# Patient Record
Sex: FEMALE | Race: WHITE | NOT HISPANIC OR LATINO | URBAN - METROPOLITAN AREA
[De-identification: names, ages, dates, MRNs, and addresses within clinical notes are randomized per-mention and may not be internally consistent; named-entity substitution may affect disease eponyms.]

---

## 2017-10-31 ENCOUNTER — EMERGENCY (EMERGENCY)
Facility: HOSPITAL | Age: 72
LOS: 1 days | Discharge: HOME | End: 2017-10-31
Attending: EMERGENCY MEDICINE | Admitting: EMERGENCY MEDICINE
Payer: MEDICARE

## 2017-10-31 VITALS
TEMPERATURE: 98 F | SYSTOLIC BLOOD PRESSURE: 122 MMHG | DIASTOLIC BLOOD PRESSURE: 94 MMHG | OXYGEN SATURATION: 99 % | HEART RATE: 61 BPM | RESPIRATION RATE: 18 BRPM

## 2017-10-31 VITALS
OXYGEN SATURATION: 100 % | SYSTOLIC BLOOD PRESSURE: 130 MMHG | DIASTOLIC BLOOD PRESSURE: 46 MMHG | RESPIRATION RATE: 16 BRPM | TEMPERATURE: 98 F | HEART RATE: 57 BPM

## 2017-10-31 DIAGNOSIS — Z98.89 OTHER SPECIFIED POSTPROCEDURAL STATES: Chronic | ICD-10-CM

## 2017-10-31 LAB
ALBUMIN SERPL ELPH-MCNC: 3.9 G/DL — SIGNIFICANT CHANGE UP (ref 3.3–5)
ALP SERPL-CCNC: 97 U/L — SIGNIFICANT CHANGE UP (ref 40–120)
ALT FLD-CCNC: 84 U/L — HIGH (ref 4–33)
AST SERPL-CCNC: 54 U/L — HIGH (ref 4–32)
BASOPHILS # BLD AUTO: 0.03 K/UL — SIGNIFICANT CHANGE UP (ref 0–0.2)
BASOPHILS NFR BLD AUTO: 0.3 % — SIGNIFICANT CHANGE UP (ref 0–2)
BILIRUB SERPL-MCNC: 0.4 MG/DL — SIGNIFICANT CHANGE UP (ref 0.2–1.2)
BUN SERPL-MCNC: 24 MG/DL — HIGH (ref 7–23)
CALCIUM SERPL-MCNC: 8.4 MG/DL — SIGNIFICANT CHANGE UP (ref 8.4–10.5)
CHLORIDE SERPL-SCNC: 98 MMOL/L — SIGNIFICANT CHANGE UP (ref 98–107)
CO2 SERPL-SCNC: 19 MMOL/L — LOW (ref 22–31)
CREAT SERPL-MCNC: 0.99 MG/DL — SIGNIFICANT CHANGE UP (ref 0.5–1.3)
CRP SERPL-MCNC: 10.8 MG/L — HIGH
EOSINOPHIL # BLD AUTO: 0.08 K/UL — SIGNIFICANT CHANGE UP (ref 0–0.5)
EOSINOPHIL NFR BLD AUTO: 0.9 % — SIGNIFICANT CHANGE UP (ref 0–6)
GLUCOSE SERPL-MCNC: 77 MG/DL — SIGNIFICANT CHANGE UP (ref 70–99)
HCT VFR BLD CALC: 38.4 % — SIGNIFICANT CHANGE UP (ref 34.5–45)
HGB BLD-MCNC: 13 G/DL — SIGNIFICANT CHANGE UP (ref 11.5–15.5)
IMM GRANULOCYTES # BLD AUTO: 0.04 # — SIGNIFICANT CHANGE UP
IMM GRANULOCYTES NFR BLD AUTO: 0.5 % — SIGNIFICANT CHANGE UP (ref 0–1.5)
LYMPHOCYTES # BLD AUTO: 1.14 K/UL — SIGNIFICANT CHANGE UP (ref 1–3.3)
LYMPHOCYTES # BLD AUTO: 13 % — SIGNIFICANT CHANGE UP (ref 13–44)
MCHC RBC-ENTMCNC: 32.7 PG — SIGNIFICANT CHANGE UP (ref 27–34)
MCHC RBC-ENTMCNC: 33.9 % — SIGNIFICANT CHANGE UP (ref 32–36)
MCV RBC AUTO: 96.7 FL — SIGNIFICANT CHANGE UP (ref 80–100)
MONOCYTES # BLD AUTO: 0.43 K/UL — SIGNIFICANT CHANGE UP (ref 0–0.9)
MONOCYTES NFR BLD AUTO: 4.9 % — SIGNIFICANT CHANGE UP (ref 2–14)
NEUTROPHILS # BLD AUTO: 7.04 K/UL — SIGNIFICANT CHANGE UP (ref 1.8–7.4)
NEUTROPHILS NFR BLD AUTO: 80.4 % — HIGH (ref 43–77)
NRBC # FLD: 0 — SIGNIFICANT CHANGE UP
PLATELET # BLD AUTO: 268 K/UL — SIGNIFICANT CHANGE UP (ref 150–400)
PMV BLD: 9.6 FL — SIGNIFICANT CHANGE UP (ref 7–13)
POTASSIUM SERPL-MCNC: 4 MMOL/L — SIGNIFICANT CHANGE UP (ref 3.5–5.3)
POTASSIUM SERPL-SCNC: 4 MMOL/L — SIGNIFICANT CHANGE UP (ref 3.5–5.3)
PROT SERPL-MCNC: 6.9 G/DL — SIGNIFICANT CHANGE UP (ref 6–8.3)
RBC # BLD: 3.97 M/UL — SIGNIFICANT CHANGE UP (ref 3.8–5.2)
RBC # FLD: 14.6 % — HIGH (ref 10.3–14.5)
SODIUM SERPL-SCNC: 134 MMOL/L — LOW (ref 135–145)
WBC # BLD: 8.76 K/UL — SIGNIFICANT CHANGE UP (ref 3.8–10.5)
WBC # FLD AUTO: 8.76 K/UL — SIGNIFICANT CHANGE UP (ref 3.8–10.5)

## 2017-10-31 PROCEDURE — 99285 EMERGENCY DEPT VISIT HI MDM: CPT | Mod: GC

## 2017-10-31 PROCEDURE — 73502 X-RAY EXAM HIP UNI 2-3 VIEWS: CPT | Mod: 26,RT

## 2017-10-31 RX ORDER — MORPHINE SULFATE 50 MG/1
4 CAPSULE, EXTENDED RELEASE ORAL ONCE
Qty: 0 | Refills: 0 | Status: DISCONTINUED | OUTPATIENT
Start: 2017-10-31 | End: 2017-10-31

## 2017-10-31 RX ADMIN — MORPHINE SULFATE 4 MILLIGRAM(S): 50 CAPSULE, EXTENDED RELEASE ORAL at 12:52

## 2017-10-31 RX ADMIN — MORPHINE SULFATE 4 MILLIGRAM(S): 50 CAPSULE, EXTENDED RELEASE ORAL at 12:08

## 2017-10-31 RX ADMIN — MORPHINE SULFATE 4 MILLIGRAM(S): 50 CAPSULE, EXTENDED RELEASE ORAL at 10:27

## 2017-10-31 NOTE — PHYSICAL THERAPY INITIAL EVALUATION ADULT - PERTINENT HX OF CURRENT PROBLEM, REHAB EVAL
Pt is a 72 year old female with PMHx of CAD s/p stents, HTN, seizure, ETOH abuse, with recent Hip pinning 2/2 fall 1 week ago

## 2017-10-31 NOTE — ED PROVIDER NOTE - PMH
Alcohol abuse    Anxiety    Anxiety    CAD (coronary artery disease)    Coronary artery disease    Emphysema, unspecified    HTN (hypertension)    Hyperlipidemia    Hypertension    Migraine    Migraine    Pain of right hip joint    Seizure    Stented coronary artery

## 2017-10-31 NOTE — ED PROVIDER NOTE - PROGRESS NOTE DETAILS
pt symptomatically improved. pt showing drug seeking behavior. pt with recent post op from hip surgery/. PT eval pt and state clear for rehab. pt states she wants to go home, has PT eval at home. pt daughter states she will f/u with pmd, ortho. has oxy at home. instructed to rest, elevate legs. use stair master

## 2017-10-31 NOTE — PHYSICAL THERAPY INITIAL EVALUATION ADULT - ADDITIONAL COMMENTS
Pt reports that she lives in a private house with  ( has hx of CVA who she takes care of "during the evenings") son and his girlfriend with ~4STE and a flight of stairs to negotiate to bedroom. Pt was recently discharged from hospital s/p right hip pinning from fall ~1 week ago. Pt has been ambulating using rolling walker.    Pt left comfortable on stretcher, NAD, all lines intact, all precautions maintained, and RN aware of PT session. Pt reports that she lives in a private house with  ( has hx of CVA who she takes care of "during the evenings") son and his girlfriend with ~4STE and a flight of stairs to negotiate to bedroom. Pt was recently discharged from hospital s/p right hip pinning from fall ~1 week ago. Pt has been ambulating using rolling walker. Pt was suppose to start Home PT today.     Pt left comfortable on stretcher, NAD, all lines intact, all precautions maintained, and RN aware of PT session.

## 2017-10-31 NOTE — PHYSICAL THERAPY INITIAL EVALUATION ADULT - PATIENT PROFILE REVIEW, REHAB EVAL
No activity order in the computer; spoke with Reed Bobby (DO) prior to PT evaluation--> Pt OK for ambulation/yes

## 2017-10-31 NOTE — PHYSICAL THERAPY INITIAL EVALUATION ADULT - DIAGNOSIS, PT EVAL
Pt admitted for Right Hip pain s/p fall ~1 week ago s/p pinning; pt presents with decreased strength and antalgic gait

## 2017-10-31 NOTE — ED PROVIDER NOTE - OBJECTIVE STATEMENT
73 y/o f with pmhx of copd, htn, recent r hip fracture at [facility]. p/w worsening pain. states able to ambulate at home but states pain has significantly worsened. able to ambulate, no fevers. states nurse visited for wound dressing change, denies fever. denies new falls. has rom.

## 2017-10-31 NOTE — PROVIDER CONTACT NOTE (OTHER) - ASSESSMENT
Pt now refusing inpt rehab placement and would like to return home with out patient rehab or home care.  Daughter Brittni made aware of patient wanting to go home.  Family is to call the P/T agency and have them come back to the home for evaluation.
pt stable to go home.  pt to leave with senior ride ambulette.  Bill back to [facility] as per Noa Blackburn.  trip#[id number] for 4pm.
swk spoke with patient regarding rehab placement.  pt is in agreement for rehab placement.  Pt interested in [facility] for rehab.  APOLINAR and P/T eval received and ecind to [facility].
PT recommended STR placement, Pt is in agreement to go to rehab, APOLINAR completed and covering SW [facility] was informed.

## 2018-05-14 ENCOUNTER — INPATIENT (INPATIENT)
Facility: HOSPITAL | Age: 73
LOS: 10 days | Discharge: NURSING, REHAB OR LONG-TERM CARE | End: 2018-05-25
Attending: ORTHOPAEDIC SURGERY | Admitting: ORTHOPAEDIC SURGERY
Payer: MEDICARE

## 2018-05-14 VITALS
OXYGEN SATURATION: 99 % | DIASTOLIC BLOOD PRESSURE: 73 MMHG | RESPIRATION RATE: 18 BRPM | SYSTOLIC BLOOD PRESSURE: 172 MMHG | TEMPERATURE: 98 F | HEART RATE: 76 BPM

## 2018-05-14 DIAGNOSIS — Z98.89 OTHER SPECIFIED POSTPROCEDURAL STATES: Chronic | ICD-10-CM

## 2018-05-14 DIAGNOSIS — M87.051 IDIOPATHIC ASEPTIC NECROSIS OF RIGHT FEMUR: ICD-10-CM

## 2018-05-14 LAB
ALBUMIN SERPL ELPH-MCNC: 4 G/DL — SIGNIFICANT CHANGE UP (ref 3.3–5)
ALP SERPL-CCNC: 76 U/L — SIGNIFICANT CHANGE UP (ref 40–120)
ALT FLD-CCNC: 12 U/L — SIGNIFICANT CHANGE UP (ref 4–33)
AST SERPL-CCNC: 18 U/L — SIGNIFICANT CHANGE UP (ref 4–32)
BASOPHILS # BLD AUTO: 0.04 K/UL — SIGNIFICANT CHANGE UP (ref 0–0.2)
BASOPHILS NFR BLD AUTO: 0.5 % — SIGNIFICANT CHANGE UP (ref 0–2)
BILIRUB SERPL-MCNC: < 0.2 MG/DL — LOW (ref 0.2–1.2)
BUN SERPL-MCNC: 29 MG/DL — HIGH (ref 7–23)
CALCIUM SERPL-MCNC: 8.9 MG/DL — SIGNIFICANT CHANGE UP (ref 8.4–10.5)
CHLORIDE SERPL-SCNC: 100 MMOL/L — SIGNIFICANT CHANGE UP (ref 98–107)
CO2 SERPL-SCNC: 27 MMOL/L — SIGNIFICANT CHANGE UP (ref 22–31)
CREAT SERPL-MCNC: 1.45 MG/DL — HIGH (ref 0.5–1.3)
CRP SERPL-MCNC: 12.3 MG/L — HIGH
EOSINOPHIL # BLD AUTO: 0.16 K/UL — SIGNIFICANT CHANGE UP (ref 0–0.5)
EOSINOPHIL NFR BLD AUTO: 2.1 % — SIGNIFICANT CHANGE UP (ref 0–6)
ERYTHROCYTE [SEDIMENTATION RATE] IN BLOOD: 26 MM/HR — HIGH (ref 4–25)
GLUCOSE SERPL-MCNC: 88 MG/DL — SIGNIFICANT CHANGE UP (ref 70–99)
HCT VFR BLD CALC: 40.1 % — SIGNIFICANT CHANGE UP (ref 34.5–45)
HGB BLD-MCNC: 12.9 G/DL — SIGNIFICANT CHANGE UP (ref 11.5–15.5)
IMM GRANULOCYTES # BLD AUTO: 0.03 # — SIGNIFICANT CHANGE UP
IMM GRANULOCYTES NFR BLD AUTO: 0.4 % — SIGNIFICANT CHANGE UP (ref 0–1.5)
LYMPHOCYTES # BLD AUTO: 1.36 K/UL — SIGNIFICANT CHANGE UP (ref 1–3.3)
LYMPHOCYTES # BLD AUTO: 17.7 % — SIGNIFICANT CHANGE UP (ref 13–44)
MCHC RBC-ENTMCNC: 31.8 PG — SIGNIFICANT CHANGE UP (ref 27–34)
MCHC RBC-ENTMCNC: 32.2 % — SIGNIFICANT CHANGE UP (ref 32–36)
MCV RBC AUTO: 98.8 FL — SIGNIFICANT CHANGE UP (ref 80–100)
MONOCYTES # BLD AUTO: 0.73 K/UL — SIGNIFICANT CHANGE UP (ref 0–0.9)
MONOCYTES NFR BLD AUTO: 9.5 % — SIGNIFICANT CHANGE UP (ref 2–14)
NEUTROPHILS # BLD AUTO: 5.38 K/UL — SIGNIFICANT CHANGE UP (ref 1.8–7.4)
NEUTROPHILS NFR BLD AUTO: 69.8 % — SIGNIFICANT CHANGE UP (ref 43–77)
NRBC # FLD: 0 — SIGNIFICANT CHANGE UP
PLATELET # BLD AUTO: 322 K/UL — SIGNIFICANT CHANGE UP (ref 150–400)
PMV BLD: 8.6 FL — SIGNIFICANT CHANGE UP (ref 7–13)
POTASSIUM SERPL-MCNC: 3.8 MMOL/L — SIGNIFICANT CHANGE UP (ref 3.5–5.3)
POTASSIUM SERPL-SCNC: 3.8 MMOL/L — SIGNIFICANT CHANGE UP (ref 3.5–5.3)
PROT SERPL-MCNC: 7.3 G/DL — SIGNIFICANT CHANGE UP (ref 6–8.3)
RBC # BLD: 4.06 M/UL — SIGNIFICANT CHANGE UP (ref 3.8–5.2)
RBC # FLD: 15 % — HIGH (ref 10.3–14.5)
SODIUM SERPL-SCNC: 140 MMOL/L — SIGNIFICANT CHANGE UP (ref 135–145)
WBC # BLD: 7.7 K/UL — SIGNIFICANT CHANGE UP (ref 3.8–10.5)
WBC # FLD AUTO: 7.7 K/UL — SIGNIFICANT CHANGE UP (ref 3.8–10.5)

## 2018-05-14 PROCEDURE — 71045 X-RAY EXAM CHEST 1 VIEW: CPT | Mod: 26

## 2018-05-14 PROCEDURE — 73502 X-RAY EXAM HIP UNI 2-3 VIEWS: CPT | Mod: 26,RT

## 2018-05-14 PROCEDURE — 73700 CT LOWER EXTREMITY W/O DYE: CPT | Mod: 26,RT

## 2018-05-14 PROCEDURE — 76377 3D RENDER W/INTRP POSTPROCES: CPT | Mod: 26

## 2018-05-14 RX ORDER — TRAMADOL HYDROCHLORIDE 50 MG/1
25 TABLET ORAL EVERY 6 HOURS
Qty: 0 | Refills: 0 | Status: DISCONTINUED | OUTPATIENT
Start: 2018-05-14 | End: 2018-05-14

## 2018-05-14 RX ORDER — HYDROMORPHONE HYDROCHLORIDE 2 MG/ML
2 INJECTION INTRAMUSCULAR; INTRAVENOUS; SUBCUTANEOUS EVERY 4 HOURS
Qty: 0 | Refills: 0 | Status: DISCONTINUED | OUTPATIENT
Start: 2018-05-14 | End: 2018-05-15

## 2018-05-14 RX ORDER — OXYCODONE HYDROCHLORIDE 5 MG/1
5 TABLET ORAL EVERY 4 HOURS
Qty: 0 | Refills: 0 | Status: DISCONTINUED | OUTPATIENT
Start: 2018-05-14 | End: 2018-05-14

## 2018-05-14 RX ORDER — TRAZODONE HCL 50 MG
100 TABLET ORAL AT BEDTIME
Qty: 0 | Refills: 0 | Status: DISCONTINUED | OUTPATIENT
Start: 2018-05-14 | End: 2018-05-15

## 2018-05-14 RX ORDER — OXYCODONE HYDROCHLORIDE 5 MG/1
10 TABLET ORAL EVERY 4 HOURS
Qty: 0 | Refills: 0 | Status: DISCONTINUED | OUTPATIENT
Start: 2018-05-14 | End: 2018-05-14

## 2018-05-14 RX ORDER — HYDROMORPHONE HYDROCHLORIDE 2 MG/ML
4 INJECTION INTRAMUSCULAR; INTRAVENOUS; SUBCUTANEOUS EVERY 4 HOURS
Qty: 0 | Refills: 0 | Status: DISCONTINUED | OUTPATIENT
Start: 2018-05-14 | End: 2018-05-15

## 2018-05-14 RX ORDER — METOPROLOL TARTRATE 50 MG
25 TABLET ORAL
Qty: 0 | Refills: 0 | Status: DISCONTINUED | OUTPATIENT
Start: 2018-05-14 | End: 2018-05-15

## 2018-05-14 RX ORDER — MORPHINE SULFATE 50 MG/1
4 CAPSULE, EXTENDED RELEASE ORAL ONCE
Qty: 0 | Refills: 0 | Status: DISCONTINUED | OUTPATIENT
Start: 2018-05-14 | End: 2018-05-14

## 2018-05-14 RX ORDER — SIMVASTATIN 20 MG/1
20 TABLET, FILM COATED ORAL AT BEDTIME
Qty: 0 | Refills: 0 | Status: DISCONTINUED | OUTPATIENT
Start: 2018-05-14 | End: 2018-05-15

## 2018-05-14 RX ORDER — LISINOPRIL 2.5 MG/1
20 TABLET ORAL DAILY
Qty: 0 | Refills: 0 | Status: DISCONTINUED | OUTPATIENT
Start: 2018-05-14 | End: 2018-05-15

## 2018-05-14 RX ORDER — HEPARIN SODIUM 5000 [USP'U]/ML
5000 INJECTION INTRAVENOUS; SUBCUTANEOUS EVERY 8 HOURS
Qty: 0 | Refills: 0 | Status: DISCONTINUED | OUTPATIENT
Start: 2018-05-14 | End: 2018-05-15

## 2018-05-14 RX ORDER — MORPHINE SULFATE 50 MG/1
2 CAPSULE, EXTENDED RELEASE ORAL EVERY 4 HOURS
Qty: 0 | Refills: 0 | Status: DISCONTINUED | OUTPATIENT
Start: 2018-05-14 | End: 2018-05-16

## 2018-05-14 RX ORDER — DOCUSATE SODIUM 100 MG
100 CAPSULE ORAL THREE TIMES A DAY
Qty: 0 | Refills: 0 | Status: DISCONTINUED | OUTPATIENT
Start: 2018-05-14 | End: 2018-05-22

## 2018-05-14 RX ORDER — ACETAMINOPHEN 500 MG
650 TABLET ORAL EVERY 6 HOURS
Qty: 0 | Refills: 0 | Status: DISCONTINUED | OUTPATIENT
Start: 2018-05-14 | End: 2018-05-25

## 2018-05-14 RX ORDER — SENNA PLUS 8.6 MG/1
2 TABLET ORAL AT BEDTIME
Qty: 0 | Refills: 0 | Status: DISCONTINUED | OUTPATIENT
Start: 2018-05-14 | End: 2018-05-21

## 2018-05-14 RX ADMIN — HEPARIN SODIUM 5000 UNIT(S): 5000 INJECTION INTRAVENOUS; SUBCUTANEOUS at 21:56

## 2018-05-14 RX ADMIN — MORPHINE SULFATE 2 MILLIGRAM(S): 50 CAPSULE, EXTENDED RELEASE ORAL at 21:20

## 2018-05-14 RX ADMIN — MORPHINE SULFATE 4 MILLIGRAM(S): 50 CAPSULE, EXTENDED RELEASE ORAL at 12:06

## 2018-05-14 RX ADMIN — MORPHINE SULFATE 4 MILLIGRAM(S): 50 CAPSULE, EXTENDED RELEASE ORAL at 15:48

## 2018-05-14 RX ADMIN — MORPHINE SULFATE 4 MILLIGRAM(S): 50 CAPSULE, EXTENDED RELEASE ORAL at 15:49

## 2018-05-14 RX ADMIN — MORPHINE SULFATE 4 MILLIGRAM(S): 50 CAPSULE, EXTENDED RELEASE ORAL at 16:05

## 2018-05-14 RX ADMIN — MORPHINE SULFATE 2 MILLIGRAM(S): 50 CAPSULE, EXTENDED RELEASE ORAL at 21:01

## 2018-05-14 RX ADMIN — Medication 25 MILLIGRAM(S): at 21:01

## 2018-05-14 RX ADMIN — LISINOPRIL 20 MILLIGRAM(S): 2.5 TABLET ORAL at 21:01

## 2018-05-14 RX ADMIN — SIMVASTATIN 20 MILLIGRAM(S): 20 TABLET, FILM COATED ORAL at 21:01

## 2018-05-14 RX ADMIN — HYDROMORPHONE HYDROCHLORIDE 4 MILLIGRAM(S): 2 INJECTION INTRAMUSCULAR; INTRAVENOUS; SUBCUTANEOUS at 23:28

## 2018-05-14 NOTE — ED ADULT TRIAGE NOTE - CHIEF COMPLAINT QUOTE
Pt complaining of R hip pain, states she had a hip replacement "few months ago" was seen at [facility] last week for the same pain and was discharged. Pt states her daughter told her to come to [facility] for a second opinion.

## 2018-05-14 NOTE — ED ADULT NURSE NOTE - OBJECTIVE STATEMENT
Pt received to intake rm #4. A+Ox3.  skin warm and dry.  resp even, non-labored.  c/o pain to rt hip w hard mass since after surgery a few months ago.  pt is very talkative.  in nad.  seen and eval'd by md.  SL placed. labs sent.  pt medicated for pain as ordered. awaits imaging.  in nad.

## 2018-05-14 NOTE — ED PROVIDER NOTE - PROGRESS NOTE DETAILS
Rec'd signout on this pt from Dr. Leo: 73yo F s/p R hip fx w/ surgical repair 6 months ago, c/o severe R hip pain, has declined PT and acute rehab in the past due to social issues (takes care of ), CT hip showing Malunion of the right transcervical femoral neck fracture with   lucency surrounding the distal screw tip, and suggestive of avascular necrosis.  Dispo pending ortho eval. At very least, will likely need admission for pain mngmt.   -Dr. Vallejo Ahmet: At pt's request I called her daughter, Brittni, who is a PA at [employer], with update.  Reviewed CT findings and possible need for total hip replacement. Also that we are waiting for final recc from ortho.  She expressed understanding and requested updated with final plan.  Dr. Monae to call pt's daughter this evening. Brittni Rula [phone]

## 2018-05-14 NOTE — ED PROVIDER NOTE - OBJECTIVE STATEMENT
72 yr old F c h/o right hip fx and surgery < 6 mo ago, extensive PMHx, who p/w severe right hip pain and trouble ambulating.  States this has been an ongoing issue.  Was seen at this ER previously for same. Also, recently, seen at [facility] for this, and discharged . States there is a bulge from her right hip which is new.  eAlso appears very uncomfortable. Taking medications for pain without relief. States last time she was in this ED PT recc inpt rehab but she declined since she had to care for her  at home (confirmed c PT note).  States now she is amenable since she has an aid for her . No new falls or injuries. 72 yr old F c h/o right hip fx and surgery < 6 mo ago, extensive PMHx, who p/w severe right hip pain and trouble ambulating.  States this has been an ongoing issue.  Was seen at this ER previously for same. Also, recently, seen at [facility] for this, and discharged . States there is a bulge from her right hip which is new.  eAlso appears very uncomfortable. Taking medications for pain without relief. States last time she was in this ED PT recc inpt rehab but she declined since she had to care for her  at home (confirmed c PT note).  States now she is amenable since she has an aid for her . No new falls or injuries.    Daughter: Brittni Horta [phone]

## 2018-05-14 NOTE — H&P ADULT - HISTORY OF PRESENT ILLNESS
HPI: 72y Female w/ hx of R femoral neck fx s/p CRPP at [facility] 10/17, CAD s/p multiple stents (remote), HTN, HLD, EtOH abuse, and anxiety, who is a community ambulator w/o assistive devices presents c/o months of progressively worsening R hip pain that has now become so bad that she is unable to ambulate. Says that the pain is mostly in her right groin and occasionally radiates down her anterior leg. Denies numbness/tingling. Denies fever/chills. Denies pain/injury elsewhere. Patient says that she is an active smoker. She also says that following her CRPP procedure, she had some sort of infection where her legs get red and swollen and she required antibiotics, possible cellulitis.     Of note, patient says that her cardiologist is Dr. Tera Romero, a doctor affiliated with [place].    PAST MEDICAL & SURGICAL HISTORY:  Pain of right hip joint  Migraine  Alcohol abuse  Seizure  Stented coronary artery  Coronary artery disease  Anxiety  HTN (hypertension)  Emphysema, unspecified  Anxiety  Migraine  CAD (coronary artery disease)  Hyperlipidemia  Hypertension  No significant past surgical history  S/P bladder repair    Social hx:  Active smoker  Former drinker: hx of EtOH abuse    MEDICATIONS  (STANDING):  docusate sodium 100 milliGRAM(s) Oral three times a day  heparin  Injectable 5000 Unit(s) SubCutaneous every 8 hours  lisinopril 20 milliGRAM(s) Oral daily  metoprolol tartrate 25 milliGRAM(s) Oral two times a day  senna 2 Tablet(s) Oral at bedtime  simvastatin 20 milliGRAM(s) Oral at bedtime    Allergies  No Known Allergies                        12.9   7.70  )-----------( 322      ( 14 May 2018 12:09 )             40.1     14 May 2018 12:09    140    |  100    |  29     ----------------------------<  88     3.8     |  27     |  1.45     Ca    8.9        14 May 2018 12:09    TPro  7.3    /  Alb  4.0    /  TBili  < 0.2  /  DBili  x      /  AST  18     /  ALT  12     /  AlkPhos  76     14 May 2018 12:09    Vital Signs Last 24 Hrs  T(C): 37.2 (05-14-18 @ 18:30), Max: 37.3 (05-14-18 @ 15:55)  T(F): 99 (05-14-18 @ 18:30), Max: 99.2 (05-14-18 @ 15:55)  HR: 65 (05-14-18 @ 18:30) (65 - 76)  BP: 108/54 (05-14-18 @ 18:30) (108/54 - 172/73)  RR: 18 (05-14-18 @ 18:30) (18 - 18)  SpO2: 98% (05-14-18 @ 18:30) (98% - 99%)    Imaging: XR were personally reviewed and demonstrates collapse of the femoral head/neck s/p CRPP, with backing out of the screws    CT hip:  IMPRESSION:  1.  Status post ORIF of the right hip with transcervical screws.  2.  Malunion of the right transcervical femoral neck fracture with   lucency surrounding the distal screw tips. Screws closely approximate and   contact the articular surface of the femoral head.  3.  Geographic area of hypointense signal within the femoral head   suggesting avascular necrosis  4.  Fluid and calcifications overlying the right lateral screws adjacent   to the greater trochanter suggestive of a bursitis.    Physical Exam  Gen: NAD  Gait: antalgic gait, patient only able to take a few unstable steps before stoping due to pain  RLE: skin intact, but tented, presumably by CRPP screws, no areas of skin breakdown  5/5 Quads/TA/EHL/GS  SILT in all distributions  WWP distally    A/P: 72y Female 7 months s/p R femoral neck fracture CRPP with avascular necrosis of the femoral head and collapse causing severe arthrosis. Patient being admitted to orthopaedic service for further workup and consideration of total hip arthroplasty.    Pain control  Needs workup to r/o infectious causes of hip pain: FU ESR, CRP  FU pre-op labs and studies  Medical clearance/optimization for OR  Cardiology consult  DVT ppx  Admit to Ortho    Patient discussed with Dr. Addison Lakhani MD

## 2018-05-14 NOTE — ED ADULT NURSE NOTE - CHIEF COMPLAINT QUOTE
Pt complaining of R hip pain, states she had a hip replacement "few months ago" was seen at [facility] last week for the same pain and was discharged. Pt states her daughter told her to come to [facility] for a second opinion.

## 2018-05-14 NOTE — ED PROVIDER NOTE - MEDICAL DECISION MAKING DETAILS
Right hip pain, had declined PT in past, appears uncomfortable.  no new injuries but will re-image, pain control, possible admit for PT.

## 2018-05-15 DIAGNOSIS — I10 ESSENTIAL (PRIMARY) HYPERTENSION: ICD-10-CM

## 2018-05-15 DIAGNOSIS — E78.5 HYPERLIPIDEMIA, UNSPECIFIED: ICD-10-CM

## 2018-05-15 DIAGNOSIS — F10.10 ALCOHOL ABUSE, UNCOMPLICATED: ICD-10-CM

## 2018-05-15 DIAGNOSIS — F41.9 ANXIETY DISORDER, UNSPECIFIED: ICD-10-CM

## 2018-05-15 DIAGNOSIS — F17.200 NICOTINE DEPENDENCE, UNSPECIFIED, UNCOMPLICATED: ICD-10-CM

## 2018-05-15 DIAGNOSIS — M87.051 IDIOPATHIC ASEPTIC NECROSIS OF RIGHT FEMUR: ICD-10-CM

## 2018-05-15 DIAGNOSIS — Z29.9 ENCOUNTER FOR PROPHYLACTIC MEASURES, UNSPECIFIED: ICD-10-CM

## 2018-05-15 DIAGNOSIS — J44.9 CHRONIC OBSTRUCTIVE PULMONARY DISEASE, UNSPECIFIED: ICD-10-CM

## 2018-05-15 DIAGNOSIS — I25.10 ATHEROSCLEROTIC HEART DISEASE OF NATIVE CORONARY ARTERY WITHOUT ANGINA PECTORIS: ICD-10-CM

## 2018-05-15 LAB
APTT BLD: 30.3 SEC — SIGNIFICANT CHANGE UP (ref 27.5–37.4)
BLD GP AB SCN SERPL QL: NEGATIVE — SIGNIFICANT CHANGE UP
BUN SERPL-MCNC: 23 MG/DL — SIGNIFICANT CHANGE UP (ref 7–23)
CALCIUM SERPL-MCNC: 8.8 MG/DL — SIGNIFICANT CHANGE UP (ref 8.4–10.5)
CHLORIDE SERPL-SCNC: 101 MMOL/L — SIGNIFICANT CHANGE UP (ref 98–107)
CO2 SERPL-SCNC: 28 MMOL/L — SIGNIFICANT CHANGE UP (ref 22–31)
CREAT SERPL-MCNC: 0.76 MG/DL — SIGNIFICANT CHANGE UP (ref 0.5–1.3)
GLUCOSE SERPL-MCNC: 91 MG/DL — SIGNIFICANT CHANGE UP (ref 70–99)
HCT VFR BLD CALC: 38 % — SIGNIFICANT CHANGE UP (ref 34.5–45)
HGB BLD-MCNC: 12.3 G/DL — SIGNIFICANT CHANGE UP (ref 11.5–15.5)
INR BLD: 0.99 — SIGNIFICANT CHANGE UP (ref 0.88–1.17)
MCHC RBC-ENTMCNC: 32.4 % — SIGNIFICANT CHANGE UP (ref 32–36)
MCHC RBC-ENTMCNC: 32.6 PG — SIGNIFICANT CHANGE UP (ref 27–34)
MCV RBC AUTO: 100.8 FL — HIGH (ref 80–100)
NRBC # FLD: 0 — SIGNIFICANT CHANGE UP
PLATELET # BLD AUTO: 259 K/UL — SIGNIFICANT CHANGE UP (ref 150–400)
PMV BLD: 8.8 FL — SIGNIFICANT CHANGE UP (ref 7–13)
POTASSIUM SERPL-MCNC: 4.1 MMOL/L — SIGNIFICANT CHANGE UP (ref 3.5–5.3)
POTASSIUM SERPL-SCNC: 4.1 MMOL/L — SIGNIFICANT CHANGE UP (ref 3.5–5.3)
PROTHROM AB SERPL-ACNC: 11 SEC — SIGNIFICANT CHANGE UP (ref 9.8–13.1)
RBC # BLD: 3.77 M/UL — LOW (ref 3.8–5.2)
RBC # FLD: 15 % — HIGH (ref 10.3–14.5)
RH IG SCN BLD-IMP: POSITIVE — SIGNIFICANT CHANGE UP
SODIUM SERPL-SCNC: 140 MMOL/L — SIGNIFICANT CHANGE UP (ref 135–145)
WBC # BLD: 5.91 K/UL — SIGNIFICANT CHANGE UP (ref 3.8–10.5)
WBC # FLD AUTO: 5.91 K/UL — SIGNIFICANT CHANGE UP (ref 3.8–10.5)

## 2018-05-15 PROCEDURE — 99223 1ST HOSP IP/OBS HIGH 75: CPT

## 2018-05-15 RX ORDER — NICOTINE POLACRILEX 2 MG
1 GUM BUCCAL DAILY
Qty: 0 | Refills: 0 | Status: DISCONTINUED | OUTPATIENT
Start: 2018-05-15 | End: 2018-05-25

## 2018-05-15 RX ORDER — TIOTROPIUM BROMIDE 18 UG/1
1 CAPSULE ORAL; RESPIRATORY (INHALATION) DAILY
Qty: 0 | Refills: 0 | Status: DISCONTINUED | OUTPATIENT
Start: 2018-05-15 | End: 2018-05-25

## 2018-05-15 RX ORDER — HYDROMORPHONE HYDROCHLORIDE 2 MG/ML
2 INJECTION INTRAMUSCULAR; INTRAVENOUS; SUBCUTANEOUS
Qty: 0 | Refills: 0 | Status: DISCONTINUED | OUTPATIENT
Start: 2018-05-15 | End: 2018-05-16

## 2018-05-15 RX ORDER — THIAMINE MONONITRATE (VIT B1) 100 MG
100 TABLET ORAL DAILY
Qty: 0 | Refills: 0 | Status: DISCONTINUED | OUTPATIENT
Start: 2018-05-15 | End: 2018-05-16

## 2018-05-15 RX ORDER — HEPARIN SODIUM 5000 [USP'U]/ML
5000 INJECTION INTRAVENOUS; SUBCUTANEOUS EVERY 8 HOURS
Qty: 0 | Refills: 0 | Status: COMPLETED | OUTPATIENT
Start: 2018-05-15 | End: 2018-05-17

## 2018-05-15 RX ORDER — HYDROMORPHONE HYDROCHLORIDE 2 MG/ML
4 INJECTION INTRAMUSCULAR; INTRAVENOUS; SUBCUTANEOUS
Qty: 0 | Refills: 0 | Status: DISCONTINUED | OUTPATIENT
Start: 2018-05-15 | End: 2018-05-16

## 2018-05-15 RX ORDER — MORPHINE SULFATE 50 MG/1
2 CAPSULE, EXTENDED RELEASE ORAL ONCE
Qty: 0 | Refills: 0 | Status: DISCONTINUED | OUTPATIENT
Start: 2018-05-15 | End: 2018-05-15

## 2018-05-15 RX ORDER — NICOTINE POLACRILEX 2 MG
1 GUM BUCCAL DAILY
Qty: 0 | Refills: 0 | Status: DISCONTINUED | OUTPATIENT
Start: 2018-05-15 | End: 2018-05-15

## 2018-05-15 RX ORDER — FOLIC ACID 0.8 MG
1 TABLET ORAL DAILY
Qty: 0 | Refills: 0 | Status: DISCONTINUED | OUTPATIENT
Start: 2018-05-15 | End: 2018-05-16

## 2018-05-15 RX ORDER — NIFEDIPINE 30 MG
90 TABLET, EXTENDED RELEASE 24 HR ORAL DAILY
Qty: 0 | Refills: 0 | Status: DISCONTINUED | OUTPATIENT
Start: 2018-05-15 | End: 2018-05-25

## 2018-05-15 RX ORDER — HEPARIN SODIUM 5000 [USP'U]/ML
5000 INJECTION INTRAVENOUS; SUBCUTANEOUS EVERY 8 HOURS
Qty: 0 | Refills: 0 | Status: DISCONTINUED | OUTPATIENT
Start: 2018-05-15 | End: 2018-05-15

## 2018-05-15 RX ORDER — ASPIRIN/CALCIUM CARB/MAGNESIUM 324 MG
81 TABLET ORAL DAILY
Qty: 0 | Refills: 0 | Status: COMPLETED | OUTPATIENT
Start: 2018-05-15 | End: 2018-05-20

## 2018-05-15 RX ORDER — ALBUTEROL 90 UG/1
2 AEROSOL, METERED ORAL EVERY 6 HOURS
Qty: 0 | Refills: 0 | Status: DISCONTINUED | OUTPATIENT
Start: 2018-05-15 | End: 2018-05-25

## 2018-05-15 RX ORDER — HYDRALAZINE HCL 50 MG
25 TABLET ORAL EVERY 8 HOURS
Qty: 0 | Refills: 0 | Status: DISCONTINUED | OUTPATIENT
Start: 2018-05-15 | End: 2018-05-17

## 2018-05-15 RX ORDER — THIAMINE MONONITRATE (VIT B1) 100 MG
100 TABLET ORAL DAILY
Qty: 0 | Refills: 0 | Status: DISCONTINUED | OUTPATIENT
Start: 2018-05-15 | End: 2018-05-15

## 2018-05-15 RX ORDER — CARVEDILOL PHOSPHATE 80 MG/1
12.5 CAPSULE, EXTENDED RELEASE ORAL EVERY 12 HOURS
Qty: 0 | Refills: 0 | Status: DISCONTINUED | OUTPATIENT
Start: 2018-05-15 | End: 2018-05-19

## 2018-05-15 RX ORDER — SODIUM CHLORIDE 9 MG/ML
1000 INJECTION, SOLUTION INTRAVENOUS
Qty: 0 | Refills: 0 | Status: DISCONTINUED | OUTPATIENT
Start: 2018-05-15 | End: 2018-05-15

## 2018-05-15 RX ORDER — FOLIC ACID 0.8 MG
1 TABLET ORAL DAILY
Qty: 0 | Refills: 0 | Status: DISCONTINUED | OUTPATIENT
Start: 2018-05-15 | End: 2018-05-15

## 2018-05-15 RX ADMIN — HYDROMORPHONE HYDROCHLORIDE 4 MILLIGRAM(S): 2 INJECTION INTRAMUSCULAR; INTRAVENOUS; SUBCUTANEOUS at 19:40

## 2018-05-15 RX ADMIN — MORPHINE SULFATE 2 MILLIGRAM(S): 50 CAPSULE, EXTENDED RELEASE ORAL at 22:23

## 2018-05-15 RX ADMIN — Medication 0.5 MILLIGRAM(S): at 17:54

## 2018-05-15 RX ADMIN — HYDROMORPHONE HYDROCHLORIDE 4 MILLIGRAM(S): 2 INJECTION INTRAMUSCULAR; INTRAVENOUS; SUBCUTANEOUS at 05:03

## 2018-05-15 RX ADMIN — Medication 81 MILLIGRAM(S): at 18:15

## 2018-05-15 RX ADMIN — MORPHINE SULFATE 2 MILLIGRAM(S): 50 CAPSULE, EXTENDED RELEASE ORAL at 14:15

## 2018-05-15 RX ADMIN — HYDROMORPHONE HYDROCHLORIDE 4 MILLIGRAM(S): 2 INJECTION INTRAMUSCULAR; INTRAVENOUS; SUBCUTANEOUS at 05:30

## 2018-05-15 RX ADMIN — Medication 25 MILLIGRAM(S): at 23:09

## 2018-05-15 RX ADMIN — HYDROMORPHONE HYDROCHLORIDE 4 MILLIGRAM(S): 2 INJECTION INTRAMUSCULAR; INTRAVENOUS; SUBCUTANEOUS at 12:14

## 2018-05-15 RX ADMIN — Medication 100 MILLIGRAM(S): at 18:15

## 2018-05-15 RX ADMIN — MORPHINE SULFATE 2 MILLIGRAM(S): 50 CAPSULE, EXTENDED RELEASE ORAL at 22:40

## 2018-05-15 RX ADMIN — Medication 100 MILLIGRAM(S): at 01:09

## 2018-05-15 RX ADMIN — Medication 1 PATCH: at 13:19

## 2018-05-15 RX ADMIN — Medication 1 TABLET(S): at 18:58

## 2018-05-15 RX ADMIN — MORPHINE SULFATE 2 MILLIGRAM(S): 50 CAPSULE, EXTENDED RELEASE ORAL at 05:59

## 2018-05-15 RX ADMIN — MORPHINE SULFATE 2 MILLIGRAM(S): 50 CAPSULE, EXTENDED RELEASE ORAL at 14:02

## 2018-05-15 RX ADMIN — HYDROMORPHONE HYDROCHLORIDE 4 MILLIGRAM(S): 2 INJECTION INTRAMUSCULAR; INTRAVENOUS; SUBCUTANEOUS at 09:50

## 2018-05-15 RX ADMIN — MORPHINE SULFATE 2 MILLIGRAM(S): 50 CAPSULE, EXTENDED RELEASE ORAL at 01:03

## 2018-05-15 RX ADMIN — HYDROMORPHONE HYDROCHLORIDE 4 MILLIGRAM(S): 2 INJECTION INTRAMUSCULAR; INTRAVENOUS; SUBCUTANEOUS at 09:06

## 2018-05-15 RX ADMIN — MORPHINE SULFATE 2 MILLIGRAM(S): 50 CAPSULE, EXTENDED RELEASE ORAL at 10:15

## 2018-05-15 RX ADMIN — HYDROMORPHONE HYDROCHLORIDE 4 MILLIGRAM(S): 2 INJECTION INTRAMUSCULAR; INTRAVENOUS; SUBCUTANEOUS at 13:00

## 2018-05-15 RX ADMIN — CARVEDILOL PHOSPHATE 12.5 MILLIGRAM(S): 80 CAPSULE, EXTENDED RELEASE ORAL at 18:15

## 2018-05-15 RX ADMIN — MORPHINE SULFATE 2 MILLIGRAM(S): 50 CAPSULE, EXTENDED RELEASE ORAL at 18:05

## 2018-05-15 RX ADMIN — MORPHINE SULFATE 2 MILLIGRAM(S): 50 CAPSULE, EXTENDED RELEASE ORAL at 06:15

## 2018-05-15 RX ADMIN — MORPHINE SULFATE 2 MILLIGRAM(S): 50 CAPSULE, EXTENDED RELEASE ORAL at 20:06

## 2018-05-15 RX ADMIN — MORPHINE SULFATE 2 MILLIGRAM(S): 50 CAPSULE, EXTENDED RELEASE ORAL at 01:20

## 2018-05-15 RX ADMIN — HYDROMORPHONE HYDROCHLORIDE 4 MILLIGRAM(S): 2 INJECTION INTRAMUSCULAR; INTRAVENOUS; SUBCUTANEOUS at 23:10

## 2018-05-15 RX ADMIN — MORPHINE SULFATE 2 MILLIGRAM(S): 50 CAPSULE, EXTENDED RELEASE ORAL at 10:03

## 2018-05-15 RX ADMIN — HYDROMORPHONE HYDROCHLORIDE 4 MILLIGRAM(S): 2 INJECTION INTRAMUSCULAR; INTRAVENOUS; SUBCUTANEOUS at 18:58

## 2018-05-15 RX ADMIN — HYDROMORPHONE HYDROCHLORIDE 4 MILLIGRAM(S): 2 INJECTION INTRAMUSCULAR; INTRAVENOUS; SUBCUTANEOUS at 16:00

## 2018-05-15 RX ADMIN — MORPHINE SULFATE 2 MILLIGRAM(S): 50 CAPSULE, EXTENDED RELEASE ORAL at 18:20

## 2018-05-15 RX ADMIN — MORPHINE SULFATE 2 MILLIGRAM(S): 50 CAPSULE, EXTENDED RELEASE ORAL at 20:50

## 2018-05-15 RX ADMIN — Medication 1 TABLET(S): at 18:15

## 2018-05-15 RX ADMIN — HYDROMORPHONE HYDROCHLORIDE 4 MILLIGRAM(S): 2 INJECTION INTRAMUSCULAR; INTRAVENOUS; SUBCUTANEOUS at 15:43

## 2018-05-15 RX ADMIN — LISINOPRIL 20 MILLIGRAM(S): 2.5 TABLET ORAL at 05:03

## 2018-05-15 RX ADMIN — Medication 1 MILLIGRAM(S): at 18:15

## 2018-05-15 NOTE — CONSULT NOTE ADULT - SUBJECTIVE AND OBJECTIVE BOX
CHIEF COMPLAINT: Patient is a 72y old  Female who presents with a chief complaint of R hip pain impairing her ability to ambulate (14 May 2018 18:57)    HPI: 72F h/o R femoral neck fx s/p CRPP at [facility] 10/17, CAD s/p multiple stents (remote), HTN, HLD, EtOH abuse, and anxiety, who is a community ambulator w/o assistive devices presents c/o months of progressively worsening R hip pain that has now become so bad that she is unable to ambulate. Says that the pain is mostly in her right groin and occasionally radiates down her anterior leg. Denies numbness/tingling. Denies fever/chills. Denies pain/injury elsewhere. Patient says that she is an active smoker. She also says that following her CRPP procedure, she had some sort of infection where her legs get red and swollen and she required antibiotics, possible cellulitis.     Of note, patient says that her cardiologist is Dr. Tera Romero, a doctor affiliated with [place]. She reports having had an echo couple years ago but not recently. She's c/o SOB "every now and then on exertion". She can only walk 1 block before feeling SOB. She denies any chest pain. She cares for herself and performs all her ADLs.    Pain Symptoms if applicable:             	                         none	   mild    moderate    severe  	                            0	    1-3	     4-6	         7-10  Pain: 8  Location: right hip/groin	  Modifying factors: pain with walking	  Associated symptoms: right leg pain	    Allergies: No Known Allergies    HOME MEDICATIONS: [x] Reviewed  lisinopril 20 milliGRAM(s) Oral daily  metoprolol tartrate 25 milliGRAM(s) Oral two times a day  norvasc  simvastatin 20 milliGRAM(s) Oral at bedtime  acetaminophen-cod #4 tablet  [incomplete}    MEDICATIONS  (STANDING):  aspirin enteric coated 81 milliGRAM(s) Oral daily  docusate sodium 100 milliGRAM(s) Oral three times a day  heparin  Injectable 5000 Unit(s) SubCutaneous every 8 hours  lisinopril 20 milliGRAM(s) Oral daily  metoprolol tartrate 25 milliGRAM(s) Oral two times a day  nicotine - 21 mG/24Hr(s) Patch 1 patch Transdermal daily  senna 2 Tablet(s) Oral at bedtime  simvastatin 20 milliGRAM(s) Oral at bedtime    iSTOP Reference #: [id number]  Written	Rx       Dispensed	            Drug	                     Quantity	Days Supply	Prescriber Name  04/30/2018	05/07/2018	acetaminophen-cod #4 tablet	60	10	            Navdeep Montez MD  03/06/2018	03/06/2018	acetaminophen-cod #4 tablet	60	10	            Navdeep Montez MD  01/31/2018	02/02/2018	acetaminophen-cod #4 tablet	60	15	            Navdeep Montez MD  01/16/2018	01/17/2018	acetaminophen-cod #3 tablet	20	5	            [facility]  01/08/2018	01/08/2018	acetaminophen-cod #4 tablet	60	15	            Navdeep Montez MD  12/28/2017	12/28/2017	virtussin ac liquid	            236	8	            Brittni Srivastava RPA-C  11/29/2017	11/29/2017	percocet 5-325 mg tab	            28	7	            Navdeep Montez MD  11/06/2017	11/07/2017	percocet 5-325 mg tab	            12	3	            Pelon Vargas  10/24/2017	10/24/2017	oxycodone hcl 5 mg tablet	30	5	            [facility]    MEDICATIONS  (PRN):  acetaminophen   Tablet. 650 milliGRAM(s) Oral every 6 hours PRN Mild Pain (1 - 3)  HYDROmorphone   Tablet 2 milliGRAM(s) Oral every 3 hours PRN Moderate Pain (4 - 6)  HYDROmorphone   Tablet 4 milliGRAM(s) Oral every 3 hours PRN Severe Pain (7 - 10)  morphine  - Injectable 2 milliGRAM(s) IV Push every 4 hours PRN severe breakthrough pain unrelieved by oxycodone  traZODone 100 milliGRAM(s) Oral at bedtime PRN insomnia    PAST MEDICAL & SURGICAL HISTORY:  Pain of right hip joint  Migraine  Alcohol abuse  Seizure  Stented coronary artery  Coronary artery disease  Anxiety  HTN (hypertension)  Emphysema, unspecified  Anxiety  Migraine  CAD (coronary artery disease)  Hyperlipidemia  Hypertension  No significant past surgical history  S/P bladder repair  [x ] Reviewed     SOCIAL HISTORY:  [x] Substance abuse: reports none  [x] Alcohol use: reports none  [x] Tobacco: smoker 1ppd    FAMILY HISTORY:  Family history of coronary artery disease in daughter (Child)    REVIEW OF SYSTEMS:  [x] All other ROS negative      Vital Signs Last 24 Hrs  T(C): 36.4 (15 May 2018 09:43), Max: 37.2 (14 May 2018 18:30)  T(F): 97.6 (15 May 2018 09:43), Max: 99 (14 May 2018 18:30)  HR: 63 (15 May 2018 09:43) (61 - 83)  BP: 149/59 (15 May 2018 09:43) (108/54 - 165/83)  BP(mean): --  RR: 18 (15 May 2018 09:43) (18 - 18)  SpO2: 100% (15 May 2018 09:43) (98% - 100%)    PHYSICAL EXAM:  GENERAL: NAD, well-groomed, well-developed  HEAD:  Atraumatic, Normocephalic  EYES: EOMI, PERRLA, conjunctiva and sclera clear  ENMT: Moist mucous membranes  NECK: Supple, No JVD  RESPIRATORY: few rhonchi  CARDIOVASCULAR: Regular rate and rhythm; 2/6 YOLY   GASTROINTESTINAL: Soft, Nontender, Nondistended; Bowel sounds present  EXTREMITIES:  2+ Peripheral Pulses, No clubbing, cyanosis, or edema  NERVOUS SYSTEM:  Alert & Oriented X3; Moving all 4 extremities; No gross sensory deficits      LABS:                        12.3   5.91  )-----------( 259      ( 15 May 2018 05:53 )             38.0     Hemoglobin: 12.3 g/dL (05-15 @ 05:53)  Hemoglobin: 12.9 g/dL (05-14 @ 12:09)    05-15    140  |  101  |  23  ----------------------------<  91  4.1   |  28  |  0.76    Ca    8.8      15 May 2018 05:53    TPro  7.3  /  Alb  4.0  /  TBili  < 0.2<L>  /  DBili  x   /  AST  18  /  ALT  12  /  AlkPhos  76  05-14    PT/INR - ( 15 May 2018 05:53 )   PT: 11.0 SEC;   INR: 0.99     PTT - ( 15 May 2018 05:53 )  PTT:30.3 SEC    RADIOLOGY & ADDITIONAL STUDIES:    Imaging:   Personally Reviewed:  [x] YES               [ ] Consultant(s) Notes Reviewed  [x] Care Discussed with Consultants/Other Providers: Ortho PA - discussed pre-op clearance

## 2018-05-15 NOTE — CONSULT NOTE ADULT - SUBJECTIVE AND OBJECTIVE BOX
CHIEF COMPLAINT:Patient is a 72y old  Female who presents with a chief complaint of R hip pain impairing her ability to ambulate (14 May 2018 18:57)      HISTORY OF PRESENT ILLNESS:  This is a 72 f with history as below namely cad sp multiple stents in the past   now with AVN of hip plan for USAMA  has GRAY  fatigue  no chest pain   no dizziness     PAST MEDICAL & SURGICAL HISTORY:  Pain of right hip joint  Migraine  Alcohol abuse  Seizure  Stented coronary artery  Coronary artery disease  Anxiety  HTN (hypertension)  Emphysema, unspecified  Anxiety  Migraine  CAD (coronary artery disease)  Hyperlipidemia  Hypertension  No significant past surgical history  S/P bladder repair          MEDICATIONS:  heparin  Injectable 5000 Unit(s) SubCutaneous every 8 hours  lisinopril 20 milliGRAM(s) Oral daily  metoprolol tartrate 25 milliGRAM(s) Oral two times a day        acetaminophen   Tablet. 650 milliGRAM(s) Oral every 6 hours PRN  HYDROmorphone   Tablet 2 milliGRAM(s) Oral every 3 hours PRN  HYDROmorphone   Tablet 4 milliGRAM(s) Oral every 3 hours PRN  morphine  - Injectable 2 milliGRAM(s) IV Push every 4 hours PRN  traZODone 100 milliGRAM(s) Oral at bedtime PRN    docusate sodium 100 milliGRAM(s) Oral three times a day  senna 2 Tablet(s) Oral at bedtime    simvastatin 20 milliGRAM(s) Oral at bedtime        FAMILY HISTORY:  Family history of coronary artery disease in daughter (Child)      Non-contributory    SOCIAL HISTORY:    pos tobacco 1 ppd    Allergies    No Known Allergies    Intolerances    	    REVIEW OF SYSTEMS:  as above  The rest of the 14 points ROS reviewed and except above they are unremarkable.        PHYSICAL EXAM:  T(C): 36.4 (05-15-18 @ 09:43), Max: 37.2 (05-14-18 @ 18:30)  HR: 63 (05-15-18 @ 09:43) (61 - 83)  BP: 149/59 (05-15-18 @ 09:43) (108/54 - 165/83)  RR: 18 (05-15-18 @ 09:43) (18 - 18)  SpO2: 100% (05-15-18 @ 09:43) (98% - 100%)  Wt(kg): --  I&O's Summary    14 May 2018 07:01  -  15 May 2018 07:00  --------------------------------------------------------  IN: 0 mL / OUT: 425 mL / NET: -425 mL      JVP: Normal  Neck: supple  Lung: few rhnchi   CV: S1 S2 , Murmur: 2.6 gardenia   Abd: soft  Ext: No edema  neuro: Awake / alert  Psych: flat affect  Skin: normal     LABS/DATA:    TELEMETRY: 	    ECG:  	   	  CARDIAC MARKERS:                                  12.3   5.91  )-----------( 259      ( 15 May 2018 05:53 )             38.0     05-15    140  |  101  |  23  ----------------------------<  91  4.1   |  28  |  0.76    Ca    8.8      15 May 2018 05:53    TPro  7.3  /  Alb  4.0  /  TBili  < 0.2<L>  /  DBili  x   /  AST  18  /  ALT  12  /  AlkPhos  76  05-14    proBNP:   Lipid Profile:   HgA1c:   TSH:       < from: 12 Lead ECG (05.14.18 @ 19:32) >  Diagnosis Line Normal sinus rhythm  Possible Left atrial enlargement  Borderline ECG    < end of copied text >

## 2018-05-15 NOTE — CHART NOTE - NSCHARTNOTEFT_GEN_A_CORE
medications updated and verified with pharmacy ([facility]  [phone]) and PMD Dr. Montez [phone]

## 2018-05-15 NOTE — CONSULT NOTE ADULT - ASSESSMENT
cad   history stent  cont statin  resume asa    htn  stable    tobacco  counselling given     PreOp  Based on current ACC/AHA guidelines, patient history and physical exam, the patient is considered to have high risk  given GRAY will get echo and stress test

## 2018-05-15 NOTE — CONSULT NOTE ADULT - PROBLEM SELECTOR RECOMMENDATION 5
-will need to clarify home anxiolytic meds and resume to avoid benzo withdrawal  -patient with h/o benzodiazepine withdrawal with perceptual disturbance  -CIWA symptom triggered

## 2018-05-15 NOTE — CONSULT NOTE ADULT - ASSESSMENT
72F h/o R femoral head AVN s/p CRPP at [facility] 10/17, CAD s/p multiple stents (remote), HTN, HLD, EtOH abuse, anxiety, COPD, current smoker, who is a community ambulator w/o assistive devices presents c/o months of progressively worsening R hip pain found to have on XRAY femoral head necrosis with collapse and backing out of the screws awaiting USAMA this week.

## 2018-05-15 NOTE — PHYSICAL THERAPY INITIAL EVALUATION ADULT - ACTIVE RANGE OF MOTION EXAMINATION, REHAB EVAL
bilateral upper extremity Active ROM was WFL (within functional limits)/bilateral  lower extremity Active ROM was WFL (within functional limits)/except right hip flexion 0-60 degrees; right knee ext -10 degrees from neutral

## 2018-05-15 NOTE — CONSULT NOTE ADULT - PROBLEM SELECTOR RECOMMENDATION 9
-per orthopedic plan to do infection workup with IR guided aspirate  -if negative, then plan for USAMA sometime this week  -WBAT RLE  -will need cardiology for pre-op clearance  -will order 2D echo and NST -c/w heparin subq tid

## 2018-05-16 DIAGNOSIS — F13.10 SEDATIVE, HYPNOTIC OR ANXIOLYTIC ABUSE, UNCOMPLICATED: ICD-10-CM

## 2018-05-16 LAB
ALBUMIN SERPL ELPH-MCNC: 3.3 G/DL — SIGNIFICANT CHANGE UP (ref 3.3–5)
ALP SERPL-CCNC: 63 U/L — SIGNIFICANT CHANGE UP (ref 40–120)
ALT FLD-CCNC: 9 U/L — SIGNIFICANT CHANGE UP (ref 4–33)
AST SERPL-CCNC: 15 U/L — SIGNIFICANT CHANGE UP (ref 4–32)
BILIRUB DIRECT SERPL-MCNC: 0.1 MG/DL — SIGNIFICANT CHANGE UP (ref 0.1–0.2)
BILIRUB SERPL-MCNC: 0.3 MG/DL — SIGNIFICANT CHANGE UP (ref 0.2–1.2)
BUN SERPL-MCNC: 21 MG/DL — SIGNIFICANT CHANGE UP (ref 7–23)
CALCIUM SERPL-MCNC: 8.9 MG/DL — SIGNIFICANT CHANGE UP (ref 8.4–10.5)
CHLORIDE SERPL-SCNC: 100 MMOL/L — SIGNIFICANT CHANGE UP (ref 98–107)
CO2 SERPL-SCNC: 28 MMOL/L — SIGNIFICANT CHANGE UP (ref 22–31)
CREAT SERPL-MCNC: 0.62 MG/DL — SIGNIFICANT CHANGE UP (ref 0.5–1.3)
GLUCOSE SERPL-MCNC: 119 MG/DL — HIGH (ref 70–99)
HCT VFR BLD CALC: 38.4 % — SIGNIFICANT CHANGE UP (ref 34.5–45)
HGB BLD-MCNC: 12.5 G/DL — SIGNIFICANT CHANGE UP (ref 11.5–15.5)
MAGNESIUM SERPL-MCNC: 1.9 MG/DL — SIGNIFICANT CHANGE UP (ref 1.6–2.6)
MCHC RBC-ENTMCNC: 32.6 % — SIGNIFICANT CHANGE UP (ref 32–36)
MCHC RBC-ENTMCNC: 32.6 PG — SIGNIFICANT CHANGE UP (ref 27–34)
MCV RBC AUTO: 100.3 FL — HIGH (ref 80–100)
NRBC # FLD: 0 — SIGNIFICANT CHANGE UP
PHOSPHATE SERPL-MCNC: 3.3 MG/DL — SIGNIFICANT CHANGE UP (ref 2.5–4.5)
PLATELET # BLD AUTO: 230 K/UL — SIGNIFICANT CHANGE UP (ref 150–400)
PMV BLD: 8.6 FL — SIGNIFICANT CHANGE UP (ref 7–13)
POTASSIUM SERPL-MCNC: 4.3 MMOL/L — SIGNIFICANT CHANGE UP (ref 3.5–5.3)
POTASSIUM SERPL-SCNC: 4.3 MMOL/L — SIGNIFICANT CHANGE UP (ref 3.5–5.3)
PROT SERPL-MCNC: 6.6 G/DL — SIGNIFICANT CHANGE UP (ref 6–8.3)
RBC # BLD: 3.83 M/UL — SIGNIFICANT CHANGE UP (ref 3.8–5.2)
RBC # FLD: 14.2 % — SIGNIFICANT CHANGE UP (ref 10.3–14.5)
SODIUM SERPL-SCNC: 139 MMOL/L — SIGNIFICANT CHANGE UP (ref 135–145)
WBC # BLD: 6.48 K/UL — SIGNIFICANT CHANGE UP (ref 3.8–10.5)
WBC # FLD AUTO: 6.48 K/UL — SIGNIFICANT CHANGE UP (ref 3.8–10.5)

## 2018-05-16 PROCEDURE — 78452 HT MUSCLE IMAGE SPECT MULT: CPT | Mod: 26

## 2018-05-16 PROCEDURE — 93306 TTE W/DOPPLER COMPLETE: CPT | Mod: 26

## 2018-05-16 PROCEDURE — 93016 CV STRESS TEST SUPVJ ONLY: CPT | Mod: GC

## 2018-05-16 PROCEDURE — 93018 CV STRESS TEST I&R ONLY: CPT | Mod: GC

## 2018-05-16 RX ORDER — LIDOCAINE 4 G/100G
1 CREAM TOPICAL DAILY
Qty: 0 | Refills: 0 | Status: DISCONTINUED | OUTPATIENT
Start: 2018-05-16 | End: 2018-05-22

## 2018-05-16 RX ORDER — HYDROMORPHONE HYDROCHLORIDE 2 MG/ML
1 INJECTION INTRAMUSCULAR; INTRAVENOUS; SUBCUTANEOUS
Qty: 0 | Refills: 0 | Status: DISCONTINUED | OUTPATIENT
Start: 2018-05-16 | End: 2018-05-17

## 2018-05-16 RX ORDER — GABAPENTIN 400 MG/1
200 CAPSULE ORAL THREE TIMES A DAY
Qty: 0 | Refills: 0 | Status: DISCONTINUED | OUTPATIENT
Start: 2018-05-16 | End: 2018-05-23

## 2018-05-16 RX ORDER — TRAMADOL HYDROCHLORIDE 50 MG/1
50 TABLET ORAL EVERY 8 HOURS
Qty: 0 | Refills: 0 | Status: DISCONTINUED | OUTPATIENT
Start: 2018-05-16 | End: 2018-05-21

## 2018-05-16 RX ORDER — LANOLIN ALCOHOL/MO/W.PET/CERES
6 CREAM (GRAM) TOPICAL AT BEDTIME
Qty: 0 | Refills: 0 | Status: DISCONTINUED | OUTPATIENT
Start: 2018-05-16 | End: 2018-05-25

## 2018-05-16 RX ORDER — HYDROMORPHONE HYDROCHLORIDE 2 MG/ML
6 INJECTION INTRAMUSCULAR; INTRAVENOUS; SUBCUTANEOUS EVERY 6 HOURS
Qty: 0 | Refills: 0 | Status: DISCONTINUED | OUTPATIENT
Start: 2018-05-16 | End: 2018-05-22

## 2018-05-16 RX ORDER — ACETAMINOPHEN 500 MG
1000 TABLET ORAL ONCE
Qty: 0 | Refills: 0 | Status: DISCONTINUED | OUTPATIENT
Start: 2018-05-16 | End: 2018-05-16

## 2018-05-16 RX ORDER — MORPHINE SULFATE 50 MG/1
2 CAPSULE, EXTENDED RELEASE ORAL ONCE
Qty: 0 | Refills: 0 | Status: DISCONTINUED | OUTPATIENT
Start: 2018-05-16 | End: 2018-05-16

## 2018-05-16 RX ORDER — ACETAMINOPHEN 500 MG
750 TABLET ORAL ONCE
Qty: 0 | Refills: 0 | Status: COMPLETED | OUTPATIENT
Start: 2018-05-16 | End: 2018-05-16

## 2018-05-16 RX ORDER — TIZANIDINE 4 MG/1
2 TABLET ORAL EVERY 6 HOURS
Qty: 0 | Refills: 0 | Status: COMPLETED | OUTPATIENT
Start: 2018-05-16 | End: 2018-05-18

## 2018-05-16 RX ORDER — HYDROMORPHONE HYDROCHLORIDE 2 MG/ML
4 INJECTION INTRAMUSCULAR; INTRAVENOUS; SUBCUTANEOUS
Qty: 0 | Refills: 0 | Status: DISCONTINUED | OUTPATIENT
Start: 2018-05-16 | End: 2018-05-22

## 2018-05-16 RX ORDER — HYDROXYZINE HCL 10 MG
50 TABLET ORAL EVERY 6 HOURS
Qty: 0 | Refills: 0 | Status: DISCONTINUED | OUTPATIENT
Start: 2018-05-16 | End: 2018-05-17

## 2018-05-16 RX ORDER — HYDRALAZINE HCL 50 MG
5 TABLET ORAL ONCE
Qty: 0 | Refills: 0 | Status: DISCONTINUED | OUTPATIENT
Start: 2018-05-16 | End: 2018-05-25

## 2018-05-16 RX ORDER — TIZANIDINE 4 MG/1
2 TABLET ORAL EVERY 6 HOURS
Qty: 0 | Refills: 0 | Status: DISCONTINUED | OUTPATIENT
Start: 2018-05-19 | End: 2018-05-25

## 2018-05-16 RX ADMIN — HYDROMORPHONE HYDROCHLORIDE 4 MILLIGRAM(S): 2 INJECTION INTRAMUSCULAR; INTRAVENOUS; SUBCUTANEOUS at 18:24

## 2018-05-16 RX ADMIN — Medication 1 MILLIGRAM(S): at 13:40

## 2018-05-16 RX ADMIN — Medication 300 MILLIGRAM(S): at 09:56

## 2018-05-16 RX ADMIN — HYDROMORPHONE HYDROCHLORIDE 4 MILLIGRAM(S): 2 INJECTION INTRAMUSCULAR; INTRAVENOUS; SUBCUTANEOUS at 04:20

## 2018-05-16 RX ADMIN — Medication 25 MILLIGRAM(S): at 13:41

## 2018-05-16 RX ADMIN — Medication 1 PATCH: at 13:40

## 2018-05-16 RX ADMIN — TIZANIDINE 2 MILLIGRAM(S): 4 TABLET ORAL at 23:59

## 2018-05-16 RX ADMIN — MORPHINE SULFATE 2 MILLIGRAM(S): 50 CAPSULE, EXTENDED RELEASE ORAL at 08:31

## 2018-05-16 RX ADMIN — HYDROMORPHONE HYDROCHLORIDE 1 MILLIGRAM(S): 2 INJECTION INTRAMUSCULAR; INTRAVENOUS; SUBCUTANEOUS at 20:54

## 2018-05-16 RX ADMIN — MORPHINE SULFATE 2 MILLIGRAM(S): 50 CAPSULE, EXTENDED RELEASE ORAL at 11:50

## 2018-05-16 RX ADMIN — Medication 1 TABLET(S): at 07:12

## 2018-05-16 RX ADMIN — Medication 100 MILLIGRAM(S): at 13:41

## 2018-05-16 RX ADMIN — Medication 90 MILLIGRAM(S): at 07:12

## 2018-05-16 RX ADMIN — CARVEDILOL PHOSPHATE 12.5 MILLIGRAM(S): 80 CAPSULE, EXTENDED RELEASE ORAL at 17:51

## 2018-05-16 RX ADMIN — Medication 100 MILLIGRAM(S): at 07:12

## 2018-05-16 RX ADMIN — Medication 81 MILLIGRAM(S): at 13:40

## 2018-05-16 RX ADMIN — HYDROMORPHONE HYDROCHLORIDE 4 MILLIGRAM(S): 2 INJECTION INTRAMUSCULAR; INTRAVENOUS; SUBCUTANEOUS at 03:35

## 2018-05-16 RX ADMIN — MORPHINE SULFATE 2 MILLIGRAM(S): 50 CAPSULE, EXTENDED RELEASE ORAL at 12:05

## 2018-05-16 RX ADMIN — HYDROMORPHONE HYDROCHLORIDE 4 MILLIGRAM(S): 2 INJECTION INTRAMUSCULAR; INTRAVENOUS; SUBCUTANEOUS at 14:50

## 2018-05-16 RX ADMIN — HYDROMORPHONE HYDROCHLORIDE 4 MILLIGRAM(S): 2 INJECTION INTRAMUSCULAR; INTRAVENOUS; SUBCUTANEOUS at 07:12

## 2018-05-16 RX ADMIN — Medication 100 MILLIGRAM(S): at 13:40

## 2018-05-16 RX ADMIN — Medication 1 PATCH: at 15:48

## 2018-05-16 RX ADMIN — GABAPENTIN 200 MILLIGRAM(S): 400 CAPSULE ORAL at 21:15

## 2018-05-16 RX ADMIN — HYDROMORPHONE HYDROCHLORIDE 4 MILLIGRAM(S): 2 INJECTION INTRAMUSCULAR; INTRAVENOUS; SUBCUTANEOUS at 00:08

## 2018-05-16 RX ADMIN — HYDROMORPHONE HYDROCHLORIDE 4 MILLIGRAM(S): 2 INJECTION INTRAMUSCULAR; INTRAVENOUS; SUBCUTANEOUS at 08:00

## 2018-05-16 RX ADMIN — MORPHINE SULFATE 2 MILLIGRAM(S): 50 CAPSULE, EXTENDED RELEASE ORAL at 04:53

## 2018-05-16 RX ADMIN — HEPARIN SODIUM 5000 UNIT(S): 5000 INJECTION INTRAVENOUS; SUBCUTANEOUS at 13:41

## 2018-05-16 RX ADMIN — MORPHINE SULFATE 2 MILLIGRAM(S): 50 CAPSULE, EXTENDED RELEASE ORAL at 16:10

## 2018-05-16 RX ADMIN — Medication 25 MILLIGRAM(S): at 07:12

## 2018-05-16 RX ADMIN — MORPHINE SULFATE 2 MILLIGRAM(S): 50 CAPSULE, EXTENDED RELEASE ORAL at 04:37

## 2018-05-16 RX ADMIN — HYDROMORPHONE HYDROCHLORIDE 6 MILLIGRAM(S): 2 INJECTION INTRAMUSCULAR; INTRAVENOUS; SUBCUTANEOUS at 23:59

## 2018-05-16 RX ADMIN — Medication 1 TABLET(S): at 17:51

## 2018-05-16 RX ADMIN — Medication 6 MILLIGRAM(S): at 21:15

## 2018-05-16 RX ADMIN — MORPHINE SULFATE 2 MILLIGRAM(S): 50 CAPSULE, EXTENDED RELEASE ORAL at 08:16

## 2018-05-16 RX ADMIN — Medication 50 MILLIGRAM(S): at 17:51

## 2018-05-16 RX ADMIN — HYDROMORPHONE HYDROCHLORIDE 1 MILLIGRAM(S): 2 INJECTION INTRAMUSCULAR; INTRAVENOUS; SUBCUTANEOUS at 20:38

## 2018-05-16 RX ADMIN — CARVEDILOL PHOSPHATE 12.5 MILLIGRAM(S): 80 CAPSULE, EXTENDED RELEASE ORAL at 07:12

## 2018-05-16 RX ADMIN — MORPHINE SULFATE 2 MILLIGRAM(S): 50 CAPSULE, EXTENDED RELEASE ORAL at 15:54

## 2018-05-16 RX ADMIN — Medication 1 TABLET(S): at 13:40

## 2018-05-16 RX ADMIN — HYDROMORPHONE HYDROCHLORIDE 4 MILLIGRAM(S): 2 INJECTION INTRAMUSCULAR; INTRAVENOUS; SUBCUTANEOUS at 14:08

## 2018-05-16 RX ADMIN — Medication 25 MILLIGRAM(S): at 21:15

## 2018-05-16 RX ADMIN — TRAMADOL HYDROCHLORIDE 50 MILLIGRAM(S): 50 TABLET ORAL at 21:15

## 2018-05-16 NOTE — PROVIDER CONTACT NOTE (OTHER) - RECOMMENDATIONS
To increase the dosage of the pain medication or prescribe a new pain medication. Have pain management see the patient.

## 2018-05-16 NOTE — PROVIDER CONTACT NOTE (OTHER) - ACTION/TREATMENT ORDERED:
Tramadol 50mg PO every 8 hours has been prescribed. Pt refused Tramadol 50mg PO because "...it doesn't help me, and it's not a narcotic."

## 2018-05-16 NOTE — BEHAVIORAL HEALTH ASSESSMENT NOTE - NSBHCHARTREVIEWINVESTIGATE_PSY_A_CORE FT
Ventricular Rate 82 BPM    Atrial Rate 82 BPM    P-R Interval 126 ms    QRS Duration 76 ms    Q-T Interval 374 ms    QTC Calculation(Bezet) 436 ms    P Axis 78 degrees    R Axis 79 degrees    T Axis 32 degrees    Diagnosis Line Normal sinus rhythm  Possible Left atrial enlargement  Borderline ECG

## 2018-05-16 NOTE — BEHAVIORAL HEALTH ASSESSMENT NOTE - DESCRIPTION
R femoral neck fx s/p CRPP at [facility] (10/17) CAD s/p multiple stents (remote), HTN, HLD

## 2018-05-16 NOTE — BEHAVIORAL HEALTH ASSESSMENT NOTE - CASE SUMMARY
73 yo female, with past psychiatric history of anxiety NOS and benzodiazepine abuse (w/ reported hist of seizures from benzo withdrawal), alcohol abuse (no hist withdrawal), PMH osteonecrosis R femoral neck s/p CRPP, CAD (s/p stents), HTN, HLD, psych consulted for anxiety and insomnia.   Pt. seen and evaluated, AAOX4, cooperative but somewhat anxious. Patient denies SI and HI, denies psychotic sxs. Patient reported feeling anxious in the context of ongoing medical issues and pain. Patient stated that Atarax did not help her and reported that she did well on Seroquel in the past. Given pt.'s age, currently on opioids and has h/o benzo. abuse, options are limited at this time, recommend to start Seroquel 50mg po Q6h PRN for anxiety and insomnia, discontinue Atarax PRN. Consider increasing Gabapentin as it can help with anxiety. Monitor EKG for qtc.

## 2018-05-16 NOTE — BEHAVIORAL HEALTH ASSESSMENT NOTE - SUMMARY
Pt is a 73yo retired F, domiciled at home with . Pt has history of anxiety and benzodiazapine abuse, with a reported history of seizures from benzo withdrawal, Pt has history of ETOH abuse, denies withdrawals or DT's.  Pt has no history of psychiatric hospitalizations. Pt is current smoker (1pack per day). Pmhx of R femoral neck fx s/p CRPP at [facility] (10/17) CAD s/p multiple stents (remote), HTN, HLD. Pt presents to ed with progressively worsening R hip pain found to have on XRAY femoral head necrosis with collapse and backing out of the screws awaiting USAMA this week.  Psychiatry consulted for anxiety.  Seen and examined. Pt a&ox4. Pt perseverating on pain, constantly seeking pain medication and requesting ativan IVP. Per daughter pt has history of benzo abuse last used 6-8 months ago. Per daughter pt rarely drinks. Pt has not scored on CIWA since admission.   Plan  1. D/C CIWA and symptom triggered ativan   2. Anxiety; would want to limit benzos given recent abuse . Would recommend  Atarax 50mg PRN q6hr  3. Melatonin 6mg q hs   4. Would optimize pain management   Psychiatry will follow

## 2018-05-16 NOTE — PROVIDER CONTACT NOTE (OTHER) - ASSESSMENT
Pt's 2AM BP read as 193/87. Dr. Chan was notified and was notified earlier for 5/15/18 11PM VS BP was 182/62. Was recommended that Hydralazine 25mg PO to be given. At 1:47am pt denied SOB, chest pain, or headaches. Pt was asymptomatic.

## 2018-05-16 NOTE — PROVIDER CONTACT NOTE (OTHER) - ACTION/TREATMENT ORDERED:
Hydralazine 5mg IVP was ordered. Retook BP before administering Hydralazine 5mg IVP and the BP came down to 153/53. Dr. Chan notified and recommended to hold the 1 time dose of Hydralazine IVP.

## 2018-05-16 NOTE — BEHAVIORAL HEALTH ASSESSMENT NOTE - HPI (INCLUDE ILLNESS QUALITY, SEVERITY, DURATION, TIMING, CONTEXT, MODIFYING FACTORS, ASSOCIATED SIGNS AND SYMPTOMS)
Pt is a 71yo retired F, domiciled at home with . Pt has history of anxiety and benzodiazapine abuse, with a reported history of seizures from benzo withdrawal, Pt has history of ETOH abuse, denies withdrawals or DT's.  Pt has no history of psychiatric hospitalizations. Pt is current smoker (1pack per day). Pmhx of R femoral neck fx s/p CRPP at [facility] (10/17) CAD s/p multiple stents (remote), HTN, HLD. Pt presents to ed with progressively worsening R hip pain found to have on XRAY femoral head necrosis with collapse and backing out of the screws awaiting USAMA this week.  Psychiatry consulted for anxiety.  Chart reviewed. Pt score 0 CIWA since initiated, no symptom triggered prn' s needed.   Seen and examined. Pt a&ox4. Pt presents with anxiety , complaining about pain and seeking pain medication. States that she is s/p CRPP at [facility] (10/17). States that the procedure was not done correctly and that it is causing her severe pain. Pt states that she does not feel that she is being managed effectively for her pain. Pt States that she has history of anxiety and poor sleep. She describes her anxiety as having heart palpitations and feeling like she is panicking. Reports having a panic attack at the age of 52 when she began menopause. Pt reports seeing a psychiatrist at that time and being prescribed xanax, which she took as needed for 1.5 years. States that she currently suffers from anxiety and has trouble sleeping. States that she has a history of abusing ETOH and benzo. States that she drinks 1-2 drinks per month on special occasions . States that her last drink was on mothers day (5/13) where she drank 1 drink.  States that she no longer abuses benzodiazepines. Pt denies depressive symptoms, states that she has no psychiatric history other then the panic attack and anxiety. Pt denies SI/HI. Denies audio/ visual/ tactile/ hallucinations . No psychotic symptoms elicited during interview. Pt perseverating on pain throughout the interview.   Per staff pt has been complaining of pain through out the day and constantly requesting pain medication and ativan IVP.   Collateral received from pt's daughter Brittni( daughter is a PA at [employer]). States that pt has history of benzodiazapine abuse, states that pt had a seizure due to benzo withdrawal several years ago. States that pt was most recently abusing benzodiazapine 6-8 months ago. States that pt stopped on her own. States that pt rarely drinks. States that she is not concerned about her mothers drinking. States that pt has always difficulty sleeping. Denies other psychiatric history.  No  report available for this patient

## 2018-05-16 NOTE — BEHAVIORAL HEALTH ASSESSMENT NOTE - NSBHCHARTREVIEWVS_PSY_A_CORE FT
Vital Signs Last 24 Hrs  T(C): 36.7 (16 May 2018 17:11), Max: 36.9 (15 May 2018 23:04)  T(F): 98 (16 May 2018 17:11), Max: 98.4 (15 May 2018 23:04)  HR: 67 (16 May 2018 17:11) (64 - 100)  BP: 166/64 (16 May 2018 17:11) (153/53 - 193/81)  BP(mean): --  RR: 18 (16 May 2018 17:11) (16 - 18)  SpO2: 100% (16 May 2018 17:11) (75% - 100%)

## 2018-05-16 NOTE — PROVIDER CONTACT NOTE (OTHER) - BACKGROUND
Pt admitted 5/14/18 for Rt hip pain. 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 PMH of HTN, seizures, Anxiety, and alcohol abuse.

## 2018-05-16 NOTE — PROVIDER CONTACT NOTE (OTHER) - BACKGROUND
Pt admitted 5/14/18 for rt hip pain. Admitting DX of idiopathic aseptic necrosis of rt femur. PMH of HTN, Seizure, Anxiety, and Alcohol abuse.

## 2018-05-16 NOTE — BEHAVIORAL HEALTH ASSESSMENT NOTE - RISK ASSESSMENT
Risk factors include acute and chronic medical issues. Hx of ETOH and benzodiazapine abuse., anxiety  Protective factors include stable domicile, social support, no past psych hosp or SA,   Pt denies SI/HI/AH/VH, manic or psychotic symptoms.  Pt does not present an acute risk of harm to self or others.  Pt does not require inpatient psychiatric hospitalization.

## 2018-05-16 NOTE — BEHAVIORAL HEALTH ASSESSMENT NOTE - NSBHCHARTREVIEWLAB_PSY_A_CORE FT
05-16    139  |  100  |  21  ----------------------------<  119<H>  4.3   |  28  |  0.62    Ca    8.9      16 May 2018 11:50  Phos  3.3     05-16  Mg     1.9     05-16    TPro  6.6  /  Alb  3.3  /  TBili  0.3  /  DBili  0.1  /  AST  15  /  ALT  9   /  AlkPhos  63  05-16                        12.5   6.48  )-----------( 230      ( 16 May 2018 11:50 )             38.4

## 2018-05-16 NOTE — PROVIDER CONTACT NOTE (OTHER) - ASSESSMENT
Pt c/o pain of 9/10 even after receiving Dilaudid 4mg PO and Morphine 2mg IVP. Pain is radiating down to rt foot. Pt has +NVS.

## 2018-05-17 DIAGNOSIS — F43.22 ADJUSTMENT DISORDER WITH ANXIETY: ICD-10-CM

## 2018-05-17 LAB
APTT BLD: 30 SEC — SIGNIFICANT CHANGE UP (ref 27.5–37.4)
BLD GP AB SCN SERPL QL: NEGATIVE — SIGNIFICANT CHANGE UP
BUN SERPL-MCNC: 26 MG/DL — HIGH (ref 7–23)
CALCIUM SERPL-MCNC: 9.2 MG/DL — SIGNIFICANT CHANGE UP (ref 8.4–10.5)
CHLORIDE SERPL-SCNC: 99 MMOL/L — SIGNIFICANT CHANGE UP (ref 98–107)
CO2 SERPL-SCNC: 32 MMOL/L — HIGH (ref 22–31)
CREAT SERPL-MCNC: 0.83 MG/DL — SIGNIFICANT CHANGE UP (ref 0.5–1.3)
GLUCOSE SERPL-MCNC: 92 MG/DL — SIGNIFICANT CHANGE UP (ref 70–99)
HCT VFR BLD CALC: 37.5 % — SIGNIFICANT CHANGE UP (ref 34.5–45)
HGB BLD-MCNC: 12.2 G/DL — SIGNIFICANT CHANGE UP (ref 11.5–15.5)
INR BLD: 1.02 — SIGNIFICANT CHANGE UP (ref 0.88–1.17)
MCHC RBC-ENTMCNC: 32.5 % — SIGNIFICANT CHANGE UP (ref 32–36)
MCHC RBC-ENTMCNC: 33 PG — SIGNIFICANT CHANGE UP (ref 27–34)
MCV RBC AUTO: 101.4 FL — HIGH (ref 80–100)
NRBC # FLD: 0 — SIGNIFICANT CHANGE UP
PLATELET # BLD AUTO: 242 K/UL — SIGNIFICANT CHANGE UP (ref 150–400)
PMV BLD: 8.9 FL — SIGNIFICANT CHANGE UP (ref 7–13)
POTASSIUM SERPL-MCNC: 4.4 MMOL/L — SIGNIFICANT CHANGE UP (ref 3.5–5.3)
POTASSIUM SERPL-SCNC: 4.4 MMOL/L — SIGNIFICANT CHANGE UP (ref 3.5–5.3)
PROTHROM AB SERPL-ACNC: 11.3 SEC — SIGNIFICANT CHANGE UP (ref 9.8–13.1)
RBC # BLD: 3.7 M/UL — LOW (ref 3.8–5.2)
RBC # FLD: 14.2 % — SIGNIFICANT CHANGE UP (ref 10.3–14.5)
RH IG SCN BLD-IMP: POSITIVE — SIGNIFICANT CHANGE UP
SODIUM SERPL-SCNC: 136 MMOL/L — SIGNIFICANT CHANGE UP (ref 135–145)
WBC # BLD: 5.79 K/UL — SIGNIFICANT CHANGE UP (ref 3.8–10.5)
WBC # FLD AUTO: 5.79 K/UL — SIGNIFICANT CHANGE UP (ref 3.8–10.5)

## 2018-05-17 PROCEDURE — 99233 SBSQ HOSP IP/OBS HIGH 50: CPT

## 2018-05-17 PROCEDURE — 90792 PSYCH DIAG EVAL W/MED SRVCS: CPT

## 2018-05-17 RX ORDER — HYDRALAZINE HCL 50 MG
50 TABLET ORAL EVERY 8 HOURS
Qty: 0 | Refills: 0 | Status: DISCONTINUED | OUTPATIENT
Start: 2018-05-17 | End: 2018-05-25

## 2018-05-17 RX ORDER — QUETIAPINE FUMARATE 200 MG/1
50 TABLET, FILM COATED ORAL EVERY 6 HOURS
Qty: 0 | Refills: 0 | Status: DISCONTINUED | OUTPATIENT
Start: 2018-05-17 | End: 2018-05-17

## 2018-05-17 RX ORDER — QUETIAPINE FUMARATE 200 MG/1
50 TABLET, FILM COATED ORAL EVERY 6 HOURS
Qty: 0 | Refills: 0 | Status: DISCONTINUED | OUTPATIENT
Start: 2018-05-17 | End: 2018-05-25

## 2018-05-17 RX ORDER — SODIUM CHLORIDE 9 MG/ML
1000 INJECTION INTRAMUSCULAR; INTRAVENOUS; SUBCUTANEOUS
Qty: 0 | Refills: 0 | Status: DISCONTINUED | OUTPATIENT
Start: 2018-05-17 | End: 2018-05-20

## 2018-05-17 RX ORDER — MORPHINE SULFATE 50 MG/1
2 CAPSULE, EXTENDED RELEASE ORAL
Qty: 0 | Refills: 0 | Status: DISCONTINUED | OUTPATIENT
Start: 2018-05-17 | End: 2018-05-22

## 2018-05-17 RX ADMIN — TIZANIDINE 2 MILLIGRAM(S): 4 TABLET ORAL at 11:39

## 2018-05-17 RX ADMIN — MORPHINE SULFATE 2 MILLIGRAM(S): 50 CAPSULE, EXTENDED RELEASE ORAL at 15:50

## 2018-05-17 RX ADMIN — LIDOCAINE 1 PATCH: 4 CREAM TOPICAL at 21:10

## 2018-05-17 RX ADMIN — MORPHINE SULFATE 2 MILLIGRAM(S): 50 CAPSULE, EXTENDED RELEASE ORAL at 21:09

## 2018-05-17 RX ADMIN — HYDROMORPHONE HYDROCHLORIDE 1 MILLIGRAM(S): 2 INJECTION INTRAMUSCULAR; INTRAVENOUS; SUBCUTANEOUS at 01:58

## 2018-05-17 RX ADMIN — TRAMADOL HYDROCHLORIDE 50 MILLIGRAM(S): 50 TABLET ORAL at 06:21

## 2018-05-17 RX ADMIN — QUETIAPINE FUMARATE 50 MILLIGRAM(S): 200 TABLET, FILM COATED ORAL at 21:09

## 2018-05-17 RX ADMIN — Medication 81 MILLIGRAM(S): at 11:39

## 2018-05-17 RX ADMIN — TRAMADOL HYDROCHLORIDE 50 MILLIGRAM(S): 50 TABLET ORAL at 21:09

## 2018-05-17 RX ADMIN — Medication 1 PATCH: at 11:39

## 2018-05-17 RX ADMIN — HYDROMORPHONE HYDROCHLORIDE 1 MILLIGRAM(S): 2 INJECTION INTRAMUSCULAR; INTRAVENOUS; SUBCUTANEOUS at 02:13

## 2018-05-17 RX ADMIN — CARVEDILOL PHOSPHATE 12.5 MILLIGRAM(S): 80 CAPSULE, EXTENDED RELEASE ORAL at 18:36

## 2018-05-17 RX ADMIN — LIDOCAINE 1 PATCH: 4 CREAM TOPICAL at 00:03

## 2018-05-17 RX ADMIN — Medication 6 MILLIGRAM(S): at 21:09

## 2018-05-17 RX ADMIN — HYDROMORPHONE HYDROCHLORIDE 6 MILLIGRAM(S): 2 INJECTION INTRAMUSCULAR; INTRAVENOUS; SUBCUTANEOUS at 13:40

## 2018-05-17 RX ADMIN — LIDOCAINE 1 PATCH: 4 CREAM TOPICAL at 12:00

## 2018-05-17 RX ADMIN — Medication 1 TABLET(S): at 18:36

## 2018-05-17 RX ADMIN — MORPHINE SULFATE 2 MILLIGRAM(S): 50 CAPSULE, EXTENDED RELEASE ORAL at 09:44

## 2018-05-17 RX ADMIN — Medication 1 PATCH: at 11:48

## 2018-05-17 RX ADMIN — Medication 25 MILLIGRAM(S): at 06:19

## 2018-05-17 RX ADMIN — Medication 1 TABLET(S): at 06:19

## 2018-05-17 RX ADMIN — MORPHINE SULFATE 2 MILLIGRAM(S): 50 CAPSULE, EXTENDED RELEASE ORAL at 09:23

## 2018-05-17 RX ADMIN — TIZANIDINE 2 MILLIGRAM(S): 4 TABLET ORAL at 06:22

## 2018-05-17 RX ADMIN — Medication 90 MILLIGRAM(S): at 06:22

## 2018-05-17 RX ADMIN — TRAMADOL HYDROCHLORIDE 50 MILLIGRAM(S): 50 TABLET ORAL at 13:00

## 2018-05-17 RX ADMIN — HYDROMORPHONE HYDROCHLORIDE 6 MILLIGRAM(S): 2 INJECTION INTRAMUSCULAR; INTRAVENOUS; SUBCUTANEOUS at 18:36

## 2018-05-17 RX ADMIN — MORPHINE SULFATE 2 MILLIGRAM(S): 50 CAPSULE, EXTENDED RELEASE ORAL at 16:20

## 2018-05-17 RX ADMIN — Medication 50 MILLIGRAM(S): at 21:09

## 2018-05-17 RX ADMIN — HYDROMORPHONE HYDROCHLORIDE 6 MILLIGRAM(S): 2 INJECTION INTRAMUSCULAR; INTRAVENOUS; SUBCUTANEOUS at 06:25

## 2018-05-17 RX ADMIN — MORPHINE SULFATE 2 MILLIGRAM(S): 50 CAPSULE, EXTENDED RELEASE ORAL at 22:00

## 2018-05-17 RX ADMIN — HYDROMORPHONE HYDROCHLORIDE 6 MILLIGRAM(S): 2 INJECTION INTRAMUSCULAR; INTRAVENOUS; SUBCUTANEOUS at 12:57

## 2018-05-17 RX ADMIN — HYDROMORPHONE HYDROCHLORIDE 6 MILLIGRAM(S): 2 INJECTION INTRAMUSCULAR; INTRAVENOUS; SUBCUTANEOUS at 00:44

## 2018-05-17 RX ADMIN — GABAPENTIN 200 MILLIGRAM(S): 400 CAPSULE ORAL at 13:00

## 2018-05-17 RX ADMIN — TIZANIDINE 2 MILLIGRAM(S): 4 TABLET ORAL at 18:36

## 2018-05-17 RX ADMIN — QUETIAPINE FUMARATE 50 MILLIGRAM(S): 200 TABLET, FILM COATED ORAL at 11:39

## 2018-05-17 RX ADMIN — LIDOCAINE 1 PATCH: 4 CREAM TOPICAL at 11:39

## 2018-05-17 RX ADMIN — GABAPENTIN 200 MILLIGRAM(S): 400 CAPSULE ORAL at 21:09

## 2018-05-17 RX ADMIN — GABAPENTIN 200 MILLIGRAM(S): 400 CAPSULE ORAL at 06:19

## 2018-05-17 RX ADMIN — CARVEDILOL PHOSPHATE 12.5 MILLIGRAM(S): 80 CAPSULE, EXTENDED RELEASE ORAL at 06:20

## 2018-05-17 RX ADMIN — HYDROMORPHONE HYDROCHLORIDE 6 MILLIGRAM(S): 2 INJECTION INTRAMUSCULAR; INTRAVENOUS; SUBCUTANEOUS at 07:10

## 2018-05-17 NOTE — PROGRESS NOTE BEHAVIORAL HEALTH - SUMMARY
73 yo female, with past psychiatric history of anxiety NOS and benzodiazepine abuse (w/ reported hist of seizures from benzo withdrawal), alcohol abuse (no hist withdrawal), PMH osteonecrosis R femoral neck s/p CRPP, CAD (s/p stents), HTN, HLD, psych consulted for anxiety and insomnia. Patient denies depressed mood, SI/HI. She requests medication for her anxiety and insomnia, suggesting Ativan or Xanax.     Patient was counseled about the use of Seroquel for her anxiety and insomnia and acknowledged risks and benefits. She endorses taking Seroquel in the past and believes she took 200 mg.     Plan:  1. PRN Seroquel 50 mg PO q6hrs for anxiety or insomnia  2. Consider increasing gabapentin dosage as it can decrease anxiety. 73 yo female, with past psychiatric history of anxiety NOS and benzodiazepine abuse (w/ reported hist of seizures from benzo withdrawal), alcohol abuse (no hist withdrawal), PMH osteonecrosis R femoral neck s/p CRPP, CAD (s/p stents), HTN, HLD, psych consulted for anxiety and insomnia. Patient denies depressed mood, SI/HI. She requests medication for her anxiety and insomnia, suggesting Ativan or Xanax. Pt. reported that Atarax did not help last night. Pt. reported good response to Seroquel in th past.    Given h/o benzo. abuse, will avoid benzo. in elderly. Patient was counseled about the use of Seroquel for her anxiety and insomnia and acknowledged risks and benefits. She endorses taking Seroquel in the past and believes she took 200 mg.     Plan:  1. PRN Seroquel 50 mg PO q6hrs for anxiety or insomnia  2. Consider increasing gabapentin dosage as it can decrease anxiety.

## 2018-05-17 NOTE — PROGRESS NOTE BEHAVIORAL HEALTH - RISK ASSESSMENT
Risk factors-older age, substance abuse history, history of anxiety, medical co-morbidities, insomnia, caring for  who suffered a stroke  Protective factors- domiciled, , supportive family, help seeking, denies suicidal and homicidal ideations, no past suicide attempts    Based on risk assessment, this patient DOES NOT present a risk to self or others at this time.

## 2018-05-17 NOTE — PROGRESS NOTE BEHAVIORAL HEALTH - NSBHFUPINTERVALHXFT_PSY_A_CORE
73 yo female, psych hx of anxiety NOS, benzodiazepine abuse (w/ reported hist of seizures from benzo withdrawal), alcohol abuse (no hist withdrawal), PMH osteonecrosis R femoral neck s/p CRPP, CAD (s/p stents), HTN, HLD, psych consulted for anxiety and insomnia. On exam, patient is A&Ox4, very anxious, and standing up with assistance from a walker. She requests medication to help with her anxiety, offering suggestions such as Ativan and Xanax. Patient also requests medication to help her sleep at night as she has had trouble falling asleep due to severe pain. Denies depressed mood, suicidal ideation, or homicidal ideation. 73 yo female, psych hx of anxiety NOS, benzodiazepine abuse (w/ reported hist of seizures from benzo withdrawal), alcohol abuse (no hist withdrawal), PMH osteonecrosis R femoral neck s/p CRPP, CAD (s/p stents), HTN, HLD, psych consulted for anxiety and insomnia. On exam, patient is A&Ox4,  anxious, and standing up with assistance from a walker. She requests medication to help with her anxiety, offering suggestions such as Ativan and Xanax. Patient also requests medication to help her sleep at night as she has had trouble falling asleep due to severe pain. Denies depressed mood, suicidal ideation, or homicidal ideation. Denies psychotic sxs. Reported feeling anxious in the context of acute medical issues and ongoing pain.  Pt. was educated and counseled about benzo. use and educated that it is not safe for her to use benzo. given h/o benzo. abuse and also she is currently on opioid medications. She verbalized understanding and agreed with the plan.

## 2018-05-17 NOTE — PROGRESS NOTE BEHAVIORAL HEALTH - NSBHCONSULTFOLLOWAFTERCARE_PSY_A_CORE FT
N/a [facility] Geriatric outpt. clinic: [phone]  [facility] outpt. walk in clinic : [phone] (9AM-7PM)  [facility] Outpatient clinic: [phone]

## 2018-05-18 LAB
APTT BLD: 29.6 SEC — SIGNIFICANT CHANGE UP (ref 27.5–37.4)
BUN SERPL-MCNC: 28 MG/DL — HIGH (ref 7–23)
CALCIUM SERPL-MCNC: 8.9 MG/DL — SIGNIFICANT CHANGE UP (ref 8.4–10.5)
CHLORIDE SERPL-SCNC: 99 MMOL/L — SIGNIFICANT CHANGE UP (ref 98–107)
CO2 SERPL-SCNC: 30 MMOL/L — SIGNIFICANT CHANGE UP (ref 22–31)
CREAT SERPL-MCNC: 0.85 MG/DL — SIGNIFICANT CHANGE UP (ref 0.5–1.3)
GLUCOSE SERPL-MCNC: 93 MG/DL — SIGNIFICANT CHANGE UP (ref 70–99)
HCT VFR BLD CALC: 38 % — SIGNIFICANT CHANGE UP (ref 34.5–45)
HGB BLD-MCNC: 12.2 G/DL — SIGNIFICANT CHANGE UP (ref 11.5–15.5)
INR BLD: 0.94 — SIGNIFICANT CHANGE UP (ref 0.88–1.17)
MCHC RBC-ENTMCNC: 32.1 % — SIGNIFICANT CHANGE UP (ref 32–36)
MCHC RBC-ENTMCNC: 32.4 PG — SIGNIFICANT CHANGE UP (ref 27–34)
MCV RBC AUTO: 100.8 FL — HIGH (ref 80–100)
NRBC # FLD: 0 — SIGNIFICANT CHANGE UP
PLATELET # BLD AUTO: 269 K/UL — SIGNIFICANT CHANGE UP (ref 150–400)
PMV BLD: 9.3 FL — SIGNIFICANT CHANGE UP (ref 7–13)
POTASSIUM SERPL-MCNC: 4.4 MMOL/L — SIGNIFICANT CHANGE UP (ref 3.5–5.3)
POTASSIUM SERPL-SCNC: 4.4 MMOL/L — SIGNIFICANT CHANGE UP (ref 3.5–5.3)
PROTHROM AB SERPL-ACNC: 10.8 SEC — SIGNIFICANT CHANGE UP (ref 9.8–13.1)
RBC # BLD: 3.77 M/UL — LOW (ref 3.8–5.2)
RBC # FLD: 14.4 % — SIGNIFICANT CHANGE UP (ref 10.3–14.5)
RH IG SCN BLD-IMP: POSITIVE — SIGNIFICANT CHANGE UP
SODIUM SERPL-SCNC: 139 MMOL/L — SIGNIFICANT CHANGE UP (ref 135–145)
WBC # BLD: 5.56 K/UL — SIGNIFICANT CHANGE UP (ref 3.8–10.5)
WBC # FLD AUTO: 5.56 K/UL — SIGNIFICANT CHANGE UP (ref 3.8–10.5)

## 2018-05-18 PROCEDURE — 99233 SBSQ HOSP IP/OBS HIGH 50: CPT

## 2018-05-18 PROCEDURE — 73552 X-RAY EXAM OF FEMUR 2/>: CPT | Mod: 26,RT

## 2018-05-18 RX ORDER — HEPARIN SODIUM 5000 [USP'U]/ML
5000 INJECTION INTRAVENOUS; SUBCUTANEOUS EVERY 8 HOURS
Qty: 0 | Refills: 0 | Status: COMPLETED | OUTPATIENT
Start: 2018-05-18 | End: 2018-05-20

## 2018-05-18 RX ORDER — QUETIAPINE FUMARATE 200 MG/1
100 TABLET, FILM COATED ORAL AT BEDTIME
Qty: 0 | Refills: 0 | Status: DISCONTINUED | OUTPATIENT
Start: 2018-05-18 | End: 2018-05-24

## 2018-05-18 RX ADMIN — MORPHINE SULFATE 2 MILLIGRAM(S): 50 CAPSULE, EXTENDED RELEASE ORAL at 05:00

## 2018-05-18 RX ADMIN — Medication 1 PATCH: at 11:59

## 2018-05-18 RX ADMIN — HYDROMORPHONE HYDROCHLORIDE 6 MILLIGRAM(S): 2 INJECTION INTRAMUSCULAR; INTRAVENOUS; SUBCUTANEOUS at 08:44

## 2018-05-18 RX ADMIN — Medication 81 MILLIGRAM(S): at 12:00

## 2018-05-18 RX ADMIN — Medication 6 MILLIGRAM(S): at 22:13

## 2018-05-18 RX ADMIN — QUETIAPINE FUMARATE 50 MILLIGRAM(S): 200 TABLET, FILM COATED ORAL at 13:21

## 2018-05-18 RX ADMIN — TIZANIDINE 2 MILLIGRAM(S): 4 TABLET ORAL at 00:48

## 2018-05-18 RX ADMIN — HYDROMORPHONE HYDROCHLORIDE 6 MILLIGRAM(S): 2 INJECTION INTRAMUSCULAR; INTRAVENOUS; SUBCUTANEOUS at 18:10

## 2018-05-18 RX ADMIN — MORPHINE SULFATE 2 MILLIGRAM(S): 50 CAPSULE, EXTENDED RELEASE ORAL at 22:27

## 2018-05-18 RX ADMIN — TIZANIDINE 2 MILLIGRAM(S): 4 TABLET ORAL at 05:19

## 2018-05-18 RX ADMIN — TRAMADOL HYDROCHLORIDE 50 MILLIGRAM(S): 50 TABLET ORAL at 13:21

## 2018-05-18 RX ADMIN — MORPHINE SULFATE 2 MILLIGRAM(S): 50 CAPSULE, EXTENDED RELEASE ORAL at 22:12

## 2018-05-18 RX ADMIN — GABAPENTIN 200 MILLIGRAM(S): 400 CAPSULE ORAL at 22:13

## 2018-05-18 RX ADMIN — HYDROMORPHONE HYDROCHLORIDE 6 MILLIGRAM(S): 2 INJECTION INTRAMUSCULAR; INTRAVENOUS; SUBCUTANEOUS at 09:40

## 2018-05-18 RX ADMIN — HYDROMORPHONE HYDROCHLORIDE 6 MILLIGRAM(S): 2 INJECTION INTRAMUSCULAR; INTRAVENOUS; SUBCUTANEOUS at 00:48

## 2018-05-18 RX ADMIN — HYDROMORPHONE HYDROCHLORIDE 6 MILLIGRAM(S): 2 INJECTION INTRAMUSCULAR; INTRAVENOUS; SUBCUTANEOUS at 17:21

## 2018-05-18 RX ADMIN — MORPHINE SULFATE 2 MILLIGRAM(S): 50 CAPSULE, EXTENDED RELEASE ORAL at 13:59

## 2018-05-18 RX ADMIN — HEPARIN SODIUM 5000 UNIT(S): 5000 INJECTION INTRAVENOUS; SUBCUTANEOUS at 12:00

## 2018-05-18 RX ADMIN — SODIUM CHLORIDE 100 MILLILITER(S): 9 INJECTION INTRAMUSCULAR; INTRAVENOUS; SUBCUTANEOUS at 00:51

## 2018-05-18 RX ADMIN — GABAPENTIN 200 MILLIGRAM(S): 400 CAPSULE ORAL at 05:18

## 2018-05-18 RX ADMIN — Medication 1 PATCH: at 11:57

## 2018-05-18 RX ADMIN — Medication 50 MILLIGRAM(S): at 22:13

## 2018-05-18 RX ADMIN — TIZANIDINE 2 MILLIGRAM(S): 4 TABLET ORAL at 11:59

## 2018-05-18 RX ADMIN — MORPHINE SULFATE 2 MILLIGRAM(S): 50 CAPSULE, EXTENDED RELEASE ORAL at 10:17

## 2018-05-18 RX ADMIN — MORPHINE SULFATE 2 MILLIGRAM(S): 50 CAPSULE, EXTENDED RELEASE ORAL at 14:15

## 2018-05-18 RX ADMIN — MORPHINE SULFATE 2 MILLIGRAM(S): 50 CAPSULE, EXTENDED RELEASE ORAL at 18:45

## 2018-05-18 RX ADMIN — QUETIAPINE FUMARATE 100 MILLIGRAM(S): 200 TABLET, FILM COATED ORAL at 23:14

## 2018-05-18 RX ADMIN — GABAPENTIN 200 MILLIGRAM(S): 400 CAPSULE ORAL at 13:22

## 2018-05-18 RX ADMIN — TRAMADOL HYDROCHLORIDE 50 MILLIGRAM(S): 50 TABLET ORAL at 05:18

## 2018-05-18 RX ADMIN — TIZANIDINE 2 MILLIGRAM(S): 4 TABLET ORAL at 17:21

## 2018-05-18 RX ADMIN — CARVEDILOL PHOSPHATE 12.5 MILLIGRAM(S): 80 CAPSULE, EXTENDED RELEASE ORAL at 17:20

## 2018-05-18 RX ADMIN — MORPHINE SULFATE 2 MILLIGRAM(S): 50 CAPSULE, EXTENDED RELEASE ORAL at 04:06

## 2018-05-18 RX ADMIN — MORPHINE SULFATE 2 MILLIGRAM(S): 50 CAPSULE, EXTENDED RELEASE ORAL at 18:30

## 2018-05-18 RX ADMIN — Medication 1 TABLET(S): at 05:18

## 2018-05-18 RX ADMIN — MORPHINE SULFATE 2 MILLIGRAM(S): 50 CAPSULE, EXTENDED RELEASE ORAL at 10:37

## 2018-05-18 RX ADMIN — HYDROMORPHONE HYDROCHLORIDE 6 MILLIGRAM(S): 2 INJECTION INTRAMUSCULAR; INTRAVENOUS; SUBCUTANEOUS at 01:30

## 2018-05-18 NOTE — PROGRESS NOTE BEHAVIORAL HEALTH - NSBHFUPINTERVALHXFT_PSY_A_CORE
Chart reviewed.  Per staff pt was up all might, requesting pain medication. Pt received Seroquel 50mg at 13:00 . Pt finally sleeping after PRN was given.   Seen and examined at bedside. Pt sleeping, able to arouse by voice. When pt awoke , pt stated that she was "not doing well" and in "pain". Pt then closed her eyes and did not complete rest of interview.

## 2018-05-18 NOTE — PROGRESS NOTE BEHAVIORAL HEALTH - NSBHCONSULTFOLLOWAFTERCARE_PSY_A_CORE FT
[facility] Geriatric outpt. clinic: [phone]  [facility] outpt. walk in clinic : [phone] (9AM-7PM)  [facility] Outpatient clinic: [phone]

## 2018-05-18 NOTE — PROGRESS NOTE BEHAVIORAL HEALTH - SUMMARY
71 yo female, with past psychiatric history of anxiety NOS and benzodiazepine abuse (w/ reported hist of seizures from benzo withdrawal), alcohol abuse (no hist withdrawal), PMH osteonecrosis R femoral neck s/p CRPP, CAD (s/p stents), HTN, HLD, psych consulted for anxiety and insomnia. Patient denies depressed mood, SI/HI. She requests medication for her anxiety and insomnia, suggesting Ativan or Xanax. Pt. reported that Atarax did not help last night. Pt. reported good response to Seroquel in th past.    Seen and examined. Pt sedated during interview, after staying up all night and receiving PRN Seroquel 50mg at 13:00  Per staff pt up all night excessively requesting percocet and Tylenol with codeine 4mg   Plan:  1. Seroquel 100mg qhs to aid sleep  2. PRN Seroquel 50 mg PO q6hrs for anxiety or insomnia  3.. Consider increasing gabapentin dosage as it can decrease anxiety.

## 2018-05-19 PROCEDURE — 99233 SBSQ HOSP IP/OBS HIGH 50: CPT

## 2018-05-19 RX ORDER — LABETALOL HCL 100 MG
200 TABLET ORAL THREE TIMES A DAY
Qty: 0 | Refills: 0 | Status: DISCONTINUED | OUTPATIENT
Start: 2018-05-19 | End: 2018-05-25

## 2018-05-19 RX ADMIN — Medication 200 MILLIGRAM(S): at 22:20

## 2018-05-19 RX ADMIN — MORPHINE SULFATE 2 MILLIGRAM(S): 50 CAPSULE, EXTENDED RELEASE ORAL at 10:32

## 2018-05-19 RX ADMIN — QUETIAPINE FUMARATE 50 MILLIGRAM(S): 200 TABLET, FILM COATED ORAL at 23:29

## 2018-05-19 RX ADMIN — Medication 1 PATCH: at 12:34

## 2018-05-19 RX ADMIN — Medication 1 PATCH: at 13:29

## 2018-05-19 RX ADMIN — QUETIAPINE FUMARATE 100 MILLIGRAM(S): 200 TABLET, FILM COATED ORAL at 20:44

## 2018-05-19 RX ADMIN — MORPHINE SULFATE 2 MILLIGRAM(S): 50 CAPSULE, EXTENDED RELEASE ORAL at 22:50

## 2018-05-19 RX ADMIN — Medication 1 TABLET(S): at 06:30

## 2018-05-19 RX ADMIN — Medication 200 MILLIGRAM(S): at 16:03

## 2018-05-19 RX ADMIN — Medication 50 MILLIGRAM(S): at 06:30

## 2018-05-19 RX ADMIN — HYDROMORPHONE HYDROCHLORIDE 6 MILLIGRAM(S): 2 INJECTION INTRAMUSCULAR; INTRAVENOUS; SUBCUTANEOUS at 01:50

## 2018-05-19 RX ADMIN — MORPHINE SULFATE 2 MILLIGRAM(S): 50 CAPSULE, EXTENDED RELEASE ORAL at 04:58

## 2018-05-19 RX ADMIN — GABAPENTIN 200 MILLIGRAM(S): 400 CAPSULE ORAL at 13:42

## 2018-05-19 RX ADMIN — HYDROMORPHONE HYDROCHLORIDE 6 MILLIGRAM(S): 2 INJECTION INTRAMUSCULAR; INTRAVENOUS; SUBCUTANEOUS at 20:21

## 2018-05-19 RX ADMIN — Medication 90 MILLIGRAM(S): at 06:30

## 2018-05-19 RX ADMIN — Medication 81 MILLIGRAM(S): at 12:33

## 2018-05-19 RX ADMIN — Medication 1 PATCH: at 23:30

## 2018-05-19 RX ADMIN — CARVEDILOL PHOSPHATE 12.5 MILLIGRAM(S): 80 CAPSULE, EXTENDED RELEASE ORAL at 06:30

## 2018-05-19 RX ADMIN — TRAMADOL HYDROCHLORIDE 50 MILLIGRAM(S): 50 TABLET ORAL at 22:20

## 2018-05-19 RX ADMIN — HYDROMORPHONE HYDROCHLORIDE 6 MILLIGRAM(S): 2 INJECTION INTRAMUSCULAR; INTRAVENOUS; SUBCUTANEOUS at 13:56

## 2018-05-19 RX ADMIN — Medication 1 TABLET(S): at 17:26

## 2018-05-19 RX ADMIN — MORPHINE SULFATE 2 MILLIGRAM(S): 50 CAPSULE, EXTENDED RELEASE ORAL at 16:01

## 2018-05-19 RX ADMIN — HYDROMORPHONE HYDROCHLORIDE 6 MILLIGRAM(S): 2 INJECTION INTRAMUSCULAR; INTRAVENOUS; SUBCUTANEOUS at 07:53

## 2018-05-19 RX ADMIN — MORPHINE SULFATE 2 MILLIGRAM(S): 50 CAPSULE, EXTENDED RELEASE ORAL at 10:17

## 2018-05-19 RX ADMIN — MORPHINE SULFATE 2 MILLIGRAM(S): 50 CAPSULE, EXTENDED RELEASE ORAL at 19:13

## 2018-05-19 RX ADMIN — MORPHINE SULFATE 2 MILLIGRAM(S): 50 CAPSULE, EXTENDED RELEASE ORAL at 16:16

## 2018-05-19 RX ADMIN — HYDROMORPHONE HYDROCHLORIDE 6 MILLIGRAM(S): 2 INJECTION INTRAMUSCULAR; INTRAVENOUS; SUBCUTANEOUS at 00:51

## 2018-05-19 RX ADMIN — MORPHINE SULFATE 2 MILLIGRAM(S): 50 CAPSULE, EXTENDED RELEASE ORAL at 19:28

## 2018-05-19 RX ADMIN — HYDROMORPHONE HYDROCHLORIDE 6 MILLIGRAM(S): 2 INJECTION INTRAMUSCULAR; INTRAVENOUS; SUBCUTANEOUS at 08:53

## 2018-05-19 RX ADMIN — TRAMADOL HYDROCHLORIDE 50 MILLIGRAM(S): 50 TABLET ORAL at 13:42

## 2018-05-19 RX ADMIN — MORPHINE SULFATE 2 MILLIGRAM(S): 50 CAPSULE, EXTENDED RELEASE ORAL at 04:43

## 2018-05-19 RX ADMIN — TRAMADOL HYDROCHLORIDE 50 MILLIGRAM(S): 50 TABLET ORAL at 06:30

## 2018-05-19 RX ADMIN — HYDROMORPHONE HYDROCHLORIDE 6 MILLIGRAM(S): 2 INJECTION INTRAMUSCULAR; INTRAVENOUS; SUBCUTANEOUS at 14:56

## 2018-05-19 RX ADMIN — Medication 50 MILLIGRAM(S): at 22:20

## 2018-05-19 RX ADMIN — GABAPENTIN 200 MILLIGRAM(S): 400 CAPSULE ORAL at 06:30

## 2018-05-19 RX ADMIN — GABAPENTIN 200 MILLIGRAM(S): 400 CAPSULE ORAL at 22:20

## 2018-05-19 RX ADMIN — QUETIAPINE FUMARATE 50 MILLIGRAM(S): 200 TABLET, FILM COATED ORAL at 08:57

## 2018-05-19 RX ADMIN — MORPHINE SULFATE 2 MILLIGRAM(S): 50 CAPSULE, EXTENDED RELEASE ORAL at 22:20

## 2018-05-20 ENCOUNTER — TRANSCRIPTION ENCOUNTER (OUTPATIENT)
Age: 73
End: 2018-05-20

## 2018-05-20 LAB
APTT BLD: 26.1 SEC — LOW (ref 27.5–37.4)
BLD GP AB SCN SERPL QL: NEGATIVE — SIGNIFICANT CHANGE UP
BUN SERPL-MCNC: 38 MG/DL — HIGH (ref 7–23)
CALCIUM SERPL-MCNC: 8.9 MG/DL — SIGNIFICANT CHANGE UP (ref 8.4–10.5)
CHLORIDE SERPL-SCNC: 98 MMOL/L — SIGNIFICANT CHANGE UP (ref 98–107)
CO2 SERPL-SCNC: 26 MMOL/L — SIGNIFICANT CHANGE UP (ref 22–31)
CREAT SERPL-MCNC: 0.91 MG/DL — SIGNIFICANT CHANGE UP (ref 0.5–1.3)
GLUCOSE SERPL-MCNC: 95 MG/DL — SIGNIFICANT CHANGE UP (ref 70–99)
HCT VFR BLD CALC: 39.9 % — SIGNIFICANT CHANGE UP (ref 34.5–45)
HGB BLD-MCNC: 12.8 G/DL — SIGNIFICANT CHANGE UP (ref 11.5–15.5)
INR BLD: 0.91 — SIGNIFICANT CHANGE UP (ref 0.88–1.17)
MCHC RBC-ENTMCNC: 32.1 % — SIGNIFICANT CHANGE UP (ref 32–36)
MCHC RBC-ENTMCNC: 32.8 PG — SIGNIFICANT CHANGE UP (ref 27–34)
MCV RBC AUTO: 102.3 FL — HIGH (ref 80–100)
NRBC # FLD: 0 — SIGNIFICANT CHANGE UP
PLATELET # BLD AUTO: 227 K/UL — SIGNIFICANT CHANGE UP (ref 150–400)
PMV BLD: 9.2 FL — SIGNIFICANT CHANGE UP (ref 7–13)
POTASSIUM SERPL-MCNC: 4.4 MMOL/L — SIGNIFICANT CHANGE UP (ref 3.5–5.3)
POTASSIUM SERPL-SCNC: 4.4 MMOL/L — SIGNIFICANT CHANGE UP (ref 3.5–5.3)
PROTHROM AB SERPL-ACNC: 10.5 SEC — SIGNIFICANT CHANGE UP (ref 9.8–13.1)
RBC # BLD: 3.9 M/UL — SIGNIFICANT CHANGE UP (ref 3.8–5.2)
RBC # FLD: 14.3 % — SIGNIFICANT CHANGE UP (ref 10.3–14.5)
RH IG SCN BLD-IMP: POSITIVE — SIGNIFICANT CHANGE UP
SODIUM SERPL-SCNC: 137 MMOL/L — SIGNIFICANT CHANGE UP (ref 135–145)
WBC # BLD: 6.54 K/UL — SIGNIFICANT CHANGE UP (ref 3.8–10.5)
WBC # FLD AUTO: 6.54 K/UL — SIGNIFICANT CHANGE UP (ref 3.8–10.5)

## 2018-05-20 PROCEDURE — 99233 SBSQ HOSP IP/OBS HIGH 50: CPT

## 2018-05-20 RX ORDER — SODIUM CHLORIDE 9 MG/ML
1000 INJECTION INTRAMUSCULAR; INTRAVENOUS; SUBCUTANEOUS
Qty: 0 | Refills: 0 | Status: DISCONTINUED | OUTPATIENT
Start: 2018-05-20 | End: 2018-05-22

## 2018-05-20 RX ADMIN — GABAPENTIN 200 MILLIGRAM(S): 400 CAPSULE ORAL at 05:41

## 2018-05-20 RX ADMIN — MORPHINE SULFATE 2 MILLIGRAM(S): 50 CAPSULE, EXTENDED RELEASE ORAL at 16:55

## 2018-05-20 RX ADMIN — GABAPENTIN 200 MILLIGRAM(S): 400 CAPSULE ORAL at 22:24

## 2018-05-20 RX ADMIN — Medication 50 MILLIGRAM(S): at 13:36

## 2018-05-20 RX ADMIN — MORPHINE SULFATE 2 MILLIGRAM(S): 50 CAPSULE, EXTENDED RELEASE ORAL at 23:34

## 2018-05-20 RX ADMIN — MORPHINE SULFATE 2 MILLIGRAM(S): 50 CAPSULE, EXTENDED RELEASE ORAL at 13:36

## 2018-05-20 RX ADMIN — TRAMADOL HYDROCHLORIDE 50 MILLIGRAM(S): 50 TABLET ORAL at 05:41

## 2018-05-20 RX ADMIN — TRAMADOL HYDROCHLORIDE 50 MILLIGRAM(S): 50 TABLET ORAL at 22:26

## 2018-05-20 RX ADMIN — Medication 90 MILLIGRAM(S): at 11:16

## 2018-05-20 RX ADMIN — MORPHINE SULFATE 2 MILLIGRAM(S): 50 CAPSULE, EXTENDED RELEASE ORAL at 01:55

## 2018-05-20 RX ADMIN — MORPHINE SULFATE 2 MILLIGRAM(S): 50 CAPSULE, EXTENDED RELEASE ORAL at 09:17

## 2018-05-20 RX ADMIN — Medication 50 MILLIGRAM(S): at 22:24

## 2018-05-20 RX ADMIN — MORPHINE SULFATE 2 MILLIGRAM(S): 50 CAPSULE, EXTENDED RELEASE ORAL at 01:28

## 2018-05-20 RX ADMIN — Medication 1 TABLET(S): at 05:41

## 2018-05-20 RX ADMIN — Medication 81 MILLIGRAM(S): at 11:16

## 2018-05-20 RX ADMIN — Medication 200 MILLIGRAM(S): at 05:41

## 2018-05-20 RX ADMIN — HYDROMORPHONE HYDROCHLORIDE 6 MILLIGRAM(S): 2 INJECTION INTRAMUSCULAR; INTRAVENOUS; SUBCUTANEOUS at 03:52

## 2018-05-20 RX ADMIN — MORPHINE SULFATE 2 MILLIGRAM(S): 50 CAPSULE, EXTENDED RELEASE ORAL at 05:05

## 2018-05-20 RX ADMIN — MORPHINE SULFATE 2 MILLIGRAM(S): 50 CAPSULE, EXTENDED RELEASE ORAL at 20:52

## 2018-05-20 RX ADMIN — MORPHINE SULFATE 2 MILLIGRAM(S): 50 CAPSULE, EXTENDED RELEASE ORAL at 13:51

## 2018-05-20 RX ADMIN — MORPHINE SULFATE 2 MILLIGRAM(S): 50 CAPSULE, EXTENDED RELEASE ORAL at 09:32

## 2018-05-20 RX ADMIN — MORPHINE SULFATE 2 MILLIGRAM(S): 50 CAPSULE, EXTENDED RELEASE ORAL at 04:35

## 2018-05-20 RX ADMIN — MORPHINE SULFATE 2 MILLIGRAM(S): 50 CAPSULE, EXTENDED RELEASE ORAL at 20:22

## 2018-05-20 RX ADMIN — Medication 1 TABLET(S): at 18:53

## 2018-05-20 RX ADMIN — HEPARIN SODIUM 5000 UNIT(S): 5000 INJECTION INTRAVENOUS; SUBCUTANEOUS at 13:36

## 2018-05-20 RX ADMIN — GABAPENTIN 200 MILLIGRAM(S): 400 CAPSULE ORAL at 13:36

## 2018-05-20 RX ADMIN — Medication 50 MILLIGRAM(S): at 05:41

## 2018-05-20 RX ADMIN — QUETIAPINE FUMARATE 100 MILLIGRAM(S): 200 TABLET, FILM COATED ORAL at 22:24

## 2018-05-20 RX ADMIN — HYDROMORPHONE HYDROCHLORIDE 6 MILLIGRAM(S): 2 INJECTION INTRAMUSCULAR; INTRAVENOUS; SUBCUTANEOUS at 03:22

## 2018-05-20 RX ADMIN — HEPARIN SODIUM 5000 UNIT(S): 5000 INJECTION INTRAVENOUS; SUBCUTANEOUS at 22:25

## 2018-05-20 RX ADMIN — HYDROMORPHONE HYDROCHLORIDE 6 MILLIGRAM(S): 2 INJECTION INTRAMUSCULAR; INTRAVENOUS; SUBCUTANEOUS at 18:53

## 2018-05-20 RX ADMIN — Medication 200 MILLIGRAM(S): at 22:25

## 2018-05-20 RX ADMIN — HYDROMORPHONE HYDROCHLORIDE 6 MILLIGRAM(S): 2 INJECTION INTRAMUSCULAR; INTRAVENOUS; SUBCUTANEOUS at 12:00

## 2018-05-20 RX ADMIN — Medication 200 MILLIGRAM(S): at 13:36

## 2018-05-20 RX ADMIN — QUETIAPINE FUMARATE 50 MILLIGRAM(S): 200 TABLET, FILM COATED ORAL at 11:16

## 2018-05-20 RX ADMIN — HYDROMORPHONE HYDROCHLORIDE 6 MILLIGRAM(S): 2 INJECTION INTRAMUSCULAR; INTRAVENOUS; SUBCUTANEOUS at 19:51

## 2018-05-20 RX ADMIN — TRAMADOL HYDROCHLORIDE 50 MILLIGRAM(S): 50 TABLET ORAL at 22:25

## 2018-05-20 RX ADMIN — MORPHINE SULFATE 2 MILLIGRAM(S): 50 CAPSULE, EXTENDED RELEASE ORAL at 23:44

## 2018-05-20 RX ADMIN — MORPHINE SULFATE 2 MILLIGRAM(S): 50 CAPSULE, EXTENDED RELEASE ORAL at 16:40

## 2018-05-20 RX ADMIN — HYDROMORPHONE HYDROCHLORIDE 6 MILLIGRAM(S): 2 INJECTION INTRAMUSCULAR; INTRAVENOUS; SUBCUTANEOUS at 11:16

## 2018-05-20 NOTE — PROVIDER CONTACT NOTE (OTHER) - ASSESSMENT
A&Ox4. VSS afebrile. Patient's spouse was admitted to [facility] overnight and patient is requesting to leave floor to see spouse

## 2018-05-20 NOTE — PROVIDER CONTACT NOTE (OTHER) - BACKGROUND
admitted pre-operatively, patient scheduled for the OR for YARI, possible cement spacer placement and possible USAMA

## 2018-05-21 ENCOUNTER — APPOINTMENT (OUTPATIENT)
Dept: ORTHOPEDIC SURGERY | Facility: HOSPITAL | Age: 73
End: 2018-05-21

## 2018-05-21 ENCOUNTER — RESULT REVIEW (OUTPATIENT)
Age: 73
End: 2018-05-21

## 2018-05-21 DIAGNOSIS — G47.00 INSOMNIA, UNSPECIFIED: ICD-10-CM

## 2018-05-21 LAB
ALBUMIN SERPL ELPH-MCNC: 3.9 G/DL — SIGNIFICANT CHANGE UP (ref 3.3–5)
ALP SERPL-CCNC: 64 U/L — SIGNIFICANT CHANGE UP (ref 40–120)
ALT FLD-CCNC: 11 U/L — SIGNIFICANT CHANGE UP (ref 4–33)
APTT BLD: 30.9 SEC — SIGNIFICANT CHANGE UP (ref 27.5–37.4)
AST SERPL-CCNC: 12 U/L — SIGNIFICANT CHANGE UP (ref 4–32)
BILIRUB SERPL-MCNC: 0.3 MG/DL — SIGNIFICANT CHANGE UP (ref 0.2–1.2)
BUN SERPL-MCNC: 26 MG/DL — HIGH (ref 7–23)
CALCIUM SERPL-MCNC: 9 MG/DL — SIGNIFICANT CHANGE UP (ref 8.4–10.5)
CHLORIDE SERPL-SCNC: 101 MMOL/L — SIGNIFICANT CHANGE UP (ref 98–107)
CO2 SERPL-SCNC: 29 MMOL/L — SIGNIFICANT CHANGE UP (ref 22–31)
CREAT SERPL-MCNC: 0.71 MG/DL — SIGNIFICANT CHANGE UP (ref 0.5–1.3)
GLUCOSE SERPL-MCNC: 87 MG/DL — SIGNIFICANT CHANGE UP (ref 70–99)
GRAM STN WND: SIGNIFICANT CHANGE UP
GRAM STN WND: SIGNIFICANT CHANGE UP
HCT VFR BLD CALC: 37.3 % — SIGNIFICANT CHANGE UP (ref 34.5–45)
HGB BLD-MCNC: 11.9 G/DL — SIGNIFICANT CHANGE UP (ref 11.5–15.5)
INR BLD: 0.96 — SIGNIFICANT CHANGE UP (ref 0.88–1.17)
MCHC RBC-ENTMCNC: 31.9 % — LOW (ref 32–36)
MCHC RBC-ENTMCNC: 32.6 PG — SIGNIFICANT CHANGE UP (ref 27–34)
MCV RBC AUTO: 102.2 FL — HIGH (ref 80–100)
NRBC # FLD: 0 — SIGNIFICANT CHANGE UP
PLATELET # BLD AUTO: 230 K/UL — SIGNIFICANT CHANGE UP (ref 150–400)
PMV BLD: 9.2 FL — SIGNIFICANT CHANGE UP (ref 7–13)
POTASSIUM SERPL-MCNC: 5.2 MMOL/L — SIGNIFICANT CHANGE UP (ref 3.5–5.3)
POTASSIUM SERPL-SCNC: 5.2 MMOL/L — SIGNIFICANT CHANGE UP (ref 3.5–5.3)
PROT SERPL-MCNC: 7.1 G/DL — SIGNIFICANT CHANGE UP (ref 6–8.3)
PROTHROM AB SERPL-ACNC: 11 SEC — SIGNIFICANT CHANGE UP (ref 9.8–13.1)
RBC # BLD: 3.65 M/UL — LOW (ref 3.8–5.2)
RBC # FLD: 14.3 % — SIGNIFICANT CHANGE UP (ref 10.3–14.5)
SODIUM SERPL-SCNC: 141 MMOL/L — SIGNIFICANT CHANGE UP (ref 135–145)
SPECIMEN SOURCE: SIGNIFICANT CHANGE UP
SPECIMEN SOURCE: SIGNIFICANT CHANGE UP
WBC # BLD: 6.19 K/UL — SIGNIFICANT CHANGE UP (ref 3.8–10.5)
WBC # FLD AUTO: 6.19 K/UL — SIGNIFICANT CHANGE UP (ref 3.8–10.5)

## 2018-05-21 PROCEDURE — 72170 X-RAY EXAM OF PELVIS: CPT | Mod: 26

## 2018-05-21 PROCEDURE — 88311 DECALCIFY TISSUE: CPT | Mod: 26

## 2018-05-21 PROCEDURE — 88304 TISSUE EXAM BY PATHOLOGIST: CPT | Mod: 26

## 2018-05-21 PROCEDURE — 99233 SBSQ HOSP IP/OBS HIGH 50: CPT

## 2018-05-21 PROCEDURE — 88300 SURGICAL PATH GROSS: CPT | Mod: 26

## 2018-05-21 PROCEDURE — 27132 TOTAL HIP ARTHROPLASTY: CPT | Mod: RT

## 2018-05-21 RX ORDER — MAGNESIUM HYDROXIDE 400 MG/1
30 TABLET, CHEWABLE ORAL
Qty: 0 | Refills: 0 | Status: DISCONTINUED | OUTPATIENT
Start: 2018-05-21 | End: 2018-05-25

## 2018-05-21 RX ORDER — NALOXONE HYDROCHLORIDE 4 MG/.1ML
0.1 SPRAY NASAL
Qty: 0 | Refills: 0 | Status: DISCONTINUED | OUTPATIENT
Start: 2018-05-21 | End: 2018-05-23

## 2018-05-21 RX ORDER — SODIUM CHLORIDE 9 MG/ML
1000 INJECTION INTRAMUSCULAR; INTRAVENOUS; SUBCUTANEOUS ONCE
Qty: 0 | Refills: 0 | Status: COMPLETED | OUTPATIENT
Start: 2018-05-22 | End: 2018-05-22

## 2018-05-21 RX ORDER — ACETAMINOPHEN 500 MG
650 TABLET ORAL EVERY 8 HOURS
Qty: 0 | Refills: 0 | Status: COMPLETED | OUTPATIENT
Start: 2018-05-21 | End: 2018-05-23

## 2018-05-21 RX ORDER — KETOROLAC TROMETHAMINE 30 MG/ML
15 SYRINGE (ML) INJECTION ONCE
Qty: 0 | Refills: 0 | Status: DISCONTINUED | OUTPATIENT
Start: 2018-05-21 | End: 2018-05-21

## 2018-05-21 RX ORDER — HYDROMORPHONE HYDROCHLORIDE 2 MG/ML
1 INJECTION INTRAMUSCULAR; INTRAVENOUS; SUBCUTANEOUS ONCE
Qty: 0 | Refills: 0 | Status: DISCONTINUED | OUTPATIENT
Start: 2018-05-21 | End: 2018-05-21

## 2018-05-21 RX ORDER — DEXAMETHASONE 0.5 MG/5ML
10 ELIXIR ORAL ONCE
Qty: 0 | Refills: 0 | Status: COMPLETED | OUTPATIENT
Start: 2018-05-22 | End: 2018-05-22

## 2018-05-21 RX ORDER — SENNA PLUS 8.6 MG/1
2 TABLET ORAL AT BEDTIME
Qty: 0 | Refills: 0 | Status: DISCONTINUED | OUTPATIENT
Start: 2018-05-21 | End: 2018-05-25

## 2018-05-21 RX ORDER — KETOROLAC TROMETHAMINE 30 MG/ML
15 SYRINGE (ML) INJECTION EVERY 6 HOURS
Qty: 0 | Refills: 0 | Status: DISCONTINUED | OUTPATIENT
Start: 2018-05-22 | End: 2018-05-22

## 2018-05-21 RX ORDER — TRAMADOL HYDROCHLORIDE 50 MG/1
50 TABLET ORAL EVERY 8 HOURS
Qty: 0 | Refills: 0 | Status: DISCONTINUED | OUTPATIENT
Start: 2018-05-21 | End: 2018-05-22

## 2018-05-21 RX ORDER — ASPIRIN/CALCIUM CARB/MAGNESIUM 324 MG
325 TABLET ORAL
Qty: 0 | Refills: 0 | Status: DISCONTINUED | OUTPATIENT
Start: 2018-05-21 | End: 2018-05-25

## 2018-05-21 RX ORDER — ONDANSETRON 8 MG/1
4 TABLET, FILM COATED ORAL EVERY 6 HOURS
Qty: 0 | Refills: 0 | Status: DISCONTINUED | OUTPATIENT
Start: 2018-05-21 | End: 2018-05-23

## 2018-05-21 RX ORDER — ONDANSETRON 8 MG/1
4 TABLET, FILM COATED ORAL EVERY 6 HOURS
Qty: 0 | Refills: 0 | Status: DISCONTINUED | OUTPATIENT
Start: 2018-05-21 | End: 2018-05-25

## 2018-05-21 RX ORDER — DOCUSATE SODIUM 100 MG
100 CAPSULE ORAL THREE TIMES A DAY
Qty: 0 | Refills: 0 | Status: DISCONTINUED | OUTPATIENT
Start: 2018-05-21 | End: 2018-05-25

## 2018-05-21 RX ORDER — CELECOXIB 200 MG/1
200 CAPSULE ORAL ONCE
Qty: 0 | Refills: 0 | Status: COMPLETED | OUTPATIENT
Start: 2018-05-21 | End: 2018-05-22

## 2018-05-21 RX ORDER — DIPHENHYDRAMINE HCL 50 MG
12.5 CAPSULE ORAL EVERY 4 HOURS
Qty: 0 | Refills: 0 | Status: DISCONTINUED | OUTPATIENT
Start: 2018-05-21 | End: 2018-05-23

## 2018-05-21 RX ORDER — POLYETHYLENE GLYCOL 3350 17 G/17G
17 POWDER, FOR SOLUTION ORAL DAILY
Qty: 0 | Refills: 0 | Status: DISCONTINUED | OUTPATIENT
Start: 2018-05-21 | End: 2018-05-25

## 2018-05-21 RX ORDER — MEPERIDINE HYDROCHLORIDE 50 MG/ML
12.5 INJECTION INTRAMUSCULAR; INTRAVENOUS; SUBCUTANEOUS
Qty: 0 | Refills: 0 | Status: DISCONTINUED | OUTPATIENT
Start: 2018-05-21 | End: 2018-05-22

## 2018-05-21 RX ORDER — HYDROMORPHONE HYDROCHLORIDE 2 MG/ML
1 INJECTION INTRAMUSCULAR; INTRAVENOUS; SUBCUTANEOUS
Qty: 0 | Refills: 0 | Status: DISCONTINUED | OUTPATIENT
Start: 2018-05-21 | End: 2018-05-22

## 2018-05-21 RX ORDER — HYDROMORPHONE HYDROCHLORIDE 2 MG/ML
0.5 INJECTION INTRAMUSCULAR; INTRAVENOUS; SUBCUTANEOUS
Qty: 0 | Refills: 0 | Status: DISCONTINUED | OUTPATIENT
Start: 2018-05-21 | End: 2018-05-23

## 2018-05-21 RX ORDER — PANTOPRAZOLE SODIUM 20 MG/1
40 TABLET, DELAYED RELEASE ORAL DAILY
Qty: 0 | Refills: 0 | Status: DISCONTINUED | OUTPATIENT
Start: 2018-05-21 | End: 2018-05-25

## 2018-05-21 RX ORDER — SODIUM CHLORIDE 9 MG/ML
1000 INJECTION, SOLUTION INTRAVENOUS
Qty: 0 | Refills: 0 | Status: DISCONTINUED | OUTPATIENT
Start: 2018-05-21 | End: 2018-05-22

## 2018-05-21 RX ORDER — HYDROMORPHONE HYDROCHLORIDE 2 MG/ML
2 INJECTION INTRAMUSCULAR; INTRAVENOUS; SUBCUTANEOUS
Qty: 0 | Refills: 0 | Status: DISCONTINUED | OUTPATIENT
Start: 2018-05-21 | End: 2018-05-22

## 2018-05-21 RX ORDER — ACETAMINOPHEN 500 MG
650 TABLET ORAL EVERY 6 HOURS
Qty: 0 | Refills: 0 | Status: DISCONTINUED | OUTPATIENT
Start: 2018-05-21 | End: 2018-05-25

## 2018-05-21 RX ORDER — CEFAZOLIN SODIUM 1 G
2000 VIAL (EA) INJECTION EVERY 8 HOURS
Qty: 0 | Refills: 0 | Status: COMPLETED | OUTPATIENT
Start: 2018-05-21 | End: 2018-05-22

## 2018-05-21 RX ORDER — SODIUM CHLORIDE 9 MG/ML
1000 INJECTION INTRAMUSCULAR; INTRAVENOUS; SUBCUTANEOUS ONCE
Qty: 0 | Refills: 0 | Status: COMPLETED | OUTPATIENT
Start: 2018-05-21 | End: 2018-05-21

## 2018-05-21 RX ORDER — HYDROMORPHONE HYDROCHLORIDE 2 MG/ML
30 INJECTION INTRAMUSCULAR; INTRAVENOUS; SUBCUTANEOUS
Qty: 0 | Refills: 0 | Status: DISCONTINUED | OUTPATIENT
Start: 2018-05-21 | End: 2018-05-22

## 2018-05-21 RX ORDER — ACETAMINOPHEN 500 MG
1000 TABLET ORAL ONCE
Qty: 0 | Refills: 0 | Status: COMPLETED | OUTPATIENT
Start: 2018-05-21 | End: 2018-05-22

## 2018-05-21 RX ADMIN — Medication 100 MILLIGRAM(S): at 23:42

## 2018-05-21 RX ADMIN — HYDROMORPHONE HYDROCHLORIDE 1 MILLIGRAM(S): 2 INJECTION INTRAMUSCULAR; INTRAVENOUS; SUBCUTANEOUS at 20:05

## 2018-05-21 RX ADMIN — HYDROMORPHONE HYDROCHLORIDE 1 MILLIGRAM(S): 2 INJECTION INTRAMUSCULAR; INTRAVENOUS; SUBCUTANEOUS at 19:32

## 2018-05-21 RX ADMIN — Medication 200 MILLIGRAM(S): at 23:42

## 2018-05-21 RX ADMIN — QUETIAPINE FUMARATE 100 MILLIGRAM(S): 200 TABLET, FILM COATED ORAL at 23:41

## 2018-05-21 RX ADMIN — GABAPENTIN 200 MILLIGRAM(S): 400 CAPSULE ORAL at 23:41

## 2018-05-21 RX ADMIN — HYDROMORPHONE HYDROCHLORIDE 1 MILLIGRAM(S): 2 INJECTION INTRAMUSCULAR; INTRAVENOUS; SUBCUTANEOUS at 20:45

## 2018-05-21 RX ADMIN — HYDROMORPHONE HYDROCHLORIDE 6 MILLIGRAM(S): 2 INJECTION INTRAMUSCULAR; INTRAVENOUS; SUBCUTANEOUS at 03:41

## 2018-05-21 RX ADMIN — Medication 6 MILLIGRAM(S): at 23:42

## 2018-05-21 RX ADMIN — HYDROMORPHONE HYDROCHLORIDE 1 MILLIGRAM(S): 2 INJECTION INTRAMUSCULAR; INTRAVENOUS; SUBCUTANEOUS at 14:51

## 2018-05-21 RX ADMIN — MORPHINE SULFATE 2 MILLIGRAM(S): 50 CAPSULE, EXTENDED RELEASE ORAL at 08:00

## 2018-05-21 RX ADMIN — MORPHINE SULFATE 2 MILLIGRAM(S): 50 CAPSULE, EXTENDED RELEASE ORAL at 04:24

## 2018-05-21 RX ADMIN — HYDROMORPHONE HYDROCHLORIDE 6 MILLIGRAM(S): 2 INJECTION INTRAMUSCULAR; INTRAVENOUS; SUBCUTANEOUS at 09:30

## 2018-05-21 RX ADMIN — HYDROMORPHONE HYDROCHLORIDE 1 MILLIGRAM(S): 2 INJECTION INTRAMUSCULAR; INTRAVENOUS; SUBCUTANEOUS at 19:55

## 2018-05-21 RX ADMIN — MORPHINE SULFATE 2 MILLIGRAM(S): 50 CAPSULE, EXTENDED RELEASE ORAL at 07:31

## 2018-05-21 RX ADMIN — TRAMADOL HYDROCHLORIDE 50 MILLIGRAM(S): 50 TABLET ORAL at 23:41

## 2018-05-21 RX ADMIN — MORPHINE SULFATE 2 MILLIGRAM(S): 50 CAPSULE, EXTENDED RELEASE ORAL at 11:21

## 2018-05-21 RX ADMIN — Medication 75 MILLIGRAM(S): at 23:41

## 2018-05-21 RX ADMIN — HYDROMORPHONE HYDROCHLORIDE 6 MILLIGRAM(S): 2 INJECTION INTRAMUSCULAR; INTRAVENOUS; SUBCUTANEOUS at 03:11

## 2018-05-21 RX ADMIN — HYDROMORPHONE HYDROCHLORIDE 30 MILLILITER(S): 2 INJECTION INTRAMUSCULAR; INTRAVENOUS; SUBCUTANEOUS at 20:50

## 2018-05-21 RX ADMIN — SODIUM CHLORIDE 1000 MILLILITER(S): 9 INJECTION INTRAMUSCULAR; INTRAVENOUS; SUBCUTANEOUS at 22:45

## 2018-05-21 RX ADMIN — Medication 100 MILLIGRAM(S): at 23:41

## 2018-05-21 RX ADMIN — MORPHINE SULFATE 2 MILLIGRAM(S): 50 CAPSULE, EXTENDED RELEASE ORAL at 04:16

## 2018-05-21 RX ADMIN — HYDROMORPHONE HYDROCHLORIDE 6 MILLIGRAM(S): 2 INJECTION INTRAMUSCULAR; INTRAVENOUS; SUBCUTANEOUS at 09:12

## 2018-05-21 NOTE — BRIEF OPERATIVE NOTE - PRE-OP DX
Avascular necrosis of bone of hip, right  05/21/2018    Active  Deni Lakhani  Fixation hardware in leg  05/21/2018    Active  Deni Lakhani

## 2018-05-21 NOTE — BRIEF OPERATIVE NOTE - PROCEDURE
<<-----Click on this checkbox to enter Procedure Total hip arthroplasty  05/21/2018    Active  OLIVER  Removal of hardware  05/21/2018    Active  OLIVER

## 2018-05-22 LAB
APTT BLD: 30.3 SEC — SIGNIFICANT CHANGE UP (ref 27.5–37.4)
BLD GP AB SCN SERPL QL: NEGATIVE — SIGNIFICANT CHANGE UP
BUN SERPL-MCNC: 25 MG/DL — HIGH (ref 7–23)
BUN SERPL-MCNC: 27 MG/DL — HIGH (ref 7–23)
CALCIUM SERPL-MCNC: 8.1 MG/DL — LOW (ref 8.4–10.5)
CALCIUM SERPL-MCNC: 8.1 MG/DL — LOW (ref 8.4–10.5)
CHLORIDE SERPL-SCNC: 97 MMOL/L — LOW (ref 98–107)
CHLORIDE SERPL-SCNC: 99 MMOL/L — SIGNIFICANT CHANGE UP (ref 98–107)
CO2 SERPL-SCNC: 22 MMOL/L — SIGNIFICANT CHANGE UP (ref 22–31)
CO2 SERPL-SCNC: 23 MMOL/L — SIGNIFICANT CHANGE UP (ref 22–31)
CREAT SERPL-MCNC: 0.73 MG/DL — SIGNIFICANT CHANGE UP (ref 0.5–1.3)
CREAT SERPL-MCNC: 0.76 MG/DL — SIGNIFICANT CHANGE UP (ref 0.5–1.3)
GLUCOSE SERPL-MCNC: 162 MG/DL — HIGH (ref 70–99)
GLUCOSE SERPL-MCNC: 166 MG/DL — HIGH (ref 70–99)
HCT VFR BLD CALC: 33.7 % — LOW (ref 34.5–45)
HCT VFR BLD CALC: 34.8 % — SIGNIFICANT CHANGE UP (ref 34.5–45)
HGB BLD-MCNC: 10.7 G/DL — LOW (ref 11.5–15.5)
HGB BLD-MCNC: 11 G/DL — LOW (ref 11.5–15.5)
INR BLD: 1.04 — SIGNIFICANT CHANGE UP (ref 0.88–1.17)
MCHC RBC-ENTMCNC: 31.6 % — LOW (ref 32–36)
MCHC RBC-ENTMCNC: 31.8 % — LOW (ref 32–36)
MCHC RBC-ENTMCNC: 32.1 PG — SIGNIFICANT CHANGE UP (ref 27–34)
MCHC RBC-ENTMCNC: 32.6 PG — SIGNIFICANT CHANGE UP (ref 27–34)
MCV RBC AUTO: 101.5 FL — HIGH (ref 80–100)
MCV RBC AUTO: 102.7 FL — HIGH (ref 80–100)
NRBC # FLD: 0 — SIGNIFICANT CHANGE UP
NRBC # FLD: 0 — SIGNIFICANT CHANGE UP
PLATELET # BLD AUTO: 189 K/UL — SIGNIFICANT CHANGE UP (ref 150–400)
PLATELET # BLD AUTO: 212 K/UL — SIGNIFICANT CHANGE UP (ref 150–400)
PMV BLD: 10 FL — SIGNIFICANT CHANGE UP (ref 7–13)
PMV BLD: 9.7 FL — SIGNIFICANT CHANGE UP (ref 7–13)
POTASSIUM SERPL-MCNC: 5 MMOL/L — SIGNIFICANT CHANGE UP (ref 3.5–5.3)
POTASSIUM SERPL-MCNC: 5 MMOL/L — SIGNIFICANT CHANGE UP (ref 3.5–5.3)
POTASSIUM SERPL-SCNC: 5 MMOL/L — SIGNIFICANT CHANGE UP (ref 3.5–5.3)
POTASSIUM SERPL-SCNC: 5 MMOL/L — SIGNIFICANT CHANGE UP (ref 3.5–5.3)
PROTHROM AB SERPL-ACNC: 11.6 SEC — SIGNIFICANT CHANGE UP (ref 9.8–13.1)
RBC # BLD: 3.28 M/UL — LOW (ref 3.8–5.2)
RBC # BLD: 3.43 M/UL — LOW (ref 3.8–5.2)
RBC # FLD: 13.9 % — SIGNIFICANT CHANGE UP (ref 10.3–14.5)
RBC # FLD: 14 % — SIGNIFICANT CHANGE UP (ref 10.3–14.5)
RH IG SCN BLD-IMP: POSITIVE — SIGNIFICANT CHANGE UP
SODIUM SERPL-SCNC: 136 MMOL/L — SIGNIFICANT CHANGE UP (ref 135–145)
SODIUM SERPL-SCNC: 138 MMOL/L — SIGNIFICANT CHANGE UP (ref 135–145)
WBC # BLD: 10.71 K/UL — HIGH (ref 3.8–10.5)
WBC # BLD: 10.91 K/UL — HIGH (ref 3.8–10.5)
WBC # FLD AUTO: 10.71 K/UL — HIGH (ref 3.8–10.5)
WBC # FLD AUTO: 10.91 K/UL — HIGH (ref 3.8–10.5)

## 2018-05-22 PROCEDURE — 99233 SBSQ HOSP IP/OBS HIGH 50: CPT

## 2018-05-22 RX ORDER — KETOROLAC TROMETHAMINE 30 MG/ML
15 SYRINGE (ML) INJECTION EVERY 6 HOURS
Qty: 0 | Refills: 0 | Status: DISCONTINUED | OUTPATIENT
Start: 2018-05-22 | End: 2018-05-23

## 2018-05-22 RX ORDER — HYDROMORPHONE HYDROCHLORIDE 2 MG/ML
30 INJECTION INTRAMUSCULAR; INTRAVENOUS; SUBCUTANEOUS
Qty: 0 | Refills: 0 | Status: DISCONTINUED | OUTPATIENT
Start: 2018-05-22 | End: 2018-05-23

## 2018-05-22 RX ORDER — SODIUM CHLORIDE 9 MG/ML
1000 INJECTION, SOLUTION INTRAVENOUS
Qty: 0 | Refills: 0 | Status: DISCONTINUED | OUTPATIENT
Start: 2018-05-22 | End: 2018-05-25

## 2018-05-22 RX ADMIN — Medication 15 MILLIGRAM(S): at 13:53

## 2018-05-22 RX ADMIN — Medication 90 MILLIGRAM(S): at 06:24

## 2018-05-22 RX ADMIN — Medication 200 MILLIGRAM(S): at 21:53

## 2018-05-22 RX ADMIN — POLYETHYLENE GLYCOL 3350 17 GRAM(S): 17 POWDER, FOR SOLUTION ORAL at 13:53

## 2018-05-22 RX ADMIN — Medication 650 MILLIGRAM(S): at 21:59

## 2018-05-22 RX ADMIN — Medication 15 MILLIGRAM(S): at 18:16

## 2018-05-22 RX ADMIN — Medication 325 MILLIGRAM(S): at 06:25

## 2018-05-22 RX ADMIN — Medication 200 MILLIGRAM(S): at 13:52

## 2018-05-22 RX ADMIN — GABAPENTIN 200 MILLIGRAM(S): 400 CAPSULE ORAL at 21:53

## 2018-05-22 RX ADMIN — Medication 100 MILLIGRAM(S): at 06:26

## 2018-05-22 RX ADMIN — HYDROMORPHONE HYDROCHLORIDE 30 MILLILITER(S): 2 INJECTION INTRAMUSCULAR; INTRAVENOUS; SUBCUTANEOUS at 08:27

## 2018-05-22 RX ADMIN — GABAPENTIN 200 MILLIGRAM(S): 400 CAPSULE ORAL at 06:25

## 2018-05-22 RX ADMIN — HYDROMORPHONE HYDROCHLORIDE 30 MILLILITER(S): 2 INJECTION INTRAMUSCULAR; INTRAVENOUS; SUBCUTANEOUS at 20:29

## 2018-05-22 RX ADMIN — Medication 75 MILLIGRAM(S): at 06:26

## 2018-05-22 RX ADMIN — GABAPENTIN 200 MILLIGRAM(S): 400 CAPSULE ORAL at 13:52

## 2018-05-22 RX ADMIN — PANTOPRAZOLE SODIUM 40 MILLIGRAM(S): 20 TABLET, DELAYED RELEASE ORAL at 13:53

## 2018-05-22 RX ADMIN — TIZANIDINE 2 MILLIGRAM(S): 4 TABLET ORAL at 13:52

## 2018-05-22 RX ADMIN — Medication 50 MILLIGRAM(S): at 06:24

## 2018-05-22 RX ADMIN — Medication 50 MILLIGRAM(S): at 21:53

## 2018-05-22 RX ADMIN — Medication 400 MILLIGRAM(S): at 00:20

## 2018-05-22 RX ADMIN — QUETIAPINE FUMARATE 50 MILLIGRAM(S): 200 TABLET, FILM COATED ORAL at 13:52

## 2018-05-22 RX ADMIN — Medication 200 MILLIGRAM(S): at 06:25

## 2018-05-22 RX ADMIN — QUETIAPINE FUMARATE 100 MILLIGRAM(S): 200 TABLET, FILM COATED ORAL at 22:04

## 2018-05-22 RX ADMIN — TRAMADOL HYDROCHLORIDE 50 MILLIGRAM(S): 50 TABLET ORAL at 06:26

## 2018-05-22 RX ADMIN — Medication 50 MILLIGRAM(S): at 13:52

## 2018-05-22 RX ADMIN — SODIUM CHLORIDE 1000 MILLILITER(S): 9 INJECTION INTRAMUSCULAR; INTRAVENOUS; SUBCUTANEOUS at 07:53

## 2018-05-22 RX ADMIN — Medication 325 MILLIGRAM(S): at 18:16

## 2018-05-22 RX ADMIN — Medication 15 MILLIGRAM(S): at 06:25

## 2018-05-22 RX ADMIN — Medication 650 MILLIGRAM(S): at 13:53

## 2018-05-22 RX ADMIN — HYDROMORPHONE HYDROCHLORIDE 30 MILLILITER(S): 2 INJECTION INTRAMUSCULAR; INTRAVENOUS; SUBCUTANEOUS at 15:30

## 2018-05-22 RX ADMIN — Medication 102 MILLIGRAM(S): at 07:10

## 2018-05-22 RX ADMIN — Medication 1 TABLET(S): at 18:16

## 2018-05-22 RX ADMIN — HYDROMORPHONE HYDROCHLORIDE 0.5 MILLIGRAM(S): 2 INJECTION INTRAMUSCULAR; INTRAVENOUS; SUBCUTANEOUS at 15:29

## 2018-05-22 NOTE — PHYSICAL THERAPY INITIAL EVALUATION ADULT - IMPAIRED TRANSFERS: SIT/STAND, REHAB EVAL
impaired balance/WILLIE CALDERON aware of pain/decreased strength/impaired postural control/pain/decreased ROM

## 2018-05-22 NOTE — PROGRESS NOTE BEHAVIORAL HEALTH - SUMMARY
73 yo female, with past psychiatric history of anxiety NOS and benzodiazepine abuse (w/ reported hist of seizures from benzo withdrawal), alcohol abuse (no hist withdrawal), PMH osteonecrosis R femoral neck s/p CRPP, CAD (s/p stents), HTN, HLD, psych consulted for anxiety and insomnia. Patient denies depressed mood, SI/HI. She requests medication for her anxiety and insomnia, suggesting Ativan or Xanax. Pt. reported that Atarax did not help last night. Pt. reported good response to Seroquel in th past.    Seen and examined.  Pt received PRN Seroquel 50mg at 13:00  Per staff pt up all night and continues to request pain medication  Pt educated on availability of PCA pump and how to use it.   1. Continue Seroquel 100mg qhs to aid sleep  2. PRN Seroquel 50 mg PO q6hrs for anxiety or insomnia  3.. Consider increasing gabapentin dosage as it can decrease anxiety.

## 2018-05-22 NOTE — PHYSICAL THERAPY INITIAL EVALUATION ADULT - PHYSICAL ASSIST/NONPHYSICAL ASSIST: SUPINE/SIT, REHAB EVAL
nonverbal cues (demo/gestures)/verbal cues/supervision/1 person + 1 person to manage equipment
1 person assist/verbal cues

## 2018-05-22 NOTE — OCCUPATIONAL THERAPY INITIAL EVALUATION ADULT - PERTINENT HX OF CURRENT PROBLEM, REHAB EVAL
72y Female w/ hx of R femoral neck fx s/p CRPP at [facility] 10/17, CAD s/p multiple stents (remote), HTN, HLD, EtOH abuse, and anxiety, presents c/o months of progressively worsening R hip pain. Pt now s/p right USAMA.

## 2018-05-22 NOTE — PHYSICAL THERAPY INITIAL EVALUATION ADULT - ADDITIONAL COMMENTS
Patient reports she lives with her , +steps to manage. Patient reports she was previously independent in all ADLs and did not use an assistive device for ambulation.     Patient was left semi-supine in bed as found, +knee immobilizer and abduction brace, all lines/tubes intact and call finney within reach, WILLIE carreon
+stairs at home

## 2018-05-22 NOTE — PROGRESS NOTE BEHAVIORAL HEALTH - NSBHCHARTREVIEWLAB_PSY_A_CORE FT
05-18    139  |  99  |  28<H>  ----------------------------<  93  4.4   |  30  |  0.85    Ca    8.9      18 May 2018 05:18                          12.2   5.56  )-----------( 269      ( 18 May 2018 05:18 )             38.0
10.7   10.91 )-----------( 189      ( 22 May 2018 05:41 )             33.7   05-22    138  |  99  |  27<H>  ----------------------------<  162<H>  5.0   |  22  |  0.73    Ca    8.1<L>      22 May 2018 05:41    TPro  7.1  /  Alb  3.9  /  TBili  0.3  /  DBili  x   /  AST  12  /  ALT  11  /  AlkPhos  64  05-21
LABS:                        12.2   5.79  )-----------( 242      ( 17 May 2018 06:30 )             37.5     17 May 2018 06:30    136    |  99     |  26     ----------------------------<  92     4.4     |  32     |  0.83     Ca    9.2        17 May 2018 06:30      PT/INR - ( 17 May 2018 06:30 )   PT: 11.3 SEC;   INR: 1.02          PTT - ( 17 May 2018 06:30 )  PTT:30.0 SEC

## 2018-05-22 NOTE — PHYSICAL THERAPY INITIAL EVALUATION ADULT - PLANNED THERAPY INTERVENTIONS, PT EVAL
ROM/gait training/strengthening/Patient left sitting in chair in NAD; call finney in reach; WILLIE Enamorado made aware./transfer training/bed mobility training
gait training/strengthening/transfer training/bed mobility training

## 2018-05-22 NOTE — PHYSICAL THERAPY INITIAL EVALUATION ADULT - RANGE OF MOTION EXAMINATION, REHAB EVAL
Right hip ROM 0-50, Right knee not tested secondary to immobilizer, all precautions maintained/bilateral upper extremity ROM was WFL (within functional limits)/Left LE ROM was WFL (within functional limits)

## 2018-05-22 NOTE — PHYSICAL THERAPY INITIAL EVALUATION ADULT - GAIT DEVIATIONS NOTED, PT EVAL
decreased step length/decreased jaylene/decreased weight-shifting ability
decreased jaylene/decreased step length/decreased stride length/increased time in double stance/decreased velocity of limb motion/decreased weight-shifting ability

## 2018-05-22 NOTE — PHYSICAL THERAPY INITIAL EVALUATION ADULT - IMPAIRMENTS FOUND, PT EVAL
gait, locomotion, and balance/ROM
gait, locomotion, and balance/posture/ROM/joint integrity and mobility/muscle strength

## 2018-05-22 NOTE — PHYSICAL THERAPY INITIAL EVALUATION ADULT - GENERAL OBSERVATIONS, REHAB EVAL
Patient found supine in bed in NAD.
Patient was received semi-supine in bed, c/o pain in right hip but willing to participate in PT evaluation, RN Arlin carreon. +PCA pump, +IV.

## 2018-05-22 NOTE — PHYSICAL THERAPY INITIAL EVALUATION ADULT - PERTINENT HX OF CURRENT PROBLEM, REHAB EVAL
Patient is a 72 year old Female w/ hx of R femoral neck fx s/p CRPP at [facility] 10/17, CAD s/p multiple stents (remote), HTN, HLD, EtOH abuse, and anxiety, presents c/o months of progressively worsening R hip pain. Pt now s/p right USAMA on 5/21/18.
72y Female w/ hx of R femoral neck fx s/p CRPP at [facility] 10/17, CAD s/p multiple stents (remote), HTN, HLD, EtOH abuse, and anxiety, who is a community ambulator w/o assistive devices presents c/o months of progressively worsening R hip pain that has now become so bad that she is unable to ambulate. Xray right hip showed femoral head necrosis with collapse and backing out of the screws. As per chart, Dr Bergeron reports infection workup and if not planned USAMA for later this week.

## 2018-05-22 NOTE — PHYSICAL THERAPY INITIAL EVALUATION ADULT - CRITERIA FOR SKILLED THERAPEUTIC INTERVENTIONS
functional limitations in following categories/bed mobility, transfers, ambulation
impairments found

## 2018-05-22 NOTE — PHYSICAL THERAPY INITIAL EVALUATION ADULT - PHYSICAL ASSIST/NONPHYSICAL ASSIST: STAND/SIT, REHAB EVAL
1 person assist/verbal cues
nonverbal cues (demo/gestures)/verbal cues/supervision/1 person + 1 person to manage equipment

## 2018-05-22 NOTE — DIETITIAN INITIAL EVALUATION ADULT. - OTHER INFO
Pt states that her appetite has been good and she has been eating well. She stated "I eat well in a lot of pain". Pt states that she has difficulty swallowing steak and often times chicken. Suggest a Speech and Swallow study to determine Pt's ability. Pt had no complaints of GI distress. Pt stated her usual weight was 130 lbs and her current weight is 114 lbs. When questioned about weight difference, Pt states that she has been eating less that normal but really did not see the weight change.

## 2018-05-22 NOTE — PHYSICAL THERAPY INITIAL EVALUATION ADULT - MANUAL MUSCLE TESTING RESULTS, REHAB EVAL
bilateral UEs 5/5, Left LE 5/5, Right LE grossly at least 3+/5 throughout/grossly assessed due to
lorena UE and LE 3+/5 t/o except right hip and knee 2+/5 grossly

## 2018-05-22 NOTE — CHART NOTE - NSCHARTNOTEFT_GEN_A_CORE
NUTRITION SERVICES                                                                                  MALNUTRITION ALERT     Attention Health Care Provider: Upon nutritional assessment by the Registered Dietitian your patient was determined to meet criteria / has evidence of the following diagnosis/diagnoses:    [ ] Mild Protein Calorie Malnutrition   [ ] Moderate Protein Calorie Malnutrition   [X ] Severe Protein Calorie Malnutrition   [ ] Unspecified Protein Calorie Malnutrition   [ ] Underweight / BMI <19  [ ] Morbid Obesity / BMI >40        By signing this assessment you are acknowledging the diagnosis/diagnoses.       PLAN OF CARE: Refer to Initial Dietitian Evaluation or Nutrition Follow-Up Documentation for Nutritional Recommendations.

## 2018-05-22 NOTE — PHYSICAL THERAPY INITIAL EVALUATION ADULT - PHYSICAL ASSIST/NONPHYSICAL ASSIST: GAIT, REHAB EVAL
verbal cues/1 person assist
nonverbal cues (demo/gestures)/verbal cues/supervision/1 person + 1 person to manage equipment

## 2018-05-22 NOTE — PROGRESS NOTE BEHAVIORAL HEALTH - NSBHFUPINTERVALHXFT_PSY_A_CORE
Chart reviewed. Pt required 1 prn Seroquel 50mg @ 13:00.  Per staff pt was up all night, pt continues to request pain medication. Pt currently on PCA pump Dilaudid 0.5mg q15 for pain.  Seen and examined at bedside. Pt reports feeling pain and still not being able to sleep at night. Pt requesting ativan or xanex.

## 2018-05-22 NOTE — PHYSICAL THERAPY INITIAL EVALUATION ADULT - PATIENT PROFILE REVIEW, REHAB EVAL
PT orders received-->ambulate as tolerated. Consult with WILLIE CALDERON-->pt OK to participate in PT evaluation./yes

## 2018-05-22 NOTE — PROGRESS NOTE BEHAVIORAL HEALTH - NSBHCONSULTFOLLOWAFTERCARE_PSY_A_CORE FT
[facility] Geriatric outpt. clinic: [phone]  [facility] outpt. walk in clinic : [phone] (9AM-7PM)  [facility] Outpatient clinic: [phone]

## 2018-05-22 NOTE — PHYSICAL THERAPY INITIAL EVALUATION ADULT - WEIGHT-BEARING RESTRICTIONS: GAIT, REHAB EVAL
right LE/weight-bearing as tolerated
Right knee WBAT in knee immobilizer/weight-bearing as tolerated

## 2018-05-22 NOTE — PHYSICAL THERAPY INITIAL EVALUATION ADULT - IMPAIRMENTS CONTRIBUTING TO GAIT DEVIATIONS, PT EVAL
impaired balance/pain
impaired balance/impaired postural control/WILLIE CALDERON aware of pain/pain/decreased strength/decreased ROM

## 2018-05-23 PROCEDURE — 99233 SBSQ HOSP IP/OBS HIGH 50: CPT

## 2018-05-23 RX ORDER — HYDROMORPHONE HYDROCHLORIDE 2 MG/ML
6 INJECTION INTRAMUSCULAR; INTRAVENOUS; SUBCUTANEOUS
Qty: 0 | Refills: 0 | Status: DISCONTINUED | OUTPATIENT
Start: 2018-05-23 | End: 2018-05-25

## 2018-05-23 RX ORDER — GABAPENTIN 400 MG/1
300 CAPSULE ORAL THREE TIMES A DAY
Qty: 0 | Refills: 0 | Status: DISCONTINUED | OUTPATIENT
Start: 2018-05-23 | End: 2018-05-25

## 2018-05-23 RX ORDER — HYDROMORPHONE HYDROCHLORIDE 2 MG/ML
4 INJECTION INTRAMUSCULAR; INTRAVENOUS; SUBCUTANEOUS
Qty: 0 | Refills: 0 | Status: DISCONTINUED | OUTPATIENT
Start: 2018-05-23 | End: 2018-05-25

## 2018-05-23 RX ADMIN — HYDROMORPHONE HYDROCHLORIDE 6 MILLIGRAM(S): 2 INJECTION INTRAMUSCULAR; INTRAVENOUS; SUBCUTANEOUS at 19:53

## 2018-05-23 RX ADMIN — Medication 200 MILLIGRAM(S): at 21:43

## 2018-05-23 RX ADMIN — Medication 650 MILLIGRAM(S): at 16:45

## 2018-05-23 RX ADMIN — Medication 50 MILLIGRAM(S): at 05:48

## 2018-05-23 RX ADMIN — Medication 15 MILLIGRAM(S): at 13:08

## 2018-05-23 RX ADMIN — HYDROMORPHONE HYDROCHLORIDE 6 MILLIGRAM(S): 2 INJECTION INTRAMUSCULAR; INTRAVENOUS; SUBCUTANEOUS at 13:06

## 2018-05-23 RX ADMIN — Medication 1 TABLET(S): at 18:23

## 2018-05-23 RX ADMIN — GABAPENTIN 200 MILLIGRAM(S): 400 CAPSULE ORAL at 13:09

## 2018-05-23 RX ADMIN — Medication 325 MILLIGRAM(S): at 18:22

## 2018-05-23 RX ADMIN — Medication 15 MILLIGRAM(S): at 18:22

## 2018-05-23 RX ADMIN — Medication 1 TABLET(S): at 05:48

## 2018-05-23 RX ADMIN — PANTOPRAZOLE SODIUM 40 MILLIGRAM(S): 20 TABLET, DELAYED RELEASE ORAL at 13:10

## 2018-05-23 RX ADMIN — HYDROMORPHONE HYDROCHLORIDE 6 MILLIGRAM(S): 2 INJECTION INTRAMUSCULAR; INTRAVENOUS; SUBCUTANEOUS at 09:08

## 2018-05-23 RX ADMIN — POLYETHYLENE GLYCOL 3350 17 GRAM(S): 17 POWDER, FOR SOLUTION ORAL at 16:44

## 2018-05-23 RX ADMIN — QUETIAPINE FUMARATE 50 MILLIGRAM(S): 200 TABLET, FILM COATED ORAL at 10:37

## 2018-05-23 RX ADMIN — Medication 15 MILLIGRAM(S): at 05:48

## 2018-05-23 RX ADMIN — Medication 100 MILLIGRAM(S): at 21:42

## 2018-05-23 RX ADMIN — Medication 1 PATCH: at 13:10

## 2018-05-23 RX ADMIN — Medication 325 MILLIGRAM(S): at 05:50

## 2018-05-23 RX ADMIN — HYDROMORPHONE HYDROCHLORIDE 6 MILLIGRAM(S): 2 INJECTION INTRAMUSCULAR; INTRAVENOUS; SUBCUTANEOUS at 23:02

## 2018-05-23 RX ADMIN — Medication 100 MILLIGRAM(S): at 18:22

## 2018-05-23 RX ADMIN — HYDROMORPHONE HYDROCHLORIDE 6 MILLIGRAM(S): 2 INJECTION INTRAMUSCULAR; INTRAVENOUS; SUBCUTANEOUS at 16:49

## 2018-05-23 RX ADMIN — HYDROMORPHONE HYDROCHLORIDE 6 MILLIGRAM(S): 2 INJECTION INTRAMUSCULAR; INTRAVENOUS; SUBCUTANEOUS at 14:07

## 2018-05-23 RX ADMIN — Medication 50 MILLIGRAM(S): at 21:43

## 2018-05-23 RX ADMIN — GABAPENTIN 200 MILLIGRAM(S): 400 CAPSULE ORAL at 05:49

## 2018-05-23 RX ADMIN — GABAPENTIN 300 MILLIGRAM(S): 400 CAPSULE ORAL at 23:04

## 2018-05-23 RX ADMIN — Medication 650 MILLIGRAM(S): at 05:50

## 2018-05-23 RX ADMIN — Medication 6 MILLIGRAM(S): at 21:43

## 2018-05-23 RX ADMIN — Medication 90 MILLIGRAM(S): at 05:51

## 2018-05-23 RX ADMIN — Medication 15 MILLIGRAM(S): at 00:29

## 2018-05-23 RX ADMIN — Medication 200 MILLIGRAM(S): at 05:48

## 2018-05-23 RX ADMIN — HYDROMORPHONE HYDROCHLORIDE 6 MILLIGRAM(S): 2 INJECTION INTRAMUSCULAR; INTRAVENOUS; SUBCUTANEOUS at 20:50

## 2018-05-23 RX ADMIN — QUETIAPINE FUMARATE 100 MILLIGRAM(S): 200 TABLET, FILM COATED ORAL at 21:43

## 2018-05-23 RX ADMIN — HYDROMORPHONE HYDROCHLORIDE 6 MILLIGRAM(S): 2 INJECTION INTRAMUSCULAR; INTRAVENOUS; SUBCUTANEOUS at 10:08

## 2018-05-24 PROCEDURE — 99232 SBSQ HOSP IP/OBS MODERATE 35: CPT

## 2018-05-24 RX ORDER — QUETIAPINE FUMARATE 200 MG/1
150 TABLET, FILM COATED ORAL AT BEDTIME
Qty: 0 | Refills: 0 | Status: DISCONTINUED | OUTPATIENT
Start: 2018-05-24 | End: 2018-05-25

## 2018-05-24 RX ORDER — QUETIAPINE FUMARATE 200 MG/1
50 TABLET, FILM COATED ORAL DAILY
Qty: 0 | Refills: 0 | Status: DISCONTINUED | OUTPATIENT
Start: 2018-05-24 | End: 2018-05-25

## 2018-05-24 RX ADMIN — HYDROMORPHONE HYDROCHLORIDE 6 MILLIGRAM(S): 2 INJECTION INTRAMUSCULAR; INTRAVENOUS; SUBCUTANEOUS at 04:00

## 2018-05-24 RX ADMIN — GABAPENTIN 300 MILLIGRAM(S): 400 CAPSULE ORAL at 22:53

## 2018-05-24 RX ADMIN — HYDROMORPHONE HYDROCHLORIDE 6 MILLIGRAM(S): 2 INJECTION INTRAMUSCULAR; INTRAVENOUS; SUBCUTANEOUS at 02:59

## 2018-05-24 RX ADMIN — PANTOPRAZOLE SODIUM 40 MILLIGRAM(S): 20 TABLET, DELAYED RELEASE ORAL at 12:36

## 2018-05-24 RX ADMIN — Medication 90 MILLIGRAM(S): at 06:03

## 2018-05-24 RX ADMIN — Medication 650 MILLIGRAM(S): at 19:42

## 2018-05-24 RX ADMIN — Medication 200 MILLIGRAM(S): at 06:03

## 2018-05-24 RX ADMIN — Medication 6 MILLIGRAM(S): at 22:53

## 2018-05-24 RX ADMIN — Medication 325 MILLIGRAM(S): at 17:19

## 2018-05-24 RX ADMIN — Medication 100 MILLIGRAM(S): at 06:03

## 2018-05-24 RX ADMIN — Medication 325 MILLIGRAM(S): at 06:03

## 2018-05-24 RX ADMIN — HYDROMORPHONE HYDROCHLORIDE 6 MILLIGRAM(S): 2 INJECTION INTRAMUSCULAR; INTRAVENOUS; SUBCUTANEOUS at 06:04

## 2018-05-24 RX ADMIN — Medication 1 TABLET(S): at 17:16

## 2018-05-24 RX ADMIN — HYDROMORPHONE HYDROCHLORIDE 6 MILLIGRAM(S): 2 INJECTION INTRAMUSCULAR; INTRAVENOUS; SUBCUTANEOUS at 20:46

## 2018-05-24 RX ADMIN — HYDROMORPHONE HYDROCHLORIDE 6 MILLIGRAM(S): 2 INJECTION INTRAMUSCULAR; INTRAVENOUS; SUBCUTANEOUS at 11:02

## 2018-05-24 RX ADMIN — HYDROMORPHONE HYDROCHLORIDE 6 MILLIGRAM(S): 2 INJECTION INTRAMUSCULAR; INTRAVENOUS; SUBCUTANEOUS at 00:00

## 2018-05-24 RX ADMIN — QUETIAPINE FUMARATE 150 MILLIGRAM(S): 200 TABLET, FILM COATED ORAL at 22:54

## 2018-05-24 RX ADMIN — QUETIAPINE FUMARATE 50 MILLIGRAM(S): 200 TABLET, FILM COATED ORAL at 12:35

## 2018-05-24 RX ADMIN — Medication 50 MILLIGRAM(S): at 22:53

## 2018-05-24 RX ADMIN — HYDROMORPHONE HYDROCHLORIDE 6 MILLIGRAM(S): 2 INJECTION INTRAMUSCULAR; INTRAVENOUS; SUBCUTANEOUS at 13:07

## 2018-05-24 RX ADMIN — GABAPENTIN 300 MILLIGRAM(S): 400 CAPSULE ORAL at 15:09

## 2018-05-24 RX ADMIN — HYDROMORPHONE HYDROCHLORIDE 6 MILLIGRAM(S): 2 INJECTION INTRAMUSCULAR; INTRAVENOUS; SUBCUTANEOUS at 10:02

## 2018-05-24 RX ADMIN — QUETIAPINE FUMARATE 50 MILLIGRAM(S): 200 TABLET, FILM COATED ORAL at 00:20

## 2018-05-24 RX ADMIN — GABAPENTIN 300 MILLIGRAM(S): 400 CAPSULE ORAL at 06:04

## 2018-05-24 RX ADMIN — HYDROMORPHONE HYDROCHLORIDE 6 MILLIGRAM(S): 2 INJECTION INTRAMUSCULAR; INTRAVENOUS; SUBCUTANEOUS at 21:45

## 2018-05-24 RX ADMIN — Medication 50 MILLIGRAM(S): at 15:10

## 2018-05-24 RX ADMIN — Medication 50 MILLIGRAM(S): at 06:03

## 2018-05-24 RX ADMIN — HYDROMORPHONE HYDROCHLORIDE 6 MILLIGRAM(S): 2 INJECTION INTRAMUSCULAR; INTRAVENOUS; SUBCUTANEOUS at 17:16

## 2018-05-24 RX ADMIN — Medication 200 MILLIGRAM(S): at 15:09

## 2018-05-24 RX ADMIN — Medication 200 MILLIGRAM(S): at 22:54

## 2018-05-24 RX ADMIN — Medication 650 MILLIGRAM(S): at 18:42

## 2018-05-24 RX ADMIN — Medication 1 TABLET(S): at 06:03

## 2018-05-24 RX ADMIN — HYDROMORPHONE HYDROCHLORIDE 6 MILLIGRAM(S): 2 INJECTION INTRAMUSCULAR; INTRAVENOUS; SUBCUTANEOUS at 18:16

## 2018-05-24 RX ADMIN — HYDROMORPHONE HYDROCHLORIDE 6 MILLIGRAM(S): 2 INJECTION INTRAMUSCULAR; INTRAVENOUS; SUBCUTANEOUS at 14:07

## 2018-05-24 NOTE — PROGRESS NOTE BEHAVIORAL HEALTH - NSBHATTESTSEENBY_PSY_A_CORE
NP without Attending Psychiatrist
NP without Attending Psychiatrist
Attending Psychiatrist supervising NP/Trainee, meeting pt...
Attending Psychiatrist supervising NP/Trainee, meeting pt...

## 2018-05-24 NOTE — PROGRESS NOTE BEHAVIORAL HEALTH - SUMMARY
71 yo female, with past psychiatric history of anxiety NOS and benzodiazepine abuse (w/ reported hist of seizures from benzo withdrawal), alcohol abuse (no hist withdrawal), PMH osteonecrosis R femoral neck s/p CRPP, CAD (s/p stents), HTN, HLD, psych consulted for anxiety and insomnia. Patient denies depressed mood, SI/HI. Seroquel started for anxiety. Patient went to OR on 5/21. She continues to request Ativan or Xanax for anxiety but reports Seroquel is helping her anxiety and insomnia.    1. Increase to Seroquel 50 mg qAM + 150mg qhs for anxiety and insomnia  2. PRN Seroquel 50 mg PO q6hrs for anxiety or insomnia  3. Consider increasing gabapentin dosage as it can decrease anxiety. 71 yo female, with past psychiatric history of anxiety NOS and benzodiazepine abuse (w/ reported hist of seizures from benzo withdrawal), alcohol abuse (no hist withdrawal), PMH osteonecrosis R femoral neck s/p CRPP, CAD (s/p stents), HTN, HLD, psych consulted for anxiety and insomnia. Patient denies depressed mood, SI/HI. Denies psychotic sxs. Seroquel started for anxiety due to limited choices, given h/o benzo. abuse, now on opioids and  as pt. did well on Seroquel in the past. Patient went to OR on 5/21. She continues to request Ativan or Xanax for anxiety but reports Seroquel is helping her anxiety and insomnia and agreeing with current plan.     1. Increase to Seroquel 50 mg qAM + 150mg qhs for anxiety and insomnia  2. PRN Seroquel 50 mg PO q6hrs for anxiety or insomnia  3. Consider increasing gabapentin dosage as it can decrease anxiety.

## 2018-05-24 NOTE — PROGRESS NOTE BEHAVIORAL HEALTH - PRIMARY DX
Adjustment disorder with anxious mood

## 2018-05-24 NOTE — PROGRESS NOTE BEHAVIORAL HEALTH - NSBHCHARTREVIEWVS_PSY_A_CORE FT
Vital Signs Last 24 Hrs  T(C): 36.8 (18 May 2018 17:06), Max: 36.8 (18 May 2018 00:49)  T(F): 98.2 (18 May 2018 17:06), Max: 98.3 (18 May 2018 13:18)  HR: 79 (18 May 2018 17:06) (52 - 79)  BP: 168/61 (18 May 2018 17:06) (127/50 - 168/61)  BP(mean): --  RR: 18 (18 May 2018 17:06) (17 - 18)  SpO2: 95% (18 May 2018 17:06) (95% - 98%)
Vital Signs Last 24 Hrs  T(C): 36.9 (22 May 2018 13:48), Max: 37 (21 May 2018 19:15)  T(F): 98.4 (22 May 2018 13:48), Max: 98.6 (21 May 2018 19:15)  HR: 79 (22 May 2018 15:26) (60 - 79)  BP: 113/95 (22 May 2018 15:26) (113/95 - 164/62)  BP(mean): --  RR: 18 (22 May 2018 13:48) (18 - 22)  SpO2: 100% (22 May 2018 13:48) (94% - 100%)
Vital Signs Last 24 Hrs  T(C): 36.9 (24 May 2018 09:50), Max: 37.1 (23 May 2018 18:12)  T(F): 98.5 (24 May 2018 09:50), Max: 98.7 (23 May 2018 18:12)  HR: 87 (24 May 2018 09:50) (72 - 87)  BP: 104/44 (24 May 2018 09:50) (104/44 - 147/55)  BP(mean): --  RR: 18 (24 May 2018 09:50) (18 - 18)  SpO2: 95% (24 May 2018 09:50) (95% - 100%)
ICU Vital Signs Last 24 Hrs  T(C): 36.8 (17 May 2018 09:23), Max: 36.8 (16 May 2018 21:07)  T(F): 98.2 (17 May 2018 09:23), Max: 98.2 (16 May 2018 21:07)  HR: 65 (17 May 2018 09:23) (56 - 67)  BP: 149/71 (17 May 2018 09:23) (111/43 - 175/58)  BP(mean): --  ABP: --  ABP(mean): --  RR: 18 (17 May 2018 09:23) (18 - 18)  SpO2: 96% (17 May 2018 09:23) (95% - 100%)

## 2018-05-24 NOTE — PROGRESS NOTE BEHAVIORAL HEALTH - RISK ASSESSMENT
Risk factors-older age, substance abuse history, history of anxiety, medical co-morbidities, insomnia, caring for  who suffered a stroke  Protective factors- domiciled, , supportive family, help seeking, denies suicidal and homicidal ideations, no past suicide attempts    Based on risk assessment, this patient DOES NOT present a risk to self or others at this time. Risk factors-older age, substance abuse history, history of anxiety, medical co-morbidities, insomnia, caring for  who suffered a stroke  Protective factors- domiciled, , supportive family, help seeking, denies suicidal and homicidal ideations, no past suicide attempts    Based on risk assessment, this patient DOES NOT present a risk to self or others at this time. Does not need psych. admission.

## 2018-05-24 NOTE — PROGRESS NOTE BEHAVIORAL HEALTH - NSBHFUPINTERVALCCFT_PSY_A_CORE
"Im in pain can you order me percocet"
"Im in pain and still not sleeping"
"Im in pain"
"I think it would really help if I had Ativan"

## 2018-05-24 NOTE — PROGRESS NOTE BEHAVIORAL HEALTH - CASE SUMMARY
73 yo female, with past psychiatric history of anxiety NOS and benzodiazepine abuse (w/ reported hist of seizures from benzo withdrawal), alcohol abuse (no hist withdrawal), PMH osteonecrosis R femoral neck s/p CRPP, CAD (s/p stents), HTN, HLD, psych consulted for anxiety and insomnia. Pt. seen and evaluated, AAOX4, cooperative but anxious. Patient denies SI and HI, denies psychotic sxs. Patient reported feeling anxious in the context of ongoing medical issues and pain. Patient stated that Atarax did not help her and reported that she did well on Seroquel in the past. Given pt.'s age, currently on opioids and has h/o benzo. abuse, options are limited at this time, recommend to start Seroquel 50mg po Q6h PRN for anxiety and insomnia, discontinue Atarax PRN. Monitor EKG for qtc.
73 yo female, with past psychiatric history of anxiety NOS and benzodiazepine abuse (w/ reported hist of seizures from benzo withdrawal), alcohol abuse (no hist withdrawal), PMH osteonecrosis R femoral neck s/p CRPP, CAD (s/p stents), HTN, HLD, psych consulted for anxiety and insomnia. Pt. seen and evaluated, AAOX3, appeared more calmer today. Pt. denies feeling depressed, continues to feel anxious with poor sleep. Stated that due to pain she is not able to sleep very well. Patient stated that Seroquel helped her in the past and has been helping currently as well. She requested to increase the dose as she was on higher dose in the past. Pt. denies SI and HI, denies psychotic sxs. Recommend meds. as written above, will follow

## 2018-05-24 NOTE — PROGRESS NOTE BEHAVIORAL HEALTH - NSBHCONSULTFOLLOWAFTERCARE_PSY_A_CORE FT
[facility]: [phone]  [facility] out[facility] walk in clinic : [phone] (9AM-7PM)  [facility] clinic: [phone]

## 2018-05-24 NOTE — PROGRESS NOTE BEHAVIORAL HEALTH - BODY HABITUS
Well nourished/Underweight
Underweight/Well nourished

## 2018-05-24 NOTE — PROGRESS NOTE BEHAVIORAL HEALTH - NSBHFUPREASONCONS_PSY_A_CORE
anxiety/sleep disturbance

## 2018-05-24 NOTE — PROGRESS NOTE BEHAVIORAL HEALTH - THOUGHT PROCESS
Linear/Normal reasoning/Perseverative
Linear/Perseverative/Normal reasoning
Perseverative/Linear/Normal reasoning
Normal reasoning/Linear/Perseverative

## 2018-05-24 NOTE — PROGRESS NOTE BEHAVIORAL HEALTH - NSBHFUPINTERVALHXFT_PSY_A_CORE
Pt required 2 prn Seroquel 50mg @ 10:37 yesterday and 0:20. Pt reports ongoing pain and anxiety as well as poor sleep. She reports Seroquel is helpful but she does not feel it is enough for her. She reports beeing on Seroquel 200 mg in the past. Patient requesting ativan for anxiety. Risk of respiratory depression with benzos and opioids discussed and patient expresses understanding. Also to be avoided due to hx of benzo dependence/withdrawal. Pt required 2 prn Seroquel 50mg @ 10:37 yesterday and 0:20. Pt reports ongoing pain and anxiety as well as poor sleep. She reports Seroquel is helpful but she does not feel it is enough for her. She reports being on Seroquel 200 mg in the past which was very helpful. Patient requesting ativan for anxiety. Risk of oversedation and respiratory depression with benzos and opioids discussed and patient expresses understanding. Also to be avoided due to hx of benzo dependence/withdrawal.

## 2018-05-25 ENCOUNTER — TRANSCRIPTION ENCOUNTER (OUTPATIENT)
Age: 73
End: 2018-05-25

## 2018-05-25 VITALS
SYSTOLIC BLOOD PRESSURE: 129 MMHG | RESPIRATION RATE: 17 BRPM | DIASTOLIC BLOOD PRESSURE: 73 MMHG | HEART RATE: 72 BPM | OXYGEN SATURATION: 100 % | TEMPERATURE: 97 F

## 2018-05-25 PROCEDURE — 99233 SBSQ HOSP IP/OBS HIGH 50: CPT

## 2018-05-25 RX ORDER — DOCUSATE SODIUM 100 MG
1 CAPSULE ORAL
Qty: 0 | Refills: 0 | COMMUNITY
Start: 2018-05-25

## 2018-05-25 RX ORDER — ACETAMINOPHEN 500 MG
2 TABLET ORAL
Qty: 0 | Refills: 0 | COMMUNITY
Start: 2018-05-25

## 2018-05-25 RX ORDER — NIFEDIPINE 30 MG
90 TABLET, EXTENDED RELEASE 24 HR ORAL
Qty: 0 | Refills: 0 | COMMUNITY

## 2018-05-25 RX ORDER — SENNA PLUS 8.6 MG/1
2 TABLET ORAL
Qty: 0 | Refills: 0 | COMMUNITY
Start: 2018-05-25

## 2018-05-25 RX ORDER — CARVEDILOL PHOSPHATE 80 MG/1
1 CAPSULE, EXTENDED RELEASE ORAL
Qty: 0 | Refills: 0 | COMMUNITY

## 2018-05-25 RX ORDER — LABETALOL HCL 100 MG
1 TABLET ORAL
Qty: 0 | Refills: 0 | COMMUNITY
Start: 2018-05-25

## 2018-05-25 RX ORDER — IBUPROFEN 200 MG
1 TABLET ORAL
Qty: 0 | Refills: 0 | COMMUNITY

## 2018-05-25 RX ORDER — NICOTINE POLACRILEX 2 MG
1 GUM BUCCAL
Qty: 0 | Refills: 0 | COMMUNITY
Start: 2018-05-25

## 2018-05-25 RX ORDER — PANTOPRAZOLE SODIUM 20 MG/1
1 TABLET, DELAYED RELEASE ORAL
Qty: 0 | Refills: 0 | COMMUNITY
Start: 2018-05-25

## 2018-05-25 RX ORDER — HYDROMORPHONE HYDROCHLORIDE 2 MG/ML
3 INJECTION INTRAMUSCULAR; INTRAVENOUS; SUBCUTANEOUS
Qty: 0 | Refills: 0 | COMMUNITY
Start: 2018-05-25

## 2018-05-25 RX ORDER — QUETIAPINE FUMARATE 200 MG/1
1 TABLET, FILM COATED ORAL
Qty: 0 | Refills: 0 | COMMUNITY
Start: 2018-05-25

## 2018-05-25 RX ORDER — NIFEDIPINE 30 MG
1 TABLET, EXTENDED RELEASE 24 HR ORAL
Qty: 0 | Refills: 0 | COMMUNITY
Start: 2018-05-25

## 2018-05-25 RX ORDER — HYDRALAZINE HCL 50 MG
1 TABLET ORAL
Qty: 0 | Refills: 0 | COMMUNITY

## 2018-05-25 RX ORDER — GABAPENTIN 400 MG/1
1 CAPSULE ORAL
Qty: 0 | Refills: 0 | COMMUNITY
Start: 2018-05-25

## 2018-05-25 RX ORDER — ACETAMINOPHEN WITH CODEINE 300MG-30MG
1 TABLET ORAL
Qty: 0 | Refills: 0 | COMMUNITY

## 2018-05-25 RX ORDER — ACETAMINOPHEN 500 MG
975 TABLET ORAL ONCE
Qty: 0 | Refills: 0 | Status: COMPLETED | OUTPATIENT
Start: 2018-05-25 | End: 2018-05-25

## 2018-05-25 RX ORDER — TRAMADOL HYDROCHLORIDE 50 MG/1
1 TABLET ORAL
Qty: 0 | Refills: 0 | COMMUNITY

## 2018-05-25 RX ORDER — ASPIRIN/CALCIUM CARB/MAGNESIUM 324 MG
1 TABLET ORAL
Qty: 0 | Refills: 0 | COMMUNITY
Start: 2018-05-25

## 2018-05-25 RX ORDER — POLYETHYLENE GLYCOL 3350 17 G/17G
17 POWDER, FOR SOLUTION ORAL
Qty: 0 | Refills: 0 | COMMUNITY
Start: 2018-05-25

## 2018-05-25 RX ORDER — HYDROMORPHONE HYDROCHLORIDE 2 MG/ML
1 INJECTION INTRAMUSCULAR; INTRAVENOUS; SUBCUTANEOUS
Qty: 0 | Refills: 0 | COMMUNITY
Start: 2018-05-25

## 2018-05-25 RX ORDER — QUETIAPINE FUMARATE 200 MG/1
3 TABLET, FILM COATED ORAL
Qty: 0 | Refills: 0 | COMMUNITY
Start: 2018-05-25

## 2018-05-25 RX ORDER — HYDRALAZINE HCL 50 MG
1 TABLET ORAL
Qty: 0 | Refills: 0 | COMMUNITY
Start: 2018-05-25

## 2018-05-25 RX ORDER — TIZANIDINE 4 MG/1
1 TABLET ORAL
Qty: 0 | Refills: 0 | COMMUNITY
Start: 2018-05-25

## 2018-05-25 RX ADMIN — QUETIAPINE FUMARATE 50 MILLIGRAM(S): 200 TABLET, FILM COATED ORAL at 11:30

## 2018-05-25 RX ADMIN — HYDROMORPHONE HYDROCHLORIDE 6 MILLIGRAM(S): 2 INJECTION INTRAMUSCULAR; INTRAVENOUS; SUBCUTANEOUS at 06:55

## 2018-05-25 RX ADMIN — Medication 200 MILLIGRAM(S): at 05:48

## 2018-05-25 RX ADMIN — Medication 975 MILLIGRAM(S): at 11:30

## 2018-05-25 RX ADMIN — HYDROMORPHONE HYDROCHLORIDE 6 MILLIGRAM(S): 2 INJECTION INTRAMUSCULAR; INTRAVENOUS; SUBCUTANEOUS at 05:58

## 2018-05-25 RX ADMIN — Medication 50 MILLIGRAM(S): at 05:48

## 2018-05-25 RX ADMIN — Medication 90 MILLIGRAM(S): at 05:48

## 2018-05-25 RX ADMIN — HYDROMORPHONE HYDROCHLORIDE 6 MILLIGRAM(S): 2 INJECTION INTRAMUSCULAR; INTRAVENOUS; SUBCUTANEOUS at 01:00

## 2018-05-25 RX ADMIN — GABAPENTIN 300 MILLIGRAM(S): 400 CAPSULE ORAL at 05:48

## 2018-05-25 RX ADMIN — Medication 325 MILLIGRAM(S): at 05:48

## 2018-05-25 RX ADMIN — Medication 1 TABLET(S): at 05:48

## 2018-05-25 RX ADMIN — HYDROMORPHONE HYDROCHLORIDE 6 MILLIGRAM(S): 2 INJECTION INTRAMUSCULAR; INTRAVENOUS; SUBCUTANEOUS at 00:01

## 2018-05-25 RX ADMIN — HYDROMORPHONE HYDROCHLORIDE 6 MILLIGRAM(S): 2 INJECTION INTRAMUSCULAR; INTRAVENOUS; SUBCUTANEOUS at 10:07

## 2018-05-25 NOTE — PROGRESS NOTE ADULT - PROBLEM SELECTOR PLAN 7
-c/w nicotine patch

## 2018-05-25 NOTE — PROGRESS NOTE ADULT - PROBLEM SELECTOR PROBLEM 9
Need for prophylactic measure
Need for prophylactic measure
Alcohol abuse
Need for prophylactic measure

## 2018-05-25 NOTE — DISCHARGE NOTE ADULT - CONDITIONS AT DISCHARGE
Pt. is afebrile and offers no complaints. In no acute distress. Right hip dressing: clean, dry and intact. Pt is ambulating with a walker, tolerating diet well, and voiding in adequate amounts.

## 2018-05-25 NOTE — PROGRESS NOTE ADULT - PROBLEM SELECTOR PLAN 4
-c/w spiriva
-c/w spiriva  - no current respiratory distres
- no current respiratory distress - cont spiriva
-c/w spiriva
-c/w spiriva
-c/w spiriva  - no current respiratory distres
cont spiriva

## 2018-05-25 NOTE — DISCHARGE NOTE ADULT - MEDICATION SUMMARY - MEDICATIONS TO STOP TAKING
I will STOP taking the medications listed below when I get home from the hospital:    lisinopril 20 mg oral tablet  -- 1 tab(s) by mouth once a day    Percocet 5/325 325 mg-5 mg oral tablet  -- 1 tab(s) by mouth every 6 hours    metoprolol tartrate 25 mg oral tablet  -- 1 tab(s) by mouth 2 times a day    Duricef  -- 500 milligram(s) by mouth 2 times a day  -- stop after 5 days    Valium 2 mg oral tablet  --  by mouth once a day (at bedtime)    Fioricet  --  by mouth    metoprolol  --  by mouth    lisinopril  --  by mouth    lisinopril 5 mg oral tablet  -- 1 tab(s) by mouth once a day    metoprolol tartrate 25 mg oral tablet  -- 1 tab(s) by mouth 2 times a day    traZODone 100 mg oral tablet  -- 1 tab(s) by mouth once a day (at bedtime), As needed, insomnia    clonazePAM 1 mg oral tablet  -- 1 tab(s) by mouth 2 times a day MDD:2  -- Caution federal law prohibits the transfer of this drug to any person other  than the person for whom it was prescribed.  Do not drink alcoholic beverages when taking this medication.  Do not take this drug if you are pregnant.  It is very important that you take or use this exactly as directed.  Do not skip doses or discontinue unless directed by your doctor.  May cause drowsiness.  Alcohol may intensify this effect.  Use care when operating dangerous machinery.  Obtain medical advice before taking any non-prescription drugs as some may affect the action of this medication.  This drug may impair the ability to drive or operate machinery.  Use care until you become familiar with its effects.    acetaminophen-codeine 300 mg-60 mg oral tablet  -- 1 tab(s) by mouth every 4 hours, As Needed for pain    carvedilol 12.5 mg oral tablet  -- 1 tab(s) by mouth 2 times a day    ibuprofen 600 mg oral tablet  -- 1 tab(s) by mouth every 6 hours, As Needed    traMADol 50 mg oral tablet  -- 1 tab(s) by mouth every 8 hours, As Needed

## 2018-05-25 NOTE — DISCHARGE NOTE ADULT - CARE PLAN
Principal Discharge DX:	Avascular necrosis of femur head, right  Goal:	pain control, surgical site healing, ambulation, return to ADL's  Assessment and plan of treatment:	Pt is s/p removal of hardware and conversion to Right USAMA on 5/21/18  without any intraoperative complications.  Pt is doing well and stable for discharge.  Pt is tolerating physical therapy: WBAT with cane/walker, POSTERIOR TOTAL HIP PRECAUTIONS, gait training.  Leave dressing on until post op office visit(POD#14).  Have sutures/staples removed POD#14 if present.  Pt is on enteric coated ASA 325mg PO BID for DVT prophylaxis take for 6 weeks unless otherwise directed by surgeon.  follow up with Dr. Jaime in two weeks.  Follow up with primary care doctor in 1-2 weeks for continuity of care. Principal Discharge DX:	Avascular necrosis of femur head, right  Goal:	pain control, surgical site healing, ambulation, return to ADL's  Assessment and plan of treatment:	Pt is s/p removal of hardware and conversion to Right USAMA on 5/21/18  without any intraoperative complications.  Pt is doing well and stable for discharge.  Pt is tolerating physical therapy: WBAT with cane/walker, POSTERIOR TOTAL HIP PRECAUTIONS, gait training.  Leave dressing on until post op office visit(POD#14).  Have sutures/staples removed POD#14 if present.  Pt is on enteric coated ASA 325mg PO BID for DVT prophylaxis take for 6 weeks unless otherwise directed by surgeon.  follow up with Dr. Jaime in two weeks.  Follow up with primary care doctor in 1-2 weeks for continuity of care, cardiac medications have been adjusted as per Dr. aCbrera of cardiology.

## 2018-05-25 NOTE — PROGRESS NOTE ADULT - ASSESSMENT
72F h/o R femoral head AVN s/p CRPP at [facility] 10/17, CAD s/p multiple stents (remote), HTN, HLD, EtOH abuse, anxiety, COPD, current smoker, who is a community ambulator w/o assistive devices presents c/o months of progressively worsening R hip pain found to have on XRAY femoral head necrosis with collapse and backing out of the screws awaiting USAMA today.
72F h/o Right femoral head AVN s/p Closed reduction, percutaneous pinning at [facility] in October, 2017, CAD s/p multiple stents (remote), HTN, HLD, h/o EtOH abuse, anxiety, COPD, current smoker, who is a community ambulator w/o assistive devices presents c/o months of progressively worsening R hip pain found to have femoral head necrosis with collapse and backing out of the screws awaiting USAMA
72F h/o R femoral head AVN s/p CRPP at [facility] 10/17, CAD s/p multiple stents (remote), HTN, HLD, EtOH abuse, anxiety, COPD, current smoker, who is a community ambulator w/o assistive devices presents c/o months of progressively worsening R hip pain found to have on XRAY femoral head necrosis with collapse and backing out of the screws awaiting USAMA this week.
72F h/o R femoral head AVN s/p CRPP at [facility] 10/17, CAD s/p multiple stents (remote), HTN, HLD, EtOH abuse, anxiety, COPD, current smoker, who is a community ambulator w/o assistive devices presents c/o months of progressively worsening R hip pain found to have on XRAY femoral head necrosis with collapse and backing out of the screws awaiting USAMA
72F h/o Right femoral head AVN s/p Closed reduction, percutaneous pinning at [facility] in October, 2017, CAD s/p multiple stents (remote), HTN, HLD, h/o EtOH abuse, anxiety, COPD, current smoker, who is a community ambulator w/o assistive devices presents c/o months of progressively worsening R hip pain found to have femoral head necrosis with collapse and backing out of the screws now s/p R USAMA on 5/21/18
72F h/o Right femoral head AVN s/p Closed reduction, percutaneous pinning at [facility] in October, 2017, CAD s/p multiple stents (remote), HTN, HLD, h/o EtOH abuse, anxiety, COPD, current smoker, who is a community ambulator w/o assistive devices presents c/o months of progressively worsening R hip pain found to have femoral head necrosis with collapse and backing out of the screws now s/p R USAMA on 5/21/18
72F h/o Right femoral head AVN s/p Closed reduction, percutaneous pinning at [facility] in October, 2017, CAD s/p multiple stents (remote), HTN, HLD, h/o EtOH abuse, anxiety, COPD, current smoker, who is a community ambulator w/o assistive devices presents c/o months of progressively worsening R hip pain found to have femoral head necrosis with collapse and backing out of the screws now s/p R USAMA on 5/21/18
cad   history stent  cont statin  asa    htn  better controlled  cont current meds
cad   history stent  cont statin  asa    htn  better controlled  cont current meds    PreOp  Based on current ACC/AHA guidelines, patient history and physical exam, the patient is considered to have high risk  no objection to proceed to OR as planned
cad   history stent  cont statin  asa    htn  better controlled  cont current meds    PreOp  Based on current ACC/AHA guidelines, patient history and physical exam, the patient is considered to have high risk  no objection to proceed to OR as planned
cad   history stent  cont statin  asa    htn  change coreg to labetalol     PreOp  Based on current ACC/AHA guidelines, patient history and physical exam, the patient is considered to have high risk  no objection to proceed to OR as planned
cad   history stent  cont statin  asa    htn  stable
cad   history stent  cont statin  asa    htn  stable    HTN  cont current meds  better controlled     PreOp  Based on current ACC/AHA guidelines, patient history and physical exam, the patient is considered to have high risk  no objection to proceed to OR as planned
cad   history stent  cont statin  asa    htn  stable    HTN  cont current meds  would change coreg to labetalol 200 TID    PreOp  Based on current ACC/AHA guidelines, patient history and physical exam, the patient is considered to have high risk  given GRAY will get echo and stress test
cad   history stent  cont statin  asa    htn  stable  cont current meds
cad   history stent  cont statin  asa    htn  stable  cont current meds     HTN  cont current meds  better controlled     PreOp  Based on current ACC/AHA guidelines, patient history and physical exam, the patient is considered to have high risk  no objection to proceed to OR as planned
cad   history stent  cont statin  asa    htn  stable  cont current meds   fu labs
72F h/o R femoral head AVN s/p CRPP at [facility] 10/17, CAD s/p multiple stents (remote), HTN, HLD, EtOH abuse, anxiety, COPD, current smoker, who is a community ambulator w/o assistive devices presents c/o months of progressively worsening R hip pain found to have on XRAY femoral head necrosis with collapse and backing out of the screws awaiting USAMA this week.
72F h/o R femoral head AVN s/p CRPP at [facility] 10/17, CAD s/p multiple stents (remote), HTN, HLD, EtOH abuse, anxiety, COPD, current smoker, who is a community ambulator w/o assistive devices presents c/o months of progressively worsening R hip pain found to have on XRAY femoral head necrosis with collapse and backing out of the screws awaiting USAMA

## 2018-05-25 NOTE — PROGRESS NOTE ADULT - PROBLEM SELECTOR PROBLEM 8
Alcohol abuse
Insomnia, unspecified type
Alcohol abuse
Alcohol abuse
Insomnia, unspecified type

## 2018-05-25 NOTE — PROGRESS NOTE ADULT - SUBJECTIVE AND OBJECTIVE BOX
CHIEF COMPLAINT: f/u     SUBJECTIVE / OVERNIGHT EVENTS:   Patient seen and examined. No acute events overnight. Still complains of pain. Hoping the surgery will improve her pain.    MEDICATIONS  (STANDING):  calcium carbonate 1250 mG + Vitamin D (OsCal 500 + D) 1 Tablet(s) Oral two times a day  docusate sodium 100 milliGRAM(s) Oral three times a day  gabapentin 200 milliGRAM(s) Oral three times a day  hydrALAZINE 50 milliGRAM(s) Oral every 8 hours  hydrALAZINE Injectable 5 milliGRAM(s) IV Push once  labetalol 200 milliGRAM(s) Oral three times a day  lidocaine   Patch 1 Patch Transdermal daily  melatonin 6 milliGRAM(s) Oral at bedtime  nicotine - 21 mG/24Hr(s) Patch 1 patch Transdermal daily  NIFEdipine XL 90 milliGRAM(s) Oral daily  QUEtiapine 100 milliGRAM(s) Oral at bedtime  senna 2 Tablet(s) Oral at bedtime  sodium chloride 0.9%. 1000 milliLiter(s) (125 mL/Hr) IV Continuous <Continuous>  traMADol 50 milliGRAM(s) Oral every 8 hours    MEDICATIONS  (PRN):  acetaminophen   Tablet. 650 milliGRAM(s) Oral every 6 hours PRN Mild Pain (1 - 3)  ALBUTerol    90 MICROgram(s) HFA Inhaler 2 Puff(s) Inhalation every 6 hours PRN Shortness of Breath and/or Wheezing  HYDROmorphone   Tablet 4 milliGRAM(s) Oral every 3 hours PRN Moderate Pain (4 - 6)  HYDROmorphone   Tablet 6 milliGRAM(s) Oral every 6 hours PRN Severe Pain (7 - 10)  morphine  - Injectable 2 milliGRAM(s) IV Push every 3 hours PRN breakthrough  QUEtiapine 50 milliGRAM(s) Oral every 6 hours PRN anxiety/insomnia  tiotropium 18 MICROgram(s) Capsule 1 Capsule(s) Inhalation daily PRN shortness of breath or wheezing  tiZANidine 2 milliGRAM(s) Oral every 6 hours PRN muscle spasm      VITALS:  T(F): 98.6 (05-21-18 @ 13:31), Max: 98.6 (05-21-18 @ 04:36)  HR: 54 (05-21-18 @ 13:31) (54 - 75)  BP: 150/58 (05-21-18 @ 13:31) (104/45 - 150/58)  RR: 16 (05-21-18 @ 13:31) (16 - 17)  SpO2: 100% (05-21-18 @ 13:31)  Wt(kg): --  Height (cm): 157.4 (13:27)  Weight (kg): 51.7 (04:36)  BMI (kg/m2): 20.9 (13:27)      PHYSICAL EXAM:  GENERAL: NAD, well-developed  HENT: EOMI, PERRLA, conjunctiva and sclera clear  NECK: Supple, No JVD  CHEST/LUNG: Clear to auscultation bilaterally; No wheeze  HEART: Regular rate and rhythm; Grade II/VI holosystolic apical murmur.  ABDOMEN: Soft, Nontender, Nondistended; Bowel sounds present  EXTREMITIES:  2+ Peripheral Pulses, No clubbing, cyanosis or edema  PSYCH: AAOx3, anxious  NEUROLOGY: non-focal  SKIN: No rashes or lesions        LABS:              11.9                 141  | 29   | 26           6.19  >-----------< 230     ------------------------< 87                    37.3                 5.2  | 101  | 0.71                                         Ca 9.0   Mg x     Ph x           TPro  7.1  /  Alb  3.9      TBili  0.3  /  DBili  x         AST  12  /  ALT  11            AlkPhos  64      INR: 0.96 ;    PT: 11.0 SEC;    PTT: 30.9 SEC        [ ] Consultant(s) Notes Reviewed:  [x] Care Discussed with Consultants/Other Providers: Orthopedic PA - discussed plan for OR
CHIEF COMPLAINT: f/u right hip pain    SUBJECTIVE / OVERNIGHT EVENTS: Patient seen and examined. No acute events overnight. Pain well controlled and patient without any complaints.    MEDICATIONS  (STANDING):  aspirin enteric coated 81 milliGRAM(s) Oral daily  calcium carbonate 1250 mG + Vitamin D (OsCal 500 + D) 1 Tablet(s) Oral two times a day  carvedilol 12.5 milliGRAM(s) Oral every 12 hours  docusate sodium 100 milliGRAM(s) Oral three times a day  gabapentin 200 milliGRAM(s) Oral three times a day  heparin  Injectable 5000 Unit(s) SubCutaneous every 8 hours  hydrALAZINE 50 milliGRAM(s) Oral every 8 hours  hydrALAZINE Injectable 5 milliGRAM(s) IV Push once  lidocaine   Patch 1 Patch Transdermal daily  melatonin 6 milliGRAM(s) Oral at bedtime  nicotine - 21 mG/24Hr(s) Patch 1 patch Transdermal daily  NIFEdipine XL 90 milliGRAM(s) Oral daily  senna 2 Tablet(s) Oral at bedtime  sodium chloride 0.9%. 1000 milliLiter(s) (100 mL/Hr) IV Continuous <Continuous>  tiZANidine 2 milliGRAM(s) Oral every 6 hours  traMADol 50 milliGRAM(s) Oral every 8 hours    MEDICATIONS  (PRN):  acetaminophen   Tablet. 650 milliGRAM(s) Oral every 6 hours PRN Mild Pain (1 - 3)  ALBUTerol    90 MICROgram(s) HFA Inhaler 2 Puff(s) Inhalation every 6 hours PRN Shortness of Breath and/or Wheezing  HYDROmorphone   Tablet 4 milliGRAM(s) Oral every 3 hours PRN Moderate Pain (4 - 6)  HYDROmorphone   Tablet 6 milliGRAM(s) Oral every 6 hours PRN Severe Pain (7 - 10)  morphine  - Injectable 2 milliGRAM(s) IV Push every 3 hours PRN breakthrough  QUEtiapine 50 milliGRAM(s) Oral every 6 hours PRN anxiety/insomnia  tiotropium 18 MICROgram(s) Capsule 1 Capsule(s) Inhalation daily PRN shortness of breath or wheezing    VITALS:  T(F): 97.9 (05-18-18 @ 10:15), Max: 98.2 (05-17-18 @ 18:09)  HR: 56 (05-18-18 @ 10:15) (52 - 67)  BP: 150/68 (05-18-18 @ 10:15) (125/47 - 152/59)  RR: 17 (05-18-18 @ 10:15) (17 - 18)  SpO2: 96% (05-18-18 @ 10:15)    PHYSICAL EXAM:  GENERAL: NAD, well-groomed, well-developed  RESPIRATORY: few rhonchi  CARDIOVASCULAR: Regular rate and rhythm; 2/6 YOLY   GASTROINTESTINAL: Soft, Nontender, Nondistended; Bowel sounds present  EXTREMITIES:  2+ Peripheral Pulses, No clubbing, cyanosis, or edema  NERVOUS SYSTEM:  Alert & Oriented X3; Moving all 4 extremities; No gross sensory deficits    LABS:              12.2                 139  | 30   | 28           5.56  >-----------< 269     ------------------------< 93                    38.0                 4.4  | 99   | 0.85                                         Ca 8.9   Mg x     Ph x        INR: 0.94 ;    PT: 10.8 SEC;    PTT: 29.6 SEC    RADIOLOGY & ADDITIONAL TESTS:  Imaging Personally Reviewed: [x] Yes  < from: Xray Femur 2 Views, Right (05.18.18 @ 09:36) >  IMPRESSION:  3 back tracked parallel cannulated compression screws again seen   traversing the right femoral head neck and trochanteric regions. A washer   is present in association with the middle screw.    The femoral head is again noted to be impacted into the neck. No   dislocations or additional fractures.    Preserved right hip and knee joint spaces.    No discrete lytic or blastic lesions.    < end of copied text >    [x] Consultant(s) Notes Reviewed: Cardiology  [x] Care Discussed with Consultants/Other Providers: Orthopedic PA - discussed plan for surgery
Anesthesia Pain Management Service    SUBJECTIVE: I didn't know I wasn't pushing the pump     Pain Scale Score	At rest: __10_ 	With Activity: ___ 	[X ] Refer to charted pain scores    THERAPY:    [ ] IV PCA Morphine		[ ] 5 mg/mL	[ ] 1 mg/mL  [X ] IV PCA Hydromorphone	[ ] 5 mg/mL	[X ] 1 mg/mL  [ ] IV PCA Fentanyl		[ ] 50 micrograms/mL    Demand dose __0.2_ lockout __6_ (minutes) Continuous Rate _0__ Total: _13.1__   mg used (in past 24 hrs)      MEDICATIONS  (STANDING):  acetaminophen   Tablet 650 milliGRAM(s) Oral every 8 hours  aspirin enteric coated 325 milliGRAM(s) Oral two times a day  calcium carbonate 1250 mG + Vitamin D (OsCal 500 + D) 1 Tablet(s) Oral two times a day  docusate sodium 100 milliGRAM(s) Oral three times a day  gabapentin 200 milliGRAM(s) Oral three times a day  hydrALAZINE 50 milliGRAM(s) Oral every 8 hours  hydrALAZINE Injectable 5 milliGRAM(s) IV Push once  ketorolac   Injectable 15 milliGRAM(s) IV Push every 6 hours  labetalol 200 milliGRAM(s) Oral three times a day  lactated ringers. 1000 milliLiter(s) (75 mL/Hr) IV Continuous <Continuous>  melatonin 6 milliGRAM(s) Oral at bedtime  nicotine - 21 mG/24Hr(s) Patch 1 patch Transdermal daily  NIFEdipine XL 90 milliGRAM(s) Oral daily  pantoprazole    Tablet 40 milliGRAM(s) Oral daily  polyethylene glycol 3350 17 Gram(s) Oral daily  QUEtiapine 100 milliGRAM(s) Oral at bedtime    MEDICATIONS  (PRN):  acetaminophen   Tablet 650 milliGRAM(s) Oral every 6 hours PRN For Temp over 38.3 C (100.94 F)  acetaminophen   Tablet. 650 milliGRAM(s) Oral every 6 hours PRN Mild Pain (1 - 3)  ALBUTerol    90 MICROgram(s) HFA Inhaler 2 Puff(s) Inhalation every 6 hours PRN Shortness of Breath and/or Wheezing  aluminum hydroxide/magnesium hydroxide/simethicone Suspension 30 milliLiter(s) Oral four times a day PRN Indigestion  bisacodyl Suppository 10 milliGRAM(s) Rectal daily PRN If no bowel movement by POD#2  HYDROmorphone   Tablet 4 milliGRAM(s) Oral every 3 hours PRN Moderate Pain (4 - 6)  HYDROmorphone   Tablet 6 milliGRAM(s) Oral every 3 hours PRN Severe Pain (7 - 10)  magnesium hydroxide Suspension 30 milliLiter(s) Oral two times a day PRN Constipation  ondansetron Injectable 4 milliGRAM(s) IV Push every 6 hours PRN Nausea and/or Vomiting  QUEtiapine 50 milliGRAM(s) Oral every 6 hours PRN anxiety/insomnia  senna 2 Tablet(s) Oral at bedtime PRN Constipation  tiotropium 18 MICROgram(s) Capsule 1 Capsule(s) Inhalation daily PRN shortness of breath or wheezing  tiZANidine 2 milliGRAM(s) Oral every 6 hours PRN muscle spasm      OBJECTIVE: Pt. laying in bed     Sedation Score:	[ X] Alert	[ ] Drowsy 	[ ] Arousable	[ ] Asleep	[ ] Unresponsive    Side Effects:	[X ] None	[ ] Nausea	[ ] Vomiting	[ ] Pruritus  		[ ] Other:    Vital Signs Last 24 Hrs  T(C): 36.4 (23 May 2018 05:43), Max: 36.9 (22 May 2018 13:48)  T(F): 97.6 (23 May 2018 05:43), Max: 98.4 (22 May 2018 13:48)  HR: 63 (23 May 2018 05:43) (63 - 82)  BP: 140/52 (23 May 2018 05:43) (108/42 - 140/52)  BP(mean): --  RR: 17 (23 May 2018 05:43) (17 - 18)  SpO2: 99% (23 May 2018 05:43) (96% - 100%)    ASSESSMENT/ PLAN    Therapy to  be:	[ ] Continue   [ X] Discontinued   [X ] Change to prn Analgesics    Documentation and Verification of current medications:   [X] Done	[ ] Not done, not elligible    Comments: PRN Oral/IV opioids and/or Adjuvant medication to be ordered at this point.
Anesthesia Pain Management Service- Attending Addendum    SUBJECTIVE: Patient's pain control adequate    Therapy:	  [ X] IV PCA	   [ ] Epidural           [ ] s/p Spinal Opoid              [ ] Postpartum infusion	  [ ] Patient controlled regional anesthesia (PCRA)    [ ] prn Analgesics    Allergies    No Known Allergies    Intolerances      MEDICATIONS  (STANDING):  acetaminophen   Tablet 650 milliGRAM(s) Oral every 8 hours  aspirin enteric coated 325 milliGRAM(s) Oral two times a day  calcium carbonate 1250 mG + Vitamin D (OsCal 500 + D) 1 Tablet(s) Oral two times a day  docusate sodium 100 milliGRAM(s) Oral three times a day  gabapentin 200 milliGRAM(s) Oral three times a day  hydrALAZINE 50 milliGRAM(s) Oral every 8 hours  hydrALAZINE Injectable 5 milliGRAM(s) IV Push once  ketorolac   Injectable 15 milliGRAM(s) IV Push every 6 hours  labetalol 200 milliGRAM(s) Oral three times a day  lactated ringers. 1000 milliLiter(s) (75 mL/Hr) IV Continuous <Continuous>  melatonin 6 milliGRAM(s) Oral at bedtime  nicotine - 21 mG/24Hr(s) Patch 1 patch Transdermal daily  NIFEdipine XL 90 milliGRAM(s) Oral daily  pantoprazole    Tablet 40 milliGRAM(s) Oral daily  polyethylene glycol 3350 17 Gram(s) Oral daily  QUEtiapine 100 milliGRAM(s) Oral at bedtime    MEDICATIONS  (PRN):  acetaminophen   Tablet 650 milliGRAM(s) Oral every 6 hours PRN For Temp over 38.3 C (100.94 F)  acetaminophen   Tablet. 650 milliGRAM(s) Oral every 6 hours PRN Mild Pain (1 - 3)  ALBUTerol    90 MICROgram(s) HFA Inhaler 2 Puff(s) Inhalation every 6 hours PRN Shortness of Breath and/or Wheezing  aluminum hydroxide/magnesium hydroxide/simethicone Suspension 30 milliLiter(s) Oral four times a day PRN Indigestion  bisacodyl Suppository 10 milliGRAM(s) Rectal daily PRN If no bowel movement by POD#2  HYDROmorphone   Tablet 4 milliGRAM(s) Oral every 3 hours PRN Moderate Pain (4 - 6)  HYDROmorphone   Tablet 6 milliGRAM(s) Oral every 3 hours PRN Severe Pain (7 - 10)  magnesium hydroxide Suspension 30 milliLiter(s) Oral two times a day PRN Constipation  ondansetron Injectable 4 milliGRAM(s) IV Push every 6 hours PRN Nausea and/or Vomiting  QUEtiapine 50 milliGRAM(s) Oral every 6 hours PRN anxiety/insomnia  senna 2 Tablet(s) Oral at bedtime PRN Constipation  tiotropium 18 MICROgram(s) Capsule 1 Capsule(s) Inhalation daily PRN shortness of breath or wheezing  tiZANidine 2 milliGRAM(s) Oral every 6 hours PRN muscle spasm      OBJECTIVE:   [X] No new signs     [ ] Other:    Side Effects:  [X ] None			[ ] Other:      ASSESSMENT/PLAN  -Discontinue current therapy    [ ] Therapy changed to:    [ ] IV PCA       [ ] Epidural     [ X] prn Analgesics     Comments: Pain management per primary team, APS to sign off
Anesthesia Pain Management Service- Attending Addendum    SUBJECTIVE: Pt says PCA working- requesting multiple opioids and benzodiazepies.  Patient reports having taken preop however Istop does not indicate this.  Will work with team and pyschiatry- plan to transition off PCA tomorrow.     Therapy:	  [ X] IV PCA	   [ ] Epidural           [ ] s/p Spinal Opoid              [ ] Postpartum infusion	  [ ] Patient controlled regional anesthesia (PCRA)    [ ] prn Analgesics    Allergies    No Known Allergies    Intolerances      MEDICATIONS  (STANDING):  acetaminophen   Tablet 650 milliGRAM(s) Oral every 8 hours  aspirin enteric coated 325 milliGRAM(s) Oral two times a day  calcium carbonate 1250 mG + Vitamin D (OsCal 500 + D) 1 Tablet(s) Oral two times a day  docusate sodium 100 milliGRAM(s) Oral three times a day  gabapentin 200 milliGRAM(s) Oral three times a day  hydrALAZINE 50 milliGRAM(s) Oral every 8 hours  hydrALAZINE Injectable 5 milliGRAM(s) IV Push once  HYDROmorphone PCA (1 mG/mL) 30 milliLiter(s) PCA Continuous PCA Continuous  ketorolac   Injectable 15 milliGRAM(s) IV Push every 6 hours  labetalol 200 milliGRAM(s) Oral three times a day  lactated ringers. 1000 milliLiter(s) (75 mL/Hr) IV Continuous <Continuous>  melatonin 6 milliGRAM(s) Oral at bedtime  nicotine - 21 mG/24Hr(s) Patch 1 patch Transdermal daily  NIFEdipine XL 90 milliGRAM(s) Oral daily  pantoprazole    Tablet 40 milliGRAM(s) Oral daily  polyethylene glycol 3350 17 Gram(s) Oral daily  QUEtiapine 100 milliGRAM(s) Oral at bedtime    MEDICATIONS  (PRN):  acetaminophen   Tablet 650 milliGRAM(s) Oral every 6 hours PRN For Temp over 38.3 C (100.94 F)  acetaminophen   Tablet. 650 milliGRAM(s) Oral every 6 hours PRN Mild Pain (1 - 3)  ALBUTerol    90 MICROgram(s) HFA Inhaler 2 Puff(s) Inhalation every 6 hours PRN Shortness of Breath and/or Wheezing  aluminum hydroxide/magnesium hydroxide/simethicone Suspension 30 milliLiter(s) Oral four times a day PRN Indigestion  diphenhydrAMINE   Injectable 12.5 milliGRAM(s) IV Push every 4 hours PRN Pruritus  HYDROmorphone PCA (1 mG/mL) Rescue Clinician Bolus 0.5 milliGRAM(s) IV Push every 15 minutes PRN for Pain Scale GREATER THAN 6  magnesium hydroxide Suspension 30 milliLiter(s) Oral two times a day PRN Constipation  naloxone Injectable 0.1 milliGRAM(s) IV Push every 3 minutes PRN For ANY of the following changes in patient status:  A. RR LESS THAN 10 breaths per minute, B. Oxygen saturation LESS THAN 90%, C. Sedation score of 6  ondansetron Injectable 4 milliGRAM(s) IV Push every 6 hours PRN Nausea  ondansetron Injectable 4 milliGRAM(s) IV Push every 6 hours PRN Nausea and/or Vomiting  QUEtiapine 50 milliGRAM(s) Oral every 6 hours PRN anxiety/insomnia  senna 2 Tablet(s) Oral at bedtime PRN Constipation  tiotropium 18 MICROgram(s) Capsule 1 Capsule(s) Inhalation daily PRN shortness of breath or wheezing  tiZANidine 2 milliGRAM(s) Oral every 6 hours PRN muscle spasm      OBJECTIVE:   [X] No new signs     [ ] Other:    Side Effects:  [X ] None			[ ] Other:    Assessment of Catheter Site:		[ ] Intact		[ ] Other:    ASSESSMENT/PLAN  [ X] Continue current therapy    [ ] Therapy changed to:    [ ] IV PCA       [ ] Epidural     [ ] prn Analgesics     Comments:
CHIEF COMPLAINT: Patient is a 72y old  Female who presents with a chief complaint of R hip pain impairing her ability to ambulate (14 May 2018 18:57)      SUBJECTIVE / OVERNIGHT EVENTS:   Patient seen and examined. Patient s/p R USAMA, POD 1.  Patient c/o pain and feels that PCA is not working.    MEDICATIONS  (STANDING):  acetaminophen   Tablet 650 milliGRAM(s) Oral every 8 hours  aspirin enteric coated 325 milliGRAM(s) Oral two times a day  calcium carbonate 1250 mG + Vitamin D (OsCal 500 + D) 1 Tablet(s) Oral two times a day  docusate sodium 100 milliGRAM(s) Oral three times a day  gabapentin 200 milliGRAM(s) Oral three times a day  hydrALAZINE 50 milliGRAM(s) Oral every 8 hours  hydrALAZINE Injectable 5 milliGRAM(s) IV Push once  HYDROmorphone PCA (1 mG/mL) 30 milliLiter(s) PCA Continuous PCA Continuous  ketorolac   Injectable 15 milliGRAM(s) IV Push every 6 hours  labetalol 200 milliGRAM(s) Oral three times a day  lactated ringers. 1000 milliLiter(s) (75 mL/Hr) IV Continuous <Continuous>  melatonin 6 milliGRAM(s) Oral at bedtime  nicotine - 21 mG/24Hr(s) Patch 1 patch Transdermal daily  NIFEdipine XL 90 milliGRAM(s) Oral daily  pantoprazole    Tablet 40 milliGRAM(s) Oral daily  polyethylene glycol 3350 17 Gram(s) Oral daily  QUEtiapine 100 milliGRAM(s) Oral at bedtime    MEDICATIONS  (PRN):  acetaminophen   Tablet 650 milliGRAM(s) Oral every 6 hours PRN For Temp over 38.3 C (100.94 F)  acetaminophen   Tablet. 650 milliGRAM(s) Oral every 6 hours PRN Mild Pain (1 - 3)  ALBUTerol    90 MICROgram(s) HFA Inhaler 2 Puff(s) Inhalation every 6 hours PRN Shortness of Breath and/or Wheezing  aluminum hydroxide/magnesium hydroxide/simethicone Suspension 30 milliLiter(s) Oral four times a day PRN Indigestion  diphenhydrAMINE   Injectable 12.5 milliGRAM(s) IV Push every 4 hours PRN Pruritus  HYDROmorphone PCA (1 mG/mL) Rescue Clinician Bolus 0.5 milliGRAM(s) IV Push every 15 minutes PRN for Pain Scale GREATER THAN 6  magnesium hydroxide Suspension 30 milliLiter(s) Oral two times a day PRN Constipation  naloxone Injectable 0.1 milliGRAM(s) IV Push every 3 minutes PRN For ANY of the following changes in patient status:  A. RR LESS THAN 10 breaths per minute, B. Oxygen saturation LESS THAN 90%, C. Sedation score of 6  ondansetron Injectable 4 milliGRAM(s) IV Push every 6 hours PRN Nausea  ondansetron Injectable 4 milliGRAM(s) IV Push every 6 hours PRN Nausea and/or Vomiting  QUEtiapine 50 milliGRAM(s) Oral every 6 hours PRN anxiety/insomnia  senna 2 Tablet(s) Oral at bedtime PRN Constipation  tiotropium 18 MICROgram(s) Capsule 1 Capsule(s) Inhalation daily PRN shortness of breath or wheezing  tiZANidine 2 milliGRAM(s) Oral every 6 hours PRN muscle spasm      VITALS:  T(F): 98.4 (05-22-18 @ 13:48), Max: 98.6 (05-21-18 @ 19:15)  HR: 79 (05-22-18 @ 15:26) (60 - 79)  BP: 113/95 (05-22-18 @ 15:26) (113/95 - 164/62)  RR: 18 (05-22-18 @ 13:48) (18 - 22)  SpO2: 100% (05-22-18 @ 13:48)      PHYSICAL EXAM:  GENERAL: NAD, well-developed  HENT: EOMI, PERRLA, conjunctiva and sclera clear  NECK: Supple, No JVD  CHEST/LUNG: Clear to auscultation bilaterally; No wheeze  HEART: Regular rate and rhythm; Grade II/VI holosystolic apical murmur.  ABDOMEN: Soft, Nontender, Nondistended; Bowel sounds present  EXTREMITIES:  2+ Peripheral Pulses, No clubbing, cyanosis or edema. Dressing c/d/i  PSYCH: AAOx3, anxious  NEUROLOGY: non-focal  SKIN: No rashes or lesions    LABS:              10.7                 138  | 22   | 27           10.91 >-----------< 189     ------------------------< 162                   33.7                 5.0  | 99   | 0.73                                         Ca 8.1   Mg x     Ph x           TPro  7.1  /  Alb  3.9      TBili  0.3  /  DBili  x         AST  12  /  ALT  11            AlkPhos  64      INR: 1.04 ;    PT: 11.6 SEC;    PTT: 30.3 SEC            RADIOLOGY & ADDITIONAL TESTS:  Imaging Personally Reviewed: [x] Yes    [ ] Consultant(s) Notes Reviewed:  [x] Care Discussed with Consultants/Other Providers: Orthopedic PA - discussed
CHIEF COMPLAINT: Patient is a 72y old  Female who presents with a chief complaint of R hip pain impairing her ability to ambulate (14 May 2018 18:57)    SUBJECTIVE / OVERNIGHT EVENTS:   Patient seen and examined. No acute events overnight.   Patient still with c/o pain. Patient is upset that the PCA was discontinued, though she acknowledges that yesterday she had asked to have it d/c'd.  Anxious about her 's health (also hospitalized).    MEDICATIONS  (STANDING):  aspirin enteric coated 325 milliGRAM(s) Oral two times a day  calcium carbonate 1250 mG + Vitamin D (OsCal 500 + D) 1 Tablet(s) Oral two times a day  docusate sodium 100 milliGRAM(s) Oral three times a day  gabapentin 300 milliGRAM(s) Oral three times a day  hydrALAZINE 50 milliGRAM(s) Oral every 8 hours  hydrALAZINE Injectable 5 milliGRAM(s) IV Push once  ketorolac   Injectable 15 milliGRAM(s) IV Push every 6 hours  labetalol 200 milliGRAM(s) Oral three times a day  lactated ringers. 1000 milliLiter(s) (75 mL/Hr) IV Continuous <Continuous>  melatonin 6 milliGRAM(s) Oral at bedtime  nicotine - 21 mG/24Hr(s) Patch 1 patch Transdermal daily  NIFEdipine XL 90 milliGRAM(s) Oral daily  pantoprazole    Tablet 40 milliGRAM(s) Oral daily  polyethylene glycol 3350 17 Gram(s) Oral daily  QUEtiapine 100 milliGRAM(s) Oral at bedtime    MEDICATIONS  (PRN):  acetaminophen   Tablet 650 milliGRAM(s) Oral every 6 hours PRN For Temp over 38.3 C (100.94 F)  acetaminophen   Tablet. 650 milliGRAM(s) Oral every 6 hours PRN Mild Pain (1 - 3)  ALBUTerol    90 MICROgram(s) HFA Inhaler 2 Puff(s) Inhalation every 6 hours PRN Shortness of Breath and/or Wheezing  aluminum hydroxide/magnesium hydroxide/simethicone Suspension 30 milliLiter(s) Oral four times a day PRN Indigestion  bisacodyl Suppository 10 milliGRAM(s) Rectal daily PRN If no bowel movement by POD#2  HYDROmorphone   Tablet 4 milliGRAM(s) Oral every 3 hours PRN Moderate Pain (4 - 6)  HYDROmorphone   Tablet 6 milliGRAM(s) Oral every 3 hours PRN Severe Pain (7 - 10)  magnesium hydroxide Suspension 30 milliLiter(s) Oral two times a day PRN Constipation  ondansetron Injectable 4 milliGRAM(s) IV Push every 6 hours PRN Nausea and/or Vomiting  QUEtiapine 50 milliGRAM(s) Oral every 6 hours PRN anxiety/insomnia  senna 2 Tablet(s) Oral at bedtime PRN Constipation  tiotropium 18 MICROgram(s) Capsule 1 Capsule(s) Inhalation daily PRN shortness of breath or wheezing  tiZANidine 2 milliGRAM(s) Oral every 6 hours PRN muscle spasm      VITALS:  T(F): 98.4 (05-23-18 @ 13:04), Max: 98.4 (05-23-18 @ 13:04)  HR: 74 (05-23-18 @ 13:04) (63 - 83)  BP: 120/48 (05-23-18 @ 13:04) (111/41 - 140/52)  RR: 18 (05-23-18 @ 13:04) (17 - 18)  SpO2: 98% (05-23-18 @ 13:04)      PHYSICAL EXAM:  GENERAL: NAD, well-developed  HENT: EOMI, PERRLA, conjunctiva and sclera clear  NECK: Supple, No JVD  CHEST/LUNG: Clear to auscultation bilaterally; No wheeze  HEART: Regular rate and rhythm; Grade II/VI holosystolic apical murmur.  ABDOMEN: Soft, Nontender, Nondistended; Bowel sounds present  EXTREMITIES:  2+ Peripheral Pulses, No clubbing, cyanosis or edema. Dressing c/d/i  PSYCH: AAOx3, anxious  NEUROLOGY: non-focal  SKIN: No rashes or lesions  LABS:              10.7                 138  | 22   | 27           10.91 >-----------< 189     ------------------------< 162                   33.7                 5.0  | 99   | 0.73                                         Ca 8.1   Mg x     Ph x            INR: 1.04 ;    PT: 11.6 SEC;    PTT: 30.3 SEC            RADIOLOGY & ADDITIONAL TESTS:  Imaging Personally Reviewed: [x] Yes    [ ] Consultant(s) Notes Reviewed:  [x] Care Discussed with Consultants/Other Providers: Orthopedic PA - discussed
CHIEF COMPLAINT: Patient is a 72y old  Female who presents with a chief complaint of s/p removal of hardware and conversion to Right USAMA (25 May 2018 10:43)      SUBJECTIVE / OVERNIGHT EVENTS:   Patient seen and examined. No acute events overnight. Patient anxious about d/c but feels better overall.    MEDICATIONS  (STANDING):  aspirin enteric coated 325 milliGRAM(s) Oral two times a day  calcium carbonate 1250 mG + Vitamin D (OsCal 500 + D) 1 Tablet(s) Oral two times a day  docusate sodium 100 milliGRAM(s) Oral three times a day  gabapentin 300 milliGRAM(s) Oral three times a day  hydrALAZINE 50 milliGRAM(s) Oral every 8 hours  hydrALAZINE Injectable 5 milliGRAM(s) IV Push once  labetalol 200 milliGRAM(s) Oral three times a day  lactated ringers. 1000 milliLiter(s) (75 mL/Hr) IV Continuous <Continuous>  melatonin 6 milliGRAM(s) Oral at bedtime  nicotine - 21 mG/24Hr(s) Patch 1 patch Transdermal daily  NIFEdipine XL 90 milliGRAM(s) Oral daily  pantoprazole    Tablet 40 milliGRAM(s) Oral daily  polyethylene glycol 3350 17 Gram(s) Oral daily  QUEtiapine 150 milliGRAM(s) Oral at bedtime  QUEtiapine 50 milliGRAM(s) Oral daily    MEDICATIONS  (PRN):  acetaminophen   Tablet 650 milliGRAM(s) Oral every 6 hours PRN For Temp over 38.3 C (100.94 F)  acetaminophen   Tablet. 650 milliGRAM(s) Oral every 6 hours PRN Mild Pain (1 - 3)  ALBUTerol    90 MICROgram(s) HFA Inhaler 2 Puff(s) Inhalation every 6 hours PRN Shortness of Breath and/or Wheezing  aluminum hydroxide/magnesium hydroxide/simethicone Suspension 30 milliLiter(s) Oral four times a day PRN Indigestion  bisacodyl Suppository 10 milliGRAM(s) Rectal daily PRN If no bowel movement by POD#2  HYDROmorphone   Tablet 4 milliGRAM(s) Oral every 3 hours PRN Moderate Pain (4 - 6)  HYDROmorphone   Tablet 6 milliGRAM(s) Oral every 3 hours PRN Severe Pain (7 - 10)  magnesium hydroxide Suspension 30 milliLiter(s) Oral two times a day PRN Constipation  ondansetron Injectable 4 milliGRAM(s) IV Push every 6 hours PRN Nausea and/or Vomiting  QUEtiapine 50 milliGRAM(s) Oral every 6 hours PRN anxiety/insomnia  senna 2 Tablet(s) Oral at bedtime PRN Constipation  tiotropium 18 MICROgram(s) Capsule 1 Capsule(s) Inhalation daily PRN shortness of breath or wheezing  tiZANidine 2 milliGRAM(s) Oral every 6 hours PRN muscle spasm      VITALS:  T(F): 97.2 (05-25-18 @ 09:40), Max: 98.1 (05-24-18 @ 22:45)  HR: 72 (05-25-18 @ 09:40) (65 - 76)  BP: 129/73 (05-25-18 @ 09:40) (102/78 - 129/73)  RR: 17 (05-25-18 @ 09:40) (17 - 18)  SpO2: 100% (05-25-18 @ 09:40)    PHYSICAL EXAM:  GENERAL: NAD, well-developed  HENT: EOMI, PERRLA, conjunctiva and sclera clear  NECK: Supple, No JVD  CHEST/LUNG: Clear to auscultation bilaterally; No wheeze  HEART: Regular rate and rhythm; Grade II/VI holosystolic apical murmur.  ABDOMEN: Soft, Nontender, Nondistended; Bowel sounds present  EXTREMITIES:  2+ Peripheral Pulses, No clubbing, cyanosis or edema. Dressing c/d/i  PSYCH: AAOx3, anxious  NEUROLOGY: non-focal  SKIN: No rashes or lesions            [ ] Consultant(s) Notes Reviewed:  [x] Care Discussed with Consultants/Other Providers: Orthopedic PA - discussed dispo
CHIEF COMPLAINT: f/u  right hip pain    SUBJECTIVE / OVERNIGHT EVENTS: Patient seen and examined. No acute events overnight. Pain well controlled and patient without any complaints.    MEDICATIONS  (STANDING):  aspirin enteric coated 81 milliGRAM(s) Oral daily  calcium carbonate 1250 mG + Vitamin D (OsCal 500 + D) 1 Tablet(s) Oral two times a day  carvedilol 12.5 milliGRAM(s) Oral every 12 hours  docusate sodium 100 milliGRAM(s) Oral three times a day  gabapentin 200 milliGRAM(s) Oral three times a day  heparin  Injectable 5000 Unit(s) SubCutaneous every 8 hours  hydrALAZINE 50 milliGRAM(s) Oral every 8 hours  hydrALAZINE Injectable 5 milliGRAM(s) IV Push once  lidocaine   Patch 1 Patch Transdermal daily  melatonin 6 milliGRAM(s) Oral at bedtime  nicotine - 21 mG/24Hr(s) Patch 1 patch Transdermal daily  NIFEdipine XL 90 milliGRAM(s) Oral daily  senna 2 Tablet(s) Oral at bedtime  sodium chloride 0.9%. 1000 milliLiter(s) (100 mL/Hr) IV Continuous <Continuous>  tiZANidine 2 milliGRAM(s) Oral every 6 hours  traMADol 50 milliGRAM(s) Oral every 8 hours    MEDICATIONS  (PRN):  acetaminophen   Tablet. 650 milliGRAM(s) Oral every 6 hours PRN Mild Pain (1 - 3)  ALBUTerol    90 MICROgram(s) HFA Inhaler 2 Puff(s) Inhalation every 6 hours PRN Shortness of Breath and/or Wheezing  HYDROmorphone   Tablet 4 milliGRAM(s) Oral every 3 hours PRN Moderate Pain (4 - 6)  HYDROmorphone   Tablet 6 milliGRAM(s) Oral every 6 hours PRN Severe Pain (7 - 10)  morphine  - Injectable 2 milliGRAM(s) IV Push every 3 hours PRN breakthrough  QUEtiapine 50 milliGRAM(s) Oral every 6 hours PRN anxiety/insomnia  tiotropium 18 MICROgram(s) Capsule 1 Capsule(s) Inhalation daily PRN shortness of breath or wheezing      VITALS:  T(F): 98.2 (05-17-18 @ 09:23), Max: 98.2 (05-16-18 @ 21:07)  HR: 65 (05-17-18 @ 09:23) (56 - 67)  BP: 149/71 (05-17-18 @ 09:23) (111/43 - 175/58)  RR: 18 (05-17-18 @ 09:23) (18 - 18)  SpO2: 96% (05-17-18 @ 09:23)    PHYSICAL EXAM:  GENERAL: NAD, well-groomed, well-developed  RESPIRATORY: few rhonchi  CARDIOVASCULAR: Regular rate and rhythm; 2/6 YOLY   GASTROINTESTINAL: Soft, Nontender, Nondistended; Bowel sounds present  EXTREMITIES:  2+ Peripheral Pulses, No clubbing, cyanosis, or edema  NERVOUS SYSTEM:  Alert & Oriented X3; Moving all 4 extremities; No gross sensory deficits    LABS:              12.2                 136  | 32   | 26           5.79  >-----------< 242     ------------------------< 92                    37.5                 4.4  | 99   | 0.83                                         Ca 9.2   Mg x     Ph x           TPro  6.6  /  Alb  3.3      TBili  0.3  /  DBili  0.1        AST  15  /  ALT  9             AlkPhos  63      INR: 1.02 ;    PT: 11.3 SEC;    PTT: 30.0 SEC    RADIOLOGY & ADDITIONAL TESTS:  Imaging Personally Reviewed: [x] Yes    NST:   IMPRESSIONS:Normal Study  * The left ventricle was normal in size.  * Tracer uptake was homogeneous throughout the left  ventricle.  * Normal study; no evidence for myocardial infarction or  ischemia.  * Gated wall motion analysis was performed, and shows  normal wall motion.    [ ] Consultant(s) Notes Reviewed:  [x] Care Discussed with Consultants/Other Providers: Orthopedic PA - discussed plan for OR
Day _2__ of Anesthesia Pain Management Service    Allergies    No Known Allergies    Intolerances        SUBJECTIVE: "I'm still in so much pain"     Pain Scale Score	At rest: __10_ 	With Activity: ___ 	[ ] Refer to charted pain scores    THERAPY:    [ ] IV PCA Morphine		[ ] 5 mg/mL	[ ] 1 mg/mL  [X] IV PCA Hydromorphone	[ ] 5 mg/mL	[X] 1 mg/mL  [ ] IV PCA Fentanyl		[ ] 50 micrograms/mL    Demand dose _0.3mg_ lockout _6_ (minutes) Continuous Rate _0_ Total: _6.30mg__  Daily      MEDICATIONS  (STANDING):  acetaminophen   Tablet 650 milliGRAM(s) Oral every 8 hours  aspirin enteric coated 325 milliGRAM(s) Oral two times a day  calcium carbonate 1250 mG + Vitamin D (OsCal 500 + D) 1 Tablet(s) Oral two times a day  docusate sodium 100 milliGRAM(s) Oral three times a day  gabapentin 200 milliGRAM(s) Oral three times a day  hydrALAZINE 50 milliGRAM(s) Oral every 8 hours  hydrALAZINE Injectable 5 milliGRAM(s) IV Push once  HYDROmorphone PCA (1 mG/mL) 30 milliLiter(s) PCA Continuous PCA Continuous  ketorolac   Injectable 15 milliGRAM(s) IV Push every 6 hours  labetalol 200 milliGRAM(s) Oral three times a day  lactated ringers. 1000 milliLiter(s) (75 mL/Hr) IV Continuous <Continuous>  melatonin 6 milliGRAM(s) Oral at bedtime  nicotine - 21 mG/24Hr(s) Patch 1 patch Transdermal daily  NIFEdipine XL 90 milliGRAM(s) Oral daily  pantoprazole    Tablet 40 milliGRAM(s) Oral daily  polyethylene glycol 3350 17 Gram(s) Oral daily  QUEtiapine 100 milliGRAM(s) Oral at bedtime    MEDICATIONS  (PRN):  acetaminophen   Tablet 650 milliGRAM(s) Oral every 6 hours PRN For Temp over 38.3 C (100.94 F)  acetaminophen   Tablet. 650 milliGRAM(s) Oral every 6 hours PRN Mild Pain (1 - 3)  ALBUTerol    90 MICROgram(s) HFA Inhaler 2 Puff(s) Inhalation every 6 hours PRN Shortness of Breath and/or Wheezing  aluminum hydroxide/magnesium hydroxide/simethicone Suspension 30 milliLiter(s) Oral four times a day PRN Indigestion  diphenhydrAMINE   Injectable 12.5 milliGRAM(s) IV Push every 4 hours PRN Pruritus  HYDROmorphone PCA (1 mG/mL) Rescue Clinician Bolus 0.5 milliGRAM(s) IV Push every 15 minutes PRN for Pain Scale GREATER THAN 6  magnesium hydroxide Suspension 30 milliLiter(s) Oral two times a day PRN Constipation  naloxone Injectable 0.1 milliGRAM(s) IV Push every 3 minutes PRN For ANY of the following changes in patient status:  A. RR LESS THAN 10 breaths per minute, B. Oxygen saturation LESS THAN 90%, C. Sedation score of 6  ondansetron Injectable 4 milliGRAM(s) IV Push every 6 hours PRN Nausea  ondansetron Injectable 4 milliGRAM(s) IV Push every 6 hours PRN Nausea and/or Vomiting  QUEtiapine 50 milliGRAM(s) Oral every 6 hours PRN anxiety/insomnia  senna 2 Tablet(s) Oral at bedtime PRN Constipation  tiotropium 18 MICROgram(s) Capsule 1 Capsule(s) Inhalation daily PRN shortness of breath or wheezing  tiZANidine 2 milliGRAM(s) Oral every 6 hours PRN muscle spasm      OBJECTIVE: A&Ox3, NAD, supine in bed    Sedation Score:	[X] Alert	[ ] Drowsy	[ ] Arousable	[ ] Asleep	[ ] Unresponsive    Side Effects:	[X] None	[ ] Nausea	[ ] Vomiting	[ ] Pruritus  		  [ ] Weakness		[ ] Numbness	[ ] Other:    PT/INR - ( 21 May 2018 23:44 )   PT: 11.6 SEC;   INR: 1.04          PTT - ( 21 May 2018 23:44 )  PTT:30.3 SEC                          10.7   10.91 )-----------( 189      ( 22 May 2018 05:41 )             33.7       05-22    138  |  99  |  27<H>  ----------------------------<  162<H>  5.0   |  22  |  0.73    Ca    8.1<L>      22 May 2018 05:41    TPro  7.1  /  Alb  3.9  /  TBili  0.3  /  DBili  x   /  AST  12  /  ALT  11  /  AlkPhos  64  05-21      ASSESSMENT/ PLAN    Therapy to  be:	[X] Continue   [ ] Discontinued   [ ] Change to prn Analgesics    Documentation and Verification of current medications:  [X] Done	[ ] Not done, not eligible  [ ] Not done, reason not given    [ ]  [place]  Reviewed and Copied to Chart    Comments: Pt. very anxious and is showing drug seeking behavior, the pain she is describing is unproportional to the surgery, Ortho team made aware plan is to D/C PCA tomorrow
ORTHO PROGRESS NOTE     No significant overnight events.    Vital Signs Last 24 Hrs  T(C): 36.4 (25 May 2018 05:43), Max: 37.3 (24 May 2018 17:30)  T(F): 97.6 (25 May 2018 05:43), Max: 99.2 (24 May 2018 17:30)  HR: 65 (25 May 2018 05:43) (65 - 87)  BP: 125/48 (25 May 2018 05:43) (102/78 - 132/53)  BP(mean): --  RR: 17 (25 May 2018 05:43) (17 - 18)  SpO2: 96% (25 May 2018 05:43) (95% - 98%)    Gen: No apparent distress, alert  LE:  Dressing C/D/I          +DF/PF. moving toes          SILT, wwp at foot          compartments soft    A/P  Pt is a 72yyo Female s/p total hip Arthroplasty, POD #4    - Pain control/ Analgesia  - DVT ppx  -PT/OT - wbat/oob    -WBAT  -home vs rehab today
ORTHO PROGRESS NOTE     No significant overnight events.    Vital Signs Last 24 Hrs  T(C): 36.8 (24 May 2018 05:50), Max: 37.1 (23 May 2018 18:12)  T(F): 98.3 (24 May 2018 05:50), Max: 98.7 (23 May 2018 18:12)  HR: 78 (24 May 2018 05:50) (72 - 86)  BP: 147/50 (24 May 2018 05:50) (117/40 - 147/55)  BP(mean): --  RR: 18 (24 May 2018 05:50) (17 - 18)  SpO2: 98% (24 May 2018 05:50) (98% - 100%)    Gen: No apparent distress, alert  LE:  Dressing C/D/I          +DF/PF. moving toes          SILT, wwp at foot          compartments soft    A/P  Pt is a 72yyo Female s/p total hip Arthroplasty, POD #3    - Pain control/ Analgesia  - DVT ppx  -PT/OT - wbat/oob    -WBAT  -home vs rehab today
ORTHO PROGRESS NOTE     Pt seen and examined at bedside, denies SOB, CP, Dizziness. N/V/D /HA.  No significant overnight events. Pain well controlled.    Vital Signs Last 24 Hrs  T(C): 36.4 (23 May 2018 05:43), Max: 36.9 (22 May 2018 06:23)  T(F): 97.6 (23 May 2018 05:43), Max: 98.4 (22 May 2018 06:23)  HR: 63 (23 May 2018 05:43) (60 - 82)  BP: 140/52 (23 May 2018 05:43) (108/42 - 140/52)  BP(mean): --  RR: 17 (23 May 2018 05:43) (17 - 18)  SpO2: 99% (23 May 2018 05:43) (96% - 100%)    Gen: No apparent distress, alert  LE:  Dressing C/D/I          +DF/PF. moving toes          SILT, wwp at foot          compartments soft    Labs:  CBC Full  -  ( 22 May 2018 05:41 )  WBC Count : 10.91 K/uL  Hemoglobin : 10.7 g/dL  Hematocrit : 33.7 %  Platelet Count - Automated : 189 K/uL  Mean Cell Volume : 102.7 fL  Mean Cell Hemoglobin : 32.6 pg  Mean Cell Hemoglobin Concentration : 31.8 %  Auto Neutrophil # : x  Auto Lymphocyte # : x  Auto Monocyte # : x  Auto Eosinophil # : x  Auto Basophil # : x  Auto Neutrophil % : x  Auto Lymphocyte % : x  Auto Monocyte % : x  Auto Eosinophil % : x  Auto Basophil % : x      05-22    138  |  99  |  27<H>  ----------------------------<  162<H>  5.0   |  22  |  0.73    Ca    8.1<L>      22 May 2018 05:41    TPro  7.1  /  Alb  3.9  /  TBili  0.3  /  DBili  x   /  AST  12  /  ALT  11  /  AlkPhos  64  05-21      A/P  Pt is a 72yyo Female s/p total hip Arthroplasty, POD #2    - Pain control/ Analgesia  - DVT ppx  -PT/OT - wbat/oob    -WBAT
ORTHO PROGRESS NOTE     Pt seen and examined at bedside. No significant overnight events. Pain well controlled.     Vital Signs Last 24 Hrs  T(C): 36.4 (18 May 2018 05:13), Max: 36.8 (17 May 2018 09:23)  T(F): 97.5 (18 May 2018 05:13), Max: 98.2 (17 May 2018 09:23)  HR: 52 (18 May 2018 05:13) (52 - 67)  BP: 144/47 (18 May 2018 05:13) (125/47 - 152/59)  BP(mean): --  RR: 17 (18 May 2018 05:13) (17 - 18)  SpO2: 96% (18 May 2018 05:13) (95% - 100%)    Gen: No apparent distress, alert  LE:           +DF/PF. moving toes          SILT, wwp at foot          compartments soft    Labs:  CBC Full  -  ( 18 May 2018 05:18 )  WBC Count : 5.56 K/uL  Hemoglobin : 12.2 g/dL  Hematocrit : 38.0 %  Platelet Count - Automated : 269 K/uL  Mean Cell Volume : 100.8 fL  Mean Cell Hemoglobin : 32.4 pg  Mean Cell Hemoglobin Concentration : 32.1 %  Auto Neutrophil # : x  Auto Lymphocyte # : x  Auto Monocyte # : x  Auto Eosinophil # : x  Auto Basophil # : x  Auto Neutrophil % : x  Auto Lymphocyte % : x  Auto Monocyte % : x  Auto Eosinophil % : x  Auto Basophil % : x      05-18    139  |  99  |  28<H>  ----------------------------<  93  4.4   |  30  |  0.85    Ca    8.9      18 May 2018 05:18  Phos  3.3     05-16  Mg     1.9     05-16    TPro  6.6  /  Alb  3.3  /  TBili  0.3  /  DBili  0.1  /  AST  15  /  ALT  9   /  AlkPhos  63  05-16      A/P  Pt is a 72y old Female with R hip AVN s/p CRPP  -OR today  -pain control  -WBAT RLE  -NPO   -OR at 530
ORTHO PROGRESS NOTE     Pt seen and examined at bedside. No significant overnight events. Pain well controlled.   ICU Vital Signs Last 24 Hrs  T(C): 36.4 (19 May 2018 01:44), Max: 36.8 (18 May 2018 13:18)  T(F): 97.6 (19 May 2018 01:44), Max: 98.3 (18 May 2018 13:18)  HR: 65 (19 May 2018 01:44) (52 - 79)  BP: 133/58 (19 May 2018 01:44) (127/50 - 168/61)  BP(mean): --  ABP: --  ABP(mean): --  RR: 17 (19 May 2018 01:44) (17 - 18)  SpO2: 96% (19 May 2018 01:44) (95% - 97%)      Gen: No apparent distress, alert  LE:           +DF/PF. moving toes          SILT, wwp at foot          compartments soft                          12.2   5.56  )-----------( 269      ( 18 May 2018 05:18 )             38.0     05-18    139  |  99  |  28<H>  ----------------------------<  93  4.4   |  30  |  0.85    Ca    8.9      18 May 2018 05:18        A/P  Pt is a 72y old Female with R hip AVN s/p CRPP  -OR planned for 5/21  -pain control  -WBAT RLE  -preop over the weekend
Ortho Progress Note  DAQUAN DUKE   MRN-[id number]    72yFemale is for OR today w Dr. Jaime, pt w/out any c/o.  Resting comfortably, NAD, ANO x 3    Vital Signs Last 24 Hrs  T(C): 36.9 (21 May 2018 05:47), Max: 36.9 (20 May 2018 20:20)  T(F): 98.4 (21 May 2018 05:47), Max: 98.4 (20 May 2018 20:20)  HR: 56 (21 May 2018 05:47) (56 - 75)  BP: 118/33 (21 May 2018 05:47) (104/45 - 158/58)  BP(mean): --  RR: 16 (21 May 2018 05:47) (16 - 17)  SpO2: 98% (21 May 2018 05:47) (97% - 99%)  No Known Allergies                              12.8   6.54  )-----------( 227      ( 20 May 2018 05:57 )             39.9     05-20    137  |  98  |  38<H>  ----------------------------<  95  4.4   |  26  |  0.91    Ca    8.9      20 May 2018 05:57      PT/INR - ( 20 May 2018 05:57 )   PT: 10.5 SEC;   INR: 0.91          PTT - ( 20 May 2018 05:57 )  PTT:26.1 SEC    A/P Ortho Stable await OR today  ck Labs this am  continue to hold anticoagulation
Orthopaedic Surgery POC    Subjective:   Patient seen and examined  No acute events postop  Pain controlled  Denies CP/SOB/F/C'  Urinary retention postop    Objective:  T(C): 36.8 (05-21-18 @ 21:00), Max: 37 (05-21-18 @ 04:36)  HR: 60 (05-21-18 @ 21:00) (54 - 71)  BP: 131/74 (05-21-18 @ 21:00) (118/33 - 164/62)  RR: 20 (05-21-18 @ 21:00) (16 - 22)  SpO2: 94% (05-21-18 @ 21:00) (94% - 100%)  Wt(kg): --    05-20 @ 07:01  -  05-21 @ 07:00  --------------------------------------------------------  IN: 1000 mL / OUT: 0 mL / NET: 1000 mL        PE    NAD  RLE:   dressing C/D/I  motor intact GS/TA/EHL  SILT S/S/SP/DP  WWP                          11.9   6.19  )-----------( 230      ( 21 May 2018 14:26 )             37.3     05-21    141  |  101  |  26<H>  ----------------------------<  87  5.2   |  29  |  0.71    Ca    9.0      21 May 2018 14:26    TPro  7.1  /  Alb  3.9  /  TBili  0.3  /  DBili  x   /  AST  12  /  ALT  11  /  AlkPhos  64  05-21    PT/INR - ( 21 May 2018 14:26 )   PT: 11.0 SEC;   INR: 0.96          PTT - ( 21 May 2018 14:26 )  PTT:30.9 SEC      72y Female s/p R hip YARI, USAMA  - Pain control  - WBAT  - straight cath for retention  - PT/OT/OOB  - DVT ppx  - Dispo planning
Orthopaedic Surgery Progress Note    Subjective:   Patient seen and examined  Complains about pain control overnight, does not like her PCA, thinks the morphine worked better  Straight cathed for residual urine overnight    Objective:  T(C): 36.9 (05-22-18 @ 06:23), Max: 37 (05-21-18 @ 13:31)  HR: 60 (05-22-18 @ 06:23) (54 - 71)  BP: 125/48 (05-22-18 @ 06:23) (118/58 - 164/62)  RR: 18 (05-22-18 @ 06:23) (16 - 22)  SpO2: 98% (05-22-18 @ 06:23) (94% - 100%)    05-20 @ 07:01  -  05-21 @ 07:00  --------------------------------------------------------  IN: 1000 mL / OUT: 0 mL / NET: 1000 mL    05-21 @ 07:01  -  05-22 @ 06:25  --------------------------------------------------------  IN: 0 mL / OUT: 2100 mL / NET: -2100 mL    PE  NAD  RLE:   dressing C/D/I: aquacel  motor intact GS/TA/EHL  SILT S/S/SP/DP  WWP distally                        11.0   10.71 )-----------( 212      ( 21 May 2018 23:44 )             34.8     05-21    136  |  97<L>  |  25<H>  ----------------------------<  166<H>  5.0   |  23  |  0.76    Ca    8.1<L>      21 May 2018 23:44    TPro  7.1  /  Alb  3.9  /  TBili  0.3  /  DBili  x   /  AST  12  /  ALT  11  /  AlkPhos  64  05-21    PT/INR - ( 21 May 2018 23:44 )   PT: 11.6 SEC;   INR: 1.04       PTT - ( 21 May 2018 23:44 )  PTT:30.3 SEC    72y Female POD1 s/p YARI converted to R USAMA    - Pain control  - WBAT in KI  - Posterior dislocation precautions  - PT/OT/OOB  - DVT ppx  - FU OR path, Cx  - Dispo planning    Deni Lakhani MD
Orthopedic Surgery Progress Note  No acute events overnight.  Pain still not controlled, but receiving a lot of pain medications.  No chest pain, shortness of breath, light-headedness.     O:  Vital Signs Last 24 Hrs  T(C): 36.8 (20 May 2018 04:33), Max: 36.9 (19 May 2018 06:20)  T(F): 98.3 (20 May 2018 04:33), Max: 98.4 (19 May 2018 06:20)  HR: 66 (20 May 2018 04:33) (56 - 76)  BP: 122/60 (20 May 2018 04:33) (110/50 - 159/76)  RR: 16 (20 May 2018 04:33) (16 - 18)  SpO2: 99% (20 May 2018 04:33) (94% - 99%)    Gen: NAD  RLE  shortened  EHL/FHL/TA/GS intact  SILT DP/SP/MCDANIEL/Sa  WWP distally    Labs:                        12.2   5.56  )-----------( 269      ( 18 May 2018 05:18 )             38.0     05-18    139  |  99  |  28<H>  ----------------------------<  93  4.4   |  30  |  0.85    PT/INR - ( 18 May 2018 05:18 )   PT: 10.8 SEC;   INR: 0.94       PTT - ( 18 May 2018 05:18 )  PTT:29.6 SEC    A/P 72y year old female with AVN of R femoral head 7 months s/p CRPP for femoral neck fx    Pain Control  DVT PPX  PT/OOB  WBAT   FU AM preop labs  Plan for OR Monday with Dr. Jaime for YARI, possible cement spacer placement, possible USAMA  Dispo Planning: PT following surgery    Deni Lakhani MD
Orthopedics Preop Operative Note     Diagnosis: Painful hardware right hip  Surgeon: Dr Jaime  Procedure: YARI converted to a right total hip replacement     Labs:   CBC Full  -  ( 17 May 2018 06:30 )  WBC Count : 5.79 K/uL  Hemoglobin : 12.2 g/dL  Hematocrit : 37.5 %  Platelet Count - Automated : 242 K/uL  Mean Cell Volume : 101.4 fL  Mean Cell Hemoglobin : 33.0 pg  Mean Cell Hemoglobin Concentration : 32.5 %  Auto Neutrophil # : x  Auto Lymphocyte # : x  Auto Monocyte # : x  Auto Eosinophil # : x  Auto Basophil # : x  Auto Neutrophil % : x  Auto Lymphocyte % : x  Auto Monocyte % : x  Auto Eosinophil % : x  Auto Basophil % : x    05-17    136  |  99  |  26<H>  ----------------------------<  92  4.4   |  32<H>  |  0.83    Ca    9.2      17 May 2018 06:30  Phos  3.3     05-16  Mg     1.9     05-16    TPro  6.6  /  Alb  3.3  /  TBili  0.3  /  DBili  0.1  /  AST  15  /  ALT  9   /  AlkPhos  63  05-16    PT/INR - ( 17 May 2018 06:30 )   PT: 11.3 SEC;   INR: 1.02          PTT - ( 17 May 2018 06:30 )  PTT:30.0 SEC    EKG: In chart  Chest Xray: < from: Xray Chest 1 View AP/PA (05.14.18 @ 19:36) >  Bibasilar linear opacity likely represents atelectasis.  Consent: Completed     Plan: 72y s/p painful hardware right hip   1. NPO and IVF @midnight  2. WBAT right hip  3. Hold anticoagulation at midnight   4. Ortho surgery on (5/18  5. Venodynes/Foot pumps  6. Incentive spiromety  7. F/u med/cards clearance   8. Pain control
Patient is a 72y old  Female who presents with a chief complaint of R hip pain impairing her ability to ambulate (14 May 2018 18:57)        SUBJECTIVE / OVERNIGHT EVENTS:  no acute events o/n  pt sitting in chair resting comfortably  wants to know what time her surgery will be tomorrow       MEDICATIONS  (STANDING):  calcium carbonate 1250 mG + Vitamin D (OsCal 500 + D) 1 Tablet(s) Oral two times a day  docusate sodium 100 milliGRAM(s) Oral three times a day  gabapentin 200 milliGRAM(s) Oral three times a day  heparin  Injectable 5000 Unit(s) SubCutaneous every 8 hours  hydrALAZINE 50 milliGRAM(s) Oral every 8 hours  hydrALAZINE Injectable 5 milliGRAM(s) IV Push once  labetalol 200 milliGRAM(s) Oral three times a day  lidocaine   Patch 1 Patch Transdermal daily  melatonin 6 milliGRAM(s) Oral at bedtime  nicotine - 21 mG/24Hr(s) Patch 1 patch Transdermal daily  NIFEdipine XL 90 milliGRAM(s) Oral daily  QUEtiapine 100 milliGRAM(s) Oral at bedtime  senna 2 Tablet(s) Oral at bedtime  sodium chloride 0.9%. 1000 milliLiter(s) (125 mL/Hr) IV Continuous <Continuous>  traMADol 50 milliGRAM(s) Oral every 8 hours    MEDICATIONS  (PRN):  acetaminophen   Tablet. 650 milliGRAM(s) Oral every 6 hours PRN Mild Pain (1 - 3)  ALBUTerol    90 MICROgram(s) HFA Inhaler 2 Puff(s) Inhalation every 6 hours PRN Shortness of Breath and/or Wheezing  HYDROmorphone   Tablet 4 milliGRAM(s) Oral every 3 hours PRN Moderate Pain (4 - 6)  HYDROmorphone   Tablet 6 milliGRAM(s) Oral every 6 hours PRN Severe Pain (7 - 10)  morphine  - Injectable 2 milliGRAM(s) IV Push every 3 hours PRN breakthrough  QUEtiapine 50 milliGRAM(s) Oral every 6 hours PRN anxiety/insomnia  tiotropium 18 MICROgram(s) Capsule 1 Capsule(s) Inhalation daily PRN shortness of breath or wheezing  tiZANidine 2 milliGRAM(s) Oral every 6 hours PRN muscle spasm      Vital Signs Last 24 Hrs  T(C): 36.8 (20 May 2018 04:33), Max: 36.8 (19 May 2018 16:54)  T(F): 98.3 (20 May 2018 04:33), Max: 98.3 (20 May 2018 01:28)  HR: 65 (20 May 2018 09:26) (64 - 76)  BP: 158/58 (20 May 2018 09:26) (110/50 - 158/58)  BP(mean): --  RR: 16 (20 May 2018 09:26) (16 - 18)  SpO2: 99% (20 May 2018 09:26) (94% - 99%)  CAPILLARY BLOOD GLUCOSE        I&O's Summary        PHYSICAL EXAM:  GENERAL: NAD,   RESPIRATORY: ctab no w/r/r  CARDIOVASCULAR: Regular rate and rhythm; 2/6 YOLY   GASTROINTESTINAL: Soft, Nontender, Nondistended; Bowel sounds present  EXTREMITIES:  2+ Peripheral Pulses, No clubbing, cyanosis, or edema  NERVOUS SYSTEM:  Alert & Oriented X3; Moving all 4 extremities; No gross sensory deficits      LABS:                        12.8   6.54  )-----------( 227      ( 20 May 2018 05:57 )             39.9     05-20    137  |  98  |  38<H>  ----------------------------<  95  4.4   |  26  |  0.91    Ca    8.9      20 May 2018 05:57      PT/INR - ( 20 May 2018 05:57 )   PT: 10.5 SEC;   INR: 0.91          PTT - ( 20 May 2018 05:57 )  PTT:26.1 SEC          RADIOLOGY & ADDITIONAL TESTS:    Imaging Personally Reviewed:  Consultant(s) Notes Reviewed:    Care Discussed with Consultants/Other Providers: discussed disposition w/ Ortho resident
Patient is a 72y old  Female who presents with a chief complaint of R hip pain impairing her ability to ambulate (14 May 2018 18:57)      HPI:  HPI: 72y Female w/ hx of R femoral neck fx s/p CRPP at [facility] [place] 10/17, CAD s/p multiple stents (remote), HTN, HLD, EtOH abuse, and anxiety, who is a community ambulator w/o assistive devices presents c/o months of progressively worsening R hip pain that has now become so bad that she is unable to ambulate. Says that the pain is mostly in her right groin and occasionally radiates down her anterior leg. Denies numbness/tingling. Denies fever/chills. Denies pain/injury elsewhere. Patient says that she is an active smoker. She also says that following her CRPP procedure, she had some sort of infection where her legs get red and swollen and she required antibiotics, possible cellulitis.     Of note, patient says that her cardiologist is Dr. Tera Romero, a doctor affiliated with [place].    PAST MEDICAL & SURGICAL HISTORY:  Pain of right hip joint  Migraine  Alcohol abuse  Seizure  Stented coronary artery  Coronary artery disease  Anxiety  HTN (hypertension)  Emphysema, unspecified  Anxiety  Migraine  CAD (coronary artery disease)  Hyperlipidemia  Hypertension  No significant past surgical history  S/P bladder repair    Social hx:  Active smoker  Former drinker: hx of EtOH abuse    MEDICATIONS  (STANDING):  docusate sodium 100 milliGRAM(s) Oral three times a day  heparin  Injectable 5000 Unit(s) SubCutaneous every 8 hours  lisinopril 20 milliGRAM(s) Oral daily  metoprolol tartrate 25 milliGRAM(s) Oral two times a day  senna 2 Tablet(s) Oral at bedtime  simvastatin 20 milliGRAM(s) Oral at bedtime    Allergies  No Known Allergies                        12.9   7.70  )-----------( 322      ( 14 May 2018 12:09 )             40.1     14 May 2018 12:09    140    |  100    |  29     ----------------------------<  88     3.8     |  27     |  1.45     Ca    8.9        14 May 2018 12:09    TPro  7.3    /  Alb  4.0    /  TBili  < 0.2  /  DBili  x      /  AST  18     /  ALT  12     /  AlkPhos  76     14 May 2018 12:09    Vital Signs Last 24 Hrs  T(C): 37.2 (05-14-18 @ 18:30), Max: 37.3 (05-14-18 @ 15:55)  T(F): 99 (05-14-18 @ 18:30), Max: 99.2 (05-14-18 @ 15:55)  HR: 65 (05-14-18 @ 18:30) (65 - 76)  BP: 108/54 (05-14-18 @ 18:30) (108/54 - 172/73)  RR: 18 (05-14-18 @ 18:30) (18 - 18)  SpO2: 98% (05-14-18 @ 18:30) (98% - 99%)    Imaging: XR were personally reviewed and demonstrates collapse of the femoral head/neck s/p CRPP, with backing out of the screws    CT hip:  IMPRESSION:  1.  Status post ORIF of the right hip with transcervical screws.  2.  Malunion of the right transcervical femoral neck fracture with   lucency surrounding the distal screw tips. Screws closely approximate and   contact the articular surface of the femoral head.  3.  Geographic area of hypointense signal within the femoral head   suggesting avascular necrosis  4.  Fluid and calcifications overlying the right lateral screws adjacent   to the greater trochanter suggestive of a bursitis.    Physical Exam  Gen: NAD  Gait: antalgic gait, patient only able to take a few unstable steps before stoping due to pain  RLE: skin intact, but tented, presumably by CRPP screws, no areas of skin breakdown  5/5 Quads/TA/EHL/GS  SILT in all distributions  WWP distally    A/P: 72y Female 7 months s/p R femoral neck fracture CRPP with avascular necrosis of the femoral head and collapse causing severe arthrosis. Patient being admitted to orthopaedic service for further workup and consideration of total hip arthroplasty.    Pain control  Needs workup to r/o infectious causes of hip pain: FU ESR, CRP  FU pre-op labs and studies  Medical clearance/optimization for OR  Cardiology consult  DVT ppx  Admit to Ortho    Patient discussed with Dr. Addison Lakhani MD (14 May 2018 18:57)      PAST MEDICAL & SURGICAL HISTORY:  Pain of right hip joint  Migraine  Alcohol abuse  Seizure  Stented coronary artery  Coronary artery disease  Anxiety  HTN (hypertension)  Emphysema, unspecified  Anxiety  Migraine  CAD (coronary artery disease)  Hyperlipidemia  Hypertension  No significant past surgical history  S/P bladder repair      MEDICATIONS  (STANDING):  aspirin enteric coated 81 milliGRAM(s) Oral daily  calcium carbonate 1250 mG + Vitamin D (OsCal 500 + D) 1 Tablet(s) Oral two times a day  carvedilol 12.5 milliGRAM(s) Oral every 12 hours  docusate sodium 100 milliGRAM(s) Oral three times a day  folic acid 1 milliGRAM(s) Oral daily  heparin  Injectable 5000 Unit(s) SubCutaneous every 8 hours  hydrALAZINE 25 milliGRAM(s) Oral every 8 hours  hydrALAZINE Injectable 5 milliGRAM(s) IV Push once  multivitamin 1 Tablet(s) Oral daily  nicotine - 21 mG/24Hr(s) Patch 1 patch Transdermal daily  NIFEdipine XL 90 milliGRAM(s) Oral daily  senna 2 Tablet(s) Oral at bedtime  thiamine 100 milliGRAM(s) Oral daily  traMADol 50 milliGRAM(s) Oral every 8 hours    MEDICATIONS  (PRN):  acetaminophen   Tablet. 650 milliGRAM(s) Oral every 6 hours PRN Mild Pain (1 - 3)  ALBUTerol    90 MICROgram(s) HFA Inhaler 2 Puff(s) Inhalation every 6 hours PRN Shortness of Breath and/or Wheezing  HYDROmorphone   Tablet 2 milliGRAM(s) Oral every 3 hours PRN Moderate Pain (4 - 6)  HYDROmorphone   Tablet 4 milliGRAM(s) Oral every 3 hours PRN Severe Pain (7 - 10)  LORazepam     Tablet 1 milliGRAM(s) Oral every 2 hours PRN CIWA-Ar score increase by 2 points and a total score of 7 or less  LORazepam     Tablet 1 milliGRAM(s) Oral every 1 hour PRN CIWA-Ar score 8 or greater  LORazepam     Tablet 2 milliGRAM(s) Oral every 2 hours PRN Symptom-triggered: 2 point increase in CIWA -Ar score and a total score of 7 or LESS  LORazepam     Tablet 2 milliGRAM(s) Oral every 1 hour PRN Symptom-triggered: each CIWA -Ar score 8 or GREATER  LORazepam   Injectable 2 milliGRAM(s) IV Push every 1 hour PRN Symptom-triggered: each CIWA -Ar score 8 or GREATER  morphine  - Injectable 2 milliGRAM(s) IV Push every 4 hours PRN severe breakthrough pain unrelieved by oxycodone  tiotropium 18 MICROgram(s) Capsule 1 Capsule(s) Inhalation daily PRN shortness of breath or wheezing      ICU Vital Signs Last 24 Hrs  T(C): 36.7 (16 May 2018 07:05), Max: 37 (15 May 2018 18:26)  T(F): 98.1 (16 May 2018 07:05), Max: 98.6 (15 May 2018 18:26)  HR: 66 (16 May 2018 08:08) (64 - 100)  BP: 166/69 (16 May 2018 08:08) (153/53 - 193/81)  BP(mean): --  ABP: --  ABP(mean): --  RR: 16 (16 May 2018 07:05) (16 - 18)  SpO2: 97% (16 May 2018 07:05) (75% - 98%)      Vital Signs Last 24 Hrs  T(C): 36.7 (16 May 2018 07:05), Max: 37 (15 May 2018 18:26)  T(F): 98.1 (16 May 2018 07:05), Max: 98.6 (15 May 2018 18:26)  HR: 66 (16 May 2018 08:08) (64 - 100)  BP: 166/69 (16 May 2018 08:08) (153/53 - 193/81)  BP(mean): --  RR: 16 (16 May 2018 07:05) (16 - 18)  SpO2: 97% (16 May 2018 07:05) (75% - 98%)                          12.5   6.48  )-----------( 230      ( 16 May 2018 11:50 )             38.4       LIVER FUNCTIONS - ( 16 May 2018 11:50 )  Alb: 3.3 g/dL / Pro: 6.6 g/dL / ALK PHOS: 63 u/L / ALT: 9 u/L / AST: 15 u/L / GGT: x             PT/INR - ( 15 May 2018 05:53 )   PT: 11.0 SEC;   INR: 0.99          PTT - ( 15 May 2018 05:53 )  PTT:30.3 SEC      Subjective: "I'm in a lot of pain right now, the pain meds they are giving me don't really work that well. The pain is in my right hip and it feels like a stabbing feeling and also I get shooting pain down the right said of my leg. Only Morphine IV helps with my pain. I don't want to take anything else unless it's a narcotic. I'm very anxious as well. I take ativan and xanax at home, but they haven't given me anything in the hospital.     Objective: Pt. A&Ox3, NAD, sitting up in chair eating. Pt. maintains eye contact and answers all questions appropriately. Pt. history of past opioid use is inaccurate and can not be trusted. Pt. seems very anxious and jittery.             COMMENTS/PLAN:  1. Add Tizanidine 2mg Q6hr Standing for 2 days (muscle spasm), PRN afterwards.  2. Add lido derm patch to affected area  3. Consider toradol 15mg Q6hr PRN  4. Psych consult for anxiety and drug seeking behavior  5. May increase dilaudid to 6mg Q3hr PRN if psych clears pt. of anxiety and drug seeking.   6. Add gabpentin 200mg TID
Patient with a lot of pain overnight.     Vital Signs Last 24 Hrs  T(C): 36.8 (15 May 2018 05:01), Max: 37.3 (14 May 2018 15:55)  T(F): 98.3 (15 May 2018 05:01), Max: 99.2 (14 May 2018 15:55)  HR: 61 (15 May 2018 05:01) (61 - 83)  BP: 153/61 (15 May 2018 05:01) (108/54 - 172/73)  BP(mean): --  RR: 18 (15 May 2018 05:01) (18 - 18)  SpO2: 99% (15 May 2018 05:01) (98% - 99%)    NAD, alert  RLE: skin intact. mild prominence of the screws, but no skin tenting or erythema  +DF/PF. moving toes. SILT  wwp at foot                          12.3   5.91  )-----------( 259      ( 15 May 2018 05:53 )             38.0   05-15    140  |  101  |  23  ----------------------------<  91  4.1   |  28  |  0.76    Ca    8.8      15 May 2018 05:53    TPro  7.3  /  Alb  4.0  /  TBili  < 0.2<L>  /  DBili  x   /  AST  18  /  ALT  12  /  AlkPhos  76  05-14    ESR 26  CRP 12.3    XR: femoral head necrosis. with collapse and backing out of the screws    A/p: 72F with R femoral head AVN 6 months s/p CRPP  -pain control  -will do infection workup. if negative, then plan for [facility] sometime this week  -needs med/cards clearance  -WBAT RLE
Subjective: Patient seen and examined. No new events except as noted.     SUBJECTIVE/ROS:    feels ok but has pain     MEDICATIONS:  MEDICATIONS  (STANDING):  aspirin enteric coated 81 milliGRAM(s) Oral daily  calcium carbonate 1250 mG + Vitamin D (OsCal 500 + D) 1 Tablet(s) Oral two times a day  carvedilol 12.5 milliGRAM(s) Oral every 12 hours  docusate sodium 100 milliGRAM(s) Oral three times a day  gabapentin 200 milliGRAM(s) Oral three times a day  heparin  Injectable 5000 Unit(s) SubCutaneous every 8 hours  hydrALAZINE 50 milliGRAM(s) Oral every 8 hours  hydrALAZINE Injectable 5 milliGRAM(s) IV Push once  lidocaine   Patch 1 Patch Transdermal daily  melatonin 6 milliGRAM(s) Oral at bedtime  nicotine - 21 mG/24Hr(s) Patch 1 patch Transdermal daily  NIFEdipine XL 90 milliGRAM(s) Oral daily  senna 2 Tablet(s) Oral at bedtime  sodium chloride 0.9%. 1000 milliLiter(s) (100 mL/Hr) IV Continuous <Continuous>  tiZANidine 2 milliGRAM(s) Oral every 6 hours  traMADol 50 milliGRAM(s) Oral every 8 hours      PHYSICAL EXAM:  T(C): 36.6 (05-18-18 @ 10:15), Max: 36.8 (05-17-18 @ 18:09)  HR: 56 (05-18-18 @ 10:15) (52 - 67)  BP: 150/68 (05-18-18 @ 10:15) (125/47 - 152/59)  RR: 17 (05-18-18 @ 10:15) (17 - 18)  SpO2: 96% (05-18-18 @ 10:15) (95% - 100%)  Wt(kg): --  I&O's Summary    17 May 2018 07:01  -  18 May 2018 07:00  --------------------------------------------------------  IN: 0 mL / OUT: 401 mL / NET: -401 mL    18 May 2018 07:01  -  18 May 2018 12:15  --------------------------------------------------------  IN: 0 mL / OUT: 500 mL / NET: -500 mL        Weight (kg): 52 (05-18 @ 07:43)    JVP: Normal  Neck: supple  Lung: clear   CV: S1 S2 , Murmur:  Abd: soft  Ext: No edema  neuro: Awake / alert  Psych: flat affect  Skin: normal       LABS/DATA:    CARDIAC MARKERS:                                12.2   5.56  )-----------( 269      ( 18 May 2018 05:18 )             38.0     05-18    139  |  99  |  28<H>  ----------------------------<  93  4.4   |  30  |  0.85    Ca    8.9      18 May 2018 05:18      proBNP:   Lipid Profile:   HgA1c:   TSH:     TELE:  EKG:
Subjective: Patient seen and examined. No new events except as noted.     SUBJECTIVE/ROS:    has pain in hip     MEDICATIONS:  MEDICATIONS  (STANDING):  aspirin enteric coated 81 milliGRAM(s) Oral daily  calcium carbonate 1250 mG + Vitamin D (OsCal 500 + D) 1 Tablet(s) Oral two times a day  carvedilol 12.5 milliGRAM(s) Oral every 12 hours  docusate sodium 100 milliGRAM(s) Oral three times a day  folic acid 1 milliGRAM(s) Oral daily  heparin  Injectable 5000 Unit(s) SubCutaneous every 8 hours  hydrALAZINE 25 milliGRAM(s) Oral every 8 hours  hydrALAZINE Injectable 5 milliGRAM(s) IV Push once  multivitamin 1 Tablet(s) Oral daily  nicotine - 21 mG/24Hr(s) Patch 1 patch Transdermal daily  NIFEdipine XL 90 milliGRAM(s) Oral daily  senna 2 Tablet(s) Oral at bedtime  thiamine 100 milliGRAM(s) Oral daily  traMADol 50 milliGRAM(s) Oral every 8 hours      PHYSICAL EXAM:  T(C): 36.7 (05-16-18 @ 07:05), Max: 37 (05-15-18 @ 18:26)  HR: 70 (05-16-18 @ 07:05) (63 - 100)  BP: 168/61 (05-16-18 @ 07:05) (149/59 - 193/81)  RR: 16 (05-16-18 @ 07:05) (16 - 18)  SpO2: 97% (05-16-18 @ 07:05) (75% - 100%)  Wt(kg): --  I&O's Summary    15 May 2018 07:01  -  16 May 2018 07:00  --------------------------------------------------------  IN: 0 mL / OUT: 1620 mL / NET: -1620 mL      Height (cm): 157.48 (05-15 @ 09:43)  Weight (kg): 49 (05-15 @ 09:43)  BMI (kg/m2): 19.8 (05-15 @ 09:43)  BSA (m2): 1.47 (05-15 @ 09:43)  JVP: Normal  Neck: supple  Lung: clear   CV: S1 S2 , Murmur:  Abd: soft  Ext: No edema  neuro: Awake / alert  Psych: flat affect  Skin: normal       LABS/DATA:    CARDIAC MARKERS:                                12.3   5.91  )-----------( 259      ( 15 May 2018 05:53 )             38.0     05-15    140  |  101  |  23  ----------------------------<  91  4.1   |  28  |  0.76    Ca    8.8      15 May 2018 05:53    TPro  7.3  /  Alb  4.0  /  TBili  < 0.2<L>  /  DBili  x   /  AST  18  /  ALT  12  /  AlkPhos  76  05-14    proBNP:   Lipid Profile:   HgA1c:   TSH:     TELE:  EKG:
Subjective: Patient seen and examined. No new events except as noted.     SUBJECTIVE/ROS:  No chest pain, dyspnea, palpitation, or dizziness.       MEDICATIONS:  MEDICATIONS  (STANDING):  aspirin enteric coated 81 milliGRAM(s) Oral daily  calcium carbonate 1250 mG + Vitamin D (OsCal 500 + D) 1 Tablet(s) Oral two times a day  carvedilol 12.5 milliGRAM(s) Oral every 12 hours  docusate sodium 100 milliGRAM(s) Oral three times a day  gabapentin 200 milliGRAM(s) Oral three times a day  heparin  Injectable 5000 Unit(s) SubCutaneous every 8 hours  hydrALAZINE 50 milliGRAM(s) Oral every 8 hours  hydrALAZINE Injectable 5 milliGRAM(s) IV Push once  lidocaine   Patch 1 Patch Transdermal daily  melatonin 6 milliGRAM(s) Oral at bedtime  nicotine - 21 mG/24Hr(s) Patch 1 patch Transdermal daily  NIFEdipine XL 90 milliGRAM(s) Oral daily  senna 2 Tablet(s) Oral at bedtime  sodium chloride 0.9%. 1000 milliLiter(s) (100 mL/Hr) IV Continuous <Continuous>  tiZANidine 2 milliGRAM(s) Oral every 6 hours  traMADol 50 milliGRAM(s) Oral every 8 hours      PHYSICAL EXAM:  T(C): 36.6 (05-17-18 @ 12:54), Max: 36.8 (05-16-18 @ 21:07)  HR: 58 (05-17-18 @ 12:54) (56 - 67)  BP: 125/47 (05-17-18 @ 12:54) (111/43 - 175/58)  RR: 18 (05-17-18 @ 12:54) (18 - 18)  SpO2: 96% (05-17-18 @ 12:54) (95% - 100%)  Wt(kg): --  I&O's Summary    16 May 2018 07:01  -  17 May 2018 07:00  --------------------------------------------------------  IN: 0 mL / OUT: 1100 mL / NET: -1100 mL    17 May 2018 07:01  -  17 May 2018 15:33  --------------------------------------------------------  IN: 0 mL / OUT: 1 mL / NET: -1 mL      JVP: Normal  Neck: supple  Lung: clear   CV: S1 S2 , Murmur:  Abd: soft  Ext: No edema  neuro: Awake / alert  Psych: flat affect  Skin: normal       LABS/DATA:    CARDIAC MARKERS:                                12.2   5.79  )-----------( 242      ( 17 May 2018 06:30 )             37.5     05-17    136  |  99  |  26<H>  ----------------------------<  92  4.4   |  32<H>  |  0.83    Ca    9.2      17 May 2018 06:30  Phos  3.3     05-16  Mg     1.9     05-16    TPro  6.6  /  Alb  3.3  /  TBili  0.3  /  DBili  0.1  /  AST  15  /  ALT  9   /  AlkPhos  63  05-16    proBNP:   Lipid Profile:   HgA1c:   TSH:     TELE:  EKG:    < from: Transthoracic Echocardiogram (05.16.18 @ 07:30) >  CONCLUSIONS:  1. Mitral annular calcification, otherwise normal mitral  valve. Mild mitral regurgitation.  2. Aortic valve leaflet morphology not well visualized.  The valve is calcified. Peak transaortic valve gradient  equals 26 mm Hg, mean transaortic valve gradient equals 12  mm Hg, consistentwith probable mild to moderate aortic  stenosis. Mild aortic regurgitation.  3. Mildly dilated left atrium.  LA volume index = 41 cc/m2.  4. Normal left ventricular internal dimensions and wall  thicknesses.  5. Normal left ventricular systolic function. No segmental  wall motion abnormalities.  6. Normal right ventricular size and function.  7. Estimated right ventricular systolic pressure equals 43  mm Hg, assuming right atrial pressure equals 10 mm Hg,  consistent with mild pulmonary hypertension    < end of copied text >  < from: Nuclear Stress Test-Pharmacologic (05.16.18 @ 10:58) >  ------------------------------------------------------------------------  IMPRESSIONS:Normal Study  * The left ventricle was normal in size.  * Tracer uptake was homogeneous throughout the left  ventricle.  * Normal study; no evidence for myocardial infarction or  ischemia.  * Gated wall motion analysis was performed, and shows  normal wall motion.  ------------------------------------------------------------------------  Confirmed on  5/16/2018 - 14:02:30 by Ayse Hammond MD    < end of copied text >
Subjective: Patient seen and examined. No new events except as noted.     SUBJECTIVE/ROS:  has pain  No chest pain, dyspnea, palpitation, or dizziness.       MEDICATIONS:  MEDICATIONS  (STANDING):  aspirin enteric coated 325 milliGRAM(s) Oral two times a day  calcium carbonate 1250 mG + Vitamin D (OsCal 500 + D) 1 Tablet(s) Oral two times a day  docusate sodium 100 milliGRAM(s) Oral three times a day  gabapentin 300 milliGRAM(s) Oral three times a day  hydrALAZINE 50 milliGRAM(s) Oral every 8 hours  hydrALAZINE Injectable 5 milliGRAM(s) IV Push once  labetalol 200 milliGRAM(s) Oral three times a day  lactated ringers. 1000 milliLiter(s) (75 mL/Hr) IV Continuous <Continuous>  melatonin 6 milliGRAM(s) Oral at bedtime  nicotine - 21 mG/24Hr(s) Patch 1 patch Transdermal daily  NIFEdipine XL 90 milliGRAM(s) Oral daily  pantoprazole    Tablet 40 milliGRAM(s) Oral daily  polyethylene glycol 3350 17 Gram(s) Oral daily  QUEtiapine 100 milliGRAM(s) Oral at bedtime      PHYSICAL EXAM:  T(C): 36.8 (05-24-18 @ 05:50), Max: 37.1 (05-23-18 @ 18:12)  HR: 78 (05-24-18 @ 05:50) (72 - 86)  BP: 147/50 (05-24-18 @ 05:50) (117/40 - 147/55)  RR: 18 (05-24-18 @ 05:50) (17 - 18)  SpO2: 98% (05-24-18 @ 05:50) (98% - 100%)  Wt(kg): --  I&O's Summary        Appearance: Normal	  HEENT:   Normal oral mucosa, PERRL, EOMI	  Cardiovascular: Normal S1 S2,    Murmur:   Neck: JVP normal  Respiratory: Lungs clear to auscultation  Gastrointestinal:  Soft, Non-tender, + BS	  Skin: normal   Neuro: No gross deficits.   Psychiatry:  Mood & affect appropriate  Ext: No edema      LABS/DATA:    CARDIAC MARKERS:                  proBNP:   Lipid Profile:   HgA1c:   TSH:     TELE:  EKG:
Subjective: Patient seen and examined. No new events except as noted.     SUBJECTIVE/ROS:  has pain in hip  No chest pain, dyspnea, palpitation, or dizziness.       MEDICATIONS:  MEDICATIONS  (STANDING):  acetaminophen   Tablet 650 milliGRAM(s) Oral every 8 hours  aspirin enteric coated 325 milliGRAM(s) Oral two times a day  calcium carbonate 1250 mG + Vitamin D (OsCal 500 + D) 1 Tablet(s) Oral two times a day  docusate sodium 100 milliGRAM(s) Oral three times a day  gabapentin 200 milliGRAM(s) Oral three times a day  hydrALAZINE 50 milliGRAM(s) Oral every 8 hours  hydrALAZINE Injectable 5 milliGRAM(s) IV Push once  HYDROmorphone PCA (1 mG/mL) 30 milliLiter(s) PCA Continuous PCA Continuous  ketorolac   Injectable 15 milliGRAM(s) IV Push every 6 hours  labetalol 200 milliGRAM(s) Oral three times a day  lactated ringers. 1000 milliLiter(s) (75 mL/Hr) IV Continuous <Continuous>  melatonin 6 milliGRAM(s) Oral at bedtime  nicotine - 21 mG/24Hr(s) Patch 1 patch Transdermal daily  NIFEdipine XL 90 milliGRAM(s) Oral daily  pantoprazole    Tablet 40 milliGRAM(s) Oral daily  polyethylene glycol 3350 17 Gram(s) Oral daily  QUEtiapine 100 milliGRAM(s) Oral at bedtime      PHYSICAL EXAM:  T(C): 36.9 (05-22-18 @ 13:48), Max: 37 (05-21-18 @ 19:15)  HR: 72 (05-22-18 @ 13:48) (60 - 72)  BP: 139/52 (05-22-18 @ 13:48) (118/58 - 164/62)  RR: 18 (05-22-18 @ 13:48) (18 - 22)  SpO2: 100% (05-22-18 @ 13:48) (94% - 100%)  Wt(kg): --  I&O's Summary    21 May 2018 07:01  -  22 May 2018 07:00  --------------------------------------------------------  IN: 0 mL / OUT: 2100 mL / NET: -2100 mL    22 May 2018 07:01  -  22 May 2018 14:56  --------------------------------------------------------  IN: 0 mL / OUT: 200 mL / NET: -200 mL    JVP: Normal  Neck: supple  Lung: clear   CV: S1 S2 , Murmur:  Abd: soft  Ext: No edema  neuro: Awake / alert  Psych: flat affect  Skin: normal       LABS/DATA:    CARDIAC MARKERS:                                10.7   10.91 )-----------( 189      ( 22 May 2018 05:41 )             33.7     05-22    138  |  99  |  27<H>  ----------------------------<  162<H>  5.0   |  22  |  0.73    Ca    8.1<L>      22 May 2018 05:41    TPro  7.1  /  Alb  3.9  /  TBili  0.3  /  DBili  x   /  AST  12  /  ALT  11  /  AlkPhos  64  05-21    proBNP:   Lipid Profile:   HgA1c:   TSH:     TELE:  EKG:
Subjective: Patient seen and examined. No new events except as noted.     SUBJECTIVE/ROS:  no cp or sob       MEDICATIONS:  MEDICATIONS  (STANDING):  aspirin enteric coated 325 milliGRAM(s) Oral two times a day  calcium carbonate 1250 mG + Vitamin D (OsCal 500 + D) 1 Tablet(s) Oral two times a day  docusate sodium 100 milliGRAM(s) Oral three times a day  gabapentin 300 milliGRAM(s) Oral three times a day  hydrALAZINE 50 milliGRAM(s) Oral every 8 hours  hydrALAZINE Injectable 5 milliGRAM(s) IV Push once  labetalol 200 milliGRAM(s) Oral three times a day  lactated ringers. 1000 milliLiter(s) (75 mL/Hr) IV Continuous <Continuous>  melatonin 6 milliGRAM(s) Oral at bedtime  nicotine - 21 mG/24Hr(s) Patch 1 patch Transdermal daily  NIFEdipine XL 90 milliGRAM(s) Oral daily  pantoprazole    Tablet 40 milliGRAM(s) Oral daily  polyethylene glycol 3350 17 Gram(s) Oral daily  QUEtiapine 150 milliGRAM(s) Oral at bedtime  QUEtiapine 50 milliGRAM(s) Oral daily      PHYSICAL EXAM:  T(C): 36.4 (05-25-18 @ 05:43), Max: 37.3 (05-24-18 @ 17:30)  HR: 65 (05-25-18 @ 05:43) (65 - 87)  BP: 125/48 (05-25-18 @ 05:43) (102/78 - 132/53)  RR: 17 (05-25-18 @ 05:43) (17 - 18)  SpO2: 96% (05-25-18 @ 05:43) (95% - 98%)  Wt(kg): --  I&O's Summary    24 May 2018 07:01  -  25 May 2018 07:00  --------------------------------------------------------  IN: 0 mL / OUT: 550 mL / NET: -550 mL        JVP: Normal  Neck: supple  Lung: clear   CV: S1 S2 , Murmur:  Abd: soft  Ext: No edema  neuro: Awake / alert  Psych: flat affect  Skin: normal     LABS/DATA:    CARDIAC MARKERS:                  proBNP:   Lipid Profile:   HgA1c:   TSH:     TELE:  EKG:
Subjective: Patient seen and examined. No new events except as noted.     SUBJECTIVE/ROS:  no new events  OR scheduled for 5/21      MEDICATIONS:  MEDICATIONS  (STANDING):  aspirin enteric coated 81 milliGRAM(s) Oral daily  calcium carbonate 1250 mG + Vitamin D (OsCal 500 + D) 1 Tablet(s) Oral two times a day  carvedilol 12.5 milliGRAM(s) Oral every 12 hours  docusate sodium 100 milliGRAM(s) Oral three times a day  gabapentin 200 milliGRAM(s) Oral three times a day  heparin  Injectable 5000 Unit(s) SubCutaneous every 8 hours  hydrALAZINE 50 milliGRAM(s) Oral every 8 hours  hydrALAZINE Injectable 5 milliGRAM(s) IV Push once  lidocaine   Patch 1 Patch Transdermal daily  melatonin 6 milliGRAM(s) Oral at bedtime  nicotine - 21 mG/24Hr(s) Patch 1 patch Transdermal daily  NIFEdipine XL 90 milliGRAM(s) Oral daily  QUEtiapine 100 milliGRAM(s) Oral at bedtime  senna 2 Tablet(s) Oral at bedtime  sodium chloride 0.9%. 1000 milliLiter(s) (100 mL/Hr) IV Continuous <Continuous>  traMADol 50 milliGRAM(s) Oral every 8 hours      PHYSICAL EXAM:  T(C): 36.5 (05-19-18 @ 09:03), Max: 36.9 (05-19-18 @ 06:20)  HR: 56 (05-19-18 @ 09:03) (56 - 79)  BP: 159/76 (05-19-18 @ 09:03) (127/50 - 168/61)  RR: 18 (05-19-18 @ 09:03) (17 - 18)  SpO2: 96% (05-19-18 @ 09:03) (95% - 97%)  Wt(kg): --  I&O's Summary    18 May 2018 07:01  -  19 May 2018 07:00  --------------------------------------------------------  IN: 0 mL / OUT: 850 mL / NET: -850 mL          Appearance: Normal	  HEENT:   Normal oral mucosa, PERRL, EOMI	  Cardiovascular: Normal S1 S2,    Murmur:   Neck: JVP normal  Respiratory: Lungs clear to auscultation  Gastrointestinal:  Soft, Non-tender, + BS	  Skin: normal   Neuro: No gross deficits.   Psychiatry:  Mood & affect appropriate  Ext: No edema      LABS/DATA:    CARDIAC MARKERS:                                12.2   5.56  )-----------( 269      ( 18 May 2018 05:18 )             38.0     05-18    139  |  99  |  28<H>  ----------------------------<  93  4.4   |  30  |  0.85    Ca    8.9      18 May 2018 05:18      proBNP:   Lipid Profile:   HgA1c:   TSH:     TELE:  EKG:
Subjective: Patient seen and examined. No new events except as noted.     SUBJECTIVE/ROS:  pain in right hip      MEDICATIONS:  MEDICATIONS  (STANDING):  aspirin enteric coated 81 milliGRAM(s) Oral daily  calcium carbonate 1250 mG + Vitamin D (OsCal 500 + D) 1 Tablet(s) Oral two times a day  docusate sodium 100 milliGRAM(s) Oral three times a day  gabapentin 200 milliGRAM(s) Oral three times a day  heparin  Injectable 5000 Unit(s) SubCutaneous every 8 hours  hydrALAZINE 50 milliGRAM(s) Oral every 8 hours  hydrALAZINE Injectable 5 milliGRAM(s) IV Push once  labetalol 200 milliGRAM(s) Oral three times a day  lidocaine   Patch 1 Patch Transdermal daily  melatonin 6 milliGRAM(s) Oral at bedtime  nicotine - 21 mG/24Hr(s) Patch 1 patch Transdermal daily  NIFEdipine XL 90 milliGRAM(s) Oral daily  QUEtiapine 100 milliGRAM(s) Oral at bedtime  senna 2 Tablet(s) Oral at bedtime  sodium chloride 0.9%. 1000 milliLiter(s) (100 mL/Hr) IV Continuous <Continuous>  traMADol 50 milliGRAM(s) Oral every 8 hours      PHYSICAL EXAM:  T(C): 36.8 (05-20-18 @ 04:33), Max: 36.8 (05-19-18 @ 16:54)  HR: 64 (05-20-18 @ 05:36) (56 - 76)  BP: 129/77 (05-20-18 @ 05:36) (110/50 - 159/76)  RR: 16 (05-20-18 @ 04:33) (16 - 18)  SpO2: 99% (05-20-18 @ 04:33) (94% - 99%)  Wt(kg): --  I&O's Summary        JVP: Normal  Neck: supple  Lung: clear   CV: S1 S2 , Murmur:  Abd: soft  Ext: No edema  neuro: Awake / alert  Psych: flat affect  Skin: normal       LABS/DATA:    CARDIAC MARKERS:                                12.8   6.54  )-----------( 227      ( 20 May 2018 05:57 )             39.9     05-20    137  |  98  |  38<H>  ----------------------------<  95  4.4   |  26  |  0.91    Ca    8.9      20 May 2018 05:57      proBNP:   Lipid Profile:   HgA1c:   TSH:     TELE:  EKG:
Subjective: Patient seen and examined. No new events except as noted.     SUBJECTIVE/ROS:  plan for OR      MEDICATIONS:  MEDICATIONS  (STANDING):  calcium carbonate 1250 mG + Vitamin D (OsCal 500 + D) 1 Tablet(s) Oral two times a day  docusate sodium 100 milliGRAM(s) Oral three times a day  gabapentin 200 milliGRAM(s) Oral three times a day  hydrALAZINE 50 milliGRAM(s) Oral every 8 hours  hydrALAZINE Injectable 5 milliGRAM(s) IV Push once  labetalol 200 milliGRAM(s) Oral three times a day  lidocaine   Patch 1 Patch Transdermal daily  melatonin 6 milliGRAM(s) Oral at bedtime  nicotine - 21 mG/24Hr(s) Patch 1 patch Transdermal daily  NIFEdipine XL 90 milliGRAM(s) Oral daily  QUEtiapine 100 milliGRAM(s) Oral at bedtime  senna 2 Tablet(s) Oral at bedtime  sodium chloride 0.9%. 1000 milliLiter(s) (125 mL/Hr) IV Continuous <Continuous>  traMADol 50 milliGRAM(s) Oral every 8 hours      PHYSICAL EXAM:  T(C): 36.9 (05-21-18 @ 05:47), Max: 36.9 (05-20-18 @ 20:20)  HR: 60 (05-21-18 @ 07:30) (56 - 75)  BP: 149/55 (05-21-18 @ 07:30) (104/45 - 158/58)  RR: 16 (05-21-18 @ 07:30) (16 - 17)  SpO2: 97% (05-21-18 @ 07:30) (97% - 99%)  Wt(kg): --  I&O's Summary    20 May 2018 07:01  -  21 May 2018 07:00  --------------------------------------------------------  IN: 1000 mL / OUT: 0 mL / NET: 1000 mL        Weight (kg): 51.7 (05-21 @ 04:36)    JVP: Normal  Neck: supple  Lung: clear   CV: S1 S2 , Murmur:  Abd: soft  Ext: No edema  neuro: Awake / alert  Psych: flat affect  Skin: normal       LABS/DATA:    CARDIAC MARKERS:                                12.8   6.54  )-----------( 227      ( 20 May 2018 05:57 )             39.9     05-20    137  |  98  |  38<H>  ----------------------------<  95  4.4   |  26  |  0.91    Ca    8.9      20 May 2018 05:57      proBNP:   Lipid Profile:   HgA1c:   TSH:     TELE:  EKG:
Subjective: Patient seen and examined. No new events except as noted.     SUBJECTIVE/ROS:  resting       MEDICATIONS:  MEDICATIONS  (STANDING):  acetaminophen   Tablet 650 milliGRAM(s) Oral every 8 hours  aspirin enteric coated 325 milliGRAM(s) Oral two times a day  calcium carbonate 1250 mG + Vitamin D (OsCal 500 + D) 1 Tablet(s) Oral two times a day  docusate sodium 100 milliGRAM(s) Oral three times a day  gabapentin 200 milliGRAM(s) Oral three times a day  hydrALAZINE 50 milliGRAM(s) Oral every 8 hours  hydrALAZINE Injectable 5 milliGRAM(s) IV Push once  HYDROmorphone PCA (1 mG/mL) 30 milliLiter(s) PCA Continuous PCA Continuous  ketorolac   Injectable 15 milliGRAM(s) IV Push every 6 hours  labetalol 200 milliGRAM(s) Oral three times a day  lactated ringers. 1000 milliLiter(s) (75 mL/Hr) IV Continuous <Continuous>  melatonin 6 milliGRAM(s) Oral at bedtime  nicotine - 21 mG/24Hr(s) Patch 1 patch Transdermal daily  NIFEdipine XL 90 milliGRAM(s) Oral daily  pantoprazole    Tablet 40 milliGRAM(s) Oral daily  polyethylene glycol 3350 17 Gram(s) Oral daily  QUEtiapine 100 milliGRAM(s) Oral at bedtime      PHYSICAL EXAM:  T(C): 36.4 (05-23-18 @ 05:43), Max: 36.9 (05-22-18 @ 13:48)  HR: 63 (05-23-18 @ 05:43) (63 - 82)  BP: 140/52 (05-23-18 @ 05:43) (108/42 - 140/52)  RR: 17 (05-23-18 @ 05:43) (17 - 18)  SpO2: 99% (05-23-18 @ 05:43) (96% - 100%)  Wt(kg): --  I&O's Summary    22 May 2018 07:01  -  23 May 2018 07:00  --------------------------------------------------------  IN: 0 mL / OUT: 1700 mL / NET: -1700 mL        JVP: Normal  Neck: supple  Lung: clear   CV: S1 S2 , Murmur:  Abd: soft  Ext: No edema  neuro: Awake / alert  Psych: flat affect  Skin: normal       LABS/DATA:    CARDIAC MARKERS:                                10.7   10.91 )-----------( 189      ( 22 May 2018 05:41 )             33.7     05-22    138  |  99  |  27<H>  ----------------------------<  162<H>  5.0   |  22  |  0.73    Ca    8.1<L>      22 May 2018 05:41    TPro  7.1  /  Alb  3.9  /  TBili  0.3  /  DBili  x   /  AST  12  /  ALT  11  /  AlkPhos  64  05-21    proBNP:   Lipid Profile:   HgA1c:   TSH:     TELE:  EKG:
patient having a lot of pain. otherwise no issues overnight. per nursing she utilizes all pain medication available to her    Vital Signs Last 24 Hrs  T(C): 36.8 (16 May 2018 01:48), Max: 37 (15 May 2018 18:26)  T(F): 98.2 (16 May 2018 01:48), Max: 98.6 (15 May 2018 18:26)  HR: 64 (16 May 2018 02:37) (63 - 100)  BP: 153/53 (16 May 2018 02:37) (149/59 - 193/81)  BP(mean): --  RR: 16 (16 May 2018 02:37) (16 - 18)  SpO2: 98% (16 May 2018 02:37) (75% - 100%)    NAD, alert  RLE: skin intact. unchanged.   +DF/PF. moving toes. SILT. 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 Jaime  - cards clearance: needs TTE/stress test  -FU med clearance  -WBAT  -pain control. will consider calling acute pain service
patient still with a lot of pain. otherwise no issues overnight.  pain meds adjusted yesterday per acute pain recs    T(C): 36.4 (05-17-18 @ 06:11), Max: 36.8 (05-16-18 @ 21:07)  HR: 60 (05-17-18 @ 06:11) (56 - 70)  BP: 175/58 (05-17-18 @ 06:11) (111/43 - 175/58)  RR: 18 (05-17-18 @ 06:11) (16 - 18)  SpO2: 100% (05-17-18 @ 06:11) (95% - 100%)  Wt(kg): --                          12.5   6.48  )-----------( 230      ( 16 May 2018 11:50 )             38.4     05-16    139  |  100  |  21  ----------------------------<  119<H>  4.3   |  28  |  0.62    Ca    8.9      16 May 2018 11:50  Phos  3.3     05-16  Mg     1.9     05-16    TPro  6.6  /  Alb  3.3  /  TBili  0.3  /  DBili  0.1  /  AST  15  /  ALT  9   /  AlkPhos  63  05-16        NAD, alert  RLE: skin intact. unchanged.   +DF/PF. moving toes. SILT. 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 Jaime  -Cards cleared  -FU med clearance  -WBAT  -pain control  -FU labs
CHIEF COMPLAINT: f/u right hip pain    SUBJECTIVE / OVERNIGHT EVENTS: Patient seen and examined. No acute events overnight. C/o of right hip pain constantly and asking for pain meds per nursing staff.    MEDICATIONS  (STANDING):  aspirin enteric coated 81 milliGRAM(s) Oral daily  calcium carbonate 1250 mG + Vitamin D (OsCal 500 + D) 1 Tablet(s) Oral two times a day  carvedilol 12.5 milliGRAM(s) Oral every 12 hours  docusate sodium 100 milliGRAM(s) Oral three times a day  heparin  Injectable 5000 Unit(s) SubCutaneous every 8 hours  hydrALAZINE 25 milliGRAM(s) Oral every 8 hours  hydrALAZINE Injectable 5 milliGRAM(s) IV Push once  melatonin 6 milliGRAM(s) Oral at bedtime  nicotine - 21 mG/24Hr(s) Patch 1 patch Transdermal daily  NIFEdipine XL 90 milliGRAM(s) Oral daily  senna 2 Tablet(s) Oral at bedtime  traMADol 50 milliGRAM(s) Oral every 8 hours    MEDICATIONS  (PRN):  acetaminophen   Tablet. 650 milliGRAM(s) Oral every 6 hours PRN Mild Pain (1 - 3)  ALBUTerol    90 MICROgram(s) HFA Inhaler 2 Puff(s) Inhalation every 6 hours PRN Shortness of Breath and/or Wheezing  HYDROmorphone   Tablet 2 milliGRAM(s) Oral every 3 hours PRN Moderate Pain (4 - 6)  HYDROmorphone   Tablet 4 milliGRAM(s) Oral every 3 hours PRN Severe Pain (7 - 10)  hydrOXYzine hydrochloride 50 milliGRAM(s) Oral every 6 hours PRN Anxiety  morphine  - Injectable 2 milliGRAM(s) IV Push every 4 hours PRN severe breakthrough pain unrelieved by oxycodone  tiotropium 18 MICROgram(s) Capsule 1 Capsule(s) Inhalation daily PRN shortness of breath or wheezing    VITALS:  T(F): 98 (05-16-18 @ 17:11), Max: 98.6 (05-15-18 @ 18:26)  HR: 67 (05-16-18 @ 17:11) (64 - 100)  BP: 166/64 (05-16-18 @ 17:11) (153/53 - 193/81)  RR: 18 (05-16-18 @ 17:11) (16 - 18)  SpO2: 100% (05-16-18 @ 17:11)    PHYSICAL EXAM:  GENERAL: NAD, well-groomed, well-developed  RESPIRATORY: few rhonchi  CARDIOVASCULAR: Regular rate and rhythm; 2/6 YOLY   GASTROINTESTINAL: Soft, Nontender, Nondistended; Bowel sounds present  EXTREMITIES:  2+ Peripheral Pulses, No clubbing, cyanosis, or edema  NERVOUS SYSTEM:  Alert & Oriented X3; Moving all 4 extremities; No gross sensory deficits    LABS:              12.5                 139  | 28   | 21           6.48  >-----------< 230     ------------------------< 119                   38.4                 4.3  | 100  | 0.62                                         Ca 8.9   Mg 1.9   Ph 3.3         TPro  6.6  /  Alb  3.3      TBili  0.3  /  DBili  0.1        AST  15  /  ALT  9             AlkPhos  63      INR: 0.99 ;    PT: 11.0 SEC;    PTT: 30.3 SEC    IMAGES REVIEWED:  < from: Transthoracic Echocardiogram (05.16.18 @ 07:30) >  CONCLUSIONS:  1. Mitral annular calcification, otherwise normal mitral  valve. Mild mitral regurgitation.  2. Aortic valve leaflet morphology not well visualized.  The valve is calcified. Peak transaortic valve gradient  equals 26 mm Hg, mean transaortic valve gradient equals 12  mm Hg, consistentwith probable mild to moderate aortic  stenosis. Mild aortic regurgitation.  3. Mildly dilated left atrium.  LA volume index = 41 cc/m2.  4. Normal left ventricular internal dimensions and wall  thicknesses.  5. Normal left ventricular systolic function. No segmental  wall motion abnormalities.  6. Normal right ventricular size and function.  7. Estimated right ventricular systolic pressure equals 43  mm Hg, assuming right atrial pressure equals 10 mm Hg,  consistent with mild pulmonary hypertension.    < end of copied text >    [ ] Consultant(s) Notes Reviewed:  [x] Care Discussed with Consultants/Other Providers: Orthopedic PA - discussed management GRAY and history of benzo abuse
CHIEF COMPLAINT: f/u     SUBJECTIVE / OVERNIGHT EVENTS:   no acute events o/n  pt sleeping but when awakened asked for Vicodin for pain  no apparent distress noted    MEDICATIONS  (STANDING):  aspirin enteric coated 81 milliGRAM(s) Oral daily  calcium carbonate 1250 mG + Vitamin D (OsCal 500 + D) 1 Tablet(s) Oral two times a day  docusate sodium 100 milliGRAM(s) Oral three times a day  gabapentin 200 milliGRAM(s) Oral three times a day  heparin  Injectable 5000 Unit(s) SubCutaneous every 8 hours  hydrALAZINE 50 milliGRAM(s) Oral every 8 hours  hydrALAZINE Injectable 5 milliGRAM(s) IV Push once  labetalol 200 milliGRAM(s) Oral three times a day  lidocaine   Patch 1 Patch Transdermal daily  melatonin 6 milliGRAM(s) Oral at bedtime  nicotine - 21 mG/24Hr(s) Patch 1 patch Transdermal daily  NIFEdipine XL 90 milliGRAM(s) Oral daily  QUEtiapine 100 milliGRAM(s) Oral at bedtime  senna 2 Tablet(s) Oral at bedtime  sodium chloride 0.9%. 1000 milliLiter(s) (100 mL/Hr) IV Continuous <Continuous>  traMADol 50 milliGRAM(s) Oral every 8 hours    MEDICATIONS  (PRN):  acetaminophen   Tablet. 650 milliGRAM(s) Oral every 6 hours PRN Mild Pain (1 - 3)  ALBUTerol    90 MICROgram(s) HFA Inhaler 2 Puff(s) Inhalation every 6 hours PRN Shortness of Breath and/or Wheezing  HYDROmorphone   Tablet 4 milliGRAM(s) Oral every 3 hours PRN Moderate Pain (4 - 6)  HYDROmorphone   Tablet 6 milliGRAM(s) Oral every 6 hours PRN Severe Pain (7 - 10)  morphine  - Injectable 2 milliGRAM(s) IV Push every 3 hours PRN breakthrough  QUEtiapine 50 milliGRAM(s) Oral every 6 hours PRN anxiety/insomnia  tiotropium 18 MICROgram(s) Capsule 1 Capsule(s) Inhalation daily PRN shortness of breath or wheezing  tiZANidine 2 milliGRAM(s) Oral every 6 hours PRN muscle spasm      VITALS:  T(F): 98.1 (05-19-18 @ 13:39), Max: 98.4 (05-19-18 @ 06:20)  HR: 69 (05-19-18 @ 13:39) (56 - 79)  BP: 113/60 (05-19-18 @ 13:39) (113/60 - 168/61)  RR: 18 (05-19-18 @ 13:39) (17 - 18)  SpO2: 96% (05-19-18 @ 13:39)  Wt(kg): --      CAPILLARY BLOOD GLUCOSE    PHYSICAL EXAM:  GENERAL: NAD,   RESPIRATORY: ctab no w/r/r  CARDIOVASCULAR: Regular rate and rhythm; 2/6 YOLY   GASTROINTESTINAL: Soft, Nontender, Nondistended; Bowel sounds present  EXTREMITIES:  2+ Peripheral Pulses, No clubbing, cyanosis, or edema  NERVOUS SYSTEM:  Alert & Oriented X3; Moving all 4 extremities; No gross sensory deficits    LABS:              12.2                 139  | 30   | 28           5.56  >-----------< 269     ------------------------< 93                    38.0                 4.4  | 99   | 0.85                                         Ca 8.9   Mg x     Ph x            INR: 0.94 ;    PT: 10.8 SEC;    PTT: 29.6 SEC            RADIOLOGY & ADDITIONAL TESTS:  Imaging Personally Reviewed: [x] Yes    [ ] Consultant(s) Notes Reviewed:  [x] Care Discussed with Consultants/Other Providers: Orthopedic resident - discussed disposition

## 2018-05-25 NOTE — PROGRESS NOTE ADULT - PROBLEM SELECTOR PROBLEM 1
Avascular necrosis of femur head, right

## 2018-05-25 NOTE — PROGRESS NOTE ADULT - PROBLEM SELECTOR PLAN 5
-per psych recommendations are to avoid any benzos given history of benzo abuse  -d/c CIWA symptom triggered  -per sister, patient has not taken any benzos since 2016
-per psych recommendations are to avoid any benzos given history of benzo abuse  -off CIWA symptom triggered  -per sister, patient has not taken any benzos since 2016
-per psych recommendations are to avoid any benzos given history of benzo abuse  -off CIWA symptom triggered
-per psych recommendations are to avoid any benzos given history of benzo abuse  -off CIWA symptom triggered  -per sister, patient has not taken any benzos since 2016
-per psych recommendations are to avoid any benzos given history of benzo abuse
-per psych recommendations are to avoid any benzos given history of benzo abuse  -off CIWA symptom triggered  -per sister, patient has not taken any benzos since 2016
-per psych recommendations are to avoid any benzos given history of benzo abuse  -off CIWA symptom triggered  -per sister, patient has not taken any benzos since 2016

## 2018-05-25 NOTE — PROGRESS NOTE ADULT - PROBLEM SELECTOR PLAN 1
-per orthopedic team, CT without evidence of fluid to aspirate  -WBAT RLE  -2D echo and NST performed   -OR scheduled for 5/21
-per orthopedic team  -WBAT RLE  -OR scheduled for today  -Cardiology following, optimized for surgery
-per orthopedic team, CT without evidence of fluid to aspirate  -WBAT RLE  -2D echo and NST performed   -per cardiology recs "the patient is considered to have high risk ; no objection to proceed to OR as planned".    --> Surgery is cancelled today and has been rescheduled for Monday 5/21/2019. Surgeon reason related.
-per orthopedic team, CT without evidence of fluid to aspirate  -WBAT RLE  -2D echo and NST performed   -f/u cardiology for pre-op clearance for possible OR in am
-per orthopedic team, CT without evidence of fluid to aspirate  -WBAT RLE  -OR scheduled for 5/21  -Cards following, optimized for surgery
POD 1, R USAMA  -Care per orthopedic team  -WBAT RLE  -c/w dilaudid PCA per pain management - educated patient regarding use.
POD 2, R USAMA  -Care per orthopedic team  -WBAT RLE.  - consider increasing gabapentin as this can help as a mood stabilizer as well.
POD 4, R USAMA  -Care per orthopedic team  -WBAT RLE.  - c/w increased gabapentin
-per orthopedic plan to do infection workup with IR guided aspirate  -if negative, then plan for USAMA sometime this week  -WBAT RLE  -f/u cardiology for pre-op clearance  -2D echo performed given GRAY   -f/u NST results and cardiology recs

## 2018-05-25 NOTE — DISCHARGE NOTE ADULT - PATIENT PORTAL LINK FT
You can access the [facility] Patient Portal, offered by [facility], by registering with the following website: [email or web address]

## 2018-05-25 NOTE — DISCHARGE NOTE ADULT - CARE PROVIDER_API CALL
Ishan Jaime), Orthopaedic Surgery  [address] [address]  Phone: [phone]  Fax: [phone] Ishan Jaime), Orthopaedic Surgery  [address] [facility] [address]  [place] [address]  Phone: [phone]  Fax: [phone]    Domenico Cabrera), Cardiovascular Disease; Internal Medicine  [address] [facility] [address]  [place] [address]  Phone: [phone]  Fax: [phone]

## 2018-05-25 NOTE — PROGRESS NOTE ADULT - PROBLEM SELECTOR PLAN 3
-coreg switched to labetalol as per Cards today  -c/w hydralazine, nifedipine
-BP not controlled  -will increased hydralazine to 50mg q8hrs  -c/w procardia, coreg and hydralazine
-c/w hydralazine, nifedipine and labetalol
-BP not controlled  -will increased hydralazine to 50mg q8hrs  -c/w procardia, coreg and hydralazine
-c/w hydralazine, nifedipine and labetalol
-coreg switched to labetalol as per Cards on 5/19   -c/w hydralazine, nifedipine
-c/w procardia, coreg and hydralazine

## 2018-05-25 NOTE — PROGRESS NOTE ADULT - PROBLEM SELECTOR PLAN 8
-no evidence of withdrawal at this time, will monitor
c/w seroquel PRN
-no evidence of withdrawal at this time, will monitor
-no evidence of withdrawal at this time, will monitor
c/w seroquel PRN

## 2018-05-25 NOTE — DISCHARGE NOTE ADULT - INSTRUCTIONS
no change You have a post op appointment with Dr. Addison sofia     . If you are unable to keep this appointment, please call the office to reschedule. Call MD if you develop a fever, or if there is redness, swelling, drainage or pain not relieved by pain medication. No heavy lifting, bending, or straining to move your bowels. Take over the counter stool softeners as needed to prevent constipation which may be caused by pain medication. Try to quit smoking. You have a post op appointment with Dr. Jaime on  June 7, 2018 @ 11am   . If you are unable to keep this appointment, please call the office to reschedule. Call MD if you develop a fever, or if there is redness, swelling, drainage or pain not relieved by pain medication. No heavy lifting, bending, or straining to move your bowels. Take over the counter stool softeners as needed to prevent constipation which may be caused by pain medication. Try to quit smoking.

## 2018-05-25 NOTE — PROGRESS NOTE ADULT - PROBLEM SELECTOR PLAN 2
-c/w ASA 81mg daily
-c/w ASA 81mg daily
-c/w ASA 81mg daily.
-c/w ASA 81mg daily
-c/w ASA 81mg daily
-c/w ASA 81mg daily  -Cardiology following
-c/w ASA 81mg daily  -Cardiology following
-c/w ASA 81mg daily.
-c/w ASA 81mg daily.

## 2018-05-25 NOTE — DISCHARGE NOTE ADULT - PLAN OF CARE
pain control, surgical site healing, ambulation, return to ADL's Pt is s/p removal of hardware and conversion to Right USAMA on 5/21/18  without any intraoperative complications.  Pt is doing well and stable for discharge.  Pt is tolerating physical therapy: WBAT with cane/walker, POSTERIOR TOTAL HIP PRECAUTIONS, gait training.  Leave dressing on until post op office visit(POD#14).  Have sutures/staples removed POD#14 if present.  Pt is on enteric coated ASA 325mg PO BID for DVT prophylaxis take for 6 weeks unless otherwise directed by surgeon.  follow up with Dr. Jaime in two weeks.  Follow up with primary care doctor in 1-2 weeks for continuity of care. Pt is s/p removal of hardware and conversion to Right USAMA on 5/21/18  without any intraoperative complications.  Pt is doing well and stable for discharge.  Pt is tolerating physical therapy: WBAT with cane/walker, POSTERIOR TOTAL HIP PRECAUTIONS, gait training.  Leave dressing on until post op office visit(POD#14).  Have sutures/staples removed POD#14 if present.  Pt is on enteric coated ASA 325mg PO BID for DVT prophylaxis take for 6 weeks unless otherwise directed by surgeon.  follow up with Dr. Jaime in two weeks.  Follow up with primary care doctor in 1-2 weeks for continuity of care, cardiac medications have been adjusted as per Dr. Cabrera of cardiology.

## 2018-05-25 NOTE — DISCHARGE NOTE ADULT - MEDICATION SUMMARY - MEDICATIONS TO TAKE
I will START or STAY ON the medications listed below when I get home from the hospital:    aspirin 325 mg oral delayed release tablet  -- 1 tab(s) by mouth 2 times a day  -- Indication: For Blood clot prevention    HYDROmorphone 4 mg oral tablet  -- 1 tab(s) by mouth every 3 hours, As needed, Moderate Pain (4 - 6)  -- Indication: For pain    HYDROmorphone 2 mg oral tablet  -- 3 tab(s) by mouth every 3 hours, As needed, Severe Pain (7 - 10)  -- Indication: For pain    acetaminophen 325 mg oral tablet  -- 2 tab(s) by mouth every 6 hours, As needed, Mild Pain (1 - 3)  -- Indication: For pain    gabapentin 300 mg oral capsule  -- 1 cap(s) by mouth 3 times a day  -- Indication: For pain    QUEtiapine 50 mg oral tablet  -- 1 tab(s) by mouth every 6 hours, As needed, anxiety/insomnia  -- Indication: For Anxiety/insomnia    QUEtiapine 50 mg oral tablet  -- 3 tab(s) by mouth once a day (at bedtime)  -- Indication: For Anxiety/insomnia    QUEtiapine 50 mg oral tablet  -- 1 tab(s) by mouth once a day  -- Indication: For Anxiety/insomnia    labetalol 200 mg oral tablet  -- 1 tab(s) by mouth 3 times a day  -- Indication: For Hypertension    ipratropium-albuterol 0.5 mg-2.5 mg/3 mLinhalation solution  -- 3 milliliter(s) inhaled 4 times a day, As Needed - for shortness of breath and/or wheezing  -- Indication: For Home med    NIFEdipine 90 mg oral tablet, extended release  -- 1 tab(s) by mouth once a day  -- Indication: For Home med    senna oral tablet  -- 2 tab(s) by mouth once a day (at bedtime), As needed, Constipation  -- Indication: For laxative     docusate sodium 100 mg oral capsule  -- 1 cap(s) by mouth 3 times a day  -- Indication: For Stool softener    polyethylene glycol 3350 oral powder for reconstitution  -- 17 gram(s) by mouth once a day  -- Indication: For laxative     tiZANidine 2 mg oral tablet  -- 1 tab(s) by mouth every 6 hours, As needed, muscle spasm  -- Indication: For muscle relaxer    pantoprazole 40 mg oral delayed release tablet  -- 1 tab(s) by mouth once a day  -- Indication: For GI protection    nicotine 21 mg/24 hr transdermal film, extended release  -- 1 patch by transdermal patch once a day  -- Indication: For Smoking cessastion    hydrALAZINE 50 mg oral tablet  -- 1 tab(s) by mouth every 8 hours  -- Indication: For Hypertension    Os-Jai 250 with D oral tablet  -- 1 tab(s) by mouth 3 times a day  -- Indication: For Supplement    folic acid 1 mg oral tablet  -- 1 tab(s) by mouth once a day  -- Indication: For Supplement    thiamine 100 mg oral tablet  -- 1 tab(s) by mouth once a day  -- Indication: For Supplement

## 2018-05-25 NOTE — DISCHARGE NOTE ADULT - NS AS DC FOLLOWUP STROKE INST
Influenza vaccination (VIS Pub Date: August 7, 2015)/Smoking Cessation/total hip, exercise worksheet, posterior hip precautions, quit smoking, Smoking Cessation/total hip, exercise worksheet, posterior hip precautions, quit smoking,, ecotrin, dilaudid, gabapentin

## 2018-05-25 NOTE — PROGRESS NOTE ADULT - PROVIDER SPECIALTY LIST ADULT
Cardiology
Hospitalist
Orthopedics
Pain Medicine
Hospitalist

## 2018-05-25 NOTE — DISCHARGE NOTE ADULT - HOSPITAL COURSE
Pt is s/p removal of hardware and conversion to Right USAMA on 5/21/18  without any intraoperative complications.  Pt is doing well and stable for discharge.  Pt is tolerating physical therapy: WBAT with cane/walker, POSTERIOR TOTAL HIP PRECAUTIONS, gait training.  Leave dressing on until post op office visit(POD#14).  Have sutures/staples removed POD#14 if present.  Pt is on enteric coated ASA 325mg PO BID for DVT prophylaxis take for 6 weeks unless otherwise directed by surgeon.  follow up with Dr. Jaime in two weeks.  Follow up with primary care doctor in 1-2 weeks for continuity of care. Pt is s/p removal of hardware and conversion to Right USAMA on 5/21/18  without any intraoperative complications.  Pt is doing well and stable for discharge.  Pt is tolerating physical therapy: WBAT with cane/walker, POSTERIOR TOTAL HIP PRECAUTIONS, gait training.  Leave dressing on until post op office visit(POD#14).  Have sutures/staples removed POD#14 if present.  Pt is on enteric coated ASA 325mg PO BID for DVT prophylaxis take for 6 weeks unless otherwise directed by surgeon.  follow up with Dr. Jaime in two weeks.  Follow up with primary care doctor in 1-2 weeks for continuity of care, cardiac medications have been adjusted as per Dr. Cabrera of cardiology.

## 2018-05-25 NOTE — DISCHARGE NOTE ADULT - MEDICATION SUMMARY - MEDICATIONS TO CHANGE
I will SWITCH the dose or number of times a day I take the medications listed below when I get home from the hospital:    aspirin 81 mg oral tablet  -- 1 tab(s) by mouth once a day    aspirin 81 mg oral delayed release tablet  -- 1 tab(s) by mouth once a day    hydrALAZINE 25 mg oral tablet  -- 1 tab(s) by mouth every 8 hours

## 2018-05-25 NOTE — DISCHARGE NOTE ADULT - CARE PROVIDERS DIRECT ADDRESSES
,[email or web address]_Unknown

## 2018-05-27 LAB
CULTURE - SURGICAL SITE: SIGNIFICANT CHANGE UP
CULTURE - SURGICAL SITE: SIGNIFICANT CHANGE UP

## 2018-05-29 LAB — SURGICAL PATHOLOGY STUDY: SIGNIFICANT CHANGE UP

## 2018-05-30 ENCOUNTER — OUTPATIENT (OUTPATIENT)
Dept: OUTPATIENT SERVICES | Facility: HOSPITAL | Age: 73
LOS: 1 days | End: 2018-05-30
Payer: MEDICARE

## 2018-05-30 DIAGNOSIS — Z98.89 OTHER SPECIFIED POSTPROCEDURAL STATES: Chronic | ICD-10-CM

## 2018-05-30 DIAGNOSIS — I82.409 ACUTE EMBOLISM AND THROMBOSIS OF UNSPECIFIED DEEP VEINS OF UNSPECIFIED LOWER EXTREMITY: ICD-10-CM

## 2018-05-30 PROCEDURE — 93970 EXTREMITY STUDY: CPT

## 2018-05-30 PROCEDURE — 93970 EXTREMITY STUDY: CPT | Mod: 26

## 2018-06-22 ENCOUNTER — APPOINTMENT (OUTPATIENT)
Dept: ORTHOPEDIC SURGERY | Facility: CLINIC | Age: 73
End: 2018-06-22
Payer: MEDICARE

## 2018-06-22 VITALS
SYSTOLIC BLOOD PRESSURE: 116 MMHG | DIASTOLIC BLOOD PRESSURE: 61 MMHG | HEIGHT: 62 IN | BODY MASS INDEX: 22.56 KG/M2 | HEART RATE: 66 BPM | WEIGHT: 122.6 LBS

## 2018-06-22 DIAGNOSIS — R60.0 LOCALIZED EDEMA: ICD-10-CM

## 2018-06-22 PROCEDURE — 99024 POSTOP FOLLOW-UP VISIT: CPT

## 2018-06-22 PROCEDURE — 73502 X-RAY EXAM HIP UNI 2-3 VIEWS: CPT | Mod: RT

## 2018-06-22 RX ORDER — CEPHALEXIN 500 MG/1
500 TABLET ORAL 3 TIMES DAILY
Qty: 21 | Refills: 0 | Status: ACTIVE | COMMUNITY
Start: 2018-06-22 | End: 1900-01-01

## 2018-06-22 RX ORDER — OXYCODONE AND ACETAMINOPHEN 5; 325 MG/1; MG/1
5-325 TABLET ORAL EVERY 6 HOURS
Qty: 40 | Refills: 0 | Status: ACTIVE | COMMUNITY
Start: 2018-06-22 | End: 1900-01-01

## 2018-06-22 RX ORDER — ASPIRIN/ACETAMINOPHEN/CAFFEINE 500-325-65
325 POWDER IN PACKET (EA) ORAL
Qty: 60 | Refills: 0 | Status: ACTIVE | COMMUNITY
Start: 2018-06-22 | End: 1900-01-01

## 2018-06-22 RX ORDER — DICLOFENAC SODIUM 75 MG/1
75 TABLET, DELAYED RELEASE ORAL
Qty: 30 | Refills: 0 | Status: ACTIVE | COMMUNITY
Start: 2018-06-22 | End: 1900-01-01

## 2018-06-27 ENCOUNTER — INPATIENT (INPATIENT)
Facility: HOSPITAL | Age: 73
LOS: 0 days | Discharge: ANOTHER HOSPITAL | End: 2018-06-28
Attending: ORTHOPAEDIC SURGERY | Admitting: ORTHOPAEDIC SURGERY
Payer: MEDICARE

## 2018-06-27 ENCOUNTER — MOBILE ON CALL (OUTPATIENT)
Age: 73
End: 2018-06-27

## 2018-06-27 VITALS
DIASTOLIC BLOOD PRESSURE: 64 MMHG | TEMPERATURE: 99 F | OXYGEN SATURATION: 95 % | RESPIRATION RATE: 16 BRPM | HEART RATE: 80 BPM | SYSTOLIC BLOOD PRESSURE: 179 MMHG

## 2018-06-27 DIAGNOSIS — Z98.89 OTHER SPECIFIED POSTPROCEDURAL STATES: Chronic | ICD-10-CM

## 2018-06-27 DIAGNOSIS — S72.143A DISPLACED INTERTROCHANTERIC FRACTURE OF UNSPECIFIED FEMUR, INITIAL ENCOUNTER FOR CLOSED FRACTURE: ICD-10-CM

## 2018-06-27 LAB
APPEARANCE UR: CLEAR — SIGNIFICANT CHANGE UP
APTT BLD: 27.6 SEC — SIGNIFICANT CHANGE UP (ref 27.5–37.4)
BILIRUB UR-MCNC: NEGATIVE — SIGNIFICANT CHANGE UP
BLD GP AB SCN SERPL QL: NEGATIVE — SIGNIFICANT CHANGE UP
BLOOD UR QL VISUAL: HIGH
BUN SERPL-MCNC: 24 MG/DL — HIGH (ref 7–23)
CALCIUM SERPL-MCNC: 8.5 MG/DL — SIGNIFICANT CHANGE UP (ref 8.4–10.5)
CHLORIDE SERPL-SCNC: 97 MMOL/L — LOW (ref 98–107)
CO2 SERPL-SCNC: 23 MMOL/L — SIGNIFICANT CHANGE UP (ref 22–31)
COLOR SPEC: SIGNIFICANT CHANGE UP
CREAT SERPL-MCNC: 0.99 MG/DL — SIGNIFICANT CHANGE UP (ref 0.5–1.3)
GLUCOSE SERPL-MCNC: 123 MG/DL — HIGH (ref 70–99)
GLUCOSE UR-MCNC: NEGATIVE — SIGNIFICANT CHANGE UP
HCT VFR BLD CALC: 26.7 % — LOW (ref 34.5–45)
HGB BLD-MCNC: 8.6 G/DL — LOW (ref 11.5–15.5)
INR BLD: 1.16 — SIGNIFICANT CHANGE UP (ref 0.88–1.17)
KETONES UR-MCNC: NEGATIVE — SIGNIFICANT CHANGE UP
LEUKOCYTE ESTERASE UR-ACNC: SIGNIFICANT CHANGE UP
MCHC RBC-ENTMCNC: 30.2 PG — SIGNIFICANT CHANGE UP (ref 27–34)
MCHC RBC-ENTMCNC: 32.2 % — SIGNIFICANT CHANGE UP (ref 32–36)
MCV RBC AUTO: 93.7 FL — SIGNIFICANT CHANGE UP (ref 80–100)
MUCOUS THREADS # UR AUTO: SIGNIFICANT CHANGE UP
NITRITE UR-MCNC: NEGATIVE — SIGNIFICANT CHANGE UP
NRBC # FLD: 0 — SIGNIFICANT CHANGE UP
PH UR: 5.5 — SIGNIFICANT CHANGE UP (ref 4.6–8)
PLATELET # BLD AUTO: 310 K/UL — SIGNIFICANT CHANGE UP (ref 150–400)
PMV BLD: 9.5 FL — SIGNIFICANT CHANGE UP (ref 7–13)
POTASSIUM SERPL-MCNC: 4.1 MMOL/L — SIGNIFICANT CHANGE UP (ref 3.5–5.3)
POTASSIUM SERPL-SCNC: 4.1 MMOL/L — SIGNIFICANT CHANGE UP (ref 3.5–5.3)
PROT UR-MCNC: 30 MG/DL — HIGH
PROTHROM AB SERPL-ACNC: 13.4 SEC — HIGH (ref 9.8–13.1)
RBC # BLD: 2.85 M/UL — LOW (ref 3.8–5.2)
RBC # FLD: 14.3 % — SIGNIFICANT CHANGE UP (ref 10.3–14.5)
RBC CASTS # UR COMP ASSIST: SIGNIFICANT CHANGE UP (ref 0–?)
RH IG SCN BLD-IMP: POSITIVE — SIGNIFICANT CHANGE UP
SODIUM SERPL-SCNC: 134 MMOL/L — LOW (ref 135–145)
SP GR SPEC: 1.01 — SIGNIFICANT CHANGE UP (ref 1–1.04)
UROBILINOGEN FLD QL: NORMAL MG/DL — SIGNIFICANT CHANGE UP
WBC # BLD: 10.18 K/UL — SIGNIFICANT CHANGE UP (ref 3.8–10.5)
WBC # FLD AUTO: 10.18 K/UL — SIGNIFICANT CHANGE UP (ref 3.8–10.5)
WBC UR QL: SIGNIFICANT CHANGE UP (ref 0–?)

## 2018-06-27 PROCEDURE — 99223 1ST HOSP IP/OBS HIGH 75: CPT

## 2018-06-27 PROCEDURE — 71045 X-RAY EXAM CHEST 1 VIEW: CPT | Mod: 26

## 2018-06-27 PROCEDURE — 73590 X-RAY EXAM OF LOWER LEG: CPT | Mod: 26,RT

## 2018-06-27 PROCEDURE — 73552 X-RAY EXAM OF FEMUR 2/>: CPT | Mod: 26,RT

## 2018-06-27 PROCEDURE — 73502 X-RAY EXAM HIP UNI 2-3 VIEWS: CPT | Mod: 26,RT

## 2018-06-27 PROCEDURE — 93010 ELECTROCARDIOGRAM REPORT: CPT

## 2018-06-27 PROCEDURE — 73620 X-RAY EXAM OF FOOT: CPT | Mod: 26,RT

## 2018-06-27 RX ORDER — NICOTINE POLACRILEX 2 MG
1 GUM BUCCAL DAILY
Qty: 0 | Refills: 0 | Status: DISCONTINUED | OUTPATIENT
Start: 2018-06-27 | End: 2018-06-28

## 2018-06-27 RX ORDER — DEXTROSE MONOHYDRATE, SODIUM CHLORIDE, AND POTASSIUM CHLORIDE 50; .745; 4.5 G/1000ML; G/1000ML; G/1000ML
1000 INJECTION, SOLUTION INTRAVENOUS
Qty: 0 | Refills: 0 | Status: DISCONTINUED | OUTPATIENT
Start: 2018-06-27 | End: 2018-06-27

## 2018-06-27 RX ORDER — MORPHINE SULFATE 50 MG/1
2 CAPSULE, EXTENDED RELEASE ORAL ONCE
Qty: 0 | Refills: 0 | Status: DISCONTINUED | OUTPATIENT
Start: 2018-06-27 | End: 2018-06-27

## 2018-06-27 RX ORDER — MORPHINE SULFATE 50 MG/1
2 CAPSULE, EXTENDED RELEASE ORAL EVERY 4 HOURS
Qty: 0 | Refills: 0 | Status: DISCONTINUED | OUTPATIENT
Start: 2018-06-27 | End: 2018-06-28

## 2018-06-27 RX ORDER — OXYCODONE HYDROCHLORIDE 5 MG/1
10 TABLET ORAL EVERY 4 HOURS
Qty: 0 | Refills: 0 | Status: DISCONTINUED | OUTPATIENT
Start: 2018-06-27 | End: 2018-06-28

## 2018-06-27 RX ORDER — SODIUM CHLORIDE 9 MG/ML
1000 INJECTION INTRAMUSCULAR; INTRAVENOUS; SUBCUTANEOUS
Qty: 0 | Refills: 0 | Status: DISCONTINUED | OUTPATIENT
Start: 2018-06-27 | End: 2018-06-28

## 2018-06-27 RX ORDER — OXYCODONE HYDROCHLORIDE 5 MG/1
5 TABLET ORAL EVERY 4 HOURS
Qty: 0 | Refills: 0 | Status: DISCONTINUED | OUTPATIENT
Start: 2018-06-27 | End: 2018-06-28

## 2018-06-27 RX ORDER — HALOPERIDOL DECANOATE 100 MG/ML
1 INJECTION INTRAMUSCULAR EVERY 6 HOURS
Qty: 0 | Refills: 0 | Status: DISCONTINUED | OUTPATIENT
Start: 2018-06-27 | End: 2018-06-28

## 2018-06-27 RX ADMIN — OXYCODONE HYDROCHLORIDE 10 MILLIGRAM(S): 5 TABLET ORAL at 21:10

## 2018-06-27 RX ADMIN — MORPHINE SULFATE 2 MILLIGRAM(S): 50 CAPSULE, EXTENDED RELEASE ORAL at 22:05

## 2018-06-27 RX ADMIN — OXYCODONE HYDROCHLORIDE 10 MILLIGRAM(S): 5 TABLET ORAL at 20:12

## 2018-06-27 RX ADMIN — SODIUM CHLORIDE 75 MILLILITER(S): 9 INJECTION INTRAMUSCULAR; INTRAVENOUS; SUBCUTANEOUS at 23:18

## 2018-06-27 RX ADMIN — HALOPERIDOL DECANOATE 1 MILLIGRAM(S): 100 INJECTION INTRAMUSCULAR at 22:57

## 2018-06-27 RX ADMIN — MORPHINE SULFATE 2 MILLIGRAM(S): 50 CAPSULE, EXTENDED RELEASE ORAL at 22:20

## 2018-06-27 NOTE — CONSULT NOTE ADULT - PROBLEM SELECTOR RECOMMENDATION 5
- elevated since admission  - suggest continuing PTA medications from transfer discharge summary - losartan, nifedipine, carvedilol (along with holding parameters) - approximately 58 years; started at age 16 years - 1ppd  - previously diagnosed with emphysema  - lung sounds coarse and distant; now wheezing  - O2 Sat - 95% on RA  - offered nicotine patch, and accepts same, commenting on wanting to start after surgery  - in the setting of pneumonia and smoking, suggesting BiPAP post surgery, as well as bronchodilators; steroids if acute exacerbation  - follow pulse oximetry  - HOB elevation

## 2018-06-27 NOTE — PATIENT PROFILE ADULT. - AS SC BRADEN NUTRITION
Left message for patient to call the office regarding test results. My office hours were provided. [facility] mammogram-benign  
Letter sent with copy of test results.   
(3) adequate

## 2018-06-27 NOTE — CONSULT NOTE ADULT - PROBLEM SELECTOR RECOMMENDATION 9
- repeated fracture during mechanical fall on 06/25/2018, after repair on 05/21/2018  - previously ambulatory without restrictions  - ECG (current) and stress test study of 05/16/2018 without any significant findings  - Functional status > 4METS  - Revised cardiac risk index for pre-operative risk - Class I; 0.4 % risk of major cardiac event  - Chronic smoker; 56 years at 1ppd  - Patient assessed to be moderate to high risk for surgical intervention of the right hip  - suggest BiPAP on standby post surgery  - suggest incentive spirometry post surgery  - DVT prophylaxis, physical therapy as per orthopedic team's assessment - suggest lovenox or heparin SQ  - PT post surgery when optimal

## 2018-06-27 NOTE — CONSULT NOTE ADULT - PROBLEM SELECTOR RECOMMENDATION 7
- suggest continuing medications as prior to transfer (aside from analgesics)  - suggest starting PPI because of CT findings above - appears multifactorial  - macrocytosis improving, possibly due to the vit-B12 injections referenced in transfer note (stated appear to be helping MDD)  - RDW within acceptable range  - microscopic hematuria present  - follow trend with repeat lab-work  - type and cross and pRBC on standby in preparation for surgery  - f/u anemia profile in the AM

## 2018-06-27 NOTE — CONSULT NOTE ADULT - PROBLEM SELECTOR RECOMMENDATION 6
- appears multifactorial  - macrocytosis improving, possibly due to the vit-B12 injections referenced in transfer note (stated appear to be helping MDD)  - RDW within acceptable range  - microscopic hematuria present  - follow trend with repeat lab-work  - type and cross and pRBC on standby in preparation for surgery  - f/u anemia profile in the AM - elevated since admission  - suggest continuing PTA medications from transfer discharge summary - losartan, nifedipine, carvedilol (along with holding parameters)

## 2018-06-27 NOTE — CONSULT NOTE ADULT - ASSESSMENT
72 year old female, with past history inclusive of HTN, CAD - s/p multiple stents (remote), Emphysema, Alcohol abuse, Anxiety, R-femoral neck fracture (s/p [facility], 10/2017), Repeated R-Hip USAMA (05/21/2018), Bladder repair, and other history as indicated below, presented to the floor, as direct admit, secondary to repeated fracture of the right USAMA.  Vital signs upon floor presentation as follows: BP = 179/64, HR = 80, RR = 16, T = 37.2 C (98.9 F), O2 Sat = 95% on RA.  Admitted for surgical intervention of Re-fractured Right Total Hip Arthroplasty.

## 2018-06-27 NOTE — PATIENT PROFILE ADULT. - URINARY CATHETER
Admitted from/yes Admitted from [facility] on 6/27/yes

## 2018-06-27 NOTE — CONSULT NOTE ADULT - ATTENDING COMMENTS
Thank you for allowing us to participate in the care of this patient.  Please do not hesitate to call if there are questions.

## 2018-06-27 NOTE — CONSULT NOTE ADULT - PROBLEM SELECTOR RECOMMENDATION 3
- noted on CT scan from transferring hospital  - consideration for GERD, which is likely contributing toward RML pneumonia  - not on PPI, but would suggest same, for a timed duration  - suggest GI evaluation post surgery, as outpatient - diagnosed with RML infection while at [facility] - per CT findings referenced above (does not appear to be evidenced on CXR, even from transferring hospital)  - was on levofloxacin 250 mg daily for total 5 days (unsure when started); suggest at least 3 more days of abx  - would restart bronchodilator therapy as PTA  - follow pulse oximetry  - HOB elevation, as tolerated  - ensure optimal hydration

## 2018-06-27 NOTE — CONSULT NOTE ADULT - PROBLEM SELECTOR RECOMMENDATION 2
- diagnosed with RML infection while at [facility] - per CT findings referenced above (does not appear to be evidenced on CXR, even from transferring hospital)  - was on levofloxacin 250 mg daily for total 5 days (unsure when started); suggest at least 3 more days of abx  - would restart bronchodilator therapy as PTA  - follow pulse oximetry  - HOB elevation, as tolerated  - ensure optimal hydration - appears to be experiencing significant pain when seen  - on significant amounts of opioid in the past so be tolerant of usual doses of opiate meds  - suggest dilaudid 1 mg Q4H PRN severe pain and 0.5 mg Q4H PRN moderate   - BP, anxiety, restlessness should improve when pain adequately controlled  - bowel regimen  - last bowel movement reported to be on day PTA (by patient)  - f/u TSH level in the AM  - ensure optimal hydration

## 2018-06-27 NOTE — CONSULT NOTE ADULT - SUBJECTIVE AND OBJECTIVE BOX
72 year old female, with past history inclusive of HTN, CAD - s/p multiple stents (remote), Emphysema, Alcohol abuse, Anxiety, R-femoral neck fracture (s/p [facility], 10/2017), Repeated R-Hip USAMA (2018), Bladder repair, and other history as indicated below, presented to the floor, as direct admit, secondary to repeated fracture of the right USAMA (per reports).  Seen and evaluated at bedside;          Vital signs upon floor presentation as follows: BP = 179/64, HR = 80, RR = 16, T = 37.2 C (98.9 F), O2 Sat = 95% on RA.  Admitted for surgical intervention of Right Total Hip Arthroplasty.      PAST MEDICAL & SURGICAL HISTORY:  ·	Pain of right hip joint  ·	Migraine  ·	Alcohol abuse  ·	Seizure (due to Benzodiazepine withdrawal)  ·	Stented coronary artery  ·	Coronary artery disease  ·	Anxiety  ·	HTN (hypertension)  ·	Emphysema, unspecified  ·	Anxiety  ·	Migraine  ·	CAD (coronary artery disease)  ·	Hyperlipidemia  ·	Hypertension  ·	Benzodiazepine abuse  ·	No significant past surgical history  ·	S/P bladder repair    FAMILY HISTORY:  ·	Family history of coronary artery disease in daughter (Child)    SOCIAL HISTORY  ·	Marital Status:  (   )    (   ) Single    (   )    (  )   ·	Occupation:   ·	Lives with: (  ) alone  (  ) children   (  ) spouse   (  ) parents  (  ) other  ·	  ·	Substance Use (street drugs): (  ) never used  (  ) other:  ·	Tobacco Usage:  (   ) never smoked   (   ) former smoker   (   ) current smoker  (     ) pack year  (        ) last cigarette date  ·	Alcohol Usage:  ·	Sexual History:   ·	  ·	    ALLERGIES/INTOLERANCES:  ·	No Known Allergies    MEDICATIONS  (STANDING):  ·	dextrose 5% + sodium chloride 0.45% with potassium chloride 20 mEq/L 1000 milliLiter(s) (75 mL/Hr) IV Continuous <Continuous>  ·	morphine  - Injectable 2 milliGRAM(s) IV Push once    MEDICATIONS  (PRN):  ·	morphine  - Injectable 2 milliGRAM(s) IV Push every 4 hours PRN Severe Pain (7 - 10)  ·	oxyCODONE    IR 10 milliGRAM(s) Oral every 4 hours PRN Moderate Pain  ·	oxyCODONE    IR 5 milliGRAM(s) Oral every 4 hours PRN Mild Pain      REVIEW OF SYSTEMS:    ·	CONSTITUTIONAL: No weakness, fevers or chills  ·	EYES/ENT: No visual changes;  No dysphagia  ·	NECK: No pain or stiffness,  ·	RESPIRATORY: No cough, hemoptysis; No shortness of breath  ·	CARDIOVASCULAR: No chest pain or palpitations; No lower extremity edema  ·	GASTROINTESTINAL: No abdominal or epigastric pain. No nausea, vomiting, or hematemesis; No diarrhea or constipation. No melena or hematochezia.  ·	GENITOURINARY: No dysuria, frequency or hematuria  ·	MUSCULOSKELETAL: No joint pain, swelling, decreased ROM, erythema, warmth  ·	NEUROLOGICAL: No numbness or weakness  ·	SKIN: No itching, burning, rashes, or lesions   ·	All other review of systems is negative unless indicated above.      LAB-WORK/STUDIES:                      8.6    10.18 )-----------( 310      ( 2018 20:00 )             26.7     2018 20:00    134    |  97     |  24     ----------------------------<  123    4.1     |  23     |  0.99     Ca    8.5        2018 20:00        PT/INR - ( 2018 20:00 )   PT: 13.4 SEC;   INR: 1.16          PTT - ( 2018 20:00 )  PTT:27.6 SEC  CAPILLARY BLOOD GLUCOSE      Urinalysis Basic - ( 2018 20:11 )    Color: PLYEL / Appearance: CLEAR / S.011 / pH: 5.5  Gluc: NEGATIVE / Ketone: NEGATIVE  / Bili: NEGATIVE / Urobili: NORMAL mg/dL   Blood: MODERATE / Protein: 30 mg/dL / Nitrite: NEGATIVE   Leuk Esterase: TRACE / RBC: 10-25 / WBC 2-5   Sq Epi: x / Non Sq Epi: x / Bacteria: x      PHYSICAL EXAM:  ·	GENERAL: NAD, well-developed  ·	HEAD:  Atraumatic, Normocephalic  ·	EYES: EOMI, PERRL, conjunctiva and sclera clear  ·	NECK: Supple, No JVD  ·	CHEST/LUNG: Clear to auscultation bilaterally; No wheeze  ·	HEART: Regular rate and rhythm; No murmurs, rubs, or gallops  ·	ABDOMEN: Soft, Nontender, Nondistended; Bowel sounds present  ·	MUSCULOSKELETAL: No muscle wasting, Good ROM  ·	EXTREMITIES:  2+ Peripheral Pulses, No clubbing, cyanosis, or edema  ·	PSYCH: AAOx3  ·	NEUROLOGY: non-focal  ·	SKIN: No rashes or lesions 72 year old female, with past history inclusive of HTN, CAD - s/p multiple stents (remote), Emphysema, Alcohol abuse, Anxiety, R-femoral neck fracture (s/p CRPP - [facility], 10/2017), Repeated R-Hip USAMA (2018), Bladder repair, and other history as indicated below, presented to the floor, as direct admit, secondary to repeated fracture of the right USAMA (per reports).  Seen and evaluated at bedside; restless and appears to be experiencing significant pain of the RLE.  Patient is only able to provide limited information because of stress due to severe pain.  States that she tripped over a "Bullet" garbage lid in the kitchen and fell, striking the occiput; surmises that the floor might have been wet also.  No reports of LOC, dizziness, chest pain.  Patient was transferred to [facility] by EMS, and documentation shows daughter's authorization for subsequent transfer to [facility].      Vital signs upon floor presentation as follows: BP = 179/64, HR = 80, RR = 16, T = 37.2 C (98.9 F), O2 Sat = 95% on RA.  Admitted for surgical intervention of Right Total Hip Arthroplasty.    PAST MEDICAL & SURGICAL HISTORY:  ·	Pain of right hip joint  ·	Migraine  ·	Alcohol abuse  ·	Seizure (due to Benzodiazepine withdrawal)  ·	Stented coronary artery  ·	Coronary artery disease  ·	Anxiety  ·	HTN (hypertension)  ·	Emphysema, unspecified  ·	Anxiety  ·	Migraine  ·	CAD (coronary artery disease)  ·	Hyperlipidemia  ·	Hypertension  ·	Benzodiazepine abuse  ·	No significant past surgical history  ·	S/P bladder repair    FAMILY HISTORY:  ·	Family history of coronary artery disease in daughter (Child)    SOCIAL HISTORY  ·	Maried  ·	Lives with  (but reports  currently admitted in [facility] because of fracture)  ·	Retired  ·	History of smoking cigarettes; 1ppd since age 16 years (~ 58 years).  Accepts offer for nicotine patch, but wants it to start after surgery  ·	History of alcohol abuse (per chart documentation)  ·	History of benzodiazepine abuse w/ associated seizure (per chart documentation)    ALLERGIES/INTOLERANCES:  ·	Cat hair extract - itching  ·	Pollen extract - itching    MEDICATIONS  (STANDING):  ·	dextrose 5% + sodium chloride 0.45% with potassium chloride 20 mEq/L 1000 milliLiter(s) (75 mL/Hr) IV Continuous <Continuous>  ·	morphine  - Injectable 2 milliGRAM(s) IV Push once    MEDICATIONS  (PRN):  ·	morphine  - Injectable 2 milliGRAM(s) IV Push every 4 hours PRN Severe Pain (7 - 10)  ·	oxyCODONE    IR 10 milliGRAM(s) Oral every 4 hours PRN Moderate Pain  ·	oxyCODONE    IR 5 milliGRAM(s) Oral every 4 hours PRN Mild Pain      REVIEW OF SYSTEMS:    ·	CONSTITUTIONAL: No weakness, fevers or chills  ·	EYES/ENT: No visual changes;  No dysphagia  ·	NECK: No pain or stiffness,  ·	RESPIRATORY: No cough, hemoptysis; No shortness of breath  ·	CARDIOVASCULAR: No chest pain or palpitations; No lower extremity edema  ·	GASTROINTESTINAL: No abdominal or epigastric pain. No nausea, vomiting, or hematemesis; No diarrhea or constipation. No melena or hematochezia.  ·	GENITOURINARY: No dysuria, frequency or hematuria  ·	MUSCULOSKELETAL: No joint pain, swelling, decreased ROM, erythema, warmth  ·	NEUROLOGICAL: No numbness or weakness  ·	SKIN: No itching, burning, rashes, or lesions   ·	All other review of systems is negative unless indicated above.      LAB-WORK/STUDIES:                      8.6    10.18 )-----------( 310      ( 2018 20:00 )             26.7     2018 20:00    134    |  97     |  24     ----------------------------<  123    4.1     |  23     |  0.99     Ca    8.5        2018 20:00        PT/INR - ( 2018 20:00 )   PT: 13.4 SEC;   INR: 1.16          PTT - ( 2018 20:00 )  PTT:27.6 SEC  CAPILLARY BLOOD GLUCOSE      Urinalysis Basic - ( 2018 20:11 )    Color: PLYEL / Appearance: CLEAR / S.011 / pH: 5.5  Gluc: NEGATIVE / Ketone: NEGATIVE  / Bili: NEGATIVE / Urobili: NORMAL mg/dL   Blood: MODERATE / Protein: 30 mg/dL / Nitrite: NEGATIVE   Leuk Esterase: TRACE / RBC: 10-25 / WBC 2-5   Sq Epi: x / Non Sq Epi: x / Bacteria: x      PHYSICAL EXAM:  ·	GENERAL: NAD, well-developed  ·	HEAD:  Atraumatic, Normocephalic  ·	EYES: EOMI, PERRL, conjunctiva and sclera clear  ·	NECK: Supple, No JVD  ·	CHEST/LUNG: Clear to auscultation bilaterally; No wheeze  ·	HEART: Regular rate and rhythm; No murmurs, rubs, or gallops  ·	ABDOMEN: Soft, Nontender, Nondistended; Bowel sounds present  ·	MUSCULOSKELETAL: No muscle wasting, Good ROM  ·	EXTREMITIES:  2+ Peripheral Pulses, No clubbing, cyanosis, or edema  ·	PSYCH: AAOx3  ·	NEUROLOGY: non-focal  ·	SKIN: No rashes or lesions 72 year old female, with past history inclusive of HTN, CAD - s/p multiple stents (remote), Emphysema, Alcohol abuse, Anxiety, R-femoral neck fracture (s/p CRPP - [facility], 10/2017), Repeated R-Hip USAMA (2018), Bladder repair, and other history as indicated below, presented to the floor, as direct admit, secondary to repeated fracture of the right USAMA (per reports).  Seen and evaluated at bedside; restless and appears to be experiencing significant pain of the RLE.  Patient is only able to provide limited information because of stress due to severe pain.  States that she tripped over a "Bullet" garbage lid in the kitchen and fell, striking the occiput; surmises that the floor might have been wet also.  No reports of LOC, dizziness, chest pain.  Patient was transferred to [facility] by EMS, and documentation shows daughter's authorization for subsequent transfer to [facility].      Vital signs upon floor presentation as follows: BP = 179/64, HR = 80, RR = 16, T = 37.2 C (98.9 F), O2 Sat = 95% on RA.  Admitted for surgical intervention of Right Total Hip Arthroplasty.    PAST MEDICAL & SURGICAL HISTORY:  ·	Pain of right hip joint  ·	Migraine  ·	Alcohol abuse  ·	Seizure (due to Benzodiazepine withdrawal)  ·	Coronary artery disease (s/p Stent)  ·	Anxiety  ·	Emphysema, unspecified  ·	Migraine  ·	Hyperlipidemia  ·	Hypertension  ·	HCAP  ·	Sepsis  ·	LAI  ·	Smoking  ·	Benzodiazepine abuse  ·	No significant past surgical history  ·	S/P bladder repair    FAMILY HISTORY:  ·	Family history of coronary artery disease in daughter (Child)    SOCIAL HISTORY  ·	  ·	Lives with  (but reports  currently admitted in [facility] because of fracture)  ·	Retired  ·	History of smoking cigarettes; 1ppd since age 16 years (~ 58 years).  Accepts offer for nicotine patch, but wants it to start after surgery  ·	History of alcohol abuse (per chart documentation)  ·	History of benzodiazepine abuse w/ associated seizure (per chart documentation)    ALLERGIES/INTOLERANCES:  ·	Cat hair extract - itching  ·	Pollen extract - itching    MEDICATIONS  (STANDING):  ·	dextrose 5% + sodium chloride 0.45% with potassium chloride 20 mEq/L 1000 milliLiter(s) (75 mL/Hr) IV Continuous <Continuous>  ·	morphine  - Injectable 2 milliGRAM(s) IV Push once    MEDICATIONS  (PRN):  ·	morphine  - Injectable 2 milliGRAM(s) IV Push every 4 hours PRN Severe Pain (7 - 10)  ·	oxyCODONE    IR 10 milliGRAM(s) Oral every 4 hours PRN Moderate Pain  ·	oxyCODONE    IR 5 milliGRAM(s) Oral every 4 hours PRN Mild Pain      REVIEW OF SYSTEMS:  ·	CONSTITUTIONAL: No weakness, fevers or chills  ·	EYES/ENT: No visual changes;  No dysphagia  ·	NECK: No pain or stiffness,  ·	RESPIRATORY: No cough, hemoptysis; No shortness of breath  ·	CARDIOVASCULAR: No chest pain or palpitations; No lower extremity edema  ·	GASTROINTESTINAL: No abdominal or epigastric pain. No nausea, vomiting, or hematemesis; No diarrhea or constipation. No melena or hematochezia.  ·	GENITOURINARY: No dysuria, frequency or hematuria  ·	MUSCULOSKELETAL: No joint pain, swelling, decreased ROM, erythema, warmth  ·	NEUROLOGICAL: No numbness or weakness  ·	SKIN: No itching, burning, rashes, or lesions   ·	All other review of systems is negative unless indicated above.      LAB-WORK/STUDIES:                      8.6    10.18 )-----------( 310      ( 2018 20:00 )             26.7     2018 20:00    134    |  97     |  24     ----------------------------<  123    4.1     |  23     |  0.99     Ca    8.5        2018 20:00        PT/INR - ( 2018 20:00 )   PT: 13.4 SEC;   INR: 1.16          PTT - ( 2018 20:00 )  PTT:27.6 SEC  CAPILLARY BLOOD GLUCOSE      Urinalysis Basic - ( 2018 20:11 )    Color: PLYEL / Appearance: CLEAR / S.011 / pH: 5.5  Gluc: NEGATIVE / Ketone: NEGATIVE  / Bili: NEGATIVE / Urobili: NORMAL mg/dL   Blood: MODERATE / Protein: 30 mg/dL / Nitrite: NEGATIVE   Leuk Esterase: TRACE / RBC: 10-25 / WBC 2-5   Sq Epi: x / Non Sq Epi: x / Bacteria: x      PHYSICAL EXAM:  ·	GENERAL: NAD, well-developed  ·	HEAD:  Atraumatic, Normocephalic  ·	EYES: EOMI, PERRL, conjunctiva and sclera clear  ·	NECK: Supple, No JVD  ·	CHEST/LUNG: Clear to auscultation bilaterally; No wheeze  ·	HEART: Regular rate and rhythm; No murmurs, rubs, or gallops  ·	ABDOMEN: Soft, Nontender, Nondistended; Bowel sounds present  ·	MUSCULOSKELETAL: No muscle wasting, Good ROM  ·	EXTREMITIES:  2+ Peripheral Pulses, No clubbing, cyanosis, or edema  ·	PSYCH: AAOx3  ·	NEUROLOGY: non-focal  ·	SKIN: No rashes or lesions 72 year old female, with past history inclusive of HTN, CAD - s/p multiple stents (remote), Emphysema, Alcohol abuse, Anxiety, R-femoral neck fracture (s/p CRPP - [facility], 10/2017), Repeated R-Hip USAMA (2018), Bladder repair, and other history as indicated below, presented to the floor, as direct admit, secondary to repeated fracture of the right USAMA (per reports).  Seen and evaluated at bedside; restless and appears to be experiencing significant pain of the RLE.  Patient is only able to provide limited information because of stress due to severe pain.  States that she tripped over a "Bullet" garbage lid in the kitchen and fell, striking the occiput; surmises that the floor might have been wet also.  No reports of LOC, dizziness, chest pain.  Patient was transferred to [facility] by EMS, and documentation shows daughter's authorization for subsequent transfer to [facility].      Vital signs upon floor presentation as follows: BP = 179/64, HR = 80, RR = 16, T = 37.2 C (98.9 F), O2 Sat = 95% on RA.  Admitted for surgical intervention of Right Total Hip Arthroplasty.    PAST MEDICAL & SURGICAL HISTORY:  ·	Pain of right hip joint  ·	Migraine  ·	Alcohol abuse  ·	Seizure (due to Benzodiazepine withdrawal)  ·	Coronary artery disease (s/p Stent)  ·	Anxiety  ·	Emphysema, unspecified  ·	Migraine  ·	Hyperlipidemia  ·	Hypertension  ·	HCAP  ·	Sepsis  ·	LAI  ·	Smoking  ·	Benzodiazepine abuse  ·	No significant past surgical history  ·	S/P bladder repair    FAMILY HISTORY:  ·	Family history of coronary artery disease in daughter (Child)    SOCIAL HISTORY  ·	  ·	Lives with  (but reports  currently admitted in [facility] because of fracture)  ·	Retired  ·	History of smoking cigarettes; 1ppd since age 16 years (~ 58 years).  Accepts offer for nicotine patch, but wants it to start after surgery  ·	History of alcohol abuse (per chart documentation)  ·	History of benzodiazepine abuse w/ associated seizure (per chart documentation)    ALLERGIES/INTOLERANCES:  ·	Cat hair extract - itching  ·	Pollen extract - itching    MEDICATIONS  (STANDING):  ·	dextrose 5% + sodium chloride 0.45% with potassium chloride 20 mEq/L 1000 milliLiter(s) (75 mL/Hr) IV Continuous <Continuous>  ·	morphine  - Injectable 2 milliGRAM(s) IV Push once    MEDICATIONS  (PRN):  ·	morphine  - Injectable 2 milliGRAM(s) IV Push every 4 hours PRN Severe Pain (7 - 10)  ·	oxyCODONE    IR 10 milliGRAM(s) Oral every 4 hours PRN Moderate Pain  ·	oxyCODONE    IR 5 milliGRAM(s) Oral every 4 hours PRN Mild Pain    REVIEW OF SYSTEMS:  ·	CONSTITUTIONAL: No weakness, fevers or chills  ·	EYES/ENT: No visual changes;  No dysphagia  ·	NECK: No pain or stiffness,  ·	RESPIRATORY: No cough, hemoptysis; No shortness of breath  ·	CARDIOVASCULAR: No chest pain or palpitations  ·	GASTROINTESTINAL: No abdominal or epigastric pain. No nausea, vomiting, or hematemesis; No diarrhea or constipation (last BM on day PTA). No melena or hematochezia.  ·	GENITOURINARY: No dysuria, frequency or hematuria  ·	MUSCULOSKELETAL: Severe pain of the R-hip and knee.  Swelling of RLE.  Decreased ROM of the RLE.  ·	NEUROLOGICAL: No numbness or weakness  ·	SKIN: No itching, burning, rashes, or lesions   ·	All other review of systems is negative unless indicated above.      LAB-WORK/STUDIES:                      8.6    10.18 )-----------( 310      ( 2018 20:00 )             26.7     2018 20:00    134    |  97     |  24     ----------------------------<  123    4.1     |  23     |  0.99     Ca    8.5        2018 20:00        PT/INR - ( 2018 20:00 )   PT: 13.4 SEC;   INR: 1.16          PTT - ( 2018 20:00 )  PTT:27.6 SEC  CAPILLARY BLOOD GLUCOSE      Urinalysis Basic - ( 2018 20:11 )    Color: PLYEL / Appearance: CLEAR / S.011 / pH: 5.5  Gluc: NEGATIVE / Ketone: NEGATIVE  / Bili: NEGATIVE / Urobili: NORMAL mg/dL   Blood: MODERATE / Protein: 30 mg/dL / Nitrite: NEGATIVE   Leuk Esterase: TRACE / RBC: 10-25 / WBC 2-5   Sq Epi: x / Non Sq Epi: x / Bacteria: x      PHYSICAL EXAM:  ·	GENERAL: NAD, well-developed  ·	HEAD:  Atraumatic, Normocephalic  ·	EYES: EOMI, PERRL, conjunctiva and sclera clear  ·	NECK: Supple, No JVD  ·	CHEST/LUNG: Clear to auscultation bilaterally; No wheeze  ·	HEART: Regular rate and rhythm; No murmurs, rubs, or gallops  ·	ABDOMEN: Soft, Nontender, Nondistended; Bowel sounds present  ·	MUSCULOSKELETAL: No muscle wasting, Good ROM  ·	EXTREMITIES:  2+ Peripheral Pulses, No clubbing, cyanosis, or edema  ·	PSYCH: AAOx3  ·	NEUROLOGY: non-focal  ·	SKIN: No rashes or lesions 72 year old female, with past history inclusive of HTN, CAD - s/p multiple stents (remote), Emphysema, Alcohol abuse, Anxiety, R-femoral neck fracture (s/p CRPP - [facility], 10/2017), Repeated R-Hip USAMA (2018), Bladder repair, and other history as indicated below, presented to the floor, as direct admit, secondary to repeated fracture of the right USAMA.  Seen and evaluated at bedside; restless and appears to be experiencing significant pain of the RLE.  Patient is only able to provide limited information because of stress due to severe pain.  States that she tripped over a "Bullet" garbage lid in the kitchen and fell, sustaining trauma to the right hip and occiput; surmises that the floor might have been wet also.  No reports of LOC, dizziness, chest pain.  Patient was transferred to [facility] by EMS, and documentation shows daughter's authorization for subsequent transfer to [facility].      Vital signs upon floor presentation as follows: BP = 179/64, HR = 80, RR = 16, T = 37.2 C (98.9 F), O2 Sat = 95% on RA.  Admitted for surgical intervention of Right Total Hip Arthroplasty.    PAST MEDICAL & SURGICAL HISTORY:  ·	Pain of right hip joint  ·	Migraine  ·	Alcohol abuse  ·	Seizure (due to Benzodiazepine withdrawal)  ·	Coronary artery disease (s/p Stent)  ·	Anxiety  ·	Major depressive disorder  ·	Emphysema, unspecified  ·	Migraine  ·	Hyperlipidemia  ·	Hypertension  ·	HCAP  ·	Sepsis  ·	LAI  ·	Smoking  ·	Radiculitis  ·	Insomnia  ·	Benzodiazepine abuse  ·	No significant past surgical history  ·	S/P bladder repair    FAMILY HISTORY:  ·	Family history of coronary artery disease in daughter (Child)    SOCIAL HISTORY  ·	  ·	Lives with  (but reports  currently admitted in [facility] because of fracture)  ·	Retired  ·	History of smoking cigarettes; 1ppd since age 16 years (~ 58 years).  Accepts offer for nicotine patch, but wants it to start after surgery  ·	History of alcohol abuse (per chart documentation)  ·	History of benzodiazepine abuse w/ associated seizure (per chart documentation)    ALLERGIES/INTOLERANCES:  ·	Cat hair extract - itching  ·	Pollen extract - itching    MEDICATIONS  (STANDING):  ·	dextrose 5% + sodium chloride 0.45% with potassium chloride 20 mEq/L 1000 milliLiter(s) (75 mL/Hr) IV Continuous <Continuous>  ·	morphine  - Injectable 2 milliGRAM(s) IV Push once    MEDICATIONS  (PRN):  ·	morphine  - Injectable 2 milliGRAM(s) IV Push every 4 hours PRN Severe Pain (7 - 10)  ·	oxyCODONE    IR 10 milliGRAM(s) Oral every 4 hours PRN Moderate Pain  ·	oxyCODONE    IR 5 milliGRAM(s) Oral every 4 hours PRN Mild Pain    REVIEW OF SYSTEMS:  ·	CONSTITUTIONAL: No weakness, fevers or chills  ·	EYES/ENT: No visual changes;  No dysphagia  ·	NECK: No pain or stiffness,  ·	RESPIRATORY: No cough, hemoptysis; No shortness of breath  ·	CARDIOVASCULAR: No chest pain or palpitations  ·	GASTROINTESTINAL: No abdominal or epigastric pain. No nausea, vomiting, or hematemesis; No diarrhea or constipation (last BM on day PTA). No melena or hematochezia.  ·	GENITOURINARY: No dysuria, frequency or hematuria  ·	MUSCULOSKELETAL: Severe pain of the R-hip and knee.  Swelling of RLE.  Decreased ROM of the RLE.  ·	NEUROLOGICAL: No numbness or weakness  ·	SKIN: No itching, burning, rashes, or lesions   ·	All other review of systems is negative unless indicated above.      LAB-WORK/STUDIES:                      8.6    10.18 )-----------( 310      ( 2018 20:00 )             26.7     2018 20:00    134    |  97     |  24     ----------------------------<  123    4.1     |  23     |  0.99     Ca    8.5        2018 20:00      PT/INR - ( 2018 20:00 )   PT: 13.4 SEC;   INR: 1.16          PTT - ( 2018 20:00 )  PTT:27.6 SEC  CAPILLARY BLOOD GLUCOSE      Urinalysis Basic - ( 2018 20:11 )    Color: PLYEL / Appearance: CLEAR / S.011 / pH: 5.5  Gluc: NEGATIVE / Ketone: NEGATIVE  / Bili: NEGATIVE / Urobili: NORMAL mg/dL   Blood: MODERATE / Protein: 30 mg/dL / Nitrite: NEGATIVE   Leuk Esterase: TRACE / RBC: 10-25 / WBC 2-5   Sq Epi: x / Non Sq Epi: x / Bacteria: x      PHYSICAL EXAM:  ·	GENERAL: NAD, well-developed, appears to be experiencing significantly painful discomfort of the RLE  ·	HEAD: Normocephalic.  No tenderness to mild/moderate palpation at site of head trauma (sustained during fall)  ·	EYES: EOMI, conjunctiva and sclera clear  ·	NECK: Supple, No JVD  ·	CHEST/LUNG: Coarse breath sounds bilaterally; No wheeze  ·	HEART: Regular rate and rhythm; No murmurs, rubs, or gallops  ·	ABDOMEN: Soft, Nontender, Nondistended; Bowel sounds present  ·	MUSCULOSKELETAL: No muscle wasting, decreased ROM or the right LE  ·	EXTREMITIES: RLE with diffuse swelling - including the toes.  Pins/Rods in place to B/L knee joint area.  Tenderness of upper LE.  Unable to appreciate LE peripheral pulses via palpation.  ·	PSYCH: Alert, appropriately verbally communicative, in some distress due to pain of the RLE  ·	NEUROLOGY: grossly intact to touch, pain  ·	SKIN: No rashes or lesions    ===========================================  ECG = Sinus with short DE at 85 BPM (DE = 110 ms), QTc = 478    NUCLEAR STRESS TEST (2018)  IMPRESSIONS: Normal Study  * The left ventricle was normal in size.  * Tracer uptake was homogeneous throughout the left  ventricle.  * Normal study; no evidence for myocardial infarction or  ischemia.  * Gated wall motion analysis was performed, and shows  normal wall motion.    PRINTED REPORTS FROM [facility]  CT HEAD WO CONTRAST (2018): No evidence of acute intracranial abnormality.    CT CERVICAL SPINE WO CONTRAST (2018): No CT evidence of acute fracture or subluxation within the cervical spine.  Multilevel degenerative disc disease and facet arthropathy seen on the prior study.  Minimal anterior subluxation of T2 on T3 on unknown chronicity...    CT CHEST WO CONTRAST (2018): Acute right middle lobe pneumonia.  Dilated thick walled esophagus, consider elective follow-up investigation.  Progressive collapse of T2 vertebral body...    X-RAY PELVIS ANTEROPOSTERIOR (2018): Fracture/displacement at the right proximal femur 72 year old female, with past history inclusive of HTN, CAD - s/p multiple stents (remote), Emphysema, Alcohol abuse, Anxiety, R-femoral neck fracture (s/p CRPP - [facility], 10/2017), Repeated R-Hip USAMA (2018), Bladder repair, and other history as indicated below, presented to the floor, as direct admit, secondary to repeated fracture of the right USAMA.  Seen and evaluated at bedside; restless and appears to be experiencing significant pain of the RLE.  Patient is only able to provide limited information because of stress due to severe pain.  States that she tripped over a "Bullet" garbage lid in the kitchen and fell, sustaining trauma to the right hip and occiput; surmises that the floor might have been wet also.  No reports of LOC, dizziness, chest pain.  Patient was transferred to [facility] by EMS, and documentation shows [facility]'s [facility]    Vital signs upon floor presentation as follows: BP = 179/64, HR = 80, RR = 16, T = 37.2 C (98.9 F), O2 Sat = 95% on RA.  Admitted for surgical intervention of Right Total Hip Arthroplasty.    Daughter = Brittni - [phone]    PAST MEDICAL & SURGICAL HISTORY:  ·	Pain of right hip joint  ·	Migraine  ·	Alcohol abuse  ·	Seizure (due to Benzodiazepine withdrawal)  ·	Coronary artery disease (s/p Stent)  ·	Anxiety  ·	Major depressive disorder  ·	Emphysema, unspecified  ·	Migraine  ·	Hyperlipidemia  ·	Hypertension  ·	HCAP  ·	Sepsis  ·	LAI  ·	Smoking  ·	Radiculitis  ·	Insomnia  ·	Benzodiazepine abuse  ·	No significant past surgical history  ·	S/P bladder repair    FAMILY HISTORY:  ·	Family history of coronary artery disease in daughter (Child)    SOCIAL HISTORY  ·	  ·	Lives with  (but reports  currently admitted in [facility] because of fracture)  ·	Retired  ·	History of smoking cigarettes; 1ppd since age 16 years (~ 58 years).  Accepts offer for nicotine patch, but wants it to start after surgery  ·	History of alcohol abuse (per chart documentation)  ·	History of benzodiazepine abuse w/ associated seizure (per chart documentation)    ALLERGIES/INTOLERANCES:  ·	Cat hair extract - itching  ·	Pollen extract - itching    MEDICATIONS  (STANDING):  ·	dextrose 5% + sodium chloride 0.45% with potassium chloride 20 mEq/L 1000 milliLiter(s) (75 mL/Hr) IV Continuous <Continuous>  ·	morphine  - Injectable 2 milliGRAM(s) IV Push once    MEDICATIONS  (PRN):  ·	morphine  - Injectable 2 milliGRAM(s) IV Push every 4 hours PRN Severe Pain (7 - 10)  ·	oxyCODONE    IR 10 milliGRAM(s) Oral every 4 hours PRN Moderate Pain  ·	oxyCODONE    IR 5 milliGRAM(s) Oral every 4 hours PRN Mild Pain    REVIEW OF SYSTEMS:  ·	CONSTITUTIONAL: No weakness, fevers or chills  ·	EYES/ENT: No visual changes;  No dysphagia  ·	NECK: No pain or stiffness,  ·	RESPIRATORY: No cough, hemoptysis; No shortness of breath  ·	CARDIOVASCULAR: No chest pain or palpitations  ·	GASTROINTESTINAL: No abdominal or epigastric pain. No nausea, vomiting, or hematemesis; No diarrhea or constipation (last BM on day PTA). No melena or hematochezia.  ·	GENITOURINARY: No dysuria, frequency or hematuria  ·	MUSCULOSKELETAL: Severe pain of the R-hip and knee.  Swelling of RLE.  Decreased ROM of the RLE.  ·	NEUROLOGICAL: No numbness or weakness  ·	SKIN: No itching, burning, rashes, or lesions   ·	All other review of systems is negative unless indicated above.      LAB-WORK/STUDIES:                      8.6    10.18 )-----------( 310      ( 2018 20:00 )             26.7     2018 20:00    134    |  97     |  24     ----------------------------<  123    4.1     |  23     |  0.99     Ca    8.5        2018 20:00      PT/INR - ( 2018 20:00 )   PT: 13.4 SEC;   INR: 1.16          PTT - ( 2018 20:00 )  PTT:27.6 SEC  CAPILLARY BLOOD GLUCOSE      Urinalysis Basic - ( 2018 20:11 )    Color: PLYEL / Appearance: CLEAR / S.011 / pH: 5.5  Gluc: NEGATIVE / Ketone: NEGATIVE  / Bili: NEGATIVE / Urobili: NORMAL mg/dL   Blood: MODERATE / Protein: 30 mg/dL / Nitrite: NEGATIVE   Leuk Esterase: TRACE / RBC: 10-25 / WBC 2-5   Sq Epi: x / Non Sq Epi: x / Bacteria: x      PHYSICAL EXAM:  ·	GENERAL: NAD, well-developed, appears to be experiencing significantly painful discomfort of the RLE  ·	HEAD: Normocephalic.  No tenderness to mild/moderate palpation at site of head trauma (sustained during fall)  ·	EYES: EOMI, conjunctiva and sclera clear  ·	NECK: Supple, No JVD  ·	CHEST/LUNG: Coarse breath sounds bilaterally; No wheeze  ·	HEART: Regular rate and rhythm; No murmurs, rubs, or gallops  ·	ABDOMEN: Soft, Nontender, Nondistended; Bowel sounds present  ·	MUSCULOSKELETAL: No muscle wasting, decreased ROM or the right LE  ·	EXTREMITIES: RLE with diffuse swelling - including the toes.  Pins/Rods in place to B/L knee joint area.  Tenderness of upper LE.  Unable to appreciate LE peripheral pulses via palpation.  ·	PSYCH: Alert, appropriately verbally communicative, in some distress due to pain of the RLE  ·	NEUROLOGY: grossly intact to touch, pain  ·	SKIN: No rashes or lesions    ===========================================  ECG = Sinus with short MA at 85 BPM (MA = 110 ms), QTc = 478    NUCLEAR STRESS TEST (2018)  IMPRESSIONS: Normal Study  * The left ventricle was normal in size.  * Tracer uptake was homogeneous throughout the left  ventricle.  * Normal study; no evidence for myocardial infarction or  ischemia.  * Gated wall motion analysis was performed, and shows  normal wall motion.    PRINTED REPORTS FROM [facility]  CT HEAD WO CONTRAST (2018): No evidence of acute intracranial abnormality.    CT CERVICAL SPINE WO CONTRAST (2018): No CT evidence of acute fracture or subluxation within the cervical spine.  Multilevel degenerative disc disease and facet arthropathy seen on the prior study.  Minimal anterior subluxation of T2 on T3 on unknown chronicity...    CT CHEST WO CONTRAST (2018): Acute right middle lobe pneumonia.  Dilated thick walled esophagus, consider elective follow-up investigation.  Progressive collapse of T2 vertebral body...    X-RAY PELVIS ANTEROPOSTERIOR (2018): Fracture/displacement at the right proximal femur 72 year old female, with past history inclusive of HTN, CAD - s/p multiple stents (remote), Emphysema, Alcohol abuse, Anxiety, R-femoral neck fracture (s/p CRPP - [facility], 10/2017), Repeated R-Hip USAMA (2018), Bladder repair, and other history as indicated below, presented to the floor, as direct admit, secondary to repeated fracture of the right USAMA.  Seen and evaluated at bedside; restless and appears to be experiencing significant pain of the RLE.  Patient is only able to provide limited information because of stress due to severe pain.  States that she tripped over a "Bullet" garbage lid in the kitchen and fell, sustaining trauma to the right hip and occiput; surmises that the floor might have been wet also.  No reports of LOC, dizziness, chest pain.  Patient was transferred to [facility] by EMS, and documentation shows daughter's authorization for subsequent transfer to [facility].    Vital signs upon floor presentation as follows: BP = 179/64, HR = 80, RR = 16, T = 37.2 C (98.9 F), O2 Sat = 95% on RA.  Admitted for surgical intervention of Right Total Hip Arthroplasty.    Daughter = Brittni - [phone]    PAST MEDICAL & SURGICAL HISTORY:  ·	Pain of right hip joint  ·	Migraine  ·	Alcohol abuse  ·	Seizure (due to Benzodiazepine withdrawal)  ·	Coronary artery disease (s/p Stent)  ·	Anxiety  ·	Major depressive disorder  ·	Emphysema, unspecified  ·	Migraine  ·	Hyperlipidemia  ·	Hypertension  ·	HCAP  ·	Sepsis  ·	LAI  ·	Smoking  ·	Radiculitis  ·	Insomnia  ·	Benzodiazepine abuse  ·	No significant past surgical history  ·	S/P bladder repair    FAMILY HISTORY:  ·	Family history of coronary artery disease in daughter (Child)    SOCIAL HISTORY  ·	  ·	Lives with  (but reports  currently admitted in [facility] because of fracture)  ·	Retired  ·	History of smoking cigarettes; 1ppd since age 16 years (~ 58 years).  Accepts offer for nicotine patch, but wants it to start after surgery  ·	History of alcohol abuse (per chart documentation)  ·	History of benzodiazepine abuse w/ associated seizure (per chart documentation)    ALLERGIES/INTOLERANCES:  ·	Cat hair extract - itching  ·	Pollen extract - itching    MEDICATIONS  (STANDING):  ·	dextrose 5% + sodium chloride 0.45% with potassium chloride 20 mEq/L 1000 milliLiter(s) (75 mL/Hr) IV Continuous <Continuous>  ·	morphine  - Injectable 2 milliGRAM(s) IV Push once    MEDICATIONS  (PRN):  ·	morphine  - Injectable 2 milliGRAM(s) IV Push every 4 hours PRN Severe Pain (7 - 10)  ·	oxyCODONE    IR 10 milliGRAM(s) Oral every 4 hours PRN Moderate Pain  ·	oxyCODONE    IR 5 milliGRAM(s) Oral every 4 hours PRN Mild Pain    REVIEW OF SYSTEMS:  ·	CONSTITUTIONAL: No weakness, fevers or chills  ·	EYES/ENT: No visual changes;  No dysphagia  ·	NECK: No pain or stiffness,  ·	RESPIRATORY: No cough  or hemoptysis; No shortness of breath  ·	CARDIOVASCULAR: No chest pain or palpitations  ·	GASTROINTESTINAL: No abdominal or epigastric pain. No nausea, vomiting, or hematemesis; No diarrhea or constipation (last BM on day PTA). No melena or hematochezia.  ·	GENITOURINARY: No dysuria, frequency or hematuria  ·	MUSCULOSKELETAL: Severe pain of the R-hip and knee.  Swelling of RLE.  Decreased ROM of the RLE.  ·	NEUROLOGICAL: No numbness or weakness  ·	SKIN: No itching, burning, rashes, or lesions   ·	All other review of systems is negative unless indicated above.      LAB-WORK/STUDIES:                      8.6    10.18 )-----------( 310      ( 2018 20:00 )             26.7     2018 20:00    134    |  97     |  24     ----------------------------<  123    4.1     |  23     |  0.99     Ca    8.5        2018 20:00      PT/INR - ( 2018 20:00 )   PT: 13.4 SEC;   INR: 1.16          PTT - ( 2018 20:00 )  PTT:27.6 SEC  CAPILLARY BLOOD GLUCOSE      Urinalysis Basic - ( 2018 20:11 )    Color: PLYEL / Appearance: CLEAR / S.011 / pH: 5.5  Gluc: NEGATIVE / Ketone: NEGATIVE  / Bili: NEGATIVE / Urobili: NORMAL mg/dL   Blood: MODERATE / Protein: 30 mg/dL / Nitrite: NEGATIVE   Leuk Esterase: TRACE / RBC: 10-25 / WBC 2-5   Sq Epi: x / Non Sq Epi: x / Bacteria: x      PHYSICAL EXAM:  ·	GENERAL: NAD, well-developed, appears to be experiencing significantly painful discomfort of the RLE  ·	HEAD: Normocephalic.  No tenderness to mild/moderate palpation at site of head trauma (sustained during fall)  ·	EYES: EOMI, conjunctiva and sclera clear  ·	NECK: Supple, No JVD  ·	CHEST/LUNG: Coarse breath sounds bilaterally; No wheeze  ·	HEART: Regular rate and rhythm; No murmurs, rubs, or gallops  ·	ABDOMEN: Soft, Nontender, Nondistended; Bowel sounds present  ·	MUSCULOSKELETAL: No muscle wasting, decreased ROM or the right LE  ·	EXTREMITIES: RLE with diffuse swelling - including the toes.  Pins/Rods in place to B/L knee joint area.  Tenderness of upper LE.  Unable to appreciate LE peripheral pulses via palpation.  ·	PSYCH: Alert, appropriately verbally communicative, in some distress due to pain of the RLE  ·	NEUROLOGY: grossly intact to touch, pain  ·	SKIN: No rashes or lesions    ===========================================  ECG = Sinus with short NM at 85 BPM (NM = 110 ms), QTc = 478    NUCLEAR STRESS TEST (2018)  IMPRESSIONS: Normal Study  * The left ventricle was normal in size.  * Tracer uptake was homogeneous throughout the left  ventricle.  * Normal study; no evidence for myocardial infarction or  ischemia.  * Gated wall motion analysis was performed, and shows  normal wall motion.    PRINTED REPORTS FROM [facility]  CT HEAD WO CONTRAST (2018): No evidence of acute intracranial abnormality.    CT CERVICAL SPINE WO CONTRAST (2018): No CT evidence of acute fracture or subluxation within the cervical spine.  Multilevel degenerative disc disease and facet arthropathy seen on the prior study.  Minimal anterior subluxation of T2 on T3 on unknown chronicity...    CT CHEST WO CONTRAST (2018): Acute right middle lobe pneumonia.  Dilated thick walled esophagus, consider elective follow-up investigation.  Progressive collapse of T2 vertebral body...    X-RAY PELVIS ANTEROPOSTERIOR (2018): Fracture/displacement at the right proximal femur

## 2018-06-28 ENCOUNTER — INPATIENT (INPATIENT)
Facility: HOSPITAL | Age: 73
LOS: 4 days | Discharge: NURSING, REHAB OR LONG-TERM CARE | DRG: 467 | End: 2018-07-03
Attending: ORTHOPAEDIC SURGERY | Admitting: ORTHOPAEDIC SURGERY
Payer: MEDICARE

## 2018-06-28 VITALS
SYSTOLIC BLOOD PRESSURE: 181 MMHG | OXYGEN SATURATION: 95 % | RESPIRATION RATE: 18 BRPM | HEART RATE: 66 BPM | TEMPERATURE: 98 F | DIASTOLIC BLOOD PRESSURE: 89 MMHG

## 2018-06-28 VITALS
HEART RATE: 74 BPM | SYSTOLIC BLOOD PRESSURE: 125 MMHG | TEMPERATURE: 98 F | OXYGEN SATURATION: 98 % | DIASTOLIC BLOOD PRESSURE: 92 MMHG | RESPIRATION RATE: 20 BRPM

## 2018-06-28 DIAGNOSIS — I25.10 ATHEROSCLEROTIC HEART DISEASE OF NATIVE CORONARY ARTERY WITHOUT ANGINA PECTORIS: ICD-10-CM

## 2018-06-28 DIAGNOSIS — Z96.641 PRESENCE OF RIGHT ARTIFICIAL HIP JOINT: Chronic | ICD-10-CM

## 2018-06-28 DIAGNOSIS — Z29.9 ENCOUNTER FOR PROPHYLACTIC MEASURES, UNSPECIFIED: ICD-10-CM

## 2018-06-28 DIAGNOSIS — I10 ESSENTIAL (PRIMARY) HYPERTENSION: ICD-10-CM

## 2018-06-28 DIAGNOSIS — F10.10 ALCOHOL ABUSE, UNCOMPLICATED: ICD-10-CM

## 2018-06-28 DIAGNOSIS — Z79.899 OTHER LONG TERM (CURRENT) DRUG THERAPY: ICD-10-CM

## 2018-06-28 DIAGNOSIS — D64.9 ANEMIA, UNSPECIFIED: ICD-10-CM

## 2018-06-28 DIAGNOSIS — S62.009: ICD-10-CM

## 2018-06-28 DIAGNOSIS — G43.909 MIGRAINE, UNSPECIFIED, NOT INTRACTABLE, WITHOUT STATUS MIGRAINOSUS: ICD-10-CM

## 2018-06-28 DIAGNOSIS — F17.210 NICOTINE DEPENDENCE, CIGARETTES, UNCOMPLICATED: ICD-10-CM

## 2018-06-28 DIAGNOSIS — R52 PAIN, UNSPECIFIED: ICD-10-CM

## 2018-06-28 DIAGNOSIS — S62.009A UNSPECIFIED FRACTURE OF NAVICULAR [SCAPHOID] BONE OF UNSPECIFIED WRIST, INITIAL ENCOUNTER FOR CLOSED FRACTURE: ICD-10-CM

## 2018-06-28 DIAGNOSIS — J44.9 CHRONIC OBSTRUCTIVE PULMONARY DISEASE, UNSPECIFIED: ICD-10-CM

## 2018-06-28 DIAGNOSIS — Z98.89 OTHER SPECIFIED POSTPROCEDURAL STATES: Chronic | ICD-10-CM

## 2018-06-28 DIAGNOSIS — K22.8 OTHER SPECIFIED DISEASES OF ESOPHAGUS: ICD-10-CM

## 2018-06-28 DIAGNOSIS — R63.8 OTHER SYMPTOMS AND SIGNS CONCERNING FOOD AND FLUID INTAKE: ICD-10-CM

## 2018-06-28 DIAGNOSIS — M25.551 PAIN IN RIGHT HIP: ICD-10-CM

## 2018-06-28 DIAGNOSIS — S72.001A FRACTURE OF UNSPECIFIED PART OF NECK OF RIGHT FEMUR, INITIAL ENCOUNTER FOR CLOSED FRACTURE: ICD-10-CM

## 2018-06-28 DIAGNOSIS — J18.9 PNEUMONIA, UNSPECIFIED ORGANISM: ICD-10-CM

## 2018-06-28 LAB
24R-OH-CALCIDIOL SERPL-MCNC: 14.4 NG/ML — LOW (ref 30–80)
APTT BLD: 27 SEC — LOW (ref 27.5–37.4)
BLD GP AB SCN SERPL QL: NEGATIVE — SIGNIFICANT CHANGE UP
BLD GP AB SCN SERPL QL: NEGATIVE — SIGNIFICANT CHANGE UP
BUN SERPL-MCNC: 19 MG/DL — SIGNIFICANT CHANGE UP (ref 7–23)
CALCIUM SERPL-MCNC: 7.9 MG/DL — LOW (ref 8.4–10.5)
CHLORIDE SERPL-SCNC: 99 MMOL/L — SIGNIFICANT CHANGE UP (ref 98–107)
CO2 SERPL-SCNC: 24 MMOL/L — SIGNIFICANT CHANGE UP (ref 22–31)
CREAT SERPL-MCNC: 0.84 MG/DL — SIGNIFICANT CHANGE UP (ref 0.5–1.3)
FERRITIN SERPL-MCNC: 130.9 NG/ML — SIGNIFICANT CHANGE UP (ref 15–150)
GLUCOSE SERPL-MCNC: 108 MG/DL — HIGH (ref 70–99)
HCT VFR BLD CALC: 21.9 % — LOW (ref 34.5–45)
HGB BLD-MCNC: 7.1 G/DL — LOW (ref 11.5–15.5)
INR BLD: 1.21 — HIGH (ref 0.88–1.17)
IRON SATN MFR SERPL: 189 UG/DL — SIGNIFICANT CHANGE UP (ref 140–530)
IRON SATN MFR SERPL: 28 UG/DL — LOW (ref 30–160)
MCHC RBC-ENTMCNC: 30 PG — SIGNIFICANT CHANGE UP (ref 27–34)
MCHC RBC-ENTMCNC: 32.4 % — SIGNIFICANT CHANGE UP (ref 32–36)
MCV RBC AUTO: 92.4 FL — SIGNIFICANT CHANGE UP (ref 80–100)
NRBC # FLD: 0 — SIGNIFICANT CHANGE UP
PLATELET # BLD AUTO: 290 K/UL — SIGNIFICANT CHANGE UP (ref 150–400)
PMV BLD: 9.3 FL — SIGNIFICANT CHANGE UP (ref 7–13)
POTASSIUM SERPL-MCNC: 3.6 MMOL/L — SIGNIFICANT CHANGE UP (ref 3.5–5.3)
POTASSIUM SERPL-SCNC: 3.6 MMOL/L — SIGNIFICANT CHANGE UP (ref 3.5–5.3)
PROTHROM AB SERPL-ACNC: 13.5 SEC — HIGH (ref 9.8–13.1)
RBC # BLD: 2.37 M/UL — LOW (ref 3.8–5.2)
RBC # FLD: 14.2 % — SIGNIFICANT CHANGE UP (ref 10.3–14.5)
RETICS #: 33 K/UL — SIGNIFICANT CHANGE UP (ref 25–125)
RETICS/RBC NFR: 1.4 % — SIGNIFICANT CHANGE UP (ref 0.5–2.5)
RH IG SCN BLD-IMP: POSITIVE — SIGNIFICANT CHANGE UP
RH IG SCN BLD-IMP: POSITIVE — SIGNIFICANT CHANGE UP
SODIUM SERPL-SCNC: 135 MMOL/L — SIGNIFICANT CHANGE UP (ref 135–145)
TSH SERPL-MCNC: 4.8 UIU/ML — HIGH (ref 0.27–4.2)
UIBC SERPL-MCNC: 160.8 UG/DL — SIGNIFICANT CHANGE UP (ref 110–370)
WBC # BLD: 8.95 K/UL — SIGNIFICANT CHANGE UP (ref 3.8–10.5)
WBC # FLD AUTO: 8.95 K/UL — SIGNIFICANT CHANGE UP (ref 3.8–10.5)

## 2018-06-28 PROCEDURE — 93010 ELECTROCARDIOGRAM REPORT: CPT

## 2018-06-28 PROCEDURE — 71045 X-RAY EXAM CHEST 1 VIEW: CPT | Mod: 26

## 2018-06-28 RX ORDER — HYDROMORPHONE HYDROCHLORIDE 2 MG/ML
1 INJECTION INTRAMUSCULAR; INTRAVENOUS; SUBCUTANEOUS ONCE
Qty: 0 | Refills: 0 | Status: DISCONTINUED | OUTPATIENT
Start: 2018-06-28 | End: 2018-06-28

## 2018-06-28 RX ORDER — HYDROMORPHONE HYDROCHLORIDE 2 MG/ML
1 INJECTION INTRAMUSCULAR; INTRAVENOUS; SUBCUTANEOUS
Qty: 0 | Refills: 0 | COMMUNITY
Start: 2018-06-28

## 2018-06-28 RX ORDER — IPRATROPIUM/ALBUTEROL SULFATE 18-103MCG
3 AEROSOL WITH ADAPTER (GRAM) INHALATION EVERY 6 HOURS
Qty: 0 | Refills: 0 | Status: DISCONTINUED | OUTPATIENT
Start: 2018-06-28 | End: 2018-06-29

## 2018-06-28 RX ORDER — TIZANIDINE 4 MG/1
2 TABLET ORAL EVERY 6 HOURS
Qty: 0 | Refills: 0 | Status: DISCONTINUED | OUTPATIENT
Start: 2018-06-28 | End: 2018-06-28

## 2018-06-28 RX ORDER — HYDROMORPHONE HYDROCHLORIDE 2 MG/ML
2 INJECTION INTRAMUSCULAR; INTRAVENOUS; SUBCUTANEOUS
Qty: 0 | Refills: 0 | Status: DISCONTINUED | OUTPATIENT
Start: 2018-06-28 | End: 2018-06-29

## 2018-06-28 RX ORDER — NICOTINE POLACRILEX 2 MG
4 GUM BUCCAL
Qty: 0 | Refills: 0 | Status: DISCONTINUED | OUTPATIENT
Start: 2018-06-28 | End: 2018-06-28

## 2018-06-28 RX ORDER — CARVEDILOL PHOSPHATE 80 MG/1
1 CAPSULE, EXTENDED RELEASE ORAL
Qty: 0 | Refills: 0 | COMMUNITY
Start: 2018-06-28

## 2018-06-28 RX ORDER — HYDROMORPHONE HYDROCHLORIDE 2 MG/ML
1 INJECTION INTRAMUSCULAR; INTRAVENOUS; SUBCUTANEOUS EVERY 4 HOURS
Qty: 0 | Refills: 0 | Status: DISCONTINUED | OUTPATIENT
Start: 2018-06-28 | End: 2018-06-28

## 2018-06-28 RX ORDER — HYDROMORPHONE HYDROCHLORIDE 2 MG/ML
4 INJECTION INTRAMUSCULAR; INTRAVENOUS; SUBCUTANEOUS EVERY 4 HOURS
Qty: 0 | Refills: 0 | Status: DISCONTINUED | OUTPATIENT
Start: 2018-06-28 | End: 2018-06-29

## 2018-06-28 RX ORDER — HYDROMORPHONE HYDROCHLORIDE 2 MG/ML
1 INJECTION INTRAMUSCULAR; INTRAVENOUS; SUBCUTANEOUS EVERY 4 HOURS
Qty: 0 | Refills: 0 | Status: DISCONTINUED | OUTPATIENT
Start: 2018-06-28 | End: 2018-06-30

## 2018-06-28 RX ORDER — TRAMADOL HYDROCHLORIDE 50 MG/1
50 TABLET ORAL EVERY 8 HOURS
Qty: 0 | Refills: 0 | Status: DISCONTINUED | OUTPATIENT
Start: 2018-06-28 | End: 2018-06-30

## 2018-06-28 RX ORDER — GABAPENTIN 400 MG/1
300 CAPSULE ORAL THREE TIMES A DAY
Qty: 0 | Refills: 0 | Status: DISCONTINUED | OUTPATIENT
Start: 2018-06-28 | End: 2018-06-28

## 2018-06-28 RX ORDER — HEPARIN SODIUM 5000 [USP'U]/ML
5 INJECTION INTRAVENOUS; SUBCUTANEOUS
Qty: 0 | Refills: 0 | COMMUNITY
Start: 2018-06-28

## 2018-06-28 RX ORDER — ONDANSETRON 8 MG/1
4 TABLET, FILM COATED ORAL EVERY 6 HOURS
Qty: 0 | Refills: 0 | Status: DISCONTINUED | OUTPATIENT
Start: 2018-06-28 | End: 2018-06-30

## 2018-06-28 RX ORDER — HYDROMORPHONE HYDROCHLORIDE 2 MG/ML
4 INJECTION INTRAMUSCULAR; INTRAVENOUS; SUBCUTANEOUS
Qty: 0 | Refills: 0 | Status: DISCONTINUED | OUTPATIENT
Start: 2018-06-28 | End: 2018-06-28

## 2018-06-28 RX ORDER — LABETALOL HCL 100 MG
200 TABLET ORAL THREE TIMES A DAY
Qty: 0 | Refills: 0 | Status: DISCONTINUED | OUTPATIENT
Start: 2018-06-28 | End: 2018-06-30

## 2018-06-28 RX ORDER — NIFEDIPINE 30 MG
90 TABLET, EXTENDED RELEASE 24 HR ORAL DAILY
Qty: 0 | Refills: 0 | Status: DISCONTINUED | OUTPATIENT
Start: 2018-06-28 | End: 2018-06-30

## 2018-06-28 RX ORDER — SENNA PLUS 8.6 MG/1
2 TABLET ORAL AT BEDTIME
Qty: 0 | Refills: 0 | Status: DISCONTINUED | OUTPATIENT
Start: 2018-06-28 | End: 2018-06-30

## 2018-06-28 RX ORDER — DOCUSATE SODIUM 100 MG
1 CAPSULE ORAL
Qty: 0 | Refills: 0 | COMMUNITY
Start: 2018-06-28

## 2018-06-28 RX ORDER — HYDROMORPHONE HYDROCHLORIDE 2 MG/ML
2 INJECTION INTRAMUSCULAR; INTRAVENOUS; SUBCUTANEOUS
Qty: 0 | Refills: 0 | Status: DISCONTINUED | OUTPATIENT
Start: 2018-06-28 | End: 2018-06-28

## 2018-06-28 RX ORDER — CARVEDILOL PHOSPHATE 80 MG/1
12.5 CAPSULE, EXTENDED RELEASE ORAL EVERY 12 HOURS
Qty: 0 | Refills: 0 | Status: DISCONTINUED | OUTPATIENT
Start: 2018-06-28 | End: 2018-06-28

## 2018-06-28 RX ORDER — QUETIAPINE FUMARATE 200 MG/1
50 TABLET, FILM COATED ORAL EVERY 6 HOURS
Qty: 0 | Refills: 0 | Status: DISCONTINUED | OUTPATIENT
Start: 2018-06-28 | End: 2018-06-30

## 2018-06-28 RX ORDER — HYDRALAZINE HCL 50 MG
50 TABLET ORAL EVERY 8 HOURS
Qty: 0 | Refills: 0 | Status: DISCONTINUED | OUTPATIENT
Start: 2018-06-28 | End: 2018-06-30

## 2018-06-28 RX ORDER — THIAMINE MONONITRATE (VIT B1) 100 MG
100 TABLET ORAL DAILY
Qty: 0 | Refills: 0 | Status: DISCONTINUED | OUTPATIENT
Start: 2018-06-28 | End: 2018-06-30

## 2018-06-28 RX ORDER — DOCUSATE SODIUM 100 MG
100 CAPSULE ORAL
Qty: 0 | Refills: 0 | Status: DISCONTINUED | OUTPATIENT
Start: 2018-06-28 | End: 2018-06-28

## 2018-06-28 RX ORDER — HYDROMORPHONE HYDROCHLORIDE 2 MG/ML
0.5 INJECTION INTRAMUSCULAR; INTRAVENOUS; SUBCUTANEOUS EVERY 4 HOURS
Qty: 0 | Refills: 0 | Status: DISCONTINUED | OUTPATIENT
Start: 2018-06-28 | End: 2018-06-28

## 2018-06-28 RX ORDER — HEPARIN SODIUM 5000 [USP'U]/ML
5000 INJECTION INTRAVENOUS; SUBCUTANEOUS EVERY 8 HOURS
Qty: 0 | Refills: 0 | Status: DISCONTINUED | OUTPATIENT
Start: 2018-06-28 | End: 2018-06-28

## 2018-06-28 RX ORDER — SENNA PLUS 8.6 MG/1
2 TABLET ORAL AT BEDTIME
Qty: 0 | Refills: 0 | Status: DISCONTINUED | OUTPATIENT
Start: 2018-06-28 | End: 2018-06-28

## 2018-06-28 RX ORDER — NICOTINE POLACRILEX 2 MG
1 GUM BUCCAL DAILY
Qty: 0 | Refills: 0 | Status: DISCONTINUED | OUTPATIENT
Start: 2018-06-28 | End: 2018-06-30

## 2018-06-28 RX ORDER — ACETAMINOPHEN 500 MG
650 TABLET ORAL EVERY 6 HOURS
Qty: 0 | Refills: 0 | Status: DISCONTINUED | OUTPATIENT
Start: 2018-06-28 | End: 2018-06-30

## 2018-06-28 RX ORDER — PANTOPRAZOLE SODIUM 20 MG/1
40 TABLET, DELAYED RELEASE ORAL
Qty: 0 | Refills: 0 | Status: DISCONTINUED | OUTPATIENT
Start: 2018-06-28 | End: 2018-06-30

## 2018-06-28 RX ORDER — TRAMADOL HYDROCHLORIDE 50 MG/1
1 TABLET ORAL
Qty: 0 | Refills: 0 | COMMUNITY
Start: 2018-06-28

## 2018-06-28 RX ORDER — SENNA PLUS 8.6 MG/1
2 TABLET ORAL
Qty: 0 | Refills: 0 | COMMUNITY
Start: 2018-06-28

## 2018-06-28 RX ORDER — QUETIAPINE FUMARATE 200 MG/1
150 TABLET, FILM COATED ORAL AT BEDTIME
Qty: 0 | Refills: 0 | Status: DISCONTINUED | OUTPATIENT
Start: 2018-06-28 | End: 2018-06-30

## 2018-06-28 RX ORDER — CYCLOBENZAPRINE HYDROCHLORIDE 10 MG/1
5 TABLET, FILM COATED ORAL THREE TIMES A DAY
Qty: 0 | Refills: 0 | Status: DISCONTINUED | OUTPATIENT
Start: 2018-06-28 | End: 2018-06-30

## 2018-06-28 RX ORDER — IPRATROPIUM/ALBUTEROL SULFATE 18-103MCG
3 AEROSOL WITH ADAPTER (GRAM) INHALATION
Qty: 0 | Refills: 0 | COMMUNITY

## 2018-06-28 RX ORDER — TRAMADOL HYDROCHLORIDE 50 MG/1
50 TABLET ORAL EVERY 8 HOURS
Qty: 0 | Refills: 0 | Status: DISCONTINUED | OUTPATIENT
Start: 2018-06-28 | End: 2018-06-28

## 2018-06-28 RX ORDER — TIZANIDINE 4 MG/1
1 TABLET ORAL
Qty: 0 | Refills: 0 | COMMUNITY
Start: 2018-06-28

## 2018-06-28 RX ORDER — TIZANIDINE 4 MG/1
2 TABLET ORAL EVERY 6 HOURS
Qty: 0 | Refills: 0 | Status: DISCONTINUED | OUTPATIENT
Start: 2018-06-29 | End: 2018-06-28

## 2018-06-28 RX ORDER — DOCUSATE SODIUM 100 MG
100 CAPSULE ORAL THREE TIMES A DAY
Qty: 0 | Refills: 0 | Status: DISCONTINUED | OUTPATIENT
Start: 2018-06-28 | End: 2018-06-30

## 2018-06-28 RX ORDER — HEPARIN SODIUM 5000 [USP'U]/ML
5000 INJECTION INTRAVENOUS; SUBCUTANEOUS EVERY 8 HOURS
Qty: 0 | Refills: 0 | Status: DISCONTINUED | OUTPATIENT
Start: 2018-06-28 | End: 2018-06-29

## 2018-06-28 RX ORDER — NICOTINE POLACRILEX 2 MG
2 GUM BUCCAL
Qty: 0 | Refills: 0 | Status: DISCONTINUED | OUTPATIENT
Start: 2018-06-28 | End: 2018-06-28

## 2018-06-28 RX ORDER — GABAPENTIN 400 MG/1
1 CAPSULE ORAL
Qty: 0 | Refills: 0 | COMMUNITY
Start: 2018-06-28

## 2018-06-28 RX ADMIN — HEPARIN SODIUM 5000 UNIT(S): 5000 INJECTION INTRAVENOUS; SUBCUTANEOUS at 14:32

## 2018-06-28 RX ADMIN — HYDROMORPHONE HYDROCHLORIDE 1 MILLIGRAM(S): 2 INJECTION INTRAMUSCULAR; INTRAVENOUS; SUBCUTANEOUS at 05:10

## 2018-06-28 RX ADMIN — HYDROMORPHONE HYDROCHLORIDE 1 MILLIGRAM(S): 2 INJECTION INTRAMUSCULAR; INTRAVENOUS; SUBCUTANEOUS at 09:05

## 2018-06-28 RX ADMIN — HYDROMORPHONE HYDROCHLORIDE 4 MILLIGRAM(S): 2 INJECTION INTRAMUSCULAR; INTRAVENOUS; SUBCUTANEOUS at 19:39

## 2018-06-28 RX ADMIN — HYDROMORPHONE HYDROCHLORIDE 4 MILLIGRAM(S): 2 INJECTION INTRAMUSCULAR; INTRAVENOUS; SUBCUTANEOUS at 20:15

## 2018-06-28 RX ADMIN — CARVEDILOL PHOSPHATE 12.5 MILLIGRAM(S): 80 CAPSULE, EXTENDED RELEASE ORAL at 17:07

## 2018-06-28 RX ADMIN — Medication 3 MILLILITER(S): at 23:05

## 2018-06-28 RX ADMIN — Medication 50 MILLIGRAM(S): at 23:04

## 2018-06-28 RX ADMIN — HYDROMORPHONE HYDROCHLORIDE 1 MILLIGRAM(S): 2 INJECTION INTRAMUSCULAR; INTRAVENOUS; SUBCUTANEOUS at 17:55

## 2018-06-28 RX ADMIN — HEPARIN SODIUM 5000 UNIT(S): 5000 INJECTION INTRAVENOUS; SUBCUTANEOUS at 23:04

## 2018-06-28 RX ADMIN — HYDROMORPHONE HYDROCHLORIDE 1 MILLIGRAM(S): 2 INJECTION INTRAMUSCULAR; INTRAVENOUS; SUBCUTANEOUS at 08:50

## 2018-06-28 RX ADMIN — OXYCODONE HYDROCHLORIDE 10 MILLIGRAM(S): 5 TABLET ORAL at 00:44

## 2018-06-28 RX ADMIN — HYDROMORPHONE HYDROCHLORIDE 2 MILLIGRAM(S): 2 INJECTION INTRAMUSCULAR; INTRAVENOUS; SUBCUTANEOUS at 14:00

## 2018-06-28 RX ADMIN — HYDROMORPHONE HYDROCHLORIDE 4 MILLIGRAM(S): 2 INJECTION INTRAMUSCULAR; INTRAVENOUS; SUBCUTANEOUS at 15:26

## 2018-06-28 RX ADMIN — HYDROMORPHONE HYDROCHLORIDE 1 MILLIGRAM(S): 2 INJECTION INTRAMUSCULAR; INTRAVENOUS; SUBCUTANEOUS at 21:20

## 2018-06-28 RX ADMIN — HYDROMORPHONE HYDROCHLORIDE 1 MILLIGRAM(S): 2 INJECTION INTRAMUSCULAR; INTRAVENOUS; SUBCUTANEOUS at 04:55

## 2018-06-28 RX ADMIN — GABAPENTIN 300 MILLIGRAM(S): 400 CAPSULE ORAL at 14:32

## 2018-06-28 RX ADMIN — HYDROMORPHONE HYDROCHLORIDE 1 MILLIGRAM(S): 2 INJECTION INTRAMUSCULAR; INTRAVENOUS; SUBCUTANEOUS at 10:27

## 2018-06-28 RX ADMIN — HYDROMORPHONE HYDROCHLORIDE 1 MILLIGRAM(S): 2 INJECTION INTRAMUSCULAR; INTRAVENOUS; SUBCUTANEOUS at 17:40

## 2018-06-28 RX ADMIN — TIZANIDINE 2 MILLIGRAM(S): 4 TABLET ORAL at 17:07

## 2018-06-28 RX ADMIN — HYDROMORPHONE HYDROCHLORIDE 1 MILLIGRAM(S): 2 INJECTION INTRAMUSCULAR; INTRAVENOUS; SUBCUTANEOUS at 12:05

## 2018-06-28 RX ADMIN — HYDROMORPHONE HYDROCHLORIDE 1 MILLIGRAM(S): 2 INJECTION INTRAMUSCULAR; INTRAVENOUS; SUBCUTANEOUS at 10:12

## 2018-06-28 RX ADMIN — OXYCODONE HYDROCHLORIDE 10 MILLIGRAM(S): 5 TABLET ORAL at 01:40

## 2018-06-28 RX ADMIN — Medication 200 MILLIGRAM(S): at 23:04

## 2018-06-28 RX ADMIN — HYDROMORPHONE HYDROCHLORIDE 2 MILLIGRAM(S): 2 INJECTION INTRAMUSCULAR; INTRAVENOUS; SUBCUTANEOUS at 13:06

## 2018-06-28 RX ADMIN — HALOPERIDOL DECANOATE 1 MILLIGRAM(S): 100 INJECTION INTRAMUSCULAR at 10:29

## 2018-06-28 RX ADMIN — GABAPENTIN 300 MILLIGRAM(S): 400 CAPSULE ORAL at 10:29

## 2018-06-28 RX ADMIN — QUETIAPINE FUMARATE 150 MILLIGRAM(S): 200 TABLET, FILM COATED ORAL at 23:04

## 2018-06-28 RX ADMIN — HYDROMORPHONE HYDROCHLORIDE 1 MILLIGRAM(S): 2 INJECTION INTRAMUSCULAR; INTRAVENOUS; SUBCUTANEOUS at 11:45

## 2018-06-28 RX ADMIN — HYDROMORPHONE HYDROCHLORIDE 4 MILLIGRAM(S): 2 INJECTION INTRAMUSCULAR; INTRAVENOUS; SUBCUTANEOUS at 14:32

## 2018-06-28 RX ADMIN — CARVEDILOL PHOSPHATE 12.5 MILLIGRAM(S): 80 CAPSULE, EXTENDED RELEASE ORAL at 06:06

## 2018-06-28 RX ADMIN — TRAMADOL HYDROCHLORIDE 50 MILLIGRAM(S): 50 TABLET ORAL at 14:32

## 2018-06-28 RX ADMIN — HYDROMORPHONE HYDROCHLORIDE 1 MILLIGRAM(S): 2 INJECTION INTRAMUSCULAR; INTRAVENOUS; SUBCUTANEOUS at 21:02

## 2018-06-28 NOTE — H&P ADULT - REASON FOR ADMISSION
Direct Admission - Transfer from [facility] for Periprosthetic R Femur Fracture

## 2018-06-28 NOTE — H&P ADULT - HISTORY OF PRESENT ILLNESS
Ortho H&P    HPI: 73 y/o F with PMH of HTN, CAD, EtOH abuse, Emphysema, anxiety and R fem neck fracture s/p CRPP on 10/17. Underwent R USAMA with Addison on 18. She presents now to [facility] as a direct admit from an OSH. Patient states that she tripped over garbage cans in the kitchen and falling. She denies any LOC, heads trike or any other trauma. She was transferred to [facility] by EMS for management. Once there she was placed in femoral traction with distal femoral pins. Daughter agreed to transfer to [facility]. Denies f/c/v/cp/sob/palpitations/cough/abd.pain/d/c/dysuria/hematuria.      PMH  PAST MEDICAL & SURGICAL HISTORY:  Pain of right hip joint  Migraine  Alcohol abuse  Seizure  Stented coronary artery  Coronary artery disease  Anxiety  HTN (hypertension)  Emphysema, unspecified  Anxiety  Migraine  CAD (coronary artery disease)  Hyperlipidemia  Hypertension  No significant past surgical history  S/P bladder repair    PSH    MEDS    Allergies    No Known Allergies    Intolerances        Social        Physical Exam  T(C): 36.8 (18 @ 08:55), Max: 37.2 (18 @ 19:56)  HR: 73 (18 @ 08:55) (72 - 85)  BP: 153/62 (18 @ 08:55) (153/62 - 183/93)  RR: 16 (18 @ 08:55) (16 - 20)  SpO2: 99% (18 @ 08:55) (95% - 100%)  Wt(kg): --  Tmax: T(C): , Max: 37.2 (18 @ 19:56)  Wt(kg): --    Gen: NAD  HEENT: normocephalic, atraumatic, no scleral icterus  CV: S1, S2, RRR  Pulm: CTA B/L  Abd: Soft, ND, NTP, no rebound, no guarding, no palpable organomegaly/masses  Ext: warm, no edema, palp dp/pt    18  -  18  --------------------------------------------------------  IN:  Total IN: 0 mL    OUT:    Indwelling Catheter - Urethral: 1900 mL  Total OUT: 1900 mL    Total NET: -1900 mL      18  -  18  --------------------------------------------------------  IN:  Total IN: 0 mL    OUT:    Indwelling Catheter - Urethral: 700 mL  Total OUT: 700 mL    Total NET: -700 mL          Labs:                        7.1    8.95  )-----------( 290      ( 2018 06:15 )             21.9         135  |  99  |  19  ----------------------------<  108<H>  3.6   |  24  |  0.84    Ca    7.9<L>      2018 06:15      PT/INR - ( 2018 06:15 )   PT: 13.5 SEC;   INR: 1.21          PTT - ( 2018 06:15 )  PTT:27.0 SEC  Urinalysis Basic - ( 2018 20:11 )    Color: PLYEL / Appearance: CLEAR / S.011 / pH: 5.5  Gluc: NEGATIVE / Ketone: NEGATIVE  / Bili: NEGATIVE / Urobili: NORMAL mg/dL   Blood: MODERATE / Protein: 30 mg/dL / Nitrite: NEGATIVE   Leuk Esterase: TRACE / RBC: 10-25 / WBC 2-5   Sq Epi: x / Non Sq Epi: x / Bacteria: x        Imaging  XR R hip:  -R hip periprosthetic femur fracture with a stem that is loose. [place] B2.

## 2018-06-28 NOTE — H&P ADULT - ASSESSMENT
71 y/o F who underwent recent R USAMA with Dr. Jaime who now presents with R USAMA periprosthetic fracture.     -Admit to Ortho  -pain control  -WBAT in Knee immobilizer  -OOB  -DVT ppx: Venodynes  -Pre op labs and tests  -Morning labs   -Obtain med clearance  -OR planning with Dr. Jaime

## 2018-06-28 NOTE — H&P ADULT - NSHPPHYSICALEXAM_GEN_ALL_CORE
RLE - Traction pin intact to distal femur.  Shortened ext rotated.  Sensation grossly intact.  Calves soft/NT.  +Distal pulses.  +DF/PF.

## 2018-06-28 NOTE — DISCHARGE NOTE ADULT - CARE PROVIDER_API CALL
Ishan Jaime), Orthopaedic Surgery  [address] [address]  Phone: [phone]  Fax: [phone]

## 2018-06-28 NOTE — DISCHARGE NOTE ADULT - PATIENT PORTAL LINK FT
You can access the [facility] Patient Portal, offered by [facility], by registering with the following website: [email or web address]

## 2018-06-28 NOTE — CONSULT NOTE ADULT - SUBJECTIVE AND OBJECTIVE BOX
Patient is a 72y old  Female who presents with a chief complaint of R hip periprosthetic fracture (2018 09:08)      HPI:  Ortho H&P    HPI: 73 y/o F with PMH of HTN, CAD, EtOH abuse, Emphysema, anxiety and R fem neck fracture s/p CRPP on 10/17. Underwent R USAMA with Rasquinha on 18. She presents now to [facility] as a direct admit from an OSH. Patient states that she tripped over garbage cans in the kitchen and falling. She denies any LOC, heads trike or any other trauma. She was transferred to [facility] by EMS for management. Once there she was placed in femoral traction with distal femoral pins. Daughter agreed to transfer to [facility]. Denies f/c/v/cp/sob/palpitations/cough/abd.pain/d/c/dysuria/hematuria.      PMH  PAST MEDICAL & SURGICAL HISTORY:  Pain of right hip joint  Migraine  Alcohol abuse  Seizure  Stented coronary artery  Coronary artery disease  Anxiety  HTN (hypertension)  Emphysema, unspecified  Anxiety  Migraine  CAD (coronary artery disease)  Hyperlipidemia  Hypertension  No significant past surgical history  S/P bladder repair    PSH    MEDS    Allergies    No Known Allergies    Intolerances        Social        Physical Exam  T(C): 36.8 (18 @ 08:55), Max: 37.2 (18 @ 19:56)  HR: 73 (18 @ 08:55) (72 - 85)  BP: 153/62 (18 @ 08:55) (153/62 - 183/93)  RR: 16 (18 @ 08:55) (16 - 20)  SpO2: 99% (18 @ 08:55) (95% - 100%)  Wt(kg): --  Tmax: T(C): , Max: 37.2 (18 @ 19:56)  Wt(kg): --    Gen: NAD  HEENT: normocephalic, atraumatic, no scleral icterus  CV: S1, S2, RRR  Pulm: CTA B/L  Abd: Soft, ND, NTP, no rebound, no guarding, no palpable organomegaly/masses  Ext: warm, no edema, palp dp/pt    18  -  18  --------------------------------------------------------  IN:  Total IN: 0 mL    OUT:    Indwelling Catheter - Urethral: 1900 mL  Total OUT: 1900 mL    Total NET: -1900 mL      -  -  18  --------------------------------------------------------  IN:  Total IN: 0 mL    OUT:    Indwelling Catheter - Urethral: 700 mL  Total OUT: 700 mL    Total NET: -700 mL          Labs:                        7.1    8.95  )-----------( 290      ( 2018 06:15 )             21.9     -    135  |  99  |  19  ----------------------------<  108<H>  3.6   |  24  |  0.84    Ca    7.9<L>      2018 06:15      PT/INR - ( 2018 06:15 )   PT: 13.5 SEC;   INR: 1.21          PTT - ( 2018 06:15 )  PTT:27.0 SEC  Urinalysis Basic - ( 2018 20:11 )    Color: PLYEL / Appearance: CLEAR / S.011 / pH: 5.5  Gluc: NEGATIVE / Ketone: NEGATIVE  / Bili: NEGATIVE / Urobili: NORMAL mg/dL   Blood: MODERATE / Protein: 30 mg/dL / Nitrite: NEGATIVE   Leuk Esterase: TRACE / RBC: 10-25 / WBC 2-5   Sq Epi: x / Non Sq Epi: x / Bacteria: x        Imaging  XR R hip:  -R hip periprosthetic femur fracture with a stem that is loose. [place] B2. (2018 09:08)      PAST MEDICAL & SURGICAL HISTORY:  Pain of right hip joint  Migraine  Alcohol abuse  Seizure  Stented coronary artery  Coronary artery disease  Anxiety  HTN (hypertension)  Emphysema, unspecified  Anxiety  Migraine  CAD (coronary artery disease)  Hyperlipidemia  Hypertension  No significant past surgical history  S/P bladder repair      MEDICATIONS  (STANDING):  carvedilol 12.5 milliGRAM(s) Oral every 12 hours  docusate sodium 100 milliGRAM(s) Oral two times a day  gabapentin 300 milliGRAM(s) Oral three times a day  heparin  Injectable 5000 Unit(s) SubCutaneous every 8 hours  levoFLOXacin IVPB 500 milliGRAM(s) IV Intermittent every 24 hours  nicotine  Polacrilex Gum 4 milliGRAM(s) Oral every 3 hours  senna 2 Tablet(s) Oral at bedtime  sodium chloride 0.9%. 1000 milliLiter(s) (75 mL/Hr) IV Continuous <Continuous>  traMADol 50 milliGRAM(s) Oral every 8 hours    MEDICATIONS  (PRN):  haloperidol    Injectable 1 milliGRAM(s) IntraMuscular every 6 hours PRN Agitation  HYDROmorphone   Tablet 2 milliGRAM(s) Oral every 3 hours PRN mil;d pain  HYDROmorphone  Injectable 1 milliGRAM(s) IV Push every 4 hours PRN breakthrough pain  HYDROmorphone  Injectable 1 milliGRAM(s) IV Push every 4 hours PRN Severe Pain (7 - 10)  HYDROmorphone  Injectable 0.5 milliGRAM(s) IV Push every 4 hours PRN Moderate Pain (4 - 6)      ICU Vital Signs Last 24 Hrs  T(C): 36.9 (2018 09:32), Max: 37.2 (2018 19:56)  T(F): 98.4 (2018 09:32), Max: 98.9 (2018 19:56)  HR: 75 (2018 09:32) (72 - 85)  BP: 160/65 (2018 09:32) (153/62 - 183/93)  BP(mean): --  ABP: --  ABP(mean): --  RR: 18 (2018 09:32) (16 - 20)  SpO2: 97% (2018 09:32) (95% - 100%)      Vital Signs Last 24 Hrs  T(C): 36.9 (2018 09:32), Max: 37.2 (2018 19:56)  T(F): 98.4 (2018 09:32), Max: 98.9 (2018 19:56)  HR: 75 (2018 09:32) (72 - 85)  BP: 160/65 (2018 09:32) (153/62 - 183/93)  BP(mean): --  RR: 18 (2018 09:32) (16 - 20)  SpO2: 97% (2018 09:32) (95% - 100%)                          7.1    8.95  )-----------( 290      ( 2018 06:15 )             21.9           PT/INR - ( 2018 06:15 )   PT: 13.5 SEC;   INR: 1.21          PTT - ( 2018 06:15 )  PTT:27.0 SEC    Subjective: "I'm in so much pain. It's 10/10 and the little 2mg of dilaudid isn't doing anything for me. I need more, I was taking more when I was here before. The IV pain meds work why can't I get them."    Objective: Pt. A&Ox3, sitting up in bed with 1 to 1 at bedside. Very anxious and moments of crying to normal conversation. Answers questions appropriately, maintains eye contact.     Recommendations:   1. D/C IV dilaudid 1mg for severe pain, keep break through dose.  2. Add dilaudid 4mg for moderate pain and 6mg for severe pain Q3hr PRN.  3. Add Tizanidine 2mg Q6hr standing for 1 day and PRN afterwards, Hold for oversedation.   4. Consider adding NSAID of choice.   5. Contact Psych for better control of anxiety

## 2018-06-28 NOTE — PROGRESS NOTE ADULT - SUBJECTIVE AND OBJECTIVE BOX
Patient is seen and examined at bedside. Denies any CP / SOB / DIzziness / N /V /D/ HA.      Vital Signs Last 24 Hrs  T(C): 37 (28 Jun 2018 06:04), Max: 37.2 (27 Jun 2018 19:56)  T(F): 98.6 (28 Jun 2018 06:04), Max: 98.9 (27 Jun 2018 19:56)  HR: 77 (28 Jun 2018 06:04) (72 - 85)  BP: 176/60 (28 Jun 2018 06:04) (175/61 - 183/93)  BP(mean): --  RR: 18 (28 Jun 2018 06:04) (16 - 20)  SpO2: 98% (28 Jun 2018 06:04) (95% - 100%)    Gen: NAD    RLE: Traction Pin  RLE: Motor / Sensory intact distally. Compartments are soft BL. Extremities are warm.  DP 2+ BL LE.    Labs:                        7.1    8.95  )-----------( 290      ( 28 Jun 2018 06:15 )             21.9     06-27    134<L>  |  97<L>  |  24<H>  ----------------------------<  123<H>  4.1   |  23  |  0.99    Ca    8.5      27 Jun 2018 20:00        A/P: Patient is a 73 y/o Female Pending OR    - Pain control / Analgesia  - Inc Spirometry   - Venodynes  - SQH  -Levaquin  - FU Blood cultures   -FU Dr Mccann recs  - Notify ortho with questions

## 2018-06-28 NOTE — DISCHARGE NOTE ADULT - HOSPITAL COURSE
71 y/o F with PMH of HTN, CAD, EtOH abuse, Emphysema, anxiety and R fem neck fracture s/p CRPP on 10/17. Underwent R USAMA with Addison on 5/21/18 after femoral head collapse occurred. She presented to [facility]  on 5/25 as a direct admit from an OSH. Patient states that she tripped over garbage cans in the kitchen and falling. She denies any LOC, heads trike or any other trauma. She was transferred to [facility] by EMS for management. Once there she was placed in femoral traction with distal femoral pins. Daughter agreed to transfer to [facility] for treatment with Dr. Jaime. Patient's care was transferred to Dr. Be, one of Dr. Jaime's partners, due to an availability issues, and Dr. Be requested transfer to [facility] for surgical management because of necessary equipment and OR block time on 6/30/18. Patient in stable condition and transferred to [facility].

## 2018-06-28 NOTE — DISCHARGE NOTE ADULT - MEDICATION SUMMARY - MEDICATIONS TO TAKE
I will START or STAY ON the medications listed below when I get home from the hospital:    traMADol 50 mg oral tablet  -- 1 tab(s) by mouth every 8 hours  -- Indication: For Pain    HYDROmorphone 2 mg oral tablet  -- 1 tab(s) by mouth every 3 hours, As needed, Moderate Pain (4 - 6)  -- Indication: For Pain    HYDROmorphone 4 mg oral tablet  -- 1 tab(s) by mouth every 3 hours, As needed, Severe Pain (7 - 10)  -- Indication: For Pain    heparin  -- 5 milligram(s) subcutaneous every 8 hours  -- Indication: For DVT ppx    gabapentin 300 mg oral capsule  -- 1 cap(s) by mouth 3 times a day  -- Indication: For Pain    QUEtiapine 50 mg oral tablet  -- 1 tab(s) by mouth every 6 hours, As needed, anxiety/insomnia  -- Indication: For Agitation    QUEtiapine 50 mg oral tablet  -- 3 tab(s) by mouth once a day (at bedtime)  -- Indication: For Agitation    carvedilol 12.5 mg oral tablet  -- 1 tab(s) by mouth every 12 hours  -- Indication: For HTN    docusate sodium 100 mg oral capsule  -- 1 cap(s) by mouth 2 times a day  -- Indication: For Constipation    senna oral tablet  -- 2 tab(s) by mouth once a day (at bedtime)  -- Indication: For Constipation    polyethylene glycol 3350 oral powder for reconstitution  -- 17 gram(s) by mouth once a day  -- Indication: For Constipation    tiZANidine 2 mg oral tablet  -- 1 tab(s) by mouth every 6 hours  -- Indication: For Muscle relaxant    tiZANidine 2 mg oral tablet  -- 1 tab(s) by mouth every 6 hours, As needed, spasms  -- Indication: For Muscle relaxant    pantoprazole 40 mg oral delayed release tablet  -- 1 tab(s) by mouth once a day  -- Indication: For GERD    levoFLOXacin 25 mg/mL intravenous solution  -- 20 milliliter(s) intravenous every 24 hours  -- Indication: For PNA    nicotine 21 mg/24 hr transdermal film, extended release  -- 1 patch by transdermal patch once a day  -- Indication: For Nicotine dependance     Os-Jai 250 with D oral tablet  -- 1 tab(s) by mouth 3 times a day  -- Indication: For Supplement    folic acid 1 mg oral tablet  -- 1 tab(s) by mouth once a day  -- Indication: For Vitamin    thiamine 100 mg oral tablet  -- 1 tab(s) by mouth once a day  -- Indication: For Vitamin

## 2018-06-28 NOTE — H&P ADULT - PROBLEM SELECTOR PLAN 1
-Medical Clearance  -ORIF R Periprosthetic Femur Fx  -For OR Saturday pending medical clearance  -Surgical Consent

## 2018-06-28 NOTE — H&P ADULT - HISTORY OF PRESENT ILLNESS
Pt is a 73 y/o female transferred from [facility] this evening s/p mechanical fall in her kitchen a few days ago.  Pt admits to tripping over a garbage can in her kitchen and falling.  She was taken by ambulance to [facility] and subsequently transferred to [facility].  Pt is a very poor historian and exact dates of the trip and fall and transfers are not readily available.  Pt now transferred to [facility] for definitive treatment of a R periprosthetic femur fracture.

## 2018-06-28 NOTE — DISCHARGE NOTE ADULT - CARE PLAN
Principal Discharge DX:	Closed fracture of right hip, initial encounter  Goal:	Surgical management of R periprosthetic femur fracture  Assessment and plan of treatment:	Transfer to [facility], Dr. Be for definitive management

## 2018-06-28 NOTE — CONSULT NOTE ADULT - SUBJECTIVE AND OBJECTIVE BOX
Pt is a 73 y/o female transferred from [facility] this evening s/p mechanical fall in her kitchen a few days ago.  Pt admits to tripping over a garbage can in her kitchen and falling.  She was taken by ambulance to [facility] and subsequently transferred to [facility].  Pt is a very poor historian and exact dates of the trip and fall and transfers are not readily available.  Pt now transferred to [facility] for definitive treatment of a R periprosthetic femur fracture.  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  S/P bladder repair        MEDICATIONS  (STANDING):  docusate sodium 100 milliGRAM(s) Oral three times a day  heparin  Injectable 5000 Unit(s) SubCutaneous every 8 hours  hydrALAZINE 50 milliGRAM(s) Oral every 8 hours  labetalol 200 milliGRAM(s) Oral three times a day  nicotine - 21 mG/24Hr(s) Patch 1 patch Transdermal daily  NIFEdipine XL 90 milliGRAM(s) Oral daily  pantoprazole    Tablet 40 milliGRAM(s) Oral before breakfast  QUEtiapine 150 milliGRAM(s) Oral at bedtime  senna 2 Tablet(s) Oral at bedtime  thiamine 100 milliGRAM(s) Oral daily  traMADol 50 milliGRAM(s) Oral every 8 hours    MEDICATIONS  (PRN):  acetaminophen   Tablet 650 milliGRAM(s) Oral every 6 hours PRN For Temp greater than 38 C (100.4 F)  acetaminophen   Tablet. 650 milliGRAM(s) Oral every 6 hours PRN Mild Pain (1 - 3) or HA  cyclobenzaprine 5 milliGRAM(s) Oral three times a day PRN Muscle Spasm  HYDROmorphone   Tablet 2 milliGRAM(s) Oral every 3 hours PRN Moderate Pain (4 - 6)  HYDROmorphone   Tablet 4 milliGRAM(s) Oral every 4 hours PRN Severe Pain (7 - 10)  HYDROmorphone  Injectable 1 milliGRAM(s) IV Push every 4 hours PRN Breakthrough Pain  ondansetron Injectable 4 milliGRAM(s) IV Push every 6 hours PRN Nausea and/or Vomiting  QUEtiapine 50 milliGRAM(s) Oral every 6 hours PRN Anxiety    Soc Hx:  Tobacco: current smoker 1PPD  ETOH: Neg  Drugs Neg    Family Hx  as per my conversation with the pt, non contributory      CONSTITUTIONAL: No weakness, fevers or chills  EYES/ENT: No visual changes;  No vertigo or throat pain   NECK: No pain or stiffness  RESPIRATORY: No cough, wheezing, hemoptysis; No shortness of breath  CARDIOVASCULAR: No chest pain or palpitations  GASTROINTESTINAL: No abdominal or epigastric pain. No nausea, vomiting, or hematemesis; No diarrhea or constipation. No melena or hematochezia.  GENITOURINARY: No dysuria, frequency or hematuria  NEUROLOGICAL: No numbness or weakness  SKIN: No itching, burning, rashes, or lesions   MUSCULOSKELETAL: right leg pain    INTERVAL HPI/OVERNIGHT EVENTS:  T(C): 36.6 (18 @ 18:49), Max: 37 (18 @ 06:04)  HR: 66 (18 @ 18:49) (66 - 97)  BP: 181/89 (18 @ 18:49) (125/92 - 195/74)  RR: 18 (18 @ 18:49) (16 - 20)  SpO2: 95% (18 @ 18:49) (95% - 100%)  Wt(kg): --  I&O's Summary      PHYSICAL EXAM:  GENERAL: NAD, well-groomed, well-developed  HEAD:  Atraumatic, Normocephalic  EYES: EOMI, PERRLA, conjunctiva and sclera clear  ENMT: No tonsillar erythema, exudates, or enlargement; Moist mucous membranes, Good dentition, No lesions  NECK: Supple, No JVD, Normal thyroid  NERVOUS SYSTEM:  Alert & Oriented X3, Good concentration; Motor Strength 5/5 B/L upper and lower extremities; DTRs 2+ intact and symmetric  CHEST/LUNG: Clear to percussion bilaterally; No rales, rhonchi, wheezing, or rubs  HEART: Regular rate and rhythm; No murmurs, rubs, or gallops  ABDOMEN: Soft, Nontender, Nondistended; Bowel sounds present  EXTREMITIES:  2+ Peripheral Pulses, No clubbing, cyanosis, or edema  LYMPH: No lymphadenopathy noted  SKIN: No rashes or lesions        LABS:                        7.1    8.95  )-----------( 290      ( 2018 06:15 )             21.9     06-28    135  |  99  |  19  ----------------------------<  108<H>  3.6   |  24  |  0.84    Ca    7.9<L>      2018 06:15      PT/INR - ( 2018 06:15 )   PT: 13.5 SEC;   INR: 1.21          PTT - ( 2018 06:15 )  PTT:27.0 SEC  Urinalysis Basic - ( 2018 20:11 )    Color: PLYEL / Appearance: CLEAR / S.011 / pH: 5.5  Gluc: NEGATIVE / Ketone: NEGATIVE  / Bili: NEGATIVE / Urobili: NORMAL mg/dL   Blood: MODERATE / Protein: 30 mg/dL / Nitrite: NEGATIVE   Leuk Esterase: TRACE / RBC: 10-25 / WBC 2-5   Sq Epi: x / Non Sq Epi: x / Bacteria: x      EKG Pending        Urinalysis Basic - ( 2018 20:11 )    Color: PLYEL / Appearance: CLEAR / S.011 / pH: 5.5  Gluc: NEGATIVE / Ketone: NEGATIVE  / Bili: NEGATIVE / Urobili: NORMAL mg/dL   Blood: MODERATE / Protein: 30 mg/dL / Nitrite: NEGATIVE   Leuk Esterase: TRACE / RBC: 10-25 / WBC 2-5   Sq Epi: x / Non Sq Epi: x / Bacteria: x    EKG: NSR @ 74 P Pulmonale    Radiology reports:

## 2018-06-28 NOTE — DISCHARGE NOTE ADULT - PLAN OF CARE
Surgical management of R periprosthetic femur fracture Transfer to [facility], Dr. Be for definitive management

## 2018-06-29 ENCOUNTER — TRANSCRIPTION ENCOUNTER (OUTPATIENT)
Age: 73
End: 2018-06-29

## 2018-06-29 LAB
ANION GAP SERPL CALC-SCNC: 14 MMOL/L — SIGNIFICANT CHANGE UP (ref 5–17)
BUN SERPL-MCNC: 20 MG/DL — SIGNIFICANT CHANGE UP (ref 7–23)
CALCIUM SERPL-MCNC: 8.5 MG/DL — SIGNIFICANT CHANGE UP (ref 8.4–10.5)
CHLORIDE SERPL-SCNC: 100 MMOL/L — SIGNIFICANT CHANGE UP (ref 96–108)
CO2 SERPL-SCNC: 26 MMOL/L — SIGNIFICANT CHANGE UP (ref 22–31)
CREAT SERPL-MCNC: 0.96 MG/DL — SIGNIFICANT CHANGE UP (ref 0.5–1.3)
GLUCOSE SERPL-MCNC: 94 MG/DL — SIGNIFICANT CHANGE UP (ref 70–99)
HCT VFR BLD CALC: 31.6 % — LOW (ref 34.5–45)
HGB BLD-MCNC: 10.4 G/DL — LOW (ref 11.5–15.5)
INR BLD: 1.16 RATIO — SIGNIFICANT CHANGE UP (ref 0.88–1.16)
MCHC RBC-ENTMCNC: 30.3 PG — SIGNIFICANT CHANGE UP (ref 27–34)
MCHC RBC-ENTMCNC: 32.9 GM/DL — SIGNIFICANT CHANGE UP (ref 32–36)
MCV RBC AUTO: 92.1 FL — SIGNIFICANT CHANGE UP (ref 80–100)
PLATELET # BLD AUTO: 316 K/UL — SIGNIFICANT CHANGE UP (ref 150–400)
POTASSIUM SERPL-MCNC: 3.6 MMOL/L — SIGNIFICANT CHANGE UP (ref 3.5–5.3)
POTASSIUM SERPL-SCNC: 3.6 MMOL/L — SIGNIFICANT CHANGE UP (ref 3.5–5.3)
PROTHROM AB SERPL-ACNC: 13.2 SEC — HIGH (ref 10–13.1)
RBC # BLD: 3.43 M/UL — LOW (ref 3.8–5.2)
RBC # FLD: 15.2 % — HIGH (ref 10.3–14.5)
SODIUM SERPL-SCNC: 140 MMOL/L — SIGNIFICANT CHANGE UP (ref 135–145)
SPECIMEN SOURCE: SIGNIFICANT CHANGE UP
WBC # BLD: 9.11 K/UL — SIGNIFICANT CHANGE UP (ref 3.8–10.5)
WBC # FLD AUTO: 9.11 K/UL — SIGNIFICANT CHANGE UP (ref 3.8–10.5)

## 2018-06-29 RX ORDER — SODIUM CHLORIDE 9 MG/ML
1000 INJECTION, SOLUTION INTRAVENOUS
Qty: 0 | Refills: 0 | Status: DISCONTINUED | OUTPATIENT
Start: 2018-06-29 | End: 2018-06-30

## 2018-06-29 RX ORDER — IPRATROPIUM/ALBUTEROL SULFATE 18-103MCG
3 AEROSOL WITH ADAPTER (GRAM) INHALATION EVERY 6 HOURS
Qty: 0 | Refills: 0 | Status: DISCONTINUED | OUTPATIENT
Start: 2018-06-29 | End: 2018-06-30

## 2018-06-29 RX ORDER — HEPARIN SODIUM 5000 [USP'U]/ML
5000 INJECTION INTRAVENOUS; SUBCUTANEOUS EVERY 8 HOURS
Qty: 0 | Refills: 0 | Status: COMPLETED | OUTPATIENT
Start: 2018-06-29 | End: 2018-06-29

## 2018-06-29 RX ORDER — HYDROMORPHONE HYDROCHLORIDE 2 MG/ML
2 INJECTION INTRAMUSCULAR; INTRAVENOUS; SUBCUTANEOUS ONCE
Qty: 0 | Refills: 0 | Status: DISCONTINUED | OUTPATIENT
Start: 2018-06-29 | End: 2018-06-29

## 2018-06-29 RX ORDER — HYDROMORPHONE HYDROCHLORIDE 2 MG/ML
6 INJECTION INTRAMUSCULAR; INTRAVENOUS; SUBCUTANEOUS EVERY 4 HOURS
Qty: 0 | Refills: 0 | Status: DISCONTINUED | OUTPATIENT
Start: 2018-06-29 | End: 2018-06-30

## 2018-06-29 RX ORDER — HYDROMORPHONE HYDROCHLORIDE 2 MG/ML
4 INJECTION INTRAMUSCULAR; INTRAVENOUS; SUBCUTANEOUS
Qty: 0 | Refills: 0 | Status: DISCONTINUED | OUTPATIENT
Start: 2018-06-29 | End: 2018-06-30

## 2018-06-29 RX ORDER — BUDESONIDE, MICRONIZED 100 %
0.5 POWDER (GRAM) MISCELLANEOUS
Qty: 0 | Refills: 0 | Status: DISCONTINUED | OUTPATIENT
Start: 2018-06-29 | End: 2018-06-30

## 2018-06-29 RX ADMIN — HYDROMORPHONE HYDROCHLORIDE 2 MILLIGRAM(S): 2 INJECTION INTRAMUSCULAR; INTRAVENOUS; SUBCUTANEOUS at 18:06

## 2018-06-29 RX ADMIN — Medication 50 MILLIGRAM(S): at 23:09

## 2018-06-29 RX ADMIN — HYDROMORPHONE HYDROCHLORIDE 1 MILLIGRAM(S): 2 INJECTION INTRAMUSCULAR; INTRAVENOUS; SUBCUTANEOUS at 10:54

## 2018-06-29 RX ADMIN — HYDROMORPHONE HYDROCHLORIDE 1 MILLIGRAM(S): 2 INJECTION INTRAMUSCULAR; INTRAVENOUS; SUBCUTANEOUS at 14:56

## 2018-06-29 RX ADMIN — Medication 200 MILLIGRAM(S): at 14:14

## 2018-06-29 RX ADMIN — HYDROMORPHONE HYDROCHLORIDE 4 MILLIGRAM(S): 2 INJECTION INTRAMUSCULAR; INTRAVENOUS; SUBCUTANEOUS at 17:47

## 2018-06-29 RX ADMIN — HYDROMORPHONE HYDROCHLORIDE 6 MILLIGRAM(S): 2 INJECTION INTRAMUSCULAR; INTRAVENOUS; SUBCUTANEOUS at 20:50

## 2018-06-29 RX ADMIN — HYDROMORPHONE HYDROCHLORIDE 1 MILLIGRAM(S): 2 INJECTION INTRAMUSCULAR; INTRAVENOUS; SUBCUTANEOUS at 14:41

## 2018-06-29 RX ADMIN — Medication 50 MILLIGRAM(S): at 14:14

## 2018-06-29 RX ADMIN — HYDROMORPHONE HYDROCHLORIDE 1 MILLIGRAM(S): 2 INJECTION INTRAMUSCULAR; INTRAVENOUS; SUBCUTANEOUS at 19:07

## 2018-06-29 RX ADMIN — HYDROMORPHONE HYDROCHLORIDE 4 MILLIGRAM(S): 2 INJECTION INTRAMUSCULAR; INTRAVENOUS; SUBCUTANEOUS at 00:40

## 2018-06-29 RX ADMIN — HYDROMORPHONE HYDROCHLORIDE 4 MILLIGRAM(S): 2 INJECTION INTRAMUSCULAR; INTRAVENOUS; SUBCUTANEOUS at 12:49

## 2018-06-29 RX ADMIN — HEPARIN SODIUM 5000 UNIT(S): 5000 INJECTION INTRAVENOUS; SUBCUTANEOUS at 14:13

## 2018-06-29 RX ADMIN — HYDROMORPHONE HYDROCHLORIDE 4 MILLIGRAM(S): 2 INJECTION INTRAMUSCULAR; INTRAVENOUS; SUBCUTANEOUS at 13:19

## 2018-06-29 RX ADMIN — HYDROMORPHONE HYDROCHLORIDE 1 MILLIGRAM(S): 2 INJECTION INTRAMUSCULAR; INTRAVENOUS; SUBCUTANEOUS at 23:10

## 2018-06-29 RX ADMIN — HYDROMORPHONE HYDROCHLORIDE 4 MILLIGRAM(S): 2 INJECTION INTRAMUSCULAR; INTRAVENOUS; SUBCUTANEOUS at 17:17

## 2018-06-29 RX ADMIN — Medication 90 MILLIGRAM(S): at 06:27

## 2018-06-29 RX ADMIN — Medication 200 MILLIGRAM(S): at 23:09

## 2018-06-29 RX ADMIN — HEPARIN SODIUM 5000 UNIT(S): 5000 INJECTION INTRAVENOUS; SUBCUTANEOUS at 06:07

## 2018-06-29 RX ADMIN — HYDROMORPHONE HYDROCHLORIDE 4 MILLIGRAM(S): 2 INJECTION INTRAMUSCULAR; INTRAVENOUS; SUBCUTANEOUS at 08:16

## 2018-06-29 RX ADMIN — HYDROMORPHONE HYDROCHLORIDE 1 MILLIGRAM(S): 2 INJECTION INTRAMUSCULAR; INTRAVENOUS; SUBCUTANEOUS at 23:30

## 2018-06-29 RX ADMIN — CYCLOBENZAPRINE HYDROCHLORIDE 5 MILLIGRAM(S): 10 TABLET, FILM COATED ORAL at 17:23

## 2018-06-29 RX ADMIN — PANTOPRAZOLE SODIUM 40 MILLIGRAM(S): 20 TABLET, DELAYED RELEASE ORAL at 06:27

## 2018-06-29 RX ADMIN — Medication 50 MILLIGRAM(S): at 06:27

## 2018-06-29 RX ADMIN — Medication 200 MILLIGRAM(S): at 06:27

## 2018-06-29 RX ADMIN — Medication 200 MILLIGRAM(S): at 18:52

## 2018-06-29 RX ADMIN — HYDROMORPHONE HYDROCHLORIDE 1 MILLIGRAM(S): 2 INJECTION INTRAMUSCULAR; INTRAVENOUS; SUBCUTANEOUS at 18:52

## 2018-06-29 RX ADMIN — Medication 3 MILLILITER(S): at 06:27

## 2018-06-29 RX ADMIN — HYDROMORPHONE HYDROCHLORIDE 4 MILLIGRAM(S): 2 INJECTION INTRAMUSCULAR; INTRAVENOUS; SUBCUTANEOUS at 07:46

## 2018-06-29 RX ADMIN — HYDROMORPHONE HYDROCHLORIDE 4 MILLIGRAM(S): 2 INJECTION INTRAMUSCULAR; INTRAVENOUS; SUBCUTANEOUS at 00:11

## 2018-06-29 RX ADMIN — HYDROMORPHONE HYDROCHLORIDE 2 MILLIGRAM(S): 2 INJECTION INTRAMUSCULAR; INTRAVENOUS; SUBCUTANEOUS at 17:35

## 2018-06-29 RX ADMIN — Medication 200 MILLIGRAM(S): at 10:04

## 2018-06-29 RX ADMIN — HYDROMORPHONE HYDROCHLORIDE 1 MILLIGRAM(S): 2 INJECTION INTRAMUSCULAR; INTRAVENOUS; SUBCUTANEOUS at 10:39

## 2018-06-29 RX ADMIN — HEPARIN SODIUM 5000 UNIT(S): 5000 INJECTION INTRAVENOUS; SUBCUTANEOUS at 23:35

## 2018-06-29 RX ADMIN — HYDROMORPHONE HYDROCHLORIDE 6 MILLIGRAM(S): 2 INJECTION INTRAMUSCULAR; INTRAVENOUS; SUBCUTANEOUS at 21:30

## 2018-06-29 NOTE — PROGRESS NOTE ADULT - SUBJECTIVE AND OBJECTIVE BOX
Patient is a 72y old  Female who presents with a chief complaint of Direct Admission - Transfer from [facility] for Periprosthetic R Femur Fracture (28 Jun 2018 19:22)      HPI:    MEDICATIONS  (STANDING):  docusate sodium 100 milliGRAM(s) Oral three times a day  heparin  Injectable 5000 Unit(s) SubCutaneous every 8 hours  hydrALAZINE 50 milliGRAM(s) Oral every 8 hours  labetalol 200 milliGRAM(s) Oral three times a day  lactated ringers. 1000 milliLiter(s) (80 mL/Hr) IV Continuous <Continuous>  levoFLOXacin IVPB 250 milliGRAM(s) IV Intermittent every 24 hours  nicotine - 21 mG/24Hr(s) Patch 1 patch Transdermal daily  NIFEdipine XL 90 milliGRAM(s) Oral daily  pantoprazole    Tablet 40 milliGRAM(s) Oral before breakfast  QUEtiapine 150 milliGRAM(s) Oral at bedtime  senna 2 Tablet(s) Oral at bedtime  thiamine 100 milliGRAM(s) Oral daily  traMADol 50 milliGRAM(s) Oral every 8 hours    MEDICATIONS  (PRN):  acetaminophen   Tablet 650 milliGRAM(s) Oral every 6 hours PRN For Temp greater than 38 C (100.4 F)  acetaminophen   Tablet. 650 milliGRAM(s) Oral every 6 hours PRN Mild Pain (1 - 3) or HA  ALBUTerol/ipratropium for Nebulization 3 milliLiter(s) Nebulizer every 6 hours PRN Shortness of Breath and/or Wheezing  cyclobenzaprine 5 milliGRAM(s) Oral three times a day PRN Muscle Spasm  guaiFENesin   Syrup  (Sugar-Free) 200 milliGRAM(s) Oral every 6 hours PRN Cough  HYDROmorphone   Tablet 2 milliGRAM(s) Oral every 3 hours PRN Moderate Pain (4 - 6)  HYDROmorphone   Tablet 4 milliGRAM(s) Oral every 4 hours PRN Severe Pain (7 - 10)  HYDROmorphone  Injectable 1 milliGRAM(s) IV Push every 4 hours PRN Breakthrough Pain  ondansetron Injectable 4 milliGRAM(s) IV Push every 6 hours PRN Nausea and/or Vomiting  QUEtiapine 50 milliGRAM(s) Oral every 6 hours PRN Anxiety      Allergies    No Known Allergies    Intolerances        REVIEW OF SYSTEM:  CONSTITUTIONAL: No fever, No change in weight, No fatigue  HEAD: No headache, No dizziness, No recent trauma  EYES: No eye pain, No visual disturbances, No discharge  ENT:  No difficulty hearing, No tinnitus, No vertigo, No sinus pain, No throat pain  NECK: No pain, No stiffness  BREASTS: No pain, No masses, No nipple discharge  RESPIRATORY: No cough, No wheezing, No chills, No hemoptysis, No shortness of breath at rest or exertional shortness of breath  CARDIOVASCULAR: No chest pain, No palpitations, No dizziness, No CHF, No arrhythmia, No cardiomegaly, No leg swelling  GASTROINTESTINAL: No abdominal, No epigastric pain. No nausea, No vomiting, No hematemesis, No diarrhea, No constipation. No melena, No hematochezia. No GERD  GENITOURINARY: No dysuria, No frequency, No hematuria, No incontinence, No nocturia, No hesitancy,  SKIN: No itching, No burning, No rashes, No lesions   LYMPH NODES: No history of enlarged glands  ENDOCRINE: No heat or cold intolerance, No hair loss. No osteoporosis, No thyroid disease  MUSCULOSKELETAL: No joint pain or swelling, No muscle, back, or extremity pain  PSYCHIATRIC: No depression, No anxiety, No mood swings, No difficulty sleeping  HEME/LYMPH: No easy bruising, No anticoagulants, No bleeding disorder, No bleeding gums  ALLERGY AND IMMUNOLOGIC: No hives, No eczema  NEUROLOGICAL: No memory loss, No loss of strength, No numbness, No tremors        VITALS:   T(C): 37.1 (06-29-18 @ 12:07), Max: 37.3 (06-29-18 @ 05:08)  HR: 75 (06-29-18 @ 12:07) (66 - 97)  BP: 166/61 (06-29-18 @ 12:07) (125/92 - 195/74)  RR: 18 (06-29-18 @ 12:07) (17 - 20)  SpO2: 94% (06-29-18 @ 12:07) (94% - 100%)  Wt(kg): --    06-28 @ 07:01  -  06-29 @ 07:00  --------------------------------------------------------  IN: 240 mL / OUT: 1650 mL / NET: -1410 mL    06-29 @ 07:01  -  06-29 @ 12:24  --------------------------------------------------------  IN: 240 mL / OUT: 0 mL / NET: 240 mL        PHYSICAL EXAM:  GENERAL: NAD, well nourished and conversant  HEAD:  Atraumatic  EYES: EOM, PERRLA, conjunctiva pink and sclera white  ENT: No tonsillar erythema, exudates, or enlargement, moist mucous membranes, good dentition, no lesions  NECK: Supple, No JVD, normal thyroid, carotids with normal upstrokes and no bruits  CHEST/LUNG: Clear to auscultation bilaterally, No rales, rhonchi, wheezing, or rubs  HEART: Regular rate and rhythm, No murmurs, rubs, or gallops  ABDOMEN: Soft, nondistended, no masses, guarding, tenderness or rebound, bowel sounds present  EXTREMITIES:  2+ Peripheral Pulses, No clubbing, cyanosis, or edema. No arthritis of shoulders, elbows, hands, hips, knees, ankles, or feet. No DJD C spine, T spine, or L/S spine  LYMPH: No lymphadenopathy noted  SKIN: No rashes or lesions  NERVOUS SYSTEM:  Alert & Oriented X3, normal cognitive function. Motor Strength 5/5 right upper and right lower.  5/5 left upper and left lower extremities, DTRs 2+ intact and symmetric    LABS:                          10.4   9.11  )-----------( 316      ( 29 Jun 2018 09:41 )             31.6     06-29    140  |  100  |  20  ----------------------------<  94  3.6   |  26  |  0.96  06-28    135  |  99  |  19  ----------------------------<  108<H>  3.6   |  24  |  0.84  06-27    134<L>  |  97<L>  |  24<H>  ----------------------------<  123<H>  4.1   |  23  |  0.99    Ca    8.5      29 Jun 2018 07:27  Ca    7.9<L>      28 Jun 2018 06:15  Ca    8.5      27 Jun 2018 20:00      CAPILLARY BLOOD GLUCOSE          RADIOLOGY & ADDITIONAL TESTS:      Consultant(s):    Care Discussed with Consultants/Other Providers [ ] YES  [ ] NO Patient is a 72y old  Female who presents with a chief complaint of Direct Admission - Transfer from [facility] for Periprosthetic R Femur Fracture (28 Jun 2018 19:22)      HPI:  resting in bed c/o knee pian and leg pain .  Denies sob or chest pain   Denies cough but has H/O copd . No nervous ness or Dyspnea at rest  Patient needs Incentive spirometry and needs to have pulmonary consult pre-opObtian abg      MEDICATIONS  (STANDING):  docusate sodium 100 milliGRAM(s) Oral three times a day  heparin  Injectable 5000 Unit(s) SubCutaneous every 8 hours  hydrALAZINE 50 milliGRAM(s) Oral every 8 hours  labetalol 200 milliGRAM(s) Oral three times a day  lactated ringers. 1000 milliLiter(s) (80 mL/Hr) IV Continuous <Continuous>  levoFLOXacin IVPB 250 milliGRAM(s) IV Intermittent every 24 hours  nicotine - 21 mG/24Hr(s) Patch 1 patch Transdermal daily  NIFEdipine XL 90 milliGRAM(s) Oral daily  pantoprazole    Tablet 40 milliGRAM(s) Oral before breakfast  QUEtiapine 150 milliGRAM(s) Oral at bedtime  senna 2 Tablet(s) Oral at bedtime  thiamine 100 milliGRAM(s) Oral daily  traMADol 50 milliGRAM(s) Oral every 8 hours    MEDICATIONS  (PRN):  acetaminophen   Tablet 650 milliGRAM(s) Oral every 6 hours PRN For Temp greater than 38 C (100.4 F)  acetaminophen   Tablet. 650 milliGRAM(s) Oral every 6 hours PRN Mild Pain (1 - 3) or HA  ALBUTerol/ipratropium for Nebulization 3 milliLiter(s) Nebulizer every 6 hours PRN Shortness of Breath and/or Wheezing  cyclobenzaprine 5 milliGRAM(s) Oral three times a day PRN Muscle Spasm  guaiFENesin   Syrup  (Sugar-Free) 200 milliGRAM(s) Oral every 6 hours PRN Cough  HYDROmorphone   Tablet 2 milliGRAM(s) Oral every 3 hours PRN Moderate Pain (4 - 6)  HYDROmorphone   Tablet 4 milliGRAM(s) Oral every 4 hours PRN Severe Pain (7 - 10)  HYDROmorphone  Injectable 1 milliGRAM(s) IV Push every 4 hours PRN Breakthrough Pain  ondansetron Injectable 4 milliGRAM(s) IV Push every 6 hours PRN Nausea and/or Vomiting  QUEtiapine 50 milliGRAM(s) Oral every 6 hours PRN Anxiety      Allergies    No Known Allergies    Intolerances        VITALS:   T(C): 37.1 (06-29-18 @ 12:07), Max: 37.3 (06-29-18 @ 05:08)  HR: 75 (06-29-18 @ 12:07) (66 - 97)  BP: 166/61 (06-29-18 @ 12:07) (125/92 - 195/74)  RR: 18 (06-29-18 @ 12:07) (17 - 20)  SpO2: 94% (06-29-18 @ 12:07) (94% - 100%)  Wt(kg): --    06-28 @ 07:01  -  06-29 @ 07:00  --------------------------------------------------------  IN: 240 mL / OUT: 1650 mL / NET: -1410 mL    06-29 @ 07:01  -  06-29 @ 12:24  --------------------------------------------------------  IN: 240 mL / OUT: 0 mL / NET: 240 mL        PHYSICAL EXAM:  GENERAL: NAD, well nourished and conversant  HEAD:  Atraumatic  EYES: EOM, PERRLA, conjunctiva pink and sclera white  ENT: No tonsillar erythema, exudates, or enlargement, moist mucous membranes, good dentition, no lesions  NECK: Supple, No JVD, normal thyroid, carotids with normal upstrokes and no bruits  CHEST/LUNG: Clear to auscultation bilaterally, severely diminished breath sounds    HEART: Regular rate and rhythm, No murmurs, rubs, or gallops  ABDOMEN: Soft, nondistended, no masses, guarding, tenderness or rebound, bowel sounds present  EXTREMITIES:  2+ Peripheral Pulses, No clubbing, cyanosis, or edema.   LYMPH: No lymphadenopathy noted  SKIN: No rashes or lesions  NERVOUS SYSTEM:  Alert & Oriented X3, normal cognitive function. Motor Strength 5/5 right upper and right lower.  5/5 left upper and left lower extremities, DTRs 2+ intact and symmetric    LABS:                          10.4   9.11  )-----------( 316      ( 29 Jun 2018 09:41 )             31.6     06-29    140  |  100  |  20  ----------------------------<  94  3.6   |  26  |  0.96  06-28    135  |  99  |  19  ----------------------------<  108<H>  3.6   |  24  |  0.84  06-27    134<L>  |  97<L>  |  24<H>  ----------------------------<  123<H>  4.1   |  23  |  0.99    Ca    8.5      29 Jun 2018 07:27  Ca    7.9<L>      28 Jun 2018 06:15  Ca    8.5      27 Jun 2018 20:00      CAPILLARY BLOOD GLUCOSE          RADIOLOGY & ADDITIONAL TESTS:      Consultant(s):    Care Discussed with Consultants/Other Providers [ ] YES  [ ] NO

## 2018-06-29 NOTE — PROGRESS NOTE ADULT - ASSESSMENT
Impression: Stable       Plan:   Continue present treatment                Preop for surgery 6/30, NPO after midnight                Meds note appreciated, pulm consult today                Continue to monitor    Ferny Mahoney PA-C  Orthopaedic Surgery  Team pager [phone]  [phone]

## 2018-06-29 NOTE — CHART NOTE - NSCHARTNOTEFT_GEN_A_CORE
Preop Dx:  Procedure:  Surgeon:     CBC Full  -  ( 2018 06:15 )  WBC Count : 8.95 K/uL  Hemoglobin : 7.1 g/dL  Hematocrit : 21.9 %  Platelet Count - Automated : 290 K/uL  Mean Cell Volume : 92.4 fL  Mean Cell Hemoglobin : 30.0 pg  Mean Cell Hemoglobin Concentration : 32.4 %  Auto Neutrophil # : x  Auto Lymphocyte # : x  Auto Monocyte # : x  Auto Eosinophil # : x  Auto Basophil # : x  Auto Neutrophil % : x  Auto Lymphocyte % : x  Auto Monocyte % : x  Auto Eosinophil % : x  Auto Basophil % : x          135  |  99  |  19  ----------------------------<  108<H>  3.6   |  24  |  0.84    Ca    7.9<L>      2018 06:15        PT/INR - ( 2018 06:15 )   PT: 13.5 SEC;   INR: 1.21          PTT - ( 2018 06:15 )  PTT:27.0 SEC    Urinalysis Basic - ( 2018 20:11 )    Color: PLYEL / Appearance: CLEAR / S.011 / pH: 5.5  Gluc: NEGATIVE / Ketone: NEGATIVE  / Bili: NEGATIVE / Urobili: NORMAL mg/dL   Blood: MODERATE / Protein: 30 mg/dL / Nitrite: NEGATIVE   Leuk Esterase: TRACE / RBC: 10-25 / WBC 2-5   Sq Epi: x / Non Sq Epi: x / Bacteria: x        ABO Interpretation: O (18 @ 08:44)      Urine BHCG:    CXR:    EKG:    Clearance:      -NPO after midnight/IVF while NPO  -Hold anticoag  -Analgesia  -Bedrest Preop Dx: R femur periprosthetic fracture  Procedure: ORIF  Surgeon: Haile    CBC Full  -  ( 2018 06:15 )  WBC Count : 8.95 K/uL  Hemoglobin : 7.1 g/dL  Hematocrit : 21.9 %  Platelet Count - Automated : 290 K/uL  Mean Cell Volume : 92.4 fL  Mean Cell Hemoglobin : 30.0 pg  Mean Cell Hemoglobin Concentration : 32.4 %  Auto Neutrophil # : x  Auto Lymphocyte # : x  Auto Monocyte # : x  Auto Eosinophil # : x  Auto Basophil # : x  Auto Neutrophil % : x  Auto Lymphocyte % : x  Auto Monocyte % : x  Auto Eosinophil % : x  Auto Basophil % : x          135  |  99  |  19  ----------------------------<  108<H>  3.6   |  24  |  0.84    Ca    7.9<L>      2018 06:15    PT/INR - ( 2018 06:15 )   PT: 13.5 SEC;   INR: 1.21       PTT - ( 2018 06:15 )  PTT:27.0 SEC    Urinalysis Basic - ( 2018 20:11 )    Color: PLYEL / Appearance: CLEAR / S.011 / pH: 5.5    < end of copied text >      Gluc: NEGATIVE / Ketone: NEGATIVE  / Bili: NEGATIVE / Urobili: NORMAL mg/dL   Blood: MODERATE / Protein: 30 mg/dL / Nitrite: NEGATIVE   Leuk Esterase: TRACE / RBC: 10-25 / WBC 2-5   Sq Epi: x / Non Sq Epi: x / Bacteria: x    ABO Interpretation: O (18 @ 08:44)    CXR:   < from: Xray Chest 1 View AP/PA (18 @ 21:43) >    IMPRESSION:  Patchy right basilar opacity again noted, of indeterminate   nature.    New small left retrocardiac opacity could be due to subsegmental   atelectasis although other etiologies are not excluded.    Correlation with CT scan of the chest could be performed for further   evaluation if felt to be clinically indicated.    EKG:< from: 12 Lead ECG (18 @ 19:32) >      Ventricular Rate 82 BPM    Atrial Rate 82 BPM    P-R Interval 126 ms    QRS Duration 76 ms    Q-T Interval 374 ms    QTC Calculation(Bezet) 436 ms    P Axis 78 degrees    R Axis 79 degrees    T Axis 32 degrees    Diagnosis Line Normal sinus rhythm  Possible Left atrial enlargement  Borderline ECG    < end of copied text >      Clearance:      -NPO after midnight/IVF while NPO  -Hold anticoag  -Analgesia  -Bedrest Preop Dx: R femur periprosthetic fracture  Procedure: ORIF  Surgeon: Haile    CBC Full  -  ( 2018 06:15 )  WBC Count : 8.95 K/uL  Hemoglobin : 7.1 g/dL  Hematocrit : 21.9 %  Platelet Count - Automated : 290 K/uL  Mean Cell Volume : 92.4 fL  Mean Cell Hemoglobin : 30.0 pg  Mean Cell Hemoglobin Concentration : 32.4 %  Auto Neutrophil # : x  Auto Lymphocyte # : x  Auto Monocyte # : x  Auto Eosinophil # : x  Auto Basophil # : x  Auto Neutrophil % : x  Auto Lymphocyte % : x  Auto Monocyte % : x  Auto Eosinophil % : x  Auto Basophil % : x          135  |  99  |  19  ----------------------------<  108<H>  3.6   |  24  |  0.84    Ca    7.9<L>      2018 06:15    PT/INR - ( 2018 06:15 )   PT: 13.5 SEC;   INR: 1.21       PTT - ( 2018 06:15 )  PTT:27.0 SEC    Urinalysis Basic - ( 2018 20:11 )    Color: PLYEL / Appearance: CLEAR / S.011 / pH: 5.5    < end of copied text >      Gluc: NEGATIVE / Ketone: NEGATIVE  / Bili: NEGATIVE / Urobili: NORMAL mg/dL   Blood: MODERATE / Protein: 30 mg/dL / Nitrite: NEGATIVE   Leuk Esterase: TRACE / RBC: 10-25 / WBC 2-5   Sq Epi: x / Non Sq Epi: x / Bacteria: x    ABO Interpretation: O (18 @ 08:44)    CXR:   < from: Xray Chest 1 View AP/PA (18 @ 21:43) >    IMPRESSION:  Patchy right basilar opacity again noted, of indeterminate   nature.    New small left retrocardiac opacity could be due to subsegmental   atelectasis although other etiologies are not excluded.    Correlation with CT scan of the chest could be performed for further   evaluation if felt to be clinically indicated.    EKG:< from: 12 Lead ECG (18 @ 19:32) >      Ventricular Rate 82 BPM    Atrial Rate 82 BPM    P-R Interval 126 ms    QRS Duration 76 ms    Q-T Interval 374 ms    QTC Calculation(Bezet) 436 ms    P Axis 78 degrees    R Axis 79 degrees    T Axis 32 degrees    Diagnosis Line Normal sinus rhythm  Possible Left atrial enlargement  Borderline ECG    < end of copied text >      Clearance: Pulmonary consulted for clearance, medicine clearance pending pulm.      -NPO after midnight/IVF while NPO  -Hold anticoag  -Analgesia  -Bedrest  -Chk am labs, transfuse if needed  -Follow up urine culture  -FU clearances  -Continue bonny Lara PA-C  #[id number]

## 2018-06-29 NOTE — PROGRESS NOTE ADULT - SUBJECTIVE AND OBJECTIVE BOX
ORTHO  Patient is a 72y old  Female who presents with a chief complaint of Direct Admission - Transfer from [facility] for Periprosthetic R Femur Fracture (28 Jun 2018 19:22)    Pt. resting without complaint    VS-  T(C): 37.3 (06-29-18 @ 05:08), Max: 37.3 (06-29-18 @ 05:08)  HR: 72 (06-29-18 @ 05:08) (66 - 97)  BP: 183/76 (06-29-18 @ 05:08) (125/92 - 195/74)  RR: 18 (06-29-18 @ 05:08) (16 - 20)  SpO2: 95% (06-29-18 @ 05:08) (94% - 100%)  Wt(kg): --    M.S. Alert  Extremity- Right LE- traction applied  Neuro-              Motor-(+)ankle DF/PF              Sensation- grossly intact to light touch              Calves- soft, nontender                             7.1    8.95  )-----------( 290      ( 28 Jun 2018 06:15 )             21.9     06-28    135  |  99  |  19  ----------------------------<  108<H>  3.6   |  24  |  0.84    Ca    7.9<L>      28 Jun 2018 06:15

## 2018-06-29 NOTE — PROGRESS NOTE ADULT - ASSESSMENT
Patient with severe copd with inability to function due to right periprosthetic femur fracture.  Medically stable but at increased risk for surgery in am.  Pulmonary to evaluate patient for or today.

## 2018-06-29 NOTE — CONSULT NOTE ADULT - SUBJECTIVE AND OBJECTIVE BOX
[facility] - [facility]  CONSULT NOTE    REASON FOR CONSULT: COPD    Asked to see this 72 year old female with a recent history of pneumonia and COPD, who is being evaluated by the orthopedic service follow an fall, sustaining a right periprosthetic femoral fracture.  She had initial be taken to [facility] after tripping over a garbage can and sustaining the injury.  She reportedly was told that she had a pneumonia on CxR (previous records are not available) and she has received 6 days of Levaquin.  She reportedly has improved significantly from the pulmonary perspective.        Pt is a 71 y/o female transferred from [facility] this evening s/p mechanical fall in her kitchen a few days ago.  Pt admits to tripping over a garbage can in her kitchen and falling.  She was taken by ambulance to [facility] and subsequently transferred to [facility].  Pt is a very poor historian and exact dates of the trip and fall and transfers are not readily available.  Pt now transferred to [facility] for definitive treatment of a R periprosthetic femur fracture.  Patient seen now resting comfortabl      PMHX:  Pain of right hip joint  Migraine  Alcohol abuse  Seizure  Stented coronary artery  Coronary artery disease  Anxiety  HTN (hypertension)  Emphysema, unspecified  Anxiety  Migraine  CAD (coronary artery disease)  Hyperlipidemia  Hypertension      PSHX:  Status post total hip replacement, right  No significant past surgical history  S/P bladder repair      FAMILY HISTORY:  Family history of coronary artery disease in daughter (Child)      SOCIAL HISTORY:    Pulmonary Medications:   ALBUTerol/ipratropium for Nebulization 3 milliLiter(s) Nebulizer every 6 hours PRN  guaiFENesin   Syrup  (Sugar-Free) 200 milliGRAM(s) Oral every 6 hours PRN      Antimicrobials:  levoFLOXacin IVPB 250 milliGRAM(s) IV Intermittent every 24 hours      Cardiology:  hydrALAZINE 50 milliGRAM(s) Oral every 8 hours  labetalol 200 milliGRAM(s) Oral three times a day  NIFEdipine XL 90 milliGRAM(s) Oral daily      Other:  acetaminophen   Tablet 650 milliGRAM(s) Oral every 6 hours PRN  acetaminophen   Tablet. 650 milliGRAM(s) Oral every 6 hours PRN  cyclobenzaprine 5 milliGRAM(s) Oral three times a day PRN  docusate sodium 100 milliGRAM(s) Oral three times a day  heparin  Injectable 5000 Unit(s) SubCutaneous every 8 hours  HYDROmorphone   Tablet 4 milliGRAM(s) Oral every 3 hours PRN  HYDROmorphone   Tablet 6 milliGRAM(s) Oral every 4 hours PRN  HYDROmorphone  Injectable 1 milliGRAM(s) IV Push every 4 hours PRN  lactated ringers. 1000 milliLiter(s) IV Continuous <Continuous>  nicotine - 21 mG/24Hr(s) Patch 1 patch Transdermal daily  ondansetron Injectable 4 milliGRAM(s) IV Push every 6 hours PRN  pantoprazole    Tablet 40 milliGRAM(s) Oral before breakfast  QUEtiapine 50 milliGRAM(s) Oral every 6 hours PRN  QUEtiapine 150 milliGRAM(s) Oral at bedtime  senna 2 Tablet(s) Oral at bedtime  thiamine 100 milliGRAM(s) Oral daily  traMADol 50 milliGRAM(s) Oral every 8 hours      Allergies    No Known Allergies    Intolerances        HOME MEDICATIONS:    REVIEW OF SYSTEMS:      Constitutional: Within normal limits  HEENT: Within normal limits  Neck: Within normal limits  Resp: No dyspnea at rest, dyspnea on exertion, chest congestion/wheeze, cough. stridor, sputum production, chest pain with respiration, hemoptysis  CV: No chest pain, chest pressure, chest discomfort, palpitations, lightheadedness/dizziness, syncope, lower extremity edema, inability to lay flat to sleep, awakening from sleep unable to sleep, claudication.  Extremities: Within normal limits  GI: Within normal limits  : Within normal limits   Psych/Neurological: Within normal limits  Musculoskeletal: Within normal limits  Hematologic: Within normal limits  Endocrinologic: Within normal limits   Skin: Within normal limits                                                                                                                                                                                [ ]Unable to obtain    OBJECTIVE:      ICU Vital Signs Last 24 Hrs  T(C): 36.7 (29 Jun 2018 16:12), Max: 37.3 (29 Jun 2018 05:08)  T(F): 98 (29 Jun 2018 16:12), Max: 99.1 (29 Jun 2018 05:08)  HR: 84 (29 Jun 2018 16:12) (71 - 88)  BP: 152/75 (29 Jun 2018 16:12) (150/73 - 187/79)  BP(mean): --  ABP: --  ABP(mean): --  RR: 18 (29 Jun 2018 16:12) (18 - 18)  SpO2: 95% (29 Jun 2018 16:12) (94% - 95%)      I&O's Detail    28 Jun 2018 07:01  -  29 Jun 2018 07:00  --------------------------------------------------------  IN:    Oral Fluid: 240 mL  Total IN: 240 mL    OUT:    Indwelling Catheter - Urethral: 1650 mL  Total OUT: 1650 mL    Total NET: -1410 mL      29 Jun 2018 07:01  -  29 Jun 2018 21:41  --------------------------------------------------------  IN:    Oral Fluid: 560 mL  Total IN: 560 mL    OUT:    Indwelling Catheter - Urethral: 1700 mL  Total OUT: 1700 mL    Total NET: -1140 mL        Daily Height in cm: 157.48 (28 Jun 2018 22:57)    Daily   CAPILLARY BLOOD GLUCOSE          PHYSICAL EXAM:  General: Awake, alert, cooperative, no distress, appears stated age   Head: Atraumatic, normocephalic  Eyes: Anicteric, conjunctiva/corneas clear, EOM's intact, PERRL, both eyes               Ears: External examination WNL, both ears                Nose: Nares normal, septum midline, mucosa normal, no drainage or sinus tenderness  Throat: Mallampati Grade:     No tonsillar or pharyngeal exudates. Lips, mucosa and tongue WNL; teeth and gums WNL  Neck: Supple, symmetric, trachea midline; thyroid without enlargement/tenderness/nodules; no carotid bruit; no JVD  Back: Symmetric, no curvature, range of motion normal, no CVA tenderness  Chest wall: No tenderness or deformity  Respiratory: Respirations unlabored; Clear to auscultation and percussion bilaterally  Cardiovascular: Regular rate and rhythm, S1 S2 normal. No murmurs, rubs or gallops.   Abdomen: Soft, non-tender, non-distended. No organomegaly. No masses. Normal bowel sounds  Extremities: Warm to touch. No clubbing or cyanosis. No pedal edema.  Pulses: 2+ peripheral pulses all extremities  Skin: Normal skin color, texture and turgor. No rashes or lesions  Lymph Nodes: Cervical, supraclavicular and axillary nodes normal  Neurological: Motor and sensory examination equal and normal. A and O x 3  Psychiatry: Appropriate mood and affect.    LABS:                            10.4   9.11  )-----------( 316      ( 29 Jun 2018 09:41 )             31.6     06-29    140  |  100  |  20  ----------------------------<  94  3.6   |  26  |  0.96    Ca    8.5      29 Jun 2018 07:27      PT/INR - ( 29 Jun 2018 09:46 )   PT: 13.2 sec;   INR: 1.16 ratio         PTT - ( 28 Jun 2018 06:15 )  PTT:27.0 SEC          MICROBIOLOGY:     RADIOLOGY:  [ ] Reviewed and interpreted by me    CXR:      CT Scan: [facility] - [facility]  CONSULT NOTE    REASON FOR CONSULT: COPD    Asked to see this 72 year old female with a recent history of pneumonia and COPD, who is being evaluated by the orthopedic service follow an fall, sustaining a right periprosthetic femoral fracture.  She had initial be taken to [facility] after tripping over a garbage can and sustaining the injury.  She reportedly was told that she had a pneumonia on CxR (previous records are not available) and she has received 6 days of Levaquin.  She reportedly has improved significantly from the pulmonary perspective.        Pt is a 73 y/o female transferred from [facility] this evening s/p mechanical fall in her kitchen a few days ago.  Pt admits to tripping over a garbage can in her kitchen and falling.  She was taken by ambulance to [facility] and subsequently transferred to [facility].  Pt is a very poor historian and exact dates of the trip and fall and transfers are not readily available.  Pt now transferred to [facility] for definitive treatment of a R periprosthetic femur fracture.  Patient seen now resting comfortabl      PMHX:  Pain of right hip joint  Migraine  Alcohol abuse  Seizure  Stented coronary artery  Coronary artery disease  Anxiety  HTN (hypertension)  Emphysema, unspecified  Anxiety  Migraine  CAD (coronary artery disease)  Hyperlipidemia  Hypertension      PSHX:  Status post total hip replacement, right  No significant past surgical history  S/P bladder repair      FAMILY HISTORY:  Family history of coronary artery disease in daughter (Child)      SOCIAL HISTORY:    Pulmonary Medications:   ALBUTerol/ipratropium for Nebulization 3 milliLiter(s) Nebulizer every 6 hours PRN  guaiFENesin   Syrup  (Sugar-Free) 200 milliGRAM(s) Oral every 6 hours PRN      Antimicrobials:  levoFLOXacin IVPB 250 milliGRAM(s) IV Intermittent every 24 hours      Cardiology:  hydrALAZINE 50 milliGRAM(s) Oral every 8 hours  labetalol 200 milliGRAM(s) Oral three times a day  NIFEdipine XL 90 milliGRAM(s) Oral daily      Other:  acetaminophen   Tablet 650 milliGRAM(s) Oral every 6 hours PRN  acetaminophen   Tablet. 650 milliGRAM(s) Oral every 6 hours PRN  cyclobenzaprine 5 milliGRAM(s) Oral three times a day PRN  docusate sodium 100 milliGRAM(s) Oral three times a day  heparin  Injectable 5000 Unit(s) SubCutaneous every 8 hours  HYDROmorphone   Tablet 4 milliGRAM(s) Oral every 3 hours PRN  HYDROmorphone   Tablet 6 milliGRAM(s) Oral every 4 hours PRN  HYDROmorphone  Injectable 1 milliGRAM(s) IV Push every 4 hours PRN  lactated ringers. 1000 milliLiter(s) IV Continuous <Continuous>  nicotine - 21 mG/24Hr(s) Patch 1 patch Transdermal daily  ondansetron Injectable 4 milliGRAM(s) IV Push every 6 hours PRN  pantoprazole    Tablet 40 milliGRAM(s) Oral before breakfast  QUEtiapine 50 milliGRAM(s) Oral every 6 hours PRN  QUEtiapine 150 milliGRAM(s) Oral at bedtime  senna 2 Tablet(s) Oral at bedtime  thiamine 100 milliGRAM(s) Oral daily  traMADol 50 milliGRAM(s) Oral every 8 hours      Allergies    No Known Allergies    Intolerances        HOME MEDICATIONS:    REVIEW OF SYSTEMS:      Constitutional: Within normal limits  HEENT: Within normal limits  Neck: Within normal limits  Resp: No dyspnea at rest, dyspnea on exertion, chest congestion/wheeze, cough. stridor, sputum production, chest pain with respiration, hemoptysis  CV: No chest pain, chest pressure, chest discomfort, palpitations, lightheadedness/dizziness, syncope, lower extremity edema, inability to lay flat to sleep, awakening from sleep unable to sleep, claudication.  Extremities: Within normal limits  GI: Within normal limits  : Within normal limits   Psych/Neurological: Within normal limits  Musculoskeletal: Within normal limits  Hematologic: Within normal limits  Endocrinologic: Within normal limits   Skin: Within normal limits                                                                                                                                                                                [ ]Unable to obtain    OBJECTIVE:      ICU Vital Signs Last 24 Hrs  T(C): 36.7 (29 Jun 2018 16:12), Max: 37.3 (29 Jun 2018 05:08)  T(F): 98 (29 Jun 2018 16:12), Max: 99.1 (29 Jun 2018 05:08)  HR: 84 (29 Jun 2018 16:12) (71 - 88)  BP: 152/75 (29 Jun 2018 16:12) (150/73 - 187/79)  BP(mean): --  ABP: --  ABP(mean): --  RR: 18 (29 Jun 2018 16:12) (18 - 18)  SpO2: 95% (29 Jun 2018 16:12) (94% - 95%)      I&O's Detail    28 Jun 2018 07:01  -  29 Jun 2018 07:00  --------------------------------------------------------  IN:    Oral Fluid: 240 mL  Total IN: 240 mL    OUT:    Indwelling Catheter - Urethral: 1650 mL  Total OUT: 1650 mL    Total NET: -1410 mL      29 Jun 2018 07:01  -  29 Jun 2018 21:41  --------------------------------------------------------  IN:    Oral Fluid: 560 mL  Total IN: 560 mL    OUT:    Indwelling Catheter - Urethral: 1700 mL  Total OUT: 1700 mL    Total NET: -1140 mL        Daily Height in cm: 157.48 (28 Jun 2018 22:57)    Daily   CAPILLARY BLOOD GLUCOSE          PHYSICAL EXAM:  General: Awake, alert, cooperative, no distress, appears stated age   Head: Atraumatic, normocephalic  Eyes: Anicteric, conjunctiva/corneas clear, EOM's intact, PERRL, both eyes               Ears: External examination WNL, both ears                Nose: Nares normal, septum midline, mucosa normal, no drainage or sinus tenderness  Throat: Mallampati Grade:     No tonsillar or pharyngeal exudates. Lips, mucosa and tongue WNL; teeth and gums WNL  Neck: Supple, symmetric, trachea midline; thyroid without enlargement/tenderness/nodules; no carotid bruit; no JVD  Back: Symmetric, no curvature, range of motion normal, no CVA tenderness  Chest wall: No tenderness or deformity  Respiratory: Respirations unlabored; Clear to auscultation and percussion bilaterally  Cardiovascular: Regular rate and rhythm, S1 S2 normal. No murmurs, rubs or gallops.   Abdomen: Soft, non-tender, non-distended. No organomegaly. No masses. Normal bowel sounds  Extremities: Warm to touch. No clubbing or cyanosis. No pedal edema.  Pulses: 2+ peripheral pulses all extremities  Skin: Normal skin color, texture and turgor. No rashes or lesions  Lymph Nodes: Cervical, supraclavicular and axillary nodes normal  Neurological: Motor and sensory examination equal and normal. A and O x 3  Psychiatry: Appropriate mood and affect.    LABS:                            10.4   9.11  )-----------( 316      ( 29 Jun 2018 09:41 )             31.6     06-29    140  |  100  |  20  ----------------------------<  94  3.6   |  26  |  0.96    Ca    8.5      29 Jun 2018 07:27      PT/INR - ( 29 Jun 2018 09:46 )   PT: 13.2 sec;   INR: 1.16 ratio         PTT - ( 28 Jun 2018 06:15 )  PTT:27.0 SEC          MICROBIOLOGY:     RADIOLOGY:  [ ] Reviewed and interpreted by me    CXR:  EXAM:  XR CHEST AP OR PA 1V                            PROCEDURE DATE:  06/28/2018            INTERPRETATION:  A single chest x-ray was obtained on June 28, 2018.    Indication: Preop.    Impression:    The heart is normal in size. Right lower lobepneumonia. Platelike   atelectasis left lower lobe. Degenerative changes of the thoracic spine.   Calcified aortic knob.                    CHRISTIAN ARREDONDO M.D., ATTENDING RADIOLOGIST  This document has been electronically signed. Jun 29 2018 10:29AM                  CT Scan: [facility] - [facility]  CONSULT NOTE    REASON FOR CONSULT: COPD    Asked to see this 72 year old female with a recent history of pneumonia and COPD, who is being evaluated by the orthopedic service follow an fall, sustaining a right periprosthetic femoral fracture.  She had initial be taken to [facility] after tripping over a garbage can and sustaining the injury.  She reportedly was told that she had a pneumonia on CxR (previous records are not available) and she has received 6 days of Levaquin.  She reportedly has improved significantly from the pulmonary perspective.  She has a chronic cough productive of clear sputum.  She has significant pain in the right leg which has been controlled pain medication.   She has a history of heavy Etoh use, but apparently has not had a alcoholic beverage in more than a week.  She is an active cigarette smoker.      PMHX:  Pain of right hip joint  Migraine  Alcohol abuse  Seizure  Stented coronary artery  Coronary artery disease  Anxiety  HTN (hypertension)  Emphysema, unspecified  Anxiety  Migraine  CAD (coronary artery disease)  Hyperlipidemia  Hypertension      PSHX:  Status post total hip replacement, right  No significant past surgical history  S/P bladder repair      FAMILY HISTORY:  Family history of coronary artery disease in daughter (Child)      SOCIAL HISTORY:  Active 1 ppd smoker  Etoh use (pt reported currently "social use-parties and weekends")    Pulmonary Medications:   ALBUTerol/ipratropium for Nebulization 3 milliLiter(s) Nebulizer every 6 hours PRN  guaiFENesin   Syrup  (Sugar-Free) 200 milliGRAM(s) Oral every 6 hours PRN      Antimicrobials:  levoFLOXacin IVPB 250 milliGRAM(s) IV Intermittent every 24 hours      Cardiology:  hydrALAZINE 50 milliGRAM(s) Oral every 8 hours  labetalol 200 milliGRAM(s) Oral three times a day  NIFEdipine XL 90 milliGRAM(s) Oral daily      Other:  acetaminophen   Tablet 650 milliGRAM(s) Oral every 6 hours PRN  acetaminophen   Tablet. 650 milliGRAM(s) Oral every 6 hours PRN  cyclobenzaprine 5 milliGRAM(s) Oral three times a day PRN  docusate sodium 100 milliGRAM(s) Oral three times a day  heparin  Injectable 5000 Unit(s) SubCutaneous every 8 hours  HYDROmorphone   Tablet 4 milliGRAM(s) Oral every 3 hours PRN  HYDROmorphone   Tablet 6 milliGRAM(s) Oral every 4 hours PRN  HYDROmorphone  Injectable 1 milliGRAM(s) IV Push every 4 hours PRN  lactated ringers. 1000 milliLiter(s) IV Continuous <Continuous>  nicotine - 21 mG/24Hr(s) Patch 1 patch Transdermal daily  ondansetron Injectable 4 milliGRAM(s) IV Push every 6 hours PRN  pantoprazole    Tablet 40 milliGRAM(s) Oral before breakfast  QUEtiapine 50 milliGRAM(s) Oral every 6 hours PRN  QUEtiapine 150 milliGRAM(s) Oral at bedtime  senna 2 Tablet(s) Oral at bedtime  thiamine 100 milliGRAM(s) Oral daily  traMADol 50 milliGRAM(s) Oral every 8 hours      Allergies    No Known Allergies    Intolerances        HOME MEDICATIONS:    REVIEW OF SYSTEMS:      Constitutional: Within normal limits  HEENT: Within normal limits  Neck: Within normal limits  Resp: Positive chronic cough with clear sputum production. No dyspnea at rest, dyspnea on exertion, chest pain with respiration, hemoptysis  CV: No chest pain, chest pressure, chest discomfort, palpitations, lightheadedness/dizziness, syncope, lower extremity edema, inability to lay flat to sleep, awakening from sleep unable to sleep, claudication.  Extremities: Within normal limits  GI: Within normal limits  : Within normal limits   Psych/Neurological: Within normal limits  Musculoskeletal: Right lower extremity pain post-hip fracture  Hematologic: Within normal limits  Endocrinologic: Within normal limits   Skin: Within normal limits                                                                                                                                                                                [ ]Unable to obtain    OBJECTIVE:      ICU Vital Signs Last 24 Hrs  T(C): 36.7 (29 Jun 2018 16:12), Max: 37.3 (29 Jun 2018 05:08)  T(F): 98 (29 Jun 2018 16:12), Max: 99.1 (29 Jun 2018 05:08)  HR: 84 (29 Jun 2018 16:12) (71 - 88)  BP: 152/75 (29 Jun 2018 16:12) (150/73 - 187/79)  BP(mean): --  ABP: --  ABP(mean): --  RR: 18 (29 Jun 2018 16:12) (18 - 18)  SpO2: 95% (29 Jun 2018 16:12) (94% - 95%)      I&O's Detail    28 Jun 2018 07:01  -  29 Jun 2018 07:00  --------------------------------------------------------  IN:    Oral Fluid: 240 mL  Total IN: 240 mL    OUT:    Indwelling Catheter - Urethral: 1650 mL  Total OUT: 1650 mL    Total NET: -1410 mL      29 Jun 2018 07:01  -  29 Jun 2018 21:41  --------------------------------------------------------  IN:    Oral Fluid: 560 mL  Total IN: 560 mL    OUT:    Indwelling Catheter - Urethral: 1700 mL  Total OUT: 1700 mL    Total NET: -1140 mL        Daily Height in cm: 157.48 (28 Jun 2018 22:57)    Daily   CAPILLARY BLOOD GLUCOSE          PHYSICAL EXAM:  General: Awake, alert, cooperative, no distress, appears stated age   Head: Atraumatic, normocephalic  Eyes: Anicteric, conjunctiva/corneas clear, EOM's intact, PERRL, both eyes               Ears: External examination WNL, both ears                Nose: Nares normal, septum midline, mucosa normal, no drainage or sinus tenderness  Throat: Mallampati Grade:     No tonsillar or pharyngeal exudates. Lips, mucosa and tongue WNL; teeth and gums WNL  Neck: Supple, symmetric, trachea midline; thyroid without enlargement/tenderness/nodules; no carotid bruit; no JVD  Back: Kyphosis  Chest wall: No tenderness or deformity  Respiratory: Prolonged expiratory phase course breath sounds with forced expiration  Cardiovascular: Regular rate and rhythm, S1 S2 normal. No murmurs, rubs or gallops.   Abdomen: Soft, non-tender, non-distended. No organomegaly. No masses. Normal bowel sounds  Extremities: post-right hip fracture, arthritic changes  Pulses: 2+ peripheral pulses all extremities  Skin: Normal skin color, texture and turgor. No rashes or lesions  Lymph Nodes: Cervical, supraclavicular and axillary nodes normal  Neurological: Motor and sensory examination equal and normal. A and O x 3  Psychiatry: Appropriate mood and affect.    LABS:                            10.4   9.11  )-----------( 316      ( 29 Jun 2018 09:41 )             31.6     06-29    140  |  100  |  20  ----------------------------<  94  3.6   |  26  |  0.96    Ca    8.5      29 Jun 2018 07:27      PT/INR - ( 29 Jun 2018 09:46 )   PT: 13.2 sec;   INR: 1.16 ratio         PTT - ( 28 Jun 2018 06:15 )  PTT:27.0 SEC          MICROBIOLOGY:     RADIOLOGY:  [ ] Reviewed and interpreted by me    CXR:  EXAM:  XR CHEST AP OR PA 1V                            PROCEDURE DATE:  06/28/2018            INTERPRETATION:  A single chest x-ray was obtained on June 28, 2018.    Indication: Preop.    Impression:    The heart is normal in size. Right lower lobepneumonia. Platelike   atelectasis left lower lobe. Degenerative changes of the thoracic spine.   Calcified aortic knob.                    CHRISTIAN ARREDONDO M.D., ATTENDING RADIOLOGIST  This document has been electronically signed. Jun 29 2018 10:29AM                  CT Scan:

## 2018-06-30 LAB
ANION GAP SERPL CALC-SCNC: 13 MMOL/L — SIGNIFICANT CHANGE UP (ref 5–17)
ANION GAP SERPL CALC-SCNC: 15 MMOL/L — SIGNIFICANT CHANGE UP (ref 5–17)
APTT BLD: 28.3 SEC — SIGNIFICANT CHANGE UP (ref 27.5–37.4)
BLD GP AB SCN SERPL QL: NEGATIVE — SIGNIFICANT CHANGE UP
BUN SERPL-MCNC: 18 MG/DL — SIGNIFICANT CHANGE UP (ref 7–23)
BUN SERPL-MCNC: 18 MG/DL — SIGNIFICANT CHANGE UP (ref 7–23)
CALCIUM SERPL-MCNC: 7.7 MG/DL — LOW (ref 8.4–10.5)
CALCIUM SERPL-MCNC: 8 MG/DL — LOW (ref 8.4–10.5)
CHLORIDE SERPL-SCNC: 98 MMOL/L — SIGNIFICANT CHANGE UP (ref 96–108)
CHLORIDE SERPL-SCNC: 98 MMOL/L — SIGNIFICANT CHANGE UP (ref 96–108)
CO2 SERPL-SCNC: 25 MMOL/L — SIGNIFICANT CHANGE UP (ref 22–31)
CO2 SERPL-SCNC: 28 MMOL/L — SIGNIFICANT CHANGE UP (ref 22–31)
CREAT SERPL-MCNC: 0.78 MG/DL — SIGNIFICANT CHANGE UP (ref 0.5–1.3)
CREAT SERPL-MCNC: 0.8 MG/DL — SIGNIFICANT CHANGE UP (ref 0.5–1.3)
CULTURE RESULTS: NO GROWTH — SIGNIFICANT CHANGE UP
GAS PNL BLDA: SIGNIFICANT CHANGE UP
GLUCOSE SERPL-MCNC: 101 MG/DL — HIGH (ref 70–99)
GLUCOSE SERPL-MCNC: 147 MG/DL — HIGH (ref 70–99)
HCT VFR BLD CALC: 31.1 % — LOW (ref 34.5–45)
HCT VFR BLD CALC: 34.7 % — SIGNIFICANT CHANGE UP (ref 34.5–45)
HGB BLD-MCNC: 10.5 G/DL — LOW (ref 11.5–15.5)
HGB BLD-MCNC: 11.2 G/DL — LOW (ref 11.5–15.5)
INR BLD: 1.25 RATIO — HIGH (ref 0.88–1.16)
MCHC RBC-ENTMCNC: 31 PG — SIGNIFICANT CHANGE UP (ref 27–34)
MCHC RBC-ENTMCNC: 32.3 GM/DL — SIGNIFICANT CHANGE UP (ref 32–36)
MCHC RBC-ENTMCNC: 33 PG — SIGNIFICANT CHANGE UP (ref 27–34)
MCHC RBC-ENTMCNC: 33.9 GM/DL — SIGNIFICANT CHANGE UP (ref 32–36)
MCV RBC AUTO: 96.1 FL — SIGNIFICANT CHANGE UP (ref 80–100)
MCV RBC AUTO: 97.4 FL — SIGNIFICANT CHANGE UP (ref 80–100)
PLATELET # BLD AUTO: 338 K/UL — SIGNIFICANT CHANGE UP (ref 150–400)
PLATELET # BLD AUTO: 350 K/UL — SIGNIFICANT CHANGE UP (ref 150–400)
POTASSIUM SERPL-MCNC: 3.3 MMOL/L — LOW (ref 3.5–5.3)
POTASSIUM SERPL-MCNC: 4.3 MMOL/L — SIGNIFICANT CHANGE UP (ref 3.5–5.3)
POTASSIUM SERPL-SCNC: 3.3 MMOL/L — LOW (ref 3.5–5.3)
POTASSIUM SERPL-SCNC: 4.3 MMOL/L — SIGNIFICANT CHANGE UP (ref 3.5–5.3)
PROTHROM AB SERPL-ACNC: 13.6 SEC — HIGH (ref 9.8–12.7)
RBC # BLD: 3.19 M/UL — LOW (ref 3.8–5.2)
RBC # BLD: 3.61 M/UL — LOW (ref 3.8–5.2)
RBC # FLD: 13.5 % — SIGNIFICANT CHANGE UP (ref 10.3–14.5)
RBC # FLD: 13.5 % — SIGNIFICANT CHANGE UP (ref 10.3–14.5)
RH IG SCN BLD-IMP: POSITIVE — SIGNIFICANT CHANGE UP
SODIUM SERPL-SCNC: 138 MMOL/L — SIGNIFICANT CHANGE UP (ref 135–145)
SODIUM SERPL-SCNC: 139 MMOL/L — SIGNIFICANT CHANGE UP (ref 135–145)
SPECIMEN SOURCE: SIGNIFICANT CHANGE UP
WBC # BLD: 10.1 K/UL — SIGNIFICANT CHANGE UP (ref 3.8–10.5)
WBC # BLD: 11.2 K/UL — HIGH (ref 3.8–10.5)
WBC # FLD AUTO: 10.1 K/UL — SIGNIFICANT CHANGE UP (ref 3.8–10.5)
WBC # FLD AUTO: 11.2 K/UL — HIGH (ref 3.8–10.5)

## 2018-06-30 PROCEDURE — 99223 1ST HOSP IP/OBS HIGH 75: CPT | Mod: 57,AI

## 2018-06-30 PROCEDURE — 27138 REVISE HIP JOINT REPLACEMENT: CPT | Mod: RT

## 2018-06-30 PROCEDURE — 27506 TREATMENT OF THIGH FRACTURE: CPT | Mod: RT

## 2018-06-30 PROCEDURE — 72170 X-RAY EXAM OF PELVIS: CPT | Mod: 26

## 2018-06-30 RX ORDER — KETOROLAC TROMETHAMINE 30 MG/ML
30 SYRINGE (ML) INJECTION EVERY 8 HOURS
Qty: 0 | Refills: 0 | Status: COMPLETED | OUTPATIENT
Start: 2018-06-30 | End: 2018-06-30

## 2018-06-30 RX ORDER — OXYCODONE HYDROCHLORIDE 5 MG/1
5 TABLET ORAL EVERY 4 HOURS
Qty: 0 | Refills: 0 | Status: DISCONTINUED | OUTPATIENT
Start: 2018-07-01 | End: 2018-07-01

## 2018-06-30 RX ORDER — SODIUM CHLORIDE 9 MG/ML
1000 INJECTION, SOLUTION INTRAVENOUS
Qty: 0 | Refills: 0 | Status: DISCONTINUED | OUTPATIENT
Start: 2018-06-30 | End: 2018-07-03

## 2018-06-30 RX ORDER — LABETALOL HCL 100 MG
200 TABLET ORAL THREE TIMES A DAY
Qty: 0 | Refills: 0 | Status: DISCONTINUED | OUTPATIENT
Start: 2018-06-30 | End: 2018-07-03

## 2018-06-30 RX ORDER — POLYETHYLENE GLYCOL 3350 17 G/17G
17 POWDER, FOR SOLUTION ORAL DAILY
Qty: 0 | Refills: 0 | Status: DISCONTINUED | OUTPATIENT
Start: 2018-06-30 | End: 2018-07-03

## 2018-06-30 RX ORDER — ACETAMINOPHEN 500 MG
1000 TABLET ORAL ONCE
Qty: 0 | Refills: 0 | Status: COMPLETED | OUTPATIENT
Start: 2018-07-01 | End: 2018-07-01

## 2018-06-30 RX ORDER — PANTOPRAZOLE SODIUM 20 MG/1
40 TABLET, DELAYED RELEASE ORAL DAILY
Qty: 0 | Refills: 0 | Status: DISCONTINUED | OUTPATIENT
Start: 2018-07-01 | End: 2018-07-03

## 2018-06-30 RX ORDER — CEFAZOLIN SODIUM 1 G
2000 VIAL (EA) INJECTION EVERY 8 HOURS
Qty: 0 | Refills: 0 | Status: COMPLETED | OUTPATIENT
Start: 2018-06-30 | End: 2018-07-01

## 2018-06-30 RX ORDER — SODIUM CHLORIDE 9 MG/ML
500 INJECTION INTRAMUSCULAR; INTRAVENOUS; SUBCUTANEOUS ONCE
Qty: 0 | Refills: 0 | Status: COMPLETED | OUTPATIENT
Start: 2018-06-30 | End: 2018-06-30

## 2018-06-30 RX ORDER — HYDRALAZINE HCL 50 MG
50 TABLET ORAL EVERY 8 HOURS
Qty: 0 | Refills: 0 | Status: DISCONTINUED | OUTPATIENT
Start: 2018-06-30 | End: 2018-07-03

## 2018-06-30 RX ORDER — SODIUM CHLORIDE 9 MG/ML
500 INJECTION INTRAMUSCULAR; INTRAVENOUS; SUBCUTANEOUS ONCE
Qty: 0 | Refills: 0 | Status: COMPLETED | OUTPATIENT
Start: 2018-06-30 | End: 2018-07-01

## 2018-06-30 RX ORDER — OXYCODONE HYDROCHLORIDE 5 MG/1
10 TABLET ORAL EVERY 4 HOURS
Qty: 0 | Refills: 0 | Status: DISCONTINUED | OUTPATIENT
Start: 2018-07-01 | End: 2018-07-01

## 2018-06-30 RX ORDER — THIAMINE MONONITRATE (VIT B1) 100 MG
100 TABLET ORAL DAILY
Qty: 0 | Refills: 0 | Status: DISCONTINUED | OUTPATIENT
Start: 2018-06-30 | End: 2018-07-03

## 2018-06-30 RX ORDER — HYDROMORPHONE HYDROCHLORIDE 2 MG/ML
0.5 INJECTION INTRAMUSCULAR; INTRAVENOUS; SUBCUTANEOUS EVERY 4 HOURS
Qty: 0 | Refills: 0 | Status: DISCONTINUED | OUTPATIENT
Start: 2018-07-01 | End: 2018-07-02

## 2018-06-30 RX ORDER — NIFEDIPINE 30 MG
90 TABLET, EXTENDED RELEASE 24 HR ORAL DAILY
Qty: 0 | Refills: 0 | Status: DISCONTINUED | OUTPATIENT
Start: 2018-06-30 | End: 2018-07-03

## 2018-06-30 RX ORDER — ONDANSETRON 8 MG/1
4 TABLET, FILM COATED ORAL EVERY 6 HOURS
Qty: 0 | Refills: 0 | Status: DISCONTINUED | OUTPATIENT
Start: 2018-06-30 | End: 2018-07-03

## 2018-06-30 RX ORDER — ASPIRIN/CALCIUM CARB/MAGNESIUM 324 MG
325 TABLET ORAL
Qty: 0 | Refills: 0 | Status: DISCONTINUED | OUTPATIENT
Start: 2018-06-30 | End: 2018-07-03

## 2018-06-30 RX ORDER — BUDESONIDE, MICRONIZED 100 %
0.5 POWDER (GRAM) MISCELLANEOUS
Qty: 0 | Refills: 0 | Status: DISCONTINUED | OUTPATIENT
Start: 2018-06-30 | End: 2018-07-03

## 2018-06-30 RX ORDER — CELECOXIB 200 MG/1
200 CAPSULE ORAL DAILY
Qty: 0 | Refills: 0 | Status: DISCONTINUED | OUTPATIENT
Start: 2018-07-01 | End: 2018-07-03

## 2018-06-30 RX ORDER — ACETAMINOPHEN 500 MG
1000 TABLET ORAL ONCE
Qty: 0 | Refills: 0 | Status: COMPLETED | OUTPATIENT
Start: 2018-06-30 | End: 2018-06-30

## 2018-06-30 RX ORDER — POTASSIUM CHLORIDE 20 MEQ
40 PACKET (EA) ORAL EVERY 4 HOURS
Qty: 0 | Refills: 0 | Status: DISCONTINUED | OUTPATIENT
Start: 2018-06-30 | End: 2018-06-30

## 2018-06-30 RX ORDER — HYDROMORPHONE HYDROCHLORIDE 2 MG/ML
0.5 INJECTION INTRAMUSCULAR; INTRAVENOUS; SUBCUTANEOUS
Qty: 0 | Refills: 0 | Status: DISCONTINUED | OUTPATIENT
Start: 2018-06-30 | End: 2018-06-30

## 2018-06-30 RX ORDER — QUETIAPINE FUMARATE 200 MG/1
150 TABLET, FILM COATED ORAL AT BEDTIME
Qty: 0 | Refills: 0 | Status: DISCONTINUED | OUTPATIENT
Start: 2018-06-30 | End: 2018-07-03

## 2018-06-30 RX ORDER — SENNA PLUS 8.6 MG/1
2 TABLET ORAL AT BEDTIME
Qty: 0 | Refills: 0 | Status: DISCONTINUED | OUTPATIENT
Start: 2018-06-30 | End: 2018-07-03

## 2018-06-30 RX ORDER — NICOTINE POLACRILEX 2 MG
1 GUM BUCCAL DAILY
Qty: 0 | Refills: 0 | Status: DISCONTINUED | OUTPATIENT
Start: 2018-06-30 | End: 2018-07-03

## 2018-06-30 RX ORDER — ONDANSETRON 8 MG/1
4 TABLET, FILM COATED ORAL ONCE
Qty: 0 | Refills: 0 | Status: DISCONTINUED | OUTPATIENT
Start: 2018-06-30 | End: 2018-06-30

## 2018-06-30 RX ORDER — MAGNESIUM HYDROXIDE 400 MG/1
30 TABLET, CHEWABLE ORAL DAILY
Qty: 0 | Refills: 0 | Status: DISCONTINUED | OUTPATIENT
Start: 2018-06-30 | End: 2018-07-03

## 2018-06-30 RX ORDER — DEXAMETHASONE 0.5 MG/5ML
8 ELIXIR ORAL ONCE
Qty: 0 | Refills: 0 | Status: COMPLETED | OUTPATIENT
Start: 2018-06-30 | End: 2018-07-01

## 2018-06-30 RX ORDER — TRAMADOL HYDROCHLORIDE 50 MG/1
50 TABLET ORAL EVERY 8 HOURS
Qty: 0 | Refills: 0 | Status: DISCONTINUED | OUTPATIENT
Start: 2018-07-01 | End: 2018-07-03

## 2018-06-30 RX ORDER — POTASSIUM CHLORIDE 20 MEQ
10 PACKET (EA) ORAL
Qty: 0 | Refills: 0 | Status: COMPLETED | OUTPATIENT
Start: 2018-06-30 | End: 2018-06-30

## 2018-06-30 RX ORDER — DOCUSATE SODIUM 100 MG
100 CAPSULE ORAL THREE TIMES A DAY
Qty: 0 | Refills: 0 | Status: DISCONTINUED | OUTPATIENT
Start: 2018-06-30 | End: 2018-07-03

## 2018-06-30 RX ORDER — QUETIAPINE FUMARATE 200 MG/1
50 TABLET, FILM COATED ORAL EVERY 6 HOURS
Qty: 0 | Refills: 0 | Status: DISCONTINUED | OUTPATIENT
Start: 2018-06-30 | End: 2018-07-03

## 2018-06-30 RX ADMIN — Medication 50 MILLIEQUIVALENT(S): at 09:25

## 2018-06-30 RX ADMIN — HYDROMORPHONE HYDROCHLORIDE 0.5 MILLIGRAM(S): 2 INJECTION INTRAMUSCULAR; INTRAVENOUS; SUBCUTANEOUS at 20:35

## 2018-06-30 RX ADMIN — HYDROMORPHONE HYDROCHLORIDE 0.5 MILLIGRAM(S): 2 INJECTION INTRAMUSCULAR; INTRAVENOUS; SUBCUTANEOUS at 20:50

## 2018-06-30 RX ADMIN — OXYCODONE HYDROCHLORIDE 10 MILLIGRAM(S): 5 TABLET ORAL at 22:25

## 2018-06-30 RX ADMIN — OXYCODONE HYDROCHLORIDE 10 MILLIGRAM(S): 5 TABLET ORAL at 21:55

## 2018-06-30 RX ADMIN — Medication 50 MILLIEQUIVALENT(S): at 08:53

## 2018-06-30 RX ADMIN — Medication 50 MILLIGRAM(S): at 06:10

## 2018-06-30 RX ADMIN — HYDROMORPHONE HYDROCHLORIDE 0.5 MILLIGRAM(S): 2 INJECTION INTRAMUSCULAR; INTRAVENOUS; SUBCUTANEOUS at 23:45

## 2018-06-30 RX ADMIN — HYDROMORPHONE HYDROCHLORIDE 1 MILLIGRAM(S): 2 INJECTION INTRAMUSCULAR; INTRAVENOUS; SUBCUTANEOUS at 07:54

## 2018-06-30 RX ADMIN — QUETIAPINE FUMARATE 150 MILLIGRAM(S): 200 TABLET, FILM COATED ORAL at 00:17

## 2018-06-30 RX ADMIN — Medication 400 MILLIGRAM(S): at 23:45

## 2018-06-30 RX ADMIN — Medication 90 MILLIGRAM(S): at 06:10

## 2018-06-30 RX ADMIN — HYDROMORPHONE HYDROCHLORIDE 6 MILLIGRAM(S): 2 INJECTION INTRAMUSCULAR; INTRAVENOUS; SUBCUTANEOUS at 10:04

## 2018-06-30 RX ADMIN — HYDROMORPHONE HYDROCHLORIDE 6 MILLIGRAM(S): 2 INJECTION INTRAMUSCULAR; INTRAVENOUS; SUBCUTANEOUS at 09:54

## 2018-06-30 RX ADMIN — SODIUM CHLORIDE 500 MILLILITER(S): 9 INJECTION INTRAMUSCULAR; INTRAVENOUS; SUBCUTANEOUS at 22:31

## 2018-06-30 RX ADMIN — PANTOPRAZOLE SODIUM 40 MILLIGRAM(S): 20 TABLET, DELAYED RELEASE ORAL at 06:10

## 2018-06-30 RX ADMIN — SODIUM CHLORIDE 100 MILLILITER(S): 9 INJECTION, SOLUTION INTRAVENOUS at 19:00

## 2018-06-30 RX ADMIN — QUETIAPINE FUMARATE 150 MILLIGRAM(S): 200 TABLET, FILM COATED ORAL at 21:54

## 2018-06-30 RX ADMIN — Medication 3 MILLILITER(S): at 07:56

## 2018-06-30 RX ADMIN — Medication 200 MILLIGRAM(S): at 06:10

## 2018-06-30 RX ADMIN — Medication 3 MILLILITER(S): at 00:16

## 2018-06-30 RX ADMIN — SODIUM CHLORIDE 500 MILLILITER(S): 9 INJECTION INTRAMUSCULAR; INTRAVENOUS; SUBCUTANEOUS at 19:00

## 2018-06-30 RX ADMIN — HYDROMORPHONE HYDROCHLORIDE 1 MILLIGRAM(S): 2 INJECTION INTRAMUSCULAR; INTRAVENOUS; SUBCUTANEOUS at 08:14

## 2018-06-30 NOTE — PROGRESS NOTE ADULT - SUBJECTIVE AND OBJECTIVE BOX
Follow-up Pulm Progress Note    The patient was seen and examined. Notes reviewed and discussed with staff/team as applicable      No new respiratory events overnight.      Denies: SOB, Chest pain, increased cough, colored phlegm, hemoptysis, N/V/D, neck stiffness, dysuria  ROS otherwise within normal limits    Vital Signs Last 24 Hrs  T(C): 37 (30 Jun 2018 11:16), Max: 37.6 (29 Jun 2018 21:46)  T(F): 98.6 (30 Jun 2018 11:16), Max: 99.7 (29 Jun 2018 21:46)  HR: 72 (30 Jun 2018 11:16) (69 - 88)  BP: 146/86 (30 Jun 2018 11:16) (122/59 - 176/79)  BP(mean): --  RR: 18 (30 Jun 2018 11:16) (18 - 18)  SpO2: 94% (30 Jun 2018 11:16) (92% - 95%)          06-29 @ 07:01  -  06-30 @ 07:00  --------------------------------------------------------  IN: 980 mL / OUT: 4700 mL / NET: -3720 mL          Medications:  MEDICATIONS  (STANDING):  ALBUTerol/ipratropium for Nebulization 3 milliLiter(s) Nebulizer every 6 hours  buDESOnide   0.5 milliGRAM(s) Respule 0.5 milliGRAM(s) Inhalation two times a day  docusate sodium 100 milliGRAM(s) Oral three times a day  hydrALAZINE 50 milliGRAM(s) Oral every 8 hours  labetalol 200 milliGRAM(s) Oral three times a day  lactated ringers. 1000 milliLiter(s) (80 mL/Hr) IV Continuous <Continuous>  levoFLOXacin IVPB 250 milliGRAM(s) IV Intermittent every 24 hours  nicotine - 21 mG/24Hr(s) Patch 1 patch Transdermal daily  NIFEdipine XL 90 milliGRAM(s) Oral daily  pantoprazole    Tablet 40 milliGRAM(s) Oral before breakfast  QUEtiapine 150 milliGRAM(s) Oral at bedtime  senna 2 Tablet(s) Oral at bedtime  thiamine 100 milliGRAM(s) Oral daily  traMADol 50 milliGRAM(s) Oral every 8 hours    MEDICATIONS  (PRN):  acetaminophen   Tablet 650 milliGRAM(s) Oral every 6 hours PRN For Temp greater than 38 C (100.4 F)  acetaminophen   Tablet. 650 milliGRAM(s) Oral every 6 hours PRN Mild Pain (1 - 3) or HA  cyclobenzaprine 5 milliGRAM(s) Oral three times a day PRN Muscle Spasm  guaiFENesin   Syrup  (Sugar-Free) 200 milliGRAM(s) Oral every 6 hours PRN Cough  HYDROmorphone   Tablet 4 milliGRAM(s) Oral every 3 hours PRN Moderate Pain (4 - 6)  HYDROmorphone   Tablet 6 milliGRAM(s) Oral every 4 hours PRN Severe Pain (7 - 10)  HYDROmorphone  Injectable 1 milliGRAM(s) IV Push every 4 hours PRN Breakthrough Pain  ondansetron Injectable 4 milliGRAM(s) IV Push every 6 hours PRN Nausea and/or Vomiting  QUEtiapine 50 milliGRAM(s) Oral every 6 hours PRN Anxiety      Allergies    No Known Allergies    Intolerances        Vent settings (if applicable)        Physical Examination:    Pleasant  Neck: no JVD, LAD, accessory muscle use  PULM: Clear to auscultation bilaterally, no wheezes, rales, rhonchi  CVS: Regular rate and rhythm, S1S2, no murmurs, rubs, or gallops  Abdomen:  Extremities:  Neuro:      LABS:                        11.2   10.1  )-----------( 338      ( 30 Jun 2018 05:02 )             34.7     06-30    139  |  98  |  18  ----------------------------<  101<H>  3.3<L>   |  28  |  0.78    Ca    8.0<L>      30 Jun 2018 05:02            CAPILLARY BLOOD GLUCOSE        PT/INR - ( 30 Jun 2018 05:02 )   PT: 13.6 sec;   INR: 1.25 ratio         PTT - ( 30 Jun 2018 05:02 )  PTT:28.3 sec          CULTURES:    Most recent blood culture   NO ORGANISMS ISOLATED  NO ORGANISMS ISOLATED AT 48 HRS. 06-28 @ 11:02   -- -- BLOOD 06-28 @ 11:02      RADIOLOGY REVIEWED    CXR:      CT chest:      Other:

## 2018-06-30 NOTE — BRIEF OPERATIVE NOTE - PROCEDURE
<<-----Click on this checkbox to enter Procedure ORIF, fracture, hip  06/30/2018  ORIF Rt periprosthetic femur fracture  Active  MVALLONE

## 2018-06-30 NOTE — PROGRESS NOTE ADULT - ASSESSMENT
71 y/o FM with periprosthetic R Femur Fracture for OR today, NPO, pain management  Kathya John PA-C  Orthopaedic Surgery  Team pager [phone]  [facility] [phone]  [phone]

## 2018-06-30 NOTE — PROGRESS NOTE ADULT - ASSESSMENT
Patient with severe copd with inability to function due to right periprosthetic femur fracture.  Medically stable but at increased risk for surgery in am.    Diagnosis:    Pre-Op Diagnosis:  Periprosth fracture around unsp internal prosth joint, init  06/30/2018    Active  Vee Cruz.     Post-Op Dx:  Periprosth fracture around unsp internal prosth joint, init  06/30/2018    Active  Vee Cruz.    Procedure:    Procedure:  ORIF, fracture, hip  06/30/2018  ORIF Rt periprosthetic femur fracture  Active  SUNDAY.       Operative Findings:  · Operative Findings	fracture around hip implant	    Specimens/Blood Loss/IV/Output/Protocol/VTE:    Specimens/Blood Loss/IV/Output/Protocol/VTE:  · Specimens	none	  · Estimated Blood Loss	400 milliLiter(s)	  · IV Infusions - Colloids	1500cc RL	  · Urine Output	400 milliLiter(s)	  · Antibiotic Protocol	Followed protocol	  · Venous Thromboembolism Prophylaxis Therapy	venodynes/post op ecotrin

## 2018-06-30 NOTE — PROGRESS NOTE ADULT - SUBJECTIVE AND OBJECTIVE BOX
Patient is a 72y old  Female who presents with a chief complaint of Direct Admission - Transfer from [facility] for Periprosthetic R Femur Fracture for OR today  Patient resting without complaints.  No chest pain, SOB, N/V.    T(C): 36.6 (06-30-18 @ 04:20), Max: 37.6 (06-29-18 @ 21:46)  HR: 72 (06-30-18 @ 06:07) (72 - 88)  BP: 137/70 (06-30-18 @ 06:07) (122/59 - 176/79)  RR: 18 (06-30-18 @ 06:07) (18 - 18)  SpO2: 94% (06-30-18 @ 06:07) (92% - 95%)    Exam:  Alert and Oriented, No Acute Distress  Cards: +S1/S2, RRR  Pulm: CTAB  Lower Extremities:  Calves Soft, Non-tender bilaterally  +PF/DF/EHL/FHL  SILT  +DP Pulse                       11.2   10.1  )-----------( 338      ( 30 Jun 2018 05:02 )             34.7    06-30  139  |  98  |  18  ----------------------------<  101<H>  3.3<L>   |  28  |  0.78  Ca    8.0<L>      30 Jun 2018 05:02

## 2018-06-30 NOTE — PROGRESS NOTE ADULT - SUBJECTIVE AND OBJECTIVE BOX
Patient is a 72y old  Female who presents with a chief complaint of Direct Admission - Transfer from [facility] for Periprosthetic R Femur Fracture (28 Jun 2018 19:22)      HPI:  Patient tolerated surgery with  prior Medical history of COPD.  Needs vigorous pulmonary toilet and Incentive spirometry  Diagnosis:    Pre-Op Diagnosis:  Periprosth fracture around unsp internal prosth joint, init  06/30/2018    Active  Vee Cruz.     Post-Op Dx:  Periprosth fracture around unsp internal prosth joint, init  06/30/2018    Active  Vee Cruz.    Procedure:    Procedure:  ORIF, fracture, hip  06/30/2018  ORIF Rt periprosthetic femur fracture  Active  SUNDAY.       Operative Findings:  · Operative Findings	fracture around hip implant	    Specimens/Blood Loss/IV/Output/Protocol/VTE:    Specimens/Blood Loss/IV/Output/Protocol/VTE:  · Specimens	none	  · Estimated Blood Loss	400 milliLiter(s)	  · IV Infusions - Colloids	1500cc RL	  · Urine Output	400 milliLiter(s)	  · Antibiotic Protocol	Followed protocol	  · Venous Thromboembolism Prophylaxis Therapy	venodynes/post op ecotrin	      MEDICATIONS  (STANDING):  acetaminophen  IVPB. 1000 milliGRAM(s) IV Intermittent once  aspirin enteric coated 325 milliGRAM(s) Oral two times a day  buDESOnide   0.5 milliGRAM(s) Respule 0.5 milliGRAM(s) Inhalation two times a day  ceFAZolin   IVPB 2000 milliGRAM(s) IV Intermittent every 8 hours  dexamethasone  IVPB 8 milliGRAM(s) IV Intermittent once  docusate sodium 100 milliGRAM(s) Oral three times a day  hydrALAZINE 50 milliGRAM(s) Oral every 8 hours  labetalol 200 milliGRAM(s) Oral three times a day  lactated ringers. 1000 milliLiter(s) (100 mL/Hr) IV Continuous <Continuous>  levoFLOXacin IVPB 250 milliGRAM(s) IV Intermittent every 24 hours  nicotine - 21 mG/24Hr(s) Patch 1 patch Transdermal daily  NIFEdipine XL 90 milliGRAM(s) Oral daily  polyethylene glycol 3350 17 Gram(s) Oral daily  QUEtiapine 150 milliGRAM(s) Oral at bedtime  sodium chloride 0.9% Bolus 500 milliLiter(s) IV Bolus once  sodium chloride 0.9% Bolus 500 milliLiter(s) IV Bolus once  thiamine 100 milliGRAM(s) Oral daily    MEDICATIONS  (PRN):  aluminum hydroxide/magnesium hydroxide/simethicone Suspension 30 milliLiter(s) Oral four times a day PRN Indigestion  guaiFENesin   Syrup  (Sugar-Free) 200 milliGRAM(s) Oral every 6 hours PRN Cough  HYDROmorphone  Injectable 0.5 milliGRAM(s) IV Push every 10 minutes PRN Moderate Pain  magnesium hydroxide Suspension 30 milliLiter(s) Oral daily PRN Constipation  ondansetron Injectable 4 milliGRAM(s) IV Push every 6 hours PRN Nausea and/or Vomiting  ondansetron Injectable 4 milliGRAM(s) IV Push once PRN Nausea and/or Vomiting  QUEtiapine 50 milliGRAM(s) Oral every 6 hours PRN Anxiety  senna 2 Tablet(s) Oral at bedtime PRN Constipation      Allergies    No Known Allergies    Intolerances        VITALS:   T(C): 36.7 (06-30-18 @ 21:42), Max: 37.6 (06-29-18 @ 21:46)  HR: 67 (06-30-18 @ 21:42) (55 - 81)  BP: 108/55 (06-30-18 @ 21:42) (100/55 - 176/79)  RR: 18 (06-30-18 @ 21:42) (14 - 18)  SpO2: 97% (06-30-18 @ 21:42) (92% - 99%)  Wt(kg): --    06-29 @ 07:01  -  06-30 @ 07:00  --------------------------------------------------------  IN: 980 mL / OUT: 4700 mL / NET: -3720 mL    06-30 @ 07:01  -  06-30 @ 21:43  --------------------------------------------------------  IN: 1000 mL / OUT: 225 mL / NET: 775 mL        PHYSICAL EXAM:  GENERAL: NAD, well nourished and conversant  HEAD:  Atraumatic  EYES: EOM, PERRLA, conjunctiva pink and sclera white  ENT: No tonsillar erythema, exudates, or enlargement, moist mucous membranes, good dentition, no lesions  NECK: Supple, No JVD, normal thyroid, carotids with normal upstrokes and no bruits  CHEST/LUNG: Clear to auscultation bilaterally, No rales, rhonchi, wheezing, or rubs  HEART: Regular rate and rhythm, No murmurs, rubs, or gallops  ABDOMEN: Soft, nondistended, no masses, guarding, tenderness or rebound, bowel sounds present  EXTREMITIES:  2+ Peripheral Pulses, No clubbing, cyanosis, or edema.  ORIF OF RIGHT PERIPROSTHETIC FEMUR FRACTURE  LYMPH: No lymphadenopathy noted  SKIN: No rashes or lesions  NERVOUS SYSTEM:  Alert & Oriented X3, normal cognitive function. Motor Strength 5/5 right upper and right lower.  5/5 left upper and left lower extremities, DTRs 2+ intact and symmetric    LABS:                          10.5   11.2  )-----------( 350      ( 30 Jun 2018 18:45 )             31.1     06-30    138  |  98  |  18  ----------------------------<  147<H>  4.3   |  25  |  0.80  06-30    139  |  98  |  18  ----------------------------<  101<H>  3.3<L>   |  28  |  0.78  06-29    140  |  100  |  20  ----------------------------<  94  3.6   |  26  |  0.96    Ca    7.7<L>      30 Jun 2018 18:45  Ca    8.0<L>      30 Jun 2018 05:02  Ca    8.5      29 Jun 2018 07:27      CAPILLARY BLOOD GLUCOSE          RADIOLOGY & ADDITIONAL TESTS:      Consultant(s):    Care Discussed with Consultants/Other Providers [ ] YES  [ ] NO

## 2018-07-01 LAB
ANION GAP SERPL CALC-SCNC: 11 MMOL/L — SIGNIFICANT CHANGE UP (ref 5–17)
BUN SERPL-MCNC: 21 MG/DL — SIGNIFICANT CHANGE UP (ref 7–23)
CALCIUM SERPL-MCNC: 7.7 MG/DL — LOW (ref 8.4–10.5)
CHLORIDE SERPL-SCNC: 98 MMOL/L — SIGNIFICANT CHANGE UP (ref 96–108)
CO2 SERPL-SCNC: 28 MMOL/L — SIGNIFICANT CHANGE UP (ref 22–31)
CREAT SERPL-MCNC: 0.9 MG/DL — SIGNIFICANT CHANGE UP (ref 0.5–1.3)
GLUCOSE SERPL-MCNC: 168 MG/DL — HIGH (ref 70–99)
HCT VFR BLD CALC: 27.8 % — LOW (ref 34.5–45)
HGB BLD-MCNC: 9 G/DL — LOW (ref 11.5–15.5)
MCHC RBC-ENTMCNC: 31.1 PG — SIGNIFICANT CHANGE UP (ref 27–34)
MCHC RBC-ENTMCNC: 32.4 GM/DL — SIGNIFICANT CHANGE UP (ref 32–36)
MCV RBC AUTO: 96.2 FL — SIGNIFICANT CHANGE UP (ref 80–100)
PLATELET # BLD AUTO: 345 K/UL — SIGNIFICANT CHANGE UP (ref 150–400)
POTASSIUM SERPL-MCNC: 4.3 MMOL/L — SIGNIFICANT CHANGE UP (ref 3.5–5.3)
POTASSIUM SERPL-SCNC: 4.3 MMOL/L — SIGNIFICANT CHANGE UP (ref 3.5–5.3)
RBC # BLD: 2.89 M/UL — LOW (ref 3.8–5.2)
RBC # FLD: 15.1 % — HIGH (ref 10.3–14.5)
SODIUM SERPL-SCNC: 137 MMOL/L — SIGNIFICANT CHANGE UP (ref 135–145)
WBC # BLD: 10.19 K/UL — SIGNIFICANT CHANGE UP (ref 3.8–10.5)
WBC # FLD AUTO: 10.19 K/UL — SIGNIFICANT CHANGE UP (ref 3.8–10.5)

## 2018-07-01 RX ORDER — HYDROMORPHONE HYDROCHLORIDE 2 MG/ML
4 INJECTION INTRAMUSCULAR; INTRAVENOUS; SUBCUTANEOUS
Qty: 0 | Refills: 0 | Status: DISCONTINUED | OUTPATIENT
Start: 2018-07-01 | End: 2018-07-02

## 2018-07-01 RX ADMIN — HYDROMORPHONE HYDROCHLORIDE 0.5 MILLIGRAM(S): 2 INJECTION INTRAMUSCULAR; INTRAVENOUS; SUBCUTANEOUS at 12:43

## 2018-07-01 RX ADMIN — Medication 100 MILLIGRAM(S): at 00:16

## 2018-07-01 RX ADMIN — HYDROMORPHONE HYDROCHLORIDE 0.5 MILLIGRAM(S): 2 INJECTION INTRAMUSCULAR; INTRAVENOUS; SUBCUTANEOUS at 09:57

## 2018-07-01 RX ADMIN — QUETIAPINE FUMARATE 150 MILLIGRAM(S): 200 TABLET, FILM COATED ORAL at 21:41

## 2018-07-01 RX ADMIN — HYDROMORPHONE HYDROCHLORIDE 4 MILLIGRAM(S): 2 INJECTION INTRAMUSCULAR; INTRAVENOUS; SUBCUTANEOUS at 19:18

## 2018-07-01 RX ADMIN — HYDROMORPHONE HYDROCHLORIDE 0.5 MILLIGRAM(S): 2 INJECTION INTRAMUSCULAR; INTRAVENOUS; SUBCUTANEOUS at 20:48

## 2018-07-01 RX ADMIN — Medication 400 MILLIGRAM(S): at 07:51

## 2018-07-01 RX ADMIN — Medication 1000 MILLIGRAM(S): at 09:10

## 2018-07-01 RX ADMIN — TRAMADOL HYDROCHLORIDE 50 MILLIGRAM(S): 50 TABLET ORAL at 22:10

## 2018-07-01 RX ADMIN — HYDROMORPHONE HYDROCHLORIDE 4 MILLIGRAM(S): 2 INJECTION INTRAMUSCULAR; INTRAVENOUS; SUBCUTANEOUS at 12:13

## 2018-07-01 RX ADMIN — HYDROMORPHONE HYDROCHLORIDE 4 MILLIGRAM(S): 2 INJECTION INTRAMUSCULAR; INTRAVENOUS; SUBCUTANEOUS at 08:20

## 2018-07-01 RX ADMIN — HYDROMORPHONE HYDROCHLORIDE 0.5 MILLIGRAM(S): 2 INJECTION INTRAMUSCULAR; INTRAVENOUS; SUBCUTANEOUS at 20:33

## 2018-07-01 RX ADMIN — HYDROMORPHONE HYDROCHLORIDE 0.5 MILLIGRAM(S): 2 INJECTION INTRAMUSCULAR; INTRAVENOUS; SUBCUTANEOUS at 09:41

## 2018-07-01 RX ADMIN — Medication 325 MILLIGRAM(S): at 05:45

## 2018-07-01 RX ADMIN — Medication 1000 MILLIGRAM(S): at 08:20

## 2018-07-01 RX ADMIN — TRAMADOL HYDROCHLORIDE 50 MILLIGRAM(S): 50 TABLET ORAL at 05:45

## 2018-07-01 RX ADMIN — HYDROMORPHONE HYDROCHLORIDE 4 MILLIGRAM(S): 2 INJECTION INTRAMUSCULAR; INTRAVENOUS; SUBCUTANEOUS at 08:00

## 2018-07-01 RX ADMIN — HYDROMORPHONE HYDROCHLORIDE 0.5 MILLIGRAM(S): 2 INJECTION INTRAMUSCULAR; INTRAVENOUS; SUBCUTANEOUS at 05:45

## 2018-07-01 RX ADMIN — Medication 325 MILLIGRAM(S): at 17:30

## 2018-07-01 RX ADMIN — Medication 1000 MILLIGRAM(S): at 00:15

## 2018-07-01 RX ADMIN — TRAMADOL HYDROCHLORIDE 50 MILLIGRAM(S): 50 TABLET ORAL at 21:41

## 2018-07-01 RX ADMIN — HYDROMORPHONE HYDROCHLORIDE 0.5 MILLIGRAM(S): 2 INJECTION INTRAMUSCULAR; INTRAVENOUS; SUBCUTANEOUS at 15:58

## 2018-07-01 RX ADMIN — HYDROMORPHONE HYDROCHLORIDE 0.5 MILLIGRAM(S): 2 INJECTION INTRAMUSCULAR; INTRAVENOUS; SUBCUTANEOUS at 13:15

## 2018-07-01 RX ADMIN — TRAMADOL HYDROCHLORIDE 50 MILLIGRAM(S): 50 TABLET ORAL at 06:15

## 2018-07-01 RX ADMIN — HYDROMORPHONE HYDROCHLORIDE 0.5 MILLIGRAM(S): 2 INJECTION INTRAMUSCULAR; INTRAVENOUS; SUBCUTANEOUS at 00:15

## 2018-07-01 RX ADMIN — Medication 50 MILLIGRAM(S): at 21:41

## 2018-07-01 RX ADMIN — TRAMADOL HYDROCHLORIDE 50 MILLIGRAM(S): 50 TABLET ORAL at 14:12

## 2018-07-01 RX ADMIN — CELECOXIB 200 MILLIGRAM(S): 200 CAPSULE ORAL at 15:02

## 2018-07-01 RX ADMIN — Medication 200 MILLIGRAM(S): at 14:12

## 2018-07-01 RX ADMIN — HYDROMORPHONE HYDROCHLORIDE 0.5 MILLIGRAM(S): 2 INJECTION INTRAMUSCULAR; INTRAVENOUS; SUBCUTANEOUS at 16:32

## 2018-07-01 RX ADMIN — CELECOXIB 200 MILLIGRAM(S): 200 CAPSULE ORAL at 14:23

## 2018-07-01 RX ADMIN — TRAMADOL HYDROCHLORIDE 50 MILLIGRAM(S): 50 TABLET ORAL at 15:02

## 2018-07-01 RX ADMIN — HYDROMORPHONE HYDROCHLORIDE 0.5 MILLIGRAM(S): 2 INJECTION INTRAMUSCULAR; INTRAVENOUS; SUBCUTANEOUS at 06:00

## 2018-07-01 RX ADMIN — Medication 0.5 MILLIGRAM(S): at 18:27

## 2018-07-01 RX ADMIN — Medication 100 MILLIGRAM(S): at 07:51

## 2018-07-01 RX ADMIN — HYDROMORPHONE HYDROCHLORIDE 4 MILLIGRAM(S): 2 INJECTION INTRAMUSCULAR; INTRAVENOUS; SUBCUTANEOUS at 19:48

## 2018-07-01 RX ADMIN — Medication 1 PATCH: at 14:22

## 2018-07-01 RX ADMIN — PANTOPRAZOLE SODIUM 40 MILLIGRAM(S): 20 TABLET, DELAYED RELEASE ORAL at 14:23

## 2018-07-01 RX ADMIN — Medication 200 MILLIGRAM(S): at 21:41

## 2018-07-01 RX ADMIN — Medication 101.6 MILLIGRAM(S): at 07:10

## 2018-07-01 RX ADMIN — Medication 400 MILLIGRAM(S): at 16:17

## 2018-07-01 RX ADMIN — OXYCODONE HYDROCHLORIDE 10 MILLIGRAM(S): 5 TABLET ORAL at 04:28

## 2018-07-01 RX ADMIN — OXYCODONE HYDROCHLORIDE 10 MILLIGRAM(S): 5 TABLET ORAL at 04:58

## 2018-07-01 RX ADMIN — SODIUM CHLORIDE 500 MILLILITER(S): 9 INJECTION INTRAMUSCULAR; INTRAVENOUS; SUBCUTANEOUS at 06:31

## 2018-07-01 RX ADMIN — Medication 100 MILLIGRAM(S): at 14:23

## 2018-07-01 RX ADMIN — Medication 0.5 MILLIGRAM(S): at 05:52

## 2018-07-01 RX ADMIN — HYDROMORPHONE HYDROCHLORIDE 4 MILLIGRAM(S): 2 INJECTION INTRAMUSCULAR; INTRAVENOUS; SUBCUTANEOUS at 12:33

## 2018-07-01 RX ADMIN — Medication 50 MILLIGRAM(S): at 14:12

## 2018-07-01 NOTE — PROGRESS NOTE ADULT - ASSESSMENT
Imp: The patient has a history of COPD, recent pneumonia, she is an active smoker, and she has a history of apparent heavy Etoh use (last drink reportedly more than a week ago).   She is increased risk for pulmonary complications in the perioperative period, but there is no absolute contraindication to surgery from our perspective, however she appears to be stable from our standpoint currently.    Plan:  Continue antibiotics  Supplemental O2 to maintain saturation 92% or above watching for evidence of CO2 retention.  Incentive spirometry.  Duoneb q6 h with budesonide via neb every 12 hours  Guaifenesin dm  Encouraging secretion clearance, pulmonary toilet prn.  Elevate HOB.  Aspiration precaution  Judicious CV/fluid management  Standard prophylactic measures  Follow up CxR  Judicious pain control  Routine post-operative care.    Discussed with the [address] [place] staff.

## 2018-07-01 NOTE — PHYSICAL THERAPY INITIAL EVALUATION ADULT - ADDITIONAL COMMENTS
As per pt, after d/c from hospital was in [facility] for 1 week. PMH  CAD s/p multiple stents, HTN, HLD, EtOH abuse, and anxiety. Home situation/prior level of function- Pt lives with  in private house. As per pt,  had an aide at home, but now he is in the hospital. Pt has a rolling walker. States was not using a walker prior to admit. 1 flight to bedroom, there is a chair lift, pt states does not use it.

## 2018-07-01 NOTE — PROGRESS NOTE ADULT - ASSESSMENT
Assessment: s/p Open Reduction Internal Fixation / Surgical Repair R kika-prosthetic Fx    Plan:  Pt apparently requires and has a high tolerance for larger doses of narcotics -> Rx adjusted  I-Stop now in chart  PT:  WBAT  DVt proph: Ectron BId  TOV:  delvis andino'd  Monitor V/S and sensorium Assessment: s/p Open Reduction Internal Fixation / Surgical Repair R kika-prosthetic Fx    Plan:  Pt apparently requires and has a high tolerance for larger doses of narcotics -> Rx adjusted  I-Stop now in chart  PT:  WBAT: posterior precautions  DVt proph: Ectron BId  TOV:  delvis andino'd  Monitor V/S and sensorium Assessment: s/p Open Reduction Internal Fixation / Surgical Repair R kika-prosthetic Fx    Plan:  Pt apparently requires and has a high tolerance for larger doses of narcotics -> Rx adjusted  I-Stop now in chart  PT:  WBAT: posterior precautions  DVt proph: Ectron BId  TOV:  delvis andino'bárbara  Pulm and med notes appreciated: cont IV Abx till 7/2  Monitor V/S and sensorium

## 2018-07-01 NOTE — PROGRESS NOTE ADULT - SUBJECTIVE AND OBJECTIVE BOX
Patient is a 72y old  Female who presents with a chief complaint of Direct Admission - Transfer from [facility] for Periprosthetic R Femur Fracture (28 Jun 2018 19:22)      HPI:    MEDICATIONS  (STANDING):  acetaminophen  IVPB. 1000 milliGRAM(s) IV Intermittent once  aspirin enteric coated 325 milliGRAM(s) Oral two times a day  buDESOnide   0.5 milliGRAM(s) Respule 0.5 milliGRAM(s) Inhalation two times a day  celecoxib 200 milliGRAM(s) Oral daily  docusate sodium 100 milliGRAM(s) Oral three times a day  hydrALAZINE 50 milliGRAM(s) Oral every 8 hours  labetalol 200 milliGRAM(s) Oral three times a day  lactated ringers. 1000 milliLiter(s) (100 mL/Hr) IV Continuous <Continuous>  levoFLOXacin IVPB 250 milliGRAM(s) IV Intermittent every 24 hours  nicotine - 21 mG/24Hr(s) Patch 1 patch Transdermal daily  NIFEdipine XL 90 milliGRAM(s) Oral daily  pantoprazole    Tablet 40 milliGRAM(s) Oral daily  polyethylene glycol 3350 17 Gram(s) Oral daily  QUEtiapine 150 milliGRAM(s) Oral at bedtime  thiamine 100 milliGRAM(s) Oral daily  traMADol 50 milliGRAM(s) Oral every 8 hours    MEDICATIONS  (PRN):  aluminum hydroxide/magnesium hydroxide/simethicone Suspension 30 milliLiter(s) Oral four times a day PRN Indigestion  guaiFENesin   Syrup  (Sugar-Free) 200 milliGRAM(s) Oral every 6 hours PRN Cough  HYDROmorphone   Tablet 4 milliGRAM(s) Oral every 3 hours PRN Severe Pain (7 - 10)  HYDROmorphone  Injectable 0.5 milliGRAM(s) IV Push every 4 hours PRN break through pain  magnesium hydroxide Suspension 30 milliLiter(s) Oral daily PRN Constipation  ondansetron Injectable 4 milliGRAM(s) IV Push every 6 hours PRN Nausea and/or Vomiting  QUEtiapine 50 milliGRAM(s) Oral every 6 hours PRN Anxiety  senna 2 Tablet(s) Oral at bedtime PRN Constipation      Allergies    No Known Allergies    Intolerances        REVIEW OF SYSTEM:  CONSTITUTIONAL: No fever, No change in weight, No fatigue  HEAD: No headache, No dizziness, No recent trauma  EYES: No eye pain, No visual disturbances, No discharge  ENT:  No difficulty hearing, No tinnitus, No vertigo, No sinus pain, No throat pain  NECK: No pain, No stiffness  BREASTS: No pain, No masses, No nipple discharge  RESPIRATORY: No cough, No wheezing, No chills, No hemoptysis, No shortness of breath at rest or exertional shortness of breath  CARDIOVASCULAR: No chest pain, No palpitations, No dizziness, No CHF, No arrhythmia, No cardiomegaly, No leg swelling  GASTROINTESTINAL: No abdominal, No epigastric pain. No nausea, No vomiting, No hematemesis, No diarrhea, No constipation. No melena, No hematochezia. No GERD  GENITOURINARY: No dysuria, No frequency, No hematuria, No incontinence, No nocturia, No hesitancy,  SKIN: No itching, No burning, No rashes, No lesions   LYMPH NODES: No history of enlarged glands  ENDOCRINE: No heat or cold intolerance, No hair loss. No osteoporosis, No thyroid disease  MUSCULOSKELETAL: No joint pain or swelling, No muscle, back, or extremity pain  PSYCHIATRIC: No depression, No anxiety, No mood swings, No difficulty sleeping  HEME/LYMPH: No easy bruising, No anticoagulants, No bleeding disorder, No bleeding gums  ALLERGY AND IMMUNOLOGIC: No hives, No eczema  NEUROLOGICAL: No memory loss, No loss of strength, No numbness, No tremors        VITALS:   T(C): 36.7 (07-01-18 @ 13:16), Max: 36.9 (07-01-18 @ 09:02)  HR: 81 (07-01-18 @ 13:16) (55 - 81)  BP: 138/70 (07-01-18 @ 13:16) (95/55 - 138/70)  RR: 18 (07-01-18 @ 13:16) (14 - 18)  SpO2: 94% (07-01-18 @ 13:16) (92% - 99%)  Wt(kg): --    06-30 @ 07:01  -  07-01 @ 07:00  --------------------------------------------------------  IN: 1000 mL / OUT: 700 mL / NET: 300 mL    07-01 @ 07:01  - 07-01 @ 13:27  --------------------------------------------------------  IN: 420 mL / OUT: 0 mL / NET: 420 mL        PHYSICAL EXAM:  GENERAL: NAD, well nourished and conversant  HEAD:  Atraumatic  EYES: EOM, PERRLA, conjunctiva pink and sclera white  ENT: No tonsillar erythema, exudates, or enlargement, moist mucous membranes, good dentition, no lesions  NECK: Supple, No JVD, normal thyroid, carotids with normal upstrokes and no bruits  CHEST/LUNG: Clear to auscultation bilaterally, No rales, rhonchi, wheezing, or rubs  HEART: Regular rate and rhythm, No murmurs, rubs, or gallops  ABDOMEN: Soft, nondistended, no masses, guarding, tenderness or rebound, bowel sounds present  EXTREMITIES:  2+ Peripheral Pulses, No clubbing, cyanosis, or edema. No arthritis of shoulders, elbows, hands, hips, knees, ankles, or feet. No DJD C spine, T spine, or L/S spine  LYMPH: No lymphadenopathy noted  SKIN: No rashes or lesions  NERVOUS SYSTEM:  Alert & Oriented X3, normal cognitive function. Motor Strength 5/5 right upper and right lower.  5/5 left upper and left lower extremities, DTRs 2+ intact and symmetric    LABS:                          9.0    10.19 )-----------( 345      ( 01 Jul 2018 08:34 )             27.8     07-01    137  |  98  |  21  ----------------------------<  168<H>  4.3   |  28  |  0.90  06-30    138  |  98  |  18  ----------------------------<  147<H>  4.3   |  25  |  0.80  06-30    139  |  98  |  18  ----------------------------<  101<H>  3.3<L>   |  28  |  0.78    Ca    7.7<L>      01 Jul 2018 06:39  Ca    7.7<L>      30 Jun 2018 18:45  Ca    8.0<L>      30 Jun 2018 05:02      CAPILLARY BLOOD GLUCOSE          RADIOLOGY & ADDITIONAL TESTS:      Consultant(s):    Care Discussed with Consultants/Other Providers [ ] YES  [ ] NO Patient is a 72y old  Female who presents with a chief complaint of Direct Admission - Transfer from [facility] for Periprosthetic R Femur Fracture (28 Jun 2018 19:22)      HPI:  Patine with severe COPD seen post op sitting in chair no sob no agitation/tachycardia  or alcohol withdrawal.  Needs to do vigorous deep breathing exercises.  Follow post op cbc and BMP    MEDICATIONS  (STANDING):  acetaminophen  IVPB. 1000 milliGRAM(s) IV Intermittent once  aspirin enteric coated 325 milliGRAM(s) Oral two times a day  buDESOnide   0.5 milliGRAM(s) Respule 0.5 milliGRAM(s) Inhalation two times a day  celecoxib 200 milliGRAM(s) Oral daily  docusate sodium 100 milliGRAM(s) Oral three times a day  hydrALAZINE 50 milliGRAM(s) Oral every 8 hours  labetalol 200 milliGRAM(s) Oral three times a day  lactated ringers. 1000 milliLiter(s) (100 mL/Hr) IV Continuous <Continuous>  levoFLOXacin IVPB 250 milliGRAM(s) IV Intermittent every 24 hours  nicotine - 21 mG/24Hr(s) Patch 1 patch Transdermal daily  NIFEdipine XL 90 milliGRAM(s) Oral daily  pantoprazole    Tablet 40 milliGRAM(s) Oral daily  polyethylene glycol 3350 17 Gram(s) Oral daily  QUEtiapine 150 milliGRAM(s) Oral at bedtime  thiamine 100 milliGRAM(s) Oral daily  traMADol 50 milliGRAM(s) Oral every 8 hours    MEDICATIONS  (PRN):  aluminum hydroxide/magnesium hydroxide/simethicone Suspension 30 milliLiter(s) Oral four times a day PRN Indigestion  guaiFENesin   Syrup  (Sugar-Free) 200 milliGRAM(s) Oral every 6 hours PRN Cough  HYDROmorphone   Tablet 4 milliGRAM(s) Oral every 3 hours PRN Severe Pain (7 - 10)  HYDROmorphone  Injectable 0.5 milliGRAM(s) IV Push every 4 hours PRN break through pain  magnesium hydroxide Suspension 30 milliLiter(s) Oral daily PRN Constipation  ondansetron Injectable 4 milliGRAM(s) IV Push every 6 hours PRN Nausea and/or Vomiting  QUEtiapine 50 milliGRAM(s) Oral every 6 hours PRN Anxiety  senna 2 Tablet(s) Oral at bedtime PRN Constipation      Allergies    No Known Allergies    Intolerances      VITALS:   T(C): 36.7 (07-01-18 @ 13:16), Max: 36.9 (07-01-18 @ 09:02)  HR: 81 (07-01-18 @ 13:16) (55 - 81)  BP: 138/70 (07-01-18 @ 13:16) (95/55 - 138/70)  RR: 18 (07-01-18 @ 13:16) (14 - 18)  SpO2: 94% (07-01-18 @ 13:16) (92% - 99%)  Wt(kg): --    06-30 @ 07:01  -  07-01 @ 07:00  --------------------------------------------------------  IN: 1000 mL / OUT: 700 mL / NET: 300 mL    07-01 @ 07:01  -  07-01 @ 13:27  --------------------------------------------------------  IN: 420 mL / OUT: 0 mL / NET: 420 mL        PHYSICAL EXAM:  GENERAL: NAD, well nourished and conversant  HEAD:  Atraumatic  EYES: EOM, PERRLA, conjunctiva pink and sclera white  ENT: No tonsillar erythema, exudates, or enlargement, moist mucous membranes, good dentition, no lesions  NECK: Supple, No JVD, normal thyroid, carotids with normal upstrokes and no bruits  CHEST/LUNG: Clear to auscultation bilaterally, No rales, rhonchi, wheezing, or rubs  HEART: Regular rate and rhythm, No murmurs, rubs, or gallops  ABDOMEN: Soft, nondistended, no masses, guarding, tenderness or rebound, bowel sounds present  EXTREMITIES:  2+ Peripheral Pulses, No clubbing, cyanosis, or edema.   s/p repair of periprosthetic femur fracture  LYMPH: No lymphadenopathy noted  SKIN: No rashes or lesions  NERVOUS SYSTEM:  Alert & Oriented X3, normal cognitive function. Motor Strength 5/5 right upper and right lower.  5/5 left upper and left lower extremities, DTRs 2+ intact and symmetric    LABS:                          9.0    10.19 )-----------( 345      ( 01 Jul 2018 08:34 )             27.8     07-01    137  |  98  |  21  ----------------------------<  168<H>  4.3   |  28  |  0.90  06-30    138  |  98  |  18  ----------------------------<  147<H>  4.3   |  25  |  0.80  06-30    139  |  98  |  18  ----------------------------<  101<H>  3.3<L>   |  28  |  0.78    Ca    7.7<L>      01 Jul 2018 06:39  Ca    7.7<L>      30 Jun 2018 18:45  Ca    8.0<L>      30 Jun 2018 05:02      CAPILLARY BLOOD GLUCOSE          RADIOLOGY & ADDITIONAL TESTS:      Consultant(s):    Care Discussed with Consultants/Other Providers [ ] YES  [ ] NO

## 2018-07-01 NOTE — PROGRESS NOTE ADULT - SUBJECTIVE AND OBJECTIVE BOX
Patient is a 72y old  Female who presents with a chief complaint of Direct Admission - Transfer from [facility] for Periprosthetic R Femur Fracture (28 Jun 2018 19:22)      POST OPERATIVE DAY #:  [1 ]   Pt sitting in chair c/o pain asking for MORE Narcotics    VS:  T(C): 36.3 (07-01-18 @ 04:52), Max: 37.1 (06-30-18 @ 10:44)  T(F): 97.4 (07-01-18 @ 04:52), Max: 98.7 (06-30-18 @ 10:44)  HR: 66 (07-01-18 @ 04:52) (55 - 75)  BP: 100/56 (07-01-18 @ 04:52) (95/55 - 147/68)  RR: 18 (07-01-18 @ 04:52) (14 - 18)  SpO2: 96% (07-01-18 @ 04:52) (92% - 99%)    PHYSICAL EXAM:  NAD, mildly lethargic yet oriented  Chest: clear / diminished BS  EXT:   RLE  [x ] Aquacel Dressing C/D/I  Trzction site dsg chged  No Calf Tenderness  (+) DF/PF; (+) Distal Pulses;  Sensation: No gross deficits noted  Pulses [1+  ]   B/L, PAS     LABS:                        10.5<L>  11.2<H> )-----------( 350      ( 30 Jun 2018 18:45 )             31.1<L>    07-01    137  |  98  |  21  ----------------------------<  168<H>  4.3   |  28  |  0.90      : Patient is a 72y old  Female who presents with a chief complaint of Direct Admission - Transfer from [facility] for Periprosthetic R Femur Fracture (28 Jun 2018 19:22)      POST OPERATIVE DAY #:  [1 ]   Pt sitting in chair c/o pain asking for MORE Narcotics    VS:  T(C): 36.3 (07-01-18 @ 04:52), Max: 37.1 (06-30-18 @ 10:44)  T(F): 97.4 (07-01-18 @ 04:52), Max: 98.7 (06-30-18 @ 10:44)  HR: 66 (07-01-18 @ 04:52) (55 - 75)  BP: 100/56 (07-01-18 @ 04:52) (95/55 - 147/68)  RR: 18 (07-01-18 @ 04:52) (14 - 18)  SpO2: 96% (07-01-18 @ 04:52) (92% - 99%)    PHYSICAL EXAM:  NAD, mildly lethargic yet oriented  Chest: clear / diminished BS  EXT:   RLE  [x ] Aquacel Dressing C/D/I  Trzction site dsg chged  No Calf Tenderness  (+) DF/PF; (+) Distal Pulses;  Sensation: No gross deficits noted  Pulses [1+  ]   B/L, PAS     LABS:                        10.5<L>  11.2<H> )-----------( 350      ( 30 Jun 2018 18:45 )             31.1<L>    07-01    137  |  98  |  21  ----------------------------<  168<H>  4.3   |  28  |  0.90      U/Cx: NGTD (7/1)

## 2018-07-01 NOTE — PHYSICAL THERAPY INITIAL EVALUATION ADULT - PLANNED THERAPY INTERVENTIONS, PT EVAL
transfer training/bed mobility training/stair training- GOAL: 1 flight with rail independent, 4 weeks/gait training

## 2018-07-01 NOTE — PHYSICAL THERAPY INITIAL EVALUATION ADULT - PERTINENT HX OF CURRENT PROBLEM, REHAB EVAL
72 y/oF transferred from [facility] on 6/28 for periprosthetic R femur fx. S/p mechanical fall in kitchen several days pta. Was taken by ambulance to [facility], subsequently transferred to [facility]. ORIF R hip performed 6/30. 72 y/oF transferred from [facility] on 6/28 for periprosthetic R femur fx. S/p mechanical fall in kitchen several days pta. Was taken by ambulance to [facility], subsequently transferred to [facility]. ORIF R hip performed 6/30. As per history from PT eval from earlier admission 5/15, pt with hx of femoral neck fx s/p CRPP at [facility] 10/17. 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 S/p YARI and USAMA on 5/21/18(cont below)

## 2018-07-01 NOTE — PHYSICAL THERAPY INITIAL EVALUATION ADULT - GAIT DEVIATIONS NOTED, PT EVAL
increased stride width/decreased weight-shifting ability/decreased jaylene/increased time in double stance/decreased stride length

## 2018-07-01 NOTE — PROGRESS NOTE ADULT - SUBJECTIVE AND OBJECTIVE BOX
Follow-up Pulm Progress Note    The patient was seen and examined. Notes reviewed and discussed with staff/team as applicable      No new respiratory events overnight.      Denies: SOB, Chest pain, increased cough, colored phlegm, hemoptysis, N/V/D, neck stiffness, dysuria  ROS otherwise within normal limits    Vital Signs Last 24 Hrs  T(C): 36.9 (01 Jul 2018 09:02), Max: 36.9 (01 Jul 2018 09:02)  T(F): 98.4 (01 Jul 2018 09:02), Max: 98.4 (01 Jul 2018 09:02)  HR: 74 (01 Jul 2018 09:25) (55 - 78)  BP: 133/56 (01 Jul 2018 09:25) (95/55 - 138/60)  BP(mean): 72 (30 Jun 2018 20:35) (72 - 87)  RR: 18 (01 Jul 2018 09:02) (14 - 18)  SpO2: 97% (01 Jul 2018 09:02) (92% - 99%)    ABG - ( 30 Jun 2018 15:06 )  pH, Arterial: 7.37  pH, Blood: x     /  pCO2: 49    /  pO2: 87    / HCO3: 28    / Base Excess: 2.7   /  SaO2: 94                    06-30 @ 07:01  -  07-01 @ 07:00  --------------------------------------------------------  IN: 1000 mL / OUT: 700 mL / NET: 300 mL          Medications:  MEDICATIONS  (STANDING):  acetaminophen  IVPB. 1000 milliGRAM(s) IV Intermittent once  aspirin enteric coated 325 milliGRAM(s) Oral two times a day  buDESOnide   0.5 milliGRAM(s) Respule 0.5 milliGRAM(s) Inhalation two times a day  celecoxib 200 milliGRAM(s) Oral daily  docusate sodium 100 milliGRAM(s) Oral three times a day  hydrALAZINE 50 milliGRAM(s) Oral every 8 hours  labetalol 200 milliGRAM(s) Oral three times a day  lactated ringers. 1000 milliLiter(s) (100 mL/Hr) IV Continuous <Continuous>  levoFLOXacin IVPB 250 milliGRAM(s) IV Intermittent every 24 hours  nicotine - 21 mG/24Hr(s) Patch 1 patch Transdermal daily  NIFEdipine XL 90 milliGRAM(s) Oral daily  pantoprazole    Tablet 40 milliGRAM(s) Oral daily  polyethylene glycol 3350 17 Gram(s) Oral daily  QUEtiapine 150 milliGRAM(s) Oral at bedtime  thiamine 100 milliGRAM(s) Oral daily  traMADol 50 milliGRAM(s) Oral every 8 hours    MEDICATIONS  (PRN):  aluminum hydroxide/magnesium hydroxide/simethicone Suspension 30 milliLiter(s) Oral four times a day PRN Indigestion  guaiFENesin   Syrup  (Sugar-Free) 200 milliGRAM(s) Oral every 6 hours PRN Cough  HYDROmorphone   Tablet 4 milliGRAM(s) Oral every 3 hours PRN Severe Pain (7 - 10)  HYDROmorphone  Injectable 0.5 milliGRAM(s) IV Push every 4 hours PRN break through pain  magnesium hydroxide Suspension 30 milliLiter(s) Oral daily PRN Constipation  ondansetron Injectable 4 milliGRAM(s) IV Push every 6 hours PRN Nausea and/or Vomiting  QUEtiapine 50 milliGRAM(s) Oral every 6 hours PRN Anxiety  senna 2 Tablet(s) Oral at bedtime PRN Constipation      Allergies    No Known Allergies    Intolerances        Vent settings (if applicable)        Physical Examination:    Pleasant  Neck: no JVD, LAD, accessory muscle use  PULM: Clear to auscultation bilaterally, no wheezes, rales, rhonchi  CVS: Regular rate and rhythm, S1S2, no murmurs, rubs, or gallops  Abdomen:  Extremities:  Neuro:      LABS:                        9.0    10.19 )-----------( 345      ( 01 Jul 2018 08:34 )             27.8     07-01    137  |  98  |  21  ----------------------------<  168<H>  4.3   |  28  |  0.90    Ca    7.7<L>      01 Jul 2018 06:39            CAPILLARY BLOOD GLUCOSE        PT/INR - ( 30 Jun 2018 05:02 )   PT: 13.6 sec;   INR: 1.25 ratio         PTT - ( 30 Jun 2018 05:02 )  PTT:28.3 sec          CULTURES:  Culture Results:   No growth (06-29 @ 14:03)    Most recent blood culture -- 06-29 @ 14:03   -- -- .Urine Catheterized 06-29 @ 14:03  Most recent blood culture   NO ORGANISMS ISOLATED  NO ORGANISMS ISOLATED AT 72 HRS. 06-28 @ 11:02   -- -- BLOOD 06-28 @ 11:02      RADIOLOGY REVIEWED    CXR:      CT chest:      Other: Follow-up Pulm Progress Note    The patient was seen and examined. Notes reviewed and discussed with staff/team as applicable.    POD 1 following repair of right hip fracture-extensive repair).    Occasional cough with some chest congestion, scant sputum.  Without increased dyspnea.    Denies: Chest pain, increased cough, colored phlegm, hemoptysis, N/V/D, neck stiffness, dysuria  Intermittent pain at the surgical site    Vital Signs Last 24 Hrs  T(C): 36.9 (01 Jul 2018 09:02), Max: 36.9 (01 Jul 2018 09:02)  T(F): 98.4 (01 Jul 2018 09:02), Max: 98.4 (01 Jul 2018 09:02)  HR: 74 (01 Jul 2018 09:25) (55 - 78)  BP: 133/56 (01 Jul 2018 09:25) (95/55 - 138/60)  BP(mean): 72 (30 Jun 2018 20:35) (72 - 87)  RR: 18 (01 Jul 2018 09:02) (14 - 18)  SpO2: 97% (01 Jul 2018 09:02) (92% - 99%)    ABG - ( 30 Jun 2018 15:06 )  pH, Arterial: 7.37  pH, Blood: x     /  pCO2: 49    /  pO2: 87    / HCO3: 28    / Base Excess: 2.7   /  SaO2: 94                    06-30 @ 07:01  -  07-01 @ 07:00  --------------------------------------------------------  IN: 1000 mL / OUT: 700 mL / NET: 300 mL          Medications:  MEDICATIONS  (STANDING):  acetaminophen  IVPB. 1000 milliGRAM(s) IV Intermittent once  aspirin enteric coated 325 milliGRAM(s) Oral two times a day  buDESOnide   0.5 milliGRAM(s) Respule 0.5 milliGRAM(s) Inhalation two times a day  celecoxib 200 milliGRAM(s) Oral daily  docusate sodium 100 milliGRAM(s) Oral three times a day  hydrALAZINE 50 milliGRAM(s) Oral every 8 hours  labetalol 200 milliGRAM(s) Oral three times a day  lactated ringers. 1000 milliLiter(s) (100 mL/Hr) IV Continuous <Continuous>  levoFLOXacin IVPB 250 milliGRAM(s) IV Intermittent every 24 hours  nicotine - 21 mG/24Hr(s) Patch 1 patch Transdermal daily  NIFEdipine XL 90 milliGRAM(s) Oral daily  pantoprazole    Tablet 40 milliGRAM(s) Oral daily  polyethylene glycol 3350 17 Gram(s) Oral daily  QUEtiapine 150 milliGRAM(s) Oral at bedtime  thiamine 100 milliGRAM(s) Oral daily  traMADol 50 milliGRAM(s) Oral every 8 hours    MEDICATIONS  (PRN):  aluminum hydroxide/magnesium hydroxide/simethicone Suspension 30 milliLiter(s) Oral four times a day PRN Indigestion  guaiFENesin   Syrup  (Sugar-Free) 200 milliGRAM(s) Oral every 6 hours PRN Cough  HYDROmorphone   Tablet 4 milliGRAM(s) Oral every 3 hours PRN Severe Pain (7 - 10)  HYDROmorphone  Injectable 0.5 milliGRAM(s) IV Push every 4 hours PRN break through pain  magnesium hydroxide Suspension 30 milliLiter(s) Oral daily PRN Constipation  ondansetron Injectable 4 milliGRAM(s) IV Push every 6 hours PRN Nausea and/or Vomiting  QUEtiapine 50 milliGRAM(s) Oral every 6 hours PRN Anxiety  senna 2 Tablet(s) Oral at bedtime PRN Constipation      Allergies    No Known Allergies    Intolerances        Vent settings (if applicable)        Physical Examination:    Pleasant  Neck: no JVD, LAD, accessory muscle use  PULM: coarse breath sounds bilaterally with a few rhonchi on forced expiration, no wheezes, rales  CVS: Regular rate and rhythm, S1S2, no murmurs, rubs, or gallops  Abdomen: soft, normoactive bowel sounds  Extremities: post-repair of right hip fracture, site covered, mild pedal edema bilaterally  Neuro: alert, appropriately interactive, grossly intact      LABS:                        9.0    10.19 )-----------( 345      ( 01 Jul 2018 08:34 )             27.8     07-01    137  |  98  |  21  ----------------------------<  168<H>  4.3   |  28  |  0.90    Ca    7.7<L>      01 Jul 2018 06:39            CAPILLARY BLOOD GLUCOSE        PT/INR - ( 30 Jun 2018 05:02 )   PT: 13.6 sec;   INR: 1.25 ratio         PTT - ( 30 Jun 2018 05:02 )  PTT:28.3 sec          CULTURES:  Culture Results:   No growth (06-29 @ 14:03)    Most recent blood culture -- 06-29 @ 14:03   -- -- .Urine Catheterized 06-29 @ 14:03  Most recent blood culture   NO ORGANISMS ISOLATED  NO ORGANISMS ISOLATED AT 72 HRS. 06-28 @ 11:02   -- -- BLOOD 06-28 @ 11:02      RADIOLOGY REVIEWED    CXR:      CT chest:      Other:

## 2018-07-02 LAB
ANION GAP SERPL CALC-SCNC: 11 MMOL/L — SIGNIFICANT CHANGE UP (ref 5–17)
BUN SERPL-MCNC: 22 MG/DL — SIGNIFICANT CHANGE UP (ref 7–23)
CALCIUM SERPL-MCNC: 7.9 MG/DL — LOW (ref 8.4–10.5)
CHLORIDE SERPL-SCNC: 99 MMOL/L — SIGNIFICANT CHANGE UP (ref 96–108)
CO2 SERPL-SCNC: 28 MMOL/L — SIGNIFICANT CHANGE UP (ref 22–31)
CREAT SERPL-MCNC: 0.88 MG/DL — SIGNIFICANT CHANGE UP (ref 0.5–1.3)
GLUCOSE SERPL-MCNC: 93 MG/DL — SIGNIFICANT CHANGE UP (ref 70–99)
HCT VFR BLD CALC: 26.4 % — LOW (ref 34.5–45)
HGB BLD-MCNC: 8.4 G/DL — LOW (ref 11.5–15.5)
MCHC RBC-ENTMCNC: 30.7 PG — SIGNIFICANT CHANGE UP (ref 27–34)
MCHC RBC-ENTMCNC: 31.8 GM/DL — LOW (ref 32–36)
MCV RBC AUTO: 96.4 FL — SIGNIFICANT CHANGE UP (ref 80–100)
PLATELET # BLD AUTO: 322 K/UL — SIGNIFICANT CHANGE UP (ref 150–400)
POTASSIUM SERPL-MCNC: 4.1 MMOL/L — SIGNIFICANT CHANGE UP (ref 3.5–5.3)
POTASSIUM SERPL-SCNC: 4.1 MMOL/L — SIGNIFICANT CHANGE UP (ref 3.5–5.3)
RBC # BLD: 2.74 M/UL — LOW (ref 3.8–5.2)
RBC # FLD: 15.1 % — HIGH (ref 10.3–14.5)
SODIUM SERPL-SCNC: 138 MMOL/L — SIGNIFICANT CHANGE UP (ref 135–145)
WBC # BLD: 9.31 K/UL — SIGNIFICANT CHANGE UP (ref 3.8–10.5)
WBC # FLD AUTO: 9.31 K/UL — SIGNIFICANT CHANGE UP (ref 3.8–10.5)

## 2018-07-02 PROCEDURE — 99222 1ST HOSP IP/OBS MODERATE 55: CPT

## 2018-07-02 PROCEDURE — 71045 X-RAY EXAM CHEST 1 VIEW: CPT | Mod: 26

## 2018-07-02 RX ORDER — ACETAMINOPHEN 500 MG
1000 TABLET ORAL ONCE
Qty: 0 | Refills: 0 | Status: COMPLETED | OUTPATIENT
Start: 2018-07-02 | End: 2018-07-02

## 2018-07-02 RX ORDER — KETOROLAC TROMETHAMINE 30 MG/ML
15 SYRINGE (ML) INJECTION ONCE
Qty: 0 | Refills: 0 | Status: DISCONTINUED | OUTPATIENT
Start: 2018-07-02 | End: 2018-07-02

## 2018-07-02 RX ORDER — HYDROMORPHONE HYDROCHLORIDE 2 MG/ML
4 INJECTION INTRAMUSCULAR; INTRAVENOUS; SUBCUTANEOUS
Qty: 0 | Refills: 0 | Status: DISCONTINUED | OUTPATIENT
Start: 2018-07-02 | End: 2018-07-03

## 2018-07-02 RX ORDER — OXYCODONE HYDROCHLORIDE 5 MG/1
10 TABLET ORAL EVERY 12 HOURS
Qty: 0 | Refills: 0 | Status: DISCONTINUED | OUTPATIENT
Start: 2018-07-02 | End: 2018-07-03

## 2018-07-02 RX ORDER — HYDROMORPHONE HYDROCHLORIDE 2 MG/ML
6 INJECTION INTRAMUSCULAR; INTRAVENOUS; SUBCUTANEOUS
Qty: 0 | Refills: 0 | Status: DISCONTINUED | OUTPATIENT
Start: 2018-07-02 | End: 2018-07-03

## 2018-07-02 RX ORDER — IPRATROPIUM/ALBUTEROL SULFATE 18-103MCG
3 AEROSOL WITH ADAPTER (GRAM) INHALATION EVERY 6 HOURS
Qty: 0 | Refills: 0 | Status: DISCONTINUED | OUTPATIENT
Start: 2018-07-02 | End: 2018-07-03

## 2018-07-02 RX ADMIN — HYDROMORPHONE HYDROCHLORIDE 0.5 MILLIGRAM(S): 2 INJECTION INTRAMUSCULAR; INTRAVENOUS; SUBCUTANEOUS at 06:57

## 2018-07-02 RX ADMIN — Medication 200 MILLIGRAM(S): at 05:31

## 2018-07-02 RX ADMIN — TRAMADOL HYDROCHLORIDE 50 MILLIGRAM(S): 50 TABLET ORAL at 21:16

## 2018-07-02 RX ADMIN — HYDROMORPHONE HYDROCHLORIDE 6 MILLIGRAM(S): 2 INJECTION INTRAMUSCULAR; INTRAVENOUS; SUBCUTANEOUS at 20:14

## 2018-07-02 RX ADMIN — Medication 0.5 MILLIGRAM(S): at 17:41

## 2018-07-02 RX ADMIN — TRAMADOL HYDROCHLORIDE 50 MILLIGRAM(S): 50 TABLET ORAL at 13:16

## 2018-07-02 RX ADMIN — Medication 3 MILLILITER(S): at 17:42

## 2018-07-02 RX ADMIN — Medication 1 PATCH: at 11:55

## 2018-07-02 RX ADMIN — Medication 200 MILLIGRAM(S): at 15:03

## 2018-07-02 RX ADMIN — Medication 15 MILLIGRAM(S): at 22:21

## 2018-07-02 RX ADMIN — PANTOPRAZOLE SODIUM 40 MILLIGRAM(S): 20 TABLET, DELAYED RELEASE ORAL at 11:25

## 2018-07-02 RX ADMIN — Medication 400 MILLIGRAM(S): at 17:40

## 2018-07-02 RX ADMIN — TRAMADOL HYDROCHLORIDE 50 MILLIGRAM(S): 50 TABLET ORAL at 21:18

## 2018-07-02 RX ADMIN — OXYCODONE HYDROCHLORIDE 10 MILLIGRAM(S): 5 TABLET ORAL at 10:13

## 2018-07-02 RX ADMIN — Medication 3 MILLILITER(S): at 11:55

## 2018-07-02 RX ADMIN — HYDROMORPHONE HYDROCHLORIDE 6 MILLIGRAM(S): 2 INJECTION INTRAMUSCULAR; INTRAVENOUS; SUBCUTANEOUS at 13:56

## 2018-07-02 RX ADMIN — OXYCODONE HYDROCHLORIDE 10 MILLIGRAM(S): 5 TABLET ORAL at 09:44

## 2018-07-02 RX ADMIN — Medication 325 MILLIGRAM(S): at 05:31

## 2018-07-02 RX ADMIN — HYDROMORPHONE HYDROCHLORIDE 0.5 MILLIGRAM(S): 2 INJECTION INTRAMUSCULAR; INTRAVENOUS; SUBCUTANEOUS at 02:06

## 2018-07-02 RX ADMIN — HYDROMORPHONE HYDROCHLORIDE 6 MILLIGRAM(S): 2 INJECTION INTRAMUSCULAR; INTRAVENOUS; SUBCUTANEOUS at 13:06

## 2018-07-02 RX ADMIN — HYDROMORPHONE HYDROCHLORIDE 4 MILLIGRAM(S): 2 INJECTION INTRAMUSCULAR; INTRAVENOUS; SUBCUTANEOUS at 00:36

## 2018-07-02 RX ADMIN — Medication 50 MILLIGRAM(S): at 21:16

## 2018-07-02 RX ADMIN — HYDROMORPHONE HYDROCHLORIDE 6 MILLIGRAM(S): 2 INJECTION INTRAMUSCULAR; INTRAVENOUS; SUBCUTANEOUS at 15:41

## 2018-07-02 RX ADMIN — OXYCODONE HYDROCHLORIDE 10 MILLIGRAM(S): 5 TABLET ORAL at 21:16

## 2018-07-02 RX ADMIN — HYDROMORPHONE HYDROCHLORIDE 0.5 MILLIGRAM(S): 2 INJECTION INTRAMUSCULAR; INTRAVENOUS; SUBCUTANEOUS at 01:51

## 2018-07-02 RX ADMIN — CELECOXIB 200 MILLIGRAM(S): 200 CAPSULE ORAL at 11:25

## 2018-07-02 RX ADMIN — HYDROMORPHONE HYDROCHLORIDE 4 MILLIGRAM(S): 2 INJECTION INTRAMUSCULAR; INTRAVENOUS; SUBCUTANEOUS at 06:02

## 2018-07-02 RX ADMIN — TRAMADOL HYDROCHLORIDE 50 MILLIGRAM(S): 50 TABLET ORAL at 06:00

## 2018-07-02 RX ADMIN — Medication 0.5 MILLIGRAM(S): at 05:34

## 2018-07-02 RX ADMIN — HYDROMORPHONE HYDROCHLORIDE 4 MILLIGRAM(S): 2 INJECTION INTRAMUSCULAR; INTRAVENOUS; SUBCUTANEOUS at 05:32

## 2018-07-02 RX ADMIN — Medication 100 MILLIGRAM(S): at 11:25

## 2018-07-02 RX ADMIN — HYDROMORPHONE HYDROCHLORIDE 0.5 MILLIGRAM(S): 2 INJECTION INTRAMUSCULAR; INTRAVENOUS; SUBCUTANEOUS at 06:42

## 2018-07-02 RX ADMIN — Medication 90 MILLIGRAM(S): at 05:31

## 2018-07-02 RX ADMIN — CELECOXIB 200 MILLIGRAM(S): 200 CAPSULE ORAL at 11:55

## 2018-07-02 RX ADMIN — Medication 15 MILLIGRAM(S): at 22:36

## 2018-07-02 RX ADMIN — Medication 400 MILLIGRAM(S): at 09:44

## 2018-07-02 RX ADMIN — QUETIAPINE FUMARATE 150 MILLIGRAM(S): 200 TABLET, FILM COATED ORAL at 21:16

## 2018-07-02 RX ADMIN — HYDROMORPHONE HYDROCHLORIDE 4 MILLIGRAM(S): 2 INJECTION INTRAMUSCULAR; INTRAVENOUS; SUBCUTANEOUS at 00:06

## 2018-07-02 RX ADMIN — Medication 200 MILLIGRAM(S): at 21:16

## 2018-07-02 RX ADMIN — TRAMADOL HYDROCHLORIDE 50 MILLIGRAM(S): 50 TABLET ORAL at 05:31

## 2018-07-02 RX ADMIN — HYDROMORPHONE HYDROCHLORIDE 4 MILLIGRAM(S): 2 INJECTION INTRAMUSCULAR; INTRAVENOUS; SUBCUTANEOUS at 09:13

## 2018-07-02 RX ADMIN — HYDROMORPHONE HYDROCHLORIDE 6 MILLIGRAM(S): 2 INJECTION INTRAMUSCULAR; INTRAVENOUS; SUBCUTANEOUS at 20:44

## 2018-07-02 RX ADMIN — Medication 1000 MILLIGRAM(S): at 10:13

## 2018-07-02 RX ADMIN — HYDROMORPHONE HYDROCHLORIDE 4 MILLIGRAM(S): 2 INJECTION INTRAMUSCULAR; INTRAVENOUS; SUBCUTANEOUS at 10:13

## 2018-07-02 RX ADMIN — Medication 50 MILLIGRAM(S): at 15:03

## 2018-07-02 RX ADMIN — Medication 1 PATCH: at 13:56

## 2018-07-02 RX ADMIN — HYDROMORPHONE HYDROCHLORIDE 6 MILLIGRAM(S): 2 INJECTION INTRAMUSCULAR; INTRAVENOUS; SUBCUTANEOUS at 16:15

## 2018-07-02 RX ADMIN — Medication 50 MILLIGRAM(S): at 05:32

## 2018-07-02 RX ADMIN — TRAMADOL HYDROCHLORIDE 50 MILLIGRAM(S): 50 TABLET ORAL at 13:56

## 2018-07-02 NOTE — PROGRESS NOTE ADULT - ASSESSMENT
Assessment: s/p Open Reduction Internal Fixation / Surgical Repair R kika-prosthetic Fx POD2    Plan:  Pt apparently requires and has a high tolerance for larger doses of narcotics   I-Stop in chart  PT:  WBAT: posterior precautions  DVt proph: Ectron BId  Pulm and med notes appreciated: cont IV Abx till 7/2  Monitor V/S and sensorium  dispo planning

## 2018-07-02 NOTE — OCCUPATIONAL THERAPY INITIAL EVALUATION ADULT - PERTINENT HX OF CURRENT PROBLEM, REHAB EVAL
73 y/o F s/p mechanical fall in her kitchen a few days ago.  Pt admits to tripping over a garbage can in her kitchen and falling. She was taken by ambulance to [facility] and subsequently transferred to [facility].  Pt is a very poor historian and exact dates of the trip and fall and transfers are not readily available.  Pt now transferred to [facility] for definitive treatment of a R periprosthetic femur fracture. Pt s/p ORIF Rt periprosthetic femur fracture  See below

## 2018-07-02 NOTE — OCCUPATIONAL THERAPY INITIAL EVALUATION ADULT - BALANCE TRAINING, PT EVAL
Pt will demonstrate improved static/dynamic balance to good in order to increase safety and independence in ADLs within 4 weeks

## 2018-07-02 NOTE — PROGRESS NOTE ADULT - SUBJECTIVE AND OBJECTIVE BOX
Follow-up Pulm Progress Note    The patient was seen and examined. Notes reviewed and discussed with staff/team as applicable      No new respiratory events overnight. Pain better controlled. Using nebs and IC.      Denies:  increased SOB, Chest pain, increased cough, colored phlegm, hemoptysis, N/V/D, neck stiffness, dysuria      Vital Signs Last 24 Hrs  T(C): 36.8 (02 Jul 2018 05:19), Max: 36.9 (01 Jul 2018 09:02)  T(F): 98.2 (02 Jul 2018 05:19), Max: 98.4 (01 Jul 2018 09:02)  HR: 76 (02 Jul 2018 05:19) (74 - 81)  BP: 152/69 (02 Jul 2018 05:19) (129/72 - 152/69)  BP(mean): --  RR: 18 (02 Jul 2018 05:19) (18 - 18)  SpO2: 96% (02 Jul 2018 05:19) (94% - 97%)    ABG - ( 30 Jun 2018 15:06 )  pH, Arterial: 7.37  pH, Blood: x     /  pCO2: 49    /  pO2: 87    / HCO3: 28    / Base Excess: 2.7   /  SaO2: 94                    07-01 @ 07:01  -  07-02 @ 07:00  --------------------------------------------------------  IN: 1160 mL / OUT: 2400 mL / NET: -1240 mL          Medications:  MEDICATIONS  (STANDING):  aspirin enteric coated 325 milliGRAM(s) Oral two times a day  buDESOnide   0.5 milliGRAM(s) Respule 0.5 milliGRAM(s) Inhalation two times a day  celecoxib 200 milliGRAM(s) Oral daily  docusate sodium 100 milliGRAM(s) Oral three times a day  hydrALAZINE 50 milliGRAM(s) Oral every 8 hours  labetalol 200 milliGRAM(s) Oral three times a day  lactated ringers. 1000 milliLiter(s) (100 mL/Hr) IV Continuous <Continuous>  nicotine - 21 mG/24Hr(s) Patch 1 patch Transdermal daily  NIFEdipine XL 90 milliGRAM(s) Oral daily  pantoprazole    Tablet 40 milliGRAM(s) Oral daily  polyethylene glycol 3350 17 Gram(s) Oral daily  QUEtiapine 150 milliGRAM(s) Oral at bedtime  thiamine 100 milliGRAM(s) Oral daily  traMADol 50 milliGRAM(s) Oral every 8 hours    MEDICATIONS  (PRN):  aluminum hydroxide/magnesium hydroxide/simethicone Suspension 30 milliLiter(s) Oral four times a day PRN Indigestion  bisacodyl Suppository 10 milliGRAM(s) Rectal daily PRN If no bowel movement by POD#2  guaiFENesin   Syrup  (Sugar-Free) 200 milliGRAM(s) Oral every 6 hours PRN Cough  HYDROmorphone   Tablet 4 milliGRAM(s) Oral every 3 hours PRN Severe Pain (7 - 10)  HYDROmorphone  Injectable 0.5 milliGRAM(s) IV Push every 4 hours PRN break through pain  magnesium hydroxide Suspension 30 milliLiter(s) Oral daily PRN Constipation  ondansetron Injectable 4 milliGRAM(s) IV Push every 6 hours PRN Nausea and/or Vomiting  QUEtiapine 50 milliGRAM(s) Oral every 6 hours PRN Anxiety  senna 2 Tablet(s) Oral at bedtime PRN Constipation      Allergies    No Known Allergies    Intolerances        Vent settings (if applicable)        Physical Examination:    Pleasant elderly female, NAD  Neck: no JVD, LAD, accessory muscle use  PULM: Clear to auscultation bilaterally, no wheezes, rales, rhonchi  CVS: Regular rate and rhythm, S1S2, no murmurs, rubs, or gallops  Abdomen: soft, NT  Extremities: s/p surgery R, no sig edema  Neuro: non focal      LABS:                        9.0    10.19 )-----------( 345      ( 01 Jul 2018 08:34 )             27.8     07-02    138  |  99  |  22  ----------------------------<  93  4.1   |  28  |  0.88    Ca    7.9<L>      02 Jul 2018 06:45      CULTURES:  Culture Results:   No growth (06-29 @ 14:03)    Most recent blood culture -- 06-29 @ 14:03   -- -- .Urine Catheterized 06-29 @ 14:03  Most recent blood culture   NO ORGANISMS ISOLATED  NO ORGANISMS ISOLATED AT 72 HRS. 06-28 @ 11:02   -- -- BLOOD 06-28 @ 11:02

## 2018-07-02 NOTE — OCCUPATIONAL THERAPY INITIAL EVALUATION ADULT - LIVES WITH, PROFILE
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

## 2018-07-02 NOTE — PROGRESS NOTE ADULT - ASSESSMENT
Patient is a 72y old  Female who presents with Right Periprosthetic  Femur Fracture with ORIF on 06/30/18.  Patient with severe COPD. Now  post op out of bed in a chair with no SOB,  agitation, tachycardia  or alcohol withdrawal.  She continues  to do vigorous deep breathing exercises.  Daily post op lab continues for anemia, renal function and electrolytes. Doing well post op with no medical complications to date and proceeding with non weight bearing physical therapy.

## 2018-07-02 NOTE — PROGRESS NOTE ADULT - SUBJECTIVE AND OBJECTIVE BOX
Patient is a 72y old  Female who presents with a chief complaint of Direct Admission - Transfer from [facility] for Periprosthetic R Femur Fracture (28 Jun 2018 19:22)      POST OPERATIVE DAY #:  [2 ]   c/o right thigh pain controlled with pain meds. no acute events overnight. denies cp/sob currently.     Vital Signs Last 24 Hrs  T(C): 36.8 (02 Jul 2018 05:19), Max: 36.9 (01 Jul 2018 09:02)  T(F): 98.2 (02 Jul 2018 05:19), Max: 98.4 (01 Jul 2018 09:02)  HR: 76 (02 Jul 2018 05:19) (74 - 81)  BP: 152/69 (02 Jul 2018 05:19) (129/72 - 152/69)  BP(mean): --  RR: 18 (02 Jul 2018 05:19) (18 - 18)  SpO2: 96% (02 Jul 2018 05:19) (94% - 97%)    PHYSICAL EXAM:  NAD, oriented  RLE  [x ] Aquacel Dressing C/D/I  Traction site dsg c/d/i  No Calf Tenderness  (+) DF/PF; (+) Distal Pulses;  Sensation: No gross deficits noted  Pulses [1+  ]   B/L, PAS

## 2018-07-02 NOTE — CONSULT NOTE ADULT - ASSESSMENT
73 yo woman with a long hx of tobacco use present s/p fall at home with a right periprosthetic hip fx scheduled for a ORIF
1. Right periprosthetic femur fracture: s/p ORIF, management per ortho, pain improved with current medication, DVT prophylaxis with ASA  2. Depression/anxiety: resumed home dose seroquel, patient is asking vallium, she said she was on it before. PMR checked: wasn't prescribed vallium before. will NOT suggest vallium use.   3. Fall prevention discussed  4. COPD: controlled with duoneb with steroid   5. Nicotine dependent: nicotine patch  6. HTN: controlled
Imp: The patient has a history of COPD, recent pneumonia, she is an active smoker, and she has a history of apparent heavy Etoh use (last drink reportedly more than a week ago).  She has a history of a right femoral fracture as above and is scheduled for semi-emergent surgery.  She is increased risk for pulmonary complications in the perioperative period, but there is no absolute contraindication to surgery from our perspective.    Plan:  Continue antibiotics  Supplemental O2 to maintain saturation 92% or above watching for evidence of CO2 retention.  Incentive spirometry.  Duoneb q6 h with budesonide via neb every 12 hours  Guaifenesin dm  Encouraging secretion clearance, pulmonary toilet prn.  Elevate HOB.  Aspiration precaution  Judicious CV/fluid management  Standard prophylactic measures  Following CxR  Judicious pain control    Discussed with the [facility] [place] staff and the orthopedic house staff.    Thank you for this consult.  Will follow.

## 2018-07-02 NOTE — PROGRESS NOTE ADULT - ASSESSMENT
Imp:   COPD,   recent pneumonia,   active smoker,   s/p ORIF R hip fx POD #2    Plan:  Continue antibiotics thru today  Supplemental O2 to maintain saturation 92% or above watching for evidence of CO2 retention.  Incentive spirometry.  Duoneb q6 h with budesonide via neb every 12 hours  Guaifenesin dm  Encouraging secretion clearance, pulmonary toilet prn.  Elevate HOB.  Aspiration precaution  Judicious CV/fluid management  Standard prophylactic measures  Follow up CxR this week  Judicious pain control  Routine post-operative care.    Giovany Dunne MD  Pulmonary Medicine  [phone]

## 2018-07-02 NOTE — PROGRESS NOTE ADULT - SUBJECTIVE AND OBJECTIVE BOX
Patient is a 72y old  Female who presents with a chief complaint of Direct Admission - Transfer from [facility] for Periprosthetic R Femur Fracture (28 Jun 2018 19:22)      HPI:  Patine with severe COPD seen post op sitting in chair no sob no agitation/tachycardia  or alcohol withdrawal.  Needs to do vigorous deep breathing exercises.  Follow post op cbc and BMP    MEDICATIONS  (STANDING):  ALBUTerol/ipratropium for Nebulization 3 milliLiter(s) Nebulizer every 6 hours  aspirin enteric coated 325 milliGRAM(s) Oral two times a day  buDESOnide   0.5 milliGRAM(s) Respule 0.5 milliGRAM(s) Inhalation two times a day  celecoxib 200 milliGRAM(s) Oral daily  docusate sodium 100 milliGRAM(s) Oral three times a day  hydrALAZINE 50 milliGRAM(s) Oral every 8 hours  labetalol 200 milliGRAM(s) Oral three times a day  lactated ringers. 1000 milliLiter(s) (100 mL/Hr) IV Continuous <Continuous>  nicotine - 21 mG/24Hr(s) Patch 1 patch Transdermal daily  NIFEdipine XL 90 milliGRAM(s) Oral daily  oxyCODONE  ER Tablet 10 milliGRAM(s) Oral every 12 hours  pantoprazole    Tablet 40 milliGRAM(s) Oral daily  polyethylene glycol 3350 17 Gram(s) Oral daily  QUEtiapine 150 milliGRAM(s) Oral at bedtime  thiamine 100 milliGRAM(s) Oral daily  traMADol 50 milliGRAM(s) Oral every 8 hours    MEDICATIONS  (PRN):  aluminum hydroxide/magnesium hydroxide/simethicone Suspension 30 milliLiter(s) Oral four times a day PRN Indigestion  bisacodyl Suppository 10 milliGRAM(s) Rectal daily PRN If no bowel movement by POD#2  guaiFENesin   Syrup  (Sugar-Free) 200 milliGRAM(s) Oral every 6 hours PRN Cough  HYDROmorphone   Tablet 4 milliGRAM(s) Oral every 3 hours PRN Moderate Pain (4 - 6)  HYDROmorphone   Tablet 6 milliGRAM(s) Oral every 3 hours PRN Severe Pain (7 - 10)  magnesium hydroxide Suspension 30 milliLiter(s) Oral daily PRN Constipation  ondansetron Injectable 4 milliGRAM(s) IV Push every 6 hours PRN Nausea and/or Vomiting  QUEtiapine 50 milliGRAM(s) Oral every 6 hours PRN Anxiety  senna 2 Tablet(s) Oral at bedtime PRN Constipation      Allergies    No Known Allergies    Vital Signs Last 24 Hrs  T(C): 37.1 (02 Jul 2018 17:02), Max: 37.1 (02 Jul 2018 17:02)  T(F): 98.7 (02 Jul 2018 17:02), Max: 98.7 (02 Jul 2018 17:02)  HR: 74 (02 Jul 2018 17:02) (71 - 78)  BP: 121/54 (02 Jul 2018 17:02) (112/51 - 152/69)  BP(mean): --  RR: 18 (02 Jul 2018 17:02) (18 - 18)  SpO2: 95% (02 Jul 2018 17:02) (95% - 99%)    PHYSICAL EXAM:  GENERAL: NAD, well nourished and conversant  HEAD:  Atraumatic  EYES: EOM, PERRLA, conjunctiva pink and sclera white  ENT: No tonsillar erythema, exudates, or enlargement, moist mucous membranes, good dentition, no lesions  NECK: Supple, No JVD, normal thyroid, carotids with normal upstrokes and no bruits  CHEST/LUNG: Clear to auscultation bilaterally, No rales, rhonchi, wheezing, or rubs  HEART: Regular rate and rhythm, No murmurs, rubs, or gallops  ABDOMEN: Soft, nondistended, no masses, guarding, tenderness or rebound, bowel sounds present  EXTREMITIES:  2+ Peripheral Pulses, No clubbing, cyanosis, or edema.   s/p repair of periprosthetic femur fracture  LYMPH: No lymphadenopathy noted  SKIN: No rashes or lesions  NERVOUS SYSTEM:  Alert & Oriented X3, normal cognitive function. Motor Strength 5/5 right upper and right lower.  5/5 left upper and left lower extremities, DTRs 2+ intact and symmetric    LABS:          CBC Full  -  ( 02 Jul 2018 08:05 )  WBC Count : 9.31 K/uL  Hemoglobin : 8.4 g/dL  Hematocrit : 26.4 %  Platelet Count - Automated : 322 K/uL  Mean Cell Volume : 96.4 fl  Mean Cell Hemoglobin : 30.7 pg  Mean Cell Hemoglobin Concentration : 31.8 gm/dL  Auto Neutrophil # : x  Auto Lymphocyte # : x  Auto Monocyte # : x  Auto Eosinophil # : x  Auto Basophil # : x  Auto Neutrophil % : x  Auto Lymphocyte % : x  Auto Monocyte % : x  Auto Eosinophil % : x  Auto Basophil % : x    07-02    138  |  99  |  22  ----------------------------<  93  4.1   |  28  |  0.88    Ca    7.9<L>      02 Jul 2018 06:45 Patient is a 72y old  Female who presents with Right Periprosthetic  Femur Fracture with ORIF on 06/30/18.  Patient with severe COPD. Now  post op out of bed in a chair with no SOB,  agitation, tachycardia  or alcohol withdrawal.  She continues  to do vigorous deep breathing exercises.  Daily post op lab continues for anemia, renal function and electrolytes. Doing well post op with no medical complications to date and proceeding with non weight bearing physical therapy.    MEDICATIONS  (STANDING):  ALBUTerol/ipratropium for Nebulization 3 milliLiter(s) Nebulizer every 6 hours  aspirin enteric coated 325 milliGRAM(s) Oral two times a day  buDESOnide   0.5 milliGRAM(s) Respule 0.5 milliGRAM(s) Inhalation two times a day  celecoxib 200 milliGRAM(s) Oral daily  docusate sodium 100 milliGRAM(s) Oral three times a day  hydrALAZINE 50 milliGRAM(s) Oral every 8 hours  labetalol 200 milliGRAM(s) Oral three times a day  lactated ringers. 1000 milliLiter(s) (100 mL/Hr) IV Continuous <Continuous>  nicotine - 21 mG/24Hr(s) Patch 1 patch Transdermal daily  NIFEdipine XL 90 milliGRAM(s) Oral daily  oxyCODONE  ER Tablet 10 milliGRAM(s) Oral every 12 hours  pantoprazole    Tablet 40 milliGRAM(s) Oral daily  polyethylene glycol 3350 17 Gram(s) Oral daily  QUEtiapine 150 milliGRAM(s) Oral at bedtime  thiamine 100 milliGRAM(s) Oral daily  traMADol 50 milliGRAM(s) Oral every 8 hours    MEDICATIONS  (PRN):  aluminum hydroxide/magnesium hydroxide/simethicone Suspension 30 milliLiter(s) Oral four times a day PRN Indigestion  bisacodyl Suppository 10 milliGRAM(s) Rectal daily PRN If no bowel movement by POD#2  guaiFENesin   Syrup  (Sugar-Free) 200 milliGRAM(s) Oral every 6 hours PRN Cough  HYDROmorphone   Tablet 4 milliGRAM(s) Oral every 3 hours PRN Moderate Pain (4 - 6)  HYDROmorphone   Tablet 6 milliGRAM(s) Oral every 3 hours PRN Severe Pain (7 - 10)  magnesium hydroxide Suspension 30 milliLiter(s) Oral daily PRN Constipation  ondansetron Injectable 4 milliGRAM(s) IV Push every 6 hours PRN Nausea and/or Vomiting  QUEtiapine 50 milliGRAM(s) Oral every 6 hours PRN Anxiety  senna 2 Tablet(s) Oral at bedtime PRN Constipation      Allergies    No Known Allergies    Vital Signs Last 24 Hrs  T(C): 37.1 (02 Jul 2018 17:02), Max: 37.1 (02 Jul 2018 17:02)  T(F): 98.7 (02 Jul 2018 17:02), Max: 98.7 (02 Jul 2018 17:02)  HR: 74 (02 Jul 2018 17:02) (71 - 78)  BP: 121/54 (02 Jul 2018 17:02) (112/51 - 152/69)  BP(mean): --  RR: 18 (02 Jul 2018 17:02) (18 - 18)  SpO2: 95% (02 Jul 2018 17:02) (95% - 99%)    PHYSICAL EXAM:  GENERAL: NAD, well nourished and conversant  HEAD:  Atraumatic  EYES: EOM, PERRLA, conjunctiva pink and sclera white  ENT: No tonsillar erythema, exudates, or enlargement, moist mucous membranes, good dentition, no lesions  NECK: Supple, No JVD, normal thyroid, carotids with normal upstrokes and no bruits  CHEST/LUNG: Clear to auscultation bilaterally, No rales, rhonchi, wheezing, or rubs  HEART: Regular rate and rhythm, No murmurs, rubs, or gallops  ABDOMEN: Soft, nondistended, no masses, guarding, tenderness or rebound, bowel sounds present  EXTREMITIES:  2+ Peripheral Pulses, No clubbing, cyanosis, or edema.   s/p repair of periprosthetic femur fracture  LYMPH: No lymphadenopathy noted  SKIN: No rashes or lesions  NERVOUS SYSTEM:  Alert & Oriented X3, normal cognitive function. Motor Strength 5/5 right upper and right lower.  5/5 left upper and left lower extremities, DTRs 2+ intact and symmetric    LABS:          CBC Full  -  ( 02 Jul 2018 08:05 )  WBC Count : 9.31 K/uL  Hemoglobin : 8.4 g/dL  Hematocrit : 26.4 %  Platelet Count - Automated : 322 K/uL  Mean Cell Volume : 96.4 fl  Mean Cell Hemoglobin : 30.7 pg  Mean Cell Hemoglobin Concentration : 31.8 gm/dL  Auto Neutrophil # : x  Auto Lymphocyte # : x  Auto Monocyte # : x  Auto Eosinophil # : x  Auto Basophil # : x  Auto Neutrophil % : x  Auto Lymphocyte % : x  Auto Monocyte % : x  Auto Eosinophil % : x  Auto Basophil % : x    07-02    138  |  99  |  22  ----------------------------<  93  4.1   |  28  |  0.88    Ca    7.9<L>      02 Jul 2018 06:45

## 2018-07-03 ENCOUNTER — TRANSCRIPTION ENCOUNTER (OUTPATIENT)
Age: 73
End: 2018-07-03

## 2018-07-03 VITALS
SYSTOLIC BLOOD PRESSURE: 114 MMHG | HEART RATE: 83 BPM | OXYGEN SATURATION: 98 % | TEMPERATURE: 98 F | RESPIRATION RATE: 18 BRPM | DIASTOLIC BLOOD PRESSURE: 58 MMHG

## 2018-07-03 LAB
ANION GAP SERPL CALC-SCNC: 12 MMOL/L — SIGNIFICANT CHANGE UP (ref 5–17)
BACTERIA BLD CULT: SIGNIFICANT CHANGE UP
BUN SERPL-MCNC: 28 MG/DL — HIGH (ref 7–23)
CALCIUM SERPL-MCNC: 8.3 MG/DL — LOW (ref 8.4–10.5)
CHLORIDE SERPL-SCNC: 98 MMOL/L — SIGNIFICANT CHANGE UP (ref 96–108)
CO2 SERPL-SCNC: 27 MMOL/L — SIGNIFICANT CHANGE UP (ref 22–31)
CREAT SERPL-MCNC: 0.88 MG/DL — SIGNIFICANT CHANGE UP (ref 0.5–1.3)
GLUCOSE SERPL-MCNC: 112 MG/DL — HIGH (ref 70–99)
HCT VFR BLD CALC: 28.4 % — LOW (ref 34.5–45)
HGB BLD-MCNC: 9.3 G/DL — LOW (ref 11.5–15.5)
MCHC RBC-ENTMCNC: 32.6 PG — SIGNIFICANT CHANGE UP (ref 27–34)
MCHC RBC-ENTMCNC: 32.8 GM/DL — SIGNIFICANT CHANGE UP (ref 32–36)
MCV RBC AUTO: 99.4 FL — SIGNIFICANT CHANGE UP (ref 80–100)
PLATELET # BLD AUTO: 331 K/UL — SIGNIFICANT CHANGE UP (ref 150–400)
POTASSIUM SERPL-MCNC: 4.1 MMOL/L — SIGNIFICANT CHANGE UP (ref 3.5–5.3)
POTASSIUM SERPL-SCNC: 4.1 MMOL/L — SIGNIFICANT CHANGE UP (ref 3.5–5.3)
RBC # BLD: 2.85 M/UL — LOW (ref 3.8–5.2)
RBC # FLD: 13.5 % — SIGNIFICANT CHANGE UP (ref 10.3–14.5)
SODIUM SERPL-SCNC: 137 MMOL/L — SIGNIFICANT CHANGE UP (ref 135–145)
WBC # BLD: 10.4 K/UL — SIGNIFICANT CHANGE UP (ref 3.8–10.5)
WBC # FLD AUTO: 10.4 K/UL — SIGNIFICANT CHANGE UP (ref 3.8–10.5)

## 2018-07-03 PROCEDURE — C1889: CPT

## 2018-07-03 PROCEDURE — 87086 URINE CULTURE/COLONY COUNT: CPT

## 2018-07-03 PROCEDURE — 85730 THROMBOPLASTIN TIME PARTIAL: CPT

## 2018-07-03 PROCEDURE — 85027 COMPLETE CBC AUTOMATED: CPT

## 2018-07-03 PROCEDURE — P9016: CPT

## 2018-07-03 PROCEDURE — 84295 ASSAY OF SERUM SODIUM: CPT

## 2018-07-03 PROCEDURE — 86900 BLOOD TYPING SEROLOGIC ABO: CPT

## 2018-07-03 PROCEDURE — 86923 COMPATIBILITY TEST ELECTRIC: CPT

## 2018-07-03 PROCEDURE — 72170 X-RAY EXAM OF PELVIS: CPT

## 2018-07-03 PROCEDURE — 94640 AIRWAY INHALATION TREATMENT: CPT

## 2018-07-03 PROCEDURE — 83605 ASSAY OF LACTIC ACID: CPT

## 2018-07-03 PROCEDURE — 71045 X-RAY EXAM CHEST 1 VIEW: CPT

## 2018-07-03 PROCEDURE — 97161 PT EVAL LOW COMPLEX 20 MIN: CPT

## 2018-07-03 PROCEDURE — 82330 ASSAY OF CALCIUM: CPT

## 2018-07-03 PROCEDURE — 85014 HEMATOCRIT: CPT

## 2018-07-03 PROCEDURE — 82803 BLOOD GASES ANY COMBINATION: CPT

## 2018-07-03 PROCEDURE — 86901 BLOOD TYPING SEROLOGIC RH(D): CPT

## 2018-07-03 PROCEDURE — 82947 ASSAY GLUCOSE BLOOD QUANT: CPT

## 2018-07-03 PROCEDURE — 86850 RBC ANTIBODY SCREEN: CPT

## 2018-07-03 PROCEDURE — 76000 FLUOROSCOPY <1 HR PHYS/QHP: CPT

## 2018-07-03 PROCEDURE — 84132 ASSAY OF SERUM POTASSIUM: CPT

## 2018-07-03 PROCEDURE — 85610 PROTHROMBIN TIME: CPT

## 2018-07-03 PROCEDURE — 97110 THERAPEUTIC EXERCISES: CPT

## 2018-07-03 PROCEDURE — 97116 GAIT TRAINING THERAPY: CPT

## 2018-07-03 PROCEDURE — 80048 BASIC METABOLIC PNL TOTAL CA: CPT

## 2018-07-03 PROCEDURE — 93005 ELECTROCARDIOGRAM TRACING: CPT

## 2018-07-03 PROCEDURE — C1776: CPT

## 2018-07-03 PROCEDURE — 97165 OT EVAL LOW COMPLEX 30 MIN: CPT

## 2018-07-03 PROCEDURE — 82435 ASSAY OF BLOOD CHLORIDE: CPT

## 2018-07-03 RX ORDER — HYDRALAZINE HCL 50 MG
1 TABLET ORAL
Qty: 0 | Refills: 0 | COMMUNITY
Start: 2018-07-03

## 2018-07-03 RX ORDER — FOLIC ACID 0.8 MG
1 TABLET ORAL
Qty: 0 | Refills: 0 | COMMUNITY

## 2018-07-03 RX ORDER — HYDROMORPHONE HYDROCHLORIDE 2 MG/ML
3 INJECTION INTRAMUSCULAR; INTRAVENOUS; SUBCUTANEOUS
Qty: 0 | Refills: 0 | COMMUNITY
Start: 2018-07-03

## 2018-07-03 RX ORDER — ASPIRIN/CALCIUM CARB/MAGNESIUM 324 MG
1 TABLET ORAL
Qty: 0 | Refills: 0 | COMMUNITY
Start: 2018-07-03

## 2018-07-03 RX ORDER — TRAMADOL HYDROCHLORIDE 50 MG/1
1 TABLET ORAL
Qty: 0 | Refills: 0 | COMMUNITY
Start: 2018-07-03

## 2018-07-03 RX ORDER — IPRATROPIUM/ALBUTEROL SULFATE 18-103MCG
3 AEROSOL WITH ADAPTER (GRAM) INHALATION
Qty: 0 | Refills: 0 | COMMUNITY
Start: 2018-07-03

## 2018-07-03 RX ORDER — NIFEDIPINE 30 MG
1 TABLET, EXTENDED RELEASE 24 HR ORAL
Qty: 0 | Refills: 0 | COMMUNITY
Start: 2018-07-03

## 2018-07-03 RX ORDER — BUDESONIDE, MICRONIZED 100 %
0 POWDER (GRAM) MISCELLANEOUS
Qty: 0 | Refills: 0 | COMMUNITY
Start: 2018-07-03

## 2018-07-03 RX ORDER — OXYCODONE HYDROCHLORIDE 5 MG/1
1 TABLET ORAL
Qty: 0 | Refills: 0 | COMMUNITY
Start: 2018-07-03

## 2018-07-03 RX ORDER — HYDROMORPHONE HYDROCHLORIDE 2 MG/ML
1 INJECTION INTRAMUSCULAR; INTRAVENOUS; SUBCUTANEOUS
Qty: 0 | Refills: 0 | COMMUNITY
Start: 2018-07-03

## 2018-07-03 RX ORDER — NICOTINE POLACRILEX 2 MG
1 GUM BUCCAL
Qty: 0 | Refills: 0 | COMMUNITY
Start: 2018-07-03

## 2018-07-03 RX ORDER — CELECOXIB 200 MG/1
1 CAPSULE ORAL
Qty: 0 | Refills: 0 | COMMUNITY
Start: 2018-07-03

## 2018-07-03 RX ADMIN — HYDROMORPHONE HYDROCHLORIDE 6 MILLIGRAM(S): 2 INJECTION INTRAMUSCULAR; INTRAVENOUS; SUBCUTANEOUS at 03:39

## 2018-07-03 RX ADMIN — HYDROMORPHONE HYDROCHLORIDE 6 MILLIGRAM(S): 2 INJECTION INTRAMUSCULAR; INTRAVENOUS; SUBCUTANEOUS at 00:11

## 2018-07-03 RX ADMIN — CELECOXIB 200 MILLIGRAM(S): 200 CAPSULE ORAL at 12:11

## 2018-07-03 RX ADMIN — HYDROMORPHONE HYDROCHLORIDE 6 MILLIGRAM(S): 2 INJECTION INTRAMUSCULAR; INTRAVENOUS; SUBCUTANEOUS at 03:09

## 2018-07-03 RX ADMIN — HYDROMORPHONE HYDROCHLORIDE 6 MILLIGRAM(S): 2 INJECTION INTRAMUSCULAR; INTRAVENOUS; SUBCUTANEOUS at 12:08

## 2018-07-03 RX ADMIN — Medication 325 MILLIGRAM(S): at 06:02

## 2018-07-03 RX ADMIN — Medication 50 MILLIGRAM(S): at 14:23

## 2018-07-03 RX ADMIN — HYDROMORPHONE HYDROCHLORIDE 6 MILLIGRAM(S): 2 INJECTION INTRAMUSCULAR; INTRAVENOUS; SUBCUTANEOUS at 00:41

## 2018-07-03 RX ADMIN — Medication 100 MILLIGRAM(S): at 14:09

## 2018-07-03 RX ADMIN — POLYETHYLENE GLYCOL 3350 17 GRAM(S): 17 POWDER, FOR SOLUTION ORAL at 11:42

## 2018-07-03 RX ADMIN — HYDROMORPHONE HYDROCHLORIDE 6 MILLIGRAM(S): 2 INJECTION INTRAMUSCULAR; INTRAVENOUS; SUBCUTANEOUS at 06:02

## 2018-07-03 RX ADMIN — Medication 1 PATCH: at 11:41

## 2018-07-03 RX ADMIN — PANTOPRAZOLE SODIUM 40 MILLIGRAM(S): 20 TABLET, DELAYED RELEASE ORAL at 11:39

## 2018-07-03 RX ADMIN — Medication 200 MILLIGRAM(S): at 14:24

## 2018-07-03 RX ADMIN — Medication 0.5 MILLIGRAM(S): at 06:01

## 2018-07-03 RX ADMIN — TRAMADOL HYDROCHLORIDE 50 MILLIGRAM(S): 50 TABLET ORAL at 14:09

## 2018-07-03 RX ADMIN — Medication 50 MILLIGRAM(S): at 06:02

## 2018-07-03 RX ADMIN — Medication 3 MILLILITER(S): at 06:01

## 2018-07-03 RX ADMIN — HYDROMORPHONE HYDROCHLORIDE 6 MILLIGRAM(S): 2 INJECTION INTRAMUSCULAR; INTRAVENOUS; SUBCUTANEOUS at 06:32

## 2018-07-03 RX ADMIN — CELECOXIB 200 MILLIGRAM(S): 200 CAPSULE ORAL at 11:41

## 2018-07-03 RX ADMIN — Medication 200 MILLIGRAM(S): at 06:01

## 2018-07-03 RX ADMIN — Medication 3 MILLILITER(S): at 11:39

## 2018-07-03 RX ADMIN — Medication 90 MILLIGRAM(S): at 06:01

## 2018-07-03 RX ADMIN — HYDROMORPHONE HYDROCHLORIDE 6 MILLIGRAM(S): 2 INJECTION INTRAMUSCULAR; INTRAVENOUS; SUBCUTANEOUS at 11:38

## 2018-07-03 RX ADMIN — OXYCODONE HYDROCHLORIDE 10 MILLIGRAM(S): 5 TABLET ORAL at 08:42

## 2018-07-03 NOTE — DISCHARGE NOTE ADULT - CARE PLAN
Principal Discharge DX:	Periprosthetic fracture around internal prosthetic hip joint, initial encounter  Goal:	Pain relief, improvement with activities of daily living  Assessment and plan of treatment:	Call Dr. Be's office after rehab to schedule a follow up appointment. Dressings aqua cell postop day 14, other dressings change daily til dry for 48 hours. (If sutures or staples remove POD #14 apply steri strips. Physical therapy to assist with exercises and help increase endurance.

## 2018-07-03 NOTE — DISCHARGE NOTE ADULT - REASON FOR ADMISSION
Direct Admission - Transfer from [facility] for Periprosthetic R Femur Fracture

## 2018-07-03 NOTE — DISCHARGE NOTE ADULT - CARE PROVIDERS DIRECT ADDRESSES
[email or web address],DirectAddress_Unknown,DirectAddress_Unknown

## 2018-07-03 NOTE — PROGRESS NOTE ADULT - PROBLEM SELECTOR PLAN 1
TOLERATED orif RIGHT PERIPROSTHETIC  HIP SURGERY  Adequate pain meds  Incentive spirometry DVT and GI prophylaxis
TOLERATED orif RIGHT PERIPROSTHETIC  HIP SURGERY  Adequate pain meds  Incentive spirometry DVT and GI prophylaxis
***See Above  [facility]  Orthopedics  B: [id number]  S: [phone]
***See Above  [facility]  Orthopedics  B: 1409/1865  S: [phone]
TOLERATED orif RIGHT PERIPROSTHETIC  HIP SURGERY  Adequate pain meds  Incentive spirometry DVT and GI prophylaxis
TOLERATED orif RIGHT PERIPROSTHETIC  HIP SURGERY  Adequate pain meds  Incentive spirometry DVT and GI prophylaxis
Medically patient is at incensed risk for surgery due to her copd

## 2018-07-03 NOTE — PROGRESS NOTE ADULT - SUBJECTIVE AND OBJECTIVE BOX
ORTHO  Patient is a 72y old  Female who presents with a chief complaint of Direct Admission - Transfer from [facility] for Periprosthetic R Femur Fracture (28 Jun 2018 19:22)    Pt. resting without complaint    VS-  T(C): 36.8 (07-03-18 @ 04:49), Max: 37.1 (07-02-18 @ 17:02)  HR: 72 (07-03-18 @ 04:49) (71 - 78)  BP: 117/65 (07-03-18 @ 04:49) (112/51 - 121/54)  RR: 18 (07-03-18 @ 04:49) (18 - 18)  SpO2: 97% (07-03-18 @ 04:49) (95% - 99%)  Wt(kg): --    M.S.   A&O  Extremity- Right LE- dressings C/D/I  Neuro-              Motor- (+) Ankle DF/PF              Sensation- grossly intact to light touch              Calves- soft, nontender- PAS                               8.4    9.31  )-----------( 322      ( 02 Jul 2018 08:05 )             26.4     07-02    138  |  99  |  22  ----------------------------<  93  4.1   |  28  |  0.88    Ca    7.9<L>      02 Jul 2018 06:45

## 2018-07-03 NOTE — PROGRESS NOTE ADULT - PROVIDER SPECIALTY LIST ADULT
Internal Medicine
Orthopedics
Pulmonology
Internal Medicine

## 2018-07-03 NOTE — PROGRESS NOTE ADULT - ATTENDING COMMENTS
Cleared by orthopedics for discharge to rehabilitation.  Full instructions provided regarding diagnosis, treatment, discharge medications, diet and follow up care on transfer sheet. Medically stable and for discharge to rehabilitation today as planned.
Beginning to ambulate and doing well. No medical complications post-op to date and to proceed with physical therapy, as tolerated. Continues pulmonary toilet to lessen atelectasis, leg exercises as taught to lessen the risk of DVT and supervised pain medications for post-op pain control. I anticipate transfer to rehabilitation to follow when cleared by orthopedics.

## 2018-07-03 NOTE — PROGRESS NOTE ADULT - PROBLEM SELECTOR PROBLEM 1
Pain of right hip joint

## 2018-07-03 NOTE — DISCHARGE NOTE ADULT - PLAN OF CARE
Pain relief, improvement with activities of daily living Call Dr. Be's office after rehab to schedule a follow up appointment. Dressings aqua cell postop day 14, other dressings change daily til dry for 48 hours. (If sutures or staples remove POD #14 apply steri strips. Physical therapy to assist with exercises and help increase endurance.

## 2018-07-03 NOTE — DISCHARGE NOTE ADULT - MEDICATION SUMMARY - MEDICATIONS TO CHANGE
I will SWITCH the dose or number of times a day I take the medications listed below when I get home from the hospital:    HYDROmorphone 2 mg oral tablet  -- 1 tab(s) by mouth every 3 hours, As needed, Moderate Pain (4 - 6)    HYDROmorphone 4 mg oral tablet  -- 1 tab(s) by mouth every 3 hours, As needed, Severe Pain (7 - 10)

## 2018-07-03 NOTE — DISCHARGE NOTE ADULT - NS AS ACTIVITY OBS
Do not drive or operate machinery/Walking-Outdoors allowed/Stairs allowed/Do not make important decisions/No Heavy lifting/straining/Walking-Indoors allowed

## 2018-07-03 NOTE — DISCHARGE NOTE ADULT - CARE PROVIDER_API CALL
Megan Be), Orthopaedic Surgery  [address]  [address]  [place] [address]  Phone: [phone]  Fax: [phone]    [place] (), Medicine  [address] [place] [address]  Phone: [phone]  Fax: [phone]    Prashant Oreilly), Internal Medicine; Pulmonary Disease  [address] [address]  Phone: [phone]  Fax: [phone]

## 2018-07-03 NOTE — PROGRESS NOTE ADULT - PROBLEM SELECTOR PLAN 4
BP slightly elevated  No elevated  HR or talking

## 2018-07-03 NOTE — DISCHARGE NOTE ADULT - HOSPITAL COURSE
Reason for Admission: Direct Admission - Transfer from [facility] for Periprosthetic R Femur Fracture	  History of Present Illness: 	  Pt is a 71 y/o female transferred from [facility] this evening s/p mechanical fall in her kitchen a few days ago.  Pt admits to tripping over a garbage can in her kitchen and falling.  She was taken by ambulance to [facility] and subsequently transferred to [facility].  Pt is a very poor historian and exact dates of the trip and fall and transfers are not readily available.  Pt now transferred to [facility] for definitive treatment of a R periprosthetic femur fracture.        Allergies and Intolerances:        Allergies:  	No Known Allergies:     Home Medications:   * Patient Currently Takes Medications as of 28-Jun-2018 17:19 documented in Structured Notes  · 	levoFLOXacin 25 mg/mL intravenous solution: 20 milliliter(s) intravenous every 24 hours  · 	senna oral tablet: 2 tab(s) orally once a day (at bedtime)  · 	docusate sodium 100 mg oral capsule: 1 cap(s) orally 2 times a day  · 	carvedilol 12.5 mg oral tablet: 1 tab(s) orally every 12 hours  · 	gabapentin 300 mg oral capsule: 1 cap(s) orally 3 times a day  · 	heparin: 5 milligram(s) subcutaneous every 8 hours  · 	HYDROmorphone 4 mg oral tablet: 1 tab(s) orally every 3 hours, As needed, Severe Pain (7 - 10)  · 	HYDROmorphone 2 mg oral tablet: 1 tab(s) orally every 3 hours, As needed, Moderate Pain (4 - 6)  · 	traMADol 50 mg oral tablet: 1 tab(s) orally every 8 hours  · 	nicotine 21 mg/24 hr transdermal film, extended release: 1 patch transdermal once a day  · 	pantoprazole 40 mg oral delayed release tablet: 1 tab(s) orally once a day  · 	polyethylene glycol 3350 oral powder for reconstitution: 17 gram(s) orally once a day  · 	QUEtiapine 50 mg oral tablet: 3 tab(s) orally once a day (at bedtime)  · 	QUEtiapine 50 mg oral tablet: 1 tab(s) orally every 6 hours, As needed, anxiety/insomnia  · 	Os-Jai 250 with D oral tablet: 1 tab(s) orally 3 times a day  · 	folic acid 1 mg oral tablet: 1 tab(s) orally once a day  · 	thiamine 100 mg oral tablet: 1 tab(s) orally once a day    Patient History:    Past Medical History:  Alcohol abuse    Anxiety    Anxiety    CAD (coronary artery disease)    Coronary artery disease    Emphysema, unspecified    HTN (hypertension)    Hyperlipidemia    Hypertension    Migraine    Migraine    Pain of right hip joint    Seizure    Stented coronary artery.     Past Surgical History:  S/P bladder repair    Status post total hip replacement, right  5/21/18.  6/28/18- Patient was transferred to the hospital with a right periprosthetic hip fracture. Medical and Pulm cleared for surgery.  6/30/18- Patient was taken to the OR for ORIF tolerated procedure without complications.  Patient was evaluated by Physical therapy whom recommended rehab for discharge plan. Stable for discharge when cleared by Pulm MD, and bed becomes available.

## 2018-07-03 NOTE — PROGRESS NOTE ADULT - ASSESSMENT
Imp:   COPD,   recent pneumonia,   active smoker,   s/p ORIF R hip fx POD #2    Plan:    Observing off abx. CXR will represent radiographic lag.  Supplemental O2 to maintain saturation 92% PRN  Incentive spirometry.  Duoneb q6 h with budesonide via neb every 12 hours  Guaifenesin dm  Encouraging secretion clearance, pulmonary toilet prn.  Elevate HOB.  Aspiration precaution  Judicious CV/fluid management  Standard prophylactic measures  Judicious pain control  Routine post-operative care per Ortho    Giovany Dunne MD  Pulmonary Medicine  [phone]

## 2018-07-03 NOTE — PROGRESS NOTE ADULT - PROBLEM SELECTOR PROBLEM 3
Coronary artery disease

## 2018-07-03 NOTE — DISCHARGE NOTE ADULT - MEDICATION SUMMARY - MEDICATIONS TO STOP TAKING
I will STOP taking the medications listed below when I get home from the hospital:    heparin  -- 5 milligram(s) subcutaneous every 8 hours    levoFLOXacin 25 mg/mL intravenous solution  -- 20 milliliter(s) intravenous every 24 hours

## 2018-07-03 NOTE — DISCHARGE NOTE ADULT - MEDICATION SUMMARY - MEDICATIONS TO TAKE
I will START or STAY ON the medications listed below when I get home from the hospital:    budesonide 0.5 mg/2 mL inhalation suspension  --  inhaled   -- Indication: For inhaled cortical steroids    celecoxib 200 mg oral capsule  -- 1 cap(s) by mouth once a day  -- Indication: For Anti inflammatory agent    HYDROmorphone 4 mg oral tablet  -- 1 tab(s) by mouth every 3 hours, As needed, Moderate Pain (4 - 6)  -- Indication: For Pain medication    HYDROmorphone 2 mg oral tablet  -- 3 tab(s) by mouth every 3 hours, As needed, Severe Pain (7 - 10)  -- Indication: For Pain medication    oxyCODONE 10 mg oral tablet, extended release  -- 1 tab(s) by mouth every 12 hours  -- Indication: For Pain medication    traMADol 50 mg oral tablet  -- 1 tab(s) by mouth every 8 hours  -- Indication: For Pain medication    aspirin 325 mg oral delayed release tablet  -- 1 tab(s) by mouth 2 times a day  -- Indication: For Antiplatelet therapy, DVT Prophalaxis    gabapentin 300 mg oral capsule  -- 1 cap(s) by mouth 3 times a day  -- Indication: For Nerve pain    QUEtiapine 50 mg oral tablet  -- 1 tab(s) by mouth every 6 hours, As needed, anxiety/insomnia  -- Indication: For Antipsychotic agent    QUEtiapine 50 mg oral tablet  -- 3 tab(s) by mouth once a day (at bedtime)  -- Indication: For Antipsychotic agent    carvedilol 12.5 mg oral tablet  -- 1 tab(s) by mouth every 12 hours  -- Indication: For Antihypertensive agent    ipratropium-albuterol 0.5 mg-2.5 mg/3 mLinhalation solution  -- 3 milliliter(s) inhaled every 6 hours  -- Indication: For Bronchodilator agent    NIFEdipine 90 mg oral tablet, extended release  -- 1 tab(s) by mouth once a day  -- Indication: For Antihypertensive agent    guaiFENesin 100 mg/5 mL oral liquid  -- 10 milliliter(s) by mouth every 6 hours, As needed, Cough  -- Indication: For Expectorant    docusate sodium 100 mg oral capsule  -- 1 cap(s) by mouth 2 times a day  -- Indication: For Stool softener    senna oral tablet  -- 2 tab(s) by mouth once a day (at bedtime)  -- Indication: For Laxative    polyethylene glycol 3350 oral powder for reconstitution  -- 17 gram(s) by mouth once a day  -- Indication: For Laxative    pantoprazole 40 mg oral delayed release tablet  -- 1 tab(s) by mouth once a day  -- Indication: For Antidyspepsia    nicotine 21 mg/24 hr transdermal film, extended release  -- 1 patch by transdermal patch once a day  -- Indication: For Smoke cessation    hydrALAZINE 50 mg oral tablet  -- 1 tab(s) by mouth every 8 hours  -- Indication: For Antihypertensive agent    thiamine 100 mg oral tablet  -- 1 tab(s) by mouth once a day  -- Indication: For Supplement

## 2018-07-03 NOTE — PROGRESS NOTE ADULT - ASSESSMENT
Impression: Stable       Plan:   Continue present treatment                 Out of bed, ambulate, weight bearing as tolerated                  Physical therapy follow up                  Continue to monitor    Ferny Mahoney PA-C  Orthopaedic Surgery  Team pager [phone]  [phone]

## 2018-07-03 NOTE — PROGRESS NOTE ADULT - PROBLEM SELECTOR PLAN 2
incentive spirometry and bronchodilator therapy as planned

## 2018-07-03 NOTE — PROGRESS NOTE ADULT - SUBJECTIVE AND OBJECTIVE BOX
Patient is a 72y old  Female who presents with Right Periprosthetic  Femur Fracture with ORIF on 06/30/18.  Patient with severe COPD. Now  post op out of bed in a chair with no SOB,  agitation, tachycardia  or alcohol withdrawal.  She continues  to do vigorous deep breathing exercises.  Daily post op lab continues for anemia, renal function and electrolytes. Doing well post op with no medical complications to date and proceeding with non weight bearing physical therapy. Anticipate transfer to rehabilitation later today    MEDICATIONS  (STANDING):  ALBUTerol/ipratropium for Nebulization 3 milliLiter(s) Nebulizer every 6 hours  aspirin enteric coated 325 milliGRAM(s) Oral two times a day  buDESOnide   0.5 milliGRAM(s) Respule 0.5 milliGRAM(s) Inhalation two times a day  celecoxib 200 milliGRAM(s) Oral daily  docusate sodium 100 milliGRAM(s) Oral three times a day  hydrALAZINE 50 milliGRAM(s) Oral every 8 hours  labetalol 200 milliGRAM(s) Oral three times a day  lactated ringers. 1000 milliLiter(s) (100 mL/Hr) IV Continuous <Continuous>  nicotine - 21 mG/24Hr(s) Patch 1 patch Transdermal daily  NIFEdipine XL 90 milliGRAM(s) Oral daily  oxyCODONE  ER Tablet 10 milliGRAM(s) Oral every 12 hours  pantoprazole    Tablet 40 milliGRAM(s) Oral daily  polyethylene glycol 3350 17 Gram(s) Oral daily  QUEtiapine 150 milliGRAM(s) Oral at bedtime  thiamine 100 milliGRAM(s) Oral daily  traMADol 50 milliGRAM(s) Oral every 8 hours    MEDICATIONS  (PRN):  aluminum hydroxide/magnesium hydroxide/simethicone Suspension 30 milliLiter(s) Oral four times a day PRN Indigestion  bisacodyl Suppository 10 milliGRAM(s) Rectal daily PRN If no bowel movement by POD#2  guaiFENesin   Syrup  (Sugar-Free) 200 milliGRAM(s) Oral every 6 hours PRN Cough  HYDROmorphone   Tablet 4 milliGRAM(s) Oral every 3 hours PRN Moderate Pain (4 - 6)  HYDROmorphone   Tablet 6 milliGRAM(s) Oral every 3 hours PRN Severe Pain (7 - 10)  magnesium hydroxide Suspension 30 milliLiter(s) Oral daily PRN Constipation  ondansetron Injectable 4 milliGRAM(s) IV Push every 6 hours PRN Nausea and/or Vomiting  QUEtiapine 50 milliGRAM(s) Oral every 6 hours PRN Anxiety  senna 2 Tablet(s) Oral at bedtime PRN Constipation      Allergies    No Known Allergies    Vital Signs Last 24 Hrs  T(C): 36.8 (03 Jul 2018 14:54), Max: 36.9 (02 Jul 2018 21:11)  T(F): 98.3 (03 Jul 2018 14:54), Max: 98.5 (03 Jul 2018 00:24)  HR: 83 (03 Jul 2018 14:54) (72 - 83)  BP: 114/58 (03 Jul 2018 14:54) (114/58 - 120/59)  BP(mean): --  RR: 18 (03 Jul 2018 14:54) (18 - 18)  SpO2: 98% (03 Jul 2018 14:54) (95% - 98%)    PHYSICAL EXAM:  GENERAL: NAD, well nourished and conversant  HEAD:  Atraumatic  EYES: EOM, PERRLA, conjunctiva pink and sclera white  ENT: No tonsillar erythema, exudates, or enlargement, moist mucous membranes, good dentition, no lesions  NECK: Supple, No JVD, normal thyroid, carotids with normal upstrokes and no bruits  CHEST/LUNG: Clear to auscultation bilaterally, No rales, rhonchi, wheezing, or rubs  HEART: Regular rate and rhythm, No murmurs, rubs, or gallops  ABDOMEN: Soft, nondistended, no masses, guarding, tenderness or rebound, bowel sounds present  EXTREMITIES:  2+ Peripheral Pulses, No clubbing, cyanosis, or edema.   s/p repair of periprosthetic femur fracture  LYMPH: No lymphadenopathy noted  SKIN: No rashes or lesions  NERVOUS SYSTEM:  Alert & Oriented X3, normal cognitive function. Motor Strength 5/5 right upper and right lower.  5/5 left upper and left lower extremities, DTRs 2+ intact and symmetric    LABS:    CBC Full  -  ( 03 Jul 2018 09:43 )  WBC Count : 10.4 K/uL  Hemoglobin : 9.3 g/dL  Hematocrit : 28.4 %  Platelet Count - Automated : 331 K/uL  Mean Cell Volume : 99.4 fl  Mean Cell Hemoglobin : 32.6 pg  Mean Cell Hemoglobin Concentration : 32.8 gm/dL  Auto Neutrophil # : x  Auto Lymphocyte # : x  Auto Monocyte # : x  Auto Eosinophil # : x  Auto Basophil # : x  Auto Neutrophil % : x  Auto Lymphocyte % : x  Auto Monocyte % : x  Auto Eosinophil % : x  Auto Basophil % : x    07-03    137  |  98  |  28<H>  ----------------------------<  112<H>  4.1   |  27  |  0.88    Ca    8.3<L>      03 Jul 2018 09:41

## 2018-07-03 NOTE — PROGRESS NOTE ADULT - SUBJECTIVE AND OBJECTIVE BOX
Follow-up Pulm Progress Note    The patient was seen and examined. Notes reviewed and discussed with staff/team as applicable      No new respiratory events overnight. Still with post op pain.     Denies: increased SOB, Chest pain, increased cough, colored phlegm, hemoptysis, N/V/D, neck stiffness, dysuria      Vital Signs Last 24 Hrs  T(C): 36.8 (03 Jul 2018 04:49), Max: 37.1 (02 Jul 2018 17:02)  T(F): 98.3 (03 Jul 2018 04:49), Max: 98.7 (02 Jul 2018 17:02)  HR: 72 (03 Jul 2018 04:49) (71 - 78)  BP: 117/65 (03 Jul 2018 04:49) (112/51 - 121/54)  BP(mean): --  RR: 18 (03 Jul 2018 04:49) (18 - 18)  SpO2: 97% (03 Jul 2018 04:49) (95% - 99%)          07-02 @ 07:01  -  07-03 @ 07:00  --------------------------------------------------------  IN: 1860 mL / OUT: 1450 mL / NET: 410 mL          Medications:  MEDICATIONS  (STANDING):  ALBUTerol/ipratropium for Nebulization 3 milliLiter(s) Nebulizer every 6 hours  aspirin enteric coated 325 milliGRAM(s) Oral two times a day  buDESOnide   0.5 milliGRAM(s) Respule 0.5 milliGRAM(s) Inhalation two times a day  celecoxib 200 milliGRAM(s) Oral daily  docusate sodium 100 milliGRAM(s) Oral three times a day  hydrALAZINE 50 milliGRAM(s) Oral every 8 hours  labetalol 200 milliGRAM(s) Oral three times a day  lactated ringers. 1000 milliLiter(s) (100 mL/Hr) IV Continuous <Continuous>  nicotine - 21 mG/24Hr(s) Patch 1 patch Transdermal daily  NIFEdipine XL 90 milliGRAM(s) Oral daily  oxyCODONE  ER Tablet 10 milliGRAM(s) Oral every 12 hours  pantoprazole    Tablet 40 milliGRAM(s) Oral daily  polyethylene glycol 3350 17 Gram(s) Oral daily  QUEtiapine 150 milliGRAM(s) Oral at bedtime  thiamine 100 milliGRAM(s) Oral daily  traMADol 50 milliGRAM(s) Oral every 8 hours    MEDICATIONS  (PRN):  aluminum hydroxide/magnesium hydroxide/simethicone Suspension 30 milliLiter(s) Oral four times a day PRN Indigestion  bisacodyl Suppository 10 milliGRAM(s) Rectal daily PRN If no bowel movement by POD#2  guaiFENesin   Syrup  (Sugar-Free) 200 milliGRAM(s) Oral every 6 hours PRN Cough  HYDROmorphone   Tablet 4 milliGRAM(s) Oral every 3 hours PRN Moderate Pain (4 - 6)  HYDROmorphone   Tablet 6 milliGRAM(s) Oral every 3 hours PRN Severe Pain (7 - 10)  magnesium hydroxide Suspension 30 milliLiter(s) Oral daily PRN Constipation  ondansetron Injectable 4 milliGRAM(s) IV Push every 6 hours PRN Nausea and/or Vomiting  QUEtiapine 50 milliGRAM(s) Oral every 6 hours PRN Anxiety  senna 2 Tablet(s) Oral at bedtime PRN Constipation      Allergies    No Known Allergies        Physical Examination:    Pleasant female, NAD  Neck: no JVD, LAD, accessory muscle use  PULM: Few rales at bases, no wheezes  CVS: Regular rate and rhythm, S1S2, no murmurs, rubs, or gallops  Abdomen: soft, NT  Extremities: s/p surgery R. no edema  Neuro: non focal      LABS:                        8.4    9.31  )-----------( 322      ( 02 Jul 2018 08:05 )             26.4     07-02    138  |  99  |  22  ----------------------------<  93  4.1   |  28  |  0.88    Ca    7.9<L>      02 Jul 2018 06:45            CULTURES:  Culture Results:   No growth (06-29 @ 14:03)    Most recent blood culture -- 06-29 @ 14:03   -- -- .Urine Catheterized 06-29 @ 14:03  Most recent blood culture   NO ORGANISMS ISOLATED  NO ORGANISMS ISOLATED AT 96 HOURS 06-28 @ 11:02   -- -- BLOOD 06-28 @ 11:02      RADIOLOGY REVIEWED    CXR:    < from: Xray Chest 1 View- PORTABLE-Routine (07.02.18 @ 08:47) >  EXAM:  XR CHEST PORTABLE ROUTINE 1V                            PROCEDURE DATE:  07/02/2018            INTERPRETATION:  CLINICAL INFORMATION: Assess pneumonia after treatment.    TECHNIQUE: AP view of the chest.    COMPARISON: Chest x-ray 6/28/2018.    FINDINGS:     Unchanged patchy bilateral lower lobe opacities.  No pleural effusion or pneumothorax.  The heart is normal in size.  Degenerative changes of the thoracic spine.    IMPRESSION:   Unchanged patchy lower lobe opacities, unchanged sinceprevious   examination. Differential includes infection or aspiration.                DEANNA GUERRA M.D., RADIOLOGY RESIDENT  This document has been electronically signed.  FABIANA MANJARREZ M.D., ATTENDING RADIOLOGIST  This document has been electronically signed. Jul 2 2018 10:49AM                < end of copied text >

## 2018-07-08 ENCOUNTER — EMERGENCY (EMERGENCY)
Facility: HOSPITAL | Age: 73
LOS: 1 days | Discharge: HOME | End: 2018-07-08
Attending: EMERGENCY MEDICINE
Payer: MEDICARE

## 2018-07-08 VITALS
OXYGEN SATURATION: 96 % | SYSTOLIC BLOOD PRESSURE: 127 MMHG | RESPIRATION RATE: 16 BRPM | DIASTOLIC BLOOD PRESSURE: 78 MMHG | HEART RATE: 80 BPM

## 2018-07-08 DIAGNOSIS — Z96.641 PRESENCE OF RIGHT ARTIFICIAL HIP JOINT: Chronic | ICD-10-CM

## 2018-07-08 DIAGNOSIS — Z98.89 OTHER SPECIFIED POSTPROCEDURAL STATES: Chronic | ICD-10-CM

## 2018-07-08 LAB
ALBUMIN SERPL ELPH-MCNC: 3.6 G/DL — SIGNIFICANT CHANGE UP (ref 3.3–5)
ALP SERPL-CCNC: 102 U/L — SIGNIFICANT CHANGE UP (ref 40–120)
ALT FLD-CCNC: 20 U/L — SIGNIFICANT CHANGE UP (ref 10–45)
ANION GAP SERPL CALC-SCNC: 14 MMOL/L — SIGNIFICANT CHANGE UP (ref 5–17)
APTT BLD: 23.5 SEC — LOW (ref 27.5–37.4)
AST SERPL-CCNC: 29 U/L — SIGNIFICANT CHANGE UP (ref 10–40)
BASOPHILS # BLD AUTO: 0 K/UL — SIGNIFICANT CHANGE UP (ref 0–0.2)
BASOPHILS NFR BLD AUTO: 0.1 % — SIGNIFICANT CHANGE UP (ref 0–2)
BILIRUB SERPL-MCNC: 0.4 MG/DL — SIGNIFICANT CHANGE UP (ref 0.2–1.2)
BUN SERPL-MCNC: 31 MG/DL — HIGH (ref 7–23)
CALCIUM SERPL-MCNC: 8.6 MG/DL — SIGNIFICANT CHANGE UP (ref 8.4–10.5)
CHLORIDE SERPL-SCNC: 95 MMOL/L — LOW (ref 96–108)
CO2 SERPL-SCNC: 26 MMOL/L — SIGNIFICANT CHANGE UP (ref 22–31)
CREAT SERPL-MCNC: 1.01 MG/DL — SIGNIFICANT CHANGE UP (ref 0.5–1.3)
CRP SERPL-MCNC: 6.6 MG/DL — HIGH (ref 0–0.4)
EOSINOPHIL # BLD AUTO: 0.2 K/UL — SIGNIFICANT CHANGE UP (ref 0–0.5)
EOSINOPHIL NFR BLD AUTO: 2.1 % — SIGNIFICANT CHANGE UP (ref 0–6)
ERYTHROCYTE [SEDIMENTATION RATE] IN BLOOD: 114 MM/HR — HIGH (ref 0–20)
GLUCOSE SERPL-MCNC: 107 MG/DL — HIGH (ref 70–99)
HCT VFR BLD CALC: 27.9 % — LOW (ref 34.5–45)
HGB BLD-MCNC: 9.5 G/DL — LOW (ref 11.5–15.5)
INR BLD: 1.11 RATIO — SIGNIFICANT CHANGE UP (ref 0.88–1.16)
LYMPHOCYTES # BLD AUTO: 1 K/UL — SIGNIFICANT CHANGE UP (ref 1–3.3)
LYMPHOCYTES # BLD AUTO: 11.4 % — LOW (ref 13–44)
MCHC RBC-ENTMCNC: 33.5 PG — SIGNIFICANT CHANGE UP (ref 27–34)
MCHC RBC-ENTMCNC: 34.2 GM/DL — SIGNIFICANT CHANGE UP (ref 32–36)
MCV RBC AUTO: 97.8 FL — SIGNIFICANT CHANGE UP (ref 80–100)
MONOCYTES # BLD AUTO: 1 K/UL — HIGH (ref 0–0.9)
MONOCYTES NFR BLD AUTO: 11.6 % — SIGNIFICANT CHANGE UP (ref 2–14)
NEUTROPHILS # BLD AUTO: 6.7 K/UL — SIGNIFICANT CHANGE UP (ref 1.8–7.4)
NEUTROPHILS NFR BLD AUTO: 74.9 % — SIGNIFICANT CHANGE UP (ref 43–77)
PLAT MORPH BLD: NORMAL — SIGNIFICANT CHANGE UP
PLATELET # BLD AUTO: 428 K/UL — HIGH (ref 150–400)
POTASSIUM SERPL-MCNC: 5.4 MMOL/L — HIGH (ref 3.5–5.3)
POTASSIUM SERPL-SCNC: 5.4 MMOL/L — HIGH (ref 3.5–5.3)
PROT SERPL-MCNC: 8 G/DL — SIGNIFICANT CHANGE UP (ref 6–8.3)
PROTHROM AB SERPL-ACNC: 12.1 SEC — SIGNIFICANT CHANGE UP (ref 9.8–12.7)
RBC # BLD: 2.85 M/UL — LOW (ref 3.8–5.2)
RBC # FLD: 13.2 % — SIGNIFICANT CHANGE UP (ref 10.3–14.5)
RBC BLD AUTO: SIGNIFICANT CHANGE UP
SODIUM SERPL-SCNC: 135 MMOL/L — SIGNIFICANT CHANGE UP (ref 135–145)
WBC # BLD: 9.5 K/UL — SIGNIFICANT CHANGE UP (ref 3.8–10.5)
WBC # FLD AUTO: 9.5 K/UL — SIGNIFICANT CHANGE UP (ref 3.8–10.5)

## 2018-07-08 PROCEDURE — 93971 EXTREMITY STUDY: CPT | Mod: 26

## 2018-07-08 PROCEDURE — 99284 EMERGENCY DEPT VISIT MOD MDM: CPT | Mod: 25,GC

## 2018-07-08 PROCEDURE — 73502 X-RAY EXAM HIP UNI 2-3 VIEWS: CPT | Mod: 26,RT

## 2018-07-08 RX ORDER — MORPHINE SULFATE 50 MG/1
4 CAPSULE, EXTENDED RELEASE ORAL ONCE
Qty: 0 | Refills: 0 | Status: DISCONTINUED | OUTPATIENT
Start: 2018-07-08 | End: 2018-07-08

## 2018-07-08 RX ORDER — HYDROMORPHONE HYDROCHLORIDE 2 MG/ML
4 INJECTION INTRAMUSCULAR; INTRAVENOUS; SUBCUTANEOUS ONCE
Qty: 0 | Refills: 0 | Status: DISCONTINUED | OUTPATIENT
Start: 2018-07-08 | End: 2018-07-08

## 2018-07-08 RX ADMIN — HYDROMORPHONE HYDROCHLORIDE 4 MILLIGRAM(S): 2 INJECTION INTRAMUSCULAR; INTRAVENOUS; SUBCUTANEOUS at 23:16

## 2018-07-08 RX ADMIN — MORPHINE SULFATE 4 MILLIGRAM(S): 50 CAPSULE, EXTENDED RELEASE ORAL at 22:36

## 2018-07-08 RX ADMIN — MORPHINE SULFATE 4 MILLIGRAM(S): 50 CAPSULE, EXTENDED RELEASE ORAL at 22:02

## 2018-07-08 NOTE — ED PROVIDER NOTE - CARE PLAN
Principal Discharge DX:	Hip pain, acute, right Principal Discharge DX:	Hip pain, acute, right  Assessment and plan of treatment:	You were evaluated for right leg pain. You had labs done, as well as x-rays, DVT study, and you were evaluated by ortho. No acute findings were found. Please continue with your pain regimen and try to comply with physical therapy.  If the swelling increases, you experience numbness/ tingling in the leg, it starts changing color, becomes cold, you develop shortness of breath/chest pain, or for any other emergent concern, please come back to the emergency room.

## 2018-07-08 NOTE — CONSULT NOTE ADULT - SUBJECTIVE AND OBJECTIVE BOX
72y Female s/p revision USAMA with ORIF for periprosthetic fracture on 6/30/18, recently discharged on 7/3/18 to rehab. patient was sent from rehab to ED for evaluation of right hip pain. no history of trauma or fall. no fevers or chills, no drainage from the wound some mild lower extremity swelling. patient is ambulating with a walker but this morning had more pain than normal. Denies numbness/tingling. Denies fever/chills. Denies pain/injury elsewhere.     PAST MEDICAL & SURGICAL HISTORY:  Pain of right hip joint  Migraine  Alcohol abuse  Seizure  Stented coronary artery  Coronary artery disease  Anxiety  HTN (hypertension)  Emphysema, unspecified  Anxiety  Migraine  CAD (coronary artery disease)  Hyperlipidemia  Hypertension  Status post total hip replacement, right: 5/21/18  S/P bladder repair    MEDICATIONS  (STANDING):    Allergies    No Known Allergies    Intolerances                              9.5    9.5   )-----------( 428      ( 08 Jul 2018 22:11 )             27.9     08 Jul 2018 22:11    135    |  95     |  31     ----------------------------<  107    5.4     |  26     |  1.01     Ca    8.6        08 Jul 2018 22:11    TPro  8.0    /  Alb  3.6    /  TBili  0.4    /  DBili  x      /  AST  29     /  ALT  20     /  AlkPhos  102    08 Jul 2018 22:11    PT/INR - ( 08 Jul 2018 22:11 )   PT: 12.1 sec;   INR: 1.11 ratio         PTT - ( 08 Jul 2018 22:11 )  PTT:23.5 sec  Vital Signs Last 24 Hrs  T(C): 36.9 (07-08-18 @ 21:39), Max: 36.9 (07-08-18 @ 21:39)  T(F): 98.4 (07-08-18 @ 21:39), Max: 98.4 (07-08-18 @ 21:39)  HR: 77 (07-08-18 @ 23:17) (77 - 80)  BP: 112/60 (07-08-18 @ 23:17) (112/60 - 136/68)  BP(mean): --  RR: 18 (07-08-18 @ 23:17) (16 - 18)  SpO2: 98% (07-08-18 @ 23:17) (96% - 98%)    Imaging: XR were personally reviewed and demonstrates intact hardware revision USAMA, no acute periprosthetic fractures    Physical Exam  Gen: NAD  RLE: incision with stables clean dry and intact, no drainage from the wound, nothing could be expressed from the wound, mild erythema around the incision, thigh soft and compresable, no leg LD, able to SLR, neg log roll, +ehl/fhl/ta/gs function, no calf ttp, dp/pt pulse intact, compartments soft    Secondary survey: benign, nv intact, able to SLR contralateral leg, negative log roll contralateral leg, no bony ttp elsewhere, bilateral upper extremity skin intact with no gross deformity, non tender to palpation over bony prominences, neurovascularly intact    A/P: 72y Female sp revision USAMA on 6/30/18, returned to ED from rehab today for eval of right hip pain, no drainage no sign of infection, doppler study negative, xray hardware intact no acute fractures, pain medicine in ED improved pain  Pain control  WBAT   no acute ortho intervention at this time  follow up at POD 14 with Dr. Be as planned  return to ED if any sign of infection or worsening pain  ortho stable for DC

## 2018-07-08 NOTE — ED PROVIDER NOTE - MEDICAL DECISION MAKING DETAILS
dereck - pt with recetn rorif hip sent in for eval for worsenig pain - area minimal erythem ano feefr- no dischagew - ortho consult - low suspicon for infecetion paramjit post op pain -- analgesia pocus r/o dvt and reeval

## 2018-07-08 NOTE — ED PROVIDER NOTE - PROGRESS NOTE DETAILS
SHANNON Castillo MD : pt reassessed, pain improved. ortho ok w current sed rate. additional verbal instructions regarding diagnosis, return precautions and follow up plan given to pt and/or family. Maggie: Patient expresses desire to go home. Need to obtain femur xray and wait for CRp and then if CRp <100, will send back to [facility]. Maggie: Spoke to ortho who went through patient's xray. No acute findings seen. Will send pt back to [facility]. Maggie: Patient continues to yell out that she needs help. Got out of bed. Did not fall. Put her back to bed.

## 2018-07-08 NOTE — ED PROVIDER NOTE - FAMILY HISTORY
Child  Still living? No  Family history of coronary artery disease in daughter, Age at diagnosis: Age Unknown

## 2018-07-08 NOTE — ED PROVIDER NOTE - PHYSICAL EXAMINATION
dereck - aaox3 nad ncatperrl eomi s1s2 rrrctabl abd soft nt rt hip surg site sci no dc ttp minmal erythem a no calf ttp good distal pulses sentaion intact compartments soft no streaking redness

## 2018-07-08 NOTE — ED PROVIDER NOTE - PLAN OF CARE
You were evaluated for right leg pain. You had labs done, as well as x-rays, DVT study, and you were evaluated by ortho. No acute findings were found. Please continue with your pain regimen and try to comply with physical therapy.  If the swelling increases, you experience numbness/ tingling in the leg, it starts changing color, becomes cold, you develop shortness of breath/chest pain, or for any other emergent concern, please come back to the emergency room.

## 2018-07-08 NOTE — ED ADULT NURSE NOTE - OBJECTIVE STATEMENT
pt BIBA from [facility] and as per EMS, pt "had a fall at home and had a fx of her right femur. She had surgery to repair it and was d/c to [facility] on 7/3/18. Today pt was unable to walk and has some right leg swelling." pt Aox3 speaking in full complete sentences at present. pt denies any fevers/chills, numbness/tingling, chest pain, SOB, n/v/d, abd. pain or any urinary problems at present. surgical site dressed at present. pt right leg warm to touch but pt afebrile at present. pt received 6mg PO dilaudid at 1830 PTA. +2 edema noted to right leg.

## 2018-07-08 NOTE — ED PROVIDER NOTE - OBJECTIVE STATEMENT
72 year old F PMH HTN, CAD, EtOH abuse, Emphysema, anxiety and R fem neck fracture s/p CRPP on 10/17, and s/p 06/30/2018  ORIF Rt periprosthetic femur fracture sent from [facility] for increasing swelling, redness and pain of the RLE. Patient denies numbness and tingling. Reports dilaudid "does not work" for the pain, "but morphine does." denies chest pain, shortness of breath, nausea and vomiting. Has not been walking on the leg 2/2 pain.

## 2018-07-09 VITALS
OXYGEN SATURATION: 99 % | TEMPERATURE: 98 F | SYSTOLIC BLOOD PRESSURE: 118 MMHG | DIASTOLIC BLOOD PRESSURE: 69 MMHG | HEART RATE: 77 BPM | RESPIRATION RATE: 18 BRPM

## 2018-07-09 PROCEDURE — 85027 COMPLETE CBC AUTOMATED: CPT

## 2018-07-09 PROCEDURE — 99284 EMERGENCY DEPT VISIT MOD MDM: CPT | Mod: 25

## 2018-07-09 PROCEDURE — 85610 PROTHROMBIN TIME: CPT

## 2018-07-09 PROCEDURE — 73552 X-RAY EXAM OF FEMUR 2/>: CPT

## 2018-07-09 PROCEDURE — 85652 RBC SED RATE AUTOMATED: CPT

## 2018-07-09 PROCEDURE — 80053 COMPREHEN METABOLIC PANEL: CPT

## 2018-07-09 PROCEDURE — 73552 X-RAY EXAM OF FEMUR 2/>: CPT | Mod: 26,RT

## 2018-07-09 PROCEDURE — 86140 C-REACTIVE PROTEIN: CPT

## 2018-07-09 PROCEDURE — 96374 THER/PROPH/DIAG INJ IV PUSH: CPT

## 2018-07-09 PROCEDURE — 85730 THROMBOPLASTIN TIME PARTIAL: CPT

## 2018-07-09 PROCEDURE — 73502 X-RAY EXAM HIP UNI 2-3 VIEWS: CPT

## 2018-07-09 PROCEDURE — 93971 EXTREMITY STUDY: CPT

## 2018-07-09 RX ORDER — QUETIAPINE FUMARATE 200 MG/1
50 TABLET, FILM COATED ORAL ONCE
Qty: 0 | Refills: 0 | Status: COMPLETED | OUTPATIENT
Start: 2018-07-09 | End: 2018-07-09

## 2018-07-09 RX ADMIN — QUETIAPINE FUMARATE 50 MILLIGRAM(S): 200 TABLET, FILM COATED ORAL at 01:22

## 2018-07-09 RX ADMIN — HYDROMORPHONE HYDROCHLORIDE 4 MILLIGRAM(S): 2 INJECTION INTRAMUSCULAR; INTRAVENOUS; SUBCUTANEOUS at 00:12

## 2018-07-09 NOTE — ED ADULT NURSE REASSESSMENT NOTE - NS ED NURSE REASSESS COMMENT FT1
pt noted to be resting comfortably in bed. upon primary RN entering pt room, pt begins yelling "Im in terrible pain. I want to get out of here." pt awaiting nonemergent transport to [facility].
pt resting comfortably in bed at present. absence on nonverbal indicators of pain at present. pt placed on bedpan.

## 2018-07-09 NOTE — ED ADULT NURSE REASSESSMENT NOTE - GENERAL PATIENT STATE
cooperative/no change observed
resting/sleeping/comfortable appearance

## 2018-07-16 ENCOUNTER — INPATIENT (INPATIENT)
Facility: HOSPITAL | Age: 73
LOS: 7 days | Discharge: NURSING, REHAB OR LONG-TERM CARE | DRG: 481 | End: 2018-07-24
Attending: ORTHOPAEDIC SURGERY | Admitting: ORTHOPAEDIC SURGERY
Payer: MEDICARE

## 2018-07-16 VITALS
RESPIRATION RATE: 14 BRPM | DIASTOLIC BLOOD PRESSURE: 61 MMHG | OXYGEN SATURATION: 95 % | HEART RATE: 67 BPM | SYSTOLIC BLOOD PRESSURE: 121 MMHG

## 2018-07-16 DIAGNOSIS — S72.009A FRACTURE OF UNSPECIFIED PART OF NECK OF UNSPECIFIED FEMUR, INITIAL ENCOUNTER FOR CLOSED FRACTURE: ICD-10-CM

## 2018-07-16 DIAGNOSIS — I10 ESSENTIAL (PRIMARY) HYPERTENSION: ICD-10-CM

## 2018-07-16 DIAGNOSIS — Z98.89 OTHER SPECIFIED POSTPROCEDURAL STATES: Chronic | ICD-10-CM

## 2018-07-16 DIAGNOSIS — Z96.641 PRESENCE OF RIGHT ARTIFICIAL HIP JOINT: Chronic | ICD-10-CM

## 2018-07-16 DIAGNOSIS — J43.9 EMPHYSEMA, UNSPECIFIED: ICD-10-CM

## 2018-07-16 DIAGNOSIS — I25.10 ATHEROSCLEROTIC HEART DISEASE OF NATIVE CORONARY ARTERY WITHOUT ANGINA PECTORIS: ICD-10-CM

## 2018-07-16 PROBLEM — M25.551 PAIN IN RIGHT HIP: Chronic | Status: ACTIVE | Noted: 2017-10-31

## 2018-07-16 LAB
ALBUMIN SERPL ELPH-MCNC: 3.1 G/DL — LOW (ref 3.3–5)
ALP SERPL-CCNC: 106 U/L — SIGNIFICANT CHANGE UP (ref 40–120)
ALT FLD-CCNC: 10 U/L — SIGNIFICANT CHANGE UP (ref 10–45)
ANION GAP SERPL CALC-SCNC: 14 MMOL/L — SIGNIFICANT CHANGE UP (ref 5–17)
APTT BLD: 30.5 SEC — SIGNIFICANT CHANGE UP (ref 27.5–37.4)
AST SERPL-CCNC: 10 U/L — SIGNIFICANT CHANGE UP (ref 10–40)
BASE EXCESS BLDV CALC-SCNC: 2 MMOL/L — SIGNIFICANT CHANGE UP (ref -2–2)
BASOPHILS # BLD AUTO: 0 K/UL — SIGNIFICANT CHANGE UP (ref 0–0.2)
BASOPHILS NFR BLD AUTO: 0.2 % — SIGNIFICANT CHANGE UP (ref 0–2)
BILIRUB SERPL-MCNC: 0.3 MG/DL — SIGNIFICANT CHANGE UP (ref 0.2–1.2)
BLD GP AB SCN SERPL QL: NEGATIVE — SIGNIFICANT CHANGE UP
BUN SERPL-MCNC: 28 MG/DL — HIGH (ref 7–23)
CA-I SERPL-SCNC: 1.15 MMOL/L — SIGNIFICANT CHANGE UP (ref 1.12–1.3)
CALCIUM SERPL-MCNC: 8.2 MG/DL — LOW (ref 8.4–10.5)
CHLORIDE BLDV-SCNC: 104 MMOL/L — SIGNIFICANT CHANGE UP (ref 96–108)
CHLORIDE SERPL-SCNC: 100 MMOL/L — SIGNIFICANT CHANGE UP (ref 96–108)
CO2 BLDV-SCNC: 29 MMOL/L — SIGNIFICANT CHANGE UP (ref 22–30)
CO2 SERPL-SCNC: 25 MMOL/L — SIGNIFICANT CHANGE UP (ref 22–31)
CREAT SERPL-MCNC: 1.01 MG/DL — SIGNIFICANT CHANGE UP (ref 0.5–1.3)
CRP SERPL-MCNC: 4.5 MG/DL — HIGH (ref 0–0.4)
EOSINOPHIL # BLD AUTO: 0.1 K/UL — SIGNIFICANT CHANGE UP (ref 0–0.5)
EOSINOPHIL NFR BLD AUTO: 2 % — SIGNIFICANT CHANGE UP (ref 0–6)
ERYTHROCYTE [SEDIMENTATION RATE] IN BLOOD: 116 MM/HR — HIGH (ref 0–20)
GAS PNL BLDV: 135 MMOL/L — LOW (ref 136–145)
GAS PNL BLDV: SIGNIFICANT CHANGE UP
GAS PNL BLDV: SIGNIFICANT CHANGE UP
GLUCOSE BLDV-MCNC: 103 MG/DL — HIGH (ref 70–99)
GLUCOSE SERPL-MCNC: 118 MG/DL — HIGH (ref 70–99)
HCO3 BLDV-SCNC: 28 MMOL/L — SIGNIFICANT CHANGE UP (ref 21–29)
HCT VFR BLD CALC: 25.6 % — LOW (ref 34.5–45)
HCT VFR BLDA CALC: 26 % — LOW (ref 39–50)
HGB BLD CALC-MCNC: 8.2 G/DL — LOW (ref 11.5–15.5)
HGB BLD-MCNC: 8.5 G/DL — LOW (ref 11.5–15.5)
INR BLD: 1.13 RATIO — SIGNIFICANT CHANGE UP (ref 0.88–1.16)
LACTATE BLDV-MCNC: 1.2 MMOL/L — SIGNIFICANT CHANGE UP (ref 0.7–2)
LYMPHOCYTES # BLD AUTO: 0.7 K/UL — LOW (ref 1–3.3)
LYMPHOCYTES # BLD AUTO: 11.7 % — LOW (ref 13–44)
MCHC RBC-ENTMCNC: 32.4 PG — SIGNIFICANT CHANGE UP (ref 27–34)
MCHC RBC-ENTMCNC: 33.3 GM/DL — SIGNIFICANT CHANGE UP (ref 32–36)
MCV RBC AUTO: 97.4 FL — SIGNIFICANT CHANGE UP (ref 80–100)
MONOCYTES # BLD AUTO: 0.7 K/UL — SIGNIFICANT CHANGE UP (ref 0–0.9)
MONOCYTES NFR BLD AUTO: 11.2 % — SIGNIFICANT CHANGE UP (ref 2–14)
NEUTROPHILS # BLD AUTO: 4.6 K/UL — SIGNIFICANT CHANGE UP (ref 1.8–7.4)
NEUTROPHILS NFR BLD AUTO: 74.9 % — SIGNIFICANT CHANGE UP (ref 43–77)
PCO2 BLDV: 53 MMHG — HIGH (ref 35–50)
PH BLDV: 7.34 — LOW (ref 7.35–7.45)
PLATELET # BLD AUTO: 376 K/UL — SIGNIFICANT CHANGE UP (ref 150–400)
PO2 BLDV: 31 MMHG — SIGNIFICANT CHANGE UP (ref 25–45)
POTASSIUM BLDV-SCNC: 4.4 MMOL/L — SIGNIFICANT CHANGE UP (ref 3.5–5.3)
POTASSIUM SERPL-MCNC: 4.6 MMOL/L — SIGNIFICANT CHANGE UP (ref 3.5–5.3)
POTASSIUM SERPL-SCNC: 4.6 MMOL/L — SIGNIFICANT CHANGE UP (ref 3.5–5.3)
PROT SERPL-MCNC: 7.4 G/DL — SIGNIFICANT CHANGE UP (ref 6–8.3)
PROTHROM AB SERPL-ACNC: 12.4 SEC — SIGNIFICANT CHANGE UP (ref 9.8–12.7)
RBC # BLD: 2.63 M/UL — LOW (ref 3.8–5.2)
RBC # FLD: 13.3 % — SIGNIFICANT CHANGE UP (ref 10.3–14.5)
RH IG SCN BLD-IMP: POSITIVE — SIGNIFICANT CHANGE UP
SAO2 % BLDV: 45 % — LOW (ref 67–88)
SODIUM SERPL-SCNC: 139 MMOL/L — SIGNIFICANT CHANGE UP (ref 135–145)
WBC # BLD: 6.1 K/UL — SIGNIFICANT CHANGE UP (ref 3.8–10.5)
WBC # FLD AUTO: 6.1 K/UL — SIGNIFICANT CHANGE UP (ref 3.8–10.5)

## 2018-07-16 PROCEDURE — 72193 CT PELVIS W/DYE: CPT | Mod: 26

## 2018-07-16 PROCEDURE — 73502 X-RAY EXAM HIP UNI 2-3 VIEWS: CPT | Mod: 26,RT

## 2018-07-16 PROCEDURE — 99285 EMERGENCY DEPT VISIT HI MDM: CPT

## 2018-07-16 PROCEDURE — 73552 X-RAY EXAM OF FEMUR 2/>: CPT | Mod: 26,RT

## 2018-07-16 PROCEDURE — 76377 3D RENDER W/INTRP POSTPROCES: CPT | Mod: 26

## 2018-07-16 PROCEDURE — 71045 X-RAY EXAM CHEST 1 VIEW: CPT | Mod: 26

## 2018-07-16 RX ORDER — NICOTINE POLACRILEX 2 MG
1 GUM BUCCAL DAILY
Qty: 0 | Refills: 0 | Status: DISCONTINUED | OUTPATIENT
Start: 2018-07-16 | End: 2018-07-19

## 2018-07-16 RX ORDER — OXYCODONE HYDROCHLORIDE 5 MG/1
5 TABLET ORAL EVERY 4 HOURS
Qty: 0 | Refills: 0 | Status: DISCONTINUED | OUTPATIENT
Start: 2018-07-16 | End: 2018-07-17

## 2018-07-16 RX ORDER — DIPHENHYDRAMINE HCL 50 MG
25 CAPSULE ORAL AT BEDTIME
Qty: 0 | Refills: 0 | Status: DISCONTINUED | OUTPATIENT
Start: 2018-07-16 | End: 2018-07-19

## 2018-07-16 RX ORDER — ACETAMINOPHEN 500 MG
1000 TABLET ORAL ONCE
Qty: 0 | Refills: 0 | Status: COMPLETED | OUTPATIENT
Start: 2018-07-16 | End: 2018-07-17

## 2018-07-16 RX ORDER — DOCUSATE SODIUM 100 MG
100 CAPSULE ORAL THREE TIMES A DAY
Qty: 0 | Refills: 0 | Status: DISCONTINUED | OUTPATIENT
Start: 2018-07-16 | End: 2018-07-19

## 2018-07-16 RX ORDER — HYDROMORPHONE HYDROCHLORIDE 2 MG/ML
0.5 INJECTION INTRAMUSCULAR; INTRAVENOUS; SUBCUTANEOUS EVERY 4 HOURS
Qty: 0 | Refills: 0 | Status: DISCONTINUED | OUTPATIENT
Start: 2018-07-16 | End: 2018-07-16

## 2018-07-16 RX ORDER — GABAPENTIN 400 MG/1
300 CAPSULE ORAL THREE TIMES A DAY
Qty: 0 | Refills: 0 | Status: DISCONTINUED | OUTPATIENT
Start: 2018-07-16 | End: 2018-07-19

## 2018-07-16 RX ORDER — THIAMINE MONONITRATE (VIT B1) 100 MG
100 TABLET ORAL DAILY
Qty: 0 | Refills: 0 | Status: DISCONTINUED | OUTPATIENT
Start: 2018-07-16 | End: 2018-07-19

## 2018-07-16 RX ORDER — HYDROMORPHONE HYDROCHLORIDE 2 MG/ML
1 INJECTION INTRAMUSCULAR; INTRAVENOUS; SUBCUTANEOUS
Qty: 0 | Refills: 0 | Status: DISCONTINUED | OUTPATIENT
Start: 2018-07-16 | End: 2018-07-17

## 2018-07-16 RX ORDER — BUDESONIDE, MICRONIZED 100 %
0.5 POWDER (GRAM) MISCELLANEOUS
Qty: 0 | Refills: 0 | Status: DISCONTINUED | OUTPATIENT
Start: 2018-07-16 | End: 2018-07-19

## 2018-07-16 RX ORDER — OXYCODONE HYDROCHLORIDE 5 MG/1
10 TABLET ORAL EVERY 4 HOURS
Qty: 0 | Refills: 0 | Status: DISCONTINUED | OUTPATIENT
Start: 2018-07-16 | End: 2018-07-17

## 2018-07-16 RX ORDER — TIOTROPIUM BROMIDE 18 UG/1
1 CAPSULE ORAL; RESPIRATORY (INHALATION) DAILY
Qty: 0 | Refills: 0 | Status: DISCONTINUED | OUTPATIENT
Start: 2018-07-16 | End: 2018-07-19

## 2018-07-16 RX ORDER — PANTOPRAZOLE SODIUM 20 MG/1
40 TABLET, DELAYED RELEASE ORAL
Qty: 0 | Refills: 0 | Status: DISCONTINUED | OUTPATIENT
Start: 2018-07-16 | End: 2018-07-19

## 2018-07-16 RX ORDER — MORPHINE SULFATE 50 MG/1
4 CAPSULE, EXTENDED RELEASE ORAL ONCE
Qty: 0 | Refills: 0 | Status: DISCONTINUED | OUTPATIENT
Start: 2018-07-16 | End: 2018-07-16

## 2018-07-16 RX ORDER — QUETIAPINE FUMARATE 200 MG/1
50 TABLET, FILM COATED ORAL EVERY 6 HOURS
Qty: 0 | Refills: 0 | Status: DISCONTINUED | OUTPATIENT
Start: 2018-07-16 | End: 2018-07-18

## 2018-07-16 RX ORDER — HYDRALAZINE HCL 50 MG
50 TABLET ORAL EVERY 8 HOURS
Qty: 0 | Refills: 0 | Status: DISCONTINUED | OUTPATIENT
Start: 2018-07-16 | End: 2018-07-19

## 2018-07-16 RX ORDER — ACETAMINOPHEN 500 MG
650 TABLET ORAL EVERY 6 HOURS
Qty: 0 | Refills: 0 | Status: DISCONTINUED | OUTPATIENT
Start: 2018-07-16 | End: 2018-07-19

## 2018-07-16 RX ORDER — ALBUTEROL 90 UG/1
2 AEROSOL, METERED ORAL EVERY 6 HOURS
Qty: 0 | Refills: 0 | Status: DISCONTINUED | OUTPATIENT
Start: 2018-07-16 | End: 2018-07-19

## 2018-07-16 RX ORDER — ONDANSETRON 8 MG/1
4 TABLET, FILM COATED ORAL EVERY 4 HOURS
Qty: 0 | Refills: 0 | Status: DISCONTINUED | OUTPATIENT
Start: 2018-07-16 | End: 2018-07-19

## 2018-07-16 RX ORDER — NIFEDIPINE 30 MG
90 TABLET, EXTENDED RELEASE 24 HR ORAL DAILY
Qty: 0 | Refills: 0 | Status: DISCONTINUED | OUTPATIENT
Start: 2018-07-16 | End: 2018-07-19

## 2018-07-16 RX ORDER — SENNA PLUS 8.6 MG/1
2 TABLET ORAL AT BEDTIME
Qty: 0 | Refills: 0 | Status: DISCONTINUED | OUTPATIENT
Start: 2018-07-16 | End: 2018-07-19

## 2018-07-16 RX ORDER — CARVEDILOL PHOSPHATE 80 MG/1
12.5 CAPSULE, EXTENDED RELEASE ORAL EVERY 12 HOURS
Qty: 0 | Refills: 0 | Status: DISCONTINUED | OUTPATIENT
Start: 2018-07-16 | End: 2018-07-19

## 2018-07-16 RX ORDER — MORPHINE SULFATE 50 MG/1
2 CAPSULE, EXTENDED RELEASE ORAL ONCE
Qty: 0 | Refills: 0 | Status: DISCONTINUED | OUTPATIENT
Start: 2018-07-16 | End: 2018-07-16

## 2018-07-16 RX ORDER — BUDESONIDE, MICRONIZED 100 %
0.5 POWDER (GRAM) MISCELLANEOUS DAILY
Qty: 0 | Refills: 0 | Status: DISCONTINUED | OUTPATIENT
Start: 2018-07-16 | End: 2018-07-16

## 2018-07-16 RX ORDER — QUETIAPINE FUMARATE 200 MG/1
50 TABLET, FILM COATED ORAL AT BEDTIME
Qty: 0 | Refills: 0 | Status: DISCONTINUED | OUTPATIENT
Start: 2018-07-16 | End: 2018-07-18

## 2018-07-16 RX ORDER — HEPARIN SODIUM 5000 [USP'U]/ML
5000 INJECTION INTRAVENOUS; SUBCUTANEOUS EVERY 8 HOURS
Qty: 0 | Refills: 0 | Status: DISCONTINUED | OUTPATIENT
Start: 2018-07-16 | End: 2018-07-18

## 2018-07-16 RX ADMIN — MORPHINE SULFATE 2 MILLIGRAM(S): 50 CAPSULE, EXTENDED RELEASE ORAL at 19:20

## 2018-07-16 RX ADMIN — HYDROMORPHONE HYDROCHLORIDE 0.5 MILLIGRAM(S): 2 INJECTION INTRAMUSCULAR; INTRAVENOUS; SUBCUTANEOUS at 23:36

## 2018-07-16 RX ADMIN — MORPHINE SULFATE 2 MILLIGRAM(S): 50 CAPSULE, EXTENDED RELEASE ORAL at 18:04

## 2018-07-16 RX ADMIN — HEPARIN SODIUM 5000 UNIT(S): 5000 INJECTION INTRAVENOUS; SUBCUTANEOUS at 23:08

## 2018-07-16 RX ADMIN — MORPHINE SULFATE 4 MILLIGRAM(S): 50 CAPSULE, EXTENDED RELEASE ORAL at 13:49

## 2018-07-16 RX ADMIN — MORPHINE SULFATE 4 MILLIGRAM(S): 50 CAPSULE, EXTENDED RELEASE ORAL at 12:31

## 2018-07-16 NOTE — H&P ADULT - NSHPPHYSICALEXAM_GEN_ALL_CORE
Gen: NAD, AAOx3  RLE: Healing incision over R hip, no active drainage, unable to express anything from wound, +erythema around incision, thigh soft/compressible, +EHL/FHL/TA/GS, SILT, unable to SLR, negative log roll, +dp pulse intact, compartments soft/compressible    Secondary Survey: No TTP over bony prominences, SILT, palpable pulses, full/painless range of motion, compartments soft, able to SLR contralateral extremity/neg log roll

## 2018-07-16 NOTE — ED CLERICAL - NS ED CLERK NOTE PRE-ARRIVAL INFORMATION; ADDITIONAL PRE-ARRIVAL INFORMATION
This patient is enrolled in the comprehensive joint replacement (CJR) program and has active care navigation.  This patient can be followed up by the care navigation team within 24 hours. To arrange close follow-up or to obtain additional clinical information about this patient, please call the contact number above. Please call the orthopedic resident ([phone]) for ALL patients who are admitted or placed in observation.

## 2018-07-16 NOTE — H&P ADULT - HISTORY OF PRESENT ILLNESS
72F presents from [facility] rehab c/o R hip pain. Pt states she has been unable to ambulate for the past few days. Denies any new falls/trauma. Pt had revision R USAMA on 6/30/18 by Dr. Be for a periprosthetic R femur fracture. Pt was having continued pain which significantly worsened a few days ago. Denies fever/chills. Denies numbness/tingling. No other complaints at this time.

## 2018-07-16 NOTE — ED PROVIDER NOTE - ATTENDING CONTRIBUTION TO CARE
Bariatric surgery I performed a history and physical exam of the patient and discussed their management with the Advanced Care Practitioner. I reviewed the ACP's note and agree with the documented findings and plan of care. My medical decision making and observations are found above.

## 2018-07-16 NOTE — CONSULT NOTE ADULT - ASSESSMENT
73yo woman s/p recent revision of a right THR returs with a loose bone fragment and inabilityt o ambulate. scheduled for revision of THR. Patient has a long hx of COPD after smoking for many years. tolerated her preious surgery well. Denies any chest pain. states she has some shortness of breath with ambulation from the COPD

## 2018-07-16 NOTE — CONSULT NOTE ADULT - PROBLEM SELECTOR RECOMMENDATION 9
scheduled for revision of right hip replacement   No contraindication to scheduled procedure.   pain meds as needed  DVT and GI prophylaxis  physical therapy post op

## 2018-07-16 NOTE — CONSULT NOTE ADULT - SUBJECTIVE AND OBJECTIVE BOX
72F presents from [facility] re[facility] c/o R hip pain. Pt states she has been unable to ambulate for the past few days. Denies any new falls/trauma. Pt had revision R USAMA on 6/30/18 by Dr. Be for a periprosthetic R femur fracture. Pt was having continued pain which significantly worsened a few days ago. Patient found to have a dislodge bone fragment from her previous surgery. Patient is scheduled for revision of her periprosthetic hip fx.      PAST MEDICAL & SURGICAL HISTORY:  Pain of right hip joint  Migraine  Alcohol abuse  Seizure  Stented coronary artery  Coronary artery disease  Anxiety  HTN (hypertension)  Emphysema, unspecified  Anxiety  Migraine  CAD (coronary artery disease)  Hyperlipidemia  Hypertension  Status post total hip replacement, right: 5/21/18  S/P bladder repair        MEDICATIONS  (STANDING):  ALBUTerol    90 MICROgram(s) HFA Inhaler 2 Puff(s) Inhalation every 6 hours  buDESOnide   0.5 milliGRAM(s) Respule 0.5 milliGRAM(s) Inhalation two times a day  carvedilol 12.5 milliGRAM(s) Oral every 12 hours  docusate sodium 100 milliGRAM(s) Oral three times a day  gabapentin 300 milliGRAM(s) Oral three times a day  heparin  Injectable 5000 Unit(s) SubCutaneous every 8 hours  hydrALAZINE 50 milliGRAM(s) Oral every 8 hours  multivitamin 1 Tablet(s) Oral daily  nicotine - 21 mG/24Hr(s) Patch 1 patch Transdermal daily  NIFEdipine XL 90 milliGRAM(s) Oral daily  pantoprazole    Tablet 40 milliGRAM(s) Oral before breakfast  QUEtiapine 50 milliGRAM(s) Oral at bedtime  senna 2 Tablet(s) Oral at bedtime  thiamine 100 milliGRAM(s) Oral daily  tiotropium 18 MICROgram(s) Capsule 1 Capsule(s) Inhalation daily    MEDICATIONS  (PRN):  acetaminophen   Tablet 650 milliGRAM(s) Oral every 6 hours PRN For Temp greater than 38 C (100.4 F)  acetaminophen   Tablet. 650 milliGRAM(s) Oral every 6 hours PRN Headache  diphenhydrAMINE   Capsule 25 milliGRAM(s) Oral at bedtime PRN Insomnia  HYDROmorphone  Injectable 0.5 milliGRAM(s) IV Push every 4 hours PRN Severe Pain (7 - 10)  ondansetron Injectable 4 milliGRAM(s) IV Push every 4 hours PRN Nausea and/or Vomiting  oxyCODONE    IR 10 milliGRAM(s) Oral every 4 hours PRN Moderate Pain  oxyCODONE    IR 5 milliGRAM(s) Oral every 4 hours PRN Mild Pain  QUEtiapine 50 milliGRAM(s) Oral every 6 hours PRN anxiety/insomnia    SOC HX:  Tobacco: long hx of tobacco use  ETOH: NEG  Drugs: Neg    Family Hx:  as per my discussion with the Patient non contributory      CONSTITUTIONAL: No weakness, fevers or chills  EYES/ENT: No visual changes;  No vertigo or throat pain   NECK: No pain or stiffness  RESPIRATORY: No cough, wheezing, hemoptysis; No shortness of breath  CARDIOVASCULAR: No chest pain or palpitations  GASTROINTESTINAL: No abdominal or epigastric pain. No nausea, vomiting, or hematemesis; No diarrhea or constipation. No melena or hematochezia.  GENITOURINARY: No dysuria, frequency or hematuria  NEUROLOGICAL: No numbness or weakness  SKIN: No itching, burning, rashes, or lesions   MUSCULOSKELETAL: Right hip pain    INTERVAL HPI/OVERNIGHT EVENTS:  T(C): 36.8 (07-16-18 @ 22:47), Max: 36.9 (07-16-18 @ 18:26)  HR: 82 (07-16-18 @ 22:47) (64 - 95)  BP: 145/80 (07-16-18 @ 22:47) (117/53 - 145/80)  RR: 16 (07-16-18 @ 22:47) (14 - 18)  SpO2: 100% (07-16-18 @ 22:47) (94% - 100%)  Wt(kg): --  I&O's Summary      PHYSICAL EXAM:  GENERAL: NAD, well-groomed, well-developed  HEAD:  Atraumatic, Normocephalic  EYES: EOMI, PERRLA, conjunctiva and sclera clear  ENMT: No tonsillar erythema, exudates, or enlargement; Moist mucous membranes, Good dentition, No lesions  NECK: Supple, No JVD, Normal thyroid  NERVOUS SYSTEM:  Alert & Oriented X3, Good concentration; Motor Strength 5/5 B/L upper and lower extremities; DTRs 2+ intact and symmetric  CHEST/LUNG: decreased breath sounds B/L  HEART: Regular rate and rhythm; No murmurs, rubs, or gallops  ABDOMEN: Soft, Nontender, Nondistended; Bowel sounds present  EXTREMITIES:  2+ Peripheral Pulses, No clubbing, cyanosis, or edema  LYMPH: No lymphadenopathy noted  SKIN: No rashes or lesions        LABS:                        8.5    6.1   )-----------( 376      ( 16 Jul 2018 12:46 )             25.6     07-16    139  |  100  |  28<H>  ----------------------------<  118<H>  4.6   |  25  |  1.01    Ca    8.2<L>      16 Jul 2018 12:46    TPro  7.4  /  Alb  3.1<L>  /  TBili  0.3  /  DBili  x   /  AST  10  /  ALT  10  /  AlkPhos  106  07-16    PT/INR - ( 16 Jul 2018 12:46 )   PT: 12.4 sec;   INR: 1.13 ratio         PTT - ( 16 Jul 2018 12:46 )  PTT:30.5 sec    CAPILLARY BLOOD GLUCOSE    EKG: NSR@ 63 LAE            Radiology reports:

## 2018-07-16 NOTE — H&P ADULT - ASSESSMENT
72F s/p R revision USAMA on 6/30, now with displacement of previous fracture fragments  Admit to ortho  Pain control  DVT ppx  NWB RLE, bedrest  Medical optimization for OR  Plan for OR on Thursday with Dr. Cornell for ORIF R hip  Discussed with attending

## 2018-07-16 NOTE — ED PROVIDER NOTE - OBJECTIVE STATEMENT
72 year old F PMH HTN, CAD, EtOH abuse, Emphysema, anxiety and R fem neck fracture s/p CRPP on 10/17, and s/p 06/30/2018  ORIF Rt periprosthetic femur fracture sent from [facility] for increasing swelling, redness and pain of the RLE.  She was in the ED on July 8 for the same reason and was discharged to [facility] rehab on Keflex for cellulitis at the post op wound site. Patient sent to ED today for suspected prosthetic hip infection vs malunion for CT scan by Dr Wing. Currently still complaining of severe right hip pain and nonhealing wound despite Keflex. She denies fever or chills

## 2018-07-16 NOTE — ED PROVIDER NOTE - CARE PLAN
Principal Discharge DX:	Hip fracture Principal Discharge DX:	Other closed fracture of right femur, unspecified portion of femur, initial encounter  Goal:	proximal periprosthetic fracture

## 2018-07-16 NOTE — ED ADULT NURSE NOTE - OBJECTIVE STATEMENT
Pt bib EMS from [facility] for eval of increased pain, erythema and swelling at site of recent hip replacement surgery.  No known fevers.  No active drainage from site

## 2018-07-16 NOTE — ED PROVIDER NOTE - MEDICAL DECISION MAKING DETAILS
Sam: 72 year old female with right periprosthetic femur fracture from [facility] for hip infection/malunion versus cellulitis. will get labs, xray, CT, ortho consult, reassess, likely admission.

## 2018-07-17 PROCEDURE — 93970 EXTREMITY STUDY: CPT | Mod: 26

## 2018-07-17 PROCEDURE — 73502 X-RAY EXAM HIP UNI 2-3 VIEWS: CPT | Mod: 26,RT

## 2018-07-17 PROCEDURE — 73552 X-RAY EXAM OF FEMUR 2/>: CPT | Mod: 26,RT

## 2018-07-17 RX ORDER — HYDROMORPHONE HYDROCHLORIDE 2 MG/ML
2 INJECTION INTRAMUSCULAR; INTRAVENOUS; SUBCUTANEOUS
Qty: 0 | Refills: 0 | Status: DISCONTINUED | OUTPATIENT
Start: 2018-07-17 | End: 2018-07-19

## 2018-07-17 RX ORDER — HYDROMORPHONE HYDROCHLORIDE 2 MG/ML
1 INJECTION INTRAMUSCULAR; INTRAVENOUS; SUBCUTANEOUS EVERY 4 HOURS
Qty: 0 | Refills: 0 | Status: DISCONTINUED | OUTPATIENT
Start: 2018-07-17 | End: 2018-07-19

## 2018-07-17 RX ORDER — ACETAMINOPHEN 500 MG
1000 TABLET ORAL ONCE
Qty: 0 | Refills: 0 | Status: COMPLETED | OUTPATIENT
Start: 2018-07-17 | End: 2018-07-17

## 2018-07-17 RX ORDER — HYDROMORPHONE HYDROCHLORIDE 2 MG/ML
4 INJECTION INTRAMUSCULAR; INTRAVENOUS; SUBCUTANEOUS
Qty: 0 | Refills: 0 | Status: DISCONTINUED | OUTPATIENT
Start: 2018-07-17 | End: 2018-07-19

## 2018-07-17 RX ORDER — ACETAMINOPHEN 500 MG
1000 TABLET ORAL ONCE
Qty: 0 | Refills: 0 | Status: DISCONTINUED | OUTPATIENT
Start: 2018-07-17 | End: 2018-07-19

## 2018-07-17 RX ADMIN — TIOTROPIUM BROMIDE 1 CAPSULE(S): 18 CAPSULE ORAL; RESPIRATORY (INHALATION) at 12:31

## 2018-07-17 RX ADMIN — HEPARIN SODIUM 5000 UNIT(S): 5000 INJECTION INTRAVENOUS; SUBCUTANEOUS at 22:22

## 2018-07-17 RX ADMIN — HEPARIN SODIUM 5000 UNIT(S): 5000 INJECTION INTRAVENOUS; SUBCUTANEOUS at 05:07

## 2018-07-17 RX ADMIN — OXYCODONE HYDROCHLORIDE 10 MILLIGRAM(S): 5 TABLET ORAL at 10:06

## 2018-07-17 RX ADMIN — HYDROMORPHONE HYDROCHLORIDE 1 MILLIGRAM(S): 2 INJECTION INTRAMUSCULAR; INTRAVENOUS; SUBCUTANEOUS at 12:45

## 2018-07-17 RX ADMIN — Medication 1 TABLET(S): at 12:26

## 2018-07-17 RX ADMIN — OXYCODONE HYDROCHLORIDE 10 MILLIGRAM(S): 5 TABLET ORAL at 15:11

## 2018-07-17 RX ADMIN — QUETIAPINE FUMARATE 50 MILLIGRAM(S): 200 TABLET, FILM COATED ORAL at 14:43

## 2018-07-17 RX ADMIN — PANTOPRAZOLE SODIUM 40 MILLIGRAM(S): 20 TABLET, DELAYED RELEASE ORAL at 05:08

## 2018-07-17 RX ADMIN — ALBUTEROL 2 PUFF(S): 90 AEROSOL, METERED ORAL at 12:28

## 2018-07-17 RX ADMIN — HYDROMORPHONE HYDROCHLORIDE 0.5 MILLIGRAM(S): 2 INJECTION INTRAMUSCULAR; INTRAVENOUS; SUBCUTANEOUS at 00:06

## 2018-07-17 RX ADMIN — QUETIAPINE FUMARATE 50 MILLIGRAM(S): 200 TABLET, FILM COATED ORAL at 22:22

## 2018-07-17 RX ADMIN — Medication 400 MILLIGRAM(S): at 07:30

## 2018-07-17 RX ADMIN — HYDROMORPHONE HYDROCHLORIDE 1 MILLIGRAM(S): 2 INJECTION INTRAMUSCULAR; INTRAVENOUS; SUBCUTANEOUS at 23:23

## 2018-07-17 RX ADMIN — HYDROMORPHONE HYDROCHLORIDE 1 MILLIGRAM(S): 2 INJECTION INTRAMUSCULAR; INTRAVENOUS; SUBCUTANEOUS at 05:08

## 2018-07-17 RX ADMIN — SENNA PLUS 2 TABLET(S): 8.6 TABLET ORAL at 22:22

## 2018-07-17 RX ADMIN — Medication 50 MILLIGRAM(S): at 14:43

## 2018-07-17 RX ADMIN — CARVEDILOL PHOSPHATE 12.5 MILLIGRAM(S): 80 CAPSULE, EXTENDED RELEASE ORAL at 05:07

## 2018-07-17 RX ADMIN — OXYCODONE HYDROCHLORIDE 10 MILLIGRAM(S): 5 TABLET ORAL at 14:41

## 2018-07-17 RX ADMIN — HYDROMORPHONE HYDROCHLORIDE 1 MILLIGRAM(S): 2 INJECTION INTRAMUSCULAR; INTRAVENOUS; SUBCUTANEOUS at 08:37

## 2018-07-17 RX ADMIN — Medication 100 MILLIGRAM(S): at 12:26

## 2018-07-17 RX ADMIN — QUETIAPINE FUMARATE 50 MILLIGRAM(S): 200 TABLET, FILM COATED ORAL at 00:20

## 2018-07-17 RX ADMIN — Medication 1000 MILLIGRAM(S): at 07:45

## 2018-07-17 RX ADMIN — HYDROMORPHONE HYDROCHLORIDE 1 MILLIGRAM(S): 2 INJECTION INTRAMUSCULAR; INTRAVENOUS; SUBCUTANEOUS at 20:23

## 2018-07-17 RX ADMIN — HYDROMORPHONE HYDROCHLORIDE 1 MILLIGRAM(S): 2 INJECTION INTRAMUSCULAR; INTRAVENOUS; SUBCUTANEOUS at 20:38

## 2018-07-17 RX ADMIN — HYDROMORPHONE HYDROCHLORIDE 1 MILLIGRAM(S): 2 INJECTION INTRAMUSCULAR; INTRAVENOUS; SUBCUTANEOUS at 08:52

## 2018-07-17 RX ADMIN — Medication 0.5 MILLIGRAM(S): at 17:11

## 2018-07-17 RX ADMIN — HYDROMORPHONE HYDROCHLORIDE 1 MILLIGRAM(S): 2 INJECTION INTRAMUSCULAR; INTRAVENOUS; SUBCUTANEOUS at 17:36

## 2018-07-17 RX ADMIN — Medication 400 MILLIGRAM(S): at 00:30

## 2018-07-17 RX ADMIN — GABAPENTIN 300 MILLIGRAM(S): 400 CAPSULE ORAL at 22:22

## 2018-07-17 RX ADMIN — GABAPENTIN 300 MILLIGRAM(S): 400 CAPSULE ORAL at 00:19

## 2018-07-17 RX ADMIN — HYDROMORPHONE HYDROCHLORIDE 1 MILLIGRAM(S): 2 INJECTION INTRAMUSCULAR; INTRAVENOUS; SUBCUTANEOUS at 17:11

## 2018-07-17 RX ADMIN — Medication 90 MILLIGRAM(S): at 05:08

## 2018-07-17 RX ADMIN — OXYCODONE HYDROCHLORIDE 10 MILLIGRAM(S): 5 TABLET ORAL at 09:36

## 2018-07-17 RX ADMIN — HYDROMORPHONE HYDROCHLORIDE 1 MILLIGRAM(S): 2 INJECTION INTRAMUSCULAR; INTRAVENOUS; SUBCUTANEOUS at 23:38

## 2018-07-17 RX ADMIN — Medication 1 PATCH: at 12:27

## 2018-07-17 RX ADMIN — Medication 100 MILLIGRAM(S): at 22:22

## 2018-07-17 RX ADMIN — QUETIAPINE FUMARATE 50 MILLIGRAM(S): 200 TABLET, FILM COATED ORAL at 07:33

## 2018-07-17 RX ADMIN — GABAPENTIN 300 MILLIGRAM(S): 400 CAPSULE ORAL at 14:42

## 2018-07-17 RX ADMIN — Medication 50 MILLIGRAM(S): at 22:22

## 2018-07-17 RX ADMIN — CARVEDILOL PHOSPHATE 12.5 MILLIGRAM(S): 80 CAPSULE, EXTENDED RELEASE ORAL at 17:11

## 2018-07-17 RX ADMIN — HYDROMORPHONE HYDROCHLORIDE 1 MILLIGRAM(S): 2 INJECTION INTRAMUSCULAR; INTRAVENOUS; SUBCUTANEOUS at 12:28

## 2018-07-17 RX ADMIN — GABAPENTIN 300 MILLIGRAM(S): 400 CAPSULE ORAL at 05:08

## 2018-07-17 RX ADMIN — HEPARIN SODIUM 5000 UNIT(S): 5000 INJECTION INTRAVENOUS; SUBCUTANEOUS at 14:42

## 2018-07-17 NOTE — PROGRESS NOTE ADULT - SUBJECTIVE AND OBJECTIVE BOX
Pt seen/examined. Doing well. Pain controlled. No acute overnight complaints or events.    T(C): 36.8 (07-17-18 @ 05:06), Max: 37.1 (07-17-18 @ 00:42)  HR: 72 (07-17-18 @ 05:06) (64 - 95)  BP: 152/67 (07-17-18 @ 05:06) (117/53 - 152/67)  RR: 16 (07-17-18 @ 05:06) (14 - 18)  SpO2: 96% (07-17-18 @ 05:06) (94% - 100%)  Wt(kg): --    - Gen: NAD  - RLE: Incision C/D/I; SILT TN/SPN/DPN/SN; TA?EHL/gs intact    72yFemale w/R PP USAMA fx    - Pain control  - DVT ppx  - NWB RLE  - OR 7/19

## 2018-07-17 NOTE — PROGRESS NOTE ADULT - SUBJECTIVE AND OBJECTIVE BOX
Patient is a 72y old  Female who presents with a chief complaint of Right hip pain (16 Jul 2018 19:35)        MEDICATIONS  (STANDING):  ALBUTerol    90 MICROgram(s) HFA Inhaler 2 Puff(s) Inhalation every 6 hours  buDESOnide   0.5 milliGRAM(s) Respule 0.5 milliGRAM(s) Inhalation two times a day  carvedilol 12.5 milliGRAM(s) Oral every 12 hours  docusate sodium 100 milliGRAM(s) Oral three times a day  gabapentin 300 milliGRAM(s) Oral three times a day  heparin  Injectable 5000 Unit(s) SubCutaneous every 8 hours  hydrALAZINE 50 milliGRAM(s) Oral every 8 hours  multivitamin 1 Tablet(s) Oral daily  nicotine - 21 mG/24Hr(s) Patch 1 patch Transdermal daily  NIFEdipine XL 90 milliGRAM(s) Oral daily  pantoprazole    Tablet 40 milliGRAM(s) Oral before breakfast  QUEtiapine 50 milliGRAM(s) Oral at bedtime  senna 2 Tablet(s) Oral at bedtime  thiamine 100 milliGRAM(s) Oral daily  tiotropium 18 MICROgram(s) Capsule 1 Capsule(s) Inhalation daily    MEDICATIONS  (PRN):  acetaminophen   Tablet 650 milliGRAM(s) Oral every 6 hours PRN For Temp greater than 38 C (100.4 F)  acetaminophen   Tablet. 650 milliGRAM(s) Oral every 6 hours PRN Headache  diphenhydrAMINE   Capsule 25 milliGRAM(s) Oral at bedtime PRN Insomnia  HYDROmorphone  Injectable 1 milliGRAM(s) IV Push every 3 hours PRN Severe Pain (7 - 10)  LORazepam     Tablet 0.5 milliGRAM(s) Oral every 4 hours PRN Anxiety  ondansetron Injectable 4 milliGRAM(s) IV Push every 4 hours PRN Nausea and/or Vomiting  oxyCODONE    IR 10 milliGRAM(s) Oral every 4 hours PRN Moderate Pain  oxyCODONE    IR 5 milliGRAM(s) Oral every 4 hours PRN Mild Pain  QUEtiapine 50 milliGRAM(s) Oral every 6 hours PRN anxiety/insomnia      Allergies    No Known Allergies    Intolerances      REVIEW OF SYSTEMS:  CONSTITUTIONAL: No fever, No change in weight, No fatigue  HEAD: No headache, No dizziness, No recent trauma  EYES: No eye pain, No visual disturbances, No discharge  ENT:  No difficulty hearing, No tinnitus, No vertigo, No sinus pain, No throat pain  NECK: No pain, No stiffness  BREASTS: No pain, No masses, No nipple discharge  RESPIRATORY: No cough, No wheezing, No chills, No hemoptysis, No shortness of breath at rest or exertional shortness of breath  CARDIOVASCULAR: No chest pain, No palpitations, No dizziness, No CHF, No arrhythmia, No cardiomegaly, No leg swelling  GASTROINTESTINAL: No abdominal, No epigastric pain. No nausea, No vomiting, No hematemesis, No diarrhea, No constipation. No melena, No hematochezia, No GERD  GENITOURINARY: No dysuria, No frequency, No hematuria, No incontinence, No nocturia, No hesitancy  SKIN: No itching, No burning, No rashes, No lesions   LYMPH NODES: No history of enlarged glands  ENDOCRINE: No heat or cold intolerance, No hair loss. No osteoporosis, No thyroid disease  MUSCULOSKELETAL: No joint pain or swelling, No muscle, back, or extremity pain  PSYCHIATRIC: No depression, No anxiety, No mood swings, No difficulty sleeping  HEME/LYMPH: No easy bruising, No anticoagulants, No bleeding disorder, No bleeding gums  ALLERGY AND IMMUNOLOGIC: No hives, No eczema  NEUROLOGICAL: No memory loss, No loss of strength, No numbness, No tremors  VITALS:   T(C): 36.7 (07-17-18 @ 17:09), Max: 37.1 (07-17-18 @ 00:42)  HR: 74 (07-17-18 @ 17:09) (68 - 82)  BP: 147/64 (07-17-18 @ 17:09) (118/78 - 152/67)  RR: 18 (07-17-18 @ 17:09) (16 - 18)  SpO2: 93% (07-17-18 @ 17:09) (93% - 100%)  Wt(kg): --    PHYSICAL EXAM:  GENERAL: NAD, well nourished and conversant  HEAD:  Atraumatic  EYES: EOM, PERRLA, conjunctiva pink and sclera white  ENT: No tonsillar erythema, exudates, or enlargement, moist mucous membranes, good dentition, no lesions  NECK: Supple, No JVD, normal thyroid, carotids with normal upstrokes and no bruits  CHEST/LUNG: Clear to auscultation bilaterally, No rales, rhonchi, wheezing, or rubs  HEART: Regular rate and rhythm, No murmurs, rubs, or gallops  ABDOMEN: Soft, nondistended, no masses, guarding, tenderness or rebound, bowel sounds present  EXTREMITIES:  2+ Peripheral Pulses, No clubbing, cyanosis, or edema. No arthritis of shoulders, elbows, hands, hips, knees, ankles, or feet. No DJD C spine, T spine, or L/S spine  LYMPH: No lymphadenopathy noted  SKIN: No rashes or lesions  NERVOUS SYSTEM:  Alert & Oriented X3, normal cognitive function. Motor Strength 5/5 right upper and right lower.  5/5 left upper and left lower extremities, DTRs 2+ intact and symmetric    LABS:        CBC Full  -  ( 16 Jul 2018 12:46 )  WBC Count : 6.1 K/uL  Hemoglobin : 8.5 g/dL  Hematocrit : 25.6 %  Platelet Count - Automated : 376 K/uL  Mean Cell Volume : 97.4 fl  Mean Cell Hemoglobin : 32.4 pg  Mean Cell Hemoglobin Concentration : 33.3 gm/dL  Auto Neutrophil # : 4.6 K/uL  Auto Lymphocyte # : 0.7 K/uL  Auto Monocyte # : 0.7 K/uL  Auto Eosinophil # : 0.1 K/uL  Auto Basophil # : 0.0 K/uL  Auto Neutrophil % : 74.9 %  Auto Lymphocyte % : 11.7 %  Auto Monocyte % : 11.2 %  Auto Eosinophil % : 2.0 %  Auto Basophil % : 0.2 %    07-16    139  |  100  |  28<H>  ----------------------------<  118<H>  4.6   |  25  |  1.01    Ca    8.2<L>      16 Jul 2018 12:46    TPro  7.4  /  Alb  3.1<L>  /  TBili  0.3  /  DBili  x   /  AST  10  /  ALT  10  /  AlkPhos  106  07-16    LIVER FUNCTIONS - ( 16 Jul 2018 12:46 )  Alb: 3.1 g/dL / Pro: 7.4 g/dL / ALK PHOS: 106 U/L / ALT: 10 U/L / AST: 10 U/L / GGT: x           PT/INR - ( 16 Jul 2018 12:46 )   PT: 12.4 sec;   INR: 1.13 ratio         PTT - ( 16 Jul 2018 12:46 )  PTT:30.5 sec    CAPILLARY BLOOD GLUCOSE          RADIOLOGY & ADDITIONAL TESTS:      Consultant(s):    Care Discussed with Consultants/Other Providers [ ] YES  [ ] NO Patient is a 72y old  Female who presents with a chief complaint of Right hip pain (16 Jul 2018 19:35)        MEDICATIONS  (STANDING):  ALBUTerol    90 MICROgram(s) HFA Inhaler 2 Puff(s) Inhalation every 6 hours  buDESOnide   0.5 milliGRAM(s) Respule 0.5 milliGRAM(s) Inhalation two times a day  carvedilol 12.5 milliGRAM(s) Oral every 12 hours  docusate sodium 100 milliGRAM(s) Oral three times a day  gabapentin 300 milliGRAM(s) Oral three times a day  heparin  Injectable 5000 Unit(s) SubCutaneous every 8 hours  hydrALAZINE 50 milliGRAM(s) Oral every 8 hours  multivitamin 1 Tablet(s) Oral daily  nicotine - 21 mG/24Hr(s) Patch 1 patch Transdermal daily  NIFEdipine XL 90 milliGRAM(s) Oral daily  pantoprazole    Tablet 40 milliGRAM(s) Oral before breakfast  QUEtiapine 50 milliGRAM(s) Oral at bedtime  senna 2 Tablet(s) Oral at bedtime  thiamine 100 milliGRAM(s) Oral daily  tiotropium 18 MICROgram(s) Capsule 1 Capsule(s) Inhalation daily    MEDICATIONS  (PRN):  acetaminophen   Tablet 650 milliGRAM(s) Oral every 6 hours PRN For Temp greater than 38 C (100.4 F)  acetaminophen   Tablet. 650 milliGRAM(s) Oral every 6 hours PRN Headache  diphenhydrAMINE   Capsule 25 milliGRAM(s) Oral at bedtime PRN Insomnia  HYDROmorphone  Injectable 1 milliGRAM(s) IV Push every 3 hours PRN Severe Pain (7 - 10)  LORazepam     Tablet 0.5 milliGRAM(s) Oral every 4 hours PRN Anxiety  ondansetron Injectable 4 milliGRAM(s) IV Push every 4 hours PRN Nausea and/or Vomiting  oxyCODONE    IR 10 milliGRAM(s) Oral every 4 hours PRN Moderate Pain  oxyCODONE    IR 5 milliGRAM(s) Oral every 4 hours PRN Mild Pain  QUEtiapine 50 milliGRAM(s) Oral every 6 hours PRN anxiety/insomnia      Allergies    No Known Allergies    VITALS:   T(C): 36.7 (07-17-18 @ 17:09), Max: 37.1 (07-17-18 @ 00:42)  HR: 74 (07-17-18 @ 17:09) (68 - 82)  BP: 147/64 (07-17-18 @ 17:09) (118/78 - 152/67)  RR: 18 (07-17-18 @ 17:09) (16 - 18)  SpO2: 93% (07-17-18 @ 17:09) (93% - 100%)  Wt(kg): --    PHYSICAL EXAM:  GENERAL: NAD, well nourished and conversant  HEAD:  Atraumatic  EYES: EOM, PERRLA, conjunctiva pink and sclera white  ENT: No tonsillar erythema, exudates, or enlargement, moist mucous membranes, good dentition, no lesions  NECK: Supple, No JVD, normal thyroid, carotids with normal upstrokes and no bruits  CHEST/LUNG: Clear to auscultation bilaterally, No rales, rhonchi, wheezing, or rubs  HEART: Regular rate and rhythm, No murmurs, rubs, or gallops  ABDOMEN: Soft, nondistended, no masses, guarding, tenderness or rebound, bowel sounds present  EXTREMITIES:  2+ Peripheral Pulses, No clubbing, cyanosis, or edema. No arthritis of shoulders, elbows, hands, hips, knees, ankles, or feet. No DJD C spine, T spine, or L/S spine  LYMPH: No lymphadenopathy noted  SKIN: No rashes or lesions  NERVOUS SYSTEM:  Alert & Oriented X3, normal cognitive function. Motor Strength 5/5 right upper and right lower.  5/5 left upper and left lower extremities, DTRs 2+ intact and symmetric    LABS:        CBC Full  -  ( 16 Jul 2018 12:46 )  WBC Count : 6.1 K/uL  Hemoglobin : 8.5 g/dL  Hematocrit : 25.6 %  Platelet Count - Automated : 376 K/uL  Mean Cell Volume : 97.4 fl  Mean Cell Hemoglobin : 32.4 pg  Mean Cell Hemoglobin Concentration : 33.3 gm/dL  Auto Neutrophil # : 4.6 K/uL  Auto Lymphocyte # : 0.7 K/uL  Auto Monocyte # : 0.7 K/uL  Auto Eosinophil # : 0.1 K/uL  Auto Basophil # : 0.0 K/uL  Auto Neutrophil % : 74.9 %  Auto Lymphocyte % : 11.7 %  Auto Monocyte % : 11.2 %  Auto Eosinophil % : 2.0 %  Auto Basophil % : 0.2 %    07-16    139  |  100  |  28<H>  ----------------------------<  118<H>  4.6   |  25  |  1.01    Ca    8.2<L>      16 Jul 2018 12:46    TPro  7.4  /  Alb  3.1<L>  /  TBili  0.3  /  DBili  x   /  AST  10  /  ALT  10  /  AlkPhos  106  07-16    LIVER FUNCTIONS - ( 16 Jul 2018 12:46 )  Alb: 3.1 g/dL / Pro: 7.4 g/dL / ALK PHOS: 106 U/L / ALT: 10 U/L / AST: 10 U/L / GGT: x           PT/INR - ( 16 Jul 2018 12:46 )   PT: 12.4 sec;   INR: 1.13 ratio         PTT - ( 16 Jul 2018 12:46 )  PTT:30.5 sec    CAPILLARY BLOOD GLUCOSE          RADIOLOGY & ADDITIONAL TESTS:      Consultant(s):    Care Discussed with Consultants/Other Providers [ ] YES  [ ] NO Patient is a 72y old  Female who presents with a chief complaint of Right hip pain. [facility]. Pt states she has been unable to ambulate for the past few days. Denies any new falls/trauma. Pt had revision R USAMA on 6/30/18 by Dr. Be for a periprosthetic R femur fracture. Pt was having continued pain which significantly worsened a few days ago. Denies fever/chills. Denies numbness/tingling. No other complaints at this time. X-rays show a nonunion  requiring a revision of the periprosthetic fracture repair.  The procedure is scheduled for July 20, 2018.  In addition to moderate pain, the patient has severe anxiety. Her daughter is at the bedside providing her with support.            MEDICATIONS  (STANDING):  ALBUTerol    90 MICROgram(s) HFA Inhaler 2 Puff(s) Inhalation every 6 hours  buDESOnide   0.5 milliGRAM(s) Respule 0.5 milliGRAM(s) Inhalation two times a day  carvedilol 12.5 milliGRAM(s) Oral every 12 hours  docusate sodium 100 milliGRAM(s) Oral three times a day  gabapentin 300 milliGRAM(s) Oral three times a day  heparin  Injectable 5000 Unit(s) SubCutaneous every 8 hours  hydrALAZINE 50 milliGRAM(s) Oral every 8 hours  multivitamin 1 Tablet(s) Oral daily  nicotine - 21 mG/24Hr(s) Patch 1 patch Transdermal daily  NIFEdipine XL 90 milliGRAM(s) Oral daily  pantoprazole    Tablet 40 milliGRAM(s) Oral before breakfast  QUEtiapine 50 milliGRAM(s) Oral at bedtime  senna 2 Tablet(s) Oral at bedtime  thiamine 100 milliGRAM(s) Oral daily  tiotropium 18 MICROgram(s) Capsule 1 Capsule(s) Inhalation daily    MEDICATIONS  (PRN):  acetaminophen   Tablet 650 milliGRAM(s) Oral every 6 hours PRN For Temp greater than 38 C (100.4 F)  acetaminophen   Tablet. 650 milliGRAM(s) Oral every 6 hours PRN Headache  diphenhydrAMINE   Capsule 25 milliGRAM(s) Oral at bedtime PRN Insomnia  HYDROmorphone  Injectable 1 milliGRAM(s) IV Push every 3 hours PRN Severe Pain (7 - 10)  LORazepam     Tablet 0.5 milliGRAM(s) Oral every 4 hours PRN Anxiety  ondansetron Injectable 4 milliGRAM(s) IV Push every 4 hours PRN Nausea and/or Vomiting  oxyCODONE    IR 10 milliGRAM(s) Oral every 4 hours PRN Moderate Pain  oxyCODONE    IR 5 milliGRAM(s) Oral every 4 hours PRN Mild Pain  QUEtiapine 50 milliGRAM(s) Oral every 6 hours PRN anxiety/insomnia      Allergies    No Known Allergies    VITALS:   T(C): 36.7 (07-17-18 @ 17:09), Max: 37.1 (07-17-18 @ 00:42)  HR: 74 (07-17-18 @ 17:09) (68 - 82)  BP: 147/64 (07-17-18 @ 17:09) (118/78 - 152/67)  RR: 18 (07-17-18 @ 17:09) (16 - 18)  SpO2: 93% (07-17-18 @ 17:09) (93% - 100%)  Wt(kg): --    PHYSICAL EXAM:  GENERAL: NAD, well nourished and conversant  HEAD:  Atraumatic  EYES: EOM, PERRLA, conjunctiva pink and sclera white  ENT: No tonsillar erythema, exudates, or enlargement, moist mucous membranes, good dentition, no lesions  NECK: Supple, No JVD, normal thyroid, carotids with normal upstrokes and no bruits  CHEST/LUNG: Clear to auscultation bilaterally, No rales, rhonchi, wheezing, or rubs  HEART: Regular rate and rhythm, No murmurs, rubs, or gallops  ABDOMEN: Soft, nondistended, no masses, guarding, tenderness or rebound, bowel sounds present  EXTREMITIES:  2+ Peripheral Pulses, No clubbing, cyanosis, or edema. No arthritis of shoulders, elbows, hands, hips, knees, ankles, or feet. No DJD C spine, T spine, or L/S spine  LYMPH: No lymphadenopathy noted  SKIN: No rashes or lesions  NERVOUS SYSTEM:  Alert & Oriented X3, normal cognitive function. Motor Strength 5/5 right upper and right lower.  5/5 left upper and left lower extremities, DTRs 2+ intact and symmetric    LABS:        CBC Full  -  ( 16 Jul 2018 12:46 )  WBC Count : 6.1 K/uL  Hemoglobin : 8.5 g/dL  Hematocrit : 25.6 %  Platelet Count - Automated : 376 K/uL  Mean Cell Volume : 97.4 fl  Mean Cell Hemoglobin : 32.4 pg  Mean Cell Hemoglobin Concentration : 33.3 gm/dL  Auto Neutrophil # : 4.6 K/uL  Auto Lymphocyte # : 0.7 K/uL  Auto Monocyte # : 0.7 K/uL  Auto Eosinophil # : 0.1 K/uL  Auto Basophil # : 0.0 K/uL  Auto Neutrophil % : 74.9 %  Auto Lymphocyte % : 11.7 %  Auto Monocyte % : 11.2 %  Auto Eosinophil % : 2.0 %  Auto Basophil % : 0.2 %    07-16    139  |  100  |  28<H>  ----------------------------<  118<H>  4.6   |  25  |  1.01    Ca    8.2<L>      16 Jul 2018 12:46    TPro  7.4  /  Alb  3.1<L>  /  TBili  0.3  /  DBili  x   /  AST  10  /  ALT  10  /  AlkPhos  106  07-16    LIVER FUNCTIONS - ( 16 Jul 2018 12:46 )  Alb: 3.1 g/dL / Pro: 7.4 g/dL / ALK PHOS: 106 U/L / ALT: 10 U/L / AST: 10 U/L / GGT: x           PT/INR - ( 16 Jul 2018 12:46 )   PT: 12.4 sec;   INR: 1.13 ratio         PTT - ( 16 Jul 2018 12:46 )  PTT:30.5 sec    CAPILLARY BLOOD GLUCOSE          RADIOLOGY & ADDITIONAL TESTS:      Consultant(s):    Care Discussed with Consultants/Other Providers [ ] YES  [ ] NO

## 2018-07-18 ENCOUNTER — TRANSCRIPTION ENCOUNTER (OUTPATIENT)
Age: 73
End: 2018-07-18

## 2018-07-18 LAB
ANION GAP SERPL CALC-SCNC: 11 MMOL/L — SIGNIFICANT CHANGE UP (ref 5–17)
APPEARANCE UR: CLEAR — SIGNIFICANT CHANGE UP
BACTERIA # UR AUTO: ABNORMAL /HPF
BILIRUB UR-MCNC: NEGATIVE — SIGNIFICANT CHANGE UP
BLD GP AB SCN SERPL QL: NEGATIVE — SIGNIFICANT CHANGE UP
BUN SERPL-MCNC: 19 MG/DL — SIGNIFICANT CHANGE UP (ref 7–23)
CALCIUM SERPL-MCNC: 8.1 MG/DL — LOW (ref 8.4–10.5)
CHLORIDE SERPL-SCNC: 103 MMOL/L — SIGNIFICANT CHANGE UP (ref 96–108)
CO2 SERPL-SCNC: 27 MMOL/L — SIGNIFICANT CHANGE UP (ref 22–31)
COLOR SPEC: YELLOW — SIGNIFICANT CHANGE UP
CREAT SERPL-MCNC: 0.87 MG/DL — SIGNIFICANT CHANGE UP (ref 0.5–1.3)
DIFF PNL FLD: NEGATIVE — SIGNIFICANT CHANGE UP
EPI CELLS # UR: SIGNIFICANT CHANGE UP /HPF
GLUCOSE SERPL-MCNC: 86 MG/DL — SIGNIFICANT CHANGE UP (ref 70–99)
GLUCOSE UR QL: NEGATIVE — SIGNIFICANT CHANGE UP
HCT VFR BLD CALC: 26 % — LOW (ref 34.5–45)
HGB BLD-MCNC: 8 G/DL — LOW (ref 11.5–15.5)
KETONES UR-MCNC: NEGATIVE — SIGNIFICANT CHANGE UP
LEUKOCYTE ESTERASE UR-ACNC: ABNORMAL
MCHC RBC-ENTMCNC: 29.9 PG — SIGNIFICANT CHANGE UP (ref 27–34)
MCHC RBC-ENTMCNC: 30.8 GM/DL — LOW (ref 32–36)
MCV RBC AUTO: 97 FL — SIGNIFICANT CHANGE UP (ref 80–100)
NITRITE UR-MCNC: NEGATIVE — SIGNIFICANT CHANGE UP
PH UR: 6.5 — SIGNIFICANT CHANGE UP (ref 5–8)
PLATELET # BLD AUTO: 315 K/UL — SIGNIFICANT CHANGE UP (ref 150–400)
POTASSIUM SERPL-MCNC: 4.3 MMOL/L — SIGNIFICANT CHANGE UP (ref 3.5–5.3)
POTASSIUM SERPL-SCNC: 4.3 MMOL/L — SIGNIFICANT CHANGE UP (ref 3.5–5.3)
PROT UR-MCNC: NEGATIVE — SIGNIFICANT CHANGE UP
RBC # BLD: 2.68 M/UL — LOW (ref 3.8–5.2)
RBC # FLD: 15.3 % — HIGH (ref 10.3–14.5)
RH IG SCN BLD-IMP: POSITIVE — SIGNIFICANT CHANGE UP
SODIUM SERPL-SCNC: 141 MMOL/L — SIGNIFICANT CHANGE UP (ref 135–145)
SP GR SPEC: 1.01 — SIGNIFICANT CHANGE UP (ref 1.01–1.02)
UROBILINOGEN FLD QL: NEGATIVE — SIGNIFICANT CHANGE UP
WBC # BLD: 4.59 K/UL — SIGNIFICANT CHANGE UP (ref 3.8–10.5)
WBC # FLD AUTO: 4.59 K/UL — SIGNIFICANT CHANGE UP (ref 3.8–10.5)
WBC UR QL: SIGNIFICANT CHANGE UP /HPF (ref 0–5)

## 2018-07-18 PROCEDURE — 73552 X-RAY EXAM OF FEMUR 2/>: CPT | Mod: 26,RT

## 2018-07-18 RX ORDER — HEPARIN SODIUM 5000 [USP'U]/ML
5000 INJECTION INTRAVENOUS; SUBCUTANEOUS EVERY 8 HOURS
Qty: 0 | Refills: 0 | Status: COMPLETED | OUTPATIENT
Start: 2018-07-18 | End: 2018-07-18

## 2018-07-18 RX ORDER — SODIUM CHLORIDE 9 MG/ML
1000 INJECTION, SOLUTION INTRAVENOUS
Qty: 0 | Refills: 0 | Status: DISCONTINUED | OUTPATIENT
Start: 2018-07-18 | End: 2018-07-19

## 2018-07-18 RX ORDER — CIPROFLOXACIN LACTATE 400MG/40ML
250 VIAL (ML) INTRAVENOUS EVERY 12 HOURS
Qty: 0 | Refills: 0 | Status: DISCONTINUED | OUTPATIENT
Start: 2018-07-18 | End: 2018-07-19

## 2018-07-18 RX ORDER — ACETAMINOPHEN 500 MG
1000 TABLET ORAL ONCE
Qty: 0 | Refills: 0 | Status: COMPLETED | OUTPATIENT
Start: 2018-07-18 | End: 2018-07-18

## 2018-07-18 RX ORDER — QUETIAPINE FUMARATE 200 MG/1
150 TABLET, FILM COATED ORAL AT BEDTIME
Qty: 0 | Refills: 0 | Status: DISCONTINUED | OUTPATIENT
Start: 2018-07-18 | End: 2018-07-19

## 2018-07-18 RX ADMIN — ALBUTEROL 2 PUFF(S): 90 AEROSOL, METERED ORAL at 05:49

## 2018-07-18 RX ADMIN — HYDROMORPHONE HYDROCHLORIDE 1 MILLIGRAM(S): 2 INJECTION INTRAMUSCULAR; INTRAVENOUS; SUBCUTANEOUS at 04:20

## 2018-07-18 RX ADMIN — CARVEDILOL PHOSPHATE 12.5 MILLIGRAM(S): 80 CAPSULE, EXTENDED RELEASE ORAL at 05:45

## 2018-07-18 RX ADMIN — Medication 100 MILLIGRAM(S): at 21:39

## 2018-07-18 RX ADMIN — HEPARIN SODIUM 5000 UNIT(S): 5000 INJECTION INTRAVENOUS; SUBCUTANEOUS at 13:28

## 2018-07-18 RX ADMIN — Medication 650 MILLIGRAM(S): at 15:08

## 2018-07-18 RX ADMIN — HYDROMORPHONE HYDROCHLORIDE 1 MILLIGRAM(S): 2 INJECTION INTRAMUSCULAR; INTRAVENOUS; SUBCUTANEOUS at 12:26

## 2018-07-18 RX ADMIN — HEPARIN SODIUM 5000 UNIT(S): 5000 INJECTION INTRAVENOUS; SUBCUTANEOUS at 05:46

## 2018-07-18 RX ADMIN — QUETIAPINE FUMARATE 50 MILLIGRAM(S): 200 TABLET, FILM COATED ORAL at 21:39

## 2018-07-18 RX ADMIN — HYDROMORPHONE HYDROCHLORIDE 4 MILLIGRAM(S): 2 INJECTION INTRAMUSCULAR; INTRAVENOUS; SUBCUTANEOUS at 14:28

## 2018-07-18 RX ADMIN — HYDROMORPHONE HYDROCHLORIDE 1 MILLIGRAM(S): 2 INJECTION INTRAMUSCULAR; INTRAVENOUS; SUBCUTANEOUS at 13:25

## 2018-07-18 RX ADMIN — HYDROMORPHONE HYDROCHLORIDE 4 MILLIGRAM(S): 2 INJECTION INTRAMUSCULAR; INTRAVENOUS; SUBCUTANEOUS at 02:10

## 2018-07-18 RX ADMIN — HEPARIN SODIUM 5000 UNIT(S): 5000 INJECTION INTRAVENOUS; SUBCUTANEOUS at 21:39

## 2018-07-18 RX ADMIN — Medication 400 MILLIGRAM(S): at 23:16

## 2018-07-18 RX ADMIN — ALBUTEROL 2 PUFF(S): 90 AEROSOL, METERED ORAL at 12:32

## 2018-07-18 RX ADMIN — HYDROMORPHONE HYDROCHLORIDE 4 MILLIGRAM(S): 2 INJECTION INTRAMUSCULAR; INTRAVENOUS; SUBCUTANEOUS at 09:23

## 2018-07-18 RX ADMIN — Medication 650 MILLIGRAM(S): at 16:09

## 2018-07-18 RX ADMIN — GABAPENTIN 300 MILLIGRAM(S): 400 CAPSULE ORAL at 05:45

## 2018-07-18 RX ADMIN — Medication 0.5 MILLIGRAM(S): at 05:49

## 2018-07-18 RX ADMIN — TIOTROPIUM BROMIDE 1 CAPSULE(S): 18 CAPSULE ORAL; RESPIRATORY (INHALATION) at 16:08

## 2018-07-18 RX ADMIN — Medication 100 MILLIGRAM(S): at 05:45

## 2018-07-18 RX ADMIN — Medication 400 MILLIGRAM(S): at 02:14

## 2018-07-18 RX ADMIN — HYDROMORPHONE HYDROCHLORIDE 1 MILLIGRAM(S): 2 INJECTION INTRAMUSCULAR; INTRAVENOUS; SUBCUTANEOUS at 14:05

## 2018-07-18 RX ADMIN — HYDROMORPHONE HYDROCHLORIDE 1 MILLIGRAM(S): 2 INJECTION INTRAMUSCULAR; INTRAVENOUS; SUBCUTANEOUS at 18:02

## 2018-07-18 RX ADMIN — Medication 0.5 MILLIGRAM(S): at 18:07

## 2018-07-18 RX ADMIN — HYDROMORPHONE HYDROCHLORIDE 4 MILLIGRAM(S): 2 INJECTION INTRAMUSCULAR; INTRAVENOUS; SUBCUTANEOUS at 06:35

## 2018-07-18 RX ADMIN — SODIUM CHLORIDE 100 MILLILITER(S): 9 INJECTION, SOLUTION INTRAVENOUS at 23:23

## 2018-07-18 RX ADMIN — Medication 90 MILLIGRAM(S): at 05:46

## 2018-07-18 RX ADMIN — Medication 1000 MILLIGRAM(S): at 02:29

## 2018-07-18 RX ADMIN — HYDROMORPHONE HYDROCHLORIDE 4 MILLIGRAM(S): 2 INJECTION INTRAMUSCULAR; INTRAVENOUS; SUBCUTANEOUS at 20:20

## 2018-07-18 RX ADMIN — Medication 50 MILLIGRAM(S): at 14:25

## 2018-07-18 RX ADMIN — HYDROMORPHONE HYDROCHLORIDE 1 MILLIGRAM(S): 2 INJECTION INTRAMUSCULAR; INTRAVENOUS; SUBCUTANEOUS at 09:34

## 2018-07-18 RX ADMIN — SENNA PLUS 2 TABLET(S): 8.6 TABLET ORAL at 21:39

## 2018-07-18 RX ADMIN — Medication 250 MILLIGRAM(S): at 18:07

## 2018-07-18 RX ADMIN — Medication 50 MILLIGRAM(S): at 21:39

## 2018-07-18 RX ADMIN — GABAPENTIN 300 MILLIGRAM(S): 400 CAPSULE ORAL at 21:39

## 2018-07-18 RX ADMIN — HYDROMORPHONE HYDROCHLORIDE 1 MILLIGRAM(S): 2 INJECTION INTRAMUSCULAR; INTRAVENOUS; SUBCUTANEOUS at 22:03

## 2018-07-18 RX ADMIN — HYDROMORPHONE HYDROCHLORIDE 1 MILLIGRAM(S): 2 INJECTION INTRAMUSCULAR; INTRAVENOUS; SUBCUTANEOUS at 22:18

## 2018-07-18 RX ADMIN — GABAPENTIN 300 MILLIGRAM(S): 400 CAPSULE ORAL at 13:29

## 2018-07-18 RX ADMIN — CARVEDILOL PHOSPHATE 12.5 MILLIGRAM(S): 80 CAPSULE, EXTENDED RELEASE ORAL at 18:07

## 2018-07-18 RX ADMIN — ALBUTEROL 2 PUFF(S): 90 AEROSOL, METERED ORAL at 18:35

## 2018-07-18 RX ADMIN — HYDROMORPHONE HYDROCHLORIDE 4 MILLIGRAM(S): 2 INJECTION INTRAMUSCULAR; INTRAVENOUS; SUBCUTANEOUS at 02:40

## 2018-07-18 RX ADMIN — PANTOPRAZOLE SODIUM 40 MILLIGRAM(S): 20 TABLET, DELAYED RELEASE ORAL at 05:49

## 2018-07-18 RX ADMIN — HYDROMORPHONE HYDROCHLORIDE 1 MILLIGRAM(S): 2 INJECTION INTRAMUSCULAR; INTRAVENOUS; SUBCUTANEOUS at 04:35

## 2018-07-18 RX ADMIN — Medication 1000 MILLIGRAM(S): at 23:31

## 2018-07-18 RX ADMIN — HYDROMORPHONE HYDROCHLORIDE 4 MILLIGRAM(S): 2 INJECTION INTRAMUSCULAR; INTRAVENOUS; SUBCUTANEOUS at 16:09

## 2018-07-18 RX ADMIN — Medication 50 MILLIGRAM(S): at 05:45

## 2018-07-18 RX ADMIN — Medication 1 PATCH: at 18:31

## 2018-07-18 NOTE — CHART NOTE - NSCHARTNOTEFT_GEN_A_CORE
Preop Dx: R Goff B Periprosthetic fracture  Procedure: ORIF  Surgeon: Kraig    CBC Full  -  ( 2018 08:15 )  WBC Count : 4.59 K/uL  Hemoglobin : 8.0 g/dL  Hematocrit : 26.0 %  Platelet Count - Automated : 315 K/uL  Mean Cell Volume : 97.0 fl  Mean Cell Hemoglobin : 29.9 pg  Mean Cell Hemoglobin Concentration : 30.8 gm/dL  Auto Neutrophil # : x  Auto Lymphocyte # : x  Auto Monocyte # : x  Auto Eosinophil # : x  Auto Basophil # : x  Auto Neutrophil % : x  Auto Lymphocyte % : x  Auto Monocyte % : x  Auto Eosinophil % : x  Auto Basophil % : x          141  |  103  |  19  ----------------------------<  86  4.3   |  27  |  0.87    Ca    8.1<L>      2018 06:50    TPro  7.4  /  Alb  3.1<L>  /  TBili  0.3  /  DBili  x   /  AST  10  /  ALT  10  /  AlkPhos  106  07-16      PT/INR - ( 2018 12:46 )   PT: 12.4 sec;   INR: 1.13 ratio         PTT - ( 2018 12:46 )  PTT:30.5 sec    Urinalysis Basic - ( 2018 06:52 )    Color: Yellow / Appearance: Clear / S.011 / pH: x  Gluc: x / Ketone: Negative  / Bili: Negative / Urobili: Negative   Blood: x / Protein: Negative / Nitrite: Negative   Leuk Esterase: Moderate / RBC: x / WBC 11-25 /HPF   Sq Epi: x / Non Sq Epi: OCC /HPF / Bacteria: Few /HPF    ABO Interpretation: O (18 @ 07:00)    Urine BHCG: N/A    CXR:  < from: Xray Chest 1 View AP/PA (18 @ 13:29) >    Impression:    The heart is slightly enlarged. The lungs areclear. The visualized bones   appear to be normal for the patient's age.    < end of copied text >    EKG: < from: 12 Lead ECG (18 @ 13:36) >    Ventricular Rate 63 BPM    Atrial Rate 63 BPM    P-R Interval 122 ms    QRS Duration 82 ms     ms    QTc 444 ms    P Axis 72 degrees    R Axis 60 degrees    T Axis 37 degrees    Diagnosis Line NORMAL SINUS RHYTHM  POSSIBLE LEFT ATRIAL ENLARGEMENT    < end of copied text >    Clearance: Charted 18    Consent: Charted      -NPO after midnight/IVF while NPO  -Hold anticoag  -Analgesia  -Bedrest    OMAR Lara PA-C  #[id number]

## 2018-07-18 NOTE — PROGRESS NOTE ADULT - SUBJECTIVE AND OBJECTIVE BOX
Patient is a 72y old  Female who presents with a chief complaint of Right hip pain[facility]. Pt states she has been unable to ambulate for the past few days. Denies any new falls/trauma. Pt had revision R USAMA on 18 by Dr. Be for a periprosthetic R femur fracture. Pt was having continued pain which significantly worsened a few days ago. Denies fever/chills. Denies numbness/tingling. No other complaints at this time. X-rays show a nonunion  requiring a revision of the periprosthetic fracture repair.  The procedure is scheduled for 2018.  In addition to moderate pain, the patient has severe anxiety. Patient seen today complaining of pain at the fracture site      MEDICATIONS  (STANDING):  ALBUTerol    90 MICROgram(s) HFA Inhaler 2 Puff(s) Inhalation every 6 hours  buDESOnide   0.5 milliGRAM(s) Respule 0.5 milliGRAM(s) Inhalation two times a day  carvedilol 12.5 milliGRAM(s) Oral every 12 hours  ciprofloxacin     Tablet 250 milliGRAM(s) Oral every 12 hours  docusate sodium 100 milliGRAM(s) Oral three times a day  gabapentin 300 milliGRAM(s) Oral three times a day  hydrALAZINE 50 milliGRAM(s) Oral every 8 hours  lactated ringers. 1000 milliLiter(s) (100 mL/Hr) IV Continuous <Continuous>  lactated ringers. 1000 milliLiter(s) (80 mL/Hr) IV Continuous <Continuous>  multivitamin 1 Tablet(s) Oral daily  nicotine - 21 mG/24Hr(s) Patch 1 patch Transdermal daily  NIFEdipine XL 90 milliGRAM(s) Oral daily  pantoprazole    Tablet 40 milliGRAM(s) Oral before breakfast  QUEtiapine 150 milliGRAM(s) Oral at bedtime  senna 2 Tablet(s) Oral at bedtime  thiamine 100 milliGRAM(s) Oral daily  tiotropium 18 MICROgram(s) Capsule 1 Capsule(s) Inhalation daily    MEDICATIONS  (PRN):  acetaminophen   Tablet 650 milliGRAM(s) Oral every 6 hours PRN For Temp greater than 38 C (100.4 F)  acetaminophen   Tablet. 650 milliGRAM(s) Oral every 6 hours PRN Headache  acetaminophen  IVPB. 1000 milliGRAM(s) IV Intermittent once PRN pain resistant to narcotics  diphenhydrAMINE   Capsule 25 milliGRAM(s) Oral at bedtime PRN Insomnia  HYDROmorphone   Tablet 2 milliGRAM(s) Oral every 3 hours PRN Moderate Pain (4 - 6)  HYDROmorphone   Tablet 4 milliGRAM(s) Oral every 3 hours PRN Severe Pain (7 - 10)  HYDROmorphone  Injectable 1 milliGRAM(s) IV Push every 4 hours PRN breakthrough pain  LORazepam     Tablet 0.5 milliGRAM(s) Oral every 4 hours PRN Anxiety  ondansetron Injectable 4 milliGRAM(s) IV Push every 4 hours PRN Nausea and/or Vomiting          VITALS:   T(C): 36.7 (18 @ 21:12), Max: 36.9 (18 @ 04:40)  HR: 71 (18 @ 21:12) (65 - 72)  BP: 155/63 (18 @ 21:12) (113/60 - 155/63)  RR: 18 (18 @ 21:12) (18 - 18)  SpO2: 98% (18 @ 21:12) (94% - 98%)  Wt(kg): --    PHYSICAL EXAM:  GENERAL: NAD, well nourished and conversant  HEAD:  Atraumatic  EYES: EOM, PERRLA, conjunctiva pink and sclera white  ENT: No tonsillar erythema, exudates, or enlargement, moist mucous membranes, good dentition, no lesions  NECK: Supple, No JVD, normal thyroid, carotids with normal upstrokes and no bruits  CHEST/LUNG: Clear to auscultation bilaterally, No rales, rhonchi, wheezing, or rubs  HEART: Regular rate and rhythm, No murmurs, rubs, or gallops  ABDOMEN: Soft, nondistended, no masses, guarding, tenderness or rebound, bowel sounds present  EXTREMITIES:  2+ Peripheral Pulses, No clubbing, cyanosis, or edema. No arthritis of shoulders, elbows, hands, hips, knees, ankles, or feet. No DJD C spine, T spine, or L/S spine  LYMPH: No lymphadenopathy noted  SKIN: No rashes or lesions  NERVOUS SYSTEM:  Alert & Oriented X3, normal cognitive function. Motor Strength 5/5 right upper and right lower.  5/5 left upper and left lower extremities, DTRs 2+ intact and symmetric    LABS:        CBC Full  -  ( 2018 08:15 )  WBC Count : 4.59 K/uL  Hemoglobin : 8.0 g/dL  Hematocrit : 26.0 %  Platelet Count - Automated : 315 K/uL  Mean Cell Volume : 97.0 fl  Mean Cell Hemoglobin : 29.9 pg  Mean Cell Hemoglobin Concentration : 30.8 gm/dL  Auto Neutrophil # : x  Auto Lymphocyte # : x  Auto Monocyte # : x  Auto Eosinophil # : x  Auto Basophil # : x  Auto Neutrophil % : x  Auto Lymphocyte % : x  Auto Monocyte % : x  Auto Eosinophil % : x  Auto Basophil % : x    07-18    141  |  103  |  19  ----------------------------<  86  4.3   |  27  |  0.87    Ca    8.1<L>      2018 06:50          Urinalysis Basic - ( 2018 06:52 )    Color: Yellow / Appearance: Clear / S.011 / pH: x  Gluc: x / Ketone: Negative  / Bili: Negative / Urobili: Negative   Blood: x / Protein: Negative / Nitrite: Negative   Leuk Esterase: Moderate / RBC: x / WBC 11-25 /HPF   Sq Epi: x / Non Sq Epi: OCC /HPF / Bacteria: Few /HPF      CAPILLARY BLOOD GLUCOSE          RADIOLOGY & ADDITIONAL TESTS:

## 2018-07-19 ENCOUNTER — APPOINTMENT (OUTPATIENT)
Dept: ORTHOPEDIC SURGERY | Facility: HOSPITAL | Age: 73
End: 2018-07-19

## 2018-07-19 LAB
ANION GAP SERPL CALC-SCNC: 10 MMOL/L — SIGNIFICANT CHANGE UP (ref 5–17)
ANION GAP SERPL CALC-SCNC: 8 MMOL/L — SIGNIFICANT CHANGE UP (ref 5–17)
APTT BLD: 32.8 SEC — SIGNIFICANT CHANGE UP (ref 27.5–37.4)
BLD GP AB SCN SERPL QL: NEGATIVE — SIGNIFICANT CHANGE UP
BUN SERPL-MCNC: 20 MG/DL — SIGNIFICANT CHANGE UP (ref 7–23)
BUN SERPL-MCNC: 22 MG/DL — SIGNIFICANT CHANGE UP (ref 7–23)
CALCIUM SERPL-MCNC: 7.8 MG/DL — LOW (ref 8.4–10.5)
CALCIUM SERPL-MCNC: 8.7 MG/DL — SIGNIFICANT CHANGE UP (ref 8.4–10.5)
CHLORIDE SERPL-SCNC: 104 MMOL/L — SIGNIFICANT CHANGE UP (ref 96–108)
CHLORIDE SERPL-SCNC: 105 MMOL/L — SIGNIFICANT CHANGE UP (ref 96–108)
CO2 SERPL-SCNC: 25 MMOL/L — SIGNIFICANT CHANGE UP (ref 22–31)
CO2 SERPL-SCNC: 27 MMOL/L — SIGNIFICANT CHANGE UP (ref 22–31)
CREAT SERPL-MCNC: 0.72 MG/DL — SIGNIFICANT CHANGE UP (ref 0.5–1.3)
CREAT SERPL-MCNC: 0.83 MG/DL — SIGNIFICANT CHANGE UP (ref 0.5–1.3)
CULTURE RESULTS: SIGNIFICANT CHANGE UP
GLUCOSE SERPL-MCNC: 141 MG/DL — HIGH (ref 70–99)
GLUCOSE SERPL-MCNC: 98 MG/DL — SIGNIFICANT CHANGE UP (ref 70–99)
HCT VFR BLD CALC: 28.4 % — LOW (ref 34.5–45)
HCT VFR BLD CALC: 29.7 % — LOW (ref 34.5–45)
HGB BLD-MCNC: 10 G/DL — LOW (ref 11.5–15.5)
HGB BLD-MCNC: 9.4 G/DL — LOW (ref 11.5–15.5)
INR BLD: 1.1 RATIO — SIGNIFICANT CHANGE UP (ref 0.88–1.16)
MCHC RBC-ENTMCNC: 31.9 PG — SIGNIFICANT CHANGE UP (ref 27–34)
MCHC RBC-ENTMCNC: 32.6 PG — SIGNIFICANT CHANGE UP (ref 27–34)
MCHC RBC-ENTMCNC: 33 GM/DL — SIGNIFICANT CHANGE UP (ref 32–36)
MCHC RBC-ENTMCNC: 33.8 GM/DL — SIGNIFICANT CHANGE UP (ref 32–36)
MCV RBC AUTO: 96.4 FL — SIGNIFICANT CHANGE UP (ref 80–100)
MCV RBC AUTO: 96.6 FL — SIGNIFICANT CHANGE UP (ref 80–100)
PLATELET # BLD AUTO: 292 K/UL — SIGNIFICANT CHANGE UP (ref 150–400)
PLATELET # BLD AUTO: 293 K/UL — SIGNIFICANT CHANGE UP (ref 150–400)
POTASSIUM SERPL-MCNC: 4.6 MMOL/L — SIGNIFICANT CHANGE UP (ref 3.5–5.3)
POTASSIUM SERPL-MCNC: 5.1 MMOL/L — SIGNIFICANT CHANGE UP (ref 3.5–5.3)
POTASSIUM SERPL-SCNC: 4.6 MMOL/L — SIGNIFICANT CHANGE UP (ref 3.5–5.3)
POTASSIUM SERPL-SCNC: 5.1 MMOL/L — SIGNIFICANT CHANGE UP (ref 3.5–5.3)
PROTHROM AB SERPL-ACNC: 12 SEC — SIGNIFICANT CHANGE UP (ref 9.8–12.7)
RBC # BLD: 2.95 M/UL — LOW (ref 3.8–5.2)
RBC # BLD: 3.08 M/UL — LOW (ref 3.8–5.2)
RBC # FLD: 13.6 % — SIGNIFICANT CHANGE UP (ref 10.3–14.5)
RBC # FLD: 13.8 % — SIGNIFICANT CHANGE UP (ref 10.3–14.5)
RH IG SCN BLD-IMP: POSITIVE — SIGNIFICANT CHANGE UP
SODIUM SERPL-SCNC: 139 MMOL/L — SIGNIFICANT CHANGE UP (ref 135–145)
SODIUM SERPL-SCNC: 140 MMOL/L — SIGNIFICANT CHANGE UP (ref 135–145)
SPECIMEN SOURCE: SIGNIFICANT CHANGE UP
WBC # BLD: 6 K/UL — SIGNIFICANT CHANGE UP (ref 3.8–10.5)
WBC # BLD: 7.6 K/UL — SIGNIFICANT CHANGE UP (ref 3.8–10.5)
WBC # FLD AUTO: 6 K/UL — SIGNIFICANT CHANGE UP (ref 3.8–10.5)
WBC # FLD AUTO: 7.6 K/UL — SIGNIFICANT CHANGE UP (ref 3.8–10.5)

## 2018-07-19 PROCEDURE — 27244 TREAT THIGH FRACTURE: CPT | Mod: 78,RT

## 2018-07-19 PROCEDURE — 20680 REMOVAL OF IMPLANT DEEP: CPT | Mod: 59,78,RT

## 2018-07-19 RX ORDER — PANTOPRAZOLE SODIUM 20 MG/1
40 TABLET, DELAYED RELEASE ORAL
Qty: 0 | Refills: 0 | Status: DISCONTINUED | OUTPATIENT
Start: 2018-07-19 | End: 2018-07-24

## 2018-07-19 RX ORDER — ASCORBIC ACID 60 MG
500 TABLET,CHEWABLE ORAL
Qty: 0 | Refills: 0 | Status: DISCONTINUED | OUTPATIENT
Start: 2018-07-19 | End: 2018-07-24

## 2018-07-19 RX ORDER — SODIUM CHLORIDE 9 MG/ML
500 INJECTION INTRAMUSCULAR; INTRAVENOUS; SUBCUTANEOUS ONCE
Qty: 0 | Refills: 0 | Status: COMPLETED | OUTPATIENT
Start: 2018-07-20 | End: 2018-07-20

## 2018-07-19 RX ORDER — BENZOCAINE AND MENTHOL 5; 1 G/100ML; G/100ML
1 LIQUID ORAL
Qty: 0 | Refills: 0 | Status: DISCONTINUED | OUTPATIENT
Start: 2018-07-19 | End: 2018-07-24

## 2018-07-19 RX ORDER — ZALEPLON 10 MG
5 CAPSULE ORAL AT BEDTIME
Qty: 0 | Refills: 0 | Status: DISCONTINUED | OUTPATIENT
Start: 2018-07-19 | End: 2018-07-24

## 2018-07-19 RX ORDER — ACETAMINOPHEN 500 MG
650 TABLET ORAL EVERY 6 HOURS
Qty: 0 | Refills: 0 | Status: DISCONTINUED | OUTPATIENT
Start: 2018-07-19 | End: 2018-07-24

## 2018-07-19 RX ORDER — TIOTROPIUM BROMIDE 18 UG/1
1 CAPSULE ORAL; RESPIRATORY (INHALATION) DAILY
Qty: 0 | Refills: 0 | Status: COMPLETED | OUTPATIENT
Start: 2018-07-19 | End: 2019-06-17

## 2018-07-19 RX ORDER — QUETIAPINE FUMARATE 200 MG/1
150 TABLET, FILM COATED ORAL AT BEDTIME
Qty: 0 | Refills: 0 | Status: DISCONTINUED | OUTPATIENT
Start: 2018-07-19 | End: 2018-07-24

## 2018-07-19 RX ORDER — HYDROMORPHONE HYDROCHLORIDE 2 MG/ML
1 INJECTION INTRAMUSCULAR; INTRAVENOUS; SUBCUTANEOUS ONCE
Qty: 0 | Refills: 0 | Status: DISCONTINUED | OUTPATIENT
Start: 2018-07-19 | End: 2018-07-19

## 2018-07-19 RX ORDER — MAGNESIUM HYDROXIDE 400 MG/1
30 TABLET, CHEWABLE ORAL DAILY
Qty: 0 | Refills: 0 | Status: DISCONTINUED | OUTPATIENT
Start: 2018-07-19 | End: 2018-07-24

## 2018-07-19 RX ORDER — SODIUM CHLORIDE 9 MG/ML
500 INJECTION INTRAMUSCULAR; INTRAVENOUS; SUBCUTANEOUS ONCE
Qty: 0 | Refills: 0 | Status: COMPLETED | OUTPATIENT
Start: 2018-07-19 | End: 2018-07-19

## 2018-07-19 RX ORDER — HYDROMORPHONE HYDROCHLORIDE 2 MG/ML
1 INJECTION INTRAMUSCULAR; INTRAVENOUS; SUBCUTANEOUS EVERY 4 HOURS
Qty: 0 | Refills: 0 | Status: DISCONTINUED | OUTPATIENT
Start: 2018-07-19 | End: 2018-07-24

## 2018-07-19 RX ORDER — NIFEDIPINE 30 MG
90 TABLET, EXTENDED RELEASE 24 HR ORAL DAILY
Qty: 0 | Refills: 0 | Status: DISCONTINUED | OUTPATIENT
Start: 2018-07-19 | End: 2018-07-24

## 2018-07-19 RX ORDER — ACETAMINOPHEN 500 MG
1000 TABLET ORAL ONCE
Qty: 0 | Refills: 0 | Status: COMPLETED | OUTPATIENT
Start: 2018-07-19 | End: 2018-07-19

## 2018-07-19 RX ORDER — CIPROFLOXACIN LACTATE 400MG/40ML
250 VIAL (ML) INTRAVENOUS EVERY 12 HOURS
Qty: 0 | Refills: 0 | Status: DISCONTINUED | OUTPATIENT
Start: 2018-07-19 | End: 2018-07-20

## 2018-07-19 RX ORDER — ALBUTEROL 90 UG/1
1 AEROSOL, METERED ORAL EVERY 4 HOURS
Qty: 0 | Refills: 0 | Status: COMPLETED | OUTPATIENT
Start: 2018-07-19 | End: 2019-06-17

## 2018-07-19 RX ORDER — SODIUM CHLORIDE 9 MG/ML
1000 INJECTION, SOLUTION INTRAVENOUS
Qty: 0 | Refills: 0 | Status: DISCONTINUED | OUTPATIENT
Start: 2018-07-19 | End: 2018-07-24

## 2018-07-19 RX ORDER — HYDROMORPHONE HYDROCHLORIDE 2 MG/ML
2 INJECTION INTRAMUSCULAR; INTRAVENOUS; SUBCUTANEOUS EVERY 4 HOURS
Qty: 0 | Refills: 0 | Status: DISCONTINUED | OUTPATIENT
Start: 2018-07-19 | End: 2018-07-21

## 2018-07-19 RX ORDER — DIPHENHYDRAMINE HCL 50 MG
50 CAPSULE ORAL EVERY 6 HOURS
Qty: 0 | Refills: 0 | Status: DISCONTINUED | OUTPATIENT
Start: 2018-07-19 | End: 2018-07-24

## 2018-07-19 RX ORDER — THIAMINE MONONITRATE (VIT B1) 100 MG
100 TABLET ORAL DAILY
Qty: 0 | Refills: 0 | Status: DISCONTINUED | OUTPATIENT
Start: 2018-07-19 | End: 2018-07-19

## 2018-07-19 RX ORDER — HYDROMORPHONE HYDROCHLORIDE 2 MG/ML
4 INJECTION INTRAMUSCULAR; INTRAVENOUS; SUBCUTANEOUS
Qty: 0 | Refills: 0 | Status: DISCONTINUED | OUTPATIENT
Start: 2018-07-19 | End: 2018-07-24

## 2018-07-19 RX ORDER — HYDROMORPHONE HYDROCHLORIDE 2 MG/ML
6 INJECTION INTRAMUSCULAR; INTRAVENOUS; SUBCUTANEOUS
Qty: 0 | Refills: 0 | Status: DISCONTINUED | OUTPATIENT
Start: 2018-07-19 | End: 2018-07-20

## 2018-07-19 RX ORDER — HYDRALAZINE HCL 50 MG
50 TABLET ORAL EVERY 8 HOURS
Qty: 0 | Refills: 0 | Status: DISCONTINUED | OUTPATIENT
Start: 2018-07-19 | End: 2018-07-24

## 2018-07-19 RX ORDER — CARVEDILOL PHOSPHATE 80 MG/1
12.5 CAPSULE, EXTENDED RELEASE ORAL EVERY 12 HOURS
Qty: 0 | Refills: 0 | Status: DISCONTINUED | OUTPATIENT
Start: 2018-07-19 | End: 2018-07-24

## 2018-07-19 RX ORDER — GABAPENTIN 400 MG/1
300 CAPSULE ORAL THREE TIMES A DAY
Qty: 0 | Refills: 0 | Status: DISCONTINUED | OUTPATIENT
Start: 2018-07-19 | End: 2018-07-24

## 2018-07-19 RX ORDER — TRAMADOL HYDROCHLORIDE 50 MG/1
50 TABLET ORAL EVERY 6 HOURS
Qty: 0 | Refills: 0 | Status: DISCONTINUED | OUTPATIENT
Start: 2018-07-19 | End: 2018-07-23

## 2018-07-19 RX ORDER — IPRATROPIUM/ALBUTEROL SULFATE 18-103MCG
3 AEROSOL WITH ADAPTER (GRAM) INHALATION EVERY 6 HOURS
Qty: 0 | Refills: 0 | Status: DISCONTINUED | OUTPATIENT
Start: 2018-07-19 | End: 2018-07-24

## 2018-07-19 RX ORDER — ENOXAPARIN SODIUM 100 MG/ML
40 INJECTION SUBCUTANEOUS EVERY 24 HOURS
Qty: 0 | Refills: 0 | Status: DISCONTINUED | OUTPATIENT
Start: 2018-07-20 | End: 2018-07-24

## 2018-07-19 RX ORDER — ONDANSETRON 8 MG/1
4 TABLET, FILM COATED ORAL EVERY 6 HOURS
Qty: 0 | Refills: 0 | Status: DISCONTINUED | OUTPATIENT
Start: 2018-07-19 | End: 2018-07-24

## 2018-07-19 RX ORDER — HYDROMORPHONE HYDROCHLORIDE 2 MG/ML
2 INJECTION INTRAMUSCULAR; INTRAVENOUS; SUBCUTANEOUS
Qty: 0 | Refills: 0 | Status: DISCONTINUED | OUTPATIENT
Start: 2018-07-19 | End: 2018-07-19

## 2018-07-19 RX ORDER — FERROUS SULFATE 325(65) MG
325 TABLET ORAL
Qty: 0 | Refills: 0 | Status: DISCONTINUED | OUTPATIENT
Start: 2018-07-19 | End: 2018-07-24

## 2018-07-19 RX ORDER — FOLIC ACID 0.8 MG
1 TABLET ORAL DAILY
Qty: 0 | Refills: 0 | Status: DISCONTINUED | OUTPATIENT
Start: 2018-07-19 | End: 2018-07-24

## 2018-07-19 RX ORDER — IPRATROPIUM/ALBUTEROL SULFATE 18-103MCG
3 AEROSOL WITH ADAPTER (GRAM) INHALATION EVERY 6 HOURS
Qty: 0 | Refills: 0 | Status: DISCONTINUED | OUTPATIENT
Start: 2018-07-19 | End: 2018-07-19

## 2018-07-19 RX ORDER — CEFAZOLIN SODIUM 1 G
2000 VIAL (EA) INJECTION EVERY 8 HOURS
Qty: 0 | Refills: 0 | Status: COMPLETED | OUTPATIENT
Start: 2018-07-19 | End: 2018-07-20

## 2018-07-19 RX ORDER — HYDROMORPHONE HYDROCHLORIDE 2 MG/ML
4 INJECTION INTRAMUSCULAR; INTRAVENOUS; SUBCUTANEOUS
Qty: 0 | Refills: 0 | Status: DISCONTINUED | OUTPATIENT
Start: 2018-07-19 | End: 2018-07-19

## 2018-07-19 RX ORDER — NICOTINE POLACRILEX 2 MG
1 GUM BUCCAL DAILY
Qty: 0 | Refills: 0 | Status: DISCONTINUED | OUTPATIENT
Start: 2018-07-19 | End: 2018-07-24

## 2018-07-19 RX ORDER — THIAMINE MONONITRATE (VIT B1) 100 MG
100 TABLET ORAL DAILY
Qty: 0 | Refills: 0 | Status: DISCONTINUED | OUTPATIENT
Start: 2018-07-19 | End: 2018-07-24

## 2018-07-19 RX ORDER — CHLORHEXIDINE GLUCONATE 213 G/1000ML
1 SOLUTION TOPICAL ONCE
Qty: 0 | Refills: 0 | Status: COMPLETED | OUTPATIENT
Start: 2018-07-19 | End: 2018-07-19

## 2018-07-19 RX ADMIN — ALBUTEROL 2 PUFF(S): 90 AEROSOL, METERED ORAL at 05:06

## 2018-07-19 RX ADMIN — CHLORHEXIDINE GLUCONATE 1 APPLICATION(S): 213 SOLUTION TOPICAL at 08:32

## 2018-07-19 RX ADMIN — HYDROMORPHONE HYDROCHLORIDE 1 MILLIGRAM(S): 2 INJECTION INTRAMUSCULAR; INTRAVENOUS; SUBCUTANEOUS at 17:45

## 2018-07-19 RX ADMIN — Medication 1000 MILLIGRAM(S): at 18:00

## 2018-07-19 RX ADMIN — HYDROMORPHONE HYDROCHLORIDE 2 MILLIGRAM(S): 2 INJECTION INTRAMUSCULAR; INTRAVENOUS; SUBCUTANEOUS at 20:26

## 2018-07-19 RX ADMIN — Medication 400 MILLIGRAM(S): at 17:51

## 2018-07-19 RX ADMIN — GABAPENTIN 300 MILLIGRAM(S): 400 CAPSULE ORAL at 22:18

## 2018-07-19 RX ADMIN — Medication 500 MILLIGRAM(S): at 18:18

## 2018-07-19 RX ADMIN — Medication 50 MILLIGRAM(S): at 05:05

## 2018-07-19 RX ADMIN — SODIUM CHLORIDE 75 MILLILITER(S): 9 INJECTION, SOLUTION INTRAVENOUS at 15:31

## 2018-07-19 RX ADMIN — Medication 90 MILLIGRAM(S): at 05:05

## 2018-07-19 RX ADMIN — QUETIAPINE FUMARATE 150 MILLIGRAM(S): 200 TABLET, FILM COATED ORAL at 22:18

## 2018-07-19 RX ADMIN — Medication 250 MILLIGRAM(S): at 18:19

## 2018-07-19 RX ADMIN — SODIUM CHLORIDE 1000 MILLILITER(S): 9 INJECTION INTRAMUSCULAR; INTRAVENOUS; SUBCUTANEOUS at 20:09

## 2018-07-19 RX ADMIN — HYDROMORPHONE HYDROCHLORIDE 1 MILLIGRAM(S): 2 INJECTION INTRAMUSCULAR; INTRAVENOUS; SUBCUTANEOUS at 16:47

## 2018-07-19 RX ADMIN — Medication 100 MILLIGRAM(S): at 19:24

## 2018-07-19 RX ADMIN — TRAMADOL HYDROCHLORIDE 50 MILLIGRAM(S): 50 TABLET ORAL at 19:00

## 2018-07-19 RX ADMIN — TRAMADOL HYDROCHLORIDE 50 MILLIGRAM(S): 50 TABLET ORAL at 18:18

## 2018-07-19 RX ADMIN — HYDROMORPHONE HYDROCHLORIDE 1 MILLIGRAM(S): 2 INJECTION INTRAMUSCULAR; INTRAVENOUS; SUBCUTANEOUS at 03:47

## 2018-07-19 RX ADMIN — HYDROMORPHONE HYDROCHLORIDE 1 MILLIGRAM(S): 2 INJECTION INTRAMUSCULAR; INTRAVENOUS; SUBCUTANEOUS at 17:36

## 2018-07-19 RX ADMIN — HYDROMORPHONE HYDROCHLORIDE 1 MILLIGRAM(S): 2 INJECTION INTRAMUSCULAR; INTRAVENOUS; SUBCUTANEOUS at 07:34

## 2018-07-19 RX ADMIN — CARVEDILOL PHOSPHATE 12.5 MILLIGRAM(S): 80 CAPSULE, EXTENDED RELEASE ORAL at 05:08

## 2018-07-19 RX ADMIN — GABAPENTIN 300 MILLIGRAM(S): 400 CAPSULE ORAL at 05:05

## 2018-07-19 RX ADMIN — HYDROMORPHONE HYDROCHLORIDE 2 MILLIGRAM(S): 2 INJECTION INTRAMUSCULAR; INTRAVENOUS; SUBCUTANEOUS at 21:00

## 2018-07-19 RX ADMIN — HYDROMORPHONE HYDROCHLORIDE 1 MILLIGRAM(S): 2 INJECTION INTRAMUSCULAR; INTRAVENOUS; SUBCUTANEOUS at 03:32

## 2018-07-19 RX ADMIN — Medication 0.5 MILLIGRAM(S): at 17:36

## 2018-07-19 RX ADMIN — Medication 0.5 MILLIGRAM(S): at 05:06

## 2018-07-19 RX ADMIN — Medication 400 MILLIGRAM(S): at 23:25

## 2018-07-19 RX ADMIN — Medication 250 MILLIGRAM(S): at 05:05

## 2018-07-19 RX ADMIN — Medication 50 MILLIGRAM(S): at 22:18

## 2018-07-19 NOTE — BRIEF OPERATIVE NOTE - PROCEDURE
<<-----Click on this checkbox to enter Procedure Femur fracture surgery  07/19/2018    Active  AMEGAS

## 2018-07-19 NOTE — PROGRESS NOTE ADULT - SUBJECTIVE AND OBJECTIVE BOX
Patient is a 72y old  Female who presents with a chief complaint of Right hip pain. She presents from [facility] rehab c/o R hip pain. Pt states she has been unable to ambulate for the past few days. Denies any new falls/trauma. Pt had revision R USAMA on 18 by Dr. Be for a periprosthetic R femur fracture. Pt was having continued pain which significantly worsened a few days ago. Denies fever/chills. Denies numbness/tingling. No other complaints at this time. X-rays show a nonunion  requiring a revision of the periprosthetic fracture repair.  The procedure is scheduled for 2018.  In addition to moderate pain, the patient has severe anxiety. Patient seen today post op in PACU resting continue complaint of leg pain      MEDICATIONS  (STANDING):  ALBUTerol/ipratropium for Nebulization 3 milliLiter(s) Nebulizer every 6 hours  ascorbic acid 500 milliGRAM(s) Oral two times a day  carvedilol 12.5 milliGRAM(s) Oral every 12 hours  ceFAZolin   IVPB 2000 milliGRAM(s) IV Intermittent every 8 hours  ciprofloxacin     Tablet 250 milliGRAM(s) Oral every 12 hours  ferrous    sulfate 325 milliGRAM(s) Oral three times a day with meals  folic acid 1 milliGRAM(s) Oral daily  gabapentin 300 milliGRAM(s) Oral three times a day  hydrALAZINE 50 milliGRAM(s) Oral every 8 hours  lactated ringers. 1000 milliLiter(s) (75 mL/Hr) IV Continuous <Continuous>  multivitamin 1 Tablet(s) Oral daily  nicotine - 21 mG/24Hr(s) Patch 1 patch Transdermal daily  NIFEdipine XL 90 milliGRAM(s) Oral daily  pantoprazole    Tablet 40 milliGRAM(s) Oral before breakfast  QUEtiapine 150 milliGRAM(s) Oral at bedtime  thiamine 100 milliGRAM(s) Oral daily  traMADol 50 milliGRAM(s) Oral every 6 hours    MEDICATIONS  (PRN):  acetaminophen   Tablet 650 milliGRAM(s) Oral every 6 hours PRN For Temp greater than 38 C (100.4 F)  acetaminophen   Tablet. 650 milliGRAM(s) Oral every 6 hours PRN headache  ALBUTerol    90 MICROgram(s) HFA Inhaler 1 Puff(s) Inhalation every 4 hours PRN Shortness of Breath and/or Wheezing  benzocaine 15 mG/menthol 3.6 mG Lozenge 1 Lozenge Oral every 2 hours PRN Sore Throat  diphenhydrAMINE   Capsule 50 milliGRAM(s) Oral every 6 hours PRN Rash and/or Itching  HYDROmorphone   Tablet 4 milliGRAM(s) Oral every 3 hours PRN Moderate Pain (4 - 6)  HYDROmorphone   Tablet 6 milliGRAM(s) Oral every 3 hours PRN Severe Pain (7 - 10)  HYDROmorphone  Injectable 2 milliGRAM(s) IV Push every 4 hours PRN breakthrough  HYDROmorphone  Injectable 1 milliGRAM(s) IV Push every 4 hours PRN breakthrough pain  LORazepam     Tablet 0.5 milliGRAM(s) Oral every 4 hours PRN Anxiety  LORazepam   Injectable 1 milliGRAM(s) IV Push once PRN extreme anxiety  magnesium hydroxide Suspension 30 milliLiter(s) Oral daily PRN Constipation  ondansetron Injectable 4 milliGRAM(s) IV Push every 6 hours PRN Nausea and/or Vomiting  tiotropium 18 MICROgram(s) Capsule 1 Capsule(s) Inhalation daily PRN shortness of breath and/or wheezing  zaleplon 5 milliGRAM(s) Oral at bedtime PRN Insomnia          VITALS:   T(C): 36.7 (18 @ 22:34), Max: 36.8 (18 @ 08:58)  HR: 58 (18 @ 22:34) (51 - 68)  BP: 124/58 (18 @ 22:34) (115/54 - 178/76)  RR: 18 (18 @ 22:34) (14 - 26)  SpO2: 98% (18 @ 22:34) (92% - 100%)  Wt(kg): --    PHYSICAL EXAM:  GENERAL: NAD, well nourished and conversant  HEAD:  Atraumatic  EYES: EOM, PERRLA, conjunctiva pink and sclera white  ENT: No tonsillar erythema, exudates, or enlargement, moist mucous membranes, good dentition, no lesions  NECK: Supple, No JVD, normal thyroid, carotids with normal upstrokes and no bruits  CHEST/LUNG: Clear to auscultation bilaterally, No rales, rhonchi, wheezing, or rubs  HEART: Regular rate and rhythm, No murmurs, rubs, or gallops  ABDOMEN: Soft, nondistended, no masses, guarding, tenderness or rebound, bowel sounds present  EXTREMITIES:  2+ Peripheral Pulses, No clubbing, cyanosis, or edema. No arthritis of shoulders, elbows, hands, hips, knees, ankles, or feet. No DJD C spine, T spine, or L/S spine  LYMPH: No lymphadenopathy noted  SKIN: No rashes or lesions  NERVOUS SYSTEM:  Alert & Oriented X3, normal cognitive function. Motor Strength 5/5 right upper and right lower.  5/5 left upper and left lower extremities, DTRs 2+ intact and symmetric      LABS:        CBC Full  -  ( 2018 15:09 )  WBC Count : 7.6 K/uL  Hemoglobin : 9.4 g/dL  Hematocrit : 28.4 %  Platelet Count - Automated : 292 K/uL  Mean Cell Volume : 96.6 fl  Mean Cell Hemoglobin : 31.9 pg  Mean Cell Hemoglobin Concentration : 33.0 gm/dL  Auto Neutrophil # : x  Auto Lymphocyte # : x  Auto Monocyte # : x  Auto Eosinophil # : x  Auto Basophil # : x  Auto Neutrophil % : x  Auto Lymphocyte % : x  Auto Monocyte % : x  Auto Eosinophil % : x  Auto Basophil % : x    07-19    139  |  104  |  20  ----------------------------<  141<H>  5.1   |  25  |  0.72    Ca    7.8<L>      2018 15:09        PT/INR - ( 2018 04:57 )   PT: 12.0 sec;   INR: 1.10 ratio         PTT - ( 2018 04:57 )  PTT:32.8 sec  Urinalysis Basic - ( 2018 06:52 )    Color: Yellow / Appearance: Clear / S.011 / pH: x  Gluc: x / Ketone: Negative  / Bili: Negative / Urobili: Negative   Blood: x / Protein: Negative / Nitrite: Negative   Leuk Esterase: Moderate / RBC: x / WBC 11-25 /HPF   Sq Epi: x / Non Sq Epi: OCC /HPF / Bacteria: Few /HPF      CAPILLARY BLOOD GLUCOSE          RADIOLOGY & ADDITIONAL TESTS:

## 2018-07-19 NOTE — PROGRESS NOTE ADULT - SUBJECTIVE AND OBJECTIVE BOX
Patient seen and examined. pain controlled, resting in bed. No events overnight.    ICU Vital Signs Last 24 Hrs  T(C): 36.5 (19 Jul 2018 04:56), Max: 36.8 (18 Jul 2018 14:11)  T(F): 97.7 (19 Jul 2018 04:56), Max: 98.3 (18 Jul 2018 14:11)  HR: 60 (19 Jul 2018 04:56) (60 - 71)  BP: 178/76 (19 Jul 2018 04:56) (113/60 - 178/76)  BP(mean): --  ABP: --  ABP(mean): --  RR: 18 (19 Jul 2018 04:56) (18 - 18)  SpO2: 95% (19 Jul 2018 04:56) (95% - 98%)      Gen: Awake, alert, NAD    RLE  -incision healed  -EHL/FHL/TA/GSC +  -SILT L4-S1  -DP/PT pulses palpable  -comparments soft  -no calf TTP B/L

## 2018-07-19 NOTE — PROGRESS NOTE ADULT - SUBJECTIVE AND OBJECTIVE BOX
ORTHO ATTENDING POST OP    s/p ORIF  R femur fx  TTWB R  JAMI  Castro Dressing  Lovenox 40 QD  Venodynes  CBC in RR and AM  OOB to chair in AM  Ancef 1 g x 24h  rehab consult  f/u medicine

## 2018-07-19 NOTE — CHART NOTE - NSCHARTNOTEFT_GEN_A_CORE
Orthopedic POC    Seen in RR  c/o severe incisional pain  .  No Chest Pain, SOB, N/V.    T(C): 36.3 (07-19-18 @ 16:15), Max: 36.8 (07-19-18 @ 08:58)  HR: 56 (07-19-18 @ 16:15) (53 - 71)  BP: 152/65 (07-19-18 @ 16:15) (115/54 - 178/76)  RR: 14 (07-19-18 @ 16:15) (14 - 18)  SpO2: 100% (07-19-18 @ 16:15) (92% - 100%)      Exam:  Alert and [place], No Acute Distress  Card: +S1/S2, RRR  Pulm: CTAB  Abdomen soft / benign  Silva  [y ]   EXT   LLE        Aquacel dressing C/D [x ]        Calves soft       (+) DF  PT;  EHL/ FHL 5/5        No Sensory Deficits noted        1+ pulses    Xray:----  prosthesis in place w/ Cables / plates                          9.4<L>  7.6   )-----------( 292      ( 19 Jul 2018 15:09 )             28.4<L>     07-19    139  |  104  |  20  ----------------------------<  141<H>  5.1   |  25  |  0.72      A/P: S/p Open Reduction Internal Fixation / Surgical Repair R Yulisa-prosthetic Fx    - Pain Meds Adjusted  -PT/OT-TTWB  -Chk AM Labs  -DVT PPx: Lovenox QD  -Pain Control PO/IV Pain Rx  - Monitor closely      ***See Above  Rivas HA  Orthopedics  B: [id number]  S: [id number] Orthopedic POC    Seen in RR  c/o severe incisional pain  .  No Chest Pain, SOB, N/V.    T(C): 36.3 (07-19-18 @ 16:15), Max: 36.8 (07-19-18 @ 08:58)  HR: 56 (07-19-18 @ 16:15) (53 - 71)  BP: 152/65 (07-19-18 @ 16:15) (115/54 - 178/76)  RR: 14 (07-19-18 @ 16:15) (14 - 18)  SpO2: 100% (07-19-18 @ 16:15) (92% - 100%)      Exam:  Alert and [place], No Acute Distress  Card: +S1/S2, RRR  Pulm: CTAB  Abdomen soft / benign  Silva  [y ]   EXT   LLE        Aquacel dressing C/D [x ]        Calves soft       (+) DF  PT;  EHL/ FHL 5/5        No Sensory Deficits noted        1+ pulses    Xray:----  prosthesis in place w/ Cables / plates    Urine Cx:  E-coli 50K                          9.4<L>  7.6   )-----------( 292      ( 19 Jul 2018 15:09 )             28.4<L>     07-19    139  |  104  |  20  ----------------------------<  141<H>  5.1   |  25  |  0.72      A/P: S/p Open Reduction Internal Fixation / Surgical Repair R Yulisa-prosthetic Fx    - Pain Meds Adjusted  -PT/OT-TTWB  -Chk AM Labs  -DVT PPx: Lovenox QD  -Pain Control PO/IV Pain Rx  -cont cipro: f/u sensitivities  - Monitor closely      ***See Above  Rivas HA  Orthopedics  B: [id number]  S: [phone]

## 2018-07-20 ENCOUNTER — TRANSCRIPTION ENCOUNTER (OUTPATIENT)
Age: 73
End: 2018-07-20

## 2018-07-20 LAB
-  AMIKACIN: SIGNIFICANT CHANGE UP
-  AMOXICILLIN/CLAVULANIC ACID: SIGNIFICANT CHANGE UP
-  AMPICILLIN/SULBACTAM: SIGNIFICANT CHANGE UP
-  AMPICILLIN: SIGNIFICANT CHANGE UP
-  AZTREONAM: SIGNIFICANT CHANGE UP
-  CEFAZOLIN: SIGNIFICANT CHANGE UP
-  CEFEPIME: SIGNIFICANT CHANGE UP
-  CEFOXITIN: SIGNIFICANT CHANGE UP
-  CEFTRIAXONE: SIGNIFICANT CHANGE UP
-  CIPROFLOXACIN: SIGNIFICANT CHANGE UP
-  ERTAPENEM: SIGNIFICANT CHANGE UP
-  GENTAMICIN: SIGNIFICANT CHANGE UP
-  IMIPENEM: SIGNIFICANT CHANGE UP
-  LEVOFLOXACIN: SIGNIFICANT CHANGE UP
-  MEROPENEM: SIGNIFICANT CHANGE UP
-  NITROFURANTOIN: SIGNIFICANT CHANGE UP
-  PIPERACILLIN/TAZOBACTAM: SIGNIFICANT CHANGE UP
-  TIGECYCLINE: SIGNIFICANT CHANGE UP
-  TOBRAMYCIN: SIGNIFICANT CHANGE UP
-  TRIMETHOPRIM/SULFAMETHOXAZOLE: SIGNIFICANT CHANGE UP
ANION GAP SERPL CALC-SCNC: 9 MMOL/L — SIGNIFICANT CHANGE UP (ref 5–17)
BUN SERPL-MCNC: 20 MG/DL — SIGNIFICANT CHANGE UP (ref 7–23)
CALCIUM SERPL-MCNC: 7.9 MG/DL — LOW (ref 8.4–10.5)
CHLORIDE SERPL-SCNC: 103 MMOL/L — SIGNIFICANT CHANGE UP (ref 96–108)
CO2 SERPL-SCNC: 26 MMOL/L — SIGNIFICANT CHANGE UP (ref 22–31)
CREAT SERPL-MCNC: 0.93 MG/DL — SIGNIFICANT CHANGE UP (ref 0.5–1.3)
CULTURE RESULTS: SIGNIFICANT CHANGE UP
GLUCOSE SERPL-MCNC: 102 MG/DL — HIGH (ref 70–99)
HCT VFR BLD CALC: 26.6 % — LOW (ref 34.5–45)
HGB BLD-MCNC: 8.4 G/DL — LOW (ref 11.5–15.5)
MCHC RBC-ENTMCNC: 30.5 PG — SIGNIFICANT CHANGE UP (ref 27–34)
MCHC RBC-ENTMCNC: 31.6 GM/DL — LOW (ref 32–36)
MCV RBC AUTO: 96.7 FL — SIGNIFICANT CHANGE UP (ref 80–100)
METHOD TYPE: SIGNIFICANT CHANGE UP
ORGANISM # SPEC MICROSCOPIC CNT: SIGNIFICANT CHANGE UP
ORGANISM # SPEC MICROSCOPIC CNT: SIGNIFICANT CHANGE UP
PLATELET # BLD AUTO: 344 K/UL — SIGNIFICANT CHANGE UP (ref 150–400)
POTASSIUM SERPL-MCNC: 4.4 MMOL/L — SIGNIFICANT CHANGE UP (ref 3.5–5.3)
POTASSIUM SERPL-SCNC: 4.4 MMOL/L — SIGNIFICANT CHANGE UP (ref 3.5–5.3)
RBC # BLD: 2.75 M/UL — LOW (ref 3.8–5.2)
RBC # FLD: 15.6 % — HIGH (ref 10.3–14.5)
SODIUM SERPL-SCNC: 138 MMOL/L — SIGNIFICANT CHANGE UP (ref 135–145)
SPECIMEN SOURCE: SIGNIFICANT CHANGE UP
WBC # BLD: 8.19 K/UL — SIGNIFICANT CHANGE UP (ref 3.8–10.5)
WBC # FLD AUTO: 8.19 K/UL — SIGNIFICANT CHANGE UP (ref 3.8–10.5)

## 2018-07-20 RX ORDER — SODIUM CHLORIDE 9 MG/ML
1000 INJECTION INTRAMUSCULAR; INTRAVENOUS; SUBCUTANEOUS ONCE
Qty: 0 | Refills: 0 | Status: COMPLETED | OUTPATIENT
Start: 2018-07-20 | End: 2018-07-20

## 2018-07-20 RX ORDER — ASCORBIC ACID 60 MG
1 TABLET,CHEWABLE ORAL
Qty: 0 | Refills: 0 | COMMUNITY
Start: 2018-07-20

## 2018-07-20 RX ORDER — TIOTROPIUM BROMIDE 18 UG/1
1 CAPSULE ORAL; RESPIRATORY (INHALATION) DAILY
Qty: 0 | Refills: 0 | Status: DISCONTINUED | OUTPATIENT
Start: 2018-07-20 | End: 2018-07-24

## 2018-07-20 RX ORDER — HYDROMORPHONE HYDROCHLORIDE 2 MG/ML
1 INJECTION INTRAMUSCULAR; INTRAVENOUS; SUBCUTANEOUS
Qty: 0 | Refills: 0 | COMMUNITY
Start: 2018-07-20

## 2018-07-20 RX ORDER — ACETAMINOPHEN 500 MG
2 TABLET ORAL
Qty: 0 | Refills: 0 | COMMUNITY
Start: 2018-07-20

## 2018-07-20 RX ORDER — HYDROMORPHONE HYDROCHLORIDE 2 MG/ML
8 INJECTION INTRAMUSCULAR; INTRAVENOUS; SUBCUTANEOUS
Qty: 0 | Refills: 0 | Status: DISCONTINUED | OUTPATIENT
Start: 2018-07-20 | End: 2018-07-24

## 2018-07-20 RX ORDER — ALBUTEROL 90 UG/1
1 AEROSOL, METERED ORAL EVERY 4 HOURS
Qty: 0 | Refills: 0 | Status: DISCONTINUED | OUTPATIENT
Start: 2018-07-20 | End: 2018-07-24

## 2018-07-20 RX ORDER — OXYCODONE HYDROCHLORIDE 5 MG/1
15 TABLET ORAL EVERY 12 HOURS
Qty: 0 | Refills: 0 | Status: DISCONTINUED | OUTPATIENT
Start: 2018-07-20 | End: 2018-07-21

## 2018-07-20 RX ORDER — FOLIC ACID 0.8 MG
1 TABLET ORAL
Qty: 0 | Refills: 0 | COMMUNITY
Start: 2018-07-20

## 2018-07-20 RX ORDER — TRAMADOL HYDROCHLORIDE 50 MG/1
1 TABLET ORAL
Qty: 0 | Refills: 0 | COMMUNITY
Start: 2018-07-20

## 2018-07-20 RX ORDER — ENOXAPARIN SODIUM 100 MG/ML
40 INJECTION SUBCUTANEOUS
Qty: 0 | Refills: 0 | COMMUNITY
Start: 2018-07-20

## 2018-07-20 RX ORDER — ACETAMINOPHEN 500 MG
1000 TABLET ORAL ONCE
Qty: 0 | Refills: 0 | Status: COMPLETED | OUTPATIENT
Start: 2018-07-20 | End: 2018-07-20

## 2018-07-20 RX ORDER — OXYCODONE HYDROCHLORIDE 5 MG/1
1 TABLET ORAL
Qty: 0 | Refills: 0 | COMMUNITY
Start: 2018-07-20

## 2018-07-20 RX ADMIN — HYDROMORPHONE HYDROCHLORIDE 2 MILLIGRAM(S): 2 INJECTION INTRAMUSCULAR; INTRAVENOUS; SUBCUTANEOUS at 10:30

## 2018-07-20 RX ADMIN — HYDROMORPHONE HYDROCHLORIDE 8 MILLIGRAM(S): 2 INJECTION INTRAMUSCULAR; INTRAVENOUS; SUBCUTANEOUS at 20:50

## 2018-07-20 RX ADMIN — Medication 50 MILLIGRAM(S): at 14:48

## 2018-07-20 RX ADMIN — Medication 325 MILLIGRAM(S): at 11:56

## 2018-07-20 RX ADMIN — GABAPENTIN 300 MILLIGRAM(S): 400 CAPSULE ORAL at 06:32

## 2018-07-20 RX ADMIN — HYDROMORPHONE HYDROCHLORIDE 8 MILLIGRAM(S): 2 INJECTION INTRAMUSCULAR; INTRAVENOUS; SUBCUTANEOUS at 22:00

## 2018-07-20 RX ADMIN — OXYCODONE HYDROCHLORIDE 15 MILLIGRAM(S): 5 TABLET ORAL at 12:30

## 2018-07-20 RX ADMIN — HYDROMORPHONE HYDROCHLORIDE 2 MILLIGRAM(S): 2 INJECTION INTRAMUSCULAR; INTRAVENOUS; SUBCUTANEOUS at 23:43

## 2018-07-20 RX ADMIN — PANTOPRAZOLE SODIUM 40 MILLIGRAM(S): 20 TABLET, DELAYED RELEASE ORAL at 06:33

## 2018-07-20 RX ADMIN — CARVEDILOL PHOSPHATE 12.5 MILLIGRAM(S): 80 CAPSULE, EXTENDED RELEASE ORAL at 17:07

## 2018-07-20 RX ADMIN — HYDROMORPHONE HYDROCHLORIDE 2 MILLIGRAM(S): 2 INJECTION INTRAMUSCULAR; INTRAVENOUS; SUBCUTANEOUS at 14:46

## 2018-07-20 RX ADMIN — OXYCODONE HYDROCHLORIDE 15 MILLIGRAM(S): 5 TABLET ORAL at 11:54

## 2018-07-20 RX ADMIN — CARVEDILOL PHOSPHATE 12.5 MILLIGRAM(S): 80 CAPSULE, EXTENDED RELEASE ORAL at 06:32

## 2018-07-20 RX ADMIN — QUETIAPINE FUMARATE 150 MILLIGRAM(S): 200 TABLET, FILM COATED ORAL at 22:00

## 2018-07-20 RX ADMIN — Medication 100 MILLIGRAM(S): at 11:56

## 2018-07-20 RX ADMIN — GABAPENTIN 300 MILLIGRAM(S): 400 CAPSULE ORAL at 14:46

## 2018-07-20 RX ADMIN — TRAMADOL HYDROCHLORIDE 50 MILLIGRAM(S): 50 TABLET ORAL at 17:06

## 2018-07-20 RX ADMIN — HYDROMORPHONE HYDROCHLORIDE 8 MILLIGRAM(S): 2 INJECTION INTRAMUSCULAR; INTRAVENOUS; SUBCUTANEOUS at 12:29

## 2018-07-20 RX ADMIN — HYDROMORPHONE HYDROCHLORIDE 8 MILLIGRAM(S): 2 INJECTION INTRAMUSCULAR; INTRAVENOUS; SUBCUTANEOUS at 17:35

## 2018-07-20 RX ADMIN — Medication 1 PATCH: at 11:55

## 2018-07-20 RX ADMIN — Medication 50 MILLIGRAM(S): at 22:00

## 2018-07-20 RX ADMIN — HYDROMORPHONE HYDROCHLORIDE 2 MILLIGRAM(S): 2 INJECTION INTRAMUSCULAR; INTRAVENOUS; SUBCUTANEOUS at 19:03

## 2018-07-20 RX ADMIN — GABAPENTIN 300 MILLIGRAM(S): 400 CAPSULE ORAL at 22:00

## 2018-07-20 RX ADMIN — Medication 3 MILLILITER(S): at 23:18

## 2018-07-20 RX ADMIN — Medication 1 MILLIGRAM(S): at 23:13

## 2018-07-20 RX ADMIN — Medication 90 MILLIGRAM(S): at 07:37

## 2018-07-20 RX ADMIN — HYDROMORPHONE HYDROCHLORIDE 8 MILLIGRAM(S): 2 INJECTION INTRAMUSCULAR; INTRAVENOUS; SUBCUTANEOUS at 11:59

## 2018-07-20 RX ADMIN — Medication 400 MILLIGRAM(S): at 17:06

## 2018-07-20 RX ADMIN — TRAMADOL HYDROCHLORIDE 50 MILLIGRAM(S): 50 TABLET ORAL at 11:54

## 2018-07-20 RX ADMIN — TRAMADOL HYDROCHLORIDE 50 MILLIGRAM(S): 50 TABLET ORAL at 07:33

## 2018-07-20 RX ADMIN — OXYCODONE HYDROCHLORIDE 15 MILLIGRAM(S): 5 TABLET ORAL at 23:00

## 2018-07-20 RX ADMIN — ENOXAPARIN SODIUM 40 MILLIGRAM(S): 100 INJECTION SUBCUTANEOUS at 06:32

## 2018-07-20 RX ADMIN — HYDROMORPHONE HYDROCHLORIDE 2 MILLIGRAM(S): 2 INJECTION INTRAMUSCULAR; INTRAVENOUS; SUBCUTANEOUS at 15:15

## 2018-07-20 RX ADMIN — Medication 325 MILLIGRAM(S): at 07:39

## 2018-07-20 RX ADMIN — HYDROMORPHONE HYDROCHLORIDE 2 MILLIGRAM(S): 2 INJECTION INTRAMUSCULAR; INTRAVENOUS; SUBCUTANEOUS at 06:31

## 2018-07-20 RX ADMIN — Medication 1000 MILLIGRAM(S): at 17:36

## 2018-07-20 RX ADMIN — SODIUM CHLORIDE 2000 MILLILITER(S): 9 INJECTION INTRAMUSCULAR; INTRAVENOUS; SUBCUTANEOUS at 00:29

## 2018-07-20 RX ADMIN — Medication 1000 MILLIGRAM(S): at 00:32

## 2018-07-20 RX ADMIN — Medication 100 MILLIGRAM(S): at 04:17

## 2018-07-20 RX ADMIN — TRAMADOL HYDROCHLORIDE 50 MILLIGRAM(S): 50 TABLET ORAL at 23:18

## 2018-07-20 RX ADMIN — Medication 250 MILLIGRAM(S): at 06:31

## 2018-07-20 RX ADMIN — SODIUM CHLORIDE 1000 MILLILITER(S): 9 INJECTION INTRAMUSCULAR; INTRAVENOUS; SUBCUTANEOUS at 07:36

## 2018-07-20 RX ADMIN — HYDROMORPHONE HYDROCHLORIDE 2 MILLIGRAM(S): 2 INJECTION INTRAMUSCULAR; INTRAVENOUS; SUBCUTANEOUS at 11:00

## 2018-07-20 RX ADMIN — HYDROMORPHONE HYDROCHLORIDE 8 MILLIGRAM(S): 2 INJECTION INTRAMUSCULAR; INTRAVENOUS; SUBCUTANEOUS at 17:05

## 2018-07-20 RX ADMIN — Medication 50 MILLIGRAM(S): at 06:32

## 2018-07-20 RX ADMIN — HYDROMORPHONE HYDROCHLORIDE 6 MILLIGRAM(S): 2 INJECTION INTRAMUSCULAR; INTRAVENOUS; SUBCUTANEOUS at 07:36

## 2018-07-20 RX ADMIN — Medication 500 MILLIGRAM(S): at 06:31

## 2018-07-20 RX ADMIN — TRAMADOL HYDROCHLORIDE 50 MILLIGRAM(S): 50 TABLET ORAL at 06:33

## 2018-07-20 RX ADMIN — OXYCODONE HYDROCHLORIDE 15 MILLIGRAM(S): 5 TABLET ORAL at 22:00

## 2018-07-20 RX ADMIN — HYDROMORPHONE HYDROCHLORIDE 2 MILLIGRAM(S): 2 INJECTION INTRAMUSCULAR; INTRAVENOUS; SUBCUTANEOUS at 07:33

## 2018-07-20 RX ADMIN — Medication 3 MILLILITER(S): at 06:31

## 2018-07-20 RX ADMIN — TRAMADOL HYDROCHLORIDE 50 MILLIGRAM(S): 50 TABLET ORAL at 17:36

## 2018-07-20 RX ADMIN — TRAMADOL HYDROCHLORIDE 50 MILLIGRAM(S): 50 TABLET ORAL at 12:30

## 2018-07-20 RX ADMIN — HYDROMORPHONE HYDROCHLORIDE 6 MILLIGRAM(S): 2 INJECTION INTRAMUSCULAR; INTRAVENOUS; SUBCUTANEOUS at 08:00

## 2018-07-20 NOTE — DISCHARGE NOTE ADULT - MEDICATION SUMMARY - MEDICATIONS TO STOP TAKING
I will STOP taking the medications listed below when I get home from the hospital:    celecoxib 200 mg oral capsule  -- 1 cap(s) by mouth once a day    aspirin 325 mg oral delayed release tablet  -- 1 tab(s) by mouth 2 times a day

## 2018-07-20 NOTE — PROGRESS NOTE ADULT - SUBJECTIVE AND OBJECTIVE BOX
Patient is a 72y old  Female who presents with a chief complaint of Right hip pain- Right Hip Periprosthetic fracture ORIF (20 Jul 2018 10:54)      HPI:    MEDICATIONS  (STANDING):  ALBUTerol/ipratropium for Nebulization 3 milliLiter(s) Nebulizer every 6 hours  ascorbic acid 500 milliGRAM(s) Oral two times a day  carvedilol 12.5 milliGRAM(s) Oral every 12 hours  enoxaparin Injectable 40 milliGRAM(s) SubCutaneous every 24 hours  ferrous    sulfate 325 milliGRAM(s) Oral three times a day with meals  folic acid 1 milliGRAM(s) Oral daily  gabapentin 300 milliGRAM(s) Oral three times a day  hydrALAZINE 50 milliGRAM(s) Oral every 8 hours  lactated ringers. 1000 milliLiter(s) (75 mL/Hr) IV Continuous <Continuous>  multivitamin 1 Tablet(s) Oral daily  nicotine - 21 mG/24Hr(s) Patch 1 patch Transdermal daily  NIFEdipine XL 90 milliGRAM(s) Oral daily  oxyCODONE  ER Tablet 15 milliGRAM(s) Oral every 12 hours  pantoprazole    Tablet 40 milliGRAM(s) Oral before breakfast  QUEtiapine 150 milliGRAM(s) Oral at bedtime  thiamine 100 milliGRAM(s) Oral daily  traMADol 50 milliGRAM(s) Oral every 6 hours    MEDICATIONS  (PRN):  acetaminophen   Tablet 650 milliGRAM(s) Oral every 6 hours PRN For Temp greater than 38 C (100.4 F)  acetaminophen   Tablet. 650 milliGRAM(s) Oral every 6 hours PRN headache  ALBUTerol    90 MICROgram(s) HFA Inhaler 1 Puff(s) Inhalation every 4 hours PRN Shortness of Breath and/or Wheezing  benzocaine 15 mG/menthol 3.6 mG Lozenge 1 Lozenge Oral every 2 hours PRN Sore Throat  diphenhydrAMINE   Capsule 50 milliGRAM(s) Oral every 6 hours PRN Rash and/or Itching  HYDROmorphone   Tablet 4 milliGRAM(s) Oral every 3 hours PRN Moderate Pain (4 - 6)  HYDROmorphone   Tablet 8 milliGRAM(s) Oral every 3 hours PRN Severe Pain (7 - 10)  HYDROmorphone  Injectable 2 milliGRAM(s) IV Push every 4 hours PRN breakthrough  HYDROmorphone  Injectable 1 milliGRAM(s) IV Push every 4 hours PRN breakthrough pain  LORazepam     Tablet 0.5 milliGRAM(s) Oral every 4 hours PRN Anxiety  LORazepam   Injectable 1 milliGRAM(s) IV Push once PRN extreme anxiety  magnesium hydroxide Suspension 30 milliLiter(s) Oral daily PRN Constipation  ondansetron Injectable 4 milliGRAM(s) IV Push every 6 hours PRN Nausea and/or Vomiting  tiotropium 18 MICROgram(s) Capsule 1 Capsule(s) Inhalation daily PRN shortness of breath and/or wheezing  zaleplon 5 milliGRAM(s) Oral at bedtime PRN Insomnia      Allergies    No Known Allergies    Intolerances        REVIEW OF SYSTEM:  CONSTITUTIONAL: No fever, No change in weight, No fatigue  HEAD: No headache, No dizziness, No recent trauma  EYES: No eye pain, No visual disturbances, No discharge  ENT:  No difficulty hearing, No tinnitus, No vertigo, No sinus pain, No throat pain  NECK: No pain, No stiffness  BREASTS: No pain, No masses, No nipple discharge  RESPIRATORY: No cough, No wheezing, No chills, No hemoptysis, No shortness of breath at rest or exertional shortness of breath  CARDIOVASCULAR: No chest pain, No palpitations, No dizziness, No CHF, No arrhythmia, No cardiomegaly, No leg swelling  GASTROINTESTINAL: No abdominal, No epigastric pain. No nausea, No vomiting, No hematemesis, No diarrhea, No constipation. No melena, No hematochezia. No GERD  GENITOURINARY: No dysuria, No frequency, No hematuria, No incontinence, No nocturia, No hesitancy,  SKIN: No itching, No burning, No rashes, No lesions   LYMPH NODES: No history of enlarged glands  ENDOCRINE: No heat or cold intolerance, No hair loss. No osteoporosis, No thyroid disease  MUSCULOSKELETAL: No joint pain or swelling, No muscle, back, or extremity pain  PSYCHIATRIC: No depression, No anxiety, No mood swings, No difficulty sleeping  HEME/LYMPH: No easy bruising, No anticoagulants, No bleeding disorder, No bleeding gums  ALLERGY AND IMMUNOLOGIC: No hives, No eczema  NEUROLOGICAL: No memory loss, No loss of strength, No numbness, No tremors        VITALS:   T(C): 36.7 (07-20-18 @ 15:35), Max: 36.8 (07-19-18 @ 19:52)  HR: 78 (07-20-18 @ 15:51) (57 - 98)  BP: 123/68 (07-20-18 @ 15:51) (84/44 - 156/71)  RR: 18 (07-20-18 @ 15:35) (18 - 26)  SpO2: 98% (07-20-18 @ 15:35) (95% - 98%)  Wt(kg): --    07-19 @ 07:01  -  07-20 @ 07:00  --------------------------------------------------------  IN: 1815 mL / OUT: 1200 mL / NET: 615 mL    07-20 @ 07:01  -  07-20 @ 18:07  --------------------------------------------------------  IN: 480 mL / OUT: 400 mL / NET: 80 mL        PHYSICAL EXAM:  GENERAL: NAD, well nourished and conversant  HEAD:  Atraumatic  EYES: EOM, PERRLA, conjunctiva pink and sclera white  ENT: No tonsillar erythema, exudates, or enlargement, moist mucous membranes, good dentition, no lesions  NECK: Supple, No JVD, normal thyroid, carotids with normal upstrokes and no bruits  CHEST/LUNG: Clear to auscultation bilaterally, No rales, rhonchi, wheezing, or rubs  HEART: Regular rate and rhythm, No murmurs, rubs, or gallops  ABDOMEN: Soft, nondistended, no masses, guarding, tenderness or rebound, bowel sounds present  EXTREMITIES:  2+ Peripheral Pulses, No clubbing, cyanosis, or edema. No arthritis of shoulders, elbows, hands, hips, knees, ankles, or feet. No DJD C spine, T spine, or L/S spine  LYMPH: No lymphadenopathy noted  SKIN: No rashes or lesions  NERVOUS SYSTEM:  Alert & Oriented X3, normal cognitive function. Motor Strength 5/5 right upper and right lower.  5/5 left upper and left lower extremities, DTRs 2+ intact and symmetric    LABS:                          8.4    8.19  )-----------( 344      ( 20 Jul 2018 08:11 )             26.6     07-20    138  |  103  |  20  ----------------------------<  102<H>  4.4   |  26  |  0.93  07-19    139  |  104  |  20  ----------------------------<  141<H>  5.1   |  25  |  0.72  07-19    140  |  105  |  22  ----------------------------<  98  4.6   |  27  |  0.83    Ca    7.9<L>      20 Jul 2018 07:08  Ca    7.8<L>      19 Jul 2018 15:09  Ca    8.7      19 Jul 2018 04:57      CAPILLARY BLOOD GLUCOSE          RADIOLOGY & ADDITIONAL TESTS:      Consultant(s):    Care Discussed with Consultants/Other Providers [ ] YES  [ ] NO Patient is a 72y old  Female who presents with a chief complaint of Right hip pain- Right Hip Periprosthetic fracture ORIF (20 Jul 2018 10:54)      HPI:  Patient with severe pain from periprosthetic fracture repair  Needs to do incentive spirometry for prevention of atelectasis and fever.   Ice applied to operative site.    MEDICATIONS  (STANDING):  ALBUTerol/ipratropium for Nebulization 3 milliLiter(s) Nebulizer every 6 hours  ascorbic acid 500 milliGRAM(s) Oral two times a day  carvedilol 12.5 milliGRAM(s) Oral every 12 hours  enoxaparin Injectable 40 milliGRAM(s) SubCutaneous every 24 hours  ferrous    sulfate 325 milliGRAM(s) Oral three times a day with meals  folic acid 1 milliGRAM(s) Oral daily  gabapentin 300 milliGRAM(s) Oral three times a day  hydrALAZINE 50 milliGRAM(s) Oral every 8 hours  lactated ringers. 1000 milliLiter(s) (75 mL/Hr) IV Continuous <Continuous>  multivitamin 1 Tablet(s) Oral daily  nicotine - 21 mG/24Hr(s) Patch 1 patch Transdermal daily  NIFEdipine XL 90 milliGRAM(s) Oral daily  oxyCODONE  ER Tablet 15 milliGRAM(s) Oral every 12 hours  pantoprazole    Tablet 40 milliGRAM(s) Oral before breakfast  QUEtiapine 150 milliGRAM(s) Oral at bedtime  thiamine 100 milliGRAM(s) Oral daily  traMADol 50 milliGRAM(s) Oral every 6 hours    MEDICATIONS  (PRN):  acetaminophen   Tablet 650 milliGRAM(s) Oral every 6 hours PRN For Temp greater than 38 C (100.4 F)  acetaminophen   Tablet. 650 milliGRAM(s) Oral every 6 hours PRN headache  ALBUTerol    90 MICROgram(s) HFA Inhaler 1 Puff(s) Inhalation every 4 hours PRN Shortness of Breath and/or Wheezing  benzocaine 15 mG/menthol 3.6 mG Lozenge 1 Lozenge Oral every 2 hours PRN Sore Throat  diphenhydrAMINE   Capsule 50 milliGRAM(s) Oral every 6 hours PRN Rash and/or Itching  HYDROmorphone   Tablet 4 milliGRAM(s) Oral every 3 hours PRN Moderate Pain (4 - 6)  HYDROmorphone   Tablet 8 milliGRAM(s) Oral every 3 hours PRN Severe Pain (7 - 10)  HYDROmorphone  Injectable 2 milliGRAM(s) IV Push every 4 hours PRN breakthrough  HYDROmorphone  Injectable 1 milliGRAM(s) IV Push every 4 hours PRN breakthrough pain  LORazepam     Tablet 0.5 milliGRAM(s) Oral every 4 hours PRN Anxiety  LORazepam   Injectable 1 milliGRAM(s) IV Push once PRN extreme anxiety  magnesium hydroxide Suspension 30 milliLiter(s) Oral daily PRN Constipation  ondansetron Injectable 4 milliGRAM(s) IV Push every 6 hours PRN Nausea and/or Vomiting  tiotropium 18 MICROgram(s) Capsule 1 Capsule(s) Inhalation daily PRN shortness of breath and/or wheezing  zaleplon 5 milliGRAM(s) Oral at bedtime PRN Insomnia      Allergies    No Known Allergies    Intolerances      VITALS:   T(C): 36.7 (07-20-18 @ 15:35), Max: 36.8 (07-19-18 @ 19:52)  HR: 78 (07-20-18 @ 15:51) (57 - 98)  BP: 123/68 (07-20-18 @ 15:51) (84/44 - 156/71)  RR: 18 (07-20-18 @ 15:35) (18 - 26)  SpO2: 98% (07-20-18 @ 15:35) (95% - 98%)  Wt(kg): --    07-19 @ 07:01  -  07-20 @ 07:00  --------------------------------------------------------  IN: 1815 mL / OUT: 1200 mL / NET: 615 mL    07-20 @ 07:01  -  07-20 @ 18:07  --------------------------------------------------------  IN: 480 mL / OUT: 400 mL / NET: 80 mL        PHYSICAL EXAM:  GENERAL: NAD, well nourished and conversant  HEAD:  Atraumatic  EYES: EOM, PERRLA, conjunctiva pink and sclera white  ENT: No tonsillar erythema, exudates, or enlargement, moist mucous membranes, good dentition, no lesions  NECK: Supple, No JVD, normal thyroid, carotids with normal upstrokes and no bruits  CHEST/LUNG: Clear to auscultation bilaterally, No rales, rhonchi, wheezing, or rubs  HEART: Regular rate and rhythm, No murmurs, rubs, or gallops  ABDOMEN: Soft, nondistended, no masses, guarding, tenderness or rebound, bowel sounds present  EXTREMITIES:  2+ Peripheral Pulses, No clubbing, cyanosis, or edema. (+) right periprosthetic frature site   LYMPH: No lymphadenopathy noted  SKIN: No rashes or lesions  NERVOUS SYSTEM:  Alert & Oriented X3, normal cognitive function. Motor Strength 5/5 right upper and right lower.  5/5 left upper and left lower extremities, DTRs 2+ intact and symmetric    LABS:                          8.4    8.19  )-----------( 344      ( 20 Jul 2018 08:11 )             26.6     07-20    138  |  103  |  20  ----------------------------<  102<H>  4.4   |  26  |  0.93  07-19    139  |  104  |  20  ----------------------------<  141<H>  5.1   |  25  |  0.72  07-19    140  |  105  |  22  ----------------------------<  98  4.6   |  27  |  0.83    Ca    7.9<L>      20 Jul 2018 07:08  Ca    7.8<L>      19 Jul 2018 15:09  Ca    8.7      19 Jul 2018 04:57      CAPILLARY BLOOD GLUCOSE          RADIOLOGY & ADDITIONAL TESTS:      Consultant(s):    Care Discussed with Consultants/Other Providers [ ] YES  [ ] NO

## 2018-07-20 NOTE — OCCUPATIONAL THERAPY INITIAL EVALUATION ADULT - PERTINENT HX OF CURRENT PROBLEM, REHAB EVAL
71 yo F presents from [facility] rehab c/o R hip pain. Pt states she has been unable to ambulate for the past few days. Denies any new falls/trauma. Pt had revision R USAMA on 6/30/18 by Dr. Be for a periprosthetic R femur fracture. Pt was having continued pain which significantly worsened a few days ago. Denies fever/chills. Denies numbness/tingling. No other complaints at this time. See below 71 yo F presents from [facility] rehab c/o R hip pain. Pt states she has been unable to ambulate for the past few days. Denies any new falls/trauma. Pt had revision R USAMA on 6/30/18 by Dr. Be for a periprosthetic R femur fracture. Pt was having continued pain which significantly worsened a few days ago. Denies fever/chills. Denies numbness/tingling. No other complaints at this time. Pt s/p ORIF R periprosthetic fracture See below

## 2018-07-20 NOTE — DISCHARGE NOTE ADULT - CARE PROVIDER_API CALL
Mychal Cornell (MD), Orthopaedic Surgery  [address] [place] [address]  Phone: [phone]  Fax: [phone] Mychal Cornell), Orthopaedic Surgery  [address] [facility] [address]  [place] [address]  Phone: [phone]  Fax: [phone]    Artemio Quinones), Geriatric Medicine; Internal Medicine  [address] [place], [place] [id number]  Phone: [phone]  Fax: [phone]

## 2018-07-20 NOTE — DISCHARGE NOTE ADULT - PLAN OF CARE
improved ADL's, pain control Please follow up with Dr. Cornell after your discharge from Rehab.  PT-partial weight bearing with  posterior precautions.  Lovenox daily x 6 weeks total for dvt prevention.  Keep dressing clean and intact, have doctor remove staples post op day 14 and apply steristrips if applicable. Please follow up with Dr. Cornell after your discharge from Rehab.  PT-partial weight bearing with  posterior precautions.  Lovenox daily x 6 weeks total for dvt prevention.  Keep dressing clean and intact, remove postop day 14, if staples or sutures present have doctor remove and apply steristrips. physical therapy to assist with exercises and help increase endurance.

## 2018-07-20 NOTE — DISCHARGE NOTE ADULT - MEDICATION SUMMARY - MEDICATIONS TO CHANGE
I will SWITCH the dose or number of times a day I take the medications listed below when I get home from the hospital:    HYDROmorphone 4 mg oral tablet  -- 1 tab(s) by mouth every 3 hours, As needed, Moderate Pain (4 - 6)    HYDROmorphone 2 mg oral tablet  -- 3 tab(s) by mouth every 3 hours, As needed, Severe Pain (7 - 10)    oxyCODONE 10 mg oral tablet, extended release  -- 1 tab(s) by mouth every 12 hours    traMADol 50 mg oral tablet  -- 1 tab(s) by mouth every 8 hours

## 2018-07-20 NOTE — DISCHARGE NOTE ADULT - CARE PROVIDERS DIRECT ADDRESSES
,[email or web address],DirectAddress_Unknown

## 2018-07-20 NOTE — DISCHARGE NOTE ADULT - NS AS ACTIVITY OBS
Stairs allowed/Do not make important decisions/Do not drive or operate machinery/No Heavy lifting/straining/Showering allowed/stairs/shower with assistance/Walking-Indoors allowed/Walking-Outdoors allowed Showering allowed/Do not make important decisions/Do not drive or operate machinery/Stairs allowed/Patient may shower, limit direct water to dressing, if wet pat dry/Walking-Indoors allowed/No Heavy lifting/straining/Walking-Outdoors allowed

## 2018-07-20 NOTE — PHYSICAL THERAPY INITIAL EVALUATION ADULT - BED MOBILITY TRAINING, PT EVAL
GOAL: Pt will perform all bed mobility independently in 2 weeks GOAL: Pt will perform all bed mobility independently in 4 weeks

## 2018-07-20 NOTE — PHYSICAL THERAPY INITIAL EVALUATION ADULT - TRANSFER SAFETY CONCERNS NOTED: SIT/STAND, REHAB EVAL
decreased weight-shifting ability/inability to maintain weight-bearing restrictions w/o assist/decreased sequencing ability

## 2018-07-20 NOTE — DISCHARGE NOTE ADULT - ADDITIONAL INSTRUCTIONS
Please follow up with Dr. Cornell and your primary care physician on discharge. Recommend follow up with medical MD, within next 4 weeks.

## 2018-07-20 NOTE — PHYSICAL THERAPY INITIAL EVALUATION ADULT - TRANSFER TRAINING, PT EVAL
GOAL: Pt will transfer sit to/from stand with RW independently in 2 weeks GOAL: Pt will transfer sit to/from stand with RW independently in 4 weeks

## 2018-07-20 NOTE — PROGRESS NOTE ADULT - SUBJECTIVE AND OBJECTIVE BOX
Ortho Progress Note    S: Patient seen and examined. No acute events overnight. Pain well controlled with current regimen. Denies lightheadedness/dizziness, CP/SOB. Tolerating diet.   Patient was slightly hypotensive overnight, and was given a bolus and her blood pressure came up.  Patient is expressing wishes to die, since she does not think she is going to do well or walk again. Pt asked if she wanted to speak to someone but declined.       O:  Physical Exam:  Gen: Laying in bed, NAD, alert and oriented.   Resp: Unlabored breathing  Ext: EHL/FHL/TA/Sol intact          + SILT DP/SP/MCDANIEL/Sa          +DP, extremity WWP    Vital Signs Last 24 Hrs  T(C): 36.8 (20 Jul 2018 05:00), Max: 36.8 (19 Jul 2018 08:58)  T(F): 98.2 (20 Jul 2018 05:00), Max: 98.2 (19 Jul 2018 08:58)  HR: 75 (20 Jul 2018 05:00) (51 - 75)  BP: 131/68 (20 Jul 2018 05:00) (84/44 - 169/77)  BP(mean): 102 (19 Jul 2018 18:45) (84 - 102)  RR: 18 (20 Jul 2018 05:00) (14 - 26)  SpO2: 98% (20 Jul 2018 05:00) (92% - 100%)                          9.4    7.6   )-----------( 292      ( 19 Jul 2018 15:09 )             28.4                         10.0   6.0   )-----------( 293      ( 19 Jul 2018 04:57 )             29.7       07-19    139  |  104  |  20  ----------------------------<  141<H>  5.1   |  25  |  0.72        PT/INR - ( 19 Jul 2018 04:57 )   PT: 12.0 sec;   INR: 1.10 ratio         PTT - ( 19 Jul 2018 04:57 )  PTT:32.8 sec

## 2018-07-20 NOTE — PHYSICAL THERAPY INITIAL EVALUATION ADULT - PERTINENT HX OF CURRENT PROBLEM, REHAB EVAL
73 y/o female presents from [facility] with c/o R hip pain. Pt with h/o revision R USAMA (6/30/18) by Dr. Be for a periprosthetic R femur fracture that occured s/p fall. At rehab, pt had nontraumatic separation of distal stabilization. Pt now presents s/p ORIF of B1 Periprosthetic fracture.

## 2018-07-20 NOTE — OCCUPATIONAL THERAPY INITIAL EVALUATION ADULT - ADDITIONAL COMMENTS
Xray R Femur: Limited exam of the femur without lateral view. Status post revision right total hip arthroplasty with anatomic alignment. Redemonstration of periprosthetic comminuted proximal femur fracture with displacement of fragments. Vascular calcifications.

## 2018-07-20 NOTE — PHYSICAL THERAPY INITIAL EVALUATION ADULT - ADDITIONAL COMMENTS
Pt admitted from [facility]. Prior to that pt lives with spouse in a private home. Pt owns a RW. Pt has a flight to bedroom, has a chair lift. Pt states she was independent with ADLs and ambulation prior to rehab. Pt admitted from [facility]. Prior to that pt lives with spouse in a private home, steps to enter (+chair lift). Pt owns a RW. Pt has a flight to bedroom, has a chair lift. Pt states she was independent with ADLs and ambulation prior to rehab.

## 2018-07-20 NOTE — DISCHARGE NOTE ADULT - MEDICATION SUMMARY - MEDICATIONS TO TAKE
I will START or STAY ON the medications listed below when I get home from the hospital:    budesonide 0.5 mg/2 mL inhalation suspension  --  inhaled   -- Indication: For asthma    acetaminophen 325 mg oral tablet  -- 2 tab(s) by mouth every 6 hours, As needed, For Temp greater than 38 C (100.4 F)  -- Indication: For temp    acetaminophen 325 mg oral tablet  -- 2 tab(s) by mouth every 6 hours, As needed, headache  -- Indication: For pain    HYDROmorphone 4 mg oral tablet  -- 1 tab(s) by mouth every 3 hours, As needed, Moderate Pain (4 - 6)  -- Indication: For pain    HYDROmorphone 8 mg oral tablet  -- 1 tab(s) by mouth every 3 hours, As needed, Severe Pain (7 - 10)  -- Indication: For pain    oxyCODONE 15 mg oral tablet, extended release  -- 1 tab(s) by mouth every 12 hours  -- Indication: For pain    traMADol 50 mg oral tablet  -- 1 tab(s) by mouth every 6 hours  -- Indication: For pain    enoxaparin  -- 40 milligram(s) subcutaneous once a day x 6 weeks total for dvt prevention  -- Indication: For dvt prevention    gabapentin 300 mg oral capsule  -- 1 cap(s) by mouth 3 times a day  -- Indication: For pain    QUEtiapine 50 mg oral tablet  -- 1 tab(s) by mouth every 6 hours, As needed, anxiety/insomnia  -- Indication: For Anxiety    QUEtiapine 50 mg oral tablet  -- 3 tab(s) by mouth once a day (at bedtime)  -- Indication: For insomnia    carvedilol 12.5 mg oral tablet  -- 1 tab(s) by mouth every 12 hours  -- Indication: For blood pressure    ipratropium-albuterol 0.5 mg-2.5 mg/3 mLinhalation solution  -- 3 milliliter(s) inhaled every 6 hours  -- Indication: For asthma    NIFEdipine 90 mg oral tablet, extended release  -- 1 tab(s) by mouth once a day  -- Indication: For blood pressure    docusate sodium 100 mg oral capsule  -- 1 cap(s) by mouth 2 times a day  -- Indication: For stool softener    senna oral tablet  -- 2 tab(s) by mouth once a day (at bedtime)  -- Indication: For laxative    polyethylene glycol 3350 oral powder for reconstitution  -- 17 gram(s) by mouth once a day  -- Indication: For laxative    pantoprazole 40 mg oral delayed release tablet  -- 1 tab(s) by mouth once a day  -- Indication: For GI prevention    nicotine 21 mg/24 hr transdermal film, extended release  -- 1 patch by transdermal patch once a day  -- Indication: For anti-smoking    hydrALAZINE 50 mg oral tablet  -- 1 tab(s) by mouth every 8 hours  -- Indication: For blood pressure    thiamine 100 mg oral tablet  -- 1 tab(s) by mouth once a day  -- Indication: For supplement    ascorbic acid 500 mg oral tablet  -- 1 tab(s) by mouth 2 times a day  -- Indication: For supplement    folic acid 1 mg oral tablet  -- 1 tab(s) by mouth once a day  -- Indication: For supplement I will START or STAY ON the medications listed below when I get home from the hospital:    budesonide 0.5 mg/2 mL inhalation suspension  --  inhaled   -- Indication: For asthma    acetaminophen 325 mg oral tablet  -- 2 tab(s) by mouth every 6 hours, As needed, For Temp greater than 38 C (100.4 F)  -- Indication: For temp    acetaminophen 325 mg oral tablet  -- 2 tab(s) by mouth every 6 hours, As needed, headache  -- Indication: For pain    HYDROmorphone 4 mg oral tablet  -- 1 tab(s) by mouth every 3 hours, As needed, Moderate Pain (4 - 6)  -- Indication: For pain    HYDROmorphone 8 mg oral tablet  -- 1 tab(s) by mouth every 3 hours, As needed, Severe Pain (7 - 10)  -- Indication: For pain    traMADol 50 mg oral tablet  -- 1 tab(s) by mouth every 6 hours  -- Indication: For pain    oxyCODONE 20 mg oral tablet, extended release  -- 1 tab(s) by mouth every 12 hours  -- Indication: For Pain    enoxaparin  -- 40 milligram(s) subcutaneous once a day x 6 weeks total for dvt prevention  -- Indication: For dvt prevention    gabapentin 300 mg oral capsule  -- 1 cap(s) by mouth 3 times a day  -- Indication: For pain    QUEtiapine 50 mg oral tablet  -- 1 tab(s) by mouth every 6 hours, As needed, anxiety/insomnia  -- Indication: For Anxiety    QUEtiapine 50 mg oral tablet  -- 3 tab(s) by mouth once a day (at bedtime)  -- Indication: For insomnia    carvedilol 12.5 mg oral tablet  -- 1 tab(s) by mouth every 12 hours  -- Indication: For blood pressure    ipratropium-albuterol 0.5 mg-2.5 mg/3 mLinhalation solution  -- 3 milliliter(s) inhaled every 6 hours  -- Indication: For asthma    NIFEdipine 90 mg oral tablet, extended release  -- 1 tab(s) by mouth once a day  -- Indication: For blood pressure    docusate sodium 100 mg oral capsule  -- 1 cap(s) by mouth 2 times a day  -- Indication: For stool softener    senna oral tablet  -- 2 tab(s) by mouth once a day (at bedtime)  -- Indication: For laxative    polyethylene glycol 3350 oral powder for reconstitution  -- 17 gram(s) by mouth once a day  -- Indication: For laxative    pantoprazole 40 mg oral delayed release tablet  -- 1 tab(s) by mouth once a day  -- Indication: For GI prevention    nicotine 21 mg/24 hr transdermal film, extended release  -- 1 patch by transdermal patch once a day  -- Indication: For anti-smoking    hydrALAZINE 50 mg oral tablet  -- 1 tab(s) by mouth every 8 hours  -- Indication: For blood pressure    thiamine 100 mg oral tablet  -- 1 tab(s) by mouth once a day  -- Indication: For supplement    ascorbic acid 500 mg oral tablet  -- 1 tab(s) by mouth 2 times a day  -- Indication: For supplement    folic acid 1 mg oral tablet  -- 1 tab(s) by mouth once a day  -- Indication: For supplement

## 2018-07-20 NOTE — DISCHARGE NOTE ADULT - PATIENT PORTAL LINK FT
You can access the [facility], offered by [facility], by registering with the following website: [email or web address]

## 2018-07-20 NOTE — DISCHARGE NOTE ADULT - HOSPITAL COURSE
This is a 71 y/o female admitted to [facility] for a left hip periprosthetic fracture.  Patient was medically cleared for surgery and underwent an uncomplicated ORIF of her R hip periprosthetic fracture.  Prior to surgery, patient received 2 units of packed red blood cells for optimization in setting of anemia.  Patient was evaluated and treated by Physical Therapy, and recommended patient for subacute rehab.  Patient will be discharged when rehab bed available and medically cleared. History of Present Illness: 	  72F presents from [facility] rehab c/o R hip pain. Pt states she has been unable to ambulate for the past few days. Denies any new falls/trauma. Pt had revision R USAMA on 6/30/18 by Dr. Be for a periprosthetic R femur fracture. Pt was having continued pain which significantly worsened a few days ago. Denies fever/chills. Denies numbness/tingling. No other complaints at this time.     Review of Systems:  Review of Systems: CONSTITUTIONAL: No fever or chills  HEENT:  No headache, no sore throat  RESPIRATORY: No cough, wheezing, or shortness of breath  CARDIOVASCULAR: No chest pain, palpitations, or leg swelling  GASTROINTESTINAL: No nausea, vomiting, or diarrhea  GENITOURINARY: No dysuria, frequency, or hematuria  NEUROLOGICAL: no focal weakness or dizziness  SKIN:  No rashes or lesions   MUSCULOSKELETAL: no myalgias  PSYCHIATRIC: No depression or anxiety	      Allergies and Intolerances:        Allergies:  	No Known Allergies:     Home Medications:   * Patient Currently Takes Medications as of 03-Jul-2018 09:12 documented in Structured Notes  · 	budesonide 0.5 mg/2 mL inhalation suspension:  inhaled   · 	hydrALAZINE 50 mg oral tablet: 1 tab(s) orally every 8 hours  · 	nicotine 21 mg/24 hr transdermal film, extended release: 1 patch transdermal once a day  · 	guaiFENesin 100 mg/5 mL oral liquid: 10 milliliter(s) orally every 6 hours, As needed, Cough  · 	NIFEdipine 90 mg oral tablet, extended release: 1 tab(s) orally once a day  · 	ipratropium-albuterol 0.5 mg-2.5 mg/3 mLinhalation solution: 3 milliliter(s) inhaled every 6 hours  · 	aspirin 325 mg oral delayed release tablet: 1 tab(s) orally 2 times a day  · 	traMADol 50 mg oral tablet: 1 tab(s) orally every 8 hours  · 	oxyCODONE 10 mg oral tablet, extended release: 1 tab(s) orally every 12 hours  · 	HYDROmorphone 2 mg oral tablet: 3 tab(s) orally every 3 hours, As needed, Severe Pain (7 - 10)  · 	HYDROmorphone 4 mg oral tablet: 1 tab(s) orally every 3 hours, As needed, Moderate Pain (4 - 6)  · 	celecoxib 200 mg oral capsule: 1 cap(s) orally once a day  · 	senna oral tablet: 2 tab(s) orally once a day (at bedtime)  · 	docusate sodium 100 mg oral capsule: 1 cap(s) orally 2 times a day  · 	carvedilol 12.5 mg oral tablet: 1 tab(s) orally every 12 hours  · 	gabapentin 300 mg oral capsule: 1 cap(s) orally 3 times a day  · 	pantoprazole 40 mg oral delayed release tablet: 1 tab(s) orally once a day  · 	polyethylene glycol 3350 oral powder for reconstitution: 17 gram(s) orally once a day  · 	QUEtiapine 50 mg oral tablet: 3 tab(s) orally once a day (at bedtime)  · 	QUEtiapine 50 mg oral tablet: 1 tab(s) orally every 6 hours, As needed, anxiety/insomnia  · 	thiamine 100 mg oral tablet: 1 tab(s) orally once a day    .    Patient History:    Past Medical History:  Alcohol abuse    Anxiety    Anxiety    CAD (coronary artery disease)    Coronary artery disease    Emphysema, unspecified    HTN (hypertension)    Hyperlipidemia    Hypertension    Migraine    Migraine    Pain of right hip joint    Seizure    Stented coronary artery.     Past Surgical History:  S/P bladder repair    Status post total hip replacement, right  5/21/18.    This is a 73 y/o female admitted to [facility] for a left hip periprosthetic fracture Failed hardware fro previous ORIF.  Patient was medically cleared for surgery and underwent an uncomplicated ORIF of her R hip periprosthetic fracture.  Prior to surgery, patient received 2 units of packed red blood cells for optimization in setting of anemia.  Patient was evaluated and treated by Physical Therapy, and recommended patient for subacute rehab. Post surgical anemia transfused PRBC's.  Patient stable for discharged when rehab bed available.

## 2018-07-20 NOTE — OCCUPATIONAL THERAPY INITIAL EVALUATION ADULT - DIAGNOSIS, OT EVAL
Pt demonstrates post Pt demonstrates post op pain, decreased strength, balance, ADLs and functional mobility

## 2018-07-20 NOTE — OCCUPATIONAL THERAPY INITIAL EVALUATION ADULT - LIVES WITH, PROFILE
Pt received from [facility], prior to last hospital admission, pt I in ADLs and ambulation and lives in private home with

## 2018-07-20 NOTE — DISCHARGE NOTE ADULT - CARE PLAN
Principal Discharge DX:	Periprosthetic fracture around internal prosthetic hip joint, initial encounter  Goal:	improved ADL's, pain control  Assessment and plan of treatment:	Please follow up with Dr. Cornell after your discharge from Rehab.  PT-partial weight bearing with  posterior precautions.  Lovenox daily x 6 weeks total for dvt prevention.  Keep dressing clean and intact, have doctor remove staples post op day 14 and apply steristrips if applicable. Principal Discharge DX:	Periprosthetic fracture around internal prosthetic hip joint, initial encounter  Goal:	improved ADL's, pain control  Assessment and plan of treatment:	Please follow up with Dr. Cornell after your discharge from Rehab.  PT-partial weight bearing with  posterior precautions.  Lovenox daily x 6 weeks total for dvt prevention.  Keep dressing clean and intact, remove postop day 14, if staples or sutures present have doctor remove and apply steristrips. physical therapy to assist with exercises and help increase endurance.

## 2018-07-20 NOTE — PHYSICAL THERAPY INITIAL EVALUATION ADULT - GAIT TRAINING, PT EVAL
GOAL: Pt will ambulate 150 feet with RW independently in 2 weeks GOAL: Pt will ambulate 100 feet with RW independently in 4 weeks

## 2018-07-21 DIAGNOSIS — D50.0 IRON DEFICIENCY ANEMIA SECONDARY TO BLOOD LOSS (CHRONIC): ICD-10-CM

## 2018-07-21 DIAGNOSIS — F41.9 ANXIETY DISORDER, UNSPECIFIED: ICD-10-CM

## 2018-07-21 DIAGNOSIS — S72.8X1A OTHER FRACTURE OF RIGHT FEMUR, INITIAL ENCOUNTER FOR CLOSED FRACTURE: ICD-10-CM

## 2018-07-21 LAB
ANION GAP SERPL CALC-SCNC: 9 MMOL/L — SIGNIFICANT CHANGE UP (ref 5–17)
BUN SERPL-MCNC: 19 MG/DL — SIGNIFICANT CHANGE UP (ref 7–23)
CALCIUM SERPL-MCNC: 8 MG/DL — LOW (ref 8.4–10.5)
CHLORIDE SERPL-SCNC: 102 MMOL/L — SIGNIFICANT CHANGE UP (ref 96–108)
CO2 SERPL-SCNC: 26 MMOL/L — SIGNIFICANT CHANGE UP (ref 22–31)
CREAT SERPL-MCNC: 0.82 MG/DL — SIGNIFICANT CHANGE UP (ref 0.5–1.3)
CULTURE RESULTS: SIGNIFICANT CHANGE UP
CULTURE RESULTS: SIGNIFICANT CHANGE UP
GLUCOSE SERPL-MCNC: 100 MG/DL — HIGH (ref 70–99)
HCT VFR BLD CALC: 22.3 % — LOW (ref 34.5–45)
HGB BLD-MCNC: 6.9 G/DL — CRITICAL LOW (ref 11.5–15.5)
MCHC RBC-ENTMCNC: 29.2 PG — SIGNIFICANT CHANGE UP (ref 27–34)
MCHC RBC-ENTMCNC: 30.9 GM/DL — LOW (ref 32–36)
MCV RBC AUTO: 94.5 FL — SIGNIFICANT CHANGE UP (ref 80–100)
PLATELET # BLD AUTO: 281 K/UL — SIGNIFICANT CHANGE UP (ref 150–400)
POTASSIUM SERPL-MCNC: 4.4 MMOL/L — SIGNIFICANT CHANGE UP (ref 3.5–5.3)
POTASSIUM SERPL-SCNC: 4.4 MMOL/L — SIGNIFICANT CHANGE UP (ref 3.5–5.3)
RBC # BLD: 2.36 M/UL — LOW (ref 3.8–5.2)
RBC # FLD: 15.7 % — HIGH (ref 10.3–14.5)
SODIUM SERPL-SCNC: 137 MMOL/L — SIGNIFICANT CHANGE UP (ref 135–145)
SPECIMEN SOURCE: SIGNIFICANT CHANGE UP
SPECIMEN SOURCE: SIGNIFICANT CHANGE UP
WBC # BLD: 7.57 K/UL — SIGNIFICANT CHANGE UP (ref 3.8–10.5)
WBC # FLD AUTO: 7.57 K/UL — SIGNIFICANT CHANGE UP (ref 3.8–10.5)

## 2018-07-21 RX ORDER — OXYCODONE HYDROCHLORIDE 5 MG/1
20 TABLET ORAL EVERY 12 HOURS
Qty: 0 | Refills: 0 | Status: DISCONTINUED | OUTPATIENT
Start: 2018-07-21 | End: 2018-07-24

## 2018-07-21 RX ORDER — ACETAMINOPHEN 500 MG
1000 TABLET ORAL ONCE
Qty: 0 | Refills: 0 | Status: COMPLETED | OUTPATIENT
Start: 2018-07-21 | End: 2018-07-22

## 2018-07-21 RX ORDER — FUROSEMIDE 40 MG
20 TABLET ORAL ONCE
Qty: 0 | Refills: 0 | Status: COMPLETED | OUTPATIENT
Start: 2018-07-21 | End: 2018-07-21

## 2018-07-21 RX ORDER — ACETAMINOPHEN 500 MG
1000 TABLET ORAL ONCE
Qty: 0 | Refills: 0 | Status: COMPLETED | OUTPATIENT
Start: 2018-07-21 | End: 2018-07-21

## 2018-07-21 RX ORDER — FUROSEMIDE 40 MG
20 TABLET ORAL ONCE
Qty: 0 | Refills: 0 | Status: COMPLETED | OUTPATIENT
Start: 2018-07-21 | End: 2019-06-19

## 2018-07-21 RX ADMIN — TRAMADOL HYDROCHLORIDE 50 MILLIGRAM(S): 50 TABLET ORAL at 00:25

## 2018-07-21 RX ADMIN — HYDROMORPHONE HYDROCHLORIDE 1 MILLIGRAM(S): 2 INJECTION INTRAMUSCULAR; INTRAVENOUS; SUBCUTANEOUS at 20:58

## 2018-07-21 RX ADMIN — Medication 50 MILLIGRAM(S): at 06:24

## 2018-07-21 RX ADMIN — HYDROMORPHONE HYDROCHLORIDE 1 MILLIGRAM(S): 2 INJECTION INTRAMUSCULAR; INTRAVENOUS; SUBCUTANEOUS at 12:00

## 2018-07-21 RX ADMIN — QUETIAPINE FUMARATE 150 MILLIGRAM(S): 200 TABLET, FILM COATED ORAL at 23:05

## 2018-07-21 RX ADMIN — HYDROMORPHONE HYDROCHLORIDE 1 MILLIGRAM(S): 2 INJECTION INTRAMUSCULAR; INTRAVENOUS; SUBCUTANEOUS at 16:25

## 2018-07-21 RX ADMIN — TRAMADOL HYDROCHLORIDE 50 MILLIGRAM(S): 50 TABLET ORAL at 06:56

## 2018-07-21 RX ADMIN — HYDROMORPHONE HYDROCHLORIDE 1 MILLIGRAM(S): 2 INJECTION INTRAMUSCULAR; INTRAVENOUS; SUBCUTANEOUS at 12:15

## 2018-07-21 RX ADMIN — Medication 500 MILLIGRAM(S): at 06:24

## 2018-07-21 RX ADMIN — HYDROMORPHONE HYDROCHLORIDE 8 MILLIGRAM(S): 2 INJECTION INTRAMUSCULAR; INTRAVENOUS; SUBCUTANEOUS at 14:07

## 2018-07-21 RX ADMIN — TRAMADOL HYDROCHLORIDE 50 MILLIGRAM(S): 50 TABLET ORAL at 17:55

## 2018-07-21 RX ADMIN — HYDROMORPHONE HYDROCHLORIDE 1 MILLIGRAM(S): 2 INJECTION INTRAMUSCULAR; INTRAVENOUS; SUBCUTANEOUS at 21:15

## 2018-07-21 RX ADMIN — OXYCODONE HYDROCHLORIDE 20 MILLIGRAM(S): 5 TABLET ORAL at 18:25

## 2018-07-21 RX ADMIN — HYDROMORPHONE HYDROCHLORIDE 8 MILLIGRAM(S): 2 INJECTION INTRAMUSCULAR; INTRAVENOUS; SUBCUTANEOUS at 20:25

## 2018-07-21 RX ADMIN — HYDROMORPHONE HYDROCHLORIDE 8 MILLIGRAM(S): 2 INJECTION INTRAMUSCULAR; INTRAVENOUS; SUBCUTANEOUS at 19:51

## 2018-07-21 RX ADMIN — GABAPENTIN 300 MILLIGRAM(S): 400 CAPSULE ORAL at 06:24

## 2018-07-21 RX ADMIN — HYDROMORPHONE HYDROCHLORIDE 2 MILLIGRAM(S): 2 INJECTION INTRAMUSCULAR; INTRAVENOUS; SUBCUTANEOUS at 00:25

## 2018-07-21 RX ADMIN — TRAMADOL HYDROCHLORIDE 50 MILLIGRAM(S): 50 TABLET ORAL at 18:25

## 2018-07-21 RX ADMIN — Medication 100 MILLIGRAM(S): at 13:52

## 2018-07-21 RX ADMIN — Medication 20 MILLIGRAM(S): at 18:05

## 2018-07-21 RX ADMIN — Medication 1 TABLET(S): at 13:52

## 2018-07-21 RX ADMIN — Medication 1 PATCH: at 17:18

## 2018-07-21 RX ADMIN — Medication 50 MILLIGRAM(S): at 13:52

## 2018-07-21 RX ADMIN — HYDROMORPHONE HYDROCHLORIDE 8 MILLIGRAM(S): 2 INJECTION INTRAMUSCULAR; INTRAVENOUS; SUBCUTANEOUS at 11:35

## 2018-07-21 RX ADMIN — HYDROMORPHONE HYDROCHLORIDE 8 MILLIGRAM(S): 2 INJECTION INTRAMUSCULAR; INTRAVENOUS; SUBCUTANEOUS at 06:24

## 2018-07-21 RX ADMIN — CARVEDILOL PHOSPHATE 12.5 MILLIGRAM(S): 80 CAPSULE, EXTENDED RELEASE ORAL at 06:24

## 2018-07-21 RX ADMIN — TRAMADOL HYDROCHLORIDE 50 MILLIGRAM(S): 50 TABLET ORAL at 06:24

## 2018-07-21 RX ADMIN — TRAMADOL HYDROCHLORIDE 50 MILLIGRAM(S): 50 TABLET ORAL at 13:52

## 2018-07-21 RX ADMIN — OXYCODONE HYDROCHLORIDE 20 MILLIGRAM(S): 5 TABLET ORAL at 17:55

## 2018-07-21 RX ADMIN — TRAMADOL HYDROCHLORIDE 50 MILLIGRAM(S): 50 TABLET ORAL at 23:05

## 2018-07-21 RX ADMIN — TRAMADOL HYDROCHLORIDE 50 MILLIGRAM(S): 50 TABLET ORAL at 14:22

## 2018-07-21 RX ADMIN — GABAPENTIN 300 MILLIGRAM(S): 400 CAPSULE ORAL at 13:52

## 2018-07-21 RX ADMIN — OXYCODONE HYDROCHLORIDE 15 MILLIGRAM(S): 5 TABLET ORAL at 11:05

## 2018-07-21 RX ADMIN — Medication 0.5 MILLIGRAM(S): at 16:10

## 2018-07-21 RX ADMIN — ENOXAPARIN SODIUM 40 MILLIGRAM(S): 100 INJECTION SUBCUTANEOUS at 06:24

## 2018-07-21 RX ADMIN — PANTOPRAZOLE SODIUM 40 MILLIGRAM(S): 20 TABLET, DELAYED RELEASE ORAL at 06:24

## 2018-07-21 RX ADMIN — Medication 1000 MILLIGRAM(S): at 18:15

## 2018-07-21 RX ADMIN — Medication 500 MILLIGRAM(S): at 17:55

## 2018-07-21 RX ADMIN — GABAPENTIN 300 MILLIGRAM(S): 400 CAPSULE ORAL at 23:05

## 2018-07-21 RX ADMIN — Medication 400 MILLIGRAM(S): at 18:01

## 2018-07-21 RX ADMIN — Medication 3 MILLILITER(S): at 17:57

## 2018-07-21 RX ADMIN — Medication 3 MILLILITER(S): at 13:55

## 2018-07-21 RX ADMIN — HYDROMORPHONE HYDROCHLORIDE 8 MILLIGRAM(S): 2 INJECTION INTRAMUSCULAR; INTRAVENOUS; SUBCUTANEOUS at 14:37

## 2018-07-21 RX ADMIN — Medication 3 MILLILITER(S): at 06:24

## 2018-07-21 RX ADMIN — HYDROMORPHONE HYDROCHLORIDE 1 MILLIGRAM(S): 2 INJECTION INTRAMUSCULAR; INTRAVENOUS; SUBCUTANEOUS at 16:10

## 2018-07-21 RX ADMIN — Medication 90 MILLIGRAM(S): at 06:24

## 2018-07-21 RX ADMIN — OXYCODONE HYDROCHLORIDE 15 MILLIGRAM(S): 5 TABLET ORAL at 11:25

## 2018-07-21 RX ADMIN — TRAMADOL HYDROCHLORIDE 50 MILLIGRAM(S): 50 TABLET ORAL at 23:04

## 2018-07-21 RX ADMIN — HYDROMORPHONE HYDROCHLORIDE 2 MILLIGRAM(S): 2 INJECTION INTRAMUSCULAR; INTRAVENOUS; SUBCUTANEOUS at 07:01

## 2018-07-21 RX ADMIN — HYDROMORPHONE HYDROCHLORIDE 8 MILLIGRAM(S): 2 INJECTION INTRAMUSCULAR; INTRAVENOUS; SUBCUTANEOUS at 06:56

## 2018-07-21 RX ADMIN — HYDROMORPHONE HYDROCHLORIDE 8 MILLIGRAM(S): 2 INJECTION INTRAMUSCULAR; INTRAVENOUS; SUBCUTANEOUS at 11:05

## 2018-07-21 NOTE — PROGRESS NOTE ADULT - SUBJECTIVE AND OBJECTIVE BOX
Post op Day [2 ]    Patient resting, has severe operative leg pain.  No chest pain, SOB, N/V.    T(C): 36.9 (07-21-18 @ 06:21), Max: 36.9 (07-21-18 @ 00:15)  HR: 83 (07-21-18 @ 06:21) (78 - 98)  BP: 125/59 (07-21-18 @ 06:21) (99/53 - 129/74)  RR: 18 (07-21-18 @ 06:21) (18 - 18)  SpO2: 95% (07-21-18 @ 06:21) (95% - 99%)  Wt(kg): --    Exam:  Alert and Oriented, No Acute Distress  R hip aquacels c/d/i, compartments are indurated but compressible, leg warm to touch, leg erythematous near incision site, but consistent with preop appearance  patient c/o RLE pain, and LLE pain in groin down into leg, calf exam limited 2/2 patient pain  +PF/DF/EHL/FHL  SILT  +DP Pulse                       8.4    8.19  )-----------( 344      ( 20 Jul 2018 08:11 )             26.6    07-21    137  |  102  |  19  ----------------------------<  100<H>  4.4   |  26  |  0.82    Ca    8.0<L>      21 Jul 2018 06:55

## 2018-07-21 NOTE — PROGRESS NOTE ADULT - SUBJECTIVE AND OBJECTIVE BOX
Patient is a 72y old  Female who presents with a chief complaint of Right hip pain. [facility] Pt states she has been unable to ambulate for the past few days. Denies any new falls/trauma. Pt had revision R USAMA on 6/30/18 by Dr. Be for a periprosthetic R femur fracture. Pt was having continued pain which significantly worsened a few days ago. Denies fever/chills. Denies numbness/tingling. No other complaints at this time. X-rays show a nonunion  requiring a revision of the periprosthetic fracture repair.  The procedure is scheduled for July 20, 2018.  In addition to moderate pain, the patient has severe anxiety. Patient seen today continue complaint of leg pain. Patient with increased swelling of the right leg. Also found to have a drop in her H&H. Ice applied to leg and Patient is receiving a transfusion.	    MEDICATIONS  (STANDING):  acetaminophen  IVPB. 1000 milliGRAM(s) IV Intermittent once  ALBUTerol/ipratropium for Nebulization 3 milliLiter(s) Nebulizer every 6 hours  ascorbic acid 500 milliGRAM(s) Oral two times a day  carvedilol 12.5 milliGRAM(s) Oral every 12 hours  enoxaparin Injectable 40 milliGRAM(s) SubCutaneous every 24 hours  ferrous    sulfate 325 milliGRAM(s) Oral three times a day with meals  folic acid 1 milliGRAM(s) Oral daily  furosemide    Tablet 20 milliGRAM(s) Oral once  gabapentin 300 milliGRAM(s) Oral three times a day  hydrALAZINE 50 milliGRAM(s) Oral every 8 hours  lactated ringers. 1000 milliLiter(s) (75 mL/Hr) IV Continuous <Continuous>  multivitamin 1 Tablet(s) Oral daily  nicotine - 21 mG/24Hr(s) Patch 1 patch Transdermal daily  NIFEdipine XL 90 milliGRAM(s) Oral daily  oxyCODONE  ER Tablet 20 milliGRAM(s) Oral every 12 hours  pantoprazole    Tablet 40 milliGRAM(s) Oral before breakfast  QUEtiapine 150 milliGRAM(s) Oral at bedtime  thiamine 100 milliGRAM(s) Oral daily  traMADol 50 milliGRAM(s) Oral every 6 hours    MEDICATIONS  (PRN):  acetaminophen   Tablet 650 milliGRAM(s) Oral every 6 hours PRN For Temp greater than 38 C (100.4 F)  acetaminophen   Tablet. 650 milliGRAM(s) Oral every 6 hours PRN headache  ALBUTerol    90 MICROgram(s) HFA Inhaler 1 Puff(s) Inhalation every 4 hours PRN Shortness of Breath and/or Wheezing  benzocaine 15 mG/menthol 3.6 mG Lozenge 1 Lozenge Oral every 2 hours PRN Sore Throat  bisacodyl Suppository 10 milliGRAM(s) Rectal daily PRN If no bowel movement  diphenhydrAMINE   Capsule 50 milliGRAM(s) Oral every 6 hours PRN Rash and/or Itching  HYDROmorphone   Tablet 8 milliGRAM(s) Oral every 3 hours PRN Severe Pain (7 - 10)  HYDROmorphone   Tablet 4 milliGRAM(s) Oral every 3 hours PRN Moderate Pain (4 - 6)  HYDROmorphone  Injectable 1 milliGRAM(s) IV Push every 4 hours PRN breakthrough pain  LORazepam     Tablet 0.5 milliGRAM(s) Oral every 4 hours PRN Anxiety  magnesium hydroxide Suspension 30 milliLiter(s) Oral daily PRN Constipation  ondansetron Injectable 4 milliGRAM(s) IV Push every 6 hours PRN Nausea and/or Vomiting  tiotropium 18 MICROgram(s) Capsule 1 Capsule(s) Inhalation daily PRN shortness of breath and/or wheezing  zaleplon 5 milliGRAM(s) Oral at bedtime PRN Insomnia          VITALS:   T(C): 37.1 (07-21-18 @ 12:31), Max: 37.2 (07-21-18 @ 11:59)  HR: 79 (07-21-18 @ 12:31) (78 - 98)  BP: 118/64 (07-21-18 @ 12:31) (101/64 - 129/74)  RR: 18 (07-21-18 @ 12:31) (18 - 18)  SpO2: 94% (07-21-18 @ 12:31) (94% - 99%)  Wt(kg): --    PHYSICAL EXAM:  GENERAL: NAD, well nourished and conversant  HEAD:  Atraumatic  EYES: EOM, PERRLA, conjunctiva pink and sclera white  ENT: No tonsillar erythema, exudates, or enlargement, moist mucous membranes, good dentition, no lesions  NECK: Supple, No JVD, normal thyroid, carotids with normal upstrokes and no bruits  CHEST/LUNG: soft rales at the left base  HEART: Regular rate and rhythm, No murmurs, rubs, or gallops  ABDOMEN: Soft, nondistended, no masses, guarding, tenderness or rebound, bowel sounds present  EXTREMITIES:  2+ Peripheral Pulses, No clubbing, cyanosis, or edema. (+) right periprosthetic frature site   LYMPH: No lymphadenopathy noted  SKIN: No rashes or lesions  NERVOUS SYSTEM:  Alert & Oriented X3, normal cognitive function. Motor Strength 5/5 right upper and right lower.  5/5 left upper and left lower extremities, DTRs 2+ intact and symmetric    LABS:        CBC Full  -  ( 21 Jul 2018 08:54 )  WBC Count : 7.57 K/uL  Hemoglobin : 6.9 g/dL  Hematocrit : 22.3 %  Platelet Count - Automated : 281 K/uL  Mean Cell Volume : 94.5 fl  Mean Cell Hemoglobin : 29.2 pg  Mean Cell Hemoglobin Concentration : 30.9 gm/dL  Auto Neutrophil # : x  Auto Lymphocyte # : x  Auto Monocyte # : x  Auto Eosinophil # : x  Auto Basophil # : x  Auto Neutrophil % : x  Auto Lymphocyte % : x  Auto Monocyte % : x  Auto Eosinophil % : x  Auto Basophil % : x    07-21    137  |  102  |  19  ----------------------------<  100<H>  4.4   |  26  |  0.82    Ca    8.0<L>      21 Jul 2018 06:55            CAPILLARY BLOOD GLUCOSE          RADIOLOGY & ADDITIONAL TESTS:

## 2018-07-22 LAB
ANION GAP SERPL CALC-SCNC: 9 MMOL/L — SIGNIFICANT CHANGE UP (ref 5–17)
BUN SERPL-MCNC: 23 MG/DL — SIGNIFICANT CHANGE UP (ref 7–23)
CALCIUM SERPL-MCNC: 8.2 MG/DL — LOW (ref 8.4–10.5)
CHLORIDE SERPL-SCNC: 98 MMOL/L — SIGNIFICANT CHANGE UP (ref 96–108)
CO2 SERPL-SCNC: 27 MMOL/L — SIGNIFICANT CHANGE UP (ref 22–31)
CREAT SERPL-MCNC: 0.93 MG/DL — SIGNIFICANT CHANGE UP (ref 0.5–1.3)
GLUCOSE SERPL-MCNC: 127 MG/DL — HIGH (ref 70–99)
HCT VFR BLD CALC: 26.6 % — LOW (ref 34.5–45)
HGB BLD-MCNC: 8.3 G/DL — LOW (ref 11.5–15.5)
MCHC RBC-ENTMCNC: 30.5 PG — SIGNIFICANT CHANGE UP (ref 27–34)
MCHC RBC-ENTMCNC: 31.2 GM/DL — LOW (ref 32–36)
MCV RBC AUTO: 97.8 FL — SIGNIFICANT CHANGE UP (ref 80–100)
PLATELET # BLD AUTO: 302 K/UL — SIGNIFICANT CHANGE UP (ref 150–400)
POTASSIUM SERPL-MCNC: 4.7 MMOL/L — SIGNIFICANT CHANGE UP (ref 3.5–5.3)
POTASSIUM SERPL-SCNC: 4.7 MMOL/L — SIGNIFICANT CHANGE UP (ref 3.5–5.3)
RBC # BLD: 2.72 M/UL — LOW (ref 3.8–5.2)
RBC # FLD: 15.4 % — HIGH (ref 10.3–14.5)
SODIUM SERPL-SCNC: 134 MMOL/L — LOW (ref 135–145)
WBC # BLD: 9.41 K/UL — SIGNIFICANT CHANGE UP (ref 3.8–10.5)
WBC # FLD AUTO: 9.41 K/UL — SIGNIFICANT CHANGE UP (ref 3.8–10.5)

## 2018-07-22 PROCEDURE — 93970 EXTREMITY STUDY: CPT | Mod: 26

## 2018-07-22 RX ORDER — ACETAMINOPHEN 500 MG
1000 TABLET ORAL ONCE
Qty: 0 | Refills: 0 | Status: COMPLETED | OUTPATIENT
Start: 2018-07-22 | End: 2018-07-24

## 2018-07-22 RX ORDER — POLYETHYLENE GLYCOL 3350 17 G/17G
17 POWDER, FOR SOLUTION ORAL DAILY
Qty: 0 | Refills: 0 | Status: DISCONTINUED | OUTPATIENT
Start: 2018-07-22 | End: 2018-07-24

## 2018-07-22 RX ADMIN — TRAMADOL HYDROCHLORIDE 50 MILLIGRAM(S): 50 TABLET ORAL at 06:50

## 2018-07-22 RX ADMIN — MAGNESIUM HYDROXIDE 30 MILLILITER(S): 400 TABLET, CHEWABLE ORAL at 20:46

## 2018-07-22 RX ADMIN — TRAMADOL HYDROCHLORIDE 50 MILLIGRAM(S): 50 TABLET ORAL at 18:20

## 2018-07-22 RX ADMIN — OXYCODONE HYDROCHLORIDE 20 MILLIGRAM(S): 5 TABLET ORAL at 18:20

## 2018-07-22 RX ADMIN — HYDROMORPHONE HYDROCHLORIDE 8 MILLIGRAM(S): 2 INJECTION INTRAMUSCULAR; INTRAVENOUS; SUBCUTANEOUS at 14:21

## 2018-07-22 RX ADMIN — GABAPENTIN 300 MILLIGRAM(S): 400 CAPSULE ORAL at 06:50

## 2018-07-22 RX ADMIN — HYDROMORPHONE HYDROCHLORIDE 8 MILLIGRAM(S): 2 INJECTION INTRAMUSCULAR; INTRAVENOUS; SUBCUTANEOUS at 00:13

## 2018-07-22 RX ADMIN — Medication 500 MILLIGRAM(S): at 06:50

## 2018-07-22 RX ADMIN — Medication 90 MILLIGRAM(S): at 06:50

## 2018-07-22 RX ADMIN — OXYCODONE HYDROCHLORIDE 20 MILLIGRAM(S): 5 TABLET ORAL at 17:47

## 2018-07-22 RX ADMIN — Medication 0.5 MILLIGRAM(S): at 20:44

## 2018-07-22 RX ADMIN — HYDROMORPHONE HYDROCHLORIDE 8 MILLIGRAM(S): 2 INJECTION INTRAMUSCULAR; INTRAVENOUS; SUBCUTANEOUS at 21:44

## 2018-07-22 RX ADMIN — Medication 1 PATCH: at 13:08

## 2018-07-22 RX ADMIN — HYDROMORPHONE HYDROCHLORIDE 1 MILLIGRAM(S): 2 INJECTION INTRAMUSCULAR; INTRAVENOUS; SUBCUTANEOUS at 18:15

## 2018-07-22 RX ADMIN — OXYCODONE HYDROCHLORIDE 20 MILLIGRAM(S): 5 TABLET ORAL at 06:51

## 2018-07-22 RX ADMIN — Medication 3 MILLILITER(S): at 23:47

## 2018-07-22 RX ADMIN — HYDROMORPHONE HYDROCHLORIDE 1 MILLIGRAM(S): 2 INJECTION INTRAMUSCULAR; INTRAVENOUS; SUBCUTANEOUS at 08:52

## 2018-07-22 RX ADMIN — Medication 1 PATCH: at 15:07

## 2018-07-22 RX ADMIN — TRAMADOL HYDROCHLORIDE 50 MILLIGRAM(S): 50 TABLET ORAL at 06:51

## 2018-07-22 RX ADMIN — QUETIAPINE FUMARATE 150 MILLIGRAM(S): 200 TABLET, FILM COATED ORAL at 21:03

## 2018-07-22 RX ADMIN — Medication 3 MILLILITER(S): at 13:07

## 2018-07-22 RX ADMIN — TRAMADOL HYDROCHLORIDE 50 MILLIGRAM(S): 50 TABLET ORAL at 17:47

## 2018-07-22 RX ADMIN — Medication 50 MILLIGRAM(S): at 21:04

## 2018-07-22 RX ADMIN — HYDROMORPHONE HYDROCHLORIDE 1 MILLIGRAM(S): 2 INJECTION INTRAMUSCULAR; INTRAVENOUS; SUBCUTANEOUS at 09:06

## 2018-07-22 RX ADMIN — TRAMADOL HYDROCHLORIDE 50 MILLIGRAM(S): 50 TABLET ORAL at 23:47

## 2018-07-22 RX ADMIN — OXYCODONE HYDROCHLORIDE 20 MILLIGRAM(S): 5 TABLET ORAL at 06:50

## 2018-07-22 RX ADMIN — TRAMADOL HYDROCHLORIDE 50 MILLIGRAM(S): 50 TABLET ORAL at 13:37

## 2018-07-22 RX ADMIN — Medication 100 MILLIGRAM(S): at 13:07

## 2018-07-22 RX ADMIN — CARVEDILOL PHOSPHATE 12.5 MILLIGRAM(S): 80 CAPSULE, EXTENDED RELEASE ORAL at 17:47

## 2018-07-22 RX ADMIN — ENOXAPARIN SODIUM 40 MILLIGRAM(S): 100 INJECTION SUBCUTANEOUS at 06:50

## 2018-07-22 RX ADMIN — Medication 1000 MILLIGRAM(S): at 14:45

## 2018-07-22 RX ADMIN — HYDROMORPHONE HYDROCHLORIDE 8 MILLIGRAM(S): 2 INJECTION INTRAMUSCULAR; INTRAVENOUS; SUBCUTANEOUS at 00:45

## 2018-07-22 RX ADMIN — PANTOPRAZOLE SODIUM 40 MILLIGRAM(S): 20 TABLET, DELAYED RELEASE ORAL at 06:50

## 2018-07-22 RX ADMIN — HYDROMORPHONE HYDROCHLORIDE 1 MILLIGRAM(S): 2 INJECTION INTRAMUSCULAR; INTRAVENOUS; SUBCUTANEOUS at 13:07

## 2018-07-22 RX ADMIN — GABAPENTIN 300 MILLIGRAM(S): 400 CAPSULE ORAL at 13:07

## 2018-07-22 RX ADMIN — Medication 500 MILLIGRAM(S): at 17:47

## 2018-07-22 RX ADMIN — Medication 0.5 MILLIGRAM(S): at 00:13

## 2018-07-22 RX ADMIN — Medication 3 MILLILITER(S): at 17:47

## 2018-07-22 RX ADMIN — HYDROMORPHONE HYDROCHLORIDE 8 MILLIGRAM(S): 2 INJECTION INTRAMUSCULAR; INTRAVENOUS; SUBCUTANEOUS at 14:51

## 2018-07-22 RX ADMIN — HYDROMORPHONE HYDROCHLORIDE 1 MILLIGRAM(S): 2 INJECTION INTRAMUSCULAR; INTRAVENOUS; SUBCUTANEOUS at 13:22

## 2018-07-22 RX ADMIN — HYDROMORPHONE HYDROCHLORIDE 1 MILLIGRAM(S): 2 INJECTION INTRAMUSCULAR; INTRAVENOUS; SUBCUTANEOUS at 22:42

## 2018-07-22 RX ADMIN — HYDROMORPHONE HYDROCHLORIDE 1 MILLIGRAM(S): 2 INJECTION INTRAMUSCULAR; INTRAVENOUS; SUBCUTANEOUS at 17:43

## 2018-07-22 RX ADMIN — GABAPENTIN 300 MILLIGRAM(S): 400 CAPSULE ORAL at 21:03

## 2018-07-22 RX ADMIN — HYDROMORPHONE HYDROCHLORIDE 1 MILLIGRAM(S): 2 INJECTION INTRAMUSCULAR; INTRAVENOUS; SUBCUTANEOUS at 23:42

## 2018-07-22 RX ADMIN — HYDROMORPHONE HYDROCHLORIDE 8 MILLIGRAM(S): 2 INJECTION INTRAMUSCULAR; INTRAVENOUS; SUBCUTANEOUS at 20:44

## 2018-07-22 RX ADMIN — HYDROMORPHONE HYDROCHLORIDE 8 MILLIGRAM(S): 2 INJECTION INTRAMUSCULAR; INTRAVENOUS; SUBCUTANEOUS at 11:22

## 2018-07-22 RX ADMIN — HYDROMORPHONE HYDROCHLORIDE 8 MILLIGRAM(S): 2 INJECTION INTRAMUSCULAR; INTRAVENOUS; SUBCUTANEOUS at 10:48

## 2018-07-22 RX ADMIN — TRAMADOL HYDROCHLORIDE 50 MILLIGRAM(S): 50 TABLET ORAL at 13:07

## 2018-07-22 RX ADMIN — Medication 400 MILLIGRAM(S): at 14:29

## 2018-07-22 NOTE — PROGRESS NOTE ADULT - SUBJECTIVE AND OBJECTIVE BOX
Patient is a 72y old  Female who presents with a chief complaint of Right hip pain. [facility] R [facility]. Pt states she has been unable to ambulate for the past few days. Denies any new falls/trauma. Pt had revision R USAMA on 6/30/18 by Dr. Be for a periprosthetic R femur fracture. Pt was having continued pain which significantly worsened a few days ago. Denies fever/chills. Denies numbness/tingling. No other complaints at this time. X-rays show a nonunion  requiring a revision of the periprosthetic fracture repair.  The procedure is scheduled for July 20, 2018.  In addition to moderate pain, the patient has severe anxiety. Patient seen today continue complaint of leg pain. Patient with increased swelling of the right leg. Also found to have a drop in her H&H. Ice applied to leg and Patient is receiving a transfusion. seen today with continue complaint of pain. Patient is med seeking.      MEDICATIONS  (STANDING):  ALBUTerol/ipratropium for Nebulization 3 milliLiter(s) Nebulizer every 6 hours  ascorbic acid 500 milliGRAM(s) Oral two times a day  carvedilol 12.5 milliGRAM(s) Oral every 12 hours  enoxaparin Injectable 40 milliGRAM(s) SubCutaneous every 24 hours  ferrous    sulfate 325 milliGRAM(s) Oral three times a day with meals  folic acid 1 milliGRAM(s) Oral daily  gabapentin 300 milliGRAM(s) Oral three times a day  hydrALAZINE 50 milliGRAM(s) Oral every 8 hours  lactated ringers. 1000 milliLiter(s) (75 mL/Hr) IV Continuous <Continuous>  multivitamin 1 Tablet(s) Oral daily  nicotine - 21 mG/24Hr(s) Patch 1 patch Transdermal daily  NIFEdipine XL 90 milliGRAM(s) Oral daily  oxyCODONE  ER Tablet 20 milliGRAM(s) Oral every 12 hours  pantoprazole    Tablet 40 milliGRAM(s) Oral before breakfast  polyethylene glycol 3350 17 Gram(s) Oral daily  QUEtiapine 150 milliGRAM(s) Oral at bedtime  thiamine 100 milliGRAM(s) Oral daily  traMADol 50 milliGRAM(s) Oral every 6 hours    MEDICATIONS  (PRN):  acetaminophen   Tablet 650 milliGRAM(s) Oral every 6 hours PRN For Temp greater than 38 C (100.4 F)  acetaminophen   Tablet. 650 milliGRAM(s) Oral every 6 hours PRN headache  acetaminophen  IVPB. 1000 milliGRAM(s) IV Intermittent once PRN breakthrough pain  ALBUTerol    90 MICROgram(s) HFA Inhaler 1 Puff(s) Inhalation every 4 hours PRN Shortness of Breath and/or Wheezing  benzocaine 15 mG/menthol 3.6 mG Lozenge 1 Lozenge Oral every 2 hours PRN Sore Throat  bisacodyl Suppository 10 milliGRAM(s) Rectal daily PRN If no bowel movement  diphenhydrAMINE   Capsule 50 milliGRAM(s) Oral every 6 hours PRN Rash and/or Itching  HYDROmorphone   Tablet 8 milliGRAM(s) Oral every 3 hours PRN Severe Pain (7 - 10)  HYDROmorphone   Tablet 4 milliGRAM(s) Oral every 3 hours PRN Moderate Pain (4 - 6)  HYDROmorphone  Injectable 1 milliGRAM(s) IV Push every 4 hours PRN breakthrough pain  LORazepam     Tablet 0.5 milliGRAM(s) Oral every 4 hours PRN Anxiety  magnesium hydroxide Suspension 30 milliLiter(s) Oral daily PRN Constipation  ondansetron Injectable 4 milliGRAM(s) IV Push every 6 hours PRN Nausea and/or Vomiting  tiotropium 18 MICROgram(s) Capsule 1 Capsule(s) Inhalation daily PRN shortness of breath and/or wheezing  zaleplon 5 milliGRAM(s) Oral at bedtime PRN Insomnia          VITALS:   T(C): 36.4 (07-22-18 @ 16:46), Max: 37.2 (07-22-18 @ 08:53)  HR: 76 (07-22-18 @ 16:46) (72 - 80)  BP: 124/61 (07-22-18 @ 16:46) (94/56 - 143/67)  RR: 18 (07-22-18 @ 16:46) (18 - 18)  SpO2: 95% (07-22-18 @ 16:46) (93% - 96%)  Wt(kg): --    PHYSICAL EXAM:  GENERAL: NAD, well nourished and conversant  HEAD:  Atraumatic  EYES: EOM, PERRLA, conjunctiva pink and sclera white  ENT: No tonsillar erythema, exudates, or enlargement, moist mucous membranes, good dentition, no lesions  NECK: Supple, No JVD, normal thyroid, carotids with normal upstrokes and no bruits  CHEST/LUNG: soft rales at the left base  HEART: Regular rate and rhythm, No murmurs, rubs, or gallops  ABDOMEN: Soft, nondistended, no masses, guarding, tenderness or rebound, bowel sounds present  EXTREMITIES:  2+ Peripheral Pulses, No clubbing, cyanosis, or edema. (+) right periprosthetic frature site   LYMPH: No lymphadenopathy noted  SKIN: No rashes or lesions  NERVOUS SYSTEM:  Alert & Oriented X3, normal cognitive function. Motor Strength 5/5 right upper and right lower.  5/5 left upper and left lower extremities, DTRs 2+ intact and symmetric    LABS:        CBC Full  -  ( 22 Jul 2018 16:28 )  WBC Count : 9.41 K/uL  Hemoglobin : 8.3 g/dL  Hematocrit : 26.6 %  Platelet Count - Automated : 302 K/uL  Mean Cell Volume : 97.8 fl  Mean Cell Hemoglobin : 30.5 pg  Mean Cell Hemoglobin Concentration : 31.2 gm/dL  Auto Neutrophil # : x  Auto Lymphocyte # : x  Auto Monocyte # : x  Auto Eosinophil # : x  Auto Basophil # : x  Auto Neutrophil % : x  Auto Lymphocyte % : x  Auto Monocyte % : x  Auto Eosinophil % : x  Auto Basophil % : x    07-22    134<L>  |  98  |  23  ----------------------------<  127<H>  4.7   |  27  |  0.93    Ca    8.2<L>      22 Jul 2018 13:38            CAPILLARY BLOOD GLUCOSE          RADIOLOGY & ADDITIONAL TESTS:

## 2018-07-22 NOTE — PROGRESS NOTE ADULT - SUBJECTIVE AND OBJECTIVE BOX
Post op Day [3 ]    Patient resting has consistent leg pain with yesterday.  No chest pain, SOB, N/V.    T(C): 36.7 (07-22-18 @ 06:21), Max: 37.2 (07-21-18 @ 11:59)  HR: 78 (07-22-18 @ 06:46) (72 - 79)  BP: 114/56 (07-22-18 @ 06:46) (94/56 - 143/67)  RR: 18 (07-22-18 @ 06:21) (18 - 18)  SpO2: 96% (07-22-18 @ 06:21) (93% - 96%)  Wt(kg): --    Exam:  Alert and Oriented, No Acute Distress  RLE induration, swelling consistent/slightly improved from yesterday.  Ice placed on legs.  Compartments compressible.  As yesterday, patient calf exam limited 2/2 patient reports diffuse pain bilaterally  PF/DF/EHL/FHL  SILT  +DP Pulse                        6.9    7.57  )-----------( 281      ( 21 Jul 2018 08:54 )             22.3    07-21    137  |  102  |  19  ----------------------------<  100<H>  4.4   |  26  |  0.82    Ca    8.0<L>      21 Jul 2018 06:55

## 2018-07-23 LAB
ANION GAP SERPL CALC-SCNC: 10 MMOL/L — SIGNIFICANT CHANGE UP (ref 5–17)
BLD GP AB SCN SERPL QL: NEGATIVE — SIGNIFICANT CHANGE UP
BUN SERPL-MCNC: 23 MG/DL — SIGNIFICANT CHANGE UP (ref 7–23)
CALCIUM SERPL-MCNC: 8.2 MG/DL — LOW (ref 8.4–10.5)
CHLORIDE SERPL-SCNC: 99 MMOL/L — SIGNIFICANT CHANGE UP (ref 96–108)
CO2 SERPL-SCNC: 28 MMOL/L — SIGNIFICANT CHANGE UP (ref 22–31)
CREAT SERPL-MCNC: 0.82 MG/DL — SIGNIFICANT CHANGE UP (ref 0.5–1.3)
GLUCOSE SERPL-MCNC: 98 MG/DL — SIGNIFICANT CHANGE UP (ref 70–99)
HCT VFR BLD CALC: 25.7 % — LOW (ref 34.5–45)
HCT VFR BLD CALC: 29 % — LOW (ref 34.5–45)
HGB BLD-MCNC: 8 G/DL — LOW (ref 11.5–15.5)
HGB BLD-MCNC: 9.2 G/DL — LOW (ref 11.5–15.5)
MCHC RBC-ENTMCNC: 30.3 PG — SIGNIFICANT CHANGE UP (ref 27–34)
MCHC RBC-ENTMCNC: 30.8 PG — SIGNIFICANT CHANGE UP (ref 27–34)
MCHC RBC-ENTMCNC: 31.1 GM/DL — LOW (ref 32–36)
MCHC RBC-ENTMCNC: 31.7 GM/DL — LOW (ref 32–36)
MCV RBC AUTO: 97 FL — SIGNIFICANT CHANGE UP (ref 80–100)
MCV RBC AUTO: 97.3 FL — SIGNIFICANT CHANGE UP (ref 80–100)
PLATELET # BLD AUTO: 259 K/UL — SIGNIFICANT CHANGE UP (ref 150–400)
PLATELET # BLD AUTO: 281 K/UL — SIGNIFICANT CHANGE UP (ref 150–400)
POTASSIUM SERPL-MCNC: 4.7 MMOL/L — SIGNIFICANT CHANGE UP (ref 3.5–5.3)
POTASSIUM SERPL-SCNC: 4.7 MMOL/L — SIGNIFICANT CHANGE UP (ref 3.5–5.3)
RBC # BLD: 2.64 M/UL — LOW (ref 3.8–5.2)
RBC # BLD: 2.99 M/UL — LOW (ref 3.8–5.2)
RBC # FLD: 15.5 % — HIGH (ref 10.3–14.5)
RBC # FLD: 15.5 % — HIGH (ref 10.3–14.5)
RH IG SCN BLD-IMP: POSITIVE — SIGNIFICANT CHANGE UP
SODIUM SERPL-SCNC: 137 MMOL/L — SIGNIFICANT CHANGE UP (ref 135–145)
WBC # BLD: 7.46 K/UL — SIGNIFICANT CHANGE UP (ref 3.8–10.5)
WBC # BLD: 7.72 K/UL — SIGNIFICANT CHANGE UP (ref 3.8–10.5)
WBC # FLD AUTO: 7.46 K/UL — SIGNIFICANT CHANGE UP (ref 3.8–10.5)
WBC # FLD AUTO: 7.72 K/UL — SIGNIFICANT CHANGE UP (ref 3.8–10.5)

## 2018-07-23 RX ORDER — SODIUM CHLORIDE 9 MG/ML
500 INJECTION INTRAMUSCULAR; INTRAVENOUS; SUBCUTANEOUS ONCE
Qty: 0 | Refills: 0 | Status: COMPLETED | OUTPATIENT
Start: 2018-07-23 | End: 2018-07-23

## 2018-07-23 RX ORDER — ACETAMINOPHEN 500 MG
1000 TABLET ORAL ONCE
Qty: 0 | Refills: 0 | Status: COMPLETED | OUTPATIENT
Start: 2018-07-23 | End: 2018-07-23

## 2018-07-23 RX ADMIN — HYDROMORPHONE HYDROCHLORIDE 1 MILLIGRAM(S): 2 INJECTION INTRAMUSCULAR; INTRAVENOUS; SUBCUTANEOUS at 16:15

## 2018-07-23 RX ADMIN — Medication 3 MILLILITER(S): at 11:10

## 2018-07-23 RX ADMIN — CARVEDILOL PHOSPHATE 12.5 MILLIGRAM(S): 80 CAPSULE, EXTENDED RELEASE ORAL at 17:31

## 2018-07-23 RX ADMIN — Medication 1 MILLIGRAM(S): at 11:11

## 2018-07-23 RX ADMIN — ENOXAPARIN SODIUM 40 MILLIGRAM(S): 100 INJECTION SUBCUTANEOUS at 06:03

## 2018-07-23 RX ADMIN — Medication 400 MILLIGRAM(S): at 11:02

## 2018-07-23 RX ADMIN — Medication 90 MILLIGRAM(S): at 06:01

## 2018-07-23 RX ADMIN — Medication 50 MILLIGRAM(S): at 21:27

## 2018-07-23 RX ADMIN — GABAPENTIN 300 MILLIGRAM(S): 400 CAPSULE ORAL at 21:27

## 2018-07-23 RX ADMIN — HYDROMORPHONE HYDROCHLORIDE 1 MILLIGRAM(S): 2 INJECTION INTRAMUSCULAR; INTRAVENOUS; SUBCUTANEOUS at 16:04

## 2018-07-23 RX ADMIN — Medication 50 MILLIGRAM(S): at 06:01

## 2018-07-23 RX ADMIN — POLYETHYLENE GLYCOL 3350 17 GRAM(S): 17 POWDER, FOR SOLUTION ORAL at 11:09

## 2018-07-23 RX ADMIN — Medication 3 MILLILITER(S): at 17:31

## 2018-07-23 RX ADMIN — GABAPENTIN 300 MILLIGRAM(S): 400 CAPSULE ORAL at 14:08

## 2018-07-23 RX ADMIN — Medication 500 MILLIGRAM(S): at 17:32

## 2018-07-23 RX ADMIN — PANTOPRAZOLE SODIUM 40 MILLIGRAM(S): 20 TABLET, DELAYED RELEASE ORAL at 06:02

## 2018-07-23 RX ADMIN — HYDROMORPHONE HYDROCHLORIDE 1 MILLIGRAM(S): 2 INJECTION INTRAMUSCULAR; INTRAVENOUS; SUBCUTANEOUS at 20:28

## 2018-07-23 RX ADMIN — HYDROMORPHONE HYDROCHLORIDE 1 MILLIGRAM(S): 2 INJECTION INTRAMUSCULAR; INTRAVENOUS; SUBCUTANEOUS at 12:35

## 2018-07-23 RX ADMIN — TRAMADOL HYDROCHLORIDE 50 MILLIGRAM(S): 50 TABLET ORAL at 06:06

## 2018-07-23 RX ADMIN — Medication 1 TABLET(S): at 11:11

## 2018-07-23 RX ADMIN — Medication 1 PATCH: at 11:20

## 2018-07-23 RX ADMIN — CARVEDILOL PHOSPHATE 12.5 MILLIGRAM(S): 80 CAPSULE, EXTENDED RELEASE ORAL at 06:01

## 2018-07-23 RX ADMIN — Medication 0.5 MILLIGRAM(S): at 17:39

## 2018-07-23 RX ADMIN — GABAPENTIN 300 MILLIGRAM(S): 400 CAPSULE ORAL at 06:01

## 2018-07-23 RX ADMIN — Medication 3 MILLILITER(S): at 06:03

## 2018-07-23 RX ADMIN — HYDROMORPHONE HYDROCHLORIDE 1 MILLIGRAM(S): 2 INJECTION INTRAMUSCULAR; INTRAVENOUS; SUBCUTANEOUS at 23:33

## 2018-07-23 RX ADMIN — Medication 1 PATCH: at 13:00

## 2018-07-23 RX ADMIN — HYDROMORPHONE HYDROCHLORIDE 8 MILLIGRAM(S): 2 INJECTION INTRAMUSCULAR; INTRAVENOUS; SUBCUTANEOUS at 17:31

## 2018-07-23 RX ADMIN — HYDROMORPHONE HYDROCHLORIDE 1 MILLIGRAM(S): 2 INJECTION INTRAMUSCULAR; INTRAVENOUS; SUBCUTANEOUS at 19:28

## 2018-07-23 RX ADMIN — OXYCODONE HYDROCHLORIDE 20 MILLIGRAM(S): 5 TABLET ORAL at 06:07

## 2018-07-23 RX ADMIN — Medication 500 MILLIGRAM(S): at 06:01

## 2018-07-23 RX ADMIN — HYDROMORPHONE HYDROCHLORIDE 1 MILLIGRAM(S): 2 INJECTION INTRAMUSCULAR; INTRAVENOUS; SUBCUTANEOUS at 12:18

## 2018-07-23 RX ADMIN — HYDROMORPHONE HYDROCHLORIDE 1 MILLIGRAM(S): 2 INJECTION INTRAMUSCULAR; INTRAVENOUS; SUBCUTANEOUS at 07:45

## 2018-07-23 RX ADMIN — HYDROMORPHONE HYDROCHLORIDE 8 MILLIGRAM(S): 2 INJECTION INTRAMUSCULAR; INTRAVENOUS; SUBCUTANEOUS at 18:01

## 2018-07-23 RX ADMIN — OXYCODONE HYDROCHLORIDE 20 MILLIGRAM(S): 5 TABLET ORAL at 17:32

## 2018-07-23 RX ADMIN — Medication 325 MILLIGRAM(S): at 07:39

## 2018-07-23 RX ADMIN — Medication 1000 MILLIGRAM(S): at 11:17

## 2018-07-23 RX ADMIN — QUETIAPINE FUMARATE 150 MILLIGRAM(S): 200 TABLET, FILM COATED ORAL at 21:27

## 2018-07-23 RX ADMIN — HYDROMORPHONE HYDROCHLORIDE 8 MILLIGRAM(S): 2 INJECTION INTRAMUSCULAR; INTRAVENOUS; SUBCUTANEOUS at 21:28

## 2018-07-23 RX ADMIN — HYDROMORPHONE HYDROCHLORIDE 1 MILLIGRAM(S): 2 INJECTION INTRAMUSCULAR; INTRAVENOUS; SUBCUTANEOUS at 07:33

## 2018-07-23 RX ADMIN — TRAMADOL HYDROCHLORIDE 50 MILLIGRAM(S): 50 TABLET ORAL at 11:10

## 2018-07-23 RX ADMIN — HYDROMORPHONE HYDROCHLORIDE 8 MILLIGRAM(S): 2 INJECTION INTRAMUSCULAR; INTRAVENOUS; SUBCUTANEOUS at 22:28

## 2018-07-23 RX ADMIN — Medication 0.5 MILLIGRAM(S): at 21:30

## 2018-07-23 RX ADMIN — SODIUM CHLORIDE 500 MILLILITER(S): 9 INJECTION INTRAMUSCULAR; INTRAVENOUS; SUBCUTANEOUS at 08:30

## 2018-07-23 NOTE — PROGRESS NOTE ADULT - SUBJECTIVE AND OBJECTIVE BOX
Pt seen/examined. Doing well. Pain controlled. No acute overnight complaints or events.    T(C): 36.6 (07-23-18 @ 05:59), Max: 37.2 (07-22-18 @ 08:53)  HR: 77 (07-23-18 @ 05:59) (71 - 80)  BP: 114/53 (07-23-18 @ 05:59) (104/64 - 124/61)  RR: 16 (07-23-18 @ 05:59) (16 - 18)  SpO2: 96% (07-23-18 @ 05:59) (95% - 100%)  Wt(kg): --    - Gen: NAD  - RLE: Aquacel C/D/I; skin firm and indurated, but without erythema or warmth; SILT TN/SPN/DPN/SN; TA/EHL/gs intact    72yFemale s/p R femur PP fx ORIF    - Pain control  - DVT ppx  - OOB/PT  - FU Dopplers

## 2018-07-23 NOTE — PROGRESS NOTE ADULT - SUBJECTIVE AND OBJECTIVE BOX
Patient is a 72y old  Female who presents with a chief complaint of Right hip pain. [facility]. Pt states she has been unable to ambulate for the past few days. Denies any new falls/trauma. Pt had revision R USAMA on 6/30/18 by Dr. Be for a periprosthetic R femur fracture. Pt was having continued pain which significantly worsened a few days ago. Denies fever/chills. Denies numbness/tingling. No other complaints at this time. X-rays show a nonunion  requiring a revision of the periprosthetic fracture repair.  The procedure is scheduled for July 20, 2018.  In addition to moderate pain, the patient has severe anxiety. Patient seen today continue complaint of leg pain. Patient with increased swelling of the right leg. Also found to have a drop in her H&H. Ice applied to leg and Patient is receiving a transfusion. seen today with continue complaint of continue pain at surgical site      MEDICATIONS  (STANDING):  ALBUTerol/ipratropium for Nebulization 3 milliLiter(s) Nebulizer every 6 hours  ascorbic acid 500 milliGRAM(s) Oral two times a day  carvedilol 12.5 milliGRAM(s) Oral every 12 hours  enoxaparin Injectable 40 milliGRAM(s) SubCutaneous every 24 hours  ferrous    sulfate 325 milliGRAM(s) Oral three times a day with meals  folic acid 1 milliGRAM(s) Oral daily  gabapentin 300 milliGRAM(s) Oral three times a day  hydrALAZINE 50 milliGRAM(s) Oral every 8 hours  lactated ringers. 1000 milliLiter(s) (75 mL/Hr) IV Continuous <Continuous>  multivitamin 1 Tablet(s) Oral daily  nicotine - 21 mG/24Hr(s) Patch 1 patch Transdermal daily  NIFEdipine XL 90 milliGRAM(s) Oral daily  oxyCODONE  ER Tablet 20 milliGRAM(s) Oral every 12 hours  pantoprazole    Tablet 40 milliGRAM(s) Oral before breakfast  polyethylene glycol 3350 17 Gram(s) Oral daily  QUEtiapine 150 milliGRAM(s) Oral at bedtime  thiamine 100 milliGRAM(s) Oral daily    MEDICATIONS  (PRN):  acetaminophen   Tablet 650 milliGRAM(s) Oral every 6 hours PRN For Temp greater than 38 C (100.4 F)  acetaminophen   Tablet. 650 milliGRAM(s) Oral every 6 hours PRN headache  acetaminophen  IVPB. 1000 milliGRAM(s) IV Intermittent once PRN breakthrough pain  ALBUTerol    90 MICROgram(s) HFA Inhaler 1 Puff(s) Inhalation every 4 hours PRN Shortness of Breath and/or Wheezing  benzocaine 15 mG/menthol 3.6 mG Lozenge 1 Lozenge Oral every 2 hours PRN Sore Throat  bisacodyl Suppository 10 milliGRAM(s) Rectal daily PRN If no bowel movement  diphenhydrAMINE   Capsule 50 milliGRAM(s) Oral every 6 hours PRN Rash and/or Itching  HYDROmorphone   Tablet 8 milliGRAM(s) Oral every 3 hours PRN Severe Pain (7 - 10)  HYDROmorphone   Tablet 4 milliGRAM(s) Oral every 3 hours PRN Moderate Pain (4 - 6)  HYDROmorphone  Injectable 1 milliGRAM(s) IV Push every 4 hours PRN breakthrough pain  LORazepam     Tablet 0.5 milliGRAM(s) Oral every 4 hours PRN Anxiety  magnesium hydroxide Suspension 30 milliLiter(s) Oral daily PRN Constipation  ondansetron Injectable 4 milliGRAM(s) IV Push every 6 hours PRN Nausea and/or Vomiting  tiotropium 18 MICROgram(s) Capsule 1 Capsule(s) Inhalation daily PRN shortness of breath and/or wheezing  zaleplon 5 milliGRAM(s) Oral at bedtime PRN Insomnia          VITALS:   T(C): 36.7 (07-23-18 @ 17:02), Max: 36.9 (07-23-18 @ 08:37)  HR: 72 (07-23-18 @ 17:02) (69 - 78)  BP: 117/65 (07-23-18 @ 17:02) (83/43 - 117/65)  RR: 16 (07-23-18 @ 17:02) (16 - 18)  SpO2: 96% (07-23-18 @ 17:02) (96% - 100%)  Wt(kg): --    PHYSICAL EXAM:  GENERAL: NAD, well nourished and conversant  HEAD:  Atraumatic  EYES: EOM, PERRLA, conjunctiva pink and sclera white  ENT: No tonsillar erythema, exudates, or enlargement, moist mucous membranes, good dentition, no lesions  NECK: Supple, No JVD, normal thyroid, carotids with normal upstrokes and no bruits  CHEST/LUNG: soft rales at the left base  HEART: Regular rate and rhythm, No murmurs, rubs, or gallops  ABDOMEN: Soft, nondistended, no masses, guarding, tenderness or rebound, bowel sounds present  EXTREMITIES:  2+ Peripheral Pulses, No clubbing, cyanosis, or edema. (+) right periprosthetic frature site   LYMPH: No lymphadenopathy noted  SKIN: No rashes or lesions  NERVOUS SYSTEM:  Alert & Oriented X3, normal cognitive function. Motor Strength 5/5 right upper and right lower.  5/5 left upper and left lower extremities, DTRs 2+ intact and symmetric      LABS:        CBC Full  -  ( 23 Jul 2018 19:02 )  WBC Count : 7.72 K/uL  Hemoglobin : 9.2 g/dL  Hematocrit : 29.0 %  Platelet Count - Automated : 281 K/uL  Mean Cell Volume : 97.0 fl  Mean Cell Hemoglobin : 30.8 pg  Mean Cell Hemoglobin Concentration : 31.7 gm/dL  Auto Neutrophil # : x  Auto Lymphocyte # : x  Auto Monocyte # : x  Auto Eosinophil # : x  Auto Basophil # : x  Auto Neutrophil % : x  Auto Lymphocyte % : x  Auto Monocyte % : x  Auto Eosinophil % : x  Auto Basophil % : x    07-23    137  |  99  |  23  ----------------------------<  98  4.7   |  28  |  0.82    Ca    8.2<L>      23 Jul 2018 06:52            CAPILLARY BLOOD GLUCOSE          RADIOLOGY & ADDITIONAL TESTS:

## 2018-07-24 VITALS
HEART RATE: 68 BPM | DIASTOLIC BLOOD PRESSURE: 54 MMHG | TEMPERATURE: 98 F | RESPIRATION RATE: 16 BRPM | SYSTOLIC BLOOD PRESSURE: 111 MMHG | OXYGEN SATURATION: 95 %

## 2018-07-24 LAB
CULTURE RESULTS: NO GROWTH — SIGNIFICANT CHANGE UP
CULTURE RESULTS: NO GROWTH — SIGNIFICANT CHANGE UP
SPECIMEN SOURCE: SIGNIFICANT CHANGE UP
SPECIMEN SOURCE: SIGNIFICANT CHANGE UP

## 2018-07-24 PROCEDURE — 87070 CULTURE OTHR SPECIMN AEROBIC: CPT

## 2018-07-24 PROCEDURE — 82435 ASSAY OF BLOOD CHLORIDE: CPT

## 2018-07-24 PROCEDURE — 93970 EXTREMITY STUDY: CPT

## 2018-07-24 PROCEDURE — 84295 ASSAY OF SERUM SODIUM: CPT

## 2018-07-24 PROCEDURE — C1769: CPT

## 2018-07-24 PROCEDURE — 87186 SC STD MICRODIL/AGAR DIL: CPT

## 2018-07-24 PROCEDURE — 72193 CT PELVIS W/DYE: CPT

## 2018-07-24 PROCEDURE — 80048 BASIC METABOLIC PNL TOTAL CA: CPT

## 2018-07-24 PROCEDURE — 86140 C-REACTIVE PROTEIN: CPT

## 2018-07-24 PROCEDURE — 86850 RBC ANTIBODY SCREEN: CPT

## 2018-07-24 PROCEDURE — 97110 THERAPEUTIC EXERCISES: CPT

## 2018-07-24 PROCEDURE — 97161 PT EVAL LOW COMPLEX 20 MIN: CPT

## 2018-07-24 PROCEDURE — 97116 GAIT TRAINING THERAPY: CPT

## 2018-07-24 PROCEDURE — 85652 RBC SED RATE AUTOMATED: CPT

## 2018-07-24 PROCEDURE — 82947 ASSAY GLUCOSE BLOOD QUANT: CPT

## 2018-07-24 PROCEDURE — C1713: CPT

## 2018-07-24 PROCEDURE — 97165 OT EVAL LOW COMPLEX 30 MIN: CPT

## 2018-07-24 PROCEDURE — 76377 3D RENDER W/INTRP POSTPROCES: CPT

## 2018-07-24 PROCEDURE — 87086 URINE CULTURE/COLONY COUNT: CPT

## 2018-07-24 PROCEDURE — 73552 X-RAY EXAM OF FEMUR 2/>: CPT

## 2018-07-24 PROCEDURE — 76000 FLUOROSCOPY <1 HR PHYS/QHP: CPT

## 2018-07-24 PROCEDURE — 99285 EMERGENCY DEPT VISIT HI MDM: CPT | Mod: 25

## 2018-07-24 PROCEDURE — 85730 THROMBOPLASTIN TIME PARTIAL: CPT

## 2018-07-24 PROCEDURE — 94640 AIRWAY INHALATION TREATMENT: CPT

## 2018-07-24 PROCEDURE — P9016: CPT

## 2018-07-24 PROCEDURE — 96374 THER/PROPH/DIAG INJ IV PUSH: CPT | Mod: XU

## 2018-07-24 PROCEDURE — 80053 COMPREHEN METABOLIC PANEL: CPT

## 2018-07-24 PROCEDURE — 87040 BLOOD CULTURE FOR BACTERIA: CPT

## 2018-07-24 PROCEDURE — 86900 BLOOD TYPING SEROLOGIC ABO: CPT

## 2018-07-24 PROCEDURE — 96376 TX/PRO/DX INJ SAME DRUG ADON: CPT

## 2018-07-24 PROCEDURE — 36430 TRANSFUSION BLD/BLD COMPNT: CPT

## 2018-07-24 PROCEDURE — 86923 COMPATIBILITY TEST ELECTRIC: CPT

## 2018-07-24 PROCEDURE — 86901 BLOOD TYPING SEROLOGIC RH(D): CPT

## 2018-07-24 PROCEDURE — 71045 X-RAY EXAM CHEST 1 VIEW: CPT

## 2018-07-24 PROCEDURE — 82330 ASSAY OF CALCIUM: CPT

## 2018-07-24 PROCEDURE — 93005 ELECTROCARDIOGRAM TRACING: CPT

## 2018-07-24 PROCEDURE — 82803 BLOOD GASES ANY COMBINATION: CPT

## 2018-07-24 PROCEDURE — 85014 HEMATOCRIT: CPT

## 2018-07-24 PROCEDURE — 73502 X-RAY EXAM HIP UNI 2-3 VIEWS: CPT

## 2018-07-24 PROCEDURE — 83605 ASSAY OF LACTIC ACID: CPT

## 2018-07-24 PROCEDURE — 85027 COMPLETE CBC AUTOMATED: CPT

## 2018-07-24 PROCEDURE — 81001 URINALYSIS AUTO W/SCOPE: CPT

## 2018-07-24 PROCEDURE — C1889: CPT

## 2018-07-24 PROCEDURE — 85610 PROTHROMBIN TIME: CPT

## 2018-07-24 PROCEDURE — 97530 THERAPEUTIC ACTIVITIES: CPT

## 2018-07-24 PROCEDURE — 84132 ASSAY OF SERUM POTASSIUM: CPT

## 2018-07-24 RX ORDER — OXYCODONE HYDROCHLORIDE 5 MG/1
1 TABLET ORAL
Qty: 0 | Refills: 0 | COMMUNITY
Start: 2018-07-24

## 2018-07-24 RX ADMIN — Medication 1 MILLIGRAM(S): at 11:05

## 2018-07-24 RX ADMIN — Medication 90 MILLIGRAM(S): at 06:23

## 2018-07-24 RX ADMIN — HYDROMORPHONE HYDROCHLORIDE 8 MILLIGRAM(S): 2 INJECTION INTRAMUSCULAR; INTRAVENOUS; SUBCUTANEOUS at 13:55

## 2018-07-24 RX ADMIN — Medication 500 MILLIGRAM(S): at 06:23

## 2018-07-24 RX ADMIN — Medication 1000 MILLIGRAM(S): at 11:45

## 2018-07-24 RX ADMIN — Medication 1 PATCH: at 11:05

## 2018-07-24 RX ADMIN — Medication 3 MILLILITER(S): at 11:06

## 2018-07-24 RX ADMIN — HYDROMORPHONE HYDROCHLORIDE 8 MILLIGRAM(S): 2 INJECTION INTRAMUSCULAR; INTRAVENOUS; SUBCUTANEOUS at 10:08

## 2018-07-24 RX ADMIN — ENOXAPARIN SODIUM 40 MILLIGRAM(S): 100 INJECTION SUBCUTANEOUS at 06:24

## 2018-07-24 RX ADMIN — GABAPENTIN 300 MILLIGRAM(S): 400 CAPSULE ORAL at 13:54

## 2018-07-24 RX ADMIN — CARVEDILOL PHOSPHATE 12.5 MILLIGRAM(S): 80 CAPSULE, EXTENDED RELEASE ORAL at 06:23

## 2018-07-24 RX ADMIN — HYDROMORPHONE HYDROCHLORIDE 1 MILLIGRAM(S): 2 INJECTION INTRAMUSCULAR; INTRAVENOUS; SUBCUTANEOUS at 00:30

## 2018-07-24 RX ADMIN — Medication 1 TABLET(S): at 11:05

## 2018-07-24 RX ADMIN — Medication 1 PATCH: at 13:19

## 2018-07-24 RX ADMIN — Medication 50 MILLIGRAM(S): at 06:23

## 2018-07-24 RX ADMIN — Medication 100 MILLIGRAM(S): at 11:05

## 2018-07-24 RX ADMIN — Medication 400 MILLIGRAM(S): at 11:30

## 2018-07-24 RX ADMIN — PANTOPRAZOLE SODIUM 40 MILLIGRAM(S): 20 TABLET, DELAYED RELEASE ORAL at 06:23

## 2018-07-24 RX ADMIN — Medication 0.5 MILLIGRAM(S): at 10:42

## 2018-07-24 RX ADMIN — GABAPENTIN 300 MILLIGRAM(S): 400 CAPSULE ORAL at 06:23

## 2018-07-24 RX ADMIN — OXYCODONE HYDROCHLORIDE 20 MILLIGRAM(S): 5 TABLET ORAL at 06:23

## 2018-07-24 RX ADMIN — HYDROMORPHONE HYDROCHLORIDE 8 MILLIGRAM(S): 2 INJECTION INTRAMUSCULAR; INTRAVENOUS; SUBCUTANEOUS at 09:38

## 2018-07-24 NOTE — PROGRESS NOTE ADULT - PROBLEM SELECTOR PLAN 2
continue nebs as needed  supplemental O2 as needed
continue neds as needed  supplemental O2 as needed
Stable with no episodes fo angina
continue nebs as needed  supplemental O2 as needed

## 2018-07-24 NOTE — PROGRESS NOTE ADULT - NSHPATTENDINGPLANDISCUSS_GEN_ALL_CORE
the patient, her daughter and the orthopedic residents
the patient and the orthopedic residents
patient and staff
the patient and the orthopedic residents

## 2018-07-24 NOTE — PROGRESS NOTE ADULT - PROBLEM SELECTOR PROBLEM 4
Hypertension
Emphysema, unspecified

## 2018-07-24 NOTE — PROGRESS NOTE ADULT - ASSESSMENT
The patient underwent initial right hip fracture surgery at [facility] with a hip pin placement in 2015.  She developed avascular necrosis, necessitating a right total hip replacement at [facility] in 2017.  She was well for one year and had an accidental fall taking out her garbage June, 2018. She was now diagnosed with a periprosthetic fracture. Repair at this time consisted of lengthening the right total hip replacement and then stabilization with the distal femur.  At rehabilitation, she had a nontraumatic separation of this distal stabilization and will require a periprosthetic revision. Pain control and anxiety medications are to be provided at this time. Patients/p revision of right hip surgery. Now with suggested belleding into the right leg.
The patient underwent initial right hip fracture surgery at [facility] with a hip pin placement in 2015.  She developed avascular necrosis, necessitating a right total hip replacement at [facility] in 2017.  She was well for one year and had an accidental fall taking out her garbage June, 2018. She was now diagnosed with a periprosthetic fracture. Repair at this time consisted of lengthening the right total hip replacement and then stabilization with the distal femur.  At rehabilitation, she had a nontraumatic separation of this distal stabilization and will require a periprosthetic revision. Pain control and anxiety medications are to be provided at this time. Patients/p revision of right hip surgery. Now with suggested belleding into the right leg.
72F sp ORIF of B1 Periprosthetic fracture    ·	Neuro: Pain control  ·	Resp: IS  ·	GI: Regular diet, bowel reg  ·	MSK: TTWB RLE; PT/OT  ·	Heme: DVT PPX  ·	ID: FU OR and blood cultures  ·	DC cipro: UA contaminant   ·	DVT PPx with lovenox    Ortho 1338
72F with Right periprosthetic hip fracture    1. Pain control  2. NWB RLE  3. NPO  4. IVF  5. FU labs  6. Hold anticoagulation  7. OR today
A/p: 72yFemale s/p ORIF R femur periprosthetic fracture. VSS. NAD.
A/p: 72yFemale s/p ORIF R hip periprosthetic fracture.  Anemia postop from acute blood loss from surgery and fracture.  s/p blood yesterday.  VSS. NAD.
The patient underwent initial right hip fracture surgery at [facility] with a hip pin placement in 2015.  She developed avascular necrosis, necessitating a right total hip replacement at [facility] in 2017.  She was well for one year and had an accidental fall taking out her garbage June, 2018. She was now diagnosed with a periprosthetic fracture. Repair at this time consisted of lengthening the right total hip replacement and then stabilization with the distal femur.  At rehabilitation, she had a nontraumatic separation of this distal stabilization and will require a periprosthetic revision. Pain control and anxiety medications are to be provided at this time. Patients/p revision of right hip surgery. Now with suggested belleding into the right leg.
The patient underwent initial right hip fracture surgery at [facility] with a hip pin placement in 2015.  She developed avascular necrosis, necessitating a right total hip replacement at [facility] in 2017.  She was well for one year and had an accidental fall taking out her garbage June, 2018. She was now diagnosed with a periprosthetic fracture. Repair at this time consisted of lengthening the right total hip replacement and then stabilization with the distal femur.  At rehabilitation, she had a nontraumatic separation of this distal stabilization and will require a periprosthetic revision. Pain control and anxiety medications are to be provided at this time. Patient scheduled for revision tomorrow
The patient underwent initial right hip fracture surgery at [facility] with a hip pin placement in 2015.  She developed avascular necrosis, necessitating a right total hip replacement at [facility] in 2017.  She was well for one year and had an accidental fall taking out her garbage June, 2018. She was now diagnosed with a periprosthetic fracture. Repair at this time consisted of lengthening the right total hip replacement and then stabilization with the distal femur.  At rehabilitation, she had a nontraumatic separation of this distal stabilization and will require a periprosthetic revision. Pain control and anxiety medications are to be provided at this time. Patient scheduled for revision tomorrow
Patient is a 72y old  Female who presents with a chief complaint of Right hip pain. [facility]. Pt states she has been unable to ambulate for the past few days. Denies any new falls/trauma. Pt had revision R USAMA on 6/30/18 by Dr. Be for a periprosthetic R femur fracture. Pt was having continued pain which significantly worsened a few days ago. Denies fever/chills. Denies numbness/tingling. No other complaints at this time. X-rays show a nonunion  requiring a revision of the periprosthetic fracture repair.  Patient went to OR 07/19.  In addition to moderate pain, the patient has severe anxiety. Patient seen today post op  resting with continue complaint of leg pain
The patient underwent initial right hip fracture surgery at [facility] with a hip pin placement in 2015.  She developed avascular necrosis, necessitating a right total hip replacement at [facility] in 2017.  She was well for one year and had an accidental fall taking out her garbage June, 2018. She was now diagnosed with a periprosthetic fracture. Repair at this time consisted of lengthening the right total hip replacement and then stabilization with the distal femur.  At rehabilitation, she had a nontraumatic separation of this distal stabilization and will require a periprosthetic revision. Pain control and anxiety medications are to be provided at this time.
The patient underwent initial right hip fracture surgery at [facility] with a hip pin placement in 2015.  She developed avascular necrosis, necessitating a right total hip replacement at [facility] in 2017.  She was well for one year and had an accidental fall taking out her garbage June, 2018. She was now diagnosed with a periprosthetic fracture. Repair at this time consisted of lengthening the right total hip replacement and then stabilization with the distal femur.  At rehabilitation, she had a nontraumatic separation of this distal stabilization and will require a periprosthetic revision. Pain control and anxiety medications are to be provided at this time. Patients/p revision of right hip surgery. Now with suggested bleeding into the right leg..

## 2018-07-24 NOTE — PROGRESS NOTE ADULT - PROBLEM SELECTOR PROBLEM 5
Anemia due to blood loss
Anxiety

## 2018-07-24 NOTE — PROGRESS NOTE ADULT - PROBLEM SELECTOR PROBLEM 3
CAD (coronary artery disease)
Hypertension

## 2018-07-24 NOTE — PROGRESS NOTE ADULT - PROBLEM SELECTOR PROBLEM 1
Hip fracture
Other closed fracture of right femur, unspecified portion of femur, initial encounter
Hip fracture

## 2018-07-24 NOTE — PROGRESS NOTE ADULT - PROBLEM SELECTOR PROBLEM 2
Emphysema, unspecified
CAD (coronary artery disease)
Emphysema, unspecified

## 2018-07-24 NOTE — PROGRESS NOTE ADULT - SUBJECTIVE AND OBJECTIVE BOX
Pt seen/examined. Doing well. Pain controlled. No acute overnight complaints or events.    ICU Vital Signs Last 24 Hrs  T(C): 36.9 (24 Jul 2018 04:56), Max: 36.9 (23 Jul 2018 08:37)  T(F): 98.4 (24 Jul 2018 04:56), Max: 98.4 (23 Jul 2018 08:37)  HR: 70 (24 Jul 2018 04:56) (69 - 74)  BP: 126/76 (24 Jul 2018 04:56) (83/43 - 126/76)  BP(mean): --  ABP: --  ABP(mean): --  RR: 16 (24 Jul 2018 04:56) (16 - 16)  SpO2: 93% (24 Jul 2018 04:56) (93% - 96%)                          9.2    7.72  )-----------( 281      ( 23 Jul 2018 19:02 )             29.0     07-23    137  |  99  |  23  ----------------------------<  98  4.7   |  28  |  0.82    Ca    8.2<L>      23 Jul 2018 06:52      - Gen: NAD  - RLE: Aquacel C/D/I; skin firm and indurated, but without erythema or warmth; SILT TN/SPN/DPN/SN; TA/EHL/gs intact    72yFemale s/p R femur PP fx ORIF    - Pain control  - DVT ppx  - OOB/PT  - DC planning

## 2018-07-24 NOTE — PROGRESS NOTE ADULT - PROBLEM SELECTOR PLAN 3
currently stable follow for arrhythmias
currently stable
currently stable follow for arrhythmias
currently stable follow for arrhythmias
episode of low bp treated with fluid bolus now BP stable

## 2018-07-24 NOTE — PROGRESS NOTE ADULT - ATTENDING COMMENTS
Pt seen and examined.  Exam and plan as above
The patient underwent initial right hip fracture surgery at [facility] with a hip pin placement in 2015.  She developed avascular necrosis, necessitating a right total hip replacement at [facility] in 2017.  She was well for one year and had an accidental fall taking out her garbage June, 2018. She was now diagnosed with a periprosthetic fracture. Repair at this time consisted of lengthening the right total hip replacement and then stabilization with the distal femur.  At rehabilitation, she had a nontraumatic separation of this distal stabilization and will require a periprosthetic revision. Pain control and anxiety medications are to be provided at this time.
Pt seen and examined.  Exam and plan as above
Pt seen and examined.  Exam and plan as above
The patient underwent initial right hip fracture surgery at [facility] with a hip pin placement in 2015.  She developed avascular necrosis, necessitating a right total hip replacement at [facility] in 2017.  She was well for one year and had an accidental fall taking out her garbage June, 2018. She was now diagnosed with a periprosthetic fracture. Repair at this time consisted of lengthening the right total hip replacement and then stabilization with the distal femur.  At rehabilitation, she had a nontraumatic separation of this distal stabilization and will require a periprosthetic revision. Pain control and anxiety medications are to be provided at this time.  72yFemale s/p R femur PP fx ORIF    - Pain control  - DVT ppx  - OOB/PT  - DC planning  Stable for d/c to rehab
The patient underwent initial right hip fracture surgery at [facility] with a hip pin placement in 2015.  She developed avascular necrosis, necessitating a right total hip replacement at [facility] in 2017.  She was well for one year and had an accidental fall taking out her garbage June, 2018. She was now diagnosed with a periprosthetic fracture. Repair at this time consisted of lengthening the right total hip replacement and then stabilization with the distal femur.  At rehabilitation, she had a nontraumatic separation of this distal stabilization and will require a periprosthetic revision. Pain control and anxiety medications are to be provided at this time.
The patient underwent initial right hip fracture surgery at [facility] with a hip pin placement in 2015.  She developed avascular necrosis, necessitating a right total hip replacement at [facility] in 2017.  She was well for one year and had an accidental fall taking out her garbage June, 2018. She was now diagnosed with a periprosthetic fracture. Repair at this time consisted of lengthening the right total hip replacement and then stabilization with the distal femur.  At rehabilitation, she had a nontraumatic separation of this distal stabilization and will require a periprosthetic revision. Pain control and anxiety medications are to be provided at this time.
The patient underwent initial right hip fracture surgery at [facility] with a hip pin placement in 2015.  She developed avascular necrosis, necessitating a right total hip replacement at [facility] in 2017.  She was well for one year and had an accidental fall taking out her garbage June, 2018. She was now diagnosed with a periprosthetic fracture. Repair at this time consisted of lengthening the right total hip replacement and then stabilization with the distal femur.  At rehabilitation, she had a nontraumatic separation of this distal stabilization and will require a periprosthetic revision. Pain control and anxiety medications are to be provided at this time.
The patient underwent initial right hip fracture surgery at [facility] with a hip pin placement in 2015.  She developed avascular necrosis, necessitating a right total hip replacement at [facility] in 2017.  She was well for one year and had an accidental fall taking out her garbage June, 2018. She was now diagnosed with a periprosthetic fracture. Repair at this time consisted of lengthening the right total hip replacement and then stabilization with the distal femur.  At rehabilitation, she had a nontraumatic separation of this distal stabilization and will require a periprosthetic revision. Pain control and anxiety medications are to be provided at this time.
The patient underwent initial right hip fracture surgery at [facility] with a hip pin placement in 2015.  She developed avascular necrosis, necessitating a right total hip replacement at [facility] in 2017.  She was well for one year and had an accidental fall taking out her garbage June, 2018. She was now diagnosed with a periprosthetic fracture. Repair at this time consisted of lengthening the right total hip replacement and then stabilization with the distal femur.  At rehabilitation, she had a nontraumatic separation of this distal stabilization and will require a periprosthetic revision. Pain control and anxiety medications are to be provided at this time.

## 2018-07-24 NOTE — PROGRESS NOTE ADULT - PROBLEM SELECTOR PLAN 1
tolerated procedure  continue ICE to right hip  bedrest for now  pain meds as needed follow for oversedation
tolerated procedure  continue ICE to right hip  bedrest for now  wound avoid narcotics if possible and use NSAIDS for pain
PT/OT-TTWB  Ck crit today  IS  DVT PPx  Pain Control  Continue Current Tx.  Aggressive icing    Justino Lara PA-C  Team Pager: #[phone]
PT/OT-TTWB  Dopplers  Aggressive icing  compartment checks  lasix 20 PO x1, ofirmev for breakthrough pain  IS  DVT PPx  Pain Control  Continue Current Tx.    Justino Lara PA-C  Team Pager: #[phone]
awaiting OR  No contraindication to scheduled procedure  Alston meds as needed  DVT and GI prophylaxis
tolerated procedure  continue ICE to right hip  pain meds as needed  weight bearing as per ortho
tolerated procedure  pain meds as needed follow for oversedation  DVT and GI prophylaxis
Increase pain control  Incentive spirometry  DVT prophylaxis
tolerated procedure  continue ICE to right hip  pain meds as needed  weight bearing as per ortho

## 2018-07-24 NOTE — PROGRESS NOTE ADULT - SUBJECTIVE AND OBJECTIVE BOX
Patient is a 72y old  Female who presents with a chief complaint of Right hip pain. [facility]. Pt states she has been unable to ambulate for the past few days. Denies any new falls/trauma. Pt had revision R USAMA on 6/30/18 by Dr. Be for a periprosthetic R femur fracture. Pt was having continued pain which significantly worsened a few days ago. Denies fever/chills. Denies numbness/tingling. No other complaints at this time. X-rays show a nonunion  requiring a revision of the periprosthetic fracture repair.  The procedure is scheduled for July 20, 2018.  In addition to moderate pain, the patient has severe anxiety. Patient seen today continue complaint of leg pain. Patient with increased swelling of the right leg. Also found to have a drop in her H&H. Ice applied to leg and Patient is receiving a transfusion. seen today with continue complaint of continue pain at surgical site      MEDICATIONS  (STANDING):  ALBUTerol/ipratropium for Nebulization 3 milliLiter(s) Nebulizer every 6 hours  ascorbic acid 500 milliGRAM(s) Oral two times a day  carvedilol 12.5 milliGRAM(s) Oral every 12 hours  enoxaparin Injectable 40 milliGRAM(s) SubCutaneous every 24 hours  ferrous    sulfate 325 milliGRAM(s) Oral three times a day with meals  folic acid 1 milliGRAM(s) Oral daily  gabapentin 300 milliGRAM(s) Oral three times a day  hydrALAZINE 50 milliGRAM(s) Oral every 8 hours  lactated ringers. 1000 milliLiter(s) (75 mL/Hr) IV Continuous <Continuous>  multivitamin 1 Tablet(s) Oral daily  nicotine - 21 mG/24Hr(s) Patch 1 patch Transdermal daily  NIFEdipine XL 90 milliGRAM(s) Oral daily  oxyCODONE  ER Tablet 20 milliGRAM(s) Oral every 12 hours  pantoprazole    Tablet 40 milliGRAM(s) Oral before breakfast  polyethylene glycol 3350 17 Gram(s) Oral daily  QUEtiapine 150 milliGRAM(s) Oral at bedtime  thiamine 100 milliGRAM(s) Oral daily    MEDICATIONS  (PRN):  acetaminophen   Tablet 650 milliGRAM(s) Oral every 6 hours PRN For Temp greater than 38 C (100.4 F)  acetaminophen   Tablet. 650 milliGRAM(s) Oral every 6 hours PRN headache  ALBUTerol    90 MICROgram(s) HFA Inhaler 1 Puff(s) Inhalation every 4 hours PRN Shortness of Breath and/or Wheezing  benzocaine 15 mG/menthol 3.6 mG Lozenge 1 Lozenge Oral every 2 hours PRN Sore Throat  bisacodyl Suppository 10 milliGRAM(s) Rectal daily PRN If no bowel movement  diphenhydrAMINE   Capsule 50 milliGRAM(s) Oral every 6 hours PRN Rash and/or Itching  HYDROmorphone   Tablet 8 milliGRAM(s) Oral every 3 hours PRN Severe Pain (7 - 10)  HYDROmorphone   Tablet 4 milliGRAM(s) Oral every 3 hours PRN Moderate Pain (4 - 6)  LORazepam     Tablet 0.5 milliGRAM(s) Oral every 4 hours PRN Anxiety  magnesium hydroxide Suspension 30 milliLiter(s) Oral daily PRN Constipation  ondansetron Injectable 4 milliGRAM(s) IV Push every 6 hours PRN Nausea and/or Vomiting  tiotropium 18 MICROgram(s) Capsule 1 Capsule(s) Inhalation daily PRN shortness of breath and/or wheezing  zaleplon 5 milliGRAM(s) Oral at bedtime PRN Insomnia            Vital Signs Last 24 Hrs  T(C): 36.8 (24 Jul 2018 13:20), Max: 36.9 (24 Jul 2018 04:56)  T(F): 98.3 (24 Jul 2018 13:20), Max: 98.4 (24 Jul 2018 04:56)  HR: 68 (24 Jul 2018 13:20) (68 - 73)  BP: 111/54 (24 Jul 2018 13:20) (99/62 - 126/76)  BP(mean): --  RR: 16 (24 Jul 2018 13:20) (16 - 16)  SpO2: 95% (24 Jul 2018 13:20) (93% - 95%)    PHYSICAL EXAM:  GENERAL: NAD, well nourished and conversant  HEAD:  Atraumatic  EYES: EOM, PERRLA, conjunctiva pink and sclera white  ENT: No tonsillar erythema, exudates, or enlargement, moist mucous membranes, good dentition, no lesions  NECK: Supple, No JVD, normal thyroid, carotids with normal upstrokes and no bruits  CHEST/LUNG: soft rales at the left base  HEART: Regular rate and rhythm, No murmurs, rubs, or gallops  ABDOMEN: Soft, nondistended, no masses, guarding, tenderness or rebound, bowel sounds present  EXTREMITIES:  2+ Peripheral Pulses, No clubbing, cyanosis, or edema. (+) right periprosthetic frature site   LYMPH: No lymphadenopathy noted  SKIN: No rashes or lesions  NERVOUS SYSTEM:  Alert & Oriented X3, normal cognitive function. Motor Strength 5/5 right upper and right lower.  5/5 left upper and left lower extremities, DTRs 2+ intact and symmetric          CBC Full  -  ( 23 Jul 2018 19:02 )  WBC Count : 7.72 K/uL  Hemoglobin : 9.2 g/dL  Hematocrit : 29.0 %  Platelet Count - Automated : 281 K/uL  Mean Cell Volume : 97.0 fl  Mean Cell Hemoglobin : 30.8 pg  Mean Cell Hemoglobin Concentration : 31.7 gm/dL  Auto Neutrophil # : x  Auto Lymphocyte # : x  Auto Monocyte # : x  Auto Eosinophil # : x  Auto Basophil # : x  Auto Neutrophil % : x  Auto Lymphocyte % : x  Auto Monocyte % : x  Auto Eosinophil % : x  Auto Basophil % : x    07-23    137  |  99  |  23  ----------------------------<  98  4.7   |  28  |  0.82    Ca    8.2<L>      23 Jul 2018 06:52      No lab 7/24

## 2018-07-24 NOTE — PROGRESS NOTE ADULT - PROBLEM SELECTOR PLAN 4
currently stable
continue to follow BP and adjust as needed
continue to follow BP and adjust as needede
currently stable
Vigorous pulmonary toilet and incentive spirometry

## 2018-07-24 NOTE — PROGRESS NOTE ADULT - PROBLEM SELECTOR PLAN 5
transfuse as needed  lasix post transfusion
consider prn Xanax

## 2018-07-24 NOTE — PROGRESS NOTE ADULT - PROVIDER SPECIALTY LIST ADULT
Internal Medicine
Orthopedics
Internal Medicine

## 2018-07-28 ENCOUNTER — FORM ENCOUNTER (OUTPATIENT)
Age: 73
End: 2018-07-28

## 2018-08-02 RX ORDER — NIFEDIPINE 90 MG/1
90 TABLET, EXTENDED RELEASE ORAL DAILY
Refills: 0 | Status: ACTIVE | COMMUNITY
Start: 2018-08-02

## 2018-08-02 RX ORDER — HYDRALAZINE HYDROCHLORIDE 50 MG/1
50 TABLET ORAL 3 TIMES DAILY
Refills: 0 | Status: ACTIVE | COMMUNITY
Start: 2018-08-02

## 2018-08-02 RX ORDER — NICOTINE 21 MG/24HR
21 PATCH, TRANSDERMAL 24 HOURS TRANSDERMAL DAILY
Refills: 0 | Status: ACTIVE | COMMUNITY
Start: 2018-08-02

## 2018-08-02 RX ORDER — QUETIAPINE 50 MG/1
50 TABLET, FILM COATED ORAL
Refills: 0 | Status: ACTIVE | COMMUNITY
Start: 2018-08-02

## 2018-08-02 RX ORDER — GABAPENTIN 300 MG/1
300 CAPSULE ORAL 3 TIMES DAILY
Refills: 0 | Status: ACTIVE | COMMUNITY
Start: 2018-08-02

## 2018-08-02 RX ORDER — PANTOPRAZOLE 40 MG/1
40 TABLET, DELAYED RELEASE ORAL DAILY
Refills: 0 | Status: ACTIVE | COMMUNITY
Start: 2018-08-02

## 2018-08-02 RX ORDER — CARVEDILOL 12.5 MG/1
12.5 TABLET, FILM COATED ORAL TWICE DAILY
Refills: 0 | Status: ACTIVE | COMMUNITY
Start: 2018-08-02

## 2018-08-07 ENCOUNTER — INBOUND DOCUMENT (OUTPATIENT)
Age: 73
End: 2018-08-07

## 2018-08-20 ENCOUNTER — INPATIENT (INPATIENT)
Facility: HOSPITAL | Age: 73
LOS: 17 days | Discharge: HOME | DRG: 498 | End: 2018-09-07
Attending: INTERNAL MEDICINE | Admitting: INTERNAL MEDICINE
Payer: MEDICARE

## 2018-08-20 VITALS
SYSTOLIC BLOOD PRESSURE: 133 MMHG | RESPIRATION RATE: 20 BRPM | DIASTOLIC BLOOD PRESSURE: 71 MMHG | OXYGEN SATURATION: 98 % | HEART RATE: 75 BPM

## 2018-08-20 DIAGNOSIS — E78.5 HYPERLIPIDEMIA, UNSPECIFIED: ICD-10-CM

## 2018-08-20 DIAGNOSIS — J44.9 CHRONIC OBSTRUCTIVE PULMONARY DISEASE, UNSPECIFIED: ICD-10-CM
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
## 2018-08-20 DIAGNOSIS — M25.551 PAIN IN RIGHT HIP: ICD-10-CM

## 2018-08-20 DIAGNOSIS — I25.10 ATHEROSCLEROTIC HEART DISEASE OF NATIVE CORONARY ARTERY WITHOUT ANGINA PECTORIS: ICD-10-CM

## 2018-08-20 DIAGNOSIS — Z98.89 OTHER SPECIFIED POSTPROCEDURAL STATES: Chronic | ICD-10-CM

## 2018-08-20 DIAGNOSIS — I10 ESSENTIAL (PRIMARY) HYPERTENSION: ICD-10-CM

## 2018-08-20 DIAGNOSIS — F41.9 ANXIETY DISORDER, UNSPECIFIED: ICD-10-CM

## 2018-08-20 DIAGNOSIS — F17.200 NICOTINE DEPENDENCE, UNSPECIFIED, UNCOMPLICATED: ICD-10-CM

## 2018-08-20 DIAGNOSIS — Z96.641 PRESENCE OF RIGHT ARTIFICIAL HIP JOINT: Chronic | ICD-10-CM

## 2018-08-20 LAB
ALBUMIN SERPL ELPH-MCNC: 4.1 G/DL — SIGNIFICANT CHANGE UP (ref 3.3–5)
ALP SERPL-CCNC: 197 U/L — HIGH (ref 40–120)
ALT FLD-CCNC: 10 U/L — SIGNIFICANT CHANGE UP (ref 10–45)
ANION GAP SERPL CALC-SCNC: 14 MMOL/L — SIGNIFICANT CHANGE UP (ref 5–17)
APPEARANCE UR: CLEAR — SIGNIFICANT CHANGE UP
APTT BLD: 31.8 SEC — SIGNIFICANT CHANGE UP (ref 27.5–37.4)
AST SERPL-CCNC: 35 U/L — SIGNIFICANT CHANGE UP (ref 10–40)
BACTERIA # UR AUTO: ABNORMAL /HPF
BASOPHILS # BLD AUTO: 0 K/UL — SIGNIFICANT CHANGE UP (ref 0–0.2)
BASOPHILS NFR BLD AUTO: 0 % — SIGNIFICANT CHANGE UP (ref 0–2)
BILIRUB SERPL-MCNC: 0.4 MG/DL — SIGNIFICANT CHANGE UP (ref 0.2–1.2)
BILIRUB UR-MCNC: NEGATIVE — SIGNIFICANT CHANGE UP
BLD GP AB SCN SERPL QL: NEGATIVE — SIGNIFICANT CHANGE UP
BUN SERPL-MCNC: 46 MG/DL — HIGH (ref 7–23)
CALCIUM SERPL-MCNC: 9.2 MG/DL — SIGNIFICANT CHANGE UP (ref 8.4–10.5)
CHLORIDE SERPL-SCNC: 102 MMOL/L — SIGNIFICANT CHANGE UP (ref 96–108)
CO2 SERPL-SCNC: 20 MMOL/L — LOW (ref 22–31)
COLOR SPEC: YELLOW — SIGNIFICANT CHANGE UP
CREAT SERPL-MCNC: 1.11 MG/DL — SIGNIFICANT CHANGE UP (ref 0.5–1.3)
DIFF PNL FLD: NEGATIVE — SIGNIFICANT CHANGE UP
EOSINOPHIL # BLD AUTO: 0.1 K/UL — SIGNIFICANT CHANGE UP (ref 0–0.5)
EOSINOPHIL NFR BLD AUTO: 0.8 % — SIGNIFICANT CHANGE UP (ref 0–6)
GLUCOSE SERPL-MCNC: 88 MG/DL — SIGNIFICANT CHANGE UP (ref 70–99)
GLUCOSE UR QL: NEGATIVE — SIGNIFICANT CHANGE UP
HCT VFR BLD CALC: 38 % — SIGNIFICANT CHANGE UP (ref 34.5–45)
HGB BLD-MCNC: 11.9 G/DL — SIGNIFICANT CHANGE UP (ref 11.5–15.5)
INR BLD: 1.13 RATIO — SIGNIFICANT CHANGE UP (ref 0.88–1.16)
KETONES UR-MCNC: NEGATIVE — SIGNIFICANT CHANGE UP
LEUKOCYTE ESTERASE UR-ACNC: NEGATIVE — SIGNIFICANT CHANGE UP
LYMPHOCYTES # BLD AUTO: 0.5 K/UL — LOW (ref 1–3.3)
LYMPHOCYTES # BLD AUTO: 7.7 % — LOW (ref 13–44)
MCHC RBC-ENTMCNC: 29.1 PG — SIGNIFICANT CHANGE UP (ref 27–34)
MCHC RBC-ENTMCNC: 31.3 GM/DL — LOW (ref 32–36)
MCV RBC AUTO: 92.8 FL — SIGNIFICANT CHANGE UP (ref 80–100)
MONOCYTES # BLD AUTO: 0.5 K/UL — SIGNIFICANT CHANGE UP (ref 0–0.9)
MONOCYTES NFR BLD AUTO: 8 % — SIGNIFICANT CHANGE UP (ref 2–14)
NEUTROPHILS # BLD AUTO: 5.2 K/UL — SIGNIFICANT CHANGE UP (ref 1.8–7.4)
NEUTROPHILS NFR BLD AUTO: 83.5 % — HIGH (ref 43–77)
NITRITE UR-MCNC: NEGATIVE — SIGNIFICANT CHANGE UP
PH UR: 6 — SIGNIFICANT CHANGE UP (ref 5–8)
PLATELET # BLD AUTO: 240 K/UL — SIGNIFICANT CHANGE UP (ref 150–400)
POTASSIUM SERPL-MCNC: 4.2 MMOL/L — SIGNIFICANT CHANGE UP (ref 3.5–5.3)
POTASSIUM SERPL-SCNC: 4.2 MMOL/L — SIGNIFICANT CHANGE UP (ref 3.5–5.3)
PROT SERPL-MCNC: 8.6 G/DL — HIGH (ref 6–8.3)
PROT UR-MCNC: SIGNIFICANT CHANGE UP
PROTHROM AB SERPL-ACNC: 12.4 SEC — SIGNIFICANT CHANGE UP (ref 9.8–12.7)
RBC # BLD: 4.09 M/UL — SIGNIFICANT CHANGE UP (ref 3.8–5.2)
RBC # FLD: 14.3 % — SIGNIFICANT CHANGE UP (ref 10.3–14.5)
RBC CASTS # UR COMP ASSIST: SIGNIFICANT CHANGE UP /HPF (ref 0–2)
RH IG SCN BLD-IMP: POSITIVE — SIGNIFICANT CHANGE UP
SODIUM SERPL-SCNC: 136 MMOL/L — SIGNIFICANT CHANGE UP (ref 135–145)
SP GR SPEC: 1.02 — SIGNIFICANT CHANGE UP (ref 1.01–1.02)
UROBILINOGEN FLD QL: NEGATIVE — SIGNIFICANT CHANGE UP
WBC # BLD: 6.2 K/UL — SIGNIFICANT CHANGE UP (ref 3.8–10.5)
WBC # FLD AUTO: 6.2 K/UL — SIGNIFICANT CHANGE UP (ref 3.8–10.5)
WBC UR QL: SIGNIFICANT CHANGE UP /HPF (ref 0–5)

## 2018-08-20 PROCEDURE — 73502 X-RAY EXAM HIP UNI 2-3 VIEWS: CPT | Mod: 26,RT

## 2018-08-20 PROCEDURE — 70450 CT HEAD/BRAIN W/O DYE: CPT | Mod: 26

## 2018-08-20 PROCEDURE — 73552 X-RAY EXAM OF FEMUR 2/>: CPT | Mod: 26,RT

## 2018-08-20 PROCEDURE — 99285 EMERGENCY DEPT VISIT HI MDM: CPT

## 2018-08-20 PROCEDURE — 72190 X-RAY EXAM OF PELVIS: CPT | Mod: 26

## 2018-08-20 RX ORDER — PANTOPRAZOLE SODIUM 20 MG/1
40 TABLET, DELAYED RELEASE ORAL
Qty: 0 | Refills: 0 | Status: DISCONTINUED | OUTPATIENT
Start: 2018-08-20 | End: 2018-09-07

## 2018-08-20 RX ORDER — GABAPENTIN 400 MG/1
300 CAPSULE ORAL THREE TIMES A DAY
Qty: 0 | Refills: 0 | Status: DISCONTINUED | OUTPATIENT
Start: 2018-08-20 | End: 2018-09-07

## 2018-08-20 RX ORDER — NICOTINE POLACRILEX 2 MG
1 GUM BUCCAL DAILY
Qty: 0 | Refills: 0 | Status: DISCONTINUED | OUTPATIENT
Start: 2018-08-20 | End: 2018-09-07

## 2018-08-20 RX ORDER — ASPIRIN/CALCIUM CARB/MAGNESIUM 324 MG
81 TABLET ORAL DAILY
Qty: 0 | Refills: 0 | Status: DISCONTINUED | OUTPATIENT
Start: 2018-08-20 | End: 2018-09-07

## 2018-08-20 RX ORDER — THIAMINE MONONITRATE (VIT B1) 100 MG
100 TABLET ORAL DAILY
Qty: 0 | Refills: 0 | Status: DISCONTINUED | OUTPATIENT
Start: 2018-08-20 | End: 2018-09-07

## 2018-08-20 RX ORDER — HYDROMORPHONE HYDROCHLORIDE 2 MG/ML
8 INJECTION INTRAMUSCULAR; INTRAVENOUS; SUBCUTANEOUS
Qty: 0 | Refills: 0 | Status: DISCONTINUED | OUTPATIENT
Start: 2018-08-20 | End: 2018-08-27

## 2018-08-20 RX ORDER — NIFEDIPINE 30 MG
90 TABLET, EXTENDED RELEASE 24 HR ORAL DAILY
Qty: 0 | Refills: 0 | Status: DISCONTINUED | OUTPATIENT
Start: 2018-08-20 | End: 2018-08-26

## 2018-08-20 RX ORDER — HYDROMORPHONE HYDROCHLORIDE 2 MG/ML
4 INJECTION INTRAMUSCULAR; INTRAVENOUS; SUBCUTANEOUS
Qty: 0 | Refills: 0 | Status: DISCONTINUED | OUTPATIENT
Start: 2018-08-20 | End: 2018-08-20

## 2018-08-20 RX ORDER — BUDESONIDE, MICRONIZED 100 %
0.5 POWDER (GRAM) MISCELLANEOUS
Qty: 0 | Refills: 0 | Status: DISCONTINUED | OUTPATIENT
Start: 2018-08-20 | End: 2018-09-07

## 2018-08-20 RX ORDER — OXYCODONE HYDROCHLORIDE 5 MG/1
20 TABLET ORAL EVERY 12 HOURS
Qty: 0 | Refills: 0 | Status: DISCONTINUED | OUTPATIENT
Start: 2018-08-20 | End: 2018-08-27

## 2018-08-20 RX ORDER — POLYETHYLENE GLYCOL 3350 17 G/17G
17 POWDER, FOR SOLUTION ORAL DAILY
Qty: 0 | Refills: 0 | Status: DISCONTINUED | OUTPATIENT
Start: 2018-08-20 | End: 2018-09-07

## 2018-08-20 RX ORDER — CARVEDILOL PHOSPHATE 80 MG/1
12.5 CAPSULE, EXTENDED RELEASE ORAL EVERY 12 HOURS
Qty: 0 | Refills: 0 | Status: DISCONTINUED | OUTPATIENT
Start: 2018-08-20 | End: 2018-08-26

## 2018-08-20 RX ORDER — DOCUSATE SODIUM 100 MG
100 CAPSULE ORAL
Qty: 0 | Refills: 0 | Status: DISCONTINUED | OUTPATIENT
Start: 2018-08-20 | End: 2018-08-29

## 2018-08-20 RX ORDER — HYDRALAZINE HCL 50 MG
50 TABLET ORAL EVERY 8 HOURS
Qty: 0 | Refills: 0 | Status: DISCONTINUED | OUTPATIENT
Start: 2018-08-20 | End: 2018-08-26

## 2018-08-20 RX ORDER — MORPHINE SULFATE 50 MG/1
4 CAPSULE, EXTENDED RELEASE ORAL ONCE
Qty: 0 | Refills: 0 | Status: DISCONTINUED | OUTPATIENT
Start: 2018-08-20 | End: 2018-08-20

## 2018-08-20 RX ORDER — SENNA PLUS 8.6 MG/1
2 TABLET ORAL AT BEDTIME
Qty: 0 | Refills: 0 | Status: DISCONTINUED | OUTPATIENT
Start: 2018-08-20 | End: 2018-09-07

## 2018-08-20 RX ORDER — FOLIC ACID 0.8 MG
1 TABLET ORAL DAILY
Qty: 0 | Refills: 0 | Status: DISCONTINUED | OUTPATIENT
Start: 2018-08-20 | End: 2018-09-07

## 2018-08-20 RX ORDER — ENOXAPARIN SODIUM 100 MG/ML
40 INJECTION SUBCUTANEOUS DAILY
Qty: 0 | Refills: 0 | Status: COMPLETED | OUTPATIENT
Start: 2018-08-20 | End: 2018-08-28

## 2018-08-20 RX ORDER — ACETAMINOPHEN 500 MG
650 TABLET ORAL EVERY 6 HOURS
Qty: 0 | Refills: 0 | Status: DISCONTINUED | OUTPATIENT
Start: 2018-08-20 | End: 2018-09-07

## 2018-08-20 RX ORDER — ACETAMINOPHEN 500 MG
975 TABLET ORAL ONCE
Qty: 0 | Refills: 0 | Status: COMPLETED | OUTPATIENT
Start: 2018-08-20 | End: 2018-08-20

## 2018-08-20 RX ORDER — TRAMADOL HYDROCHLORIDE 50 MG/1
50 TABLET ORAL EVERY 6 HOURS
Qty: 0 | Refills: 0 | Status: DISCONTINUED | OUTPATIENT
Start: 2018-08-20 | End: 2018-08-27

## 2018-08-20 RX ORDER — QUETIAPINE FUMARATE 200 MG/1
50 TABLET, FILM COATED ORAL EVERY 6 HOURS
Qty: 0 | Refills: 0 | Status: DISCONTINUED | OUTPATIENT
Start: 2018-08-20 | End: 2018-09-07

## 2018-08-20 RX ORDER — QUETIAPINE FUMARATE 200 MG/1
150 TABLET, FILM COATED ORAL AT BEDTIME
Qty: 0 | Refills: 0 | Status: DISCONTINUED | OUTPATIENT
Start: 2018-08-20 | End: 2018-09-07

## 2018-08-20 RX ADMIN — QUETIAPINE FUMARATE 150 MILLIGRAM(S): 200 TABLET, FILM COATED ORAL at 22:58

## 2018-08-20 RX ADMIN — HYDROMORPHONE HYDROCHLORIDE 8 MILLIGRAM(S): 2 INJECTION INTRAMUSCULAR; INTRAVENOUS; SUBCUTANEOUS at 22:57

## 2018-08-20 RX ADMIN — MORPHINE SULFATE 4 MILLIGRAM(S): 50 CAPSULE, EXTENDED RELEASE ORAL at 18:08

## 2018-08-20 RX ADMIN — HYDROMORPHONE HYDROCHLORIDE 8 MILLIGRAM(S): 2 INJECTION INTRAMUSCULAR; INTRAVENOUS; SUBCUTANEOUS at 23:30

## 2018-08-20 RX ADMIN — Medication 975 MILLIGRAM(S): at 13:20

## 2018-08-20 NOTE — ED ADULT NURSE REASSESSMENT NOTE - NS ED NURSE REASSESS COMMENT FT1
Pt returned from CT in NAD. Awaiting results and aware of care plan. Pt informed to stay in bed and call for help. She reports pain MD Peres aware. Will continue to monitor and medicate appropriately.

## 2018-08-20 NOTE — PHYSICAL THERAPY INITIAL EVALUATION ADULT - PERTINENT HX OF CURRENT PROBLEM, REHAB EVAL
Pt is a 71 y/o female admitted to [facility] on 8/20/18  PMH of emphysema, CAD, HTN, revision USAMA with ORIF for periprosthetic R femur fracture on 6/30/18 by Dr. Be, discharged on 7/3/18 to [facility] rehab, readmitted from 07/16 - 07/24/18 w/ worsening R hip pain and decreased ability to ambulate, now s/p right femur [place] B1 periprosthetic fracture ORIF by Dr. Cornell on 07/19/18, BIBEMS s/p mechanical fall on stairs during the night c/o R hip and R leg pain, redness, swelling, stiffness.

## 2018-08-20 NOTE — ED ADULT NURSE NOTE - OBJECTIVE STATEMENT
71 yo female comes in by EMS for fall today. Pt is s/p right hip replacement last week. Pt states she was just d/c several days ago from [facility] rehab. Pt reports that she had a trip and fall on the stairs of her home today and her daughter called an ambulance. She denies loc and use of blood thinner .Pt reports she did strike the back of her head on the stairs. She is completely neurologically intact and has rom of all extremities and pain in he right leg and right groin. EMS states pt has known h/o ETOH an heroin/ cocaine use. Pt denies use of illicit drugs today or recently. Her right leg is swollen on the upper aspect of the thigh. Surrounding surgical site leg is warm to touch, red and dry. Skin is taut on right thigh and right foot is swollen +3 pitting edema. Sadiq calves swollen, +2 non pitting edema, color and temperature appropriate. There is a healing wound to the right foot that is flaky and dry. No signs of bleeding present. Sadqi +3 pedal pulses present in sadiq feet. Pts clothing is urine-soaked on arrival and pt states she has been incontinent or urine for several weeks. Pt cleaned up and put in clean gown, diaper and red socks to prevent falls .Pt educated to NOT get out of bed and to call for help. Yellow fall risk band in place. Stretcher in lowest position with wheels locked and side rails up. Pt in room near nurses station. VSS/ NAD. Safety and comfort maintained. Will continue to monitor.

## 2018-08-20 NOTE — ED PROVIDER NOTE - SHIFT CHANGE DETAILS
Jyoti Ramirez MD - Attending Physician: Pt here after fall, recent hip fracture. PT eval and unable to ambulate. To readmit

## 2018-08-20 NOTE — ED PROVIDER NOTE - ATTENDING CONTRIBUTION TO CARE
R hip pain and head strike overnight.  skin thickening to R hip around wound site not warm or tender, is erythematous.   ct head. xr hip / femur. d/w orthopedics. R hip pain and head strike overnight.  skin thickening to R hip around wound site not warm or tender, is erythematous. also w suprapubic fullness, concern for urinary retention  ct head. xr hip / femur. d/w orthopedics. pt jo

## 2018-08-20 NOTE — PHYSICAL THERAPY INITIAL EVALUATION ADULT - PRECAUTIONS/LIMITATIONS, REHAB EVAL
From prior, note, patient had initial right hip fracture surgery at [facility] with a hip pin placement in 2015.  She developed avascular necrosis, necessitating a right total hip replacement at [facility] in 2017.  She was well for one year and had an accidental fall taking out her garbage June, 2018. She was then diagnosed with a periprosthetic fracture. Repair consisted of lengthening the right total hip replacement and then stabilization with the distal femur.  At rehabilitation, she had a nontraumatic separation of this distal stabilization which required a periprosthetic revision. Patients/p revision of right hip surgery as of 07/19/18. XR R hip: Right total hip revision arthroplasty with 2 acetabular screws and long stem femoral component with cerclage wires. Additional long lateral side plate with multiple fixation screws are visualized. The prosthesis appears intact and in place. There is new heterotopic ossification surrounding the proximal/mid shaft of the right femur. There is redemonstration of old right proximal femur fracture and midshaft femur fracture.

## 2018-08-20 NOTE — ED PROVIDER NOTE - MUSCULOSKELETAL, MLM
Spine appears normal. Pain on palpation of R hip and lateral aspect of R lower extremity above knee.

## 2018-08-20 NOTE — CONSULT NOTE ADULT - ASSESSMENT
A/P: 72y Female with R hip pain and difficulty ambulating  Pain control  WBAT RLE  FU CT RLE to further evaluate for acute fracture  Admit to medical team  No acute orthopedic surgery intervention indicated at this time  FU with Dr. Cornell as outpatient  Discussed with attending  Will FU CT RLE and advise if plan changes

## 2018-08-20 NOTE — CONSULT NOTE ADULT - SUBJECTIVE AND OBJECTIVE BOX
Fell in rehabilitation and with right leg pain. Xrays are non diagnostic and patient to have a CTT of the right leg to evaluate for occult fracture.

## 2018-08-20 NOTE — ED PROVIDER NOTE - MEDICAL DECISION MAKING DETAILS
71 yo F w/ PMH of multiple hip surgeries, most recent revision/ORIF on 07/19/18, presenting s/p mechanical fall on stairs during night in which she hit her head and R hip, c/o R hip and generalized R leg pain; also w/ R lower extremity pain, redness, stiffness, swelling. Will obtain xrays of R hip, R femur, pelvis, and CT head. Also obtain cbc, cmp, coags, type and screen. Consult ortho, treat pain, reassess.

## 2018-08-20 NOTE — ED PROVIDER NOTE - CONSTITUTIONAL, MLM
normal... Awake, alert, oriented to person, place, time/situation and appears uncomfortable, moaning in bed.

## 2018-08-20 NOTE — ED ADULT NURSE NOTE - NSIMPLEMENTINTERV_GEN_ALL_ED
Implemented All Fall with Harm Risk Interventions:  Horatio to call system. Call bell, personal items and telephone within reach. Instruct patient to call for assistance. Room bathroom lighting operational. Non-slip footwear when patient is off stretcher. Physically safe environment: no spills, clutter or unnecessary equipment. Stretcher in lowest position, wheels locked, appropriate side rails in place. Provide visual cue, wrist band, yellow gown, etc. Monitor gait and stability. Monitor for mental status changes and reorient to person, place, and time. Review medications for side effects contributing to fall risk. Reinforce activity limits and safety measures with patient and family. Provide visual clues: red socks.

## 2018-08-20 NOTE — ED ADULT NURSE REASSESSMENT NOTE - NS ED NURSE REASSESS COMMENT FT1
19:05. Report received from WILLIE Stevenson. Pt AAOx4, NAD, resp nonlabored, skin warm/dry, resting comfortably in bed. Pt awaiting bed upstairs. Safety maintained.

## 2018-08-20 NOTE — H&P ADULT - NEGATIVE NEUROLOGICAL SYMPTOMS
no weakness/no generalized seizures/no syncope/no focal seizures/no transient paralysis/no paresthesias

## 2018-08-20 NOTE — ED PROVIDER NOTE - SKIN, MLM
Skin normal color for race. Redness, swelling along lateral aspect of R LE above knee. Well healed scab also along lateral aspect.

## 2018-08-20 NOTE — CONSULT NOTE ADULT - ATTENDING COMMENTS
Medically stable for Discharge to Rehab. Multiple right leg surgeries previously performed. New fall and right leg edematous and with decreased range of motion. Will obtain CT of leg after non-diagnostic xrays. MRI also may be necessary.

## 2018-08-20 NOTE — ED CLERICAL - CLERICAL COMMENTS
This patient is enrolled in the comprehensive joint replacement (CJR) program and has active care navigation.  This patient can be followed up by the care navigation team within 24 hours. To arrange close follow-up or to obtain additional clinical information about this patient, please call the contact number above. Please call the orthopedic resident ([phone]) for ALL patients who are admitted or placed in observation.

## 2018-08-20 NOTE — CONSULT NOTE ADULT - ATTENDING COMMENTS
pt seen and examined.  exam and plan as above.  Dr. Moe (arthroplasty surgeon) reviewed films as well and agrees that there is no role for any surgical intervention at this time

## 2018-08-20 NOTE — ED PROVIDER NOTE - OBJECTIVE STATEMENT
71 yo F, w/ PMH revision USAMA with ORIF for periprosthetic R femur fracture on 6/30/18 by Dr. Be, discharged on 7/3/18 to [facility], readmitted from 07/16 - 07/24/18 w/ worsening R hip pain and decreased ability to ambulate, now s/p right femur [place] B1 periprosthetic fracture ORIF by Dr. Cornell on 07/19/18, BIBEMS s/p mechanical fall on stairs during the night c/o R hip and R leg pain, redness, swelling, stiffness. 73 yo F, w/ PMH of emphysema, CAD, HTN, revision USAMA with ORIF for periprosthetic R femur fracture on 6/30/18 by Dr. Be, discharged on 7/3/18 to [facility] rehab, readmitted from 07/16 - 07/24/18 w/ worsening R hip pain and decreased ability to ambulate, now s/p right femur [place] B1 periprosthetic fracture ORIF by Dr. Cornell on 07/19/18, BIBEMS s/p mechanical fall on stairs during the night c/o R hip and R leg pain, redness, swelling, stiffness. Patient denies other complaints. Denies headache, neck stiffness, fever/chills, vision change, chest pain, shortness of breath, difficulty breathing, palpitations, weakness, dizziness, nausea, vomiting, diarrhea, syncope.     From prior, note, patient had initial right hip fracture surgery at [facility] with a hip pin placement in 2015.  She developed avascular necrosis, necessitating a right total hip replacement at [facility] in 2017.  She was well for one year and had an accidental fall taking out her garbage June, 2018. She was then diagnosed with a periprosthetic fracture. Repair consisted of lengthening the right total hip replacement and then stabilization with the distal femur.  At rehabilitation, she had a nontraumatic separation of this distal stabilization which required a periprosthetic revision. Patients/p revision of right hip surgery as of 07/19/18.

## 2018-08-20 NOTE — ED ADULT NURSE REASSESSMENT NOTE - NS ED NURSE REASSESS COMMENT FT1
Silva catheter placed using sterile technique with two nurses at bedside. 1100 cc initial output noted. Yellow clear foul smelling urine noted. A&OX3. Safety and comfort measures provided.

## 2018-08-20 NOTE — PHYSICAL THERAPY INITIAL EVALUATION ADULT - ADDITIONAL COMMENTS
Prior to recent hospitalizations/rehab, pt lives with spouse in a private home, steps to enter (+chair lift). Pt owns a RW. Pt has a flight to bedroom, has a chair lift. Pt states she was independent with ADLs and ambulation prior to rehab. Pt's spouse is s/p CVA and is currently at rehab.

## 2018-08-20 NOTE — CONSULT NOTE ADULT - SUBJECTIVE AND OBJECTIVE BOX
72y Female presents c/o R hip/leg pain and difficulty ambulating sp mechanical fall. Pt is well known to orthopedic service. Most recently had ORIF of a R periprosthetic femur fracture on 7/19/18. Pt was subsequently discharged to rehab and presented today from home. Pt has been ambulating with a walker at home. Denies HS/LOC. Denies numbness/tingling. Denies fever/chills. Denies pain/injury elsewhere. No other orthopedic complaints at this time.     HEALTH ISSUES - PROBLEM Dx:  Smoker: Smoker  Anxiety: Anxiety  Hyperlipidemia: Hyperlipidemia  HTN (hypertension): HTN (hypertension)  CAD (coronary artery disease): CAD (coronary artery disease)  COPD (chronic obstructive pulmonary disease): COPD (chronic obstructive pulmonary disease)  Hip pain, acute, right: Hip pain, acute, right  Fall from standing, initial encounter: Fall from standing, initial encounter        MEDICATIONS  (STANDING):  aspirin enteric coated 81 milliGRAM(s) Oral daily  buDESOnide   0.5 milliGRAM(s) Respule 0.5 milliGRAM(s) Inhalation two times a day  carvedilol 12.5 milliGRAM(s) Oral every 12 hours  docusate sodium 100 milliGRAM(s) Oral two times a day  enoxaparin Injectable 40 milliGRAM(s) SubCutaneous daily  folic acid 1 milliGRAM(s) Oral daily  gabapentin 300 milliGRAM(s) Oral three times a day  hydrALAZINE 50 milliGRAM(s) Oral every 8 hours  nicotine - 21 mG/24Hr(s) Patch 1 patch Transdermal daily  NIFEdipine XL 90 milliGRAM(s) Oral daily  oxyCODONE  ER Tablet 20 milliGRAM(s) Oral every 12 hours  pantoprazole    Tablet 40 milliGRAM(s) Oral before breakfast  polyethylene glycol 3350 17 Gram(s) Oral daily  QUEtiapine 150 milliGRAM(s) Oral at bedtime  senna 2 Tablet(s) Oral at bedtime  thiamine 100 milliGRAM(s) Oral daily  traMADol 50 milliGRAM(s) Oral every 6 hours    Allergies    No Known Allergies    Intolerances                              11.9   6.2   )-----------( 240      ( 20 Aug 2018 13:22 )             38.0     20 Aug 2018 13:22    136    |  102    |  46     ----------------------------<  88     4.2     |  20     |  1.11     Ca    9.2        20 Aug 2018 13:22    TPro  8.6    /  Alb  4.1    /  TBili  0.4    /  DBili  x      /  AST  35     /  ALT  10     /  AlkPhos  197    20 Aug 2018 13:22    PT/INR - ( 20 Aug 2018 13:23 )   PT: 12.4 sec;   INR: 1.13 ratio         PTT - ( 20 Aug 2018 13:23 )  PTT:31.8 sec  Vital Signs Last 24 Hrs  T(C): 37.4 (08-20-18 @ 20:11), Max: 37.4 (08-20-18 @ 20:11)  T(F): 99.4 (08-20-18 @ 20:11), Max: 99.4 (08-20-18 @ 20:11)  HR: 73 (08-20-18 @ 20:11) (73 - 85)  BP: 110/67 (08-20-18 @ 20:11) (108/59 - 133/71)  BP(mean): --  RR: 18 (08-20-18 @ 20:11) (17 - 20)  SpO2: 93% (08-20-18 @ 20:11) (93% - 98%)    Imaging: XR demonstates R/L hip fracture    Physical Exam  Gen: NAD  RLE: skin intact, healed incision over R hip, no open wounds or areas of drainage, chronic erythematous skin changes with induration, unable to SLR 2/2 pain, negative log roll RLE, no bony ttp, +ehl/fhl/ta/gs function, SILT L3-S1, no calf ttp, +dp pulse intact, compartments soft    Secondary survey: benign, nv intact, able to SLR contralateral leg, negative log roll contralateral leg, no bony ttp elsewhere

## 2018-08-20 NOTE — CONSULT NOTE ADULT - ASSESSMENT
71 yo F, w/ PMH of emphysema, CAD, HTN, revision USAMA with ORIF for periprosthetic R femur fracture on 6/30/18 by Dr. Be, discharged on 7/3/18 to [facility] rehab, readmitted from 07/16 - 07/24/18 w/ worsening R hip pain and decreased ability to ambulate, now s/p right femur vancouver B1 periprosthetic fracture ORIF by Dr. Cornell on 07/19/18, BIBEMS s/p mechanical fall on stairs during the night c/o R hip and R leg pain, redness, swelling, stiffness. Patient denies other complaints. Denies headache, neck stiffness, fever/chills, vision change, chest pain, shortness of breath, difficulty breathing, palpitations, weakness, dizziness, nausea, vomiting, diarrhea, syncope.   From prior, note, patient had initial right hip fracture surgery at [facility] with a hip pin placement in 2015.  She developed avascular necrosis, necessitating a right total hip replacement at [facility] in 2017.  She was well for one year and had an accidental fall taking out her garbage June, 2018. She was then diagnosed with a periprosthetic fracture. Repair consisted of lengthening the right total hip replacement and then stabilization with the distal femur.  At rehabilitation, she had a nontraumatic separation of this distal stabilization which required a periprosthetic revision. Patients/p revision of right hip surgery as of 07/19/18.

## 2018-08-20 NOTE — H&P ADULT - RS GEN PE MLT RESP DETAILS PC
breath sounds equal/no chest wall tenderness/no rales/good air movement/respirations non-labored/normal/airway patent/clear to auscultation bilaterally/no rhonchi/no intercostal retractions

## 2018-08-20 NOTE — H&P ADULT - GASTROINTESTINAL DETAILS
soft/bowel sounds normal/no bruit/no rebound tenderness/no masses palpable/nontender/no distention/no guarding/normal

## 2018-08-20 NOTE — ED ADULT NURSE REASSESSMENT NOTE - NS ED NURSE REASSESS COMMENT FT1
pt is sleeping in bed at this time, she is being transported off unit right now for ct. will continue to monitor at return

## 2018-08-20 NOTE — ED PROVIDER NOTE - PROGRESS NOTE DETAILS
d/w orthopedics . this pt is well known to them. states that the leg skin changes are chronic and that they will review imaging and be in touch w us. Spoke with physical therapy. Will evaluate patient. Spoke with social work and case management. Believe patient unsafe to go home, unable to Spoke with social work and case management. Believe patient unsafe to go home, unable to be sent to nursing home or rehab from ED. Spoke with orthopedic resident taking consults. Indicated that patient would require readmission. Orthopedic resident states that patient should be admitted to medical service as no surgical intervention planned based on imaging performed today. Indicated would consult on patient but that patient should be admitted to medicine. Paged unattached hospitalist. Awaiting callback. Spoke with unattached hospitalist. Indicated would accept patient, pending official consult by orthopedic in ED. Will await formal orthopedic recommendations, and admit to medicine if no change in plan after consult. Spoke with Patient's Daughter. Aware of admission, negative Xray here, but still persistent diff walking. She notes patient with history of Benzo abuse in past, prior seizure related to overdose/withdrawal (unclear). Pt psychiatrist restarted her on Benzos while in Rehab. Since dc, plan was for weaning to off per daughter's request. Was dc to home with 13 tablets with taper plan, daughter unsure if patient has been appropriately taking or not.

## 2018-08-20 NOTE — H&P ADULT - ASSESSMENT
71 yo F, w/ PMH of emphysema, CAD, HTN, revision USAMA with ORIF for periprosthetic R femur fracture on 6/30/18 by Dr. Be, discharged on 7/3/18 to [facility] rehab, readmitted from 07/16 - 07/24/18 w/ worsening R hip pain and decreased ability to ambulate, now s/p right femur vancouver B1 periprosthetic fracture ORIF by Dr. Cornell on 07/19/18, BIBEMS s/p mechanical fall on stairs during the night c/o R hip and R leg pain, redness, swelling, stiffness. Patient denies other complaints. Denies headache, neck stiffness, fever/chills, vision change, chest pain, shortness of breath, difficulty breathing, palpitations, weakness, dizziness, nausea, vomiting, diarrhea, syncope.   From prior, note, patient had initial right hip fracture surgery at [facility] with a hip pin placement in 2015.  She developed avascular necrosis, necessitating a right total hip replacement at [facility] in 2017.  She was well for one year and had an accidental fall taking out her garbage June, 2018. She was then diagnosed with a periprosthetic fracture. Repair consisted of lengthening the right total hip replacement and then stabilization with the distal femur.  At rehabilitation, she had a nontraumatic separation of this distal stabilization which required a periprosthetic revision. Patients/p revision of right hip surgery as of 07/19/18.

## 2018-08-21 LAB
ANION GAP SERPL CALC-SCNC: 13 MMOL/L — SIGNIFICANT CHANGE UP (ref 5–17)
BUN SERPL-MCNC: 32 MG/DL — HIGH (ref 7–23)
CALCIUM SERPL-MCNC: 8.7 MG/DL — SIGNIFICANT CHANGE UP (ref 8.4–10.5)
CHLORIDE SERPL-SCNC: 103 MMOL/L — SIGNIFICANT CHANGE UP (ref 96–108)
CO2 SERPL-SCNC: 22 MMOL/L — SIGNIFICANT CHANGE UP (ref 22–31)
CREAT SERPL-MCNC: 0.89 MG/DL — SIGNIFICANT CHANGE UP (ref 0.5–1.3)
CULTURE RESULTS: NO GROWTH — SIGNIFICANT CHANGE UP
GLUCOSE SERPL-MCNC: 83 MG/DL — SIGNIFICANT CHANGE UP (ref 70–99)
HCT VFR BLD CALC: 32.9 % — LOW (ref 34.5–45)
HGB BLD-MCNC: 10.5 G/DL — LOW (ref 11.5–15.5)
MCHC RBC-ENTMCNC: 29.7 PG — SIGNIFICANT CHANGE UP (ref 27–34)
MCHC RBC-ENTMCNC: 31.9 GM/DL — LOW (ref 32–36)
MCV RBC AUTO: 93.2 FL — SIGNIFICANT CHANGE UP (ref 80–100)
PLATELET # BLD AUTO: 224 K/UL — SIGNIFICANT CHANGE UP (ref 150–400)
POTASSIUM SERPL-MCNC: 4 MMOL/L — SIGNIFICANT CHANGE UP (ref 3.5–5.3)
POTASSIUM SERPL-SCNC: 4 MMOL/L — SIGNIFICANT CHANGE UP (ref 3.5–5.3)
RBC # BLD: 3.53 M/UL — LOW (ref 3.8–5.2)
RBC # FLD: 15.5 % — HIGH (ref 10.3–14.5)
SODIUM SERPL-SCNC: 138 MMOL/L — SIGNIFICANT CHANGE UP (ref 135–145)
SPECIMEN SOURCE: SIGNIFICANT CHANGE UP
WBC # BLD: 6.94 K/UL — SIGNIFICANT CHANGE UP (ref 3.8–10.5)
WBC # FLD AUTO: 6.94 K/UL — SIGNIFICANT CHANGE UP (ref 3.8–10.5)

## 2018-08-21 PROCEDURE — 76377 3D RENDER W/INTRP POSTPROCES: CPT | Mod: 26

## 2018-08-21 PROCEDURE — 73700 CT LOWER EXTREMITY W/O DYE: CPT | Mod: 26,RT

## 2018-08-21 PROCEDURE — 93971 EXTREMITY STUDY: CPT | Mod: 26

## 2018-08-21 RX ADMIN — ENOXAPARIN SODIUM 40 MILLIGRAM(S): 100 INJECTION SUBCUTANEOUS at 13:36

## 2018-08-21 RX ADMIN — GABAPENTIN 300 MILLIGRAM(S): 400 CAPSULE ORAL at 13:36

## 2018-08-21 RX ADMIN — Medication 50 MILLIGRAM(S): at 22:17

## 2018-08-21 RX ADMIN — OXYCODONE HYDROCHLORIDE 20 MILLIGRAM(S): 5 TABLET ORAL at 06:30

## 2018-08-21 RX ADMIN — Medication 100 MILLIGRAM(S): at 13:36

## 2018-08-21 RX ADMIN — TRAMADOL HYDROCHLORIDE 50 MILLIGRAM(S): 50 TABLET ORAL at 14:03

## 2018-08-21 RX ADMIN — SENNA PLUS 2 TABLET(S): 8.6 TABLET ORAL at 22:17

## 2018-08-21 RX ADMIN — Medication 1 MILLIGRAM(S): at 13:36

## 2018-08-21 RX ADMIN — Medication 81 MILLIGRAM(S): at 13:36

## 2018-08-21 RX ADMIN — QUETIAPINE FUMARATE 50 MILLIGRAM(S): 200 TABLET, FILM COATED ORAL at 00:36

## 2018-08-21 RX ADMIN — TRAMADOL HYDROCHLORIDE 50 MILLIGRAM(S): 50 TABLET ORAL at 19:00

## 2018-08-21 RX ADMIN — OXYCODONE HYDROCHLORIDE 20 MILLIGRAM(S): 5 TABLET ORAL at 18:24

## 2018-08-21 RX ADMIN — QUETIAPINE FUMARATE 150 MILLIGRAM(S): 200 TABLET, FILM COATED ORAL at 22:16

## 2018-08-21 RX ADMIN — HYDROMORPHONE HYDROCHLORIDE 8 MILLIGRAM(S): 2 INJECTION INTRAMUSCULAR; INTRAVENOUS; SUBCUTANEOUS at 02:40

## 2018-08-21 RX ADMIN — HYDROMORPHONE HYDROCHLORIDE 8 MILLIGRAM(S): 2 INJECTION INTRAMUSCULAR; INTRAVENOUS; SUBCUTANEOUS at 10:18

## 2018-08-21 RX ADMIN — GABAPENTIN 300 MILLIGRAM(S): 400 CAPSULE ORAL at 00:36

## 2018-08-21 RX ADMIN — HYDROMORPHONE HYDROCHLORIDE 8 MILLIGRAM(S): 2 INJECTION INTRAMUSCULAR; INTRAVENOUS; SUBCUTANEOUS at 02:10

## 2018-08-21 RX ADMIN — OXYCODONE HYDROCHLORIDE 20 MILLIGRAM(S): 5 TABLET ORAL at 05:57

## 2018-08-21 RX ADMIN — HYDROMORPHONE HYDROCHLORIDE 8 MILLIGRAM(S): 2 INJECTION INTRAMUSCULAR; INTRAVENOUS; SUBCUTANEOUS at 22:18

## 2018-08-21 RX ADMIN — Medication 1 PATCH: at 13:35

## 2018-08-21 RX ADMIN — TRAMADOL HYDROCHLORIDE 50 MILLIGRAM(S): 50 TABLET ORAL at 18:24

## 2018-08-21 RX ADMIN — HYDROMORPHONE HYDROCHLORIDE 8 MILLIGRAM(S): 2 INJECTION INTRAMUSCULAR; INTRAVENOUS; SUBCUTANEOUS at 10:48

## 2018-08-21 RX ADMIN — QUETIAPINE FUMARATE 50 MILLIGRAM(S): 200 TABLET, FILM COATED ORAL at 13:43

## 2018-08-21 RX ADMIN — CARVEDILOL PHOSPHATE 12.5 MILLIGRAM(S): 80 CAPSULE, EXTENDED RELEASE ORAL at 18:30

## 2018-08-21 RX ADMIN — HYDROMORPHONE HYDROCHLORIDE 8 MILLIGRAM(S): 2 INJECTION INTRAMUSCULAR; INTRAVENOUS; SUBCUTANEOUS at 22:48

## 2018-08-21 RX ADMIN — Medication 100 MILLIGRAM(S): at 18:27

## 2018-08-21 RX ADMIN — OXYCODONE HYDROCHLORIDE 20 MILLIGRAM(S): 5 TABLET ORAL at 18:30

## 2018-08-21 RX ADMIN — CARVEDILOL PHOSPHATE 12.5 MILLIGRAM(S): 80 CAPSULE, EXTENDED RELEASE ORAL at 05:57

## 2018-08-21 RX ADMIN — GABAPENTIN 300 MILLIGRAM(S): 400 CAPSULE ORAL at 22:17

## 2018-08-21 RX ADMIN — TRAMADOL HYDROCHLORIDE 50 MILLIGRAM(S): 50 TABLET ORAL at 14:33

## 2018-08-21 NOTE — CONSULT NOTE ADULT - SUBJECTIVE AND OBJECTIVE BOX
Transferred from rehabilitation after falling with right hip pain. CTT with fluid accumulation, but no fractures. Seen by orthopedics and no intervention necessary at this time.

## 2018-08-21 NOTE — PROGRESS NOTE ADULT - ASSESSMENT
73 yo F, w/ PMH of emphysema, CAD, HTN, revision USAMA with ORIF for periprosthetic R femur fracture on 6/30/18 by Dr. Be, discharged on 7/3/18 to [facility] rehab, readmitted from 07/16 - 07/24/18 w/ worsening R hip pain and decreased ability to ambulate, now s/p right femur vancouver B1 periprosthetic fracture ORIF by Dr. Cornell on 07/19/18, BIBEMS s/p mechanical fall on stairs during the night c/o R hip and R leg pain, redness, swelling, stiffness. Patient denies other complaints. Denies headache, neck stiffness, fever/chills, vision change, chest pain, shortness of breath, difficulty breathing, palpitations, weakness, dizziness, nausea, vomiting, diarrhea, syncope.   From prior, note, patient had initial right hip fracture surgery at [facility] with a hip pin placement in 2015.  She developed avascular necrosis, necessitating a right total hip replacement at [facility] in 2017.  She was well for one year and had an accidental fall taking out her garbage June, 2018. She was then diagnosed with a periprosthetic fracture. Repair consisted of lengthening the right total hip replacement and then stabilization with the distal femur.  At rehabilitation, she had a nontraumatic separation of this distal stabilization which required a periprosthetic revision. Patients/p revision of right hip surgery as of 07/19/18.

## 2018-08-22 DIAGNOSIS — R50.9 FEVER, UNSPECIFIED: ICD-10-CM

## 2018-08-22 LAB
APPEARANCE UR: ABNORMAL
BILIRUB UR-MCNC: NEGATIVE — SIGNIFICANT CHANGE UP
COLOR SPEC: YELLOW — SIGNIFICANT CHANGE UP
DIFF PNL FLD: ABNORMAL
GLUCOSE UR QL: NEGATIVE MG/DL — SIGNIFICANT CHANGE UP
HCT VFR BLD CALC: 34 % — LOW (ref 34.5–45)
HGB BLD-MCNC: 11.3 G/DL — LOW (ref 11.5–15.5)
KETONES UR-MCNC: NEGATIVE — SIGNIFICANT CHANGE UP
LACTATE SERPL-SCNC: 1.2 MMOL/L — SIGNIFICANT CHANGE UP (ref 0.7–2)
LEUKOCYTE ESTERASE UR-ACNC: ABNORMAL
MCHC RBC-ENTMCNC: 30.3 PG — SIGNIFICANT CHANGE UP (ref 27–34)
MCHC RBC-ENTMCNC: 33.4 GM/DL — SIGNIFICANT CHANGE UP (ref 32–36)
MCV RBC AUTO: 90.8 FL — SIGNIFICANT CHANGE UP (ref 80–100)
NITRITE UR-MCNC: NEGATIVE — SIGNIFICANT CHANGE UP
PH UR: 6 — SIGNIFICANT CHANGE UP (ref 5–8)
PLATELET # BLD AUTO: 224 K/UL — SIGNIFICANT CHANGE UP (ref 150–400)
PROT UR-MCNC: 100 MG/DL
RBC # BLD: 3.74 M/UL — LOW (ref 3.8–5.2)
RBC # FLD: 13.9 % — SIGNIFICANT CHANGE UP (ref 10.3–14.5)
SP GR SPEC: 1.01 — SIGNIFICANT CHANGE UP (ref 1.01–1.02)
UROBILINOGEN FLD QL: 1 MG/DL — SIGNIFICANT CHANGE UP
WBC # BLD: 11.6 K/UL — HIGH (ref 3.8–10.5)
WBC # FLD AUTO: 11.6 K/UL — HIGH (ref 3.8–10.5)

## 2018-08-22 PROCEDURE — 71045 X-RAY EXAM CHEST 1 VIEW: CPT | Mod: 26

## 2018-08-22 PROCEDURE — 11012 DEB SKIN BONE AT FX SITE: CPT | Mod: 78,RT

## 2018-08-22 RX ORDER — ACETAMINOPHEN 500 MG
650 TABLET ORAL EVERY 6 HOURS
Qty: 0 | Refills: 0 | Status: DISCONTINUED | OUTPATIENT
Start: 2018-08-22 | End: 2018-09-07

## 2018-08-22 RX ADMIN — QUETIAPINE FUMARATE 50 MILLIGRAM(S): 200 TABLET, FILM COATED ORAL at 01:38

## 2018-08-22 RX ADMIN — Medication 100 MILLIGRAM(S): at 05:58

## 2018-08-22 RX ADMIN — Medication 81 MILLIGRAM(S): at 12:03

## 2018-08-22 RX ADMIN — Medication 1 PATCH: at 11:58

## 2018-08-22 RX ADMIN — ENOXAPARIN SODIUM 40 MILLIGRAM(S): 100 INJECTION SUBCUTANEOUS at 12:03

## 2018-08-22 RX ADMIN — POLYETHYLENE GLYCOL 3350 17 GRAM(S): 17 POWDER, FOR SOLUTION ORAL at 12:03

## 2018-08-22 RX ADMIN — PANTOPRAZOLE SODIUM 40 MILLIGRAM(S): 20 TABLET, DELAYED RELEASE ORAL at 06:09

## 2018-08-22 RX ADMIN — GABAPENTIN 300 MILLIGRAM(S): 400 CAPSULE ORAL at 21:34

## 2018-08-22 RX ADMIN — TRAMADOL HYDROCHLORIDE 50 MILLIGRAM(S): 50 TABLET ORAL at 01:38

## 2018-08-22 RX ADMIN — GABAPENTIN 300 MILLIGRAM(S): 400 CAPSULE ORAL at 05:58

## 2018-08-22 RX ADMIN — TRAMADOL HYDROCHLORIDE 50 MILLIGRAM(S): 50 TABLET ORAL at 13:00

## 2018-08-22 RX ADMIN — Medication 50 MILLIGRAM(S): at 05:57

## 2018-08-22 RX ADMIN — Medication 100 MILLIGRAM(S): at 12:03

## 2018-08-22 RX ADMIN — Medication 0.5 MILLIGRAM(S): at 17:51

## 2018-08-22 RX ADMIN — TRAMADOL HYDROCHLORIDE 50 MILLIGRAM(S): 50 TABLET ORAL at 19:06

## 2018-08-22 RX ADMIN — HYDROMORPHONE HYDROCHLORIDE 8 MILLIGRAM(S): 2 INJECTION INTRAMUSCULAR; INTRAVENOUS; SUBCUTANEOUS at 08:43

## 2018-08-22 RX ADMIN — OXYCODONE HYDROCHLORIDE 20 MILLIGRAM(S): 5 TABLET ORAL at 19:02

## 2018-08-22 RX ADMIN — GABAPENTIN 300 MILLIGRAM(S): 400 CAPSULE ORAL at 16:46

## 2018-08-22 RX ADMIN — OXYCODONE HYDROCHLORIDE 20 MILLIGRAM(S): 5 TABLET ORAL at 06:09

## 2018-08-22 RX ADMIN — Medication 650 MILLIGRAM(S): at 05:58

## 2018-08-22 RX ADMIN — HYDROMORPHONE HYDROCHLORIDE 8 MILLIGRAM(S): 2 INJECTION INTRAMUSCULAR; INTRAVENOUS; SUBCUTANEOUS at 16:44

## 2018-08-22 RX ADMIN — QUETIAPINE FUMARATE 50 MILLIGRAM(S): 200 TABLET, FILM COATED ORAL at 10:08

## 2018-08-22 RX ADMIN — HYDROMORPHONE HYDROCHLORIDE 8 MILLIGRAM(S): 2 INJECTION INTRAMUSCULAR; INTRAVENOUS; SUBCUTANEOUS at 17:45

## 2018-08-22 RX ADMIN — OXYCODONE HYDROCHLORIDE 20 MILLIGRAM(S): 5 TABLET ORAL at 17:50

## 2018-08-22 RX ADMIN — TRAMADOL HYDROCHLORIDE 50 MILLIGRAM(S): 50 TABLET ORAL at 12:04

## 2018-08-22 RX ADMIN — CARVEDILOL PHOSPHATE 12.5 MILLIGRAM(S): 80 CAPSULE, EXTENDED RELEASE ORAL at 05:58

## 2018-08-22 RX ADMIN — Medication 100 MILLIGRAM(S): at 17:51

## 2018-08-22 RX ADMIN — HYDROMORPHONE HYDROCHLORIDE 8 MILLIGRAM(S): 2 INJECTION INTRAMUSCULAR; INTRAVENOUS; SUBCUTANEOUS at 09:30

## 2018-08-22 RX ADMIN — Medication 90 MILLIGRAM(S): at 05:57

## 2018-08-22 RX ADMIN — HYDROMORPHONE HYDROCHLORIDE 8 MILLIGRAM(S): 2 INJECTION INTRAMUSCULAR; INTRAVENOUS; SUBCUTANEOUS at 21:55

## 2018-08-22 RX ADMIN — QUETIAPINE FUMARATE 150 MILLIGRAM(S): 200 TABLET, FILM COATED ORAL at 21:34

## 2018-08-22 RX ADMIN — Medication 650 MILLIGRAM(S): at 17:17

## 2018-08-22 RX ADMIN — Medication 1 MILLIGRAM(S): at 12:03

## 2018-08-22 RX ADMIN — QUETIAPINE FUMARATE 50 MILLIGRAM(S): 200 TABLET, FILM COATED ORAL at 16:47

## 2018-08-22 NOTE — PROGRESS NOTE ADULT - SUBJECTIVE AND OBJECTIVE BOX
MEDICATIONS  (STANDING):  aspirin enteric coated 81 milliGRAM(s) Oral daily  buDESOnide   0.5 milliGRAM(s) Respule 0.5 milliGRAM(s) Inhalation two times a day  carvedilol 12.5 milliGRAM(s) Oral every 12 hours  docusate sodium 100 milliGRAM(s) Oral two times a day  enoxaparin Injectable 40 milliGRAM(s) SubCutaneous daily  folic acid 1 milliGRAM(s) Oral daily  gabapentin 300 milliGRAM(s) Oral three times a day  hydrALAZINE 50 milliGRAM(s) Oral every 8 hours  nicotine - 21 mG/24Hr(s) Patch 1 patch Transdermal daily  NIFEdipine XL 90 milliGRAM(s) Oral daily  oxyCODONE  ER Tablet 20 milliGRAM(s) Oral every 12 hours  pantoprazole    Tablet 40 milliGRAM(s) Oral before breakfast  polyethylene glycol 3350 17 Gram(s) Oral daily  QUEtiapine 150 milliGRAM(s) Oral at bedtime  senna 2 Tablet(s) Oral at bedtime  thiamine 100 milliGRAM(s) Oral daily  traMADol 50 milliGRAM(s) Oral every 6 hours    MEDICATIONS  (PRN):  acetaminophen   Tablet 650 milliGRAM(s) Oral every 6 hours PRN For Temp greater than 38.5 C (101.3 F)  acetaminophen   Tablet. 650 milliGRAM(s) Oral every 6 hours PRN Mild Pain (1 - 3)  HYDROmorphone   Tablet 4 milliGRAM(s) Oral every 3 hours PRN Moderate Pain (4 - 6)  HYDROmorphone   Tablet 8 milliGRAM(s) Oral every 3 hours PRN Severe Pain (7 - 10)  QUEtiapine 50 milliGRAM(s) Oral every 6 hours PRN anxiety/insomnia          VITALS:   T(C): 38.4 (18 @ 17:14), Max: 39.4 (18 @ 06:29)  HR: 80 (18 @ 17:14) (68 - 96)  BP: 105/61 (18 @ 17:14) (105/53 - 161/68)  RR: 18 (18 @ 17:14) (18 - 18)  SpO2: 95% (18 @ 17:14) (95% - 97%)  Wt(kg): --    PHYSICAL EXAM:  GENERAL: NAD, well nourished and conversant  HEAD:  Atraumatic  EYES: EOM, PERRLA, conjunctiva pink and sclera white  ENT: No tonsillar erythema, exudates, or enlargement, moist mucous membranes, good dentition, no lesions  NECK: Supple, No JVD, normal thyroid, carotids with normal upstrokes and no bruits  CHEST/LUNG: soft rales at the left base  HEART: Regular rate and rhythm, No murmurs, rubs, or gallops  ABDOMEN: Soft, nondistended, no masses, guarding, tenderness or rebound, bowel sounds present  EXTREMITIES:  2+ Peripheral Pulses, No clubbing, cyanosis, or edema. (+) right periprosthetic frature site   LYMPH: No lymphadenopathy noted  SKIN: No rashes or lesions  NERVOUS SYSTEM:  Alert & Oriented X3, normal cognitive function. Motor Strength 5/5 right upper and right lower.  5/5 left upper and left lower extremities, DTRs 2+ intact and symmetric    LABS:        CBC Full  -  ( 22 Aug 2018 09:06 )  WBC Count : 11.6 K/uL  Hemoglobin : 11.3 g/dL  Hematocrit : 34.0 %  Platelet Count - Automated : 224 K/uL  Mean Cell Volume : 90.8 fl  Mean Cell Hemoglobin : 30.3 pg  Mean Cell Hemoglobin Concentration : 33.4 gm/dL  Auto Neutrophil # : x  Auto Lymphocyte # : x  Auto Monocyte # : x  Auto Eosinophil # : x  Auto Basophil # : x  Auto Neutrophil % : x  Auto Lymphocyte % : x  Auto Monocyte % : x  Auto Eosinophil % : x  Auto Basophil % : x    08-21    138  |  103  |  32<H>  ----------------------------<  83  4.0   |  22  |  0.89    Ca    8.7      21 Aug 2018 07:02          Urinalysis Basic - ( 22 Aug 2018 10:15 )    Color: Yellow / Appearance: Slightly Turbid / S.013 / pH: x  Gluc: x / Ketone: Negative  / Bili: Negative / Urobili: 1.0 mg/dL   Blood: x / Protein: 100 mg/dL / Nitrite: Negative   Leuk Esterase: Moderate / RBC: 136 /HPF / WBC 31 /HPF   Sq Epi: x / Non Sq Epi: 0 /HPF / Bacteria: Negative      CAPILLARY BLOOD GLUCOSE          RADIOLOGY & ADDITIONAL TESTS:

## 2018-08-22 NOTE — CHART NOTE - NSCHARTNOTEFT_GEN_A_CORE
Fever     Notified by RN at 0630 that patient had a temp pf 102.9. Patient pleasantly confused ambulating with staff this am. Denies cp, sob, urinary complaints, palpitations, HA    Vital Signs Last 24 Hrs  T(C): 39.4 (22 Aug 2018 06:29), Max: 39.4 (22 Aug 2018 06:29)  T(F): 102.9 (22 Aug 2018 06:29), Max: 102.9 (22 Aug 2018 06:29)  HR: 88 (22 Aug 2018 06:29) (75 - 96)  BP: 161/68 (22 Aug 2018 06:29) (124/60 - 161/68)  BP(mean): --  RR: 18 (22 Aug 2018 06:29) (18 - 20)  SpO2: 95% (22 Aug 2018 06:29) (95% - 99%)    MEDICATIONS  (STANDING):  aspirin enteric coated 81 milliGRAM(s) Oral daily  buDESOnide   0.5 milliGRAM(s) Respule 0.5 milliGRAM(s) Inhalation two times a day  carvedilol 12.5 milliGRAM(s) Oral every 12 hours  docusate sodium 100 milliGRAM(s) Oral two times a day  enoxaparin Injectable 40 milliGRAM(s) SubCutaneous daily  folic acid 1 milliGRAM(s) Oral daily  gabapentin 300 milliGRAM(s) Oral three times a day  hydrALAZINE 50 milliGRAM(s) Oral every 8 hours  nicotine - 21 mG/24Hr(s) Patch 1 patch Transdermal daily  NIFEdipine XL 90 milliGRAM(s) Oral daily  oxyCODONE  ER Tablet 20 milliGRAM(s) Oral every 12 hours  pantoprazole    Tablet 40 milliGRAM(s) Oral before breakfast  polyethylene glycol 3350 17 Gram(s) Oral daily  QUEtiapine 150 milliGRAM(s) Oral at bedtime  senna 2 Tablet(s) Oral at bedtime  thiamine 100 milliGRAM(s) Oral daily  traMADol 50 milliGRAM(s) Oral every 6 hours    MEDICATIONS  (PRN):  acetaminophen   Tablet. 650 milliGRAM(s) Oral every 6 hours PRN Mild Pain (1 - 3)  HYDROmorphone   Tablet 4 milliGRAM(s) Oral every 3 hours PRN Moderate Pain (4 - 6)  HYDROmorphone   Tablet 8 milliGRAM(s) Oral every 3 hours PRN Severe Pain (7 - 10)  QUEtiapine 50 milliGRAM(s) Oral every 6 hours PRN anxiety/insomnia    Plan  Chest xray  U/A  urine cx  blood cx  cooling measures  acetaminophen    Will endorse to primary day team, attending to follow    Amarilys Ansari Np  spectralink [phone]

## 2018-08-23 LAB
ANION GAP SERPL CALC-SCNC: 13 MMOL/L — SIGNIFICANT CHANGE UP (ref 5–17)
BUN SERPL-MCNC: 30 MG/DL — HIGH (ref 7–23)
CALCIUM SERPL-MCNC: 9.1 MG/DL — SIGNIFICANT CHANGE UP (ref 8.4–10.5)
CHLORIDE SERPL-SCNC: 92 MMOL/L — LOW (ref 96–108)
CO2 SERPL-SCNC: 24 MMOL/L — SIGNIFICANT CHANGE UP (ref 22–31)
CREAT SERPL-MCNC: 0.99 MG/DL — SIGNIFICANT CHANGE UP (ref 0.5–1.3)
GLUCOSE SERPL-MCNC: 127 MG/DL — HIGH (ref 70–99)
GRAM STN FLD: SIGNIFICANT CHANGE UP
HCT VFR BLD CALC: 32.8 % — LOW (ref 34.5–45)
HGB BLD-MCNC: 10.4 G/DL — LOW (ref 11.5–15.5)
MCHC RBC-ENTMCNC: 29.1 PG — SIGNIFICANT CHANGE UP (ref 27–34)
MCHC RBC-ENTMCNC: 31.7 GM/DL — LOW (ref 32–36)
MCV RBC AUTO: 91.8 FL — SIGNIFICANT CHANGE UP (ref 80–100)
METHOD TYPE: SIGNIFICANT CHANGE UP
MRSA SPEC QL CULT: SIGNIFICANT CHANGE UP
PLATELET # BLD AUTO: 226 K/UL — SIGNIFICANT CHANGE UP (ref 150–400)
POTASSIUM SERPL-MCNC: 4.2 MMOL/L — SIGNIFICANT CHANGE UP (ref 3.5–5.3)
POTASSIUM SERPL-SCNC: 4.2 MMOL/L — SIGNIFICANT CHANGE UP (ref 3.5–5.3)
RBC # BLD: 3.57 M/UL — LOW (ref 3.8–5.2)
RBC # FLD: 14.4 % — SIGNIFICANT CHANGE UP (ref 10.3–14.5)
SODIUM SERPL-SCNC: 129 MMOL/L — LOW (ref 135–145)
SPECIMEN SOURCE: SIGNIFICANT CHANGE UP
WBC # BLD: 9.5 K/UL — SIGNIFICANT CHANGE UP (ref 3.8–10.5)
WBC # FLD AUTO: 9.5 K/UL — SIGNIFICANT CHANGE UP (ref 3.8–10.5)

## 2018-08-23 PROCEDURE — 99223 1ST HOSP IP/OBS HIGH 75: CPT | Mod: GC

## 2018-08-23 RX ORDER — VANCOMYCIN HCL 1 G
1000 VIAL (EA) INTRAVENOUS ONCE
Qty: 0 | Refills: 0 | Status: COMPLETED | OUTPATIENT
Start: 2018-08-23 | End: 2018-08-23

## 2018-08-23 RX ORDER — VANCOMYCIN HCL 1 G
750 VIAL (EA) INTRAVENOUS EVERY 12 HOURS
Qty: 0 | Refills: 0 | Status: DISCONTINUED | OUTPATIENT
Start: 2018-08-23 | End: 2018-08-25

## 2018-08-23 RX ADMIN — Medication 250 MILLIGRAM(S): at 18:35

## 2018-08-23 RX ADMIN — GABAPENTIN 300 MILLIGRAM(S): 400 CAPSULE ORAL at 21:06

## 2018-08-23 RX ADMIN — OXYCODONE HYDROCHLORIDE 20 MILLIGRAM(S): 5 TABLET ORAL at 10:45

## 2018-08-23 RX ADMIN — Medication 1 MILLIGRAM(S): at 12:38

## 2018-08-23 RX ADMIN — QUETIAPINE FUMARATE 50 MILLIGRAM(S): 200 TABLET, FILM COATED ORAL at 14:48

## 2018-08-23 RX ADMIN — TRAMADOL HYDROCHLORIDE 50 MILLIGRAM(S): 50 TABLET ORAL at 18:51

## 2018-08-23 RX ADMIN — OXYCODONE HYDROCHLORIDE 20 MILLIGRAM(S): 5 TABLET ORAL at 09:45

## 2018-08-23 RX ADMIN — GABAPENTIN 300 MILLIGRAM(S): 400 CAPSULE ORAL at 06:15

## 2018-08-23 RX ADMIN — PANTOPRAZOLE SODIUM 40 MILLIGRAM(S): 20 TABLET, DELAYED RELEASE ORAL at 06:15

## 2018-08-23 RX ADMIN — Medication 0.5 MILLIGRAM(S): at 18:36

## 2018-08-23 RX ADMIN — Medication 1 PATCH: at 12:38

## 2018-08-23 RX ADMIN — HYDROMORPHONE HYDROCHLORIDE 8 MILLIGRAM(S): 2 INJECTION INTRAMUSCULAR; INTRAVENOUS; SUBCUTANEOUS at 14:40

## 2018-08-23 RX ADMIN — ENOXAPARIN SODIUM 40 MILLIGRAM(S): 100 INJECTION SUBCUTANEOUS at 12:38

## 2018-08-23 RX ADMIN — Medication 650 MILLIGRAM(S): at 17:10

## 2018-08-23 RX ADMIN — GABAPENTIN 300 MILLIGRAM(S): 400 CAPSULE ORAL at 16:10

## 2018-08-23 RX ADMIN — OXYCODONE HYDROCHLORIDE 20 MILLIGRAM(S): 5 TABLET ORAL at 22:35

## 2018-08-23 RX ADMIN — Medication 100 MILLIGRAM(S): at 12:39

## 2018-08-23 RX ADMIN — OXYCODONE HYDROCHLORIDE 20 MILLIGRAM(S): 5 TABLET ORAL at 22:02

## 2018-08-23 RX ADMIN — HYDROMORPHONE HYDROCHLORIDE 8 MILLIGRAM(S): 2 INJECTION INTRAMUSCULAR; INTRAVENOUS; SUBCUTANEOUS at 23:16

## 2018-08-23 RX ADMIN — Medication 250 MILLIGRAM(S): at 06:16

## 2018-08-23 RX ADMIN — Medication 81 MILLIGRAM(S): at 12:38

## 2018-08-23 RX ADMIN — Medication 650 MILLIGRAM(S): at 16:10

## 2018-08-23 RX ADMIN — TRAMADOL HYDROCHLORIDE 50 MILLIGRAM(S): 50 TABLET ORAL at 06:22

## 2018-08-23 RX ADMIN — Medication 0.5 MILLIGRAM(S): at 06:21

## 2018-08-23 RX ADMIN — QUETIAPINE FUMARATE 150 MILLIGRAM(S): 200 TABLET, FILM COATED ORAL at 21:06

## 2018-08-23 RX ADMIN — Medication 100 MILLIGRAM(S): at 17:51

## 2018-08-23 RX ADMIN — POLYETHYLENE GLYCOL 3350 17 GRAM(S): 17 POWDER, FOR SOLUTION ORAL at 12:39

## 2018-08-23 RX ADMIN — HYDROMORPHONE HYDROCHLORIDE 8 MILLIGRAM(S): 2 INJECTION INTRAMUSCULAR; INTRAVENOUS; SUBCUTANEOUS at 18:57

## 2018-08-23 RX ADMIN — HYDROMORPHONE HYDROCHLORIDE 8 MILLIGRAM(S): 2 INJECTION INTRAMUSCULAR; INTRAVENOUS; SUBCUTANEOUS at 13:40

## 2018-08-23 RX ADMIN — Medication 100 MILLIGRAM(S): at 06:15

## 2018-08-23 RX ADMIN — TRAMADOL HYDROCHLORIDE 50 MILLIGRAM(S): 50 TABLET ORAL at 17:51

## 2018-08-23 RX ADMIN — HYDROMORPHONE HYDROCHLORIDE 8 MILLIGRAM(S): 2 INJECTION INTRAMUSCULAR; INTRAVENOUS; SUBCUTANEOUS at 23:46

## 2018-08-23 NOTE — PROGRESS NOTE ADULT - SUBJECTIVE AND OBJECTIVE BOX
Ortho Progress Note    S: Patient seen and examined. No acute events overnight. Pain well controlled with current regimen.       O:  Physical Exam:  Gen: Laying in bed, NAD, alert and oriented.   Resp: Unlabored breathing  Ext: EHL/FHL/TA/Sol intact          + SILT DP/SP/MCDANIEL/Sa          +DP, extremity WWP  Incision: c/d/i,       Vital Signs Last 24 Hrs  T(C): 36.9 (23 Aug 2018 15:39), Max: 38.1 (23 Aug 2018 05:57)  T(F): 98.4 (23 Aug 2018 15:39), Max: 100.5 (23 Aug 2018 05:57)  HR: 80 (23 Aug 2018 15:39) (62 - 88)  BP: 94/55 (23 Aug 2018 15:39) (94/55 - 138/66)  BP(mean): --  RR: 18 (23 Aug 2018 15:39) (16 - 18)  SpO2: 97% (23 Aug 2018 14:33) (91% - 98%)                          10.4   9.5   )-----------( 226      ( 23 Aug 2018 15:04 )             32.8                         11.3   11.6  )-----------( 224      ( 22 Aug 2018 09:06 )             34.0       08-23    129<L>  |  92<L>  |  30<H>  ----------------------------<  127<H>  4.2   |  24  |  0.99

## 2018-08-23 NOTE — CONSULT NOTE ADULT - ASSESSMENT
72F PMH of emphysema, CAD, HTN, R hip fracture (2015) w/pin placement c/b avascular necrosis (2017) necessitating THR, kika-prosthetic R femur fx (s/p Total Hip revision with ORIF, 6/30/18), recent re-admission (07/16 - 07/24) for worsening R hip pain and decreased ability to ambulate now s/p R femur vancouver B1 periprosthetic fracture ORIF (07/19) who presented to the ED on 8/20 w/R hip and thigh pain w/associated swelling, stiffness, and redness in the setting of a mechanical fall on the stairs, now found to have fevers to 102.9 with MRSA bacteremia 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 THR, kika-prosthetic R femur fx (s/p Total Hip revision with ORIF, 6/30/18), recent re-admission (07/16 - 07/24) for worsening R hip pain and decreased ability to ambulate now s/p R femur vancouver B1 periprosthetic fracture ORIF (07/19) who presented to the ED on 8/20 with R hip and thigh pain, swelling, stiffness, and redness in the setting of a mechanical fall on the stairs  in the hospital spiked to 102.9, blood cx with MRSA  LE CT with lucency at the acetabular screw and prox fem, fluid collection in lateral soft tissue of thigh, an other fluid collection next to vastus intermedius    high fever, MRSA bacteremia with hip hardware collection in view of complicated hip surgical history and recent ORIF    * repeat the blood cx x 2  * start vanco 750 q 12  * check the trough before the 4th dose  * ortho f/u for collection drainage and pt will likely need hardware removal  * psych consult

## 2018-08-23 NOTE — CHART NOTE - NSCHARTNOTEFT_GEN_A_CORE
Culture - Blood (08.22.18 @ 12:53)    -  Methicillin resistant Staphylococcus aureus (MRSA): Detec    Gram Stain:   Growth in aerobic bottle: Gram Positive Cocci in Clusters    Specimen Source: .Blood Blood    Organism: Blood Culture PCR    Culture Results:   Growth in aerobic bottle: Gram Positive Cocci in Clusters      Plan  draw remaining blood culture set  vancomycin 1 gram x1   Will endorse to primary day team, attending to follow    Amarilys Ansari NP  spectralink [phone]

## 2018-08-23 NOTE — CHART NOTE - NSCHARTNOTEFT_GEN_A_CORE
Discussed case with IR. Will get US to rule out distal, deep abscess at end of the plate, as it was obscured by the metal artifact.     IR consult in the morning to drain superficial collection.    Per discussion with Dr. Be, will hold of aspiration the hip.

## 2018-08-23 NOTE — CONSULT NOTE ADULT - SUBJECTIVE AND OBJECTIVE BOX
Patient is a 72y old  Female who presents with a chief complaint of fall (20 Aug 2018 19:53)      HPI and Hospital Course:  72F PMH of emphysema, CAD, HTN, R hip fracture () w/pin placement c/b avascular necrosis () necessitating THR, kika-prosthetic R femur fx (s/p Total Hip revision with ORIF, 18), recent re-admission ( - ) for worsening R hip pain and decreased ability to ambulate now s/p R femur [place] B1 periprosthetic fracture ORIF () who presented to the ED on  w/R hip and thigh pain w/associated swelling, stiffness, and redness in the setting of a mechanical fall on the stairs. She had no other complaints on admission. Patient’s initial VS were appropriate and labs notable for WBC 6.5 (83.5% neutrophils, 0.5% lymphocytes), BUN/Cr (46/1.11, baseline Cr ~.85), protein gap 4.5, Alk P 197, UA negative with UCx NGTD. She had a CT head which was negative for acute pathology. X-ray R hip/pelvis/femur demonstrated intact and in place prosthesis w/new heterotopic ossification surrounding the proximal/mid shaft of the right femur and calcified vasculature. Follow-up CT of the R hip demonstrated no acute fracture but did note soft tissue swelling and fluid collection within the lateral soft tissues of the thigh, differential including hematoma, seroma, and Martinez Chari lesion, superimposed infection not excluded. Additional suspected edema/fluid anterior to the femur adjacent to the vastus intermedius and may represent seroma, hematoma, adverse reaction to metal, and infection. Duplex of RLE veins performed for RLE swelling demonstrated no evidence of DVT.  At 6:30 am yesterday, patient spiked a fever to 102.9, at which time she was documented as "pleasantly confused" and ambulating w/staff, denying cp, sob, urinary complaints, palpitations, HA. She continued to spike fevers (101.2 at 5 pm yesterday, 100.5 at 6 am today) with otherwise stable VS. Portable CXR this AM demonstrated a patchy opacity at the R lung base as on the prior study () representing atelectasis and/or pneumonia given her new fevers. Labs  were significant for WBC 11.6, improvement in BUN/Cr to near baseline, normal blood lactate 1.2, and a UA that was completely negative on admission now resulting as slightly turbid, 100 protein, WBC 31, moderate LE, negative nitrite, large blood w/136 RBC, no bacteria. BCx x1 was drawn (Pt refused 2nd) which have revealed MRSA by PCR. An additional blood culture and urine culture drawn this AM, and she received her first dose of Abx this am (Vanc IV 1g) s/p result of MRSA PCR in blood.   ID consulted for fever and bacteremia.        PAST MEDICAL & SURGICAL HISTORY:  Pain of right hip joint  Migraine  Alcohol abuse  Seizure  Stented coronary artery  Coronary artery disease  Anxiety  HTN (hypertension)  Emphysema, unspecified  Anxiety  Migraine  CAD (coronary artery disease)  Hyperlipidemia  Hypertension  Status post total hip replacement, right: 18  S/P bladder repair      Allergies  No Known Allergies        ANTIMICROBIALS:      OTHER MEDS: MEDICATIONS  (STANDING):  acetaminophen   Tablet 650 every 6 hours PRN  acetaminophen   Tablet. 650 every 6 hours PRN  aspirin enteric coated 81 daily  buDESOnide   0.5 milliGRAM(s) Respule 0.5 two times a day  carvedilol 12.5 every 12 hours  docusate sodium 100 two times a day  enoxaparin Injectable 40 daily  gabapentin 300 three times a day  hydrALAZINE 50 every 8 hours  HYDROmorphone   Tablet 4 every 3 hours PRN  HYDROmorphone   Tablet 8 every 3 hours PRN  NIFEdipine XL 90 daily  oxyCODONE  ER Tablet 20 every 12 hours  pantoprazole    Tablet 40 before breakfast  polyethylene glycol 3350 17 daily  QUEtiapine 50 every 6 hours PRN  QUEtiapine 150 at bedtime  senna 2 at bedtime  traMADol 50 every 6 hours      SOCIAL HISTORY:       FAMILY HISTORY:  Family history of coronary artery disease in daughter (Child)      REVIEW OF SYSTEMS  [  ] ROS unobtainable because:    [  ] All other systems negative except as noted below:	    Constitutional:  [ ] fever [ ] chills  [ ] weight loss  [ ] weakness  Skin:  [ ] rash [ ] phlebitis	  Eyes: [ ] icterus [ ] pain  [ ] discharge	  ENMT: [ ] sore throat  [ ] thrush [ ] ulcers [ ] exudates  Respiratory: [ ] dyspnea [ ] hemoptysis [ ] cough [ ] sputum	  Cardiovascular:  [ ] chest pain [ ] palpitations [ ] edema	  Gastrointestinal:  [ ] nausea [ ] vomiting [ ] diarrhea [ ] constipation [ ] pain	  Genitourinary:  [ ] dysuria [ ] frequency [ ] hematuria [ ] discharge [ ] flank pain  [ ] incontinence  Musculoskeletal:  [ ] myalgias [ ] arthralgias [ ] arthritis  [ ] back pain  Neurological:  [ ] headache [ ] seizures  [ ] confusion/altered mental status  Psychiatric:  [ ] anxiety [ ] depression	  Hematology/Lymphatics:  [ ] lymphadenopathy  Endocrine:  [ ] adrenal [ ] thyroid  Allergic/Immunologic:	 [ ] transplant [ ] seasonal    Vital Signs Last 24 Hrs  T(F): 97.5 (18 @ 09:49), Max: 102.9 (18 @ 06:29)    Vital Signs Last 24 Hrs  HR: 82 (18 @ 09:49) (62 - 82)  BP: 138/66 (18 @ 09:49) (105/46 - 138/66)  RR: 18 (18 @ 09:49)  SpO2: 96% (18 @ 09:49) (91% - 98%)  Wt(kg): --    PHYSICAL EXAM:  General: non-toxic  HEAD/EYES: anicteric, PERRL  ENT:  supple  Cardiovascular:   S1, S2  Respiratory:  clear bilaterally  GI:  soft, non-tender, normal bowel sounds  :  no CVA tenderness   Musculoskeletal:  no synovitis  Neurologic:  grossly non-focal  Skin:  no rash  Lymph: no lymphadenopathy  Psychiatric:  appropriate affect  Vascular:  no phlebitis                                11.3   11.6  )-----------( 224      ( 22 Aug 2018 09:06 )             34.0               Urinalysis Basic - ( 22 Aug 2018 10:15 )    Color: Yellow / Appearance: Slightly Turbid / S.013 / pH: x  Gluc: x / Ketone: Negative  / Bili: Negative / Urobili: 1.0 mg/dL   Blood: x / Protein: 100 mg/dL / Nitrite: Negative   Leuk Esterase: Moderate / RBC: 136 /HPF / WBC 31 /HPF   Sq Epi: x / Non Sq Epi: 0 /HPF / Bacteria: Negative        MICROBIOLOGY:    Culture - Blood (collected 18 @ 12:53)  Source: .Blood Blood  Gram Stain (18 @ 02:23):    Growth in aerobic bottle: Gram Positive Cocci in Clusters  Preliminary Report (18 @ 02:23):    Growth in aerobic bottle: Gram Positive Cocci in Clusters    "Due to technical problems, Proteus sp. will Not be reported as part of    the BCID panel until further notice"    ***Blood Panel PCR results on this specimen are available    approximately 3 hours after the Gram stain result.***    Gram stain, PCR, and/or culture results may not always    correspond due to difference in methodologies.    ************************************************************    This PCR assay was performed using [employer].    The following targets are tested for: Enterococcus,    vancomycin resistant enterococci, Listeria monocytogenes,    coagulase negative staphylococci, S. aureus,    methicillin resistant S. aureus, Streptococcus agalactiae    (Group B), S. pneumoniae, S.pyogenes (Group A),    Acinetobacter baumannii, Enterobacter cloacae, E. coli,    Klebsiella oxytoca, K. pneumoniae, Proteus sp.,    Serratia marcescens, Haemophilus influenzae,    Neisseria meningitidis, Pseudomonas aeruginosa, Candida    albicans, C. glabrata, C krusei, C parapsilosis,    C. tropicalis and the KPC resistance gene.  Organism: Blood Culture PCR (18 @ 03:54)  Organism: Blood Culture PCR (18 @ 03:54)      -  Methicillin resistant Staphylococcus aureus (MRSA): Detec      Method Type: PCR    Culture - Urine (collected 18 @ 19:46)  Source: .Urine Catheterized  Final Report (18 @ 21:55):    No growth            v            RADIOLOGY:  imaging below personally reviewed Patient is a 72y old  Female who presents with a chief complaint of fall (20 Aug 2018 19:53)      HPI and Hospital Course:  72F PMH of emphysema, CAD, HTN, R hip fracture () w/pin placement c/b avascular necrosis () necessitating THR, kika-prosthetic R femur fx (s/p Total Hip revision with ORIF, 18), recent re-admission ( - ) for worsening R hip pain and decreased ability to ambulate now s/p R femur [place] B1 periprosthetic fracture ORIF () who presented to the ED on  w/R hip and thigh pain w/associated swelling, stiffness, and redness in the setting of a mechanical fall on the stairs. She had no other complaints on admission. Patient’s initial VS were appropriate and labs notable for WBC 6.5 (83.5% neutrophils, 0.5% lymphocytes), BUN/Cr (46/1.11, baseline Cr ~.85), protein gap 4.5, Alk P 197, UA negative with UCx NGTD. She had a CT head which was negative for acute pathology. X-ray R hip/pelvis/femur demonstrated intact and in place prosthesis w/new heterotopic ossification surrounding the proximal/mid shaft of the right femur and calcified vasculature. Follow-up CT of the R hip demonstrated no acute fracture but did note soft tissue swelling and fluid collection within the lateral soft tissues of the thigh, differential including hematoma, seroma, and Martinez Chari lesion, superimposed infection not excluded. Additional suspected edema/fluid anterior to the femur adjacent to the vastus intermedius and may represent seroma, hematoma, adverse reaction to metal, and infection. Duplex of RLE veins performed for RLE swelling demonstrated no evidence of DVT.  At 6:30 am yesterday, patient spiked a fever to 102.9, at which time she was documented as "pleasantly confused" and ambulating w/staff, denying cp, sob, urinary complaints, palpitations, HA. She continued to spike fevers (101.2 at 5 pm yesterday, 100.5 at 6 am today) with otherwise stable VS. Portable CXR this AM demonstrated a patchy opacity at the R lung base as on the prior study () representing atelectasis and/or pneumonia given her new fevers. Labs  were significant for WBC 11.6, improvement in BUN/Cr to near baseline, normal blood lactate 1.2, and a UA that was completely negative on admission now resulting as slightly turbid, 100 protein, WBC 31, moderate LE, negative nitrite, large blood w/136 RBC, no bacteria. BCx x1 was drawn (Pt refused 2nd) which have revealed MRSA by PCR. An additional blood culture and urine culture drawn this AM, and she received her first dose of Abx this am (Vanc IV 1g) s/p result of MRSA PCR in blood.   ID consulted for fever and bacteremia.        PAST MEDICAL & SURGICAL HISTORY:  Pain of right hip joint  Migraine  Alcohol abuse  Seizure  Stented coronary artery  Coronary artery disease  Anxiety  HTN (hypertension)  Emphysema, unspecified  Anxiety  Migraine  CAD (coronary artery disease)  Hyperlipidemia  Hypertension  Status post total hip replacement, right: 18  S/P bladder repair      Allergies  No Known Allergies        ANTIMICROBIALS:      OTHER MEDS: MEDICATIONS  (STANDING):  acetaminophen   Tablet 650 every 6 hours PRN  acetaminophen   Tablet. 650 every 6 hours PRN  aspirin enteric coated 81 daily  buDESOnide   0.5 milliGRAM(s) Respule 0.5 two times a day  carvedilol 12.5 every 12 hours  docusate sodium 100 two times a day  enoxaparin Injectable 40 daily  gabapentin 300 three times a day  hydrALAZINE 50 every 8 hours  HYDROmorphone   Tablet 4 every 3 hours PRN  HYDROmorphone   Tablet 8 every 3 hours PRN  NIFEdipine XL 90 daily  oxyCODONE  ER Tablet 20 every 12 hours  pantoprazole    Tablet 40 before breakfast  polyethylene glycol 3350 17 daily  QUEtiapine 50 every 6 hours PRN  QUEtiapine 150 at bedtime  senna 2 at bedtime  traMADol 50 every 6 hours      SOCIAL HISTORY:     , the  is in a rehab now, active smoker, denied alcohol or drug absue  FAMILY HISTORY:  Family history of coronary artery disease in daughter (Child)          ROS:      All other systems negative    Constitutional: + fever, + chills  Head: no trauma  Eyes: no vision changes, no eye pain  ENT:  no sore throat, no rhinorrhea  Cardiovascular:  no chest pain, no palpitation  Respiratory:  no SOB, no cough  GI:  no abd pain, no vomiting, no diarrhea  urinary: no dysuria, no hematuria, no flank pain  musculoskeletal:  R thigh pain, edema, erythema  skin:  no rash  neurology:  no headache, no seizure, no change in mental status  psych: no anxiety, no depression       Vital Signs Last 24 Hrs  T(F): 97.5 (18 @ 09:49), Max: 102.9 (18 @ 06:29)    Vital Signs Last 24 Hrs  HR: 82 (18 @ 09:49) (62 - 82)  BP: 138/66 (18 @ 09:49) (105/46 - 138/66)  RR: 18 (18 @ 09:49)  SpO2: 96% (18 @ 09:49) (91% - 98%)  Wt(kg): --    PHYSICAL EXAM:  General: NAD, non-toxic, was walking with a walker and PT  HEAD: atraumatic  EYES: anicteric, PERRL  ENT:  no oropharyngeal lesions, neck supple  Cardiovascular:   regular S1, S2  Respiratory:  clear bilaterally, no wheezing  GI:  soft, non-tender, normal bowel sounds  :  no CVA tenderness , no suprapubic tenderness, no edmondson  Musculoskeletal: R thigh erythema, edema and warmth around the incision, some tenderness  Neurologic:  grossly non-focal  Skin:  no rash  Lymph: no lymphadenopathy  Psychiatric:  pt was paranoid  Vascular:  no phlebitis                                11.3   11.6  )-----------( 224      ( 22 Aug 2018 09:06 )             34.0               Urinalysis Basic - ( 22 Aug 2018 10:15 )    Color: Yellow / Appearance: Slightly Turbid / S.013 / pH: x  Gluc: x / Ketone: Negative  / Bili: Negative / Urobili: 1.0 mg/dL   Blood: x / Protein: 100 mg/dL / Nitrite: Negative   Leuk Esterase: Moderate / RBC: 136 /HPF / WBC 31 /HPF   Sq Epi: x / Non Sq Epi: 0 /HPF / Bacteria: Negative        MICROBIOLOGY:    Culture - Blood (collected 18 @ 12:53)  Source: .Blood Blood  Gram Stain (18 @ 02:23):    Growth in aerobic bottle: Gram Positive Cocci in Clusters  Preliminary Report (18 @ 02:23):    Growth in aerobic bottle: Gram Positive Cocci in Clusters       -  Methicillin resistant Staphylococcus aureus (MRSA): Detec      Method Type: PCR    Culture - Urine (collected 18 @ 19:46)  Source: .Urine Catheterized  Final Report (18 @ 21:55):    No growth            v            RADIOLOGY:  imaging below personally reviewed    < from: CT 3D Reconstruct w/ Workstation (18 @ 12:35) >  IMPRESSION: Limited study by beam hardening artifact from the hardware.    1.  Status post right total hip revision arthroplasty with cerclage wires   and lateral plate and screw fixation in attempt of fixation of mid and   proximal femoral periprosthetic fractures. No acute fracture. Malunion of   the chronic fracture fragments. Mild lucency about the acetabular screw   suggesting early loosening. Scalp lucency at the proximal anterior   femoral component appears chronic suggesting osteolysis versus post   surgical change.  2.  Soft tissue swelling and fluid collection within the lateral soft   tissues of the thigh. Differential includes hematoma, seroma, and Martinez   Chari lesion. Superimposed infection is not excluded.  3.  Suspected edema/fluid anterior to the femur adjacent to the vastus   intermedius and may represent seroma, hematoma, adverse reaction to   metal, and infection.  4.  Degenerative changes of the knee.

## 2018-08-23 NOTE — PROGRESS NOTE ADULT - SUBJECTIVE AND OBJECTIVE BOX
71 yo F, w/ PMH of emphysema, CAD, HTN, revision USAMA with ORIF for periprosthetic R femur fracture on 18 by Dr. Be, discharged on 7/3/18 to [facility] rehab, readmitted from  - 18 w/ worsening R hip pain and decreased ability to ambulate, now s/p right femur [place] B1 periprosthetic fracture ORIF by Dr. Cornell on 18, BIBEMS s/p mechanical fall on stairs during the night c/o R hip and R leg pain, redness, swelling, stiffness. Patient denies other complaints. Denies headache, neck stiffness, fever/chills, vision change, chest pain, shortness of breath, difficulty breathing, palpitations, weakness, dizziness, nausea, vomiting, diarrhea, syncope.   From prior, note, patient had initial right hip fracture surgery at [facility] with a hip pin placement in .  She developed avascular necrosis, necessitating a right total hip replacement at [facility] in .  She was well for one year and had an accidental fall taking out her garbage . She was then diagnosed with a periprosthetic fracture. Repair consisted of lengthening the right total hip replacement and then stabilization with the distal femur.  At rehabilitation, she had a nontraumatic separation of this distal stabilization which required a periprosthetic revision. Patients/p revision of right hip surgery as of 18. Patient present with fever and unclrear etiology    MEDICATIONS  (STANDING):  aspirin enteric coated 81 milliGRAM(s) Oral daily  buDESOnide   0.5 milliGRAM(s) Respule 0.5 milliGRAM(s) Inhalation two times a day  carvedilol 12.5 milliGRAM(s) Oral every 12 hours  docusate sodium 100 milliGRAM(s) Oral two times a day  enoxaparin Injectable 40 milliGRAM(s) SubCutaneous daily  folic acid 1 milliGRAM(s) Oral daily  gabapentin 300 milliGRAM(s) Oral three times a day  hydrALAZINE 50 milliGRAM(s) Oral every 8 hours  nicotine - 21 mG/24Hr(s) Patch 1 patch Transdermal daily  NIFEdipine XL 90 milliGRAM(s) Oral daily  oxyCODONE  ER Tablet 20 milliGRAM(s) Oral every 12 hours  pantoprazole    Tablet 40 milliGRAM(s) Oral before breakfast  polyethylene glycol 3350 17 Gram(s) Oral daily  QUEtiapine 150 milliGRAM(s) Oral at bedtime  senna 2 Tablet(s) Oral at bedtime  thiamine 100 milliGRAM(s) Oral daily  traMADol 50 milliGRAM(s) Oral every 6 hours  vancomycin  IVPB 750 milliGRAM(s) IV Intermittent every 12 hours    MEDICATIONS  (PRN):  acetaminophen   Tablet 650 milliGRAM(s) Oral every 6 hours PRN For Temp greater than 38.5 C (101.3 F)  acetaminophen   Tablet. 650 milliGRAM(s) Oral every 6 hours PRN Mild Pain (1 - 3)  HYDROmorphone   Tablet 4 milliGRAM(s) Oral every 3 hours PRN Moderate Pain (4 - 6)  HYDROmorphone   Tablet 8 milliGRAM(s) Oral every 3 hours PRN Severe Pain (7 - 10)  QUEtiapine 50 milliGRAM(s) Oral every 6 hours PRN anxiety/insomnia          VITALS:   T(C): 37.3 (18 @ 20:01), Max: 38.1 (18 @ 05:57)  HR: 80 (18 @ 20:01) (62 - 88)  BP: 101/62 (18 @ 20:01) (94/55 - 138/66)  RR: 18 (18 @ 20:01) (16 - 18)  SpO2: 95% (18 @ 20:01) (91% - 98%)  Wt(kg): --    PHYSICAL EXAM:  GENERAL: NAD, well nourished and conversant  HEAD:  Atraumatic  EYES: EOM, PERRLA, conjunctiva pink and sclera white  ENT: No tonsillar erythema, exudates, or enlargement, moist mucous membranes, good dentition, no lesions  NECK: Supple, No JVD, normal thyroid, carotids with normal upstrokes and no bruits  CHEST/LUNG: soft rales at the left base  HEART: Regular rate and rhythm, No murmurs, rubs, or gallops  ABDOMEN: Soft, nondistended, no masses, guarding, tenderness or rebound, bowel sounds present  EXTREMITIES:  2+ Peripheral Pulses, No clubbing, cyanosis, or edema. (+) right periprosthetic frature site   LYMPH: No lymphadenopathy noted  SKIN: No rashes or lesions  NERVOUS SYSTEM:  Alert & Oriented X3, normal cognitive function. Motor Strength 5/5 right upper and right lower.  5/5 left upper and left lower extremities, DTRs 2+ intact and symmetric        LABS:        CBC Full  -  ( 23 Aug 2018 15:04 )  WBC Count : 9.5 K/uL  Hemoglobin : 10.4 g/dL  Hematocrit : 32.8 %  Platelet Count - Automated : 226 K/uL  Mean Cell Volume : 91.8 fl  Mean Cell Hemoglobin : 29.1 pg  Mean Cell Hemoglobin Concentration : 31.7 gm/dL  Auto Neutrophil # : x  Auto Lymphocyte # : x  Auto Monocyte # : x  Auto Eosinophil # : x  Auto Basophil # : x  Auto Neutrophil % : x  Auto Lymphocyte % : x  Auto Monocyte % : x  Auto Eosinophil % : x  Auto Basophil % : x    08-23    129<L>  |  92<L>  |  30<H>  ----------------------------<  127<H>  4.2   |  24  |  0.99    Ca    9.1      23 Aug 2018 15:06          Urinalysis Basic - ( 22 Aug 2018 10:15 )    Color: Yellow / Appearance: Slightly Turbid / S.013 / pH: x  Gluc: x / Ketone: Negative  / Bili: Negative / Urobili: 1.0 mg/dL   Blood: x / Protein: 100 mg/dL / Nitrite: Negative   Leuk Esterase: Moderate / RBC: 136 /HPF / WBC 31 /HPF   Sq Epi: x / Non Sq Epi: 0 /HPF / Bacteria: Negative      CAPILLARY BLOOD GLUCOSE          RADIOLOGY & ADDITIONAL TESTS:

## 2018-08-24 LAB
-  AMPICILLIN/SULBACTAM: SIGNIFICANT CHANGE UP
-  CEFAZOLIN: SIGNIFICANT CHANGE UP
-  CLINDAMYCIN: SIGNIFICANT CHANGE UP
-  DAPTOMYCIN: SIGNIFICANT CHANGE UP
-  ERYTHROMYCIN: SIGNIFICANT CHANGE UP
-  GENTAMICIN: SIGNIFICANT CHANGE UP
-  LINEZOLID: SIGNIFICANT CHANGE UP
-  OXACILLIN: SIGNIFICANT CHANGE UP
-  PENICILLIN: SIGNIFICANT CHANGE UP
-  RIFAMPIN: SIGNIFICANT CHANGE UP
-  TETRACYCLINE: SIGNIFICANT CHANGE UP
-  TRIMETHOPRIM/SULFAMETHOXAZOLE: SIGNIFICANT CHANGE UP
-  VANCOMYCIN: SIGNIFICANT CHANGE UP
ANION GAP SERPL CALC-SCNC: 13 MMOL/L — SIGNIFICANT CHANGE UP (ref 5–17)
BUN SERPL-MCNC: 36 MG/DL — HIGH (ref 7–23)
CALCIUM SERPL-MCNC: 8.8 MG/DL — SIGNIFICANT CHANGE UP (ref 8.4–10.5)
CHLORIDE SERPL-SCNC: 96 MMOL/L — SIGNIFICANT CHANGE UP (ref 96–108)
CO2 SERPL-SCNC: 23 MMOL/L — SIGNIFICANT CHANGE UP (ref 22–31)
CREAT SERPL-MCNC: 0.99 MG/DL — SIGNIFICANT CHANGE UP (ref 0.5–1.3)
CULTURE RESULTS: SIGNIFICANT CHANGE UP
GLUCOSE SERPL-MCNC: 108 MG/DL — HIGH (ref 70–99)
GRAM STN FLD: SIGNIFICANT CHANGE UP
HCT VFR BLD CALC: 30.5 % — LOW (ref 34.5–45)
HGB BLD-MCNC: 9.3 G/DL — LOW (ref 11.5–15.5)
MCHC RBC-ENTMCNC: 28.1 PG — SIGNIFICANT CHANGE UP (ref 27–34)
MCHC RBC-ENTMCNC: 30.5 GM/DL — LOW (ref 32–36)
MCV RBC AUTO: 92.1 FL — SIGNIFICANT CHANGE UP (ref 80–100)
METHOD TYPE: SIGNIFICANT CHANGE UP
ORGANISM # SPEC MICROSCOPIC CNT: SIGNIFICANT CHANGE UP
PLATELET # BLD AUTO: 219 K/UL — SIGNIFICANT CHANGE UP (ref 150–400)
POTASSIUM SERPL-MCNC: 4.1 MMOL/L — SIGNIFICANT CHANGE UP (ref 3.5–5.3)
POTASSIUM SERPL-SCNC: 4.1 MMOL/L — SIGNIFICANT CHANGE UP (ref 3.5–5.3)
RBC # BLD: 3.31 M/UL — LOW (ref 3.8–5.2)
RBC # FLD: 15.7 % — HIGH (ref 10.3–14.5)
SODIUM SERPL-SCNC: 132 MMOL/L — LOW (ref 135–145)
SPECIMEN SOURCE: SIGNIFICANT CHANGE UP
WBC # BLD: 8.52 K/UL — SIGNIFICANT CHANGE UP (ref 3.8–10.5)
WBC # FLD AUTO: 8.52 K/UL — SIGNIFICANT CHANGE UP (ref 3.8–10.5)

## 2018-08-24 PROCEDURE — 76882 US LMTD JT/FCL EVL NVASC XTR: CPT | Mod: 26,RT

## 2018-08-24 PROCEDURE — 73718 MRI LOWER EXTREMITY W/O DYE: CPT | Mod: 26,RT

## 2018-08-24 PROCEDURE — 99233 SBSQ HOSP IP/OBS HIGH 50: CPT

## 2018-08-24 PROCEDURE — 73721 MRI JNT OF LWR EXTRE W/O DYE: CPT | Mod: 26,RT

## 2018-08-24 RX ADMIN — TRAMADOL HYDROCHLORIDE 50 MILLIGRAM(S): 50 TABLET ORAL at 06:21

## 2018-08-24 RX ADMIN — POLYETHYLENE GLYCOL 3350 17 GRAM(S): 17 POWDER, FOR SOLUTION ORAL at 12:16

## 2018-08-24 RX ADMIN — TRAMADOL HYDROCHLORIDE 50 MILLIGRAM(S): 50 TABLET ORAL at 23:34

## 2018-08-24 RX ADMIN — TRAMADOL HYDROCHLORIDE 50 MILLIGRAM(S): 50 TABLET ORAL at 01:30

## 2018-08-24 RX ADMIN — Medication 50 MILLIGRAM(S): at 14:58

## 2018-08-24 RX ADMIN — Medication 81 MILLIGRAM(S): at 12:17

## 2018-08-24 RX ADMIN — GABAPENTIN 300 MILLIGRAM(S): 400 CAPSULE ORAL at 06:21

## 2018-08-24 RX ADMIN — Medication 1 PATCH: at 12:19

## 2018-08-24 RX ADMIN — GABAPENTIN 300 MILLIGRAM(S): 400 CAPSULE ORAL at 14:57

## 2018-08-24 RX ADMIN — Medication 250 MILLIGRAM(S): at 06:22

## 2018-08-24 RX ADMIN — Medication 1 PATCH: at 12:18

## 2018-08-24 RX ADMIN — QUETIAPINE FUMARATE 50 MILLIGRAM(S): 200 TABLET, FILM COATED ORAL at 17:08

## 2018-08-24 RX ADMIN — OXYCODONE HYDROCHLORIDE 20 MILLIGRAM(S): 5 TABLET ORAL at 06:21

## 2018-08-24 RX ADMIN — Medication 1 MILLIGRAM(S): at 12:17

## 2018-08-24 RX ADMIN — Medication 100 MILLIGRAM(S): at 06:21

## 2018-08-24 RX ADMIN — CARVEDILOL PHOSPHATE 12.5 MILLIGRAM(S): 80 CAPSULE, EXTENDED RELEASE ORAL at 17:05

## 2018-08-24 RX ADMIN — TRAMADOL HYDROCHLORIDE 50 MILLIGRAM(S): 50 TABLET ORAL at 12:57

## 2018-08-24 RX ADMIN — OXYCODONE HYDROCHLORIDE 20 MILLIGRAM(S): 5 TABLET ORAL at 06:51

## 2018-08-24 RX ADMIN — Medication 100 MILLIGRAM(S): at 17:07

## 2018-08-24 RX ADMIN — TRAMADOL HYDROCHLORIDE 50 MILLIGRAM(S): 50 TABLET ORAL at 18:53

## 2018-08-24 RX ADMIN — HYDROMORPHONE HYDROCHLORIDE 8 MILLIGRAM(S): 2 INJECTION INTRAMUSCULAR; INTRAVENOUS; SUBCUTANEOUS at 09:35

## 2018-08-24 RX ADMIN — TRAMADOL HYDROCHLORIDE 50 MILLIGRAM(S): 50 TABLET ORAL at 06:51

## 2018-08-24 RX ADMIN — Medication 50 MILLIGRAM(S): at 06:21

## 2018-08-24 RX ADMIN — TRAMADOL HYDROCHLORIDE 50 MILLIGRAM(S): 50 TABLET ORAL at 00:52

## 2018-08-24 RX ADMIN — PANTOPRAZOLE SODIUM 40 MILLIGRAM(S): 20 TABLET, DELAYED RELEASE ORAL at 06:21

## 2018-08-24 RX ADMIN — OXYCODONE HYDROCHLORIDE 20 MILLIGRAM(S): 5 TABLET ORAL at 18:53

## 2018-08-24 RX ADMIN — ENOXAPARIN SODIUM 40 MILLIGRAM(S): 100 INJECTION SUBCUTANEOUS at 12:17

## 2018-08-24 RX ADMIN — TRAMADOL HYDROCHLORIDE 50 MILLIGRAM(S): 50 TABLET ORAL at 12:27

## 2018-08-24 RX ADMIN — TRAMADOL HYDROCHLORIDE 50 MILLIGRAM(S): 50 TABLET ORAL at 18:23

## 2018-08-24 RX ADMIN — SENNA PLUS 2 TABLET(S): 8.6 TABLET ORAL at 22:15

## 2018-08-24 RX ADMIN — HYDROMORPHONE HYDROCHLORIDE 8 MILLIGRAM(S): 2 INJECTION INTRAMUSCULAR; INTRAVENOUS; SUBCUTANEOUS at 12:27

## 2018-08-24 RX ADMIN — Medication 90 MILLIGRAM(S): at 06:21

## 2018-08-24 RX ADMIN — HYDROMORPHONE HYDROCHLORIDE 8 MILLIGRAM(S): 2 INJECTION INTRAMUSCULAR; INTRAVENOUS; SUBCUTANEOUS at 17:05

## 2018-08-24 RX ADMIN — QUETIAPINE FUMARATE 50 MILLIGRAM(S): 200 TABLET, FILM COATED ORAL at 08:05

## 2018-08-24 RX ADMIN — Medication 100 MILLIGRAM(S): at 12:17

## 2018-08-24 RX ADMIN — Medication 250 MILLIGRAM(S): at 18:23

## 2018-08-24 RX ADMIN — GABAPENTIN 300 MILLIGRAM(S): 400 CAPSULE ORAL at 22:15

## 2018-08-24 RX ADMIN — HYDROMORPHONE HYDROCHLORIDE 8 MILLIGRAM(S): 2 INJECTION INTRAMUSCULAR; INTRAVENOUS; SUBCUTANEOUS at 17:35

## 2018-08-24 RX ADMIN — HYDROMORPHONE HYDROCHLORIDE 8 MILLIGRAM(S): 2 INJECTION INTRAMUSCULAR; INTRAVENOUS; SUBCUTANEOUS at 12:57

## 2018-08-24 RX ADMIN — QUETIAPINE FUMARATE 150 MILLIGRAM(S): 200 TABLET, FILM COATED ORAL at 22:15

## 2018-08-24 RX ADMIN — OXYCODONE HYDROCHLORIDE 20 MILLIGRAM(S): 5 TABLET ORAL at 18:22

## 2018-08-24 RX ADMIN — CARVEDILOL PHOSPHATE 12.5 MILLIGRAM(S): 80 CAPSULE, EXTENDED RELEASE ORAL at 06:21

## 2018-08-24 RX ADMIN — HYDROMORPHONE HYDROCHLORIDE 8 MILLIGRAM(S): 2 INJECTION INTRAMUSCULAR; INTRAVENOUS; SUBCUTANEOUS at 08:05

## 2018-08-24 NOTE — PROGRESS NOTE ADULT - SUBJECTIVE AND OBJECTIVE BOX
71 yo F, w/ PMH of emphysema, CAD, HTN, revision USAMA with ORIF for periprosthetic R femur fracture on 6/30/18 by Dr. Be, discharged on 7/3/18 to [facility] rehab, readmitted from 07/16 - 07/24/18 w/ worsening R hip pain and decreased ability to ambulate, now s/p right femur vancouver B1 periprosthetic fracture ORIF by Dr. Cornell on 07/19/18, BIBEMS s/p mechanical fall on stairs during the night c/o R hip and R leg pain, redness, swelling, stiffness. Patient denies other complaints. Denies headache, neck stiffness, fever/chills, vision change, chest pain, shortness of breath, difficulty breathing, palpitations, weakness, dizziness, nausea, vomiting, diarrhea, syncope.   From prior, note, patient had initial right hip fracture surgery at [facility] with a hip pin placement in 2015.  She developed avascular necrosis, necessitating a right total hip replacement at [facility] in 2017.  She was well for one year and had an accidental fall taking out her garbage June, 2018. She was then diagnosed with a periprosthetic fracture. Repair consisted of lengthening the right total hip replacement and then stabilization with the distal femur.  At rehabilitation, she had a nontraumatic separation of this distal stabilization which required a periprosthetic revision. Patients/p revision of right hip surgery as of 07/19/18. Patient present with fever and unclrear etiology.  She presented to the ED on 8/20 with R hip and thigh pain, swelling, stiffness, and redness in the setting of a mechanical fall on the stairs.  Then in the hospital spiked to 102.9, blood cx with MRSA  LE CT with lucency at the acetabular screw and prox femur, fluid collection in lateral soft tissue of thigh, an other fluid collection next to vastus intermedius.  Will need biopsy or drainage of the fluid collection.   Cultures now show MRSA in Blood  and Urine. Needed procedure to deterine if there is  MRSA IN THE FLUID COLLECTION IN THE LEG.  IR and ortho planned to get guided aspiration of fluid  but patient refused.    MEDICATIONS  (STANDING):  aspirin enteric coated 81 milliGRAM(s) Oral daily  buDESOnide   0.5 milliGRAM(s) Respule 0.5 milliGRAM(s) Inhalation two times a day  carvedilol 12.5 milliGRAM(s) Oral every 12 hours  docusate sodium 100 milliGRAM(s) Oral two times a day  enoxaparin Injectable 40 milliGRAM(s) SubCutaneous daily  folic acid 1 milliGRAM(s) Oral daily  gabapentin 300 milliGRAM(s) Oral three times a day  hydrALAZINE 50 milliGRAM(s) Oral every 8 hours  nicotine - 21 mG/24Hr(s) Patch 1 patch Transdermal daily  NIFEdipine XL 90 milliGRAM(s) Oral daily  oxyCODONE  ER Tablet 20 milliGRAM(s) Oral every 12 hours  pantoprazole    Tablet 40 milliGRAM(s) Oral before breakfast  polyethylene glycol 3350 17 Gram(s) Oral daily  QUEtiapine 150 milliGRAM(s) Oral at bedtime  senna 2 Tablet(s) Oral at bedtime  thiamine 100 milliGRAM(s) Oral daily  traMADol 50 milliGRAM(s) Oral every 6 hours  vancomycin  IVPB 750 milliGRAM(s) IV Intermittent every 12 hours    MEDICATIONS  (PRN):  acetaminophen   Tablet 650 milliGRAM(s) Oral every 6 hours PRN For Temp greater than 38.5 C (101.3 F)  acetaminophen   Tablet. 650 milliGRAM(s) Oral every 6 hours PRN Mild Pain (1 - 3)  HYDROmorphone   Tablet 4 milliGRAM(s) Oral every 3 hours PRN Moderate Pain (4 - 6)  HYDROmorphone   Tablet 8 milliGRAM(s) Oral every 3 hours PRN Severe Pain (7 - 10)  QUEtiapine 50 milliGRAM(s) Oral every 6 hours PRN anxiety/insomnia        Vital Signs Last 24 Hrs  T(C): 36.5 (24 Aug 2018 14:30), Max: 37.3 (23 Aug 2018 20:01)  T(F): 97.7 (24 Aug 2018 14:30), Max: 99.2 (24 Aug 2018 00:58)  HR: 77 (24 Aug 2018 15:13) (69 - 87)  BP: 111/64 (24 Aug 2018 15:13) (101/62 - 127/63)  BP(mean): --  RR: 18 (24 Aug 2018 14:30) (18 - 18)  SpO2: 94% (24 Aug 2018 15:13) (93% - 96%)    PHYSICAL EXAM:  GENERAL: NAD, well nourished and conversant  HEAD:  Atraumatic  EYES: EOM, PERRLA, conjunctiva pink and sclera white  ENT: No tonsillar erythema, exudates, or enlargement, moist mucous membranes, good dentition, no lesions  NECK: Supple, No JVD, normal thyroid, carotids with normal upstrokes and no bruits  CHEST/LUNG: soft rales at the left base  HEART: Regular rate and rhythm, No murmurs, rubs, or gallops  ABDOMEN: Soft, nondistended, no masses, guarding, tenderness or rebound, bowel sounds present  EXTREMITIES:  2+ Peripheral Pulses, No clubbing, cyanosis, or edema. (+) right periprosthetic fracture site draiign  LYMPH: No lymphadenopathy noted  SKIN: No rashes or lesions  NERVOUS SYSTEM:  Alert & Oriented X3, normal cognitive function. Motor Strength 5/5 right upper and right lower.  5/5 left upper and left lower extremities, DTRs 2+ intact and symmetric        LABS:        CBC Full  -  ( 24 Aug 2018 07:22 )  WBC Count : 8.52 K/uL  Hemoglobin : 9.3 g/dL  Hematocrit : 30.5 %  Platelet Count - Automated : 219 K/uL  Mean Cell Volume : 92.1 fl  Mean Cell Hemoglobin : 28.1 pg  Mean Cell Hemoglobin Concentration : 30.5 gm/dL  Auto Neutrophil # : x  Auto Lymphocyte # : x  Auto Monocyte # : x  Auto Eosinophil # : x  Auto Basophil # : x  Auto Neutrophil % : x  Auto Lymphocyte % : x  Auto Monocyte % : x  Auto Eosinophil % : x  Auto Basophil % : x    08-24    132<L>  |  96  |  36<H>  ----------------------------<  108<H>  4.1   |  23  |  0.99    Ca    8.8      24 Aug 2018 06:49                CAPILLARY BLOOD GLUCOSE          RADIOLOGY & ADDITIONAL TESTS:

## 2018-08-24 NOTE — PROGRESS NOTE ADULT - ASSESSMENT
Patient returns with right leg pain now found to have fever .  (+) MRSA bacteremia and (+)  urine cultures a well .  Patient draining from her per-prosthetic fracture site and IR aspiration of fluid collection was scheduled however patient refused and leg is OOzing.  Ortho not planning to do I&D or removal of  hardware. They only want IR drainage.

## 2018-08-24 NOTE — PROGRESS NOTE ADULT - ASSESSMENT
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 THR, kika-prosthetic R femur fx (s/p Total Hip revision with ORIF, 6/30/18), recent re-admission (07/16 - 07/24) for worsening R hip pain and decreased ability to ambulate now s/p R femur vancouver B1 periprosthetic fracture ORIF (07/19) who presented to the ED on 8/20 with R hip and thigh pain, swelling, stiffness, and redness in the setting of a mechanical fall on the stairs  in the hospital spiked to 102.9, blood cx with MRSA  LE CT with lucency at the acetabular screw and prox fem, fluid collection in lateral soft tissue of thigh, an other fluid collection next to vastus intermedius    high fever, MRSA bacteremia with hip hardware collection in view of complicated hip surgical history and recent ORIF    *  f/u the repeat blood cx x 2  * start vanco 750 q 12  * check the trough tomorrow  * TTE  * agree with MRI  * IR eval for abscess drainage now but ultimately the hardware should be removed  * ortho f/u   * psych eval

## 2018-08-24 NOTE — PROGRESS NOTE ADULT - SUBJECTIVE AND OBJECTIVE BOX
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    Interval History: pt stable and afebrile but the blood and urine cx all have MRSA    ROS:      All other systems negative    Constitutional: no fever, no chills  Head: no trauma  Eyes: no vision changes, no eye pain  ENT:  no sore throat, no rhinorrhea  Cardiovascular:  no chest pain, no palpitation  Respiratory:  no SOB, no cough  GI:  no abd pain, no vomiting, no diarrhea  urinary: no dysuria, no hematuria, no flank pain  musculoskeletal:  right thigh and hip pain with edema, erythema  skin:  no rash  neurology:  no headache, no seizure, no change in mental status  psych: no anxiety, no depression         Allergies  No Known Allergies        ANTIMICROBIALS:  vancomycin  IVPB 750 every 12 hours      OTHER MEDS:  acetaminophen   Tablet 650 milliGRAM(s) Oral every 6 hours PRN  acetaminophen   Tablet. 650 milliGRAM(s) Oral every 6 hours PRN  aspirin enteric coated 81 milliGRAM(s) Oral daily  buDESOnide   0.5 milliGRAM(s) Respule 0.5 milliGRAM(s) Inhalation two times a day  carvedilol 12.5 milliGRAM(s) Oral every 12 hours  docusate sodium 100 milliGRAM(s) Oral two times a day  enoxaparin Injectable 40 milliGRAM(s) SubCutaneous daily  folic acid 1 milliGRAM(s) Oral daily  gabapentin 300 milliGRAM(s) Oral three times a day  hydrALAZINE 50 milliGRAM(s) Oral every 8 hours  HYDROmorphone   Tablet 4 milliGRAM(s) Oral every 3 hours PRN  HYDROmorphone   Tablet 8 milliGRAM(s) Oral every 3 hours PRN  nicotine - 21 mG/24Hr(s) Patch 1 patch Transdermal daily  NIFEdipine XL 90 milliGRAM(s) Oral daily  oxyCODONE  ER Tablet 20 milliGRAM(s) Oral every 12 hours  pantoprazole    Tablet 40 milliGRAM(s) Oral before breakfast  polyethylene glycol 3350 17 Gram(s) Oral daily  QUEtiapine 50 milliGRAM(s) Oral every 6 hours PRN  QUEtiapine 150 milliGRAM(s) Oral at bedtime  senna 2 Tablet(s) Oral at bedtime  thiamine 100 milliGRAM(s) Oral daily  traMADol 50 milliGRAM(s) Oral every 6 hours      Vital Signs Last 24 Hrs  T(C): 37.1 (24 Aug 2018 06:16), Max: 37.3 (23 Aug 2018 20:01)  T(F): 98.8 (24 Aug 2018 06:16), Max: 99.2 (24 Aug 2018 00:58)  HR: 87 (24 Aug 2018 06:16) (75 - 88)  BP: 127/63 (24 Aug 2018 06:16) (94/55 - 128/70)  BP(mean): --  RR: 18 (24 Aug 2018 06:16) (18 - 18)  SpO2: 93% (24 Aug 2018 06:16) (93% - 97%)    Physical Exam:  General:    NAD,  non toxic, A&O x 3  Head: atraumatic, normocephalic  Eye: normal sclera and conjunctiva  ENT:    no oropharyngeal lesions,   no LAD,   neck supple  Cardio:     regular S1, S2,  no murmur  Respiratory:    clear b/l,    no wheezing  abd:     soft,   BS +,   no tenderness,    no organomegaly  :   no CVAT,  no suprapubic tenderness,   no  edmondson  Musculoskeletal:   R thigh erythema, edema and warmth around the incision, some tenderness  vascular: no lines, normal pulses  Skin:    no rash  Neurologic:     no focal deficit  psych: pt paranoid                          9.3    8.52  )-----------( 219      ( 24 Aug 2018 07:22 )             30.5       08    132<L>  |  96  |  36<H>  ----------------------------<  108<H>  4.1   |  23  |  0.99    Ca    8.8      24 Aug 2018 06:49        Urinalysis Basic - ( 22 Aug 2018 10:15 )    Color: Yellow / Appearance: Slightly Turbid / S.013 / pH: x  Gluc: x / Ketone: Negative  / Bili: Negative / Urobili: 1.0 mg/dL   Blood: x / Protein: 100 mg/dL / Nitrite: Negative   Leuk Esterase: Moderate / RBC: 136 /HPF / WBC 31 /HPF   Sq Epi: x / Non Sq Epi: 0 /HPF / Bacteria: Negative        MICROBIOLOGY:  v  .Urine Clean Catch (Midstream)  18   50,000 - 99,000 CFU/mL Staphylococcus aureus  --  --      .Blood Blood  18   Growth in aerobic bottle: Gram Positive Cocci in Clusters  --    Growth in aerobic bottle: Gram Positive Cocci in Clusters      .Blood Blood  18   Growth in aerobic bottle: Staphylococcus aureus  "  .Urine Catheterized  18   No growth  --  --                RADIOLOGY:  Images below reviewed personally  < from: CT 3D Reconstruct w/ Workstation (18 @ 12:35) >  IMPRESSION: Limited study by beam hardening artifact from the hardware.    1.  Status post right total hip revision arthroplasty with cerclage wires   and lateral plate and screw fixation in attempt of fixation of mid and   proximal femoral periprosthetic fractures. No acute fracture. Malunion of   the chronic fracture fragments. Mild lucency about the acetabular screw   suggesting early loosening. Scalp lucency at the proximal anterior   femoral component appears chronic suggesting osteolysis versus post   surgical change.  2.  Soft tissue swelling and fluid collection within the lateral soft   tissues of the thigh. Differential includes hematoma, seroma, and Martinez   Chari lesion. Superimposed infection is not excluded.  3.  Suspected edema/fluid anterior to the femur adjacent to the vastus   intermedius and may represent seroma, hematoma, adverse reaction to   metal, and infection.  4.  Degenerative changes of the knee.

## 2018-08-25 LAB
-  AMPICILLIN/SULBACTAM: SIGNIFICANT CHANGE UP
-  CEFAZOLIN: SIGNIFICANT CHANGE UP
-  DAPTOMYCIN: SIGNIFICANT CHANGE UP
-  GENTAMICIN: SIGNIFICANT CHANGE UP
-  LINEZOLID: SIGNIFICANT CHANGE UP
-  OXACILLIN: SIGNIFICANT CHANGE UP
-  PENICILLIN: SIGNIFICANT CHANGE UP
-  RIFAMPIN: SIGNIFICANT CHANGE UP
-  TETRACYCLINE: SIGNIFICANT CHANGE UP
-  TRIMETHOPRIM/SULFAMETHOXAZOLE: SIGNIFICANT CHANGE UP
-  VANCOMYCIN: SIGNIFICANT CHANGE UP
ANION GAP SERPL CALC-SCNC: 11 MMOL/L — SIGNIFICANT CHANGE UP (ref 5–17)
BUN SERPL-MCNC: 47 MG/DL — HIGH (ref 7–23)
CALCIUM SERPL-MCNC: 8.9 MG/DL — SIGNIFICANT CHANGE UP (ref 8.4–10.5)
CHLORIDE SERPL-SCNC: 95 MMOL/L — LOW (ref 96–108)
CO2 SERPL-SCNC: 22 MMOL/L — SIGNIFICANT CHANGE UP (ref 22–31)
CREAT SERPL-MCNC: 1.05 MG/DL — SIGNIFICANT CHANGE UP (ref 0.5–1.3)
CULTURE RESULTS: SIGNIFICANT CHANGE UP
CULTURE RESULTS: SIGNIFICANT CHANGE UP
GLUCOSE SERPL-MCNC: 169 MG/DL — HIGH (ref 70–99)
HCT VFR BLD CALC: 30.1 % — LOW (ref 34.5–45)
HGB BLD-MCNC: 9.5 G/DL — LOW (ref 11.5–15.5)
MCHC RBC-ENTMCNC: 28.4 PG — SIGNIFICANT CHANGE UP (ref 27–34)
MCHC RBC-ENTMCNC: 31.6 GM/DL — LOW (ref 32–36)
MCV RBC AUTO: 90.1 FL — SIGNIFICANT CHANGE UP (ref 80–100)
METHOD TYPE: SIGNIFICANT CHANGE UP
ORGANISM # SPEC MICROSCOPIC CNT: SIGNIFICANT CHANGE UP
ORGANISM # SPEC MICROSCOPIC CNT: SIGNIFICANT CHANGE UP
PLATELET # BLD AUTO: 249 K/UL — SIGNIFICANT CHANGE UP (ref 150–400)
POTASSIUM SERPL-MCNC: 4.2 MMOL/L — SIGNIFICANT CHANGE UP (ref 3.5–5.3)
POTASSIUM SERPL-SCNC: 4.2 MMOL/L — SIGNIFICANT CHANGE UP (ref 3.5–5.3)
RBC # BLD: 3.34 M/UL — LOW (ref 3.8–5.2)
RBC # FLD: 15.8 % — HIGH (ref 10.3–14.5)
SODIUM SERPL-SCNC: 128 MMOL/L — LOW (ref 135–145)
SPECIMEN SOURCE: SIGNIFICANT CHANGE UP
SPECIMEN SOURCE: SIGNIFICANT CHANGE UP
VANCOMYCIN TROUGH SERPL-MCNC: 9.9 UG/ML — LOW (ref 10–20)
WBC # BLD: 9.48 K/UL — SIGNIFICANT CHANGE UP (ref 3.8–10.5)
WBC # FLD AUTO: 9.48 K/UL — SIGNIFICANT CHANGE UP (ref 3.8–10.5)

## 2018-08-25 PROCEDURE — 99233 SBSQ HOSP IP/OBS HIGH 50: CPT

## 2018-08-25 RX ORDER — VANCOMYCIN HCL 1 G
1000 VIAL (EA) INTRAVENOUS EVERY 12 HOURS
Qty: 0 | Refills: 0 | Status: DISCONTINUED | OUTPATIENT
Start: 2018-08-25 | End: 2018-09-03

## 2018-08-25 RX ADMIN — TRAMADOL HYDROCHLORIDE 50 MILLIGRAM(S): 50 TABLET ORAL at 18:36

## 2018-08-25 RX ADMIN — GABAPENTIN 300 MILLIGRAM(S): 400 CAPSULE ORAL at 22:18

## 2018-08-25 RX ADMIN — Medication 1 PATCH: at 12:01

## 2018-08-25 RX ADMIN — POLYETHYLENE GLYCOL 3350 17 GRAM(S): 17 POWDER, FOR SOLUTION ORAL at 11:52

## 2018-08-25 RX ADMIN — OXYCODONE HYDROCHLORIDE 20 MILLIGRAM(S): 5 TABLET ORAL at 06:17

## 2018-08-25 RX ADMIN — ENOXAPARIN SODIUM 40 MILLIGRAM(S): 100 INJECTION SUBCUTANEOUS at 11:51

## 2018-08-25 RX ADMIN — HYDROMORPHONE HYDROCHLORIDE 8 MILLIGRAM(S): 2 INJECTION INTRAMUSCULAR; INTRAVENOUS; SUBCUTANEOUS at 21:29

## 2018-08-25 RX ADMIN — HYDROMORPHONE HYDROCHLORIDE 8 MILLIGRAM(S): 2 INJECTION INTRAMUSCULAR; INTRAVENOUS; SUBCUTANEOUS at 20:59

## 2018-08-25 RX ADMIN — Medication 1 MILLIGRAM(S): at 11:52

## 2018-08-25 RX ADMIN — GABAPENTIN 300 MILLIGRAM(S): 400 CAPSULE ORAL at 14:47

## 2018-08-25 RX ADMIN — TRAMADOL HYDROCHLORIDE 50 MILLIGRAM(S): 50 TABLET ORAL at 13:00

## 2018-08-25 RX ADMIN — Medication 650 MILLIGRAM(S): at 15:54

## 2018-08-25 RX ADMIN — HYDROMORPHONE HYDROCHLORIDE 8 MILLIGRAM(S): 2 INJECTION INTRAMUSCULAR; INTRAVENOUS; SUBCUTANEOUS at 06:24

## 2018-08-25 RX ADMIN — GABAPENTIN 300 MILLIGRAM(S): 400 CAPSULE ORAL at 06:29

## 2018-08-25 RX ADMIN — HYDROMORPHONE HYDROCHLORIDE 8 MILLIGRAM(S): 2 INJECTION INTRAMUSCULAR; INTRAVENOUS; SUBCUTANEOUS at 10:09

## 2018-08-25 RX ADMIN — Medication 1 PATCH: at 11:53

## 2018-08-25 RX ADMIN — TRAMADOL HYDROCHLORIDE 50 MILLIGRAM(S): 50 TABLET ORAL at 12:28

## 2018-08-25 RX ADMIN — Medication 250 MILLIGRAM(S): at 12:28

## 2018-08-25 RX ADMIN — Medication 650 MILLIGRAM(S): at 16:24

## 2018-08-25 RX ADMIN — OXYCODONE HYDROCHLORIDE 20 MILLIGRAM(S): 5 TABLET ORAL at 18:06

## 2018-08-25 RX ADMIN — Medication 250 MILLIGRAM(S): at 22:18

## 2018-08-25 RX ADMIN — Medication 100 MILLIGRAM(S): at 11:53

## 2018-08-25 RX ADMIN — HYDROMORPHONE HYDROCHLORIDE 8 MILLIGRAM(S): 2 INJECTION INTRAMUSCULAR; INTRAVENOUS; SUBCUTANEOUS at 14:46

## 2018-08-25 RX ADMIN — OXYCODONE HYDROCHLORIDE 20 MILLIGRAM(S): 5 TABLET ORAL at 07:05

## 2018-08-25 RX ADMIN — TRAMADOL HYDROCHLORIDE 50 MILLIGRAM(S): 50 TABLET ORAL at 18:06

## 2018-08-25 RX ADMIN — HYDROMORPHONE HYDROCHLORIDE 8 MILLIGRAM(S): 2 INJECTION INTRAMUSCULAR; INTRAVENOUS; SUBCUTANEOUS at 09:39

## 2018-08-25 RX ADMIN — QUETIAPINE FUMARATE 150 MILLIGRAM(S): 200 TABLET, FILM COATED ORAL at 21:13

## 2018-08-25 RX ADMIN — TRAMADOL HYDROCHLORIDE 50 MILLIGRAM(S): 50 TABLET ORAL at 00:30

## 2018-08-25 RX ADMIN — HYDROMORPHONE HYDROCHLORIDE 8 MILLIGRAM(S): 2 INJECTION INTRAMUSCULAR; INTRAVENOUS; SUBCUTANEOUS at 15:16

## 2018-08-25 RX ADMIN — HYDROMORPHONE HYDROCHLORIDE 8 MILLIGRAM(S): 2 INJECTION INTRAMUSCULAR; INTRAVENOUS; SUBCUTANEOUS at 07:54

## 2018-08-25 RX ADMIN — QUETIAPINE FUMARATE 50 MILLIGRAM(S): 200 TABLET, FILM COATED ORAL at 11:51

## 2018-08-25 RX ADMIN — OXYCODONE HYDROCHLORIDE 20 MILLIGRAM(S): 5 TABLET ORAL at 18:36

## 2018-08-25 RX ADMIN — Medication 81 MILLIGRAM(S): at 11:51

## 2018-08-25 NOTE — PROGRESS NOTE ADULT - SUBJECTIVE AND OBJECTIVE BOX
71 yo F, w/ PMH of emphysema, CAD, HTN, revision USAMA with ORIF for periprosthetic R femur fracture on 6/30/18 by Dr. Be, discharged on 7/3/18 to [facility] rehab, readmitted from 07/16 - 07/24/18 w/ worsening R hip pain and decreased ability to ambulate, now s/p right femur vancouver B1 periprosthetic fracture ORIF by Dr. Cornell on 07/19/18, BIBEMS s/p mechanical fall on stairs during the night c/o R hip and R leg pain, redness, swelling, stiffness. Patient denies other complaints. Denies headache, neck stiffness, fever/chills, vision change, chest pain, shortness of breath, difficulty breathing, palpitations, weakness, dizziness, nausea, vomiting, diarrhea, syncope.   From prior, note, patient had initial right hip fracture surgery at [facility] with a hip pin placement in 2015.  She developed avascular necrosis, necessitating a right total hip replacement at [facility] in 2017.  She was well for one year and had an accidental fall taking out her garbage June, 2018. She was then diagnosed with a periprosthetic fracture. Repair consisted of lengthening the right total hip replacement and then stabilization with the distal femur.  At rehabilitation, she had a nontraumatic separation of this distal stabilization which required a periprosthetic revision. Patients/p revision of right hip surgery as of 07/19/18. Patient present with fever and unclrear etiology.  She presented to the ED on 8/20 with R hip and thigh pain, swelling, stiffness, and redness in the setting of a mechanical fall on the stairs.  Then in the hospital spiked to 102.9, blood cx with MRSA  LE CT with lucency at the acetabular screw and prox femur, fluid collection in lateral soft tissue of thigh, an other fluid collection next to vastus intermedius.  Will need biopsy or drainage of the fluid collection.   Cultures now show MRSA in Blood  and Urine. Needed procedure to deterine if there is  MRSA IN THE FLUID COLLECTION IN THE LEG.  IR and ortho planned to get guided aspiration of fluid  but patient refused.    MEDICATIONS  (STANDING):  aspirin enteric coated 81 milliGRAM(s) Oral daily  buDESOnide   0.5 milliGRAM(s) Respule 0.5 milliGRAM(s) Inhalation two times a day  carvedilol 12.5 milliGRAM(s) Oral every 12 hours  docusate sodium 100 milliGRAM(s) Oral two times a day  enoxaparin Injectable 40 milliGRAM(s) SubCutaneous daily  folic acid 1 milliGRAM(s) Oral daily  gabapentin 300 milliGRAM(s) Oral three times a day  hydrALAZINE 50 milliGRAM(s) Oral every 8 hours  nicotine - 21 mG/24Hr(s) Patch 1 patch Transdermal daily  NIFEdipine XL 90 milliGRAM(s) Oral daily  oxyCODONE  ER Tablet 20 milliGRAM(s) Oral every 12 hours  pantoprazole    Tablet 40 milliGRAM(s) Oral before breakfast  polyethylene glycol 3350 17 Gram(s) Oral daily  QUEtiapine 150 milliGRAM(s) Oral at bedtime  senna 2 Tablet(s) Oral at bedtime  thiamine 100 milliGRAM(s) Oral daily  traMADol 50 milliGRAM(s) Oral every 6 hours  vancomycin  IVPB 1000 milliGRAM(s) IV Intermittent every 12 hours    MEDICATIONS  (PRN):  acetaminophen   Tablet 650 milliGRAM(s) Oral every 6 hours PRN For Temp greater than 38.5 C (101.3 F)  acetaminophen   Tablet. 650 milliGRAM(s) Oral every 6 hours PRN Mild Pain (1 - 3)  HYDROmorphone   Tablet 4 milliGRAM(s) Oral every 3 hours PRN Moderate Pain (4 - 6)  HYDROmorphone   Tablet 8 milliGRAM(s) Oral every 3 hours PRN Severe Pain (7 - 10)  QUEtiapine 50 milliGRAM(s) Oral every 6 hours PRN anxiety/insomnia          VITALS:   T(C): 36.7 (08-25-18 @ 17:53), Max: 36.9 (08-25-18 @ 11:33)  HR: 78 (08-25-18 @ 17:53) (74 - 80)  BP: 94/60 (08-25-18 @ 17:53) (94/60 - 110/65)  RR: 20 (08-25-18 @ 17:53) (17 - 20)  SpO2: 96% (08-25-18 @ 17:53) (93% - 96%)  Wt(kg): --    PHYSICAL EXAM:  GENERAL: NAD, well nourished and conversant  HEAD:  Atraumatic  EYES: EOM, PERRLA, conjunctiva pink and sclera white  ENT: No tonsillar erythema, exudates, or enlargement, moist mucous membranes, good dentition, no lesions  NECK: Supple, No JVD, normal thyroid, carotids with normal upstrokes and no bruits  CHEST/LUNG: soft rales at the left base  HEART: Regular rate and rhythm, No murmurs, rubs, or gallops  ABDOMEN: Soft, nondistended, no masses, guarding, tenderness or rebound, bowel sounds present  EXTREMITIES:  2+ Peripheral Pulses, No clubbing, cyanosis, or edema. (+) right periprosthetic fracture site draiign  LYMPH: No lymphadenopathy noted  SKIN: No rashes or lesions  NERVOUS SYSTEM:  Alert & Oriented X3, normal cognitive function. Motor Strength 5/5 right upper and right lower.  5/5 left upper and left lower extremities, DTRs 2+ intact and symmetric      LABS:        CBC Full  -  ( 25 Aug 2018 10:30 )  WBC Count : 9.48 K/uL  Hemoglobin : 9.5 g/dL  Hematocrit : 30.1 %  Platelet Count - Automated : 249 K/uL  Mean Cell Volume : 90.1 fl  Mean Cell Hemoglobin : 28.4 pg  Mean Cell Hemoglobin Concentration : 31.6 gm/dL  Auto Neutrophil # : x  Auto Lymphocyte # : x  Auto Monocyte # : x  Auto Eosinophil # : x  Auto Basophil # : x  Auto Neutrophil % : x  Auto Lymphocyte % : x  Auto Monocyte % : x  Auto Eosinophil % : x  Auto Basophil % : x    08-25    128<L>  |  95<L>  |  47<H>  ----------------------------<  169<H>  4.2   |  22  |  1.05    Ca    8.9      25 Aug 2018 09:58    < from: MR Femur No Cont, Right (08.24.18 @ 22:08) >  EXAM:  MR FEMUR RT                          EXAM:  MR HIP RT                            PROCEDURE DATE:  08/24/2018            INTERPRETATION:  MRI OF THE RIGHT HIP AND FEMUR.    CLINICAL INFORMATION: Recent revision. Bacteremic. Fluid collections   about the prosthesis.  TECHNIQUE: Multiplanar MR imaging was obtained of the right hip and   femur. Exam was terminated early due to patient discomfort.    COMPARISON: Ultrasound dated 8/24/2018 and CT dated 8/21/2018.    FINDINGS:    Status post right hip arthroplasty revision with longstem femoral   component and cerclage wires with associated susceptibility, limiting   evaluation of the osseous structures. Along the anterolateral aspect of   the femur there is a complex fluid collection measuring 17.5 x 2.4 x 5.2   cm with layering debris. There is edema in within the adjacent vastus   musculature. Nonspecific edema is also visualized within the adductor   muscles. There is a subcutaneous collection at the level of the right   greater trochanter extending distally measuring 17.6 x 7.1 x 1.6 cm.   There is no evidence of this collection communicating with the joint.   There is partially visualized fluid adjacent to the right hip prosthesis   in the region of the gluteus adonay (series8, image 1). There is a   moderate right joint effusion. There is a small right Baker's cyst. There   are prominent right inguinal lymph nodes. There is likely a large left   knee joint effusion.     IMPRESSION:  Status post right hip revision with susceptibility artifact.  Complex fluid collection along the anterolateral femur adjacent to the   hardware, which may represent a hematoma and/or infection. Additional   subcutaneous collection along the proximal lateral right thigh. Bilateral   knee joint effusions.                    GEETA CHOE M.D., ATTENDING RADIOLOGIST  This document has been electronically signed. Aug 25 2018  1:19PM    < end of copied text >

## 2018-08-25 NOTE — CHART NOTE - NSCHARTNOTEFT_GEN_A_CORE
Vanco trough 9.9. Will increase to Vancomycin 1 gm BID as discussed with ID Dr. Ander Cazares NP-C   #[id number]

## 2018-08-25 NOTE — PROGRESS NOTE ADULT - SUBJECTIVE AND OBJECTIVE BOX
CC: Patient is a 72y old  Female who presents with a chief complaint of fall (20 Aug 2018 19:53)    ID following for MRSA bacteremia, prosthetic hip infection and abscess    Interval History/ROS: Patient remains with pain, swelling and erythema of the right leg. Denies fever, chills. Repeat blood cxs NGTD    Rest of ROS negative.    Allergies  No Known Allergies    ANTIMICROBIALS:  vancomycin  IVPB 750 every 12 hours    PE:    Vital Signs Last 24 Hrs  T(C): 36.6 (25 Aug 2018 06:14), Max: 36.6 (25 Aug 2018 06:14)  T(F): 97.8 (25 Aug 2018 06:14), Max: 97.8 (25 Aug 2018 06:14)  HR: 80 (25 Aug 2018 06:14) (69 - 80)  BP: 110/65 (25 Aug 2018 06:14) (102/60 - 120/66)  BP(mean): --  RR: 17 (25 Aug 2018 06:14) (17 - 18)  SpO2: 93% (25 Aug 2018 06:14) (93% - 96%)    Gen: AOx3, NAD  CV: S1+S2 normal, no murmurs  Resp: Clear bilat, no resp distress  Abd: Soft, nontender, +BS  Ext: right hip and leg with erythema, induration, swelling and warmth  : No Silva  IV/Skin: No thrombophlebitis  Neuro: no focal deficits    LABS:                          9.3    8.52  )-----------( 219      ( 24 Aug 2018 07:22 )             30.5       08-24    132<L>  |  96  |  36<H>  ----------------------------<  108<H>  4.1   |  23  |  0.99    Ca    8.8      24 Aug 2018 06:49            MICROBIOLOGY:  v  .Blood Blood-Peripheral  08-23-18   No growth to date.  --  --      .Urine Clean Catch (Midstream)  08-23-18   50,000 - 99,000 CFU/mL Staphylococcus aureus  --  --      .Blood Blood  08-23-18   Growth in aerobic bottle: Gram Positive Cocci in Clusters  --    Growth in aerobic bottle: Gram Positive Cocci in Clusters      .Blood Blood  08-22-18   Growth in aerobic bottle: Methicillin resistant Staphylococcus aureus  "Due to technical problems, Proteus sp. will Not be reported as part of  the BCID panel until further notice"  ***Blood Panel PCR results on this specimen are available  approximately 3 hours after the Gram stain result.***  Gram stain, PCR, and/or culture results may not always  correspond due to difference in methodologies.  ************************************************************  This PCR assay was performed using [employer].  The following targets are tested for: Enterococcus,  vancomycin resistant enterococci, Listeria monocytogenes,  coagulase negative staphylococci, S. aureus,  methicillin resistant S. aureus, Streptococcus agalactiae  (Group B), S. pneumoniae, S. pyogenes (Group A),  Acinetobacter baumannii, Enterobacter cloacae, E. coli,  Klebsiella oxytoca, K. pneumoniae, Proteus sp.,  Serratia marcescens, Haemophilus influenzae,  Neisseria meningitidis, Pseudomonas aeruginosa, Candida  albicans, C. glabrata, C krusei, C parapsilosis,  C. tropicalis and the KPC resistance gene.  --  Blood Culture PCR  Methicillin resistant Staphylococcus aureus      .Urine Catheterized  08-20-18   No growth  --  --    RADIOLOGY:    < from: US Joint Nonvasc Extremity Limited, Right (08.24.18 @ 14:16) >  IMPRESSION:  Superficial subcutaneous complex collection as well as deep collection   along the anterior lateral femur, which may represent hematoma and/or   infection.    < end of copied text >

## 2018-08-25 NOTE — PROGRESS NOTE ADULT - ASSESSMENT
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 THR, kika-prosthetic R femur fx (s/p Total Hip revision with ORIF, 6/30/18), recent re-admission (07/16 - 07/24) for worsening R hip pain and decreased ability to ambulate now s/p R femur vancouver B1 periprosthetic fracture ORIF (07/19) who presented to the ED on 8/20 with R hip and thigh pain, swelling, stiffness, and redness in the setting of a mechanical fall on the stairs  in the hospital spiked to 102.9, blood cx with MRSA  LE CT with lucency at the acetabular screw and prox fem, fluid collection in lateral soft tissue of thigh, an other fluid collection next to vastus intermedius    Fevers resolved  MRSA bacteremia with hip hardware collection in view of complicated hip surgical history and recent ORIF  Repeat blood cxs NGTD  US with subcutaneous complex collection as well as deep collection along the lateral femur    Recommend:  * F/U repeat blood cxs   * Continue vanco 750mg IV q 12  * Continue to monitor vanco trough prior to the 4th dose  * TTE  * F/U MRI  * IR eval for abscess drainage now but ultimately the hardware should be removed  * Ortho f/u   * Consider Psych eval

## 2018-08-26 LAB
ANION GAP SERPL CALC-SCNC: 13 MMOL/L — SIGNIFICANT CHANGE UP (ref 5–17)
BUN SERPL-MCNC: 42 MG/DL — HIGH (ref 7–23)
CALCIUM SERPL-MCNC: 8.7 MG/DL — SIGNIFICANT CHANGE UP (ref 8.4–10.5)
CHLORIDE SERPL-SCNC: 98 MMOL/L — SIGNIFICANT CHANGE UP (ref 96–108)
CO2 SERPL-SCNC: 20 MMOL/L — LOW (ref 22–31)
CREAT SERPL-MCNC: 0.98 MG/DL — SIGNIFICANT CHANGE UP (ref 0.5–1.3)
GLUCOSE SERPL-MCNC: 157 MG/DL — HIGH (ref 70–99)
HCT VFR BLD CALC: 30.2 % — LOW (ref 34.5–45)
HGB BLD-MCNC: 9.4 G/DL — LOW (ref 11.5–15.5)
MCHC RBC-ENTMCNC: 28.7 PG — SIGNIFICANT CHANGE UP (ref 27–34)
MCHC RBC-ENTMCNC: 31.3 GM/DL — LOW (ref 32–36)
MCV RBC AUTO: 91.7 FL — SIGNIFICANT CHANGE UP (ref 80–100)
PLATELET # BLD AUTO: 238 K/UL — SIGNIFICANT CHANGE UP (ref 150–400)
POTASSIUM SERPL-MCNC: 4.5 MMOL/L — SIGNIFICANT CHANGE UP (ref 3.5–5.3)
POTASSIUM SERPL-SCNC: 4.5 MMOL/L — SIGNIFICANT CHANGE UP (ref 3.5–5.3)
RBC # BLD: 3.29 M/UL — LOW (ref 3.8–5.2)
RBC # FLD: 14.6 % — HIGH (ref 10.3–14.5)
SODIUM SERPL-SCNC: 131 MMOL/L — LOW (ref 135–145)
WBC # BLD: 8.3 K/UL — SIGNIFICANT CHANGE UP (ref 3.8–10.5)
WBC # FLD AUTO: 8.3 K/UL — SIGNIFICANT CHANGE UP (ref 3.8–10.5)

## 2018-08-26 RX ADMIN — QUETIAPINE FUMARATE 150 MILLIGRAM(S): 200 TABLET, FILM COATED ORAL at 21:24

## 2018-08-26 RX ADMIN — TRAMADOL HYDROCHLORIDE 50 MILLIGRAM(S): 50 TABLET ORAL at 13:19

## 2018-08-26 RX ADMIN — OXYCODONE HYDROCHLORIDE 20 MILLIGRAM(S): 5 TABLET ORAL at 07:05

## 2018-08-26 RX ADMIN — Medication 100 MILLIGRAM(S): at 17:32

## 2018-08-26 RX ADMIN — POLYETHYLENE GLYCOL 3350 17 GRAM(S): 17 POWDER, FOR SOLUTION ORAL at 13:21

## 2018-08-26 RX ADMIN — Medication 250 MILLIGRAM(S): at 10:22

## 2018-08-26 RX ADMIN — QUETIAPINE FUMARATE 50 MILLIGRAM(S): 200 TABLET, FILM COATED ORAL at 07:03

## 2018-08-26 RX ADMIN — TRAMADOL HYDROCHLORIDE 50 MILLIGRAM(S): 50 TABLET ORAL at 07:04

## 2018-08-26 RX ADMIN — HYDROMORPHONE HYDROCHLORIDE 8 MILLIGRAM(S): 2 INJECTION INTRAMUSCULAR; INTRAVENOUS; SUBCUTANEOUS at 10:31

## 2018-08-26 RX ADMIN — HYDROMORPHONE HYDROCHLORIDE 8 MILLIGRAM(S): 2 INJECTION INTRAMUSCULAR; INTRAVENOUS; SUBCUTANEOUS at 22:26

## 2018-08-26 RX ADMIN — GABAPENTIN 300 MILLIGRAM(S): 400 CAPSULE ORAL at 06:30

## 2018-08-26 RX ADMIN — HYDROMORPHONE HYDROCHLORIDE 8 MILLIGRAM(S): 2 INJECTION INTRAMUSCULAR; INTRAVENOUS; SUBCUTANEOUS at 16:35

## 2018-08-26 RX ADMIN — Medication 1 MILLIGRAM(S): at 13:20

## 2018-08-26 RX ADMIN — OXYCODONE HYDROCHLORIDE 20 MILLIGRAM(S): 5 TABLET ORAL at 17:32

## 2018-08-26 RX ADMIN — Medication 250 MILLIGRAM(S): at 22:28

## 2018-08-26 RX ADMIN — Medication 81 MILLIGRAM(S): at 13:20

## 2018-08-26 RX ADMIN — HYDROMORPHONE HYDROCHLORIDE 8 MILLIGRAM(S): 2 INJECTION INTRAMUSCULAR; INTRAVENOUS; SUBCUTANEOUS at 12:02

## 2018-08-26 RX ADMIN — PANTOPRAZOLE SODIUM 40 MILLIGRAM(S): 20 TABLET, DELAYED RELEASE ORAL at 06:36

## 2018-08-26 RX ADMIN — ENOXAPARIN SODIUM 40 MILLIGRAM(S): 100 INJECTION SUBCUTANEOUS at 13:21

## 2018-08-26 RX ADMIN — TRAMADOL HYDROCHLORIDE 50 MILLIGRAM(S): 50 TABLET ORAL at 06:34

## 2018-08-26 RX ADMIN — OXYCODONE HYDROCHLORIDE 20 MILLIGRAM(S): 5 TABLET ORAL at 19:05

## 2018-08-26 RX ADMIN — HYDROMORPHONE HYDROCHLORIDE 8 MILLIGRAM(S): 2 INJECTION INTRAMUSCULAR; INTRAVENOUS; SUBCUTANEOUS at 22:56

## 2018-08-26 RX ADMIN — OXYCODONE HYDROCHLORIDE 20 MILLIGRAM(S): 5 TABLET ORAL at 06:35

## 2018-08-26 RX ADMIN — Medication 100 MILLIGRAM(S): at 13:20

## 2018-08-26 RX ADMIN — GABAPENTIN 300 MILLIGRAM(S): 400 CAPSULE ORAL at 13:20

## 2018-08-26 RX ADMIN — GABAPENTIN 300 MILLIGRAM(S): 400 CAPSULE ORAL at 21:24

## 2018-08-26 RX ADMIN — Medication 100 MILLIGRAM(S): at 06:35

## 2018-08-26 RX ADMIN — HYDROMORPHONE HYDROCHLORIDE 8 MILLIGRAM(S): 2 INJECTION INTRAMUSCULAR; INTRAVENOUS; SUBCUTANEOUS at 03:20

## 2018-08-26 RX ADMIN — HYDROMORPHONE HYDROCHLORIDE 8 MILLIGRAM(S): 2 INJECTION INTRAMUSCULAR; INTRAVENOUS; SUBCUTANEOUS at 03:50

## 2018-08-26 RX ADMIN — Medication 1 PATCH: at 13:21

## 2018-08-26 RX ADMIN — TRAMADOL HYDROCHLORIDE 50 MILLIGRAM(S): 50 TABLET ORAL at 14:11

## 2018-08-26 RX ADMIN — Medication 1 PATCH: at 13:25

## 2018-08-26 RX ADMIN — HYDROMORPHONE HYDROCHLORIDE 8 MILLIGRAM(S): 2 INJECTION INTRAMUSCULAR; INTRAVENOUS; SUBCUTANEOUS at 14:25

## 2018-08-26 RX ADMIN — TRAMADOL HYDROCHLORIDE 50 MILLIGRAM(S): 50 TABLET ORAL at 17:32

## 2018-08-26 RX ADMIN — TRAMADOL HYDROCHLORIDE 50 MILLIGRAM(S): 50 TABLET ORAL at 19:05

## 2018-08-26 NOTE — PROGRESS NOTE ADULT - SUBJECTIVE AND OBJECTIVE BOX
71 yo F, w/ PMH of emphysema, CAD, HTN, revision USAMA with ORIF for periprosthetic R femur fracture on 6/30/18 by Dr. Be, discharged on 7/3/18 to [facility] rehab, readmitted from 07/16 - 07/24/18 w/ worsening R hip pain and decreased ability to ambulate, now s/p right femur vancouver B1 periprosthetic fracture ORIF by Dr. Cornell on 07/19/18, BIBEMS s/p mechanical fall on stairs during the night c/o R hip and R leg pain, redness, swelling, stiffness. Patient denies other complaints. Denies headache, neck stiffness, fever/chills, vision change, chest pain, shortness of breath, difficulty breathing, palpitations, weakness, dizziness, nausea, vomiting, diarrhea, syncope.   From prior, note, patient had initial right hip fracture surgery at [facility] with a hip pin placement in 2015.  She developed avascular necrosis, necessitating a right total hip replacement at [facility] in 2017.  She was well for one year and had an accidental fall taking out her garbage June, 2018. She was then diagnosed with a periprosthetic fracture. Repair consisted of lengthening the right total hip replacement and then stabilization with the distal femur.  At rehabilitation, she had a nontraumatic separation of this distal stabilization which required a periprosthetic revision. Patients/p revision of right hip surgery as of 07/19/18. Patient present with fever and unclrear etiology.  She presented to the ED on 8/20 with R hip and thigh pain, swelling, stiffness, and redness in the setting of a mechanical fall on the stairs.  Then in the hospital spiked to 102.9, blood cx with MRSA  LE CT with lucency at the acetabular screw and prox femur, fluid collection in lateral soft tissue of thigh, an other fluid collection next to vastus intermedius.  Will need biopsy or drainage of the fluid collection. Cultures now show MRSA in Blood  and Urine. Needed procedure to deterine if there is  MRSA IN THE FLUID COLLECTION IN THE LEG. as per ortho would like to take Patient to the OR for washout and possible removal of hardware       MEDICATIONS  (STANDING):  aspirin enteric coated 81 milliGRAM(s) Oral daily  buDESOnide   0.5 milliGRAM(s) Respule 0.5 milliGRAM(s) Inhalation two times a day  docusate sodium 100 milliGRAM(s) Oral two times a day  enoxaparin Injectable 40 milliGRAM(s) SubCutaneous daily  folic acid 1 milliGRAM(s) Oral daily  gabapentin 300 milliGRAM(s) Oral three times a day  nicotine - 21 mG/24Hr(s) Patch 1 patch Transdermal daily  oxyCODONE  ER Tablet 20 milliGRAM(s) Oral every 12 hours  pantoprazole    Tablet 40 milliGRAM(s) Oral before breakfast  polyethylene glycol 3350 17 Gram(s) Oral daily  QUEtiapine 150 milliGRAM(s) Oral at bedtime  senna 2 Tablet(s) Oral at bedtime  thiamine 100 milliGRAM(s) Oral daily  traMADol 50 milliGRAM(s) Oral every 6 hours  vancomycin  IVPB 1000 milliGRAM(s) IV Intermittent every 12 hours    MEDICATIONS  (PRN):  acetaminophen   Tablet 650 milliGRAM(s) Oral every 6 hours PRN For Temp greater than 38.5 C (101.3 F)  acetaminophen   Tablet. 650 milliGRAM(s) Oral every 6 hours PRN Mild Pain (1 - 3)  HYDROmorphone   Tablet 4 milliGRAM(s) Oral every 3 hours PRN Moderate Pain (4 - 6)  HYDROmorphone   Tablet 8 milliGRAM(s) Oral every 3 hours PRN Severe Pain (7 - 10)  QUEtiapine 50 milliGRAM(s) Oral every 6 hours PRN anxiety/insomnia          VITALS:   T(C): 37.8 (08-26-18 @ 20:17), Max: 37.8 (08-26-18 @ 20:17)  HR: 71 (08-26-18 @ 20:17) (71 - 83)  BP: 127/58 (08-26-18 @ 20:17) (108/63 - 127/58)  RR: 18 (08-26-18 @ 20:17) (18 - 18)  SpO2: 99% (08-26-18 @ 20:17) (96% - 99%)  Wt(kg): --      PHYSICAL EXAM:  GENERAL: NAD, well nourished and conversant  HEAD:  Atraumatic  EYES: EOM, PERRLA, conjunctiva pink and sclera white  ENT: No tonsillar erythema, exudates, or enlargement, moist mucous membranes, good dentition, no lesions  NECK: Supple, No JVD, normal thyroid, carotids with normal upstrokes and no bruits  CHEST/LUNG: soft rales at the left base  HEART: Regular rate and rhythm, No murmurs, rubs, or gallops  ABDOMEN: Soft, nondistended, no masses, guarding, tenderness or rebound, bowel sounds present  EXTREMITIES:  2+ Peripheral Pulses, No clubbing, cyanosis, or edema. (+) right periprosthetic fracture site draiign  LYMPH: No lymphadenopathy noted  SKIN: No rashes or lesions  NERVOUS SYSTEM:  Alert & Oriented X3, normal cognitive function. Motor Strength 5/5 right upper and right lower.  5/5 left upper and left lower extremities, DTRs 2+ intact and symmetric  LABS:        CBC Full  -  ( 26 Aug 2018 10:42 )  WBC Count : 8.3 K/uL  Hemoglobin : 9.4 g/dL  Hematocrit : 30.2 %  Platelet Count - Automated : 238 K/uL  Mean Cell Volume : 91.7 fl  Mean Cell Hemoglobin : 28.7 pg  Mean Cell Hemoglobin Concentration : 31.3 gm/dL  Auto Neutrophil # : x  Auto Lymphocyte # : x  Auto Monocyte # : x  Auto Eosinophil # : x  Auto Basophil # : x  Auto Neutrophil % : x  Auto Lymphocyte % : x  Auto Monocyte % : x  Auto Eosinophil % : x  Auto Basophil % : x    08-26    131<L>  |  98  |  42<H>  ----------------------------<  157<H>  4.5   |  20<L>  |  0.98    Ca    8.7      26 Aug 2018 10:42            CAPILLARY BLOOD GLUCOSE          RADIOLOGY & ADDITIONAL TESTS:

## 2018-08-26 NOTE — PROGRESS NOTE ADULT - SUBJECTIVE AND OBJECTIVE BOX
Patient seen and examined. Pain controlled.    Physical exam  VS: see EMR  Gen: NAD  Right LE: Incision with small defect with drainage. Diffuse erythema at leg. +EHL/FHL/TA/GSC. SILT L3-S1. +Capillary refill brisk. Compartments soft and compressible.      72F with s/p right vancouver B2 ORIF with PJI    Pending medical optimization, plan for OR next week  Pain control  Discussed with Dr eB, who agrees with above

## 2018-08-26 NOTE — PROGRESS NOTE ADULT - ASSESSMENT
Patient returns with right leg pain now found to have fever .  (+) MRSA bacteremia and (+)  urine cultures a well .  Patient draining from her per-prosthetic fracture site and IR aspiration of fluid collection was scheduled however patient refused and leg is oozing. Patient scheduled for washout and possible removal of hardware

## 2018-08-27 LAB
ANION GAP SERPL CALC-SCNC: 13 MMOL/L — SIGNIFICANT CHANGE UP (ref 5–17)
BUN SERPL-MCNC: 36 MG/DL — HIGH (ref 7–23)
CALCIUM SERPL-MCNC: 9 MG/DL — SIGNIFICANT CHANGE UP (ref 8.4–10.5)
CHLORIDE SERPL-SCNC: 95 MMOL/L — LOW (ref 96–108)
CO2 SERPL-SCNC: 23 MMOL/L — SIGNIFICANT CHANGE UP (ref 22–31)
CREAT SERPL-MCNC: 1.02 MG/DL — SIGNIFICANT CHANGE UP (ref 0.5–1.3)
GLUCOSE SERPL-MCNC: 138 MG/DL — HIGH (ref 70–99)
HCT VFR BLD CALC: 31.7 % — LOW (ref 34.5–45)
HGB BLD-MCNC: 9.9 G/DL — LOW (ref 11.5–15.5)
MAGNESIUM SERPL-MCNC: 2.1 MG/DL — SIGNIFICANT CHANGE UP (ref 1.6–2.6)
MCHC RBC-ENTMCNC: 28.3 PG — SIGNIFICANT CHANGE UP (ref 27–34)
MCHC RBC-ENTMCNC: 31.1 GM/DL — LOW (ref 32–36)
MCV RBC AUTO: 91 FL — SIGNIFICANT CHANGE UP (ref 80–100)
PLATELET # BLD AUTO: 291 K/UL — SIGNIFICANT CHANGE UP (ref 150–400)
POTASSIUM SERPL-MCNC: 4.5 MMOL/L — SIGNIFICANT CHANGE UP (ref 3.5–5.3)
POTASSIUM SERPL-SCNC: 4.5 MMOL/L — SIGNIFICANT CHANGE UP (ref 3.5–5.3)
RBC # BLD: 3.48 M/UL — LOW (ref 3.8–5.2)
RBC # FLD: 14.7 % — HIGH (ref 10.3–14.5)
SODIUM SERPL-SCNC: 131 MMOL/L — LOW (ref 135–145)
VANCOMYCIN TROUGH SERPL-MCNC: 14.5 UG/ML — SIGNIFICANT CHANGE UP (ref 10–20)
WBC # BLD: 8.5 K/UL — SIGNIFICANT CHANGE UP (ref 3.8–10.5)
WBC # FLD AUTO: 8.5 K/UL — SIGNIFICANT CHANGE UP (ref 3.8–10.5)

## 2018-08-27 PROCEDURE — 99233 SBSQ HOSP IP/OBS HIGH 50: CPT

## 2018-08-27 RX ORDER — IPRATROPIUM/ALBUTEROL SULFATE 18-103MCG
3 AEROSOL WITH ADAPTER (GRAM) INHALATION EVERY 6 HOURS
Qty: 0 | Refills: 0 | Status: DISCONTINUED | OUTPATIENT
Start: 2018-08-27 | End: 2018-09-03

## 2018-08-27 RX ORDER — TRAMADOL HYDROCHLORIDE 50 MG/1
50 TABLET ORAL EVERY 6 HOURS
Qty: 0 | Refills: 0 | Status: DISCONTINUED | OUTPATIENT
Start: 2018-08-27 | End: 2018-09-03

## 2018-08-27 RX ORDER — OXYCODONE HYDROCHLORIDE 5 MG/1
20 TABLET ORAL EVERY 12 HOURS
Qty: 0 | Refills: 0 | Status: DISCONTINUED | OUTPATIENT
Start: 2018-08-27 | End: 2018-09-03

## 2018-08-27 RX ORDER — HYDROMORPHONE HYDROCHLORIDE 2 MG/ML
8 INJECTION INTRAMUSCULAR; INTRAVENOUS; SUBCUTANEOUS
Qty: 0 | Refills: 0 | Status: DISCONTINUED | OUTPATIENT
Start: 2018-08-28 | End: 2018-09-04

## 2018-08-27 RX ORDER — HYDROMORPHONE HYDROCHLORIDE 2 MG/ML
4 INJECTION INTRAMUSCULAR; INTRAVENOUS; SUBCUTANEOUS
Qty: 0 | Refills: 0 | Status: DISCONTINUED | OUTPATIENT
Start: 2018-08-28 | End: 2018-09-04

## 2018-08-27 RX ORDER — CHLORHEXIDINE GLUCONATE 213 G/1000ML
1 SOLUTION TOPICAL
Qty: 0 | Refills: 0 | Status: DISCONTINUED | OUTPATIENT
Start: 2018-08-27 | End: 2018-09-07

## 2018-08-27 RX ADMIN — GABAPENTIN 300 MILLIGRAM(S): 400 CAPSULE ORAL at 13:15

## 2018-08-27 RX ADMIN — HYDROMORPHONE HYDROCHLORIDE 8 MILLIGRAM(S): 2 INJECTION INTRAMUSCULAR; INTRAVENOUS; SUBCUTANEOUS at 10:19

## 2018-08-27 RX ADMIN — HYDROMORPHONE HYDROCHLORIDE 8 MILLIGRAM(S): 2 INJECTION INTRAMUSCULAR; INTRAVENOUS; SUBCUTANEOUS at 03:52

## 2018-08-27 RX ADMIN — TRAMADOL HYDROCHLORIDE 50 MILLIGRAM(S): 50 TABLET ORAL at 22:19

## 2018-08-27 RX ADMIN — TRAMADOL HYDROCHLORIDE 50 MILLIGRAM(S): 50 TABLET ORAL at 17:45

## 2018-08-27 RX ADMIN — OXYCODONE HYDROCHLORIDE 20 MILLIGRAM(S): 5 TABLET ORAL at 17:46

## 2018-08-27 RX ADMIN — Medication 100 MILLIGRAM(S): at 05:23

## 2018-08-27 RX ADMIN — Medication 81 MILLIGRAM(S): at 13:15

## 2018-08-27 RX ADMIN — QUETIAPINE FUMARATE 150 MILLIGRAM(S): 200 TABLET, FILM COATED ORAL at 21:20

## 2018-08-27 RX ADMIN — GABAPENTIN 300 MILLIGRAM(S): 400 CAPSULE ORAL at 05:16

## 2018-08-27 RX ADMIN — HYDROMORPHONE HYDROCHLORIDE 8 MILLIGRAM(S): 2 INJECTION INTRAMUSCULAR; INTRAVENOUS; SUBCUTANEOUS at 00:25

## 2018-08-27 RX ADMIN — Medication 250 MILLIGRAM(S): at 10:19

## 2018-08-27 RX ADMIN — Medication 1 PATCH: at 14:00

## 2018-08-27 RX ADMIN — GABAPENTIN 300 MILLIGRAM(S): 400 CAPSULE ORAL at 21:21

## 2018-08-27 RX ADMIN — OXYCODONE HYDROCHLORIDE 20 MILLIGRAM(S): 5 TABLET ORAL at 05:15

## 2018-08-27 RX ADMIN — HYDROMORPHONE HYDROCHLORIDE 8 MILLIGRAM(S): 2 INJECTION INTRAMUSCULAR; INTRAVENOUS; SUBCUTANEOUS at 15:44

## 2018-08-27 RX ADMIN — TRAMADOL HYDROCHLORIDE 50 MILLIGRAM(S): 50 TABLET ORAL at 13:15

## 2018-08-27 RX ADMIN — HYDROMORPHONE HYDROCHLORIDE 8 MILLIGRAM(S): 2 INJECTION INTRAMUSCULAR; INTRAVENOUS; SUBCUTANEOUS at 20:16

## 2018-08-27 RX ADMIN — QUETIAPINE FUMARATE 50 MILLIGRAM(S): 200 TABLET, FILM COATED ORAL at 14:06

## 2018-08-27 RX ADMIN — TRAMADOL HYDROCHLORIDE 50 MILLIGRAM(S): 50 TABLET ORAL at 13:59

## 2018-08-27 RX ADMIN — TRAMADOL HYDROCHLORIDE 50 MILLIGRAM(S): 50 TABLET ORAL at 01:30

## 2018-08-27 RX ADMIN — HYDROMORPHONE HYDROCHLORIDE 8 MILLIGRAM(S): 2 INJECTION INTRAMUSCULAR; INTRAVENOUS; SUBCUTANEOUS at 13:07

## 2018-08-27 RX ADMIN — OXYCODONE HYDROCHLORIDE 20 MILLIGRAM(S): 5 TABLET ORAL at 05:45

## 2018-08-27 RX ADMIN — TRAMADOL HYDROCHLORIDE 50 MILLIGRAM(S): 50 TABLET ORAL at 19:28

## 2018-08-27 RX ADMIN — HYDROMORPHONE HYDROCHLORIDE 8 MILLIGRAM(S): 2 INJECTION INTRAMUSCULAR; INTRAVENOUS; SUBCUTANEOUS at 21:59

## 2018-08-27 RX ADMIN — TRAMADOL HYDROCHLORIDE 50 MILLIGRAM(S): 50 TABLET ORAL at 01:00

## 2018-08-27 RX ADMIN — Medication 100 MILLIGRAM(S): at 13:15

## 2018-08-27 RX ADMIN — Medication 1 MILLIGRAM(S): at 13:15

## 2018-08-27 RX ADMIN — ENOXAPARIN SODIUM 40 MILLIGRAM(S): 100 INJECTION SUBCUTANEOUS at 13:15

## 2018-08-27 RX ADMIN — TRAMADOL HYDROCHLORIDE 50 MILLIGRAM(S): 50 TABLET ORAL at 23:46

## 2018-08-27 RX ADMIN — Medication 1 PATCH: at 13:15

## 2018-08-27 RX ADMIN — TRAMADOL HYDROCHLORIDE 50 MILLIGRAM(S): 50 TABLET ORAL at 05:23

## 2018-08-27 RX ADMIN — PANTOPRAZOLE SODIUM 40 MILLIGRAM(S): 20 TABLET, DELAYED RELEASE ORAL at 05:23

## 2018-08-27 RX ADMIN — TRAMADOL HYDROCHLORIDE 50 MILLIGRAM(S): 50 TABLET ORAL at 05:43

## 2018-08-27 RX ADMIN — Medication 250 MILLIGRAM(S): at 22:19

## 2018-08-27 RX ADMIN — Medication 100 MILLIGRAM(S): at 17:46

## 2018-08-27 NOTE — PROGRESS NOTE ADULT - SUBJECTIVE AND OBJECTIVE BOX
Patient seen and examined. Pain controlled.    Physical exam  VS: see EMR  Gen: NAD  Right LE: Incision with small defect with drainage. Diffuse erythema at leg. +EHL/FHL/TA/GSC. SILT L3-S1. +Capillary refill brisk. Compartments soft and compressible.      72F with s/p right vancouver B2 ORIF with PJI    Pending medical optimization, plan for OR this week  Pain control  Discussed with Dr Be, who agrees with above

## 2018-08-27 NOTE — PROGRESS NOTE ADULT - SUBJECTIVE AND OBJECTIVE BOX
73 yo F, w/ PMH of emphysema, CAD, HTN, revision USAMA with ORIF for periprosthetic R femur fracture on 6/30/18 by Dr. Be, discharged on 7/3/18 to [facility] rehab, readmitted from 07/16 - 07/24/18 w/ worsening R hip pain and decreased ability to ambulate, now s/p right femur vancouver B1 periprosthetic fracture ORIF by Dr. Cornell on 07/19/18, BIBEMS s/p mechanical fall on stairs during the night c/o R hip and R leg pain, redness, swelling, stiffness. Patient denies other complaints. Denies headache, neck stiffness, fever/chills, vision change, chest pain, shortness of breath, difficulty breathing, palpitations, weakness, dizziness, nausea, vomiting, diarrhea, syncope.   From prior, note, patient had initial right hip fracture surgery at [facility] with a hip pin placement in 2015.  She developed avascular necrosis, necessitating a right total hip replacement at [facility] in 2017.  She was well for one year and had an accidental fall taking out her garbage June, 2018. She was then diagnosed with a periprosthetic fracture. Repair consisted of lengthening the right total hip replacement and then stabilization with the distal femur.  At rehabilitation, she had a nontraumatic separation of this distal stabilization which required a periprosthetic revision. Patients/p revision of right hip surgery as of 07/19/18. Patient present with fever and unclrear etiology.  She presented to the ED on 8/20 with R hip and thigh pain, swelling, stiffness, and redness in the setting of a mechanical fall on the stairs.  Then in the hospital spiked to 102.9, blood cx with MRSA  LE CT with lucency at the acetabular screw and prox femur, fluid collection in lateral soft tissue of thigh, an other fluid collection next to vastus intermedius.  Will need biopsy or drainage of the fluid collection. Cultures now show MRSA in Blood  and Urine. Needed procedure to deterine if there is  MRSA IN THE FLUID COLLECTION IN THE LEG. as per ortho would like to take Patient to the OR for washout and possible removal of hardware       MEDICATIONS  (STANDING):  ALBUTerol/ipratropium for Nebulization 3 milliLiter(s) Nebulizer every 6 hours  aspirin enteric coated 81 milliGRAM(s) Oral daily  buDESOnide   0.5 milliGRAM(s) Respule 0.5 milliGRAM(s) Inhalation two times a day  chlorhexidine 4% Liquid 1 Application(s) Topical <User Schedule>  docusate sodium 100 milliGRAM(s) Oral two times a day  enoxaparin Injectable 40 milliGRAM(s) SubCutaneous daily  folic acid 1 milliGRAM(s) Oral daily  gabapentin 300 milliGRAM(s) Oral three times a day  nicotine - 21 mG/24Hr(s) Patch 1 patch Transdermal daily  oxyCODONE  ER Tablet 20 milliGRAM(s) Oral every 12 hours  pantoprazole    Tablet 40 milliGRAM(s) Oral before breakfast  polyethylene glycol 3350 17 Gram(s) Oral daily  QUEtiapine 150 milliGRAM(s) Oral at bedtime  senna 2 Tablet(s) Oral at bedtime  thiamine 100 milliGRAM(s) Oral daily  traMADol 50 milliGRAM(s) Oral every 6 hours  vancomycin  IVPB 1000 milliGRAM(s) IV Intermittent every 12 hours    MEDICATIONS  (PRN):  acetaminophen   Tablet 650 milliGRAM(s) Oral every 6 hours PRN For Temp greater than 38.5 C (101.3 F)  acetaminophen   Tablet. 650 milliGRAM(s) Oral every 6 hours PRN Mild Pain (1 - 3)  HYDROmorphone   Tablet 4 milliGRAM(s) Oral every 3 hours PRN Moderate Pain (4 - 6)  HYDROmorphone   Tablet 8 milliGRAM(s) Oral every 3 hours PRN Severe Pain (7 - 10)  QUEtiapine 50 milliGRAM(s) Oral every 6 hours PRN anxiety/insomnia          VITALS:   T(C): 36.2 (08-27-18 @ 21:16), Max: 37.4 (08-27-18 @ 05:14)  HR: 78 (08-27-18 @ 21:16) (78 - 81)  BP: 147/69 (08-27-18 @ 21:16) (128/85 - 147/69)  RR: 17 (08-27-18 @ 21:16) (17 - 18)  SpO2: 95% (08-27-18 @ 21:16) (95% - 96%)  Wt(kg): --      PHYSICAL EXAM:  GENERAL: NAD, well nourished and conversant  HEAD:  Atraumatic  EYES: EOM, PERRLA, conjunctiva pink and sclera white  ENT: No tonsillar erythema, exudates, or enlargement, moist mucous membranes, good dentition, no lesions  NECK: Supple, No JVD, normal thyroid, carotids with normal upstrokes and no bruits  CHEST/LUNG: soft rales at the left base  HEART: Regular rate and rhythm, No murmurs, rubs, or gallops  ABDOMEN: Soft, nondistended, no masses, guarding, tenderness or rebound, bowel sounds present  EXTREMITIES:  2+ Peripheral Pulses, No clubbing, cyanosis, or edema. (+) right periprosthetic fracture site draiign  LYMPH: No lymphadenopathy noted  SKIN: No rashes or lesions  NERVOUS SYSTEM:  Alert & Oriented X3, normal cognitive function. Motor Strength 5/5 right upper and right lower.  5/5 left upper and left lower extremities, DTRs 2+ intact and symmetric  LABS:        CBC Full  -  ( 27 Aug 2018 10:00 )  WBC Count : 8.5 K/uL  Hemoglobin : 9.9 g/dL  Hematocrit : 31.7 %  Platelet Count - Automated : 291 K/uL  Mean Cell Volume : 91.0 fl  Mean Cell Hemoglobin : 28.3 pg  Mean Cell Hemoglobin Concentration : 31.1 gm/dL  Auto Neutrophil # : x  Auto Lymphocyte # : x  Auto Monocyte # : x  Auto Eosinophil # : x  Auto Basophil # : x  Auto Neutrophil % : x  Auto Lymphocyte % : x  Auto Monocyte % : x  Auto Eosinophil % : x  Auto Basophil % : x    08-27    131<L>  |  95<L>  |  36<H>  ----------------------------<  138<H>  4.5   |  23  |  1.02    Ca    9.0      27 Aug 2018 10:00  Mg     2.1     08-27            CAPILLARY BLOOD GLUCOSE          RADIOLOGY & ADDITIONAL TESTS:

## 2018-08-27 NOTE — PROGRESS NOTE ADULT - ASSESSMENT
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 THR, kika-prosthetic R femur fx (s/p Total Hip revision with ORIF, 6/30/18), recent re-admission (07/16 - 07/24) for worsening R hip pain and decreased ability to ambulate now s/p R femur vancouver B1 periprosthetic fracture ORIF (07/19) who presented to the ED on 8/20 with R hip and thigh pain, swelling, stiffness, and redness in the setting of a mechanical fall on the stairs  in the hospital spiked to 102.9, blood cx with MRSA  LE CT with lucency at the acetabular screw and prox fem, fluid collection in lateral soft tissue of thigh, an other fluid collection next to vastus intermedius  MRI also showed Complex fluid collection along the anterolateral femur adjacent to the hardware and a subcutaneous collection along the proximal lateral right thigh.    high fever, MRSA bacteremia with hip hardware collection in view of complicated hip surgical history and recent ORIF  pt is getting optimized for OR    * c/w vanco 1 g q 12  * check the trough tomorrow  * TTE  * ortho f/u   * psych eval

## 2018-08-27 NOTE — PROGRESS NOTE ADULT - SUBJECTIVE AND OBJECTIVE BOX
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    Interval History: pt stable and afebrile, repeat blood cx negative, pt is getting optimized for OR    ROS:      All other systems negative    Constitutional: no fever, no chills  Head: no trauma  Eyes: no vision changes, no eye pain  ENT:  no sore throat, no rhinorrhea  Cardiovascular:  no chest pain, no palpitation  Respiratory:  no SOB, no cough  GI:  no abd pain, no vomiting, no diarrhea  urinary: no dysuria, no hematuria, no flank pain  musculoskeletal:  right thigh and hip pain with edema, erythema  skin:  no rash  neurology:  no headache, no seizure, no change in mental status  psych: no anxiety, no depression       Allergies  No Known Allergies        ANTIMICROBIALS:  vancomycin  IVPB 1000 every 12 hours      OTHER MEDS:  acetaminophen   Tablet 650 milliGRAM(s) Oral every 6 hours PRN  acetaminophen   Tablet. 650 milliGRAM(s) Oral every 6 hours PRN  ALBUTerol/ipratropium for Nebulization 3 milliLiter(s) Nebulizer every 6 hours  aspirin enteric coated 81 milliGRAM(s) Oral daily  buDESOnide   0.5 milliGRAM(s) Respule 0.5 milliGRAM(s) Inhalation two times a day  chlorhexidine 4% Liquid 1 Application(s) Topical <User Schedule>  docusate sodium 100 milliGRAM(s) Oral two times a day  enoxaparin Injectable 40 milliGRAM(s) SubCutaneous daily  folic acid 1 milliGRAM(s) Oral daily  gabapentin 300 milliGRAM(s) Oral three times a day  HYDROmorphone   Tablet 4 milliGRAM(s) Oral every 3 hours PRN  HYDROmorphone   Tablet 8 milliGRAM(s) Oral every 3 hours PRN  nicotine - 21 mG/24Hr(s) Patch 1 patch Transdermal daily  pantoprazole    Tablet 40 milliGRAM(s) Oral before breakfast  polyethylene glycol 3350 17 Gram(s) Oral daily  QUEtiapine 50 milliGRAM(s) Oral every 6 hours PRN  QUEtiapine 150 milliGRAM(s) Oral at bedtime  senna 2 Tablet(s) Oral at bedtime  thiamine 100 milliGRAM(s) Oral daily      Vital Signs Last 24 Hrs  T(C): 36.4 (27 Aug 2018 08:32), Max: 37.8 (26 Aug 2018 20:17)  T(F): 97.5 (27 Aug 2018 08:32), Max: 100.1 (26 Aug 2018 20:17)  HR: 80 (27 Aug 2018 08:32) (71 - 81)  BP: 128/85 (27 Aug 2018 08:32) (127/58 - 131/71)  BP(mean): --  RR: 18 (27 Aug 2018 08:32) (18 - 18)  SpO2: 96% (27 Aug 2018 08:32) (96% - 99%)    Physical Exam:  General:    NAD,  non toxic, A&O x 3  Head: atraumatic, normocephalic  Eye: normal sclera and conjunctiva  ENT:    no oropharyngeal lesions,   no LAD,   neck supple  Cardio:     regular S1, S2,  no murmur  Respiratory:    clear b/l,    no wheezing  abd:     soft,   BS +,   no tenderness,    no organomegaly  :   no CVAT,  no suprapubic tenderness,   no  edmondson  Musculoskeletal:   R thigh erythema, edema and warmth around the incision, some tenderness  vascular: no lines, normal pulses  Skin:    no rash  Neurologic:     no focal deficit  psych: pt paranoid                          9.9    8.5   )-----------( 291      ( 27 Aug 2018 10:00 )             31.7       08-27    131<L>  |  95<L>  |  36<H>  ----------------------------<  138<H>  4.5   |  23  |  1.02    Ca    9.0      27 Aug 2018 10:00  Mg     2.1     08-27            MICROBIOLOGY:  Vancomycin Level, Trough: 14.5 ug/mL (08-27-18 @ 10:00)  v  .Blood Blood-Peripheral  08-23-18   No growth to date.  --  --      .Urine Clean Catch (Midstream)  08-23-18   50,000 - 99,000 CFU/mL Methicillin resistant Staphylococcus aureus  --  Methicillin resistant Staphylococcus aureus      .Blood Blood  08-23-18   Growth in aerobic bottle: Coag Negative Staphylococcus  Single set isolate, possible contaminant. Contact  Microbiology if susceptibility testing clinically  indicated.  --    Growth in aerobic bottle: Gram Positive Cocci in Clusters      .Blood Blood  08-22-18   Growth in aerobic bottle: Methicillin resistant Staphylococcus aureus  "Due to technical problems, Proteus sp. will Not be reported as part of  the BCID panel until further notice"  ***Blood Panel PCR results on this specimen are available  approximately 3 hours after the Gram stain result.***  Gram stain, PCR, and/or culture results may not always  correspond due to difference in methodologies.  ************************************************************  This PCR assay was performed using [employer].  The following targets are tested for: Enterococcus,  vancomycin resistant enterococci, Listeria monocytogenes,  coagulase negative staphylococci, S. aureus,  methicillin resistant S. aureus, Streptococcus agalactiae  (Group B), S. pneumoniae, S. pyogenes (Group A),  Acinetobacter baumannii, Enterobacter cloacae, E. coli,  Klebsiella oxytoca, K. pneumoniae, Proteus sp.,  Serratia marcescens, Haemophilus influenzae,  Neisseria meningitidis, Pseudomonas aeruginosa, Candida  albicans, C. glabrata, C krusei, C parapsilosis,  C. tropicalis and the KPC resistance gene.  --  Blood Culture PCR  Methicillin resistant Staphylococcus aureus      .Urine Catheterized  08-20-18   No growth  --  --                RADIOLOGY:  Images below reviewed personally  < from: MR Femur No Cont, Right (08.24.18 @ 22:08) >  IMPRESSION:  Status post right hip revision with susceptibility artifact.  Complex fluid collection along the anterolateral femur adjacent to the   hardware, which may represent a hematoma and/or infection. Additional   subcutaneous collection along the proximal lateral right thigh. Bilateral   knee joint effusions. MED/SURG

## 2018-08-28 ENCOUNTER — TRANSCRIPTION ENCOUNTER (OUTPATIENT)
Age: 73
End: 2018-08-28

## 2018-08-28 LAB
CULTURE RESULTS: SIGNIFICANT CHANGE UP
CULTURE RESULTS: SIGNIFICANT CHANGE UP
SPECIMEN SOURCE: SIGNIFICANT CHANGE UP
SPECIMEN SOURCE: SIGNIFICANT CHANGE UP
VANCOMYCIN TROUGH SERPL-MCNC: 17.9 UG/ML — SIGNIFICANT CHANGE UP (ref 10–20)

## 2018-08-28 PROCEDURE — 99233 SBSQ HOSP IP/OBS HIGH 50: CPT | Mod: GC

## 2018-08-28 RX ADMIN — TRAMADOL HYDROCHLORIDE 50 MILLIGRAM(S): 50 TABLET ORAL at 23:11

## 2018-08-28 RX ADMIN — TRAMADOL HYDROCHLORIDE 50 MILLIGRAM(S): 50 TABLET ORAL at 17:39

## 2018-08-28 RX ADMIN — HYDROMORPHONE HYDROCHLORIDE 8 MILLIGRAM(S): 2 INJECTION INTRAMUSCULAR; INTRAVENOUS; SUBCUTANEOUS at 00:33

## 2018-08-28 RX ADMIN — Medication 250 MILLIGRAM(S): at 21:53

## 2018-08-28 RX ADMIN — HYDROMORPHONE HYDROCHLORIDE 8 MILLIGRAM(S): 2 INJECTION INTRAMUSCULAR; INTRAVENOUS; SUBCUTANEOUS at 20:42

## 2018-08-28 RX ADMIN — HYDROMORPHONE HYDROCHLORIDE 8 MILLIGRAM(S): 2 INJECTION INTRAMUSCULAR; INTRAVENOUS; SUBCUTANEOUS at 12:11

## 2018-08-28 RX ADMIN — OXYCODONE HYDROCHLORIDE 20 MILLIGRAM(S): 5 TABLET ORAL at 06:45

## 2018-08-28 RX ADMIN — HYDROMORPHONE HYDROCHLORIDE 8 MILLIGRAM(S): 2 INJECTION INTRAMUSCULAR; INTRAVENOUS; SUBCUTANEOUS at 08:08

## 2018-08-28 RX ADMIN — Medication 250 MILLIGRAM(S): at 09:46

## 2018-08-28 RX ADMIN — OXYCODONE HYDROCHLORIDE 20 MILLIGRAM(S): 5 TABLET ORAL at 17:40

## 2018-08-28 RX ADMIN — HYDROMORPHONE HYDROCHLORIDE 8 MILLIGRAM(S): 2 INJECTION INTRAMUSCULAR; INTRAVENOUS; SUBCUTANEOUS at 17:19

## 2018-08-28 RX ADMIN — TRAMADOL HYDROCHLORIDE 50 MILLIGRAM(S): 50 TABLET ORAL at 12:00

## 2018-08-28 RX ADMIN — GABAPENTIN 300 MILLIGRAM(S): 400 CAPSULE ORAL at 12:03

## 2018-08-28 RX ADMIN — HYDROMORPHONE HYDROCHLORIDE 8 MILLIGRAM(S): 2 INJECTION INTRAMUSCULAR; INTRAVENOUS; SUBCUTANEOUS at 12:10

## 2018-08-28 RX ADMIN — TRAMADOL HYDROCHLORIDE 50 MILLIGRAM(S): 50 TABLET ORAL at 11:57

## 2018-08-28 RX ADMIN — TRAMADOL HYDROCHLORIDE 50 MILLIGRAM(S): 50 TABLET ORAL at 17:40

## 2018-08-28 RX ADMIN — OXYCODONE HYDROCHLORIDE 20 MILLIGRAM(S): 5 TABLET ORAL at 05:04

## 2018-08-28 RX ADMIN — QUETIAPINE FUMARATE 150 MILLIGRAM(S): 200 TABLET, FILM COATED ORAL at 20:41

## 2018-08-28 RX ADMIN — Medication 1 MILLIGRAM(S): at 11:56

## 2018-08-28 RX ADMIN — ENOXAPARIN SODIUM 40 MILLIGRAM(S): 100 INJECTION SUBCUTANEOUS at 11:56

## 2018-08-28 RX ADMIN — TRAMADOL HYDROCHLORIDE 50 MILLIGRAM(S): 50 TABLET ORAL at 05:05

## 2018-08-28 RX ADMIN — CHLORHEXIDINE GLUCONATE 1 APPLICATION(S): 213 SOLUTION TOPICAL at 14:15

## 2018-08-28 RX ADMIN — Medication 3 MILLILITER(S): at 23:15

## 2018-08-28 RX ADMIN — Medication 1 PATCH: at 11:57

## 2018-08-28 RX ADMIN — PANTOPRAZOLE SODIUM 40 MILLIGRAM(S): 20 TABLET, DELAYED RELEASE ORAL at 05:06

## 2018-08-28 RX ADMIN — GABAPENTIN 300 MILLIGRAM(S): 400 CAPSULE ORAL at 05:06

## 2018-08-28 RX ADMIN — GABAPENTIN 300 MILLIGRAM(S): 400 CAPSULE ORAL at 20:41

## 2018-08-28 RX ADMIN — HYDROMORPHONE HYDROCHLORIDE 8 MILLIGRAM(S): 2 INJECTION INTRAMUSCULAR; INTRAVENOUS; SUBCUTANEOUS at 21:25

## 2018-08-28 RX ADMIN — TRAMADOL HYDROCHLORIDE 50 MILLIGRAM(S): 50 TABLET ORAL at 06:45

## 2018-08-28 RX ADMIN — HYDROMORPHONE HYDROCHLORIDE 8 MILLIGRAM(S): 2 INJECTION INTRAMUSCULAR; INTRAVENOUS; SUBCUTANEOUS at 15:35

## 2018-08-28 RX ADMIN — Medication 1 PATCH: at 12:11

## 2018-08-28 RX ADMIN — Medication 81 MILLIGRAM(S): at 11:56

## 2018-08-28 RX ADMIN — HYDROMORPHONE HYDROCHLORIDE 8 MILLIGRAM(S): 2 INJECTION INTRAMUSCULAR; INTRAVENOUS; SUBCUTANEOUS at 12:00

## 2018-08-28 RX ADMIN — QUETIAPINE FUMARATE 50 MILLIGRAM(S): 200 TABLET, FILM COATED ORAL at 12:10

## 2018-08-28 RX ADMIN — Medication 100 MILLIGRAM(S): at 11:59

## 2018-08-28 RX ADMIN — OXYCODONE HYDROCHLORIDE 20 MILLIGRAM(S): 5 TABLET ORAL at 17:39

## 2018-08-28 RX ADMIN — HYDROMORPHONE HYDROCHLORIDE 8 MILLIGRAM(S): 2 INJECTION INTRAMUSCULAR; INTRAVENOUS; SUBCUTANEOUS at 01:44

## 2018-08-28 NOTE — PROGRESS NOTE ADULT - ASSESSMENT
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 THR, kika-prosthetic R femur fx (s/p Total Hip revision with ORIF, 6/30/18), recent re-admission (07/16 - 07/24) for worsening R hip pain and decreased ability to ambulate now s/p R femur vancouver B1 periprosthetic fracture ORIF (07/19) who presented to the ED on 8/20 with R hip and thigh pain, swelling, stiffness, and redness in the setting of a mechanical fall on the stairs  in the hospital spiked to 102.9, blood cx with MRSA  LE CT with lucency at the acetabular screw and prox fem, fluid collection in lateral soft tissue of thigh, an other fluid collection next to vastus intermedius  MRI also showed Complex fluid collection along the anterolateral femur adjacent to the hardware and a subcutaneous collection along the proximal lateral right thigh.    high fever, MRSA bacteremia with hip hardware collection in view of complicated hip surgical history and recent ORIF  pt is getting optimized for OR tomorrow  monitoring drug leves    * will need hardware removal as there are multiple complex collections next to the hardware and it is infected  * c/w vanco 1 g q 12  * check the trough daily for now as it was fluctuating  * TTE  * ortho f/u

## 2018-08-28 NOTE — PROGRESS NOTE ADULT - SUBJECTIVE AND OBJECTIVE BOX
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    Interval History: pt stable and afebrile, repeat blood cx negative, going to OR tomorrow    ROS:      All other systems negative    Constitutional: no fever, no chills  Head: no trauma  Eyes: no vision changes, no eye pain  ENT:  no sore throat, no rhinorrhea  Cardiovascular:  no chest pain, no palpitation  Respiratory:  no SOB, no cough  GI:  no abd pain, no vomiting, no diarrhea  urinary: no dysuria, no hematuria, no flank pain  musculoskeletal:  right thigh and hip pain with edema, erythema  skin:  no rash  neurology:  no headache, no seizure, no change in mental status  psych: no anxiety, no depression         Allergies  No Known Allergies        ANTIMICROBIALS:  vancomycin  IVPB 1000 every 12 hours      OTHER MEDS:  acetaminophen   Tablet 650 milliGRAM(s) Oral every 6 hours PRN  acetaminophen   Tablet. 650 milliGRAM(s) Oral every 6 hours PRN  ALBUTerol/ipratropium for Nebulization 3 milliLiter(s) Nebulizer every 6 hours  aspirin enteric coated 81 milliGRAM(s) Oral daily  buDESOnide   0.5 milliGRAM(s) Respule 0.5 milliGRAM(s) Inhalation two times a day  chlorhexidine 4% Liquid 1 Application(s) Topical <User Schedule>  docusate sodium 100 milliGRAM(s) Oral two times a day  folic acid 1 milliGRAM(s) Oral daily  gabapentin 300 milliGRAM(s) Oral three times a day  HYDROmorphone   Tablet 4 milliGRAM(s) Oral every 3 hours PRN  HYDROmorphone   Tablet 8 milliGRAM(s) Oral every 3 hours PRN  nicotine - 21 mG/24Hr(s) Patch 1 patch Transdermal daily  oxyCODONE  ER Tablet 20 milliGRAM(s) Oral every 12 hours  pantoprazole    Tablet 40 milliGRAM(s) Oral before breakfast  polyethylene glycol 3350 17 Gram(s) Oral daily  QUEtiapine 50 milliGRAM(s) Oral every 6 hours PRN  QUEtiapine 150 milliGRAM(s) Oral at bedtime  senna 2 Tablet(s) Oral at bedtime  thiamine 100 milliGRAM(s) Oral daily  traMADol 50 milliGRAM(s) Oral every 6 hours      Vital Signs Last 24 Hrs  T(C): 36.7 (28 Aug 2018 07:41), Max: 37.3 (28 Aug 2018 05:03)  T(F): 98 (28 Aug 2018 07:41), Max: 99.2 (28 Aug 2018 05:03)  HR: 75 (28 Aug 2018 07:41) (75 - 78)  BP: 117/63 (28 Aug 2018 07:41) (117/63 - 147/69)  BP(mean): --  RR: 16 (28 Aug 2018 07:41) (16 - 17)  SpO2: 95% (28 Aug 2018 07:41) (95% - 95%)    Physical Exam:  General:    NAD,  non toxic, A&O x 3  Head: atraumatic, normocephalic  Eye: normal sclera and conjunctiva  ENT:    no oropharyngeal lesions,   no LAD,   neck supple  Cardio:     regular S1, S2,  no murmur  Respiratory:    clear b/l,    no wheezing  abd:     soft,   BS +,   no tenderness,    no organomegaly  :   no CVAT,  no suprapubic tenderness,   no  edmondson  Musculoskeletal:   R thigh erythema, tense edema and warmth around the incision, some tenderness  vascular: no lines, normal pulses  Skin:    no rash  Neurologic:     no focal deficit  psych: pt paranoid but today was more cooperative                          9.9    8.5   )-----------( 291      ( 27 Aug 2018 10:00 )             31.7       08-27    131<L>  |  95<L>  |  36<H>  ----------------------------<  138<H>  4.5   |  23  |  1.02    Ca    9.0      27 Aug 2018 10:00  Mg     2.1     08-27            MICROBIOLOGY:  v  .Blood Blood-Peripheral  08-23-18   No growth to date.  --  --      .Urine Clean Catch (Midstream)  08-23-18   50,000 - 99,000 CFU/mL Methicillin resistant Staphylococcus aureus  --  Methicillin resistant Staphylococcus aureus      .Blood Blood  08-23-18   Growth in aerobic bottle: Coag Negative Staphylococcus  Single set isolate, possible contaminant. Contact  Microbiology if susceptibility testing clinically  indicated.  --    Growth in aerobic bottle: Gram Positive Cocci in Clusters      .Blood Blood  08-22-18   Growth in aerobic bottle: Methicillin resistant Staphylococcus aureus      .Urine Catheterized  08-20-18   No growth  --  --                RADIOLOGY:  Images below reviewed personally  < from: MR Femur No Cont, Right (08.24.18 @ 22:08) >  IMPRESSION:  Status post right hip revision with susceptibility artifact.  Complex fluid collection along the anterolateral femur adjacent to the   hardware, which may represent a hematoma and/or infection. Additional   subcutaneous collection along the proximal lateral right thigh. Bilateral   knee joint effusions.

## 2018-08-28 NOTE — PROGRESS NOTE ADULT - SUBJECTIVE AND OBJECTIVE BOX
Ortho Progress Note    S: Patient seen and examined. No acute events overnight. Pain well controlled with current regimen. Denies lightheadedness/dizziness, CP/SOB. Tolerating diet.       O:  Physical Exam:  Gen: Laying in bed, NAD, alert and oriented.   Resp: Unlabored breathing  Ext: EHL/FHL/TA/Sol intact          + SILT DP/SP/MCDANIEL/Sa          +DP, extremity WWP          incision intact, with diffuse thigh erythema and edema    Vital Signs Last 24 Hrs  T(C): 37.3 (28 Aug 2018 05:03), Max: 37.3 (28 Aug 2018 05:03)  T(F): 99.2 (28 Aug 2018 05:03), Max: 99.2 (28 Aug 2018 05:03)  HR: 78 (28 Aug 2018 05:03) (78 - 80)  BP: 127/55 (28 Aug 2018 05:03) (127/55 - 147/69)  BP(mean): --  RR: 17 (28 Aug 2018 05:03) (17 - 18)  SpO2: 95% (28 Aug 2018 05:03) (95% - 96%)                          9.9    8.5   )-----------( 291      ( 27 Aug 2018 10:00 )             31.7                         9.4    8.3   )-----------( 238      ( 26 Aug 2018 10:42 )             30.2       08-27    131<L>  |  95<L>  |  36<H>  ----------------------------<  138<H>  4.5   |  23  |  1.02

## 2018-08-28 NOTE — PROGRESS NOTE ADULT - SUBJECTIVE AND OBJECTIVE BOX
73 yo F, w/ PMH of emphysema, CAD, HTN, revision USAMA with ORIF for periprosthetic R femur fracture on 6/30/18 by Dr. Be, discharged on 7/3/18 to [facility] rehab, readmitted from 07/16 - 07/24/18 w/ worsening R hip pain and decreased ability to ambulate, now s/p right femur vancouver B1 periprosthetic fracture ORIF by Dr. Cornell on 07/19/18, BIBEMS s/p mechanical fall on stairs during the night c/o R hip and R leg pain, redness, swelling, stiffness. Patient denies other complaints. Denies headache, neck stiffness, fever/chills, vision change, chest pain, shortness of breath, difficulty breathing, palpitations, weakness, dizziness, nausea, vomiting, diarrhea, syncope.   From prior, note, patient had initial right hip fracture surgery at [facility] with a hip pin placement in 2015.  She developed avascular necrosis, necessitating a right total hip replacement at [facility] in 2017.  She was well for one year and had an accidental fall taking out her garbage June, 2018. She was then diagnosed with a periprosthetic fracture. Repair consisted of lengthening the right total hip replacement and then stabilization with the distal femur.  At rehabilitation, she had a nontraumatic separation of this distal stabilization which required a periprosthetic revision. Patients/p revision of right hip surgery as of 07/19/18. Patient present with fever and unclrear etiology.  She presented to the ED on 8/20 with R hip and thigh pain, swelling, stiffness, and redness in the setting of a mechanical fall on the stairs.  Then in the hospital spiked to 102.9, blood cx with MRSA  LE CT with lucency at the acetabular screw and prox femur, fluid collection in lateral soft tissue of thigh, an other fluid collection next to vastus intermedius.  Will need biopsy or drainage of the fluid collection. Cultures now show MRSA in Blood  and Urine. Needed procedure to deterine if there is  MRSA IN THE FLUID COLLECTION IN THE LEG. as per ortho would like to take Patient to the OR for washout and possible removal of hardware       MEDICATIONS  (STANDING):  ALBUTerol/ipratropium for Nebulization 3 milliLiter(s) Nebulizer every 6 hours  aspirin enteric coated 81 milliGRAM(s) Oral daily  buDESOnide   0.5 milliGRAM(s) Respule 0.5 milliGRAM(s) Inhalation two times a day  chlorhexidine 4% Liquid 1 Application(s) Topical <User Schedule>  docusate sodium 100 milliGRAM(s) Oral two times a day  enoxaparin Injectable 40 milliGRAM(s) SubCutaneous daily  folic acid 1 milliGRAM(s) Oral daily  gabapentin 300 milliGRAM(s) Oral three times a day  nicotine - 21 mG/24Hr(s) Patch 1 patch Transdermal daily  oxyCODONE  ER Tablet 20 milliGRAM(s) Oral every 12 hours  pantoprazole    Tablet 40 milliGRAM(s) Oral before breakfast  polyethylene glycol 3350 17 Gram(s) Oral daily  QUEtiapine 150 milliGRAM(s) Oral at bedtime  senna 2 Tablet(s) Oral at bedtime  thiamine 100 milliGRAM(s) Oral daily  traMADol 50 milliGRAM(s) Oral every 6 hours  vancomycin  IVPB 1000 milliGRAM(s) IV Intermittent every 12 hours    MEDICATIONS  (PRN):  acetaminophen   Tablet 650 milliGRAM(s) Oral every 6 hours PRN For Temp greater than 38.5 C (101.3 F)  acetaminophen   Tablet. 650 milliGRAM(s) Oral every 6 hours PRN Mild Pain (1 - 3)  HYDROmorphone   Tablet 4 milliGRAM(s) Oral every 3 hours PRN Moderate Pain (4 - 6)  HYDROmorphone   Tablet 8 milliGRAM(s) Oral every 3 hours PRN Severe Pain (7 - 10)  QUEtiapine 50 milliGRAM(s) Oral every 6 hours PRN anxiety/insomnia          VITALS:   T(C): 36.2 (08-27-18 @ 21:16), Max: 37.4 (08-27-18 @ 05:14)  HR: 78 (08-27-18 @ 21:16) (78 - 81)  BP: 147/69 (08-27-18 @ 21:16) (128/85 - 147/69)  RR: 17 (08-27-18 @ 21:16) (17 - 18)  SpO2: 95% (08-27-18 @ 21:16) (95% - 96%)  Wt(kg): --      PHYSICAL EXAM:  GENERAL: NAD, well nourished and conversant  HEAD:  Atraumatic  EYES: EOM, PERRLA, conjunctiva pink and sclera white  ENT: No tonsillar erythema, exudates, or enlargement, moist mucous membranes, good dentition, no lesions  NECK: Supple, No JVD, normal thyroid, carotids with normal upstrokes and no bruits  CHEST/LUNG: soft rales at the left base  HEART: Regular rate and rhythm, No murmurs, rubs, or gallops  ABDOMEN: Soft, nondistended, no masses, guarding, tenderness or rebound, bowel sounds present  EXTREMITIES:  2+ Peripheral Pulses, No clubbing, cyanosis, or edema. (+) right periprosthetic fracture site draiign  LYMPH: No lymphadenopathy noted  SKIN: No rashes or lesions  NERVOUS SYSTEM:  Alert & Oriented X3, normal cognitive function. Motor Strength 5/5 right upper and right lower.  5/5 left upper and left lower extremities, DTRs 2+ intact and symmetric  LABS:        CBC Full  -  ( 27 Aug 2018 10:00 )  WBC Count : 8.5 K/uL  Hemoglobin : 9.9 g/dL  Hematocrit : 31.7 %  Platelet Count - Automated : 291 K/uL  Mean Cell Volume : 91.0 fl  Mean Cell Hemoglobin : 28.3 pg  Mean Cell Hemoglobin Concentration : 31.1 gm/dL  Auto Neutrophil # : x  Auto Lymphocyte # : x  Auto Monocyte # : x  Auto Eosinophil # : x  Auto Basophil # : x  Auto Neutrophil % : x  Auto Lymphocyte % : x  Auto Monocyte % : x  Auto Eosinophil % : x  Auto Basophil % : x    08-27    131<L>  |  95<L>  |  36<H>  ----------------------------<  138<H>  4.5   |  23  |  1.02    Ca    9.0      27 Aug 2018 10:00  Mg     2.1     08-27            CAPILLARY BLOOD GLUCOSE          RADIOLOGY & ADDITIONAL TESTS: 73 yo F, w/ PMH of emphysema, CAD, HTN, revision USAMA with ORIF for periprosthetic R femur fracture on 6/30/18 by Dr. Be, discharged on 7/3/18 to [facility] rehab, readmitted from 07/16 - 07/24/18 w/ worsening R hip pain and decreased ability to ambulate, now s/p right femur vancouver B1 periprosthetic fracture ORIF by Dr. Cornell on 07/19/18, BIBEMS s/p mechanical fall on stairs during the night c/o R hip and R leg pain, redness, swelling, stiffness. Patient denies other complaints. Denies headache, neck stiffness, fever/chills, vision change, chest pain, shortness of breath, difficulty breathing, palpitations, weakness, dizziness, nausea, vomiting, diarrhea, syncope.   From prior, note, patient had initial right hip fracture surgery at [facility] with a hip pin placement in 2015.  She developed avascular necrosis, necessitating a right total hip replacement at [facility] in 2017.  She was well for one year and had an accidental fall taking out her garbage June, 2018. She was then diagnosed with a periprosthetic fracture. Repair consisted of lengthening the right total hip replacement and then stabilization with the distal femur.  At rehabilitation, she had a nontraumatic separation of this distal stabilization which required a periprosthetic revision. Patients/p revision of right hip surgery as of 07/19/18. Patient present with fever and unclrear etiology.  She presented to the ED on 8/20 with R hip and thigh pain, swelling, stiffness, and redness in the setting of a mechanical fall on the stairs.  Then in the hospital spiked to 102.9, blood cx with MRSA  LE CT with lucency at the acetabular screw and prox femur, fluid collection in lateral soft tissue of thigh, an other fluid collection next to vastus intermedius.  Will need biopsy or drainage of the fluid collection. Cultures now show MRSA in Blood  and Urine. Needed procedure to deterine if there is  MRSA IN THE FLUID COLLECTION IN THE LEG. as per ortho would like to take Patient to the OR for washout and possible removal of hardware     MEDICATIONS  (STANDING):  ALBUTerol/ipratropium for Nebulization 3 milliLiter(s) Nebulizer every 6 hours  aspirin enteric coated 81 milliGRAM(s) Oral daily  buDESOnide   0.5 milliGRAM(s) Respule 0.5 milliGRAM(s) Inhalation two times a day  chlorhexidine 4% Liquid 1 Application(s) Topical <User Schedule>  docusate sodium 100 milliGRAM(s) Oral two times a day  folic acid 1 milliGRAM(s) Oral daily  gabapentin 300 milliGRAM(s) Oral three times a day  nicotine - 21 mG/24Hr(s) Patch 1 patch Transdermal daily  oxyCODONE  ER Tablet 20 milliGRAM(s) Oral every 12 hours  pantoprazole    Tablet 40 milliGRAM(s) Oral before breakfast  polyethylene glycol 3350 17 Gram(s) Oral daily  QUEtiapine 150 milliGRAM(s) Oral at bedtime  senna 2 Tablet(s) Oral at bedtime  thiamine 100 milliGRAM(s) Oral daily  traMADol 50 milliGRAM(s) Oral every 6 hours  vancomycin  IVPB 1000 milliGRAM(s) IV Intermittent every 12 hours    MEDICATIONS  (PRN):  acetaminophen   Tablet 650 milliGRAM(s) Oral every 6 hours PRN For Temp greater than 38.5 C (101.3 F)  acetaminophen   Tablet. 650 milliGRAM(s) Oral every 6 hours PRN Mild Pain (1 - 3)  HYDROmorphone   Tablet 4 milliGRAM(s) Oral every 3 hours PRN Moderate Pain (4 - 6)  HYDROmorphone   Tablet 8 milliGRAM(s) Oral every 3 hours PRN Severe Pain (7 - 10)  QUEtiapine 50 milliGRAM(s) Oral every 6 hours PRN anxiety/insomnia      Vital Signs Last 24 Hrs  T(C): 36.7 (28 Aug 2018 07:41), Max: 37.3 (28 Aug 2018 05:03)  T(F): 98 (28 Aug 2018 07:41), Max: 99.2 (28 Aug 2018 05:03)  HR: 75 (28 Aug 2018 07:41) (75 - 78)  BP: 117/63 (28 Aug 2018 07:41) (117/63 - 147/69)  BP(mean): --  RR: 16 (28 Aug 2018 07:41) (16 - 17)  SpO2: 95% (28 Aug 2018 07:41) (95% - 95%)    PHYSICAL EXAM:  GENERAL: NAD, well nourished and conversant  HEAD:  Atraumatic  EYES: EOM, PERRLA, conjunctiva pink and sclera white  ENT: No tonsillar erythema, exudates, or enlargement, moist mucous membranes, good dentition, no lesions  NECK: Supple, No JVD, normal thyroid, carotids with normal upstrokes and no bruits  CHEST/LUNG: soft rales at the left base  HEART: Regular rate and rhythm, No murmurs, rubs, or gallops  ABDOMEN: Soft, nondistended, no masses, guarding, tenderness or rebound, bowel sounds present  EXTREMITIES:  2+ Peripheral Pulses, No clubbing, cyanosis, or edema. (+) right periprosthetic fracture site draiign  LYMPH: No lymphadenopathy noted  SKIN: No rashes or lesions  NERVOUS SYSTEM:  Alert & Oriented X3, normal cognitive function. Motor Strength 5/5 right upper and right lower.  5/5 left upper and left lower extremities, DTRs 2+ intact and symmetric  LABS:             CBC Full  -  ( 27 Aug 2018 10:00 )  WBC Count : 8.5 K/uL  Hemoglobin : 9.9 g/dL  Hematocrit : 31.7 %  Platelet Count - Automated : 291 K/uL  Mean Cell Volume : 91.0 fl  Mean Cell Hemoglobin : 28.3 pg  Mean Cell Hemoglobin Concentration : 31.1 gm/dL  Auto Neutrophil # : x  Auto Lymphocyte # : x  Auto Monocyte # : x  Auto Eosinophil # : x  Auto Basophil # : x  Auto Neutrophil % : x  Auto Lymphocyte % : x  Auto Monocyte % : x  Auto Eosinophil % : x  Auto Basophil % : x    08-27    131<L>  |  95<L>  |  36<H>  ----------------------------<  138<H>  4.5   |  23  |  1.02    Ca    9.0      27 Aug 2018 10:00  Mg     2.1     08-27                RADIOLOGY & ADDITIONAL TESTS: 73 yo F, w/ PMH of emphysema, CAD, HTN, revision USAMA with ORIF for periprosthetic R femur fracture on 6/30/18 by Dr. Be, discharged on 7/3/18 to [facility] rehab, readmitted from 07/16/07/24/18 w/ worsening R hip pain and decreased ability to ambulate, now s/p right femur vancouver B1 periprosthetic fracture ORIF by Dr. Cornell on 07/19/18, BIBEMS s/p mechanical fall on stairs during the night c/o R hip and R leg pain, redness, swelling, stiffness. Patient denies other complaints. Denies headache, neck stiffness, fever/chills, vision change, chest pain, shortness of breath, difficulty breathing, palpitations, weakness, dizziness, nausea, vomiting, diarrhea, syncope.   From prior, note, patient had initial right hip fracture surgery at [facility] with a hip pin placement in 2015.  She developed avascular necrosis, necessitating a right total hip replacement at [facility] in 2017.  She was well for one year and had an accidental fall taking out her garbage June, 2018. She was then diagnosed with a periprosthetic fracture. Repair consisted of lengthening the right total hip replacement and then stabilization with the distal femur.  At rehabilitation, she had a nontraumatic separation of this distal stabilization which required a periprosthetic revision. Patients/p revision of right hip surgery as of 07/19/18. Patient present with fever and unclrear etiology.  She presented to the ED on 8/20 with R hip and thigh pain, swelling, stiffness, and redness in the setting of a mechanical fall on the stairs.  Then in the hospital spiked to 102.9, blood cx with MRSA  LE CT with lucency at the acetabular screw and prox femur, fluid collection in lateral soft tissue of thigh, an other fluid collection next to vastus intermedius.  Will need biopsy or drainage of the fluid collection. Cultures now show MRSA in Blood  and Urine. Needed procedure to deterine if there is  MRSA IN THE FLUID COLLECTION IN THE LEG. as per ortho would like to take Patient to the OR for washout and possible removal of hardware on 08/29/2018    MEDICATIONS  (STANDING):  ALBUTerol/ipratropium for Nebulization 3 milliLiter(s) Nebulizer every 6 hours  aspirin enteric coated 81 milliGRAM(s) Oral daily  buDESOnide   0.5 milliGRAM(s) Respule 0.5 milliGRAM(s) Inhalation two times a day  chlorhexidine 4% Liquid 1 Application(s) Topical <User Schedule>  docusate sodium 100 milliGRAM(s) Oral two times a day  folic acid 1 milliGRAM(s) Oral daily  gabapentin 300 milliGRAM(s) Oral three times a day  nicotine - 21 mG/24Hr(s) Patch 1 patch Transdermal daily  oxyCODONE  ER Tablet 20 milliGRAM(s) Oral every 12 hours  pantoprazole    Tablet 40 milliGRAM(s) Oral before breakfast  polyethylene glycol 3350 17 Gram(s) Oral daily  QUEtiapine 150 milliGRAM(s) Oral at bedtime  senna 2 Tablet(s) Oral at bedtime  thiamine 100 milliGRAM(s) Oral daily  traMADol 50 milliGRAM(s) Oral every 6 hours  vancomycin  IVPB 1000 milliGRAM(s) IV Intermittent every 12 hours    MEDICATIONS  (PRN):  acetaminophen   Tablet 650 milliGRAM(s) Oral every 6 hours PRN For Temp greater than 38.5 C (101.3 F)  acetaminophen   Tablet. 650 milliGRAM(s) Oral every 6 hours PRN Mild Pain (1 - 3)  HYDROmorphone   Tablet 4 milliGRAM(s) Oral every 3 hours PRN Moderate Pain (4 - 6)  HYDROmorphone   Tablet 8 milliGRAM(s) Oral every 3 hours PRN Severe Pain (7 - 10)  QUEtiapine 50 milliGRAM(s) Oral every 6 hours PRN anxiety/insomnia      Vital Signs Last 24 Hrs  T(C): 36.7 (28 Aug 2018 07:41), Max: 37.3 (28 Aug 2018 05:03)  T(F): 98 (28 Aug 2018 07:41), Max: 99.2 (28 Aug 2018 05:03)  HR: 75 (28 Aug 2018 07:41) (75 - 78)  BP: 117/63 (28 Aug 2018 07:41) (117/63 - 147/69)  BP(mean): --  RR: 16 (28 Aug 2018 07:41) (16 - 17)  SpO2: 95% (28 Aug 2018 07:41) (95% - 95%)    PHYSICAL EXAM:  GENERAL: NAD, well nourished and conversant  HEAD:  Atraumatic  EYES: EOM, PERRLA, conjunctiva pink and sclera white  ENT: No tonsillar erythema, exudates, or enlargement, moist mucous membranes, good dentition, no lesions  NECK: Supple, No JVD, normal thyroid, carotids with normal upstrokes and no bruits  CHEST/LUNG: soft rales at the left base  HEART: Regular rate and rhythm, No murmurs, rubs, or gallops  ABDOMEN: Soft, nondistended, no masses, guarding, tenderness or rebound, bowel sounds present  EXTREMITIES:  2+ Peripheral Pulses, No clubbing, cyanosis, or edema. (+) right periprosthetic fracture site edema  LYMPH: No lymphadenopathy noted  SKIN: No rashes or lesions  NERVOUS SYSTEM:  Alert & Oriented X3, normal cognitive function. Motor Strength 5/5 right upper and right lower.  5/5 left upper and left lower extremities, DTRs 2+ intact and symmetric  LABS:             CBC Full  -  ( 27 Aug 2018 10:00 )  WBC Count : 8.5 K/uL  Hemoglobin : 9.9 g/dL  Hematocrit : 31.7 %  Platelet Count - Automated : 291 K/uL  Mean Cell Volume : 91.0 fl  Mean Cell Hemoglobin : 28.3 pg  Mean Cell Hemoglobin Concentration : 31.1 gm/dL  Auto Neutrophil # : x  Auto Lymphocyte # : x  Auto Monocyte # : x  Auto Eosinophil # : x  Auto Basophil # : x  Auto Neutrophil % : x  Auto Lymphocyte % : x  Auto Monocyte % : x  Auto Eosinophil % : x  Auto Basophil % : x    08-27    131<L>  |  95<L>  |  36<H>  ----------------------------<  138<H>  4.5   |  23  |  1.02    Ca    9.0      27 Aug 2018 10:00  Mg     2.1     08-27                RADIOLOGY & ADDITIONAL TESTS: 71 yo F, w/ PMH of emphysema, CAD, HTN, revision USAMA with ORIF for periprosthetic R femur fracture on 6/30/18 by Dr. Be, discharged on 7/3/18 to [facility] rehab, readmitted from 07/16/07/24/18 w/ worsening R hip pain and decreased ability to ambulate, now s/p right femur vancouver B1 periprosthetic fracture ORIF by Dr. Cornell on 07/19/18, BIBEMS s/p mechanical fall on stairs during the night c/o R hip and R leg pain, redness, swelling, stiffness. Patient denies other complaints. Denies headache, neck stiffness, fever/chills, vision change, chest pain, shortness of breath, difficulty breathing, palpitations, weakness, dizziness, nausea, vomiting, diarrhea, syncope.   From prior, note, patient had initial right hip fracture surgery at [facility] with a hip pin placement in 2015.  She developed avascular necrosis, necessitating a right total hip replacement at [facility] in 2017.  She was well for one year and had an accidental fall taking out her garbage June, 2018. She was then diagnosed with a periprosthetic fracture. Repair consisted of lengthening the right total hip replacement and then stabilization with the distal femur.  At rehabilitation, she had a nontraumatic separation of this distal stabilization which required a periprosthetic revision. Patients/p revision of right hip surgery as of 07/19/18. Patient present with fever and unclrear etiology.  She presented to the ED on 8/20 with R hip and thigh pain, swelling, stiffness, and redness in the setting of a mechanical fall on the stairs.  Then in the hospital spiked to 102.9, blood cx with MRSA  LE CT with lucency at the acetabular screw and prox femur, fluid collection in lateral soft tissue of thigh, an other fluid collection next to vastus intermedius.  Will need biopsy or drainage of the fluid collection. Cultures now show MRSA in Blood  and Urine. Needed procedure to deterine if there is  MRSA IN THE FLUID COLLECTION IN THE LEG. as per ortho would like to take Patient to the OR for washout and possible removal of hardware on 08/29/2018    MEDICATIONS  (STANDING):  ALBUTerol/ipratropium for Nebulization 3 milliLiter(s) Nebulizer every 6 hours  aspirin enteric coated 81 milliGRAM(s) Oral daily  buDESOnide   0.5 milliGRAM(s) Respule 0.5 milliGRAM(s) Inhalation two times a day  chlorhexidine 4% Liquid 1 Application(s) Topical <User Schedule>  docusate sodium 100 milliGRAM(s) Oral two times a day  folic acid 1 milliGRAM(s) Oral daily  gabapentin 300 milliGRAM(s) Oral three times a day  nicotine - 21 mG/24Hr(s) Patch 1 patch Transdermal daily  oxyCODONE  ER Tablet 20 milliGRAM(s) Oral every 12 hours  pantoprazole    Tablet 40 milliGRAM(s) Oral before breakfast  polyethylene glycol 3350 17 Gram(s) Oral daily  QUEtiapine 150 milliGRAM(s) Oral at bedtime  senna 2 Tablet(s) Oral at bedtime  thiamine 100 milliGRAM(s) Oral daily  traMADol 50 milliGRAM(s) Oral every 6 hours  vancomycin  IVPB 1000 milliGRAM(s) IV Intermittent every 12 hours    MEDICATIONS  (PRN):  acetaminophen   Tablet 650 milliGRAM(s) Oral every 6 hours PRN For Temp greater than 38.5 C (101.3 F)  acetaminophen   Tablet. 650 milliGRAM(s) Oral every 6 hours PRN Mild Pain (1 - 3)  HYDROmorphone   Tablet 4 milliGRAM(s) Oral every 3 hours PRN Moderate Pain (4 - 6)  HYDROmorphone   Tablet 8 milliGRAM(s) Oral every 3 hours PRN Severe Pain (7 - 10)  QUEtiapine 50 milliGRAM(s) Oral every 6 hours PRN anxiety/insomnia      Vital Signs Last 24 Hrs  T(C): 36.7 (28 Aug 2018 07:41), Max: 37.3 (28 Aug 2018 05:03)  T(F): 98 (28 Aug 2018 07:41), Max: 99.2 (28 Aug 2018 05:03)  HR: 75 (28 Aug 2018 07:41) (75 - 78)  BP: 117/63 (28 Aug 2018 07:41) (117/63 - 147/69)  BP(mean): --  RR: 16 (28 Aug 2018 07:41) (16 - 17)  SpO2: 95% (28 Aug 2018 07:41) (95% - 95%)    PHYSICAL EXAM:  GENERAL: NAD, well nourished and conversant  HEAD:  Atraumatic  EYES: EOM, PERRLA, conjunctiva pink and sclera white  ENT: No tonsillar erythema, exudates, or enlargement, moist mucous membranes, good dentition, no lesions  NECK: Supple, No JVD, normal thyroid, carotids with normal upstrokes and no bruits  CHEST/LUNG: soft rales at the left base  HEART: Regular rate and rhythm, No murmurs, rubs, or gallops  ABDOMEN: Soft, nondistended, no masses, guarding, tenderness or rebound, bowel sounds present  EXTREMITIES:  2+ Peripheral Pulses, No clubbing, cyanosis, or edema. (+) right periprosthetic fracture site edema  LYMPH: No lymphadenopathy noted  SKIN: No rashes or lesions  NERVOUS SYSTEM:  Alert & Oriented X3, normal cognitive function. Motor Strength 5/5 right upper and right lower.  5/5 left upper and left lower extremities, DTRs 2+ intact and symmetric  LABS:             CBC Full  -  ( 27 Aug 2018 10:00 )  WBC Count : 8.5 K/uL  Hemoglobin : 9.9 g/dL  Hematocrit : 31.7 %  Platelet Count - Automated : 291 K/uL  Mean Cell Volume : 91.0 fl  Mean Cell Hemoglobin : 28.3 pg  Mean Cell Hemoglobin Concentration : 31.1 gm/dL  Auto Neutrophil # : x  Auto Lymphocyte # : x  Auto Monocyte # : x  Auto Eosinophil # : x  Auto Basophil # : x  Auto Neutrophil % : x  Auto Lymphocyte % : x  Auto Monocyte % : x  Auto Eosinophil % : x  Auto Basophil % : x    08-27    131<L>  |  95<L>  |  36<H>  ----------------------------<  138<H>  4.5   |  23  |  1.02    Ca    9.0      27 Aug 2018 10:00  Mg     2.1     08-27                RADIOLOGY & ADDITIONAL TESTS:

## 2018-08-28 NOTE — PROGRESS NOTE ADULT - ATTENDING COMMENTS
The patient is medically stable, medically optimized and has no medical contraindication to surgery tomorrow as required. Exam time 40 minutes including > than 50 % for bedside discussion and counseling. For loculated fluid drainage and possible hardware revision in the AM. The patient is medically stable, medically optimized and has no medical contraindication to surgery tomorrow as required. Exam time 40 minutes including > than 50 % for bedside discussion and counseling.

## 2018-08-28 NOTE — PROGRESS NOTE ADULT - ASSESSMENT
72F with s/p right vancouver B2 ORIF with PJI    Pending medical optimization, plan for OR tomorrow for I&D, please document preoperative clearance and get preop labs (cbc, bmp, coags, active type and screen), [id number], hold AC for OR tomorrow  Pain control  Discussed with Dr Be, who agrees with above  wbat     Ortho 133

## 2018-08-28 NOTE — PROGRESS NOTE ADULT - ASSESSMENT
Patient returns with right leg pain now found to have fever .  (+) MRSA bacteremia and (+)  urine cultures a well .  Patient draining from her per-prosthetic fracture site and IR aspiration of fluid collection was scheduled however patient refused and leg is oozing. Patient scheduled for washout and possible removal of hardware Patient returns with right leg pain now found to have fever .  (+) MRSA bacteremia and (+)  urine cultures a well .  Patient draining from her per-prosthetic fracture site and IR aspiration of fluid collection was scheduled however patient refused and leg is oozing. Patient scheduled for washout and possible removal of hardware on 08/29/18

## 2018-08-29 LAB
ALBUMIN SERPL ELPH-MCNC: 2.7 G/DL — LOW (ref 3.3–5)
ALP SERPL-CCNC: 130 U/L — HIGH (ref 40–120)
ALT FLD-CCNC: 27 U/L — SIGNIFICANT CHANGE UP (ref 10–45)
AMYLASE P1 CFR SERPL: 23 U/L — LOW (ref 25–125)
ANION GAP SERPL CALC-SCNC: 11 MMOL/L — SIGNIFICANT CHANGE UP (ref 5–17)
APTT BLD: 28.7 SEC — SIGNIFICANT CHANGE UP (ref 27.5–37.4)
AST SERPL-CCNC: 29 U/L — SIGNIFICANT CHANGE UP (ref 10–40)
BILIRUB SERPL-MCNC: 0.2 MG/DL — SIGNIFICANT CHANGE UP (ref 0.2–1.2)
BLD GP AB SCN SERPL QL: NEGATIVE — SIGNIFICANT CHANGE UP
BUN SERPL-MCNC: 35 MG/DL — HIGH (ref 7–23)
CALCIUM SERPL-MCNC: 8.3 MG/DL — LOW (ref 8.4–10.5)
CHLORIDE SERPL-SCNC: 99 MMOL/L — SIGNIFICANT CHANGE UP (ref 96–108)
CO2 SERPL-SCNC: 23 MMOL/L — SIGNIFICANT CHANGE UP (ref 22–31)
CREAT SERPL-MCNC: 0.86 MG/DL — SIGNIFICANT CHANGE UP (ref 0.5–1.3)
GLUCOSE SERPL-MCNC: 139 MG/DL — HIGH (ref 70–99)
HCT VFR BLD CALC: 28 % — LOW (ref 34.5–45)
HGB BLD-MCNC: 8.9 G/DL — LOW (ref 11.5–15.5)
INR BLD: 1.16 RATIO — SIGNIFICANT CHANGE UP (ref 0.88–1.16)
LIDOCAIN IGE QN: 6 U/L — LOW (ref 7–60)
MCHC RBC-ENTMCNC: 29.2 PG — SIGNIFICANT CHANGE UP (ref 27–34)
MCHC RBC-ENTMCNC: 31.8 GM/DL — LOW (ref 32–36)
MCV RBC AUTO: 91.8 FL — SIGNIFICANT CHANGE UP (ref 80–100)
PLATELET # BLD AUTO: 267 K/UL — SIGNIFICANT CHANGE UP (ref 150–400)
POTASSIUM SERPL-MCNC: 4.2 MMOL/L — SIGNIFICANT CHANGE UP (ref 3.5–5.3)
POTASSIUM SERPL-SCNC: 4.2 MMOL/L — SIGNIFICANT CHANGE UP (ref 3.5–5.3)
PROT SERPL-MCNC: 7 G/DL — SIGNIFICANT CHANGE UP (ref 6–8.3)
PROTHROM AB SERPL-ACNC: 13.2 SEC — HIGH (ref 10–13.1)
RBC # BLD: 3.05 M/UL — LOW (ref 3.8–5.2)
RBC # FLD: 15.7 % — HIGH (ref 10.3–14.5)
RH IG SCN BLD-IMP: POSITIVE — SIGNIFICANT CHANGE UP
SODIUM SERPL-SCNC: 133 MMOL/L — LOW (ref 135–145)
WBC # BLD: 6.31 K/UL — SIGNIFICANT CHANGE UP (ref 3.8–10.5)
WBC # FLD AUTO: 6.31 K/UL — SIGNIFICANT CHANGE UP (ref 3.8–10.5)

## 2018-08-29 PROCEDURE — 99233 SBSQ HOSP IP/OBS HIGH 50: CPT

## 2018-08-29 RX ORDER — ENOXAPARIN SODIUM 100 MG/ML
40 INJECTION SUBCUTANEOUS DAILY
Qty: 0 | Refills: 0 | Status: DISCONTINUED | OUTPATIENT
Start: 2018-08-30 | End: 2018-09-07

## 2018-08-29 RX ORDER — ONDANSETRON 8 MG/1
4 TABLET, FILM COATED ORAL ONCE
Qty: 0 | Refills: 0 | Status: DISCONTINUED | OUTPATIENT
Start: 2018-08-29 | End: 2018-08-29

## 2018-08-29 RX ORDER — SODIUM CHLORIDE 9 MG/ML
1000 INJECTION, SOLUTION INTRAVENOUS
Qty: 0 | Refills: 0 | Status: DISCONTINUED | OUTPATIENT
Start: 2018-08-29 | End: 2018-09-07

## 2018-08-29 RX ORDER — HYDROMORPHONE HYDROCHLORIDE 2 MG/ML
0.5 INJECTION INTRAMUSCULAR; INTRAVENOUS; SUBCUTANEOUS ONCE
Qty: 0 | Refills: 0 | Status: DISCONTINUED | OUTPATIENT
Start: 2018-08-29 | End: 2018-08-29

## 2018-08-29 RX ORDER — HYDROMORPHONE HYDROCHLORIDE 2 MG/ML
0.5 INJECTION INTRAMUSCULAR; INTRAVENOUS; SUBCUTANEOUS
Qty: 0 | Refills: 0 | Status: DISCONTINUED | OUTPATIENT
Start: 2018-08-29 | End: 2018-08-29

## 2018-08-29 RX ORDER — MORPHINE SULFATE 50 MG/1
4 CAPSULE, EXTENDED RELEASE ORAL
Qty: 0 | Refills: 0 | Status: DISCONTINUED | OUTPATIENT
Start: 2018-08-29 | End: 2018-08-29

## 2018-08-29 RX ORDER — SODIUM CHLORIDE 9 MG/ML
1000 INJECTION, SOLUTION INTRAVENOUS
Qty: 0 | Refills: 0 | Status: DISCONTINUED | OUTPATIENT
Start: 2018-08-29 | End: 2018-08-29

## 2018-08-29 RX ADMIN — TRAMADOL HYDROCHLORIDE 50 MILLIGRAM(S): 50 TABLET ORAL at 23:33

## 2018-08-29 RX ADMIN — HYDROMORPHONE HYDROCHLORIDE 8 MILLIGRAM(S): 2 INJECTION INTRAMUSCULAR; INTRAVENOUS; SUBCUTANEOUS at 09:08

## 2018-08-29 RX ADMIN — MORPHINE SULFATE 4 MILLIGRAM(S): 50 CAPSULE, EXTENDED RELEASE ORAL at 17:10

## 2018-08-29 RX ADMIN — TRAMADOL HYDROCHLORIDE 50 MILLIGRAM(S): 50 TABLET ORAL at 05:37

## 2018-08-29 RX ADMIN — HYDROMORPHONE HYDROCHLORIDE 0.5 MILLIGRAM(S): 2 INJECTION INTRAMUSCULAR; INTRAVENOUS; SUBCUTANEOUS at 16:18

## 2018-08-29 RX ADMIN — OXYCODONE HYDROCHLORIDE 20 MILLIGRAM(S): 5 TABLET ORAL at 17:53

## 2018-08-29 RX ADMIN — HYDROMORPHONE HYDROCHLORIDE 0.5 MILLIGRAM(S): 2 INJECTION INTRAMUSCULAR; INTRAVENOUS; SUBCUTANEOUS at 23:33

## 2018-08-29 RX ADMIN — Medication 3 MILLILITER(S): at 06:17

## 2018-08-29 RX ADMIN — HYDROMORPHONE HYDROCHLORIDE 8 MILLIGRAM(S): 2 INJECTION INTRAMUSCULAR; INTRAVENOUS; SUBCUTANEOUS at 12:15

## 2018-08-29 RX ADMIN — TRAMADOL HYDROCHLORIDE 50 MILLIGRAM(S): 50 TABLET ORAL at 06:12

## 2018-08-29 RX ADMIN — OXYCODONE HYDROCHLORIDE 20 MILLIGRAM(S): 5 TABLET ORAL at 05:37

## 2018-08-29 RX ADMIN — TRAMADOL HYDROCHLORIDE 50 MILLIGRAM(S): 50 TABLET ORAL at 17:52

## 2018-08-29 RX ADMIN — SODIUM CHLORIDE 75 MILLILITER(S): 9 INJECTION, SOLUTION INTRAVENOUS at 16:09

## 2018-08-29 RX ADMIN — HYDROMORPHONE HYDROCHLORIDE 8 MILLIGRAM(S): 2 INJECTION INTRAMUSCULAR; INTRAVENOUS; SUBCUTANEOUS at 17:51

## 2018-08-29 RX ADMIN — HYDROMORPHONE HYDROCHLORIDE 8 MILLIGRAM(S): 2 INJECTION INTRAMUSCULAR; INTRAVENOUS; SUBCUTANEOUS at 22:41

## 2018-08-29 RX ADMIN — TRAMADOL HYDROCHLORIDE 50 MILLIGRAM(S): 50 TABLET ORAL at 11:40

## 2018-08-29 RX ADMIN — QUETIAPINE FUMARATE 150 MILLIGRAM(S): 200 TABLET, FILM COATED ORAL at 22:41

## 2018-08-29 RX ADMIN — MORPHINE SULFATE 4 MILLIGRAM(S): 50 CAPSULE, EXTENDED RELEASE ORAL at 17:29

## 2018-08-29 RX ADMIN — Medication 1 MILLIGRAM(S): at 12:16

## 2018-08-29 RX ADMIN — TRAMADOL HYDROCHLORIDE 50 MILLIGRAM(S): 50 TABLET ORAL at 17:21

## 2018-08-29 RX ADMIN — Medication 0.5 MILLIGRAM(S): at 06:18

## 2018-08-29 RX ADMIN — HYDROMORPHONE HYDROCHLORIDE 8 MILLIGRAM(S): 2 INJECTION INTRAMUSCULAR; INTRAVENOUS; SUBCUTANEOUS at 06:12

## 2018-08-29 RX ADMIN — HYDROMORPHONE HYDROCHLORIDE 8 MILLIGRAM(S): 2 INJECTION INTRAMUSCULAR; INTRAVENOUS; SUBCUTANEOUS at 18:10

## 2018-08-29 RX ADMIN — Medication 81 MILLIGRAM(S): at 12:16

## 2018-08-29 RX ADMIN — HYDROMORPHONE HYDROCHLORIDE 4 MILLIGRAM(S): 2 INJECTION INTRAMUSCULAR; INTRAVENOUS; SUBCUTANEOUS at 11:40

## 2018-08-29 RX ADMIN — OXYCODONE HYDROCHLORIDE 20 MILLIGRAM(S): 5 TABLET ORAL at 17:21

## 2018-08-29 RX ADMIN — GABAPENTIN 300 MILLIGRAM(S): 400 CAPSULE ORAL at 21:06

## 2018-08-29 RX ADMIN — HYDROMORPHONE HYDROCHLORIDE 0.5 MILLIGRAM(S): 2 INJECTION INTRAMUSCULAR; INTRAVENOUS; SUBCUTANEOUS at 16:30

## 2018-08-29 RX ADMIN — GABAPENTIN 300 MILLIGRAM(S): 400 CAPSULE ORAL at 05:37

## 2018-08-29 RX ADMIN — HYDROMORPHONE HYDROCHLORIDE 8 MILLIGRAM(S): 2 INJECTION INTRAMUSCULAR; INTRAVENOUS; SUBCUTANEOUS at 04:41

## 2018-08-29 RX ADMIN — Medication 3 MILLILITER(S): at 23:35

## 2018-08-29 RX ADMIN — HYDROMORPHONE HYDROCHLORIDE 4 MILLIGRAM(S): 2 INJECTION INTRAMUSCULAR; INTRAVENOUS; SUBCUTANEOUS at 11:10

## 2018-08-29 RX ADMIN — MORPHINE SULFATE 4 MILLIGRAM(S): 50 CAPSULE, EXTENDED RELEASE ORAL at 16:58

## 2018-08-29 RX ADMIN — GABAPENTIN 300 MILLIGRAM(S): 400 CAPSULE ORAL at 12:15

## 2018-08-29 RX ADMIN — QUETIAPINE FUMARATE 50 MILLIGRAM(S): 200 TABLET, FILM COATED ORAL at 12:16

## 2018-08-29 RX ADMIN — CHLORHEXIDINE GLUCONATE 1 APPLICATION(S): 213 SOLUTION TOPICAL at 11:15

## 2018-08-29 RX ADMIN — MORPHINE SULFATE 4 MILLIGRAM(S): 50 CAPSULE, EXTENDED RELEASE ORAL at 17:14

## 2018-08-29 RX ADMIN — Medication 1 PATCH: at 12:12

## 2018-08-29 RX ADMIN — HYDROMORPHONE HYDROCHLORIDE 0.5 MILLIGRAM(S): 2 INJECTION INTRAMUSCULAR; INTRAVENOUS; SUBCUTANEOUS at 17:52

## 2018-08-29 RX ADMIN — HYDROMORPHONE HYDROCHLORIDE 0.5 MILLIGRAM(S): 2 INJECTION INTRAMUSCULAR; INTRAVENOUS; SUBCUTANEOUS at 16:38

## 2018-08-29 RX ADMIN — Medication 250 MILLIGRAM(S): at 21:06

## 2018-08-29 RX ADMIN — Medication 250 MILLIGRAM(S): at 09:09

## 2018-08-29 RX ADMIN — OXYCODONE HYDROCHLORIDE 20 MILLIGRAM(S): 5 TABLET ORAL at 06:12

## 2018-08-29 RX ADMIN — HYDROMORPHONE HYDROCHLORIDE 8 MILLIGRAM(S): 2 INJECTION INTRAMUSCULAR; INTRAVENOUS; SUBCUTANEOUS at 23:41

## 2018-08-29 RX ADMIN — TRAMADOL HYDROCHLORIDE 50 MILLIGRAM(S): 50 TABLET ORAL at 11:09

## 2018-08-29 RX ADMIN — HYDROMORPHONE HYDROCHLORIDE 0.5 MILLIGRAM(S): 2 INJECTION INTRAMUSCULAR; INTRAVENOUS; SUBCUTANEOUS at 18:10

## 2018-08-29 RX ADMIN — HYDROMORPHONE HYDROCHLORIDE 8 MILLIGRAM(S): 2 INJECTION INTRAMUSCULAR; INTRAVENOUS; SUBCUTANEOUS at 12:45

## 2018-08-29 RX ADMIN — SENNA PLUS 2 TABLET(S): 8.6 TABLET ORAL at 21:06

## 2018-08-29 RX ADMIN — HYDROMORPHONE HYDROCHLORIDE 8 MILLIGRAM(S): 2 INJECTION INTRAMUSCULAR; INTRAVENOUS; SUBCUTANEOUS at 09:38

## 2018-08-29 RX ADMIN — HYDROMORPHONE HYDROCHLORIDE 0.5 MILLIGRAM(S): 2 INJECTION INTRAMUSCULAR; INTRAVENOUS; SUBCUTANEOUS at 16:53

## 2018-08-29 NOTE — CHART NOTE - NSCHARTNOTEFT_GEN_A_CORE
Denies chest pain, SOB, N/V.    T(C): 36.4 (08-29-18 @ 18:00)  T(F): 97.5 (08-29-18 @ 18:00)  HR: 77 (08-29-18 @ 18:00)  BP: 128/56 (08-29-18 @ 18:00)  RR: 20 (08-29-18 @ 18:00)  SpO2: 95% (08-29-18 @ 18:00)      Exam:  Alert and oriented; No acute distress    R lower extremity:  Dsg CDI  Calf soft, non-tender  Motor/sensation grossly intact  Foot w/moderate non-pitting edema  +DP pulse    A/P: 72y Female s/p R thigh I&D; Stable  -Pain control  -DVT ppx; IS  -Am labs  -Continue current tx.    Haylee Strong PA-C  Orthopedic Surgery  Pagers [phone]

## 2018-08-29 NOTE — PROGRESS NOTE ADULT - ASSESSMENT
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 THR, kika-prosthetic R femur fx (s/p Total Hip revision with ORIF, 6/30/18), recent re-admission (07/16 - 07/24) for worsening R hip pain and decreased ability to ambulate now s/p R femur vancouver B1 periprosthetic fracture ORIF (07/19) who presented to the ED on 8/20 with R hip and thigh pain, swelling, stiffness, and redness in the setting of a mechanical fall on the stairs  in the hospital spiked to 102.9, blood cx with MRSA  LE CT with lucency at the acetabular screw and prox fem, fluid collection in lateral soft tissue of thigh, an other fluid collection next to vastus intermedius  MRI also showed Complex fluid collection along the anterolateral femur adjacent to the hardware and a subcutaneous collection along the proximal lateral right thigh.    high fever, MRSA bacteremia with hip hardware collection in view of complicated hip surgical history and recent ORIF  pt is going to OR today  I discussed with ortho regarding hardware removal but pt has a fresh fx and the hardware in the femur cannot be removed, washout and abscess drainage will done  monitoring drug levels     * f/u the OR cultures  * c/w vanco 1 g q 12, vanco trough 19  * check the level tomorrow  * can place a PICC line  * will need a long course and then chronic suppression if the hardware remains in, and she will have a high chance of recurrance  * TTE

## 2018-08-29 NOTE — PROGRESS NOTE ADULT - SUBJECTIVE AND OBJECTIVE BOX
Ortho Progress Note    S: Patient seen and examined. No acute events overnight. Pain well controlled with current regimen. Denies lightheadedness/dizziness, CP/SOB. Tolerating diet.       O:  Physical Exam:  Gen: Laying in bed, NAD, alert and oriented.   Resp: Unlabored breathing  Ext: EHL/FHL/TA/Sol intact          + SILT DP/SP/MCDANIEL/Sa          +DP, extremity WWP          incision intact, with diffuse thigh erythema and edema    Vital Signs Last 24 Hrs  T(C): 36.9 (29 Aug 2018 04:40), Max: 36.9 (29 Aug 2018 04:40)  T(F): 98.4 (29 Aug 2018 04:40), Max: 98.4 (29 Aug 2018 04:40)  HR: 80 (29 Aug 2018 04:40) (75 - 80)  BP: 126/64 (29 Aug 2018 04:40) (117/63 - 126/64)  BP(mean): --  RR: 18 (29 Aug 2018 04:40) (16 - 18)  SpO2: 96% (29 Aug 2018 04:40) (95% - 96%)

## 2018-08-29 NOTE — PROGRESS NOTE ADULT - SUBJECTIVE AND OBJECTIVE BOX
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    Interval History: pt stable and afebrile, repeat blood cx negative, going to OR today    ROS:      All other systems negative    Constitutional: no fever, no chills  Head: no trauma  Eyes: no vision changes, no eye pain  ENT:  no sore throat, no rhinorrhea  Cardiovascular:  no chest pain, no palpitation  Respiratory:  no SOB, no cough  GI:  no abd pain, no vomiting, no diarrhea  urinary: no dysuria, no hematuria, no flank pain  musculoskeletal:  right thigh and hip pain with edema, erythema  skin:  no rash  neurology:  no headache, no seizure, no change in mental status  psych: no anxiety, no depression       Allergies  No Known Allergies        ANTIMICROBIALS:  vancomycin  IVPB 1000 every 12 hours      OTHER MEDS:  acetaminophen   Tablet 650 milliGRAM(s) Oral every 6 hours PRN  acetaminophen   Tablet. 650 milliGRAM(s) Oral every 6 hours PRN  ALBUTerol/ipratropium for Nebulization 3 milliLiter(s) Nebulizer every 6 hours  aspirin enteric coated 81 milliGRAM(s) Oral daily  buDESOnide   0.5 milliGRAM(s) Respule 0.5 milliGRAM(s) Inhalation two times a day  chlorhexidine 4% Liquid 1 Application(s) Topical <User Schedule>  folic acid 1 milliGRAM(s) Oral daily  gabapentin 300 milliGRAM(s) Oral three times a day  HYDROmorphone   Tablet 4 milliGRAM(s) Oral every 3 hours PRN  HYDROmorphone   Tablet 8 milliGRAM(s) Oral every 3 hours PRN  nicotine - 21 mG/24Hr(s) Patch 1 patch Transdermal daily  oxyCODONE  ER Tablet 20 milliGRAM(s) Oral every 12 hours  pantoprazole    Tablet 40 milliGRAM(s) Oral before breakfast  polyethylene glycol 3350 17 Gram(s) Oral daily  QUEtiapine 50 milliGRAM(s) Oral every 6 hours PRN  QUEtiapine 150 milliGRAM(s) Oral at bedtime  senna 2 Tablet(s) Oral at bedtime  thiamine 100 milliGRAM(s) Oral daily  traMADol 50 milliGRAM(s) Oral every 6 hours      Vital Signs Last 24 Hrs  T(C): 36.9 (29 Aug 2018 04:40), Max: 36.9 (29 Aug 2018 04:40)  T(F): 98.4 (29 Aug 2018 04:40), Max: 98.4 (29 Aug 2018 04:40)  HR: 80 (29 Aug 2018 04:40) (76 - 80)  BP: 126/64 (29 Aug 2018 04:40) (120/62 - 126/64)  BP(mean): --  RR: 18 (29 Aug 2018 04:40) (18 - 18)  SpO2: 96% (29 Aug 2018 04:40) (95% - 96%)    Physical Exam:  General:    NAD,  non toxic, A&O x 3  Head: atraumatic, normocephalic  Eye: normal sclera and conjunctiva  ENT:    no oropharyngeal lesions,   no LAD,   neck supple  Cardio:     regular S1, S2,  no murmur  Respiratory:    clear b/l,    no wheezing  abd:     soft,   BS +,   no tenderness,    no organomegaly  :   no CVAT,  no suprapubic tenderness,   no  edmondson  Musculoskeletal:   R thigh erythema, tense edema and warmth around the incision, some tenderness  vascular: no lines, normal pulses  Skin:    no rash  Neurologic:     no focal deficit  psych: pt paranoid but today was more cooperative                          8.9    6.31  )-----------( 267      ( 29 Aug 2018 09:09 )             28.0       08-29    133<L>  |  99  |  35<H>  ----------------------------<  139<H>  4.2   |  23  |  0.86    Ca    8.3<L>      29 Aug 2018 07:20    TPro  7.0  /  Alb  2.7<L>  /  TBili  0.2  /  DBili  x   /  AST  29  /  ALT  27  /  AlkPhos  130<H>  08-29          MICROBIOLOGY:  Vancomycin Level, Trough: 17.9 ug/mL (08-28-18 @ 21:38)  v  .Blood Blood-Peripheral  08-23-18   No growth at 5 days.  --  --      .Urine Clean Catch (Midstream)  08-23-18   50,000 - 99,000 CFU/mL Methicillin resistant Staphylococcus aureus  --  Methicillin resistant Staphylococcus aureus      .Blood Blood  08-23-18   Growth in aerobic bottle: Coag Negative Staphylococcus  Single set isolate, possible contaminant. Contact  Microbiology if susceptibility testing clinically  indicated.  --    Growth in aerobic bottle: Gram Positive Cocci in Clusters      .Blood Blood  08-22-18   Growth in aerobic bottle: Methicillin resistant Staphylococcus aureus        .Urine Catheterized  08-20-18   No growth  --  --                RADIOLOGY:  Images below reviewed personally  < from: MR Femur No Cont, Right (08.24.18 @ 22:08) >  Status post right hip revision with susceptibility artifact.  Complex fluid collection along the anterolateral femur adjacent to the   hardware, which may represent a hematoma and/or infection. Additional   subcutaneous collection along the proximal lateral right thigh. Bilateral   knee joint effusions.

## 2018-08-29 NOTE — PROGRESS NOTE ADULT - ASSESSMENT
72F with s/p right vancouver B2 ORIF with PJI    Pending medical optimization, plan for OR today for I&D,  Pain control  Discussed with Dr Be, who agrees with above  wbat     Ortho [id number]

## 2018-08-29 NOTE — BRIEF OPERATIVE NOTE - PROCEDURE
<<-----Click on this checkbox to enter Procedure Complex irrigation and debridement  08/29/2018  right thigh hardware  Active  JOSEPHTROM

## 2018-08-30 LAB
ANION GAP SERPL CALC-SCNC: 11 MMOL/L — SIGNIFICANT CHANGE UP (ref 5–17)
BUN SERPL-MCNC: 25 MG/DL — HIGH (ref 7–23)
CALCIUM SERPL-MCNC: 9.1 MG/DL — SIGNIFICANT CHANGE UP (ref 8.4–10.5)
CHLORIDE SERPL-SCNC: 100 MMOL/L — SIGNIFICANT CHANGE UP (ref 96–108)
CO2 SERPL-SCNC: 24 MMOL/L — SIGNIFICANT CHANGE UP (ref 22–31)
CREAT SERPL-MCNC: 0.87 MG/DL — SIGNIFICANT CHANGE UP (ref 0.5–1.3)
GLUCOSE SERPL-MCNC: 97 MG/DL — SIGNIFICANT CHANGE UP (ref 70–99)
HCT VFR BLD CALC: 18.7 % — CRITICAL LOW (ref 34.5–45)
HCT VFR BLD CALC: 19.9 % — CRITICAL LOW (ref 34.5–45)
HCT VFR BLD CALC: 25.5 % — LOW (ref 34.5–45)
HGB BLD-MCNC: 5.9 G/DL — CRITICAL LOW (ref 11.5–15.5)
HGB BLD-MCNC: 6.2 G/DL — CRITICAL LOW (ref 11.5–15.5)
HGB BLD-MCNC: 8.1 G/DL — LOW (ref 11.5–15.5)
MCHC RBC-ENTMCNC: 26.5 PG — LOW (ref 27–34)
MCHC RBC-ENTMCNC: 27.9 PG — SIGNIFICANT CHANGE UP (ref 27–34)
MCHC RBC-ENTMCNC: 28.2 PG — SIGNIFICANT CHANGE UP (ref 27–34)
MCHC RBC-ENTMCNC: 31.1 GM/DL — LOW (ref 32–36)
MCHC RBC-ENTMCNC: 31.6 GM/DL — LOW (ref 32–36)
MCHC RBC-ENTMCNC: 31.8 GM/DL — LOW (ref 32–36)
MCV RBC AUTO: 83.3 FL — SIGNIFICANT CHANGE UP (ref 80–100)
MCV RBC AUTO: 89.5 FL — SIGNIFICANT CHANGE UP (ref 80–100)
MCV RBC AUTO: 89.9 FL — SIGNIFICANT CHANGE UP (ref 80–100)
PLATELET # BLD AUTO: 249 K/UL — SIGNIFICANT CHANGE UP (ref 150–400)
PLATELET # BLD AUTO: 330 K/UL — SIGNIFICANT CHANGE UP (ref 150–400)
PLATELET # BLD AUTO: 336 K/UL — SIGNIFICANT CHANGE UP (ref 150–400)
POTASSIUM SERPL-MCNC: 4.3 MMOL/L — SIGNIFICANT CHANGE UP (ref 3.5–5.3)
POTASSIUM SERPL-SCNC: 4.3 MMOL/L — SIGNIFICANT CHANGE UP (ref 3.5–5.3)
RBC # BLD: 2.09 M/UL — LOW (ref 3.8–5.2)
RBC # BLD: 2.22 M/UL — LOW (ref 3.8–5.2)
RBC # BLD: 3.06 M/UL — LOW (ref 3.8–5.2)
RBC # FLD: 14.5 % — SIGNIFICANT CHANGE UP (ref 10.3–14.5)
RBC # FLD: 16 % — HIGH (ref 10.3–14.5)
RBC # FLD: 20 % — HIGH (ref 10.3–14.5)
SODIUM SERPL-SCNC: 135 MMOL/L — SIGNIFICANT CHANGE UP (ref 135–145)
VANCOMYCIN TROUGH SERPL-MCNC: 19.9 UG/ML — SIGNIFICANT CHANGE UP (ref 10–20)
WBC # BLD: 4.51 K/UL — SIGNIFICANT CHANGE UP (ref 3.8–10.5)
WBC # BLD: 7.5 K/UL — SIGNIFICANT CHANGE UP (ref 3.8–10.5)
WBC # BLD: 8.3 K/UL — SIGNIFICANT CHANGE UP (ref 3.8–10.5)
WBC # FLD AUTO: 4.51 K/UL — SIGNIFICANT CHANGE UP (ref 3.8–10.5)
WBC # FLD AUTO: 7.5 K/UL — SIGNIFICANT CHANGE UP (ref 3.8–10.5)
WBC # FLD AUTO: 8.3 K/UL — SIGNIFICANT CHANGE UP (ref 3.8–10.5)

## 2018-08-30 PROCEDURE — 99233 SBSQ HOSP IP/OBS HIGH 50: CPT

## 2018-08-30 RX ADMIN — GABAPENTIN 300 MILLIGRAM(S): 400 CAPSULE ORAL at 05:16

## 2018-08-30 RX ADMIN — QUETIAPINE FUMARATE 150 MILLIGRAM(S): 200 TABLET, FILM COATED ORAL at 21:27

## 2018-08-30 RX ADMIN — OXYCODONE HYDROCHLORIDE 20 MILLIGRAM(S): 5 TABLET ORAL at 05:17

## 2018-08-30 RX ADMIN — OXYCODONE HYDROCHLORIDE 20 MILLIGRAM(S): 5 TABLET ORAL at 17:42

## 2018-08-30 RX ADMIN — TRAMADOL HYDROCHLORIDE 50 MILLIGRAM(S): 50 TABLET ORAL at 00:01

## 2018-08-30 RX ADMIN — TRAMADOL HYDROCHLORIDE 50 MILLIGRAM(S): 50 TABLET ORAL at 11:05

## 2018-08-30 RX ADMIN — HYDROMORPHONE HYDROCHLORIDE 8 MILLIGRAM(S): 2 INJECTION INTRAMUSCULAR; INTRAVENOUS; SUBCUTANEOUS at 11:04

## 2018-08-30 RX ADMIN — Medication 100 MILLIGRAM(S): at 11:10

## 2018-08-30 RX ADMIN — TRAMADOL HYDROCHLORIDE 50 MILLIGRAM(S): 50 TABLET ORAL at 17:42

## 2018-08-30 RX ADMIN — OXYCODONE HYDROCHLORIDE 20 MILLIGRAM(S): 5 TABLET ORAL at 18:12

## 2018-08-30 RX ADMIN — QUETIAPINE FUMARATE 50 MILLIGRAM(S): 200 TABLET, FILM COATED ORAL at 17:50

## 2018-08-30 RX ADMIN — Medication 81 MILLIGRAM(S): at 11:10

## 2018-08-30 RX ADMIN — GABAPENTIN 300 MILLIGRAM(S): 400 CAPSULE ORAL at 11:11

## 2018-08-30 RX ADMIN — PANTOPRAZOLE SODIUM 40 MILLIGRAM(S): 20 TABLET, DELAYED RELEASE ORAL at 05:16

## 2018-08-30 RX ADMIN — Medication 250 MILLIGRAM(S): at 11:01

## 2018-08-30 RX ADMIN — HYDROMORPHONE HYDROCHLORIDE 4 MILLIGRAM(S): 2 INJECTION INTRAMUSCULAR; INTRAVENOUS; SUBCUTANEOUS at 19:08

## 2018-08-30 RX ADMIN — Medication 1 MILLIGRAM(S): at 11:03

## 2018-08-30 RX ADMIN — HYDROMORPHONE HYDROCHLORIDE 8 MILLIGRAM(S): 2 INJECTION INTRAMUSCULAR; INTRAVENOUS; SUBCUTANEOUS at 21:00

## 2018-08-30 RX ADMIN — CHLORHEXIDINE GLUCONATE 1 APPLICATION(S): 213 SOLUTION TOPICAL at 11:02

## 2018-08-30 RX ADMIN — Medication 0.5 MILLIGRAM(S): at 05:16

## 2018-08-30 RX ADMIN — QUETIAPINE FUMARATE 50 MILLIGRAM(S): 200 TABLET, FILM COATED ORAL at 11:03

## 2018-08-30 RX ADMIN — Medication 250 MILLIGRAM(S): at 22:11

## 2018-08-30 RX ADMIN — HYDROMORPHONE HYDROCHLORIDE 8 MILLIGRAM(S): 2 INJECTION INTRAMUSCULAR; INTRAVENOUS; SUBCUTANEOUS at 17:42

## 2018-08-30 RX ADMIN — HYDROMORPHONE HYDROCHLORIDE 8 MILLIGRAM(S): 2 INJECTION INTRAMUSCULAR; INTRAVENOUS; SUBCUTANEOUS at 18:12

## 2018-08-30 RX ADMIN — Medication 1 PATCH: at 11:02

## 2018-08-30 RX ADMIN — HYDROMORPHONE HYDROCHLORIDE 8 MILLIGRAM(S): 2 INJECTION INTRAMUSCULAR; INTRAVENOUS; SUBCUTANEOUS at 15:07

## 2018-08-30 RX ADMIN — Medication 3 MILLILITER(S): at 05:16

## 2018-08-30 RX ADMIN — TRAMADOL HYDROCHLORIDE 50 MILLIGRAM(S): 50 TABLET ORAL at 18:12

## 2018-08-30 RX ADMIN — HYDROMORPHONE HYDROCHLORIDE 8 MILLIGRAM(S): 2 INJECTION INTRAMUSCULAR; INTRAVENOUS; SUBCUTANEOUS at 08:17

## 2018-08-30 RX ADMIN — ENOXAPARIN SODIUM 40 MILLIGRAM(S): 100 INJECTION SUBCUTANEOUS at 11:02

## 2018-08-30 RX ADMIN — TRAMADOL HYDROCHLORIDE 50 MILLIGRAM(S): 50 TABLET ORAL at 11:34

## 2018-08-30 RX ADMIN — TRAMADOL HYDROCHLORIDE 50 MILLIGRAM(S): 50 TABLET ORAL at 23:40

## 2018-08-30 RX ADMIN — OXYCODONE HYDROCHLORIDE 20 MILLIGRAM(S): 5 TABLET ORAL at 06:30

## 2018-08-30 RX ADMIN — TRAMADOL HYDROCHLORIDE 50 MILLIGRAM(S): 50 TABLET ORAL at 05:17

## 2018-08-30 RX ADMIN — HYDROMORPHONE HYDROCHLORIDE 8 MILLIGRAM(S): 2 INJECTION INTRAMUSCULAR; INTRAVENOUS; SUBCUTANEOUS at 14:37

## 2018-08-30 RX ADMIN — Medication 3 MILLILITER(S): at 01:36

## 2018-08-30 RX ADMIN — HYDROMORPHONE HYDROCHLORIDE 4 MILLIGRAM(S): 2 INJECTION INTRAMUSCULAR; INTRAVENOUS; SUBCUTANEOUS at 19:38

## 2018-08-30 RX ADMIN — HYDROMORPHONE HYDROCHLORIDE 8 MILLIGRAM(S): 2 INJECTION INTRAMUSCULAR; INTRAVENOUS; SUBCUTANEOUS at 21:30

## 2018-08-30 RX ADMIN — HYDROMORPHONE HYDROCHLORIDE 8 MILLIGRAM(S): 2 INJECTION INTRAMUSCULAR; INTRAVENOUS; SUBCUTANEOUS at 08:47

## 2018-08-30 RX ADMIN — GABAPENTIN 300 MILLIGRAM(S): 400 CAPSULE ORAL at 21:00

## 2018-08-30 RX ADMIN — Medication 3 MILLILITER(S): at 23:40

## 2018-08-30 RX ADMIN — TRAMADOL HYDROCHLORIDE 50 MILLIGRAM(S): 50 TABLET ORAL at 06:30

## 2018-08-30 RX ADMIN — HYDROMORPHONE HYDROCHLORIDE 8 MILLIGRAM(S): 2 INJECTION INTRAMUSCULAR; INTRAVENOUS; SUBCUTANEOUS at 11:34

## 2018-08-30 NOTE — PROGRESS NOTE ADULT - SUBJECTIVE AND OBJECTIVE BOX
73 yo F, w/ PMH of emphysema, CAD, HTN, revision USAMA with ORIF for periprosthetic R femur fracture on 6/30/18 by Dr. Be, discharged on 7/3/18 to [facility] rehab, readmitted from 07/16/07/24/18 w/ worsening R hip pain and decreased ability to ambulate, now s/p right femur vancouver B1 periprosthetic fracture ORIF by Dr. Cornell on 07/19/18, BIBEMS s/p mechanical fall on stairs during the night c/o R hip and R leg pain, redness, swelling, stiffness. Patient denies other complaints. Denies headache, neck stiffness, fever/chills, vision change, chest pain, shortness of breath, difficulty breathing, palpitations, weakness, dizziness, nausea, vomiting, diarrhea, syncope.   From prior, note, patient had initial right hip fracture surgery at [facility] with a hip pin placement in 2015.  She developed avascular necrosis, necessitating a right total hip replacement at [facility] in 2017.  She was well for one year and had an accidental fall taking out her garbage June, 2018. She was then diagnosed with a periprosthetic fracture. Repair consisted of lengthening the right total hip replacement and then stabilization with the distal femur.  At rehabilitation, she had a nontraumatic separation of this distal stabilization which required a periprosthetic revision. Patients/p revision of right hip surgery as of 07/19/18. Patient present with fever and unclrear etiology.  She presented to the ED on 8/20 with R hip and thigh pain, swelling, stiffness, and redness in the setting of a mechanical fall on the stairs.  Then in the hospital spiked to 102.9, blood cx with MRSA  LE CT with lucency at the acetabular screw and prox femur, fluid collection in lateral soft tissue of thigh, an other fluid collection next to vastus intermedius.  Will need biopsy or drainage of the fluid collection. Cultures now show MRSA in Blood  and Urine. Needed procedure to deterine if there is  MRSA IN THE FLUID COLLECTION IN THE LEG. as per ortho would like to take Patient to the OR for washout and possible removal of hardware. seen today resting comfortably    MEDICATIONS  (STANDING):  ALBUTerol/ipratropium for Nebulization 3 milliLiter(s) Nebulizer every 6 hours  aspirin enteric coated 81 milliGRAM(s) Oral daily  buDESOnide   0.5 milliGRAM(s) Respule 0.5 milliGRAM(s) Inhalation two times a day  chlorhexidine 4% Liquid 1 Application(s) Topical <User Schedule>  folic acid 1 milliGRAM(s) Oral daily  gabapentin 300 milliGRAM(s) Oral three times a day  lactated ringers. 1000 milliLiter(s) (75 mL/Hr) IV Continuous <Continuous>  nicotine - 21 mG/24Hr(s) Patch 1 patch Transdermal daily  oxyCODONE  ER Tablet 20 milliGRAM(s) Oral every 12 hours  pantoprazole    Tablet 40 milliGRAM(s) Oral before breakfast  polyethylene glycol 3350 17 Gram(s) Oral daily  QUEtiapine 150 milliGRAM(s) Oral at bedtime  senna 2 Tablet(s) Oral at bedtime  thiamine 100 milliGRAM(s) Oral daily  traMADol 50 milliGRAM(s) Oral every 6 hours  vancomycin  IVPB 1000 milliGRAM(s) IV Intermittent every 12 hours    MEDICATIONS  (PRN):  acetaminophen   Tablet 650 milliGRAM(s) Oral every 6 hours PRN For Temp greater than 38.5 C (101.3 F)  acetaminophen   Tablet. 650 milliGRAM(s) Oral every 6 hours PRN Mild Pain (1 - 3)  HYDROmorphone   Tablet 4 milliGRAM(s) Oral every 3 hours PRN Moderate Pain (4 - 6)  HYDROmorphone   Tablet 8 milliGRAM(s) Oral every 3 hours PRN Severe Pain (7 - 10)  QUEtiapine 50 milliGRAM(s) Oral every 6 hours PRN anxiety/insomnia          VITALS:   T(C): 36.8 (08-29-18 @ 22:30), Max: 36.9 (08-29-18 @ 04:40)  HR: 76 (08-29-18 @ 22:30) (61 - 80)  BP: 104/57 (08-29-18 @ 22:30) (81/44 - 142/60)  RR: 18 (08-29-18 @ 22:30) (14 - 22)  SpO2: 95% (08-29-18 @ 22:30) (91% - 98%)  Wt(kg): --    PHYSICAL EXAM:  GENERAL: NAD, well nourished and conversant  HEAD:  Atraumatic  EYES: EOM, PERRLA, conjunctiva pink and sclera white  ENT: No tonsillar erythema, exudates, or enlargement, moist mucous membranes, good dentition, no lesions  NECK: Supple, No JVD, normal thyroid, carotids with normal upstrokes and no bruits  CHEST/LUNG: soft rales at the left base  HEART: Regular rate and rhythm, No murmurs, rubs, or gallops  ABDOMEN: Soft, nondistended, no masses, guarding, tenderness or rebound, bowel sounds present  EXTREMITIES:  2+ Peripheral Pulses, No clubbing, cyanosis, or edema. (+) right periprosthetic fracture site edema  LYMPH: No lymphadenopathy noted  SKIN: No rashes or lesions  NERVOUS SYSTEM:  Alert & Oriented X3, normal cognitive function. Motor Strength 5/5 right upper and right lower.  5/5 left upper and left lower extremities, DTRs 2+ intact and symmetric    LABS:        CBC Full  -  ( 29 Aug 2018 09:09 )  WBC Count : 6.31 K/uL  Hemoglobin : 8.9 g/dL  Hematocrit : 28.0 %  Platelet Count - Automated : 267 K/uL  Mean Cell Volume : 91.8 fl  Mean Cell Hemoglobin : 29.2 pg  Mean Cell Hemoglobin Concentration : 31.8 gm/dL  Auto Neutrophil # : x  Auto Lymphocyte # : x  Auto Monocyte # : x  Auto Eosinophil # : x  Auto Basophil # : x  Auto Neutrophil % : x  Auto Lymphocyte % : x  Auto Monocyte % : x  Auto Eosinophil % : x  Auto Basophil % : x    08-29    133<L>  |  99  |  35<H>  ----------------------------<  139<H>  4.2   |  23  |  0.86    Ca    8.3<L>      29 Aug 2018 07:20    TPro  7.0  /  Alb  2.7<L>  /  TBili  0.2  /  DBili  x   /  AST  29  /  ALT  27  /  AlkPhos  130<H>  08-29    LIVER FUNCTIONS - ( 29 Aug 2018 07:20 )  Alb: 2.7 g/dL / Pro: 7.0 g/dL / ALK PHOS: 130 U/L / ALT: 27 U/L / AST: 29 U/L / GGT: x           PT/INR - ( 29 Aug 2018 09:10 )   PT: 13.2 sec;   INR: 1.16 ratio         PTT - ( 29 Aug 2018 09:10 )  PTT:28.7 sec    CAPILLARY BLOOD GLUCOSE          RADIOLOGY & ADDITIONAL TESTS:

## 2018-08-30 NOTE — PROGRESS NOTE ADULT - ASSESSMENT
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 THR, kika-prosthetic R femur fx (s/p Total Hip revision with ORIF, 6/30/18), recent re-admission (07/16 - 07/24) for worsening R hip pain and decreased ability to ambulate now s/p R femur vancouver B1 periprosthetic fracture ORIF (07/19) who presented to the ED on 8/20 with R hip and thigh pain, swelling, stiffness, and redness in the setting of a mechanical fall on the stairs  in the hospital spiked to 102.9, blood cx with MRSA  LE CT with lucency at the acetabular screw and prox fem, fluid collection in lateral soft tissue of thigh, an other fluid collection next to vastus intermedius  MRI also showed Complex fluid collection along the anterolateral femur adjacent to the hardware and a subcutaneous collection along the proximal lateral right thigh.    high fever, MRSA bacteremia with hip hardware collection in view of complicated hip surgical history and recent ORIF  s/p I&D and washout yesterday, the hardware was not removed   worsening anemia after OR  I discussed with ortho regarding hardware removal but pt has a fresh fx and the hardware in the femur cannot be removed, washout and abscess drainage will done  monitoring drug levels     * f/u the OR cultures  * c/w vanco 1 g q 12, vanco trough 19.9  * check the level tomorrow  * can place a PICC line  * will need a long course of IV likely 8 weeks and then chronic suppression with bactrim and she will have a high chance of recurrence  * TTE

## 2018-08-30 NOTE — PROGRESS NOTE ADULT - SUBJECTIVE AND OBJECTIVE BOX
Patient seen and examined. Pain controlled.    Physical exam  VS: see EMR  Gen: NAD  Right LE: Dressing clean, dry, and intact. Patient does not cooperate with neurovascular exam. +Capillary refill brisk. Compartments soft and compressible.      72F s/p right thigh I&D of vancouver B2 ORIF PJI    Weight bear as tolerated with posterior hip precautions  Pain control  DVT prophylaxis  Abduction while supine/seated  Physical therapy  Discharge planning  Appreciate ID/medical recommendations

## 2018-08-30 NOTE — PROGRESS NOTE ADULT - SUBJECTIVE AND OBJECTIVE BOX
73 yo F, w/ PMH of emphysema, CAD, HTN, revision USAMA with ORIF for periprosthetic R femur fracture on 6/30/18 by Dr. Be, discharged on 7/3/18 to [facility] rehab, readmitted from 07/16/07/24/18 w/ worsening R hip pain and decreased ability to ambulate, now s/p right femur vancouver B1 periprosthetic fracture ORIF by Dr. Cornell on 07/19/18, BIBEMS s/p mechanical fall on stairs during the night c/o R hip and R leg pain, redness, swelling, stiffness. Patient denies other complaints. Denies headache, neck stiffness, fever/chills, vision change, chest pain, shortness of breath, difficulty breathing, palpitations, weakness, dizziness, nausea, vomiting, diarrhea, syncope.   From prior, note, patient had initial right hip fracture surgery at [facility] with a hip pin placement in 2015.  She developed avascular necrosis, necessitating a right total hip replacement at [facility] in 2017.  She was well for one year and had an accidental fall taking out her garbage June, 2018. She was then diagnosed with a periprosthetic fracture. Repair consisted of lengthening the right total hip replacement and then stabilization with the distal femur.  At rehabilitation, she had a nontraumatic separation of this distal stabilization which required a periprosthetic revision. Patients/p revision of right hip surgery as of 07/19/18. Patient present with fever and unclrear etiology.  She presented to the ED on 8/20 with R hip and thigh pain, swelling, stiffness, and redness in the setting of a mechanical fall on the stairs.  Then in the hospital spiked to 102.9, blood cx with MRSA  LE CT with lucency at the acetabular screw and prox femur, fluid collection in lateral soft tissue of thigh, an other fluid collection next to vastus intermedius.  Will need biopsy or drainage of the fluid collection. Cultures now show MRSA in Blood  and Urine. Needed procedure to deterine if there is  MRSA IN THE FLUID COLLECTION IN THE LEG. Patient seen today s/p washout of the right leg. Patient found to have anemia probably due to acute blood loss post op.       MEDICATIONS  (STANDING):  ALBUTerol/ipratropium for Nebulization 3 milliLiter(s) Nebulizer every 6 hours  aspirin enteric coated 81 milliGRAM(s) Oral daily  buDESOnide   0.5 milliGRAM(s) Respule 0.5 milliGRAM(s) Inhalation two times a day  chlorhexidine 4% Liquid 1 Application(s) Topical <User Schedule>  enoxaparin Injectable 40 milliGRAM(s) SubCutaneous daily  folic acid 1 milliGRAM(s) Oral daily  gabapentin 300 milliGRAM(s) Oral three times a day  lactated ringers. 1000 milliLiter(s) (75 mL/Hr) IV Continuous <Continuous>  nicotine - 21 mG/24Hr(s) Patch 1 patch Transdermal daily  oxyCODONE  ER Tablet 20 milliGRAM(s) Oral every 12 hours  pantoprazole    Tablet 40 milliGRAM(s) Oral before breakfast  polyethylene glycol 3350 17 Gram(s) Oral daily  QUEtiapine 150 milliGRAM(s) Oral at bedtime  senna 2 Tablet(s) Oral at bedtime  thiamine 100 milliGRAM(s) Oral daily  traMADol 50 milliGRAM(s) Oral every 6 hours  vancomycin  IVPB 1000 milliGRAM(s) IV Intermittent every 12 hours    MEDICATIONS  (PRN):  acetaminophen   Tablet 650 milliGRAM(s) Oral every 6 hours PRN For Temp greater than 38.5 C (101.3 F)  acetaminophen   Tablet. 650 milliGRAM(s) Oral every 6 hours PRN Mild Pain (1 - 3)  HYDROmorphone   Tablet 4 milliGRAM(s) Oral every 3 hours PRN Moderate Pain (4 - 6)  HYDROmorphone   Tablet 8 milliGRAM(s) Oral every 3 hours PRN Severe Pain (7 - 10)  QUEtiapine 50 milliGRAM(s) Oral every 6 hours PRN anxiety/insomnia          VITALS:   T(C): 37.1 (08-30-18 @ 18:17), Max: 37.9 (08-30-18 @ 14:16)  HR: 89 (08-30-18 @ 18:17) (67 - 89)  BP: 101/55 (08-30-18 @ 18:17) (101/55 - 118/65)  RR: 18 (08-30-18 @ 18:17) (18 - 18)  SpO2: 95% (08-30-18 @ 18:17) (94% - 97%)  Wt(kg): --      PHYSICAL EXAM:  GENERAL: NAD, well nourished and conversant  HEAD:  Atraumatic  EYES: EOM, PERRLA, conjunctiva pink and sclera white  ENT: No tonsillar erythema, exudates, or enlargement, moist mucous membranes, good dentition, no lesions  NECK: Supple, No JVD, normal thyroid, carotids with normal upstrokes and no bruits  CHEST/LUNG: soft rales at the left base  HEART: Regular rate and rhythm, No murmurs, rubs, or gallops  ABDOMEN: Soft, nondistended, no masses, guarding, tenderness or rebound, bowel sounds present  EXTREMITIES:  2+ Peripheral Pulses, No clubbing, cyanosis, or edema. (+) right periprosthetic fracture site edema  LYMPH: No lymphadenopathy noted  SKIN: No rashes or lesions  NERVOUS SYSTEM:  Alert & Oriented X3, normal cognitive function. Motor Strength 5/5 right upper and right lower.  5/5 left upper and left lower extremities, DTRs 2+ intact and symmetric    LABS:        CBC Full  -  ( 30 Aug 2018 12:57 )  WBC Count : 7.5 K/uL  Hemoglobin : 6.2 g/dL  Hematocrit : 19.9 %  Platelet Count - Automated : 330 K/uL  Mean Cell Volume : 89.9 fl  Mean Cell Hemoglobin : 27.9 pg  Mean Cell Hemoglobin Concentration : 31.1 gm/dL  Auto Neutrophil # : x  Auto Lymphocyte # : x  Auto Monocyte # : x  Auto Eosinophil # : x  Auto Basophil # : x  Auto Neutrophil % : x  Auto Lymphocyte % : x  Auto Monocyte % : x  Auto Eosinophil % : x  Auto Basophil % : x    08-30    135  |  100  |  25<H>  ----------------------------<  97  4.3   |  24  |  0.87    Ca    9.1      30 Aug 2018 07:23    TPro  7.0  /  Alb  2.7<L>  /  TBili  0.2  /  DBili  x   /  AST  29  /  ALT  27  /  AlkPhos  130<H>  08-29    LIVER FUNCTIONS - ( 29 Aug 2018 07:20 )  Alb: 2.7 g/dL / Pro: 7.0 g/dL / ALK PHOS: 130 U/L / ALT: 27 U/L / AST: 29 U/L / GGT: x           PT/INR - ( 29 Aug 2018 09:10 )   PT: 13.2 sec;   INR: 1.16 ratio         PTT - ( 29 Aug 2018 09:10 )  PTT:28.7 sec    CAPILLARY BLOOD GLUCOSE          RADIOLOGY & ADDITIONAL TESTS:          MEDICATIONS  (STANDING):  ALBUTerol/ipratropium for Nebulization 3 milliLiter(s) Nebulizer every 6 hours  aspirin enteric coated 81 milliGRAM(s) Oral daily  buDESOnide   0.5 milliGRAM(s) Respule 0.5 milliGRAM(s) Inhalation two times a day  chlorhexidine 4% Liquid 1 Application(s) Topical <User Schedule>  enoxaparin Injectable 40 milliGRAM(s) SubCutaneous daily  folic acid 1 milliGRAM(s) Oral daily  gabapentin 300 milliGRAM(s) Oral three times a day  lactated ringers. 1000 milliLiter(s) (75 mL/Hr) IV Continuous <Continuous>  nicotine - 21 mG/24Hr(s) Patch 1 patch Transdermal daily  oxyCODONE  ER Tablet 20 milliGRAM(s) Oral every 12 hours  pantoprazole    Tablet 40 milliGRAM(s) Oral before breakfast  polyethylene glycol 3350 17 Gram(s) Oral daily  QUEtiapine 150 milliGRAM(s) Oral at bedtime  senna 2 Tablet(s) Oral at bedtime  thiamine 100 milliGRAM(s) Oral daily  traMADol 50 milliGRAM(s) Oral every 6 hours  vancomycin  IVPB 1000 milliGRAM(s) IV Intermittent every 12 hours    MEDICATIONS  (PRN):  acetaminophen   Tablet 650 milliGRAM(s) Oral every 6 hours PRN For Temp greater than 38.5 C (101.3 F)  acetaminophen   Tablet. 650 milliGRAM(s) Oral every 6 hours PRN Mild Pain (1 - 3)  HYDROmorphone   Tablet 4 milliGRAM(s) Oral every 3 hours PRN Moderate Pain (4 - 6)  HYDROmorphone   Tablet 8 milliGRAM(s) Oral every 3 hours PRN Severe Pain (7 - 10)  QUEtiapine 50 milliGRAM(s) Oral every 6 hours PRN anxiety/insomnia          VITALS:   T(C): 37.1 (08-30-18 @ 18:17), Max: 37.9 (08-30-18 @ 14:16)  HR: 89 (08-30-18 @ 18:17) (67 - 89)  BP: 101/55 (08-30-18 @ 18:17) (101/55 - 118/65)  RR: 18 (08-30-18 @ 18:17) (18 - 18)  SpO2: 95% (08-30-18 @ 18:17) (94% - 97%)  Wt(kg): --        LABS:        CBC Full  -  ( 30 Aug 2018 12:57 )  WBC Count : 7.5 K/uL  Hemoglobin : 6.2 g/dL  Hematocrit : 19.9 %  Platelet Count - Automated : 330 K/uL  Mean Cell Volume : 89.9 fl  Mean Cell Hemoglobin : 27.9 pg  Mean Cell Hemoglobin Concentration : 31.1 gm/dL  Auto Neutrophil # : x  Auto Lymphocyte # : x  Auto Monocyte # : x  Auto Eosinophil # : x  Auto Basophil # : x  Auto Neutrophil % : x  Auto Lymphocyte % : x  Auto Monocyte % : x  Auto Eosinophil % : x  Auto Basophil % : x    08-30    135  |  100  |  25<H>  ----------------------------<  97  4.3   |  24  |  0.87    Ca    9.1      30 Aug 2018 07:23    TPro  7.0  /  Alb  2.7<L>  /  TBili  0.2  /  DBili  x   /  AST  29  /  ALT  27  /  AlkPhos  130<H>  08-29    LIVER FUNCTIONS - ( 29 Aug 2018 07:20 )  Alb: 2.7 g/dL / Pro: 7.0 g/dL / ALK PHOS: 130 U/L / ALT: 27 U/L / AST: 29 U/L / GGT: x           PT/INR - ( 29 Aug 2018 09:10 )   PT: 13.2 sec;   INR: 1.16 ratio         PTT - ( 29 Aug 2018 09:10 )  PTT:28.7 sec    CAPILLARY BLOOD GLUCOSE          RADIOLOGY & ADDITIONAL TESTS:

## 2018-08-30 NOTE — PROGRESS NOTE ADULT - SUBJECTIVE AND OBJECTIVE BOX
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    Interval History: pt went to OR s/o washout now with Hgb of 5.9    ROS:      All other systems negative    Constitutional: no fever, no chills  Head: no trauma  Eyes: no vision changes, no eye pain  ENT:  no sore throat, no rhinorrhea  Cardiovascular:  no chest pain, no palpitation  Respiratory:  no SOB, no cough  GI:  no abd pain, no vomiting, no diarrhea  urinary: no dysuria, no hematuria, no flank pain  musculoskeletal:  right thigh and hip pain after the surgery  skin:  no rash  neurology:  no headache, no seizure, no change in mental status  psych: no anxiety, no depression         Allergies  No Known Allergies        ANTIMICROBIALS:  vancomycin  IVPB 1000 every 12 hours      OTHER MEDS:  acetaminophen   Tablet 650 milliGRAM(s) Oral every 6 hours PRN  acetaminophen   Tablet. 650 milliGRAM(s) Oral every 6 hours PRN  ALBUTerol/ipratropium for Nebulization 3 milliLiter(s) Nebulizer every 6 hours  aspirin enteric coated 81 milliGRAM(s) Oral daily  buDESOnide   0.5 milliGRAM(s) Respule 0.5 milliGRAM(s) Inhalation two times a day  chlorhexidine 4% Liquid 1 Application(s) Topical <User Schedule>  enoxaparin Injectable 40 milliGRAM(s) SubCutaneous daily  folic acid 1 milliGRAM(s) Oral daily  gabapentin 300 milliGRAM(s) Oral three times a day  HYDROmorphone   Tablet 4 milliGRAM(s) Oral every 3 hours PRN  HYDROmorphone   Tablet 8 milliGRAM(s) Oral every 3 hours PRN  lactated ringers. 1000 milliLiter(s) IV Continuous <Continuous>  nicotine - 21 mG/24Hr(s) Patch 1 patch Transdermal daily  oxyCODONE  ER Tablet 20 milliGRAM(s) Oral every 12 hours  pantoprazole    Tablet 40 milliGRAM(s) Oral before breakfast  polyethylene glycol 3350 17 Gram(s) Oral daily  QUEtiapine 50 milliGRAM(s) Oral every 6 hours PRN  QUEtiapine 150 milliGRAM(s) Oral at bedtime  senna 2 Tablet(s) Oral at bedtime  thiamine 100 milliGRAM(s) Oral daily  traMADol 50 milliGRAM(s) Oral every 6 hours      Vital Signs Last 24 Hrs  T(C): 36.9 (30 Aug 2018 04:07), Max: 37.3 (30 Aug 2018 00:53)  T(F): 98.5 (30 Aug 2018 04:07), Max: 99.1 (30 Aug 2018 00:53)  HR: 67 (30 Aug 2018 04:07) (61 - 77)  BP: 114/63 (30 Aug 2018 04:07) (81/44 - 142/60)  BP(mean): 84 (29 Aug 2018 18:15) (59 - 84)  RR: 18 (30 Aug 2018 04:07) (14 - 22)  SpO2: 96% (30 Aug 2018 04:07) (91% - 98%)    Physical Exam:  General:    NAD,  non toxic, A&O x 3  Head: atraumatic, normocephalic  Eye: normal sclera and conjunctiva  ENT:    no oropharyngeal lesions,   no LAD,   neck supple  Cardio:     regular S1, S2,  no murmur  Respiratory:    clear b/l,    no wheezing  abd:     soft,   BS +,   no tenderness,    no organomegaly  :   no CVAT,  no suprapubic tenderness,   no  edmondson  Musculoskeletal:   R thigh s/p I&D and wash out with edema and tenderness  vascular: no lines, normal pulses  Skin:    no rash  Neurologic:     no focal deficit  psych: pt paranoid but now cooperative, asking for pain medications                          6.2    7.5   )-----------( 330      ( 30 Aug 2018 12:57 )             19.9       08-30    135  |  100  |  25<H>  ----------------------------<  97  4.3   |  24  |  0.87    Ca    9.1      30 Aug 2018 07:23    TPro  7.0  /  Alb  2.7<L>  /  TBili  0.2  /  DBili  x   /  AST  29  /  ALT  27  /  AlkPhos  130<H>  08-29          MICROBIOLOGY:  Vancomycin Level, Trough: 19.9 ug/mL (08-30-18 @ 09:20)  v  .Other Other, #3 Right Femur  08-29-18   Testing in progress  --  --      .Other Other, # 1 Right femur  08-29-18   Testing in progress  --  --      .Blood Blood-Peripheral  08-23-18   No growth at 5 days.  --  --      .Urine Clean Catch (Midstream)  08-23-18   50,000 - 99,000 CFU/mL Methicillin resistant Staphylococcus aureus  --  Methicillin resistant Staphylococcus aureus      .Blood Blood  08-23-18   Growth in aerobic bottle: Coag Negative Staphylococcus  Single set isolate, possible contaminant. Contact  Microbiology if susceptibility testing clinically  indicated.  --    Growth in aerobic bottle: Gram Positive Cocci in Clusters      .Blood Blood  08-22-18   Growth in aerobic bottle: Methicillin resistant Staphylococcus aureus    .Urine Catheterized  08-20-18   No growth  --  --                RADIOLOGY:  Images below reviewed personally  < from: MR Femur No Cont, Right (08.24.18 @ 22:08) >  IMPRESSION:  Status post right hip revision with susceptibility artifact.  Complex fluid collection along the anterolateral femur adjacent to the   hardware, which may represent a hematoma and/or infection. Additional   subcutaneous collection along the proximal lateral right thigh. Bilateral   knee joint effusions.

## 2018-08-30 NOTE — PROGRESS NOTE ADULT - ASSESSMENT
Patient returns with right leg pain now found to have fever .  (+) MRSA bacteremia and (+)  urine cultures a well .  Patient draining from her per-prosthetic fracture site and IR aspiration of fluid collection was scheduled however patient refused and leg is oozing. Patient s/p  washout of hip wound.

## 2018-08-30 NOTE — PROGRESS NOTE ADULT - ASSESSMENT
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 THR, kika-prosthetic R femur fx (s/p Total Hip revision with ORIF, 6/30/18), recent re-admission (07/16 - 07/24) for worsening R hip pain and decreased ability to ambulate now s/p R femur vancouver B1 periprosthetic fracture ORIF (07/19) who presented to the ED on 8/20 with R hip and thigh pain, swelling, stiffness, and redness in the setting of a mechanical fall on the stairs  in the hospital spiked to 102.9, blood cx with MRSA  LE CT with lucency at the acetabular screw and prox fem, fluid collection in lateral soft tissue of thigh, an other fluid collection next to vastus intermedius  MRI also showed Complex fluid collection along the anterolateral femur adjacent to the hardware and a subcutaneous collection along the proximal lateral right thigh. Patient s/p I&D of right thigh

## 2018-08-30 NOTE — PROGRESS NOTE ADULT - ATTENDING COMMENTS
For loculated fluid drainage and possible hardware revision in the AM. The patient is medically stable, medically optimized and has no medical contraindication to surgery tomorrow as required. Exam time 40 minutes including > than 50 % for bedside discussion and counseling.

## 2018-08-31 ENCOUNTER — TRANSCRIPTION ENCOUNTER (OUTPATIENT)
Age: 73
End: 2018-08-31

## 2018-08-31 LAB
-  AMPICILLIN/SULBACTAM: SIGNIFICANT CHANGE UP
-  CEFAZOLIN: SIGNIFICANT CHANGE UP
-  CLINDAMYCIN: SIGNIFICANT CHANGE UP
-  DAPTOMYCIN: SIGNIFICANT CHANGE UP
-  ERYTHROMYCIN: SIGNIFICANT CHANGE UP
-  GENTAMICIN: SIGNIFICANT CHANGE UP
-  LINEZOLID: SIGNIFICANT CHANGE UP
-  OXACILLIN: SIGNIFICANT CHANGE UP
-  PENICILLIN: SIGNIFICANT CHANGE UP
-  RIFAMPIN: SIGNIFICANT CHANGE UP
-  TETRACYCLINE: SIGNIFICANT CHANGE UP
-  TRIMETHOPRIM/SULFAMETHOXAZOLE: SIGNIFICANT CHANGE UP
-  VANCOMYCIN: SIGNIFICANT CHANGE UP
ANION GAP SERPL CALC-SCNC: 13 MMOL/L — SIGNIFICANT CHANGE UP (ref 5–17)
BUN SERPL-MCNC: 20 MG/DL — SIGNIFICANT CHANGE UP (ref 7–23)
CALCIUM SERPL-MCNC: 9.8 MG/DL — SIGNIFICANT CHANGE UP (ref 8.4–10.5)
CHLORIDE SERPL-SCNC: 97 MMOL/L — SIGNIFICANT CHANGE UP (ref 96–108)
CO2 SERPL-SCNC: 25 MMOL/L — SIGNIFICANT CHANGE UP (ref 22–31)
CREAT SERPL-MCNC: 0.85 MG/DL — SIGNIFICANT CHANGE UP (ref 0.5–1.3)
GLUCOSE SERPL-MCNC: 105 MG/DL — HIGH (ref 70–99)
HCT VFR BLD CALC: 25.3 % — LOW (ref 34.5–45)
HCT VFR BLD CALC: 25.8 % — LOW (ref 34.5–45)
HGB BLD-MCNC: 8.2 G/DL — LOW (ref 11.5–15.5)
HGB BLD-MCNC: 8.3 G/DL — LOW (ref 11.5–15.5)
MCHC RBC-ENTMCNC: 27.3 PG — SIGNIFICANT CHANGE UP (ref 27–34)
MCHC RBC-ENTMCNC: 27.5 PG — SIGNIFICANT CHANGE UP (ref 27–34)
MCHC RBC-ENTMCNC: 32.1 GM/DL — SIGNIFICANT CHANGE UP (ref 32–36)
MCHC RBC-ENTMCNC: 32.5 GM/DL — SIGNIFICANT CHANGE UP (ref 32–36)
MCV RBC AUTO: 84.7 FL — SIGNIFICANT CHANGE UP (ref 80–100)
MCV RBC AUTO: 84.8 FL — SIGNIFICANT CHANGE UP (ref 80–100)
METHOD TYPE: SIGNIFICANT CHANGE UP
OB PNL STL: NEGATIVE — SIGNIFICANT CHANGE UP
PLATELET # BLD AUTO: 317 K/UL — SIGNIFICANT CHANGE UP (ref 150–400)
PLATELET # BLD AUTO: 324 K/UL — SIGNIFICANT CHANGE UP (ref 150–400)
POTASSIUM SERPL-MCNC: 4.4 MMOL/L — SIGNIFICANT CHANGE UP (ref 3.5–5.3)
POTASSIUM SERPL-SCNC: 4.4 MMOL/L — SIGNIFICANT CHANGE UP (ref 3.5–5.3)
RBC # BLD: 2.98 M/UL — LOW (ref 3.8–5.2)
RBC # BLD: 3.04 M/UL — LOW (ref 3.8–5.2)
RBC # FLD: 19.2 % — HIGH (ref 10.3–14.5)
RBC # FLD: 19.6 % — HIGH (ref 10.3–14.5)
SODIUM SERPL-SCNC: 135 MMOL/L — SIGNIFICANT CHANGE UP (ref 135–145)
VANCOMYCIN TROUGH SERPL-MCNC: 22.9 UG/ML — HIGH (ref 10–20)
WBC # BLD: 7.3 K/UL — SIGNIFICANT CHANGE UP (ref 3.8–10.5)
WBC # BLD: 8.5 K/UL — SIGNIFICANT CHANGE UP (ref 3.8–10.5)
WBC # FLD AUTO: 7.3 K/UL — SIGNIFICANT CHANGE UP (ref 3.8–10.5)
WBC # FLD AUTO: 8.5 K/UL — SIGNIFICANT CHANGE UP (ref 3.8–10.5)

## 2018-08-31 PROCEDURE — 99233 SBSQ HOSP IP/OBS HIGH 50: CPT

## 2018-08-31 RX ORDER — MULTIVIT-MIN/FERROUS GLUCONATE 9 MG/15 ML
1 LIQUID (ML) ORAL DAILY
Qty: 0 | Refills: 0 | Status: DISCONTINUED | OUTPATIENT
Start: 2018-08-31 | End: 2018-09-07

## 2018-08-31 RX ADMIN — OXYCODONE HYDROCHLORIDE 20 MILLIGRAM(S): 5 TABLET ORAL at 06:44

## 2018-08-31 RX ADMIN — Medication 250 MILLIGRAM(S): at 10:18

## 2018-08-31 RX ADMIN — HYDROMORPHONE HYDROCHLORIDE 8 MILLIGRAM(S): 2 INJECTION INTRAMUSCULAR; INTRAVENOUS; SUBCUTANEOUS at 01:19

## 2018-08-31 RX ADMIN — QUETIAPINE FUMARATE 50 MILLIGRAM(S): 200 TABLET, FILM COATED ORAL at 00:54

## 2018-08-31 RX ADMIN — HYDROMORPHONE HYDROCHLORIDE 8 MILLIGRAM(S): 2 INJECTION INTRAMUSCULAR; INTRAVENOUS; SUBCUTANEOUS at 07:59

## 2018-08-31 RX ADMIN — QUETIAPINE FUMARATE 50 MILLIGRAM(S): 200 TABLET, FILM COATED ORAL at 07:03

## 2018-08-31 RX ADMIN — ENOXAPARIN SODIUM 40 MILLIGRAM(S): 100 INJECTION SUBCUTANEOUS at 13:32

## 2018-08-31 RX ADMIN — CHLORHEXIDINE GLUCONATE 1 APPLICATION(S): 213 SOLUTION TOPICAL at 10:18

## 2018-08-31 RX ADMIN — Medication 1 PATCH: at 13:33

## 2018-08-31 RX ADMIN — GABAPENTIN 300 MILLIGRAM(S): 400 CAPSULE ORAL at 06:44

## 2018-08-31 RX ADMIN — PANTOPRAZOLE SODIUM 40 MILLIGRAM(S): 20 TABLET, DELAYED RELEASE ORAL at 06:45

## 2018-08-31 RX ADMIN — Medication 81 MILLIGRAM(S): at 13:32

## 2018-08-31 RX ADMIN — HYDROMORPHONE HYDROCHLORIDE 8 MILLIGRAM(S): 2 INJECTION INTRAMUSCULAR; INTRAVENOUS; SUBCUTANEOUS at 13:33

## 2018-08-31 RX ADMIN — GABAPENTIN 300 MILLIGRAM(S): 400 CAPSULE ORAL at 21:12

## 2018-08-31 RX ADMIN — Medication 650 MILLIGRAM(S): at 21:13

## 2018-08-31 RX ADMIN — HYDROMORPHONE HYDROCHLORIDE 8 MILLIGRAM(S): 2 INJECTION INTRAMUSCULAR; INTRAVENOUS; SUBCUTANEOUS at 14:03

## 2018-08-31 RX ADMIN — HYDROMORPHONE HYDROCHLORIDE 8 MILLIGRAM(S): 2 INJECTION INTRAMUSCULAR; INTRAVENOUS; SUBCUTANEOUS at 01:49

## 2018-08-31 RX ADMIN — TRAMADOL HYDROCHLORIDE 50 MILLIGRAM(S): 50 TABLET ORAL at 14:03

## 2018-08-31 RX ADMIN — OXYCODONE HYDROCHLORIDE 20 MILLIGRAM(S): 5 TABLET ORAL at 07:14

## 2018-08-31 RX ADMIN — HYDROMORPHONE HYDROCHLORIDE 8 MILLIGRAM(S): 2 INJECTION INTRAMUSCULAR; INTRAVENOUS; SUBCUTANEOUS at 21:42

## 2018-08-31 RX ADMIN — HYDROMORPHONE HYDROCHLORIDE 8 MILLIGRAM(S): 2 INJECTION INTRAMUSCULAR; INTRAVENOUS; SUBCUTANEOUS at 21:12

## 2018-08-31 RX ADMIN — TRAMADOL HYDROCHLORIDE 50 MILLIGRAM(S): 50 TABLET ORAL at 07:14

## 2018-08-31 RX ADMIN — TRAMADOL HYDROCHLORIDE 50 MILLIGRAM(S): 50 TABLET ORAL at 13:33

## 2018-08-31 RX ADMIN — TRAMADOL HYDROCHLORIDE 50 MILLIGRAM(S): 50 TABLET ORAL at 18:16

## 2018-08-31 RX ADMIN — HYDROMORPHONE HYDROCHLORIDE 8 MILLIGRAM(S): 2 INJECTION INTRAMUSCULAR; INTRAVENOUS; SUBCUTANEOUS at 10:47

## 2018-08-31 RX ADMIN — QUETIAPINE FUMARATE 50 MILLIGRAM(S): 200 TABLET, FILM COATED ORAL at 13:32

## 2018-08-31 RX ADMIN — OXYCODONE HYDROCHLORIDE 20 MILLIGRAM(S): 5 TABLET ORAL at 18:16

## 2018-08-31 RX ADMIN — Medication 1 PATCH: at 13:31

## 2018-08-31 RX ADMIN — Medication 1 MILLIGRAM(S): at 13:33

## 2018-08-31 RX ADMIN — HYDROMORPHONE HYDROCHLORIDE 8 MILLIGRAM(S): 2 INJECTION INTRAMUSCULAR; INTRAVENOUS; SUBCUTANEOUS at 10:17

## 2018-08-31 RX ADMIN — QUETIAPINE FUMARATE 150 MILLIGRAM(S): 200 TABLET, FILM COATED ORAL at 21:12

## 2018-08-31 RX ADMIN — TRAMADOL HYDROCHLORIDE 50 MILLIGRAM(S): 50 TABLET ORAL at 00:10

## 2018-08-31 RX ADMIN — HYDROMORPHONE HYDROCHLORIDE 8 MILLIGRAM(S): 2 INJECTION INTRAMUSCULAR; INTRAVENOUS; SUBCUTANEOUS at 18:16

## 2018-08-31 RX ADMIN — TRAMADOL HYDROCHLORIDE 50 MILLIGRAM(S): 50 TABLET ORAL at 06:44

## 2018-08-31 RX ADMIN — OXYCODONE HYDROCHLORIDE 20 MILLIGRAM(S): 5 TABLET ORAL at 17:46

## 2018-08-31 RX ADMIN — HYDROMORPHONE HYDROCHLORIDE 8 MILLIGRAM(S): 2 INJECTION INTRAMUSCULAR; INTRAVENOUS; SUBCUTANEOUS at 17:46

## 2018-08-31 RX ADMIN — HYDROMORPHONE HYDROCHLORIDE 8 MILLIGRAM(S): 2 INJECTION INTRAMUSCULAR; INTRAVENOUS; SUBCUTANEOUS at 07:29

## 2018-08-31 RX ADMIN — Medication 650 MILLIGRAM(S): at 21:43

## 2018-08-31 RX ADMIN — Medication 100 MILLIGRAM(S): at 13:33

## 2018-08-31 RX ADMIN — TRAMADOL HYDROCHLORIDE 50 MILLIGRAM(S): 50 TABLET ORAL at 17:47

## 2018-08-31 RX ADMIN — GABAPENTIN 300 MILLIGRAM(S): 400 CAPSULE ORAL at 13:34

## 2018-08-31 NOTE — DISCHARGE NOTE ADULT - CARE PROVIDERS DIRECT ADDRESSES
[email or web address]

## 2018-08-31 NOTE — PROGRESS NOTE ADULT - ASSESSMENT
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 THR, kika-prosthetic R femur fx (s/p Total Hip revision with ORIF, 6/30/18), recent re-admission (07/16 - 07/24) for worsening R hip pain and decreased ability to ambulate now s/p R femur vancouver B1 periprosthetic fracture ORIF (07/19) who presented to the ED on 8/20 with R hip and thigh pain, swelling, stiffness, and redness in the setting of a mechanical fall on the stairs  in the hospital spiked to 102.9, blood cx with MRSA  LE CT with lucency at the acetabular screw and prox fem, fluid collection in lateral soft tissue of thigh, an other fluid collection next to vastus intermedius  MRI also showed Complex fluid collection along the anterolateral femur adjacent to the hardware and a subcutaneous collection along the proximal lateral right thigh.    high fever, MRSA bacteremia with hip hardware collection in view of complicated hip surgical history and recent ORIF  s/p I&D and washout 8/29, the hardware was not removed and OR cultures with MRSA  worsening anemia after OR  I discussed with ortho regarding hardware removal but pt has a fresh fx and the hardware in the femur cannot be removed, washout and abscess drainage will done  monitoring drug levels       * c/w vanco 1 g q 12  * check the level tomorrow  * add rifampin 600 IV as the hardware is infected and cannot be removed  * can place a PICC line  * will need a long course of IV likely 8 weeks and then chronic suppression with bactrim and she will have a high chance of recurrence  * TTE

## 2018-08-31 NOTE — PROGRESS NOTE ADULT - SUBJECTIVE AND OBJECTIVE BOX
Patient seen and examined. Pain controlled. Got 2U PRBC yesterday doing better this morning. No acute events overnight. Patient walked with PT.    Allergies    No Known Allergies    Intolerances                            8.1    8.3   )-----------( 336      ( 30 Aug 2018 21:52 )             25.5     30 Aug 2018 07:23    135    |  100    |  25     ----------------------------<  97     4.3     |  24     |  0.87     Ca    9.1        30 Aug 2018 07:23    TPro  7.0    /  Alb  2.7    /  TBili  0.2    /  DBili  x      /  AST  29     /  ALT  27     /  AlkPhos  130    29 Aug 2018 07:20    PT/INR - ( 29 Aug 2018 09:10 )   PT: 13.2 sec;   INR: 1.16 ratio         PTT - ( 29 Aug 2018 09:10 )  PTT:28.7 sec  Vital Signs Last 24 Hrs  T(C): 36.9 (08-30-18 @ 21:07), Max: 37.9 (08-30-18 @ 14:16)  T(F): 98.4 (08-30-18 @ 21:07), Max: 100.2 (08-30-18 @ 14:16)  HR: 73 (08-30-18 @ 21:07) (73 - 89)  BP: 108/74 (08-30-18 @ 21:07) (101/55 - 108/74)  BP(mean): --  RR: 18 (08-30-18 @ 21:07) (18 - 18)  SpO2: 95% (08-30-18 @ 21:07) (94% - 95%)    Physical Exam  Gen: NAD  RLE:   Dressing c/d/i  ace wrap in place  +ehl/fhl/ta/gs function  L2-S1 silt  Dp/pt pulse intact  No calf ttp  Compartments soft    A/P:  72y Female sp R hip periprosthetic I+D  Pain control  DVT ppx, L A81  will need PICC placed  ID recs appreciated   PT/WBAT/OOB  FU labs  Medical management appreciated  Incentive spirometry  Dispo planning

## 2018-08-31 NOTE — PROVIDER CONTACT NOTE (CRITICAL VALUE NOTIFICATION) - ASSESSMENT
patient not scheduled for any abx currently, no fevers during this shift.
Pt is prescribed IV Vancomycin and IV Rifampin.
pt AOX3, V/S WNL no SOB or distress

## 2018-08-31 NOTE — PROGRESS NOTE ADULT - ASSESSMENT
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 THR, kika-prosthetic R femur fx (s/p Total Hip revision with ORIF, 6/30/18), recent re-admission (07/16 - 07/24) for worsening R hip pain and decreased ability to ambulate now s/p R femur vancouver B1 periprosthetic fracture ORIF (07/19) who presented to the ED on 8/20 with R hip and thigh pain, swelling, stiffness, and redness in the setting of a mechanical fall on the stairs  in the hospital spiked to 102.9, blood cx with MRSA  LE CT with lucency at the acetabular screw and prox fem, fluid collection in lateral soft tissue of thigh, an other fluid collection next to vastus intermedius  MRI also showed Complex fluid collection along the anterolateral femur adjacent to the hardware and a subcutaneous collection along the proximal lateral right thigh. Patient s/p I&D of right thigh 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 THR, kika-prosthetic R femur fx (s/p Total Hip revision with ORIF, 6/30/18), recent re-admission (07/16 - 07/24) for worsening R hip pain and decreased ability to ambulate now s/p R femur vancouver B1 periprosthetic fracture ORIF (07/19) who presented to the ED on 8/20 with R hip and thigh pain, swelling, stiffness, and redness in the setting of a mechanical fall on the stairs  in the hospital spiked to 102.9, blood cx with MRSA  LE CT with lucency at the acetabular screw and prox fem, fluid collection in lateral soft tissue of thigh, an other fluid collection next to vastus intermedius  MRI also showed Complex fluid collection along the anterolateral femur adjacent to the hardware and a subcutaneous collection along the proximal lateral right thigh. Patient s/p I&D of right thigh cultures sent await resluts

## 2018-08-31 NOTE — DIETITIAN INITIAL EVALUATION ADULT. - NUTRITION INTERVENTIONS
Greek yogurt 3x/day, Fig Newtons/nutrient dense snacks/food preferences as requested by patient (specify)

## 2018-08-31 NOTE — DISCHARGE NOTE ADULT - PLAN OF CARE
8/29: Had irrigation and debridement of Right thigh hardware Complete course of antibiotic therapy   Routine Picc line care MRSA Continue with nebulizer Continue with Aspirin Complete course of antibiotic therapy util 10/29  Routine Picc line care Infection resolves Continue with antibiotics through 10/29/18 via PICC.  Weekly CBC, CMP and vancomycin trough while on IV antibiotic.  Follow up with ID clinic in 4-5 weeks; Call [phone]  Call your Health care provider after discharge from Rehab make a one week follow up appointment.  If you develop fever, chills, malaise, or change in mental status call your Health Care Provider or go to the Emergency Department.  Nutrition is important, eat small frequent meals to help ensure you get adequate calories.  Do not stay in bed all day!  Increase your activity daily as tolerated. You had a wound wash out of your right hip by Orthopedic surgeon due to infection.  Follow up with Orthopedic surgeon, Dr Be, in 1 week (while in Rehab).  Daily dressing change with dry sterile dressings 2x/day and as needed if saturated.  Continue with pain medication as prescribed. S/p wound washout by Ortho on 8/29.  Continue with antibiotic as prescribed. Use device to assist with walking such as cane and/or walker.  Use non skid rugs in your home.  Try to avoid clutter in your home You are on antibiotics until 10/29.  Follow up with ID clinic in 4-5 weeks.   If you develop a fever, burning on urination, difficulty voiding, call your healthcare provider. Call your Health Care provider upon arrival home to make a follow up appointment within one week.  Take all inhalers as prescribed by your Health Care Provider.  Take steroids as prescribed by your Health Care Provider.  If your cough increases infrequency and severity and/or you have shortness of breath or increased shortness of breath call your Health Care Provider.  If you develop fever, chills, night sweats, malaise, and/or change in mental status call your Health care Provider.  Nutrition is very important.  Eat small frequent meals.  Increase your activity as tolerated.  Do not stay in bed all day Continue with medications as prescribed.

## 2018-08-31 NOTE — CHART NOTE - NSCHARTNOTEFT_GEN_A_CORE
Culture - Surgical Swab (08.29.18 @ 22:25)    -  Gentamicin: S 2 Should not be used as monotherapy    -  Linezolid: S 4    -  Oxacillin: R >2    -  Penicillin: R >8    -  Tetra/Doxy: S <=1    -  RIF- Rifampin: S <=1 Should not be used as monotherapy    -  Trimethoprim/Sulfamethoxazole: S <=0.5/9.5    -  Vancomycin: S 2    -  Ampicillin/Sulbactam: R <=8/4    -  Cefazolin: R <=4    -  Clindamycin: S <=0.25    -  Daptomycin: S 0.5    -  Erythromycin: R >4    Specimen Source: .Surgical Swab #3 Right Femur    Culture Results:   Few Methicillin resistant Staphylococcus aureus    Organism Identification: Methicillin resistant Staphylococcus aureus    Organism: Methicillin resistant Staphylococcus aureus    Method Type: DEVAN    MEDICATIONS  (STANDING):  ALBUTerol/ipratropium for Nebulization 3 milliLiter(s) Nebulizer every 6 hours  aspirin enteric coated 81 milliGRAM(s) Oral daily  buDESOnide   0.5 milliGRAM(s) Respule 0.5 milliGRAM(s) Inhalation two times a day  chlorhexidine 4% Liquid 1 Application(s) Topical <User Schedule>  enoxaparin Injectable 40 milliGRAM(s) SubCutaneous daily  folic acid 1 milliGRAM(s) Oral daily  gabapentin 300 milliGRAM(s) Oral three times a day  lactated ringers. 1000 milliLiter(s) (75 mL/Hr) IV Continuous <Continuous>  multivitamin/minerals 1 Tablet(s) Oral daily  nicotine - 21 mG/24Hr(s) Patch 1 patch Transdermal daily  oxyCODONE  ER Tablet 20 milliGRAM(s) Oral every 12 hours  pantoprazole    Tablet 40 milliGRAM(s) Oral before breakfast  polyethylene glycol 3350 17 Gram(s) Oral daily  QUEtiapine 150 milliGRAM(s) Oral at bedtime  rifampin IVPB      senna 2 Tablet(s) Oral at bedtime  thiamine 100 milliGRAM(s) Oral daily  traMADol 50 milliGRAM(s) Oral every 6 hours  vancomycin  IVPB 1000 milliGRAM(s) IV Intermittent every 12 hours    Plan  C/w Vancomycin  ID following    Amarilys Ansari NP  sprectTrenton Psychiatric Hospital [id number]

## 2018-08-31 NOTE — DISCHARGE NOTE ADULT - HOSPITAL COURSE
73 yo F, w/ PMH of emphysema, CAD, HTN, revision USAMA with ORIF for periprosthetic R femur fracture on 6/30/18 by Dr. Be, discharged on 7/3/18 to [facility], readmitted from 07/16 - 07/24/18 w/ worsening R hip pain and decreased ability to ambulate, now s/p right femur vancouver B1 periprosthetic fracture ORIF by Dr. Cornell on 07/19/18, BIBEMS s/p mechanical fall on stairs during the night c/o R hip and R leg pain, redness, swelling, stiffness. 71 yo F, w/ PMH of emphysema, CAD, HTN, revision USAMA with ORIF for periprosthetic R femur fracture on 6/30/18 by Dr. Be, discharged on 7/3/18 to [facility] rehab, readmitted from 07/16 - 07/24/18 w/ worsening R hip pain and decreased ability to ambulate, now s/p right femur vancouver B1 periprosthetic fracture ORIF by Dr. Cornell on 07/19/18, BIBEMS s/p mechanical fall on stairs during the night c/o R hip and R leg pain, redness, swelling, stiffness.     Treated for MRSA bacteremia & UTI 2/2 Right hip infection; S/p Complex irrigation and debridement on 08/29/2018 of right thigh hardware by Orthopedic surgery; Followed by ID; PICC inserted for long term IV Abx through 10/29/18; Medically cleared for discharge by Dr Quinones.

## 2018-08-31 NOTE — DISCHARGE NOTE ADULT - MEDICATION SUMMARY - MEDICATIONS TO STOP TAKING
I will STOP taking the medications listed below when I get home from the hospital:    docusate sodium 100 mg oral capsule  -- 1 cap(s) by mouth 2 times a day    NIFEdipine 90 mg oral tablet, extended release  -- 1 tab(s) by mouth once a day    hydrALAZINE 50 mg oral tablet  -- 1 tab(s) by mouth every 8 hours    enoxaparin  -- 40 milligram(s) subcutaneous once a day x 6 weeks total for dvt prevention

## 2018-08-31 NOTE — DIETITIAN INITIAL EVALUATION ADULT. - OTHER INFO
patient seen for length of stay. patient is aler and oriented, walking aroun unit. Patient makes her preferences known, feeds self, denies trouble chewing or swallowing. Patient eats well as stated and confirmed by RN Davide. Nettie regained some lost weight from previous admission in May 2018. patient does not follow a low sodium diet and states she will not follow one  once she returns home . Patient refuses diet education.  Patient with history of hip replacement 5/2018., admitted with hip pain

## 2018-08-31 NOTE — DISCHARGE NOTE ADULT - PATIENT PORTAL LINK FT
You can access the [facility] Patient Portal, offered by [facility], by registering with the following website: [email or web address]

## 2018-08-31 NOTE — DISCHARGE NOTE ADULT - CARE PLAN
Principal Discharge DX:	Hip pain, acute, right  Assessment and plan of treatment:	8/29: Had irrigation and debridement of Right thigh hardware  Secondary Diagnosis:	MRSA bacteremia  Assessment and plan of treatment:	Complete course of antibiotic therapy   Routine Picc line care  Secondary Diagnosis:	UTI (urinary tract infection)  Assessment and plan of treatment:	MRSA  Secondary Diagnosis:	COPD (chronic obstructive pulmonary disease)  Assessment and plan of treatment:	Continue with nebulizer  Secondary Diagnosis:	CAD (coronary artery disease)  Assessment and plan of treatment:	Continue with Aspirin Principal Discharge DX:	Hip pain, acute, right  Assessment and plan of treatment:	8/29: Had irrigation and debridement of Right thigh hardware  Secondary Diagnosis:	MRSA bacteremia  Assessment and plan of treatment:	Complete course of antibiotic therapy util 10/29  Routine Picc line care  Secondary Diagnosis:	UTI (urinary tract infection)  Assessment and plan of treatment:	MRSA  Secondary Diagnosis:	COPD (chronic obstructive pulmonary disease)  Assessment and plan of treatment:	Continue with nebulizer  Secondary Diagnosis:	CAD (coronary artery disease)  Assessment and plan of treatment:	Continue with Aspirin Principal Discharge DX:	MRSA bacteremia  Goal:	Infection resolves  Assessment and plan of treatment:	Continue with antibiotics through 10/29/18 via PICC.  Weekly CBC, CMP and vancomycin trough while on IV antibiotic.  Follow up with ID clinic in 4-5 weeks; Call [phone]  Call your Health care provider after discharge from Rehab make a one week follow up appointment.  If you develop fever, chills, malaise, or change in mental status call your Health Care Provider or go to the Emergency Department.  Nutrition is important, eat small frequent meals to help ensure you get adequate calories.  Do not stay in bed all day!  Increase your activity daily as tolerated.  Secondary Diagnosis:	Hip pain, acute, right  Assessment and plan of treatment:	You had a wound wash out of your right hip by Orthopedic surgeon due to infection.  Follow up with Orthopedic surgeon, Dr Be, in 1 week (while in Rehab).  Daily dressing change with dry sterile dressings 2x/day and as needed if saturated.  Continue with pain medication as prescribed.  Secondary Diagnosis:	Postoperative wound infection, sequela  Assessment and plan of treatment:	S/p wound washout by Ortho on 8/29.  Continue with antibiotic as prescribed.  Secondary Diagnosis:	Fall (on) (from) unspecified stairs and steps, initial encounter  Assessment and plan of treatment:	Use device to assist with walking such as cane and/or walker.  Use non skid rugs in your home.  Try to avoid clutter in your home  Secondary Diagnosis:	Acute cystitis without hematuria  Assessment and plan of treatment:	You are on antibiotics until 10/29.  Follow up with ID clinic in 4-5 weeks.   If you develop a fever, burning on urination, difficulty voiding, call your healthcare provider.  Secondary Diagnosis:	COPD (chronic obstructive pulmonary disease)  Assessment and plan of treatment:	Call your Health Care provider upon arrival home to make a follow up appointment within one week.  Take all inhalers as prescribed by your Health Care Provider.  Take steroids as prescribed by your Health Care Provider.  If your cough increases infrequency and severity and/or you have shortness of breath or increased shortness of breath call your Health Care Provider.  If you develop fever, chills, night sweats, malaise, and/or change in mental status call your Health care Provider.  Nutrition is very important.  Eat small frequent meals.  Increase your activity as tolerated.  Do not stay in bed all day  Secondary Diagnosis:	Anxiety  Assessment and plan of treatment:	Continue with medications as prescribed.

## 2018-08-31 NOTE — DIETITIAN INITIAL EVALUATION ADULT. - ENERGY NEEDS
IBW= 110  lbs +/- 10%  Chart review:  71 yo F, w/ PMH of emphysema, CAD, HTN, revision USAMA with ORIF for periprosthetic R femur fracture on 6/30/18 by Dr. Be, discharged on 7/3/18 to [facility], readmitted from 07/16/07/24/18 w/ worsening R hip pain and decreased ability to ambulate, now s/p right femur vancouver B1 periprosthetic fracture ORIF by Dr. Cornell on 07/19/18, BIBEMS s/p mechanical fall on stairs during the night c/o R hip and R leg pain, redness, swelling, stiffness.

## 2018-08-31 NOTE — CHART NOTE - NSCHARTNOTEFT_GEN_A_CORE
Notified by RN - Moderate amount of serosanguinous drainage from Rt thigh dressing.  Pt awake and alert, no c/o pain, vital signs stable, cbc stable   s/p irrigation and debridement Rt thigh hardware on 8/29  Discussed with Orthopedic- will see pt  f/u cbc, continue to monitor

## 2018-08-31 NOTE — DISCHARGE NOTE ADULT - CARE PROVIDER_API CALL
Bethany Waters), Internal Medicine  [address] [place] [address]  Phone: [phone]  Fax: [phone] Bethany Waters), Internal Medicine  [address] [facility]  [place] [address]  Phone: [phone]  Fax: [phone]    Megan Be), Orthopaedic Surgery  [address] [place] [address]  [address] [place] [address]  Phone: [phone]  Fax: [phone]

## 2018-08-31 NOTE — PROGRESS NOTE ADULT - SUBJECTIVE AND OBJECTIVE BOX
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    Interval History: pt went to OR s/o washout, OR cultures also with MRSa    ROS:      All other systems negative    Constitutional: no fever, no chills  Head: no trauma  Eyes: no vision changes, no eye pain  ENT:  no sore throat, no rhinorrhea  Cardiovascular:  no chest pain, no palpitation  Respiratory:  no SOB, no cough  GI:  no abd pain, no vomiting, no diarrhea  urinary: no dysuria, no hematuria, no flank pain  musculoskeletal:  right thigh and hip pain after the surgery  skin:  no rash  neurology:  no headache, no seizure, no change in mental status  psych: no anxiety, no depression           Allergies  No Known Allergies        ANTIMICROBIALS:  rifampin IVPB    vancomycin  IVPB 1000 every 12 hours      OTHER MEDS:  acetaminophen   Tablet 650 milliGRAM(s) Oral every 6 hours PRN  acetaminophen   Tablet. 650 milliGRAM(s) Oral every 6 hours PRN  ALBUTerol/ipratropium for Nebulization 3 milliLiter(s) Nebulizer every 6 hours  aspirin enteric coated 81 milliGRAM(s) Oral daily  buDESOnide   0.5 milliGRAM(s) Respule 0.5 milliGRAM(s) Inhalation two times a day  chlorhexidine 4% Liquid 1 Application(s) Topical <User Schedule>  enoxaparin Injectable 40 milliGRAM(s) SubCutaneous daily  folic acid 1 milliGRAM(s) Oral daily  gabapentin 300 milliGRAM(s) Oral three times a day  HYDROmorphone   Tablet 4 milliGRAM(s) Oral every 3 hours PRN  HYDROmorphone   Tablet 8 milliGRAM(s) Oral every 3 hours PRN  lactated ringers. 1000 milliLiter(s) IV Continuous <Continuous>  nicotine - 21 mG/24Hr(s) Patch 1 patch Transdermal daily  oxyCODONE  ER Tablet 20 milliGRAM(s) Oral every 12 hours  pantoprazole    Tablet 40 milliGRAM(s) Oral before breakfast  polyethylene glycol 3350 17 Gram(s) Oral daily  QUEtiapine 50 milliGRAM(s) Oral every 6 hours PRN  QUEtiapine 150 milliGRAM(s) Oral at bedtime  senna 2 Tablet(s) Oral at bedtime  thiamine 100 milliGRAM(s) Oral daily  traMADol 50 milliGRAM(s) Oral every 6 hours      Vital Signs Last 24 Hrs  T(C): 36.5 (31 Aug 2018 06:40), Max: 37.9 (30 Aug 2018 14:16)  T(F): 97.7 (31 Aug 2018 06:40), Max: 100.2 (30 Aug 2018 14:16)  HR: 78 (31 Aug 2018 06:40) (73 - 89)  BP: 135/64 (31 Aug 2018 06:40) (101/55 - 135/64)  BP(mean): --  RR: 18 (31 Aug 2018 06:40) (18 - 18)  SpO2: 94% (31 Aug 2018 06:40) (94% - 95%)    Physical Exam:  General:    NAD,  non toxic, walking  Head: atraumatic, normocephalic  Eye: normal sclera and conjunctiva  ENT:    no oropharyngeal lesions,   no LAD,   neck supple  Cardio:     regular S1, S2,  no murmur  Respiratory:    clear b/l,    no wheezing  abd:     soft,   BS +,   no tenderness,    no organomegaly  :   no CVAT,  no suprapubic tenderness,   no  edmondson  Musculoskeletal:   R thigh s/p I&D and wash out with edema but pt was walking  vascular: no lines, normal pulses  Skin:    no rash  Neurologic:     no focal deficit  psych: pt paranoid but now cooperative, asking for pain medications                            8.1    8.3   )-----------( 336      ( 30 Aug 2018 21:52 )             25.5       08-30    135  |  100  |  25<H>  ----------------------------<  97  4.3   |  24  |  0.87    Ca    9.1      30 Aug 2018 07:23            MICROBIOLOGY:  v  .Other Other, #3 Right Femur  08-29-18   Testing in progress  --  --      .Other Other, # 1 Right femur  08-29-18   Testing in progress  --  --      .Blood Blood-Peripheral  08-23-18   No growth at 5 days.  --  --      .Urine Clean Catch (Midstream)  08-23-18   50,000 - 99,000 CFU/mL Methicillin resistant Staphylococcus aureus  --  Methicillin resistant Staphylococcus aureus      .Blood Blood  08-23-18   Growth in aerobic bottle: Coag Negative Staphylococcus  Single set isolate, possible contaminant. Contact  Microbiology if susceptibility testing clinically  indicated.  --    Growth in aerobic bottle: Gram Positive Cocci in Clusters      .Blood Blood  08-22-18   Growth in aerobic bottle: Methicillin resistant Staphylococcus aureus    .Urine Catheterized  08-20-18   No growth  --  --                RADIOLOGY:  Images below reviewed personally  < from: MR Femur No Cont, Right (08.24.18 @ 22:08) >  IMPRESSION:  Status post right hip revision with susceptibility artifact.  Complex fluid collection along the anterolateral femur adjacent to the   hardware, which may represent a hematoma and/or infection. Additional   subcutaneous collection along the proximal lateral right thigh. Bilateral   knee joint effusions.

## 2018-08-31 NOTE — DISCHARGE NOTE ADULT - MEDICATION SUMMARY - MEDICATIONS TO TAKE
I will START or STAY ON the medications listed below when I get home from the hospital:    budesonide 0.5 mg/2 mL inhalation suspension  -- 2 milliliter(s) inhaled 2 times a day  -- Indication: For COPD (chronic obstructive pulmonary disease)    aspirin 81 mg oral delayed release tablet  -- 1 tab(s) by mouth once a day  -- Indication: For CAD (coronary artery disease)    oxyCODONE 20 mg oral tablet, extended release  -- 1 tab(s) by mouth every 12 hours  -- Indication: For Chronic pain    HYDROmorphone 4 mg oral tablet  -- 1 tab(s) by mouth every 3 hours, As needed, Moderate Pain (4 - 6)  -- Indication: For Pain of right hip    HYDROmorphone 8 mg oral tablet  -- 1 tab(s) by mouth every 3 hours, As needed, Severe Pain (7 - 10)  -- Indication: For Pain of right hip    acetaminophen 325 mg oral tablet  -- 2 tab(s) by mouth every 6 hours, As needed, Mild Pain (1 - 3) or fever  -- Indication: For Pain or fever    traMADol 50 mg oral tablet  -- 1 tab(s) by mouth every 6 hours  -- Indication: For Chronic pain    gabapentin 300 mg oral capsule  -- 1 cap(s) by mouth 3 times a day  -- Indication: For Pain of right hip    QUEtiapine 50 mg oral tablet  -- 1 tab(s) by mouth every 6 hours, As needed, anxiety/insomnia  -- Indication: For Mood disorder    QUEtiapine 50 mg oral tablet  -- 3 tab(s) by mouth once a day (at bedtime)  -- Indication: For Mood disorder    rifAMPin 600 mg intravenous injection  -- 600 milligram(s) intravenous every 6 hours  -- Indication: For MRSA bacteremia    vancomycin 1 g intravenous injection  -- 1 gram(s) intravenous every 12 hours  -- Indication: For MRSA bacteremia    polyethylene glycol 3350 oral powder for reconstitution  -- 17 gram(s) by mouth once a day  -- Indication: For Constipation    senna oral tablet  -- 2 tab(s) by mouth once a day (at bedtime)  -- Indication: For Cosntipation    lactobacillus acidophilus oral capsule  -- 1 cap(s) by mouth once a day with a meal  -- Indication: For GI prophylaxis    pantoprazole 40 mg oral delayed release tablet  -- 1 tab(s) by mouth once a day (before a meal)  -- Indication: For Reflux/Indigestion    nicotine 21 mg/24 hr transdermal film, extended release  -- 1 patch by transdermal patch once a day  -- Indication: For Smoking cessation    Multiple Vitamins with Minerals oral tablet  -- 1 tab(s) by mouth once a day  -- Indication: For Supplement    thiamine 100 mg oral tablet  -- 1 tab(s) by mouth once a day  -- Indication: For Vitamin B1 supplement    ascorbic acid 500 mg oral tablet  -- 1 tab(s) by mouth 2 times a day  -- Indication: For Supplement    folic acid 1 mg oral tablet  -- 1 tab(s) by mouth once a day  -- Indication: For Supplement I will START or STAY ON the medications listed below when I get home from the hospital:    budesonide 0.5 mg/2 mL inhalation suspension  -- 2 milliliter(s) inhaled 2 times a day  -- Indication: For COPD (chronic obstructive pulmonary disease)    aspirin 81 mg oral delayed release tablet  -- 1 tab(s) by mouth once a day  -- Indication: For CAD (coronary artery disease)    oxyCODONE 20 mg oral tablet, extended release  -- 1 tab(s) by mouth every 12 hours  -- Indication: For Chronic pain    HYDROmorphone 4 mg oral tablet  -- 1 tab(s) by mouth every 3 hours, As needed, Moderate Pain (4 - 6)  -- Indication: For Pain of right hip    HYDROmorphone 8 mg oral tablet  -- 1 tab(s) by mouth every 3 hours, As needed, Severe Pain (7 - 10)  -- Indication: For Pain of right hip    acetaminophen 325 mg oral tablet  -- 2 tab(s) by mouth every 6 hours, As needed, Mild Pain (1 - 3) or fever  -- Indication: For Pain or fever    traMADol 50 mg oral tablet  -- 1 tab(s) by mouth every 6 hours  -- Indication: For Chronic pain    gabapentin 300 mg oral capsule  -- 1 cap(s) by mouth 3 times a day  -- Indication: For Pain of right hip    QUEtiapine 50 mg oral tablet  -- 1 tab(s) by mouth every 6 hours, As needed, anxiety/insomnia  -- Indication: For Mood disorder    QUEtiapine 50 mg oral tablet  -- 3 tab(s) by mouth once a day (at bedtime)  -- Indication: For Mood disorder    rifAMPin 600 mg intravenous injection  -- 1 dose(s) intravenous once a day through 10/29/18  -- Indication: For MRSA bacteremia    vancomycin 1 g intravenous injection  -- 1 gram(s) intravenous every 12 hours  -- Indication: For MRSA bacteremia    polyethylene glycol 3350 oral powder for reconstitution  -- 17 gram(s) by mouth once a day  -- Indication: For Constipation    senna oral tablet  -- 2 tab(s) by mouth once a day (at bedtime)  -- Indication: For Cosntipation    lactobacillus acidophilus oral capsule  -- 1 cap(s) by mouth once a day with a meal  -- Indication: For GI prophylaxis    pantoprazole 40 mg oral delayed release tablet  -- 1 tab(s) by mouth once a day (before a meal)  -- Indication: For Reflux/Indigestion    nicotine 21 mg/24 hr transdermal film, extended release  -- 1 patch by transdermal patch once a day  -- Indication: For Smoking cessation    Multiple Vitamins with Minerals oral tablet  -- 1 tab(s) by mouth once a day  -- Indication: For Supplement    thiamine 100 mg oral tablet  -- 1 tab(s) by mouth once a day  -- Indication: For Vitamin B1 supplement    ascorbic acid 500 mg oral tablet  -- 1 tab(s) by mouth 2 times a day  -- Indication: For Supplement    folic acid 1 mg oral tablet  -- 1 tab(s) by mouth once a day  -- Indication: For Supplement

## 2018-08-31 NOTE — PROVIDER CONTACT NOTE (CRITICAL VALUE NOTIFICATION) - BACKGROUND
pt admitted for right hip pain
71 yo female with right hip bone infection.  Surgical site right hip with dressing and ace wrap - orthopedics to change dressing.
pt admitted for r hip pain

## 2018-08-31 NOTE — DISCHARGE NOTE ADULT - SECONDARY DIAGNOSIS.
MRSA bacteremia UTI (urinary tract infection) COPD (chronic obstructive pulmonary disease) CAD (coronary artery disease) Hip pain, acute, right Postoperative wound infection, sequela Fall (on) (from) unspecified stairs and steps, initial encounter Acute cystitis without hematuria Anxiety

## 2018-08-31 NOTE — PROGRESS NOTE ADULT - SUBJECTIVE AND OBJECTIVE BOX
73 yo F, w/ PMH of emphysema, CAD, HTN, revision USAMA with ORIF for periprosthetic R femur fracture on 6/30/18 by Dr. Be, discharged on 7/3/18 to [facility] rehab, readmitted from 07/16/07/24/18 w/ worsening R hip pain and decreased ability to ambulate, now s/p right femur [place] B1 periprosthetic fracture ORIF by Dr. Cornell on 07/19/18, BIBEMS s/p mechanical fall on stairs during the night c/o R hip and R leg pain, redness, swelling, stiffness. Patient denies other complaints. Denies headache, neck stiffness, fever/chills, vision change, chest pain, shortness of breath, difficulty breathing, palpitations, weakness, dizziness, nausea, vomiting, diarrhea, syncope.   From prior, note, patient had initial right hip fracture surgery at [facility] with a hip pin placement in 2015.  She developed avascular necrosis, necessitating a right total hip replacement at [facility] in 2017.  She was well for one year and had an accidental fall taking out her garbage June, 2018. She was then diagnosed with a periprosthetic fracture. Repair consisted of lengthening the right total hip replacement and then stabilization with the distal femur.  At rehabilitation, she had a nontraumatic separation of this distal stabilization which required a periprosthetic revision. Patients/p revision of right hip surgery as of 07/19/18. Patient present with fever and unclrear etiology.  She presented to the ED on 8/20 with R hip and thigh pain, swelling, stiffness, and redness in the setting of a mechanical fall on the stairs.  Then in the hospital spiked to 102.9, blood cx with MRSA  LE CT with lucency at the acetabular screw and prox femur, fluid collection in lateral soft tissue of thigh, an other fluid collection next to vastus intermedius.  Will need biopsy or drainage of the fluid collection. Cultures now show MRSA in Blood  and Urine. Needed procedure to deterine if there is  MRSA IN THE FLUID COLLECTION IN THE LEG. Patient seen today s/p washout of the right leg. Patient found to have anemia probably due to acute blood loss post op.       MEDICATIONS  (STANDING):  ALBUTerol/ipratropium for Nebulization 3 milliLiter(s) Nebulizer every 6 hours  aspirin enteric coated 81 milliGRAM(s) Oral daily  buDESOnide   0.5 milliGRAM(s) Respule 0.5 milliGRAM(s) Inhalation two times a day  chlorhexidine 4% Liquid 1 Application(s) Topical <User Schedule>  enoxaparin Injectable 40 milliGRAM(s) SubCutaneous daily  folic acid 1 milliGRAM(s) Oral daily  gabapentin 300 milliGRAM(s) Oral three times a day  lactated ringers. 1000 milliLiter(s) (75 mL/Hr) IV Continuous <Continuous>  nicotine - 21 mG/24Hr(s) Patch 1 patch Transdermal daily  oxyCODONE  ER Tablet 20 milliGRAM(s) Oral every 12 hours  pantoprazole    Tablet 40 milliGRAM(s) Oral before breakfast  polyethylene glycol 3350 17 Gram(s) Oral daily  QUEtiapine 150 milliGRAM(s) Oral at bedtime  senna 2 Tablet(s) Oral at bedtime  thiamine 100 milliGRAM(s) Oral daily  traMADol 50 milliGRAM(s) Oral every 6 hours  vancomycin  IVPB 1000 milliGRAM(s) IV Intermittent every 12 hours    MEDICATIONS  (PRN):  acetaminophen   Tablet 650 milliGRAM(s) Oral every 6 hours PRN For Temp greater than 38.5 C (101.3 F)  acetaminophen   Tablet. 650 milliGRAM(s) Oral every 6 hours PRN Mild Pain (1 - 3)  HYDROmorphone   Tablet 4 milliGRAM(s) Oral every 3 hours PRN Moderate Pain (4 - 6)  HYDROmorphone   Tablet 8 milliGRAM(s) Oral every 3 hours PRN Severe Pain (7 - 10)  QUEtiapine 50 milliGRAM(s) Oral every 6 hours PRN anxiety/insomnia          VITALS:   T(C): 37.1 (08-30-18 @ 18:17), Max: 37.9 (08-30-18 @ 14:16)  HR: 89 (08-30-18 @ 18:17) (67 - 89)  BP: 101/55 (08-30-18 @ 18:17) (101/55 - 118/65)  RR: 18 (08-30-18 @ 18:17) (18 - 18)  SpO2: 95% (08-30-18 @ 18:17) (94% - 97%)  Wt(kg): --      PHYSICAL EXAM:  GENERAL: NAD, well nourished and conversant  HEAD:  Atraumatic  EYES: EOM, PERRLA, conjunctiva pink and sclera white  ENT: No tonsillar erythema, exudates, or enlargement, moist mucous membranes, good dentition, no lesions  NECK: Supple, No JVD, normal thyroid, carotids with normal upstrokes and no bruits  CHEST/LUNG: soft rales at the left base  HEART: Regular rate and rhythm, No murmurs, rubs, or gallops  ABDOMEN: Soft, nondistended, no masses, guarding, tenderness or rebound, bowel sounds present  EXTREMITIES:  2+ Peripheral Pulses, No clubbing, cyanosis, or edema. (+) right periprosthetic fracture site edema  LYMPH: No lymphadenopathy noted  SKIN: No rashes or lesions  NERVOUS SYSTEM:  Alert & Oriented X3, normal cognitive function. Motor Strength 5/5 right upper and right lower.  5/5 left upper and left lower extremities, DTRs 2+ intact and symmetric    LABS:        CBC Full  -  ( 30 Aug 2018 12:57 )  WBC Count : 7.5 K/uL  Hemoglobin : 6.2 g/dL  Hematocrit : 19.9 %  Platelet Count - Automated : 330 K/uL  Mean Cell Volume : 89.9 fl  Mean Cell Hemoglobin : 27.9 pg  Mean Cell Hemoglobin Concentration : 31.1 gm/dL  Auto Neutrophil # : x  Auto Lymphocyte # : x  Auto Monocyte # : x  Auto Eosinophil # : x  Auto Basophil # : x  Auto Neutrophil % : x  Auto Lymphocyte % : x  Auto Monocyte % : x  Auto Eosinophil % : x  Auto Basophil % : x    08-30    135  |  100  |  25<H>  ----------------------------<  97  4.3   |  24  |  0.87    Ca    9.1      30 Aug 2018 07:23    TPro  7.0  /  Alb  2.7<L>  /  TBili  0.2  /  DBili  x   /  AST  29  /  ALT  27  /  AlkPhos  130<H>  08-29    LIVER FUNCTIONS - ( 29 Aug 2018 07:20 )  Alb: 2.7 g/dL / Pro: 7.0 g/dL / ALK PHOS: 130 U/L / ALT: 27 U/L / AST: 29 U/L / GGT: x           PT/INR - ( 29 Aug 2018 09:10 )   PT: 13.2 sec;   INR: 1.16 ratio         PTT - ( 29 Aug 2018 09:10 )  PTT:28.7 sec    CAPILLARY BLOOD GLUCOSE          RADIOLOGY & ADDITIONAL TESTS:          MEDICATIONS  (STANDING):  ALBUTerol/ipratropium for Nebulization 3 milliLiter(s) Nebulizer every 6 hours  aspirin enteric coated 81 milliGRAM(s) Oral daily  buDESOnide   0.5 milliGRAM(s) Respule 0.5 milliGRAM(s) Inhalation two times a day  chlorhexidine 4% Liquid 1 Application(s) Topical <User Schedule>  enoxaparin Injectable 40 milliGRAM(s) SubCutaneous daily  folic acid 1 milliGRAM(s) Oral daily  gabapentin 300 milliGRAM(s) Oral three times a day  lactated ringers. 1000 milliLiter(s) (75 mL/Hr) IV Continuous <Continuous>  nicotine - 21 mG/24Hr(s) Patch 1 patch Transdermal daily  oxyCODONE  ER Tablet 20 milliGRAM(s) Oral every 12 hours  pantoprazole    Tablet 40 milliGRAM(s) Oral before breakfast  polyethylene glycol 3350 17 Gram(s) Oral daily  QUEtiapine 150 milliGRAM(s) Oral at bedtime  senna 2 Tablet(s) Oral at bedtime  thiamine 100 milliGRAM(s) Oral daily  traMADol 50 milliGRAM(s) Oral every 6 hours  vancomycin  IVPB 1000 milliGRAM(s) IV Intermittent every 12 hours    MEDICATIONS  (PRN):  acetaminophen   Tablet 650 milliGRAM(s) Oral every 6 hours PRN For Temp greater than 38.5 C (101.3 F)  acetaminophen   Tablet. 650 milliGRAM(s) Oral every 6 hours PRN Mild Pain (1 - 3)  HYDROmorphone   Tablet 4 milliGRAM(s) Oral every 3 hours PRN Moderate Pain (4 - 6)  HYDROmorphone   Tablet 8 milliGRAM(s) Oral every 3 hours PRN Severe Pain (7 - 10)  QUEtiapine 50 milliGRAM(s) Oral every 6 hours PRN anxiety/insomnia          VITALS:   T(C): 37.1 (08-30-18 @ 18:17), Max: 37.9 (08-30-18 @ 14:16)  HR: 89 (08-30-18 @ 18:17) (67 - 89)  BP: 101/55 (08-30-18 @ 18:17) (101/55 - 118/65)  RR: 18 (08-30-18 @ 18:17) (18 - 18)  SpO2: 95% (08-30-18 @ 18:17) (94% - 97%)  Wt(kg): --        LABS:        CBC Full  -  ( 30 Aug 2018 12:57 )  WBC Count : 7.5 K/uL  Hemoglobin : 6.2 g/dL  Hematocrit : 19.9 %  Platelet Count - Automated : 330 K/uL  Mean Cell Volume : 89.9 fl  Mean Cell Hemoglobin : 27.9 pg  Mean Cell Hemoglobin Concentration : 31.1 gm/dL  Auto Neutrophil # : x  Auto Lymphocyte # : x  Auto Monocyte # : x  Auto Eosinophil # : x  Auto Basophil # : x  Auto Neutrophil % : x  Auto Lymphocyte % : x  Auto Monocyte % : x  Auto Eosinophil % : x  Auto Basophil % : x    08-30    135  |  100  |  25<H>  ----------------------------<  97  4.3   |  24  |  0.87    Ca    9.1      30 Aug 2018 07:23    TPro  7.0  /  Alb  2.7<L>  /  TBili  0.2  /  DBili  x   /  AST  29  /  ALT  27  /  AlkPhos  130<H>  08-29    LIVER FUNCTIONS - ( 29 Aug 2018 07:20 )  Alb: 2.7 g/dL / Pro: 7.0 g/dL / ALK PHOS: 130 U/L / ALT: 27 U/L / AST: 29 U/L / GGT: x           PT/INR - ( 29 Aug 2018 09:10 )   PT: 13.2 sec;   INR: 1.16 ratio         PTT - ( 29 Aug 2018 09:10 )  PTT:28.7 sec    CAPILLARY BLOOD GLUCOSE          RADIOLOGY & ADDITIONAL TESTS: 71 yo F, w/ PMH of emphysema, CAD, HTN, revision USAMA with ORIF for periprosthetic R femur fracture on 6/30/18 by Dr. Be, discharged on 7/3/18 to [facility] rehab, readmitted from 07/16/07/24/18 w/ worsening R hip pain and decreased ability to ambulate, now s/p right femur [place] B1 periprosthetic fracture ORIF by Dr. Cornell on 07/19/18, BIBEMS s/p mechanical fall on stairs during the night c/o R hip and R leg pain, redness, swelling, stiffness. Patient denies other complaints. Denies headache, neck stiffness, fever/chills, vision change, chest pain, shortness of breath, difficulty breathing, palpitations, weakness, dizziness, nausea, vomiting, diarrhea, syncope.   From prior, note, patient had initial right hip fracture surgery at [facility] with a hip pin placement in 2015.  She developed avascular necrosis, necessitating a right total hip replacement at [facility] in 2017.  She was well for one year and had an accidental fall taking out her garbage June, 2018. She was then diagnosed with a periprosthetic fracture. Repair consisted of lengthening the right total hip replacement and then stabilization with the distal femur.  At rehabilitation, she had a nontraumatic separation of this distal stabilization which required a periprosthetic revision. Patients/p revision of right hip surgery as of 07/19/18. Patient present with fever and unclrear etiology.  She presented to the ED on 8/20 with R hip and thigh pain, swelling, stiffness, and redness in the setting of a mechanical fall on the stairs.  Then in the hospital spiked to 102.9, blood cx with MRSA  LE CT with lucency at the acetabular screw and prox femur, fluid collection in lateral soft tissue of thigh, an other fluid collection next to vastus intermedius.  Will need biopsy or drainage of the fluid collection. Cultures now show MRSA in Blood  and Urine. Needed procedure to deterine if there is  MRSA IN THE FLUID COLLECTION IN THE LEG. Patient seen today s/p washout of the right leg. Patient found to have anemia probably due to acute blood loss post op.       MEDICATIONS  (STANDING):  ALBUTerol/ipratropium for Nebulization 3 milliLiter(s) Nebulizer every 6 hours  aspirin enteric coated 81 milliGRAM(s) Oral daily  buDESOnide   0.5 milliGRAM(s) Respule 0.5 milliGRAM(s) Inhalation two times a day  chlorhexidine 4% Liquid 1 Application(s) Topical <User Schedule>  enoxaparin Injectable 40 milliGRAM(s) SubCutaneous daily  folic acid 1 milliGRAM(s) Oral daily  gabapentin 300 milliGRAM(s) Oral three times a day  lactated ringers. 1000 milliLiter(s) (75 mL/Hr) IV Continuous <Continuous>  nicotine - 21 mG/24Hr(s) Patch 1 patch Transdermal daily  oxyCODONE  ER Tablet 20 milliGRAM(s) Oral every 12 hours  pantoprazole    Tablet 40 milliGRAM(s) Oral before breakfast  polyethylene glycol 3350 17 Gram(s) Oral daily  QUEtiapine 150 milliGRAM(s) Oral at bedtime  senna 2 Tablet(s) Oral at bedtime  thiamine 100 milliGRAM(s) Oral daily  traMADol 50 milliGRAM(s) Oral every 6 hours  vancomycin  IVPB 1000 milliGRAM(s) IV Intermittent every 12 hours    MEDICATIONS  (PRN):  acetaminophen   Tablet 650 milliGRAM(s) Oral every 6 hours PRN For Temp greater than 38.5 C (101.3 F)  acetaminophen   Tablet. 650 milliGRAM(s) Oral every 6 hours PRN Mild Pain (1 - 3)  HYDROmorphone   Tablet 4 milliGRAM(s) Oral every 3 hours PRN Moderate Pain (4 - 6)  HYDROmorphone   Tablet 8 milliGRAM(s) Oral every 3 hours PRN Severe Pain (7 - 10)  QUEtiapine 50 milliGRAM(s) Oral every 6 hours PRN anxiety/insomnia             Vital Signs Last 24 Hrs  T(C): 36.5 (31 Aug 2018 06:40), Max: 37.9 (30 Aug 2018 14:16)  T(F): 97.7 (31 Aug 2018 06:40), Max: 100.2 (30 Aug 2018 14:16)  HR: 78 (31 Aug 2018 06:40) (73 - 89)  BP: 135/64 (31 Aug 2018 06:40) (101/55 - 135/64)  BP(mean): --  RR: 18 (31 Aug 2018 06:40) (18 - 18)  SpO2: 94% (31 Aug 2018 06:40) (94% - 95%)      PHYSICAL EXAM:  GENERAL: NAD, well nourished and conversant  HEAD:  Atraumatic  EYES: EOM, PERRLA, conjunctiva pink and sclera white  ENT: No tonsillar erythema, exudates, or enlargement, moist mucous membranes, good dentition, no lesions  NECK: Supple, No JVD, normal thyroid, carotids with normal upstrokes and no bruits  CHEST/LUNG: soft rales at the left base  HEART: Regular rate and rhythm, No murmurs, rubs, or gallops  ABDOMEN: Soft, nondistended, no masses, guarding, tenderness or rebound, bowel sounds present  EXTREMITIES:  2+ Peripheral Pulses, No clubbing, cyanosis, or edema. (+) right periprosthetic fracture site edema  LYMPH: No lymphadenopathy noted  SKIN: No rashes or lesions  NERVOUS SYSTEM:  Alert & Oriented X3, normal cognitive function. Motor Strength 5/5 right upper and right lower.  5/5 left upper and left lower extremities, DTRs 2+ intact and symmetric    LABS:            CAPILLARY BLOOD GLUCOSE          RADIOLOGY & ADDITIONAL TESTS:          MEDICATIONS  (STANDING):  ALBUTerol/ipratropium for Nebulization 3 milliLiter(s) Nebulizer every 6 hours  aspirin enteric coated 81 milliGRAM(s) Oral daily  buDESOnide   0.5 milliGRAM(s) Respule 0.5 milliGRAM(s) Inhalation two times a day  chlorhexidine 4% Liquid 1 Application(s) Topical <User Schedule>  enoxaparin Injectable 40 milliGRAM(s) SubCutaneous daily  folic acid 1 milliGRAM(s) Oral daily  gabapentin 300 milliGRAM(s) Oral three times a day  lactated ringers. 1000 milliLiter(s) (75 mL/Hr) IV Continuous <Continuous>  nicotine - 21 mG/24Hr(s) Patch 1 patch Transdermal daily  oxyCODONE  ER Tablet 20 milliGRAM(s) Oral every 12 hours  pantoprazole    Tablet 40 milliGRAM(s) Oral before breakfast  polyethylene glycol 3350 17 Gram(s) Oral daily  QUEtiapine 150 milliGRAM(s) Oral at bedtime  senna 2 Tablet(s) Oral at bedtime  thiamine 100 milliGRAM(s) Oral daily  traMADol 50 milliGRAM(s) Oral every 6 hours  vancomycin  IVPB 1000 milliGRAM(s) IV Intermittent every 12 hours    MEDICATIONS  (PRN):  acetaminophen   Tablet 650 milliGRAM(s) Oral every 6 hours PRN For Temp greater than 38.5 C (101.3 F)  acetaminophen   Tablet. 650 milliGRAM(s) Oral every 6 hours PRN Mild Pain (1 - 3)  HYDROmorphone   Tablet 4 milliGRAM(s) Oral every 3 hours PRN Moderate Pain (4 - 6)  HYDROmorphone   Tablet 8 milliGRAM(s) Oral every 3 hours PRN Severe Pain (7 - 10)  QUEtiapine 50 milliGRAM(s) Oral every 6 hours PRN anxiety/insomnia          VITALS:   T(C): 37.1 (08-30-18 @ 18:17), Max: 37.9 (08-30-18 @ 14:16)  HR: 89 (08-30-18 @ 18:17) (67 - 89)  BP: 101/55 (08-30-18 @ 18:17) (101/55 - 118/65)  RR: 18 (08-30-18 @ 18:17) (18 - 18)  SpO2: 95% (08-30-18 @ 18:17) (94% - 97%)  Wt(kg): --        LABS:                          8.1    8.3   )-----------( 336      ( 30 Aug 2018 21:52 )             25.5       08-30    135  |  100  |  25<H>  ----------------------------<  97  4.3   |  24  |  0.87    Ca    9.1      30 Aug 2018 07:23      CAPILLARY BLOOD GLUCOSE          RADIOLOGY & ADDITIONAL TESTS:

## 2018-08-31 NOTE — DISCHARGE NOTE ADULT - ADDITIONAL INSTRUCTIONS
Follow up with your Primary care doctor  Follow up with infectious disease Follow up with Orthopedic in 1 week.  Follow up with Infectious disease clinic in 4-5 weeks.  Follow up with your primary healthcare provide 1 week after discharge from Rehab.

## 2018-09-01 DIAGNOSIS — K59.04 CHRONIC IDIOPATHIC CONSTIPATION: ICD-10-CM

## 2018-09-01 LAB
ANION GAP SERPL CALC-SCNC: 11 MMOL/L — SIGNIFICANT CHANGE UP (ref 5–17)
BUN SERPL-MCNC: 20 MG/DL — SIGNIFICANT CHANGE UP (ref 7–23)
CALCIUM SERPL-MCNC: 9.3 MG/DL — SIGNIFICANT CHANGE UP (ref 8.4–10.5)
CHLORIDE SERPL-SCNC: 101 MMOL/L — SIGNIFICANT CHANGE UP (ref 96–108)
CO2 SERPL-SCNC: 26 MMOL/L — SIGNIFICANT CHANGE UP (ref 22–31)
CREAT SERPL-MCNC: 0.85 MG/DL — SIGNIFICANT CHANGE UP (ref 0.5–1.3)
GLUCOSE SERPL-MCNC: 89 MG/DL — SIGNIFICANT CHANGE UP (ref 70–99)
HCT VFR BLD CALC: 26 % — LOW (ref 34.5–45)
HGB BLD-MCNC: 8.1 G/DL — LOW (ref 11.5–15.5)
MCHC RBC-ENTMCNC: 26.9 PG — LOW (ref 27–34)
MCHC RBC-ENTMCNC: 31.2 GM/DL — LOW (ref 32–36)
MCV RBC AUTO: 86.4 FL — SIGNIFICANT CHANGE UP (ref 80–100)
PLATELET # BLD AUTO: 317 K/UL — SIGNIFICANT CHANGE UP (ref 150–400)
POTASSIUM SERPL-MCNC: 4.4 MMOL/L — SIGNIFICANT CHANGE UP (ref 3.5–5.3)
POTASSIUM SERPL-SCNC: 4.4 MMOL/L — SIGNIFICANT CHANGE UP (ref 3.5–5.3)
RBC # BLD: 3.01 M/UL — LOW (ref 3.8–5.2)
RBC # FLD: 20.9 % — HIGH (ref 10.3–14.5)
SODIUM SERPL-SCNC: 138 MMOL/L — SIGNIFICANT CHANGE UP (ref 135–145)
WBC # BLD: 6.45 K/UL — SIGNIFICANT CHANGE UP (ref 3.8–10.5)
WBC # FLD AUTO: 6.45 K/UL — SIGNIFICANT CHANGE UP (ref 3.8–10.5)

## 2018-09-01 PROCEDURE — 99232 SBSQ HOSP IP/OBS MODERATE 35: CPT

## 2018-09-01 RX ORDER — ACETAMINOPHEN 500 MG
650 TABLET ORAL ONCE
Qty: 0 | Refills: 0 | Status: COMPLETED | OUTPATIENT
Start: 2018-09-01 | End: 2018-09-01

## 2018-09-01 RX ORDER — PETROLATUM,WHITE
1 JELLY (GRAM) TOPICAL
Qty: 0 | Refills: 0 | Status: DISCONTINUED | OUTPATIENT
Start: 2018-09-01 | End: 2018-09-01

## 2018-09-01 RX ORDER — POLYETHYLENE GLYCOL 3350 17 G/17G
17 POWDER, FOR SOLUTION ORAL DAILY
Qty: 0 | Refills: 0 | Status: DISCONTINUED | OUTPATIENT
Start: 2018-09-01 | End: 2018-09-07

## 2018-09-01 RX ORDER — LACTOBACILLUS ACIDOPHILUS 100MM CELL
1 CAPSULE ORAL
Qty: 0 | Refills: 0 | Status: DISCONTINUED | OUTPATIENT
Start: 2018-09-01 | End: 2018-09-07

## 2018-09-01 RX ADMIN — TRAMADOL HYDROCHLORIDE 50 MILLIGRAM(S): 50 TABLET ORAL at 23:30

## 2018-09-01 RX ADMIN — Medication 3 MILLILITER(S): at 11:34

## 2018-09-01 RX ADMIN — Medication 250 MILLIGRAM(S): at 21:12

## 2018-09-01 RX ADMIN — Medication 1 TABLET(S): at 14:46

## 2018-09-01 RX ADMIN — TRAMADOL HYDROCHLORIDE 50 MILLIGRAM(S): 50 TABLET ORAL at 00:39

## 2018-09-01 RX ADMIN — HYDROMORPHONE HYDROCHLORIDE 8 MILLIGRAM(S): 2 INJECTION INTRAMUSCULAR; INTRAVENOUS; SUBCUTANEOUS at 18:30

## 2018-09-01 RX ADMIN — HYDROMORPHONE HYDROCHLORIDE 8 MILLIGRAM(S): 2 INJECTION INTRAMUSCULAR; INTRAVENOUS; SUBCUTANEOUS at 17:01

## 2018-09-01 RX ADMIN — Medication 1 PATCH: at 11:33

## 2018-09-01 RX ADMIN — Medication 100 MILLIGRAM(S): at 14:47

## 2018-09-01 RX ADMIN — Medication 0.5 MILLIGRAM(S): at 18:00

## 2018-09-01 RX ADMIN — Medication 250 MILLIGRAM(S): at 09:22

## 2018-09-01 RX ADMIN — OXYCODONE HYDROCHLORIDE 20 MILLIGRAM(S): 5 TABLET ORAL at 06:29

## 2018-09-01 RX ADMIN — Medication 1 TABLET(S): at 11:36

## 2018-09-01 RX ADMIN — ENOXAPARIN SODIUM 40 MILLIGRAM(S): 100 INJECTION SUBCUTANEOUS at 11:33

## 2018-09-01 RX ADMIN — QUETIAPINE FUMARATE 150 MILLIGRAM(S): 200 TABLET, FILM COATED ORAL at 21:14

## 2018-09-01 RX ADMIN — POLYETHYLENE GLYCOL 3350 17 GRAM(S): 17 POWDER, FOR SOLUTION ORAL at 11:33

## 2018-09-01 RX ADMIN — Medication 650 MILLIGRAM(S): at 09:17

## 2018-09-01 RX ADMIN — TRAMADOL HYDROCHLORIDE 50 MILLIGRAM(S): 50 TABLET ORAL at 11:33

## 2018-09-01 RX ADMIN — OXYCODONE HYDROCHLORIDE 20 MILLIGRAM(S): 5 TABLET ORAL at 17:59

## 2018-09-01 RX ADMIN — GABAPENTIN 300 MILLIGRAM(S): 400 CAPSULE ORAL at 13:41

## 2018-09-01 RX ADMIN — Medication 81 MILLIGRAM(S): at 11:33

## 2018-09-01 RX ADMIN — TRAMADOL HYDROCHLORIDE 50 MILLIGRAM(S): 50 TABLET ORAL at 18:30

## 2018-09-01 RX ADMIN — TRAMADOL HYDROCHLORIDE 50 MILLIGRAM(S): 50 TABLET ORAL at 23:00

## 2018-09-01 RX ADMIN — HYDROMORPHONE HYDROCHLORIDE 8 MILLIGRAM(S): 2 INJECTION INTRAMUSCULAR; INTRAVENOUS; SUBCUTANEOUS at 21:19

## 2018-09-01 RX ADMIN — GABAPENTIN 300 MILLIGRAM(S): 400 CAPSULE ORAL at 21:14

## 2018-09-01 RX ADMIN — TRAMADOL HYDROCHLORIDE 50 MILLIGRAM(S): 50 TABLET ORAL at 17:59

## 2018-09-01 RX ADMIN — Medication 650 MILLIGRAM(S): at 17:01

## 2018-09-01 RX ADMIN — OXYCODONE HYDROCHLORIDE 20 MILLIGRAM(S): 5 TABLET ORAL at 07:09

## 2018-09-01 RX ADMIN — Medication 650 MILLIGRAM(S): at 10:00

## 2018-09-01 RX ADMIN — TRAMADOL HYDROCHLORIDE 50 MILLIGRAM(S): 50 TABLET ORAL at 00:09

## 2018-09-01 RX ADMIN — HYDROMORPHONE HYDROCHLORIDE 8 MILLIGRAM(S): 2 INJECTION INTRAMUSCULAR; INTRAVENOUS; SUBCUTANEOUS at 21:49

## 2018-09-01 RX ADMIN — OXYCODONE HYDROCHLORIDE 20 MILLIGRAM(S): 5 TABLET ORAL at 17:35

## 2018-09-01 RX ADMIN — TRAMADOL HYDROCHLORIDE 50 MILLIGRAM(S): 50 TABLET ORAL at 06:59

## 2018-09-01 RX ADMIN — PANTOPRAZOLE SODIUM 40 MILLIGRAM(S): 20 TABLET, DELAYED RELEASE ORAL at 06:29

## 2018-09-01 RX ADMIN — HYDROMORPHONE HYDROCHLORIDE 8 MILLIGRAM(S): 2 INJECTION INTRAMUSCULAR; INTRAVENOUS; SUBCUTANEOUS at 03:52

## 2018-09-01 RX ADMIN — CHLORHEXIDINE GLUCONATE 1 APPLICATION(S): 213 SOLUTION TOPICAL at 09:22

## 2018-09-01 RX ADMIN — Medication 1 PATCH: at 11:41

## 2018-09-01 RX ADMIN — Medication 650 MILLIGRAM(S): at 17:30

## 2018-09-01 RX ADMIN — HYDROMORPHONE HYDROCHLORIDE 8 MILLIGRAM(S): 2 INJECTION INTRAMUSCULAR; INTRAVENOUS; SUBCUTANEOUS at 13:42

## 2018-09-01 RX ADMIN — TRAMADOL HYDROCHLORIDE 50 MILLIGRAM(S): 50 TABLET ORAL at 12:00

## 2018-09-01 RX ADMIN — HYDROMORPHONE HYDROCHLORIDE 8 MILLIGRAM(S): 2 INJECTION INTRAMUSCULAR; INTRAVENOUS; SUBCUTANEOUS at 03:22

## 2018-09-01 RX ADMIN — HYDROMORPHONE HYDROCHLORIDE 8 MILLIGRAM(S): 2 INJECTION INTRAMUSCULAR; INTRAVENOUS; SUBCUTANEOUS at 14:00

## 2018-09-01 RX ADMIN — HYDROMORPHONE HYDROCHLORIDE 8 MILLIGRAM(S): 2 INJECTION INTRAMUSCULAR; INTRAVENOUS; SUBCUTANEOUS at 10:00

## 2018-09-01 RX ADMIN — QUETIAPINE FUMARATE 50 MILLIGRAM(S): 200 TABLET, FILM COATED ORAL at 00:18

## 2018-09-01 RX ADMIN — QUETIAPINE FUMARATE 50 MILLIGRAM(S): 200 TABLET, FILM COATED ORAL at 10:25

## 2018-09-01 RX ADMIN — TRAMADOL HYDROCHLORIDE 50 MILLIGRAM(S): 50 TABLET ORAL at 06:29

## 2018-09-01 RX ADMIN — Medication 650 MILLIGRAM(S): at 03:23

## 2018-09-01 RX ADMIN — GABAPENTIN 300 MILLIGRAM(S): 400 CAPSULE ORAL at 06:29

## 2018-09-01 RX ADMIN — POLYETHYLENE GLYCOL 3350 17 GRAM(S): 17 POWDER, FOR SOLUTION ORAL at 14:46

## 2018-09-01 RX ADMIN — HYDROMORPHONE HYDROCHLORIDE 8 MILLIGRAM(S): 2 INJECTION INTRAMUSCULAR; INTRAVENOUS; SUBCUTANEOUS at 09:15

## 2018-09-01 RX ADMIN — Medication 3 MILLILITER(S): at 18:00

## 2018-09-01 RX ADMIN — Medication 1 MILLIGRAM(S): at 11:33

## 2018-09-01 NOTE — PROGRESS NOTE ADULT - ASSESSMENT
A/P:  72y Female sp R hip periprosthetic I+D  Pain control  DVT ppx, L A81  will need PICC placed  ID recs appreciated   PT/WBAT/OOB  FU labs  Medical management appreciated  Incentive spirometry  Dispo planning    Ortho [id number]

## 2018-09-01 NOTE — PROGRESS NOTE ADULT - ASSESSMENT
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 THR, kika-prosthetic R femur fx (s/p Total Hip revision with ORIF, 6/30/18), recent re-admission (07/16 - 07/24) for worsening R hip pain and decreased ability to ambulate now s/p R femur vancouver B1 periprosthetic fracture ORIF (07/19) who presented to the ED on 8/20 with R hip and thigh pain, swelling, stiffness, and redness in the setting of a mechanical fall on the stairs  in the hospital spiked to 102.9, blood cx with MRSA  LE CT with lucency at the acetabular screw and prox fem, fluid collection in lateral soft tissue of thigh, an other fluid collection next to vastus intermedius  MRI also showed Complex fluid collection along the anterolateral femur adjacent to the hardware and a subcutaneous collection along the proximal lateral right thigh. Patient s/p I&D of right thigh cultures sent await results.   For now continue Vancomycin     c/o constipation and using fingers to remove stool from rectum,.

## 2018-09-01 NOTE — PROGRESS NOTE ADULT - SUBJECTIVE AND OBJECTIVE BOX
73 yo F, w/ PMH of emphysema, CAD, HTN, revision USAMA with ORIF for periprosthetic R femur fracture on 6/30/18 by Dr. Be, discharged on 7/3/18 to [facility] rehab, readmitted from 07/16/07/24/18 w/ worsening R hip pain and decreased ability to ambulate, now s/p right femur v[place] B1 periprosthetic fracture ORIF by Dr. Cornell on 07/19/18, BIBEMS s/p mechanical fall on stairs during the night c/o R hip and R leg pain, redness, swelling, stiffness. Patient denies other complaints. Denies headache, neck stiffness, fever/chills, vision change, chest pain, shortness of breath, difficulty breathing, palpitations, weakness, dizziness, nausea, vomiting, diarrhea, syncope.   From prior, note, patient had initial right hip fracture surgery at [facility] with a hip pin placement in 2015.  She developed avascular necrosis, necessitating a right total hip replacement at [facility] in 2017.  She was well for one year and had an accidental fall taking out her garbage June, 2018. She was then diagnosed with a periprosthetic fracture. Repair consisted of lengthening the right total hip replacement and then stabilization with the distal femur.  At rehabilitation, she had a nontraumatic separation of this distal stabilization which required a periprosthetic revision. Patients/p revision of right hip surgery as of 07/19/18. Patient present with fever and unclrear etiology.  She presented to the ED on 8/20 with R hip and thigh pain, swelling, stiffness, and redness in the setting of a mechanical fall on the stairs.  Then in the hospital spiked to 102.9, blood cx with MRSA  LE CT with lucency at the acetabular screw and prox femur, fluid collection in lateral soft tissue of thigh, an other fluid collection next to vastus intermedius.  Will need biopsy or drainage of the fluid collection. Cultures now show MRSA in Blood  and Urine. Needed procedure to deterine if there is  MRSA IN THE FLUID COLLECTION IN THE LEG. Patient seen today s/p washout of the right leg. Patient found to have anemia probably due to acute blood loss post op. Complais of inability to evacuate stool and needs to pull the feces Out by hand.  Will add Miralax and increase po fluids and  give probioitc      MEDICATIONS  (STANDING):  ALBUTerol/ipratropium for Nebulization 3 milliLiter(s) Nebulizer every 6 hours  aspirin enteric coated 81 milliGRAM(s) Oral daily  buDESOnide   0.5 milliGRAM(s) Respule 0.5 milliGRAM(s) Inhalation two times a day  chlorhexidine 4% Liquid 1 Application(s) Topical <User Schedule>  enoxaparin Injectable 40 milliGRAM(s) SubCutaneous daily  folic acid 1 milliGRAM(s) Oral daily  gabapentin 300 milliGRAM(s) Oral three times a day  lactated ringers. 1000 milliLiter(s) (75 mL/Hr) IV Continuous <Continuous>  nicotine - 21 mG/24Hr(s) Patch 1 patch Transdermal daily  oxyCODONE  ER Tablet 20 milliGRAM(s) Oral every 12 hours  pantoprazole    Tablet 40 milliGRAM(s) Oral before breakfast  polyethylene glycol 3350 17 Gram(s) Oral daily  QUEtiapine 150 milliGRAM(s) Oral at bedtime  senna 2 Tablet(s) Oral at bedtime  thiamine 100 milliGRAM(s) Oral daily  traMADol 50 milliGRAM(s) Oral every 6 hours  vancomycin  IVPB 1000 milliGRAM(s) IV Intermittent every 12 hours    MEDICATIONS  (PRN):  acetaminophen   Tablet 650 milliGRAM(s) Oral every 6 hours PRN For Temp greater than 38.5 C (101.3 F)  acetaminophen   Tablet. 650 milliGRAM(s) Oral every 6 hours PRN Mild Pain (1 - 3)  HYDROmorphone   Tablet 4 milliGRAM(s) Oral every 3 hours PRN Moderate Pain (4 - 6)  HYDROmorphone   Tablet 8 milliGRAM(s) Oral every 3 hours PRN Severe Pain (7 - 10)  QUEtiapine 50 milliGRAM(s) Oral every 6 hours PRN anxiety/insomnia             Vital Signs Last 24 Hrs  T(C): 36.4 (01 Sep 2018 06:24), Max: 37.1 (31 Aug 2018 21:26)  T(F): 97.5 (01 Sep 2018 06:24), Max: 98.7 (31 Aug 2018 21:26)  HR: 66 (01 Sep 2018 06:24) (66 - 78)  BP: 100/64 (01 Sep 2018 06:24) (100/64 - 132/87)  BP(mean): --  RR: 18 (01 Sep 2018 06:24) (18 - 18)  SpO2: 95% (01 Sep 2018 06:24) (95% - 95%)      PHYSICAL EXAM:  GENERAL: NAD, well nourished and conversant  HEAD:  Atraumatic  EYES: EOM, PERRLA, conjunctiva pink and sclera white  ENT: No tonsillar erythema, exudates, or enlargement, moist mucous membranes, good dentition, no lesions  NECK: Supple, No JVD, normal thyroid, carotids with normal upstrokes and no bruits  CHEST/LUNG: soft rales at the left base  HEART: Regular rate and rhythm, No murmurs, rubs, or gallops  ABDOMEN: Soft, nondistended, no masses, guarding, tenderness or rebound, bowel sounds present  EXTREMITIES:  2+ Peripheral Pulses, No clubbing, cyanosis, or edema. (+) right periprosthetic fracture site edema  LYMPH: No lymphadenopathy noted  SKIN: No rashes or lesions  NERVOUS SYSTEM:  Alert & Oriented X3, normal cognitive function. Motor Strength 5/5 right upper and right lower.  5/5 left upper and left lower extremities, DTRs 2+ intact and symmetric    LABS:        CBC Full  -  ( 01 Sep 2018 10:26 )  WBC Count : 6.45 K/uL  Hemoglobin : 8.1 g/dL  Hematocrit : 26.0 %  Platelet Count - Automated : 317 K/uL  Mean Cell Volume : 86.4 fl  Mean Cell Hemoglobin : 26.9 pg  Mean Cell Hemoglobin Concentration : 31.2 gm/dL  Auto Neutrophil # : x  Auto Lymphocyte # : x  Auto Monocyte # : x  Auto Eosinophil # : x  Auto Basophil # : x  Auto Neutrophil % : x  Auto Lymphocyte % : x  Auto Monocyte % : x  Auto Eosinophil % : x  Auto Basophil % : x    09-01    138  |  101  |  20  ----------------------------<  89  4.4   |  26  |  0.85    Ca    9.3      01 Sep 2018 08:21                    CAPILLARY BLOOD GLUCOSE          RADIOLOGY & ADDITIONAL TESTS:

## 2018-09-01 NOTE — CHART NOTE - NSCHARTNOTEFT_GEN_A_CORE
Chief Complaint: Headache x 2 days     NP Note (episodic)     HPI:  Called in by RN because pt c/o HA. Pt seen and examined at bedside. Pt states she was trying to pay for her cable bill, bent over and on her way up hit the back of her head on the table. This happened 2 days ago. Pt describes HA in the posterior area of her head, 9/10 ache not associated with numbness, tingling visual changes, nausea vomiting.  Pt denies dizziness and LOC during the event.       REVIEW OF SYSTEMS:  CONSTITUTIONAL: No weakness, fevers or chills  EYES/ENT: + HA, No visual changes;  No vertigo or throat pain   NECK: + neck soreness   RESPIRATORY: No cough, wheezing, hemoptysis; No shortness of breath  CARDIOVASCULAR: No chest pain or palpitations  GASTROINTESTINAL: No abdominal or epigastric pain. No nausea, vomiting, or hematemesis; No diarrhea or constipation. No melena or hematochezia.  MUSCULOSKELETAL: RLE incision dressing intact, pain from site 2/2 to her surgery   GENITOURINARY: No dysuria, frequency or hematuria  NEUROLOGICAL: No numbness or weakness  SKIN: No itching, burning, rashes, or lesions   All other review of systems is negative unless indicated above.    PMH/PSH:  PAST MEDICAL & SURGICAL HISTORY:  Pain of right hip joint  Migraine  Alcohol abuse  Seizure  Stented coronary artery  Coronary artery disease  Anxiety  HTN (hypertension)  Emphysema, unspecified  Anxiety  Migraine  CAD (coronary artery disease)  Hyperlipidemia  Hypertension  Status post total hip replacement, right: 5/21/18  S/P bladder repair        Allergies    No Known Allergies    Intolerances          Physical Exam:    Vital Signs Last 24 Hrs  T(C): 37.1 (31 Aug 2018 21:26), Max: 37.1 (31 Aug 2018 21:26)  T(F): 98.7 (31 Aug 2018 21:26), Max: 98.7 (31 Aug 2018 21:26)  HR: 78 (31 Aug 2018 21:26) (73 - 78)  BP: 132/87 (31 Aug 2018 21:26) (109/68 - 135/64)  BP(mean): --  RR: 18 (31 Aug 2018 21:26) (18 - 18)  SpO2: 95% (31 Aug 2018 21:26) (94% - 95%)    General:  NAD, AOx3, nontoxic appearing, slightly sleepy   Head:  NC/AT, no hematoma/ecchymosis, + TTP posterior, + paraspinal cervical TTP, no midline TTP,  no scleral injection, no JVD, PERRL, EOMI   CV: RRR, S1S2   Respiratory: CTA B/L, nonlabored  Abdominal: Soft, NT, ND no palpable mass, no guarding or rebound tenderness  MSK: RLE serosanguinous drainage on dressing noted , able to raise BLLE against gravity   Neuro: CN II-XII intact, no pronator drift, neg romberg, no facial droop       Labs:                          8.2    8.5   )-----------( 324      ( 31 Aug 2018 21:35 )             25.3     08-31    135  |  97  |  20  ----------------------------<  105<H>  4.4   |  25  |  0.85    Ca    9.8      31 Aug 2018 14:57          Assessment & Plan:    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 THR, kika-prosthetic R femur fx (s/p Total Hip revision with ORIF, 6/30/18), recent re-admission (07/16 - 07/24) for worsening R hip pain and decreased ability to ambulate now s/p R femur vancouver B1 periprosthetic fracture ORIF (07/19) who presented to the ED on 8/20 with R hip and thigh pain, swelling, stiffness, and redness in the setting of a mechanical fall on the stairs in the hospital spiked to 102.9, blood cx with MRSA, MRI also showed Complex fluid collection along the anterolateral femur adjacent to the hardware and a subcutaneous collection along the proximal lateral right thigh. Patient s/p I&D of right thigh now p/w posterior HA s/p injury as she was getting up 2 days ago with no LOC or dizziness/CP. Of note, pt observed to be sleeping soundly on initial exam and when woken, pt proceeded to c/o 9/10 posterior HA. On chart review, pt was not on lovenox during the incident.     - tylenol for HA   - Q6 hr neuro checks   - if pt becomes lethargic, altered, c/o WHOL, develop neurological deficits, would obtain stat CTH, would opt to closely monitor at this time   - neck pain likely MSK etiology    - continue to monitor closely     Follow up with Primary Team in AM.    [place] [id number]

## 2018-09-01 NOTE — PROGRESS NOTE ADULT - SUBJECTIVE AND OBJECTIVE BOX
Ortho Progress Note    S: Patient seen and examined. No acute events overnight. Pain well controlled with current regimen. Denies lightheadedness/dizziness, CP/SOB. Tolerating diet. dressing was changed twice overnight      O:  Physical Exam:  Gen: Laying in bed, NAD, alert and oriented.   Resp: Unlabored breathing  Ext: EHL/FHL/TA/Sol intact          + SILT DP/SP/MCDANIEL/Sa          +DP, extremity WWP  Incision: c/d/i, no erythema or edema when we saw her this am      Vital Signs Last 24 Hrs  T(C): 36.4 (01 Sep 2018 06:24), Max: 37.1 (31 Aug 2018 21:26)  T(F): 97.5 (01 Sep 2018 06:24), Max: 98.7 (31 Aug 2018 21:26)  HR: 66 (01 Sep 2018 06:24) (66 - 78)  BP: 100/64 (01 Sep 2018 06:24) (100/64 - 132/87)  BP(mean): --  RR: 18 (01 Sep 2018 06:24) (18 - 18)  SpO2: 95% (01 Sep 2018 06:24) (95% - 95%)                          8.2    8.5   )-----------( 324      ( 31 Aug 2018 21:35 )             25.3                         8.3    7.3   )-----------( 317      ( 31 Aug 2018 14:46 )             25.8       08-31    135  |  97  |  20  ----------------------------<  105<H>  4.4   |  25  |  0.85

## 2018-09-02 LAB
ANION GAP SERPL CALC-SCNC: 11 MMOL/L — SIGNIFICANT CHANGE UP (ref 5–17)
BUN SERPL-MCNC: 20 MG/DL — SIGNIFICANT CHANGE UP (ref 7–23)
CALCIUM SERPL-MCNC: 8.7 MG/DL — SIGNIFICANT CHANGE UP (ref 8.4–10.5)
CHLORIDE SERPL-SCNC: 101 MMOL/L — SIGNIFICANT CHANGE UP (ref 96–108)
CO2 SERPL-SCNC: 25 MMOL/L — SIGNIFICANT CHANGE UP (ref 22–31)
CREAT SERPL-MCNC: 0.92 MG/DL — SIGNIFICANT CHANGE UP (ref 0.5–1.3)
GLUCOSE SERPL-MCNC: 92 MG/DL — SIGNIFICANT CHANGE UP (ref 70–99)
HCT VFR BLD CALC: 25.2 % — LOW (ref 34.5–45)
HGB BLD-MCNC: 7.8 G/DL — LOW (ref 11.5–15.5)
MCHC RBC-ENTMCNC: 27.1 PG — SIGNIFICANT CHANGE UP (ref 27–34)
MCHC RBC-ENTMCNC: 31 GM/DL — LOW (ref 32–36)
MCV RBC AUTO: 87.5 FL — SIGNIFICANT CHANGE UP (ref 80–100)
PLATELET # BLD AUTO: 303 K/UL — SIGNIFICANT CHANGE UP (ref 150–400)
POTASSIUM SERPL-MCNC: 4.3 MMOL/L — SIGNIFICANT CHANGE UP (ref 3.5–5.3)
POTASSIUM SERPL-SCNC: 4.3 MMOL/L — SIGNIFICANT CHANGE UP (ref 3.5–5.3)
RBC # BLD: 2.88 M/UL — LOW (ref 3.8–5.2)
RBC # FLD: 20.5 % — HIGH (ref 10.3–14.5)
SODIUM SERPL-SCNC: 137 MMOL/L — SIGNIFICANT CHANGE UP (ref 135–145)
VANCOMYCIN FLD-MCNC: 22.9 UG/ML — SIGNIFICANT CHANGE UP
VANCOMYCIN TROUGH SERPL-MCNC: 21.9 UG/ML — HIGH (ref 10–20)
WBC # BLD: 7.22 K/UL — SIGNIFICANT CHANGE UP (ref 3.8–10.5)
WBC # FLD AUTO: 7.22 K/UL — SIGNIFICANT CHANGE UP (ref 3.8–10.5)

## 2018-09-02 RX ADMIN — TRAMADOL HYDROCHLORIDE 50 MILLIGRAM(S): 50 TABLET ORAL at 06:11

## 2018-09-02 RX ADMIN — HYDROMORPHONE HYDROCHLORIDE 8 MILLIGRAM(S): 2 INJECTION INTRAMUSCULAR; INTRAVENOUS; SUBCUTANEOUS at 00:28

## 2018-09-02 RX ADMIN — GABAPENTIN 300 MILLIGRAM(S): 400 CAPSULE ORAL at 21:01

## 2018-09-02 RX ADMIN — Medication 81 MILLIGRAM(S): at 12:19

## 2018-09-02 RX ADMIN — GABAPENTIN 300 MILLIGRAM(S): 400 CAPSULE ORAL at 13:02

## 2018-09-02 RX ADMIN — Medication 650 MILLIGRAM(S): at 17:00

## 2018-09-02 RX ADMIN — OXYCODONE HYDROCHLORIDE 20 MILLIGRAM(S): 5 TABLET ORAL at 18:37

## 2018-09-02 RX ADMIN — OXYCODONE HYDROCHLORIDE 20 MILLIGRAM(S): 5 TABLET ORAL at 18:07

## 2018-09-02 RX ADMIN — Medication 1 PATCH: at 12:27

## 2018-09-02 RX ADMIN — TRAMADOL HYDROCHLORIDE 50 MILLIGRAM(S): 50 TABLET ORAL at 23:26

## 2018-09-02 RX ADMIN — TRAMADOL HYDROCHLORIDE 50 MILLIGRAM(S): 50 TABLET ORAL at 12:22

## 2018-09-02 RX ADMIN — TRAMADOL HYDROCHLORIDE 50 MILLIGRAM(S): 50 TABLET ORAL at 23:56

## 2018-09-02 RX ADMIN — HYDROMORPHONE HYDROCHLORIDE 8 MILLIGRAM(S): 2 INJECTION INTRAMUSCULAR; INTRAVENOUS; SUBCUTANEOUS at 10:42

## 2018-09-02 RX ADMIN — QUETIAPINE FUMARATE 50 MILLIGRAM(S): 200 TABLET, FILM COATED ORAL at 13:02

## 2018-09-02 RX ADMIN — HYDROMORPHONE HYDROCHLORIDE 8 MILLIGRAM(S): 2 INJECTION INTRAMUSCULAR; INTRAVENOUS; SUBCUTANEOUS at 23:26

## 2018-09-02 RX ADMIN — Medication 250 MILLIGRAM(S): at 10:41

## 2018-09-02 RX ADMIN — TRAMADOL HYDROCHLORIDE 50 MILLIGRAM(S): 50 TABLET ORAL at 05:41

## 2018-09-02 RX ADMIN — ENOXAPARIN SODIUM 40 MILLIGRAM(S): 100 INJECTION SUBCUTANEOUS at 12:17

## 2018-09-02 RX ADMIN — Medication 3 MILLILITER(S): at 05:43

## 2018-09-02 RX ADMIN — Medication 100 MILLIGRAM(S): at 12:20

## 2018-09-02 RX ADMIN — Medication 1 TABLET(S): at 12:19

## 2018-09-02 RX ADMIN — HYDROMORPHONE HYDROCHLORIDE 8 MILLIGRAM(S): 2 INJECTION INTRAMUSCULAR; INTRAVENOUS; SUBCUTANEOUS at 23:56

## 2018-09-02 RX ADMIN — GABAPENTIN 300 MILLIGRAM(S): 400 CAPSULE ORAL at 05:43

## 2018-09-02 RX ADMIN — OXYCODONE HYDROCHLORIDE 20 MILLIGRAM(S): 5 TABLET ORAL at 06:12

## 2018-09-02 RX ADMIN — OXYCODONE HYDROCHLORIDE 20 MILLIGRAM(S): 5 TABLET ORAL at 05:42

## 2018-09-02 RX ADMIN — Medication 0.5 MILLIGRAM(S): at 05:43

## 2018-09-02 RX ADMIN — HYDROMORPHONE HYDROCHLORIDE 8 MILLIGRAM(S): 2 INJECTION INTRAMUSCULAR; INTRAVENOUS; SUBCUTANEOUS at 16:00

## 2018-09-02 RX ADMIN — HYDROMORPHONE HYDROCHLORIDE 8 MILLIGRAM(S): 2 INJECTION INTRAMUSCULAR; INTRAVENOUS; SUBCUTANEOUS at 15:15

## 2018-09-02 RX ADMIN — TRAMADOL HYDROCHLORIDE 50 MILLIGRAM(S): 50 TABLET ORAL at 18:38

## 2018-09-02 RX ADMIN — Medication 3 MILLILITER(S): at 18:11

## 2018-09-02 RX ADMIN — HYDROMORPHONE HYDROCHLORIDE 8 MILLIGRAM(S): 2 INJECTION INTRAMUSCULAR; INTRAVENOUS; SUBCUTANEOUS at 11:30

## 2018-09-02 RX ADMIN — HYDROMORPHONE HYDROCHLORIDE 8 MILLIGRAM(S): 2 INJECTION INTRAMUSCULAR; INTRAVENOUS; SUBCUTANEOUS at 00:58

## 2018-09-02 RX ADMIN — QUETIAPINE FUMARATE 50 MILLIGRAM(S): 200 TABLET, FILM COATED ORAL at 06:59

## 2018-09-02 RX ADMIN — TRAMADOL HYDROCHLORIDE 50 MILLIGRAM(S): 50 TABLET ORAL at 13:02

## 2018-09-02 RX ADMIN — HYDROMORPHONE HYDROCHLORIDE 8 MILLIGRAM(S): 2 INJECTION INTRAMUSCULAR; INTRAVENOUS; SUBCUTANEOUS at 19:59

## 2018-09-02 RX ADMIN — Medication 0.5 MILLIGRAM(S): at 18:08

## 2018-09-02 RX ADMIN — Medication 650 MILLIGRAM(S): at 16:38

## 2018-09-02 RX ADMIN — HYDROMORPHONE HYDROCHLORIDE 8 MILLIGRAM(S): 2 INJECTION INTRAMUSCULAR; INTRAVENOUS; SUBCUTANEOUS at 20:29

## 2018-09-02 RX ADMIN — Medication 3 MILLILITER(S): at 12:17

## 2018-09-02 RX ADMIN — PANTOPRAZOLE SODIUM 40 MILLIGRAM(S): 20 TABLET, DELAYED RELEASE ORAL at 05:43

## 2018-09-02 RX ADMIN — QUETIAPINE FUMARATE 150 MILLIGRAM(S): 200 TABLET, FILM COATED ORAL at 21:01

## 2018-09-02 RX ADMIN — Medication 1 MILLIGRAM(S): at 12:19

## 2018-09-02 RX ADMIN — Medication 1 TABLET(S): at 12:20

## 2018-09-02 RX ADMIN — TRAMADOL HYDROCHLORIDE 50 MILLIGRAM(S): 50 TABLET ORAL at 18:06

## 2018-09-02 RX ADMIN — Medication 1 PATCH: at 12:18

## 2018-09-02 NOTE — PROGRESS NOTE ADULT - ASSESSMENT
A/P:  72y Female sp R hip periprosthetic I+D  Pain control  DVT ppx, L A81  will need PICC placed  ID recs appreciated [id number] followed by chronic supression  PT/WBAT/OOB  FU labs  Medical management appreciated  Incentive spirometry  Dispo planning    Ortho [id number]

## 2018-09-02 NOTE — PROGRESS NOTE ADULT - SUBJECTIVE AND OBJECTIVE BOX
Ortho Progress Note    S: Patient seen and examined. No acute events overnight. Pain well controlled with current regimen. Denies lightheadedness/dizziness, CP/SOB. Tolerating diet.      O:  Physical Exam:  Gen: Laying in bed, NAD, alert and oriented.   Resp: Unlabored breathing  Ext: EHL/FHL/TA/Sol intact          + SILT DP/SP/MCDANIEL/Sa          +DP, extremity WWP  Incision: c/d/i, no erythema or edema    ICU Vital Signs Last 24 Hrs  T(C): 36.8 (01 Sep 2018 23:43), Max: 36.8 (01 Sep 2018 23:43)  T(F): 98.2 (01 Sep 2018 23:43), Max: 98.2 (01 Sep 2018 23:43)  HR: 77 (01 Sep 2018 23:43) (66 - 77)  BP: 138/79 (01 Sep 2018 23:43) (100/64 - 146/77)  BP(mean): --  ABP: --  ABP(mean): --  RR: 18 (01 Sep 2018 23:43) (18 - 18)  SpO2: 95% (01 Sep 2018 23:43) (95% - 96%)                          8.1    6.45  )-----------( 317      ( 01 Sep 2018 10:26 )             26.0     09-01    138  |  101  |  20  ----------------------------<  89  4.4   |  26  |  0.85    Ca    9.3      01 Sep 2018 08:21

## 2018-09-02 NOTE — PROGRESS NOTE ADULT - SUBJECTIVE AND OBJECTIVE BOX
73 yo F, w/ PMH of emphysema, CAD, HTN, revision USAMA with ORIF for periprosthetic R femur fracture on 6/30/18 by Dr. Be, discharged on 7/3/18 to [facility] rehab, readmitted from 07/16/07/24/18 w/ worsening R hip pain and decreased ability to ambulate, now s/p right femur vancouver B1 periprosthetic fracture ORIF by Dr. Cornell on 07/19/18, BIBEMS s/p mechanical fall on stairs during the night c/o R hip and R leg pain, redness, swelling, stiffness. Patient denies other complaints. Denies headache, neck stiffness, fever/chills, vision change, chest pain, shortness of breath, difficulty breathing, palpitations, weakness, dizziness, nausea, vomiting, diarrhea, syncope.   From prior, note, patient had initial right hip fracture surgery at [facility] with a hip pin placement in 2015.  She developed avascular necrosis, necessitating a right total hip replacement at [facility] in 2017.  She was well for one year and had an accidental fall taking out her garbage June, 2018. She was then diagnosed with a periprosthetic fracture. Repair consisted of lengthening the right total hip replacement and then stabilization with the distal femur.  At rehabilitation, she had a nontraumatic separation of this distal stabilization which required a periprosthetic revision. Patients/p revision of right hip surgery as of 07/19/18. Patient present with fever and unclrear etiology.  She presented to the ED on 8/20 with R hip and thigh pain, swelling, stiffness, and redness in the setting of a mechanical fall on the stairs.  Then in the hospital spiked to 102.9, blood cx with MRSA  LE CT with lucency at the acetabular screw and prox femur, fluid collection in lateral soft tissue of thigh, an other fluid collection next to vastus intermedius.  Will need biopsy or drainage of the fluid collection. Cultures now show MRSA in Blood  and Urine. Needed procedure to deterine if there is  MRSA IN THE FLUID COLLECTION IN THE LEG. Patient seen today s/p washout of the right leg. Patient found to have anemia probably due to acute blood loss post op. Complais of inability to evacuate stool and needs to pull the feces Out by hand.  Will add Miralax and increase po fluids and  give probioitc      MEDICATIONS  (STANDING):  ALBUTerol/ipratropium for Nebulization 3 milliLiter(s) Nebulizer every 6 hours  aspirin enteric coated 81 milliGRAM(s) Oral daily  buDESOnide   0.5 milliGRAM(s) Respule 0.5 milliGRAM(s) Inhalation two times a day  chlorhexidine 4% Liquid 1 Application(s) Topical <User Schedule>  enoxaparin Injectable 40 milliGRAM(s) SubCutaneous daily  folic acid 1 milliGRAM(s) Oral daily  gabapentin 300 milliGRAM(s) Oral three times a day  lactated ringers. 1000 milliLiter(s) (75 mL/Hr) IV Continuous <Continuous>  nicotine - 21 mG/24Hr(s) Patch 1 patch Transdermal daily  oxyCODONE  ER Tablet 20 milliGRAM(s) Oral every 12 hours  pantoprazole    Tablet 40 milliGRAM(s) Oral before breakfast  polyethylene glycol 3350 17 Gram(s) Oral daily  QUEtiapine 150 milliGRAM(s) Oral at bedtime  senna 2 Tablet(s) Oral at bedtime  thiamine 100 milliGRAM(s) Oral daily  traMADol 50 milliGRAM(s) Oral every 6 hours  vancomycin  IVPB 1000 milliGRAM(s) IV Intermittent every 12 hours    MEDICATIONS  (PRN):  acetaminophen   Tablet 650 milliGRAM(s) Oral every 6 hours PRN For Temp greater than 38.5 C (101.3 F)  acetaminophen   Tablet. 650 milliGRAM(s) Oral every 6 hours PRN Mild Pain (1 - 3)  HYDROmorphone   Tablet 4 milliGRAM(s) Oral every 3 hours PRN Moderate Pain (4 - 6)  HYDROmorphone   Tablet 8 milliGRAM(s) Oral every 3 hours PRN Severe Pain (7 - 10)  QUEtiapine 50 milliGRAM(s) Oral every 6 hours PRN anxiety/insomnia             Vital Signs Last 24 Hrs  T(C): 36.4 (01 Sep 2018 06:24), Max: 37.1 (31 Aug 2018 21:26)  T(F): 97.5 (01 Sep 2018 06:24), Max: 98.7 (31 Aug 2018 21:26)  HR: 66 (01 Sep 2018 06:24) (66 - 78)  BP: 100/64 (01 Sep 2018 06:24) (100/64 - 132/87)  BP(mean): --  RR: 18 (01 Sep 2018 06:24) (18 - 18)  SpO2: 95% (01 Sep 2018 06:24) (95% - 95%)      PHYSICAL EXAM:  GENERAL: NAD, well nourished and conversant  HEAD:  Atraumatic  EYES: EOM, PERRLA, conjunctiva pink and sclera white  ENT: No tonsillar erythema, exudates, or enlargement, moist mucous membranes, good dentition, no lesions  NECK: Supple, No JVD, normal thyroid, carotids with normal upstrokes and no bruits  CHEST/LUNG: soft rales at the left base  HEART: Regular rate and rhythm, No murmurs, rubs, or gallops  ABDOMEN: Soft, nondistended, no masses, guarding, tenderness or rebound, bowel sounds present  EXTREMITIES:  2+ Peripheral Pulses, No clubbing, cyanosis, or edema. (+) right periprosthetic fracture site edema  LYMPH: No lymphadenopathy noted  SKIN: No rashes or lesions  NERVOUS SYSTEM:  Alert & Oriented X3, normal cognitive function. Motor Strength 5/5 right upper and right lower.  5/5 left upper and left lower extremities, DTRs 2+ intact and symmetric    LABS:        CBC Full  -  ( 01 Sep 2018 10:26 )  WBC Count : 6.45 K/uL  Hemoglobin : 8.1 g/dL  Hematocrit : 26.0 %  Platelet Count - Automated : 317 K/uL  Mean Cell Volume : 86.4 fl  Mean Cell Hemoglobin : 26.9 pg  Mean Cell Hemoglobin Concentration : 31.2 gm/dL  Auto Neutrophil # : x  Auto Lymphocyte # : x  Auto Monocyte # : x  Auto Eosinophil # : x  Auto Basophil # : x  Auto Neutrophil % : x  Auto Lymphocyte % : x  Auto Monocyte % : x  Auto Eosinophil % : x  Auto Basophil % : x    09-01    138  |  101  |  20  ----------------------------<  89  4.4   |  26  |  0.85    Ca    9.3      01 Sep 2018 08:21                    CAPILLARY BLOOD GLUCOSE          RADIOLOGY & ADDITIONAL TESTS:

## 2018-09-02 NOTE — PROVIDER CONTACT NOTE (OTHER) - REASON
Pre 4th Vanco dose 21.9
Pt C/O Chest tightness
Pt states she hit her head about 2-3 days ago.
Vanco trough level
pt refusing 2nd set of bc to be drawn for fever this morning
Stat lab results

## 2018-09-02 NOTE — DISCHARGE NOTE ADULT - PATIENT PORTAL LINK FT
patient/family
You can access the [facility] Patient Portal, offered by [facility], by registering with the following website: [email or web address]

## 2018-09-02 NOTE — PROVIDER CONTACT NOTE (OTHER) - BACKGROUND
73 yo female with right hip pain, with right hip bone infection.  Pt prescribed IV antibiotics.
71 y/o female admitted for right hip pain   Righ hip hardware positive MRSA cx  on vancomycin 1000mg q12hrs for bactermia and rifampinin IVPB for MRSA hardware infection.
73 yo female with right hip bone infection, on IV Vancomycin.
73 yo female with right hip infection, on IV vancomycin.
temp of 102.9 oral at 0629, this morning

## 2018-09-02 NOTE — PROGRESS NOTE ADULT - ASSESSMENT
Called juan luis and ROBYN for them to call back to get labs   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 THR, kika-prosthetic R femur fx (s/p Total Hip revision with ORIF, 6/30/18), recent re-admission (07/16 - 07/24) for worsening R hip pain and decreased ability to ambulate now s/p R femur vancouver B1 periprosthetic fracture ORIF (07/19) who presented to the ED on 8/20 with R hip and thigh pain, swelling, stiffness, and redness in the setting of a mechanical fall on the stairs  in the hospital spiked to 102.9, blood cx with MRSA  LE CT with lucency at the acetabular screw and prox fem, fluid collection in lateral soft tissue of thigh, an other fluid collection next to vastus intermedius  MRI also showed Complex fluid collection along the anterolateral femur adjacent to the hardware and a subcutaneous collection along the proximal lateral right thigh. Patient s/p I&D of right thigh cultures sent await results.   For now continue Vancomycin     c/o constipation and using fingers to remove stool from rectum,.

## 2018-09-02 NOTE — PROVIDER CONTACT NOTE (OTHER) - ASSESSMENT
Pt alert and in no acute distress.
Pt's stat CBC shows Hgb = 8.1.  PA notified and stated satisfaction with the results.
As per NP hold evening dose of Vancomycin 8/31/18.
Upon observing pt's posterior head, there is no tenderness, no lumps, no protrusions, no opening, no scabs no lacerations.
VSS, pt a&ox4
stable, afebrile

## 2018-09-03 LAB
CULTURE RESULTS: SIGNIFICANT CHANGE UP
HCT VFR BLD CALC: 26.5 % — LOW (ref 34.5–45)
HGB BLD-MCNC: 8.3 G/DL — LOW (ref 11.5–15.5)
MCHC RBC-ENTMCNC: 27.2 PG — SIGNIFICANT CHANGE UP (ref 27–34)
MCHC RBC-ENTMCNC: 31.3 GM/DL — LOW (ref 32–36)
MCV RBC AUTO: 86.9 FL — SIGNIFICANT CHANGE UP (ref 80–100)
ORGANISM # SPEC MICROSCOPIC CNT: SIGNIFICANT CHANGE UP
ORGANISM # SPEC MICROSCOPIC CNT: SIGNIFICANT CHANGE UP
PLATELET # BLD AUTO: 317 K/UL — SIGNIFICANT CHANGE UP (ref 150–400)
RBC # BLD: 3.05 M/UL — LOW (ref 3.8–5.2)
RBC # FLD: 20.5 % — HIGH (ref 10.3–14.5)
SPECIMEN SOURCE: SIGNIFICANT CHANGE UP
VANCOMYCIN TROUGH SERPL-MCNC: 17.3 UG/ML — SIGNIFICANT CHANGE UP (ref 10–20)
WBC # BLD: 5.5 K/UL — SIGNIFICANT CHANGE UP (ref 3.8–10.5)
WBC # FLD AUTO: 5.5 K/UL — SIGNIFICANT CHANGE UP (ref 3.8–10.5)

## 2018-09-03 PROCEDURE — 99233 SBSQ HOSP IP/OBS HIGH 50: CPT | Mod: GC

## 2018-09-03 RX ORDER — TRAMADOL HYDROCHLORIDE 50 MG/1
50 TABLET ORAL EVERY 6 HOURS
Qty: 0 | Refills: 0 | Status: DISCONTINUED | OUTPATIENT
Start: 2018-09-03 | End: 2018-09-07

## 2018-09-03 RX ORDER — OXYCODONE HYDROCHLORIDE 5 MG/1
20 TABLET ORAL EVERY 12 HOURS
Qty: 0 | Refills: 0 | Status: DISCONTINUED | OUTPATIENT
Start: 2018-09-03 | End: 2018-09-07

## 2018-09-03 RX ORDER — VANCOMYCIN HCL 1 G
750 VIAL (EA) INTRAVENOUS EVERY 12 HOURS
Qty: 0 | Refills: 0 | Status: DISCONTINUED | OUTPATIENT
Start: 2018-09-03 | End: 2018-09-05

## 2018-09-03 RX ADMIN — TRAMADOL HYDROCHLORIDE 50 MILLIGRAM(S): 50 TABLET ORAL at 11:41

## 2018-09-03 RX ADMIN — HYDROMORPHONE HYDROCHLORIDE 8 MILLIGRAM(S): 2 INJECTION INTRAMUSCULAR; INTRAVENOUS; SUBCUTANEOUS at 13:53

## 2018-09-03 RX ADMIN — GABAPENTIN 300 MILLIGRAM(S): 400 CAPSULE ORAL at 21:02

## 2018-09-03 RX ADMIN — QUETIAPINE FUMARATE 150 MILLIGRAM(S): 200 TABLET, FILM COATED ORAL at 21:03

## 2018-09-03 RX ADMIN — HYDROMORPHONE HYDROCHLORIDE 8 MILLIGRAM(S): 2 INJECTION INTRAMUSCULAR; INTRAVENOUS; SUBCUTANEOUS at 03:59

## 2018-09-03 RX ADMIN — HYDROMORPHONE HYDROCHLORIDE 8 MILLIGRAM(S): 2 INJECTION INTRAMUSCULAR; INTRAVENOUS; SUBCUTANEOUS at 04:29

## 2018-09-03 RX ADMIN — Medication 1 PATCH: at 11:41

## 2018-09-03 RX ADMIN — Medication 0.5 MILLIGRAM(S): at 06:17

## 2018-09-03 RX ADMIN — Medication 100 MILLIGRAM(S): at 13:53

## 2018-09-03 RX ADMIN — TRAMADOL HYDROCHLORIDE 50 MILLIGRAM(S): 50 TABLET ORAL at 06:47

## 2018-09-03 RX ADMIN — Medication 3 MILLILITER(S): at 06:17

## 2018-09-03 RX ADMIN — TRAMADOL HYDROCHLORIDE 50 MILLIGRAM(S): 50 TABLET ORAL at 06:17

## 2018-09-03 RX ADMIN — TRAMADOL HYDROCHLORIDE 50 MILLIGRAM(S): 50 TABLET ORAL at 18:31

## 2018-09-03 RX ADMIN — Medication 250 MILLIGRAM(S): at 17:48

## 2018-09-03 RX ADMIN — Medication 1 TABLET(S): at 13:52

## 2018-09-03 RX ADMIN — HYDROMORPHONE HYDROCHLORIDE 8 MILLIGRAM(S): 2 INJECTION INTRAMUSCULAR; INTRAVENOUS; SUBCUTANEOUS at 19:10

## 2018-09-03 RX ADMIN — Medication 1 MILLIGRAM(S): at 11:41

## 2018-09-03 RX ADMIN — Medication 1 PATCH: at 11:39

## 2018-09-03 RX ADMIN — Medication 1 TABLET(S): at 11:41

## 2018-09-03 RX ADMIN — OXYCODONE HYDROCHLORIDE 20 MILLIGRAM(S): 5 TABLET ORAL at 18:30

## 2018-09-03 RX ADMIN — Medication 650 MILLIGRAM(S): at 11:40

## 2018-09-03 RX ADMIN — HYDROMORPHONE HYDROCHLORIDE 8 MILLIGRAM(S): 2 INJECTION INTRAMUSCULAR; INTRAVENOUS; SUBCUTANEOUS at 18:40

## 2018-09-03 RX ADMIN — HYDROMORPHONE HYDROCHLORIDE 8 MILLIGRAM(S): 2 INJECTION INTRAMUSCULAR; INTRAVENOUS; SUBCUTANEOUS at 15:01

## 2018-09-03 RX ADMIN — Medication 650 MILLIGRAM(S): at 23:50

## 2018-09-03 RX ADMIN — ENOXAPARIN SODIUM 40 MILLIGRAM(S): 100 INJECTION SUBCUTANEOUS at 11:41

## 2018-09-03 RX ADMIN — OXYCODONE HYDROCHLORIDE 20 MILLIGRAM(S): 5 TABLET ORAL at 06:47

## 2018-09-03 RX ADMIN — TRAMADOL HYDROCHLORIDE 50 MILLIGRAM(S): 50 TABLET ORAL at 23:50

## 2018-09-03 RX ADMIN — Medication 650 MILLIGRAM(S): at 23:26

## 2018-09-03 RX ADMIN — GABAPENTIN 300 MILLIGRAM(S): 400 CAPSULE ORAL at 06:17

## 2018-09-03 RX ADMIN — Medication 81 MILLIGRAM(S): at 11:41

## 2018-09-03 RX ADMIN — TRAMADOL HYDROCHLORIDE 50 MILLIGRAM(S): 50 TABLET ORAL at 17:47

## 2018-09-03 RX ADMIN — Medication 650 MILLIGRAM(S): at 12:30

## 2018-09-03 RX ADMIN — HYDROMORPHONE HYDROCHLORIDE 8 MILLIGRAM(S): 2 INJECTION INTRAMUSCULAR; INTRAVENOUS; SUBCUTANEOUS at 09:32

## 2018-09-03 RX ADMIN — GABAPENTIN 300 MILLIGRAM(S): 400 CAPSULE ORAL at 13:53

## 2018-09-03 RX ADMIN — Medication 650 MILLIGRAM(S): at 02:15

## 2018-09-03 RX ADMIN — PANTOPRAZOLE SODIUM 40 MILLIGRAM(S): 20 TABLET, DELAYED RELEASE ORAL at 06:17

## 2018-09-03 RX ADMIN — QUETIAPINE FUMARATE 50 MILLIGRAM(S): 200 TABLET, FILM COATED ORAL at 07:54

## 2018-09-03 RX ADMIN — OXYCODONE HYDROCHLORIDE 20 MILLIGRAM(S): 5 TABLET ORAL at 17:47

## 2018-09-03 RX ADMIN — TRAMADOL HYDROCHLORIDE 50 MILLIGRAM(S): 50 TABLET ORAL at 23:20

## 2018-09-03 RX ADMIN — HYDROMORPHONE HYDROCHLORIDE 8 MILLIGRAM(S): 2 INJECTION INTRAMUSCULAR; INTRAVENOUS; SUBCUTANEOUS at 22:35

## 2018-09-03 RX ADMIN — OXYCODONE HYDROCHLORIDE 20 MILLIGRAM(S): 5 TABLET ORAL at 06:17

## 2018-09-03 RX ADMIN — HYDROMORPHONE HYDROCHLORIDE 8 MILLIGRAM(S): 2 INJECTION INTRAMUSCULAR; INTRAVENOUS; SUBCUTANEOUS at 09:03

## 2018-09-03 RX ADMIN — CHLORHEXIDINE GLUCONATE 1 APPLICATION(S): 213 SOLUTION TOPICAL at 09:04

## 2018-09-03 RX ADMIN — Medication 650 MILLIGRAM(S): at 02:45

## 2018-09-03 RX ADMIN — HYDROMORPHONE HYDROCHLORIDE 8 MILLIGRAM(S): 2 INJECTION INTRAMUSCULAR; INTRAVENOUS; SUBCUTANEOUS at 22:05

## 2018-09-03 RX ADMIN — TRAMADOL HYDROCHLORIDE 50 MILLIGRAM(S): 50 TABLET ORAL at 12:30

## 2018-09-03 NOTE — CHART NOTE - NSCHARTNOTEFT_GEN_A_CORE
Pt seen and examined at bedside  Dressing removed and incision cleaned w/ sterile saline and 4x4s  New dressing of sterile 4x4s and ABDs applied; dressings secured w/ kerlex  Pt tolerated procedure well  NVI post-procedure  D/w attending Dr. Be  Daily dressing changes

## 2018-09-03 NOTE — PROGRESS NOTE ADULT - SUBJECTIVE AND OBJECTIVE BOX
71 yo F, w/ PMH of emphysema, CAD, HTN, revision USAMA with ORIF for periprosthetic R femur fracture on 6/30/18 by Dr. Be, discharged on 7/3/18 to [facility], readmitted from 07/16/07/24/18 w/ worsening R hip pain and decreased ability to ambulate, now s/p right femur vancouver B1 periprosthetic fracture ORIF by Dr. Cornell on 07/19/18, BIBEMS s/p mechanical fall on stairs during the night c/o R hip and R leg pain, redness, swelling, stiffness. Patient denies other complaints. Denies headache, neck stiffness, fever/chills, vision change, chest pain, shortness of breath, difficulty breathing, palpitations, weakness, dizziness, nausea, vomiting, diarrhea, syncope.   From prior, note, patient had initial right hip fracture surgery at [facility] with a hip pin placement in 2015.  She developed avascular necrosis, necessitating a right total hip replacement at [facility] in 2017.  She was well for one year and had an accidental fall taking out her garbage June, 2018. She was then diagnosed with a periprosthetic fracture. Repair consisted of lengthening the right total hip replacement and then stabilization with the distal femur.  At rehabilitation, she had a nontraumatic separation of this distal stabilization which required a periprosthetic revision. Patients/p revision of right hip surgery as of 07/19/18. Patient present with fever and unclrear etiology.  She presented to the ED on 8/20 with R hip and thigh pain, swelling, stiffness, and redness in the setting of a mechanical fall on the stairs.  Then in the hospital spiked to 102.9, blood cx with MRSA  LE CT with lucency at the acetabular screw and prox femur, fluid collection in lateral soft tissue of thigh, an other fluid collection next to vastus intermedius.  Will need biopsy or drainage of the fluid collection. Cultures now show MRSA in Blood  and Urine. Needed procedure to deterine if there is  MRSA IN THE FLUID COLLECTION IN THE LEG. Patient seen today s/p washout of the right leg. Patient found to have anemia probably due to acute blood loss post op. Complais of inability to evacuate stool and needs to pull the feces Out by hand.  Will add Miralax and increase po fluids and  give probiotic. Ccompoaiedn of bleeding from the surgical site       MEDICATIONS  (STANDING):  ALBUTerol/ipratropium for Nebulization 3 milliLiter(s) Nebulizer every 6 hours  aspirin enteric coated 81 milliGRAM(s) Oral daily  buDESOnide   0.5 milliGRAM(s) Respule 0.5 milliGRAM(s) Inhalation two times a day  chlorhexidine 4% Liquid 1 Application(s) Topical <User Schedule>  enoxaparin Injectable 40 milliGRAM(s) SubCutaneous daily  folic acid 1 milliGRAM(s) Oral daily  gabapentin 300 milliGRAM(s) Oral three times a day  lactated ringers. 1000 milliLiter(s) (75 mL/Hr) IV Continuous <Continuous>  nicotine - 21 mG/24Hr(s) Patch 1 patch Transdermal daily  oxyCODONE  ER Tablet 20 milliGRAM(s) Oral every 12 hours  pantoprazole    Tablet 40 milliGRAM(s) Oral before breakfast  polyethylene glycol 3350 17 Gram(s) Oral daily  QUEtiapine 150 milliGRAM(s) Oral at bedtime  senna 2 Tablet(s) Oral at bedtime  thiamine 100 milliGRAM(s) Oral daily  traMADol 50 milliGRAM(s) Oral every 6 hours  vancomycin  IVPB 1000 milliGRAM(s) IV Intermittent every 12 hours    MEDICATIONS  (PRN):  acetaminophen   Tablet 650 milliGRAM(s) Oral every 6 hours PRN For Temp greater than 38.5 C (101.3 F)  acetaminophen   Tablet. 650 milliGRAM(s) Oral every 6 hours PRN Mild Pain (1 - 3)  HYDROmorphone   Tablet 4 milliGRAM(s) Oral every 3 hours PRN Moderate Pain (4 - 6)  HYDROmorphone   Tablet 8 milliGRAM(s) Oral every 3 hours PRN Severe Pain (7 - 10)  QUEtiapine 50 milliGRAM(s) Oral every 6 hours PRN anxiety/insomnia    Vital Signs Last 24 Hrs  T(C): 36.5 (03 Sep 2018 06:21), Max: 37 (02 Sep 2018 13:12)  T(F): 97.7 (03 Sep 2018 06:21), Max: 98.6 (02 Sep 2018 13:12)  HR: 76 (03 Sep 2018 06:21) (75 - 87)  BP: 152/69 (03 Sep 2018 06:21) (146/73 - 162/70)  BP(mean): --  RR: 20 (03 Sep 2018 06:21) (18 - 20)  SpO2: 100% (03 Sep 2018 06:21) (98% - 100%)        PHYSICAL EXAM:  GENERAL: NAD, well nourished and conversant  HEAD:  Atraumatic  EYES: EOM, PERRLA, conjunctiva pink and sclera white  ENT: No tonsillar erythema, exudates, or enlargement, moist mucous membranes, good dentition, no lesions  NECK: Supple, No JVD, normal thyroid, carotids with normal upstrokes and no bruits  CHEST/LUNG: soft rales at the left base  HEART: Regular rate and rhythm, No murmurs, rubs, or gallops  ABDOMEN: Soft, nondistended, no masses, guarding, tenderness or rebound, bowel sounds present  EXTREMITIES:  2+ Peripheral Pulses, No clubbing, cyanosis, or edema. (+) right periprosthetic fracture site edema  LYMPH: No lymphadenopathy noted  SKIN: No rashes or lesions  NERVOUS SYSTEM:  Alert & Oriented X3, normal cognitive function. Motor Strength 5/5 right upper and right lower.  5/5 left upper and left lower extremities, DTRs 2+ intact and symmetric    LABS:                              7.8    7.22  )-----------( 303      ( 02 Sep 2018 06:54 )             25.2                         8.1    6.45  )-----------( 317      ( 01 Sep 2018 10:26 )             26.0       09-02    137  |  101  |  20  ----------------------------<  92  4.3   |  25  |  0.92            CAPILLARY BLOOD GLUCOSE          RADIOLOGY & ADDITIONAL TESTS:

## 2018-09-03 NOTE — PROGRESS NOTE ADULT - ASSESSMENT
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 THR, kika-prosthetic R femur fx (s/p Total Hip revision with ORIF, 6/30/18), recent re-admission (07/16 - 07/24) for worsening R hip pain and decreased ability to ambulate now s/p R femur vancouver B1 periprosthetic fracture ORIF (07/19) who presented to the ED on 8/20 with R hip and thigh pain, swelling, stiffness, and redness in the setting of a mechanical fall on the stairs  in the hospital spiked to 102.9, blood cx with MRSA  LE CT with lucency at the acetabular screw and prox fem, fluid collection in lateral soft tissue of thigh, an other fluid collection next to vastus intermedius  MRI also showed Complex fluid collection along the anterolateral femur adjacent to the hardware and a subcutaneous collection along the proximal lateral right thigh.    high fever, MRSA bacteremia with hip hardware collection in view of complicated hip surgical history and recent ORIF  s/p I&D and washout 8/29, the hardware was not removed and OR cultures with MRSA  worsening anemia after OR  I discussed with ortho regarding hardware removal but pt has a fresh fx and the hardware in the femur cannot be removed, washout and abscess drainage will done  monitoring drug levels       * vanco trough high  * hold one dose and start 750 q 12  * check the level tomorrow again  * c/w rifampin 600 IV as the hardware is infected and cannot be removed  * can place a PICC line  * will need a long course of IV likely 8 weeks and then chronic suppression with bactrim and she will have a high chance of recurrence  * TTE

## 2018-09-03 NOTE — PROGRESS NOTE ADULT - ASSESSMENT
A/P:  72y Female sp R hip periprosthetic I+D  Pain control  DVT ppx, L A81  will need PICC placed  ID recs appreciated   PT/WBAT/OOB  FU labs  Medical management appreciated  Incentive spirometry  Dispo planning    Ortho [id number]

## 2018-09-03 NOTE — PROGRESS NOTE ADULT - SUBJECTIVE AND OBJECTIVE BOX
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    Interval History: pt went to OR s/o washout, OR cultures also with MRSA    ROS:      All other systems negative    Constitutional: no fever, no chills  Head: no trauma  Eyes: no vision changes, no eye pain  ENT:  no sore throat, no rhinorrhea  Cardiovascular:  no chest pain, no palpitation  Respiratory:  no SOB, no cough  GI:  no abd pain, no vomiting, no diarrhea  urinary: no dysuria, no hematuria, no flank pain  musculoskeletal:  right thigh and hip pain after the surgery  skin:  no rash  neurology:  no headache, no seizure, no change in mental status  psych: no anxiety, no depression         Allergies  No Known Allergies        ANTIMICROBIALS:  rifampin IVPB    rifampin IVPB 600 every 24 hours  vancomycin  IVPB 750 every 12 hours      OTHER MEDS:  acetaminophen   Tablet 650 milliGRAM(s) Oral every 6 hours PRN  acetaminophen   Tablet. 650 milliGRAM(s) Oral every 6 hours PRN  ALBUTerol/ipratropium for Nebulization 3 milliLiter(s) Nebulizer every 6 hours  aspirin enteric coated 81 milliGRAM(s) Oral daily  buDESOnide   0.5 milliGRAM(s) Respule 0.5 milliGRAM(s) Inhalation two times a day  chlorhexidine 4% Liquid 1 Application(s) Topical <User Schedule>  enoxaparin Injectable 40 milliGRAM(s) SubCutaneous daily  folic acid 1 milliGRAM(s) Oral daily  gabapentin 300 milliGRAM(s) Oral three times a day  HYDROmorphone   Tablet 4 milliGRAM(s) Oral every 3 hours PRN  HYDROmorphone   Tablet 8 milliGRAM(s) Oral every 3 hours PRN  lactated ringers. 1000 milliLiter(s) IV Continuous <Continuous>  lactobacillus acidophilus 1 Tablet(s) Oral with lunch  multivitamin/minerals 1 Tablet(s) Oral daily  nicotine - 21 mG/24Hr(s) Patch 1 patch Transdermal daily  oxyCODONE  ER Tablet 20 milliGRAM(s) Oral every 12 hours  pantoprazole    Tablet 40 milliGRAM(s) Oral before breakfast  polyethylene glycol 3350 17 Gram(s) Oral daily  polyethylene glycol 3350 17 Gram(s) Oral daily  QUEtiapine 50 milliGRAM(s) Oral every 6 hours PRN  QUEtiapine 150 milliGRAM(s) Oral at bedtime  senna 2 Tablet(s) Oral at bedtime  thiamine 100 milliGRAM(s) Oral daily  traMADol 50 milliGRAM(s) Oral every 6 hours      Vital Signs Last 24 Hrs  T(C): 36.5 (03 Sep 2018 06:21), Max: 37 (02 Sep 2018 13:12)  T(F): 97.7 (03 Sep 2018 06:21), Max: 98.6 (02 Sep 2018 13:12)  HR: 76 (03 Sep 2018 06:21) (75 - 87)  BP: 152/69 (03 Sep 2018 06:21) (146/73 - 162/70)  BP(mean): --  RR: 20 (03 Sep 2018 06:21) (18 - 20)  SpO2: 100% (03 Sep 2018 06:21) (98% - 100%)    Physical Exam:  General:    NAD,  non toxic, walking  Head: atraumatic, normocephalic  Eye: normal sclera and conjunctiva  ENT:    no oropharyngeal lesions,   no LAD,   neck supple  Cardio:     regular S1, S2,  no murmur  Respiratory:    clear b/l,    no wheezing  abd:     soft,   BS +,   no tenderness,    no organomegaly  :   no CVAT,  no suprapubic tenderness,   no  edmondson  Musculoskeletal:   R thigh s/p I&D and wash out with edema but pt was walking  vascular: no lines, normal pulses  Skin:    no rash  Neurologic:     no focal deficit  psych: pt paranoid but now cooperative, asking for pain medications                            8.3    5.50  )-----------( 317      ( 03 Sep 2018 09:40 )             26.5       09-02    137  |  101  |  20  ----------------------------<  92  4.3   |  25  |  0.92    Ca    8.7      02 Sep 2018 05:51            MICROBIOLOGY:  Vancomycin Level, Trough: 17.3 ug/mL (09-03-18 @ 07:54)  Vancomycin Level, Trough: 21.9 ug/mL (09-02-18 @ 21:44)  v  .Other Other, #3 Right Femur  08-29-18   Testing in progress  --  Methicillin resistant Staphylococcus aureus      .Other Other, # 1 Right femur  08-29-18   Testing in progress  --  --      .Blood Blood-Peripheral  08-23-18   No growth at 5 days.  --  --      .Urine Clean Catch (Midstream)  08-23-18   50,000 - 99,000 CFU/mL Methicillin resistant Staphylococcus aureus  --  Methicillin resistant Staphylococcus aureus      .Blood Blood  08-23-18   Growth in aerobic bottle: Coag Negative Staphylococcus  Single set isolate, possible contaminant. Contact  Microbiology if susceptibility testing clinically  indicated.  --    Growth in aerobic bottle: Gram Positive Cocci in Clusters      .Blood Blood  08-22-18   Growth in aerobic bottle: Methicillin resistant Staphylococcus aureus  "Due to technical problems, Proteus sp. will Not be reported as part of  the BCID panel until further notice"  ***Blood Panel PCR results on this specimen are available  approximately 3 hours after the Gram stain result.***  Gram stain, PCR, and/or culture results may not always  correspond due to difference in methodologies.  ************************************************************  This PCR assay was performed using [employer].  The following targets are tested for: Enterococcus,  vancomycin resistant enterococci, Listeria monocytogenes,  coagulase negative staphylococci, S. aureus,  methicillin resistant S. aureus, Streptococcus agalactiae  (Group B), S. pneumoniae, S. pyogenes (Group A),  Acinetobacter baumannii, Enterobacter cloacae, E. coli,  Klebsiella oxytoca, K. pneumoniae, Proteus sp.,  Serratia marcescens, Haemophilus influenzae,  Neisseria meningitidis, Pseudomonas aeruginosa, Candida  albicans, C. glabrata, C krusei, C parapsilosis,  C. tropicalis and the KPC resistance gene.  --  Blood Culture PCR  Methicillin resistant Staphylococcus aureus      .Urine Catheterized  08-20-18   No growth  --  --                RADIOLOGY:  Images below reviewed personally  < from: MR Femur No Cont, Right (08.24.18 @ 22:08) >  IMPRESSION:  Status post right hip revision with susceptibility artifact.  Complex fluid collection along the anterolateral femur adjacent to the   hardware, which may represent a hematoma and/or infection. Additional   subcutaneous collection along the proximal lateral right thigh. Bilateral   knee joint effusions.

## 2018-09-03 NOTE — PROVIDER CONTACT NOTE (CRITICAL VALUE NOTIFICATION) - RECOMMENDATIONS
NP will order a repeat CBC Stat
Pt. already on contact and treatment for MRSA.
Continue same medication as ordered.
continue IV abx

## 2018-09-03 NOTE — PROVIDER CONTACT NOTE (CRITICAL VALUE NOTIFICATION) - PERSON GIVING RESULT:
[facility] Oladipupo
[facility]
Elvira Tilley - Core Lab
July Patel
Laboratory - Evelyn Gould
[facility], Myla Georges
Kody Brand [facility]

## 2018-09-03 NOTE — PROGRESS NOTE ADULT - SUBJECTIVE AND OBJECTIVE BOX
Ortho Progress Note    S: Patient seen and examined. No acute events overnight. Pain well controlled with current regimen. Denies lightheadedness/dizziness, CP/SOB. Tolerating diet.       O:  Physical Exam:  Gen: Laying in bed, NAD, alert and oriented.   Resp: Unlabored breathing  Ext: EHL/FHL/TA/Sol intact          + SILT DP/SP/MCDANIEL/Sa          +DP, extremity WWP  Incision: SS drainage, incision intact      Vital Signs Last 24 Hrs  T(C): 36.5 (03 Sep 2018 06:21), Max: 37 (02 Sep 2018 13:12)  T(F): 97.7 (03 Sep 2018 06:21), Max: 98.6 (02 Sep 2018 13:12)  HR: 76 (03 Sep 2018 06:21) (75 - 87)  BP: 152/69 (03 Sep 2018 06:21) (146/73 - 162/70)  BP(mean): --  RR: 20 (03 Sep 2018 06:21) (18 - 20)  SpO2: 100% (03 Sep 2018 06:21) (98% - 100%)                          7.8    7.22  )-----------( 303      ( 02 Sep 2018 06:54 )             25.2                         8.1    6.45  )-----------( 317      ( 01 Sep 2018 10:26 )             26.0       09-02    137  |  101  |  20  ----------------------------<  92  4.3   |  25  |  0.92

## 2018-09-03 NOTE — PROVIDER CONTACT NOTE (CRITICAL VALUE NOTIFICATION) - ACTION/TREATMENT ORDERED:
Per CATY Ansari, x1 dose of vanco to be ordered. Please draw second blood culture set and am labs before giving dose of abx
Patient already on Vancomycin 1000 mg IVSS twice daily/ isolation required
NP ordered 2 units of blood and a CBC stat to be drawn post trnsfussion
Pt. already on contact and treatment for MRSA.
Continue same medications IV.
PA notified and made aware, as per PA continue current IV abx tx. no further interventions. will continue to monitor.

## 2018-09-03 NOTE — PROVIDER CONTACT NOTE (CRITICAL VALUE NOTIFICATION) - NAME OF MD/NP/PA/DO NOTIFIED:
Amarilys Ansrai NP
CATY Ryan
CATY Ryan
Dr. John - Orthopedics
Elizabeth Cazares NP
Kim Smith
Amarilys Ansari, NP

## 2018-09-03 NOTE — PROVIDER CONTACT NOTE (CRITICAL VALUE NOTIFICATION) - SITUATION
blood cultures drawn 8/22 prelim result gram + cocci in clusters
PT s/p washout of infected right hip/ ORIF operation.
Preliminary lab report - surgical swab from right femur 8/29/18 shows few MRSA resistant.
Pt. surgical swabs obtained on 8/29 positive (+) for MRSA.  1. 3rd right femur surgical swab positive for MRSA.  2. 2nd right femur surgical swab positive for MRSA.  3. 4th right femur surgical swab positive for MRSA.
S/p repeat CBC due to low H/H
blood cultures from 8/2 final result in aerobic bottle MRSA

## 2018-09-03 NOTE — PROVIDER CONTACT NOTE (CRITICAL VALUE NOTIFICATION) - TEST AND RESULT REPORTED:
Hgb 5.9, Hct 18.7
Hgb 6.2, Hct 19.9
Lab report from 8/29
Surgical Swabs from 8/29
Urine culture of 8/23/2018 50,000- 99,000 CFU per ml of MRSA
blood cultures from 8/2 final result in aerobic bottle MRSA
blood cultures drawn 8/22 prelim result gram + cocci in clusters

## 2018-09-04 LAB
ANION GAP SERPL CALC-SCNC: 11 MMOL/L — SIGNIFICANT CHANGE UP (ref 5–17)
BUN SERPL-MCNC: 20 MG/DL — SIGNIFICANT CHANGE UP (ref 7–23)
CALCIUM SERPL-MCNC: 8.9 MG/DL — SIGNIFICANT CHANGE UP (ref 8.4–10.5)
CHLORIDE SERPL-SCNC: 102 MMOL/L — SIGNIFICANT CHANGE UP (ref 96–108)
CO2 SERPL-SCNC: 27 MMOL/L — SIGNIFICANT CHANGE UP (ref 22–31)
CREAT SERPL-MCNC: 0.81 MG/DL — SIGNIFICANT CHANGE UP (ref 0.5–1.3)
GLUCOSE SERPL-MCNC: 85 MG/DL — SIGNIFICANT CHANGE UP (ref 70–99)
POTASSIUM SERPL-MCNC: 4 MMOL/L — SIGNIFICANT CHANGE UP (ref 3.5–5.3)
POTASSIUM SERPL-SCNC: 4 MMOL/L — SIGNIFICANT CHANGE UP (ref 3.5–5.3)
SODIUM SERPL-SCNC: 140 MMOL/L — SIGNIFICANT CHANGE UP (ref 135–145)

## 2018-09-04 PROCEDURE — 99232 SBSQ HOSP IP/OBS MODERATE 35: CPT

## 2018-09-04 PROCEDURE — 71045 X-RAY EXAM CHEST 1 VIEW: CPT | Mod: 26

## 2018-09-04 RX ORDER — HYDROMORPHONE HYDROCHLORIDE 2 MG/ML
4 INJECTION INTRAMUSCULAR; INTRAVENOUS; SUBCUTANEOUS
Qty: 0 | Refills: 0 | Status: DISCONTINUED | OUTPATIENT
Start: 2018-09-04 | End: 2018-09-07

## 2018-09-04 RX ORDER — HYDROMORPHONE HYDROCHLORIDE 2 MG/ML
8 INJECTION INTRAMUSCULAR; INTRAVENOUS; SUBCUTANEOUS
Qty: 0 | Refills: 0 | Status: DISCONTINUED | OUTPATIENT
Start: 2018-09-04 | End: 2018-09-07

## 2018-09-04 RX ADMIN — TRAMADOL HYDROCHLORIDE 50 MILLIGRAM(S): 50 TABLET ORAL at 06:13

## 2018-09-04 RX ADMIN — HYDROMORPHONE HYDROCHLORIDE 8 MILLIGRAM(S): 2 INJECTION INTRAMUSCULAR; INTRAVENOUS; SUBCUTANEOUS at 16:18

## 2018-09-04 RX ADMIN — HYDROMORPHONE HYDROCHLORIDE 8 MILLIGRAM(S): 2 INJECTION INTRAMUSCULAR; INTRAVENOUS; SUBCUTANEOUS at 21:45

## 2018-09-04 RX ADMIN — HYDROMORPHONE HYDROCHLORIDE 8 MILLIGRAM(S): 2 INJECTION INTRAMUSCULAR; INTRAVENOUS; SUBCUTANEOUS at 09:50

## 2018-09-04 RX ADMIN — OXYCODONE HYDROCHLORIDE 20 MILLIGRAM(S): 5 TABLET ORAL at 17:05

## 2018-09-04 RX ADMIN — QUETIAPINE FUMARATE 150 MILLIGRAM(S): 200 TABLET, FILM COATED ORAL at 21:18

## 2018-09-04 RX ADMIN — Medication 1 PATCH: at 12:02

## 2018-09-04 RX ADMIN — OXYCODONE HYDROCHLORIDE 20 MILLIGRAM(S): 5 TABLET ORAL at 17:56

## 2018-09-04 RX ADMIN — TRAMADOL HYDROCHLORIDE 50 MILLIGRAM(S): 50 TABLET ORAL at 11:57

## 2018-09-04 RX ADMIN — TRAMADOL HYDROCHLORIDE 50 MILLIGRAM(S): 50 TABLET ORAL at 06:42

## 2018-09-04 RX ADMIN — Medication 81 MILLIGRAM(S): at 11:57

## 2018-09-04 RX ADMIN — Medication 250 MILLIGRAM(S): at 06:44

## 2018-09-04 RX ADMIN — Medication 1 TABLET(S): at 13:04

## 2018-09-04 RX ADMIN — HYDROMORPHONE HYDROCHLORIDE 8 MILLIGRAM(S): 2 INJECTION INTRAMUSCULAR; INTRAVENOUS; SUBCUTANEOUS at 07:14

## 2018-09-04 RX ADMIN — PANTOPRAZOLE SODIUM 40 MILLIGRAM(S): 20 TABLET, DELAYED RELEASE ORAL at 06:13

## 2018-09-04 RX ADMIN — QUETIAPINE FUMARATE 50 MILLIGRAM(S): 200 TABLET, FILM COATED ORAL at 13:04

## 2018-09-04 RX ADMIN — TRAMADOL HYDROCHLORIDE 50 MILLIGRAM(S): 50 TABLET ORAL at 13:00

## 2018-09-04 RX ADMIN — TRAMADOL HYDROCHLORIDE 50 MILLIGRAM(S): 50 TABLET ORAL at 23:25

## 2018-09-04 RX ADMIN — GABAPENTIN 300 MILLIGRAM(S): 400 CAPSULE ORAL at 13:04

## 2018-09-04 RX ADMIN — Medication 100 MILLIGRAM(S): at 13:04

## 2018-09-04 RX ADMIN — OXYCODONE HYDROCHLORIDE 20 MILLIGRAM(S): 5 TABLET ORAL at 06:12

## 2018-09-04 RX ADMIN — GABAPENTIN 300 MILLIGRAM(S): 400 CAPSULE ORAL at 21:17

## 2018-09-04 RX ADMIN — TRAMADOL HYDROCHLORIDE 50 MILLIGRAM(S): 50 TABLET ORAL at 17:05

## 2018-09-04 RX ADMIN — HYDROMORPHONE HYDROCHLORIDE 8 MILLIGRAM(S): 2 INJECTION INTRAMUSCULAR; INTRAVENOUS; SUBCUTANEOUS at 15:36

## 2018-09-04 RX ADMIN — Medication 1 PATCH: at 11:57

## 2018-09-04 RX ADMIN — Medication 1 MILLIGRAM(S): at 11:57

## 2018-09-04 RX ADMIN — ENOXAPARIN SODIUM 40 MILLIGRAM(S): 100 INJECTION SUBCUTANEOUS at 11:57

## 2018-09-04 RX ADMIN — OXYCODONE HYDROCHLORIDE 20 MILLIGRAM(S): 5 TABLET ORAL at 06:42

## 2018-09-04 RX ADMIN — HYDROMORPHONE HYDROCHLORIDE 8 MILLIGRAM(S): 2 INJECTION INTRAMUSCULAR; INTRAVENOUS; SUBCUTANEOUS at 06:44

## 2018-09-04 RX ADMIN — Medication 650 MILLIGRAM(S): at 19:20

## 2018-09-04 RX ADMIN — Medication 650 MILLIGRAM(S): at 18:19

## 2018-09-04 RX ADMIN — CHLORHEXIDINE GLUCONATE 1 APPLICATION(S): 213 SOLUTION TOPICAL at 09:52

## 2018-09-04 RX ADMIN — HYDROMORPHONE HYDROCHLORIDE 8 MILLIGRAM(S): 2 INJECTION INTRAMUSCULAR; INTRAVENOUS; SUBCUTANEOUS at 10:48

## 2018-09-04 RX ADMIN — HYDROMORPHONE HYDROCHLORIDE 8 MILLIGRAM(S): 2 INJECTION INTRAMUSCULAR; INTRAVENOUS; SUBCUTANEOUS at 20:45

## 2018-09-04 RX ADMIN — TRAMADOL HYDROCHLORIDE 50 MILLIGRAM(S): 50 TABLET ORAL at 17:56

## 2018-09-04 RX ADMIN — Medication 1 TABLET(S): at 11:57

## 2018-09-04 RX ADMIN — Medication 250 MILLIGRAM(S): at 17:05

## 2018-09-04 RX ADMIN — GABAPENTIN 300 MILLIGRAM(S): 400 CAPSULE ORAL at 06:13

## 2018-09-04 RX ADMIN — QUETIAPINE FUMARATE 50 MILLIGRAM(S): 200 TABLET, FILM COATED ORAL at 00:32

## 2018-09-04 NOTE — PROGRESS NOTE ADULT - ASSESSMENT
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 THR, kika-prosthetic R femur fx (s/p Total Hip revision with ORIF, 6/30/18), recent re-admission (07/16 - 07/24) for worsening R hip pain and decreased ability to ambulate now s/p R femur vancouver B1 periprosthetic fracture ORIF (07/19) who presented to the ED on 8/20 with R hip and thigh pain, swelling, stiffness, and redness in the setting of a mechanical fall on the stairs  in the hospital spiked to 102.9, blood cx with MRSA  LE CT with lucency at the acetabular screw and prox fem, fluid collection in lateral soft tissue of thigh, an other fluid collection next to vastus intermedius  MRI also showed Complex fluid collection along the anterolateral femur adjacent to the hardware and a subcutaneous collection along the proximal lateral right thigh.    high fever, MRSA bacteremia with hip hardware collection in view of complicated hip surgical history and recent ORIF  s/p I&D and washout 8/29, the hardware was not removed and OR cultures with MRSA  worsening anemia after OR  I discussed with ortho regarding hardware removal but pt has a fresh fx and the hardware in the femur cannot be removed, washout and abscess drainage will done  monitoring drug levels       * c/w vanco 750 q 12  * check the level tomorrow again  * c/w rifampin 600 IV as the hardware is infected and cannot be removed  * will need a long course of IV likely 8 weeks after the surgery until 10/29 and then chronic suppression with bactrim and she will have a high chance of recurrence  * TTE

## 2018-09-04 NOTE — PROGRESS NOTE ADULT - SUBJECTIVE AND OBJECTIVE BOX
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    Interval History: pt went to OR s/o washout, OR cultures also with MRSA, s/p PICC today    ROS:      All other systems negative    Constitutional: no fever, no chills  Head: no trauma  Eyes: no vision changes, no eye pain  ENT:  no sore throat, no rhinorrhea  Cardiovascular:  no chest pain, no palpitation  Respiratory:  no SOB, no cough  GI:  no abd pain, no vomiting, no diarrhea  urinary: no dysuria, no hematuria, no flank pain  musculoskeletal:  right thigh and hip pain after the surgery  skin:  no rash  neurology:  no headache, no seizure, no change in mental status  psych: no anxiety, no depression         Allergies  No Known Allergies        ANTIMICROBIALS:  rifampin IVPB    rifampin IVPB 600 every 24 hours  vancomycin  IVPB 750 every 12 hours      OTHER MEDS:  acetaminophen   Tablet 650 milliGRAM(s) Oral every 6 hours PRN  acetaminophen   Tablet. 650 milliGRAM(s) Oral every 6 hours PRN  aspirin enteric coated 81 milliGRAM(s) Oral daily  buDESOnide   0.5 milliGRAM(s) Respule 0.5 milliGRAM(s) Inhalation two times a day  chlorhexidine 4% Liquid 1 Application(s) Topical <User Schedule>  enoxaparin Injectable 40 milliGRAM(s) SubCutaneous daily  folic acid 1 milliGRAM(s) Oral daily  gabapentin 300 milliGRAM(s) Oral three times a day  HYDROmorphone   Tablet 4 milliGRAM(s) Oral every 3 hours PRN  HYDROmorphone   Tablet 8 milliGRAM(s) Oral every 3 hours PRN  lactated ringers. 1000 milliLiter(s) IV Continuous <Continuous>  lactobacillus acidophilus 1 Tablet(s) Oral with lunch  multivitamin/minerals 1 Tablet(s) Oral daily  nicotine - 21 mG/24Hr(s) Patch 1 patch Transdermal daily  oxyCODONE  ER Tablet 20 milliGRAM(s) Oral every 12 hours  pantoprazole    Tablet 40 milliGRAM(s) Oral before breakfast  polyethylene glycol 3350 17 Gram(s) Oral daily  polyethylene glycol 3350 17 Gram(s) Oral daily  QUEtiapine 50 milliGRAM(s) Oral every 6 hours PRN  QUEtiapine 150 milliGRAM(s) Oral at bedtime  senna 2 Tablet(s) Oral at bedtime  thiamine 100 milliGRAM(s) Oral daily  traMADol 50 milliGRAM(s) Oral every 6 hours      Vital Signs Last 24 Hrs  T(C): 36.7 (04 Sep 2018 14:20), Max: 37 (03 Sep 2018 17:36)  T(F): 98 (04 Sep 2018 14:20), Max: 98.6 (03 Sep 2018 17:36)  HR: 74 (04 Sep 2018 14:20) (73 - 76)  BP: 148/78 (04 Sep 2018 14:20) (136/76 - 150/73)  BP(mean): --  RR: 18 (04 Sep 2018 14:20) (17 - 18)  SpO2: 98% (04 Sep 2018 14:20) (96% - 99%)    Physical Exam:  General:    NAD,  non toxic, walking  Head: atraumatic, normocephalic  Eye: normal sclera and conjunctiva  ENT:    no oropharyngeal lesions,   no LAD,   neck supple  Cardio:     regular S1, S2,  no murmur  Respiratory:    clear b/l,    no wheezing  abd:     soft,   BS +,   no tenderness,    no organomegaly  :   no CVAT,  no suprapubic tenderness,   no  edmondson  Musculoskeletal:   R thigh s/p I&D and wash out with edema but pt was walking  vascular: PICC line, normal pulses  Skin:    no rash  Neurologic:     no focal deficit  psych: pt paranoid but now cooperative, asking for pain medications                            8.3    5.50  )-----------( 317      ( 03 Sep 2018 09:40 )             26.5       09-04    140  |  102  |  20  ----------------------------<  85  4.0   |  27  |  0.81    Ca    8.9      04 Sep 2018 07:37            MICROBIOLOGY:  v  .Other Other, #3 Right Femur  08-29-18   Testing in progress  --  Methicillin resistant Staphylococcus aureus      .Other Other, # 1 Right femur  08-29-18   Testing in progress  --  --      .Blood Blood-Peripheral  08-23-18   No growth at 5 days.  --  --      .Urine Clean Catch (Midstream)  08-23-18   50,000 - 99,000 CFU/mL Methicillin resistant Staphylococcus aureus  --  Methicillin resistant Staphylococcus aureus      .Blood Blood  08-23-18   Growth in aerobic bottle: Coag Negative Staphylococcus  Single set isolate, possible contaminant. Contact  Microbiology if susceptibility testing clinically  indicated.  --    Growth in aerobic bottle: Gram Positive Cocci in Clusters      .Blood Blood  08-22-18   Growth in aerobic bottle: Methicillin resistant Staphylococcus aureus    .Urine Catheterized  08-20-18   No growth  --  --                RADIOLOGY:  Images below reviewed personally  < from: MR Femur No Cont, Right (08.24.18 @ 22:08) >    IMPRESSION:  Status post right hip revision with susceptibility artifact.  Complex fluid collection along the anterolateral femur adjacent to the   hardware, which may represent a hematoma and/or infection. Additional   subcutaneous collection along the proximal lateral right thigh. Bilateral   knee joint effusions.    < from: Xray Chest 1 View- PORTABLE-Urgent (09.04.18 @ 13:27) >      IMPRESSION:    1.  The tip of the PICC is in the SVC.

## 2018-09-04 NOTE — PROVIDER CONTACT NOTE (OTHER) - SITUATION
Pt completed second unit of PRBCs at 21:30 this past evening.  Stat CBC drawn after unit was completed.
pt c/o pain. requesting " a pain shot"  pt educated on po pain meds. and how pt can not be d/c with IV pain meds. po pain meds encouraged
pt had BM today. refusing miralax today
pt refused all nebulizer treatments today. RN asking for nebulizer order to be prn.
Pre 4th Vanco dose 21.9
Pt states he is having chest tightness post IV ABX
Pt states she was in her hosp room and picked up her pocket book off the floor and hit the back of her head on the overbed table when standing up.  This happened 2 or 3 days ago.
Vanco trough level = 22.9,  NP notified.
pt refusing 2nd set of bc to be drawn for fever this morning

## 2018-09-04 NOTE — PROGRESS NOTE ADULT - ATTENDING COMMENTS
For loculated fluid drainage and possible hardware revision in the AM. The patient is medically stable, medically optimized and has no medical contraindication to surgery tomorrow as required. Exam time 40 minutes including > than 50 % for bedside discussion and counseling. Loculated fluid drainage and hardware revision of the right femur was performed on August 29, 2018. Or sugars are positive for MRSA and the patient has been started on IV vancomycin  and PICC line has been placed for chronic administration.  Pain control and wound care continues with localized drainage, still present. Rehabilitation transfer is anticipated when sepsis is controlled and the patient is hemodynamically stable.

## 2018-09-04 NOTE — PROGRESS NOTE ADULT - SUBJECTIVE AND OBJECTIVE BOX
Ortho Progress Note    S: Patient seen and examined. No acute events overnight. Pain well controlled with current regimen. Dressing changed 9/3.      O:  Physical Exam:  Gen: Laying in bed, NAD, alert and oriented.   RLE  Dressing clean, dry and intact  Ext: EHL/FHL/TA/Sol intact          + SILT DP/SP/MCDANIEL/Sa          +DP, extremity WWP      Vital Signs Last 24 Hrs  T(C): 36.7 (04 Sep 2018 06:09), Max: 37 (03 Sep 2018 13:30)  T(F): 98 (04 Sep 2018 06:09), Max: 98.6 (03 Sep 2018 13:30)  HR: 73 (04 Sep 2018 06:09) (71 - 76)  BP: 136/76 (04 Sep 2018 06:09) (136/76 - 152/69)  BP(mean): --  RR: 17 (04 Sep 2018 06:09) (17 - 20)  SpO2: 97% (04 Sep 2018 06:09) (97% - 100%)

## 2018-09-04 NOTE — PROVIDER CONTACT NOTE (OTHER) - DATE AND TIME:
03-Sep-2018 17:57
03-Sep-2018 18:45
04-Sep-2018 13:25
31-Aug-2018 00:00
01-Sep-2018 03:00
02-Sep-2018 22:11
22-Aug-2018 08:30
26-Aug-2018 18:00
31-Aug-2018 22:43

## 2018-09-04 NOTE — PROGRESS NOTE ADULT - SUBJECTIVE AND OBJECTIVE BOX
73 yo F, w/ PMH of emphysema, CAD, HTN, revision USAMA with ORIF for periprosthetic R femur fracture on 6/30/18 by Dr. Be, discharged on 7/3/18 to [facility] rehab, readmitted from 07/16/07/24/18 w/ worsening R hip pain and decreased ability to ambulate, now s/p right femur vancouver B1 periprosthetic fracture ORIF by Dr. Cornell on 07/19/18, BIBEMS s/p mechanical fall on stairs during the night c/o R hip and R leg pain, redness, swelling, stiffness. Patient denies other complaints. Denies headache, neck stiffness, fever/chills, vision change, chest pain, shortness of breath, difficulty breathing, palpitations, weakness, dizziness, nausea, vomiting, diarrhea, syncope.   From prior, note, patient had initial right hip fracture surgery at [facility] with a hip pin placement in 2015.  She developed avascular necrosis, necessitating a right total hip replacement at [facility] in 2017.  She was well for one year and had an accidental fall taking out her garbage June, 2018. She was then diagnosed with a periprosthetic fracture. Repair consisted of lengthening the right total hip replacement and then stabilization with the distal femur.  At rehabilitation, she had a nontraumatic separation of this distal stabilization which required a periprosthetic revision. Patients/p revision of right hip surgery as of 07/19/18. Patient present with fever and unclrear etiology.  She presented to the ED on 8/20 with R hip and thigh pain, swelling, stiffness, and redness in the setting of a mechanical fall on the stairs.  Then in the hospital spiked to 102.9, blood cx with MRSA  LE CT with lucency at the acetabular screw and prox femur, fluid collection in lateral soft tissue of thigh, an other fluid collection next to vastus intermedius.  Will need biopsy or drainage of the fluid collection. Cultures now show MRSA in Blood  and Urine. Needed procedure to deterine if there is  MRSA IN THE FLUID COLLECTION IN THE LEG. Patient seen today s/p washout of the right leg. Patient found to have anemia probably due to acute blood loss post op. Complais of inability to evacuate stool and needs to pull the feces Out by hand.  Will add Miralax and increase po fluids and  give probiotic. Ccompoaiedn of bleeding from the surgical site       MEDICATIONS  (STANDING):  ALBUTerol/ipratropium for Nebulization 3 milliLiter(s) Nebulizer every 6 hours  aspirin enteric coated 81 milliGRAM(s) Oral daily  buDESOnide   0.5 milliGRAM(s) Respule 0.5 milliGRAM(s) Inhalation two times a day  chlorhexidine 4% Liquid 1 Application(s) Topical <User Schedule>  enoxaparin Injectable 40 milliGRAM(s) SubCutaneous daily  folic acid 1 milliGRAM(s) Oral daily  gabapentin 300 milliGRAM(s) Oral three times a day  lactated ringers. 1000 milliLiter(s) (75 mL/Hr) IV Continuous <Continuous>  nicotine - 21 mG/24Hr(s) Patch 1 patch Transdermal daily  oxyCODONE  ER Tablet 20 milliGRAM(s) Oral every 12 hours  pantoprazole    Tablet 40 milliGRAM(s) Oral before breakfast  polyethylene glycol 3350 17 Gram(s) Oral daily  QUEtiapine 150 milliGRAM(s) Oral at bedtime  senna 2 Tablet(s) Oral at bedtime  thiamine 100 milliGRAM(s) Oral daily  traMADol 50 milliGRAM(s) Oral every 6 hours  vancomycin  IVPB 1000 milliGRAM(s) IV Intermittent every 12 hours    MEDICATIONS  (PRN):  acetaminophen   Tablet 650 milliGRAM(s) Oral every 6 hours PRN For Temp greater than 38.5 C (101.3 F)  acetaminophen   Tablet. 650 milliGRAM(s) Oral every 6 hours PRN Mild Pain (1 - 3)  HYDROmorphone   Tablet 4 milliGRAM(s) Oral every 3 hours PRN Moderate Pain (4 - 6)  HYDROmorphone   Tablet 8 milliGRAM(s) Oral every 3 hours PRN Severe Pain (7 - 10)  QUEtiapine 50 milliGRAM(s) Oral every 6 hours PRN anxiety/insomnia    Vital Signs Last 24 Hrs  T(C): 36.5 (03 Sep 2018 06:21), Max: 37 (02 Sep 2018 13:12)  T(F): 97.7 (03 Sep 2018 06:21), Max: 98.6 (02 Sep 2018 13:12)  HR: 76 (03 Sep 2018 06:21) (75 - 87)  BP: 152/69 (03 Sep 2018 06:21) (146/73 - 162/70)  BP(mean): --  RR: 20 (03 Sep 2018 06:21) (18 - 20)  SpO2: 100% (03 Sep 2018 06:21) (98% - 100%)        PHYSICAL EXAM:  GENERAL: NAD, well nourished and conversant  HEAD:  Atraumatic  EYES: EOM, PERRLA, conjunctiva pink and sclera white  ENT: No tonsillar erythema, exudates, or enlargement, moist mucous membranes, good dentition, no lesions  NECK: Supple, No JVD, normal thyroid, carotids with normal upstrokes and no bruits  CHEST/LUNG: soft rales at the left base  HEART: Regular rate and rhythm, No murmurs, rubs, or gallops  ABDOMEN: Soft, nondistended, no masses, guarding, tenderness or rebound, bowel sounds present  EXTREMITIES:  2+ Peripheral Pulses, No clubbing, cyanosis, or edema. (+) right periprosthetic fracture site edema  LYMPH: No lymphadenopathy noted  SKIN: No rashes or lesions  NERVOUS SYSTEM:  Alert & Oriented X3, normal cognitive function. Motor Strength 5/5 right upper and right lower.  5/5 left upper and left lower extremities, DTRs 2+ intact and symmetric    LABS:                              7.8    7.22  )-----------( 303      ( 02 Sep 2018 06:54 )             25.2                         8.1    6.45  )-----------( 317      ( 01 Sep 2018 10:26 )             26.0       09-02    137  |  101  |  20  ----------------------------<  92  4.3   |  25  |  0.92            CAPILLARY BLOOD GLUCOSE          RADIOLOGY & ADDITIONAL TESTS: 73 yo F, w/ PMH of emphysema, CAD, HTN, revision USAMA with ORIF for periprosthetic R femur fracture on 6/30/18 by Dr. Be, discharged on 7/3/18 to [facility] rehab, readmitted from 07/16/07/24/18 w/ worsening R hip pain and decreased ability to ambulate, now s/p right femur vancouver B1 periprosthetic fracture ORIF by Dr. Cornell on 07/19/18, BIBEMS s/p mechanical fall on stairs during the night c/o R hip and R leg pain, redness, swelling, stiffness. Patient denies other complaints. Denies headache, neck stiffness, fever/chills, vision change, chest pain, shortness of breath, difficulty breathing, palpitations, weakness, dizziness, nausea, vomiting, diarrhea, syncope.   From prior, note, patient had initial right hip fracture surgery at [facility] with a hip pin placement in 2015.  She developed avascular necrosis, necessitating a right total hip replacement at [facility] in 2017.  She was well for one year and had an accidental fall taking out her garbage June, 2018. She was then diagnosed with a periprosthetic fracture. Repair consisted of lengthening the right total hip replacement and then stabilization with the distal femur.  At rehabilitation, she had a nontraumatic separation of this distal stabilization which required a periprosthetic revision. Patients/p revision of right hip surgery as of 07/19/18. Patient present with fever and unclrear etiology.  She presented to the ED on 8/20 with R hip and thigh pain, swelling, stiffness, and redness in the setting of a mechanical fall on the stairs.  Then in the hospital spiked to 102.9, blood cx with MRSA  LE CT with lucency at the acetabular screw and prox femur, fluid collection in lateral soft tissue of thigh, an other fluid collection next to vastus intermedius.  Will need biopsy or drainage of the fluid collection. Cultures now show MRSA in Blood  and Urine. Needed procedure to deterine if there is  MRSA IN THE FLUID COLLECTION IN THE LEG. Patient seen today s/p washout of the right leg. Patient found to have anemia probably due to acute blood loss post op. Complais of inability to evacuate stool and needs to pull the feces Out by hand.  Will add Miralax and increase po fluids and  give probiotic. Ccompoaiedn of bleeding from the surgical site       MEDICATIONS  (STANDING):  aspirin enteric coated 81 milliGRAM(s) Oral daily  buDESOnide   0.5 milliGRAM(s) Respule 0.5 milliGRAM(s) Inhalation two times a day  chlorhexidine 4% Liquid 1 Application(s) Topical <User Schedule>  enoxaparin Injectable 40 milliGRAM(s) SubCutaneous daily  folic acid 1 milliGRAM(s) Oral daily  gabapentin 300 milliGRAM(s) Oral three times a day  lactated ringers. 1000 milliLiter(s) (75 mL/Hr) IV Continuous <Continuous>  lactobacillus acidophilus 1 Tablet(s) Oral with lunch  multivitamin/minerals 1 Tablet(s) Oral daily  nicotine - 21 mG/24Hr(s) Patch 1 patch Transdermal daily  oxyCODONE  ER Tablet 20 milliGRAM(s) Oral every 12 hours  pantoprazole    Tablet 40 milliGRAM(s) Oral before breakfast  polyethylene glycol 3350 17 Gram(s) Oral daily  polyethylene glycol 3350 17 Gram(s) Oral daily  QUEtiapine 150 milliGRAM(s) Oral at bedtime  rifampin IVPB      rifampin IVPB 600 milliGRAM(s) IV Intermittent every 24 hours  senna 2 Tablet(s) Oral at bedtime  thiamine 100 milliGRAM(s) Oral daily  traMADol 50 milliGRAM(s) Oral every 6 hours  vancomycin  IVPB 750 milliGRAM(s) IV Intermittent every 12 hours    MEDICATIONS  (PRN):  acetaminophen   Tablet 650 milliGRAM(s) Oral every 6 hours PRN For Temp greater than 38.5 C (101.3 F)  acetaminophen   Tablet. 650 milliGRAM(s) Oral every 6 hours PRN Mild Pain (1 - 3)  HYDROmorphone   Tablet 4 milliGRAM(s) Oral every 3 hours PRN Moderate Pain (4 - 6)  HYDROmorphone   Tablet 8 milliGRAM(s) Oral every 3 hours PRN Severe Pain (7 - 10)  QUEtiapine 50 milliGRAM(s) Oral every 6 hours PRN anxiety/insomnia    Vital Signs Last 24 Hrs  T(C): 36.9 (04 Sep 2018 16:13), Max: 36.9 (04 Sep 2018 16:13)  T(F): 98.4 (04 Sep 2018 16:13), Max: 98.4 (04 Sep 2018 16:13)  HR: 80 (04 Sep 2018 16:13) (73 - 80)  BP: 146/71 (04 Sep 2018 16:13) (136/76 - 150/73)  BP(mean): --  RR: 18 (04 Sep 2018 16:13) (17 - 18)  SpO2: 97% (04 Sep 2018 16:13) (96% - 98%)        PHYSICAL EXAM:  GENERAL: NAD, well nourished and conversant  HEAD:  Atraumatic  EYES: EOM, PERRLA, conjunctiva pink and sclera white  ENT: No tonsillar erythema, exudates, or enlargement, moist mucous membranes, good dentition, no lesions  NECK: Supple, No JVD, normal thyroid, carotids with normal upstrokes and no bruits  CHEST/LUNG: soft rales at the left base  HEART: Regular rate and rhythm, No murmurs, rubs, or gallops  ABDOMEN: Soft, nondistended, no masses, guarding, tenderness or rebound, bowel sounds present  EXTREMITIES:  2+ Peripheral Pulses, No clubbing, cyanosis, or edema. (+) right periprosthetic fracture site edema  LYMPH: No lymphadenopathy noted  SKIN: No rashes or lesions  NERVOUS SYSTEM:  Alert & Oriented X3, normal cognitive function. Motor Strength 5/5 right upper and right lower.  5/5 left upper and left lower extremities, DTRs 2+ intact and symmetric    LABS:      CBC Full  -  ( 03 Sep 2018 09:40 )  WBC Count : 5.50 K/uL  Hemoglobin : 8.3 g/dL  Hematocrit : 26.5 %  Platelet Count - Automated : 317 K/uL  Mean Cell Volume : 86.9 fl  Mean Cell Hemoglobin : 27.2 pg  Mean Cell Hemoglobin Concentration : 31.3 gm/dL  Auto Neutrophil # : x  Auto Lymphocyte # : x  Auto Monocyte # : x  Auto Eosinophil # : x  Auto Basophil # : x  Auto Neutrophil % : x  Auto Lymphocyte % : x  Auto Monocyte % : x  Auto Eosinophil % : x  Auto Basophil % : x    09-04    140  |  102  |  20  ----------------------------<  85  4.0   |  27  |  0.81    Ca    8.9      04 Sep 2018 07:37 73 yo F, w/ PMH of emphysema, CAD, HTN, revision USAMA with ORIF for periprosthetic R femur fracture on 6/30/18 by Dr. Be, discharged on 7/3/18 to [facility] rehab, readmitted from 07/16/07/24/18 w/ worsening R hip pain and decreased ability to ambulate, now s/p right femur vancouver B1 periprosthetic fracture ORIF by Dr. Cornell on 07/19/18, BIBEMS s/p mechanical fall on stairs during the night c/o R hip and R leg pain, redness, swelling, stiffness. Patient denies other complaints. Denies headache, neck stiffness, fever/chills, vision change, chest pain, shortness of breath, difficulty breathing, palpitations, weakness, dizziness, nausea, vomiting, diarrhea, syncope.   From prior, note, patient had initial right hip fracture surgery at [facility] with a hip pin placement in 2015.  She developed avascular necrosis, necessitating a right total hip replacement at [facility] in 2017.  She was well for one year and had an accidental fall taking out her garbage June, 2018. She was then diagnosed with a periprosthetic fracture. Repair consisted of lengthening the right total hip replacement and then stabilization with the distal femur.  At rehabilitation, she had a nontraumatic separation of this distal stabilization which required a periprosthetic revision. Patients/p revision of right hip surgery as of 07/19/18. Patient present with fever and unclrear etiology.  She presented to the ED on 8/20 with R hip and thigh pain, swelling, stiffness, and redness in the setting of a mechanical fall on the stairs.  Then in the hospital spiked to 102.9, blood cx with MRSA  LE CT with lucency at the acetabular screw and prox femur, fluid collection in lateral soft tissue of thigh, an other fluid collection next to vastus intermedius.  Will need biopsy or drainage of the fluid collection. Cultures now show MRSA in Blood  and Urine. Needed procedure to deterine if there is  MRSA IN THE FLUID COLLECTION IN THE LEG. Patient seen today s/p washout of the right leg with hardware revision. Patient found to have anemia probably due to acute blood loss post op. The patient complains of inability to evacuate stool and needs to pull the feces out by hand.  Will add Miralax and increase po fluids and  give probiotic. Presently, there is of bleeding from the surgical site. PICC line has been placed for  long-term antibiotic administration and is functional.      MEDICATIONS  (STANDING):  aspirin enteric coated 81 milliGRAM(s) Oral daily  buDESOnide   0.5 milliGRAM(s) Respule 0.5 milliGRAM(s) Inhalation two times a day  chlorhexidine 4% Liquid 1 Application(s) Topical <User Schedule>  enoxaparin Injectable 40 milliGRAM(s) SubCutaneous daily  folic acid 1 milliGRAM(s) Oral daily  gabapentin 300 milliGRAM(s) Oral three times a day  lactated ringers. 1000 milliLiter(s) (75 mL/Hr) IV Continuous <Continuous>  lactobacillus acidophilus 1 Tablet(s) Oral with lunch  multivitamin/minerals 1 Tablet(s) Oral daily  nicotine - 21 mG/24Hr(s) Patch 1 patch Transdermal daily  oxyCODONE  ER Tablet 20 milliGRAM(s) Oral every 12 hours  pantoprazole    Tablet 40 milliGRAM(s) Oral before breakfast  polyethylene glycol 3350 17 Gram(s) Oral daily  polyethylene glycol 3350 17 Gram(s) Oral daily  QUEtiapine 150 milliGRAM(s) Oral at bedtime  rifampin IVPB      rifampin IVPB 600 milliGRAM(s) IV Intermittent every 24 hours  senna 2 Tablet(s) Oral at bedtime  thiamine 100 milliGRAM(s) Oral daily  traMADol 50 milliGRAM(s) Oral every 6 hours  vancomycin  IVPB 750 milliGRAM(s) IV Intermittent every 12 hours    MEDICATIONS  (PRN):  acetaminophen   Tablet 650 milliGRAM(s) Oral every 6 hours PRN For Temp greater than 38.5 C (101.3 F)  acetaminophen   Tablet. 650 milliGRAM(s) Oral every 6 hours PRN Mild Pain (1 - 3)  HYDROmorphone   Tablet 4 milliGRAM(s) Oral every 3 hours PRN Moderate Pain (4 - 6)  HYDROmorphone   Tablet 8 milliGRAM(s) Oral every 3 hours PRN Severe Pain (7 - 10)  QUEtiapine 50 milliGRAM(s) Oral every 6 hours PRN anxiety/insomnia    Vital Signs Last 24 Hrs  T(C): 36.9 (04 Sep 2018 16:13), Max: 36.9 (04 Sep 2018 16:13)  T(F): 98.4 (04 Sep 2018 16:13), Max: 98.4 (04 Sep 2018 16:13)  HR: 80 (04 Sep 2018 16:13) (73 - 80)  BP: 146/71 (04 Sep 2018 16:13) (136/76 - 150/73)  BP(mean): --  RR: 18 (04 Sep 2018 16:13) (17 - 18)  SpO2: 97% (04 Sep 2018 16:13) (96% - 98%)        PHYSICAL EXAM:  GENERAL: NAD, well nourished and conversant  HEAD:  Atraumatic  EYES: EOM, PERRLA, conjunctiva pink and sclera white  ENT: No tonsillar erythema, exudates, or enlargement, moist mucous membranes, good dentition, no lesions  NECK: Supple, No JVD, normal thyroid, carotids with normal upstrokes and no bruits  CHEST/LUNG: soft rales at the left base  HEART: Regular rate and rhythm, No murmurs, rubs, or gallops  ABDOMEN: Soft, nondistended, no masses, guarding, tenderness or rebound, bowel sounds present  EXTREMITIES:  2+ Peripheral Pulses, No clubbing, cyanosis, or edema. (+) right periprosthetic fracture site edema  LYMPH: No lymphadenopathy noted  SKIN: No rashes or lesions  NERVOUS SYSTEM:  Alert & Oriented X3, normal cognitive function. Motor Strength 5/5 right upper and right lower.  5/5 left upper and left lower extremities, DTRs 2+ intact and symmetric    LABS:      CBC Full  -  ( 03 Sep 2018 09:40 )  WBC Count : 5.50 K/uL  Hemoglobin : 8.3 g/dL  Hematocrit : 26.5 %  Platelet Count - Automated : 317 K/uL  Mean Cell Volume : 86.9 fl  Mean Cell Hemoglobin : 27.2 pg  Mean Cell Hemoglobin Concentration : 31.3 gm/dL  Auto Neutrophil # : x  Auto Lymphocyte # : x  Auto Monocyte # : x  Auto Eosinophil # : x  Auto Basophil # : x  Auto Neutrophil % : x  Auto Lymphocyte % : x  Auto Monocyte % : x  Auto Eosinophil % : x  Auto Basophil % : x    09-04    140  |  102  |  20  ----------------------------<  85  4.0   |  27  |  0.81    Ca    8.9      04 Sep 2018 07:37

## 2018-09-04 NOTE — PROGRESS NOTE ADULT - ASSESSMENT
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 THR, kika-prosthetic R femur fx (s/p Total Hip revision with ORIF, 6/30/18), recent re-admission (07/16 - 07/24) for worsening R hip pain and decreased ability to ambulate now s/p R femur vancouver B1 periprosthetic fracture ORIF (07/19) who presented to the ED on 8/20 with R hip and thigh pain, swelling, stiffness, and redness in the setting of a mechanical fall on the stairs  in the hospital spiked to 102.9, blood cx with MRSA  LE CT with lucency at the acetabular screw and prox fem, fluid collection in lateral soft tissue of thigh, an other fluid collection next to vastus intermedius  MRI also showed Complex fluid collection along the anterolateral femur adjacent to the hardware and a subcutaneous collection along the proximal lateral right thigh. Patient s/p I&D of right thigh cultures sent await results.   For now continue Vancomycin     c/o constipation and using fingers to remove stool from rectum,. 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 THR, kika-prosthetic R femur fx (s/p Total Hip revision with ORIF, 6/30/18), recent re-admission (07/16 - 07/24) for worsening R hip pain and decreased ability to ambulate now s/p R femur vancouver B1 periprosthetic fracture ORIF (07/19) who presented to the ED on 8/20 with R hip and thigh pain, swelling, stiffness, and redness in the setting of a mechanical fall on the stairs  in the hospital spiked to 102.9, blood cx with MRSA  LE CT with lucency at the acetabular screw and prox fem, fluid collection in lateral soft tissue of thigh, an other fluid collection next to vastus intermedius  MRI also showed Complex fluid collection along the anterolateral femur adjacent to the hardware and a subcutaneous collection along the proximal lateral right thigh. Patient s/p I&D of right thigh cultures sent await results.   For now continue Vancomycin     c/o constipation and using fingers to remove stool from rectum. PICC line placed and is functional for chronic antibiotic administration.

## 2018-09-04 NOTE — PROVIDER CONTACT NOTE (OTHER) - ACTION/TREATMENT ORDERED:
CATY Ruiz aware of above, po meds to remain in place. no IV pain meds to be ordered at this time.
NP Sara aware of above, pt d/c order and place prn nebulizer orders.
NP Sara aware of above. no addl tx at this time
Family at bedside.  team called and came to bedside to evaluate pt and discuss plan of care.  continue to mnt pt status and ensure safety.
Pt scheduled for CBC in AM, along with BMP.
NP ordered Aquaphor ointment to site.
Vanco held for one dose. will resume medication in AM 9-1-18.
as per NP Jerrica Gaviria hold 9/2/18 dose at 22:00 vanco trough to be collected in AM.
see above  refusing nicotene patch

## 2018-09-04 NOTE — PROGRESS NOTE ADULT - ASSESSMENT
A/P:  72y Female sp R hip periprosthetic I+D  Pain control  DVT ppx, L A81  will need PICC placed 9/4  ID recs appreciated   FU TTE  Matti dressing changes  PT/WBAT/OOB  FU labs  Medical management appreciated  Incentive spirometry  Dispo planning

## 2018-09-04 NOTE — PROVIDER CONTACT NOTE (OTHER) - NAME OF MD/NP/PA/DO NOTIFIED:
CATY Ruiz
CATY Ruiz
CATY king
Di HA
Amarilys Ansari NP
CATY Gaviria
CATY Gongora
Dr mccarty _ T2
Krzysztof Best NP

## 2018-09-05 LAB
HCT VFR BLD CALC: 28.7 % — LOW (ref 34.5–45)
HGB BLD-MCNC: 8.7 G/DL — LOW (ref 11.5–15.5)
MCHC RBC-ENTMCNC: 27.2 PG — SIGNIFICANT CHANGE UP (ref 27–34)
MCHC RBC-ENTMCNC: 30.3 GM/DL — LOW (ref 32–36)
MCV RBC AUTO: 89.7 FL — SIGNIFICANT CHANGE UP (ref 80–100)
PLATELET # BLD AUTO: 377 K/UL — SIGNIFICANT CHANGE UP (ref 150–400)
RBC # BLD: 3.2 M/UL — LOW (ref 3.8–5.2)
RBC # FLD: 20.5 % — HIGH (ref 10.3–14.5)
VANCOMYCIN TROUGH SERPL-MCNC: 13.9 UG/ML — SIGNIFICANT CHANGE UP (ref 10–20)
WBC # BLD: 6.04 K/UL — SIGNIFICANT CHANGE UP (ref 3.8–10.5)
WBC # FLD AUTO: 6.04 K/UL — SIGNIFICANT CHANGE UP (ref 3.8–10.5)

## 2018-09-05 PROCEDURE — 99233 SBSQ HOSP IP/OBS HIGH 50: CPT

## 2018-09-05 RX ORDER — PANTOPRAZOLE SODIUM 20 MG/1
1 TABLET, DELAYED RELEASE ORAL
Qty: 0 | Refills: 0 | COMMUNITY
Start: 2018-09-05

## 2018-09-05 RX ORDER — THIAMINE MONONITRATE (VIT B1) 100 MG
1 TABLET ORAL
Qty: 0 | Refills: 0 | COMMUNITY
Start: 2018-09-05

## 2018-09-05 RX ORDER — VANCOMYCIN HCL 1 G
1000 VIAL (EA) INTRAVENOUS EVERY 12 HOURS
Qty: 0 | Refills: 0 | Status: DISCONTINUED | OUTPATIENT
Start: 2018-09-05 | End: 2018-09-07

## 2018-09-05 RX ORDER — ACETAMINOPHEN 500 MG
2 TABLET ORAL
Qty: 0 | Refills: 0 | DISCHARGE
Start: 2018-09-05

## 2018-09-05 RX ORDER — ACETAMINOPHEN 500 MG
2 TABLET ORAL
Qty: 0 | Refills: 0 | COMMUNITY
Start: 2018-09-05

## 2018-09-05 RX ORDER — OXYCODONE HYDROCHLORIDE 5 MG/1
1 TABLET ORAL
Qty: 0 | Refills: 0 | COMMUNITY
Start: 2018-09-05

## 2018-09-05 RX ORDER — GABAPENTIN 400 MG/1
1 CAPSULE ORAL
Qty: 0 | Refills: 0 | COMMUNITY
Start: 2018-09-05

## 2018-09-05 RX ORDER — ASPIRIN/CALCIUM CARB/MAGNESIUM 324 MG
1 TABLET ORAL
Qty: 0 | Refills: 0 | COMMUNITY
Start: 2018-09-05

## 2018-09-05 RX ORDER — THIAMINE MONONITRATE (VIT B1) 100 MG
1 TABLET ORAL
Qty: 0 | Refills: 0 | COMMUNITY

## 2018-09-05 RX ORDER — VANCOMYCIN HCL 1 G
1 VIAL (EA) INTRAVENOUS
Qty: 0 | Refills: 0 | COMMUNITY
Start: 2018-09-05

## 2018-09-05 RX ORDER — MULTIVIT-MIN/FERROUS GLUCONATE 9 MG/15 ML
1 LIQUID (ML) ORAL
Qty: 0 | Refills: 0 | COMMUNITY
Start: 2018-09-05

## 2018-09-05 RX ADMIN — PANTOPRAZOLE SODIUM 40 MILLIGRAM(S): 20 TABLET, DELAYED RELEASE ORAL at 06:27

## 2018-09-05 RX ADMIN — TRAMADOL HYDROCHLORIDE 50 MILLIGRAM(S): 50 TABLET ORAL at 18:20

## 2018-09-05 RX ADMIN — GABAPENTIN 300 MILLIGRAM(S): 400 CAPSULE ORAL at 13:33

## 2018-09-05 RX ADMIN — HYDROMORPHONE HYDROCHLORIDE 8 MILLIGRAM(S): 2 INJECTION INTRAMUSCULAR; INTRAVENOUS; SUBCUTANEOUS at 22:13

## 2018-09-05 RX ADMIN — Medication 100 MILLIGRAM(S): at 12:13

## 2018-09-05 RX ADMIN — OXYCODONE HYDROCHLORIDE 20 MILLIGRAM(S): 5 TABLET ORAL at 06:27

## 2018-09-05 RX ADMIN — ENOXAPARIN SODIUM 40 MILLIGRAM(S): 100 INJECTION SUBCUTANEOUS at 12:06

## 2018-09-05 RX ADMIN — Medication 0.5 MILLIGRAM(S): at 06:29

## 2018-09-05 RX ADMIN — TRAMADOL HYDROCHLORIDE 50 MILLIGRAM(S): 50 TABLET ORAL at 06:27

## 2018-09-05 RX ADMIN — OXYCODONE HYDROCHLORIDE 20 MILLIGRAM(S): 5 TABLET ORAL at 18:20

## 2018-09-05 RX ADMIN — Medication 1 TABLET(S): at 12:16

## 2018-09-05 RX ADMIN — OXYCODONE HYDROCHLORIDE 20 MILLIGRAM(S): 5 TABLET ORAL at 18:52

## 2018-09-05 RX ADMIN — CHLORHEXIDINE GLUCONATE 1 APPLICATION(S): 213 SOLUTION TOPICAL at 12:03

## 2018-09-05 RX ADMIN — HYDROMORPHONE HYDROCHLORIDE 8 MILLIGRAM(S): 2 INJECTION INTRAMUSCULAR; INTRAVENOUS; SUBCUTANEOUS at 21:13

## 2018-09-05 RX ADMIN — Medication 1 MILLIGRAM(S): at 12:06

## 2018-09-05 RX ADMIN — HYDROMORPHONE HYDROCHLORIDE 8 MILLIGRAM(S): 2 INJECTION INTRAMUSCULAR; INTRAVENOUS; SUBCUTANEOUS at 15:39

## 2018-09-05 RX ADMIN — Medication 1 PATCH: at 11:21

## 2018-09-05 RX ADMIN — Medication 1 TABLET(S): at 12:05

## 2018-09-05 RX ADMIN — GABAPENTIN 300 MILLIGRAM(S): 400 CAPSULE ORAL at 06:27

## 2018-09-05 RX ADMIN — QUETIAPINE FUMARATE 150 MILLIGRAM(S): 200 TABLET, FILM COATED ORAL at 21:14

## 2018-09-05 RX ADMIN — Medication 650 MILLIGRAM(S): at 12:44

## 2018-09-05 RX ADMIN — QUETIAPINE FUMARATE 50 MILLIGRAM(S): 200 TABLET, FILM COATED ORAL at 12:17

## 2018-09-05 RX ADMIN — TRAMADOL HYDROCHLORIDE 50 MILLIGRAM(S): 50 TABLET ORAL at 18:53

## 2018-09-05 RX ADMIN — TRAMADOL HYDROCHLORIDE 50 MILLIGRAM(S): 50 TABLET ORAL at 07:00

## 2018-09-05 RX ADMIN — TRAMADOL HYDROCHLORIDE 50 MILLIGRAM(S): 50 TABLET ORAL at 12:13

## 2018-09-05 RX ADMIN — Medication 1 PATCH: at 12:07

## 2018-09-05 RX ADMIN — TRAMADOL HYDROCHLORIDE 50 MILLIGRAM(S): 50 TABLET ORAL at 00:25

## 2018-09-05 RX ADMIN — OXYCODONE HYDROCHLORIDE 20 MILLIGRAM(S): 5 TABLET ORAL at 07:00

## 2018-09-05 RX ADMIN — TRAMADOL HYDROCHLORIDE 50 MILLIGRAM(S): 50 TABLET ORAL at 13:22

## 2018-09-05 RX ADMIN — HYDROMORPHONE HYDROCHLORIDE 8 MILLIGRAM(S): 2 INJECTION INTRAMUSCULAR; INTRAVENOUS; SUBCUTANEOUS at 03:24

## 2018-09-05 RX ADMIN — QUETIAPINE FUMARATE 50 MILLIGRAM(S): 200 TABLET, FILM COATED ORAL at 02:21

## 2018-09-05 RX ADMIN — HYDROMORPHONE HYDROCHLORIDE 8 MILLIGRAM(S): 2 INJECTION INTRAMUSCULAR; INTRAVENOUS; SUBCUTANEOUS at 02:24

## 2018-09-05 RX ADMIN — Medication 0.5 MILLIGRAM(S): at 18:24

## 2018-09-05 RX ADMIN — SENNA PLUS 2 TABLET(S): 8.6 TABLET ORAL at 21:13

## 2018-09-05 RX ADMIN — HYDROMORPHONE HYDROCHLORIDE 8 MILLIGRAM(S): 2 INJECTION INTRAMUSCULAR; INTRAVENOUS; SUBCUTANEOUS at 09:38

## 2018-09-05 RX ADMIN — Medication 650 MILLIGRAM(S): at 13:22

## 2018-09-05 RX ADMIN — Medication 250 MILLIGRAM(S): at 06:27

## 2018-09-05 RX ADMIN — Medication 250 MILLIGRAM(S): at 19:26

## 2018-09-05 RX ADMIN — HYDROMORPHONE HYDROCHLORIDE 8 MILLIGRAM(S): 2 INJECTION INTRAMUSCULAR; INTRAVENOUS; SUBCUTANEOUS at 10:09

## 2018-09-05 RX ADMIN — GABAPENTIN 300 MILLIGRAM(S): 400 CAPSULE ORAL at 21:13

## 2018-09-05 RX ADMIN — Medication 81 MILLIGRAM(S): at 12:06

## 2018-09-05 RX ADMIN — HYDROMORPHONE HYDROCHLORIDE 8 MILLIGRAM(S): 2 INJECTION INTRAMUSCULAR; INTRAVENOUS; SUBCUTANEOUS at 16:15

## 2018-09-05 NOTE — PROGRESS NOTE ADULT - SUBJECTIVE AND OBJECTIVE BOX
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    Interval History: pt went to OR s/o washout, OR cultures also with MRSA,  PICC today    ROS:      All other systems negative    Constitutional: no fever, no chills  Head: no trauma  Eyes: no vision changes, no eye pain  ENT:  no sore throat, no rhinorrhea  Cardiovascular:  no chest pain, no palpitation  Respiratory:  no SOB, no cough  GI:  no abd pain, no vomiting, no diarrhea  urinary: no dysuria, no hematuria, no flank pain  musculoskeletal:  right thigh and hip pain after the surgery  skin:  no rash  neurology:  no headache, no seizure, no change in mental status  psych: no anxiety, no depression           Allergies  No Known Allergies        ANTIMICROBIALS:  rifampin IVPB    rifampin IVPB 600 every 24 hours  vancomycin  IVPB 750 every 12 hours      OTHER MEDS:  acetaminophen   Tablet 650 milliGRAM(s) Oral every 6 hours PRN  acetaminophen   Tablet. 650 milliGRAM(s) Oral every 6 hours PRN  aspirin enteric coated 81 milliGRAM(s) Oral daily  buDESOnide   0.5 milliGRAM(s) Respule 0.5 milliGRAM(s) Inhalation two times a day  chlorhexidine 4% Liquid 1 Application(s) Topical <User Schedule>  enoxaparin Injectable 40 milliGRAM(s) SubCutaneous daily  folic acid 1 milliGRAM(s) Oral daily  gabapentin 300 milliGRAM(s) Oral three times a day  HYDROmorphone   Tablet 4 milliGRAM(s) Oral every 3 hours PRN  HYDROmorphone   Tablet 8 milliGRAM(s) Oral every 3 hours PRN  lactated ringers. 1000 milliLiter(s) IV Continuous <Continuous>  lactobacillus acidophilus 1 Tablet(s) Oral with lunch  multivitamin/minerals 1 Tablet(s) Oral daily  nicotine - 21 mG/24Hr(s) Patch 1 patch Transdermal daily  oxyCODONE  ER Tablet 20 milliGRAM(s) Oral every 12 hours  pantoprazole    Tablet 40 milliGRAM(s) Oral before breakfast  polyethylene glycol 3350 17 Gram(s) Oral daily  polyethylene glycol 3350 17 Gram(s) Oral daily  QUEtiapine 50 milliGRAM(s) Oral every 6 hours PRN  QUEtiapine 150 milliGRAM(s) Oral at bedtime  senna 2 Tablet(s) Oral at bedtime  thiamine 100 milliGRAM(s) Oral daily  traMADol 50 milliGRAM(s) Oral every 6 hours      Vital Signs Last 24 Hrs  T(C): 36.7 (05 Sep 2018 09:27), Max: 36.7 (05 Sep 2018 09:27)  T(F): 98.1 (05 Sep 2018 09:27), Max: 98.1 (05 Sep 2018 09:27)  HR: 72 (05 Sep 2018 09:27) (72 - 76)  BP: 139/74 (05 Sep 2018 09:27) (123/73 - 139/74)  BP(mean): --  RR: 18 (05 Sep 2018 09:27) (18 - 18)  SpO2: 96% (05 Sep 2018 09:27) (96% - 98%)    Physical Exam:  General:    NAD,  non toxic, walking  Head: atraumatic, normocephalic  Eye: normal sclera and conjunctiva  ENT:    no oropharyngeal lesions,   no LAD,   neck supple  Cardio:     regular S1, S2,  no murmur  Respiratory:    clear b/l,    no wheezing  abd:     soft,   BS +,   no tenderness,    no organomegaly  :   no CVAT,  no suprapubic tenderness,   no  edmondson  Musculoskeletal:   R thigh s/p I&D and wash out with edema but pt was walking  vascular: PICC line, normal pulses  Skin:    no rash  Neurologic:     no focal deficit  psych: pt paranoid but now cooperative, asking for pain medications                          8.7    6.04  )-----------( 377      ( 05 Sep 2018 08:00 )             28.7       09-04    140  |  102  |  20  ----------------------------<  85  4.0   |  27  |  0.81    Ca    8.9      04 Sep 2018 07:37            MICROBIOLOGY:  Vancomycin Level, Trough: 13.9 ug/mL (09-05-18 @ 07:33)  v  .Other Other, #3 Right Femur  08-29-18   Testing in progress  --  Methicillin resistant Staphylococcus aureus      .Other Other, # 1 Right femur  08-29-18   Testing in progress  --  --      .Blood Blood-Peripheral  08-23-18   No growth at 5 days.  --  --      .Urine Clean Catch (Midstream)  08-23-18   50,000 - 99,000 CFU/mL Methicillin resistant Staphylococcus aureus  --  Methicillin resistant Staphylococcus aureus      .Blood Blood  08-23-18   Growth in aerobic bottle: Coag Negative Staphylococcus  Single set isolate, possible contaminant. Contact  Microbiology if susceptibility testing clinically  indicated.  --    Growth in aerobic bottle: Gram Positive Cocci in Clusters      .Blood Blood  08-22-18   Growth in aerobic bottle: Methicillin resistant Staphylococcus aureus      .Urine Catheterized  08-20-18   No growth  --  --                RADIOLOGY:  Images below reviewed personally  < from: MR Femur No Cont, Right (08.24.18 @ 22:08) >  IMPRESSION:  Status post right hip revision with susceptibility artifact.  Complex fluid collection along the anterolateral femur adjacent to the   hardware, which may represent a hematoma and/or infection. Additional   subcutaneous collection along the proximal lateral right thigh. Bilateral   knee joint effusions.

## 2018-09-05 NOTE — PROGRESS NOTE ADULT - ASSESSMENT
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 THR, kika-prosthetic R femur fx (s/p Total Hip revision with ORIF, 6/30/18), recent re-admission (07/16 - 07/24) for worsening R hip pain and decreased ability to ambulate now s/p R femur vancouver B1 periprosthetic fracture ORIF (07/19) who presented to the ED on 8/20 with R hip and thigh pain, swelling, stiffness, and redness in the setting of a mechanical fall on the stairs  in the hospital spiked to 102.9, blood cx with MRSA  LE CT with lucency at the acetabular screw and prox fem, fluid collection in lateral soft tissue of thigh, an other fluid collection next to vastus intermedius  MRI also showed Complex fluid collection along the anterolateral femur adjacent to the hardware and a subcutaneous collection along the proximal lateral right thigh. Patient s/p I&D of right thigh cultures sent await results.   For now continue Vancomycin.

## 2018-09-05 NOTE — PROGRESS NOTE ADULT - SUBJECTIVE AND OBJECTIVE BOX
73 yo F, w/ PMH of emphysema, CAD, HTN, revision USAMA with ORIF for periprosthetic R femur fracture on 6/30/18 by Dr. Be, discharged on 7/3/18 to [facility] rehab, readmitted from 07/16/07/24/18 w/ worsening R hip pain and decreased ability to ambulate, now s/p right femur vancouver B1 periprosthetic fracture ORIF by Dr. Cornell on 07/19/18, BIBEMS s/p mechanical fall on stairs during the night c/o R hip and R leg pain, redness, swelling, stiffness. Patient denies other complaints. Denies headache, neck stiffness, fever/chills, vision change, chest pain, shortness of breath, difficulty breathing, palpitations, weakness, dizziness, nausea, vomiting, diarrhea, syncope.   From prior, note, patient had initial right hip fracture surgery at [facility] with a hip pin placement in 2015.  She developed avascular necrosis, necessitating a right total hip replacement at [facility] in 2017.  She was well for one year and had an accidental fall taking out her garbage June, 2018. She was then diagnosed with a periprosthetic fracture. Repair consisted of lengthening the right total hip replacement and then stabilization with the distal femur.  At rehabilitation, she had a nontraumatic separation of this distal stabilization which required a periprosthetic revision. Patients/p revision of right hip surgery as of 07/19/18. Patient present with fever and unclrear etiology.  She presented to the ED on 8/20 with R hip and thigh pain, swelling, stiffness, and redness in the setting of a mechanical fall on the stairs.  Then in the hospital spiked to 102.9, blood cx with MRSA  LE CT with lucency at the acetabular screw and prox femur, fluid collection in lateral soft tissue of thigh, an other fluid collection next to vastus intermedius.  Will need biopsy or drainage of the fluid collection. Cultures now show MRSA in Blood  and Urine. Needed procedure to deterine if there is  MRSA IN THE FLUID COLLECTION IN THE LEG. Patient seen today s/p washout of the right leg. Patient found to have anemia probably due to acute blood loss post op. Complais of inability to evacuate stool and needs to pull the feces Out by hand.  Will add Miralax and increase po fluids and  give probiotic. Patient doing well with physical therapy      MEDICATIONS  (STANDING):  aspirin enteric coated 81 milliGRAM(s) Oral daily  buDESOnide   0.5 milliGRAM(s) Respule 0.5 milliGRAM(s) Inhalation two times a day  chlorhexidine 4% Liquid 1 Application(s) Topical <User Schedule>  enoxaparin Injectable 40 milliGRAM(s) SubCutaneous daily  folic acid 1 milliGRAM(s) Oral daily  gabapentin 300 milliGRAM(s) Oral three times a day  lactated ringers. 1000 milliLiter(s) (75 mL/Hr) IV Continuous <Continuous>  lactobacillus acidophilus 1 Tablet(s) Oral with lunch  multivitamin/minerals 1 Tablet(s) Oral daily  nicotine - 21 mG/24Hr(s) Patch 1 patch Transdermal daily  oxyCODONE  ER Tablet 20 milliGRAM(s) Oral every 12 hours  pantoprazole    Tablet 40 milliGRAM(s) Oral before breakfast  polyethylene glycol 3350 17 Gram(s) Oral daily  polyethylene glycol 3350 17 Gram(s) Oral daily  QUEtiapine 150 milliGRAM(s) Oral at bedtime  rifampin IVPB      rifampin IVPB 600 milliGRAM(s) IV Intermittent every 24 hours  senna 2 Tablet(s) Oral at bedtime  thiamine 100 milliGRAM(s) Oral daily  traMADol 50 milliGRAM(s) Oral every 6 hours  vancomycin  IVPB 1000 milliGRAM(s) IV Intermittent every 12 hours    MEDICATIONS  (PRN):  acetaminophen   Tablet 650 milliGRAM(s) Oral every 6 hours PRN For Temp greater than 38.5 C (101.3 F)  acetaminophen   Tablet. 650 milliGRAM(s) Oral every 6 hours PRN Mild Pain (1 - 3)  HYDROmorphone   Tablet 4 milliGRAM(s) Oral every 3 hours PRN Moderate Pain (4 - 6)  HYDROmorphone   Tablet 8 milliGRAM(s) Oral every 3 hours PRN Severe Pain (7 - 10)  QUEtiapine 50 milliGRAM(s) Oral every 6 hours PRN anxiety/insomnia          VITALS:   T(C): 36.3 (09-05-18 @ 20:45), Max: 36.7 (09-05-18 @ 09:27)  HR: 78 (09-05-18 @ 20:45) (72 - 78)  BP: 145/76 (09-05-18 @ 20:45) (123/73 - 145/76)  RR: 18 (09-05-18 @ 20:45) (18 - 18)  SpO2: 95% (09-05-18 @ 20:45) (95% - 98%)  Wt(kg): --    PHYSICAL EXAM:  GENERAL: NAD, well nourished and conversant  HEAD:  Atraumatic  EYES: EOM, PERRLA, conjunctiva pink and sclera white  ENT: No tonsillar erythema, exudates, or enlargement, moist mucous membranes, good dentition, no lesions  NECK: Supple, No JVD, normal thyroid, carotids with normal upstrokes and no bruits  CHEST/LUNG: soft rales at the left base  HEART: Regular rate and rhythm, No murmurs, rubs, or gallops  ABDOMEN: Soft, nondistended, no masses, guarding, tenderness or rebound, bowel sounds present  EXTREMITIES:  2+ Peripheral Pulses, No clubbing, cyanosis, or edema. (+) right periprosthetic fracture site edema  LYMPH: No lymphadenopathy noted  SKIN: No rashes or lesions  NERVOUS SYSTEM:  Alert & Oriented X3, normal cognitive function. Motor Strength 5/5 right upper and right lower.  5/5 left upper and left lower extremities, DTRs 2+ intact and symmetric    LABS:        CBC Full  -  ( 05 Sep 2018 08:00 )  WBC Count : 6.04 K/uL  Hemoglobin : 8.7 g/dL  Hematocrit : 28.7 %  Platelet Count - Automated : 377 K/uL  Mean Cell Volume : 89.7 fl  Mean Cell Hemoglobin : 27.2 pg  Mean Cell Hemoglobin Concentration : 30.3 gm/dL  Auto Neutrophil # : x  Auto Lymphocyte # : x  Auto Monocyte # : x  Auto Eosinophil # : x  Auto Basophil # : x  Auto Neutrophil % : x  Auto Lymphocyte % : x  Auto Monocyte % : x  Auto Eosinophil % : x  Auto Basophil % : x    09-04    140  |  102  |  20  ----------------------------<  85  4.0   |  27  |  0.81    Ca    8.9      04 Sep 2018 07:37            CAPILLARY BLOOD GLUCOSE          RADIOLOGY & ADDITIONAL TESTS:

## 2018-09-05 NOTE — PROGRESS NOTE ADULT - ASSESSMENT
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 THR, kika-prosthetic R femur fx (s/p Total Hip revision with ORIF, 6/30/18), recent re-admission (07/16 - 07/24) for worsening R hip pain and decreased ability to ambulate now s/p R femur vancouver B1 periprosthetic fracture ORIF (07/19) who presented to the ED on 8/20 with R hip and thigh pain, swelling, stiffness, and redness in the setting of a mechanical fall on the stairs  in the hospital spiked to 102.9, blood cx with MRSA  LE CT with lucency at the acetabular screw and prox fem, fluid collection in lateral soft tissue of thigh, an other fluid collection next to vastus intermedius  MRI also showed Complex fluid collection along the anterolateral femur adjacent to the hardware and a subcutaneous collection along the proximal lateral right thigh.    high fever, MRSA bacteremia with hip hardware collection in view of complicated hip surgical history and recent ORIF  s/p I&D and washout 8/29, the hardware was not removed and OR cultures with MRSA  worsening anemia after OR  I discussed with ortho regarding hardware removal but pt has a fresh fx and the hardware in the femur cannot be removed, washout and abscess drainage will done  monitoring drug levels     * vanco level 13  * increase vanco to 1 q 12  * check the level tomorrow again  * c/w rifampin 600 IV as the hardware is infected and cannot be removed  * will need a long course of IV likely 8 weeks after the surgery until 10/29 and then chronic suppression with bactrim and she will have a high chance of recurrence

## 2018-09-06 LAB
ANION GAP SERPL CALC-SCNC: 12 MMOL/L — SIGNIFICANT CHANGE UP (ref 5–17)
BUN SERPL-MCNC: 27 MG/DL — HIGH (ref 7–23)
CALCIUM SERPL-MCNC: 8.3 MG/DL — LOW (ref 8.4–10.5)
CHLORIDE SERPL-SCNC: 102 MMOL/L — SIGNIFICANT CHANGE UP (ref 96–108)
CO2 SERPL-SCNC: 26 MMOL/L — SIGNIFICANT CHANGE UP (ref 22–31)
CREAT SERPL-MCNC: 0.98 MG/DL — SIGNIFICANT CHANGE UP (ref 0.5–1.3)
GLUCOSE SERPL-MCNC: 122 MG/DL — HIGH (ref 70–99)
POTASSIUM SERPL-MCNC: 3.9 MMOL/L — SIGNIFICANT CHANGE UP (ref 3.5–5.3)
POTASSIUM SERPL-SCNC: 3.9 MMOL/L — SIGNIFICANT CHANGE UP (ref 3.5–5.3)
SODIUM SERPL-SCNC: 140 MMOL/L — SIGNIFICANT CHANGE UP (ref 135–145)

## 2018-09-06 PROCEDURE — 99232 SBSQ HOSP IP/OBS MODERATE 35: CPT

## 2018-09-06 RX ADMIN — HYDROMORPHONE HYDROCHLORIDE 8 MILLIGRAM(S): 2 INJECTION INTRAMUSCULAR; INTRAVENOUS; SUBCUTANEOUS at 21:09

## 2018-09-06 RX ADMIN — GABAPENTIN 300 MILLIGRAM(S): 400 CAPSULE ORAL at 05:21

## 2018-09-06 RX ADMIN — TRAMADOL HYDROCHLORIDE 50 MILLIGRAM(S): 50 TABLET ORAL at 17:50

## 2018-09-06 RX ADMIN — Medication 650 MILLIGRAM(S): at 12:32

## 2018-09-06 RX ADMIN — TRAMADOL HYDROCHLORIDE 50 MILLIGRAM(S): 50 TABLET ORAL at 05:22

## 2018-09-06 RX ADMIN — Medication 650 MILLIGRAM(S): at 13:35

## 2018-09-06 RX ADMIN — HYDROMORPHONE HYDROCHLORIDE 8 MILLIGRAM(S): 2 INJECTION INTRAMUSCULAR; INTRAVENOUS; SUBCUTANEOUS at 15:15

## 2018-09-06 RX ADMIN — HYDROMORPHONE HYDROCHLORIDE 8 MILLIGRAM(S): 2 INJECTION INTRAMUSCULAR; INTRAVENOUS; SUBCUTANEOUS at 11:50

## 2018-09-06 RX ADMIN — GABAPENTIN 300 MILLIGRAM(S): 400 CAPSULE ORAL at 21:02

## 2018-09-06 RX ADMIN — OXYCODONE HYDROCHLORIDE 20 MILLIGRAM(S): 5 TABLET ORAL at 17:06

## 2018-09-06 RX ADMIN — TRAMADOL HYDROCHLORIDE 50 MILLIGRAM(S): 50 TABLET ORAL at 01:50

## 2018-09-06 RX ADMIN — Medication 250 MILLIGRAM(S): at 05:21

## 2018-09-06 RX ADMIN — Medication 1 TABLET(S): at 11:03

## 2018-09-06 RX ADMIN — Medication 1 PATCH: at 11:00

## 2018-09-06 RX ADMIN — HYDROMORPHONE HYDROCHLORIDE 8 MILLIGRAM(S): 2 INJECTION INTRAMUSCULAR; INTRAVENOUS; SUBCUTANEOUS at 03:38

## 2018-09-06 RX ADMIN — HYDROMORPHONE HYDROCHLORIDE 8 MILLIGRAM(S): 2 INJECTION INTRAMUSCULAR; INTRAVENOUS; SUBCUTANEOUS at 20:09

## 2018-09-06 RX ADMIN — HYDROMORPHONE HYDROCHLORIDE 8 MILLIGRAM(S): 2 INJECTION INTRAMUSCULAR; INTRAVENOUS; SUBCUTANEOUS at 02:38

## 2018-09-06 RX ADMIN — GABAPENTIN 300 MILLIGRAM(S): 400 CAPSULE ORAL at 13:35

## 2018-09-06 RX ADMIN — Medication 1 TABLET(S): at 12:32

## 2018-09-06 RX ADMIN — HYDROMORPHONE HYDROCHLORIDE 8 MILLIGRAM(S): 2 INJECTION INTRAMUSCULAR; INTRAVENOUS; SUBCUTANEOUS at 10:55

## 2018-09-06 RX ADMIN — Medication 250 MILLIGRAM(S): at 17:07

## 2018-09-06 RX ADMIN — Medication 0.5 MILLIGRAM(S): at 05:21

## 2018-09-06 RX ADMIN — TRAMADOL HYDROCHLORIDE 50 MILLIGRAM(S): 50 TABLET ORAL at 00:50

## 2018-09-06 RX ADMIN — CHLORHEXIDINE GLUCONATE 1 APPLICATION(S): 213 SOLUTION TOPICAL at 10:56

## 2018-09-06 RX ADMIN — Medication 1 MILLIGRAM(S): at 11:01

## 2018-09-06 RX ADMIN — OXYCODONE HYDROCHLORIDE 20 MILLIGRAM(S): 5 TABLET ORAL at 05:22

## 2018-09-06 RX ADMIN — TRAMADOL HYDROCHLORIDE 50 MILLIGRAM(S): 50 TABLET ORAL at 17:07

## 2018-09-06 RX ADMIN — Medication 81 MILLIGRAM(S): at 11:01

## 2018-09-06 RX ADMIN — Medication 1 PATCH: at 11:02

## 2018-09-06 RX ADMIN — OXYCODONE HYDROCHLORIDE 20 MILLIGRAM(S): 5 TABLET ORAL at 18:00

## 2018-09-06 RX ADMIN — ENOXAPARIN SODIUM 40 MILLIGRAM(S): 100 INJECTION SUBCUTANEOUS at 11:01

## 2018-09-06 RX ADMIN — HYDROMORPHONE HYDROCHLORIDE 8 MILLIGRAM(S): 2 INJECTION INTRAMUSCULAR; INTRAVENOUS; SUBCUTANEOUS at 14:38

## 2018-09-06 RX ADMIN — TRAMADOL HYDROCHLORIDE 50 MILLIGRAM(S): 50 TABLET ORAL at 13:35

## 2018-09-06 RX ADMIN — PANTOPRAZOLE SODIUM 40 MILLIGRAM(S): 20 TABLET, DELAYED RELEASE ORAL at 05:21

## 2018-09-06 RX ADMIN — TRAMADOL HYDROCHLORIDE 50 MILLIGRAM(S): 50 TABLET ORAL at 12:32

## 2018-09-06 RX ADMIN — QUETIAPINE FUMARATE 150 MILLIGRAM(S): 200 TABLET, FILM COATED ORAL at 21:03

## 2018-09-06 RX ADMIN — Medication 100 MILLIGRAM(S): at 12:32

## 2018-09-06 RX ADMIN — TRAMADOL HYDROCHLORIDE 50 MILLIGRAM(S): 50 TABLET ORAL at 23:03

## 2018-09-06 NOTE — PROGRESS NOTE ADULT - SUBJECTIVE AND OBJECTIVE BOX
Pt S/E at bedside, no acute events overnight, pain controlled    Vital Signs Last 24 Hrs  T(C): 37.1 (06 Sep 2018 21:30), Max: 37.1 (06 Sep 2018 21:30)  T(F): 98.7 (06 Sep 2018 21:30), Max: 98.7 (06 Sep 2018 21:30)  HR: 81 (06 Sep 2018 21:30) (74 - 81)  BP: 140/68 (06 Sep 2018 21:34) (130/86 - 190/72)  RR: 18 (06 Sep 2018 21:30) (18 - 18)  SpO2: 98% (06 Sep 2018 21:30) (94% - 98%)  Gen: NAD, AAOx3    Right Lower Extremity:  Dressing w/ minor serous saturation; no purulent drainage noted  +EHL/FHL/TA/GS  SILT L3-S1  +DP/PT Pulses  Compartments soft  No calf TTP B/L    Dressing changed 9/6  Incision without signs purulent drainage  Serous drainage observed on bandages  New dressing 4x4s, ABDs, kerlex applied  Pt NVI pre and post dressing change

## 2018-09-06 NOTE — PROGRESS NOTE ADULT - ASSESSMENT
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 THR, kika-prosthetic R femur fx (s/p Total Hip revision with ORIF, 6/30/18), recent re-admission (07/16 - 07/24) for worsening R hip pain and decreased ability to ambulate now s/p R femur vancouver B1 periprosthetic fracture ORIF (07/19) who presented to the ED on 8/20 with R hip and thigh pain, swelling, stiffness, and redness in the setting of a mechanical fall on the stairs  in the hospital spiked to 102.9, blood cx with MRSA  LE CT with lucency at the acetabular screw and prox fem, fluid collection in lateral soft tissue of thigh, an other fluid collection next to vastus intermedius  MRI also showed Complex fluid collection along the anterolateral femur adjacent to the hardware and a subcutaneous collection along the proximal lateral right thigh.    high fever, MRSA bacteremia with hip hardware collection in view of complicated hip surgical history and recent ORIF  s/p I&D and washout 8/29, the hardware was not removed and OR cultures with MRSA  worsening anemia after OR  I discussed with ortho regarding hardware removal but pt has a fresh fx and the hardware in the femur cannot be removed, washout and abscess drainage will done  monitoring drug levels     * c/w vanco to 1 q 12  * c/w rifampin 600 IV as the hardware is infected and cannot be removed  * will need a long course of IV likely 8 weeks after the surgery until 10/29 and then chronic suppression with bactrim and she will have a high chance of recurrence  * weekly CBC, CMP and vanco trough after DC  * f/u with ID clinic in 4-5 weeks

## 2018-09-06 NOTE — PROGRESS NOTE ADULT - SUBJECTIVE AND OBJECTIVE BOX
73 yo F, w/ PMH of emphysema, CAD, HTN, revision USAMA with ORIF for periprosthetic R femur fracture on 6/30/18 by Dr. Be, discharged on 7/3/18 to [facility] rehab, readmitted from 07/16/07/24/18 w/ worsening R hip pain and decreased ability to ambulate, now s/p right femur vancouver B1 periprosthetic fracture ORIF by Dr. Cornell on 07/19/18, BIBEMS s/p mechanical fall on stairs during the night c/o R hip and R leg pain, redness, swelling, stiffness. Patient denies other complaints. Denies headache, neck stiffness, fever/chills, vision change, chest pain, shortness of breath, difficulty breathing, palpitations, weakness, dizziness, nausea, vomiting, diarrhea, syncope.   From prior, note, patient had initial right hip fracture surgery at [facility] with a hip pin placement in 2015.  She developed avascular necrosis, necessitating a right total hip replacement at [facility] in 2017.  She was well for one year and had an accidental fall taking out her garbage June, 2018. She was then diagnosed with a periprosthetic fracture. Repair consisted of lengthening the right total hip replacement and then stabilization with the distal femur.  At rehabilitation, she had a nontraumatic separation of this distal stabilization which required a periprosthetic revision. Patients/p revision of right hip surgery as of 07/19/18. Patient present with fever and unclrear etiology.  She presented to the ED on 8/20 with R hip and thigh pain, swelling, stiffness, and redness in the setting of a mechanical fall on the stairs.  Then in the hospital spiked to 102.9, blood cx with MRSA  LE CT with lucency at the acetabular screw and prox femur, fluid collection in lateral soft tissue of thigh, an other fluid collection next to vastus intermedius.  Will need biopsy or drainage of the fluid collection. Cultures now show MRSA in Blood  and Urine. Needed procedure to deterine if there is  MRSA IN THE FLUID COLLECTION IN THE LEG. Patient seen today s/p washout of the right leg. Patient found to have anemia probably due to acute blood loss post op. Complais of inability to evacuate stool and needs to pull the feces Out by hand.  Will add Miralax and increase po fluids and  give probiotic. Patient doing well with physical therapy  MEDICATIONS  (STANDING):  aspirin enteric coated 81 milliGRAM(s) Oral daily  buDESOnide   0.5 milliGRAM(s) Respule 0.5 milliGRAM(s) Inhalation two times a day  chlorhexidine 4% Liquid 1 Application(s) Topical <User Schedule>  enoxaparin Injectable 40 milliGRAM(s) SubCutaneous daily  folic acid 1 milliGRAM(s) Oral daily  gabapentin 300 milliGRAM(s) Oral three times a day  lactated ringers. 1000 milliLiter(s) (75 mL/Hr) IV Continuous <Continuous>  lactobacillus acidophilus 1 Tablet(s) Oral with lunch  multivitamin/minerals 1 Tablet(s) Oral daily  nicotine - 21 mG/24Hr(s) Patch 1 patch Transdermal daily  oxyCODONE  ER Tablet 20 milliGRAM(s) Oral every 12 hours  pantoprazole    Tablet 40 milliGRAM(s) Oral before breakfast  polyethylene glycol 3350 17 Gram(s) Oral daily  polyethylene glycol 3350 17 Gram(s) Oral daily  QUEtiapine 150 milliGRAM(s) Oral at bedtime  rifampin IVPB      rifampin IVPB 600 milliGRAM(s) IV Intermittent every 24 hours  senna 2 Tablet(s) Oral at bedtime  thiamine 100 milliGRAM(s) Oral daily  traMADol 50 milliGRAM(s) Oral every 6 hours  vancomycin  IVPB 1000 milliGRAM(s) IV Intermittent every 12 hours    MEDICATIONS  (PRN):  acetaminophen   Tablet 650 milliGRAM(s) Oral every 6 hours PRN For Temp greater than 38.5 C (101.3 F)  acetaminophen   Tablet. 650 milliGRAM(s) Oral every 6 hours PRN Mild Pain (1 - 3)  HYDROmorphone   Tablet 4 milliGRAM(s) Oral every 3 hours PRN Moderate Pain (4 - 6)  HYDROmorphone   Tablet 8 milliGRAM(s) Oral every 3 hours PRN Severe Pain (7 - 10)  QUEtiapine 50 milliGRAM(s) Oral every 6 hours PRN anxiety/insomnia          VITALS:   T(C): 37.1 (09-06-18 @ 21:30), Max: 37.1 (09-06-18 @ 21:30)  HR: 81 (09-06-18 @ 21:30) (74 - 81)  BP: 140/68 (09-06-18 @ 21:34) (130/86 - 190/72)  RR: 18 (09-06-18 @ 21:30) (18 - 18)  SpO2: 98% (09-06-18 @ 21:30) (94% - 98%)  Wt(kg): --    HYSICAL EXAM:  GENERAL: NAD, well nourished and conversant  HEAD:  Atraumatic  EYES: EOM, PERRLA, conjunctiva pink and sclera white  ENT: No tonsillar erythema, exudates, or enlargement, moist mucous membranes, good dentition, no lesions  NECK: Supple, No JVD, normal thyroid, carotids with normal upstrokes and no bruits  CHEST/LUNG: soft rales at the left base  HEART: Regular rate and rhythm, No murmurs, rubs, or gallops  ABDOMEN: Soft, nondistended, no masses, guarding, tenderness or rebound, bowel sounds present  EXTREMITIES:  2+ Peripheral Pulses, No clubbing, cyanosis, or edema. (+) right periprosthetic fracture site edema  LYMPH: No lymphadenopathy noted  SKIN: No rashes or lesions  NERVOUS SYSTEM:  Alert & Oriented X3, normal cognitive function. Motor Strength 5/5 right upper and right lower.  5/5 left upper and left lower extremities, DTRs 2+ intact and symmetric    LABS:        CBC Full  -  ( 05 Sep 2018 08:00 )  WBC Count : 6.04 K/uL  Hemoglobin : 8.7 g/dL  Hematocrit : 28.7 %  Platelet Count - Automated : 377 K/uL  Mean Cell Volume : 89.7 fl  Mean Cell Hemoglobin : 27.2 pg  Mean Cell Hemoglobin Concentration : 30.3 gm/dL  Auto Neutrophil # : x  Auto Lymphocyte # : x  Auto Monocyte # : x  Auto Eosinophil # : x  Auto Basophil # : x  Auto Neutrophil % : x  Auto Lymphocyte % : x  Auto Monocyte % : x  Auto Eosinophil % : x  Auto Basophil % : x    09-06    140  |  102  |  27<H>  ----------------------------<  122<H>  3.9   |  26  |  0.98    Ca    8.3<L>      06 Sep 2018 04:30            CAPILLARY BLOOD GLUCOSE          RADIOLOGY & ADDITIONAL TESTS:

## 2018-09-06 NOTE — PROGRESS NOTE ADULT - SUBJECTIVE AND OBJECTIVE BOX
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    Interval History: pt went to OR s/o washout, OR cultures also with MRSA,  s/p PICC    ROS:      All other systems negative    Constitutional: no fever, no chills  Head: no trauma  Eyes: no vision changes, no eye pain  ENT:  no sore throat, no rhinorrhea  Cardiovascular:  no chest pain, no palpitation  Respiratory:  no SOB, no cough  GI:  no abd pain, no vomiting, no diarrhea  urinary: no dysuria, no hematuria, no flank pain  musculoskeletal:  right thigh and hip pain after the surgery  skin:  no rash  neurology:  no headache, no seizure, no change in mental status  psych: no anxiety, no depression         Allergies  No Known Allergies        ANTIMICROBIALS:  rifampin IVPB    rifampin IVPB 600 every 24 hours  vancomycin  IVPB 1000 every 12 hours      OTHER MEDS:  acetaminophen   Tablet 650 milliGRAM(s) Oral every 6 hours PRN  acetaminophen   Tablet. 650 milliGRAM(s) Oral every 6 hours PRN  aspirin enteric coated 81 milliGRAM(s) Oral daily  buDESOnide   0.5 milliGRAM(s) Respule 0.5 milliGRAM(s) Inhalation two times a day  chlorhexidine 4% Liquid 1 Application(s) Topical <User Schedule>  enoxaparin Injectable 40 milliGRAM(s) SubCutaneous daily  folic acid 1 milliGRAM(s) Oral daily  gabapentin 300 milliGRAM(s) Oral three times a day  HYDROmorphone   Tablet 4 milliGRAM(s) Oral every 3 hours PRN  HYDROmorphone   Tablet 8 milliGRAM(s) Oral every 3 hours PRN  lactated ringers. 1000 milliLiter(s) IV Continuous <Continuous>  lactobacillus acidophilus 1 Tablet(s) Oral with lunch  multivitamin/minerals 1 Tablet(s) Oral daily  nicotine - 21 mG/24Hr(s) Patch 1 patch Transdermal daily  oxyCODONE  ER Tablet 20 milliGRAM(s) Oral every 12 hours  pantoprazole    Tablet 40 milliGRAM(s) Oral before breakfast  polyethylene glycol 3350 17 Gram(s) Oral daily  polyethylene glycol 3350 17 Gram(s) Oral daily  QUEtiapine 50 milliGRAM(s) Oral every 6 hours PRN  QUEtiapine 150 milliGRAM(s) Oral at bedtime  senna 2 Tablet(s) Oral at bedtime  thiamine 100 milliGRAM(s) Oral daily  traMADol 50 milliGRAM(s) Oral every 6 hours      Vital Signs Last 24 Hrs  T(C): 36.8 (06 Sep 2018 10:57), Max: 36.8 (06 Sep 2018 10:57)  T(F): 98.3 (06 Sep 2018 10:57), Max: 98.3 (06 Sep 2018 10:57)  HR: 78 (06 Sep 2018 10:57) (74 - 78)  BP: 130/86 (06 Sep 2018 10:57) (130/86 - 145/76)  BP(mean): --  RR: 18 (06 Sep 2018 10:57) (18 - 18)  SpO2: 94% (06 Sep 2018 10:57) (94% - 96%)    Physical Exam:  General:    NAD,  non toxic, walking  Head: atraumatic, normocephalic  Eye: normal sclera and conjunctiva  ENT:    no oropharyngeal lesions,   no LAD,   neck supple  Cardio:     regular S1, S2,  no murmur  Respiratory:    clear b/l,    no wheezing  abd:     soft,   BS +,   no tenderness,    no organomegaly  :   no CVAT,  no suprapubic tenderness,   no  edmondson  Musculoskeletal:   R thigh s/p I&D and wash out with edema but pt was walking  vascular: PICC line, normal pulses  Skin:    no rash  Neurologic:     no focal deficit  psych: pt paranoid but now cooperative, asking for pain medications                            8.7    6.04  )-----------( 377      ( 05 Sep 2018 08:00 )             28.7       09-06    140  |  102  |  27<H>  ----------------------------<  122<H>  3.9   |  26  |  0.98    Ca    8.3<L>      06 Sep 2018 04:30            MICROBIOLOGY:  v  .Other Other, #3 Right Femur  08-29-18   Testing in progress  --  Methicillin resistant Staphylococcus aureus      .Other Other, # 1 Right femur  08-29-18   Testing in progress  --  --      .Blood Blood-Peripheral  08-23-18   No growth at 5 days.  --  --      .Urine Clean Catch (Midstream)  08-23-18   50,000 - 99,000 CFU/mL Methicillin resistant Staphylococcus aureus  --  Methicillin resistant Staphylococcus aureus      .Blood Blood  08-23-18   Growth in aerobic bottle: Coag Negative Staphylococcus  Single set isolate, possible contaminant. Contact  Microbiology if susceptibility testing clinically  indicated.  --    Growth in aerobic bottle: Gram Positive Cocci in Clusters      .Blood Blood  08-22-18   Growth in aerobic bottle: Methicillin resistant Staphylococcus aureus    .Urine Catheterized  08-20-18   No growth  --  --                RADIOLOGY:  Images below reviewed personally  < from: MR Femur No Cont, Right (08.24.18 @ 22:08) >  IMPRESSION:  Status post right hip revision with susceptibility artifact.  Complex fluid collection along the anterolateral femur adjacent to the   hardware, which may represent a hematoma and/or infection. Additional   subcutaneous collection along the proximal lateral right thigh. Bilateral   knee joint effusions.

## 2018-09-06 NOTE — PROGRESS NOTE ADULT - ASSESSMENT
A/P:  72y Female sp R hip periprosthetic I+D  Pain control  DVT ppx  ID recs appreciated   FU TTE  Matti dressing changes  PT/WBAT/OOB  FU labs  Medical management appreciated  Incentive spirometry  Dispo planning

## 2018-09-07 VITALS
RESPIRATION RATE: 18 BRPM | TEMPERATURE: 98 F | DIASTOLIC BLOOD PRESSURE: 72 MMHG | SYSTOLIC BLOOD PRESSURE: 144 MMHG | HEART RATE: 79 BPM | OXYGEN SATURATION: 97 %

## 2018-09-07 LAB — VANCOMYCIN TROUGH SERPL-MCNC: 18 UG/ML — SIGNIFICANT CHANGE UP (ref 10–20)

## 2018-09-07 RX ORDER — BUDESONIDE, MICRONIZED 100 %
2 POWDER (GRAM) MISCELLANEOUS
Qty: 0 | Refills: 0 | COMMUNITY
Start: 2018-09-07

## 2018-09-07 RX ORDER — LACTOBACILLUS ACIDOPHILUS 100MM CELL
1 CAPSULE ORAL
Qty: 0 | Refills: 0 | COMMUNITY
Start: 2018-09-07

## 2018-09-07 RX ORDER — HYDROMORPHONE HYDROCHLORIDE 2 MG/ML
1 INJECTION INTRAMUSCULAR; INTRAVENOUS; SUBCUTANEOUS
Qty: 0 | Refills: 0 | COMMUNITY
Start: 2018-09-07

## 2018-09-07 RX ORDER — SENNA PLUS 8.6 MG/1
2 TABLET ORAL
Qty: 0 | Refills: 0 | COMMUNITY
Start: 2018-09-07

## 2018-09-07 RX ORDER — QUETIAPINE FUMARATE 200 MG/1
3 TABLET, FILM COATED ORAL
Qty: 0 | Refills: 0 | COMMUNITY
Start: 2018-09-07

## 2018-09-07 RX ORDER — SENNA PLUS 8.6 MG/1
1 TABLET ORAL
Qty: 0 | Refills: 0 | COMMUNITY
Start: 2018-09-07

## 2018-09-07 RX ORDER — QUETIAPINE FUMARATE 200 MG/1
1 TABLET, FILM COATED ORAL
Qty: 0 | Refills: 0 | COMMUNITY
Start: 2018-09-07

## 2018-09-07 RX ORDER — NICOTINE POLACRILEX 2 MG
1 GUM BUCCAL
Qty: 0 | Refills: 0 | COMMUNITY
Start: 2018-09-07

## 2018-09-07 RX ORDER — TRAMADOL HYDROCHLORIDE 50 MG/1
1 TABLET ORAL
Qty: 0 | Refills: 0 | COMMUNITY
Start: 2018-09-07

## 2018-09-07 RX ADMIN — QUETIAPINE FUMARATE 50 MILLIGRAM(S): 200 TABLET, FILM COATED ORAL at 02:00

## 2018-09-07 RX ADMIN — ENOXAPARIN SODIUM 40 MILLIGRAM(S): 100 INJECTION SUBCUTANEOUS at 12:13

## 2018-09-07 RX ADMIN — Medication 1 TABLET(S): at 12:13

## 2018-09-07 RX ADMIN — TRAMADOL HYDROCHLORIDE 50 MILLIGRAM(S): 50 TABLET ORAL at 07:45

## 2018-09-07 RX ADMIN — HYDROMORPHONE HYDROCHLORIDE 8 MILLIGRAM(S): 2 INJECTION INTRAMUSCULAR; INTRAVENOUS; SUBCUTANEOUS at 10:15

## 2018-09-07 RX ADMIN — TRAMADOL HYDROCHLORIDE 50 MILLIGRAM(S): 50 TABLET ORAL at 00:03

## 2018-09-07 RX ADMIN — Medication 250 MILLIGRAM(S): at 06:17

## 2018-09-07 RX ADMIN — OXYCODONE HYDROCHLORIDE 20 MILLIGRAM(S): 5 TABLET ORAL at 06:17

## 2018-09-07 RX ADMIN — Medication 1 MILLIGRAM(S): at 12:12

## 2018-09-07 RX ADMIN — TRAMADOL HYDROCHLORIDE 50 MILLIGRAM(S): 50 TABLET ORAL at 12:12

## 2018-09-07 RX ADMIN — Medication 81 MILLIGRAM(S): at 12:12

## 2018-09-07 RX ADMIN — HYDROMORPHONE HYDROCHLORIDE 8 MILLIGRAM(S): 2 INJECTION INTRAMUSCULAR; INTRAVENOUS; SUBCUTANEOUS at 01:46

## 2018-09-07 RX ADMIN — Medication 100 MILLIGRAM(S): at 12:13

## 2018-09-07 RX ADMIN — GABAPENTIN 300 MILLIGRAM(S): 400 CAPSULE ORAL at 06:17

## 2018-09-07 RX ADMIN — TRAMADOL HYDROCHLORIDE 50 MILLIGRAM(S): 50 TABLET ORAL at 12:56

## 2018-09-07 RX ADMIN — Medication 1 PATCH: at 12:12

## 2018-09-07 RX ADMIN — HYDROMORPHONE HYDROCHLORIDE 8 MILLIGRAM(S): 2 INJECTION INTRAMUSCULAR; INTRAVENOUS; SUBCUTANEOUS at 00:46

## 2018-09-07 RX ADMIN — Medication 1 TABLET(S): at 12:12

## 2018-09-07 RX ADMIN — CHLORHEXIDINE GLUCONATE 1 APPLICATION(S): 213 SOLUTION TOPICAL at 13:04

## 2018-09-07 RX ADMIN — Medication 1 PATCH: at 12:14

## 2018-09-07 RX ADMIN — Medication 650 MILLIGRAM(S): at 10:46

## 2018-09-07 RX ADMIN — OXYCODONE HYDROCHLORIDE 20 MILLIGRAM(S): 5 TABLET ORAL at 07:01

## 2018-09-07 RX ADMIN — HYDROMORPHONE HYDROCHLORIDE 8 MILLIGRAM(S): 2 INJECTION INTRAMUSCULAR; INTRAVENOUS; SUBCUTANEOUS at 13:50

## 2018-09-07 RX ADMIN — HYDROMORPHONE HYDROCHLORIDE 8 MILLIGRAM(S): 2 INJECTION INTRAMUSCULAR; INTRAVENOUS; SUBCUTANEOUS at 12:54

## 2018-09-07 RX ADMIN — PANTOPRAZOLE SODIUM 40 MILLIGRAM(S): 20 TABLET, DELAYED RELEASE ORAL at 06:17

## 2018-09-07 RX ADMIN — TRAMADOL HYDROCHLORIDE 50 MILLIGRAM(S): 50 TABLET ORAL at 07:01

## 2018-09-07 RX ADMIN — GABAPENTIN 300 MILLIGRAM(S): 400 CAPSULE ORAL at 13:04

## 2018-09-07 RX ADMIN — HYDROMORPHONE HYDROCHLORIDE 8 MILLIGRAM(S): 2 INJECTION INTRAMUSCULAR; INTRAVENOUS; SUBCUTANEOUS at 16:12

## 2018-09-07 RX ADMIN — HYDROMORPHONE HYDROCHLORIDE 8 MILLIGRAM(S): 2 INJECTION INTRAMUSCULAR; INTRAVENOUS; SUBCUTANEOUS at 09:27

## 2018-09-07 RX ADMIN — Medication 650 MILLIGRAM(S): at 11:30

## 2018-09-07 RX ADMIN — QUETIAPINE FUMARATE 50 MILLIGRAM(S): 200 TABLET, FILM COATED ORAL at 10:45

## 2018-09-07 NOTE — PROGRESS NOTE ADULT - PROBLEM SELECTOR PROBLEM 4
Fever
CAD (coronary artery disease)
Fever

## 2018-09-07 NOTE — PROGRESS NOTE ADULT - ASSESSMENT
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 THR, kika-prosthetic R femur fx (s/p Total Hip revision with ORIF, 6/30/18), recent re-admission (07/16 - 07/24) for worsening R hip pain and decreased ability to ambulate now s/p R femur [place] B1 periprosthetic fracture ORIF (07/19) who presented to the ED on 8/20 with R hip and thigh pain, swelling, stiffness, and redness in the setting of a mechanical fall on the stairs  in the hospital spiked to 102.9, blood cx with MRSA  LE CT with lucency at the acetabular screw and prox fem, fluid collection in lateral soft tissue of thigh, an other fluid collection next to vastus intermedius  MRI also showed Complex fluid collection along the anterolateral femur adjacent to the hardware and a subcutaneous collection along the proximal lateral right thigh. Patient s/p I&D of right thigh cultures sent await results.   For now continue Vancomycin. 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 THR, kika-prosthetic R femur fx (s/p Total Hip revision with ORIF, 6/30/18), recent re-admission (07/16 - 07/24) for worsening R hip pain and decreased ability to ambulate now s/p R femur [place] B1 periprosthetic fracture ORIF (07/19) who presented to the ED on 8/20 with R hip and thigh pain, swelling, stiffness, and redness in the setting of a mechanical fall on the stairs  in the hospital spiked to 102.9, blood cx with MRSA  LE CT with lucency at the acetabular screw and prox fem, fluid collection in lateral soft tissue of thigh, an other fluid collection next to vastus intermedius  MRI also showed Complex fluid collection along the anterolateral femur adjacent to the hardware and a subcutaneous collection along the proximal lateral right thigh. Patient s/p I&D of right thigh cultures sent await results.   For now continue Vancomycin  Wound drainage continues with dressing changes  twice a day.  She continues IV vancomycin  for MRSA  cellulitis/osteomyelitis.  The patient is fully ambulatory and orthopedically has been cleared  for transfer to rehabilitation  .

## 2018-09-07 NOTE — PROGRESS NOTE ADULT - PROBLEM SELECTOR PLAN 4
Patient scheduled for MRI for Hip as well as IR guided drainage  continue current abx  continue to follow cultures
Patient scheduled for MRI for Hip as well as IR guided drainage  continue current abx  continue to follow cultures  PATIENT REFUSED IR DRAINAGE
Patient with no new fever continue vanco for MRSA bacteremia  further abx as per ID
Patient with no new fever continue vanco for MRSA bacteremia  awaiting PICC
Patient with no new fever continue vanco for MRSA bacteremia  awaiting PICC
No CP or SOB . Home meds . cardiology consulted.
No new fevers  continue IV abx via PICC  will eventually need to change to chronic suppressive therapy
No new fevers  continue IV abx via PICC  will eventually need to change to chronic suppressive therapy
Patient scheduled for MRI for Hip as well as IR guided drainage  continue current abx for MRSA bacteremia  continue to follow cultures  PATIENT REFUSED IR DRAINAGE
Patient will need 8 weeks after the surgery until 10/29 and then chronic suppression with bactrim and she will have a high chance of recurrence as per ID  PICC line functioning
Patient with no new fever continue vanco for MRSA bacteremia
Patient with no new fever continue vanco for MRSA bacteremia  awaiting PICC
Patient with no new fever continue vanco for MRSA bacteremia  further abx as per ID
Patient with no new fever continue vanco for MRSA bacteremia  further abx as per ID
cultures sent  follow for further fever  will consider an ID consult

## 2018-09-07 NOTE — PROGRESS NOTE ADULT - SUBJECTIVE AND OBJECTIVE BOX
No acute events overnight. Pain well controlled with pain medications.     Vital Signs Last 24 Hrs  T(C): 36.8 (07 Sep 2018 06:13), Max: 37.1 (06 Sep 2018 21:30)  T(F): 98.2 (07 Sep 2018 06:13), Max: 98.7 (06 Sep 2018 21:30)  HR: 71 (07 Sep 2018 06:13) (71 - 86)  BP: 122/51 (07 Sep 2018 06:13) (122/51 - 190/72)  BP(mean): --  RR: 18 (07 Sep 2018 06:13) (18 - 18)  SpO2: 95% (07 Sep 2018 06:13) (94% - 98%)    Exam:  Gen: NAD  Motor: EHL/FHL/TA/Gastrocnemius intact  Sensory: SILT DP/SP/S/S/T nerve distributions  Vascular: 2+ Dorsalis Pedis pulse  Dressing: Clean, Dry, Intact    A/P: 72 year old female s/p R femur I&D  - Pain Control  - PT/OT, OOB  - ABx per ID  - Dressing changes

## 2018-09-07 NOTE — PROGRESS NOTE ADULT - PROBLEM SELECTOR PLAN 1
tolerated procedure well  pain meds as needed  Physical therapy with weight bearing as tolerated  Waling to the bathroom and standing up
tolerated procedure well  pain meds as needed  Physical therapy with weight bearing as tolerated  Waling to the bathroom and standing up
Said I missed a step . Denies LOC etc .
Patient with a loculated collection in right leg and will need I&D. discussed with ortho. may not be able to remove all of the hardware due to instability of the leg. May need to continue a prolonged course of abx and then reevaluate at a later time  will discussed the need for possible chronic suppressive therapy
Patient with a loculated collection in right leg and will need I&D. discussed with ortho. may not be able to remove all of the hardware due to instability of the leg. May need to continue a prolonged course of abx and then reevaluate at a later time  will discussed the need for possible chronic suppressive therapy
Patient with a [id number] collection in right leg and will need I&D. discussed with ortho. may not be able to remove all of the hardware due to instability of the leg. May need to continue a prolongferd course of abx and then reevaluate at a later time  will discussed the need for possible chronic suppressive therapy
continue to follow hip   pain meds as needed   Patient is somewhat pain med seeking  ortho following
continue to follow hip   pain meds as needed   Patient is somewhat pain med seeking  ortho following  will readdress need for IR aspiration fo further diagnosis  with complex fluid collection on MRI may need I&D
tolerated procedure well  pain meds as needed  Physical therapy with weight bearing as tolerated
tolerated procedure well  pain meds as needed  Physical therapy with weight bearing as tolerated
tolerated procedure well  pain meds as needed  Physical therapy with weight bearing as tolerated  Waling to the bathroom and standing up
tolerated procedure well  pain meds as needed  Physical therapy with weight bearing as tolerated  Waling to the bathroom and standing up
tolerated procedure well  pain meds as needed  Physical therapy with weight bearing as tolerated  continue to ambulate as tolerated  DC planning to rehab
continue to follow hip   pain meds as needed   Patient is somewhat pain med seeking  ortho following
continue to follow hip   pain meds as needed   Patient is somewhat pain med seeking  ortho following
Patient with a loculated collection in right leg S/P  I&D. discussed with ortho. did not remove all of the hardware due to instability of the leg. will need continue bx with possible chronic suppressive therapy

## 2018-09-07 NOTE — PROGRESS NOTE ADULT - PROVIDER SPECIALTY LIST ADULT
Infectious Disease
Internal Medicine
Orthopedics
Infectious Disease
Internal Medicine

## 2018-09-07 NOTE — PROGRESS NOTE ADULT - PROBLEM SELECTOR PLAN 5
Start miralax daily
Start miralax daily
BP meds with parameters
Start miralax daily

## 2018-09-07 NOTE — PROGRESS NOTE ADULT - PROBLEM SELECTOR PROBLEM 3
HTN (hypertension)
COPD (chronic obstructive pulmonary disease)
HTN (hypertension)

## 2018-09-07 NOTE — PROGRESS NOTE ADULT - ATTENDING COMMENTS
For loculated fluid drainage and possible hardware revision in the AM. The patient is medically stable, medically optimized and has no medical contraindication to surgery tomorrow as required. Exam time 40 minutes including > than 50 % for bedside discussion and counseling. Cleared by orthopedics for discharge to rehabilitation.  Full instructions provided regarding diagnosis, treatment, discharge medications, diet and follow up care on transfer sheet. Medically stable and for discharge to rehabilitation today as planned.

## 2018-09-07 NOTE — PROGRESS NOTE ADULT - PROBLEM SELECTOR PROBLEM 5
Chronic idiopathic constipation
Chronic idiopathic constipation
HTN (hypertension)
Chronic idiopathic constipation

## 2018-09-07 NOTE — PROGRESS NOTE ADULT - PROBLEM SELECTOR PLAN 2
continue nebs and supplemental O2 as needed
continue nebs and supplemental O2 as needed  lungs have been clear
ortho saw pt in ED. X rays noted . PT recommending MADELEINE.
continue nebs and supplemental O2 as needed
continue nebs and supplemental O2 as needed  lungs have been clear
continue nebs and supplemental O2 as needed  lungs have been clear  currently off O2
continue nebs and supplemental O2 as needed  lungs have been clear  currently off O2
continue nebs and supplemental O2 as needed

## 2018-09-07 NOTE — PROGRESS NOTE ADULT - PROBLEM SELECTOR PROBLEM 1
Fall from standing, initial encounter
Hip pain, acute, right

## 2018-09-07 NOTE — PROGRESS NOTE ADULT - SUBJECTIVE AND OBJECTIVE BOX
71 yo F, w/ PMH of emphysema, CAD, HTN, revision USAMA with ORIF for periprosthetic R femur fracture on 6/30/18 by Dr. Be, discharged on 7/3/18 to [facility] rehab, readmitted from 07/16/07/24/18 w/ worsening R hip pain and decreased ability to ambulate, now s/p right femur vancouver B1 periprosthetic fracture ORIF by Dr. Cornell on 07/19/18, BIBEMS s/p mechanical fall on stairs during the night c/o R hip and R leg pain, redness, swelling, stiffness. Patient denies other complaints. Denies headache, neck stiffness, fever/chills, vision change, chest pain, shortness of breath, difficulty breathing, palpitations, weakness, dizziness, nausea, vomiting, diarrhea, syncope.   From prior, note, patient had initial right hip fracture surgery at [facility] with a hip pin placement in 2015.  She developed avascular necrosis, necessitating a right total hip replacement at [facility] in 2017.  She was well for one year and had an accidental fall taking out her garbage June, 2018. She was then diagnosed with a periprosthetic fracture. Repair consisted of lengthening the right total hip replacement and then stabilization with the distal femur.  At rehabilitation, she had a nontraumatic separation of this distal stabilization which required a periprosthetic revision. Patients/p revision of right hip surgery as of 07/19/18. Patient present with fever and unclrear etiology.  She presented to the ED on 8/20 with R hip and thigh pain, swelling, stiffness, and redness in the setting of a mechanical fall on the stairs.  Then in the hospital spiked to 102.9, blood cx with MRSA  LE CT with lucency at the acetabular screw and prox femur, fluid collection in lateral soft tissue of thigh, an other fluid collection next to vastus intermedius.  Will need biopsy or drainage of the fluid collection. Cultures now show MRSA in Blood  and Urine. Needed procedure to deterine if there is  MRSA IN THE FLUID COLLECTION IN THE LEG. Patient seen today s/p washout of the right leg. Patient found to have anemia probably due to acute blood loss post op. Complais of inability to evacuate stool and needs to pull the feces Out by hand.  Will add Miralax and increase po fluids and  give probiotic. Patient doing well with physical therapy  MEDICATIONS  (STANDING):  aspirin enteric coated 81 milliGRAM(s) Oral daily  buDESOnide   0.5 milliGRAM(s) Respule 0.5 milliGRAM(s) Inhalation two times a day  chlorhexidine 4% Liquid 1 Application(s) Topical <User Schedule>  enoxaparin Injectable 40 milliGRAM(s) SubCutaneous daily  folic acid 1 milliGRAM(s) Oral daily  gabapentin 300 milliGRAM(s) Oral three times a day  lactated ringers. 1000 milliLiter(s) (75 mL/Hr) IV Continuous <Continuous>  lactobacillus acidophilus 1 Tablet(s) Oral with lunch  multivitamin/minerals 1 Tablet(s) Oral daily  nicotine - 21 mG/24Hr(s) Patch 1 patch Transdermal daily  oxyCODONE  ER Tablet 20 milliGRAM(s) Oral every 12 hours  pantoprazole    Tablet 40 milliGRAM(s) Oral before breakfast  polyethylene glycol 3350 17 Gram(s) Oral daily  polyethylene glycol 3350 17 Gram(s) Oral daily  QUEtiapine 150 milliGRAM(s) Oral at bedtime  rifampin IVPB      rifampin IVPB 600 milliGRAM(s) IV Intermittent every 24 hours  senna 2 Tablet(s) Oral at bedtime  thiamine 100 milliGRAM(s) Oral daily  traMADol 50 milliGRAM(s) Oral every 6 hours  vancomycin  IVPB 1000 milliGRAM(s) IV Intermittent every 12 hours    MEDICATIONS  (PRN):  acetaminophen   Tablet 650 milliGRAM(s) Oral every 6 hours PRN For Temp greater than 38.5 C (101.3 F)  acetaminophen   Tablet. 650 milliGRAM(s) Oral every 6 hours PRN Mild Pain (1 - 3)  HYDROmorphone   Tablet 4 milliGRAM(s) Oral every 3 hours PRN Moderate Pain (4 - 6)  HYDROmorphone   Tablet 8 milliGRAM(s) Oral every 3 hours PRN Severe Pain (7 - 10)  QUEtiapine 50 milliGRAM(s) Oral every 6 hours PRN anxiety/insomnia          VITALS:   T(C): 37.1 (09-06-18 @ 21:30), Max: 37.1 (09-06-18 @ 21:30)  HR: 81 (09-06-18 @ 21:30) (74 - 81)  BP: 140/68 (09-06-18 @ 21:34) (130/86 - 190/72)  RR: 18 (09-06-18 @ 21:30) (18 - 18)  SpO2: 98% (09-06-18 @ 21:30) (94% - 98%)  Wt(kg): --    HYSICAL EXAM:  GENERAL: NAD, well nourished and conversant  HEAD:  Atraumatic  EYES: EOM, PERRLA, conjunctiva pink and sclera white  ENT: No tonsillar erythema, exudates, or enlargement, moist mucous membranes, good dentition, no lesions  NECK: Supple, No JVD, normal thyroid, carotids with normal upstrokes and no bruits  CHEST/LUNG: soft rales at the left base  HEART: Regular rate and rhythm, No murmurs, rubs, or gallops  ABDOMEN: Soft, nondistended, no masses, guarding, tenderness or rebound, bowel sounds present  EXTREMITIES:  2+ Peripheral Pulses, No clubbing, cyanosis, or edema. (+) right periprosthetic fracture site edema  LYMPH: No lymphadenopathy noted  SKIN: No rashes or lesions  NERVOUS SYSTEM:  Alert & Oriented X3, normal cognitive function. Motor Strength 5/5 right upper and right lower.  5/5 left upper and left lower extremities, DTRs 2+ intact and symmetric    LABS:        CBC Full  -  ( 05 Sep 2018 08:00 )  WBC Count : 6.04 K/uL  Hemoglobin : 8.7 g/dL  Hematocrit : 28.7 %  Platelet Count - Automated : 377 K/uL  Mean Cell Volume : 89.7 fl  Mean Cell Hemoglobin : 27.2 pg  Mean Cell Hemoglobin Concentration : 30.3 gm/dL  Auto Neutrophil # : x  Auto Lymphocyte # : x  Auto Monocyte # : x  Auto Eosinophil # : x  Auto Basophil # : x  Auto Neutrophil % : x  Auto Lymphocyte % : x  Auto Monocyte % : x  Auto Eosinophil % : x  Auto Basophil % : x    09-06    140  |  102  |  27<H>  ----------------------------<  122<H>  3.9   |  26  |  0.98    Ca    8.3<L>      06 Sep 2018 04:30            CAPILLARY BLOOD GLUCOSE          RADIOLOGY & ADDITIONAL TESTS: 71 yo F, w/ PMH of emphysema, CAD, HTN, revision USAMA with ORIF for periprosthetic R femur fracture on 6/30/18 by Dr. Be, discharged on 7/3/18 to [facility] rehab, readmitted from 07/16/07/24/18 w/ worsening R hip pain and decreased ability to ambulate, now s/p right femur vancouver B1 periprosthetic fracture ORIF by Dr. Cornell on 07/19/18, BIBEMS s/p mechanical fall on stairs during the night c/o R hip and R leg pain, redness, swelling, stiffness. Patient denies other complaints. Denies headache, neck stiffness, fever/chills, vision change, chest pain, shortness of breath, difficulty breathing, palpitations, weakness, dizziness, nausea, vomiting, diarrhea, syncope.   From prior, note, patient had initial right hip fracture surgery at [facility] with a hip pin placement in 2015.  She developed avascular necrosis, necessitating a right total hip replacement at [facility] in 2017.  She was well for one year and had an accidental fall taking out her garbage June, 2018. She was then diagnosed with a periprosthetic fracture. Repair consisted of lengthening the right total hip replacement and then stabilization with the distal femur.  At rehabilitation, she had a nontraumatic separation of this distal stabilization which required a periprosthetic revision. Patients/p revision of right hip surgery as of 07/19/18. Patient present with fever and unclrear etiology.  She presented to the ED on 8/20 with R hip and thigh pain, swelling, stiffness, and redness in the setting of a mechanical fall on the stairs.  Then in the hospital spiked to 102.9, blood cx with MRSA  LE CT with lucency at the acetabular screw and prox femur, fluid collection in lateral soft tissue of thigh, an other fluid collection next to vastus intermedius.  Will need biopsy or drainage of the fluid collection. Cultures now show MRSA in Blood  and Urine. Needed procedure to deterine if there is  MRSA IN THE FLUID COLLECTION IN THE LEG. Patient seen today s/p washout of the right leg. Patient found to have anemia probably due to acute blood loss post op. Complais of inability to evacuate stool and needs to pull the feces Out by hand.  Will add Miralax and increase po fluids and  give probiotic. Patient doing well with physical therapy    MEDICATIONS  (STANDING):  aspirin enteric coated 81 milliGRAM(s) Oral daily  buDESOnide   0.5 milliGRAM(s) Respule 0.5 milliGRAM(s) Inhalation two times a day  chlorhexidine 4% Liquid 1 Application(s) Topical <User Schedule>  enoxaparin Injectable 40 milliGRAM(s) SubCutaneous daily  folic acid 1 milliGRAM(s) Oral daily  gabapentin 300 milliGRAM(s) Oral three times a day  lactated ringers. 1000 milliLiter(s) (75 mL/Hr) IV Continuous <Continuous>  lactobacillus acidophilus 1 Tablet(s) Oral with lunch  multivitamin/minerals 1 Tablet(s) Oral daily  nicotine - 21 mG/24Hr(s) Patch 1 patch Transdermal daily  oxyCODONE  ER Tablet 20 milliGRAM(s) Oral every 12 hours  pantoprazole    Tablet 40 milliGRAM(s) Oral before breakfast  polyethylene glycol 3350 17 Gram(s) Oral daily  polyethylene glycol 3350 17 Gram(s) Oral daily  QUEtiapine 150 milliGRAM(s) Oral at bedtime  rifampin IVPB      rifampin IVPB 600 milliGRAM(s) IV Intermittent every 24 hours  senna 2 Tablet(s) Oral at bedtime  thiamine 100 milliGRAM(s) Oral daily  traMADol 50 milliGRAM(s) Oral every 6 hours  vancomycin  IVPB 1000 milliGRAM(s) IV Intermittent every 12 hours    MEDICATIONS  (PRN):  acetaminophen   Tablet 650 milliGRAM(s) Oral every 6 hours PRN For Temp greater than 38.5 C (101.3 F)  acetaminophen   Tablet. 650 milliGRAM(s) Oral every 6 hours PRN Mild Pain (1 - 3)  HYDROmorphone   Tablet 4 milliGRAM(s) Oral every 3 hours PRN Moderate Pain (4 - 6)  HYDROmorphone   Tablet 8 milliGRAM(s) Oral every 3 hours PRN Severe Pain (7 - 10)  QUEtiapine 50 milliGRAM(s) Oral every 6 hours PRN anxiety/insomnia      Vital Signs Last 24 Hrs  T(C): 36.9 (07 Sep 2018 16:26), Max: 37.1 (06 Sep 2018 21:30)  T(F): 98.5 (07 Sep 2018 16:26), Max: 98.7 (06 Sep 2018 21:30)  HR: 79 (07 Sep 2018 16:26) (71 - 86)  BP: 144/72 (07 Sep 2018 16:26) (122/51 - 190/72)  BP(mean): --  RR: 18 (07 Sep 2018 16:26) (18 - 18)  SpO2: 97% (07 Sep 2018 16:26) (94% - 98%)        HYSICAL EXAM:  GENERAL: NAD, well nourished and conversant  HEAD:  Atraumatic  EYES: EOM, PERRLA, conjunctiva pink and sclera white  ENT: No tonsillar erythema, exudates, or enlargement, moist mucous membranes, good dentition, no lesions  NECK: Supple, No JVD, normal thyroid, carotids with normal upstrokes and no bruits  CHEST/LUNG: soft rales at the left base  HEART: Regular rate and rhythm, No murmurs, rubs, or gallops  ABDOMEN: Soft, nondistended, no masses, guarding, tenderness or rebound, bowel sounds present  EXTREMITIES:  2+ Peripheral Pulses, No clubbing, cyanosis, or edema. (+) right periprosthetic fracture site edema  LYMPH: No lymphadenopathy noted  SKIN: No rashes or lesions  NERVOUS SYSTEM:  Alert & Oriented X3, normal cognitive function. Motor Strength 5/5 right upper and right lower.  5/5 left upper and left lower extremities, DTRs 2+ intact and symmetric    LABS:      09-06    140  |  102  |  27<H>  ----------------------------<  122<H>  3.9   |  26  |  0.98    Ca    8.3<L>      06 Sep 2018 04:30 71 yo F, w/ PMH of emphysema, CAD, HTN, revision USAMA with ORIF for periprosthetic R femur fracture on 6/30/18 by Dr. Be, discharged on 7/3/18 to [facility] rehab, readmitted from 07/16/07/24/18 w/ worsening R hip pain and decreased ability to ambulate, now s/p right femur vancouver B1 periprosthetic fracture ORIF by Dr. Cornell on 07/19/18, BIBEMS s/p mechanical fall on stairs during the night c/o R hip and R leg pain, redness, swelling, stiffness. Patient denies other complaints. Denies headache, neck stiffness, fever/chills, vision change, chest pain, shortness of breath, difficulty breathing, palpitations, weakness, dizziness, nausea, vomiting, diarrhea, syncope.   From prior, note, patient had initial right hip fracture surgery at [facility] with a hip pin placement in 2015.  She developed avascular necrosis, necessitating a right total hip replacement at [facility] in 2017.  She was well for one year and had an accidental fall taking out her garbage June, 2018. She was then diagnosed with a periprosthetic fracture. Repair consisted of lengthening the right total hip replacement and then stabilization with the distal femur.  At rehabilitation, she had a nontraumatic separation of this distal stabilization which required a periprosthetic revision. Patients/p revision of right hip surgery as of 07/19/18. Patient present with fever and unclrear etiology.  She presented to the ED on 8/20 with R hip and thigh pain, swelling, stiffness, and redness in the setting of a mechanical fall on the stairs.  Then in the hospital spiked to 102.9, blood cx with MRSA  LE CT with lucency at the acetabular screw and prox femur, fluid collection in lateral soft tissue of thigh, an other fluid collection next to vastus intermedius.  Will need biopsy or drainage of the fluid collection. Cultures now show MRSA in Blood  and Urine. Needed procedure to deterine if there is  MRSA IN THE FLUID COLLECTION IN THE LEG. Patient seen today s/p washout of the right leg. Patient found to have anemia probably due to acute blood loss post op. Complains of inability to evacuate stool and needs to pull the feces Out by hand.  Will add Miralax and increase po fluids and  give probiotic. Patient doing well with physical therapy. Wound drainage continues with dressing changes  twice a day.  She continues IV vancomycin  for MRSA  cellulitis/osteomyelitis.  The patient is fully ambulatory and orthopedically has been cleared  for transfer to rehabilitation    MEDICATIONS  (STANDING):  aspirin enteric coated 81 milliGRAM(s) Oral daily  buDESOnide   0.5 milliGRAM(s) Respule 0.5 milliGRAM(s) Inhalation two times a day  chlorhexidine 4% Liquid 1 Application(s) Topical <User Schedule>  enoxaparin Injectable 40 milliGRAM(s) SubCutaneous daily  folic acid 1 milliGRAM(s) Oral daily  gabapentin 300 milliGRAM(s) Oral three times a day  lactated ringers. 1000 milliLiter(s) (75 mL/Hr) IV Continuous <Continuous>  lactobacillus acidophilus 1 Tablet(s) Oral with lunch  multivitamin/minerals 1 Tablet(s) Oral daily  nicotine - 21 mG/24Hr(s) Patch 1 patch Transdermal daily  oxyCODONE  ER Tablet 20 milliGRAM(s) Oral every 12 hours  pantoprazole    Tablet 40 milliGRAM(s) Oral before breakfast  polyethylene glycol 3350 17 Gram(s) Oral daily  polyethylene glycol 3350 17 Gram(s) Oral daily  QUEtiapine 150 milliGRAM(s) Oral at bedtime  rifampin IVPB      rifampin IVPB 600 milliGRAM(s) IV Intermittent every 24 hours  senna 2 Tablet(s) Oral at bedtime  thiamine 100 milliGRAM(s) Oral daily  traMADol 50 milliGRAM(s) Oral every 6 hours  vancomycin  IVPB 1000 milliGRAM(s) IV Intermittent every 12 hours    MEDICATIONS  (PRN):  acetaminophen   Tablet 650 milliGRAM(s) Oral every 6 hours PRN For Temp greater than 38.5 C (101.3 F)  acetaminophen   Tablet. 650 milliGRAM(s) Oral every 6 hours PRN Mild Pain (1 - 3)  HYDROmorphone   Tablet 4 milliGRAM(s) Oral every 3 hours PRN Moderate Pain (4 - 6)  HYDROmorphone   Tablet 8 milliGRAM(s) Oral every 3 hours PRN Severe Pain (7 - 10)  QUEtiapine 50 milliGRAM(s) Oral every 6 hours PRN anxiety/insomnia      Vital Signs Last 24 Hrs  T(C): 36.9 (07 Sep 2018 16:26), Max: 37.1 (06 Sep 2018 21:30)  T(F): 98.5 (07 Sep 2018 16:26), Max: 98.7 (06 Sep 2018 21:30)  HR: 79 (07 Sep 2018 16:26) (71 - 86)  BP: 144/72 (07 Sep 2018 16:26) (122/51 - 190/72)  BP(mean): --  RR: 18 (07 Sep 2018 16:26) (18 - 18)  SpO2: 97% (07 Sep 2018 16:26) (94% - 98%)        HYSICAL EXAM:  GENERAL: NAD, well nourished and conversant  HEAD:  Atraumatic  EYES: EOM, PERRLA, conjunctiva pink and sclera white  ENT: No tonsillar erythema, exudates, or enlargement, moist mucous membranes, good dentition, no lesions  NECK: Supple, No JVD, normal thyroid, carotids with normal upstrokes and no bruits  CHEST/LUNG: soft rales at the left base  HEART: Regular rate and rhythm, No murmurs, rubs, or gallops  ABDOMEN: Soft, nondistended, no masses, guarding, tenderness or rebound, bowel sounds present  EXTREMITIES:  2+ Peripheral Pulses, No clubbing, cyanosis, or edema. (+) right periprosthetic fracture site edema  LYMPH: No lymphadenopathy noted  SKIN: No rashes or lesions  NERVOUS SYSTEM:  Alert & Oriented X3, normal cognitive function. Motor Strength 5/5 right upper and right lower.  5/5 left upper and left lower extremities, DTRs 2+ intact and symmetric    LABS:      09-06    140  |  102  |  27<H>  ----------------------------<  122<H>  3.9   |  26  |  0.98    Ca    8.3<L>      06 Sep 2018 04:30

## 2018-09-07 NOTE — PROGRESS NOTE ADULT - PROBLEM SELECTOR PROBLEM 2
COPD (chronic obstructive pulmonary disease)
COPD (chronic obstructive pulmonary disease)
Hip pain, acute, right
COPD (chronic obstructive pulmonary disease)

## 2018-09-07 NOTE — PROGRESS NOTE ADULT - PROBLEM SELECTOR PLAN 3
continue to follow BP and adjust meds as needed
continue to follow BP and adjust meds as needed
will continue to monitor and adjust meds as needed
Advised to quit smoking . nebRx and Inhalers
continue to follow BP and adjust meds as needed
continue to follow BP and adjust meds as needed
discussed HTN meds with PA meds adjusted
will continue to monitor and adjust meds as needed
will continue to monitor and adjust meds as needed  has been well controlled
will continue to monitor and adjust meds as needed  has been well controlled

## 2018-09-27 ENCOUNTER — APPOINTMENT (OUTPATIENT)
Dept: ORTHOPEDIC SURGERY | Facility: CLINIC | Age: 73
End: 2018-09-27
Payer: MEDICARE

## 2018-09-27 PROCEDURE — 99024 POSTOP FOLLOW-UP VISIT: CPT

## 2018-09-27 PROCEDURE — 73502 X-RAY EXAM HIP UNI 2-3 VIEWS: CPT | Mod: RT

## 2018-09-29 LAB
CULTURE RESULTS: SIGNIFICANT CHANGE UP
SPECIMEN SOURCE: SIGNIFICANT CHANGE UP

## 2018-10-10 ENCOUNTER — APPOINTMENT (OUTPATIENT)
Dept: ORTHOPEDIC SURGERY | Facility: CLINIC | Age: 73
End: 2018-10-10
Payer: MEDICARE

## 2018-10-10 VITALS — HEIGHT: 62 IN | BODY MASS INDEX: 22.45 KG/M2 | WEIGHT: 122 LBS

## 2018-10-10 PROCEDURE — 99213 OFFICE O/P EST LOW 20 MIN: CPT

## 2018-10-25 PROCEDURE — 94640 AIRWAY INHALATION TREATMENT: CPT

## 2018-10-25 PROCEDURE — C1713: CPT

## 2018-10-25 PROCEDURE — P9016: CPT

## 2018-10-25 PROCEDURE — 82272 OCCULT BLD FECES 1-3 TESTS: CPT

## 2018-10-25 PROCEDURE — 83735 ASSAY OF MAGNESIUM: CPT

## 2018-10-25 PROCEDURE — 87086 URINE CULTURE/COLONY COUNT: CPT

## 2018-10-25 PROCEDURE — 36430 TRANSFUSION BLD/BLD COMPNT: CPT

## 2018-10-25 PROCEDURE — 97162 PT EVAL MOD COMPLEX 30 MIN: CPT

## 2018-10-25 PROCEDURE — 73502 X-RAY EXAM HIP UNI 2-3 VIEWS: CPT

## 2018-10-25 PROCEDURE — 87102 FUNGUS ISOLATION CULTURE: CPT

## 2018-10-25 PROCEDURE — 71045 X-RAY EXAM CHEST 1 VIEW: CPT

## 2018-10-25 PROCEDURE — 73721 MRI JNT OF LWR EXTRE W/O DYE: CPT

## 2018-10-25 PROCEDURE — 72190 X-RAY EXAM OF PELVIS: CPT

## 2018-10-25 PROCEDURE — 73718 MRI LOWER EXTREMITY W/O DYE: CPT

## 2018-10-25 PROCEDURE — 76882 US LMTD JT/FCL EVL NVASC XTR: CPT

## 2018-10-25 PROCEDURE — 76377 3D RENDER W/INTRP POSTPROCES: CPT

## 2018-10-25 PROCEDURE — 99285 EMERGENCY DEPT VISIT HI MDM: CPT | Mod: 25

## 2018-10-25 PROCEDURE — 80202 ASSAY OF VANCOMYCIN: CPT

## 2018-10-25 PROCEDURE — 80048 BASIC METABOLIC PNL TOTAL CA: CPT

## 2018-10-25 PROCEDURE — 36569 INSJ PICC 5 YR+ W/O IMAGING: CPT

## 2018-10-25 PROCEDURE — C1751: CPT

## 2018-10-25 PROCEDURE — 97530 THERAPEUTIC ACTIVITIES: CPT

## 2018-10-25 PROCEDURE — 80053 COMPREHEN METABOLIC PANEL: CPT

## 2018-10-25 PROCEDURE — 83690 ASSAY OF LIPASE: CPT

## 2018-10-25 PROCEDURE — 87150 DNA/RNA AMPLIFIED PROBE: CPT

## 2018-10-25 PROCEDURE — 97116 GAIT TRAINING THERAPY: CPT

## 2018-10-25 PROCEDURE — 51702 INSERT TEMP BLADDER CATH: CPT

## 2018-10-25 PROCEDURE — 70450 CT HEAD/BRAIN W/O DYE: CPT

## 2018-10-25 PROCEDURE — 83605 ASSAY OF LACTIC ACID: CPT

## 2018-10-25 PROCEDURE — 73552 X-RAY EXAM OF FEMUR 2/>: CPT

## 2018-10-25 PROCEDURE — 86901 BLOOD TYPING SEROLOGIC RH(D): CPT

## 2018-10-25 PROCEDURE — 96374 THER/PROPH/DIAG INJ IV PUSH: CPT | Mod: XU

## 2018-10-25 PROCEDURE — 85730 THROMBOPLASTIN TIME PARTIAL: CPT

## 2018-10-25 PROCEDURE — 87186 SC STD MICRODIL/AGAR DIL: CPT

## 2018-10-25 PROCEDURE — 82150 ASSAY OF AMYLASE: CPT

## 2018-10-25 PROCEDURE — 93971 EXTREMITY STUDY: CPT

## 2018-10-25 PROCEDURE — 85610 PROTHROMBIN TIME: CPT

## 2018-10-25 PROCEDURE — 81001 URINALYSIS AUTO W/SCOPE: CPT

## 2018-10-25 PROCEDURE — 73700 CT LOWER EXTREMITY W/O DYE: CPT

## 2018-10-25 PROCEDURE — 87040 BLOOD CULTURE FOR BACTERIA: CPT

## 2018-10-25 PROCEDURE — 87070 CULTURE OTHR SPECIMN AEROBIC: CPT

## 2018-10-25 PROCEDURE — 86850 RBC ANTIBODY SCREEN: CPT

## 2018-10-25 PROCEDURE — 86923 COMPATIBILITY TEST ELECTRIC: CPT

## 2018-10-25 PROCEDURE — 85027 COMPLETE CBC AUTOMATED: CPT

## 2018-10-25 PROCEDURE — 86900 BLOOD TYPING SEROLOGIC ABO: CPT

## 2018-11-05 ENCOUNTER — APPOINTMENT (OUTPATIENT)
Dept: INFECTIOUS DISEASE | Facility: CLINIC | Age: 73
End: 2018-11-05

## 2018-11-13 ENCOUNTER — APPOINTMENT (OUTPATIENT)
Dept: WOUND CARE | Facility: CLINIC | Age: 73
End: 2018-11-13

## 2018-12-12 ENCOUNTER — APPOINTMENT (OUTPATIENT)
Dept: INFECTIOUS DISEASE | Facility: CLINIC | Age: 73
End: 2018-12-12

## 2018-12-12 ENCOUNTER — INPATIENT (INPATIENT)
Facility: HOSPITAL | Age: 73
LOS: 7 days | Discharge: HOME | DRG: 560 | End: 2018-12-20
Attending: INTERNAL MEDICINE | Admitting: INTERNAL MEDICINE
Payer: MEDICARE

## 2018-12-12 VITALS
WEIGHT: 134.92 LBS | RESPIRATION RATE: 19 BRPM | OXYGEN SATURATION: 96 % | HEART RATE: 74 BPM | DIASTOLIC BLOOD PRESSURE: 54 MMHG | SYSTOLIC BLOOD PRESSURE: 110 MMHG

## 2018-12-12 DIAGNOSIS — Z98.89 OTHER SPECIFIED POSTPROCEDURAL STATES: Chronic | ICD-10-CM

## 2018-12-12 DIAGNOSIS — T84.7XXS INFECTION AND INFLAMMATORY REACTION DUE TO OTHER INTERNAL ORTHOPEDIC PROSTHETIC DEVICES, IMPLANTS AND GRAFTS, SEQUELA: ICD-10-CM

## 2018-12-12 DIAGNOSIS — Z96.641 PRESENCE OF RIGHT ARTIFICIAL HIP JOINT: Chronic | ICD-10-CM

## 2018-12-12 LAB
ALBUMIN SERPL ELPH-MCNC: 3 G/DL — LOW (ref 3.3–5)
ALP SERPL-CCNC: 120 U/L — SIGNIFICANT CHANGE UP (ref 40–120)
ALT FLD-CCNC: 18 U/L — SIGNIFICANT CHANGE UP (ref 10–45)
ANION GAP SERPL CALC-SCNC: 16 MMOL/L — SIGNIFICANT CHANGE UP (ref 5–17)
AST SERPL-CCNC: 39 U/L — SIGNIFICANT CHANGE UP (ref 10–40)
BASOPHILS # BLD AUTO: 0 K/UL — SIGNIFICANT CHANGE UP (ref 0–0.2)
BASOPHILS NFR BLD AUTO: 0.3 % — SIGNIFICANT CHANGE UP (ref 0–2)
BILIRUB SERPL-MCNC: 0.1 MG/DL — LOW (ref 0.2–1.2)
BUN SERPL-MCNC: 35 MG/DL — HIGH (ref 7–23)
CALCIUM SERPL-MCNC: 8.8 MG/DL — SIGNIFICANT CHANGE UP (ref 8.4–10.5)
CHLORIDE SERPL-SCNC: 103 MMOL/L — SIGNIFICANT CHANGE UP (ref 96–108)
CO2 SERPL-SCNC: 16 MMOL/L — LOW (ref 22–31)
CREAT SERPL-MCNC: 1.23 MG/DL — SIGNIFICANT CHANGE UP (ref 0.5–1.3)
CRP SERPL-MCNC: 24.4 MG/DL — HIGH (ref 0–0.4)
EOSINOPHIL # BLD AUTO: 0.3 K/UL — SIGNIFICANT CHANGE UP (ref 0–0.5)
EOSINOPHIL NFR BLD AUTO: 3.7 % — SIGNIFICANT CHANGE UP (ref 0–6)
ERYTHROCYTE [SEDIMENTATION RATE] IN BLOOD: 105 MM/HR — HIGH (ref 0–20)
ETHANOL SERPL-MCNC: SIGNIFICANT CHANGE UP MG/DL (ref 0–10)
GAS PNL BLDV: SIGNIFICANT CHANGE UP
GLUCOSE SERPL-MCNC: 127 MG/DL — HIGH (ref 70–99)
HCT VFR BLD CALC: 31.7 % — LOW (ref 34.5–45)
HGB BLD-MCNC: 10.5 G/DL — LOW (ref 11.5–15.5)
LYMPHOCYTES # BLD AUTO: 0.8 K/UL — LOW (ref 1–3.3)
LYMPHOCYTES # BLD AUTO: 10.6 % — LOW (ref 13–44)
MCHC RBC-ENTMCNC: 28.8 PG — SIGNIFICANT CHANGE UP (ref 27–34)
MCHC RBC-ENTMCNC: 33.1 GM/DL — SIGNIFICANT CHANGE UP (ref 32–36)
MCV RBC AUTO: 87 FL — SIGNIFICANT CHANGE UP (ref 80–100)
MONOCYTES # BLD AUTO: 0.6 K/UL — SIGNIFICANT CHANGE UP (ref 0–0.9)
MONOCYTES NFR BLD AUTO: 7.9 % — SIGNIFICANT CHANGE UP (ref 2–14)
NEUTROPHILS # BLD AUTO: 5.6 K/UL — SIGNIFICANT CHANGE UP (ref 1.8–7.4)
NEUTROPHILS NFR BLD AUTO: 77.6 % — HIGH (ref 43–77)
PLATELET # BLD AUTO: 454 K/UL — HIGH (ref 150–400)
POTASSIUM SERPL-MCNC: 5.8 MMOL/L — HIGH (ref 3.5–5.3)
POTASSIUM SERPL-SCNC: 5.8 MMOL/L — HIGH (ref 3.5–5.3)
PROT SERPL-MCNC: 7.6 G/DL — SIGNIFICANT CHANGE UP (ref 6–8.3)
RBC # BLD: 3.64 M/UL — LOW (ref 3.8–5.2)
RBC # FLD: 16.4 % — HIGH (ref 10.3–14.5)
SODIUM SERPL-SCNC: 135 MMOL/L — SIGNIFICANT CHANGE UP (ref 135–145)
WBC # BLD: 7.2 K/UL — SIGNIFICANT CHANGE UP (ref 3.8–10.5)
WBC # FLD AUTO: 7.2 K/UL — SIGNIFICANT CHANGE UP (ref 3.8–10.5)

## 2018-12-12 PROCEDURE — 36000 PLACE NEEDLE IN VEIN: CPT

## 2018-12-12 PROCEDURE — 99285 EMERGENCY DEPT VISIT HI MDM: CPT | Mod: 25

## 2018-12-12 PROCEDURE — 71045 X-RAY EXAM CHEST 1 VIEW: CPT | Mod: 26

## 2018-12-12 PROCEDURE — 76937 US GUIDE VASCULAR ACCESS: CPT | Mod: 26

## 2018-12-12 PROCEDURE — 73552 X-RAY EXAM OF FEMUR 2/>: CPT | Mod: 26,RT

## 2018-12-12 PROCEDURE — 72193 CT PELVIS W/DYE: CPT | Mod: 26

## 2018-12-12 PROCEDURE — 73502 X-RAY EXAM HIP UNI 2-3 VIEWS: CPT | Mod: 26,RT

## 2018-12-12 PROCEDURE — 93010 ELECTROCARDIOGRAM REPORT: CPT

## 2018-12-12 RX ORDER — NICOTINE POLACRILEX 2 MG
1 GUM BUCCAL DAILY
Qty: 0 | Refills: 0 | Status: DISCONTINUED | OUTPATIENT
Start: 2018-12-12 | End: 2018-12-20

## 2018-12-12 RX ORDER — VANCOMYCIN HCL 1 G
1000 VIAL (EA) INTRAVENOUS EVERY 24 HOURS
Qty: 0 | Refills: 0 | Status: DISCONTINUED | OUTPATIENT
Start: 2018-12-12 | End: 2018-12-20

## 2018-12-12 RX ORDER — ACETAMINOPHEN 500 MG
1000 TABLET ORAL ONCE
Qty: 0 | Refills: 0 | Status: COMPLETED | OUTPATIENT
Start: 2018-12-12 | End: 2018-12-12

## 2018-12-12 RX ORDER — PANTOPRAZOLE SODIUM 20 MG/1
40 TABLET, DELAYED RELEASE ORAL
Qty: 0 | Refills: 0 | Status: DISCONTINUED | OUTPATIENT
Start: 2018-12-12 | End: 2018-12-20

## 2018-12-12 RX ORDER — HEPARIN SODIUM 5000 [USP'U]/ML
5000 INJECTION INTRAVENOUS; SUBCUTANEOUS EVERY 12 HOURS
Qty: 0 | Refills: 0 | Status: DISCONTINUED | OUTPATIENT
Start: 2018-12-12 | End: 2018-12-20

## 2018-12-12 RX ORDER — POLYETHYLENE GLYCOL 3350 17 G/17G
17 POWDER, FOR SOLUTION ORAL DAILY
Qty: 0 | Refills: 0 | Status: DISCONTINUED | OUTPATIENT
Start: 2018-12-12 | End: 2018-12-20

## 2018-12-12 RX ORDER — GABAPENTIN 400 MG/1
300 CAPSULE ORAL THREE TIMES A DAY
Qty: 0 | Refills: 0 | Status: DISCONTINUED | OUTPATIENT
Start: 2018-12-12 | End: 2018-12-20

## 2018-12-12 RX ORDER — SODIUM CHLORIDE 9 MG/ML
1000 INJECTION INTRAMUSCULAR; INTRAVENOUS; SUBCUTANEOUS ONCE
Qty: 0 | Refills: 0 | Status: COMPLETED | OUTPATIENT
Start: 2018-12-12 | End: 2018-12-12

## 2018-12-12 RX ORDER — SENNA PLUS 8.6 MG/1
2 TABLET ORAL AT BEDTIME
Qty: 0 | Refills: 0 | Status: DISCONTINUED | OUTPATIENT
Start: 2018-12-12 | End: 2018-12-20

## 2018-12-12 RX ORDER — MORPHINE SULFATE 50 MG/1
2 CAPSULE, EXTENDED RELEASE ORAL ONCE
Qty: 0 | Refills: 0 | Status: DISCONTINUED | OUTPATIENT
Start: 2018-12-12 | End: 2018-12-12

## 2018-12-12 RX ORDER — HYDROMORPHONE HYDROCHLORIDE 2 MG/ML
0.5 INJECTION INTRAMUSCULAR; INTRAVENOUS; SUBCUTANEOUS EVERY 4 HOURS
Qty: 0 | Refills: 0 | Status: DISCONTINUED | OUTPATIENT
Start: 2018-12-12 | End: 2018-12-13

## 2018-12-12 RX ORDER — QUETIAPINE FUMARATE 200 MG/1
150 TABLET, FILM COATED ORAL AT BEDTIME
Qty: 0 | Refills: 0 | Status: DISCONTINUED | OUTPATIENT
Start: 2018-12-12 | End: 2018-12-20

## 2018-12-12 RX ORDER — OXYCODONE HYDROCHLORIDE 5 MG/1
10 TABLET ORAL EVERY 12 HOURS
Qty: 0 | Refills: 0 | Status: DISCONTINUED | OUTPATIENT
Start: 2018-12-12 | End: 2018-12-19

## 2018-12-12 RX ORDER — ASPIRIN/CALCIUM CARB/MAGNESIUM 324 MG
81 TABLET ORAL DAILY
Qty: 0 | Refills: 0 | Status: DISCONTINUED | OUTPATIENT
Start: 2018-12-12 | End: 2018-12-20

## 2018-12-12 RX ORDER — SODIUM CHLORIDE 9 MG/ML
1000 INJECTION INTRAMUSCULAR; INTRAVENOUS; SUBCUTANEOUS
Qty: 0 | Refills: 0 | Status: DISCONTINUED | OUTPATIENT
Start: 2018-12-12 | End: 2018-12-20

## 2018-12-12 RX ORDER — LOSARTAN POTASSIUM 100 MG/1
50 TABLET, FILM COATED ORAL DAILY
Qty: 0 | Refills: 0 | Status: DISCONTINUED | OUTPATIENT
Start: 2018-12-12 | End: 2018-12-20

## 2018-12-12 RX ORDER — BUDESONIDE, MICRONIZED 100 %
0.5 POWDER (GRAM) MISCELLANEOUS
Qty: 0 | Refills: 0 | Status: DISCONTINUED | OUTPATIENT
Start: 2018-12-12 | End: 2018-12-20

## 2018-12-12 RX ORDER — LISINOPRIL 2.5 MG/1
10 TABLET ORAL DAILY
Qty: 0 | Refills: 0 | Status: DISCONTINUED | OUTPATIENT
Start: 2018-12-12 | End: 2018-12-20

## 2018-12-12 RX ORDER — HYDRALAZINE HCL 50 MG
50 TABLET ORAL EVERY 8 HOURS
Qty: 0 | Refills: 0 | Status: DISCONTINUED | OUTPATIENT
Start: 2018-12-12 | End: 2018-12-20

## 2018-12-12 RX ORDER — OXYCODONE HYDROCHLORIDE 5 MG/1
20 TABLET ORAL EVERY 12 HOURS
Qty: 0 | Refills: 0 | Status: DISCONTINUED | OUTPATIENT
Start: 2018-12-12 | End: 2018-12-12

## 2018-12-12 RX ORDER — AMLODIPINE BESYLATE 2.5 MG/1
10 TABLET ORAL DAILY
Qty: 0 | Refills: 0 | Status: DISCONTINUED | OUTPATIENT
Start: 2018-12-12 | End: 2018-12-20

## 2018-12-12 RX ADMIN — GABAPENTIN 300 MILLIGRAM(S): 400 CAPSULE ORAL at 21:24

## 2018-12-12 RX ADMIN — HEPARIN SODIUM 5000 UNIT(S): 5000 INJECTION INTRAVENOUS; SUBCUTANEOUS at 21:19

## 2018-12-12 RX ADMIN — MORPHINE SULFATE 2 MILLIGRAM(S): 50 CAPSULE, EXTENDED RELEASE ORAL at 16:36

## 2018-12-12 RX ADMIN — MORPHINE SULFATE 2 MILLIGRAM(S): 50 CAPSULE, EXTENDED RELEASE ORAL at 19:39

## 2018-12-12 RX ADMIN — SENNA PLUS 2 TABLET(S): 8.6 TABLET ORAL at 21:38

## 2018-12-12 RX ADMIN — Medication 1 PATCH: at 21:43

## 2018-12-12 RX ADMIN — HYDROMORPHONE HYDROCHLORIDE 0.5 MILLIGRAM(S): 2 INJECTION INTRAMUSCULAR; INTRAVENOUS; SUBCUTANEOUS at 19:33

## 2018-12-12 RX ADMIN — QUETIAPINE FUMARATE 150 MILLIGRAM(S): 200 TABLET, FILM COATED ORAL at 21:37

## 2018-12-12 RX ADMIN — HYDROMORPHONE HYDROCHLORIDE 0.5 MILLIGRAM(S): 2 INJECTION INTRAMUSCULAR; INTRAVENOUS; SUBCUTANEOUS at 23:18

## 2018-12-12 RX ADMIN — SODIUM CHLORIDE 2000 MILLILITER(S): 9 INJECTION INTRAMUSCULAR; INTRAVENOUS; SUBCUTANEOUS at 16:35

## 2018-12-12 RX ADMIN — Medication 250 MILLIGRAM(S): at 19:33

## 2018-12-12 RX ADMIN — Medication 1000 MILLIGRAM(S): at 19:39

## 2018-12-12 RX ADMIN — Medication 400 MILLIGRAM(S): at 16:36

## 2018-12-12 RX ADMIN — SODIUM CHLORIDE 70 MILLILITER(S): 9 INJECTION INTRAMUSCULAR; INTRAVENOUS; SUBCUTANEOUS at 19:33

## 2018-12-12 RX ADMIN — Medication 50 MILLIGRAM(S): at 21:25

## 2018-12-12 RX ADMIN — HYDROMORPHONE HYDROCHLORIDE 0.5 MILLIGRAM(S): 2 INJECTION INTRAMUSCULAR; INTRAVENOUS; SUBCUTANEOUS at 22:47

## 2018-12-12 NOTE — CONSULT NOTE ADULT - SUBJECTIVE AND OBJECTIVE BOX
73yFemale c/o R leg pain worsening over past few days now with difficulty ambulating.  Patient had right hip fracture eventually converted to a total hip arthroplasty, complicated by a periprosthetic fracture in July 2018.  She underwent an ORIF for a vancouver B1 periprosthetic fracture on 7/19/18, subsequently readmitted for infection in that R thigh, and underwent I&D of R thigh on 8/29/18 and placed on long term course of IV antibiotics/discharged with PICC.  She completed the IV antibiotics a few weeks ago at rehab.  Hardware was unable to be removed at the time of last I&D secondary to unhealed recent fracture.      Patient states that they cannot walk because of pain. Patient denies numbness or tingling in the LLE. Patient denies hx of trauma. C/o fever/chills.  Says area of incision has been open since surgery.    PMH:  Pain of right hip joint  Migraine  Alcohol abuse  Seizure  Stented coronary artery  Coronary artery disease  Anxiety  HTN (hypertension)  Emphysema, unspecified  Anxiety  Migraine  CAD (coronary artery disease)  Hyperlipidemia  Hypertension    PSH:  Status post total hip replacement, right  No significant past surgical history  S/P bladder repair    AH:    Meds: See med rec    T(C): 36.8 (12-12-18 @ 15:45)  HR: 86 (12-12-18 @ 15:45)  BP: 110/54 (12-12-18 @ 15:45)  RR: 20 (12-12-18 @ 15:45)  SpO2: 97% (12-12-18 @ 15:45)  Wt(kg): --    PE RLE:  Skin intact, surgical incisions well appearing except for small ~2cm area of crusted material, no expressible fluid or drainage/discharge, +erythema/warmth of right femur, SILT L2-S1, +EHL/FHL/TA/Gastroc, +hip/ankle/knee ROM, DP+, soft compartments, no calf ttp.    Secondary Survey: No TTP over bony prominences, SILT, palpable pulses, full/painless range of motion, compartments soft    Imaging:  Pending

## 2018-12-12 NOTE — H&P ADULT - ASSESSMENT
pt w/ r hip sx / now w/ reness/ likely cellulitis  r/o infected hardware  id eval  iv abs  ortho eval  dvt proph  analgesics  cont outp t meds

## 2018-12-12 NOTE — ED PROVIDER NOTE - OBJECTIVE STATEMENT
72 yo F h/o seizure disorder, EtOH abuse, CAD with stent, s/p R total hip replacement and revision in August 2018 with complicated course requireing PICC line and abx while in rehab now presents with worsening pain, redness, swelling of R hip a/w difficulty ambulating. Pt endorses chills but no measured fevers. She denies fall, trauma, HA, LOC, N/V/D, abdominal pain, CP, SOB, cough, palpitations, blood in stool or urine.

## 2018-12-12 NOTE — H&P ADULT - NSHPLABSRESULTS_GEN_ALL_CORE
10.5   7.2   )-----------( 454      ( 12 Dec 2018 16:19 )             31.7       12-12    135  |  103  |  35<H>  ----------------------------<  127<H>  5.8<H>   |  16<L>  |  1.23    Ca    8.8      12 Dec 2018 16:19    TPro  7.6  /  Alb  3.0<L>  /  TBili  0.1<L>  /  DBili  x   /  AST  39  /  ALT  18  /  AlkPhos  120  12-12                      Lactate Trend            CAPILLARY BLOOD GLUCOSE

## 2018-12-12 NOTE — ED ADULT NURSE NOTE - INTERVENTIONS DEFINITIONS
[place] to call system/Stretcher in lowest position, wheels locked, appropriate side rails in place

## 2018-12-12 NOTE — ED ADULT NURSE NOTE - OBJECTIVE STATEMENT
74 y/o F, A&Ox4, enters ED by EMS w/ c/o R. leg pain. Hx. of COPD, HTN. Hx. of R. hip revision about 2 months ago. Pt. reports she has been unable to walk and has been "collapsing." Pt. presents w/ R. upper leg swollen and red and hard to touch. Pt. also presents w/ BLE pitting +2-3 edema and erythema. Both calves are erythremic, edematous and warm to touch. LLE is oozing clear drainage and blood - pt. is a poor historian and does not recall what happened. Pt. reports "maybe from when I was shopping yesterday and a shopping cart hit it." Pt. presents w/ lac to L. shin. No abdominal pain. No n/v. Pt. endorses fever/chills, however, is afebrile orally, but refuses rectal temp. MD aware. Pt. also c/o spine pain. Pt. has dry cough. Lung sounds clear bilaterally. Safety and comfort provided. Call bell within reach.

## 2018-12-12 NOTE — ED PROVIDER NOTE - MEDICAL DECISION MAKING DETAILS
Carlos Eduardo: pt present w R leg and thigh pain w increasing redness, pt likely with recurrent hardware infection, labs, ID and ortho consult, admit

## 2018-12-12 NOTE — ED ADULT NURSE REASSESSMENT NOTE - NS ED NURSE REASSESS COMMENT FT1
Pt is refusing a rectal temperature. Pt's right thigh is red, warm to touch and painful. Deshawn BOYCE made aware of pt's refusal. Will continue to monitor pt.

## 2018-12-12 NOTE — CONSULT NOTE ADULT - ASSESSMENT
A/P: 73F s/p R femur periprosthetic ORIF with I&D and completion of IV antibiotics (MRSA), now with worsening/return of symptoms  FU Xrays RLE  FU CT with contrast R femur  FU ESR/CRP  FU Blood cultures  Medical admission/workup for possible infection  If infected, will possibly need removal of hardware and I&D with another course of IV antibiotics  Pain control  DVT prophylaxis  FU ID Recommendations   Further recs to follow pending images/workup  Will discuss with Dr. Be and advise if plan changes A/P: 73F s/p R femur periprosthetic ORIF with I&D and completion of IV antibiotics (MRSA), now with worsening/return of symptoms  FU Xrays RLE  FU CT with contrast R femur  FU ESR/CRP  FU Blood cultures  Medical admission/workup for possible infection  Pain control  DVT prophylaxis  FU ID Recommendations   Further recs to follow pending images/workup  Will discuss with Dr. Be and advise if plan changes A/P: 73F s/p R femur periprosthetic ORIF with I&D and completion of IV antibiotics (MRSA), now with worsening/return of symptoms  Patient will likely need chronic suppressive antibiotics for life  Plan for nonoperative management for now secondary to morbidity of such a large surgical procedure unless patient becomes clinically unstable  Wound improved from last admission  FU Xrays RLE  FU CT with contrast R femur  FU ESR/CRP  FU Blood cultures  Medical admission/workup for possible infection  Pain control  DVT prophylaxis  FU ID Recommendations   Further recs to follow pending images/workup  Discussed with Dr Be, agrees with plan above

## 2018-12-12 NOTE — ED ADULT NURSE REASSESSMENT NOTE - NS ED NURSE REASSESS COMMENT FT1
report received from mike [place]. Pt found resting in semi-hernández position, non-labored respirations. VS documented, medications given as per MD order. Pt waiting for bed assignment. Will reassess

## 2018-12-12 NOTE — H&P ADULT - HISTORY OF PRESENT ILLNESS
74 yo Female w/  h/o seizure disorder, EtOH abuse, CAD with stent, s/p R total hip replacement and revision in August 2018 with complicated course requiring PICC line and abx while in rehab now presents with worsening pain, redness, swelling of R hip a/w difficulty ambulating  . Pt endorses chills   . She denies fall, trauma, HA, LOC, N/V/D, abdominal pain, CP, SOB, cough, palpitations,

## 2018-12-12 NOTE — ED PROVIDER NOTE - PHYSICAL EXAMINATION
A&Ox3 moderate distress. Erythema, edema, +ttp R hip to knee with induration. No discharge from wound. CN 2-12 grossly intact without focal neurologic defict. PERRLA, EOMI. Heart RRR no murmur. No JVD. No peripheral edema. Lungs CTA b/l no wheezes, rhonchi, rales. Abdomen soft nontender nondistended. Peripheral pulses present and equal b/l. Head NCAT.

## 2018-12-13 LAB
ANION GAP SERPL CALC-SCNC: 11 MMOL/L — SIGNIFICANT CHANGE UP (ref 5–17)
BUN SERPL-MCNC: 20 MG/DL — SIGNIFICANT CHANGE UP (ref 7–23)
CALCIUM SERPL-MCNC: 8.3 MG/DL — LOW (ref 8.4–10.5)
CHLORIDE SERPL-SCNC: 112 MMOL/L — HIGH (ref 96–108)
CO2 SERPL-SCNC: 18 MMOL/L — LOW (ref 22–31)
CREAT SERPL-MCNC: 0.68 MG/DL — SIGNIFICANT CHANGE UP (ref 0.5–1.3)
GLUCOSE SERPL-MCNC: 104 MG/DL — HIGH (ref 70–99)
HCT VFR BLD CALC: 28.9 % — LOW (ref 34.5–45)
HGB BLD-MCNC: 9.3 G/DL — LOW (ref 11.5–15.5)
MCHC RBC-ENTMCNC: 28.2 PG — SIGNIFICANT CHANGE UP (ref 27–34)
MCHC RBC-ENTMCNC: 32.2 GM/DL — SIGNIFICANT CHANGE UP (ref 32–36)
MCV RBC AUTO: 87.6 FL — SIGNIFICANT CHANGE UP (ref 80–100)
PLATELET # BLD AUTO: 361 K/UL — SIGNIFICANT CHANGE UP (ref 150–400)
POTASSIUM SERPL-MCNC: 4 MMOL/L — SIGNIFICANT CHANGE UP (ref 3.5–5.3)
POTASSIUM SERPL-SCNC: 4 MMOL/L — SIGNIFICANT CHANGE UP (ref 3.5–5.3)
RBC # BLD: 3.3 M/UL — LOW (ref 3.8–5.2)
RBC # FLD: 17.2 % — HIGH (ref 10.3–14.5)
SODIUM SERPL-SCNC: 141 MMOL/L — SIGNIFICANT CHANGE UP (ref 135–145)
WBC # BLD: 5.65 K/UL — SIGNIFICANT CHANGE UP (ref 3.8–10.5)
WBC # FLD AUTO: 5.65 K/UL — SIGNIFICANT CHANGE UP (ref 3.8–10.5)

## 2018-12-13 PROCEDURE — 99223 1ST HOSP IP/OBS HIGH 75: CPT

## 2018-12-13 RX ORDER — KETOROLAC TROMETHAMINE 30 MG/ML
15 SYRINGE (ML) INJECTION ONCE
Qty: 0 | Refills: 0 | Status: DISCONTINUED | OUTPATIENT
Start: 2018-12-13 | End: 2018-12-13

## 2018-12-13 RX ORDER — HYDROMORPHONE HYDROCHLORIDE 2 MG/ML
1 INJECTION INTRAMUSCULAR; INTRAVENOUS; SUBCUTANEOUS ONCE
Qty: 0 | Refills: 0 | Status: DISCONTINUED | OUTPATIENT
Start: 2018-12-13 | End: 2018-12-13

## 2018-12-13 RX ORDER — POTASSIUM CHLORIDE 20 MEQ
40 PACKET (EA) ORAL ONCE
Qty: 0 | Refills: 0 | Status: DISCONTINUED | OUTPATIENT
Start: 2018-12-13 | End: 2018-12-13

## 2018-12-13 RX ORDER — HYDROMORPHONE HYDROCHLORIDE 2 MG/ML
1 INJECTION INTRAMUSCULAR; INTRAVENOUS; SUBCUTANEOUS EVERY 4 HOURS
Qty: 0 | Refills: 0 | Status: DISCONTINUED | OUTPATIENT
Start: 2018-12-13 | End: 2018-12-20

## 2018-12-13 RX ORDER — POTASSIUM CHLORIDE 20 MEQ
10 PACKET (EA) ORAL
Qty: 0 | Refills: 0 | Status: DISCONTINUED | OUTPATIENT
Start: 2018-12-13 | End: 2018-12-13

## 2018-12-13 RX ADMIN — HYDROMORPHONE HYDROCHLORIDE 1 MILLIGRAM(S): 2 INJECTION INTRAMUSCULAR; INTRAVENOUS; SUBCUTANEOUS at 21:30

## 2018-12-13 RX ADMIN — Medication 15 MILLIGRAM(S): at 13:34

## 2018-12-13 RX ADMIN — Medication 0.1 MILLIGRAM(S): at 18:08

## 2018-12-13 RX ADMIN — PANTOPRAZOLE SODIUM 40 MILLIGRAM(S): 20 TABLET, DELAYED RELEASE ORAL at 09:18

## 2018-12-13 RX ADMIN — Medication 0.5 MILLIGRAM(S): at 22:57

## 2018-12-13 RX ADMIN — Medication 50 MILLIGRAM(S): at 05:09

## 2018-12-13 RX ADMIN — HYDROMORPHONE HYDROCHLORIDE 1 MILLIGRAM(S): 2 INJECTION INTRAMUSCULAR; INTRAVENOUS; SUBCUTANEOUS at 14:57

## 2018-12-13 RX ADMIN — Medication 0.1 MILLIGRAM(S): at 05:09

## 2018-12-13 RX ADMIN — OXYCODONE HYDROCHLORIDE 10 MILLIGRAM(S): 5 TABLET ORAL at 05:09

## 2018-12-13 RX ADMIN — Medication 1 PATCH: at 20:00

## 2018-12-13 RX ADMIN — HYDROMORPHONE HYDROCHLORIDE 0.5 MILLIGRAM(S): 2 INJECTION INTRAMUSCULAR; INTRAVENOUS; SUBCUTANEOUS at 05:10

## 2018-12-13 RX ADMIN — Medication 50 MILLIGRAM(S): at 21:09

## 2018-12-13 RX ADMIN — Medication 81 MILLIGRAM(S): at 13:34

## 2018-12-13 RX ADMIN — HEPARIN SODIUM 5000 UNIT(S): 5000 INJECTION INTRAVENOUS; SUBCUTANEOUS at 09:18

## 2018-12-13 RX ADMIN — LOSARTAN POTASSIUM 50 MILLIGRAM(S): 100 TABLET, FILM COATED ORAL at 05:10

## 2018-12-13 RX ADMIN — HYDROMORPHONE HYDROCHLORIDE 1 MILLIGRAM(S): 2 INJECTION INTRAMUSCULAR; INTRAVENOUS; SUBCUTANEOUS at 18:34

## 2018-12-13 RX ADMIN — GABAPENTIN 300 MILLIGRAM(S): 400 CAPSULE ORAL at 21:07

## 2018-12-13 RX ADMIN — POLYETHYLENE GLYCOL 3350 17 GRAM(S): 17 POWDER, FOR SOLUTION ORAL at 13:34

## 2018-12-13 RX ADMIN — Medication 15 MILLIGRAM(S): at 13:55

## 2018-12-13 RX ADMIN — HYDROMORPHONE HYDROCHLORIDE 1 MILLIGRAM(S): 2 INJECTION INTRAMUSCULAR; INTRAVENOUS; SUBCUTANEOUS at 18:07

## 2018-12-13 RX ADMIN — Medication 50 MILLIGRAM(S): at 13:34

## 2018-12-13 RX ADMIN — AMLODIPINE BESYLATE 10 MILLIGRAM(S): 2.5 TABLET ORAL at 05:09

## 2018-12-13 RX ADMIN — SODIUM CHLORIDE 70 MILLILITER(S): 9 INJECTION INTRAMUSCULAR; INTRAVENOUS; SUBCUTANEOUS at 05:10

## 2018-12-13 RX ADMIN — SODIUM CHLORIDE 70 MILLILITER(S): 9 INJECTION INTRAMUSCULAR; INTRAVENOUS; SUBCUTANEOUS at 21:07

## 2018-12-13 RX ADMIN — HYDROMORPHONE HYDROCHLORIDE 1 MILLIGRAM(S): 2 INJECTION INTRAMUSCULAR; INTRAVENOUS; SUBCUTANEOUS at 21:06

## 2018-12-13 RX ADMIN — Medication 250 MILLIGRAM(S): at 21:07

## 2018-12-13 RX ADMIN — GABAPENTIN 300 MILLIGRAM(S): 400 CAPSULE ORAL at 13:34

## 2018-12-13 RX ADMIN — HEPARIN SODIUM 5000 UNIT(S): 5000 INJECTION INTRAVENOUS; SUBCUTANEOUS at 21:08

## 2018-12-13 RX ADMIN — LISINOPRIL 10 MILLIGRAM(S): 2.5 TABLET ORAL at 05:09

## 2018-12-13 RX ADMIN — QUETIAPINE FUMARATE 150 MILLIGRAM(S): 200 TABLET, FILM COATED ORAL at 21:08

## 2018-12-13 RX ADMIN — HYDROMORPHONE HYDROCHLORIDE 0.5 MILLIGRAM(S): 2 INJECTION INTRAMUSCULAR; INTRAVENOUS; SUBCUTANEOUS at 09:05

## 2018-12-13 RX ADMIN — OXYCODONE HYDROCHLORIDE 10 MILLIGRAM(S): 5 TABLET ORAL at 18:15

## 2018-12-13 RX ADMIN — OXYCODONE HYDROCHLORIDE 10 MILLIGRAM(S): 5 TABLET ORAL at 18:35

## 2018-12-13 RX ADMIN — GABAPENTIN 300 MILLIGRAM(S): 400 CAPSULE ORAL at 05:10

## 2018-12-13 RX ADMIN — HYDROMORPHONE HYDROCHLORIDE 0.5 MILLIGRAM(S): 2 INJECTION INTRAMUSCULAR; INTRAVENOUS; SUBCUTANEOUS at 09:38

## 2018-12-13 NOTE — CONSULT NOTE ADULT - ASSESSMENT
pt with multiple comorbidities was hospitalized forMRSA bacteremia and an infected revision of a THP ( 5/18).  received an intraop debridement and a prolonged course of IV antibiotics.  Not sure when antibiotics stopped but has redness induration and pain over the proximal femur and hip. 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 With MRSA, we are virtually never able to cure the infection without removal.  She will likely need a girdlestone and ability to reimplant is not at all certain.  continue vanco  ct of the hip v MRI  ortho follow up.

## 2018-12-13 NOTE — CONSULT NOTE ADULT - SUBJECTIVE AND OBJECTIVE BOX
Patient is a 73y old  Female who presents with a chief complaint of   HPI:  72 yo Female w/  h/o seizure disorder, EtOH abuse, CAD with stent, s/p R total hip replacement and revision in August 2018 with complicated course requiring PICC line and abx while in rehab now presents with worsening pain, redness, swelling of R hip a/w difficulty ambulating  . Pt endorses chills   . She denies fall, trauma, HA, LOC, N/V/D, abdominal pain, CP, SOB, cough, palpitations, (12 Dec 2018 19:02)      PAST MEDICAL & SURGICAL HISTORY:  Pain of right hip joint  Migraine  Alcohol abuse  Seizure  Stented coronary artery  Coronary artery disease  Anxiety  HTN (hypertension)  Emphysema, unspecified  Anxiety  Migraine  CAD (coronary artery disease)  Hyperlipidemia  Hypertension  Status post total hip replacement, right: 5/21/18  S/P bladder repair      Social history:    FAMILY HISTORY:  Family history of coronary artery disease in daughter (Child)    REVIEW OF SYSTEMS  General:	        	  Ophthalmologic:Denies any visual complaints,discharge redness or photophobia  	  ENMT:No nasal discharge,headache,sinus congestion or throat pain.No dental complaints    Respiratory and Thorax:No cough,sputum or chest pain.Denies shortness of breath       Gastrointestinal:	NO nausea,abdominal pain or diarrhea.    Genitourinary:	No dysuria,frequency. No flank pain    Musculoskeletal:	 pain in right leg and hip    Neurological  confusion,        Hematology/Lymphatics:	No LN swelling.No gum bleeding     Endocrine:	No recent weight gain or loss.No abnormal heat/cold intolerance    Allergic/Immunologic:	No hives or rash   Allergies    No Known Allergies    Intolerances        Antimicrobials:    vancomycin  IVPB 1000 milliGRAM(s) IV Intermittent every 24 hours        Vital Signs Last 24 Hrs  T(C): 36.8 (13 Dec 2018 07:46), Max: 36.9 (13 Dec 2018 05:06)  T(F): 98.2 (13 Dec 2018 07:46), Max: 98.4 (13 Dec 2018 05:06)  HR: 80 (13 Dec 2018 07:46) (74 - 91)  BP: 128/55 (13 Dec 2018 07:46) (107/64 - 149/71)  BP(mean): --  RR: 18 (13 Dec 2018 07:46) (18 - 20)  SpO2: 96% (13 Dec 2018 07:46) (95% - 97%)     .       .Awake    cachexia     Eyes:PERRL EOMI.NO discharge or conjunctival injection    ENMT:No sinus tenderness.No thrush.No pharyngeal exudate or erythema.Fair dental hygiene    Neck:Supple,No LN,no JVD      Respiratory:Good air entry bilaterally,CTA         Gastrointestinal:Soft BS(+) no tenderness no masses ,No rebound or guarding    Genitourinary:No CVA tendereness     Rectal:    Extremities: right ip to mid femur is indurated , red and tender    Vascular:peripheral pulses felt       Skin:No rash     Lymph Nodes:No palpable LNs                                          9.3    5.65  )-----------( 361      ( 13 Dec 2018 07:29 )             28.9         12-13    141  |  112<H>  |  20  ----------------------------<  104<H>  4.0   |  18<L>  |  0.68    Ca    8.3<L>      13 Dec 2018 06:21    TPro  7.6  /  Alb  3.0<L>  /  TBili  0.1<L>  /  DBili  x   /  AST  39  /  ALT  18  /  AlkPhos  120  12-12      RECENT CULTURES:      MICROBIOLOGY:          Radiology:      Assessment:        Recommendations and Plan:    Pager [phone]  After 5 pm/weekends or if no response :[phone]

## 2018-12-14 ENCOUNTER — TRANSCRIPTION ENCOUNTER (OUTPATIENT)
Age: 73
End: 2018-12-14

## 2018-12-14 LAB
HCT VFR BLD CALC: 28.6 % — LOW (ref 34.5–45)
HGB BLD-MCNC: 9.1 G/DL — LOW (ref 11.5–15.5)
MCHC RBC-ENTMCNC: 27.6 PG — SIGNIFICANT CHANGE UP (ref 27–34)
MCHC RBC-ENTMCNC: 31.8 GM/DL — LOW (ref 32–36)
MCV RBC AUTO: 86.7 FL — SIGNIFICANT CHANGE UP (ref 80–100)
PLATELET # BLD AUTO: 330 K/UL — SIGNIFICANT CHANGE UP (ref 150–400)
RBC # BLD: 3.3 M/UL — LOW (ref 3.8–5.2)
RBC # FLD: 17.7 % — HIGH (ref 10.3–14.5)
WBC # BLD: 6.24 K/UL — SIGNIFICANT CHANGE UP (ref 3.8–10.5)
WBC # FLD AUTO: 6.24 K/UL — SIGNIFICANT CHANGE UP (ref 3.8–10.5)

## 2018-12-14 PROCEDURE — 99232 SBSQ HOSP IP/OBS MODERATE 35: CPT

## 2018-12-14 RX ORDER — DIPHENHYDRAMINE HCL 50 MG
25 CAPSULE ORAL ONCE
Qty: 0 | Refills: 0 | Status: COMPLETED | OUTPATIENT
Start: 2018-12-14 | End: 2018-12-14

## 2018-12-14 RX ORDER — ACETAMINOPHEN 500 MG
1000 TABLET ORAL ONCE
Qty: 0 | Refills: 0 | Status: COMPLETED | OUTPATIENT
Start: 2018-12-14 | End: 2018-12-14

## 2018-12-14 RX ADMIN — Medication 50 MILLIGRAM(S): at 13:48

## 2018-12-14 RX ADMIN — Medication 1 PATCH: at 19:15

## 2018-12-14 RX ADMIN — Medication 0.1 MILLIGRAM(S): at 17:35

## 2018-12-14 RX ADMIN — Medication 1 PATCH: at 05:43

## 2018-12-14 RX ADMIN — HYDROMORPHONE HYDROCHLORIDE 1 MILLIGRAM(S): 2 INJECTION INTRAMUSCULAR; INTRAVENOUS; SUBCUTANEOUS at 20:27

## 2018-12-14 RX ADMIN — SODIUM CHLORIDE 70 MILLILITER(S): 9 INJECTION INTRAMUSCULAR; INTRAVENOUS; SUBCUTANEOUS at 08:46

## 2018-12-14 RX ADMIN — Medication 81 MILLIGRAM(S): at 11:40

## 2018-12-14 RX ADMIN — HYDROMORPHONE HYDROCHLORIDE 1 MILLIGRAM(S): 2 INJECTION INTRAMUSCULAR; INTRAVENOUS; SUBCUTANEOUS at 13:10

## 2018-12-14 RX ADMIN — HEPARIN SODIUM 5000 UNIT(S): 5000 INJECTION INTRAVENOUS; SUBCUTANEOUS at 09:39

## 2018-12-14 RX ADMIN — HYDROMORPHONE HYDROCHLORIDE 1 MILLIGRAM(S): 2 INJECTION INTRAMUSCULAR; INTRAVENOUS; SUBCUTANEOUS at 05:00

## 2018-12-14 RX ADMIN — HYDROMORPHONE HYDROCHLORIDE 1 MILLIGRAM(S): 2 INJECTION INTRAMUSCULAR; INTRAVENOUS; SUBCUTANEOUS at 21:00

## 2018-12-14 RX ADMIN — LOSARTAN POTASSIUM 50 MILLIGRAM(S): 100 TABLET, FILM COATED ORAL at 05:43

## 2018-12-14 RX ADMIN — HYDROMORPHONE HYDROCHLORIDE 1 MILLIGRAM(S): 2 INJECTION INTRAMUSCULAR; INTRAVENOUS; SUBCUTANEOUS at 09:39

## 2018-12-14 RX ADMIN — GABAPENTIN 300 MILLIGRAM(S): 400 CAPSULE ORAL at 13:48

## 2018-12-14 RX ADMIN — Medication 400 MILLIGRAM(S): at 17:32

## 2018-12-14 RX ADMIN — OXYCODONE HYDROCHLORIDE 10 MILLIGRAM(S): 5 TABLET ORAL at 18:29

## 2018-12-14 RX ADMIN — HYDROMORPHONE HYDROCHLORIDE 1 MILLIGRAM(S): 2 INJECTION INTRAMUSCULAR; INTRAVENOUS; SUBCUTANEOUS at 16:44

## 2018-12-14 RX ADMIN — Medication 50 MILLIGRAM(S): at 21:19

## 2018-12-14 RX ADMIN — GABAPENTIN 300 MILLIGRAM(S): 400 CAPSULE ORAL at 05:42

## 2018-12-14 RX ADMIN — Medication 50 MILLIGRAM(S): at 05:44

## 2018-12-14 RX ADMIN — OXYCODONE HYDROCHLORIDE 10 MILLIGRAM(S): 5 TABLET ORAL at 06:39

## 2018-12-14 RX ADMIN — PANTOPRAZOLE SODIUM 40 MILLIGRAM(S): 20 TABLET, DELAYED RELEASE ORAL at 05:45

## 2018-12-14 RX ADMIN — Medication 0.5 MILLIGRAM(S): at 17:33

## 2018-12-14 RX ADMIN — Medication 250 MILLIGRAM(S): at 20:14

## 2018-12-14 RX ADMIN — HYDROMORPHONE HYDROCHLORIDE 1 MILLIGRAM(S): 2 INJECTION INTRAMUSCULAR; INTRAVENOUS; SUBCUTANEOUS at 12:40

## 2018-12-14 RX ADMIN — POLYETHYLENE GLYCOL 3350 17 GRAM(S): 17 POWDER, FOR SOLUTION ORAL at 11:43

## 2018-12-14 RX ADMIN — OXYCODONE HYDROCHLORIDE 10 MILLIGRAM(S): 5 TABLET ORAL at 17:59

## 2018-12-14 RX ADMIN — HEPARIN SODIUM 5000 UNIT(S): 5000 INJECTION INTRAVENOUS; SUBCUTANEOUS at 21:21

## 2018-12-14 RX ADMIN — OXYCODONE HYDROCHLORIDE 10 MILLIGRAM(S): 5 TABLET ORAL at 07:15

## 2018-12-14 RX ADMIN — HYDROMORPHONE HYDROCHLORIDE 1 MILLIGRAM(S): 2 INJECTION INTRAMUSCULAR; INTRAVENOUS; SUBCUTANEOUS at 17:14

## 2018-12-14 RX ADMIN — Medication 0.5 MILLIGRAM(S): at 05:44

## 2018-12-14 RX ADMIN — SENNA PLUS 2 TABLET(S): 8.6 TABLET ORAL at 21:20

## 2018-12-14 RX ADMIN — Medication 1 PATCH: at 08:18

## 2018-12-14 RX ADMIN — GABAPENTIN 300 MILLIGRAM(S): 400 CAPSULE ORAL at 21:19

## 2018-12-14 RX ADMIN — HYDROMORPHONE HYDROCHLORIDE 1 MILLIGRAM(S): 2 INJECTION INTRAMUSCULAR; INTRAVENOUS; SUBCUTANEOUS at 08:45

## 2018-12-14 RX ADMIN — Medication 0.1 MILLIGRAM(S): at 05:42

## 2018-12-14 RX ADMIN — HYDROMORPHONE HYDROCHLORIDE 1 MILLIGRAM(S): 2 INJECTION INTRAMUSCULAR; INTRAVENOUS; SUBCUTANEOUS at 04:41

## 2018-12-14 RX ADMIN — QUETIAPINE FUMARATE 150 MILLIGRAM(S): 200 TABLET, FILM COATED ORAL at 20:32

## 2018-12-14 RX ADMIN — AMLODIPINE BESYLATE 10 MILLIGRAM(S): 2.5 TABLET ORAL at 05:43

## 2018-12-14 RX ADMIN — LISINOPRIL 10 MILLIGRAM(S): 2.5 TABLET ORAL at 05:42

## 2018-12-14 RX ADMIN — Medication 25 MILLIGRAM(S): at 05:44

## 2018-12-14 RX ADMIN — Medication 1000 MILLIGRAM(S): at 18:02

## 2018-12-14 RX ADMIN — SODIUM CHLORIDE 70 MILLILITER(S): 9 INJECTION INTRAMUSCULAR; INTRAVENOUS; SUBCUTANEOUS at 20:32

## 2018-12-14 NOTE — DISCHARGE NOTE ADULT - PLAN OF CARE
resolution of symptoms pt will be on IV vanco until she follows up with ortho for definitive treatment of her hardware  IV vanco is not curative  lifelong suppression is not an option Coronary artery disease is a condition where the arteries the supply the heart muscle get clogges with fatty deposits & puts you at risk for a heart attack  Call your doctor if you have any new pain, pressure, or discomfort in the center of your chest, pain, tingling or discomfort in arms, back, neck, jaw, or stomach, shortness of breath, nausea, vomiting, burping or heartburn, sweating, cold and clammy skin, racing or abnormal heartbeat for more than 10 minutes or if they keep coming & going.  Call 911 and do not tr to get to hospital by care  You can help yourself with lefestyle changes (quitting smoking if you smoke), eat lots of fruits & vegetables & low fat dairy products, not a lot of meat & fatty foods, walk or some form of physical activity most days of the week, lose weight if you are overweight  Take your cardiac medication as prescribed to lower cholesterol, to lower blood pressure, aspirin to prevent blood clots, and diabetes control  Make sure to keep appointments with doctor for cardiac follow up care Follow up with your medical doctor to establish long term blood pressure treatment goals. pt will need definitive treatment of the infected hardware pt will be on IV vanco until she follows up with ortho for definitive treatment of her hardware  IV vanco is not curative  lifelong suppression is not an option  Follow-up with your primary care physician within 1 week. Call for appointment.  Follow-up outpatient with Orthopedist Dr. Sprague within 1 week. Call for appointment.

## 2018-12-14 NOTE — DISCHARGE NOTE ADULT - PROVIDER TOKENS
TOKEN:'[id number]',FREE:[LAST:[[place]],FIRST:[L],PHONE:[(   )    -],FAX:[(   )    -]],TOKEN:'[id number]' TOKEN:'[id number]',FREE:[LAST:[[place]],FIRST:[L],PHONE:[(   )    -],FAX:[(   )    -]],TOKEN:'[id number] TOKEN:'[id number]',FREE:[LAST:[[place]],FIRST:[L],PHONE:[(   )    -],FAX:[(   )    -]],TOKEN:'[id number]'

## 2018-12-14 NOTE — DISCHARGE NOTE ADULT - MEDICATION SUMMARY - MEDICATIONS TO TAKE
I will START or STAY ON the medications listed below when I get home from the hospital:    budesonide 0.5 mg/2 mL inhalation suspension  -- 0.5 milligram(s) inhaled 2 times a day   -- Indication: For emphysema    oxyCODONE 10 mg oral tablet, extended release  -- 1 tab(s) by mouth every 12 hours MDD:2 tabs  -- Indication: For pain    Fioricet oral tablet  -- 1 tab(s) by mouth every 4 hours, As Needed  -- Indication: For Headaches    Tylenol with Codeine #4 oral tablet  -- 1 tab(s) by mouth every 6 hours, As Needed  -- Indication: For pain    aspirin 81 mg oral delayed release tablet  -- 1 tab(s) by mouth once a day  -- Indication: For protect from clot formation    lisinopril 10 mg oral tablet  -- 1 tab(s) by mouth once a day  -- Indication: For High blood pressure    losartan 50 mg oral tablet  -- 1 tab(s) by mouth once a day  -- Indication: For High blood pressure    cloNIDine 0.1 mg oral tablet  -- 1 tab(s) by mouth 2 times a day  -- Indication: For High blood pressure    gabapentin 300 mg oral capsule  -- 1 cap(s) by mouth 3 times a day  -- Indication: For pain    doxycycline hyclate 100 mg oral capsule  -- 1 cap(s) by mouth 2 times a day   -- Avoid prolonged or excessive exposure to direct and/or artificial sunlight while taking this medication.  Do not take this drug if you are pregnant.  Finish all this medication unless otherwise directed by prescriber.  Medication should be taken with plenty of water.    -- Indication: For MRSA bacteremia    QUEtiapine 50 mg oral tablet  -- 3 tab(s) by mouth once a day (at bedtime)  -- Indication: For anxiety    amLODIPine 10 mg oral tablet  -- 1 tab(s) by mouth once a day  -- Indication: For High blood pressure    polyethylene glycol 3350 oral powder for reconstitution  -- 17 gram(s) by mouth once a day, As Needed  -- Indication: For Constipation    senna oral tablet  -- 1 tab(s) by mouth once a day (at bedtime)  -- Indication: For Constipation    pantoprazole 40 mg oral delayed release tablet  -- 1 tab(s) by mouth once a day (before a meal)  -- Indication: For protect from indigestion    nicotine 21 mg/24 hr transdermal film, extended release  -- 1 patch by transdermal patch once a day  -- Indication: For Smoke cessation    hydrALAZINE 50 mg oral tablet  -- 1 tab(s) by mouth every 8 hours  -- Indication: For High blood pressure    ascorbic acid 500 mg oral tablet  -- 1 tab(s) by mouth 2 times a day  -- Indication: For Supplement    folic acid 1 mg oral tablet  -- 1 tab(s) by mouth once a day  -- Indication: For Supplement I will START or STAY ON the medications listed below when I get home from the hospital:    budesonide 0.5 mg/2 mL inhalation suspension  -- 0.5 milligram(s) inhaled 2 times a day   -- Indication: For emphysema    Fioricet oral tablet  -- 1 tab(s) by mouth every 4 hours, As Needed  -- Indication: For Headaches    Tylenol with Codeine #4 oral tablet  -- 1 tab(s) by mouth every 6 hours, As Needed  -- Indication: For pain    aspirin 81 mg oral delayed release tablet  -- 1 tab(s) by mouth once a day  -- Indication: For protect from clot formation    morphine 15 mg/8 to 12 hr oral tablet, extended release  -- 1 tab(s) by mouth every 12 hours MDD:2 tabs  -- Indication: For pain    lisinopril 10 mg oral tablet  -- 1 tab(s) by mouth once a day  -- Indication: For High blood pressure    losartan 50 mg oral tablet  -- 1 tab(s) by mouth once a day  -- Indication: For High blood pressure    cloNIDine 0.1 mg oral tablet  -- 1 tab(s) by mouth 2 times a day  -- Indication: For High blood pressure    gabapentin 300 mg oral capsule  -- 1 cap(s) by mouth 3 times a day  -- Indication: For pain    doxycycline hyclate 100 mg oral capsule  -- 1 cap(s) by mouth 2 times a day   -- Avoid prolonged or excessive exposure to direct and/or artificial sunlight while taking this medication.  Do not take this drug if you are pregnant.  Finish all this medication unless otherwise directed by prescriber.  Medication should be taken with plenty of water.    -- Indication: For MRSA bacteremia    QUEtiapine 50 mg oral tablet  -- 3 tab(s) by mouth once a day (at bedtime)  -- Indication: For anxiety    amLODIPine 10 mg oral tablet  -- 1 tab(s) by mouth once a day  -- Indication: For High blood pressure    polyethylene glycol 3350 oral powder for reconstitution  -- 17 gram(s) by mouth once a day, As Needed  -- Indication: For Constipation    senna oral tablet  -- 1 tab(s) by mouth once a day (at bedtime)  -- Indication: For Constipation    pantoprazole 40 mg oral delayed release tablet  -- 1 tab(s) by mouth once a day (before a meal)  -- Indication: For protect from indigestion    nicotine 21 mg/24 hr transdermal film, extended release  -- 1 patch by transdermal patch once a day  -- Indication: For Smoke cessation    hydrALAZINE 50 mg oral tablet  -- 1 tab(s) by mouth every 8 hours  -- Indication: For High blood pressure    ascorbic acid 500 mg oral tablet  -- 1 tab(s) by mouth 2 times a day  -- Indication: For Supplement    folic acid 1 mg oral tablet  -- 1 tab(s) by mouth once a day  -- Indication: For Supplement

## 2018-12-14 NOTE — DISCHARGE NOTE ADULT - ADDITIONAL INSTRUCTIONS
follow up with ID and ortho within 1 week after discharge follow up with ID within 1 week. Call for appointment.   Follow-up with your primary care physician within 1 week. Call for appointment.  Follow-up outpatient with Orthopedist Dr. Sprague within 1 week. Call for appointment. follow up with ID within 1 week. Call for appointment.   Follow-up with your primary care physician within 1 week. Call for appointment.  Follow-up outpatient with Orthopedist Dr. Be in 2 to 3 weeks.  Call for appointment.

## 2018-12-14 NOTE — DISCHARGE NOTE ADULT - HOSPITAL COURSE
pt with multiple comorbidities was hospitalized forMRSA bacteremia and an infected revision of a THP ( 5/18).  received an intraop debridement and a prolonged course of IV antibiotics.  Not sure when antibiotics stopped but has redness induration and pain over the proximal femur and hip. 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 With MRSA, we are virtually never able to cure the infection without removal.  She will likely need a girdlestone and ability to reimplant is not at all certain.  continue vanco  ct of the hip  reviewed    ortho follow up. Ortho not excited about hardware REMOVAL BUT SUPPRESSION I does not seem to be a good option     discussed with ortho  pt  suppression is not a long term solution with hx of mrsa bacteremia   discussed with ortho attending Dr. Sprague who is reviewing case with colleagues   we can probably send home for the holiday, but at some point this is going to have to be dealt with.  no change in vanco dose at present  ( not curative)

## 2018-12-14 NOTE — DISCHARGE NOTE ADULT - CARE PROVIDER_API CALL
Prashant Gallego (MD), Infectious Disease; Internal Medicine  [address] [facility]  [address]  [place] [address]  Phone: [phone]  Fax: [phone]    KENJI Hugo  Phone: (   )    -  Fax: (   )    -    Mychal Cornell), Orthopaedic Surgery  [address] [facility] [address]  [place] [address]  Phone: [phone]  Fax: [phone] Mychal Cornell (MD), Orthopaedic Surgery  [address] [address]  Phone: [phone]  Fax: [phone]    KENJI Hugo  Phone: (   )    -  Fax: (   )    -    Christopher So), Infectious Disease; Internal Medicine  [address] [place] [address]  Phone: [phone]  Fax: [phone] Christopher So (MD), Infectious Disease; Internal Medicine  [address] [place] [address]  Phone: [phone]  Fax: [phone]    KENJI Hugo  Phone: (   )    -  Fax: (   )    -    Megan Be), Orthopaedic Surgery  [address] [place] [address]  Phone: [phone]  Fax: [phone]

## 2018-12-14 NOTE — CHART NOTE - NSCHARTNOTEFT_GEN_A_CORE
Notified by RN for c/o allergic reaction. Patient seen and examined at bedside, in NAD. Patient states that she woke up feeling like she was having an allergic reaction, described as a "strange" sensation to her lips and teeth, and that her voice sounds more hoarse than usual. Pt relates she's had similar episodes in the past which she attributed to exposure to unknown food allergen. Patient denies any SOB, swelling of mouth or airway,     Vital Signs Last 24 Hrs  T(C): 36.8 (14 Dec 2018 04:33), Max: 36.9 (13 Dec 2018 19:45)  T(F): 98.2 (14 Dec 2018 04:33), Max: 98.5 (13 Dec 2018 19:45)  HR: 82 (14 Dec 2018 04:33) (80 - 90)  BP: 154/68 (14 Dec 2018 04:33) (123/68 - 178/62)  BP(mean): --  RR: 17 (14 Dec 2018 04:33) (17 - 18)  SpO2: 97% (14 Dec 2018 04:33) (96% - 97%) Notified by RN for c/o allergic reaction. Patient seen and examined at bedside, in [facility]. Patient states that she woke up around 3AM feeling like she was having an allergic reaction, described as a "strange" sensation to her lips and teeth, and that her voice sounds more hoarse than usual. Pt relates she's had similar episodes in the past which she attributed to exposure to unknown food allergen. No new food or medication exposure. Patient had received a dose of vancomycin the night prior, no reactions to vanco on previous admission. Patient denies any itchiness, rash/hives, SOB, swelling of mouth or airway, chest pain, N/V, F/C, dizziness.     Vital Signs Last 24 Hrs  T(C): 36.8 (14 Dec 2018 04:33), Max: 36.9 (13 Dec 2018 19:45)  T(F): 98.2 (14 Dec 2018 04:33), Max: 98.5 (13 Dec 2018 19:45)  HR: 82 (14 Dec 2018 04:33) (80 - 90)  BP: 154/68 (14 Dec 2018 04:33) (123/68 - 178/62)  BP(mean): --  RR: 17 (14 Dec 2018 04:33) (17 - 18)  SpO2: 97% (14 Dec 2018 04:33) (96% - 97%)    Physical Exam:  General: anxious, lying in bed in [facility]  Neuro: A&Ox3, no focal deficits, CN II-XII grossly intact, EOMI, motor strength 5/5   Skin: small, nonindurated erythematous rash noted above upper lip slightly left of midline  ENT: patent airway  Card: S1/S2, RRR  Pulm: CTA b/l, no accessory muscle use, respirations even and non-labored  Abd: soft, nontender, nondistended  Ext: no Le edema    A/P:  72 yo Female w/  h/o seizure disorder, EtOH abuse, CAD with stent, s/p R total hip replacement and revision in August 2018 with complicated course requiring PICC line and abx while in rehab admitted with worsening pain, redness, swelling of R hip concerning for recurrent hardware. Now acutely presenting with "allergic reaction".    # Rash, upper lip. ?reaction to vanco/jeronimo syndrome vs other allergen. No signs of anaphylaxis on exam.  - VS hemodynamically stable.  - PO benadryl x 1 dose.  - Continue with vancomycin, RN instructed to infuse at slower rate for future doses.  - Continue to monitor.  Endorsed to daytime coverage in AM.    Mona Goff PA-C  Department of Medicine

## 2018-12-14 NOTE — DISCHARGE NOTE ADULT - PATIENT PORTAL LINK FT
You can access the [facility] Patient Portal, offered by [facility], by registering with the following website: [email or web address]

## 2018-12-14 NOTE — DISCHARGE NOTE ADULT - MEDICATION SUMMARY - MEDICATIONS TO CHANGE
I will SWITCH the dose or number of times a day I take the medications listed below when I get home from the hospital:    QUEtiapine 50 mg oral tablet  -- 1 tab(s) by mouth every 6 hours, As needed, anxiety/insomnia

## 2018-12-14 NOTE — DISCHARGE NOTE ADULT - CARE PLAN
Principal Discharge DX:	Infected hardware in right lower extremity, sequela  Goal:	resolution of symptoms  Assessment and plan of treatment:	pt will be on IV vanco until she follows up with ortho for definitive treatment of her hardware  IV vanco is not curative  lifelong suppression is not an option  Secondary Diagnosis:	CAD (coronary artery disease)  Assessment and plan of treatment:	Coronary artery disease is a condition where the arteries the supply the heart muscle get clogges with fatty deposits & puts you at risk for a heart attack  Call your doctor if you have any new pain, pressure, or discomfort in the center of your chest, pain, tingling or discomfort in arms, back, neck, jaw, or stomach, shortness of breath, nausea, vomiting, burping or heartburn, sweating, cold and clammy skin, racing or abnormal heartbeat for more than 10 minutes or if they keep coming & going.  Call 911 and do not tr to get to hospital by care  You can help yourself with lefestyle changes (quitting smoking if you smoke), eat lots of fruits & vegetables & low fat dairy products, not a lot of meat & fatty foods, walk or some form of physical activity most days of the week, lose weight if you are overweight  Take your cardiac medication as prescribed to lower cholesterol, to lower blood pressure, aspirin to prevent blood clots, and diabetes control  Make sure to keep appointments with doctor for cardiac follow up care  Secondary Diagnosis:	HTN (hypertension)  Assessment and plan of treatment:	Follow up with your medical doctor to establish long term blood pressure treatment goals.  Secondary Diagnosis:	Status post total hip replacement, right  Assessment and plan of treatment:	pt will need definitive treatment of the infected hardware Principal Discharge DX:	Infected hardware in right lower extremity, sequela  Goal:	resolution of symptoms  Assessment and plan of treatment:	pt will be on IV vanco until she follows up with ortho for definitive treatment of her hardware  IV vanco is not curative  lifelong suppression is not an option  Follow-up with your primary care physician within 1 week. Call for appointment.  Follow-up outpatient with Orthopedist Dr. Sprague within 1 week. Call for appointment.  Secondary Diagnosis:	CAD (coronary artery disease)  Assessment and plan of treatment:	Coronary artery disease is a condition where the arteries the supply the heart muscle get clogges with fatty deposits & puts you at risk for a heart attack  Call your doctor if you have any new pain, pressure, or discomfort in the center of your chest, pain, tingling or discomfort in arms, back, neck, jaw, or stomach, shortness of breath, nausea, vomiting, burping or heartburn, sweating, cold and clammy skin, racing or abnormal heartbeat for more than 10 minutes or if they keep coming & going.  Call 911 and do not tr to get to hospital by care  You can help yourself with lefestyle changes (quitting smoking if you smoke), eat lots of fruits & vegetables & low fat dairy products, not a lot of meat & fatty foods, walk or some form of physical activity most days of the week, lose weight if you are overweight  Take your cardiac medication as prescribed to lower cholesterol, to lower blood pressure, aspirin to prevent blood clots, and diabetes control  Make sure to keep appointments with doctor for cardiac follow up care  Secondary Diagnosis:	HTN (hypertension)  Assessment and plan of treatment:	Follow up with your medical doctor to establish long term blood pressure treatment goals.  Secondary Diagnosis:	Status post total hip replacement, right  Assessment and plan of treatment:	pt will need definitive treatment of the infected hardware

## 2018-12-14 NOTE — DISCHARGE NOTE ADULT - MEDICATION SUMMARY - MEDICATIONS TO STOP TAKING
I will STOP taking the medications listed below when I get home from the hospital:    vancomycin 1 g intravenous injection  -- 1 gram(s) intravenous every 12 hours    HYDROmorphone 4 mg oral tablet  -- 1 tab(s) by mouth every 3 hours, As needed, Moderate Pain (4 - 6)    HYDROmorphone 8 mg oral tablet  -- 1 tab(s) by mouth every 3 hours, As needed, Severe Pain (7 - 10)    traMADol 50 mg oral tablet  -- 1 tab(s) by mouth every 6 hours    rifAMPin 600 mg intravenous injection  -- 1 dose(s) intravenous once a day through 10/29/18 I will STOP taking the medications listed below when I get home from the hospital:    oxyCODONE 20 mg oral tablet, extended release  -- 1 tab(s) by mouth every 12 hours    vancomycin 1 g intravenous injection  -- 1 gram(s) intravenous every 12 hours    HYDROmorphone 4 mg oral tablet  -- 1 tab(s) by mouth every 3 hours, As needed, Moderate Pain (4 - 6)    HYDROmorphone 8 mg oral tablet  -- 1 tab(s) by mouth every 3 hours, As needed, Severe Pain (7 - 10)    traMADol 50 mg oral tablet  -- 1 tab(s) by mouth every 6 hours    rifAMPin 600 mg intravenous injection  -- 1 dose(s) intravenous once a day through 10/29/18

## 2018-12-15 LAB
ANION GAP SERPL CALC-SCNC: 12 MMOL/L — SIGNIFICANT CHANGE UP (ref 5–17)
BUN SERPL-MCNC: 12 MG/DL — SIGNIFICANT CHANGE UP (ref 7–23)
CALCIUM SERPL-MCNC: 8.6 MG/DL — SIGNIFICANT CHANGE UP (ref 8.4–10.5)
CHLORIDE SERPL-SCNC: 105 MMOL/L — SIGNIFICANT CHANGE UP (ref 96–108)
CO2 SERPL-SCNC: 24 MMOL/L — SIGNIFICANT CHANGE UP (ref 22–31)
CREAT SERPL-MCNC: 0.52 MG/DL — SIGNIFICANT CHANGE UP (ref 0.5–1.3)
GLUCOSE SERPL-MCNC: 90 MG/DL — SIGNIFICANT CHANGE UP (ref 70–99)
HCT VFR BLD CALC: 30.9 % — LOW (ref 34.5–45)
HGB BLD-MCNC: 9.6 G/DL — LOW (ref 11.5–15.5)
MCHC RBC-ENTMCNC: 27.4 PG — SIGNIFICANT CHANGE UP (ref 27–34)
MCHC RBC-ENTMCNC: 31.1 GM/DL — LOW (ref 32–36)
MCV RBC AUTO: 88 FL — SIGNIFICANT CHANGE UP (ref 80–100)
PLATELET # BLD AUTO: 348 K/UL — SIGNIFICANT CHANGE UP (ref 150–400)
POTASSIUM SERPL-MCNC: 4 MMOL/L — SIGNIFICANT CHANGE UP (ref 3.5–5.3)
POTASSIUM SERPL-SCNC: 4 MMOL/L — SIGNIFICANT CHANGE UP (ref 3.5–5.3)
RBC # BLD: 3.51 M/UL — LOW (ref 3.8–5.2)
RBC # FLD: 18.1 % — HIGH (ref 10.3–14.5)
SODIUM SERPL-SCNC: 141 MMOL/L — SIGNIFICANT CHANGE UP (ref 135–145)
WBC # BLD: 5.75 K/UL — SIGNIFICANT CHANGE UP (ref 3.8–10.5)
WBC # FLD AUTO: 5.75 K/UL — SIGNIFICANT CHANGE UP (ref 3.8–10.5)

## 2018-12-15 PROCEDURE — 73564 X-RAY EXAM KNEE 4 OR MORE: CPT | Mod: 26,RT

## 2018-12-15 RX ORDER — ACETAMINOPHEN 500 MG
650 TABLET ORAL ONCE
Qty: 0 | Refills: 0 | Status: COMPLETED | OUTPATIENT
Start: 2018-12-15 | End: 2018-12-15

## 2018-12-15 RX ADMIN — HYDROMORPHONE HYDROCHLORIDE 1 MILLIGRAM(S): 2 INJECTION INTRAMUSCULAR; INTRAVENOUS; SUBCUTANEOUS at 15:10

## 2018-12-15 RX ADMIN — Medication 81 MILLIGRAM(S): at 12:03

## 2018-12-15 RX ADMIN — LISINOPRIL 10 MILLIGRAM(S): 2.5 TABLET ORAL at 06:51

## 2018-12-15 RX ADMIN — Medication 50 MILLIGRAM(S): at 14:15

## 2018-12-15 RX ADMIN — Medication 0.1 MILLIGRAM(S): at 17:41

## 2018-12-15 RX ADMIN — QUETIAPINE FUMARATE 150 MILLIGRAM(S): 200 TABLET, FILM COATED ORAL at 20:29

## 2018-12-15 RX ADMIN — AMLODIPINE BESYLATE 10 MILLIGRAM(S): 2.5 TABLET ORAL at 06:50

## 2018-12-15 RX ADMIN — HYDROMORPHONE HYDROCHLORIDE 1 MILLIGRAM(S): 2 INJECTION INTRAMUSCULAR; INTRAVENOUS; SUBCUTANEOUS at 15:40

## 2018-12-15 RX ADMIN — Medication 250 MILLIGRAM(S): at 20:28

## 2018-12-15 RX ADMIN — Medication 50 MILLIGRAM(S): at 20:30

## 2018-12-15 RX ADMIN — HYDROMORPHONE HYDROCHLORIDE 1 MILLIGRAM(S): 2 INJECTION INTRAMUSCULAR; INTRAVENOUS; SUBCUTANEOUS at 19:35

## 2018-12-15 RX ADMIN — OXYCODONE HYDROCHLORIDE 10 MILLIGRAM(S): 5 TABLET ORAL at 17:38

## 2018-12-15 RX ADMIN — GABAPENTIN 300 MILLIGRAM(S): 400 CAPSULE ORAL at 14:15

## 2018-12-15 RX ADMIN — Medication 50 MILLIGRAM(S): at 06:50

## 2018-12-15 RX ADMIN — HYDROMORPHONE HYDROCHLORIDE 1 MILLIGRAM(S): 2 INJECTION INTRAMUSCULAR; INTRAVENOUS; SUBCUTANEOUS at 20:05

## 2018-12-15 RX ADMIN — LOSARTAN POTASSIUM 50 MILLIGRAM(S): 100 TABLET, FILM COATED ORAL at 06:50

## 2018-12-15 RX ADMIN — SODIUM CHLORIDE 70 MILLILITER(S): 9 INJECTION INTRAMUSCULAR; INTRAVENOUS; SUBCUTANEOUS at 07:44

## 2018-12-15 RX ADMIN — Medication 650 MILLIGRAM(S): at 12:02

## 2018-12-15 RX ADMIN — HYDROMORPHONE HYDROCHLORIDE 1 MILLIGRAM(S): 2 INJECTION INTRAMUSCULAR; INTRAVENOUS; SUBCUTANEOUS at 07:20

## 2018-12-15 RX ADMIN — Medication 0.5 MILLIGRAM(S): at 07:45

## 2018-12-15 RX ADMIN — HYDROMORPHONE HYDROCHLORIDE 1 MILLIGRAM(S): 2 INJECTION INTRAMUSCULAR; INTRAVENOUS; SUBCUTANEOUS at 10:46

## 2018-12-15 RX ADMIN — HYDROMORPHONE HYDROCHLORIDE 1 MILLIGRAM(S): 2 INJECTION INTRAMUSCULAR; INTRAVENOUS; SUBCUTANEOUS at 06:50

## 2018-12-15 RX ADMIN — PANTOPRAZOLE SODIUM 40 MILLIGRAM(S): 20 TABLET, DELAYED RELEASE ORAL at 06:51

## 2018-12-15 RX ADMIN — HYDROMORPHONE HYDROCHLORIDE 1 MILLIGRAM(S): 2 INJECTION INTRAMUSCULAR; INTRAVENOUS; SUBCUTANEOUS at 11:16

## 2018-12-15 RX ADMIN — GABAPENTIN 300 MILLIGRAM(S): 400 CAPSULE ORAL at 06:50

## 2018-12-15 RX ADMIN — OXYCODONE HYDROCHLORIDE 10 MILLIGRAM(S): 5 TABLET ORAL at 07:43

## 2018-12-15 RX ADMIN — Medication 1 PATCH: at 09:49

## 2018-12-15 RX ADMIN — OXYCODONE HYDROCHLORIDE 10 MILLIGRAM(S): 5 TABLET ORAL at 08:13

## 2018-12-15 RX ADMIN — HEPARIN SODIUM 5000 UNIT(S): 5000 INJECTION INTRAVENOUS; SUBCUTANEOUS at 09:46

## 2018-12-15 RX ADMIN — POLYETHYLENE GLYCOL 3350 17 GRAM(S): 17 POWDER, FOR SOLUTION ORAL at 12:03

## 2018-12-15 RX ADMIN — Medication 0.1 MILLIGRAM(S): at 06:50

## 2018-12-15 RX ADMIN — Medication 1 PATCH: at 19:48

## 2018-12-15 NOTE — CHART NOTE - NSCHARTNOTEFT_GEN_A_CORE
At this time patient remains clinically stable.  Patient will likely need chronic suppressive antibiotics for life  Plan for nonoperative management for now secondary to morbidity of such a large surgical procedure unless patient becomes clinically unstable

## 2018-12-16 PROCEDURE — 93971 EXTREMITY STUDY: CPT | Mod: 26

## 2018-12-16 RX ORDER — ACETAMINOPHEN 500 MG
650 TABLET ORAL ONCE
Qty: 0 | Refills: 0 | Status: DISCONTINUED | OUTPATIENT
Start: 2018-12-16 | End: 2018-12-16

## 2018-12-16 RX ORDER — ACETAMINOPHEN 500 MG
1000 TABLET ORAL ONCE
Qty: 0 | Refills: 0 | Status: COMPLETED | OUTPATIENT
Start: 2018-12-16 | End: 2018-12-16

## 2018-12-16 RX ADMIN — HYDROMORPHONE HYDROCHLORIDE 1 MILLIGRAM(S): 2 INJECTION INTRAMUSCULAR; INTRAVENOUS; SUBCUTANEOUS at 13:20

## 2018-12-16 RX ADMIN — Medication 50 MILLIGRAM(S): at 21:22

## 2018-12-16 RX ADMIN — Medication 1 PATCH: at 13:08

## 2018-12-16 RX ADMIN — HYDROMORPHONE HYDROCHLORIDE 1 MILLIGRAM(S): 2 INJECTION INTRAMUSCULAR; INTRAVENOUS; SUBCUTANEOUS at 13:02

## 2018-12-16 RX ADMIN — OXYCODONE HYDROCHLORIDE 10 MILLIGRAM(S): 5 TABLET ORAL at 18:18

## 2018-12-16 RX ADMIN — HYDROMORPHONE HYDROCHLORIDE 1 MILLIGRAM(S): 2 INJECTION INTRAMUSCULAR; INTRAVENOUS; SUBCUTANEOUS at 00:25

## 2018-12-16 RX ADMIN — HYDROMORPHONE HYDROCHLORIDE 1 MILLIGRAM(S): 2 INJECTION INTRAMUSCULAR; INTRAVENOUS; SUBCUTANEOUS at 21:15

## 2018-12-16 RX ADMIN — HEPARIN SODIUM 5000 UNIT(S): 5000 INJECTION INTRAVENOUS; SUBCUTANEOUS at 21:22

## 2018-12-16 RX ADMIN — SODIUM CHLORIDE 70 MILLILITER(S): 9 INJECTION INTRAMUSCULAR; INTRAVENOUS; SUBCUTANEOUS at 18:18

## 2018-12-16 RX ADMIN — LISINOPRIL 10 MILLIGRAM(S): 2.5 TABLET ORAL at 05:24

## 2018-12-16 RX ADMIN — Medication 400 MILLIGRAM(S): at 17:10

## 2018-12-16 RX ADMIN — GABAPENTIN 300 MILLIGRAM(S): 400 CAPSULE ORAL at 06:41

## 2018-12-16 RX ADMIN — Medication 50 MILLIGRAM(S): at 05:24

## 2018-12-16 RX ADMIN — Medication 81 MILLIGRAM(S): at 13:05

## 2018-12-16 RX ADMIN — HYDROMORPHONE HYDROCHLORIDE 1 MILLIGRAM(S): 2 INJECTION INTRAMUSCULAR; INTRAVENOUS; SUBCUTANEOUS at 05:20

## 2018-12-16 RX ADMIN — HYDROMORPHONE HYDROCHLORIDE 1 MILLIGRAM(S): 2 INJECTION INTRAMUSCULAR; INTRAVENOUS; SUBCUTANEOUS at 17:00

## 2018-12-16 RX ADMIN — HYDROMORPHONE HYDROCHLORIDE 1 MILLIGRAM(S): 2 INJECTION INTRAMUSCULAR; INTRAVENOUS; SUBCUTANEOUS at 00:10

## 2018-12-16 RX ADMIN — HYDROMORPHONE HYDROCHLORIDE 1 MILLIGRAM(S): 2 INJECTION INTRAMUSCULAR; INTRAVENOUS; SUBCUTANEOUS at 20:50

## 2018-12-16 RX ADMIN — GABAPENTIN 300 MILLIGRAM(S): 400 CAPSULE ORAL at 00:09

## 2018-12-16 RX ADMIN — SODIUM CHLORIDE 70 MILLILITER(S): 9 INJECTION INTRAMUSCULAR; INTRAVENOUS; SUBCUTANEOUS at 00:09

## 2018-12-16 RX ADMIN — HYDROMORPHONE HYDROCHLORIDE 1 MILLIGRAM(S): 2 INJECTION INTRAMUSCULAR; INTRAVENOUS; SUBCUTANEOUS at 17:15

## 2018-12-16 RX ADMIN — QUETIAPINE FUMARATE 150 MILLIGRAM(S): 200 TABLET, FILM COATED ORAL at 20:08

## 2018-12-16 RX ADMIN — Medication 0.5 MILLIGRAM(S): at 17:27

## 2018-12-16 RX ADMIN — Medication 250 MILLIGRAM(S): at 20:19

## 2018-12-16 RX ADMIN — Medication 1 PATCH: at 10:00

## 2018-12-16 RX ADMIN — Medication 0.1 MILLIGRAM(S): at 05:24

## 2018-12-16 RX ADMIN — Medication 0.1 MILLIGRAM(S): at 17:27

## 2018-12-16 RX ADMIN — Medication 1 PATCH: at 20:08

## 2018-12-16 RX ADMIN — Medication 0.5 MILLIGRAM(S): at 05:23

## 2018-12-16 RX ADMIN — Medication 1 PATCH: at 13:03

## 2018-12-16 RX ADMIN — LOSARTAN POTASSIUM 50 MILLIGRAM(S): 100 TABLET, FILM COATED ORAL at 05:24

## 2018-12-16 RX ADMIN — Medication 50 MILLIGRAM(S): at 16:21

## 2018-12-16 RX ADMIN — HYDROMORPHONE HYDROCHLORIDE 1 MILLIGRAM(S): 2 INJECTION INTRAMUSCULAR; INTRAVENOUS; SUBCUTANEOUS at 04:56

## 2018-12-16 RX ADMIN — PANTOPRAZOLE SODIUM 40 MILLIGRAM(S): 20 TABLET, DELAYED RELEASE ORAL at 06:41

## 2018-12-16 RX ADMIN — HYDROMORPHONE HYDROCHLORIDE 1 MILLIGRAM(S): 2 INJECTION INTRAMUSCULAR; INTRAVENOUS; SUBCUTANEOUS at 09:15

## 2018-12-16 RX ADMIN — HYDROMORPHONE HYDROCHLORIDE 1 MILLIGRAM(S): 2 INJECTION INTRAMUSCULAR; INTRAVENOUS; SUBCUTANEOUS at 08:56

## 2018-12-16 RX ADMIN — OXYCODONE HYDROCHLORIDE 10 MILLIGRAM(S): 5 TABLET ORAL at 06:05

## 2018-12-16 RX ADMIN — Medication 1000 MILLIGRAM(S): at 18:10

## 2018-12-16 RX ADMIN — OXYCODONE HYDROCHLORIDE 10 MILLIGRAM(S): 5 TABLET ORAL at 19:18

## 2018-12-16 RX ADMIN — GABAPENTIN 300 MILLIGRAM(S): 400 CAPSULE ORAL at 21:22

## 2018-12-16 RX ADMIN — OXYCODONE HYDROCHLORIDE 10 MILLIGRAM(S): 5 TABLET ORAL at 05:29

## 2018-12-16 RX ADMIN — AMLODIPINE BESYLATE 10 MILLIGRAM(S): 2.5 TABLET ORAL at 05:25

## 2018-12-16 RX ADMIN — GABAPENTIN 300 MILLIGRAM(S): 400 CAPSULE ORAL at 16:22

## 2018-12-17 LAB
ANION GAP SERPL CALC-SCNC: 12 MMOL/L — SIGNIFICANT CHANGE UP (ref 5–17)
BUN SERPL-MCNC: 21 MG/DL — SIGNIFICANT CHANGE UP (ref 7–23)
CALCIUM SERPL-MCNC: 8.5 MG/DL — SIGNIFICANT CHANGE UP (ref 8.4–10.5)
CHLORIDE SERPL-SCNC: 101 MMOL/L — SIGNIFICANT CHANGE UP (ref 96–108)
CO2 SERPL-SCNC: 26 MMOL/L — SIGNIFICANT CHANGE UP (ref 22–31)
CREAT SERPL-MCNC: 0.57 MG/DL — SIGNIFICANT CHANGE UP (ref 0.5–1.3)
CULTURE RESULTS: SIGNIFICANT CHANGE UP
CULTURE RESULTS: SIGNIFICANT CHANGE UP
GLUCOSE SERPL-MCNC: 84 MG/DL — SIGNIFICANT CHANGE UP (ref 70–99)
HCT VFR BLD CALC: 32.4 % — LOW (ref 34.5–45)
HGB BLD-MCNC: 10.1 G/DL — LOW (ref 11.5–15.5)
MCHC RBC-ENTMCNC: 28 PG — SIGNIFICANT CHANGE UP (ref 27–34)
MCHC RBC-ENTMCNC: 31.2 GM/DL — LOW (ref 32–36)
MCV RBC AUTO: 89.8 FL — SIGNIFICANT CHANGE UP (ref 80–100)
PLATELET # BLD AUTO: 343 K/UL — SIGNIFICANT CHANGE UP (ref 150–400)
POTASSIUM SERPL-MCNC: 4.2 MMOL/L — SIGNIFICANT CHANGE UP (ref 3.5–5.3)
POTASSIUM SERPL-SCNC: 4.2 MMOL/L — SIGNIFICANT CHANGE UP (ref 3.5–5.3)
RBC # BLD: 3.61 M/UL — LOW (ref 3.8–5.2)
RBC # FLD: 17 % — HIGH (ref 10.3–14.5)
SODIUM SERPL-SCNC: 139 MMOL/L — SIGNIFICANT CHANGE UP (ref 135–145)
SPECIMEN SOURCE: SIGNIFICANT CHANGE UP
SPECIMEN SOURCE: SIGNIFICANT CHANGE UP
VANCOMYCIN TROUGH SERPL-MCNC: 9.1 UG/ML — LOW (ref 10–20)
WBC # BLD: 4.91 K/UL — SIGNIFICANT CHANGE UP (ref 3.8–10.5)
WBC # FLD AUTO: 4.91 K/UL — SIGNIFICANT CHANGE UP (ref 3.8–10.5)

## 2018-12-17 PROCEDURE — 99232 SBSQ HOSP IP/OBS MODERATE 35: CPT

## 2018-12-17 RX ADMIN — Medication 50 MILLIGRAM(S): at 21:48

## 2018-12-17 RX ADMIN — Medication 0.1 MILLIGRAM(S): at 17:46

## 2018-12-17 RX ADMIN — LISINOPRIL 10 MILLIGRAM(S): 2.5 TABLET ORAL at 05:23

## 2018-12-17 RX ADMIN — HYDROMORPHONE HYDROCHLORIDE 1 MILLIGRAM(S): 2 INJECTION INTRAMUSCULAR; INTRAVENOUS; SUBCUTANEOUS at 09:19

## 2018-12-17 RX ADMIN — HYDROMORPHONE HYDROCHLORIDE 1 MILLIGRAM(S): 2 INJECTION INTRAMUSCULAR; INTRAVENOUS; SUBCUTANEOUS at 22:10

## 2018-12-17 RX ADMIN — Medication 250 MILLIGRAM(S): at 21:47

## 2018-12-17 RX ADMIN — Medication 1 PATCH: at 11:11

## 2018-12-17 RX ADMIN — Medication 0.5 MILLIGRAM(S): at 05:34

## 2018-12-17 RX ADMIN — GABAPENTIN 300 MILLIGRAM(S): 400 CAPSULE ORAL at 21:56

## 2018-12-17 RX ADMIN — HYDROMORPHONE HYDROCHLORIDE 1 MILLIGRAM(S): 2 INJECTION INTRAMUSCULAR; INTRAVENOUS; SUBCUTANEOUS at 00:48

## 2018-12-17 RX ADMIN — HYDROMORPHONE HYDROCHLORIDE 1 MILLIGRAM(S): 2 INJECTION INTRAMUSCULAR; INTRAVENOUS; SUBCUTANEOUS at 05:20

## 2018-12-17 RX ADMIN — HYDROMORPHONE HYDROCHLORIDE 1 MILLIGRAM(S): 2 INJECTION INTRAMUSCULAR; INTRAVENOUS; SUBCUTANEOUS at 01:15

## 2018-12-17 RX ADMIN — POLYETHYLENE GLYCOL 3350 17 GRAM(S): 17 POWDER, FOR SOLUTION ORAL at 11:15

## 2018-12-17 RX ADMIN — Medication 1 PATCH: at 21:03

## 2018-12-17 RX ADMIN — PANTOPRAZOLE SODIUM 40 MILLIGRAM(S): 20 TABLET, DELAYED RELEASE ORAL at 07:33

## 2018-12-17 RX ADMIN — OXYCODONE HYDROCHLORIDE 10 MILLIGRAM(S): 5 TABLET ORAL at 18:51

## 2018-12-17 RX ADMIN — GABAPENTIN 300 MILLIGRAM(S): 400 CAPSULE ORAL at 13:42

## 2018-12-17 RX ADMIN — HYDROMORPHONE HYDROCHLORIDE 1 MILLIGRAM(S): 2 INJECTION INTRAMUSCULAR; INTRAVENOUS; SUBCUTANEOUS at 05:35

## 2018-12-17 RX ADMIN — OXYCODONE HYDROCHLORIDE 10 MILLIGRAM(S): 5 TABLET ORAL at 06:30

## 2018-12-17 RX ADMIN — LOSARTAN POTASSIUM 50 MILLIGRAM(S): 100 TABLET, FILM COATED ORAL at 05:23

## 2018-12-17 RX ADMIN — Medication 81 MILLIGRAM(S): at 11:11

## 2018-12-17 RX ADMIN — Medication 50 MILLIGRAM(S): at 13:42

## 2018-12-17 RX ADMIN — Medication 1 PATCH: at 07:00

## 2018-12-17 RX ADMIN — HYDROMORPHONE HYDROCHLORIDE 1 MILLIGRAM(S): 2 INJECTION INTRAMUSCULAR; INTRAVENOUS; SUBCUTANEOUS at 21:45

## 2018-12-17 RX ADMIN — HEPARIN SODIUM 5000 UNIT(S): 5000 INJECTION INTRAVENOUS; SUBCUTANEOUS at 09:23

## 2018-12-17 RX ADMIN — OXYCODONE HYDROCHLORIDE 10 MILLIGRAM(S): 5 TABLET ORAL at 18:26

## 2018-12-17 RX ADMIN — HYDROMORPHONE HYDROCHLORIDE 1 MILLIGRAM(S): 2 INJECTION INTRAMUSCULAR; INTRAVENOUS; SUBCUTANEOUS at 09:40

## 2018-12-17 RX ADMIN — HYDROMORPHONE HYDROCHLORIDE 1 MILLIGRAM(S): 2 INJECTION INTRAMUSCULAR; INTRAVENOUS; SUBCUTANEOUS at 18:10

## 2018-12-17 RX ADMIN — HEPARIN SODIUM 5000 UNIT(S): 5000 INJECTION INTRAVENOUS; SUBCUTANEOUS at 21:48

## 2018-12-17 RX ADMIN — Medication 50 MILLIGRAM(S): at 05:23

## 2018-12-17 RX ADMIN — Medication 0.5 MILLIGRAM(S): at 17:49

## 2018-12-17 RX ADMIN — HYDROMORPHONE HYDROCHLORIDE 1 MILLIGRAM(S): 2 INJECTION INTRAMUSCULAR; INTRAVENOUS; SUBCUTANEOUS at 17:46

## 2018-12-17 RX ADMIN — Medication 0.1 MILLIGRAM(S): at 05:23

## 2018-12-17 RX ADMIN — Medication 1 PATCH: at 13:00

## 2018-12-17 RX ADMIN — GABAPENTIN 300 MILLIGRAM(S): 400 CAPSULE ORAL at 05:23

## 2018-12-17 RX ADMIN — QUETIAPINE FUMARATE 150 MILLIGRAM(S): 200 TABLET, FILM COATED ORAL at 20:01

## 2018-12-17 RX ADMIN — OXYCODONE HYDROCHLORIDE 10 MILLIGRAM(S): 5 TABLET ORAL at 06:04

## 2018-12-17 RX ADMIN — AMLODIPINE BESYLATE 10 MILLIGRAM(S): 2.5 TABLET ORAL at 05:23

## 2018-12-17 NOTE — PHARMACOTHERAPY INTERVENTION NOTE - COMMENTS
Patient currently on vancomycin 1000mg IV Q24H for MRSA bacteremia/hip hardware started on 12/12 PM. Vancomycin trough ordered for 12/17/18 prior to 20:00 dose.     Ray Pelaez, PharmD  [phone]

## 2018-12-18 PROCEDURE — 99232 SBSQ HOSP IP/OBS MODERATE 35: CPT

## 2018-12-18 RX ADMIN — HYDROMORPHONE HYDROCHLORIDE 1 MILLIGRAM(S): 2 INJECTION INTRAMUSCULAR; INTRAVENOUS; SUBCUTANEOUS at 02:35

## 2018-12-18 RX ADMIN — HYDROMORPHONE HYDROCHLORIDE 1 MILLIGRAM(S): 2 INJECTION INTRAMUSCULAR; INTRAVENOUS; SUBCUTANEOUS at 14:08

## 2018-12-18 RX ADMIN — Medication 0.1 MILLIGRAM(S): at 06:13

## 2018-12-18 RX ADMIN — HYDROMORPHONE HYDROCHLORIDE 1 MILLIGRAM(S): 2 INJECTION INTRAMUSCULAR; INTRAVENOUS; SUBCUTANEOUS at 02:04

## 2018-12-18 RX ADMIN — Medication 250 MILLIGRAM(S): at 19:50

## 2018-12-18 RX ADMIN — Medication 1 PATCH: at 08:00

## 2018-12-18 RX ADMIN — AMLODIPINE BESYLATE 10 MILLIGRAM(S): 2.5 TABLET ORAL at 06:13

## 2018-12-18 RX ADMIN — GABAPENTIN 300 MILLIGRAM(S): 400 CAPSULE ORAL at 06:13

## 2018-12-18 RX ADMIN — GABAPENTIN 300 MILLIGRAM(S): 400 CAPSULE ORAL at 14:54

## 2018-12-18 RX ADMIN — HYDROMORPHONE HYDROCHLORIDE 1 MILLIGRAM(S): 2 INJECTION INTRAMUSCULAR; INTRAVENOUS; SUBCUTANEOUS at 23:25

## 2018-12-18 RX ADMIN — SODIUM CHLORIDE 70 MILLILITER(S): 9 INJECTION INTRAMUSCULAR; INTRAVENOUS; SUBCUTANEOUS at 10:09

## 2018-12-18 RX ADMIN — LOSARTAN POTASSIUM 50 MILLIGRAM(S): 100 TABLET, FILM COATED ORAL at 06:13

## 2018-12-18 RX ADMIN — Medication 0.5 MILLIGRAM(S): at 06:13

## 2018-12-18 RX ADMIN — Medication 0.1 MILLIGRAM(S): at 18:26

## 2018-12-18 RX ADMIN — SENNA PLUS 2 TABLET(S): 8.6 TABLET ORAL at 23:11

## 2018-12-18 RX ADMIN — HYDROMORPHONE HYDROCHLORIDE 1 MILLIGRAM(S): 2 INJECTION INTRAMUSCULAR; INTRAVENOUS; SUBCUTANEOUS at 14:25

## 2018-12-18 RX ADMIN — OXYCODONE HYDROCHLORIDE 10 MILLIGRAM(S): 5 TABLET ORAL at 20:30

## 2018-12-18 RX ADMIN — OXYCODONE HYDROCHLORIDE 10 MILLIGRAM(S): 5 TABLET ORAL at 06:14

## 2018-12-18 RX ADMIN — Medication 50 MILLIGRAM(S): at 06:13

## 2018-12-18 RX ADMIN — Medication 1 PATCH: at 12:02

## 2018-12-18 RX ADMIN — HYDROMORPHONE HYDROCHLORIDE 1 MILLIGRAM(S): 2 INJECTION INTRAMUSCULAR; INTRAVENOUS; SUBCUTANEOUS at 10:05

## 2018-12-18 RX ADMIN — HYDROMORPHONE HYDROCHLORIDE 1 MILLIGRAM(S): 2 INJECTION INTRAMUSCULAR; INTRAVENOUS; SUBCUTANEOUS at 18:45

## 2018-12-18 RX ADMIN — PANTOPRAZOLE SODIUM 40 MILLIGRAM(S): 20 TABLET, DELAYED RELEASE ORAL at 06:12

## 2018-12-18 RX ADMIN — LISINOPRIL 10 MILLIGRAM(S): 2.5 TABLET ORAL at 06:13

## 2018-12-18 RX ADMIN — Medication 50 MILLIGRAM(S): at 14:54

## 2018-12-18 RX ADMIN — OXYCODONE HYDROCHLORIDE 10 MILLIGRAM(S): 5 TABLET ORAL at 19:43

## 2018-12-18 RX ADMIN — Medication 50 MILLIGRAM(S): at 23:11

## 2018-12-18 RX ADMIN — Medication 1 PATCH: at 19:42

## 2018-12-18 RX ADMIN — Medication 81 MILLIGRAM(S): at 12:02

## 2018-12-18 RX ADMIN — QUETIAPINE FUMARATE 150 MILLIGRAM(S): 200 TABLET, FILM COATED ORAL at 19:53

## 2018-12-18 RX ADMIN — HYDROMORPHONE HYDROCHLORIDE 1 MILLIGRAM(S): 2 INJECTION INTRAMUSCULAR; INTRAVENOUS; SUBCUTANEOUS at 18:30

## 2018-12-18 RX ADMIN — Medication 0.5 MILLIGRAM(S): at 18:26

## 2018-12-18 RX ADMIN — HEPARIN SODIUM 5000 UNIT(S): 5000 INJECTION INTRAVENOUS; SUBCUTANEOUS at 11:05

## 2018-12-18 RX ADMIN — HYDROMORPHONE HYDROCHLORIDE 1 MILLIGRAM(S): 2 INJECTION INTRAMUSCULAR; INTRAVENOUS; SUBCUTANEOUS at 10:20

## 2018-12-18 RX ADMIN — HYDROMORPHONE HYDROCHLORIDE 1 MILLIGRAM(S): 2 INJECTION INTRAMUSCULAR; INTRAVENOUS; SUBCUTANEOUS at 06:10

## 2018-12-18 RX ADMIN — GABAPENTIN 300 MILLIGRAM(S): 400 CAPSULE ORAL at 23:11

## 2018-12-18 RX ADMIN — POLYETHYLENE GLYCOL 3350 17 GRAM(S): 17 POWDER, FOR SOLUTION ORAL at 14:56

## 2018-12-19 LAB
ANION GAP SERPL CALC-SCNC: 13 MMOL/L — SIGNIFICANT CHANGE UP (ref 5–17)
BUN SERPL-MCNC: 24 MG/DL — HIGH (ref 7–23)
CALCIUM SERPL-MCNC: 8.4 MG/DL — SIGNIFICANT CHANGE UP (ref 8.4–10.5)
CHLORIDE SERPL-SCNC: 100 MMOL/L — SIGNIFICANT CHANGE UP (ref 96–108)
CO2 SERPL-SCNC: 25 MMOL/L — SIGNIFICANT CHANGE UP (ref 22–31)
CREAT SERPL-MCNC: 0.71 MG/DL — SIGNIFICANT CHANGE UP (ref 0.5–1.3)
GLUCOSE SERPL-MCNC: 174 MG/DL — HIGH (ref 70–99)
HCT VFR BLD CALC: 31 % — LOW (ref 34.5–45)
HGB BLD-MCNC: 9.3 G/DL — LOW (ref 11.5–15.5)
MCHC RBC-ENTMCNC: 27 PG — SIGNIFICANT CHANGE UP (ref 27–34)
MCHC RBC-ENTMCNC: 30 GM/DL — LOW (ref 32–36)
MCV RBC AUTO: 89.9 FL — SIGNIFICANT CHANGE UP (ref 80–100)
PLATELET # BLD AUTO: 286 K/UL — SIGNIFICANT CHANGE UP (ref 150–400)
POTASSIUM SERPL-MCNC: 4 MMOL/L — SIGNIFICANT CHANGE UP (ref 3.5–5.3)
POTASSIUM SERPL-SCNC: 4 MMOL/L — SIGNIFICANT CHANGE UP (ref 3.5–5.3)
RBC # BLD: 3.45 M/UL — LOW (ref 3.8–5.2)
RBC # FLD: 17 % — HIGH (ref 10.3–14.5)
SODIUM SERPL-SCNC: 138 MMOL/L — SIGNIFICANT CHANGE UP (ref 135–145)
WBC # BLD: 5.12 K/UL — SIGNIFICANT CHANGE UP (ref 3.8–10.5)
WBC # FLD AUTO: 5.12 K/UL — SIGNIFICANT CHANGE UP (ref 3.8–10.5)

## 2018-12-19 PROCEDURE — 99232 SBSQ HOSP IP/OBS MODERATE 35: CPT

## 2018-12-19 RX ORDER — ACETAMINOPHEN 500 MG
650 TABLET ORAL ONCE
Qty: 0 | Refills: 0 | Status: COMPLETED | OUTPATIENT
Start: 2018-12-19 | End: 2018-12-19

## 2018-12-19 RX ADMIN — Medication 1 PATCH: at 12:11

## 2018-12-19 RX ADMIN — Medication 50 MILLIGRAM(S): at 21:17

## 2018-12-19 RX ADMIN — Medication 0.1 MILLIGRAM(S): at 06:37

## 2018-12-19 RX ADMIN — HEPARIN SODIUM 5000 UNIT(S): 5000 INJECTION INTRAVENOUS; SUBCUTANEOUS at 11:28

## 2018-12-19 RX ADMIN — OXYCODONE HYDROCHLORIDE 10 MILLIGRAM(S): 5 TABLET ORAL at 19:36

## 2018-12-19 RX ADMIN — Medication 50 MILLIGRAM(S): at 06:38

## 2018-12-19 RX ADMIN — Medication 1 PATCH: at 07:37

## 2018-12-19 RX ADMIN — Medication 250 MILLIGRAM(S): at 19:52

## 2018-12-19 RX ADMIN — AMLODIPINE BESYLATE 10 MILLIGRAM(S): 2.5 TABLET ORAL at 06:37

## 2018-12-19 RX ADMIN — HYDROMORPHONE HYDROCHLORIDE 1 MILLIGRAM(S): 2 INJECTION INTRAMUSCULAR; INTRAVENOUS; SUBCUTANEOUS at 19:52

## 2018-12-19 RX ADMIN — HYDROMORPHONE HYDROCHLORIDE 1 MILLIGRAM(S): 2 INJECTION INTRAMUSCULAR; INTRAVENOUS; SUBCUTANEOUS at 20:50

## 2018-12-19 RX ADMIN — GABAPENTIN 300 MILLIGRAM(S): 400 CAPSULE ORAL at 21:24

## 2018-12-19 RX ADMIN — POLYETHYLENE GLYCOL 3350 17 GRAM(S): 17 POWDER, FOR SOLUTION ORAL at 11:28

## 2018-12-19 RX ADMIN — LOSARTAN POTASSIUM 50 MILLIGRAM(S): 100 TABLET, FILM COATED ORAL at 06:39

## 2018-12-19 RX ADMIN — HYDROMORPHONE HYDROCHLORIDE 1 MILLIGRAM(S): 2 INJECTION INTRAMUSCULAR; INTRAVENOUS; SUBCUTANEOUS at 11:50

## 2018-12-19 RX ADMIN — QUETIAPINE FUMARATE 150 MILLIGRAM(S): 200 TABLET, FILM COATED ORAL at 21:17

## 2018-12-19 RX ADMIN — PANTOPRAZOLE SODIUM 40 MILLIGRAM(S): 20 TABLET, DELAYED RELEASE ORAL at 06:39

## 2018-12-19 RX ADMIN — Medication 0.5 MILLIGRAM(S): at 08:16

## 2018-12-19 RX ADMIN — HYDROMORPHONE HYDROCHLORIDE 1 MILLIGRAM(S): 2 INJECTION INTRAMUSCULAR; INTRAVENOUS; SUBCUTANEOUS at 00:00

## 2018-12-19 RX ADMIN — HYDROMORPHONE HYDROCHLORIDE 1 MILLIGRAM(S): 2 INJECTION INTRAMUSCULAR; INTRAVENOUS; SUBCUTANEOUS at 15:52

## 2018-12-19 RX ADMIN — LISINOPRIL 10 MILLIGRAM(S): 2.5 TABLET ORAL at 06:39

## 2018-12-19 RX ADMIN — HYDROMORPHONE HYDROCHLORIDE 1 MILLIGRAM(S): 2 INJECTION INTRAMUSCULAR; INTRAVENOUS; SUBCUTANEOUS at 04:00

## 2018-12-19 RX ADMIN — HYDROMORPHONE HYDROCHLORIDE 1 MILLIGRAM(S): 2 INJECTION INTRAMUSCULAR; INTRAVENOUS; SUBCUTANEOUS at 08:45

## 2018-12-19 RX ADMIN — Medication 50 MILLIGRAM(S): at 13:42

## 2018-12-19 RX ADMIN — HYDROMORPHONE HYDROCHLORIDE 1 MILLIGRAM(S): 2 INJECTION INTRAMUSCULAR; INTRAVENOUS; SUBCUTANEOUS at 03:33

## 2018-12-19 RX ADMIN — Medication 0.5 MILLIGRAM(S): at 18:00

## 2018-12-19 RX ADMIN — GABAPENTIN 300 MILLIGRAM(S): 400 CAPSULE ORAL at 06:37

## 2018-12-19 RX ADMIN — Medication 1 PATCH: at 11:27

## 2018-12-19 RX ADMIN — Medication 0.1 MILLIGRAM(S): at 18:00

## 2018-12-19 RX ADMIN — HYDROMORPHONE HYDROCHLORIDE 1 MILLIGRAM(S): 2 INJECTION INTRAMUSCULAR; INTRAVENOUS; SUBCUTANEOUS at 07:31

## 2018-12-19 RX ADMIN — Medication 1 PATCH: at 19:33

## 2018-12-19 RX ADMIN — OXYCODONE HYDROCHLORIDE 10 MILLIGRAM(S): 5 TABLET ORAL at 18:00

## 2018-12-19 RX ADMIN — GABAPENTIN 300 MILLIGRAM(S): 400 CAPSULE ORAL at 13:43

## 2018-12-19 RX ADMIN — Medication 81 MILLIGRAM(S): at 11:28

## 2018-12-19 RX ADMIN — HYDROMORPHONE HYDROCHLORIDE 1 MILLIGRAM(S): 2 INJECTION INTRAMUSCULAR; INTRAVENOUS; SUBCUTANEOUS at 12:20

## 2018-12-19 RX ADMIN — SODIUM CHLORIDE 70 MILLILITER(S): 9 INJECTION INTRAMUSCULAR; INTRAVENOUS; SUBCUTANEOUS at 21:20

## 2018-12-19 RX ADMIN — OXYCODONE HYDROCHLORIDE 10 MILLIGRAM(S): 5 TABLET ORAL at 06:34

## 2018-12-19 NOTE — DIETITIAN INITIAL EVALUATION ADULT. - ENERGY NEEDS
Ht: 62 inches Wt stated: 136 pounds BMI: 24.8 kg/m2 IBW: 110 (+/-10%) 123.6 %IBW  Pertinent information: Pt 44 y/o F with PMH: seizure disorder, ETOH abuse, CAD with stent, S/P total hip replacement with complicated course requiring PICC, anxiety, HTN, HLD, migraine, admitted with worsening pain, redness, swelling of right hip, found with cellulitis, infected leg.   Noted +2 right leg edema as per flow sheets. Skin: wound in right hip; pressure ulcers in sacrum and right heel stage 1 as per documentation.

## 2018-12-19 NOTE — DIETITIAN INITIAL EVALUATION ADULT. - NS AS NUTRI INTERV MEALS SNACK
Recommend continue regular diet. Encourage PO intake, obtain food preferences, provide feeding assistance as needed.

## 2018-12-19 NOTE — DIETITIAN INITIAL EVALUATION ADULT. - ADHERENCE
Pt reports not following any type of diet or restriction at home. Pt reports not taking any vitamins or nutritional supplements PTA.

## 2018-12-19 NOTE — DIETITIAN INITIAL EVALUATION ADULT. - NS AS NUTRI INTERV ED CONTENT
Stressed the importance of protein intake to help with healing, provided recommendations to increase PO and protein intake, recommended small frequent meals with protein to optimize intake in case of decreased appetite, reviewed menu order procedures in hospital, obtained food preferences. Stressed the importance of protein intake to help with healing, provided recommendations to increase PO and protein intake, recommended small frequent meals with protein to optimize intake in case of decreased appetite, reviewed menu order procedures in hospital, obtained food preferences. Provided brief education on heart healthy nutrition therapy. Recommended limited salt intake. Reviewed foods high in salt and amount of salt recommended per day. Discussed nutrition label reading. Stressed the importance of limited fried foods and saturated fat consumption. Pt amenable for education, noted needs reinforcement. RD remains available.

## 2018-12-19 NOTE — DIETITIAN INITIAL EVALUATION ADULT. - OTHER INFO
Pt seen for length of stay initial assessment. Pt reports good appetite and PO intake. Noted 100% PO intake as per flow sheets and lunch tray at bedside, pt requests double portions, states "I am very hungry". Pt with dentures as per chart, reports they fit well, denies difficulty chewing/swallowing. Pt denies nausea, vomiting, diarrhea, or constipation, reports last BM yesterday (12/18) - pt on bowel regimen as per chart.

## 2018-12-19 NOTE — DIETITIAN INITIAL EVALUATION ADULT. - NS FNS WEIGHT CHANGE REASON
Pt reports gradual weight gain x 6 months PTA from 124 to 136 pounds due to "walking less". Weight as per previous RD notes (05/22/2018) 114 pounds -> (08/31/2018) 123.4 pounds. Weight as per flow sheets (12/13) 147.2 pounds -?accuracy of weight fluctuations likely due to fluid shifts.

## 2018-12-20 VITALS
DIASTOLIC BLOOD PRESSURE: 84 MMHG | OXYGEN SATURATION: 100 % | TEMPERATURE: 98 F | HEART RATE: 72 BPM | SYSTOLIC BLOOD PRESSURE: 134 MMHG | RESPIRATION RATE: 18 BRPM

## 2018-12-20 LAB
HCT VFR BLD CALC: 31 % — LOW (ref 34.5–45)
HGB BLD-MCNC: 9.7 G/DL — LOW (ref 11.5–15.5)
MCHC RBC-ENTMCNC: 27.6 PG — SIGNIFICANT CHANGE UP (ref 27–34)
MCHC RBC-ENTMCNC: 31.3 GM/DL — LOW (ref 32–36)
MCV RBC AUTO: 88.1 FL — SIGNIFICANT CHANGE UP (ref 80–100)
PLATELET # BLD AUTO: 271 K/UL — SIGNIFICANT CHANGE UP (ref 150–400)
RBC # BLD: 3.52 M/UL — LOW (ref 3.8–5.2)
RBC # FLD: 16.9 % — HIGH (ref 10.3–14.5)
WBC # BLD: 5 K/UL — SIGNIFICANT CHANGE UP (ref 3.8–10.5)
WBC # FLD AUTO: 5 K/UL — SIGNIFICANT CHANGE UP (ref 3.8–10.5)

## 2018-12-20 PROCEDURE — 96374 THER/PROPH/DIAG INJ IV PUSH: CPT | Mod: XU

## 2018-12-20 PROCEDURE — 82330 ASSAY OF CALCIUM: CPT

## 2018-12-20 PROCEDURE — 84295 ASSAY OF SERUM SODIUM: CPT

## 2018-12-20 PROCEDURE — 85652 RBC SED RATE AUTOMATED: CPT

## 2018-12-20 PROCEDURE — 93005 ELECTROCARDIOGRAM TRACING: CPT

## 2018-12-20 PROCEDURE — 99285 EMERGENCY DEPT VISIT HI MDM: CPT | Mod: 25

## 2018-12-20 PROCEDURE — 73502 X-RAY EXAM HIP UNI 2-3 VIEWS: CPT

## 2018-12-20 PROCEDURE — 73564 X-RAY EXAM KNEE 4 OR MORE: CPT

## 2018-12-20 PROCEDURE — 85027 COMPLETE CBC AUTOMATED: CPT

## 2018-12-20 PROCEDURE — 80202 ASSAY OF VANCOMYCIN: CPT

## 2018-12-20 PROCEDURE — 96375 TX/PRO/DX INJ NEW DRUG ADDON: CPT | Mod: XU

## 2018-12-20 PROCEDURE — 82435 ASSAY OF BLOOD CHLORIDE: CPT

## 2018-12-20 PROCEDURE — 80307 DRUG TEST PRSMV CHEM ANLYZR: CPT

## 2018-12-20 PROCEDURE — 36000 PLACE NEEDLE IN VEIN: CPT | Mod: XU

## 2018-12-20 PROCEDURE — 93971 EXTREMITY STUDY: CPT

## 2018-12-20 PROCEDURE — 80053 COMPREHEN METABOLIC PANEL: CPT

## 2018-12-20 PROCEDURE — 73552 X-RAY EXAM OF FEMUR 2/>: CPT

## 2018-12-20 PROCEDURE — 82803 BLOOD GASES ANY COMBINATION: CPT

## 2018-12-20 PROCEDURE — 85014 HEMATOCRIT: CPT

## 2018-12-20 PROCEDURE — 94640 AIRWAY INHALATION TREATMENT: CPT

## 2018-12-20 PROCEDURE — 82947 ASSAY GLUCOSE BLOOD QUANT: CPT

## 2018-12-20 PROCEDURE — 84132 ASSAY OF SERUM POTASSIUM: CPT

## 2018-12-20 PROCEDURE — 72193 CT PELVIS W/DYE: CPT

## 2018-12-20 PROCEDURE — 99232 SBSQ HOSP IP/OBS MODERATE 35: CPT

## 2018-12-20 PROCEDURE — 97161 PT EVAL LOW COMPLEX 20 MIN: CPT

## 2018-12-20 PROCEDURE — 72170 X-RAY EXAM OF PELVIS: CPT

## 2018-12-20 PROCEDURE — 86140 C-REACTIVE PROTEIN: CPT

## 2018-12-20 PROCEDURE — 80048 BASIC METABOLIC PNL TOTAL CA: CPT

## 2018-12-20 PROCEDURE — 71045 X-RAY EXAM CHEST 1 VIEW: CPT

## 2018-12-20 PROCEDURE — 76937 US GUIDE VASCULAR ACCESS: CPT

## 2018-12-20 PROCEDURE — 87040 BLOOD CULTURE FOR BACTERIA: CPT

## 2018-12-20 PROCEDURE — 83605 ASSAY OF LACTIC ACID: CPT

## 2018-12-20 RX ORDER — VANCOMYCIN HCL 1 G
1000 VIAL (EA) INTRAVENOUS EVERY 12 HOURS
Qty: 0 | Refills: 0 | Status: DISCONTINUED | OUTPATIENT
Start: 2018-12-20 | End: 2018-12-20

## 2018-12-20 RX ORDER — OXYCODONE HYDROCHLORIDE 5 MG/1
10 TABLET ORAL ONCE
Qty: 0 | Refills: 0 | Status: DISCONTINUED | OUTPATIENT
Start: 2018-12-20 | End: 2018-12-20

## 2018-12-20 RX ORDER — POLYETHYLENE GLYCOL 3350 17 G/17G
17 POWDER, FOR SOLUTION ORAL
Qty: 0 | Refills: 0 | COMMUNITY
Start: 2018-12-20

## 2018-12-20 RX ORDER — MORPHINE SULFATE 50 MG/1
15 CAPSULE, EXTENDED RELEASE ORAL EVERY 12 HOURS
Qty: 0 | Refills: 0 | Status: DISCONTINUED | OUTPATIENT
Start: 2018-12-20 | End: 2018-12-20

## 2018-12-20 RX ORDER — MORPHINE SULFATE 50 MG/1
1 CAPSULE, EXTENDED RELEASE ORAL
Qty: 6 | Refills: 0 | OUTPATIENT
Start: 2018-12-20 | End: 2018-12-22

## 2018-12-20 RX ORDER — OXYCODONE HYDROCHLORIDE 5 MG/1
10 TABLET ORAL EVERY 12 HOURS
Qty: 0 | Refills: 0 | Status: DISCONTINUED | OUTPATIENT
Start: 2018-12-20 | End: 2018-12-20

## 2018-12-20 RX ORDER — OXYCODONE HYDROCHLORIDE 5 MG/1
1 TABLET ORAL
Qty: 10 | Refills: 0 | OUTPATIENT
Start: 2018-12-20 | End: 2018-12-24

## 2018-12-20 RX ORDER — BUDESONIDE, MICRONIZED 100 %
0.5 POWDER (GRAM) MISCELLANEOUS
Qty: 1 | Refills: 0 | OUTPATIENT
Start: 2018-12-20 | End: 2019-01-18

## 2018-12-20 RX ADMIN — HYDROMORPHONE HYDROCHLORIDE 1 MILLIGRAM(S): 2 INJECTION INTRAMUSCULAR; INTRAVENOUS; SUBCUTANEOUS at 06:01

## 2018-12-20 RX ADMIN — HYDROMORPHONE HYDROCHLORIDE 1 MILLIGRAM(S): 2 INJECTION INTRAMUSCULAR; INTRAVENOUS; SUBCUTANEOUS at 10:06

## 2018-12-20 RX ADMIN — PANTOPRAZOLE SODIUM 40 MILLIGRAM(S): 20 TABLET, DELAYED RELEASE ORAL at 06:06

## 2018-12-20 RX ADMIN — HYDROMORPHONE HYDROCHLORIDE 1 MILLIGRAM(S): 2 INJECTION INTRAMUSCULAR; INTRAVENOUS; SUBCUTANEOUS at 10:36

## 2018-12-20 RX ADMIN — Medication 1 PATCH: at 11:27

## 2018-12-20 RX ADMIN — Medication 0.1 MILLIGRAM(S): at 06:06

## 2018-12-20 RX ADMIN — HYDROMORPHONE HYDROCHLORIDE 1 MILLIGRAM(S): 2 INJECTION INTRAMUSCULAR; INTRAVENOUS; SUBCUTANEOUS at 07:05

## 2018-12-20 RX ADMIN — AMLODIPINE BESYLATE 10 MILLIGRAM(S): 2.5 TABLET ORAL at 06:06

## 2018-12-20 RX ADMIN — Medication 50 MILLIGRAM(S): at 06:06

## 2018-12-20 RX ADMIN — Medication 1 PATCH: at 08:13

## 2018-12-20 RX ADMIN — HYDROMORPHONE HYDROCHLORIDE 1 MILLIGRAM(S): 2 INJECTION INTRAMUSCULAR; INTRAVENOUS; SUBCUTANEOUS at 00:02

## 2018-12-20 RX ADMIN — HYDROMORPHONE HYDROCHLORIDE 1 MILLIGRAM(S): 2 INJECTION INTRAMUSCULAR; INTRAVENOUS; SUBCUTANEOUS at 14:02

## 2018-12-20 RX ADMIN — HYDROMORPHONE HYDROCHLORIDE 1 MILLIGRAM(S): 2 INJECTION INTRAMUSCULAR; INTRAVENOUS; SUBCUTANEOUS at 01:00

## 2018-12-20 RX ADMIN — GABAPENTIN 300 MILLIGRAM(S): 400 CAPSULE ORAL at 06:16

## 2018-12-20 RX ADMIN — Medication 81 MILLIGRAM(S): at 12:54

## 2018-12-20 RX ADMIN — Medication 1 PATCH: at 12:53

## 2018-12-20 RX ADMIN — Medication 50 MILLIGRAM(S): at 12:54

## 2018-12-20 RX ADMIN — LOSARTAN POTASSIUM 50 MILLIGRAM(S): 100 TABLET, FILM COATED ORAL at 06:06

## 2018-12-20 RX ADMIN — Medication 0.5 MILLIGRAM(S): at 06:06

## 2018-12-20 RX ADMIN — OXYCODONE HYDROCHLORIDE 10 MILLIGRAM(S): 5 TABLET ORAL at 11:15

## 2018-12-20 RX ADMIN — HEPARIN SODIUM 5000 UNIT(S): 5000 INJECTION INTRAVENOUS; SUBCUTANEOUS at 12:54

## 2018-12-20 RX ADMIN — LISINOPRIL 10 MILLIGRAM(S): 2.5 TABLET ORAL at 06:06

## 2018-12-20 RX ADMIN — GABAPENTIN 300 MILLIGRAM(S): 400 CAPSULE ORAL at 12:53

## 2018-12-20 RX ADMIN — OXYCODONE HYDROCHLORIDE 10 MILLIGRAM(S): 5 TABLET ORAL at 10:45

## 2018-12-20 NOTE — PROGRESS NOTE ADULT - PROVIDER SPECIALTY LIST ADULT
Infectious Disease
Internal Medicine

## 2018-12-20 NOTE — PROGRESS NOTE ADULT - ASSESSMENT
pt w/ r hip sx / now w/ redness/ likely cellulitis  likely  infected hardware  id eval noted  iv abs per ID  ortho eval noted  however life long suppression also not an option per ID  will transition to PO abx for now and have her f/u ortho as outpt  dvt proph  analgesics prn  cont outp t meds  RLE US - no DVT    d/c planning on PO abx per ID, outpt Ortho f/u
pt w/ r hip sx / now w/ redness/ likely cellulitis  likely  infected hardware  id eval noted  iv abs per ID  ortho eval initially  ortho reassessed for hardware removal and state no sx  dvt proph  analgesics prn  cont outp t meds  check lower ext r / doppler
pt w/ r hip sx / now w/ redness/ likely cellulitis  likely  infected hardware  id eval noted  iv abs per ID  ortho eval initially  will need to reassess as infection will not clear without hardware removal   dvt proph  analgesics prn  cont outp t meds
pt w/ r hip sx / now w/ redness/ likely cellulitis  likely  infected hardware  id eval noted  iv abs per ID  ortho eval noted  however life long suppression also not an option per ID  await Ortho to comment  dvt proph  analgesics prn  cont outp t meds  RLE US - no DVT
pt w/ r hip sx / now w/ redness/ likely cellulitis  likely  infected hardware  id eval noted  iv abs per ID  ortho eval noted  however life long suppression also not an option per ID  await Ortho to comment  dvt proph  analgesics prn  cont outp t meds  RLE US - no DVT
pt w/ r hip sx / now w/ redness/ likely cellulitis  likely  infected hardware  id eval noted  iv abs per ID  ortho eval noted  however life long suppression also not an option per ID  likely d/c on IV Vanco with eventual ortho intervention at later date  dvt proph  analgesics prn  cont outp t meds  RLE US - no DVT    d/c planning w/IV abx if no intervention from Ortho this admission
pt w/ r hip sx / now w/ reness/ likely cellulitis  r/o infected hardware  id eval noted  iv abs per ID  ortho eval noted  dvt proph  analgesics  cont outp t meds  DVT prophylaxis
pt w/ r hip sx / now w/ reness/ likely cellulitis  r/o infected hardware  id eval noted  iv abs per ID  ortho eval noted  dvt proph  analgesics  cont outp t meds  DVT prophylaxis
pt with multiple comorbidities was hospitalized forMRSA bacteremia and an infected revision of a THP ( 5/18).  received an intraop debridement and a prolonged course of IV antibiotics.  Not sure when antibiotics stopped but has redness induration and pain over the proximal femur and hip. 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 With MRSA, we are virtually never able to cure the infection without removal.  She will likely need a girdlestone and ability to reimplant is not at all certain.  continue vanco  ct of the hip  reviewed       discussed with ortho attending Dr. Sprague who is reviewing case with colleagues    They have agreed that hardware will need to come out but want to wait to see if she  can get union of t he femur. In the meantime, we can either discharge on IV vanco again or try po doxycycline  She is on seroquel and I am reluctant to use zyvoxx.     She is very upset and I am not sure what she wants to do.
pt with multiple comorbidities was hospitalized forMRSA bacteremia and an infected revision of a THP ( 5/18).  received an intraop debridement and a prolonged course of IV antibiotics.  Not sure when antibiotics stopped but has redness induration and pain over the proximal femur and hip. 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 With MRSA, we are virtually never able to cure the infection without removal.  She will likely need a girdlestone and ability to reimplant is not at all certain.  continue vanco  ct of the hip  reviewed    ortho follow up. Ortho not excited about hardware REMOVAL BUT SUPPRESSION I does not seem to be a good option
pt with multiple comorbidities was hospitalized forMRSA bacteremia and an infected revision of a THP ( 5/18).  received an intraop debridement and a prolonged course of IV antibiotics.  Not sure when antibiotics stopped but has redness induration and pain over the proximal femur and hip. 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 With MRSA, we are virtually never able to cure the infection without removal.  She will likely need a girdlestone and ability to reimplant is not at all certain.  continue vanco  ct of the hip  reviewed    ortho follow up. Ortho not excited about hardware REMOVAL BUT SUPPRESSION I does not seem to be a good option     discussed with ortho  pt  suppression is not a long term solution with hx of mrsa bacteremia
pt with multiple comorbidities was hospitalized forMRSA bacteremia and an infected revision of a THP ( 5/18).  received an intraop debridement and a prolonged course of IV antibiotics.  Not sure when antibiotics stopped but has redness induration and pain over the proximal femur and hip. 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 With MRSA, we are virtually never able to cure the infection without removal.  She will likely need a girdlestone and ability to reimplant is not at all certain.  continue vanco  ct of the hip  reviewed    ortho follow up. Ortho not excited about hardware REMOVAL BUT SUPPRESSION I does not seem to be a good option     discussed with ortho  pt  suppression is not a long term solution with hx of mrsa bacteremia   adjust vanco dose after levels back.  ortho follow up pending
pt with multiple comorbidities was hospitalized forMRSA bacteremia and an infected revision of a THP ( 5/18).  received an intraop debridement and a prolonged course of IV antibiotics.  Not sure when antibiotics stopped but has redness induration and pain over the proximal femur and hip. 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 With MRSA, we are virtually never able to cure the infection without removal.  She will likely need a girdlestone and ability to reimplant is not at all certain.  continue vanco  ct of the hip  reviewed    ortho follow up. Ortho not excited about hardware REMOVAL BUT SUPPRESSION I does not seem to be a good option     discussed with ortho  pt  suppression is not a long term solution with hx of mrsa bacteremia   discussed with ortho attending Dr. Sprague who is reviewing case with colleagues   we can probably send home for the holiday, but at some point this is going to have to be dealt with.  no change in vanco dose at present  ( not curative)

## 2018-12-20 NOTE — PHYSICAL THERAPY INITIAL EVALUATION ADULT - PERTINENT HX OF CURRENT PROBLEM, REHAB EVAL
Pt is a 74 y/o female admitted to [facility] on 12/12/18 h/o seizure disorder, EtOH abuse, CAD with stent, s/p R total hip replacement and revision in August 2018 with complicated course requiring PICC line and abx while in rehab now presents with worsening pain, redness, swelling of R hip a/w difficulty ambulating.  (-) [facility] Dup[facility]

## 2018-12-20 NOTE — PHYSICAL THERAPY INITIAL EVALUATION ADULT - PRECAUTIONS/LIMITATIONS, REHAB EVAL
XR R hip: Status post right total hip revision arthroplasty with 2 acetabular screws and long stem femoral component with cerclage wires. Additional long lateral side plate with multiple fixation screws are visualized. The prosthesis is intact and the appearance is similar to prior x-ray on 8/20/2018. Heterotopic bone formation/callus along the hardware in the proximal and  mid femur that is more pronounced than the previous study.

## 2018-12-20 NOTE — PHYSICAL THERAPY INITIAL EVALUATION ADULT - ADDITIONAL COMMENTS
[place] with a few steps to enter and ~ 6 steps inside. Pt was Ind with all ADLs and amb with RW.

## 2018-12-20 NOTE — PROGRESS NOTE ADULT - SUBJECTIVE AND OBJECTIVE BOX
CHIEF COMPLAINT:Patient is a 73y old  Female who presents with a chief complaint of R hip pain (14 Dec 2018 16:54)  no acute events      PAST MEDICAL & SURGICAL HISTORY:  Pain of right hip joint  Migraine  Alcohol abuse  Seizure  Stented coronary artery  Coronary artery disease  Anxiety  HTN (hypertension)  Emphysema, unspecified  Anxiety  Migraine  CAD (coronary artery disease)  Hyperlipidemia  Hypertension  Status post total hip replacement, right: 5/21/18  S/P bladder repair          REVIEW OF SYSTEMS:  CONSTITUTIONAL: No fever, weight loss, or fatigue  EYES: No eye pain, visual disturbances, or discharge  NECK: No pain or stiffness  RESPIRATORY: No cough, wheezing, chills or hemoptysis; No Shortness of Breath  CARDIOVASCULAR: No chest pain, palpitations, passing out, dizziness,   GASTROINTESTINAL: No abdominal or epigastric pain. No nausea, vomiting, or hematemesis; No diarrhea or constipation. No melena or hematochezia.  GENITOURINARY: No dysuria, frequency, hematuria, or incontinence  NEUROLOGICAL: No headaches, memory loss, loss of strength, numbness, or tremors  c/o r hip discomfort   swelling      Medications:  MEDICATIONS  (STANDING):  amLODIPine   Tablet 10 milliGRAM(s) Oral daily  aspirin enteric coated 81 milliGRAM(s) Oral daily  buDESOnide   0.5 milliGRAM(s) Respule 0.5 milliGRAM(s) Inhalation two times a day  cloNIDine 0.1 milliGRAM(s) Oral two times a day  gabapentin 300 milliGRAM(s) Oral three times a day  heparin  Injectable 5000 Unit(s) SubCutaneous every 12 hours  hydrALAZINE 50 milliGRAM(s) Oral every 8 hours  lisinopril 10 milliGRAM(s) Oral daily  losartan 50 milliGRAM(s) Oral daily  nicotine - 21 mG/24Hr(s) Patch 1 patch Transdermal daily  oxyCODONE  ER Tablet 10 milliGRAM(s) Oral every 12 hours  pantoprazole    Tablet 40 milliGRAM(s) Oral before breakfast  polyethylene glycol 3350 17 Gram(s) Oral daily  QUEtiapine 150 milliGRAM(s) Oral at bedtime  senna 2 Tablet(s) Oral at bedtime  sodium chloride 0.9%. 1000 milliLiter(s) (70 mL/Hr) IV Continuous <Continuous>  vancomycin  IVPB 1000 milliGRAM(s) IV Intermittent every 12 hours    MEDICATIONS  (PRN):  HYDROmorphone  Injectable 1 milliGRAM(s) IV Push every 4 hours PRN Severe Pain (7 - 10)    	    PHYSICAL EXAM:  T(C): 36.4 (12-20-18 @ 11:31), Max: 37 (12-19-18 @ 21:23)  HR: 72 (12-20-18 @ 11:31) (69 - 78)  BP: 134/84 (12-20-18 @ 11:31) (118/68 - 134/84)  RR: 18 (12-20-18 @ 11:31) (18 - 18)  SpO2: 100% (12-20-18 @ 11:31) (96% - 100%)  Wt(kg): --  I&O's Summary    19 Dec 2018 07:01  -  20 Dec 2018 07:00  --------------------------------------------------------  IN: 800 mL / OUT: 0 mL / NET: 800 mL      Appearance: Normal	  HEENT:   Normal oral mucosa, PERRL, EOMI	  Lymphatic: No lymphadenopathy  Cardiovascular: Normal S1 S2, No JVD, No murmurs,  Respiratory: Lungs clear to auscultation	  Psychiatry: A & O   Gastrointestinal:  Soft, Non-tender, + BS	  Skin: No rashes, No ecchymoses, No cyanosis	  Neurologic: Non-focal  Extremities: r hip / thigh swelling    Vascular: Peripheral pulses palpable 2+ bilaterally    LABS:	 	    CARDIAC MARKERS:                                9.7    5.00  )-----------( 271      ( 20 Dec 2018 08:10 )             31.0     12-19    138  |  100  |  24<H>  ----------------------------<  174<H>  4.0   |  25  |  0.71    Ca    8.4      19 Dec 2018 05:50      proBNP:   Lipid Profile:   HgA1c:   TSH:
CHIEF COMPLAINT: Patient is a 73y old  Female who presents with a chief complaint of hip pain (13 Dec 2018 09:06)      Allergies:  No Known Allergies      PAST MEDICAL & SURGICAL HISTORY:  Pain of right hip joint  Migraine  Alcohol abuse  Seizure  Stented coronary artery  Coronary artery disease  Anxiety  HTN (hypertension)  Emphysema, unspecified  Anxiety  Migraine  CAD (coronary artery disease)  Hyperlipidemia  Hypertension  Status post total hip replacement, right: 5/21/18  S/P bladder repair      FAMILY HISTORY:  Family history of coronary artery disease in daughter (Child)      REVIEW OF SYSTEMS:  CONSTITUTIONAL: No fever, weight loss, or fatigue  EYES: No eye pain, visual disturbances, or discharge  NECK: No pain or stiffness  RESPIRATORY: No cough or wheezing, no shortness of breath  CARDIOVASCULAR: No chest pain, palpitations, dizziness, or leg swelling  GASTROINTESTINAL: No abdominal or epigastric pain. No nausea, vomiting, diarrhea or constipation  GENITOURINARY: No dysuria, urinary frequency or urgency, no hematuria  NEUROLOGICAL: No headaches, memory loss, loss of strength, numbness, or tremors  SKIN: No itching, burning, rashes, or lesions   MUSCULOSKELETAL: R hip pain    Medications:  MEDICATIONS  (STANDING):  amLODIPine   Tablet 10 milliGRAM(s) Oral daily  aspirin enteric coated 81 milliGRAM(s) Oral daily  buDESOnide   0.5 milliGRAM(s) Respule 0.5 milliGRAM(s) Inhalation two times a day  cloNIDine 0.1 milliGRAM(s) Oral two times a day  gabapentin 300 milliGRAM(s) Oral three times a day  heparin  Injectable 5000 Unit(s) SubCutaneous every 12 hours  hydrALAZINE 50 milliGRAM(s) Oral every 8 hours  lisinopril 10 milliGRAM(s) Oral daily  losartan 50 milliGRAM(s) Oral daily  nicotine - 21 mG/24Hr(s) Patch 1 patch Transdermal daily  oxyCODONE  ER Tablet 10 milliGRAM(s) Oral every 12 hours  pantoprazole    Tablet 40 milliGRAM(s) Oral before breakfast  polyethylene glycol 3350 17 Gram(s) Oral daily  QUEtiapine 150 milliGRAM(s) Oral at bedtime  senna 2 Tablet(s) Oral at bedtime  sodium chloride 0.9%. 1000 milliLiter(s) (70 mL/Hr) IV Continuous <Continuous>  vancomycin  IVPB 1000 milliGRAM(s) IV Intermittent every 24 hours    MEDICATIONS  (PRN):  HYDROmorphone  Injectable 1 milliGRAM(s) IV Push every 4 hours PRN Severe Pain (7 - 10)    	    PHYSICAL EXAM:  T(C): 36.3 (12-13-18 @ 15:35), Max: 36.9 (12-13-18 @ 05:06)  HR: 86 (12-13-18 @ 15:35) (79 - 91)  BP: 123/68 (12-13-18 @ 15:35) (107/64 - 149/71)  RR: 18 (12-13-18 @ 15:35) (18 - 20)  SpO2: 97% (12-13-18 @ 15:35) (95% - 97%)  Wt(kg): --  I&O's Summary    13 Dec 2018 07:01  -  13 Dec 2018 15:46  --------------------------------------------------------  IN: 0 mL / OUT: 600 mL / NET: -600 mL        Appearance: Normal	  HEENT:   NCAT, PERRL, EOMI	  Lymphatic: No lymphadenopathy  Cardiovascular: Normal S1 S2, RRR  Respiratory: Lungs clear to auscultation BL  Psychiatry: A & O x 3, Mood & affect appropriate  Gastrointestinal:  Soft, Non-tender, + BS  Skin: No rashes, No ecchymoses, No cyanosis	  Neurologic: Non-focal  Extremities: dec ROM, tenderness, erythema R hip    	  LABS:	 	    CARDIAC MARKERS:                                9.3    5.65  )-----------( 361      ( 13 Dec 2018 07:29 )             28.9     12-13    141  |  112<H>  |  20  ----------------------------<  104<H>  4.0   |  18<L>  |  0.68    Ca    8.3<L>      13 Dec 2018 06:21    TPro  7.6  /  Alb  3.0<L>  /  TBili  0.1<L>  /  DBili  x   /  AST  39  /  ALT  18  /  AlkPhos  120  12-12    proBNP:   Lipid Profile:   HgA1c:   TSH:
CHIEF COMPLAINT: Patient is a 73y old  Female who presents with a chief complaint of hip pain (13 Dec 2018 09:06)  no acute events    Allergies:  No Known Allergies      PAST MEDICAL & SURGICAL HISTORY:  Pain of right hip joint  Migraine  Alcohol abuse  Seizure  Stented coronary artery  Coronary artery disease  Anxiety  HTN (hypertension)  Emphysema, unspecified  Anxiety  Migraine  CAD (coronary artery disease)  Hyperlipidemia  Hypertension  Status post total hip replacement, right: 5/21/18  S/P bladder repair      FAMILY HISTORY:  Family history of coronary artery disease in daughter (Child)      REVIEW OF SYSTEMS:  CONSTITUTIONAL: No fever, weight loss, or fatigue  EYES: No eye pain, visual disturbances, or discharge  NECK: No pain or stiffness  RESPIRATORY: No cough or wheezing, no shortness of breath  CARDIOVASCULAR: No chest pain, palpitations, dizziness, or leg swelling  GASTROINTESTINAL: No abdominal or epigastric pain. No nausea, vomiting, diarrhea or constipation  GENITOURINARY: No dysuria, urinary frequency or urgency, no hematuria  NEUROLOGICAL: No headaches, memory loss, loss of strength, numbness, or tremors  SKIN: No itching, burning, rashes, or lesions   MUSCULOSKELETAL: R hip pain      Medications:  MEDICATIONS  (STANDING):  acetaminophen  IVPB .. 1000 milliGRAM(s) IV Intermittent once  amLODIPine   Tablet 10 milliGRAM(s) Oral daily  aspirin enteric coated 81 milliGRAM(s) Oral daily  buDESOnide   0.5 milliGRAM(s) Respule 0.5 milliGRAM(s) Inhalation two times a day  cloNIDine 0.1 milliGRAM(s) Oral two times a day  gabapentin 300 milliGRAM(s) Oral three times a day  heparin  Injectable 5000 Unit(s) SubCutaneous every 12 hours  hydrALAZINE 50 milliGRAM(s) Oral every 8 hours  lisinopril 10 milliGRAM(s) Oral daily  losartan 50 milliGRAM(s) Oral daily  nicotine - 21 mG/24Hr(s) Patch 1 patch Transdermal daily  oxyCODONE  ER Tablet 10 milliGRAM(s) Oral every 12 hours  pantoprazole    Tablet 40 milliGRAM(s) Oral before breakfast  polyethylene glycol 3350 17 Gram(s) Oral daily  QUEtiapine 150 milliGRAM(s) Oral at bedtime  senna 2 Tablet(s) Oral at bedtime  sodium chloride 0.9%. 1000 milliLiter(s) (70 mL/Hr) IV Continuous <Continuous>  vancomycin  IVPB 1000 milliGRAM(s) IV Intermittent every 24 hours    MEDICATIONS  (PRN):  HYDROmorphone  Injectable 1 milliGRAM(s) IV Push every 4 hours PRN Severe Pain (7 - 10)    	    PHYSICAL EXAM:  T(C): 36.6 (12-14-18 @ 11:25), Max: 36.9 (12-13-18 @ 19:45)  HR: 83 (12-14-18 @ 13:50) (82 - 90)  BP: 161/83 (12-14-18 @ 13:50) (152/64 - 178/62)  RR: 18 (12-14-18 @ 11:25) (17 - 18)  SpO2: 96% (12-14-18 @ 11:25) (96% - 97%)  Wt(kg): --  I&O's Summary    13 Dec 2018 07:01  -  14 Dec 2018 07:00  --------------------------------------------------------  IN: 820 mL / OUT: 1500 mL / NET: -680 mL      Appearance: Normal	  HEENT:   NCAT, PERRL, EOMI	  Lymphatic: No lymphadenopathy  Cardiovascular: Normal S1 S2, RRR  Respiratory: Lungs clear to auscultation BL  Psychiatry: A & O x 3, Mood & affect appropriate  Gastrointestinal:  Soft, Non-tender, + BS  Skin: No rashes, No ecchymoses, No cyanosis	  Neurologic: Non-focal  Extremities: dec ROM, tenderness, erythema R hip    LABS:	 	    CARDIAC MARKERS:                                9.1    6.24  )-----------( 330      ( 14 Dec 2018 07:27 )             28.6     12-13    141  |  112<H>  |  20  ----------------------------<  104<H>  4.0   |  18<L>  |  0.68    Ca    8.3<L>      13 Dec 2018 06:21      proBNP:   Lipid Profile:   HgA1c:   TSH:
CHIEF COMPLAINT:Patient is a 73y old  Female who presents with a chief complaint of R hip pain (14 Dec 2018 16:54)    	        PAST MEDICAL & SURGICAL HISTORY:  Pain of right hip joint  Migraine  Alcohol abuse  Seizure  Stented coronary artery  Coronary artery disease  Anxiety  HTN (hypertension)  Emphysema, unspecified  Anxiety  Migraine  CAD (coronary artery disease)  Hyperlipidemia  Hypertension  Status post total hip replacement, right: 5/21/18  S/P bladder repair          REVIEW OF SYSTEMS:  CONSTITUTIONAL: No fever, weight loss, or fatigue  EYES: No eye pain, visual disturbances, or discharge  NECK: No pain or stiffness  RESPIRATORY: No cough, wheezing, chills or hemoptysis; No Shortness of Breath  CARDIOVASCULAR: No chest pain, palpitations, passing out, dizziness,   GASTROINTESTINAL: No abdominal or epigastric pain. No nausea, vomiting, or hematemesis; No diarrhea or constipation. No melena or hematochezia.  GENITOURINARY: No dysuria, frequency, hematuria, or incontinence  NEUROLOGICAL: No headaches, memory loss, loss of strength, numbness, or tremors  c/o r hip discomfort   swelling    Medications:  MEDICATIONS  (STANDING):  amLODIPine   Tablet 10 milliGRAM(s) Oral daily  aspirin enteric coated 81 milliGRAM(s) Oral daily  buDESOnide   0.5 milliGRAM(s) Respule 0.5 milliGRAM(s) Inhalation two times a day  cloNIDine 0.1 milliGRAM(s) Oral two times a day  gabapentin 300 milliGRAM(s) Oral three times a day  heparin  Injectable 5000 Unit(s) SubCutaneous every 12 hours  hydrALAZINE 50 milliGRAM(s) Oral every 8 hours  lisinopril 10 milliGRAM(s) Oral daily  losartan 50 milliGRAM(s) Oral daily  nicotine - 21 mG/24Hr(s) Patch 1 patch Transdermal daily  oxyCODONE  ER Tablet 10 milliGRAM(s) Oral every 12 hours  pantoprazole    Tablet 40 milliGRAM(s) Oral before breakfast  polyethylene glycol 3350 17 Gram(s) Oral daily  QUEtiapine 150 milliGRAM(s) Oral at bedtime  senna 2 Tablet(s) Oral at bedtime  sodium chloride 0.9%. 1000 milliLiter(s) (70 mL/Hr) IV Continuous <Continuous>  vancomycin  IVPB 1000 milliGRAM(s) IV Intermittent every 24 hours    MEDICATIONS  (PRN):  HYDROmorphone  Injectable 1 milliGRAM(s) IV Push every 4 hours PRN Severe Pain (7 - 10)    	    PHYSICAL EXAM:  T(C): 36.7 (12-16-18 @ 04:18), Max: 36.8 (12-15-18 @ 14:43)  HR: 96 (12-16-18 @ 04:18) (74 - 96)  BP: 188/83 (12-16-18 @ 04:18) (179/74 - 188/83)  RR: 18 (12-16-18 @ 04:18) (18 - 18)  SpO2: 94% (12-16-18 @ 04:18) (94% - 99%)  Wt(kg): --  I&O's Summary    15 Dec 2018 07:01  -  16 Dec 2018 07:00  --------------------------------------------------------  IN: 240 mL / OUT: 0 mL / NET: 240 mL        Appearance: Normal	  HEENT:   Normal oral mucosa, PERRL, EOMI	  Lymphatic: No lymphadenopathy  Cardiovascular: Normal S1 S2, No JVD, No murmurs,  Respiratory: Lungs clear to auscultation	  Psychiatry: A & O   Gastrointestinal:  Soft, Non-tender, + BS	  Skin: No rashes, No ecchymoses, No cyanosis	  Neurologic: Non-focal  Extremities: r hip / thigh swelling    Vascular: Peripheral pulses palpable 2+ bilaterally    TELEMETRY: 	    ECG:  	  RADIOLOGY:  OTHER: 	  	  LABS:	 	    CARDIAC MARKERS:                                9.6    5.75  )-----------( 348      ( 15 Dec 2018 09:18 )             30.9     12-15    141  |  105  |  12  ----------------------------<  90  4.0   |  24  |  0.52    Ca    8.6      15 Dec 2018 07:10      proBNP:   Lipid Profile:   HgA1c:   TSH:
CHIEF COMPLAINT:Patient is a 73y old  Female who presents with a chief complaint of R hip pain (14 Dec 2018 16:54)    	        PAST MEDICAL & SURGICAL HISTORY:  Pain of right hip joint  Migraine  Alcohol abuse  Seizure  Stented coronary artery  Coronary artery disease  Anxiety  HTN (hypertension)  Emphysema, unspecified  Anxiety  Migraine  CAD (coronary artery disease)  Hyperlipidemia  Hypertension  Status post total hip replacement, right: 5/21/18  S/P bladder repair          REVIEW OF SYSTEMS:  CONSTITUTIONAL: No fever, weight loss, or fatigue  EYES: No eye pain, visual disturbances, or discharge  NECK: No pain or stiffness  RESPIRATORY: No cough, wheezing, chills or hemoptysis; No Shortness of Breath  CARDIOVASCULAR: No chest pain, palpitations, passing out, dizziness,   GASTROINTESTINAL: No abdominal or epigastric pain. No nausea, vomiting, or hematemesis; No diarrhea or constipation. No melena or hematochezia.  GENITOURINARY: No dysuria, frequency, hematuria, or incontinence  NEUROLOGICAL: No headaches, memory loss, loss of strength, numbness, or tremors  r hip discomfort /swelling r thigh     Medications:  MEDICATIONS  (STANDING):  amLODIPine   Tablet 10 milliGRAM(s) Oral daily  aspirin enteric coated 81 milliGRAM(s) Oral daily  buDESOnide   0.5 milliGRAM(s) Respule 0.5 milliGRAM(s) Inhalation two times a day  cloNIDine 0.1 milliGRAM(s) Oral two times a day  gabapentin 300 milliGRAM(s) Oral three times a day  heparin  Injectable 5000 Unit(s) SubCutaneous every 12 hours  hydrALAZINE 50 milliGRAM(s) Oral every 8 hours  lisinopril 10 milliGRAM(s) Oral daily  losartan 50 milliGRAM(s) Oral daily  nicotine - 21 mG/24Hr(s) Patch 1 patch Transdermal daily  oxyCODONE  ER Tablet 10 milliGRAM(s) Oral every 12 hours  pantoprazole    Tablet 40 milliGRAM(s) Oral before breakfast  polyethylene glycol 3350 17 Gram(s) Oral daily  QUEtiapine 150 milliGRAM(s) Oral at bedtime  senna 2 Tablet(s) Oral at bedtime  sodium chloride 0.9%. 1000 milliLiter(s) (70 mL/Hr) IV Continuous <Continuous>  vancomycin  IVPB 1000 milliGRAM(s) IV Intermittent every 24 hours    MEDICATIONS  (PRN):  HYDROmorphone  Injectable 1 milliGRAM(s) IV Push every 4 hours PRN Severe Pain (7 - 10)    	    PHYSICAL EXAM:  T(C): 37.1 (12-15-18 @ 04:20), Max: 37.1 (12-15-18 @ 04:20)  HR: 81 (12-15-18 @ 04:20) (78 - 88)  BP: 156/77 (12-15-18 @ 04:20) (156/77 - 161/88)  RR: 18 (12-15-18 @ 04:20) (18 - 18)  SpO2: 95% (12-15-18 @ 04:20) (95% - 96%)  Wt(kg): --  I&O's Summary    14 Dec 2018 07:01  -  15 Dec 2018 07:00  --------------------------------------------------------  IN: 1430 mL / OUT: 0 mL / NET: 1430 mL        Appearance: Normal	  HEENT:   Normal oral mucosa, PERRL, EOMI	  Lymphatic: No lymphadenopathy  Cardiovascular: Normal S1 S2, No JVD, No murmurs, No edema  Respiratory: Lungs clear to auscultation	  Psychiatry: A & O x 3, Mood & affect appropriate  Gastrointestinal:  Soft, Non-tender, + BS	  Skin: No rashes, No ecchymoses, No cyanosis	  Neurologic: Non-focal  Extremities: r hip thigh redness / firm/ edematous   Vascular: Peripheral pulses palpable 2+ bilaterally    TELEMETRY: 	    ECG:  	  RADIOLOGY:  OTHER: 	  	  LABS:	 	    CARDIAC MARKERS:                                9.6    5.75  )-----------( 348      ( 15 Dec 2018 09:18 )             30.9     12-15    141  |  105  |  12  ----------------------------<  90  4.0   |  24  |  0.52    Ca    8.6      15 Dec 2018 07:10      proBNP:   Lipid Profile:   HgA1c:   TSH:
CHIEF COMPLAINT:Patient is a 73y old  Female who presents with a chief complaint of R hip pain (14 Dec 2018 16:54)  no acute events      PAST MEDICAL & SURGICAL HISTORY:  Pain of right hip joint  Migraine  Alcohol abuse  Seizure  Stented coronary artery  Coronary artery disease  Anxiety  HTN (hypertension)  Emphysema, unspecified  Anxiety  Migraine  CAD (coronary artery disease)  Hyperlipidemia  Hypertension  Status post total hip replacement, right: 5/21/18  S/P bladder repair          REVIEW OF SYSTEMS:  CONSTITUTIONAL: No fever, weight loss, or fatigue  EYES: No eye pain, visual disturbances, or discharge  NECK: No pain or stiffness  RESPIRATORY: No cough, wheezing, chills or hemoptysis; No Shortness of Breath  CARDIOVASCULAR: No chest pain, palpitations, passing out, dizziness,   GASTROINTESTINAL: No abdominal or epigastric pain. No nausea, vomiting, or hematemesis; No diarrhea or constipation. No melena or hematochezia.  GENITOURINARY: No dysuria, frequency, hematuria, or incontinence  NEUROLOGICAL: No headaches, memory loss, loss of strength, numbness, or tremors  c/o r hip discomfort   swelling      Medications:  MEDICATIONS  (STANDING):  amLODIPine   Tablet 10 milliGRAM(s) Oral daily  aspirin enteric coated 81 milliGRAM(s) Oral daily  buDESOnide   0.5 milliGRAM(s) Respule 0.5 milliGRAM(s) Inhalation two times a day  cloNIDine 0.1 milliGRAM(s) Oral two times a day  gabapentin 300 milliGRAM(s) Oral three times a day  heparin  Injectable 5000 Unit(s) SubCutaneous every 12 hours  hydrALAZINE 50 milliGRAM(s) Oral every 8 hours  lisinopril 10 milliGRAM(s) Oral daily  losartan 50 milliGRAM(s) Oral daily  nicotine - 21 mG/24Hr(s) Patch 1 patch Transdermal daily  oxyCODONE  ER Tablet 10 milliGRAM(s) Oral every 12 hours  pantoprazole    Tablet 40 milliGRAM(s) Oral before breakfast  polyethylene glycol 3350 17 Gram(s) Oral daily  QUEtiapine 150 milliGRAM(s) Oral at bedtime  senna 2 Tablet(s) Oral at bedtime  sodium chloride 0.9%. 1000 milliLiter(s) (70 mL/Hr) IV Continuous <Continuous>  vancomycin  IVPB 1000 milliGRAM(s) IV Intermittent every 24 hours    MEDICATIONS  (PRN):  HYDROmorphone  Injectable 1 milliGRAM(s) IV Push every 4 hours PRN Severe Pain (7 - 10)    	    PHYSICAL EXAM:  T(C): 36.8 (12-19-18 @ 12:06), Max: 36.9 (12-18-18 @ 21:26)  HR: 80 (12-19-18 @ 12:06) (61 - 80)  BP: 103/69 (12-19-18 @ 12:06) (103/69 - 149/73)  RR: 18 (12-19-18 @ 12:06) (18 - 18)  SpO2: 97% (12-19-18 @ 12:06) (97% - 98%)  Wt(kg): --  I&O's Summary    18 Dec 2018 07:01  -  19 Dec 2018 07:00  --------------------------------------------------------  IN: 1020 mL / OUT: 0 mL / NET: 1020 mL    19 Dec 2018 07:01  -  19 Dec 2018 13:06  --------------------------------------------------------  IN: 240 mL / OUT: 0 mL / NET: 240 mL      Appearance: Normal	  HEENT:   Normal oral mucosa, PERRL, EOMI	  Lymphatic: No lymphadenopathy  Cardiovascular: Normal S1 S2, No JVD, No murmurs,  Respiratory: Lungs clear to auscultation	  Psychiatry: A & O   Gastrointestinal:  Soft, Non-tender, + BS	  Skin: No rashes, No ecchymoses, No cyanosis	  Neurologic: Non-focal  Extremities: r hip / thigh swelling    Vascular: Peripheral pulses palpable 2+ bilaterally    LABS:	 	    CARDIAC MARKERS:                                9.3    5.12  )-----------( 286      ( 19 Dec 2018 08:11 )             31.0     12-19    138  |  100  |  24<H>  ----------------------------<  174<H>  4.0   |  25  |  0.71    Ca    8.4      19 Dec 2018 05:50      proBNP:   Lipid Profile:   HgA1c:   TSH:
CHIEF COMPLAINT:Patient is a 73y old  Female who presents with a chief complaint of R hip pain (14 Dec 2018 16:54)  no cute events      PAST MEDICAL & SURGICAL HISTORY:  Pain of right hip joint  Migraine  Alcohol abuse  Seizure  Stented coronary artery  Coronary artery disease  Anxiety  HTN (hypertension)  Emphysema, unspecified  Anxiety  Migraine  CAD (coronary artery disease)  Hyperlipidemia  Hypertension  Status post total hip replacement, right: 5/21/18  S/P bladder repair          REVIEW OF SYSTEMS:  CONSTITUTIONAL: No fever, weight loss, or fatigue  EYES: No eye pain, visual disturbances, or discharge  NECK: No pain or stiffness  RESPIRATORY: No cough, wheezing, chills or hemoptysis; No Shortness of Breath  CARDIOVASCULAR: No chest pain, palpitations, passing out, dizziness,   GASTROINTESTINAL: No abdominal or epigastric pain. No nausea, vomiting, or hematemesis; No diarrhea or constipation. No melena or hematochezia.  GENITOURINARY: No dysuria, frequency, hematuria, or incontinence  NEUROLOGICAL: No headaches, memory loss, loss of strength, numbness, or tremors  c/o r hip discomfort   swelling      Medications:  MEDICATIONS  (STANDING):  amLODIPine   Tablet 10 milliGRAM(s) Oral daily  aspirin enteric coated 81 milliGRAM(s) Oral daily  buDESOnide   0.5 milliGRAM(s) Respule 0.5 milliGRAM(s) Inhalation two times a day  cloNIDine 0.1 milliGRAM(s) Oral two times a day  gabapentin 300 milliGRAM(s) Oral three times a day  heparin  Injectable 5000 Unit(s) SubCutaneous every 12 hours  hydrALAZINE 50 milliGRAM(s) Oral every 8 hours  lisinopril 10 milliGRAM(s) Oral daily  losartan 50 milliGRAM(s) Oral daily  nicotine - 21 mG/24Hr(s) Patch 1 patch Transdermal daily  oxyCODONE  ER Tablet 10 milliGRAM(s) Oral every 12 hours  pantoprazole    Tablet 40 milliGRAM(s) Oral before breakfast  polyethylene glycol 3350 17 Gram(s) Oral daily  QUEtiapine 150 milliGRAM(s) Oral at bedtime  senna 2 Tablet(s) Oral at bedtime  sodium chloride 0.9%. 1000 milliLiter(s) (70 mL/Hr) IV Continuous <Continuous>  vancomycin  IVPB 1000 milliGRAM(s) IV Intermittent every 24 hours    MEDICATIONS  (PRN):  HYDROmorphone  Injectable 1 milliGRAM(s) IV Push every 4 hours PRN Severe Pain (7 - 10)    	    PHYSICAL EXAM:  T(C): 36.4 (12-17-18 @ 12:25), Max: 37 (12-17-18 @ 00:52)  HR: 72 (12-17-18 @ 12:25) (71 - 78)  BP: 122/63 (12-17-18 @ 12:25) (122/63 - 178/63)  RR: 18 (12-17-18 @ 12:25) (17 - 18)  SpO2: 97% (12-17-18 @ 12:25) (94% - 98%)  Wt(kg): --  I&O's Summary    16 Dec 2018 07:01  -  17 Dec 2018 07:00  --------------------------------------------------------  IN: 2480 mL / OUT: 0 mL / NET: 2480 mL    17 Dec 2018 07:01  -  17 Dec 2018 13:00  --------------------------------------------------------  IN: 240 mL / OUT: 0 mL / NET: 240 mL      LABS:	 	    CARDIAC MARKERS:                                10.1   4.91  )-----------( 343      ( 17 Dec 2018 07:44 )             32.4     12-17    139  |  101  |  21  ----------------------------<  84  4.2   |  26  |  0.57    Ca    8.5      17 Dec 2018 06:29      proBNP:   Lipid Profile:   HgA1c:   TSH:     	        Appearance: Normal	  HEENT:   Normal oral mucosa, PERRL, EOMI	  Lymphatic: No lymphadenopathy  Cardiovascular: Normal S1 S2, No JVD, No murmurs,  Respiratory: Lungs clear to auscultation	  Psychiatry: A & O   Gastrointestinal:  Soft, Non-tender, + BS	  Skin: No rashes, No ecchymoses, No cyanosis	  Neurologic: Non-focal  Extremities: r hip / thigh swelling    Vascular: Peripheral pulses palpable 2+ bilaterally
CHIEF COMPLAINT:Patient is a 73y old  Female who presents with a chief complaint of R hip pain (14 Dec 2018 16:54)  no cute events      PAST MEDICAL & SURGICAL HISTORY:  Pain of right hip joint  Migraine  Alcohol abuse  Seizure  Stented coronary artery  Coronary artery disease  Anxiety  HTN (hypertension)  Emphysema, unspecified  Anxiety  Migraine  CAD (coronary artery disease)  Hyperlipidemia  Hypertension  Status post total hip replacement, right: 5/21/18  S/P bladder repair          REVIEW OF SYSTEMS:  CONSTITUTIONAL: No fever, weight loss, or fatigue  EYES: No eye pain, visual disturbances, or discharge  NECK: No pain or stiffness  RESPIRATORY: No cough, wheezing, chills or hemoptysis; No Shortness of Breath  CARDIOVASCULAR: No chest pain, palpitations, passing out, dizziness,   GASTROINTESTINAL: No abdominal or epigastric pain. No nausea, vomiting, or hematemesis; No diarrhea or constipation. No melena or hematochezia.  GENITOURINARY: No dysuria, frequency, hematuria, or incontinence  NEUROLOGICAL: No headaches, memory loss, loss of strength, numbness, or tremors  c/o r hip discomfort   swelling      Medications:  MEDICATIONS  (STANDING):  amLODIPine   Tablet 10 milliGRAM(s) Oral daily  aspirin enteric coated 81 milliGRAM(s) Oral daily  buDESOnide   0.5 milliGRAM(s) Respule 0.5 milliGRAM(s) Inhalation two times a day  cloNIDine 0.1 milliGRAM(s) Oral two times a day  gabapentin 300 milliGRAM(s) Oral three times a day  heparin  Injectable 5000 Unit(s) SubCutaneous every 12 hours  hydrALAZINE 50 milliGRAM(s) Oral every 8 hours  lisinopril 10 milliGRAM(s) Oral daily  losartan 50 milliGRAM(s) Oral daily  nicotine - 21 mG/24Hr(s) Patch 1 patch Transdermal daily  oxyCODONE  ER Tablet 10 milliGRAM(s) Oral every 12 hours  pantoprazole    Tablet 40 milliGRAM(s) Oral before breakfast  polyethylene glycol 3350 17 Gram(s) Oral daily  QUEtiapine 150 milliGRAM(s) Oral at bedtime  senna 2 Tablet(s) Oral at bedtime  sodium chloride 0.9%. 1000 milliLiter(s) (70 mL/Hr) IV Continuous <Continuous>  vancomycin  IVPB 1000 milliGRAM(s) IV Intermittent every 24 hours    MEDICATIONS  (PRN):  HYDROmorphone  Injectable 1 milliGRAM(s) IV Push every 4 hours PRN Severe Pain (7 - 10)    	    PHYSICAL EXAM:  T(C): 36.8 (12-18-18 @ 12:29), Max: 36.9 (12-18-18 @ 05:41)  HR: 61 (12-18-18 @ 14:04) (61 - 90)  BP: 149/73 (12-18-18 @ 14:04) (111/68 - 149/73)  RR: 18 (12-18-18 @ 12:29) (18 - 18)  SpO2: 97% (12-18-18 @ 12:29) (96% - 98%)  Wt(kg): --  I&O's Summary    17 Dec 2018 07:01  -  18 Dec 2018 07:00  --------------------------------------------------------  IN: 490 mL / OUT: 0 mL / NET: 490 mL    18 Dec 2018 07:01  -  18 Dec 2018 16:18  --------------------------------------------------------  IN: 720 mL / OUT: 0 mL / NET: 720 mL      Appearance: Normal	  HEENT:   Normal oral mucosa, PERRL, EOMI	  Lymphatic: No lymphadenopathy  Cardiovascular: Normal S1 S2, No JVD, No murmurs,  Respiratory: Lungs clear to auscultation	  Psychiatry: A & O   Gastrointestinal:  Soft, Non-tender, + BS	  Skin: No rashes, No ecchymoses, No cyanosis	  Neurologic: Non-focal  Extremities: r hip / thigh swelling    Vascular: Peripheral pulses palpable 2+ bilaterally      LABS:	 	    CARDIAC MARKERS:                                10.1   4.91  )-----------( 343      ( 17 Dec 2018 07:44 )             32.4     12-17    139  |  101  |  21  ----------------------------<  84  4.2   |  26  |  0.57    Ca    8.5      17 Dec 2018 06:29      proBNP:   Lipid Profile:   HgA1c:   TSH:
infectious diseases progress note:    Patient is a 73y old  Female who presents with a chief complaint of R hip pain (13 Dec 2018 15:45)        Infection and inflammatory reaction due to other internal orthopedic prosthetic devices, implants and grafts, sequela          appetite  EYES:  Negative  blurry vision or double vision  CARDIOVASCULAR:  Negative for chest pain or palpitations  RESPIRATORY:  Negative for cough, wheezing, or SOB   GASTROINTESTINAL:  Negative for nausea, vomiting, diarrhea, constipation, or abdominal pain  GENITOURINARY:  Negative frequency, urgency or dysuria  NEUROLOGIC:  No headache, confusion, dizziness, lightheadedness    Allergies    No Known Allergies    Intolerances        ANTIBIOTICS/RELEVANT:  antimicrobials  vancomycin  IVPB 1000 milliGRAM(s) IV Intermittent every 24 hours    immunologic:    OTHER:  amLODIPine   Tablet 10 milliGRAM(s) Oral daily  aspirin enteric coated 81 milliGRAM(s) Oral daily  buDESOnide   0.5 milliGRAM(s) Respule 0.5 milliGRAM(s) Inhalation two times a day  cloNIDine 0.1 milliGRAM(s) Oral two times a day  gabapentin 300 milliGRAM(s) Oral three times a day  heparin  Injectable 5000 Unit(s) SubCutaneous every 12 hours  hydrALAZINE 50 milliGRAM(s) Oral every 8 hours  HYDROmorphone  Injectable 1 milliGRAM(s) IV Push every 4 hours PRN  lisinopril 10 milliGRAM(s) Oral daily  losartan 50 milliGRAM(s) Oral daily  nicotine - 21 mG/24Hr(s) Patch 1 patch Transdermal daily  oxyCODONE  ER Tablet 10 milliGRAM(s) Oral every 12 hours  pantoprazole    Tablet 40 milliGRAM(s) Oral before breakfast  polyethylene glycol 3350 17 Gram(s) Oral daily  QUEtiapine 150 milliGRAM(s) Oral at bedtime  senna 2 Tablet(s) Oral at bedtime  sodium chloride 0.9%. 1000 milliLiter(s) IV Continuous <Continuous>      Objective:  Vital Signs Last 24 Hrs  T(C): 36.8 (14 Dec 2018 04:33), Max: 36.9 (13 Dec 2018 19:45)  T(F): 98.2 (14 Dec 2018 04:33), Max: 98.5 (13 Dec 2018 19:45)  HR: 82 (14 Dec 2018 04:33) (82 - 90)  BP: 154/68 (14 Dec 2018 04:33) (123/68 - 178/62)  BP(mean): --  RR: 17 (14 Dec 2018 04:33) (17 - 18)  SpO2: 97% (14 Dec 2018 04:33) (96% - 97%)    PHYSICAL EXAM:   no acute distress  Eyes:PAYTON, EOMI  Ear/Nose/Throat: no oral lesion, no sinus tenderness on percussion	  Neck:no JVD, no lymphadenopathy, supple  Respiratory: CTA lorena     Gastrointestinal:soft, (+) BS, no HSM  Extremities: leg without drainage but indurated and red.         LABS:                        9.1    6.24  )-----------( 330      ( 14 Dec 2018 07:27 )             28.6     12-13    141  |  112<H>  |  20  ----------------------------<  104<H>  4.0   |  18<L>  |  0.68    Ca    8.3<L>      13 Dec 2018 06:21    TPro  7.6  /  Alb  3.0<L>  /  TBili  0.1<L>  /  DBili  x   /  AST  39  /  ALT  18  /  AlkPhos  120  12-12            MICROBIOLOGY:    RECENT CULTURES:  12-12 @ 18:08 .Blood Blood                No growth to date.          RESPIRATORY CULTURES:              RADIOLOGY & ADDITIONAL STUDIES:        Pager [phone]  After 5 pm/weekends or if no response :[phone]
infectious diseases progress note:    Patient is a 73y old  Female who presents with a chief complaint of R hip pain (14 Dec 2018 16:54)        Infection and inflammatory reaction due to other internal orthopedic prosthetic devices, implants and grafts, sequela        R   GENITOURINARY:  Negative frequency, urgency or dysuria  NEUROLOGIC:  No headache, confusion, dizziness, lightheadedness    Allergies    No Known Allergies    Intolerances        ANTIBIOTICS/RELEVANT:  antimicrobials  vancomycin  IVPB 1000 milliGRAM(s) IV Intermittent every 24 hours    immunologic:    OTHER:  amLODIPine   Tablet 10 milliGRAM(s) Oral daily  aspirin enteric coated 81 milliGRAM(s) Oral daily  buDESOnide   0.5 milliGRAM(s) Respule 0.5 milliGRAM(s) Inhalation two times a day  cloNIDine 0.1 milliGRAM(s) Oral two times a day  gabapentin 300 milliGRAM(s) Oral three times a day  heparin  Injectable 5000 Unit(s) SubCutaneous every 12 hours  hydrALAZINE 50 milliGRAM(s) Oral every 8 hours  HYDROmorphone  Injectable 1 milliGRAM(s) IV Push every 4 hours PRN  lisinopril 10 milliGRAM(s) Oral daily  losartan 50 milliGRAM(s) Oral daily  nicotine - 21 mG/24Hr(s) Patch 1 patch Transdermal daily  oxyCODONE  ER Tablet 10 milliGRAM(s) Oral every 12 hours  pantoprazole    Tablet 40 milliGRAM(s) Oral before breakfast  polyethylene glycol 3350 17 Gram(s) Oral daily  QUEtiapine 150 milliGRAM(s) Oral at bedtime  senna 2 Tablet(s) Oral at bedtime  sodium chloride 0.9%. 1000 milliLiter(s) IV Continuous <Continuous>      Objective:  Vital Signs Last 24 Hrs  T(C): 36.7 (17 Dec 2018 05:13), Max: 37 (17 Dec 2018 00:52)  T(F): 98.1 (17 Dec 2018 05:13), Max: 98.6 (17 Dec 2018 00:52)  HR: 76 (17 Dec 2018 05:13) (71 - 81)  BP: 133/70 (17 Dec 2018 05:13) (133/70 - 178/63)  BP(mean): --  RR: 18 (17 Dec 2018 05:13) (17 - 18)  SpO2: 94% (17 Dec 2018 05:13) (94% - 98%)    PHYSICAL EXAM:   no acute distress  Eyes:PAYTON, EOMI  Ear/Nose/Throat: no oral lesion, no sinus tenderness on percussion	  Neck:no JVD, no lymphadenopathy, supple  Respiratory: CTA lorena  Cardiovascular: S1S2 RRR, no murmurs  Gastrointestinal:soft, (+) BS, no HSM  Extremities:vxfshnc3ss and red.        LABS:                        9.6    5.75  )-----------( 348      ( 15 Dec 2018 09:18 )             30.9     12-17    139  |  101  |  21  ----------------------------<  84  4.2   |  26  |  0.57    Ca    8.5      17 Dec 2018 06:29              MICROBIOLOGY:    RECENT CULTURES:  12-12 @ 18:08 .Blood Blood                No growth to date.          RESPIRATORY CULTURES:              RADIOLOGY & ADDITIONAL STUDIES:        Pager [phone]  After 5 pm/weekends or if no response :[phone]
infectious diseases progress note:    Patient is a 73y old  Female who presents with a chief complaint of cellulitis (18 Dec 2018 08:01)        Infection and inflammatory reaction due to other internal orthopedic prosthetic devices, implants and grafts, sequela        ROS:  CONSTITUTIONAL:  Negative fever or chills, feels well, good appetite  E     No Known Allergies    Intolerances        ANTIBIOTICS/RELEVANT:  antimicrobials  vancomycin  IVPB 1000 milliGRAM(s) IV Intermittent every 24 hours    immunologic:    OTHER:  amLODIPine   Tablet 10 milliGRAM(s) Oral daily  aspirin enteric coated 81 milliGRAM(s) Oral daily  buDESOnide   0.5 milliGRAM(s) Respule 0.5 milliGRAM(s) Inhalation two times a day  cloNIDine 0.1 milliGRAM(s) Oral two times a day  gabapentin 300 milliGRAM(s) Oral three times a day  heparin  Injectable 5000 Unit(s) SubCutaneous every 12 hours  hydrALAZINE 50 milliGRAM(s) Oral every 8 hours  HYDROmorphone  Injectable 1 milliGRAM(s) IV Push every 4 hours PRN  lisinopril 10 milliGRAM(s) Oral daily  losartan 50 milliGRAM(s) Oral daily  nicotine - 21 mG/24Hr(s) Patch 1 patch Transdermal daily  oxyCODONE  ER Tablet 10 milliGRAM(s) Oral every 12 hours  pantoprazole    Tablet 40 milliGRAM(s) Oral before breakfast  polyethylene glycol 3350 17 Gram(s) Oral daily  QUEtiapine 150 milliGRAM(s) Oral at bedtime  senna 2 Tablet(s) Oral at bedtime  sodium chloride 0.9%. 1000 milliLiter(s) IV Continuous <Continuous>      Objective:  Vital Signs Last 24 Hrs  T(C): 36.8 (19 Dec 2018 04:01), Max: 36.9 (18 Dec 2018 21:26)  T(F): 98.2 (19 Dec 2018 04:01), Max: 98.5 (18 Dec 2018 21:26)  HR: 75 (19 Dec 2018 06:25) (61 - 90)  BP: 127/66 (19 Dec 2018 06:25) (111/68 - 149/73)  BP(mean): --  RR: 18 (19 Dec 2018 04:01) (18 - 18)  SpO2: 98% (19 Dec 2018 04:01) (97% - 98%)    PHYSICAL EXAM:   -no acute distress     Gastrointestinal:soft, (+) BS, no HSM  Extremities: tender and red       LABS:                        10.1   4.91  )-----------( 343      ( 17 Dec 2018 07:44 )             32.4     12-19    138  |  100  |  24<H>  ----------------------------<  174<H>  4.0   |  25  |  0.71    Ca    8.4      19 Dec 2018 05:50              MICROBIOLOGY:    RECENT CULTURES:  12-12 @ 18:08 .Blood Blood                No growth at 5 days.          RESPIRATORY CULTURES:              RADIOLOGY & ADDITIONAL STUDIES:        Pager [phone]  After 5 pm/weekends or if no response :[phone]
infectious diseases progress note:    Patient is a 73y old  Female who presents with a chief complaint of infected leg (19 Dec 2018 06:45)        Infection and inflammatory reaction due to other internal orthopedic prosthetic devices, implants and grafts, sequela        R   Allergies    No Known Allergies    Intolerances        ANTIBIOTICS/RELEVANT:  antimicrobials  vancomycin  IVPB 1000 milliGRAM(s) IV Intermittent every 24 hours    immunologic:    OTHER:  amLODIPine   Tablet 10 milliGRAM(s) Oral daily  aspirin enteric coated 81 milliGRAM(s) Oral daily  buDESOnide   0.5 milliGRAM(s) Respule 0.5 milliGRAM(s) Inhalation two times a day  cloNIDine 0.1 milliGRAM(s) Oral two times a day  gabapentin 300 milliGRAM(s) Oral three times a day  heparin  Injectable 5000 Unit(s) SubCutaneous every 12 hours  hydrALAZINE 50 milliGRAM(s) Oral every 8 hours  HYDROmorphone  Injectable 1 milliGRAM(s) IV Push every 4 hours PRN  lisinopril 10 milliGRAM(s) Oral daily  losartan 50 milliGRAM(s) Oral daily  nicotine - 21 mG/24Hr(s) Patch 1 patch Transdermal daily  pantoprazole    Tablet 40 milliGRAM(s) Oral before breakfast  polyethylene glycol 3350 17 Gram(s) Oral daily  QUEtiapine 150 milliGRAM(s) Oral at bedtime  senna 2 Tablet(s) Oral at bedtime  sodium chloride 0.9%. 1000 milliLiter(s) IV Continuous <Continuous>      Objective:  Vital Signs Last 24 Hrs  T(C): 36.9 (20 Dec 2018 05:32), Max: 37 (19 Dec 2018 21:23)  T(F): 98.5 (20 Dec 2018 05:32), Max: 98.6 (19 Dec 2018 21:23)  HR: 69 (20 Dec 2018 05:32) (69 - 80)  BP: 124/61 (20 Dec 2018 05:32) (103/69 - 124/61)  BP(mean): --  RR: 18 (20 Dec 2018 05:32) (18 - 18)  SpO2: 96% (20 Dec 2018 05:32) (96% - 98%)       Eyes:PAYTON, EOMI  Ear/Nose/Throat: no oral lesion, no sinus tenderness on percussion	  Neck:no JVD, no lymphadenopathy, supple  Respiratory: CTA lorena  Cardiovascular: S1S2 RRR, no murmurs  Gastrointestinal:soft, (+) BS, no HSM  Extremities:n no drainge         LABS:                        9.3    5.12  )-----------( 286      ( 19 Dec 2018 08:11 )             31.0     12-19    138  |  100  |  24<H>  ----------------------------<  174<H>  4.0   |  25  |  0.71    Ca    8.4      19 Dec 2018 05:50              MICROBIOLOGY:    RECENT CULTURES:        RESPIRATORY CULTURES:              RADIOLOGY & ADDITIONAL STUDIES:        Pager [phone]  After 5 pm/weekends or if no response :[phone]
infectious diseases progress note:    Patient is a 73y old  Female who presents with a chief complaint of mrsa (17 Dec 2018 07:50)        Infection and inflammatory reaction due to other internal orthopedic prosthetic devices, implants and grafts, sequela           Allergies    No Known Allergies    Intolerances        ANTIBIOTICS/RELEVANT:  antimicrobials  vancomycin  IVPB 1000 milliGRAM(s) IV Intermittent every 24 hours    immunologic:    OTHER:  amLODIPine   Tablet 10 milliGRAM(s) Oral daily  aspirin enteric coated 81 milliGRAM(s) Oral daily  buDESOnide   0.5 milliGRAM(s) Respule 0.5 milliGRAM(s) Inhalation two times a day  cloNIDine 0.1 milliGRAM(s) Oral two times a day  gabapentin 300 milliGRAM(s) Oral three times a day  heparin  Injectable 5000 Unit(s) SubCutaneous every 12 hours  hydrALAZINE 50 milliGRAM(s) Oral every 8 hours  HYDROmorphone  Injectable 1 milliGRAM(s) IV Push every 4 hours PRN  lisinopril 10 milliGRAM(s) Oral daily  losartan 50 milliGRAM(s) Oral daily  nicotine - 21 mG/24Hr(s) Patch 1 patch Transdermal daily  oxyCODONE  ER Tablet 10 milliGRAM(s) Oral every 12 hours  pantoprazole    Tablet 40 milliGRAM(s) Oral before breakfast  polyethylene glycol 3350 17 Gram(s) Oral daily  QUEtiapine 150 milliGRAM(s) Oral at bedtime  senna 2 Tablet(s) Oral at bedtime  sodium chloride 0.9%. 1000 milliLiter(s) IV Continuous <Continuous>      Objective:  Vital Signs Last 24 Hrs  T(C): 36.9 (18 Dec 2018 05:41), Max: 36.9 (18 Dec 2018 05:41)  T(F): 98.4 (18 Dec 2018 05:41), Max: 98.4 (18 Dec 2018 05:41)  HR: 69 (18 Dec 2018 05:41) (69 - 88)  BP: 138/72 (18 Dec 2018 05:41) (122/63 - 142/71)  BP(mean): --  RR: 18 (18 Dec 2018 05:41) (18 - 18)  SpO2: 96% (18 Dec 2018 05:41) (96% - 98%)    PHYSICAL EXAM:   -no acute distress  Eyes:PAYTON, EOMI  Ear/Nose/Throat: no oral lesion, no sinus tenderness on percussion	  Neck:no JVD, no lymphadenopathy, supple     Gastrointestinal:soft, (+) BS, no HSM  Extremities:no  cahnge         LABS:                        10.1   4.91  )-----------( 343      ( 17 Dec 2018 07:44 )             32.4     12-17    139  |  101  |  21  ----------------------------<  84  4.2   |  26  |  0.57    Ca    8.5      17 Dec 2018 06:29              MICROBIOLOGY:    RECENT CULTURES:  12-12 @ 18:08 .Blood Blood                No growth at 5 days.          RESPIRATORY CULTURES:              RADIOLOGY & ADDITIONAL STUDIES:        Pager [phone]  After 5 pm/weekends or if no response :[phone]

## 2018-12-20 NOTE — CHART NOTE - NSCHARTNOTEFT_GEN_A_CORE
Patient is ambulating with walker and will be discharged to home today as per Medicine. Patient will continue to take PO antibiotics and will follow-up with Dr. Be in the office in 2-3 weeks.

## 2019-01-09 NOTE — DIETITIAN INITIAL EVALUATION ADULT. - NS FNS WEIGHT USED FOR CALC
Patient reports feeling unsteady on his feet for past few days, maybe more per wife. Reports elevated blood sugars since yesterday, in 300s-400s. Denies any increased one sided weakness. Reports sleeping more. Denies any pain.  Wife reports confusion for pa  pounds

## 2019-01-18 ENCOUNTER — EMERGENCY (EMERGENCY)
Facility: HOSPITAL | Age: 74
LOS: 1 days | Discharge: HOME | End: 2019-01-18
Admitting: EMERGENCY MEDICINE
Payer: MEDICARE

## 2019-01-18 VITALS
WEIGHT: 119.93 LBS | HEART RATE: 88 BPM | TEMPERATURE: 98 F | DIASTOLIC BLOOD PRESSURE: 79 MMHG | SYSTOLIC BLOOD PRESSURE: 192 MMHG | HEIGHT: 64 IN | OXYGEN SATURATION: 98 % | RESPIRATION RATE: 16 BRPM

## 2019-01-18 VITALS
RESPIRATION RATE: 16 BRPM | OXYGEN SATURATION: 97 % | SYSTOLIC BLOOD PRESSURE: 161 MMHG | TEMPERATURE: 98 F | DIASTOLIC BLOOD PRESSURE: 100 MMHG | HEART RATE: 78 BPM

## 2019-01-18 DIAGNOSIS — Z96.641 PRESENCE OF RIGHT ARTIFICIAL HIP JOINT: Chronic | ICD-10-CM

## 2019-01-18 DIAGNOSIS — Z98.89 OTHER SPECIFIED POSTPROCEDURAL STATES: Chronic | ICD-10-CM

## 2019-01-18 LAB
ALBUMIN SERPL ELPH-MCNC: 3.9 G/DL — SIGNIFICANT CHANGE UP (ref 3.3–5)
ALP SERPL-CCNC: 138 U/L — HIGH (ref 40–120)
ALT FLD-CCNC: 13 U/L — SIGNIFICANT CHANGE UP (ref 10–45)
ANION GAP SERPL CALC-SCNC: 14 MMOL/L — SIGNIFICANT CHANGE UP (ref 5–17)
APTT BLD: 28.3 SEC — SIGNIFICANT CHANGE UP (ref 27.5–36.3)
AST SERPL-CCNC: 20 U/L — SIGNIFICANT CHANGE UP (ref 10–40)
BASOPHILS # BLD AUTO: 0 K/UL — SIGNIFICANT CHANGE UP (ref 0–0.2)
BASOPHILS NFR BLD AUTO: 0.3 % — SIGNIFICANT CHANGE UP (ref 0–2)
BILIRUB SERPL-MCNC: 0.3 MG/DL — SIGNIFICANT CHANGE UP (ref 0.2–1.2)
BUN SERPL-MCNC: 14 MG/DL — SIGNIFICANT CHANGE UP (ref 7–23)
CALCIUM SERPL-MCNC: 8.9 MG/DL — SIGNIFICANT CHANGE UP (ref 8.4–10.5)
CHLORIDE SERPL-SCNC: 96 MMOL/L — SIGNIFICANT CHANGE UP (ref 96–108)
CO2 SERPL-SCNC: 23 MMOL/L — SIGNIFICANT CHANGE UP (ref 22–31)
CREAT SERPL-MCNC: 0.53 MG/DL — SIGNIFICANT CHANGE UP (ref 0.5–1.3)
EOSINOPHIL # BLD AUTO: 0.1 K/UL — SIGNIFICANT CHANGE UP (ref 0–0.5)
EOSINOPHIL NFR BLD AUTO: 1.3 % — SIGNIFICANT CHANGE UP (ref 0–6)
GLUCOSE SERPL-MCNC: 104 MG/DL — HIGH (ref 70–99)
HCT VFR BLD CALC: 41.3 % — SIGNIFICANT CHANGE UP (ref 34.5–45)
HGB BLD-MCNC: 13.9 G/DL — SIGNIFICANT CHANGE UP (ref 11.5–15.5)
INR BLD: 1.09 RATIO — SIGNIFICANT CHANGE UP (ref 0.88–1.16)
LYMPHOCYTES # BLD AUTO: 1.7 K/UL — SIGNIFICANT CHANGE UP (ref 1–3.3)
LYMPHOCYTES # BLD AUTO: 18.2 % — SIGNIFICANT CHANGE UP (ref 13–44)
MCHC RBC-ENTMCNC: 29.7 PG — SIGNIFICANT CHANGE UP (ref 27–34)
MCHC RBC-ENTMCNC: 33.6 GM/DL — SIGNIFICANT CHANGE UP (ref 32–36)
MCV RBC AUTO: 88.3 FL — SIGNIFICANT CHANGE UP (ref 80–100)
MONOCYTES # BLD AUTO: 0.7 K/UL — SIGNIFICANT CHANGE UP (ref 0–0.9)
MONOCYTES NFR BLD AUTO: 7.4 % — SIGNIFICANT CHANGE UP (ref 2–14)
NEUTROPHILS # BLD AUTO: 6.6 K/UL — SIGNIFICANT CHANGE UP (ref 1.8–7.4)
NEUTROPHILS NFR BLD AUTO: 72.8 % — SIGNIFICANT CHANGE UP (ref 43–77)
PLATELET # BLD AUTO: 337 K/UL — SIGNIFICANT CHANGE UP (ref 150–400)
POTASSIUM SERPL-MCNC: 4 MMOL/L — SIGNIFICANT CHANGE UP (ref 3.5–5.3)
POTASSIUM SERPL-SCNC: 4 MMOL/L — SIGNIFICANT CHANGE UP (ref 3.5–5.3)
PROT SERPL-MCNC: 7.8 G/DL — SIGNIFICANT CHANGE UP (ref 6–8.3)
PROTHROM AB SERPL-ACNC: 12.5 SEC — SIGNIFICANT CHANGE UP (ref 10–12.9)
RBC # BLD: 4.67 M/UL — SIGNIFICANT CHANGE UP (ref 3.8–5.2)
RBC # FLD: 16.4 % — HIGH (ref 10.3–14.5)
SODIUM SERPL-SCNC: 133 MMOL/L — LOW (ref 135–145)
WBC # BLD: 9.1 K/UL — SIGNIFICANT CHANGE UP (ref 3.8–10.5)
WBC # FLD AUTO: 9.1 K/UL — SIGNIFICANT CHANGE UP (ref 3.8–10.5)

## 2019-01-18 PROCEDURE — 85730 THROMBOPLASTIN TIME PARTIAL: CPT

## 2019-01-18 PROCEDURE — 80053 COMPREHEN METABOLIC PANEL: CPT

## 2019-01-18 PROCEDURE — 99053 MED SERV 10PM-8AM 24 HR FAC: CPT

## 2019-01-18 PROCEDURE — 99284 EMERGENCY DEPT VISIT MOD MDM: CPT | Mod: 25

## 2019-01-18 PROCEDURE — 73502 X-RAY EXAM HIP UNI 2-3 VIEWS: CPT | Mod: 26,RT

## 2019-01-18 PROCEDURE — 85027 COMPLETE CBC AUTOMATED: CPT

## 2019-01-18 PROCEDURE — 73502 X-RAY EXAM HIP UNI 2-3 VIEWS: CPT

## 2019-01-18 PROCEDURE — 87040 BLOOD CULTURE FOR BACTERIA: CPT

## 2019-01-18 PROCEDURE — 85610 PROTHROMBIN TIME: CPT

## 2019-01-18 PROCEDURE — 72170 X-RAY EXAM OF PELVIS: CPT

## 2019-01-18 PROCEDURE — 96375 TX/PRO/DX INJ NEW DRUG ADDON: CPT

## 2019-01-18 PROCEDURE — 96374 THER/PROPH/DIAG INJ IV PUSH: CPT

## 2019-01-18 RX ORDER — ONDANSETRON 8 MG/1
4 TABLET, FILM COATED ORAL ONCE
Qty: 0 | Refills: 0 | Status: COMPLETED | OUTPATIENT
Start: 2019-01-18 | End: 2019-01-18

## 2019-01-18 RX ORDER — MORPHINE SULFATE 50 MG/1
4 CAPSULE, EXTENDED RELEASE ORAL ONCE
Qty: 0 | Refills: 0 | Status: DISCONTINUED | OUTPATIENT
Start: 2019-01-18 | End: 2019-01-18

## 2019-01-18 RX ORDER — OXYCODONE HYDROCHLORIDE 5 MG/1
5 TABLET ORAL ONCE
Qty: 0 | Refills: 0 | Status: DISCONTINUED | OUTPATIENT
Start: 2019-01-18 | End: 2019-01-18

## 2019-01-18 RX ORDER — FAMOTIDINE 10 MG/ML
20 INJECTION INTRAVENOUS ONCE
Qty: 0 | Refills: 0 | Status: COMPLETED | OUTPATIENT
Start: 2019-01-18 | End: 2019-01-18

## 2019-01-18 RX ORDER — OXYCODONE HYDROCHLORIDE 5 MG/1
1 TABLET ORAL
Qty: 8 | Refills: 0 | OUTPATIENT
Start: 2019-01-18

## 2019-01-18 RX ADMIN — OXYCODONE HYDROCHLORIDE 5 MILLIGRAM(S): 5 TABLET ORAL at 02:08

## 2019-01-18 RX ADMIN — OXYCODONE HYDROCHLORIDE 5 MILLIGRAM(S): 5 TABLET ORAL at 02:11

## 2019-01-18 RX ADMIN — MORPHINE SULFATE 4 MILLIGRAM(S): 50 CAPSULE, EXTENDED RELEASE ORAL at 03:05

## 2019-01-18 RX ADMIN — ONDANSETRON 4 MILLIGRAM(S): 8 TABLET, FILM COATED ORAL at 03:31

## 2019-01-18 RX ADMIN — OXYCODONE HYDROCHLORIDE 5 MILLIGRAM(S): 5 TABLET ORAL at 06:23

## 2019-01-18 RX ADMIN — OXYCODONE HYDROCHLORIDE 5 MILLIGRAM(S): 5 TABLET ORAL at 06:40

## 2019-01-18 RX ADMIN — MORPHINE SULFATE 4 MILLIGRAM(S): 50 CAPSULE, EXTENDED RELEASE ORAL at 05:02

## 2019-01-18 RX ADMIN — FAMOTIDINE 20 MILLIGRAM(S): 10 INJECTION INTRAVENOUS at 03:31

## 2019-01-18 NOTE — ED PROVIDER NOTE - CARE PLAN
Principal Discharge DX:	Hip pain Principal Discharge DX:	Hip arthritis  Secondary Diagnosis:	Hip joint inflamed

## 2019-01-18 NOTE — ED PROVIDER NOTE - PROGRESS NOTE DETAILS
discussed with ortho, well known to service. States chronic pain, no acute surg intervention. Rec XR to assess hardware and pain control patient ambulatory without assistance. Requesting IV morphine and emergent surgery for pain. Explained multiple times in length that is not an option. Patient declining further pain medication

## 2019-01-18 NOTE — ED ADULT NURSE NOTE - CHPI ED NUR SYMPTOMS NEG
no vomiting/no dizziness/no weakness/no nausea/no chills/no decreased eating/drinking/no fever/no tingling

## 2019-01-18 NOTE — ED PROVIDER NOTE - OBJECTIVE STATEMENT
73F with pmh CAD, total right hip replacement complicated by MRSA infection and chronic pain presenting with right hip pain worse past 2 days. States she was walking when pain began to worsen 2 days ago. Patient prior was on PICC for chronic infection with vanc, transitioned to doxycycline since discharge from hospital about 1 month ago. Patient states the tylenol and doxy has been causing upset stomach and nausea. No fever, cp, sob, dizziness, dysuria

## 2019-01-18 NOTE — ED ADULT NURSE REASSESSMENT NOTE - NS ED NURSE REASSESS COMMENT FT1
pt receiving 4 mg Morphine IVP, stating as nurse is pushing medication "I don't feel anything, are you sure I am getting the Morphine." pt received 4 Mg of Morphine per MD order. MD aware of pt statement. will cont to monitor pain level.

## 2019-01-18 NOTE — ED PROVIDER NOTE - ATTENDING CONTRIBUTION TO CARE
MD Lennon:  patient seen and evaluated with the resident.  I was present for key portions of the History & Physical, and I agree with the Impression & Plan.  MD Lennon:  72 yo F, c/o acute on chronic R hip pain.  Duration (chronic 8mos - since R USAMA 5/18) MD Lennon:  patient seen and evaluated with the resident.  I was present for key portions of the History & Physical, and I agree with the Impression & Plan.  MD Lennon:  72 yo F, c/o acute on chronic R hip pain. 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  On doxycycline.  Followed closely by ID and Ortho.   Duration acute (24 hrs) on chronic( 8mos - since R USAMA 5/18).  Associated Sx: no f/c, no body aches, no back pain, no n/v.  Location: R hip.  Better/worse - no clear modifiers, patient is ambulatory.  VS: wnl.  Physical Exam: elderly F, uncomfortable appearing.  NCAT, smells of cigarettes, RRR, CTA B, Abd: s/nd/nt.  Ext: R hip skin: post-op skin changes along USAMA scar, skin nontender, ROM is full, but +pain with flexion, no pain with axial load.    Impression:  acute on chronic hip pain without objective evidence of worsening infection. afebrile.  no leukocytosis.  no tachycardia or hypotension.  Patient has known hardware infection, still taking PO abx, and has appointment with Dr. Be next week.  D/w ortho.  Plan:  d/c home, f/u primary ortho, continue abx, return precautions.

## 2019-01-18 NOTE — ED ADULT NURSE NOTE - OBJECTIVE STATEMENT
74 y/o female presented to the ED via EMS from [place] at home. pt states she was walking around at home and "strained her R hip" pt has hx ofCAD, total right hip replacement complicated by MRSA infection and chronic pain presenting with right hip pain worse past 2 days. States she was walking when pain began to worsen 2 days ago. pt states she has been on oral Doxy 100mg BID. Patient states the Tylenol and doxy has been causing upset stomach and nausea. No fever, cp, sob, dizziness, dysuria. pt is A&Ox3, on room air with no signs of distress. able to move all extremities. denies numbness and tingling. pt sx site on R hip is c/d/i, no redness or swelling noted. awaiting MD dispo.

## 2019-01-18 NOTE — ED PROVIDER NOTE - MEDICAL DECISION MAKING DETAILS
73F presenting with chronic hip pain, acutely worsened. Hx of THR with complications including infection.

## 2019-01-23 LAB
CULTURE RESULTS: SIGNIFICANT CHANGE UP
CULTURE RESULTS: SIGNIFICANT CHANGE UP
SPECIMEN SOURCE: SIGNIFICANT CHANGE UP
SPECIMEN SOURCE: SIGNIFICANT CHANGE UP

## 2019-02-13 ENCOUNTER — APPOINTMENT (OUTPATIENT)
Dept: ORTHOPEDIC SURGERY | Facility: CLINIC | Age: 74
End: 2019-02-13
Payer: MEDICARE

## 2019-02-13 VITALS — WEIGHT: 122 LBS | BODY MASS INDEX: 22.45 KG/M2 | HEIGHT: 62 IN

## 2019-02-13 PROCEDURE — 73502 X-RAY EXAM HIP UNI 2-3 VIEWS: CPT | Mod: RT

## 2019-02-13 PROCEDURE — 99214 OFFICE O/P EST MOD 30 MIN: CPT

## 2019-02-19 RX ORDER — DOXYCYCLINE 150 MG/1
150 TABLET, FILM COATED ORAL
Qty: 14 | Refills: 0 | Status: ACTIVE | COMMUNITY
Start: 2019-02-19 | End: 1900-01-01

## 2019-02-20 RX ORDER — DOXYCYCLINE HYCLATE 100 MG/1
100 CAPSULE ORAL
Qty: 60 | Refills: 0 | Status: ACTIVE | COMMUNITY
Start: 2019-02-20 | End: 1900-01-01

## 2019-03-21 ENCOUNTER — APPOINTMENT (OUTPATIENT)
Dept: ORTHOPEDIC SURGERY | Facility: CLINIC | Age: 74
End: 2019-03-21

## 2019-03-22 NOTE — PATIENT PROFILE ADULT. - BLOOD AVOIDANCE/RESTRICTIONS, PROFILE
Quality 226: Preventive Care And Screening: Tobacco Use: Screening And Cessation Intervention: Patient screened for tobacco use and is an ex/non-smoker
Detail Level: Detailed
Quality 110: Preventive Care And Screening: Influenza Immunization: Influenza Immunization previously received during influenza season
Quality 431: Preventive Care And Screening: Unhealthy Alcohol Use - Screening: Patient screened for unhealthy alcohol use using a single question and scores less than 2 times per year
none

## 2019-04-21 ENCOUNTER — INPATIENT (INPATIENT)
Facility: HOSPITAL | Age: 74
LOS: 10 days | Discharge: NURSING, REHAB OR LONG-TERM CARE | DRG: 467 | End: 2019-05-02
Attending: ORTHOPAEDIC SURGERY | Admitting: ORTHOPAEDIC SURGERY
Payer: MEDICARE

## 2019-04-21 VITALS
HEIGHT: 62 IN | WEIGHT: 130.07 LBS | SYSTOLIC BLOOD PRESSURE: 121 MMHG | DIASTOLIC BLOOD PRESSURE: 68 MMHG | RESPIRATION RATE: 16 BRPM | OXYGEN SATURATION: 99 % | HEART RATE: 60 BPM

## 2019-04-21 DIAGNOSIS — Z96.641 PRESENCE OF RIGHT ARTIFICIAL HIP JOINT: Chronic | ICD-10-CM

## 2019-04-21 DIAGNOSIS — S72.009A FRACTURE OF UNSPECIFIED PART OF NECK OF UNSPECIFIED FEMUR, INITIAL ENCOUNTER FOR CLOSED FRACTURE: ICD-10-CM

## 2019-04-21 DIAGNOSIS — Z98.89 OTHER SPECIFIED POSTPROCEDURAL STATES: Chronic | ICD-10-CM

## 2019-04-21 LAB
ALBUMIN SERPL ELPH-MCNC: 3 G/DL — LOW (ref 3.3–5)
ALP SERPL-CCNC: 108 U/L — SIGNIFICANT CHANGE UP (ref 40–120)
ALT FLD-CCNC: 16 U/L — SIGNIFICANT CHANGE UP (ref 10–45)
ANION GAP SERPL CALC-SCNC: 15 MMOL/L — SIGNIFICANT CHANGE UP (ref 5–17)
APTT BLD: 26.2 SEC — LOW (ref 27.5–36.3)
AST SERPL-CCNC: 25 U/L — SIGNIFICANT CHANGE UP (ref 10–40)
BASOPHILS # BLD AUTO: 0 K/UL — SIGNIFICANT CHANGE UP (ref 0–0.2)
BASOPHILS NFR BLD AUTO: 0.1 % — SIGNIFICANT CHANGE UP (ref 0–2)
BILIRUB SERPL-MCNC: 0.3 MG/DL — SIGNIFICANT CHANGE UP (ref 0.2–1.2)
BLD GP AB SCN SERPL QL: NEGATIVE — SIGNIFICANT CHANGE UP
BUN SERPL-MCNC: 44 MG/DL — HIGH (ref 7–23)
CALCIUM SERPL-MCNC: 9.1 MG/DL — SIGNIFICANT CHANGE UP (ref 8.4–10.5)
CHLORIDE SERPL-SCNC: 103 MMOL/L — SIGNIFICANT CHANGE UP (ref 96–108)
CO2 SERPL-SCNC: 17 MMOL/L — LOW (ref 22–31)
CREAT SERPL-MCNC: 1.11 MG/DL — SIGNIFICANT CHANGE UP (ref 0.5–1.3)
CRP SERPL-MCNC: 29.78 MG/DL — HIGH (ref 0–0.4)
EOSINOPHIL # BLD AUTO: 0 K/UL — SIGNIFICANT CHANGE UP (ref 0–0.5)
EOSINOPHIL NFR BLD AUTO: 0.2 % — SIGNIFICANT CHANGE UP (ref 0–6)
GLUCOSE SERPL-MCNC: 94 MG/DL — SIGNIFICANT CHANGE UP (ref 70–99)
HCT VFR BLD CALC: 36.2 % — SIGNIFICANT CHANGE UP (ref 34.5–45)
HGB BLD-MCNC: 11.8 G/DL — SIGNIFICANT CHANGE UP (ref 11.5–15.5)
INR BLD: 1.17 RATIO — HIGH (ref 0.88–1.16)
LYMPHOCYTES # BLD AUTO: 0.7 K/UL — LOW (ref 1–3.3)
LYMPHOCYTES # BLD AUTO: 5.6 % — LOW (ref 13–44)
MCHC RBC-ENTMCNC: 29.6 PG — SIGNIFICANT CHANGE UP (ref 27–34)
MCHC RBC-ENTMCNC: 32.7 GM/DL — SIGNIFICANT CHANGE UP (ref 32–36)
MCV RBC AUTO: 90.5 FL — SIGNIFICANT CHANGE UP (ref 80–100)
MONOCYTES # BLD AUTO: 0.7 K/UL — SIGNIFICANT CHANGE UP (ref 0–0.9)
MONOCYTES NFR BLD AUTO: 5.1 % — SIGNIFICANT CHANGE UP (ref 2–14)
NEUTROPHILS # BLD AUTO: 11.8 K/UL — HIGH (ref 1.8–7.4)
NEUTROPHILS NFR BLD AUTO: 89 % — HIGH (ref 43–77)
PLATELET # BLD AUTO: 405 K/UL — HIGH (ref 150–400)
POTASSIUM SERPL-MCNC: 3.9 MMOL/L — SIGNIFICANT CHANGE UP (ref 3.5–5.3)
POTASSIUM SERPL-SCNC: 3.9 MMOL/L — SIGNIFICANT CHANGE UP (ref 3.5–5.3)
PROT SERPL-MCNC: 7.8 G/DL — SIGNIFICANT CHANGE UP (ref 6–8.3)
PROTHROM AB SERPL-ACNC: 13.4 SEC — HIGH (ref 10–12.9)
RBC # BLD: 4 M/UL — SIGNIFICANT CHANGE UP (ref 3.8–5.2)
RBC # FLD: 15.4 % — HIGH (ref 10.3–14.5)
RH IG SCN BLD-IMP: POSITIVE — SIGNIFICANT CHANGE UP
SODIUM SERPL-SCNC: 135 MMOL/L — SIGNIFICANT CHANGE UP (ref 135–145)
WBC # BLD: 13.2 K/UL — HIGH (ref 3.8–10.5)
WBC # FLD AUTO: 13.2 K/UL — HIGH (ref 3.8–10.5)

## 2019-04-21 PROCEDURE — 93010 ELECTROCARDIOGRAM REPORT: CPT

## 2019-04-21 PROCEDURE — 73552 X-RAY EXAM OF FEMUR 2/>: CPT | Mod: 26,RT

## 2019-04-21 PROCEDURE — 71045 X-RAY EXAM CHEST 1 VIEW: CPT | Mod: 26

## 2019-04-21 PROCEDURE — 99285 EMERGENCY DEPT VISIT HI MDM: CPT | Mod: 25

## 2019-04-21 PROCEDURE — 72170 X-RAY EXAM OF PELVIS: CPT | Mod: 26

## 2019-04-21 PROCEDURE — 73701 CT LOWER EXTREMITY W/DYE: CPT | Mod: 26,RT

## 2019-04-21 RX ORDER — HYDROMORPHONE HYDROCHLORIDE 2 MG/ML
1 INJECTION INTRAMUSCULAR; INTRAVENOUS; SUBCUTANEOUS ONCE
Qty: 0 | Refills: 0 | Status: DISCONTINUED | OUTPATIENT
Start: 2019-04-21 | End: 2019-04-21

## 2019-04-21 RX ORDER — HYDRALAZINE HCL 50 MG
1 TABLET ORAL
Qty: 0 | Refills: 0 | COMMUNITY

## 2019-04-21 RX ORDER — MORPHINE SULFATE 50 MG/1
4 CAPSULE, EXTENDED RELEASE ORAL ONCE
Qty: 0 | Refills: 0 | Status: DISCONTINUED | OUTPATIENT
Start: 2019-04-21 | End: 2019-04-21

## 2019-04-21 RX ORDER — ACETAMINOPHEN WITH CODEINE 300MG-30MG
1 TABLET ORAL
Qty: 0 | Refills: 0 | COMMUNITY

## 2019-04-21 RX ORDER — SODIUM CHLORIDE 9 MG/ML
1000 INJECTION INTRAMUSCULAR; INTRAVENOUS; SUBCUTANEOUS ONCE
Qty: 0 | Refills: 0 | Status: COMPLETED | OUTPATIENT
Start: 2019-04-21 | End: 2019-04-21

## 2019-04-21 RX ORDER — LOSARTAN POTASSIUM 100 MG/1
1 TABLET, FILM COATED ORAL
Qty: 0 | Refills: 0 | COMMUNITY

## 2019-04-21 RX ADMIN — MORPHINE SULFATE 4 MILLIGRAM(S): 50 CAPSULE, EXTENDED RELEASE ORAL at 16:02

## 2019-04-21 RX ADMIN — MORPHINE SULFATE 4 MILLIGRAM(S): 50 CAPSULE, EXTENDED RELEASE ORAL at 17:32

## 2019-04-21 RX ADMIN — MORPHINE SULFATE 4 MILLIGRAM(S): 50 CAPSULE, EXTENDED RELEASE ORAL at 20:44

## 2019-04-21 RX ADMIN — HYDROMORPHONE HYDROCHLORIDE 1 MILLIGRAM(S): 2 INJECTION INTRAMUSCULAR; INTRAVENOUS; SUBCUTANEOUS at 20:46

## 2019-04-21 RX ADMIN — SODIUM CHLORIDE 1000 MILLILITER(S): 9 INJECTION INTRAMUSCULAR; INTRAVENOUS; SUBCUTANEOUS at 16:02

## 2019-04-21 NOTE — CONSULT NOTE ADULT - ASSESSMENT
73F PMH seizure disorder, EtOH abuse, CAD w/ stents (last stent 10 years ago), R THR (2/2 to right hip fracture) course c/b periprostetic fracture in July 2018 with ORIF now here with persistant pain and infection of right hip now to go for operative repair/debridement/antiobiotic spacer.    RSCI score is 1:    Recs to follow. 73F PMH seizure disorder, EtOH abuse, CAD w/ stents (last stent 10 years ago), R THR (2/2 to right hip fracture) course c/b periprostetic fracture in July 2018 with ORIF now here with persistant pain and infection of right hip now to go for operative repair/debridement/antiobiotic spacer.  RSCI score is 1.  Pt is low cardiac risk for surgery.    - will need to confirm if patient is on metoprolol with pharmacy in the am.  If so, c/w beta-blockers perioperatively.  - hold lisinopril in the setting of mild LAI and dehydration; gentle hydration with fluids as appropriate.  - c/w clonidine for risk of reflex htn  - hold lomotil, consider bowel regimen given multiple narcotics and no BM for 3 days  - hold asa periop  - Medicine will follow. 73F PMH seizure disorder (not on medications), EtOH abuse, CAD w/ stents (last stent 10 years ago), R THR (2/2 to right hip fracture) course c/b periprostetic fracture in July 2018 with ORIF now here with persistant pain and infection of right hip now to go for operative repair/debridement/antiobiotic spacer.  Pt is mod to high risk for moderate risk surgery.  Pt is not ambulatory, with METS <4, unclear if this is limited from symptomatic SOB or hip pain.  Because of this, patient would benefit from cardiac clearance or at least a 2D echo prior to surgery for further characterization of cardiac risks if patient does not need to go for emergent procedure for septic joint..    - Pt will need 2D echo prior to surgery.  - Please consider cardiology consult  - will need to confirm if patient is on metoprolol with pharmacy in the am.  If so, c/w beta-blockers perioperatively.  - hold lisinopril in the setting of mild LAI and dehydration; gentle hydration with fluids as appropriate.  - c/w clonidine for risk of reflex htn  - hold lomotil, consider bowel regimen given multiple narcotics and no BM for 3 days  - hold asa periop  - Pt had MRSA bacteremia in the past, will need vanc should abx need to be started prior to procedure if patient shows signs/symptoms of infection.  - Pt needs further testing prior to surgery, unless emergent need for procedure.  - Medicine will follow. 73F PMH seizure disorder (not on medications), EtOH abuse, CAD w/ stents (last stent 10 years ago), R THR (2/2 to right hip fracture) course c/b periprostetic fracture in July 2018 with ORIF now here with persistant pain and infection of right hip now to go for operative repair/debridement/antiobiotic spacer.

## 2019-04-21 NOTE — CONSULT NOTE ADULT - PROBLEM SELECTOR RECOMMENDATION 2
-Pt has SOB on exertion and differential diagnosis includes worsening aortic stenosis versus atypical angina. -Pt has SOB on exertion and differential diagnosis includes worsening aortic stenosis versus atypical angina.  -Check TTE  -Check right leg doppler as this could still be PE  -Despite pitting edema on leg would hold diuresis for now until cards eval. -Pt has SOB on exertion and differential diagnosis includes worsening aortic stenosis versus atypical angina.  -Check TTE  -Would do at least 2 sets of serial CE.   -Check right leg doppler as this could still be PE  -Despite pitting edema on leg would hold diuresis for now until cards eval.

## 2019-04-21 NOTE — CONSULT NOTE ADULT - PROBLEM SELECTOR PROBLEM 5
Alcohol abuse Coronary artery disease involving native coronary artery of native heart without angina pectoris

## 2019-04-21 NOTE — ED ADULT NURSE NOTE - NSIMPLEMENTINTERV_GEN_ALL_ED
Implemented All Universal Safety Interventions:  [place] to call system. Call bell, personal items and telephone within reach. Instruct patient to call for assistance. Room bathroom lighting operational. Non-slip footwear when patient is off stretcher. Physically safe environment: no spills, clutter or unnecessary equipment. Stretcher in lowest position, wheels locked, appropriate side rails in place.

## 2019-04-21 NOTE — CONSULT NOTE ADULT - PROBLEM SELECTOR RECOMMENDATION 8
-Pt is HIGH risk for DVT ppx. Will need chemical ppx eventually but holding bc anticipating surgery. Place on SCD in mean time.

## 2019-04-21 NOTE — ED ADULT NURSE REASSESSMENT NOTE - NS ED NURSE REASSESS COMMENT FT1
asked resident physician to have conversation with patient about pain control. resident physician speaking with patient now.

## 2019-04-21 NOTE — ED ADULT NURSE REASSESSMENT NOTE - NS ED NURSE REASSESS COMMENT FT1
pt states morphine does not help her pain. pt c.o 9/10 pain, states it has not changed.   pt crying out for more medication, MD Bauman made aware and states he will speak with patient about pain control.   pt resting comfortably in bed with call bell in hand.

## 2019-04-21 NOTE — CONSULT NOTE ADULT - PROBLEM SELECTOR RECOMMENDATION 6
-Would place on duoneb q6hr prn.  -Advised to stop smoking. -Reportedly last drink is several years ago. But would check urine toxicology and ETOH level. Monitor for withdrawal. She does not need active [facility] order set.

## 2019-04-21 NOTE — CONSULT NOTE ADULT - PROBLEM SELECTOR PROBLEM 4
Coronary artery disease involving native coronary artery of native heart without angina pectoris Aortic valve stenosis, etiology of cardiac valve disease unspecified

## 2019-04-21 NOTE — CONSULT NOTE ADULT - PROBLEM SELECTOR RECOMMENDATION 5
-Reportedly last drink is several years ago. But would check urine toxicology and ETOH level. Monitor for withdrawal. She does not need active [facility] order set. -Hold aspirin in plans of possible surgery  -Clarify with pt pharmacy if she is on metoprolol because if so will need to be continued perioperatively but will defer to cards.

## 2019-04-21 NOTE — ED PROVIDER NOTE - CARE PROVIDER_API CALL
Megan Be)  Orthopaedic Surgery  [address], [address]  [place] [address]  Phone: [phone]  Fax: [phone]  Follow Up Time:

## 2019-04-21 NOTE — ED PROVIDER NOTE - CARE PLAN
Principal Discharge DX:	Hip fracture Principal Discharge DX:	Infection associated with internal right hip prosthesis, initial encounter

## 2019-04-21 NOTE — ED PROVIDER NOTE - PHYSICAL EXAMINATION
GEN: Well appearing, well nourished, in no apparent distress.  HEAD: NCAT  HEENT: PERRL, Airway patent, EOMI, non-erythematous pharynx, no exudates, uvula midline, MMM, neck supple, no LAD, no JVD  LUNG: CTAB, no adventitious sounds, no retractions, no nasal flaring  CV: RRR, no murmurs,   Abd: soft, NTND, no rebound or guarding, BS+ in all quadrants, no CVAT  MSK: WWP, Pulses 2+ in extremities, R hip with edema, redness, warm to touch, TTP at R hip, no knee or ankle pain with mvmt, strength 5/5 in all extremities except R hip limited by pain   Neuro:  AAOx3, non Ambulatory, sensations in tact, hips stable   Skin: Warm and dry, no evidence of rash  Psych: normal mood and affect

## 2019-04-21 NOTE — ED PROVIDER NOTE - OBJECTIVE STATEMENT
74 yo Female w/  h/o seizure disorder, EtOH abuse, CAD with stent, s/p R total hip replacement and revision in August 2018 by Dr Cornell with complicated course requiring PICC line and abx while in rehab now presents with R hip pain, redness, swelling of R hip a/w difficulty ambulating. Pt endorses chills without measure temperature. She denies fall or trauma, HA, LOC, paresthesias, numbness, N/V/D, abdominal pain, CP, SOB, cough, palpitations.

## 2019-04-21 NOTE — CONSULT NOTE ADULT - PROBLEM SELECTOR RECOMMENDATION 3
-Check TTE , see above -Patient's creatinine earlier was 0.53 and on exam she is dehydrated, dry oral mucosa, dec cap refill. Will need IVF challenge but would be cautious. Pt got 1L NS in ER. Would repeat BMP in AM. Renally dose meds  -For pain would avoid NSAIDs.

## 2019-04-21 NOTE — ED ADULT NURSE REASSESSMENT NOTE - NS ED NURSE REASSESS COMMENT FT1
pt states "I don't feel the morphine going in". Educated patient on timing of morphine onset and that pain medication may take time to work.   Pt states that all she feels is pain in her right hip and that her infection is spreading. encouraged patient to take some deep breaths and listen to music for distraction. pt asking for her bag and glasses, bag and glasses provided. pt continues to say "this is not enough pain medication for me". Pt unable to explain which medication provides pain relief.     Discussion with resident physician in progress about pain control, pending orders

## 2019-04-21 NOTE — ED ADULT NURSE REASSESSMENT NOTE - NS ED NURSE REASSESS COMMENT FT1
19:05. Report received from WILLIE Todd. Pt AAOx4, NAD, resp nonlabored, skin warm/dry, resting comfortably in bed. Pt denies headache, dizziness, chest pain, palpitations, SOB, abd pain, n/v/d, & urinary symptoms at this time. Blood work drawn and sent to lab at this time. Pt awaiting results. Pt provided with dinner tray at this time. Safety maintained. 19:05. Report received from WILLIE Todd. Pt AAOx4, NAD, resp nonlabored, skin warm/dry, resting comfortably in bed. Pt denies headache, dizziness, chest pain, palpitations, SOB, abd pain, n/v/d, & urinary symptoms at this time. Blood work drawn and sent to lab at this time. Pt awaiting results & to be evaluated by orthopedic team. Pt provided with dinner tray at this time. Safety maintained.

## 2019-04-21 NOTE — ED PROVIDER NOTE - CLINICAL SUMMARY MEDICAL DECISION MAKING FREE TEXT BOX
R hip pain, redness and swelling likely infected, xray hip, send blood cx, esr with crp, cbc, consult ortho. get rectal temp

## 2019-04-21 NOTE — CONSULT NOTE ADULT - ATTENDING COMMENTS
Patient assigned to me by night hospitalist in charge for management and care for patient for this evening only as a medical consultant. Patient's direct care is under orthopedic service ANGIE Palma.     Patient seen and examined at bedside. Agree with resident note above. Changes made to note above where appropriate. Pt is NOT medically optimized for surgery that is pending tomorrow. She will need cardiology evaluation, preferably by Dr. Cabrera as he has seen patient in past. She will need at the very least TTE. Case d/w Orthopedic resident overnight.

## 2019-04-21 NOTE — ED PROVIDER NOTE - ATTENDING CONTRIBUTION TO CARE
Patient with history of alcohol abuse, Coronary Artery Disease, right hip replacement [facility] 2015, revised 2017, again in 6/30/18 by Dr. Be with subsequent infections July and August 2018 after the right hip infection and requiring multiple courses of antibiotics. Patient is here secondary to pain and swelling of the right hip similar to prior infections. No measured fevers, no trauma. Patient had revision 8/1/18 by Dr. Cornell. 8/20/18 presents with sepsis + culture for MRSA. Moderate to severe pain, patient is unable to walk.   right hip red, warm to touch, swollen over the right greater trochanter Patient with history of alcohol abuse, Coronary Artery Disease, right hip replacement [facility] 2015, revised 2017, again in 6/30/18 by Dr. Be with subsequent infections July and August 2018 after the right hip infection and requiring multiple courses of antibiotics. Patient is here secondary to pain and swelling of the right hip similar to prior infections. No measured fevers, no trauma. Patient had revision 8/1/18 by Dr. Cornell. 8/20/18 presents with sepsis + culture for MRSA. Moderate to severe pain, patient is unable to walk.   right hip red, warm to touch, swollen over the right greater trochanter with moderate fluid collection underneath  concern for infection to right hip with pain  will get iv, cbc, cmp, esr, crp, xray hip, ortho consult, blood cultures ua, urine culture, and admit  Will follow up on labs, analgesia, imaging, reassess and disposition to the inpatient team as clinically indicated.

## 2019-04-21 NOTE — CONSULT NOTE ADULT - PROBLEM SELECTOR RECOMMENDATION 4
-Hold aspirin in plans of possible surgery  -Clarify with pt pharmacy if she is on metoprolol because if so will need to be continued perioperatively but will defer to cards. -Check TTE , see above

## 2019-04-21 NOTE — ED ADULT NURSE NOTE - OBJECTIVE STATEMENT
73 y.o female brought in by ems for right hip pain, no history of injury. hip replacement six months ago at [facility], infection of MRSA in hip in December. 73 y.o female brought in by ems for right hip pain, no history of injury. hip replacement six months ago at [facility], infection of MRSA. Pt states she has had increased pain for the last few days, has not taken anything for pain. pt is ambulatory on her own at home. Patient fully undressed, right hip red with swelling and warm to the touch. pt is afebrile and reports no fever at home. hx of abdominal stents and smoker for 50+ years 1.5 ppd. denies history of poor wound healing/diabetes. pt is moving all extremities.   Patient undressed and placed into gown, call bell in hand and side rails up for safety. warm blanket provided, vital signs stable, pt in no acute distress.

## 2019-04-21 NOTE — CONSULT NOTE ADULT - PROBLEM SELECTOR RECOMMENDATION 9
-Patient is presenting with signs & sx concerning for possible septic joint. She has leukocytosis, subjective fevers, and markedly elevated CRP. Her physical exam supports likely an underlying abscess. CT pending results.   -Medicine was consulted for preoperative management recommendations and optimization for planned OR tomorrow (unclear what time) for girdle procedure. Patient is substantially high cardiac risk for this procedure given her hx of active smoking, prior cardiac stents, at least moderate AS from TTE in May 2018, and now new T-wave inversion in inferior leads. On chart review she had cardiology evaluation prior to right hip surgery just 1-year ago and required TTE and nuclear stress test before surgery. Patient will need cardiology consultation for medical optimization, as she is NOT medically cleared.   -Orthopedics to consider arthrocentesis for synovial fluid analysis, culture data, and need to start antibiotics. Would recommend ID evaluation.   -Pain control  -Agree with right leg doppler  -Unable to reach daughter Brittni by phone who is a PA at [facility]. Please call in AM. -Patient is presenting with signs & sx concerning for possible septic joint. She has leukocytosis, subjective fevers, and markedly elevated CRP. Her physical exam supports likely an underlying abscess. CT pending results.   -Medicine was consulted for preoperative management recommendations and optimization for planned OR tomorrow (unclear what time) for girdle procedure. Patient is substantially high cardiac risk for this procedure given her hx of active smoking, prior cardiac stents, at least moderate AS from TTE in May 2018, and now new T-wave inversion in inferior leads. Her GRAY can be multifactorial but one important consideration is if the moderate AS is now severe in nature. On chart review she had cardiology evaluation prior to right hip surgery just 1-year ago and required TTE and nuclear stress test before surgery. Patient will need cardiology consultation for medical optimization, as she is NOT medically cleared.   -Orthopedics to consider arthrocentesis for synovial fluid analysis, culture data, and need to start antibiotics. Would recommend ID evaluation.   -Pain control  -Agree with right leg doppler  -Unable to reach daughter Brittni by phone who is a PA at [employer] [facility]. Please call in AM. -Patient is presenting with signs & sx concerning for possible septic joint. She has leukocytosis, subjective fevers, and markedly elevated CRP. Her physical exam supports likely an underlying abscess. CT pending results.   -Medicine was consulted for preoperative management recommendations and optimization for planned OR tomorrow (unclear what time) for girdle procedure. Patient is substantially high cardiac risk for this procedure given her hx of active smoking, prior cardiac stents, at least moderate AS from TTE in May 2018, and now new T-wave inversion in inferior leads. Her GRAY can be multifactorial but one important consideration is if the moderate AS is now severe in nature. On chart review she had cardiology evaluation prior to right hip surgery just 1-year ago and required TTE and nuclear stress test before surgery. Patient will need cardiology consultation for medical optimization, as she is NOT medically cleared.   -Orthopedics to consider arthrocentesis for synovial fluid analysis, culture data, and need to start antibiotics. Would recommend ID evaluation.   -Trend ESR and CRP.   -Pain control  -Agree with right leg doppler  -Unable to reach daughter Brittni by phone who is a PA at [facility]. Please call in AM. -Patient is presenting with signs & sx concerning for possible septic joint. She has leukocytosis, subjective fevers, and markedly elevated CRP. Her physical exam supports likely an underlying abscess. CT pending results.   -Medicine was consulted for preoperative management recommendations and optimization for planned OR tomorrow (unclear what time) for girdle procedure. Patient is substantially high cardiac risk for this procedure given her hx of active smoking, prior cardiac stents, at least moderate AS from TTE in May 2018, and now new T-wave inversion in inferior leads. Her GRAY can be multifactorial but one important consideration is if the moderate AS is now severe in nature. On chart review she had cardiology evaluation prior to right hip surgery just 1-year ago and required TTE and nuclear stress test before surgery. Patient will need cardiology consultation for medical optimization, as she is NOT medically cleared.   -Orthopedics to consider arthrocentesis for synovial fluid analysis, culture data, and need to start antibiotics. Would recommend ID evaluation. Agree with BCX x2.   -Ortho to follow up on the fracture read  -Trend ESR and CRP.   -Pain control  -Agree with right leg doppler  -Unable to reach daughter Brittni by phone who is a PA at [facility]. Please call in AM.

## 2019-04-21 NOTE — ED PROVIDER NOTE - NS ED ROS FT
Constitutional: no fevers, no chills.  Eyes: no visual changes.  Ears: no ear drainage, no ear pain.  Nose: no nasal congestion.  Mouth/Throat: no sore throat.  Cardiovascular: no chest pain.  Respiratory: no shortness of breath, no wheezing, no cough  Gastrointestinal: no nausea, no vomiting, no diarrhea, no abdominal pain.  MSK: no flank pain, no back pain. +R hip pain and redness   Genitourinary: no dysuria, no hematuria.  Skin: no rashes.  Neuro: no headache,   Psychiatric: no known mental health issues.

## 2019-04-21 NOTE — CONSULT NOTE ADULT - PROBLEM SELECTOR RECOMMENDATION 7
-Pt is HIGH risk for DVT ppx. Will need chemical ppx eventually but holding bc anticipating surgery. Place on SCD in mean time. -Would place on duoneb q6hr prn.  -Advised to stop smoking. Not interested in quitting at this time. Defers NRT.

## 2019-04-22 ENCOUNTER — TRANSCRIPTION ENCOUNTER (OUTPATIENT)
Age: 74
End: 2019-04-22

## 2019-04-22 DIAGNOSIS — Z29.9 ENCOUNTER FOR PROPHYLACTIC MEASURES, UNSPECIFIED: ICD-10-CM

## 2019-04-22 DIAGNOSIS — N17.9 ACUTE KIDNEY FAILURE, UNSPECIFIED: ICD-10-CM

## 2019-04-22 DIAGNOSIS — I35.0 NONRHEUMATIC AORTIC (VALVE) STENOSIS: ICD-10-CM

## 2019-04-22 DIAGNOSIS — F10.10 ALCOHOL ABUSE, UNCOMPLICATED: ICD-10-CM

## 2019-04-22 DIAGNOSIS — M00.9 PYOGENIC ARTHRITIS, UNSPECIFIED: ICD-10-CM

## 2019-04-22 DIAGNOSIS — I25.10 ATHEROSCLEROTIC HEART DISEASE OF NATIVE CORONARY ARTERY WITHOUT ANGINA PECTORIS: ICD-10-CM

## 2019-04-22 DIAGNOSIS — R06.02 SHORTNESS OF BREATH: ICD-10-CM

## 2019-04-22 DIAGNOSIS — J44.9 CHRONIC OBSTRUCTIVE PULMONARY DISEASE, UNSPECIFIED: ICD-10-CM

## 2019-04-22 LAB
ANION GAP SERPL CALC-SCNC: 14 MMOL/L — SIGNIFICANT CHANGE UP (ref 5–17)
APTT BLD: 30.5 SEC — SIGNIFICANT CHANGE UP (ref 27.5–36.3)
BASOPHILS # BLD AUTO: 0 K/UL — SIGNIFICANT CHANGE UP (ref 0–0.2)
BASOPHILS NFR BLD AUTO: 0.1 % — SIGNIFICANT CHANGE UP (ref 0–2)
BLD GP AB SCN SERPL QL: NEGATIVE — SIGNIFICANT CHANGE UP
BUN SERPL-MCNC: 23 MG/DL — SIGNIFICANT CHANGE UP (ref 7–23)
CALCIUM SERPL-MCNC: 8.9 MG/DL — SIGNIFICANT CHANGE UP (ref 8.4–10.5)
CHLORIDE SERPL-SCNC: 107 MMOL/L — SIGNIFICANT CHANGE UP (ref 96–108)
CO2 SERPL-SCNC: 19 MMOL/L — LOW (ref 22–31)
CREAT SERPL-MCNC: 0.67 MG/DL — SIGNIFICANT CHANGE UP (ref 0.5–1.3)
EOSINOPHIL # BLD AUTO: 0 K/UL — SIGNIFICANT CHANGE UP (ref 0–0.5)
EOSINOPHIL NFR BLD AUTO: 0.3 % — SIGNIFICANT CHANGE UP (ref 0–6)
GLUCOSE SERPL-MCNC: 85 MG/DL — SIGNIFICANT CHANGE UP (ref 70–99)
HCT VFR BLD CALC: 32.8 % — LOW (ref 34.5–45)
HGB BLD-MCNC: 10.9 G/DL — LOW (ref 11.5–15.5)
INR BLD: 1.14 RATIO — SIGNIFICANT CHANGE UP (ref 0.88–1.16)
LYMPHOCYTES # BLD AUTO: 0.4 K/UL — LOW (ref 1–3.3)
LYMPHOCYTES # BLD AUTO: 3.9 % — LOW (ref 13–44)
MCHC RBC-ENTMCNC: 30 PG — SIGNIFICANT CHANGE UP (ref 27–34)
MCHC RBC-ENTMCNC: 33.3 GM/DL — SIGNIFICANT CHANGE UP (ref 32–36)
MCV RBC AUTO: 90 FL — SIGNIFICANT CHANGE UP (ref 80–100)
MONOCYTES # BLD AUTO: 0.7 K/UL — SIGNIFICANT CHANGE UP (ref 0–0.9)
MONOCYTES NFR BLD AUTO: 7.2 % — SIGNIFICANT CHANGE UP (ref 2–14)
NEUTROPHILS # BLD AUTO: 8.6 K/UL — HIGH (ref 1.8–7.4)
NEUTROPHILS NFR BLD AUTO: 88.5 % — HIGH (ref 43–77)
PLATELET # BLD AUTO: 346 K/UL — SIGNIFICANT CHANGE UP (ref 150–400)
POTASSIUM SERPL-MCNC: 3.4 MMOL/L — LOW (ref 3.5–5.3)
POTASSIUM SERPL-SCNC: 3.4 MMOL/L — LOW (ref 3.5–5.3)
PROTHROM AB SERPL-ACNC: 13.1 SEC — HIGH (ref 10–12.9)
RBC # BLD: 3.64 M/UL — LOW (ref 3.8–5.2)
RBC # FLD: 15.4 % — HIGH (ref 10.3–14.5)
RH IG SCN BLD-IMP: POSITIVE — SIGNIFICANT CHANGE UP
SODIUM SERPL-SCNC: 140 MMOL/L — SIGNIFICANT CHANGE UP (ref 135–145)
TROPONIN T, HIGH SENSITIVITY RESULT: 44 NG/L — SIGNIFICANT CHANGE UP (ref 0–51)
TROPONIN T, HIGH SENSITIVITY RESULT: 47 NG/L — SIGNIFICANT CHANGE UP (ref 0–51)
WBC # BLD: 9.7 K/UL — SIGNIFICANT CHANGE UP (ref 3.8–10.5)
WBC # FLD AUTO: 9.7 K/UL — SIGNIFICANT CHANGE UP (ref 3.8–10.5)

## 2019-04-22 PROCEDURE — 93306 TTE W/DOPPLER COMPLETE: CPT | Mod: 26

## 2019-04-22 PROCEDURE — 93312 ECHO TRANSESOPHAGEAL: CPT | Mod: 26

## 2019-04-22 PROCEDURE — 99223 1ST HOSP IP/OBS HIGH 75: CPT | Mod: GC

## 2019-04-22 PROCEDURE — 99223 1ST HOSP IP/OBS HIGH 75: CPT

## 2019-04-22 RX ORDER — CHLORHEXIDINE GLUCONATE 213 G/1000ML
1 SOLUTION TOPICAL ONCE
Qty: 0 | Refills: 0 | Status: COMPLETED | OUTPATIENT
Start: 2019-04-23 | End: 2019-04-23

## 2019-04-22 RX ORDER — SODIUM CHLORIDE 9 MG/ML
1000 INJECTION, SOLUTION INTRAVENOUS
Qty: 0 | Refills: 0 | Status: DISCONTINUED | OUTPATIENT
Start: 2019-04-22 | End: 2019-04-23

## 2019-04-22 RX ORDER — VANCOMYCIN HCL 1 G
1000 VIAL (EA) INTRAVENOUS ONCE
Qty: 0 | Refills: 0 | Status: DISCONTINUED | OUTPATIENT
Start: 2019-04-22 | End: 2019-04-22

## 2019-04-22 RX ORDER — HYDROMORPHONE HYDROCHLORIDE 2 MG/ML
2 INJECTION INTRAMUSCULAR; INTRAVENOUS; SUBCUTANEOUS ONCE
Qty: 0 | Refills: 0 | Status: DISCONTINUED | OUTPATIENT
Start: 2019-04-22 | End: 2019-04-22

## 2019-04-22 RX ORDER — VANCOMYCIN HCL 1 G
VIAL (EA) INTRAVENOUS
Qty: 0 | Refills: 0 | Status: DISCONTINUED | OUTPATIENT
Start: 2019-04-22 | End: 2019-04-22

## 2019-04-22 RX ORDER — HEPARIN SODIUM 5000 [USP'U]/ML
5000 INJECTION INTRAVENOUS; SUBCUTANEOUS EVERY 8 HOURS
Qty: 0 | Refills: 0 | Status: COMPLETED | OUTPATIENT
Start: 2019-04-22 | End: 2019-04-22

## 2019-04-22 RX ORDER — ASPIRIN/CALCIUM CARB/MAGNESIUM 324 MG
81 TABLET ORAL DAILY
Qty: 0 | Refills: 0 | Status: DISCONTINUED | OUTPATIENT
Start: 2019-04-22 | End: 2019-04-23

## 2019-04-22 RX ORDER — OXYCODONE HYDROCHLORIDE 5 MG/1
5 TABLET ORAL EVERY 4 HOURS
Qty: 0 | Refills: 0 | Status: DISCONTINUED | OUTPATIENT
Start: 2019-04-22 | End: 2019-04-22

## 2019-04-22 RX ORDER — DOCUSATE SODIUM 100 MG
100 CAPSULE ORAL THREE TIMES A DAY
Qty: 0 | Refills: 0 | Status: DISCONTINUED | OUTPATIENT
Start: 2019-04-22 | End: 2019-04-23

## 2019-04-22 RX ORDER — ACETAMINOPHEN 500 MG
975 TABLET ORAL EVERY 8 HOURS
Qty: 0 | Refills: 0 | Status: DISCONTINUED | OUTPATIENT
Start: 2019-04-22 | End: 2019-04-23

## 2019-04-22 RX ORDER — MORPHINE SULFATE 50 MG/1
15 CAPSULE, EXTENDED RELEASE ORAL EVERY 8 HOURS
Qty: 0 | Refills: 0 | Status: DISCONTINUED | OUTPATIENT
Start: 2019-04-22 | End: 2019-04-23

## 2019-04-22 RX ORDER — OXYCODONE HYDROCHLORIDE 5 MG/1
10 TABLET ORAL EVERY 4 HOURS
Qty: 0 | Refills: 0 | Status: DISCONTINUED | OUTPATIENT
Start: 2019-04-22 | End: 2019-04-22

## 2019-04-22 RX ORDER — SODIUM CHLORIDE 9 MG/ML
1000 INJECTION INTRAMUSCULAR; INTRAVENOUS; SUBCUTANEOUS
Qty: 0 | Refills: 0 | Status: COMPLETED | OUTPATIENT
Start: 2019-04-22 | End: 2019-04-23

## 2019-04-22 RX ORDER — HYDROMORPHONE HYDROCHLORIDE 2 MG/ML
1 INJECTION INTRAMUSCULAR; INTRAVENOUS; SUBCUTANEOUS ONCE
Qty: 0 | Refills: 0 | Status: DISCONTINUED | OUTPATIENT
Start: 2019-04-22 | End: 2019-04-22

## 2019-04-22 RX ORDER — OXYCODONE HYDROCHLORIDE 5 MG/1
5 TABLET ORAL
Qty: 0 | Refills: 0 | Status: DISCONTINUED | OUTPATIENT
Start: 2019-04-22 | End: 2019-04-23

## 2019-04-22 RX ORDER — POTASSIUM CHLORIDE 20 MEQ
20 PACKET (EA) ORAL
Qty: 0 | Refills: 0 | Status: COMPLETED | OUTPATIENT
Start: 2019-04-22 | End: 2019-04-22

## 2019-04-22 RX ORDER — HYDROMORPHONE HYDROCHLORIDE 2 MG/ML
1 INJECTION INTRAMUSCULAR; INTRAVENOUS; SUBCUTANEOUS
Qty: 0 | Refills: 0 | Status: DISCONTINUED | OUTPATIENT
Start: 2019-04-22 | End: 2019-04-22

## 2019-04-22 RX ORDER — IPRATROPIUM/ALBUTEROL SULFATE 18-103MCG
3 AEROSOL WITH ADAPTER (GRAM) INHALATION EVERY 6 HOURS
Qty: 0 | Refills: 0 | Status: DISCONTINUED | OUTPATIENT
Start: 2019-04-22 | End: 2019-04-23

## 2019-04-22 RX ORDER — LABETALOL HCL 100 MG
100 TABLET ORAL THREE TIMES A DAY
Qty: 0 | Refills: 0 | Status: DISCONTINUED | OUTPATIENT
Start: 2019-04-22 | End: 2019-04-23

## 2019-04-22 RX ORDER — PANTOPRAZOLE SODIUM 20 MG/1
40 TABLET, DELAYED RELEASE ORAL
Qty: 0 | Refills: 0 | Status: DISCONTINUED | OUTPATIENT
Start: 2019-04-22 | End: 2019-04-23

## 2019-04-22 RX ORDER — SENNA PLUS 8.6 MG/1
2 TABLET ORAL AT BEDTIME
Qty: 0 | Refills: 0 | Status: DISCONTINUED | OUTPATIENT
Start: 2019-04-22 | End: 2019-04-23

## 2019-04-22 RX ORDER — LISINOPRIL 2.5 MG/1
10 TABLET ORAL DAILY
Qty: 0 | Refills: 0 | Status: DISCONTINUED | OUTPATIENT
Start: 2019-04-22 | End: 2019-04-23

## 2019-04-22 RX ORDER — ACETAMINOPHEN 500 MG
1000 TABLET ORAL ONCE
Qty: 0 | Refills: 0 | Status: COMPLETED | OUTPATIENT
Start: 2019-04-22 | End: 2019-04-22

## 2019-04-22 RX ORDER — KETOROLAC TROMETHAMINE 30 MG/ML
15 SYRINGE (ML) INJECTION ONCE
Qty: 0 | Refills: 0 | Status: DISCONTINUED | OUTPATIENT
Start: 2019-04-22 | End: 2019-04-22

## 2019-04-22 RX ORDER — OXYCODONE HYDROCHLORIDE 5 MG/1
10 TABLET ORAL
Qty: 0 | Refills: 0 | Status: DISCONTINUED | OUTPATIENT
Start: 2019-04-22 | End: 2019-04-23

## 2019-04-22 RX ORDER — AMLODIPINE BESYLATE 2.5 MG/1
10 TABLET ORAL DAILY
Qty: 0 | Refills: 0 | Status: DISCONTINUED | OUTPATIENT
Start: 2019-04-22 | End: 2019-04-23

## 2019-04-22 RX ADMIN — HYDROMORPHONE HYDROCHLORIDE 2 MILLIGRAM(S): 2 INJECTION INTRAMUSCULAR; INTRAVENOUS; SUBCUTANEOUS at 19:58

## 2019-04-22 RX ADMIN — Medication 100 MILLIGRAM(S): at 13:09

## 2019-04-22 RX ADMIN — PANTOPRAZOLE SODIUM 40 MILLIGRAM(S): 20 TABLET, DELAYED RELEASE ORAL at 05:35

## 2019-04-22 RX ADMIN — Medication 975 MILLIGRAM(S): at 21:46

## 2019-04-22 RX ADMIN — Medication 100 MILLIGRAM(S): at 05:35

## 2019-04-22 RX ADMIN — Medication 100 MILLIGRAM(S): at 13:08

## 2019-04-22 RX ADMIN — Medication 975 MILLIGRAM(S): at 06:00

## 2019-04-22 RX ADMIN — Medication 20 MILLIEQUIVALENT(S): at 08:39

## 2019-04-22 RX ADMIN — Medication 975 MILLIGRAM(S): at 22:10

## 2019-04-22 RX ADMIN — OXYCODONE HYDROCHLORIDE 10 MILLIGRAM(S): 5 TABLET ORAL at 12:04

## 2019-04-22 RX ADMIN — Medication 15 MILLIGRAM(S): at 11:33

## 2019-04-22 RX ADMIN — Medication 1000 MILLIGRAM(S): at 18:39

## 2019-04-22 RX ADMIN — Medication 975 MILLIGRAM(S): at 05:35

## 2019-04-22 RX ADMIN — MORPHINE SULFATE 15 MILLIGRAM(S): 50 CAPSULE, EXTENDED RELEASE ORAL at 22:10

## 2019-04-22 RX ADMIN — LISINOPRIL 10 MILLIGRAM(S): 2.5 TABLET ORAL at 07:45

## 2019-04-22 RX ADMIN — OXYCODONE HYDROCHLORIDE 10 MILLIGRAM(S): 5 TABLET ORAL at 11:34

## 2019-04-22 RX ADMIN — OXYCODONE HYDROCHLORIDE 10 MILLIGRAM(S): 5 TABLET ORAL at 18:42

## 2019-04-22 RX ADMIN — OXYCODONE HYDROCHLORIDE 10 MILLIGRAM(S): 5 TABLET ORAL at 08:33

## 2019-04-22 RX ADMIN — MORPHINE SULFATE 15 MILLIGRAM(S): 50 CAPSULE, EXTENDED RELEASE ORAL at 21:44

## 2019-04-22 RX ADMIN — OXYCODONE HYDROCHLORIDE 10 MILLIGRAM(S): 5 TABLET ORAL at 18:18

## 2019-04-22 RX ADMIN — HEPARIN SODIUM 5000 UNIT(S): 5000 INJECTION INTRAVENOUS; SUBCUTANEOUS at 21:45

## 2019-04-22 RX ADMIN — OXYCODONE HYDROCHLORIDE 10 MILLIGRAM(S): 5 TABLET ORAL at 09:03

## 2019-04-22 RX ADMIN — Medication 400 MILLIGRAM(S): at 18:23

## 2019-04-22 RX ADMIN — Medication 0.1 MILLIGRAM(S): at 05:35

## 2019-04-22 RX ADMIN — Medication 975 MILLIGRAM(S): at 13:38

## 2019-04-22 RX ADMIN — SODIUM CHLORIDE 75 MILLILITER(S): 9 INJECTION INTRAMUSCULAR; INTRAVENOUS; SUBCUTANEOUS at 01:17

## 2019-04-22 RX ADMIN — Medication 100 MILLIGRAM(S): at 21:44

## 2019-04-22 RX ADMIN — HYDROMORPHONE HYDROCHLORIDE 1 MILLIGRAM(S): 2 INJECTION INTRAMUSCULAR; INTRAVENOUS; SUBCUTANEOUS at 01:14

## 2019-04-22 RX ADMIN — HYDROMORPHONE HYDROCHLORIDE 1 MILLIGRAM(S): 2 INJECTION INTRAMUSCULAR; INTRAVENOUS; SUBCUTANEOUS at 12:55

## 2019-04-22 RX ADMIN — HYDROMORPHONE HYDROCHLORIDE 1 MILLIGRAM(S): 2 INJECTION INTRAMUSCULAR; INTRAVENOUS; SUBCUTANEOUS at 01:00

## 2019-04-22 RX ADMIN — MORPHINE SULFATE 15 MILLIGRAM(S): 50 CAPSULE, EXTENDED RELEASE ORAL at 13:38

## 2019-04-22 RX ADMIN — HYDROMORPHONE HYDROCHLORIDE 2 MILLIGRAM(S): 2 INJECTION INTRAMUSCULAR; INTRAVENOUS; SUBCUTANEOUS at 20:45

## 2019-04-22 RX ADMIN — MORPHINE SULFATE 15 MILLIGRAM(S): 50 CAPSULE, EXTENDED RELEASE ORAL at 13:08

## 2019-04-22 RX ADMIN — AMLODIPINE BESYLATE 10 MILLIGRAM(S): 2.5 TABLET ORAL at 05:35

## 2019-04-22 RX ADMIN — Medication 15 MILLIGRAM(S): at 11:52

## 2019-04-22 RX ADMIN — Medication 975 MILLIGRAM(S): at 13:08

## 2019-04-22 RX ADMIN — HYDROMORPHONE HYDROCHLORIDE 1 MILLIGRAM(S): 2 INJECTION INTRAMUSCULAR; INTRAVENOUS; SUBCUTANEOUS at 13:10

## 2019-04-22 NOTE — PROGRESS NOTE ADULT - PROBLEM SELECTOR PLAN 1
Bedrest  NPO/IVF  continue antihypertensives  replete potassium  IS  DVT PPx held for surgery  Cardiology c/s for surgical clearance  Pain Control  Continue Current Tx.  OR today    Justino Lara PA-C  Team Pager: #[phone]

## 2019-04-22 NOTE — CONSULT NOTE ADULT - ASSESSMENT
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 THR, kika-prosthetic R femur fx (s/p Total Hip revision with ORIF, 6/30/18), recent re-admission (07/16 - 07/24/18) for worsening R hip pain and decreased ability to ambulate now s/p R femur [place] B1 periprosthetic fracture ORIF (07/19/18) admitted 8/2018 with MRSA bacteremia and hardware collection s/p washout but the hardware was not removed, OR cxs also with MRSA she completed a course of vanco and then bactrim for suppressive therapy but again  presented with hip erythema and edema again the hardware was not removed in hope for femur union, and she received another course of vanco and then doxy and symptoms started after she finished the antibiotics and came back with fever, chills, erythema edema and fluctuation on the hip incision  here afebrile WBC normal  CT with lucency, fractures and cellulitis      hardware infection previously MRSA bacteremia and prosthetic joint infection with cellulitis and ?abscess  s/p 2 courses of IV vanco and again recurrence    * will need hardware removal  * f/u the blood cx  * start vanco q 12  * f/u the OR cx  * check the vanco trough before the 4th dose

## 2019-04-22 NOTE — ED ADULT NURSE REASSESSMENT NOTE - NS ED NURSE REASSESS COMMENT FT1
00:55. Pt woke up from sleeping at this time. Pt stating "I need a nurse at this time. I am in so much pain. I can't bear it".

## 2019-04-22 NOTE — CONSULT NOTE ADULT - SUBJECTIVE AND OBJECTIVE BOX
HPI:  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 THR, kika-prosthetic R femur fx (s/p Total Hip revision with ORIF, 6/30/18), recent re-admission (07/16 - 07/24/18) for worsening R hip pain and decreased ability to ambulate now s/p R femur [place] B1 periprosthetic fracture ORIF (07/19/18) admitted 8/2018 with MRSA bacteremia and hardware collection s/p washout but the hardware was not removed, OR cxs also with MRSA she completed a course of vanco and then bactrim for suppressive therapy but again  presented with hip erythema and edema again the hardware was not removed in hope for femur union, and she received another course of vanco and then doxy and symptoms started after she finished the antibiotics and came back with fever, chills, erythema edema and fluctuation on the hip incision  here afebrile WBC normal  CT with lucency, fractures and cellulitis        PAST MEDICAL & SURGICAL HISTORY:  Pain of right hip joint  Migraine  Alcohol abuse  Seizure  Stented coronary artery  Coronary artery disease  Anxiety  HTN (hypertension)  Emphysema, unspecified  Anxiety  Migraine  CAD (coronary artery disease)  Hyperlipidemia  Hypertension  Status post total hip replacement, right: 5/21/18  S/P bladder repair      Allergies    No Known Allergies    Intolerances        ANTIMICROBIALS:      OTHER MEDS:  acetaminophen   Tablet .. 975 milliGRAM(s) Oral every 8 hours  ALBUTerol/ipratropium for Nebulization 3 milliLiter(s) Nebulizer every 6 hours PRN  amLODIPine   Tablet 10 milliGRAM(s) Oral daily  aspirin enteric coated 81 milliGRAM(s) Oral daily  cloNIDine 0.1 milliGRAM(s) Oral daily  docusate sodium 100 milliGRAM(s) Oral three times a day  heparin  Injectable 5000 Unit(s) SubCutaneous every 8 hours  labetalol 100 milliGRAM(s) Oral three times a day  lactated ringers. 1000 milliLiter(s) IV Continuous <Continuous>  lisinopril 10 milliGRAM(s) Oral daily  morphine ER Tablet 15 milliGRAM(s) Oral every 8 hours  oxyCODONE    IR 5 milliGRAM(s) Oral every 3 hours PRN  oxyCODONE    IR 10 milliGRAM(s) Oral every 3 hours PRN  pantoprazole    Tablet 40 milliGRAM(s) Oral before breakfast  senna 2 Tablet(s) Oral at bedtime  sodium chloride 0.9%. 1000 milliLiter(s) IV Continuous <Continuous>      SOCIAL HISTORY:  , lives with her   current smoking, h/o alcohol abuse, no drug abuse  no recent travel    FAMILY HISTORY:  Family history of coronary artery disease in daughter (Child)      ROS:    All other systems negative     Constitutional: + fever, + chills  Eye: no eye pain, no redness, no vision changes  ENT:  no sore throat, no rhinorrhea  Cardiovascular:  no chest pain, no palpitation  Respiratory:  no SOB, no cough  GI:  no abd pain, no vomiting, no diarrhea  urinary: no dysuria, no hematuria, no flank pain  : no  discharge or bleeding  musculoskeletal:  R hip pain, swelling and difficulty walking   skin:  no rash  neurology:  no headache, no seizure, no change in mental status  psych: no anxiety, no depression     Physical Exam:    General:    NAD, non toxic  Head: atraumatic, normocephalic  Eyes: normal sclera and conjunctiva  ENT:   no oropharyngeal lesions, no LAD, neck supple  Cardio:    regular S1,S2, no murmur  Respiratory:   clear b/l, no wheezing  abd:   soft, BS +, not tender, no hepatosplenomegaly  :     no CVAT, no suprapubic tenderness, no edmondson  Musculoskeletal : R hip and femur edema, warmth, erythema with an area of fluctuation above the incision  Skin:    no rash  vascular: no phlebitis, normal pulses  Neurologic:     no focal deficits  psych: normal affect      Drug Dosing Weight  Height (cm): 157.48 (21 Apr 2019 14:56)  Weight (kg): 59 (21 Apr 2019 14:56)  BMI (kg/m2): 23.8 (21 Apr 2019 14:56)  BSA (m2): 1.59 (21 Apr 2019 14:56)    Vital Signs Last 24 Hrs  T(F): 98.2 (04-22-19 @ 14:48), Max: 98.8 (04-21-19 @ 18:31)    Vital Signs Last 24 Hrs  HR: 78 (04-22-19 @ 14:48) (66 - 95)  BP: 177/70 (04-22-19 @ 14:48) (157/65 - 196/78)  RR: 18 (04-22-19 @ 14:48)  SpO2: 95% (04-22-19 @ 14:48) (94% - 99%)  Wt(kg): --                          10.9   9.7   )-----------( 346      ( 22 Apr 2019 05:30 )             32.8       04-22    140  |  107  |  23  ----------------------------<  85  3.4<L>   |  19<L>  |  0.67    Ca    8.9      22 Apr 2019 05:30    TPro  7.8  /  Alb  3.0<L>  /  TBili  0.3  /  DBili  x   /  AST  25  /  ALT  16  /  AlkPhos  108  04-21          MICROBIOLOGY:  v              RADIOLOGY:    Images below reviewed personally  < from: CT Lower Extremity w/ IV Cont, Right (04.21.19 @ 21:56) >  Redemonstrated post surgical changes status post plate and screw fixation   of fractures in the mid to proximal femoral shaft, with some persistent   lucency at these chronic fracture sites. Linear lucency in the   heterotopic ossification and medial cortex of the proximal femoral shaft,   likely representing acute or subacute fracture. Correlate clinically.   Loosening of the proximal right foraminal component redemonstrated.   Diffuse subcutaneous edema. Correlate clinically for possible cellulitis.   No discrete drainable fluid collections are seen, evaluation limited by   extensive metallic artifact. Small knee joint effusion.

## 2019-04-22 NOTE — ED ADULT NURSE REASSESSMENT NOTE - NS ED NURSE REASSESS COMMENT FT1
Pt sleeping comfortably in stretcher at this time. Respirations spontaneous, non-labored. NAD. Pt pending surgery bed upstairs.

## 2019-04-22 NOTE — PATIENT PROFILE ADULT - HAS THE PATIENT BEEN ADMITTED FROM SHORT TERM REHAB?
HISTORY OF PRESENT ILLNESS  Brianna Crawford is a 82 [facility] [address] no

## 2019-04-22 NOTE — H&P ADULT - HISTORY OF PRESENT ILLNESS
HPI: 73y Female w/ hx of seizure d/o, EtOH abuse, active 1 ppd smoker, CAD s/p stents (remote), HTN, HLD, R FN fx s/p CRPP (2017, [facility]) c/b AVN s/p R USAMA (Dr. Jaime, 5/18) c/b vanc B2 PP Fx s/p ORIF/rev USAMA (Dr. Be 6/18), c/b recurrent PP fx s/p ORIF (Dr. Cornell 7/18), c/b MRSA infection s/p I&D (Dr. Be, 8/18) s/p extended course of IV abx via PICC and further suppressive PO abx. Patient finished course of PO abx late in 2018. She was seen in the office by Dr. Be in 2/19 and had hip pain at that time, so she says she was started on another course of PO doxy, which she finished mid-March, and she has noticed worsening of her pain since then. Today she presents c/o worsening R leg pain to the point that she can no longer ambulate. Denies trauma/HS/LOC. Denies numbness/tingling. Subjective fever and chills over past month per patient, but she is afebrile in the ED. Denies pain/injury elsewhere.     PAST MEDICAL & SURGICAL HISTORY:  Pain of right hip joint  Migraine  Alcohol abuse  Seizure  Stented coronary artery  Coronary artery disease  Anxiety  HTN (hypertension)  Emphysema, unspecified  Anxiety  Migraine  CAD (coronary artery disease)  Hyperlipidemia  Hypertension  Status post total hip replacement, right: 5/21/18  S/P bladder repair    MEDICATIONS  (STANDING):  acetaminophen   Tablet .. 975 milliGRAM(s) Oral every 8 hours  amLODIPine   Tablet 10 milliGRAM(s) Oral daily  aspirin enteric coated 81 milliGRAM(s) Oral daily  cloNIDine 0.1 milliGRAM(s) Oral daily  docusate sodium 100 milliGRAM(s) Oral three times a day  pantoprazole    Tablet 40 milliGRAM(s) Oral before breakfast  senna 2 Tablet(s) Oral at bedtime  sodium chloride 0.9%. 1000 milliLiter(s) IV Continuous <Continuous>    Allergies  No Known Allergies                      11.8   13.2  )-----------( 405      ( 21 Apr 2019 16:10 )             36.2     21 Apr 2019 16:10    135    |  103    |  44     ----------------------------<  94     3.9     |  17     |  1.11     Ca    9.1        21 Apr 2019 16:10    TPro  7.8    /  Alb  3.0    /  TBili  0.3    /  DBili  x      /  AST  25     /  ALT  16     /  AlkPhos  108    21 Apr 2019 16:10    PT/INR - ( 21 Apr 2019 21:29 )   PT: 13.4 sec;   INR: 1.17 ratio      PTT - ( 21 Apr 2019 21:29 )  PTT:26.2 sec    Vital Signs Last 24 Hrs  T(C): 37.1 (04-21-19 @ 23:37), Max: 37.1 (04-21-19 @ 16:30)  T(F): 98.7 (04-21-19 @ 23:37), Max: 98.8 (04-21-19 @ 18:31)  HR: 95 (04-21-19 @ 23:37) (60 - 95)  BP: 166/74 (04-21-19 @ 23:37) (121/68 - 183/82)  BP(mean): 94 (04-21-19 @ 18:31) (94 - 94)  RR: 16 (04-21-19 @ 23:37) (16 - 18)  SpO2: 95% (04-21-19 @ 23:37) (95% - 99%)    Imaging: XR and CT of the R hip/femur were personally reviewed and demonstrates intact hardware with fracture lines through medial heterotopic ossification, possibly indicative of non-union    Physical Exam  Gen: NAD  RLE: skin intact with healed incision, palpable fluctuance at proximal end of incision with induration of thigh along entire length of incision, unable to SLR, hip ROM okay with 0-50 flexion and only some pain, knee ROM full without severe pain; SILT L2-S1; Fires TA/EHL/GS distally; DP pulse palpable; patient with some calf pain with palpation but not with passive movement of troy  Secondary survey: benign, nv intact, able to SLR contralateral leg, bilateral upper extremity skin intact with no gross deformity, non tender to palpation over bony prominences, neurovascularly intact    A/P: 73y Female with chronic infection of R hip/femur hardware, 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 Be  Medical clearance/optimization for OR  Medical team recommending cardiac clearance and TTE prior to OR, both are pending  Given that patient is not systemically ill at this time, we will hold Abx prior to OR  Consent in chart for removal of hardware, I&D, possible [place] procedure, possible antibiotic spacer placement  NPO/IVF for possible OR tomorrow  RLE duplex ultrasound ordered to r/o DVT    Patient discussed with Dr. Be and Dr. Kraig Lakhani MD

## 2019-04-22 NOTE — CONSULT NOTE ADULT - ASSESSMENT
HTN  cont current meds  add labetalol     CAD history of stent  cont asa  add statin in future     AS  moderate on last echo   repeat echo     Septic joint   recurrent  check blood culture  agree to TRUDY rule out IE     PreOP  Based on current ACC/AHA guidelines, patient history and physical exam, the patient is considered to have elevated risk  recommend echo/TRUDY to eval valve and rule out IE

## 2019-04-22 NOTE — ED ADULT NURSE REASSESSMENT NOTE - NS ED NURSE REASSESS COMMENT FT1
01:35. Pt sleeping comfortably at this time. Respirations spontaneous, non-labored. NAD. Pt pending bed upstairs Safety maintained. Will continue to reassess, 01:35. Pt sleeping comfortably at this time. Respirations spontaneous, non-labored. NAD. Pt pending bed upstairs Safety maintained. Will continue to reassess.

## 2019-04-22 NOTE — PROGRESS NOTE ADULT - ASSESSMENT
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  Hypertension likely exacerbated 2/2 requiring home HTN meds this am, patient is also in pain, and upset about NPO status.  Patient is mildly hypokalemic, likely 2/2 nutritional causes.  NAD.

## 2019-04-22 NOTE — CHART NOTE - NSCHARTNOTEFT_GEN_A_CORE
Spoke with Cardiology attending Dr. Cabrera, patient cleared for OR  No medical contraindications for OR per Dr. Rick Cabrera to write official note Spoke with Cardiology attending Dr. Cabrera, TTE/TRUDY results reviewed  No cardiac contraindications for OR per Dr. Cabrera

## 2019-04-22 NOTE — PROGRESS NOTE ADULT - SUBJECTIVE AND OBJECTIVE BOX
Patient resting, upset, want to drink water, team explained she is NPO for surgery add on schedule.  Also complaining of general/non-specific pain.  No chest pain, SOB, N/V.    T(C): 36.8 (04-22-19 @ 05:15), Max: 37.1 (04-21-19 @ 16:30)  HR: 92 (04-22-19 @ 05:15) (60 - 95)  BP: 196/78 (04-22-19 @ 05:15) (121/68 - 196/78)  RR: 18 (04-22-19 @ 05:15) (16 - 18)  SpO2: 96% (04-22-19 @ 05:15) (94% - 99%)  Wt(kg): --    Exam:  Sleeping but arousable, No Acute Distress  R hip appreciated a 5cm in diameter fluctuant mass on proximal incision line, in line with scar, compartments are mildly edematous but compressible  Calves Soft, Non-tender bilaterally  +PF/DF/EHL/FHL  SILT  +DP Pulse                        10.9   9.7   )-----------( 346      ( 22 Apr 2019 05:30 )             32.8    04-22    140  |  107  |  23  ----------------------------<  85  3.4<L>   |  19<L>  |  0.67    Ca    8.9      22 Apr 2019 05:30    TPro  7.8  /  Alb  3.0<L>  /  TBili  0.3  /  DBili  x   /  AST  25  /  ALT  16  /  AlkPhos  108  04-21

## 2019-04-22 NOTE — CONSULT NOTE ADULT - SUBJECTIVE AND OBJECTIVE BOX
CHIEF COMPLAINT:Patient is a 73y old  Female who presents with a chief complaint of R hip/femur infected hardware (22 Apr 2019 06:24)      HISTORY OF PRESENT ILLNESS:    73 female with history as below seizure disorder (not on medications), EtOH abuse, CAD w/ stents (last stent 10 years ago), Aortic Stenosis, R THR (2/2 to right hip fracture) course c/b periprostetic fracture in July 2018 with ORIF now here with persistent pain and infection of right hip now to go for operative repair/debridement/antiobiotic spacer. pt i snow a bit somnolent from pain meds but denies any chest pain or sob   cardiology is called for pre op eval     PAST MEDICAL & SURGICAL HISTORY:  Pain of right hip joint  Migraine  Alcohol abuse  Seizure  Stented coronary artery  Coronary artery disease  Anxiety  HTN (hypertension)  Emphysema, unspecified  Anxiety  Migraine  CAD (coronary artery disease)  Hyperlipidemia  Hypertension  Status post total hip replacement, right: 5/21/18  S/P bladder repair          MEDICATIONS:  amLODIPine   Tablet 10 milliGRAM(s) Oral daily  aspirin enteric coated 81 milliGRAM(s) Oral daily  cloNIDine 0.1 milliGRAM(s) Oral daily  lisinopril 10 milliGRAM(s) Oral daily      ALBUTerol/ipratropium for Nebulization 3 milliLiter(s) Nebulizer every 6 hours PRN    acetaminophen   Tablet .. 975 milliGRAM(s) Oral every 8 hours  oxyCODONE    IR 5 milliGRAM(s) Oral every 4 hours PRN  oxyCODONE    IR 10 milliGRAM(s) Oral every 4 hours PRN    docusate sodium 100 milliGRAM(s) Oral three times a day  pantoprazole    Tablet 40 milliGRAM(s) Oral before breakfast  senna 2 Tablet(s) Oral at bedtime      potassium chloride    Tablet ER 20 milliEquivalent(s) Oral every 2 hours  sodium chloride 0.9%. 1000 milliLiter(s) IV Continuous <Continuous>      FAMILY HISTORY:  Family history of coronary artery disease in daughter (Child)      Non-contributory    SOCIAL HISTORY:    Etoh    Allergies    No Known Allergies    Intolerances    	    REVIEW OF SYSTEMS:  as above  The rest of the 14 points ROS reviewed and except above they are unremarkable.        PHYSICAL EXAM:  T(C): 36.8 (04-22-19 @ 05:15), Max: 37.1 (04-21-19 @ 16:30)  HR: 92 (04-22-19 @ 05:15) (60 - 95)  BP: 196/78 (04-22-19 @ 05:15) (121/68 - 196/78)  RR: 18 (04-22-19 @ 05:15) (16 - 18)  SpO2: 96% (04-22-19 @ 05:15) (94% - 99%)  Wt(kg): --  I&O's Summary    21 Apr 2019 07:01  -  22 Apr 2019 07:00  --------------------------------------------------------  IN: 0 mL / OUT: 200 mL / NET: -200 mL      JVP: Normal  Neck: supple  Lung: clear   CV: S1 S2 , Murmur: pos gardenia   Abd: soft  Ext: No edema  neuro: Awake   Psych: flat affect  Skin: normal      LABS/DATA:    TELEMETRY: 	    ECG:  	   	  CARDIAC MARKERS:                        47 <<== 04-22-19 @ 05:31                              10.9   9.7   )-----------( 346      ( 22 Apr 2019 05:30 )             32.8     04-22    140  |  107  |  23  ----------------------------<  85  3.4<L>   |  19<L>  |  0.67    Ca    8.9      22 Apr 2019 05:30    TPro  7.8  /  Alb  3.0<L>  /  TBili  0.3  /  DBili  x   /  AST  25  /  ALT  16  /  AlkPhos  108  04-21    proBNP:   Lipid Profile:   HgA1c:   TSH:

## 2019-04-23 LAB
ANION GAP SERPL CALC-SCNC: 11 MMOL/L — SIGNIFICANT CHANGE UP (ref 5–17)
ANION GAP SERPL CALC-SCNC: 9 MMOL/L — SIGNIFICANT CHANGE UP (ref 5–17)
APTT BLD: 29.6 SEC — SIGNIFICANT CHANGE UP (ref 27.5–36.3)
BLD GP AB SCN SERPL QL: NEGATIVE — SIGNIFICANT CHANGE UP
BUN SERPL-MCNC: 10 MG/DL — SIGNIFICANT CHANGE UP (ref 7–23)
BUN SERPL-MCNC: 15 MG/DL — SIGNIFICANT CHANGE UP (ref 7–23)
CALCIUM SERPL-MCNC: 8.3 MG/DL — LOW (ref 8.4–10.5)
CALCIUM SERPL-MCNC: 8.6 MG/DL — SIGNIFICANT CHANGE UP (ref 8.4–10.5)
CHLORIDE SERPL-SCNC: 106 MMOL/L — SIGNIFICANT CHANGE UP (ref 96–108)
CHLORIDE SERPL-SCNC: 108 MMOL/L — SIGNIFICANT CHANGE UP (ref 96–108)
CO2 SERPL-SCNC: 23 MMOL/L — SIGNIFICANT CHANGE UP (ref 22–31)
CO2 SERPL-SCNC: 23 MMOL/L — SIGNIFICANT CHANGE UP (ref 22–31)
CREAT SERPL-MCNC: 0.56 MG/DL — SIGNIFICANT CHANGE UP (ref 0.5–1.3)
CREAT SERPL-MCNC: 0.77 MG/DL — SIGNIFICANT CHANGE UP (ref 0.5–1.3)
GAS PNL BLDA: SIGNIFICANT CHANGE UP
GAS PNL BLDA: SIGNIFICANT CHANGE UP
GLUCOSE SERPL-MCNC: 119 MG/DL — HIGH (ref 70–99)
GLUCOSE SERPL-MCNC: 161 MG/DL — HIGH (ref 70–99)
HCT VFR BLD CALC: 31 % — LOW (ref 34.5–45)
HCT VFR BLD CALC: 31.2 % — LOW (ref 34.5–45)
HCV AB S/CO SERPL IA: 0.11 S/CO — SIGNIFICANT CHANGE UP (ref 0–0.99)
HCV AB SERPL-IMP: SIGNIFICANT CHANGE UP
HGB BLD-MCNC: 10.4 G/DL — LOW (ref 11.5–15.5)
HGB BLD-MCNC: 10.6 G/DL — LOW (ref 11.5–15.5)
INR BLD: 1.12 RATIO — SIGNIFICANT CHANGE UP (ref 0.88–1.16)
MCHC RBC-ENTMCNC: 30.2 PG — SIGNIFICANT CHANGE UP (ref 27–34)
MCHC RBC-ENTMCNC: 30.3 PG — SIGNIFICANT CHANGE UP (ref 27–34)
MCHC RBC-ENTMCNC: 33.4 GM/DL — SIGNIFICANT CHANGE UP (ref 32–36)
MCHC RBC-ENTMCNC: 34.4 GM/DL — SIGNIFICANT CHANGE UP (ref 32–36)
MCV RBC AUTO: 87.8 FL — SIGNIFICANT CHANGE UP (ref 80–100)
MCV RBC AUTO: 90.9 FL — SIGNIFICANT CHANGE UP (ref 80–100)
PLATELET # BLD AUTO: 297 K/UL — SIGNIFICANT CHANGE UP (ref 150–400)
PLATELET # BLD AUTO: 319 K/UL — SIGNIFICANT CHANGE UP (ref 150–400)
POTASSIUM SERPL-MCNC: 3.7 MMOL/L — SIGNIFICANT CHANGE UP (ref 3.5–5.3)
POTASSIUM SERPL-MCNC: 4.3 MMOL/L — SIGNIFICANT CHANGE UP (ref 3.5–5.3)
POTASSIUM SERPL-SCNC: 3.7 MMOL/L — SIGNIFICANT CHANGE UP (ref 3.5–5.3)
POTASSIUM SERPL-SCNC: 4.3 MMOL/L — SIGNIFICANT CHANGE UP (ref 3.5–5.3)
PROTHROM AB SERPL-ACNC: 12.9 SEC — SIGNIFICANT CHANGE UP (ref 10–12.9)
RBC # BLD: 3.43 M/UL — LOW (ref 3.8–5.2)
RBC # BLD: 3.53 M/UL — LOW (ref 3.8–5.2)
RBC # FLD: 15.2 % — HIGH (ref 10.3–14.5)
RBC # FLD: 15.6 % — HIGH (ref 10.3–14.5)
RH IG SCN BLD-IMP: POSITIVE — SIGNIFICANT CHANGE UP
SODIUM SERPL-SCNC: 138 MMOL/L — SIGNIFICANT CHANGE UP (ref 135–145)
SODIUM SERPL-SCNC: 142 MMOL/L — SIGNIFICANT CHANGE UP (ref 135–145)
WBC # BLD: 10.2 K/UL — SIGNIFICANT CHANGE UP (ref 3.8–10.5)
WBC # BLD: 8.4 K/UL — SIGNIFICANT CHANGE UP (ref 3.8–10.5)
WBC # FLD AUTO: 10.2 K/UL — SIGNIFICANT CHANGE UP (ref 3.8–10.5)
WBC # FLD AUTO: 8.4 K/UL — SIGNIFICANT CHANGE UP (ref 3.8–10.5)

## 2019-04-23 PROCEDURE — 99223 1ST HOSP IP/OBS HIGH 75: CPT | Mod: AI,57

## 2019-04-23 PROCEDURE — 93971 EXTREMITY STUDY: CPT | Mod: 26

## 2019-04-23 PROCEDURE — 20680 REMOVAL OF IMPLANT DEEP: CPT | Mod: RT

## 2019-04-23 PROCEDURE — 73501 X-RAY EXAM HIP UNI 1 VIEW: CPT | Mod: 26,RT

## 2019-04-23 PROCEDURE — 99233 SBSQ HOSP IP/OBS HIGH 50: CPT

## 2019-04-23 PROCEDURE — 27091 REMOVAL OF HIP PROSTHESIS: CPT | Mod: RT

## 2019-04-23 PROCEDURE — 72170 X-RAY EXAM OF PELVIS: CPT | Mod: 26

## 2019-04-23 RX ORDER — ONDANSETRON 8 MG/1
4 TABLET, FILM COATED ORAL EVERY 6 HOURS
Qty: 0 | Refills: 0 | Status: DISCONTINUED | OUTPATIENT
Start: 2019-04-23 | End: 2019-05-02

## 2019-04-23 RX ORDER — HYDROMORPHONE HYDROCHLORIDE 2 MG/ML
6 INJECTION INTRAMUSCULAR; INTRAVENOUS; SUBCUTANEOUS EVERY 4 HOURS
Qty: 0 | Refills: 0 | Status: DISCONTINUED | OUTPATIENT
Start: 2019-04-23 | End: 2019-04-30

## 2019-04-23 RX ORDER — QUETIAPINE FUMARATE 200 MG/1
25 TABLET, FILM COATED ORAL ONCE
Qty: 0 | Refills: 0 | Status: COMPLETED | OUTPATIENT
Start: 2019-04-23 | End: 2019-04-23

## 2019-04-23 RX ORDER — ONDANSETRON 8 MG/1
4 TABLET, FILM COATED ORAL ONCE
Qty: 0 | Refills: 0 | Status: DISCONTINUED | OUTPATIENT
Start: 2019-04-23 | End: 2019-04-23

## 2019-04-23 RX ORDER — SODIUM CHLORIDE 9 MG/ML
500 INJECTION INTRAMUSCULAR; INTRAVENOUS; SUBCUTANEOUS ONCE
Qty: 0 | Refills: 0 | Status: COMPLETED | OUTPATIENT
Start: 2019-04-24 | End: 2019-04-24

## 2019-04-23 RX ORDER — VANCOMYCIN HCL 1 G
1000 VIAL (EA) INTRAVENOUS ONCE
Qty: 0 | Refills: 0 | Status: COMPLETED | OUTPATIENT
Start: 2019-04-23 | End: 2019-04-23

## 2019-04-23 RX ORDER — FERROUS SULFATE 325(65) MG
325 TABLET ORAL DAILY
Qty: 0 | Refills: 0 | Status: DISCONTINUED | OUTPATIENT
Start: 2019-04-23 | End: 2019-05-02

## 2019-04-23 RX ORDER — FOLIC ACID 0.8 MG
1 TABLET ORAL DAILY
Qty: 0 | Refills: 0 | Status: DISCONTINUED | OUTPATIENT
Start: 2019-04-23 | End: 2019-05-02

## 2019-04-23 RX ORDER — POLYETHYLENE GLYCOL 3350 17 G/17G
17 POWDER, FOR SOLUTION ORAL DAILY
Qty: 0 | Refills: 0 | Status: DISCONTINUED | OUTPATIENT
Start: 2019-04-23 | End: 2019-05-02

## 2019-04-23 RX ORDER — ACETAMINOPHEN 500 MG
1000 TABLET ORAL ONCE
Qty: 0 | Refills: 0 | Status: DISCONTINUED | OUTPATIENT
Start: 2019-04-23 | End: 2019-04-23

## 2019-04-23 RX ORDER — MORPHINE SULFATE 50 MG/1
15 CAPSULE, EXTENDED RELEASE ORAL EVERY 8 HOURS
Qty: 0 | Refills: 0 | Status: DISCONTINUED | OUTPATIENT
Start: 2019-04-23 | End: 2019-04-30

## 2019-04-23 RX ORDER — VANCOMYCIN HCL 1 G
VIAL (EA) INTRAVENOUS
Qty: 0 | Refills: 0 | Status: DISCONTINUED | OUTPATIENT
Start: 2019-04-23 | End: 2019-04-23

## 2019-04-23 RX ORDER — ACETAMINOPHEN 500 MG
1000 TABLET ORAL ONCE
Qty: 0 | Refills: 0 | Status: COMPLETED | OUTPATIENT
Start: 2019-04-24 | End: 2019-04-24

## 2019-04-23 RX ORDER — ACETAMINOPHEN 500 MG
975 TABLET ORAL EVERY 8 HOURS
Qty: 0 | Refills: 0 | Status: DISCONTINUED | OUTPATIENT
Start: 2019-04-25 | End: 2019-05-02

## 2019-04-23 RX ORDER — LISINOPRIL 2.5 MG/1
10 TABLET ORAL DAILY
Qty: 0 | Refills: 0 | Status: DISCONTINUED | OUTPATIENT
Start: 2019-04-23 | End: 2019-05-02

## 2019-04-23 RX ORDER — VANCOMYCIN HCL 1 G
1000 VIAL (EA) INTRAVENOUS EVERY 12 HOURS
Qty: 0 | Refills: 0 | Status: DISCONTINUED | OUTPATIENT
Start: 2019-04-23 | End: 2019-04-23

## 2019-04-23 RX ORDER — KETOROLAC TROMETHAMINE 30 MG/ML
30 SYRINGE (ML) INJECTION EVERY 6 HOURS
Qty: 0 | Refills: 0 | Status: DISCONTINUED | OUTPATIENT
Start: 2019-04-23 | End: 2019-04-25

## 2019-04-23 RX ORDER — HYDROMORPHONE HYDROCHLORIDE 2 MG/ML
1 INJECTION INTRAMUSCULAR; INTRAVENOUS; SUBCUTANEOUS
Qty: 0 | Refills: 0 | Status: DISCONTINUED | OUTPATIENT
Start: 2019-04-23 | End: 2019-04-30

## 2019-04-23 RX ORDER — AMLODIPINE BESYLATE 2.5 MG/1
10 TABLET ORAL DAILY
Qty: 0 | Refills: 0 | Status: DISCONTINUED | OUTPATIENT
Start: 2019-04-23 | End: 2019-05-02

## 2019-04-23 RX ORDER — LANOLIN ALCOHOL/MO/W.PET/CERES
3 CREAM (GRAM) TOPICAL AT BEDTIME
Qty: 0 | Refills: 0 | Status: DISCONTINUED | OUTPATIENT
Start: 2019-04-23 | End: 2019-04-23

## 2019-04-23 RX ORDER — DOCUSATE SODIUM 100 MG
100 CAPSULE ORAL THREE TIMES A DAY
Qty: 0 | Refills: 0 | Status: DISCONTINUED | OUTPATIENT
Start: 2019-04-23 | End: 2019-05-02

## 2019-04-23 RX ORDER — SODIUM CHLORIDE 9 MG/ML
500 INJECTION INTRAMUSCULAR; INTRAVENOUS; SUBCUTANEOUS ONCE
Qty: 0 | Refills: 0 | Status: COMPLETED | OUTPATIENT
Start: 2019-04-23 | End: 2019-04-23

## 2019-04-23 RX ORDER — ACETAMINOPHEN 500 MG
1000 TABLET ORAL ONCE
Qty: 0 | Refills: 0 | Status: COMPLETED | OUTPATIENT
Start: 2019-04-23 | End: 2019-04-23

## 2019-04-23 RX ORDER — VANCOMYCIN HCL 1 G
1000 VIAL (EA) INTRAVENOUS EVERY 12 HOURS
Qty: 0 | Refills: 0 | Status: DISCONTINUED | OUTPATIENT
Start: 2019-04-23 | End: 2019-04-25

## 2019-04-23 RX ORDER — ASPIRIN/CALCIUM CARB/MAGNESIUM 324 MG
325 TABLET ORAL
Qty: 0 | Refills: 0 | Status: DISCONTINUED | OUTPATIENT
Start: 2019-04-23 | End: 2019-05-02

## 2019-04-23 RX ORDER — SODIUM CHLORIDE 9 MG/ML
1000 INJECTION, SOLUTION INTRAVENOUS
Qty: 0 | Refills: 0 | Status: DISCONTINUED | OUTPATIENT
Start: 2019-04-23 | End: 2019-05-02

## 2019-04-23 RX ORDER — IPRATROPIUM/ALBUTEROL SULFATE 18-103MCG
3 AEROSOL WITH ADAPTER (GRAM) INHALATION EVERY 6 HOURS
Qty: 0 | Refills: 0 | Status: DISCONTINUED | OUTPATIENT
Start: 2019-04-23 | End: 2019-05-02

## 2019-04-23 RX ORDER — TRAMADOL HYDROCHLORIDE 50 MG/1
50 TABLET ORAL EVERY 8 HOURS
Qty: 0 | Refills: 0 | Status: DISCONTINUED | OUTPATIENT
Start: 2019-04-23 | End: 2019-04-30

## 2019-04-23 RX ORDER — HALOPERIDOL DECANOATE 100 MG/ML
5 INJECTION INTRAMUSCULAR ONCE
Qty: 0 | Refills: 0 | Status: COMPLETED | OUTPATIENT
Start: 2019-04-23 | End: 2019-04-23

## 2019-04-23 RX ORDER — HYDROMORPHONE HYDROCHLORIDE 2 MG/ML
0.5 INJECTION INTRAMUSCULAR; INTRAVENOUS; SUBCUTANEOUS
Qty: 0 | Refills: 0 | Status: DISCONTINUED | OUTPATIENT
Start: 2019-04-23 | End: 2019-04-23

## 2019-04-23 RX ORDER — LABETALOL HCL 100 MG
100 TABLET ORAL THREE TIMES A DAY
Qty: 0 | Refills: 0 | Status: DISCONTINUED | OUTPATIENT
Start: 2019-04-23 | End: 2019-05-02

## 2019-04-23 RX ORDER — HALOPERIDOL DECANOATE 100 MG/ML
5 INJECTION INTRAMUSCULAR ONCE
Qty: 0 | Refills: 0 | Status: DISCONTINUED | OUTPATIENT
Start: 2019-04-23 | End: 2019-04-23

## 2019-04-23 RX ORDER — HYDROMORPHONE HYDROCHLORIDE 2 MG/ML
4 INJECTION INTRAMUSCULAR; INTRAVENOUS; SUBCUTANEOUS EVERY 4 HOURS
Qty: 0 | Refills: 0 | Status: DISCONTINUED | OUTPATIENT
Start: 2019-04-23 | End: 2019-04-30

## 2019-04-23 RX ORDER — DIAZEPAM 5 MG
5 TABLET ORAL EVERY 8 HOURS
Qty: 0 | Refills: 0 | Status: DISCONTINUED | OUTPATIENT
Start: 2019-04-23 | End: 2019-04-30

## 2019-04-23 RX ORDER — MAGNESIUM HYDROXIDE 400 MG/1
30 TABLET, CHEWABLE ORAL DAILY
Qty: 0 | Refills: 0 | Status: DISCONTINUED | OUTPATIENT
Start: 2019-04-23 | End: 2019-05-02

## 2019-04-23 RX ORDER — ACETAMINOPHEN 500 MG
650 TABLET ORAL EVERY 6 HOURS
Qty: 0 | Refills: 0 | Status: DISCONTINUED | OUTPATIENT
Start: 2019-04-23 | End: 2019-04-23

## 2019-04-23 RX ORDER — LANOLIN ALCOHOL/MO/W.PET/CERES
3 CREAM (GRAM) TOPICAL AT BEDTIME
Qty: 0 | Refills: 0 | Status: DISCONTINUED | OUTPATIENT
Start: 2019-04-23 | End: 2019-05-02

## 2019-04-23 RX ORDER — SENNA PLUS 8.6 MG/1
2 TABLET ORAL AT BEDTIME
Qty: 0 | Refills: 0 | Status: DISCONTINUED | OUTPATIENT
Start: 2019-04-23 | End: 2019-05-02

## 2019-04-23 RX ORDER — PANTOPRAZOLE SODIUM 20 MG/1
40 TABLET, DELAYED RELEASE ORAL
Qty: 0 | Refills: 0 | Status: DISCONTINUED | OUTPATIENT
Start: 2019-04-23 | End: 2019-05-02

## 2019-04-23 RX ADMIN — AMLODIPINE BESYLATE 10 MILLIGRAM(S): 2.5 TABLET ORAL at 06:19

## 2019-04-23 RX ADMIN — HYDROMORPHONE HYDROCHLORIDE 6 MILLIGRAM(S): 2 INJECTION INTRAMUSCULAR; INTRAVENOUS; SUBCUTANEOUS at 23:04

## 2019-04-23 RX ADMIN — Medication 100 MILLIGRAM(S): at 22:34

## 2019-04-23 RX ADMIN — SODIUM CHLORIDE 500 MILLILITER(S): 9 INJECTION INTRAMUSCULAR; INTRAVENOUS; SUBCUTANEOUS at 19:15

## 2019-04-23 RX ADMIN — Medication 250 MILLIGRAM(S): at 12:00

## 2019-04-23 RX ADMIN — SODIUM CHLORIDE 75 MILLILITER(S): 9 INJECTION INTRAMUSCULAR; INTRAVENOUS; SUBCUTANEOUS at 19:14

## 2019-04-23 RX ADMIN — SODIUM CHLORIDE 75 MILLILITER(S): 9 INJECTION, SOLUTION INTRAVENOUS at 20:14

## 2019-04-23 RX ADMIN — OXYCODONE HYDROCHLORIDE 10 MILLIGRAM(S): 5 TABLET ORAL at 01:20

## 2019-04-23 RX ADMIN — Medication 975 MILLIGRAM(S): at 09:34

## 2019-04-23 RX ADMIN — OXYCODONE HYDROCHLORIDE 10 MILLIGRAM(S): 5 TABLET ORAL at 10:13

## 2019-04-23 RX ADMIN — Medication 100 MILLIGRAM(S): at 06:19

## 2019-04-23 RX ADMIN — Medication 1000 MILLIGRAM(S): at 23:04

## 2019-04-23 RX ADMIN — OXYCODONE HYDROCHLORIDE 10 MILLIGRAM(S): 5 TABLET ORAL at 10:45

## 2019-04-23 RX ADMIN — Medication 0.1 MILLIGRAM(S): at 07:14

## 2019-04-23 RX ADMIN — Medication 3 MILLIGRAM(S): at 22:34

## 2019-04-23 RX ADMIN — OXYCODONE HYDROCHLORIDE 10 MILLIGRAM(S): 5 TABLET ORAL at 00:39

## 2019-04-23 RX ADMIN — CHLORHEXIDINE GLUCONATE 1 APPLICATION(S): 213 SOLUTION TOPICAL at 10:14

## 2019-04-23 RX ADMIN — Medication 975 MILLIGRAM(S): at 10:10

## 2019-04-23 RX ADMIN — Medication 1 TABLET(S): at 22:32

## 2019-04-23 RX ADMIN — MORPHINE SULFATE 15 MILLIGRAM(S): 50 CAPSULE, EXTENDED RELEASE ORAL at 22:32

## 2019-04-23 RX ADMIN — SODIUM CHLORIDE 500 MILLILITER(S): 9 INJECTION INTRAMUSCULAR; INTRAVENOUS; SUBCUTANEOUS at 22:35

## 2019-04-23 RX ADMIN — MORPHINE SULFATE 15 MILLIGRAM(S): 50 CAPSULE, EXTENDED RELEASE ORAL at 07:10

## 2019-04-23 RX ADMIN — LISINOPRIL 10 MILLIGRAM(S): 2.5 TABLET ORAL at 06:19

## 2019-04-23 RX ADMIN — SODIUM CHLORIDE 75 MILLILITER(S): 9 INJECTION, SOLUTION INTRAVENOUS at 00:15

## 2019-04-23 RX ADMIN — QUETIAPINE FUMARATE 25 MILLIGRAM(S): 200 TABLET, FILM COATED ORAL at 22:34

## 2019-04-23 RX ADMIN — MORPHINE SULFATE 15 MILLIGRAM(S): 50 CAPSULE, EXTENDED RELEASE ORAL at 06:20

## 2019-04-23 RX ADMIN — HYDROMORPHONE HYDROCHLORIDE 6 MILLIGRAM(S): 2 INJECTION INTRAMUSCULAR; INTRAVENOUS; SUBCUTANEOUS at 22:31

## 2019-04-23 RX ADMIN — HALOPERIDOL DECANOATE 5 MILLIGRAM(S): 100 INJECTION INTRAMUSCULAR at 02:05

## 2019-04-23 RX ADMIN — MORPHINE SULFATE 15 MILLIGRAM(S): 50 CAPSULE, EXTENDED RELEASE ORAL at 23:04

## 2019-04-23 RX ADMIN — Medication 400 MILLIGRAM(S): at 22:35

## 2019-04-23 NOTE — PROGRESS NOTE ADULT - SUBJECTIVE AND OBJECTIVE BOX
Follow Up:  hardware infection with prosthetic joint infection, cellulitis    Interval History: pt stable and afebrile, going to OR today    ROS:      All other systems negative    Constitutional: subjective fever, + chills  Eye: no eye pain, no redness, no vision changes  ENT:  no sore throat, no rhinorrhea  Cardiovascular:  no chest pain, no palpitation  Respiratory:  no SOB, no cough  GI:  no abd pain, no vomiting, no diarrhea  urinary: no dysuria, no hematuria, no flank pain  : no  discharge or bleeding  musculoskeletal:  R hip pain, swelling and difficulty walking   skin:  no rash  neurology:  no headache, no seizure, no change in mental status  psych: no anxiety, no depression         Allergies  No Known Allergies        ANTIMICROBIALS:  vancomycin  IVPB        OTHER MEDS:  acetaminophen   Tablet .. 975 milliGRAM(s) Oral every 8 hours  ALBUTerol/ipratropium for Nebulization 3 milliLiter(s) Nebulizer every 6 hours PRN  amLODIPine   Tablet 10 milliGRAM(s) Oral daily  aspirin enteric coated 81 milliGRAM(s) Oral daily  cloNIDine 0.1 milliGRAM(s) Oral two times a day  docusate sodium 100 milliGRAM(s) Oral three times a day  labetalol 100 milliGRAM(s) Oral three times a day  lactated ringers. 1000 milliLiter(s) IV Continuous <Continuous>  lisinopril 10 milliGRAM(s) Oral daily  melatonin 3 milliGRAM(s) Oral at bedtime PRN  morphine ER Tablet 15 milliGRAM(s) Oral every 8 hours  oxyCODONE    IR 5 milliGRAM(s) Oral every 3 hours PRN  oxyCODONE    IR 10 milliGRAM(s) Oral every 3 hours PRN  pantoprazole    Tablet 40 milliGRAM(s) Oral before breakfast  senna 2 Tablet(s) Oral at bedtime  sodium chloride 0.9%. 1000 milliLiter(s) IV Continuous <Continuous>      Vital Signs Last 24 Hrs  T(C): 37 (23 Apr 2019 04:22), Max: 37 (23 Apr 2019 00:35)  T(F): 98.6 (23 Apr 2019 04:22), Max: 98.6 (23 Apr 2019 00:35)  HR: 88 (23 Apr 2019 07:36) (66 - 88)  BP: 190/68 (23 Apr 2019 07:36) (148/71 - 193/76)  BP(mean): --  RR: 18 (23 Apr 2019 04:22) (1 - 18)  SpO2: 96% (23 Apr 2019 04:22) (95% - 98%)    Physical exam:   General:    NAD, non toxic  Head: atraumatic, normocephalic  Eyes: normal sclera and conjunctiva  ENT:   no oropharyngeal lesions, no LAD, neck supple  Cardio:    regular S1,S2, no murmur  Respiratory:   clear b/l, no wheezing  abd:   soft, BS +, not tender, no hepatosplenomegaly  :     no CVAT, no suprapubic tenderness, no edmondson  Musculoskeletal : R hip and femur edema, warmth, erythema with an area of fluctuation above the incision  Skin:    no rash  vascular: no phlebitis, normal pulses  Neurologic:     no focal deficits  psych: normal affect                        10.4   8.4   )-----------( 297      ( 23 Apr 2019 04:44 )             31.2       04-23    142  |  108  |  15  ----------------------------<  119<H>  3.7   |  23  |  0.56    Ca    8.6      23 Apr 2019 04:44    TPro  7.8  /  Alb  3.0<L>  /  TBili  0.3  /  DBili  x   /  AST  25  /  ALT  16  /  AlkPhos  108  04-21          MICROBIOLOGY:  v  .Blood  04-21-19   No growth to date.  --  --                RADIOLOGY:  Images below reviewed personally  < from: CT Lower Extremity w/ IV Cont, Right (04.21.19 @ 21:56) >    Redemonstrated post surgical changes status post plate and screw fixation   of fractures in the mid to proximal femoral shaft, with some persistent   lucency at these chronic fracture sites. Linear lucency in the   heterotopic ossification and medial cortex of the proximal femoral shaft,   likely representing acute or subacute fracture. Correlate clinically.   Loosening of the proximal right foraminal component redemonstrated.   Diffuse subcutaneous edema. Correlate clinically for possible cellulitis.   No discrete drainable fluid collections are seen, evaluation limited by   extensive metallic artifact. Small knee joint effusion.          < from: [facility] Duplex Lower Ext Vein Scan, Right (04.23.19 @ 09:15) >  IMPRESSION:     No evidence of right lower extremity deep venous thrombosis.

## 2019-04-23 NOTE — PROGRESS NOTE ADULT - ASSESSMENT
HTN  cont current meds  add labetalol     CAD history of stent  cont asa  add statin in future     AS  stable  Moderate     Septic joint   recurrent  anbx fu with ID plan for revision     PreOP  Based on current ACC/AHA guidelines, patient history and physical exam, the patient is considered to have elevated risk  no CV objection to proceed to OR

## 2019-04-23 NOTE — PROVIDER CONTACT NOTE (OTHER) - ACTION/TREATMENT ORDERED:
Dr. Dillon made aware, no intervention at this time. Pt made aware of NPO status, and informed door will have to remain cracked during bathroom privileges.

## 2019-04-23 NOTE — PROGRESS NOTE ADULT - ASSESSMENT
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  Continues to want to drink water, reinforced NPO status verbally with patient.  Cardiology cleared.     OR today  NPO/IVF  IS  WBAT  DVT PPx held for surgery  Pain Control  FU RLE doppler  FU AM labs

## 2019-04-23 NOTE — PROGRESS NOTE ADULT - ASSESSMENT
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 THR, kika-prosthetic R femur fx (s/p Total Hip revision with ORIF, 6/30/18), recent re-admission (07/16 - 07/24/18) for worsening R hip pain and decreased ability to ambulate now s/p R femur [place] B1 periprosthetic fracture ORIF (07/19/18) admitted 8/2018 with MRSA bacteremia and hardware collection s/p washout but the hardware was not removed, OR cxs also with MRSA she completed a course of vanco and then bactrim for suppressive therapy but again  presented with hip erythema and edema again the hardware was not removed in hope for femur union, and she received another course of vanco and then doxy and symptoms started after she finished the antibiotics and came back with fever, chills, erythema edema and fluctuation on the hip incision  here afebrile WBC normal  CT with lucency, fractures and cellulitis      hardware infection previously MRSA bacteremia and prosthetic joint infection with cellulitis and ?abscess  s/p 2 courses of IV vanco and again recurrence    * will need hardware removal  * f/u the blood cx, negative for now  * start vanco q 12 (was discontinued yesterday by primary team)  * f/u the OR cx  * check the vanco trough before the 4th dose

## 2019-04-23 NOTE — BRIEF OPERATIVE NOTE - OPERATION/FINDINGS
purulence along entire lateral aspect of the R leg  loose hardware, removed in full  antibiotic spacer placed

## 2019-04-23 NOTE — PROGRESS NOTE ADULT - SUBJECTIVE AND OBJECTIVE BOX
Orthopedic Progress Note     S:  Patient seen and examined at bedside this AM, medically cleared for OR today.  Drank water overnight but not going to OR until PM.  Patient denies any chest pain, SOB, N/V, fevers/chills.    T(C): 37 (04-23-19 @ 04:22), Max: 37 (04-23-19 @ 00:35)  HR: 87 (04-23-19 @ 04:22) (66 - 87)  BP: 193/76 (04-23-19 @ 04:22) (148/71 - 193/76)  RR: 18 (04-23-19 @ 04:22) (1 - 18)  SpO2: 96% (04-23-19 @ 04:22) (95% - 98%)  Wt(kg): --I&O's Summary    21 Apr 2019 07:01  -  22 Apr 2019 07:00  --------------------------------------------------------  IN: 0 mL / OUT: 200 mL / NET: -200 mL    22 Apr 2019 07:01  -  23 Apr 2019 06:35  --------------------------------------------------------  IN: 1230 mL / OUT: 203 mL / NET: 1027 mL        O:  Physical exam:  Gen: NAD  R hip: 5cm fluctuant mass on proximal incision line, in line with scar, compartments are mildly edematous but compressible  Calves Soft, Non-tender bilaterally  +PF/DF/EHL/FHL  SILT  +DP Pulse           Labs:                        10.4   8.4   )-----------( 297      ( 23 Apr 2019 04:44 )             31.2    04-23    142  |  108  |  15  ----------------------------<  119<H>  3.7   |  23  |  0.56    Ca    8.6      23 Apr 2019 04:44    TPro  7.8  /  Alb  3.0<L>  /  TBili  0.3  /  DBili  x   /  AST  25  /  ALT  16  /  AlkPhos  108  04-21

## 2019-04-23 NOTE — PRE-OP CHECKLIST - NOTHING BY MOUTH SINCE
Normal vision: sees adequately in most situations; can see medication labels, newsprint 22-Apr-2019 23:00

## 2019-04-23 NOTE — PROVIDER CONTACT NOTE (OTHER) - ASSESSMENT
Pt restless most of the shift. Trying to get OOB multiple times. C/O hip pain. No c/o chest pain or SOB. Pt  received norvasc, labatelol, and lisinopril first and then clonidine administered for b/p management. MS contin also administered for pain.

## 2019-04-23 NOTE — CHART NOTE - NSCHARTNOTEFT_GEN_A_CORE
Seen in RR  received 2 units prbc's intra-op  Ptc /o incisional pain    No Chest Pain, SOB, N/V.    T(C): 36 (04-23-19 @ 21:15), Max: 37 (04-23-19 @ 00:35)  HR: 70 (04-23-19 @ 21:15) (66 - 88)  BP: 121/59 (04-23-19 @ 21:15) (91/51 - 193/76)  RR: 16 (04-23-19 @ 21:15) (14 - 19)  SpO2: 95% (04-23-19 @ 21:15) (95% - 99%)  Wt(kg): --    Exam:  Alert and [place], No Acute Distress  Card: +S1/S2, RRR  Pulm: CTAB  Abdomen soft / benign  Silva  [ Y]   EXT   RLE         Aquacel dressings w/small areas of saturation noted (dime - sized)       Calves soft       (+) motor all digits         (+) swelling noted        No Sensory Deficits noted        2+ pulses via DOPPLER @ bedside                          10.6<L>  10.2  )-----------( 319      ( 23 Apr 2019 19:39 )             31.0<L>     04-23    138  |  106  |  10  ----------------------------<  161<H>  4.3   |  23  |  0.77        A/P: S/p Removal of hip prosthesis  Revision, total arthroplasty, knee, stage 1    - Follow up OR Cx  -PT:STRICT NWB RLE posterior precautions  -Chk AM Labs  -DVT PPx:Ecotrin  -  cont 1:1  -Pain Control PO/IV Pain Rx: Dilaudid/ valium / IV /PO tylenol  -monitor closely        ***See Above  [facility]  Orthopedics  B: [id number]  S: [phone]

## 2019-04-23 NOTE — PROVIDER CONTACT NOTE (OTHER) - ACTION/TREATMENT ORDERED:
No further interventions ordered at this time. PA stated to continue monitoring and to inform him if pt becomes symptomatic.

## 2019-04-23 NOTE — PROVIDER CONTACT NOTE (OTHER) - ASSESSMENT
Pt AOx4, but forgetful, continuing to activate the bed alarm trying to get OOB. Pt refusing to sit in chair at the nursing section, for closer observation.

## 2019-04-23 NOTE — PROVIDER CONTACT NOTE (OTHER) - ACTION/TREATMENT ORDERED:
Move pt closer to Nursing station, EKG in chart and haldol 5mg IVP given; supervisor Ajay made aware, bed alarm remains activated. Reinforced safety precautions to Pt.  Will continue to monitor.

## 2019-04-23 NOTE — PROVIDER CONTACT NOTE (OTHER) - ASSESSMENT
Pt stated needs privacy to use bathroom, pt started drinking water from faucet. Pt Pre Op for surgery today been NPO since MN. Pt states she is thirsty, and was able to drink before previous surgery. Green swabs at bedside and given.  IV fluids in progress.

## 2019-04-23 NOTE — PROGRESS NOTE ADULT - SUBJECTIVE AND OBJECTIVE BOX
Subjective: Patient seen and examined. No new events except as noted.     SUBJECTIVE/ROS:  No chest pain, dyspnea, palpitation, or dizziness.       MEDICATIONS:  MEDICATIONS  (STANDING):  acetaminophen   Tablet .. 975 milliGRAM(s) Oral every 8 hours  amLODIPine   Tablet 10 milliGRAM(s) Oral daily  aspirin enteric coated 81 milliGRAM(s) Oral daily  chlorhexidine 4% Liquid 1 Application(s) Topical once  cloNIDine 0.1 milliGRAM(s) Oral daily  docusate sodium 100 milliGRAM(s) Oral three times a day  labetalol 100 milliGRAM(s) Oral three times a day  lactated ringers. 1000 milliLiter(s) (75 mL/Hr) IV Continuous <Continuous>  lisinopril 10 milliGRAM(s) Oral daily  morphine ER Tablet 15 milliGRAM(s) Oral every 8 hours  pantoprazole    Tablet 40 milliGRAM(s) Oral before breakfast  senna 2 Tablet(s) Oral at bedtime  sodium chloride 0.9%. 1000 milliLiter(s) (75 mL/Hr) IV Continuous <Continuous>      PHYSICAL EXAM:  T(C): 37 (04-23-19 @ 04:22), Max: 37 (04-23-19 @ 00:35)  HR: 87 (04-23-19 @ 04:22) (66 - 87)  BP: 193/76 (04-23-19 @ 04:22) (148/71 - 193/76)  RR: 18 (04-23-19 @ 04:22) (1 - 18)  SpO2: 96% (04-23-19 @ 04:22) (95% - 98%)  Wt(kg): --  I&O's Summary    21 Apr 2019 07:01  -  22 Apr 2019 07:00  --------------------------------------------------------  IN: 0 mL / OUT: 200 mL / NET: -200 mL    22 Apr 2019 07:01  -  23 Apr 2019 06:59  --------------------------------------------------------  IN: 1230 mL / OUT: 203 mL / NET: 1027 mL            JVP: Normal  Neck: supple  Lung: clear   CV: S1 S2 , Murmur: gardenia   Abd: soft  Ext: No edema  neuro: Awake / alert  Psych: flat affect  Skin: normal``    LABS/DATA:    CARDIAC MARKERS:                                10.4   8.4   )-----------( 297      ( 23 Apr 2019 04:44 )             31.2     04-23    142  |  108  |  15  ----------------------------<  119<H>  3.7   |  23  |  0.56    Ca    8.6      23 Apr 2019 04:44    TPro  7.8  /  Alb  3.0<L>  /  TBili  0.3  /  DBili  x   /  AST  25  /  ALT  16  /  AlkPhos  108  04-21    proBNP:   Lipid Profile:   HgA1c:   TSH:     TELE:  EKG:  < from: Transesophageal Echocardiogram (04.22.19 @ 00:59) >  Conclusions:  1. Calcified trileaflet aortic valve with decreased  opening. mean transaortic valve gradient equals 16 mm Hg,  estimated aortic valve area equals 1.4 sqcm (by  planimetry), consistent with moderate aortic stenosis.  Mild-moderate aortic regurgitation.  2. Hyperdynamic left ventricular systolic function.  3. Normal right ventricular size and function.  *** No previous Echo exam.    < end of copied text >

## 2019-04-24 LAB
ANION GAP SERPL CALC-SCNC: 9 MMOL/L — SIGNIFICANT CHANGE UP (ref 5–17)
BUN SERPL-MCNC: 12 MG/DL — SIGNIFICANT CHANGE UP (ref 7–23)
CALCIUM SERPL-MCNC: 7.7 MG/DL — LOW (ref 8.4–10.5)
CHLORIDE SERPL-SCNC: 104 MMOL/L — SIGNIFICANT CHANGE UP (ref 96–108)
CO2 SERPL-SCNC: 26 MMOL/L — SIGNIFICANT CHANGE UP (ref 22–31)
CREAT SERPL-MCNC: 0.98 MG/DL — SIGNIFICANT CHANGE UP (ref 0.5–1.3)
GLUCOSE SERPL-MCNC: 123 MG/DL — HIGH (ref 70–99)
GRAM STN FLD: SIGNIFICANT CHANGE UP
HCT VFR BLD CALC: 25.6 % — LOW (ref 34.5–45)
HGB BLD-MCNC: 8.2 G/DL — LOW (ref 11.5–15.5)
MCHC RBC-ENTMCNC: 28.2 PG — SIGNIFICANT CHANGE UP (ref 27–34)
MCHC RBC-ENTMCNC: 32 GM/DL — SIGNIFICANT CHANGE UP (ref 32–36)
MCV RBC AUTO: 88 FL — SIGNIFICANT CHANGE UP (ref 80–100)
PLATELET # BLD AUTO: 221 K/UL — SIGNIFICANT CHANGE UP (ref 150–400)
POTASSIUM SERPL-MCNC: 4 MMOL/L — SIGNIFICANT CHANGE UP (ref 3.5–5.3)
POTASSIUM SERPL-SCNC: 4 MMOL/L — SIGNIFICANT CHANGE UP (ref 3.5–5.3)
RBC # BLD: 2.91 M/UL — LOW (ref 3.8–5.2)
RBC # FLD: 16.4 % — HIGH (ref 10.3–14.5)
SODIUM SERPL-SCNC: 139 MMOL/L — SIGNIFICANT CHANGE UP (ref 135–145)
SPECIMEN SOURCE: SIGNIFICANT CHANGE UP
WBC # BLD: 6.75 K/UL — SIGNIFICANT CHANGE UP (ref 3.8–10.5)
WBC # FLD AUTO: 6.75 K/UL — SIGNIFICANT CHANGE UP (ref 3.8–10.5)

## 2019-04-24 PROCEDURE — 99233 SBSQ HOSP IP/OBS HIGH 50: CPT

## 2019-04-24 RX ADMIN — Medication 1000 MILLIGRAM(S): at 11:50

## 2019-04-24 RX ADMIN — HYDROMORPHONE HYDROCHLORIDE 4 MILLIGRAM(S): 2 INJECTION INTRAMUSCULAR; INTRAVENOUS; SUBCUTANEOUS at 11:50

## 2019-04-24 RX ADMIN — Medication 30 MILLIGRAM(S): at 00:34

## 2019-04-24 RX ADMIN — Medication 30 MILLIGRAM(S): at 17:12

## 2019-04-24 RX ADMIN — Medication 100 MILLIGRAM(S): at 21:31

## 2019-04-24 RX ADMIN — Medication 400 MILLIGRAM(S): at 05:28

## 2019-04-24 RX ADMIN — Medication 100 MILLIGRAM(S): at 13:29

## 2019-04-24 RX ADMIN — Medication 1 TABLET(S): at 05:31

## 2019-04-24 RX ADMIN — HYDROMORPHONE HYDROCHLORIDE 6 MILLIGRAM(S): 2 INJECTION INTRAMUSCULAR; INTRAVENOUS; SUBCUTANEOUS at 23:31

## 2019-04-24 RX ADMIN — TRAMADOL HYDROCHLORIDE 50 MILLIGRAM(S): 50 TABLET ORAL at 21:30

## 2019-04-24 RX ADMIN — Medication 30 MILLIGRAM(S): at 23:31

## 2019-04-24 RX ADMIN — Medication 3 MILLIGRAM(S): at 21:30

## 2019-04-24 RX ADMIN — POLYETHYLENE GLYCOL 3350 17 GRAM(S): 17 POWDER, FOR SOLUTION ORAL at 12:27

## 2019-04-24 RX ADMIN — Medication 1000 MILLIGRAM(S): at 22:00

## 2019-04-24 RX ADMIN — Medication 30 MILLIGRAM(S): at 05:30

## 2019-04-24 RX ADMIN — HYDROMORPHONE HYDROCHLORIDE 6 MILLIGRAM(S): 2 INJECTION INTRAMUSCULAR; INTRAVENOUS; SUBCUTANEOUS at 23:01

## 2019-04-24 RX ADMIN — Medication 100 MILLIGRAM(S): at 05:31

## 2019-04-24 RX ADMIN — Medication 1 TABLET(S): at 13:27

## 2019-04-24 RX ADMIN — Medication 30 MILLIGRAM(S): at 06:16

## 2019-04-24 RX ADMIN — Medication 250 MILLIGRAM(S): at 17:12

## 2019-04-24 RX ADMIN — TRAMADOL HYDROCHLORIDE 50 MILLIGRAM(S): 50 TABLET ORAL at 13:27

## 2019-04-24 RX ADMIN — TRAMADOL HYDROCHLORIDE 50 MILLIGRAM(S): 50 TABLET ORAL at 14:00

## 2019-04-24 RX ADMIN — Medication 250 MILLIGRAM(S): at 01:43

## 2019-04-24 RX ADMIN — Medication 30 MILLIGRAM(S): at 11:23

## 2019-04-24 RX ADMIN — Medication 30 MILLIGRAM(S): at 23:03

## 2019-04-24 RX ADMIN — HYDROMORPHONE HYDROCHLORIDE 1 MILLIGRAM(S): 2 INJECTION INTRAMUSCULAR; INTRAVENOUS; SUBCUTANEOUS at 20:53

## 2019-04-24 RX ADMIN — Medication 100 MILLIGRAM(S): at 05:30

## 2019-04-24 RX ADMIN — MORPHINE SULFATE 15 MILLIGRAM(S): 50 CAPSULE, EXTENDED RELEASE ORAL at 22:00

## 2019-04-24 RX ADMIN — MORPHINE SULFATE 15 MILLIGRAM(S): 50 CAPSULE, EXTENDED RELEASE ORAL at 05:32

## 2019-04-24 RX ADMIN — Medication 400 MILLIGRAM(S): at 11:23

## 2019-04-24 RX ADMIN — Medication 325 MILLIGRAM(S): at 05:31

## 2019-04-24 RX ADMIN — MORPHINE SULFATE 15 MILLIGRAM(S): 50 CAPSULE, EXTENDED RELEASE ORAL at 06:16

## 2019-04-24 RX ADMIN — HYDROMORPHONE HYDROCHLORIDE 4 MILLIGRAM(S): 2 INJECTION INTRAMUSCULAR; INTRAVENOUS; SUBCUTANEOUS at 11:23

## 2019-04-24 RX ADMIN — Medication 30 MILLIGRAM(S): at 17:30

## 2019-04-24 RX ADMIN — Medication 1 TABLET(S): at 12:27

## 2019-04-24 RX ADMIN — Medication 325 MILLIGRAM(S): at 12:27

## 2019-04-24 RX ADMIN — Medication 400 MILLIGRAM(S): at 21:29

## 2019-04-24 RX ADMIN — PANTOPRAZOLE SODIUM 40 MILLIGRAM(S): 20 TABLET, DELAYED RELEASE ORAL at 05:33

## 2019-04-24 RX ADMIN — HYDROMORPHONE HYDROCHLORIDE 6 MILLIGRAM(S): 2 INJECTION INTRAMUSCULAR; INTRAVENOUS; SUBCUTANEOUS at 15:35

## 2019-04-24 RX ADMIN — Medication 30 MILLIGRAM(S): at 01:04

## 2019-04-24 RX ADMIN — Medication 1000 MILLIGRAM(S): at 06:16

## 2019-04-24 RX ADMIN — MORPHINE SULFATE 15 MILLIGRAM(S): 50 CAPSULE, EXTENDED RELEASE ORAL at 21:30

## 2019-04-24 RX ADMIN — HYDROMORPHONE HYDROCHLORIDE 6 MILLIGRAM(S): 2 INJECTION INTRAMUSCULAR; INTRAVENOUS; SUBCUTANEOUS at 16:05

## 2019-04-24 RX ADMIN — Medication 1 MILLIGRAM(S): at 12:27

## 2019-04-24 RX ADMIN — Medication 325 MILLIGRAM(S): at 17:12

## 2019-04-24 RX ADMIN — Medication 100 MILLIGRAM(S): at 13:27

## 2019-04-24 RX ADMIN — HYDROMORPHONE HYDROCHLORIDE 1 MILLIGRAM(S): 2 INJECTION INTRAMUSCULAR; INTRAVENOUS; SUBCUTANEOUS at 21:30

## 2019-04-24 RX ADMIN — MORPHINE SULFATE 15 MILLIGRAM(S): 50 CAPSULE, EXTENDED RELEASE ORAL at 13:27

## 2019-04-24 RX ADMIN — Medication 30 MILLIGRAM(S): at 11:50

## 2019-04-24 RX ADMIN — TRAMADOL HYDROCHLORIDE 50 MILLIGRAM(S): 50 TABLET ORAL at 22:00

## 2019-04-24 RX ADMIN — SODIUM CHLORIDE 500 MILLILITER(S): 9 INJECTION INTRAMUSCULAR; INTRAVENOUS; SUBCUTANEOUS at 05:34

## 2019-04-24 RX ADMIN — Medication 1 TABLET(S): at 21:30

## 2019-04-24 RX ADMIN — Medication 5 MILLIGRAM(S): at 17:12

## 2019-04-24 RX ADMIN — MORPHINE SULFATE 15 MILLIGRAM(S): 50 CAPSULE, EXTENDED RELEASE ORAL at 14:00

## 2019-04-24 NOTE — PROGRESS NOTE ADULT - SUBJECTIVE AND OBJECTIVE BOX
Follow Up:  hardware infection with prosthetic joint infection, cellulitis    Interval History: pt stable and afebrile, s/p hardware removal yesterday    ROS:      All other systems negative    Constitutional: no fever, chills  Eye: no eye pain, no redness, no vision changes  ENT:  no sore throat, no rhinorrhea  Cardiovascular:  no chest pain, no palpitation  Respiratory:  no SOB, no cough  GI:  no abd pain, no vomiting, no diarrhea  urinary: no dysuria, no hematuria, no flank pain  : no  discharge or bleeding  musculoskeletal:  R hip pain  skin:  no rash  neurology:  no headache, no seizure, no change in mental status  psych: +agitation        Allergies  No Known Allergies        ANTIMICROBIALS:  vancomycin  IVPB 1000 every 12 hours      OTHER MEDS:  acetaminophen  IVPB .. 1000 milliGRAM(s) IV Intermittent once  acetaminophen  IVPB .. 1000 milliGRAM(s) IV Intermittent once  ALBUTerol/ipratropium for Nebulization 3 milliLiter(s) Nebulizer every 6 hours PRN  aluminum hydroxide/magnesium hydroxide/simethicone Suspension 30 milliLiter(s) Oral four times a day PRN  amLODIPine   Tablet 10 milliGRAM(s) Oral daily  aspirin enteric coated 325 milliGRAM(s) Oral two times a day  calcium carbonate 1250 mG  + Vitamin D (OsCal 500 + D) 1 Tablet(s) Oral three times a day  cloNIDine 0.1 milliGRAM(s) Oral two times a day  diazepam    Tablet 5 milliGRAM(s) Oral every 8 hours PRN  docusate sodium 100 milliGRAM(s) Oral three times a day  ferrous    sulfate 325 milliGRAM(s) Oral daily  folic acid 1 milliGRAM(s) Oral daily  HYDROmorphone   Tablet 4 milliGRAM(s) Oral every 4 hours PRN  HYDROmorphone   Tablet 6 milliGRAM(s) Oral every 4 hours PRN  HYDROmorphone  Injectable 1 milliGRAM(s) IV Push every 3 hours PRN  ketorolac   Injectable 30 milliGRAM(s) IV Push every 6 hours  labetalol 100 milliGRAM(s) Oral three times a day  lactated ringers. 1000 milliLiter(s) IV Continuous <Continuous>  lisinopril 10 milliGRAM(s) Oral daily  magnesium hydroxide Suspension 30 milliLiter(s) Oral daily PRN  melatonin 3 milliGRAM(s) Oral at bedtime PRN  morphine ER Tablet 15 milliGRAM(s) Oral every 8 hours  multivitamin 1 Tablet(s) Oral daily  ondansetron Injectable 4 milliGRAM(s) IV Push every 6 hours PRN  pantoprazole    Tablet 40 milliGRAM(s) Oral before breakfast  polyethylene glycol 3350 17 Gram(s) Oral daily  senna 2 Tablet(s) Oral at bedtime PRN  traMADol 50 milliGRAM(s) Oral every 8 hours      Vital Signs Last 24 Hrs  T(C): 36.9 (24 Apr 2019 08:49), Max: 36.9 (23 Apr 2019 13:28)  T(F): 98.4 (24 Apr 2019 08:49), Max: 98.4 (23 Apr 2019 13:28)  HR: 71 (24 Apr 2019 08:49) (67 - 82)  BP: 113/60 (24 Apr 2019 08:49) (91/51 - 186/76)  BP(mean): 82 (23 Apr 2019 21:15) (61 - 85)  RR: 18 (24 Apr 2019 08:49) (14 - 19)  SpO2: 96% (24 Apr 2019 08:49) (95% - 99%)    Physical Exam:  General:    NAD,  non toxic  Head: atraumatic, normocephalic  Eye: normal sclera and conjunctiva  ENT:    no oropharyngeal lesions,   no LAD,   neck supple  Cardio:     regular S1, S2,  no murmur  Respiratory:    clear b/l,    no wheezing  abd:     soft,   BS +,   no tenderness,    no organomegaly  :   no CVAT,  no suprapubic tenderness,  + edmondson  Musculoskeletal: R hip and thigh with dressing  vascular: no phlebitis, normal pulses  Skin:    no rash  Neurologic:     no focal deficit  psych: now calm but on 1:1 for agitation                          8.2    6.75  )-----------( 221      ( 24 Apr 2019 09:43 )             25.6       04-24    139  |  104  |  12  ----------------------------<  123<H>  4.0   |  26  |  0.98    Ca    7.7<L>      24 Apr 2019 05:43            MICROBIOLOGY:  v  .Tissue Right Hip Skin  04-24-19 --  --    No polymorphonuclear cells seen per low power field  No organisms seen per oil power field      .Blood  04-22-19   No growth to date.  --  --      .Blood  04-21-19   No growth to date.  --  --                RADIOLOGY:  Images below reviewed personally  < from: Xray Pelvis AP only (04.23.19 @ 19:30) >    IMPRESSION: Postsurgical changes status post removal of hardware from the   proximal right femur, including removal of right hip arthroplasty and   lateral plate and screws, as well as cerclage wires. Redemonstrated   extensive heterotopic ossification about the proximal femur.   Redemonstrated lucency extending through the medial aspect of the   proximal femoral shaft consistent with fracture. Multiple lucent hardware   tracts in the femur. Lucent defect in the cortex of the lateral aspect of   the proximal femoral shaft, question related to prior hardware or old   trauma. Status post placement of spacer in the right hip. Opacities   projecting in the soft tissues at the lateralaspect of the proximal   femoral shaft and tubular opacity extending over the proximal thigh and   inguinal region, question external to patient or Edmondson catheter;   correlate clinically. Vascular calcifications. Postsurgical changes in   the soft tissues.

## 2019-04-24 NOTE — PHYSICAL THERAPY INITIAL EVALUATION ADULT - LEVEL OF INDEPENDENCE: STAND/SIT, REHAB EVAL
mod 1 PT in front for support, min of another maintaining hip precautions./moderate assist (50% patients effort)/minimum assist (75% patients effort)

## 2019-04-24 NOTE — OCCUPATIONAL THERAPY INITIAL EVALUATION ADULT - ADDITIONAL COMMENTS
Removal of hip prosthesis 23-Apr-2019 19:30:20. CT LE (4/21): Redemonstrated post surgical changes status post plate and screw fixation of fractures in the mid to proximal femoral shaft, with some persistent lucency at these chronic fracture sites. Linear lucency in the heterotopic ossification and medial cortex of the proximal femoral shaft, likely representing acute or subacute fracture. Correlate clinically.   Loosening of the proximal right foraminal component redemonstrated.

## 2019-04-24 NOTE — PHYSICAL THERAPY INITIAL EVALUATION ADULT - GENERAL OBSERVATIONS, REHAB EVAL
Pt tolerated 45min PT initial evaluation well. Pt s/p USAMA stage 1, YARI spacer placement, NWB RLE, strict pos precautions. Pt A&Ox3 follows commands 100% of time, 1:1 present throughout session.

## 2019-04-24 NOTE — OCCUPATIONAL THERAPY INITIAL EVALUATION ADULT - STRENGTHENING, PT EVAL
GOAL: Pt will increase RLE strength to 3+/5 to increase participation in functional tasks in 4 weeks

## 2019-04-24 NOTE — PHYSICAL THERAPY INITIAL EVALUATION ADULT - PLANNED THERAPY INTERVENTIONS, PT EVAL
Stair training... GOAL: In 4 weeks pt will negotiate 6 stairs independently with least restrictive device./gait training/bed mobility training/strengthening/balance training/transfer training

## 2019-04-24 NOTE — PHYSICAL THERAPY INITIAL EVALUATION ADULT - GAIT DEVIATIONS NOTED, PT EVAL
decreased velocity of limb motion/decreased stride length/decreased jaylene/decreased step length/decreased swing-to-stance ratio/shuffled steps/decreased weight-shifting ability

## 2019-04-24 NOTE — PHYSICAL THERAPY INITIAL EVALUATION ADULT - TRANSFER SAFETY CONCERNS NOTED: SIT/STAND, REHAB EVAL
decreased step length/inability to maintain weight-bearing restrictions w/o assist/decreased balance during turns/decreased weight-shifting ability/stand pivot

## 2019-04-24 NOTE — OCCUPATIONAL THERAPY INITIAL EVALUATION ADULT - RANGE OF MOTION EXAMINATION, LOWER EXTREMITY
Left LE Active ROM was WFL (within functional limits)/Right LE Active Assistive ROM was WNL  (within normal limits)

## 2019-04-24 NOTE — PROGRESS NOTE ADULT - SUBJECTIVE AND OBJECTIVE BOX
Pt seen/examined. Doing well. Pain controlled. On 1-1 for agitation    T(C): 36.8 (04-24-19 @ 04:44), Max: 36.9 (04-23-19 @ 13:28)  HR: 70 (04-24-19 @ 04:44) (67 - 88)  BP: 109/57 (04-24-19 @ 04:44) (91/51 - 190/68)  RR: 18 (04-24-19 @ 04:44) (14 - 19)  SpO2: 98% (04-24-19 @ 04:44) (95% - 99%)  Wt(kg): --    - Gen: NAD  - RLE: Dressing C/D/I; SILT TN/SPN/DPN/SN; TA/EHL/gs intact    73yFemale s/p R femur YARI, spacer    - Pain control  - DVT ppx  - OOB/PT  - FU labs  - FU medicine  - 1-1 till agitation decreasses

## 2019-04-24 NOTE — PHYSICAL THERAPY INITIAL EVALUATION ADULT - LEVEL OF INDEPENDENCE: SIT/STAND, REHAB EVAL
mod 1 PT in front for support, min of another maintaining hip precautions./minimum assist (75% patients effort)/moderate assist (50% patients effort)

## 2019-04-24 NOTE — PROGRESS NOTE ADULT - ASSESSMENT
HTN  cont current meds  much better controlled     CAD history of stent  cont asa  add statin in future     AS  stable  Moderate     Septic joint   recurrent  fu with ID   anbx

## 2019-04-24 NOTE — PROGRESS NOTE ADULT - SUBJECTIVE AND OBJECTIVE BOX
Subjective: Patient seen and examined. No new events except as noted.     SUBJECTIVE/ROS:  feels ok     MEDICATIONS:  MEDICATIONS  (STANDING):  acetaminophen  IVPB .. 1000 milliGRAM(s) IV Intermittent once  amLODIPine   Tablet 10 milliGRAM(s) Oral daily  aspirin enteric coated 325 milliGRAM(s) Oral two times a day  calcium carbonate 1250 mG  + Vitamin D (OsCal 500 + D) 1 Tablet(s) Oral three times a day  cloNIDine 0.1 milliGRAM(s) Oral two times a day  docusate sodium 100 milliGRAM(s) Oral three times a day  ferrous    sulfate 325 milliGRAM(s) Oral daily  folic acid 1 milliGRAM(s) Oral daily  ketorolac   Injectable 30 milliGRAM(s) IV Push every 6 hours  labetalol 100 milliGRAM(s) Oral three times a day  lactated ringers. 1000 milliLiter(s) (75 mL/Hr) IV Continuous <Continuous>  lisinopril 10 milliGRAM(s) Oral daily  morphine ER Tablet 15 milliGRAM(s) Oral every 8 hours  multivitamin 1 Tablet(s) Oral daily  pantoprazole    Tablet 40 milliGRAM(s) Oral before breakfast  polyethylene glycol 3350 17 Gram(s) Oral daily  traMADol 50 milliGRAM(s) Oral every 8 hours  vancomycin  IVPB 1000 milliGRAM(s) IV Intermittent every 12 hours      PHYSICAL EXAM:  T(C): 36.7 (04-24-19 @ 13:29), Max: 36.9 (04-24-19 @ 08:49)  HR: 72 (04-24-19 @ 13:29) (67 - 82)  BP: 121/54 (04-24-19 @ 13:29) (91/51 - 138/59)  RR: 18 (04-24-19 @ 13:29) (14 - 19)  SpO2: 96% (04-24-19 @ 13:29) (95% - 99%)  Wt(kg): --  I&O's Summary    23 Apr 2019 07:01  -  24 Apr 2019 07:00  --------------------------------------------------------  IN: 2145 mL / OUT: 1130 mL / NET: 1015 mL    24 Apr 2019 07:01  -  24 Apr 2019 15:51  --------------------------------------------------------  IN: 480 mL / OUT: 275 mL / NET: 205 mL            JVP: Normal  Neck: supple  Lung: clear   CV: S1 S2 , Murmur:  Abd: soft  Ext: No edema  neuro: Awake   Psych: flat affect  Skin: normal``    LABS/DATA:    CARDIAC MARKERS:                                8.2    6.75  )-----------( 221      ( 24 Apr 2019 09:43 )             25.6     04-24    139  |  104  |  12  ----------------------------<  123<H>  4.0   |  26  |  0.98    Ca    7.7<L>      24 Apr 2019 05:43      proBNP:   Lipid Profile:   HgA1c:   TSH:     TELE:  EKG:

## 2019-04-24 NOTE — PHYSICAL THERAPY INITIAL EVALUATION ADULT - PERTINENT HX OF CURRENT PROBLEM, REHAB EVAL
73F w/ hx of seizure d/o, EtOH abuse, active 1 ppd smoker, CAD s/p stents (remote),R FN fx s/p CRPP (2017) c/b AVN s/p R USAMA (Dr. Jaime, 5/18) c/b vanc B2 PP Fx s/p ORIF/rev USAMA (Dr. Be 6/18), c/b recurrent PP fx s/p ORIF (Dr. Cornell 7/18), c/b MRSA infection s/p I&D (Dr. Be, 8/18). Today she presents c/o worsening R leg pain to the point that she can no longer ambulate. Pt s/p YARI R hip with antibiotic spacer placement 4/23/19.

## 2019-04-24 NOTE — OCCUPATIONAL THERAPY INITIAL EVALUATION ADULT - PERTINENT HX OF CURRENT PROBLEM, REHAB EVAL
73F w/ hx of seizure d/o, EtOH abuse, active 1 ppd smoker, CAD s/p stents (remote), HTN, HLD, R FN fx s/p CRPP (2017, [facility]) c/b AVN s/p R USAMA (Dr. Jaime, 5/18) c/b vanc B2 PP Fx s/p ORIF/rev USAMA (Dr. Be 6/18), c/b recurrent PP fx s/p ORIF (Dr. Cornell 7/18), c/b MRSA infection s/p I&D (Dr. Be, 8/18). Today she presents c/o worsening R leg pain to the point that she can no longer ambulate.

## 2019-04-25 LAB
-  AMPICILLIN/SULBACTAM: SIGNIFICANT CHANGE UP
-  CEFAZOLIN: SIGNIFICANT CHANGE UP
-  CLINDAMYCIN: SIGNIFICANT CHANGE UP
-  DAPTOMYCIN: SIGNIFICANT CHANGE UP
-  ERYTHROMYCIN: SIGNIFICANT CHANGE UP
-  GENTAMICIN: SIGNIFICANT CHANGE UP
-  LINEZOLID: SIGNIFICANT CHANGE UP
-  OXACILLIN: SIGNIFICANT CHANGE UP
-  PENICILLIN: SIGNIFICANT CHANGE UP
-  RIFAMPIN: SIGNIFICANT CHANGE UP
-  TETRACYCLINE: SIGNIFICANT CHANGE UP
-  TRIMETHOPRIM/SULFAMETHOXAZOLE: SIGNIFICANT CHANGE UP
-  VANCOMYCIN: SIGNIFICANT CHANGE UP
ANION GAP SERPL CALC-SCNC: 8 MMOL/L — SIGNIFICANT CHANGE UP (ref 5–17)
BUN SERPL-MCNC: 14 MG/DL — SIGNIFICANT CHANGE UP (ref 7–23)
CALCIUM SERPL-MCNC: 7.7 MG/DL — LOW (ref 8.4–10.5)
CHLORIDE SERPL-SCNC: 104 MMOL/L — SIGNIFICANT CHANGE UP (ref 96–108)
CO2 SERPL-SCNC: 25 MMOL/L — SIGNIFICANT CHANGE UP (ref 22–31)
CREAT SERPL-MCNC: 0.74 MG/DL — SIGNIFICANT CHANGE UP (ref 0.5–1.3)
GLUCOSE SERPL-MCNC: 103 MG/DL — HIGH (ref 70–99)
HCT VFR BLD CALC: 21.8 % — LOW (ref 34.5–45)
HCT VFR BLD CALC: 24.7 % — LOW (ref 34.5–45)
HGB BLD-MCNC: 7.2 G/DL — LOW (ref 11.5–15.5)
HGB BLD-MCNC: 8.4 G/DL — LOW (ref 11.5–15.5)
MCHC RBC-ENTMCNC: 29.3 PG — SIGNIFICANT CHANGE UP (ref 27–34)
MCHC RBC-ENTMCNC: 30.6 PG — SIGNIFICANT CHANGE UP (ref 27–34)
MCHC RBC-ENTMCNC: 32.8 GM/DL — SIGNIFICANT CHANGE UP (ref 32–36)
MCHC RBC-ENTMCNC: 34.1 GM/DL — SIGNIFICANT CHANGE UP (ref 32–36)
MCV RBC AUTO: 89.1 FL — SIGNIFICANT CHANGE UP (ref 80–100)
MCV RBC AUTO: 89.7 FL — SIGNIFICANT CHANGE UP (ref 80–100)
METHOD TYPE: SIGNIFICANT CHANGE UP
PLATELET # BLD AUTO: 190 K/UL — SIGNIFICANT CHANGE UP (ref 150–400)
PLATELET # BLD AUTO: 198 K/UL — SIGNIFICANT CHANGE UP (ref 150–400)
POTASSIUM SERPL-MCNC: 4 MMOL/L — SIGNIFICANT CHANGE UP (ref 3.5–5.3)
POTASSIUM SERPL-SCNC: 4 MMOL/L — SIGNIFICANT CHANGE UP (ref 3.5–5.3)
RBC # BLD: 2.45 M/UL — LOW (ref 3.8–5.2)
RBC # BLD: 2.76 M/UL — LOW (ref 3.8–5.2)
RBC # FLD: 15.2 % — HIGH (ref 10.3–14.5)
RBC # FLD: 15.5 % — HIGH (ref 10.3–14.5)
SODIUM SERPL-SCNC: 137 MMOL/L — SIGNIFICANT CHANGE UP (ref 135–145)
VANCOMYCIN TROUGH SERPL-MCNC: 22.9 UG/ML — HIGH (ref 10–20)
WBC # BLD: 6.4 K/UL — SIGNIFICANT CHANGE UP (ref 3.8–10.5)
WBC # BLD: 7.9 K/UL — SIGNIFICANT CHANGE UP (ref 3.8–10.5)
WBC # FLD AUTO: 6.4 K/UL — SIGNIFICANT CHANGE UP (ref 3.8–10.5)
WBC # FLD AUTO: 7.9 K/UL — SIGNIFICANT CHANGE UP (ref 3.8–10.5)

## 2019-04-25 PROCEDURE — 99233 SBSQ HOSP IP/OBS HIGH 50: CPT

## 2019-04-25 RX ORDER — VANCOMYCIN HCL 1 G
750 VIAL (EA) INTRAVENOUS EVERY 12 HOURS
Qty: 0 | Refills: 0 | Status: DISCONTINUED | OUTPATIENT
Start: 2019-04-25 | End: 2019-05-02

## 2019-04-25 RX ORDER — ACETAMINOPHEN 500 MG
1000 TABLET ORAL ONCE
Qty: 0 | Refills: 0 | Status: COMPLETED | OUTPATIENT
Start: 2019-04-25 | End: 2019-04-25

## 2019-04-25 RX ADMIN — PANTOPRAZOLE SODIUM 40 MILLIGRAM(S): 20 TABLET, DELAYED RELEASE ORAL at 05:39

## 2019-04-25 RX ADMIN — MORPHINE SULFATE 15 MILLIGRAM(S): 50 CAPSULE, EXTENDED RELEASE ORAL at 06:06

## 2019-04-25 RX ADMIN — Medication 30 MILLIGRAM(S): at 17:20

## 2019-04-25 RX ADMIN — HYDROMORPHONE HYDROCHLORIDE 6 MILLIGRAM(S): 2 INJECTION INTRAMUSCULAR; INTRAVENOUS; SUBCUTANEOUS at 20:33

## 2019-04-25 RX ADMIN — Medication 30 MILLILITER(S): at 01:36

## 2019-04-25 RX ADMIN — Medication 1 TABLET(S): at 13:20

## 2019-04-25 RX ADMIN — TRAMADOL HYDROCHLORIDE 50 MILLIGRAM(S): 50 TABLET ORAL at 13:21

## 2019-04-25 RX ADMIN — Medication 250 MILLIGRAM(S): at 17:20

## 2019-04-25 RX ADMIN — Medication 325 MILLIGRAM(S): at 11:07

## 2019-04-25 RX ADMIN — Medication 325 MILLIGRAM(S): at 17:21

## 2019-04-25 RX ADMIN — Medication 100 MILLIGRAM(S): at 13:20

## 2019-04-25 RX ADMIN — Medication 30 MILLIGRAM(S): at 11:11

## 2019-04-25 RX ADMIN — Medication 0.1 MILLIGRAM(S): at 05:38

## 2019-04-25 RX ADMIN — Medication 5 MILLIGRAM(S): at 01:40

## 2019-04-25 RX ADMIN — Medication 30 MILLIGRAM(S): at 06:06

## 2019-04-25 RX ADMIN — HYDROMORPHONE HYDROCHLORIDE 6 MILLIGRAM(S): 2 INJECTION INTRAMUSCULAR; INTRAVENOUS; SUBCUTANEOUS at 12:08

## 2019-04-25 RX ADMIN — HYDROMORPHONE HYDROCHLORIDE 6 MILLIGRAM(S): 2 INJECTION INTRAMUSCULAR; INTRAVENOUS; SUBCUTANEOUS at 20:03

## 2019-04-25 RX ADMIN — TRAMADOL HYDROCHLORIDE 50 MILLIGRAM(S): 50 TABLET ORAL at 21:36

## 2019-04-25 RX ADMIN — Medication 0.1 MILLIGRAM(S): at 17:21

## 2019-04-25 RX ADMIN — HYDROMORPHONE HYDROCHLORIDE 6 MILLIGRAM(S): 2 INJECTION INTRAMUSCULAR; INTRAVENOUS; SUBCUTANEOUS at 16:33

## 2019-04-25 RX ADMIN — HYDROMORPHONE HYDROCHLORIDE 6 MILLIGRAM(S): 2 INJECTION INTRAMUSCULAR; INTRAVENOUS; SUBCUTANEOUS at 11:08

## 2019-04-25 RX ADMIN — Medication 1000 MILLIGRAM(S): at 23:10

## 2019-04-25 RX ADMIN — Medication 100 MILLIGRAM(S): at 21:06

## 2019-04-25 RX ADMIN — Medication 1 TABLET(S): at 11:07

## 2019-04-25 RX ADMIN — Medication 975 MILLIGRAM(S): at 06:06

## 2019-04-25 RX ADMIN — MORPHINE SULFATE 15 MILLIGRAM(S): 50 CAPSULE, EXTENDED RELEASE ORAL at 05:38

## 2019-04-25 RX ADMIN — Medication 975 MILLIGRAM(S): at 05:36

## 2019-04-25 RX ADMIN — MORPHINE SULFATE 15 MILLIGRAM(S): 50 CAPSULE, EXTENDED RELEASE ORAL at 21:06

## 2019-04-25 RX ADMIN — Medication 1 MILLIGRAM(S): at 11:07

## 2019-04-25 RX ADMIN — Medication 975 MILLIGRAM(S): at 13:21

## 2019-04-25 RX ADMIN — HYDROMORPHONE HYDROCHLORIDE 6 MILLIGRAM(S): 2 INJECTION INTRAMUSCULAR; INTRAVENOUS; SUBCUTANEOUS at 15:51

## 2019-04-25 RX ADMIN — Medication 100 MILLIGRAM(S): at 05:37

## 2019-04-25 RX ADMIN — MORPHINE SULFATE 15 MILLIGRAM(S): 50 CAPSULE, EXTENDED RELEASE ORAL at 14:21

## 2019-04-25 RX ADMIN — TRAMADOL HYDROCHLORIDE 50 MILLIGRAM(S): 50 TABLET ORAL at 14:21

## 2019-04-25 RX ADMIN — Medication 30 MILLIGRAM(S): at 11:15

## 2019-04-25 RX ADMIN — Medication 400 MILLIGRAM(S): at 22:41

## 2019-04-25 RX ADMIN — MORPHINE SULFATE 15 MILLIGRAM(S): 50 CAPSULE, EXTENDED RELEASE ORAL at 21:36

## 2019-04-25 RX ADMIN — TRAMADOL HYDROCHLORIDE 50 MILLIGRAM(S): 50 TABLET ORAL at 21:06

## 2019-04-25 RX ADMIN — Medication 100 MILLIGRAM(S): at 05:38

## 2019-04-25 RX ADMIN — LISINOPRIL 10 MILLIGRAM(S): 2.5 TABLET ORAL at 05:38

## 2019-04-25 RX ADMIN — Medication 30 MILLIGRAM(S): at 17:42

## 2019-04-25 RX ADMIN — Medication 30 MILLIGRAM(S): at 05:39

## 2019-04-25 RX ADMIN — POLYETHYLENE GLYCOL 3350 17 GRAM(S): 17 POWDER, FOR SOLUTION ORAL at 11:07

## 2019-04-25 RX ADMIN — Medication 975 MILLIGRAM(S): at 14:21

## 2019-04-25 RX ADMIN — MORPHINE SULFATE 15 MILLIGRAM(S): 50 CAPSULE, EXTENDED RELEASE ORAL at 13:21

## 2019-04-25 RX ADMIN — Medication 1 TABLET(S): at 05:37

## 2019-04-25 RX ADMIN — AMLODIPINE BESYLATE 10 MILLIGRAM(S): 2.5 TABLET ORAL at 05:37

## 2019-04-25 RX ADMIN — Medication 1 TABLET(S): at 21:06

## 2019-04-25 RX ADMIN — Medication 325 MILLIGRAM(S): at 05:42

## 2019-04-25 NOTE — PROGRESS NOTE ADULT - SUBJECTIVE AND OBJECTIVE BOX
Follow Up:  hardware infection with prosthetic joint infection, cellulitis    Interval History: pt stable and afebrile, the OR cultures with staph aureus    ROS:      All other systems negative    Constitutional: no fever, chills  Eye: no eye pain, no redness, no vision changes  ENT:  no sore throat, no rhinorrhea  Cardiovascular:  no chest pain, no palpitation  Respiratory:  no SOB, no cough  GI:  no abd pain, no vomiting, no diarrhea  urinary: no dysuria, no hematuria, no flank pain  : no  discharge or bleeding  musculoskeletal:  R hip pain  skin:  no rash  neurology:  no headache, no seizure, no change in mental status  psych: +agitation      Allergies  No Known Allergies        ANTIMICROBIALS:  vancomycin  IVPB 750 every 12 hours      OTHER MEDS:  acetaminophen   Tablet .. 975 milliGRAM(s) Oral every 8 hours  ALBUTerol/ipratropium for Nebulization 3 milliLiter(s) Nebulizer every 6 hours PRN  aluminum hydroxide/magnesium hydroxide/simethicone Suspension 30 milliLiter(s) Oral four times a day PRN  amLODIPine   Tablet 10 milliGRAM(s) Oral daily  aspirin enteric coated 325 milliGRAM(s) Oral two times a day  bisacodyl Suppository 10 milliGRAM(s) Rectal daily PRN  calcium carbonate 1250 mG  + Vitamin D (OsCal 500 + D) 1 Tablet(s) Oral three times a day  cloNIDine 0.1 milliGRAM(s) Oral two times a day  diazepam    Tablet 5 milliGRAM(s) Oral every 8 hours PRN  docusate sodium 100 milliGRAM(s) Oral three times a day  ferrous    sulfate 325 milliGRAM(s) Oral daily  folic acid 1 milliGRAM(s) Oral daily  HYDROmorphone   Tablet 4 milliGRAM(s) Oral every 4 hours PRN  HYDROmorphone   Tablet 6 milliGRAM(s) Oral every 4 hours PRN  HYDROmorphone  Injectable 1 milliGRAM(s) IV Push every 3 hours PRN  ketorolac   Injectable 30 milliGRAM(s) IV Push every 6 hours  labetalol 100 milliGRAM(s) Oral three times a day  lactated ringers. 1000 milliLiter(s) IV Continuous <Continuous>  lisinopril 10 milliGRAM(s) Oral daily  magnesium hydroxide Suspension 30 milliLiter(s) Oral daily PRN  melatonin 3 milliGRAM(s) Oral at bedtime PRN  morphine ER Tablet 15 milliGRAM(s) Oral every 8 hours  multivitamin 1 Tablet(s) Oral daily  ondansetron Injectable 4 milliGRAM(s) IV Push every 6 hours PRN  pantoprazole    Tablet 40 milliGRAM(s) Oral before breakfast  polyethylene glycol 3350 17 Gram(s) Oral daily  senna 2 Tablet(s) Oral at bedtime PRN  traMADol 50 milliGRAM(s) Oral every 8 hours      Vital Signs Last 24 Hrs  T(C): 36.8 (25 Apr 2019 11:00), Max: 37.1 (24 Apr 2019 17:00)  T(F): 98.3 (25 Apr 2019 11:00), Max: 98.7 (24 Apr 2019 17:00)  HR: 71 (25 Apr 2019 11:00) (71 - 95)  BP: 121/57 (25 Apr 2019 11:00) (91/48 - 128/72)  BP(mean): --  RR: 18 (25 Apr 2019 11:00) (18 - 18)  SpO2: 95% (25 Apr 2019 11:00) (93% - 96%)    Physical Exam:  General:    NAD,  non toxic  Head: atraumatic, normocephalic  Eye: normal sclera and conjunctiva  ENT:    no oropharyngeal lesions,   no LAD,   neck supple  Cardio:     regular S1, S2,  no murmur  Respiratory:    clear b/l,    no wheezing  abd:     soft,   BS +,   no tenderness,    no organomegaly  :   no CVAT,  no suprapubic tenderness,  + edmondson  Musculoskeletal: R hip and thigh with dressing  vascular: no phlebitis, normal pulses  Skin:    no rash  Neurologic:     no focal deficit  psych: now calm but on 1:1 for agitation                          7.2    6.4   )-----------( 198      ( 25 Apr 2019 05:08 )             21.8       04-25    137  |  104  |  14  ----------------------------<  103<H>  4.0   |  25  |  0.74    Ca    7.7<L>      25 Apr 2019 05:08            MICROBIOLOGY:  Vancomycin Level, Trough: 22.9 ug/mL (04-25-19 @ 05:08)  v  .Tissue Right Hip Skin  04-24-19   Rare Staphylococcus aureus  --    No polymorphonuclear cells seen per low power field  No organisms seen per oil power field      .Surgical Swab  04-24-19   Few Staphylococcus aureus  --  --      .Blood  04-22-19   No growth to date.  --  --      .Blood  04-21-19   No growth to date.  --  --                RADIOLOGY:  Images below reviewed personally  < from: Xray Pelvis AP only (04.23.19 @ 19:30) >  IMPRESSION: Postsurgical changes status post removal of hardware from the   proximal right femur, including removal of right hip arthroplasty and   lateral plate and screws, as well as cerclage wires. Redemonstrated   extensive heterotopic ossification about the proximal femur.   Redemonstrated lucency extending through the medial aspect of the   proximal femoral shaft consistent with fracture. Multiple lucent hardware   tracts in the femur. Lucent defect in the cortex of the lateral aspect of   the proximal femoral shaft, question related to prior hardware or old   trauma. Status post placement of spacer in the right hip. Opacities   projecting in the soft tissues at the lateralaspect of the proximal   femoral shaft and tubular opacity extending over the proximal thigh and   inguinal region, question external to patient or Edmondson catheter;   correlate clinically. Vascular calcifications. Postsurgical changes in   the soft tissues.

## 2019-04-25 NOTE — PROGRESS NOTE ADULT - ASSESSMENT
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 THR, kika-prosthetic R femur fx (s/p Total Hip revision with ORIF, 6/30/18), recent re-admission (07/16 - 07/24/18) for worsening R hip pain and decreased ability to ambulate now s/p R femur [place] B1 periprosthetic fracture ORIF (07/19/18) admitted 8/2018 with MRSA bacteremia and hardware collection s/p washout but the hardware was not removed, OR cxs also with MRSA she completed a course of vanco and then bactrim for suppressive therapy but again  presented with hip erythema and edema again the hardware was not removed in hope for femur union, and she received another course of vanco and then doxy and symptoms started after she finished the antibiotics and came back with fever, chills, erythema edema and fluctuation on the hip incision  here afebrile WBC normal  CT with lucency, fractures and cellulitis      hardware infection previously MRSA bacteremia and prosthetic joint infection with cellulitis and ?abscess  s/p hardware removal, purulence found along the entire lateral aspect of the R leg with loose hardware, spacer placement      * f/u the staph aureus sensitivities   * vanco level 22  * hold one dose and decrease to 750 q 12  * will need picc line and at least 6 weeks of antibiotics

## 2019-04-25 NOTE — PROGRESS NOTE ADULT - ASSESSMENT
HTN  cont current meds  much better controlled     CAD history of stent  cont asa    AS  stable  Moderate   stable     Anemia  Monitor hemoglobin, transfuse as needed.

## 2019-04-25 NOTE — PROGRESS NOTE ADULT - SUBJECTIVE AND OBJECTIVE BOX
Subjective: Patient seen and examined. No new events except as noted.     SUBJECTIVE/ROS:      MEDICATIONS:  MEDICATIONS  (STANDING):  acetaminophen   Tablet .. 975 milliGRAM(s) Oral every 8 hours  amLODIPine   Tablet 10 milliGRAM(s) Oral daily  aspirin enteric coated 325 milliGRAM(s) Oral two times a day  calcium carbonate 1250 mG  + Vitamin D (OsCal 500 + D) 1 Tablet(s) Oral three times a day  cloNIDine 0.1 milliGRAM(s) Oral two times a day  docusate sodium 100 milliGRAM(s) Oral three times a day  ferrous    sulfate 325 milliGRAM(s) Oral daily  folic acid 1 milliGRAM(s) Oral daily  ketorolac   Injectable 30 milliGRAM(s) IV Push every 6 hours  labetalol 100 milliGRAM(s) Oral three times a day  lactated ringers. 1000 milliLiter(s) (75 mL/Hr) IV Continuous <Continuous>  lisinopril 10 milliGRAM(s) Oral daily  morphine ER Tablet 15 milliGRAM(s) Oral every 8 hours  multivitamin 1 Tablet(s) Oral daily  pantoprazole    Tablet 40 milliGRAM(s) Oral before breakfast  polyethylene glycol 3350 17 Gram(s) Oral daily  traMADol 50 milliGRAM(s) Oral every 8 hours  vancomycin  IVPB 750 milliGRAM(s) IV Intermittent every 12 hours      PHYSICAL EXAM:  T(C): 36.2 (04-25-19 @ 15:21), Max: 37.1 (04-24-19 @ 17:00)  HR: 74 (04-25-19 @ 15:21) (69 - 95)  BP: 116/65 (04-25-19 @ 15:21) (91/48 - 128/72)  RR: 18 (04-25-19 @ 15:21) (18 - 18)  SpO2: 96% (04-25-19 @ 15:21) (93% - 96%)  Wt(kg): --  I&O's Summary    24 Apr 2019 07:01  -  25 Apr 2019 07:00  --------------------------------------------------------  IN: 800 mL / OUT: 1385 mL / NET: -585 mL    25 Apr 2019 07:01  -  25 Apr 2019 16:39  --------------------------------------------------------  IN: 1650 mL / OUT: 350 mL / NET: 1300 mL            JVP: Normal  Neck: supple  Lung: clear   CV: S1 S2 , Murmur:  Abd: soft  Ext: No edema  neuro: Awake / alert  Psych: flat affect  Skin: normal``    LABS/DATA:    CARDIAC MARKERS:                                7.2    6.4   )-----------( 198      ( 25 Apr 2019 05:08 )             21.8     04-25    137  |  104  |  14  ----------------------------<  103<H>  4.0   |  25  |  0.74    Ca    7.7<L>      25 Apr 2019 05:08      proBNP:   Lipid Profile:   HgA1c:   TSH:     TELE:  EKG:

## 2019-04-25 NOTE — PROGRESS NOTE ADULT - SUBJECTIVE AND OBJECTIVE BOX
Pt seen/examined. Doing well. Pain controlled. On 1-1 for agitation. No acute events overnight.    Vital Signs Last 24 Hrs  T(C): 36.4 (25 Apr 2019 05:19), Max: 37.1 (24 Apr 2019 17:00)  T(F): 97.6 (25 Apr 2019 05:19), Max: 98.7 (24 Apr 2019 17:00)  HR: 74 (25 Apr 2019 05:19) (71 - 83)  BP: 128/72 (25 Apr 2019 05:19) (110/62 - 128/72)  BP(mean): --  RR: 18 (25 Apr 2019 05:19) (18 - 18)  SpO2: 94% (25 Apr 2019 05:19) (93% - 96%)    - Gen: NAD, tired appearing but arousable  - RLE: Dressing C/D/I; SILT TN/SPN/DPN/SN; TA/EHL/gs intact, 2+ DP    73yFemale s/p R femur YARI, spacer POD 2:  - dressing changed  - Pain control  - DVT ppx  - OOB/PT  - FU labs  - FU medicine  - 1-1 till agitation decreasses

## 2019-04-26 LAB
ANION GAP SERPL CALC-SCNC: 6 MMOL/L — SIGNIFICANT CHANGE UP (ref 5–17)
BUN SERPL-MCNC: 16 MG/DL — SIGNIFICANT CHANGE UP (ref 7–23)
CALCIUM SERPL-MCNC: 8.4 MG/DL — SIGNIFICANT CHANGE UP (ref 8.4–10.5)
CHLORIDE SERPL-SCNC: 100 MMOL/L — SIGNIFICANT CHANGE UP (ref 96–108)
CO2 SERPL-SCNC: 27 MMOL/L — SIGNIFICANT CHANGE UP (ref 22–31)
CREAT SERPL-MCNC: 0.79 MG/DL — SIGNIFICANT CHANGE UP (ref 0.5–1.3)
CULTURE RESULTS: SIGNIFICANT CHANGE UP
CULTURE RESULTS: SIGNIFICANT CHANGE UP
GLUCOSE SERPL-MCNC: 101 MG/DL — HIGH (ref 70–99)
HCT VFR BLD CALC: 26.4 % — LOW (ref 34.5–45)
HGB BLD-MCNC: 8.5 G/DL — LOW (ref 11.5–15.5)
MCHC RBC-ENTMCNC: 28.8 PG — SIGNIFICANT CHANGE UP (ref 27–34)
MCHC RBC-ENTMCNC: 32.1 GM/DL — SIGNIFICANT CHANGE UP (ref 32–36)
MCV RBC AUTO: 89.6 FL — SIGNIFICANT CHANGE UP (ref 80–100)
PLATELET # BLD AUTO: 204 K/UL — SIGNIFICANT CHANGE UP (ref 150–400)
POTASSIUM SERPL-MCNC: 4.5 MMOL/L — SIGNIFICANT CHANGE UP (ref 3.5–5.3)
POTASSIUM SERPL-SCNC: 4.5 MMOL/L — SIGNIFICANT CHANGE UP (ref 3.5–5.3)
RBC # BLD: 2.95 M/UL — LOW (ref 3.8–5.2)
RBC # FLD: 15.3 % — HIGH (ref 10.3–14.5)
SODIUM SERPL-SCNC: 133 MMOL/L — LOW (ref 135–145)
SPECIMEN SOURCE: SIGNIFICANT CHANGE UP
SPECIMEN SOURCE: SIGNIFICANT CHANGE UP
WBC # BLD: 6.6 K/UL — SIGNIFICANT CHANGE UP (ref 3.8–10.5)
WBC # FLD AUTO: 6.6 K/UL — SIGNIFICANT CHANGE UP (ref 3.8–10.5)

## 2019-04-26 PROCEDURE — 71045 X-RAY EXAM CHEST 1 VIEW: CPT | Mod: 26

## 2019-04-26 PROCEDURE — 99233 SBSQ HOSP IP/OBS HIGH 50: CPT

## 2019-04-26 RX ORDER — SODIUM CHLORIDE 9 MG/ML
10 INJECTION INTRAMUSCULAR; INTRAVENOUS; SUBCUTANEOUS
Qty: 0 | Refills: 0 | Status: DISCONTINUED | OUTPATIENT
Start: 2019-04-26 | End: 2019-05-02

## 2019-04-26 RX ORDER — NICOTINE POLACRILEX 2 MG
1 GUM BUCCAL DAILY
Qty: 0 | Refills: 0 | Status: DISCONTINUED | OUTPATIENT
Start: 2019-04-26 | End: 2019-05-02

## 2019-04-26 RX ORDER — CHLORHEXIDINE GLUCONATE 213 G/1000ML
1 SOLUTION TOPICAL
Qty: 0 | Refills: 0 | Status: DISCONTINUED | OUTPATIENT
Start: 2019-04-26 | End: 2019-05-02

## 2019-04-26 RX ADMIN — PANTOPRAZOLE SODIUM 40 MILLIGRAM(S): 20 TABLET, DELAYED RELEASE ORAL at 05:46

## 2019-04-26 RX ADMIN — HYDROMORPHONE HYDROCHLORIDE 6 MILLIGRAM(S): 2 INJECTION INTRAMUSCULAR; INTRAVENOUS; SUBCUTANEOUS at 12:33

## 2019-04-26 RX ADMIN — Medication 1 TABLET(S): at 05:46

## 2019-04-26 RX ADMIN — Medication 1 TABLET(S): at 22:56

## 2019-04-26 RX ADMIN — HYDROMORPHONE HYDROCHLORIDE 6 MILLIGRAM(S): 2 INJECTION INTRAMUSCULAR; INTRAVENOUS; SUBCUTANEOUS at 04:15

## 2019-04-26 RX ADMIN — HYDROMORPHONE HYDROCHLORIDE 6 MILLIGRAM(S): 2 INJECTION INTRAMUSCULAR; INTRAVENOUS; SUBCUTANEOUS at 23:26

## 2019-04-26 RX ADMIN — HYDROMORPHONE HYDROCHLORIDE 6 MILLIGRAM(S): 2 INJECTION INTRAMUSCULAR; INTRAVENOUS; SUBCUTANEOUS at 13:00

## 2019-04-26 RX ADMIN — LISINOPRIL 10 MILLIGRAM(S): 2.5 TABLET ORAL at 05:46

## 2019-04-26 RX ADMIN — Medication 325 MILLIGRAM(S): at 05:46

## 2019-04-26 RX ADMIN — Medication 975 MILLIGRAM(S): at 14:32

## 2019-04-26 RX ADMIN — HYDROMORPHONE HYDROCHLORIDE 6 MILLIGRAM(S): 2 INJECTION INTRAMUSCULAR; INTRAVENOUS; SUBCUTANEOUS at 00:35

## 2019-04-26 RX ADMIN — Medication 100 MILLIGRAM(S): at 05:46

## 2019-04-26 RX ADMIN — Medication 1 MILLIGRAM(S): at 12:34

## 2019-04-26 RX ADMIN — Medication 975 MILLIGRAM(S): at 06:16

## 2019-04-26 RX ADMIN — Medication 1 PATCH: at 17:37

## 2019-04-26 RX ADMIN — Medication 325 MILLIGRAM(S): at 18:31

## 2019-04-26 RX ADMIN — MORPHINE SULFATE 15 MILLIGRAM(S): 50 CAPSULE, EXTENDED RELEASE ORAL at 21:57

## 2019-04-26 RX ADMIN — Medication 250 MILLIGRAM(S): at 05:46

## 2019-04-26 RX ADMIN — HYDROMORPHONE HYDROCHLORIDE 6 MILLIGRAM(S): 2 INJECTION INTRAMUSCULAR; INTRAVENOUS; SUBCUTANEOUS at 08:32

## 2019-04-26 RX ADMIN — TRAMADOL HYDROCHLORIDE 50 MILLIGRAM(S): 50 TABLET ORAL at 21:57

## 2019-04-26 RX ADMIN — MORPHINE SULFATE 15 MILLIGRAM(S): 50 CAPSULE, EXTENDED RELEASE ORAL at 15:00

## 2019-04-26 RX ADMIN — TRAMADOL HYDROCHLORIDE 50 MILLIGRAM(S): 50 TABLET ORAL at 14:35

## 2019-04-26 RX ADMIN — Medication 0.1 MILLIGRAM(S): at 17:38

## 2019-04-26 RX ADMIN — Medication 100 MILLIGRAM(S): at 21:57

## 2019-04-26 RX ADMIN — TRAMADOL HYDROCHLORIDE 50 MILLIGRAM(S): 50 TABLET ORAL at 05:46

## 2019-04-26 RX ADMIN — Medication 5 MILLIGRAM(S): at 09:07

## 2019-04-26 RX ADMIN — Medication 5 MILLIGRAM(S): at 17:37

## 2019-04-26 RX ADMIN — HYDROMORPHONE HYDROCHLORIDE 6 MILLIGRAM(S): 2 INJECTION INTRAMUSCULAR; INTRAVENOUS; SUBCUTANEOUS at 03:44

## 2019-04-26 RX ADMIN — MORPHINE SULFATE 15 MILLIGRAM(S): 50 CAPSULE, EXTENDED RELEASE ORAL at 22:27

## 2019-04-26 RX ADMIN — HYDROMORPHONE HYDROCHLORIDE 6 MILLIGRAM(S): 2 INJECTION INTRAMUSCULAR; INTRAVENOUS; SUBCUTANEOUS at 00:05

## 2019-04-26 RX ADMIN — Medication 1 TABLET(S): at 12:34

## 2019-04-26 RX ADMIN — HYDROMORPHONE HYDROCHLORIDE 6 MILLIGRAM(S): 2 INJECTION INTRAMUSCULAR; INTRAVENOUS; SUBCUTANEOUS at 09:00

## 2019-04-26 RX ADMIN — TRAMADOL HYDROCHLORIDE 50 MILLIGRAM(S): 50 TABLET ORAL at 15:30

## 2019-04-26 RX ADMIN — HYDROMORPHONE HYDROCHLORIDE 6 MILLIGRAM(S): 2 INJECTION INTRAMUSCULAR; INTRAVENOUS; SUBCUTANEOUS at 22:56

## 2019-04-26 RX ADMIN — Medication 0.1 MILLIGRAM(S): at 05:47

## 2019-04-26 RX ADMIN — Medication 975 MILLIGRAM(S): at 15:00

## 2019-04-26 RX ADMIN — POLYETHYLENE GLYCOL 3350 17 GRAM(S): 17 POWDER, FOR SOLUTION ORAL at 12:33

## 2019-04-26 RX ADMIN — TRAMADOL HYDROCHLORIDE 50 MILLIGRAM(S): 50 TABLET ORAL at 06:16

## 2019-04-26 RX ADMIN — Medication 5 MILLIGRAM(S): at 00:04

## 2019-04-26 RX ADMIN — Medication 975 MILLIGRAM(S): at 22:27

## 2019-04-26 RX ADMIN — Medication 1 TABLET(S): at 14:32

## 2019-04-26 RX ADMIN — HYDROMORPHONE HYDROCHLORIDE 6 MILLIGRAM(S): 2 INJECTION INTRAMUSCULAR; INTRAVENOUS; SUBCUTANEOUS at 18:31

## 2019-04-26 RX ADMIN — AMLODIPINE BESYLATE 10 MILLIGRAM(S): 2.5 TABLET ORAL at 05:46

## 2019-04-26 RX ADMIN — Medication 325 MILLIGRAM(S): at 12:34

## 2019-04-26 RX ADMIN — MORPHINE SULFATE 15 MILLIGRAM(S): 50 CAPSULE, EXTENDED RELEASE ORAL at 06:16

## 2019-04-26 RX ADMIN — MORPHINE SULFATE 15 MILLIGRAM(S): 50 CAPSULE, EXTENDED RELEASE ORAL at 05:46

## 2019-04-26 RX ADMIN — Medication 250 MILLIGRAM(S): at 17:38

## 2019-04-26 RX ADMIN — Medication 975 MILLIGRAM(S): at 05:46

## 2019-04-26 RX ADMIN — Medication 100 MILLIGRAM(S): at 14:33

## 2019-04-26 RX ADMIN — Medication 100 MILLIGRAM(S): at 14:31

## 2019-04-26 RX ADMIN — Medication 975 MILLIGRAM(S): at 21:57

## 2019-04-26 RX ADMIN — MORPHINE SULFATE 15 MILLIGRAM(S): 50 CAPSULE, EXTENDED RELEASE ORAL at 14:31

## 2019-04-26 NOTE — PROGRESS NOTE ADULT - ASSESSMENT
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 THR, kika-prosthetic R femur fx (s/p Total Hip revision with ORIF, 6/30/18), recent re-admission (07/16 - 07/24/18) for worsening R hip pain and decreased ability to ambulate now s/p R femur [place] B1 periprosthetic fracture ORIF (07/19/18) admitted 8/2018 with MRSA bacteremia and hardware collection s/p washout but the hardware was not removed, OR cxs also with MRSA she completed a course of vanco and then bactrim for suppressive therapy but again  presented with hip erythema and edema again the hardware was not removed in hope for femur union, and she received another course of vanco and then doxy and symptoms started after she finished the antibiotics and came back with fever, chills, erythema edema and fluctuation on the hip incision  here afebrile WBC normal  CT with lucency, fractures and cellulitis  s/p hardware removal 4/23, purulence was found along the entire lateral aspect of R leg with loose hardware, pacer palced      hardware infection previously MRSA bacteremia and prosthetic joint infection with cellulitis and ?abscess  s/p hardware removal, purulence found along the entire lateral aspect of the R leg with loose hardware, spacer placement 2/23  OR cxs MRSA as expected    * c/w vanco 750 q 12  * check the trough tomorrow  * can place a PICC line  * will need  at least 6 weeks of antibiotics

## 2019-04-26 NOTE — PROGRESS NOTE ADULT - SUBJECTIVE AND OBJECTIVE BOX
Patient seen and examined. Pain controlled. No acute events overnight    HEALTH ISSUES - PROBLEM Dx:  LAI (acute kidney injury): LAI (acute kidney injury)  Prophylactic measure: Prophylactic measure  Chronic obstructive pulmonary disease, unspecified COPD type: Chronic obstructive pulmonary disease, unspecified COPD type  Alcohol abuse: Alcohol abuse  Coronary artery disease involving native coronary artery of native heart without angina pectoris: Coronary artery disease involving native coronary artery of native heart without angina pectoris  Aortic valve stenosis, etiology of cardiac valve disease unspecified: Aortic valve stenosis, etiology of cardiac valve disease unspecified  Shortness of breath: Shortness of breath  Pyogenic arthritis of right hip, due to unspecified organism: Pyogenic arthritis of right hip, due to unspecified organism        MEDICATIONS  (STANDING):  acetaminophen   Tablet .. 975 milliGRAM(s) Oral every 8 hours  amLODIPine   Tablet 10 milliGRAM(s) Oral daily  aspirin enteric coated 325 milliGRAM(s) Oral two times a day  calcium carbonate 1250 mG  + Vitamin D (OsCal 500 + D) 1 Tablet(s) Oral three times a day  cloNIDine 0.1 milliGRAM(s) Oral two times a day  docusate sodium 100 milliGRAM(s) Oral three times a day  ferrous    sulfate 325 milliGRAM(s) Oral daily  folic acid 1 milliGRAM(s) Oral daily  labetalol 100 milliGRAM(s) Oral three times a day  lactated ringers. 1000 milliLiter(s) IV Continuous <Continuous>  lisinopril 10 milliGRAM(s) Oral daily  morphine ER Tablet 15 milliGRAM(s) Oral every 8 hours  multivitamin 1 Tablet(s) Oral daily  pantoprazole    Tablet 40 milliGRAM(s) Oral before breakfast  polyethylene glycol 3350 17 Gram(s) Oral daily  traMADol 50 milliGRAM(s) Oral every 8 hours  vancomycin  IVPB 750 milliGRAM(s) IV Intermittent every 12 hours    Allergies    No Known Allergies    Intolerances                            8.4    7.9   )-----------( 190      ( 25 Apr 2019 17:03 )             24.7     25 Apr 2019 05:08    137    |  104    |  14     ----------------------------<  103    4.0     |  25     |  0.74     Ca    7.7        25 Apr 2019 05:08        Vital Signs Last 24 Hrs  T(C): 36.8 (04-26-19 @ 05:07), Max: 37 (04-25-19 @ 12:05)  T(F): 98.2 (04-26-19 @ 05:07), Max: 98.6 (04-25-19 @ 12:05)  HR: 80 (04-26-19 @ 05:07) (67 - 95)  BP: 135/68 (04-26-19 @ 05:07) (91/48 - 183/77)  BP(mean): --  RR: 48 (04-26-19 @ 05:07) (18 - 48)  SpO2: 96% (04-26-19 @ 05:07) (94% - 96%)    Physical Exam  Gen: NAD  RLE:  Dressing c/d/i  +ehl/fhl/ta/gs function  L2-S1 silt  Dp/pt pulse intact  No calf ttp  Compartments soft    A/P: 73y Female sp R hip YARI, I&D and abx spacer POD#3  Pain control  DVT ppx  NWB RLE  cont abx  FU labs  Ice prn  Medical management appreciated  Incentive spirometry  Dispo planning

## 2019-04-26 NOTE — PROGRESS NOTE ADULT - SUBJECTIVE AND OBJECTIVE BOX
Subjective: Patient seen and examined. No new events except as noted.     SUBJECTIVE/ROS:  feels ok       MEDICATIONS:  MEDICATIONS  (STANDING):  acetaminophen   Tablet .. 975 milliGRAM(s) Oral every 8 hours  amLODIPine   Tablet 10 milliGRAM(s) Oral daily  aspirin enteric coated 325 milliGRAM(s) Oral two times a day  calcium carbonate 1250 mG  + Vitamin D (OsCal 500 + D) 1 Tablet(s) Oral three times a day  cloNIDine 0.1 milliGRAM(s) Oral two times a day  docusate sodium 100 milliGRAM(s) Oral three times a day  ferrous    sulfate 325 milliGRAM(s) Oral daily  folic acid 1 milliGRAM(s) Oral daily  labetalol 100 milliGRAM(s) Oral three times a day  lactated ringers. 1000 milliLiter(s) (75 mL/Hr) IV Continuous <Continuous>  lisinopril 10 milliGRAM(s) Oral daily  morphine ER Tablet 15 milliGRAM(s) Oral every 8 hours  multivitamin 1 Tablet(s) Oral daily  pantoprazole    Tablet 40 milliGRAM(s) Oral before breakfast  polyethylene glycol 3350 17 Gram(s) Oral daily  traMADol 50 milliGRAM(s) Oral every 8 hours  vancomycin  IVPB 750 milliGRAM(s) IV Intermittent every 12 hours      PHYSICAL EXAM:  T(C): 36.8 (04-26-19 @ 05:07), Max: 37 (04-25-19 @ 12:05)  HR: 80 (04-26-19 @ 05:07) (67 - 95)  BP: 135/68 (04-26-19 @ 05:07) (91/48 - 183/77)  RR: 48 (04-26-19 @ 05:07) (18 - 48)  SpO2: 96% (04-26-19 @ 05:07) (94% - 96%)  Wt(kg): --  I&O's Summary    25 Apr 2019 07:01  -  26 Apr 2019 07:00  --------------------------------------------------------  IN: 2460 mL / OUT: 2250 mL / NET: 210 mL            JVP: Normal  Neck: supple  Lung: clear   CV: S1 S2 , Murmur:  Abd: soft  Ext: No edema  neuro: Awake / alert  Psych: flat affect  Skin: normal``    LABS/DATA:    CARDIAC MARKERS:                                8.5    6.6   )-----------( 204      ( 26 Apr 2019 06:40 )             26.4     04-26    133<L>  |  100  |  16  ----------------------------<  101<H>  4.5   |  27  |  0.79    Ca    8.4      26 Apr 2019 06:40      proBNP:   Lipid Profile:   HgA1c:   TSH:     TELE:  EKG:

## 2019-04-26 NOTE — PROGRESS NOTE ADULT - ASSESSMENT
HTN  cont current meds  much better controlled     CAD history of stent  cont asa    AS  stable  Moderate   stable     Anemia  Monitor hemoglobin, transfuse as needed.    DVT proph  on asa bid

## 2019-04-26 NOTE — CHART NOTE - NSCHARTNOTEFT_GEN_A_CORE
Ortho PA Note      As per Dr. Zapata in radiology () RUE PICC in SVC, in good position.      LELAND Lundberg  Orthopedic Surgery  [phone]

## 2019-04-26 NOTE — PROGRESS NOTE ADULT - SUBJECTIVE AND OBJECTIVE BOX
Follow Up:  hardware infection with prosthetic joint infection, cellulitis    Interval History: pt stable and afebrile, the OR cultures with staph aureus    ROS:      All other systems negative    Constitutional: no fever, chills  Eye: no eye pain, no redness, no vision changes  ENT:  no sore throat, no rhinorrhea  Cardiovascular:  no chest pain, no palpitation  Respiratory:  no SOB, no cough  GI:  no abd pain, no vomiting, no diarrhea  urinary: no dysuria, no hematuria, no flank pain  : no  discharge or bleeding  musculoskeletal:  R hip pain  skin:  no rash  neurology:  no headache, no seizure, no change in mental status  psych: episodes of agitation          Allergies  No Known Allergies        ANTIMICROBIALS:  vancomycin  IVPB 750 every 12 hours      OTHER MEDS:  acetaminophen   Tablet .. 975 milliGRAM(s) Oral every 8 hours  ALBUTerol/ipratropium for Nebulization 3 milliLiter(s) Nebulizer every 6 hours PRN  aluminum hydroxide/magnesium hydroxide/simethicone Suspension 30 milliLiter(s) Oral four times a day PRN  amLODIPine   Tablet 10 milliGRAM(s) Oral daily  aspirin enteric coated 325 milliGRAM(s) Oral two times a day  bisacodyl Suppository 10 milliGRAM(s) Rectal daily PRN  calcium carbonate 1250 mG  + Vitamin D (OsCal 500 + D) 1 Tablet(s) Oral three times a day  cloNIDine 0.1 milliGRAM(s) Oral two times a day  diazepam    Tablet 5 milliGRAM(s) Oral every 8 hours PRN  docusate sodium 100 milliGRAM(s) Oral three times a day  ferrous    sulfate 325 milliGRAM(s) Oral daily  folic acid 1 milliGRAM(s) Oral daily  HYDROmorphone   Tablet 4 milliGRAM(s) Oral every 4 hours PRN  HYDROmorphone   Tablet 6 milliGRAM(s) Oral every 4 hours PRN  HYDROmorphone  Injectable 1 milliGRAM(s) IV Push every 3 hours PRN  labetalol 100 milliGRAM(s) Oral three times a day  lactated ringers. 1000 milliLiter(s) IV Continuous <Continuous>  lisinopril 10 milliGRAM(s) Oral daily  magnesium hydroxide Suspension 30 milliLiter(s) Oral daily PRN  melatonin 3 milliGRAM(s) Oral at bedtime PRN  morphine ER Tablet 15 milliGRAM(s) Oral every 8 hours  multivitamin 1 Tablet(s) Oral daily  ondansetron Injectable 4 milliGRAM(s) IV Push every 6 hours PRN  pantoprazole    Tablet 40 milliGRAM(s) Oral before breakfast  polyethylene glycol 3350 17 Gram(s) Oral daily  senna 2 Tablet(s) Oral at bedtime PRN  traMADol 50 milliGRAM(s) Oral every 8 hours      Vital Signs Last 24 Hrs  T(C): 36.6 (26 Apr 2019 09:25), Max: 36.9 (25 Apr 2019 14:07)  T(F): 97.8 (26 Apr 2019 09:25), Max: 98.4 (25 Apr 2019 14:07)  HR: 67 (26 Apr 2019 09:25) (67 - 80)  BP: 113/67 (26 Apr 2019 09:25) (113/67 - 183/77)  BP(mean): --  RR: 18 (26 Apr 2019 09:25) (18 - 48)  SpO2: 96% (26 Apr 2019 09:25) (94% - 96%)    Physical Exam:  General:    NAD,  non toxic  Head: atraumatic, normocephalic  Eye: normal sclera and conjunctiva  ENT:    no oropharyngeal lesions,   no LAD,   neck supple  Cardio:     regular S1, S2,  no murmur  Respiratory:    clear b/l,    no wheezing  abd:     soft,   BS +,   no tenderness,    no organomegaly  :   no CVAT,  no suprapubic tenderness,  + edmondson  Musculoskeletal: R hip and thigh with clean dressing  vascular: no phlebitis, normal pulses  Skin:    no rash  Neurologic:     no focal deficit  psych: now calm but on 1:1 for agitation                        8.5    6.6   )-----------( 204      ( 26 Apr 2019 06:40 )             26.4       04-26    133<L>  |  100  |  16  ----------------------------<  101<H>  4.5   |  27  |  0.79    Ca    8.4      26 Apr 2019 06:40            MICROBIOLOGY:  v  .Tissue Right Hip Skin  04-24-19   Rare Methicillin resistant Staphylococcus aureus  --  Methicillin resistant Staphylococcus aureus      .Surgical Swab  04-24-19   Few Methicillin resistant Staphylococcus aureus  --  Methicillin resistant Staphylococcus aureus      .Blood  04-22-19   No growth to date.  --  --      .Blood  04-21-19   No growth to date.  --  --                RADIOLOGY:  Images below reviewed personally  < from: Xray Pelvis AP only (04.23.19 @ 19:30) >    IMPRESSION: Postsurgical changes status post removal of hardware from the   proximal right femur, including removal of right hip arthroplasty and   lateral plate and screws, as well as cerclage wires. Redemonstrated   extensive heterotopic ossification about the proximal femur.   Redemonstrated lucency extending through the medial aspect of the   proximal femoral shaft consistent with fracture. Multiple lucent hardware   tracts in the femur. Lucent defect in the cortex of the lateral aspect of   the proximal femoral shaft, question related to prior hardware or old   trauma. Status post placement of spacer in the right hip. Opacities   projecting in the soft tissues at the lateralaspect of the proximal   femoral shaft and tubular opacity extending over the proximal thigh and   inguinal region, question external to patient or Edmondson catheter;   correlate clinically. Vascular calcifications. Postsurgical changes in   the soft tissues.

## 2019-04-27 ENCOUNTER — TRANSCRIPTION ENCOUNTER (OUTPATIENT)
Age: 74
End: 2019-04-27

## 2019-04-27 DIAGNOSIS — T84.51XA INFECTION AND INFLAMMATORY REACTION DUE TO INTERNAL RIGHT HIP PROSTHESIS, INITIAL ENCOUNTER: ICD-10-CM

## 2019-04-27 LAB
CULTURE RESULTS: SIGNIFICANT CHANGE UP
SPECIMEN SOURCE: SIGNIFICANT CHANGE UP
VANCOMYCIN TROUGH SERPL-MCNC: 13.3 UG/ML — SIGNIFICANT CHANGE UP (ref 10–20)

## 2019-04-27 RX ADMIN — PANTOPRAZOLE SODIUM 40 MILLIGRAM(S): 20 TABLET, DELAYED RELEASE ORAL at 04:34

## 2019-04-27 RX ADMIN — Medication 325 MILLIGRAM(S): at 12:57

## 2019-04-27 RX ADMIN — TRAMADOL HYDROCHLORIDE 50 MILLIGRAM(S): 50 TABLET ORAL at 04:42

## 2019-04-27 RX ADMIN — Medication 1 PATCH: at 08:44

## 2019-04-27 RX ADMIN — MORPHINE SULFATE 15 MILLIGRAM(S): 50 CAPSULE, EXTENDED RELEASE ORAL at 14:25

## 2019-04-27 RX ADMIN — Medication 3 MILLIGRAM(S): at 22:06

## 2019-04-27 RX ADMIN — Medication 325 MILLIGRAM(S): at 18:11

## 2019-04-27 RX ADMIN — Medication 1 PATCH: at 20:00

## 2019-04-27 RX ADMIN — Medication 325 MILLIGRAM(S): at 04:36

## 2019-04-27 RX ADMIN — LISINOPRIL 10 MILLIGRAM(S): 2.5 TABLET ORAL at 04:33

## 2019-04-27 RX ADMIN — HYDROMORPHONE HYDROCHLORIDE 6 MILLIGRAM(S): 2 INJECTION INTRAMUSCULAR; INTRAVENOUS; SUBCUTANEOUS at 18:39

## 2019-04-27 RX ADMIN — Medication 100 MILLIGRAM(S): at 22:05

## 2019-04-27 RX ADMIN — Medication 1 PATCH: at 12:57

## 2019-04-27 RX ADMIN — Medication 250 MILLIGRAM(S): at 08:45

## 2019-04-27 RX ADMIN — Medication 975 MILLIGRAM(S): at 15:05

## 2019-04-27 RX ADMIN — HYDROMORPHONE HYDROCHLORIDE 6 MILLIGRAM(S): 2 INJECTION INTRAMUSCULAR; INTRAVENOUS; SUBCUTANEOUS at 13:53

## 2019-04-27 RX ADMIN — Medication 1 TABLET(S): at 12:56

## 2019-04-27 RX ADMIN — AMLODIPINE BESYLATE 10 MILLIGRAM(S): 2.5 TABLET ORAL at 04:34

## 2019-04-27 RX ADMIN — Medication 975 MILLIGRAM(S): at 22:05

## 2019-04-27 RX ADMIN — HYDROMORPHONE HYDROCHLORIDE 6 MILLIGRAM(S): 2 INJECTION INTRAMUSCULAR; INTRAVENOUS; SUBCUTANEOUS at 09:37

## 2019-04-27 RX ADMIN — HYDROMORPHONE HYDROCHLORIDE 6 MILLIGRAM(S): 2 INJECTION INTRAMUSCULAR; INTRAVENOUS; SUBCUTANEOUS at 14:10

## 2019-04-27 RX ADMIN — HYDROMORPHONE HYDROCHLORIDE 6 MILLIGRAM(S): 2 INJECTION INTRAMUSCULAR; INTRAVENOUS; SUBCUTANEOUS at 04:59

## 2019-04-27 RX ADMIN — Medication 975 MILLIGRAM(S): at 04:33

## 2019-04-27 RX ADMIN — CHLORHEXIDINE GLUCONATE 1 APPLICATION(S): 213 SOLUTION TOPICAL at 04:41

## 2019-04-27 RX ADMIN — TRAMADOL HYDROCHLORIDE 50 MILLIGRAM(S): 50 TABLET ORAL at 22:35

## 2019-04-27 RX ADMIN — Medication 1 TABLET(S): at 04:34

## 2019-04-27 RX ADMIN — Medication 1 TABLET(S): at 14:28

## 2019-04-27 RX ADMIN — Medication 975 MILLIGRAM(S): at 05:03

## 2019-04-27 RX ADMIN — Medication 0.1 MILLIGRAM(S): at 04:34

## 2019-04-27 RX ADMIN — Medication 0.1 MILLIGRAM(S): at 18:11

## 2019-04-27 RX ADMIN — TRAMADOL HYDROCHLORIDE 50 MILLIGRAM(S): 50 TABLET ORAL at 22:05

## 2019-04-27 RX ADMIN — HYDROMORPHONE HYDROCHLORIDE 6 MILLIGRAM(S): 2 INJECTION INTRAMUSCULAR; INTRAVENOUS; SUBCUTANEOUS at 04:29

## 2019-04-27 RX ADMIN — HYDROMORPHONE HYDROCHLORIDE 6 MILLIGRAM(S): 2 INJECTION INTRAMUSCULAR; INTRAVENOUS; SUBCUTANEOUS at 09:12

## 2019-04-27 RX ADMIN — MORPHINE SULFATE 15 MILLIGRAM(S): 50 CAPSULE, EXTENDED RELEASE ORAL at 04:36

## 2019-04-27 RX ADMIN — MORPHINE SULFATE 15 MILLIGRAM(S): 50 CAPSULE, EXTENDED RELEASE ORAL at 05:06

## 2019-04-27 RX ADMIN — MORPHINE SULFATE 15 MILLIGRAM(S): 50 CAPSULE, EXTENDED RELEASE ORAL at 22:05

## 2019-04-27 RX ADMIN — Medication 250 MILLIGRAM(S): at 22:05

## 2019-04-27 RX ADMIN — Medication 1 MILLIGRAM(S): at 12:56

## 2019-04-27 RX ADMIN — MORPHINE SULFATE 15 MILLIGRAM(S): 50 CAPSULE, EXTENDED RELEASE ORAL at 15:05

## 2019-04-27 RX ADMIN — Medication 1 PATCH: at 06:56

## 2019-04-27 RX ADMIN — Medication 1 TABLET(S): at 22:05

## 2019-04-27 RX ADMIN — Medication 1 PATCH: at 12:54

## 2019-04-27 RX ADMIN — TRAMADOL HYDROCHLORIDE 50 MILLIGRAM(S): 50 TABLET ORAL at 14:25

## 2019-04-27 RX ADMIN — Medication 100 MILLIGRAM(S): at 04:34

## 2019-04-27 RX ADMIN — Medication 975 MILLIGRAM(S): at 22:35

## 2019-04-27 RX ADMIN — POLYETHYLENE GLYCOL 3350 17 GRAM(S): 17 POWDER, FOR SOLUTION ORAL at 12:56

## 2019-04-27 RX ADMIN — Medication 975 MILLIGRAM(S): at 14:26

## 2019-04-27 RX ADMIN — MORPHINE SULFATE 15 MILLIGRAM(S): 50 CAPSULE, EXTENDED RELEASE ORAL at 22:35

## 2019-04-27 RX ADMIN — TRAMADOL HYDROCHLORIDE 50 MILLIGRAM(S): 50 TABLET ORAL at 15:05

## 2019-04-27 RX ADMIN — Medication 100 MILLIGRAM(S): at 04:33

## 2019-04-27 RX ADMIN — HYDROMORPHONE HYDROCHLORIDE 6 MILLIGRAM(S): 2 INJECTION INTRAMUSCULAR; INTRAVENOUS; SUBCUTANEOUS at 18:11

## 2019-04-27 NOTE — DISCHARGE NOTE PROVIDER - CARE PROVIDERS DIRECT ADDRESSES
[email or web address]

## 2019-04-27 NOTE — PROGRESS NOTE ADULT - SUBJECTIVE AND OBJECTIVE BOX
Subjective: Patient seen and examined. No new events except as noted.     SUBJECTIVE/ROS:    no cp or sob     MEDICATIONS:  MEDICATIONS  (STANDING):  acetaminophen   Tablet .. 975 milliGRAM(s) Oral every 8 hours  amLODIPine   Tablet 10 milliGRAM(s) Oral daily  aspirin enteric coated 325 milliGRAM(s) Oral two times a day  calcium carbonate 1250 mG  + Vitamin D (OsCal 500 + D) 1 Tablet(s) Oral three times a day  chlorhexidine 4% Liquid 1 Application(s) Topical <User Schedule>  cloNIDine 0.1 milliGRAM(s) Oral two times a day  docusate sodium 100 milliGRAM(s) Oral three times a day  ferrous    sulfate 325 milliGRAM(s) Oral daily  folic acid 1 milliGRAM(s) Oral daily  labetalol 100 milliGRAM(s) Oral three times a day  lactated ringers. 1000 milliLiter(s) (75 mL/Hr) IV Continuous <Continuous>  lisinopril 10 milliGRAM(s) Oral daily  morphine ER Tablet 15 milliGRAM(s) Oral every 8 hours  multivitamin 1 Tablet(s) Oral daily  nicotine -  14 mG/24Hr(s) Patch 1 patch Transdermal daily  pantoprazole    Tablet 40 milliGRAM(s) Oral before breakfast  polyethylene glycol 3350 17 Gram(s) Oral daily  traMADol 50 milliGRAM(s) Oral every 8 hours  vancomycin  IVPB 750 milliGRAM(s) IV Intermittent every 12 hours      PHYSICAL EXAM:  T(C): 36.8 (04-27-19 @ 09:30), Max: 37 (04-26-19 @ 22:47)  HR: 65 (04-27-19 @ 09:30) (65 - 72)  BP: 106/57 (04-27-19 @ 09:30) (106/57 - 157/65)  RR: 18 (04-27-19 @ 09:30) (18 - 18)  SpO2: 95% (04-27-19 @ 09:30) (95% - 96%)  Wt(kg): --  I&O's Summary    26 Apr 2019 07:01  -  27 Apr 2019 07:00  --------------------------------------------------------  IN: 1400 mL / OUT: 3300 mL / NET: -1900 mL    27 Apr 2019 07:01  -  27 Apr 2019 10:41  --------------------------------------------------------  IN: 480 mL / OUT: 0 mL / NET: 480 mL            JVP: Normal  Neck: supple  Lung: clear   CV: S1 S2 , Murmur:  Abd: soft  Ext: No edema  neuro: Awake / alert  Psych: flat affect  Skin: normal``    LABS/DATA:    CARDIAC MARKERS:                                8.5    6.6   )-----------( 204      ( 26 Apr 2019 06:40 )             26.4     04-26    133<L>  |  100  |  16  ----------------------------<  101<H>  4.5   |  27  |  0.79    Ca    8.4      26 Apr 2019 06:40      proBNP:   Lipid Profile:   HgA1c:   TSH:     TELE:  EKG:

## 2019-04-27 NOTE — DISCHARGE NOTE PROVIDER - NSDCFUADDINST_GEN_ALL_CORE_FT
maintain non-weight bearing on right lower extremity with rolling walker. Keep surgical incisions/dressings clean and dry. Continue posterior total hip precautions. Have dressing/sutures/staples removed and steri-strips applied post operative day #14(5/7/19). maintain non-weight bearing on right lower extremity with rolling walker with posterior hip precautions. Keep surgical incisions/dressings clean and dry. Continue posterior total hip precautions. Have dressing/sutures/staples removed and steri-strips applied post operative day #14(5/7/19).  Please have weekly CBC w/ dif, Vancomycin trough, CMP, ESR, CRP faxed to Dr. Waters while on antibiotics.  Please continue antibiotics until 6/4/19 per Infectious Disease instructions.  Please see your PMD within 1 month of hospital discharge for routine follow up.  Continue aspirin 325 twice daily x 6 weeks total for dvt prevention

## 2019-04-27 NOTE — PROGRESS NOTE ADULT - ASSESSMENT
S/P YARI R Hip, I&D with antibiotic spacer    Plan    PICC'd  Vanco  ck vanco trough  OOB/PT NWB RLE  D/C planning       Vee Cruz PA-C   Beeper    [phone]

## 2019-04-27 NOTE — PROGRESS NOTE ADULT - SUBJECTIVE AND OBJECTIVE BOX
Patient is a 73y old  Female who presents with a chief complaint of R hip/femur infected hardware (26 Apr 2019 13:01)      POST OPERATIVE DAY #: 4  Patient states she is in severe pain.  Just woke up and appears comfortable.  Will review pain meds       VS:  T(C): 36.7 (04-27-19 @ 05:28), Max: 37 (04-26-19 @ 22:47)  T(F): 98 (04-27-19 @ 05:28), Max: 98.6 (04-26-19 @ 22:47)  HR: 71 (04-27-19 @ 05:28) (67 - 72)  BP: 157/65 (04-27-19 @ 05:28) (113/67 - 157/65)  RR: 18 (04-27-19 @ 05:28) (18 - 18)  SpO2: 96% (04-27-19 @ 05:28) (96% - 96%)  Wt(kg): --      PHYSICAL EXAM:  NAD, Alert  EXT:      Rt Hip  Aquacel Dressing C/D/I  No Calf Tenderness  (+) DF/PF; (+) Distal Pulses;  Sensation: No gross deficits noted  Pulses 2+   B/L, PAS     LABS:                        8.5<L>  6.6   )-----------( 204      ( 26 Apr 2019 06:40 )             26.4<L>    04-26    133<L>  |  100  |  16  ----------------------------<  101<H>  4.5   |  27  |  0.79

## 2019-04-27 NOTE — PROGRESS NOTE ADULT - ASSESSMENT
HTN  stable     CAD history of stent  cont asa    AS  stable  Moderate   stable     Anemia  Monitor hemoglobin, transfuse as needed.    DVT proph  on asa bid

## 2019-04-27 NOTE — DISCHARGE NOTE PROVIDER - NSDCACTIVITY_GEN_ALL_CORE
Do not make important decisions/Do not drive or operate machinery/Walking - Indoors allowed/Walking - Outdoors allowed Do not drive or operate machinery/No heavy lifting/straining/Stairs allowed/Walking - Outdoors allowed/Showering allowed/Do not make important decisions/Walking - Indoors allowed

## 2019-04-27 NOTE — DISCHARGE NOTE PROVIDER - CARE PROVIDER_API CALL
Megan Be)  Orthopaedic Surgery  [address] [facility], [address] [place] [id number]  Phone: [phone]  Fax: [phone]  Follow Up Time:     Bethany Waters)  Internal Medicine  [address] [place] [address], [place] [address]  Phone: [phone]  Fax: [phone]  Follow Up Time:

## 2019-04-27 NOTE — DISCHARGE NOTE PROVIDER - HOSPITAL COURSE
Reason for Admission: R hip/femur infected hardware    History of Present Illness:     HPI: 73y Female w/ hx of seizure d/o, EtOH abuse, active 1 ppd smoker, CAD s/p stents (remote), HTN, HLD, R FN fx s/p CRPP (2017, [facility]) c/b AVN s/p R USAMA (Dr. Jaime, 5/18) c/b vanc B2 PP Fx s/p ORIF/rev USAMA (Dr. Be 6/18), c/b recurrent PP fx s/p ORIF (Dr. Cornell 7/18), c/b MRSA infection s/p I&D (Dr. Be, 8/18) s/p extended course of IV abx via PICC and further suppressive PO abx. Patient finished course of PO abx late in 2018. She was seen in the office by Dr. Be in 2/19 and had hip pain at that time, so she says she was started on another course of PO doxy, which she finished mid-March, and she has noticed worsening of her pain since then. Today she presents c/o worsening R leg pain to the point that she can no longer ambulate. Denies trauma/HS/LOC. Denies numbness/tingling. Subjective fever and chills over past month per patient, but she is afebrile in the ED. Denies pain/injury elsewhere.         PAST MEDICAL & SURGICAL HISTORY:    Pain of right hip joint    Migraine    Alcohol abuse    Seizure    Stented coronary artery    Coronary artery disease    Anxiety    HTN (hypertension)    Emphysema, unspecified    Anxiety    Migraine    CAD (coronary artery disease)    Hyperlipidemia    Hypertension    Status post total hip replacement, right: 5/21/18    S/P bladder repair        HOSPITAL COURSE;    72 y/o FM  underwent removal of hardware, I&D, antibiotic spacer insertion of right total hip replacement infection  on 4/23/19 with Dr. Be.  Patient tolerated procedure well.  Patient was evaluated postoperatively by physical and occupational  therapists for non-weight bearing ambulation on right lower extremity and _.  Patient was evaluated by infectious disease Dr Waters perioperatively. Patient had PICC line inserted for long term IV antibiotic  administration on 4/26/19. Patient advised to keep surgical incision/dressing clean and dry, and have dressing and surgical staples removed and steri strips applied post op day #14.(5/7/19)  Patient further advised to follow up with  _ in _. Reason for Admission: R hip/femur infected hardware    History of Present Illness:     HPI: 73y Female w/ hx of seizure d/o, EtOH abuse, active 1 ppd smoker, CAD s/p stents (remote), HTN, HLD, R FN fx s/p CRPP (2017, [facility]) c/b AVN s/p R USAMA (Dr. Jaime, 5/18) c/b vanc B2 PP Fx s/p ORIF/rev USAMA (Dr. Be 6/18), c/b recurrent PP fx s/p ORIF (Dr. Cornell 7/18), c/b MRSA infection s/p I&D (Dr. Be, 8/18) s/p extended course of IV abx via PICC and further suppressive PO abx. Patient finished course of PO abx late in 2018. She was seen in the office by Dr. Be in 2/19 and had hip pain at that time, so she says she was started on another course of PO doxy, which she finished mid-March, and she has noticed worsening of her pain since then. Today she presents c/o worsening R leg pain to the point that she can no longer ambulate. Denies trauma/HS/LOC. Denies numbness/tingling. Subjective fever and chills over past month per patient, but she is afebrile in the ED. Denies pain/injury elsewhere.         PAST MEDICAL & SURGICAL HISTORY:    Pain of right hip joint    Migraine    Alcohol abuse    Seizure    Stented coronary artery    Coronary artery disease    Anxiety    HTN (hypertension)    Emphysema, unspecified    Anxiety    Migraine    CAD (coronary artery disease)    Hyperlipidemia    Hypertension    Status post total hip replacement, right: 5/21/18    S/P bladder repair        HOSPITAL COURSE;    72 y/o FM  underwent removal of hardware, I&D, antibiotic spacer insertion of right total hip replacement infection  on 4/23/19 with Dr. Be.  Patient tolerated procedure well.  Patient was evaluated postoperatively by physical and occupational  therapists for non-weight bearing ambulation on right lower extremity with posterior hip precautions.  Patient was evaluated by infectious disease Dr Waters perioperatively. Patient had PICC line inserted for long term IV antibiotic  administration on 4/26/19. Patient advised to keep surgical incision/dressing clean and dry, and have dressing and surgical staples removed and steri strips applied post op day #14.(5/7/19)  Patient further advised to follow up with Infectious Disease on discharge from subacute rehab.        Culture - Tissue with Gram Stain (04.24.19 @ 02:01)      -  Gentamicin: S <=1 Should not be used as monotherapy      -  Linezolid: S 4      -  Oxacillin: R >2      -  Penicillin: R 1      -  RIF- Rifampin: S <=1 Should not be used as monotherapy      -  Tetra/Doxy: S <=1      -  Trimethoprim/Sulfamethoxazole: S <=0.5/9.5      -  Vancomycin: S 2      -  Ampicillin/Sulbactam: R <=8/4      Gram Stain:     No polymorphonuclear cells seen per low power field    No organisms seen per oil power field      -  Cefazolin: R 8      -  Clindamycin: S 0.5      -  Daptomycin: S 1      -  Erythromycin: S <=0.25      Specimen Source: .Tissue Right Hip Skin      Culture Results:     Rare Methicillin resistant Staphylococcus aureus      Organism Identification: Methicillin resistant Staphylococcus aureus      Organism: Methicillin resistant Staphylococcus aureus      Method Type: DEVAN

## 2019-04-27 NOTE — DISCHARGE NOTE PROVIDER - NSDCCPCAREPLAN_GEN_ALL_CORE_FT
PRINCIPAL DISCHARGE DIAGNOSIS  Diagnosis: Infection associated with internal right hip prosthesis, initial encounter  Assessment and Plan of Treatment:

## 2019-04-28 LAB
CULTURE RESULTS: SIGNIFICANT CHANGE UP
ORGANISM # SPEC MICROSCOPIC CNT: SIGNIFICANT CHANGE UP
SPECIMEN SOURCE: SIGNIFICANT CHANGE UP

## 2019-04-28 RX ADMIN — HYDROMORPHONE HYDROCHLORIDE 6 MILLIGRAM(S): 2 INJECTION INTRAMUSCULAR; INTRAVENOUS; SUBCUTANEOUS at 20:25

## 2019-04-28 RX ADMIN — Medication 975 MILLIGRAM(S): at 14:43

## 2019-04-28 RX ADMIN — MORPHINE SULFATE 15 MILLIGRAM(S): 50 CAPSULE, EXTENDED RELEASE ORAL at 13:42

## 2019-04-28 RX ADMIN — Medication 975 MILLIGRAM(S): at 23:10

## 2019-04-28 RX ADMIN — TRAMADOL HYDROCHLORIDE 50 MILLIGRAM(S): 50 TABLET ORAL at 13:42

## 2019-04-28 RX ADMIN — Medication 975 MILLIGRAM(S): at 06:34

## 2019-04-28 RX ADMIN — CHLORHEXIDINE GLUCONATE 1 APPLICATION(S): 213 SOLUTION TOPICAL at 06:06

## 2019-04-28 RX ADMIN — MORPHINE SULFATE 15 MILLIGRAM(S): 50 CAPSULE, EXTENDED RELEASE ORAL at 06:04

## 2019-04-28 RX ADMIN — HYDROMORPHONE HYDROCHLORIDE 6 MILLIGRAM(S): 2 INJECTION INTRAMUSCULAR; INTRAVENOUS; SUBCUTANEOUS at 10:30

## 2019-04-28 RX ADMIN — TRAMADOL HYDROCHLORIDE 50 MILLIGRAM(S): 50 TABLET ORAL at 06:04

## 2019-04-28 RX ADMIN — Medication 5 MILLIGRAM(S): at 19:30

## 2019-04-28 RX ADMIN — HYDROMORPHONE HYDROCHLORIDE 6 MILLIGRAM(S): 2 INJECTION INTRAMUSCULAR; INTRAVENOUS; SUBCUTANEOUS at 15:44

## 2019-04-28 RX ADMIN — Medication 3 MILLIGRAM(S): at 22:39

## 2019-04-28 RX ADMIN — Medication 325 MILLIGRAM(S): at 17:36

## 2019-04-28 RX ADMIN — Medication 975 MILLIGRAM(S): at 06:04

## 2019-04-28 RX ADMIN — Medication 975 MILLIGRAM(S): at 22:39

## 2019-04-28 RX ADMIN — HYDROMORPHONE HYDROCHLORIDE 6 MILLIGRAM(S): 2 INJECTION INTRAMUSCULAR; INTRAVENOUS; SUBCUTANEOUS at 03:12

## 2019-04-28 RX ADMIN — TRAMADOL HYDROCHLORIDE 50 MILLIGRAM(S): 50 TABLET ORAL at 06:34

## 2019-04-28 RX ADMIN — Medication 1 MILLIGRAM(S): at 13:43

## 2019-04-28 RX ADMIN — LISINOPRIL 10 MILLIGRAM(S): 2.5 TABLET ORAL at 06:04

## 2019-04-28 RX ADMIN — MORPHINE SULFATE 15 MILLIGRAM(S): 50 CAPSULE, EXTENDED RELEASE ORAL at 22:39

## 2019-04-28 RX ADMIN — Medication 0.1 MILLIGRAM(S): at 06:03

## 2019-04-28 RX ADMIN — Medication 1 TABLET(S): at 06:04

## 2019-04-28 RX ADMIN — MORPHINE SULFATE 15 MILLIGRAM(S): 50 CAPSULE, EXTENDED RELEASE ORAL at 06:34

## 2019-04-28 RX ADMIN — Medication 975 MILLIGRAM(S): at 13:43

## 2019-04-28 RX ADMIN — HYDROMORPHONE HYDROCHLORIDE 6 MILLIGRAM(S): 2 INJECTION INTRAMUSCULAR; INTRAVENOUS; SUBCUTANEOUS at 09:30

## 2019-04-28 RX ADMIN — HYDROMORPHONE HYDROCHLORIDE 6 MILLIGRAM(S): 2 INJECTION INTRAMUSCULAR; INTRAVENOUS; SUBCUTANEOUS at 03:45

## 2019-04-28 RX ADMIN — Medication 250 MILLIGRAM(S): at 09:33

## 2019-04-28 RX ADMIN — MORPHINE SULFATE 15 MILLIGRAM(S): 50 CAPSULE, EXTENDED RELEASE ORAL at 23:10

## 2019-04-28 RX ADMIN — Medication 1 TABLET(S): at 13:43

## 2019-04-28 RX ADMIN — TRAMADOL HYDROCHLORIDE 50 MILLIGRAM(S): 50 TABLET ORAL at 22:40

## 2019-04-28 RX ADMIN — AMLODIPINE BESYLATE 10 MILLIGRAM(S): 2.5 TABLET ORAL at 06:04

## 2019-04-28 RX ADMIN — TRAMADOL HYDROCHLORIDE 50 MILLIGRAM(S): 50 TABLET ORAL at 23:10

## 2019-04-28 RX ADMIN — TRAMADOL HYDROCHLORIDE 50 MILLIGRAM(S): 50 TABLET ORAL at 14:43

## 2019-04-28 RX ADMIN — Medication 325 MILLIGRAM(S): at 06:03

## 2019-04-28 RX ADMIN — Medication 325 MILLIGRAM(S): at 13:43

## 2019-04-28 RX ADMIN — Medication 1 PATCH: at 13:27

## 2019-04-28 RX ADMIN — Medication 250 MILLIGRAM(S): at 22:39

## 2019-04-28 RX ADMIN — Medication 100 MILLIGRAM(S): at 06:04

## 2019-04-28 RX ADMIN — MORPHINE SULFATE 15 MILLIGRAM(S): 50 CAPSULE, EXTENDED RELEASE ORAL at 14:43

## 2019-04-28 RX ADMIN — HYDROMORPHONE HYDROCHLORIDE 6 MILLIGRAM(S): 2 INJECTION INTRAMUSCULAR; INTRAVENOUS; SUBCUTANEOUS at 20:55

## 2019-04-28 RX ADMIN — PANTOPRAZOLE SODIUM 40 MILLIGRAM(S): 20 TABLET, DELAYED RELEASE ORAL at 06:04

## 2019-04-28 RX ADMIN — Medication 1 PATCH: at 07:25

## 2019-04-28 RX ADMIN — Medication 100 MILLIGRAM(S): at 22:39

## 2019-04-28 RX ADMIN — Medication 5 MILLIGRAM(S): at 10:42

## 2019-04-28 RX ADMIN — HYDROMORPHONE HYDROCHLORIDE 6 MILLIGRAM(S): 2 INJECTION INTRAMUSCULAR; INTRAVENOUS; SUBCUTANEOUS at 16:44

## 2019-04-28 RX ADMIN — Medication 1 TABLET(S): at 22:40

## 2019-04-28 NOTE — PROGRESS NOTE ADULT - ASSESSMENT
HTN  stable     CAD history of stent  cont asa    Mod as   stable     Anemia  Monitor hemoglobin, transfuse as needed.    DVT proph  on asa bid       follow up labs

## 2019-04-28 NOTE — PROGRESS NOTE ADULT - ASSESSMENT
S/P Rt Hip YARI/I&D/antibiotic spacer      Plan    Con't vanco  OOB/PT/NWB RLE  D/C planning  D/C delvis DAVIS          Vee Cruz PA-C   Beeper    [phone]

## 2019-04-28 NOTE — PROGRESS NOTE ADULT - SUBJECTIVE AND OBJECTIVE BOX
Subjective: Patient seen and examined. No new events except as noted.     SUBJECTIVE/ROS:        MEDICATIONS:  MEDICATIONS  (STANDING):  acetaminophen   Tablet .. 975 milliGRAM(s) Oral every 8 hours  amLODIPine   Tablet 10 milliGRAM(s) Oral daily  aspirin enteric coated 325 milliGRAM(s) Oral two times a day  calcium carbonate 1250 mG  + Vitamin D (OsCal 500 + D) 1 Tablet(s) Oral three times a day  chlorhexidine 4% Liquid 1 Application(s) Topical <User Schedule>  cloNIDine 0.1 milliGRAM(s) Oral two times a day  docusate sodium 100 milliGRAM(s) Oral three times a day  ferrous    sulfate 325 milliGRAM(s) Oral daily  folic acid 1 milliGRAM(s) Oral daily  labetalol 100 milliGRAM(s) Oral three times a day  lactated ringers. 1000 milliLiter(s) (75 mL/Hr) IV Continuous <Continuous>  lisinopril 10 milliGRAM(s) Oral daily  morphine ER Tablet 15 milliGRAM(s) Oral every 8 hours  multivitamin 1 Tablet(s) Oral daily  nicotine -  14 mG/24Hr(s) Patch 1 patch Transdermal daily  pantoprazole    Tablet 40 milliGRAM(s) Oral before breakfast  polyethylene glycol 3350 17 Gram(s) Oral daily  traMADol 50 milliGRAM(s) Oral every 8 hours  vancomycin  IVPB 750 milliGRAM(s) IV Intermittent every 12 hours      PHYSICAL EXAM:  T(C): 36.7 (04-28-19 @ 05:15), Max: 36.8 (04-27-19 @ 09:30)  HR: 71 (04-28-19 @ 05:15) (65 - 71)  BP: 132/77 (04-28-19 @ 05:15) (106/57 - 162/69)  RR: 18 (04-28-19 @ 05:15) (18 - 18)  SpO2: 96% (04-28-19 @ 05:15) (94% - 97%)  Wt(kg): --  I&O's Summary    27 Apr 2019 07:01  -  28 Apr 2019 07:00  --------------------------------------------------------  IN: 1690 mL / OUT: 4000 mL / NET: -2310 mL            JVP: Normal  Neck: supple  Lung: clear   CV: S1 S2 , Murmur:  Abd: soft  Ext: No edema  neuro: Awake / alert  Psych: flat affect  Skin: normal``    LABS/DATA:    CARDIAC MARKERS:                  proBNP:   Lipid Profile:   HgA1c:   TSH:     TELE:  EKG:

## 2019-04-28 NOTE — PROVIDER CONTACT NOTE (OTHER) - ASSESSMENT
Pt states "I want to kill myself" during PT session with Willy KINGSLEY, as well as in room with PCA and RN. Pt states she feels like "she will never walk again". Pt currently on 1:1 constant observation for safety, pt restless and impulsive. Safety maintained.

## 2019-04-28 NOTE — PROVIDER CONTACT NOTE (OTHER) - ACTION/TREATMENT ORDERED:
Vee and Gracy aware, asked if she wants to be evaluated by psych. Reports that when she says this phrase that she is "only kidding" & that she "does not mean it". 1:1 maintained. Safety maintained

## 2019-04-28 NOTE — CHART NOTE - NSCHARTNOTEFT_GEN_A_CORE
Ortho PA Note      Went to speak with patient to ask if wants to see psychiatry after patient told  WILLIE- Qing - she wants to kill herself.   Patient stated she "used that phrase" because she is frustrated but does not really want to kill   herself and declined to have a psych consult.       LELAND Lundberg  Orthopedic Surgery  [phone]

## 2019-04-28 NOTE — PROVIDER CONTACT NOTE (OTHER) - ASSESSMENT
Pt ambulated twice today with PT, remained NWB RLE. Pt ambulating with staff to bathroom, NWB restrictions maintained. Moderate s/s drainage seen on R hip aquacel drsgs, reinforced with combine/tape. VSS. Pt asymptomatic, no s/s anemia.

## 2019-04-28 NOTE — PROGRESS NOTE ADULT - SUBJECTIVE AND OBJECTIVE BOX
Patient is a 73y old  Female who presents with a chief complaint of R hip/femur infected hardware (27 Apr 2019 12:45)      POST OPERATIVE DAY #: 5  Patient awake no complaints at present       VS:  T(C): 36.7 (04-28-19 @ 05:15), Max: 36.8 (04-27-19 @ 09:30)  T(F): 98 (04-28-19 @ 05:15), Max: 98.2 (04-27-19 @ 09:30)  HR: 71 (04-28-19 @ 05:15) (65 - 71)  BP: 132/77 (04-28-19 @ 05:15) (106/57 - 162/69)  RR: 18 (04-28-19 @ 05:15) (18 - 18)  SpO2: 96% (04-28-19 @ 05:15) (94% - 97%)  Wt(kg): --      PHYSICAL EXAM:  NAD, Alert  EXT:   Rt Hip:Aquacel Dressing C/D/I  No Calf Tenderness  (+) DF/PF; (+) Distal Pulses;  Sensation: No gross deficits noted  Pulses 2+   B/L, PAS

## 2019-04-29 LAB
BLD GP AB SCN SERPL QL: NEGATIVE — SIGNIFICANT CHANGE UP
HCT VFR BLD CALC: 29.6 % — LOW (ref 34.5–45)
HGB BLD-MCNC: 9.5 G/DL — LOW (ref 11.5–15.5)
MCHC RBC-ENTMCNC: 29.8 PG — SIGNIFICANT CHANGE UP (ref 27–34)
MCHC RBC-ENTMCNC: 32.2 GM/DL — SIGNIFICANT CHANGE UP (ref 32–36)
MCV RBC AUTO: 92.5 FL — SIGNIFICANT CHANGE UP (ref 80–100)
PLATELET # BLD AUTO: 302 K/UL — SIGNIFICANT CHANGE UP (ref 150–400)
RBC # BLD: 3.2 M/UL — LOW (ref 3.8–5.2)
RBC # FLD: 16.4 % — HIGH (ref 10.3–14.5)
RH IG SCN BLD-IMP: POSITIVE — SIGNIFICANT CHANGE UP
VANCOMYCIN TROUGH SERPL-MCNC: 15 UG/ML — SIGNIFICANT CHANGE UP (ref 10–20)
WBC # BLD: 7.9 K/UL — SIGNIFICANT CHANGE UP (ref 3.8–10.5)
WBC # FLD AUTO: 7.9 K/UL — SIGNIFICANT CHANGE UP (ref 3.8–10.5)

## 2019-04-29 PROCEDURE — 99232 SBSQ HOSP IP/OBS MODERATE 35: CPT

## 2019-04-29 RX ADMIN — HYDROMORPHONE HYDROCHLORIDE 6 MILLIGRAM(S): 2 INJECTION INTRAMUSCULAR; INTRAVENOUS; SUBCUTANEOUS at 03:35

## 2019-04-29 RX ADMIN — Medication 100 MILLIGRAM(S): at 06:26

## 2019-04-29 RX ADMIN — Medication 975 MILLIGRAM(S): at 06:26

## 2019-04-29 RX ADMIN — TRAMADOL HYDROCHLORIDE 50 MILLIGRAM(S): 50 TABLET ORAL at 22:06

## 2019-04-29 RX ADMIN — Medication 1 PATCH: at 12:11

## 2019-04-29 RX ADMIN — Medication 975 MILLIGRAM(S): at 22:06

## 2019-04-29 RX ADMIN — HYDROMORPHONE HYDROCHLORIDE 6 MILLIGRAM(S): 2 INJECTION INTRAMUSCULAR; INTRAVENOUS; SUBCUTANEOUS at 18:06

## 2019-04-29 RX ADMIN — MORPHINE SULFATE 15 MILLIGRAM(S): 50 CAPSULE, EXTENDED RELEASE ORAL at 06:56

## 2019-04-29 RX ADMIN — Medication 0.1 MILLIGRAM(S): at 06:27

## 2019-04-29 RX ADMIN — Medication 1 TABLET(S): at 14:06

## 2019-04-29 RX ADMIN — MORPHINE SULFATE 15 MILLIGRAM(S): 50 CAPSULE, EXTENDED RELEASE ORAL at 14:09

## 2019-04-29 RX ADMIN — Medication 325 MILLIGRAM(S): at 18:07

## 2019-04-29 RX ADMIN — Medication 3 MILLIGRAM(S): at 22:05

## 2019-04-29 RX ADMIN — SENNA PLUS 2 TABLET(S): 8.6 TABLET ORAL at 22:05

## 2019-04-29 RX ADMIN — TRAMADOL HYDROCHLORIDE 50 MILLIGRAM(S): 50 TABLET ORAL at 14:43

## 2019-04-29 RX ADMIN — Medication 1 TABLET(S): at 06:26

## 2019-04-29 RX ADMIN — TRAMADOL HYDROCHLORIDE 50 MILLIGRAM(S): 50 TABLET ORAL at 14:09

## 2019-04-29 RX ADMIN — Medication 1 PATCH: at 18:06

## 2019-04-29 RX ADMIN — MORPHINE SULFATE 15 MILLIGRAM(S): 50 CAPSULE, EXTENDED RELEASE ORAL at 23:06

## 2019-04-29 RX ADMIN — MORPHINE SULFATE 15 MILLIGRAM(S): 50 CAPSULE, EXTENDED RELEASE ORAL at 14:43

## 2019-04-29 RX ADMIN — AMLODIPINE BESYLATE 10 MILLIGRAM(S): 2.5 TABLET ORAL at 06:26

## 2019-04-29 RX ADMIN — LISINOPRIL 10 MILLIGRAM(S): 2.5 TABLET ORAL at 06:26

## 2019-04-29 RX ADMIN — Medication 5 MILLIGRAM(S): at 10:01

## 2019-04-29 RX ADMIN — Medication 5 MILLIGRAM(S): at 20:21

## 2019-04-29 RX ADMIN — Medication 1 MILLIGRAM(S): at 12:10

## 2019-04-29 RX ADMIN — MORPHINE SULFATE 15 MILLIGRAM(S): 50 CAPSULE, EXTENDED RELEASE ORAL at 06:26

## 2019-04-29 RX ADMIN — TRAMADOL HYDROCHLORIDE 50 MILLIGRAM(S): 50 TABLET ORAL at 23:06

## 2019-04-29 RX ADMIN — HYDROMORPHONE HYDROCHLORIDE 6 MILLIGRAM(S): 2 INJECTION INTRAMUSCULAR; INTRAVENOUS; SUBCUTANEOUS at 13:48

## 2019-04-29 RX ADMIN — Medication 975 MILLIGRAM(S): at 14:07

## 2019-04-29 RX ADMIN — Medication 250 MILLIGRAM(S): at 22:06

## 2019-04-29 RX ADMIN — Medication 975 MILLIGRAM(S): at 06:56

## 2019-04-29 RX ADMIN — Medication 100 MILLIGRAM(S): at 22:05

## 2019-04-29 RX ADMIN — Medication 975 MILLIGRAM(S): at 14:43

## 2019-04-29 RX ADMIN — Medication 1 TABLET(S): at 12:10

## 2019-04-29 RX ADMIN — PANTOPRAZOLE SODIUM 40 MILLIGRAM(S): 20 TABLET, DELAYED RELEASE ORAL at 06:26

## 2019-04-29 RX ADMIN — Medication 975 MILLIGRAM(S): at 23:06

## 2019-04-29 RX ADMIN — Medication 250 MILLIGRAM(S): at 12:10

## 2019-04-29 RX ADMIN — TRAMADOL HYDROCHLORIDE 50 MILLIGRAM(S): 50 TABLET ORAL at 06:56

## 2019-04-29 RX ADMIN — HYDROMORPHONE HYDROCHLORIDE 6 MILLIGRAM(S): 2 INJECTION INTRAMUSCULAR; INTRAVENOUS; SUBCUTANEOUS at 18:32

## 2019-04-29 RX ADMIN — Medication 325 MILLIGRAM(S): at 06:26

## 2019-04-29 RX ADMIN — MORPHINE SULFATE 15 MILLIGRAM(S): 50 CAPSULE, EXTENDED RELEASE ORAL at 22:06

## 2019-04-29 RX ADMIN — CHLORHEXIDINE GLUCONATE 1 APPLICATION(S): 213 SOLUTION TOPICAL at 06:27

## 2019-04-29 RX ADMIN — HYDROMORPHONE HYDROCHLORIDE 6 MILLIGRAM(S): 2 INJECTION INTRAMUSCULAR; INTRAVENOUS; SUBCUTANEOUS at 22:37

## 2019-04-29 RX ADMIN — Medication 1 TABLET(S): at 22:05

## 2019-04-29 RX ADMIN — HYDROMORPHONE HYDROCHLORIDE 6 MILLIGRAM(S): 2 INJECTION INTRAMUSCULAR; INTRAVENOUS; SUBCUTANEOUS at 12:48

## 2019-04-29 RX ADMIN — TRAMADOL HYDROCHLORIDE 50 MILLIGRAM(S): 50 TABLET ORAL at 06:26

## 2019-04-29 RX ADMIN — HYDROMORPHONE HYDROCHLORIDE 6 MILLIGRAM(S): 2 INJECTION INTRAMUSCULAR; INTRAVENOUS; SUBCUTANEOUS at 23:37

## 2019-04-29 RX ADMIN — Medication 325 MILLIGRAM(S): at 12:09

## 2019-04-29 RX ADMIN — HYDROMORPHONE HYDROCHLORIDE 6 MILLIGRAM(S): 2 INJECTION INTRAMUSCULAR; INTRAVENOUS; SUBCUTANEOUS at 04:06

## 2019-04-29 NOTE — PROGRESS NOTE ADULT - SUBJECTIVE AND OBJECTIVE BOX
Follow Up:  hardware infection with prosthetic joint infection, cellulitis    Interval History: pt stable and afebrile, s/p PICC line    ROS:      All other systems negative    Constitutional: no fever, chills  Eye: no eye pain, no redness, no vision changes  ENT:  no sore throat, no rhinorrhea  Cardiovascular:  no chest pain, no palpitation  Respiratory:  no SOB, no cough  GI:  no abd pain, no vomiting, no diarrhea  urinary: no dysuria, no hematuria, no flank pain  : no  discharge or bleeding  musculoskeletal:  R hip pain  skin:  no rash  neurology:  no headache, no seizure, no change in mental status  psych: no anxiety      Allergies  No Known Allergies        ANTIMICROBIALS:  vancomycin  IVPB 750 every 12 hours      OTHER MEDS:  acetaminophen   Tablet .. 975 milliGRAM(s) Oral every 8 hours  ALBUTerol/ipratropium for Nebulization 3 milliLiter(s) Nebulizer every 6 hours PRN  aluminum hydroxide/magnesium hydroxide/simethicone Suspension 30 milliLiter(s) Oral four times a day PRN  amLODIPine   Tablet 10 milliGRAM(s) Oral daily  aspirin enteric coated 325 milliGRAM(s) Oral two times a day  bisacodyl Suppository 10 milliGRAM(s) Rectal daily PRN  calcium carbonate 1250 mG  + Vitamin D (OsCal 500 + D) 1 Tablet(s) Oral three times a day  chlorhexidine 4% Liquid 1 Application(s) Topical <User Schedule>  cloNIDine 0.1 milliGRAM(s) Oral two times a day  diazepam    Tablet 5 milliGRAM(s) Oral every 8 hours PRN  docusate sodium 100 milliGRAM(s) Oral three times a day  ferrous    sulfate 325 milliGRAM(s) Oral daily  folic acid 1 milliGRAM(s) Oral daily  HYDROmorphone   Tablet 4 milliGRAM(s) Oral every 4 hours PRN  HYDROmorphone   Tablet 6 milliGRAM(s) Oral every 4 hours PRN  HYDROmorphone  Injectable 1 milliGRAM(s) IV Push every 3 hours PRN  labetalol 100 milliGRAM(s) Oral three times a day  lactated ringers. 1000 milliLiter(s) IV Continuous <Continuous>  lisinopril 10 milliGRAM(s) Oral daily  magnesium hydroxide Suspension 30 milliLiter(s) Oral daily PRN  melatonin 3 milliGRAM(s) Oral at bedtime PRN  morphine ER Tablet 15 milliGRAM(s) Oral every 8 hours  multivitamin 1 Tablet(s) Oral daily  nicotine -  14 mG/24Hr(s) Patch 1 patch Transdermal daily  ondansetron Injectable 4 milliGRAM(s) IV Push every 6 hours PRN  pantoprazole    Tablet 40 milliGRAM(s) Oral before breakfast  polyethylene glycol 3350 17 Gram(s) Oral daily  senna 2 Tablet(s) Oral at bedtime PRN  sodium chloride 0.9% lock flush 10 milliLiter(s) IV Push every 1 hour PRN  traMADol 50 milliGRAM(s) Oral every 8 hours      Vital Signs Last 24 Hrs  T(C): 36.7 (29 Apr 2019 13:58), Max: 36.9 (28 Apr 2019 16:44)  T(F): 98 (29 Apr 2019 13:58), Max: 98.4 (28 Apr 2019 16:44)  HR: 66 (29 Apr 2019 13:58) (63 - 70)  BP: 101/61 (29 Apr 2019 13:58) (101/61 - 145/78)  BP(mean): --  RR: 18 (29 Apr 2019 13:58) (16 - 18)  SpO2: 97% (29 Apr 2019 13:58) (94% - 98%)    Physical Exam:  General:    NAD,  non toxic  Head: atraumatic, normocephalic  Eye: normal sclera and conjunctiva  ENT:    no oropharyngeal lesions,   no LAD,   neck supple  Cardio:     regular S1, S2,  no murmur  Respiratory:    clear b/l,    no wheezing  abd:     soft,   BS +,   no tenderness,    no organomegaly  :   no CVAT,  no suprapubic tenderness  Musculoskeletal: R hip and thigh with clean dressing  vascular: RUE PICC, normal pulses  Skin:    no rash  Neurologic:     no focal deficit  psych: normal affect                          9.5    7.9   )-----------( 302      ( 29 Apr 2019 13:34 )             29.6                   MICROBIOLOGY:  Vancomycin Level, Trough: 15.0 ug/mL (04-29-19 @ 11:11)  v  .Tissue Right Hip Skin  04-24-19   Rare Methicillin resistant Staphylococcus aureus  --  Methicillin resistant Staphylococcus aureus      .Surgical Swab  04-24-19   Few Methicillin resistant Staphylococcus aureus  --  Methicillin resistant Staphylococcus aureus      .Blood  04-22-19   No growth at 5 days.  --  --      .Blood  04-21-19   No growth at 5 days.  --  --                RADIOLOGY:  Images below reviewed personally  < from: Xray Chest 1 View- PORTABLE-Urgent (04.26.19 @ 17:30) >    Impression: Right upper extremity PICC line in good position.      < from: CT Lower Extremity w/ IV Cont, Right (04.21.19 @ 21:56) >  IMPRESSION:    Redemonstrated post surgical changes status post plate and screw fixation   of fractures in the mid to proximal femoral shaft, with some persistent   lucency at these chronic fracture sites. Linear lucency in the   heterotopic ossification and medial cortex of the proximal femoral shaft,   likely representing acute or subacute fracture. Correlate clinically.   Loosening of the proximal right foraminal component redemonstrated.   Diffuse subcutaneous edema. Correlate clinically for possible cellulitis.   No discrete drainable fluid collections are seen, evaluation limited by   extensive metallic artifact. Small knee joint effusion.

## 2019-04-29 NOTE — PROVIDER CONTACT NOTE (CRITICAL VALUE NOTIFICATION) - NS PROVIDER READ BACK
yes/Surgical swab collected 4/23/19, performed twice- 1st showing few MRSA, 2nd showing moderate MRSA. Tissue culture collected same day showed rare MRSA in R hip

## 2019-04-29 NOTE — PROGRESS NOTE ADULT - SUBJECTIVE AND OBJECTIVE BOX
Subjective: Patient seen and examined. No new events except as noted.     SUBJECTIVE/ROS:  NAD      MEDICATIONS:  MEDICATIONS  (STANDING):  acetaminophen   Tablet .. 975 milliGRAM(s) Oral every 8 hours  amLODIPine   Tablet 10 milliGRAM(s) Oral daily  aspirin enteric coated 325 milliGRAM(s) Oral two times a day  calcium carbonate 1250 mG  + Vitamin D (OsCal 500 + D) 1 Tablet(s) Oral three times a day  chlorhexidine 4% Liquid 1 Application(s) Topical <User Schedule>  cloNIDine 0.1 milliGRAM(s) Oral two times a day  docusate sodium 100 milliGRAM(s) Oral three times a day  ferrous    sulfate 325 milliGRAM(s) Oral daily  folic acid 1 milliGRAM(s) Oral daily  labetalol 100 milliGRAM(s) Oral three times a day  lactated ringers. 1000 milliLiter(s) (75 mL/Hr) IV Continuous <Continuous>  lisinopril 10 milliGRAM(s) Oral daily  morphine ER Tablet 15 milliGRAM(s) Oral every 8 hours  multivitamin 1 Tablet(s) Oral daily  nicotine -  14 mG/24Hr(s) Patch 1 patch Transdermal daily  pantoprazole    Tablet 40 milliGRAM(s) Oral before breakfast  polyethylene glycol 3350 17 Gram(s) Oral daily  traMADol 50 milliGRAM(s) Oral every 8 hours  vancomycin  IVPB 750 milliGRAM(s) IV Intermittent every 12 hours      PHYSICAL EXAM:  T(C): 36.6 (04-29-19 @ 04:54), Max: 36.9 (04-28-19 @ 16:44)  HR: 63 (04-29-19 @ 04:54) (63 - 70)  BP: 143/68 (04-29-19 @ 04:54) (104/52 - 145/78)  RR: 16 (04-29-19 @ 04:54) (16 - 18)  SpO2: 94% (04-29-19 @ 04:54) (94% - 98%)  Wt(kg): --  I&O's Summary    28 Apr 2019 07:01  -  29 Apr 2019 07:00  --------------------------------------------------------  IN: 1060 mL / OUT: 2900 mL / NET: -1840 mL    29 Apr 2019 07:01  -  29 Apr 2019 09:02  --------------------------------------------------------  IN: 0 mL / OUT: 150 mL / NET: -150 mL            JVP: Normal  Neck: supple  Lung: clear   CV: S1 S2 , Murmur:  Abd: soft  Ext: No edema  neuro: Awake / alert  Psych: flat affect  Skin: normal``    LABS/DATA:    CARDIAC MARKERS:                  proBNP:   Lipid Profile:   HgA1c:   TSH:     TELE:  EKG:

## 2019-04-29 NOTE — PROGRESS NOTE ADULT - ASSESSMENT
HTN  stable   cont current meds    CAD history of stent  cont asa    Mod as   stable     Anemia  Monitor hemoglobin, transfuse as needed.    DVT proph  on asa bid       follow up labs

## 2019-04-29 NOTE — PROGRESS NOTE ADULT - SUBJECTIVE AND OBJECTIVE BOX
Orthopaedic Surgery Progress Note    Subjective:   Patient seen and examined  No acute events overnight  Says she did not sleep well overnight    Objective:  T(C): 36.6 (04-29-19 @ 04:54), Max: 36.9 (04-28-19 @ 16:44)  HR: 63 (04-29-19 @ 04:54) (63 - 70)  BP: 143/68 (04-29-19 @ 04:54) (104/52 - 145/78)  RR: 16 (04-29-19 @ 04:54) (16 - 18)  SpO2: 94% (04-29-19 @ 04:54) (94% - 98%)    04-27 @ 07:01  -  04-28 @ 07:00  --------------------------------------------------------  IN: 1690 mL / OUT: 4000 mL / NET: -2310 mL    04-28 @ 07:01  -  04-29 @ 06:22  --------------------------------------------------------  IN: 810 mL / OUT: 2200 mL / NET: -1390 mL    PE  NAD  RLE:  dressing C/D/I  motor intact GS/TA/EHL  SILT S/S/SP/DP  WWP    73y Female POD6 s/p R femur YARI, I&D, abx spacer placement    - Pain control  - NWB RLE  - Posterior dislocation precautions  - FU OR Cx, ID recs  - Abx per ID, PICC line  - DVT ppx  - Dispo planning    Deni Lakhani MD

## 2019-04-29 NOTE — PROGRESS NOTE ADULT - ASSESSMENT
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 THR, kika-prosthetic R femur fx (s/p Total Hip revision with ORIF, 6/30/18), recent re-admission (07/16 - 07/24/18) for worsening R hip pain and decreased ability to ambulate now s/p R femur [place] B1 periprosthetic fracture ORIF (07/19/18) admitted 8/2018 with MRSA bacteremia and hardware collection s/p washout but the hardware was not removed, OR cxs also with MRSA she completed a course of vanco and then bactrim for suppressive therapy but again  presented with hip erythema and edema again the hardware was not removed in hope for femur union, and she received another course of vanco and then doxy and symptoms started after she finished the antibiotics and came back with fever, chills, erythema edema and fluctuation on the hip incision  here afebrile WBC normal  CT with lucency, fractures and cellulitis  s/p hardware removal 4/23, purulence was found along the entire lateral aspect of R leg with loose hardware, pacer palced      hardware infection previously MRSA bacteremia and prosthetic joint infection with cellulitis and ?abscess  s/p hardware removal, purulence found along the entire lateral aspect of the R leg with loose hardware, spacer placement 2/23  OR cxs MRSA as expected    * s/p PICC  * c/w vanco 750 q 12  * will need least 6 weeks of vanco after the surgery 4/23 until 6/4  * weekly CBC, CMP, ESR, CRP and vanco trough after discharge

## 2019-04-29 NOTE — PROVIDER CONTACT NOTE (CRITICAL VALUE NOTIFICATION) - NS PROVIDER READ BACK TO LAB
Surgical swab collected 4/23/19, performed twice- 1st showing few MRSA, 2nd showing moderate MRSA. Tissue culture collected same day showed rare MRSA in R hip/yes

## 2019-04-29 NOTE — PROVIDER CONTACT NOTE (CRITICAL VALUE NOTIFICATION) - TEST AND RESULT REPORTED:
Surgical swab collected 4/23/19, performed twice- 1st showing few MRSA, 2nd showing moderate MRSA. Tissue culture collected same day showed rare MRSA in R hip

## 2019-04-30 LAB — VANCOMYCIN TROUGH SERPL-MCNC: 20.1 UG/ML — HIGH (ref 10–20)

## 2019-04-30 RX ORDER — IPRATROPIUM/ALBUTEROL SULFATE 18-103MCG
3 AEROSOL WITH ADAPTER (GRAM) INHALATION
Qty: 0 | Refills: 0 | DISCHARGE
Start: 2019-04-30

## 2019-04-30 RX ORDER — HYDROMORPHONE HYDROCHLORIDE 2 MG/ML
6 INJECTION INTRAMUSCULAR; INTRAVENOUS; SUBCUTANEOUS EVERY 4 HOURS
Qty: 0 | Refills: 0 | Status: DISCONTINUED | OUTPATIENT
Start: 2019-04-30 | End: 2019-05-02

## 2019-04-30 RX ORDER — PANTOPRAZOLE SODIUM 20 MG/1
1 TABLET, DELAYED RELEASE ORAL
Qty: 0 | Refills: 0 | DISCHARGE
Start: 2019-04-30

## 2019-04-30 RX ORDER — DIAZEPAM 5 MG
1 TABLET ORAL
Qty: 0 | Refills: 0 | DISCHARGE
Start: 2019-04-30

## 2019-04-30 RX ORDER — FERROUS SULFATE 325(65) MG
1 TABLET ORAL
Qty: 0 | Refills: 0 | DISCHARGE
Start: 2019-04-30

## 2019-04-30 RX ORDER — HYDROMORPHONE HYDROCHLORIDE 2 MG/ML
3 INJECTION INTRAMUSCULAR; INTRAVENOUS; SUBCUTANEOUS
Qty: 0 | Refills: 0 | DISCHARGE
Start: 2019-04-30

## 2019-04-30 RX ORDER — TRAMADOL HYDROCHLORIDE 50 MG/1
50 TABLET ORAL EVERY 8 HOURS
Qty: 0 | Refills: 0 | Status: DISCONTINUED | OUTPATIENT
Start: 2019-04-30 | End: 2019-05-02

## 2019-04-30 RX ORDER — ACETAMINOPHEN 500 MG
3 TABLET ORAL
Qty: 0 | Refills: 0 | DISCHARGE
Start: 2019-04-30

## 2019-04-30 RX ORDER — DIPHENOXYLATE HCL/ATROPINE 2.5-.025MG
0 TABLET ORAL
Qty: 12 | Refills: 0 | COMMUNITY

## 2019-04-30 RX ORDER — DIAZEPAM 5 MG
5 TABLET ORAL EVERY 8 HOURS
Qty: 0 | Refills: 0 | Status: DISCONTINUED | OUTPATIENT
Start: 2019-04-30 | End: 2019-05-02

## 2019-04-30 RX ORDER — POLYETHYLENE GLYCOL 3350 17 G/17G
17 POWDER, FOR SOLUTION ORAL
Qty: 0 | Refills: 0 | DISCHARGE
Start: 2019-04-30

## 2019-04-30 RX ORDER — TRAMADOL HYDROCHLORIDE 50 MG/1
1 TABLET ORAL
Qty: 0 | Refills: 0 | DISCHARGE
Start: 2019-04-30

## 2019-04-30 RX ORDER — LABETALOL HCL 100 MG
1 TABLET ORAL
Qty: 0 | Refills: 0 | DISCHARGE
Start: 2019-04-30

## 2019-04-30 RX ORDER — MORPHINE SULFATE 50 MG/1
15 CAPSULE, EXTENDED RELEASE ORAL EVERY 8 HOURS
Qty: 0 | Refills: 0 | Status: DISCONTINUED | OUTPATIENT
Start: 2019-04-30 | End: 2019-05-02

## 2019-04-30 RX ORDER — HYDROMORPHONE HYDROCHLORIDE 2 MG/ML
1 INJECTION INTRAMUSCULAR; INTRAVENOUS; SUBCUTANEOUS
Qty: 0 | Refills: 0 | Status: DISCONTINUED | OUTPATIENT
Start: 2019-04-30 | End: 2019-05-02

## 2019-04-30 RX ORDER — SENNA PLUS 8.6 MG/1
2 TABLET ORAL
Qty: 0 | Refills: 0 | DISCHARGE
Start: 2019-04-30

## 2019-04-30 RX ORDER — MORPHINE SULFATE 50 MG/1
1 CAPSULE, EXTENDED RELEASE ORAL
Qty: 0 | Refills: 0 | DISCHARGE
Start: 2019-04-30

## 2019-04-30 RX ORDER — FOLIC ACID 0.8 MG
1 TABLET ORAL
Qty: 0 | Refills: 0 | DISCHARGE
Start: 2019-04-30

## 2019-04-30 RX ORDER — HYDROMORPHONE HYDROCHLORIDE 2 MG/ML
4 INJECTION INTRAMUSCULAR; INTRAVENOUS; SUBCUTANEOUS EVERY 4 HOURS
Qty: 0 | Refills: 0 | Status: DISCONTINUED | OUTPATIENT
Start: 2019-04-30 | End: 2019-05-02

## 2019-04-30 RX ORDER — ACETAMINOPHEN WITH CODEINE 300MG-30MG
0 TABLET ORAL
Qty: 60 | Refills: 0 | COMMUNITY

## 2019-04-30 RX ORDER — VANCOMYCIN HCL 1 G
750 VIAL (EA) INTRAVENOUS
Qty: 0 | Refills: 0 | DISCHARGE
Start: 2019-04-30

## 2019-04-30 RX ORDER — LANOLIN ALCOHOL/MO/W.PET/CERES
1 CREAM (GRAM) TOPICAL
Qty: 0 | Refills: 0 | DISCHARGE
Start: 2019-04-30

## 2019-04-30 RX ORDER — DOCUSATE SODIUM 100 MG
1 CAPSULE ORAL
Qty: 0 | Refills: 0 | DISCHARGE
Start: 2019-04-30

## 2019-04-30 RX ORDER — NICOTINE POLACRILEX 2 MG
1 GUM BUCCAL
Qty: 0 | Refills: 0 | DISCHARGE
Start: 2019-04-30

## 2019-04-30 RX ORDER — HYDROMORPHONE HYDROCHLORIDE 2 MG/ML
1 INJECTION INTRAMUSCULAR; INTRAVENOUS; SUBCUTANEOUS
Qty: 0 | Refills: 0 | DISCHARGE
Start: 2019-04-30

## 2019-04-30 RX ORDER — ASPIRIN/CALCIUM CARB/MAGNESIUM 324 MG
1 TABLET ORAL
Qty: 0 | Refills: 0 | COMMUNITY
Start: 2019-04-30

## 2019-04-30 RX ORDER — ASPIRIN/CALCIUM CARB/MAGNESIUM 324 MG
1 TABLET ORAL
Qty: 0 | Refills: 0 | DISCHARGE
Start: 2019-04-30

## 2019-04-30 RX ADMIN — MORPHINE SULFATE 15 MILLIGRAM(S): 50 CAPSULE, EXTENDED RELEASE ORAL at 14:45

## 2019-04-30 RX ADMIN — Medication 1 PATCH: at 13:54

## 2019-04-30 RX ADMIN — Medication 0.1 MILLIGRAM(S): at 18:30

## 2019-04-30 RX ADMIN — HYDROMORPHONE HYDROCHLORIDE 6 MILLIGRAM(S): 2 INJECTION INTRAMUSCULAR; INTRAVENOUS; SUBCUTANEOUS at 21:15

## 2019-04-30 RX ADMIN — HYDROMORPHONE HYDROCHLORIDE 6 MILLIGRAM(S): 2 INJECTION INTRAMUSCULAR; INTRAVENOUS; SUBCUTANEOUS at 15:03

## 2019-04-30 RX ADMIN — TRAMADOL HYDROCHLORIDE 50 MILLIGRAM(S): 50 TABLET ORAL at 22:34

## 2019-04-30 RX ADMIN — Medication 1 PATCH: at 13:58

## 2019-04-30 RX ADMIN — HYDROMORPHONE HYDROCHLORIDE 6 MILLIGRAM(S): 2 INJECTION INTRAMUSCULAR; INTRAVENOUS; SUBCUTANEOUS at 03:33

## 2019-04-30 RX ADMIN — Medication 975 MILLIGRAM(S): at 22:34

## 2019-04-30 RX ADMIN — Medication 1 TABLET(S): at 06:08

## 2019-04-30 RX ADMIN — CHLORHEXIDINE GLUCONATE 1 APPLICATION(S): 213 SOLUTION TOPICAL at 06:14

## 2019-04-30 RX ADMIN — TRAMADOL HYDROCHLORIDE 50 MILLIGRAM(S): 50 TABLET ORAL at 14:05

## 2019-04-30 RX ADMIN — Medication 975 MILLIGRAM(S): at 22:04

## 2019-04-30 RX ADMIN — Medication 975 MILLIGRAM(S): at 13:57

## 2019-04-30 RX ADMIN — Medication 1 PATCH: at 06:09

## 2019-04-30 RX ADMIN — Medication 325 MILLIGRAM(S): at 18:30

## 2019-04-30 RX ADMIN — Medication 250 MILLIGRAM(S): at 08:41

## 2019-04-30 RX ADMIN — Medication 5 MILLIGRAM(S): at 23:11

## 2019-04-30 RX ADMIN — Medication 975 MILLIGRAM(S): at 14:00

## 2019-04-30 RX ADMIN — Medication 325 MILLIGRAM(S): at 06:08

## 2019-04-30 RX ADMIN — Medication 3 MILLIGRAM(S): at 22:04

## 2019-04-30 RX ADMIN — Medication 1 TABLET(S): at 22:04

## 2019-04-30 RX ADMIN — TRAMADOL HYDROCHLORIDE 50 MILLIGRAM(S): 50 TABLET ORAL at 06:08

## 2019-04-30 RX ADMIN — HYDROMORPHONE HYDROCHLORIDE 6 MILLIGRAM(S): 2 INJECTION INTRAMUSCULAR; INTRAVENOUS; SUBCUTANEOUS at 15:30

## 2019-04-30 RX ADMIN — MORPHINE SULFATE 15 MILLIGRAM(S): 50 CAPSULE, EXTENDED RELEASE ORAL at 06:09

## 2019-04-30 RX ADMIN — MORPHINE SULFATE 15 MILLIGRAM(S): 50 CAPSULE, EXTENDED RELEASE ORAL at 06:13

## 2019-04-30 RX ADMIN — Medication 250 MILLIGRAM(S): at 22:06

## 2019-04-30 RX ADMIN — Medication 100 MILLIGRAM(S): at 22:04

## 2019-04-30 RX ADMIN — MORPHINE SULFATE 15 MILLIGRAM(S): 50 CAPSULE, EXTENDED RELEASE ORAL at 14:05

## 2019-04-30 RX ADMIN — Medication 100 MILLIGRAM(S): at 06:09

## 2019-04-30 RX ADMIN — TRAMADOL HYDROCHLORIDE 50 MILLIGRAM(S): 50 TABLET ORAL at 06:13

## 2019-04-30 RX ADMIN — MORPHINE SULFATE 15 MILLIGRAM(S): 50 CAPSULE, EXTENDED RELEASE ORAL at 22:04

## 2019-04-30 RX ADMIN — HYDROMORPHONE HYDROCHLORIDE 6 MILLIGRAM(S): 2 INJECTION INTRAMUSCULAR; INTRAVENOUS; SUBCUTANEOUS at 20:42

## 2019-04-30 RX ADMIN — TRAMADOL HYDROCHLORIDE 50 MILLIGRAM(S): 50 TABLET ORAL at 22:04

## 2019-04-30 RX ADMIN — HYDROMORPHONE HYDROCHLORIDE 6 MILLIGRAM(S): 2 INJECTION INTRAMUSCULAR; INTRAVENOUS; SUBCUTANEOUS at 08:41

## 2019-04-30 RX ADMIN — Medication 5 MILLIGRAM(S): at 10:53

## 2019-04-30 RX ADMIN — HYDROMORPHONE HYDROCHLORIDE 6 MILLIGRAM(S): 2 INJECTION INTRAMUSCULAR; INTRAVENOUS; SUBCUTANEOUS at 09:43

## 2019-04-30 RX ADMIN — Medication 1 PATCH: at 19:20

## 2019-04-30 RX ADMIN — Medication 975 MILLIGRAM(S): at 06:13

## 2019-04-30 RX ADMIN — PANTOPRAZOLE SODIUM 40 MILLIGRAM(S): 20 TABLET, DELAYED RELEASE ORAL at 06:08

## 2019-04-30 RX ADMIN — TRAMADOL HYDROCHLORIDE 50 MILLIGRAM(S): 50 TABLET ORAL at 14:45

## 2019-04-30 RX ADMIN — HYDROMORPHONE HYDROCHLORIDE 6 MILLIGRAM(S): 2 INJECTION INTRAMUSCULAR; INTRAVENOUS; SUBCUTANEOUS at 02:33

## 2019-04-30 RX ADMIN — MORPHINE SULFATE 15 MILLIGRAM(S): 50 CAPSULE, EXTENDED RELEASE ORAL at 22:34

## 2019-04-30 NOTE — DIETITIAN INITIAL EVALUATION ADULT. - NS AS NUTRI INTERV MEALS SNACK
General/healthful diet/Continue to provide current diet order, no therapeutic diet restrictions indicated at this time. BP controlled per chart. Pt encouraged to continue with good po intake and emphasis on protein foods to promote wound healing./Other (specify)

## 2019-04-30 NOTE — DIETITIAN INITIAL EVALUATION ADULT. - ORAL INTAKE PTA
good po intake PTA reported. NKFA reported. No vitamin/mineral/herbal supplementation PTA reported../good

## 2019-04-30 NOTE — PROGRESS NOTE ADULT - SUBJECTIVE AND OBJECTIVE BOX
Subjective: Patient seen and examined. No new events except as noted.     SUBJECTIVE/ROS:  no new events       MEDICATIONS:  MEDICATIONS  (STANDING):  acetaminophen   Tablet .. 975 milliGRAM(s) Oral every 8 hours  amLODIPine   Tablet 10 milliGRAM(s) Oral daily  aspirin enteric coated 325 milliGRAM(s) Oral two times a day  calcium carbonate 1250 mG  + Vitamin D (OsCal 500 + D) 1 Tablet(s) Oral three times a day  chlorhexidine 4% Liquid 1 Application(s) Topical <User Schedule>  cloNIDine 0.1 milliGRAM(s) Oral two times a day  docusate sodium 100 milliGRAM(s) Oral three times a day  ferrous    sulfate 325 milliGRAM(s) Oral daily  folic acid 1 milliGRAM(s) Oral daily  labetalol 100 milliGRAM(s) Oral three times a day  lactated ringers. 1000 milliLiter(s) (75 mL/Hr) IV Continuous <Continuous>  lisinopril 10 milliGRAM(s) Oral daily  morphine ER Tablet 15 milliGRAM(s) Oral every 8 hours  multivitamin 1 Tablet(s) Oral daily  nicotine -  14 mG/24Hr(s) Patch 1 patch Transdermal daily  pantoprazole    Tablet 40 milliGRAM(s) Oral before breakfast  polyethylene glycol 3350 17 Gram(s) Oral daily  traMADol 50 milliGRAM(s) Oral every 8 hours  vancomycin  IVPB 750 milliGRAM(s) IV Intermittent every 12 hours      PHYSICAL EXAM:  T(C): 36.4 (04-30-19 @ 04:38), Max: 36.8 (04-29-19 @ 09:26)  HR: 65 (04-30-19 @ 04:38) (64 - 69)  BP: 120/65 (04-30-19 @ 04:38) (101/61 - 121/69)  RR: 18 (04-30-19 @ 04:38) (18 - 18)  SpO2: 97% (04-30-19 @ 04:38) (95% - 100%)  Wt(kg): --  I&O's Summary    29 Apr 2019 07:01  -  30 Apr 2019 07:00  --------------------------------------------------------  IN: 1610 mL / OUT: 1000 mL / NET: 610 mL          JVP: Normal  Neck: supple  Lung: clear   CV: S1 S2 , Murmur:  Abd: soft  Ext: No edema  neuro: Awake / alert  Psych: flat affect  Skin: normal      LABS/DATA:    CARDIAC MARKERS:                                9.5    7.9   )-----------( 302      ( 29 Apr 2019 13:34 )             29.6           proBNP:   Lipid Profile:   HgA1c:   TSH:     TELE:  EKG:

## 2019-04-30 NOTE — PROGRESS NOTE ADULT - ASSESSMENT
HTN  stable   cont current meds    CAD history of stent  cont asa  statin     Mod as   stable     Anemia  Monitor hemoglobin, transfuse as needed.    DVT proph  on asa bid       follow up labs

## 2019-04-30 NOTE — DIETITIAN INITIAL EVALUATION ADULT. - ENERGY NEEDS
Height: 62 inches, Weight: 130 pounds (dosing)  BMI: 23.8 kg/m2 IBW: 110 pounds (+/-10%), %IBW: 118%  Pertinent Info: 1+ edema to R hip noted, no pressure injuries noted at this time in nursing flow sheet.  Other pertinent info: Pt is 73yoF per chart "w/ hx of seizure d/o, EtOH abuse, active 1 ppd smoker, CAD s/p stents (remote), HTN, HLD, R FN fx s/p CRPP (2017) c/b AVN s/p R USAMA (5/18) c/b vanc B2 PP Fx s/p ORIF/rev USAMA (6/18), c/b recurrent PP fx s/p ORIF (7/18), c/b MRSA infection s/p I&D (8/18) s/p extended course of IV abx via PICC and further suppressive PO abx. Patient finished course of PO abx late in 2018. Presenting today c/o worsening R leg pain to the point that she can no longer ambulate." s/p R hip YARI, I&D, antibiotics spacer placement, POD #7. s/p PICC.

## 2019-04-30 NOTE — PROGRESS NOTE ADULT - SUBJECTIVE AND OBJECTIVE BOX
Patient seen and examined. Pain controlled. No acute events overnight    HEALTH ISSUES - PROBLEM Dx:  Infection associated with internal right hip prosthesis, initial encounter: Infection associated with internal right hip prosthesis, initial encounter  LAI (acute kidney injury): LAI (acute kidney injury)  Prophylactic measure: Prophylactic measure  Chronic obstructive pulmonary disease, unspecified COPD type: Chronic obstructive pulmonary disease, unspecified COPD type  Alcohol abuse: Alcohol abuse  Coronary artery disease involving native coronary artery of native heart without angina pectoris: Coronary artery disease involving native coronary artery of native heart without angina pectoris  Aortic valve stenosis, etiology of cardiac valve disease unspecified: Aortic valve stenosis, etiology of cardiac valve disease unspecified  Shortness of breath: Shortness of breath  Pyogenic arthritis of right hip, due to unspecified organism: Pyogenic arthritis of right hip, due to unspecified organism        MEDICATIONS  (STANDING):  acetaminophen   Tablet .. 975 milliGRAM(s) Oral every 8 hours  amLODIPine   Tablet 10 milliGRAM(s) Oral daily  aspirin enteric coated 325 milliGRAM(s) Oral two times a day  calcium carbonate 1250 mG  + Vitamin D (OsCal 500 + D) 1 Tablet(s) Oral three times a day  chlorhexidine 4% Liquid 1 Application(s) Topical <User Schedule>  cloNIDine 0.1 milliGRAM(s) Oral two times a day  docusate sodium 100 milliGRAM(s) Oral three times a day  ferrous    sulfate 325 milliGRAM(s) Oral daily  folic acid 1 milliGRAM(s) Oral daily  labetalol 100 milliGRAM(s) Oral three times a day  lactated ringers. 1000 milliLiter(s) IV Continuous <Continuous>  lisinopril 10 milliGRAM(s) Oral daily  morphine ER Tablet 15 milliGRAM(s) Oral every 8 hours  multivitamin 1 Tablet(s) Oral daily  nicotine -  14 mG/24Hr(s) Patch 1 patch Transdermal daily  pantoprazole    Tablet 40 milliGRAM(s) Oral before breakfast  polyethylene glycol 3350 17 Gram(s) Oral daily  traMADol 50 milliGRAM(s) Oral every 8 hours  vancomycin  IVPB 750 milliGRAM(s) IV Intermittent every 12 hours    Allergies    No Known Allergies    Intolerances                            9.5    7.9   )-----------( 302      ( 29 Apr 2019 13:34 )             29.6             Vital Signs Last 24 Hrs  T(C): 36.4 (04-30-19 @ 04:38), Max: 36.8 (04-29-19 @ 09:26)  T(F): 97.6 (04-30-19 @ 04:38), Max: 98.2 (04-29-19 @ 09:26)  HR: 65 (04-30-19 @ 04:38) (64 - 69)  BP: 120/65 (04-30-19 @ 04:38) (101/61 - 121/69)  BP(mean): --  RR: 18 (04-30-19 @ 04:38) (18 - 18)  SpO2: 97% (04-30-19 @ 04:38) (95% - 100%)    Physical Exam  Gen: NAD  RLE:  Dressing c/d/i  +ehl/fhl/ta/gs function  L2-S1 silt  Dp/pt pulse intact  No calf ttp  Compartments soft    A/P:  73y Female sp R hip YARI, I&D and abx spacer placement POD#7  Pain control  DVT ppx  NWB RLE  FU labs  IV abx as per ID  Medical management appreciated  Incentive spirometry  Dispo planning

## 2019-04-30 NOTE — DIETITIAN INITIAL EVALUATION ADULT. - OTHER INFO
Pt seen for length of stay on [facility]. Pt reports good po intake on current admission > 7 days. No c/o nausea, vomiting, diarrhea, or constipation and denies chewing/swallowing difficulties. Pt reports a UBW of 130 lbs and reports no recent wt changes. However, per previous RD notes pt wt as follows: (05/22/2018) 114 pounds -> (08/31/2018) 123.4 pounds, and at time of 12/2018 RD assessment pt had reported a wt gain from 124 lbs to 136 lbs. Her dosing wt had been noted as 147.2 lbs on that admission (12/13/18), with a subsequent wt of 120.5 lbs noted as well (12/19/18). Current dosing wt noted as 130 lbs. Pt reports no nutrition related questions at this time, declines nutrition education.

## 2019-04-30 NOTE — DIETITIAN INITIAL EVALUATION ADULT. - PERTINENT MEDS FT
dulcolax, senna, miralax, colace, aluminum hydroxide, Mg hydroxide, simethicone, zofran, calcium carbonate + vitamin D, ferrous sulfate, folic acid, multivitamin

## 2019-05-01 LAB
APPEARANCE UR: CLEAR — SIGNIFICANT CHANGE UP
BILIRUB UR-MCNC: NEGATIVE — SIGNIFICANT CHANGE UP
COLOR SPEC: YELLOW — SIGNIFICANT CHANGE UP
DIFF PNL FLD: NEGATIVE — SIGNIFICANT CHANGE UP
GLUCOSE UR QL: NEGATIVE — SIGNIFICANT CHANGE UP
KETONES UR-MCNC: NEGATIVE — SIGNIFICANT CHANGE UP
LEUKOCYTE ESTERASE UR-ACNC: NEGATIVE — SIGNIFICANT CHANGE UP
NITRITE UR-MCNC: NEGATIVE — SIGNIFICANT CHANGE UP
PH UR: 6 — SIGNIFICANT CHANGE UP (ref 5–8)
PROT UR-MCNC: NEGATIVE — SIGNIFICANT CHANGE UP
SP GR SPEC: 1.02 — SIGNIFICANT CHANGE UP (ref 1.01–1.02)
UROBILINOGEN FLD QL: NEGATIVE — SIGNIFICANT CHANGE UP

## 2019-05-01 PROCEDURE — 73552 X-RAY EXAM OF FEMUR 2/>: CPT | Mod: 26,RT

## 2019-05-01 RX ADMIN — Medication 100 MILLIGRAM(S): at 21:49

## 2019-05-01 RX ADMIN — MORPHINE SULFATE 15 MILLIGRAM(S): 50 CAPSULE, EXTENDED RELEASE ORAL at 22:10

## 2019-05-01 RX ADMIN — Medication 975 MILLIGRAM(S): at 06:38

## 2019-05-01 RX ADMIN — LISINOPRIL 10 MILLIGRAM(S): 2.5 TABLET ORAL at 06:04

## 2019-05-01 RX ADMIN — Medication 1 PATCH: at 14:43

## 2019-05-01 RX ADMIN — MORPHINE SULFATE 15 MILLIGRAM(S): 50 CAPSULE, EXTENDED RELEASE ORAL at 06:04

## 2019-05-01 RX ADMIN — Medication 1 TABLET(S): at 14:42

## 2019-05-01 RX ADMIN — CHLORHEXIDINE GLUCONATE 1 APPLICATION(S): 213 SOLUTION TOPICAL at 06:06

## 2019-05-01 RX ADMIN — Medication 975 MILLIGRAM(S): at 21:49

## 2019-05-01 RX ADMIN — Medication 325 MILLIGRAM(S): at 18:02

## 2019-05-01 RX ADMIN — TRAMADOL HYDROCHLORIDE 50 MILLIGRAM(S): 50 TABLET ORAL at 23:30

## 2019-05-01 RX ADMIN — HYDROMORPHONE HYDROCHLORIDE 6 MILLIGRAM(S): 2 INJECTION INTRAMUSCULAR; INTRAVENOUS; SUBCUTANEOUS at 22:18

## 2019-05-01 RX ADMIN — Medication 100 MILLIGRAM(S): at 06:04

## 2019-05-01 RX ADMIN — Medication 0.1 MILLIGRAM(S): at 06:03

## 2019-05-01 RX ADMIN — Medication 975 MILLIGRAM(S): at 14:42

## 2019-05-01 RX ADMIN — HYDROMORPHONE HYDROCHLORIDE 6 MILLIGRAM(S): 2 INJECTION INTRAMUSCULAR; INTRAVENOUS; SUBCUTANEOUS at 12:38

## 2019-05-01 RX ADMIN — TRAMADOL HYDROCHLORIDE 50 MILLIGRAM(S): 50 TABLET ORAL at 14:42

## 2019-05-01 RX ADMIN — Medication 1 PATCH: at 07:30

## 2019-05-01 RX ADMIN — Medication 1 MILLIGRAM(S): at 12:03

## 2019-05-01 RX ADMIN — Medication 1 TABLET(S): at 12:03

## 2019-05-01 RX ADMIN — Medication 1 PATCH: at 13:00

## 2019-05-01 RX ADMIN — Medication 100 MILLIGRAM(S): at 16:35

## 2019-05-01 RX ADMIN — Medication 325 MILLIGRAM(S): at 12:04

## 2019-05-01 RX ADMIN — HYDROMORPHONE HYDROCHLORIDE 6 MILLIGRAM(S): 2 INJECTION INTRAMUSCULAR; INTRAVENOUS; SUBCUTANEOUS at 18:32

## 2019-05-01 RX ADMIN — Medication 250 MILLIGRAM(S): at 22:02

## 2019-05-01 RX ADMIN — HYDROMORPHONE HYDROCHLORIDE 6 MILLIGRAM(S): 2 INJECTION INTRAMUSCULAR; INTRAVENOUS; SUBCUTANEOUS at 12:08

## 2019-05-01 RX ADMIN — MORPHINE SULFATE 15 MILLIGRAM(S): 50 CAPSULE, EXTENDED RELEASE ORAL at 21:49

## 2019-05-01 RX ADMIN — MORPHINE SULFATE 15 MILLIGRAM(S): 50 CAPSULE, EXTENDED RELEASE ORAL at 15:12

## 2019-05-01 RX ADMIN — PANTOPRAZOLE SODIUM 40 MILLIGRAM(S): 20 TABLET, DELAYED RELEASE ORAL at 06:04

## 2019-05-01 RX ADMIN — HYDROMORPHONE HYDROCHLORIDE 6 MILLIGRAM(S): 2 INJECTION INTRAMUSCULAR; INTRAVENOUS; SUBCUTANEOUS at 18:02

## 2019-05-01 RX ADMIN — Medication 975 MILLIGRAM(S): at 06:08

## 2019-05-01 RX ADMIN — Medication 5 MILLIGRAM(S): at 16:32

## 2019-05-01 RX ADMIN — HYDROMORPHONE HYDROCHLORIDE 6 MILLIGRAM(S): 2 INJECTION INTRAMUSCULAR; INTRAVENOUS; SUBCUTANEOUS at 03:45

## 2019-05-01 RX ADMIN — Medication 1 TABLET(S): at 06:03

## 2019-05-01 RX ADMIN — MORPHINE SULFATE 15 MILLIGRAM(S): 50 CAPSULE, EXTENDED RELEASE ORAL at 14:41

## 2019-05-01 RX ADMIN — AMLODIPINE BESYLATE 10 MILLIGRAM(S): 2.5 TABLET ORAL at 06:03

## 2019-05-01 RX ADMIN — HYDROMORPHONE HYDROCHLORIDE 6 MILLIGRAM(S): 2 INJECTION INTRAMUSCULAR; INTRAVENOUS; SUBCUTANEOUS at 21:48

## 2019-05-01 RX ADMIN — Medication 325 MILLIGRAM(S): at 06:03

## 2019-05-01 RX ADMIN — Medication 975 MILLIGRAM(S): at 22:20

## 2019-05-01 RX ADMIN — Medication 1 TABLET(S): at 21:49

## 2019-05-01 RX ADMIN — MORPHINE SULFATE 15 MILLIGRAM(S): 50 CAPSULE, EXTENDED RELEASE ORAL at 06:38

## 2019-05-01 RX ADMIN — TRAMADOL HYDROCHLORIDE 50 MILLIGRAM(S): 50 TABLET ORAL at 15:12

## 2019-05-01 RX ADMIN — Medication 5 MILLIGRAM(S): at 07:50

## 2019-05-01 RX ADMIN — Medication 975 MILLIGRAM(S): at 15:12

## 2019-05-01 RX ADMIN — TRAMADOL HYDROCHLORIDE 50 MILLIGRAM(S): 50 TABLET ORAL at 06:38

## 2019-05-01 RX ADMIN — HYDROMORPHONE HYDROCHLORIDE 6 MILLIGRAM(S): 2 INJECTION INTRAMUSCULAR; INTRAVENOUS; SUBCUTANEOUS at 03:14

## 2019-05-01 RX ADMIN — TRAMADOL HYDROCHLORIDE 50 MILLIGRAM(S): 50 TABLET ORAL at 06:04

## 2019-05-01 RX ADMIN — Medication 250 MILLIGRAM(S): at 08:44

## 2019-05-01 NOTE — PROGRESS NOTE ADULT - SUBJECTIVE AND OBJECTIVE BOX
Subjective: Patient seen and examined. No new events except as noted.     SUBJECTIVE/ROS:  feels ok       MEDICATIONS:  MEDICATIONS  (STANDING):  acetaminophen   Tablet .. 975 milliGRAM(s) Oral every 8 hours  amLODIPine   Tablet 10 milliGRAM(s) Oral daily  aspirin enteric coated 325 milliGRAM(s) Oral two times a day  calcium carbonate 1250 mG  + Vitamin D (OsCal 500 + D) 1 Tablet(s) Oral three times a day  chlorhexidine 4% Liquid 1 Application(s) Topical <User Schedule>  cloNIDine 0.1 milliGRAM(s) Oral two times a day  docusate sodium 100 milliGRAM(s) Oral three times a day  ferrous    sulfate 325 milliGRAM(s) Oral daily  folic acid 1 milliGRAM(s) Oral daily  labetalol 100 milliGRAM(s) Oral three times a day  lactated ringers. 1000 milliLiter(s) (75 mL/Hr) IV Continuous <Continuous>  lisinopril 10 milliGRAM(s) Oral daily  morphine ER Tablet 15 milliGRAM(s) Oral every 8 hours  multivitamin 1 Tablet(s) Oral daily  nicotine -  14 mG/24Hr(s) Patch 1 patch Transdermal daily  pantoprazole    Tablet 40 milliGRAM(s) Oral before breakfast  polyethylene glycol 3350 17 Gram(s) Oral daily  traMADol 50 milliGRAM(s) Oral every 8 hours  vancomycin  IVPB 750 milliGRAM(s) IV Intermittent every 12 hours      PHYSICAL EXAM:  T(C): 36.6 (05-01-19 @ 04:23), Max: 36.9 (04-30-19 @ 16:29)  HR: 61 (05-01-19 @ 04:23) (61 - 69)  BP: 118/60 (05-01-19 @ 04:23) (118/60 - 145/68)  RR: 18 (05-01-19 @ 04:23) (18 - 18)  SpO2: 99% (05-01-19 @ 04:23) (97% - 100%)  Wt(kg): --  I&O's Summary    30 Apr 2019 07:01  -  01 May 2019 07:00  --------------------------------------------------------  IN: 2080 mL / OUT: 1625 mL / NET: 455 mL    01 May 2019 07:01  -  01 May 2019 12:47  --------------------------------------------------------  IN: 0 mL / OUT: 150 mL / NET: -150 mL            JVP: Normal  Neck: supple  Lung: clear   CV: S1 S2 , Murmur:  Abd: soft  Ext: No edema  neuro: Awake / alert  Psych: flat affect  Skin: normal``    LABS/DATA:    CARDIAC MARKERS:                                9.5    7.9   )-----------( 302      ( 29 Apr 2019 13:34 )             29.6           proBNP:   Lipid Profile:   HgA1c:   TSH:     TELE:  EKG:

## 2019-05-01 NOTE — PROGRESS NOTE ADULT - ASSESSMENT
HTN  stable   cont current meds    CAD history of stent  cont asa  statin     Mod as   stable     Anemia  stable     DVT proph  on asa bid       follow up labs

## 2019-05-01 NOTE — PROGRESS NOTE ADULT - SUBJECTIVE AND OBJECTIVE BOX
Orthopaedic Surgery Progress Note    Subjective:   Patient seen and examined  No acute events overnight  Patient says she had increased pain after attempting to ambulate to bathroom    Objective:  T(C): 36.6 (05-01-19 @ 04:23), Max: 36.9 (04-30-19 @ 09:52)  HR: 61 (05-01-19 @ 04:23) (61 - 69)  BP: 118/60 (05-01-19 @ 04:23) (108/63 - 145/68)  RR: 18 (05-01-19 @ 04:23) (18 - 18)  SpO2: 99% (05-01-19 @ 04:23) (97% - 100%)    04-29 @ 07:01  -  04-30 @ 07:00  --------------------------------------------------------  IN: 1610 mL / OUT: 1000 mL / NET: 610 mL    04-30 @ 07:01  -  05-01 @ 06:24  --------------------------------------------------------  IN: 2080 mL / OUT: 1625 mL / NET: 455 mL    PE  NAD  RLE:   dressing C/D/I  motor intact GS/TA/EHL  SILT S/S/SP/DP  WWP                        9.5    7.9   )-----------( 302      ( 29 Apr 2019 13:34 )             29.6     A/P:  73y Female sp R hip YARI, I&D and abx spacer placement POD#8    Pain control  DVT ppx  NWB RLE  FU rpt XR R femur  FU labs  IV abx as per ID  Medical management appreciated  Incentive spirometry  Dispo planning    Deni Lakhani MD

## 2019-05-02 ENCOUNTER — TRANSCRIPTION ENCOUNTER (OUTPATIENT)
Age: 74
End: 2019-05-02

## 2019-05-02 VITALS
RESPIRATION RATE: 18 BRPM | SYSTOLIC BLOOD PRESSURE: 99 MMHG | TEMPERATURE: 98 F | OXYGEN SATURATION: 95 % | HEART RATE: 63 BPM | DIASTOLIC BLOOD PRESSURE: 59 MMHG

## 2019-05-02 PROCEDURE — 85610 PROTHROMBIN TIME: CPT

## 2019-05-02 PROCEDURE — 85730 THROMBOPLASTIN TIME PARTIAL: CPT

## 2019-05-02 PROCEDURE — 82947 ASSAY GLUCOSE BLOOD QUANT: CPT

## 2019-05-02 PROCEDURE — 84295 ASSAY OF SERUM SODIUM: CPT

## 2019-05-02 PROCEDURE — 86901 BLOOD TYPING SEROLOGIC RH(D): CPT

## 2019-05-02 PROCEDURE — 73701 CT LOWER EXTREMITY W/DYE: CPT

## 2019-05-02 PROCEDURE — 93312 ECHO TRANSESOPHAGEAL: CPT

## 2019-05-02 PROCEDURE — 86850 RBC ANTIBODY SCREEN: CPT

## 2019-05-02 PROCEDURE — 99285 EMERGENCY DEPT VISIT HI MDM: CPT | Mod: 25

## 2019-05-02 PROCEDURE — 82803 BLOOD GASES ANY COMBINATION: CPT

## 2019-05-02 PROCEDURE — 82330 ASSAY OF CALCIUM: CPT

## 2019-05-02 PROCEDURE — 96374 THER/PROPH/DIAG INJ IV PUSH: CPT | Mod: XU

## 2019-05-02 PROCEDURE — 73501 X-RAY EXAM HIP UNI 1 VIEW: CPT

## 2019-05-02 PROCEDURE — 97530 THERAPEUTIC ACTIVITIES: CPT

## 2019-05-02 PROCEDURE — 86923 COMPATIBILITY TEST ELECTRIC: CPT

## 2019-05-02 PROCEDURE — P9011: CPT

## 2019-05-02 PROCEDURE — 86803 HEPATITIS C AB TEST: CPT

## 2019-05-02 PROCEDURE — 87075 CULTR BACTERIA EXCEPT BLOOD: CPT

## 2019-05-02 PROCEDURE — 36569 INSJ PICC 5 YR+ W/O IMAGING: CPT

## 2019-05-02 PROCEDURE — 97535 SELF CARE MNGMENT TRAINING: CPT

## 2019-05-02 PROCEDURE — C1776: CPT

## 2019-05-02 PROCEDURE — 96376 TX/PRO/DX INJ SAME DRUG ADON: CPT

## 2019-05-02 PROCEDURE — 80048 BASIC METABOLIC PNL TOTAL CA: CPT

## 2019-05-02 PROCEDURE — 82435 ASSAY OF BLOOD CHLORIDE: CPT

## 2019-05-02 PROCEDURE — 84484 ASSAY OF TROPONIN QUANT: CPT

## 2019-05-02 PROCEDURE — 87070 CULTURE OTHR SPECIMN AEROBIC: CPT

## 2019-05-02 PROCEDURE — 87040 BLOOD CULTURE FOR BACTERIA: CPT

## 2019-05-02 PROCEDURE — 86140 C-REACTIVE PROTEIN: CPT

## 2019-05-02 PROCEDURE — 87186 SC STD MICRODIL/AGAR DIL: CPT

## 2019-05-02 PROCEDURE — 80053 COMPREHEN METABOLIC PANEL: CPT

## 2019-05-02 PROCEDURE — 82565 ASSAY OF CREATININE: CPT

## 2019-05-02 PROCEDURE — C1751: CPT

## 2019-05-02 PROCEDURE — 93005 ELECTROCARDIOGRAM TRACING: CPT

## 2019-05-02 PROCEDURE — C1713: CPT

## 2019-05-02 PROCEDURE — 81003 URINALYSIS AUTO W/O SCOPE: CPT

## 2019-05-02 PROCEDURE — 71045 X-RAY EXAM CHEST 1 VIEW: CPT

## 2019-05-02 PROCEDURE — 73552 X-RAY EXAM OF FEMUR 2/>: CPT

## 2019-05-02 PROCEDURE — 36430 TRANSFUSION BLD/BLD COMPNT: CPT

## 2019-05-02 PROCEDURE — P9016: CPT

## 2019-05-02 PROCEDURE — 85014 HEMATOCRIT: CPT

## 2019-05-02 PROCEDURE — 84132 ASSAY OF SERUM POTASSIUM: CPT

## 2019-05-02 PROCEDURE — 72170 X-RAY EXAM OF PELVIS: CPT

## 2019-05-02 PROCEDURE — 86900 BLOOD TYPING SEROLOGIC ABO: CPT

## 2019-05-02 PROCEDURE — 97162 PT EVAL MOD COMPLEX 30 MIN: CPT

## 2019-05-02 PROCEDURE — 93926 LOWER EXTREMITY STUDY: CPT

## 2019-05-02 PROCEDURE — 93971 EXTREMITY STUDY: CPT

## 2019-05-02 PROCEDURE — 96375 TX/PRO/DX INJ NEW DRUG ADDON: CPT

## 2019-05-02 PROCEDURE — 80202 ASSAY OF VANCOMYCIN: CPT

## 2019-05-02 PROCEDURE — 97116 GAIT TRAINING THERAPY: CPT

## 2019-05-02 PROCEDURE — 97110 THERAPEUTIC EXERCISES: CPT

## 2019-05-02 PROCEDURE — 97167 OT EVAL HIGH COMPLEX 60 MIN: CPT

## 2019-05-02 PROCEDURE — 93306 TTE W/DOPPLER COMPLETE: CPT

## 2019-05-02 PROCEDURE — 85027 COMPLETE CBC AUTOMATED: CPT

## 2019-05-02 PROCEDURE — 83605 ASSAY OF LACTIC ACID: CPT

## 2019-05-02 RX ADMIN — Medication 1 TABLET(S): at 05:32

## 2019-05-02 RX ADMIN — Medication 1 PATCH: at 08:04

## 2019-05-02 RX ADMIN — HYDROMORPHONE HYDROCHLORIDE 6 MILLIGRAM(S): 2 INJECTION INTRAMUSCULAR; INTRAVENOUS; SUBCUTANEOUS at 06:02

## 2019-05-02 RX ADMIN — Medication 975 MILLIGRAM(S): at 15:30

## 2019-05-02 RX ADMIN — MORPHINE SULFATE 15 MILLIGRAM(S): 50 CAPSULE, EXTENDED RELEASE ORAL at 13:30

## 2019-05-02 RX ADMIN — Medication 1 PATCH: at 13:30

## 2019-05-02 RX ADMIN — HYDROMORPHONE HYDROCHLORIDE 6 MILLIGRAM(S): 2 INJECTION INTRAMUSCULAR; INTRAVENOUS; SUBCUTANEOUS at 15:00

## 2019-05-02 RX ADMIN — Medication 975 MILLIGRAM(S): at 15:00

## 2019-05-02 RX ADMIN — Medication 325 MILLIGRAM(S): at 13:30

## 2019-05-02 RX ADMIN — Medication 5 MILLIGRAM(S): at 00:48

## 2019-05-02 RX ADMIN — TRAMADOL HYDROCHLORIDE 50 MILLIGRAM(S): 50 TABLET ORAL at 00:00

## 2019-05-02 RX ADMIN — MORPHINE SULFATE 15 MILLIGRAM(S): 50 CAPSULE, EXTENDED RELEASE ORAL at 14:00

## 2019-05-02 RX ADMIN — MORPHINE SULFATE 15 MILLIGRAM(S): 50 CAPSULE, EXTENDED RELEASE ORAL at 05:32

## 2019-05-02 RX ADMIN — Medication 975 MILLIGRAM(S): at 06:00

## 2019-05-02 RX ADMIN — Medication 5 MILLIGRAM(S): at 08:48

## 2019-05-02 RX ADMIN — HYDROMORPHONE HYDROCHLORIDE 6 MILLIGRAM(S): 2 INJECTION INTRAMUSCULAR; INTRAVENOUS; SUBCUTANEOUS at 05:32

## 2019-05-02 RX ADMIN — Medication 325 MILLIGRAM(S): at 05:42

## 2019-05-02 RX ADMIN — Medication 250 MILLIGRAM(S): at 10:25

## 2019-05-02 RX ADMIN — Medication 1 MILLIGRAM(S): at 13:30

## 2019-05-02 RX ADMIN — MORPHINE SULFATE 15 MILLIGRAM(S): 50 CAPSULE, EXTENDED RELEASE ORAL at 06:00

## 2019-05-02 RX ADMIN — HYDROMORPHONE HYDROCHLORIDE 6 MILLIGRAM(S): 2 INJECTION INTRAMUSCULAR; INTRAVENOUS; SUBCUTANEOUS at 15:30

## 2019-05-02 RX ADMIN — CHLORHEXIDINE GLUCONATE 1 APPLICATION(S): 213 SOLUTION TOPICAL at 05:34

## 2019-05-02 RX ADMIN — Medication 1 TABLET(S): at 15:00

## 2019-05-02 RX ADMIN — Medication 975 MILLIGRAM(S): at 05:42

## 2019-05-02 RX ADMIN — Medication 1 TABLET(S): at 13:30

## 2019-05-02 NOTE — PROGRESS NOTE ADULT - ATTENDING COMMENTS
Advanced care planning was discussed with patient and family.  Risks, benefits and alternatives of the cardiac treatments and medical therapy including procedures were discussed in detail and all questions were answered. 30 minutes face to face spent.

## 2019-05-02 NOTE — PROGRESS NOTE ADULT - SUBJECTIVE AND OBJECTIVE BOX
Orthopaedic Surgery Progress Note    Subjective:   Patient seen and examined  No acute events overnight  Pain controlled.     Vital Signs Last 24 Hrs  T(C): 36.6 (02 May 2019 05:09), Max: 36.9 (01 May 2019 13:49)  T(F): 97.8 (02 May 2019 05:09), Max: 98.5 (01 May 2019 13:49)  HR: 66 (02 May 2019 05:09) (66 - 70)  BP: 101/64 (02 May 2019 05:25) (94/54 - 113/62)  BP(mean): --  RR: 18 (02 May 2019 05:09) (16 - 18)  SpO2: 97% (02 May 2019 05:09) (96% - 100%)    PE  NAD  RLE:   foam tape dressing C/D/I  motor intact GS/TA/EHL  SILT S/S/SP/DP  WWP                      A/P:  73y Female sp R hip YARI, I&D and abx spacer placement POD#9    Pain control  DVT ppx  NWB RLE  FU labs  IV abx as per ID  Medical management appreciated  Incentive spirometry  Dispo planning

## 2019-05-02 NOTE — PROGRESS NOTE ADULT - SUBJECTIVE AND OBJECTIVE BOX
Subjective: Patient seen and examined. No new events except as noted.     SUBJECTIVE/ROS:  feels ok       MEDICATIONS:  MEDICATIONS  (STANDING):  acetaminophen   Tablet .. 975 milliGRAM(s) Oral every 8 hours  amLODIPine   Tablet 10 milliGRAM(s) Oral daily  aspirin enteric coated 325 milliGRAM(s) Oral two times a day  calcium carbonate 1250 mG  + Vitamin D (OsCal 500 + D) 1 Tablet(s) Oral three times a day  chlorhexidine 4% Liquid 1 Application(s) Topical <User Schedule>  cloNIDine 0.1 milliGRAM(s) Oral two times a day  docusate sodium 100 milliGRAM(s) Oral three times a day  ferrous    sulfate 325 milliGRAM(s) Oral daily  folic acid 1 milliGRAM(s) Oral daily  labetalol 100 milliGRAM(s) Oral three times a day  lactated ringers. 1000 milliLiter(s) (75 mL/Hr) IV Continuous <Continuous>  lisinopril 10 milliGRAM(s) Oral daily  morphine ER Tablet 15 milliGRAM(s) Oral every 8 hours  multivitamin 1 Tablet(s) Oral daily  nicotine -  14 mG/24Hr(s) Patch 1 patch Transdermal daily  pantoprazole    Tablet 40 milliGRAM(s) Oral before breakfast  polyethylene glycol 3350 17 Gram(s) Oral daily  traMADol 50 milliGRAM(s) Oral every 8 hours  vancomycin  IVPB 750 milliGRAM(s) IV Intermittent every 12 hours      PHYSICAL EXAM:  T(C): 36.6 (05-02-19 @ 09:09), Max: 36.7 (05-01-19 @ 21:03)  HR: 63 (05-02-19 @ 09:09) (63 - 70)  BP: 99/59 (05-02-19 @ 09:09) (99/57 - 113/62)  RR: 18 (05-02-19 @ 09:09) (16 - 18)  SpO2: 95% (05-02-19 @ 09:09) (95% - 100%)  Wt(kg): --  I&O's Summary    01 May 2019 07:01  -  02 May 2019 07:00  --------------------------------------------------------  IN: 1110 mL / OUT: 1350 mL / NET: -240 mL    02 May 2019 07:01  -  02 May 2019 15:08  --------------------------------------------------------  IN: 0 mL / OUT: 300 mL / NET: -300 mL            JVP: Normal  Neck: supple  Lung: clear   CV: S1 S2 , Murmur:  Abd: soft  Ext: No edema  neuro: Awake / alert  Psych: flat affect  Skin: normal``    LABS/DATA:    CARDIAC MARKERS:                  proBNP:   Lipid Profile:   HgA1c:   TSH:     TELE:  EKG:

## 2019-05-02 NOTE — DISCHARGE NOTE NURSING/CASE MANAGEMENT/SOCIAL WORK - NSDCDPATPORTLINK_GEN_ALL_CORE
You can access the [facility] Portal, offered by [facility], by registering with the following website: [email or web address]

## 2019-05-02 NOTE — PROGRESS NOTE ADULT - REASON FOR ADMISSION
R hip/femur infected hardware

## 2019-05-02 NOTE — PROGRESS NOTE ADULT - ASSESSMENT
HTN  stable   cont current meds    CAD history of stent  cont asa  statin     Mod as   stable     Anemia  stable     DVT proph  on asa bid     DC planning as per primary team

## 2019-05-03 NOTE — CONSULT NOTE ADULT - SUBJECTIVE AND OBJECTIVE BOX
05/03/19 7882 Wound Leg Lower Right;Lateral  
Date First Assessed/Time First Assessed: 11/14/18 0830   POA: Yes  Wound Type: Pressure injury  Location: Leg Lower  Orientation: Right;Lateral  
Dressing Status  Removed Dressing Type  Unna boot Wound Length (cm) 2.1 cm Wound Width (cm) 0.8 cm Wound Depth (cm) 0.1 Wound Surface area (cm^2) 1.68 cm^2 Change in Wound Size % 97.08 Condition of Base Granulation;Slough Condition of Edges Open Drainage Amount  Moderate Drainage Color Serosanguinous Wound Odor None Periwound Skin Condition Intact Cleansing and Cleansing Agents  Normal saline; Soap and water Visit Vitals /79 Pulse 86 Temp 97 °F (36.1 °C) Resp 16 RLE Peripheral Vascular Capillary Refill: Less than/equal to 3 seconds (05/03/19 0813) Color: Appropriate for race (05/03/19 0813) Temperature: Warm (05/03/19 0813) Sensation: Present (05/03/19 0813) Pedal Pulse: Palpable (05/03/19 0813) Circumference of Calf (cm): 41 cm (05/03/19 0813) Location of Measurement (Calf): Mid  (05/03/19 0813) Circumference of Ankle (cm): 22 cm (05/03/19 0813) Location of Measurement (Ankle): Upper  (05/03/19 0813) HPI:  73F PMH seizure disorder, EtOH abuse, CAD w/ stent, R THR (2/2 to right hip fracture) course c/b periprostetic fracture in July 2018 with ORIF on 7/19/18, subsequent MRSA infection of R thigh on 8/2018 with I&D placed on long term antibiotics (hip not removed at this time 2/2 to fracture having not healed) now presenting with R hip pain and difficulty ambulating.      Vital Signs Last 24 Hrs  T(C): 36.9 (21 Apr 2019 19:22), Max: 37.1 (21 Apr 2019 16:30)  T(F): 98.5 (21 Apr 2019 19:22), Max: 98.8 (21 Apr 2019 18:31)  HR: 95 (21 Apr 2019 19:22) (60 - 95)  BP: 178/70 (21 Apr 2019 19:22) (121/68 - 183/82)  BP(mean): 94 (21 Apr 2019 18:31) (94 - 94)  RR: 16 (21 Apr 2019 19:22) (16 - 18)  SpO2: 95% (21 Apr 2019 19:22) (95% - 99%)      04-21    135  |  103  |  44<H>  ----------------------------<  94  3.9   |  17<L>  |  1.11    Ca    9.1      21 Apr 2019 16:10    TPro  7.8  /  Alb  3.0<L>  /  TBili  0.3  /  DBili  x   /  AST  25  /  ALT  16  /  AlkPhos  108  04-21      PT/INR - ( 21 Apr 2019 21:29 )   PT: 13.4 sec;   INR: 1.17 ratio         PTT - ( 21 Apr 2019 21:29 )  PTT:26.2 sec                                        11.8   13.2  )-----------( 405      ( 21 Apr 2019 16:10 )             36.2     CAPILLARY BLOOD GLUCOSE HCP: Brittni Castro [phone]    OUTPATIENT PROVIDERS  Dr. Navdeep Villafuerte (PMD)  Dr. Delong (Cards)      HPI:  73F PMH seizure disorder, EtOH abuse, CAD w/ stent, R THR (2/2 to right hip fracture) course c/b periprostetic fracture in July 2018 with ORIF on 7/19/18, subsequent MRSA infection of R thigh on 8/2018 with I&D placed on long term antibiotics (hip not removed at this time 2/2 to fracture having not healed) now presenting with R hip pain and difficulty ambulating.  Pt last seen by Dr. Be on 2/19 in the office for worsening pain at which time doxycycline 100 mg BID was continued for one more month.  Pt states she finished this script mid-March and since then has been off antibiotics.  Pt presents to ED today for worsening hip pain, fluctuance in right hip today.  She was seen by orthopedic surgery in ED with plans for girdle procedure.  Medicine called today for pre-op clearance.    At time of exam, pt endorses fevers and chills, denies nausea.  States she was having diarrhea a few days ago and started lomotil given to her by her primary care physician.  Last BM was 3 days ago.  Endorsed hip pain that feels like aching on right hip with inability to walk.  Pt denies taking any narcotics for this pain recently.    Regarding cardiac history, pt states she had 3 stents to her health 3 years ago.  States she is on metoprolol, however this is not found in her medications prescribed.  Pt sleeps with 5-6 pillows at night due to troubles breathing for the last year, cannot walk multiple blocks 2/2 to pain.  Pt walks with a cane.    REVIEW OF SYSTEMS:  General:  endorses weight loss  EYES/ENT: No visual changes;  No vertigo or throat pain   NECK: No pain or stiffness  RESPIRATORY: Endorses cough and rhinorrhea.  CARDIOVASCULAR: No chest pain or palpitations  GASTROINTESTINAL: No abdominal or epigastric pain. No nausea, vomiting, or hematemesis; No diarrhea or constipation. No melena or hematochezia.  GENITOURINARY: No dysuria, frequency or hematuria  NEUROLOGICAL: No numbness or weakness  SKIN: See HPI  MSK:  See HPI        Home Medications:  ACETAMINOPHEN-COD #4 TABLET:  (21 Apr 2019 23:15)  amLODIPine 10 mg oral tablet: 1 tab(s) orally once a day (21 Apr 2019 23:15)  aspirin 81 mg oral delayed release tablet: 1 tab(s) orally once a day (21 Apr 2019 23:15)  cloNIDine 0.1 mg oral tablet: 1 tab(s) orally once a day (21 Apr 2019 23:15)  DIPHENOXYLATE-ATROP 2.5-0.025:  (21 Apr 2019 23:15)  lisinopril 10 mg oral tablet: 1 tab(s) orally once a day (21 Apr 2019 23:15)  metoprolol?    PAST MEDICAL & SURGICAL HISTORY:  Pain of right hip joint  Migraine  Alcohol abuse  Seizure  Stented coronary artery  Coronary artery disease  Anxiety  HTN (hypertension)  Emphysema, unspecified  Anxiety  Migraine  CAD (coronary artery disease)  Hyperlipidemia  Hypertension  Status post total hip replacement, right: 5/21/18  S/P bladder repair    Allergies    No Known Allergies    Intolerances        Vital Signs Last 24 Hrs  T(C): 36.9 (21 Apr 2019 19:22), Max: 37.1 (21 Apr 2019 16:30)  T(F): 98.5 (21 Apr 2019 19:22), Max: 98.8 (21 Apr 2019 18:31)  HR: 95 (21 Apr 2019 19:22) (60 - 95)  BP: 178/70 (21 Apr 2019 19:22) (121/68 - 183/82)  BP(mean): 94 (21 Apr 2019 18:31) (94 - 94)  RR: 16 (21 Apr 2019 19:22) (16 - 18)  SpO2: 95% (21 Apr 2019 19:22) (95% - 99%)    Appearance: Laying in bed, falling asleep.  HEENT:   Dry oral mucosa, PEERL, EOMI  Lymphatic: No LAD noted in cervical nodes  Cardiovascular: RRR, No murmurs, gallops or rubs appreciated  Respiratory: Lungs with expiratory wheezes noted, distant breath sounds  Gastrointestinal:  Soft, nondistended, nontender, +BS	  Skin: right hip with well healed surgical scar, erythema and warmth on palpation with fluctuance 3cm area noted at hip  Neurologic: AOx3, CNII-XII grossly intact  Extremities: Moving all extremities equally; moving lower extremites less 2/2 to pain  Vascular: palpable dp, pt and radial pulses 2+ bilaterally  2+ edema noted.  Psych: Anxious.    04-21    135  |  103  |  44<H>  ----------------------------<  94  3.9   |  17<L>  |  1.11    Ca    9.1      21 Apr 2019 16:10    TPro  7.8  /  Alb  3.0<L>  /  TBili  0.3  /  DBili  x   /  AST  25  /  ALT  16  /  AlkPhos  108  04-21      PT/INR - ( 21 Apr 2019 21:29 )   PT: 13.4 sec;   INR: 1.17 ratio         PTT - ( 21 Apr 2019 21:29 )  PTT:26.2 sec                                        11.8   13.2  )-----------( 405      ( 21 Apr 2019 16:10 )             36.2     CAPILLARY BLOOD GLUCOSE    < from: Transthoracic Echocardiogram (05.16.18 @ 07:30) >  ------------------------------------------------------------------------  PROCEDURE: Transthoracic echocardiogram with 2-D, M-Mode  and complete spectral and color flow Doppler.  INDICATION: Palpitations (R00.2)  ------------------------------------------------------------------------  DIMENSIONS:  Dimensions:     Normal Values:  LA:     3.8 cm    2.0 - 4.0 cm  Ao:     2.4 cm    2.0 - 3.8 cm  SEPTUM: 0.6 cm    0.6 - 1.2 cm  PWT:    0.7 cm    0.6 - 1.1 cm  LVIDd:  4.7 cm    3.0 - 5.6 cm  LVIDs:  3.0 cm    1.8 - 4.0 cm  Derived Variables:  LVMI: 64 g/m2  RWT: 0.29  Fractional short: 36 %  Ejection Fraction (Marandatz): 66 %  ------------------------------------------------------------------------  OBSERVATIONS:  Mitral Valve: Mitral annular calcification, otherwise  normal mitral valve. Mild mitral regurgitation.  Aortic Root: Normal aortic root.  Aortic Valve: Aortic valve leaflet morphology not well  visualized.  The valve is calcified. Peak transaortic valve  gradient equals 26 mm Hg, mean transaortic valve gradient  equals 12 mm Hg, consistent with probable mild to moderate  aortic stenosis. Mild aortic regurgitation.  Left Atrium: Mildly dilated left atrium. LA volume index =  41 cc/m2.  Left Ventricle: Normal left ventricular systolic function.  No segmental wall motion abnormalities. Normal left  ventricular internal dimensions and wall thicknesses.  Right Heart: Normal right atrium. Normal right ventricular  size and function. Normal tricuspid valve. Mild tricuspid  regurgitation. Normal pulmonic valve. Minimal pulmonic  regurgitation.  Pericardium/PleuraNormal pericardium with no pericardial  effusion.  Hemodynamic: Estimated right ventricular systolic pressure  equals 43 mm Hg, assuming right atrial pressure equals 10  mm Hg, consistent with mild pulmonary hypertension.  ------------------------------------------------------------------------  CONCLUSIONS:  1. Mitral annular calcification, otherwise normal mitral  valve. Mild mitral regurgitation.  2. Aortic valve leaflet morphology not well visualized.  The valve is calcified. Peak transaortic valve gradient  equals 26 mm Hg, mean transaortic valve gradient equals 12  mm Hg, consistentwith probable mild to moderate aortic  stenosis. Mild aortic regurgitation.  3. Mildly dilated left atrium.  LA volume index = 41 cc/m2.  4. Normal left ventricular internal dimensions and wall  thicknesses.  5. Normal left ventricular systolic function. No segmental  wall motion abnormalities.  6. Normal right ventricular size and function.  7. Estimated right ventricular systolic pressure equals 43  mm Hg, assuming right atrial pressure equals 10 mm Hg,  consistent with mild pulmonary hypertension.  ------------------------------------------------------------------------  Confirmed on  5/16/2018 - 10:40:17 by Prashant Hussein M.D.    < end of copied text >    EKG reviewed:  NSR, no arrythmia, notching of p-wave in lead II. HCP: Brittni Castro [phone]    OUTPATIENT PROVIDERS  Dr. Navdeep Villafuerte (PMD)  Dr. Delong (Cards)      HPI:  73F PMH seizure disorder (1 event in remote past, not on meds), EtOH abuse, CAD w/ stent, R THR (2/2 to right hip fracture in 2015 with revision to THR in 2016) course c/b periprostetic fracture in July 2018 with ORIF on 7/19/18, subsequent MRSA infection of R thigh on 8/2018 with I&D placed on long term antibiotics (hip not removed at this time 2/2 to fracture having not healed) now presenting with R hip pain and difficulty ambulating.  Pt was admitted in Dec 2018 for hip pain, at which time pt was discharged with 1 month of abx which finished mid-Jan.  Pt last seen by Dr. Be on 2/19 in the office for worsening pain at which time doxycycline 100 mg BID was continued for one more month.  Pt states she finished this script mid-March and since then has been off antibiotics.  Pt presents to ED today for worsening hip pain, fluctuance in right hip today.  She was seen by orthopedic surgery in ED with plans for girdle procedure.  Medicine called today for pre-op clearance.    At time of exam, pt endorses fevers and chills, denies nausea.  States she was having diarrhea a few days ago and started lomotil given to her by her primary care physician.  Last BM was 3 days ago.  Endorsed hip pain that feels like aching on right hip with inability to walk.  Pt denies taking any narcotics for this pain recently.    Regarding cardiac history, pt states she had 3 stents to her health 10 years ago.  States she is on metoprolol, however this is not found in her medications prescribed.  Pt sleeps with 5-6 pillows at night due to troubles breathing for the last year, cannot walk multiple blocks 2/2 to pain.  Pt walks with a cane.    REVIEW OF SYSTEMS:  General:  endorses weight loss  EYES/ENT: No visual changes;  No vertigo or throat pain   NECK: No pain or stiffness  RESPIRATORY: Endorses cough and rhinorrhea.  CARDIOVASCULAR: No chest pain or palpitations  GASTROINTESTINAL: No abdominal or epigastric pain. No nausea, vomiting, or hematemesis; No diarrhea or constipation. No melena or hematochezia.  GENITOURINARY: No dysuria, frequency or hematuria  NEUROLOGICAL: No numbness or weakness  SKIN: See HPI  MSK:  See HPI        Home Medications:  ACETAMINOPHEN-COD #4 TABLET:  (21 Apr 2019 23:15)  amLODIPine 10 mg oral tablet: 1 tab(s) orally once a day (21 Apr 2019 23:15)  aspirin 81 mg oral delayed release tablet: 1 tab(s) orally once a day (21 Apr 2019 23:15)  cloNIDine 0.1 mg oral tablet: 1 tab(s) orally once a day (21 Apr 2019 23:15)  DIPHENOXYLATE-ATROP 2.5-0.025:  (21 Apr 2019 23:15)  lisinopril 10 mg oral tablet: 1 tab(s) orally once a day (21 Apr 2019 23:15)  metoprolol?    PAST MEDICAL & SURGICAL HISTORY:  Pain of right hip joint  Migraine  Alcohol abuse  Seizure  Stented coronary artery  Coronary artery disease  Anxiety  HTN (hypertension)  Emphysema, unspecified  Anxiety  Migraine  CAD (coronary artery disease)  Hyperlipidemia  Hypertension  Status post total hip replacement, right: 5/21/18  S/P bladder repair    Social History:  Pt lives at home with  who has had a stroke, adult son and his gf and 7 yo grandchild.    Allergies    No Known Allergies    Intolerances        Vital Signs Last 24 Hrs  T(C): 36.9 (21 Apr 2019 19:22), Max: 37.1 (21 Apr 2019 16:30)  T(F): 98.5 (21 Apr 2019 19:22), Max: 98.8 (21 Apr 2019 18:31)  HR: 95 (21 Apr 2019 19:22) (60 - 95)  BP: 178/70 (21 Apr 2019 19:22) (121/68 - 183/82)  BP(mean): 94 (21 Apr 2019 18:31) (94 - 94)  RR: 16 (21 Apr 2019 19:22) (16 - 18)  SpO2: 95% (21 Apr 2019 19:22) (95% - 99%)    Appearance: Laying in bed, falling asleep.  HEENT:   Dry oral mucosa, PEERL, EOMI  Lymphatic: No LAD noted in cervical nodes  Cardiovascular: RRR, No murmurs, gallops or rubs appreciated  Respiratory: Lungs with expiratory wheezes noted, distant breath sounds  Gastrointestinal:  Soft, nondistended, nontender, +BS	  Skin: right hip with well healed surgical scar, erythema and warmth on palpation with fluctuance 3cm area noted at hip  Neurologic: AOx3, CNII-XII grossly intact  Extremities: Moving all extremities equally; moving lower extremites less 2/2 to pain  Vascular: palpable dp, pt and radial pulses 2+ bilaterally  2+ edema noted.  Psych: Anxious.    04-21    135  |  103  |  44<H>  ----------------------------<  94  3.9   |  17<L>  |  1.11    Ca    9.1      21 Apr 2019 16:10    TPro  7.8  /  Alb  3.0<L>  /  TBili  0.3  /  DBili  x   /  AST  25  /  ALT  16  /  AlkPhos  108  04-21      PT/INR - ( 21 Apr 2019 21:29 )   PT: 13.4 sec;   INR: 1.17 ratio         PTT - ( 21 Apr 2019 21:29 )  PTT:26.2 sec                                        11.8   13.2  )-----------( 405      ( 21 Apr 2019 16:10 )             36.2     CAPILLARY BLOOD GLUCOSE    < from: Transthoracic Echocardiogram (05.16.18 @ 07:30) >  ------------------------------------------------------------------------  PROCEDURE: Transthoracic echocardiogram with 2-D, M-Mode  and complete spectral and color flow Doppler.  INDICATION: Palpitations (R00.2)  ------------------------------------------------------------------------  DIMENSIONS:  Dimensions:     Normal Values:  LA:     3.8 cm    2.0 - 4.0 cm  Ao:     2.4 cm    2.0 - 3.8 cm  SEPTUM: 0.6 cm    0.6 - 1.2 cm  PWT:    0.7 cm    0.6 - 1.1 cm  LVIDd:  4.7 cm    3.0 - 5.6 cm  LVIDs:  3.0 cm    1.8 - 4.0 cm  Derived Variables:  LVMI: 64 g/m2  RWT: 0.29  Fractional short: 36 %  Ejection Fraction (Teicholtz): 66 %  ------------------------------------------------------------------------  OBSERVATIONS:  Mitral Valve: Mitral annular calcification, otherwise  normal mitral valve. Mild mitral regurgitation.  Aortic Root: Normal aortic root.  Aortic Valve: Aortic valve leaflet morphology not well  visualized.  The valve is calcified. Peak transaortic valve  gradient equals 26 mm Hg, mean transaortic valve gradient  equals 12 mm Hg, consistent with probable mild to moderate  aortic stenosis. Mild aortic regurgitation.  Left Atrium: Mildly dilated left atrium. LA volume index =  41 cc/m2.  Left Ventricle: Normal left ventricular systolic function.  No segmental wall motion abnormalities. Normal left  ventricular internal dimensions and wall thicknesses.  Right Heart: Normal right atrium. Normal right ventricular  size and function. Normal tricuspid valve. Mild tricuspid  regurgitation. Normal pulmonic valve. Minimal pulmonic  regurgitation.  Pericardium/PleuraNormal pericardium with no pericardial  effusion.  Hemodynamic: Estimated right ventricular systolic pressure  equals 43 mm Hg, assuming right atrial pressure equals 10  mm Hg, consistent with mild pulmonary hypertension.  ------------------------------------------------------------------------  CONCLUSIONS:  1. Mitral annular calcification, otherwise normal mitral  valve. Mild mitral regurgitation.  2. Aortic valve leaflet morphology not well visualized.  The valve is calcified. Peak transaortic valve gradient  equals 26 mm Hg, mean transaortic valve gradient equals 12  mm Hg, consistentwith probable mild to moderate aortic  stenosis. Mild aortic regurgitation.  3. Mildly dilated left atrium.  LA volume index = 41 cc/m2.  4. Normal left ventricular internal dimensions and wall  thicknesses.  5. Normal left ventricular systolic function. No segmental  wall motion abnormalities.  6. Normal right ventricular size and function.  7. Estimated right ventricular systolic pressure equals 43  mm Hg, assuming right atrial pressure equals 10 mm Hg,  consistent with mild pulmonary hypertension.  ------------------------------------------------------------------------  Confirmed on  5/16/2018 - 10:40:17 by Prashant Hussein M.D.    < end of copied text >    EKG reviewed:  NSR, no arrythmia, notching of p-wave in lead II. HCP: Brittni Castro [phone]    OUTPATIENT PROVIDERS  Dr. Navdeep Villafuerte (PMD)  Dr. Delong (Cards)      HPI:  73F PMH seizure disorder (1 event in remote past, not on meds), EtOH abuse, CAD w/ stent, R THR (2/2 to right hip fracture in 2015 with revision to THR in 2016) course c/b periprostetic fracture in July 2018 with ORIF on 7/19/18, subsequent MRSA infection of R thigh on 8/2018 with I&D placed on long term antibiotics (hip not removed at this time 2/2 to fracture having not healed) now presenting with R hip pain and difficulty ambulating.  Pt was admitted in Dec 2018 for hip pain, at which time pt was discharged with 1 month of abx which finished mid-Jan.  Pt last seen by Dr. Be on 2/19 in the office for worsening pain at which time doxycycline 100 mg BID was continued for one more month.  Pt states she finished this script mid-March and since then has been off antibiotics.  Pt presents to ED today for worsening hip pain, fluctuance in right hip today.  She was seen by orthopedic surgery in ED with plans for girdle procedure.  Medicine called today for pre-op clearance.    At time of exam, pt endorses fevers and chills, denies nausea.  States she was having diarrhea a few days ago and started lomotil given to her by her primary care physician.  Last BM was 3 days ago.  Endorsed hip pain that feels like aching on right hip with inability to walk.  Pt denies taking any narcotics for this pain recently.    Regarding cardiac history, pt states she had 3 stents to her health 10 years ago.  States she is on metoprolol, however this is not found in her medications prescribed.  Pt sleeps with 5-6 pillows at night due to troubles breathing for the last year, cannot walk multiple blocks 2/2 to pain.  Pt walks with a cane.    REVIEW OF SYSTEMS:  General:  endorses weight loss  EYES/ENT: No visual changes;  No vertigo or throat pain   NECK: No pain or stiffness  RESPIRATORY: Endorses cough and rhinorrhea.  CARDIOVASCULAR: No chest pain or palpitations  GASTROINTESTINAL: No abdominal or epigastric pain. No nausea, vomiting, or hematemesis; No diarrhea or constipation. No melena or hematochezia.  GENITOURINARY: No dysuria, frequency or hematuria  NEUROLOGICAL: No numbness or weakness  SKIN: See HPI  MSK:  See HPI        Home Medications:  ACETAMINOPHEN-COD #4 TABLET:  (21 Apr 2019 23:15)  amLODIPine 10 mg oral tablet: 1 tab(s) orally once a day (21 Apr 2019 23:15)  aspirin 81 mg oral delayed release tablet: 1 tab(s) orally once a day (21 Apr 2019 23:15)  cloNIDine 0.1 mg oral tablet: 1 tab(s) orally once a day (21 Apr 2019 23:15)  DIPHENOXYLATE-ATROP 2.5-0.025:  (21 Apr 2019 23:15)  lisinopril 10 mg oral tablet: 1 tab(s) orally once a day (21 Apr 2019 23:15)  metoprolol?    PAST MEDICAL & SURGICAL HISTORY:  Pain of right hip joint  Migraine  Alcohol abuse  Seizure  Stented coronary artery  Coronary artery disease  Anxiety  HTN (hypertension)  Emphysema, unspecified  Anxiety  Migraine  CAD (coronary artery disease)  Hyperlipidemia  Hypertension  Status post total hip replacement, right: 5/21/18  S/P bladder repair    Social History:  Pt lives at home with  who has had a stroke, adult son and his gf and 5 yo grandchild.    Allergies    No Known Allergies    Intolerances        Vital Signs Last 24 Hrs  T(C): 36.9 (21 Apr 2019 19:22), Max: 37.1 (21 Apr 2019 16:30)  T(F): 98.5 (21 Apr 2019 19:22), Max: 98.8 (21 Apr 2019 18:31)  HR: 95 (21 Apr 2019 19:22) (60 - 95)  BP: 178/70 (21 Apr 2019 19:22) (121/68 - 183/82)  BP(mean): 94 (21 Apr 2019 18:31) (94 - 94)  RR: 16 (21 Apr 2019 19:22) (16 - 18)  SpO2: 95% (21 Apr 2019 19:22) (95% - 99%)    Appearance: Laying in bed, falling asleep.  HEENT:   Dry oral mucosa, PEERL, EOMI  Lymphatic: No LAD noted in cervical nodes  Cardiovascular: RRR, No murmurs, gallops or rubs appreciated  Respiratory: Lungs with expiratory wheezes noted, distant breath sounds  Gastrointestinal:  Soft, nondistended, nontender, +BS	  Skin: right hip with well healed surgical scar, erythema and warmth on palpation with fluctuance 3cm area noted at hip  Neurologic: AOx3, CNII-XII grossly intact  Extremities: Moving all extremities equally; moving lower extremites less 2/2 to pain  Vascular: palpable dp, pt and radial pulses 2+ bilaterally  2+ edema noted.  Psych: Anxious.    04-21    135  |  103  |  44<H>  ----------------------------<  94  3.9   |  17<L>  |  1.11    Ca    9.1      21 Apr 2019 16:10    TPro  7.8  /  Alb  3.0<L>  /  TBili  0.3  /  DBili  x   /  AST  25  /  ALT  16  /  AlkPhos  108  04-21      PT/INR - ( 21 Apr 2019 21:29 )   PT: 13.4 sec;   INR: 1.17 ratio         PTT - ( 21 Apr 2019 21:29 )  PTT:26.2 sec                                        11.8   13.2  )-----------( 405      ( 21 Apr 2019 16:10 )             36.2     CAPILLARY BLOOD GLUCOSE    < from: Transthoracic Echocardiogram (05.16.18 @ 07:30) >  ------------------------------------------------------------------------  PROCEDURE: Transthoracic echocardiogram with 2-D, M-Mode  and complete spectral and color flow Doppler.  INDICATION: Palpitations (R00.2)  ------------------------------------------------------------------------  DIMENSIONS:  Dimensions:     Normal Values:  LA:     3.8 cm    2.0 - 4.0 cm  Ao:     2.4 cm    2.0 - 3.8 cm  SEPTUM: 0.6 cm    0.6 - 1.2 cm  PWT:    0.7 cm    0.6 - 1.1 cm  LVIDd:  4.7 cm    3.0 - 5.6 cm  LVIDs:  3.0 cm    1.8 - 4.0 cm  Derived Variables:  LVMI: 64 g/m2  RWT: 0.29  Fractional short: 36 %  Ejection Fraction (Teicholtz): 66 %  ------------------------------------------------------------------------  OBSERVATIONS:  Mitral Valve: Mitral annular calcification, otherwise  normal mitral valve. Mild mitral regurgitation.  Aortic Root: Normal aortic root.  Aortic Valve: Aortic valve leaflet morphology not well  visualized.  The valve is calcified. Peak transaortic valve  gradient equals 26 mm Hg, mean transaortic valve gradient  equals 12 mm Hg, consistent with probable mild to moderate  aortic stenosis. Mild aortic regurgitation.  Left Atrium: Mildly dilated left atrium. LA volume index =  41 cc/m2.  Left Ventricle: Normal left ventricular systolic function.  No segmental wall motion abnormalities. Normal left  ventricular internal dimensions and wall thicknesses.  Right Heart: Normal right atrium. Normal right ventricular  size and function. Normal tricuspid valve. Mild tricuspid  regurgitation. Normal pulmonic valve. Minimal pulmonic  regurgitation.  Pericardium/PleuraNormal pericardium with no pericardial  effusion.  Hemodynamic: Estimated right ventricular systolic pressure  equals 43 mm Hg, assuming right atrial pressure equals 10  mm Hg, consistent with mild pulmonary hypertension.  ------------------------------------------------------------------------  CONCLUSIONS:  1. Mitral annular calcification, otherwise normal mitral  valve. Mild mitral regurgitation.  2. Aortic valve leaflet morphology not well visualized.  The valve is calcified. Peak transaortic valve gradient  equals 26 mm Hg, mean transaortic valve gradient equals 12  mm Hg, consistentwith probable mild to moderate aortic  stenosis. Mild aortic regurgitation.  3. Mildly dilated left atrium.  LA volume index = 41 cc/m2.  4. Normal left ventricular internal dimensions and wall  thicknesses.  5. Normal left ventricular systolic function. No segmental  wall motion abnormalities.  6. Normal right ventricular size and function.  7. Estimated right ventricular systolic pressure equals 43  mm Hg, assuming right atrial pressure equals 10 mm Hg,  consistent with mild pulmonary hypertension.  ------------------------------------------------------------------------  Confirmed on  5/16/2018 - 10:40:17 by Prashant Hussein M.D.    < end of copied text >    EKG reviewed:  NSR, no arrythmia, notching of p-wave in lead II.    CXR with mild blunting at costaphrenic angles HCP: Brittni Castro [phone]    Chief Compliant: right hip pain and shortness of breath     HPI:  73F PMH seizure disorder (1 event in remote past, not on meds presumably from benzo withdrawal per chart review), EtOH abuse (last drink several years ago), daily smoker, CAD w/ stent, R THR (2/2 to right hip fracture in 2015 with revision to THR in 2016) course c/b periprostetic fracture in July 2018 with ORIF on 7/19/18, subsequent MRSA infection of R thigh on 8/2018 with I&D placed on long term antibiotics (hip not removed at this time 2/2 to fracture having not healed) now presenting with R hip pain and difficulty ambulating.  Pt was admitted in Dec 2018 for hip pain, at which time pt was discharged with 1 month of abx which finished mid-Jan.  Pt last seen by Dr. Be on 2/19 in the office for worsening pain at which time doxycycline 100 mg BID was continued for one more month.  Pt states she finished this script mid-March and since then has been off antibiotics.  Pt presents to ED today for worsening hip pain, fluctuance in right hip today which she reports started on Friday.  She was seen by orthopedic surgery in ED with plans for girdle procedure.  Medicine called today for pre-op clearance.    At time of exam, pt endorses fevers and chills, denies nausea.  States she was having diarrhea a few days ago and started lomotil given to her by her primary care physician.  Last BM was 3 days ago.  Endorsed hip pain that feels like aching on right hip with inability to walk.  Pt denies taking any narcotics for this pain recently.     Regarding cardiac history, pt states she had 3 stents over 10 years ago.  States she is on metoprolol, however this is not found in her medications prescribed.  Pt sleeps with 5-6 pillows at night due to troubles breathing for the last year, cannot walk multiple blocks 2/2 to pain.  Pt walks with a cane. She has had worsening leg edema. She stopped taking her diuretics. She does endorse dyspnea with exertion and some at rest. She denies chest pain, jaw pain, tearing back pain.     REVIEW OF SYSTEMS:    CONSTITUTIONAL: +Fevers and chills. +weakness.   ENMT:  No ear pain, No vertigo or throat pain  EYES: No visual changes  or photophobia  NECK: No pain or stiffness  RESPIRATORY: No wheezing, hemoptysis; +shortness of breath ; +chronic cough.   CARDIOVASCULAR: No chest pain or palpitations  GASTROINTESTINAL: No abdominal or epigastric pain. No nausea, vomiting, or hematemesis; No diarrhea or constipation. No melena or hematochezia.  MUSCULOSKELETAL: right hip pain and fluctuance   GENITOURINARY: No dysuria, frequency or hematuria  NEUROLOGICAL: No numbness. No falls.   PSYCHIATRIC: No depression or anhedonia.  ENDOCRINE: No sx hypoglycemic episodes.  SKIN: +right thigh skin rash           Home Medications:  ACETAMINOPHEN-COD #4 TABLET:  (21 Apr 2019 23:15)  amLODIPine 10 mg oral tablet: 1 tab(s) orally once a day (21 Apr 2019 23:15)  aspirin 81 mg oral delayed release tablet: 1 tab(s) orally once a day (21 Apr 2019 23:15)  cloNIDine 0.1 mg oral tablet: 1 tab(s) orally once a day (21 Apr 2019 23:15)  DIPHENOXYLATE-ATROP 2.5-0.025:  (21 Apr 2019 23:15)  lisinopril 10 mg oral tablet: 1 tab(s) orally once a day (21 Apr 2019 23:15)  metoprolol?    PAST MEDICAL & SURGICAL HISTORY:  Pain of right hip joint  Migraine  Alcohol abuse  Seizure  Stented coronary artery  Coronary artery disease  Anxiety  HTN (hypertension)  Emphysema, unspecified  Anxiety  Migraine  CAD (coronary artery disease)  Hyperlipidemia  Hypertension  Status post total hip replacement, right: 5/21/18  S/P bladder repair    Social History:  Pt lives at home with  who has had a stroke, adult son and his gf and 7 yo grandchild. No ETOH in several years. Daily smoker.     Allergies    No Known Allergies    Physical Exam:    Vital Signs Last 24 Hrs  T(C): 36.9 (21 Apr 2019 19:22), Max: 37.1 (21 Apr 2019 16:30)  T(F): 98.5 (21 Apr 2019 19:22), Max: 98.8 (21 Apr 2019 18:31)  HR: 95 (21 Apr 2019 19:22) (60 - 95)  BP: 178/70 (21 Apr 2019 19:22) (121/68 - 183/82)  BP(mean): 94 (21 Apr 2019 18:31) (94 - 94)  RR: 16 (21 Apr 2019 19:22) (16 - 18)  SpO2: 95% (21 Apr 2019 19:22) (95% - 99%)    Appearance: Laying in bed, intermittently falls asleep during conversation.   HEENT:   Dry oral mucosa, PEERL, EOMI  Lymphatic: No LAD noted in cervical nodes  Cardiovascular: S1, S2, 3/6 systolic ejection murmur best heard at right 2nd ICS. +2 pitting edema bilaterally. No appreciable JVD.   Respiratory: Lungs with expiratory wheezes noted which clear with coughs, distant breath sounds, does not pull good TV to assess for crackles   Gastrointestinal:  Soft, nondistended, nontender, +BS	  Skin: right hip with well healed surgical scar, erythema and warmth on palpation with fluctuance 3cm area noted at hip, not warm but +tender to palpate.   Neurologic: Moves all extremities but lower right leg is limited on flexion & extension 2/2 to pain. Unable to get accurate motor and sensory exam given she is lethargic and not fully complying. Her CN II-XII appear grossly intact bilaterally.   Extremities: Right hip protrusion noted.    Vascular: palpable dp, pt and radial pulses 2+ bilaterally  Psych: Anxious appearing. No depression    Labs  04-21    135  |  103  |  44<H>  ----------------------------<  94  3.9   |  17<L>  |  1.11    Ca    9.1      21 Apr 2019 16:10    TPro  7.8  /  Alb  3.0<L>  /  TBili  0.3  /  DBili  x   /  AST  25  /  ALT  16  /  AlkPhos  108  04-21      PT/INR - ( 21 Apr 2019 21:29 )   PT: 13.4 sec;   INR: 1.17 ratio         PTT - ( 21 Apr 2019 21:29 )  PTT:26.2 sec                                        11.8   13.2  )-----------( 405      ( 21 Apr 2019 16:10 )             36.2     CAPILLARY BLOOD GLUCOSE    < from: Transthoracic Echocardiogram (05.16.18 @ 07:30) >  ------------------------------------------------------------------------  PROCEDURE: Transthoracic echocardiogram with 2-D, M-Mode  and complete spectral and color flow Doppler.  INDICATION: Palpitations (R00.2)  ------------------------------------------------------------------------  DIMENSIONS:  Dimensions:     Normal Values:  LA:     3.8 cm    2.0 - 4.0 cm  Ao:     2.4 cm    2.0 - 3.8 cm  SEPTUM: 0.6 cm    0.6 - 1.2 cm  PWT:    0.7 cm    0.6 - 1.1 cm  LVIDd:  4.7 cm    3.0 - 5.6 cm  LVIDs:  3.0 cm    1.8 - 4.0 cm  Derived Variables:  LVMI: 64 g/m2  RWT: 0.29  Fractional short: 36 %  Ejection Fraction (Teicholtz): 66 %  ------------------------------------------------------------------------  OBSERVATIONS:  Mitral Valve: Mitral annular calcification, otherwise  normal mitral valve. Mild mitral regurgitation.  Aortic Root: Normal aortic root.  Aortic Valve: Aortic valve leaflet morphology not well  visualized.  The valve is calcified. Peak transaortic valve  gradient equals 26 mm Hg, mean transaortic valve gradient  equals 12 mm Hg, consistent with probable mild to moderate  aortic stenosis. Mild aortic regurgitation.  Left Atrium: Mildly dilated left atrium. LA volume index =  41 cc/m2.  Left Ventricle: Normal left ventricular systolic function.  No segmental wall motion abnormalities. Normal left  ventricular internal dimensions and wall thicknesses.  Right Heart: Normal right atrium. Normal right ventricular  size and function. Normal tricuspid valve. Mild tricuspid  regurgitation. Normal pulmonic valve. Minimal pulmonic  regurgitation.  Pericardium/PleuraNormal pericardium with no pericardial  effusion.  Hemodynamic: Estimated right ventricular systolic pressure  equals 43 mm Hg, assuming right atrial pressure equals 10  mm Hg, consistent with mild pulmonary hypertension.  ------------------------------------------------------------------------  CONCLUSIONS:  1. Mitral annular calcification, otherwise normal mitral  valve. Mild mitral regurgitation.  2. Aortic valve leaflet morphology not well visualized.  The valve is calcified. Peak transaortic valve gradient  equals 26 mm Hg, mean transaortic valve gradient equals 12  mm Hg, consistentwith probable mild to moderate aortic  stenosis. Mild aortic regurgitation.  3. Mildly dilated left atrium.  LA volume index = 41 cc/m2.  4. Normal left ventricular internal dimensions and wall  thicknesses.  5. Normal left ventricular systolic function. No segmental  wall motion abnormalities.  6. Normal right ventricular size and function.  7. Estimated right ventricular systolic pressure equals 43  mm Hg, assuming right atrial pressure equals 10 mm Hg,  consistent with mild pulmonary hypertension.  ------------------------------------------------------------------------  Confirmed on  5/16/2018 - 10:40:17 by Prashant Hussein M.D.    < end of copied text >         I personally reviewed & interpreted the lab findings above;  I personally reviewed & interpreted the radiographic findings;  I personally reviewed & interpreted the EKG findings; HCP: Brittni Castro [phone]    Chief Compliant: right hip pain and shortness of breath     HPI:  73F PMH seizure disorder (1 event in remote past, not on meds presumably from benzo withdrawal per chart review), EtOH abuse (last drink several years ago), daily smoker, CAD w/ stent, R THR (2/2 to right hip fracture in 2015 with revision to THR in 2016) course c/b periprostetic fracture in July 2018 with ORIF on 7/19/18, subsequent MRSA infection of R thigh on 8/2018 with I&D placed on long term antibiotics (hip not removed at this time 2/2 to fracture having not healed) now presenting with R hip pain and difficulty ambulating.  Pt was admitted in Dec 2018 for hip pain, at which time pt was discharged with 1 month of abx which finished mid-Jan.  Pt last seen by Dr. Be on 2/19 in the office for worsening pain at which time doxycycline 100 mg BID was continued for one more month.  Pt states she finished this script mid-March and since then has been off antibiotics.  Pt presents to ED today for worsening hip pain, fluctuance in right hip today which she reports started on Friday.  She was seen by orthopedic surgery in ED with plans for girdle procedure.  Medicine called today for pre-op clearance.    At time of exam, pt endorses fevers and chills, denies nausea.  States she was having diarrhea a few days ago and started lomotil given to her by her primary care physician.  Last BM was 3 days ago.  Endorsed hip pain that feels like aching on right hip with inability to walk.  Pt denies taking any narcotics for this pain recently.     Regarding cardiac history, pt states she had 3 stents over 10 years ago.  States she is on metoprolol, however this is not found in her medications prescribed.  Pt sleeps with 5-6 pillows at night due to troubles breathing for the last year, cannot walk multiple blocks 2/2 to pain.  Pt walks with a cane. She has had worsening leg edema. She stopped taking her diuretics. She does endorse dyspnea with exertion and some at rest. She denies chest pain, jaw pain, tearing back pain.     REVIEW OF SYSTEMS:    CONSTITUTIONAL: +Fevers and chills. +weakness.   ENMT:  No ear pain, No vertigo or throat pain  EYES: No visual changes  or photophobia  NECK: No pain or stiffness  RESPIRATORY: No wheezing, hemoptysis; +shortness of breath ; +chronic cough.   CARDIOVASCULAR: No chest pain or palpitations  GASTROINTESTINAL: No abdominal or epigastric pain. No nausea, vomiting, or hematemesis; No diarrhea or constipation. No melena or hematochezia.  MUSCULOSKELETAL: right hip pain and fluctuance   GENITOURINARY: No dysuria, frequency or hematuria  NEUROLOGICAL: No numbness. No falls.   PSYCHIATRIC: No depression or anhedonia.  ENDOCRINE: No sx hypoglycemic episodes.  SKIN: +right thigh skin rash           Home Medications:  ACETAMINOPHEN-COD #4 TABLET:  (21 Apr 2019 23:15)  amLODIPine 10 mg oral tablet: 1 tab(s) orally once a day (21 Apr 2019 23:15)  aspirin 81 mg oral delayed release tablet: 1 tab(s) orally once a day (21 Apr 2019 23:15)  cloNIDine 0.1 mg oral tablet: 1 tab(s) orally once a day (21 Apr 2019 23:15)  DIPHENOXYLATE-ATROP 2.5-0.025:  (21 Apr 2019 23:15)  lisinopril 10 mg oral tablet: 1 tab(s) orally once a day (21 Apr 2019 23:15)  metoprolol?    PAST MEDICAL & SURGICAL HISTORY:  Pain of right hip joint  Migraine  Alcohol abuse  Seizure  Stented coronary artery  Coronary artery disease  Anxiety  HTN (hypertension)  Emphysema, unspecified  Anxiety  Migraine  CAD (coronary artery disease)  Hyperlipidemia  Hypertension  Status post total hip replacement, right: 5/21/18  S/P bladder repair    Social History:  Pt lives at home with  who has had a stroke, adult son and his gf and 7 yo grandchild. No ETOH in several years. Daily smoker.     Allergies    No Known Allergies    Physical Exam:    Vital Signs Last 24 Hrs  T(C): 36.9 (21 Apr 2019 19:22), Max: 37.1 (21 Apr 2019 16:30)  T(F): 98.5 (21 Apr 2019 19:22), Max: 98.8 (21 Apr 2019 18:31)  HR: 95 (21 Apr 2019 19:22) (60 - 95)  BP: 178/70 (21 Apr 2019 19:22) (121/68 - 183/82)  BP(mean): 94 (21 Apr 2019 18:31) (94 - 94)  RR: 16 (21 Apr 2019 19:22) (16 - 18)  SpO2: 95% (21 Apr 2019 19:22) (95% - 99%)    Appearance: Laying in bed, intermittently falls asleep during conversation. Chronically ill-appearing.   HEENT:   Dry oral mucosa, PEERL, EOMI  Lymphatic: No LAD noted in cervical nodes  Cardiovascular: S1, S2, 3/6 systolic ejection murmur best heard at right 2nd ICS. +2 pitting edema bilaterally. No appreciable JVD.   Respiratory: Lungs with expiratory wheezes noted which clear with coughs, distant breath sounds, does not pull good TV to assess for crackles   Gastrointestinal:  Soft, nondistended, nontender, +BS	  Skin: right hip with well healed surgical scar, erythema and warmth on palpation with fluctuance 3cm area noted at hip, not warm but +tender to palpate.   Neurologic: Moves all extremities but lower right leg is limited on flexion & extension 2/2 to pain. Unable to get accurate motor and sensory exam given she is lethargic and not fully complying. Her CN II-XII appear grossly intact bilaterally.   Extremities: Right hip protrusion noted.    Vascular: palpable dp, pt and radial pulses 2+ bilaterally  Psych: Anxious appearing. No depression    Labs  04-21    135  |  103  |  44<H>  ----------------------------<  94  3.9   |  17<L>  |  1.11    Ca    9.1      21 Apr 2019 16:10    TPro  7.8  /  Alb  3.0<L>  /  TBili  0.3  /  DBili  x   /  AST  25  /  ALT  16  /  AlkPhos  108  04-21      PT/INR - ( 21 Apr 2019 21:29 )   PT: 13.4 sec;   INR: 1.17 ratio         PTT - ( 21 Apr 2019 21:29 )  PTT:26.2 sec                                        11.8   13.2  )-----------( 405      ( 21 Apr 2019 16:10 )             36.2     CAPILLARY BLOOD GLUCOSE    < from: Transthoracic Echocardiogram (05.16.18 @ 07:30) >  ------------------------------------------------------------------------  PROCEDURE: Transthoracic echocardiogram with 2-D, M-Mode  and complete spectral and color flow Doppler.  INDICATION: Palpitations (R00.2)  ------------------------------------------------------------------------  DIMENSIONS:  Dimensions:     Normal Values:  LA:     3.8 cm    2.0 - 4.0 cm  Ao:     2.4 cm    2.0 - 3.8 cm  SEPTUM: 0.6 cm    0.6 - 1.2 cm  PWT:    0.7 cm    0.6 - 1.1 cm  LVIDd:  4.7 cm    3.0 - 5.6 cm  LVIDs:  3.0 cm    1.8 - 4.0 cm  Derived Variables:  LVMI: 64 g/m2  RWT: 0.29  Fractional short: 36 %  Ejection Fraction (Teicholtz): 66 %  ------------------------------------------------------------------------  OBSERVATIONS:  Mitral Valve: Mitral annular calcification, otherwise  normal mitral valve. Mild mitral regurgitation.  Aortic Root: Normal aortic root.  Aortic Valve: Aortic valve leaflet morphology not well  visualized.  The valve is calcified. Peak transaortic valve  gradient equals 26 mm Hg, mean transaortic valve gradient  equals 12 mm Hg, consistent with probable mild to moderate  aortic stenosis. Mild aortic regurgitation.  Left Atrium: Mildly dilated left atrium. LA volume index =  41 cc/m2.  Left Ventricle: Normal left ventricular systolic function.  No segmental wall motion abnormalities. Normal left  ventricular internal dimensions and wall thicknesses.  Right Heart: Normal right atrium. Normal right ventricular  size and function. Normal tricuspid valve. Mild tricuspid  regurgitation. Normal pulmonic valve. Minimal pulmonic  regurgitation.  Pericardium/PleuraNormal pericardium with no pericardial  effusion.  Hemodynamic: Estimated right ventricular systolic pressure  equals 43 mm Hg, assuming right atrial pressure equals 10  mm Hg, consistent with mild pulmonary hypertension.  ------------------------------------------------------------------------  CONCLUSIONS:  1. Mitral annular calcification, otherwise normal mitral  valve. Mild mitral regurgitation.  2. Aortic valve leaflet morphology not well visualized.  The valve is calcified. Peak transaortic valve gradient  equals 26 mm Hg, mean transaortic valve gradient equals 12  mm Hg, consistentwith probable mild to moderate aortic  stenosis. Mild aortic regurgitation.  3. Mildly dilated left atrium.  LA volume index = 41 cc/m2.  4. Normal left ventricular internal dimensions and wall  thicknesses.  5. Normal left ventricular systolic function. No segmental  wall motion abnormalities.  6. Normal right ventricular size and function.  7. Estimated right ventricular systolic pressure equals 43  mm Hg, assuming right atrial pressure equals 10 mm Hg,  consistent with mild pulmonary hypertension.  ------------------------------------------------------------------------  Confirmed on  5/16/2018 - 10:40:17 by Prashant Hussein M.D.    < end of copied text >         I personally reviewed & interpreted the lab findings above;  I personally reviewed & interpreted the radiographic findings;  I personally reviewed & interpreted the EKG findings;

## 2019-05-16 ENCOUNTER — APPOINTMENT (OUTPATIENT)
Dept: ORTHOPEDIC SURGERY | Facility: CLINIC | Age: 74
End: 2019-05-16
Payer: MEDICARE

## 2019-05-16 PROCEDURE — 99024 POSTOP FOLLOW-UP VISIT: CPT

## 2019-05-16 PROCEDURE — 73502 X-RAY EXAM HIP UNI 2-3 VIEWS: CPT | Mod: RT

## 2019-06-05 NOTE — PHYSICAL THERAPY INITIAL EVALUATION ADULT - PHYSICAL ASSIST/NONPHYSICAL ASSIST, REHAB EVAL
nonverbal cues (demo/gestures)/supervision/verbal cues/1 person + 1 person to manage equipment
supervision
1 person assist/verbal cues

## 2019-06-24 ENCOUNTER — EMERGENCY (EMERGENCY)
Facility: HOSPITAL | Age: 74
LOS: 1 days | Discharge: HOME | End: 2019-06-24
Attending: EMERGENCY MEDICINE
Payer: MEDICARE

## 2019-06-24 VITALS
HEIGHT: 62.5 IN | WEIGHT: 130.07 LBS | HEART RATE: 57 BPM | OXYGEN SATURATION: 97 % | DIASTOLIC BLOOD PRESSURE: 65 MMHG | SYSTOLIC BLOOD PRESSURE: 166 MMHG | TEMPERATURE: 98 F

## 2019-06-24 VITALS
SYSTOLIC BLOOD PRESSURE: 174 MMHG | RESPIRATION RATE: 19 BRPM | HEART RATE: 56 BPM | OXYGEN SATURATION: 99 % | DIASTOLIC BLOOD PRESSURE: 56 MMHG | TEMPERATURE: 98 F

## 2019-06-24 DIAGNOSIS — Z98.89 OTHER SPECIFIED POSTPROCEDURAL STATES: Chronic | ICD-10-CM

## 2019-06-24 DIAGNOSIS — Z96.641 PRESENCE OF RIGHT ARTIFICIAL HIP JOINT: Chronic | ICD-10-CM

## 2019-06-24 LAB
ALBUMIN SERPL ELPH-MCNC: 4.1 G/DL — SIGNIFICANT CHANGE UP (ref 3.3–5)
ALP SERPL-CCNC: 141 U/L — HIGH (ref 40–120)
ALT FLD-CCNC: 10 U/L — SIGNIFICANT CHANGE UP (ref 10–45)
ANION GAP SERPL CALC-SCNC: 13 MMOL/L — SIGNIFICANT CHANGE UP (ref 5–17)
AST SERPL-CCNC: 30 U/L — SIGNIFICANT CHANGE UP (ref 10–40)
BASOPHILS # BLD AUTO: 0 K/UL — SIGNIFICANT CHANGE UP (ref 0–0.2)
BASOPHILS NFR BLD AUTO: 0.7 % — SIGNIFICANT CHANGE UP (ref 0–2)
BILIRUB SERPL-MCNC: 0.2 MG/DL — SIGNIFICANT CHANGE UP (ref 0.2–1.2)
BUN SERPL-MCNC: 11 MG/DL — SIGNIFICANT CHANGE UP (ref 7–23)
CALCIUM SERPL-MCNC: 8.9 MG/DL — SIGNIFICANT CHANGE UP (ref 8.4–10.5)
CHLORIDE SERPL-SCNC: 107 MMOL/L — SIGNIFICANT CHANGE UP (ref 96–108)
CO2 SERPL-SCNC: 21 MMOL/L — LOW (ref 22–31)
CREAT SERPL-MCNC: 0.72 MG/DL — SIGNIFICANT CHANGE UP (ref 0.5–1.3)
EOSINOPHIL # BLD AUTO: 0.2 K/UL — SIGNIFICANT CHANGE UP (ref 0–0.5)
EOSINOPHIL NFR BLD AUTO: 4.4 % — SIGNIFICANT CHANGE UP (ref 0–6)
GAS PNL BLDV: SIGNIFICANT CHANGE UP
GLUCOSE SERPL-MCNC: 116 MG/DL — HIGH (ref 70–99)
HCT VFR BLD CALC: 40.2 % — SIGNIFICANT CHANGE UP (ref 34.5–45)
HGB BLD-MCNC: 12.5 G/DL — SIGNIFICANT CHANGE UP (ref 11.5–15.5)
LIDOCAIN IGE QN: 4 U/L — LOW (ref 7–60)
LYMPHOCYTES # BLD AUTO: 1 K/UL — SIGNIFICANT CHANGE UP (ref 1–3.3)
LYMPHOCYTES # BLD AUTO: 19.1 % — SIGNIFICANT CHANGE UP (ref 13–44)
MCHC RBC-ENTMCNC: 28.6 PG — SIGNIFICANT CHANGE UP (ref 27–34)
MCHC RBC-ENTMCNC: 31.1 GM/DL — LOW (ref 32–36)
MCV RBC AUTO: 91.8 FL — SIGNIFICANT CHANGE UP (ref 80–100)
MONOCYTES # BLD AUTO: 0.3 K/UL — SIGNIFICANT CHANGE UP (ref 0–0.9)
MONOCYTES NFR BLD AUTO: 6.6 % — SIGNIFICANT CHANGE UP (ref 2–14)
NEUTROPHILS # BLD AUTO: 3.4 K/UL — SIGNIFICANT CHANGE UP (ref 1.8–7.4)
NEUTROPHILS NFR BLD AUTO: 69.1 % — SIGNIFICANT CHANGE UP (ref 43–77)
OB PNL STL: NEGATIVE — SIGNIFICANT CHANGE UP
PLATELET # BLD AUTO: 241 K/UL — SIGNIFICANT CHANGE UP (ref 150–400)
POTASSIUM SERPL-MCNC: 4.7 MMOL/L — SIGNIFICANT CHANGE UP (ref 3.5–5.3)
POTASSIUM SERPL-SCNC: 4.7 MMOL/L — SIGNIFICANT CHANGE UP (ref 3.5–5.3)
PROT SERPL-MCNC: 8.5 G/DL — HIGH (ref 6–8.3)
RBC # BLD: 4.38 M/UL — SIGNIFICANT CHANGE UP (ref 3.8–5.2)
RBC # FLD: 14.4 % — SIGNIFICANT CHANGE UP (ref 10.3–14.5)
SODIUM SERPL-SCNC: 141 MMOL/L — SIGNIFICANT CHANGE UP (ref 135–145)
WBC # BLD: 5 K/UL — SIGNIFICANT CHANGE UP (ref 3.8–10.5)
WBC # FLD AUTO: 5 K/UL — SIGNIFICANT CHANGE UP (ref 3.8–10.5)

## 2019-06-24 PROCEDURE — 74177 CT ABD & PELVIS W/CONTRAST: CPT

## 2019-06-24 PROCEDURE — 99284 EMERGENCY DEPT VISIT MOD MDM: CPT | Mod: GC

## 2019-06-24 PROCEDURE — 96374 THER/PROPH/DIAG INJ IV PUSH: CPT | Mod: XU

## 2019-06-24 PROCEDURE — 82272 OCCULT BLD FECES 1-3 TESTS: CPT

## 2019-06-24 PROCEDURE — 82947 ASSAY GLUCOSE BLOOD QUANT: CPT

## 2019-06-24 PROCEDURE — 74177 CT ABD & PELVIS W/CONTRAST: CPT | Mod: 26

## 2019-06-24 PROCEDURE — 99284 EMERGENCY DEPT VISIT MOD MDM: CPT | Mod: 25

## 2019-06-24 PROCEDURE — 85027 COMPLETE CBC AUTOMATED: CPT

## 2019-06-24 PROCEDURE — 83605 ASSAY OF LACTIC ACID: CPT

## 2019-06-24 PROCEDURE — 82803 BLOOD GASES ANY COMBINATION: CPT

## 2019-06-24 PROCEDURE — 82435 ASSAY OF BLOOD CHLORIDE: CPT

## 2019-06-24 PROCEDURE — 84132 ASSAY OF SERUM POTASSIUM: CPT

## 2019-06-24 PROCEDURE — 80053 COMPREHEN METABOLIC PANEL: CPT

## 2019-06-24 PROCEDURE — 82330 ASSAY OF CALCIUM: CPT

## 2019-06-24 PROCEDURE — 85014 HEMATOCRIT: CPT

## 2019-06-24 PROCEDURE — 84295 ASSAY OF SERUM SODIUM: CPT

## 2019-06-24 PROCEDURE — 87040 BLOOD CULTURE FOR BACTERIA: CPT

## 2019-06-24 PROCEDURE — 83690 ASSAY OF LIPASE: CPT

## 2019-06-24 RX ORDER — OXYCODONE AND ACETAMINOPHEN 5; 325 MG/1; MG/1
1 TABLET ORAL ONCE
Refills: 0 | Status: DISCONTINUED | OUTPATIENT
Start: 2019-06-24 | End: 2019-06-24

## 2019-06-24 RX ORDER — KETOROLAC TROMETHAMINE 30 MG/ML
15 SYRINGE (ML) INJECTION ONCE
Refills: 0 | Status: DISCONTINUED | OUTPATIENT
Start: 2019-06-24 | End: 2019-06-24

## 2019-06-24 RX ADMIN — Medication 15 MILLIGRAM(S): at 16:21

## 2019-06-24 RX ADMIN — OXYCODONE AND ACETAMINOPHEN 1 TABLET(S): 5; 325 TABLET ORAL at 16:45

## 2019-06-24 NOTE — ED PROVIDER NOTE - NSFOLLOWUPCLINICS_GEN_ALL_ED_FT
[facility]  Gastroenterology  [address] [place] [id number]  Phone: [phone]  Fax:   Follow Up Time:

## 2019-06-24 NOTE — ED PROVIDER NOTE - SKIN, MLM
Skin normal color for race, warm, dry and intact. No evidence of rash. Induration of skin around surgical wounds, no erythema, no fluctuance

## 2019-06-24 NOTE — ED PROVIDER NOTE - NSFOLLOWUPINSTRUCTIONS_ED_ALL_ED_FT
Please follow up with your regular doctor in the next 1-4 days.    Please follow up with gastroenterology regarding your abdominal pain and loose stools. The number has been provided above.    Return to the emergency department if you have new or worsening symptoms such as fevers, chills, black or bloody stools, inability to walk.

## 2019-06-24 NOTE — ED PROVIDER NOTE - ABDOMINAL EXAM
soft/tender... decreased rectal tone but had difficulty understanding command and complying with test/soft/tender...

## 2019-06-24 NOTE — ED PROVIDER NOTE - OBJECTIVE STATEMENT
73y female with PMH of COPD, CAD, HTN, HLD, Hx of alcohol abuse, right hip replacement w/ multiple revisions for infections, most recently treated for right hip infection 5/2019 presenting with diarrhea x2 weeks.  She states that she has had diarrhea for x2 weeks and she has not had control of her bowels over the past 3 days and she has started wearing diapers. She also has diffuse abdominal pain, no nausea or vomiting.  Also states that she has right hip pain, back pain, decreased sensation of the right foot. No fevers, chills, chest pain.

## 2019-06-24 NOTE — ED PROVIDER NOTE - CLINICAL SUMMARY MEDICAL DECISION MAKING FREE TEXT BOX
73y female with right hip pain and bowel incontinence.  Concern for recurrence of osteomyelitis, Hx of recurrent infections from R. hip replacement, recently completed 6wk course of vancomycin. Do not think c. diff, no watery diarrhea, stool not black or bloody. Will get cbc, cmp, blood cultures.  RLQ abdominal pain concerning for diverticulitis will get ct abdomen pelvis.

## 2019-06-24 NOTE — ED ADULT NURSE NOTE - CINV DISCH MEDS REVIEWED YN
escorted to waiting room via wheelchair, *refused post void bladder scan - MD aware, no c/o, extra diapers provided, *refused to sign d/c papers

## 2019-06-24 NOTE — ED ADULT NURSE REASSESSMENT NOTE - NS ED NURSE REASSESS COMMENT FT1
medicated with percocet (pt's home dose), still upset, "this is garbage dose", now sleeping in bed, CT abd pending

## 2019-06-24 NOTE — ED PROVIDER NOTE - ATTENDING CONTRIBUTION TO CARE
pt w diarrhea and prob to control defecation, no sig back pain, no numbness, no saddle parasthenia, able to walk.  sensory intact to light touch bl le. 5/5 ehl (left foot pt making intermittent efforts - when attempt made - good strength) slr neg, no midline spinal ttp. no paraspinal ttp. rectal exam by res w me bedside - tone present but perhaps diminished . abdomen benign  ctap. labs. clinically not cw cauda equina. screen w pvr just in case. alt neuro dx for bowel issue may need to be considered. await ct for now and re-consider. only abx has been on was vancomycin iv. prob not cdiff given formed stool in diaper.

## 2019-06-24 NOTE — ED PROVIDER NOTE - NEUROLOGICAL GAIT WEIGHT BEARING
able to bear weight, strength in 1st digit extension 5/5 bilaterally difficulty understanding commands for exam, had difficulty complying with test

## 2019-06-24 NOTE — ED ADULT NURSE NOTE - OBJECTIVE STATEMENT
73 yr old female from home 73 yr old female by EMS from home with c/o diarrhea x 2 wks, +has been on vanco for R hip infected hardware, +having uncontrolled scant amounts of dark stool from rectum, c/o R hip pain (treated with percocet at home), at present well appearing, R hip incision: healed and clean, ambulatory, "wt bearing as tolerated" - per pt, independent with ADLs with walker at home, lives at home with  "stroke survivor", +lorena lower leg edema

## 2019-06-24 NOTE — ED PROVIDER NOTE - PROGRESS NOTE DETAILS
Eben Armstrong, PGY-1: patient had soft stool but not watery diarrhea. Will take patient off of contact, based on stool appearance not concerned for c. diff. pt endorsed to samantha sorto Eben Armstrong, PGY-1: patient resting comfortably, not endorsing pain at this time, CT negative for intrabdominal pathology, agreeable to discharge and follow up with GI doctor.

## 2019-06-24 NOTE — ED PROVIDER NOTE - CARE PLAN
Principal Discharge DX:	Abdominal pain Principal Discharge DX:	Diarrhea, unspecified type  Goal:	resolved

## 2019-06-29 LAB
CULTURE RESULTS: SIGNIFICANT CHANGE UP
SPECIMEN SOURCE: SIGNIFICANT CHANGE UP

## 2019-07-05 ENCOUNTER — EMERGENCY (EMERGENCY)
Facility: HOSPITAL | Age: 74
LOS: 1 days | Discharge: HOME | End: 2019-07-05
Attending: EMERGENCY MEDICINE
Payer: MEDICARE

## 2019-07-05 VITALS
HEIGHT: 62 IN | TEMPERATURE: 98 F | DIASTOLIC BLOOD PRESSURE: 76 MMHG | RESPIRATION RATE: 18 BRPM | OXYGEN SATURATION: 97 % | HEART RATE: 82 BPM | SYSTOLIC BLOOD PRESSURE: 183 MMHG | WEIGHT: 134.92 LBS

## 2019-07-05 VITALS — HEART RATE: 73 BPM | DIASTOLIC BLOOD PRESSURE: 90 MMHG | SYSTOLIC BLOOD PRESSURE: 201 MMHG

## 2019-07-05 DIAGNOSIS — Z98.89 OTHER SPECIFIED POSTPROCEDURAL STATES: Chronic | ICD-10-CM

## 2019-07-05 DIAGNOSIS — Z96.641 PRESENCE OF RIGHT ARTIFICIAL HIP JOINT: Chronic | ICD-10-CM

## 2019-07-05 LAB
ALBUMIN SERPL ELPH-MCNC: 3.8 G/DL — SIGNIFICANT CHANGE UP (ref 3.3–5)
ALP SERPL-CCNC: 142 U/L — HIGH (ref 40–120)
ALT FLD-CCNC: 15 U/L — SIGNIFICANT CHANGE UP (ref 10–45)
ANION GAP SERPL CALC-SCNC: 13 MMOL/L — SIGNIFICANT CHANGE UP (ref 5–17)
APTT BLD: 19.4 SEC — LOW (ref 27.5–36.3)
AST SERPL-CCNC: 38 U/L — SIGNIFICANT CHANGE UP (ref 10–40)
BASE EXCESS BLDV CALC-SCNC: 2.8 MMOL/L — HIGH (ref -2–2)
BASOPHILS # BLD AUTO: 0 K/UL — SIGNIFICANT CHANGE UP (ref 0–0.2)
BASOPHILS NFR BLD AUTO: 0.5 % — SIGNIFICANT CHANGE UP (ref 0–2)
BILIRUB SERPL-MCNC: 0.2 MG/DL — SIGNIFICANT CHANGE UP (ref 0.2–1.2)
BUN SERPL-MCNC: 23 MG/DL — SIGNIFICANT CHANGE UP (ref 7–23)
CA-I SERPL-SCNC: 1.15 MMOL/L — SIGNIFICANT CHANGE UP (ref 1.12–1.3)
CALCIUM SERPL-MCNC: 8.4 MG/DL — SIGNIFICANT CHANGE UP (ref 8.4–10.5)
CHLORIDE BLDV-SCNC: 103 MMOL/L — SIGNIFICANT CHANGE UP (ref 96–108)
CHLORIDE SERPL-SCNC: 101 MMOL/L — SIGNIFICANT CHANGE UP (ref 96–108)
CO2 BLDV-SCNC: 30 MMOL/L — SIGNIFICANT CHANGE UP (ref 22–30)
CO2 SERPL-SCNC: 25 MMOL/L — SIGNIFICANT CHANGE UP (ref 22–31)
CREAT SERPL-MCNC: 0.61 MG/DL — SIGNIFICANT CHANGE UP (ref 0.5–1.3)
EOSINOPHIL # BLD AUTO: 0.2 K/UL — SIGNIFICANT CHANGE UP (ref 0–0.5)
EOSINOPHIL NFR BLD AUTO: 2.8 % — SIGNIFICANT CHANGE UP (ref 0–6)
ERYTHROCYTE [SEDIMENTATION RATE] IN BLOOD: 28 MM/HR — HIGH (ref 0–20)
GAS PNL BLDV: 138 MMOL/L — SIGNIFICANT CHANGE UP (ref 135–145)
GAS PNL BLDV: SIGNIFICANT CHANGE UP
GLUCOSE BLDV-MCNC: 93 MG/DL — SIGNIFICANT CHANGE UP (ref 70–99)
GLUCOSE SERPL-MCNC: 108 MG/DL — HIGH (ref 70–99)
HCO3 BLDV-SCNC: 28 MMOL/L — SIGNIFICANT CHANGE UP (ref 21–29)
HCT VFR BLD CALC: 38.3 % — SIGNIFICANT CHANGE UP (ref 34.5–45)
HCT VFR BLDA CALC: 39 % — SIGNIFICANT CHANGE UP (ref 39–50)
HGB BLD CALC-MCNC: 12.6 G/DL — SIGNIFICANT CHANGE UP (ref 11.5–15.5)
HGB BLD-MCNC: 12.3 G/DL — SIGNIFICANT CHANGE UP (ref 11.5–15.5)
INR BLD: 0.94 RATIO — SIGNIFICANT CHANGE UP (ref 0.88–1.16)
LACTATE BLDV-MCNC: 1.1 MMOL/L — SIGNIFICANT CHANGE UP (ref 0.7–2)
LYMPHOCYTES # BLD AUTO: 1.4 K/UL — SIGNIFICANT CHANGE UP (ref 1–3.3)
LYMPHOCYTES # BLD AUTO: 19.1 % — SIGNIFICANT CHANGE UP (ref 13–44)
MCHC RBC-ENTMCNC: 29 PG — SIGNIFICANT CHANGE UP (ref 27–34)
MCHC RBC-ENTMCNC: 32.2 GM/DL — SIGNIFICANT CHANGE UP (ref 32–36)
MCV RBC AUTO: 90 FL — SIGNIFICANT CHANGE UP (ref 80–100)
MONOCYTES # BLD AUTO: 0.6 K/UL — SIGNIFICANT CHANGE UP (ref 0–0.9)
MONOCYTES NFR BLD AUTO: 7.8 % — SIGNIFICANT CHANGE UP (ref 2–14)
NEUTROPHILS # BLD AUTO: 5.2 K/UL — SIGNIFICANT CHANGE UP (ref 1.8–7.4)
NEUTROPHILS NFR BLD AUTO: 69.8 % — SIGNIFICANT CHANGE UP (ref 43–77)
PCO2 BLDV: 50 MMHG — SIGNIFICANT CHANGE UP (ref 35–50)
PH BLDV: 7.38 — SIGNIFICANT CHANGE UP (ref 7.35–7.45)
PLATELET # BLD AUTO: 236 K/UL — SIGNIFICANT CHANGE UP (ref 150–400)
PO2 BLDV: 29 MMHG — SIGNIFICANT CHANGE UP (ref 25–45)
POTASSIUM BLDV-SCNC: 3.7 MMOL/L — SIGNIFICANT CHANGE UP (ref 3.5–5.3)
POTASSIUM SERPL-MCNC: 5.7 MMOL/L — HIGH (ref 3.5–5.3)
POTASSIUM SERPL-SCNC: 5.7 MMOL/L — HIGH (ref 3.5–5.3)
PROT SERPL-MCNC: 7.5 G/DL — SIGNIFICANT CHANGE UP (ref 6–8.3)
PROTHROM AB SERPL-ACNC: 10.7 SEC — SIGNIFICANT CHANGE UP (ref 10–12.9)
RBC # BLD: 4.25 M/UL — SIGNIFICANT CHANGE UP (ref 3.8–5.2)
RBC # FLD: 14 % — SIGNIFICANT CHANGE UP (ref 10.3–14.5)
SAO2 % BLDV: 46 % — LOW (ref 67–88)
SODIUM SERPL-SCNC: 139 MMOL/L — SIGNIFICANT CHANGE UP (ref 135–145)
WBC # BLD: 7.4 K/UL — SIGNIFICANT CHANGE UP (ref 3.8–10.5)
WBC # FLD AUTO: 7.4 K/UL — SIGNIFICANT CHANGE UP (ref 3.8–10.5)

## 2019-07-05 PROCEDURE — 85027 COMPLETE CBC AUTOMATED: CPT

## 2019-07-05 PROCEDURE — 84132 ASSAY OF SERUM POTASSIUM: CPT

## 2019-07-05 PROCEDURE — 73502 X-RAY EXAM HIP UNI 2-3 VIEWS: CPT

## 2019-07-05 PROCEDURE — 85014 HEMATOCRIT: CPT

## 2019-07-05 PROCEDURE — 85730 THROMBOPLASTIN TIME PARTIAL: CPT

## 2019-07-05 PROCEDURE — 85652 RBC SED RATE AUTOMATED: CPT

## 2019-07-05 PROCEDURE — 82947 ASSAY GLUCOSE BLOOD QUANT: CPT

## 2019-07-05 PROCEDURE — 85610 PROTHROMBIN TIME: CPT

## 2019-07-05 PROCEDURE — 73502 X-RAY EXAM HIP UNI 2-3 VIEWS: CPT | Mod: 26,RT

## 2019-07-05 PROCEDURE — 93971 EXTREMITY STUDY: CPT

## 2019-07-05 PROCEDURE — 82435 ASSAY OF BLOOD CHLORIDE: CPT

## 2019-07-05 PROCEDURE — 80053 COMPREHEN METABOLIC PANEL: CPT

## 2019-07-05 PROCEDURE — 84295 ASSAY OF SERUM SODIUM: CPT

## 2019-07-05 PROCEDURE — 72170 X-RAY EXAM OF PELVIS: CPT

## 2019-07-05 PROCEDURE — 99284 EMERGENCY DEPT VISIT MOD MDM: CPT

## 2019-07-05 PROCEDURE — 82803 BLOOD GASES ANY COMBINATION: CPT

## 2019-07-05 PROCEDURE — 73552 X-RAY EXAM OF FEMUR 2/>: CPT | Mod: 26,RT

## 2019-07-05 PROCEDURE — 83605 ASSAY OF LACTIC ACID: CPT

## 2019-07-05 PROCEDURE — 93971 EXTREMITY STUDY: CPT | Mod: 26

## 2019-07-05 PROCEDURE — 86140 C-REACTIVE PROTEIN: CPT

## 2019-07-05 PROCEDURE — 82330 ASSAY OF CALCIUM: CPT

## 2019-07-05 PROCEDURE — 73552 X-RAY EXAM OF FEMUR 2/>: CPT

## 2019-07-05 RX ORDER — OXYCODONE HYDROCHLORIDE 5 MG/1
5 TABLET ORAL ONCE
Refills: 0 | Status: DISCONTINUED | OUTPATIENT
Start: 2019-07-05 | End: 2019-07-05

## 2019-07-05 RX ORDER — AMLODIPINE BESYLATE 2.5 MG/1
10 TABLET ORAL ONCE
Refills: 0 | Status: DISCONTINUED | OUTPATIENT
Start: 2019-07-05 | End: 2019-07-05

## 2019-07-05 RX ORDER — LISINOPRIL 2.5 MG/1
10 TABLET ORAL ONCE
Refills: 0 | Status: DISCONTINUED | OUTPATIENT
Start: 2019-07-05 | End: 2019-07-05

## 2019-07-05 RX ORDER — ACETAMINOPHEN 500 MG
975 TABLET ORAL ONCE
Refills: 0 | Status: COMPLETED | OUTPATIENT
Start: 2019-07-05 | End: 2019-07-05

## 2019-07-05 RX ADMIN — Medication 975 MILLIGRAM(S): at 20:00

## 2019-07-05 RX ADMIN — Medication 975 MILLIGRAM(S): at 19:14

## 2019-07-05 RX ADMIN — OXYCODONE HYDROCHLORIDE 5 MILLIGRAM(S): 5 TABLET ORAL at 19:15

## 2019-07-05 RX ADMIN — Medication 0.1 MILLIGRAM(S): at 22:09

## 2019-07-05 RX ADMIN — OXYCODONE HYDROCHLORIDE 5 MILLIGRAM(S): 5 TABLET ORAL at 20:00

## 2019-07-05 NOTE — ED PROVIDER NOTE - NSFOLLOWUPINSTRUCTIONS_ED_ALL_ED_FT
Use your walker for ambulation.  Keep continue your current medications, including pain medication.  Follow up with your orthopedist Dr. Be, call Monday for an appointment.  Return for any concerns or worsening symptoms.

## 2019-07-05 NOTE — ED ADULT NURSE REASSESSMENT NOTE - NS ED NURSE REASSESS COMMENT FT1
Patient takes out her IV and screams for help because she started to bleed after that. patient is given a gauze bandage with tape to stop the bleeding.

## 2019-07-05 NOTE — ED ADULT NURSE REASSESSMENT NOTE - NS ED NURSE REASSESS COMMENT FT1
Care assumed of patient at this time. Patient takes pain medicine. Patient states "Tylenol won't do anything, I ate half a bottle yesterday" Patient is advised that tylenol is toxic to the liver and overdose can happen and that she should only take tylenol as directed.

## 2019-07-05 NOTE — ED PROVIDER NOTE - PHYSICAL EXAMINATION
NAD, Hypertensive, Afebrile, No spinal midline tender. Lungs clear. ABD sift, non tender. + right hip tender, diminished ROMS without warmth and cellulitis. Mild swelling on RLE with calf tender.

## 2019-07-05 NOTE — ED PROVIDER NOTE - ATTENDING CONTRIBUTION TO CARE
I have seen and evaluated this patient with the advance practice clinician.   I agree with the findings  unless other wise stated. I have amended notes where needed.  After my face to face bedside evaluation, I am noting: right hip infection 5/2019 c/o worsening right hip pain sine one week ago. Denies injury. Pt stated she felt worsening pain to right hip and took Tylenol without relief. Denies fever, chills or cough/ congestion. Denies CP/SOB/ABD pain or N/V. Denies urinary or bowel problems. Denies headache, dizziness or neck pain. Denies worsening back pain. Denies any changes of sensory or weakness to extremities. NAD, Hypertensive, Afebrile, No spinal midline tender. Lungs clear. ABD sift, non tender. + right hip tender, diminished ROMS without warmth and cellulitis. Mild swelling on RLE with calf tender. xrays and labs reviewed will have f/u with her Ortho surgeon in 1 week Pt ambulating in ED unassisted --Benjie

## 2019-07-05 NOTE — ED PROVIDER NOTE - CARE PROVIDER_API CALL
Megan Be)  Orthopaedic Surgery  [address], [address]  [place] [address]  Phone: [phone]  Fax: [phone]  Follow Up Time:

## 2019-07-05 NOTE — ED ADULT NURSE REASSESSMENT NOTE - NS ED NURSE REASSESS COMMENT FT1
Patient is okay to d/c at this time as per doctor Waldrop. Patient has taken out her IV, gotten dressed and is walking with the walker to the ED exit. patient agrees to sign d/c instructions on her way out. Patient refuses to wait to recheck BP again.

## 2019-07-05 NOTE — ED ADULT NURSE NOTE - NSIMPLEMENTINTERV_GEN_ALL_ED
Implemented All Fall Risk Interventions:  [place] to call system. Call bell, personal items and telephone within reach. Instruct patient to call for assistance. Room bathroom lighting operational. Non-slip footwear when patient is off stretcher. Physically safe environment: no spills, clutter or unnecessary equipment. Stretcher in lowest position, wheels locked, appropriate side rails in place. Provide visual cue, wrist band, yellow gown, etc. Monitor gait and stability. Monitor for mental status changes and reorient to person, place, and time. Review medications for side effects contributing to fall risk. Reinforce activity limits and safety measures with patient and family.

## 2019-07-05 NOTE — ED PROVIDER NOTE - PROGRESS NOTE DETAILS
Pt missed today's medications including bp meds (asymptomatic) and informed pt to take all her medications as soon as she gets home. Ambulatory with her walker without difficulties.

## 2019-07-05 NOTE — ED PROVIDER NOTE - OBJECTIVE STATEMENT
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 Denies injury. Pt stated she felt worsening pain to right hip and took Tylenol without relief. Denies fever, chills or cough/ congestion. Denies CP/SOB/ABD pain or N/V. Denies urinary or bowel problems. Denies headache, dizziness or neck pain. Denies worsening back pain. Denies any changes of sensory or weakness to extremities.

## 2019-07-05 NOTE — ED PROVIDER NOTE - CLINICAL SUMMARY MEDICAL DECISION MAKING FREE TEXT BOX
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 Denies injury. Pt stated she felt worsening pain to right hip and took Tylenol without relief. Denies fever, chills or cough/ congestion. Denies CP/SOB/ABD pain or N/V. Denies urinary or bowel problems. Denies headache, dizziness or neck pain. Denies worsening back pain. Denies any changes of sensory or weakness to extremities. NAD, Hypertensive, Afebrile, No spinal midline tender. Lungs clear. ABD sift, non tender. + right hip tender, diminished ROMS without warmth and cellulitis. Mild swelling on RLE with calf tender.

## 2019-07-05 NOTE — ED ADULT NURSE REASSESSMENT NOTE - NS ED NURSE REASSESS COMMENT FT1
pt hypertensive upon discharge. pt reports she "Takes clonidine at times." has not taken it today. MD aware.

## 2019-07-05 NOTE — ED ADULT NURSE NOTE - OBJECTIVE STATEMENT
73 year old female presents to the ED complaining of right hip pain x 4 days. As per pt she had the hip replaced a year and a half ago and had to have it cleaned out with hardware replacement over easter 2019. Pt is A&O x 4, VSS, afebrile, ambulating with a walker with difficulty. Pt denies fever, chills, NVD, SOB, or chest pain.

## 2019-07-06 LAB — CRP SERPL-MCNC: 2.75 MG/DL — HIGH (ref 0–0.4)

## 2019-07-07 NOTE — ED POST DISCHARGE NOTE - DETAILS
Denies fever, or chills. Admits to persistent right hip pain, no change. I instructed her to follow up with her Orthopedist, she plans to follow up with Ortho and ID this week. I prompted her to return to ED for fever, chills, weakness. - Bakari Gaffney PA-C

## 2019-07-15 ENCOUNTER — EMERGENCY (EMERGENCY)
Facility: HOSPITAL | Age: 74
LOS: 1 days | Discharge: LEFT BEFORE DISCHARGE | End: 2019-07-15
Attending: STUDENT IN AN ORGANIZED HEALTH CARE EDUCATION/TRAINING PROGRAM
Payer: MEDICARE

## 2019-07-15 VITALS
DIASTOLIC BLOOD PRESSURE: 66 MMHG | HEART RATE: 82 BPM | RESPIRATION RATE: 18 BRPM | HEIGHT: 62 IN | SYSTOLIC BLOOD PRESSURE: 141 MMHG | OXYGEN SATURATION: 97 % | WEIGHT: 125 LBS

## 2019-07-15 VITALS
OXYGEN SATURATION: 99 % | RESPIRATION RATE: 16 BRPM | HEART RATE: 78 BPM | SYSTOLIC BLOOD PRESSURE: 149 MMHG | DIASTOLIC BLOOD PRESSURE: 71 MMHG | TEMPERATURE: 98 F

## 2019-07-15 DIAGNOSIS — Z96.641 PRESENCE OF RIGHT ARTIFICIAL HIP JOINT: Chronic | ICD-10-CM

## 2019-07-15 DIAGNOSIS — Z98.89 OTHER SPECIFIED POSTPROCEDURAL STATES: Chronic | ICD-10-CM

## 2019-07-15 PROCEDURE — 99283 EMERGENCY DEPT VISIT LOW MDM: CPT

## 2019-07-15 RX ORDER — MORPHINE SULFATE 50 MG/1
15 CAPSULE, EXTENDED RELEASE ORAL ONCE
Refills: 0 | Status: DISCONTINUED | OUTPATIENT
Start: 2019-07-15 | End: 2019-07-15

## 2019-07-15 RX ADMIN — MORPHINE SULFATE 15 MILLIGRAM(S): 50 CAPSULE, EXTENDED RELEASE ORAL at 10:26

## 2019-07-15 NOTE — ED PROVIDER NOTE - SKIN, MLM
Skin normal color for race, warm, dry and intact. No evidence of rash. INCISION SITE WELL HEALED WITHOUT ERYTHEMA OR SWELLING

## 2019-07-15 NOTE — ED PROVIDER NOTE - PHYSICAL EXAMINATION
MSK: atraumatic RLE without shortening or external rotation, able to flex and extend RLE with some discomfort, pain with internal rotation of hip

## 2019-07-15 NOTE — ED PROVIDER NOTE - ATTENDING CONTRIBUTION TO CARE
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 Reports was having trouble walking so called an ambulance. Denies trauma. Recently seen here 10 days ago with similar story with negative workup including imaging and labs. Patient denies fevers, chills, cp, sob, abd pain, n/v, urianary/bowel incontinence, numbness, tingling. Requesting IV morphine on arrival and then asking to leave as she felt morphine would not help. Reports she is planning to see her orthopedic surgeon soon. Istop showing she recently received narcotic medication 2 weeks ago for 15 day supply.     Vital Signs Stable  Gen: well appearing, NAD  HEENT: MMM, neck supple  Cardiac: regular rate rhythm, normal S1S2  Chest: CTA BL, no wheezes or crackles  Abdomen: normal BS, soft, non tender non distended  Extremity: right hip with scar, not warm to touch, ROM intact, able to bear weight  Skin: no rash  Neuro: grossly normal    AP: atraumatic hip pain, chronic in nature. low concern for septic joint. low concern for hardware malfunction. likely chronic pain, offered pain medications here. recommend outpatient f/u with orthopedics and pain management.

## 2019-07-15 NOTE — ED ADULT NURSE NOTE - OBJECTIVE STATEMENT
74 y/o female A&Ox4, PMH right hip replacement, BIBEMS from outpt MD office for right hip pain. Pt states she was at her orthopedic MD when she left his office and states she "was in too much pain to go home." Pt called EMS from in front of MD office after she states she was "referred ti=o ER by outpt MD." Pt upon arriving to ED stated to MD and PA "I want IV medication, why won't you just give me IV and send me home?" Pt offered oral pain medication and verbalized acceptance of treatment plan. Upon further assessment, airway patent and intact, denies chest pain/SOB. ABD soft nontender, denies n/v/d. Denies blood in urine and/or stool. Skin intact. Peripheral pulses strong and normal baseline sensation present x4. Safety and comfort measures maintained. 72 y/o female A&Ox4, PMH ETOH abouse, HTN, CAD, right hip replacement, BIBEMS from outpt MD office for right hip pain. Pt states she was at her orthopedic MD when she left his office and states she "was in too much pain to go home." Pt called EMS from in front of MD office after she states she was "referred ti=o ER by outpt MD." Pt upon arriving to ED stated to MD and PA "I want IV medication, why won't you just give me IV and send me home?" Pt offered oral pain medication and verbalized acceptance of treatment plan. Upon further assessment, airway patent and intact, denies chest pain/SOB. ABD soft nontender, denies n/v/d. Denies blood in urine and/or stool. Skin intact. Peripheral pulses strong and normal baseline sensation present x4. Safety and comfort measures maintained.

## 2019-07-15 NOTE — ED ADULT NURSE NOTE - NSIMPLEMENTINTERV_GEN_ALL_ED
Implemented All Fall Risk Interventions:  [place] to call system. Call bell, personal items and telephone within reach. Instruct patient to call for assistance. Room bathroom lighting operational. Non-slip footwear when patient is off stretcher. Physically safe environment: no spills, clutter or unnecessary equipment. Stretcher in lowest position, wheels locked, appropriate side rails in place. Provide visual cue, wrist band, yellow gown, etc. Monitor gait and stability. Monitor for mental status changes and reorient to person, place, and time. Review medications for side effects contributing to fall risk. Reinforce activity limits and safety measures with patient and family.

## 2019-07-15 NOTE — ED ADULT NURSE REASSESSMENT NOTE - NS ED NURSE REASSESS COMMENT FT1
Pt verbalized to RN "I want IV pain medication. If you're not going to give me pain medication, I'm just going to leave." Avis BOYCE and Lauren HA made aware. Pt made aware of treatment plan as per MD and PA, verbalized understanding. Pt verbalized agreement to take oral pain medication before trying IV medication as instructed by MD.

## 2019-07-15 NOTE — ED ADULT NURSE NOTE - CHPI ED NUR SYMPTOMS NEG
no difficulty bearing weight/no abrasion/no tingling/no weakness/no fever/no bruising/no deformity/no numbness

## 2019-07-15 NOTE — ED PROVIDER NOTE - NSFOLLOWUPINSTRUCTIONS_ED_ALL_ED_FT
Follow up with your Primary Care Physician within the next 2-3 days  Bring a copy of your test results with you to your appointment  Continue your current medication regimen  Take Tylenol 650mg every 6 hours as needed for pain   Follow up with your orthopedist within 72 hours  Establish care with pain management physician   Return to the Emergency Room if you experience new or worsening symptoms numbness, weakness, chest pain, shortness of breath, swelling, difficulty ambulating, redness of hip

## 2019-07-15 NOTE — ED PROVIDER NOTE - OBJECTIVE STATEMENT
74 y/o female PMHx CAD s/p stent, COPD not on home O2, HTN, HLD, ETOH abuse, right hip replacement s/p multiple vision and spacer c/b MRSA right hip now presenting to the ED for worsening right hip pain x1 month. Patient has be taking Tylenol with codeine with no relief of symptoms and last dose was this morning. Patient ambulates with walker at baseline. Has not been doing physical therapy. Patient able to ambulate but reporting 10/10 pain with ambulation. Was outside her ID office when she called EMS to bring her to the hospital. Patient denied fever, chills, erythema or swelling to right hip, numbness, paresthesias, headache, back pain, fall or trauma. Patient seen in [facility] ED 7/5/19 for same complaint ESR 28 CRP 2.75, XR right hip unchanged and had negative DVT study. Patient requesting IV analgesia only

## 2019-07-15 NOTE — ED ADULT NURSE REASSESSMENT NOTE - NS ED NURSE REASSESS COMMENT FT1
Pt eloped. Pt ambulated to nursing station using her walker and stated to Avis BOYCE, Lauren HA "I'm going to call my transportation service to take me home." Pt is alert and fully capable of making her own medical decisions. Pt advised to be observed after receiving morphine, made aware of possible side effects. Pt verbalized understanding and refused to sty to be monitored. Pt left prior to signing any paperwork. Assisted to triage area by RN to ensure patient safety.

## 2019-07-15 NOTE — ED CLERICAL - NS ED CLERK NOTE PRE-ARRIVAL INFORMATION; ADDITIONAL PRE-ARRIVAL INFORMATION
CC/Reason For referral: patient has a history of hip issues, currently has hip pain  Preferred Consultant(if applicable):  Who admits for you (if needed):  Do you have documents you would like to fax over?  Would you still like to speak to an ED attending?

## 2019-07-15 NOTE — ED PROVIDER NOTE - PROGRESS NOTE DETAILS
LELAND Mohan: ISTOP Reference #: [id number]   06/17/2019 06/30/2019 acetaminophen-cod #4 tablet  60 15 Navdeep Montez MD     06/17/2019 06/23/2019 diphenoxylate-atropine 2.5-0.025 mg tablet  12 3 Navdeep Montez MD     06/17/2019 06/17/2019 acetaminophen-cod #4 tablet  60 15 Navdeep Montez MD     06/17/2019 06/17/2019 diphenoxylate-atropine 2.5-0.025 mg tablet  12 3 Navdeep Montez MD LELAND Mohan: patient received morphine 15mg PO now at the desk with walker requesting to leave against medical advice. made patient aware would like to monitor patient after receiving morphine but insisting on leaving. Patient is A&Ox4 has the capacity to make medical decisions and understands the risks of leaving against medical advice including falling, worsening disability, reaction morphine, hypotension. patient left before signing AMA paperwork therefore pt eloped extensive discussion with patient regarding management. patient alternating between wanting to leave and go home and asking for more pain medicine. shared decision making with patient to take PO morphine. prior to administration, patient told she needed to be observed afterwards for a few hours. patient understood. ~20 mins after receiving medication, patient asking to leave, trying to walk out and leave, getting out of bed on own and ambulating without assistance. patient informed she would have to leave ama. patient leaving prior to signing paperwork.

## 2019-08-06 ENCOUNTER — APPOINTMENT (OUTPATIENT)
Dept: ORTHOPEDIC SURGERY | Facility: CLINIC | Age: 74
End: 2019-08-06
Payer: MEDICARE

## 2019-08-06 DIAGNOSIS — Z96.641 PRESENCE OF RIGHT ARTIFICIAL HIP JOINT: ICD-10-CM

## 2019-08-06 PROCEDURE — 73552 X-RAY EXAM OF FEMUR 2/>: CPT | Mod: RT

## 2019-08-06 PROCEDURE — 99215 OFFICE O/P EST HI 40 MIN: CPT

## 2019-08-07 ENCOUNTER — APPOINTMENT (OUTPATIENT)
Dept: RADIOLOGY | Facility: CLINIC | Age: 74
End: 2019-08-07
Payer: MEDICARE

## 2019-08-07 ENCOUNTER — OUTPATIENT (OUTPATIENT)
Dept: OUTPATIENT SERVICES | Facility: HOSPITAL | Age: 74
LOS: 1 days | End: 2019-08-07
Payer: MEDICARE

## 2019-08-07 ENCOUNTER — APPOINTMENT (OUTPATIENT)
Dept: RADIOLOGY | Facility: CLINIC | Age: 74
End: 2019-08-07

## 2019-08-07 DIAGNOSIS — Z96.641 PRESENCE OF RIGHT ARTIFICIAL HIP JOINT: Chronic | ICD-10-CM

## 2019-08-07 DIAGNOSIS — Z96.641 PRESENCE OF RIGHT ARTIFICIAL HIP JOINT: ICD-10-CM

## 2019-08-07 DIAGNOSIS — Z98.89 OTHER SPECIFIED POSTPROCEDURAL STATES: Chronic | ICD-10-CM

## 2019-08-07 PROCEDURE — 77002 NEEDLE LOCALIZATION BY XRAY: CPT

## 2019-08-07 PROCEDURE — 73502 X-RAY EXAM HIP UNI 2-3 VIEWS: CPT

## 2019-08-07 PROCEDURE — 77002 NEEDLE LOCALIZATION BY XRAY: CPT | Mod: 26

## 2019-08-07 PROCEDURE — 20610 DRAIN/INJ JOINT/BURSA W/O US: CPT

## 2019-08-07 PROCEDURE — 20610 DRAIN/INJ JOINT/BURSA W/O US: CPT | Mod: RT

## 2019-08-07 PROCEDURE — 73502 X-RAY EXAM HIP UNI 2-3 VIEWS: CPT | Mod: 26,RT

## 2019-08-08 NOTE — ED ADULT TRIAGE NOTE - NS ED NURSE AMBULANCES
[facility] CARDIOLOGY OFFICE FOLLOW UP NOTE       Date of Encounter: 8/14/2019   YOB: 1949   MRN: [id number]   Primary Care Provider: MILY Rowe     Reason for visit:  Chief Complaint   Patient presents with   • Follow-up     3 month follow up   • Cardiac Valve Problem     mitral valve repair 4/12/19   • Heart Problem     diastolic dysfunction         ASSESSMENT AND PLAN       Assessment:   1. S/P mitral valve repair    2. First degree AV block    3. Diastolic dysfunction    4. Chest pressure    5. Shortness of breath      Orders Placed This Encounter   • XR CHEST PA AND LATERAL - 2 view   • SEDIMENTATION RATE MIHN   • ECHO Limited   • metoPROLOL tartrate (LOPRESSOR) 25 MG tablet     Return in about 3 months (around 11/14/2019) for Mitral valve repair.    RECOMMENDATIONS:  1. Chest x-ray and sedimentation rate today to evaluate chest pressure and shortness of breath (mild) postop.  2. Limited echocardiogram to evaluate mitral valve and LV function.  3. Okay for her to go back to her normal activities with lifting restriction of 40 pounds at this point.  4. Call as needed or for new or changing symptoms.  5. Reduce aspirin back to 81 mg daily.  6. Follow-up in 3 months.    Patient was seen and evaluated in conjunction with with Dr. Kennedy.         HISTORY OF PRESENT ILLNESS       Rose Marie Juarez is a 69 year old female who presents in follow-up. History of severe mitral valve regurgitation due to P2 ruptured cords with flail segment. Underwent mitral valve repair 4/12/19. Last month had contacted our office with concerns of some chest pressure when \"overdoing it, \"some tenderness to the incision in her chest.    Today's discussion: Today the patient continues to described some intermittent chest pressure. This can occur with \"moving around more.\" Notices it most often at night while at rest. His not occur daily. This does not limit her from doing what she would like to do. She does  notice that she has some degree of shortness of breath particularly on hot humid days when she is being active. She did not notice as prior to surgery. Denies orthopnea, nocturnal dyspnea, peripheral edema, or dizziness. Occasionally aware of her heart beating at night time.    Questions whether she needs to take fish oil or probiotic. Do not feel as though this is necessary.    Patient would like to get back to riding her motorcycle as a passenger as well as cutting the grass. At this point this should be okay.    We also okay with her donating blood.        Recent Labs   Lab 19  1625   CHOLESTEROL 183   HDL 69   CALCULATED LDL 90   TRIGLYCERIDE 119           I have reviewed the patient's past medical history, surgical history, family history, social history, allergies, and current medications in the electronic medical record. I verify that they are complete and accurate.       SOCIAL HISTORY     Social History     Tobacco Use   Smoking Status Former Smoker   • Types: Cigarettes   • Last attempt to quit: 1973   • Years since quittin.6   Smokeless Tobacco Never Used   Tobacco Comment    Patient with remote smoking history only, no information packet needed.          ALLERGIES     ALLERGIES:  No Known Allergies     CURRENT MEDICATIONS       Current Outpatient Medications   Medication Sig Dispense Refill   • metoPROLOL tartrate (LOPRESSOR) 25 MG tablet Take 0.5 tablets by mouth every 12 hours. 30 tablet 10   • ascorbic acid (VITAMIN C) 500 MG tablet Take 1 tablet by mouth daily. 90 tablet 0   • aspirin 325 MG tablet Take 1 tablet by mouth daily. 30 tablet 2   • colchicine (COLCRYS) 0.6 MG tablet Take 1 tablet by mouth daily. 30 tablet 0   • ferrous sulfate 325 (65 FE) MG tablet Take 1 tablet by mouth daily (with breakfast). 90 tablet 0   • acetaminophen (TYLENOL) 325 MG tablet Take 2 tablets by mouth every 4 hours as needed for Pain. Not to exceed 1000mg every 6 hours or 4000mg every 24hours from all  sources 120 tablet 0   • Cyanocobalamin (VITAMIN B 12 PO) Take 1 tablet by mouth daily.      • Valacyclovir HCl 1000 MG Tab TAKE 2 TABLETS TWICE A DAY AS NEEDED FOR OUTBREAK 8 tablet 2   • Multiple Vitamins-Minerals (DAILY MULTI PO) Take 1 tablet by mouth daily.     • Calcium Carbonate-Vitamin D (CALCIUM + D PO) Take 1 tablet by mouth daily.      • Cholecalciferol (VITAMIN D-3) 1000 units Cap Take 1,000 Units by mouth daily.        No current facility-administered medications for this visit.           REVIEW OF SYSTEMS     A review of systems was performed and relevant findings are included in the HPI.    Physical Exam     Visit Vitals  /68   Pulse 62   Ht 5' 3\" (1.6 m)   Wt 63.8 kg   SpO2 98%   BMI 24.91 kg/m²       Wt Readings from Last 4 Encounters:   08/14/19 63.8 kg   05/16/19 62.3 kg   05/10/19 62.7 kg   05/01/19 62.3 kg       Constitutional:  White  female in no acute distress.  Skin: Warm and dry. No rash.    HEENT:  Normocephalic. Atraumatic. Conjunctiva pink, sclerae anicteric.  Mucous membranes moist.  Neck:  No JVD.  Carotids 2+ without bruit.  Lungs:   Breath sounds clear to auscultation throughout lung fields. No wheezing, rhonchi, or rales.  Heart: Regular rate and rhythm without ectopy. Normal S1 and split second sound. Unable to clearly auscultate murmur, S3, or S4. No rub or click.  Abdomen:  Soft, rounded, non-tender. No masses. Aorta in the midline and not enlarged. No hepatomegaly. No splenomegaly.  Extremities:  No pitting edema. 2+ radial, dorsalis pedis, and posterior tibial pulses equal bilaterally.   Neurologic:  Alert & oriented x 3. Normal affect and mood.  No gross motor or sensory deficits.      OTHER PERTINENT LABS      Lab Results   Component Value Date    POTASSIUM 4.3 05/01/2019    SODIUM 138 05/01/2019    BUN 17 05/01/2019    CREATININE 0.76 05/01/2019    HGBA1C 5.6 04/03/2019    GLUCOSE 97 05/01/2019    HCT 31.8 (L) 05/01/2019    HGB 10.2 (L) 05/01/2019     05/01/2019     TSH 6.141 (H) 04/03/2019    INR 1.2 04/14/2019         IMAGING       ECHOCARDIOGRAM 4/14/19 reviewed:  Limited echo: 2D, color flow and Doppler performed  Reviewed the TTE dated 2/5/2019 and the LYDIA dated 3/6/2019  Patient is S/P 30 mm MV rechord 3D ring with 2 NeoChord implantation 4/12/2019  Valve is well visualized  No MR  Mean gradient 4.5 mm Hg  Post op paradoxical septal motion  LVEF = 50%  Trace AR      Dr. Kennedy was present in the office suite at the time of today's visit and was available for consult.       Eron Harmon NP Cardiology.  8/14/2019  9:32 AM   [facility]

## 2019-08-09 PROBLEM — Z96.641 STATUS POST TOTAL REPLACEMENT OF RIGHT HIP: Status: ACTIVE | Noted: 2018-06-22

## 2019-08-09 NOTE — DISCUSSION/SUMMARY
[de-identified] : Patient has a right hip spacer. She was being treated with IV antibiotics per mL. She is now currently on parenteral antibiotics. At this time it eloped an aspiration of the right hip with ESR and CRPs the mildly elevated. Come back normal essentially clear\par \par She wants her hip reimplanted.\par \par At this time it is a highly risky undertaking given the fact the patient had MRSA. We'll obtain an aspiration of the hip. Patient is also advised to see infectious disease doctor. All loose and benefits of revision was discussed with the patient. All the possible risks including possible infection possible bleeding possible medical complications possible injury to surrounding structures was discussed with the patient. She also understands that she will probably need to be on chronic p.o. suppression with antibiotics. She understands all this.\par \par If the aspiration cultures are negative patient will be scheduled for a right hip revision arthroplasty. She understands and agrees with the plan

## 2019-08-09 NOTE — HISTORY OF PRESENT ILLNESS
[de-identified] : status post right hip explant and spacer placement 4/23/19\par Patient is walking with a walker. She is having pain. She is weightbearing fully with a walker. She wants to know when he can bring up onto this [2] : a minimum pain level of 2/10 [8] : a maximum pain level of 8/10 [Acetaminophen] : not relieved by acetaminophen [Exercise Regimen] : not relieved by exercise regimen [NSAIDs] : not relieved by nonsteroidal anti-inflammatory drugs

## 2019-08-09 NOTE — PHYSICAL EXAM
[Walker] : ambulates with walker [Antalgic] : antalgic [Normal RUE] : Right Upper Extremity: No scars, rashes, lesions, ulcers, skin intact [Normal Finger/nose] : finger to nose coordination [Poor Appearance] : well-appearing [Obese] : not obese [Acute Distress] : not in acute distress [Normal] : no peripheral adenopathy appreciated [de-identified] : Patient appears stated age in no acute respiratory distress. Patient is alert oriented x3. Patient has normal mood and affect. Gait and can't\par Bilateral knee exam\par Range of motion of the knee is 0-120°. \par Skin is normal.  No rash.\par There is no effusion. No medial or lateral joint line tenderness. No swelling, no pitting edema.\par Overall alignment of the knee is then slight varus. Good anterior posterior stability. Firm endpoints on anterior and posterior drawer. \par Medial lateral stability is intact. Firm endpoint on medial and lateral stress testing .\par Leroy test is negative.\par Quadriceps strength 5/ 5. There is no loss of muscle volume in the thigh. \par Good anterior posterior and mediolateral stability.\par Sensation in the extremities intact. \par Discrimination is intact. Good DP and PT pulses.\par 		\par \par Bilateral hip exam\par On inspection of the hip shows skin is normal. No evidence of rash. \par No loss of muscle.  Abductor strength is 5 out of 5. Hip flexor strength is 5.\par Range of motion of the hip at 90° flexion internal rotation is 15° external rotation is 30° pain-free. \par Hip has good stability in anterior and posterior direction. \par On lateral decubitus  examination there is no tenderness in the greater trochanter. \par Lower Extremity Examination \par Bilateral lower extremity skin is normal. There is no rash. There is no edema and lymphadenopathy.  DP and PT pulses intact. Sensation is intact.\par Right hip incision completely healed\par  [de-identified] : X-rays of the right femur show that the spacer and good alignment 2v

## 2019-08-21 DIAGNOSIS — Z96.649 INFECTION AND INFLAMMATORY REACTION DUE TO OTHER INTERNAL JOINT PROSTHESIS, INITIAL ENCOUNTER: ICD-10-CM

## 2019-08-21 DIAGNOSIS — T84.59XA INFECTION AND INFLAMMATORY REACTION DUE TO OTHER INTERNAL JOINT PROSTHESIS, INITIAL ENCOUNTER: ICD-10-CM

## 2019-08-22 ENCOUNTER — APPOINTMENT (OUTPATIENT)
Dept: ORTHOPEDIC SURGERY | Facility: CLINIC | Age: 74
End: 2019-08-22

## 2019-08-23 PROBLEM — T84.59XA PROSTHETIC HIP INFECTION, INITIAL ENCOUNTER: Status: ACTIVE | Noted: 2019-02-13

## 2019-09-03 NOTE — ED PROVIDER NOTE - DISPOSITION TYPE
Per chart review, patient has discussed transitioning from warfarin to Xarelto with cardiology.  Attempted to reach patient to discuss transition, no answer to call, no VM available.  Will attempt to reach her at a later time.  Rizwan Lipscomb, PharmD, BCACP     ADMIT

## 2019-09-11 ENCOUNTER — INPATIENT (INPATIENT)
Facility: HOSPITAL | Age: 74
LOS: 7 days | Discharge: HOME | DRG: 467 | End: 2019-09-19
Attending: ORTHOPAEDIC SURGERY | Admitting: ORTHOPAEDIC SURGERY
Payer: MEDICARE

## 2019-09-11 VITALS
HEART RATE: 74 BPM | RESPIRATION RATE: 17 BRPM | DIASTOLIC BLOOD PRESSURE: 55 MMHG | SYSTOLIC BLOOD PRESSURE: 144 MMHG | TEMPERATURE: 98 F | OXYGEN SATURATION: 99 %

## 2019-09-11 DIAGNOSIS — M25.551 PAIN IN RIGHT HIP: ICD-10-CM

## 2019-09-11 DIAGNOSIS — Z98.89 OTHER SPECIFIED POSTPROCEDURAL STATES: Chronic | ICD-10-CM

## 2019-09-11 DIAGNOSIS — Z96.641 PRESENCE OF RIGHT ARTIFICIAL HIP JOINT: Chronic | ICD-10-CM

## 2019-09-11 LAB
ALBUMIN SERPL ELPH-MCNC: 4.2 G/DL — SIGNIFICANT CHANGE UP (ref 3.3–5)
ALP SERPL-CCNC: 138 U/L — HIGH (ref 40–120)
ALT FLD-CCNC: 12 U/L — SIGNIFICANT CHANGE UP (ref 10–45)
ANION GAP SERPL CALC-SCNC: 14 MMOL/L — SIGNIFICANT CHANGE UP (ref 5–17)
APPEARANCE UR: CLEAR — SIGNIFICANT CHANGE UP
APTT BLD: 25.8 SEC — LOW (ref 27.5–36.3)
AST SERPL-CCNC: 16 U/L — SIGNIFICANT CHANGE UP (ref 10–40)
BASOPHILS # BLD AUTO: 0 K/UL — SIGNIFICANT CHANGE UP (ref 0–0.2)
BASOPHILS NFR BLD AUTO: 0.3 % — SIGNIFICANT CHANGE UP (ref 0–2)
BILIRUB SERPL-MCNC: 0.1 MG/DL — LOW (ref 0.2–1.2)
BILIRUB UR-MCNC: NEGATIVE — SIGNIFICANT CHANGE UP
BLD GP AB SCN SERPL QL: NEGATIVE — SIGNIFICANT CHANGE UP
BUN SERPL-MCNC: 26 MG/DL — HIGH (ref 7–23)
CALCIUM SERPL-MCNC: 9 MG/DL — SIGNIFICANT CHANGE UP (ref 8.4–10.5)
CHLORIDE SERPL-SCNC: 102 MMOL/L — SIGNIFICANT CHANGE UP (ref 96–108)
CO2 SERPL-SCNC: 25 MMOL/L — SIGNIFICANT CHANGE UP (ref 22–31)
COLOR SPEC: YELLOW — SIGNIFICANT CHANGE UP
CREAT SERPL-MCNC: 0.97 MG/DL — SIGNIFICANT CHANGE UP (ref 0.5–1.3)
DIFF PNL FLD: NEGATIVE — SIGNIFICANT CHANGE UP
EOSINOPHIL # BLD AUTO: 0.1 K/UL — SIGNIFICANT CHANGE UP (ref 0–0.5)
EOSINOPHIL NFR BLD AUTO: 1 % — SIGNIFICANT CHANGE UP (ref 0–6)
ETHANOL SERPL-MCNC: SIGNIFICANT CHANGE UP MG/DL (ref 0–10)
GLUCOSE SERPL-MCNC: 96 MG/DL — SIGNIFICANT CHANGE UP (ref 70–99)
GLUCOSE UR QL: NEGATIVE — SIGNIFICANT CHANGE UP
HCT VFR BLD CALC: 41.8 % — SIGNIFICANT CHANGE UP (ref 34.5–45)
HGB BLD-MCNC: 13.6 G/DL — SIGNIFICANT CHANGE UP (ref 11.5–15.5)
INR BLD: 0.97 RATIO — SIGNIFICANT CHANGE UP (ref 0.88–1.16)
KETONES UR-MCNC: NEGATIVE — SIGNIFICANT CHANGE UP
LEUKOCYTE ESTERASE UR-ACNC: ABNORMAL
LYMPHOCYTES # BLD AUTO: 1 K/UL — SIGNIFICANT CHANGE UP (ref 1–3.3)
LYMPHOCYTES # BLD AUTO: 15.1 % — SIGNIFICANT CHANGE UP (ref 13–44)
MCHC RBC-ENTMCNC: 30.2 PG — SIGNIFICANT CHANGE UP (ref 27–34)
MCHC RBC-ENTMCNC: 32.5 GM/DL — SIGNIFICANT CHANGE UP (ref 32–36)
MCV RBC AUTO: 93.1 FL — SIGNIFICANT CHANGE UP (ref 80–100)
MONOCYTES # BLD AUTO: 0.4 K/UL — SIGNIFICANT CHANGE UP (ref 0–0.9)
MONOCYTES NFR BLD AUTO: 6.1 % — SIGNIFICANT CHANGE UP (ref 2–14)
NEUTROPHILS # BLD AUTO: 5.4 K/UL — SIGNIFICANT CHANGE UP (ref 1.8–7.4)
NEUTROPHILS NFR BLD AUTO: 77.5 % — HIGH (ref 43–77)
NITRITE UR-MCNC: NEGATIVE — SIGNIFICANT CHANGE UP
PH UR: 6 — SIGNIFICANT CHANGE UP (ref 5–8)
PLATELET # BLD AUTO: 268 K/UL — SIGNIFICANT CHANGE UP (ref 150–400)
POTASSIUM SERPL-MCNC: 4.1 MMOL/L — SIGNIFICANT CHANGE UP (ref 3.5–5.3)
POTASSIUM SERPL-SCNC: 4.1 MMOL/L — SIGNIFICANT CHANGE UP (ref 3.5–5.3)
PROT SERPL-MCNC: 7.8 G/DL — SIGNIFICANT CHANGE UP (ref 6–8.3)
PROT UR-MCNC: ABNORMAL
PROTHROM AB SERPL-ACNC: 11 SEC — SIGNIFICANT CHANGE UP (ref 10–12.9)
RBC # BLD: 4.49 M/UL — SIGNIFICANT CHANGE UP (ref 3.8–5.2)
RBC # FLD: 17 % — HIGH (ref 10.3–14.5)
RH IG SCN BLD-IMP: POSITIVE — SIGNIFICANT CHANGE UP
SODIUM SERPL-SCNC: 141 MMOL/L — SIGNIFICANT CHANGE UP (ref 135–145)
SP GR SPEC: 1.03 — HIGH (ref 1.01–1.02)
UROBILINOGEN FLD QL: ABNORMAL
WBC # BLD: 6.9 K/UL — SIGNIFICANT CHANGE UP (ref 3.8–10.5)
WBC # FLD AUTO: 6.9 K/UL — SIGNIFICANT CHANGE UP (ref 3.8–10.5)

## 2019-09-11 PROCEDURE — 99285 EMERGENCY DEPT VISIT HI MDM: CPT | Mod: 25

## 2019-09-11 PROCEDURE — 76937 US GUIDE VASCULAR ACCESS: CPT | Mod: 26,59

## 2019-09-11 PROCEDURE — 73502 X-RAY EXAM HIP UNI 2-3 VIEWS: CPT | Mod: 26,RT

## 2019-09-11 PROCEDURE — 71045 X-RAY EXAM CHEST 1 VIEW: CPT | Mod: 26

## 2019-09-11 PROCEDURE — 36000 PLACE NEEDLE IN VEIN: CPT

## 2019-09-11 PROCEDURE — 73552 X-RAY EXAM OF FEMUR 2/>: CPT | Mod: 26,RT

## 2019-09-11 RX ORDER — NIFEDIPINE 30 MG
60 TABLET, EXTENDED RELEASE 24 HR ORAL DAILY
Refills: 0 | Status: DISCONTINUED | OUTPATIENT
Start: 2019-09-11 | End: 2019-09-19

## 2019-09-11 RX ORDER — LIDOCAINE 4 G/100G
1 CREAM TOPICAL ONCE
Refills: 0 | Status: COMPLETED | OUTPATIENT
Start: 2019-09-11 | End: 2019-09-11

## 2019-09-11 RX ORDER — PANTOPRAZOLE SODIUM 20 MG/1
40 TABLET, DELAYED RELEASE ORAL DAILY
Refills: 0 | Status: DISCONTINUED | OUTPATIENT
Start: 2019-09-11 | End: 2019-09-14

## 2019-09-11 RX ORDER — TIOTROPIUM BROMIDE 18 UG/1
1 CAPSULE ORAL; RESPIRATORY (INHALATION) DAILY
Refills: 0 | Status: DISCONTINUED | OUTPATIENT
Start: 2019-09-11 | End: 2019-09-19

## 2019-09-11 RX ORDER — MAGNESIUM HYDROXIDE 400 MG/1
30 TABLET, CHEWABLE ORAL DAILY
Refills: 0 | Status: DISCONTINUED | OUTPATIENT
Start: 2019-09-11 | End: 2019-09-19

## 2019-09-11 RX ORDER — OXYCODONE HYDROCHLORIDE 5 MG/1
5 TABLET ORAL EVERY 4 HOURS
Refills: 0 | Status: DISCONTINUED | OUTPATIENT
Start: 2019-09-11 | End: 2019-09-12

## 2019-09-11 RX ORDER — ALBUTEROL 90 UG/1
2 AEROSOL, METERED ORAL EVERY 6 HOURS
Refills: 0 | Status: DISCONTINUED | OUTPATIENT
Start: 2019-09-11 | End: 2019-09-19

## 2019-09-11 RX ORDER — LISINOPRIL 2.5 MG/1
10 TABLET ORAL DAILY
Refills: 0 | Status: DISCONTINUED | OUTPATIENT
Start: 2019-09-11 | End: 2019-09-19

## 2019-09-11 RX ORDER — METOCLOPRAMIDE HCL 10 MG
5 TABLET ORAL EVERY 6 HOURS
Refills: 0 | Status: DISCONTINUED | OUTPATIENT
Start: 2019-09-11 | End: 2019-09-19

## 2019-09-11 RX ORDER — OXYCODONE HYDROCHLORIDE 5 MG/1
10 TABLET ORAL EVERY 6 HOURS
Refills: 0 | Status: DISCONTINUED | OUTPATIENT
Start: 2019-09-11 | End: 2019-09-11

## 2019-09-11 RX ORDER — SODIUM CHLORIDE 9 MG/ML
1000 INJECTION INTRAMUSCULAR; INTRAVENOUS; SUBCUTANEOUS ONCE
Refills: 0 | Status: COMPLETED | OUTPATIENT
Start: 2019-09-11 | End: 2019-09-11

## 2019-09-11 RX ORDER — NICOTINE POLACRILEX 2 MG
1 GUM BUCCAL DAILY
Refills: 0 | Status: DISCONTINUED | OUTPATIENT
Start: 2019-09-11 | End: 2019-09-13

## 2019-09-11 RX ORDER — MORPHINE SULFATE 50 MG/1
1 CAPSULE, EXTENDED RELEASE ORAL
Refills: 0 | Status: DISCONTINUED | OUTPATIENT
Start: 2019-09-11 | End: 2019-09-11

## 2019-09-11 RX ORDER — DOCUSATE SODIUM 100 MG
100 CAPSULE ORAL THREE TIMES A DAY
Refills: 0 | Status: DISCONTINUED | OUTPATIENT
Start: 2019-09-11 | End: 2019-09-19

## 2019-09-11 RX ORDER — KETOROLAC TROMETHAMINE 30 MG/ML
15 SYRINGE (ML) INJECTION ONCE
Refills: 0 | Status: DISCONTINUED | OUTPATIENT
Start: 2019-09-11 | End: 2019-09-11

## 2019-09-11 RX ORDER — AMLODIPINE BESYLATE 2.5 MG/1
5 TABLET ORAL DAILY
Refills: 0 | Status: DISCONTINUED | OUTPATIENT
Start: 2019-09-11 | End: 2019-09-12

## 2019-09-11 RX ORDER — DIAZEPAM 5 MG
5 TABLET ORAL EVERY 8 HOURS
Refills: 0 | Status: DISCONTINUED | OUTPATIENT
Start: 2019-09-11 | End: 2019-09-18

## 2019-09-11 RX ORDER — OXYCODONE HYDROCHLORIDE 5 MG/1
2.5 TABLET ORAL EVERY 4 HOURS
Refills: 0 | Status: DISCONTINUED | OUTPATIENT
Start: 2019-09-11 | End: 2019-09-11

## 2019-09-11 RX ORDER — MORPHINE SULFATE 50 MG/1
1 CAPSULE, EXTENDED RELEASE ORAL
Refills: 0 | Status: DISCONTINUED | OUTPATIENT
Start: 2019-09-11 | End: 2019-09-12

## 2019-09-11 RX ORDER — LANOLIN ALCOHOL/MO/W.PET/CERES
3 CREAM (GRAM) TOPICAL AT BEDTIME
Refills: 0 | Status: DISCONTINUED | OUTPATIENT
Start: 2019-09-11 | End: 2019-09-19

## 2019-09-11 RX ORDER — HEPARIN SODIUM 5000 [USP'U]/ML
5000 INJECTION INTRAVENOUS; SUBCUTANEOUS EVERY 12 HOURS
Refills: 0 | Status: DISCONTINUED | OUTPATIENT
Start: 2019-09-11 | End: 2019-09-13

## 2019-09-11 RX ORDER — OXYCODONE HYDROCHLORIDE 5 MG/1
10 TABLET ORAL EVERY 6 HOURS
Refills: 0 | Status: DISCONTINUED | OUTPATIENT
Start: 2019-09-11 | End: 2019-09-12

## 2019-09-11 RX ORDER — METOPROLOL TARTRATE 50 MG
25 TABLET ORAL
Refills: 0 | Status: DISCONTINUED | OUTPATIENT
Start: 2019-09-11 | End: 2019-09-19

## 2019-09-11 RX ORDER — ACETAMINOPHEN 500 MG
650 TABLET ORAL ONCE
Refills: 0 | Status: COMPLETED | OUTPATIENT
Start: 2019-09-11 | End: 2019-09-11

## 2019-09-11 RX ORDER — SENNA PLUS 8.6 MG/1
2 TABLET ORAL AT BEDTIME
Refills: 0 | Status: DISCONTINUED | OUTPATIENT
Start: 2019-09-11 | End: 2019-09-19

## 2019-09-11 RX ADMIN — HEPARIN SODIUM 5000 UNIT(S): 5000 INJECTION INTRAVENOUS; SUBCUTANEOUS at 20:53

## 2019-09-11 RX ADMIN — OXYCODONE HYDROCHLORIDE 10 MILLIGRAM(S): 5 TABLET ORAL at 21:23

## 2019-09-11 RX ADMIN — MORPHINE SULFATE 1 MILLIGRAM(S): 50 CAPSULE, EXTENDED RELEASE ORAL at 23:50

## 2019-09-11 RX ADMIN — OXYCODONE HYDROCHLORIDE 5 MILLIGRAM(S): 5 TABLET ORAL at 17:33

## 2019-09-11 RX ADMIN — Medication 0.1 MILLIGRAM(S): at 20:01

## 2019-09-11 RX ADMIN — SODIUM CHLORIDE 1000 MILLILITER(S): 9 INJECTION INTRAMUSCULAR; INTRAVENOUS; SUBCUTANEOUS at 17:34

## 2019-09-11 RX ADMIN — OXYCODONE HYDROCHLORIDE 10 MILLIGRAM(S): 5 TABLET ORAL at 20:53

## 2019-09-11 RX ADMIN — MORPHINE SULFATE 1 MILLIGRAM(S): 50 CAPSULE, EXTENDED RELEASE ORAL at 23:35

## 2019-09-11 RX ADMIN — Medication 25 MILLIGRAM(S): at 18:51

## 2019-09-11 NOTE — ED PROVIDER NOTE - PROGRESS NOTE DETAILS
Attending note (Malik): angi initially not wanting to stay, stating she came into hospital and demanding food. Given food, then refusing x-ray or labs, and wanting to leave; signed ama paperwork, started to walk out, called her family and then was convinced to stay; then left, then came back.  Now agreeable for evaluation; called Ortho resident who is aware of patient, states to be admitted to [facility] for revision of the hip.  X-rays of hip obtained, appear grossly unchanged from prior; presurgical lab panel obtained and will obtain ecg/cxr for presurgical evaluation, admit to ortho service for further evaluation/management. Yousuf bishop fellow: ortho consulted, expected patient in the ED, accepts to Dr. Be. Pre-op labs/r/o sepsis/xr requested. patient ambulatory w/ cane, sitting in stretcher upright eating lunch without apparent pain. she is hemodynamically stable and ready for admit

## 2019-09-11 NOTE — ED PROVIDER NOTE - MUSCULOSKELETAL, MLM
Spine appears normal, range of motion is not limited, no muscle or joint tenderness (+) R HIP: healed surgical scar, no signs of cellulitis or swelling, no ecchymosis, (+) good distal pulses 2+

## 2019-09-11 NOTE — ED ADULT NURSE NOTE - OBJECTIVE STATEMENT
73y female arrived to ED 73y female arrived to ED for hip pain. Patient PMHx right hip replacement, bladder repair, anxiety, HTN, HLD, CAD w/ stent, emphysema, ETOH abuse. Patient reports having right hip pain, ongoing. Patient ambulates with cane. Patient BIBEMS from home. Denies CP, SOB, n/v/d, abd pain, chills, fever. VS stable. Hip does not appear to having signs of infection, surgical site intact. Patient endorses severe pain, but appears comfortable. Patient is on the phone, ROM intact, asking for food.

## 2019-09-11 NOTE — H&P ADULT - NSICDXFAMILYHX_GEN_ALL_CORE_FT
FAMILY HISTORY:  Child  Still living? No  Family history of coronary artery disease in daughter, Age at diagnosis: Age Unknown

## 2019-09-11 NOTE — ED ADULT NURSE REASSESSMENT NOTE - NS ED NURSE REASSESS COMMENT FT1
Patient decided to AMA, discussed with MD To and Malik. Patient signed paperwork. Patient changed her mind and decided to stay shortly after signing. RN went to workup patient. Patient changed her mind again and decided to leave. MD To and Malik aware. RN tried to get patient dressed. Patient decided to stay again. Patient brought to xray.

## 2019-09-11 NOTE — H&P ADULT - NSICDXPASTMEDICALHX_GEN_ALL_CORE_FT
PAST MEDICAL HISTORY:  Alcohol abuse     Anxiety     Anxiety     CAD (coronary artery disease)     Coronary artery disease     Emphysema, unspecified     HTN (hypertension)     Hyperlipidemia     Hypertension     Migraine     Migraine     Pain of right hip joint     Seizure     Stented coronary artery

## 2019-09-11 NOTE — H&P ADULT - ASSESSMENT
73F s/p R USAMA stage 1 revision (4/23/19, [facility]), preop for Stage 2 revision    - Admit to Dr. Be for stage 2 revision on 9/14  - Preop medical clearance  - Recent outpatient stress test and recent ECHO (outpatient cardiology Dr. Tera Romero [phone])  - Regular diet  - Pain control  - CBC/BMP/Coags/UA/T+S x2  - EKG/CXR  - BCx x2  - No abx needed  - Heparin for dvtp  - OOB WBAT w assist  - Plan for OR 9/14 with Dr. Be   - DW Dr. Be, agrees with above.

## 2019-09-11 NOTE — ED PROVIDER NOTE - OBJECTIVE STATEMENT
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 Reports worsening right hip pain over the past few weeks. 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 Reports worsening right hip pain over the past few weeks. Has been using tylenol # 3 for pain control w/o relief. Denies any chest pain, sob, abdominal pain, n/v, recent trauma.

## 2019-09-11 NOTE — H&P ADULT - HISTORY OF PRESENT ILLNESS
73F s/p stage 1 revision R USAMA w/ Dr. Be 4/23/19 sent to ED for admission for stage 2 revision procedure on 9/14. Reports continued R hip pain. Off iv abx. Negative aspiration cx in office. No recent trauma or falls. Saw Dr. Be in the office today. No fevers or chills. Patient denies radiation of pain. Patient denies numbness/tingling/burning in the RLE. Has been ambulatory with a 4 point cane. No other complaints at this time. Saw her outpatient cardiology Dr. Tera Romero last week. Had stress test 3 days ago. Recent echo as well.

## 2019-09-11 NOTE — ED PROVIDER NOTE - PATIENT PORTAL LINK FT
You can access the FollowMyHealth Patient Portal offered by [facility] by registering at the following website: [email or web address]. By joining [facility]’s FollowMyHealth portal, you will also be able to view your health information using other applications (apps) compatible with our system.

## 2019-09-11 NOTE — ED ADULT NURSE NOTE - PRIMARY CARE PROVIDER
unk How Severe Are Your Spot(S)?: mild Have Your Spot(S) Been Treated In The Past?: has not been treated Hpi Title: Evaluation of Skin Lesions

## 2019-09-11 NOTE — ED PROVIDER NOTE - ATTENDING CONTRIBUTION TO CARE
72 y/o F with multiple medical comorbidities as documented above, and chronic right hip pain (h/o hip replacement c/b infection now s/p removal of hardware/revision and placement of antibiotic spacer); c/o chronic right hip pain and was sent to ED for admission by her outpatient orthopedic; pain has been present since initial surgery following hip fracture; no fever/chills, no n/v; reports being able to walk on it but nothing has helped her pain.    Of note, patient at times difficult to redirect, did not initially tell ED staff that she was sent in by her orthopedist and at multiple points refused to provide information, refused to participate in evaluation (such as refusing x-rays) and demanding food at one point (prior to any evaluation) and refusing to answer many questions initially asked by this physician.  Only after d/w orthopedic surgery resident (who was notified of her arrival by private orthopedist) did furhter information become forthcoming.      On Physical Exam:  HEENT: PERRL, MMM  Neck: no neck tenderness, no nuchal rigidity  Cardiac: normal s1, s2; RRR; no MGR  Lungs: CTABL  Abdomen: soft nontender/nondistended  : no bladder tenderness or distension  Skin: intact, no rash  Extremities: no peripheral edema, no gross deformities  Neuro: no gross neurologic deficits     AP: labs including esr/crp as per request by ortho, x-rays of right hip and then admission for revision of the right hip

## 2019-09-11 NOTE — ED PROVIDER NOTE - NSFOLLOWUPINSTRUCTIONS_ED_ALL_ED_FT
Return to the emergency room if you experience worsening symptoms, fevers, nausea/vomiting, weakness, chest pain, or new concerning symptoms.    follow up with your primary care doctor in 1-2 days.

## 2019-09-11 NOTE — H&P ADULT - NSHPPHYSICALEXAM_GEN_ALL_CORE
T(C): 36.7 (09-11-19 @ 16:06), Max: 36.9 (09-11-19 @ 12:11)  HR: 78 (09-11-19 @ 16:06) (74 - 78)  BP: 148/68 (09-11-19 @ 16:06) (144/55 - 148/68)  RR: 18 (09-11-19 @ 16:06) (17 - 18)  SpO2: 98% (09-11-19 @ 16:06) (98% - 99%)  Wt(kg): --    PE   RLE:  Previous scar noted, intact without redness, fluctuance or drainage.   Mild TTP about hip.   Compartments soft; No TTP to knee/leg/ankle/foot   ROM F90, IR15, ER20   able to SLR; - Log Roll/Heel Strike  Motor intact GS/TA/FHL/EHL  SILT L2-S1  DP/PT pulses 2+    LLE/BUE:   No bony TTP; Good ROM w/o pain; Exam Unremarkable    Imaging:  XR demonstrating antibiotic space in place about R hip. No fracture or dislocation.

## 2019-09-12 DIAGNOSIS — G43.909 MIGRAINE, UNSPECIFIED, NOT INTRACTABLE, WITHOUT STATUS MIGRAINOSUS: ICD-10-CM

## 2019-09-12 DIAGNOSIS — J43.9 EMPHYSEMA, UNSPECIFIED: ICD-10-CM

## 2019-09-12 DIAGNOSIS — I25.10 ATHEROSCLEROTIC HEART DISEASE OF NATIVE CORONARY ARTERY WITHOUT ANGINA PECTORIS: ICD-10-CM

## 2019-09-12 DIAGNOSIS — I10 ESSENTIAL (PRIMARY) HYPERTENSION: ICD-10-CM

## 2019-09-12 DIAGNOSIS — M25.551 PAIN IN RIGHT HIP: ICD-10-CM

## 2019-09-12 DIAGNOSIS — F10.10 ALCOHOL ABUSE, UNCOMPLICATED: ICD-10-CM

## 2019-09-12 LAB
CULTURE RESULTS: SIGNIFICANT CHANGE UP
SPECIMEN SOURCE: SIGNIFICANT CHANGE UP

## 2019-09-12 RX ORDER — HYDROMORPHONE HYDROCHLORIDE 2 MG/ML
4 INJECTION INTRAMUSCULAR; INTRAVENOUS; SUBCUTANEOUS
Refills: 0 | Status: DISCONTINUED | OUTPATIENT
Start: 2019-09-12 | End: 2019-09-19

## 2019-09-12 RX ORDER — HYDROMORPHONE HYDROCHLORIDE 2 MG/ML
2 INJECTION INTRAMUSCULAR; INTRAVENOUS; SUBCUTANEOUS
Refills: 0 | Status: DISCONTINUED | OUTPATIENT
Start: 2019-09-12 | End: 2019-09-19

## 2019-09-12 RX ORDER — MORPHINE SULFATE 50 MG/1
2 CAPSULE, EXTENDED RELEASE ORAL
Refills: 0 | Status: DISCONTINUED | OUTPATIENT
Start: 2019-09-12 | End: 2019-09-18

## 2019-09-12 RX ORDER — MORPHINE SULFATE 50 MG/1
4 CAPSULE, EXTENDED RELEASE ORAL ONCE
Refills: 0 | Status: DISCONTINUED | OUTPATIENT
Start: 2019-09-12 | End: 2019-09-12

## 2019-09-12 RX ORDER — MORPHINE SULFATE 50 MG/1
15 CAPSULE, EXTENDED RELEASE ORAL EVERY 8 HOURS
Refills: 0 | Status: DISCONTINUED | OUTPATIENT
Start: 2019-09-12 | End: 2019-09-19

## 2019-09-12 RX ORDER — AMLODIPINE BESYLATE 2.5 MG/1
10 TABLET ORAL DAILY
Refills: 0 | Status: DISCONTINUED | OUTPATIENT
Start: 2019-09-12 | End: 2019-09-19

## 2019-09-12 RX ORDER — AMLODIPINE BESYLATE 2.5 MG/1
5 TABLET ORAL ONCE
Refills: 0 | Status: COMPLETED | OUTPATIENT
Start: 2019-09-12 | End: 2019-09-12

## 2019-09-12 RX ORDER — ACETAMINOPHEN 500 MG
1000 TABLET ORAL ONCE
Refills: 0 | Status: COMPLETED | OUTPATIENT
Start: 2019-09-12 | End: 2019-09-12

## 2019-09-12 RX ORDER — LABETALOL HCL 100 MG
10 TABLET ORAL ONCE
Refills: 0 | Status: COMPLETED | OUTPATIENT
Start: 2019-09-12 | End: 2019-09-12

## 2019-09-12 RX ADMIN — LISINOPRIL 10 MILLIGRAM(S): 2.5 TABLET ORAL at 05:16

## 2019-09-12 RX ADMIN — Medication 10 MILLIGRAM(S): at 00:55

## 2019-09-12 RX ADMIN — Medication 5 MILLIGRAM(S): at 15:49

## 2019-09-12 RX ADMIN — MORPHINE SULFATE 1 MILLIGRAM(S): 50 CAPSULE, EXTENDED RELEASE ORAL at 07:32

## 2019-09-12 RX ADMIN — AMLODIPINE BESYLATE 5 MILLIGRAM(S): 2.5 TABLET ORAL at 05:17

## 2019-09-12 RX ADMIN — MORPHINE SULFATE 4 MILLIGRAM(S): 50 CAPSULE, EXTENDED RELEASE ORAL at 02:11

## 2019-09-12 RX ADMIN — OXYCODONE HYDROCHLORIDE 10 MILLIGRAM(S): 5 TABLET ORAL at 10:25

## 2019-09-12 RX ADMIN — MORPHINE SULFATE 15 MILLIGRAM(S): 50 CAPSULE, EXTENDED RELEASE ORAL at 13:52

## 2019-09-12 RX ADMIN — MORPHINE SULFATE 2 MILLIGRAM(S): 50 CAPSULE, EXTENDED RELEASE ORAL at 14:10

## 2019-09-12 RX ADMIN — MORPHINE SULFATE 4 MILLIGRAM(S): 50 CAPSULE, EXTENDED RELEASE ORAL at 01:56

## 2019-09-12 RX ADMIN — OXYCODONE HYDROCHLORIDE 10 MILLIGRAM(S): 5 TABLET ORAL at 03:38

## 2019-09-12 RX ADMIN — MORPHINE SULFATE 2 MILLIGRAM(S): 50 CAPSULE, EXTENDED RELEASE ORAL at 17:20

## 2019-09-12 RX ADMIN — HYDROMORPHONE HYDROCHLORIDE 4 MILLIGRAM(S): 2 INJECTION INTRAMUSCULAR; INTRAVENOUS; SUBCUTANEOUS at 19:06

## 2019-09-12 RX ADMIN — PANTOPRAZOLE SODIUM 40 MILLIGRAM(S): 20 TABLET, DELAYED RELEASE ORAL at 11:14

## 2019-09-12 RX ADMIN — Medication 60 MILLIGRAM(S): at 05:16

## 2019-09-12 RX ADMIN — MORPHINE SULFATE 15 MILLIGRAM(S): 50 CAPSULE, EXTENDED RELEASE ORAL at 14:10

## 2019-09-12 RX ADMIN — HYDROMORPHONE HYDROCHLORIDE 4 MILLIGRAM(S): 2 INJECTION INTRAMUSCULAR; INTRAVENOUS; SUBCUTANEOUS at 16:10

## 2019-09-12 RX ADMIN — HEPARIN SODIUM 5000 UNIT(S): 5000 INJECTION INTRAVENOUS; SUBCUTANEOUS at 17:05

## 2019-09-12 RX ADMIN — AMLODIPINE BESYLATE 5 MILLIGRAM(S): 2.5 TABLET ORAL at 01:56

## 2019-09-12 RX ADMIN — ALBUTEROL 2 PUFF(S): 90 AEROSOL, METERED ORAL at 05:17

## 2019-09-12 RX ADMIN — HYDROMORPHONE HYDROCHLORIDE 4 MILLIGRAM(S): 2 INJECTION INTRAMUSCULAR; INTRAVENOUS; SUBCUTANEOUS at 19:36

## 2019-09-12 RX ADMIN — MORPHINE SULFATE 1 MILLIGRAM(S): 50 CAPSULE, EXTENDED RELEASE ORAL at 05:35

## 2019-09-12 RX ADMIN — HEPARIN SODIUM 5000 UNIT(S): 5000 INJECTION INTRAVENOUS; SUBCUTANEOUS at 05:18

## 2019-09-12 RX ADMIN — MORPHINE SULFATE 1 MILLIGRAM(S): 50 CAPSULE, EXTENDED RELEASE ORAL at 05:20

## 2019-09-12 RX ADMIN — Medication 25 MILLIGRAM(S): at 17:05

## 2019-09-12 RX ADMIN — ALBUTEROL 2 PUFF(S): 90 AEROSOL, METERED ORAL at 17:09

## 2019-09-12 RX ADMIN — Medication 0.1 MILLIGRAM(S): at 03:09

## 2019-09-12 RX ADMIN — Medication 1 PATCH: at 13:56

## 2019-09-12 RX ADMIN — Medication 0.1 MILLIGRAM(S): at 17:05

## 2019-09-12 RX ADMIN — MORPHINE SULFATE 2 MILLIGRAM(S): 50 CAPSULE, EXTENDED RELEASE ORAL at 13:53

## 2019-09-12 RX ADMIN — MORPHINE SULFATE 1 MILLIGRAM(S): 50 CAPSULE, EXTENDED RELEASE ORAL at 07:47

## 2019-09-12 RX ADMIN — OXYCODONE HYDROCHLORIDE 10 MILLIGRAM(S): 5 TABLET ORAL at 10:40

## 2019-09-12 RX ADMIN — Medication 25 MILLIGRAM(S): at 05:16

## 2019-09-12 RX ADMIN — MORPHINE SULFATE 2 MILLIGRAM(S): 50 CAPSULE, EXTENDED RELEASE ORAL at 21:37

## 2019-09-12 RX ADMIN — Medication 5 MILLIGRAM(S): at 07:32

## 2019-09-12 RX ADMIN — MORPHINE SULFATE 2 MILLIGRAM(S): 50 CAPSULE, EXTENDED RELEASE ORAL at 17:05

## 2019-09-12 RX ADMIN — Medication 1000 MILLIGRAM(S): at 16:17

## 2019-09-12 RX ADMIN — HYDROMORPHONE HYDROCHLORIDE 2 MILLIGRAM(S): 2 INJECTION INTRAMUSCULAR; INTRAVENOUS; SUBCUTANEOUS at 12:37

## 2019-09-12 RX ADMIN — MORPHINE SULFATE 15 MILLIGRAM(S): 50 CAPSULE, EXTENDED RELEASE ORAL at 21:50

## 2019-09-12 RX ADMIN — HYDROMORPHONE HYDROCHLORIDE 4 MILLIGRAM(S): 2 INJECTION INTRAMUSCULAR; INTRAVENOUS; SUBCUTANEOUS at 15:47

## 2019-09-12 RX ADMIN — MORPHINE SULFATE 2 MILLIGRAM(S): 50 CAPSULE, EXTENDED RELEASE ORAL at 21:50

## 2019-09-12 RX ADMIN — HYDROMORPHONE HYDROCHLORIDE 2 MILLIGRAM(S): 2 INJECTION INTRAMUSCULAR; INTRAVENOUS; SUBCUTANEOUS at 12:07

## 2019-09-12 RX ADMIN — MORPHINE SULFATE 15 MILLIGRAM(S): 50 CAPSULE, EXTENDED RELEASE ORAL at 21:36

## 2019-09-12 RX ADMIN — OXYCODONE HYDROCHLORIDE 10 MILLIGRAM(S): 5 TABLET ORAL at 03:08

## 2019-09-12 RX ADMIN — Medication 400 MILLIGRAM(S): at 15:47

## 2019-09-12 NOTE — PROGRESS NOTE ADULT - ASSESSMENT
Impression: Stable       Plan:   Continue present treatment                 Preop for surgery 9/14                 Out of bed, ambulate, weight bearing as tolerated                 Continue to monitor    Ferny Mahoney PA-C  Orthopaedic Surgery  Team pager [phone]  [phone]

## 2019-09-12 NOTE — PROVIDER CONTACT NOTE (OTHER) - ACTION/TREATMENT ORDERED:
Ortho made aware several times overnight.Various antihypertensives & pain meds given throughout the night(see flowsheet &eMAR). Antihypertensives also given this morning with AM meds. Will monitor.

## 2019-09-12 NOTE — PROGRESS NOTE ADULT - SUBJECTIVE AND OBJECTIVE BOX
73F s/p stage 1 revision R USAMA w/ Dr. Be 19 sent to ED for admission for stage 2 revision procedure on . Reports continued R hip pain. Off iv abx. Negative aspiration cx in office. No recent trauma or falls. Saw Dr. Be in the office. No fevers or chills. Patient denies radiation of pain. Patient denies numbness/tingling/burning in the RLE. Has been ambulatory with a 4 point cane. No other complaints at this time.  Patient had a normal echo and stress test in the  past week. scheduled for revision     MEDICATIONS  (STANDING):  ALBUTerol    90 MICROgram(s) HFA Inhaler 2 Puff(s) Inhalation every 6 hours  amLODIPine   Tablet 5 milliGRAM(s) Oral daily  cloNIDine 0.1 milliGRAM(s) Oral two times a day  docusate sodium 100 milliGRAM(s) Oral three times a day  heparin  Injectable 5000 Unit(s) SubCutaneous every 12 hours  lisinopril 10 milliGRAM(s) Oral daily  metoprolol tartrate 25 milliGRAM(s) Oral two times a day  morphine ER Tablet 15 milliGRAM(s) Oral every 8 hours  nicotine -  14 mG/24Hr(s) Patch 1 patch Transdermal daily  NIFEdipine XL 60 milliGRAM(s) Oral daily  pantoprazole  Injectable 40 milliGRAM(s) IV Push daily  senna 2 Tablet(s) Oral at bedtime  tiotropium 18 MICROgram(s) Capsule 1 Capsule(s) Inhalation daily    MEDICATIONS  (PRN):  diazepam    Tablet 5 milliGRAM(s) Oral every 8 hours PRN anxiety  HYDROmorphone   Tablet 2 milliGRAM(s) Oral every 3 hours PRN Moderate Pain (4 - 6)  HYDROmorphone   Tablet 4 milliGRAM(s) Oral every 3 hours PRN Severe Pain (7 - 10)  magnesium hydroxide Suspension 30 milliLiter(s) Oral daily PRN Constipation  melatonin 3 milliGRAM(s) Oral at bedtime PRN Insomnia  metoclopramide Injectable 5 milliGRAM(s) IV Push every 6 hours PRN Nausea and/or Vomiting  morphine  - Injectable 2 milliGRAM(s) IV Push every 2 hours PRN Severe Pain (7 - 10)          VITALS:   T(C): 36.3 (19 @ 15:32), Max: 37 (19 @ 22:22)  HR: 62 (19 @ 15:32) (57 - 77)  BP: 168/77 (19 @ 15:32) (160/73 - 206/72)  RR: 16 (19 @ 15:32) (16 - 16)  SpO2: 98% (19 @ 15:32) (95% - 99%)  Wt(kg): --    PHYSICAL EXAM:  GENERAL: NAD, well nourished and conversant  HEAD:  Atraumatic  EYES: EOM, PERRLA, conjunctiva pink and sclera white  ENT: No tonsillar erythema, exudates, or enlargement, moist mucous membranes, good dentition, no lesions  NECK: Supple, No JVD, normal thyroid, carotids with normal upstrokes and no bruits  CHEST/LUNG: Clear to auscultation bilaterally, No rales, rhonchi, wheezing, or rubs  HEART: Regular rate and rhythm, No murmurs, rubs, or gallops  ABDOMEN: Soft, nondistended, no masses, guarding, tenderness or rebound, bowel sounds present  EXTREMITIES:  2+ Peripheral Pulses, No clubbing, cyanosis, or edema.  Right hia ains  LYMPH: No lymphadenopathy noted  SKIN: No rashes or lesions  NERVOUS SYSTEM:  Alert & Oriented X3, normal cognitive function. Motor Strength 5/5     LABS:        CBC Full  -  ( 11 Sep 2019 15:59 )  WBC Count : 6.9 K/uL  RBC Count : 4.49 M/uL  Hemoglobin : 13.6 g/dL  Hematocrit : 41.8 %  Platelet Count - Automated : 268 K/uL  Mean Cell Volume : 93.1 fl  Mean Cell Hemoglobin : 30.2 pg  Mean Cell Hemoglobin Concentration : 32.5 gm/dL  Auto Neutrophil # : 5.4 K/uL  Auto Lymphocyte # : 1.0 K/uL  Auto Monocyte # : 0.4 K/uL  Auto Eosinophil # : 0.1 K/uL  Auto Basophil # : 0.0 K/uL  Auto Neutrophil % : 77.5 %  Auto Lymphocyte % : 15.1 %  Auto Monocyte % : 6.1 %  Auto Eosinophil % : 1.0 %  Auto Basophil % : 0.3 %        141  |  102  |  26<H>  ----------------------------<  96  4.1   |  25  |  0.97    Ca    9.0      11 Sep 2019 15:59    TPro  7.8  /  Alb  4.2  /  TBili  0.1<L>  /  DBili  x   /  AST  16  /  ALT  12  /  AlkPhos  138<H>      LIVER FUNCTIONS - ( 11 Sep 2019 15:59 )  Alb: 4.2 g/dL / Pro: 7.8 g/dL / ALK PHOS: 138 U/L / ALT: 12 U/L / AST: 16 U/L / GGT: x           PT/INR - ( 11 Sep 2019 15:59 )   PT: 11.0 sec;   INR: 0.97 ratio         PTT - ( 11 Sep 2019 15:59 )  PTT:25.8 sec  Urinalysis Basic - ( 11 Sep 2019 16:14 )    Color: Yellow / Appearance: Clear / S.034 / pH: x  Gluc: x / Ketone: Negative  / Bili: Negative / Urobili: 2 mg/dL   Blood: x / Protein: 100 mg/dL / Nitrite: Negative   Leuk Esterase: Small / RBC: 2 /hpf / WBC 15 /HPF   Sq Epi: x / Non Sq Epi: 5 / Bacteria: Negative      CAPILLARY BLOOD GLUCOSE          RADIOLOGY & ADDITIONAL TESTS:

## 2019-09-12 NOTE — PROGRESS NOTE ADULT - ASSESSMENT
73F s/p stage 1 revision R USAMA w/ Dr. Be 4/23/19 sent to ED for admission for stage 2 revision procedure on 9/14. Reports continued R hip pain. Off iv abx. Negative aspiration cx in office. No recent trauma or falls. Saw Dr. Be in the office today. No fevers or chills. Patient denies radiation of pain. Patient denies numbness/tingling/burning in the RLE. Has been ambulatory with a 4 point cane. No other complaints at this time.  Patient had a normal echo and stress test in the  past week

## 2019-09-13 ENCOUNTER — TRANSCRIPTION ENCOUNTER (OUTPATIENT)
Age: 74
End: 2019-09-13

## 2019-09-13 RX ORDER — ACETAMINOPHEN 500 MG
1000 TABLET ORAL ONCE
Refills: 0 | Status: COMPLETED | OUTPATIENT
Start: 2019-09-13 | End: 2019-09-13

## 2019-09-13 RX ORDER — HEPARIN SODIUM 5000 [USP'U]/ML
5000 INJECTION INTRAVENOUS; SUBCUTANEOUS EVERY 12 HOURS
Refills: 0 | Status: DISCONTINUED | OUTPATIENT
Start: 2019-09-13 | End: 2019-09-13

## 2019-09-13 RX ORDER — NICOTINE POLACRILEX 2 MG
1 GUM BUCCAL DAILY
Refills: 0 | Status: DISCONTINUED | OUTPATIENT
Start: 2019-09-13 | End: 2019-09-19

## 2019-09-13 RX ORDER — SODIUM CHLORIDE 9 MG/ML
1000 INJECTION INTRAMUSCULAR; INTRAVENOUS; SUBCUTANEOUS
Refills: 0 | Status: DISCONTINUED | OUTPATIENT
Start: 2019-09-13 | End: 2019-09-14

## 2019-09-13 RX ADMIN — MORPHINE SULFATE 2 MILLIGRAM(S): 50 CAPSULE, EXTENDED RELEASE ORAL at 15:22

## 2019-09-13 RX ADMIN — Medication 5 MILLIGRAM(S): at 18:14

## 2019-09-13 RX ADMIN — AMLODIPINE BESYLATE 10 MILLIGRAM(S): 2.5 TABLET ORAL at 06:30

## 2019-09-13 RX ADMIN — ALBUTEROL 2 PUFF(S): 90 AEROSOL, METERED ORAL at 06:17

## 2019-09-13 RX ADMIN — ALBUTEROL 2 PUFF(S): 90 AEROSOL, METERED ORAL at 00:35

## 2019-09-13 RX ADMIN — HYDROMORPHONE HYDROCHLORIDE 4 MILLIGRAM(S): 2 INJECTION INTRAMUSCULAR; INTRAVENOUS; SUBCUTANEOUS at 06:17

## 2019-09-13 RX ADMIN — Medication 5 MILLIGRAM(S): at 00:30

## 2019-09-13 RX ADMIN — TIOTROPIUM BROMIDE 1 CAPSULE(S): 18 CAPSULE ORAL; RESPIRATORY (INHALATION) at 12:40

## 2019-09-13 RX ADMIN — MORPHINE SULFATE 2 MILLIGRAM(S): 50 CAPSULE, EXTENDED RELEASE ORAL at 23:27

## 2019-09-13 RX ADMIN — HYDROMORPHONE HYDROCHLORIDE 4 MILLIGRAM(S): 2 INJECTION INTRAMUSCULAR; INTRAVENOUS; SUBCUTANEOUS at 00:30

## 2019-09-13 RX ADMIN — ALBUTEROL 2 PUFF(S): 90 AEROSOL, METERED ORAL at 11:16

## 2019-09-13 RX ADMIN — Medication 25 MILLIGRAM(S): at 06:16

## 2019-09-13 RX ADMIN — MORPHINE SULFATE 2 MILLIGRAM(S): 50 CAPSULE, EXTENDED RELEASE ORAL at 16:00

## 2019-09-13 RX ADMIN — HYDROMORPHONE HYDROCHLORIDE 4 MILLIGRAM(S): 2 INJECTION INTRAMUSCULAR; INTRAVENOUS; SUBCUTANEOUS at 22:18

## 2019-09-13 RX ADMIN — MORPHINE SULFATE 2 MILLIGRAM(S): 50 CAPSULE, EXTENDED RELEASE ORAL at 20:18

## 2019-09-13 RX ADMIN — Medication 0.1 MILLIGRAM(S): at 18:15

## 2019-09-13 RX ADMIN — Medication 1 PATCH: at 12:39

## 2019-09-13 RX ADMIN — MORPHINE SULFATE 2 MILLIGRAM(S): 50 CAPSULE, EXTENDED RELEASE ORAL at 03:00

## 2019-09-13 RX ADMIN — Medication 25 MILLIGRAM(S): at 18:15

## 2019-09-13 RX ADMIN — LISINOPRIL 10 MILLIGRAM(S): 2.5 TABLET ORAL at 06:15

## 2019-09-13 RX ADMIN — HYDROMORPHONE HYDROCHLORIDE 4 MILLIGRAM(S): 2 INJECTION INTRAMUSCULAR; INTRAVENOUS; SUBCUTANEOUS at 01:00

## 2019-09-13 RX ADMIN — MORPHINE SULFATE 15 MILLIGRAM(S): 50 CAPSULE, EXTENDED RELEASE ORAL at 06:47

## 2019-09-13 RX ADMIN — Medication 60 MILLIGRAM(S): at 06:15

## 2019-09-13 RX ADMIN — Medication 1 PATCH: at 19:11

## 2019-09-13 RX ADMIN — Medication 100 MILLIGRAM(S): at 06:16

## 2019-09-13 RX ADMIN — MORPHINE SULFATE 15 MILLIGRAM(S): 50 CAPSULE, EXTENDED RELEASE ORAL at 06:17

## 2019-09-13 RX ADMIN — HEPARIN SODIUM 5000 UNIT(S): 5000 INJECTION INTRAVENOUS; SUBCUTANEOUS at 06:16

## 2019-09-13 RX ADMIN — MORPHINE SULFATE 2 MILLIGRAM(S): 50 CAPSULE, EXTENDED RELEASE ORAL at 08:15

## 2019-09-13 RX ADMIN — HYDROMORPHONE HYDROCHLORIDE 4 MILLIGRAM(S): 2 INJECTION INTRAMUSCULAR; INTRAVENOUS; SUBCUTANEOUS at 12:47

## 2019-09-13 RX ADMIN — MORPHINE SULFATE 15 MILLIGRAM(S): 50 CAPSULE, EXTENDED RELEASE ORAL at 13:51

## 2019-09-13 RX ADMIN — Medication 0.1 MILLIGRAM(S): at 06:16

## 2019-09-13 RX ADMIN — HYDROMORPHONE HYDROCHLORIDE 4 MILLIGRAM(S): 2 INJECTION INTRAMUSCULAR; INTRAVENOUS; SUBCUTANEOUS at 06:47

## 2019-09-13 RX ADMIN — HYDROMORPHONE HYDROCHLORIDE 4 MILLIGRAM(S): 2 INJECTION INTRAMUSCULAR; INTRAVENOUS; SUBCUTANEOUS at 21:48

## 2019-09-13 RX ADMIN — HEPARIN SODIUM 5000 UNIT(S): 5000 INJECTION INTRAVENOUS; SUBCUTANEOUS at 18:15

## 2019-09-13 RX ADMIN — MORPHINE SULFATE 2 MILLIGRAM(S): 50 CAPSULE, EXTENDED RELEASE ORAL at 03:15

## 2019-09-13 RX ADMIN — MORPHINE SULFATE 15 MILLIGRAM(S): 50 CAPSULE, EXTENDED RELEASE ORAL at 14:21

## 2019-09-13 RX ADMIN — HYDROMORPHONE HYDROCHLORIDE 4 MILLIGRAM(S): 2 INJECTION INTRAMUSCULAR; INTRAVENOUS; SUBCUTANEOUS at 15:21

## 2019-09-13 RX ADMIN — MORPHINE SULFATE 2 MILLIGRAM(S): 50 CAPSULE, EXTENDED RELEASE ORAL at 23:42

## 2019-09-13 RX ADMIN — MORPHINE SULFATE 2 MILLIGRAM(S): 50 CAPSULE, EXTENDED RELEASE ORAL at 20:03

## 2019-09-13 RX ADMIN — MORPHINE SULFATE 15 MILLIGRAM(S): 50 CAPSULE, EXTENDED RELEASE ORAL at 21:48

## 2019-09-13 RX ADMIN — ALBUTEROL 2 PUFF(S): 90 AEROSOL, METERED ORAL at 18:15

## 2019-09-13 RX ADMIN — MORPHINE SULFATE 2 MILLIGRAM(S): 50 CAPSULE, EXTENDED RELEASE ORAL at 12:44

## 2019-09-13 NOTE — PROGRESS NOTE ADULT - SUBJECTIVE AND OBJECTIVE BOX
73F s/p stage 1 revision R USAMA w/ Dr. Be 19 sent to ED for admission for stage 2 revision procedure on . Reports continued R hip pain. Off iv abx. Negative aspiration cx in office. No recent trauma or falls. Saw Dr. Be in the office. No fevers or chills. Patient denies radiation of pain. Patient denies numbness/tingling/burning in the RLE. Has been ambulatory with a 4 point cane. No other complaints at this time.  Patient had a normal echo and stress test in the  past week. scheduled for revision     MEDICATIONS  (STANDING):  ALBUTerol    90 MICROgram(s) HFA Inhaler 2 Puff(s) Inhalation every 6 hours  amLODIPine   Tablet 5 milliGRAM(s) Oral daily  cloNIDine 0.1 milliGRAM(s) Oral two times a day  docusate sodium 100 milliGRAM(s) Oral three times a day  heparin  Injectable 5000 Unit(s) SubCutaneous every 12 hours  lisinopril 10 milliGRAM(s) Oral daily  metoprolol tartrate 25 milliGRAM(s) Oral two times a day  morphine ER Tablet 15 milliGRAM(s) Oral every 8 hours  nicotine -  14 mG/24Hr(s) Patch 1 patch Transdermal daily  NIFEdipine XL 60 milliGRAM(s) Oral daily  pantoprazole  Injectable 40 milliGRAM(s) IV Push daily  senna 2 Tablet(s) Oral at bedtime  tiotropium 18 MICROgram(s) Capsule 1 Capsule(s) Inhalation daily    MEDICATIONS  (PRN):  diazepam    Tablet 5 milliGRAM(s) Oral every 8 hours PRN anxiety  HYDROmorphone   Tablet 2 milliGRAM(s) Oral every 3 hours PRN Moderate Pain (4 - 6)  HYDROmorphone   Tablet 4 milliGRAM(s) Oral every 3 hours PRN Severe Pain (7 - 10)  magnesium hydroxide Suspension 30 milliLiter(s) Oral daily PRN Constipation  melatonin 3 milliGRAM(s) Oral at bedtime PRN Insomnia  metoclopramide Injectable 5 milliGRAM(s) IV Push every 6 hours PRN Nausea and/or Vomiting  morphine  - Injectable 2 milliGRAM(s) IV Push every 2 hours PRN Severe Pain (7 - 10)          VITALS:   Vital Signs Last 24 Hrs  T(C): 36.9 (13 Sep 2019 16:50), Max: 36.9 (13 Sep 2019 16:50)  T(F): 98.4 (13 Sep 2019 16:50), Max: 98.4 (13 Sep 2019 16:50)  HR: 57 (13 Sep 2019 16:50) (54 - 62)  BP: 161/75 (13 Sep 2019 16:50) (108/58 - 183/69)  BP(mean): --  RR: 16 (13 Sep 2019 16:50) (16 - 16)  SpO2: 95% (13 Sep 2019 16:50) (93% - 95%)    PHYSICAL EXAM:  GENERAL: NAD, well nourished and conversant  HEAD:  Atraumatic  EYES: EOM, PERRLA, conjunctiva pink and sclera white  ENT: No tonsillar erythema, exudates, or enlargement, moist mucous membranes, good dentition, no lesions  NECK: Supple, No JVD, normal thyroid, carotids with normal upstrokes and no bruits  CHEST/LUNG: Clear to auscultation bilaterally, No rales, rhonchi, wheezing, or rubs  HEART: Regular rate and rhythm, No murmurs, rubs, or gallops  ABDOMEN: Soft, nondistended, no masses, guarding, tenderness or rebound, bowel sounds present  EXTREMITIES:  2+ Peripheral Pulses, No clubbing, cyanosis, or edema.  Right hip pain and immobility for or in AM  LYMPH: No lymphadenopathy noted  SKIN: No rashes or lesions  NERVOUS SYSTEM:  Alert & Oriented X3, normal cognitive function. Motor Strength 5/5     LABS:        CBC Full  -  ( 11 Sep 2019 15:59 )  WBC Count : 6.9 K/uL  RBC Count : 4.49 M/uL  Hemoglobin : 13.6 g/dL  Hematocrit : 41.8 %  Platelet Count - Automated : 268 K/uL  Mean Cell Volume : 93.1 fl  Mean Cell Hemoglobin : 30.2 pg  Mean Cell Hemoglobin Concentration : 32.5 gm/dL  Auto Neutrophil # : 5.4 K/uL  Auto Lymphocyte # : 1.0 K/uL  Auto Monocyte # : 0.4 K/uL  Auto Eosinophil # : 0.1 K/uL  Auto Basophil # : 0.0 K/uL  Auto Neutrophil % : 77.5 %  Auto Lymphocyte % : 15.1 %  Auto Monocyte % : 6.1 %  Auto Eosinophil % : 1.0 %  Auto Basophil % : 0.3 %        141  |  102  |  26<H>  ----------------------------<  96  4.1   |  25  |  0.97    Ca    9.0      11 Sep 2019 15:59    TPro  7.8  /  Alb  4.2  /  TBili  0.1<L>  /  DBili  x   /  AST  16  /  ALT  12  /  AlkPhos  138<H>      LIVER FUNCTIONS - ( 11 Sep 2019 15:59 )  Alb: 4.2 g/dL / Pro: 7.8 g/dL / ALK PHOS: 138 U/L / ALT: 12 U/L / AST: 16 U/L / GGT: x           PT/INR - ( 11 Sep 2019 15:59 )   PT: 11.0 sec;   INR: 0.97 ratio         PTT - ( 11 Sep 2019 15:59 )  PTT:25.8 sec  Urinalysis Basic - ( 11 Sep 2019 16:14 )    Color: Yellow / Appearance: Clear / S.034 / pH: x  Gluc: x / Ketone: Negative  / Bili: Negative / Urobili: 2 mg/dL   Blood: x / Protein: 100 mg/dL / Nitrite: Negative   Leuk Esterase: Small / RBC: 2 /hpf / WBC 15 /HPF   Sq Epi: x / Non Sq Epi: 5 / Bacteria: Negative      CAPILLARY BLOOD GLUCOSE          RADIOLOGY & ADDITIONAL TESTS:

## 2019-09-13 NOTE — PROGRESS NOTE ADULT - ASSESSMENT
73F s/p stage 1 revision R USAMA w/ Dr. Be 4/23/19 sent to ED for admission for stage 2 revision procedure on 9/14. Reports continued R hip pain. Off iv abx. Negative aspiration cx in office. No recent trauma or falls. Saw Dr. Be in the office today. No fevers or chills. Patient denies radiation of pain. Patient denies numbness/tingling/burning in the RLE. Has been ambulatory with a 4 point cane. No other complaints at this time.  Patient had a normal echo and stress test in the  past week. Medically stable to proceed with surgery as planned

## 2019-09-13 NOTE — PROGRESS NOTE ADULT - ASSESSMENT
72 y/o FM to  undergo stage 11 revision right hip 9/14/19, f/u antihypertensive meds  Kathya John PA-C  Orthopaedic Surgery  Team pager [phone]  [facility] [phone]  [phone]

## 2019-09-13 NOTE — PROGRESS NOTE ADULT - SUBJECTIVE AND OBJECTIVE BOX
Patient is a 73y old  Female who presents with a chief complaint of right hip pain, s/p stage 1 revision right hip  Patient to undergo stage 11 revision right hip 9/14/19  Patient comfortable  No complaints    T(C): 36.6 (09-13-19 @ 06:08), Max: 36.7 (09-12-19 @ 08:56)  HR: 62 (09-13-19 @ 06:08) (57 - 62)  BP: 183/69 (09-13-19 @ 06:08) (160/73 - 183/69)  RR: 16 (09-13-19 @ 06:08) (16 - 16)  SpO2: 95% (09-13-19 @ 06:08) (94% - 99%)    PHYSICAL EXAM:  NAD, Alert  [Right ] Hip:sensation grossly intact to light touch; (+) DF/PF; (+) Distal Pulses; No Calf tenderness B/L, PAS     LABS:                      13.6   6.9   )-----------( 268      ( 11 Sep 2019 15:59 )             41.8   09-11  141  |  102  |  26<H>  ----------------------------<  96  4.1   |  25  |  0.97  Ca    9.0      11 Sep 2019 15:59  TPro  7.8  /  Alb  4.2  /  TBili  0.1<L>  /  DBili  x   /  AST  16  /  ALT  12  /  AlkPhos  138<H>  09-11  PT/INR - ( 11 Sep 2019 15:59 )   PT: 11.0 sec;   INR: 0.97 ratio    PTT - ( 11 Sep 2019 15:59 )  PTT:25.8 sec

## 2019-09-14 DIAGNOSIS — Z96.649 PRESENCE OF UNSPECIFIED ARTIFICIAL HIP JOINT: ICD-10-CM

## 2019-09-14 LAB
ANION GAP SERPL CALC-SCNC: 10 MMOL/L — SIGNIFICANT CHANGE UP (ref 5–17)
ANION GAP SERPL CALC-SCNC: 15 MMOL/L — SIGNIFICANT CHANGE UP (ref 5–17)
APTT BLD: 22.5 SEC — LOW (ref 27.5–36.3)
BLD GP AB SCN SERPL QL: NEGATIVE — SIGNIFICANT CHANGE UP
BUN SERPL-MCNC: 26 MG/DL — HIGH (ref 7–23)
BUN SERPL-MCNC: 26 MG/DL — HIGH (ref 7–23)
CALCIUM SERPL-MCNC: 8.1 MG/DL — LOW (ref 8.4–10.5)
CALCIUM SERPL-MCNC: 9.2 MG/DL — SIGNIFICANT CHANGE UP (ref 8.4–10.5)
CHLORIDE SERPL-SCNC: 101 MMOL/L — SIGNIFICANT CHANGE UP (ref 96–108)
CHLORIDE SERPL-SCNC: 103 MMOL/L — SIGNIFICANT CHANGE UP (ref 96–108)
CO2 SERPL-SCNC: 21 MMOL/L — LOW (ref 22–31)
CO2 SERPL-SCNC: 25 MMOL/L — SIGNIFICANT CHANGE UP (ref 22–31)
CREAT SERPL-MCNC: 0.68 MG/DL — SIGNIFICANT CHANGE UP (ref 0.5–1.3)
CREAT SERPL-MCNC: 0.74 MG/DL — SIGNIFICANT CHANGE UP (ref 0.5–1.3)
GLUCOSE SERPL-MCNC: 103 MG/DL — HIGH (ref 70–99)
GLUCOSE SERPL-MCNC: 110 MG/DL — HIGH (ref 70–99)
HCT VFR BLD CALC: 36.5 % — SIGNIFICANT CHANGE UP (ref 34.5–45)
HCT VFR BLD CALC: 37.5 % — SIGNIFICANT CHANGE UP (ref 34.5–45)
HGB BLD-MCNC: 11.8 G/DL — SIGNIFICANT CHANGE UP (ref 11.5–15.5)
HGB BLD-MCNC: 11.9 G/DL — SIGNIFICANT CHANGE UP (ref 11.5–15.5)
INR BLD: 0.99 RATIO — SIGNIFICANT CHANGE UP (ref 0.88–1.16)
MCHC RBC-ENTMCNC: 29.5 PG — SIGNIFICANT CHANGE UP (ref 27–34)
MCHC RBC-ENTMCNC: 30.5 PG — SIGNIFICANT CHANGE UP (ref 27–34)
MCHC RBC-ENTMCNC: 31.5 GM/DL — LOW (ref 32–36)
MCHC RBC-ENTMCNC: 32.6 GM/DL — SIGNIFICANT CHANGE UP (ref 32–36)
MCV RBC AUTO: 93.6 FL — SIGNIFICANT CHANGE UP (ref 80–100)
MCV RBC AUTO: 93.6 FL — SIGNIFICANT CHANGE UP (ref 80–100)
PLATELET # BLD AUTO: 222 K/UL — SIGNIFICANT CHANGE UP (ref 150–400)
PLATELET # BLD AUTO: 226 K/UL — SIGNIFICANT CHANGE UP (ref 150–400)
POTASSIUM SERPL-MCNC: 4.6 MMOL/L — SIGNIFICANT CHANGE UP (ref 3.5–5.3)
POTASSIUM SERPL-MCNC: 5.1 MMOL/L — SIGNIFICANT CHANGE UP (ref 3.5–5.3)
POTASSIUM SERPL-SCNC: 4.6 MMOL/L — SIGNIFICANT CHANGE UP (ref 3.5–5.3)
POTASSIUM SERPL-SCNC: 5.1 MMOL/L — SIGNIFICANT CHANGE UP (ref 3.5–5.3)
PROTHROM AB SERPL-ACNC: 11.3 SEC — SIGNIFICANT CHANGE UP (ref 10–12.9)
RBC # BLD: 3.9 M/UL — SIGNIFICANT CHANGE UP (ref 3.8–5.2)
RBC # BLD: 4 M/UL — SIGNIFICANT CHANGE UP (ref 3.8–5.2)
RBC # FLD: 17 % — HIGH (ref 10.3–14.5)
RBC # FLD: 17 % — HIGH (ref 10.3–14.5)
RH IG SCN BLD-IMP: POSITIVE — SIGNIFICANT CHANGE UP
SODIUM SERPL-SCNC: 137 MMOL/L — SIGNIFICANT CHANGE UP (ref 135–145)
SODIUM SERPL-SCNC: 138 MMOL/L — SIGNIFICANT CHANGE UP (ref 135–145)
WBC # BLD: 5.3 K/UL — SIGNIFICANT CHANGE UP (ref 3.8–10.5)
WBC # BLD: 9.3 K/UL — SIGNIFICANT CHANGE UP (ref 3.8–10.5)
WBC # FLD AUTO: 5.3 K/UL — SIGNIFICANT CHANGE UP (ref 3.8–10.5)
WBC # FLD AUTO: 9.3 K/UL — SIGNIFICANT CHANGE UP (ref 3.8–10.5)

## 2019-09-14 PROCEDURE — 72170 X-RAY EXAM OF PELVIS: CPT | Mod: 26,59

## 2019-09-14 PROCEDURE — 73501 X-RAY EXAM HIP UNI 1 VIEW: CPT | Mod: 26,RT

## 2019-09-14 PROCEDURE — 27134 REVISE HIP JOINT REPLACEMENT: CPT | Mod: RT

## 2019-09-14 RX ORDER — ONDANSETRON 8 MG/1
4 TABLET, FILM COATED ORAL ONCE
Refills: 0 | Status: DISCONTINUED | OUTPATIENT
Start: 2019-09-14 | End: 2019-09-14

## 2019-09-14 RX ORDER — KETOROLAC TROMETHAMINE 30 MG/ML
15 SYRINGE (ML) INJECTION EVERY 8 HOURS
Refills: 0 | Status: DISCONTINUED | OUTPATIENT
Start: 2019-09-14 | End: 2019-09-16

## 2019-09-14 RX ORDER — CEFAZOLIN SODIUM 1 G
1000 VIAL (EA) INJECTION EVERY 8 HOURS
Refills: 0 | Status: COMPLETED | OUTPATIENT
Start: 2019-09-14 | End: 2019-09-15

## 2019-09-14 RX ORDER — PANTOPRAZOLE SODIUM 20 MG/1
40 TABLET, DELAYED RELEASE ORAL
Refills: 0 | Status: DISCONTINUED | OUTPATIENT
Start: 2019-09-14 | End: 2019-09-19

## 2019-09-14 RX ORDER — ASPIRIN/CALCIUM CARB/MAGNESIUM 324 MG
325 TABLET ORAL EVERY 12 HOURS
Refills: 0 | Status: DISCONTINUED | OUTPATIENT
Start: 2019-09-14 | End: 2019-09-19

## 2019-09-14 RX ORDER — HYDROMORPHONE HYDROCHLORIDE 2 MG/ML
0.25 INJECTION INTRAMUSCULAR; INTRAVENOUS; SUBCUTANEOUS
Refills: 0 | Status: DISCONTINUED | OUTPATIENT
Start: 2019-09-14 | End: 2019-09-14

## 2019-09-14 RX ORDER — SODIUM CHLORIDE 9 MG/ML
500 INJECTION INTRAMUSCULAR; INTRAVENOUS; SUBCUTANEOUS ONCE
Refills: 0 | Status: COMPLETED | OUTPATIENT
Start: 2019-09-14 | End: 2019-09-14

## 2019-09-14 RX ORDER — FERROUS SULFATE 325(65) MG
325 TABLET ORAL DAILY
Refills: 0 | Status: DISCONTINUED | OUTPATIENT
Start: 2019-09-14 | End: 2019-09-19

## 2019-09-14 RX ORDER — VANCOMYCIN HCL 1 G
500 VIAL (EA) INTRAVENOUS EVERY 24 HOURS
Refills: 0 | Status: DISCONTINUED | OUTPATIENT
Start: 2019-09-15 | End: 2019-09-18

## 2019-09-14 RX ORDER — ACETAMINOPHEN 500 MG
975 TABLET ORAL EVERY 8 HOURS
Refills: 0 | Status: COMPLETED | OUTPATIENT
Start: 2019-09-15 | End: 2019-09-17

## 2019-09-14 RX ORDER — ACETAMINOPHEN 500 MG
1000 TABLET ORAL ONCE
Refills: 0 | Status: COMPLETED | OUTPATIENT
Start: 2019-09-15 | End: 2019-09-15

## 2019-09-14 RX ORDER — CELECOXIB 200 MG/1
200 CAPSULE ORAL EVERY 12 HOURS
Refills: 0 | Status: DISCONTINUED | OUTPATIENT
Start: 2019-09-16 | End: 2019-09-19

## 2019-09-14 RX ORDER — ACETAMINOPHEN 500 MG
1000 TABLET ORAL ONCE
Refills: 0 | Status: COMPLETED | OUTPATIENT
Start: 2019-09-14 | End: 2019-09-14

## 2019-09-14 RX ORDER — TRAMADOL HYDROCHLORIDE 50 MG/1
50 TABLET ORAL EVERY 6 HOURS
Refills: 0 | Status: DISCONTINUED | OUTPATIENT
Start: 2019-09-14 | End: 2019-09-17

## 2019-09-14 RX ORDER — SODIUM CHLORIDE 9 MG/ML
1000 INJECTION, SOLUTION INTRAVENOUS
Refills: 0 | Status: DISCONTINUED | OUTPATIENT
Start: 2019-09-14 | End: 2019-09-16

## 2019-09-14 RX ORDER — DEXAMETHASONE 0.5 MG/5ML
8 ELIXIR ORAL ONCE
Refills: 0 | Status: COMPLETED | OUTPATIENT
Start: 2019-09-15 | End: 2019-09-15

## 2019-09-14 RX ADMIN — Medication 400 MILLIGRAM(S): at 23:50

## 2019-09-14 RX ADMIN — SODIUM CHLORIDE 75 MILLILITER(S): 9 INJECTION INTRAMUSCULAR; INTRAVENOUS; SUBCUTANEOUS at 05:14

## 2019-09-14 RX ADMIN — LISINOPRIL 10 MILLIGRAM(S): 2.5 TABLET ORAL at 05:13

## 2019-09-14 RX ADMIN — Medication 0.1 MILLIGRAM(S): at 05:13

## 2019-09-14 RX ADMIN — MORPHINE SULFATE 2 MILLIGRAM(S): 50 CAPSULE, EXTENDED RELEASE ORAL at 22:17

## 2019-09-14 RX ADMIN — Medication 15 MILLIGRAM(S): at 20:14

## 2019-09-14 RX ADMIN — MORPHINE SULFATE 2 MILLIGRAM(S): 50 CAPSULE, EXTENDED RELEASE ORAL at 09:39

## 2019-09-14 RX ADMIN — AMLODIPINE BESYLATE 10 MILLIGRAM(S): 2.5 TABLET ORAL at 05:13

## 2019-09-14 RX ADMIN — HYDROMORPHONE HYDROCHLORIDE 0.25 MILLIGRAM(S): 2 INJECTION INTRAMUSCULAR; INTRAVENOUS; SUBCUTANEOUS at 20:10

## 2019-09-14 RX ADMIN — SODIUM CHLORIDE 1000 MILLILITER(S): 9 INJECTION INTRAMUSCULAR; INTRAVENOUS; SUBCUTANEOUS at 22:02

## 2019-09-14 RX ADMIN — Medication 325 MILLIGRAM(S): at 20:14

## 2019-09-14 RX ADMIN — HYDROMORPHONE HYDROCHLORIDE 0.25 MILLIGRAM(S): 2 INJECTION INTRAMUSCULAR; INTRAVENOUS; SUBCUTANEOUS at 20:27

## 2019-09-14 RX ADMIN — MORPHINE SULFATE 2 MILLIGRAM(S): 50 CAPSULE, EXTENDED RELEASE ORAL at 22:02

## 2019-09-14 RX ADMIN — HYDROMORPHONE HYDROCHLORIDE 0.25 MILLIGRAM(S): 2 INJECTION INTRAMUSCULAR; INTRAVENOUS; SUBCUTANEOUS at 19:38

## 2019-09-14 RX ADMIN — Medication 60 MILLIGRAM(S): at 05:13

## 2019-09-14 RX ADMIN — Medication 25 MILLIGRAM(S): at 05:13

## 2019-09-14 RX ADMIN — HYDROMORPHONE HYDROCHLORIDE 4 MILLIGRAM(S): 2 INJECTION INTRAMUSCULAR; INTRAVENOUS; SUBCUTANEOUS at 23:51

## 2019-09-14 RX ADMIN — HYDROMORPHONE HYDROCHLORIDE 0.25 MILLIGRAM(S): 2 INJECTION INTRAMUSCULAR; INTRAVENOUS; SUBCUTANEOUS at 19:21

## 2019-09-14 RX ADMIN — ALBUTEROL 2 PUFF(S): 90 AEROSOL, METERED ORAL at 11:10

## 2019-09-14 RX ADMIN — HYDROMORPHONE HYDROCHLORIDE 4 MILLIGRAM(S): 2 INJECTION INTRAMUSCULAR; INTRAVENOUS; SUBCUTANEOUS at 11:11

## 2019-09-14 RX ADMIN — HYDROMORPHONE HYDROCHLORIDE 0.25 MILLIGRAM(S): 2 INJECTION INTRAMUSCULAR; INTRAVENOUS; SUBCUTANEOUS at 19:57

## 2019-09-14 RX ADMIN — Medication 100 MILLIGRAM(S): at 22:01

## 2019-09-14 RX ADMIN — HYDROMORPHONE HYDROCHLORIDE 4 MILLIGRAM(S): 2 INJECTION INTRAMUSCULAR; INTRAVENOUS; SUBCUTANEOUS at 01:59

## 2019-09-14 RX ADMIN — TIOTROPIUM BROMIDE 1 CAPSULE(S): 18 CAPSULE ORAL; RESPIRATORY (INHALATION) at 11:10

## 2019-09-14 RX ADMIN — HYDROMORPHONE HYDROCHLORIDE 0.25 MILLIGRAM(S): 2 INJECTION INTRAMUSCULAR; INTRAVENOUS; SUBCUTANEOUS at 19:53

## 2019-09-14 RX ADMIN — MORPHINE SULFATE 2 MILLIGRAM(S): 50 CAPSULE, EXTENDED RELEASE ORAL at 04:01

## 2019-09-14 RX ADMIN — MORPHINE SULFATE 15 MILLIGRAM(S): 50 CAPSULE, EXTENDED RELEASE ORAL at 05:13

## 2019-09-14 RX ADMIN — MORPHINE SULFATE 2 MILLIGRAM(S): 50 CAPSULE, EXTENDED RELEASE ORAL at 04:16

## 2019-09-14 RX ADMIN — HYDROMORPHONE HYDROCHLORIDE 4 MILLIGRAM(S): 2 INJECTION INTRAMUSCULAR; INTRAVENOUS; SUBCUTANEOUS at 02:30

## 2019-09-14 RX ADMIN — SODIUM CHLORIDE 1000 MILLILITER(S): 9 INJECTION INTRAMUSCULAR; INTRAVENOUS; SUBCUTANEOUS at 19:20

## 2019-09-14 RX ADMIN — Medication 5 MILLIGRAM(S): at 05:13

## 2019-09-14 RX ADMIN — MORPHINE SULFATE 15 MILLIGRAM(S): 50 CAPSULE, EXTENDED RELEASE ORAL at 22:01

## 2019-09-14 NOTE — PROGRESS NOTE ADULT - SUBJECTIVE AND OBJECTIVE BOX
Subjective: Patient seen and examined. No new events except as noted.     SUBJECTIVE/ROS:  feels ok       MEDICATIONS:  MEDICATIONS  (STANDING):  ALBUTerol    90 MICROgram(s) HFA Inhaler 2 Puff(s) Inhalation every 6 hours  amLODIPine   Tablet 10 milliGRAM(s) Oral daily  cloNIDine 0.1 milliGRAM(s) Oral two times a day  docusate sodium 100 milliGRAM(s) Oral three times a day  lisinopril 10 milliGRAM(s) Oral daily  metoprolol tartrate 25 milliGRAM(s) Oral two times a day  morphine ER Tablet 15 milliGRAM(s) Oral every 8 hours  nicotine - 21 mG/24Hr(s) Patch 1 patch Transdermal daily  NIFEdipine XL 60 milliGRAM(s) Oral daily  pantoprazole  Injectable 40 milliGRAM(s) IV Push daily  senna 2 Tablet(s) Oral at bedtime  sodium chloride 0.9%. 1000 milliLiter(s) (75 mL/Hr) IV Continuous <Continuous>  tiotropium 18 MICROgram(s) Capsule 1 Capsule(s) Inhalation daily      PHYSICAL EXAM:  T(C): 36.4 (09-14-19 @ 05:12), Max: 37 (09-14-19 @ 00:38)  HR: 53 (09-14-19 @ 05:12) (53 - 59)  BP: 126/55 (09-14-19 @ 05:12) (126/55 - 161/75)  RR: 16 (09-14-19 @ 05:12) (16 - 16)  SpO2: 96% (09-14-19 @ 05:12) (93% - 98%)  Wt(kg): --  I&O's Summary    13 Sep 2019 07:01  -  14 Sep 2019 07:00  --------------------------------------------------------  IN: 1650 mL / OUT: 1550 mL / NET: 100 mL            JVP: Normal  Neck: supple  Lung: clear   CV: S1 S2 , Murmur:  Abd: soft  Ext: No edema  neuro: Awake / alert  Psych: flat affect  Skin: normal``    LABS/DATA:    CARDIAC MARKERS:                                11.8   5.3   )-----------( 222      ( 14 Sep 2019 04:24 )             37.5     09-14    138  |  103  |  26<H>  ----------------------------<  103<H>  4.6   |  25  |  0.68    Ca    9.2      14 Sep 2019 04:24      proBNP:   Lipid Profile:   HgA1c:   TSH:     TELE:  EKG:

## 2019-09-14 NOTE — PROGRESS NOTE ADULT - ASSESSMENT
HTN  labile  cont current meds for now     CAD history of stent  cont asa  stable  asymptomatic     AS  Moderate   stable     PreOP  Based on current ACC/AHA guidelines, patient history and physical exam, the patient is considered to have elevated risk  recent cardiac work up unremarkable   no objection to proceed to planned surgery

## 2019-09-14 NOTE — PROGRESS NOTE ADULT - SUBJECTIVE AND OBJECTIVE BOX
Patient is a 73y old  Female who presents with a chief complaint of right hip pain (14 Sep 2019 11:49)        MEDICATIONS  (STANDING):  ALBUTerol    90 MICROgram(s) HFA Inhaler 2 Puff(s) Inhalation every 6 hours  amLODIPine   Tablet 10 milliGRAM(s) Oral daily  cloNIDine 0.1 milliGRAM(s) Oral two times a day  docusate sodium 100 milliGRAM(s) Oral three times a day  lisinopril 10 milliGRAM(s) Oral daily  metoprolol tartrate 25 milliGRAM(s) Oral two times a day  morphine ER Tablet 15 milliGRAM(s) Oral every 8 hours  nicotine - 21 mG/24Hr(s) Patch 1 patch Transdermal daily  NIFEdipine XL 60 milliGRAM(s) Oral daily  pantoprazole  Injectable 40 milliGRAM(s) IV Push daily  senna 2 Tablet(s) Oral at bedtime  sodium chloride 0.9%. 1000 milliLiter(s) (75 mL/Hr) IV Continuous <Continuous>  tiotropium 18 MICROgram(s) Capsule 1 Capsule(s) Inhalation daily    MEDICATIONS  (PRN):  bisacodyl Suppository 10 milliGRAM(s) Rectal daily PRN If no bowel movement by POD#2  diazepam    Tablet 5 milliGRAM(s) Oral every 8 hours PRN anxiety  HYDROmorphone   Tablet 2 milliGRAM(s) Oral every 3 hours PRN Moderate Pain (4 - 6)  HYDROmorphone   Tablet 4 milliGRAM(s) Oral every 3 hours PRN Severe Pain (7 - 10)  magnesium hydroxide Suspension 30 milliLiter(s) Oral daily PRN Constipation  melatonin 3 milliGRAM(s) Oral at bedtime PRN Insomnia  metoclopramide Injectable 5 milliGRAM(s) IV Push every 6 hours PRN Nausea and/or Vomiting  morphine  - Injectable 2 milliGRAM(s) IV Push every 2 hours PRN Severe Pain (7 - 10)          VITALS:   T(C): 37.1 (09-14-19 @ 13:05), Max: 37.1 (09-14-19 @ 13:05)  HR: 50 (09-14-19 @ 13:05) (50 - 59)  BP: 143/82 (09-14-19 @ 13:05) (126/55 - 161/75)  RR: 16 (09-14-19 @ 13:05) (16 - 18)  SpO2: 98% (09-14-19 @ 13:05) (96% - 98%)  Wt(kg): --        LABS:        CBC Full  -  ( 14 Sep 2019 04:24 )  WBC Count : 5.3 K/uL  RBC Count : 4.00 M/uL  Hemoglobin : 11.8 g/dL  Hematocrit : 37.5 %  Platelet Count - Automated : 222 K/uL  Mean Cell Volume : 93.6 fl  Mean Cell Hemoglobin : 29.5 pg  Mean Cell Hemoglobin Concentration : 31.5 gm/dL  Auto Neutrophil # : x  Auto Lymphocyte # : x  Auto Monocyte # : x  Auto Eosinophil # : x  Auto Basophil # : x  Auto Neutrophil % : x  Auto Lymphocyte % : x  Auto Monocyte % : x  Auto Eosinophil % : x  Auto Basophil % : x    09-14    138  |  103  |  26<H>  ----------------------------<  103<H>  4.6   |  25  |  0.68    Ca    9.2      14 Sep 2019 04:24        PT/INR - ( 14 Sep 2019 04:24 )   PT: 11.3 sec;   INR: 0.99 ratio         PTT - ( 14 Sep 2019 04:24 )  PTT:22.5 sec    CAPILLARY BLOOD GLUCOSE          RADIOLOGY & ADDITIONAL TESTS: 73F s/p stage 1 revision R USAMA w/ Dr. Be 4/23/19 sent to ED for admission for stage 2 revision procedure on 9/14. Reports continued R hip pain. Off iv abx. Negative aspiration cx in office. No recent trauma or falls. Saw Dr. Be in the office. No fevers or chills. Patient denies radiation of pain. Patient denies numbness/tingling/burning in the RLE. Has been ambulatory with a 4 point cane. No other complaints at this time.  Patient had a normal echo and stress test in the  past week. Patient s/p revision of hip surgery. Resting comfortably       MEDICATIONS  (STANDING):  ALBUTerol    90 MICROgram(s) HFA Inhaler 2 Puff(s) Inhalation every 6 hours  amLODIPine   Tablet 10 milliGRAM(s) Oral daily  cloNIDine 0.1 milliGRAM(s) Oral two times a day  docusate sodium 100 milliGRAM(s) Oral three times a day  lisinopril 10 milliGRAM(s) Oral daily  metoprolol tartrate 25 milliGRAM(s) Oral two times a day  morphine ER Tablet 15 milliGRAM(s) Oral every 8 hours  nicotine - 21 mG/24Hr(s) Patch 1 patch Transdermal daily  NIFEdipine XL 60 milliGRAM(s) Oral daily  pantoprazole  Injectable 40 milliGRAM(s) IV Push daily  senna 2 Tablet(s) Oral at bedtime  sodium chloride 0.9%. 1000 milliLiter(s) (75 mL/Hr) IV Continuous <Continuous>  tiotropium 18 MICROgram(s) Capsule 1 Capsule(s) Inhalation daily    MEDICATIONS  (PRN):  bisacodyl Suppository 10 milliGRAM(s) Rectal daily PRN If no bowel movement by POD#2  diazepam    Tablet 5 milliGRAM(s) Oral every 8 hours PRN anxiety  HYDROmorphone   Tablet 2 milliGRAM(s) Oral every 3 hours PRN Moderate Pain (4 - 6)  HYDROmorphone   Tablet 4 milliGRAM(s) Oral every 3 hours PRN Severe Pain (7 - 10)  magnesium hydroxide Suspension 30 milliLiter(s) Oral daily PRN Constipation  melatonin 3 milliGRAM(s) Oral at bedtime PRN Insomnia  metoclopramide Injectable 5 milliGRAM(s) IV Push every 6 hours PRN Nausea and/or Vomiting  morphine  - Injectable 2 milliGRAM(s) IV Push every 2 hours PRN Severe Pain (7 - 10)          VITALS:   T(C): 37.1 (09-14-19 @ 13:05), Max: 37.1 (09-14-19 @ 13:05)  HR: 50 (09-14-19 @ 13:05) (50 - 59)  BP: 143/82 (09-14-19 @ 13:05) (126/55 - 161/75)  RR: 16 (09-14-19 @ 13:05) (16 - 18)  SpO2: 98% (09-14-19 @ 13:05) (96% - 98%)  Wt(kg): --    PHYSICAL EXAM:  GENERAL: NAD, well nourished and conversant  HEAD:  Atraumatic  EYES: EOM, PERRLA, conjunctiva pink and sclera white  ENT: No tonsillar erythema, exudates, or enlargement, moist mucous membranes, good dentition, no lesions  NECK: Supple, No JVD, normal thyroid, carotids with normal upstrokes and no bruits  CHEST/LUNG: Clear to auscultation bilaterally, No rales, rhonchi, wheezing, or rubs  HEART: Regular rate and rhythm, No murmurs, rubs, or gallops  ABDOMEN: Soft, nondistended, no masses, guarding, tenderness or rebound, bowel sounds present  EXTREMITIES:  2+ Peripheral Pulses, No clubbing, cyanosis, or edema.    LYMPH: No lymphadenopathy noted  SKIN: No rashes or lesions  NERVOUS SYSTEM:  Alert & Oriented X3, normal cognitive function. Motor Strength 5/5     LABS:        CBC Full  -  ( 14 Sep 2019 04:24 )  WBC Count : 5.3 K/uL  RBC Count : 4.00 M/uL  Hemoglobin : 11.8 g/dL  Hematocrit : 37.5 %  Platelet Count - Automated : 222 K/uL  Mean Cell Volume : 93.6 fl  Mean Cell Hemoglobin : 29.5 pg  Mean Cell Hemoglobin Concentration : 31.5 gm/dL  Auto Neutrophil # : x  Auto Lymphocyte # : x  Auto Monocyte # : x  Auto Eosinophil # : x  Auto Basophil # : x  Auto Neutrophil % : x  Auto Lymphocyte % : x  Auto Monocyte % : x  Auto Eosinophil % : x  Auto Basophil % : x    09-14    138  |  103  |  26<H>  ----------------------------<  103<H>  4.6   |  25  |  0.68    Ca    9.2      14 Sep 2019 04:24        PT/INR - ( 14 Sep 2019 04:24 )   PT: 11.3 sec;   INR: 0.99 ratio         PTT - ( 14 Sep 2019 04:24 )  PTT:22.5 sec    CAPILLARY BLOOD GLUCOSE          RADIOLOGY & ADDITIONAL TESTS:

## 2019-09-14 NOTE — PRE-ANESTHESIA EVALUATION ADULT - NSANTHINPATMEDSFT_GEN_ALL_CORE
today: albuterol, spiriva, amlodipine, procardia, lisinopril, metoprolol, morphine, dilaudid, clonidine, valium

## 2019-09-14 NOTE — PROGRESS NOTE ADULT - ASSESSMENT
Impression: Stable       Plan:   Continue present treatment                 Preop, NPO, surgery today                 Continue to monitor    Ferny Mahoney PA-C  Orthopaedic Surgery  Team pager [phone]  [phone]

## 2019-09-14 NOTE — PRE-ANESTHESIA EVALUATION ADULT - NSANTHPMHFT_GEN_ALL_CORE
74 y/o female; seizure disorder (not on medications), EtOH abuse, CAD w/ stents (last stent 10 years ago), moderate Aortic Stenosis, R THR (5/2018  right hip fracture); periprostetic fracture in July 2018, s/p ORIF of femoral fracture;   s/p stage 1 revision R USAMA w/ Dr. Be 4/23/19; sent to ED for admission for stage 2 revision procedure on 9/14. Reports continued R hip pain. Off iv abx. Negative aspiration cx in office. No recent trauma or falls. Has been ambulatory with a 4 point cane. Patient had a normal echo and stress test in the  past week

## 2019-09-14 NOTE — PROGRESS NOTE ADULT - SUBJECTIVE AND OBJECTIVE BOX
ORTHO  Patient is a 73y old  Female who presents with a chief complaint of right hip pain (13 Sep 2019 18:31)    Pt. resting without complaint    VS-  T(C): 36.4 (09-14-19 @ 05:12), Max: 37 (09-14-19 @ 00:38)  HR: 53 (09-14-19 @ 05:12) (53 - 59)  BP: 126/55 (09-14-19 @ 05:12) (108/58 - 161/75)  RR: 16 (09-14-19 @ 05:12) (16 - 16)  SpO2: 96% (09-14-19 @ 05:12) (93% - 98%)  Wt(kg): --    M.S. Alert  Extremity- Right hip min tenderness with ROM  Neuro-              Motor- (+) ankle DF/PF              Sensation- grossly intact to light touch              Calves- soft, nontender                                   11.8   5.3   )-----------( 222      ( 14 Sep 2019 04:24 )             37.5     09-14    138  |  103  |  26<H>  ----------------------------<  103<H>  4.6   |  25  |  0.68    Ca    9.2      14 Sep 2019 04:24

## 2019-09-14 NOTE — PRE-ANESTHESIA EVALUATION ADULT - NSRADCARDRESULTSFT_GEN_ALL_CORE
TTE 4/2019: EF76% 1. Calcified trileaflet aortic valve with decreased  opening. mean transaortic valve gradient equals 16 mm Hg,  estimated aortic valve area equals 1.4 sqcm (by  planimetry), consistent with moderate aortic stenosis.  Mild-moderate aortic regurgitation, min MR, mod TR, mild PI, LAE/ORALIA  2. Hyperdynamic left ventricular systolic function.  3. Normal right ventricular size and function.    STRESS TEST 5/2018 : IMPRESSIONS:Normal Study  * The left ventricle was normal in size. EF 65%  * Tracer uptake was homogeneous throughout the left  ventricle.  * Normal study; no evidence for myocardial infarction or  ischemia.  * Gated wall motion analysis was performed, and shows  normal wall motion.

## 2019-09-15 LAB
ANION GAP SERPL CALC-SCNC: 15 MMOL/L — SIGNIFICANT CHANGE UP (ref 5–17)
BUN SERPL-MCNC: 28 MG/DL — HIGH (ref 7–23)
CALCIUM SERPL-MCNC: 8.1 MG/DL — LOW (ref 8.4–10.5)
CHLORIDE SERPL-SCNC: 98 MMOL/L — SIGNIFICANT CHANGE UP (ref 96–108)
CO2 SERPL-SCNC: 23 MMOL/L — SIGNIFICANT CHANGE UP (ref 22–31)
CREAT SERPL-MCNC: 0.72 MG/DL — SIGNIFICANT CHANGE UP (ref 0.5–1.3)
GLUCOSE SERPL-MCNC: 135 MG/DL — HIGH (ref 70–99)
GRAM STN FLD: SIGNIFICANT CHANGE UP
HCT VFR BLD CALC: 38.4 % — SIGNIFICANT CHANGE UP (ref 34.5–45)
HGB BLD-MCNC: 11.3 G/DL — LOW (ref 11.5–15.5)
MCHC RBC-ENTMCNC: 28 PG — SIGNIFICANT CHANGE UP (ref 27–34)
MCHC RBC-ENTMCNC: 29.4 GM/DL — LOW (ref 32–36)
MCV RBC AUTO: 95.3 FL — SIGNIFICANT CHANGE UP (ref 80–100)
PLATELET # BLD AUTO: 212 K/UL — SIGNIFICANT CHANGE UP (ref 150–400)
POTASSIUM SERPL-MCNC: 5.2 MMOL/L — SIGNIFICANT CHANGE UP (ref 3.5–5.3)
POTASSIUM SERPL-SCNC: 5.2 MMOL/L — SIGNIFICANT CHANGE UP (ref 3.5–5.3)
RBC # BLD: 4.03 M/UL — SIGNIFICANT CHANGE UP (ref 3.8–5.2)
RBC # FLD: 18 % — HIGH (ref 10.3–14.5)
SODIUM SERPL-SCNC: 136 MMOL/L — SIGNIFICANT CHANGE UP (ref 135–145)
SPECIMEN SOURCE: SIGNIFICANT CHANGE UP
WBC # BLD: 8.83 K/UL — SIGNIFICANT CHANGE UP (ref 3.8–10.5)
WBC # FLD AUTO: 8.83 K/UL — SIGNIFICANT CHANGE UP (ref 3.8–10.5)

## 2019-09-15 RX ORDER — POLYETHYLENE GLYCOL 3350 17 G/17G
17 POWDER, FOR SOLUTION ORAL ONCE
Refills: 0 | Status: COMPLETED | OUTPATIENT
Start: 2019-09-15 | End: 2019-09-15

## 2019-09-15 RX ADMIN — TRAMADOL HYDROCHLORIDE 50 MILLIGRAM(S): 50 TABLET ORAL at 12:13

## 2019-09-15 RX ADMIN — Medication 100 MILLIGRAM(S): at 05:24

## 2019-09-15 RX ADMIN — MORPHINE SULFATE 2 MILLIGRAM(S): 50 CAPSULE, EXTENDED RELEASE ORAL at 01:15

## 2019-09-15 RX ADMIN — HYDROMORPHONE HYDROCHLORIDE 4 MILLIGRAM(S): 2 INJECTION INTRAMUSCULAR; INTRAVENOUS; SUBCUTANEOUS at 23:38

## 2019-09-15 RX ADMIN — HYDROMORPHONE HYDROCHLORIDE 4 MILLIGRAM(S): 2 INJECTION INTRAMUSCULAR; INTRAVENOUS; SUBCUTANEOUS at 14:21

## 2019-09-15 RX ADMIN — HYDROMORPHONE HYDROCHLORIDE 4 MILLIGRAM(S): 2 INJECTION INTRAMUSCULAR; INTRAVENOUS; SUBCUTANEOUS at 09:00

## 2019-09-15 RX ADMIN — MORPHINE SULFATE 15 MILLIGRAM(S): 50 CAPSULE, EXTENDED RELEASE ORAL at 05:23

## 2019-09-15 RX ADMIN — MORPHINE SULFATE 15 MILLIGRAM(S): 50 CAPSULE, EXTENDED RELEASE ORAL at 22:16

## 2019-09-15 RX ADMIN — HYDROMORPHONE HYDROCHLORIDE 4 MILLIGRAM(S): 2 INJECTION INTRAMUSCULAR; INTRAVENOUS; SUBCUTANEOUS at 14:51

## 2019-09-15 RX ADMIN — MORPHINE SULFATE 2 MILLIGRAM(S): 50 CAPSULE, EXTENDED RELEASE ORAL at 21:46

## 2019-09-15 RX ADMIN — MORPHINE SULFATE 2 MILLIGRAM(S): 50 CAPSULE, EXTENDED RELEASE ORAL at 19:41

## 2019-09-15 RX ADMIN — PANTOPRAZOLE SODIUM 40 MILLIGRAM(S): 20 TABLET, DELAYED RELEASE ORAL at 05:23

## 2019-09-15 RX ADMIN — Medication 0.1 MILLIGRAM(S): at 05:16

## 2019-09-15 RX ADMIN — Medication 25 MILLIGRAM(S): at 16:56

## 2019-09-15 RX ADMIN — MORPHINE SULFATE 2 MILLIGRAM(S): 50 CAPSULE, EXTENDED RELEASE ORAL at 05:41

## 2019-09-15 RX ADMIN — AMLODIPINE BESYLATE 10 MILLIGRAM(S): 2.5 TABLET ORAL at 05:16

## 2019-09-15 RX ADMIN — ALBUTEROL 2 PUFF(S): 90 AEROSOL, METERED ORAL at 17:01

## 2019-09-15 RX ADMIN — Medication 975 MILLIGRAM(S): at 22:16

## 2019-09-15 RX ADMIN — HYDROMORPHONE HYDROCHLORIDE 4 MILLIGRAM(S): 2 INJECTION INTRAMUSCULAR; INTRAVENOUS; SUBCUTANEOUS at 03:27

## 2019-09-15 RX ADMIN — MORPHINE SULFATE 15 MILLIGRAM(S): 50 CAPSULE, EXTENDED RELEASE ORAL at 21:46

## 2019-09-15 RX ADMIN — MORPHINE SULFATE 2 MILLIGRAM(S): 50 CAPSULE, EXTENDED RELEASE ORAL at 13:17

## 2019-09-15 RX ADMIN — Medication 101.6 MILLIGRAM(S): at 06:52

## 2019-09-15 RX ADMIN — Medication 1000 MILLIGRAM(S): at 00:59

## 2019-09-15 RX ADMIN — Medication 15 MILLIGRAM(S): at 12:30

## 2019-09-15 RX ADMIN — Medication 60 MILLIGRAM(S): at 05:16

## 2019-09-15 RX ADMIN — MORPHINE SULFATE 2 MILLIGRAM(S): 50 CAPSULE, EXTENDED RELEASE ORAL at 16:57

## 2019-09-15 RX ADMIN — MORPHINE SULFATE 2 MILLIGRAM(S): 50 CAPSULE, EXTENDED RELEASE ORAL at 22:01

## 2019-09-15 RX ADMIN — MORPHINE SULFATE 2 MILLIGRAM(S): 50 CAPSULE, EXTENDED RELEASE ORAL at 15:20

## 2019-09-15 RX ADMIN — MORPHINE SULFATE 2 MILLIGRAM(S): 50 CAPSULE, EXTENDED RELEASE ORAL at 13:02

## 2019-09-15 RX ADMIN — Medication 325 MILLIGRAM(S): at 21:46

## 2019-09-15 RX ADMIN — MORPHINE SULFATE 2 MILLIGRAM(S): 50 CAPSULE, EXTENDED RELEASE ORAL at 17:12

## 2019-09-15 RX ADMIN — TRAMADOL HYDROCHLORIDE 50 MILLIGRAM(S): 50 TABLET ORAL at 00:07

## 2019-09-15 RX ADMIN — HYDROMORPHONE HYDROCHLORIDE 4 MILLIGRAM(S): 2 INJECTION INTRAMUSCULAR; INTRAVENOUS; SUBCUTANEOUS at 23:08

## 2019-09-15 RX ADMIN — POLYETHYLENE GLYCOL 3350 17 GRAM(S): 17 POWDER, FOR SOLUTION ORAL at 21:46

## 2019-09-15 RX ADMIN — MORPHINE SULFATE 2 MILLIGRAM(S): 50 CAPSULE, EXTENDED RELEASE ORAL at 01:00

## 2019-09-15 RX ADMIN — HYDROMORPHONE HYDROCHLORIDE 4 MILLIGRAM(S): 2 INJECTION INTRAMUSCULAR; INTRAVENOUS; SUBCUTANEOUS at 19:34

## 2019-09-15 RX ADMIN — MORPHINE SULFATE 2 MILLIGRAM(S): 50 CAPSULE, EXTENDED RELEASE ORAL at 10:22

## 2019-09-15 RX ADMIN — Medication 15 MILLIGRAM(S): at 05:32

## 2019-09-15 RX ADMIN — MORPHINE SULFATE 2 MILLIGRAM(S): 50 CAPSULE, EXTENDED RELEASE ORAL at 05:26

## 2019-09-15 RX ADMIN — TIOTROPIUM BROMIDE 1 CAPSULE(S): 18 CAPSULE ORAL; RESPIRATORY (INHALATION) at 12:14

## 2019-09-15 RX ADMIN — SODIUM CHLORIDE 80 MILLILITER(S): 9 INJECTION, SOLUTION INTRAVENOUS at 05:18

## 2019-09-15 RX ADMIN — LISINOPRIL 10 MILLIGRAM(S): 2.5 TABLET ORAL at 05:16

## 2019-09-15 RX ADMIN — Medication 5 MILLIGRAM(S): at 02:23

## 2019-09-15 RX ADMIN — Medication 100 MILLIGRAM(S): at 21:46

## 2019-09-15 RX ADMIN — Medication 975 MILLIGRAM(S): at 21:46

## 2019-09-15 RX ADMIN — MORPHINE SULFATE 2 MILLIGRAM(S): 50 CAPSULE, EXTENDED RELEASE ORAL at 15:05

## 2019-09-15 RX ADMIN — HYDROMORPHONE HYDROCHLORIDE 4 MILLIGRAM(S): 2 INJECTION INTRAMUSCULAR; INTRAVENOUS; SUBCUTANEOUS at 09:30

## 2019-09-15 RX ADMIN — Medication 1 TABLET(S): at 12:13

## 2019-09-15 RX ADMIN — TRAMADOL HYDROCHLORIDE 50 MILLIGRAM(S): 50 TABLET ORAL at 05:27

## 2019-09-15 RX ADMIN — MORPHINE SULFATE 2 MILLIGRAM(S): 50 CAPSULE, EXTENDED RELEASE ORAL at 10:07

## 2019-09-15 RX ADMIN — HYDROMORPHONE HYDROCHLORIDE 4 MILLIGRAM(S): 2 INJECTION INTRAMUSCULAR; INTRAVENOUS; SUBCUTANEOUS at 03:42

## 2019-09-15 RX ADMIN — Medication 15 MILLIGRAM(S): at 22:16

## 2019-09-15 RX ADMIN — TRAMADOL HYDROCHLORIDE 50 MILLIGRAM(S): 50 TABLET ORAL at 05:23

## 2019-09-15 RX ADMIN — Medication 325 MILLIGRAM(S): at 12:13

## 2019-09-15 RX ADMIN — Medication 15 MILLIGRAM(S): at 21:45

## 2019-09-15 RX ADMIN — SENNA PLUS 2 TABLET(S): 8.6 TABLET ORAL at 21:47

## 2019-09-15 RX ADMIN — HYDROMORPHONE HYDROCHLORIDE 4 MILLIGRAM(S): 2 INJECTION INTRAMUSCULAR; INTRAVENOUS; SUBCUTANEOUS at 00:21

## 2019-09-15 RX ADMIN — Medication 15 MILLIGRAM(S): at 05:20

## 2019-09-15 RX ADMIN — HYDROMORPHONE HYDROCHLORIDE 4 MILLIGRAM(S): 2 INJECTION INTRAMUSCULAR; INTRAVENOUS; SUBCUTANEOUS at 18:54

## 2019-09-15 RX ADMIN — MORPHINE SULFATE 15 MILLIGRAM(S): 50 CAPSULE, EXTENDED RELEASE ORAL at 13:32

## 2019-09-15 RX ADMIN — ALBUTEROL 2 PUFF(S): 90 AEROSOL, METERED ORAL at 12:14

## 2019-09-15 RX ADMIN — Medication 25 MILLIGRAM(S): at 05:16

## 2019-09-15 RX ADMIN — MORPHINE SULFATE 2 MILLIGRAM(S): 50 CAPSULE, EXTENDED RELEASE ORAL at 19:56

## 2019-09-15 RX ADMIN — Medication 15 MILLIGRAM(S): at 12:13

## 2019-09-15 RX ADMIN — Medication 100 MILLIGRAM(S): at 05:17

## 2019-09-15 RX ADMIN — TRAMADOL HYDROCHLORIDE 50 MILLIGRAM(S): 50 TABLET ORAL at 12:43

## 2019-09-15 RX ADMIN — Medication 0.1 MILLIGRAM(S): at 16:56

## 2019-09-15 RX ADMIN — Medication 325 MILLIGRAM(S): at 09:00

## 2019-09-15 RX ADMIN — TRAMADOL HYDROCHLORIDE 50 MILLIGRAM(S): 50 TABLET ORAL at 00:59

## 2019-09-15 RX ADMIN — Medication 5 MILLIGRAM(S): at 12:29

## 2019-09-15 RX ADMIN — MORPHINE SULFATE 15 MILLIGRAM(S): 50 CAPSULE, EXTENDED RELEASE ORAL at 13:02

## 2019-09-15 NOTE — PROGRESS NOTE ADULT - SUBJECTIVE AND OBJECTIVE BOX
73F s/p stage 1 revision R USAMA w/ Dr. Be 4/23/19 sent to ED for admission for stage 2 revision procedure on 9/14. Reports continued R hip pain. Off iv abx. Negative aspiration cx in office. No recent trauma or falls. Saw Dr. Be in the office. No fevers or chills. Patient denies radiation of pain. Patient denies numbness/tingling/burning in the RLE. Has been ambulatory with a 4 point cane. No other complaints at this time.  Patient had a normal echo and stress test in the  past week. Patient s/p revision of hip surgery. Resting comfortably     MEDICATIONS  (STANDING):  acetaminophen   Tablet .. 975 milliGRAM(s) Oral every 8 hours  ALBUTerol    90 MICROgram(s) HFA Inhaler 2 Puff(s) Inhalation every 6 hours  amLODIPine   Tablet 10 milliGRAM(s) Oral daily  aspirin enteric coated 325 milliGRAM(s) Oral every 12 hours  cloNIDine 0.1 milliGRAM(s) Oral two times a day  docusate sodium 100 milliGRAM(s) Oral three times a day  ferrous    sulfate 325 milliGRAM(s) Oral daily  ketorolac   Injectable 15 milliGRAM(s) IV Push every 8 hours  lactated ringers. 1000 milliLiter(s) (80 mL/Hr) IV Continuous <Continuous>  lisinopril 10 milliGRAM(s) Oral daily  metoprolol tartrate 25 milliGRAM(s) Oral two times a day  morphine ER Tablet 15 milliGRAM(s) Oral every 8 hours  multivitamin 1 Tablet(s) Oral daily  nicotine - 21 mG/24Hr(s) Patch 1 patch Transdermal daily  NIFEdipine XL 60 milliGRAM(s) Oral daily  pantoprazole    Tablet 40 milliGRAM(s) Oral before breakfast  senna 2 Tablet(s) Oral at bedtime  tiotropium 18 MICROgram(s) Capsule 1 Capsule(s) Inhalation daily  traMADol 50 milliGRAM(s) Oral every 6 hours  vancomycin  IVPB 500 milliGRAM(s) IV Intermittent every 24 hours    MEDICATIONS  (PRN):  bisacodyl Suppository 10 milliGRAM(s) Rectal daily PRN If no bowel movement by POD#2  diazepam    Tablet 5 milliGRAM(s) Oral every 8 hours PRN anxiety  HYDROmorphone   Tablet 2 milliGRAM(s) Oral every 3 hours PRN Moderate Pain (4 - 6)  HYDROmorphone   Tablet 4 milliGRAM(s) Oral every 3 hours PRN Severe Pain (7 - 10)  magnesium hydroxide Suspension 30 milliLiter(s) Oral daily PRN Constipation  melatonin 3 milliGRAM(s) Oral at bedtime PRN Insomnia  metoclopramide Injectable 5 milliGRAM(s) IV Push every 6 hours PRN Nausea and/or Vomiting  morphine  - Injectable 2 milliGRAM(s) IV Push every 2 hours PRN Severe Pain (7 - 10)          VITALS:   T(C): 36.7 (09-15-19 @ 21:24), Max: 36.8 (09-14-19 @ 22:50)  HR: 73 (09-15-19 @ 21:24) (55 - 73)  BP: 113/71 (09-15-19 @ 21:24) (113/71 - 161/65)  RR: 18 (09-15-19 @ 21:24) (16 - 18)  SpO2: 93% (09-15-19 @ 21:24) (89% - 96%)  Wt(kg): --    PHYSICAL EXAM:  GENERAL: NAD, well nourished and conversant  HEAD:  Atraumatic  EYES: EOM, PERRLA, conjunctiva pink and sclera white  ENT: No tonsillar erythema, exudates, or enlargement, moist mucous membranes, good dentition, no lesions  NECK: Supple, No JVD, normal thyroid, carotids with normal upstrokes and no bruits  CHEST/LUNG: Clear to auscultation bilaterally, No rales, rhonchi, wheezing, or rubs  HEART: Regular rate and rhythm, No murmurs, rubs, or gallops  ABDOMEN: Soft, nondistended, no masses, guarding, tenderness or rebound, bowel sounds present  EXTREMITIES:  2+ Peripheral Pulses, No clubbing, cyanosis, or edema.  Right hip pain and immobility for or in AM  LYMPH: No lymphadenopathy noted  SKIN: No rashes or lesions  NERVOUS SYSTEM:  Alert & Oriented X3, normal cognitive function. Motor Strength 5/5       LABS:        CBC Full  -  ( 15 Sep 2019 08:57 )  WBC Count : 8.83 K/uL  RBC Count : 4.03 M/uL  Hemoglobin : 11.3 g/dL  Hematocrit : 38.4 %  Platelet Count - Automated : 212 K/uL  Mean Cell Volume : 95.3 fl  Mean Cell Hemoglobin : 28.0 pg  Mean Cell Hemoglobin Concentration : 29.4 gm/dL  Auto Neutrophil # : x  Auto Lymphocyte # : x  Auto Monocyte # : x  Auto Eosinophil # : x  Auto Basophil # : x  Auto Neutrophil % : x  Auto Lymphocyte % : x  Auto Monocyte % : x  Auto Eosinophil % : x  Auto Basophil % : x    09-15    136  |  98  |  28<H>  ----------------------------<  135<H>  5.2   |  23  |  0.72    Ca    8.1<L>      15 Sep 2019 06:24        PT/INR - ( 14 Sep 2019 04:24 )   PT: 11.3 sec;   INR: 0.99 ratio         PTT - ( 14 Sep 2019 04:24 )  PTT:22.5 sec    CAPILLARY BLOOD GLUCOSE          RADIOLOGY & ADDITIONAL TESTS: 73F s/p stage 1 revision R USAMA w/ Dr. Be 4/23/19 sent to ED for admission for stage 2 revision procedure on 9/14. Reports continued R hip pain. Off iv abx. Negative aspiration cx in office. No recent trauma or falls. Saw Dr. Be in the office. No fevers or chills. Patient denies radiation of pain. Patient denies numbness/tingling/burning in the RLE. Has been ambulatory with a 4 point cane. No other complaints at this time.  Patient had a normal echo and stress test in the  past week. Patient s/p revision of hip surgery. Resting comfortably     MEDICATIONS  (STANDING):  acetaminophen   Tablet .. 975 milliGRAM(s) Oral every 8 hours  ALBUTerol    90 MICROgram(s) HFA Inhaler 2 Puff(s) Inhalation every 6 hours  amLODIPine   Tablet 10 milliGRAM(s) Oral daily  aspirin enteric coated 325 milliGRAM(s) Oral every 12 hours  cloNIDine 0.1 milliGRAM(s) Oral two times a day  docusate sodium 100 milliGRAM(s) Oral three times a day  ferrous    sulfate 325 milliGRAM(s) Oral daily  ketorolac   Injectable 15 milliGRAM(s) IV Push every 8 hours  lactated ringers. 1000 milliLiter(s) (80 mL/Hr) IV Continuous <Continuous>  lisinopril 10 milliGRAM(s) Oral daily  metoprolol tartrate 25 milliGRAM(s) Oral two times a day  morphine ER Tablet 15 milliGRAM(s) Oral every 8 hours  multivitamin 1 Tablet(s) Oral daily  nicotine - 21 mG/24Hr(s) Patch 1 patch Transdermal daily  NIFEdipine XL 60 milliGRAM(s) Oral daily  pantoprazole    Tablet 40 milliGRAM(s) Oral before breakfast  senna 2 Tablet(s) Oral at bedtime  tiotropium 18 MICROgram(s) Capsule 1 Capsule(s) Inhalation daily  traMADol 50 milliGRAM(s) Oral every 6 hours  vancomycin  IVPB 500 milliGRAM(s) IV Intermittent every 24 hours    MEDICATIONS  (PRN):  bisacodyl Suppository 10 milliGRAM(s) Rectal daily PRN If no bowel movement by POD#2  diazepam    Tablet 5 milliGRAM(s) Oral every 8 hours PRN anxiety  HYDROmorphone   Tablet 2 milliGRAM(s) Oral every 3 hours PRN Moderate Pain (4 - 6)  HYDROmorphone   Tablet 4 milliGRAM(s) Oral every 3 hours PRN Severe Pain (7 - 10)  magnesium hydroxide Suspension 30 milliLiter(s) Oral daily PRN Constipation  melatonin 3 milliGRAM(s) Oral at bedtime PRN Insomnia  metoclopramide Injectable 5 milliGRAM(s) IV Push every 6 hours PRN Nausea and/or Vomiting  morphine  - Injectable 2 milliGRAM(s) IV Push every 2 hours PRN Severe Pain (7 - 10)          VITALS:   T(C): 36.7 (09-15-19 @ 21:24), Max: 36.8 (09-14-19 @ 22:50)  HR: 73 (09-15-19 @ 21:24) (55 - 73)  BP: 113/71 (09-15-19 @ 21:24) (113/71 - 161/65)  RR: 18 (09-15-19 @ 21:24) (16 - 18)  SpO2: 93% (09-15-19 @ 21:24) (89% - 96%)  Wt(kg): --    PHYSICAL EXAM:  GENERAL: NAD, well nourished and conversant  HEAD:  Atraumatic  EYES: EOM, PERRLA, conjunctiva pink and sclera white  ENT: No tonsillar erythema, exudates, or enlargement, moist mucous membranes, good dentition, no lesions  NECK: Supple, No JVD, normal thyroid, carotids with normal upstrokes and no bruits  CHEST/LUNG: Clear to auscultation bilaterally, No rales, rhonchi, wheezing, or rubs  HEART: Regular rate and rhythm, No murmurs, rubs, or gallops  ABDOMEN: Soft, nondistended, no masses, guarding, tenderness or rebound, bowel sounds present  EXTREMITIES:  2+ Peripheral Pulses, No clubbing, cyanosis, or edema.   LYMPH: No lymphadenopathy noted  SKIN: No rashes or lesions  NERVOUS SYSTEM:  Alert & Oriented X3, normal cognitive function. Motor Strength 5/5       LABS:        CBC Full  -  ( 15 Sep 2019 08:57 )  WBC Count : 8.83 K/uL  RBC Count : 4.03 M/uL  Hemoglobin : 11.3 g/dL  Hematocrit : 38.4 %  Platelet Count - Automated : 212 K/uL  Mean Cell Volume : 95.3 fl  Mean Cell Hemoglobin : 28.0 pg  Mean Cell Hemoglobin Concentration : 29.4 gm/dL  Auto Neutrophil # : x  Auto Lymphocyte # : x  Auto Monocyte # : x  Auto Eosinophil # : x  Auto Basophil # : x  Auto Neutrophil % : x  Auto Lymphocyte % : x  Auto Monocyte % : x  Auto Eosinophil % : x  Auto Basophil % : x    09-15    136  |  98  |  28<H>  ----------------------------<  135<H>  5.2   |  23  |  0.72    Ca    8.1<L>      15 Sep 2019 06:24        PT/INR - ( 14 Sep 2019 04:24 )   PT: 11.3 sec;   INR: 0.99 ratio         PTT - ( 14 Sep 2019 04:24 )  PTT:22.5 sec    CAPILLARY BLOOD GLUCOSE          RADIOLOGY & ADDITIONAL TESTS:

## 2019-09-15 NOTE — OCCUPATIONAL THERAPY INITIAL EVALUATION ADULT - BALANCE TRAINING, PT EVAL
Goal: Pt will demonstrate G dynamic standing balance with RW in 2 weeks to increase safety for ADL performance.

## 2019-09-15 NOTE — OCCUPATIONAL THERAPY INITIAL EVALUATION ADULT - BED MOBILITY TRAINING, PT EVAL
Goal: Pt will complete supine<>sit using safe technique while adhering to total hip precautions in 2 weeks.

## 2019-09-15 NOTE — PROGRESS NOTE ADULT - SUBJECTIVE AND OBJECTIVE BOX
Subjective: Patient seen and examined. No new events except as noted.     SUBJECTIVE/ROS:  No chest pain, dyspnea, palpitation, or dizziness.       MEDICATIONS:  MEDICATIONS  (STANDING):  acetaminophen   Tablet .. 975 milliGRAM(s) Oral every 8 hours  ALBUTerol    90 MICROgram(s) HFA Inhaler 2 Puff(s) Inhalation every 6 hours  amLODIPine   Tablet 10 milliGRAM(s) Oral daily  aspirin enteric coated 325 milliGRAM(s) Oral every 12 hours  cloNIDine 0.1 milliGRAM(s) Oral two times a day  dexamethasone  IVPB 8 milliGRAM(s) IV Intermittent once  docusate sodium 100 milliGRAM(s) Oral three times a day  ferrous    sulfate 325 milliGRAM(s) Oral daily  ketorolac   Injectable 15 milliGRAM(s) IV Push every 8 hours  lactated ringers. 1000 milliLiter(s) (80 mL/Hr) IV Continuous <Continuous>  lisinopril 10 milliGRAM(s) Oral daily  metoprolol tartrate 25 milliGRAM(s) Oral two times a day  morphine ER Tablet 15 milliGRAM(s) Oral every 8 hours  multivitamin 1 Tablet(s) Oral daily  nicotine - 21 mG/24Hr(s) Patch 1 patch Transdermal daily  NIFEdipine XL 60 milliGRAM(s) Oral daily  pantoprazole    Tablet 40 milliGRAM(s) Oral before breakfast  senna 2 Tablet(s) Oral at bedtime  tiotropium 18 MICROgram(s) Capsule 1 Capsule(s) Inhalation daily  traMADol 50 milliGRAM(s) Oral every 6 hours  vancomycin  IVPB 500 milliGRAM(s) IV Intermittent every 24 hours      PHYSICAL EXAM:  T(C): 36.3 (09-15-19 @ 04:36), Max: 37.1 (09-14-19 @ 13:05)  HR: 60 (09-15-19 @ 04:36) (50 - 64)  BP: 161/65 (09-15-19 @ 04:36) (108/55 - 161/75)  RR: 16 (09-15-19 @ 04:36) (14 - 18)  SpO2: 96% (09-15-19 @ 04:36) (91% - 100%)  Wt(kg): --  I&O's Summary    13 Sep 2019 07:01  -  14 Sep 2019 07:00  --------------------------------------------------------  IN: 1650 mL / OUT: 1550 mL / NET: 100 mL    14 Sep 2019 07:01  -  15 Sep 2019 06:43  --------------------------------------------------------  IN: 1575 mL / OUT: 1150 mL / NET: 425 mL      Height (cm): 157.48 (09-14 @ 12:05)  Weight (kg): 65.2 (09-14 @ 12:05)  BMI (kg/m2): 26.3 (09-14 @ 12:05)  BSA (m2): 1.66 (09-14 @ 12:05)      JVP: Normal  Neck: supple  Lung: clear   CV: S1 S2 , Murmur:  Abd: soft  Ext: No edema  neuro: Awake / alert  Psych: flat affect  Skin: normal``    LABS/DATA:    CARDIAC MARKERS:                                11.9   9.3   )-----------( 226      ( 14 Sep 2019 19:34 )             36.5     09-14    137  |  101  |  26<H>  ----------------------------<  110<H>  5.1   |  21<L>  |  0.74    Ca    8.1<L>      14 Sep 2019 19:33      proBNP:   Lipid Profile:   HgA1c:   TSH:     TELE:  EKG:

## 2019-09-15 NOTE — PROGRESS NOTE ADULT - SUBJECTIVE AND OBJECTIVE BOX
Post op Day [1]    Patient seen bedside with c/o having R Hip pain and reports hip abduction pillow is uncomfortable and not letting her get good sleep.  No chest pain, SOB, N/V.    T(C): 36.3 (09-15-19 @ 04:36), Max: 37.1 (09-14-19 @ 13:05)  HR: 60 (09-15-19 @ 04:36) (50 - 64)  BP: 161/65 (09-15-19 @ 04:36) (108/55 - 161/75)  RR: 16 (09-15-19 @ 04:36) (14 - 18)  SpO2: 96% (09-15-19 @ 04:36) (91% - 100%)      Exam:  Alert and Oriented, No Acute Distress    Lower Extremities: R Hip  Dressing: C/D/I w/ gauze and surgical film, Abd pillow in place  Calves Soft, Non-tender bilaterally  +PF/DF/EHL/FHL  SILT  +DP Pulse                            11.9   9.3   )-----------( 226      ( 14 Sep 2019 19:34 )             36.5    09-15    136  |  98  |  28<H>  ----------------------------<  135<H>  5.2   |  23  |  0.72    Ca    8.1<L>      15 Sep 2019 06:24

## 2019-09-15 NOTE — OCCUPATIONAL THERAPY INITIAL EVALUATION ADULT - ANTICIPATED DISCHARGE DISPOSITION, OT EVAL
Home OT to increase independence with ADLs, IADLs, functional mobility and assess safety in the home environment./home w/ OT

## 2019-09-15 NOTE — PHYSICAL THERAPY INITIAL EVALUATION ADULT - ADDITIONAL COMMENTS
Patient lives with spouse in house that has stair lift to second floor. PTA pt was independent with adl's and used cane for ambulation. Patient lives with spouse in house that has stair lift to enter home and to second floor. PTA pt was independent with adl's and used cane for ambulation. Pt has tub. (+) driving.

## 2019-09-15 NOTE — PROGRESS NOTE ADULT - ASSESSMENT
A/p: 73y Female s/p R Total Hip Arthroplasty stage 2 Revision 2/2 Prosthetic Joint Infection.  VSS. NAD.    PT/OT-30% PWB, Abd pillow while in bed.  Continue with IV Abx  F/U AM Labs  F/U OR Cx  IS  DVT PPx: Aspirin 325mg Q12H PO, SCDs  Pain Control  Continue Current Tx.    George Holden PA-C  Team Pager: #[phone]

## 2019-09-15 NOTE — OCCUPATIONAL THERAPY INITIAL EVALUATION ADULT - RANGE OF MOTION EXAMINATION, UPPER EXTREMITY
bilateral UE Active ROM was WFL  (within functional limits) Head is atraumatic. Head shape is symmetrical.

## 2019-09-15 NOTE — PROGRESS NOTE ADULT - ASSESSMENT
HTN  labile  cont current meds for now     CAD history of stent  cont asa  stable  asymptomatic     AS  Moderate   stable     PreOP  Based on current ACC/AHA guidelines, patient history and physical exam, the patient is considered to have elevated risk  recent cardiac work up unremarkable   no objection to proceed to planned surgery     FU labs today

## 2019-09-15 NOTE — OCCUPATIONAL THERAPY INITIAL EVALUATION ADULT - PERTINENT HX OF CURRENT PROBLEM, REHAB EVAL
73F s/p stage 1 revision R USAMA w/ Dr. Be 4/23/19 sent to ED for admission for stage 2 revision procedure on 9/14. Reports continued R hip pain. Saw Dr. Be in the office today. Pt denies radiation of pain. Pt denies numbness/tingling/burning in the RLE. Has been ambulatory with a 4 point cane. Saw her outpatient cardiology Dr. Tera Romero last week.

## 2019-09-15 NOTE — PHYSICAL THERAPY INITIAL EVALUATION ADULT - STRENGTHENING, PT EVAL
GOAL: Pt will improve RLE strength to 3+/5, LLE grossly 4/5, for increased limb stability, to improve gait in 2 weeks.

## 2019-09-15 NOTE — PROGRESS NOTE ADULT - PROBLEM SELECTOR PLAN 1
Patient post op   Patient seem pain med seeking  use pain meds with caution  physical therapy as tolerated

## 2019-09-15 NOTE — OCCUPATIONAL THERAPY INITIAL EVALUATION ADULT - ADDITIONAL COMMENTS
X-Ray Pelvis 9/14: Status post right total hip revision arthroplasty.  Pt s/p stage 2 explantation of right hip antibiotic spacer, posterior hip replacement 9/14/19.

## 2019-09-15 NOTE — PHYSICAL THERAPY INITIAL EVALUATION ADULT - MD ORDER
PT Eval & Treat; Activity: Ambulate OOB to chair, RLE PWB 30%, LLE WBAT. Abduction pillow while in bed.

## 2019-09-15 NOTE — OCCUPATIONAL THERAPY INITIAL EVALUATION ADULT - PERSONAL SAFETY AND JUDGMENT, REHAB EVAL
impaired/Pt is very impulsive, and tangential at times. Pt requires cuing to slow pace throughout, although has good understanding and maintenance of RLE PWB.

## 2019-09-15 NOTE — PHYSICAL THERAPY INITIAL EVALUATION ADULT - PRECAUTIONS/LIMITATIONS, REHAB EVAL
CONT: Xray pelvis: RLE USAMA. Xray R hip 9/11: Unchanged appearance of right hip prosthesis, without evidence of acute complication. Xray R femur: Redemonstrated cement spacer in the proximal right femur. Redemonstrated heterotopic ossification about the proximal right femur. No acute displaced fracture is seen in the femur. Multiple rounded lucent hardware tracts in the distal femoral shaft. Arthritic changes of the knee. Vascular calcifications. CXR: (-).

## 2019-09-15 NOTE — PHYSICAL THERAPY INITIAL EVALUATION ADULT - PERTINENT HX OF CURRENT PROBLEM, REHAB EVAL
72 y/o F, PMH: seizure disorder (not on medications), EtOH abuse, CAD w/ stents (last stent 10 years ago), AS, R THR (2/2 to right hip fracture) course c/b periprostetic fracture in July 2018. Pt s/p scheduled R USAMA stage 2 revision POSTERIOR approach 2/2 prosthetic joint infection on 9/14.

## 2019-09-15 NOTE — OCCUPATIONAL THERAPY INITIAL EVALUATION ADULT - LIVES WITH, PROFILE
spouse/Pt lives alone with her  in a private home, all stairs both outside and inside the home have a stair lift which pt can utilize. Pt was independent with ADLs prior to admission and utilized a cane. She has both a stall shower and tub and was driving prior to admission.

## 2019-09-16 ENCOUNTER — TRANSCRIPTION ENCOUNTER (OUTPATIENT)
Age: 74
End: 2019-09-16

## 2019-09-16 RX ORDER — ASPIRIN/CALCIUM CARB/MAGNESIUM 324 MG
1 TABLET ORAL
Qty: 0 | Refills: 0 | DISCHARGE

## 2019-09-16 RX ORDER — ASPIRIN/CALCIUM CARB/MAGNESIUM 324 MG
1 TABLET ORAL
Qty: 0 | Refills: 0 | DISCHARGE
Start: 2019-09-16

## 2019-09-16 RX ORDER — ACETAMINOPHEN 500 MG
3 TABLET ORAL
Qty: 0 | Refills: 0 | DISCHARGE
Start: 2019-09-16

## 2019-09-16 RX ORDER — MORPHINE SULFATE 50 MG/1
1 CAPSULE, EXTENDED RELEASE ORAL
Qty: 0 | Refills: 0 | DISCHARGE
Start: 2019-09-16

## 2019-09-16 RX ORDER — POLYETHYLENE GLYCOL 3350 17 G/17G
17 POWDER, FOR SOLUTION ORAL
Refills: 0 | Status: DISCONTINUED | OUTPATIENT
Start: 2019-09-16 | End: 2019-09-19

## 2019-09-16 RX ADMIN — MORPHINE SULFATE 15 MILLIGRAM(S): 50 CAPSULE, EXTENDED RELEASE ORAL at 13:38

## 2019-09-16 RX ADMIN — MORPHINE SULFATE 2 MILLIGRAM(S): 50 CAPSULE, EXTENDED RELEASE ORAL at 11:22

## 2019-09-16 RX ADMIN — MORPHINE SULFATE 2 MILLIGRAM(S): 50 CAPSULE, EXTENDED RELEASE ORAL at 03:47

## 2019-09-16 RX ADMIN — ALBUTEROL 2 PUFF(S): 90 AEROSOL, METERED ORAL at 17:59

## 2019-09-16 RX ADMIN — Medication 325 MILLIGRAM(S): at 07:49

## 2019-09-16 RX ADMIN — HYDROMORPHONE HYDROCHLORIDE 4 MILLIGRAM(S): 2 INJECTION INTRAMUSCULAR; INTRAVENOUS; SUBCUTANEOUS at 07:00

## 2019-09-16 RX ADMIN — MORPHINE SULFATE 15 MILLIGRAM(S): 50 CAPSULE, EXTENDED RELEASE ORAL at 14:15

## 2019-09-16 RX ADMIN — Medication 975 MILLIGRAM(S): at 14:15

## 2019-09-16 RX ADMIN — Medication 975 MILLIGRAM(S): at 13:38

## 2019-09-16 RX ADMIN — MORPHINE SULFATE 2 MILLIGRAM(S): 50 CAPSULE, EXTENDED RELEASE ORAL at 00:13

## 2019-09-16 RX ADMIN — ALBUTEROL 2 PUFF(S): 90 AEROSOL, METERED ORAL at 11:14

## 2019-09-16 RX ADMIN — MORPHINE SULFATE 2 MILLIGRAM(S): 50 CAPSULE, EXTENDED RELEASE ORAL at 20:04

## 2019-09-16 RX ADMIN — MORPHINE SULFATE 2 MILLIGRAM(S): 50 CAPSULE, EXTENDED RELEASE ORAL at 23:53

## 2019-09-16 RX ADMIN — HYDROMORPHONE HYDROCHLORIDE 4 MILLIGRAM(S): 2 INJECTION INTRAMUSCULAR; INTRAVENOUS; SUBCUTANEOUS at 03:01

## 2019-09-16 RX ADMIN — HYDROMORPHONE HYDROCHLORIDE 4 MILLIGRAM(S): 2 INJECTION INTRAMUSCULAR; INTRAVENOUS; SUBCUTANEOUS at 10:00

## 2019-09-16 RX ADMIN — Medication 60 MILLIGRAM(S): at 06:29

## 2019-09-16 RX ADMIN — Medication 100 MILLIGRAM(S): at 13:38

## 2019-09-16 RX ADMIN — AMLODIPINE BESYLATE 10 MILLIGRAM(S): 2.5 TABLET ORAL at 06:29

## 2019-09-16 RX ADMIN — Medication 0.1 MILLIGRAM(S): at 18:00

## 2019-09-16 RX ADMIN — HYDROMORPHONE HYDROCHLORIDE 4 MILLIGRAM(S): 2 INJECTION INTRAMUSCULAR; INTRAVENOUS; SUBCUTANEOUS at 18:30

## 2019-09-16 RX ADMIN — HYDROMORPHONE HYDROCHLORIDE 4 MILLIGRAM(S): 2 INJECTION INTRAMUSCULAR; INTRAVENOUS; SUBCUTANEOUS at 06:31

## 2019-09-16 RX ADMIN — CELECOXIB 200 MILLIGRAM(S): 200 CAPSULE ORAL at 07:00

## 2019-09-16 RX ADMIN — MORPHINE SULFATE 15 MILLIGRAM(S): 50 CAPSULE, EXTENDED RELEASE ORAL at 07:00

## 2019-09-16 RX ADMIN — MORPHINE SULFATE 2 MILLIGRAM(S): 50 CAPSULE, EXTENDED RELEASE ORAL at 04:02

## 2019-09-16 RX ADMIN — TIOTROPIUM BROMIDE 1 CAPSULE(S): 18 CAPSULE ORAL; RESPIRATORY (INHALATION) at 11:15

## 2019-09-16 RX ADMIN — MORPHINE SULFATE 15 MILLIGRAM(S): 50 CAPSULE, EXTENDED RELEASE ORAL at 21:52

## 2019-09-16 RX ADMIN — MORPHINE SULFATE 2 MILLIGRAM(S): 50 CAPSULE, EXTENDED RELEASE ORAL at 15:47

## 2019-09-16 RX ADMIN — MORPHINE SULFATE 2 MILLIGRAM(S): 50 CAPSULE, EXTENDED RELEASE ORAL at 08:00

## 2019-09-16 RX ADMIN — MORPHINE SULFATE 2 MILLIGRAM(S): 50 CAPSULE, EXTENDED RELEASE ORAL at 11:30

## 2019-09-16 RX ADMIN — HYDROMORPHONE HYDROCHLORIDE 4 MILLIGRAM(S): 2 INJECTION INTRAMUSCULAR; INTRAVENOUS; SUBCUTANEOUS at 02:46

## 2019-09-16 RX ADMIN — Medication 975 MILLIGRAM(S): at 06:29

## 2019-09-16 RX ADMIN — Medication 325 MILLIGRAM(S): at 21:47

## 2019-09-16 RX ADMIN — MORPHINE SULFATE 2 MILLIGRAM(S): 50 CAPSULE, EXTENDED RELEASE ORAL at 00:28

## 2019-09-16 RX ADMIN — PANTOPRAZOLE SODIUM 40 MILLIGRAM(S): 20 TABLET, DELAYED RELEASE ORAL at 06:29

## 2019-09-16 RX ADMIN — MORPHINE SULFATE 2 MILLIGRAM(S): 50 CAPSULE, EXTENDED RELEASE ORAL at 20:30

## 2019-09-16 RX ADMIN — Medication 5 MILLIGRAM(S): at 11:12

## 2019-09-16 RX ADMIN — HYDROMORPHONE HYDROCHLORIDE 4 MILLIGRAM(S): 2 INJECTION INTRAMUSCULAR; INTRAVENOUS; SUBCUTANEOUS at 09:31

## 2019-09-16 RX ADMIN — CELECOXIB 200 MILLIGRAM(S): 200 CAPSULE ORAL at 06:29

## 2019-09-16 RX ADMIN — MORPHINE SULFATE 2 MILLIGRAM(S): 50 CAPSULE, EXTENDED RELEASE ORAL at 07:42

## 2019-09-16 RX ADMIN — HYDROMORPHONE HYDROCHLORIDE 4 MILLIGRAM(S): 2 INJECTION INTRAMUSCULAR; INTRAVENOUS; SUBCUTANEOUS at 22:24

## 2019-09-16 RX ADMIN — TRAMADOL HYDROCHLORIDE 50 MILLIGRAM(S): 50 TABLET ORAL at 23:02

## 2019-09-16 RX ADMIN — MORPHINE SULFATE 15 MILLIGRAM(S): 50 CAPSULE, EXTENDED RELEASE ORAL at 22:24

## 2019-09-16 RX ADMIN — ALBUTEROL 2 PUFF(S): 90 AEROSOL, METERED ORAL at 06:30

## 2019-09-16 RX ADMIN — Medication 325 MILLIGRAM(S): at 11:13

## 2019-09-16 RX ADMIN — HYDROMORPHONE HYDROCHLORIDE 4 MILLIGRAM(S): 2 INJECTION INTRAMUSCULAR; INTRAVENOUS; SUBCUTANEOUS at 15:48

## 2019-09-16 RX ADMIN — HYDROMORPHONE HYDROCHLORIDE 4 MILLIGRAM(S): 2 INJECTION INTRAMUSCULAR; INTRAVENOUS; SUBCUTANEOUS at 21:52

## 2019-09-16 RX ADMIN — Medication 15 MILLIGRAM(S): at 13:05

## 2019-09-16 RX ADMIN — Medication 15 MILLIGRAM(S): at 12:56

## 2019-09-16 RX ADMIN — MORPHINE SULFATE 15 MILLIGRAM(S): 50 CAPSULE, EXTENDED RELEASE ORAL at 06:30

## 2019-09-16 RX ADMIN — MORPHINE SULFATE 2 MILLIGRAM(S): 50 CAPSULE, EXTENDED RELEASE ORAL at 16:13

## 2019-09-16 RX ADMIN — CELECOXIB 200 MILLIGRAM(S): 200 CAPSULE ORAL at 18:30

## 2019-09-16 RX ADMIN — TRAMADOL HYDROCHLORIDE 50 MILLIGRAM(S): 50 TABLET ORAL at 23:32

## 2019-09-16 RX ADMIN — POLYETHYLENE GLYCOL 3350 17 GRAM(S): 17 POWDER, FOR SOLUTION ORAL at 15:51

## 2019-09-16 RX ADMIN — CELECOXIB 200 MILLIGRAM(S): 200 CAPSULE ORAL at 17:58

## 2019-09-16 RX ADMIN — Medication 100 MILLIGRAM(S): at 06:30

## 2019-09-16 RX ADMIN — Medication 25 MILLIGRAM(S): at 06:29

## 2019-09-16 RX ADMIN — LISINOPRIL 10 MILLIGRAM(S): 2.5 TABLET ORAL at 06:29

## 2019-09-16 RX ADMIN — Medication 1 PATCH: at 19:30

## 2019-09-16 RX ADMIN — Medication 25 MILLIGRAM(S): at 18:00

## 2019-09-16 RX ADMIN — Medication 100 MILLIGRAM(S): at 21:47

## 2019-09-16 RX ADMIN — Medication 0.1 MILLIGRAM(S): at 06:29

## 2019-09-16 RX ADMIN — Medication 5 MILLIGRAM(S): at 23:10

## 2019-09-16 RX ADMIN — Medication 1 PATCH: at 11:13

## 2019-09-16 RX ADMIN — Medication 975 MILLIGRAM(S): at 07:00

## 2019-09-16 RX ADMIN — HYDROMORPHONE HYDROCHLORIDE 4 MILLIGRAM(S): 2 INJECTION INTRAMUSCULAR; INTRAVENOUS; SUBCUTANEOUS at 17:58

## 2019-09-16 RX ADMIN — Medication 1 TABLET(S): at 11:13

## 2019-09-16 RX ADMIN — HYDROMORPHONE HYDROCHLORIDE 4 MILLIGRAM(S): 2 INJECTION INTRAMUSCULAR; INTRAVENOUS; SUBCUTANEOUS at 15:08

## 2019-09-16 NOTE — DISCHARGE NOTE PROVIDER - HOSPITAL COURSE
History of Present Illness:    Reason for Admission: right hip pain    History of Present Illness:     73F s/p stage 1 revision R USAMA w/ Dr. Be 4/23/19 sent to ED for admission for stage 2 revision procedure on 9/14. Reports continued R hip pain. Off iv abx. Negative aspiration cx in office. No recent trauma or falls. Saw Dr. Be in the office today. No fevers or chills. Patient denies radiation of pain. Patient denies numbness/tingling/burning in the RLE. Has been ambulatory with a 4 point cane. No other complaints at this time. Saw her outpatient cardiology Dr. Tera Romero last week. Had stress test 3 days ago. Recent echo as well.              Review of Systems:    Other Review of Systems: All other review of systems negative, except as noted in HPI            Allergies and Intolerances:          Allergies:    	No Known Allergies:         Home Medications:     * Patient Currently Takes Medications as of 02-May-2019 13:12 documented in Structured Notes    · 	pantoprazole 40 mg oral delayed release tablet: 1 tab(s) orally once a day (before a meal)    · 	nicotine 14 mg/24 hr transdermal film, extended release: 1 patch transdermal once a day, As Needed    · 	calcium-vitamin D 500 mg-200 intl units oral tablet: 1 tab(s) orally 3 times a day    · 	Multiple Vitamins oral tablet: 1 tab(s) orally once a day    · 	folic acid 1 mg oral tablet: 1 tab(s) orally once a day    · 	polyethylene glycol 3350 oral powder for reconstitution: 17 gram(s) orally once a day    · 	docusate sodium 100 mg oral capsule: 1 cap(s) orally 3 times a day    · 	senna oral tablet: 2 tab(s) orally once a day (at bedtime), As needed, Constipation    · 	Ferrousal 325 mg oral tablet: 1 tab(s) orally 3 times a day    · 	vancomycin 750 mg intravenous injection: 750 milligram(s) intravenous every 12 hours x 6 weeks total (6/4/19).    · 	ipratropium-albuterol 0.5 mg-2.5 mg/3 mLinhalation solution: 3 milliliter(s) inhaled every 6 hours, As needed, Shortness of Breath and/or Wheezing    · 	labetalol 100 mg oral tablet: 1 tab(s) orally 3 times a day    · 	melatonin 3 mg oral tablet: 1 tab(s) orally once a day (at bedtime), As needed, Insomnia    · 	diazePAM 5 mg oral tablet: 1 tab(s) orally every 8 hours, As needed, anxiety    · 	aspirin 325 mg oral delayed release tablet: 1 tab(s) orally 2 times a day x 6 weeks for DVT prophylaxis    · 	cloNIDine 0.1 mg oral tablet: 1 tab(s) orally 2 times a day    · 	traMADol 50 mg oral tablet: 1 tab(s) orally every 8 hours    · 	morphine 15 mg/8 to 12 hr oral tablet, extended release: 1 tab(s) orally every 8 hours    · 	HYDROmorphone 2 mg oral tablet: 3 tab(s) orally every 4 hours, As needed, Severe Pain (7 - 10)    · 	HYDROmorphone 4 mg oral tablet: 1 tab(s) orally every 4 hours, As needed, Moderate Pain (4 - 6)    · 	acetaminophen 325 mg oral tablet: 3 tab(s) orally every 8 hours, As Needed for mild pain, temp >100.4F    · 	lisinopril 10 mg oral tablet: 1 tab(s) orally once a day    · 	amLODIPine 10 mg oral tablet: 1 tab(s) orally once a day        .        Patient History:      Past Medical, Past Surgical, and Family History:    PAST MEDICAL HISTORY:    Alcohol abuse     Anxiety     CAD (coronary artery disease)     Coronary artery disease     Emphysema, unspecified     HTN (hypertension)     Hyperlipidemia     Hypertension     Migraine     Migraine     Pain of right hip joint     Seizure     Stented coronary artery.         PAST SURGICAL HISTORY:    S/P bladder repair     Status post total hip replacement, right 5/21/18. History of Present Illness:    Reason for Admission: right hip pain    History of Present Illness:     73F s/p stage 1 revision R USAMA w/ Dr. Be 4/23/19 sent to ED for admission for stage 2 revision procedure on 9/14. Reports continued R hip pain. Off iv abx. Negative aspiration cx in office. No recent trauma or falls. Saw Dr. Be in the office today. No fevers or chills. Patient denies radiation of pain. Patient denies numbness/tingling/burning in the RLE. Has been ambulatory with a 4 point cane. No other complaints at this time. Saw her outpatient cardiology Dr. Tera Romero last week. Had stress test 3 days ago. Recent echo as well.              Review of Systems:    Other Review of Systems: All other review of systems negative, except as noted in HPI            Allergies and Intolerances:          Allergies:    	No Known Allergies:         Home Medications:     * Patient Currently Takes Medications as of 02-May-2019 13:12 documented in Structured Notes    · 	pantoprazole 40 mg oral delayed release tablet: 1 tab(s) orally once a day (before a meal)    · 	nicotine 14 mg/24 hr transdermal film, extended release: 1 patch transdermal once a day, As Needed    · 	calcium-vitamin D 500 mg-200 intl units oral tablet: 1 tab(s) orally 3 times a day    · 	Multiple Vitamins oral tablet: 1 tab(s) orally once a day    · 	folic acid 1 mg oral tablet: 1 tab(s) orally once a day    · 	polyethylene glycol 3350 oral powder for reconstitution: 17 gram(s) orally once a day    · 	docusate sodium 100 mg oral capsule: 1 cap(s) orally 3 times a day    · 	senna oral tablet: 2 tab(s) orally once a day (at bedtime), As needed, Constipation    · 	Ferrousal 325 mg oral tablet: 1 tab(s) orally 3 times a day    · 	vancomycin 750 mg intravenous injection: 750 milligram(s) intravenous every 12 hours x 6 weeks total (6/4/19).    · 	ipratropium-albuterol 0.5 mg-2.5 mg/3 mLinhalation solution: 3 milliliter(s) inhaled every 6 hours, As needed, Shortness of Breath and/or Wheezing    · 	labetalol 100 mg oral tablet: 1 tab(s) orally 3 times a day    · 	melatonin 3 mg oral tablet: 1 tab(s) orally once a day (at bedtime), As needed, Insomnia    · 	diazePAM 5 mg oral tablet: 1 tab(s) orally every 8 hours, As needed, anxiety    · 	aspirin 325 mg oral delayed release tablet: 1 tab(s) orally 2 times a day x 6 weeks for DVT prophylaxis    · 	cloNIDine 0.1 mg oral tablet: 1 tab(s) orally 2 times a day    · 	traMADol 50 mg oral tablet: 1 tab(s) orally every 8 hours    · 	morphine 15 mg/8 to 12 hr oral tablet, extended release: 1 tab(s) orally every 8 hours    · 	HYDROmorphone 2 mg oral tablet: 3 tab(s) orally every 4 hours, As needed, Severe Pain (7 - 10)    · 	HYDROmorphone 4 mg oral tablet: 1 tab(s) orally every 4 hours, As needed, Moderate Pain (4 - 6)    · 	acetaminophen 325 mg oral tablet: 3 tab(s) orally every 8 hours, As Needed for mild pain, temp >100.4F    · 	lisinopril 10 mg oral tablet: 1 tab(s) orally once a day    · 	amLODIPine 10 mg oral tablet: 1 tab(s) orally once a day        .        Patient History:      Past Medical, Past Surgical, and Family History:    PAST MEDICAL HISTORY:    Alcohol abuse     Anxiety     CAD (coronary artery disease)     Coronary artery disease     Emphysema, unspecified     HTN (hypertension)     Hyperlipidemia     Hypertension     Migraine     Migraine     Pain of right hip joint     Seizure     Stented coronary artery.         PAST SURGICAL HISTORY:    S/P bladder repair     Status post total hip replacement, right 5/21/18.             Patient underwent Stage 2 Removal of antibiotic spacer with posterior hip replacement with Dr. Be. Patient tolerated procedure well. OR cultures were drawn and currently have no growth found.     Patient evaluated by physical therapy team whom recommended home with home physical therapy for discharge.     Patient evaluated by infectious disease team whom recommended Doxycycline 100 mg every 12 hours for antibiotic coverage. Infectious disease team recommending patient to take Doxycyline twice daily for 2 months, with a 1 month follow up appointment with Dr. So.     Remainder of hospital stay unremarkable. Patient stable and ready for discharge.

## 2019-09-16 NOTE — DISCHARGE NOTE PROVIDER - NSDCACTIVITY_GEN_ALL_CORE
Walking - Outdoors allowed/Walking - Indoors allowed/Do not make important decisions/Stairs allowed/No heavy lifting/straining/Do not drive or operate machinery Stairs allowed/Walking - Indoors allowed/No heavy lifting/straining/Do not make important decisions/Walking - Outdoors allowed/Do not drive or operate machinery/Showering allowed

## 2019-09-16 NOTE — DISCHARGE NOTE PROVIDER - NSDCCPTREATMENT_GEN_ALL_CORE_FT
PRINCIPAL PROCEDURE  Procedure: Revision, total arthroplasty, hip, with removal of spacer  Findings and Treatment: Right

## 2019-09-16 NOTE — PROGRESS NOTE ADULT - ASSESSMENT
A/p: 73y Female s/p R Total Hip Arthroplasty stage 2 Revision 2/2 Prosthetic Joint Infection.  POD2    PT/OT-30% PWB, Abd pillow while in bed.  Continue with IV Abx  F/U AM Labs  F/U OR Cx  IS  DVT PPx: Aspirin 325mg Q12H PO, SCDs  Pain Control  Continue Current Tx. A/p: 73y Female s/p R Total Hip Arthroplasty stage 2 Revision 2/2 Prosthetic Joint Infection.  POD2    PT/OT-30% PWB, Abd pillow while in bed.  Continue with IV Abx  edmondson for retention  F/U OR Cx  IS  DVT PPx: Aspirin 325mg Q12H PO, SCDs  Pain Control  Continue Current Tx.

## 2019-09-16 NOTE — DISCHARGE NOTE PROVIDER - NSDCFUADDINST_GEN_ALL_CORE_FT
Dressing will be changed during office visit. Out of bed, ambulate, weight bearing as tolerated- Physical therapy to assist with exercise and help increase endurance.  Patient may shower, limit direct water to dressing, if wet pat dry Dressing will be changed during office visit. Out of bed, ambulate, weight bearing as tolerated- Physical therapy to assist with exercise and help increase endurance.  Patient may shower, limit direct water to dressing, if wet pat dry.    Continue taking Aspirin 325 mg twice daily to help prevent blood clots.     Please follow up with Dr. So, infectious disease doctor, within 1 month of hospital discharge.   Continue to take Doxycyline 100 mg twice daily for 2 months for antibiotic coverage.

## 2019-09-16 NOTE — DISCHARGE NOTE PROVIDER - CARE PROVIDERS DIRECT ADDRESSES
,[email or web address],DirectAddress_Unknown,DirectAddress_Unknown ,[email or web address],DirectAddress_Unknown,DirectAddress_Unknown,[email or web address]

## 2019-09-16 NOTE — PROGRESS NOTE ADULT - PROBLEM SELECTOR PLAN 1
Patient post op   Patient seem pain med seeking  use pain meds with caution  has been participating with physical therapy

## 2019-09-16 NOTE — DISCHARGE NOTE PROVIDER - PROVIDER TOKENS
PROVIDER:[TOKEN:[[id number]]],PROVIDER:[TOKEN:[[id number]]],PROVIDER:[TOKEN:[[id number]]] PROVIDER:[TOKEN:[[id number]]],PROVIDER:[TOKEN:[[id number]]],PROVIDER:[TOKEN:[[id number]]],PROVIDER:[TOKEN:[[id number]],FOLLOWUP:[1 month]]

## 2019-09-16 NOTE — DISCHARGE NOTE PROVIDER - CARE PROVIDER_API CALL
Megan Be)  Orthopaedic Surgery  [address], [address]  [place] [address]  Phone: [phone]  Fax: [phone]  Follow Up Time:     Domenico Cabrera)  Cardiovascular Disease; Internal Medicine  [address], [address]  [place] [address]  Phone: [phone]  Fax: [phone]  Follow Up Time:     Artemio Quinones)  Geriatric Medicine; Internal Medicine  [address], [place] [address]  Phone: [phone]  Fax: [phone]  Follow Up Time: Megan Be)  Orthopaedic Surgery  [address], [address]  [place] [address]  Phone: [phone]  Fax: [phone]  Follow Up Time:     Domenico Cabrera)  Cardiovascular Disease; Internal Medicine  [address] [facility] [address]  [place] [address]  Phone: [phone]  Fax: [phone]  Follow Up Time:     Artemio Quinones)  Geriatric Medicine; Internal Medicine  [address], [place] [id number]  Phone: [phone]  Fax: [phone]  Follow Up Time:     Christopher So)  Infectious Disease; Internal Medicine  [address] [place] [address]  Phone: [phone]  Fax: [phone]  Follow Up Time: 1 month

## 2019-09-16 NOTE — DISCHARGE NOTE PROVIDER - NSDCCPCAREPLAN_GEN_ALL_CORE_FT
PRINCIPAL DISCHARGE DIAGNOSIS  Diagnosis: Hip pain, chronic, right  Assessment and Plan of Treatment: PRINCIPAL DISCHARGE DIAGNOSIS  Diagnosis: S/P revision of total hip  Assessment and Plan of Treatment:

## 2019-09-16 NOTE — PROGRESS NOTE ADULT - SUBJECTIVE AND OBJECTIVE BOX
73F s/p stage 1 revision R USAMA w/ Dr. Be 4/23/19 sent to ED for admission for stage 2 revision procedure on 9/14. Reports continued R hip pain. Off iv abx. Negative aspiration cx in office. No recent trauma or falls. Saw Dr. Be in the office. No fevers or chills. Patient denies radiation of pain. Patient denies numbness/tingling/burning in the RLE. Has been ambulatory with a 4 point cane. No other complaints at this time.  Patient had a normal echo and stress test in the  past week. Patient s/p revision of hip surgery. Resting comfortably. started working with physical therapy     MEDICATIONS  (STANDING):  acetaminophen   Tablet .. 975 milliGRAM(s) Oral every 8 hours  ALBUTerol    90 MICROgram(s) HFA Inhaler 2 Puff(s) Inhalation every 6 hours  amLODIPine   Tablet 10 milliGRAM(s) Oral daily  aspirin enteric coated 325 milliGRAM(s) Oral every 12 hours  celecoxib 200 milliGRAM(s) Oral every 12 hours  cloNIDine 0.1 milliGRAM(s) Oral two times a day  docusate sodium 100 milliGRAM(s) Oral three times a day  ferrous    sulfate 325 milliGRAM(s) Oral daily  lisinopril 10 milliGRAM(s) Oral daily  metoprolol tartrate 25 milliGRAM(s) Oral two times a day  morphine ER Tablet 15 milliGRAM(s) Oral every 8 hours  multivitamin 1 Tablet(s) Oral daily  nicotine - 21 mG/24Hr(s) Patch 1 patch Transdermal daily  NIFEdipine XL 60 milliGRAM(s) Oral daily  pantoprazole    Tablet 40 milliGRAM(s) Oral before breakfast  senna 2 Tablet(s) Oral at bedtime  tiotropium 18 MICROgram(s) Capsule 1 Capsule(s) Inhalation daily  traMADol 50 milliGRAM(s) Oral every 6 hours  vancomycin  IVPB 500 milliGRAM(s) IV Intermittent every 24 hours    MEDICATIONS  (PRN):  bisacodyl Suppository 10 milliGRAM(s) Rectal daily PRN If no bowel movement by POD#2  diazepam    Tablet 5 milliGRAM(s) Oral every 8 hours PRN anxiety  HYDROmorphone   Tablet 2 milliGRAM(s) Oral every 3 hours PRN Moderate Pain (4 - 6)  HYDROmorphone   Tablet 4 milliGRAM(s) Oral every 3 hours PRN Severe Pain (7 - 10)  magnesium hydroxide Suspension 30 milliLiter(s) Oral daily PRN Constipation  melatonin 3 milliGRAM(s) Oral at bedtime PRN Insomnia  metoclopramide Injectable 5 milliGRAM(s) IV Push every 6 hours PRN Nausea and/or Vomiting  morphine  - Injectable 2 milliGRAM(s) IV Push every 2 hours PRN Severe Pain (7 - 10)  polyethylene glycol 3350 17 Gram(s) Oral two times a day PRN Constipation          VITALS:   T(C): 36.9 (09-16-19 @ 16:31), Max: 36.9 (09-16-19 @ 14:48)  HR: 68 (09-16-19 @ 18:00) (60 - 73)  BP: 135/64 (09-16-19 @ 18:00) (113/71 - 135/64)  RR: 18 (09-16-19 @ 16:31) (18 - 18)  SpO2: 96% (09-16-19 @ 16:31) (93% - 96%)  Wt(kg): --    PHYSICAL EXAM:  GENERAL: NAD, well nourished and conversant  HEAD:  Atraumatic  EYES: EOM, PERRLA, conjunctiva pink and sclera white  ENT: No tonsillar erythema, exudates, or enlargement, moist mucous membranes, good dentition, no lesions  NECK: Supple, No JVD, normal thyroid, carotids with normal upstrokes and no bruits  CHEST/LUNG: Clear to auscultation bilaterally, No rales, rhonchi, wheezing, or rubs  HEART: Regular rate and rhythm, No murmurs, rubs, or gallops  ABDOMEN: Soft, nondistended, no masses, guarding, tenderness or rebound, bowel sounds present  EXTREMITIES:  2+ Peripheral Pulses, No clubbing, cyanosis, or edema.   LYMPH: No lymphadenopathy noted  SKIN: No rashes or lesions  NERVOUS SYSTEM:  Alert & Oriented X3, normal cognitive function. Motor Strength 5/5     LABS:        CBC Full  -  ( 15 Sep 2019 08:57 )  WBC Count : 8.83 K/uL  RBC Count : 4.03 M/uL  Hemoglobin : 11.3 g/dL  Hematocrit : 38.4 %  Platelet Count - Automated : 212 K/uL  Mean Cell Volume : 95.3 fl  Mean Cell Hemoglobin : 28.0 pg  Mean Cell Hemoglobin Concentration : 29.4 gm/dL  Auto Neutrophil # : x  Auto Lymphocyte # : x  Auto Monocyte # : x  Auto Eosinophil # : x  Auto Basophil # : x  Auto Neutrophil % : x  Auto Lymphocyte % : x  Auto Monocyte % : x  Auto Eosinophil % : x  Auto Basophil % : x    09-15    136  |  98  |  28<H>  ----------------------------<  135<H>  5.2   |  23  |  0.72    Ca    8.1<L>      15 Sep 2019 06:24            CAPILLARY BLOOD GLUCOSE          RADIOLOGY & ADDITIONAL TESTS:

## 2019-09-16 NOTE — PROGRESS NOTE ADULT - SUBJECTIVE AND OBJECTIVE BOX
Patient seen bedside.  No chest pain, SOB, N/V.    Vital Signs Last 24 Hrs  T(C): 36.7 (09-16-19 @ 05:30), Max: 36.7 (09-15-19 @ 08:18)  T(F): 98.1 (09-16-19 @ 05:30), Max: 98.1 (09-15-19 @ 21:24)  HR: 63 (09-16-19 @ 05:30) (57 - 73)  BP: 131/58 (09-16-19 @ 05:30) (113/71 - 131/58)  BP(mean): --  RR: 18 (09-16-19 @ 05:30) (18 - 18)  SpO2: 96% (09-16-19 @ 05:30) (89% - 96%)      Exam:  Alert and Oriented, No Acute Distress    Lower Extremities: R Hip  Dressing: C/D/I  Calves Soft, Non-tender bilaterally  +PF/DF/EHL/FHL  SILT  +DP Pulse

## 2019-09-16 NOTE — PROGRESS NOTE ADULT - SUBJECTIVE AND OBJECTIVE BOX
Subjective: Patient seen and examined. No new events except as noted.     SUBJECTIVE/ROS:  resting       MEDICATIONS:  MEDICATIONS  (STANDING):  acetaminophen   Tablet .. 975 milliGRAM(s) Oral every 8 hours  ALBUTerol    90 MICROgram(s) HFA Inhaler 2 Puff(s) Inhalation every 6 hours  amLODIPine   Tablet 10 milliGRAM(s) Oral daily  aspirin enteric coated 325 milliGRAM(s) Oral every 12 hours  celecoxib 200 milliGRAM(s) Oral every 12 hours  cloNIDine 0.1 milliGRAM(s) Oral two times a day  docusate sodium 100 milliGRAM(s) Oral three times a day  ferrous    sulfate 325 milliGRAM(s) Oral daily  ketorolac   Injectable 15 milliGRAM(s) IV Push every 8 hours  lactated ringers. 1000 milliLiter(s) (80 mL/Hr) IV Continuous <Continuous>  lisinopril 10 milliGRAM(s) Oral daily  metoprolol tartrate 25 milliGRAM(s) Oral two times a day  morphine ER Tablet 15 milliGRAM(s) Oral every 8 hours  multivitamin 1 Tablet(s) Oral daily  nicotine - 21 mG/24Hr(s) Patch 1 patch Transdermal daily  NIFEdipine XL 60 milliGRAM(s) Oral daily  pantoprazole    Tablet 40 milliGRAM(s) Oral before breakfast  senna 2 Tablet(s) Oral at bedtime  tiotropium 18 MICROgram(s) Capsule 1 Capsule(s) Inhalation daily  traMADol 50 milliGRAM(s) Oral every 6 hours  vancomycin  IVPB 500 milliGRAM(s) IV Intermittent every 24 hours      PHYSICAL EXAM:  T(C): 36.7 (09-16-19 @ 05:30), Max: 36.7 (09-15-19 @ 08:18)  HR: 63 (09-16-19 @ 05:30) (57 - 73)  BP: 131/58 (09-16-19 @ 05:30) (113/71 - 131/58)  RR: 18 (09-16-19 @ 05:30) (18 - 18)  SpO2: 96% (09-16-19 @ 05:30) (89% - 96%)  Wt(kg): --  I&O's Summary    15 Sep 2019 07:01  -  16 Sep 2019 07:00  --------------------------------------------------------  IN: 1040 mL / OUT: 2950 mL / NET: -1910 mL            JVP: Normal  Neck: supple  Lung: clear   CV: S1 S2 , Murmur:  Abd: soft  Ext: No edema  neuro: Awake / alert  Psych: flat affect  Skin: normal``    LABS/DATA:    CARDIAC MARKERS:                                11.3   8.83  )-----------( 212      ( 15 Sep 2019 08:57 )             38.4     09-15    136  |  98  |  28<H>  ----------------------------<  135<H>  5.2   |  23  |  0.72    Ca    8.1<L>      15 Sep 2019 06:24      proBNP:   Lipid Profile:   HgA1c:   TSH:     TELE:  EKG:

## 2019-09-16 NOTE — PROGRESS NOTE ADULT - ASSESSMENT
HTN  labile  cont current meds for now     CAD history of stent  cont asa    AS  Moderate   hemodynamically stable

## 2019-09-16 NOTE — DISCHARGE NOTE PROVIDER - NSDCFUADDAPPT_GEN_ALL_CORE_FT
Please call Dr. Be"s office within next few days to schedule a follow up appointment about 14 days after surgery.   Recommend follow up with medical MD, within next 4 weeks. Please call Dr. Be's office within next few days to schedule a follow up appointment about 14 days after surgery.   Recommend follow up with medical MD, within next 4 weeks. Please call Dr. Be's office within next few days to schedule a follow up appointment about 14 days after surgery.     Must f/u with Dr So (infectious disease) within 1 month.  Recommend follow up with medical MD, within next 4 weeks.

## 2019-09-17 RX ADMIN — Medication 325 MILLIGRAM(S): at 07:51

## 2019-09-17 RX ADMIN — MORPHINE SULFATE 2 MILLIGRAM(S): 50 CAPSULE, EXTENDED RELEASE ORAL at 20:43

## 2019-09-17 RX ADMIN — Medication 60 MILLIGRAM(S): at 06:08

## 2019-09-17 RX ADMIN — MORPHINE SULFATE 15 MILLIGRAM(S): 50 CAPSULE, EXTENDED RELEASE ORAL at 06:40

## 2019-09-17 RX ADMIN — Medication 1 TABLET(S): at 11:51

## 2019-09-17 RX ADMIN — MORPHINE SULFATE 2 MILLIGRAM(S): 50 CAPSULE, EXTENDED RELEASE ORAL at 20:12

## 2019-09-17 RX ADMIN — LISINOPRIL 10 MILLIGRAM(S): 2.5 TABLET ORAL at 06:08

## 2019-09-17 RX ADMIN — ALBUTEROL 2 PUFF(S): 90 AEROSOL, METERED ORAL at 17:06

## 2019-09-17 RX ADMIN — MORPHINE SULFATE 15 MILLIGRAM(S): 50 CAPSULE, EXTENDED RELEASE ORAL at 21:50

## 2019-09-17 RX ADMIN — TIOTROPIUM BROMIDE 1 CAPSULE(S): 18 CAPSULE ORAL; RESPIRATORY (INHALATION) at 11:51

## 2019-09-17 RX ADMIN — CELECOXIB 200 MILLIGRAM(S): 200 CAPSULE ORAL at 17:35

## 2019-09-17 RX ADMIN — MORPHINE SULFATE 2 MILLIGRAM(S): 50 CAPSULE, EXTENDED RELEASE ORAL at 00:23

## 2019-09-17 RX ADMIN — MORPHINE SULFATE 15 MILLIGRAM(S): 50 CAPSULE, EXTENDED RELEASE ORAL at 13:28

## 2019-09-17 RX ADMIN — ALBUTEROL 2 PUFF(S): 90 AEROSOL, METERED ORAL at 11:51

## 2019-09-17 RX ADMIN — Medication 0.1 MILLIGRAM(S): at 06:10

## 2019-09-17 RX ADMIN — Medication 975 MILLIGRAM(S): at 13:28

## 2019-09-17 RX ADMIN — Medication 25 MILLIGRAM(S): at 06:09

## 2019-09-17 RX ADMIN — Medication 1 PATCH: at 17:02

## 2019-09-17 RX ADMIN — TRAMADOL HYDROCHLORIDE 50 MILLIGRAM(S): 50 TABLET ORAL at 17:35

## 2019-09-17 RX ADMIN — POLYETHYLENE GLYCOL 3350 17 GRAM(S): 17 POWDER, FOR SOLUTION ORAL at 11:52

## 2019-09-17 RX ADMIN — MORPHINE SULFATE 15 MILLIGRAM(S): 50 CAPSULE, EXTENDED RELEASE ORAL at 21:20

## 2019-09-17 RX ADMIN — Medication 0.1 MILLIGRAM(S): at 17:05

## 2019-09-17 RX ADMIN — Medication 5 MILLIGRAM(S): at 14:21

## 2019-09-17 RX ADMIN — HYDROMORPHONE HYDROCHLORIDE 4 MILLIGRAM(S): 2 INJECTION INTRAMUSCULAR; INTRAVENOUS; SUBCUTANEOUS at 04:30

## 2019-09-17 RX ADMIN — TRAMADOL HYDROCHLORIDE 50 MILLIGRAM(S): 50 TABLET ORAL at 17:05

## 2019-09-17 RX ADMIN — Medication 100 MILLIGRAM(S): at 06:09

## 2019-09-17 RX ADMIN — Medication 325 MILLIGRAM(S): at 11:51

## 2019-09-17 RX ADMIN — TRAMADOL HYDROCHLORIDE 50 MILLIGRAM(S): 50 TABLET ORAL at 12:20

## 2019-09-17 RX ADMIN — CELECOXIB 200 MILLIGRAM(S): 200 CAPSULE ORAL at 06:40

## 2019-09-17 RX ADMIN — MORPHINE SULFATE 2 MILLIGRAM(S): 50 CAPSULE, EXTENDED RELEASE ORAL at 06:40

## 2019-09-17 RX ADMIN — MORPHINE SULFATE 2 MILLIGRAM(S): 50 CAPSULE, EXTENDED RELEASE ORAL at 09:41

## 2019-09-17 RX ADMIN — MORPHINE SULFATE 2 MILLIGRAM(S): 50 CAPSULE, EXTENDED RELEASE ORAL at 06:10

## 2019-09-17 RX ADMIN — Medication 100 MILLIGRAM(S): at 21:19

## 2019-09-17 RX ADMIN — Medication 1 PATCH: at 11:50

## 2019-09-17 RX ADMIN — Medication 325 MILLIGRAM(S): at 21:19

## 2019-09-17 RX ADMIN — HYDROMORPHONE HYDROCHLORIDE 4 MILLIGRAM(S): 2 INJECTION INTRAMUSCULAR; INTRAVENOUS; SUBCUTANEOUS at 07:51

## 2019-09-17 RX ADMIN — HYDROMORPHONE HYDROCHLORIDE 4 MILLIGRAM(S): 2 INJECTION INTRAMUSCULAR; INTRAVENOUS; SUBCUTANEOUS at 18:20

## 2019-09-17 RX ADMIN — HYDROMORPHONE HYDROCHLORIDE 4 MILLIGRAM(S): 2 INJECTION INTRAMUSCULAR; INTRAVENOUS; SUBCUTANEOUS at 03:58

## 2019-09-17 RX ADMIN — CELECOXIB 200 MILLIGRAM(S): 200 CAPSULE ORAL at 06:10

## 2019-09-17 RX ADMIN — MORPHINE SULFATE 15 MILLIGRAM(S): 50 CAPSULE, EXTENDED RELEASE ORAL at 06:10

## 2019-09-17 RX ADMIN — HYDROMORPHONE HYDROCHLORIDE 4 MILLIGRAM(S): 2 INJECTION INTRAMUSCULAR; INTRAVENOUS; SUBCUTANEOUS at 08:20

## 2019-09-17 RX ADMIN — TRAMADOL HYDROCHLORIDE 50 MILLIGRAM(S): 50 TABLET ORAL at 11:50

## 2019-09-17 RX ADMIN — MORPHINE SULFATE 2 MILLIGRAM(S): 50 CAPSULE, EXTENDED RELEASE ORAL at 10:10

## 2019-09-17 RX ADMIN — Medication 25 MILLIGRAM(S): at 17:05

## 2019-09-17 RX ADMIN — MORPHINE SULFATE 15 MILLIGRAM(S): 50 CAPSULE, EXTENDED RELEASE ORAL at 14:00

## 2019-09-17 RX ADMIN — Medication 975 MILLIGRAM(S): at 14:00

## 2019-09-17 RX ADMIN — PANTOPRAZOLE SODIUM 40 MILLIGRAM(S): 20 TABLET, DELAYED RELEASE ORAL at 06:08

## 2019-09-17 RX ADMIN — CELECOXIB 200 MILLIGRAM(S): 200 CAPSULE ORAL at 17:05

## 2019-09-17 RX ADMIN — Medication 100 MILLIGRAM(S): at 13:28

## 2019-09-17 RX ADMIN — HYDROMORPHONE HYDROCHLORIDE 4 MILLIGRAM(S): 2 INJECTION INTRAMUSCULAR; INTRAVENOUS; SUBCUTANEOUS at 17:50

## 2019-09-17 RX ADMIN — MAGNESIUM HYDROXIDE 30 MILLILITER(S): 400 TABLET, CHEWABLE ORAL at 17:13

## 2019-09-17 RX ADMIN — AMLODIPINE BESYLATE 10 MILLIGRAM(S): 2.5 TABLET ORAL at 06:10

## 2019-09-17 NOTE — PROGRESS NOTE ADULT - ASSESSMENT
HTN  labile    CAD history of stent  cont asa  asymptomatic     AS  Moderate   hemodynamically stable

## 2019-09-17 NOTE — ED PROVIDER NOTE - CPE EDP RESP NORM
normal... Non-Graft Cartilage Fenestration Text: The cartilage was fenestrated with a 2mm punch biopsy to help facilitate healing.

## 2019-09-17 NOTE — PROGRESS NOTE ADULT - SUBJECTIVE AND OBJECTIVE BOX
As per RN, patient has voided multiple times since being straight cathed yesterday.  Patient currently w/o complaints. Denies cp, sob, palpitations, fever, chills.     T(C): 36.5 (09-17-19 @ 04:36), Max: 36.9 (09-16-19 @ 14:48)  HR: 65 (09-17-19 @ 06:00) (58 - 68)  BP: 148/71 (09-17-19 @ 06:00) (115/62 - 148/71)  RR: 18 (09-17-19 @ 04:36) (18 - 18)  SpO2: 96% (09-17-19 @ 04:36) (95% - 97%)      PHYSICAL EXAM:  NAD, Alert  Right Hip: Mild edema, compartments soft, Dressing C/D/I; dull sensation grossly intact to light touch; (+) DF/PF; (+) Distal Pulses; No Calf tenderness B/L, PAS     LABS:                        11.3   8.83  )-----------( 212      ( 15 Sep 2019 08:57 )             38.4

## 2019-09-17 NOTE — PROGRESS NOTE ADULT - ASSESSMENT
A/P: 74 y/o f  s/p Stage 2 Revision    DVt ppx- ASA 325mg PO BID  Pain management prn  RLE 30% PWB A/P: 72 y/o F POD#3 s/p R Hip PJI  Stage 2 Revision    DVt ppx- ASA 325mg PO BID  Pain management prn  RLE 30% PWB  Pain management prn  GI ppx  IV Abx- Vancomycin until final OR Cx result-->F/U  Discharge planning to Home      LELAND Lundberg  Orthopedic Surgery  [phone]

## 2019-09-17 NOTE — PROGRESS NOTE ADULT - ATTENDING COMMENTS
DC planning home Beginning to ambulate and doing well. No medical complications post-op to date and to proceed with physical therapy, as tolerated. Continues pulmonary toilet to lessen atelectasis, leg exercises as taught to lessen the risk of DVT and supervised pain medications for post-op pain control. I anticipate transfer to rehabilitation to follow when cleared by orthopedics.

## 2019-09-17 NOTE — PROGRESS NOTE ADULT - SUBJECTIVE AND OBJECTIVE BOX
Subjective: Patient seen and examined. No new events except as noted.     SUBJECTIVE/ROS:        MEDICATIONS:  MEDICATIONS  (STANDING):  acetaminophen   Tablet .. 975 milliGRAM(s) Oral every 8 hours  ALBUTerol    90 MICROgram(s) HFA Inhaler 2 Puff(s) Inhalation every 6 hours  amLODIPine   Tablet 10 milliGRAM(s) Oral daily  aspirin enteric coated 325 milliGRAM(s) Oral every 12 hours  celecoxib 200 milliGRAM(s) Oral every 12 hours  cloNIDine 0.1 milliGRAM(s) Oral two times a day  docusate sodium 100 milliGRAM(s) Oral three times a day  ferrous    sulfate 325 milliGRAM(s) Oral daily  lisinopril 10 milliGRAM(s) Oral daily  metoprolol tartrate 25 milliGRAM(s) Oral two times a day  morphine ER Tablet 15 milliGRAM(s) Oral every 8 hours  multivitamin 1 Tablet(s) Oral daily  nicotine - 21 mG/24Hr(s) Patch 1 patch Transdermal daily  NIFEdipine XL 60 milliGRAM(s) Oral daily  pantoprazole    Tablet 40 milliGRAM(s) Oral before breakfast  senna 2 Tablet(s) Oral at bedtime  tiotropium 18 MICROgram(s) Capsule 1 Capsule(s) Inhalation daily  traMADol 50 milliGRAM(s) Oral every 6 hours  vancomycin  IVPB 500 milliGRAM(s) IV Intermittent every 24 hours      PHYSICAL EXAM:  T(C): 36.6 (09-17-19 @ 08:48), Max: 36.9 (09-16-19 @ 14:48)  HR: 51 (09-17-19 @ 08:48) (51 - 68)  BP: 100/57 (09-17-19 @ 08:48) (100/57 - 148/71)  RR: 18 (09-17-19 @ 08:48) (18 - 18)  SpO2: 96% (09-17-19 @ 08:48) (95% - 97%)  Wt(kg): --  I&O's Summary    16 Sep 2019 07:01  -  17 Sep 2019 07:00  --------------------------------------------------------  IN: 1320 mL / OUT: 1950 mL / NET: -630 mL    17 Sep 2019 07:01  -  17 Sep 2019 09:05  --------------------------------------------------------  IN: 0 mL / OUT: 200 mL / NET: -200 mL            JVP: Normal  Neck: supple  Lung: clear   CV: S1 S2 , Murmur:  Abd: soft  Ext: No edema  neuro: Awake   Psych: flat affect  Skin: normal``    LABS/DATA:    CARDIAC MARKERS:                  proBNP:   Lipid Profile:   HgA1c:   TSH:     TELE:  EKG:

## 2019-09-17 NOTE — PROGRESS NOTE ADULT - SUBJECTIVE AND OBJECTIVE BOX
73F s/p stage 1 revision R SUAMA w/ Dr. Be 4/23/19 sent to ED for admission for stage 2 revision procedure on 9/14. Reports continued R hip pain. Off iv abx. Negative aspiration cx in office. No recent trauma or falls. Saw Dr. Be in the office. No fevers or chills. Patient denies radiation of pain. Patient denies numbness/tingling/burning in the RLE. Has been ambulatory with a 4 point cane. No other complaints at this time.  Patient had a normal echo and stress test in the  past week. Patient s/p revision of hip surgery. Resting comfortably. started working with physical therapy     MEDICATIONS  (STANDING):  acetaminophen   Tablet .. 975 milliGRAM(s) Oral every 8 hours  ALBUTerol    90 MICROgram(s) HFA Inhaler 2 Puff(s) Inhalation every 6 hours  amLODIPine   Tablet 10 milliGRAM(s) Oral daily  aspirin enteric coated 325 milliGRAM(s) Oral every 12 hours  celecoxib 200 milliGRAM(s) Oral every 12 hours  cloNIDine 0.1 milliGRAM(s) Oral two times a day  docusate sodium 100 milliGRAM(s) Oral three times a day  ferrous    sulfate 325 milliGRAM(s) Oral daily  lisinopril 10 milliGRAM(s) Oral daily  metoprolol tartrate 25 milliGRAM(s) Oral two times a day  morphine ER Tablet 15 milliGRAM(s) Oral every 8 hours  multivitamin 1 Tablet(s) Oral daily  nicotine - 21 mG/24Hr(s) Patch 1 patch Transdermal daily  NIFEdipine XL 60 milliGRAM(s) Oral daily  pantoprazole    Tablet 40 milliGRAM(s) Oral before breakfast  senna 2 Tablet(s) Oral at bedtime  tiotropium 18 MICROgram(s) Capsule 1 Capsule(s) Inhalation daily  traMADol 50 milliGRAM(s) Oral every 6 hours  vancomycin  IVPB 500 milliGRAM(s) IV Intermittent every 24 hours    MEDICATIONS  (PRN):  bisacodyl Suppository 10 milliGRAM(s) Rectal daily PRN If no bowel movement by POD#2  diazepam    Tablet 5 milliGRAM(s) Oral every 8 hours PRN anxiety  HYDROmorphone   Tablet 2 milliGRAM(s) Oral every 3 hours PRN Moderate Pain (4 - 6)  HYDROmorphone   Tablet 4 milliGRAM(s) Oral every 3 hours PRN Severe Pain (7 - 10)  magnesium hydroxide Suspension 30 milliLiter(s) Oral daily PRN Constipation  melatonin 3 milliGRAM(s) Oral at bedtime PRN Insomnia  metoclopramide Injectable 5 milliGRAM(s) IV Push every 6 hours PRN Nausea and/or Vomiting  morphine  - Injectable 2 milliGRAM(s) IV Push every 2 hours PRN Severe Pain (7 - 10)  polyethylene glycol 3350 17 Gram(s) Oral two times a day PRN Constipation          Vital Signs Last 24 Hrs  T(C): 36.6 (17 Sep 2019 08:48), Max: 36.9 (16 Sep 2019 14:48)  T(F): 97.9 (17 Sep 2019 08:48), Max: 98.4 (16 Sep 2019 14:48)  HR: 51 (17 Sep 2019 08:48) (51 - 68)  BP: 100/57 (17 Sep 2019 08:48) (100/57 - 148/71)  BP(mean): --  RR: 18 (17 Sep 2019 08:48) (18 - 18)  SpO2: 96% (17 Sep 2019 08:48) (95% - 97%)    PHYSICAL EXAM:  GENERAL: NAD, well nourished and conversant  HEAD:  Atraumatic  EYES: EOM, PERRLA, conjunctiva pink and sclera white  ENT: No tonsillar erythema, exudates, or enlargement, moist mucous membranes, good dentition, no lesions  NECK: Supple, No JVD, normal thyroid, carotids with normal upstrokes and no bruits  CHEST/LUNG: Clear to auscultation bilaterally, No rales, rhonchi, wheezing, or rubs  HEART: Regular rate and rhythm, No murmurs, rubs, or gallops  ABDOMEN: Soft, nondistended, no masses, guarding, tenderness or rebound, bowel sounds present  EXTREMITIES:  2+ Peripheral Pulses, No clubbing, cyanosis, or edema.   LYMPH: No lymphadenopathy noted  SKIN: No rashes or lesions  NERVOUS SYSTEM:  Alert & Oriented X3, normal cognitive function. Motor Strength 5/5     LABS: No labs obtained today          CAPILLARY BLOOD GLUCOSE          RADIOLOGY & ADDITIONAL TESTS: 73F s/p stage 1 revision R USAMA w/ Dr. Be 4/23/19 sent to ED for admission for stage 2 revision procedure on 9/14. Reports continued R hip pain. Off iv abx. Negative aspiration cx in office. No recent trauma or falls. Saw Dr. Be in the office. No fevers or chills. Patient denies radiation of pain. Patient denies numbness/tingling/burning in the RLE. Has been ambulatory with a 4 point cane. No other complaints at this time.  Patient had a normal echo and stress test in the  past week. Patient s/p revision of hip surgery with right hemiarthroplasty on 09/14/19.. Resting comfortably. Started working with physical therapy     MEDICATIONS  (STANDING):  acetaminophen   Tablet .. 975 milliGRAM(s) Oral every 8 hours  ALBUTerol    90 MICROgram(s) HFA Inhaler 2 Puff(s) Inhalation every 6 hours  amLODIPine   Tablet 10 milliGRAM(s) Oral daily  aspirin enteric coated 325 milliGRAM(s) Oral every 12 hours  celecoxib 200 milliGRAM(s) Oral every 12 hours  cloNIDine 0.1 milliGRAM(s) Oral two times a day  docusate sodium 100 milliGRAM(s) Oral three times a day  ferrous    sulfate 325 milliGRAM(s) Oral daily  lisinopril 10 milliGRAM(s) Oral daily  metoprolol tartrate 25 milliGRAM(s) Oral two times a day  morphine ER Tablet 15 milliGRAM(s) Oral every 8 hours  multivitamin 1 Tablet(s) Oral daily  nicotine - 21 mG/24Hr(s) Patch 1 patch Transdermal daily  NIFEdipine XL 60 milliGRAM(s) Oral daily  pantoprazole    Tablet 40 milliGRAM(s) Oral before breakfast  senna 2 Tablet(s) Oral at bedtime  tiotropium 18 MICROgram(s) Capsule 1 Capsule(s) Inhalation daily  traMADol 50 milliGRAM(s) Oral every 6 hours  vancomycin  IVPB 500 milliGRAM(s) IV Intermittent every 24 hours    MEDICATIONS  (PRN):  bisacodyl Suppository 10 milliGRAM(s) Rectal daily PRN If no bowel movement by POD#2  diazepam    Tablet 5 milliGRAM(s) Oral every 8 hours PRN anxiety  HYDROmorphone   Tablet 2 milliGRAM(s) Oral every 3 hours PRN Moderate Pain (4 - 6)  HYDROmorphone   Tablet 4 milliGRAM(s) Oral every 3 hours PRN Severe Pain (7 - 10)  magnesium hydroxide Suspension 30 milliLiter(s) Oral daily PRN Constipation  melatonin 3 milliGRAM(s) Oral at bedtime PRN Insomnia  metoclopramide Injectable 5 milliGRAM(s) IV Push every 6 hours PRN Nausea and/or Vomiting  morphine  - Injectable 2 milliGRAM(s) IV Push every 2 hours PRN Severe Pain (7 - 10)  polyethylene glycol 3350 17 Gram(s) Oral two times a day PRN Constipation          Vital Signs Last 24 Hrs  T(C): 36.6 (17 Sep 2019 08:48), Max: 36.9 (16 Sep 2019 14:48)  T(F): 97.9 (17 Sep 2019 08:48), Max: 98.4 (16 Sep 2019 14:48)  HR: 51 (17 Sep 2019 08:48) (51 - 68)  BP: 100/57 (17 Sep 2019 08:48) (100/57 - 148/71)  BP(mean): --  RR: 18 (17 Sep 2019 08:48) (18 - 18)  SpO2: 96% (17 Sep 2019 08:48) (95% - 97%)    PHYSICAL EXAM:  GENERAL: NAD, well nourished and conversant  HEAD:  Atraumatic  EYES: EOM, PERRLA, conjunctiva pink and sclera white  ENT: No tonsillar erythema, exudates, or enlargement, moist mucous membranes, good dentition, no lesions  NECK: Supple, No JVD, normal thyroid, carotids with normal upstrokes and no bruits  CHEST/LUNG: Clear to auscultation bilaterally, No rales, rhonchi, wheezing, or rubs  HEART: Regular rate and rhythm, No murmurs, rubs, or gallops  ABDOMEN: Soft, nondistended, no masses, guarding, tenderness or rebound, bowel sounds present  EXTREMITIES:  2+ Peripheral Pulses, No clubbing, cyanosis, or edema.   LYMPH: No lymphadenopathy noted  SKIN: No rashes or lesions  NERVOUS SYSTEM:  Alert & Oriented X3, normal cognitive function. Motor Strength 5/5     LABS: No labs obtained today          CAPILLARY BLOOD GLUCOSE          RADIOLOGY & ADDITIONAL TESTS:

## 2019-09-18 LAB — VANCOMYCIN TROUGH SERPL-MCNC: 7.3 UG/ML — LOW (ref 10–20)

## 2019-09-18 PROCEDURE — 99222 1ST HOSP IP/OBS MODERATE 55: CPT

## 2019-09-18 RX ORDER — DIAZEPAM 5 MG
5 TABLET ORAL EVERY 8 HOURS
Refills: 0 | Status: DISCONTINUED | OUTPATIENT
Start: 2019-09-19 | End: 2019-09-19

## 2019-09-18 RX ADMIN — Medication 100 MILLIGRAM(S): at 13:37

## 2019-09-18 RX ADMIN — HYDROMORPHONE HYDROCHLORIDE 4 MILLIGRAM(S): 2 INJECTION INTRAMUSCULAR; INTRAVENOUS; SUBCUTANEOUS at 12:43

## 2019-09-18 RX ADMIN — HYDROMORPHONE HYDROCHLORIDE 4 MILLIGRAM(S): 2 INJECTION INTRAMUSCULAR; INTRAVENOUS; SUBCUTANEOUS at 00:47

## 2019-09-18 RX ADMIN — ALBUTEROL 2 PUFF(S): 90 AEROSOL, METERED ORAL at 17:51

## 2019-09-18 RX ADMIN — HYDROMORPHONE HYDROCHLORIDE 4 MILLIGRAM(S): 2 INJECTION INTRAMUSCULAR; INTRAVENOUS; SUBCUTANEOUS at 18:20

## 2019-09-18 RX ADMIN — LISINOPRIL 10 MILLIGRAM(S): 2.5 TABLET ORAL at 06:16

## 2019-09-18 RX ADMIN — ALBUTEROL 2 PUFF(S): 90 AEROSOL, METERED ORAL at 21:11

## 2019-09-18 RX ADMIN — Medication 0.1 MILLIGRAM(S): at 17:51

## 2019-09-18 RX ADMIN — HYDROMORPHONE HYDROCHLORIDE 4 MILLIGRAM(S): 2 INJECTION INTRAMUSCULAR; INTRAVENOUS; SUBCUTANEOUS at 17:48

## 2019-09-18 RX ADMIN — Medication 325 MILLIGRAM(S): at 21:10

## 2019-09-18 RX ADMIN — HYDROMORPHONE HYDROCHLORIDE 4 MILLIGRAM(S): 2 INJECTION INTRAMUSCULAR; INTRAVENOUS; SUBCUTANEOUS at 06:17

## 2019-09-18 RX ADMIN — MORPHINE SULFATE 15 MILLIGRAM(S): 50 CAPSULE, EXTENDED RELEASE ORAL at 21:10

## 2019-09-18 RX ADMIN — MORPHINE SULFATE 15 MILLIGRAM(S): 50 CAPSULE, EXTENDED RELEASE ORAL at 13:37

## 2019-09-18 RX ADMIN — Medication 100 MILLIGRAM(S): at 06:15

## 2019-09-18 RX ADMIN — Medication 25 MILLIGRAM(S): at 06:16

## 2019-09-18 RX ADMIN — TIOTROPIUM BROMIDE 1 CAPSULE(S): 18 CAPSULE ORAL; RESPIRATORY (INHALATION) at 12:42

## 2019-09-18 RX ADMIN — MORPHINE SULFATE 2 MILLIGRAM(S): 50 CAPSULE, EXTENDED RELEASE ORAL at 02:11

## 2019-09-18 RX ADMIN — Medication 5 MILLIGRAM(S): at 22:14

## 2019-09-18 RX ADMIN — MORPHINE SULFATE 2 MILLIGRAM(S): 50 CAPSULE, EXTENDED RELEASE ORAL at 02:41

## 2019-09-18 RX ADMIN — Medication 100 MILLIGRAM(S): at 07:32

## 2019-09-18 RX ADMIN — MORPHINE SULFATE 15 MILLIGRAM(S): 50 CAPSULE, EXTENDED RELEASE ORAL at 21:45

## 2019-09-18 RX ADMIN — Medication 25 MILLIGRAM(S): at 17:51

## 2019-09-18 RX ADMIN — CELECOXIB 200 MILLIGRAM(S): 200 CAPSULE ORAL at 18:17

## 2019-09-18 RX ADMIN — CELECOXIB 200 MILLIGRAM(S): 200 CAPSULE ORAL at 17:50

## 2019-09-18 RX ADMIN — MORPHINE SULFATE 15 MILLIGRAM(S): 50 CAPSULE, EXTENDED RELEASE ORAL at 06:45

## 2019-09-18 RX ADMIN — HYDROMORPHONE HYDROCHLORIDE 4 MILLIGRAM(S): 2 INJECTION INTRAMUSCULAR; INTRAVENOUS; SUBCUTANEOUS at 01:15

## 2019-09-18 RX ADMIN — CELECOXIB 200 MILLIGRAM(S): 200 CAPSULE ORAL at 06:15

## 2019-09-18 RX ADMIN — HYDROMORPHONE HYDROCHLORIDE 4 MILLIGRAM(S): 2 INJECTION INTRAMUSCULAR; INTRAVENOUS; SUBCUTANEOUS at 06:45

## 2019-09-18 RX ADMIN — CELECOXIB 200 MILLIGRAM(S): 200 CAPSULE ORAL at 06:45

## 2019-09-18 RX ADMIN — Medication 325 MILLIGRAM(S): at 09:05

## 2019-09-18 RX ADMIN — Medication 100 MILLIGRAM(S): at 17:55

## 2019-09-18 RX ADMIN — HYDROMORPHONE HYDROCHLORIDE 4 MILLIGRAM(S): 2 INJECTION INTRAMUSCULAR; INTRAVENOUS; SUBCUTANEOUS at 13:15

## 2019-09-18 RX ADMIN — AMLODIPINE BESYLATE 10 MILLIGRAM(S): 2.5 TABLET ORAL at 06:15

## 2019-09-18 RX ADMIN — PANTOPRAZOLE SODIUM 40 MILLIGRAM(S): 20 TABLET, DELAYED RELEASE ORAL at 06:15

## 2019-09-18 RX ADMIN — Medication 1 TABLET(S): at 12:42

## 2019-09-18 RX ADMIN — Medication 0.1 MILLIGRAM(S): at 06:16

## 2019-09-18 RX ADMIN — Medication 60 MILLIGRAM(S): at 06:15

## 2019-09-18 RX ADMIN — MORPHINE SULFATE 15 MILLIGRAM(S): 50 CAPSULE, EXTENDED RELEASE ORAL at 14:10

## 2019-09-18 RX ADMIN — ALBUTEROL 2 PUFF(S): 90 AEROSOL, METERED ORAL at 12:42

## 2019-09-18 RX ADMIN — Medication 325 MILLIGRAM(S): at 12:42

## 2019-09-18 RX ADMIN — MORPHINE SULFATE 15 MILLIGRAM(S): 50 CAPSULE, EXTENDED RELEASE ORAL at 06:15

## 2019-09-18 RX ADMIN — Medication 5 MILLIGRAM(S): at 09:04

## 2019-09-18 NOTE — PROGRESS NOTE ADULT - PROBLEM SELECTOR PLAN 4
continue current meds  adjust as needed

## 2019-09-18 NOTE — PROGRESS NOTE ADULT - NSHPATTENDINGPLANDISCUSS_GEN_ALL_CORE
patient and staff

## 2019-09-18 NOTE — PROGRESS NOTE ADULT - PROBLEM SELECTOR PLAN 1
Patient post op   Patient seem pain med seeking  use pain meds with caution  has been participating with physical therapy  Patient may need chronic suppressive thrapy ID to weigh in

## 2019-09-18 NOTE — PROGRESS NOTE ADULT - PROBLEM SELECTOR PLAN 3
Nebs as needed   follow O2 sat  follow for signs of CO@ retention
Nebs as needed   follow O2 sat  follow for signs of CO2 retention
Nebs as needed   follow O2 sat  follow for signs of CO@ retention

## 2019-09-18 NOTE — PROGRESS NOTE ADULT - SUBJECTIVE AND OBJECTIVE BOX
73F s/p stage 1 revision R USAMA w/ Dr. Be 4/23/19 sent to ED for admission for stage 2 revision procedure on 9/14. Reports continued R hip pain. Off iv abx. Negative aspiration cx in office. No recent trauma or falls. Saw Dr. Be in the office. No fevers or chills. Patient denies radiation of pain. Patient denies numbness/tingling/burning in the RLE. Has been ambulatory with a 4 point cane. No other complaints at this time.  Patient had a normal echo and stress test in the  past week. Patient s/p revision of hip surgery with right hemiarthroplasty on 09/14/19.. Resting comfortably. Started working with physical therapy     MEDICATIONS  (STANDING):  ALBUTerol    90 MICROgram(s) HFA Inhaler 2 Puff(s) Inhalation every 6 hours  amLODIPine   Tablet 10 milliGRAM(s) Oral daily  aspirin enteric coated 325 milliGRAM(s) Oral every 12 hours  celecoxib 200 milliGRAM(s) Oral every 12 hours  cloNIDine 0.1 milliGRAM(s) Oral two times a day  docusate sodium 100 milliGRAM(s) Oral three times a day  doxycycline monohydrate Suspension 100 milliGRAM(s) Oral every 12 hours  doxycycline monohydrate Suspension 100 milliGRAM(s) Oral every 12 hours  ferrous    sulfate 325 milliGRAM(s) Oral daily  lisinopril 10 milliGRAM(s) Oral daily  metoprolol tartrate 25 milliGRAM(s) Oral two times a day  morphine ER Tablet 15 milliGRAM(s) Oral every 8 hours  multivitamin 1 Tablet(s) Oral daily  nicotine - 21 mG/24Hr(s) Patch 1 patch Transdermal daily  NIFEdipine XL 60 milliGRAM(s) Oral daily  pantoprazole    Tablet 40 milliGRAM(s) Oral before breakfast  senna 2 Tablet(s) Oral at bedtime  tiotropium 18 MICROgram(s) Capsule 1 Capsule(s) Inhalation daily    MEDICATIONS  (PRN):  bisacodyl Suppository 10 milliGRAM(s) Rectal daily PRN If no bowel movement by POD#2  diazepam    Tablet 5 milliGRAM(s) Oral every 8 hours PRN anxiety  HYDROmorphone   Tablet 2 milliGRAM(s) Oral every 3 hours PRN Moderate Pain (4 - 6)  HYDROmorphone   Tablet 4 milliGRAM(s) Oral every 3 hours PRN Severe Pain (7 - 10)  magnesium hydroxide Suspension 30 milliLiter(s) Oral daily PRN Constipation  melatonin 3 milliGRAM(s) Oral at bedtime PRN Insomnia  metoclopramide Injectable 5 milliGRAM(s) IV Push every 6 hours PRN Nausea and/or Vomiting  polyethylene glycol 3350 17 Gram(s) Oral two times a day PRN Constipation          VITALS:   T(C): 36.7 (09-18-19 @ 08:48), Max: 36.7 (09-18-19 @ 08:48)  HR: 56 (09-18-19 @ 08:48) (55 - 66)  BP: 115/61 (09-18-19 @ 08:48) (115/61 - 139/57)  RR: 18 (09-18-19 @ 08:48) (18 - 18)  SpO2: 96% (09-18-19 @ 08:48) (93% - 98%)  Wt(kg): --    PHYSICAL EXAM:  GENERAL: NAD, well nourished and conversant  HEAD:  Atraumatic  EYES: EOM, PERRLA, conjunctiva pink and sclera white  ENT: No tonsillar erythema, exudates, or enlargement, moist mucous membranes, good dentition, no lesions  NECK: Supple, No JVD, normal thyroid, carotids with normal upstrokes and no bruits  CHEST/LUNG: Clear to auscultation bilaterally, No rales, rhonchi, wheezing, or rubs  HEART: Regular rate and rhythm, No murmurs, rubs, or gallops  ABDOMEN: Soft, nondistended, no masses, guarding, tenderness or rebound, bowel sounds present  EXTREMITIES:  2+ Peripheral Pulses, No clubbing, cyanosis, or edema.   LYMPH: No lymphadenopathy noted  SKIN: No rashes or lesions  NERVOUS SYSTEM:  Alert & Oriented X3, normal cognitive function. Motor Strength 5/5     LABS:                      CAPILLARY BLOOD GLUCOSE          RADIOLOGY & ADDITIONAL TESTS:

## 2019-09-18 NOTE — PROGRESS NOTE ADULT - PROBLEM SELECTOR PROBLEM 3
Emphysema, unspecified

## 2019-09-18 NOTE — PROGRESS NOTE ADULT - PROBLEM SELECTOR PROBLEM 1
Hip pain, chronic, right
S/P revision of total hip
Hip pain, chronic, right
Hip pain, chronic, right

## 2019-09-18 NOTE — PROGRESS NOTE ADULT - SUBJECTIVE AND OBJECTIVE BOX
Subjective: Patient seen and examined. No new events except as noted.     SUBJECTIVE/ROS:  feels ok       MEDICATIONS:  MEDICATIONS  (STANDING):  ALBUTerol    90 MICROgram(s) HFA Inhaler 2 Puff(s) Inhalation every 6 hours  amLODIPine   Tablet 10 milliGRAM(s) Oral daily  aspirin enteric coated 325 milliGRAM(s) Oral every 12 hours  celecoxib 200 milliGRAM(s) Oral every 12 hours  cloNIDine 0.1 milliGRAM(s) Oral two times a day  docusate sodium 100 milliGRAM(s) Oral three times a day  ferrous    sulfate 325 milliGRAM(s) Oral daily  lisinopril 10 milliGRAM(s) Oral daily  metoprolol tartrate 25 milliGRAM(s) Oral two times a day  morphine ER Tablet 15 milliGRAM(s) Oral every 8 hours  multivitamin 1 Tablet(s) Oral daily  nicotine - 21 mG/24Hr(s) Patch 1 patch Transdermal daily  NIFEdipine XL 60 milliGRAM(s) Oral daily  pantoprazole    Tablet 40 milliGRAM(s) Oral before breakfast  senna 2 Tablet(s) Oral at bedtime  tiotropium 18 MICROgram(s) Capsule 1 Capsule(s) Inhalation daily  vancomycin  IVPB 500 milliGRAM(s) IV Intermittent every 24 hours      PHYSICAL EXAM:  T(C): 36.6 (09-18-19 @ 04:34), Max: 36.8 (09-17-19 @ 12:30)  HR: 60 (09-18-19 @ 06:19) (51 - 66)  BP: 128/64 (09-18-19 @ 06:19) (100/51 - 139/57)  RR: 18 (09-18-19 @ 04:34) (18 - 18)  SpO2: 96% (09-18-19 @ 04:34) (93% - 98%)  Wt(kg): --  I&O's Summary    17 Sep 2019 07:01  -  18 Sep 2019 07:00  --------------------------------------------------------  IN: 1640 mL / OUT: 200 mL / NET: 1440 mL            JVP: Normal  Neck: supple  Lung: clear   CV: S1 S2 , Murmur:  Abd: soft  Ext: No edema  neuro: Awake / alert  Psych: flat affect  Skin: normal``    LABS/DATA:    CARDIAC MARKERS:                  proBNP:   Lipid Profile:   HgA1c:   TSH:     TELE:  EKG:

## 2019-09-18 NOTE — CONSULT NOTE ADULT - ASSESSMENT
HTN  labile  cont current meds for now     CAD history of stent  cont asa  stable  asymptomatic     AS  Moderate   stable     PreOP  Based on current ACC/AHA guidelines, patient history and physical exam, the patient is considered to have elevated risk  recent cardiac work up unremarkable   no objection to proceed to planned surgery
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 THR, kika-prosthetic R femur fx (s/p Total Hip revision with ORIF, 6/30/18), recent re-admission (07/16 - 07/24/18) for worsening R hip pain and decreased ability to ambulate now s/p R femur [place] B1 periprosthetic fracture ORIF (07/19/18) admitted 8/2018 with MRSA bacteremia and hardware collection s/p washout but the hardware was not removed, OR cxs also with MRSA she completed a course of vanco and then bactrim for suppressive therapy but again  presented with hip erythema and edema again the hardware was not removed in hope for femur union, and she received another course of vanco and then doxy and symptoms started after she finished the antibiotics and came back with fever, chills, erythema edema and fluctuation on the hip incision  here afebrile WBC normal  CT with lucency, fractures and cellulitis  s/p hardware removal 4/23, purulence was found along the entire lateral aspect of R leg with loose hardware, spacer was placed and she was treated with course of vanco and then has aspiration off ab . 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
73F s/p stage 1 revision R USAMA w/ Dr. Be 4/23/19 sent to ED for admission for stage 2 revision procedure on 9/14. Reports continued R hip pain. Off iv abx. Negative aspiration cx in office. No recent trauma or falls. Saw Dr. Be in the office today. No fevers or chills. Patient denies radiation of pain. Patient denies numbness/tingling/burning in the RLE. Has been ambulatory with a 4 point cane. No other complaints at this time.  Patient had a normal echo and stress test in the  past week

## 2019-09-18 NOTE — CONSULT NOTE ADULT - ASSESSMENT
71 yo F, w/ PMH of emphysema, CAD, HTN, revision USAMA with ORIF for periprosthetic R femur fracture on 6/30/18 by Dr. Be, discharged on 7/3/18 to [facility] rehab, readmitted from 07/16 - 07/24/18 w/ worsening R hip pain and decreased ability to ambulate, now s/p right femur [place] B1 periprosthetic fracture ORIF by Dr. Cornell on 07/19/18, BIBEMS s/p mechanical fall on stairs during the night c/o R hip and R leg pain, redness, swelling, stiffness. Patient denies other complaints. Denies headache, neck stiffness, fever/chills, vision change, chest pain, shortness of breath, difficulty breathing, palpitations, weakness, dizziness, nausea, vomiting, diarrhea, syncope.   From prior, note, patient had initial right hip fracture surgery at [facility] with a hip pin placement in 2015.  She developed avascular necrosis, necessitating a right total hip replacement at [facility] in 2017.  She was well for one year and had an accidental fall taking out her garbage June, 2018. She was then diagnosed with a periprosthetic fracture. Repair consisted of lengthening the right total hip replacement and then stabilization with the distal femur.  At rehabilitation, she had a nontraumatic separation of this distal stabilization which required a periprosthetic revision. Patients/p revision of right hip surgery as of 07/19/18. no

## 2019-09-18 NOTE — PROGRESS NOTE ADULT - ASSESSMENT
HTN  stable  cont current meds     CAD history of stent  cont asa  asymptomatic     AS  Moderate   hemodynamically stable

## 2019-09-18 NOTE — PROGRESS NOTE ADULT - SUBJECTIVE AND OBJECTIVE BOX
POD #4    Patient reports to be doing very well overall, NAD.  She reports to be moving around without an issue.  No acute events overnight.      Vital Signs Last 24 Hrs  T(C): 36.6 (18 Sep 2019 04:34), Max: 36.8 (17 Sep 2019 12:30)  T(F): 97.8 (18 Sep 2019 04:34), Max: 98.2 (17 Sep 2019 12:30)  HR: 55 (18 Sep 2019 04:34) (51 - 66)  BP: 118/52 (18 Sep 2019 04:34) (100/51 - 148/71)  BP(mean): --  RR: 18 (18 Sep 2019 04:34) (18 - 18)  SpO2: 96% (18 Sep 2019 04:34) (93% - 98%)    Complete Blood Count in AM (09.15.19 @ 08:57)    WBC Count: 8.83 K/uL    RBC Count: 4.03 M/uL    Hemoglobin: 11.3 g/dL    Hematocrit: 38.4 %    Mean Cell Volume: 95.3 fl    Mean Cell Hemoglobin: 28.0 pg    Mean Cell Hemoglobin Conc: 29.4 gm/dL    Red Cell Distrib Width: 18.0 %    Platelet Count - Automated: 212 K/uL    Basic Metabolic Panel in AM (09.15.19 @ 06:24)    Sodium, Serum: 136 mmol/L    Potassium, Serum: 5.2 mmol/L    Chloride, Serum: 98 mmol/L    Carbon Dioxide, Serum: 23 mmol/L    Anion Gap, Serum: 15 mmol/L    Blood Urea Nitrogen, Serum: 28 mg/dL    Creatinine, Serum: 0.72 mg/dL    Glucose, Serum: 135 mg/dL    Calcium, Total Serum: 8.1 mg/dL    Physical Exam:  General: Patient appears to be doing well, A&O x 3, NAD  Extremities-  RLE: Tegaderm dressing C/D/I  Sensation and motor grossly intact  +DP/PT. Good capillary refill POD #4    Patient reports to be doing very well overall, NAD.  She reports to be moving around without an issue.  No acute events overnight.      Vital Signs Last 24 Hrs  T(C): 36.6 (18 Sep 2019 04:34), Max: 36.8 (17 Sep 2019 12:30)  T(F): 97.8 (18 Sep 2019 04:34), Max: 98.2 (17 Sep 2019 12:30)  HR: 55 (18 Sep 2019 04:34) (51 - 66)  BP: 118/52 (18 Sep 2019 04:34) (100/51 - 148/71)  BP(mean): --  RR: 18 (18 Sep 2019 04:34) (18 - 18)  SpO2: 96% (18 Sep 2019 04:34) (93% - 98%)    Complete Blood Count in AM (09.15.19 @ 08:57)    WBC Count: 8.83 K/uL    RBC Count: 4.03 M/uL    Hemoglobin: 11.3 g/dL    Hematocrit: 38.4 %    Mean Cell Volume: 95.3 fl    Mean Cell Hemoglobin: 28.0 pg    Mean Cell Hemoglobin Conc: 29.4 gm/dL    Red Cell Distrib Width: 18.0 %    Platelet Count - Automated: 212 K/uL    Basic Metabolic Panel in AM (09.15.19 @ 06:24)    Sodium, Serum: 136 mmol/L    Potassium, Serum: 5.2 mmol/L    Chloride, Serum: 98 mmol/L    Carbon Dioxide, Serum: 23 mmol/L    Anion Gap, Serum: 15 mmol/L    Blood Urea Nitrogen, Serum: 28 mg/dL    Creatinine, Serum: 0.72 mg/dL    Glucose, Serum: 135 mg/dL    Calcium, Total Serum: 8.1 mg/dL    Physical Exam:  General: Patient appears to be doing well, A&O x 3, NAD  Extremities-  Calves s/nt bilat  RLE: Tegaderm dressing C/D/I  Sensation and motor grossly intact  +PF/DF/EHL/FHL  +DP

## 2019-09-18 NOTE — PROGRESS NOTE ADULT - PROBLEM SELECTOR PLAN 1
PT, PWB 30%  FU OR Cultures, no growth to 9/17  Continue vancomycin 500mg IV QD until preliminary OR cultures are back   Await vanco trough this AM  Pain management celecoxib 200mg q12h, morphine ER 15mg PO q12h, hydomorphone PO 2mg q3h, PRN for moderate pain, hydromorphone PO 4mg, PRN for severe pain, morphine 2mg IV push q2h, PRN for severe pain   Continue to monitor      ROD aMn  Orthopedic beeper [id number] PT, PWB 30%, post prec  FU OR Cultures, no growth to 9/17  Continue vancomycin 500mg IV QD until preliminary OR cultures are back   Await vanco trough this AM  Pain management celecoxib 200mg q12h, morphine ER 15mg PO q12h, hydomorphone PO 2mg q3h, PRN for moderate pain, hydromorphone PO 4mg, PRN for severe pain, morphine 2mg IV push q2h, PRN for severe pain   Continue to monitor      WALLACE ManS  Orthopedic beeper [id number]    I have reviewed the student's note, examined the patient, changed the note where necessary.  I agree with the above note.    OMAR Lara PA-C  #[phone]

## 2019-09-18 NOTE — PROGRESS NOTE ADULT - PROBLEM SELECTOR PROBLEM 2
Coronary artery disease

## 2019-09-18 NOTE — PROGRESS NOTE ADULT - PROBLEM SELECTOR PLAN 2
Patient with W/U prior to hospitalization  continue current meds

## 2019-09-19 ENCOUNTER — TRANSCRIPTION ENCOUNTER (OUTPATIENT)
Age: 74
End: 2019-09-19

## 2019-09-19 VITALS
DIASTOLIC BLOOD PRESSURE: 43 MMHG | SYSTOLIC BLOOD PRESSURE: 121 MMHG | TEMPERATURE: 98 F | RESPIRATION RATE: 18 BRPM | HEART RATE: 52 BPM | OXYGEN SATURATION: 96 %

## 2019-09-19 PROCEDURE — 97162 PT EVAL MOD COMPLEX 30 MIN: CPT

## 2019-09-19 PROCEDURE — 97110 THERAPEUTIC EXERCISES: CPT

## 2019-09-19 PROCEDURE — 85027 COMPLETE CBC AUTOMATED: CPT

## 2019-09-19 PROCEDURE — 80202 ASSAY OF VANCOMYCIN: CPT

## 2019-09-19 PROCEDURE — 97116 GAIT TRAINING THERAPY: CPT

## 2019-09-19 PROCEDURE — 87070 CULTURE OTHR SPECIMN AEROBIC: CPT

## 2019-09-19 PROCEDURE — 99285 EMERGENCY DEPT VISIT HI MDM: CPT | Mod: 25

## 2019-09-19 PROCEDURE — 87086 URINE CULTURE/COLONY COUNT: CPT

## 2019-09-19 PROCEDURE — 80053 COMPREHEN METABOLIC PANEL: CPT

## 2019-09-19 PROCEDURE — 93005 ELECTROCARDIOGRAM TRACING: CPT

## 2019-09-19 PROCEDURE — 73501 X-RAY EXAM HIP UNI 1 VIEW: CPT

## 2019-09-19 PROCEDURE — 81001 URINALYSIS AUTO W/SCOPE: CPT

## 2019-09-19 PROCEDURE — 97535 SELF CARE MNGMENT TRAINING: CPT

## 2019-09-19 PROCEDURE — 80307 DRUG TEST PRSMV CHEM ANLYZR: CPT

## 2019-09-19 PROCEDURE — 86850 RBC ANTIBODY SCREEN: CPT

## 2019-09-19 PROCEDURE — 73552 X-RAY EXAM OF FEMUR 2/>: CPT

## 2019-09-19 PROCEDURE — 73502 X-RAY EXAM HIP UNI 2-3 VIEWS: CPT

## 2019-09-19 PROCEDURE — 97165 OT EVAL LOW COMPLEX 30 MIN: CPT

## 2019-09-19 PROCEDURE — 86900 BLOOD TYPING SEROLOGIC ABO: CPT

## 2019-09-19 PROCEDURE — 97530 THERAPEUTIC ACTIVITIES: CPT

## 2019-09-19 PROCEDURE — 87040 BLOOD CULTURE FOR BACTERIA: CPT

## 2019-09-19 PROCEDURE — 76937 US GUIDE VASCULAR ACCESS: CPT

## 2019-09-19 PROCEDURE — 80048 BASIC METABOLIC PNL TOTAL CA: CPT

## 2019-09-19 PROCEDURE — 85730 THROMBOPLASTIN TIME PARTIAL: CPT

## 2019-09-19 PROCEDURE — 72170 X-RAY EXAM OF PELVIS: CPT

## 2019-09-19 PROCEDURE — 36000 PLACE NEEDLE IN VEIN: CPT | Mod: RT

## 2019-09-19 PROCEDURE — 86901 BLOOD TYPING SEROLOGIC RH(D): CPT

## 2019-09-19 PROCEDURE — 71045 X-RAY EXAM CHEST 1 VIEW: CPT

## 2019-09-19 PROCEDURE — C1713: CPT

## 2019-09-19 PROCEDURE — C1776: CPT

## 2019-09-19 PROCEDURE — 94640 AIRWAY INHALATION TREATMENT: CPT

## 2019-09-19 PROCEDURE — 85610 PROTHROMBIN TIME: CPT

## 2019-09-19 RX ORDER — AMLODIPINE BESYLATE 2.5 MG/1
1 TABLET ORAL
Qty: 0 | Refills: 0 | DISCHARGE

## 2019-09-19 RX ORDER — TRAMADOL HYDROCHLORIDE 50 MG/1
1 TABLET ORAL
Qty: 21 | Refills: 0
Start: 2019-09-19 | End: 2019-09-25

## 2019-09-19 RX ORDER — METOPROLOL TARTRATE 50 MG
1 TABLET ORAL
Qty: 0 | Refills: 0 | DISCHARGE

## 2019-09-19 RX ORDER — HYDROMORPHONE HYDROCHLORIDE 2 MG/ML
1 INJECTION INTRAMUSCULAR; INTRAVENOUS; SUBCUTANEOUS
Qty: 42 | Refills: 0
Start: 2019-09-19 | End: 2019-09-25

## 2019-09-19 RX ORDER — MORPHINE SULFATE 50 MG/1
1 CAPSULE, EXTENDED RELEASE ORAL
Qty: 21 | Refills: 0
Start: 2019-09-19 | End: 2019-09-25

## 2019-09-19 RX ORDER — ASPIRIN/CALCIUM CARB/MAGNESIUM 324 MG
1 TABLET ORAL
Qty: 84 | Refills: 0
Start: 2019-09-19 | End: 2019-10-30

## 2019-09-19 RX ADMIN — Medication 0.1 MILLIGRAM(S): at 05:05

## 2019-09-19 RX ADMIN — CELECOXIB 200 MILLIGRAM(S): 200 CAPSULE ORAL at 05:51

## 2019-09-19 RX ADMIN — Medication 325 MILLIGRAM(S): at 08:36

## 2019-09-19 RX ADMIN — AMLODIPINE BESYLATE 10 MILLIGRAM(S): 2.5 TABLET ORAL at 05:04

## 2019-09-19 RX ADMIN — Medication 60 MILLIGRAM(S): at 05:04

## 2019-09-19 RX ADMIN — HYDROMORPHONE HYDROCHLORIDE 4 MILLIGRAM(S): 2 INJECTION INTRAMUSCULAR; INTRAVENOUS; SUBCUTANEOUS at 10:51

## 2019-09-19 RX ADMIN — HYDROMORPHONE HYDROCHLORIDE 4 MILLIGRAM(S): 2 INJECTION INTRAMUSCULAR; INTRAVENOUS; SUBCUTANEOUS at 02:20

## 2019-09-19 RX ADMIN — MORPHINE SULFATE 15 MILLIGRAM(S): 50 CAPSULE, EXTENDED RELEASE ORAL at 05:03

## 2019-09-19 RX ADMIN — HYDROMORPHONE HYDROCHLORIDE 4 MILLIGRAM(S): 2 INJECTION INTRAMUSCULAR; INTRAVENOUS; SUBCUTANEOUS at 05:03

## 2019-09-19 RX ADMIN — HYDROMORPHONE HYDROCHLORIDE 4 MILLIGRAM(S): 2 INJECTION INTRAMUSCULAR; INTRAVENOUS; SUBCUTANEOUS at 05:51

## 2019-09-19 RX ADMIN — MORPHINE SULFATE 15 MILLIGRAM(S): 50 CAPSULE, EXTENDED RELEASE ORAL at 05:51

## 2019-09-19 RX ADMIN — Medication 100 MILLIGRAM(S): at 05:06

## 2019-09-19 RX ADMIN — HYDROMORPHONE HYDROCHLORIDE 4 MILLIGRAM(S): 2 INJECTION INTRAMUSCULAR; INTRAVENOUS; SUBCUTANEOUS at 10:21

## 2019-09-19 RX ADMIN — CELECOXIB 200 MILLIGRAM(S): 200 CAPSULE ORAL at 05:04

## 2019-09-19 RX ADMIN — Medication 25 MILLIGRAM(S): at 05:04

## 2019-09-19 RX ADMIN — LISINOPRIL 10 MILLIGRAM(S): 2.5 TABLET ORAL at 05:04

## 2019-09-19 RX ADMIN — ALBUTEROL 2 PUFF(S): 90 AEROSOL, METERED ORAL at 05:06

## 2019-09-19 RX ADMIN — PANTOPRAZOLE SODIUM 40 MILLIGRAM(S): 20 TABLET, DELAYED RELEASE ORAL at 05:03

## 2019-09-19 RX ADMIN — HYDROMORPHONE HYDROCHLORIDE 4 MILLIGRAM(S): 2 INJECTION INTRAMUSCULAR; INTRAVENOUS; SUBCUTANEOUS at 01:37

## 2019-09-19 NOTE — DISCHARGE NOTE NURSING/CASE MANAGEMENT/SOCIAL WORK - NSDCFUADDAPPT_GEN_ALL_CORE_FT
Please call Dr. Be's office within next few days to schedule a follow up appointment about 14 days after surgery.     Must f/u with Dr So (infectious disease) within 1 month.  Recommend follow up with medical MD, within next 4 weeks.

## 2019-09-19 NOTE — PROGRESS NOTE ADULT - ASSESSMENT
A/P: 74 y/o F s/p R Hip PHI Stage 2 Revision      DVT ppx- ASA 325mg PO BID  Pain management prn  RLE PWB 30%  GI ppx  Appreciate ID Note  PO abx-doxycycline as per ID  Discharge to Home with PO abx      LELAND Lundberg  Orthopedic Surgery  [phone]

## 2019-09-19 NOTE — PROGRESS NOTE ADULT - SUBJECTIVE AND OBJECTIVE BOX
Subjective: Patient seen and examined. No new events except as noted.     SUBJECTIVE/ROS:  No chest pain, dyspnea, palpitation, or dizziness.       MEDICATIONS:  MEDICATIONS  (STANDING):  ALBUTerol    90 MICROgram(s) HFA Inhaler 2 Puff(s) Inhalation every 6 hours  amLODIPine   Tablet 10 milliGRAM(s) Oral daily  aspirin enteric coated 325 milliGRAM(s) Oral every 12 hours  celecoxib 200 milliGRAM(s) Oral every 12 hours  cloNIDine 0.1 milliGRAM(s) Oral two times a day  docusate sodium 100 milliGRAM(s) Oral three times a day  doxycycline hyclate Capsule 100 milliGRAM(s) Oral every 12 hours  doxycycline monohydrate Suspension 100 milliGRAM(s) Oral every 12 hours  ferrous    sulfate 325 milliGRAM(s) Oral daily  lisinopril 10 milliGRAM(s) Oral daily  metoprolol tartrate 25 milliGRAM(s) Oral two times a day  morphine ER Tablet 15 milliGRAM(s) Oral every 8 hours  multivitamin 1 Tablet(s) Oral daily  nicotine - 21 mG/24Hr(s) Patch 1 patch Transdermal daily  NIFEdipine XL 60 milliGRAM(s) Oral daily  pantoprazole    Tablet 40 milliGRAM(s) Oral before breakfast  senna 2 Tablet(s) Oral at bedtime  tiotropium 18 MICROgram(s) Capsule 1 Capsule(s) Inhalation daily      PHYSICAL EXAM:  T(C): 36.7 (09-19-19 @ 08:43), Max: 36.8 (09-18-19 @ 16:39)  HR: 52 (09-19-19 @ 08:43) (52 - 68)  BP: 121/43 (09-19-19 @ 08:43) (110/61 - 156/73)  RR: 18 (09-19-19 @ 08:43) (18 - 18)  SpO2: 96% (09-19-19 @ 08:43) (94% - 97%)  Wt(kg): --  I&O's Summary    18 Sep 2019 07:01  -  19 Sep 2019 07:00  --------------------------------------------------------  IN: 1040 mL / OUT: 0 mL / NET: 1040 mL            JVP: Normal  Neck: supple  Lung: clear   CV: S1 S2 , Murmur:  Abd: soft  Ext: No edema  neuro: Awake / alert  Psych: flat affect  Skin: normal``    LABS/DATA:    CARDIAC MARKERS:                  proBNP:   Lipid Profile:   HgA1c:   TSH:     TELE:  EKG:

## 2019-09-19 NOTE — DISCHARGE NOTE NURSING/CASE MANAGEMENT/SOCIAL WORK - PATIENT PORTAL LINK FT
You can access the FollowMyHealth Patient Portal offered by [facility] by registering at the following website: [email or web address]. By joining [employer]’s FollowMyHealth portal, you will also be able to view your health information using other applications (apps) compatible with our system.

## 2019-09-19 NOTE — PROGRESS NOTE ADULT - PROVIDER SPECIALTY LIST ADULT
Cardiology
Internal Medicine
Orthopedics
Internal Medicine

## 2019-09-19 NOTE — PROGRESS NOTE ADULT - REASON FOR ADMISSION
right hip pain
right hip pain
Right hip pain/ Stage 2 revision total hip replacement
right hip pain

## 2019-09-19 NOTE — PROGRESS NOTE ADULT - SUBJECTIVE AND OBJECTIVE BOX
Patient resting without complaints.  No chest pain, SOB, N/V.    T(C): 36.2 (09-19-19 @ 05:09), Max: 36.8 (09-18-19 @ 16:39)  HR: 60 (09-19-19 @ 05:09) (56 - 68)  BP: 156/73 (09-19-19 @ 05:09) (110/61 - 156/73)  RR: 18 (09-19-19 @ 05:09) (18 - 18)  SpO2: 97% (09-19-19 @ 05:09) (94% - 97%)      Exam:  Alert and Oriented, No Acute Distress  Cards: +S1/S2, RRR  Pulm: CTAB  Lower Extremities:  RLE: R hip dressing C/D/I, Mild incisional tenderness, no erythema appreciated,  compartments soft, DP2+, +DF, +PF  Calves Soft, Non-tender bilaterally

## 2019-09-29 LAB
CULTURE RESULTS: SIGNIFICANT CHANGE UP
SPECIMEN SOURCE: SIGNIFICANT CHANGE UP

## 2019-10-01 ENCOUNTER — APPOINTMENT (OUTPATIENT)
Dept: ORTHOPEDIC SURGERY | Facility: CLINIC | Age: 74
End: 2019-10-01
Payer: MEDICARE

## 2019-10-01 PROCEDURE — 73502 X-RAY EXAM HIP UNI 2-3 VIEWS: CPT | Mod: RT

## 2019-10-01 PROCEDURE — 99024 POSTOP FOLLOW-UP VISIT: CPT

## 2019-11-05 ENCOUNTER — APPOINTMENT (OUTPATIENT)
Dept: ORTHOPEDIC SURGERY | Facility: CLINIC | Age: 74
End: 2019-11-05

## 2019-12-26 RX ORDER — DOXYCYCLINE HYCLATE 100 MG/1
100 CAPSULE ORAL
Qty: 60 | Refills: 0 | Status: ACTIVE | COMMUNITY
Start: 2019-12-26 | End: 1900-01-01

## 2020-06-01 NOTE — PRE-OP CHECKLIST - ASSESSMENT, HISTORY & PHYSICAL COMPLETED AND ON MEDICAL RECORD
Render Post-Care Instructions In Note?: no Detail Level: Detailed Number Of Freeze-Thaw Cycles: 2 freeze-thaw cycles Duration Of Freeze Thaw-Cycle (Seconds): 0 Consent: The patient's consent was obtained including but not limited to risks of crusting, scabbing, blistering, scarring, darker or lighter pigmentary change, recurrence, incomplete removal and infection. Post-Care Instructions: I reviewed with the patient in detail post-care instructions. Patient is to wear sunprotection, and avoid picking at any of the treated lesions. Pt may apply Vaseline to crusted or scabbing areas. done

## 2020-06-02 ENCOUNTER — APPOINTMENT (OUTPATIENT)
Dept: ORTHOPEDIC SURGERY | Facility: CLINIC | Age: 75
End: 2020-06-02
Payer: MEDICARE

## 2020-06-02 PROCEDURE — 99214 OFFICE O/P EST MOD 30 MIN: CPT

## 2020-06-02 PROCEDURE — 72170 X-RAY EXAM OF PELVIS: CPT

## 2020-08-01 NOTE — PHYSICAL THERAPY INITIAL EVALUATION ADULT - GENERAL OBSERVATIONS, REHAB EVAL
0 Pt received supine in bed, +abd pillow, +venodynes, +IVL, A&Ox3, agreeable to physical therapy eval, gabe 50 min. Pt with high levels of anxiety.

## 2020-08-05 NOTE — CONSULT NOTE ADULT - REASON FOR ADMISSION
Pt presents ambulatory w/ L lower tooth pain for past week, and L neck pain for past couple days.  Pt denies fever.  
R hip/femur infected hardware
Right hip infection
R hip/femur infected hardware

## 2020-08-25 NOTE — ED PROVIDER NOTE - NS ED MD DISPO ISOLATION TYPES
From: Jumana Sommer  To: Geena LION Anna  Sent: 8/24/2020 9:50 PM CDT  Subject: Other    Blood Sugar and a few Blood Pressure readings from June - Aug 2020  Linda 2 reading 112 day before RX 9:27pm   Linda 3 reading 133 started RX 10:15PM  June 4 reading 115 10:35pm  June 5 reading 119 06:03am  June 6 reading 115 8:00am  June 8 135 reading 7:02 am  June 9 104 reading 6:25pm blood pressure 127/75 pulse 76  Linda 10 129 reading 7:00am  June 11 115 reading 09:15 pm  June 12 123 reading 06:20am  June 14 111 reading 08:25 am  Linda 15 112 reading 5:25am  June 16 145 reading 8:54pm  June 17 118 reading 5:26am  June 18 111 reading 8:46pm  June 19 101 reading 7:43pm  June 21 130 reading 6:00am  June 22 98 reading 5:42am  June 23 98 reading 10:39pm  June 24 116 reading 9:30pm  June 25 82 reading 7:00am  June 26 86 reading 10:04pm  June 27 91 reading 7:30am  June 28 79 reading 9:15pm  June 29 96 reading 5:35am  June 30 94 reading 9:02pm  July 1 106 reading 5:30am   July 2 79 reading 9:00am  July 3 97 reading 5:30am  July 4 102 reading 11:30pm  July 5 102 reading 10:10 pm   July 6 105 reading 08:00am  July 8 83 reading 5:25am  July 9 122 reading 8:35 pm  July 10 93 reading 5:35 am  July 11 115 reading 9:00pm  July 12 85 reading 7:38am  July 13 104 reading 9:00pm  July 14 97 reading 5:33am  July 15 114 reading 9:00pm  July 16 83 reading 5:30am  July 17 89 reading 8:45 pm  July 18 99 reading 8:15am  July 19 101 reading 8:45pm  July 20 102 reading 5:34am  July 21 105 reading 8:30pm  July 22 106 reading 5:36am  July 23 112 reading 5:30am  July 24 102 reading 5:35am blood pressure 129/75 pulse 76  July 25 131 reading 10:00pm  July 26 98 reading 8:27am  July 27 94 reading 10:00pm  July 28 104 reading 5:40am  July 29 90 reading 9:35pm  July 30 72 reading 5:42am  July 31 93 reading 8:25 pm  Aug 1 93 reading 9:35 am  Aug 2 107 reading 8:39pm  Aug 3 91 reading 5:39am  Aug 4 116 reading 11:06pm  Aug 5 97 reading 8:34pm blood pressure  118/80 pulse 80  Aug 6 108 reading 9:00pm  Aug 7 112 reading 9:16pm  Aug 8 100 reading 8:34am  Aug 9 104 reading 9:00pm  Aug 10 90 reading 9:00am  Aug 11 106 reading 813pm  Aug 12 110 reading 5:48am  Aug 13 101 reading 7:33pm blood pressure 122/84 pulse 84  Aug 14 104 reading 5:45am  Aug 16 87 reading 9:00am  Aug 17 101 reading 8:11pm  Aug 18 116 reading 5:44am  Aug 20 112 reading 5:46am  Aug 22 120 reading 9:15pm  Aug 24 82 reading 7:22pm blood pressure 125/79 pulse 80  Will I need to continue to test once daily or can this be changed to 3 times a week until my next labs?   None

## 2020-09-08 NOTE — ED ADULT NURSE NOTE - NS ED NOTE ABUSE RESPONSE YN
OBESE, difficult to move , legs in dressing , inspected on camera right leg opened dressing by NA at bedside , open skin wounds on the shin Yes

## 2020-11-19 NOTE — ED PROVIDER NOTE - GASTROINTESTINAL NEGATIVE STATEMENT, MLM
Diabetes mellitus no abdominal pain, no bloating, no constipation, no diarrhea, no nausea and no vomiting.

## 2020-12-08 NOTE — DISCHARGE NOTE ADULT - NSTOBACCONORTHWELLF/U_GEN_A_CORE_NCS
[Care Plan reviewed and provided to patient/caregiver] : Care plan reviewed and provided to patient/caregiver [Care Plan reviewed every ___ weeks] : Care plan reviewed every [unfilled] weeks [Care Plan managed/Care coordinated by: ___] : Care plan managed/Care coordinated by: [unfilled] [Initiation or substantial revisions made to care plan involving mod/high medical decision making for complex CCM] : Initiation or substantial revisions made to care plan involving mod/high medical decision making for complex CCM [Patient/Caregiver agrees to have other providers send summary of their care to this office] : Patient/caregiver agrees to have other providers send summary of their care to this office Information Given - No appointment made

## 2021-01-01 NOTE — ED ADULT NURSE NOTE - EXTENSIONS OF SELF_ADULT
MRN: [id number]  Joe is a 4 week old male who presents for a 1 month health maintenance visit. The patient was born at 34w0d weeks via  after PPROM. Baby was in NICU due to prematurity. He was diagnosed with congenital hypothyroidism and started on Levothyroxine. Had apnea of prematurity Also had US of head showing irregular lobulated left choroid plexus, which was improved on subsequent imaging (thought to be related to resolution of prior choroid hermorrhage). Received phototherapy -. The  screen was collected - pending. A hearing screen was passed and the  hepatitis B vaccine was given prior to discharge from the hospital.    Changes since last visit:   Recent hospitalizations/ER visits: No    Congenital hypothyroidism  TSH >621.6, T4 2.1 on  screen   Serum , T4 1.8 ()  - 25 mcg daily  - F/u appt Dr. Manuel Wwed  1:40    Spasms  - mom reports she noted past few days  - Unsure if bc baby is cold    SOCIAL/FAMILY HISTORY  Household Members: mom, dad  Support/Family Balance: feels supported  Parents working outside of home: Dad works outside home, mom is at home  Childcare: none  Nutrition: 2hours 2 oz  Stools: 1 larg  Wets: 7 x   Sleep:  10hours  Behavior/Activity: normal  Tummy time: yes  Vit d - yes    CURRENT MEDICATIONS  Current Outpatient Medications   Medication Sig Dispense Refill   • levothyroxine 25 MCG tablet Take 1 tablet by mouth daily. 90 tablet 0   • pediatric multivitamin with iron (POLY-VI-SOL WITH IRON) 11 MG/ML Solution Take 1 mL by mouth daily. 50 mL 12     No current facility-administered medications for this visit.       OBJECTIVE  Vitals:    21 1604   Pulse: 150   Resp: 48   Temp: 97.8 °F (36.6 °C)   TempSrc: Temporal   Weight: 2.87 kg (6 lb 5.2 oz)   Height: 20.08\" (51 cm)   HC: 33 cm (12.99\")     Physical Exam  Constitutional:       Appearance: He is not toxic-appearing.   HENT:      Head: Normocephalic and atraumatic. Anterior fontanelle  is flat.      Right Ear: External ear normal.      Left Ear: External ear normal.      Nose: Nose normal. No congestion or rhinorrhea.   Eyes:      General:         Right eye: No discharge.         Left eye: No discharge.   Neck:      Comments: No clavicular crepitus  Cardiovascular:      Rate and Rhythm: Normal rate and regular rhythm.      Heart sounds: No murmur heard.     Pulmonary:      Effort: Pulmonary effort is normal. No respiratory distress.      Breath sounds: Normal breath sounds. No stridor. No wheezing.   Abdominal:      General: Abdomen is flat. There is no distension.      Palpations: Abdomen is soft. There is no mass.   Genitourinary:     Penis: Normal.    Musculoskeletal:      Right hip: Negative right Ortolani and negative right Allison.      Left hip: Negative left Ortolani and negative left Allison.   Lymphadenopathy:      Cervical: No cervical adenopathy.   Skin:     General: Skin is warm and dry.      Capillary Refill: Capillary refill takes less than 2 seconds.      Turgor: Normal.      Findings: No rash.   Neurological:      General: No focal deficit present.      Mental Status: He is alert.      Motor: No abnormal muscle tone.      Primitive Reflexes: Suck normal. Symmetric Sangita.      Comments: Slight jerking/spasming noted intermitently, no nystagmus. All reflexes In tact          ASSESSMENT AND PLAN  Problem List Items Addressed This Visit        Endocrine and Metabolic    Congenital hypothyroidism     -Continue 25 mcg levothyroxine  -Appointment with Dr. Manuel 2021            Gravid and     Premature infant of 34 weeks gestation     - 38 weeks today  - Had prolonged NICU course due to prematuriy   - Patient has follow-up appointment with developmental pediatrics and rehab  -2870 g today, has regained birthweight  -We'll continue to follow closely            Health Encounters    RESOLVED: Encounter for well child check without abnormal findings - Primary       Symptoms and  Signs    Spasm     -Patient noted to have spasms on exam  -Patient reexamined with supervising doctor, Dr. Cordero. All reflexes intact. Likely baby is cold, and is still developing nervous system.  -No nystagmus or red flags noted on exam  -We'll continue to monitor closely, follow-up in 2 weeks             Discussed and examined with attending physician, Dr. Gil Ramos, DO             None

## 2021-01-18 NOTE — OCCUPATIONAL THERAPY INITIAL EVALUATION ADULT - WEIGHT-BEARING RESTRICTIONS: TOILET, REHAB EVAL
[facility] INFECTIOUS DISEASE PROGRESS NOTE    Ramón Pereyra Patient Status:  Inpatient    1928 MRN [id number]   Location [facility] CRITICAL CARE UNIT 3 Attending Jean Claude Edwards MD   Hosp Day # 2 PCP Sha Ambriz MD       ANTIMICROBIAL THERAPY: vanco, cefazolin    History of Present Illness:  Ramón Pereyra is a a(n) 89 year old male. Active gibleed. Now in icu after rrt. D/w rn. 'im having dialysis tmrw. Plus colonoscopy. It's too much! And I refuse to use that thing, what's it called, that bedpan. I can get up to the toilet. Where is it?' claims wrist is NOT better.       Allergies:  ALLERGIES:   Allergen Reactions   • Sulfa Antibiotics Other (See Comments)     Unknown       Medications:  Current Facility-Administered Medications   Medication Dose Route Frequency Provider Last Rate Last Admin   • vancomycin (VANCOCIN) 500 mg in sodium chloride 0.9 % 100 mL IVPB  500 mg Intravenous Once per day on  Jean Claude Edwards MD       • sodium chloride 0.9% infusion   Intravenous Continuous PRN Bre Arboleda CNP       • methylPREDNISolone (SOLU-Medrol) PF injection 125 mg  125 mg Intravenous Daily Bre Arboleda, CNP   125 mg at 21 1058   • electrolyte/PEG 3350 (MOVIPREP) solution 1,000 mL  1,000 mL Oral Q12H Marga Smith PA-C       • pantoprazole (PROTONIX INJECT) injection 40 mg  40 mg Intravenous 2 times per day Marga Smith PA-C       • ondansetron (ZOFRAN ODT) disintegrating tablet 4 mg  4 mg Oral Once Jean Claude Edwards MD       • ceFAZolin (ANCEF) syringe 1,000 mg  1,000 mg Intravenous Daily Luis Fernando Tejeda MD   1,000 mg at 21 1404   • sodium chloride (PF) 0.9 % injection 10 mL  10 mL Intracatheter PRN Onesimo Rain MD       • sodium chloride (NORMAL SALINE) 0.9 % bolus 100-200 mL  100-200 mL Intravenous PRN Onesimo Rain MD       • hydrALAZINE (APRESOLINE) injection 10 mg  10 mg Intravenous Q6H PRN  Onesimo Rain MD   10 mg at 01/18/21 1410   • VANCOMYCIN - PHARMACIST MONITORED   Does not apply See Admin Instructions Jean Claude Edwards MD       • [Held by provider] metoPROLOL tartrate (LOPRESSOR) tablet 12.5 mg  12.5 mg Oral 2 times per day Jean Claude Edwards MD   Stopped at 01/18/21 0900   • [Held by provider] predniSONE (DELTASONE) tablet 30 mg  30 mg Oral Daily Jean Claude Edwards MD   Stopped at 01/18/21 0900   • WARFARIN - PHARMACIST MONITORED   Does not apply See Admin Instructions Jean Claude Edwards MD       • acetaminophen (TYLENOL) tablet 650 mg  650 mg Oral Q6H PRN Jean Claude Edwards MD   650 mg at 01/18/21 0257        Review of Systems:    CONSTITUTIONAL: Denies fever/sweats, weight loss/gain, fatigue.  EYES:  Denies visual blurring, double vision.   ENT/o/p: Denies sinus pain, epistaxis, sore throat.   CV:  Denies chest pain.    RESPIRATORY: Denies cough, SOB.   GI:  Denies abdom pain, distension. Denies vomiting or diarrhea.   : Denies frequency, dysuria, urgency, hesitancy, hematuria.  MSK: Denies  back pain or leg edema.  SKIN:  Denies rash  NEURO:  Denies headaches,   PSYCH: denies agitation.     Physical Exam:    General: No acute distress. Pt is pale.  Vital signs: Blood pressure (!) 144/96, pulse 94, temperature 97.2 °F (36.2 °C), resp. rate (!) 26, height 5' 6\" (1.676 m), weight 51.1 kg (112 lb 10.5 oz), SpO2 100 %.  HEENT: Moist mucous membranes.   Neck: No lymphadenopathy.  No JVD. Supple.   Respiratory: Clear to auscultation bilaterally.  No wheezes. No rhonchi.  Cardiovascular: S1, S2.  Regular rate and rhythm.  No murmurs.  Abdomen: Soft, nontender, nondistended.  Positive bowel sounds.  Musculoskeletal: pt has improved redness and swelling. Improved rom right wrist. Wrinkling of skin. Still has a small area fluctuance dorsal wrist which is not as tender.   Integument: No lesions. No erythema. No open wounds.  Neuro: alert and o x 3.     Laboratory Data:  Recent Labs   Lab  01/18/21  1357 01/18/21  0815 01/18/21  0509 01/17/21  0747 01/16/21  0952   WBC  --   --  9.6 12.4* 11.3*   RBC  --   --  2.88* 3.62* 4.05*   HGB 7.4* 7.4* 8.2* 10.2* 11.5*   HCT 23.5*  --  26.7* 32.7* 36.5*   PLT  --   --  173 177 200     Recent Labs   Lab 01/18/21  0509 01/17/21  0747 01/16/21  0952   SODIUM 139 138 140   CHLORIDE 111* 107 108*   CO2 16* 21 24   * 71* 34*   CREATININE 6.61* 5.33* 3.79*   CALCIUM 7.0* 7.4* 7.4*   ALBUMIN  --  2.5* 2.9*   BILIRUBIN  --  0.6 0.6   ALKPT  --  90 119*   GPT  --  34 47   AST  --  15 23   GLUCOSE 158* 72 106*     Recent Labs   Lab 01/16/21  0952   RESR 19     No results found  No results found    Microbiologic Data:   (this admission)  No results found for: SDES No results found for: CULT     Microbiology Results  (Last 10 results in the past 7 days)    Specimen   Gram Smear   Culture Result   Status       01/16/21  0952         Gram positive cocci in clusters.  Comment:  Detected from anaerobic bottle after 22 hours[P]                Imaging:  LAST ECHO/ECHO STRESS:  No procedure found.    MRI WRIST RIGHT WO CONTRAST   Final Result      1.  Superficial and deep cellulitis of the wrist and hand.      2.  Nearly 3 cm subcutaneous fluid collection just lateral to the 5th   carpometacarpal joint is suspicious for an abscess/ganglion.      3.  High-grade partial tearing and tenosynovitis of the extensor digiti   minimi.       4.  Partial tearing of the 2nd extensor digitorum tendon.      5.  Mild tenosynovitis associated with the 2nd and 3rd extensor digitorum   tendons.      6.  Multifocal severe osteoarthritic and degenerative soft tissue changes   detailed above.      Electronically Signed by: SHIRIN SAUCEDO M.D.    Signed on: 1/18/2021 1:54 PM          XR HAND MIN 3 VIEWS RIGHT   Final Result       As above.         Electronically Signed by: FILEMON CASIANO M.D.    Signed on: 1/16/2021 10:34 AM          US VASC EXTREMITY LOWER DUPLEX ARTERIAL    (Results Pending)        Impression:  Patient Active Problem List   Diagnosis   • Chronic atrial fibrillation (CMS/[facility])   • Carotid stenosis, asymptomatic, bilateral   • Hypertension, benign   • PAD (peripheral artery disease) (CMS/[facility])   • CKD (chronic kidney disease) stage 4, GFR 15-29 ml/min (CMS/[facility])   • Vitamin D deficiency   • Personal history of long-term (current) use of anticoagulants   • Long term (current) use of anticoagulants   • ESRD (end stage renal disease) on dialysis (CMS/[facility])   • Arrhythmia   • Colitis   • Fall at home   • Acute blood loss anemia   • Acute respiratory failure with hypoxia (CMS/[facility])   • Closed right hip fracture (CMS/[facility])   • Moderate protein-calorie malnutrition (CMS/[facility])   • V-tach (CMS/[facility])   • Blood in stool   • Cellulitis of right hand   • Leukocytosis   • MSSA (methicillin susceptible Staphylococcus aureus) infection   • CAD (coronary artery disease)   • Open wound of second toe of left foot   • Crohn's disease (CMS/[facility])     ASSESSMENT:  Right upper extremity cellulitis with possible dorsal hand abscess and possible septic wrist arthritis.  Positive blood culture staph aureus not MRSA.  ESRD. Gi bleed, crohn's dis.    PLAN:  IV cefazolin/vanco. Dc vanco sson 1-2 days as cultures allow.  Repeat blood cultures.  MRI wrist.  May need hand surgery consult. However pt is improved.  Prolonged IV antibiotics which may be able to be given on dialysis.  Do not suspect arterial insufficiency of the right upper extremity. Mri s/o abscess, not intraarticular infection. Clinically improved.  Thank you  Luis Fernando Tejeda MD  [facility], [facility]  [phone]  1/18/2021  3:42 PM   partial weight-bearing/RLE 30%

## 2021-03-24 ENCOUNTER — APPOINTMENT (OUTPATIENT)
Dept: ORTHOPEDIC SURGERY | Facility: CLINIC | Age: 76
End: 2021-03-24
Payer: MEDICARE

## 2021-03-24 DIAGNOSIS — Z96.649 PRESENCE OF UNSPECIFIED ARTIFICIAL HIP JOINT: ICD-10-CM

## 2021-03-24 PROCEDURE — 99072 ADDL SUPL MATRL&STAF TM PHE: CPT

## 2021-03-24 PROCEDURE — 73502 X-RAY EXAM HIP UNI 2-3 VIEWS: CPT | Mod: RT

## 2021-03-24 PROCEDURE — 99214 OFFICE O/P EST MOD 30 MIN: CPT

## 2021-03-24 PROCEDURE — 73552 X-RAY EXAM OF FEMUR 2/>: CPT | Mod: RT

## 2021-03-25 PROBLEM — Z96.649 S/P REVISION OF TOTAL HIP: Status: ACTIVE | Noted: 2019-10-01

## 2021-08-10 NOTE — ED ADULT TRIAGE NOTE - SPO2 (%)
[facility]  PROGRESS NOTE      Patient:  Marilyn Rios Date of Service:  8/10/2021   YOB: 1952 Admission Date:  8/10/2021   MRN:  [id number] Attending:  Pedrito Ovalles MD   PCP:  No Pcp   Hospital Day:  Hospital Day: 1     PRIMARY CARDIOLOGIST:  Dr. Ovalles  Seen by Dr. Carreon at [place]    REASON FOR VISIT: chest pain    This is a 69-year-old female presenting with about 2 weeks history of exertional chest pain.  Prior to admission, she had a stress echocardiogram that showed exercise-induced ischemia anteriorly, anteroseptally, inferoapically suggestive of LAD disease and thus cardiac catheterization has now been recommended.       SUBJECTIVE: denies chest pain or SOB        TELEMETRY: SR      CARDIAC STUDIES:       STRESS ECHO TEST:  Target heart rate was achieved with exercise, performed 4.6METS with severe shortness of breath  Hypertensive response to exercise, peak /123  Normal LV regional wall motion at baseline  Left ventricular ejection fraction decreased with stress with LV cavity dilation post exercise  Abnormal stress echo, cannot exclude ischemia, further evaluation indicated  (specificity of test decreased by severe hypertensive response to exercise)    CARDIAC CATH AND INTERVENTIONS:   Date:  8/10/21    CTA chest PE: 8/9/21  IMPRESSION:   1. No findings of pulmonary embolus.  2. Minor atelectasis. No focal infiltrate or consolidation.      CURRENT MEDICATIONS:   Scheduled:   Potassium Standard Replacement Protocol   Does not apply See Admin Instructions    [START ON 8/11/2021] aspirin  81 mg Oral Daily    atorvastatin  40 mg Oral QHS    guaiFENesin  600 mg Oral 2 times per day    melatonin  6 mg Oral QHS    metoPROLOL tartrate  25 mg Oral 2 times per day    pantoprazole  40 mg Oral Nightly    potassium CHLORIDE  40 mEq Oral Once            PHYSICAL EXAM:     Vitals:    08/10/21 1142   BP: (!) 195/97   Resp: 18   Temp: 98.3 °F (36.8 °C)       WEIGHT  OVER PAST 48 HOURS:  Patient Vitals for the past 48 hrs:   Weight   08/10/21 1142 61.8 kg            INTAKE/OUTAKE:  No intake or output data in the 24 hours ending 08/10/21 1244      General Appearance:  No distress, conversant, appropriate.  Neck:  Spontaneous full range of motion.   Eyes:  Anicteric sclerae.  Mouth:  Moist mucous membranes.  JVP:  not elevated.  Cardiovascular:  S1 S2 with RRR.  Lungs:  clear.  Abdomen:  Soft, nontender, nondistended normal bowel sounds.  Extremities:  Extremities normal, atraumatic, no cyanosis or edema   Skin:  Warm and dry.  MSK:  No visual signs of joint inflammation.   Psych:  Alert, mood appropriate.    LABS:     CARDIAC MARKERS:    Recent Labs   Lab 08/08/21  2138 08/08/21  1552   RAPDTR <0.02 <0.02       BNP:  No results found    CBC:    Recent Labs   Lab 08/08/21  1023   WBC 7.7   HGB 13.8   HCT 40.8          CMP:    Recent Labs   Lab 08/10/21  0537 08/09/21  0637 08/08/21  1026 08/08/21  1023   SODIUM  --   --   --  137   POTASSIUM 3.9 3.9  --  3.7   CHLORIDE  --   --   --  106   CO2  --   --   --  25   BUN  --   --   --  13   CREATININE  --   --  0.60 0.65   GLUCOSE  --   --   --  115*   ALBUMIN  --   --   --  3.8   GPT  --   --   --  37   ALKPT  --   --   --  65   BILIRUBIN  --   --   --  0.5       LIPID PANEL:    Recent Labs   Lab 08/09/21  0637   CHOLESTEROL 252*   TRIGLYCERIDE 255*   HDL 81   CALCLDL 120       HbA1c:    Hemoglobin A1C (%)   Date Value   08/09/2021 5.8 (H)       COAGULATION STUDIES:    Recent Labs   Lab 08/10/21  0537   PT 10.3   INR 0.9       BEST PRACTICE BOX     AMI WITH Heart Failure with Reduced LVEF (<40%)?  no  AMI?  No  Heart Failure with Reduced LVEF (< 40%)?  No    ASSESSMENT/PROBLEM LIST:     1. Abnormal stress echo, angina  2. hyperlipidemia      PLAN:     Cardiac cath today at 1600      Sera OBREGON   [phone]    I saw and evaluated the patient personally. I performed the history, exam and medical decision making for this  encounter and agree with the above note.      97
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
## 2022-05-08 NOTE — DISCHARGE NOTE ADULT - DISCHARGE DATE
03-Jul-2018 normal... Well appearing, awake, alert, oriented to person, place, time/situation and in no apparent distress.

## 2022-08-01 NOTE — DISCHARGE NOTE ADULT - CASE MANAGER'S NAME
To stop a nosebleed:     1. Remain calm. A nosebleed is not usually life-threatening. Sit upright and avoid lying flat.     2. With an index finger, squeeze the bleeding nostril shot by applying steady firm pressure on the side of the lower nose, pushing against the inside middle wall of the nose (septum) for about 15 minutes. For better results, spray Afrin decongestant nasal spray into the bleeding nostril twice, then soak a cotton ball with the Afrin and place it in the bleeding nostril, then pinch it shut for 20 minutes. Afrin and similar sprays are available over the counter. They help by shrinking blood vessels temporarily.     3. If bleeding continues or is severe, or if large amounts of blood are going down the back of the throat,  go to the emergency room. A nasal packing or  may be needed.     Patient instructed to call back after 20 minutes of direct pressure.        NOSEBLEEDS     Most nosebleeds are caused by chapping of the mucous membrane over the surface blood vessels in the front of the nasal cavity. This is often caused by dry air (especially during sleep), or by too much picking or rubbing inside the nose. Occasionally a nosebleed comes from deep inside, and tends to run down the back of the throat. In many cases, medication or dietary supplements can greatly worsen nosebleeds by inhibiting blood clotting. Avoid the following blood thinning medications if possible: Aspirin, ibuprofen (Motrin, Advil, Nuprin), naproxen (Aleve, Naprosyn), ketoprofen (Orudis), and other nonsteroidal anti-inflammatory drugs (NSAIDs). Avoid vitamin E and the supplements ginkgo, garlic, ginseng, nicolle, dong quai, willow bark, primrose, cowslip, red root, and feverfew.  Avoid alcohol.  The prescription drugs Coumadin (Warfarin), Pradaxa, Xarelto, Eliquis and Plavix also make nosebleeds much worse, but check with your doctor before discontinuing them.     After care of nosebleeds to prevent rebleeding: For several days or  more after a nosebleed or cautery, it is important to avoid stress on your nose, and to heal any chapped areas inside. Keep the inside of your nose moist with plenty of salt water (saline) nasal spray. Use several squirts in each nostril at least 4 times daily. Many brands are available at your pharmacy without a prescription. Saline nasal gel (such as AYR brand) applied further into the nose will also help coat and soothe chapped membranes inside the nose. If possible, put a vaporizer in the bedroom for extra moisture at night. Coat the skin inside of each nostril with Vaseline twice daily, especially at bedtime. Use your little finger (not a q-tip, which can irritate). This will soften any crusting and filter out irritating dust. Avoid stooping, bending over, straining, or any strenuous activity that would make you turn red in the face. If you are constipated, try a mild laxative or stool softener to help avoid straining. Avoid hard sniffing or nose blowing. You may gently cleanse the loose buildup inside your nose with the saline nasal spray. If you must sneeze, do so with your mouth open. Avoid picking your nose or placing anything in it, including Q-tips and dry Kleenex. (You may use a moist cotton ball with Afrin as described above for first aid for an active nosebleed.)         Haritha [phone]

## 2022-09-12 NOTE — CONSULT NOTE ADULT - PROBLEM SELECTOR RECOMMENDATION 8
. - suggest lovenox or heparin SQ  - PT post surgery when optimal - suggest continuing medications as prior to transfer (aside from analgesics)  - suggest starting PPI because of CT findings above

## 2022-09-27 NOTE — PROGRESS NOTE ADULT - ASSESSMENT
Patient is currently stable on room air.   Patient is confused and oriented only to person.   Patient has multiple wounds from known fall.   Problem: Adult Inpatient Plan of Care  Goal: Plan of Care Review  Outcome: Ongoing, Progressing  Flowsheets (Taken 9/27/2022 1132)  Plan of Care Reviewed With: patient     Problem: Fluid and Electrolyte Imbalance (Acute Kidney Injury/Impairment)  Goal: Fluid and Electrolyte Balance  Outcome: Ongoing, Progressing  Intervention: Monitor and Manage Fluid and Electrolyte Balance  Flowsheets (Taken 9/27/2022 1132)  Fluid/Electrolyte Management: fluids adjusted     Problem: Skin Injury Risk Increased  Goal: Skin Health and Integrity  Outcome: Ongoing, Progressing  Intervention: Optimize Skin Protection  Flowsheets (Taken 9/27/2022 1132)  Pressure Reduction Techniques: frequent weight shift encouraged  Pressure Reduction Devices: pressure-redistributing mattress utilized  Skin Protection: pectin skin barriers applied  Head of Bed (HOB) Positioning: HOB at 30-45 degrees      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 THR, kika-prosthetic R femur fx (s/p Total Hip revision with ORIF, 6/30/18), recent re-admission (07/16 - 07/24/18) for worsening R hip pain and decreased ability to ambulate now s/p R femur vancouver B1 periprosthetic fracture ORIF (07/19/18) admitted 8/2018 with MRSA bacteremia and hardware collection s/p washout but the hardware was not removed, OR cxs also with MRSA she completed a course of vanco and then bactrim for suppressive therapy but again  presented with hip erythema and edema again the hardware was not removed in hope for femur union, and she received another course of vanco and then doxy and symptoms started after she finished the antibiotics and came back with fever, chills, erythema edema and fluctuation on the hip incision  here afebrile WBC normal  CT with lucency, fractures and cellulitis      hardware infection previously MRSA bacteremia and prosthetic joint infection with cellulitis and ?abscess  s/p hardware removal, purulence found along the entire lateral aspect of the R leg with loose hardware, spacer placement      * f/u the OR cultures   * start vanco q 12 (was discontinued yesterday by primary team)  * check the vanco trough tomorrow  * will need picc line and at least 6 weeks of antibiotics

## 2022-10-10 NOTE — PROGRESS NOTE ADULT - SUBJECTIVE AND OBJECTIVE BOX
ORTHO  Patient is a 73y old  Female who presents with a chief complaint of right hip pain (11 Sep 2019 23:04)    Pt. resting without complaint    VS-  T(C): 36.7 (09-12-19 @ 13:00), Max: 37 (09-11-19 @ 18:11)  HR: 62 (09-12-19 @ 13:00) (57 - 99)  BP: 170/69 (09-12-19 @ 13:00) (148/68 - 206/72)  RR: 16 (09-12-19 @ 13:00) (16 - 18)  SpO2: 95% (09-12-19 @ 13:00) (95% - 99%)  Wt(kg): --    M.S. Alert  Extremity- Right hip min tenderness with ROM  Neuro-              Motor- (+) ankle DF/PF              Sensation- grossly intact to light touch              Calves- soft, nontender                               13.6   6.9   )-----------( 268      ( 11 Sep 2019 15:59 )             41.8     09-11    141  |  102  |  26<H>  ----------------------------<  96  4.1   |  25  |  0.97    Ca    9.0      11 Sep 2019 15:59    TPro  7.8  /  Alb  4.2  /  TBili  0.1<L>  /  DBili  x   /  AST  16  /  ALT  12  /  AlkPhos  138<H>  09-11 Dorsal Nasal Flap Text: The defect edges were debeveled with a #15 scalpel blade.  Given the location of the defect and the proximity to free margins a dorsal nasal flap was deemed most appropriate.  Using a sterile surgical marker, an appropriate dorsal nasal flap was drawn around the defect.    The area thus outlined was incised deep to adipose tissue with a #15 scalpel blade.  The skin margins were undermined to an appropriate distance in all directions utilizing iris scissors.

## 2022-10-31 NOTE — ED PROVIDER NOTE - MUSCULOSKELETAL MINIMAL EXAM
Refill for Medication approved per system protocol and last office note reviewed     RANGE OF MOTION LIMITED/no muscle tenderness/firm bulging at right hip, but pt leaning to other side.  Unclear significance. Skin WNL. Standing on exam, uncomfortable sitting./atraumatic

## 2022-11-02 NOTE — OCCUPATIONAL THERAPY INITIAL EVALUATION ADULT - VISUAL ASSESSMENT: EYE MUSCLE BALANCE
What Type Of Note Output Would You Prefer (Optional)?: Standard Output How Severe Is Your Rash?: moderate Is This A New Presentation, Or A Follow-Up?: Rash normal

## 2023-01-14 NOTE — PATIENT PROFILE ADULT - BRADEN SENSORY
Pt given walker  
Pt verbalized understanding of discharge. Pt left in no distress.  
(4) no impairment

## 2023-02-06 NOTE — ED PROVIDER NOTE - ATTENDING CONTRIBUTION TO CARE
[address] [place] [address] [facility]  YVONNE  Progress Note    SUBJECTIVE:  Chief Complaint   Patient presents with    Altered Mental Status     Patient is tolerating medications. No reported adverse drug reactions. She is laying in bed, resting with warming blanket on   T 92.9 this AM   at bedside     Review of systems:  As stated above in the chief complaint, otherwise negative. Medications:  Scheduled Meds:   enoxaparin  40 mg SubCUTAneous Daily    [Held by provider] benztropine  0.5 mg Oral BID    [Held by provider] mirtazapine  30 mg Oral Nightly    [Held by provider] rivastigmine  3 mg Oral BID    [Held by provider] venlafaxine  75 mg Oral Daily with breakfast    sodium chloride flush  5-40 mL IntraVENous 2 times per day     Continuous Infusions:   dextrose 5% in lactated ringers 100 mL/hr at 23 1048    sodium chloride 999 mL/hr at 23 0811    sodium chloride       PRN Meds:perflutren lipid microspheres, sodium chloride flush, sodium chloride, acetaminophen **OR** acetaminophen, potassium chloride **OR** potassium alternative oral replacement **OR** potassium chloride, magnesium sulfate, senna    OBJECTIVE:  BP (!) 132/92   Pulse 52   Temp (!) 92.9 °F (33.8 °C) (Axillary)   Resp 16   Ht 5' 6\" (1.676 m)   Wt 144 lb 4.8 oz (65.5 kg)   LMP 2011 (Exact Date)   SpO2 98%   BMI 23.29 kg/m²   Temp  Av.9 °F (35.5 °C)  Min: 92.6 °F (33.7 °C)  Max: 98 °F (36.7 °C)  Constitutional: The patient is resting in bed. MILES at bedside. In no distress. Warming blanket on. Skin: Warm and dry. No rashes were noted. HEENT: Round and reactive pupils. Dry mucous membranes. No ulcerations or thrush. Neck: Supple to movements. Chest: No use of accessory muscles to breathe. Symmetrical expansion. No wheezing, crackles or rhonchi. Cardiovascular: S1 and S2 are rhythmic and regular. No murmurs appreciated. Abdomen: Positive bowel sounds to auscultation. Benign to palpation. Extremities: No clubbing, no cyanosis, no edema. Lines: peripheral    Laboratory and Tests Review:  Lab Results   Component Value Date    WBC 4.2 (L) 02/06/2023    WBC 4.6 02/05/2023    WBC 2.7 (L) 02/03/2023    HGB 9.7 (L) 02/06/2023    HCT 28.2 (L) 02/06/2023    MCV 90.1 02/06/2023     02/06/2023     Lab Results   Component Value Date    NEUTROABS 2.95 02/06/2023    NEUTROABS 2.09 02/03/2023    NEUTROABS 3.38 02/03/2023     No results found for: [facility]  Lab Results   Component Value Date    ALT 84 (H) 02/03/2023    AST 60 (H) 02/03/2023    ALKPHOS 103 02/03/2023    BILITOT <0.2 02/03/2023     Lab Results   Component Value Date/Time     02/06/2023 02:30 AM    K 3.2 02/06/2023 02:30 AM    K 3.6 02/04/2023 08:15 AM     02/06/2023 02:30 AM    CO2 25 02/06/2023 02:30 AM    BUN 8 02/06/2023 02:30 AM    CREATININE 0.6 02/06/2023 02:30 AM    CREATININE 0.6 02/05/2023 01:15 PM    CREATININE 0.8 02/04/2023 08:15 AM    GFRAA >60 06/09/2021 12:08 PM    LABGLOM >60 02/06/2023 02:30 AM    GLUCOSE 113 02/06/2023 02:30 AM    GLUCOSE NEGATIVE 07/26/2018 03:25 PM    PROT 6.1 02/03/2023 11:55 PM    LABALBU 3.7 02/03/2023 11:55 PM    LABALBU 4.4 07/26/2018 07:54 AM    CALCIUM 9.9 02/06/2023 02:30 AM    BILITOT <0.2 02/03/2023 11:55 PM    BILITOT NEGATIVE 07/26/2018 03:25 PM    ALKPHOS 103 02/03/2023 11:55 PM    AST 60 02/03/2023 11:55 PM    ALT 84 02/03/2023 11:55 PM     No results found for: CRP  No results found for: [address]  Radiology:      Microbiology:   Blood Culture 2/3/23: GPC in clusters   BCID: Staphylococcus species   RVP: negative     Recent Labs     02/03/23  2355 02/04/23  1236 02/06/23  0230   PROCAL 0.07 0.08 0.03         ASSESSMENT:  Encephalopathy, pinpoint pupils, hypothermia:   Dehydration:   R./o sepsis: no sign of pneumonia, no wounds. No diarrhea. No abdominal or flank tenderness. UA is clean.    All her presentation is likely related to medications (she is on multiple neuro/psych meds)  Staphylococcus species bacteremia - likely contaminant, positive after 24 hours     PLAN:  Continue to follow off antibiotics at this time, supportive care   Monitor labs  ID signs off at this time, please call if needed     IFEANYI Menezes CNP  11:34 AM  2/6/2023     Pt seen and examined. Above discussed agree with advanced practice nurse. Labs, cultures, and radiographs reviewed. Face to Face encounter occurred. Changes made as necessary.      Melany Abernathy MD I have seen and evaluated the patient face to face, endorse the HPI, PE, as edited and/or reviewed by me as needed and have indicated my contribution to care in the MDM section.

## 2023-02-16 ENCOUNTER — APPOINTMENT (OUTPATIENT)
Dept: UROLOGY | Facility: CLINIC | Age: 78
End: 2023-02-16

## 2023-04-11 ENCOUNTER — INPATIENT (INPATIENT)
Facility: HOSPITAL | Age: 78
LOS: 14 days | Discharge: NURSING, REHAB OR LONG-TERM CARE | DRG: 559 | End: 2023-04-26
Attending: HOSPITALIST | Admitting: STUDENT IN AN ORGANIZED HEALTH CARE EDUCATION/TRAINING PROGRAM
Payer: MEDICARE

## 2023-04-11 VITALS
WEIGHT: 110.01 LBS | HEIGHT: 63 IN | HEART RATE: 73 BPM | OXYGEN SATURATION: 96 % | RESPIRATION RATE: 20 BRPM | SYSTOLIC BLOOD PRESSURE: 154 MMHG | DIASTOLIC BLOOD PRESSURE: 58 MMHG | TEMPERATURE: 98 F

## 2023-04-11 DIAGNOSIS — Z98.89 OTHER SPECIFIED POSTPROCEDURAL STATES: Chronic | ICD-10-CM

## 2023-04-11 DIAGNOSIS — J44.1 CHRONIC OBSTRUCTIVE PULMONARY DISEASE WITH (ACUTE) EXACERBATION: ICD-10-CM

## 2023-04-11 DIAGNOSIS — Z96.641 PRESENCE OF RIGHT ARTIFICIAL HIP JOINT: Chronic | ICD-10-CM

## 2023-04-11 LAB
ALBUMIN SERPL ELPH-MCNC: 3.1 G/DL — LOW (ref 3.3–5)
ALP SERPL-CCNC: 92 U/L — SIGNIFICANT CHANGE UP (ref 40–120)
ALT FLD-CCNC: 14 U/L — SIGNIFICANT CHANGE UP (ref 10–45)
ANION GAP SERPL CALC-SCNC: 8 MMOL/L — SIGNIFICANT CHANGE UP (ref 5–17)
APTT BLD: 33.9 SEC — SIGNIFICANT CHANGE UP (ref 27.5–35.5)
AST SERPL-CCNC: 13 U/L — SIGNIFICANT CHANGE UP (ref 10–40)
BASE EXCESS BLDV CALC-SCNC: 5 MMOL/L — HIGH (ref -2–3)
BASOPHILS # BLD AUTO: 0.02 K/UL — SIGNIFICANT CHANGE UP (ref 0–0.2)
BASOPHILS NFR BLD AUTO: 0.4 % — SIGNIFICANT CHANGE UP (ref 0–2)
BILIRUB SERPL-MCNC: 0.3 MG/DL — SIGNIFICANT CHANGE UP (ref 0.2–1.2)
BUN SERPL-MCNC: 28 MG/DL — HIGH (ref 7–23)
CA-I SERPL-SCNC: 1.17 MMOL/L — SIGNIFICANT CHANGE UP (ref 1.15–1.33)
CALCIUM SERPL-MCNC: 8.3 MG/DL — LOW (ref 8.4–10.5)
CHLORIDE BLDV-SCNC: 104 MMOL/L — SIGNIFICANT CHANGE UP (ref 96–108)
CHLORIDE SERPL-SCNC: 101 MMOL/L — SIGNIFICANT CHANGE UP (ref 96–108)
CO2 BLDV-SCNC: 33 MMOL/L — HIGH (ref 22–26)
CO2 SERPL-SCNC: 30 MMOL/L — SIGNIFICANT CHANGE UP (ref 22–31)
CREAT SERPL-MCNC: 0.98 MG/DL — SIGNIFICANT CHANGE UP (ref 0.5–1.3)
CRP SERPL-MCNC: 42 MG/L — HIGH (ref 0–4)
EGFR: 60 ML/MIN/1.73M2 — SIGNIFICANT CHANGE UP
EOSINOPHIL # BLD AUTO: 0.24 K/UL — SIGNIFICANT CHANGE UP (ref 0–0.5)
EOSINOPHIL NFR BLD AUTO: 4.3 % — SIGNIFICANT CHANGE UP (ref 0–6)
GAS PNL BLDV: 137 MMOL/L — SIGNIFICANT CHANGE UP (ref 136–145)
GAS PNL BLDV: SIGNIFICANT CHANGE UP
GLUCOSE BLDV-MCNC: 112 MG/DL — HIGH (ref 70–99)
GLUCOSE SERPL-MCNC: 111 MG/DL — HIGH (ref 70–99)
HCO3 BLDV-SCNC: 31 MMOL/L — HIGH (ref 22–29)
HCT VFR BLD CALC: 30.7 % — LOW (ref 34.5–45)
HCT VFR BLDA CALC: 28 % — LOW (ref 34.5–46.5)
HGB BLD CALC-MCNC: 9.4 G/DL — LOW (ref 11.7–16.1)
HGB BLD-MCNC: 9.4 G/DL — LOW (ref 11.5–15.5)
IMM GRANULOCYTES NFR BLD AUTO: 0.7 % — SIGNIFICANT CHANGE UP (ref 0–0.9)
INR BLD: 1.14 RATIO — SIGNIFICANT CHANGE UP (ref 0.88–1.16)
LACTATE BLDV-MCNC: 1 MMOL/L — SIGNIFICANT CHANGE UP (ref 0.5–2)
LYMPHOCYTES # BLD AUTO: 0.56 K/UL — LOW (ref 1–3.3)
LYMPHOCYTES # BLD AUTO: 10.1 % — LOW (ref 13–44)
MCHC RBC-ENTMCNC: 30.6 GM/DL — LOW (ref 32–36)
MCHC RBC-ENTMCNC: 31.1 PG — SIGNIFICANT CHANGE UP (ref 27–34)
MCV RBC AUTO: 101.7 FL — HIGH (ref 80–100)
MONOCYTES # BLD AUTO: 0.64 K/UL — SIGNIFICANT CHANGE UP (ref 0–0.9)
MONOCYTES NFR BLD AUTO: 11.6 % — SIGNIFICANT CHANGE UP (ref 2–14)
NEUTROPHILS # BLD AUTO: 4.03 K/UL — SIGNIFICANT CHANGE UP (ref 1.8–7.4)
NEUTROPHILS NFR BLD AUTO: 72.9 % — SIGNIFICANT CHANGE UP (ref 43–77)
NRBC # BLD: 0 /100 WBCS — SIGNIFICANT CHANGE UP (ref 0–0)
NT-PROBNP SERPL-SCNC: 3206 PG/ML — HIGH (ref 0–300)
PCO2 BLDV: 54 MMHG — HIGH (ref 39–42)
PH BLDV: 7.37 — SIGNIFICANT CHANGE UP (ref 7.32–7.43)
PLATELET # BLD AUTO: 218 K/UL — SIGNIFICANT CHANGE UP (ref 150–400)
PO2 BLDV: 46 MMHG — HIGH (ref 25–45)
POTASSIUM BLDV-SCNC: 4.7 MMOL/L — SIGNIFICANT CHANGE UP (ref 3.5–5.1)
POTASSIUM SERPL-MCNC: 4.6 MMOL/L — SIGNIFICANT CHANGE UP (ref 3.5–5.3)
POTASSIUM SERPL-SCNC: 4.6 MMOL/L — SIGNIFICANT CHANGE UP (ref 3.5–5.3)
PROT SERPL-MCNC: 6.9 G/DL — SIGNIFICANT CHANGE UP (ref 6–8.3)
PROTHROM AB SERPL-ACNC: 13.3 SEC — SIGNIFICANT CHANGE UP (ref 10.5–13.4)
RAPID RVP RESULT: SIGNIFICANT CHANGE UP
RBC # BLD: 3.02 M/UL — LOW (ref 3.8–5.2)
RBC # FLD: 16.4 % — HIGH (ref 10.3–14.5)
SAO2 % BLDV: 74.8 % — SIGNIFICANT CHANGE UP (ref 67–88)
SARS-COV-2 RNA SPEC QL NAA+PROBE: SIGNIFICANT CHANGE UP
SODIUM SERPL-SCNC: 139 MMOL/L — SIGNIFICANT CHANGE UP (ref 135–145)
TROPONIN T, HIGH SENSITIVITY RESULT: 18 NG/L — SIGNIFICANT CHANGE UP (ref 0–51)
TROPONIN T, HIGH SENSITIVITY RESULT: 19 NG/L — SIGNIFICANT CHANGE UP (ref 0–51)
WBC # BLD: 5.53 K/UL — SIGNIFICANT CHANGE UP (ref 3.8–10.5)
WBC # FLD AUTO: 5.53 K/UL — SIGNIFICANT CHANGE UP (ref 3.8–10.5)

## 2023-04-11 PROCEDURE — 73552 X-RAY EXAM OF FEMUR 2/>: CPT | Mod: 26,RT

## 2023-04-11 PROCEDURE — 71045 X-RAY EXAM CHEST 1 VIEW: CPT | Mod: 26

## 2023-04-11 PROCEDURE — G1004: CPT

## 2023-04-11 PROCEDURE — 76377 3D RENDER W/INTRP POSTPROCES: CPT | Mod: 26

## 2023-04-11 PROCEDURE — 73502 X-RAY EXAM HIP UNI 2-3 VIEWS: CPT | Mod: 26,RT

## 2023-04-11 PROCEDURE — 73700 CT LOWER EXTREMITY W/O DYE: CPT | Mod: 26,RT,ME

## 2023-04-11 PROCEDURE — 99285 EMERGENCY DEPT VISIT HI MDM: CPT

## 2023-04-11 RX ORDER — FUROSEMIDE 40 MG
40 TABLET ORAL ONCE
Refills: 0 | Status: COMPLETED | OUTPATIENT
Start: 2023-04-11 | End: 2023-04-11

## 2023-04-11 RX ORDER — FENTANYL CITRATE 50 UG/ML
25 INJECTION INTRAVENOUS ONCE
Refills: 0 | Status: DISCONTINUED | OUTPATIENT
Start: 2023-04-11 | End: 2023-04-11

## 2023-04-11 RX ORDER — IPRATROPIUM/ALBUTEROL SULFATE 18-103MCG
3 AEROSOL WITH ADAPTER (GRAM) INHALATION ONCE
Refills: 0 | Status: COMPLETED | OUTPATIENT
Start: 2023-04-11 | End: 2023-04-11

## 2023-04-11 RX ADMIN — FENTANYL CITRATE 25 MICROGRAM(S): 50 INJECTION INTRAVENOUS at 20:51

## 2023-04-11 RX ADMIN — Medication 40 MILLIGRAM(S): at 23:55

## 2023-04-11 RX ADMIN — Medication 3 MILLILITER(S): at 21:06

## 2023-04-11 NOTE — ED PROVIDER NOTE - OBJECTIVE STATEMENT
77-year-old female history of hypertension hyperlipidemia right hip replacement approximately 2 years ago.  Patient notes she has had her right hip infected in the past and over the past month she has been having increasing pain.  Patient was sent in by the [facility] in route was recently in [facility].  She was in [facility] 2 weeks ago.  She has a PICC line in place in the right upper extremity was told she has MRSA unclear as to if it is in the blood.  Patient does not know much details about other than that she has been getting vancomycin.  Patient reports otherwise no fever  Patient also notes that she is not on any home oxygen is not complaining of any chest pain or shortness of breath.

## 2023-04-11 NOTE — ED PROVIDER NOTE - WR ORDER NAME 3
General: no fever, chills, confusion  Cardiac: no chest pain, chest tightness, palpitations  Lungs: no sob, difficulty breathing  Abdomen: no abdominal pain, nausea, vomiting, diarrhea, constipation, nml BM  : no dysuria, urinary frequency/urgency  Leg: insect bite    All other systems negative except as per HPI Xray Pelvis AP only

## 2023-04-11 NOTE — ED ADULT NURSE NOTE - OBJECTIVE STATEMENT
77 year old female with PMH HTN, HLD, COPD, right hip replacement surgery in 2021 comes to the ED via EMS for right hip pain near scar where replacement surgery was performed. Pt endorses throughout the month, pain, redness and swelling has been increasing, limiting her ADLs in rehab facility. Recently was admitted at OSH for cellulitis and discharged to rehab facility with right PICC line in place for IV vancomycin therapy. Pt is a/ox4, VSS, sensory in tact, speaking coherently, follows commands and c/o 5/10 pain on the pain scale. Upon exam, redness, heat to palpation and tenderness to touch noted to the right hip area. Pt endorses pain with exertion. Pt  denies CP/SOB, f/c, n/v/d, dizziness/lightheadedness, numbness/tingling/weakness at this time.

## 2023-04-11 NOTE — ED ADULT NURSE NOTE - FINAL NURSING ELECTRONIC SIGNATURE
Chief Complaint  pre-op      History of Present Illness  Pre-Op Visit (Brief): The patient is being seen for a preoperative visit  Surgical Risk Assessment:   Prior Anesthesia: He had prior anesthesia and no prior adverse reaction to general anesthesia  Pertinent Past Medical History: diabetes, but no CAD, CAD without prior MI, CAD without recent PCI, no CHF, no chronic liver disease, no acute hepatitis, no pulmonary embolism, no DVT, does not use anticoagulants, does not use insulin and no CVA (near syncope with head motion)  HPI: Patient is a pleasant 67 yo M presenting for pre-op evaluation for eyelid surgery of both eyes for drooping eye lids  Surgery is not scheduled yet, consultation appt in 1 week  5 years ago went through similar process but had issues with insurance  Saw Dr Carley Polo Ophthalmologist - suggested seeing another eye doctor, who he is seeing in a week (Dr Mario Vidal)  Patient notes 2 year h/o near-syncopal episodes with turning quickly side to side  Last time occurred 1 month ago  Turns quickly and then feels like he would pass out; feels himself go down  Unsure if he lost consciousness  Not previously evaluate  Patient also has a h/o diabetes, last A1C 9 4 May, 2016  Does not take any medications except for ASA 81mg every couple days for HA  Does not feel Diabetes is "real" and that he can manage it with adjusting diet  Review of Systems    Constitutional: no fever and no chills  Cardiovascular: no chest pain and no palpitations  Respiratory: no shortness of breath, no cough, no wheezing and no shortness of breath during exertion  Gastrointestinal: no abdominal pain, no nausea, no vomiting, no constipation and no diarrhea  Musculoskeletal: as noted in HPI  Integumentary: no rashes and no skin lesions  Neurological: fainting, but as noted in HPI, no headache, no numbness and no difficulty walking  ROS reviewed  Active Problems     1   Bilateral edema of lower extremity (782 3) (R60 0)   2  Candidal intertrigo (112 3) (B37 2)   3  Diabetes mellitus, type 2 (250 00) (E11 9)   4  Facial rash (782 1) (R21)   5  Fatigue (780 79) (R53 83)   6  Hyperlipidemia (272 4) (E78 5)   7  Microalbuminuria (791 0) (R80 9)   8  Noncompliance with treatment (V15 81) (Z91 19)   9  Numbness and tingling (782 0) (R20 0,R20 2)   10  Preventive measure (V07 9) (Z29 9)   11  Screen for colon cancer (V76 51) (Z12 11)   12  Screening, lipid (V77 91) (Z13 220)   13  Sinus congestion (478 19) (R09 81)   14  Thyroid nodule (241 0) (E04 1)   15  Urine frequency (788 41) (R35 0)    Vitamin D deficiency (268 9) (E55 9)          Past Medical History    The active problems and past medical history were reviewed and updated today  Surgical History    The surgical history was reviewed and updated today  Family History    · Family history of cerebrovascular accident (CVA) (V17 1) (Z82 3)    The family history was reviewed and updated today  Social History    · Non-smoker (V49 89) (Z78 9)  The social history was reviewed and updated today  Current Meds   1  Aspirin Low Dose 81 MG TABS; Take 1 tablet daily; Therapy: 83HZG1339 to (Evaluate:56Lrw5641)  Requested for: 11FFL0174; Last   Rx:58Lva4123 Ordered   2  Atorvastatin Calcium 40 MG Oral Tablet; TAKE 1 TABLET DAILY AS DIRECTED; Therapy: 17WLT6003 to (Evaluate:80Kgv7797)  Requested for: 08Apr2016; Last   Rx:53Bjw3829 Ordered   3  Glimepiride 2 MG Oral Tablet; TAKE 1 TABLET DAILY WITH BREAKFAST; Therapy: 42DVH3007 to (Evaluate:19Jun2016)  Requested for: 38JAY7295; Last   Rx:85Per9668 Ordered   4  Januvia 50 MG Oral Tablet; TAKE 1 TABLET DAILY; Therapy: 33BJI7239 to (Evaluate:12Jun2016)  Requested for: 74Xdi3503; Last   Rx:13Apr2016 Ordered   5  Lisinopril 2 5 MG Oral Tablet; TAKE 1 TABLET DAILY AS DIRECTED; Therapy: 41MHE9879 to (Evaluate:59Nbb3504)  Requested for: 08Apr2016; Last   Rx:08Apr2016 Ordered   6   MetFORMIN HCl - 1000 MG Oral Tablet; TAKE 1 TABLET EVERY 12 HOURS DAILY; Therapy: 49ZLV4234 to (Evaluate:42Mjk9152)  Requested for: 13FSZ1025; Last   Rx:37Cdc5948 Ordered   7  Saline Nasal Spray 0 65 % Nasal Solution; USE 2 SPRAYS IN EACH NOSTRIL TWICE   DAILY; Therapy: 03Ssc7391 to (Last Rx:56Wou8474)  Requested for: 60Swt7941 Ordered   8  Vitamin D (Ergocalciferol) 75699 UNIT Oral Capsule; TAKE 1 CAPSULE WEEKLY; Therapy: 28NKK7367 to (Evaluate:83Lkh6638)  Requested for: 37PUN1326; Last   Rx:42Oge8429 Ordered    The medication list was reviewed and updated today  Allergies    1  Tylenol    Vitals  Signs    Temperature: 98 1 F, Tympanic  Heart Rate: 88  Respiration: 14  Systolic: 325, LUE, Sitting  Diastolic: 72, LUE, Sitting  Height: 5 ft 8 in  Weight: 163 lb   BMI Calculated: 24 78  BSA Calculated: 1 87  Pain Scale: 0    Physical Exam    Constitutional   General appearance: No acute distress, well appearing and well nourished  Head and Face   Head and face: Normal     Eyes   Conjunctiva and lids: No erythema, swelling or discharge  Ears, Nose, Mouth, and Throat   External inspection of ears and nose: Normal     Otoscopic examination: Tympanic membranes translucent with normal light reflex  Canals patent without erythema  Oropharynx: Normal with no erythema, edema, exudate or lesions  Pulmonary   Respiratory effort: No increased work of breathing or signs of respiratory distress  Auscultation of lungs: Clear to auscultation  Cardiovascular   Auscultation of heart: Normal rate and rhythm, normal S1 and S2, no murmurs  Peripheral vascular exam: Normal     Abdomen   Abdomen: Non-tender, no masses  Musculoskeletal   Gait and station: Normal     Psychiatric   Judgment and insight: Normal     Orientation to person, place and time: Normal     Recent and remote memory: Intact  Mood and affect: Normal        Assessment    1  Ptosis of both eyelids (374 30) (H02 403)   2   Diabetes mellitus, type 2 (250 00) (E11 9)   3  Near syncope (780 2) (R55)   4  Vitamin D deficiency (268 9) (E55 9)    Plan   Diabetes mellitus, type 2    · Glimepiride 2 MG Oral Tablet   · Januvia 50 MG Oral Tablet   · MetFORMIN HCl - 500 MG Oral Tablet; Take 1 tablet once a day for 3-4 days, then  1 tablet twice daily for 3-4 days, then 2 tablets twice a day  Diabetes mellitus, type 2, Microalbuminuria    · Lisinopril 2 5 MG Oral Tablet  Hyperlipidemia    · Atorvastatin Calcium 40 MG Oral Tablet  Near syncope    · * XR SPINE CERVICAL 2 OR 3 VW INJURY; Status:Active; Requested ZLM:14STJ9118;    · VAS CAROTID COMPLETE STUDY; SIDE:Bilateral; Status:Hold For - Scheduling;  Requested XST:33YKK3997;   Ptosis of both eyelids    · Ophthalmology Referral Other Co-Management  *  Status: Hold For - Scheduling   Requested for: 59DJI1698  are Referring to a non- Preferred Provider : Services not provided in network  Care Summary provided  : Yes  Sinus congestion    · Saline Nasal Spray 0 65 % Nasal Solution  Vitamin D deficiency    · Vitamin D (Ergocalciferol) 42333 UNIT Oral Capsule    (1) CBC/PLT/DIFF; Status:Resulted - Requires Verification;   Done: 18WUQ9804 12:00AM  PMW:04TFX0723; Ordered; For:Diabetes mellitus, type 2, Vitamin D deficiency; Ordered By:Tierra Ramirez;   (1) COMPREHENSIVE METABOLIC PANEL; Status:Resulted - Requires Verification;   Done: 72YBN4598 12:00AM  YCV:14MDE7256; Ordered; For:Diabetes mellitus, type 2, Vitamin D deficiency; Ordered By:Tierra Ramirez;   (1) LIPID PANEL, FASTING; Status:Resulted - Requires Verification;   Done: 19FWW6301 12:00AM  VVW:12FOV4467; Ordered; For:Diabetes mellitus, type 2, Vitamin D deficiency; Ordered By:Tierra Ramirez;   (1) VITAMIN D 25-HYDROXY; Status:Active; Requested OWH:58YHO7684;   Perform:[facility]; OMF:43PHO0320; Last Updated By:Tierra Ramirez; 10/16/2017 9:15:35 AM;Ordered; For:Diabetes mellitus, type 2, Vitamin D deficiency;  Ordered By:Tierra Ramirez;   Hemoglobin A1c- POC; Status:Resulted - Requires Verification;   Done: 68ILT4351 12:00AM  VLB:91JKM5302; Ordered; For:Diabetes mellitus, type 2; Ordered By:Tierra Ramirez;   Vitamin D deficiency (541 9) (G67 9)          Discussion/Summary  Surgical Clearance: Roshan Cruz is not cleared from a cardiac standpoint to proceed with surgery  Comments:  further evaluation needed for near syncopal episodes  1  Ptosis of both eyelids  -referral to Ophthalmology as requested for specialty evaluation  -follow their recs, repeat pre-op clearance after below completed  Not cleared at this time  2  Near-syncope  -ddx includes atlanto-axis instability, vetebral compromise, vasovagal syncope, inner ear imbalance  -cervical Xrays ordered in addition to venous carotid dopplers to further evaluate    3  Diabetes  -poorly compliant and poorly controlled  -POC A1C today 10 7    -extensively counseled importance in controlling diabetes including reducing risk of heart attack, stroke, and further sequelae including blindness, amputation, neuropathy, and kidney failure    -restart Metformin - will titrate up to 1000mg BID  Start 500mg daily for 3-4 days, then take twice a day for 3-4 days, then take 1000mg twice a day  -follow up in 1 month to reassess   -CMP, FLP, CBC, vitamin D ordered    4  Vitamin D deficiency  -due for recheck, vitamin D ordered  The patient was counseled regarding instructions for management, risk factor reductions, patient and family education, impressions, risks and benefits of treatment options, importance of compliance with treatment  Possible side effects of new medications were reviewed with the patient/guardian today  The treatment plan was reviewed with the patient/guardian  The patient/guardian understands and agrees with the treatment plan     Self Referrals: No      End of Encounter Meds    1  MetFORMIN HCl - 500 MG Oral Tablet;  Take 1 tablet once a day for 3-4 days, then 1 tablet   twice daily for 3-4 days, then 2 tablets twice a day; Therapy: 24YKF5064 to (22 828678)  Requested for: 17FZD5195; Last   Rx:09Oct2017 Ordered    2  Aspirin Low Dose 81 MG TABS; Take 1 tablet daily; Therapy: 92FQD9746 to (Evaluate:89Fuy9835)  Requested for: 59GEF5007; Last   Rx:90Ubi7190 Ordered    3  Vitamin D (Ergocalciferol) 13882 UNIT Oral Capsule; TAKE 1 CAPSULE WEEKLY;    Therapy: 65CZB8573 to (Evaluate:87Hhe8981)  Requested for: 81MKK7833; Last   Rx:13Oct2017 Ordered    Signatures   Electronically signed by : Uli Martin DO; Oct  9 2017  4:37PM EST                       (Author)    Electronically signed by : Shameka Butcher DO; Oct 16 2017  2:29PM EST                       (Author) 12-Apr-2023 01:22

## 2023-04-11 NOTE — ED ADULT NURSE NOTE - NSIMPLEMENTINTERV_GEN_ALL_ED
Implemented All Fall Risk Interventions:  [place] to call system. Call bell, personal items and telephone within reach. Instruct patient to call for assistance. Room bathroom lighting operational. Non-slip footwear when patient is off stretcher. Physically safe environment: no spills, clutter or unnecessary equipment. Stretcher in lowest position, wheels locked, appropriate side rails in place. Provide visual cue, wrist band, yellow gown, etc. Monitor gait and stability. Monitor for mental status changes and reorient to person, place, and time. Review medications for side effects contributing to fall risk. Reinforce activity limits and safety measures with patient and family.

## 2023-04-11 NOTE — CONSULT NOTE ADULT - ASSESSMENT
ASSESSMENT & PLAN  77yFemale s/p THR (c/b multiple infections and revisions) w/ R hip pain for 1 month. ESR pending/CRP 42. Unknown medical workup completed at [facility]. Due to the complex nature of previous hospital courses and current COPD exacerbation, no acute surgical orthopedic intervention is needed at this time.    - Continue abx  - WBAT RLE  - f/u ESR   - obtain medical records from [facility]  - FU CT R femur   - Consider MRI of R hip  - no acute ortho surgery at this time  - pain control  - ice/cold compress  - will follow closely    For all questions related to patient care, please reach out to the on-call team via the pager.     Orthopaedic Surgery  [facility] [phone]  [facility] [phone]  [facility] [phone]     ASSESSMENT & PLAN  77yFemale s/p THR (c/b multiple infections and revisions) w/ R hip pain for 1 month. ESR pending/CRP 42. Unknown medical workup completed at [facility]. Due to the complex nature of previous hospital courses and current COPD exacerbation, no acute surgical orthopedic intervention is needed at this time. Any subsequent intervention would require extensive pre-operative planning and counseling with patient    - Continue abx  - WBAT RLE  - f/u ESR   - obtain medical records from [facility]  - Consider IR consult for aspiration of R hip if hip was not aspirated at [facility]  - no acute ortho surgery at this time  - pain control  - ice/cold compress  - will follow closely    For all questions related to patient care, please reach out to the on-call team via the pager.     Orthopaedic Surgery  [facility] [phone]  [facility] [place] [phone]  [facility] [phone]

## 2023-04-11 NOTE — ED PROVIDER NOTE - ATTENDING CONTRIBUTION TO CARE
Patient with signs and symptoms of persistent hip pain.  Unsure of prior history at [facility] as patient is a poor historian.  Patient without ancillary history about temporal factors of her presentation.  Patient only complains of pain at this time.  Patient was noted to be hypoxic upon arrival to 90%, and does not take home oxygen.  No reported fevers or chills.  Patient dry mucous membranes, speaking in full sentences, trachea midline, bilateral breath sounds, lungs are clear without crackles or wheezes appreciated after albuterol.  Patient without tachycardia or tachypnea.  No warmth or elicited tenderness to palpation of right hip when patient is distracted.  New Tsai MD, FACEP: In this physician's medical judgement based on clinical history and physical exam the patient's signs and symptoms lead to differential diagnoses which includes but is not limited to: Acute on chronic hip pain, occult joint infection with hardware, periprosthetic fracture,    Historical features, symptoms, and clinical exam not consistent with: Sepsis    Labs were ordered and independently reviewed by me.  EKG was ordered and independently reviewed by me.  Imaging was ordered and reviewed by me.    Appropriate medications for the patient's presenting complaints were ordered, and effects were reassessed.    Escalation to admission/observation was considered.    Will follow up on labs, therapeutics, imaging, reassess and disposition as clinically indicated.  *The above represents an initial assessment/impression. Please refer to my progress notes below for potential changes in patient clinical course*    Patient endorsed to Dr. Solomon at the time of admission. Based on patient's history and physical exam, as well as the results of today's workup, I feel that patient warrants admission to the hospital for further workup/evaluation and continued management. I discussed the findings of today's workup with the patient and addressed the patient's questions and concerns. The patient was agreeable with admission. Our team spoke with the inpatient receiving team who accepted the patient for admission and subsequently took over the patient's care at the time of admission. The receiving team will follow up on pending labs, analgesia, any clinical imaging results, ancillary findings, reassess, and disposition as clinically indicated. Details of patient and plan conveyed to receiving physician team and conveyed back for understanding. There were no questions at this time about the patient's status, disposition, and plan. Patient's care to be taken over by receiving physician team at this time, all decisions regarding the progression of care will be made at their discretion.

## 2023-04-11 NOTE — ED PROVIDER NOTE - CLINICAL SUMMARY MEDICAL DECISION MAKING FREE TEXT BOX
77-year-old female chief complaint right hip pain worsening in nature over the past month.  Given history and physical clinical concern for possible infection given patient has hypoxia on room air concern for possible CHF or COPD exacerbation.  Will assess with labs imaging to be admitted

## 2023-04-11 NOTE — ED PROVIDER NOTE - PHYSICAL EXAMINATION
GENERAL: Awake, alert, NAD  HEENT: NC/AT, moist mucous membranes, PERRL, EOMI  LUNGS: CTAB, no wheezes or crackles Currently on 2 L nasal cannula desats to 90% on room air.  Fine wheezing throughout bilateral lung fields.  CARDIAC: RRR, no m/r/g  ABDOMEN: Soft, non tender, non distended, no rebound, no guarding  EXT: 1+ pitting edema b/l , no calf tenderness, 2+ DP pulses bilaterally, no deformities mild erythema of lower extremities more specifically right lower leg .Right hip warm no obvious erythema  NEURO: A&Ox3. Moving all extremities.  SKIN: Warm and dry. No rash.  PSYCH: Normal affect.

## 2023-04-12 DIAGNOSIS — M86.60 OTHER CHRONIC OSTEOMYELITIS, UNSPECIFIED SITE: ICD-10-CM

## 2023-04-12 DIAGNOSIS — R78.81 BACTEREMIA: ICD-10-CM

## 2023-04-12 DIAGNOSIS — F41.9 ANXIETY DISORDER, UNSPECIFIED: ICD-10-CM

## 2023-04-12 DIAGNOSIS — Z29.9 ENCOUNTER FOR PROPHYLACTIC MEASURES, UNSPECIFIED: ICD-10-CM

## 2023-04-12 DIAGNOSIS — I50.9 HEART FAILURE, UNSPECIFIED: ICD-10-CM

## 2023-04-12 DIAGNOSIS — Z98.890 OTHER SPECIFIED POSTPROCEDURAL STATES: Chronic | ICD-10-CM

## 2023-04-12 DIAGNOSIS — J44.1 CHRONIC OBSTRUCTIVE PULMONARY DISEASE WITH (ACUTE) EXACERBATION: ICD-10-CM

## 2023-04-12 LAB
ANION GAP SERPL CALC-SCNC: 8 MMOL/L — SIGNIFICANT CHANGE UP (ref 5–17)
BASOPHILS # BLD AUTO: 0.03 K/UL — SIGNIFICANT CHANGE UP (ref 0–0.2)
BASOPHILS NFR BLD AUTO: 0.5 % — SIGNIFICANT CHANGE UP (ref 0–2)
BUN SERPL-MCNC: 25 MG/DL — HIGH (ref 7–23)
CALCIUM SERPL-MCNC: 8.6 MG/DL — SIGNIFICANT CHANGE UP (ref 8.4–10.5)
CHLORIDE SERPL-SCNC: 97 MMOL/L — SIGNIFICANT CHANGE UP (ref 96–108)
CO2 SERPL-SCNC: 32 MMOL/L — HIGH (ref 22–31)
CREAT SERPL-MCNC: 0.75 MG/DL — SIGNIFICANT CHANGE UP (ref 0.5–1.3)
EGFR: 83 ML/MIN/1.73M2 — SIGNIFICANT CHANGE UP
EOSINOPHIL # BLD AUTO: 0.17 K/UL — SIGNIFICANT CHANGE UP (ref 0–0.5)
EOSINOPHIL NFR BLD AUTO: 2.6 % — SIGNIFICANT CHANGE UP (ref 0–6)
ERYTHROCYTE [SEDIMENTATION RATE] IN BLOOD: 70 MM/HR — HIGH (ref 0–20)
GLUCOSE BLDC GLUCOMTR-MCNC: 91 MG/DL — SIGNIFICANT CHANGE UP (ref 70–99)
GLUCOSE SERPL-MCNC: 149 MG/DL — HIGH (ref 70–99)
HCT VFR BLD CALC: 32.7 % — LOW (ref 34.5–45)
HGB BLD-MCNC: 10.1 G/DL — LOW (ref 11.5–15.5)
IMM GRANULOCYTES NFR BLD AUTO: 0.8 % — SIGNIFICANT CHANGE UP (ref 0–0.9)
LYMPHOCYTES # BLD AUTO: 0.49 K/UL — LOW (ref 1–3.3)
LYMPHOCYTES # BLD AUTO: 7.6 % — LOW (ref 13–44)
MCHC RBC-ENTMCNC: 30.9 GM/DL — LOW (ref 32–36)
MCHC RBC-ENTMCNC: 31.6 PG — SIGNIFICANT CHANGE UP (ref 27–34)
MCV RBC AUTO: 102.2 FL — HIGH (ref 80–100)
MONOCYTES # BLD AUTO: 0.61 K/UL — SIGNIFICANT CHANGE UP (ref 0–0.9)
MONOCYTES NFR BLD AUTO: 9.5 % — SIGNIFICANT CHANGE UP (ref 2–14)
MRSA PCR RESULT.: SIGNIFICANT CHANGE UP
NEUTROPHILS # BLD AUTO: 5.1 K/UL — SIGNIFICANT CHANGE UP (ref 1.8–7.4)
NEUTROPHILS NFR BLD AUTO: 79 % — HIGH (ref 43–77)
NRBC # BLD: 0 /100 WBCS — SIGNIFICANT CHANGE UP (ref 0–0)
PLATELET # BLD AUTO: 222 K/UL — SIGNIFICANT CHANGE UP (ref 150–400)
POTASSIUM SERPL-MCNC: 4.4 MMOL/L — SIGNIFICANT CHANGE UP (ref 3.5–5.3)
POTASSIUM SERPL-SCNC: 4.4 MMOL/L — SIGNIFICANT CHANGE UP (ref 3.5–5.3)
RBC # BLD: 3.2 M/UL — LOW (ref 3.8–5.2)
RBC # FLD: 16.5 % — HIGH (ref 10.3–14.5)
S AUREUS DNA NOSE QL NAA+PROBE: SIGNIFICANT CHANGE UP
SODIUM SERPL-SCNC: 137 MMOL/L — SIGNIFICANT CHANGE UP (ref 135–145)
VANCOMYCIN TROUGH SERPL-MCNC: 15.1 UG/ML — SIGNIFICANT CHANGE UP (ref 10–20)
WBC # BLD: 6.45 K/UL — SIGNIFICANT CHANGE UP (ref 3.8–10.5)
WBC # FLD AUTO: 6.45 K/UL — SIGNIFICANT CHANGE UP (ref 3.8–10.5)

## 2023-04-12 PROCEDURE — 99222 1ST HOSP IP/OBS MODERATE 55: CPT

## 2023-04-12 PROCEDURE — 99223 1ST HOSP IP/OBS HIGH 75: CPT

## 2023-04-12 PROCEDURE — 99221 1ST HOSP IP/OBS SF/LOW 40: CPT

## 2023-04-12 RX ORDER — NIFEDIPINE 30 MG
60 TABLET, EXTENDED RELEASE 24 HR ORAL DAILY
Refills: 0 | Status: DISCONTINUED | OUTPATIENT
Start: 2023-04-12 | End: 2023-04-19

## 2023-04-12 RX ORDER — IPRATROPIUM/ALBUTEROL SULFATE 18-103MCG
3 AEROSOL WITH ADAPTER (GRAM) INHALATION EVERY 6 HOURS
Refills: 0 | Status: DISCONTINUED | OUTPATIENT
Start: 2023-04-12 | End: 2023-04-19

## 2023-04-12 RX ORDER — LIDOCAINE 4 G/100G
1 CREAM TOPICAL DAILY
Refills: 0 | Status: DISCONTINUED | OUTPATIENT
Start: 2023-04-12 | End: 2023-04-19

## 2023-04-12 RX ORDER — MORPHINE SULFATE 50 MG/1
2 CAPSULE, EXTENDED RELEASE ORAL EVERY 4 HOURS
Refills: 0 | Status: DISCONTINUED | OUTPATIENT
Start: 2023-04-12 | End: 2023-04-12

## 2023-04-12 RX ORDER — MORPHINE SULFATE 50 MG/1
2 CAPSULE, EXTENDED RELEASE ORAL ONCE
Refills: 0 | Status: DISCONTINUED | OUTPATIENT
Start: 2023-04-12 | End: 2023-04-12

## 2023-04-12 RX ORDER — HEPARIN SODIUM 5000 [USP'U]/ML
5000 INJECTION INTRAVENOUS; SUBCUTANEOUS EVERY 12 HOURS
Refills: 0 | Status: DISCONTINUED | OUTPATIENT
Start: 2023-04-12 | End: 2023-04-19

## 2023-04-12 RX ORDER — LOSARTAN POTASSIUM 100 MG/1
100 TABLET, FILM COATED ORAL DAILY
Refills: 0 | Status: DISCONTINUED | OUTPATIENT
Start: 2023-04-12 | End: 2023-04-19

## 2023-04-12 RX ORDER — FUROSEMIDE 40 MG
20 TABLET ORAL DAILY
Refills: 0 | Status: DISCONTINUED | OUTPATIENT
Start: 2023-04-12 | End: 2023-04-16

## 2023-04-12 RX ORDER — HYDRALAZINE HCL 50 MG
100 TABLET ORAL EVERY 8 HOURS
Refills: 0 | Status: DISCONTINUED | OUTPATIENT
Start: 2023-04-12 | End: 2023-04-19

## 2023-04-12 RX ORDER — CLONAZEPAM 1 MG
0.5 TABLET ORAL
Refills: 0 | Status: DISCONTINUED | OUTPATIENT
Start: 2023-04-12 | End: 2023-04-18

## 2023-04-12 RX ORDER — GABAPENTIN 400 MG/1
400 CAPSULE ORAL THREE TIMES A DAY
Refills: 0 | Status: DISCONTINUED | OUTPATIENT
Start: 2023-04-12 | End: 2023-04-19

## 2023-04-12 RX ORDER — ACETAMINOPHEN 500 MG
650 TABLET ORAL EVERY 6 HOURS
Refills: 0 | Status: DISCONTINUED | OUTPATIENT
Start: 2023-04-12 | End: 2023-04-18

## 2023-04-12 RX ORDER — VANCOMYCIN HCL 1 G
750 VIAL (EA) INTRAVENOUS EVERY 12 HOURS
Refills: 0 | Status: DISCONTINUED | OUTPATIENT
Start: 2023-04-12 | End: 2023-04-14

## 2023-04-12 RX ORDER — ZOLPIDEM TARTRATE 10 MG/1
5 TABLET ORAL AT BEDTIME
Refills: 0 | Status: DISCONTINUED | OUTPATIENT
Start: 2023-04-12 | End: 2023-04-18

## 2023-04-12 RX ORDER — HYDROMORPHONE HYDROCHLORIDE 2 MG/ML
2 INJECTION INTRAMUSCULAR; INTRAVENOUS; SUBCUTANEOUS EVERY 8 HOURS
Refills: 0 | Status: DISCONTINUED | OUTPATIENT
Start: 2023-04-12 | End: 2023-04-13

## 2023-04-12 RX ADMIN — Medication 250 MILLIGRAM(S): at 06:36

## 2023-04-12 RX ADMIN — LIDOCAINE 1 PATCH: 4 CREAM TOPICAL at 18:21

## 2023-04-12 RX ADMIN — Medication 100 MILLIGRAM(S): at 22:37

## 2023-04-12 RX ADMIN — Medication 3 MILLILITER(S): at 17:37

## 2023-04-12 RX ADMIN — HEPARIN SODIUM 5000 UNIT(S): 5000 INJECTION INTRAVENOUS; SUBCUTANEOUS at 06:27

## 2023-04-12 RX ADMIN — Medication 60 MILLIGRAM(S): at 06:26

## 2023-04-12 RX ADMIN — HYDROMORPHONE HYDROCHLORIDE 2 MILLIGRAM(S): 2 INJECTION INTRAMUSCULAR; INTRAVENOUS; SUBCUTANEOUS at 17:37

## 2023-04-12 RX ADMIN — Medication 0.5 MILLIGRAM(S): at 22:37

## 2023-04-12 RX ADMIN — Medication 100 MILLIGRAM(S): at 13:41

## 2023-04-12 RX ADMIN — Medication 1 TABLET(S): at 10:47

## 2023-04-12 RX ADMIN — MORPHINE SULFATE 2 MILLIGRAM(S): 50 CAPSULE, EXTENDED RELEASE ORAL at 03:20

## 2023-04-12 RX ADMIN — MORPHINE SULFATE 2 MILLIGRAM(S): 50 CAPSULE, EXTENDED RELEASE ORAL at 12:43

## 2023-04-12 RX ADMIN — MORPHINE SULFATE 2 MILLIGRAM(S): 50 CAPSULE, EXTENDED RELEASE ORAL at 12:28

## 2023-04-12 RX ADMIN — GABAPENTIN 400 MILLIGRAM(S): 400 CAPSULE ORAL at 06:26

## 2023-04-12 RX ADMIN — Medication 3 MILLILITER(S): at 10:47

## 2023-04-12 RX ADMIN — LIDOCAINE 1 PATCH: 4 CREAM TOPICAL at 20:30

## 2023-04-12 RX ADMIN — MORPHINE SULFATE 2 MILLIGRAM(S): 50 CAPSULE, EXTENDED RELEASE ORAL at 08:25

## 2023-04-12 RX ADMIN — Medication 3 MILLILITER(S): at 06:27

## 2023-04-12 RX ADMIN — MORPHINE SULFATE 2 MILLIGRAM(S): 50 CAPSULE, EXTENDED RELEASE ORAL at 08:08

## 2023-04-12 RX ADMIN — HYDROMORPHONE HYDROCHLORIDE 2 MILLIGRAM(S): 2 INJECTION INTRAMUSCULAR; INTRAVENOUS; SUBCUTANEOUS at 18:31

## 2023-04-12 RX ADMIN — ZOLPIDEM TARTRATE 5 MILLIGRAM(S): 10 TABLET ORAL at 22:38

## 2023-04-12 RX ADMIN — Medication 100 MILLIGRAM(S): at 06:26

## 2023-04-12 RX ADMIN — HEPARIN SODIUM 5000 UNIT(S): 5000 INJECTION INTRAVENOUS; SUBCUTANEOUS at 18:20

## 2023-04-12 RX ADMIN — GABAPENTIN 400 MILLIGRAM(S): 400 CAPSULE ORAL at 13:47

## 2023-04-12 RX ADMIN — LOSARTAN POTASSIUM 100 MILLIGRAM(S): 100 TABLET, FILM COATED ORAL at 06:26

## 2023-04-12 RX ADMIN — Medication 20 MILLIGRAM(S): at 06:27

## 2023-04-12 RX ADMIN — Medication 250 MILLIGRAM(S): at 17:38

## 2023-04-12 RX ADMIN — GABAPENTIN 400 MILLIGRAM(S): 400 CAPSULE ORAL at 22:37

## 2023-04-12 NOTE — H&P ADULT - PROBLEM SELECTOR PLAN 1
See above.   Seen by ortho--ortho to review RIGHT femur CTT.   Would consider MRI RIGHT hip if no contraindications.    MSO4 for adequate pain control    Would consider formal ID evaluation in the AM.

## 2023-04-12 NOTE — H&P ADULT - NSHPSOCIALHISTORY_GEN_ALL_CORE
NO ethanol.   Quit tobacco before admission at [facility].  Spouse apparently with a CVA.  Daughter

## 2023-04-12 NOTE — H&P ADULT - PROBLEM SELECTOR PLAN 4
See above.  Will continue with IV Lasix 20 mg daily.  Upgraded to telemetry for proper monitoring.  Would consider formal Cardiology evaluation in the AM.

## 2023-04-12 NOTE — H&P ADULT - ASSESSMENT
NIGHT HOSPITALIST;   Referral from SNF with worsening RIGHT hip pain in the setting of prior revision in 2019 but with apparent hospitalization at [facility] for MRSA bacteremia with rifampin for synergy.   Vancomycin at most recent dosing of 750 mg Q12H and rifampin resumed.  Will get third set of blood cultures.    Patient upgraded to telemetry for proper monitoring in the setting to exclude endocarditis, follow EKG intervals and suspicion of element of CHF.  TTE ordered.    Would consider formal ID and cardiology evaluation in the AM.    Would consider MRI RIGHT hip if no contraindications.  Patient agrees to MSO4 2 mg IVP Q4H PRN for adequate pain control.    Patient on chronic Klonopin PRN (see [place] I Stop in chart.).    Patient agrees to pharmacologic DVT prophylaxis.    Patient with mild COPD exacerbation but will continue with Duoneb therapy and avoid systemic steroids for now with suspected bacteremia.  Suspect principal etiology if CHF for O2 requirements>>will continue with Lasix 20 mg IVP daily.  Echo as above.

## 2023-04-12 NOTE — H&P ADULT - NSHPLABSRESULTS_GEN_ALL_CORE
Chest radiograph interpreted with small LEFT pleural effusion, PICC in SVC    WBC 5.5  72N    Hgb 9.4    Platelets of 218K    INR 1.1    HS troponin 19>>18    Random glucose of 111  Cr 0.9  K+ 4.6    Alb 3.1    BNP 3206    Vanco trough of 15.1    COVID-19 PCR>>negative.    Pelvis radiograph with sclerotic prox RIGHT femur, chronic infection, no acute fracture, LEFT hip with no fx. Chest radiograph interpreted with small LEFT pleural effusion, PICC in SVC    EKG tracing interpreted by me with NSR at 70.  Normal intervals.    WBC 5.5  72N    Hgb 9.4    Platelets of 218K    INR 1.1    HS troponin 19>>18    Random glucose of 111  Cr 0.9  K+ 4.6    Alb 3.1    BNP 3206    Vanco trough of 15.1    COVID-19 PCR>>negative.    Pelvis radiograph with sclerotic prox RIGHT femur, chronic infection, no acute fracture, LEFT hip with no fx.

## 2023-04-12 NOTE — CONSULT NOTE ADULT - ASSESSMENT
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 Patient with severe pain despite oral Rx at [facility] and was sent to the ER.      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  MRSA infection of the hip last time which lead to surgery.       PLAN:   C/w vanco, level therapeutic.   monitor vancomycin trough and creatinine to avoid nephrotoxicity and ensure efficacy   no need for rifampin, especially if the concern is hip as a source, pt with chronic OM, the treatment for chronic OM is surgical debridement and not abx.   Risk of interaction with other meds with rifampin very high.   follow up repeat blood cx  Ortho on board  recommend IR guided aspiration of the rt hip joint.   Ankle ulcer, call wound care /podiatry for recs.   Need records form [facility].  needs TTE       Plan discussed with Medicine NP.     Aurelio Montoya  Please contact through MS Teams   If no response or past 5 pm/weekend call [phone].    64M PMHx seizure disorder, schizophrenia, HTN here with altered mental status.    #Altered mental status, toxic metabolic encephalopathy  possible h/o schizophrenia, suspect psychosis vs. neurocognitive disorder  calm but refusing blood draws, medications  psych consult appreciated  zyprexa    #tinea pedis  -Resolved s/p clotrimazole cream    #HTN  norvasc 10  added enalapril, increased dose to 10 mg  Apparently patient has been refusing meds and vitals at times.    #Seizure disorder  depakote 500 bid  keppra 750 bid    #DVT ppx  lovenox    -DC pending case mgmt/SW; pt is placement issue, citizenship.    #Progress Note Handoff:  Pending (specify):  Consults_________, Tests________, Test Results_______, Other___discharge planning______  Family discussion:d/w pt at bedside re: treatment plan, primary dx  Disposition: Home___/SNF___/Other________/Unknown at this time____x____

## 2023-04-12 NOTE — PHYSICAL THERAPY INITIAL EVALUATION ADULT - MANUAL MUSCLE TESTING RESULTS, REHAB EVAL
Gross MMt noted at least 3+/5 throughout BUE and LLE, RLE strength testing limited by pain/grossly assessed due to

## 2023-04-12 NOTE — H&P ADULT - PROBLEM SELECTOR PLAN 2
See above.   Third set of surveillance blood cultures ordered.  Upgraded to telemetry for proper monitoring.    Echo.  Would consider formal Cardiology evaluation in the AM.

## 2023-04-12 NOTE — PATIENT PROFILE ADULT - FALL HARM RISK - HARM RISK INTERVENTIONS
Assistance with ambulation/Assistance OOB with selected safe patient handling equipment/Communicate Risk of Fall with Harm to all staff/Discuss with provider need for PT consult/Monitor gait and stability/Reinforce activity limits and safety measures with patient and family/Tailored Fall Risk Interventions/Visual Cue: Yellow wristband and red socks/Bed in lowest position, wheels locked, appropriate side rails in place/Call bell, personal items and telephone in reach/Instruct patient to call for assistance before getting out of bed or chair/Non-slip footwear when patient is out of bed/[place] to call system/Physically safe environment - no spills, clutter or unnecessary equipment/Purposeful Proactive Rounding/Room/bathroom lighting operational, light cord in reach

## 2023-04-12 NOTE — PHYSICAL THERAPY INITIAL EVALUATION ADULT - PERTINENT HX OF CURRENT PROBLEM, REHAB EVAL
78 y/o F--history of revision of a RIGHT USAMA in April 2019 with second revision of RIGHT hip Sept 2019 in the setting of patient with a history of essential HTN on multiple medications including Cozaar, hydralazine, Nifedipine ER, chronic anxiety disorder (see [place]) on PRN Klonopin, chronic sleep disorder on longstanding Ambien, with patient on chronic oral MSO4 and PRN Percocet apparently in the setting of patient with a recent hospitalisation at [facility] 3 weeks ago in the setting of apparent MRSA bacteremia with patient on IV vancomycin 750 mg Q12H with rifampin as a synergistic agent.  The patient is also on Neurontin for pain modulation.  Patient with severe pain despite oral Rx at [facility] and was sent to the ER.   Patient seen by ortho in the ER.   Patient denies fever, chills, rigors.  No chest pain/pressure.  NO palpitations.   Patient denies dyspnoea but with poor exercise capacity>>apparently with transient hypoxaemia  in the ER and placed on 3 Litres O2 NC and given Lasix 40 mg IV x 1, Duoneb.  Patient presently denies abdominal pain, no red blood per rectum or melena.  NO diaphoresis.     Xray R hip: Status post longstem right hip total arthroplasty with chronic hardware loosening and osseous stress reaction surrounding a large cortical defect along the lateral subtrochanteric proximal femoral shaft, concerning for chronic osteomyelitis in the proper clinical setting. Correlate for purulent drainage and consider follow-up MRI and/or aspiration analysis for further evaluation.  CT femur:  Revision type right total hip arthroplasty is present with partial absence of the proximal femur, extensive heterotopic ossification around the proximal femoral stem, lucency around the femoral stem, and smooth periosteal thickening of the mid femoral diaphysis. Changes may be related to loosening or infection in the appropriate setting. Moderate to severe subcutaneous edema is present without discrete   fluid collection or gas.

## 2023-04-12 NOTE — H&P ADULT - NSICDXPASTMEDICALHX_GEN_ALL_CORE_FT
PAST MEDICAL HISTORY:  CAD (coronary artery disease)     Elevated brain natriuretic peptide (BNP) level     Essential hypertension     MRSA bacteremia

## 2023-04-12 NOTE — H&P ADULT - NSHPSOURCEINFOTX_GEN_ALL_CORE
Reviewed Medex from [facility]  SNF.  Left general message with patient's daughter, Brittni Srivastava, [phone] with patient's permission.  [facility] I stop # [id number] in the chart.

## 2023-04-12 NOTE — H&P ADULT - MOTOR
Triggers: My father and his past. Something may remind me of him and I'll react. Fearful of my father. He is stalking us.  Triggers: My father and his past. Something may remind me of him and I'll react. ([employer] tickets, Jainism, [place]) UE/LE 5/5.  Limited assessment of RIGHT LE due to severe RIGHT hip pain.

## 2023-04-12 NOTE — H&P ADULT - NSHPADDITIONALINFOADULT_GEN_ALL_CORE
NIGHT HOSPITALIST:    Patient aware of course and agrees with plan/care as above.   Given patient's comorbidities, patient's long term prognosis is guarded.   Emotional support provided to patient.  Care reviewed with covering NP/PA for endorsement to the Daytime Provider.    Milan Eng MD NIGHT HOSPITALIST:    Patient aware of course and agrees with plan/care as above.   Given patient's comorbidities, patient's long term prognosis is guarded.   Emotional support provided to patient.  Care reviewed with covering NP/PARios for endorsement to the Daytime Provider.    Milan Eng MD

## 2023-04-12 NOTE — H&P ADULT - NSHPREVIEWOFSYSTEMS_GEN_ALL_CORE
NO HA, no focal weakness.  NO chest pain/pressure.  NO palpitations.  NO cough, no hemoptysis.  NO wheezing.  NO breast symptoms.  NO abdominal pain, no red blood per rectum or melena.    NO back pain, no tearing back pain.  NO SI/HI>  NO thyroid symptoms  NO dysuria, no hematuria.  NO vaginal bleeding.

## 2023-04-12 NOTE — CONSULT NOTE ADULT - SUBJECTIVE AND OBJECTIVE BOX
Patient is a 77y old  Female who presents with a chief complaint of Sent in from [facility] for worsening RIGHT hip pain (12 Apr 2023 13:07)      HPI:  76 y/o F--history of revision of a RIGHT USAMA in April 2019 with second revision of RIGHT hip Sept 2019 in the setting of patient with a history of essential HTN on multiple medications including Cozaar, hydralazine, Nifedipine ER, chronic anxiety disorder (see [place] on PRN Klonopin, chronic sleep disorder on longstanding Ambien, with patient on chronic oral MSO4 and PRN Percocet apparently in the setting of patient with a recent hospitalisation at [facility] 3 weeks ago in the setting of apparent MRSA bacteremia with patient on IV vancomycin 750 mg Q12H with rifampin as a synergistic agent.  The patient is also on Neurontin for pain modulation.  Patient with severe pain despite oral Rx at [facility] and was sent to the ER.   Patient seen by ortho in the ER.   Patient denies fever, chills, rigors.  No chest pain/pressure.  NO palpitations.   Patient denies dyspnoea but with poor exercise capacity>>apparently with transient hypoxaemia  in the ER and placed on 3 Litres O2 NC and given Lasix 40 mg IV x 1, Duoneb.    Patient presently denies abdominal pain, no red blood per rectum or melena.  NO diaphoresis. (12 Apr 2023 02:44)  Above reviewed:  Pt with rt hip pain, which is on and off for years now. Last surgery for hip, 2 stage procedure and finally reimplanted.         PAST MEDICAL & SURGICAL HISTORY:  MRSA bacteremia      Essential hypertension      CAD (coronary artery disease)      Elevated brain natriuretic peptide (BNP) level      History of arthroplasty of right hip          REVIEW OF SYSTEMS    General: No Fevers, no chills     Skin: No new rash  	  Ophthalmologic: Denies any discharge, redness.  	  ENMT: No nasal congestion or throat pain.     Respiratory and Thorax: per HPI  	  Cardiovascular: No chest pain,     Gastrointestinal: No nausea, abdominal pain or diarrhea.    Genitourinary: No dysuria,     Musculoskeletal: per HPI     Neurological: No new extremity weakness.    Psychiatric: No hallucinations	    Extremities: No swelling     Endocrine: No abnormal heat or cold intolerance     Allergic/Immunologic:	No hives        Social history:  From rehab, smoker until recently.       FAMILY HISTORY:  Family history of essential hypertension        Allergies  No Known Allergies        Antimicrobials:  rifAMPin 300 milliGRAM(s) Oral every 12 hours  vancomycin  IVPB 750 milliGRAM(s) IV Intermittent every 12 hours        Vital Signs Last 24 Hrs  T(C): 36.8 (12 Apr 2023 11:54), Max: 37.7 (11 Apr 2023 20:30)  T(F): 98.3 (12 Apr 2023 11:54), Max: 99.9 (11 Apr 2023 20:30)  HR: 75 (12 Apr 2023 11:54) (69 - 77)  BP: 132/65 (12 Apr 2023 11:54) (132/65 - 154/58)  BP(mean): --  RR: 18 (12 Apr 2023 11:54) (18 - 20)  SpO2: 97% (12 Apr 2023 11:54) (92% - 98%)    Parameters below as of 12 Apr 2023 11:54  Patient On (Oxygen Delivery Method): nasal cannula        PHYSICAL EXAM: Patient in no acute distress.    Constitutional: Comfortable. Awake and alert    Eyes: No discharge or conjunctival injection    ENMT: No thrush. No pharyngeal erythema.    Neck: Supple,     Respiratory:  + air entry bilaterally, on supplemental oxygen     Cardiovascular: S1 S2 wnl,     Gastrointestinal: Soft BS(+) no tenderness, non distended.    Genitourinary: No CVA tenderness     Extremities: Rt LE edema. rt arm PICC     Vascular: peripheral pulses felt    Neurological: No new gross focal deficits.    Skin: No rash, rt ankle, medial malleolus ulcer with fibrinous base,     Musculoskeletal: rt hip TKR scar well healed with some erythema and warmth surrounding the incision. Rt LE erythema of lower half of the leg with some warmth.     Psychiatric: Anxious                               10.1   6.45  )-----------( 222      ( 12 Apr 2023 11:01 )             32.7       04-12    137  |  97  |  25<H>  ----------------------------<  149<H>  4.4   |  32<H>  |  0.75    Ca    8.6      12 Apr 2023 11:01    TPro  6.9  /  Alb  3.1<L>  /  TBili  0.3  /  DBili  x   /  AST  13  /  ALT  14  /  AlkPhos  92  04-11            Radiology: Imaging reviewed and visualized personally [ x]    < from: Xray Hip 2-3 Views, Right (04.11.23 @ 20:46) >  IMPRESSION:    Status post longstem right hip total arthroplasty with chronic hardware   loosening and osseous stress reaction surrounding a large cortical defect   along the lateral subtrochanteric proximal femoral shaft, concerning for   chronic osteomyelitis in the proper clinical setting. Correlate for   purulent drainage and consider follow-up MRI and/or aspiration analysis   for further evaluation.          < from: CT Femur No Cont, Right (04.11.23 @ 23:38) >  IMPRESSION:  1.  Revision type right total hip arthroplasty is present with partial   absence of the proximal femur, extensive heterotopic ossification around   the proximal femoral stem, lucency around the femoral stem, and smooth   periosteal thickening of the mid femoral diaphysis. Changes may be   related to loosening or infection in the appropriate setting.  2.  Moderate to severe subcutaneous edema is present without discrete   fluid collection or gas.  3.  Correlation with prior imaging is suggested to assess stability of   these findings.

## 2023-04-12 NOTE — CONSULT NOTE ADULT - ASSESSMENT
77 F, PMHx revision of a RIGHT USAMA in April 2019, second revision of RIGHT hip Sept 2019, HTN, chronic anxiety disorder, chronic sleep disorder, recent hospitalisation at [facility] 3 weeks ago for MRSA bacteremia, ?chronic OM right hip.    Current out- patient pain regimen: Tylenol #4 w/ codeine (60 mg), Gabapentin 400 mg TID, also on Ambien, Clonazepam and Diphenoxylate-atropine per  prescribed by PCP Dr. Sethi, at rehab was taking Percocet 5.  Out Patient Pain Management provider: PCP Dr. Navdeep Cordero historian, reports chronic right hip pain, states Morphine doesn't help and PO does nothing, felt more effect with her out-pt Tylenol w/ Codeine. Pain is sharp, constant. Speech tangential, requiring frequent redirection. Explained to pt need to discontinue Morphine 2/2 renal function, risks of toxicity, and pt reporting no effect. Pt then repeatedly shouted I want Morphine, don't stop the morphine, I don't care about my kidneys, etc. Would not listen to reason or risks or explanation that we can give safer, more effective doses of Oxycodone or Dilaudid, pt shouting that she doesn't want oxycodone.    Plan discussed with primary team:  Recommend discontinue Morphine 2/2 CrCl = 38.8, risk of toxicity   Start Dilaudid 2 mg PO every 8 hrs PRN severe pain  Continue Gabapentin 400 mg every 8 hours (max dose for CrCl = 1400 mg/day)  Lidoderm to right hip daily  Warm/cool packs for comfort  Bowel Regimen  PT per primary team  Monitor for sedation, respiratory depression  Narcan Rescue Kit on discharge (Naloxone 4 mg/0.1 ml nasal spray - 1 spray q 2-3 minutes alternating between nostrils)    Time spent on encounter:  25      Minutes    Chronic Pain Service  [phone]

## 2023-04-12 NOTE — H&P ADULT - HISTORY OF PRESENT ILLNESS
NIGHT HOSPITALIST:   Patient UNKNOWN to me previously, assigned to me at this point via the ER to admit this 76 y/o F--history of revision of a RIGHT USAMA in April 2019 with second revision of RIGHT hip Sept 2019 in the setting of patient with a history of essential HTN on multiple medications including Cozaar, hydralazine, Nifedipine ER, chronic [place]see [place] on PRN Klonopin, chronic sleep disorder on longstanding Ambien, with patient on chronic oral MSO4 and PRN Percocet apparently in the setting of patient with a recent hospitalisation at [facility] 3 weeks ago in the setting of apparent MRSA bacteremia with patient on IV vancomycin 750 mg Q12H with rifampin as a synergistic agent.  Patient with severe pain despite oral Rx at [facility] and was sent to the ER.   Patient seen by ortho in the ER.   Patient denies fever, chills, rigors.  No chest pain/pressure.  NO palpitations.   Patient denies dyspnoea but with poor exercise capacity>>apparently with transient hypoxaemia  in the ER and placed on 3 Litres O2 NC and given Lasix 40 mg IV x 1, Duoneb.    Patient presently denies abdominal pain, no red blood per rectum or melena.  NO diaphoresis. NIGHT HOSPITALIST:   Patient UNKNOWN to me previously, assigned to me at this point via the ER to admit this 76 y/o F--history of revision of a RIGHT USAMA in April 2019 with second revision of RIGHT hip Sept 2019 in the setting of patient with a history of essential HTN on multiple medications including Cozaar, hydralazine, Nifedipine ER, chronic anxiety disorder (see [place] on PRN Klonopin, chronic sleep disorder on longstanding Ambien, with patient on chronic oral MSO4 and PRN Percocet apparently in the setting of patient with a recent hospitalisation at [facility] 3 weeks ago in the setting of apparent MRSA bacteremia with patient on IV vancomycin 750 mg Q12H with rifampin as a synergistic agent.  The patient is also on Neurontin for pain modulation.  Patient with severe pain despite oral Rx at [facility] and was sent to the ER.   Patient seen by ortho in the ER.   Patient denies fever, chills, rigors.  No chest pain/pressure.  NO palpitations.   Patient denies dyspnoea but with poor exercise capacity>>apparently with transient hypoxaemia  in the ER and placed on 3 Litres O2 NC and given Lasix 40 mg IV x 1, Duoneb.    Patient presently denies abdominal pain, no red blood per rectum or melena.  NO diaphoresis.

## 2023-04-12 NOTE — PHYSICAL THERAPY INITIAL EVALUATION ADULT - TRANSFER TRAINING, PT EVAL
Pt will perform supine to sit independently for safety with transfers and ambulation within 2 weeks.

## 2023-04-12 NOTE — CONSULT NOTE ADULT - SUBJECTIVE AND OBJECTIVE BOX
Chief Complaint:  Patient is a 77y old  Female who presents with a chief complaint of Sent in from [facility] for worsening RIGHT hip pain (12 Apr 2023 02:44)    HPI:  78 y/o F--history of revision of a RIGHT USAMA in April 2019 with second revision of RIGHT hip Sept 2019 in the setting of patient with a history of essential HTN on multiple medications including Cozaar, hydralazine, Nifedipine ER, chronic anxiety disorder (see [place] I Stop) on PRN Klonopin, chronic sleep disorder on longstanding Ambien, with patient on chronic oral MSO4 and PRN Percocet apparently in the setting of patient with a recent hospitalisation at [facility] 3 weeks ago in the setting of apparent MRSA bacteremia with patient on IV vancomycin 750 mg Q12H with rifampin as a synergistic agent.  The patient is also on Neurontin for pain modulation.  Patient with severe pain despite oral Rx at [facility] and was sent to the ER.   Patient seen by ortho in the ER.      Current out- patient pain regimen: Tylenol #4 w/ codeine (60 mg), Gabapentin 400 mg TID, also on Ambien, Clonazepam and Diphenoxylate-atropine per  prescribed by PCP Dr. Sethi, at rehab was taking Percocet 5.    Out Patient Pain Management provider: PCP Dr. Navdeep Montez    [facility] Prescription Monitoring Program: Reference #[id number]    Opioid Risk Tool (ORT-OUD) Score: Low    Pain Score: 8/10 to 5/10    Pt seen sitting up in bed, c/o TV remote, poor food, generally unhappy. Poor historian, reports chronic right hip pain, states Morphine doesn't help and PO does nothing, felt more effect with her out-pt Tylenol w/ Codeine. Pain is sharp, constant. Speech tangential, requiring frequent redirection. Explained to pt need to discontinue Morphine 2/2 renal function, risks of toxicity, and pt reporting no effect. Pt then repeatedly shouted I want Morphine, don't stop the morphine, I don't care about my kidneys, etc. Would not listen to reason or risks or explanation that we can give safer, more effective doses of Oxycodone or Dilaudid, pt shouting that she doesn't want oxycodone.    REVIEW OF SYSTEMS:  CONSTITUTIONAL: No fever, weight loss, fatigue, falls  NEURO: No headaches, memory loss, loss of strength, tremors, dizziness or blurred vision  RESP: No shortness of breath, cough  CV: No chest pain, palpitations  GI: No abdominal pain, nausea, vomiting, diarrhea, constipation, incontinence  : No urinary incontinence/retention  MSK: No upper or lower motor strength weakness  SKIN: No itching, burning, rashes, or lesions   PSYCHIATRIC: No depression, anxiety, mood swings, or difficulty sleeping      PHYSICAL EXAM  GENERAL: Seen at bedside, NAD, well-groomed, thin frame, appears older than stated age, no signs of toxicity  NEURO: Alert & Oriented X3, fair concentration; Follows commands, SILT  HEENT: Head atraumatic, normocephalic; speech clear and fluent, Tangential  GI: Appetite good, last BM 2 days ago  : Female urinary device to wall suction with yellow urine  EXTREMITIES: 2+ Peripheral Pulses, moving all extremities, mild erythema RLE  MSK: Motor Strength 5/5 B/L upper and lower extremities; ROM intact; Straight Leg Raise negative; no paravertebral tenderness; no tenderness on spinal palpation; no muscle spasm; ambulates independently w/ walker w/rollator with cane  SKIN: No rashes or lesions  PSYCH: affect normal; good eye contact; no signs of depression or anxiety    PAST MEDICAL & SURGICAL HISTORY:  MRSA bacteremia      Essential hypertension      CAD (coronary artery disease)      Elevated brain natriuretic peptide (BNP) level      History of arthroplasty of right hip          FAMILY HISTORY:  Family history of essential hypertension        Allergies    No Known Allergies      MEDICATIONS  (STANDING):  albuterol/ipratropium for Nebulization 3 milliLiter(s) Nebulizer every 6 hours  furosemide   Injectable 20 milliGRAM(s) IV Push daily  gabapentin 400 milliGRAM(s) Oral three times a day  heparin   Injectable 5000 Unit(s) SubCutaneous every 12 hours  hydrALAZINE 100 milliGRAM(s) Oral every 8 hours  losartan 100 milliGRAM(s) Oral daily  multivitamin 1 Tablet(s) Oral daily  NIFEdipine XL 60 milliGRAM(s) Oral daily  rifAMPin 300 milliGRAM(s) Oral every 12 hours  vancomycin  IVPB 750 milliGRAM(s) IV Intermittent every 12 hours    MEDICATIONS  (PRN):  acetaminophen     Tablet .. 650 milliGRAM(s) Oral every 6 hours PRN Temp greater or equal to 38C (100.4F), Mild Pain (1 - 3)  clonazePAM  Tablet 0.5 milliGRAM(s) Oral two times a day PRN for anxiety  morphine  - Injectable 2 milliGRAM(s) IV Push every 4 hours PRN Moderate Pain (4 - 6)  zolpidem 5 milliGRAM(s) Oral at bedtime PRN Insomnia      Vital Signs:  T(C): 36.8 (04-12-23 @ 11:54)  HR: 75 (04-12-23 @ 11:54)  BP: 132/65 (04-12-23 @ 11:54)  RR: 18 (04-12-23 @ 11:54)  SpO2: 97% (04-12-23 @ 11:54)    Pertinent labs/radiology:  Reviewed                          10.1   6.45  )-----------( 222      ( 12 Apr 2023 11:01 )             32.7       04-12    137  |  97  |  25<H>  ----------------------------<  149<H>  4.4   |  32<H>  |  0.75    Ca    8.6      12 Apr 2023 11:01    TPro  6.9  /  Alb  3.1<L>  /  TBili  0.3  /  DBili  x   /  AST  13  /  ALT  14  /  AlkPhos  92  04-11

## 2023-04-13 ENCOUNTER — TRANSCRIPTION ENCOUNTER (OUTPATIENT)
Age: 78
End: 2023-04-13

## 2023-04-13 DIAGNOSIS — I50.22 CHRONIC SYSTOLIC (CONGESTIVE) HEART FAILURE: ICD-10-CM

## 2023-04-13 LAB
ANION GAP SERPL CALC-SCNC: 8 MMOL/L — SIGNIFICANT CHANGE UP (ref 5–17)
B PERT IGG+IGM PNL SER: CLEAR — SIGNIFICANT CHANGE UP
BUN SERPL-MCNC: 24 MG/DL — HIGH (ref 7–23)
CALCIUM SERPL-MCNC: 9 MG/DL — SIGNIFICANT CHANGE UP (ref 8.4–10.5)
CHLORIDE SERPL-SCNC: 100 MMOL/L — SIGNIFICANT CHANGE UP (ref 96–108)
CO2 SERPL-SCNC: 32 MMOL/L — HIGH (ref 22–31)
COLOR FLD: SIGNIFICANT CHANGE UP
CREAT SERPL-MCNC: 0.78 MG/DL — SIGNIFICANT CHANGE UP (ref 0.5–1.3)
EGFR: 79 ML/MIN/1.73M2 — SIGNIFICANT CHANGE UP
EOSINOPHIL # FLD: 15 % — SIGNIFICANT CHANGE UP
FLUID INTAKE SUBSTANCE CLASS: SIGNIFICANT CHANGE UP
GLUCOSE FLD-MCNC: <6 MG/DL — SIGNIFICANT CHANGE UP
GLUCOSE SERPL-MCNC: 83 MG/DL — SIGNIFICANT CHANGE UP (ref 70–99)
HCV AB S/CO SERPL IA: 0.29 S/CO — SIGNIFICANT CHANGE UP (ref 0–0.99)
HCV AB SERPL-IMP: SIGNIFICANT CHANGE UP
LYMPHOCYTES # FLD: 5 % — SIGNIFICANT CHANGE UP
MAGNESIUM SERPL-MCNC: 2.2 MG/DL — SIGNIFICANT CHANGE UP (ref 1.6–2.6)
MONOS+MACROS # FLD: 10 % — SIGNIFICANT CHANGE UP
NEUTROPHILS-BODY FLUID: 70 % — SIGNIFICANT CHANGE UP
PHOSPHATE SERPL-MCNC: 4.4 MG/DL — SIGNIFICANT CHANGE UP (ref 2.5–4.5)
POTASSIUM SERPL-MCNC: 4.7 MMOL/L — SIGNIFICANT CHANGE UP (ref 3.5–5.3)
POTASSIUM SERPL-SCNC: 4.7 MMOL/L — SIGNIFICANT CHANGE UP (ref 3.5–5.3)
PROT FLD-MCNC: <0.6 G/DL — SIGNIFICANT CHANGE UP
RCV VOL RI: 175 /UL — HIGH (ref 0–0)
SODIUM SERPL-SCNC: 140 MMOL/L — SIGNIFICANT CHANGE UP (ref 135–145)
TOTAL NUCLEATED CELL COUNT, BODY FLUID: <1 /UL — SIGNIFICANT CHANGE UP
TUBE TYPE: SIGNIFICANT CHANGE UP
VANCOMYCIN TROUGH SERPL-MCNC: 29.9 UG/ML — CRITICAL HIGH (ref 10–20)

## 2023-04-13 PROCEDURE — 99232 SBSQ HOSP IP/OBS MODERATE 35: CPT

## 2023-04-13 PROCEDURE — 99221 1ST HOSP IP/OBS SF/LOW 40: CPT

## 2023-04-13 PROCEDURE — 93308 TTE F-UP OR LMTD: CPT | Mod: 26

## 2023-04-13 PROCEDURE — 73610 X-RAY EXAM OF ANKLE: CPT | Mod: 26,RT

## 2023-04-13 PROCEDURE — 99233 SBSQ HOSP IP/OBS HIGH 50: CPT

## 2023-04-13 RX ORDER — COLLAGENASE CLOSTRIDIUM HIST. 250 UNIT/G
1 OINTMENT (GRAM) TOPICAL DAILY
Refills: 0 | Status: DISCONTINUED | OUTPATIENT
Start: 2023-04-13 | End: 2023-04-19

## 2023-04-13 RX ORDER — CLONAZEPAM 1 MG
0.5 TABLET ORAL
Refills: 0 | Status: DISCONTINUED | OUTPATIENT
Start: 2023-04-13 | End: 2023-04-18

## 2023-04-13 RX ORDER — LANOLIN ALCOHOL/MO/W.PET/CERES
5 CREAM (GRAM) TOPICAL AT BEDTIME
Refills: 0 | Status: DISCONTINUED | OUTPATIENT
Start: 2023-04-13 | End: 2023-04-19

## 2023-04-13 RX ORDER — OXYCODONE AND ACETAMINOPHEN 5; 325 MG/1; MG/1
1 TABLET ORAL EVERY 6 HOURS
Refills: 0 | Status: DISCONTINUED | OUTPATIENT
Start: 2023-04-13 | End: 2023-04-15

## 2023-04-13 RX ORDER — ASCORBIC ACID 60 MG
500 TABLET,CHEWABLE ORAL DAILY
Refills: 0 | Status: DISCONTINUED | OUTPATIENT
Start: 2023-04-13 | End: 2023-04-19

## 2023-04-13 RX ORDER — CHLORHEXIDINE GLUCONATE 213 G/1000ML
1 SOLUTION TOPICAL DAILY
Refills: 0 | Status: DISCONTINUED | OUTPATIENT
Start: 2023-04-13 | End: 2023-04-19

## 2023-04-13 RX ORDER — MIRTAZAPINE 45 MG/1
7.5 TABLET, ORALLY DISINTEGRATING ORAL AT BEDTIME
Refills: 0 | Status: DISCONTINUED | OUTPATIENT
Start: 2023-04-13 | End: 2023-04-19

## 2023-04-13 RX ADMIN — Medication 0.5 MILLIGRAM(S): at 15:05

## 2023-04-13 RX ADMIN — LIDOCAINE 1 PATCH: 4 CREAM TOPICAL at 05:38

## 2023-04-13 RX ADMIN — HYDROMORPHONE HYDROCHLORIDE 2 MILLIGRAM(S): 2 INJECTION INTRAMUSCULAR; INTRAVENOUS; SUBCUTANEOUS at 07:34

## 2023-04-13 RX ADMIN — OXYCODONE AND ACETAMINOPHEN 1 TABLET(S): 5; 325 TABLET ORAL at 19:16

## 2023-04-13 RX ADMIN — OXYCODONE AND ACETAMINOPHEN 1 TABLET(S): 5; 325 TABLET ORAL at 14:50

## 2023-04-13 RX ADMIN — Medication 60 MILLIGRAM(S): at 05:29

## 2023-04-13 RX ADMIN — Medication 5 MILLIGRAM(S): at 21:17

## 2023-04-13 RX ADMIN — Medication 100 MILLIGRAM(S): at 13:15

## 2023-04-13 RX ADMIN — Medication 250 MILLIGRAM(S): at 05:29

## 2023-04-13 RX ADMIN — OXYCODONE AND ACETAMINOPHEN 1 TABLET(S): 5; 325 TABLET ORAL at 13:54

## 2023-04-13 RX ADMIN — GABAPENTIN 400 MILLIGRAM(S): 400 CAPSULE ORAL at 21:17

## 2023-04-13 RX ADMIN — Medication 3 MILLILITER(S): at 13:14

## 2023-04-13 RX ADMIN — OXYCODONE AND ACETAMINOPHEN 1 TABLET(S): 5; 325 TABLET ORAL at 18:59

## 2023-04-13 RX ADMIN — HYDROMORPHONE HYDROCHLORIDE 2 MILLIGRAM(S): 2 INJECTION INTRAMUSCULAR; INTRAVENOUS; SUBCUTANEOUS at 09:00

## 2023-04-13 RX ADMIN — Medication 100 MILLIGRAM(S): at 21:17

## 2023-04-13 RX ADMIN — GABAPENTIN 400 MILLIGRAM(S): 400 CAPSULE ORAL at 13:15

## 2023-04-13 RX ADMIN — LOSARTAN POTASSIUM 100 MILLIGRAM(S): 100 TABLET, FILM COATED ORAL at 05:30

## 2023-04-13 RX ADMIN — CHLORHEXIDINE GLUCONATE 1 APPLICATION(S): 213 SOLUTION TOPICAL at 12:28

## 2023-04-13 RX ADMIN — GABAPENTIN 400 MILLIGRAM(S): 400 CAPSULE ORAL at 05:29

## 2023-04-13 RX ADMIN — MIRTAZAPINE 7.5 MILLIGRAM(S): 45 TABLET, ORALLY DISINTEGRATING ORAL at 21:16

## 2023-04-13 RX ADMIN — ZOLPIDEM TARTRATE 5 MILLIGRAM(S): 10 TABLET ORAL at 00:15

## 2023-04-13 RX ADMIN — Medication 100 MILLIGRAM(S): at 05:29

## 2023-04-13 NOTE — BH CONSULTATION LIAISON ASSESSMENT NOTE - NSBHCHARTREVIEWVS_PSY_A_CORE FT
Vital Signs Last 24 Hrs  T(C): 36.7 (13 Apr 2023 12:22), Max: 36.8 (12 Apr 2023 20:13)  T(F): 98.1 (13 Apr 2023 12:22), Max: 98.2 (12 Apr 2023 20:13)  HR: 74 (13 Apr 2023 12:22) (70 - 74)  BP: 145/63 (13 Apr 2023 12:22) (145/63 - 169/73)  BP(mean): --  RR: 18 (13 Apr 2023 12:22) (16 - 18)  SpO2: 93% (13 Apr 2023 12:22) (93% - 97%)    Parameters below as of 13 Apr 2023 12:22  Patient On (Oxygen Delivery Method): nasal cannula  O2 Flow (L/min): 2

## 2023-04-13 NOTE — DIETITIAN INITIAL EVALUATION ADULT - PERTINENT MEDS FT
MEDICATIONS  (STANDING):  albuterol/ipratropium for Nebulization 3 milliLiter(s) Nebulizer every 6 hours  chlorhexidine 2% Cloths 1 Application(s) Topical daily  collagenase Ointment 1 Application(s) Topical daily  furosemide   Injectable 20 milliGRAM(s) IV Push daily  gabapentin 400 milliGRAM(s) Oral three times a day  heparin   Injectable 5000 Unit(s) SubCutaneous every 12 hours  hydrALAZINE 100 milliGRAM(s) Oral every 8 hours  lidocaine   4% Patch 1 Patch Transdermal daily  losartan 100 milliGRAM(s) Oral daily  multivitamin 1 Tablet(s) Oral daily  NIFEdipine XL 60 milliGRAM(s) Oral daily  vancomycin  IVPB 750 milliGRAM(s) IV Intermittent every 12 hours    MEDICATIONS  (PRN):  acetaminophen     Tablet .. 650 milliGRAM(s) Oral every 6 hours PRN Temp greater or equal to 38C (100.4F), Mild Pain (1 - 3)  clonazePAM  Tablet 0.5 milliGRAM(s) Oral two times a day PRN for anxiety  oxycodone    5 mG/acetaminophen 325 mG 1 Tablet(s) Oral every 6 hours PRN Severe Pain (7 - 10)  zolpidem 5 milliGRAM(s) Oral at bedtime PRN Insomnia

## 2023-04-13 NOTE — DISCHARGE NOTE PROVIDER - NSDCCAREPROVSEEN_GEN_ALL_CORE_FT
Duyen Cazares Nakul, Alyson Cazares, Duyen Montoya, Aurelio Christianson, Domenico   Nakul, Alyson Cazares, Duyen Montoya, Aurelio Christianson, Domenico

## 2023-04-13 NOTE — DIETITIAN INITIAL EVALUATION ADULT - NSFNSPHYEXAMSKINFT_GEN_A_CORE
Pressure Injury 1: coccyx, Suspected deep tissue injury  Pressure Injury 2: Right:,lateral,malleolus, Unstageable    Wound care consult pending.

## 2023-04-13 NOTE — BH CONSULTATION LIAISON ASSESSMENT NOTE - CURRENT MEDICATION
MEDICATIONS  (STANDING):  albuterol/ipratropium for Nebulization 3 milliLiter(s) Nebulizer every 6 hours  ascorbic acid 500 milliGRAM(s) Oral daily  chlorhexidine 2% Cloths 1 Application(s) Topical daily  collagenase Ointment 1 Application(s) Topical daily  furosemide   Injectable 20 milliGRAM(s) IV Push daily  gabapentin 400 milliGRAM(s) Oral three times a day  heparin   Injectable 5000 Unit(s) SubCutaneous every 12 hours  hydrALAZINE 100 milliGRAM(s) Oral every 8 hours  lidocaine   4% Patch 1 Patch Transdermal daily  losartan 100 milliGRAM(s) Oral daily  multivitamin 1 Tablet(s) Oral daily  NIFEdipine XL 60 milliGRAM(s) Oral daily  vancomycin  IVPB 750 milliGRAM(s) IV Intermittent every 12 hours    MEDICATIONS  (PRN):  acetaminophen     Tablet .. 650 milliGRAM(s) Oral every 6 hours PRN Temp greater or equal to 38C (100.4F), Mild Pain (1 - 3)  clonazePAM  Tablet 0.5 milliGRAM(s) Oral two times a day PRN for anxiety  oxycodone    5 mG/acetaminophen 325 mG 1 Tablet(s) Oral every 6 hours PRN Severe Pain (7 - 10)  zolpidem 5 milliGRAM(s) Oral at bedtime PRN Insomnia

## 2023-04-13 NOTE — BH CONSULTATION LIAISON ASSESSMENT NOTE - HPI (INCLUDE ILLNESS QUALITY, SEVERITY, DURATION, TIMING, CONTEXT, MODIFYING FACTORS, ASSOCIATED SIGNS AND SYMPTOMS)
Patient is a 78 y/o F, , living in [place] with her , former /levi, with a PPHx of anxiety treated with xanax/klonopin presenting with anxiety and panic attacks. Psychiatry C/L was consulted for the anxiety and medication management. Patient reports dealing with a lot of stressors. Notably her  had a stroke 16 years ago and remains severely impaired. Her daughter passed away 3 years ago at home due to "blood clots". Recently she has had issues with her own health as she has an infection in her R hip following surgery years ago. Her health issues and her subsequent decreased mobility are sources of anxiety for her per the patient. She endorses a long history of insomnia and ambien did not help. She was diagnosed with anxiety around the time her   and she was treated with xanax and klonopin periodically since.     Patient is A&Ox3 and has anxious affect and tone. She denies SI/SA/HI. She endorses irritability, insomnia, feeling on edge, nervousness, and describes herself as a subjective worrier. She often worries about taking care of herself, her , and their bills and mentions that it can all get to be overwhelming for her. She mentions that her  has a home health aid that helps take some of the burden off of her.  Patient is a 78 y/o F, , living in [place] with her , former /levi, with a PPHx of anxiety treated with xanax/klonopin presenting with anxiety and panic attacks. Psychiatry C/L was consulted for the anxiety and medication management. Patient reports dealing with a lot of stressors. Notably her  had a stroke 16 years ago and remains severely impaired. Her daughter passed away 3 years ago at home due to "blood clots". Recently she has had issues with her own health as she has an infection in her R hip following surgery years ago. Her health issues and her subsequent decreased mobility are sources of anxiety for her per the patient. She endorses a long history of insomnia and ambien did not help. She was diagnosed with anxiety around the time her   and she was treated with xanax and klonopin periodically since.     Patient is A&Ox3 and has anxious affect and tone. She denies SI/SA/HI. She endorses irritability, insomnia, feeling on edge, nervousness, and describes herself as a subjective worrier. She often worries about taking care of herself, her , and their bills and mentions that it can all get to be overwhelming for her. She mentions that her  has a home health aid that helps take some of the burden off of her.

## 2023-04-13 NOTE — DIETITIAN INITIAL EVALUATION ADULT - NSFNSGIIOFT_GEN_A_CORE
Denies nausea, vomiting, constipation, diarrhea. Reports last BM 4/13. Pt not currently on bowel regimen.

## 2023-04-13 NOTE — BH CONSULTATION LIAISON ASSESSMENT NOTE - NSBHCONSULTRECOMMENDOTHER_PSY_A_CORE FT
Make PRN Klonopin standing - Klonopin 0.5mg p.o. BID  Remeron 7.5mg p.o. at bedtime  melatonin 5mg p.o. at bedtime

## 2023-04-13 NOTE — DISCHARGE NOTE PROVIDER - HOSPITAL COURSE
77yFemale s/p THR (c/b multiple infections and revisions) w/ R hip pain for 1 month. ESR pending/CRP 42. Unknown medical workup completed at [facility]. Due to the complex nature of previous hospital courses and current COPD exacerbation, no acute surgical orthopedic intervention is needed at this time. Any subsequent intervention would require extensive pre-operative planning and counseling with patient    #R hip pain - ortho fol., WBAT, c/w oxycodone IR prn per chronic pain   #Chronic OM  #MRSA Bactremia - Daptomycin 400mg IVPB q24h to complete abx course through 5/14, TTE neg for vegetation. s/p 4/13 Successful right hip aspiration yielding 1 mL serosanginous fluid, OSH blood culture +MRSA 3/17,   #Anxiety - c/w klonopin, remeron, melatonin per psych   #R lateral malleolus ulcer - x-ray neg for OM, ANDREA/PVR showing mod bilat LE arterial disease no acute intervention by podiatry, s/p LE Angiogram 4/19 - no intervention, wound care following   #Chronic diastolic HF - s/p iv lasix, c/w po lasix 40 mg daily     Case discussed with attending.  Given patient's improved clinical status and current hemodynamic stability, the decision was made for discharge.    This is a summary of the patients hospitalization.  For full hospital course, please see medical record. 76 yo female PMH of R USAMA (c/b multiple infections, and revisions with Dr. 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 Awaiting rehab placement.    #R hip pain - ortho consulted, recommending WBAT, c/w percocet PRN as per chronic pain  #Chronic OM  #MRSA Bacteremia - Daptomycin 400mg IVPB q24h to complete abx course through 5/14, TTE neg for vegetation. s/p 4/13 Successful right hip aspiration yielding 1 mL serosanginous fluid, OSH blood culture +MRSA 3/17,   #Anxiety - c/w klonopin, remeron, melatonin per psych   #R lateral malleolus ulcer - x-ray neg for OM, ANDREA/PVR showing mod bilat LE arterial disease no acute intervention by podiatry, s/p LE Angiogram 4/19 - no intervention, wound care following   #Chronic diastolic HF - s/p iv lasix, c/w po lasix 40 mg daily     Case discussed with attending. Given patient's improved clinical status and current hemodynamic stability, the decision was made for discharge.    This is a summary of the patients hospitalization.  For full hospital course, please see medical record. 78 yo female PMH of R USAMA (c/b multiple infections, and revisions with Dr. 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 Awaiting rehab placement.    #R hip pain - ortho consulted, recommending WBAT, c/w percocet PRN as per chronic pain  #Chronic OM  #MRSA Bacteremia - Daptomycin 400mg IVPB q24h to complete abx course through 5/14, TTE neg for vegetation. s/p 4/13 Successful right hip aspiration yielding 1 mL serosanginous fluid, OSH blood culture +MRSA 3/17,   #Anxiety - c/w klonopin, remeron, melatonin per psych   #R lateral malleolus ulcer - x-ray neg for OM, ANDREA/PVR showing mod bilat LE arterial disease no acute intervention by podiatry, s/p LE Angiogram 4/19 - no intervention, wound care following   #Chronic diastolic HF - s/p iv lasix, c/w po lasix 40 mg daily     Case discussed with attending. Given patient's improved clinical status and current hemodynamic stability, the decision was made for discharge.    This is a summary of the patients hospitalization.  For full hospital course, please see medical record.

## 2023-04-13 NOTE — CONSULT NOTE ADULT - ASSESSMENT
78 y/o F w/ R lateral malleolus wound  - Pt seen and evaluated  - Afebrile, no leukocytosis ESR 70 CRP 4.2  - R lateral malleolus wound to level of capsule, scant fibrotic periphery, bed fibrogranular, indurated borders, no malodor or purulence, appears ischemic in etiology  - R Ankle XR pending  - ordered ANDREA/PVR: nonpalpable R pedal pulses  - No acute intervention from podiatry  - Continue IV abx per ID  - Discussed with attending

## 2023-04-13 NOTE — DIETITIAN INITIAL EVALUATION ADULT - OTHER INFO
Reports  lbs. Denies any known recent wt changes.   Dosing wt: 110 lbs (04-11, stated)  No additional weights noted per chart, and unable to weight pt at this time as pt is OOB to chair. RD to continue to monitor weight trends as able/available.     Per chart, pt currently ordered for vancomycin, IV lasix, gabapentin, and a multivitamin in-house.

## 2023-04-13 NOTE — DIETITIAN INITIAL EVALUATION ADULT - PERSON TAUGHT/METHOD
Discussed importance of adequate protein-energy consumption to meet estimated nutrient needs. Encouraged intake of meals as tolerated. Encouraged intake of protein-rich foods first at mealtimes to help preserve lean muscle mass and promote wound healing. Reviewed food choices that are high in protein. Pt noted with good comprehension and made aware RD remains available for further questions/concerns./verbal instruction/patient instructed

## 2023-04-13 NOTE — DIETITIAN INITIAL EVALUATION ADULT - ENERGY INTAKE
Pt reports good PO intake in-house, observed finished >80% of lunch tray this afternoon. Pt states she greatly disliked the soft & bite sized foods she was getting previously. Provided pt with menu and reviewed meal ordering procedures to help ensure pt receives preferred food items.

## 2023-04-13 NOTE — DIETITIAN INITIAL EVALUATION ADULT - OTHER CALCULATIONS
Fluid needs deferred to team. Energy, protein needs based on reported UBW: 126 lbs/57.1 kg with consideration for wounds, CHF, and COPD.

## 2023-04-13 NOTE — PROGRESS NOTE ADULT - SUBJECTIVE AND OBJECTIVE BOX
[facility] Division of Hospital Medicine  Jie Lozano MD  Available via MS Teams  Pager: [phone]    SUBJECTIVE / OVERNIGHT EVENTS: Patient seen and examined at bedside. No acute overnight events. Pt denies dyspnea, chest pain fevers, chills, cough, HA, dizziness, syncope, chest palpitations, abd pain, n/v/d, urinary or bowel changes, active sites of bleeding or any other symptoms at this time.    ADDITIONAL REVIEW OF SYSTEMS: negative     MEDICATIONS  (STANDING):  albuterol/ipratropium for Nebulization 3 milliLiter(s) Nebulizer every 6 hours  chlorhexidine 2% Cloths 1 Application(s) Topical daily  furosemide   Injectable 20 milliGRAM(s) IV Push daily  gabapentin 400 milliGRAM(s) Oral three times a day  heparin   Injectable 5000 Unit(s) SubCutaneous every 12 hours  hydrALAZINE 100 milliGRAM(s) Oral every 8 hours  lidocaine   4% Patch 1 Patch Transdermal daily  losartan 100 milliGRAM(s) Oral daily  multivitamin 1 Tablet(s) Oral daily  NIFEdipine XL 60 milliGRAM(s) Oral daily  vancomycin  IVPB 750 milliGRAM(s) IV Intermittent every 12 hours    MEDICATIONS  (PRN):  acetaminophen     Tablet .. 650 milliGRAM(s) Oral every 6 hours PRN Temp greater or equal to 38C (100.4F), Mild Pain (1 - 3)  clonazePAM  Tablet 0.5 milliGRAM(s) Oral two times a day PRN for anxiety  HYDROmorphone   Tablet 2 milliGRAM(s) Oral every 8 hours PRN Severe Pain (7 - 10)  zolpidem 5 milliGRAM(s) Oral at bedtime PRN Insomnia      I&O's Summary    12 Apr 2023 07:01  -  13 Apr 2023 07:00  --------------------------------------------------------  IN: 240 mL / OUT: 800 mL / NET: -560 mL    13 Apr 2023 07:01  -  13 Apr 2023 13:30  --------------------------------------------------------  IN: 0 mL / OUT: 700 mL / NET: -700 mL        PHYSICAL EXAM:  Vital Signs Last 24 Hrs  T(C): 36.7 (13 Apr 2023 12:22), Max: 36.8 (12 Apr 2023 20:13)  T(F): 98.1 (13 Apr 2023 12:22), Max: 98.2 (12 Apr 2023 20:13)  HR: 74 (13 Apr 2023 12:22) (70 - 74)  BP: 145/63 (13 Apr 2023 12:22) (145/63 - 169/73)  BP(mean): --  RR: 18 (13 Apr 2023 12:22) (16 - 18)  SpO2: 93% (13 Apr 2023 12:22) (93% - 97%)    Parameters below as of 13 Apr 2023 12:22  Patient On (Oxygen Delivery Method): nasal cannula  O2 Flow (L/min): 2    CONSTITUTIONAL: NAD, well-developed, well-groomed  EYES: PERRLA; conjunctiva and sclera clear  ENMT: Moist oral mucosa, no pharyngeal injection or exudates; normal dentition  NECK: Supple, no palpable masses; no thyromegaly  RESPIRATORY: Normal respiratory effort; lungs are clear to auscultation bilaterally  CARDIOVASCULAR: Regular rate and rhythm, normal S1 and S2, no murmur/rub/gallop; No lower extremity edema; Peripheral pulses are 2+ bilaterally  ABDOMEN: Nontender to palpation, normoactive bowel sounds, no rebound/guarding; No hepatosplenomegaly  MUSCULOSKELETAL:   UE/LE 5/5.  Limited assessment of RIGHT LE due to severe RIGHT hip pain  PSYCH: A+O to person, place, and time; affect appropriate  NEUROLOGY: CN 2-12 are intact and symmetric; no gross sensory deficits   SKIN: R lateral malleolus wound    LABS:                        10.1   6.45  )-----------( 222      ( 12 Apr 2023 11:01 )             32.7     04-13    140  |  100  |  24<H>  ----------------------------<  83  4.7   |  32<H>  |  0.78    Ca    9.0      13 Apr 2023 07:34  Phos  4.4     04-13  Mg     2.2     04-13    TPro  6.9  /  Alb  3.1<L>  /  TBili  0.3  /  DBili  x   /  AST  13  /  ALT  14  /  AlkPhos  92  04-11    PT/INR - ( 11 Apr 2023 21:11 )   PT: 13.3 sec;   INR: 1.14 ratio         PTT - ( 11 Apr 2023 21:11 )  PTT:33.9 sec          Culture - Blood (collected 11 Apr 2023 20:50)  Source: .Blood Blood-Peripheral  Preliminary Report (13 Apr 2023 01:02):    No growth to date.    Culture - Blood (collected 11 Apr 2023 20:20)  Source: .Blood Blood-Peripheral  Preliminary Report (13 Apr 2023 01:02):    No growth to date.      SARS-CoV-2: NotDetec (11 Apr 2023 21:08)      RADIOLOGY & ADDITIONAL TESTS:  New Results Reviewed Today:   New Imaging Personally Reviewed Today:    < from: Xray Hip 2-3 Views, Right (04.11.23 @ 20:46) >  IMPRESSION:    Status post longstem right hip total arthroplasty with chronic hardware   loosening and osseous stress reaction surrounding a large cortical defect   along the lateral subtrochanteric proximal femoral shaft, concerning for   chronic osteomyelitis in the proper clinical setting. Correlate for   purulent drainage and consider follow-up MRI and/or aspiration analysis   for further evaluation.          < from: CT Femur No Cont, Right (04.11.23 @ 23:38) >  IMPRESSION:  1.  Revision type right total hip arthroplasty is present with partial   absence of the proximal femur, extensive heterotopic ossification around   the proximal femoral stem, lucency around the femoral stem, and smooth   periosteal thickening of the mid femoral diaphysis. Changes may be   related to loosening or infection in the appropriate setting.  2.  Moderate to severe subcutaneous edema is present without discrete   fluid collection or gas.  3.  Correlation with prior imaging is suggested to assess stability of   these findings.    New Electrocardiogram Personally Reviewed Today:  Prior or Outpatient Records Reviewed Today:    < from: TTE W or WO Ultrasound Enhancing Agent (04.12.23 @ 08:27) >  CONCLUSIONS:      1. Normal left ventricular cavity size. The left ventricular wall thickness is normal. The left ventricular systolic function is hyperdynamic. There are no regional wall motion abnormalities seen.   2. Normal right ventricular cavity size and normal systolic function.   3. Moderate aortic stenosis.   4. Mild to moderate pulmonary hypertension.   5. No echocardiographic evidence of vegetations.      < end of copied text >    < from: Xray Chest 1 View- PORTABLE-Urgent (04.11.23 @ 20:45) >      IMPRESSION:  Small left pleural effusion and associated atelectasis.    Right-sided PICC line with it's tip in the SVC.    < end of copied text >    COMMUNICATION:  Care Discussed with Consultants/Other Providers and Details of Discussion: Discussed with IR resident to expedite IR aspiration of R hip under Dr. Nation, will be performed today.   Discussions with Patient/Family: Called daughter, Brittni, left VM as daughter was unavailable.   PCP Communication:

## 2023-04-13 NOTE — BH CONSULTATION LIAISON ASSESSMENT NOTE - NSBHCHARTREVIEWLAB_PSY_A_CORE FT
10.1   6.45  )-----------( 222      ( 12 Apr 2023 11:01 )             32.7   04-13    140  |  100  |  24<H>  ----------------------------<  83  4.7   |  32<H>  |  0.78    Ca    9.0      13 Apr 2023 07:34  Phos  4.4     04-13  Mg     2.2     04-13    TPro  6.9  /  Alb  3.1<L>  /  TBili  0.3  /  DBili  x   /  AST  13  /  ALT  14  /  AlkPhos  92  04-11

## 2023-04-13 NOTE — BH CONSULTATION LIAISON ASSESSMENT NOTE - SUMMARY
Patient is a 76 y/o F, , living in [place] with her , former /levi, with a PPHx of anxiety treated with xanax/klonopin presenting with anxiety and panic attacks. Psychiatry C/L was consulted for the anxiety and medication management. Patient reports dealing with a lot of stressors. Notably her  had a stroke 16 years ago and remains severely impaired. Her daughter passed away 3 years ago at home due to "blood clots". Recently she has had issues with her own health as she has an infection in her R hip following surgery years ago. Her health issues and her subsequent decreased mobility are sources of anxiety for her per the patient. She endorses a long history of insomnia and ambien did not help. She was diagnosed with anxiety around the time her   and she was treated with xanax and klonopin periodically since.     Patient is A&Ox3 and has anxious affect and tone. She denies SI/SA/HI. She endorses irritability, insomnia, feeling on edge, nervousness, and describes herself as a subjective worrier. She often worries about taking care of herself, her , and their bills and mentions that it can all get to be overwhelming for her. She mentions that her  has a home health aid that helps take some of the burden off of her.     Of note, patient is amenable to pharmacological treatment with klonopin/xanax and is interested in virtual outpatient psychiatry services.

## 2023-04-13 NOTE — PROGRESS NOTE ADULT - PROBLEM SELECTOR PLAN 2
KARLEE MODI. Has PICC line from [facility]? attempted to call daughter again today to gather collateral  info regarding admission at [facility], daughter did not . Will aim to call daughter tomorrow.   - Will continue with Vancomycin given suspicion of chronic OM   - TTE negative for vegetations

## 2023-04-13 NOTE — DIETITIAN INITIAL EVALUATION ADULT - REASON FOR ADMISSION
Chronic obstructive pulmonary disease with acute exacerbation    Per chart: 77yFemale w/ PMH of R USAMA (c/b multiple infections, and revisions with Dr. Be) HTN, HLD c/o R hip pain for 1 month.  Pt states she presented to [facility] 2 weeks ago due to this pain and was admitted and started on IV antibiotics. Daughter is a PA at [employer] and stated that IR aspirated R hip, but unsure of what the results were, but that blood cultures grew MRSA. Pt was discharged to rehab with PICC on IV vanco, which was held 2 days ago due to high trough level. Pt is presenting to ED today for continued R hip pain.   Hx of Chronic diastolic Heart failure

## 2023-04-13 NOTE — BH CONSULTATION LIAISON ASSESSMENT NOTE - OTHER PAST PSYCHIATRIC HISTORY (INCLUDE DETAILS REGARDING ONSET, COURSE OF ILLNESS, INPATIENT/OUTPATIENT TREATMENT)
Anxiety diagnosed 15 years ago. Has been prescribed xanax (1 mg) and klonopin (2 mg) in the past. Followed with Dr. Navdeep Montez and Dr. Morro Caceres for care. No past psychiatric hospitalizations. Interested in virtual outpatient therapy/treatment.

## 2023-04-13 NOTE — DIETITIAN INITIAL EVALUATION ADULT - REASON
Nutrition-focused physical examination deferred at this time - pt complaining of pain, asking for RN at time of visit. No overt signs of fat/muscle wasting visually observed.

## 2023-04-13 NOTE — DISCHARGE NOTE PROVIDER - NSDCFUADDAPPT_GEN_ALL_CORE_FT
Podiatry Discharge Instructions:  Follow up: Please follow up with Dr. Lasha Garcia within 1 week of discharge from the hospital, please call [phone] for appointment and discuss that you recently were seen in the hospital.  Wound Care: Please apply santyl to right lateral ankle wound followed by sterile 4x4 gauze, abd pad and kerlex daily  Weight bearing: Please weight bear as tolerated in a surgical shoe.  Antibiotics: Please continue as instructed.

## 2023-04-13 NOTE — PROGRESS NOTE ADULT - ASSESSMENT
77F w/ PMHx of R USAMA (c/b multiple infections, and revisions with Dr. Be) HTN, HLD c/o R hip pain for 1 month, concerning for OM.

## 2023-04-13 NOTE — BH CONSULTATION LIAISON ASSESSMENT NOTE - NSBHATTESTCOMMENTATTENDFT_PSY_A_CORE
This is a 77-y.o. CF patient, , living in [place] with her , former /levi, with a PPHx of anxiety treated with xanax/klonopin presenting with anxiety and panic attacks. Psychiatry C/L was consulted for the anxiety and medication management. Patient reports dealing with a lot of stressors. Notably her  had a stroke 16 years ago and remains severely impaired. Her daughter passed away 3 years ago at home due to "blood clots". Recently she has had issues with her own health as she has an infection in her R hip following surgery years ago. Her health issues and her subsequent decreased mobility are sources of anxiety for her per the patient. She endorses a long history of insomnia and Ambien did not help. She was diagnosed with anxiety around the time her   and she was treated with xanax and Klonopin periodically since.    I have seen and evaluated this patient myself. Chart, labs, meds reviewed. I agree with trainee's assessment and plan. Please make Klonopin 0.5mg p.o. BID standing and make an additional dose available as PRN. Initiate Remeron.

## 2023-04-13 NOTE — DIETITIAN INITIAL EVALUATION ADULT - ADD RECOMMEND
1) Continue DASH diet.  2) Continue multivitamin, and ADD a Vitamin C supplement, pending no medical contraindications, to aid in wound healing.  3) Monitor PO intake, GI tolerance, skin integrity, labs, weight, and bowel movement regularity.   4) Honor food preferences as feasible. Assist with meals PRN and encourage PO intake.  5) RD remains available upon request and will follow-up per protocol.

## 2023-04-13 NOTE — BH CONSULTATION LIAISON ASSESSMENT NOTE - NSBHCONSULTFOLLOWAFTERCARE_PSY_A_CORE FT
Patient can continue with primary provider after discharge. Alternately, she can be referred to [facility] [phone].

## 2023-04-13 NOTE — DIETITIAN INITIAL EVALUATION ADULT - PERTINENT LABORATORY DATA
04-13    140  |  100  |  24<H>  ----------------------------<  83  4.7   |  32<H>  |  0.78    Ca    9.0      13 Apr 2023 07:34  Phos  4.4     04-13  Mg     2.2     04-13    TPro  6.9  /  Alb  3.1<L>  /  TBili  0.3  /  DBili  x   /  AST  13  /  ALT  14  /  AlkPhos  92  04-11    CAPILLARY BLOOD GLUCOSE  POCT Blood Glucose.: 91 mg/dL (12 Apr 2023 22:05)

## 2023-04-13 NOTE — DIETITIAN INITIAL EVALUATION ADULT - REASON INDICATOR FOR ASSESSMENT
RD consult for Pressure Injury Stage 2 or >.   Source: pt, medical record. Chart reviewed, events noted.

## 2023-04-13 NOTE — BH CONSULTATION LIAISON ASSESSMENT NOTE - DESCRIPTION
Lives in an apartment in [place] with her . She mentions having little support as her only daughter passed away 3 years ago and her  had a debilitating stroke with decreased functional status after the event. She mentions trying to take care of her  and herself is becoming a bit much, but luckily a home health aid has been helping to take care of her . She completed high school in the past and she was a /levi in the past. Denies serving in the  and denies history of violence/abuse.

## 2023-04-13 NOTE — DIETITIAN INITIAL EVALUATION ADULT - ORAL INTAKE PTA/DIET HISTORY
Pt reports good appetite/PO intake PTA, enjoys the food at [facility]. Was not following any therapeutic diet PTA.   Confirms NKFA.

## 2023-04-13 NOTE — PRE PROCEDURE NOTE - PRE PROCEDURE EVALUATION
Interventional Radiology Pre Procedure Note    HPI: 77y Female with concern for right hip infection. Aspiration is requested.     Allergies: No Known Allergies    Medications (Abx/Cardiac/Anticoagulation/Blood Products)  furosemide   Injectable: 40 milliGRAM(s) IV Push (04-11 @ 23:55)  furosemide   Injectable: 20 milliGRAM(s) IV Push (04-12 @ 06:27)  heparin   Injectable: 5000 Unit(s) SubCutaneous (04-12 @ 18:20)  hydrALAZINE: 100 milliGRAM(s) Oral (04-13 @ 13:15)  losartan: 100 milliGRAM(s) Oral (04-13 @ 05:30)  NIFEdipine XL: 60 milliGRAM(s) Oral (04-13 @ 05:29)  rifAMPin: 300 milliGRAM(s) Oral (04-12 @ 06:26)  vancomycin  IVPB: 250 mL/Hr IV Intermittent (04-13 @ 05:29)    Data:    T(C): 36.7  HR: 64  BP: 145/63  RR: 18  SpO2: 94%    Exam  General: No acute distress  Chest: Non labored breathing    -WBC 6.45 / HgB 10.1 / Hct 32.7 / Plt 222  -Na 140 / Cl 100 / BUN 24 / Glucose 83  -K 4.7 / CO2 32 / Cr 0.78  -ALT -- / Alk Phos -- / T.Bili --  -INR1.14    Plan: 77y Female presents for right hip aspiration. Procedure and risks discussed with patient and she is agreeable to proceed.   -Risks/Benefits/alternatives explained with the patient and/or healthcare proxy and witnessed informed consent obtained.

## 2023-04-13 NOTE — CONSULT NOTE ADULT - SUBJECTIVE AND OBJECTIVE BOX
DATE OF SERVICE: 04-13-23 @ 13:17    CHIEF COMPLAINT:Patient is a 77y old  Female who presents with a chief complaint of Sent in from [facility] for worsening RIGHT hip pain (13 Apr 2023 13:09)      HISTORY OF PRESENT ILLNESS: 78 y/o F--history of revision of a RIGHT USAMA in April 2019 with second revision of RIGHT hip Sept 2019 in the setting of patient with a history of essential HTN on multiple medications including Cozaar, hydralazine, Nifedipine ER, chronic anxiety disorder (see [place] I Stop) on PRN Klonopin, chronic sleep disorder on longstanding Ambien, with patient on chronic oral MSO4 and PRN Percocet apparently in the setting of patient with a recent hospitalisation at [facility] 3 weeks ago in the setting of apparent MRSA bacteremia with patient on IV vancomycin 750 mg Q12H with rifampin as a synergistic agent.  The patient is also on Neurontin for pain modulation.  Patient with severe pain despite oral Rx at [facility] and was sent to the ER.   Patient seen by ortho in the ER.   Patient denies fever, chills, rigors.  No chest pain/pressure.  NO palpitations.   Patient denies dyspnoea but with poor exercise capacity>>apparently with transient hypoxaemia  in the ER and placed on 3 Litres O2 NC and given Lasix 40 mg IV x 1, Duoneb.    Patient presently denies abdominal pain, no red blood per rectum or melena.  NO diaphoresis. (12 Apr 2023 02:44)      PAST MEDICAL & SURGICAL HISTORY:  MRSA bacteremia      Essential hypertension      CAD (coronary artery disease)      Elevated brain natriuretic peptide (BNP) level      History of arthroplasty of right hip              MEDICATIONS:  furosemide   Injectable 20 milliGRAM(s) IV Push daily  heparin   Injectable 5000 Unit(s) SubCutaneous every 12 hours  hydrALAZINE 100 milliGRAM(s) Oral every 8 hours  losartan 100 milliGRAM(s) Oral daily  NIFEdipine XL 60 milliGRAM(s) Oral daily    vancomycin  IVPB 750 milliGRAM(s) IV Intermittent every 12 hours    albuterol/ipratropium for Nebulization 3 milliLiter(s) Nebulizer every 6 hours    acetaminophen     Tablet .. 650 milliGRAM(s) Oral every 6 hours PRN  clonazePAM  Tablet 0.5 milliGRAM(s) Oral two times a day PRN  gabapentin 400 milliGRAM(s) Oral three times a day  HYDROmorphone   Tablet 2 milliGRAM(s) Oral every 8 hours PRN  zolpidem 5 milliGRAM(s) Oral at bedtime PRN        chlorhexidine 2% Cloths 1 Application(s) Topical daily  lidocaine   4% Patch 1 Patch Transdermal daily  multivitamin 1 Tablet(s) Oral daily      FAMILY HISTORY:  Family history of essential hypertension        Non-contributory    SOCIAL HISTORY:    [ +] Tobacco  [ -] Drugs  [ -] Alcohol    Allergies    No Known Allergies    Intolerances    	    REVIEW OF SYSTEMS:  CONSTITUTIONAL: No fever  EYES: No eye pain, visual disturbances, or discharge  ENMT:  No difficulty hearing, tinnitus  NECK: No pain or stiffness  RESPIRATORY: No cough, wheezing, + SOB  CARDIOVASCULAR: No chest pain, palpitations, passing out, dizziness, or leg swelling  GASTROINTESTINAL:  No nausea, vomiting, diarrhea or constipation. No melena.  GENITOURINARY: No dysuria, hematuria  NEUROLOGICAL: No stroke like symptoms  SKIN: No burning or lesions   ENDOCRINE: No heat or cold intolerance  MUSCULOSKELETAL: No joint pain or swelling  PSYCHIATRIC: No  anxiety, mood swings  HEME/LYMPH: No bleeding gums  ALLERGY AND IMMUNOLOGIC: No hives or eczema	    All other ROS negative    PHYSICAL EXAM:  T(C): 36.7 (04-13-23 @ 12:22), Max: 36.8 (04-12-23 @ 20:13)  HR: 74 (04-13-23 @ 12:22) (70 - 74)  BP: 145/63 (04-13-23 @ 12:22) (145/63 - 169/73)  RR: 18 (04-13-23 @ 12:22) (16 - 18)  SpO2: 93% (04-13-23 @ 12:22) (93% - 97%)  Wt(kg): --  I&O's Summary    12 Apr 2023 07:01  -  13 Apr 2023 07:00  --------------------------------------------------------  IN: 240 mL / OUT: 800 mL / NET: -560 mL    13 Apr 2023 07:01  -  13 Apr 2023 13:17  --------------------------------------------------------  IN: 0 mL / OUT: 700 mL / NET: -700 mL        Appearance: Normal	  HEENT:   Normal oral mucosa, EOMI	  Cardiovascular:  S1 S2, No JVD,    Respiratory: Lungs clear to auscultation	  Psychiatry: Alert  Gastrointestinal:  Soft, Non-tender, + BS	  Skin: No rashes   Neurologic: Non-focal  Extremities:  No edema  Vascular: Peripheral pulses palpable    	    	  	  CARDIAC MARKERS:  Labs personally reviewed by me                                  10.1   6.45  )-----------( 222      ( 12 Apr 2023 11:01 )             32.7     04-13    140  |  100  |  24<H>  ----------------------------<  83  4.7   |  32<H>  |  0.78    Ca    9.0      13 Apr 2023 07:34  Phos  4.4     04-13  Mg     2.2     04-13    TPro  6.9  /  Alb  3.1<L>  /  TBili  0.3  /  DBili  x   /  AST  13  /  ALT  14  /  AlkPhos  92  04-11          EKG: Personally reviewed by me - NSR  Radiology: Personally reviewed by me -     < from: Xray Chest 1 View- PORTABLE-Urgent (04.11.23 @ 20:45) >  Right-sided PICC line with it's tip in the SVC.  The heart is normal in size. Aortic calcifications.  Small left pleural effusion and associated atelectasis.  No pneumothorax.  There are noacute osseous abnormalities.    IMPRESSION:  Small left pleural effusion and associated atelectasis.    Right-sided PICC line with it's tip in the SVC.    < end of copied text >  < from: CT Femur No Cont, Right (04.11.23 @ 23:38) >  IMPRESSION:  1.  Revision type right total hip arthroplasty is present with partial   absence of the proximal femur, extensive heterotopic ossification around   the proximal femoral stem, lucency around the femoral stem, and smooth   periosteal thickening of the mid femoral diaphysis. Changes may be   related to loosening or infection in the appropriate setting.  2.  Moderate to severe subcutaneous edema is present without discrete   fluid collection or gas.  3.  Correlation with prior imaging is suggested to assess stability of   these findings.    < end of copied text >  < from: TTE W or WO Ultrasound Enhancing Agent (04.12.23 @ 08:27) >  CONCLUSIONS:      1. Normal left ventricular cavity size. The left ventricular wall thickness is normal. The left ventricular systolic function is hyperdynamic. There are no regional wall motion abnormalities seen.   2. Normal right ventricular cavity size and normal systolic function.   3. Moderate aortic stenosis.   4. Mild to moderate pulmonary hypertension.   5. No echocardiographic evidence of vegetations.    < end of copied text >  < from: TTE W or WO Ultrasound Enhancing Agent (04.12.23 @ 08:27) >  Aortic Valve:  The aortic valve is tricuspid with normal structure without stenosis. There is mild calcification of the aortic valve leaflets. There is moderate aortic stenosis. There is mild-to-moderate aortic regurgitation. AI VMax is 4.16 m/s. AI pressure half time is 402 msec. AI slope is 2.41 m/s².    < end of copied text >      Assessment /Plan:     78 y/o F--history of revision of a RIGHT USAMA in April 2019 with second revision of RIGHT hip Sept 2019, HTN, chronic anxiety disorder, recent hospitalization at [facility] 3 weeks ago for MRSA bacteremia with patient on IV vancomycin 750 mg Q12H with rifampin as a synergistic agent. Patient denies fever, chills, rigors.  No chest pain/pressure.  NO palpitations.   Patient denies dyspnoea but with poor exercise capacity.     1. MRSA bacteremia  - Blood Cx 4/11 NGTD  - Afebrile, no leukocytosis  - TRUDY if Cultures repeat + and if ID recommends  - TTE shows preserved EF, no evidence of vegetations    2. Chronic Diastolic Heart Failure  - CXR shows small left pleural effusion  - BNP 3206  - TTE shows preserved EF, no WMA, moderate AS  - c/w Lasix 20mg IVP daily    3. Hypertension  - c/w losartan 100mg daily  - c/w hydralazine 100mg PO TID  - c/w Nifedipine 60mg PO daily    4. DVT ppx  - c/w SQ Heparin  Differential diagnosis and plan of care discussed with patient after the evaluation. Counseling on diet, nutritional counseling, weight management, exercise and medication compliance was done.   Advanced care planning/advanced directives discussed with patient/family. DNR status including forceful chest compressions to attempt to restart the heart, ventilator support/artificial breathing, electric shock, artificial nutrition, health care proxy, Molst form all discussed with pt. Pt wishes to consider. More than fifteen minutes spent on discussing advanced directives.     Amarilys Vásquez [facility]  Domenico Christianson DO [facility]  Cardiovascular Medicine  [address] [place] [address] Dr, [address]  Available for call or text via [employer]  Office [phone]   DATE OF SERVICE: 04-13-23 @ 13:17    CHIEF COMPLAINT :Patient is a 77y old  Female who presents with a chief complaint of Sent in from [facility] for worsening RIGHT hip pain (13 Apr 2023 13:09)      HISTORY OF PRESENT ILLNESS: 78 y/o F--history of revision of a RIGHT USAMA in April 2019 with second revision of RIGHT hip Sept 2019 in the setting of patient with a history of essential HTN on multiple medications including Cozaar, hydralazine, Nifedipine ER, chronic anxiety disorder (see [place] Stop[place] on PRN Klonopin, chronic sleep disorder on longstanding Ambien, with patient on chronic oral MSO4 and PRN Percocet apparently in the setting of patient with a recent hospitalisation at [facility] 3 weeks ago in the setting of apparent MRSA bacteremia with patient on IV vancomycin 750 mg Q12H with rifampin as a synergistic agent.  The patient is also on Neurontin for pain modulation.  Patient with severe pain despite oral Rx at [facility] and was sent to the ER.   Patient seen by ortho in the ER.   Patient denies fever, chills, rigors.  No chest pain/pressure.  NO palpitations.   Patient denies dyspnoea but with poor exercise capacity>>apparently with transient hypoxaemia  in the ER and placed on 3 Litres O2 NC and given Lasix 40 mg IV x 1, Duoneb.    Patient presently denies abdominal pain, no red blood per rectum or melena.  NO diaphoresis. (12 Apr 2023 02:44)      PAST MEDICAL & SURGICAL HISTORY:  MRSA bacteremia      Essential hypertension      CAD (coronary artery disease)      Elevated brain natriuretic peptide (BNP) level      History of arthroplasty of right hip              MEDICATIONS:  furosemide   Injectable 20 milliGRAM(s) IV Push daily  heparin   Injectable 5000 Unit(s) SubCutaneous every 12 hours  hydrALAZINE 100 milliGRAM(s) Oral every 8 hours  losartan 100 milliGRAM(s) Oral daily  NIFEdipine XL 60 milliGRAM(s) Oral daily    vancomycin  IVPB 750 milliGRAM(s) IV Intermittent every 12 hours    albuterol/ipratropium for Nebulization 3 milliLiter(s) Nebulizer every 6 hours    acetaminophen     Tablet .. 650 milliGRAM(s) Oral every 6 hours PRN  clonazePAM  Tablet 0.5 milliGRAM(s) Oral two times a day PRN  gabapentin 400 milliGRAM(s) Oral three times a day  HYDROmorphone   Tablet 2 milliGRAM(s) Oral every 8 hours PRN  zolpidem 5 milliGRAM(s) Oral at bedtime PRN        chlorhexidine 2% Cloths 1 Application(s) Topical daily  lidocaine   4% Patch 1 Patch Transdermal daily  multivitamin 1 Tablet(s) Oral daily      FAMILY HISTORY:  Family history of essential hypertension        Non-contributory    SOCIAL HISTORY:    [ +] Tobacco  [ -] Drugs  [ -] Alcohol    Allergies    No Known Allergies    Intolerances    	    REVIEW OF SYSTEMS:  CONSTITUTIONAL: No fever  EYES: No eye pain, visual disturbances, or discharge  ENMT:  No difficulty hearing, tinnitus  NECK: No pain or stiffness  RESPIRATORY: No cough, wheezing, + SOB  CARDIOVASCULAR: No chest pain, palpitations, passing out, dizziness, or leg swelling  GASTROINTESTINAL:  No nausea, vomiting, diarrhea or constipation. No melena.  GENITOURINARY: No dysuria, hematuria  NEUROLOGICAL: No stroke like symptoms  SKIN: No burning or lesions   ENDOCRINE: No heat or cold intolerance  MUSCULOSKELETAL: No joint pain or swelling  PSYCHIATRIC: No  anxiety, mood swings  HEME/LYMPH: No bleeding gums  ALLERGY AND IMMUNOLOGIC: No hives or eczema	    All other ROS negative    PHYSICAL EXAM:  T(C): 36.7 (04-13-23 @ 12:22), Max: 36.8 (04-12-23 @ 20:13)  HR: 74 (04-13-23 @ 12:22) (70 - 74)  BP: 145/63 (04-13-23 @ 12:22) (145/63 - 169/73)  RR: 18 (04-13-23 @ 12:22) (16 - 18)  SpO2: 93% (04-13-23 @ 12:22) (93% - 97%)  Wt(kg): --  I&O's Summary    12 Apr 2023 07:01  -  13 Apr 2023 07:00  --------------------------------------------------------  IN: 240 mL / OUT: 800 mL / NET: -560 mL    13 Apr 2023 07:01  -  13 Apr 2023 13:17  --------------------------------------------------------  IN: 0 mL / OUT: 700 mL / NET: -700 mL        Appearance: Normal	  HEENT:   Normal oral mucosa, EOMI	  Cardiovascular:  S1 S2, No JVD,    Respiratory: Lungs clear to auscultation	  Psychiatry: Alert  Gastrointestinal:  Soft, Non-tender, + BS	  Skin: No rashes   Neurologic: Non-focal  Extremities:  No edema  Vascular: Peripheral pulses palpable    	    	  	  CARDIAC MARKERS:  Labs personally reviewed by me                                  10.1   6.45  )-----------( 222      ( 12 Apr 2023 11:01 )             32.7     04-13    140  |  100  |  24<H>  ----------------------------<  83  4.7   |  32<H>  |  0.78    Ca    9.0      13 Apr 2023 07:34  Phos  4.4     04-13  Mg     2.2     04-13    TPro  6.9  /  Alb  3.1<L>  /  TBili  0.3  /  DBili  x   /  AST  13  /  ALT  14  /  AlkPhos  92  04-11          EKG: Personally reviewed by me - NSR  Radiology: Personally reviewed by me -     < from: Xray Chest 1 View- PORTABLE-Urgent (04.11.23 @ 20:45) >  Right-sided PICC line with it's tip in the SVC.  The heart is normal in size. Aortic calcifications.  Small left pleural effusion and associated atelectasis.  No pneumothorax.  There are noacute osseous abnormalities.    IMPRESSION:  Small left pleural effusion and associated atelectasis.    Right-sided PICC line with it's tip in the SVC.    < end of copied text >  < from: CT Femur No Cont, Right (04.11.23 @ 23:38) >  IMPRESSION:  1.  Revision type right total hip arthroplasty is present with partial   absence of the proximal femur, extensive heterotopic ossification around   the proximal femoral stem, lucency around the femoral stem, and smooth   periosteal thickening of the mid femoral diaphysis. Changes may be   related to loosening or infection in the appropriate setting.  2.  Moderate to severe subcutaneous edema is present without discrete   fluid collection or gas.  3.  Correlation with prior imaging is suggested to assess stability of   these findings.    < end of copied text >  < from: TTE W or WO Ultrasound Enhancing Agent (04.12.23 @ 08:27) >  CONCLUSIONS:      1. Normal left ventricular cavity size. The left ventricular wall thickness is normal. The left ventricular systolic function is hyperdynamic. There are no regional wall motion abnormalities seen.   2. Normal right ventricular cavity size and normal systolic function.   3. Moderate aortic stenosis.   4. Mild to moderate pulmonary hypertension.   5. No echocardiographic evidence of vegetations.    < end of copied text >  < from: TTE W or WO Ultrasound Enhancing Agent (04.12.23 @ 08:27) >  Aortic Valve:  The aortic valve is tricuspid with normal structure without stenosis. There is mild calcification of the aortic valve leaflets. There is moderate aortic stenosis. There is mild-to-moderate aortic regurgitation. AI VMax is 4.16 m/s. AI pressure half time is 402 msec. AI slope is 2.41 m/s².    < end of copied text >      Assessment /Plan:     78 y/o F--history of revision of a RIGHT USAMA in April 2019 with second revision of RIGHT hip Sept 2019, HTN, chronic anxiety disorder, recent hospitalization at [facility] 3 weeks ago for MRSA bacteremia with patient on IV vancomycin 750 mg Q12H with rifampin as a synergistic agent. Patient denies fever, chills, rigors.  No chest pain/pressure.  NO palpitations.   Patient denies dyspnoea but with poor exercise capacity.     1. MRSA bacteremia  - Blood Cx 4/11 NGTD  - Afebrile, no leukocytosis  - TRUDY if Cultures repeat + and if ID recommends  - TTE shows preserved EF, no evidence of vegetations    2. Chronic Diastolic Heart Failure  - CXR shows small left pleural effusion  - BNP 3206  - TTE shows preserved EF, no WMA, moderate AS  - c/w Lasix 20mg IVP daily, approaching euvolemia, switch to PO Lasix likely Friday    3. Hypertension  - c/w losartan 100mg daily  - c/w hydralazine 100mg PO TID  - c/w Nifedipine 60mg PO daily    4. DVT ppx  - c/w SQ Heparin        Differential diagnosis and plan of care discussed with patient after the evaluation. Counseling on diet, nutritional counseling, weight management, exercise and medication compliance was done.   Advanced care planning/advanced directives discussed with patient/family. DNR status including forceful chest compressions to attempt to restart the heart, ventilator support/artificial breathing, electric shock, artificial nutrition, health care proxy, Molst form all discussed with pt. Pt wishes to consider. More than fifteen minutes spent on discussing advanced directives.     Amarilys Vásquez [facility]  Domenico Christianson DO [facility]  Cardiovascular Medicine  [address] [place] [address] Dr, [address]  Available for call or text via [employer]  Office [phone]

## 2023-04-13 NOTE — DISCHARGE NOTE PROVIDER - NSDCCPCAREPLAN_GEN_ALL_CORE_FT
PRINCIPAL DISCHARGE DIAGNOSIS  Diagnosis: MRSA bacteremia  Assessment and Plan of Treatment: You are being discharged on IV antibiotics.  You will need to continue taking these antibiotics until May 14.      SECONDARY DISCHARGE DIAGNOSES  Diagnosis: COPD exacerbation  Assessment and Plan of Treatment: Call your Health Care provider upon arrival home to make a follow up appointment within one week.  Take all inhalers as prescribed by your Health Care Provider.  Take steroids as prescribed by your Health Care Provider.  If your cough increases infrequency and severity and/or you have shortness of breath or increased shortness of breath call your Health Care Provider.  If you develop fever, chills, night sweats, malaise, and/or change in mental status call your Health care Provider.  Nutrition is very important.  Eat small frequent meals.  Increase your activity as tolerated.  Do not stay in bed all day     PRINCIPAL DISCHARGE DIAGNOSIS  Diagnosis: MRSA bacteremia  Assessment and Plan of Treatment: You are being discharged on IV antibiotics.  You will need to continue taking these antibiotics until May 14.  You will need weekly CBC, CMP, and CPK labs checked and sent to Infectious Disease doctor for review      SECONDARY DISCHARGE DIAGNOSES  Diagnosis: COPD exacerbation  Assessment and Plan of Treatment: Call your Health Care provider upon arrival home to make a follow up appointment within one week.  Take all inhalers as prescribed by your Health Care Provider.  Take steroids as prescribed by your Health Care Provider.  If your cough increases infrequency and severity and/or you have shortness of breath or increased shortness of breath call your Health Care Provider.  If you develop fever, chills, night sweats, malaise, and/or change in mental status call your Health care Provider.  Nutrition is very important.  Eat small frequent meals.  Increase your activity as tolerated.  Do not stay in bed all day    Diagnosis: Chronic HFrEF (heart failure with reduced ejection fraction)  Assessment and Plan of Treatment: You were given IV lasix/diuretics to help optimize your volume status  Please continue your oral lasix regimen  You are recommended for low salt diet and should watch your fluid/water intake

## 2023-04-13 NOTE — CONSULT NOTE ADULT - SUBJECTIVE AND OBJECTIVE BOX
Interventional Radiology    Evaluate for Procedure: Right hip aspiration    HPI: 78 yo female with PMH w/ PMH of R USAMA (c/b multiple infections, and revisions), HTN, HLD c/o R hip pain for 1 month, with MRSA bacteremia.    Allergies: No Known Allergies    Medications (Abx/Cardiac/Anticoagulation/Blood Products)  furosemide   Injectable: 40 milliGRAM(s) IV Push (04-11 @ 23:55)  furosemide   Injectable: 20 milliGRAM(s) IV Push (04-12 @ 06:27)  heparin   Injectable: 5000 Unit(s) SubCutaneous (04-12 @ 18:20)  hydrALAZINE: 100 milliGRAM(s) Oral (04-13 @ 05:29)  losartan: 100 milliGRAM(s) Oral (04-13 @ 05:30)  NIFEdipine XL: 60 milliGRAM(s) Oral (04-13 @ 05:29)  rifAMPin: 300 milliGRAM(s) Oral (04-12 @ 06:26)  vancomycin  IVPB: 250 mL/Hr IV Intermittent (04-13 @ 05:29)    Data:    T(C): 36.7  HR: 74  BP: 145/63  RR: 18  SpO2: 93%    -WBC 6.45 / HgB 10.1 / Hct 32.7 / Plt 222  -Na 140 / Cl 100 / BUN 24 / Glucose 83  -K 4.7 / CO2 32 / Cr 0.78  -ALT -- / Alk Phos -- / T.Bili --  -INR 1.14 / PTT 33.9    Radiology: Reviewed    Assessment/Plan:   78 yo female with PMH w/ PMH of R USAMA (c/b multiple infections, and revisions), HTN, HLD c/o R hip pain for 1 month, with MRSA bacteremia.    - case reviewed and approved for 4/13  - please place IR procedure order under Dr. Nation  - COVID PCR results within 5 days of planned procedure  - d/w primary team

## 2023-04-13 NOTE — DISCHARGE NOTE PROVIDER - CARE PROVIDER_API CALL
Facility Provider,   Phone: (   )    -  Fax: (   )    -  Follow Up Time: 1-3 days   Facility Provider,   Phone: (   )    -  Fax: (   )    -  Follow Up Time: 1-3 days    Aurelio Cabrales)  Infectious Disease; Internal Medicine  [address] [place] [address]  Phone: [phone]  Fax: [phone]  Follow Up Time: 2 weeks    Domenico Christianson ()  Cardiovascular Disease; Internal Medicine; Nuclear Cardiology  [address] [facility], [address]  [place] [address]  Phone: [phone]  Fax: [phone]  Follow Up Time: 1 month

## 2023-04-13 NOTE — DISCHARGE NOTE PROVIDER - CARE PROVIDERS DIRECT ADDRESSES
,DirectAddress_Unknown ,DirectAddress_Unknown,[email or web address],DirectAddress_Unknown

## 2023-04-13 NOTE — DISCHARGE NOTE PROVIDER - PROVIDER TOKENS
FREE:[LAST:[Facility Provider],PHONE:[(   )    -],FAX:[(   )    -],FOLLOWUP:[1-3 days]] FREE:[LAST:[Facility Provider],PHONE:[(   )    -],FAX:[(   )    -],FOLLOWUP:[1-3 days]],PROVIDER:[TOKEN:[[id number]],FOLLOWUP:[2 weeks]],PROVIDER:[TOKEN:[[id number]],FOLLOWUP:[1 month]]

## 2023-04-13 NOTE — BH CONSULTATION LIAISON ASSESSMENT NOTE - RISK ASSESSMENT
Risk factors include her acute medical condition and lack of social support. Protective factors include residential/relationship stability, sobriety, access to health care services, and good insight into her condition.

## 2023-04-13 NOTE — DISCHARGE NOTE PROVIDER - NSDCFUADDINST_GEN_ALL_CORE_FT
Continue course of IV antibiotic through 5/14.  You will need to have weekly labs including CBC, CMP and CPK weekly starting 7-days from today until you complete your antibiotic course.

## 2023-04-13 NOTE — BH CONSULTATION LIAISON ASSESSMENT NOTE - PAST PSYCHOTROPIC MEDICATION
xanax (1 mg) and klonopin (2 mg)  xanax (1 mg) and klonopin (2 mg)  As per I-STOP Reference #: [id number] patient was recently on Klonopin 0.5mg p.o. BID

## 2023-04-13 NOTE — CONSULT NOTE ADULT - ATTENDING COMMENTS
Patient seen at bedside for initial consult.  Going to IR procedure on hip today.  Pressure from external rotation likely contributing, z-floats ordered to offload.  Santyl and DSD.  Local wound care at this time, on abx for hip as well. Vasc studies pending. Will follow.

## 2023-04-13 NOTE — PROGRESS NOTE ADULT - SUBJECTIVE AND OBJECTIVE BOX
77yPatient is a 77y old  Female who presents with a chief complaint of Sent in from [facility] for worsening RIGHT hip pain (13 Apr 2023 16:08)      Interval history:  Afebrile, anxious, complains of pain in rt leg.       Allergies:   No Known Allergies      Antimicrobials:  vancomycin  IVPB 750 milliGRAM(s) IV Intermittent every 12 hours      REVIEW OF SYSTEMS:  No chest pain   No SOB  No abdominal pain  No dysuria   No rash.       Vital Signs Last 24 Hrs  T(C): 36.7 (04-13-23 @ 12:22), Max: 36.8 (04-12-23 @ 20:13)  T(F): 98.1 (04-13-23 @ 12:22), Max: 98.2 (04-12-23 @ 20:13)  HR: 64 (04-13-23 @ 16:36) (64 - 74)  BP: 146/82 (04-13-23 @ 16:36) (145/63 - 169/73)  BP(mean): --  RR: 18 (04-13-23 @ 12:22) (16 - 18)  SpO2: 94% (04-13-23 @ 16:36) (93% - 97%)      PHYSICAL EXAM:  Pt in no acute distress, alert, awake.   non distended abdomen  no edema LE   no phlebitis                             10.1   6.45  )-----------( 222      ( 12 Apr 2023 11:01 )             32.7   04-13    140  |  100  |  24<H>  ----------------------------<  83  4.7   |  32<H>  |  0.78    Ca    9.0      13 Apr 2023 07:34  Phos  4.4     04-13  Mg     2.2     04-13    TPro  6.9  /  Alb  3.1<L>  /  TBili  0.3  /  DBili  x   /  AST  13  /  ALT  14  /  AlkPhos  92  04-11      LIVER FUNCTIONS - ( 11 Apr 2023 21:11 )  Alb: 3.1 g/dL / Pro: 6.9 g/dL / ALK PHOS: 92 U/L / ALT: 14 U/L / AST: 13 U/L / GGT: x               Culture - Blood (collected 12 Apr 2023 11:02)  Source: .Blood Blood-Venous  Preliminary Report (13 Apr 2023 17:02):    No growth to date.    Culture - Blood (collected 11 Apr 2023 20:50)  Source: .Blood Blood-Peripheral  Preliminary Report (13 Apr 2023 01:02):    No growth to date.    Culture - Blood (collected 11 Apr 2023 20:20)  Source: .Blood Blood-Peripheral  Preliminary Report (13 Apr 2023 01:02):    No growth to date.        Radiology:    < from: Xray Ankle Complete 3 Views, Right (04.13.23 @ 14:34) >    IMPRESSION:  Focal soft tissue defect/ulceration over lateral malleolus without   adjacent tracking gas collections or gross radiographic evidence for   underlying osteomyelitis however if there is further concern, MRI   suggested to further assess as warranted.    No fractures or dislocations.    Congruent ankle mortise with smooth and intact talar dome. Preserved   remaining visualized joint spaces and no joint margin erosions.    Tiny plantar calcaneal enthesophyte.    Generalized osteopenia otherwise no discrete suspicious lytic or blastic   lesions.

## 2023-04-13 NOTE — PROGRESS NOTE ADULT - PROBLEM SELECTOR PLAN 1
s/p THR (c/b multiple infections and revisions) w/ R hip pain for 1 month. Afebrile, no leukocytosis ESR 70 CRP 4.2. 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 MRSA infection of the hip last time which lead to surgery.    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 Changes may be related to loosening or infection in the appropriate setting.    - IR aspiration of R hip today, discussed with IR resident  - C/w Vancomycin, renally dose   - Pain management   - BCLX NGTD   - WBAT, PT/OT as tolerated   - Ortho following, no surgical intervention at this time   - ID and Podiatry following

## 2023-04-13 NOTE — CONSULT NOTE ADULT - SUBJECTIVE AND OBJECTIVE BOX
Podiatry pager #: [phone]    Patient is a 77y old  Female who presents with a chief complaint of Sent in from [facility] for worsening RIGHT hip pain (12 Apr 2023 17:40)      HPI:  76 y/o F--history of revision of a RIGHT USAMA in April 2019 with second revision of RIGHT hip Sept 2019 in the setting of patient with a history of essential HTN on multiple medications including Cozaar, hydralazine, Nifedipine ER, chronic anxiety disorder (see [place] I Stop) on PRN Klonopin, chronic sleep disorder on longstanding Ambien, with patient on chronic oral MSO4 and PRN Percocet apparently in the setting of patient with a recent hospitalization at [facility] 3 weeks ago in the setting of apparent MRSA bacteremia with patient on IV vancomycin 750 mg Q12H with rifampin as a synergistic agent.  The patient is also on Neurontin for pain modulation.  Patient with severe pain despite oral Rx at [facility] and was sent to the ER.       Podiatry consulted for evaluation of R ankle wound. Pt states wound was first noticed 2 months ago. Denies trauma. Denies treatment for wound previously. Admits to mild pain, no drainage or swelling.      PAST MEDICAL & SURGICAL HISTORY:  MRSA bacteremia      Essential hypertension      CAD (coronary artery disease)      Elevated brain natriuretic peptide (BNP) level      History of arthroplasty of right hip          MEDICATIONS  (STANDING):  albuterol/ipratropium for Nebulization 3 milliLiter(s) Nebulizer every 6 hours  chlorhexidine 2% Cloths 1 Application(s) Topical daily  furosemide   Injectable 20 milliGRAM(s) IV Push daily  gabapentin 400 milliGRAM(s) Oral three times a day  heparin   Injectable 5000 Unit(s) SubCutaneous every 12 hours  hydrALAZINE 100 milliGRAM(s) Oral every 8 hours  lidocaine   4% Patch 1 Patch Transdermal daily  losartan 100 milliGRAM(s) Oral daily  multivitamin 1 Tablet(s) Oral daily  NIFEdipine XL 60 milliGRAM(s) Oral daily  vancomycin  IVPB 750 milliGRAM(s) IV Intermittent every 12 hours    MEDICATIONS  (PRN):  acetaminophen     Tablet .. 650 milliGRAM(s) Oral every 6 hours PRN Temp greater or equal to 38C (100.4F), Mild Pain (1 - 3)  clonazePAM  Tablet 0.5 milliGRAM(s) Oral two times a day PRN for anxiety  HYDROmorphone   Tablet 2 milliGRAM(s) Oral every 8 hours PRN Severe Pain (7 - 10)  zolpidem 5 milliGRAM(s) Oral at bedtime PRN Insomnia      Allergies    No Known Allergies    Intolerances        VITALS:    Vital Signs Last 24 Hrs  T(C): 36.7 (13 Apr 2023 12:22), Max: 36.8 (12 Apr 2023 20:13)  T(F): 98.1 (13 Apr 2023 12:22), Max: 98.2 (12 Apr 2023 20:13)  HR: 74 (13 Apr 2023 12:22) (70 - 74)  BP: 145/63 (13 Apr 2023 12:22) (145/63 - 169/73)  BP(mean): --  RR: 18 (13 Apr 2023 12:22) (16 - 18)  SpO2: 93% (13 Apr 2023 12:22) (93% - 97%)    Parameters below as of 13 Apr 2023 12:22  Patient On (Oxygen Delivery Method): nasal cannula  O2 Flow (L/min): 2      LABS:                          10.1   6.45  )-----------( 222      ( 12 Apr 2023 11:01 )             32.7       04-13    140  |  100  |  24<H>  ----------------------------<  83  4.7   |  32<H>  |  0.78    Ca    9.0      13 Apr 2023 07:34  Phos  4.4     04-13  Mg     2.2     04-13    TPro  6.9  /  Alb  3.1<L>  /  TBili  0.3  /  DBili  x   /  AST  13  /  ALT  14  /  AlkPhos  92  04-11      CAPILLARY BLOOD GLUCOSE      POCT Blood Glucose.: 91 mg/dL (12 Apr 2023 22:05)      PT/INR - ( 11 Apr 2023 21:11 )   PT: 13.3 sec;   INR: 1.14 ratio         PTT - ( 11 Apr 2023 21:11 )  PTT:33.9 sec    LOWER EXTREMITY PHYSICAL EXAM:    Vasular: DP/PT 0/4 R, DP/PT 1/4 L, CFT < 3 seconds B/L, Temperature gradient warm to cool, B/L.   Neuro: Epicritic sensation intact to the level of the ankle, B/L.  Musculoskeletal/Ortho: s/p R USAMA & revision 9/2022  Skin: R lateral malleolus wound to level of capsule, scant fibrotic periphery, bed fibrogranular, indurated borders, no malodor or purulence.    RADIOLOGY & ADDITIONAL STUDIES:

## 2023-04-13 NOTE — PROGRESS NOTE ADULT - ASSESSMENT
76 y/o F--history of revision of a RIGHT USAMA in April 2019 with second revision of RIGHT hip Sept 2019, essential HTN, chronic anxiety disorder with a recent hospitalization at [facility] 3 weeks ago in the setting of apparent MRSA bacteremia with patient on IV vancomycin 750 mg Q12H with rifampin as a synergistic agent. Patient with severe pain despite oral Rx at [facility] and was sent to the ER.      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  MRSA infection of the hip last time which lead to surgery.       PLAN:   C/w vanco, level therapeutic.   monitor vancomycin trough and creatinine to avoid nephrotoxicity and ensure efficacy   no need for rifampin, especially if the concern is hip as a source, pt with chronic OM, the treatment for chronic OM is surgical debridement and not abx.   Risk of interaction with other meds with rifampin very high.   follow up repeat blood cx, ntd  Ortho on board  planned for IR guided aspiration of the rt hip joint.   Ankle ulcer, s/p podiatry eval.   Need records form [facility].  TTE with no vegetations.         Aurelio Montoya  Please contact through MS Teams   If no response or past 5 pm/weekend call [phone].

## 2023-04-13 NOTE — DISCHARGE NOTE PROVIDER - NSDCMRMEDTOKEN_GEN_ALL_CORE_FT
acetaminophen 325 mg oral tablet: 2 tablet orally once a day for pain mgmt, 30-60 min prior to wound dressing  Albuterol (Eqv-ProAir HFA) 90 mcg/inh inhalation aerosol: 2 inhaled every 6 hours as needed for  shortness of breath and/or wheezing  Ambien 5 mg oral tablet: 1 orally once a day (at bedtime) as needed for  insomnia  butalbital/aspirin/caffeine 50 mg-325 mg-40 mg oral capsule: 2 orally every 8 hours as needed for  headache  clonazePAM 0.5 mg oral tablet: 1 orally 2 times a day as needed for  anxiety  Cozaar 100 mg oral tablet: 1 orally once a day  gabapentin 400 mg oral capsule: 1 orally 3 times a day  hydrALAZINE 100 mg oral tablet: 1 orally every 8 hours  morphine 20 mg/mL oral concentrate: 5 milligram(s) orally every 4 hours as needed for  severe pain  Multiple Vitamins oral tablet: 1 orally once a day  nabumetone 500 mg oral tablet: 1 tablet orally 2 times a day  NIFEdipine (Eqv-Adalat CC) 60 mg oral tablet, extended release: 1 orally once a day  Percocet 5 mg-325 mg oral tablet: 2 tab(s) orally every 4 hours as needed for  severe pain  Rifadin 300 mg oral capsule: 1 orally every 12 hours  vancomycin 750 mg intravenous injection: 500 intravenously every 12 hours   acetaminophen 325 mg oral tablet: 2 tablet orally once a day for pain mgmt, 30-60 min prior to wound dressing  Albuterol (Eqv-ProAir HFA) 90 mcg/inh inhalation aerosol: 2 inhaled every 6 hours as needed for  shortness of breath and/or wheezing  aluminum hydroxide-magnesium hydroxide 200 mg-200 mg/5 mL oral suspension: 30 milliliter(s) orally every 4 hours As needed Dyspepsia  ascorbic acid 500 mg oral tablet: 1 tab(s) orally once a day  aspirin 81 mg oral tablet, chewable: 1 tab(s) orally once a day  clonazePAM 0.5 mg oral tablet: 1 tab(s) orally every 8 hours As needed for anxiety  collagenase 250 units/g topical ointment: 1 Apply topically to affected area once a day  furosemide 40 mg oral tablet: 1 tab(s) orally once a day  gabapentin 400 mg oral capsule: 1 cap(s) orally 3 times a day  hydrALAZINE 100 mg oral tablet: 1 tab(s) orally every 8 hours  lidocaine 4% topical film: Apply topically to affected area once a day  losartan 100 mg oral tablet: 1 tab(s) orally once a day  melatonin 5 mg oral tablet: 1 tab(s) orally once a day (at bedtime)  mirtazapine 7.5 mg oral tablet: 1 tab(s) orally once a day (at bedtime)  Multiple Vitamins oral tablet: 1 tab(s) orally once a day  NIFEdipine 30 mg oral tablet, extended release: 1 tab(s) orally once a day  Percocet 5 mg-325 mg oral tablet: 2 tab(s) orally every 4 hours as needed for  severe pain  polyethylene glycol 3350 oral powder for reconstitution: 17 gram(s) orally 2 times a day  senna leaf extract oral tablet: 2 tab(s) orally once a day (at bedtime)  zolpidem 5 mg oral tablet: 1 tab(s) orally once a day (at bedtime) As needed Insomnia   acetaminophen 325 mg oral tablet: 2 tablet orally once a day for pain mgmt, 30-60 min prior to wound dressing  Albuterol (Eqv-ProAir HFA) 90 mcg/inh inhalation aerosol: 2 inhaled every 6 hours as needed for  shortness of breath and/or wheezing  aluminum hydroxide-magnesium hydroxide 200 mg-200 mg/5 mL oral suspension: 30 milliliter(s) orally every 4 hours As needed Dyspepsia  ascorbic acid 500 mg oral tablet: 1 tab(s) orally once a day  aspirin 81 mg oral tablet, chewable: 1 tab(s) orally once a day  clonazePAM 0.5 mg oral tablet: 1 tab(s) orally every 8 hours As needed for anxiety  collagenase 250 units/g topical ointment: 1 Apply topically to affected area once a day  furosemide 40 mg oral tablet: 1 tab(s) orally once a day  gabapentin 400 mg oral capsule: 1 cap(s) orally 3 times a day  hydrALAZINE 100 mg oral tablet: 1 tab(s) orally every 8 hours  lidocaine 4% topical film: Apply topically to affected area once a day  losartan 100 mg oral tablet: 1 tab(s) orally once a day  melatonin 5 mg oral tablet: 1 tab(s) orally once a day (at bedtime)  mirtazapine 7.5 mg oral tablet: 1 tab(s) orally once a day (at bedtime)  Multiple Vitamins oral tablet: 1 tab(s) orally once a day  Narcan 4 mg/0.1 mL nasal spray: 4 milligram(s) intranasally every 2 minutes as needed for overdose  NIFEdipine 30 mg oral tablet, extended release: 1 tab(s) orally once a day  Percocet 5 mg-325 mg oral tablet: 2 tab(s) orally every 4 hours as needed for  severe pain  polyethylene glycol 3350 oral powder for reconstitution: 17 gram(s) orally 2 times a day  senna leaf extract oral tablet: 2 tab(s) orally once a day (at bedtime)  zolpidem 5 mg oral tablet: 1 tab(s) orally once a day (at bedtime) As needed Insomnia

## 2023-04-14 LAB
ANION GAP SERPL CALC-SCNC: 9 MMOL/L — SIGNIFICANT CHANGE UP (ref 5–17)
BUN SERPL-MCNC: 28 MG/DL — HIGH (ref 7–23)
CALCIUM SERPL-MCNC: 8.9 MG/DL — SIGNIFICANT CHANGE UP (ref 8.4–10.5)
CHLORIDE SERPL-SCNC: 102 MMOL/L — SIGNIFICANT CHANGE UP (ref 96–108)
CO2 SERPL-SCNC: 28 MMOL/L — SIGNIFICANT CHANGE UP (ref 22–31)
CREAT SERPL-MCNC: 0.79 MG/DL — SIGNIFICANT CHANGE UP (ref 0.5–1.3)
EGFR: 78 ML/MIN/1.73M2 — SIGNIFICANT CHANGE UP
GLUCOSE SERPL-MCNC: 99 MG/DL — SIGNIFICANT CHANGE UP (ref 70–99)
GRAM STN FLD: SIGNIFICANT CHANGE UP
MAGNESIUM SERPL-MCNC: 2 MG/DL — SIGNIFICANT CHANGE UP (ref 1.6–2.6)
PHOSPHATE SERPL-MCNC: 4.4 MG/DL — SIGNIFICANT CHANGE UP (ref 2.5–4.5)
POTASSIUM SERPL-MCNC: 4.7 MMOL/L — SIGNIFICANT CHANGE UP (ref 3.5–5.3)
POTASSIUM SERPL-SCNC: 4.7 MMOL/L — SIGNIFICANT CHANGE UP (ref 3.5–5.3)
SODIUM SERPL-SCNC: 139 MMOL/L — SIGNIFICANT CHANGE UP (ref 135–145)
SPECIMEN SOURCE: SIGNIFICANT CHANGE UP

## 2023-04-14 PROCEDURE — 93923 UPR/LXTR ART STDY 3+ LVLS: CPT | Mod: 26

## 2023-04-14 PROCEDURE — 99232 SBSQ HOSP IP/OBS MODERATE 35: CPT

## 2023-04-14 RX ORDER — VANCOMYCIN HCL 1 G
750 VIAL (EA) INTRAVENOUS EVERY 24 HOURS
Refills: 0 | Status: DISCONTINUED | OUTPATIENT
Start: 2023-04-15 | End: 2023-04-17

## 2023-04-14 RX ADMIN — Medication 3 MILLILITER(S): at 12:38

## 2023-04-14 RX ADMIN — OXYCODONE AND ACETAMINOPHEN 1 TABLET(S): 5; 325 TABLET ORAL at 09:13

## 2023-04-14 RX ADMIN — Medication 3 MILLILITER(S): at 06:21

## 2023-04-14 RX ADMIN — Medication 20 MILLIGRAM(S): at 06:24

## 2023-04-14 RX ADMIN — GABAPENTIN 400 MILLIGRAM(S): 400 CAPSULE ORAL at 06:23

## 2023-04-14 RX ADMIN — OXYCODONE AND ACETAMINOPHEN 1 TABLET(S): 5; 325 TABLET ORAL at 10:03

## 2023-04-14 RX ADMIN — OXYCODONE AND ACETAMINOPHEN 1 TABLET(S): 5; 325 TABLET ORAL at 23:47

## 2023-04-14 RX ADMIN — OXYCODONE AND ACETAMINOPHEN 1 TABLET(S): 5; 325 TABLET ORAL at 16:47

## 2023-04-14 RX ADMIN — Medication 0.5 MILLIGRAM(S): at 13:57

## 2023-04-14 RX ADMIN — Medication 0.5 MILLIGRAM(S): at 18:57

## 2023-04-14 RX ADMIN — Medication 500 MILLIGRAM(S): at 12:40

## 2023-04-14 RX ADMIN — Medication 0.5 MILLIGRAM(S): at 06:22

## 2023-04-14 RX ADMIN — MIRTAZAPINE 7.5 MILLIGRAM(S): 45 TABLET, ORALLY DISINTEGRATING ORAL at 21:02

## 2023-04-14 RX ADMIN — GABAPENTIN 400 MILLIGRAM(S): 400 CAPSULE ORAL at 21:02

## 2023-04-14 RX ADMIN — Medication 0.5 MILLIGRAM(S): at 09:13

## 2023-04-14 RX ADMIN — Medication 100 MILLIGRAM(S): at 06:22

## 2023-04-14 RX ADMIN — Medication 100 MILLIGRAM(S): at 21:00

## 2023-04-14 RX ADMIN — Medication 100 MILLIGRAM(S): at 12:40

## 2023-04-14 RX ADMIN — OXYCODONE AND ACETAMINOPHEN 1 TABLET(S): 5; 325 TABLET ORAL at 15:34

## 2023-04-14 RX ADMIN — Medication 1 TABLET(S): at 12:39

## 2023-04-14 RX ADMIN — Medication 1 APPLICATION(S): at 12:39

## 2023-04-14 RX ADMIN — ZOLPIDEM TARTRATE 5 MILLIGRAM(S): 10 TABLET ORAL at 21:01

## 2023-04-14 RX ADMIN — Medication 60 MILLIGRAM(S): at 06:22

## 2023-04-14 RX ADMIN — OXYCODONE AND ACETAMINOPHEN 1 TABLET(S): 5; 325 TABLET ORAL at 21:01

## 2023-04-14 RX ADMIN — LOSARTAN POTASSIUM 100 MILLIGRAM(S): 100 TABLET, FILM COATED ORAL at 06:22

## 2023-04-14 RX ADMIN — GABAPENTIN 400 MILLIGRAM(S): 400 CAPSULE ORAL at 12:40

## 2023-04-14 RX ADMIN — Medication 250 MILLIGRAM(S): at 06:25

## 2023-04-14 RX ADMIN — CHLORHEXIDINE GLUCONATE 1 APPLICATION(S): 213 SOLUTION TOPICAL at 12:38

## 2023-04-14 NOTE — PROGRESS NOTE ADULT - SUBJECTIVE AND OBJECTIVE BOX
[facility] Division of Hospital Medicine  Jie Lozano MD  Available via MS Teams  Pager: [phone]    SUBJECTIVE / OVERNIGHT EVENTS: Patient seen and examined at bedside. No acute overnight events. Pt denies dyspnea, chest pain fevers, chills, cough, HA, dizziness, syncope, chest palpitations, abd pain, n/v/d, urinary or bowel changes, active sites of bleeding or any other symptoms at this time.    ADDITIONAL REVIEW OF SYSTEMS: negative     MEDICATIONS  (STANDING):  albuterol/ipratropium for Nebulization 3 milliLiter(s) Nebulizer every 6 hours  ascorbic acid 500 milliGRAM(s) Oral daily  chlorhexidine 2% Cloths 1 Application(s) Topical daily  clonazePAM  Tablet 0.5 milliGRAM(s) Oral two times a day  collagenase Ointment 1 Application(s) Topical daily  furosemide   Injectable 20 milliGRAM(s) IV Push daily  gabapentin 400 milliGRAM(s) Oral three times a day  heparin   Injectable 5000 Unit(s) SubCutaneous every 12 hours  hydrALAZINE 100 milliGRAM(s) Oral every 8 hours  lidocaine   4% Patch 1 Patch Transdermal daily  losartan 100 milliGRAM(s) Oral daily  melatonin 5 milliGRAM(s) Oral at bedtime  mirtazapine 7.5 milliGRAM(s) Oral at bedtime  multivitamin 1 Tablet(s) Oral daily  NIFEdipine XL 60 milliGRAM(s) Oral daily    MEDICATIONS  (PRN):  acetaminophen     Tablet .. 650 milliGRAM(s) Oral every 6 hours PRN Temp greater or equal to 38C (100.4F), Mild Pain (1 - 3)  clonazePAM  Tablet 0.5 milliGRAM(s) Oral two times a day PRN for anxiety  oxycodone    5 mG/acetaminophen 325 mG 1 Tablet(s) Oral every 6 hours PRN Severe Pain (7 - 10)  zolpidem 5 milliGRAM(s) Oral at bedtime PRN Insomnia      I&O's Summary    13 Apr 2023 07:01  -  14 Apr 2023 07:00  --------------------------------------------------------  IN: 480 mL / OUT: 2650 mL / NET: -2170 mL        PHYSICAL EXAM:  Vital Signs Last 24 Hrs  T(C): 36.3 (14 Apr 2023 06:05), Max: 36.6 (13 Apr 2023 20:30)  T(F): 97.4 (14 Apr 2023 06:05), Max: 97.8 (13 Apr 2023 20:30)  HR: 69 (14 Apr 2023 06:05) (64 - 78)  BP: 170/70 (14 Apr 2023 06:05) (143/68 - 170/70)  BP(mean): --  RR: 18 (14 Apr 2023 06:05) (18 - 18)  SpO2: 95% (14 Apr 2023 06:05) (92% - 95%)    Parameters below as of 14 Apr 2023 06:05  Patient On (Oxygen Delivery Method): nasal cannula  O2 Flow (L/min): 2      CONSTITUTIONAL: NAD, well-developed, well-groomed  EYES: PERRLA; conjunctiva and sclera clear  ENMT: Moist oral mucosa, no pharyngeal injection or exudates; normal dentition  NECK: Supple, no palpable masses; no thyromegaly  RESPIRATORY: Normal respiratory effort; lungs are clear to auscultation bilaterally  CARDIOVASCULAR: Regular rate and rhythm, normal S1 and S2, no murmur/rub/gallop; No lower extremity edema; Peripheral pulses are 2+ bilaterally  ABDOMEN: Nontender to palpation, normoactive bowel sounds, no rebound/guarding; No hepatosplenomegaly  MUSCULOSKELETAL:   UE/LE 5/5.  Limited assessment of RIGHT LE due to severe RIGHT hip pain  PSYCH: A+O to person, place, and time; affect appropriate  NEUROLOGY: CN 2-12 are intact and symmetric; no gross sensory deficits   SKIN: R lateral malleolus wound    LABS:    04-14    139  |  102  |  28<H>  ----------------------------<  99  4.7   |  28  |  0.79    Ca    8.9      14 Apr 2023 07:08  Phos  4.4     04-14  Mg     2.0     04-14                Culture - Joint Fluid (collected 13 Apr 2023 19:04)  Source: Hip Synovial Fluid  Gram Stain (14 Apr 2023 03:37):    No polymorphonuclear leukocytes seen    No organisms seen    by cytocentrifuge    Culture - Blood (collected 12 Apr 2023 11:02)  Source: .Blood Blood-Venous  Preliminary Report (13 Apr 2023 17:02):    No growth to date.    Culture - Blood (collected 11 Apr 2023 20:50)  Source: .Blood Blood-Peripheral  Preliminary Report (13 Apr 2023 01:02):    No growth to date.    Culture - Blood (collected 11 Apr 2023 20:20)  Source: .Blood Blood-Peripheral  Preliminary Report (13 Apr 2023 01:02):    No growth to date.      SARS-CoV-2: NotDetec (11 Apr 2023 21:08)      RADIOLOGY & ADDITIONAL TESTS:  New Results Reviewed Today:   New Imaging Personally Reviewed Today:  New Electrocardiogram Personally Reviewed Today:  Prior or Outpatient Records Reviewed Today:    COMMUNICATION:  Care Discussed with Consultants/Other Providers and Details of Discussion: Spoke to Podiatry regarding management. Will aim to have further studies done.   Discussions with Patient/Family:  PCP Communication:

## 2023-04-14 NOTE — PROGRESS NOTE ADULT - PROBLEM SELECTOR PLAN 2
KARLEE MODI. Has PICC line from [facility]? attempted to call daughter again today to gather collateral  info regarding admission at [facility], daughter did not . Will aim to call daughter tomorrow.   - Will continue with Vancomycin given suspicion of chronic OM   - TTE negative for vegetations

## 2023-04-14 NOTE — PROGRESS NOTE ADULT - SUBJECTIVE AND OBJECTIVE BOX
DATE OF SERVICE: 04-14-23 @ 12:20    Patient is a 77y old  Female who presents with a chief complaint of Sent in from [facility] for worsening RIGHT hip pain (14 Apr 2023 06:38)      INTERVAL HISTORY: Feels ok.     REVIEW OF SYSTEMS:  CONSTITUTIONAL: No weakness  EYES/ENT: No visual changes;  No throat pain   NECK: No pain or stiffness  RESPIRATORY: No cough, wheezing; No shortness of breath  CARDIOVASCULAR: No chest pain or palpitations  GASTROINTESTINAL: No abdominal  pain. No nausea, vomiting, or hematemesis  GENITOURINARY: No dysuria, frequency or hematuria  NEUROLOGICAL: No stroke like symptoms  SKIN: No rashes    TELEMETRY Personally reviewed: SR 60-70  	  MEDICATIONS:  furosemide   Injectable 20 milliGRAM(s) IV Push daily  hydrALAZINE 100 milliGRAM(s) Oral every 8 hours  losartan 100 milliGRAM(s) Oral daily  NIFEdipine XL 60 milliGRAM(s) Oral daily        PHYSICAL EXAM:  T(C): 36.3 (04-14-23 @ 06:05), Max: 36.7 (04-13-23 @ 12:22)  HR: 69 (04-14-23 @ 06:05) (64 - 78)  BP: 170/70 (04-14-23 @ 06:05) (143/68 - 170/70)  RR: 18 (04-14-23 @ 06:05) (18 - 18)  SpO2: 95% (04-14-23 @ 06:05) (92% - 95%)  Wt(kg): --  I&O's Summary    13 Apr 2023 07:01  -  14 Apr 2023 07:00  --------------------------------------------------------  IN: 480 mL / OUT: 2650 mL / NET: -2170 mL          Appearance: In no distress	  HEENT:    PERRL, EOMI	  Cardiovascular:  S1 S2, + JVD  Respiratory: Lungs clear to auscultation	  Gastrointestinal:  Soft, Non-tender, + BS	  Vascularature:  No edema of LE  Psychiatric: Appropriate affect   Neuro: no acute focal deficits           04-14    139  |  102  |  28<H>  ----------------------------<  99  4.7   |  28  |  0.79    Ca    8.9      14 Apr 2023 07:08  Phos  4.4     04-14  Mg     2.0     04-14          Labs personally reviewed      ASSESSMENT/PLAN: 	    76 y/o F--history of revision of a RIGHT USAMA in April 2019 with second revision of RIGHT hip Sept 2019, HTN, chronic anxiety disorder, recent hospitalization at [facility] 3 weeks ago for MRSA bacteremia with patient on IV vancomycin 750 mg Q12H with rifampin as a synergistic agent. Patient denies fever, chills, rigors.  No chest pain/pressure.  NO palpitations.   Patient denies dyspnoea but with poor exercise capacity.     1. MRSA bacteremia  - Blood Cx 4/11 NGTD  - Afebrile, no leukocytosis  - TRUDY if Cultures repeat + and if ID recommends  - TTE shows preserved EF, no evidence of vegetations    2. Chronic Diastolic Heart Failure  - CXR shows small left pleural effusion  - BNP 3206  - TTE shows preserved EF, no WMA, moderate AS  - c/w Lasix 20mg IVP daily, approaching euvolemia  - Likely transition to PO within the next 48-72 hours    3. Hypertension  - c/w losartan 100mg daily  - c/w hydralazine 100mg PO TID  - c/w Nifedipine 60mg PO daily    4. DVT ppx  - c/w SQ Heparin        Amarilys Vásquez, SERVANDO-NP   Domenico Christianson DO [facility]  Cardiovascular Medicine  [address] [place] [address], [address]  Available through call or text on [employer] TEAMs  Office: [phone]   DATE OF SERVICE: 04-14-23 @ 12:20    Patient is a 77y old  Female who presents with a chief complaint of Sent in from [facility] for worsening RIGHT hip pain (14 Apr 2023 06:38)      INTERVAL HISTORY: Feels ok.     REVIEW OF SYSTEMS:  CONSTITUTIONAL: No weakness  EYES/ENT: No visual changes;  No throat pain   NECK: No pain or stiffness  RESPIRATORY: No cough, wheezing; No shortness of breath  CARDIOVASCULAR: No chest pain or palpitations  GASTROINTESTINAL: No abdominal  pain. No nausea, vomiting, or hematemesis  GENITOURINARY: No dysuria, frequency or hematuria  NEUROLOGICAL: No stroke like symptoms  SKIN: No rashes    TELEMETRY Personally reviewed: SR 60-70  	  MEDICATIONS:  furosemide   Injectable 20 milliGRAM(s) IV Push daily  hydrALAZINE 100 milliGRAM(s) Oral every 8 hours  losartan 100 milliGRAM(s) Oral daily  NIFEdipine XL 60 milliGRAM(s) Oral daily        PHYSICAL EXAM:  T(C): 36.3 (04-14-23 @ 06:05), Max: 36.7 (04-13-23 @ 12:22)  HR: 69 (04-14-23 @ 06:05) (64 - 78)  BP: 170/70 (04-14-23 @ 06:05) (143/68 - 170/70)  RR: 18 (04-14-23 @ 06:05) (18 - 18)  SpO2: 95% (04-14-23 @ 06:05) (92% - 95%)  Wt(kg): --  I&O's Summary    13 Apr 2023 07:01  -  14 Apr 2023 07:00  --------------------------------------------------------  IN: 480 mL / OUT: 2650 mL / NET: -2170 mL          Appearance: In no distress	  HEENT:    PERRL, EOMI	  Cardiovascular:  S1 S2, + JVD  Respiratory: Lungs clear to auscultation	  Gastrointestinal:  Soft, Non-tender, + BS	  Vascularature:  No edema of LE  Psychiatric: Appropriate affect   Neuro: no acute focal deficits           04-14    139  |  102  |  28<H>  ----------------------------<  99  4.7   |  28  |  0.79    Ca    8.9      14 Apr 2023 07:08  Phos  4.4     04-14  Mg     2.0     04-14          Labs personally reviewed      ASSESSMENT/PLAN: 	    78 y/o F--history of revision of a RIGHT USAMA in April 2019 with second revision of RIGHT hip Sept 2019, HTN, chronic anxiety disorder, recent hospitalization at [facility] 3 weeks ago for MRSA bacteremia with patient on IV vancomycin 750 mg Q12H with rifampin as a synergistic agent. Patient denies fever, chills, rigors.  No chest pain/pressure.  NO palpitations.   Patient denies dyspnoea but with poor exercise capacity.     1. MRSA bacteremia  - Blood Cx 4/11 NGTD  - Afebrile, no leukocytosis  - TRUDY if Cultures repeat + and if ID recommends  - TTE shows preserved EF, no evidence of vegetations    2. Chronic Diastolic Heart Failure  - CXR shows small left pleural effusion  - BNP 3206  - TTE shows preserved EF, no WMA, moderate AS  - c/w Lasix 20mg IVP daily, approaching euvolemia  - Likely transition to PO within the next 48-72 hours    3. Hypertension  - c/w losartan 100mg daily  - c/w hydralazine 100mg PO TID  - c/w Nifedipine 60mg PO daily    4. DVT ppx  - c/w SQ Heparin        Amarilys Vásquez, SERVANDO-NP   Domenico Christianson DO [facility]  Cardiovascular Medicine  [address] [place] [address], [address]  Available through call or text on [employer] TEAMs  Office: [phone]

## 2023-04-14 NOTE — PROGRESS NOTE ADULT - SUBJECTIVE AND OBJECTIVE BOX
Overnight events: None    SUBJECTIVE: Pt seen and examined at bedside. Patient was uncomfortable after IR aspiration yesterday.       OBJECTIVE:  Vital Signs Last 24 Hrs  T(C): 36.3 (14 Apr 2023 06:05), Max: 36.7 (13 Apr 2023 12:22)  T(F): 97.4 (14 Apr 2023 06:05), Max: 98.1 (13 Apr 2023 12:22)  HR: 69 (14 Apr 2023 06:05) (64 - 78)  BP: 170/70 (14 Apr 2023 06:05) (143/68 - 170/70)  BP(mean): --  RR: 18 (14 Apr 2023 06:05) (18 - 18)  SpO2: 95% (14 Apr 2023 06:05) (92% - 95%)    Parameters below as of 14 Apr 2023 06:05  Patient On (Oxygen Delivery Method): nasal cannula  O2 Flow (L/min): 2        04-12-23 @ 07:01  -  04-13-23 @ 07:00  --------------------------------------------------------  IN: 240 mL / OUT: 800 mL / NET: -560 mL    04-13-23 @ 07:01  -  04-14-23 @ 06:39  --------------------------------------------------------  IN: 480 mL / OUT: 1650 mL / NET: -1170 mL        Physical Examination:  Gen: Lying in bed, NAD  Resp: no increased WOB  RLE:  Skin intact, improvement of erythema over R hip  +TTP over R hip, warmer to touch relative to L side; compartments soft  R hip ROM limited 2/2 pain  Motor: TA/EHL/GS/FHL intact  Sensory: DP/SP/Tib/Adryan/Saph SILT  +DP pulse, WWP      LABS:                        10.1   6.45  )-----------( 222      ( 12 Apr 2023 11:01 )             32.7       04-13    140  |  100  |  24<H>  ----------------------------<  83  4.7   |  32<H>  |  0.78    Ca    9.0      13 Apr 2023 07:34  Phos  4.4     04-13  Mg     2.2     04-13

## 2023-04-14 NOTE — PROGRESS NOTE ADULT - PROBLEM SELECTOR PLAN 1
Plan for diverting ileostomy vs colostomy depending on intra op findings.    Discussed and decided at recent [facility].    Sergey Holder MD    I reviewed the H&P, I examined the patient, and there are no changes in the patient's condition.     s/p THR (c/b multiple infections and revisions) w/ R hip pain for 1 month. Afebrile, no leukocytosis ESR 70 CRP 4.2. 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 MRSA infection of the hip last time which lead to surgery.    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 Changes may be related to loosening or infection in the appropriate setting.    - IR aspiration of R hip yesterday, pending fluid analysis   - C/w Vancomycin, renally dose. WIll hold Vanc today d/t elevated Vanc level and reschedule fro tomorrow   - Pain management   - F/U on o ANDREA/PVR  - BCLX NGTD   - WBAT, PT/OT as tolerated   - Ortho following, no surgical intervention at this time   - Podiatry following, no acute intervention from podiatry  - ID following   - ID and Podiatry following s/p THR (c/b multiple infections and revisions) w/ R hip pain for 1 month. Afebrile, no leukocytosis ESR 70 CRP 4.2. 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 MRSA infection of the hip last time which lead to surgery.    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 Changes may be related to loosening or infection in the appropriate setting.    - IR aspiration of R hip yesterday, synovial fluid analysis negative. Cell count, more neutrophilic predominant.   - C/w Vancomycin, renally dose. WIll hold Vanc today d/t elevated Vanc level and reschedule fro tomorrow   - Pain management   - F/U on o ANDREA/PVR  - BCLX NGTD   - WBAT, PT/OT as tolerated   - Ortho following, no surgical intervention at this time   - Podiatry following, no acute intervention from podiatry  - ID following   - ID and Podiatry following

## 2023-04-14 NOTE — PROGRESS NOTE ADULT - ASSESSMENT
77yFemale s/p THR (c/b multiple infections and revisions) w/ R hip pain for 1 month. ESR pending/CRP 42. Unknown medical workup completed at [facility]. Due to the complex nature of previous hospital courses and current COPD exacerbation, no acute surgical orthopedic intervention is needed at this time. Any subsequent intervention would require extensive pre-operative planning and counseling with patient    - Continue abx  - WBAT RLE  - f/u ESR   - obtain medical records from [facility]  - no acute ortho surgery at this time  - pain control  - ice/cold compress  - will follow closely    For all questions related to patient care, please reach out to the on-call team via the pager.     Orthopaedic Surgery  [facility] [id number]  [facility] [id number]  [facility] [phone]

## 2023-04-14 NOTE — PROGRESS NOTE ADULT - SUBJECTIVE AND OBJECTIVE BOX
77yPatient is a 77y old  Female who presents with a chief complaint of Sent in from [facility] for worsening RIGHT hip pain (14 Apr 2023 12:23)      Interval history:  Afebrile, continues to complain of pain in the hip.       Allergies:   No Known Allergies      Antimicrobials:      REVIEW OF SYSTEMS:  No chest pain   No abdominal pain  No rash.       Vital Signs Last 24 Hrs  T(C): 36.3 (04-14-23 @ 06:05), Max: 36.6 (04-13-23 @ 20:30)  T(F): 97.4 (04-14-23 @ 06:05), Max: 97.8 (04-13-23 @ 20:30)  HR: 69 (04-14-23 @ 06:05) (69 - 78)  BP: 170/70 (04-14-23 @ 06:05) (143/68 - 170/70)  BP(mean): --  RR: 18 (04-14-23 @ 06:05) (18 - 18)  SpO2: 95% (04-14-23 @ 06:05) (92% - 95%)      PHYSICAL EXAM:  Pt in no acute distress, alert, awake. sitting in chair.   breathing comfortably on NC.   non distended abdomen  Rt LE edema with chronic skin changes and erythema.   no phlebitis       04-14    139  |  102  |  28<H>  ----------------------------<  99  4.7   |  28  |  0.79    Ca    8.9      14 Apr 2023 07:08  Phos  4.4     04-14  Mg     2.0     04-14      Glucose, Fluid (04.13.23 @ 19:05)   Glucose, Fluid: <6:      Culture - Joint Fluid (collected 13 Apr 2023 19:04)  Source: Hip Synovial Fluid  Gram Stain (14 Apr 2023 03:37):    No polymorphonuclear leukocytes seen    No organisms seen    by cytocentrifuge    Culture - Blood (collected 12 Apr 2023 11:02)  Source: .Blood Blood-Venous  Preliminary Report (13 Apr 2023 17:02):    No growth to date.    Culture - Blood (collected 11 Apr 2023 20:50)  Source: .Blood Blood-Peripheral  Preliminary Report (13 Apr 2023 01:02):    No growth to date.    Culture - Blood (collected 11 Apr 2023 20:20)  Source: .Blood Blood-Peripheral  Preliminary Report (13 Apr 2023 01:02):    No growth to date.        Radiology:    < from: [facility] Physiol Extremity Lower 3+ Level, BI (04.14.23 @ 12:19) >  IMPRESSION: Moderate bilateral lower extremity arterial flow limitation   localizing to the popliteal and infrapopliteal levels.

## 2023-04-14 NOTE — PROGRESS NOTE ADULT - ASSESSMENT
78 y/o F--history of revision of a RIGHT USAMA in April 2019 with second revision of RIGHT hip Sept 2019, essential HTN, chronic anxiety disorder with a recent hospitalization at [facility] 3 weeks ago in the setting of apparent MRSA bacteremia with patient on IV vancomycin 750 mg Q12H with rifampin as a synergistic agent. Patient with severe pain despite oral Rx at [facility] and was sent to the ER.      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  MRSA infection of the hip last time which lead to surgery.       PLAN:   C/w vanco, level elevated, dose adjusted based on AUC/DEVAN calculation   monitor vancomycin trough and creatinine to avoid nephrotoxicity and ensure efficacy   no need for rifampin, especially if the concern is hip as a source, pt with chronic OM, the treatment for chronic OM is surgical debridement and not abx.   Risk of interaction with other meds with rifampin very high.   follow up repeat blood cx, ntd  Ortho on board  s/p IR guided aspiration of the rt hip joint. cell count not compatible with infection but pt on abx already and glucose very low suggestive of infection.   synovial fluid cx NTD.  Ankle ulcer, s/p podiatry eval.   Need records form [facility].  TTE with no vegetations.       Plan discussed with Pharmacy     Aurelio Montoya  Please contact through MS Teams   If no response or past 5 pm/weekend call [phone].

## 2023-04-14 NOTE — PHARMACOTHERAPY INTERVENTION NOTE - COMMENTS
DAQUAN MARTINEZ, 77y Female, currently on vancomycin 750mg Q12H for MRSA Bacteremia and hip OM. Patient being followed by ID. SCr: 0.79, stable. Pre-4th vanco level resulted back at 29.9. Recommend to dose adjust vancomycin to 750mg Q24 for an estimated  and trough of 12.7 as predicted by vanco PK calculator. Patient already got 750mg dose this morning so will re-enter order starting tomorrow 4/15. Discussed and approved with ID attending.    Nicole Leon, AmayaD, [facility]  Clinical Pharmacy Specialist  [phone] or Teams

## 2023-04-15 LAB
ANION GAP SERPL CALC-SCNC: 6 MMOL/L — SIGNIFICANT CHANGE UP (ref 5–17)
BUN SERPL-MCNC: 40 MG/DL — HIGH (ref 7–23)
CALCIUM SERPL-MCNC: 8.8 MG/DL — SIGNIFICANT CHANGE UP (ref 8.4–10.5)
CHLORIDE SERPL-SCNC: 100 MMOL/L — SIGNIFICANT CHANGE UP (ref 96–108)
CO2 SERPL-SCNC: 31 MMOL/L — SIGNIFICANT CHANGE UP (ref 22–31)
CREAT SERPL-MCNC: 0.93 MG/DL — SIGNIFICANT CHANGE UP (ref 0.5–1.3)
EGFR: 64 ML/MIN/1.73M2 — SIGNIFICANT CHANGE UP
GLUCOSE SERPL-MCNC: 100 MG/DL — HIGH (ref 70–99)
MAGNESIUM SERPL-MCNC: 2 MG/DL — SIGNIFICANT CHANGE UP (ref 1.6–2.6)
PHOSPHATE SERPL-MCNC: 5 MG/DL — HIGH (ref 2.5–4.5)
POTASSIUM SERPL-MCNC: 4.8 MMOL/L — SIGNIFICANT CHANGE UP (ref 3.5–5.3)
POTASSIUM SERPL-SCNC: 4.8 MMOL/L — SIGNIFICANT CHANGE UP (ref 3.5–5.3)
SODIUM SERPL-SCNC: 137 MMOL/L — SIGNIFICANT CHANGE UP (ref 135–145)

## 2023-04-15 PROCEDURE — 99222 1ST HOSP IP/OBS MODERATE 55: CPT

## 2023-04-15 PROCEDURE — 99233 SBSQ HOSP IP/OBS HIGH 50: CPT

## 2023-04-15 RX ORDER — ATORVASTATIN CALCIUM 80 MG/1
40 TABLET, FILM COATED ORAL AT BEDTIME
Refills: 0 | Status: DISCONTINUED | OUTPATIENT
Start: 2023-04-15 | End: 2023-04-19

## 2023-04-15 RX ORDER — OXYCODONE AND ACETAMINOPHEN 5; 325 MG/1; MG/1
1 TABLET ORAL EVERY 6 HOURS
Refills: 0 | Status: DISCONTINUED | OUTPATIENT
Start: 2023-04-15 | End: 2023-04-18

## 2023-04-15 RX ORDER — ASPIRIN/CALCIUM CARB/MAGNESIUM 324 MG
81 TABLET ORAL DAILY
Refills: 0 | Status: DISCONTINUED | OUTPATIENT
Start: 2023-04-15 | End: 2023-04-19

## 2023-04-15 RX ORDER — HYDROMORPHONE HYDROCHLORIDE 2 MG/ML
2 INJECTION INTRAMUSCULAR; INTRAVENOUS; SUBCUTANEOUS EVERY 8 HOURS
Refills: 0 | Status: DISCONTINUED | OUTPATIENT
Start: 2023-04-15 | End: 2023-04-18

## 2023-04-15 RX ORDER — OXYCODONE AND ACETAMINOPHEN 5; 325 MG/1; MG/1
1 TABLET ORAL ONCE
Refills: 0 | Status: DISCONTINUED | OUTPATIENT
Start: 2023-04-15 | End: 2023-04-15

## 2023-04-15 RX ADMIN — OXYCODONE AND ACETAMINOPHEN 1 TABLET(S): 5; 325 TABLET ORAL at 12:44

## 2023-04-15 RX ADMIN — Medication 1 APPLICATION(S): at 12:46

## 2023-04-15 RX ADMIN — Medication 100 MILLIGRAM(S): at 14:27

## 2023-04-15 RX ADMIN — Medication 0.5 MILLIGRAM(S): at 05:51

## 2023-04-15 RX ADMIN — OXYCODONE AND ACETAMINOPHEN 1 TABLET(S): 5; 325 TABLET ORAL at 05:53

## 2023-04-15 RX ADMIN — HEPARIN SODIUM 5000 UNIT(S): 5000 INJECTION INTRAVENOUS; SUBCUTANEOUS at 05:51

## 2023-04-15 RX ADMIN — OXYCODONE AND ACETAMINOPHEN 1 TABLET(S): 5; 325 TABLET ORAL at 07:08

## 2023-04-15 RX ADMIN — OXYCODONE AND ACETAMINOPHEN 1 TABLET(S): 5; 325 TABLET ORAL at 09:18

## 2023-04-15 RX ADMIN — Medication 0.5 MILLIGRAM(S): at 12:45

## 2023-04-15 RX ADMIN — GABAPENTIN 400 MILLIGRAM(S): 400 CAPSULE ORAL at 05:51

## 2023-04-15 RX ADMIN — MIRTAZAPINE 7.5 MILLIGRAM(S): 45 TABLET, ORALLY DISINTEGRATING ORAL at 22:51

## 2023-04-15 RX ADMIN — OXYCODONE AND ACETAMINOPHEN 1 TABLET(S): 5; 325 TABLET ORAL at 13:13

## 2023-04-15 RX ADMIN — LOSARTAN POTASSIUM 100 MILLIGRAM(S): 100 TABLET, FILM COATED ORAL at 05:51

## 2023-04-15 RX ADMIN — OXYCODONE AND ACETAMINOPHEN 1 TABLET(S): 5; 325 TABLET ORAL at 11:32

## 2023-04-15 RX ADMIN — HYDROMORPHONE HYDROCHLORIDE 2 MILLIGRAM(S): 2 INJECTION INTRAMUSCULAR; INTRAVENOUS; SUBCUTANEOUS at 23:35

## 2023-04-15 RX ADMIN — Medication 0.5 MILLIGRAM(S): at 18:48

## 2023-04-15 RX ADMIN — Medication 3 MILLILITER(S): at 05:51

## 2023-04-15 RX ADMIN — Medication 100 MILLIGRAM(S): at 05:51

## 2023-04-15 RX ADMIN — Medication 250 MILLIGRAM(S): at 05:52

## 2023-04-15 RX ADMIN — Medication 20 MILLIGRAM(S): at 05:52

## 2023-04-15 RX ADMIN — Medication 81 MILLIGRAM(S): at 12:44

## 2023-04-15 RX ADMIN — HYDROMORPHONE HYDROCHLORIDE 2 MILLIGRAM(S): 2 INJECTION INTRAMUSCULAR; INTRAVENOUS; SUBCUTANEOUS at 22:51

## 2023-04-15 RX ADMIN — OXYCODONE AND ACETAMINOPHEN 1 TABLET(S): 5; 325 TABLET ORAL at 19:40

## 2023-04-15 RX ADMIN — OXYCODONE AND ACETAMINOPHEN 1 TABLET(S): 5; 325 TABLET ORAL at 18:48

## 2023-04-15 RX ADMIN — CHLORHEXIDINE GLUCONATE 1 APPLICATION(S): 213 SOLUTION TOPICAL at 10:50

## 2023-04-15 RX ADMIN — ATORVASTATIN CALCIUM 40 MILLIGRAM(S): 80 TABLET, FILM COATED ORAL at 22:50

## 2023-04-15 RX ADMIN — HYDROMORPHONE HYDROCHLORIDE 2 MILLIGRAM(S): 2 INJECTION INTRAMUSCULAR; INTRAVENOUS; SUBCUTANEOUS at 15:41

## 2023-04-15 RX ADMIN — Medication 100 MILLIGRAM(S): at 22:51

## 2023-04-15 RX ADMIN — Medication 60 MILLIGRAM(S): at 05:52

## 2023-04-15 RX ADMIN — ZOLPIDEM TARTRATE 5 MILLIGRAM(S): 10 TABLET ORAL at 22:51

## 2023-04-15 RX ADMIN — HYDROMORPHONE HYDROCHLORIDE 2 MILLIGRAM(S): 2 INJECTION INTRAMUSCULAR; INTRAVENOUS; SUBCUTANEOUS at 16:28

## 2023-04-15 RX ADMIN — Medication 5 MILLIGRAM(S): at 22:51

## 2023-04-15 RX ADMIN — GABAPENTIN 400 MILLIGRAM(S): 400 CAPSULE ORAL at 22:51

## 2023-04-15 NOTE — PROGRESS NOTE ADULT - SUBJECTIVE AND OBJECTIVE BOX
ORTHO PROGRESS NOTE     Pt seen and examined at bedside, denies SOB, CP, Dizziness. N/V/D /HA.  No significant overnight events. Pain well controlled.    Vital Signs Last 24 Hrs  T(C): 36.7 (15 Apr 2023 06:21), Max: 36.7 (15 Apr 2023 00:53)  T(F): 98.1 (15 Apr 2023 06:21), Max: 98.1 (15 Apr 2023 06:21)  HR: 82 (15 Apr 2023 06:21) (77 - 82)  BP: 132/72 (15 Apr 2023 06:21) (132/72 - 138/75)  BP(mean): --  RR: 20 (15 Apr 2023 06:21) (18 - 20)  SpO2: 95% (15 Apr 2023 06:21) (93% - 95%)    Parameters below as of 15 Apr 2023 06:21  Patient On (Oxygen Delivery Method): nasal cannula  O2 Flow (L/min): 2      Gen: NAD, alert and oriented  Resp: Unlabored breathing  RLE: Overlying erythema over hip       SILT DP/SP/ Adryan/Saph/tib       5/5 EHL 5/5 FHL 5/5 TA 5/5 Gastroc 5/5 IP        DP+,        soft compartments, no calf ttp,       Labs:      04-15    137  |  100  |  40<H>  ----------------------------<  100<H>  4.8   |  31  |  0.93    Ca    8.8      15 Apr 2023 07:18  Phos  5.0     04-15  Mg     2.0     04-15        77yFemale s/p THR (c/b multiple infections and revisions) w/ R hip pain for 1 month. ESR pending/CRP 42. Unknown medical workup completed at [facility]. Due to the complex nature of previous hospital courses and current COPD exacerbation, no acute surgical orthopedic intervention is needed at this time. Any subsequent intervention would require extensive pre-operative planning and counseling with patient. Aspirated by IR on 4/13    - FU results IR asp  - Continue abx  - WBAT RLE  - f/u ESR   - obtain medical records from [facility]  - no acute ortho surgery at this time  - pain control  - ice/cold compress  - will follow closely

## 2023-04-15 NOTE — PROGRESS NOTE ADULT - PROBLEM SELECTOR PLAN 1
s/p THR (c/b multiple infections and revisions) w/ R hip pain for 1 month. Afebrile, no leukocytosis ESR 70 CRP 4.2. 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 MRSA infection of the hip last time which lead to surgery.    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 Changes may be related to loosening or infection in the appropriate setting.    - IR aspiration of R hip yesterday, synovial fluid analysis negative. Cell count, more neutrophilic predominant.   - C/w Vancomycin, renally dose. F/u with Vanc level in am   - Pain management   - ANDREA/PVR:  Moderate bilateral lower extremity arterial flow limitation localizing to the popliteal and infrapopliteal levels, will start ASA and Atorvastatin. Vascular consulted   - BCLX NGTD   - WBAT, PT/OT as tolerated   - Ortho following, no surgical intervention at this time   - Podiatry following, no acute intervention from podiatry  - ID following   - ID and Podiatry following s/p THR (c/b multiple infections and revisions) w/ R hip pain for 1 month. Afebrile, no leukocytosis ESR 70 CRP 4.2. 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 MRSA infection of the hip last time which lead to surgery.    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 Changes may be related to loosening or infection in the appropriate setting.    -Spoke to ID fellow over the weekend, will likely need 4-6 weeks of IV abx, will keep current PICC line, but patient might need new PICC. Will wait final ID recs.   - IR aspiration of R hip: synovial fluid analysis negative. Cell count, more neutrophilic predominant.   - C/w Vancomycin, renally dose. F/u with Vanc level in am   - Pain management: Percocet PRN for moderate pain and added Dilaudid PRN for severe pain  - ANDREA/PVR:  Moderate bilateral lower extremity arterial flow limitation localizing to the popliteal and infrapopliteal levels, will start ASA and Atorvastatin. Vascular consulted   - BCLX NGTD   - WBAT, PT/OT as tolerated   - Ortho following, no surgical intervention at this time   - Podiatry following, no acute intervention from podiatry  - ID following   - ID and Podiatry following

## 2023-04-15 NOTE — PROGRESS NOTE ADULT - PROBLEM SELECTOR PLAN 2
BCLX NGTD. Has PICC line from [facility]? attempted to call daughter again today to gather collateral  info regarding admission at [facility], daughter did not . Will aim to call daughter tomorrow.   - C/w Vancomycin given suspicion of chronic OM   - TTE negative for vegetations

## 2023-04-15 NOTE — PROGRESS NOTE ADULT - SUBJECTIVE AND OBJECTIVE BOX
[facility] Division of Hospital Medicine  Jie Lozano MD  Available via MS Teams  Pager: [phone]    SUBJECTIVE / OVERNIGHT EVENTS: Patient seen and examined at bedside. No acute overnight events. Pt denies dyspnea, chest pain fevers, chills, cough, HA, dizziness, syncope, chest palpitations, abd pain, n/v/d, urinary or bowel changes, active sites of bleeding or any other symptoms at this time.    ADDITIONAL REVIEW OF SYSTEMS: negative     MEDICATIONS  (STANDING):  albuterol/ipratropium for Nebulization 3 milliLiter(s) Nebulizer every 6 hours  ascorbic acid 500 milliGRAM(s) Oral daily  chlorhexidine 2% Cloths 1 Application(s) Topical daily  clonazePAM  Tablet 0.5 milliGRAM(s) Oral two times a day  collagenase Ointment 1 Application(s) Topical daily  furosemide   Injectable 20 milliGRAM(s) IV Push daily  gabapentin 400 milliGRAM(s) Oral three times a day  heparin   Injectable 5000 Unit(s) SubCutaneous every 12 hours  hydrALAZINE 100 milliGRAM(s) Oral every 8 hours  lidocaine   4% Patch 1 Patch Transdermal daily  losartan 100 milliGRAM(s) Oral daily  melatonin 5 milliGRAM(s) Oral at bedtime  mirtazapine 7.5 milliGRAM(s) Oral at bedtime  multivitamin 1 Tablet(s) Oral daily  NIFEdipine XL 60 milliGRAM(s) Oral daily  oxycodone    5 mG/acetaminophen 325 mG 1 Tablet(s) Oral once  vancomycin  IVPB 750 milliGRAM(s) IV Intermittent every 24 hours    MEDICATIONS  (PRN):  acetaminophen     Tablet .. 650 milliGRAM(s) Oral every 6 hours PRN Temp greater or equal to 38C (100.4F), Mild Pain (1 - 3)  clonazePAM  Tablet 0.5 milliGRAM(s) Oral two times a day PRN for anxiety  oxycodone    5 mG/acetaminophen 325 mG 1 Tablet(s) Oral every 6 hours PRN Severe Pain (7 - 10)  zolpidem 5 milliGRAM(s) Oral at bedtime PRN Insomnia      I&O's Summary    14 Apr 2023 07:01  -  15 Apr 2023 07:00  --------------------------------------------------------  IN: 720 mL / OUT: 2300 mL / NET: -1580 mL        PHYSICAL EXAM:  Vital Signs Last 24 Hrs  T(C): 36.7 (15 Apr 2023 08:06), Max: 36.7 (15 Apr 2023 00:53)  T(F): 98.1 (15 Apr 2023 08:06), Max: 98.1 (15 Apr 2023 06:21)  HR: 81 (15 Apr 2023 08:06) (77 - 82)  BP: 119/60 (15 Apr 2023 08:06) (119/60 - 138/75)  BP(mean): --  RR: 18 (15 Apr 2023 08:06) (18 - 20)  SpO2: 95% (15 Apr 2023 08:06) (93% - 95%)    Parameters below as of 15 Apr 2023 08:06  Patient On (Oxygen Delivery Method): nasal cannula  O2 Flow (L/min): 4    CONSTITUTIONAL: NAD, well-developed, well-groomed  EYES: PERRLA; conjunctiva and sclera clear  ENMT: Moist oral mucosa, no pharyngeal injection or exudates; normal dentition  NECK: Supple, no palpable masses; no thyromegaly  RESPIRATORY: Normal respiratory effort; lungs are clear to auscultation bilaterally  CARDIOVASCULAR: Regular rate and rhythm, normal S1 and S2, no murmur/rub/gallop; No lower extremity edema; Peripheral pulses are 2+ bilaterally  ABDOMEN: Nontender to palpation, normoactive bowel sounds, no rebound/guarding; No hepatosplenomegaly  MUSCULOSKELETAL:   UE/LE 5/5.  Limited assessment of RIGHT LE due to severe RIGHT hip pain  PSYCH: A+O to person, place, and time; affect appropriate  NEUROLOGY: CN 2-12 are intact and symmetric; no gross sensory deficits   SKIN: R lateral malleolus wound    LABS:    04-15    137  |  100  |  40<H>  ----------------------------<  100<H>  4.8   |  31  |  0.93    Ca    8.8      15 Apr 2023 07:18  Phos  5.0     04-15  Mg     2.0     04-15      Culture - Joint Fluid (collected 13 Apr 2023 19:04)  Source: Hip Synovial Fluid  Gram Stain (14 Apr 2023 03:37):    No polymorphonuclear leukocytes seen    No organisms seen    by cytocentrifuge  Preliminary Report (15 Apr 2023 09:00):    No growth to date.    Culture - Blood (collected 12 Apr 2023 11:02)  Source: .Blood Blood-Venous  Preliminary Report (13 Apr 2023 17:02):    No growth to date.      SARS-CoV-2: NotDetec (11 Apr 2023 21:08)      RADIOLOGY & ADDITIONAL TESTS:  New Results Reviewed Today:   < from: [facility] Physiol Extremity Lower 3+ Level, BI (04.14.23 @ 12:19) >    IMPRESSION: Moderate bilateral lower extremity arterial flow limitation   localizing to the popliteal and infrapopliteal levels.    < end of copied text >    New Imaging Personally Reviewed Today:  New Electrocardiogram Personally Reviewed Today:  Prior or Outpatient Records Reviewed Today:    COMMUNICATION:  Care Discussed with Consultants/Other Providers and Details of Discussion:  Discussions with Patient/Family:  PCP Communication:     [facility] Division of Hospital Medicine  Jie Lozano MD  Available via MS Teams  Pager: [phone]    SUBJECTIVE / OVERNIGHT EVENTS: Patient seen and examined at bedside. No acute overnight events. Pt denies dyspnea, chest pain fevers, chills, cough, HA, dizziness, syncope, chest palpitations, abd pain, n/v/d, urinary or bowel changes, active sites of bleeding or any other symptoms at this time.    ADDITIONAL REVIEW OF SYSTEMS: negative     MEDICATIONS  (STANDING):  albuterol/ipratropium for Nebulization 3 milliLiter(s) Nebulizer every 6 hours  ascorbic acid 500 milliGRAM(s) Oral daily  chlorhexidine 2% Cloths 1 Application(s) Topical daily  clonazePAM  Tablet 0.5 milliGRAM(s) Oral two times a day  collagenase Ointment 1 Application(s) Topical daily  furosemide   Injectable 20 milliGRAM(s) IV Push daily  gabapentin 400 milliGRAM(s) Oral three times a day  heparin   Injectable 5000 Unit(s) SubCutaneous every 12 hours  hydrALAZINE 100 milliGRAM(s) Oral every 8 hours  lidocaine   4% Patch 1 Patch Transdermal daily  losartan 100 milliGRAM(s) Oral daily  melatonin 5 milliGRAM(s) Oral at bedtime  mirtazapine 7.5 milliGRAM(s) Oral at bedtime  multivitamin 1 Tablet(s) Oral daily  NIFEdipine XL 60 milliGRAM(s) Oral daily  oxycodone    5 mG/acetaminophen 325 mG 1 Tablet(s) Oral once  vancomycin  IVPB 750 milliGRAM(s) IV Intermittent every 24 hours    MEDICATIONS  (PRN):  acetaminophen     Tablet .. 650 milliGRAM(s) Oral every 6 hours PRN Temp greater or equal to 38C (100.4F), Mild Pain (1 - 3)  clonazePAM  Tablet 0.5 milliGRAM(s) Oral two times a day PRN for anxiety  oxycodone    5 mG/acetaminophen 325 mG 1 Tablet(s) Oral every 6 hours PRN Severe Pain (7 - 10)  zolpidem 5 milliGRAM(s) Oral at bedtime PRN Insomnia      I&O's Summary    14 Apr 2023 07:01  -  15 Apr 2023 07:00  --------------------------------------------------------  IN: 720 mL / OUT: 2300 mL / NET: -1580 mL        PHYSICAL EXAM:  Vital Signs Last 24 Hrs  T(C): 36.7 (15 Apr 2023 08:06), Max: 36.7 (15 Apr 2023 00:53)  T(F): 98.1 (15 Apr 2023 08:06), Max: 98.1 (15 Apr 2023 06:21)  HR: 81 (15 Apr 2023 08:06) (77 - 82)  BP: 119/60 (15 Apr 2023 08:06) (119/60 - 138/75)  BP(mean): --  RR: 18 (15 Apr 2023 08:06) (18 - 20)  SpO2: 95% (15 Apr 2023 08:06) (93% - 95%)    Parameters below as of 15 Apr 2023 08:06  Patient On (Oxygen Delivery Method): nasal cannula  O2 Flow (L/min): 4    CONSTITUTIONAL: NAD, well-developed, well-groomed  EYES: PERRLA; conjunctiva and sclera clear  ENMT: Moist oral mucosa, no pharyngeal injection or exudates; normal dentition  NECK: Supple, no palpable masses; no thyromegaly  RESPIRATORY: Normal respiratory effort; lungs are clear to auscultation bilaterally  CARDIOVASCULAR: Regular rate and rhythm, normal S1 and S2, no murmur/rub/gallop; No lower extremity edema; Peripheral pulses are 2+ bilaterally  ABDOMEN: Nontender to palpation, normoactive bowel sounds, no rebound/guarding; No hepatosplenomegaly  MUSCULOSKELETAL:   UE/LE 5/5.  Limited assessment of RIGHT LE due to severe RIGHT hip pain  PSYCH: A+O to person, place, and time; affect appropriate  NEUROLOGY: CN 2-12 are intact and symmetric; no gross sensory deficits   SKIN: R lateral malleolus wound    LABS:    04-15    137  |  100  |  40<H>  ----------------------------<  100<H>  4.8   |  31  |  0.93    Ca    8.8      15 Apr 2023 07:18  Phos  5.0     04-15  Mg     2.0     04-15      Culture - Joint Fluid (collected 13 Apr 2023 19:04)  Source: Hip Synovial Fluid  Gram Stain (14 Apr 2023 03:37):    No polymorphonuclear leukocytes seen    No organisms seen    by cytocentrifuge  Preliminary Report (15 Apr 2023 09:00):    No growth to date.    Culture - Blood (collected 12 Apr 2023 11:02)  Source: .Blood Blood-Venous  Preliminary Report (13 Apr 2023 17:02):    No growth to date.      SARS-CoV-2: NotDetec (11 Apr 2023 21:08)      RADIOLOGY & ADDITIONAL TESTS:  New Results Reviewed Today:   < from: [facility] Physiol Extremity Lower 3+ Level, BI (04.14.23 @ 12:19) >    IMPRESSION: Moderate bilateral lower extremity arterial flow limitation   localizing to the popliteal and infrapopliteal levels.    < end of copied text >    New Imaging Personally Reviewed Today:  New Electrocardiogram Personally Reviewed Today:  Prior or Outpatient Records Reviewed Today:    COMMUNICATION:  Care Discussed with Consultants/Other Providers and Details of Discussion: Spoke to ID fellow over the weekend, will likely need 4-6 weeks of IV abx, will keep current PICC line, but patient might need new PICC. Will wait final ID recs.   Discussions with Patient/Family: Updated daughter on management   PCP Communication:

## 2023-04-15 NOTE — PROGRESS NOTE ADULT - NSPROGADDITIONALINFOA_GEN_ALL_CORE
Dispo: [facility] when stable, will aim for DC next week after confirmed ID recs regarding Abx   Diet: DASH  Activity: bedrest, fall precaution   VTE PPx: HSQ

## 2023-04-16 PROCEDURE — 99232 SBSQ HOSP IP/OBS MODERATE 35: CPT

## 2023-04-16 RX ADMIN — Medication 250 MILLIGRAM(S): at 06:05

## 2023-04-16 RX ADMIN — Medication 0.5 MILLIGRAM(S): at 18:19

## 2023-04-16 RX ADMIN — Medication 0.5 MILLIGRAM(S): at 20:54

## 2023-04-16 RX ADMIN — HYDROMORPHONE HYDROCHLORIDE 2 MILLIGRAM(S): 2 INJECTION INTRAMUSCULAR; INTRAVENOUS; SUBCUTANEOUS at 08:49

## 2023-04-16 RX ADMIN — HYDROMORPHONE HYDROCHLORIDE 2 MILLIGRAM(S): 2 INJECTION INTRAMUSCULAR; INTRAVENOUS; SUBCUTANEOUS at 15:25

## 2023-04-16 RX ADMIN — OXYCODONE AND ACETAMINOPHEN 1 TABLET(S): 5; 325 TABLET ORAL at 00:56

## 2023-04-16 RX ADMIN — Medication 100 MILLIGRAM(S): at 14:24

## 2023-04-16 RX ADMIN — Medication 60 MILLIGRAM(S): at 06:08

## 2023-04-16 RX ADMIN — OXYCODONE AND ACETAMINOPHEN 1 TABLET(S): 5; 325 TABLET ORAL at 14:18

## 2023-04-16 RX ADMIN — ATORVASTATIN CALCIUM 40 MILLIGRAM(S): 80 TABLET, FILM COATED ORAL at 22:45

## 2023-04-16 RX ADMIN — Medication 20 MILLIGRAM(S): at 06:07

## 2023-04-16 RX ADMIN — Medication 100 MILLIGRAM(S): at 22:45

## 2023-04-16 RX ADMIN — OXYCODONE AND ACETAMINOPHEN 1 TABLET(S): 5; 325 TABLET ORAL at 01:26

## 2023-04-16 RX ADMIN — Medication 3 MILLILITER(S): at 06:07

## 2023-04-16 RX ADMIN — OXYCODONE AND ACETAMINOPHEN 1 TABLET(S): 5; 325 TABLET ORAL at 07:01

## 2023-04-16 RX ADMIN — HYDROMORPHONE HYDROCHLORIDE 2 MILLIGRAM(S): 2 INJECTION INTRAMUSCULAR; INTRAVENOUS; SUBCUTANEOUS at 07:01

## 2023-04-16 RX ADMIN — GABAPENTIN 400 MILLIGRAM(S): 400 CAPSULE ORAL at 06:08

## 2023-04-16 RX ADMIN — GABAPENTIN 400 MILLIGRAM(S): 400 CAPSULE ORAL at 14:19

## 2023-04-16 RX ADMIN — GABAPENTIN 400 MILLIGRAM(S): 400 CAPSULE ORAL at 22:45

## 2023-04-16 RX ADMIN — CHLORHEXIDINE GLUCONATE 1 APPLICATION(S): 213 SOLUTION TOPICAL at 12:09

## 2023-04-16 RX ADMIN — Medication 1 APPLICATION(S): at 12:01

## 2023-04-16 RX ADMIN — HEPARIN SODIUM 5000 UNIT(S): 5000 INJECTION INTRAVENOUS; SUBCUTANEOUS at 06:07

## 2023-04-16 RX ADMIN — LOSARTAN POTASSIUM 100 MILLIGRAM(S): 100 TABLET, FILM COATED ORAL at 06:08

## 2023-04-16 RX ADMIN — HYDROMORPHONE HYDROCHLORIDE 2 MILLIGRAM(S): 2 INJECTION INTRAMUSCULAR; INTRAVENOUS; SUBCUTANEOUS at 00:15

## 2023-04-16 RX ADMIN — Medication 0.5 MILLIGRAM(S): at 06:08

## 2023-04-16 RX ADMIN — Medication 100 MILLIGRAM(S): at 06:07

## 2023-04-16 RX ADMIN — ZOLPIDEM TARTRATE 5 MILLIGRAM(S): 10 TABLET ORAL at 22:45

## 2023-04-16 RX ADMIN — Medication 5 MILLIGRAM(S): at 22:45

## 2023-04-16 RX ADMIN — OXYCODONE AND ACETAMINOPHEN 1 TABLET(S): 5; 325 TABLET ORAL at 15:18

## 2023-04-16 RX ADMIN — OXYCODONE AND ACETAMINOPHEN 1 TABLET(S): 5; 325 TABLET ORAL at 21:30

## 2023-04-16 RX ADMIN — OXYCODONE AND ACETAMINOPHEN 1 TABLET(S): 5; 325 TABLET ORAL at 08:50

## 2023-04-16 RX ADMIN — OXYCODONE AND ACETAMINOPHEN 1 TABLET(S): 5; 325 TABLET ORAL at 20:53

## 2023-04-16 RX ADMIN — MIRTAZAPINE 7.5 MILLIGRAM(S): 45 TABLET, ORALLY DISINTEGRATING ORAL at 22:45

## 2023-04-16 NOTE — PROGRESS NOTE ADULT - NSPROGADDITIONALINFOA_GEN_ALL_CORE
Dispo: [facility] when stable, will aim for DC next week after confirmed ID recs regarding Abx. Already has PICC line   Diet: DASH  Activity: bedrest, fall precaution   VTE PPx: HSQ

## 2023-04-16 NOTE — PROGRESS NOTE ADULT - SUBJECTIVE AND OBJECTIVE BOX
DATE OF SERVICE: 04-16-23 @ 12:04    Patient is a 77y old  Female who presents with a chief complaint of Sent in from [facility] for worsening RIGHT hip pain (15 Apr 2023 10:48)      INTERVAL HISTORY: no complaints     REVIEW OF SYSTEMS:  CONSTITUTIONAL: No weakness  EYES/ENT: No visual changes;  No throat pain   NECK: No pain or stiffness  RESPIRATORY: No cough, wheezing; No shortness of breath  CARDIOVASCULAR: No chest pain or palpitations  GASTROINTESTINAL: No abdominal  pain. No nausea, vomiting, or hematemesis  GENITOURINARY: No dysuria, frequency or hematuria  NEUROLOGICAL: No stroke like symptoms  SKIN: No rashes    	  MEDICATIONS:  furosemide   Injectable 20 milliGRAM(s) IV Push daily  hydrALAZINE 100 milliGRAM(s) Oral every 8 hours  losartan 100 milliGRAM(s) Oral daily  NIFEdipine XL 60 milliGRAM(s) Oral daily        PHYSICAL EXAM:  T(C): 36.9 (04-16-23 @ 11:30), Max: 36.9 (04-16-23 @ 11:30)  HR: 76 (04-16-23 @ 11:30) (70 - 82)  BP: 106/55 (04-16-23 @ 11:30) (106/55 - 144/67)  RR: 18 (04-16-23 @ 11:30) (18 - 18)  SpO2: 97% (04-16-23 @ 11:30) (95% - 97%)  Wt(kg): --  I&O's Summary    15 Apr 2023 07:01  -  16 Apr 2023 07:00  --------------------------------------------------------  IN: 240 mL / OUT: 2250 mL / NET: -2010 mL    16 Apr 2023 07:01  -  16 Apr 2023 12:04  --------------------------------------------------------  IN: 0 mL / OUT: 1300 mL / NET: -1300 mL          Appearance: In no distress	  HEENT:    PERRL, EOMI	  Cardiovascular:  S1 S2, No JVD  Respiratory: Lungs clear to auscultation	  Gastrointestinal:  Soft, Non-tender, + BS	  Vascularature:  No edema of LE  Psychiatric: Appropriate affect   Neuro: no acute focal deficits           04-15    137  |  100  |  40<H>  ----------------------------<  100<H>  4.8   |  31  |  0.93    Ca    8.8      15 Apr 2023 07:18  Phos  5.0     04-15  Mg     2.0     04-15          Labs personally reviewed      ASSESSMENT/PLAN: 	  78 y/o F--history of revision of a RIGHT USAMA in April 2019 with second revision of RIGHT hip Sept 2019, HTN, chronic anxiety disorder, recent hospitalization at [facility] 3 weeks ago for MRSA bacteremia with patient on IV vancomycin 750 mg Q12H with rifampin as a synergistic agent. Patient denies fever, chills, rigors.  No chest pain/pressure.  NO palpitations.   Patient denies dyspnoea but with poor exercise capacity.     1. MRSA bacteremia  - Blood Cx 4/11 NGTD  - Afebrile, no leukocytosis  - TRUDY if Cultures repeat + and if ID recommends  - TTE shows preserved EF, no evidence of vegetations    2. Chronic Diastolic Heart Failure  - CXR shows small left pleural effusion  - BNP 3206  - TTE shows preserved EF, no WMA, moderate AS  - c/w Lasix 20mg IVP daily, approaching euvolemia  - Likely transition to PO within the next 48-72 hours  - Can transition to PO Lasix today     3. Hypertension  - c/w losartan 100mg daily  - c/w hydralazine 100mg PO TID  - c/w Nifedipine 60mg PO daily    4. DVT ppx  - c/w SQ Heparin              KELLEY Trotter DO [facility]  Cardiovascular Medicine  [address], [address]  Office: [phone]  Available via call/text on Microsoft Teams

## 2023-04-16 NOTE — PROGRESS NOTE ADULT - PROBLEM SELECTOR PLAN 1
s/p THR (c/b multiple infections and revisions) w/ R hip pain for 1 month. Afebrile, no leukocytosis ESR 70 CRP 4.2. 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 MRSA infection of the hip last time which lead to surgery.    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 Changes may be related to loosening or infection in the appropriate setting.    - Spoke to ID fellow over the weekend, will likely need 4-6 weeks of IV abx, will keep current PICC line. Spoke to PICC team, can use current PICC if X-ray normal for PICC placement. Will wait final ID recs.   - IR aspiration of R hip: synovial fluid analysis negative. Cell count, more neutrophilic predominant.   - C/w Vancomycin, renally dose. F/u with Vanc level in am   - Pain management: Percocet PRN for moderate pain and added Dilaudid PRN for severe pain  - ANDREA/PVR:  Moderate bilateral lower extremity arterial flow limitation localizing to the popliteal and infrapopliteal levels, will start ASA and Atorvastatin. Vascular consulted   - BCLX NGTD   - WBAT, PT/OT as tolerated   - Ortho following, no surgical intervention at this time   - Podiatry following, no acute intervention from podiatry  - ID following   - Podiatry following

## 2023-04-16 NOTE — PROGRESS NOTE ADULT - SUBJECTIVE AND OBJECTIVE BOX
[facility] Division of Hospital Medicine  Jie Lozano MD  Available via MS Teams  Pager: [phone]    SUBJECTIVE / OVERNIGHT EVENTS: Patient seen and examined at bedside. No acute overnight events. Pt denies dyspnea, chest pain fevers, chills, cough, HA, dizziness, syncope, chest palpitations, abd pain, n/v/d, urinary or bowel changes, active sites of bleeding or any other symptoms at this time.    ADDITIONAL REVIEW OF SYSTEMS: negative   MEDICATIONS  (STANDING):  albuterol/ipratropium for Nebulization 3 milliLiter(s) Nebulizer every 6 hours  ascorbic acid 500 milliGRAM(s) Oral daily  aspirin  chewable 81 milliGRAM(s) Oral daily  atorvastatin 40 milliGRAM(s) Oral at bedtime  chlorhexidine 2% Cloths 1 Application(s) Topical daily  clonazePAM  Tablet 0.5 milliGRAM(s) Oral two times a day  collagenase Ointment 1 Application(s) Topical daily  furosemide   Injectable 20 milliGRAM(s) IV Push daily  gabapentin 400 milliGRAM(s) Oral three times a day  heparin   Injectable 5000 Unit(s) SubCutaneous every 12 hours  hydrALAZINE 100 milliGRAM(s) Oral every 8 hours  lidocaine   4% Patch 1 Patch Transdermal daily  losartan 100 milliGRAM(s) Oral daily  melatonin 5 milliGRAM(s) Oral at bedtime  mirtazapine 7.5 milliGRAM(s) Oral at bedtime  multivitamin 1 Tablet(s) Oral daily  NIFEdipine XL 60 milliGRAM(s) Oral daily  vancomycin  IVPB 750 milliGRAM(s) IV Intermittent every 24 hours    MEDICATIONS  (PRN):  acetaminophen     Tablet .. 650 milliGRAM(s) Oral every 6 hours PRN Temp greater or equal to 38C (100.4F), Mild Pain (1 - 3)  clonazePAM  Tablet 0.5 milliGRAM(s) Oral two times a day PRN for anxiety  HYDROmorphone   Tablet 2 milliGRAM(s) Oral every 8 hours PRN Severe Pain (7 - 10)  oxycodone    5 mG/acetaminophen 325 mG 1 Tablet(s) Oral every 6 hours PRN Moderate Pain (4 - 6)  zolpidem 5 milliGRAM(s) Oral at bedtime PRN Insomnia      I&O's Summary    15 Apr 2023 07:01  -  16 Apr 2023 07:00  --------------------------------------------------------  IN: 240 mL / OUT: 2250 mL / NET: -2010 mL    16 Apr 2023 07:01  -  16 Apr 2023 16:39  --------------------------------------------------------  IN: 240 mL / OUT: 1300 mL / NET: -1060 mL        PHYSICAL EXAM:  Vital Signs Last 24 Hrs  T(C): 36.9 (16 Apr 2023 11:30), Max: 36.9 (16 Apr 2023 11:30)  T(F): 98.5 (16 Apr 2023 11:30), Max: 98.5 (16 Apr 2023 11:30)  HR: 76 (16 Apr 2023 11:30) (70 - 78)  BP: 106/55 (16 Apr 2023 11:30) (106/55 - 144/67)  BP(mean): --  RR: 18 (16 Apr 2023 11:30) (18 - 18)  SpO2: 97% (16 Apr 2023 11:30) (95% - 97%)    Parameters below as of 16 Apr 2023 11:30  Patient On (Oxygen Delivery Method): nasal cannula  O2 Flow (L/min): 4    CONSTITUTIONAL: NAD, well-developed, well-groomed  EYES: PERRLA; conjunctiva and sclera clear  ENMT: Moist oral mucosa, no pharyngeal injection or exudates; normal dentition  NECK: Supple, no palpable masses; no thyromegaly  RESPIRATORY: Normal respiratory effort; lungs are clear to auscultation bilaterally  CARDIOVASCULAR: Regular rate and rhythm, normal S1 and S2, no murmur/rub/gallop; No lower extremity edema; Peripheral pulses are 2+ bilaterally  ABDOMEN: Nontender to palpation, normoactive bowel sounds, no rebound/guarding; No hepatosplenomegaly  MUSCULOSKELETAL:   UE/LE 5/5.  Limited assessment of RIGHT LE due to severe RIGHT hip pain  PSYCH: A+O to person, place, and time; affect appropriate  NEUROLOGY: CN 2-12 are intact and symmetric; no gross sensory deficits   SKIN: R lateral malleolus woundns    LABS:    04-15    137  |  100  |  40<H>  ----------------------------<  100<H>  4.8   |  31  |  0.93    Ca    8.8      15 Apr 2023 07:18  Phos  5.0     04-15  Mg     2.0     04-15      Culture - Joint Fluid (collected 13 Apr 2023 19:04)  Source: Hip Synovial Fluid  Gram Stain (14 Apr 2023 03:37):    No polymorphonuclear leukocytes seen    No organisms seen    by cytocentrifuge  Preliminary Report (15 Apr 2023 09:00):    No growth to date.      SARS-CoV-2: NotDetec (11 Apr 2023 21:08)      RADIOLOGY & ADDITIONAL TESTS:  New Results Reviewed Today:   New Imaging Personally Reviewed Today:  New Electrocardiogram Personally Reviewed Today:  Prior or Outpatient Records Reviewed Today:    COMMUNICATION:  Care Discussed with Consultants/Other Providers and Details of Discussion:  Discussions with Patient/Family:  PCP Communication:

## 2023-04-16 NOTE — PROGRESS NOTE ADULT - TIME BILLING
chart reviewing, history taking, physical exam, assessment and documentation, including speaking to specialist/SW/CM regarding the management.

## 2023-04-16 NOTE — PROGRESS NOTE ADULT - PROBLEM SELECTOR PLAN 2
BCLX NGTD. Has PICC line from [facility]? attempted to call daughter again today to gather collateral  info regarding admission at [facility], daughter did not . Will aim to call daughter tomorrow.   - C/w Vancomycin given suspicion of chronic OM   - TTE negative for vegetations  - F/u with random Vanc level in am

## 2023-04-17 LAB
CULTURE RESULTS: SIGNIFICANT CHANGE UP
SPECIMEN SOURCE: SIGNIFICANT CHANGE UP
VANCOMYCIN FLD-MCNC: 40.6 UG/ML
VANCOMYCIN TROUGH SERPL-MCNC: 41.6 UG/ML — CRITICAL HIGH (ref 10–20)

## 2023-04-17 PROCEDURE — 99232 SBSQ HOSP IP/OBS MODERATE 35: CPT

## 2023-04-17 PROCEDURE — 99233 SBSQ HOSP IP/OBS HIGH 50: CPT

## 2023-04-17 PROCEDURE — 71045 X-RAY EXAM CHEST 1 VIEW: CPT | Mod: 26

## 2023-04-17 RX ORDER — FUROSEMIDE 40 MG
40 TABLET ORAL DAILY
Refills: 0 | Status: DISCONTINUED | OUTPATIENT
Start: 2023-04-17 | End: 2023-04-19

## 2023-04-17 RX ADMIN — HYDROMORPHONE HYDROCHLORIDE 2 MILLIGRAM(S): 2 INJECTION INTRAMUSCULAR; INTRAVENOUS; SUBCUTANEOUS at 09:48

## 2023-04-17 RX ADMIN — HYDROMORPHONE HYDROCHLORIDE 2 MILLIGRAM(S): 2 INJECTION INTRAMUSCULAR; INTRAVENOUS; SUBCUTANEOUS at 02:00

## 2023-04-17 RX ADMIN — Medication 100 MILLIGRAM(S): at 23:02

## 2023-04-17 RX ADMIN — Medication 1 APPLICATION(S): at 14:41

## 2023-04-17 RX ADMIN — Medication 100 MILLIGRAM(S): at 14:39

## 2023-04-17 RX ADMIN — ZOLPIDEM TARTRATE 5 MILLIGRAM(S): 10 TABLET ORAL at 23:42

## 2023-04-17 RX ADMIN — Medication 0.5 MILLIGRAM(S): at 18:04

## 2023-04-17 RX ADMIN — GABAPENTIN 400 MILLIGRAM(S): 400 CAPSULE ORAL at 06:02

## 2023-04-17 RX ADMIN — HEPARIN SODIUM 5000 UNIT(S): 5000 INJECTION INTRAVENOUS; SUBCUTANEOUS at 06:03

## 2023-04-17 RX ADMIN — OXYCODONE AND ACETAMINOPHEN 1 TABLET(S): 5; 325 TABLET ORAL at 13:50

## 2023-04-17 RX ADMIN — OXYCODONE AND ACETAMINOPHEN 1 TABLET(S): 5; 325 TABLET ORAL at 14:54

## 2023-04-17 RX ADMIN — HYDROMORPHONE HYDROCHLORIDE 2 MILLIGRAM(S): 2 INJECTION INTRAMUSCULAR; INTRAVENOUS; SUBCUTANEOUS at 17:45

## 2023-04-17 RX ADMIN — HYDROMORPHONE HYDROCHLORIDE 2 MILLIGRAM(S): 2 INJECTION INTRAMUSCULAR; INTRAVENOUS; SUBCUTANEOUS at 18:58

## 2023-04-17 RX ADMIN — Medication 0.5 MILLIGRAM(S): at 06:02

## 2023-04-17 RX ADMIN — CHLORHEXIDINE GLUCONATE 1 APPLICATION(S): 213 SOLUTION TOPICAL at 12:00

## 2023-04-17 RX ADMIN — OXYCODONE AND ACETAMINOPHEN 1 TABLET(S): 5; 325 TABLET ORAL at 23:01

## 2023-04-17 RX ADMIN — GABAPENTIN 400 MILLIGRAM(S): 400 CAPSULE ORAL at 14:39

## 2023-04-17 RX ADMIN — LOSARTAN POTASSIUM 100 MILLIGRAM(S): 100 TABLET, FILM COATED ORAL at 06:02

## 2023-04-17 RX ADMIN — HYDROMORPHONE HYDROCHLORIDE 2 MILLIGRAM(S): 2 INJECTION INTRAMUSCULAR; INTRAVENOUS; SUBCUTANEOUS at 01:23

## 2023-04-17 RX ADMIN — HYDROMORPHONE HYDROCHLORIDE 2 MILLIGRAM(S): 2 INJECTION INTRAMUSCULAR; INTRAVENOUS; SUBCUTANEOUS at 11:09

## 2023-04-17 RX ADMIN — Medication 100 MILLIGRAM(S): at 06:03

## 2023-04-17 RX ADMIN — Medication 250 MILLIGRAM(S): at 06:03

## 2023-04-17 RX ADMIN — OXYCODONE AND ACETAMINOPHEN 1 TABLET(S): 5; 325 TABLET ORAL at 06:45

## 2023-04-17 RX ADMIN — Medication 60 MILLIGRAM(S): at 06:03

## 2023-04-17 RX ADMIN — OXYCODONE AND ACETAMINOPHEN 1 TABLET(S): 5; 325 TABLET ORAL at 06:02

## 2023-04-17 RX ADMIN — GABAPENTIN 400 MILLIGRAM(S): 400 CAPSULE ORAL at 23:01

## 2023-04-17 RX ADMIN — ATORVASTATIN CALCIUM 40 MILLIGRAM(S): 80 TABLET, FILM COATED ORAL at 23:01

## 2023-04-17 RX ADMIN — MIRTAZAPINE 7.5 MILLIGRAM(S): 45 TABLET, ORALLY DISINTEGRATING ORAL at 23:01

## 2023-04-17 RX ADMIN — Medication 5 MILLIGRAM(S): at 23:02

## 2023-04-17 RX ADMIN — Medication 3 MILLILITER(S): at 06:02

## 2023-04-17 NOTE — CONSULT NOTE ADULT - PROVIDER SPECIALTY LIST ADULT
Podiatry
Cardiology
Infectious Disease
Intervent Radiology
Vascular Surgery
Orthopedics
Pain Medicine

## 2023-04-17 NOTE — PROGRESS NOTE ADULT - SUBJECTIVE AND OBJECTIVE BOX
77yPatient is a 77y old  Female who presents with a chief complaint of Sent in from [facility] for worsening RIGHT hip pain (17 Apr 2023 17:53)      Interval history:  Afebrile, continues to experience pain.       Allergies:   No Known Allergies      Antimicrobials:      REVIEW OF SYSTEMS:  No chest pain   No SOB  No abdominal pain  No rash.       Vital Signs Last 24 Hrs  T(C): 36.8 (04-17-23 @ 20:50), Max: 36.9 (04-17-23 @ 06:30)  T(F): 98.3 (04-17-23 @ 20:50), Max: 98.4 (04-17-23 @ 06:30)  HR: 81 (04-17-23 @ 20:50) (68 - 81)  BP: 126/66 (04-17-23 @ 20:50) (100/54 - 126/66)  BP(mean): --  RR: 18 (04-17-23 @ 20:50) (18 - 18)  SpO2: 93% (04-17-23 @ 20:50) (93% - 100%)      PHYSICAL EXAM:  Pt in no acute distress, alert, awake. sitting in chair.   breathing comfortably on NC.   non distended abdomen  Rt LE edema with chronic skin changes and erythema.   no phlebitis         No new labs performed today.         Radiology:  < from: VA Physiol Extremity Lower 3+ Level, BI (04.14.23 @ 12:19) >  IMPRESSION: Moderate bilateral lower extremity arterial flow limitation   localizing to the popliteal and infrapopliteal levels.

## 2023-04-17 NOTE — PROGRESS NOTE ADULT - ASSESSMENT
76 y/o F w/ R lateral malleolus wound to capsule  - Pt seen and evaluated  - Afebrile, no leukocytosis ESR 70 CRP 4.2  - R lateral malleolus wound to level of capsule, fibrogranular wound bed, indurated borders, no malodor or purulence, appears ischemic in etiology  - R Ankle XR: no gas, no OM   - ANDREA/PVR: R ANDREA 0.45,  R TBI 0.42, L ANDREA 0.50, L TBI 0.45, waveforms diminished @ metatarsals   - Recommend vascular consult given abnormal ABIs  - No acute intervention from podiatry  - Continue IV ABx per ID  - Discussed with attending 78 y/o F w/ R lateral malleolus wound to capsule  - Pt seen and evaluated  - Afebrile, no leukocytosis ESR 70 CRP 4.2  - R lateral malleolus wound to level of capsule, fibrogranular wound bed, indurated borders, no malodor or purulence, appears ischemic in etiology  - R Ankle XR: no gas, no OM   - Recommend offloading bilateral heels with zflows when in bed  - Recommend wound care with santyl to right ankle wound   - ANDREA/PVR: R ANDREA 0.45,  R TBI 0.42, L ANDREA 0.50, L TBI 0.45, waveforms diminished @ metatarsals   - Recommend vascular consult given abnormal ABIs  - No acute intervention from podiatry  - Continue IV ABx per ID  - Discussed with attending

## 2023-04-17 NOTE — PROGRESS NOTE ADULT - PROBLEM SELECTOR PLAN 2
BCLX NGTD. Has PICC line from [facility]  - C/w Vancomycin given suspicion of chronic OM   - TTE negative for vegetations  - F/u with random Vanc level in am. Hold Vancomycin 750mg q24 for now until am vancomycin results (elevated levels, likely taken during IV infusion)  - spoke with daughter 4/17 to obtain OSH records

## 2023-04-17 NOTE — PROGRESS NOTE ADULT - ASSESSMENT
76 y/o F--history of revision of a RIGHT USAMA in April 2019 with second revision of RIGHT hip Sept 2019, essential HTN, chronic anxiety disorder with a recent hospitalization at [facility] 3 weeks ago in the setting of apparent MRSA bacteremia with patient on IV vancomycin 750 mg Q12H with rifampin as a synergistic agent. Patient with severe pain despite oral Rx at [facility] and was sent to the ER.      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  MRSA infection of the hip last time which lead to surgery.       PLAN:   C/w vanco, level elevated, but collected after 2 hours of administration, its not a trough, c/w same dose.   monitor vancomycin trough and creatinine to avoid nephrotoxicity and ensure efficacy   no need for rifampin, especially if the concern is hip as a source, pt with chronic OM, the treatment for chronic OM is surgical debridement and not abx.   Risk of interaction with other meds with rifampin very high.   follow up repeat blood cx, ntd  Ortho on board  s/p IR guided aspiration of the rt hip joint. cell count not compatible with infection but pt on abx already and glucose very low suggestive of infection.   synovial fluid cx NTD.  Ankle ulcer, s/p podiatry eval.   Awaiting records form [facility].  TTE with no vegetations.       Plan discussed with medicine np    Aurelio Montoya  Please contact through MS Teams   If no response or past 5 pm/weekend call [phone].

## 2023-04-17 NOTE — CONSULT NOTE ADULT - SUBJECTIVE AND OBJECTIVE BOX
Vascular Surgery Consult  Consulting attending: Dr. Reyes    HPI:  Patient is a 77 year old female with PMHx significant for HTN, and HLD presenting with hip pain for 1 month concerning for osteomyelitis. Patient has a history THR (c/b multiple infections and revisions). Has history of MRSA bacteremia. Came to hospital with RUE PICC line in place - stated by patient to be placed at [facility].    Vascular surgery consulted for right foot wound located over lateral malleolus. Patient states she has calf claudication symptoms bilaterally; however, recently her ability to ambulate has been limited by her difficulty breathing. Denies rest pain, having seen a vascular surgeon in the past, and having had any prior non-invasive studies or invasive vascular surgery procedures performed in the past.      PAST MEDICAL HISTORY:  MRSA bacteremia    Essential hypertension    CAD (coronary artery disease)    Elevated brain natriuretic peptide (BNP) level        PAST SURGICAL HISTORY:  History of arthroplasty of right hip        MEDICATIONS:  acetaminophen     Tablet .. 650 milliGRAM(s) Oral every 6 hours PRN  albuterol/ipratropium for Nebulization 3 milliLiter(s) Nebulizer every 6 hours  ascorbic acid 500 milliGRAM(s) Oral daily  aspirin  chewable 81 milliGRAM(s) Oral daily  atorvastatin 40 milliGRAM(s) Oral at bedtime  chlorhexidine 2% Cloths 1 Application(s) Topical daily  clonazePAM  Tablet 0.5 milliGRAM(s) Oral two times a day  clonazePAM  Tablet 0.5 milliGRAM(s) Oral two times a day PRN  collagenase Ointment 1 Application(s) Topical daily  furosemide    Tablet 40 milliGRAM(s) Oral daily  gabapentin 400 milliGRAM(s) Oral three times a day  heparin   Injectable 5000 Unit(s) SubCutaneous every 12 hours  hydrALAZINE 100 milliGRAM(s) Oral every 8 hours  HYDROmorphone   Tablet 2 milliGRAM(s) Oral every 8 hours PRN  lidocaine   4% Patch 1 Patch Transdermal daily  losartan 100 milliGRAM(s) Oral daily  melatonin 5 milliGRAM(s) Oral at bedtime  mirtazapine 7.5 milliGRAM(s) Oral at bedtime  multivitamin 1 Tablet(s) Oral daily  NIFEdipine XL 60 milliGRAM(s) Oral daily  oxycodone    5 mG/acetaminophen 325 mG 1 Tablet(s) Oral every 6 hours PRN  zolpidem 5 milliGRAM(s) Oral at bedtime PRN      ALLERGIES:  No Known Allergies      VITALS & I/Os:  Vital Signs Last 24 Hrs  T(C): 36.7 (18 Apr 2023 05:48), Max: 36.8 (17 Apr 2023 11:33)  T(F): 98.1 (18 Apr 2023 05:48), Max: 98.3 (17 Apr 2023 11:33)  HR: 83 (18 Apr 2023 05:48) (76 - 83)  BP: 133/81 (18 Apr 2023 05:48) (100/54 - 149/68)  BP(mean): --  RR: 18 (18 Apr 2023 05:48) (18 - 18)  SpO2: 96% (18 Apr 2023 05:48) (93% - 100%)    Parameters below as of 18 Apr 2023 05:48  Patient On (Oxygen Delivery Method): nasal cannula  O2 Flow (L/min): 2      I&O's Summary    17 Apr 2023 07:01  -  18 Apr 2023 07:00  --------------------------------------------------------  IN: 480 mL / OUT: 1550 mL / NET: -1070 mL        PHYSICAL EXAM:  Gen: NAD, resting in chair  CV: Regular rate when evaluated  Resp: Nasal cannula in place  Ext:  - LLE without wounds noted  - RLE with lateral malleolar wound present; 1cm in diameter, no purulence or drainage noted  Vasc:  - Bilateral femoral and popliteal pulses are palpable  - Bilateral DP and PT doppler signals present  - Capillary refill in bilateral toes is appropriate      LABS:    No leukocytosis noted.      Lactate:                  IMAGING:  < from: [facility] Physiol Extremity Lower 3+ Level, BI (04.14.23 @ 12:19) >    ACC: [id number] EXAM:  PHYSIOL EXTREM LOW 3+ LEV BI   ORDERED BY: PRITESH GARCIA     PROCEDURE DATE:  04/14/2023          INTERPRETATION:  Clinical information: History of smoking, nondiabetic,   hypertension, presents with nonhealing right ankle ulcer.    COMPARISON: None available.    TECHNIQUE: Lower extremity arterial Doppler study.    FINDINGS: Ankle brachial index measures 0.45 on the right and 0.50 on the   left. Segmental pressure gradients are present across both knees. Toe   brachial index measures 0.42 on the right and 0.45 on the left.    PVR waveforms are diminished in amplitude and pulsatility at the   metatarsal and digital levels. Digital PPG's are diminished in   pulsatility, worse on the right.    IMPRESSION: Moderate bilateral lower extremity arterial flow limitation   localizing to the popliteal and infrapopliteal levels.    --- End of Report ---            JAMES DILLARD MD; Attending Radiologist  This document has been electronically signed. Apr 14 2023  2:13PM    < end of copied text >

## 2023-04-17 NOTE — PROGRESS NOTE ADULT - SUBJECTIVE AND OBJECTIVE BOX
Patient is a 77y old  Female who presents with a chief complaint of Sent in from [facility] for worsening RIGHT hip pain (16 Apr 2023 16:38)       INTERVAL HPI/OVERNIGHT EVENTS:  Patient seen and evaluated at bedside.  Pt is resting comfortable in NAD. Denies N/V/F/C.  Pain rated at X/10    Allergies    No Known Allergies    Intolerances        Vital Signs Last 24 Hrs  T(C): 36.9 (17 Apr 2023 06:30), Max: 36.9 (16 Apr 2023 11:30)  T(F): 98.4 (17 Apr 2023 06:30), Max: 98.5 (16 Apr 2023 11:30)  HR: 68 (17 Apr 2023 06:30) (68 - 76)  BP: 124/68 (17 Apr 2023 06:30) (104/60 - 146/66)  BP(mean): --  RR: 18 (17 Apr 2023 06:30) (18 - 18)  SpO2: 97% (17 Apr 2023 06:30) (97% - 98%)    Parameters below as of 17 Apr 2023 06:30  Patient On (Oxygen Delivery Method): nasal cannula  O2 Flow (L/min): 4      LABS:              CAPILLARY BLOOD GLUCOSE          Lower Extremity Physical Exam:  Vasular: DP/PT 0/4 R, DP/PT 1/4 L, CFT < 3 seconds B/L, Temperature gradient warm to cool, B/L.   Neuro: Epicritic sensation intact to the level of the ankle, B/L.  Musculoskeletal/Ortho: s/p R USAMA & revision 9/2022  Skin: R lateral malleolus wound to level of capsule, fibrogranular wound bed, indurated borders, no malodor or purulence.    RADIOLOGY & ADDITIONAL TESTS:    < from: Xray Ankle Complete 3 Views, Right (04.13.23 @ 14:34) >    ACC: [id number] EXAM:  XR ANKLE COMP MIN 3 VIEWS RT   ORDERED BY: PRITESH GARCIA     PROCEDURE DATE:  04/13/2023          INTERPRETATION:  CLINICAL INDICATION: right ankle wound    EXAM:  Frontal oblique lateral right ankle from 4/13/2023 at 1434. No similar   prior studies available for comparison.    IMPRESSION:  Focal soft tissue defect/ulceration over lateral malleolus without   adjacent tracking gas collections or gross radiographic evidence for   underlying osteomyelitis however if there is further concern, MRI   suggested to further assess as warranted.    No fractures or dislocations.    Congruent ankle mortise with smooth and intact talar dome. Preserved   remaining visualized joint spaces and no joint margin erosions.    Tiny plantar calcaneal enthesophyte.    Generalized osteopenia otherwise no discrete suspicious lytic or blastic   lesions.    --- End of Report ---            TREVOR MERRITT MD; Attending Radiologist  This document has been electronically signed. Apr 13 2023  4:03PM    < end of copied text >      < from: [facility] Physiol Extremity Lower 3+ Level, BI (04.14.23 @ 12:19) >    ACC: [id number] EXAM:  PHYSIOL EXTREM LOW 3+ LEV BI   ORDERED BY: PRITESH GARCIA     PROCEDURE DATE:  04/14/2023          INTERPRETATION:  Clinical information: History of smoking, nondiabetic,   hypertension, presents with nonhealing right ankle ulcer.    COMPARISON: None available.    TECHNIQUE: Lower extremity arterial Doppler study.    FINDINGS: Ankle brachial index measures 0.45 on the right and 0.50 on the   left. Segmental pressure gradients are present across both knees. Toe   brachial index measures 0.42 on the right and 0.45 on the left.    PVR waveforms are diminished in amplitude and pulsatility at the   metatarsal and digital levels. Digital PPG's are diminished in   pulsatility, worse on the right.    IMPRESSION: Moderate bilateral lower extremity arterial flow limitation   localizing to the popliteal and infrapopliteal levels.    --- End of Report ---            JAMES DILLARD MD; Attending Radiologist  This document has been electronically signed. Apr 14 2023  2:13PM    < end of copied text >

## 2023-04-17 NOTE — PROGRESS NOTE ADULT - SUBJECTIVE AND OBJECTIVE BOX
Patient is a 77y old  Female who presents with a chief complaint of Sent in from [facility] for worsening RIGHT hip pain (17 Apr 2023 13:29)        SUBJECTIVE / OVERNIGHT EVENTS: Patient had no acute events overnight. Patient seen and examined at bedside this morning. Vanco level >40 but drawn right after IV vanco given. Patient reports hip pain unchanged in the last few days despite recieving dilaudid PO and oxycodone around the clock. Reports morphine works better but from prior documentation, patient is poor historian, stating morphine does not help with pain. Unable to quantify her pain and how much if improves with oral opioids.     ROS:     MEDICATIONS  (STANDING):  albuterol/ipratropium for Nebulization 3 milliLiter(s) Nebulizer every 6 hours  ascorbic acid 500 milliGRAM(s) Oral daily  aspirin  chewable 81 milliGRAM(s) Oral daily  atorvastatin 40 milliGRAM(s) Oral at bedtime  chlorhexidine 2% Cloths 1 Application(s) Topical daily  clonazePAM  Tablet 0.5 milliGRAM(s) Oral two times a day  collagenase Ointment 1 Application(s) Topical daily  furosemide    Tablet 40 milliGRAM(s) Oral daily  gabapentin 400 milliGRAM(s) Oral three times a day  heparin   Injectable 5000 Unit(s) SubCutaneous every 12 hours  hydrALAZINE 100 milliGRAM(s) Oral every 8 hours  lidocaine   4% Patch 1 Patch Transdermal daily  losartan 100 milliGRAM(s) Oral daily  melatonin 5 milliGRAM(s) Oral at bedtime  mirtazapine 7.5 milliGRAM(s) Oral at bedtime  multivitamin 1 Tablet(s) Oral daily  NIFEdipine XL 60 milliGRAM(s) Oral daily    MEDICATIONS  (PRN):  acetaminophen     Tablet .. 650 milliGRAM(s) Oral every 6 hours PRN Temp greater or equal to 38C (100.4F), Mild Pain (1 - 3)  clonazePAM  Tablet 0.5 milliGRAM(s) Oral two times a day PRN for anxiety  HYDROmorphone   Tablet 2 milliGRAM(s) Oral every 8 hours PRN Severe Pain (7 - 10)  oxycodone    5 mG/acetaminophen 325 mG 1 Tablet(s) Oral every 6 hours PRN Moderate Pain (4 - 6)  zolpidem 5 milliGRAM(s) Oral at bedtime PRN Insomnia      Vital Signs Last 24 Hrs  T(C): 36.8 (17 Apr 2023 11:33), Max: 36.9 (17 Apr 2023 06:30)  T(F): 98.3 (17 Apr 2023 11:33), Max: 98.4 (17 Apr 2023 06:30)  HR: 80 (17 Apr 2023 14:35) (68 - 80)  BP: 112/55 (17 Apr 2023 14:35) (100/54 - 146/66)  BP(mean): --  RR: 18 (17 Apr 2023 14:41) (18 - 18)  SpO2: 93% (17 Apr 2023 14:41) (93% - 100%)    Parameters below as of 17 Apr 2023 14:41  Patient On (Oxygen Delivery Method): nasal cannula  O2 Flow (L/min): 2    CAPILLARY BLOOD GLUCOSE        I&O's Summary    16 Apr 2023 07:01  -  17 Apr 2023 07:00  --------------------------------------------------------  IN: 565 mL / OUT: 1300 mL / NET: -735 mL        PHYSICAL EXAM  GENERAL: NAD, lying comfortably in bed, frail  HEENT:  Atraumatic, Normocephalic, EOMI, conjunctiva and sclera clear, no LAD  CHEST/LUNG: Clear to auscultation bilaterally; No wheeze  HEART: RRR, S1 and S2 No murmurs, rubs, or gallops  ABDOMEN: Soft, Nontender, Nondistended; Bowel sounds present  EXTREMITIES:  2+ Peripheral Pulses, No clubbing, cyanosis, or edema. UE/LE 5/5.  Limited assessment of RIGHT LE due to severe RIGHT hip pain. right lateral ankle dressing cdi  NEURO: awake and alert, tangential thoughts  SKIN: No rashes or lesions visible skin    LABS:                      RADIOLOGY & ADDITIONAL TESTS:      Labs Personally Reviewed  Imaging Personally Reviewed  Consultant(s) Notes Reviewed

## 2023-04-17 NOTE — CONSULT NOTE ADULT - ASSESSMENT
77 year old female with right foot wound. Nonpalpable pulses, R ANDREA 0.45. Currently afebrile, without leukocytosis, and without signs of systemic infection. Findings consistent with Isiah Stage 5 CLTI (tissue loss present).     Plan:  - Patient would benefit from RLE revascularization of inline flow given presence of tissue loss  - Plan for RLE angiogram, possible angioplasty, possible stent on Wednesday 4/19 in OR  - Would appreciate documentation of medical optimization and cardiac risk stratification  - NPO after midnight on Tuesday night  - Obtain 4AM preop labs on Wednesday morning (CBC, BMP, Mag, Phos, coags)    Discussed with vascular surgery attending on call, Dr. Reyes.      Vascular Surgery  [id number]

## 2023-04-17 NOTE — PROGRESS NOTE ADULT - PROBLEM SELECTOR PLAN 1
s/p THR (c/b multiple infections and revisions) w/ R hip pain for 1 month. Afebrile, no leukocytosis ESR 70 CRP 4.2. 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 MRSA infection of the hip last time which lead to surgery.    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 Changes may be related to loosening or infection in the appropriate setting.    - Spoke to ID fellow over the weekend, will likely need 4-6 weeks of IV abx, will keep current PICC line. Spoke to PICC team, can use current PICC if X-ray normal for PICC placement. Will wait final ID recs.   - IR aspiration of R hip: synovial fluid analysis negative. Cell count, more neutrophilic predominant.   - C/w Vancomycin, renally dose. F/u with Vanc level in am   - Pain management: Percocet PRN for moderate pain and added Dilaudid PRN for severe pain. Reconsult pain  - ANDREA/PVR:  Moderate bilateral lower extremity arterial flow limitation localizing to the popliteal and infrapopliteal levels, will start ASA and Atorvastatin. Vascular consulted   - BCLX NGTD   - WBAT, PT/OT as tolerated   - Ortho following, no surgical intervention at this time   - Podiatry following, no acute intervention from podiatry  - ID following   - Podiatry following

## 2023-04-17 NOTE — CONSULT NOTE ADULT - REASON FOR ADMISSION
Sent in from [facility] for worsening RIGHT hip pain

## 2023-04-17 NOTE — PROGRESS NOTE ADULT - NSPROGADDITIONALINFOA_GEN_ALL_CORE
Updated daughter via phone. She will attempt to obtain records from [facility]. Dc vancomycin 750mg daily. Check AM vanc level tomorrow and restart vancomycin if levels improved. Vascular surgery consulted- likely angiogram later this week. Cardiology consult AM. Reconsult pain AM. Daughter waiting to speak with ortho (paged)    Alyson Mota MD  Division of Hospital Medicine  Available on Microsoft Teams Updated daughter via phone. She will attempt to obtain records from [facility]. Dc vancomycin 750mg daily. Check AM vanc level tomorrow and restart vancomycin if levels improved. Vascular surgery consulted- likely angiogram later this week. Cardiology consult AM. Reconsult pain AM. Daughter waiting to speak with ortho (ortho team will reach out to family tomorrow)    Alyson Mota MD  Division of Hospital Medicine  Available on Microsoft Teams

## 2023-04-17 NOTE — CONSULT NOTE ADULT - CONSULT REASON
Pain management
PAD eval - Right foot wound
Acute on Chronic Diastolic Heart Failure; MRSA bacteremia r/o Endocarditis
MRSA Bacteremia
R hip
Right ankle wound
Right hip aspiration

## 2023-04-17 NOTE — CONSULT NOTE ADULT - CONSULT REQUESTED DATE/TIME
12-Apr-2023 17:41
11-Apr-2023 23:59
18-Apr-2023 07:07
13-Apr-2023
13-Apr-2023 13:09
13-Apr-2023 13:16
12-Apr-2023 12:08

## 2023-04-17 NOTE — PROGRESS NOTE ADULT - SUBJECTIVE AND OBJECTIVE BOX
DATE OF SERVICE: 04-17-23 @ 13:29    Patient is a 77y old  Female who presents with a chief complaint of Sent in from [facility] for worsening RIGHT hip pain (17 Apr 2023 08:35)      INTERVAL HISTORY: Feels ok.    REVIEW OF SYSTEMS:  CONSTITUTIONAL: No weakness  EYES/ENT: No visual changes;  No throat pain   NECK: No pain or stiffness  RESPIRATORY: No cough, wheezing; No shortness of breath  CARDIOVASCULAR: No chest pain or palpitations  GASTROINTESTINAL: No abdominal  pain. No nausea, vomiting, or hematemesis  GENITOURINARY: No dysuria, frequency or hematuria  NEUROLOGICAL: No stroke like symptoms  SKIN: No rashes    TELEMETRY Personally reviewed: SR 70-80  	  MEDICATIONS:  furosemide    Tablet 40 milliGRAM(s) Oral daily  hydrALAZINE 100 milliGRAM(s) Oral every 8 hours  losartan 100 milliGRAM(s) Oral daily  NIFEdipine XL 60 milliGRAM(s) Oral daily        PHYSICAL EXAM:  T(C): 36.8 (04-17-23 @ 11:33), Max: 36.9 (04-17-23 @ 06:30)  HR: 76 (04-17-23 @ 12:35) (68 - 76)  BP: 101/61 (04-17-23 @ 12:35) (100/54 - 146/66)  RR: 18 (04-17-23 @ 12:35) (18 - 18)  SpO2: 98% (04-17-23 @ 12:35) (97% - 100%)  Wt(kg): --  I&O's Summary    16 Apr 2023 07:01  -  17 Apr 2023 07:00  --------------------------------------------------------  IN: 565 mL / OUT: 1300 mL / NET: -735 mL          Appearance: In no distress	  HEENT:    PERRL, EOMI	  Cardiovascular:  S1 S2, No JVD  Respiratory: Lungs clear to auscultation	  Gastrointestinal:  Soft, Non-tender, + BS	  Vascularature:  No edema of LE  Psychiatric: Appropriate affect   Neuro: no acute focal deficits                     Labs personally reviewed      ASSESSMENT/PLAN: 	      78 y/o F--history of revision of a RIGHT USAMA in April 2019 with second revision of RIGHT hip Sept 2019, HTN, chronic anxiety disorder, recent hospitalization at [facility] 3 weeks ago for MRSA bacteremia with patient on IV vancomycin 750 mg Q12H with rifampin as a synergistic agent. Patient denies fever, chills, rigors.  No chest pain/pressure.  NO palpitations.   Patient denies dyspnoea but with poor exercise capacity.     1. MRSA bacteremia  - Blood Cx 4/11 NGTD  - Afebrile, no leukocytosis  - TRUDY if Cultures repeat + and if ID recommends  - TTE shows preserved EF, no evidence of vegetations    2. Chronic Diastolic Heart Failure  - CXR shows small left pleural effusion  - BNP 3206  - TTE shows preserved EF, no WMA, moderate AS  - c/w Lasix 40mg PO daily    3. Hypertension  - c/w losartan 100mg daily  - c/w hydralazine 100mg PO TID  - c/w Nifedipine 60mg PO daily    4. DVT ppx  - c/w SQ Heparin              Amarilys Vásquez, AG-NP   Domenico Christianson DO [facility]  Cardiovascular Medicine  [address] [place] [address], [address]  Available through call or text on [employer] TEAMs  Office: [phone]   DATE OF SERVICE: 04-17-23 @ 13:29    Patient is a 77y old  Female who presents with a chief complaint of Sent in from [facility] for worsening RIGHT hip pain (17 Apr 2023 08:35)      INTERVAL HISTORY: Feels ok.    REVIEW OF SYSTEMS:  CONSTITUTIONAL: No weakness  EYES/ENT: No visual changes;  No throat pain   NECK: No pain or stiffness  RESPIRATORY: No cough, wheezing; No shortness of breath  CARDIOVASCULAR: No chest pain or palpitations  GASTROINTESTINAL: No abdominal  pain. No nausea, vomiting, or hematemesis  GENITOURINARY: No dysuria, frequency or hematuria  NEUROLOGICAL: No stroke like symptoms  SKIN: No rashes    TELEMETRY Personally reviewed: SR 70-80  	  MEDICATIONS:  furosemide    Tablet 40 milliGRAM(s) Oral daily  hydrALAZINE 100 milliGRAM(s) Oral every 8 hours  losartan 100 milliGRAM(s) Oral daily  NIFEdipine XL 60 milliGRAM(s) Oral daily        PHYSICAL EXAM:  T(C): 36.8 (04-17-23 @ 11:33), Max: 36.9 (04-17-23 @ 06:30)  HR: 76 (04-17-23 @ 12:35) (68 - 76)  BP: 101/61 (04-17-23 @ 12:35) (100/54 - 146/66)  RR: 18 (04-17-23 @ 12:35) (18 - 18)  SpO2: 98% (04-17-23 @ 12:35) (97% - 100%)  Wt(kg): --  I&O's Summary    16 Apr 2023 07:01  -  17 Apr 2023 07:00  --------------------------------------------------------  IN: 565 mL / OUT: 1300 mL / NET: -735 mL          Appearance: In no distress	  HEENT:    PERRL, EOMI	  Cardiovascular:  S1 S2, No JVD  Respiratory: Lungs clear to auscultation	  Gastrointestinal:  Soft, Non-tender, + BS	  Vascularature:  No edema of LE  Psychiatric: Appropriate affect   Neuro: no acute focal deficits           Labs personally reviewed      ASSESSMENT/PLAN: 	      78 y/o F--history of revision of a RIGHT USAMA in April 2019 with second revision of RIGHT hip Sept 2019, HTN, chronic anxiety disorder, recent hospitalization at [facility] 3 weeks ago for MRSA bacteremia with patient on IV vancomycin 750 mg Q12H with rifampin as a synergistic agent. Patient denies fever, chills, rigors.  No chest pain/pressure.  NO palpitations.   Patient denies dyspnoea but with poor exercise capacity.     1. MRSA bacteremia  - Blood Cx 4/11 NGTD  - Afebrile, no leukocytosis  - TRUDY if Cultures repeat + and if ID recommends  - TTE shows preserved EF, no evidence of vegetations    2. Chronic Diastolic Heart Failure  - CXR shows small left pleural effusion  - BNP 3206  - TTE shows preserved EF, no WMA, moderate AS  - c/w Lasix 40mg PO daily    3. Hypertension  - c/w losartan 100mg daily  - c/w hydralazine 100mg PO TID  - c/w Nifedipine 60mg PO daily    4. DVT ppx  - c/w SQ Heparin              Amarilys Vásquez, AG-NP   Domenico Christianson DO [facility]  Cardiovascular Medicine  [address] [place] [address], [address]  Available through call or text on Microsoft TEAMs  Office: [phone]

## 2023-04-18 ENCOUNTER — TRANSCRIPTION ENCOUNTER (OUTPATIENT)
Age: 78
End: 2023-04-18

## 2023-04-18 DIAGNOSIS — I73.9 PERIPHERAL VASCULAR DISEASE, UNSPECIFIED: ICD-10-CM

## 2023-04-18 LAB
ANION GAP SERPL CALC-SCNC: 11 MMOL/L — SIGNIFICANT CHANGE UP (ref 5–17)
BUN SERPL-MCNC: 58 MG/DL — HIGH (ref 7–23)
CALCIUM SERPL-MCNC: 9.2 MG/DL — SIGNIFICANT CHANGE UP (ref 8.4–10.5)
CHLORIDE SERPL-SCNC: 98 MMOL/L — SIGNIFICANT CHANGE UP (ref 96–108)
CO2 SERPL-SCNC: 28 MMOL/L — SIGNIFICANT CHANGE UP (ref 22–31)
CREAT SERPL-MCNC: 0.91 MG/DL — SIGNIFICANT CHANGE UP (ref 0.5–1.3)
EGFR: 66 ML/MIN/1.73M2 — SIGNIFICANT CHANGE UP
GLUCOSE SERPL-MCNC: 125 MG/DL — HIGH (ref 70–99)
MAGNESIUM SERPL-MCNC: 2 MG/DL — SIGNIFICANT CHANGE UP (ref 1.6–2.6)
PHOSPHATE SERPL-MCNC: 4.9 MG/DL — HIGH (ref 2.5–4.5)
POTASSIUM SERPL-MCNC: 4.9 MMOL/L — SIGNIFICANT CHANGE UP (ref 3.5–5.3)
POTASSIUM SERPL-SCNC: 4.9 MMOL/L — SIGNIFICANT CHANGE UP (ref 3.5–5.3)
SARS-COV-2 RNA SPEC QL NAA+PROBE: SIGNIFICANT CHANGE UP
SODIUM SERPL-SCNC: 137 MMOL/L — SIGNIFICANT CHANGE UP (ref 135–145)
VANCOMYCIN TROUGH SERPL-MCNC: 33.4 UG/ML — CRITICAL HIGH (ref 10–20)

## 2023-04-18 PROCEDURE — 99233 SBSQ HOSP IP/OBS HIGH 50: CPT

## 2023-04-18 PROCEDURE — 99232 SBSQ HOSP IP/OBS MODERATE 35: CPT

## 2023-04-18 PROCEDURE — 99223 1ST HOSP IP/OBS HIGH 75: CPT

## 2023-04-18 RX ORDER — CLONAZEPAM 1 MG
0.5 TABLET ORAL
Refills: 0 | Status: DISCONTINUED | OUTPATIENT
Start: 2023-04-18 | End: 2023-04-19

## 2023-04-18 RX ORDER — SENNA PLUS 8.6 MG/1
2 TABLET ORAL AT BEDTIME
Refills: 0 | Status: DISCONTINUED | OUTPATIENT
Start: 2023-04-18 | End: 2023-04-19

## 2023-04-18 RX ORDER — OXYCODONE AND ACETAMINOPHEN 5; 325 MG/1; MG/1
1 TABLET ORAL ONCE
Refills: 0 | Status: DISCONTINUED | OUTPATIENT
Start: 2023-04-18 | End: 2023-04-18

## 2023-04-18 RX ORDER — POLYETHYLENE GLYCOL 3350 17 G/17G
17 POWDER, FOR SOLUTION ORAL
Refills: 0 | Status: DISCONTINUED | OUTPATIENT
Start: 2023-04-18 | End: 2023-04-19

## 2023-04-18 RX ORDER — ACETAMINOPHEN 500 MG
650 TABLET ORAL EVERY 6 HOURS
Refills: 0 | Status: DISCONTINUED | OUTPATIENT
Start: 2023-04-18 | End: 2023-04-19

## 2023-04-18 RX ORDER — OXYCODONE HYDROCHLORIDE 5 MG/1
10 TABLET ORAL EVERY 6 HOURS
Refills: 0 | Status: DISCONTINUED | OUTPATIENT
Start: 2023-04-18 | End: 2023-04-19

## 2023-04-18 RX ORDER — TRAMADOL HYDROCHLORIDE 50 MG/1
50 TABLET ORAL EVERY 8 HOURS
Refills: 0 | Status: DISCONTINUED | OUTPATIENT
Start: 2023-04-18 | End: 2023-04-19

## 2023-04-18 RX ORDER — ZOLPIDEM TARTRATE 10 MG/1
5 TABLET ORAL AT BEDTIME
Refills: 0 | Status: DISCONTINUED | OUTPATIENT
Start: 2023-04-18 | End: 2023-04-19

## 2023-04-18 RX ADMIN — Medication 1 TABLET(S): at 11:01

## 2023-04-18 RX ADMIN — OXYCODONE AND ACETAMINOPHEN 1 TABLET(S): 5; 325 TABLET ORAL at 00:22

## 2023-04-18 RX ADMIN — OXYCODONE HYDROCHLORIDE 10 MILLIGRAM(S): 5 TABLET ORAL at 18:51

## 2023-04-18 RX ADMIN — ZOLPIDEM TARTRATE 5 MILLIGRAM(S): 10 TABLET ORAL at 21:19

## 2023-04-18 RX ADMIN — Medication 0.5 MILLIGRAM(S): at 06:02

## 2023-04-18 RX ADMIN — Medication 5 MILLIGRAM(S): at 21:20

## 2023-04-18 RX ADMIN — CHLORHEXIDINE GLUCONATE 1 APPLICATION(S): 213 SOLUTION TOPICAL at 11:03

## 2023-04-18 RX ADMIN — GABAPENTIN 400 MILLIGRAM(S): 400 CAPSULE ORAL at 06:02

## 2023-04-18 RX ADMIN — HYDROMORPHONE HYDROCHLORIDE 2 MILLIGRAM(S): 2 INJECTION INTRAMUSCULAR; INTRAVENOUS; SUBCUTANEOUS at 02:24

## 2023-04-18 RX ADMIN — OXYCODONE AND ACETAMINOPHEN 1 TABLET(S): 5; 325 TABLET ORAL at 06:03

## 2023-04-18 RX ADMIN — Medication 100 MILLIGRAM(S): at 21:19

## 2023-04-18 RX ADMIN — OXYCODONE AND ACETAMINOPHEN 1 TABLET(S): 5; 325 TABLET ORAL at 00:00

## 2023-04-18 RX ADMIN — Medication 650 MILLIGRAM(S): at 18:18

## 2023-04-18 RX ADMIN — OXYCODONE HYDROCHLORIDE 10 MILLIGRAM(S): 5 TABLET ORAL at 12:22

## 2023-04-18 RX ADMIN — Medication 100 MILLIGRAM(S): at 06:02

## 2023-04-18 RX ADMIN — OXYCODONE HYDROCHLORIDE 10 MILLIGRAM(S): 5 TABLET ORAL at 13:15

## 2023-04-18 RX ADMIN — MIRTAZAPINE 7.5 MILLIGRAM(S): 45 TABLET, ORALLY DISINTEGRATING ORAL at 21:19

## 2023-04-18 RX ADMIN — ATORVASTATIN CALCIUM 40 MILLIGRAM(S): 80 TABLET, FILM COATED ORAL at 21:19

## 2023-04-18 RX ADMIN — Medication 650 MILLIGRAM(S): at 17:36

## 2023-04-18 RX ADMIN — Medication 81 MILLIGRAM(S): at 11:01

## 2023-04-18 RX ADMIN — Medication 500 MILLIGRAM(S): at 11:01

## 2023-04-18 RX ADMIN — LOSARTAN POTASSIUM 100 MILLIGRAM(S): 100 TABLET, FILM COATED ORAL at 06:02

## 2023-04-18 RX ADMIN — GABAPENTIN 400 MILLIGRAM(S): 400 CAPSULE ORAL at 13:08

## 2023-04-18 RX ADMIN — OXYCODONE AND ACETAMINOPHEN 1 TABLET(S): 5; 325 TABLET ORAL at 01:22

## 2023-04-18 RX ADMIN — Medication 0.5 MILLIGRAM(S): at 17:36

## 2023-04-18 RX ADMIN — HYDROMORPHONE HYDROCHLORIDE 2 MILLIGRAM(S): 2 INJECTION INTRAMUSCULAR; INTRAVENOUS; SUBCUTANEOUS at 11:01

## 2023-04-18 RX ADMIN — HYDROMORPHONE HYDROCHLORIDE 2 MILLIGRAM(S): 2 INJECTION INTRAMUSCULAR; INTRAVENOUS; SUBCUTANEOUS at 12:01

## 2023-04-18 RX ADMIN — HEPARIN SODIUM 5000 UNIT(S): 5000 INJECTION INTRAVENOUS; SUBCUTANEOUS at 06:03

## 2023-04-18 RX ADMIN — HYDROMORPHONE HYDROCHLORIDE 2 MILLIGRAM(S): 2 INJECTION INTRAMUSCULAR; INTRAVENOUS; SUBCUTANEOUS at 03:20

## 2023-04-18 RX ADMIN — Medication 60 MILLIGRAM(S): at 06:03

## 2023-04-18 RX ADMIN — Medication 100 MILLIGRAM(S): at 13:10

## 2023-04-18 RX ADMIN — OXYCODONE AND ACETAMINOPHEN 1 TABLET(S): 5; 325 TABLET ORAL at 06:57

## 2023-04-18 RX ADMIN — GABAPENTIN 400 MILLIGRAM(S): 400 CAPSULE ORAL at 21:19

## 2023-04-18 RX ADMIN — Medication 40 MILLIGRAM(S): at 06:02

## 2023-04-18 RX ADMIN — OXYCODONE HYDROCHLORIDE 10 MILLIGRAM(S): 5 TABLET ORAL at 19:22

## 2023-04-18 RX ADMIN — Medication 1 APPLICATION(S): at 11:04

## 2023-04-18 NOTE — PROGRESS NOTE ADULT - NSPROGADDITIONALINFOA_GEN_ALL_CORE
Discussed with ID. Medical records request sent to [place]. Plan for vascular angiogram tomorrow. Hold vancomycin. Check AM vanco level.     d/w ACP    Alyson Mota MD  Division of Hospital Medicine  Available on Microsoft Teams

## 2023-04-18 NOTE — PROGRESS NOTE ADULT - ASSESSMENT
78 y/o F--history of revision of a RIGHT USAMA in April 2019 with second revision of RIGHT hip Sept 2019, essential HTN, chronic anxiety disorder with a recent hospitalization at [facility] 3 weeks ago in the setting of apparent MRSA bacteremia with patient on IV vancomycin 750 mg Q12H with rifampin as a synergistic agent. Patient with severe pain despite oral Rx at [facility] and was sent to the ER.      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  MRSA infection of the hip last time which lead to surgery.       PLAN:   C/w vanco, level elevated, pt did not receive vanco today  plan to continue vanco by level.    no need for rifampin, especially if the concern is hip as a source, pt with chronic OM, the treatment for chronic OM is surgical debridement and not abx.   Risk of interaction with other meds with rifampin very high.   follow up repeat blood cx, ntd  Ortho on board  s/p IR guided aspiration of the rt hip joint. cell count not compatible with infection but pt on abx already and glucose very low suggestive of infection.   synovial fluid cx NTD.   discussed with pt's daughter, pt had aspiration of the hip performed at OSH which was positive for MRSA.   almost 4-5 weeks of therapy.   Ankle ulcer, s/p podiatry eval.   Awaiting records form [facility] for determining exact duration of abx.  TTE with no vegetations.       Plan discussed with medicine attending.     Aurelio Montoya  Please contact through MS Teams   If no response or past 5 pm/weekend call [phone].

## 2023-04-18 NOTE — PROVIDER CONTACT NOTE (CRITICAL VALUE NOTIFICATION) - ASSESSMENT
Patient is A&Ox4. vitals are stable. patient denies redness, itchiness, rash, chest pain, SOB, and lightheadedness
vss, no fevers
Pt win&ox3-4. VSS.

## 2023-04-18 NOTE — PROGRESS NOTE ADULT - SUBJECTIVE AND OBJECTIVE BOX
DATE OF SERVICE: 04-18-23 @ 11:14    Patient is a 77y old  Female who presents with a chief complaint of Sent in from [facility] for worsening RIGHT hip pain (18 Apr 2023 08:58)      INTERVAL HISTORY: Feels ok. Reports ongoing pain in hip.    REVIEW OF SYSTEMS:  CONSTITUTIONAL: No weakness  EYES/ENT: No visual changes;  No throat pain   NECK: No pain or stiffness  RESPIRATORY: No cough, wheezing; No shortness of breath  CARDIOVASCULAR: No chest pain or palpitations  GASTROINTESTINAL: No abdominal  pain. No nausea, vomiting, or hematemesis  GENITOURINARY: No dysuria, frequency or hematuria  NEUROLOGICAL: No stroke like symptoms  SKIN: No rashes    TELEMETRY Personally reviewed: SR 60-80  	  MEDICATIONS:  furosemide    Tablet 40 milliGRAM(s) Oral daily  hydrALAZINE 100 milliGRAM(s) Oral every 8 hours  losartan 100 milliGRAM(s) Oral daily  NIFEdipine XL 60 milliGRAM(s) Oral daily        PHYSICAL EXAM:  T(C): 36.7 (04-18-23 @ 05:48), Max: 36.8 (04-17-23 @ 11:33)  HR: 83 (04-18-23 @ 05:48) (76 - 83)  BP: 133/81 (04-18-23 @ 05:48) (100/54 - 149/68)  RR: 18 (04-18-23 @ 05:48) (18 - 18)  SpO2: 96% (04-18-23 @ 05:48) (93% - 100%)  Wt(kg): --  I&O's Summary    17 Apr 2023 07:01  -  18 Apr 2023 07:00  --------------------------------------------------------  IN: 480 mL / OUT: 1550 mL / NET: -1070 mL          Appearance: In no distress	  HEENT:    PERRL, EOMI	  Cardiovascular:  S1 S2, No JVD  Respiratory: Lungs clear to auscultation	  Gastrointestinal:  Soft, Non-tender, + BS	  Vascularature:  No edema of LE  Psychiatric: Appropriate affect   Neuro: no acute focal deficits                     Labs personally reviewed      ASSESSMENT/PLAN: 	    78 y/o F--history of revision of a RIGHT USAMA in April 2019 with second revision of RIGHT hip Sept 2019, HTN, chronic anxiety disorder, recent hospitalization at [facility] 3 weeks ago for MRSA bacteremia with patient on IV vancomycin 750 mg Q12H with rifampin as a synergistic agent. Patient denies fever, chills, rigors.  No chest pain/pressure.  NO palpitations.   Patient denies dyspnoea but with poor exercise capacity.     1. MRSA bacteremia  - Blood Cx 4/11 NGTD  - Afebrile, no leukocytosis  - TRUDY if Cultures repeat + and if ID recommends  - TTE shows preserved EF, no evidence of vegetations    2. Chronic Diastolic Heart Failure  - CXR shows small left pleural effusion  - BNP 3206  - TTE shows preserved EF, no WMA, moderate AS  - c/w Lasix 40mg PO daily  - Apears euvolemic    3. Hypertension  - c/w losartan 100mg daily  - c/w hydralazine 100mg PO TID  - c/w Nifedipine 60mg PO daily    4. DVT ppx  - c/w SQ Heparin    5. Cardiac Risk Stratification  - ECG NSR with no ischemia noted  - TTE shows preserved EF, no WMA  - Patient not in decompensated HF  - No hx of tachy meme arrhythmias  - No hx of severe MS/AS  - Patient is low-mod risk for low risk peripheral angiogram. No contraindication to proceed.        EMILIANA Wilson DO [facility]  Cardiovascular Medicine  [address], [address]  Available through call or text on [employer]  Office: [phone]

## 2023-04-18 NOTE — PROGRESS NOTE ADULT - PROBLEM SELECTOR PLAN 1
s/p THR (c/b multiple infections and revisions) w/ R hip pain for 1 month. Afebrile, no leukocytosis ESR 70 CRP 4.2. 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 MRSA infection of the hip last time which lead to surgery.    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 Changes may be related to loosening or infection in the appropriate setting.    - Medical records request to [facility] sent 4/18  - IR aspiration of R hip: synovial fluid analysis negative. Cell count, more neutrophilic predominant.   - C/w Vancomycin, renally dose. F/u with Vanc level in am   - Pain management: Reconsult pain: 10mg oxycodone q6hr for severe pain. tylenol 650mg q6hr standing  - BCLX NGTD   - WBAT, PT/OT as tolerated   - Ortho following, no surgical intervention at this time   - Podiatry following, no acute intervention from podiatry  - ID following   - Podiatry following

## 2023-04-18 NOTE — PROVIDER CONTACT NOTE (CRITICAL VALUE NOTIFICATION) - BACKGROUND
admit with right hip pain, hx of THR with osteomyelitis on iv vanco
Pt admitted for COPD exacerbation & R hip pain. PMH CAD, HTN, s/p THR.
Patient admitted for COPD

## 2023-04-18 NOTE — PROGRESS NOTE ADULT - SUBJECTIVE AND OBJECTIVE BOX
77yPatient is a 77y old  Female who presents with a chief complaint of Sent in from [facility] for worsening RIGHT hip pain (18 Apr 2023 15:14)      Interval history:  Afebrile, continues to complain of pain.      Allergies:   No Known Allergies      Antimicrobials:      REVIEW OF SYSTEMS:  No chest pain   No abdominal pain  No rash.       Vital Signs Last 24 Hrs  T(C): 37 (04-18-23 @ 22:10), Max: 37 (04-18-23 @ 22:10)  T(F): 98.6 (04-18-23 @ 22:10), Max: 98.6 (04-18-23 @ 22:10)  HR: 75 (04-18-23 @ 22:10) (75 - 83)  BP: 106/49 (04-18-23 @ 22:10) (106/49 - 149/68)  BP(mean): --  RR: 18 (04-18-23 @ 22:10) (18 - 18)  SpO2: 95% (04-18-23 @ 22:10) (95% - 96%)      PHYSICAL EXAM:  Pt in no acute distress, alert, awake. laying in bed.   breathing comfortably on NC.   non distended abdomen  Rt LE edema with chronic skin changes and erythema.   no phlebitis         04-18    137  |  98  |  58<H>  ----------------------------<  125<H>  4.9   |  28  |  0.91    Ca    9.2      18 Apr 2023 11:13  Phos  4.9     04-18  Mg     2.0     04-18        < from: Xray Chest 1 View- PORTABLE-Routine (Xray Chest 1 View- PORTABLE-Routine .) (04.17.23 @ 10:22) >  IMPRESSION:  Right-sided PICC line with tip in the SVC.          Radiology:

## 2023-04-18 NOTE — PROGRESS NOTE ADULT - ASSESSMENT
78 y/o F w/ R lateral malleolus wound to capsule  - Pt seen and evaluated  - Afebrile, no leukocytosis ESR 70 CRP 4.2  - R lateral malleolus wound to level of capsule, fibrogranular wound bed, indurated borders, well circumscribed, no malodor or purulence.  - R Ankle XR: no gas, no OM   - Recommend offloading bilateral heels with zflows when in bed  - Recommend wound care with santyl to right ankle wound   - ANDREA/PVR: R ANDREA 0.45,  R TBI 0.42, L ANDREA 0.50, L TBI 0.45, waveforms diminished @ metatarsals  - Continue IV ABx per ID  - Vasc recs appreciated   - No acute intervention from podiatry  - Seen with attending

## 2023-04-18 NOTE — PROGRESS NOTE ADULT - ASSESSMENT
77F w/ PMHx of R USAMA (c/b multiple infections, and revisions with Dr. Be) HTN, HLD c/o R hip pain for 1 month, concerning for OM on vancomycin, no orthopedic intervention

## 2023-04-18 NOTE — PROVIDER CONTACT NOTE (CRITICAL VALUE NOTIFICATION) - ACTION/TREATMENT ORDERED:
Provider notified and aware.
PA aware, will reach out to ID
Vanco trough ordered for tomorrow morning before vanco

## 2023-04-18 NOTE — PROVIDER CONTACT NOTE (CRITICAL VALUE NOTIFICATION) - RECOMMENDATIONS
Notify provider.
hold dose of vanco
Vanco trough was drawn after vanco was given, redraw tomorrow, pre fourth dose.

## 2023-04-18 NOTE — PROGRESS NOTE ADULT - SUBJECTIVE AND OBJECTIVE BOX
Patient is a 77y old  Female who presents with a chief complaint of Sent in from [facility] for worsening RIGHT hip pain (18 Apr 2023 11:14)        SUBJECTIVE / OVERNIGHT EVENTS: Patient had no acute events overnight. Patient seen and examined at bedside this morning. Continues to endorse right thigh pain on oxycodone and Dilaudid but again, could not elaborate if pain meds make any improvement. Reports she had improvement at the [facility] with perocet 10mg but when offered to try oxycodone 10mg, patient resistant.     ROS: neg    MEDICATIONS  (STANDING):  albuterol/ipratropium for Nebulization 3 milliLiter(s) Nebulizer every 6 hours  ascorbic acid 500 milliGRAM(s) Oral daily  aspirin  chewable 81 milliGRAM(s) Oral daily  atorvastatin 40 milliGRAM(s) Oral at bedtime  chlorhexidine 2% Cloths 1 Application(s) Topical daily  clonazePAM  Tablet 0.5 milliGRAM(s) Oral two times a day  collagenase Ointment 1 Application(s) Topical daily  furosemide    Tablet 40 milliGRAM(s) Oral daily  gabapentin 400 milliGRAM(s) Oral three times a day  heparin   Injectable 5000 Unit(s) SubCutaneous every 12 hours  hydrALAZINE 100 milliGRAM(s) Oral every 8 hours  lidocaine   4% Patch 1 Patch Transdermal daily  losartan 100 milliGRAM(s) Oral daily  melatonin 5 milliGRAM(s) Oral at bedtime  mirtazapine 7.5 milliGRAM(s) Oral at bedtime  multivitamin 1 Tablet(s) Oral daily  NIFEdipine XL 60 milliGRAM(s) Oral daily  polyethylene glycol 3350 17 Gram(s) Oral two times a day  senna 2 Tablet(s) Oral at bedtime    MEDICATIONS  (PRN):  acetaminophen     Tablet .. 650 milliGRAM(s) Oral every 6 hours PRN Temp greater or equal to 38C (100.4F), Mild Pain (1 - 3)  clonazePAM  Tablet 0.5 milliGRAM(s) Oral two times a day PRN for anxiety  oxyCODONE    IR 10 milliGRAM(s) Oral every 6 hours PRN Severe Pain (7 - 10)  zolpidem 5 milliGRAM(s) Oral at bedtime PRN Insomnia      Vital Signs Last 24 Hrs  T(C): 36.8 (18 Apr 2023 13:08), Max: 36.8 (17 Apr 2023 20:50)  T(F): 98.3 (18 Apr 2023 13:08), Max: 98.3 (17 Apr 2023 20:50)  HR: 83 (18 Apr 2023 13:08) (81 - 83)  BP: 133/54 (18 Apr 2023 13:08) (126/66 - 149/68)  BP(mean): --  RR: 18 (18 Apr 2023 13:08) (18 - 18)  SpO2: 95% (18 Apr 2023 13:08) (93% - 96%)    Parameters below as of 18 Apr 2023 13:08  Patient On (Oxygen Delivery Method): nasal cannula  O2 Flow (L/min): 2    CAPILLARY BLOOD GLUCOSE        I&O's Summary    17 Apr 2023 07:01  -  18 Apr 2023 07:00  --------------------------------------------------------  IN: 480 mL / OUT: 1550 mL / NET: -1070 mL        PHYSICAL EXAM  GENERAL: NAD, lying comfortably in bed, frail  HEENT:  Atraumatic, Normocephalic, EOMI, conjunctiva and sclera clear, no LAD  CHEST/LUNG: Clear to auscultation bilaterally; No wheeze  HEART: RRR, S1 and S2 No murmurs, rubs, or gallops  ABDOMEN: Soft, Nontender, Nondistended; Bowel sounds present  EXTREMITIES:  2+ Peripheral Pulses, No clubbing, cyanosis, or edema. UE/LE 5/5.  Limited assessment of RIGHT LE due to severe RIGHT hip pain. right lateral ankle dressing cdi  NEURO: awake and alert, tangential thoughts  SKIN: No rashes or lesions visible skin    LABS:    04-18    137  |  98  |  58<H>  ----------------------------<  125<H>  4.9   |  28  |  0.91    Ca    9.2      18 Apr 2023 11:13  Phos  4.9     04-18  Mg     2.0     04-18          RADIOLOGY & ADDITIONAL TESTS:      Labs Personally Reviewed  Imaging Personally Reviewed  Consultant(s) Notes Reviewed

## 2023-04-18 NOTE — PROGRESS NOTE ADULT - SUBJECTIVE AND OBJECTIVE BOX
Patient is a 77y old  Female who presents with a chief complaint of Sent in from [facility] for worsening RIGHT hip pain (17 Apr 2023 19:00)       INTERVAL HPI/OVERNIGHT EVENTS:  Patient seen and evaluated at bedside.  Pt is resting comfortable in NAD. Denies N/V/F/C.  Pain rated at X/10    Allergies    No Known Allergies    Intolerances        Vital Signs Last 24 Hrs  T(C): 36.7 (18 Apr 2023 05:48), Max: 36.8 (17 Apr 2023 11:33)  T(F): 98.1 (18 Apr 2023 05:48), Max: 98.3 (17 Apr 2023 11:33)  HR: 83 (18 Apr 2023 05:48) (76 - 83)  BP: 133/81 (18 Apr 2023 05:48) (100/54 - 149/68)  BP(mean): --  RR: 18 (18 Apr 2023 05:48) (18 - 18)  SpO2: 96% (18 Apr 2023 05:48) (93% - 100%)    Parameters below as of 18 Apr 2023 05:48  Patient On (Oxygen Delivery Method): nasal cannula  O2 Flow (L/min): 2      LABS:              CAPILLARY BLOOD GLUCOSE          Lower Extremity Physical Exam:  Vasular: DP/PT 0/4 R, DP/PT 1/4 L, CFT < 3 seconds B/L, Temperature gradient warm to cool, B/L.   Neuro: Epicritic sensation intact to the level of the ankle, B/L.  Musculoskeletal/Ortho: s/p R USAMA & revision 9/2022  Skin: R lateral malleolus wound to level of capsule, fibrogranular wound bed, indurated borders, well circumscribed, no malodor or purulence.    RADIOLOGY & ADDITIONAL TESTS:

## 2023-04-19 ENCOUNTER — APPOINTMENT (OUTPATIENT)
Dept: VASCULAR SURGERY | Facility: HOSPITAL | Age: 78
End: 2023-04-19

## 2023-04-19 PROBLEM — Z00.00 ENCOUNTER FOR PREVENTIVE HEALTH EXAMINATION: Noted: 2023-04-19

## 2023-04-19 LAB
ANION GAP SERPL CALC-SCNC: 9 MMOL/L — SIGNIFICANT CHANGE UP (ref 5–17)
APTT BLD: 31.9 SEC — SIGNIFICANT CHANGE UP (ref 27.5–35.5)
BLD GP AB SCN SERPL QL: NEGATIVE — SIGNIFICANT CHANGE UP
BUN SERPL-MCNC: 60 MG/DL — HIGH (ref 7–23)
CALCIUM SERPL-MCNC: 9.3 MG/DL — SIGNIFICANT CHANGE UP (ref 8.4–10.5)
CHLORIDE SERPL-SCNC: 99 MMOL/L — SIGNIFICANT CHANGE UP (ref 96–108)
CO2 SERPL-SCNC: 30 MMOL/L — SIGNIFICANT CHANGE UP (ref 22–31)
CREAT SERPL-MCNC: 0.89 MG/DL — SIGNIFICANT CHANGE UP (ref 0.5–1.3)
EGFR: 68 ML/MIN/1.73M2 — SIGNIFICANT CHANGE UP
GLUCOSE SERPL-MCNC: 104 MG/DL — HIGH (ref 70–99)
HCT VFR BLD CALC: 35 % — SIGNIFICANT CHANGE UP (ref 34.5–45)
HGB BLD-MCNC: 11 G/DL — LOW (ref 11.5–15.5)
INR BLD: 1.11 RATIO — SIGNIFICANT CHANGE UP (ref 0.88–1.16)
MAGNESIUM SERPL-MCNC: 2.2 MG/DL — SIGNIFICANT CHANGE UP (ref 1.6–2.6)
MCHC RBC-ENTMCNC: 31.4 GM/DL — LOW (ref 32–36)
MCHC RBC-ENTMCNC: 31.5 PG — SIGNIFICANT CHANGE UP (ref 27–34)
MCV RBC AUTO: 100.3 FL — HIGH (ref 80–100)
NRBC # BLD: 0 /100 WBCS — SIGNIFICANT CHANGE UP (ref 0–0)
PHOSPHATE SERPL-MCNC: 5.4 MG/DL — HIGH (ref 2.5–4.5)
PLATELET # BLD AUTO: 180 K/UL — SIGNIFICANT CHANGE UP (ref 150–400)
POTASSIUM SERPL-MCNC: 5.2 MMOL/L — SIGNIFICANT CHANGE UP (ref 3.5–5.3)
POTASSIUM SERPL-SCNC: 5.2 MMOL/L — SIGNIFICANT CHANGE UP (ref 3.5–5.3)
PROTHROM AB SERPL-ACNC: 12.8 SEC — SIGNIFICANT CHANGE UP (ref 10.5–13.4)
RBC # BLD: 3.49 M/UL — LOW (ref 3.8–5.2)
RBC # FLD: 15.3 % — HIGH (ref 10.3–14.5)
RH IG SCN BLD-IMP: POSITIVE — SIGNIFICANT CHANGE UP
SODIUM SERPL-SCNC: 138 MMOL/L — SIGNIFICANT CHANGE UP (ref 135–145)
VANCOMYCIN FLD-MCNC: 25.1 UG/ML
WBC # BLD: 5.42 K/UL — SIGNIFICANT CHANGE UP (ref 3.8–10.5)
WBC # FLD AUTO: 5.42 K/UL — SIGNIFICANT CHANGE UP (ref 3.8–10.5)

## 2023-04-19 PROCEDURE — 76937 US GUIDE VASCULAR ACCESS: CPT | Mod: 26

## 2023-04-19 PROCEDURE — 75710 ARTERY X-RAYS ARM/LEG: CPT | Mod: 26

## 2023-04-19 PROCEDURE — 36247 INS CATH ABD/L-EXT ART 3RD: CPT | Mod: RT

## 2023-04-19 PROCEDURE — 99233 SBSQ HOSP IP/OBS HIGH 50: CPT

## 2023-04-19 DEVICE — SHEATH INTRODUCER TERUMO PINNACLE PERIPHERAL 5FR X 10CM: Type: IMPLANTABLE DEVICE | Site: RIGHT | Status: FUNCTIONAL

## 2023-04-19 DEVICE — CATH OMNI FLSH 0.035IN 5FRX65: Type: IMPLANTABLE DEVICE | Site: RIGHT | Status: FUNCTIONAL

## 2023-04-19 DEVICE — GUIDEWIRE BENTSON 0.035" X 145CM: Type: IMPLANTABLE DEVICE | Site: RIGHT | Status: FUNCTIONAL

## 2023-04-19 DEVICE — DEVICE CLOSURE 5F MYNX GRIP MUST ORDER MIN OF 10 EA: Type: IMPLANTABLE DEVICE | Site: RIGHT | Status: FUNCTIONAL

## 2023-04-19 DEVICE — CATH QUICK CROSS .014X135CM: Type: IMPLANTABLE DEVICE | Site: RIGHT | Status: FUNCTIONAL

## 2023-04-19 DEVICE — CATH ANGIO GLIDECATH ANGLE 4FR X 100CM: Type: IMPLANTABLE DEVICE | Site: RIGHT | Status: FUNCTIONAL

## 2023-04-19 DEVICE — INTRODUCER SET MICROPUNCTURE ACCESS 21G X 7CM: Type: IMPLANTABLE DEVICE | Site: RIGHT | Status: FUNCTIONAL

## 2023-04-19 DEVICE — GUIDEWIRE RADIFOCUS GLIDEWIRE ANGLED 0.035" X 260CM STIFF: Type: IMPLANTABLE DEVICE | Site: RIGHT | Status: FUNCTIONAL

## 2023-04-19 DEVICE — GUIDEWIRE .014X300X25G: Type: IMPLANTABLE DEVICE | Site: RIGHT | Status: FUNCTIONAL

## 2023-04-19 DEVICE — INTRO SHEATH FLEXOR ANSEL 5F: Type: IMPLANTABLE DEVICE | Site: RIGHT | Status: FUNCTIONAL

## 2023-04-19 DEVICE — GUIDEWIRE RADIFOCUS GLIDEWIRE STANDARD ANGLED TIP 0.035" X 260CM: Type: IMPLANTABLE DEVICE | Site: RIGHT | Status: FUNCTIONAL

## 2023-04-19 DEVICE — CATH QUICK CROSS .035X135CM: Type: IMPLANTABLE DEVICE | Site: RIGHT | Status: FUNCTIONAL

## 2023-04-19 RX ORDER — ONDANSETRON 8 MG/1
4 TABLET, FILM COATED ORAL ONCE
Refills: 0 | Status: DISCONTINUED | OUTPATIENT
Start: 2023-04-19 | End: 2023-04-20

## 2023-04-19 RX ORDER — FENTANYL CITRATE 50 UG/ML
25 INJECTION INTRAVENOUS
Refills: 0 | Status: DISCONTINUED | OUTPATIENT
Start: 2023-04-19 | End: 2023-04-19

## 2023-04-19 RX ADMIN — OXYCODONE HYDROCHLORIDE 10 MILLIGRAM(S): 5 TABLET ORAL at 08:11

## 2023-04-19 RX ADMIN — Medication 100 MILLIGRAM(S): at 13:13

## 2023-04-19 RX ADMIN — Medication 0.5 MILLIGRAM(S): at 06:30

## 2023-04-19 RX ADMIN — Medication 81 MILLIGRAM(S): at 13:13

## 2023-04-19 RX ADMIN — LOSARTAN POTASSIUM 100 MILLIGRAM(S): 100 TABLET, FILM COATED ORAL at 06:17

## 2023-04-19 RX ADMIN — FENTANYL CITRATE 25 MICROGRAM(S): 50 INJECTION INTRAVENOUS at 21:30

## 2023-04-19 RX ADMIN — Medication 1 APPLICATION(S): at 13:14

## 2023-04-19 RX ADMIN — GABAPENTIN 400 MILLIGRAM(S): 400 CAPSULE ORAL at 06:16

## 2023-04-19 RX ADMIN — Medication 650 MILLIGRAM(S): at 14:18

## 2023-04-19 RX ADMIN — Medication 650 MILLIGRAM(S): at 13:13

## 2023-04-19 RX ADMIN — OXYCODONE HYDROCHLORIDE 10 MILLIGRAM(S): 5 TABLET ORAL at 14:11

## 2023-04-19 RX ADMIN — Medication 100 MILLIGRAM(S): at 06:17

## 2023-04-19 RX ADMIN — GABAPENTIN 400 MILLIGRAM(S): 400 CAPSULE ORAL at 13:13

## 2023-04-19 RX ADMIN — Medication 60 MILLIGRAM(S): at 06:17

## 2023-04-19 RX ADMIN — Medication 40 MILLIGRAM(S): at 06:17

## 2023-04-19 RX ADMIN — Medication 0.5 MILLIGRAM(S): at 13:13

## 2023-04-19 RX ADMIN — OXYCODONE HYDROCHLORIDE 10 MILLIGRAM(S): 5 TABLET ORAL at 08:54

## 2023-04-19 RX ADMIN — FENTANYL CITRATE 25 MICROGRAM(S): 50 INJECTION INTRAVENOUS at 22:00

## 2023-04-19 RX ADMIN — OXYCODONE HYDROCHLORIDE 10 MILLIGRAM(S): 5 TABLET ORAL at 01:38

## 2023-04-19 RX ADMIN — FENTANYL CITRATE 25 MICROGRAM(S): 50 INJECTION INTRAVENOUS at 21:15

## 2023-04-19 RX ADMIN — FENTANYL CITRATE 25 MICROGRAM(S): 50 INJECTION INTRAVENOUS at 22:15

## 2023-04-19 NOTE — PROGRESS NOTE ADULT - SUBJECTIVE AND OBJECTIVE BOX
Patient is a 77y old  Female who presents with a chief complaint of Sent in from [facility] for worsening RIGHT hip pain (19 Apr 2023 11:56)        SUBJECTIVE / OVERNIGHT EVENTS: Patient had no acute events overnight. Patient seen and examined at bedside this morning. Patient upset with NPO. Reports 10mg oxycodone brings the pain from 10/10 down "a few points".    ROS: unable to assess    MEDICATIONS  (STANDING):  acetaminophen     Tablet .. 650 milliGRAM(s) Oral every 6 hours  albuterol/ipratropium for Nebulization 3 milliLiter(s) Nebulizer every 6 hours  ascorbic acid 500 milliGRAM(s) Oral daily  aspirin  chewable 81 milliGRAM(s) Oral daily  atorvastatin 40 milliGRAM(s) Oral at bedtime  chlorhexidine 2% Cloths 1 Application(s) Topical daily  clonazePAM  Tablet 0.5 milliGRAM(s) Oral two times a day  collagenase Ointment 1 Application(s) Topical daily  furosemide    Tablet 40 milliGRAM(s) Oral daily  gabapentin 400 milliGRAM(s) Oral three times a day  heparin   Injectable 5000 Unit(s) SubCutaneous every 12 hours  hydrALAZINE 100 milliGRAM(s) Oral every 8 hours  lidocaine   4% Patch 1 Patch Transdermal daily  losartan 100 milliGRAM(s) Oral daily  melatonin 5 milliGRAM(s) Oral at bedtime  mirtazapine 7.5 milliGRAM(s) Oral at bedtime  multivitamin 1 Tablet(s) Oral daily  NIFEdipine XL 60 milliGRAM(s) Oral daily  polyethylene glycol 3350 17 Gram(s) Oral two times a day  senna 2 Tablet(s) Oral at bedtime    MEDICATIONS  (PRN):  clonazePAM  Tablet 0.5 milliGRAM(s) Oral two times a day PRN for anxiety  oxyCODONE    IR 10 milliGRAM(s) Oral every 6 hours PRN Severe Pain (7 - 10)  zolpidem 5 milliGRAM(s) Oral at bedtime PRN Insomnia      Vital Signs Last 24 Hrs  T(C): 36.3 (19 Apr 2023 11:08), Max: 37 (18 Apr 2023 22:10)  T(F): 97.4 (19 Apr 2023 11:08), Max: 98.6 (18 Apr 2023 22:10)  HR: 64 (19 Apr 2023 14:42) (64 - 75)  BP: 93/54 (19 Apr 2023 14:42) (93/54 - 132/65)  BP(mean): --  RR: 18 (19 Apr 2023 14:42) (18 - 18)  SpO2: 98% (19 Apr 2023 14:42) (94% - 98%)    Parameters below as of 19 Apr 2023 14:42  Patient On (Oxygen Delivery Method): nasal cannula  O2 Flow (L/min): 2    CAPILLARY BLOOD GLUCOSE        I&O's Summary    18 Apr 2023 07:01  -  19 Apr 2023 07:00  --------------------------------------------------------  IN: 600 mL / OUT: 1500 mL / NET: -900 mL        PHYSICAL EXAM  GENERAL: NAD, sitting in chair, frail  HEENT:  Atraumatic, Normocephalic, EOMI, conjunctiva and sclera clear, no LAD  CHEST/LUNG: Clear to auscultation bilaterally; No wheeze  HEART: RRR, S1 and S2 No murmurs, rubs, or gallops  ABDOMEN: Soft, Nontender, Nondistended; Bowel sounds present  EXTREMITIES:  2+ Peripheral Pulses, No clubbing, cyanosis, or edema. UE/LE 5/5. right lateral ankle dressing cdi  NEURO: awake and alert, tangential thoughts  SKIN: No rashes or lesions visible skin    LABS:                        11.0   5.42  )-----------( 180      ( 19 Apr 2023 04:12 )             35.0     04-19    138  |  99  |  60<H>  ----------------------------<  104<H>  5.2   |  30  |  0.89    Ca    9.3      19 Apr 2023 04:12  Phos  5.4     04-19  Mg     2.2     04-19      PT/INR - ( 19 Apr 2023 04:12 )   PT: 12.8 sec;   INR: 1.11 ratio         PTT - ( 19 Apr 2023 04:12 )  PTT:31.9 sec            RADIOLOGY & ADDITIONAL TESTS:      Labs Personally Reviewed  Imaging Personally Reviewed  Consultant(s) Notes Reviewed

## 2023-04-19 NOTE — PROGRESS NOTE ADULT - PROBLEM SELECTOR PLAN 2
BCLX NGTD. Has PICC line from [facility]  - C/w Vancomycin given suspicion of chronic OM   - TTE negative for vegetations  - F/u with random Vanc level in am. Hold Vancomycin 750mg q24 for now until am vancomycin results (elevated levels, likely taken during IV infusion)  - spoke with daughter 4/17 and 4/19 to obtain OSH records. Faxed request to OSH for medical records

## 2023-04-19 NOTE — PROGRESS NOTE ADULT - SUBJECTIVE AND OBJECTIVE BOX
Patient is a 77y old  Female who presents with a chief complaint of Sent in from [facility] for worsening RIGHT hip pain (18 Apr 2023 18:47)       INTERVAL HPI/OVERNIGHT EVENTS:  Patient seen and evaluated at bedside.  Pt is resting comfortable in NAD. Denies N/V/F/C.  Pain rated at X/10    Allergies    No Known Allergies    Intolerances        Vital Signs Last 24 Hrs  T(C): 36.9 (19 Apr 2023 06:00), Max: 37 (18 Apr 2023 22:10)  T(F): 98.5 (19 Apr 2023 06:00), Max: 98.6 (18 Apr 2023 22:10)  HR: 70 (19 Apr 2023 06:15) (70 - 83)  BP: 132/65 (19 Apr 2023 06:15) (105/60 - 133/54)  BP(mean): --  RR: 18 (19 Apr 2023 06:00) (18 - 18)  SpO2: 95% (19 Apr 2023 06:00) (95% - 95%)    Parameters below as of 19 Apr 2023 06:00  Patient On (Oxygen Delivery Method): nasal cannula  O2 Flow (L/min): 2      LABS:                        11.0   5.42  )-----------( 180      ( 19 Apr 2023 04:12 )             35.0     04-19    138  |  99  |  60<H>  ----------------------------<  104<H>  5.2   |  30  |  0.89    Ca    9.3      19 Apr 2023 04:12  Phos  5.4     04-19  Mg     2.2     04-19      PT/INR - ( 19 Apr 2023 04:12 )   PT: 12.8 sec;   INR: 1.11 ratio         PTT - ( 19 Apr 2023 04:12 )  PTT:31.9 sec    CAPILLARY BLOOD GLUCOSE          Lower Extremity Physical Exam:  Vasular: DP/PT 0/4 R, DP/PT 1/4 L, CFT < 3 seconds B/L, Temperature gradient warm to cool, B/L.   Neuro: Epicritic sensation intact to the level of the ankle, B/L.  Musculoskeletal/Ortho: s/p R USAMA & revision 9/2022  Skin: R lateral malleolus wound to level of capsule, fibrogranular wound bed, indurated borders, well circumscribed, no malodor or purulence.    RADIOLOGY & ADDITIONAL TESTS:

## 2023-04-19 NOTE — PROGRESS NOTE ADULT - ASSESSMENT
77F w/ PMHx of R USAMA (c/b multiple infections, and revisions with Dr. Be) HTN, HLD c/o R hip pain for 1 month, concerning for OM on vancomycin, no orthopedic intervention, pending angiogram

## 2023-04-19 NOTE — PRE-ANESTHESIA EVALUATION ADULT - NSANTHPMHFT_GEN_ALL_CORE
78 y/o F--history of revision of a RIGHT USAMA in April 2019 with second revision of RIGHT hip Sept 2019, HTN, chronic anxiety disorder, recent hospitalization at [facility] 3 weeks ago for MRSA bacteremia with patient on IV vancomycin 750 mg Q12H with rifampin as a synergistic agent

## 2023-04-19 NOTE — PROGRESS NOTE ADULT - SUBJECTIVE AND OBJECTIVE BOX
DATE OF SERVICE: 04-19-23 @ 11:56    Patient is a 77y old  Female who presents with a chief complaint of Sent in from [facility] for worsening RIGHT hip pain (19 Apr 2023 08:06)      INTERVAL HISTORY: Feels ok.     REVIEW OF SYSTEMS:  CONSTITUTIONAL: No weakness  EYES/ENT: No visual changes;  No throat pain   NECK: No pain or stiffness  RESPIRATORY: No cough, wheezing; No shortness of breath  CARDIOVASCULAR: No chest pain or palpitations  GASTROINTESTINAL: No abdominal  pain. No nausea, vomiting, or hematemesis  GENITOURINARY: No dysuria, frequency or hematuria  NEUROLOGICAL: No stroke like symptoms  SKIN: No rashes    TELEMETRY Personally reviewed: SR 70-80  	  MEDICATIONS:  furosemide    Tablet 40 milliGRAM(s) Oral daily  hydrALAZINE 100 milliGRAM(s) Oral every 8 hours  losartan 100 milliGRAM(s) Oral daily  NIFEdipine XL 60 milliGRAM(s) Oral daily        PHYSICAL EXAM:  T(C): 36.3 (04-19-23 @ 11:08), Max: 37 (04-18-23 @ 22:10)  HR: 69 (04-19-23 @ 11:08) (69 - 83)  BP: 110/58 (04-19-23 @ 11:08) (105/60 - 133/54)  RR: 18 (04-19-23 @ 11:08) (18 - 18)  SpO2: 94% (04-19-23 @ 11:08) (94% - 95%)  Wt(kg): --  I&O's Summary    18 Apr 2023 07:01  -  19 Apr 2023 07:00  --------------------------------------------------------  IN: 600 mL / OUT: 1500 mL / NET: -900 mL          Appearance: In no distress	  HEENT:    PERRL, EOMI	  Cardiovascular:  S1 S2, No JVD  Respiratory: Lungs clear to auscultation	  Gastrointestinal:  Soft, Non-tender, + BS	  Vascularature:  No edema of LE  Psychiatric: Appropriate affect   Neuro: no acute focal deficits                               11.0   5.42  )-----------( 180      ( 19 Apr 2023 04:12 )             35.0     04-19    138  |  99  |  60<H>  ----------------------------<  104<H>  5.2   |  30  |  0.89    Ca    9.3      19 Apr 2023 04:12  Phos  5.4     04-19  Mg     2.2     04-19          Labs personally reviewed      ASSESSMENT/PLAN: 	      78 y/o F--history of revision of a RIGHT USAMA in April 2019 with second revision of RIGHT hip Sept 2019, HTN, chronic anxiety disorder, recent hospitalization at [facility] 3 weeks ago for MRSA bacteremia with patient on IV vancomycin 750 mg Q12H with rifampin as a synergistic agent. Patient denies fever, chills, rigors.  No chest pain/pressure.  NO palpitations.   Patient denies dyspnoea but with poor exercise capacity.     1. MRSA bacteremia  - Blood Cx 4/11 NGTD  - Afebrile, no leukocytosis  - TRUDY if Cultures repeat + and if ID recommends  - TTE shows preserved EF, no evidence of vegetations    2. Chronic Diastolic Heart Failure  - CXR shows small left pleural effusion  - BNP 3206  - TTE shows preserved EF, no WMA, moderate AS  - c/w Lasix 40mg PO daily  - Apears euvolemic    3. Hypertension  - c/w losartan 100mg daily  - c/w hydralazine 100mg PO TID  - c/w Nifedipine 60mg PO daily    4. DVT ppx  - c/w SQ Heparin    5. Cardiac Risk Stratification  - ECG NSR with no ischemia noted  - TTE shows preserved EF, no WMA  - Patient not in decompensated HF  - No hx of tachy meme arrhythmias  - No hx of severe MS/AS  - Patient is low-mod risk for low risk peripheral angiogram. No contraindication to proceed.        Amarilys Vásquez, SERVANDO-NP   Domenico Christianson DO [facility]  Cardiovascular Medicine  [address], [address]  Available through call or text on [employer]  Office: [phone]

## 2023-04-19 NOTE — PROGRESS NOTE ADULT - ASSESSMENT
76 y/o F w/ R lateral malleolus wound to capsule  - Pt seen and evaluated  - Afebrile, no leukocytosis ESR 70 CRP 4.2  - R lateral malleolus wound to level of capsule, fibrogranular wound bed, indurated borders, well circumscribed, no malodor or purulence.  - R Ankle XR: no gas, no OM   - Recommend offloading bilateral heels with zflows when in bed  - Recommend wound care with santyl to right ankle wound   - ANDREA/PVR: R ANDREA 0.45,  R TBI 0.42, L ANDREA 0.50, L TBI 0.45, waveforms diminished @ metatarsals  - Continue IV ABx per ID  - Vasc recs appreciated   - No acute intervention from podiatry  - Podiatry signing off, please reconsult as needed   - Seen with attending

## 2023-04-19 NOTE — PROGRESS NOTE ADULT - PROBLEM SELECTOR PLAN 1
s/p THR (c/b multiple infections and revisions) w/ R hip pain for 1 month. Afebrile, no leukocytosis ESR 70 CRP 4.2. 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 MRSA infection of the hip last time which lead to surgery.    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 Changes may be related to loosening or infection in the appropriate setting.    - Medical records request to [facility] sent 4/18  - IR aspiration of R hip: synovial fluid analysis negative. Cell count, more neutrophilic predominant.   - C/w Vancomycin, renally dose. F/u with Vanc level in am   - Pain management: Reconsult pain: 10mg oxycodone q6hr for severe pain. tylenol 650mg q6hr standing  - BCLX NGTD   - WBAT, PT/OT as tolerated   - Ortho following, no surgical intervention at this time   - Podiatry following, no acute intervention from podiatry  - ID following

## 2023-04-20 LAB
ANION GAP SERPL CALC-SCNC: 10 MMOL/L — SIGNIFICANT CHANGE UP (ref 5–17)
BUN SERPL-MCNC: 50 MG/DL — HIGH (ref 7–23)
CALCIUM SERPL-MCNC: 8.5 MG/DL — SIGNIFICANT CHANGE UP (ref 8.4–10.5)
CHLORIDE SERPL-SCNC: 100 MMOL/L — SIGNIFICANT CHANGE UP (ref 96–108)
CO2 SERPL-SCNC: 27 MMOL/L — SIGNIFICANT CHANGE UP (ref 22–31)
CREAT SERPL-MCNC: 0.91 MG/DL — SIGNIFICANT CHANGE UP (ref 0.5–1.3)
EGFR: 66 ML/MIN/1.73M2 — SIGNIFICANT CHANGE UP
GLUCOSE SERPL-MCNC: 81 MG/DL — SIGNIFICANT CHANGE UP (ref 70–99)
HCT VFR BLD CALC: 32.4 % — LOW (ref 34.5–45)
HGB BLD-MCNC: 10 G/DL — LOW (ref 11.5–15.5)
MAGNESIUM SERPL-MCNC: 2.2 MG/DL — SIGNIFICANT CHANGE UP (ref 1.6–2.6)
MCHC RBC-ENTMCNC: 30.9 GM/DL — LOW (ref 32–36)
MCHC RBC-ENTMCNC: 31.5 PG — SIGNIFICANT CHANGE UP (ref 27–34)
MCV RBC AUTO: 102.2 FL — HIGH (ref 80–100)
NRBC # BLD: 0 /100 WBCS — SIGNIFICANT CHANGE UP (ref 0–0)
PHOSPHATE SERPL-MCNC: 5.5 MG/DL — HIGH (ref 2.5–4.5)
PLATELET # BLD AUTO: 183 K/UL — SIGNIFICANT CHANGE UP (ref 150–400)
POTASSIUM SERPL-MCNC: 5 MMOL/L — SIGNIFICANT CHANGE UP (ref 3.5–5.3)
POTASSIUM SERPL-SCNC: 5 MMOL/L — SIGNIFICANT CHANGE UP (ref 3.5–5.3)
RBC # BLD: 3.17 M/UL — LOW (ref 3.8–5.2)
RBC # FLD: 14.9 % — HIGH (ref 10.3–14.5)
SODIUM SERPL-SCNC: 137 MMOL/L — SIGNIFICANT CHANGE UP (ref 135–145)
VANCOMYCIN FLD-MCNC: 17.2 UG/ML — SIGNIFICANT CHANGE UP
WBC # BLD: 6.66 K/UL — SIGNIFICANT CHANGE UP (ref 3.8–10.5)
WBC # FLD AUTO: 6.66 K/UL — SIGNIFICANT CHANGE UP (ref 3.8–10.5)

## 2023-04-20 PROCEDURE — 99232 SBSQ HOSP IP/OBS MODERATE 35: CPT

## 2023-04-20 PROCEDURE — 99233 SBSQ HOSP IP/OBS HIGH 50: CPT

## 2023-04-20 RX ORDER — CHLORHEXIDINE GLUCONATE 213 G/1000ML
1 SOLUTION TOPICAL DAILY
Refills: 0 | Status: DISCONTINUED | OUTPATIENT
Start: 2023-04-20 | End: 2023-04-26

## 2023-04-20 RX ORDER — ASPIRIN/CALCIUM CARB/MAGNESIUM 324 MG
81 TABLET ORAL DAILY
Refills: 0 | Status: DISCONTINUED | OUTPATIENT
Start: 2023-04-20 | End: 2023-04-26

## 2023-04-20 RX ORDER — LIDOCAINE 4 G/100G
1 CREAM TOPICAL DAILY
Refills: 0 | Status: DISCONTINUED | OUTPATIENT
Start: 2023-04-20 | End: 2023-04-26

## 2023-04-20 RX ORDER — LOSARTAN POTASSIUM 100 MG/1
100 TABLET, FILM COATED ORAL DAILY
Refills: 0 | Status: DISCONTINUED | OUTPATIENT
Start: 2023-04-20 | End: 2023-04-26

## 2023-04-20 RX ORDER — POLYETHYLENE GLYCOL 3350 17 G/17G
17 POWDER, FOR SOLUTION ORAL
Refills: 0 | Status: DISCONTINUED | OUTPATIENT
Start: 2023-04-20 | End: 2023-04-26

## 2023-04-20 RX ORDER — OXYCODONE HYDROCHLORIDE 5 MG/1
10 TABLET ORAL EVERY 6 HOURS
Refills: 0 | Status: DISCONTINUED | OUTPATIENT
Start: 2023-04-20 | End: 2023-04-21

## 2023-04-20 RX ORDER — HYDRALAZINE HCL 50 MG
100 TABLET ORAL EVERY 8 HOURS
Refills: 0 | Status: DISCONTINUED | OUTPATIENT
Start: 2023-04-20 | End: 2023-04-26

## 2023-04-20 RX ORDER — ASCORBIC ACID 60 MG
500 TABLET,CHEWABLE ORAL DAILY
Refills: 0 | Status: DISCONTINUED | OUTPATIENT
Start: 2023-04-20 | End: 2023-04-26

## 2023-04-20 RX ORDER — ONDANSETRON 8 MG/1
4 TABLET, FILM COATED ORAL EVERY 8 HOURS
Refills: 0 | Status: DISCONTINUED | OUTPATIENT
Start: 2023-04-20 | End: 2023-04-26

## 2023-04-20 RX ORDER — ZOLPIDEM TARTRATE 10 MG/1
5 TABLET ORAL AT BEDTIME
Refills: 0 | Status: DISCONTINUED | OUTPATIENT
Start: 2023-04-20 | End: 2023-04-26

## 2023-04-20 RX ORDER — HEPARIN SODIUM 5000 [USP'U]/ML
5000 INJECTION INTRAVENOUS; SUBCUTANEOUS EVERY 12 HOURS
Refills: 0 | Status: DISCONTINUED | OUTPATIENT
Start: 2023-04-20 | End: 2023-04-20

## 2023-04-20 RX ORDER — COLLAGENASE CLOSTRIDIUM HIST. 250 UNIT/G
1 OINTMENT (GRAM) TOPICAL DAILY
Refills: 0 | Status: DISCONTINUED | OUTPATIENT
Start: 2023-04-20 | End: 2023-04-26

## 2023-04-20 RX ORDER — GABAPENTIN 400 MG/1
400 CAPSULE ORAL THREE TIMES A DAY
Refills: 0 | Status: DISCONTINUED | OUTPATIENT
Start: 2023-04-20 | End: 2023-04-26

## 2023-04-20 RX ORDER — SENNA PLUS 8.6 MG/1
2 TABLET ORAL AT BEDTIME
Refills: 0 | Status: DISCONTINUED | OUTPATIENT
Start: 2023-04-20 | End: 2023-04-26

## 2023-04-20 RX ORDER — ENOXAPARIN SODIUM 100 MG/ML
40 INJECTION SUBCUTANEOUS EVERY 24 HOURS
Refills: 0 | Status: DISCONTINUED | OUTPATIENT
Start: 2023-04-20 | End: 2023-04-26

## 2023-04-20 RX ORDER — NIFEDIPINE 30 MG
60 TABLET, EXTENDED RELEASE 24 HR ORAL DAILY
Refills: 0 | Status: DISCONTINUED | OUTPATIENT
Start: 2023-04-20 | End: 2023-04-20

## 2023-04-20 RX ORDER — CLONAZEPAM 1 MG
0.5 TABLET ORAL
Refills: 0 | Status: DISCONTINUED | OUTPATIENT
Start: 2023-04-20 | End: 2023-04-24

## 2023-04-20 RX ORDER — LANOLIN ALCOHOL/MO/W.PET/CERES
5 CREAM (GRAM) TOPICAL AT BEDTIME
Refills: 0 | Status: DISCONTINUED | OUTPATIENT
Start: 2023-04-20 | End: 2023-04-26

## 2023-04-20 RX ORDER — FUROSEMIDE 40 MG
40 TABLET ORAL DAILY
Refills: 0 | Status: DISCONTINUED | OUTPATIENT
Start: 2023-04-20 | End: 2023-04-26

## 2023-04-20 RX ORDER — ATORVASTATIN CALCIUM 80 MG/1
40 TABLET, FILM COATED ORAL AT BEDTIME
Refills: 0 | Status: DISCONTINUED | OUTPATIENT
Start: 2023-04-20 | End: 2023-04-24

## 2023-04-20 RX ORDER — ACETAMINOPHEN 500 MG
650 TABLET ORAL EVERY 6 HOURS
Refills: 0 | Status: DISCONTINUED | OUTPATIENT
Start: 2023-04-20 | End: 2023-04-21

## 2023-04-20 RX ORDER — MIRTAZAPINE 45 MG/1
7.5 TABLET, ORALLY DISINTEGRATING ORAL AT BEDTIME
Refills: 0 | Status: DISCONTINUED | OUTPATIENT
Start: 2023-04-20 | End: 2023-04-26

## 2023-04-20 RX ORDER — NIFEDIPINE 30 MG
30 TABLET, EXTENDED RELEASE 24 HR ORAL DAILY
Refills: 0 | Status: DISCONTINUED | OUTPATIENT
Start: 2023-04-20 | End: 2023-04-26

## 2023-04-20 RX ADMIN — GABAPENTIN 400 MILLIGRAM(S): 400 CAPSULE ORAL at 21:40

## 2023-04-20 RX ADMIN — OXYCODONE HYDROCHLORIDE 10 MILLIGRAM(S): 5 TABLET ORAL at 18:37

## 2023-04-20 RX ADMIN — GABAPENTIN 400 MILLIGRAM(S): 400 CAPSULE ORAL at 11:16

## 2023-04-20 RX ADMIN — Medication 650 MILLIGRAM(S): at 05:46

## 2023-04-20 RX ADMIN — Medication 100 MILLIGRAM(S): at 21:41

## 2023-04-20 RX ADMIN — OXYCODONE HYDROCHLORIDE 10 MILLIGRAM(S): 5 TABLET ORAL at 11:18

## 2023-04-20 RX ADMIN — Medication 100 MILLIGRAM(S): at 05:48

## 2023-04-20 RX ADMIN — Medication 650 MILLIGRAM(S): at 11:27

## 2023-04-20 RX ADMIN — OXYCODONE HYDROCHLORIDE 10 MILLIGRAM(S): 5 TABLET ORAL at 23:28

## 2023-04-20 RX ADMIN — Medication 650 MILLIGRAM(S): at 11:16

## 2023-04-20 RX ADMIN — OXYCODONE HYDROCHLORIDE 10 MILLIGRAM(S): 5 TABLET ORAL at 17:38

## 2023-04-20 RX ADMIN — OXYCODONE HYDROCHLORIDE 10 MILLIGRAM(S): 5 TABLET ORAL at 06:17

## 2023-04-20 RX ADMIN — Medication 500 MILLIGRAM(S): at 11:15

## 2023-04-20 RX ADMIN — OXYCODONE HYDROCHLORIDE 10 MILLIGRAM(S): 5 TABLET ORAL at 04:14

## 2023-04-20 RX ADMIN — Medication 81 MILLIGRAM(S): at 11:15

## 2023-04-20 RX ADMIN — MIRTAZAPINE 7.5 MILLIGRAM(S): 45 TABLET, ORALLY DISINTEGRATING ORAL at 21:41

## 2023-04-20 RX ADMIN — LOSARTAN POTASSIUM 100 MILLIGRAM(S): 100 TABLET, FILM COATED ORAL at 05:47

## 2023-04-20 RX ADMIN — OXYCODONE HYDROCHLORIDE 10 MILLIGRAM(S): 5 TABLET ORAL at 12:18

## 2023-04-20 RX ADMIN — ATORVASTATIN CALCIUM 40 MILLIGRAM(S): 80 TABLET, FILM COATED ORAL at 21:41

## 2023-04-20 RX ADMIN — CHLORHEXIDINE GLUCONATE 1 APPLICATION(S): 213 SOLUTION TOPICAL at 11:24

## 2023-04-20 RX ADMIN — GABAPENTIN 400 MILLIGRAM(S): 400 CAPSULE ORAL at 05:46

## 2023-04-20 RX ADMIN — ZOLPIDEM TARTRATE 5 MILLIGRAM(S): 10 TABLET ORAL at 22:50

## 2023-04-20 RX ADMIN — Medication 100 MILLIGRAM(S): at 11:16

## 2023-04-20 RX ADMIN — Medication 0.5 MILLIGRAM(S): at 08:57

## 2023-04-20 RX ADMIN — Medication 650 MILLIGRAM(S): at 06:18

## 2023-04-20 RX ADMIN — Medication 1 APPLICATION(S): at 11:15

## 2023-04-20 RX ADMIN — Medication 60 MILLIGRAM(S): at 05:48

## 2023-04-20 RX ADMIN — Medication 30 MILLIGRAM(S): at 21:42

## 2023-04-20 RX ADMIN — Medication 5 MILLIGRAM(S): at 21:49

## 2023-04-20 RX ADMIN — Medication 1 TABLET(S): at 11:15

## 2023-04-20 NOTE — PROGRESS NOTE ADULT - ASSESSMENT
77F w/ PMHx of R USAMA (c/b multiple infections, and revisions with Dr. Be) HTN, HLD c/o R hip pain for 1 month, concerning recurrance MRSA right hip chronic OM

## 2023-04-20 NOTE — PROGRESS NOTE ADULT - SUBJECTIVE AND OBJECTIVE BOX
Discussed patient with patient's daughter, Brittni ([phone]) on 4/18/23 at 1430.    Ms. Srivastava is a 77 year old female, past medical history of CAD, CHF, HTN, MRSA bacteremia, who presented to the [facility] Emergency Department with right hip pain. Patient has been experiencing right hip pain for about 2-3 weeks. 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 She then underwent a Right USAMA by Dr. Jaime in 2018, but was complicated by a fall and periprosthetic fracture in 2018. She underwent ORIF and revision USAMA by Dr. Be. 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 Be. Patient presented to [facility] about 3 weeks ago with right hip pain and stated that she was found to have MRSA bacteremia. Patient was treated with IV Vancomycin and a PICC line was placed. Per patient's daughter, a right hip aspiration showed a prosthetic joint infection. Patient subsequently presented to [facility] and was admitted to the medicine service. XRays showed a right total hip arthroplasty with significant heterotopic ossification. She underwent right hip aspiration, which only produced about 1 cc of serosanguinous fluid. Gram Stain was negative and cultures are no growth at this time.    Discussed with the patient's daughter that patient may have a prosthetic joint infection. Discussed following cultures and results from the aspiration. Discussed that due to her prior surgeries and remaining bone, her management is complex. Discussed due to her heterotopic ossification, history of infections, and remaining bone stock, operative management would consist of Girdlestone Resection Arthroplasty. Discussed that this would consist of removing the total hip arthroplasty without placing a spacer or any prosthesis. Discussed that patient would not have an articulating hip joint following the surgery. Discussed that it would be difficult to ambulate following Girdlestone Resection Arthroplasty, but may help with pain and infection control. Discussed that there is no guarantee of eradicating infection. Discussed the risks of operative management including, but not limited to, risks of anesthesia, heart attack, stroke, death, injuries to nerves and vessels, continued pain, recurrent or continued infection, difficulty with ambulation, fractures, and blood loss. Discussed that nonoperative management would consist of suppressive antibiotics. Discussed that this would allow the patient to keep her hip joint, but may not eradicate infection. Discussed the risks of nonoperative management including, but not limited to, continued pain, continued infection, worsening infection, and sepsis. Discussed that with or without surgery, patient would likely require lifelong antibiotics. Offered patient's daughter help with obtaining a second opinion. Patient's daughter discussed possible consultation with orthopedist at [facility]. Patient's daughter will think about the options. All questions were answered.    Plan:  -No acute orthopedic intervention at this time  -Pain Control  -Follow up Aspiration Cultures  -Follow up Infectious Disease  -Patient will likely require lifelong antibiotics

## 2023-04-20 NOTE — PROGRESS NOTE ADULT - ASSESSMENT
Assessment/Plan: 77y Female  s/p RLE diagnostic angiogram 4/19    - care per primary team  - reconsult prn    Vascular Surgery, [phone].

## 2023-04-20 NOTE — PROGRESS NOTE ADULT - SUBJECTIVE AND OBJECTIVE BOX
77yPatient is a 77y old  Female who presents with a chief complaint of Sent in from The [facility] for worsening RIGHT hip pain (20 Apr 2023 14:26)      Interval history:  Afebrile, + pain, unchanged.       Allergies:   No Known Allergies      Antimicrobials:      REVIEW OF SYSTEMS:  No chest pain   No SOB  No abdominal pain  No rash.       Vital Signs Last 24 Hrs  T(C): 36.8 (04-20-23 @ 12:30), Max: 37 (04-20-23 @ 03:58)  T(F): 98.3 (04-20-23 @ 12:30), Max: 98.6 (04-20-23 @ 03:58)  HR: 77 (04-20-23 @ 12:30) (61 - 79)  BP: 96/57 (04-20-23 @ 12:30) (90/43 - 115/48)  BP(mean): 75 (04-20-23 @ 00:00) (60 - 75)  RR: 18 (04-20-23 @ 12:30) (14 - 18)  SpO2: 98% (04-20-23 @ 12:30) (96% - 100%)      PHYSICAL EXAM:  Pt in no acute distress, alert, awake. sitting in chair   breathing comfortably on NC.   non distended abdomen  Rt LE edema with chronic skin changes and erythema.   no phlebitis                             10.0   6.66  )-----------( 183      ( 20 Apr 2023 05:37 )             32.4   04-20    137  |  100  |  50<H>  ----------------------------<  81  5.0   |  27  |  0.91    Ca    8.5      20 Apr 2023 05:35  Phos  5.5     04-20  Mg     2.2     04-20

## 2023-04-20 NOTE — PROGRESS NOTE ADULT - ASSESSMENT
76 y/o F--history of revision of a RIGHT USAMA in April 2019 with second revision of RIGHT hip Sept 2019, essential HTN, chronic anxiety disorder with a recent hospitalization at [facility] 3 weeks ago in the setting of apparent MRSA bacteremia with patient on IV vancomycin 750 mg Q12H with rifampin as a synergistic agent. Patient with severe pain despite oral Rx at [facility] and was sent to the ER.      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  MRSA infection of the hip last time which lead to surgery.       PLAN:   C/w vanco, level elevated, once level falls below 15 can administer 500 mg x 1 dose   plan to continue vanco by level.    no need for rifampin, especially if the concern is hip as a source, pt with chronic OM, the treatment for chronic OM is surgical debridement and not abx.   Risk of interaction with other meds with rifampin very high.   blood cx negative   Ortho on board  s/p IR guided aspiration of the rt hip joint. cell count not compatible with infection but pt on abx already and glucose very low suggestive of infection.   synovial fluid cx NTD.   discussed with pt's daughter, pt had aspiration of the hip performed at OSH which was positive for MRSA.   Ankle ulcer, s/p podiatry eval.   TTE with no vegetations.     reviewed records from [place], pt with + blood cx with MRSA on 3/17, started on abx 3/18, hip aspiration performed 3/22 was positive for MRSA.   planned for 8 weeks of therapy until 5/14/23.   the abx therapy in her case is not curable just suppressive.     Plan discussed with medicine attending.       Aurelio Montoya  Please contact through MS Teams   If no response or past 5 pm/weekend call [phone].       spent 25 mins reviewing the OSH records.

## 2023-04-20 NOTE — PROGRESS NOTE ADULT - ATTENDING COMMENTS
Patient seen and examined. Ms. Srivastava is a 77 year old female, past medical history of CAD, CHF, HTN, MRSA bacteremia, who presented to the [facility] Emergency Department with right hip pain. Patient has been experiencing right hip pain for about 2-3 weeks. 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 She then underwent a Right USAMA by Dr. Jaime in 2018, but was complicated by a fall and periprosthetic fracture in 2018. She underwent ORIF and revision USAMA by Dr. Be. 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 Be. Patient presented to [facility] about 3 weeks ago with right hip pain and stated that she was found to have MRSA bacteremia. Patient was treated with IV Vancomycin and a PICC line was placed. Patient states that she was also diagnosed with a right hip infection. No fevers or chills. No reported wound drainage.     Patient was admitted to the Medicine service. XRays showed a right total hip arthroplasty with significant heterotopic ossification. She underwent right hip aspiration, which only produced about 1 cc of serosanguinous fluid. Gram Stain was negative and cultures are no growth at this time.    Physical Exam:  Incision: well healed, no drainage  Tenderness over the left hip  Pain with ROM of the left hip  Motor: Intact EHL/FHL/Tibialis Anterior/Gastrocnemius  Sensory: Intact Superficial Peroneal/Deep Peroneal/Saphenous/Sural/Tibial Nerves  Vascular: Capillary Refill < 2 seconds    Assessment/Plan:  Ms. Srivastava is a 77 year old female with right hip pain, s/p revision USAMA in 2019, possible prosthetic joint infection. Discussed with the patient that she may have a prosthetic joint infection. Discussed following cultures and results from the aspiration. Discussed that due to her prior surgeries and remaining bone, her management is complex. Discussed due to her heterotopic ossification, history of infections, and remaining bone stock, operative management would consist of Girdlestone Resection Arthroplasty. Discussed that it would be difficult to ambulate following Girdlestone Resection Arthroplasty, but may help with pain and infection control. Discussed that nonoperative management would consist of suppressive antibiotics. Discussed that this would allow the patient to keep her hip joint, but may not eradicate potential infection. Discussed the risks of nonoperative management including, but not limited to, continued pain, continued infection, and worsening infection. Patient would like nonoperative management at this time. All questions answered.    Plan:  -No acute orthopedic intervention at this time  -Pain Control  -Follow up Aspiration Cultures  -Follow up Infectious Disease
.
Seen at bedside. Wound stable.  continue to offload.  local wound care.
revasc would require retrograde pedal access, v open bypass  I do not think open bypass is a good option given chronic hip infection  can consider retro pedal, but for now will monitor wound progression w local wound care

## 2023-04-20 NOTE — PROGRESS NOTE ADULT - PROBLEM SELECTOR PLAN 1
s/p THR (c/b multiple infections and revisions) w/ R hip pain for 1 month. 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 MRSA infection of the hip last time which lead to surgery.    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 Changes may be related to loosening or infection in the appropriate setting.    - Medical records request to [facility] sent 4/18 and 4/20  - IR aspiration of R hip: synovial fluid analysis negative. Cell count, more neutrophilic predominant.   - C/w Vancomycin, renally dose. F/u with Vanc level in am.   - Pain management: Reconsult pain: 10mg oxycodone q6hr for severe pain. tylenol 650mg q6hr standing  - BCLX NGTD   - WBAT, PT/OT as tolerated   - Ortho following, no surgical intervention as patient's main priorty is to walk. Goal of surgery would be to control infection and hip pain but patient not interested. Daughter will seek out second opinion.   - Podiatry following, no acute intervention from podiatry  - ID following

## 2023-04-20 NOTE — PROGRESS NOTE ADULT - NSPROGADDITIONALINFOA_GEN_ALL_CORE
Emailed request to [facility] for medical records. Check vancomycin level tomorrow. Likely will restart IV vanco. Dispo planning    Alyson Mota MD  Division of Hospital Medicine  Available on Microsoft Teams

## 2023-04-20 NOTE — PROGRESS NOTE ADULT - SUBJECTIVE AND OBJECTIVE BOX
TEAM Surgery Progress Note  Patient is a 77y old  Female who presents with a chief complaint of Sent in from The [facility] for worsening RIGHT hip pain (19 Apr 2023 16:49)        OBJECTIVE:    Vital Signs Last 24 Hrs  T(C): 36.7 (20 Apr 2023 04:19), Max: 37 (20 Apr 2023 03:58)  T(F): 98.1 (20 Apr 2023 04:19), Max: 98.6 (20 Apr 2023 03:58)  HR: 77 (20 Apr 2023 05:45) (61 - 79)  BP: 107/58 (20 Apr 2023 05:45) (90/43 - 115/48)  BP(mean): 75 (20 Apr 2023 00:00) (60 - 75)  RR: 18 (20 Apr 2023 04:19) (14 - 18)  SpO2: 96% (20 Apr 2023 04:19) (94% - 100%)    Parameters below as of 20 Apr 2023 04:19  Patient On (Oxygen Delivery Method): nasal cannula  O2 Flow (L/min): 2  I&O's Detail    19 Apr 2023 07:01  -  20 Apr 2023 07:00  --------------------------------------------------------  IN:    Oral Fluid: 480 mL  Total IN: 480 mL    OUT:    Voided (mL): 600 mL  Total OUT: 600 mL    Total NET: -120 mL      MEDICATIONS  (STANDING):  acetaminophen     Tablet .. 650 milliGRAM(s) Oral every 6 hours  ascorbic acid 500 milliGRAM(s) Oral daily  aspirin  chewable 81 milliGRAM(s) Oral daily  atorvastatin 40 milliGRAM(s) Oral at bedtime  collagenase Ointment 1 Application(s) Topical daily  furosemide    Tablet 40 milliGRAM(s) Oral daily  gabapentin 400 milliGRAM(s) Oral three times a day  heparin   Injectable 5000 Unit(s) SubCutaneous every 12 hours  hydrALAZINE 100 milliGRAM(s) Oral every 8 hours  lidocaine   4% Patch 1 Patch Transdermal daily  losartan 100 milliGRAM(s) Oral daily  melatonin 5 milliGRAM(s) Oral at bedtime  mirtazapine 7.5 milliGRAM(s) Oral at bedtime  multivitamin 1 Tablet(s) Oral daily  NIFEdipine XL 60 milliGRAM(s) Oral daily  polyethylene glycol 3350 17 Gram(s) Oral two times a day  senna 2 Tablet(s) Oral at bedtime    MEDICATIONS  (PRN):  clonazePAM  Tablet 0.5 milliGRAM(s) Oral two times a day PRN for anxiety  oxyCODONE    IR 10 milliGRAM(s) Oral every 6 hours PRN Severe Pain (7 - 10)  zolpidem 5 milliGRAM(s) Oral at bedtime PRN Insomnia      PHYSICAL EXAM:  Constitutional: A&Ox3, NAD  Respiratory: Unlabored breathing  Groin: L groin soft, compressible. Dressing c/d/i  Extremities: Palpable R PT, aquacel dressing on heel. Palpable L DP & PT    LABS:                        10.0   6.66  )-----------( 183      ( 20 Apr 2023 05:37 )             32.4     04-20    137  |  100  |  50<H>  ----------------------------<  81  5.0   |  27  |  0.91    Ca    8.5      20 Apr 2023 05:35  Phos  5.5     04-20  Mg     2.2     04-20      PT/INR - ( 19 Apr 2023 04:12 )   PT: 12.8 sec;   INR: 1.11 ratio         PTT - ( 19 Apr 2023 04:12 )  PTT:31.9 sec          IMAGING:

## 2023-04-20 NOTE — PROGRESS NOTE ADULT - SUBJECTIVE AND OBJECTIVE BOX
DATE OF SERVICE: 04-20-23 @ 13:16    Patient is a 77y old  Female who presents with a chief complaint of Sent in from [facility] for worsening RIGHT hip pain (20 Apr 2023 08:03)      INTERVAL HISTORY: Feels ok.     REVIEW OF SYSTEMS:  CONSTITUTIONAL: No weakness  EYES/ENT: No visual changes;  No throat pain   NECK: No pain or stiffness  RESPIRATORY: No cough, wheezing; No shortness of breath  CARDIOVASCULAR: No chest pain or palpitations  GASTROINTESTINAL: No abdominal  pain. No nausea, vomiting, or hematemesis  GENITOURINARY: No dysuria, frequency or hematuria  NEUROLOGICAL: No stroke like symptoms  SKIN: No rashes    TELEMETRY Personally reviewed:  70-90  	  MEDICATIONS:  furosemide    Tablet 40 milliGRAM(s) Oral daily  hydrALAZINE 100 milliGRAM(s) Oral every 8 hours  losartan 100 milliGRAM(s) Oral daily  NIFEdipine XL 60 milliGRAM(s) Oral daily        PHYSICAL EXAM:  T(C): 36.7 (04-20-23 @ 04:19), Max: 37 (04-20-23 @ 03:58)  HR: 77 (04-20-23 @ 05:45) (61 - 79)  BP: 107/58 (04-20-23 @ 05:45) (90/43 - 115/48)  RR: 18 (04-20-23 @ 04:19) (14 - 18)  SpO2: 96% (04-20-23 @ 04:19) (96% - 100%)  Wt(kg): --  I&O's Summary    19 Apr 2023 07:01  -  20 Apr 2023 07:00  --------------------------------------------------------  IN: 480 mL / OUT: 600 mL / NET: -120 mL    20 Apr 2023 07:01  -  20 Apr 2023 13:16  --------------------------------------------------------  IN: 0 mL / OUT: 1000 mL / NET: -1000 mL      Height (cm): 160 (04-19 @ 17:05)  Weight (kg): 49.9 (04-19 @ 17:05)  BMI (kg/m2): 19.5 (04-19 @ 17:05)  BSA (m2): 1.5 (04-19 @ 17:05)    Appearance: In no distress	  HEENT:    PERRL, EOMI	  Cardiovascular:  S1 S2, No JVD  Respiratory: Lungs clear to auscultation	  Gastrointestinal:  Soft, Non-tender, + BS	  Vascularature:  No edema of LE  Psychiatric: Appropriate affect   Neuro: no acute focal deficits                               10.0   6.66  )-----------( 183      ( 20 Apr 2023 05:37 )             32.4     04-20    137  |  100  |  50<H>  ----------------------------<  81  5.0   |  27  |  0.91    Ca    8.5      20 Apr 2023 05:35  Phos  5.5     04-20  Mg     2.2     04-20          Labs personally reviewed      ASSESSMENT/PLAN: 	        76 y/o F--history of revision of a RIGHT USAMA in April 2019 with second revision of RIGHT hip Sept 2019, HTN, chronic anxiety disorder, recent hospitalization at [facility] 3 weeks ago for MRSA bacteremia with patient on IV vancomycin 750 mg Q12H with rifampin as a synergistic agent. Patient denies fever, chills, rigors.  No chest pain/pressure.  NO palpitations.   Patient denies dyspnoea but with poor exercise capacity.     1. MRSA bacteremia  - Blood Cx 4/11 NGTD  - Afebrile, no leukocytosis  - TRUDY if Cultures repeat + and if ID recommends  - TTE shows preserved EF, no evidence of vegetations    2. Chronic Diastolic Heart Failure  - CXR shows small left pleural effusion  - BNP 3206  - TTE shows preserved EF, no WMA, moderate AS  - c/w Lasix 40mg PO daily  - Apears euvolemic    3. Hypertension  - c/w losartan 100mg daily  - c/w hydralazine 100mg PO TID  - BP soft. Decrease Nifedipine to 30mg PO daily    4. DVT ppx  - c/w SQ Heparin    5. Cardiac Risk Stratification  - ECG NSR with no ischemia noted  - TTE shows preserved EF, no WMA  - Patient not in decompensated HF  - No hx of tachy meme arrhythmias  - No hx of severe MS/AS  - Patient is low-mod risk for low risk peripheral angiogram. No contraindication to proceed.  - Tolerated RLE angio well.           SERVANDO Wilson-CATY Christianson DO [facility]  Cardiovascular Medicine  [address] [place] [address], [address]  Available through call or text on [employer] TEAMs  Office: [phone]

## 2023-04-20 NOTE — PROGRESS NOTE ADULT - SUBJECTIVE AND OBJECTIVE BOX
Patient is a 77y old  Female who presents with a chief complaint of Sent in from [facility] for worsening RIGHT hip pain (20 Apr 2023 13:16)        SUBJECTIVE / OVERNIGHT EVENTS: Patient had no acute events overnight. Patient seen and examined at bedside this morning. Appears comfortable in the room but when asked about pain, continues to have 9-10/10 pain. Reports tingling sensation in RLE.     ROS:    MEDICATIONS  (STANDING):  acetaminophen     Tablet .. 650 milliGRAM(s) Oral every 6 hours  ascorbic acid 500 milliGRAM(s) Oral daily  aspirin  chewable 81 milliGRAM(s) Oral daily  atorvastatin 40 milliGRAM(s) Oral at bedtime  chlorhexidine 2% Cloths 1 Application(s) Topical daily  collagenase Ointment 1 Application(s) Topical daily  furosemide    Tablet 40 milliGRAM(s) Oral daily  gabapentin 400 milliGRAM(s) Oral three times a day  heparin   Injectable 5000 Unit(s) SubCutaneous every 12 hours  hydrALAZINE 100 milliGRAM(s) Oral every 8 hours  lidocaine   4% Patch 1 Patch Transdermal daily  losartan 100 milliGRAM(s) Oral daily  melatonin 5 milliGRAM(s) Oral at bedtime  mirtazapine 7.5 milliGRAM(s) Oral at bedtime  multivitamin 1 Tablet(s) Oral daily  NIFEdipine XL 30 milliGRAM(s) Oral daily  polyethylene glycol 3350 17 Gram(s) Oral two times a day  senna 2 Tablet(s) Oral at bedtime    MEDICATIONS  (PRN):  clonazePAM  Tablet 0.5 milliGRAM(s) Oral two times a day PRN for anxiety  oxyCODONE    IR 10 milliGRAM(s) Oral every 6 hours PRN Severe Pain (7 - 10)  zolpidem 5 milliGRAM(s) Oral at bedtime PRN Insomnia      Vital Signs Last 24 Hrs  T(C): 36.8 (20 Apr 2023 12:30), Max: 37 (20 Apr 2023 03:58)  T(F): 98.3 (20 Apr 2023 12:30), Max: 98.6 (20 Apr 2023 03:58)  HR: 77 (20 Apr 2023 12:30) (61 - 79)  BP: 96/57 (20 Apr 2023 12:30) (90/43 - 115/48)  BP(mean): 75 (20 Apr 2023 00:00) (60 - 75)  RR: 18 (20 Apr 2023 12:30) (14 - 18)  SpO2: 98% (20 Apr 2023 12:30) (96% - 100%)    Parameters below as of 20 Apr 2023 12:30  Patient On (Oxygen Delivery Method): nasal cannula  O2 Flow (L/min): 2    CAPILLARY BLOOD GLUCOSE        I&O's Summary    19 Apr 2023 07:01  -  20 Apr 2023 07:00  --------------------------------------------------------  IN: 480 mL / OUT: 600 mL / NET: -120 mL    20 Apr 2023 07:01  -  20 Apr 2023 14:27  --------------------------------------------------------  IN: 120 mL / OUT: 1000 mL / NET: -880 mL        PHYSICAL EXAM  GENERAL: NAD, lying comfortably in bed having breakfast, frail  HEENT:  Atraumatic, Normocephalic, EOMI, conjunctiva and sclera clear  CHEST/LUNG: Clear to auscultation bilaterally; No wheeze  HEART: RRR, S1 and S2 No murmurs, rubs, or gallops  ABDOMEN: Soft, Nontender, Nondistended; Bowel sounds present  EXTREMITIES:  pedal pulses +, right above knee and hip nontender  NEURO: AAOx2, non-focal, conversant but repeats questions, forgetful of recent conversations  SKIN: No rashes or lesions    LABS:                        10.0   6.66  )-----------( 183      ( 20 Apr 2023 05:37 )             32.4     04-20    137  |  100  |  50<H>  ----------------------------<  81  5.0   |  27  |  0.91    Ca    8.5      20 Apr 2023 05:35  Phos  5.5     04-20  Mg     2.2     04-20      PT/INR - ( 19 Apr 2023 04:12 )   PT: 12.8 sec;   INR: 1.11 ratio         PTT - ( 19 Apr 2023 04:12 )  PTT:31.9 sec            RADIOLOGY & ADDITIONAL TESTS:      Labs Personally Reviewed  Imaging Personally Reviewed  Consultant(s) Notes Reviewed

## 2023-04-20 NOTE — PROGRESS NOTE ADULT - PROBLEM SELECTOR PLAN 2
KARLEE CRONINTKASHIF. Has PICC line from [facility]  - recheck vano level daily. Goal level 15-20. Vancomycin 750mg daily on hold  - TTE negative for vegetations  - spoke with daughter 4/17 and 4/19 to obtain OSH records. Faxed request to OSH for medical records and emailed. Need blood culture clearance, and sensitivties

## 2023-04-21 LAB
ALBUMIN SERPL ELPH-MCNC: 3.7 G/DL — SIGNIFICANT CHANGE UP (ref 3.3–5)
ALP SERPL-CCNC: 80 U/L — SIGNIFICANT CHANGE UP (ref 40–120)
ALT FLD-CCNC: 9 U/L — LOW (ref 10–45)
ANION GAP SERPL CALC-SCNC: 9 MMOL/L — SIGNIFICANT CHANGE UP (ref 5–17)
AST SERPL-CCNC: 17 U/L — SIGNIFICANT CHANGE UP (ref 10–40)
BASOPHILS # BLD AUTO: 0.03 K/UL — SIGNIFICANT CHANGE UP (ref 0–0.2)
BASOPHILS NFR BLD AUTO: 0.6 % — SIGNIFICANT CHANGE UP (ref 0–2)
BILIRUB SERPL-MCNC: 0.3 MG/DL — SIGNIFICANT CHANGE UP (ref 0.2–1.2)
BUN SERPL-MCNC: 45 MG/DL — HIGH (ref 7–23)
CALCIUM SERPL-MCNC: 9.2 MG/DL — SIGNIFICANT CHANGE UP (ref 8.4–10.5)
CHLORIDE SERPL-SCNC: 99 MMOL/L — SIGNIFICANT CHANGE UP (ref 96–108)
CO2 SERPL-SCNC: 30 MMOL/L — SIGNIFICANT CHANGE UP (ref 22–31)
CREAT SERPL-MCNC: 0.82 MG/DL — SIGNIFICANT CHANGE UP (ref 0.5–1.3)
EGFR: 75 ML/MIN/1.73M2 — SIGNIFICANT CHANGE UP
EOSINOPHIL # BLD AUTO: 0.08 K/UL — SIGNIFICANT CHANGE UP (ref 0–0.5)
EOSINOPHIL NFR BLD AUTO: 1.5 % — SIGNIFICANT CHANGE UP (ref 0–6)
GLUCOSE SERPL-MCNC: 147 MG/DL — HIGH (ref 70–99)
HCT VFR BLD CALC: 33.4 % — LOW (ref 34.5–45)
HGB BLD-MCNC: 10.5 G/DL — LOW (ref 11.5–15.5)
IMM GRANULOCYTES NFR BLD AUTO: 0.7 % — SIGNIFICANT CHANGE UP (ref 0–0.9)
LYMPHOCYTES # BLD AUTO: 0.44 K/UL — LOW (ref 1–3.3)
LYMPHOCYTES # BLD AUTO: 8.2 % — LOW (ref 13–44)
MCHC RBC-ENTMCNC: 31.4 GM/DL — LOW (ref 32–36)
MCHC RBC-ENTMCNC: 31.8 PG — SIGNIFICANT CHANGE UP (ref 27–34)
MCV RBC AUTO: 101.2 FL — HIGH (ref 80–100)
MONOCYTES # BLD AUTO: 0.45 K/UL — SIGNIFICANT CHANGE UP (ref 0–0.9)
MONOCYTES NFR BLD AUTO: 8.4 % — SIGNIFICANT CHANGE UP (ref 2–14)
NEUTROPHILS # BLD AUTO: 4.33 K/UL — SIGNIFICANT CHANGE UP (ref 1.8–7.4)
NEUTROPHILS NFR BLD AUTO: 80.6 % — HIGH (ref 43–77)
NRBC # BLD: 0 /100 WBCS — SIGNIFICANT CHANGE UP (ref 0–0)
PLATELET # BLD AUTO: 189 K/UL — SIGNIFICANT CHANGE UP (ref 150–400)
POTASSIUM SERPL-MCNC: 4.5 MMOL/L — SIGNIFICANT CHANGE UP (ref 3.5–5.3)
POTASSIUM SERPL-SCNC: 4.5 MMOL/L — SIGNIFICANT CHANGE UP (ref 3.5–5.3)
PROT SERPL-MCNC: 8.3 G/DL — SIGNIFICANT CHANGE UP (ref 6–8.3)
RBC # BLD: 3.3 M/UL — LOW (ref 3.8–5.2)
RBC # FLD: 14.6 % — HIGH (ref 10.3–14.5)
SODIUM SERPL-SCNC: 138 MMOL/L — SIGNIFICANT CHANGE UP (ref 135–145)
VANCOMYCIN FLD-MCNC: 16.3 UG/ML — SIGNIFICANT CHANGE UP
WBC # BLD: 5.37 K/UL — SIGNIFICANT CHANGE UP (ref 3.8–10.5)
WBC # FLD AUTO: 5.37 K/UL — SIGNIFICANT CHANGE UP (ref 3.8–10.5)

## 2023-04-21 PROCEDURE — 99232 SBSQ HOSP IP/OBS MODERATE 35: CPT

## 2023-04-21 RX ORDER — OXYCODONE AND ACETAMINOPHEN 5; 325 MG/1; MG/1
2 TABLET ORAL EVERY 4 HOURS
Refills: 0 | Status: DISCONTINUED | OUTPATIENT
Start: 2023-04-21 | End: 2023-04-22

## 2023-04-21 RX ADMIN — OXYCODONE AND ACETAMINOPHEN 2 TABLET(S): 5; 325 TABLET ORAL at 18:30

## 2023-04-21 RX ADMIN — ATORVASTATIN CALCIUM 40 MILLIGRAM(S): 80 TABLET, FILM COATED ORAL at 21:42

## 2023-04-21 RX ADMIN — Medication 650 MILLIGRAM(S): at 05:10

## 2023-04-21 RX ADMIN — Medication 100 MILLIGRAM(S): at 21:42

## 2023-04-21 RX ADMIN — Medication 5 MILLIGRAM(S): at 21:43

## 2023-04-21 RX ADMIN — Medication 81 MILLIGRAM(S): at 13:31

## 2023-04-21 RX ADMIN — LOSARTAN POTASSIUM 100 MILLIGRAM(S): 100 TABLET, FILM COATED ORAL at 05:08

## 2023-04-21 RX ADMIN — Medication 1 APPLICATION(S): at 14:03

## 2023-04-21 RX ADMIN — GABAPENTIN 400 MILLIGRAM(S): 400 CAPSULE ORAL at 05:08

## 2023-04-21 RX ADMIN — GABAPENTIN 400 MILLIGRAM(S): 400 CAPSULE ORAL at 13:32

## 2023-04-21 RX ADMIN — OXYCODONE HYDROCHLORIDE 10 MILLIGRAM(S): 5 TABLET ORAL at 08:24

## 2023-04-21 RX ADMIN — Medication 100 MILLIGRAM(S): at 05:08

## 2023-04-21 RX ADMIN — ZOLPIDEM TARTRATE 5 MILLIGRAM(S): 10 TABLET ORAL at 21:42

## 2023-04-21 RX ADMIN — GABAPENTIN 400 MILLIGRAM(S): 400 CAPSULE ORAL at 21:42

## 2023-04-21 RX ADMIN — OXYCODONE AND ACETAMINOPHEN 2 TABLET(S): 5; 325 TABLET ORAL at 21:41

## 2023-04-21 RX ADMIN — Medication 30 MILLIGRAM(S): at 05:08

## 2023-04-21 RX ADMIN — CHLORHEXIDINE GLUCONATE 1 APPLICATION(S): 213 SOLUTION TOPICAL at 13:32

## 2023-04-21 RX ADMIN — OXYCODONE HYDROCHLORIDE 10 MILLIGRAM(S): 5 TABLET ORAL at 09:30

## 2023-04-21 RX ADMIN — Medication 100 MILLIGRAM(S): at 13:33

## 2023-04-21 RX ADMIN — OXYCODONE AND ACETAMINOPHEN 2 TABLET(S): 5; 325 TABLET ORAL at 17:05

## 2023-04-21 NOTE — PROVIDER CONTACT NOTE (OTHER) - REASON
Pt refusal of treatment
Patient refusal of medications.
Suicidal ideation noted as per pt comment to "kill herself "
vanco level 41.6, drawn after vanco was given

## 2023-04-21 NOTE — PROGRESS NOTE ADULT - SUBJECTIVE AND OBJECTIVE BOX
Patient is a 77y old  Female who presents with a chief complaint of Sent in from [facility] for worsening RIGHT hip pain (21 Apr 2023 13:21)        SUBJECTIVE / OVERNIGHT EVENTS: Patient had no acute events overnight. Patient seen and examined at bedside this morning. Reports she cannot wear weight on right LE due to right hip pain. Per nursing, patient was reporting SI earlier in the morning. Patient denies that she intends to harm herself or others but said that due to her pain.     ROS:     MEDICATIONS  (STANDING):  ascorbic acid 500 milliGRAM(s) Oral daily  aspirin  chewable 81 milliGRAM(s) Oral daily  atorvastatin 40 milliGRAM(s) Oral at bedtime  chlorhexidine 2% Cloths 1 Application(s) Topical daily  collagenase Ointment 1 Application(s) Topical daily  enoxaparin Injectable 40 milliGRAM(s) SubCutaneous every 24 hours  furosemide    Tablet 40 milliGRAM(s) Oral daily  gabapentin 400 milliGRAM(s) Oral three times a day  hydrALAZINE 100 milliGRAM(s) Oral every 8 hours  lidocaine   4% Patch 1 Patch Transdermal daily  losartan 100 milliGRAM(s) Oral daily  melatonin 5 milliGRAM(s) Oral at bedtime  mirtazapine 7.5 milliGRAM(s) Oral at bedtime  multivitamin 1 Tablet(s) Oral daily  NIFEdipine XL 30 milliGRAM(s) Oral daily  polyethylene glycol 3350 17 Gram(s) Oral two times a day  senna 2 Tablet(s) Oral at bedtime    MEDICATIONS  (PRN):  aluminum hydroxide/magnesium hydroxide/simethicone Suspension 30 milliLiter(s) Oral every 4 hours PRN Dyspepsia  clonazePAM  Tablet 0.5 milliGRAM(s) Oral two times a day PRN for anxiety  ondansetron Injectable 4 milliGRAM(s) IV Push every 8 hours PRN Nausea and/or Vomiting  oxycodone    5 mG/acetaminophen 325 mG 2 Tablet(s) Oral every 4 hours PRN Severe Pain (7 - 10)  zolpidem 5 milliGRAM(s) Oral at bedtime PRN Insomnia      Vital Signs Last 24 Hrs  T(C): 36.8 (21 Apr 2023 13:34), Max: 36.8 (21 Apr 2023 04:01)  T(F): 98.2 (21 Apr 2023 13:34), Max: 98.2 (21 Apr 2023 04:01)  HR: 84 (21 Apr 2023 16:59) (72 - 84)  BP: 162/68 (21 Apr 2023 16:59) (130/54 - 162/68)  BP(mean): --  RR: 17 (21 Apr 2023 16:59) (17 - 917)  SpO2: 92% (21 Apr 2023 16:59) (90% - 97%)    Parameters below as of 21 Apr 2023 16:59  Patient On (Oxygen Delivery Method): nasal cannula  O2 Flow (L/min): 2    CAPILLARY BLOOD GLUCOSE        I&O's Summary    20 Apr 2023 07:01  -  21 Apr 2023 07:00  --------------------------------------------------------  IN: 120 mL / OUT: 2250 mL / NET: -2130 mL    21 Apr 2023 07:01  -  21 Apr 2023 17:14  --------------------------------------------------------  IN: 360 mL / OUT: 0 mL / NET: 360 mL        PHYSICAL EXAM  GENERAL: NAD, sitting in chair. Patient ambulates with walker but not bearing weight on RLE due to pain   HEENT:  Atraumatic, Normocephalic, EOMI, conjunctiva and sclera clear  CHEST/LUNG: Clear to auscultation bilaterally; No wheeze  HEART: RRR, S1 and S2 No murmurs, rubs, or gallops  ABDOMEN: Soft, Nontender, Nondistended; Bowel sounds present  EXTREMITIES:  pedal pulses +, right above knee and hip nontender  NEURO: AAOx2, non-focal, conversant but repeats questions, forgetful of recent conversations  SKIN: No rashes or lesions    LABS:                        10.5   5.37  )-----------( 189      ( 21 Apr 2023 09:53 )             33.4     04-21    138  |  99  |  45<H>  ----------------------------<  147<H>  4.5   |  30  |  0.82    Ca    9.2      21 Apr 2023 09:53  Phos  5.5     04-20  Mg     2.2     04-20    TPro  8.3  /  Alb  3.7  /  TBili  0.3  /  DBili  x   /  AST  17  /  ALT  9<L>  /  AlkPhos  80  04-21                RADIOLOGY & ADDITIONAL TESTS:      Labs Personally Reviewed  Imaging Personally Reviewed  Consultant(s) Notes Reviewed

## 2023-04-21 NOTE — PROGRESS NOTE ADULT - PROBLEM SELECTOR PLAN 2
KARLEE MODI. Has PICC line from [facility]  - recheck vano level daily. Goal level 15-20. Redose vancomcyin per level  - TTE negative for vegetations  -ID recs

## 2023-04-21 NOTE — BH CONSULTATION LIAISON PROGRESS NOTE - NSBHMSEAFFQUAL_PSY_A_CORE
"Requested Prescriptions   Pending Prescriptions Disp Refills     NOVOLOG VIAL 100 UNIT/ML soln [Pharmacy Med Name: NOVOLOG 100U/ML SOLN FOR INJ]  Last Written Prescription Date:  10/25/18  Last Fill Quantity: 30mL,  # refills: 0   Last office visit: 7/23/2018 with prescribing provider:  Gisella   Future Office Visit:     30 mL      Sig: INJECT 80 UNITS UNDER THE SKIN DAILY PER INSULIN PUMP AND SLIDING SCALE AS NEEDED    Short Acting Insulin Protocol Passed    11/26/2018 10:16 AM       Passed - Blood pressure less than 140/90 in past 6 months    BP Readings from Last 3 Encounters:   07/23/18 100/64   02/26/18 122/82   08/03/17 118/60                Passed - LDL on file in past 12 months    Recent Labs   Lab Test  07/23/18   1358   LDL  101*            Passed - Microalbumin on file in past 12 months    Recent Labs   Lab Test  07/23/18   1415   MICROL  10   UMALCR  3.83            Passed - Serum creatinine on file in past 12 months    Recent Labs   Lab Test  07/23/18   1358   CR  1.11            Passed - HgbA1C in past 3 or 6 months    If HgbA1C is 8 or greater, it needs to be on file within the past 3 months.  If less than 8, must be on file within the past 6 months.     Recent Labs   Lab Test  11/12/18   0825   A1C  8.4*            Passed - Patient is age 18 or older       Passed - Recent (6 mo) or future (30 days) visit within the authorizing provider's specialty    Patient had office visit in the last 6 months or has a visit in the next 30 days with authorizing provider or within the authorizing provider's specialty.  See \"Patient Info\" tab in inbasket, or \"Choose Columns\" in Meds & Orders section of the refill encounter.              "
Routing refill request to provider for review/approval because:  Labs out of range:  LDL    YVONNE TangN, RN  [facility]      
Euthymic/Irritable

## 2023-04-21 NOTE — BH CONSULTATION LIAISON PROGRESS NOTE - NSBHCHARTREVIEWVS_PSY_A_CORE FT
Vital Signs Last 24 Hrs  T(C): 36.4 (21 Apr 2023 08:21), Max: 36.8 (20 Apr 2023 12:30)  T(F): 97.5 (21 Apr 2023 08:21), Max: 98.3 (20 Apr 2023 12:30)  HR: 72 (21 Apr 2023 08:21) (72 - 83)  BP: 158/67 (21 Apr 2023 08:21) (96/57 - 158/67)  BP(mean): --  RR: 18 (21 Apr 2023 08:21) (18 - 18)  SpO2: 91% (21 Apr 2023 08:21) (91% - 98%)    Parameters below as of 21 Apr 2023 08:21  Patient On (Oxygen Delivery Method): nasal cannula  O2 Flow (L/min): 2

## 2023-04-21 NOTE — BH CONSULTATION LIAISON PROGRESS NOTE - NSBHCONSULTFOLLOWAFTERCARE_PSY_A_CORE FT
Patient can continue with primary provider after discharge. Alternately, she can be referred to [facility] [phone].

## 2023-04-21 NOTE — PROGRESS NOTE ADULT - NSPROGADDITIONALINFOA_GEN_ALL_CORE
Pending vancomcyin level <15 to redose abx. Medical records obtained. Pain control. Psych consulted for SI.  Discussed with CM- likely dispo early next week     d/w ACP    Alyson Mota MD  Division of Hospital Medicine  Available on Microsoft Teams

## 2023-04-21 NOTE — BH CONSULTATION LIAISON PROGRESS NOTE - CURRENT MEDICATION
MEDICATIONS  (STANDING):  ascorbic acid 500 milliGRAM(s) Oral daily  aspirin  chewable 81 milliGRAM(s) Oral daily  atorvastatin 40 milliGRAM(s) Oral at bedtime  chlorhexidine 2% Cloths 1 Application(s) Topical daily  collagenase Ointment 1 Application(s) Topical daily  enoxaparin Injectable 40 milliGRAM(s) SubCutaneous every 24 hours  furosemide    Tablet 40 milliGRAM(s) Oral daily  gabapentin 400 milliGRAM(s) Oral three times a day  hydrALAZINE 100 milliGRAM(s) Oral every 8 hours  lidocaine   4% Patch 1 Patch Transdermal daily  losartan 100 milliGRAM(s) Oral daily  melatonin 5 milliGRAM(s) Oral at bedtime  mirtazapine 7.5 milliGRAM(s) Oral at bedtime  multivitamin 1 Tablet(s) Oral daily  NIFEdipine XL 30 milliGRAM(s) Oral daily  polyethylene glycol 3350 17 Gram(s) Oral two times a day  senna 2 Tablet(s) Oral at bedtime    MEDICATIONS  (PRN):  aluminum hydroxide/magnesium hydroxide/simethicone Suspension 30 milliLiter(s) Oral every 4 hours PRN Dyspepsia  clonazePAM  Tablet 0.5 milliGRAM(s) Oral two times a day PRN for anxiety  ondansetron Injectable 4 milliGRAM(s) IV Push every 8 hours PRN Nausea and/or Vomiting  oxycodone    5 mG/acetaminophen 325 mG 10 Tablet(s) Oral every 4 hours PRN Severe Pain (7 - 10)  zolpidem 5 milliGRAM(s) Oral at bedtime PRN Insomnia

## 2023-04-21 NOTE — BH CONSULTATION LIAISON PROGRESS NOTE - NSBHFUPINTERVALHXFT_PSY_A_CORE
At bedside this morning patient stated "I'm going to kill myself". Upon interview, patient states she made the statement in frustration related to her ongoing issues with her hip including persistent pain and increasing anxiety since her last assessment. She endorsed feeling "down" and "frustrated" and related her mood to inability to perform activities she would normally have been able to complete. She denies anhedonia, feelings of guilt or hopelessness, and any recent changes in sleep or appetite. She denies any symptoms of brody/hypomania. She denies any V/A/T hallucinations. She is A&Ox3. She denies access to firearms. She states she "needs a sedative" and "I'll say I'm going to kill myself if it gets me a sedative". Through the interview the patient became increasingly frustrated with answering questions and stated "I'm going to kill myself once I get out of here" but again stated this was said in frustration and denied any passive or active SIIP/HIIP or thoughts of self-harm. Attempt to contact daughter for collateral was made at approximately 11:15am but there was no answer. At bedside this morning patient stated "I'm going to kill myself". Upon interview, patient states she made the statement in frustration related to her ongoing issues with her hip including persistent pain and increasing anxiety since her last assessment. She endorsed feeling "down" and "frustrated" and related her mood to inability to perform activities she would normally have been able to complete. She denies anhedonia, feelings of guilt or hopelessness, and any recent changes in sleep or appetite. She denies any symptoms of brody/hypomania. She denies any V/A/T hallucinations. She is A&Ox3. She denies access to firearms. She states she "needs a sedative" and "I said I'm going to kill myself if it gets me a sedative". Through the interview the patient became increasingly frustrated with answering questions and stated but again stated this was said in frustration and denied any passive or active SIIP/HIIP or thoughts of self-harm. Attempt to contact daughter for collateral was made at approximately 11:15am but there was no answer.

## 2023-04-21 NOTE — PROGRESS NOTE ADULT - PROBLEM SELECTOR PLAN 1
s/p THR (c/b multiple infections and revisions) w/ R hip pain for 1 month. 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 MRSA infection of the hip last time which lead to surgery.    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 Changes may be related to loosening or infection in the appropriate setting.    - IR aspiration of R hip: synovial fluid analysis negative. Cell count, more neutrophilic predominant.   - C/w Vancomycin, renally dose. F/u with Vanc level in am. When level <15, dose vanocmycin 500mg x1  - Pain management recs  - will trial of oxycodone 10mg/325mg q4hr for severe pain given inability to bear weight on RLE  - BCLX NGTD   - WBAT, PT/OT as tolerated   - Ortho following, no surgical intervention as patient's main priorty is to walk. Goal of surgery would be to control infection and hip pain but patient not interested. Daughter will seek out second opinion.   - Podiatry following, no acute intervention from podiatry  - ID following

## 2023-04-21 NOTE — BH CONSULTATION LIAISON PROGRESS NOTE - NSBHCHARTREVIEWLAB_PSY_A_CORE FT
10.5   5.37  )-----------( 189      ( 21 Apr 2023 09:53 )             33.4     04-21    138  |  99  |  45<H>  ----------------------------<  147<H>  4.5   |  30  |  0.82    Ca    9.2      21 Apr 2023 09:53  Phos  5.5     04-20  Mg     2.2     04-20    TPro  8.3  /  Alb  3.7  /  TBili  0.3  /  DBili  x   /  AST  17  /  ALT  9<L>  /  AlkPhos  80  04-21

## 2023-04-21 NOTE — BH CONSULTATION LIAISON PROGRESS NOTE - NSBHATTESTCOMMENTATTENDFT_PSY_A_CORE
Patient is a 76 y/o F, , living in [place] with her , former /amita, with a PPHx of anxiety treated with xanax/klonopin, psychiatry consulted for suicidal statements made this morning. The patient confirms the statements were made in frustration and relates her feelings to increasing frustration and anxiety related to her ongoing health issues. She denies passive and active SIIP/HIIP and thoughts of self-harm. She is not amendable to starting any antidepressant medications.  4/21/23 Pt states she said  "I'm going to kill myself because I need a sedative". Through the interview the patient became increasingly frustrated with answering questions  but again stated this was said in frustration and denied any passive or active SIIP/HIIP or thoughts of self-harm. Attempt to contact daughter for collateral was made at approximately 11:15am but there was no answer.   Recommend enhanced supervision only.

## 2023-04-21 NOTE — PROVIDER CONTACT NOTE (OTHER) - ACTION/TREATMENT ORDERED:
Provider aware. Constant observation ordered. Pt placed on 1:1.
Provider ordered vanco trough before dose tomorrow

## 2023-04-21 NOTE — PROVIDER CONTACT NOTE (OTHER) - SITUATION
Pt refusing some medications. PA now aware. NNO
Pt refusal of z-flow boots as recommended by MD Ramos
Suicidal ideation noted as per pt comment to "kill herself "
vanco level 41.6, drawn after vanco was given

## 2023-04-21 NOTE — PROGRESS NOTE ADULT - ASSESSMENT
77F w/ PMHx of R USAMA (c/b multiple infections, and revisions with Dr. Be) HTN, HLD c/o R hip pain for 1 month, concerning recurrance MRSA right hip chronic OM with no orthopedic intervention to control infection source given patient's goal is to ambulate

## 2023-04-21 NOTE — PROGRESS NOTE ADULT - SUBJECTIVE AND OBJECTIVE BOX
DATE OF SERVICE: 04-21-23 @ 13:22    Patient is a 77y old  Female who presents with a chief complaint of Sent in from [facility] for worsening RIGHT hip pain (20 Apr 2023 19:47)      INTERVAL HISTORY: Offers no complaints.     REVIEW OF SYSTEMS:  CONSTITUTIONAL: No weakness  EYES/ENT: No visual changes;  No throat pain   NECK: No pain or stiffness  RESPIRATORY: No cough, wheezing; No shortness of breath  CARDIOVASCULAR: No chest pain or palpitations  GASTROINTESTINAL: No abdominal  pain. No nausea, vomiting, or hematemesis  GENITOURINARY: No dysuria, frequency or hematuria  NEUROLOGICAL: No stroke like symptoms  SKIN: No rashes    TELEMETRY Personally reviewed: SR   	  MEDICATIONS:  furosemide    Tablet 40 milliGRAM(s) Oral daily  hydrALAZINE 100 milliGRAM(s) Oral every 8 hours  losartan 100 milliGRAM(s) Oral daily  NIFEdipine XL 30 milliGRAM(s) Oral daily        PHYSICAL EXAM:  T(C): 36.8 (04-21-23 @ 11:22), Max: 36.8 (04-21-23 @ 04:01)  HR: 80 (04-21-23 @ 12:03) (72 - 83)  BP: 133/70 (04-21-23 @ 12:03) (130/54 - 158/67)  RR: 18 (04-21-23 @ 11:22) (18 - 18)  SpO2: 90% (04-21-23 @ 11:22) (90% - 97%)  Wt(kg): --  I&O's Summary    20 Apr 2023 07:01  -  21 Apr 2023 07:00  --------------------------------------------------------  IN: 120 mL / OUT: 2250 mL / NET: -2130 mL    21 Apr 2023 07:01  -  21 Apr 2023 13:22  --------------------------------------------------------  IN: 360 mL / OUT: 0 mL / NET: 360 mL          Appearance: In no distress	  HEENT:    PERRL, EOMI	  Cardiovascular:  S1 S2, No JVD  Respiratory: Lungs clear to auscultation	  Gastrointestinal:  Soft, Non-tender, + BS	  Vascularature:  No edema of LE  Psychiatric: Appropriate affect   Neuro: no acute focal deficits                               10.5   5.37  )-----------( 189      ( 21 Apr 2023 09:53 )             33.4     04-21    138  |  99  |  45<H>  ----------------------------<  147<H>  4.5   |  30  |  0.82    Ca    9.2      21 Apr 2023 09:53  Phos  5.5     04-20  Mg     2.2     04-20    TPro  8.3  /  Alb  3.7  /  TBili  0.3  /  DBili  x   /  AST  17  /  ALT  9<L>  /  AlkPhos  80  04-21        Labs personally reviewed      ASSESSMENT/PLAN: 	    78 y/o F--history of revision of a RIGHT USAMA in April 2019 with second revision of RIGHT hip Sept 2019, HTN, chronic anxiety disorder, recent hospitalization at [facility] 3 weeks ago for MRSA bacteremia with patient on IV vancomycin 750 mg Q12H with rifampin as a synergistic agent. Patient denies fever, chills, rigors.  No chest pain/pressure.  NO palpitations.   Patient denies dyspnoea but with poor exercise capacity.     1. MRSA bacteremia  - Blood Cx 4/11 NGTD  - Afebrile, no leukocytosis  - TRUDY if Cultures repeat + and if ID recommends  - TTE shows preserved EF, no evidence of vegetations    2. Chronic Diastolic Heart Failure  - CXR shows small left pleural effusion  - BNP 3206  - TTE shows preserved EF, no WMA, moderate AS  - c/w Lasix 40mg PO daily  - Apears euvolemic    3. Hypertension  - c/w losartan 100mg daily  - c/w hydralazine 100mg PO TID  - BP soft 4/20.  Nifedipine decreased to 30mg PO daily    4. DVT ppx  - c/w SQ Heparin    5. Cardiac Risk Stratification  - ECG NSR with no ischemia noted  - TTE shows preserved EF, no WMA  - Patient not in decompensated HF  - No hx of tachy meme arrhythmias  - No hx of severe MS/AS  - Patient is low-mod risk for low risk peripheral angiogram. No contraindication to proceed.  - Tolerated RLE angio well.           EMILIANA Wilson DO [facility]  Cardiovascular Medicine  [address] [place] [address], [address]  Available through call or text on [employer] TEAMs  Office: [phone]

## 2023-04-21 NOTE — BH CONSULTATION LIAISON PROGRESS NOTE - NSBHASSESSMENTFT_PSY_ALL_CORE
Patient is a 78 y/o F, , living in [place] with her , former /amita, with a PPHx of anxiety treated with xanax/klonopin, psychiatry consulted for suicidal statements made this morning. The patient confirms the statements were made in frustration and relates her feelings to increasing frustration and anxiety related to her ongoing health issues. She denies passive and active SIIP/HIIP and thoughts of self-harm. She is not amendable to starting any antidepressant medications.  Patient is a 76 y/o F, , living in [place] with her , former /amita, with a PPHx of anxiety treated with xanax/klonopin, psychiatry consulted for suicidal statements made this morning. The patient confirms the statements were made in frustration and relates her feelings to increasing frustration and anxiety related to her ongoing health issues. She denies passive and active SIIP/HIIP and thoughts of self-harm. She is not amendable to starting any antidepressant medications.  4/21/23 Pt states she said  "I'm going to kill myself because I need a sedative". Through the interview the patient became increasingly frustrated with answering questions  but again stated this was said in frustration and denied any passive or active SIIP/HIIP or thoughts of self-harm. Attempt to contact daughter for collateral was made at approximately 11:15am but there was no answer.

## 2023-04-22 LAB
ANION GAP SERPL CALC-SCNC: 11 MMOL/L — SIGNIFICANT CHANGE UP (ref 5–17)
APPEARANCE UR: CLEAR — SIGNIFICANT CHANGE UP
BILIRUB UR-MCNC: NEGATIVE — SIGNIFICANT CHANGE UP
BUN SERPL-MCNC: 46 MG/DL — HIGH (ref 7–23)
CALCIUM SERPL-MCNC: 9.3 MG/DL — SIGNIFICANT CHANGE UP (ref 8.4–10.5)
CHLORIDE SERPL-SCNC: 100 MMOL/L — SIGNIFICANT CHANGE UP (ref 96–108)
CO2 SERPL-SCNC: 27 MMOL/L — SIGNIFICANT CHANGE UP (ref 22–31)
COLOR SPEC: SIGNIFICANT CHANGE UP
CREAT SERPL-MCNC: 0.92 MG/DL — SIGNIFICANT CHANGE UP (ref 0.5–1.3)
DIFF PNL FLD: NEGATIVE — SIGNIFICANT CHANGE UP
EGFR: 65 ML/MIN/1.73M2 — SIGNIFICANT CHANGE UP
GLUCOSE SERPL-MCNC: 78 MG/DL — SIGNIFICANT CHANGE UP (ref 70–99)
GLUCOSE UR QL: NEGATIVE — SIGNIFICANT CHANGE UP
HCT VFR BLD CALC: 31.8 % — LOW (ref 34.5–45)
HGB BLD-MCNC: 10 G/DL — LOW (ref 11.5–15.5)
KETONES UR-MCNC: NEGATIVE — SIGNIFICANT CHANGE UP
LEUKOCYTE ESTERASE UR-ACNC: NEGATIVE — SIGNIFICANT CHANGE UP
MCHC RBC-ENTMCNC: 31.4 GM/DL — LOW (ref 32–36)
MCHC RBC-ENTMCNC: 31.4 PG — SIGNIFICANT CHANGE UP (ref 27–34)
MCV RBC AUTO: 100 FL — SIGNIFICANT CHANGE UP (ref 80–100)
NITRITE UR-MCNC: NEGATIVE — SIGNIFICANT CHANGE UP
NRBC # BLD: 0 /100 WBCS — SIGNIFICANT CHANGE UP (ref 0–0)
PH UR: 6.5 — SIGNIFICANT CHANGE UP (ref 5–8)
PLATELET # BLD AUTO: 166 K/UL — SIGNIFICANT CHANGE UP (ref 150–400)
POTASSIUM SERPL-MCNC: 4.8 MMOL/L — SIGNIFICANT CHANGE UP (ref 3.5–5.3)
POTASSIUM SERPL-SCNC: 4.8 MMOL/L — SIGNIFICANT CHANGE UP (ref 3.5–5.3)
PROT UR-MCNC: NEGATIVE — SIGNIFICANT CHANGE UP
RBC # BLD: 3.18 M/UL — LOW (ref 3.8–5.2)
RBC # FLD: 14.6 % — HIGH (ref 10.3–14.5)
SARS-COV-2 RNA SPEC QL NAA+PROBE: SIGNIFICANT CHANGE UP
SODIUM SERPL-SCNC: 138 MMOL/L — SIGNIFICANT CHANGE UP (ref 135–145)
SP GR SPEC: 1.01 — SIGNIFICANT CHANGE UP (ref 1.01–1.02)
UROBILINOGEN FLD QL: NEGATIVE — SIGNIFICANT CHANGE UP
VANCOMYCIN FLD-MCNC: 12.7 UG/ML — SIGNIFICANT CHANGE UP
WBC # BLD: 4.71 K/UL — SIGNIFICANT CHANGE UP (ref 3.8–10.5)
WBC # FLD AUTO: 4.71 K/UL — SIGNIFICANT CHANGE UP (ref 3.8–10.5)

## 2023-04-22 PROCEDURE — 99233 SBSQ HOSP IP/OBS HIGH 50: CPT

## 2023-04-22 RX ORDER — VANCOMYCIN HCL 1 G
500 VIAL (EA) INTRAVENOUS ONCE
Refills: 0 | Status: COMPLETED | OUTPATIENT
Start: 2023-04-22 | End: 2023-04-22

## 2023-04-22 RX ORDER — ZALEPLON 10 MG
5 CAPSULE ORAL ONCE
Refills: 0 | Status: DISCONTINUED | OUTPATIENT
Start: 2023-04-22 | End: 2023-04-22

## 2023-04-22 RX ORDER — ZOLPIDEM TARTRATE 10 MG/1
5 TABLET ORAL ONCE
Refills: 0 | Status: DISCONTINUED | OUTPATIENT
Start: 2023-04-22 | End: 2023-04-24

## 2023-04-22 RX ADMIN — ATORVASTATIN CALCIUM 40 MILLIGRAM(S): 80 TABLET, FILM COATED ORAL at 21:11

## 2023-04-22 RX ADMIN — Medication 100 MILLIGRAM(S): at 21:12

## 2023-04-22 RX ADMIN — Medication 100 MILLIGRAM(S): at 07:00

## 2023-04-22 RX ADMIN — ZOLPIDEM TARTRATE 5 MILLIGRAM(S): 10 TABLET ORAL at 21:18

## 2023-04-22 RX ADMIN — OXYCODONE AND ACETAMINOPHEN 2 TABLET(S): 5; 325 TABLET ORAL at 00:47

## 2023-04-22 RX ADMIN — Medication 1 TABLET(S): at 12:31

## 2023-04-22 RX ADMIN — CHLORHEXIDINE GLUCONATE 1 APPLICATION(S): 213 SOLUTION TOPICAL at 12:32

## 2023-04-22 RX ADMIN — Medication 81 MILLIGRAM(S): at 12:31

## 2023-04-22 RX ADMIN — GABAPENTIN 400 MILLIGRAM(S): 400 CAPSULE ORAL at 21:11

## 2023-04-22 RX ADMIN — GABAPENTIN 400 MILLIGRAM(S): 400 CAPSULE ORAL at 07:00

## 2023-04-22 RX ADMIN — LOSARTAN POTASSIUM 100 MILLIGRAM(S): 100 TABLET, FILM COATED ORAL at 07:01

## 2023-04-22 RX ADMIN — OXYCODONE AND ACETAMINOPHEN 2 TABLET(S): 5; 325 TABLET ORAL at 07:35

## 2023-04-22 RX ADMIN — Medication 30 MILLIGRAM(S): at 07:00

## 2023-04-22 RX ADMIN — Medication 100 MILLIGRAM(S): at 14:13

## 2023-04-22 RX ADMIN — Medication 500 MILLIGRAM(S): at 12:31

## 2023-04-22 RX ADMIN — Medication 1 APPLICATION(S): at 12:31

## 2023-04-22 RX ADMIN — Medication 100 MILLIGRAM(S): at 12:33

## 2023-04-22 RX ADMIN — OXYCODONE AND ACETAMINOPHEN 2 TABLET(S): 5; 325 TABLET ORAL at 07:01

## 2023-04-22 RX ADMIN — GABAPENTIN 400 MILLIGRAM(S): 400 CAPSULE ORAL at 14:12

## 2023-04-22 NOTE — PROGRESS NOTE ADULT - PROBLEM SELECTOR PLAN 2
BCLX NGTD. Has PICC line from [facility]. (OSH blood culture +MRSA 3/17, started abx 3/18, and aspiration culture MRSA+ 3/22). Plan for 8 weeks until 5/14  - recheck vano level daily. Goal level 15-20. Redose vancomcyin per level  - TTE negative for vegetations  -ID recs

## 2023-04-22 NOTE — PROGRESS NOTE ADULT - PROBLEM SELECTOR PLAN 1
s/p THR (c/b multiple infections and revisions) w/ R hip pain for 1 month. 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 MRSA infection of the hip last time which lead to surgery.    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 Changes may be related to loosening or infection in the appropriate setting.    - IR aspiration of R hip: synovial fluid analysis negative. Cell count, more neutrophilic predominant.   -  vanocmycin 500mg x1. Redose by level (OSH blood culture +MRSA 3/17, started abx 3/18, and aspiration culture MRSA+ 3/22). Plan for 8 weeks until 5/14  - BCLX NGTD   - WBAT, PT/OT as tolerated   - Ortho following, no surgical intervention as patient's main priorty is to walk. Goal of surgery would be to control infection and hip pain but patient not interested as her main goal is to ambulate. Daughter will seek out second opinion.   - Podiatry following, no acute intervention from podiatry  - ID following    #Acute on chronic pain  -likely from chronic hip infection  -chronic pain recs  - Pain management recs  - attempted to increased Percoet 10mg q4hr but patient more lethargic 4/22. Perocet decreased to q6hr prn for severe pain.   -bladder scan and check UAto r/o UTI

## 2023-04-22 NOTE — PROGRESS NOTE ADULT - SUBJECTIVE AND OBJECTIVE BOX
Patient is a 77y old  Female who presents with a chief complaint of Sent in from [facility] for worsening RIGHT hip pain (21 Apr 2023 17:14)        SUBJECTIVE / OVERNIGHT EVENTS: Patient had no acute events overnight. Patient seen and examined at bedside this morning. More sleepy this morning, perocet at 7am. But arousable. Reiterated she still has hip pain     ROS: unable to assess    MEDICATIONS  (STANDING):  ascorbic acid 500 milliGRAM(s) Oral daily  aspirin  chewable 81 milliGRAM(s) Oral daily  atorvastatin 40 milliGRAM(s) Oral at bedtime  chlorhexidine 2% Cloths 1 Application(s) Topical daily  collagenase Ointment 1 Application(s) Topical daily  enoxaparin Injectable 40 milliGRAM(s) SubCutaneous every 24 hours  furosemide    Tablet 40 milliGRAM(s) Oral daily  gabapentin 400 milliGRAM(s) Oral three times a day  hydrALAZINE 100 milliGRAM(s) Oral every 8 hours  lidocaine   4% Patch 1 Patch Transdermal daily  losartan 100 milliGRAM(s) Oral daily  melatonin 5 milliGRAM(s) Oral at bedtime  mirtazapine 7.5 milliGRAM(s) Oral at bedtime  multivitamin 1 Tablet(s) Oral daily  NIFEdipine XL 30 milliGRAM(s) Oral daily  polyethylene glycol 3350 17 Gram(s) Oral two times a day  senna 2 Tablet(s) Oral at bedtime    MEDICATIONS  (PRN):  aluminum hydroxide/magnesium hydroxide/simethicone Suspension 30 milliLiter(s) Oral every 4 hours PRN Dyspepsia  clonazePAM  Tablet 0.5 milliGRAM(s) Oral two times a day PRN for anxiety  ondansetron Injectable 4 milliGRAM(s) IV Push every 8 hours PRN Nausea and/or Vomiting  oxycodone    5 mG/acetaminophen 325 mG 2 Tablet(s) Oral every 6 hours PRN Severe Pain (7 - 10)  zolpidem 5 milliGRAM(s) Oral at bedtime PRN Insomnia      Vital Signs Last 24 Hrs  T(C): 36.3 (22 Apr 2023 11:14), Max: 36.9 (21 Apr 2023 21:23)  T(F): 97.4 (22 Apr 2023 11:14), Max: 98.4 (21 Apr 2023 21:23)  HR: 72 (22 Apr 2023 11:14) (69 - 84)  BP: 127/62 (22 Apr 2023 11:14) (127/62 - 162/68)  BP(mean): --  RR: 18 (22 Apr 2023 11:14) (17 - 917)  SpO2: 94% (22 Apr 2023 11:14) (92% - 94%)    Parameters below as of 22 Apr 2023 11:14  Patient On (Oxygen Delivery Method): nasal cannula  O2 Flow (L/min): 2    CAPILLARY BLOOD GLUCOSE        I&O's Summary    21 Apr 2023 07:01  -  22 Apr 2023 07:00  --------------------------------------------------------  IN: 600 mL / OUT: 0 mL / NET: 600 mL    22 Apr 2023 07:01  -  22 Apr 2023 13:47  --------------------------------------------------------  IN: 240 mL / OUT: 0 mL / NET: 240 mL        PHYSICAL EXAM  GENERAL: NAD, lying comfortably in bed, lethargic but arousable  HEENT:  Atraumatic, Normocephalic, EOMI, conjunctiva and sclera clear  CHEST/LUNG: Clear to auscultation bilaterally; No wheeze  HEART: RRR, S1 and S2 No murmurs, rubs, or gallops  ABDOMEN: Soft, Nontender, Nondistended; Bowel sounds present  EXTREMITIES:  pedal pulses +, right above knee and hip nontender  NEURO: AAOx2  SKIN: No rashes or lesions    LABS:                        10.0   4.71  )-----------( 166      ( 22 Apr 2023 07:23 )             31.8     04-22    138  |  100  |  46<H>  ----------------------------<  78  4.8   |  27  |  0.92    Ca    9.3      22 Apr 2023 07:22    TPro  8.3  /  Alb  3.7  /  TBili  0.3  /  DBili  x   /  AST  17  /  ALT  9<L>  /  AlkPhos  80  04-21                RADIOLOGY & ADDITIONAL TESTS:      Labs Personally Reviewed  Imaging Personally Reviewed  Consultant(s) Notes Reviewed

## 2023-04-22 NOTE — PROGRESS NOTE ADULT - NSPROGADDITIONALINFOA_GEN_ALL_CORE
Updated daughter via phone  Check UA and bladder scan. Perocet q6hr prn. Dispo planning    Alyson Mota MD  Division of Hospital Medicine  Available on Microsoft Teams

## 2023-04-23 LAB
ANION GAP SERPL CALC-SCNC: 9 MMOL/L — SIGNIFICANT CHANGE UP (ref 5–17)
BUN SERPL-MCNC: 43 MG/DL — HIGH (ref 7–23)
CALCIUM SERPL-MCNC: 8.9 MG/DL — SIGNIFICANT CHANGE UP (ref 8.4–10.5)
CHLORIDE SERPL-SCNC: 100 MMOL/L — SIGNIFICANT CHANGE UP (ref 96–108)
CO2 SERPL-SCNC: 29 MMOL/L — SIGNIFICANT CHANGE UP (ref 22–31)
CREAT SERPL-MCNC: 0.89 MG/DL — SIGNIFICANT CHANGE UP (ref 0.5–1.3)
EGFR: 68 ML/MIN/1.73M2 — SIGNIFICANT CHANGE UP
GLUCOSE SERPL-MCNC: 104 MG/DL — HIGH (ref 70–99)
HCT VFR BLD CALC: 31.8 % — LOW (ref 34.5–45)
HGB BLD-MCNC: 9.9 G/DL — LOW (ref 11.5–15.5)
MCHC RBC-ENTMCNC: 31.1 GM/DL — LOW (ref 32–36)
MCHC RBC-ENTMCNC: 31.2 PG — SIGNIFICANT CHANGE UP (ref 27–34)
MCV RBC AUTO: 100.3 FL — HIGH (ref 80–100)
NRBC # BLD: 0 /100 WBCS — SIGNIFICANT CHANGE UP (ref 0–0)
PLATELET # BLD AUTO: 186 K/UL — SIGNIFICANT CHANGE UP (ref 150–400)
POTASSIUM SERPL-MCNC: 4.7 MMOL/L — SIGNIFICANT CHANGE UP (ref 3.5–5.3)
POTASSIUM SERPL-SCNC: 4.7 MMOL/L — SIGNIFICANT CHANGE UP (ref 3.5–5.3)
RBC # BLD: 3.17 M/UL — LOW (ref 3.8–5.2)
RBC # FLD: 14.5 % — SIGNIFICANT CHANGE UP (ref 10.3–14.5)
SODIUM SERPL-SCNC: 138 MMOL/L — SIGNIFICANT CHANGE UP (ref 135–145)
VANCOMYCIN FLD-MCNC: 22 UG/ML — SIGNIFICANT CHANGE UP
WBC # BLD: 5.18 K/UL — SIGNIFICANT CHANGE UP (ref 3.8–10.5)
WBC # FLD AUTO: 5.18 K/UL — SIGNIFICANT CHANGE UP (ref 3.8–10.5)

## 2023-04-23 PROCEDURE — 99233 SBSQ HOSP IP/OBS HIGH 50: CPT

## 2023-04-23 RX ADMIN — Medication 100 MILLIGRAM(S): at 21:46

## 2023-04-23 RX ADMIN — Medication 40 MILLIGRAM(S): at 05:03

## 2023-04-23 RX ADMIN — Medication 0.5 MILLIGRAM(S): at 20:17

## 2023-04-23 RX ADMIN — ZOLPIDEM TARTRATE 5 MILLIGRAM(S): 10 TABLET ORAL at 21:48

## 2023-04-23 RX ADMIN — Medication 30 MILLIGRAM(S): at 05:03

## 2023-04-23 RX ADMIN — Medication 1 APPLICATION(S): at 11:34

## 2023-04-23 RX ADMIN — GABAPENTIN 400 MILLIGRAM(S): 400 CAPSULE ORAL at 21:47

## 2023-04-23 RX ADMIN — CHLORHEXIDINE GLUCONATE 1 APPLICATION(S): 213 SOLUTION TOPICAL at 11:34

## 2023-04-23 RX ADMIN — GABAPENTIN 400 MILLIGRAM(S): 400 CAPSULE ORAL at 05:03

## 2023-04-23 RX ADMIN — Medication 81 MILLIGRAM(S): at 11:33

## 2023-04-23 RX ADMIN — LOSARTAN POTASSIUM 100 MILLIGRAM(S): 100 TABLET, FILM COATED ORAL at 05:03

## 2023-04-23 RX ADMIN — POLYETHYLENE GLYCOL 3350 17 GRAM(S): 17 POWDER, FOR SOLUTION ORAL at 17:42

## 2023-04-23 RX ADMIN — Medication 100 MILLIGRAM(S): at 05:03

## 2023-04-23 RX ADMIN — Medication 1 TABLET(S): at 11:33

## 2023-04-23 RX ADMIN — ATORVASTATIN CALCIUM 40 MILLIGRAM(S): 80 TABLET, FILM COATED ORAL at 21:47

## 2023-04-23 RX ADMIN — GABAPENTIN 400 MILLIGRAM(S): 400 CAPSULE ORAL at 13:31

## 2023-04-23 RX ADMIN — Medication 500 MILLIGRAM(S): at 11:33

## 2023-04-23 RX ADMIN — POLYETHYLENE GLYCOL 3350 17 GRAM(S): 17 POWDER, FOR SOLUTION ORAL at 05:02

## 2023-04-23 RX ADMIN — Medication 100 MILLIGRAM(S): at 13:31

## 2023-04-23 NOTE — PROGRESS NOTE ADULT - PROBLEM SELECTOR PLAN 2
BCLX NGTD. Has PICC line from [facility]. (OSH blood culture +MRSA 3/17, started abx 3/18, and aspiration culture MRSA+ 3/22). Plan for 8 weeks until 5/14  - recheck vano level daily. Goal level 15-20. Redose vancomycin per level  - TTE negative for vegetations  -ID recs

## 2023-04-23 NOTE — PROGRESS NOTE ADULT - SUBJECTIVE AND OBJECTIVE BOX
Patient is a 77y old  Female who presents with a chief complaint of Sent in from [facility] for worsening RIGHT hip pain (2023 13:47)        SUBJECTIVE / OVERNIGHT EVENTS: Patient had no acute events overnight. Patient seen and examined at bedside this morning. More awake today, reporting percocet brings pain down to 6/10.     ROS: [ - ] Fever [ - ] Chills [ - ] Nausea/Vomiting [ - ] Chest Pain [ - ] Shortness of breath     MEDICATIONS  (STANDING):  ascorbic acid 500 milliGRAM(s) Oral daily  aspirin  chewable 81 milliGRAM(s) Oral daily  atorvastatin 40 milliGRAM(s) Oral at bedtime  chlorhexidine 2% Cloths 1 Application(s) Topical daily  collagenase Ointment 1 Application(s) Topical daily  enoxaparin Injectable 40 milliGRAM(s) SubCutaneous every 24 hours  furosemide    Tablet 40 milliGRAM(s) Oral daily  gabapentin 400 milliGRAM(s) Oral three times a day  hydrALAZINE 100 milliGRAM(s) Oral every 8 hours  lidocaine   4% Patch 1 Patch Transdermal daily  losartan 100 milliGRAM(s) Oral daily  melatonin 5 milliGRAM(s) Oral at bedtime  mirtazapine 7.5 milliGRAM(s) Oral at bedtime  multivitamin 1 Tablet(s) Oral daily  NIFEdipine XL 30 milliGRAM(s) Oral daily  polyethylene glycol 3350 17 Gram(s) Oral two times a day  senna 2 Tablet(s) Oral at bedtime    MEDICATIONS  (PRN):  aluminum hydroxide/magnesium hydroxide/simethicone Suspension 30 milliLiter(s) Oral every 4 hours PRN Dyspepsia  clonazePAM  Tablet 0.5 milliGRAM(s) Oral two times a day PRN for anxiety  ondansetron Injectable 4 milliGRAM(s) IV Push every 8 hours PRN Nausea and/or Vomiting  oxycodone    5 mG/acetaminophen 325 mG 2 Tablet(s) Oral every 6 hours PRN Severe Pain (7 - 10)  zolpidem 5 milliGRAM(s) Oral at bedtime PRN Insomnia  zolpidem 5 milliGRAM(s) Oral once PRN Insomnia      Vital Signs Last 24 Hrs  T(C): 36.5 (2023 11:15), Max: 37.1 (2023 04:39)  T(F): 97.7 (2023 11:15), Max: 98.7 (2023 04:39)  HR: 72 (2023 11:15) (72 - 79)  BP: 128/62 (2023 11:15) (128/62 - 151/61)  BP(mean): --  RR: 18 (2023 11:15) (18 - 18)  SpO2: 96% (2023 11:15) (95% - 96%)    Parameters below as of 2023 11:15  Patient On (Oxygen Delivery Method): nasal cannula  O2 Flow (L/min): 2    CAPILLARY BLOOD GLUCOSE        I&O's Summary    2023 07:01  -  2023 07:00  --------------------------------------------------------  IN: 240 mL / OUT: 1300 mL / NET: -1060 mL    2023 07:01  -  2023 16:55  --------------------------------------------------------  IN: 360 mL / OUT: 0 mL / NET: 360 mL        PHYSICAL EXAM  GENERAL: NAD, lying comfortably in bed  HEENT:  Atraumatic, Normocephalic, EOMI, conjunctiva and sclera clear  CHEST/LUNG: Clear to auscultation bilaterally; No wheeze  HEART: RRR, S1 and S2 No murmurs, rubs, or gallops  ABDOMEN: Soft, Nontender, Nondistended; Bowel sounds present  EXTREMITIES:  pedal pulses +, right above knee and hip nontender  NEURO: AAOx2-3, ambulates with walker, unable to bear weight on right leg  SKIN: No rashes or lesions  LABS:                        9.9    5.18  )-----------( 186      ( 2023 06:14 )             31.8     04-23    138  |  100  |  43<H>  ----------------------------<  104<H>  4.7   |  29  |  0.89    Ca    8.9      2023 06:14            Urinalysis Basic - ( 2023 14:37 )    Color: Light Yellow / Appearance: Clear / S.014 / pH: x  Gluc: x / Ketone: Negative  / Bili: Negative / Urobili: Negative   Blood: x / Protein: Negative / Nitrite: Negative   Leuk Esterase: Negative / RBC: x / WBC x   Sq Epi: x / Non Sq Epi: x / Bacteria: x          RADIOLOGY & ADDITIONAL TESTS:      Labs Personally Reviewed  Imaging Personally Reviewed  Consultant(s) Notes Reviewed

## 2023-04-23 NOTE — PROGRESS NOTE ADULT - PROBLEM SELECTOR PLAN 1
s/p THR (c/b multiple infections and revisions) w/ R hip pain for 1 month. 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 MRSA infection of the hip last time which lead to surgery.    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 Changes may be related to loosening or infection in the appropriate setting.    - IR aspiration of R hip: synovial fluid analysis negative. Cell count, more neutrophilic predominant.   -  vanocmycin 500mg x1. Redose by level (OSH blood culture +MRSA 3/17, started abx 3/18, and aspiration culture MRSA+ 3/22). Plan for 8 weeks until 5/14  - BCLX NGTD   - WBAT, PT/OT as tolerated   - Ortho discussed options with patient and daughter. No surgical intervention Goal of surgery would be to control infection and hip pain but patient not interested as her main goal is to ambulate. Daughter will seek out second opinion.   - Podiatry following, no acute intervention from podiatry  - ID following    #Acute on chronic pain  -likely from chronic hip infection  -chronic pain recs  - Pain management recs  - attempted to increased Percoet 10mg q4hr but patient more lethargic 4/22. Percocet decreased to q6hr prn for severe pain.

## 2023-04-23 NOTE — PROGRESS NOTE ADULT - SUBJECTIVE AND OBJECTIVE BOX
DATE OF SERVICE: 04-23-23 @ 21:34    Patient is a 77y old  Female who presents with a chief complaint of Sent in from [facility] for worsening RIGHT hip pain (23 Apr 2023 16:55)      INTERVAL HISTORY: feels ok     	  MEDICATIONS:  furosemide    Tablet 40 milliGRAM(s) Oral daily  hydrALAZINE 100 milliGRAM(s) Oral every 8 hours  losartan 100 milliGRAM(s) Oral daily  NIFEdipine XL 30 milliGRAM(s) Oral daily        PHYSICAL EXAM:  T(C): 36.6 (04-23-23 @ 20:00), Max: 37.1 (04-23-23 @ 04:39)  HR: 78 (04-23-23 @ 20:00) (72 - 78)  BP: 170/70 (04-23-23 @ 20:00) (128/62 - 170/70)  RR: 18 (04-23-23 @ 20:00) (18 - 18)  SpO2: 93% (04-23-23 @ 20:00) (93% - 96%)  Wt(kg): --  I&O's Summary    22 Apr 2023 07:01  -  23 Apr 2023 07:00  --------------------------------------------------------  IN: 240 mL / OUT: 1300 mL / NET: -1060 mL    23 Apr 2023 07:01  -  23 Apr 2023 21:34  --------------------------------------------------------  IN: 360 mL / OUT: 0 mL / NET: 360 mL          Appearance: In no distress	  HEENT:    PERRL, EOMI	  Cardiovascular:  S1 S2, No JVD  Respiratory: Lungs clear to auscultation	  Gastrointestinal:  Soft, Non-tender, + BS	  Vascularature:  No edema of LE  Psychiatric: Appropriate affect   Neuro: no acute focal deficits                               9.9    5.18  )-----------( 186      ( 23 Apr 2023 06:14 )             31.8     04-23    138  |  100  |  43<H>  ----------------------------<  104<H>  4.7   |  29  |  0.89    Ca    8.9      23 Apr 2023 06:14          Labs personally reviewed      ASSESSMENT/PLAN: 	    78 y/o F--history of revision of a RIGHT USAMA in April 2019 with second revision of RIGHT hip Sept 2019, HTN, chronic anxiety disorder, recent hospitalization at [facility] 3 weeks ago for MRSA bacteremia with patient on IV vancomycin 750 mg Q12H with rifampin as a synergistic agent. Patient denies fever, chills, rigors.  No chest pain/pressure.  NO palpitations.   Patient denies dyspnoea but with poor exercise capacity.     1. MRSA bacteremia  - Blood Cx 4/11 NGTD  - Afebrile, no leukocytosis  - TRUDY if Cultures repeat + and if ID recommends  - TTE shows preserved EF, no evidence of vegetations    2. Chronic Diastolic Heart Failure  - CXR shows small left pleural effusion  - BNP 3206  - TTE shows preserved EF, no WMA, moderate AS  - c/w Lasix 40mg PO daily  - Apears euvolemic    3. Hypertension  - c/w losartan 100mg daily  - c/w hydralazine 100mg PO TID  - BP soft 4/20.  Nifedipine decreased to 30mg PO daily    4. DVT ppx  - c/w SQ Heparin    5. Cardiac Risk Stratification  - ECG NSR with no ischemia noted  - TTE shows preserved EF, no WMA  - Patient not in decompensated HF  - No hx of tachy meme arrhythmias  - No hx of severe MS/AS  - Patient is low-mod risk for low risk peripheral angiogram. No contraindication to proceed.  - Tolerated RLE angio well.             Domenico Christianson DO [facility]  Cardiovascular Medicine  [address], [address]  Office: [phone]  Available via Text/call on Microsoft Teams

## 2023-04-23 NOTE — PROGRESS NOTE ADULT - NSPROGADDITIONALINFOA_GEN_ALL_CORE
Medically optimized for dispo to [facility]    Check vancomycin AM levels and redose vancomycin. f/u ID recs for dispo vancomycin dosing. Updated daughter 4/22    Alyson Mota MD  Division of Hospital Medicine  Available on Microsoft Teams

## 2023-04-24 ENCOUNTER — APPOINTMENT (OUTPATIENT)
Dept: ORTHOPEDIC SURGERY | Facility: CLINIC | Age: 78
End: 2023-04-24

## 2023-04-24 DIAGNOSIS — E78.5 HYPERLIPIDEMIA, UNSPECIFIED: ICD-10-CM

## 2023-04-24 DIAGNOSIS — I10 ESSENTIAL (PRIMARY) HYPERTENSION: ICD-10-CM

## 2023-04-24 LAB — VANCOMYCIN FLD-MCNC: 18.3 UG/ML — SIGNIFICANT CHANGE UP

## 2023-04-24 PROCEDURE — 99232 SBSQ HOSP IP/OBS MODERATE 35: CPT

## 2023-04-24 RX ORDER — DAPTOMYCIN 500 MG/10ML
400 INJECTION, POWDER, LYOPHILIZED, FOR SOLUTION INTRAVENOUS EVERY 24 HOURS
Refills: 0 | Status: DISCONTINUED | OUTPATIENT
Start: 2023-04-26 | End: 2023-04-26

## 2023-04-24 RX ORDER — CLONAZEPAM 1 MG
0.5 TABLET ORAL EVERY 8 HOURS
Refills: 0 | Status: DISCONTINUED | OUTPATIENT
Start: 2023-04-24 | End: 2023-04-26

## 2023-04-24 RX ORDER — VANCOMYCIN HCL 1 G
500 VIAL (EA) INTRAVENOUS ONCE
Refills: 0 | Status: COMPLETED | OUTPATIENT
Start: 2023-04-24 | End: 2023-04-24

## 2023-04-24 RX ADMIN — ENOXAPARIN SODIUM 40 MILLIGRAM(S): 100 INJECTION SUBCUTANEOUS at 09:57

## 2023-04-24 RX ADMIN — GABAPENTIN 400 MILLIGRAM(S): 400 CAPSULE ORAL at 21:31

## 2023-04-24 RX ADMIN — Medication 500 MILLIGRAM(S): at 12:57

## 2023-04-24 RX ADMIN — Medication 100 MILLIGRAM(S): at 13:01

## 2023-04-24 RX ADMIN — Medication 81 MILLIGRAM(S): at 12:57

## 2023-04-24 RX ADMIN — GABAPENTIN 400 MILLIGRAM(S): 400 CAPSULE ORAL at 05:51

## 2023-04-24 RX ADMIN — Medication 5 MILLIGRAM(S): at 00:19

## 2023-04-24 RX ADMIN — Medication 0.5 MILLIGRAM(S): at 09:45

## 2023-04-24 RX ADMIN — Medication 40 MILLIGRAM(S): at 05:51

## 2023-04-24 RX ADMIN — Medication 100 MILLIGRAM(S): at 05:52

## 2023-04-24 RX ADMIN — GABAPENTIN 400 MILLIGRAM(S): 400 CAPSULE ORAL at 13:01

## 2023-04-24 RX ADMIN — CHLORHEXIDINE GLUCONATE 1 APPLICATION(S): 213 SOLUTION TOPICAL at 12:58

## 2023-04-24 RX ADMIN — Medication 100 MILLIGRAM(S): at 21:24

## 2023-04-24 RX ADMIN — Medication 100 MILLIGRAM(S): at 09:45

## 2023-04-24 RX ADMIN — Medication 30 MILLIGRAM(S): at 05:52

## 2023-04-24 RX ADMIN — LOSARTAN POTASSIUM 100 MILLIGRAM(S): 100 TABLET, FILM COATED ORAL at 05:51

## 2023-04-24 RX ADMIN — ZOLPIDEM TARTRATE 5 MILLIGRAM(S): 10 TABLET ORAL at 22:40

## 2023-04-24 RX ADMIN — Medication 1 TABLET(S): at 12:58

## 2023-04-24 RX ADMIN — Medication 1 APPLICATION(S): at 12:58

## 2023-04-24 NOTE — PROGRESS NOTE ADULT - PROBLEM SELECTOR PLAN 2
- Has PICC line from [facility]. (OSH blood culture +MRSA 3/17, started abx 3/18, and aspiration culture MRSA+ 3/22)  - TTE negative for vegetations  - ID following, recs appreciated  - Plan for 8 weeks until 5/14  - discussed with ID Attending Dr. Montoya, given difficulty variability of her vanco troughs and likely would need q48h dosing with dose adjustments, decision made to change to daptomycin  - start daptomycin 400mg IVPB q24h to complete abx course through 5/14  - has R PICC line in place already

## 2023-04-24 NOTE — PROGRESS NOTE ADULT - ASSESSMENT
76 yo female PMH of R USAMA (c/b multiple infections, and revisions with Dr. 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

## 2023-04-24 NOTE — PROGRESS NOTE ADULT - ASSESSMENT
76 y/o F--history of revision of a RIGHT USAMA in April 2019 with second revision of RIGHT hip Sept 2019, essential HTN, chronic anxiety disorder with a recent hospitalization at [facility] 3 weeks ago in the setting of apparent MRSA bacteremia with patient on IV vancomycin 750 mg Q12H with rifampin as a synergistic agent. Patient with severe pain despite oral Rx at [facility] and was sent to the ER.      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  MRSA infection of the hip last time which lead to surgery.       PLAN:   pt on vanco received a dose today.  pt with unreliable vanco level   will switch to daptomycin 400 mg q24h   check CPK   no need for rifampin, especially if the concern is hip as a source, pt with chronic OM, the treatment for chronic OM is surgical debridement and not abx.   Risk of interaction with other meds with rifampin very high.   blood cx negative   Ortho consulted   s/p IR guided aspiration of the rt hip joint. cell count not compatible with infection but pt on abx already and glucose very low suggestive of infection.   synovial fluid cx NTD.   discussed with pt's daughter, pt had aspiration of the hip performed at OSH which was positive for MRSA.   Ankle ulcer, s/p podiatry eval.   TTE with no vegetations.   Per records from [place], pt with + blood cx with MRSA on 3/17, started on abx 3/18, hip aspiration performed 3/22 was positive for MRSA.   planned for 8 weeks of therapy until 5/14/23.   will need cbc, cmp and cpk once a week.   the abx therapy in her case is not curable just suppressive.     Plan discussed with medicine attending.       Aurelio Montoya  Please contact through MS Teams   If no response or past 5 pm/weekend call [phone].

## 2023-04-24 NOTE — PROGRESS NOTE ADULT - SUBJECTIVE AND OBJECTIVE BOX
Duyen Cazares MD  Division of Hospital Medicine  [facility]   Available on Microsoft Teams (Mon-Fri 8am-5pm)    * messages preferred prior to calls  Other Times:  [phone]      Patient is a 77y old  Female who presents with a chief complaint of Sent in from [facility] for worsening RIGHT hip pain (24 Apr 2023 14:58)      SUBJECTIVE / OVERNIGHT EVENTS: no acute events overnight. no fever, chills, chest pain, nor dyspnea. feels anxious and overwhelmed with everything that is going on but otherwise doing okay.  ADDITIONAL REVIEW OF SYSTEMS:    Tele reviewed : SR with HR 70-80s    MEDICATIONS  (STANDING):  ascorbic acid 500 milliGRAM(s) Oral daily  aspirin  chewable 81 milliGRAM(s) Oral daily  chlorhexidine 2% Cloths 1 Application(s) Topical daily  collagenase Ointment 1 Application(s) Topical daily  enoxaparin Injectable 40 milliGRAM(s) SubCutaneous every 24 hours  furosemide    Tablet 40 milliGRAM(s) Oral daily  gabapentin 400 milliGRAM(s) Oral three times a day  hydrALAZINE 100 milliGRAM(s) Oral every 8 hours  lidocaine   4% Patch 1 Patch Transdermal daily  losartan 100 milliGRAM(s) Oral daily  melatonin 5 milliGRAM(s) Oral at bedtime  mirtazapine 7.5 milliGRAM(s) Oral at bedtime  multivitamin 1 Tablet(s) Oral daily  NIFEdipine XL 30 milliGRAM(s) Oral daily  polyethylene glycol 3350 17 Gram(s) Oral two times a day  senna 2 Tablet(s) Oral at bedtime    MEDICATIONS  (PRN):  aluminum hydroxide/magnesium hydroxide/simethicone Suspension 30 milliLiter(s) Oral every 4 hours PRN Dyspepsia  clonazePAM  Tablet 0.5 milliGRAM(s) Oral every 8 hours PRN for anxiety  ondansetron Injectable 4 milliGRAM(s) IV Push every 8 hours PRN Nausea and/or Vomiting  oxycodone    5 mG/acetaminophen 325 mG 2 Tablet(s) Oral every 6 hours PRN Severe Pain (7 - 10)  zolpidem 5 milliGRAM(s) Oral at bedtime PRN Insomnia      CAPILLARY BLOOD GLUCOSE        I&O's Summary    23 Apr 2023 07:01  -  24 Apr 2023 07:00  --------------------------------------------------------  IN: 360 mL / OUT: 450 mL / NET: -90 mL    24 Apr 2023 07:01  -  24 Apr 2023 16:48  --------------------------------------------------------  IN: 320 mL / OUT: 0 mL / NET: 320 mL        PHYSICAL EXAM:  Vital Signs Last 24 Hrs  T(C): 37 (24 Apr 2023 12:45), Max: 37 (24 Apr 2023 12:45)  T(F): 98.6 (24 Apr 2023 12:45), Max: 98.6 (24 Apr 2023 12:45)  HR: 76 (24 Apr 2023 12:45) (73 - 78)  BP: 128/50 (24 Apr 2023 12:45) (128/50 - 170/70)  BP(mean): --  RR: 18 (24 Apr 2023 12:47) (18 - 18)  SpO2: 98% (24 Apr 2023 12:47) (87% - 98%)    Parameters below as of 24 Apr 2023 12:47  Patient On (Oxygen Delivery Method): nasal cannula  O2 Flow (L/min): 2      CONSTITUTIONAL: NAD, comfortable appearing  EYES: PERRLA; conjunctiva and sclera clear  ENMT: Moist oral mucosa, no pharyngeal injection or exudates; normal dentition  NECK: Supple, no palpable masses; no thyromegaly  RESPIRATORY: Normal respiratory effort; lungs are clear to auscultation bilaterally  CARDIOVASCULAR: Regular rate and rhythm, normal S1 and S2, no murmur/rub/gallop; No lower extremity edema  ABDOMEN: Soft, Nondistended, Nontender to palpation, normoactive bowel sounds  MUSCULOSKELETAL: No clubbing or cyanosis of digits; no joint swelling or tenderness to palpation  PSYCH: A+O to person, place, and time; affect appropriate, forgetful  NEUROLOGY: CN 2-12 are intact and symmetric; no gross sensory deficits   SKIN: No rashes; no palpable lesions, +R aspiration site c/d/i, +RUE PICC line c/d/i      LABS:                        9.9    5.18  )-----------( 186      ( 23 Apr 2023 06:14 )             31.8     04-23    138  |  100  |  43<H>  ----------------------------<  104<H>  4.7   |  29  |  0.89    Ca    8.9      23 Apr 2023 06:14        RADIOLOGY & ADDITIONAL TESTS:  Results Reviewed: no leukocytosis, H/H stable   Imaging Personally Reviewed:  Electrocardiogram Personally Reviewed:    COORDINATION OF CARE:  Care Discussed with Consultants/Other Providers [Y]: ID Attending Dr. Montoya, medicine LELAND Newman  Prior or Outpatient Records Reviewed [Y/N]:

## 2023-04-24 NOTE — PROGRESS NOTE ADULT - NSPROGADDITIONALINFOA_GEN_ALL_CORE
.  Duyen Cazares MD  Division of Hospital Medicine  [facility]   Available on Microsoft Teams - messages preferred prior to calls.    Plan discussed with patient, ID Attending Dr. Montoya, and medicine LELAND Newman.  Attempted to call daughter Brittni with update twice but no answer both times. Will try again tomorrow.

## 2023-04-24 NOTE — PROGRESS NOTE ADULT - PROBLEM SELECTOR PLAN 1
s/p THR (c/b multiple infections and revisions) w/ R hip pain for 1 month. Given hx of MRSA bacteremia, concern for chronic OM of R hip prosthetic hip. MRSA infection of the hip last time which lead to surgery.  -  CT femur: Revision type right total hip arthroplasty is present with partial absence of the proximal femur, extensive heterotopic ossification around the proximal femoral stem, lucency around the femoral stem, and smooth periosteal thickening of the mid femoral diaphysis. Changes may be related to loosening or infection in the appropriate setting.  - IR aspiration of R hip: synovial fluid analysis negative. Cell count, more neutrophilic predominant.   - aspiration culture MRSA+ 3/22. plan for 8 weeks of antibiotics through 5/14/23.  - WBAT, PT/OT as tolerated Plan for 8 weeks until 5/14  - Ortho discussed options with patient and daughter. No surgical intervention Goal of surgery would be to control infection and hip pain but patient not interested as her main goal is to ambulate. Daughter will seek out second opinion.   - Podiatry following, no acute intervention from podiatry  - ID following    #Acute on chronic pain  - likely from chronic hip infection  - chronic pain recs appreciated  - c/w percocet 10/325mg q6h PRN for severe pain, would not increase frequency to q4h given patient with increased lethargy on this dose

## 2023-04-24 NOTE — PROGRESS NOTE ADULT - SUBJECTIVE AND OBJECTIVE BOX
DATE OF SERVICE: 04-24-23 @ 14:59    Patient is a 77y old  Female who presents with a chief complaint of Sent in from [facility] for worsening RIGHT hip pain (23 Apr 2023 21:34)      INTERVAL HISTORY: Feels ok.     REVIEW OF SYSTEMS:  CONSTITUTIONAL: No weakness  EYES/ENT: No visual changes;  No throat pain   NECK: No pain or stiffness  RESPIRATORY: No cough, wheezing; No shortness of breath  CARDIOVASCULAR: No chest pain or palpitations  GASTROINTESTINAL: No abdominal  pain. No nausea, vomiting, or hematemesis  GENITOURINARY: No dysuria, frequency or hematuria  NEUROLOGICAL: No stroke like symptoms  SKIN: No rashes    TELEMETRY Personally reviewed: SR 60-80  	  MEDICATIONS:  furosemide    Tablet 40 milliGRAM(s) Oral daily  hydrALAZINE 100 milliGRAM(s) Oral every 8 hours  losartan 100 milliGRAM(s) Oral daily  NIFEdipine XL 30 milliGRAM(s) Oral daily        PHYSICAL EXAM:  T(C): 37 (04-24-23 @ 12:45), Max: 37 (04-24-23 @ 12:45)  HR: 76 (04-24-23 @ 12:45) (73 - 78)  BP: 128/50 (04-24-23 @ 12:45) (128/50 - 170/70)  RR: 18 (04-24-23 @ 12:47) (18 - 18)  SpO2: 98% (04-24-23 @ 12:47) (87% - 98%)  Wt(kg): --  I&O's Summary    23 Apr 2023 07:01  -  24 Apr 2023 07:00  --------------------------------------------------------  IN: 360 mL / OUT: 450 mL / NET: -90 mL    24 Apr 2023 07:01  -  24 Apr 2023 14:59  --------------------------------------------------------  IN: 320 mL / OUT: 0 mL / NET: 320 mL          Appearance: In no distress	  HEENT:    PERRL, EOMI	  Cardiovascular:  S1 S2, No JVD  Respiratory: Lungs clear to auscultation	  Gastrointestinal:  Soft, Non-tender, + BS	  Vascularature:  No edema of LE  Psychiatric: Appropriate affect   Neuro: no acute focal deficits                               9.9    5.18  )-----------( 186      ( 23 Apr 2023 06:14 )             31.8     04-23    138  |  100  |  43<H>  ----------------------------<  104<H>  4.7   |  29  |  0.89    Ca    8.9      23 Apr 2023 06:14          Labs personally reviewed      ASSESSMENT/PLAN: 	    78 y/o F--history of 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 Patient denies fever, chills, rigors.  No chest pain/pressure.  NO palpitations.   Patient denies dyspnoea but with poor exercise capacity.     1. MRSA bacteremia  - Blood Cx 4/11 NGTD  - Afebrile, no leukocytosis  - TTE shows preserved EF, no evidence of vegetations    2. Chronic Diastolic Heart Failure  - CXR shows small left pleural effusion  - BNP 3206  - TTE shows preserved EF, no WMA, moderate AS  - c/w Lasix 40mg PO daily  - Apears euvolemic    3. Hypertension  - c/w losartan 100mg daily  - c/w hydralazine 100mg PO TID  - BP soft 4/20.  Nifedipine decreased to 30mg PO daily    4. DVT ppx  - c/w SQ Heparin    5. Cardiac Risk Stratification  - ECG NSR with no ischemia noted  - TTE shows preserved EF, no WMA  - Patient not in decompensated HF  - No hx of tachy meme arrhythmias  - No hx of severe MS/AS  - Patient is low-mod risk for low risk peripheral angiogram. No contraindication to proceed.  - Tolerated RLE angio well.             Amarilys Vásquez, SERVANDO-NP   Domenico Christianson DO [facility]  Cardiovascular Medicine  [address], [address]  Available through call or text on [employer] TEAMs  Office: [phone]

## 2023-04-24 NOTE — PROGRESS NOTE ADULT - SUBJECTIVE AND OBJECTIVE BOX
77yPatient is a 77y old  Female who presents with a chief complaint of Sent in from [facility] for worsening RIGHT hip pain (24 Apr 2023 16:48)      Interval history:  Afebrile, continues to complain of pain and inability to walk.      Allergies:   No Known Allergies    Antimicrobials:      REVIEW OF SYSTEMS:  No chest pain   No abdominal pain  No rash.       Vital Signs Last 24 Hrs  T(C): 37.1 (04-24-23 @ 21:13), Max: 37.1 (04-24-23 @ 21:13)  T(F): 98.8 (04-24-23 @ 21:13), Max: 98.8 (04-24-23 @ 21:13)  HR: 91 (04-24-23 @ 21:13) (73 - 91)  BP: 148/62 (04-24-23 @ 21:13) (128/50 - 148/62)  BP(mean): --  RR: 18 (04-24-23 @ 21:13) (18 - 18)  SpO2: 91% (04-24-23 @ 21:13) (87% - 98%)      PHYSICAL EXAM:  Pt in no acute distress, alert, awake. sitting in bed  breathing comfortably on NC.   non distended abdomen  Rt LE edema with chronic skin changes and erythema.   no phlebitis   + PICC                            9.9    5.18  )-----------( 186      ( 23 Apr 2023 06:14 )             31.8   04-23    138  |  100  |  43<H>  ----------------------------<  104<H>  4.7   |  29  |  0.89    Ca    8.9      23 Apr 2023 06:14

## 2023-04-24 NOTE — CHART NOTE - NSCHARTNOTEFT_GEN_A_CORE
Discussed with the PICC team, okay to use current PICC line if X-ray is performed.     Called daughter 2x, left VM, did not .
ID asked to stop Statin in order to start Daptomycin as there is an interaction with Statin therapy and Daptomycin therapy.  I spoke with Dr. Christianson, Cardiology and he agrees to stop the Statin.    A/P:  Atorvastatin stopped
Spoke to daughter, updated on management. Daughter would like the ortho team to follow up with her (Brittni [phone]), despite explaining orthopedics recommendations myself. Will have ortho team follow up with daughter as she is concerned the patient has difficulty ambulating.
Spoke to the daughter, Brittni, updated her on management. Daughter is not able to clarify if she had a follow up with a cardiologist. In addition, she states she is concerned for the R hip pain and ongoing infection. I explained we are currently treated with antibiotics, involved ID and there is no surgical intervention at this time. Family would like to speak to orthopedics, messaged orthopedics to reach out to family.
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    Current out- patient pain regimen: Tylenol #4 w/ codeine (60 mg), Gabapentin 400 mg TID, also on Ambien, Clonazepam and Diphenoxylate-atropine per  prescribed by PCP Dr. Sethi, at rehab was taking Percocet 5.  Out Patient Pain Management provider: PCP Dr. Navdeep Montez    Seen in consultation yesterday, is poor historian, reports chronic right hip pain, states Morphine doesn't help and PO does nothing, felt more effect with her out-pt Tylenol w/ Codeine. Pain is sharp, constant. Speech tangential, requiring frequent redirection. Explained to pt need to discontinue Morphine 2/2 renal function, risks of toxicity, and pt reporting no effect. Pt then repeatedly shouted I want Morphine, don't stop the morphine, I don't care about my kidneys, etc. Would not listen to reason or risks or explanation that we can give safer, more effective doses of Oxycodone or Dilaudid, pt shouting that she doesn't want oxycodone.  Current pain scores 9/10 down to 4/10.    Pt insisting on changing the PO dilaudid today to percocet, despite not wanting oxycodone yesterday.  Discontinue Dilaudid 2 mg PO.  Start percocet 5/325 Q 6 hours PRN pain.  Continue Gabapentin 400 mg every 8 hours.  Lidoderm to right hip daily.  Monitor for sedation, respiratory depression.  Continue bowel regimen.  PT/ OOB per primary team.    Narcan Rescue Kit on discharge (Naloxone 4 mg/0.1 ml nasal spray - 1 spray q 2-3 minutes alternating between nostrils).    Signing off.      Chronic Pain Service  [phone]
77 F, PMHx revision of a RIGHT USAMA in April 2019, second revision of RIGHT hip Sept 2019, HTN, chronic anxiety disorder, chronic sleep disorder, recent hospitalization at [facility] 3 weeks ago for MRSA bacteremia, ?chronic OM right hip.    Current out- patient pain regimen: Tylenol #4 w/ codeine (60 mg), Gabapentin 400 mg TID, also on Ambien, Clonazepam and Diphenoxylate-atropine per  prescribed by PCP Dr. Sethi, at rehab was taking Percocet 5.  Out Patient Pain Management provider: PCP Dr. Navdeep Montez    Called back for recs, pt stating her pain is not controlled.  Is a poor historian, reports chronic right hip pain, states Morphine doesn't help and PO does nothing, felt more effect with her out-pt Tylenol w/ Codeine. Pain is sharp, constant. Speech tangential, requiring frequent redirection. Explained to pt need to discontinue Morphine 2/2 renal function, risks of toxicity, and pt reporting no effect. Pt then repeatedly shouted I want Morphine, don't stop the morphine, I don't care about my kidneys, etc. Currently on PO dilaudid 2 mg Q 8 hours PRN and percocet 5/325 Q 6 hours PRN, and is staggering doses.  Current pain scores 9/10 down to 0/10.    Pt insisting on changing the PO dilaudid today to percocet, despite not wanting oxycodone yesterday.  Discontinue dilaudid 2 mg PO.  Discontinue percocet 5/325.  Start Oxy IR 10 mg Q 6 hours PRN.  Continue Gabapentin 400 mg every 8 hours- current CrCl is 40.9 and max daily dose is 1400 mg.  Lidoderm to right hip daily.  Monitor for sedation, respiratory depression.  Continue bowel regimen.  PT/ OOB per primary team.    Narcan Rescue Kit on discharge (Naloxone 4 mg/0.1 ml nasal spray - 1 spray q 2-3 minutes alternating between nostrils).      Chronic Pain Service  [phone].
Chart reviewed, full consult to follow in AM..
Surgery Post op Note    Procedure: RLE diagnostic angiogram    Subjective: Patient seen and evaluated at the bedside. Not endorsing any pain from procedure, has chronic R hip pain from joint replacement. Tolerating diet, comfortable.       Objective:   Vital Signs Last 24 Hrs  T(C): 36.7 (19 Apr 2023 22:00), Max: 36.9 (19 Apr 2023 06:00)  T(F): 98.1 (19 Apr 2023 22:00), Max: 98.5 (19 Apr 2023 06:00)  HR: 72 (19 Apr 2023 23:00) (61 - 72)  BP: 111/51 (19 Apr 2023 23:00) (90/43 - 132/65)  BP(mean): 73 (19 Apr 2023 23:00) (60 - 73)  RR: 14 (19 Apr 2023 23:00) (14 - 18)  SpO2: 100% (19 Apr 2023 23:00) (94% - 100%)    Parameters below as of 19 Apr 2023 23:00  Patient On (Oxygen Delivery Method): nasal cannula  O2 Flow (L/min): 2    I&O's Summary    18 Apr 2023 07:01  -  19 Apr 2023 07:00  --------------------------------------------------------  IN: 600 mL / OUT: 1500 mL / NET: -900 mL    19 Apr 2023 07:01  -  19 Apr 2023 23:52  --------------------------------------------------------  IN: 480 mL / OUT: 0 mL / NET: 480 mL      I&O's Detail    18 Apr 2023 07:01  -  19 Apr 2023 07:00  --------------------------------------------------------  IN:    Oral Fluid: 600 mL  Total IN: 600 mL    OUT:    Voided (mL): 1500 mL  Total OUT: 1500 mL    Total NET: -900 mL      19 Apr 2023 07:01  -  19 Apr 2023 23:52  --------------------------------------------------------  IN:    Oral Fluid: 480 mL  Total IN: 480 mL    OUT:  Total OUT: 0 mL    Total NET: 480 mL      Labs:                        11.0   5.42  )-----------( 180      ( 19 Apr 2023 04:12 )             35.0     04-19    138  |  99  |  60<H>  ----------------------------<  104<H>  5.2   |  30  |  0.89    Ca    9.3      19 Apr 2023 04:12  Phos  5.4     04-19  Mg     2.2     04-19      PT/INR - ( 19 Apr 2023 04:12 )   PT: 12.8 sec;   INR: 1.11 ratio         PTT - ( 19 Apr 2023 04:12 )  PTT:31.9 sec      MEDICATIONS  (STANDING):    MEDICATIONS  (PRN):  ondansetron Injectable 4 milliGRAM(s) IV Push once PRN Nausea and/or Vomiting      Physical Exam:  General: well developed, well nourished, NAD  HEENT: ncat, trachea midline  Respiratory: respirations non labored  Gastrointestinal: soft, nontender, nondistended  Groin: L groin soft, compressible. Dressing c/d/i  Extremities: Palpable R PT, aquacel dressing on heel. Palpable L DP & PT      Assessment/Plan: 77y Female  s/p RLE diagnostic angiogram 4/19  - Diet: regular diet  - Activity- OOB with assistance  - Labs: am labs  - Pain medication as needed   - DVT ppx  - care per primary team     Vascular Surgery, [phone]

## 2023-04-25 LAB
HCT VFR BLD CALC: 33.1 % — LOW (ref 34.5–45)
HGB BLD-MCNC: 10.6 G/DL — LOW (ref 11.5–15.5)
MCHC RBC-ENTMCNC: 31.2 PG — SIGNIFICANT CHANGE UP (ref 27–34)
MCHC RBC-ENTMCNC: 32 GM/DL — SIGNIFICANT CHANGE UP (ref 32–36)
MCV RBC AUTO: 97.4 FL — SIGNIFICANT CHANGE UP (ref 80–100)
NRBC # BLD: 0 /100 WBCS — SIGNIFICANT CHANGE UP (ref 0–0)
PLATELET # BLD AUTO: 256 K/UL — SIGNIFICANT CHANGE UP (ref 150–400)
RBC # BLD: 3.4 M/UL — LOW (ref 3.8–5.2)
RBC # FLD: 14.6 % — HIGH (ref 10.3–14.5)
SARS-COV-2 RNA SPEC QL NAA+PROBE: SIGNIFICANT CHANGE UP
VANCOMYCIN FLD-MCNC: 22 UG/ML — SIGNIFICANT CHANGE UP
WBC # BLD: 10.91 K/UL — HIGH (ref 3.8–10.5)
WBC # FLD AUTO: 10.91 K/UL — HIGH (ref 3.8–10.5)

## 2023-04-25 PROCEDURE — 99232 SBSQ HOSP IP/OBS MODERATE 35: CPT

## 2023-04-25 RX ADMIN — ENOXAPARIN SODIUM 40 MILLIGRAM(S): 100 INJECTION SUBCUTANEOUS at 09:30

## 2023-04-25 RX ADMIN — Medication 1 TABLET(S): at 13:09

## 2023-04-25 RX ADMIN — Medication 30 MILLIGRAM(S): at 05:07

## 2023-04-25 RX ADMIN — GABAPENTIN 400 MILLIGRAM(S): 400 CAPSULE ORAL at 22:45

## 2023-04-25 RX ADMIN — MIRTAZAPINE 7.5 MILLIGRAM(S): 45 TABLET, ORALLY DISINTEGRATING ORAL at 22:45

## 2023-04-25 RX ADMIN — Medication 500 MILLIGRAM(S): at 13:09

## 2023-04-25 RX ADMIN — Medication 0.5 MILLIGRAM(S): at 18:16

## 2023-04-25 RX ADMIN — GABAPENTIN 400 MILLIGRAM(S): 400 CAPSULE ORAL at 06:07

## 2023-04-25 RX ADMIN — Medication 1 APPLICATION(S): at 13:10

## 2023-04-25 RX ADMIN — Medication 81 MILLIGRAM(S): at 13:09

## 2023-04-25 RX ADMIN — Medication 100 MILLIGRAM(S): at 22:45

## 2023-04-25 RX ADMIN — Medication 40 MILLIGRAM(S): at 05:07

## 2023-04-25 RX ADMIN — GABAPENTIN 400 MILLIGRAM(S): 400 CAPSULE ORAL at 13:09

## 2023-04-25 RX ADMIN — LOSARTAN POTASSIUM 100 MILLIGRAM(S): 100 TABLET, FILM COATED ORAL at 05:08

## 2023-04-25 RX ADMIN — CHLORHEXIDINE GLUCONATE 1 APPLICATION(S): 213 SOLUTION TOPICAL at 13:10

## 2023-04-25 RX ADMIN — Medication 0.5 MILLIGRAM(S): at 09:27

## 2023-04-25 RX ADMIN — Medication 100 MILLIGRAM(S): at 05:07

## 2023-04-25 RX ADMIN — Medication 5 MILLIGRAM(S): at 22:46

## 2023-04-25 RX ADMIN — Medication 100 MILLIGRAM(S): at 13:09

## 2023-04-25 NOTE — PROGRESS NOTE ADULT - PROBLEM SELECTOR PLAN 2
- Has PICC line from [facility]. (OSH blood culture +MRSA 3/17, started abx 3/18, and aspiration culture MRSA+ 3/22)  - TTE negative for vegetations  - ID following, recs appreciated  - Plan for 8 weeks until 5/14  - discussed with ID Attending Dr. Montoya, given difficulty with variability of her vanco troughs and likely would need q48h dosing with dose adjustments, decision made to change to daptomycin  - c/w daptomycin 400mg IVPB q24h to complete abx course through 5/14  - has R PICC line in place already that we confirmed is funcitioning - Has PICC line from [facility]. (OSH blood culture +MRSA 3/17, started abx 3/18, and aspiration culture MRSA+ 3/22)  - TTE negative for vegetations  - ID following, recs appreciated  - Plan for 8 weeks until 5/14  - discussed with ID Attending Dr. Montoya, given difficulty with variability of her vanco troughs and likely would need q48h dosing with dose adjustments, decision made to change to daptomycin  - c/w daptomycin 400mg IVPB q24h to complete abx course through 5/14  - will need weekly CBC, CMP and CPK  - has R PICC line in place already that we confirmed is functioning

## 2023-04-25 NOTE — PROGRESS NOTE ADULT - SUBJECTIVE AND OBJECTIVE BOX
Duyen Cazares MD  Division of Hospital Medicine  [facility]   Available on Microsoft Teams (Mon-Fri 8am-5pm)    * messages preferred prior to calls  Other Times:  [phone]      Patient is a 77y old  Female who presents with a chief complaint of Sent in from [facility] for worsening RIGHT hip pain (24 Apr 2023 20:08)      SUBJECTIVE / OVERNIGHT EVENTS: no acute events overnight. no fever, chills, chest pain, dyspnea, sob, cough, abd pain, n/v, nor dysuria. has large BM yesterday but after taking miralax. otherwise without complaints. reminded must not get out of bed alone/unassisted.  ADDITIONAL REVIEW OF SYSTEMS:    MEDICATIONS  (STANDING):  ascorbic acid 500 milliGRAM(s) Oral daily  aspirin  chewable 81 milliGRAM(s) Oral daily  chlorhexidine 2% Cloths 1 Application(s) Topical daily  collagenase Ointment 1 Application(s) Topical daily  enoxaparin Injectable 40 milliGRAM(s) SubCutaneous every 24 hours  furosemide    Tablet 40 milliGRAM(s) Oral daily  gabapentin 400 milliGRAM(s) Oral three times a day  hydrALAZINE 100 milliGRAM(s) Oral every 8 hours  lidocaine   4% Patch 1 Patch Transdermal daily  losartan 100 milliGRAM(s) Oral daily  melatonin 5 milliGRAM(s) Oral at bedtime  mirtazapine 7.5 milliGRAM(s) Oral at bedtime  multivitamin 1 Tablet(s) Oral daily  NIFEdipine XL 30 milliGRAM(s) Oral daily  polyethylene glycol 3350 17 Gram(s) Oral two times a day  senna 2 Tablet(s) Oral at bedtime    MEDICATIONS  (PRN):  aluminum hydroxide/magnesium hydroxide/simethicone Suspension 30 milliLiter(s) Oral every 4 hours PRN Dyspepsia  clonazePAM  Tablet 0.5 milliGRAM(s) Oral every 8 hours PRN for anxiety  ondansetron Injectable 4 milliGRAM(s) IV Push every 8 hours PRN Nausea and/or Vomiting  oxycodone    5 mG/acetaminophen 325 mG 2 Tablet(s) Oral every 6 hours PRN Severe Pain (7 - 10)  zolpidem 5 milliGRAM(s) Oral at bedtime PRN Insomnia      CAPILLARY BLOOD GLUCOSE        I&O's Summary    24 Apr 2023 07:01  -  25 Apr 2023 07:00  --------------------------------------------------------  IN: 660 mL / OUT: 700 mL / NET: -40 mL        PHYSICAL EXAM:  Vital Signs Last 24 Hrs  T(C): 36.2 (25 Apr 2023 10:55), Max: 37.1 (24 Apr 2023 21:13)  T(F): 97.1 (25 Apr 2023 10:55), Max: 98.8 (24 Apr 2023 21:13)  HR: 75 (25 Apr 2023 10:55) (75 - 91)  BP: 116/55 (25 Apr 2023 10:55) (116/55 - 148/62)  BP(mean): --  RR: 18 (25 Apr 2023 10:55) (18 - 18)  SpO2: 93% (25 Apr 2023 10:55) (87% - 98%)    Parameters below as of 25 Apr 2023 10:55  Patient On (Oxygen Delivery Method): room air    CONSTITUTIONAL: NAD, comfortable appearing  EYES: PERRLA; conjunctiva and sclera clear  ENMT: Moist oral mucosa, no pharyngeal injection or exudates; normal dentition  NECK: Supple, no palpable masses; no thyromegaly  RESPIRATORY: Normal respiratory effort; lungs are clear to auscultation bilaterally  CARDIOVASCULAR: Regular rate and rhythm, normal S1 and S2, no murmur/rub/gallop; No lower extremity edema  ABDOMEN: Soft, Nondistended, Nontender to palpation, normoactive bowel sounds  MUSCULOSKELETAL: No clubbing or cyanosis of digits; no joint swelling or tenderness to palpation  PSYCH: A+O to person, place, and time; affect appropriate, forgetful  NEUROLOGY: CN 2-12 are intact and symmetric; no gross sensory deficits   SKIN: No rashes; no palpable lesions, +R aspiration site c/d/i, +RUE PICC line c/d/i    LABS:                        10.6   10.91 )-----------( 256      ( 25 Apr 2023 07:15 )             33.1       RADIOLOGY & ADDITIONAL TESTS:  Results Reviewed: WBC wnl  Imaging Personally Reviewed:  Electrocardiogram Personally Reviewed:    COORDINATION OF CARE:  Care Discussed with Consultants/Other Providers [Y]: ID Attending Dr. Montoya, medicine NP Kenia  Prior or Outpatient Records Reviewed [Y/N]:

## 2023-04-25 NOTE — PROGRESS NOTE ADULT - NSPROGADDITIONALINFOA_GEN_ALL_CORE
.  Duyen Cazares MD  Division of Hospital Medicine  [facility]   Available on Microsoft Teams - messages preferred prior to calls.    Medically clear for discharge to [facility] pending acceptance.    Plan discussed with patient, ID Attending Dr. Montoya, and medicine NP Kenia.  Attempted to call daughter Brittni with update twice yesterday and again today but no answer. .  Duyen Cazares MD  Division of Hospital Medicine  [facility]   Available on Microsoft Teams - messages preferred prior to calls.    Medically clear for discharge to [facility] pending acceptance.    Plan discussed with patient, daughter Brittni via phone, ID Attending Dr. Montoya, and medicine NP Kenia.

## 2023-04-25 NOTE — PROGRESS NOTE ADULT - SUBJECTIVE AND OBJECTIVE BOX
DATE OF SERVICE: 04-25-23 @ 22:38    Patient is a 77y old  Female who presents with a chief complaint of Sent in from [facility] for worsening RIGHT hip pain (25 Apr 2023 12:12)      INTERVAL HISTORY: feels ok    	  MEDICATIONS:  furosemide    Tablet 40 milliGRAM(s) Oral daily  hydrALAZINE 100 milliGRAM(s) Oral every 8 hours  losartan 100 milliGRAM(s) Oral daily  NIFEdipine XL 30 milliGRAM(s) Oral daily        PHYSICAL EXAM:  T(C): 36.2 (04-25-23 @ 10:55), Max: 37 (04-25-23 @ 05:03)  HR: 80 (04-25-23 @ 12:44) (75 - 80)  BP: 94/60 (04-25-23 @ 12:44) (94/60 - 121/62)  RR: 18 (04-25-23 @ 12:44) (18 - 18)  SpO2: 94% (04-25-23 @ 12:44) (93% - 94%)  Wt(kg): --  I&O's Summary    24 Apr 2023 07:01  -  25 Apr 2023 07:00  --------------------------------------------------------  IN: 660 mL / OUT: 700 mL / NET: -40 mL          Appearance: In no distress	  HEENT:    PERRL, EOMI	  Cardiovascular:  S1 S2, No JVD  Respiratory: Lungs clear to auscultation	  Gastrointestinal:  Soft, Non-tender, + BS	  Vascularature:  No edema of LE  Psychiatric: Appropriate affect   Neuro: no acute focal deficits                               10.6   10.91 )-----------( 256      ( 25 Apr 2023 07:15 )             33.1            Labs personally reviewed      ASSESSMENT/PLAN: 	    76 y/o F--history of 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 Patient denies fever, chills, rigors.  No chest pain/pressure.  NO palpitations.   Patient denies dyspnoea but with poor exercise capacity.     1. MRSA bacteremia  - Blood Cx 4/11 NGTD  - Afebrile, no leukocytosis  - TTE shows preserved EF, no evidence of vegetations    2. Chronic Diastolic Heart Failure  - CXR shows small left pleural effusion  - BNP 3206  - TTE shows preserved EF, no WMA, moderate AS  - c/w Lasix 40mg PO daily  - Apears euvolemic    3. Hypertension  - c/w losartan 100mg daily  - c/w hydralazine 100mg PO TID  - BP soft 4/20.  Nifedipine decreased to 30mg PO daily    4. DVT ppx  - c/w SQ Heparin            Domenico Christianson DO [facility]  Cardiovascular Medicine  [address], [address]  Office: [phone]  Available via Text/call on Microsoft Teams

## 2023-04-25 NOTE — PROGRESS NOTE ADULT - ASSESSMENT
78 yo female PMH of R USAMA (c/b multiple infections, and revisions with Dr. 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 Awaiting rehab placement.

## 2023-04-26 ENCOUNTER — TRANSCRIPTION ENCOUNTER (OUTPATIENT)
Age: 78
End: 2023-04-26

## 2023-04-26 VITALS
RESPIRATION RATE: 20 BRPM | DIASTOLIC BLOOD PRESSURE: 56 MMHG | SYSTOLIC BLOOD PRESSURE: 107 MMHG | OXYGEN SATURATION: 93 % | TEMPERATURE: 98 F | HEART RATE: 85 BPM

## 2023-04-26 LAB
ALBUMIN SERPL ELPH-MCNC: 3.5 G/DL — SIGNIFICANT CHANGE UP (ref 3.3–5)
ALP SERPL-CCNC: 64 U/L — SIGNIFICANT CHANGE UP (ref 40–120)
ALT FLD-CCNC: 8 U/L — LOW (ref 10–45)
ANION GAP SERPL CALC-SCNC: 11 MMOL/L — SIGNIFICANT CHANGE UP (ref 5–17)
AST SERPL-CCNC: 13 U/L — SIGNIFICANT CHANGE UP (ref 10–40)
BASOPHILS # BLD AUTO: 0.03 K/UL — SIGNIFICANT CHANGE UP (ref 0–0.2)
BASOPHILS NFR BLD AUTO: 0.4 % — SIGNIFICANT CHANGE UP (ref 0–2)
BILIRUB SERPL-MCNC: 0.4 MG/DL — SIGNIFICANT CHANGE UP (ref 0.2–1.2)
BUN SERPL-MCNC: 54 MG/DL — HIGH (ref 7–23)
CALCIUM SERPL-MCNC: 9 MG/DL — SIGNIFICANT CHANGE UP (ref 8.4–10.5)
CHLORIDE SERPL-SCNC: 98 MMOL/L — SIGNIFICANT CHANGE UP (ref 96–108)
CK SERPL-CCNC: 54 U/L — SIGNIFICANT CHANGE UP (ref 25–170)
CO2 SERPL-SCNC: 27 MMOL/L — SIGNIFICANT CHANGE UP (ref 22–31)
CREAT SERPL-MCNC: 0.97 MG/DL — SIGNIFICANT CHANGE UP (ref 0.5–1.3)
EGFR: 61 ML/MIN/1.73M2 — SIGNIFICANT CHANGE UP
EOSINOPHIL # BLD AUTO: 0.18 K/UL — SIGNIFICANT CHANGE UP (ref 0–0.5)
EOSINOPHIL NFR BLD AUTO: 2.3 % — SIGNIFICANT CHANGE UP (ref 0–6)
GLUCOSE SERPL-MCNC: 93 MG/DL — SIGNIFICANT CHANGE UP (ref 70–99)
HCT VFR BLD CALC: 32.5 % — LOW (ref 34.5–45)
HGB BLD-MCNC: 10.2 G/DL — LOW (ref 11.5–15.5)
IMM GRANULOCYTES NFR BLD AUTO: 0.4 % — SIGNIFICANT CHANGE UP (ref 0–0.9)
LYMPHOCYTES # BLD AUTO: 0.51 K/UL — LOW (ref 1–3.3)
LYMPHOCYTES # BLD AUTO: 6.6 % — LOW (ref 13–44)
MAGNESIUM SERPL-MCNC: 2.3 MG/DL — SIGNIFICANT CHANGE UP (ref 1.6–2.6)
MCHC RBC-ENTMCNC: 31.2 PG — SIGNIFICANT CHANGE UP (ref 27–34)
MCHC RBC-ENTMCNC: 31.4 GM/DL — LOW (ref 32–36)
MCV RBC AUTO: 99.4 FL — SIGNIFICANT CHANGE UP (ref 80–100)
MONOCYTES # BLD AUTO: 0.8 K/UL — SIGNIFICANT CHANGE UP (ref 0–0.9)
MONOCYTES NFR BLD AUTO: 10.3 % — SIGNIFICANT CHANGE UP (ref 2–14)
NEUTROPHILS # BLD AUTO: 6.19 K/UL — SIGNIFICANT CHANGE UP (ref 1.8–7.4)
NEUTROPHILS NFR BLD AUTO: 80 % — HIGH (ref 43–77)
NRBC # BLD: 0 /100 WBCS — SIGNIFICANT CHANGE UP (ref 0–0)
PHOSPHATE SERPL-MCNC: 4.2 MG/DL — SIGNIFICANT CHANGE UP (ref 2.5–4.5)
PLATELET # BLD AUTO: 254 K/UL — SIGNIFICANT CHANGE UP (ref 150–400)
POTASSIUM SERPL-MCNC: 4.2 MMOL/L — SIGNIFICANT CHANGE UP (ref 3.5–5.3)
POTASSIUM SERPL-SCNC: 4.2 MMOL/L — SIGNIFICANT CHANGE UP (ref 3.5–5.3)
PROT SERPL-MCNC: 7.6 G/DL — SIGNIFICANT CHANGE UP (ref 6–8.3)
RBC # BLD: 3.27 M/UL — LOW (ref 3.8–5.2)
RBC # FLD: 14.6 % — HIGH (ref 10.3–14.5)
SODIUM SERPL-SCNC: 136 MMOL/L — SIGNIFICANT CHANGE UP (ref 135–145)
WBC # BLD: 7.74 K/UL — SIGNIFICANT CHANGE UP (ref 3.8–10.5)
WBC # FLD AUTO: 7.74 K/UL — SIGNIFICANT CHANGE UP (ref 3.8–10.5)

## 2023-04-26 PROCEDURE — U0005: CPT

## 2023-04-26 PROCEDURE — 82945 GLUCOSE OTHER FLUID: CPT

## 2023-04-26 PROCEDURE — 84132 ASSAY OF SERUM POTASSIUM: CPT

## 2023-04-26 PROCEDURE — 82550 ASSAY OF CK (CPK): CPT

## 2023-04-26 PROCEDURE — 83605 ASSAY OF LACTIC ACID: CPT

## 2023-04-26 PROCEDURE — 87075 CULTR BACTERIA EXCEPT BLOOD: CPT

## 2023-04-26 PROCEDURE — 99239 HOSP IP/OBS DSCHRG MGMT >30: CPT

## 2023-04-26 PROCEDURE — 87040 BLOOD CULTURE FOR BACTERIA: CPT

## 2023-04-26 PROCEDURE — C1769: CPT

## 2023-04-26 PROCEDURE — 84295 ASSAY OF SERUM SODIUM: CPT

## 2023-04-26 PROCEDURE — 93923 UPR/LXTR ART STDY 3+ LVLS: CPT

## 2023-04-26 PROCEDURE — 73610 X-RAY EXAM OF ANKLE: CPT

## 2023-04-26 PROCEDURE — C1894: CPT

## 2023-04-26 PROCEDURE — 85025 COMPLETE CBC W/AUTO DIFF WBC: CPT

## 2023-04-26 PROCEDURE — 82962 GLUCOSE BLOOD TEST: CPT

## 2023-04-26 PROCEDURE — 99285 EMERGENCY DEPT VISIT HI MDM: CPT

## 2023-04-26 PROCEDURE — 97161 PT EVAL LOW COMPLEX 20 MIN: CPT

## 2023-04-26 PROCEDURE — G1004: CPT

## 2023-04-26 PROCEDURE — 0225U NFCT DS DNA&RNA 21 SARSCOV2: CPT

## 2023-04-26 PROCEDURE — 84484 ASSAY OF TROPONIN QUANT: CPT

## 2023-04-26 PROCEDURE — 81003 URINALYSIS AUTO W/O SCOPE: CPT

## 2023-04-26 PROCEDURE — 86900 BLOOD TYPING SEROLOGIC ABO: CPT

## 2023-04-26 PROCEDURE — C1729: CPT

## 2023-04-26 PROCEDURE — C1887: CPT

## 2023-04-26 PROCEDURE — 86850 RBC ANTIBODY SCREEN: CPT

## 2023-04-26 PROCEDURE — 82330 ASSAY OF CALCIUM: CPT

## 2023-04-26 PROCEDURE — 86803 HEPATITIS C AB TEST: CPT

## 2023-04-26 PROCEDURE — 85027 COMPLETE CBC AUTOMATED: CPT

## 2023-04-26 PROCEDURE — 89051 BODY FLUID CELL COUNT: CPT

## 2023-04-26 PROCEDURE — 76000 FLUOROSCOPY <1 HR PHYS/QHP: CPT

## 2023-04-26 PROCEDURE — 85014 HEMATOCRIT: CPT

## 2023-04-26 PROCEDURE — 84100 ASSAY OF PHOSPHORUS: CPT

## 2023-04-26 PROCEDURE — 83880 ASSAY OF NATRIURETIC PEPTIDE: CPT

## 2023-04-26 PROCEDURE — 80048 BASIC METABOLIC PNL TOTAL CA: CPT

## 2023-04-26 PROCEDURE — 83735 ASSAY OF MAGNESIUM: CPT

## 2023-04-26 PROCEDURE — 85652 RBC SED RATE AUTOMATED: CPT

## 2023-04-26 PROCEDURE — 97110 THERAPEUTIC EXERCISES: CPT

## 2023-04-26 PROCEDURE — U0003: CPT

## 2023-04-26 PROCEDURE — 94640 AIRWAY INHALATION TREATMENT: CPT

## 2023-04-26 PROCEDURE — C1760: CPT

## 2023-04-26 PROCEDURE — 82435 ASSAY OF BLOOD CHLORIDE: CPT

## 2023-04-26 PROCEDURE — 73552 X-RAY EXAM OF FEMUR 2/>: CPT

## 2023-04-26 PROCEDURE — 86140 C-REACTIVE PROTEIN: CPT

## 2023-04-26 PROCEDURE — 80202 ASSAY OF VANCOMYCIN: CPT

## 2023-04-26 PROCEDURE — 84157 ASSAY OF PROTEIN OTHER: CPT

## 2023-04-26 PROCEDURE — 82803 BLOOD GASES ANY COMBINATION: CPT

## 2023-04-26 PROCEDURE — 85610 PROTHROMBIN TIME: CPT

## 2023-04-26 PROCEDURE — 73700 CT LOWER EXTREMITY W/O DYE: CPT | Mod: ME

## 2023-04-26 PROCEDURE — 73502 X-RAY EXAM HIP UNI 2-3 VIEWS: CPT

## 2023-04-26 PROCEDURE — 82947 ASSAY GLUCOSE BLOOD QUANT: CPT

## 2023-04-26 PROCEDURE — 87640 STAPH A DNA AMP PROBE: CPT

## 2023-04-26 PROCEDURE — 86901 BLOOD TYPING SEROLOGIC RH(D): CPT

## 2023-04-26 PROCEDURE — 85018 HEMOGLOBIN: CPT

## 2023-04-26 PROCEDURE — 76377 3D RENDER W/INTRP POSTPROCES: CPT

## 2023-04-26 PROCEDURE — 93308 TTE F-UP OR LMTD: CPT

## 2023-04-26 PROCEDURE — 87070 CULTURE OTHR SPECIMN AEROBIC: CPT

## 2023-04-26 PROCEDURE — 97116 GAIT TRAINING THERAPY: CPT

## 2023-04-26 PROCEDURE — 72170 X-RAY EXAM OF PELVIS: CPT

## 2023-04-26 PROCEDURE — 87205 SMEAR GRAM STAIN: CPT

## 2023-04-26 PROCEDURE — 80053 COMPREHEN METABOLIC PANEL: CPT

## 2023-04-26 PROCEDURE — 71045 X-RAY EXAM CHEST 1 VIEW: CPT

## 2023-04-26 PROCEDURE — 36415 COLL VENOUS BLD VENIPUNCTURE: CPT

## 2023-04-26 PROCEDURE — 85730 THROMBOPLASTIN TIME PARTIAL: CPT

## 2023-04-26 PROCEDURE — 87641 MR-STAPH DNA AMP PROBE: CPT

## 2023-04-26 RX ORDER — CLONAZEPAM 1 MG
1 TABLET ORAL
Qty: 0 | Refills: 0 | DISCHARGE
Start: 2023-04-26

## 2023-04-26 RX ORDER — ASCORBIC ACID 60 MG
1 TABLET,CHEWABLE ORAL
Qty: 0 | Refills: 0 | DISCHARGE
Start: 2023-04-26

## 2023-04-26 RX ORDER — MIRTAZAPINE 45 MG/1
1 TABLET, ORALLY DISINTEGRATING ORAL
Qty: 0 | Refills: 0 | DISCHARGE
Start: 2023-04-26

## 2023-04-26 RX ORDER — HYDRALAZINE HCL 50 MG
1 TABLET ORAL
Qty: 0 | Refills: 0 | DISCHARGE
Start: 2023-04-26

## 2023-04-26 RX ORDER — LOSARTAN POTASSIUM 100 MG/1
1 TABLET, FILM COATED ORAL
Qty: 0 | Refills: 0 | DISCHARGE
Start: 2023-04-26

## 2023-04-26 RX ORDER — GABAPENTIN 400 MG/1
1 CAPSULE ORAL
Qty: 0 | Refills: 0 | DISCHARGE
Start: 2023-04-26

## 2023-04-26 RX ORDER — FUROSEMIDE 40 MG
1 TABLET ORAL
Qty: 0 | Refills: 0 | DISCHARGE
Start: 2023-04-26

## 2023-04-26 RX ORDER — COLLAGENASE CLOSTRIDIUM HIST. 250 UNIT/G
1 OINTMENT (GRAM) TOPICAL
Qty: 0 | Refills: 0 | DISCHARGE
Start: 2023-04-26

## 2023-04-26 RX ORDER — LANOLIN ALCOHOL/MO/W.PET/CERES
1 CREAM (GRAM) TOPICAL
Qty: 0 | Refills: 0 | DISCHARGE
Start: 2023-04-26

## 2023-04-26 RX ORDER — SENNA PLUS 8.6 MG/1
2 TABLET ORAL
Qty: 0 | Refills: 0 | DISCHARGE
Start: 2023-04-26

## 2023-04-26 RX ORDER — LIDOCAINE 4 G/100G
1 CREAM TOPICAL
Qty: 0 | Refills: 0 | DISCHARGE
Start: 2023-04-26

## 2023-04-26 RX ORDER — ASPIRIN/CALCIUM CARB/MAGNESIUM 324 MG
1 TABLET ORAL
Qty: 0 | Refills: 0 | DISCHARGE
Start: 2023-04-26

## 2023-04-26 RX ORDER — POLYETHYLENE GLYCOL 3350 17 G/17G
17 POWDER, FOR SOLUTION ORAL
Qty: 0 | Refills: 0 | DISCHARGE
Start: 2023-04-26

## 2023-04-26 RX ORDER — ZOLPIDEM TARTRATE 10 MG/1
1 TABLET ORAL
Qty: 0 | Refills: 0 | DISCHARGE
Start: 2023-04-26

## 2023-04-26 RX ORDER — NIFEDIPINE 30 MG
1 TABLET, EXTENDED RELEASE 24 HR ORAL
Qty: 0 | Refills: 0 | DISCHARGE
Start: 2023-04-26

## 2023-04-26 RX ORDER — ACETAMINOPHEN 500 MG
1000 TABLET ORAL ONCE
Refills: 0 | Status: COMPLETED | OUTPATIENT
Start: 2023-04-26 | End: 2023-04-26

## 2023-04-26 RX ADMIN — CHLORHEXIDINE GLUCONATE 1 APPLICATION(S): 213 SOLUTION TOPICAL at 16:57

## 2023-04-26 RX ADMIN — GABAPENTIN 400 MILLIGRAM(S): 400 CAPSULE ORAL at 06:31

## 2023-04-26 RX ADMIN — Medication 40 MILLIGRAM(S): at 06:31

## 2023-04-26 RX ADMIN — LOSARTAN POTASSIUM 100 MILLIGRAM(S): 100 TABLET, FILM COATED ORAL at 06:31

## 2023-04-26 RX ADMIN — ZOLPIDEM TARTRATE 5 MILLIGRAM(S): 10 TABLET ORAL at 01:54

## 2023-04-26 RX ADMIN — DAPTOMYCIN 116 MILLIGRAM(S): 500 INJECTION, POWDER, LYOPHILIZED, FOR SOLUTION INTRAVENOUS at 06:46

## 2023-04-26 RX ADMIN — Medication 100 MILLIGRAM(S): at 15:28

## 2023-04-26 RX ADMIN — Medication 30 MILLIGRAM(S): at 06:31

## 2023-04-26 RX ADMIN — Medication 1 TABLET(S): at 15:27

## 2023-04-26 RX ADMIN — ENOXAPARIN SODIUM 40 MILLIGRAM(S): 100 INJECTION SUBCUTANEOUS at 09:20

## 2023-04-26 RX ADMIN — Medication 100 MILLIGRAM(S): at 06:31

## 2023-04-26 RX ADMIN — Medication 1 APPLICATION(S): at 15:28

## 2023-04-26 RX ADMIN — Medication 500 MILLIGRAM(S): at 15:27

## 2023-04-26 RX ADMIN — Medication 81 MILLIGRAM(S): at 15:28

## 2023-04-26 NOTE — PROGRESS NOTE ADULT - PROBLEM SELECTOR PLAN 9
DVT ppx: lovenox    Dispo: [facility]

## 2023-04-26 NOTE — PROGRESS NOTE ADULT - NSPROGADDITIONALINFOA_GEN_ALL_CORE
.  Duyen Cazares MD  Division of Hospital Medicine  [facility]   Available on Microsoft Teams - messages preferred prior to calls.    Medically clear for discharge to [facility] pending auth.    Plan discussed with patient, daughter Brittni via phone on 4/25, and medicine NP Radha. .  Duyen Cazares MD  Division of Hospital Medicine  [facility]   Available on Microsoft Teams - messages preferred prior to calls.    Medically clear for discharge to [facility] today 4/26/23, auth obtained.  Discharge planning time spent: 42 minutes.    Plan discussed with patient, daughter Brittni via phone on 4/25, and medicine NP Radha.

## 2023-04-26 NOTE — PROGRESS NOTE ADULT - PROBLEM SELECTOR PLAN 8
mild COPD exacerbation but will. CXR: negative for consolidation  (resolved)  - c/w Duoneb therapy  - Will avoid systemic steroids for now with suspected bacteremia  - Supplemental O2 as needed
mild COPD exacerbation but will. CXR: negative for consolidation  (resolved)  - c/w Duoneb therapy  - Will avoid systemic steroids for now with suspected bacteremia  - Supplemental O2 as needed .
mild COPD exacerbation but will. CXR: negative for consolidation  (resolved)  - c/w Duoneb therapy  - Will avoid systemic steroids for now with suspected bacteremia  - Supplemental O2 as needed

## 2023-04-26 NOTE — DISCHARGE NOTE NURSING/CASE MANAGEMENT/SOCIAL WORK - NSDCFUADDAPPT_GEN_ALL_CORE_FT
Podiatry Discharge Instructions:  Follow up: Please follow up with Dr. Lasha Garcia within 1 week of discharge from the hospital, please call [phone] for appointment and discuss that you recently were seen in the hospital.  Wound Care: Please apply santyl to right lateral ankle wound followed by sterile 4x4 gauze, abd pad and kerlex daily  Weight bearing: Please weight bear as tolerated in a surgical shoe.  Antibiotics: Please continue as instructed.

## 2023-04-26 NOTE — PROGRESS NOTE ADULT - PROBLEM SELECTOR PROBLEM 1
Chronic osteomyelitis

## 2023-04-26 NOTE — PROGRESS NOTE ADULT - PROBLEM SELECTOR PLAN 7
HSQ
HSQ
on atorvastatin at home  - will need told statin therapy while on daptomycin as there is drug interaction  - atorvastatin to be resumed once completes daptomycin treatment
lovenox
lovenox
on atorvastatin at home  - will need told statin therapy while on daptomycin as there is drug interaction  - atorvastatin to be resumed once completes daptomycin treatment
on atorvastatin at home  - will need told statin therapy while on daptomycin as there is drug interaction  - atorvastatin to be resumed once completes daptomycin treatment
lovenox
lovenox

## 2023-04-26 NOTE — PROGRESS NOTE ADULT - PROBLEM SELECTOR PROBLEM 3
Peripheral artery disease
Chronic anxiety
Peripheral artery disease
Peripheral artery disease
Chronic anxiety
Peripheral artery disease
Chronic anxiety
Peripheral artery disease

## 2023-04-26 NOTE — PROGRESS NOTE ADULT - PROBLEM SELECTOR PLAN 3
[place] as above.   - C/w chronic PRN Klonopin.
- ANDREA/PVR:  Moderate bilateral lower extremity arterial flow limitation localizing to the popliteal and infrapopliteal levels, will start ASA and Atorvastatin  - vascular surgery consult appreciated  - s/p angiogram with no plan for further intervention  - c/w ASA and atorvastatin 40mg
- ANDREA/PVR:  Moderate bilateral lower extremity arterial flow limitation localizing to the popliteal and infrapopliteal levels, will start ASA and Atorvastatin  -vascular surgery recs- s/p angiogram. no further intervention  -cw asa and atorvastatin 40mg
- ANDREA/PVR:  Moderate bilateral lower extremity arterial flow limitation localizing to the popliteal and infrapopliteal levels, will start ASA and Atorvastatin  -vascular surgery recs- s/p angiogram. no further intervention  -cw asa and atorvastatin 40mg
[place].   - C/w chronic PRN Klonopin.
- ANDREA/PVR:  Moderate bilateral lower extremity arterial flow limitation localizing to the popliteal and infrapopliteal levels, will start ASA and Atorvastatin  -vascular surgery recs- s/p angiogram. no further intervention  -cw asa and atorvastatin 40mg
[place].   - C/w chronic PRN Klonopin.
[place]   - C/w chronic PRN Klonopin.
- ANDREA/PVR:  Moderate bilateral lower extremity arterial flow limitation localizing to the popliteal and infrapopliteal levels, will start ASA and Atorvastatin  - vascular surgery consult appreciated  - s/p angiogram with no plan for further intervention  - c/w ASA and atorvastatin 40mg
[place]   - C/w chronic PRN Klonopin.
- ANDREA/PVR:  Moderate bilateral lower extremity arterial flow limitation localizing to the popliteal and infrapopliteal levels, will start ASA and Atorvastatin  -vascular surgery recs- angiogram 4/19    Patient is medically optimized (low to moderate risk) for vascular angiogram
- ANDREA/PVR:  Moderate bilateral lower extremity arterial flow limitation localizing to the popliteal and infrapopliteal levels, will start ASA and Atorvastatin  -vascular surgery recs- s/p angiogram. no further intervention  -cw asa and atorvastatin 40mg
- ANDREA/PVR:  Moderate bilateral lower extremity arterial flow limitation localizing to the popliteal and infrapopliteal levels, will start ASA and Atorvastatin  -vascular surgery recs- angiogram 4/19    Patient is medically optimized (low to moderate risk) for vascular angiogram
- ANDREA/PVR:  Moderate bilateral lower extremity arterial flow limitation localizing to the popliteal and infrapopliteal levels, will start ASA and Atorvastatin  - vascular surgery consult appreciated  - s/p angiogram with no plan for further intervention  - c/w ASA and atorvastatin 40mg

## 2023-04-26 NOTE — PROGRESS NOTE ADULT - REASON FOR ADMISSION
Sent in from [facility] for worsening RIGHT hip pain

## 2023-04-26 NOTE — PROGRESS NOTE ADULT - PROBLEM SELECTOR PLAN 5
mild COPD exacerbation but will. CXR: negative for consolidation   - C/w Duoneb therapy  - Will avoid systemic steroids for now with suspected bacteremia  - Supplemental O2 as needed .
mild COPD exacerbation but will. CXR: negative for consolidation   - C/w Duoneb therapy  - Will avoid systemic steroids for now with suspected bacteremia  - Supplemental O2 as needed .
- ECHO with EF of 75%, with findings for diastolic function. CXR: small pleural effusion   - c/w lasix 40mg PO daily  - Cardiology consult, recs appreciated
- ECHO with EF of 75%, with findings for diastolic function. CXR: small pleural effusion   - c/w lasix 40mg PO daily  - Cardiology consult, recs appreciated
ECHO with EF of 75%, with findings for diastolic function. CXR: small pleural effusion   - lasix 40mg PO  - Cardiology consult, recs appreciated
mild COPD exacerbation but will. CXR: negative for consolidation   - C/w Duoneb therapy  - Will avoid systemic steroids for now with suspected bacteremia  - Supplemental O2 as needed .
mild COPD exacerbation but will. CXR: negative for consolidation   - C/w Duoneb therapy  - Will avoid systemic steroids for now with suspected bacteremia  - Supplemental O2 as needed .
ECHO with EF of 75%, with findings for diastolic function. CXR: small pleural effusion   - lasix 40mg PO  - Cardiology consult, recs appreciated
ECHO with EF of 75%, with findings for diastolic function. CXR: small pleural effusion   - lasix 40mg PO  - Cardiology consult, recs appreciated
ECHO with EF of 75%, with findings for diastolic function. CXR: small pleural effusion   - C/w lasix 20mg IVP --> will change to 40mg PO Lasix  - Cardiology consult, recs appreciated
ECHO with EF of 75%, with findings for diastolic function. CXR: small pleural effusion   - C/w lasix 20mg IVP --> will change to 40mg PO Lasix  - Cardiology consult, recs appreciated
mild COPD exacerbation but will. CXR: negative for consolidation   - C/w Duoneb therapy  - Will avoid systemic steroids for now with suspected bacteremia  - Supplemental O2 as needed .
ECHO with EF of 75%, with findings for diastolic function. CXR: small pleural effusion   - lasix 40mg PO  - Cardiology consult, recs appreciated
- ECHO with EF of 75%, with findings for diastolic function. CXR: small pleural effusion   - c/w lasix 40mg PO daily  - Cardiology consult, recs appreciated

## 2023-04-26 NOTE — PROGRESS NOTE ADULT - NS ATTEND AMEND GEN_ALL_CORE FT
Patient reports of Hematemesis-brown to black vomitus ongoing since past 2 weeks  Last EGD on January 10, 2022-LA class D esophagitis, Schreiber's esophagus, fundic gland polyps, normal stomach and duodenum  Biopsies did not identify any H pylori  Continue pantoprazole 40mg q12  Differentials include -peptic ulcer disease, Difleuy lesion    Plan for EGD tomorrow
Patient care and plan discussed and reviewed with Advanced Care Provider. Plan as outlined above edited by me to reflect our discussion.

## 2023-04-26 NOTE — PROGRESS NOTE ADULT - PROBLEM SELECTOR PLAN 6
stable.  - c/w losartan 100mg daily  - c/w hydralazine 100mg PO TID  - c/w decreased nifedipine XL 30mg daily
mild COPD exacerbation but will. CXR: negative for consolidation  (resolved)  - C/w Duoneb therapy  - Will avoid systemic steroids for now with suspected bacteremia  - Supplemental O2 as needed .
mild COPD exacerbation but will. CXR: negative for consolidation  (resolved)  - C/w Duoneb therapy  - Will avoid systemic steroids for now with suspected bacteremia  - Supplemental O2 as needed .
HSQ
stable.  - c/w losartan 100mg daily  - c/w hydralazine 100mg PO TID  - c/w decreased nifedipine XL 30mg daily
stable.  - c/w losartan 100mg daily  - c/w hydralazine 100mg PO TID  - c/w decreased nifedipine XL 30mg daily
HSQ
mild COPD exacerbation but will. CXR: negative for consolidation   - C/w Duoneb therapy  - Will avoid systemic steroids for now with suspected bacteremia  - Supplemental O2 as needed .
mild COPD exacerbation but will. CXR: negative for consolidation  (resolved)  - C/w Duoneb therapy  - Will avoid systemic steroids for now with suspected bacteremia  - Supplemental O2 as needed .
mild COPD exacerbation but will. CXR: negative for consolidation   - C/w Duoneb therapy  - Will avoid systemic steroids for now with suspected bacteremia  - Supplemental O2 as needed .
mild COPD exacerbation but will. CXR: negative for consolidation  (resolved)  - C/w Duoneb therapy  - Will avoid systemic steroids for now with suspected bacteremia  - Supplemental O2 as needed .

## 2023-04-26 NOTE — PROGRESS NOTE ADULT - SUBJECTIVE AND OBJECTIVE BOX
DATE OF SERVICE: 04-26-23 @ 15:00    Patient is a 77y old  Female who presents with a chief complaint of Sent in from [facility] for worsening RIGHT hip pain (26 Apr 2023 12:57)      INTERVAL HISTORY: No acute events overnight, reports pain well controlled     REVIEW OF SYSTEMS:  CONSTITUTIONAL: No weakness  EYES/ENT: No visual changes;  No throat pain   NECK: No pain or stiffness  RESPIRATORY: No cough, wheezing; No shortness of breath  CARDIOVASCULAR: No chest pain or palpitations  GASTROINTESTINAL: No abdominal  pain. No nausea, vomiting, or hematemesis  GENITOURINARY: No dysuria, frequency or hematuria  NEUROLOGICAL: No stroke like symptoms  SKIN: No rashes    	  MEDICATIONS:  furosemide    Tablet 40 milliGRAM(s) Oral daily  hydrALAZINE 100 milliGRAM(s) Oral every 8 hours  losartan 100 milliGRAM(s) Oral daily  NIFEdipine XL 30 milliGRAM(s) Oral daily        PHYSICAL EXAM:  T(C): 36.8 (04-26-23 @ 13:22), Max: 37.1 (04-26-23 @ 05:14)  HR: 75 (04-26-23 @ 13:22) (75 - 80)  BP: 92/49 (04-26-23 @ 13:22) (92/49 - 134/57)  RR: 18 (04-26-23 @ 13:22) (18 - 18)  SpO2: 94% (04-26-23 @ 13:22) (93% - 94%)  Wt(kg): --  I&O's Summary    26 Apr 2023 07:01  -  26 Apr 2023 15:00  --------------------------------------------------------  IN: 480 mL / OUT: 600 mL / NET: -120 mL          Appearance: In no distress	  HEENT:    PERRL, EOMI	  Cardiovascular:  S1 S2, No JVD  Respiratory: Lungs clear to auscultation	  Gastrointestinal:  Soft, Non-tender, + BS	  Vascularature:  No edema of LE  Psychiatric: Appropriate affect   Neuro: no acute focal deficits                               10.2   7.74  )-----------( 254      ( 26 Apr 2023 07:02 )             32.5     04-26    136  |  98  |  54<H>  ----------------------------<  93  4.2   |  27  |  0.97    Ca    9.0      26 Apr 2023 07:03  Phos  4.2     04-26  Mg     2.3     04-26    TPro  7.6  /  Alb  3.5  /  TBili  0.4  /  DBili  x   /  AST  13  /  ALT  8<L>  /  AlkPhos  64  04-26        Labs personally reviewed      ASSESSMENT/PLAN: 	  76 y/o F--history of 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 Patient denies fever, chills, rigors.  No chest pain/pressure.  NO palpitations.   Patient denies dyspnoea but with poor exercise capacity.     1. MRSA bacteremia  - Blood Cx 4/11 NGTD  - Afebrile, no leukocytosis  - TTE shows preserved EF, no evidence of vegetations    2. Chronic Diastolic Heart Failure  - CXR shows small left pleural effusion  - BNP 3206  - TTE shows preserved EF, no WMA, moderate AS  - c/w Lasix 40mg PO daily  - Apears euvolemic    3. Hypertension  - c/w losartan 100mg daily  - c/w hydralazine 100mg PO TID  - BP soft 4/20.  Nifedipine decreased to 30mg PO daily    4. DVT ppx  - c/w SQ Heparin        RADHA Guardado DO [facility]  Cardiovascular Medicine  [address] [place] [address], [address]  Available through call or text on [employer] TEAM[employer]  Office: [phone]

## 2023-04-26 NOTE — DISCHARGE NOTE NURSING/CASE MANAGEMENT/SOCIAL WORK - NSDCPEFALRISK_GEN_ALL_CORE
For information on Fall & Injury Prevention, visit: [email or web address] OR  [email or web address] OR  https://www.cdc.gov/steadi/patient.html

## 2023-04-26 NOTE — PROGRESS NOTE ADULT - PROBLEM SELECTOR PROBLEM 4
Chronic HFrEF (heart failure with reduced ejection fraction)
Chronic anxiety
Chronic HFrEF (heart failure with reduced ejection fraction)
Chronic HFrEF (heart failure with reduced ejection fraction)
Chronic anxiety
Chronic HFrEF (heart failure with reduced ejection fraction)
Chronic HFrEF (heart failure with reduced ejection fraction)

## 2023-04-26 NOTE — PROGRESS NOTE ADULT - PROBLEM SELECTOR PLAN 2
- Has PICC line from [facility]. (OSH blood culture +MRSA 3/17, started abx 3/18, and aspiration culture MRSA+ 3/22)  - TTE negative for vegetations  - ID following, recs appreciated  - Plan for 8 weeks until 5/14  - discussed with ID Attending Dr. Montoya, given difficulty with variability of her vanco troughs and likely would need q48h dosing with dose adjustments, decision made to change to daptomycin  - c/w daptomycin 400mg IVPB q24h to complete abx course through 5/14  - will need weekly CBC, CMP and CPK  - has R PICC line in place already that we confirmed is functioning

## 2023-04-26 NOTE — PROGRESS NOTE ADULT - PROBLEM SELECTOR PROBLEM 7
Need for prophylactic measure
HLD (hyperlipidemia)
Need for prophylactic measure
HLD (hyperlipidemia)
HLD (hyperlipidemia)

## 2023-04-26 NOTE — PROGRESS NOTE ADULT - PROBLEM SELECTOR PLAN 4
[place].   - C/w chronic PRN Klonopin.
[place]   - C/w chronic PRN Klonopin.    #Suicidal ideation  -ep of stating "I want to kill myself" when frustrated, anxious and not receiving pain meds.   -psych consulted- enhanced supervision
[place].   - C/w chronic PRN Klonopin.
[place].   - C/w chronic PRN Klonopin.    #Suicidal ideation  -ep of stating "I want to kill myself" when frustrated, anxious and not receiving pain meds.   -psych consulted- enhanced supervision
ECHO with EF of 75%, with findings for diastolic function. CXR: small pleural effusion   - C/w lasix 20mg IVP --> will change to 40mg PO Lasix  - Cardiology consult, recs appreciated
- I-STOP #[id number] confirmed  - patient on clonazepam 0.5mg BID PRN anxiety  - can increase to TID dosing, will need to hold/monitor for sedation, however  - would not increase any further than this
- [place]-STOP #[id number] confirmed  - patient on clonazepam 0.5mg BID PRN anxiety  - can increase to TID dosing, will need to hold/monitor for sedation, however
[place]   - C/w chronic PRN Klonopin.    #Suicidal ideation  -ep of stating "I want to kill myself" when frustrated, anxious and not receiving pain meds.   -psych consulted- enhanced supervision
ECHO with EF of 75%, with findings for diastolic function. CXR: small pleural effusion   - C/w lasix 20mg IVP --> will change to 40mg PO Lasix today   - Cardiology consult, recs appreciated
ECHO with EF of 75%, with findings for diastolic function. CXR: small pleural effusion   - C/w lasix 20mg IVP --> will change to 40mg PO Lasix today   - Cardiology consult, recs appreciated
ECHO with EF of 75%, with findings for diastolic function. CXR: small pleural effusion   - C/w lasix 20mg IVP   - Cardiology consult, recs appreciated
[place]   - C/w chronic PRN Klonopin.
ECHO with EF of 75%, with findings for diastolic function. CXR: small pleural effusion   - C/w lasix 20mg IVP --> will change to 40mg PO Lasix today   - Cardiology consult, recs appreciated
- I-STOP #[id number] confirmed  - patient on clonazepam 0.5mg BID PRN anxiety  - can increase to TID dosing, will need to hold/monitor for sedation, however  - would not increase any further than this

## 2023-04-26 NOTE — PROGRESS NOTE ADULT - ASSESSMENT
76 yo female PMH of R USAMA (c/b multiple infections, and revisions with Dr. 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 Awaiting rehab placement.

## 2023-04-26 NOTE — DISCHARGE NOTE NURSING/CASE MANAGEMENT/SOCIAL WORK - PATIENT PORTAL LINK FT
You can access the FollowMyHealth Patient Portal offered by [facility] by registering at the following website: [email or web address]. By joining [facility]’s FollowMyHealth portal, you will also be able to view your health information using other applications (apps) compatible with our system.

## 2023-04-26 NOTE — PROGRESS NOTE ADULT - PROBLEM SELECTOR PROBLEM 6
Need for prophylactic measure
COPD exacerbation
HTN (hypertension)
Need for prophylactic measure
Need for prophylactic measure
HTN (hypertension)
Need for prophylactic measure
HTN (hypertension)
COPD exacerbation
Need for prophylactic measure

## 2023-04-26 NOTE — PROGRESS NOTE ADULT - SUBJECTIVE AND OBJECTIVE BOX
Duyen Cazares MD  Division of Hospital Medicine  [facility]   Available on Microsoft Teams (Mon-Fri 8am-5pm)    * messages preferred prior to calls  Other Times:  [phone]      Patient is a 77y old  Female who presents with a chief complaint of Sent in from [facility] for worsening RIGHT hip pain (25 Apr 2023 16:37)      SUBJECTIVE / OVERNIGHT EVENTS: no acute events overnight. sleepy today as received ambien later in evening but otherwise doing okay. no fever, chills, cough, sob, dysuria, abd pain, n/v, nor diarrhea.   ADDITIONAL REVIEW OF SYSTEMS:    MEDICATIONS  (STANDING):  ascorbic acid 500 milliGRAM(s) Oral daily  aspirin  chewable 81 milliGRAM(s) Oral daily  chlorhexidine 2% Cloths 1 Application(s) Topical daily  collagenase Ointment 1 Application(s) Topical daily  DAPTOmycin IVPB 400 milliGRAM(s) IV Intermittent every 24 hours  enoxaparin Injectable 40 milliGRAM(s) SubCutaneous every 24 hours  furosemide    Tablet 40 milliGRAM(s) Oral daily  gabapentin 400 milliGRAM(s) Oral three times a day  hydrALAZINE 100 milliGRAM(s) Oral every 8 hours  lidocaine   4% Patch 1 Patch Transdermal daily  losartan 100 milliGRAM(s) Oral daily  melatonin 5 milliGRAM(s) Oral at bedtime  mirtazapine 7.5 milliGRAM(s) Oral at bedtime  multivitamin 1 Tablet(s) Oral daily  NIFEdipine XL 30 milliGRAM(s) Oral daily  polyethylene glycol 3350 17 Gram(s) Oral two times a day  senna 2 Tablet(s) Oral at bedtime    MEDICATIONS  (PRN):  aluminum hydroxide/magnesium hydroxide/simethicone Suspension 30 milliLiter(s) Oral every 4 hours PRN Dyspepsia  clonazePAM  Tablet 0.5 milliGRAM(s) Oral every 8 hours PRN for anxiety  ondansetron Injectable 4 milliGRAM(s) IV Push every 8 hours PRN Nausea and/or Vomiting  oxycodone    5 mG/acetaminophen 325 mG 2 Tablet(s) Oral every 6 hours PRN Severe Pain (7 - 10)  zolpidem 5 milliGRAM(s) Oral at bedtime PRN Insomnia      CAPILLARY BLOOD GLUCOSE        I&O's Summary      PHYSICAL EXAM:  Vital Signs Last 24 Hrs  T(C): 37.1 (26 Apr 2023 05:14), Max: 37.1 (26 Apr 2023 05:14)  T(F): 98.7 (26 Apr 2023 05:14), Max: 98.7 (26 Apr 2023 05:14)  HR: 75 (26 Apr 2023 05:14) (75 - 80)  BP: 116/57 (26 Apr 2023 05:14) (116/57 - 134/57)  BP(mean): --  RR: 18 (26 Apr 2023 05:14) (18 - 18)  SpO2: 93% (26 Apr 2023 05:14) (93% - 93%)    Parameters below as of 26 Apr 2023 05:14  Patient On (Oxygen Delivery Method): room air    CONSTITUTIONAL: NAD, comfortable appearing  EYES: PERRLA; conjunctiva and sclera clear  ENMT: Moist oral mucosa, no pharyngeal injection or exudates; normal dentition  NECK: Supple, no palpable masses; no thyromegaly  RESPIRATORY: Normal respiratory effort; lungs are clear to auscultation bilaterally  CARDIOVASCULAR: Regular rate and rhythm, normal S1 and S2, no murmur/rub/gallop; No lower extremity edema  ABDOMEN: Soft, Nondistended, Nontender to palpation, normoactive bowel sounds  MUSCULOSKELETAL: No clubbing or cyanosis of digits; no joint swelling or tenderness to palpation  PSYCH: A+O to person, place, and time; affect appropriate, forgetful  NEUROLOGY: CN 2-12 are intact and symmetric; no gross sensory deficits   SKIN: No rashes; no palpable lesions, +R aspiration site c/d/i, +RUE PICC line c/d/i    LABS:                        10.2   7.74  )-----------( 254      ( 26 Apr 2023 07:02 )             32.5     04-26    136  |  98  |  54<H>  ----------------------------<  93  4.2   |  27  |  0.97    Ca    9.0      26 Apr 2023 07:03  Phos  4.2     04-26  Mg     2.3     04-26    TPro  7.6  /  Alb  3.5  /  TBili  0.4  /  DBili  x   /  AST  13  /  ALT  8<L>  /  AlkPhos  64  04-26      CARDIAC MARKERS ( 26 Apr 2023 07:03 )  x     / x     / 54 U/L / x     / x          RADIOLOGY & ADDITIONAL TESTS:  Results Reviewed: WBC downtrended, Cr stable  Imaging Personally Reviewed:  Electrocardiogram Personally Reviewed:    COORDINATION OF CARE:  Care Discussed with Consultants/Other Providers [Y]: medicine CATY Garcia  Prior or Outpatient Records Reviewed [Y/N]:

## 2023-04-26 NOTE — PROGRESS NOTE ADULT - PROBLEM SELECTOR PROBLEM 2
MRSA bacteremia

## 2023-04-26 NOTE — PROGRESS NOTE ADULT - PROVIDER SPECIALTY LIST ADULT
Cardiology
Infectious Disease
Infectious Disease
Orthopedics
Orthopedics
Podiatry
Cardiology
Infectious Disease
Cardiology
Orthopedics
Infectious Disease
Podiatry
Podiatry
Vascular Surgery
Internal Medicine
Hospitalist
Internal Medicine
Hospitalist
Internal Medicine
Hospitalist

## 2023-04-26 NOTE — PROGRESS NOTE ADULT - PROBLEM SELECTOR PROBLEM 5
Chronic HFrEF (heart failure with reduced ejection fraction)
COPD exacerbation
Chronic HFrEF (heart failure with reduced ejection fraction)
COPD exacerbation
Chronic HFrEF (heart failure with reduced ejection fraction)

## 2023-04-27 RX ORDER — LOSARTAN POTASSIUM 100 MG/1
1 TABLET, FILM COATED ORAL
Refills: 0 | DISCHARGE

## 2023-04-27 RX ORDER — NIFEDIPINE 30 MG
1 TABLET, EXTENDED RELEASE 24 HR ORAL
Refills: 0 | DISCHARGE

## 2023-04-27 RX ORDER — ALBUTEROL 90 UG/1
0 AEROSOL, METERED ORAL
Refills: 0 | DISCHARGE

## 2023-04-27 RX ORDER — HYDRALAZINE HCL 50 MG
1 TABLET ORAL
Refills: 0 | DISCHARGE

## 2023-04-27 RX ORDER — NABUMETONE 750 MG
1 TABLET ORAL
Refills: 0 | DISCHARGE

## 2023-04-27 RX ORDER — CLONAZEPAM 1 MG
1 TABLET ORAL
Refills: 0 | DISCHARGE

## 2023-04-27 RX ORDER — BUTALBITAL/ASPIRIN/CAFFEINE 50-325-40
2 TABLET ORAL
Refills: 0 | DISCHARGE

## 2023-04-27 RX ORDER — MORPHINE SULFATE 50 MG/1
5 CAPSULE, EXTENDED RELEASE ORAL
Refills: 0 | DISCHARGE

## 2023-04-27 RX ORDER — RIFAMPIN 300 MG
1 CAPSULE ORAL
Refills: 0 | DISCHARGE

## 2023-04-27 RX ORDER — DAPTOMYCIN 500 MG/10ML
400 INJECTION, POWDER, LYOPHILIZED, FOR SOLUTION INTRAVENOUS
Qty: 18 | Refills: 0
Start: 2023-04-27 | End: 2023-05-14

## 2023-04-27 RX ORDER — VANCOMYCIN HCL 1 G
500 VIAL (EA) INTRAVENOUS
Refills: 0 | DISCHARGE

## 2023-04-27 RX ORDER — GABAPENTIN 400 MG/1
1 CAPSULE ORAL
Refills: 0 | DISCHARGE

## 2023-04-27 RX ORDER — RIFAMPIN 300 MG
2 CAPSULE ORAL
Refills: 0 | DISCHARGE

## 2023-04-27 RX ORDER — ZOLPIDEM TARTRATE 10 MG/1
1 TABLET ORAL
Refills: 0 | DISCHARGE

## 2023-05-16 ENCOUNTER — INPATIENT (INPATIENT)
Facility: HOSPITAL | Age: 78
LOS: 14 days | Discharge: NURSING, REHAB OR LONG-TERM CARE | End: 2023-05-31
Attending: HOSPITALIST | Admitting: HOSPITALIST
Payer: MEDICARE

## 2023-05-16 VITALS
HEIGHT: 63 IN | OXYGEN SATURATION: 94 % | RESPIRATION RATE: 14 BRPM | HEART RATE: 103 BPM | DIASTOLIC BLOOD PRESSURE: 67 MMHG | SYSTOLIC BLOOD PRESSURE: 16 MMHG | TEMPERATURE: 98 F

## 2023-05-16 DIAGNOSIS — Z96.641 PRESENCE OF RIGHT ARTIFICIAL HIP JOINT: Chronic | ICD-10-CM

## 2023-05-16 DIAGNOSIS — Z98.890 OTHER SPECIFIED POSTPROCEDURAL STATES: Chronic | ICD-10-CM

## 2023-05-16 DIAGNOSIS — Z98.89 OTHER SPECIFIED POSTPROCEDURAL STATES: Chronic | ICD-10-CM

## 2023-05-16 LAB
ALBUMIN SERPL ELPH-MCNC: 3.8 G/DL — SIGNIFICANT CHANGE UP (ref 3.3–5)
ALP SERPL-CCNC: 71 U/L — SIGNIFICANT CHANGE UP (ref 40–120)
ALT FLD-CCNC: 9 U/L — SIGNIFICANT CHANGE UP (ref 4–33)
ANION GAP SERPL CALC-SCNC: 14 MMOL/L — SIGNIFICANT CHANGE UP (ref 7–14)
AST SERPL-CCNC: 19 U/L — SIGNIFICANT CHANGE UP (ref 4–32)
BASOPHILS # BLD AUTO: 0.03 K/UL — SIGNIFICANT CHANGE UP (ref 0–0.2)
BASOPHILS NFR BLD AUTO: 0.3 % — SIGNIFICANT CHANGE UP (ref 0–2)
BILIRUB SERPL-MCNC: 0.4 MG/DL — SIGNIFICANT CHANGE UP (ref 0.2–1.2)
BLOOD GAS VENOUS COMPREHENSIVE RESULT: SIGNIFICANT CHANGE UP
BUN SERPL-MCNC: 64 MG/DL — HIGH (ref 7–23)
CALCIUM SERPL-MCNC: 9.3 MG/DL — SIGNIFICANT CHANGE UP (ref 8.4–10.5)
CHLORIDE SERPL-SCNC: 96 MMOL/L — LOW (ref 98–107)
CO2 SERPL-SCNC: 26 MMOL/L — SIGNIFICANT CHANGE UP (ref 22–31)
CREAT SERPL-MCNC: 2.06 MG/DL — HIGH (ref 0.5–1.3)
EGFR: 25 ML/MIN/1.73M2 — LOW
EOSINOPHIL # BLD AUTO: 0.03 K/UL — SIGNIFICANT CHANGE UP (ref 0–0.5)
EOSINOPHIL NFR BLD AUTO: 0.3 % — SIGNIFICANT CHANGE UP (ref 0–6)
GLUCOSE SERPL-MCNC: 111 MG/DL — HIGH (ref 70–99)
HCT VFR BLD CALC: 31.4 % — LOW (ref 34.5–45)
HGB BLD-MCNC: 10 G/DL — LOW (ref 11.5–15.5)
IANC: 7.19 K/UL — SIGNIFICANT CHANGE UP (ref 1.8–7.4)
IMM GRANULOCYTES NFR BLD AUTO: 0.5 % — SIGNIFICANT CHANGE UP (ref 0–0.9)
LYMPHOCYTES # BLD AUTO: 0.4 K/UL — LOW (ref 1–3.3)
LYMPHOCYTES # BLD AUTO: 4.6 % — LOW (ref 13–44)
MCHC RBC-ENTMCNC: 29.9 PG — SIGNIFICANT CHANGE UP (ref 27–34)
MCHC RBC-ENTMCNC: 31.8 GM/DL — LOW (ref 32–36)
MCV RBC AUTO: 94 FL — SIGNIFICANT CHANGE UP (ref 80–100)
MONOCYTES # BLD AUTO: 0.92 K/UL — HIGH (ref 0–0.9)
MONOCYTES NFR BLD AUTO: 10.7 % — SIGNIFICANT CHANGE UP (ref 2–14)
NEUTROPHILS # BLD AUTO: 7.19 K/UL — SIGNIFICANT CHANGE UP (ref 1.8–7.4)
NEUTROPHILS NFR BLD AUTO: 83.6 % — HIGH (ref 43–77)
NRBC # BLD: 0 /100 WBCS — SIGNIFICANT CHANGE UP (ref 0–0)
NRBC # FLD: 0 K/UL — SIGNIFICANT CHANGE UP (ref 0–0)
PLATELET # BLD AUTO: 208 K/UL — SIGNIFICANT CHANGE UP (ref 150–400)
POTASSIUM SERPL-MCNC: 4.4 MMOL/L — SIGNIFICANT CHANGE UP (ref 3.5–5.3)
POTASSIUM SERPL-SCNC: 4.4 MMOL/L — SIGNIFICANT CHANGE UP (ref 3.5–5.3)
PROT SERPL-MCNC: 8.5 G/DL — HIGH (ref 6–8.3)
RBC # BLD: 3.34 M/UL — LOW (ref 3.8–5.2)
RBC # FLD: 13.6 % — SIGNIFICANT CHANGE UP (ref 10.3–14.5)
SODIUM SERPL-SCNC: 136 MMOL/L — SIGNIFICANT CHANGE UP (ref 135–145)
TOXICOLOGY SCREEN, DRUGS OF ABUSE, SERUM RESULT: SIGNIFICANT CHANGE UP
TSH SERPL-MCNC: 3.86 UIU/ML — SIGNIFICANT CHANGE UP (ref 0.27–4.2)
WBC # BLD: 8.61 K/UL — SIGNIFICANT CHANGE UP (ref 3.8–10.5)
WBC # FLD AUTO: 8.61 K/UL — SIGNIFICANT CHANGE UP (ref 3.8–10.5)

## 2023-05-16 PROCEDURE — 99285 EMERGENCY DEPT VISIT HI MDM: CPT

## 2023-05-16 PROCEDURE — 71045 X-RAY EXAM CHEST 1 VIEW: CPT | Mod: 26

## 2023-05-16 PROCEDURE — 72170 X-RAY EXAM OF PELVIS: CPT | Mod: 26

## 2023-05-16 RX ORDER — NALOXONE HYDROCHLORIDE 4 MG/.1ML
0.4 SPRAY NASAL ONCE
Refills: 0 | Status: COMPLETED | OUTPATIENT
Start: 2023-05-16 | End: 2023-05-16

## 2023-05-16 RX ORDER — VANCOMYCIN HCL 1 G
1000 VIAL (EA) INTRAVENOUS ONCE
Refills: 0 | Status: DISCONTINUED | OUTPATIENT
Start: 2023-05-16 | End: 2023-05-16

## 2023-05-16 RX ORDER — ACETAMINOPHEN 500 MG
1000 TABLET ORAL ONCE
Refills: 0 | Status: COMPLETED | OUTPATIENT
Start: 2023-05-16 | End: 2023-05-16

## 2023-05-16 RX ORDER — CEFEPIME 1 G/1
2000 INJECTION, POWDER, FOR SOLUTION INTRAMUSCULAR; INTRAVENOUS ONCE
Refills: 0 | Status: COMPLETED | OUTPATIENT
Start: 2023-05-16 | End: 2023-05-16

## 2023-05-16 RX ORDER — SODIUM CHLORIDE 9 MG/ML
2000 INJECTION, SOLUTION INTRAVENOUS ONCE
Refills: 0 | Status: COMPLETED | OUTPATIENT
Start: 2023-05-16 | End: 2023-05-16

## 2023-05-16 RX ORDER — PIPERACILLIN AND TAZOBACTAM 4; .5 G/20ML; G/20ML
3.38 INJECTION, POWDER, LYOPHILIZED, FOR SOLUTION INTRAVENOUS ONCE
Refills: 0 | Status: DISCONTINUED | OUTPATIENT
Start: 2023-05-16 | End: 2023-05-16

## 2023-05-16 RX ADMIN — CEFEPIME 100 MILLIGRAM(S): 1 INJECTION, POWDER, FOR SOLUTION INTRAMUSCULAR; INTRAVENOUS at 23:37

## 2023-05-16 RX ADMIN — SODIUM CHLORIDE 2000 MILLILITER(S): 9 INJECTION, SOLUTION INTRAVENOUS at 23:36

## 2023-05-16 RX ADMIN — Medication 400 MILLIGRAM(S): at 23:37

## 2023-05-16 RX ADMIN — NALOXONE HYDROCHLORIDE 0.4 MILLIGRAM(S): 4 SPRAY NASAL at 21:42

## 2023-05-16 NOTE — ED PROVIDER NOTE - PHYSICAL EXAMINATION
A comprehensive trauma exam was performed. No hematoma or skull tenderness or deformity, no tenderness or abnormality of facial bones. No spinous process or paraspinal muscle tenderness of the cervical, lumbar or thoracic spine. No rib tenderness/crepitus. Abdomen is soft, non-tender no guarding. Pelvis is stable, + Tenderness to R hip. No other tenderness or mario deformity of upper or lower extremity, no signs of dislocation, no scaphoid tenderness. No other traumatic abnormality on comprehensive exam. A comprehensive trauma exam was performed. No hematoma or skull tenderness or deformity, no tenderness or abnormality of facial bones. No spinous process or paraspinal muscle tenderness of the cervical, lumbar or thoracic spine. No rib tenderness/crepitus. Abdomen is soft, non-tender no guarding. Pelvis is stable, + Tenderness to R hip. No other tenderness or mario deformity of upper or lower extremity, no signs of dislocation, no scaphoid tenderness. No other traumatic abnormality on comprehensive exam.    Skin: R leg with scant erythema overlying the anterior aspect of the tibia. No calf tenderness or edema bilaterally.

## 2023-05-16 NOTE — ED PROVIDER NOTE - NSICDXFAMILYHX_GEN_ALL_CORE_FT
FAMILY HISTORY:  Family history of essential hypertension    Child  Still living? No  Family history of coronary artery disease in daughter, Age at diagnosis: Age Unknown

## 2023-05-16 NOTE — ED PROVIDER NOTE - NS_BEDUNITTYPES_ED_ALL_ED
Patient notified waiting on Dr. Britton to reply to message from yesterday and will call her back today with his recommendations   TELEMETRY

## 2023-05-16 NOTE — ED PROVIDER NOTE - CARE PLAN
Principal Discharge DX:	Opioid overdose  Secondary Diagnosis:	Adult failure to thrive   1 Principal Discharge DX:	Sepsis  Secondary Diagnosis:	Adult failure to thrive  Secondary Diagnosis:	Cellulitis of right leg  Secondary Diagnosis:	Opioid overdose

## 2023-05-16 NOTE — ED ADULT TRIAGE NOTE - CHIEF COMPLAINT QUOTE
pt brought in by ambulance for possible overdose from oxycodone. pt s/p hip replacement and was d/c home from rehab facility. pt has hx of substance abuse and as per EMS, family believes pt might have taken too many. pt denies. as per EMS, pt was responsive upon arrival but enroute to hospital pt became unresponsive with respiratory depression and decrease in SP02, pt given 0.5 of narcan IV. pt awake and alert in triage.

## 2023-05-16 NOTE — ED PROVIDER NOTE - OBJECTIVE STATEMENT
78 Y/O F PMH Alcohol abuse, Anxiety, CAD (coronary artery disease), Emphysema, HLD, HTN, Migraine, MRSA Bacteremia, Seizure disorder was brought in for a possible drug overdose. Pt on arrival was alert to self and location but is somnolent. As per EMS pt was initially verbal, became increasing lethargic and was given Narcan 0.4IV with EMS. Pt had a episode while in the ED where she was only responsive to pain. Pt was given Iv Narcan and is more arousable. Spoke to the patient's family for collateral and involved SW for further colateral. Pt's  has a H/O dysphagia but states she takes "a lot" of pills, unsure of quantity and time of ingestion but pt has a H/O anxiety on Klonopin, takes Ambien for insomnia and as per pt's  was d/c'd from rehab on Friday and ahs been on opioids.

## 2023-05-16 NOTE — ED PROVIDER NOTE - NSICDXPASTMEDICALHX_GEN_ALL_CORE_FT
PAST MEDICAL HISTORY:  Alcohol abuse     Anxiety     Anxiety     CAD (coronary artery disease)     CAD (coronary artery disease)     Coronary artery disease     Elevated brain natriuretic peptide (BNP) level     Emphysema, unspecified     Essential hypertension     HTN (hypertension)     Hyperlipidemia     Hypertension     Migraine     Migraine     MRSA bacteremia     Pain of right hip joint     Seizure     Stented coronary artery

## 2023-05-16 NOTE — ED ADULT NURSE NOTE - OBJECTIVE STATEMENT
Received pt in room 17. Pt brought in by ambulance for possible overdose from oxycodone, unsure how much was taken. pt s/p hip replacement and was d/c home from rehab facility. as per triage, pt with hx of substance abuse. pt received lethargic, sleeping, nonresponsive to verbal stimuli, minimally verbal to painful stimuli. pt placed on cardiac monitor, NSR at this time. Pt placed on capnography and nasal cannula, sating 100% at this time, airway patent, no respiratory distress noted. vitals as noted. arrives with 22 gauge to the left wrist, labs sent. pt given .4mg of Narcan. remains stable at this time, safety maintained side rails up. will reassess & monitor.

## 2023-05-16 NOTE — ED PROVIDER NOTE - INTERNATIONAL TRAVEL
How Severe Is Your Skin Lesion?: moderate
Has Your Skin Lesion Been Treated?: not been treated
Is This A New Presentation, Or A Follow-Up?: Skin Lesion
How Severe Is Your Skin Lesion?: mild
No

## 2023-05-16 NOTE — ED BEHAVIORAL HEALTH NOTE - BEHAVIORAL HEALTH NOTE
Called family for collateral-spoke with son in law, Tae, [phone].  He states pt had 2 or 3 surgeries on her right hip; was at [facility] and rehab, had MRSA with Hip replacement, and was on abx at SNF, but was d/c home recently.  Son in law reports that when pt is in pain, she takes too much medication, lives in her own house, spouse lives with her.    Family reports that pt is hard to try to walk – does not really cooperate when walking.    SW called pt’s daughter, Brittni, for collateral information @ [phone].  She also states that pt was in rehab ([facility]) after MRSA for abx and was d/c this past Sunday.  Daughter reports that pt could not perform ADL’s when she came home.  Daughter reports that pt  had been prescribed klonipin and ambien, has a hx of seizure from withdrawal, so she was going to take the medication from her.  As per daughter, pt has a hx of taking too much medication; could not walk well with the walker; visiting nurse services was to be arranged after d/c, but she is not sure how pt can manage self at home.

## 2023-05-16 NOTE — ED PROVIDER NOTE - NS ED ATTENDING STATEMENT MOD
This was a shared visit with the JUSTO. I reviewed and verified the documentation and independently performed the documented:

## 2023-05-16 NOTE — ED PROVIDER NOTE - NSICDXPASTSURGICALHX_GEN_ALL_CORE_FT
PAST SURGICAL HISTORY:  History of arthroplasty of right hip     S/P bladder repair     Status post total hip replacement, right 5/21/18

## 2023-05-16 NOTE — ED PROVIDER NOTE - ATTENDING APP SHARED VISIT CONTRIBUTION OF CARE
46 Y/O M PMH Pelvic Floor Dysfunction Anxiety OCD and S/P lysis of adhesions complicated by intraabdominal abscess S/P admission and IR drainage at [facility] presents to the ED as advised by his Surgeon Dr. George El. Pt notes poor appetite, a continuance of abdominal pain and nausea. Pt denies fever chills or nightsweats. Pt states he is allergic to both oral and IV contrast. Spoke to Dr. George El who advises admission for IV hydration, pain control, antiemetic tx.

## 2023-05-16 NOTE — ED PROVIDER NOTE - CLINICAL SUMMARY MEDICAL DECISION MAKING FREE TEXT BOX
76 Y/O F PMH Alcohol abuse, Anxiety, CAD (coronary artery disease), Emphysema, HLD, HTN, Migraine, MRSA Bacteremia, Seizure disorder was brought in for a possible drug overdose. Pt on arrival was alert to self and location but is somnolent. As per EMS pt was initially verbal, became increasing lethargic and was given Narcan 0.4IV with EMS. Plan is labs to eval for anemia or electrolyte disturbance, will continue to monitor SPO2 and on telemetry, pt was given Narcan with EMS and with the ED with good effect. Pt's family with concerns at home, concerned for pt's ability to care for herself after a recent restorative rehab admission.

## 2023-05-16 NOTE — ED ADULT NURSE NOTE - NSFALLRISKINTERV_ED_ALL_ED
Assistance OOB with selected safe patient handling equipment if applicable/Communicate fall risk and risk factors to all staff, patient, and family/Encourage patient to sit up slowly, dangle for a short time, stand at bedside before walking/Orthostatic vital signs/Provide visual cue: yellow wristband, yellow gown, etc/Reinforce activity limits and safety measures with patient and family/Review medications for side effects contributing to fall risk/Toileting schedule using arm’s reach rule for commode and bathroom/Call bell, personal items and telephone in reach/Instruct patient to call for assistance before getting out of bed/chair/stretcher/Non-slip footwear applied when patient is off stretcher/[place] to call system/Physically safe environment - no spills, clutter or unnecessary equipment/Purposeful Proactive Rounding/Room/bathroom lighting operational, light cord in reach

## 2023-05-17 DIAGNOSIS — A41.9 SEPSIS, UNSPECIFIED ORGANISM: ICD-10-CM

## 2023-05-17 DIAGNOSIS — I73.9 PERIPHERAL VASCULAR DISEASE, UNSPECIFIED: ICD-10-CM

## 2023-05-17 DIAGNOSIS — N17.9 ACUTE KIDNEY FAILURE, UNSPECIFIED: ICD-10-CM

## 2023-05-17 DIAGNOSIS — D64.9 ANEMIA, UNSPECIFIED: ICD-10-CM

## 2023-05-17 DIAGNOSIS — G93.40 ENCEPHALOPATHY, UNSPECIFIED: ICD-10-CM

## 2023-05-17 DIAGNOSIS — I10 ESSENTIAL (PRIMARY) HYPERTENSION: ICD-10-CM

## 2023-05-17 DIAGNOSIS — M25.551 PAIN IN RIGHT HIP: ICD-10-CM

## 2023-05-17 DIAGNOSIS — I50.32 CHRONIC DIASTOLIC (CONGESTIVE) HEART FAILURE: ICD-10-CM

## 2023-05-17 DIAGNOSIS — F41.9 ANXIETY DISORDER, UNSPECIFIED: ICD-10-CM

## 2023-05-17 DIAGNOSIS — Z29.9 ENCOUNTER FOR PROPHYLACTIC MEASURES, UNSPECIFIED: ICD-10-CM

## 2023-05-17 PROBLEM — I25.10 ATHEROSCLEROTIC HEART DISEASE OF NATIVE CORONARY ARTERY WITHOUT ANGINA PECTORIS: Chronic | Status: ACTIVE | Noted: 2023-04-12

## 2023-05-17 PROBLEM — R79.89 OTHER SPECIFIED ABNORMAL FINDINGS OF BLOOD CHEMISTRY: Chronic | Status: ACTIVE | Noted: 2023-04-12

## 2023-05-17 PROBLEM — R78.81 BACTEREMIA: Chronic | Status: ACTIVE | Noted: 2023-04-12

## 2023-05-17 LAB
A1C WITH ESTIMATED AVERAGE GLUCOSE RESULT: 5.1 % — SIGNIFICANT CHANGE UP (ref 4–5.6)
ALBUMIN SERPL ELPH-MCNC: 3.3 G/DL — SIGNIFICANT CHANGE UP (ref 3.3–5)
ALP SERPL-CCNC: 58 U/L — SIGNIFICANT CHANGE UP (ref 40–120)
ALT FLD-CCNC: 9 U/L — SIGNIFICANT CHANGE UP (ref 4–33)
ANION GAP SERPL CALC-SCNC: 10 MMOL/L — SIGNIFICANT CHANGE UP (ref 7–14)
APPEARANCE UR: CLEAR — SIGNIFICANT CHANGE UP
AST SERPL-CCNC: 29 U/L — SIGNIFICANT CHANGE UP (ref 4–32)
B PERT DNA SPEC QL NAA+PROBE: SIGNIFICANT CHANGE UP
B PERT+PARAPERT DNA PNL SPEC NAA+PROBE: SIGNIFICANT CHANGE UP
BACTERIA # UR AUTO: NEGATIVE — SIGNIFICANT CHANGE UP
BASE EXCESS BLDV CALC-SCNC: 2.3 MMOL/L — SIGNIFICANT CHANGE UP (ref -2–3)
BASOPHILS # BLD AUTO: 0.02 K/UL — SIGNIFICANT CHANGE UP (ref 0–0.2)
BASOPHILS NFR BLD AUTO: 0.3 % — SIGNIFICANT CHANGE UP (ref 0–2)
BILIRUB SERPL-MCNC: 0.3 MG/DL — SIGNIFICANT CHANGE UP (ref 0.2–1.2)
BILIRUB UR-MCNC: NEGATIVE — SIGNIFICANT CHANGE UP
BLOOD GAS VENOUS COMPREHENSIVE RESULT: SIGNIFICANT CHANGE UP
BORDETELLA PARAPERTUSSIS (RAPRVP): SIGNIFICANT CHANGE UP
BUN SERPL-MCNC: 44 MG/DL — HIGH (ref 7–23)
C PNEUM DNA SPEC QL NAA+PROBE: SIGNIFICANT CHANGE UP
CALCIUM SERPL-MCNC: 8.9 MG/DL — SIGNIFICANT CHANGE UP (ref 8.4–10.5)
CHLORIDE BLDV-SCNC: 102 MMOL/L — SIGNIFICANT CHANGE UP (ref 96–108)
CHLORIDE SERPL-SCNC: 101 MMOL/L — SIGNIFICANT CHANGE UP (ref 98–107)
CHOLEST SERPL-MCNC: 115 MG/DL — SIGNIFICANT CHANGE UP
CO2 BLDV-SCNC: 29.2 MMOL/L — HIGH (ref 22–26)
CO2 SERPL-SCNC: 27 MMOL/L — SIGNIFICANT CHANGE UP (ref 22–31)
COLOR SPEC: SIGNIFICANT CHANGE UP
CREAT SERPL-MCNC: 1.06 MG/DL — SIGNIFICANT CHANGE UP (ref 0.5–1.3)
DIFF PNL FLD: NEGATIVE — SIGNIFICANT CHANGE UP
EGFR: 55 ML/MIN/1.73M2 — LOW
EOSINOPHIL # BLD AUTO: 0.05 K/UL — SIGNIFICANT CHANGE UP (ref 0–0.5)
EOSINOPHIL NFR BLD AUTO: 0.7 % — SIGNIFICANT CHANGE UP (ref 0–6)
EPI CELLS # UR: 0 /HPF — SIGNIFICANT CHANGE UP (ref 0–5)
ESTIMATED AVERAGE GLUCOSE: 100 — SIGNIFICANT CHANGE UP
FERRITIN SERPL-MCNC: 176 NG/ML — HIGH (ref 15–150)
FLUAV SUBTYP SPEC NAA+PROBE: SIGNIFICANT CHANGE UP
FLUBV RNA SPEC QL NAA+PROBE: SIGNIFICANT CHANGE UP
GAS PNL BLDV: 135 MMOL/L — LOW (ref 136–145)
GAS PNL BLDV: SIGNIFICANT CHANGE UP
GLUCOSE BLDV-MCNC: 145 MG/DL — HIGH (ref 70–99)
GLUCOSE SERPL-MCNC: 118 MG/DL — HIGH (ref 70–99)
GLUCOSE UR QL: NEGATIVE — SIGNIFICANT CHANGE UP
HADV DNA SPEC QL NAA+PROBE: SIGNIFICANT CHANGE UP
HCO3 BLDV-SCNC: 28 MMOL/L — SIGNIFICANT CHANGE UP (ref 22–29)
HCOV 229E RNA SPEC QL NAA+PROBE: SIGNIFICANT CHANGE UP
HCOV HKU1 RNA SPEC QL NAA+PROBE: SIGNIFICANT CHANGE UP
HCOV NL63 RNA SPEC QL NAA+PROBE: SIGNIFICANT CHANGE UP
HCOV OC43 RNA SPEC QL NAA+PROBE: SIGNIFICANT CHANGE UP
HCT VFR BLD CALC: 26.1 % — LOW (ref 34.5–45)
HCT VFR BLDA CALC: 27 % — LOW (ref 34.5–46.5)
HDLC SERPL-MCNC: 47 MG/DL — LOW
HGB BLD CALC-MCNC: 8.9 G/DL — LOW (ref 11.7–16.1)
HGB BLD-MCNC: 8.2 G/DL — LOW (ref 11.5–15.5)
HMPV RNA SPEC QL NAA+PROBE: SIGNIFICANT CHANGE UP
HPIV1 RNA SPEC QL NAA+PROBE: SIGNIFICANT CHANGE UP
HPIV2 RNA SPEC QL NAA+PROBE: SIGNIFICANT CHANGE UP
HPIV3 RNA SPEC QL NAA+PROBE: SIGNIFICANT CHANGE UP
HPIV4 RNA SPEC QL NAA+PROBE: SIGNIFICANT CHANGE UP
HYALINE CASTS # UR AUTO: 5 /LPF — SIGNIFICANT CHANGE UP (ref 0–7)
IANC: 5.48 K/UL — SIGNIFICANT CHANGE UP (ref 1.8–7.4)
IMM GRANULOCYTES NFR BLD AUTO: 0.4 % — SIGNIFICANT CHANGE UP (ref 0–0.9)
IRON SATN MFR SERPL: 18 UG/DL — LOW (ref 30–160)
IRON SATN MFR SERPL: 8 % — LOW (ref 14–50)
KETONES UR-MCNC: NEGATIVE — SIGNIFICANT CHANGE UP
LACTATE BLDV-MCNC: 0.8 MMOL/L — SIGNIFICANT CHANGE UP (ref 0.5–2)
LEUKOCYTE ESTERASE UR-ACNC: NEGATIVE — SIGNIFICANT CHANGE UP
LIPID PNL WITH DIRECT LDL SERPL: 57 MG/DL — SIGNIFICANT CHANGE UP
LYMPHOCYTES # BLD AUTO: 0.57 K/UL — LOW (ref 1–3.3)
LYMPHOCYTES # BLD AUTO: 8.2 % — LOW (ref 13–44)
M PNEUMO DNA SPEC QL NAA+PROBE: SIGNIFICANT CHANGE UP
MAGNESIUM SERPL-MCNC: 1.9 MG/DL — SIGNIFICANT CHANGE UP (ref 1.6–2.6)
MCHC RBC-ENTMCNC: 29.9 PG — SIGNIFICANT CHANGE UP (ref 27–34)
MCHC RBC-ENTMCNC: 31.4 GM/DL — LOW (ref 32–36)
MCV RBC AUTO: 95.3 FL — SIGNIFICANT CHANGE UP (ref 80–100)
MONOCYTES # BLD AUTO: 0.77 K/UL — SIGNIFICANT CHANGE UP (ref 0–0.9)
MONOCYTES NFR BLD AUTO: 11.1 % — SIGNIFICANT CHANGE UP (ref 2–14)
NEUTROPHILS # BLD AUTO: 5.48 K/UL — SIGNIFICANT CHANGE UP (ref 1.8–7.4)
NEUTROPHILS NFR BLD AUTO: 79.3 % — HIGH (ref 43–77)
NITRITE UR-MCNC: NEGATIVE — SIGNIFICANT CHANGE UP
NON HDL CHOLESTEROL: 68 MG/DL — SIGNIFICANT CHANGE UP
NRBC # BLD: 0 /100 WBCS — SIGNIFICANT CHANGE UP (ref 0–0)
NRBC # FLD: 0 K/UL — SIGNIFICANT CHANGE UP (ref 0–0)
PCO2 BLDV: 47 MMHG — SIGNIFICANT CHANGE UP (ref 39–52)
PCP SPEC-MCNC: SIGNIFICANT CHANGE UP
PH BLDV: 7.38 — SIGNIFICANT CHANGE UP (ref 7.32–7.43)
PH UR: 6 — SIGNIFICANT CHANGE UP (ref 5–8)
PHOSPHATE SERPL-MCNC: 3.1 MG/DL — SIGNIFICANT CHANGE UP (ref 2.5–4.5)
PLATELET # BLD AUTO: 173 K/UL — SIGNIFICANT CHANGE UP (ref 150–400)
PO2 BLDV: 80 MMHG — HIGH (ref 25–45)
POTASSIUM BLDV-SCNC: 4.1 MMOL/L — SIGNIFICANT CHANGE UP (ref 3.5–5.1)
POTASSIUM SERPL-MCNC: 4 MMOL/L — SIGNIFICANT CHANGE UP (ref 3.5–5.3)
POTASSIUM SERPL-SCNC: 4 MMOL/L — SIGNIFICANT CHANGE UP (ref 3.5–5.3)
PROCALCITONIN SERPL-MCNC: 0.25 NG/ML — HIGH (ref 0.02–0.1)
PROT SERPL-MCNC: 7.3 G/DL — SIGNIFICANT CHANGE UP (ref 6–8.3)
PROT UR-MCNC: ABNORMAL
RAPID RVP RESULT: SIGNIFICANT CHANGE UP
RBC # BLD: 2.74 M/UL — LOW (ref 3.8–5.2)
RBC # FLD: 13.5 % — SIGNIFICANT CHANGE UP (ref 10.3–14.5)
RBC CASTS # UR COMP ASSIST: 1 /HPF — SIGNIFICANT CHANGE UP (ref 0–4)
RSV RNA SPEC QL NAA+PROBE: SIGNIFICANT CHANGE UP
RV+EV RNA SPEC QL NAA+PROBE: SIGNIFICANT CHANGE UP
SAO2 % BLDV: 94.3 % — HIGH (ref 67–88)
SARS-COV-2 RNA SPEC QL NAA+PROBE: SIGNIFICANT CHANGE UP
SODIUM SERPL-SCNC: 138 MMOL/L — SIGNIFICANT CHANGE UP (ref 135–145)
SP GR SPEC: 1.01 — SIGNIFICANT CHANGE UP (ref 1.01–1.05)
TIBC SERPL-MCNC: 221 UG/DL — SIGNIFICANT CHANGE UP (ref 220–430)
TRIGL SERPL-MCNC: 56 MG/DL — SIGNIFICANT CHANGE UP
UIBC SERPL-MCNC: 203 UG/DL — SIGNIFICANT CHANGE UP (ref 110–370)
UROBILINOGEN FLD QL: SIGNIFICANT CHANGE UP
WBC # BLD: 6.92 K/UL — SIGNIFICANT CHANGE UP (ref 3.8–10.5)
WBC # FLD AUTO: 6.92 K/UL — SIGNIFICANT CHANGE UP (ref 3.8–10.5)
WBC UR QL: 0 /HPF — SIGNIFICANT CHANGE UP (ref 0–5)

## 2023-05-17 PROCEDURE — 70450 CT HEAD/BRAIN W/O DYE: CPT | Mod: 26

## 2023-05-17 PROCEDURE — 99223 1ST HOSP IP/OBS HIGH 75: CPT

## 2023-05-17 PROCEDURE — 12345: CPT | Mod: NC

## 2023-05-17 RX ORDER — SODIUM CHLORIDE 9 MG/ML
1000 INJECTION, SOLUTION INTRAVENOUS
Refills: 0 | Status: DISCONTINUED | OUTPATIENT
Start: 2023-05-17 | End: 2023-05-17

## 2023-05-17 RX ORDER — ALBUTEROL 90 UG/1
0 AEROSOL, METERED ORAL
Qty: 0 | Refills: 0 | DISCHARGE

## 2023-05-17 RX ORDER — LISINOPRIL 2.5 MG/1
0 TABLET ORAL
Qty: 0 | Refills: 3 | DISCHARGE

## 2023-05-17 RX ORDER — CEFEPIME 1 G/1
2000 INJECTION, POWDER, FOR SOLUTION INTRAMUSCULAR; INTRAVENOUS EVERY 12 HOURS
Refills: 0 | Status: DISCONTINUED | OUTPATIENT
Start: 2023-05-17 | End: 2023-05-18

## 2023-05-17 RX ORDER — DAPTOMYCIN 500 MG/10ML
300 INJECTION, POWDER, LYOPHILIZED, FOR SOLUTION INTRAVENOUS
Refills: 0 | Status: DISCONTINUED | OUTPATIENT
Start: 2023-05-17 | End: 2023-05-18

## 2023-05-17 RX ORDER — OXYCODONE HYDROCHLORIDE 5 MG/1
10 TABLET ORAL EVERY 6 HOURS
Refills: 0 | Status: DISCONTINUED | OUTPATIENT
Start: 2023-05-17 | End: 2023-05-20

## 2023-05-17 RX ORDER — NIFEDIPINE 30 MG
1 TABLET, EXTENDED RELEASE 24 HR ORAL
Qty: 0 | Refills: 0 | DISCHARGE

## 2023-05-17 RX ORDER — AMLODIPINE BESYLATE 2.5 MG/1
1 TABLET ORAL
Qty: 0 | Refills: 0 | DISCHARGE

## 2023-05-17 RX ORDER — AMLODIPINE BESYLATE 2.5 MG/1
0 TABLET ORAL
Qty: 0 | Refills: 3 | DISCHARGE

## 2023-05-17 RX ORDER — SODIUM CHLORIDE 9 MG/ML
1000 INJECTION INTRAMUSCULAR; INTRAVENOUS; SUBCUTANEOUS
Refills: 0 | Status: DISCONTINUED | OUTPATIENT
Start: 2023-05-17 | End: 2023-05-18

## 2023-05-17 RX ORDER — CLONAZEPAM 1 MG
0.5 TABLET ORAL ONCE
Refills: 0 | Status: DISCONTINUED | OUTPATIENT
Start: 2023-05-17 | End: 2023-05-17

## 2023-05-17 RX ORDER — HEPARIN SODIUM 5000 [USP'U]/ML
5000 INJECTION INTRAVENOUS; SUBCUTANEOUS EVERY 8 HOURS
Refills: 0 | Status: DISCONTINUED | OUTPATIENT
Start: 2023-05-17 | End: 2023-05-18

## 2023-05-17 RX ORDER — CLONAZEPAM 1 MG
0.5 TABLET ORAL EVERY 12 HOURS
Refills: 0 | Status: DISCONTINUED | OUTPATIENT
Start: 2023-05-17 | End: 2023-05-23

## 2023-05-17 RX ORDER — LOSARTAN POTASSIUM 100 MG/1
0 TABLET, FILM COATED ORAL
Qty: 0 | Refills: 0 | DISCHARGE

## 2023-05-17 RX ORDER — FUROSEMIDE 40 MG
0 TABLET ORAL
Qty: 0 | Refills: 0 | DISCHARGE

## 2023-05-17 RX ORDER — OXYCODONE HYDROCHLORIDE 5 MG/1
5 TABLET ORAL EVERY 6 HOURS
Refills: 0 | Status: DISCONTINUED | OUTPATIENT
Start: 2023-05-17 | End: 2023-05-20

## 2023-05-17 RX ORDER — NALOXONE HYDROCHLORIDE 4 MG/.1ML
0.4 SPRAY NASAL ONCE
Refills: 0 | Status: COMPLETED | OUTPATIENT
Start: 2023-05-17 | End: 2023-05-17

## 2023-05-17 RX ORDER — HYDROMORPHONE HYDROCHLORIDE 2 MG/ML
0.25 INJECTION INTRAMUSCULAR; INTRAVENOUS; SUBCUTANEOUS ONCE
Refills: 0 | Status: DISCONTINUED | OUTPATIENT
Start: 2023-05-17 | End: 2023-05-17

## 2023-05-17 RX ORDER — ACETAMINOPHEN 500 MG
1000 TABLET ORAL ONCE
Refills: 0 | Status: DISCONTINUED | OUTPATIENT
Start: 2023-05-17 | End: 2023-05-17

## 2023-05-17 RX ORDER — ACETAMINOPHEN 500 MG
1000 TABLET ORAL ONCE
Refills: 0 | Status: COMPLETED | OUTPATIENT
Start: 2023-05-17 | End: 2023-05-17

## 2023-05-17 RX ORDER — VANCOMYCIN HCL 1 G
1000 VIAL (EA) INTRAVENOUS ONCE
Refills: 0 | Status: COMPLETED | OUTPATIENT
Start: 2023-05-17 | End: 2023-05-17

## 2023-05-17 RX ORDER — DIAZEPAM 5 MG
0 TABLET ORAL
Qty: 0 | Refills: 0 | DISCHARGE

## 2023-05-17 RX ORDER — IPRATROPIUM/ALBUTEROL SULFATE 18-103MCG
3 AEROSOL WITH ADAPTER (GRAM) INHALATION EVERY 6 HOURS
Refills: 0 | Status: DISCONTINUED | OUTPATIENT
Start: 2023-05-17 | End: 2023-05-31

## 2023-05-17 RX ORDER — NALOXONE HYDROCHLORIDE 4 MG/.1ML
4 SPRAY NASAL
Qty: 0 | Refills: 0 | DISCHARGE

## 2023-05-17 RX ORDER — LISINOPRIL 2.5 MG/1
1 TABLET ORAL
Qty: 0 | Refills: 0 | DISCHARGE

## 2023-05-17 RX ORDER — ACETAMINOPHEN 500 MG
2 TABLET ORAL
Refills: 0 | DISCHARGE

## 2023-05-17 RX ADMIN — Medication 1000 MILLIGRAM(S): at 00:38

## 2023-05-17 RX ADMIN — CEFEPIME 100 MILLIGRAM(S): 1 INJECTION, POWDER, FOR SOLUTION INTRAMUSCULAR; INTRAVENOUS at 12:45

## 2023-05-17 RX ADMIN — OXYCODONE HYDROCHLORIDE 10 MILLIGRAM(S): 5 TABLET ORAL at 22:17

## 2023-05-17 RX ADMIN — SODIUM CHLORIDE 1000 MILLILITER(S): 9 INJECTION, SOLUTION INTRAVENOUS at 02:09

## 2023-05-17 RX ADMIN — NALOXONE HYDROCHLORIDE 0.4 MILLIGRAM(S): 4 SPRAY NASAL at 02:04

## 2023-05-17 RX ADMIN — SODIUM CHLORIDE 75 MILLILITER(S): 9 INJECTION INTRAMUSCULAR; INTRAVENOUS; SUBCUTANEOUS at 20:31

## 2023-05-17 RX ADMIN — HYDROMORPHONE HYDROCHLORIDE 0.25 MILLIGRAM(S): 2 INJECTION INTRAMUSCULAR; INTRAVENOUS; SUBCUTANEOUS at 18:36

## 2023-05-17 RX ADMIN — OXYCODONE HYDROCHLORIDE 10 MILLIGRAM(S): 5 TABLET ORAL at 13:15

## 2023-05-17 RX ADMIN — OXYCODONE HYDROCHLORIDE 10 MILLIGRAM(S): 5 TABLET ORAL at 22:47

## 2023-05-17 RX ADMIN — HYDROMORPHONE HYDROCHLORIDE 0.25 MILLIGRAM(S): 2 INJECTION INTRAMUSCULAR; INTRAVENOUS; SUBCUTANEOUS at 18:06

## 2023-05-17 RX ADMIN — CEFEPIME 100 MILLIGRAM(S): 1 INJECTION, POWDER, FOR SOLUTION INTRAMUSCULAR; INTRAVENOUS at 22:20

## 2023-05-17 RX ADMIN — OXYCODONE HYDROCHLORIDE 10 MILLIGRAM(S): 5 TABLET ORAL at 13:45

## 2023-05-17 RX ADMIN — DAPTOMYCIN 112 MILLIGRAM(S): 500 INJECTION, POWDER, LYOPHILIZED, FOR SOLUTION INTRAVENOUS at 18:06

## 2023-05-17 RX ADMIN — Medication 250 MILLIGRAM(S): at 10:32

## 2023-05-17 RX ADMIN — SODIUM CHLORIDE 1000 MILLILITER(S): 9 INJECTION, SOLUTION INTRAVENOUS at 11:43

## 2023-05-17 RX ADMIN — Medication 0.5 MILLIGRAM(S): at 06:39

## 2023-05-17 RX ADMIN — Medication 0.5 MILLIGRAM(S): at 19:57

## 2023-05-17 NOTE — H&P ADULT - PROBLEM SELECTOR PLAN 5
Pt w/ hx of anxiety, takes clonazepam 0.5 q8 and zolpidem 5mg qhs, remeron  - will continue w/ home med to avoid withdrawal Pt w/ hx of anxiety, takes clonazepam 0.5 q8 and zolpidem 5mg qhs, remeron  - Pt w/ prior hx of withdrawal from benzo, will place on CIWA for now. Hold home meds

## 2023-05-17 NOTE — H&P ADULT - PROBLEM SELECTOR PLAN 8
Pt w/ hx of HTN, on home losartan, nifedipine, labetalol and hydralazine. Confirm meds w/ family in AM  - home i/s/o sepsis Pt w/ hx of HTN, on home losartan, nifedipine, and hydralazine. Confirm meds w/ family in AM  - hold i/s/o sepsis

## 2023-05-17 NOTE — H&P ADULT - NSHPPHYSICALEXAM_GEN_ALL_CORE
.  VITAL SIGNS:  T(C): 37.2 (05-17-23 @ 01:24), Max: 39.3 (05-16-23 @ 23:03)  T(F): 99 (05-17-23 @ 01:24), Max: 102.7 (05-16-23 @ 23:03)  HR: 87 (05-17-23 @ 01:24) (87 - 103)  BP: 92/42 (05-17-23 @ 01:24) (16/67 - 112/99)  BP(mean): --  RR: 20 (05-17-23 @ 01:24) (14 - 25)  SpO2: 97% (05-17-23 @ 01:24) (94% - 99%)  Wt(kg): --    PHYSICAL EXAM:    Constitutional: NAD; somnolent, arousable  Head: NC/AT  Eyes: pupils sluggish reactive to light, anicteric sclera  ENT: no nasal discharge; dry MM  Neck: supple; no JVD  Respiratory: CTA B/L; no W/R/R  Cardiac: systolic ejection murmur, RRR  Gastrointestinal: soft, NT/ND; no rebound or guarding  Extremities: WWP, no clubbing or cyanosis; no peripheral edema.  Musculoskeletal: no joint swelling, tenderness or erythema  Vascular: 2+ radial, DP/PT pulses B/L  Dermatologic: skin warm, dry  Lymphatic: no submandibular or cervical LAD  Neurologic: AAOx0-1; pt unable to participate in neuro exam  Psychiatric: unable to assess .  VITAL SIGNS:  T(C): 37.2 (05-17-23 @ 01:24), Max: 39.3 (05-16-23 @ 23:03)  T(F): 99 (05-17-23 @ 01:24), Max: 102.7 (05-16-23 @ 23:03)  HR: 87 (05-17-23 @ 01:24) (87 - 103)  BP: 92/42 (05-17-23 @ 01:24) (16/67 - 112/99)  BP(mean): --  RR: 20 (05-17-23 @ 01:24) (14 - 25)  SpO2: 97% (05-17-23 @ 01:24) (94% - 99%)  Wt(kg): --    PHYSICAL EXAM:    Constitutional: NAD; somnolent, arousable  Head: NC/AT  Eyes: pupils sluggish reactive to light, anicteric sclera  ENT: no nasal discharge; dry MM  Neck: supple; no JVD  Respiratory: CTA B/L; no W/R/R  Cardiac: systolic ejection murmur, RRR  Gastrointestinal: soft, NT/ND; no rebound or guarding  Extremities: WWP, no clubbing or cyanosis; no peripheral edema.  Musculoskeletal: RLE erythema, no warmth  Vascular: 2+ radial, DP/PT pulses B/L  Dermatologic: skin warm, dry  Lymphatic: no submandibular or cervical LAD  Neurologic: AAOx0-1; pt unable to participate in neuro exam  Psychiatric: unable to assess

## 2023-05-17 NOTE — PROGRESS NOTE ADULT - PROBLEM SELECTOR PLAN 9
-Diagnosed w/ PAD during last admission  -ANDREA/PVR:  Moderate bilateral lower extremity arterial flow limitation localizing to the popliteal and infrapopliteal levels  -s/p angio; no plan for surgical intervention per vascular during last admission  > on ASA and atorvastatin 40 mg  > can hold home meds given mental status

## 2023-05-17 NOTE — PROGRESS NOTE ADULT - PROBLEM SELECTOR PLAN 8
Pt w/ hx of HTN, on home losartan, nifedipine, and hydralazine. Confirm meds w/ family in AM  - hold i/s/o sepsis

## 2023-05-17 NOTE — PROGRESS NOTE ADULT - SUBJECTIVE AND OBJECTIVE BOX
Jarod Diaz  PGY-1 Resident Physician     Patient is a 77y old  Female who presents with a chief complaint of AMS (17 May 2023 02:07)      SUBJECTIVE / OVERNIGHT EVENTS:  A&O x 4. Endorsing pain in foot.     MEDICATIONS  (STANDING):  albuterol/ipratropium for Nebulization 3 milliLiter(s) Nebulizer every 6 hours  cefepime   IVPB 2000 milliGRAM(s) IV Intermittent every 12 hours  clonazePAM  Tablet 0.5 milliGRAM(s) Oral every 12 hours  DAPTOmycin IVPB 300 milliGRAM(s) IV Intermittent every 48 hours  heparin   Injectable 5000 Unit(s) SubCutaneous every 8 hours  lactated ringers. 1000 milliLiter(s) (1000 mL/Hr) IV Continuous <Continuous>    MEDICATIONS  (PRN):  oxyCODONE    IR 5 milliGRAM(s) Oral every 6 hours PRN Moderate Pain (4 - 6)  oxyCODONE    IR 10 milliGRAM(s) Oral every 6 hours PRN Severe Pain (7 - 10)    Allergies    No Known Allergies    Intolerances        Vital Signs Last 24 Hrs  T(C): 36.9 (17 May 2023 10:30), Max: 39.3 (16 May 2023 23:03)  T(F): 98.4 (17 May 2023 10:30), Max: 102.7 (16 May 2023 23:03)  HR: 91 (17 May 2023 13:02) (75 - 109)  BP: 118/64 (17 May 2023 13:02) (16/67 - 118/64)  BP(mean): --  RR: 20 (17 May 2023 13:02) (14 - 25)  SpO2: 100% (17 May 2023 13:02) (94% - 100%)    Parameters below as of 17 May 2023 13:02  Patient On (Oxygen Delivery Method): nasal cannula  O2 Flow (L/min): 2    Daily Height in cm: 160.02 (16 May 2023 20:09)    Daily   I&O's Summary      Physical Exam    Constitutional: NAD; somnolent, arousable  Head: NC/AT  Eyes: pupils sluggish reactive to light, anicteric sclera  ENT: no nasal discharge; dry MM  Neck: supple; no JVD  Respiratory: CTA B/L; no W/R/R  Cardiac: systolic ejection murmur, RRR  Gastrointestinal: soft, NT/ND; no rebound or guarding  Extremities: WWP, no clubbing or cyanosis; no peripheral edema.  Musculoskeletal: RLE erythema, no warmth  Vascular: 2+ radial, DP/PT pulses B/L  Dermatologic: skin warm, dry  Lymphatic: no submandibular or cervical LAD  Neurologic: AAOx0-1; pt unable to participate in neuro exam  Psychiatric: unable to assess  DIAGNOSTICS:                         10.0   8.61  )-----------( 208      ( 16 May 2023 20:40 )             31.4     Hgb Trend: 10.0<--  05-16    136  |  96<L>  |  64<H>  ----------------------------<  111<H>  4.4   |  26  |  2.06<H>    Ca    9.3      16 May 2023 20:40    TPro  8.5<H>  /  Alb  3.8  /  TBili  0.4  /  DBili  x   /  AST  19  /  ALT  9   /  AlkPhos  71  -16    CAPILLARY BLOOD GLUCOSE        Creatinine Trend: 2.06<--, 0.97<--, 0.89<--, 0.92<--, 0.82<--, 0.91<--  LIVER FUNCTIONS - ( 16 May 2023 20:40 )  Alb: 3.8 g/dL / Pro: 8.5 g/dL / ALK PHOS: 71 U/L / ALT: 9 U/L / AST: 19 U/L / GGT: x                 Urinalysis Basic - ( 17 May 2023 00:30 )    Color: Light Yellow / Appearance: Clear / S.014 / pH: x  Gluc: x / Ketone: Negative  / Bili: Negative / Urobili: <2 mg/dL   Blood: x / Protein: Trace / Nitrite: Negative   Leuk Esterase: Negative / RBC: 1 /HPF / WBC 0 /HPF   Sq Epi: x / Non Sq Epi: x / Bacteria: Negative

## 2023-05-17 NOTE — H&P ADULT - HISTORY OF PRESENT ILLNESS
76 Y/O F PMH Alcohol abuse, Anxiety, CAD (coronary artery disease), Emphysema, HLD, HTN, Migraine, MRSA Bacteremia, Seizure disorder was brought in for change in mental status. Hx obtained from chart review; limited hx from  from phone as  w/ hx of CVA    Pt was recently admitted to [facility] for R hip pain; has had multiple hip surgeries in the past. During the admission, c/f recurrence of MRSA R hip chronic OM based on R hip aspiration. Pt was managed with daptomycin to complete at rehab until 5/14. Pt was discharged from home recently and unable to walk or perform any ADLs. Pt also talking less than usual. Per chart review, pt has a habit of taking too much medication. She is on klonipin and ambien at homes, also on opioids.     On evaluation, pt arousable but somnolent. Denies any complaints. Spoke w/ covering nurse who notified provider she received pt like this. Protecting airway.     Unable to obtain any further hx from pt or . Attempted to call daughter, but unable to reach. Unable to confirm current meds.     EMS gave narcan x1. In the ED, pt only responsive to pain. Given another narcan and became more arousable. Pt also w/ fever and tachycardia. C/f sepsis, given 2L bolus and started on broad spectrum abx. Admitted to medicine for further management 78 Y/O F PMH Alcohol abuse, Anxiety, CAD (coronary artery disease), Emphysema, HLD, HTN, Migraine, MRSA Bacteremia, Seizure disorder was brought in for change in mental status. Hx obtained from chart review; limited hx from  from phone as  w/ hx of CVA    Pt was recently admitted to [facility] for R hip pain; has had multiple hip surgeries in the past. During the admission, c/f recurrence of MRSA R hip chronic OM based on R hip aspiration. Pt was managed with daptomycin to complete at rehab until 5/14. Pt was discharged to home recently but pt unable to walk or perform any ADLs. Pt also talking less than usual. Per chart review, pt has a habit of taking too much medication. She is on klonipin and ambien at home, also on opioids.     On evaluation, pt arousable, but somnolent. Denies any complaints. Spoke w/ covering nurse who notified provider she received pt like this. Protecting airway.     Unable to obtain any further hx from pt or . Attempted to call daughter, but unable to reach. Unable to confirm current meds.     ED course:  EMS gave narcan x1. In the ED, pt only responsive to pain. Given another narcan and became more arousable. Pt also w/ fever and tachycardia. C/f sepsis, given 2L bolus and started on broad spectrum abx. Admitted to medicine for further management

## 2023-05-17 NOTE — ED ADULT NURSE REASSESSMENT NOTE - NS ED NURSE REASSESS COMMENT FT1
Pt admitted to telemetry. Pending inpatient bed assignment.
Report received from WILLIE Carney. Pt A&Ox4, respirations equal and unlabored. Pt agitated in stretcher. Found to have a fever. Medicated as per EMR orders. NSR to sinus tachycardiac on cardiac monitor. Blood cultures drawn and sent. 20G IV placed to L hand. 20G IV placed to R forearm. Safety maintained. Bed in lowest position, side rails raised, call bell in reach.
Straight catheterization for urine specimen obtained using sterile technique. Pt tolerated procedure well. 1000cc of clear yellow urine obtained.

## 2023-05-17 NOTE — H&P ADULT - PROBLEM SELECTOR PLAN 3
Pt presenting w/ LAI on admission likely i/s/o sepsis, Cr. 2.06.   - s/p 2L in ED, now receiving 1L additional LR  - f/u repeat BMP in AM  - trend qd  - avoid nephrotoxic agents  - renally dose meds

## 2023-05-17 NOTE — H&P ADULT - ATTENDING COMMENTS
I agree with the above H&P from ACP/Resident/Intern. I have personally seen and examined the patient.  I fully participated in the care of this patient.  I have made amendments to the documentation where necessary, and agree with the history, physical exam, and plan as documented by the Resident.  In addition:  HPI: 76 Y/O F PMH Alcohol abuse, Anxiety, CAD (coronary artery disease), Emphysema, HLD, HTN, Migraine, MRSA Bacteremia, Seizure disorder was brought in for change in mental status. Hx obtained from chart review; Unable to reach other family members at this time.    Pt was recently admitted to [facility] for R hip pain; has had multiple hip surgeries in the past. During the [facility] admission, she had recurrence of MRSA R hip chronic OM based on R hip aspiration however unable to locate record of MRSA culture. Pt was managed with daptomycin to complete at rehab until 5/14. Pt was discharged to home recently but pt unable to walk or perform any ADLs. Pt also talking less than usual. Per chart review, pt has a habit of taking too much medication. She is on klonipin and ambien at home, also on opioids.     On evaluation, pt arousable, but somnolent. Wakes up to sternal rub but falls back to sleep again. Protecting airway.    ED course:  EMS gave narcan x1. In the ED, pt only responsive to pain. Given another narcan and became more arousable. Pt also w/ fever and tachycardia. C/f sepsis, given 2L bolus and started on broad spectrum abx.    Labs: Reviewed  Imaging: Reviewed  EKG: Reviewed    PHYSICAL EXAM:  GENERAL: NAD, comfortable appearing, somnolent but protecting airways  CHEST/LUNG: Clear to auscultation bilaterally; No wheezes, rales or rhonchi  HEART: Regular rate and rhythm; No murmurs, rubs, or gallops, (+)S1, S2  ABDOMEN: Soft, Nontender, Nondistended; Normal Bowel sounds   EXTREMITIES:  2+ Peripheral Pulses, No clubbing, cyanosis, or edema  Neuro: Wakes up with sternal rub but falls back to asleep, pupils pinpoint bilaterally,, reactive but sluggish    A/P: Overall, she is admitted for encephalopathy. Ddx include septic vs polypharmacy. Tox following. Currently protecting airways but somnolent. Will treat with vanc/cefepime for now  until cultures come back. Will need to get CTH to r/o intracranial abnormalities. May also need ortho to evaluate for reinfected OM. ID consult in AM regarding possible source of sepsis.  Repeat labs after IVF.

## 2023-05-17 NOTE — PROGRESS NOTE ADULT - PROBLEM SELECTOR PLAN 7
-Pt w/ hx of diastolic HF w/ mild pulm HTN, on lasix 40mg  -Will hold at this time given sepsis  -Echo (4/22): EF 75% w/ stage 2 diastolic function  > h/o home furosemide

## 2023-05-17 NOTE — H&P ADULT - PROBLEM SELECTOR PLAN 9
Diagnosed w/ PAD during last admission.  ANDREA/PVR:  Moderate bilateral lower extremity arterial flow limitation localizing to the popliteal and infrapopliteal levels. s/p angio; no plan for surgical intervention per vascular during last admission  - c/w ASA and atorvastatin 40mg. Diagnosed w/ PAD during last admission.  ANDREA/PVR:  Moderate bilateral lower extremity arterial flow limitation localizing to the popliteal and infrapopliteal levels. s/p angio; no plan for surgical intervention per vascular during last admission, on ASA and atorvastatin 40mg.  - can hold home meds given mental status

## 2023-05-17 NOTE — H&P ADULT - PROBLEM SELECTOR PLAN 7
Pt w/ hx of diastolic HF w/ mild pulm HTN, on lasix 40mg  - Will hold at this time given sepsis  - Monitor I&S

## 2023-05-17 NOTE — H&P ADULT - ASSESSMENT
76 Y/O F PMH Alcohol abuse, Anxiety, CAD (coronary artery disease), Emphysema, HLD, HTN, Migraine, hx of MRSA Bacteremia, Seizure disorder was brought in for change in mental status. Concern for sepsis, complicated by septic encephalopathy vs drug induced encephalopathy. Admitted to medicine for further evaluation

## 2023-05-17 NOTE — H&P ADULT - PROBLEM SELECTOR PLAN 4
Pt w/ normocytic anemia hgb 10; appears chronic and stable.  - Will send iron studies  - trend daily

## 2023-05-17 NOTE — PROGRESS NOTE ADULT - PROBLEM SELECTOR PLAN 6
-Pt w/ hx of anxiety, takes clonazepam 0.5 q8 and zolpidem 5mg qhs, remeron  -s/p CIWA bundle  > c/w home clonazepam .5 q8

## 2023-05-17 NOTE — PROGRESS NOTE ADULT - PROBLEM SELECTOR PLAN 2
-LAI on admission likely i/s/o sepsis, Cr. 2.06.   -s/p 3L in ED  > trend qd  > avoid nephrotoxic agents, renally dose meds

## 2023-05-17 NOTE — H&P ADULT - PROBLEM SELECTOR PLAN 1
Pt presenting w/ AMS. Arousable, but somnolent on presentation. S/p narcan x2 with some improvement. C/f septic encephalopathy vs drug induced encephalopathy vs withdrawal encephalopathy ETOH. Pt recently discharged from rehab few days ago and per family, no alcohol use making ETOH lower in differential. Pt on Lomotil based on I-STOP; Atropine overdose to consider given hyperthermia, tachycardia, dry MM, and urinary retention; however, difficult to differentiate from sepsis    - Spoke w/ tox fellow overnight, primary presentation likely not 2/2 drug-induced encephalopathy, but likely contributing. Advised against treating for atropine overdose. Also advised to hold further narcan as pt RR stable and not retaining.   -- c/w home benzodiazepine given prior withdrawal hx and withdrawal will complicate picture  - infectious work-up below  - fall precautions, seizure precaution, aspiration precaution  - Will obtain CTH to r/o structural abnormality Pt presenting w/ AMS. Arousable, but somnolent on presentation. S/p narcan x2 with some improvement. C/f septic encephalopathy vs drug induced encephalopathy vs ETOH withdrawal. Pt recently discharged from rehab few days ago and per family, no alcohol use making ETOH lower in differential. Pt on Lomotil based on I-STOP; Atropine overdose to consider given hyperthermia, tachycardia, dry MM, and urinary retention; however, difficult to differentiate from sepsis. Pt also on benzo and opioids    - Spoke w/ tox fellow overnight, primary presentation likely not 2/2 drug-induced encephalopathy, but likely contributing. Advised against treating for atropine overdose. Also advised to hold further narcan as pt RR stable and pt not retaining  on blood gas.   -- c/w home benzodiazepine given prior withdrawal hx  - infectious work-up below  - fall precautions, seizure precaution, aspiration precaution  - Will obtain CTH to r/o structural abnormality On admission, pt w/ fever to 102 and tachycardia. Exam c/f RLE cellulitis. 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 CXR clear and UA unremarkable.   - s/p 2L in ED; give additional 1L  - c/w broad spectrum abx w/ cefepime and vanco  - f/u blood cx  - f/u xray of R leg On admission, pt w/ fever to 102 and tachycardia. Exam c/f RLE cellulitis. 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 CXR clear and UA unremarkable.   - s/p 2L in ED; give additional 1L  - c/w broad spectrum abx w/ cefepime and vanco  - f/u blood cx  - f/u xray of R leg  -Consider ID and ortho consult in AM regarding possible source of sepsis after blood culture comes back

## 2023-05-17 NOTE — PROGRESS NOTE ADULT - ASSESSMENT
77 year old with history of chronic osteomyelitis of the right hip with prior MRSA infection/ bacteremia. She presented with altered mental status    On exam, there is warmth and induration over the right hip.  She also has pain with movement of the right ankle    Empiric daptomycin and cefepime pending blood cultures    I would reimage the right leg    Orthopedic surgery eval of chronic infection fo right prosthetic joint and new right ankle pain    Case and plan d/w hospitalist

## 2023-05-17 NOTE — PROVIDER CONTACT NOTE (MEDICATION) - ASSESSMENT
a/ox1-2 not to situation, BLE pain 10/10, refuses am labs and current meds at this time. received oxy at this time.

## 2023-05-17 NOTE — PROGRESS NOTE ADULT - ATTENDING COMMENTS
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  Seen by ID, will c/w cefepime and change abx to dapto (check CK) as previous admission, obtain imaging and ortho consult.   BP has stabilized after IVF, hold all BM meds for now, c/w IVF  Cards to see. 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   New LAI, f/u [facility], need repeat labs, pt was refusing in ER.  BP has stabilized after IVF, hold all BM meds for now, c/w IVF  Cards to see.

## 2023-05-17 NOTE — PROGRESS NOTE ADULT - PROBLEM SELECTOR PLAN 1
-On admission, pt w/ fever to 102 and tachycardia  -Exam c/f RLE cellulitis. Also hx of R hip OM 2/2 MRSA  -CXR clear and UA unremarkable.   -s/p 2L in ED; give additional 1L  -s/p broad spectrum abx w/ cefepime and vanco  > f/u blood cx  > f/u CT of right leg & hip d/t concern for progressive cellulitis   > ID c/s     > will transition from vanc to daptomycin (5/17-) d/t concern on prior admission of inadequate vanc levels     > c/w cefepime (5/17- )

## 2023-05-17 NOTE — PROVIDER CONTACT NOTE (MEDICATION) - SITUATION
Attending at bedside. Pt refuses AM labs and current meds at this time. Screaming leg pain - pt received oxycodone 10mg at this time

## 2023-05-17 NOTE — H&P ADULT - NSHPLABSRESULTS_GEN_ALL_CORE
.  LABS:                         10.0   8.61  )-----------( 208      ( 16 May 2023 20:40 )             31.4         136  |  96<L>  |  64<H>  ----------------------------<  111<H>  4.4   |  26  |  2.06<H>    Ca    9.3      16 May 2023 20:40    TPro  8.5<H>  /  Alb  3.8  /  TBili  0.4  /  DBili  x   /  AST  19  /  ALT  9   /  AlkPhos  71        Urinalysis Basic - ( 17 May 2023 00:30 )    Color: Light Yellow / Appearance: Clear / S.014 / pH: x  Gluc: x / Ketone: Negative  / Bili: Negative / Urobili: <2 mg/dL   Blood: x / Protein: Trace / Nitrite: Negative   Leuk Esterase: Negative / RBC: 1 /HPF / WBC 0 /HPF   Sq Epi: x / Non Sq Epi: x / Bacteria: Negative                RADIOLOGY, EKG & ADDITIONAL TESTS: Reviewed.     EKG: Sinus tach    < from: Xray Chest 1 View- PORTABLE-Urgent (Xray Chest 1 View- PORTABLE-Urgent .) (23 @ 22:00) >    Impression:  Clear lungs.    < end of copied text > .  LABS:                         10.0   8.61  )-----------( 208      ( 16 May 2023 20:40 )             31.4         136  |  96<L>  |  64<H>  ----------------------------<  111<H>  4.4   |  26  |  2.06<H>    Ca    9.3      16 May 2023 20:40    TPro  8.5<H>  /  Alb  3.8  /  TBili  0.4  /  DBili  x   /  AST  19  /  ALT  9   /  AlkPhos  71        Urinalysis Basic - ( 17 May 2023 00:30 )    Color: Light Yellow / Appearance: Clear / S.014 / pH: x  Gluc: x / Ketone: Negative  / Bili: Negative / Urobili: <2 mg/dL   Blood: x / Protein: Trace / Nitrite: Negative   Leuk Esterase: Negative / RBC: 1 /HPF / WBC 0 /HPF   Sq Epi: x / Non Sq Epi: x / Bacteria: Negative      RADIOLOGY, EKG & ADDITIONAL TESTS: Reviewed.     EKG: Sinus tach    < from: Xray Chest 1 View- PORTABLE-Urgent (Xray Chest 1 View- PORTABLE-Urgent .) (23 @ 22:00) >    Impression:  Clear lungs.    < end of copied text >

## 2023-05-17 NOTE — PROGRESS NOTE ADULT - PROBLEM SELECTOR PLAN 4
-Pt presenting w/ AMS  -Diff: C/f septic encephalopathy vs drug induced encephalopathy vs ETOH withdrawal  -Arousable, but somnolent on presentation  -S/p narcan x2 with some improvement  -Pt on Lomotil based on I-STOP  -Atropine overdose to consider given hyperthermia, tachycardia, dry MM, and urinary retention; however, difficult to differentiate from sepsis  -Pt also on benzo and opioids as seen on utox  -per ON tox fellow overnight, primary presentation likely not 2/2 drug-induced encephalopathy, but likely contributing. Advised against treating for atropine overdose. Also advised to hold further narcan as pt RR stable and pt not retaining  on blood gas  -A&O x 3 in AM   -CTH w/o abnormalities  > infectious work-up as above  > fall precautions, seizure precaution, aspiration precaution  > will h/o sedative medications

## 2023-05-17 NOTE — H&P ADULT - PROBLEM SELECTOR PLAN 2
On admission, pt w/ fever to 102 and tachycardia. Exam c/f RLE cellulitis. 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 CXR clear and UA unremarkable.   - s/p 2L in ED; give additional 1L  - c/w broad spectrum abx w/ cefepime and vanco  - f/u blood cx  - f/u xray of R leg Pt presenting w/ AMS. Arousable, but somnolent on presentation. S/p narcan x2 with some improvement. C/f septic encephalopathy vs drug induced encephalopathy vs ETOH withdrawal. Pt recently discharged from rehab few days ago and per family, no alcohol use making ETOH lower in differential. Pt on Lomotil based on I-STOP; Atropine overdose to consider given hyperthermia, tachycardia, dry MM, and urinary retention; however, difficult to differentiate from sepsis. Pt also on benzo and opioids    - Spoke w/ tox fellow overnight, primary presentation likely not 2/2 drug-induced encephalopathy, but likely contributing. Advised against treating for atropine overdose. Also advised to hold further narcan as pt RR stable and pt not retaining  on blood gas.   - infectious work-up below  - fall precautions, seizure precaution, aspiration precaution  - Will obtain CTH to r/o structural abnormality

## 2023-05-17 NOTE — PROGRESS NOTE ADULT - SUBJECTIVE AND OBJECTIVE BOX
HPI:  78 Y/O F PMH Alcohol abuse, Anxiety, CAD (coronary artery disease), Emphysema, HLD, HTN, Migraine, MRSA Bacteremia, Seizure disorder was brought in for change in mental status. Hx obtained from chart review; limited hx from  from phone as  w/ hx of CVA.     Pt was recently admitted to [facility] for R hip pain; has had multiple hip surgeries in the past. During the admission, c/f recurrence of MRSA R hip chronic OM based on R hip aspiration. Pt was managed with daptomycin to complete at rehab until . Pt was discharged to home recently but pt unable to walk or perform any ADLs. Pt also talking less than usual. Per chart review, pt has a habit of taking too much medication. She is on klonipin and ambien at home, also on opioids.     On evaluation, pt arousable, but somnolent. Denies any complaints. Spoke w/ covering nurse who notified provider she received pt like this. Protecting airway.     Unable to obtain any further hx from pt or . Attempted to call daughter, but unable to reach. Unable to confirm current meds.     Afebrile.       PAST MEDICAL & SURGICAL HISTORY:  Hypertension      Hyperlipidemia      CAD (coronary artery disease)      Migraine      Anxiety      Emphysema, unspecified      HTN (hypertension)      Anxiety      Coronary artery disease      Stented coronary artery      Seizure      Alcohol abuse      Migraine      Pain of right hip joint      MRSA bacteremia      Essential hypertension      CAD (coronary artery disease)      Elevated brain natriuretic peptide (BNP) level      S/P bladder repair      Status post total hip replacement, right  20      History of arthroplasty of right hip          Allergies    No Known Allergies    Intolerances        ANTIMICROBIALS:  cefepime   IVPB 2000 every 12 hours  DAPTOmycin IVPB 300 every 48 hours      OTHER MEDS:  acetaminophen   IVPB .. 1000 milliGRAM(s) IV Intermittent once  albuterol/ipratropium for Nebulization 3 milliLiter(s) Nebulizer every 6 hours  clonazePAM  Tablet 0.5 milliGRAM(s) Oral every 12 hours  heparin   Injectable 5000 Unit(s) SubCutaneous every 8 hours  lactated ringers. 1000 milliLiter(s) IV Continuous <Continuous>  oxyCODONE    IR 5 milliGRAM(s) Oral every 6 hours PRN  oxyCODONE    IR 10 milliGRAM(s) Oral every 6 hours PRN      SOCIAL HISTORY:  lives alone  no tobacco  h/o alcohol abuse    FAMILY HISTORY:  Family history of coronary artery disease in daughter (Child)    Family history of essential hypertension        REVIEW OF SYSTEMS  [  ] ROS unobtainable because:    [x ] All other systems negative except as noted below:	    Constitutional:  [ ] fever [ ] chills  [ ] weight loss  [ x weakness  Skin:  [ ] rash [ ] phlebitis	  Eyes: [ ] icterus [ ] pain  [ ] discharge	  ENMT: [ ] sore throat  [ ] thrush [ ] ulcers [ ] exudates  Respiratory: [ ] dyspnea [ ] hemoptysis [ ] cough [ ] sputum	  Cardiovascular:  [ ] chest pain [ ] palpitations [ ] edema	  Gastrointestinal:  [ ] nausea [ ] vomiting [ ] diarrhea [ ] constipation [ ] pain	  Genitourinary:  [ ] dysuria [ ] frequency [ ] hematuria [ ] discharge [ ] flank pain  [ ] incontinence  Musculoskeletal:  [ ] myalgias [ ] arthralgias [ ] arthritis  [ xlegin  Neurological:  [ ] headache [ ] seizures  [ ] confusion/altered mental status  Psychiatric:  [ ] anxiety [ ] depression	  Hematology/Lymphatics:  [ ] lymphadenopathy  Endocrine:  [ ] adrenal [ ] thyroid  Allergic/Immunologic:	 [ ] transplant [ ] seasonal    PHYSICAL EXAM:  General: [x] non-toxic  HEAD/EYES: [ ] PERRL [ x white sclera [ ] icterus  ENT:  [ ] normal [x] supple [ ] thrush [ ] pharyngeal exudate  Cardiovascular:   [ ] murmur x ] normal [ ] PPM/AICD  Respiratory:  [ x clear to ausculation bilaterally  GI:  [ ] soft, non-tender, normal bowel sounds  :  [ ] edmondson [ x no CVA tenderness   Musculoskeletal:  [ ] no synovitis  Neurologic:  [ ] non-focal exam   Skin:  [x] no rash  Lymph: [ ] no lymphadenopathy  Psychiatric:  [ ] appropriate affect [x] alert & oriented  Lines:  [x] no phlebitis [ ] central line          Drug Dosing Weight  Height (cm): 160 (16 May 2023 20:09)  Weight (kg): 49.9 (2023 17:05)  BMI (kg/m2): 19.5 (16 May 2023 20:09)  BSA (m2): 1.5 (16 May 2023 20:09)    Vital Signs Last 24 Hrs  T(F): 98.4 (23 @ 10:30), Max: 102.7 (23 @ 23:03)    Vital Signs Last 24 Hrs  HR: 91 (23 @ 13:02) (75 - 109)  BP: 118/64 (23 @ 13:02) (16 - 118/64)  RR: 20 (23 @ 13:02)  SpO2: 100% (23 @ 13:02) (94% - 100%)  Wt(kg): --                          10.0   8.61  )-----------( 208      ( 16 May 2023 20:40 )             31.4           136  |  96<L>  |  64<H>  ----------------------------<  111<H>  4.4   |  26  |  2.06<H>    Ca    9.3      16 May 2023 20:40    TPro  8.5<H>  /  Alb  3.8  /  TBili  0.4  /  DBili  x   /  AST  19  /  ALT  9   /  AlkPhos  71  05-16      Urinalysis Basic - ( 17 May 2023 00:30 )    Color: Light Yellow / Appearance: Clear / S.014 / pH: x  Gluc: x / Ketone: Negative  / Bili: Negative / Urobili: <2 mg/dL   Blood: x / Protein: Trace / Nitrite: Negative   Leuk Esterase: Negative / RBC: 1 /HPF / WBC 0 /HPF   Sq Epi: x / Non Sq Epi: x / Bacteria: Negative        MICROBIOLOGY:    RADIOLOGY:

## 2023-05-17 NOTE — H&P ADULT - PROBLEM SELECTOR PLAN 6
Pt w/ right hip pain chronic, ?i/s/o chronic OM. On lidocaine4%, percocet based on I-STOP. On morphine and hydromorphone based on prior records; unable to confirm med list w/ family. Concern for overdose s/p narcan x2  - hold opioids at this time  - Will need to obtain up to date list from daughter in AM

## 2023-05-17 NOTE — PROGRESS NOTE ADULT - PROBLEM SELECTOR PLAN 3
-Pt w/ right hip pain chronic i/s/o chronic OM vs acute OM .   -On lidocaine4%, percocet based on I-STOP  -On morphine and hydromorphone based on prior records; unable to confirm med list w/ family  -Concern for overdose s/p narcan x2  > f/u CT of right leg & hip d/t concern for progressive cellulitis   > pain regimen oxy 5/10 (moderate/severe)

## 2023-05-17 NOTE — CHART NOTE - NSCHARTNOTEFT_GEN_A_CORE
Search Terms: alexandra osman, 1945Search Date: 05/17/2023 01:22:08 AM  Searching on behalf of: [facility] - [facility]  The Drug Utilization Report below displays all of the controlled substance prescriptions, if any, that your patient has filled in the last twelve months. The information displayed on this report is compiled from pharmacy submissions to the Department, and accurately reflects the information as submitted by the pharmacies.    This report was requested by: Juan Pope | Reference #: [id number]    Practitioner Count: 4  Pharmacy Count: 2  Current Opioid Prescriptions: 1  Current Benzodiazepine Prescriptions: 2  Current Stimulant Prescriptions: 0      Patient Demographic Information (PDI)       PDI	First Name	Last Name	Birth Date	Gender	Street Address	[facility]  A	Alexandra Srivastava	1945	Female	[address] [place] [id number]  B	Alexandra Osman	1945	Female	[address]	[id number]  C	Alexandra Srivastava	1945	Female	[address] [facility]	[id number]  D	Alexandra Srivastava	1945	Female	[address] [facility]	[id number]    Prescription Information      PDI Filter:    PDI	Current Rx	Drug Type	Rx Written	Rx Dispensed	Drug	Quantity	Days Supply	Prescriber Name	Prescriber ZACHERY #	Payment Method	Dispenser  A	N	O	10/04/2022	02/08/2023	diphenoxylate-atropine 2.5-0.025 mg tablet	12	4	Navdeep Montez MD	[id number]	[facility]  A	N	O	10/04/2022	02/03/2023	diphenoxylate-atropine 2.5-0.025 mg tablet	12	4	Navdeep Montez MD	[id number]	[facility]  A	N	O	10/04/2022	11/05/2022	diphenoxylate-atropine 2.5-0.025 mg tablet	12	4	Navdeep Montez MD	[id number]	[facility]  A	N	O	10/04/2022	10/04/2022	diphenoxylate-atropine 2.5-0.025 mg tablet	12	4	Navdeep Montez MD	[id number]	[facility]  B	Y		05/15/2023	05/15/2023	zolpidem tartrate 5 mg tablet	30	30	Marko Pepper MD	[id number]	[facility] [employer]  B	Y	O	05/15/2023	05/15/2023	oxycodone-acetaminophen 5-325 mg tablet	60	30	Marko Pepper MD	[id number]	[facility] [employer]  B	Y	B	05/15/2023	05/15/2023	clonazepam 0.5 mg tablet	60	20	Marko Pepper MD	[id number]	[facility] [employer]  B	N	O	03/13/2023	03/13/2023	acetaminophen-cod #4 tablet	120	30	Navdeep Montez MD	[id number]	[employer]  B	N		03/06/2023	03/06/2023	zolpidem tartrate 10 mg tablet	30	30	Navdeep Montez MD	[id number]	[facility] [employer]  B	N	O	02/06/2023	03/05/2023	diphenoxylate-atropine 2.5-0.025 mg tablet	12	3	Navdeep Montez MD	[id number]	[facility] [employer]  B	N	O	02/06/2023	03/03/2023	diphenoxylate-atropine 2.5-0.025 mg tablet	12	3	Navdeep Montez MD	[id number]	[facility] [employer]  B	N	B	03/02/2023	03/02/2023	clonazepam 0.5 mg tablet	60	30	Navdeep Montez MD	[id number]	[facility] [employer]  B	N	O	02/13/2023	02/13/2023	acetaminophen-cod #4 tablet	120	30	Navdeep Montez MD	[id number]	[facility] [employer]  B	N	B	01/30/2023	02/07/2023	clonazepam 0.5 mg tablet	60	30	Navdeep Montez MD	[id number]	[facility] [employer]  B	N	O	02/06/2023	02/07/2023	diphenoxylate-atropine 2.5-0.025 mg tablet	12	3	Navdeep Montez MD	[id number]	[facility] [employer]  B	N	B	01/17/2023	01/30/2023	clonazepam 0.5 mg tablet	28	14	[facility]	[id number]	[facility] [employer]	N	O	01/11/2023	01/11/2023	acetaminophen-cod #4 tablet	120	30	Navdeep Montez MD	[id number]	[facility] [employer]  B	N	B	01/11/2023	01/11/2023	diazepam 5 mg tablet	90	30	Navdeep Montez MD	[id number]	[facility] [employer]  B	N	B	12/12/2022	12/12/2022	diazepam 5 mg tablet	90	30	Navdeep Montez MD	[id number]	[facility] [employer]  B	N	O	12/12/2022	12/12/2022	acetaminophen-cod #4 tablet	120	30	Navdeep Montez MD	[id number]	[facility] [employer]  B	N	B	11/14/2022	11/14/2022	diazepam 5 mg tablet	90	30	Navdeep Montez MD	[id number]	[facility] [employer]  B	N	O	11/14/2022	11/14/2022	acetaminophen-cod #4 tablet	120	30	Navdeep Montez MD	[id number]	[facility] [employer]  B	N	B	10/12/2022	10/12/2022	diazepam 5 mg tablet	90	30	Navdeep Montez MD	[id number]	[facility] [employer]  B	N	O	10/12/2022	10/12/2022	acetaminophen-cod #4 tablet	60	15	Navdeep Montez MD	[id number]	[facility] [employer]  B	N	O	05/16/2022	09/29/2022	diphenoxylate-atropine 2.5-0.025 mg tablet	12	3	Navdeep Montez MD	[id number]	[facility] [employer]  B	N	B	09/12/2022	09/12/2022	diazepam 5 mg tablet	90	30	Navdeep Montez MD	[id number]	[facility] [employer]  B	N	O	09/12/2022	09/12/2022	acetaminophen-cod #4 tablet	120	30	Navdeep Montez MD	[id number]	[facility] [employer]  B	N	O	05/16/2022	09/09/2022	diphenoxylate-atropine 2.5-0.025 mg tablet	12	3	Navdeep Montez MD	[id number]	[facility] [employer]  B	N	O	05/16/2022	08/26/2022	diphenoxylate-atropine 2.5-0.025 mg tablet	12	3	Navdeep Montez MD	[id number]	[facility] [employer]  B	N	O	08/15/2022	08/15/2022	acetaminophen-cod #4 tablet	120	30	Navdeep Montez MD	[id number]	[facility] [employer]  B	N	B	08/15/2022	08/15/2022	diazepam 5 mg tablet	90	30	Navdeep Montez MD	[id number]	[facility] [employer]  B	N	B	07/11/2022	07/11/2022	diazepam 5 mg tablet	90	30	Navdeep Montez MD	[id number]	[facility] [employer]  B	N	O	07/11/2022	07/11/2022	acetaminophen-cod #4 tablet	120	30	Navdeep Montez MD	[id number]	[facility] [employer]  B	N	B	06/13/2022	06/13/2022	diazepam 5 mg tablet	90	30	Navdeep Montez MD	[id number]	[facility] [employer]  B	N	O	06/13/2022	06/13/2022	acetaminophen-cod #4 tablet	120	30	Navdeep Montez MD	[id number]	[facility] [facility]  C	N	O	04/11/2023	04/11/2023	morphine sulf 100 mg/5 ml conc	15ml	10	Luther Sanchez	[id number]	[facility]  C	N	O	04/11/2023	04/11/2023	oxycodone-acetaminophen 5-325 mg tab	30	2	Luther Sanchez	[id number]	[facility]  C	N		04/10/2023	04/10/2023	zolpidem tartrate 5 mg tablet	30	15	Luther Sanchez	[id number]	[facility]  C	N	O	04/05/2023	04/05/2023	oxycodone-acetaminophen 5-325 mg tab	30	3	Luther Sanchez	[id number]	[facility]  C	N		04/05/2023	04/05/2023	butalbital-aspirin-caffeine -40 mg capsule	15	5	Felipe Rabago J	[id number]	[employer]	[facility]  C	N	O	04/05/2023	04/05/2023	oxycodone-acetaminophen 5-325 mg tab	30	2	Luther Sanchez	[id number]	[facility]  C	N		03/31/2023	04/01/2023	zolpidem tartrate 5 mg tablet	14	14	Gem MADY Wang	[id number]	[employer]	[facility]  C	N	B	03/31/2023	04/01/2023	clonazepam 0.5 mg tablet	28	14	Gem MADY Wang	[id number]	[facility]	[facility]  C	N	O	03/31/2023	04/01/2023	oxycodone-acetaminophen 5-325 mg tab	30	3	Felipe Rabago J	[id number]	[facility]  D	N	O	05/11/2023	05/12/2023	oxycodone-acetaminophen 5-325 mg tab	30	2	Marko Pepper MD	[id number]	[facility]  D	N	O	05/06/2023	05/06/2023	oxycodone-acetaminophen 5-325 mg tab	30	2	Marko Pepper MD	[id number]	[facility]  D	Y	B	05/05/2023	05/05/2023	clonazepam 0.5 mg tablet	60	20	Marko Pepper MD	[id number]	[facility]  D	N	O	05/01/2023	05/02/2023	oxycodone-acetaminophen 5-325 mg tab	30	3	Marko Pepper MD	[id number]	[facility]  D	Y		04/28/2023	04/28/2023	zolpidem tartrate 5 mg tablet	30	30	Marko Pepper MD	[id number]	[facility]  D	N	O	04/26/2023	04/27/2023	oxycodone-acetaminophen 5-325 mg tab	30	2	Marko Pepper MD	[id number]	[facility]  D	N	B	04/26/2023	04/27/2023	clonazepam 0.5 mg tablet	30	10	Marko Pepper MD	[id number]	[facility]    * - Details of Drug Type : O = Opioid, B = Benzodiazepine, S = Stimulant    * - Drugs marked with an asterisk are compound drugs. If the compound drug is made up of more than one controlled substance, then each controlled substance will be a separate row in the table. Search Terms: alexandra osman, 1945Search Date: 05/17/2023 01:22:08 AM  Searching on behalf of: [facility]  The Drug Utilization Report below displays all of the controlled substance prescriptions, if any, that your patient has filled in the last twelve months. The information displayed on this report is compiled from pharmacy submissions to the Department, and accurately reflects the information as submitted by the pharmacies.    This report was requested by: Juan Pope | Reference #: [id number]    A	N	O	10/04/2022	02/08/2023	diphenoxylate-atropine 2.5-0.025 mg tablet	12	4	Navdeep Montez MD	[id number]	[employer] [facility]  A	N	O	10/04/2022	02/03/2023	diphenoxylate-atropine 2.5-0.025 mg tablet	12	4	Navdeep Montez MD	[id number]	[facility]  A	N	O	10/04/2022	11/05/2022	diphenoxylate-atropine 2.5-0.025 mg tablet	12	4	Navdeep Montez MD	[id number]	[facility]  A	N	O	10/04/2022	10/04/2022	diphenoxylate-atropine 2.5-0.025 mg tablet	12	4	Navdeep Mnotez MD	[id number]	[facility]  B	Y		05/15/2023	05/15/2023	zolpidem tartrate 5 mg tablet	30	30	Marko Pepper MD	[id number]	[facility]  B	Y	O	05/15/2023	05/15/2023	oxycodone-acetaminophen 5-325 mg tablet	60	30	Marko Pepper MD	[id number]	[facility]  B	Y	B	05/15/2023	05/15/2023	clonazepam 0.5 mg tablet	60	20	Marko Pepper MD	[id number]	[facility]  B	N	O	03/13/2023	03/13/2023	acetaminophen-cod #4 tablet	120	30	Navdeep Montez MD	[id number]	[facility]  B	N		03/06/2023	03/06/2023	zolpidem tartrate 10 mg tablet	30	30	Navdeep Montez MD	[id number]	[facility]  B	N	O	02/06/2023	03/05/2023	diphenoxylate-atropine 2.5-0.025 mg tablet	12	3	Navdeep Montez MD	[id number]	[facility]  B	N	O	02/06/2023	03/03/2023	diphenoxylate-atropine 2.5-0.025 mg tablet	12	3	Navdeep Montez MD	[id number]	[facility]  B	N	B	03/02/2023	03/02/2023	clonazepam 0.5 mg tablet	60	30	Navdeep Montez MD	[id number]	[facility] [employer]  B	N	O	02/13/2023	02/13/2023	acetaminophen-cod #4 tablet	120	30	Navdeep Montez MD	[id number]	[facility] [employer]  B	N	B	01/30/2023	02/07/2023	clonazepam 0.5 mg tablet	60	30	Navdeep Montez MD	[id number]	[facility] [employer]  B	N	O	02/06/2023	02/07/2023	diphenoxylate-atropine 2.5-0.025 mg tablet	12	3	Navdeep Montez MD	[id number]	[facility] [employer]  B	N	B	01/17/2023	01/30/2023	clonazepam 0.5 mg tablet	28	14	[facility]	[id number]	[facility] [employer]  B	N	O	01/11/2023	01/11/2023	acetaminophen-cod #4 tablet	120	30	Navdeep Montez MD	[id number]	[facility] [employer]  B	N	B	01/11/2023	01/11/2023	diazepam 5 mg tablet	90	30	Navdeep Montez MD	[id number]	[facility] [employer]  B	N	B	12/12/2022	12/12/2022	diazepam 5 mg tablet	90	30	Navdeep Montez MD	[id number]	[facility] [employer]  B	N	O	12/12/2022	12/12/2022	acetaminophen-cod #4 tablet	120	30	Nvadeep Montez MD	[id number]	[facility] [employer]  B	N	B	11/14/2022	11/14/2022	diazepam 5 mg tablet	90	30	Navdeep Montez MD	[id number]	[facility] [employer]  B	N	O	11/14/2022	11/14/2022	acetaminophen-cod #4 tablet	120	30	Navdeep Montez MD	[id number]	[facility] [employer]  B	N	B	10/12/2022	10/12/2022	diazepam 5 mg tablet	90	30	Navdeep Montez MD	[id number]	[facility] [employer]  B	N	O	10/12/2022	10/12/2022	acetaminophen-cod #4 tablet	60	15	Navdeep Montez MD	[id number]	[facility] [employer]  B	N	O	05/16/2022	09/29/2022	diphenoxylate-atropine 2.5-0.025 mg tablet	12	3	Navdeep Montez MD	[id number]	[facility] [employer]  B	N	B	09/12/2022	09/12/2022	diazepam 5 mg tablet	90	30	Navdeep Montez MD	[id number]	[facility] [employer]  B	N	O	09/12/2022	09/12/2022	acetaminophen-cod #4 tablet	120	30	Navdeep Montez MD	[id number]	[facility] [employer]  B	N	O	05/16/2022	09/09/2022	diphenoxylate-atropine 2.5-0.025 mg tablet	12	3	Navdeep Montez MD	[id number]	[facility] [employer]  B	N	O	05/16/2022	08/26/2022	diphenoxylate-atropine 2.5-0.025 mg tablet	12	3	Navdeep Montez MD	[id number]	[employer]  B	N	O	08/15/2022	08/15/2022	acetaminophen-cod #4 tablet	120	30	Navdeep Montez MD	[id number]	[employer]	[employer]  B	N	B	08/15/2022	08/15/2022	diazepam 5 mg tablet	90	30	Navdeep Montez MD	[id number]	[facility] [employer]  B	N	B	07/11/2022	07/11/2022	diazepam 5 mg tablet	90	30	Navdeep Montez MD	[id number]	[employer]	[employer]  B	N	O	07/11/2022	07/11/2022	acetaminophen-cod #4 tablet	120	30	Navdeep Montez MD	[id number]	[facility]  B	N	B	06/13/2022	06/13/2022	diazepam 5 mg tablet	90	30	Navdeep Montez MD	[id number]	[facility]  B	N	O	06/13/2022	06/13/2022	acetaminophen-cod #4 tablet	120	30	Navdeep Montez MD	[id number]	[facility]    * - Details of Drug Type : O = Opioid, B = Benzodiazepine, S = Stimulant    * - Drugs marked with an asterisk are compound drugs. If the compound drug is made up of more than one controlled substance, then each controlled substance will be a separate row in the table.

## 2023-05-18 LAB
ALBUMIN SERPL ELPH-MCNC: 3 G/DL — LOW (ref 3.3–5)
ALP SERPL-CCNC: 57 U/L — SIGNIFICANT CHANGE UP (ref 40–120)
ALT FLD-CCNC: 9 U/L — SIGNIFICANT CHANGE UP (ref 4–33)
ANION GAP SERPL CALC-SCNC: 12 MMOL/L — SIGNIFICANT CHANGE UP (ref 7–14)
APTT BLD: 29.9 SEC — SIGNIFICANT CHANGE UP (ref 27–36.3)
AST SERPL-CCNC: 26 U/L — SIGNIFICANT CHANGE UP (ref 4–32)
BASOPHILS # BLD AUTO: 0.03 K/UL — SIGNIFICANT CHANGE UP (ref 0–0.2)
BASOPHILS NFR BLD AUTO: 0.4 % — SIGNIFICANT CHANGE UP (ref 0–2)
BILIRUB SERPL-MCNC: 0.4 MG/DL — SIGNIFICANT CHANGE UP (ref 0.2–1.2)
BUN SERPL-MCNC: 38 MG/DL — HIGH (ref 7–23)
CALCIUM SERPL-MCNC: 8.6 MG/DL — SIGNIFICANT CHANGE UP (ref 8.4–10.5)
CHLORIDE SERPL-SCNC: 104 MMOL/L — SIGNIFICANT CHANGE UP (ref 98–107)
CK SERPL-CCNC: 370 U/L — HIGH (ref 25–170)
CO2 SERPL-SCNC: 25 MMOL/L — SIGNIFICANT CHANGE UP (ref 22–31)
CREAT SERPL-MCNC: 1.02 MG/DL — SIGNIFICANT CHANGE UP (ref 0.5–1.3)
CULTURE RESULTS: SIGNIFICANT CHANGE UP
EGFR: 57 ML/MIN/1.73M2 — LOW
EOSINOPHIL # BLD AUTO: 0.09 K/UL — SIGNIFICANT CHANGE UP (ref 0–0.5)
EOSINOPHIL NFR BLD AUTO: 1.2 % — SIGNIFICANT CHANGE UP (ref 0–6)
GLUCOSE SERPL-MCNC: 98 MG/DL — SIGNIFICANT CHANGE UP (ref 70–99)
HCT VFR BLD CALC: 28.4 % — LOW (ref 34.5–45)
HGB BLD-MCNC: 8.7 G/DL — LOW (ref 11.5–15.5)
IANC: 6.14 K/UL — SIGNIFICANT CHANGE UP (ref 1.8–7.4)
IMM GRANULOCYTES NFR BLD AUTO: 0.4 % — SIGNIFICANT CHANGE UP (ref 0–0.9)
LYMPHOCYTES # BLD AUTO: 0.53 K/UL — LOW (ref 1–3.3)
LYMPHOCYTES # BLD AUTO: 7 % — LOW (ref 13–44)
MAGNESIUM SERPL-MCNC: 2 MG/DL — SIGNIFICANT CHANGE UP (ref 1.6–2.6)
MCHC RBC-ENTMCNC: 29.3 PG — SIGNIFICANT CHANGE UP (ref 27–34)
MCHC RBC-ENTMCNC: 30.6 GM/DL — LOW (ref 32–36)
MCV RBC AUTO: 95.6 FL — SIGNIFICANT CHANGE UP (ref 80–100)
MONOCYTES # BLD AUTO: 0.72 K/UL — SIGNIFICANT CHANGE UP (ref 0–0.9)
MONOCYTES NFR BLD AUTO: 9.5 % — SIGNIFICANT CHANGE UP (ref 2–14)
NEUTROPHILS # BLD AUTO: 6.14 K/UL — SIGNIFICANT CHANGE UP (ref 1.8–7.4)
NEUTROPHILS NFR BLD AUTO: 81.5 % — HIGH (ref 43–77)
NRBC # BLD: 0 /100 WBCS — SIGNIFICANT CHANGE UP (ref 0–0)
NRBC # FLD: 0 K/UL — SIGNIFICANT CHANGE UP (ref 0–0)
PHOSPHATE SERPL-MCNC: 3 MG/DL — SIGNIFICANT CHANGE UP (ref 2.5–4.5)
PLATELET # BLD AUTO: 187 K/UL — SIGNIFICANT CHANGE UP (ref 150–400)
POTASSIUM SERPL-MCNC: 3.8 MMOL/L — SIGNIFICANT CHANGE UP (ref 3.5–5.3)
POTASSIUM SERPL-SCNC: 3.8 MMOL/L — SIGNIFICANT CHANGE UP (ref 3.5–5.3)
PROT SERPL-MCNC: 7.2 G/DL — SIGNIFICANT CHANGE UP (ref 6–8.3)
RBC # BLD: 2.97 M/UL — LOW (ref 3.8–5.2)
RBC # FLD: 13.3 % — SIGNIFICANT CHANGE UP (ref 10.3–14.5)
SODIUM SERPL-SCNC: 141 MMOL/L — SIGNIFICANT CHANGE UP (ref 135–145)
SPECIMEN SOURCE: SIGNIFICANT CHANGE UP
TRANSFERRIN SERPL-MCNC: 184 MG/DL — LOW (ref 200–360)
WBC # BLD: 7.54 K/UL — SIGNIFICANT CHANGE UP (ref 3.8–10.5)
WBC # FLD AUTO: 7.54 K/UL — SIGNIFICANT CHANGE UP (ref 3.8–10.5)

## 2023-05-18 PROCEDURE — 99233 SBSQ HOSP IP/OBS HIGH 50: CPT

## 2023-05-18 PROCEDURE — 73590 X-RAY EXAM OF LOWER LEG: CPT | Mod: 26,RT

## 2023-05-18 PROCEDURE — 99232 SBSQ HOSP IP/OBS MODERATE 35: CPT

## 2023-05-18 PROCEDURE — 93971 EXTREMITY STUDY: CPT | Mod: 26

## 2023-05-18 RX ORDER — DAPTOMYCIN 500 MG/10ML
300 INJECTION, POWDER, LYOPHILIZED, FOR SOLUTION INTRAVENOUS EVERY 24 HOURS
Refills: 0 | Status: DISCONTINUED | OUTPATIENT
Start: 2023-05-18 | End: 2023-05-23

## 2023-05-18 RX ORDER — HEPARIN SODIUM 5000 [USP'U]/ML
INJECTION INTRAVENOUS; SUBCUTANEOUS
Qty: 25000 | Refills: 0 | Status: DISCONTINUED | OUTPATIENT
Start: 2023-05-18 | End: 2023-05-22

## 2023-05-18 RX ORDER — HEPARIN SODIUM 5000 [USP'U]/ML
4500 INJECTION INTRAVENOUS; SUBCUTANEOUS EVERY 6 HOURS
Refills: 0 | Status: DISCONTINUED | OUTPATIENT
Start: 2023-05-18 | End: 2023-05-23

## 2023-05-18 RX ORDER — HEPARIN SODIUM 5000 [USP'U]/ML
2000 INJECTION INTRAVENOUS; SUBCUTANEOUS EVERY 6 HOURS
Refills: 0 | Status: DISCONTINUED | OUTPATIENT
Start: 2023-05-18 | End: 2023-05-23

## 2023-05-18 RX ORDER — KETOROLAC TROMETHAMINE 30 MG/ML
15 SYRINGE (ML) INJECTION ONCE
Refills: 0 | Status: DISCONTINUED | OUTPATIENT
Start: 2023-05-18 | End: 2023-05-18

## 2023-05-18 RX ORDER — HYDROMORPHONE HYDROCHLORIDE 2 MG/ML
0.5 INJECTION INTRAMUSCULAR; INTRAVENOUS; SUBCUTANEOUS ONCE
Refills: 0 | Status: DISCONTINUED | OUTPATIENT
Start: 2023-05-18 | End: 2023-05-18

## 2023-05-18 RX ORDER — BACITRACIN ZINC 500 UNIT/G
1 OINTMENT IN PACKET (EA) TOPICAL
Refills: 0 | Status: DISCONTINUED | OUTPATIENT
Start: 2023-05-18 | End: 2023-05-31

## 2023-05-18 RX ORDER — NICOTINE POLACRILEX 2 MG
1 GUM BUCCAL DAILY
Refills: 0 | Status: DISCONTINUED | OUTPATIENT
Start: 2023-05-18 | End: 2023-05-31

## 2023-05-18 RX ORDER — HYDROMORPHONE HYDROCHLORIDE 2 MG/ML
0.25 INJECTION INTRAMUSCULAR; INTRAVENOUS; SUBCUTANEOUS ONCE
Refills: 0 | Status: DISCONTINUED | OUTPATIENT
Start: 2023-05-18 | End: 2023-05-18

## 2023-05-18 RX ADMIN — OXYCODONE HYDROCHLORIDE 10 MILLIGRAM(S): 5 TABLET ORAL at 17:28

## 2023-05-18 RX ADMIN — OXYCODONE HYDROCHLORIDE 10 MILLIGRAM(S): 5 TABLET ORAL at 05:14

## 2023-05-18 RX ADMIN — OXYCODONE HYDROCHLORIDE 10 MILLIGRAM(S): 5 TABLET ORAL at 11:22

## 2023-05-18 RX ADMIN — HEPARIN SODIUM 1100 UNIT(S)/HR: 5000 INJECTION INTRAVENOUS; SUBCUTANEOUS at 19:19

## 2023-05-18 RX ADMIN — HEPARIN SODIUM 1100 UNIT(S)/HR: 5000 INJECTION INTRAVENOUS; SUBCUTANEOUS at 20:40

## 2023-05-18 RX ADMIN — HYDROMORPHONE HYDROCHLORIDE 0.25 MILLIGRAM(S): 2 INJECTION INTRAMUSCULAR; INTRAVENOUS; SUBCUTANEOUS at 17:21

## 2023-05-18 RX ADMIN — OXYCODONE HYDROCHLORIDE 10 MILLIGRAM(S): 5 TABLET ORAL at 11:52

## 2023-05-18 RX ADMIN — OXYCODONE HYDROCHLORIDE 10 MILLIGRAM(S): 5 TABLET ORAL at 23:36

## 2023-05-18 RX ADMIN — OXYCODONE HYDROCHLORIDE 10 MILLIGRAM(S): 5 TABLET ORAL at 05:44

## 2023-05-18 RX ADMIN — Medication 15 MILLIGRAM(S): at 01:51

## 2023-05-18 RX ADMIN — Medication 0.5 MILLIGRAM(S): at 22:21

## 2023-05-18 RX ADMIN — HYDROMORPHONE HYDROCHLORIDE 0.25 MILLIGRAM(S): 2 INJECTION INTRAMUSCULAR; INTRAVENOUS; SUBCUTANEOUS at 17:50

## 2023-05-18 RX ADMIN — Medication 0.5 MILLIGRAM(S): at 09:50

## 2023-05-18 RX ADMIN — OXYCODONE HYDROCHLORIDE 10 MILLIGRAM(S): 5 TABLET ORAL at 17:50

## 2023-05-18 RX ADMIN — HEPARIN SODIUM 5000 UNIT(S): 5000 INJECTION INTRAVENOUS; SUBCUTANEOUS at 13:31

## 2023-05-18 RX ADMIN — Medication 1 APPLICATION(S): at 17:22

## 2023-05-18 RX ADMIN — DAPTOMYCIN 112 MILLIGRAM(S): 500 INJECTION, POWDER, LYOPHILIZED, FOR SOLUTION INTRAVENOUS at 23:14

## 2023-05-18 RX ADMIN — CEFEPIME 100 MILLIGRAM(S): 1 INJECTION, POWDER, FOR SOLUTION INTRAMUSCULAR; INTRAVENOUS at 13:30

## 2023-05-18 NOTE — PROGRESS NOTE ADULT - ATTENDING COMMENTS
77F w/alcohol abuse, Anxiety, CAD, emphysema, HLD, HTN, migraine, seizure disorder, MRSA bacteremia 2/2 recurrence of MRSA R hip chronic OM based on R hip aspiration however unable to locate record of MRSA culture admitted for encephalopathy 2/2 opioid overdose, resolved with Narcan, w/underlying severe sepsis 2/2 suspected OM, possible bacteremia c/b hypotension and LAI  #Toxic metabolic encephalopathy 2/2 opioid overdose: resolved with Narcan in ER. now oriented 3.   #Sepsis 2/2 SSTI vs OM: c/w Dapto and Cefepime, pain and warmth/erythema improved today, xrays completed, but CT still pending, reordered with IV contrast last night as LAI resolved. Ortho consulted  #LAI 2/2 sepsis: resolved  #Opioid abuse: c/w oxy inpt. No IV meds. brother endorses pt has overdosed 2-3x already, required Narcan, takes too many opioids when she is at home and has led to falls.   VM left per pts dgtBrittni ([phone])  Updated, pts brother, Louie Cummings ([phone]). GOC ongoing as above.

## 2023-05-18 NOTE — PROGRESS NOTE ADULT - CONVERSATION DETAILS
Discussed recurrent admissions and infections with patient, however she is too anxious to speak about her health. Asked for me to call her dgt, Brittni [phone] and her brother, Louie Cummings [phone]. When asked if she elects either as HCPs she states she trusts her brother more.    left for Brittni.  Was able to reach brother who is concerned about pt, he states pt has had multiple falls from overdoses and is addicted to opioids and benzos. She has had at least 3 overdoses requiring Narcan. She was getting pain meds from different doctors in her neighborhood until he called them and asked them to stop.   He also states she has been in and out of the hospital since hip replacement d/t infections, asking if they can operate and just remove the infection. Understands we are still waiting for CT and ortho recs, but unlikely a surgical candidate.

## 2023-05-18 NOTE — PROGRESS NOTE ADULT - PROBLEM SELECTOR PLAN 1
-On admission, pt w/ fever to 102 and tachycardia  -Exam c/f RLE cellulitis. Also hx of R hip OM 2/2 MRSA  -CXR clear and UA unremarkable.   -s/p 2L in ED; give additional 1L  -s/p broad spectrum abx w/ cefepime and vanco  > f/u blood cx  > f/u CT of right leg & hip d/t concern for progressive cellulitis   > ID c/s     > C/w Dapto

## 2023-05-18 NOTE — PATIENT PROFILE ADULT - FUNCTIONAL ASSESSMENT - DAILY ACTIVITY ASSESSMENT TYPE
A message has been left for Yordan to call our office back in order to help him schedule his US RLE. Once call back is received, he will be transferred to pre-services to schedule US.    CC: Dwaine Bonilla MD   Admission

## 2023-05-18 NOTE — PROGRESS NOTE ADULT - ASSESSMENT
77 year old with history of chronic osteomyelitis of the right hip with prior MRSA infection/ bacteremia. She presented with altered mental status        1) Periprosthetic chronic OM  prior Cultures with MRSA    Treatment is surgical   On exam, there is warmth and induration over the right hip.  She also has pain with movement of the right ankle    She still has induration and warmth over the right hip in spite of 6 weeks of iv antibiotics.   She has failed attempted treatment with antibiotics.     Can continue Daptomycin for now- change to daily  Stop cefepime    Get culture results from [facility] to see if there is an oral option for chronic suppression.  Chronic suppression should be part of a palliative approach if the patient is not a surgical candidate.     Regardless of imaging findings, she needs an evaluation by an orthopedic surgeon    2) Podiatry eval of the right lateral malleolar ulcer    Discussed with primary team

## 2023-05-18 NOTE — PROGRESS NOTE ADULT - ASSESSMENT
78 Y/O F PMH Alcohol abuse, Anxiety, CAD (coronary artery disease), Emphysema, HLD, HTN, Migraine, hx of MRSA Bacteremia, Seizure disorder was brought in for change in mental status. Concern for sepsis, complicated by septic encephalopathy vs drug induced encephalopathy. Admitted to medicine for further evaluation

## 2023-05-18 NOTE — PATIENT PROFILE ADULT - HEALTH LITERACY
Epidural Procedure Note    Staff:     Anesthesiologist:  PHILLIP OSCAR    Resident/CRNA:  KALPANA PACHECO    Procedure performed by resident/CRNA in the presence of a teaching physician    Location: OB     Procedure start time:  12/28/2017 12:00 AM     Procedure end time:  12/28/2017 12:34 AM   Pre-procedure checklist:   patient identified, IV checked, site marked, risks and benefits discussed, informed consent, monitors and equipment checked, pre-op evaluation, at physician/surgeon's request and post-op pain management      Correct Patient: Yes      Correct Position: Yes      Correct Site: Yes      Correct Procedure: Yes      Correct Laterality:  Yes    Site Marked:  Yes  Procedure:     Procedure:  Epidural catheter    ASA:  3    Position:  Sitting    Insertion site:  L3-4    Local skin infiltration:  1% lidocaine    amount (mL):  3    Approach:  Midline    Needle gauge (G):  17    Needle Length (in):  5    Block Needle Type:  Touhy    Injection Technique:  LORT saline    PONCE at (cm):  6    Attempts:  1    Redirects:  0    Catheter gauge (G):  19    Catheter threaded easily: Yes      Threaded to cm at skin:  12    Paresthesias:  No    Aspiration negative for Heme or CSF: Yes      Test dose (mL):  3     Test dose names: 1% lidocaine without epi.    Test dose time:  00:30    Test dose negative for signs of intravascular, subdural or intrathecal injection: Yes           no

## 2023-05-18 NOTE — ADVANCED PRACTICE NURSE CONSULT - ASSESSMENT
General: A&Ox2, confused, able to turn with 1x assistance, continent of urine and stool at times. Skin warm, dry with increased moisture in intertriginous folds, scattered areas of hyperpigmentation and hypopigmentation, scattered areas of ecchymosis on bilateral upper extremities with no hematomas. Blanchable erythema on bilateral heels.     Vascular: Bilateral lower extremities with scattered areas of hyper and hypopigmentation. Dry, flaky skin. Thin, shiny, taught skin. No temperature changes noted. +2  Edema to R leg and ankle. Capillary refill <3 seconds. Bounding palpable DP/PT pulses, with bilateral monophasic doppler sounds. Patient denies pain to LLE, but complains of RLE movement due to R hip w/ previous fracture. Patient with positive sensation to bilateral plantar 1st, 3rd and 5th metatarsal pads, plantar heels and dorsal aspect of foot.    R lateral malleolus: Arterial wound measuring 2.5cmx2.5cmx0.3cm with 100% yellow/tan adherent slough to wound base. Periwound with blanchable erythema circumferentially, not >2 cm. Scant serosanguinous drainage. No induration, no crepitus, no fluctuance, no edema, no temperature changes, no overt signs of infection noted.

## 2023-05-18 NOTE — PROGRESS NOTE ADULT - SUBJECTIVE AND OBJECTIVE BOX
Follow Up:      Inverval History/ROS:Patient is a 77y old  Female who presents with a chief complaint of AMS (18 May 2023 11:59)      Allergies    No Known Allergies    Intolerances        ANTIMICROBIALS:  cefepime   IVPB 2000 every 12 hours  DAPTOmycin IVPB 300 every 48 hours      OTHER MEDS:  albuterol/ipratropium for Nebulization 3 milliLiter(s) Nebulizer every 6 hours  bacitracin   Ointment 1 Application(s) Topical two times a day  clonazePAM  Tablet 0.5 milliGRAM(s) Oral every 12 hours  heparin   Injectable 5000 Unit(s) SubCutaneous every 8 hours  oxyCODONE    IR 5 milliGRAM(s) Oral every 6 hours PRN  oxyCODONE    IR 10 milliGRAM(s) Oral every 6 hours PRN      Vital Signs Last 24 Hrs  T(C): 37.2 (18 May 2023 09:30), Max: 38.2 (17 May 2023 21:30)  T(F): 98.9 (18 May 2023 09:30), Max: 100.8 (17 May 2023 21:30)  HR: 83 (18 May 2023 09:30) (76 - 91)  BP: 111/79 (18 May 2023 09:30) (111/79 - 138/69)  BP(mean): --  RR: 18 (18 May 2023 09:30) (18 - 19)  SpO2: 98% (18 May 2023 09:30) (97% - 100%)    Parameters below as of 18 May 2023 09:30  Patient On (Oxygen Delivery Method): nasal cannula  O2 Flow (L/min): 2      PHYSICAL EXAM:  General: [ ] non-toxic  HEAD/EYES: [ ] PERRL [ ] white sclera [ ] icterus  ENT:  [ ] normal [ ] supple [ ] thrush [ ] pharyngeal exudate  Cardiovascular:   [ ] murmur [ ] normal [ ] PPM/AICD  Respiratory:  [ ] clear to ausculation bilaterally  GI:  [ ] soft, non-tender, normal bowel sounds  :  [ ] edmondson [ ] no CVA tenderness   Musculoskeletal:  [ ] no synovitis  Neurologic:  [ ] non-focal exam   Skin:  [ ] no rash  Lymph: [ ] no lymphadenopathy  Psychiatric:  [ ] appropriate affect [ ] alert & oriented  Lines:  [ ] no phlebitis [ ] central line                                8.7    7.54  )-----------( 187      ( 18 May 2023 03:45 )             28.4           141  |  104  |  38<H>  ----------------------------<  98  3.8   |  25  |  1.02    Ca    8.6      18 May 2023 03:45  Phos  3.0       Mg     2.00         TPro  7.2  /  Alb  3.0<L>  /  TBili  0.4  /  DBili  x   /  AST  26  /  ALT  9   /  AlkPhos  57        Urinalysis Basic - ( 17 May 2023 00:30 )    Color: Light Yellow / Appearance: Clear / S.014 / pH: x  Gluc: x / Ketone: Negative  / Bili: Negative / Urobili: <2 mg/dL   Blood: x / Protein: Trace / Nitrite: Negative   Leuk Esterase: Negative / RBC: 1 /HPF / WBC 0 /HPF   Sq Epi: x / Non Sq Epi: x / Bacteria: Negative        MICROBIOLOGY:Culture Results:   <10,000 CFU/mL Normal Urogenital Mone (23 @ 00:30)  Culture Results:   No growth to date. (23 @ 23:30)  Culture Results:   No growth to date. (23 @ 23:17)      RADIOLOGY:

## 2023-05-18 NOTE — PATIENT PROFILE ADULT - FALL HARM RISK - HARM RISK INTERVENTIONS
Assistance with ambulation/Assistance OOB with selected safe patient handling equipment/Communicate Risk of Fall with Harm to all staff/Discuss with provider need for PT consult/Monitor gait and stability/Reinforce activity limits and safety measures with patient and family/Tailored Fall Risk Interventions/Visual Cue: Yellow wristband and red socks/Bed in lowest position, wheels locked, appropriate side rails in place/Call bell, personal items and telephone in reach/Instruct patient to call for assistance before getting out of bed or chair/Non-slip footwear when patient is out of bed/[place] to call system/Physically safe environment - no spills, clutter or unnecessary equipment/Purposeful Proactive Rounding/Room/bathroom lighting operational, light cord in reach

## 2023-05-18 NOTE — PATIENT PROFILE ADULT - HAVE YOU EXPERIENCED VIOLENCE OR A TRAUMATIC EVENT?
Problem: Pain  Goal: #Acceptable pain level achieved/maintained at rest using NRS/Faces  Description: This goal is used for patients who can self-report.  Acceptable means the level is at or below the identified comfort/function goal.  Outcome: Outcome Not Met, Continue to Monitor  Goal: # Acceptable pain level achieved/maintained at rest using NRS/Faces without oversedation (opioid naive or PCA/Epidural infusion)  Description: This goal is used if Opioid-naïve or on PCA/Epidural Infusion.  Outcome: Outcome Not Met, Continue to Monitor  Goal: # Acceptable pain level achieved/maintained with activity using NRS/Faces  Description: This goal is used for patients who can self-report and are not achieving acceptable pain control during activity.  Outcome: Outcome Not Met, Continue to Monitor  Goal: # Verbalizes understanding of pain management  Description: Documented in Patient Education Activity  Outcome: Outcome Not Met, Continue to Monitor      no

## 2023-05-18 NOTE — ADVANCED PRACTICE NURSE CONSULT - REASON FOR CONSULT
Attempted to see patient on Wound Care Rounds, patient off unit in interventional radiology. Will follow up with patient for wound care recommendations. 
Patient seen on skin care rounds after wound care referral received for assessment of skin impairment and recommendations of topical management of the lateral malleolus.  As per h&P, patient is a 78 Y/O F PMH Alcohol abuse, Anxiety, CAD (coronary artery disease), Emphysema, HLD, HTN, Migraine, MRSA Bacteremia, Seizure disorder was brought in for change in mental status. Hx obtained from chart review; limited hx from  from phone as  w/ hx of CVA. Pt was recently admitted to [facility] for R hip pain; has had multiple hip surgeries in the past. During the admission, c/f recurrence of MRSA R hip chronic OM based on R hip aspiration. Pt was managed with daptomycin to complete at rehab until 5/14. Pt was discharged to home recently but pt unable to walk or perform any ADLs. Pt also talking less than usual. Per chart review, pt has a habit of taking too much medication. She is on klonipin and ambien at home, also on opioids.     As per H&P, patient diagnosed w/ PAD during last admission.  ANDREA/PVR:  Moderate bilateral lower extremity arterial flow limitation localizing to the popliteal and infrapopliteal levels. s/p angio; no plan for surgical intervention per vascular during last admission, on ASA and atorvastatin 40mg.    Chart reviewed: WBC: 7.54, H&H: 8.7/28.4, Platelets: 187, INR: 1.11, A1C: 5.1, serum albumin: 3.0, BMI: 22.5, Pollo 11.

## 2023-05-18 NOTE — PROGRESS NOTE ADULT - SUBJECTIVE AND OBJECTIVE BOX
PROGRESS NOTE:   Authored by Dr. Carter Lorenzo MD ,    Patient is a 77y old  Female who presents with a chief complaint of AMS (17 May 2023 16:33)      SUBJECTIVE / OVERNIGHT EVENTS: No events ON.    ADDITIONAL REVIEW OF SYSTEMS: Denies new symptoms       MEDICATIONS  (STANDING):  albuterol/ipratropium for Nebulization 3 milliLiter(s) Nebulizer every 6 hours  cefepime   IVPB 2000 milliGRAM(s) IV Intermittent every 12 hours  clonazePAM  Tablet 0.5 milliGRAM(s) Oral every 12 hours  DAPTOmycin IVPB 300 milliGRAM(s) IV Intermittent every 48 hours  heparin   Injectable 5000 Unit(s) SubCutaneous every 8 hours  sodium chloride 0.9%. 1000 milliLiter(s) (75 mL/Hr) IV Continuous <Continuous>    MEDICATIONS  (PRN):  oxyCODONE    IR 5 milliGRAM(s) Oral every 6 hours PRN Moderate Pain (4 - 6)  oxyCODONE    IR 10 milliGRAM(s) Oral every 6 hours PRN Severe Pain (7 - 10)      CAPILLARY BLOOD GLUCOSE        I&O's Summary    17 May 2023 07:01  -  18 May 2023 07:00  --------------------------------------------------------  IN: 100 mL / OUT: 500 mL / NET: -400 mL        PHYSICAL EXAM:  Vital Signs Last 24 Hrs  T(C): 37.2 (18 May 2023 09:30), Max: 38.2 (17 May 2023 21:30)  T(F): 98.9 (18 May 2023 09:30), Max: 100.8 (17 May 2023 21:30)  HR: 83 (18 May 2023 09:30) (76 - 91)  BP: 111/79 (18 May 2023 09:30) (111/79 - 138/69)  BP(mean): --  RR: 18 (18 May 2023 09:30) (18 - 20)  SpO2: 98% (18 May 2023 09:30) (97% - 100%)    Parameters below as of 18 May 2023 09:30  Patient On (Oxygen Delivery Method): nasal cannula  O2 Flow (L/min): 2      Constitutional: NAD; somnolent, arousable  Head: NC/AT  Eyes: pupils sluggish reactive to light, anicteric sclera  ENT: no nasal discharge; dry MM  Neck: supple; no JVD  Respiratory: CTA B/L; no W/R/R  Cardiac: systolic ejection murmur, RRR  Gastrointestinal: soft, NT/ND; no rebound or guarding  Extremities: WWP, no clubbing or cyanosis; no peripheral edema.  Musculoskeletal: RLE erythema, no warmth  Vascular: 2+ radial, DP/PT pulses B/L  Dermatologic: skin warm, dry  Neuro: A&Ox4 today    LABS:                        8.7    7.54  )-----------( 187      ( 18 May 2023 03:45 )             28.4     05-    141  |  104  |  38<H>  ----------------------------<  98  3.8   |  25  |  1.02    Ca    8.6      18 May 2023 03:45  Phos  3.0     -  Mg     2.00     -18    TPro  7.2  /  Alb  3.0<L>  /  TBili  0.4  /  DBili  x   /  AST  26  /  ALT  9   /  AlkPhos  57  05-18      CARDIAC MARKERS ( 18 May 2023 03:45 )  x     / x     / 370 U/L / x     / x          Urinalysis Basic - ( 17 May 2023 00:30 )    Color: Light Yellow / Appearance: Clear / S.014 / pH: x  Gluc: x / Ketone: Negative  / Bili: Negative / Urobili: <2 mg/dL   Blood: x / Protein: Trace / Nitrite: Negative   Leuk Esterase: Negative / RBC: 1 /HPF / WBC 0 /HPF   Sq Epi: x / Non Sq Epi: x / Bacteria: Negative        Culture - Urine (collected 17 May 2023 00:30)  Source: Clean Catch Clean Catch (Midstream)  Final Report (18 May 2023 11:40):    <10,000 CFU/mL Normal Urogenital Mone    Culture - Blood (collected 16 May 2023 23:30)  Source: .Blood Blood-Venous  Preliminary Report (18 May 2023 04:02):    No growth to date.    Culture - Blood (collected 16 May 2023 23:17)  Source: .Blood Blood-Peripheral  Preliminary Report (18 May 2023 04:02):    No growth to date.        RADIOLOGY & ADDITIONAL TESTS:  Results Reviewed:   Imaging Personally Reviewed:  Electrocardiogram Personally Reviewed:    COORDINATION OF CARE:  Care Discussed with Consultants/Other Providers [Y/N]:  Prior or Outpatient Records Reviewed [Y/N]:

## 2023-05-19 ENCOUNTER — TRANSCRIPTION ENCOUNTER (OUTPATIENT)
Age: 78
End: 2023-05-19

## 2023-05-19 DIAGNOSIS — Z71.89 OTHER SPECIFIED COUNSELING: ICD-10-CM

## 2023-05-19 DIAGNOSIS — Z51.5 ENCOUNTER FOR PALLIATIVE CARE: ICD-10-CM

## 2023-05-19 DIAGNOSIS — R53.81 OTHER MALAISE: ICD-10-CM

## 2023-05-19 DIAGNOSIS — I82.409 ACUTE EMBOLISM AND THROMBOSIS OF UNSPECIFIED DEEP VEINS OF UNSPECIFIED LOWER EXTREMITY: ICD-10-CM

## 2023-05-19 LAB
ALBUMIN SERPL ELPH-MCNC: 3.3 G/DL — SIGNIFICANT CHANGE UP (ref 3.3–5)
ALP SERPL-CCNC: 76 U/L — SIGNIFICANT CHANGE UP (ref 40–120)
ALT FLD-CCNC: 13 U/L — SIGNIFICANT CHANGE UP (ref 4–33)
ANION GAP SERPL CALC-SCNC: 16 MMOL/L — HIGH (ref 7–14)
APTT BLD: 37.9 SEC — HIGH (ref 27–36.3)
APTT BLD: 55.2 SEC — HIGH (ref 27–36.3)
APTT BLD: 72.3 SEC — HIGH (ref 27–36.3)
AST SERPL-CCNC: 39 U/L — HIGH (ref 4–32)
BASOPHILS # BLD AUTO: 0.02 K/UL — SIGNIFICANT CHANGE UP (ref 0–0.2)
BASOPHILS NFR BLD AUTO: 0.2 % — SIGNIFICANT CHANGE UP (ref 0–2)
BILIRUB SERPL-MCNC: 0.4 MG/DL — SIGNIFICANT CHANGE UP (ref 0.2–1.2)
BUN SERPL-MCNC: 28 MG/DL — HIGH (ref 7–23)
CALCIUM SERPL-MCNC: 8.7 MG/DL — SIGNIFICANT CHANGE UP (ref 8.4–10.5)
CHLORIDE SERPL-SCNC: 97 MMOL/L — LOW (ref 98–107)
CK SERPL-CCNC: 240 U/L — HIGH (ref 25–170)
CO2 SERPL-SCNC: 20 MMOL/L — LOW (ref 22–31)
CREAT SERPL-MCNC: 0.77 MG/DL — SIGNIFICANT CHANGE UP (ref 0.5–1.3)
EGFR: 80 ML/MIN/1.73M2 — SIGNIFICANT CHANGE UP
EOSINOPHIL # BLD AUTO: 0.01 K/UL — SIGNIFICANT CHANGE UP (ref 0–0.5)
EOSINOPHIL NFR BLD AUTO: 0.1 % — SIGNIFICANT CHANGE UP (ref 0–6)
GLUCOSE SERPL-MCNC: 169 MG/DL — HIGH (ref 70–99)
HCT VFR BLD CALC: 30.4 % — LOW (ref 34.5–45)
HCT VFR BLD CALC: 32.8 % — LOW (ref 34.5–45)
HGB BLD-MCNC: 9.4 G/DL — LOW (ref 11.5–15.5)
HGB BLD-MCNC: 9.8 G/DL — LOW (ref 11.5–15.5)
IANC: 10.59 K/UL — HIGH (ref 1.8–7.4)
IMM GRANULOCYTES NFR BLD AUTO: 0.5 % — SIGNIFICANT CHANGE UP (ref 0–0.9)
LYMPHOCYTES # BLD AUTO: 0.29 K/UL — LOW (ref 1–3.3)
LYMPHOCYTES # BLD AUTO: 2.5 % — LOW (ref 13–44)
MAGNESIUM SERPL-MCNC: 1.8 MG/DL — SIGNIFICANT CHANGE UP (ref 1.6–2.6)
MCHC RBC-ENTMCNC: 29.8 PG — SIGNIFICANT CHANGE UP (ref 27–34)
MCHC RBC-ENTMCNC: 29.9 GM/DL — LOW (ref 32–36)
MCHC RBC-ENTMCNC: 30.3 PG — SIGNIFICANT CHANGE UP (ref 27–34)
MCHC RBC-ENTMCNC: 30.9 GM/DL — LOW (ref 32–36)
MCV RBC AUTO: 98.1 FL — SIGNIFICANT CHANGE UP (ref 80–100)
MCV RBC AUTO: 99.7 FL — SIGNIFICANT CHANGE UP (ref 80–100)
MONOCYTES # BLD AUTO: 0.64 K/UL — SIGNIFICANT CHANGE UP (ref 0–0.9)
MONOCYTES NFR BLD AUTO: 5.5 % — SIGNIFICANT CHANGE UP (ref 2–14)
NEUTROPHILS # BLD AUTO: 10.59 K/UL — HIGH (ref 1.8–7.4)
NEUTROPHILS NFR BLD AUTO: 91.2 % — HIGH (ref 43–77)
NRBC # BLD: 0 /100 WBCS — SIGNIFICANT CHANGE UP (ref 0–0)
NRBC # BLD: 0 /100 WBCS — SIGNIFICANT CHANGE UP (ref 0–0)
NRBC # FLD: 0 K/UL — SIGNIFICANT CHANGE UP (ref 0–0)
NRBC # FLD: 0 K/UL — SIGNIFICANT CHANGE UP (ref 0–0)
PHOSPHATE SERPL-MCNC: 4.1 MG/DL — SIGNIFICANT CHANGE UP (ref 2.5–4.5)
PLATELET # BLD AUTO: 198 K/UL — SIGNIFICANT CHANGE UP (ref 150–400)
PLATELET # BLD AUTO: 200 K/UL — SIGNIFICANT CHANGE UP (ref 150–400)
POTASSIUM SERPL-MCNC: 4.7 MMOL/L — SIGNIFICANT CHANGE UP (ref 3.5–5.3)
POTASSIUM SERPL-SCNC: 4.7 MMOL/L — SIGNIFICANT CHANGE UP (ref 3.5–5.3)
PROT SERPL-MCNC: 8.4 G/DL — HIGH (ref 6–8.3)
RBC # BLD: 3.1 M/UL — LOW (ref 3.8–5.2)
RBC # BLD: 3.29 M/UL — LOW (ref 3.8–5.2)
RBC # FLD: 13.1 % — SIGNIFICANT CHANGE UP (ref 10.3–14.5)
RBC # FLD: 13.2 % — SIGNIFICANT CHANGE UP (ref 10.3–14.5)
SODIUM SERPL-SCNC: 133 MMOL/L — LOW (ref 135–145)
WBC # BLD: 11.61 K/UL — HIGH (ref 3.8–10.5)
WBC # BLD: 9.85 K/UL — SIGNIFICANT CHANGE UP (ref 3.8–10.5)
WBC # FLD AUTO: 11.61 K/UL — HIGH (ref 3.8–10.5)
WBC # FLD AUTO: 9.85 K/UL — SIGNIFICANT CHANGE UP (ref 3.8–10.5)

## 2023-05-19 PROCEDURE — 99233 SBSQ HOSP IP/OBS HIGH 50: CPT

## 2023-05-19 PROCEDURE — 99497 ADVNCD CARE PLAN 30 MIN: CPT | Mod: 25

## 2023-05-19 PROCEDURE — 99222 1ST HOSP IP/OBS MODERATE 55: CPT

## 2023-05-19 PROCEDURE — 99223 1ST HOSP IP/OBS HIGH 75: CPT

## 2023-05-19 PROCEDURE — 73701 CT LOWER EXTREMITY W/DYE: CPT | Mod: 26,RT

## 2023-05-19 PROCEDURE — 99232 SBSQ HOSP IP/OBS MODERATE 35: CPT

## 2023-05-19 RX ORDER — LANOLIN ALCOHOL/MO/W.PET/CERES
6 CREAM (GRAM) TOPICAL AT BEDTIME
Refills: 0 | Status: DISCONTINUED | OUTPATIENT
Start: 2023-05-19 | End: 2023-05-31

## 2023-05-19 RX ORDER — ZOLPIDEM TARTRATE 10 MG/1
5 TABLET ORAL AT BEDTIME
Refills: 0 | Status: DISCONTINUED | OUTPATIENT
Start: 2023-05-19 | End: 2023-05-21

## 2023-05-19 RX ORDER — NIFEDIPINE 30 MG
60 TABLET, EXTENDED RELEASE 24 HR ORAL DAILY
Refills: 0 | Status: DISCONTINUED | OUTPATIENT
Start: 2023-05-19 | End: 2023-05-19

## 2023-05-19 RX ORDER — NIFEDIPINE 30 MG
60 TABLET, EXTENDED RELEASE 24 HR ORAL DAILY
Refills: 0 | Status: DISCONTINUED | OUTPATIENT
Start: 2023-05-19 | End: 2023-05-31

## 2023-05-19 RX ORDER — NIFEDIPINE 30 MG
60 TABLET, EXTENDED RELEASE 24 HR ORAL ONCE
Refills: 0 | Status: COMPLETED | OUTPATIENT
Start: 2023-05-19 | End: 2023-05-19

## 2023-05-19 RX ADMIN — DAPTOMYCIN 112 MILLIGRAM(S): 500 INJECTION, POWDER, LYOPHILIZED, FOR SOLUTION INTRAVENOUS at 23:08

## 2023-05-19 RX ADMIN — OXYCODONE HYDROCHLORIDE 10 MILLIGRAM(S): 5 TABLET ORAL at 17:17

## 2023-05-19 RX ADMIN — HEPARIN SODIUM 1400 UNIT(S)/HR: 5000 INJECTION INTRAVENOUS; SUBCUTANEOUS at 19:23

## 2023-05-19 RX ADMIN — Medication 0.5 MILLIGRAM(S): at 22:20

## 2023-05-19 RX ADMIN — HEPARIN SODIUM 1400 UNIT(S)/HR: 5000 INJECTION INTRAVENOUS; SUBCUTANEOUS at 20:38

## 2023-05-19 RX ADMIN — Medication 1 PATCH: at 13:19

## 2023-05-19 RX ADMIN — OXYCODONE HYDROCHLORIDE 10 MILLIGRAM(S): 5 TABLET ORAL at 11:45

## 2023-05-19 RX ADMIN — Medication 1 PATCH: at 19:06

## 2023-05-19 RX ADMIN — OXYCODONE HYDROCHLORIDE 10 MILLIGRAM(S): 5 TABLET ORAL at 00:06

## 2023-05-19 RX ADMIN — Medication 1 APPLICATION(S): at 17:18

## 2023-05-19 RX ADMIN — Medication 60 MILLIGRAM(S): at 11:04

## 2023-05-19 RX ADMIN — HEPARIN SODIUM 2000 UNIT(S): 5000 INJECTION INTRAVENOUS; SUBCUTANEOUS at 19:26

## 2023-05-19 RX ADMIN — OXYCODONE HYDROCHLORIDE 10 MILLIGRAM(S): 5 TABLET ORAL at 05:43

## 2023-05-19 RX ADMIN — OXYCODONE HYDROCHLORIDE 10 MILLIGRAM(S): 5 TABLET ORAL at 18:00

## 2023-05-19 RX ADMIN — HEPARIN SODIUM 1300 UNIT(S)/HR: 5000 INJECTION INTRAVENOUS; SUBCUTANEOUS at 02:51

## 2023-05-19 RX ADMIN — Medication 1 APPLICATION(S): at 05:43

## 2023-05-19 RX ADMIN — Medication 6 MILLIGRAM(S): at 22:20

## 2023-05-19 RX ADMIN — HEPARIN SODIUM 1300 UNIT(S)/HR: 5000 INJECTION INTRAVENOUS; SUBCUTANEOUS at 08:17

## 2023-05-19 RX ADMIN — OXYCODONE HYDROCHLORIDE 10 MILLIGRAM(S): 5 TABLET ORAL at 11:04

## 2023-05-19 RX ADMIN — Medication 0.5 MILLIGRAM(S): at 10:38

## 2023-05-19 RX ADMIN — OXYCODONE HYDROCHLORIDE 10 MILLIGRAM(S): 5 TABLET ORAL at 06:13

## 2023-05-19 RX ADMIN — HEPARIN SODIUM 1300 UNIT(S)/HR: 5000 INJECTION INTRAVENOUS; SUBCUTANEOUS at 13:57

## 2023-05-19 RX ADMIN — HEPARIN SODIUM 4500 UNIT(S): 5000 INJECTION INTRAVENOUS; SUBCUTANEOUS at 02:52

## 2023-05-19 NOTE — PHYSICAL THERAPY INITIAL EVALUATION ADULT - MANUAL MUSCLE TESTING RESULTS, REHAB EVAL
Bilateral UE grossly >/= 3/5, bilateral LE grossly >/= 3+/5, left LE > right LE/grossly assessed due to

## 2023-05-19 NOTE — PROGRESS NOTE ADULT - PROBLEM SELECTOR PLAN 4
-LAI on admission likely i/s/o sepsis, Cr. 2.06   -s/p 3L in ED  > trend qd  > avoid nephrotoxic agents, renally dose meds

## 2023-05-19 NOTE — CONSULT NOTE ADULT - PROBLEM SELECTOR RECOMMENDATION 3
- Pt endorses 10/10 right hip pain - current pain regimen (Oxycodone 10mg PRN  / Oxycodone 5mg PRN) not helpful, hx of opioid abuse - ISTOP noted for Morphine, Percocet, Vicodin from multiple providers   - Followed by chronic pain last admission - would recommended consulting again to adjust regimen  - PRN Narcan   - Bowel regimen while on opioids

## 2023-05-19 NOTE — PHYSICAL THERAPY INITIAL EVALUATION ADULT - GENERAL OBSERVATIONS, REHAB EVAL
Pt received semi-supine in bed, with all lines intact, NAD, 2L O2 via NC, all precautions maintained.

## 2023-05-19 NOTE — DISCHARGE NOTE PROVIDER - NSDCMRMEDTOKEN_GEN_ALL_CORE_FT
Albuterol (Eqv-ProAir HFA) 90 mcg/inh inhalation aerosol: 2 inhaled every 6 hours as needed for  shortness of breath and/or wheezing  Ambien 10 mg oral tablet: 1 tab(s) orally once a day (at bedtime)  butalbital-acetaminophen-caffeine 50 mg-325 mg-40 mg tablet:   clonazePAM 0.5 mg oral tablet: 1 tab(s) orally every 8 hours As needed for anxiety  furosemide 40 mg oral tablet: 1 tab(s) orally once a day  gabapentin 400 mg oral capsule: 1 cap(s) orally 3 times a day  hydrALAZINE 100 mg oral tablet: 1 tab(s) orally every 8 hours  Lomotil 2.5 mg-0.025 mg oral tablet: 2 orally  losartan 100 mg oral tablet: 1 tab(s) orally once a day  mirtazapine 7.5 mg oral tablet: 1 tab(s) orally once a day (at bedtime)  Percocet 5 mg-325 mg oral tablet: 2 tab(s) orally every 4 hours as needed for  severe pain  Procardia XL 30 mg oral tablet, extended release: 1 orally once a day   Albuterol (Eqv-ProAir HFA) 90 mcg/inh inhalation aerosol: 2 inhaled every 6 hours as needed for  shortness of breath and/or wheezing  clonazePAM 0.5 mg oral tablet: 1 tab(s) orally every 12 hours  doxycycline monohydrate 50 mg oral capsule: 2 cap(s) orally every 12 hours  gabapentin 100 mg oral capsule: 1 cap(s) orally every 8 hours  losartan 25 mg oral tablet: 1 tab(s) orally once a day  melatonin 3 mg oral tablet: 2 tab(s) orally once a day (at bedtime)  nicotine 14 mg/24 hr transdermal film, extended release: 1 patch transdermal once a day  NIFEdipine 60 mg oral tablet, extended release: 1 tab(s) orally once a day  Percocet 5 mg-325 mg oral tablet: 2 tab(s) orally every 4 hours as needed for  severe pain  rivaroxaban 15 mg oral tablet: 1 tab(s) orally 2 times a day (with meals) Loading dose (15mg twice a day) started on 5/24/23. Take loading dose for total 21 days (from 5/24/23). Start maintenance dose 20mg once in the evening thereafter  senna leaf extract oral tablet: 2 tab(s) orally once a day (at bedtime)  zolpidem 5 mg oral tablet: 1 tab(s) orally once a day (at bedtime) As needed Insomnia

## 2023-05-19 NOTE — CONSULT NOTE ADULT - ASSESSMENT
78 Y/O F PMH Alcohol abuse, Anxiety, CAD (coronary artery disease), Emphysema, HLD, HTN, Migraine, MRSA Bacteremia, Seizure disorder was brought in for change in mental status. Hx obtained from chart review; limited hx from  from phone as  w/ hx of CVA.   Pt was recently admitted to [facility] for R hip pain; has had multiple hip surgeries in the past. During the admission, c/f recurrence of MRSA R hip chronic OM based on R hip aspiration. Pt was managed with daptomycin to complete at rehab until 5/14. Pt was discharged to home recently but pt unable to walk or perform any ADLs. Pt also talking less than usual. Per chart review, pt has a habit of taking too much medication. She is on klonipin and ambien at home, also on opioids.     EMS gave narcan x1. In the ED, pt only responsive to pain. Given another narcan and became more arousable. Pt also w/ fever and tachycardia. C/f sepsis, given 2L bolus and started on broad spectrum abx. Admitted to medicine for further management. Palliative care consulted for GOC.

## 2023-05-19 NOTE — PROGRESS NOTE ADULT - ASSESSMENT
77 year old with history of chronic osteomyelitis of the right hip with prior MRSA infection/ bacteremia. She presented with altered mental status    1) Periprosthetic chronic OM  prior Cultures with MRSA    Treatment is surgical   On exam, there is warmth and induration over the right hip.  She also has pain with movement of the right ankle    She still has induration and warmth over the right hip in spite of 6 weeks of iv antibiotics.   She has failed attempted treatment with antibiotics.     Can continue Daptomycin for now- change to daily    Get culture results from [facility] to see if there is an oral option for chronic suppression. '    If her MRSA is sensitive to doxycyline can change daptomycin to doxycyline 100 mg po q 12 indefinitely    Follow up with Dr Bauman as an outpt     I would advise a second surgical opinion as pt has failed an attempt at salvage with antibiotics.  If surgery is not possible, consider palliative approach    Chronic suppression should be part of a palliative approach if the patient is not a surgical candidate.     Regardless of imaging findings, she needs an evaluation by an orthopedic surgeon    2) Podiatry eval of the right lateral malleolar ulcer    3) Change in mental status  Likely multifactorial     Discussed with pr daughter    Call ID service with additional questions or when we have culture data from [place]

## 2023-05-19 NOTE — CONSULT NOTE ADULT - NS ATTEND AMEND GEN_ALL_CORE FT
76 Y/O F PMH Alcohol abuse, Anxiety, CAD (coronary artery disease), Emphysema, HLD, HTN, Migraine, hx of MRSA Bacteremia, Seizure disorder was brought in for change in mental status. Concern for sepsis, complicated by septic encephalopathy vs drug induced encephalopathy. Palliative team consulted for complex decision making in setting of advanced illness.     Patient seen with Palliative NP. Patient able to explain reason for hospitalization but unable to elicit her understanding of risks and benefits of pursuing or forgoing treatment options. She was difficult to re-direct but did state that medical teams can speak to her daughterBrittni.   > Pain: would consult chronic pain as they saw patient during prior admission   > Appreciate ID recs   > Pending Ortho recs. Reviewed Ortho recs from prior hospitalization regarding Girdlestone Resection Athroplasty- would be palliative procedure to improve patient's pain and hellp with infection control. At that time (4/20/23), discussion was held with patient's daughter.   > GOC: Patient remains a full code. Ongoing goc discussions. Attempted to call daughterBrittni [phone], left voicemail void of PHI.   > Case discussed with Dr. Bacon (Medicine attending)

## 2023-05-19 NOTE — CONSULT NOTE ADULT - PROBLEM SELECTOR RECOMMENDATION 9
-On admission, pt w/ fever to 102 and tachycardia  -Exam c/f RLE cellulitis. Also hx of R hip OM 2/2 MRSA  - Pending ortho consult for RLE cellulitis - awaiting CT of RLE   - ID following - on abx  - Managed by primary team

## 2023-05-19 NOTE — CONSULT NOTE ADULT - SUBJECTIVE AND OBJECTIVE BOX
[facility] [place] Geriatrics and Palliative Care  Siobhan Pantoja, Palliative Care Nurse Practitioner  Contact Info: Page [phone] (Including Nights/Weekends), Message on Microsoft Teams (Siobhan Pantoja), or leave VM at Palliative Office [phone] (non-urgent)    Date of [id number] @ 11:36    HPI:  78 Y/O F PMH Alcohol abuse, Anxiety, CAD (coronary artery disease), Emphysema, HLD, HTN, Migraine, MRSA Bacteremia, Seizure disorder was brought in for change in mental status. Hx obtained from chart review; limited hx from  from phone as  w/ hx of CVA    Pt was recently admitted to [facility] for R hip pain; has had multiple hip surgeries in the past. During the admission, c/f recurrence of MRSA R hip chronic OM based on R hip aspiration. Pt was managed with daptomycin to complete at rehab until 5/14. Pt was discharged to home recently but pt unable to walk or perform any ADLs. Pt also talking less than usual. Per chart review, pt has a habit of taking too much medication. She is on klonipin and ambien at home, also on opioids.     On evaluation, pt arousable, but somnolent. Denies any complaints. Spoke w/ covering nurse who notified provider she received pt like this. Protecting airway.     Unable to obtain any further hx from pt or . Attempted to call daughter, but unable to reach. Unable to confirm current meds.     ED course:  EMS gave narcan x1. In the ED, pt only responsive to pain. Given another narcan and became more arousable. Pt also w/ fever and tachycardia. C/f sepsis, given 2L bolus and started on broad spectrum abx. Admitted to medicine for further management (17 May 2023 02:07)    PERTINENT PM/SXH:   Hypertension    Hyperlipidemia    CAD (coronary artery disease)    Migraine    Anxiety    Emphysema, unspecified    HTN (hypertension)    Anxiety    Coronary artery disease    Stented coronary artery    Seizure    Alcohol abuse    Migraine    Pain of right hip joint    MRSA bacteremia    Essential hypertension    CAD (coronary artery disease)    Elevated brain natriuretic peptide (BNP) level      S/P bladder repair    No significant past surgical history    Status post total hip replacement, right    History of arthroplasty of right hip        -------------------------------------------------------------------------------------------------------    FAMILY HISTORY:  Family history of coronary artery disease in daughter (Child)    Family history of essential hypertension      Family Hx substance abuse [ ]yes [ ]no  ITEMS NOT CHECKED ARE NOT PRESENT    SOCIAL HISTORY:   Significant other/partner[X ]  Children[ X]  Mandaen/Spirituality:  Substance hx:  [X ]   Tobacco hx:  [ ]   Alcohol hx: [ X]   Home Opioid hx:  [X ] I-Stop Reference No: [id number]  Living Situation: [X ]Home  [ ]Long term care  [ ]Rehab [ ]Other    BASELINE (I)ADL(s) (prior to admission):  [place]: [ ]Total  [ ] Moderate [ ]Dependent    -------------------------------------------------------------------------------------------------------  ADVANCE DIRECTIVES:    DNR/MOLST  [ ]  Living Will  [ ]   DECISION MAKER(s):  [ ] Health Care Proxy(s)  [ X] Surrogate(s)  [ ] Guardian           Name(s): Brittni (daughter) Phone Number(s):[phone]    -------------------------------------------------------------------------------------------------------  Allergies    No Known Allergies    Intolerances    MEDICATIONS  (STANDING):  albuterol/ipratropium for Nebulization 3 milliLiter(s) Nebulizer every 6 hours  bacitracin   Ointment 1 Application(s) Topical two times a day  clonazePAM  Tablet 0.5 milliGRAM(s) Oral every 12 hours  DAPTOmycin IVPB 300 milliGRAM(s) IV Intermittent every 24 hours  heparin  Infusion.  Unit(s)/Hr (11 mL/Hr) IV Continuous <Continuous>  nicotine -  14 mG/24Hr(s) Patch 1 Patch Transdermal daily  NIFEdipine XL 60 milliGRAM(s) Oral daily    MEDICATIONS  (PRN):  heparin   Injectable 4500 Unit(s) IV Push every 6 hours PRN For aPTT less than 40  heparin   Injectable 2000 Unit(s) IV Push every 6 hours PRN For aPTT between 40 - 57  oxyCODONE    IR 5 milliGRAM(s) Oral every 6 hours PRN Moderate Pain (4 - 6)  oxyCODONE    IR 10 milliGRAM(s) Oral every 6 hours PRN Severe Pain (7 - 10)    PRESENT SYMPTOMS: [ ]Unable to self-report  [ ] CPOT [ ] PAINADs [ ] RDOS  Source if other than patient:  [ ]Family   [ ]Team     Pain: [x]yes [ ]no  QOL impact - unable to walk  Location -right hip/ leg                Aggravating factors - movement   Quality - sharp  Radiation - none  Timing- constant   Severity (0-10 scale): 10  Minimal acceptable level (0-10 scale)/Pain goal: 0    CPOT:    https://www.[facility].org/getattachment/[email or web address]/Critical-Care-Pain-Observation-Tool-(CPOT)    PAINAD Score: See PAINAD tool and score below       RDOS: See RDOS tool and score below   0 to 2  minimal or no respiratory distress   3  mild distress  4 to 6 moderate distress  >7 severe distress    Dyspnea:                           [ ]Mild [X ]Moderate [ ]Severe  Anxiety:                             [ ]Mild [ X]Moderate [ ]Severe  Fatigue:                             [ ]Mild [ ]Moderate [ ]Severe  Nausea:                             [ ]Mild [ ]Moderate [ ]Severe  Loss of appetite:              [ ]Mild [ ]Moderate [ ]Severe  Constipation:                    [ ]Mild [ ]Moderate [ ]Severe  Other Symptoms:  [X ]All other review of systems negative     -------------------------------------------------------------------------------------------------------  PCSSQ[Palliative Care Spiritual Screening Question]   Severity (0-10):  Score of 4 or > indicate consideration of Chaplaincy referral.  Chaplaincy Referral: [ ] yes [X ] refused [ ] following [ ] Deferred     Caregiver [place]? : [ ] yes [ ] no [ ] Deferred [X ] Declined             Social work referral [ ] Patient & Family Centered Care Referral [ ]     Anticipatory Grief present?:  [ ] yes [ ] no  [X ] Deferred                  Social work referral [ ] Chaplaincy Referral [ ]      -------------------------------------------------------------------------------------------------------  PHYSICAL EXAM:  Vital Signs Last 24 Hrs  T(C): 36.7 (19 May 2023 09:30), Max: 37.4 (18 May 2023 22:02)  T(F): 98.1 (19 May 2023 09:30), Max: 99.4 (18 May 2023 22:02)  HR: 87 (19 May 2023 09:30) (82 - 100)  BP: 189/90 (19 May 2023 09:30) (122/75 - 189/90)  BP(mean): --  RR: 18 (19 May 2023 09:30) (18 - 19)  SpO2: 93% (19 May 2023 09:30) (93% - 98%)    Parameters below as of 19 May 2023 09:30  Patient On (Oxygen Delivery Method): nasal cannula  O2 Flow (L/min): 2   I&O's Summary    18 May 2023 07:01  -  19 May 2023 07:00  --------------------------------------------------------  IN: 1320 mL / OUT: 1450 mL / NET: -130 mL    19 May 2023 07:01  -  19 May 2023 11:36  --------------------------------------------------------  IN: 240 mL / OUT: 0 mL / NET: 240 mL        GENERAL:  [ ]Cachexia  [X ]Alert  [X ]Oriented x3   [ ]Lethargic  [ ]Unarousable  [ X]Verbal  [ ]Non-Verbal    Behavioral:   [ X] Anxiety  [ ] Delirium [X ] Agitation [ ] Other    HEENT:  [ X]Normal   [ ]Dry mouth   [ ]ET Tube/Trach  [ ]Oral lesions    PULMONARY:   [ ]Clear [ ]Tachypnea  [ ]Audible excessive secretions   [ ]Rhonchi        [ ]Right [ ]Left [ ]Bilateral  [ ]Crackles        [ ]Right [ ]Left [ ]Bilateral  [ ]Wheezing     [ ]Right [ ]Left [ ]Bilateral  [X ]Diminished breath sounds [ ]right [ ]left [ ]bilateral    CARDIOVASCULAR:    [X ]Regular [ ]Irregular [ ]Tachy  [ ]Paul [ ]Murmur [ ]Other    GASTROINTESTINAL:  [X ]Soft  [ ]Distended   [X ]+BS  [X ]Non tender [ ]Tender  [ ]Other [ ]PEG [ ]OGT/ NGT  Last BM: 5/19    GENITOURINARY:  [X ]Normal [ ] Incontinent   [ ]Oliguria/Anuria   [ ]Silva    MUSCULOSKELETAL:   [ ]Normal   [ ]Weakness  [X ]Bed/Wheelchair bound [ ]Edema    NEUROLOGIC:   [X ]No focal deficits  [ ]Cognitive impairment  [ ]Dysphagia [ ]Dysarthria [ ]Paresis [ ]Other     SKIN:   [ ]Normal  [ ]Rash  [X ]Other BLLE wound, RLE cellulitis   [ ]Pressure ulcer(s)       Present on admission [ ]y [ ]n    -------------------------------------------------------------------------------------------------------  CRITICAL CARE:  [ ] Shock Present  [ ]Septic [ ]Cardiogenic [ ]Neurologic [ ]Hypovolemic  [ ]  Vasopressors [ ]  Inotropes   [ ]Respiratory failure present [ ]Mechanical ventilation [ ]Non-invasive ventilatory support [ ]High flow    [ ]Acute  [ ]Chronic [ ]Hypoxic  [ ]Hypercarbic [ ]Other  [ ]Other organ failure     -------------------------------------------------------------------------------------------------------  LABS:                        9.8    11.61 )-----------( 200      ( 19 May 2023 05:30 )             32.8   05-19    133<L>  |  97<L>  |  28<H>  ----------------------------<  169<H>  4.7   |  20<L>  |  0.77    Ca    8.7      19 May 2023 05:30  Phos  4.1     05-19  Mg     1.80     05-19    TPro  8.4<H>  /  Alb  3.3  /  TBili  0.4  /  DBili  x   /  AST  39<H>  /  ALT  13  /  AlkPhos  76  05-19  PTT - ( 19 May 2023 08:59 )  PTT:72.3 sec      -------------------------------------------------------------------------------------------------------  RADIOLOGY & ADDITIONAL STUDIES:< from: CT Head No Cont (05.17.23 @ 05:47) >  IMPRESSION: No acute intracranial hemorrhage, mass effect, or shift of   the midline structures.    --- End of Report ---    -------------------------------------------------------------------------------------------------------  PROTEIN CALORIE MALNUTRITION PRESENT: [ ]mild [ ]moderate [ ]severe [ ]underweight [ ]morbid obesity  https://www.andBroadcastr.org/MemberPass/2440/web/files/ONC/Table_Clinical%20Characteristics%20to%20Document%20Malnutrition-White%20JV%20et%20al%202012.pdf    Height (cm): 160 (05-16-23 @ 20:09), 160 (04-19-23 @ 17:05)  Weight (kg): 57.5 (05-18-23 @ 06:14), 49.9 (04-19-23 @ 17:05)  BMI (kg/m2): 22.5 (05-18-23 @ 06:14), 19.5 (05-16-23 @ 20:09), 19.5 (04-19-23 @ 17:05)    Palliative Performance Status Version 2:   See PPSv2 tool and score below          [ ]PPSV2 < or = to 30% [ ]significant weight loss  [ ]poor nutritional intake  [ ]anasarca[ ]Artificial Nutrition      -------------------------------------------------------------------------------------------------------  Other REFERRALS:  [ ]Hospice  [ ]Child Life  [ ]Social Work  [ ]Case management [ ]Holistic Therapy     -------------------------------------------------------------------------------------------------------  Goals of Care Document:  [facility] Geriatrics and Palliative Care  Siobhan Pantoja, Palliative Care Nurse Practitioner  Contact Info: Page [phone] (Including Nights/Weekends), Message on Microsoft Teams (Siobhan Pantoja), or leave VM at Palliative Office [phone] (non-urgent)    Date of [id number] @ 11:36    HPI: 76 Y/O F PMH Alcohol abuse, Anxiety, CAD (coronary artery disease), Emphysema, HLD, HTN, Migraine, MRSA Bacteremia, Seizure disorder was brought in for change in mental status. Hx obtained from chart review; limited hx from  from phone as  w/ hx of CVA    Pt was recently admitted to [facility] for R hip pain; has had multiple hip surgeries in the past. During the admission, c/f recurrence of MRSA R hip chronic OM based on R hip aspiration. Pt was managed with daptomycin to complete at rehab until 5/14. Pt was discharged to home recently but pt unable to walk or perform any ADLs. Pt also talking less than usual. Per chart review, pt has a habit of taking too much medication. She is on klonipin and ambien at home, also on opioids.     On evaluation, pt arousable, but somnolent. Denies any complaints. Spoke w/ covering nurse who notified provider she received pt like this. Protecting airway.     Unable to obtain any further hx from pt or . Attempted to call daughter, but unable to reach. Unable to confirm current meds.     ED course:  EMS gave narcan x1. In the ED, pt only responsive to pain. Given another narcan and became more arousable. Pt also w/ fever and tachycardia. C/f sepsis, given 2L bolus and started on broad spectrum abx. Admitted to medicine for further management (17 May 2023 02:07)    PERTINENT PM/SXH:   Hypertension  Hyperlipidemia  CAD (coronary artery disease)  Migraine  Anxiety  Emphysema, unspecified  HTN (hypertension)  Anxiety  Coronary artery disease  Stented coronary artery  Seizure  Alcohol abuse  Migraine  Pain of right hip joint  MRSA bacteremia  Essential hypertension  CAD (coronary artery disease)  Elevated brain natriuretic peptide (BNP) level  S/P bladder repair    No significant past surgical history  Status post total hip replacement, right  History of arthroplasty of right hip    -------------------------------------------------------------------------------------------------------    FAMILY HISTORY:  Family history of coronary artery disease in daughter (Child)    Family history of essential hypertension      Family Hx substance abuse [ ]yes [ ]no  ITEMS NOT CHECKED ARE NOT PRESENT    SOCIAL HISTORY:   Significant other/partner[X ]  Children[ X]  Advent/Spirituality:  Substance hx:  [X ]   Tobacco hx:  [ ]   Alcohol hx: [ X]   Home Opioid hx:  [X ] I-Stop Reference No: [id number]  Living Situation: [X ]Home  [ ]Long term care  [ ]Rehab [ ]Other    BASELINE (I)ADL(s) (prior to admission):  [place]: [ ]Total  [ ] Moderate [ ]Dependent    -------------------------------------------------------------------------------------------------------  ADVANCE DIRECTIVES:    DNR/MOLST  [ ]  Living Will  [ ]   DECISION MAKER(s):  [ ] Health Care Proxy(s)  [ X] Surrogate(s)  [ ] Guardian           Name(s): Brittni (daughter) Phone Number(s):[phone]    -------------------------------------------------------------------------------------------------------  Allergies    No Known Allergies    Intolerances    MEDICATIONS  (STANDING):  albuterol/ipratropium for Nebulization 3 milliLiter(s) Nebulizer every 6 hours  bacitracin   Ointment 1 Application(s) Topical two times a day  clonazePAM  Tablet 0.5 milliGRAM(s) Oral every 12 hours  DAPTOmycin IVPB 300 milliGRAM(s) IV Intermittent every 24 hours  heparin  Infusion.  Unit(s)/Hr (11 mL/Hr) IV Continuous <Continuous>  nicotine -  14 mG/24Hr(s) Patch 1 Patch Transdermal daily  NIFEdipine XL 60 milliGRAM(s) Oral daily    MEDICATIONS  (PRN):  heparin   Injectable 4500 Unit(s) IV Push every 6 hours PRN For aPTT less than 40  heparin   Injectable 2000 Unit(s) IV Push every 6 hours PRN For aPTT between 40 - 57  oxyCODONE    IR 5 milliGRAM(s) Oral every 6 hours PRN Moderate Pain (4 - 6)  oxyCODONE    IR 10 milliGRAM(s) Oral every 6 hours PRN Severe Pain (7 - 10)    PRESENT SYMPTOMS: [ ]Unable to self-report  [ ] CPOT [ ] PAINADs [ ] RDOS  Source if other than patient:  [ ]Family   [ ]Team     Pain: [x]yes [ ]no  QOL impact - unable to walk  Location -right hip/ leg                Aggravating factors - movement   Quality - sharp  Radiation - none  Timing- constant   Severity (0-10 scale): 10  Minimal acceptable level (0-10 scale)/Pain goal: 0    CPOT:    https://www.[facility].org/getattachment/[email or web address]/Critical-Care-Pain-Observation-Tool-(CPOT)    PAINAD Score: See PAINAD tool and score below       RDOS: See RDOS tool and score below   0 to 2  minimal or no respiratory distress   3  mild distress  4 to 6 moderate distress  >7 severe distress    Dyspnea:                           [ ]Mild [X ]Moderate [ ]Severe  Anxiety:                             [ ]Mild [ X]Moderate [ ]Severe  Fatigue:                             [ ]Mild [ ]Moderate [ ]Severe  Nausea:                             [ ]Mild [ ]Moderate [ ]Severe  Loss of appetite:              [ ]Mild [ ]Moderate [ ]Severe  Constipation:                    [ ]Mild [ ]Moderate [ ]Severe  Other Symptoms:  [X ]All other review of systems negative     -------------------------------------------------------------------------------------------------------  PCSSQ[Palliative Care Spiritual Screening Question]   Severity (0-10):  Score of 4 or > indicate consideration of Chaplaincy referral.  Chaplaincy Referral: [ ] yes [X ] refused [ ] following [ ] Deferred     Caregiver [place]? : [ ] yes [ ] no [ ] Deferred [X ] Declined             Social work referral [ ] Patient & Family Centered Care Referral [ ]     Anticipatory Grief present?:  [ ] yes [ ] no  [X ] Deferred                  Social work referral [ ] Chaplaincy Referral [ ]      -------------------------------------------------------------------------------------------------------  PHYSICAL EXAM:  Vital Signs Last 24 Hrs  T(C): 36.7 (19 May 2023 09:30), Max: 37.4 (18 May 2023 22:02)  T(F): 98.1 (19 May 2023 09:30), Max: 99.4 (18 May 2023 22:02)  HR: 87 (19 May 2023 09:30) (82 - 100)  BP: 189/90 (19 May 2023 09:30) (122/75 - 189/90)  BP(mean): --  RR: 18 (19 May 2023 09:30) (18 - 19)  SpO2: 93% (19 May 2023 09:30) (93% - 98%)    Parameters below as of 19 May 2023 09:30  Patient On (Oxygen Delivery Method): nasal cannula  O2 Flow (L/min): 2   I&O's Summary    18 May 2023 07:01  -  19 May 2023 07:00  --------------------------------------------------------  IN: 1320 mL / OUT: 1450 mL / NET: -130 mL    19 May 2023 07:01  -  19 May 2023 11:36  --------------------------------------------------------  IN: 240 mL / OUT: 0 mL / NET: 240 mL        GENERAL:  [ ]Cachexia  [X ]Alert  [X ]Oriented x3   [ ]Lethargic  [ ]Unarousable  [ X]Verbal  [ ]Non-Verbal    Behavioral:   [ X] Anxiety  [ ] Delirium [X ] Agitation [ ] Other    HEENT:  [ X]Normal   [ ]Dry mouth   [ ]ET Tube/Trach  [ ]Oral lesions    PULMONARY:   [ ]Clear [ ]Tachypnea  [ ]Audible excessive secretions   [ ]Rhonchi        [ ]Right [ ]Left [ ]Bilateral  [ ]Crackles        [ ]Right [ ]Left [ ]Bilateral  [ ]Wheezing     [ ]Right [ ]Left [ ]Bilateral  [X ]Diminished breath sounds [ ]right [ ]left [ ]bilateral    CARDIOVASCULAR:    [X ]Regular [ ]Irregular [ ]Tachy  [ ]Paul [ ]Murmur [ ]Other    GASTROINTESTINAL:  [X ]Soft  [ ]Distended   [X ]+BS  [X ]Non tender [ ]Tender  [ ]Other [ ]PEG [ ]OGT/ NGT  Last BM: 5/19    GENITOURINARY:  [X ]Normal [ ] Incontinent   [ ]Oliguria/Anuria   [ ]Silva    MUSCULOSKELETAL:   [ ]Normal   [ ]Weakness  [X ]Bed/Wheelchair bound [ ]Edema    NEUROLOGIC:   [X ]No focal deficits  [ ]Cognitive impairment  [ ]Dysphagia [ ]Dysarthria [ ]Paresis [ ]Other     SKIN:   [ ]Normal  [ ]Rash  [X ]Other BLLE wound, RLE cellulitis   [ ]Pressure ulcer(s)       Present on admission [ ]y [ ]n    -------------------------------------------------------------------------------------------------------  CRITICAL CARE:  [ ] Shock Present  [ ]Septic [ ]Cardiogenic [ ]Neurologic [ ]Hypovolemic  [ ]  Vasopressors [ ]  Inotropes   [ ]Respiratory failure present [ ]Mechanical ventilation [ ]Non-invasive ventilatory support [ ]High flow    [ ]Acute  [ ]Chronic [ ]Hypoxic  [ ]Hypercarbic [ ]Other  [ ]Other organ failure     -------------------------------------------------------------------------------------------------------  LABS:                        9.8    11.61 )-----------( 200      ( 19 May 2023 05:30 )             32.8   05-19    133<L>  |  97<L>  |  28<H>  ----------------------------<  169<H>  4.7   |  20<L>  |  0.77    Ca    8.7      19 May 2023 05:30  Phos  4.1     05-19  Mg     1.80     05-19    TPro  8.4<H>  /  Alb  3.3  /  TBili  0.4  /  DBili  x   /  AST  39<H>  /  ALT  13  /  AlkPhos  76  05-19  PTT - ( 19 May 2023 08:59 )  PTT:72.3 sec      -------------------------------------------------------------------------------------------------------  RADIOLOGY & ADDITIONAL STUDIES:< from: CT Head No Cont (05.17.23 @ 05:47) >  IMPRESSION: No acute intracranial hemorrhage, mass effect, or shift of   the midline structures.    --- End of Report ---    -------------------------------------------------------------------------------------------------------  PROTEIN CALORIE MALNUTRITION PRESENT: [ ]mild [ ]moderate [ ]severe [ ]underweight [ ]morbid obesity  https://www.[employer].org/[employer]/2440/web/files/ONC/Table_Clinical%20Characteristics%20to%20Document%20Malnutrition-White%20JV%20et%20al%202012.pdf    Height (cm): 160 (05-16-23 @ 20:09), 160 (04-19-23 @ 17:05)  Weight (kg): 57.5 (05-18-23 @ 06:14), 49.9 (04-19-23 @ 17:05)  BMI (kg/m2): 22.5 (05-18-23 @ 06:14), 19.5 (05-16-23 @ 20:09), 19.5 (04-19-23 @ 17:05)    Palliative Performance Status Version 2:   See PPSv2 tool and score below          [ ]PPSV2 < or = to 30% [ ]significant weight loss  [ ]poor nutritional intake  [ ]anasarca[ ]Artificial Nutrition      -------------------------------------------------------------------------------------------------------  Other REFERRALS:  [ ]Hospice  [ ]Child Life  [ ]Social Work  [ ]Case management [ ]Holistic Therapy     -------------------------------------------------------------------------------------------------------  Goals of Care Document:

## 2023-05-19 NOTE — PROGRESS NOTE ADULT - SUBJECTIVE AND OBJECTIVE BOX
Jarod Diaz  PGY-1 Resident Physician     Patient is a 77y old  Female who presents with a chief complaint of AMS (18 May 2023 15:03)    SUBJECTIVE / OVERNIGHT EVENTS:  NAEON.      MEDICATIONS  (STANDING):  albuterol/ipratropium for Nebulization 3 milliLiter(s) Nebulizer every 6 hours  bacitracin   Ointment 1 Application(s) Topical two times a day  clonazePAM  Tablet 0.5 milliGRAM(s) Oral every 12 hours  DAPTOmycin IVPB 300 milliGRAM(s) IV Intermittent every 24 hours  heparin  Infusion.  Unit(s)/Hr (11 mL/Hr) IV Continuous <Continuous>  nicotine -  14 mG/24Hr(s) Patch 1 Patch Transdermal daily    MEDICATIONS  (PRN):  heparin   Injectable 2000 Unit(s) IV Push every 6 hours PRN For aPTT between 40 - 57  heparin   Injectable 4500 Unit(s) IV Push every 6 hours PRN For aPTT less than 40  oxyCODONE    IR 5 milliGRAM(s) Oral every 6 hours PRN Moderate Pain (4 - 6)  oxyCODONE    IR 10 milliGRAM(s) Oral every 6 hours PRN Severe Pain (7 - 10)    Allergies    No Known Allergies    Intolerances        Vital Signs Last 24 Hrs  T(C): 36.7 (19 May 2023 05:43), Max: 37.4 (18 May 2023 22:02)  T(F): 98.1 (19 May 2023 05:43), Max: 99.4 (18 May 2023 22:02)  HR: 99 (19 May 2023 05:43) (82 - 100)  BP: 179/76 (19 May 2023 05:43) (111/79 - 179/76)  BP(mean): --  RR: 18 (19 May 2023 05:43) (18 - 19)  SpO2: 94% (19 May 2023 05:43) (93% - 98%)    Parameters below as of 19 May 2023 05:43  Patient On (Oxygen Delivery Method): nasal cannula  O2 Flow (L/min): 2    Daily     Daily   I&O's Summary    18 May 2023 07:01  -  19 May 2023 07:00  --------------------------------------------------------  IN: 1320 mL / OUT: 1450 mL / NET: -130 mL        Physical Exam  Constitutional: NAD; somnolent, arousable  Head: NC/AT  Eyes: pupils sluggish reactive to light, anicteric sclera  ENT: no nasal discharge; dry MM  Neck: supple; no JVD  Respiratory: CTA B/L; no W/R/R  Cardiac: systolic ejection murmur, RRR  Gastrointestinal: soft, NT/ND; no rebound or guarding  Extremities: WWP, no clubbing or cyanosis; no peripheral edema.  Musculoskeletal: RLE erythema, no warmth  Vascular: 2+ radial, DP/PT pulses B/L  Dermatologic: skin warm, dry  Neuro: A&Ox4     DIAGNOSTICS:                         9.8    11.61 )-----------( 200      ( 19 May 2023 05:30 )             32.8     Hgb Trend: 9.8<--, 9.4<--, 8.7<--, 8.2<--, 10.0<--  05-18    141  |  104  |  38<H>  ----------------------------<  98  3.8   |  25  |  1.02    Ca    8.6      18 May 2023 03:45  Phos  3.0     05-18  Mg     2.00     05-18    TPro  7.2  /  Alb  3.0<L>  /  TBili  0.4  /  DBili  x   /  AST  26  /  ALT  9   /  AlkPhos  57  05-18    CAPILLARY BLOOD GLUCOSE        Creatinine Trend: 1.02<--, 1.06<--, 2.06<--, 0.97<--, 0.89<--, 0.92<--  LIVER FUNCTIONS - ( 18 May 2023 03:45 )  Alb: 3.0 g/dL / Pro: 7.2 g/dL / ALK PHOS: 57 U/L / ALT: 9 U/L / AST: 26 U/L / GGT: x           PTT - ( 19 May 2023 01:20 )  PTT:37.9 sec  CARDIAC MARKERS ( 18 May 2023 03:45 )  x     / x     / 370 U/L / x     / x

## 2023-05-19 NOTE — PHYSICAL THERAPY INITIAL EVALUATION ADULT - PERTINENT HX OF CURRENT PROBLEM, REHAB EVAL
78 Y/O F PMH Alcohol abuse, Anxiety, CAD (coronary artery disease), Emphysema, HLD, HTN, Migraine, MRSA Bacteremia, Seizure disorder was brought in for change in mental status. Hx obtained from chart review; limited hx from  from phone as  w/ hx of CVA  Pt was recently admitted to [facility] for R hip pain; has had multiple hip surgeries in the past. During the admission, c/f recurrence of MRSA R hip chronic OM based on R hip aspiration. Pt was managed with daptomycin to complete at rehab until 5/14. Pt was discharged to home recently but pt unable to walk or perform any ADLs. Pt also talking less than usual. Per chart review, pt has a habit of taking too much medication. She is on klonipin and ambien at home, also on opioids.

## 2023-05-19 NOTE — DISCHARGE NOTE PROVIDER - DETAILS OF MALNUTRITION DIAGNOSIS/DIAGNOSES
This patient has been assessed with a concern for Malnutrition and was treated during this hospitalization for the following Nutrition diagnosis/diagnoses:     -  05/21/2023: Mild protein-calorie malnutrition

## 2023-05-19 NOTE — PHYSICAL THERAPY INITIAL EVALUATION ADULT - ADDITIONAL COMMENTS
Unable to obtain accurate social history secondary to pt. presenting with confusion during subjective history, limited social history obtained through past medical documents, please refer to social work for more attainable social history. Pt states she lives with her  in a house and has 3 flights of steps. Prior to admission pt states she ambulated independently.   Post PT evaluation, pt left semi-supine, alarm on, call bell and remote within reach, all precautions maintained, NAD. RN aware.

## 2023-05-19 NOTE — CONSULT NOTE ADULT - CONVERSATION DETAILS
Referral to palliative care for complex decision making in setting of advanced illness. Attempted to gain insight to patients understanding of current medical issues. Pt was able to tell us that she came to [facility] for uncontrollable right hip/leg pain and recurrent infections/need for antibiotics(unable to tell us where these infections are). Attempted to redirect patient - but pt perseverates on "never coming back to [facility] again". When asked if she would want to pursue any surgical intervention if offered by ortho she said "I'm 77 do you think I could do it?". Attempted to gain insight on her home situation - she shared her  had a stroke 16 years ago which left him paralyzed on the left side, unsure if  is alert to assist in making medical decisions for pt. "is this 21 questions?" - pt became frustrated and said we can call her daughter Brittni for collateral. Emotional support provided, will attempt to call Brittni. Palliative will continue to follow.

## 2023-05-19 NOTE — CONSULT NOTE ADULT - PROBLEM SELECTOR RECOMMENDATION 2
- Pt endorses severe anxiety - on medications at home but unable to tell us what medication/dose   - Currently on Klonopin 0.5mg Q12hrs

## 2023-05-19 NOTE — PROGRESS NOTE ADULT - PROBLEM SELECTOR PLAN 1
-On admission, pt w/ fever to 102 and tachycardia  -Exam c/f RLE cellulitis. Also hx of R hip OM 2/2 MRSA  -CXR clear and UA unremarkable.   -s/p 2L in ED; give additional 1L  -s/p broad spectrum abx w/ cefepime and vanco (5/16-5/17)  > f/u blood cx  > f/u CT of right leg & hip d/t concern for progressive cellulitis   > f/u Ortho recs  > ID c/s     > c/w Dapto (5/17 - )  > noted right DVT

## 2023-05-19 NOTE — DISCHARGE NOTE PROVIDER - HOSPITAL COURSE
76 Y/O F PMH Alcohol abuse, Anxiety, CAD (coronary artery disease), Emphysema, HLD, HTN, Migraine, MRSA Bacteremia, Seizure disorder was brought in for change in mental status. Hx obtained from chart review; limited hx from  from phone as  w/ hx of CVA. Pt was recently admitted to [facility] for R hip pain; has had multiple hip surgeries in the past. During the admission, c/f recurrence of MRSA R hip chronic OM based on R hip aspiration. Pt was managed with daptomycin to complete at rehab until 5/14. Pt was discharged to home recently but pt unable to walk or perform any ADLs. Unable to obtain any further hx from pt or . Attempted to call daughter, but unable to reach. MS gave narcan x1. In the ED, pt only responsive to pain. Given another narcan and became more arousability. Pt also w/ fever and tachycardia. C/f sepsis, given 2L bolus and started on broad spectrum abx. Admitted to medicine for further management.    During admission, patient received daptomycin d/t past admission showing inconsistent vanc level. ID w/u thus far negative. CT of right leg (orderd d/t suspicion of OM vs progressive cellulitis) showed ___________________ . Hospital course also relevant for admission AMS, deemed to be 2/2 C/f septic encephalopathy vs drug induced encephalopathy vs ETOH withdrawal. AMS self resolved w/o further intervention.     Hospital course complicated by DVT, requiring hep gtt. Pt's admission LAI resolved. R/s patient's home meds for CDHF, PAD, & HTN w/o complications. On Day of d/c, pt HDS & afebrile.      78 Y/O F PMH Alcohol abuse, Anxiety, CAD (coronary artery disease), Emphysema, HLD, HTN, Migraine, MRSA Bacteremia, Seizure disorder was brought in for change in mental status. Hx obtained from chart review; limited hx from  from phone as  w/ hx of CVA. Pt was recently admitted to [facility] for R hip pain; has had multiple hip surgeries in the past. During the admission, c/f recurrence of MRSA R hip chronic OM based on R hip aspiration. Pt was managed with daptomycin to complete at rehab until 5/14. Pt was discharged to home recently but pt unable to walk or perform any ADLs. Unable to obtain any further hx from pt or . Attempted to call daughter, but unable to reach. MS gave narcan x1. In the ED, pt only responsive to pain. Given another narcan and became more arousability. Pt also w/ fever and tachycardia. C/f sepsis, given 2L bolus and started on broad spectrum abx. Admitted to medicine for further management.    During admission, patient received daptomycin d/t past admission showing inconsistent vanc level. ID w/u thus far negative. CT of right leg (orderd d/t suspicion of OM vs progressive cellulitis) showed progressive cellulitis. Hospital course also relevant for admission AMS, deemed to be 2/2 C/f septic encephalopathy vs drug induced encephalopathy vs ETOH withdrawal. AMS self resolved w/o further intervention.     Hospital course complicated by DVT, requiring hep gtt. Pt's admission LAI resolved. R/s patient's home meds for CDHF, PAD, & HTN w/o complications. On Day of d/c, pt HDS & afebrile.     Per discussion w/ ID & ortho, pt will either need to consent for prosthetic hip repair w/ ortho or be palliative w/ lifelong abx. Family decision regarding surgery is ongoing.       76 Y/O F PMH Alcohol abuse, Anxiety, CAD (coronary artery disease), Emphysema, HLD, HTN, Migraine, MRSA Bacteremia, Seizure disorder was brought in for change in mental status. Hx obtained from chart review; limited hx from  from phone as  w/ hx of CVA. Pt was recently admitted to [facility] for R hip pain; has had multiple hip surgeries in the past. During the admission, c/f recurrence of MRSA R hip chronic OM based on R hip aspiration. Pt was managed with daptomycin to complete at rehab until 5/14. Pt was discharged to home recently but pt unable to walk or perform any ADLs. Unable to obtain any further hx from pt or . Attempted to call daughter, but unable to reach. MS gave narcan x1. In the ED, pt only responsive to pain. Given another narcan and became more arousability. Pt also w/ fever and tachycardia. C/f sepsis, given 2L bolus and started on broad spectrum abx. Admitted to medicine for further management.      # AMS, deemed to be 2/2 toxic metabolic sepsis. Other possibility is drug induced encephalopathy vs ETOH withdrawal. AMS self resolved w/o further intervention.   Severe sepsis- : -On admission, pt w/ fever to 102 and tachycardia, sepsis now resolved.  Sepsis 2/2 R hip OM and overlying cellulitis.   -BCx, UCx, CXR wnl  -Prior team able to get culture data from [facility], documented in note from 5/23   s/p broad spectrum abx w/ cefepime and vanco (5/16-5/17), will now require indefinite suppressive therapy with PO doxy 100mg BID   Pt and family declining operative intervention and prefers palliative longterm abx therapy and rehab.       # Right hip pain- -Pt w/ right hip pain chronic i/s/o chronic OM vs acute OM. On lidocaine 4%, percocet based on I-STOP. P{ain regimen percocet 5/325 x 2 tab (q4 PRN) per home meds    Pain service consult reviewed, started Gabapentin 100mg q8 hrs 5/24   Abx as above.     # Right DVT, lower extremity- (+) right venous thrombus DVT (5/18)  > switched from heparin to xarelto as patient not opting for operation, and wants to minimize lab work.    # Chronic diastolic heart failure.   Pt w/ hx of diastolic HF w/ mild pulm HTN, on lasix 40mg at home, has been on hold in setting of sepsis  Echo (4/22): EF 75% w/ stage 2 diastolic function  Currently patient euvolemic  Will resum lasix as sepsis is resolved.    # HTN (hypertension)-  -Pt w/ hx of HTN, on home losartan, nifedipine, and hydralazine.  On home nifedipine 60 qday, Losartan restarted at lower dose   # PAD- -Diagnosed w/ PAD during last admission. ANDREA/PVR:  Moderate bilateral lower extremity arterial flow limitation localizing to the popliteal and infrapopliteal levels. s/p angio; no plan for surgical intervention per vascular during last admission  on Xarelto now, no report of statin therapy on med rec, lipid profile reviewed LDL 57.     # Anxiety- Pt w/ hx of anxiety, takes clonazepam 0.5 q8 and zolpidem 5mg qhs, remeron  -s/p CIWA bundle. c/w home clonazepam 0.5 TID. Spoke with daughter Brittni on 5/31- Daughter requesting adjusting home meds. As pt being planned to be dced to rehab on 5/31- Advised daughter to have pt FU with psych as outpt or at rehab to adjust meds under supervision     Updated daughter Brittni on 5/31

## 2023-05-19 NOTE — PROGRESS NOTE ADULT - SUBJECTIVE AND OBJECTIVE BOX
Follow Up:      Inverval History/ROS:Patient is a 77y old  Female who presents with a chief complaint of AMS (19 May 2023 12:59)  No fever  MS altered    Allergies    No Known Allergies    Intolerances        ANTIMICROBIALS:  DAPTOmycin IVPB 300 every 24 hours      OTHER MEDS:  albuterol/ipratropium for Nebulization 3 milliLiter(s) Nebulizer every 6 hours  bacitracin   Ointment 1 Application(s) Topical two times a day  clonazePAM  Tablet 0.5 milliGRAM(s) Oral every 12 hours  heparin   Injectable 4500 Unit(s) IV Push every 6 hours PRN  heparin   Injectable 2000 Unit(s) IV Push every 6 hours PRN  heparin  Infusion.  Unit(s)/Hr IV Continuous <Continuous>  melatonin 6 milliGRAM(s) Oral at bedtime  nicotine -  14 mG/24Hr(s) Patch 1 Patch Transdermal daily  NIFEdipine XL 60 milliGRAM(s) Oral daily  oxyCODONE    IR 5 milliGRAM(s) Oral every 6 hours PRN  oxyCODONE    IR 10 milliGRAM(s) Oral every 6 hours PRN  zolpidem 5 milliGRAM(s) Oral at bedtime PRN      Vital Signs Last 24 Hrs  T(C): 37.1 (19 May 2023 13:29), Max: 37.4 (18 May 2023 22:02)  T(F): 98.8 (19 May 2023 13:29), Max: 99.4 (18 May 2023 22:02)  HR: 88 (19 May 2023 13:29) (83 - 100)  BP: 187/80 (19 May 2023 13:29) (164/78 - 189/90)  BP(mean): --  RR: 17 (19 May 2023 13:29) (17 - 19)  SpO2: 93% (19 May 2023 13:29) (93% - 98%)    Parameters below as of 19 May 2023 13:29  Patient On (Oxygen Delivery Method): nasal cannula  O2 Flow (L/min): 2      PHYSICAL EXAM:  General: [ ] non-toxic  HEAD/EYES: [ ] PERRL [ x] white sclera [ ] icterus  ENT:  [ ] normal [x ] supple [ ] thrush [ ] pharyngeal exudate  Cardiovascular:   [ ] murmur [x ] normal [ ] PPM/AICD  Respiratory:  [x ] clear to ausculation bilaterally  GI:  [ x] soft, non-tender, normal bowel sounds  :  [ ] edmondson [x ] no CVA tenderness   Musculoskeletal:  [ ] no synovitis  Neurologic:  [ ] non-focal exam   Skin:  [x ] no rash  Lymph: [x ] no lymphadenopathy  Psychiatric:  [ ] appropriate affect [ ] alert & oriented  Lines:  [x ] no phlebitis [ ] central line                                9.8    11.61 )-----------( 200      ( 19 May 2023 05:30 )             32.8       05-19    133<L>  |  97<L>  |  28<H>  ----------------------------<  169<H>  4.7   |  20<L>  |  0.77    Ca    8.7      19 May 2023 05:30  Phos  4.1     05-19  Mg     1.80     05-19    TPro  8.4<H>  /  Alb  3.3  /  TBili  0.4  /  DBili  x   /  AST  39<H>  /  ALT  13  /  AlkPhos  76  05-19          MICROBIOLOGY:Culture Results:   <10,000 CFU/mL Normal Urogenital Mone (05-17-23 @ 00:30)  Culture Results:   No growth to date. (05-16-23 @ 23:30)  Culture Results:   No growth to date. (05-16-23 @ 23:17)      RADIOLOGY:

## 2023-05-19 NOTE — CONSULT NOTE ADULT - SUBJECTIVE AND OBJECTIVE BOX
Ms. Srivastava is a 77 year old female, past medical history of CAD, CHF, HTN, MRSA bacteremia, who presented to the [facility] Emergency Department with altered mental status. Patient was found to have continued induration and pain in the right hip. She was found to have sepsis and cellulitis of the leg. Orthopedics was consulted for her continued prosthetic joint infection.    Patient was previously admitted to [facility] with right hip pain and a prosthetic joint infection. 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 She then underwent a Right USAMA by Dr. Jaime in 2018, but was complicated by a fall and periprosthetic fracture in 2018. She underwent ORIF and revision USAMA by Dr. Be. 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 Be. Patient presented to [facility] with right hip pain and stated that she was found to have MRSA bacteremia. Patient was treated with IV Vancomycin and a PICC line was placed. Per patient's daughter, a right hip aspiration showed a prosthetic joint infection. Patient subsequently presented to [facility] and was admitted to the medicine service. Orthopedics was consulted at the time and discussed Girdlestone Resection Arthroplasty. Discussed operative and nonoperative management at length with the patient and her daughter. Patient and her daughter did not want operative management. She was started on antibiotic management by Infectious Disease.    Discussed with the patient that patient has a prosthetic joint infection. Discussed that due to her prior surgeries and remaining bone, her management is complex. Discussed due to her heterotopic ossification, history of infections, and remaining bone stock, operative management would consist of Girdlestone Resection Arthroplasty. Discussed that this would consist of removing the total hip arthroplasty without placing a spacer or any prosthesis. Discussed that patient would not have an articulating hip joint following the surgery. Discussed that it would be difficult to ambulate following Girdlestone Resection Arthroplasty, but may help with pain and infection control. Discussed that there is no guarantee of eradicating infection. Discussed the risks of operative management including, but not limited to, risks of anesthesia, heart attack, stroke, death, injuries to nerves and vessels, continued pain, recurrent or continued infection, difficulty with ambulation, fractures, and blood loss. Discussed that nonoperative management would consist of suppressive antibiotics. Discussed that this would allow the patient to keep her hip joint, but may not eradicate infection. Discussed the risks of nonoperative management including, but not limited to, continued pain, continued infection, worsening infection, and sepsis. Discussed that with or without surgery, patient would likely require lifelong antibiotics.     Patient again stated that she does not want operative management at this time. She would like antibiotic management.    Plan:  -No acute orthopedic intervention at this time  -Pain Control  -Follow up Infectious Disease  -Patient will likely require lifelong antibiotics

## 2023-05-19 NOTE — PROGRESS NOTE ADULT - PROBLEM SELECTOR PLAN 7
-Pt w/ hx of HTN, on home losartan, nifedipine, and hydralazine.  > starting losartan today d/t increased BP

## 2023-05-19 NOTE — PROGRESS NOTE ADULT - ATTENDING COMMENTS
77F w/alcohol abuse, Anxiety, CAD, emphysema, HLD, HTN, migraine, seizure disorder, MRSA bacteremia 2/2 recurrence of MRSA R hip chronic OM based on R hip aspiration (at [facility]) for encephalopathy 2/2 opioid overdose, resolved with Narcan, w/underlying severe sepsis 2/2 suspected OM, c/b hypotension and LAI, now resolved.   #Toxic metabolic encephalopathy 2/2 opioid overdose: resolved with Narcan in ER. now oriented 3.   #Sepsis 2/2 SSTI vs OM: c/w Dapto and Cefepime, xrays completed, CT completed, pending ortho consult   Last admission, pt was seen by ortho at prior hospitalization regarding Girdlestone Resection Arthoplasty- would be palliative procedure to improve patient's pain and help with infection control, however pt would not be able to ambulate and pt declined surgery.  Palliative consulted at [facility], f/u recs.   Team reached pts dgt to get culture data from [facility].   #LAI 2/2 sepsis: resolved  #Opioid abuse: c/w oxy inpt. No IV meds  VM left per pts dgt, Brittni ([phone]) on 5/18  Updated, pts brother, Louie Cummings ([phone]) on 5/18 and 5/19.

## 2023-05-19 NOTE — PROGRESS NOTE ADULT - PROBLEM SELECTOR PLAN 2
-Pt w/ right hip pain chronic i/s/o chronic OM vs acute OM .   -On lidocaine 4%, percocet based on I-STOP  -On morphine and hydromorphone based on prior records; unable to confirm med list w/ family  -Concern for overdose s/p narcan x2  > f/u CT of right leg & hip d/t concern for progressive cellulitis   > pain regimen oxy 5/10 (moderate/severe)  > pain c/s

## 2023-05-19 NOTE — CONSULT NOTE ADULT - PROBLEM SELECTOR RECOMMENDATION 6
Thank you for allowing us to participate in your patient's care. We will continue to follow with you. Please page [phone] for any q's or c's. The Geriatric and Palliative Medicine service has coverage 24 hours a day/ 7 days a week to provide medical recommendations regarding symptom management needs via telephone.

## 2023-05-19 NOTE — CHART NOTE - NSCHARTNOTEFT_GEN_A_CORE
Ms. Srivastava is a 77 year old female, past medical history of CAD, CHF, HTN, MRSA bacteremia, who presented to the [facility] Emergency Department with right hip pain. Patient has been experiencing right hip pain for about 2-3 weeks. 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 She then underwent a Right USAMA by Dr. Jaime in 2018, but was complicated by a fall and periprosthetic fracture in 2018. She underwent ORIF and revision USAMA by Dr. Be. 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 Be. Patient presented to [facility] about 3 weeks ago with right hip pain and stated that she was found to have MRSA bacteremia. Patient was treated with IV Vancomycin and a PICC line was placed. Per patient's daughter, a right hip aspiration showed a prosthetic joint infection. Patient subsequently presented to [facility] and was admitted to the medicine service. XRays showed a right total hip arthroplasty with significant heterotopic ossification. She underwent right hip aspiration, which only produced about 1 cc of serosanguinous fluid. Gram Stain was negative and cultures are no growth at this time.    Discussed with the patient's daughter that patient may have a prosthetic joint infection. Discussed following cultures and results from the aspiration. Discussed that due to her prior surgeries and remaining bone, her management is complex. Discussed due to her heterotopic ossification, history of infections, and remaining bone stock, operative management would consist of Girdlestone Resection Arthroplasty. Discussed that this would consist of removing the total hip arthroplasty without placing a spacer or any prosthesis. Discussed that patient would not have an articulating hip joint following the surgery. Discussed that it would be difficult to ambulate following Girdlestone Resection Arthroplasty, but may help with pain and infection control. Discussed that there is no guarantee of eradicating infection. Discussed the risks of operative management including, but not limited to, risks of anesthesia, heart attack, stroke, death, injuries to nerves and vessels, continued pain, recurrent or continued infection, difficulty with ambulation, fractures, and blood loss. Discussed that nonoperative management would consist of suppressive antibiotics. Discussed that this would allow the patient to keep her hip joint, but may not eradicate infection. Discussed the risks of nonoperative management including, but not limited to, continued pain, continued infection, worsening infection, and sepsis. Discussed that with or without surgery, patient would likely require lifelong antibiotics. Offered patient's daughter help with obtaining a second opinion. Patient's daughter discussed possible consultation with orthopedist at [facility]. Patient's daughter will think about the options. All questions were answered.    Exam: **********    Plan:  -No acute orthopedic intervention at this time  -Pain Control  -Follow up Infectious Disease recommendations  -Patient will likely require lifelong antibiotics Ms. Srivastava is a 77 year old female, past medical history of CAD, CHF, HTN, MRSA bacteremia, who presented to the [facility] Emergency Departments in April with right hip pain. Patient had been experiencing right hip pain for about 3 weeks. 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 She then underwent a Right USAMA by Dr. Jaime in 2018, but was complicated by a fall and periprosthetic fracture in 2018. She underwent ORIF and revision USAMA by Dr. Be. 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 Be. Patient presented to [facility] about 6 weeks ago with right hip pain and stated that she was found to have MRSA bacteremia. Patient was treated with IV Vancomycin and a PICC line was placed. Per patient's daughter, right hip aspiration showed a prosthetic joint infection.  Patient subsequently presented to [facility] and was admitted to the medicine service. XRays showed a right total hip arthroplasty with significant heterotopic ossification. She underwent a right hip aspiration, which only produced about 1 cc of serosanguinous fluid. Gram Stain was negative and cultures were no growth at that time.    During that admission, Dr. Bailey had an extensive conversation with the patient's daughter regarding the possibility of her having a prosthetic joint infection. She was told that due to her prior surgeries and remaining bone, her management would be complex. Discussed that due to her heterotopic ossification, history of infections, and remaining bone stock, operative management would consist of Girdlestone Resection Arthroplasty. A girdlestone would consist of removing the total hip arthroplasty without placing a spacer or any prosthesis in its place. The patient would not have an articulating hip joint following the surgery and ambulation would be difficult, but the procedure may help with pain and infection control with no guarantee of full eradication. The discussion was left at her pursuing a second opinion.    Patient has since presented at [facility] with concerns for sepsis. As per infectious disease, she has failed attempted long term treatment with IV antibiotics and is being evaluated for potential chronic suppression options based on her prior cultures. She is not interested in pursuing the girdlestone surgical option previously offered to her on my exam today.    Physical exam:   TTP over R hip joint, able to DF/PF with encouragement. SILT.  R ankle ulcer dressed with allevyn    Plan:  -No acute orthopedic intervention at this time  -Pain Control  -Follow up Infectious Disease recommendations for chronic suppressive antibiotics    Susi Pineda PA-C  Team Pager #[phone] Ms. Srivastava is a 77 year old female, past medical history of CAD, CHF, HTN, MRSA bacteremia, who presented to the [facility] Emergency Departments in April with right hip pain. Patient had been experiencing right hip pain for about 3 weeks. 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 She then underwent a Right USAMA by Dr. Jaime in 2018, but was complicated by a fall and periprosthetic fracture in 2018. She underwent ORIF and revision USAMA by Dr. Be. 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 Be. Patient presented to [facility] about 6 weeks ago with right hip pain and stated that she was found to have MRSA bacteremia. Patient was treated with IV Vancomycin and a PICC line was placed. Per patient's daughter, right hip aspiration showed a prosthetic joint infection. Patient subsequently presented to [facility] and was admitted to the medicine service. XRays showed a right total hip arthroplasty with significant heterotopic ossification. She underwent a right hip aspiration, which only produced about 1 cc of serosanguinous fluid. Gram Stain was negative and cultures were no growth at that time.    During that admission, Dr. Bailey had an extensive conversation with the patient's daughter regarding the possibility of her having a prosthetic joint infection. She was told that due to her prior surgeries and remaining bone, her management would be complex. Discussed that due to her heterotopic ossification, history of infections, and remaining bone stock, operative management would consist of Girdlestone Resection Arthroplasty. A girdlestone would consist of removing the total hip arthroplasty without placing a spacer or any prosthesis in its place. The patient would not have an articulating hip joint following the surgery and ambulation would be difficult, but the procedure may help with pain and infection control with no guarantee of full eradication. The discussion was left at her pursuing a second opinion.    Patient has since presented at [facility] with concerns for sepsis. As per infectious disease, she has failed attempted long term treatment with IV antibiotics and is being evaluated for potential chronic suppression options based on her prior cultures. She is not interested in pursuing the girdlestone surgical option previously offered to her on my exam today.    Physical exam:   TTP over R hip joint, able to DF/PF with encouragement. SILT.  R ankle ulcer dressed with allevyn    Plan:  -No acute orthopedic intervention at this time  -Pain Control  -Follow up Infectious Disease recommendations for chronic suppressive antibiotics    Susi Pineda PA-C  Team Pager #[phone] Ms. Srivastava is a 77 year old female, past medical history of CAD, CHF, HTN, MRSA bacteremia, who presented to the [facility] Emergency Departments in April with right hip pain. Patient had been experiencing right hip pain for about 3 weeks. 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 She then underwent a Right USAMA by Dr. Jaime in 2018, but was complicated by a fall and periprosthetic fracture in 2018. She underwent ORIF and revision USAMA by Dr. Be. 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 Be.    Patient presented to [facility] about 6 weeks ago with right hip pain and stated that she was found to have MRSA bacteremia. Patient was treated with IV Vancomycin and a PICC line was placed. Per patient's daughter, right hip aspiration showed a prosthetic joint infection. Patient subsequently presented to [facility] and was admitted to the medicine service. XRays showed a right total hip arthroplasty with significant heterotopic ossification. She underwent a right hip aspiration, which only produced about 1 cc of serosanguinous fluid. Gram Stain was negative and cultures were no growth at that time. During that admission, Dr. Bailey had an extensive conversation with the patient's daughter regarding the possibility of her having a prosthetic joint infection. She was told that due to her prior surgeries and remaining bone, her management would be complex. Discussed that due to her heterotopic ossification, history of infections, and remaining bone stock, operative management would consist of Girdlestone Resection Arthroplasty. A girdlestone would consist of removing the total hip arthroplasty without placing a spacer or any prosthesis in its place. The patient would not have an articulating hip joint following the surgery and ambulation would be difficult, but the procedure may help with pain and infection control with no guarantee of full eradication. The discussion was left at her pursuing a second opinion.    Patient has since presented at [facility] with concerns for sepsis. As per infectious disease, she has failed attempted long term treatment with IV antibiotics and is being evaluated for potential chronic suppression options based on her prior cultures. She is not interested in pursuing the girdlestone surgical option previously offered to her on my exam today.    Physical exam:   TTP over R hip joint, able to DF/PF with encouragement. SILT.  R ankle ulcer dressed with allevyn    Plan:  -No acute orthopedic intervention at this time  -Pain Control  -Follow up Infectious Disease recommendations for chronic suppressive antibiotics    Susi Pineda PA-C  Team Pager #[phone]

## 2023-05-19 NOTE — DISCHARGE NOTE PROVIDER - NSDCCPCAREPLAN_GEN_ALL_CORE_FT
PRINCIPAL DISCHARGE DIAGNOSIS  Diagnosis: Severe sepsis  Assessment and Plan of Treatment: It was noted when you came into the ED that you had a fever. We started antibiotics and ordered scan of your leg due to suspicion that your infection was from the skin. We obtained a CT scan which showed _________________ . Our orthopedics team recommneded ____________ .      SECONDARY DISCHARGE DIAGNOSES  Diagnosis: Adult failure to thrive  Assessment and Plan of Treatment:     Diagnosis: DVT, lower extremity  Assessment and Plan of Treatment: It was noted that you had a clot in your leg. We started a blood thinner during your admission.    Diagnosis: Cellulitis of right leg  Assessment and Plan of Treatment:     Diagnosis: LAI (acute kidney injury)  Assessment and Plan of Treatment: It was noted that you had a kidney injury. It resolved with IV fluids.    Diagnosis: Opioid overdose  Assessment and Plan of Treatment:     Diagnosis: Cellulitis of right leg  Assessment and Plan of Treatment:      PRINCIPAL DISCHARGE DIAGNOSIS  Diagnosis: Severe sepsis  Assessment and Plan of Treatment: It was noted when you came into the ED that you had a fever. We started antibiotics and ordered scan of your leg due to suspicion that your infection was from the skin. We obtained a CT scan which showed Cellulitis. Our orthopedics team & infectious disease teams are in agreement that the source of your infection in an infected prostetic joint. You will require either a surgery per orthopedics or antibiotics lifetime.      SECONDARY DISCHARGE DIAGNOSES  Diagnosis: DVT, lower extremity  Assessment and Plan of Treatment: It was noted that you had a clot in your leg. We started a blood thinner during your admission.    Diagnosis: LAI (acute kidney injury)  Assessment and Plan of Treatment: It was noted that you had a kidney injury. It resolved with IV fluids.     PRINCIPAL DISCHARGE DIAGNOSIS  Diagnosis: Severe sepsis  Assessment and Plan of Treatment: It was noted when you came into the ED that you had a fever. We started antibiotics and ordered scan of your leg due to suspicion that your infection was from the skin. We obtained a CT scan which showed Cellulitis. Our orthopedics team & infectious disease teams are in agreement that the source of your infection in an infected prostetic joint. You will require either a surgery per orthopedics or antibiotics lifetime.      SECONDARY DISCHARGE DIAGNOSES  Diagnosis: DVT, lower extremity  Assessment and Plan of Treatment: It was noted that you had a clot in your leg. We started a blood thinner during your admission.    Diagnosis: Anxiety  Assessment and Plan of Treatment: You have history of anxiety, takes clonazepam 0.5 q8 and zolpidem 5mg qhs, remeron. You were continued on your home meds.   Please see Pysc at rehab or outpt to consider adjusting Klonopin       Diagnosis: LAI (acute kidney injury)  Assessment and Plan of Treatment: It was noted that you had a kidney injury. It resolved with IV fluids.

## 2023-05-19 NOTE — PROGRESS NOTE ADULT - PROBLEM SELECTOR PLAN 5
-Pt presenting w/ AMS  -Diff: C/f septic encephalopathy vs drug induced encephalopathy vs ETOH withdrawal  -Arousable, but somnolent on presentation  -S/p narcan x2 with some improvement  -Pt on Lomotil based on I-STOP  -Atropine overdose to consider given hyperthermia, tachycardia, dry MM, and urinary retention; however, difficult to differentiate from sepsis  -Pt also on benzo and opioids as seen on utox  -per ON tox fellow overnight, primary presentation likely not 2/2 drug-induced encephalopathy, but likely contributing. Advised against treating for atropine overdose. Also advised to hold further narcan as pt RR stable and pt not retaining  on blood gas  -A&O x 3 in AM   -CTH w/o abnormalities  > currently A&O x 3  > infectious work-up as above  > fall precautions, seizure precaution, aspiration precaution  > will h/o sedative medications

## 2023-05-20 LAB
ALBUMIN SERPL ELPH-MCNC: 3.3 G/DL — SIGNIFICANT CHANGE UP (ref 3.3–5)
ALP SERPL-CCNC: 71 U/L — SIGNIFICANT CHANGE UP (ref 40–120)
ALT FLD-CCNC: 14 U/L — SIGNIFICANT CHANGE UP (ref 4–33)
ANION GAP SERPL CALC-SCNC: 11 MMOL/L — SIGNIFICANT CHANGE UP (ref 7–14)
APTT BLD: 47.5 SEC — HIGH (ref 27–36.3)
APTT BLD: 47.9 SEC — HIGH (ref 27–36.3)
APTT BLD: 49 SEC — HIGH (ref 27–36.3)
APTT BLD: 63.5 SEC — HIGH (ref 27–36.3)
AST SERPL-CCNC: 26 U/L — SIGNIFICANT CHANGE UP (ref 4–32)
BASOPHILS # BLD AUTO: 0.01 K/UL — SIGNIFICANT CHANGE UP (ref 0–0.2)
BASOPHILS NFR BLD AUTO: 0.1 % — SIGNIFICANT CHANGE UP (ref 0–2)
BILIRUB SERPL-MCNC: 0.5 MG/DL — SIGNIFICANT CHANGE UP (ref 0.2–1.2)
BUN SERPL-MCNC: 20 MG/DL — SIGNIFICANT CHANGE UP (ref 7–23)
CALCIUM SERPL-MCNC: 9 MG/DL — SIGNIFICANT CHANGE UP (ref 8.4–10.5)
CHLORIDE SERPL-SCNC: 95 MMOL/L — LOW (ref 98–107)
CO2 SERPL-SCNC: 29 MMOL/L — SIGNIFICANT CHANGE UP (ref 22–31)
CREAT SERPL-MCNC: 0.6 MG/DL — SIGNIFICANT CHANGE UP (ref 0.5–1.3)
EGFR: 94 ML/MIN/1.73M2 — SIGNIFICANT CHANGE UP
EOSINOPHIL # BLD AUTO: 0.09 K/UL — SIGNIFICANT CHANGE UP (ref 0–0.5)
EOSINOPHIL NFR BLD AUTO: 1.3 % — SIGNIFICANT CHANGE UP (ref 0–6)
GLUCOSE SERPL-MCNC: 114 MG/DL — HIGH (ref 70–99)
HCT VFR BLD CALC: 31.7 % — LOW (ref 34.5–45)
HGB BLD-MCNC: 9.9 G/DL — LOW (ref 11.5–15.5)
IANC: 5.4 K/UL — SIGNIFICANT CHANGE UP (ref 1.8–7.4)
IMM GRANULOCYTES NFR BLD AUTO: 0.1 % — SIGNIFICANT CHANGE UP (ref 0–0.9)
INR BLD: 1.27 RATIO — HIGH (ref 0.88–1.16)
LYMPHOCYTES # BLD AUTO: 0.58 K/UL — LOW (ref 1–3.3)
LYMPHOCYTES # BLD AUTO: 8.7 % — LOW (ref 13–44)
MAGNESIUM SERPL-MCNC: 1.9 MG/DL — SIGNIFICANT CHANGE UP (ref 1.6–2.6)
MCHC RBC-ENTMCNC: 29.6 PG — SIGNIFICANT CHANGE UP (ref 27–34)
MCHC RBC-ENTMCNC: 31.2 GM/DL — LOW (ref 32–36)
MCV RBC AUTO: 94.9 FL — SIGNIFICANT CHANGE UP (ref 80–100)
MONOCYTES # BLD AUTO: 0.61 K/UL — SIGNIFICANT CHANGE UP (ref 0–0.9)
MONOCYTES NFR BLD AUTO: 9.1 % — SIGNIFICANT CHANGE UP (ref 2–14)
NEUTROPHILS # BLD AUTO: 5.4 K/UL — SIGNIFICANT CHANGE UP (ref 1.8–7.4)
NEUTROPHILS NFR BLD AUTO: 80.7 % — HIGH (ref 43–77)
NRBC # BLD: 0 /100 WBCS — SIGNIFICANT CHANGE UP (ref 0–0)
NRBC # FLD: 0 K/UL — SIGNIFICANT CHANGE UP (ref 0–0)
PHOSPHATE SERPL-MCNC: 3 MG/DL — SIGNIFICANT CHANGE UP (ref 2.5–4.5)
PLATELET # BLD AUTO: 233 K/UL — SIGNIFICANT CHANGE UP (ref 150–400)
POTASSIUM SERPL-MCNC: 3.8 MMOL/L — SIGNIFICANT CHANGE UP (ref 3.5–5.3)
POTASSIUM SERPL-SCNC: 3.8 MMOL/L — SIGNIFICANT CHANGE UP (ref 3.5–5.3)
PROT SERPL-MCNC: 8.1 G/DL — SIGNIFICANT CHANGE UP (ref 6–8.3)
PROTHROM AB SERPL-ACNC: 14.8 SEC — HIGH (ref 10.5–13.4)
RBC # BLD: 3.34 M/UL — LOW (ref 3.8–5.2)
RBC # FLD: 12.9 % — SIGNIFICANT CHANGE UP (ref 10.3–14.5)
SODIUM SERPL-SCNC: 135 MMOL/L — SIGNIFICANT CHANGE UP (ref 135–145)
WBC # BLD: 6.7 K/UL — SIGNIFICANT CHANGE UP (ref 3.8–10.5)
WBC # FLD AUTO: 6.7 K/UL — SIGNIFICANT CHANGE UP (ref 3.8–10.5)

## 2023-05-20 PROCEDURE — 99233 SBSQ HOSP IP/OBS HIGH 50: CPT | Mod: GC

## 2023-05-20 RX ORDER — ACETAMINOPHEN 500 MG
1000 TABLET ORAL ONCE
Refills: 0 | Status: COMPLETED | OUTPATIENT
Start: 2023-05-20 | End: 2023-05-20

## 2023-05-20 RX ORDER — OXYCODONE HYDROCHLORIDE 5 MG/1
5 TABLET ORAL ONCE
Refills: 0 | Status: DISCONTINUED | OUTPATIENT
Start: 2023-05-20 | End: 2023-05-20

## 2023-05-20 RX ORDER — OXYCODONE HYDROCHLORIDE 5 MG/1
5 TABLET ORAL EVERY 4 HOURS
Refills: 0 | Status: DISCONTINUED | OUTPATIENT
Start: 2023-05-20 | End: 2023-05-21

## 2023-05-20 RX ORDER — OXYCODONE HYDROCHLORIDE 5 MG/1
10 TABLET ORAL EVERY 12 HOURS
Refills: 0 | Status: DISCONTINUED | OUTPATIENT
Start: 2023-05-20 | End: 2023-05-21

## 2023-05-20 RX ADMIN — Medication 1 APPLICATION(S): at 06:56

## 2023-05-20 RX ADMIN — OXYCODONE HYDROCHLORIDE 10 MILLIGRAM(S): 5 TABLET ORAL at 02:12

## 2023-05-20 RX ADMIN — OXYCODONE HYDROCHLORIDE 5 MILLIGRAM(S): 5 TABLET ORAL at 17:36

## 2023-05-20 RX ADMIN — OXYCODONE HYDROCHLORIDE 5 MILLIGRAM(S): 5 TABLET ORAL at 17:05

## 2023-05-20 RX ADMIN — Medication 0.5 MILLIGRAM(S): at 20:03

## 2023-05-20 RX ADMIN — OXYCODONE HYDROCHLORIDE 10 MILLIGRAM(S): 5 TABLET ORAL at 20:03

## 2023-05-20 RX ADMIN — OXYCODONE HYDROCHLORIDE 10 MILLIGRAM(S): 5 TABLET ORAL at 02:42

## 2023-05-20 RX ADMIN — Medication 6 MILLIGRAM(S): at 21:26

## 2023-05-20 RX ADMIN — HEPARIN SODIUM 2000 UNIT(S): 5000 INJECTION INTRAVENOUS; SUBCUTANEOUS at 08:23

## 2023-05-20 RX ADMIN — HEPARIN SODIUM 2000 UNIT(S): 5000 INJECTION INTRAVENOUS; SUBCUTANEOUS at 16:01

## 2023-05-20 RX ADMIN — Medication 0.5 MILLIGRAM(S): at 09:54

## 2023-05-20 RX ADMIN — HEPARIN SODIUM 1600 UNIT(S)/HR: 5000 INJECTION INTRAVENOUS; SUBCUTANEOUS at 15:59

## 2023-05-20 RX ADMIN — OXYCODONE HYDROCHLORIDE 10 MILLIGRAM(S): 5 TABLET ORAL at 20:50

## 2023-05-20 RX ADMIN — HEPARIN SODIUM 1600 UNIT(S)/HR: 5000 INJECTION INTRAVENOUS; SUBCUTANEOUS at 23:26

## 2023-05-20 RX ADMIN — OXYCODONE HYDROCHLORIDE 10 MILLIGRAM(S): 5 TABLET ORAL at 08:22

## 2023-05-20 RX ADMIN — DAPTOMYCIN 112 MILLIGRAM(S): 500 INJECTION, POWDER, LYOPHILIZED, FOR SOLUTION INTRAVENOUS at 22:54

## 2023-05-20 RX ADMIN — Medication 400 MILLIGRAM(S): at 06:50

## 2023-05-20 RX ADMIN — HEPARIN SODIUM 1400 UNIT(S)/HR: 5000 INJECTION INTRAVENOUS; SUBCUTANEOUS at 07:37

## 2023-05-20 RX ADMIN — Medication 1 PATCH: at 07:14

## 2023-05-20 RX ADMIN — OXYCODONE HYDROCHLORIDE 5 MILLIGRAM(S): 5 TABLET ORAL at 14:06

## 2023-05-20 RX ADMIN — HEPARIN SODIUM 1500 UNIT(S)/HR: 5000 INJECTION INTRAVENOUS; SUBCUTANEOUS at 08:22

## 2023-05-20 RX ADMIN — HEPARIN SODIUM 1600 UNIT(S)/HR: 5000 INJECTION INTRAVENOUS; SUBCUTANEOUS at 19:27

## 2023-05-20 RX ADMIN — Medication 1 APPLICATION(S): at 16:03

## 2023-05-20 RX ADMIN — Medication 1000 MILLIGRAM(S): at 07:20

## 2023-05-20 RX ADMIN — OXYCODONE HYDROCHLORIDE 5 MILLIGRAM(S): 5 TABLET ORAL at 15:06

## 2023-05-20 RX ADMIN — HEPARIN SODIUM 1400 UNIT(S)/HR: 5000 INJECTION INTRAVENOUS; SUBCUTANEOUS at 08:20

## 2023-05-20 RX ADMIN — Medication 60 MILLIGRAM(S): at 06:55

## 2023-05-20 NOTE — PROGRESS NOTE ADULT - SUBJECTIVE AND OBJECTIVE BOX
Jarod Daiz  PGY-1 Resident Physician     Patient is a 77y old  Female who presents with a chief complaint of AMS (19 May 2023 16:40)    SUBJECTIVE / OVERNIGHT EVENTS:  NAEON. A&O x 3.     MEDICATIONS  (STANDING):  albuterol/ipratropium for Nebulization 3 milliLiter(s) Nebulizer every 6 hours  bacitracin   Ointment 1 Application(s) Topical two times a day  clonazePAM  Tablet 0.5 milliGRAM(s) Oral every 12 hours  DAPTOmycin IVPB 300 milliGRAM(s) IV Intermittent every 24 hours  heparin  Infusion.  Unit(s)/Hr (11 mL/Hr) IV Continuous <Continuous>  melatonin 6 milliGRAM(s) Oral at bedtime  nicotine -  14 mG/24Hr(s) Patch 1 Patch Transdermal daily  NIFEdipine XL 60 milliGRAM(s) Oral daily    MEDICATIONS  (PRN):  heparin   Injectable 4500 Unit(s) IV Push every 6 hours PRN For aPTT less than 40  heparin   Injectable 2000 Unit(s) IV Push every 6 hours PRN For aPTT between 40 - 57  oxyCODONE    IR 10 milliGRAM(s) Oral every 6 hours PRN Severe Pain (7 - 10)  oxyCODONE    IR 5 milliGRAM(s) Oral every 6 hours PRN Moderate Pain (4 - 6)  zolpidem 5 milliGRAM(s) Oral at bedtime PRN Insomnia    Allergies    No Known Allergies    Intolerances        Vital Signs Last 24 Hrs  T(C): 36.9 (20 May 2023 01:00), Max: 37.1 (19 May 2023 13:29)  T(F): 98.4 (20 May 2023 01:00), Max: 98.8 (19 May 2023 13:29)  HR: 84 (20 May 2023 01:00) (84 - 91)  BP: 156/71 (20 May 2023 01:00) (155/72 - 189/90)  BP(mean): --  RR: 17 (20 May 2023 01:00) (17 - 18)  SpO2: 95% (20 May 2023 01:00) (93% - 96%)    Parameters below as of 20 May 2023 01:00  Patient On (Oxygen Delivery Method): nasal cannula  O2 Flow (L/min): 2    Daily     Daily   I&O's Summary    19 May 2023 07:01  -  20 May 2023 07:00  --------------------------------------------------------  IN: 530 mL / OUT: 900 mL / NET: -370 mL        Physical Exam  Constitutional: NAD; somnolent, arousable  Head: NC/AT  Eyes: pupils sluggish reactive to light, anicteric sclera  ENT: no nasal discharge; dry MM  Neck: supple; no JVD  Respiratory: CTA B/L; no W/R/R  Cardiac: systolic ejection murmur, RRR  Gastrointestinal: soft, NT/ND; no rebound or guarding  Extremities: WWP, no clubbing or cyanosis; no peripheral edema.  Musculoskeletal: RLE erythema, no warmth  Vascular: 2+ radial, DP/PT pulses B/L  Dermatologic: skin warm, dry  Neuro: A&Ox4     DIAGNOSTICS:                         9.8    11.61 )-----------( 200      ( 19 May 2023 05:30 )             32.8     Hgb Trend: 9.8<--, 9.4<--, 8.7<--, 8.2<--, 10.0<--  05-19    133<L>  |  97<L>  |  28<H>  ----------------------------<  169<H>  4.7   |  20<L>  |  0.77    Ca    8.7      19 May 2023 05:30  Phos  4.1     05-19  Mg     1.80     05-19    TPro  8.4<H>  /  Alb  3.3  /  TBili  0.4  /  DBili  x   /  AST  39<H>  /  ALT  13  /  AlkPhos  76  05-19    CAPILLARY BLOOD GLUCOSE        Creatinine Trend: 0.77<--, 1.02<--, 1.06<--, 2.06<--, 0.97<--, 0.89<--  LIVER FUNCTIONS - ( 19 May 2023 05:30 )  Alb: 3.3 g/dL / Pro: 8.4 g/dL / ALK PHOS: 76 U/L / ALT: 13 U/L / AST: 39 U/L / GGT: x           PTT - ( 19 May 2023 18:59 )  PTT:55.2 sec  CARDIAC MARKERS ( 19 May 2023 05:30 )  x     / x     / 240 U/L / x     / x

## 2023-05-20 NOTE — PROGRESS NOTE ADULT - PROBLEM SELECTOR PLAN 1
-On admission, pt w/ fever to 102 and tachycardia  -Exam c/f RLE cellulitis. Also hx of R hip OM 2/2 MRSA  -CXR clear and UA unremarkable.   -s/p 2L in ED; give additional 1L  -s/p broad spectrum abx w/ cefepime and vanco (5/16-5/17)  -BCx (NGTD)  > f/u CT of right leg & hip d/t concern for progressive cellulitis   > f/u Ortho recs  > ID c/s     > c/w Dapto (5/17 - )  > noted right DVT on US -On admission, pt w/ fever to 102 and tachycardia  -Exam c/f RLE cellulitis. Also hx of R hip OM 2/2 MRSA  -CXR clear and UA unremarkable.   -s/p 2L in ED; give additional 1L  -s/p broad spectrum abx w/ cefepime and vanco (5/16-5/17)  -BCx (NGTD)  > f/u CT of right leg & hip d/t concern for progressive cellulitis   > f/u Ortho recs: no surgical intervention  > ID c/s: no abx, recommend palliative     > c/w Dapto (5/17 - )  > noted right DVT on US

## 2023-05-20 NOTE — PROGRESS NOTE ADULT - PROBLEM SELECTOR PLAN 3
-(+) right venous thrombus DVT (5/18)  > started on heparin drip -(+) right venous thrombus DVT (5/18)  > started on heparin drip  > consider transition to Xarelto

## 2023-05-20 NOTE — PROGRESS NOTE ADULT - ATTENDING COMMENTS
Seen and examined by me this afternoon, states she can't take blood thinners because she has a reaction to them (IV heparin has been running for past 2 days with no issues). States that her RLE is not any better (on exam looks ok, has some cream on it)  Toxic encephalopathy 2/2 opioid overdose: resolved with Narcan in ER  Sepsis-POA 2/2 SSTI vs OM, c/w Dapto and Cefepime, f/up further ID recs, f/up CK (slightly up but sample was hemolyzed)  Apprec Palliative and Ortho recs  C/w IV heparin for RLE DVT (transition to PO if no surgical intervention is planned)  Borderline elevated BP/HTN, nifedipine resumed yesterday, monitor response  LAI 2/2 sepsis resolved  Opioid use disorder, c/w oxy inpt, No IV meds  PT eval --> MADELEINE  Dietitian Consultation ordered  Rest as above

## 2023-05-20 NOTE — PROGRESS NOTE ADULT - PROBLEM SELECTOR PLAN 11
DVT: heparin subq  Diet: regular diet  Dispo: pending clinical improvement  Code Status: pending
DVT: heparin subq  Diet: regular diet  Dispo: pending clinical improvement  Code Status: pending

## 2023-05-20 NOTE — PROGRESS NOTE ADULT - PROBLEM SELECTOR PLAN 7
-Pt w/ hx of HTN, on home losartan, nifedipine, and hydralazine.  > starting losartan today d/t increased BP -Pt w/ hx of HTN, on home losartan, nifedipine, and hydralazine.  > on home nifedipine 60 qday

## 2023-05-20 NOTE — PROGRESS NOTE ADULT - PROBLEM SELECTOR PLAN 2
-Pt w/ right hip pain chronic i/s/o chronic OM vs acute OM .   -On lidocaine 4%, percocet based on I-STOP  -On morphine and hydromorphone based on prior records; unable to confirm med list w/ family  -Concern for overdose s/p narcan x2  -CT w/o abnormalities    > pain regimen oxy 5/10 (moderate/severe)  > pain c/s -Pt w/ right hip pain chronic i/s/o chronic OM vs acute OM .   -On lidocaine 4%, percocet based on I-STOP  -On morphine and hydromorphone based on prior records; unable to confirm med list w/ family  -Concern for overdose s/p narcan x2  -CT w/o abnormalities    > pain regimen oxy 5/10 (moderate/severe)

## 2023-05-21 LAB
ALBUMIN SERPL ELPH-MCNC: 3.2 G/DL — LOW (ref 3.3–5)
ALP SERPL-CCNC: 66 U/L — SIGNIFICANT CHANGE UP (ref 40–120)
ALT FLD-CCNC: 10 U/L — SIGNIFICANT CHANGE UP (ref 4–33)
ANION GAP SERPL CALC-SCNC: 12 MMOL/L — SIGNIFICANT CHANGE UP (ref 7–14)
APTT BLD: 78.9 SEC — HIGH (ref 27–36.3)
AST SERPL-CCNC: 22 U/L — SIGNIFICANT CHANGE UP (ref 4–32)
BASOPHILS # BLD AUTO: 0.02 K/UL — SIGNIFICANT CHANGE UP (ref 0–0.2)
BASOPHILS NFR BLD AUTO: 0.4 % — SIGNIFICANT CHANGE UP (ref 0–2)
BILIRUB SERPL-MCNC: 0.4 MG/DL — SIGNIFICANT CHANGE UP (ref 0.2–1.2)
BUN SERPL-MCNC: 24 MG/DL — HIGH (ref 7–23)
CALCIUM SERPL-MCNC: 8.7 MG/DL — SIGNIFICANT CHANGE UP (ref 8.4–10.5)
CHLORIDE SERPL-SCNC: 99 MMOL/L — SIGNIFICANT CHANGE UP (ref 98–107)
CO2 SERPL-SCNC: 28 MMOL/L — SIGNIFICANT CHANGE UP (ref 22–31)
CREAT SERPL-MCNC: 0.67 MG/DL — SIGNIFICANT CHANGE UP (ref 0.5–1.3)
EGFR: 91 ML/MIN/1.73M2 — SIGNIFICANT CHANGE UP
EOSINOPHIL # BLD AUTO: 0.25 K/UL — SIGNIFICANT CHANGE UP (ref 0–0.5)
EOSINOPHIL NFR BLD AUTO: 5.6 % — SIGNIFICANT CHANGE UP (ref 0–6)
GLUCOSE SERPL-MCNC: 85 MG/DL — SIGNIFICANT CHANGE UP (ref 70–99)
HCT VFR BLD CALC: 30.3 % — LOW (ref 34.5–45)
HGB BLD-MCNC: 9.6 G/DL — LOW (ref 11.5–15.5)
IANC: 2.97 K/UL — SIGNIFICANT CHANGE UP (ref 1.8–7.4)
IMM GRANULOCYTES NFR BLD AUTO: 0.2 % — SIGNIFICANT CHANGE UP (ref 0–0.9)
LYMPHOCYTES # BLD AUTO: 0.69 K/UL — LOW (ref 1–3.3)
LYMPHOCYTES # BLD AUTO: 15.4 % — SIGNIFICANT CHANGE UP (ref 13–44)
MAGNESIUM SERPL-MCNC: 2.1 MG/DL — SIGNIFICANT CHANGE UP (ref 1.6–2.6)
MCHC RBC-ENTMCNC: 29.8 PG — SIGNIFICANT CHANGE UP (ref 27–34)
MCHC RBC-ENTMCNC: 31.7 GM/DL — LOW (ref 32–36)
MCV RBC AUTO: 94.1 FL — SIGNIFICANT CHANGE UP (ref 80–100)
MONOCYTES # BLD AUTO: 0.53 K/UL — SIGNIFICANT CHANGE UP (ref 0–0.9)
MONOCYTES NFR BLD AUTO: 11.9 % — SIGNIFICANT CHANGE UP (ref 2–14)
NEUTROPHILS # BLD AUTO: 2.97 K/UL — SIGNIFICANT CHANGE UP (ref 1.8–7.4)
NEUTROPHILS NFR BLD AUTO: 66.5 % — SIGNIFICANT CHANGE UP (ref 43–77)
NRBC # BLD: 0 /100 WBCS — SIGNIFICANT CHANGE UP (ref 0–0)
NRBC # FLD: 0 K/UL — SIGNIFICANT CHANGE UP (ref 0–0)
PHOSPHATE SERPL-MCNC: 3.2 MG/DL — SIGNIFICANT CHANGE UP (ref 2.5–4.5)
PLATELET # BLD AUTO: 242 K/UL — SIGNIFICANT CHANGE UP (ref 150–400)
POTASSIUM SERPL-MCNC: 3.5 MMOL/L — SIGNIFICANT CHANGE UP (ref 3.5–5.3)
POTASSIUM SERPL-SCNC: 3.5 MMOL/L — SIGNIFICANT CHANGE UP (ref 3.5–5.3)
PROT SERPL-MCNC: 7.7 G/DL — SIGNIFICANT CHANGE UP (ref 6–8.3)
RBC # BLD: 3.22 M/UL — LOW (ref 3.8–5.2)
RBC # FLD: 12.6 % — SIGNIFICANT CHANGE UP (ref 10.3–14.5)
SODIUM SERPL-SCNC: 139 MMOL/L — SIGNIFICANT CHANGE UP (ref 135–145)
WBC # BLD: 4.47 K/UL — SIGNIFICANT CHANGE UP (ref 3.8–10.5)
WBC # FLD AUTO: 4.47 K/UL — SIGNIFICANT CHANGE UP (ref 3.8–10.5)

## 2023-05-21 PROCEDURE — 99233 SBSQ HOSP IP/OBS HIGH 50: CPT

## 2023-05-21 RX ORDER — ZOLPIDEM TARTRATE 10 MG/1
5 TABLET ORAL AT BEDTIME
Refills: 0 | Status: DISCONTINUED | OUTPATIENT
Start: 2023-05-21 | End: 2023-05-25

## 2023-05-21 RX ADMIN — Medication 1 APPLICATION(S): at 05:58

## 2023-05-21 RX ADMIN — HEPARIN SODIUM 1600 UNIT(S)/HR: 5000 INJECTION INTRAVENOUS; SUBCUTANEOUS at 19:31

## 2023-05-21 RX ADMIN — Medication 1 APPLICATION(S): at 17:14

## 2023-05-21 RX ADMIN — Medication 0.5 MILLIGRAM(S): at 07:36

## 2023-05-21 RX ADMIN — ZOLPIDEM TARTRATE 5 MILLIGRAM(S): 10 TABLET ORAL at 22:33

## 2023-05-21 RX ADMIN — HEPARIN SODIUM 1600 UNIT(S)/HR: 5000 INJECTION INTRAVENOUS; SUBCUTANEOUS at 07:36

## 2023-05-21 RX ADMIN — Medication 60 MILLIGRAM(S): at 05:56

## 2023-05-21 RX ADMIN — HEPARIN SODIUM 1600 UNIT(S)/HR: 5000 INJECTION INTRAVENOUS; SUBCUTANEOUS at 07:31

## 2023-05-21 RX ADMIN — OXYCODONE HYDROCHLORIDE 10 MILLIGRAM(S): 5 TABLET ORAL at 06:30

## 2023-05-21 RX ADMIN — OXYCODONE HYDROCHLORIDE 10 MILLIGRAM(S): 5 TABLET ORAL at 05:58

## 2023-05-21 RX ADMIN — ZOLPIDEM TARTRATE 5 MILLIGRAM(S): 10 TABLET ORAL at 00:21

## 2023-05-21 RX ADMIN — Medication 0.5 MILLIGRAM(S): at 17:29

## 2023-05-21 NOTE — DIETITIAN INITIAL EVALUATION ADULT - OTHER INFO
Per chart review, 76 Y/O F PMH Alcohol abuse, Anxiety, CAD (coronary artery disease), Emphysema, HLD, HTN, Migraine, hx of MRSA Bacteremia, Seizure disorder was brought in for change in mental status. Concern for sepsis, complicated by septic encephalopathy vs drug induced encephalopathy. Admitted to medicine for further evaluation.     Patient seen at bedside. Reports her appetite remains fair to good in hospital. As per RN flow sheet, pt's PO intake varies from % most of the time. Food preferences explored and honored to encourage PO intake. Pt is amenable to try Magic Cup 1x daily (290kcal, 9gm pro). Pt c/o persistent hip pain, Oxycodone ordered. Palliative care encountered for pain management. Denies any GI distress (nausea/vomiting/diarrhea/constipation.) Last BM yesterday as per pt. Pt's skin noted with right lateral malleolus wound, and 2+ edema to right LE. RD to remain available for further nutritional interventions as indicated.

## 2023-05-21 NOTE — PROVIDER CONTACT NOTE (OTHER) - ACTION/TREATMENT ORDERED:
patient complaining of severe pain and anxiety   Provider okay'd giving klonipin and percocet early. see provider to RN

## 2023-05-21 NOTE — PROGRESS NOTE ADULT - PROBLEM SELECTOR PLAN 4
-RESOLVED  -Arousable, but somnolent on presentation  -Diff: C/f septic encephalopathy vs drug induced encephalopathy vs ETOH withdrawal  -S/p narcan x2 with some improvement  -Pt also on benzo and opioids as seen on utox  -Pt on Lomotil based on I-STOP  -Atropine overdose to consider given hyperthermia, tachycardia, dry MM, and urinary retention; however, difficult to differentiate from sepsis  -per ON tox fellow overnight, primary presentation likely not 2/2 drug-induced encephalopathy, but likely contributing. Advised against treating for atropine overdose. Also advised to hold further narcan as pt RR stable and pt not retaining  on blood gas  -A&O x 3 in AM   -CTH w/o abnormalities  > currently A&O x 3  > infectious work-up as above  > fall precautions, seizure precaution, aspiration precaution  > will h/o sedative medications

## 2023-05-21 NOTE — PROGRESS NOTE ADULT - ATTENDING COMMENTS
Seen and examined by me this afternoon, c/o pain on R hip  Toxic encephalopathy 2/2 opioid overdose, resolved with Narcan in ER  Sepsis-POA 2/2 SSTI vs OM, c/w Dapto and Cefepime, f/up further ID recs, f/up CK (slightly up but sample was hemolyzed)  Apprec Palliative and Ortho recs  C/w IV heparin for RLE DVT (transition to PO if no surgical intervention is planned)  Ongoing family discussion with Ortho regarding possible surgery, f/up final decision from patient/daughter  BP/HTN improved after nifedipine was resumed, monitor BP closely  LAI 2/2 sepsis resolved  Opioid use disorder, c/w oxy inpt (dose adjusted to home regimen), NO IV pain meds  PT eval --> MADELEINE  Dietitian recs apprec-mild protein calorie malnutrition  Rest as above

## 2023-05-21 NOTE — DIETITIAN INITIAL EVALUATION ADULT - SIGNS/SYMPTOMS
<=75%of EER >=1 mo, mild fat loss and muscle wasting, mild edema  Banner Transposition Flap Text: The defect edges were debeveled with a #15 scalpel blade.  Given the location of the defect and the proximity to free margins a Banner transposition flap was deemed most appropriate.  Using a sterile surgical marker, an appropriate flap drawn around the defect. The area thus outlined was incised deep to adipose tissue with a #15 scalpel blade.  The skin margins were undermined to an appropriate distance in all directions utilizing iris scissors.

## 2023-05-21 NOTE — PROGRESS NOTE ADULT - PROBLEM SELECTOR PLAN 3
-(+) right venous thrombus DVT (5/18)  > c/w heparin drip d/t potential ortho procedure  > consider transition to Xarelto

## 2023-05-21 NOTE — DIETITIAN INITIAL EVALUATION ADULT - REASON FOR ADMISSION
Clinic notes, and Sleep study were faxed to [facility] using the [phone] fax number.   Sepsis

## 2023-05-21 NOTE — DIETITIAN INITIAL EVALUATION ADULT - PERTINENT MEDS FT
MEDICATIONS  (STANDING):  albuterol/ipratropium for Nebulization 3 milliLiter(s) Nebulizer every 6 hours  bacitracin   Ointment 1 Application(s) Topical two times a day  clonazePAM  Tablet 0.5 milliGRAM(s) Oral every 12 hours  DAPTOmycin IVPB 300 milliGRAM(s) IV Intermittent every 24 hours  heparin  Infusion.  Unit(s)/Hr (11 mL/Hr) IV Continuous <Continuous>  melatonin 6 milliGRAM(s) Oral at bedtime  nicotine -  14 mG/24Hr(s) Patch 1 Patch Transdermal daily  NIFEdipine XL 60 milliGRAM(s) Oral daily    MEDICATIONS  (PRN):  heparin   Injectable 2000 Unit(s) IV Push every 6 hours PRN For aPTT between 40 - 57  heparin   Injectable 4500 Unit(s) IV Push every 6 hours PRN For aPTT less than 40  oxycodone    5 mG/acetaminophen 325 mG 2 Tablet(s) Oral every 6 hours PRN Severe Pain (7 - 10)  zolpidem 5 milliGRAM(s) Oral at bedtime PRN Insomnia

## 2023-05-21 NOTE — DIETITIAN INITIAL EVALUATION ADULT - ORAL INTAKE PTA/DIET HISTORY
Pt reports good appetite PTA, denies any diet followed. Pt states she has had hip replacement done and since then she has been experiencing hip pain for over past 2 years. Pt states her "hip pain some sort of affect her appetite", but still able to consume "most of her meals". Pt endorses occasionally skipping meal due to the pain.

## 2023-05-21 NOTE — PROGRESS NOTE ADULT - PROBLEM SELECTOR PLAN 1
-On admission, pt w/ fever to 102 and tachycardia  -Exam c/f RLE cellulitis. 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 Alyson Mota during last admission)   > f/u Ortho recs     > no surgical intervention -On admission, pt w/ fever to 102 and tachycardia  -Exam c/f RLE cellulitis. 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 Alyson Mota during last admission)   > f/u Ortho recs  > Per discussion w/ ID & ortho, pt will either need to consent for prosthetic hip repair w/ ortho or be palliative w/ lifelong abx     > family decision regarding surgery is ongoing

## 2023-05-21 NOTE — PROGRESS NOTE ADULT - PROBLEM SELECTOR PLAN 8
-Pt w/ hx of anxiety, takes clonazepam 0.5 q8 and zolpidem 5mg qhs, remeron  -s/p CIWA bundle  > c/w home clonazepam .5 q8 -Pt w/ hx of anxiety, takes clonazepam 0.5 q8 and zolpidem 5mg qhs, remeron  -s/p CIWA bundle  > c/w home clonazepam 0.5 TID

## 2023-05-21 NOTE — DIETITIAN INITIAL EVALUATION ADULT - PROBLEM SELECTOR PLAN 9
Diagnosed w/ PAD during last admission.  ANDREA/PVR:  Moderate bilateral lower extremity arterial flow limitation localizing to the popliteal and infrapopliteal levels. s/p angio; no plan for surgical intervention per vascular during last admission, on ASA and atorvastatin 40mg.  - can hold home meds given mental status

## 2023-05-21 NOTE — DIETITIAN INITIAL EVALUATION ADULT - PROBLEM SELECTOR PLAN 1
On admission, pt w/ fever to 102 and tachycardia. Exam c/f RLE cellulitis. 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 CXR clear and UA unremarkable.   - s/p 2L in ED; give additional 1L  - c/w broad spectrum abx w/ cefepime and vanco  - f/u blood cx  - f/u xray of R leg  -Consider ID and ortho consult in AM regarding possible source of sepsis after blood culture comes back

## 2023-05-21 NOTE — PROGRESS NOTE ADULT - SUBJECTIVE AND OBJECTIVE BOX
Jarod Diaz  PGY-1 Resident Physician     Patient is a 77y old  Female who presents with a chief complaint of AMS (20 May 2023 07:19)      SUBJECTIVE / OVERNIGHT EVENTS:  -NAEON  -Endorsed o/n pain; remaining ROS (-)    MEDICATIONS  (STANDING):  albuterol/ipratropium for Nebulization 3 milliLiter(s) Nebulizer every 6 hours  bacitracin   Ointment 1 Application(s) Topical two times a day  clonazePAM  Tablet 0.5 milliGRAM(s) Oral every 12 hours  DAPTOmycin IVPB 300 milliGRAM(s) IV Intermittent every 24 hours  heparin  Infusion.  Unit(s)/Hr (11 mL/Hr) IV Continuous <Continuous>  melatonin 6 milliGRAM(s) Oral at bedtime  nicotine -  14 mG/24Hr(s) Patch 1 Patch Transdermal daily  NIFEdipine XL 60 milliGRAM(s) Oral daily  oxyCODONE  ER Tablet 10 milliGRAM(s) Oral every 12 hours    MEDICATIONS  (PRN):  heparin   Injectable 2000 Unit(s) IV Push every 6 hours PRN For aPTT between 40 - 57  heparin   Injectable 4500 Unit(s) IV Push every 6 hours PRN For aPTT less than 40  oxyCODONE    IR 5 milliGRAM(s) Oral every 4 hours PRN Severe Pain (7 - 10)  zolpidem 5 milliGRAM(s) Oral at bedtime PRN Insomnia    Allergies    No Known Allergies    Intolerances        Vital Signs Last 24 Hrs  T(C): 36.8 (21 May 2023 05:00), Max: 36.8 (20 May 2023 12:43)  T(F): 98.2 (21 May 2023 05:00), Max: 98.3 (20 May 2023 12:43)  HR: 63 (21 May 2023 05:00) (63 - 76)  BP: 129/66 (21 May 2023 05:00) (126/68 - 130/62)  BP(mean): --  RR: 18 (21 May 2023 05:00) (18 - 18)  SpO2: 95% (21 May 2023 05:00) (94% - 96%)    Parameters below as of 21 May 2023 05:00  Patient On (Oxygen Delivery Method): nasal cannula  O2 Flow (L/min): 2    Daily     Daily   I&O's Summary    20 May 2023 07:01  -  21 May 2023 07:00  --------------------------------------------------------  IN: 600 mL / OUT: 1300 mL / NET: -700 mL        Physical Exam  Constitutional: NAD; somnolent, arousable  Head: NC/AT  Eyes: pupils sluggish reactive to light, anicteric sclera  ENT: no nasal discharge; dry MM  Neck: supple; no JVD  Respiratory: CTA B/L; no W/R/R  Cardiac: systolic ejection murmur, RRR  Gastrointestinal: soft, NT/ND; no rebound or guarding  Extremities: WWP, no clubbing or cyanosis; no peripheral edema.  Musculoskeletal: RLE erythema, no warmth  Vascular: 2+ radial, DP/PT pulses B/L  Dermatologic: skin warm, dry  Neuro: A&Ox4     DIAGNOSTICS:                         9.9    6.70  )-----------( 233      ( 20 May 2023 07:00 )             31.7     Hgb Trend: 9.9<--, 9.8<--, 9.4<--, 8.7<--, 8.2<--  05-20    135  |  95<L>  |  20  ----------------------------<  114<H>  3.8   |  29  |  0.60    Ca    9.0      20 May 2023 07:00  Phos  3.0     05-20  Mg     1.90     05-20    TPro  8.1  /  Alb  3.3  /  TBili  0.5  /  DBili  x   /  AST  26  /  ALT  14  /  AlkPhos  71  05-20    CAPILLARY BLOOD GLUCOSE        Creatinine Trend: 0.60<--, 0.77<--, 1.02<--, 1.06<--, 2.06<--, 0.97<--  LIVER FUNCTIONS - ( 20 May 2023 07:00 )  Alb: 3.3 g/dL / Pro: 8.1 g/dL / ALK PHOS: 71 U/L / ALT: 14 U/L / AST: 26 U/L / GGT: x           PT/INR - ( 20 May 2023 07:00 )   PT: 14.8 sec;   INR: 1.27 ratio         PTT - ( 20 May 2023 21:50 )  PTT:63.5 sec

## 2023-05-21 NOTE — DIETITIAN INITIAL EVALUATION ADULT - ADD RECOMMEND
1) Recommend continue with current diet, which remains appropriate at this time.   2) Encourage PO intake and honor food preferences as able. Will provide Magic Cup 1x daily (290kcal, 9gm pro).  3) Monitor PO intake, Labs, weights, BMs, and skin integrity.   4) RD to remain available for further nutritional interventions as indicated.

## 2023-05-21 NOTE — PROGRESS NOTE ADULT - PROBLEM SELECTOR PLAN 10
DVT: heparin subq  Diet: regular diet  Dispo: pending clinical improvement  Code Status: pending DVT: heparin subq  Diet: regular diet  Dispo: inpt mgmt  Code Status: pending

## 2023-05-21 NOTE — DIETITIAN INITIAL EVALUATION ADULT - PERTINENT LABORATORY DATA
05-21    139  |  99  |  24<H>  ----------------------------<  85  3.5   |  28  |  0.67    Ca    8.7      21 May 2023 05:30  Phos  3.2     05-21  Mg     2.10     05-21    TPro  7.7  /  Alb  3.2<L>  /  TBili  0.4  /  DBili  x   /  AST  22  /  ALT  10  /  AlkPhos  66  05-21  A1C with Estimated Average Glucose Result: 5.1 % (05-17-23 @ 18:42)

## 2023-05-21 NOTE — DIETITIAN INITIAL EVALUATION ADULT - PROBLEM SELECTOR PLAN 5
Pt w/ hx of anxiety, takes clonazepam 0.5 q8 and zolpidem 5mg qhs, remeron  - Pt w/ prior hx of withdrawal from benzo, will place on CIWA for now. Hold home meds

## 2023-05-21 NOTE — DIETITIAN INITIAL EVALUATION ADULT - NS FNS WEIGHT CHANGE REASON
Pt reported UBW 126lbs. As per HIE, pt's previous weight 4/19-110lbs. Most recent adm weight 5/.12lbs, however pt noted with 2+ rt foot and leg edema which is masking her weight loss.

## 2023-05-21 NOTE — DIETITIAN INITIAL EVALUATION ADULT - PROBLEM SELECTOR PLAN 2
Pt presenting w/ AMS. Arousable, but somnolent on presentation. S/p narcan x2 with some improvement. C/f septic encephalopathy vs drug induced encephalopathy vs ETOH withdrawal. Pt recently discharged from rehab few days ago and per family, no alcohol use making ETOH lower in differential. Pt on Lomotil based on I-STOP; Atropine overdose to consider given hyperthermia, tachycardia, dry MM, and urinary retention; however, difficult to differentiate from sepsis. Pt also on benzo and opioids    - Spoke w/ tox fellow overnight, primary presentation likely not 2/2 drug-induced encephalopathy, but likely contributing. Advised against treating for atropine overdose. Also advised to hold further narcan as pt RR stable and pt not retaining  on blood gas.   - infectious work-up below  - fall precautions, seizure precaution, aspiration precaution  - Will obtain CTH to r/o structural abnormality

## 2023-05-21 NOTE — PROGRESS NOTE ADULT - PROBLEM SELECTOR PLAN 2
-Pt w/ right hip pain chronic i/s/o chronic OM vs acute OM   -On lidocaine 4%, percocet based on I-STOP  -CT right leg w/o overt signs of OM, but progressive cellulitis   > pain regimen oxy 5/10 (moderate/severe) -Pt w/ right hip pain chronic i/s/o chronic OM vs acute OM   -On lidocaine 4%, percocet based on I-STOP  -CT right leg w/o overt signs of OM, but progressive cellulitis   > pain regimen percocet 5/325 (q4 PRN) per home meds -Pt w/ right hip pain chronic i/s/o chronic OM vs acute OM   -On lidocaine 4%, percocet based on I-STOP  -CT right leg w/o overt signs of OM, but progressive cellulitis   > pain regimen percocet 5/325 x 2 tab (q6 PRN) per home meds

## 2023-05-21 NOTE — PROVIDER CONTACT NOTE (OTHER) - ACTION/TREATMENT ORDERED:
patient got first dose of percocet for pain. 1 after receiving pain medicine patient still in same level of pain. team aware  awaiting orders

## 2023-05-22 LAB
ALBUMIN SERPL ELPH-MCNC: 3.1 G/DL — LOW (ref 3.3–5)
ALP SERPL-CCNC: 60 U/L — SIGNIFICANT CHANGE UP (ref 40–120)
ALT FLD-CCNC: 12 U/L — SIGNIFICANT CHANGE UP (ref 4–33)
ANION GAP SERPL CALC-SCNC: 10 MMOL/L — SIGNIFICANT CHANGE UP (ref 7–14)
APTT BLD: 146.2 SEC — CRITICAL HIGH (ref 27–36.3)
APTT BLD: 35.1 SEC — SIGNIFICANT CHANGE UP (ref 27–36.3)
AST SERPL-CCNC: 16 U/L — SIGNIFICANT CHANGE UP (ref 4–32)
BASOPHILS # BLD AUTO: 0.02 K/UL — SIGNIFICANT CHANGE UP (ref 0–0.2)
BASOPHILS NFR BLD AUTO: 0.5 % — SIGNIFICANT CHANGE UP (ref 0–2)
BILIRUB SERPL-MCNC: 0.2 MG/DL — SIGNIFICANT CHANGE UP (ref 0.2–1.2)
BUN SERPL-MCNC: 24 MG/DL — HIGH (ref 7–23)
CALCIUM SERPL-MCNC: 8.6 MG/DL — SIGNIFICANT CHANGE UP (ref 8.4–10.5)
CHLORIDE SERPL-SCNC: 100 MMOL/L — SIGNIFICANT CHANGE UP (ref 98–107)
CO2 SERPL-SCNC: 28 MMOL/L — SIGNIFICANT CHANGE UP (ref 22–31)
CREAT SERPL-MCNC: 0.65 MG/DL — SIGNIFICANT CHANGE UP (ref 0.5–1.3)
CULTURE RESULTS: SIGNIFICANT CHANGE UP
CULTURE RESULTS: SIGNIFICANT CHANGE UP
EGFR: 92 ML/MIN/1.73M2 — SIGNIFICANT CHANGE UP
EOSINOPHIL # BLD AUTO: 0.21 K/UL — SIGNIFICANT CHANGE UP (ref 0–0.5)
EOSINOPHIL NFR BLD AUTO: 4.7 % — SIGNIFICANT CHANGE UP (ref 0–6)
GLUCOSE SERPL-MCNC: 91 MG/DL — SIGNIFICANT CHANGE UP (ref 70–99)
HCT VFR BLD CALC: 28.7 % — LOW (ref 34.5–45)
HGB BLD-MCNC: 9 G/DL — LOW (ref 11.5–15.5)
IANC: 3.15 K/UL — SIGNIFICANT CHANGE UP (ref 1.8–7.4)
IMM GRANULOCYTES NFR BLD AUTO: 0.2 % — SIGNIFICANT CHANGE UP (ref 0–0.9)
LYMPHOCYTES # BLD AUTO: 0.59 K/UL — LOW (ref 1–3.3)
LYMPHOCYTES # BLD AUTO: 13.3 % — SIGNIFICANT CHANGE UP (ref 13–44)
MAGNESIUM SERPL-MCNC: 2 MG/DL — SIGNIFICANT CHANGE UP (ref 1.6–2.6)
MCHC RBC-ENTMCNC: 28.8 PG — SIGNIFICANT CHANGE UP (ref 27–34)
MCHC RBC-ENTMCNC: 31.4 GM/DL — LOW (ref 32–36)
MCV RBC AUTO: 91.7 FL — SIGNIFICANT CHANGE UP (ref 80–100)
MONOCYTES # BLD AUTO: 0.46 K/UL — SIGNIFICANT CHANGE UP (ref 0–0.9)
MONOCYTES NFR BLD AUTO: 10.4 % — SIGNIFICANT CHANGE UP (ref 2–14)
NEUTROPHILS # BLD AUTO: 3.15 K/UL — SIGNIFICANT CHANGE UP (ref 1.8–7.4)
NEUTROPHILS NFR BLD AUTO: 70.9 % — SIGNIFICANT CHANGE UP (ref 43–77)
NRBC # BLD: 0 /100 WBCS — SIGNIFICANT CHANGE UP (ref 0–0)
NRBC # FLD: 0 K/UL — SIGNIFICANT CHANGE UP (ref 0–0)
PHOSPHATE SERPL-MCNC: 3.6 MG/DL — SIGNIFICANT CHANGE UP (ref 2.5–4.5)
PLATELET # BLD AUTO: 246 K/UL — SIGNIFICANT CHANGE UP (ref 150–400)
POTASSIUM SERPL-MCNC: 4 MMOL/L — SIGNIFICANT CHANGE UP (ref 3.5–5.3)
POTASSIUM SERPL-SCNC: 4 MMOL/L — SIGNIFICANT CHANGE UP (ref 3.5–5.3)
PROT SERPL-MCNC: 7.6 G/DL — SIGNIFICANT CHANGE UP (ref 6–8.3)
RBC # BLD: 3.13 M/UL — LOW (ref 3.8–5.2)
RBC # FLD: 12.8 % — SIGNIFICANT CHANGE UP (ref 10.3–14.5)
SODIUM SERPL-SCNC: 138 MMOL/L — SIGNIFICANT CHANGE UP (ref 135–145)
SPECIMEN SOURCE: SIGNIFICANT CHANGE UP
SPECIMEN SOURCE: SIGNIFICANT CHANGE UP
WBC # BLD: 4.44 K/UL — SIGNIFICANT CHANGE UP (ref 3.8–10.5)
WBC # FLD AUTO: 4.44 K/UL — SIGNIFICANT CHANGE UP (ref 3.8–10.5)

## 2023-05-22 PROCEDURE — 99232 SBSQ HOSP IP/OBS MODERATE 35: CPT

## 2023-05-22 PROCEDURE — 99498 ADVNCD CARE PLAN ADDL 30 MIN: CPT

## 2023-05-22 PROCEDURE — 99497 ADVNCD CARE PLAN 30 MIN: CPT | Mod: 25

## 2023-05-22 PROCEDURE — 99233 SBSQ HOSP IP/OBS HIGH 50: CPT | Mod: GC

## 2023-05-22 PROCEDURE — 99233 SBSQ HOSP IP/OBS HIGH 50: CPT

## 2023-05-22 RX ORDER — HEPARIN SODIUM 5000 [USP'U]/ML
1600 INJECTION INTRAVENOUS; SUBCUTANEOUS
Qty: 25000 | Refills: 0 | Status: DISCONTINUED | OUTPATIENT
Start: 2023-05-22 | End: 2023-05-23

## 2023-05-22 RX ORDER — MORPHINE SULFATE 50 MG/1
0.5 CAPSULE, EXTENDED RELEASE ORAL ONCE
Refills: 0 | Status: DISCONTINUED | OUTPATIENT
Start: 2023-05-22 | End: 2023-05-22

## 2023-05-22 RX ADMIN — Medication 0.5 MILLIGRAM(S): at 05:10

## 2023-05-22 RX ADMIN — ZOLPIDEM TARTRATE 5 MILLIGRAM(S): 10 TABLET ORAL at 22:37

## 2023-05-22 RX ADMIN — HEPARIN SODIUM 1600 UNIT(S)/HR: 5000 INJECTION INTRAVENOUS; SUBCUTANEOUS at 08:10

## 2023-05-22 RX ADMIN — Medication 3 MILLILITER(S): at 22:27

## 2023-05-22 RX ADMIN — DAPTOMYCIN 112 MILLIGRAM(S): 500 INJECTION, POWDER, LYOPHILIZED, FOR SOLUTION INTRAVENOUS at 22:46

## 2023-05-22 RX ADMIN — HEPARIN SODIUM 1800 UNIT(S)/HR: 5000 INJECTION INTRAVENOUS; SUBCUTANEOUS at 10:08

## 2023-05-22 RX ADMIN — HEPARIN SODIUM 4500 UNIT(S): 5000 INJECTION INTRAVENOUS; SUBCUTANEOUS at 10:13

## 2023-05-22 RX ADMIN — HEPARIN SODIUM 0 UNIT(S)/HR: 5000 INJECTION INTRAVENOUS; SUBCUTANEOUS at 18:38

## 2023-05-22 RX ADMIN — DAPTOMYCIN 112 MILLIGRAM(S): 500 INJECTION, POWDER, LYOPHILIZED, FOR SOLUTION INTRAVENOUS at 01:15

## 2023-05-22 RX ADMIN — Medication 60 MILLIGRAM(S): at 05:11

## 2023-05-22 RX ADMIN — Medication 0.5 MILLIGRAM(S): at 17:01

## 2023-05-22 RX ADMIN — Medication 1 APPLICATION(S): at 05:59

## 2023-05-22 RX ADMIN — HEPARIN SODIUM 1600 UNIT(S)/HR: 5000 INJECTION INTRAVENOUS; SUBCUTANEOUS at 20:20

## 2023-05-22 RX ADMIN — HEPARIN SODIUM 0 UNIT(S)/HR: 5000 INJECTION INTRAVENOUS; SUBCUTANEOUS at 19:20

## 2023-05-22 RX ADMIN — HEPARIN SODIUM 1600 UNIT(S)/HR: 5000 INJECTION INTRAVENOUS; SUBCUTANEOUS at 05:11

## 2023-05-22 NOTE — PROGRESS NOTE ADULT - CONVERSATION DETAILS
Referral to palliative care for complex decision making in setting of advanced illness. Met with patient at the bedside to discuss GOC - pt shared she's in uncontrollable pain and does not have much to say to us. Attempted to re-direct patient, pt shared that "Orthopedic doctors offered a surgery that would prevent her from walking again" - pt shared she is NOT interested in being "wheelchair bound for the rest of her life". Pt was able to verbalize that without this surgery the infection may never clear and will continue to spread. Attempted to discuss code status with patient who said " what does it matter im going to get cremated". Pt unwilling to continue conversation - deferred to daughter Brittni.     Called Brittni - explained the above conversation. Brittni understands that not pursing surgery may result in further spread of infection and may lead to death. Without surgery patient has a terminal process with life expectancy of 6 months or less if disease follows its natural course, and therefore would be an appropriate candidate for hospice.     Educated family on the philosophy of hospice care and explained the various settings and criteria in which hospice can be delivered (home vs. nursing home vs. inpatient hospice). Discussed the services provided under hospice services emphasizing the goal of elevating patient's quality of life and optimizing symptom management and avoiding unnecessary future hospitalizations. In addition to symptom management expertise, hospice provides supportive counseling services, nutritional support and chaplaincy services. At this time pt and daughter would like to continue with abx and rehab placement after discharge to attempt to regain some functional status.     Advanced care planning extensively discussed. Reviewed the risks and benefits of resuscitative measures including cardiopulmonary resuscitation and mechanical ventilation at the end of life in the setting of advanced malignancy/illness. Brittni understands that these interventions may pose more burden than benefit, however they would like attempt at CPR and mechanical ventilation at this time. The patient is a Full Code. Palliative to sign off at this time. Referral to palliative care for complex decision making in setting of advanced illness. Met with patient at the bedside to discuss GOC - pt shared she's in uncontrollable pain and does not have much to say to us. Attempted to re-direct patient, pt shared that "Orthopedic doctors offered a surgery that would prevent her from walking again" - pt shared she is NOT interested in being "wheelchair bound for the rest of her life". Pt was able to verbalize that without this surgery the infection may never clear and will continue to spread. Attempted to discuss code status with patient who said " what does it matter im going to get cremated". Pt unwilling to continue conversation - deferred to daughter Brittni.     Called Brittni - explained the above conversation. Brittni understands that not pursing surgery may result in further spread of infection and may lead to death. Without surgery patient has a terminal process with life expectancy of 6 months or less if disease follows its natural course, and therefore would be an appropriate candidate for hospice.     Educated family on the philosophy of hospice care and explained the various settings and criteria in which hospice can be delivered (home vs. nursing home vs. inpatient hospice). Discussed the services provided under hospice services emphasizing the goal of elevating patient's quality of life and optimizing symptom management and avoiding unnecessary future hospitalizations. In addition to symptom management expertise, hospice provides supportive counseling services, nutritional support and chaplaincy services. At this time pt and daughter would like to continue with abx and rehab placement after discharge to attempt to regain some functional status.     Advanced care planning extensively discussed. Reviewed the risks and benefits of resuscitative measures including cardiopulmonary resuscitation and mechanical ventilation at the end of life in the setting of advanced malignancy/illness. Brittni understands that these interventions may pose more burden than benefit, however they would like attempt at CPR and mechanical ventilation at this time. She states that patient has had prior intubations for COPD and has overcome them. The patient is a Full Code. Palliative to sign off at this time.

## 2023-05-22 NOTE — PROGRESS NOTE ADULT - CONVERSATION/DISCUSSION
Diagnosis/Prognosis/MOLST Discussed/Treatment Options/Hospice Referral/Palliative Care Referral
Diagnosis/Prognosis

## 2023-05-22 NOTE — PROGRESS NOTE ADULT - ASSESSMENT
78 Y/O F PMH Alcohol abuse, Anxiety, CAD (coronary artery disease), Emphysema, HLD, HTN, Migraine, hx of MRSA Bacteremia, Seizure disorder was brought in for change in mental status. Concern for sepsis, complicated by septic encephalopathy vs drug induced encephalopathy. 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  ID following.  Ortho consulted x3, had extensive discussion that patient may or may not have infection adequately controlled with surgical intervention and that patient will be unable to walk to entire removal of prosthesis and recommended palliative lifelong abx.  GOC conversation ongoing with family.

## 2023-05-22 NOTE — PROGRESS NOTE ADULT - SUBJECTIVE AND OBJECTIVE BOX
Patient is a 77y old  Female who presents with a chief complaint of AMS (22 May 2023 06:43)      SUBJECTIVE / OVERNIGHT EVENTS:    MEDICATIONS  (STANDING):  albuterol/ipratropium for Nebulization 3 milliLiter(s) Nebulizer every 6 hours  bacitracin   Ointment 1 Application(s) Topical two times a day  clonazePAM  Tablet 0.5 milliGRAM(s) Oral every 12 hours  DAPTOmycin IVPB 300 milliGRAM(s) IV Intermittent every 24 hours  heparin  Infusion.  Unit(s)/Hr (11 mL/Hr) IV Continuous <Continuous>  melatonin 6 milliGRAM(s) Oral at bedtime  nicotine -  14 mG/24Hr(s) Patch 1 Patch Transdermal daily  NIFEdipine XL 60 milliGRAM(s) Oral daily    MEDICATIONS  (PRN):  heparin   Injectable 2000 Unit(s) IV Push every 6 hours PRN For aPTT between 40 - 57  heparin   Injectable 4500 Unit(s) IV Push every 6 hours PRN For aPTT less than 40  oxycodone    5 mG/acetaminophen 325 mG 2 Tablet(s) Oral every 6 hours PRN Severe Pain (7 - 10)  zolpidem 5 milliGRAM(s) Oral at bedtime PRN Insomnia      CAPILLARY BLOOD GLUCOSE        I&O's Summary    21 May 2023 07:01  -  22 May 2023 07:00  --------------------------------------------------------  IN: 850 mL / OUT: 2000 mL / NET: -1150 mL        Vital Signs Last 24 Hrs  T(C): 36.6 (22 May 2023 05:00), Max: 36.7 (21 May 2023 11:30)  T(F): 97.8 (22 May 2023 05:00), Max: 98 (21 May 2023 11:30)  HR: 70 (22 May 2023 05:00) (70 - 76)  BP: 132/77 (22 May 2023 05:00) (113/57 - 139/61)  BP(mean): --  RR: 18 (22 May 2023 05:00) (17 - 18)  SpO2: 96% (22 May 2023 05:00) (95% - 96%)    Parameters below as of 22 May 2023 05:00  Patient On (Oxygen Delivery Method): room air        PHYSICAL EXAM:  GENERAL: NAD, well-developed  HEAD: Atraumatic, Normocephalic  EYES: EOMI, PERRLA, conjunctiva and sclera clear  NECK: Supple, No JVD  CHEST/LUNG: Clear to auscultation bilaterally; No wheezes or crackles  HEART: Normal S1/S2; Regular rate and rhythm; No murmurs, rubs, or gallops  ABDOMEN: Soft, Nontender, Nondistended; Bowel sounds present  EXTREMITIES: 2+ Peripheral Pulses; No clubbing, cyanosis, or edema  PSYCH: A&Ox3  NEUROLOGY: no focal neurologic deficit  SKIN: No rashes or lesions    LABS:                        9.6    4.47  )-----------( 242      ( 21 May 2023 05:30 )             30.3      05-21    139  |  99  |  24<H>  ----------------------------<  85  3.5   |  28  |  0.67    Ca    8.7      21 May 2023 05:30  Phos  3.2     05-21  Mg     2.10     05-21    TPro  7.7  /  Alb  3.2<L>  /  TBili  0.4  /  DBili  x   /  AST  22  /  ALT  10  /  AlkPhos  66  05-21    PTT - ( 21 May 2023 05:30 )  PTT:78.9 sec          RADIOLOGY & ADDITIONAL TESTS:    Imaging Personally Reviewed:    Consultant(s) Notes Reviewed:      Care Discussed with Consultants/Other Providers:   Patient is a 77y old  Female who presents with a chief complaint of AMS (22 May 2023 06:43)      SUBJECTIVE / OVERNIGHT EVENTS:    Pulled out IV early in am, new IV placed ~8am.    Pt with 10/10 pain that she says is mildly alleviated to 8/10 by percocet.      MEDICATIONS  (STANDING):  albuterol/ipratropium for Nebulization 3 milliLiter(s) Nebulizer every 6 hours  bacitracin   Ointment 1 Application(s) Topical two times a day  clonazePAM  Tablet 0.5 milliGRAM(s) Oral every 12 hours  DAPTOmycin IVPB 300 milliGRAM(s) IV Intermittent every 24 hours  heparin  Infusion.  Unit(s)/Hr (11 mL/Hr) IV Continuous <Continuous>  melatonin 6 milliGRAM(s) Oral at bedtime  nicotine -  14 mG/24Hr(s) Patch 1 Patch Transdermal daily  NIFEdipine XL 60 milliGRAM(s) Oral daily    MEDICATIONS  (PRN):  heparin   Injectable 2000 Unit(s) IV Push every 6 hours PRN For aPTT between 40 - 57  heparin   Injectable 4500 Unit(s) IV Push every 6 hours PRN For aPTT less than 40  oxycodone    5 mG/acetaminophen 325 mG 2 Tablet(s) Oral every 6 hours PRN Severe Pain (7 - 10)  zolpidem 5 milliGRAM(s) Oral at bedtime PRN Insomnia      CAPILLARY BLOOD GLUCOSE        I&O's Summary    21 May 2023 07:01  -  22 May 2023 07:00  --------------------------------------------------------  IN: 850 mL / OUT: 2000 mL / NET: -1150 mL        Vital Signs Last 24 Hrs  T(C): 36.6 (22 May 2023 05:00), Max: 36.7 (21 May 2023 11:30)  T(F): 97.8 (22 May 2023 05:00), Max: 98 (21 May 2023 11:30)  HR: 70 (22 May 2023 05:00) (70 - 76)  BP: 132/77 (22 May 2023 05:00) (113/57 - 139/61)  BP(mean): --  RR: 18 (22 May 2023 05:00) (17 - 18)  SpO2: 96% (22 May 2023 05:00) (95% - 96%)    Parameters below as of 22 May 2023 05:00  Patient On (Oxygen Delivery Method): room air        PHYSICAL EXAM:  GENERAL: agitated, ill-appearing  HEAD: Atraumatic, Normocephalic  EYES: EOMI, PERRLA, conjunctiva and sclera clear  NECK: Supple, No JVD  CHEST/LUNG: Clear to auscultation bilaterally; No wheezes or crackles  HEART: Normal S1/S2; Regular rate and rhythm; No murmurs, rubs, or gallops  ABDOMEN: Soft, Nontender, Nondistended; Bowel sounds present  EXTREMITIES: 2+ Peripheral Pulses; +R hip dressing, + alleveyne on R lateral malleolus  PSYCH: A&Ox3  NEUROLOGY: no focal neurologic deficit  SKIN: No rashes or lesions    LABS:                        9.6    4.47  )-----------( 242      ( 21 May 2023 05:30 )             30.3      05-21    139  |  99  |  24<H>  ----------------------------<  85  3.5   |  28  |  0.67    Ca    8.7      21 May 2023 05:30  Phos  3.2     05-21  Mg     2.10     05-21    TPro  7.7  /  Alb  3.2<L>  /  TBili  0.4  /  DBili  x   /  AST  22  /  ALT  10  /  AlkPhos  66  05-21    PTT - ( 21 May 2023 05:30 )  PTT:78.9 sec          RADIOLOGY & ADDITIONAL TESTS:    Imaging Personally Reviewed:    Consultant(s) Notes Reviewed:      Care Discussed with Consultants/Other Providers:

## 2023-05-22 NOTE — PROGRESS NOTE ADULT - PROBLEM SELECTOR PLAN 8
-Pt w/ hx of anxiety, takes clonazepam 0.5 q8 and zolpidem 5mg qhs, remeron  -s/p CIWA bundle  > c/w home clonazepam 0.5 TID

## 2023-05-22 NOTE — PROGRESS NOTE ADULT - PROBLEM SELECTOR PLAN 2
-Pt w/ right hip pain chronic i/s/o chronic OM vs acute OM   -On lidocaine 4%, percocet based on I-STOP  -CT right leg w/o overt signs of OM, but progressive cellulitis   > pain regimen percocet 5/325 x 2 tab (q6 PRN) per home meds -Pt w/ right hip pain chronic i/s/o chronic OM vs acute OM   -On lidocaine 4%, percocet based on I-STOP  -CT right leg w/o overt signs of OM, but progressive cellulitis   > pain regimen percocet 5/325 x 2 tab (q4 PRN) per home meds

## 2023-05-22 NOTE — PROGRESS NOTE ADULT - ASSESSMENT
76 Y/O F PMH Alcohol abuse, Anxiety, CAD (coronary artery disease), Emphysema, HLD, HTN, Migraine, MRSA Bacteremia, Seizure disorder was brought in for change in mental status. Hx obtained from chart review; limited hx from  from phone as  w/ hx of CVA.   Pt was recently admitted to [facility] for R hip pain; has had multiple hip surgeries in the past. During the admission, c/f recurrence of MRSA R hip chronic OM based on R hip aspiration. Pt was managed with daptomycin to complete at rehab until 5/14. Pt was discharged to home recently but pt unable to walk or perform any ADLs. Pt also talking less than usual. Per chart review, pt has a habit of taking too much medication. She is on klonipin and ambien at home, also on opioids.     EMS gave narcan x1. In the ED, pt only responsive to pain. Given another narcan and became more arousable. Pt also w/ fever and tachycardia. C/f sepsis, given 2L bolus and started on broad spectrum abx. Admitted to medicine for further management. Palliative care consulted for GOC.   76 Y/O F PMH Alcohol abuse, Anxiety, CAD (coronary artery disease), Emphysema, HLD, HTN, Migraine, MRSA Bacteremia, Seizure disorder was brought in for change in mental status. Hx obtained from chart review; limited hx from  from phone as  w/ hx of CVA.     Pt was recently admitted to [facility] for R hip pain; has had multiple hip surgeries in the past. During the admission, c/f recurrence of MRSA R hip chronic OM based on R hip aspiration. Pt was managed with daptomycin to complete at rehab until 5/14. Pt was discharged to home recently but pt unable to walk or perform any ADLs. Pt also talking less than usual. Per chart review, pt has a habit of taking too much medication. She is on klonipin and ambien at home, also on opioids.     EMS gave narcan x1. In the ED, pt only responsive to pain. Given another narcan and became more arousable. Pt also w/ fever and tachycardia. C/f sepsis, given 2L bolus and started on broad spectrum abx. Admitted to medicine for further management. Palliative care consulted for GOC.

## 2023-05-22 NOTE — PROGRESS NOTE ADULT - SUBJECTIVE AND OBJECTIVE BOX
[facility] Geriatrics and Palliative Care  Siobhan Pantoja, Palliative Care Nurse Practitioner  Contact Info: Page [phone] (Including Nights/Weekends), Message on Microsoft Teams (Siobhan Pantoja), or leave  at Palliative Office [phone] (non-urgent)    Date of Service 05-22-23 @ 13:27    SUBJECTIVE AND OBJECTIVE:  Indication for Geriatrics and Palliative Care Services/INTERVAL HPI: [facility]  Pt seen this AM, shared she was in severe pain and "didn't have much to say". Attempted to re-direct conversation about advance directives however continued to speak about her pain and did not answer direct questions. Pt did give us permission to speak to daughter Brittni.    OVERNIGHT EVENTS: Pt required PRN percocet 1 tablet x 1, 2 tablets x 4 within 24 hrs for pain. PRN Ambien 5mg.     DNR on chart:  Allergies    No Known Allergies    Intolerances    MEDICATIONS  (STANDING):  albuterol/ipratropium for Nebulization 3 milliLiter(s) Nebulizer every 6 hours  bacitracin   Ointment 1 Application(s) Topical two times a day  clonazePAM  Tablet 0.5 milliGRAM(s) Oral every 12 hours  DAPTOmycin IVPB 300 milliGRAM(s) IV Intermittent every 24 hours  heparin  Infusion.  Unit(s)/Hr (11 mL/Hr) IV Continuous <Continuous>  melatonin 6 milliGRAM(s) Oral at bedtime  nicotine -  14 mG/24Hr(s) Patch 1 Patch Transdermal daily  NIFEdipine XL 60 milliGRAM(s) Oral daily  oxycodone    5 mG/acetaminophen 325 mG 1 Tablet(s) Oral once    MEDICATIONS  (PRN):  heparin   Injectable 2000 Unit(s) IV Push every 6 hours PRN For aPTT between 40 - 57  heparin   Injectable 4500 Unit(s) IV Push every 6 hours PRN For aPTT less than 40  oxycodone    5 mG/acetaminophen 325 mG 2 Tablet(s) Oral every 4 hours PRN Severe Pain (7 - 10)  zolpidem 5 milliGRAM(s) Oral at bedtime PRN Insomnia      -------------------------------------------------------------------------------------------------------  ITEMS UNCHECKED ARE NOT PRESENT    PRESENT SYMPTOMS: [ ]Unable to self-report - see [ ] CPOT [ ] PAINADS [ ] RDOS  Source if other than patient:  [ ]Family   [ ]Team     Pain: [x]yes [ ]no  QOL impact - unable to walk  Location -right hip/ leg                Aggravating factors - movement   Quality - sharp  Radiation - none  Timing- constant   Severity (0-10 scale): 10  Minimal acceptable level (0-10 scale)/Pain goal: 0    CPOT:    https://www.[facility].org/getattachment/[email or web address]/Critical-Care-Pain-Observation-Tool-(CPOT)    PAINAD Score: See PAINAD tool and score below       RDOS: See RDOS tool and score below   0 to 2  minimal or no respiratory distress   3  mild distress  4 to 6 moderate distress  >7 severe distress    Dyspnea:                           [ ]Mild [ ]Moderate [ ]Severe  Anxiety:                             [ ]Mild [X]Moderate [ ]Severe  Fatigue:                             [ ]Mild [ ]Moderate [ ]Severe  Nausea:                             [ ]Mild [ ]Moderate [ ]Severe  Loss of appetite:              [ ]Mild [ ]Moderate [ ]Severe  Constipation:                    [ ]Mild [ ]Moderate [ ]Severe  Other Symptoms:  [X ]All other review of systems negative     Home Medications for Symptoms if present:    I Stop Reference no: [id number]    -------------------------------------------------------------------------------------------------------  PCSSQ[Palliative Care Spiritual Screening Question]   Severity (0-10):  Score of 4 or > indicate consideration of Chaplaincy referral.  Chaplaincy Referral: [ ] yes [ ] refused [ ] following [x ] Deferred     Caregiver [place]? : [ ] yes [ ] no [ ] Deferred [x] Declined             Social work referral [ ] Patient & Family Centered Care Referral [ ]     Anticipatory Grief present?:  [ ] yes [ ] no  [ X] Deferred                  Social work referral [ ] Chaplaincy Referral [ ]    -------------------------------------------------------------------------------------------------------  PHYSICAL EXAM:  Vital Signs Last 24 Hrs  T(C): 36.7 (22 May 2023 12:08), Max: 36.7 (22 May 2023 12:08)  T(F): 98 (22 May 2023 12:08), Max: 98 (22 May 2023 12:08)  HR: 67 (22 May 2023 12:08) (67 - 76)  BP: 149/68 (22 May 2023 12:08) (132/77 - 149/68)  BP(mean): --  RR: 17 (22 May 2023 12:08) (17 - 18)  SpO2: 99% (22 May 2023 12:08) (95% - 99%)    Parameters below as of 22 May 2023 12:08  Patient On (Oxygen Delivery Method): room air     I&O's Summary    21 May 2023 07:01  -  22 May 2023 07:00  --------------------------------------------------------  IN: 850 mL / OUT: 2000 mL / NET: -1150 mL    22 May 2023 07:01  -  22 May 2023 13:27  --------------------------------------------------------  IN: 400 mL / OUT: 900 mL / NET: -500 mL       GENERAL:  [ ]Cachexia  [X ]Alert  [X ]Oriented x3   [ ]Lethargic  [ ]Unarousable  [ X]Verbal  [ ]Non-Verbal    Behavioral:   [ X] Anxiety  [ ] Delirium [X ] Agitation [ ] Other    HEENT:  [ X]Normal   [ ]Dry mouth   [ ]ET Tube/Trach  [ ]Oral lesions    PULMONARY:   [ ]Clear [ ]Tachypnea  [ ]Audible excessive secretions   [ ]Rhonchi        [ ]Right [ ]Left [ ]Bilateral  [ ]Crackles        [ ]Right [ ]Left [ ]Bilateral  [ ]Wheezing     [ ]Right [ ]Left [ ]Bilateral  [X ]Diminished breath sounds [ ]right [ ]left [ ]bilateral    CARDIOVASCULAR:    [X ]Regular [ ]Irregular [ ]Tachy  [ ]Paul [ ]Murmur [ ]Other    GASTROINTESTINAL:  [X ]Soft  [ ]Distended   [X ]+BS  [X ]Non tender [ ]Tender  [ ]Other [ ]PEG [ ]OGT/ NGT  Last BM: 5/19    GENITOURINARY:  [X ]Normal [ ] Incontinent   [ ]Oliguria/Anuria   [ ]Silva    MUSCULOSKELETAL:   [ ]Normal   [ ]Weakness  [X ]Bed/Wheelchair bound [ ]Edema    NEUROLOGIC:   [X ]No focal deficits  [ ]Cognitive impairment  [ ]Dysphagia [ ]Dysarthria [ ]Paresis [ ]Other     SKIN:   [ ]Normal  [ ]Rash  [X ]Other BLLE wound, RLE cellulitis   [ ]Pressure ulcer(s)       Present on admission [ ]y [ ]n  -------------------------------------------------------------------------------------------------------  CRITICAL CARE:  [ ]Shock Present  [ ]Septic [ ]Cardiogenic [ ]Neurologic [ ]Hypovolemic  [ ]Vasopressors [ ]Inotropes  [ ]Respiratory failure present [ ]Mechanical Ventilation [ ]Non-invasive ventilatory support [ ]High-Flow   [ ]Acute  [ ]Chronic [ ]Hypoxic  [ ]Hypercarbic [ ]Other  [ ]Other organ failure     -------------------------------------------------------------------------------------------------------  LABS:                        9.0    4.44  )-----------( 246      ( 22 May 2023 08:00 )             28.7   05-22    138  |  100  |  24<H>  ----------------------------<  91  4.0   |  28  |  0.65    Ca    8.6      22 May 2023 08:00  Phos  3.6     05-22  Mg     2.00     05-22    TPro  7.6  /  Alb  3.1<L>  /  TBili  0.2  /  DBili  x   /  AST  16  /  ALT  12  /  AlkPhos  60  05-22  PTT - ( 22 May 2023 08:00 )  PTT:35.1 sec      -------------------------------------------------------------------------------------------------------  RADIOLOGY & ADDITIONAL STUDIES: < from: CT Lower Extremity w/ IV Cont, Right (05.19.23 @ 12:23) >    IMPRESSION:    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 There is unchanged edema in the region of the quadriceps   muscles extending from the level the hip to the level of the distal femur   with surrounding edema in the myofascial planes and subcutaneous fat.   This is suggestive of cellulitis. No drainable fluid collection. No   subcutaneous air. Technetium sulfur colloid marrow scan or indium labeled   White blood cell study would better assess for osteomyelitis if   clinically indicated.    --- End of Report ---    -------------------------------------------------------------------------------------------------------  Protein Calorie Malnutrition Present: [ ]mild [ ]moderate [ ]severe [ ]underweight [ ]morbid obesity  [email or web address]%[email or web address]-White%20JV%20et%[email or web address]    Height (cm): 160 (05-16-23 @ 20:09), 160 (04-19-23 @ 17:05)  Weight (kg): 57.5 (05-18-23 @ 06:14), 49.9 (04-19-23 @ 17:05)  BMI (kg/m2): 22.5 (05-18-23 @ 06:14), 19.5 (05-16-23 @ 20:09), 19.5 (04-19-23 @ 17:05)    Palliative Performance Status Version 2:   See PPSv2 tool and score below       [ ]PPSV2 < or = 30%  [ ]significant weight loss [ ]poor nutritional intake [ ]anasarca[ ]Artificial Nutrition    Other REFERRALS:  [ ]Hospice  [ ]Child Life  [ ]Social Work  [ ]Case management [ ]Holistic Therapy     -------------------------------------------------------------------------------------------------------  Goals of Care Document:

## 2023-05-22 NOTE — PROGRESS NOTE ADULT - PROBLEM SELECTOR PLAN 6
Thank you for allowing us to participate in your patient's care. We will continue to follow with you. Please page [phone] for any q's or c's. The Geriatric and Palliative Medicine service has coverage 24 hours a day/ 7 days a week to provide medical recommendations regarding symptom management needs via telephone.    Palliative to sign off at this time. Please re-consult as needed.

## 2023-05-22 NOTE — PROGRESS NOTE ADULT - NS ATTEND AMEND GEN_ALL_CORE FT
78 Y/O F PMH Alcohol abuse, Anxiety, CAD (coronary artery disease), Emphysema, HLD, HTN, Migraine, hx of MRSA Bacteremia, Seizure disorder was brought in for change in mental status. Concern for sepsis, complicated by septic encephalopathy vs drug induced encephalopathy. Palliative team consulted for complex decision making in setting of advanced illness.     Patient seen with Palliative NP. Patient c/o hip pain and requesting pain medications. She states that she is aware that she "won't be able to walk again" if she went through with surgery. Attempted to explore her understanding of her decision to not go through with surgery. When talking about her potential risk of death and advanced care planning, patient states she already has plans for cremation. Deferred further [facility] discussions with daughter, Brittni.   > Pain: would consult chronic pain as they saw patient during prior admission   > Appreciate ID recs - pt seems to be leaning toward chronic antibiotic suppression.   > Appreciate ortho recs- pt was offered orthopedic intervention but declined it.   > GOC: Patient remains a full code. Please see updated goc note 5/22.   > AT this time, goal is for patient to return to rehab with continued abx therapy. Palliative team signing off.

## 2023-05-22 NOTE — PROGRESS NOTE ADULT - PROBLEM SELECTOR PLAN 4
PPSV 50%  Pt needs assistance with most ALDs  As per pt non-ambulatory at home, wheelchair bound  PT recs appreciated   Skin care per nursing protocol.

## 2023-05-22 NOTE — PROVIDER CONTACT NOTE (OTHER) - ACTION/TREATMENT ORDERED:
MD made aware.  orderds pending MD made aware. As per MD keep patient on heparin drip until MD contacts family for goals of care. Will also continue to educate Pt on importance of taking scheduled blood work.

## 2023-05-22 NOTE — PROGRESS NOTE ADULT - SUBJECTIVE AND OBJECTIVE BOX
Discussed patient and course with patient's daughter at length on 5/21/2023. Ms. Srivastava is a 77 year old female, past medical history of CAD, CHF, HTN, MRSA bacteremia, who presented to the [facility] Emergency Department with altered mental status. Patient was found to have continued induration and pain in the right hip. She was found to have sepsis and cellulitis of the leg. Orthopedics was consulted for her continued prosthetic joint infection.    Patient was previously admitted to [facility] with right hip pain and a prosthetic joint infection. 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 She then underwent a Right USAMA by Dr. Jaime in 2018, but was complicated by a fall and periprosthetic fracture in 2018. She underwent ORIF and revision USAMA by Dr. Be. 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 Be. Patient presented to [facility] with right hip pain and stated that she was found to have MRSA bacteremia. Patient was treated with IV Vancomycin and a PICC line was placed. Per patient's daughter, a right hip aspiration showed a prosthetic joint infection. Patient subsequently presented to [facility] and was admitted to the medicine service. Orthopedics was consulted at the time and discussed Girdlestone Resection Arthroplasty. Discussed operative and nonoperative management at length with the patient and her daughter at that time. Patient and her daughter did not want operative management. She was started on antibiotic management by Infectious Disease.    Discussed with the patient's daughter that patient has a prosthetic joint infection. Discussed that due to her prior surgeries and remaining bone, her management is complex. Discussed due to her heterotopic ossification, history of infections, and remaining bone stock, operative management would consist of Girdlestone Resection Arthroplasty. Discussed that this would consist of removing the total hip arthroplasty without placing a spacer or any prosthesis. Discussed that patient would not have an articulating hip joint following the surgery. Discussed that it would be difficult to ambulate following Girdlestone Resection Arthroplasty, but may help with pain and infection control. Discussed that there is no guarantee of eradicating infection. Discussed the risks of operative management including, but not limited to, risks of anesthesia, heart attack, stroke, death, injuries to nerves and vessels, continued pain, recurrent or continued infection, difficulty with ambulation, fractures, and blood loss. Discussed that nonoperative management would consist of suppressive antibiotics. Discussed that this would allow the patient to keep her hip joint, but would not eradicate infection. Discussed the risks of nonoperative management including, but not limited to, continued pain, continued infection, worsening infection, and sepsis. Discussed that with or without surgery, patient would likely require lifelong antibiotics.     Patient's daughter would like to discuss options with the patient. At last conversation with patient, patient did not want operative management.    Plan:  -No acute orthopedic intervention at this time  -Pain Control  -Follow up Infectious Disease  -Patient will likely require lifelong antibiotics

## 2023-05-22 NOTE — PROGRESS NOTE ADULT - ATTENDING COMMENTS
Patient seen and examined earlier this afternoon. Complaining that she only got 1 tab of Percocet instead of 2 and is unhappy that her pain medications "got cut in half for no reason." Percocet had been reordered this AM as 1 tab q4h inadvertently instead of 2 tabs. Patient was able to state that she is not interested in surgery if she cannot walk again. She was also able to state that she understands that antibiotics will not cure her infection but may help to control the infection for the time being.    #OM of R hip with infected hardware- terminal infection if hardware is not removed, and no antibiotic therapy that can cure infection while hardware remains. Patient at this time electing to avoid surgery and wants to continue suppressive PO abx therapy if available. Pending culture sensitivities from [facility], will try to obtain from patient's daughter Brittni.  #Sepsis- present on admission, now resolved. C/w daptomycin per ID recs.  #RLE DVT- c/w heparin drip until decision regarding surgery is finalized, then can switch to DOAC  #Opioid use disorder- pain management team consulted. C/w Percocet 2 tabs q4h PRN. Avoid IV opioids. Given that patient has terminal infection and does not want surgery at this time, may be reasonable to start a long-acting opioid.  #Mild protein-calorie malnutrition- encourage PO intake, appreciate nutrition recs.    d/w Dr. Huerta

## 2023-05-22 NOTE — PROGRESS NOTE ADULT - SUBJECTIVE AND OBJECTIVE BOX
Follow Up:      Inverval History/ROS:Patient is a 77y old  Female who presents with a chief complaint of AMS (22 May 2023 13:19)    No fever  Mental status improved    Allergies    No Known Allergies    Intolerances        ANTIMICROBIALS:  DAPTOmycin IVPB 300 every 24 hours      OTHER MEDS:  albuterol/ipratropium for Nebulization 3 milliLiter(s) Nebulizer every 6 hours  bacitracin   Ointment 1 Application(s) Topical two times a day  clonazePAM  Tablet 0.5 milliGRAM(s) Oral every 12 hours  heparin   Injectable 2000 Unit(s) IV Push every 6 hours PRN  heparin   Injectable 4500 Unit(s) IV Push every 6 hours PRN  heparin  Infusion.  Unit(s)/Hr IV Continuous <Continuous>  melatonin 6 milliGRAM(s) Oral at bedtime  nicotine -  14 mG/24Hr(s) Patch 1 Patch Transdermal daily  NIFEdipine XL 60 milliGRAM(s) Oral daily  oxycodone    5 mG/acetaminophen 325 mG 2 Tablet(s) Oral every 4 hours PRN  zolpidem 5 milliGRAM(s) Oral at bedtime PRN      Vital Signs Last 24 Hrs  T(C): 36.7 (22 May 2023 12:08), Max: 36.7 (22 May 2023 12:08)  T(F): 98 (22 May 2023 12:08), Max: 98 (22 May 2023 12:08)  HR: 67 (22 May 2023 12:08) (67 - 76)  BP: 149/68 (22 May 2023 12:08) (132/77 - 149/68)  BP(mean): --  RR: 17 (22 May 2023 12:08) (17 - 18)  SpO2: 99% (22 May 2023 12:08) (95% - 99%)    Parameters below as of 22 May 2023 12:08  Patient On (Oxygen Delivery Method): room air        PHYSICAL EXAM:  General: [x ] non-toxic  HEAD/EYES: [ ] PERRL [x ] white sclera [ ] icterus  ENT:  [ ] normal [ x] supple [ ] thrush [ ] pharyngeal exudate  Cardiovascular:   [ ] murmur [ x] normal [ ] PPM/AICD  Respiratory:  [x ] clear to ausculation bilaterally  GI:  [x ] soft, non-tender, normal bowel sounds  :  [ ] edmondson [x ] no CVA tenderness   Musculoskeletal:  [ ] no synovitis  Neurologic:  [ ] non-focal exam   Skin:  [ x] induration over prosthetic joint  Lymph: [ ] no lymphadenopathy  Psychiatric:  [ ] appropriate affect [ ] alert & oriented  Lines:  x[ ] no phlebitis [ ] central line                                9.0    4.44  )-----------( 246      ( 22 May 2023 08:00 )             28.7       05-22    138  |  100  |  24<H>  ----------------------------<  91  4.0   |  28  |  0.65    Ca    8.6      22 May 2023 08:00  Phos  3.6     05-22  Mg     2.00     05-22    TPro  7.6  /  Alb  3.1<L>  /  TBili  0.2  /  DBili  x   /  AST  16  /  ALT  12  /  AlkPhos  60  05-22          MICROBIOLOGY:Culture Results:   <10,000 CFU/mL Normal Urogenital Mone (05-17-23 @ 00:30)  Culture Results:   No Growth Final (05-16-23 @ 23:30)  Culture Results:   No Growth Final (05-16-23 @ 23:17)      RADIOLOGY:

## 2023-05-22 NOTE — PROGRESS NOTE ADULT - PROBLEM SELECTOR PLAN 2
Pt endorses severe anxiety - on medications at home but unable to tell us what medication/dose   - Currently on Klonopin 0.5mg Q12hrs.

## 2023-05-22 NOTE — PROGRESS NOTE ADULT - PROBLEM SELECTOR PLAN 3
Pt endorses 10/10 right hip pain - current pain regimen percocet 2 tabs not helpful, hx of opioid abuse - ISTOP noted for Morphine, Percocet, Vicodin from multiple providers   - Followed by chronic pain last admission - would recommended consulting again to adjust regimen  - PRN Narcan   - Bowel regimen while on opioids.

## 2023-05-22 NOTE — PROGRESS NOTE ADULT - ASSESSMENT
77 year old with history of chronic osteomyelitis of the right hip with prior MRSA infection/ bacteremia. She presented with altered mental status    1) Periprosthetic chronic OM- prior joint revision  Prior Cultures with MRSA  On exam, there is warmth and induration over the right hip in spite of recent IV antibiotic course.    She still has induration and warmth over the right hip in spite of 6 weeks of iv antibiotics.   She has failed attempted treatment with antibiotics.     She has been evaluated by orthopedic surgery. As per d/w ortho, she is not a surgical candidate    Can continue Daptomycin for now- change to daily  Get culture results from [facility] to see if there is an oral option for chronic suppression.   If her MRSA is sensitive to doxycyline can change daptomycin to doxycyline 100 mg po q 12 indefinitely as a temporizing measure.     Antibiotics will not be able to cure this infection.  She will remain at high risk for local and systemic complication.        2) Right lateral malleolar ulcer  Wound care    3) Change in mental status  Likely multifactorial   Improved    Follow up as an oupt [phone]  Case and plan D/W attending

## 2023-05-22 NOTE — PROGRESS NOTE ADULT - PROBLEM SELECTOR PLAN 1
On admission, pt w/ fever to 102 and tachycardia  - Exam c/f RLE cellulitis. Also hx of R hip OM 2/2 MRSA  - Ortho consulted - operative management would consist of Girdlestone Resection Arthroplasty - which patient is refusing at this time therefore will most likely require lifelong abx   - ID following - on abx  - Managed by primary team.

## 2023-05-22 NOTE — PROGRESS NOTE ADULT - PROBLEM SELECTOR PLAN 1
-On admission, pt w/ fever to 102 and tachycardia  -Exam c/f RLE cellulitis. 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 Alyson Mota during last admission)   > f/u Ortho recs  > Per discussion w/ ID & ortho, pt will either need to consent for prosthetic hip repair w/ ortho or be palliative w/ lifelong abx     > family decision regarding surgery is ongoing -On admission, pt w/ fever to 102 and tachycardia  -Exam c/f RLE cellulitis. 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 Alyson Mota during last admission)   - ortho noted that prosthesis will need to be removed and patient will be unable to walk, unclear if intervention will necessarily control infection  > Per discussion w/ ID & ortho, pt will either need to consent for prosthetic hip repair w/ ortho or be palliative w/ lifelong abx     > family decision regarding surgery is ongoing

## 2023-05-23 LAB
ANION GAP SERPL CALC-SCNC: 12 MMOL/L — SIGNIFICANT CHANGE UP (ref 7–14)
BUN SERPL-MCNC: 18 MG/DL — SIGNIFICANT CHANGE UP (ref 7–23)
CALCIUM SERPL-MCNC: 8.6 MG/DL — SIGNIFICANT CHANGE UP (ref 8.4–10.5)
CHLORIDE SERPL-SCNC: 100 MMOL/L — SIGNIFICANT CHANGE UP (ref 98–107)
CO2 SERPL-SCNC: 25 MMOL/L — SIGNIFICANT CHANGE UP (ref 22–31)
CREAT SERPL-MCNC: 0.63 MG/DL — SIGNIFICANT CHANGE UP (ref 0.5–1.3)
EGFR: 92 ML/MIN/1.73M2 — SIGNIFICANT CHANGE UP
GLUCOSE SERPL-MCNC: 98 MG/DL — SIGNIFICANT CHANGE UP (ref 70–99)
HCT VFR BLD CALC: 29.5 % — LOW (ref 34.5–45)
HGB BLD-MCNC: 9.3 G/DL — LOW (ref 11.5–15.5)
MAGNESIUM SERPL-MCNC: 2.1 MG/DL — SIGNIFICANT CHANGE UP (ref 1.6–2.6)
MCHC RBC-ENTMCNC: 29.4 PG — SIGNIFICANT CHANGE UP (ref 27–34)
MCHC RBC-ENTMCNC: 31.5 GM/DL — LOW (ref 32–36)
MCV RBC AUTO: 93.4 FL — SIGNIFICANT CHANGE UP (ref 80–100)
NRBC # BLD: 0 /100 WBCS — SIGNIFICANT CHANGE UP (ref 0–0)
NRBC # FLD: 0 K/UL — SIGNIFICANT CHANGE UP (ref 0–0)
PHOSPHATE SERPL-MCNC: 3.8 MG/DL — SIGNIFICANT CHANGE UP (ref 2.5–4.5)
PLATELET # BLD AUTO: 259 K/UL — SIGNIFICANT CHANGE UP (ref 150–400)
POTASSIUM SERPL-MCNC: 3.8 MMOL/L — SIGNIFICANT CHANGE UP (ref 3.5–5.3)
POTASSIUM SERPL-SCNC: 3.8 MMOL/L — SIGNIFICANT CHANGE UP (ref 3.5–5.3)
RBC # BLD: 3.16 M/UL — LOW (ref 3.8–5.2)
RBC # FLD: 12.7 % — SIGNIFICANT CHANGE UP (ref 10.3–14.5)
SODIUM SERPL-SCNC: 137 MMOL/L — SIGNIFICANT CHANGE UP (ref 135–145)
WBC # BLD: 4.81 K/UL — SIGNIFICANT CHANGE UP (ref 3.8–10.5)
WBC # FLD AUTO: 4.81 K/UL — SIGNIFICANT CHANGE UP (ref 3.8–10.5)

## 2023-05-23 PROCEDURE — 93010 ELECTROCARDIOGRAM REPORT: CPT

## 2023-05-23 PROCEDURE — 99232 SBSQ HOSP IP/OBS MODERATE 35: CPT

## 2023-05-23 PROCEDURE — 99233 SBSQ HOSP IP/OBS HIGH 50: CPT | Mod: GC

## 2023-05-23 RX ORDER — HALOPERIDOL DECANOATE 100 MG/ML
2 INJECTION INTRAMUSCULAR ONCE
Refills: 0 | Status: COMPLETED | OUTPATIENT
Start: 2023-05-23 | End: 2023-05-23

## 2023-05-23 RX ORDER — CLONAZEPAM 1 MG
0.5 TABLET ORAL EVERY 12 HOURS
Refills: 0 | Status: DISCONTINUED | OUTPATIENT
Start: 2023-05-23 | End: 2023-05-25

## 2023-05-23 RX ORDER — RIVAROXABAN 15 MG-20MG
15 KIT ORAL
Refills: 0 | Status: DISCONTINUED | OUTPATIENT
Start: 2023-05-23 | End: 2023-05-31

## 2023-05-23 RX ORDER — HALOPERIDOL DECANOATE 100 MG/ML
2 INJECTION INTRAMUSCULAR EVERY 8 HOURS
Refills: 0 | Status: DISCONTINUED | OUTPATIENT
Start: 2023-05-23 | End: 2023-05-25

## 2023-05-23 RX ADMIN — RIVAROXABAN 15 MILLIGRAM(S): KIT at 21:46

## 2023-05-23 RX ADMIN — Medication 1 APPLICATION(S): at 07:54

## 2023-05-23 RX ADMIN — HEPARIN SODIUM 1600 UNIT(S)/HR: 5000 INJECTION INTRAVENOUS; SUBCUTANEOUS at 07:41

## 2023-05-23 RX ADMIN — HALOPERIDOL DECANOATE 2 MILLIGRAM(S): 100 INJECTION INTRAMUSCULAR at 10:01

## 2023-05-23 RX ADMIN — ZOLPIDEM TARTRATE 5 MILLIGRAM(S): 10 TABLET ORAL at 21:46

## 2023-05-23 RX ADMIN — Medication 0.5 MILLIGRAM(S): at 18:29

## 2023-05-23 RX ADMIN — Medication 6 MILLIGRAM(S): at 01:53

## 2023-05-23 RX ADMIN — Medication 0.5 MILLIGRAM(S): at 07:09

## 2023-05-23 RX ADMIN — Medication 1 APPLICATION(S): at 18:28

## 2023-05-23 RX ADMIN — Medication 60 MILLIGRAM(S): at 07:09

## 2023-05-23 NOTE — PROGRESS NOTE ADULT - SUBJECTIVE AND OBJECTIVE BOX
Patient is a 77y old  Female who presents with a chief complaint of AMS (22 May 2023 16:27)      SUBJECTIVE / OVERNIGHT EVENTS:    MEDICATIONS  (STANDING):  albuterol/ipratropium for Nebulization 3 milliLiter(s) Nebulizer every 6 hours  bacitracin   Ointment 1 Application(s) Topical two times a day  clonazePAM  Tablet 0.5 milliGRAM(s) Oral every 12 hours  DAPTOmycin IVPB 300 milliGRAM(s) IV Intermittent every 24 hours  heparin  Infusion. 1600 Unit(s)/Hr (16 mL/Hr) IV Continuous <Continuous>  melatonin 6 milliGRAM(s) Oral at bedtime  nicotine -  14 mG/24Hr(s) Patch 1 Patch Transdermal daily  NIFEdipine XL 60 milliGRAM(s) Oral daily    MEDICATIONS  (PRN):  heparin   Injectable 4500 Unit(s) IV Push every 6 hours PRN For aPTT less than 40  heparin   Injectable 2000 Unit(s) IV Push every 6 hours PRN For aPTT between 40 - 57  oxycodone    5 mG/acetaminophen 325 mG 2 Tablet(s) Oral every 4 hours PRN Severe Pain (7 - 10)  zolpidem 5 milliGRAM(s) Oral at bedtime PRN Insomnia      CAPILLARY BLOOD GLUCOSE        I&O's Summary    22 May 2023 07:01  -  23 May 2023 07:00  --------------------------------------------------------  IN: 100 mL / OUT: 1800 mL / NET: -1700 mL        Vital Signs Last 24 Hrs  T(C): 36.7 (22 May 2023 20:57), Max: 36.8 (22 May 2023 17:17)  T(F): 98 (22 May 2023 20:57), Max: 98.3 (22 May 2023 17:17)  HR: 66 (22 May 2023 20:57) (66 - 70)  BP: 153/68 (22 May 2023 20:57) (149/68 - 153/71)  BP(mean): --  RR: 17 (22 May 2023 20:57) (17 - 18)  SpO2: 96% (22 May 2023 20:57) (96% - 100%)    Parameters below as of 22 May 2023 20:57  Patient On (Oxygen Delivery Method): room air        PHYSICAL EXAM:  GENERAL: NAD, well-developed  HEAD: Atraumatic, Normocephalic  EYES: EOMI, PERRLA, conjunctiva and sclera clear  NECK: Supple, No JVD  CHEST/LUNG: Clear to auscultation bilaterally; No wheezes or crackles  HEART: Normal S1/S2; Regular rate and rhythm; No murmurs, rubs, or gallops  ABDOMEN: Soft, Nontender, Nondistended; Bowel sounds present  EXTREMITIES: 2+ Peripheral Pulses; No clubbing, cyanosis, or edema  PSYCH: A&Ox3  NEUROLOGY: no focal neurologic deficit  SKIN: No rashes or lesions    LABS:                        9.0    4.44  )-----------( 246      ( 22 May 2023 08:00 )             28.7      05-22    138  |  100  |  24<H>  ----------------------------<  91  4.0   |  28  |  0.65    Ca    8.6      22 May 2023 08:00  Phos  3.6     05-22  Mg     2.00     05-22    TPro  7.6  /  Alb  3.1<L>  /  TBili  0.2  /  DBili  x   /  AST  16  /  ALT  12  /  AlkPhos  60  05-22    PTT - ( 22 May 2023 17:54 )  PTT:146.2 sec          RADIOLOGY & ADDITIONAL TESTS:    Imaging Personally Reviewed:    Consultant(s) Notes Reviewed:      Care Discussed with Consultants/Other Providers:   Patient is a 77y old  Female who presents with a chief complaint of AMS (22 May 2023 16:27)      SUBJECTIVE / OVERNIGHT EVENTS:    Patient refused PTT o/n, asked for     MEDICATIONS  (STANDING):  albuterol/ipratropium for Nebulization 3 milliLiter(s) Nebulizer every 6 hours  bacitracin   Ointment 1 Application(s) Topical two times a day  clonazePAM  Tablet 0.5 milliGRAM(s) Oral every 12 hours  DAPTOmycin IVPB 300 milliGRAM(s) IV Intermittent every 24 hours  heparin  Infusion. 1600 Unit(s)/Hr (16 mL/Hr) IV Continuous <Continuous>  melatonin 6 milliGRAM(s) Oral at bedtime  nicotine -  14 mG/24Hr(s) Patch 1 Patch Transdermal daily  NIFEdipine XL 60 milliGRAM(s) Oral daily    MEDICATIONS  (PRN):  heparin   Injectable 4500 Unit(s) IV Push every 6 hours PRN For aPTT less than 40  heparin   Injectable 2000 Unit(s) IV Push every 6 hours PRN For aPTT between 40 - 57  oxycodone    5 mG/acetaminophen 325 mG 2 Tablet(s) Oral every 4 hours PRN Severe Pain (7 - 10)  zolpidem 5 milliGRAM(s) Oral at bedtime PRN Insomnia      CAPILLARY BLOOD GLUCOSE        I&O's Summary    22 May 2023 07:01  -  23 May 2023 07:00  --------------------------------------------------------  IN: 100 mL / OUT: 1800 mL / NET: -1700 mL        Vital Signs Last 24 Hrs  T(C): 36.7 (22 May 2023 20:57), Max: 36.8 (22 May 2023 17:17)  T(F): 98 (22 May 2023 20:57), Max: 98.3 (22 May 2023 17:17)  HR: 66 (22 May 2023 20:57) (66 - 70)  BP: 153/68 (22 May 2023 20:57) (149/68 - 153/71)  BP(mean): --  RR: 17 (22 May 2023 20:57) (17 - 18)  SpO2: 96% (22 May 2023 20:57) (96% - 100%)    Parameters below as of 22 May 2023 20:57  Patient On (Oxygen Delivery Method): room air        PHYSICAL EXAM:  GENERAL: NAD, well-developed  HEAD: Atraumatic, Normocephalic  EYES: EOMI, PERRLA, conjunctiva and sclera clear  NECK: Supple, No JVD  CHEST/LUNG: Clear to auscultation bilaterally; No wheezes or crackles  HEART: Normal S1/S2; Regular rate and rhythm; No murmurs, rubs, or gallops  ABDOMEN: Soft, Nontender, Nondistended; Bowel sounds present  EXTREMITIES: 2+ Peripheral Pulses; No clubbing, cyanosis, or edema  PSYCH: A&Ox3  NEUROLOGY: no focal neurologic deficit  SKIN: No rashes or lesions    LABS:                        9.0    4.44  )-----------( 246      ( 22 May 2023 08:00 )             28.7      05-22    138  |  100  |  24<H>  ----------------------------<  91  4.0   |  28  |  0.65    Ca    8.6      22 May 2023 08:00  Phos  3.6     05-22  Mg     2.00     05-22    TPro  7.6  /  Alb  3.1<L>  /  TBili  0.2  /  DBili  x   /  AST  16  /  ALT  12  /  AlkPhos  60  05-22    PTT - ( 22 May 2023 17:54 )  PTT:146.2 sec          RADIOLOGY & ADDITIONAL TESTS:    Imaging Personally Reviewed:    Consultant(s) Notes Reviewed:      Care Discussed with Consultants/Other Providers:   Patient is a 77y old  Female who presents with a chief complaint of AMS (22 May 2023 16:27)      SUBJECTIVE / OVERNIGHT EVENTS:    Patient refused PTT o/n.    This am, patient difficult to redirect, asking for additional pain meds.  stating that she wanted to walk and that her decision is hard.  Trying to get out of bed to walk.    MEDICATIONS  (STANDING):  albuterol/ipratropium for Nebulization 3 milliLiter(s) Nebulizer every 6 hours  bacitracin   Ointment 1 Application(s) Topical two times a day  clonazePAM  Tablet 0.5 milliGRAM(s) Oral every 12 hours  DAPTOmycin IVPB 300 milliGRAM(s) IV Intermittent every 24 hours  heparin  Infusion. 1600 Unit(s)/Hr (16 mL/Hr) IV Continuous <Continuous>  melatonin 6 milliGRAM(s) Oral at bedtime  nicotine -  14 mG/24Hr(s) Patch 1 Patch Transdermal daily  NIFEdipine XL 60 milliGRAM(s) Oral daily    MEDICATIONS  (PRN):  heparin   Injectable 4500 Unit(s) IV Push every 6 hours PRN For aPTT less than 40  heparin   Injectable 2000 Unit(s) IV Push every 6 hours PRN For aPTT between 40 - 57  oxycodone    5 mG/acetaminophen 325 mG 2 Tablet(s) Oral every 4 hours PRN Severe Pain (7 - 10)  zolpidem 5 milliGRAM(s) Oral at bedtime PRN Insomnia      CAPILLARY BLOOD GLUCOSE        I&O's Summary    22 May 2023 07:01  -  23 May 2023 07:00  --------------------------------------------------------  IN: 100 mL / OUT: 1800 mL / NET: -1700 mL        Vital Signs Last 24 Hrs  T(C): 36.7 (22 May 2023 20:57), Max: 36.8 (22 May 2023 17:17)  T(F): 98 (22 May 2023 20:57), Max: 98.3 (22 May 2023 17:17)  HR: 66 (22 May 2023 20:57) (66 - 70)  BP: 153/68 (22 May 2023 20:57) (149/68 - 153/71)  BP(mean): --  RR: 17 (22 May 2023 20:57) (17 - 18)  SpO2: 96% (22 May 2023 20:57) (96% - 100%)    Parameters below as of 22 May 2023 20:57  Patient On (Oxygen Delivery Method): room air        PHYSICAL EXAM:  GENERAL: agitated, ill-appearing  HEAD: Atraumatic, Normocephalic  EYES: EOMI, PERRLA, conjunctiva and sclera clear  NECK: Supple, No JVD  CHEST/LUNG: Clear to auscultation bilaterally; No wheezes or crackles  HEART: Normal S1/S2; Regular rate and rhythm; No murmurs, rubs, or gallops  ABDOMEN: Soft, Nontender, Nondistended; Bowel sounds present  EXTREMITIES: 2+ Peripheral Pulses; +R hip dressing, + alleveyne on R lateral malleolus  PSYCH: A&Ox3  NEUROLOGY: no focal neurologic deficit  SKIN: No rashes or lesions    LABS:                        9.0    4.44  )-----------( 246      ( 22 May 2023 08:00 )             28.7      05-22    138  |  100  |  24<H>  ----------------------------<  91  4.0   |  28  |  0.65    Ca    8.6      22 May 2023 08:00  Phos  3.6     05-22  Mg     2.00     05-22    TPro  7.6  /  Alb  3.1<L>  /  TBili  0.2  /  DBili  x   /  AST  16  /  ALT  12  /  AlkPhos  60  05-22    PTT - ( 22 May 2023 17:54 )  PTT:146.2 sec          RADIOLOGY & ADDITIONAL TESTS:    Imaging Personally Reviewed:    Consultant(s) Notes Reviewed:      Care Discussed with Consultants/Other Providers:

## 2023-05-23 NOTE — PROGRESS NOTE ADULT - ATTENDING COMMENTS
Patient seen and examined earlier this afternoon. She was agitated and trying to get out of bed this AM and received Haldol 2mg IV. This afternoon she is calm and pleasant. Complaining of pain in her right hip. Offered to increase Percocet or add long-acting opioids, but patient declined. Patient asked me to call her  Tyron and give him an update on her condition, which I did at her bedside. Patient understands that antibiotics will not cure her infection. She is still refusing surgery.    #OM of R hip with infected hardware- terminal infection if hardware is not removed, and no antibiotic therapy that can cure infection while hardware remains. Patient at this time electing to avoid surgery and wants to continue suppressive abx therapy if available. Culture data obtained from [facility] today, abx switched from daptomycin to doxycycline.  #Sepsis- present on admission, now resolved. C/w doxycycline per ID recs.  #RLE DVT- no plan for surgery at this time. Heparin drip d/lee. Start Xarelto today.  #Opioid use disorder- pain management team consulted. C/w Percocet 2 tabs q4h PRN. Avoid IV opioids. Given that patient has terminal infection and does not want surgery at this time, may be reasonable to start a long-acting opioid if she changes her mind in the future.  #Mild protein-calorie malnutrition- encourage PO intake, appreciate nutrition recs.    d/w Dr. Huerta

## 2023-05-23 NOTE — PROGRESS NOTE ADULT - SUBJECTIVE AND OBJECTIVE BOX
Follow Up:  R hip chronic osteomyelitis    Interval History/ROS:   Still with pain R hip, denies fever/chills  No diarrhea or abd pain    Allergies  No Known Allergies    ANTIMICROBIALS:  DAPTOmycin IVPB 300 every 24 hours    OTHER MEDS:  MEDICATIONS  (STANDING):  albuterol/ipratropium for Nebulization 3 every 6 hours  clonazePAM  Tablet 0.5 every 12 hours  haloperidol    Injectable 2 every 8 hours PRN  melatonin 6 at bedtime  NIFEdipine XL 60 daily  oxycodone    5 mG/acetaminophen 325 mG 2 every 4 hours PRN  rivaroxaban 15 two times a day with meals  zolpidem 5 at bedtime PRN    Vital Signs Last 24 Hrs  T(C): 36.8 (23 May 2023 13:00), Max: 36.8 (22 May 2023 17:17)  T(F): 98.2 (23 May 2023 13:00), Max: 98.3 (22 May 2023 17:17)  HR: 64 (23 May 2023 13:00) (64 - 70)  BP: 148/60 (23 May 2023 13:00) (144/70 - 160/75)  BP(mean): --  RR: 18 (23 May 2023 13:00) (17 - 18)  SpO2: 98% (23 May 2023 13:00) (96% - 100%)    Parameters below as of 23 May 2023 13:00  Patient On (Oxygen Delivery Method): room air    PHYSICAL EXAM:  Constitutional: no distress  HEAD/EYES: anicteric, no conjunctival injection  Respiratory:  nonlabored breathing  GI:  soft, non-tender  Musculoskeletal:  R hip tenderness, limited ROM  Neurologic: awake and alert  Skin:  erythema overlying R hip  Psychiatric:  calm, cooperative                        9.3    4.81  )-----------( 259      ( 23 May 2023 06:48 )             29.5       05-23    137  |  100  |  18  ----------------------------<  98  3.8   |  25  |  0.63    Ca    8.6      23 May 2023 06:48  Phos  3.8     05-23  Mg     2.10     05-23    TPro  7.6  /  Alb  3.1<L>  /  TBili  0.2  /  DBili  x   /  AST  16  /  ALT  12  /  AlkPhos  60  05-22      MICROBIOLOGY:  Rapid RVP Result: NotDetec (05-17 @ 03:01)    RADIOLOGY:    < from: CT Lower Extremity w/ IV Cont, Right (05.19.23 @ 12:23) >  IMPRESSION:  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 There is unchanged edema in the region of the quadriceps   muscles extending from the level the hip to the level of the distal femur   with surrounding edema in the myofascial planes and subcutaneous fat.   This is suggestive of cellulitis. No drainable fluid collection. No   subcutaneous air. Technetium sulfur colloid marrow scan or indium labeled   White blood cell study would better assess for osteomyelitis if   clinically indicated.  < end of copied text >

## 2023-05-23 NOTE — PROGRESS NOTE ADULT - ASSESSMENT
78 y/o F PMHx chronic osteomyelitis of the right hip with prior MRSA infection/bacteremia, initially presented with altered mental status. Patient is s/p recent completion of prolonged course of IV antibiotics for prosthetic joint infection of hip. ID now consulted for persistent infection.     Impression:  #Chronic Osteomyelitis R Hip  # 78 y/o F PMHx chronic osteomyelitis of the right hip with prior MRSA infection/bacteremia, initially presented with altered mental status. Patient is s/p recent completion of prolonged course of IV antibiotics for prosthetic joint infection of hip. ID now consulted for persistent infection.     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 She will remain high risk for local/systemic complications. Indefinite antibiotic therapy will only be a temporizing measure.      Melquiades Hart MD  Infectious Disease Fellow  Available on [employer] Teams  Before 9AM or after 5PM: [phone]

## 2023-05-23 NOTE — PROGRESS NOTE ADULT - ATTENDING COMMENTS
77 year old with history of chronic osteomyelitis of the right hip with prior MRSA infection/ bacteremia. She presented with altered mental status    1) Periprosthetic chronic OM- prior joint revision  Prior Cultures with MRSA  On exam, there is warmth and induration over the right hip in spite of recent IV antibiotic course.    She still has induration and warmth over the right hip in spite of 6 weeks of iv antibiotics.   She has failed attempted treatment with antibiotics.     She has been evaluated by orthopedic surgery. As per d/w ortho, she is not a surgical candidate    Can change daptomycin to doxycyline 100 mg po q 12 indefinitely as a temporizing measure.     Antibiotics will not be able to cure this infection.  She will remain at high risk for local and systemic complication.        2) Right lateral malleolar ulcer  Wound care    3) Change in mental status  Likely multifactorial   Improved    Follow up as an oupt [phone]    Call ID service with additional questions

## 2023-05-23 NOTE — CHART NOTE - NSCHARTNOTEFT_GEN_A_CORE
Patient signed out to ACP on 4N, Michelle (pager [phone]), at 1655 on 5/23/23    Herve Oliveira MD  PGY1 Internal Medicine  Available on TEAMS  [facility]: [phone], [facility]: [id number] Patient signed out to ACP on 4S, Holly Bermoe (pager [phone]), at [phone] on 5/23/23    Herve Oliveira MD  PGY1 Internal Medicine  Available on TEAMS  [facility]: [phone], [facility]: [phone]

## 2023-05-23 NOTE — PROGRESS NOTE ADULT - ASSESSMENT
78 Y/O F PMH Alcohol abuse, Anxiety, CAD (coronary artery disease), Emphysema, HLD, HTN, Migraine, hx of MRSA Bacteremia, Seizure disorder, 2018 R [facility] c/b MRSA osteomyelitis admitted for sepsis likely 2/2 R hip osteomyelitis, currently on daptomycin x6w.  ID following.  Ortho consulted x3, had extensive discussion that patient may or may not have infection adequately controlled with surgical intervention and that patient will be unable to walk to entire removal of prosthesis and recommended palliative lifelong abx.  GOC conversation ongoing with family. 78 Y/O F PMH Alcohol abuse, Anxiety, CAD (coronary artery disease), Emphysema, HLD, HTN, Migraine, hx of MRSA Bacteremia, Seizure disorder, 2018 R [facility] c/b MRSA osteomyelitis admitted for sepsis likely 2/2 R hip osteomyelitis, currently on daptomycin x6w.  ID following.  Ortho consulted x3, had extensive discussion that patient may or may not have infection adequately controlled with surgical intervention and that patient will be unable to walk to entire removal of prosthesis and recommended palliative lifelong abx.  Spoke to patient and daughter, who ultimately decided against surgery since it would remove her ability to walk and instead to opt for antibiotics and rehab.  Discussed in separate GOC note.

## 2023-05-23 NOTE — PROGRESS NOTE ADULT - PROBLEM SELECTOR PLAN 10
DVT: heparin subq  Diet: regular diet  Dispo: inpt mgmt  Code Status: pending DVT: xarelto  Diet: regular diet  Dispo: inpt mgmt  Code Status: full code

## 2023-05-23 NOTE — PROGRESS NOTE ADULT - PROBLEM SELECTOR PLAN 3
-(+) right venous thrombus DVT (5/18)  > c/w heparin drip d/t potential ortho procedure  > consider transition to Xarelto -(+) right venous thrombus DVT (5/18)  > switched from heparin to xarelto as patient not opting for operation, also refusing PTT

## 2023-05-23 NOTE — PROGRESS NOTE ADULT - PROBLEM SELECTOR PLAN 2
-Pt w/ right hip pain chronic i/s/o chronic OM vs acute OM   -On lidocaine 4%, percocet based on I-STOP  -CT right leg w/o overt signs of OM, but progressive cellulitis   > pain regimen percocet 5/325 x 2 tab (q4 PRN) per home meds

## 2023-05-23 NOTE — CONSULT NOTE ADULT - SUBJECTIVE AND OBJECTIVE BOX
Chief Complaint: right hip and LE pain    HPI:  76 Y/O F PMH Alcohol abuse, Anxiety, CAD (coronary artery disease), Emphysema, HLD, HTN, Migraine, MRSA Bacteremia, Seizure disorder was brought in for change in mental status. Hx obtained from chart review; limited hx from  from phone as  w/ hx of CVA    Pt was recently admitted to [facility] for R hip pain; has had multiple hip surgeries in the past. During the admission, c/f recurrence of MRSA R hip chronic OM based on R hip aspiration. Pt was managed with daptomycin to complete at rehab until 5/14. Pt was discharged to home recently but pt unable to walk or perform any ADLs. Pt also talking less than usual. Per chart review, pt has a habit of taking too much medication. She is on klonipin and ambien at home, also on opioids.     On evaluation, pt arousable, but somnolent. Denies any complaints. Spoke w/ covering nurse who notified provider she received pt like this. Protecting airway.     Unable to obtain any further hx from pt or . Attempted to call daughter, but unable to reach. Unable to confirm current meds.     ED course:  EMS gave narcan x1. In the ED, pt only responsive to pain. Given another narcan and became more arousable. Pt also w/ fever and tachycardia. C/f sepsis, given 2L bolus and started on broad spectrum abx. Admitted to medicine for further management (17 May 2023 02:07)      PAST MEDICAL & SURGICAL HISTORY:  Hypertension  Hyperlipidemia  CAD (coronary artery disease)  Migraine  Anxiety  Emphysema, unspecified  HTN (hypertension)  Anxiety  Coronary artery disease  Stented coronary artery  Seizure  Alcohol abuse  Migraine  Pain of right hip joint  MRSA bacteremia  Essential hypertension  CAD (coronary artery disease)  Elevated brain natriuretic peptide (BNP) level  S/P bladder repair  Status post total hip replacement, right  5/21/18  History of arthroplasty of right hip          FAMILY HISTORY:  Family history of coronary artery disease in daughter (Child)  Family history of essential hypertension        SOCIAL HISTORY:  [x ] Denies Smoking, Alcohol, or Drug Use    Allergies  No Known Allergies    PAIN MEDICATIONS:  clonazePAM  Tablet 0.5 milliGRAM(s) Oral every 12 hours  haloperidol    Injectable 2 milliGRAM(s) IV Push every 8 hours PRN  melatonin 6 milliGRAM(s) Oral at bedtime  oxycodone    5 mG/acetaminophen 325 mG 2 Tablet(s) Oral every 4 hours PRN  zolpidem 5 milliGRAM(s) Oral at bedtime PRN    Heme:  rivaroxaban 15 milliGRAM(s) Oral two times a day with meals    Antibiotics:  DAPTOmycin IVPB 300 milliGRAM(s) IV Intermittent every 24 hours    Cardiovascular:  NIFEdipine XL 60 milliGRAM(s) Oral daily    GI:    Endocrine:    All Other Medications:  bacitracin   Ointment 1 Application(s) Topical two times a day  nicotine -  14 mG/24Hr(s) Patch 1 Patch Transdermal daily      Vital Signs Last 24 Hrs  T(C): 36.8 (23 May 2023 13:00), Max: 36.8 (22 May 2023 17:17)  T(F): 98.2 (23 May 2023 13:00), Max: 98.3 (22 May 2023 17:17)  HR: 64 (23 May 2023 13:00) (64 - 70)  BP: 148/60 (23 May 2023 13:00) (144/70 - 160/75)  BP(mean): --  RR: 18 (23 May 2023 13:00) (17 - 18)  SpO2: 98% (23 May 2023 13:00) (96% - 100%)    Parameters below as of 23 May 2023 13:00  Patient On (Oxygen Delivery Method): room air        PAIN SCORE:    10/10    SCALE USED: (1-10 VNRS)           LABS:                          9.3    4.81  )-----------( 259      ( 23 May 2023 06:48 )             29.5     05-23    137  |  100  |  18  ----------------------------<  98  3.8   |  25  |  0.63    Ca    8.6      23 May 2023 06:48  Phos  3.8     05-23  Mg     2.10     05-23    TPro  7.6  /  Alb  3.1<L>  /  TBili  0.2  /  DBili  x   /  AST  16  /  ALT  12  /  AlkPhos  60  05-22    PTT - ( 22 May 2023 17:54 )  PTT:146.2 sec          [ x]  [place]  Reviewed. Reference#[id number]      Summary:  Patient seen at bedside with complaints of 10/10 right hip and LE pain. States the pain has been present for several months with OM infection. Describes the pain as sharp, stinging, stabbing radiating down to her foot. She is expressing concern that she will not be able to ambulate in the future. Patient states she wants to stay on current regimen of Percocet 10mg Q4H PRN. While at [facility], patient has been receiving Percocet Q4H, but it is unclear how often patient is receiving medication and ISTOP does not show consistent frequency on orders. Patient is vague with dose and how frequently she is taking Percocet. Also endorsing use of Gabapentin possibly BID but also unsure of dose. States she does not like using muscle relaxants. Reports taking Klonopin 0.5mg BID for anxiety. Denies smoking, alcohol and illicit drug use.     PHYSICAL EXAM:  GENERAL: Alert & Oriented x 3 in NAD. Sitting on edge of bed. Maintains eye contact, answers questions appropriately.     Recommendations:  -  Consider ordering Gabapentin 300mg Q8H. Hold for sedation.   -  Recommend maintaining continuous pulse oximetry.  -  Recommend follow up with Chronic Pain doctor when discharged. If patient does not have a Chronic Pain doctor, may acquire one through patient's personal insurance carrier.    Will notify Chronic Pain attending on call, Dr. Bernal. Awaiting call back.   No further recommendations at this time, Chronic pain service to sign off. May call Chronic Pain Service if needed.   Thank you.       Chief Complaint: right hip and LE pain    HPI:  78 Y/O F PMH Alcohol abuse, Anxiety, CAD (coronary artery disease), Emphysema, HLD, HTN, Migraine, MRSA Bacteremia, Seizure disorder was brought in for change in mental status. Hx obtained from chart review; limited hx from  from phone as  w/ hx of CVA    Pt was recently admitted to [facility] for R hip pain; has had multiple hip surgeries in the past. During the admission, c/f recurrence of MRSA R hip chronic OM based on R hip aspiration. Pt was managed with daptomycin to complete at rehab until 5/14. Pt was discharged to home recently but pt unable to walk or perform any ADLs. Pt also talking less than usual. Per chart review, pt has a habit of taking too much medication. She is on klonipin and ambien at home, also on opioids.     On evaluation, pt arousable, but somnolent. Denies any complaints. Spoke w/ covering nurse who notified provider she received pt like this. Protecting airway.     Unable to obtain any further hx from pt or . Attempted to call daughter, but unable to reach. Unable to confirm current meds.     ED course:  EMS gave narcan x1. In the ED, pt only responsive to pain. Given another narcan and became more arousable. Pt also w/ fever and tachycardia. C/f sepsis, given 2L bolus and started on broad spectrum abx. Admitted to medicine for further management (17 May 2023 02:07)      PAST MEDICAL & SURGICAL HISTORY:  Hypertension  Hyperlipidemia  CAD (coronary artery disease)  Migraine  Anxiety  Emphysema, unspecified  HTN (hypertension)  Anxiety  Coronary artery disease  Stented coronary artery  Seizure  Alcohol abuse  Migraine  Pain of right hip joint  MRSA bacteremia  Essential hypertension  CAD (coronary artery disease)  Elevated brain natriuretic peptide (BNP) level  S/P bladder repair  Status post total hip replacement, right  5/21/18  History of arthroplasty of right hip          FAMILY HISTORY:  Family history of coronary artery disease in daughter (Child)  Family history of essential hypertension        SOCIAL HISTORY:  [x ] Denies Smoking, Alcohol, or Drug Use    Allergies  No Known Allergies    PAIN MEDICATIONS:  clonazePAM  Tablet 0.5 milliGRAM(s) Oral every 12 hours  haloperidol    Injectable 2 milliGRAM(s) IV Push every 8 hours PRN  melatonin 6 milliGRAM(s) Oral at bedtime  oxycodone    5 mG/acetaminophen 325 mG 2 Tablet(s) Oral every 4 hours PRN  zolpidem 5 milliGRAM(s) Oral at bedtime PRN    Heme:  rivaroxaban 15 milliGRAM(s) Oral two times a day with meals    Antibiotics:  DAPTOmycin IVPB 300 milliGRAM(s) IV Intermittent every 24 hours    Cardiovascular:  NIFEdipine XL 60 milliGRAM(s) Oral daily    GI:    Endocrine:    All Other Medications:  bacitracin   Ointment 1 Application(s) Topical two times a day  nicotine -  14 mG/24Hr(s) Patch 1 Patch Transdermal daily      Vital Signs Last 24 Hrs  T(C): 36.8 (23 May 2023 13:00), Max: 36.8 (22 May 2023 17:17)  T(F): 98.2 (23 May 2023 13:00), Max: 98.3 (22 May 2023 17:17)  HR: 64 (23 May 2023 13:00) (64 - 70)  BP: 148/60 (23 May 2023 13:00) (144/70 - 160/75)  BP(mean): --  RR: 18 (23 May 2023 13:00) (17 - 18)  SpO2: 98% (23 May 2023 13:00) (96% - 100%)    Parameters below as of 23 May 2023 13:00  Patient On (Oxygen Delivery Method): room air        PAIN SCORE:    10/10    SCALE USED: (1-10 VNRS)           LABS:                          9.3    4.81  )-----------( 259      ( 23 May 2023 06:48 )             29.5     05-23    137  |  100  |  18  ----------------------------<  98  3.8   |  25  |  0.63    Ca    8.6      23 May 2023 06:48  Phos  3.8     05-23  Mg     2.10     05-23    TPro  7.6  /  Alb  3.1<L>  /  TBili  0.2  /  DBili  x   /  AST  16  /  ALT  12  /  AlkPhos  60  05-22    PTT - ( 22 May 2023 17:54 )  PTT:146.2 sec          [ x]  [place]  Reviewed. Reference#[id number]      Summary:  Patient seen at bedside with complaints of 10/10 right hip and LE pain. States the pain has been present for several months with OM infection. Describes the pain as sharp, stinging, stabbing radiating down to her foot. She is expressing concern that she will not be able to ambulate in the future. Patient states she wants to stay on current regimen of Percocet 10mg Q4H PRN. While at [facility], patient has been receiving Percocet Q4H, but it is unclear how often patient is receiving medication and ISTOP does not show consistent frequency on orders. Patient is vague with dose and how frequently she is taking Percocet. Also endorsing use of Gabapentin possibly BID but also unsure of dose. States she does not like using muscle relaxants. Reports taking Klonopin 0.5mg BID for anxiety. Denies smoking, alcohol and illicit drug use.     PHYSICAL EXAM:  GENERAL: Alert & Oriented x 3 in NAD. Sitting on edge of bed. Maintains eye contact, answers questions appropriately.     Recommendations:  -  Consider continuing current orders for Percocet. Hold for sedation. Not to be given within 1 hour of any immediate acting opioid.  -  Consider ordering Gabapentin 300mg Q8H. Hold for sedation.   -  Recommend maintaining continuous pulse oximetry.  -  Recommend follow up with Chronic Pain doctor when discharged. If patient does not have a Chronic Pain doctor, may acquire one through patient's personal insurance carrier.    Will notify Chronic Pain attending on call, Dr. Bernal. Awaiting call back.   No further recommendations at this time, Chronic pain service to sign off. May call Chronic Pain Service if needed.   Thank you.       Chief Complaint: right hip and LE pain    HPI:  76 Y/O F PMH Alcohol abuse, Anxiety, CAD (coronary artery disease), Emphysema, HLD, HTN, Migraine, MRSA Bacteremia, Seizure disorder was brought in for change in mental status. Hx obtained from chart review; limited hx from  from phone as  w/ hx of CVA    Pt was recently admitted to [facility] for R hip pain; has had multiple hip surgeries in the past. During the admission, c/f recurrence of MRSA R hip chronic OM based on R hip aspiration. Pt was managed with daptomycin to complete at rehab until 5/14. Pt was discharged to home recently but pt unable to walk or perform any ADLs. Pt also talking less than usual. Per chart review, pt has a habit of taking too much medication. She is on klonipin and ambien at home, also on opioids.     On evaluation, pt arousable, but somnolent. Denies any complaints. Spoke w/ covering nurse who notified provider she received pt like this. Protecting airway.     Unable to obtain any further hx from pt or . Attempted to call daughter, but unable to reach. Unable to confirm current meds.     ED course:  EMS gave narcan x1. In the ED, pt only responsive to pain. Given another narcan and became more arousable. Pt also w/ fever and tachycardia. C/f sepsis, given 2L bolus and started on broad spectrum abx. Admitted to medicine for further management (17 May 2023 02:07)      PAST MEDICAL & SURGICAL HISTORY:  Hypertension  Hyperlipidemia  CAD (coronary artery disease)  Migraine  Anxiety  Emphysema, unspecified  HTN (hypertension)  Anxiety  Coronary artery disease  Stented coronary artery  Seizure  Alcohol abuse  Migraine  Pain of right hip joint  MRSA bacteremia  Essential hypertension  CAD (coronary artery disease)  Elevated brain natriuretic peptide (BNP) level  S/P bladder repair  Status post total hip replacement, right  5/21/18  History of arthroplasty of right hip          FAMILY HISTORY:  Family history of coronary artery disease in daughter (Child)  Family history of essential hypertension        SOCIAL HISTORY:  [x ] Denies Smoking, Alcohol, or Drug Use    Allergies  No Known Allergies    PAIN MEDICATIONS:  clonazePAM  Tablet 0.5 milliGRAM(s) Oral every 12 hours  haloperidol    Injectable 2 milliGRAM(s) IV Push every 8 hours PRN  melatonin 6 milliGRAM(s) Oral at bedtime  oxycodone    5 mG/acetaminophen 325 mG 2 Tablet(s) Oral every 4 hours PRN  zolpidem 5 milliGRAM(s) Oral at bedtime PRN    Heme:  rivaroxaban 15 milliGRAM(s) Oral two times a day with meals    Antibiotics:  DAPTOmycin IVPB 300 milliGRAM(s) IV Intermittent every 24 hours    Cardiovascular:  NIFEdipine XL 60 milliGRAM(s) Oral daily    GI:    Endocrine:    All Other Medications:  bacitracin   Ointment 1 Application(s) Topical two times a day  nicotine -  14 mG/24Hr(s) Patch 1 Patch Transdermal daily      Vital Signs Last 24 Hrs  T(C): 36.8 (23 May 2023 13:00), Max: 36.8 (22 May 2023 17:17)  T(F): 98.2 (23 May 2023 13:00), Max: 98.3 (22 May 2023 17:17)  HR: 64 (23 May 2023 13:00) (64 - 70)  BP: 148/60 (23 May 2023 13:00) (144/70 - 160/75)  BP(mean): --  RR: 18 (23 May 2023 13:00) (17 - 18)  SpO2: 98% (23 May 2023 13:00) (96% - 100%)    Parameters below as of 23 May 2023 13:00  Patient On (Oxygen Delivery Method): room air        PAIN SCORE:    10/10    SCALE USED: (1-10 VNRS)           LABS:                          9.3    4.81  )-----------( 259      ( 23 May 2023 06:48 )             29.5     05-23    137  |  100  |  18  ----------------------------<  98  3.8   |  25  |  0.63    Ca    8.6      23 May 2023 06:48  Phos  3.8     05-23  Mg     2.10     05-23    TPro  7.6  /  Alb  3.1<L>  /  TBili  0.2  /  DBili  x   /  AST  16  /  ALT  12  /  AlkPhos  60  05-22    PTT - ( 22 May 2023 17:54 )  PTT:146.2 sec          [ x]  [place] Reference#[id number]      Summary:  Patient seen at bedside with complaints of 10/10 right hip and LE pain. States the pain has been present for several months with OM infection. Describes the pain as sharp, stinging, stabbing radiating down to her foot. She is expressing concern that she will not be able to ambulate in the future. Patient states she wants to stay on current regimen of Percocet 10mg Q4H PRN. While at [facility], patient has been receiving Percocet Q4H, but it is unclear how often patient is receiving medication and ISTOP does not show consistent frequency on orders. Patient is vague with dose and how frequently she is taking Percocet. Also endorsing use of Gabapentin possibly BID but also unsure of dose. States she does not like using muscle relaxants. Reports taking Klonopin 0.5mg BID for anxiety. Denies smoking, alcohol and illicit drug use.     PHYSICAL EXAM:  GENERAL: Alert & Oriented x 3 in NAD. Sitting on edge of bed. Maintains eye contact, answers questions appropriately.     Recommendations:  -  Consider continuing current orders for Percocet. Hold for sedation. Not to be given within 1 hour of any immediate acting opioid.  -  Consider ordering Gabapentin 300mg Q8H. Hold for sedation.   -  Recommend maintaining continuous pulse oximetry.  -  Recommend follow up with Chronic Pain doctor when discharged. If patient does not have a Chronic Pain doctor, may acquire one through patient's personal insurance carrier.    Discussed patient with Chronic Pain attending on call, Dr. Bernal, who agrees with the above recommendations.  No further recommendations at this time, Chronic pain service to sign off. May call Chronic Pain Service if needed.   Thank you.

## 2023-05-23 NOTE — CHART NOTE - NSCHARTNOTEFT_GEN_A_CORE
Contacted by daughter Brittni about patient's prior cultures at [facility]:  Staphylococcus aureus,    Resistant to:  cefazolin  oxacillin    Sensitive to:  Ceftaroline  Clindamycin  Daptomycin  Azithromycin  Linezolid  Rifampin  tetracyclines  Bactrim  Vancomycin    Will attempt to fax results

## 2023-05-23 NOTE — PROGRESS NOTE ADULT - PROBLEM SELECTOR PLAN 8
-Pt w/ hx of anxiety, takes clonazepam 0.5 q8 and zolpidem 5mg qhs, remeron  -s/p CIWA bundle  > c/w home clonazepam 0.5 TID -Pt w/ hx of anxiety, takes clonazepam 0.5 q8 and zolpidem 5mg qhs, remeron  -s/p CIWA bundle  > c/w home clonazepam 0.5 TID  - haldol 2mg IV q8h prn agitation as patient trying to walk and removed IV lines

## 2023-05-23 NOTE — PROVIDER CONTACT NOTE (OTHER) - ACTION/TREATMENT ORDERED:
Pt ordered for a stat aptt, MD made aware Pt  refusing. MD going to talk with patients family, plan pending

## 2023-05-23 NOTE — PROGRESS NOTE ADULT - PROBLEM SELECTOR PLAN 1
-On admission, pt w/ fever to 102 and tachycardia  -Exam c/f RLE cellulitis. Also hx of R hip OM 2/2 MRSA on prior admission   -s/p broad spectrum abx w/ cefepime and vanco (5/16-5/17)  -BCx, UCx, CXR wnl  -CT right leg w/o overt signs of OM, but progressive cellulitis  > f/u ID recs     > clinically dx of OM (regardless of CT) of right prosthetic hip joint     > no utility in abx d/t failed long term abx previously     > rec surgical intervention     > c/w Dapto (5/17 - )     > obtain culture sensitivities from [facility] (sent to Dr. Alyson Mota during last admission)   - ortho noted that prosthesis will need to be removed and patient will be unable to walk, unclear if intervention will necessarily control infection  > Per discussion w/ ID & ortho, pt will either need to consent for prosthetic hip repair w/ ortho or be palliative w/ lifelong abx     > family decision regarding surgery is ongoing -On admission, pt w/ fever to 102 and tachycardia  -Exam c/f RLE cellulitis. Also hx of R hip OM 2/2 MRSA on prior admission   -s/p broad spectrum abx w/ cefepime and vanco (5/16-5/17)  -BCx, UCx, CXR wnl  -CT right leg w/o overt signs of OM, but progressive cellulitis  > f/u ID recs     > clinically dx of OM (regardless of CT) of right prosthetic hip joint     > no utility in abx d/t failed long term abx previously     > rec surgical intervention     > c/w Dapto (5/17 - )     > obtain culture sensitivities from [facility] (sent to Dr. Alyson Mota during last admission)   - ortho noted that prosthesis will need to be removed and patient will be unable to walk, unclear if intervention will necessarily control infection  > Per discussion w/ ID & ortho, pt will either need to consent for prosthetic hip repair w/ ortho or be palliative w/ lifelong abx     > family decision for no operation

## 2023-05-24 PROCEDURE — 99233 SBSQ HOSP IP/OBS HIGH 50: CPT

## 2023-05-24 RX ORDER — GABAPENTIN 400 MG/1
100 CAPSULE ORAL EVERY 8 HOURS
Refills: 0 | Status: DISCONTINUED | OUTPATIENT
Start: 2023-05-24 | End: 2023-05-31

## 2023-05-24 RX ORDER — CHLORHEXIDINE GLUCONATE 213 G/1000ML
1 SOLUTION TOPICAL DAILY
Refills: 0 | Status: DISCONTINUED | OUTPATIENT
Start: 2023-05-24 | End: 2023-05-31

## 2023-05-24 RX ADMIN — CHLORHEXIDINE GLUCONATE 1 APPLICATION(S): 213 SOLUTION TOPICAL at 18:39

## 2023-05-24 RX ADMIN — Medication 60 MILLIGRAM(S): at 06:02

## 2023-05-24 RX ADMIN — RIVAROXABAN 15 MILLIGRAM(S): KIT at 09:37

## 2023-05-24 RX ADMIN — GABAPENTIN 100 MILLIGRAM(S): 400 CAPSULE ORAL at 14:30

## 2023-05-24 RX ADMIN — ZOLPIDEM TARTRATE 5 MILLIGRAM(S): 10 TABLET ORAL at 22:10

## 2023-05-24 RX ADMIN — RIVAROXABAN 15 MILLIGRAM(S): KIT at 18:39

## 2023-05-24 RX ADMIN — Medication 0.5 MILLIGRAM(S): at 06:02

## 2023-05-24 RX ADMIN — Medication 100 MILLIGRAM(S): at 06:01

## 2023-05-24 RX ADMIN — GABAPENTIN 100 MILLIGRAM(S): 400 CAPSULE ORAL at 20:37

## 2023-05-24 RX ADMIN — Medication 100 MILLIGRAM(S): at 20:38

## 2023-05-24 RX ADMIN — Medication 1 APPLICATION(S): at 06:02

## 2023-05-24 RX ADMIN — Medication 0.5 MILLIGRAM(S): at 20:38

## 2023-05-24 NOTE — PROGRESS NOTE ADULT - PROBLEM SELECTOR PLAN 1
-On admission, pt w/ fever to 102 and tachycardia  -Exam c/f RLE cellulitis. Also hx of R hip OM 2/2 MRSA on prior admission   -s/p broad spectrum abx w/ cefepime and vanco (5/16-5/17)  -BCx, UCx, CXR wnl  -CT right leg w/o overt signs of OM, but progressive cellulitis  > f/u ID recs     > clinically dx of OM (regardless of CT) of right prosthetic hip joint     > no utility in abx d/t failed long term abx previously     > rec surgical intervention     > c/w Dapto (5/17 - )     > obtain culture sensitivities from [facility] (sent to Dr. Alyson Mota during last admission)   - ortho noted that prosthesis will need to be removed and patient will be unable to walk, unclear if intervention will necessarily control infection  > Per discussion w/ ID & ortho, pt will either need to consent for prosthetic hip repair w/ ortho or be palliative w/ lifelong abx     > family decision for no operation -On admission, pt w/ fever to 102 and tachycardia, sepsis now resolved.  Sepsis 2/2 R hip OM and overlying cellulitis   -s/p broad spectrum abx w/ cefepime and vanco (5/16-5/17), will now require indefinite suppressive therapy with PO doxy 100mg BID   -BCx, UCx, CXR wnl  -Prior team able to get culture data from [facility], documented in note from 5/23   -Pt and family declining operative intervention and prefers palliative longterm abx therapy and rehab

## 2023-05-24 NOTE — PROGRESS NOTE ADULT - PROBLEM SELECTOR PLAN 4
-RESOLVED  -Arousable, but somnolent on presentation  -Diff: C/f septic encephalopathy vs drug induced encephalopathy vs ETOH withdrawal  -S/p narcan x2 with some improvement  -Pt also on benzo and opioids as seen on utox  -Pt on Lomotil based on I-STOP  -Atropine overdose to consider given hyperthermia, tachycardia, dry MM, and urinary retention; however, difficult to differentiate from sepsis  -per ON tox fellow overnight, primary presentation likely not 2/2 drug-induced encephalopathy, but likely contributing. Advised against treating for atropine overdose. Also advised to hold further narcan as pt RR stable and pt not retaining  on blood gas  -A&O x 3 in AM   -CTH w/o abnormalities  > currently A&O x 3  > infectious work-up as above  > fall precautions, seizure precaution, aspiration precaution  > will h/o sedative medications -Pt w/ hx of diastolic HF w/ mild pulm HTN, on lasix 40mg at home, has been on hold in setting of sepsis  -Echo (4/22): EF 75% w/ stage 2 diastolic function  Currently patient without decompensation, can likely resume Lasix on discharge

## 2023-05-24 NOTE — PROGRESS NOTE ADULT - PROBLEM SELECTOR PLAN 5
-Pt w/ hx of diastolic HF w/ mild pulm HTN, on lasix 40mg  -Will hold at this time given sepsis  -Echo (4/22): EF 75% w/ stage 2 diastolic function  > h/o home furosemide -Pt w/ hx of HTN, on home losartan, nifedipine, and hydralazine.  > on home nifedipine 60 qday

## 2023-05-24 NOTE — PROGRESS NOTE ADULT - PROBLEM SELECTOR PLAN 9
-Pt w/ normocytic anemia hgb 10; appears chronic and stable.  > trend daily DVT: xarelto  Diet: regular diet  Dispo: [facility] aware   Code Status: full code  Care discussed with daughter Brittni

## 2023-05-24 NOTE — PROGRESS NOTE ADULT - PROBLEM SELECTOR PLAN 7
-Diagnosed w/ PAD during last admission  -ANDREA/PVR:  Moderate bilateral lower extremity arterial flow limitation localizing to the popliteal and infrapopliteal levels  -s/p angio; no plan for surgical intervention per vascular during last admission  > on ASA and atorvastatin 40 mg  > can hold home meds given mental status -Pt w/ hx of anxiety, takes clonazepam 0.5 q8 and zolpidem 5mg qhs, remeron  -s/p CIWA bundle  > c/w home clonazepam 0.5 TID  - haldol 2mg IV q8h prn agitation as patient trying to walk and removed IV lines

## 2023-05-24 NOTE — PROGRESS NOTE ADULT - PROBLEM SELECTOR PLAN 3
-(+) right venous thrombus DVT (5/18)  > switched from heparin to xarelto as patient not opting for operation, also refusing PTT

## 2023-05-24 NOTE — PROGRESS NOTE ADULT - ASSESSMENT
77F PMH Alcohol use disorder, Anxiety, CAD (coronary artery disease), Emphysema, HLD, HTN, Migraine, hx of MRSA Bacteremia, Seizure disorder, 2018 R [facility] c/b MRSA osteomyelitis admitted for sepsis likely 2/2 R hip osteomyelitis, transitioned to lifelong PO doxy for suppressive therapy. Prior medical team had extensive discussion with ortho and patient - decided against surgical intervention and removal of prosthesis and pt to continue palliative lifelong abx.  Pt and daughter ultimately decided against surgery since it would remove her ability to walk and instead to opt for antibiotics and rehab.

## 2023-05-24 NOTE — PROGRESS NOTE ADULT - PROBLEM SELECTOR PROBLEM 10
Anemia
Preventive measure
Anemia
Preventive measure
Preventive measure

## 2023-05-24 NOTE — SBIRT NOTE ADULT - NSSBIRTALCPOSREINDET_GEN_A_CORE
Pt denies having had alcohol in the last year. Pt reports that she would drink on social occasions at parties but reports that she has not been to a party in the past year as she has been in and out of hospitals. SW provided support.

## 2023-05-24 NOTE — PROGRESS NOTE ADULT - PROBLEM SELECTOR PLAN 6
-Pt w/ hx of HTN, on home losartan, nifedipine, and hydralazine.  > on home nifedipine 60 qday -Diagnosed w/ PAD during last admission  -ANDREA/PVR:  Moderate bilateral lower extremity arterial flow limitation localizing to the popliteal and infrapopliteal levels  -s/p angio; no plan for surgical intervention per vascular during last admission  > on Xarelto now, no report of statin therapy on med rec, lipid profile reviewed, will consider initiating statin

## 2023-05-24 NOTE — PROGRESS NOTE ADULT - SUBJECTIVE AND OBJECTIVE BOX
[facility] Division of Hospital Medicine  Rambo Bermudez DO  Available via MS Teams  In house pager [phone]     SUBJECTIVE / OVERNIGHT EVENTS:    ADDITIONAL REVIEW OF SYSTEMS:    MEDICATIONS  (STANDING):  albuterol/ipratropium for Nebulization 3 milliLiter(s) Nebulizer every 6 hours  bacitracin   Ointment 1 Application(s) Topical two times a day  clonazePAM  Tablet 0.5 milliGRAM(s) Oral every 12 hours  doxycycline monohydrate Capsule 100 milliGRAM(s) Oral every 12 hours  melatonin 6 milliGRAM(s) Oral at bedtime  nicotine -  14 mG/24Hr(s) Patch 1 Patch Transdermal daily  NIFEdipine XL 60 milliGRAM(s) Oral daily  rivaroxaban 15 milliGRAM(s) Oral two times a day with meals    MEDICATIONS  (PRN):  haloperidol    Injectable 2 milliGRAM(s) IV Push every 8 hours PRN agitation  oxycodone    5 mG/acetaminophen 325 mG 2 Tablet(s) Oral every 4 hours PRN Severe Pain (7 - 10)  zolpidem 5 milliGRAM(s) Oral at bedtime PRN Insomnia      I&O's Summary    23 May 2023 07:01  -  24 May 2023 07:00  --------------------------------------------------------  IN: 237 mL / OUT: 450 mL / NET: -213 mL        PHYSICAL EXAM:  Vital Signs Last 24 Hrs  T(C): 36.6 (24 May 2023 12:00), Max: 36.8 (23 May 2023 13:00)  T(F): 97.8 (24 May 2023 12:00), Max: 98.2 (23 May 2023 13:00)  HR: 67 (24 May 2023 12:00) (64 - 67)  BP: 169/73 (24 May 2023 04:44) (148/60 - 169/73)  BP(mean): --  RR: 18 (24 May 2023 12:00) (18 - 18)  SpO2: 95% (24 May 2023 12:00) (93% - 98%)    Parameters below as of 24 May 2023 12:00  Patient On (Oxygen Delivery Method): room air      CONSTITUTIONAL: NAD, well-developed, well-groomed  EYES: PERRLA; conjunctiva and sclera clear  ENMT: Moist oral mucosa, no pharyngeal injection or exudates; normal dentition  NECK: Supple, no palpable masses; no thyromegaly  RESPIRATORY: Normal respiratory effort; lungs are clear to auscultation bilaterally  CARDIOVASCULAR: Regular rate and rhythm, normal S1 and S2, no murmur/rub/gallop; No lower extremity edema; Peripheral pulses are 2+ bilaterally  ABDOMEN: Nontender to palpation, normoactive bowel sounds, no rebound/guarding; No hepatosplenomegaly  MUSCULOSKELETAL:  Normal gait; no clubbing or cyanosis of digits; no joint swelling or tenderness to palpation  PSYCH: A+O to person, place, and time; affect appropriate  NEUROLOGY: CN 2-12 are intact and symmetric; no gross sensory deficits   SKIN: No rashes; no palpable lesions    LABS:                        9.3    4.81  )-----------( 259      ( 23 May 2023 06:48 )             29.5     05-23    137  |  100  |  18  ----------------------------<  98  3.8   |  25  |  0.63    Ca    8.6      23 May 2023 06:48  Phos  3.8     05-23  Mg     2.10     05-23      PTT - ( 22 May 2023 17:54 )  PTT:146.2 sec          SARS-CoV-2: NotDetec (17 May 2023 03:01)  COVID-19 PCR: NotDetec (25 Apr 2023 06:05)  COVID-19 PCR: NotDetec (22 Apr 2023 07:05)  COVID-19 PCR: NotDetec (18 Apr 2023 13:07)  SARS-CoV-2: NotDetec (11 Apr 2023 21:08)      RADIOLOGY & ADDITIONAL TESTS:  New Results Reviewed Today:   New Imaging Personally Reviewed Today:  New Electrocardiogram Personally Reviewed Today:  Prior or Outpatient Records Reviewed Today:    COMMUNICATION:  Care Discussed with Consultants/Other Providers and Details of Discussion:  Discussions with Patient/Family:  PCP Communication:     [facility] Division of Hospital Medicine  Rambo Bermudez DO  Available via MS Teams  In house pager [phone]     SUBJECTIVE / OVERNIGHT EVENTS: Pt seen earlier this AM, pt feels very anxious, feels like she's at the end of her life but is making the decision to forego surgery because she wants to continue to ambulate.  Still has severe pain in her R hip, explained to her that pain service is recommending to start Gabapentin for neuropathic type pains in RLE.      MEDICATIONS  (STANDING):  albuterol/ipratropium for Nebulization 3 milliLiter(s) Nebulizer every 6 hours  bacitracin   Ointment 1 Application(s) Topical two times a day  clonazePAM  Tablet 0.5 milliGRAM(s) Oral every 12 hours  doxycycline monohydrate Capsule 100 milliGRAM(s) Oral every 12 hours  melatonin 6 milliGRAM(s) Oral at bedtime  nicotine -  14 mG/24Hr(s) Patch 1 Patch Transdermal daily  NIFEdipine XL 60 milliGRAM(s) Oral daily  rivaroxaban 15 milliGRAM(s) Oral two times a day with meals    MEDICATIONS  (PRN):  haloperidol    Injectable 2 milliGRAM(s) IV Push every 8 hours PRN agitation  oxycodone    5 mG/acetaminophen 325 mG 2 Tablet(s) Oral every 4 hours PRN Severe Pain (7 - 10)  zolpidem 5 milliGRAM(s) Oral at bedtime PRN Insomnia      I&O's Summary    23 May 2023 07:01  -  24 May 2023 07:00  --------------------------------------------------------  IN: 237 mL / OUT: 450 mL / NET: -213 mL        PHYSICAL EXAM:  Vital Signs Last 24 Hrs  T(C): 36.6 (24 May 2023 12:00), Max: 36.8 (23 May 2023 13:00)  T(F): 97.8 (24 May 2023 12:00), Max: 98.2 (23 May 2023 13:00)  HR: 67 (24 May 2023 12:00) (64 - 67)  BP: 169/73 (24 May 2023 04:44) (148/60 - 169/73)  BP(mean): --  RR: 18 (24 May 2023 12:00) (18 - 18)  SpO2: 95% (24 May 2023 12:00) (93% - 98%)    Parameters below as of 24 May 2023 12:00  Patient On (Oxygen Delivery Method): room air    CONSTITUTIONAL: Anxious, restless   EYES: PERRLA; conjunctiva and sclera clear  RESPIRATORY: Normal respiratory effort; lungs are clear to auscultation bilaterally  CARDIOVASCULAR: Regular rate and rhythm, normal S1 and S2, no murmur/rub/gallop; No lower extremity edema  ABDOMEN: Nontender to palpation, normoactive bowel sounds, no rebound/guarding  MUSCULOSKELETAL:  No clubbing or cyanosis of digits; +TTP in R hip, decrease ROM in R hip   PSYCH: A+O to person, place, and time; affect appropriate  NEUROLOGY: CN 2-12 are intact and symmetric; no gross sensory deficits   SKIN: R lateral malleolus with nonpurulent wound     LABS:                        9.3    4.81  )-----------( 259      ( 23 May 2023 06:48 )             29.5     05-23    137  |  100  |  18  ----------------------------<  98  3.8   |  25  |  0.63    Ca    8.6      23 May 2023 06:48  Phos  3.8     05-23  Mg     2.10     05-23      PTT - ( 22 May 2023 17:54 )  PTT:146.2 sec    SARS-CoV-2: NotDetec (17 May 2023 03:01)  COVID-19 PCR: NotDetec (25 Apr 2023 06:05)  COVID-19 PCR: NotDetec (22 Apr 2023 07:05)  COVID-19 PCR: NotDetec (18 Apr 2023 13:07)  SARS-CoV-2: NotDetec (11 Apr 2023 21:08)      RADIOLOGY & ADDITIONAL TESTS:  Prior or Outpatient Records Reviewed Today:  < from: CT Lower Extremity w/ IV Cont, Right (05.19.23 @ 12:23) >    IMPRESSION:    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 There is unchanged edema in the region of the quadriceps   muscles extending from the level the hip to the level of the distal femur   with surrounding edema in the myofascial planes and subcutaneous fat.   This is suggestive of cellulitis. No drainable fluid collection. No   subcutaneous air. Technetium sulfur colloid marrow scan or indium labeled   White blood cell study would better assess for osteomyelitis if   clinically indicated.    < end of copied text >    COMMUNICATION:  Discussions with Patient/Family: Daughter Brittni

## 2023-05-24 NOTE — PROGRESS NOTE ADULT - PROBLEM SELECTOR PLAN 8
-Pt w/ hx of anxiety, takes clonazepam 0.5 q8 and zolpidem 5mg qhs, remeron  -s/p CIWA bundle  > c/w home clonazepam 0.5 TID  - haldol 2mg IV q8h prn agitation as patient trying to walk and removed IV lines -Pt w/ normocytic anemia hgb 10; appears chronic and stable.  > trend daily

## 2023-05-24 NOTE — PROGRESS NOTE ADULT - PROBLEM SELECTOR PLAN 2
-Pt w/ right hip pain chronic i/s/o chronic OM vs acute OM   -On lidocaine 4%, percocet based on I-STOP  -CT right leg w/o overt signs of OM, but progressive cellulitis   > pain regimen percocet 5/325 x 2 tab (q4 PRN) per home meds -Pt w/ right hip pain chronic i/s/o chronic OM vs acute OM   -On lidocaine 4%, percocet based on I-STOP  > pain regimen percocet 5/325 x 2 tab (q4 PRN) per home meds   - Pain service consult reviewed, consider adding Gabapentin 300mg Q8H

## 2023-05-25 LAB
MRSA PCR RESULT.: SIGNIFICANT CHANGE UP
S AUREUS DNA NOSE QL NAA+PROBE: SIGNIFICANT CHANGE UP
SARS-COV-2 RNA SPEC QL NAA+PROBE: SIGNIFICANT CHANGE UP

## 2023-05-25 PROCEDURE — 99232 SBSQ HOSP IP/OBS MODERATE 35: CPT

## 2023-05-25 RX ORDER — ZOLPIDEM TARTRATE 10 MG/1
5 TABLET ORAL AT BEDTIME
Refills: 0 | Status: DISCONTINUED | OUTPATIENT
Start: 2023-05-25 | End: 2023-05-31

## 2023-05-25 RX ORDER — HALOPERIDOL DECANOATE 100 MG/ML
2 INJECTION INTRAMUSCULAR EVERY 8 HOURS
Refills: 0 | Status: DISCONTINUED | OUTPATIENT
Start: 2023-05-25 | End: 2023-05-31

## 2023-05-25 RX ORDER — CLONAZEPAM 1 MG
0.5 TABLET ORAL EVERY 12 HOURS
Refills: 0 | Status: DISCONTINUED | OUTPATIENT
Start: 2023-05-25 | End: 2023-05-31

## 2023-05-25 RX ADMIN — GABAPENTIN 100 MILLIGRAM(S): 400 CAPSULE ORAL at 05:13

## 2023-05-25 RX ADMIN — RIVAROXABAN 15 MILLIGRAM(S): KIT at 08:06

## 2023-05-25 RX ADMIN — Medication 100 MILLIGRAM(S): at 17:06

## 2023-05-25 RX ADMIN — Medication 1 APPLICATION(S): at 17:06

## 2023-05-25 RX ADMIN — RIVAROXABAN 15 MILLIGRAM(S): KIT at 18:05

## 2023-05-25 RX ADMIN — CHLORHEXIDINE GLUCONATE 1 APPLICATION(S): 213 SOLUTION TOPICAL at 12:01

## 2023-05-25 RX ADMIN — GABAPENTIN 100 MILLIGRAM(S): 400 CAPSULE ORAL at 14:13

## 2023-05-25 RX ADMIN — Medication 100 MILLIGRAM(S): at 05:14

## 2023-05-25 RX ADMIN — Medication 6 MILLIGRAM(S): at 21:11

## 2023-05-25 RX ADMIN — Medication 60 MILLIGRAM(S): at 05:14

## 2023-05-25 RX ADMIN — ZOLPIDEM TARTRATE 5 MILLIGRAM(S): 10 TABLET ORAL at 21:10

## 2023-05-25 RX ADMIN — Medication 0.5 MILLIGRAM(S): at 17:05

## 2023-05-25 RX ADMIN — GABAPENTIN 100 MILLIGRAM(S): 400 CAPSULE ORAL at 21:11

## 2023-05-25 RX ADMIN — Medication 0.5 MILLIGRAM(S): at 05:13

## 2023-05-25 NOTE — PROGRESS NOTE ADULT - PROBLEM SELECTOR PLAN 6
-Diagnosed w/ PAD during last admission  -ANDREA/PVR:  Moderate bilateral lower extremity arterial flow limitation localizing to the popliteal and infrapopliteal levels  -s/p angio; no plan for surgical intervention per vascular during last admission  > on Xarelto now, no report of statin therapy on med rec, lipid profile reviewed LDL 57

## 2023-05-25 NOTE — PROGRESS NOTE ADULT - PROBLEM SELECTOR PLAN 1
-On admission, pt w/ fever to 102 and tachycardia, sepsis now resolved.  Sepsis 2/2 R hip OM and overlying cellulitis   -s/p broad spectrum abx w/ cefepime and vanco (5/16-5/17), will now require indefinite suppressive therapy with PO doxy 100mg BID   -BCx, UCx, CXR wnl  -Prior team able to get culture data from [facility], documented in note from 5/23   -Pt and family declining operative intervention and prefers palliative longterm abx therapy and rehab

## 2023-05-25 NOTE — PROGRESS NOTE ADULT - PROBLEM SELECTOR PLAN 7
-Pt w/ hx of anxiety, takes clonazepam 0.5 q8 and zolpidem 5mg qhs, remeron  -s/p CIWA bundle  > c/w home clonazepam 0.5 TID  - haldol 2mg IV q8h prn agitation as patient trying to walk and removed IV lines

## 2023-05-25 NOTE — PROGRESS NOTE ADULT - PROBLEM SELECTOR PLAN 9
DVT: xarelto  Diet: regular diet  Dispo: [facility] aware and in communication with daughter for placement   Code Status: full code  Care discussed with daughter Brittni

## 2023-05-25 NOTE — PROGRESS NOTE ADULT - PROBLEM SELECTOR PLAN 2
-Pt w/ right hip pain chronic i/s/o chronic OM vs acute OM   -On lidocaine 4%, percocet based on I-STOP  > pain regimen percocet 5/325 x 2 tab (q4 PRN) per home meds   - Pain service consult reviewed, started Gabapentin 100mg q8 hrs 5/24

## 2023-05-25 NOTE — PROGRESS NOTE ADULT - PROBLEM SELECTOR PLAN 4
-Pt w/ hx of diastolic HF w/ mild pulm HTN, on lasix 40mg at home, has been on hold in setting of sepsis  -Echo (4/22): EF 75% w/ stage 2 diastolic function  Currently patient without decompensation

## 2023-05-25 NOTE — PROGRESS NOTE ADULT - SUBJECTIVE AND OBJECTIVE BOX
[facility] Division of Hospital Medicine  Rambo Bermudez DO  Available via MS Teams  In house pager [phone]     SUBJECTIVE / OVERNIGHT EVENTS: No overnight events, pt asking for a walker so she can ambulate.  Pain seems better controlled today with addition of Gabapentin.  Asking if she can be placed at [facility] for [facility].  frustrated that breakfast was delivered late this morning     MEDICATIONS  (STANDING):  albuterol/ipratropium for Nebulization 3 milliLiter(s) Nebulizer every 6 hours  bacitracin   Ointment 1 Application(s) Topical two times a day  chlorhexidine 2% Cloths 1 Application(s) Topical daily  clonazePAM  Tablet 0.5 milliGRAM(s) Oral every 12 hours  doxycycline monohydrate Capsule 100 milliGRAM(s) Oral every 12 hours  gabapentin 100 milliGRAM(s) Oral every 8 hours  melatonin 6 milliGRAM(s) Oral at bedtime  nicotine -  14 mG/24Hr(s) Patch 1 Patch Transdermal daily  NIFEdipine XL 60 milliGRAM(s) Oral daily  rivaroxaban 15 milliGRAM(s) Oral two times a day with meals    MEDICATIONS  (PRN):  haloperidol    Injectable 2 milliGRAM(s) IV Push every 8 hours PRN agitation  oxycodone    5 mG/acetaminophen 325 mG 2 Tablet(s) Oral every 4 hours PRN Severe Pain (7 - 10)  zolpidem 5 milliGRAM(s) Oral at bedtime PRN Insomnia      I&O's Summary    24 May 2023 07:01  -  25 May 2023 07:00  --------------------------------------------------------  IN: 780 mL / OUT: 600 mL / NET: 180 mL        PHYSICAL EXAM:  Vital Signs Last 24 Hrs  T(C): 37.2 (25 May 2023 05:03), Max: 37.2 (25 May 2023 05:03)  T(F): 98.9 (25 May 2023 05:03), Max: 98.9 (25 May 2023 05:03)  HR: 100 (25 May 2023 05:03) (72 - 100)  BP: 173/66 (25 May 2023 06:31) (150/60 - 199/80)  BP(mean): --  RR: 18 (25 May 2023 05:03) (18 - 18)  SpO2: 94% (25 May 2023 05:03) (94% - 95%)    Parameters below as of 24 May 2023 19:37  Patient On (Oxygen Delivery Method): room air      CONSTITUTIONAL: NAD, well-developed, well-groomed  EYES: PERRLA; conjunctiva and sclera clear  RESPIRATORY: Normal respiratory effort; lungs are clear to auscultation bilaterally  CARDIOVASCULAR: Regular rate and rhythm, normal S1 and S2, no murmur/rub/gallop; No lower extremity edema  MUSCULOSKELETAL:  +TTP in R hip, limited ROM  PSYCH: A+O to person, place, and time; affect appropriate  NEUROLOGY: CN 2-12 are intact and symmetric; no gross sensory deficits   SKIN: mild erythema overlying R hip, R lateral malleolus wound nonpurulent     LABS:  no new labs       SARS-CoV-2: NotDetec (17 May 2023 03:01)  COVID-19 PCR: NotDetec (25 Apr 2023 06:05)  COVID-19 PCR: NotDetec (22 Apr 2023 07:05)  COVID-19 PCR: NotDetec (18 Apr 2023 13:07)  SARS-CoV-2: NotDetec (11 Apr 2023 21:08)

## 2023-05-26 LAB
MRSA PCR RESULT.: SIGNIFICANT CHANGE UP
S AUREUS DNA NOSE QL NAA+PROBE: SIGNIFICANT CHANGE UP

## 2023-05-26 PROCEDURE — 99232 SBSQ HOSP IP/OBS MODERATE 35: CPT

## 2023-05-26 RX ADMIN — CHLORHEXIDINE GLUCONATE 1 APPLICATION(S): 213 SOLUTION TOPICAL at 11:32

## 2023-05-26 RX ADMIN — Medication 60 MILLIGRAM(S): at 06:59

## 2023-05-26 RX ADMIN — Medication 0.5 MILLIGRAM(S): at 18:06

## 2023-05-26 RX ADMIN — ZOLPIDEM TARTRATE 5 MILLIGRAM(S): 10 TABLET ORAL at 21:49

## 2023-05-26 RX ADMIN — Medication 1 APPLICATION(S): at 07:00

## 2023-05-26 RX ADMIN — Medication 100 MILLIGRAM(S): at 18:08

## 2023-05-26 RX ADMIN — Medication 6 MILLIGRAM(S): at 21:49

## 2023-05-26 RX ADMIN — RIVAROXABAN 15 MILLIGRAM(S): KIT at 18:07

## 2023-05-26 RX ADMIN — GABAPENTIN 100 MILLIGRAM(S): 400 CAPSULE ORAL at 21:49

## 2023-05-26 RX ADMIN — Medication 1 APPLICATION(S): at 18:11

## 2023-05-26 RX ADMIN — Medication 0.5 MILLIGRAM(S): at 09:17

## 2023-05-26 RX ADMIN — GABAPENTIN 100 MILLIGRAM(S): 400 CAPSULE ORAL at 06:59

## 2023-05-26 RX ADMIN — RIVAROXABAN 15 MILLIGRAM(S): KIT at 09:17

## 2023-05-26 RX ADMIN — Medication 100 MILLIGRAM(S): at 07:00

## 2023-05-26 NOTE — PROGRESS NOTE ADULT - PROBLEM SELECTOR PLAN 3
-(+) right venous thrombus DVT (5/18)  > switched from heparin to xarelto as patient not opting for operation, and wants to minimize lab work

## 2023-05-26 NOTE — PROGRESS NOTE ADULT - PROBLEM SELECTOR PLAN 1
-On admission, pt w/ fever to 102 and tachycardia, sepsis now resolved.  Sepsis 2/2 R hip OM and overlying cellulitis   -s/p broad spectrum abx w/ cefepime and vanco (5/16-5/17), will now require indefinite suppressive therapy with PO doxy 100mg BID   -BCx, UCx, CXR wnl  -Prior team able to get culture data from [facility], documented in note from 5/23   -Pt and family declining operative intervention and prefers palliative longterm abx therapy and rehab

## 2023-05-26 NOTE — PROGRESS NOTE ADULT - ASSESSMENT
77F w/ 77F w/ HTN, CAD, COPD, EtOH use disorder, prior intravenous drug use, opioid use disorder, h/o MRSA bacteremia, chronic R hip OM, seizure disorder admitted with encephalopathy due to possible opioid overdose, course c/b sepsis 2/2 RLE chronic OM – patient declined surgery in the past. ID following on currently receiving chronic doxycycline. Planning for discharge to [facility].

## 2023-05-26 NOTE — PROGRESS NOTE ADULT - PROBLEM SELECTOR PLAN 2
-Pt w/ right hip pain chronic i/s/o chronic OM vs acute OM   -On lidocaine 4%, percocet based on I-STOP  > pain regimen percocet 5/325 x 2 tab (q4 PRN) per home meds   - Pain service consult reviewed, started Gabapentin 100mg q8 hrs 5/24  - Abx as above

## 2023-05-26 NOTE — PROGRESS NOTE ADULT - SUBJECTIVE AND OBJECTIVE BOX
[facility] Division of Hospital Medicine  Kristin Huynh MD EdM  Pager [phone]  Also available on MS Teams    SUBJECTIVE / OVERNIGHT EVENTS:  No events overnight  Pain well controlled this morning. Feeling down about loss of function related to her chronic hip infection.    MEDICATIONS  (STANDING):  albuterol/ipratropium for Nebulization 3 milliLiter(s) Nebulizer every 6 hours  bacitracin   Ointment 1 Application(s) Topical two times a day  chlorhexidine 2% Cloths 1 Application(s) Topical daily  clonazePAM  Tablet 0.5 milliGRAM(s) Oral every 12 hours  doxycycline monohydrate Capsule 100 milliGRAM(s) Oral every 12 hours  gabapentin 100 milliGRAM(s) Oral every 8 hours  melatonin 6 milliGRAM(s) Oral at bedtime  nicotine -  14 mG/24Hr(s) Patch 1 Patch Transdermal daily  NIFEdipine XL 60 milliGRAM(s) Oral daily  rivaroxaban 15 milliGRAM(s) Oral two times a day with meals    MEDICATIONS  (PRN):  haloperidol    Injectable 2 milliGRAM(s) IV Push every 8 hours PRN agitation  oxycodone    5 mG/acetaminophen 325 mG 2 Tablet(s) Oral every 4 hours PRN Severe Pain (7 - 10)  zolpidem 5 milliGRAM(s) Oral at bedtime PRN Insomnia      I&O's Summary    25 May 2023 07:01  -  26 May 2023 07:00  --------------------------------------------------------  IN: 0 mL / OUT: 1400 mL / NET: -1400 mL        PHYSICAL EXAM:  Vital Signs Last 24 Hrs  T(C): 36.7 (26 May 2023 13:04), Max: 36.8 (25 May 2023 19:46)  T(F): 98 (26 May 2023 13:04), Max: 98.3 (25 May 2023 19:46)  HR: 70 (26 May 2023 13:04) (65 - 70)  BP: 148/69 (26 May 2023 13:04) (142/66 - 154/66)  BP(mean): --  RR: 18 (26 May 2023 13:04) (18 - 19)  SpO2: 98% (26 May 2023 13:04) (95% - 98%)    Parameters below as of 26 May 2023 13:04  Patient On (Oxygen Delivery Method): room air      CONSTITUTIONAL: no acute distress, conversant  EYES: conjunctiva and sclera clear  ENMT: Moist oral mucosa  RESPIRATORY: Normal respiratory effort; lungs are clear to auscultation bilaterally  CARDIOVASCULAR: Regular rate and rhythm, normal S1 and S2, no murmur/rub/gallop; No lower extremity edema  ABDOMEN: no tenderness to palpation, normoactive bowel sounds, no rebound/guarding  PSYCH: Alert; affect appropriate  NEUROLOGY: CN 2-12 are intact and symmetric; moving all extremities   SKIN: No rashes on examined skin    LABS:          COVID-19 PCR: NotDetec (25 May 2023 11:28)  SARS-CoV-2: NotDetec (17 May 2023 03:01)  COVID-19 PCR: NotDetec (25 Apr 2023 06:05)  COVID-19 PCR: NotDetec (22 Apr 2023 07:05)  COVID-19 PCR: NotDetec (18 Apr 2023 13:07)  SARS-CoV-2: NotDetec (11 Apr 2023 21:08)      COORDINATION OF CARE:  Consultant Communication and Details of Discussion (where applicable): Discussed with interdisciplinary team at [facility]

## 2023-05-26 NOTE — PROGRESS NOTE ADULT - PROBLEM SELECTOR PLAN 9
DVT: xarelto  Diet: regular diet  Dispo: [facility] aware and in communication with daughter for placement   Code Status: full code  Care discussed with daughter Brittni

## 2023-05-26 NOTE — PROGRESS NOTE ADULT - PROBLEM SELECTOR PLAN 4
-Pt w/ hx of diastolic HF w/ mild pulm HTN, on lasix 40mg at home, has been on hold in setting of sepsis  -Echo (4/22): EF 75% w/ stage 2 diastolic function  Currently patient euvolemic  Consider resuming lasix as sepsis is resolved

## 2023-05-27 PROCEDURE — 99232 SBSQ HOSP IP/OBS MODERATE 35: CPT

## 2023-05-27 RX ADMIN — GABAPENTIN 100 MILLIGRAM(S): 400 CAPSULE ORAL at 13:21

## 2023-05-27 RX ADMIN — ZOLPIDEM TARTRATE 5 MILLIGRAM(S): 10 TABLET ORAL at 21:17

## 2023-05-27 RX ADMIN — CHLORHEXIDINE GLUCONATE 1 APPLICATION(S): 213 SOLUTION TOPICAL at 11:14

## 2023-05-27 RX ADMIN — Medication 100 MILLIGRAM(S): at 17:45

## 2023-05-27 RX ADMIN — Medication 0.5 MILLIGRAM(S): at 09:32

## 2023-05-27 RX ADMIN — Medication 60 MILLIGRAM(S): at 06:55

## 2023-05-27 RX ADMIN — Medication 1 APPLICATION(S): at 06:55

## 2023-05-27 RX ADMIN — Medication 6 MILLIGRAM(S): at 22:33

## 2023-05-27 RX ADMIN — RIVAROXABAN 15 MILLIGRAM(S): KIT at 09:32

## 2023-05-27 RX ADMIN — Medication 1 APPLICATION(S): at 17:46

## 2023-05-27 RX ADMIN — Medication 0.5 MILLIGRAM(S): at 17:48

## 2023-05-27 RX ADMIN — Medication 100 MILLIGRAM(S): at 06:56

## 2023-05-27 RX ADMIN — GABAPENTIN 100 MILLIGRAM(S): 400 CAPSULE ORAL at 06:55

## 2023-05-27 RX ADMIN — RIVAROXABAN 15 MILLIGRAM(S): KIT at 17:45

## 2023-05-27 RX ADMIN — GABAPENTIN 100 MILLIGRAM(S): 400 CAPSULE ORAL at 21:10

## 2023-05-27 NOTE — PROGRESS NOTE ADULT - SUBJECTIVE AND OBJECTIVE BOX
[facility] Division of Hospital Medicine  Kristin Huynh MD EdM  Pager [phone]  Also available on MS Teams    SUBJECTIVE / OVERNIGHT EVENTS:  No events overnight  This morning is feeling constipated and would like miralax  Pain in her legs and right hip is stable    MEDICATIONS  (STANDING):  albuterol/ipratropium for Nebulization 3 milliLiter(s) Nebulizer every 6 hours  bacitracin   Ointment 1 Application(s) Topical two times a day  chlorhexidine 2% Cloths 1 Application(s) Topical daily  clonazePAM  Tablet 0.5 milliGRAM(s) Oral every 12 hours  doxycycline monohydrate Capsule 100 milliGRAM(s) Oral every 12 hours  gabapentin 100 milliGRAM(s) Oral every 8 hours  melatonin 6 milliGRAM(s) Oral at bedtime  nicotine -  14 mG/24Hr(s) Patch 1 Patch Transdermal daily  NIFEdipine XL 60 milliGRAM(s) Oral daily  rivaroxaban 15 milliGRAM(s) Oral two times a day with meals    MEDICATIONS  (PRN):  haloperidol    Injectable 2 milliGRAM(s) IV Push every 8 hours PRN agitation  oxycodone    5 mG/acetaminophen 325 mG 2 Tablet(s) Oral every 4 hours PRN Severe Pain (7 - 10)  zolpidem 5 milliGRAM(s) Oral at bedtime PRN Insomnia      I&O's Summary    27 May 2023 07:01  -  27 May 2023 14:26  --------------------------------------------------------  IN: 0 mL / OUT: 1400 mL / NET: -1400 mL        PHYSICAL EXAM:  Vital Signs Last 24 Hrs  T(C): 36.4 (27 May 2023 12:01), Max: 36.7 (26 May 2023 16:00)  T(F): 97.6 (27 May 2023 12:01), Max: 98 (26 May 2023 16:00)  HR: 62 (27 May 2023 12:01) (62 - 67)  BP: 136/77 (27 May 2023 12:01) (136/77 - 154/68)  BP(mean): --  RR: 17 (27 May 2023 12:01) (17 - 18)  SpO2: 98% (27 May 2023 12:01) (96% - 99%)    Parameters below as of 27 May 2023 12:01  Patient On (Oxygen Delivery Method): room air      CONSTITUTIONAL: no acute distress, pleasant and conversant  EYES: conjunctiva and sclera clear  ENMT: Moist oral mucosa  RESPIRATORY: Normal respiratory effort; lungs are clear to auscultation bilaterally  CARDIOVASCULAR: Regular rate and rhythm, normal S1 and S2, no murmur/rub/gallop; No lower extremity edema  ABDOMEN: no tenderness to palpation, normoactive bowel sounds, no rebound/guarding  PSYCH: Alert; affect appropriate  NEUROLOGY: CN 2-12 are intact and symmetric; moving all extremities   SKIN: No rashes on examined skin    LABS:                    COVID-19 PCR: NotDetec (25 May 2023 11:28)  SARS-CoV-2: NotDetec (17 May 2023 03:01)  COVID-19 PCR: NotDetec (25 Apr 2023 06:05)  COVID-19 PCR: NotDetec (22 Apr 2023 07:05)  COVID-19 PCR: NotDetec (18 Apr 2023 13:07)  SARS-CoV-2: NotDetec (11 Apr 2023 21:08)

## 2023-05-27 NOTE — PROGRESS NOTE ADULT - ASSESSMENT
77F w/ 77F w/ HTN, CAD, COPD, EtOH use disorder, prior intravenous drug use, opioid use disorder, h/o MRSA bacteremia, chronic R hip OM, seizure disorder admitted with encephalopathy due to possible opioid overdose, course c/b sepsis 2/2 RLE chronic OM – patient declined surgery in the past. ID following on currently receiving chronic doxycycline. Planning for discharge to [facility].

## 2023-05-27 NOTE — PROGRESS NOTE ADULT - PROBLEM SELECTOR PLAN 1
-On admission, pt w/ fever to 102 and tachycardia, sepsis now resolved.  Sepsis 2/2 R hip OM and overlying cellulitis   -s/p broad spectrum abx w/ cefepime and vanco (5/16-5/17), will now require indefinite suppressive therapy with PO doxy 100mg BID   -BCx, UCx, CXR wnl  -Prior team able to get culture data from [facility], documented in note from 5/23   -Pt and family declining operative intervention and prefers palliative longterm abx therapy and rehab

## 2023-05-28 PROCEDURE — 99232 SBSQ HOSP IP/OBS MODERATE 35: CPT

## 2023-05-28 RX ORDER — SENNA PLUS 8.6 MG/1
2 TABLET ORAL AT BEDTIME
Refills: 0 | Status: DISCONTINUED | OUTPATIENT
Start: 2023-05-28 | End: 2023-05-31

## 2023-05-28 RX ORDER — POLYETHYLENE GLYCOL 3350 17 G/17G
17 POWDER, FOR SOLUTION ORAL ONCE
Refills: 0 | Status: COMPLETED | OUTPATIENT
Start: 2023-05-28 | End: 2023-05-28

## 2023-05-28 RX ADMIN — Medication 60 MILLIGRAM(S): at 05:19

## 2023-05-28 RX ADMIN — Medication 1 APPLICATION(S): at 05:17

## 2023-05-28 RX ADMIN — GABAPENTIN 100 MILLIGRAM(S): 400 CAPSULE ORAL at 15:04

## 2023-05-28 RX ADMIN — Medication 1 APPLICATION(S): at 17:48

## 2023-05-28 RX ADMIN — RIVAROXABAN 15 MILLIGRAM(S): KIT at 17:42

## 2023-05-28 RX ADMIN — Medication 100 MILLIGRAM(S): at 05:19

## 2023-05-28 RX ADMIN — Medication 0.5 MILLIGRAM(S): at 17:43

## 2023-05-28 RX ADMIN — ZOLPIDEM TARTRATE 5 MILLIGRAM(S): 10 TABLET ORAL at 21:01

## 2023-05-28 RX ADMIN — GABAPENTIN 100 MILLIGRAM(S): 400 CAPSULE ORAL at 05:18

## 2023-05-28 RX ADMIN — Medication 0.5 MILLIGRAM(S): at 05:19

## 2023-05-28 RX ADMIN — Medication 6 MILLIGRAM(S): at 21:01

## 2023-05-28 RX ADMIN — CHLORHEXIDINE GLUCONATE 1 APPLICATION(S): 213 SOLUTION TOPICAL at 17:42

## 2023-05-28 RX ADMIN — Medication 100 MILLIGRAM(S): at 17:44

## 2023-05-28 RX ADMIN — RIVAROXABAN 15 MILLIGRAM(S): KIT at 09:47

## 2023-05-28 RX ADMIN — GABAPENTIN 100 MILLIGRAM(S): 400 CAPSULE ORAL at 21:01

## 2023-05-28 NOTE — PROGRESS NOTE ADULT - ASSESSMENT
77F w/ 77F w/ HTN, CAD, COPD, EtOH use disorder, prior intravenous drug use, opioid use disorder, h/o MRSA bacteremia, chronic R hip OM, seizure disorder admitted with encephalopathy due to possible opioid overdose, course c/b sepsis 2/2 RLE chronic OM – patient declined surgery when offered in the past due to concern that it would leave her unable to walk. ID following on currently receiving chronic suppressive doxycycline. Planning for discharge to [facility].

## 2023-05-28 NOTE — PROGRESS NOTE ADULT - SUBJECTIVE AND OBJECTIVE BOX
[facility] Division of Hospital Medicine  Kristin Huynh MD EdM  Pager [phone]  Also available on MS Teams    SUBJECTIVE / OVERNIGHT EVENTS:  No events overnight  Still feeling constipated, waiting for miralax  Pain reasonably well controlled    MEDICATIONS  (STANDING):  albuterol/ipratropium for Nebulization 3 milliLiter(s) Nebulizer every 6 hours  bacitracin   Ointment 1 Application(s) Topical two times a day  chlorhexidine 2% Cloths 1 Application(s) Topical daily  clonazePAM  Tablet 0.5 milliGRAM(s) Oral every 12 hours  doxycycline monohydrate Capsule 100 milliGRAM(s) Oral every 12 hours  gabapentin 100 milliGRAM(s) Oral every 8 hours  melatonin 6 milliGRAM(s) Oral at bedtime  nicotine -  14 mG/24Hr(s) Patch 1 Patch Transdermal daily  NIFEdipine XL 60 milliGRAM(s) Oral daily  polyethylene glycol 3350 17 Gram(s) Oral once  rivaroxaban 15 milliGRAM(s) Oral two times a day with meals  senna 2 Tablet(s) Oral at bedtime    MEDICATIONS  (PRN):  haloperidol    Injectable 2 milliGRAM(s) IV Push every 8 hours PRN agitation  oxycodone    5 mG/acetaminophen 325 mG 2 Tablet(s) Oral every 4 hours PRN Severe Pain (7 - 10)  zolpidem 5 milliGRAM(s) Oral at bedtime PRN Insomnia      I&O's Summary    27 May 2023 07:01  -  28 May 2023 07:00  --------------------------------------------------------  IN: 200 mL / OUT: 2900 mL / NET: -2700 mL        PHYSICAL EXAM:  Vital Signs Last 24 Hrs  T(C): 36.4 (28 May 2023 05:00), Max: 37 (27 May 2023 15:38)  T(F): 97.6 (28 May 2023 05:00), Max: 98.6 (27 May 2023 15:38)  HR: 62 (28 May 2023 05:00) (62 - 87)  BP: 146/64 (28 May 2023 05:00) (130/81 - 155/58)  BP(mean): 97 (28 May 2023 05:00) (83 - 97)  RR: 19 (28 May 2023 05:00) (16 - 19)  SpO2: 96% (28 May 2023 05:00) (96% - 99%)    Parameters below as of 28 May 2023 05:00  Patient On (Oxygen Delivery Method): room air      CONSTITUTIONAL: no acute distress, conversant  EYES: conjunctiva and sclera clear  ENMT: Moist oral mucosa  RESPIRATORY: Normal respiratory effort; lungs are clear to auscultation bilaterally  CARDIOVASCULAR: Regular rate and rhythm, normal S1 and S2, no murmur/rub/gallop; No lower extremity edema  ABDOMEN: no tenderness to palpation, normoactive bowel sounds, no rebound/guarding  PSYCH: Alert; affect appropriate  NEUROLOGY: CN 2-12 are intact and symmetric; moving all extremities   SKIN: No rashes on examined skin    LABS:          COVID-19 PCR: NotDetec (25 May 2023 11:28)  SARS-CoV-2: NotDetec (17 May 2023 03:01)  COVID-19 PCR: NotDetec (25 Apr 2023 06:05)  COVID-19 PCR: NotDetec (22 Apr 2023 07:05)  COVID-19 PCR: NotDetec (18 Apr 2023 13:07)  SARS-CoV-2: NotDetec (11 Apr 2023 21:08)

## 2023-05-28 NOTE — PROGRESS NOTE ADULT - PROBLEM SELECTOR PLAN 9
DVT: xarelto  Diet: regular diet  Dispo: NAYAN SALVADOR aware and in communication with daughter for placement   Code Status: full code

## 2023-05-28 NOTE — PROGRESS NOTE ADULT - PROBLEM SELECTOR PLAN 1
-On admission, pt w/ fever to 102 and tachycardia, sepsis now resolved.  Sepsis 2/2 R hip OM and overlying cellulitis   -s/p broad spectrum abx w/ cefepime and vanco (5/16-5/17), will now require indefinite suppressive therapy with PO doxy 100mg BID   -BCx, UCx, CXR wnl  -Prior team able to get culture data from [facility], documented in note from 5/23   -Pt and family declining operative intervention and prefers palliative longterm abx therapy and rehab

## 2023-05-29 LAB — SARS-COV-2 RNA SPEC QL NAA+PROBE: SIGNIFICANT CHANGE UP

## 2023-05-29 PROCEDURE — 99232 SBSQ HOSP IP/OBS MODERATE 35: CPT

## 2023-05-29 RX ADMIN — RIVAROXABAN 15 MILLIGRAM(S): KIT at 17:33

## 2023-05-29 RX ADMIN — RIVAROXABAN 15 MILLIGRAM(S): KIT at 08:21

## 2023-05-29 RX ADMIN — Medication 100 MILLIGRAM(S): at 05:02

## 2023-05-29 RX ADMIN — Medication 100 MILLIGRAM(S): at 17:35

## 2023-05-29 RX ADMIN — Medication 6 MILLIGRAM(S): at 21:06

## 2023-05-29 RX ADMIN — Medication 0.5 MILLIGRAM(S): at 08:21

## 2023-05-29 RX ADMIN — Medication 1 APPLICATION(S): at 05:02

## 2023-05-29 RX ADMIN — GABAPENTIN 100 MILLIGRAM(S): 400 CAPSULE ORAL at 21:06

## 2023-05-29 RX ADMIN — ZOLPIDEM TARTRATE 5 MILLIGRAM(S): 10 TABLET ORAL at 21:06

## 2023-05-29 RX ADMIN — GABAPENTIN 100 MILLIGRAM(S): 400 CAPSULE ORAL at 05:02

## 2023-05-29 RX ADMIN — CHLORHEXIDINE GLUCONATE 1 APPLICATION(S): 213 SOLUTION TOPICAL at 13:07

## 2023-05-29 RX ADMIN — Medication 60 MILLIGRAM(S): at 05:02

## 2023-05-29 RX ADMIN — GABAPENTIN 100 MILLIGRAM(S): 400 CAPSULE ORAL at 13:08

## 2023-05-29 RX ADMIN — Medication 1 APPLICATION(S): at 17:34

## 2023-05-29 NOTE — PROVIDER CONTACT NOTE (OTHER) - DATE AND TIME:
21-May-2023 17:25
22-May-2023 15:50
20-May-2023 07:50
21-May-2023 10:37
17-May-2023 01:29
17-May-2023 21:53
18-May-2023 17:00
17-May-2023 10:48
18-May-2023 15:00
20-May-2023 04:55
29-May-2023 13:15
17-May-2023 06:05
17-May-2023 07:24
20-May-2023 01:20
22-May-2023 06:05
23-May-2023 09:07
21-May-2023 22:33

## 2023-05-29 NOTE — PROGRESS NOTE ADULT - ASSESSMENT
77F w/ 77F w/ HTN, CAD, COPD, EtOH use disorder, prior intravenous drug use, opioid use disorder, h/o MRSA bacteremia, chronic R hip OM, seizure disorder admitted with encephalopathy due to possible opioid overdose, course c/b sepsis 2/2 RLE chronic OM – patient declined surgery when offered in the past due to concern that it would leave her unable to walk. ID following on currently receiving chronic suppressive doxycycline. Planning for discharge to [facility].

## 2023-05-29 NOTE — PROVIDER CONTACT NOTE (OTHER) - REASON
BP 82/48 and refuses labs and meds
BP 92/42
Patient refusing aPTT lab draw
patient refused nicoderm patch states "will not do anything for me"
Patient HTN, see vs flowsheet
Patient refusing telemetry monitoring
patient refusing aptt
refusing tele
Pt agitated
pain and anxiety
pain
patient refusing aptt
pt refused melatonin
Elevated temp
Patient in pain 10/10, requesting something other than oxycodone 10mg
, BP 99/40, refusing tele and meds, complaining of pain
pt pulled out IV

## 2023-05-29 NOTE — PROVIDER CONTACT NOTE (OTHER) - ASSESSMENT
Patient HTN, see vs flowsheet
Pt A&Ox4, no complaints of chest pain, SOB, or dizziness
VS stable. Complains of pain.
pt pulled out IV, bleeding was controlled by applying pressure. site wrapped with gauze and tape. pt refusing new IV at the moment. Heparin infusion paused.
, BP 99/40. Complaining of right hip pain. Received percocet at 6:39 but still in 10/10 pain. Refusing to keep tele on. Refusing vancomycin and heparin. Writer educated Pt on medications purpose.
Pt A&Ox4, no complaints of chest pain, SOB, or dizziness
Pt AOx2. Responsive to tactile stimuli but not having sustained awakening
a/ox2-3 to name, birthday, not understand to situation    Pt is screaming "I have pain" and upon assessment for CIWA, pt BP is 82/48. Denies any headache, dizziness, n/v or syncopal episodes
Patient AxOx4. VSS. No acute signs of distress noted. Patient educated on the importance of lab value monitoring. Safety maintained
VS stable beside elevated temperature. Pt body is warm to touch. Complains of pain. Pain medication given. safety maintained.
Patient asymptomatic vital wnl
Patient refused tele overnight. Still refusing this morning  Team made aware  Patient educated but still refused
Patient AxOx4. VSS. No acute signs of distress noted. Patient educated on the importance of telemetry monitoring. Safety maintained.
Pt agitated
pt refused melatonin

## 2023-05-29 NOTE — PROVIDER CONTACT NOTE (OTHER) - SITUATION
patient got first dose of percocet for pain. 1 after receiving pain medicine patient still in same level of pain. team aware  awaiting orders
Patient temperature is 100.8
Pt agitated and trying to leave. Asking for pain medication. Took tele wires off and removed nasal cannula, refusing to put it back on.
patient complaining of severe pain and anxiety   Provider okay'd giving klonipin and percocet early. see provider to RN
patient refused nicoderm patch states "will not do anything for me"
pt refused melatonin
Patient refusing telemetry monitoring
Patient refused tele overnight. Still refusing this morning  Team made aware  Patient educated but still refused
pt pulled out IV, bleeding was controlled by applying pressure. site wrapped with gauze and tape. pt refusing new IV at the moment. Heparin infusion paused.
, BP 99/40. Complaining of right hip pain. Refusing to keep tele on. Refusing vancomycin and heparin.
BP 92/42, Pt not having sustained awakening
MD made aware PT refusing scheduled aptt as ordered for heparin nomogram,
Patient refusing aPTT lab draw
Pt is screaming "I have pain" and upon assessment for CIWA, pt BP is 82/48. Denies any headache, dizziness, n/v or syncopal episodes
MD made aware I don't see a recent aptt drawn in results for Pt
Patient HTN, see vs flowsheet
Patient in pain 10/10, requesting something other than oxycodone 10mg

## 2023-05-29 NOTE — PROGRESS NOTE ADULT - PROBLEM SELECTOR PLAN 1
-On admission, pt w/ fever to 102 and tachycardia, sepsis now resolved.  Sepsis 2/2 R hip OM and overlying cellulitis   -s/p broad spectrum abx w/ cefepime and vanco (5/16-5/17), will now require indefinite suppressive therapy with PO doxy 100mg BID   -BCx, UCx, CXR wnl  -Prior team able to get culture data from [facility], documented in note from 5/23   -Pt and family declining operative intervention and prefers palliative longterm abx therapy and rehab

## 2023-05-29 NOTE — PROGRESS NOTE ADULT - SUBJECTIVE AND OBJECTIVE BOX
[facility] Division of Hospital Medicine  Kristin Huynh MD EdM  Pager [phone]  Also available on MS Teams    SUBJECTIVE / OVERNIGHT EVENTS:  No events overnight  Was able to have a BM yesterday  Pain medications continue to help with pain  Continue to wait for [facility] placement    MEDICATIONS  (STANDING):  albuterol/ipratropium for Nebulization 3 milliLiter(s) Nebulizer every 6 hours  bacitracin   Ointment 1 Application(s) Topical two times a day  chlorhexidine 2% Cloths 1 Application(s) Topical daily  clonazePAM  Tablet 0.5 milliGRAM(s) Oral every 12 hours  doxycycline monohydrate Capsule 100 milliGRAM(s) Oral every 12 hours  gabapentin 100 milliGRAM(s) Oral every 8 hours  melatonin 6 milliGRAM(s) Oral at bedtime  nicotine -  14 mG/24Hr(s) Patch 1 Patch Transdermal daily  NIFEdipine XL 60 milliGRAM(s) Oral daily  rivaroxaban 15 milliGRAM(s) Oral two times a day with meals  senna 2 Tablet(s) Oral at bedtime    MEDICATIONS  (PRN):  haloperidol    Injectable 2 milliGRAM(s) IV Push every 8 hours PRN agitation  oxycodone    5 mG/acetaminophen 325 mG 2 Tablet(s) Oral every 4 hours PRN Severe Pain (7 - 10)  zolpidem 5 milliGRAM(s) Oral at bedtime PRN Insomnia      I&O's Summary      PHYSICAL EXAM:  Vital Signs Last 24 Hrs  T(C): 36.7 (29 May 2023 07:30), Max: 36.7 (28 May 2023 20:40)  T(F): 98.1 (29 May 2023 07:30), Max: 98.1 (28 May 2023 20:40)  HR: 65 (29 May 2023 07:30) (61 - 65)  BP: 136/65 (29 May 2023 07:30) (135/61 - 143/58)  BP(mean): --  RR: 18 (29 May 2023 07:30) (18 - 18)  SpO2: 96% (29 May 2023 07:30) (96% - 100%)    Parameters below as of 29 May 2023 07:30  Patient On (Oxygen Delivery Method): room air      CONSTITUTIONAL: no acute distress, conversant  EYES: conjunctiva and sclera clear  ENMT: Moist oral mucosa  RESPIRATORY: Normal respiratory effort; lungs are clear to auscultation bilaterally  CARDIOVASCULAR: Regular rate and rhythm, normal S1 and S2, no murmur/rub/gallop; No lower extremity edema  ABDOMEN: no tenderness to palpation, normoactive bowel sounds, no rebound/guarding  PSYCH: Alert; affect appropriate  NEUROLOGY: CN 2-12 are intact and symmetric; moving all extremities   SKIN: No rashes on examined skin    LABS:                    COVID-19 PCR: NotDetec (29 May 2023 06:36)  COVID-19 PCR: NotDetec (25 May 2023 11:28)  SARS-CoV-2: NotDetec (17 May 2023 03:01)  COVID-19 PCR: NotDetec (25 Apr 2023 06:05)  COVID-19 PCR: NotDetec (22 Apr 2023 07:05)  COVID-19 PCR: NotDetec (18 Apr 2023 13:07)  SARS-CoV-2: NotDetec (11 Apr 2023 21:08)

## 2023-05-29 NOTE — PROVIDER CONTACT NOTE (OTHER) - RECOMMENDATIONS
Provide to come to bedside.
provider notified
MD - notified
Notify provider.
Notify provider
Notify provider
As per MD Will juanempt new IV in 30 mins
Notify provider
Notify provider
provider to assess patient

## 2023-05-29 NOTE — PROVIDER CONTACT NOTE (OTHER) - BACKGROUND
Patient admitted for sepsis.
Patient admitted for sepsis.
Pt admitted for possible overdose.
Patient admitted for sepsis.
Pt admitted for sepsis. Diagnosed with Right DVT
78 Y/O F PMH Alcohol abuse, Anxiety, CAD (coronary artery disease), Emphysema, HLD, HTN, Migraine, hx of MRSA Bacteremia, Seizure disorder was brought in for change in mental status.
Patient admitted for sepsis.
admitted for sepsis
78 Y/O F PMH Alcohol abuse, Anxiety, CAD (coronary artery disease), Emphysema, HLD, HTN, Migraine, hx of MRSA Bacteremia, Seizure disorder was brought in for change in mental status.
78 Y/O F PMH Alcohol abuse, Anxiety, CAD (coronary artery disease), Emphysema, HLD, HTN, Migraine, hx of MRSA Bacteremia, Seizure disorder was brought in for change in mental status.
Admitted for possible opoid overdose
Pt admitted for sepsis. Diagnosed with Right DVT
sepsis
Pt admitted for possible opioid overdose

## 2023-05-29 NOTE — PROVIDER CONTACT NOTE (OTHER) - NAME OF MD/NP/PA/DO NOTIFIED:
leandro maloney
Eliazar Lorenzo
MD - Kristin Zapata
Herve Dulmovits
MD Becky Huerta
Provider Louie Sarabia
leandro maloney
Juan Pope
MD Becky Huerta
Team 2 Jarod Diaz MD
emiliana power
Eliazar Lorenzo
MD - Kristin Zapata
Benito Rehman
Juan Pope
Provider Louie Sarabia
Chandra Giraldo

## 2023-05-30 PROCEDURE — 99232 SBSQ HOSP IP/OBS MODERATE 35: CPT

## 2023-05-30 RX ADMIN — Medication 1 APPLICATION(S): at 17:10

## 2023-05-30 RX ADMIN — GABAPENTIN 100 MILLIGRAM(S): 400 CAPSULE ORAL at 05:53

## 2023-05-30 RX ADMIN — RIVAROXABAN 15 MILLIGRAM(S): KIT at 17:08

## 2023-05-30 RX ADMIN — ZOLPIDEM TARTRATE 5 MILLIGRAM(S): 10 TABLET ORAL at 21:03

## 2023-05-30 RX ADMIN — RIVAROXABAN 15 MILLIGRAM(S): KIT at 08:53

## 2023-05-30 RX ADMIN — Medication 0.5 MILLIGRAM(S): at 05:53

## 2023-05-30 RX ADMIN — Medication 100 MILLIGRAM(S): at 17:09

## 2023-05-30 RX ADMIN — GABAPENTIN 100 MILLIGRAM(S): 400 CAPSULE ORAL at 21:03

## 2023-05-30 RX ADMIN — Medication 100 MILLIGRAM(S): at 05:53

## 2023-05-30 RX ADMIN — Medication 60 MILLIGRAM(S): at 05:52

## 2023-05-30 RX ADMIN — SENNA PLUS 2 TABLET(S): 8.6 TABLET ORAL at 21:03

## 2023-05-30 RX ADMIN — CHLORHEXIDINE GLUCONATE 1 APPLICATION(S): 213 SOLUTION TOPICAL at 11:59

## 2023-05-30 RX ADMIN — GABAPENTIN 100 MILLIGRAM(S): 400 CAPSULE ORAL at 14:24

## 2023-05-30 RX ADMIN — Medication 6 MILLIGRAM(S): at 22:05

## 2023-05-30 RX ADMIN — Medication 1 APPLICATION(S): at 05:53

## 2023-05-30 NOTE — PROGRESS NOTE ADULT - SUBJECTIVE AND OBJECTIVE BOX
Madonna Vega  Hospitalist  Pager- [phone]    SUBJECTIVE / OVERNIGHT EVENTS:  No events overnight  Continue to wait for [facility] placement    MEDICATIONS  (STANDING):  albuterol/ipratropium for Nebulization 3 milliLiter(s) Nebulizer every 6 hours  bacitracin   Ointment 1 Application(s) Topical two times a day  chlorhexidine 2% Cloths 1 Application(s) Topical daily  clonazePAM  Tablet 0.5 milliGRAM(s) Oral every 12 hours  doxycycline monohydrate Capsule 100 milliGRAM(s) Oral every 12 hours  gabapentin 100 milliGRAM(s) Oral every 8 hours  melatonin 6 milliGRAM(s) Oral at bedtime  nicotine -  14 mG/24Hr(s) Patch 1 Patch Transdermal daily  NIFEdipine XL 60 milliGRAM(s) Oral daily  rivaroxaban 15 milliGRAM(s) Oral two times a day with meals  senna 2 Tablet(s) Oral at bedtime    MEDICATIONS  (PRN):  haloperidol    Injectable 2 milliGRAM(s) IV Push every 8 hours PRN agitation  oxycodone    5 mG/acetaminophen 325 mG 2 Tablet(s) Oral every 4 hours PRN Severe Pain (7 - 10)  zolpidem 5 milliGRAM(s) Oral at bedtime PRN Insomnia    I&O's Summary    PHYSICAL EXAM:    Vital Signs Last 24 Hrs  T(C): 36.6 (30 May 2023 08:50), Max: 37.1 (29 May 2023 14:40)  T(F): 97.8 (30 May 2023 08:50), Max: 98.7 (29 May 2023 14:40)  HR: 60 (30 May 2023 08:50) (55 - 64)  BP: 130/63 (30 May 2023 08:50) (128/48 - 141/54)  BP(mean): --  RR: 18 (30 May 2023 08:50) (18 - 18)  SpO2: 97% (30 May 2023 08:50) (95% - 99%)    Parameters below as of 30 May 2023 08:50  Patient On (Oxygen Delivery Method): room air      CONSTITUTIONAL: no acute distress, conversant  EYES: conjunctiva and sclera clear  ENMT: Moist oral mucosa  RESPIRATORY: Normal respiratory effort; lungs are clear to auscultation bilaterally  CARDIOVASCULAR: Regular rate and rhythm, normal S1 and S2, no murmur/rub/gallop; No lower extremity edema  ABDOMEN: no tenderness to palpation, normoactive bowel sounds, no rebound/guarding  PSYCH: Alert; affect appropriate  NEUROLOGY: CN 2-12 are intact and symmetric; moving all extremities   SKIN: No rashes on examined skin    LABS:                   Madonna Vega  Hospitalist  Pager- [phone]    SUBJECTIVE / OVERNIGHT EVENTS:  No events overnight. Reports she walked with a walker yday   Asking to speak with the SW   Continue to wait for M[facility] placement    MEDICATIONS  (STANDING):  albuterol/ipratropium for Nebulization 3 milliLiter(s) Nebulizer every 6 hours  bacitracin   Ointment 1 Application(s) Topical two times a day  chlorhexidine 2% Cloths 1 Application(s) Topical daily  clonazePAM  Tablet 0.5 milliGRAM(s) Oral every 12 hours  doxycycline monohydrate Capsule 100 milliGRAM(s) Oral every 12 hours  gabapentin 100 milliGRAM(s) Oral every 8 hours  melatonin 6 milliGRAM(s) Oral at bedtime  nicotine -  14 mG/24Hr(s) Patch 1 Patch Transdermal daily  NIFEdipine XL 60 milliGRAM(s) Oral daily  rivaroxaban 15 milliGRAM(s) Oral two times a day with meals  senna 2 Tablet(s) Oral at bedtime    MEDICATIONS  (PRN):  haloperidol    Injectable 2 milliGRAM(s) IV Push every 8 hours PRN agitation  oxycodone    5 mG/acetaminophen 325 mG 2 Tablet(s) Oral every 4 hours PRN Severe Pain (7 - 10)  zolpidem 5 milliGRAM(s) Oral at bedtime PRN Insomnia    I&O's Summary    PHYSICAL EXAM:    Vital Signs Last 24 Hrs  T(C): 36.6 (30 May 2023 08:50), Max: 37.1 (29 May 2023 14:40)  T(F): 97.8 (30 May 2023 08:50), Max: 98.7 (29 May 2023 14:40)  HR: 60 (30 May 2023 08:50) (55 - 64)  BP: 130/63 (30 May 2023 08:50) (128/48 - 141/54)  BP(mean): --  RR: 18 (30 May 2023 08:50) (18 - 18)  SpO2: 97% (30 May 2023 08:50) (95% - 99%)    Parameters below as of 30 May 2023 08:50  Patient On (Oxygen Delivery Method): room air      CONSTITUTIONAL: no acute distress, conversant  EYES: conjunctiva and sclera clear  ENMT: Moist oral mucosa  RESPIRATORY: Normal respiratory effort; lungs are clear to auscultation bilaterally  CARDIOVASCULAR: Regular rate and rhythm, normal S1 and S2, no murmur/rub/gallop; No lower extremity edema  ABDOMEN: no tenderness to palpation, normoactive bowel sounds, no rebound/guarding  PSYCH: Alert; affect appropriate  NEUROLOGY: CN 2-12 are intact and symmetric; moving all extremities   SKIN: No rashes on examined skin    LABS:

## 2023-05-30 NOTE — PROGRESS NOTE ADULT - ASSESSMENT
77F w/ 77F w/ HTN, CAD, COPD, EtOH use disorder, prior intravenous drug use, opioid use disorder, h/o MRSA bacteremia, chronic R hip OM, seizure disorder admitted with encephalopathy due to possible opioid overdose, course c/b sepsis 2/2 RLE chronic OM – patient declined surgery when offered in the past due to concern that it would leave her unable to walk. ID following on currently receiving chronic suppressive doxycycline. Planning for discharge to [facility]. 77F w/ 77F w/ HTN, CAD, COPD, EtOH use disorder, prior intravenous drug use, opioid use disorder, h/o MRSA bacteremia, chronic R hip OM, seizure disorder admitted with encephalopathy due to possible opioid overdose, course c/b sepsis 2/2 RLE chronic OM – patient declined surgery when offered in the past due to concern that it would leave her unable to walk. ID following on currently receiving chronic suppressive doxycycline. Planning for discharge to [facility].

## 2023-05-30 NOTE — PROGRESS NOTE ADULT - PROBLEM SELECTOR PLAN 1
-On admission, pt w/ fever to 102 and tachycardia, sepsis now resolved.   - Sepsis 2/2 R hip OM and overlying cellulitis   -s/p broad spectrum abx w/ cefepime and vanco (5/16-5/17), will now require indefinite suppressive therapy with PO doxy 100mg BID   -BCx, UCx, CXR wnl  -Prior team able to get culture data from [facility], documented in note from 5/23   -Pt and family declining operative intervention and prefers palliative longterm abx therapy and rehab

## 2023-05-30 NOTE — PROGRESS NOTE ADULT - PROBLEM SELECTOR PLAN 4
-Pt w/ hx of diastolic HF w/ mild pulm HTN, on lasix 40mg at home, has been on hold in setting of sepsis  -Echo (4/22): EF 75% w/ stage 2 diastolic function  Currently patient euvolemic  Consider resuming lasix -Pt w/ hx of diastolic HF w/ mild pulm HTN, on lasix 40mg at home, has been on hold in setting of sepsis  -Echo (4/22): EF 75% w/ stage 2 diastolic function  Currently patient euvolemic  Consider resuming lasix pending volume status

## 2023-05-30 NOTE — PROGRESS NOTE ADULT - PROBLEM SELECTOR PLAN 5
-Pt w/ hx of HTN, on home losartan, nifedipine, and hydralazine.  > on home nifedipine 60 qday  -SBP within goal

## 2023-05-31 ENCOUNTER — TRANSCRIPTION ENCOUNTER (OUTPATIENT)
Age: 78
End: 2023-05-31

## 2023-05-31 VITALS
DIASTOLIC BLOOD PRESSURE: 56 MMHG | SYSTOLIC BLOOD PRESSURE: 148 MMHG | HEART RATE: 60 BPM | OXYGEN SATURATION: 100 % | RESPIRATION RATE: 18 BRPM | TEMPERATURE: 98 F

## 2023-05-31 PROCEDURE — 99239 HOSP IP/OBS DSCHRG MGMT >30: CPT

## 2023-05-31 RX ORDER — CLONAZEPAM 1 MG
1 TABLET ORAL
Qty: 0 | Refills: 0 | DISCHARGE
Start: 2023-05-31

## 2023-05-31 RX ORDER — NIFEDIPINE 30 MG
1 TABLET, EXTENDED RELEASE 24 HR ORAL
Qty: 0 | Refills: 0 | DISCHARGE
Start: 2023-05-31

## 2023-05-31 RX ORDER — ZOLPIDEM TARTRATE 10 MG/1
1 TABLET ORAL
Refills: 0 | DISCHARGE

## 2023-05-31 RX ORDER — SENNA PLUS 8.6 MG/1
2 TABLET ORAL
Qty: 0 | Refills: 0 | DISCHARGE
Start: 2023-05-31

## 2023-05-31 RX ORDER — LOSARTAN POTASSIUM 100 MG/1
1 TABLET, FILM COATED ORAL
Qty: 0 | Refills: 0 | DISCHARGE
Start: 2023-05-31

## 2023-05-31 RX ORDER — MIRTAZAPINE 45 MG/1
1 TABLET, ORALLY DISINTEGRATING ORAL
Qty: 0 | Refills: 0 | DISCHARGE
Start: 2023-05-31

## 2023-05-31 RX ORDER — ZOLPIDEM TARTRATE 10 MG/1
1 TABLET ORAL
Qty: 0 | Refills: 0 | DISCHARGE
Start: 2023-05-31

## 2023-05-31 RX ORDER — NICOTINE POLACRILEX 2 MG
1 GUM BUCCAL
Qty: 0 | Refills: 0 | DISCHARGE
Start: 2023-05-31

## 2023-05-31 RX ORDER — FUROSEMIDE 40 MG
1 TABLET ORAL
Qty: 30 | Refills: 0
Start: 2023-05-31 | End: 2023-06-29

## 2023-05-31 RX ORDER — MIRTAZAPINE 45 MG/1
7.5 TABLET, ORALLY DISINTEGRATING ORAL AT BEDTIME
Refills: 0 | Status: DISCONTINUED | OUTPATIENT
Start: 2023-05-31 | End: 2023-05-31

## 2023-05-31 RX ORDER — LOSARTAN POTASSIUM 100 MG/1
25 TABLET, FILM COATED ORAL DAILY
Refills: 0 | Status: DISCONTINUED | OUTPATIENT
Start: 2023-05-31 | End: 2023-05-31

## 2023-05-31 RX ORDER — DIPHENOXYLATE HCL/ATROPINE 2.5-.025MG
2 TABLET ORAL
Refills: 0 | DISCHARGE

## 2023-05-31 RX ORDER — GABAPENTIN 400 MG/1
1 CAPSULE ORAL
Qty: 0 | Refills: 0 | DISCHARGE
Start: 2023-05-31

## 2023-05-31 RX ORDER — LANOLIN ALCOHOL/MO/W.PET/CERES
2 CREAM (GRAM) TOPICAL
Qty: 0 | Refills: 0 | DISCHARGE
Start: 2023-05-31

## 2023-05-31 RX ORDER — RIVAROXABAN 15 MG-20MG
1 KIT ORAL
Qty: 0 | Refills: 0 | DISCHARGE
Start: 2023-05-31

## 2023-05-31 RX ORDER — NIFEDIPINE 30 MG
1 TABLET, EXTENDED RELEASE 24 HR ORAL
Refills: 0 | DISCHARGE

## 2023-05-31 RX ADMIN — Medication 1 APPLICATION(S): at 05:10

## 2023-05-31 RX ADMIN — Medication 60 MILLIGRAM(S): at 05:12

## 2023-05-31 RX ADMIN — GABAPENTIN 100 MILLIGRAM(S): 400 CAPSULE ORAL at 05:11

## 2023-05-31 RX ADMIN — CHLORHEXIDINE GLUCONATE 1 APPLICATION(S): 213 SOLUTION TOPICAL at 11:31

## 2023-05-31 RX ADMIN — Medication 0.5 MILLIGRAM(S): at 05:11

## 2023-05-31 RX ADMIN — RIVAROXABAN 15 MILLIGRAM(S): KIT at 09:38

## 2023-05-31 RX ADMIN — LOSARTAN POTASSIUM 25 MILLIGRAM(S): 100 TABLET, FILM COATED ORAL at 11:32

## 2023-05-31 RX ADMIN — GABAPENTIN 100 MILLIGRAM(S): 400 CAPSULE ORAL at 14:58

## 2023-05-31 RX ADMIN — Medication 100 MILLIGRAM(S): at 05:12

## 2023-05-31 NOTE — PROGRESS NOTE ADULT - PROBLEM SELECTOR PLAN 2
-Pt w/ right hip pain chronic i/s/o chronic OM vs acute OM   -On lidocaine 4%, percocet based on I-STOP  > pain regimen percocet 5/325 x 2 tab (q4 PRN) per home meds-Took 2 PRNs in 24 hours  - Pain service consult reviewed, started Gabapentin 100mg q8 hrs 5/24  - Abx as above

## 2023-05-31 NOTE — PROGRESS NOTE ADULT - SUBJECTIVE AND OBJECTIVE BOX
Madonna Vega  Hospitalist  Pager- [phone]    SUBJECTIVE / OVERNIGHT EVENTS:  No events overnight.       MEDICATIONS  (STANDING):  albuterol/ipratropium for Nebulization 3 milliLiter(s) Nebulizer every 6 hours  bacitracin   Ointment 1 Application(s) Topical two times a day  chlorhexidine 2% Cloths 1 Application(s) Topical daily  clonazePAM  Tablet 0.5 milliGRAM(s) Oral every 12 hours  doxycycline monohydrate Capsule 100 milliGRAM(s) Oral every 12 hours  gabapentin 100 milliGRAM(s) Oral every 8 hours  melatonin 6 milliGRAM(s) Oral at bedtime  nicotine -  14 mG/24Hr(s) Patch 1 Patch Transdermal daily  NIFEdipine XL 60 milliGRAM(s) Oral daily  rivaroxaban 15 milliGRAM(s) Oral two times a day with meals  senna 2 Tablet(s) Oral at bedtime    MEDICATIONS  (PRN):  haloperidol    Injectable 2 milliGRAM(s) IV Push every 8 hours PRN agitation  oxycodone    5 mG/acetaminophen 325 mG 2 Tablet(s) Oral every 4 hours PRN Severe Pain (7 - 10)  zolpidem 5 milliGRAM(s) Oral at bedtime PRN Insomnia    I&O's Summary    PHYSICAL EXAM:  Vital Signs Last 24 Hrs  T(C): 36.7 (31 May 2023 07:30), Max: 36.7 (30 May 2023 20:12)  T(F): 98.1 (31 May 2023 07:30), Max: 98.1 (31 May 2023 07:30)  HR: 55 (31 May 2023 07:30) (55 - 68)  BP: 160/55 (31 May 2023 07:30) (130/56 - 160/55)  BP(mean): 72 (31 May 2023 04:30) (72 - 72)  RR: 18 (31 May 2023 07:30) (17 - 18)  SpO2: 99% (31 May 2023 07:30) (93% - 99%)    Parameters below as of 31 May 2023 07:30  Patient On (Oxygen Delivery Method): room air        CONSTITUTIONAL: no acute distress, conversant  EYES: conjunctiva and sclera clear  ENMT: Moist oral mucosa  RESPIRATORY: Normal respiratory effort; lungs are clear to auscultation bilaterally  CARDIOVASCULAR: Regular rate and rhythm, normal S1 and S2, no murmur/rub/gallop; No lower extremity edema  ABDOMEN: no tenderness to palpation, normoactive bowel sounds, no rebound/guarding  PSYCH: Alert; affect appropriate  NEUROLOGY: CN 2-12 are intact and symmetric; moving all extremities   SKIN: No rashes on examined skin    LABS:                   Madonna Vega  Hospitalist  Pager- [phone]    SUBJECTIVE / OVERNIGHT EVENTS:  No events overnight.   Reports chronic pain the hip- asking for Percocet  States she is able to walk with a walker       MEDICATIONS  (STANDING):  albuterol/ipratropium for Nebulization 3 milliLiter(s) Nebulizer every 6 hours  bacitracin   Ointment 1 Application(s) Topical two times a day  chlorhexidine 2% Cloths 1 Application(s) Topical daily  clonazePAM  Tablet 0.5 milliGRAM(s) Oral every 12 hours  doxycycline monohydrate Capsule 100 milliGRAM(s) Oral every 12 hours  gabapentin 100 milliGRAM(s) Oral every 8 hours  melatonin 6 milliGRAM(s) Oral at bedtime  nicotine -  14 mG/24Hr(s) Patch 1 Patch Transdermal daily  NIFEdipine XL 60 milliGRAM(s) Oral daily  rivaroxaban 15 milliGRAM(s) Oral two times a day with meals  senna 2 Tablet(s) Oral at bedtime    MEDICATIONS  (PRN):  haloperidol    Injectable 2 milliGRAM(s) IV Push every 8 hours PRN agitation  oxycodone    5 mG/acetaminophen 325 mG 2 Tablet(s) Oral every 4 hours PRN Severe Pain (7 - 10)  zolpidem 5 milliGRAM(s) Oral at bedtime PRN Insomnia    I&O's Summary    PHYSICAL EXAM:  Vital Signs Last 24 Hrs  T(C): 36.7 (31 May 2023 07:30), Max: 36.7 (30 May 2023 20:12)  T(F): 98.1 (31 May 2023 07:30), Max: 98.1 (31 May 2023 07:30)  HR: 55 (31 May 2023 07:30) (55 - 68)  BP: 160/55 (31 May 2023 07:30) (130/56 - 160/55)  BP(mean): 72 (31 May 2023 04:30) (72 - 72)  RR: 18 (31 May 2023 07:30) (17 - 18)  SpO2: 99% (31 May 2023 07:30) (93% - 99%)    Parameters below as of 31 May 2023 07:30  Patient On (Oxygen Delivery Method): room air        CONSTITUTIONAL: no acute distress, conversant  EYES: conjunctiva and sclera clear  ENMT: Moist oral mucosa  RESPIRATORY: Normal respiratory effort; lungs are clear to auscultation bilaterally  CARDIOVASCULAR: Regular rate and rhythm, normal S1 and S2, no murmur/rub/gallop; No lower extremity edema  ABDOMEN: no tenderness to palpation, normoactive bowel sounds, no rebound/guarding  PSYCH: Alert; affect appropriate  NEUROLOGY: CN 2-12 are intact and symmetric; moving all extremities   SKIN: No rashes on examined skin    LABS:

## 2023-05-31 NOTE — PROGRESS NOTE ADULT - PROBLEM SELECTOR PLAN 1
-On admission, pt w/ fever to 102 and tachycardia, sepsis now resolved.   - Sepsis 2/2 R hip OM and overlying cellulitis   -s/p broad spectrum abx w/ cefepime and vanco (5/16-5/17), will now require indefinite suppressive therapy with PO doxy 100mg BID   -BCx, UCx, CXR wnl  -Prior team able to get culture data from [facility], documented in note from 5/23   -Pt and family declining operative intervention and prefers palliative longterm abx therapy and rehab -On admission, pt w/ fever to 102 and tachycardia, sepsis now resolved.   -Sepsis 2/2 R hip OM and overlying cellulitis   -s/p broad spectrum abx w/ cefepime and vanco (5/16-5/17), will now require indefinite suppressive therapy with PO doxy 100mg BID   -BCx, UCx, CXR wnl  -Prior team able to get culture data from [facility], documented in note from 5/23   -Pt and family declining operative intervention and prefers palliative longterm abx therapy and rehab

## 2023-05-31 NOTE — PROGRESS NOTE ADULT - PROBLEM SELECTOR PROBLEM 1
Severe sepsis

## 2023-05-31 NOTE — PROGRESS NOTE ADULT - PROBLEM SELECTOR PROBLEM 2
Right hip pain
LAI (acute kidney injury)
Right hip pain
Right hip pain
LAI (acute kidney injury)
Right hip pain
Right hip pain
Anxiety
Right hip pain

## 2023-05-31 NOTE — PROGRESS NOTE ADULT - PROBLEM SELECTOR PLAN 5
-Pt w/ hx of HTN, on home losartan, nifedipine, and hydralazine.  > on home nifedipine 60 qday  -SBP elevated to 160 this AM- will resume home med Losartan

## 2023-05-31 NOTE — DISCHARGE NOTE NURSING/CASE MANAGEMENT/SOCIAL WORK - PATIENT PORTAL LINK FT
You can access the FollowMyHealth Patient Portal offered by [facility] by registering at the following website: [email or web address]. By joining [facility]’s FollowMyHealth portal, you will also be able to view your health information using other applications (apps) compatible with our system.

## 2023-05-31 NOTE — PROGRESS NOTE ADULT - PROBLEM SELECTOR PLAN 7
-Pt w/ hx of anxiety, takes clonazepam 0.5 q8 and zolpidem 5mg qhs, remeron  -s/p CIWA bundle  > c/w home clonazepam 0.5 TID -Pt w/ hx of anxiety, takes clonazepam 0.5 q8 and zolpidem 5mg qhs, remeron  -s/p CIWA bundle  - c/w home clonazepam 0.5 TID  -Advised pt and daughter to FU with Psych as outpt to adjust meds

## 2023-05-31 NOTE — PHARMACOTHERAPY INTERVENTION NOTE - COMMENTS
Discharge medications reviewed with the patient. Current medication schedule was discussed in detail including: medication name, indication, dose, administration times, side effects, drug interactions, and special instructions. In particular, Educated patient on xarelto including the purpose and administration. Discussed adverse effects in detail and when to seek medical attention (blood in stool, vomit, etc.). Informed patient to notify all providers she is on this and before any planned procedures. All questions and concerns were answered and addressed. Patient verbalized understanding and was provided with educational handouts.     Nayt Sanchez, PharmD  Clinical Pharmacy Specialist  Spectra [phone]

## 2023-05-31 NOTE — PROGRESS NOTE ADULT - NUTRITIONAL ASSESSMENT
This patient has been assessed with a concern for Malnutrition and has been determined to have a diagnosis/diagnoses of Mild protein-calorie malnutrition.    This patient is being managed with:   Diet DASH/TLC-  Sodium & Cholesterol Restricted  Entered: May 17 2023  9:05AM  

## 2023-05-31 NOTE — PROGRESS NOTE ADULT - PROBLEM SELECTOR PLAN 4
contact guard -Pt w/ hx of diastolic HF w/ mild pulm HTN, on lasix 40mg at home, has been on hold in setting of sepsis  -Echo (4/22): EF 75% w/ stage 2 diastolic function  Currently patient euvolemic  Consider resuming lasix pending volume status

## 2023-05-31 NOTE — DISCHARGE NOTE NURSING/CASE MANAGEMENT/SOCIAL WORK - NSDCPEFALRISK_GEN_ALL_CORE
For information on Fall & Injury Prevention, visit: [email or web address] OR  [email or web address] OR  https://www.cdc.gov/steadi/patient.html

## 2023-05-31 NOTE — PROGRESS NOTE ADULT - PROBLEM SELECTOR PROBLEM 3
DVT, lower extremity
DVT, lower extremity
Right hip pain
DVT, lower extremity
Right hip pain
DVT, lower extremity
Right hip pain
DVT, lower extremity

## 2023-05-31 NOTE — PROGRESS NOTE ADULT - PROVIDER SPECIALTY LIST ADULT
Orthopedics
Hospitalist
Infectious Disease
Infectious Disease
Hospitalist
Hospitalist
Infectious Disease
Hospitalist
Hospitalist
Internal Medicine
Hospitalist
Internal Medicine
Internal Medicine
Palliative Care
Hospitalist
Hospitalist
Internal Medicine

## 2023-05-31 NOTE — CHART NOTE - NSCHARTNOTEFT_GEN_A_CORE
Source: Patient [x ]    Family [ ]     other [ x] Chart review    Current Diet : Diet, DASH/TLC:   Sodium & Cholesterol Restricted (05-17-23 @ 09:05) [Active]    PO intake: % [x ]  Height (cm): 157.5 (23 May 2023 17:30)  Weight (kg): 54.8 (23 May 2023 17:30), 57.5kg (5/18)  BMI (kg/m2): 22.1 (23 May 2023 17:30)    Nutrition Note: 77F w/ 77F w/ HTN, CAD, COPD, EtOH use disorder, prior intravenous drug use, opioid use disorder, h/o MRSA bacteremia, chronic R hip OM, seizure disorder admitted with encephalopathy due to possible opioid overdose, course c/b sepsis 2/2 RLE chronic OM – patient declined surgery when offered in the past due to concern that it would leave her unable to walk. ID following on currently receiving chronic suppressive doxycycline. Planning for discharge to [facility], per chart.     Patient is seen for nutrition follow- up. Patient reports good appetite, during visit. As per tray observation during visit, patient consumed >75% of meals, ~50% of Magic cup supplement. Patient denies any difficulty chewing/swallowing, any nausea, vomiting, diarrhea, constipation during visit. Patient reports last bowel movement 5/31/2023. Patient is on bowel regimen. Noted weight loss of -2.7kg/4.7%BW x 5 days, possibly 2/2 fluid shift. As per RN flow sheet, no edema noted at this time. Noted patient previously had 1+ edema to left and right foot documented on RN flow sheet. Will continue to monitor weight trend. As per RN flow sheet, patient had arteria wound to right ankle. No pressure injury noted at this time.     __________________ Pertinent Medications__________________   MEDICATIONS  (STANDING):  albuterol/ipratropium for Nebulization 3 milliLiter(s) Nebulizer every 6 hours  bacitracin   Ointment 1 Application(s) Topical two times a day  chlorhexidine 2% Cloths 1 Application(s) Topical daily  clonazePAM  Tablet 0.5 milliGRAM(s) Oral every 12 hours  doxycycline monohydrate Capsule 100 milliGRAM(s) Oral every 12 hours  gabapentin 100 milliGRAM(s) Oral every 8 hours  losartan 25 milliGRAM(s) Oral daily  melatonin 6 milliGRAM(s) Oral at bedtime  mirtazapine 7.5 milliGRAM(s) Oral at bedtime  nicotine -  14 mG/24Hr(s) Patch 1 Patch Transdermal daily  NIFEdipine XL 60 milliGRAM(s) Oral daily  rivaroxaban 15 milliGRAM(s) Oral two times a day with meals  senna 2 Tablet(s) Oral at bedtime    MEDICATIONS  (PRN):  haloperidol    Injectable 2 milliGRAM(s) IV Push every 8 hours PRN agitation  oxycodone    5 mG/acetaminophen 325 mG 2 Tablet(s) Oral every 4 hours PRN Severe Pain (7 - 10)  zolpidem 5 milliGRAM(s) Oral at bedtime PRN Insomnia      __________________ Pertinent Labs__________________    05-17 Chol 115 mg/dL LDL --    HDL 47 mg/dL<L> Trig 56 mg/dL        Estimated Needs:   [x ] no change since previous assessment      Previous Nutrition Diagnosis:   [x ] Mild malnutrition   Nutrition Diagnosis is [ x] ongoing  New Nutrition Diagnosis: [x ] not applicable    Education:  [  ] Given today        Type of education provided:  [  ] Given on previous assessment by RD  [  ] Not applicable 2/2 cognitive deficit  [  ] Pt refusal of education offered  [  ] Not applicable 2/2 current prognosis  [ x ] Not warranted at present, patient is not interested    Interventions:   Recommend  [x ] Continue with current diet.   [x ] Nutrition Department continue to provide Magic Cup 1x/day (290kcal, 9gm protein) for nutrient support.   [x ] Encourage PO intake and honor food preferences as able.      Monitoring and Evaluation:   [x ] PO intake [x ] Tolerance to diet prescription [x ] weights [ x] follow up per protocol  [x ] other: bowel movement, skin integrity, labs.

## 2023-06-30 NOTE — H&P ADULT - HISTORY OF PRESENT ILLNESS
Message left letting patient know trulicity was approved.    71 yo F, w/ PMH of emphysema, CAD, HTN, revision USAMA with ORIF for periprosthetic R femur fracture on 6/30/18 by Dr. Be, discharged on 7/3/18 to [facility] rehab, readmitted from 07/16 - 07/24/18 w/ worsening R hip pain and decreased ability to ambulate, now s/p right femur [place] B1 periprosthetic fracture ORIF by Dr. Cornell on 07/19/18, BIBEMS s/p mechanical fall on stairs during the night c/o R hip and R leg pain, redness, swelling, stiffness. Patient denies other complaints. Denies headache, neck stiffness, fever/chills, vision change, chest pain, shortness of breath, difficulty breathing, palpitations, weakness, dizziness, nausea, vomiting, diarrhea, syncope.   From prior, note, patient had initial right hip fracture surgery at [facility] with a hip pin placement in 2015.  She developed avascular necrosis, necessitating a right total hip replacement at [facility] in 2017.  She was well for one year and had an accidental fall taking out her garbage June, 2018. She was then diagnosed with a periprosthetic fracture. Repair consisted of lengthening the right total hip replacement and then stabilization with the distal femur.  At rehabilitation, she had a nontraumatic separation of this distal stabilization which required a periprosthetic revision. Patients/p revision of right hip surgery as of 07/19/18.

## 2023-07-05 NOTE — PROGRESS NOTE ADULT - ASSESSMENT
73F s/p stage 1 revision R USAMA w/ Dr. Be 4/23/19 sent to ED for admission for stage 2 revision procedure on 9/14. Reports continued R hip pain. Off iv abx. Negative aspiration cx in office. No recent trauma or falls. Saw Dr. Be in the office today. No fevers or chills. Patient denies radiation of pain. Patient denies numbness/tingling/burning in the RLE. Has been ambulatory with a 4 point cane. No other complaints at this time.  Patient had a normal echo and stress test in the  past week Headache

## 2023-07-19 NOTE — CONSULT NOTE ADULT - PROBLEM SELECTOR RECOMMENDATION 4
PPSV 50%  Pt needs assistance with most ALDs  As per pt non-ambulatory at home, wheelchair bound  PT recs appreciated   Skin care per nursing protocol Mucosal Advancement Flap Text: Given the location of the defect, shape of the defect and the proximity to free margins a mucosal advancement flap was deemed most appropriate. Incisions were made with a 15 blade scalpel in the appropriate fashion along the cutaneous vermilion border and the mucosal lip. The remaining actinically damaged mucosal tissue was excised.  The mucosal advancement flap was then elevated to the gingival sulcus with care taken to preserve the neurovascular structures and advanced into the primary defect. Care was taken to ensure that precise realignment of the vermilion border was achieved.

## 2023-08-08 NOTE — CONSULT NOTE ADULT - SUBJECTIVE AND OBJECTIVE BOX
CHIEF COMPLAINT:Patient is a 73y old  Female who presents with a chief complaint of right hip pain (13 Sep 2019 06:41)      HISTORY OF PRESENT ILLNESS:    73 female with history as below seizure disorder (not on medications), EtOH abuse, CAD w/ stents (last stent 10 years ago), Aortic Stenosis, R THR (2/2 to right hip fracture) course c/b periprostetic fracture in July 2018 with ORIF planned for stage 2 revision  cardiology is called for eval and treatment of HTN     PAST MEDICAL & SURGICAL HISTORY:  Pain of right hip joint  Migraine  Alcohol abuse  Seizure  Stented coronary artery  Coronary artery disease  Anxiety  HTN (hypertension)  Emphysema, unspecified  Anxiety  Migraine  CAD (coronary artery disease)  Hyperlipidemia  Hypertension  Status post total hip replacement, right: 5/21/18  S/P bladder repair          MEDICATIONS:  amLODIPine   Tablet 10 milliGRAM(s) Oral daily  cloNIDine 0.1 milliGRAM(s) Oral two times a day  heparin  Injectable 5000 Unit(s) SubCutaneous every 12 hours  lisinopril 10 milliGRAM(s) Oral daily  metoprolol tartrate 25 milliGRAM(s) Oral two times a day  NIFEdipine XL 60 milliGRAM(s) Oral daily      ALBUTerol    90 MICROgram(s) HFA Inhaler 2 Puff(s) Inhalation every 6 hours  tiotropium 18 MICROgram(s) Capsule 1 Capsule(s) Inhalation daily    diazepam    Tablet 5 milliGRAM(s) Oral every 8 hours PRN  HYDROmorphone   Tablet 2 milliGRAM(s) Oral every 3 hours PRN  HYDROmorphone   Tablet 4 milliGRAM(s) Oral every 3 hours PRN  melatonin 3 milliGRAM(s) Oral at bedtime PRN  metoclopramide Injectable 5 milliGRAM(s) IV Push every 6 hours PRN  morphine  - Injectable 2 milliGRAM(s) IV Push every 2 hours PRN  morphine ER Tablet 15 milliGRAM(s) Oral every 8 hours    bisacodyl Suppository 10 milliGRAM(s) Rectal daily PRN  docusate sodium 100 milliGRAM(s) Oral three times a day  magnesium hydroxide Suspension 30 milliLiter(s) Oral daily PRN  pantoprazole  Injectable 40 milliGRAM(s) IV Push daily  senna 2 Tablet(s) Oral at bedtime      sodium chloride 0.9%. 1000 milliLiter(s) IV Continuous <Continuous>      FAMILY HISTORY:  Family history of coronary artery disease in daughter (Child)      Non-contributory    SOCIAL HISTORY:    No tobacco, drugs or etoh    Allergies    No Known Allergies    Intolerances    	    REVIEW OF SYSTEMS:  as above  The rest of the 14 points ROS reviewed and except above they are unremarkable.        PHYSICAL EXAM:  T(C): 36.7 (09-13-19 @ 12:07), Max: 36.7 (09-13-19 @ 00:38)  HR: 54 (09-13-19 @ 12:07) (54 - 62)  BP: 131/56 (09-13-19 @ 12:07) (108/58 - 183/69)  RR: 16 (09-13-19 @ 12:07) (16 - 16)  SpO2: 93% (09-13-19 @ 12:07) (93% - 95%)  Wt(kg): --  I&O's Summary    12 Sep 2019 07:01  -  13 Sep 2019 07:00  --------------------------------------------------------  IN: 880 mL / OUT: 750 mL / NET: 130 mL    13 Sep 2019 07:01  -  13 Sep 2019 15:51  --------------------------------------------------------  IN: 900 mL / OUT: 600 mL / NET: 300 mL        JVP: Normal  Neck: supple  Lung: clear   CV: S1 S2 , Murmur:  Abd: soft  Ext: No edema  neuro: Awake / alert  Psych: flat affect  Skin: normal      LABS/DATA:    TELEMETRY: 	    ECG:  	   	  CARDIAC MARKERS:    < from: Transesophageal Echocardiogram (04.22.19 @ 00:59) >  ------------------------------------------------------------------------  Conclusions:  1. Calcified trileaflet aortic valve with decreased  opening. mean transaortic valve gradient equals 16 mm Hg,  estimated aortic valve area equals 1.4 sqcm (by  planimetry), consistent with moderate aortic stenosis.  Mild-moderate aortic regurgitation.  2. Hyperdynamic left ventricular systolic function.  3. Normal right ventricular size and function.  *** No previous Echo exam.  ------------------------------------------------------------------------  Confirmed on  4/22/2019 - 17:45:52 by Aspen Lr M.D.  ------------------------------------------------------------------------    < end of copied text >    < from: Nuclear Stress Test-Pharmacologic (05.16.18 @ 10:58) >  ------------------------------------------------------------------------  IMPRESSIONS:Normal Study  * The left ventricle was normal in size.  * Tracer uptake was homogeneous throughout the left  ventricle.  * Normal study; no evidence for myocardial infarction or  ischemia.  * Gated wall motion analysis was performed, and shows  normal wall motion.  ------------------------------------------------------------------------  Confirmed on  5/16/2018 - 14:02:30 by Ayse Hammond MD  ------------------------------------------------------------------------    < end of copied text >                                  13.6   6.9   )-----------( 268      ( 11 Sep 2019 15:59 )             41.8     09-11    141  |  102  |  26<H>  ----------------------------<  96  4.1   |  25  |  0.97    Ca    9.0      11 Sep 2019 15:59    TPro  7.8  /  Alb  4.2  /  TBili  0.1<L>  /  DBili  x   /  AST  16  /  ALT  12  /  AlkPhos  138<H>  09-11    proBNP:   Lipid Profile:   HgA1c:   TSH:
Patient is a 73y old  Female who presents with a chief complaint of right hip pain (11 Sep 2019 16:40)      HPI:  73F s/p stage 1 revision R USAMA w/ Dr. Be 19 sent to ED for admission for stage 2 revision procedure on . Reports continued R hip pain. Off iv abx. Negative aspiration cx in office. No recent trauma or falls. Saw Dr. Be in the office today. No fevers or chills. Patient denies radiation of pain. Patient denies numbness/tingling/burning in the RLE. Has been ambulatory with a 4 point cane. No other complaints at this time.  Patient had a normal echo and stress test in the  past week  ..  MEDICATIONS  (STANDING):  ALBUTerol    90 MICROgram(s) HFA Inhaler 2 Puff(s) Inhalation every 6 hours  amLODIPine   Tablet 5 milliGRAM(s) Oral daily  cloNIDine 0.1 milliGRAM(s) Oral two times a day  docusate sodium 100 milliGRAM(s) Oral three times a day  heparin  Injectable 5000 Unit(s) SubCutaneous every 12 hours  lisinopril 10 milliGRAM(s) Oral daily  metoprolol tartrate 25 milliGRAM(s) Oral two times a day  nicotine -  14 mG/24Hr(s) Patch 1 patch Transdermal daily  NIFEdipine XL 60 milliGRAM(s) Oral daily  pantoprazole  Injectable 40 milliGRAM(s) IV Push daily  senna 2 Tablet(s) Oral at bedtime  tiotropium 18 MICROgram(s) Capsule 1 Capsule(s) Inhalation daily    MEDICATIONS  (PRN):  diazepam    Tablet 5 milliGRAM(s) Oral every 8 hours PRN anxiety  magnesium hydroxide Suspension 30 milliLiter(s) Oral daily PRN Constipation  melatonin 3 milliGRAM(s) Oral at bedtime PRN Insomnia  metoclopramide Injectable 5 milliGRAM(s) IV Push every 6 hours PRN Nausea and/or Vomiting  morphine  - Injectable 1 milliGRAM(s) IV Push every 2 hours PRN Severe Pain (7 - 10)  oxyCODONE    IR 10 milliGRAM(s) Oral every 6 hours PRN Severe Pain (7 - 10)  oxyCODONE    IR 5 milliGRAM(s) Oral every 4 hours PRN Moderate Pain (4 - 6)      Allergies    No Known Allergies    Intolerances      PAST MEDICAL HISTORY:  Pain of right hip joint  Migraine  Alcohol abuse  Seizure  Stented coronary artery  Coronary artery disease  Anxiety  HTN (hypertension)  Emphysema, unspecified  Anxiety  Migraine  CAD (coronary artery disease)  Hyperlipidemia  Hypertension    PAST SURGICAL HISTORY:  Status post total hip replacement, right with revision due to infection   No significant past surgical history  S/P bladder repair      Diet:  (  	 ) Regular   ( x  ) Low Sodium   (   ) Low Cholesterol   (   ) Diabetic   (   ) Other    FAMILY HISTORY:  Family history of coronary artery disease in daughter (Child)      SOCIAL HISTORY:    Substance Use: ( x ) never used  (  ) other:  Tobacco Usage:  (   ) never smoked   ( x  ) former smoker   (   ) current smoker  (   ) pack years  (   ) last cigarette date  Alcohol Usage:  alcoholism  Advanced Directives: (   ) None    (   ) DNR    (   ) DNI    (   ) Health Care Proxy:     REVIEW OF SYSTEM:  CONSTITUTIONAL: No fever, No change in weight, No fatigue  HEAD: No headache, No dizziness, No recent trauma  EYES: No eye pain, No visual disturbances, No discharge  ENT:  No difficulty hearing, No tinnitus, No vertigo, No sinus pain, No throat pain  NECK: No pain, No stiffness  BREASTS: No pain, No masses, No nipple discharge  RESPIRATORY: No cough, No wheezing, No chills, No hemoptysis, No shortness of breath at rest or exertional shortness of breath  CARDIOVASCULAR: No chest pain, No palpitations, No dizziness, No CHF, No arrhythmia, No cardiomegaly, No leg swelling  GASTROINTESTINAL: No abdominal, No epigastric pain. No nausea, No vomiting, No hematemesis, No diarrhea, No constipation. No melena, No hematochezia. No GERD  GENITOURINARY: No dysuria, No frequency, No hematuria, No incontinence, No nocturia, No hesitancy,  SKIN: No itching, No burning, No rashes, No lesions   LYMPH NODES: No history of enlarged glands  ENDOCRINE: No heat or cold intolerance, No hair loss. No osteoporosis, No thyroid disease  MUSCULOSKELETAL:   Right hip pain  PSYCHIATRIC: No depression, No anxiety, No mood swings, No difficulty sleeping  HEME/LYMPH: No easy bruising, No anticoagulants, No bleeding disorder, No bleeding gums  ALLERGY AND IMMUNOLOGIC: No hives, No eczema  NEUROLOGICAL: No memory loss, No loss of strength, No numbness, No tremors    VITALS:  Vital Signs Last 24 Hrs  T(C): 36.7 (11 Sep 2019 23:27), Max: 37 (11 Sep 2019 18:11)  T(F): 98.1 (11 Sep 2019 23:27), Max: 98.6 (11 Sep 2019 18:11)  HR: 77 (11 Sep 2019 23:27) (69 - 99)  BP: 196/83 (12 Sep 2019 00:56) (144/55 - 201/75)  BP(mean): --  RR: 16 (11 Sep 2019 23:27) (16 - 18)  SpO2: 97% (11 Sep 2019 23:27) (96% - 99%)  I&O's Summary    11 Sep 2019 07:01  -  12 Sep 2019 01:07  --------------------------------------------------------  IN: 300 mL / OUT: 800 mL / NET: -500 mL        PHYSICAL EXAM:  GENERAL: NAD, well nourished and conversant  HEAD:  Atraumatic  EYES: EOM, PERRLA, conjunctiva pink and sclera white  ENT: No tonsillar erythema, exudates, or enlargement, moist mucous membranes, good dentition, no lesions  NECK: Supple, No JVD, normal thyroid, carotids with normal upstrokes and no bruits  CHEST/LUNG: Clear to auscultation bilaterally, No rales, rhonchi, wheezing, or rubs  HEART: Regular rate and rhythm, No murmurs, rubs, or gallops  ABDOMEN: Soft, nondistended, no masses, guarding, tenderness or rebound, bowel sounds present  EXTREMITIES:  2+ Peripheral Pulses, No clubbing, cyanosis, or edema.  Right hia ains  LYMPH: No lymphadenopathy noted  SKIN: No rashes or lesions  NERVOUS SYSTEM:  Alert & Oriented X3, normal cognitive function. Motor Strength 5/5 right upper and right lower.  5/5 left upper and left lower extremities, DTRs 2+ intact and symmetric    LABS:    EKG nsr  no acue ch naes    CBC Full  -  ( 11 Sep 2019 15:59 )  WBC Count : 6.9 K/uL  RBC Count : 4.49 M/uL  Hemoglobin : 13.6 g/dL  Hematocrit : 41.8 %  Platelet Count - Automated : 268 K/uL  Mean Cell Volume : 93.1 fl  Mean Cell Hemoglobin : 30.2 pg  Mean Cell Hemoglobin Concentration : 32.5 gm/dL  Auto Neutrophil # : 5.4 K/uL  Auto Lymphocyte # : 1.0 K/uL  Auto Monocyte # : 0.4 K/uL  Auto Eosinophil # : 0.1 K/uL  Auto Basophil # : 0.0 K/uL  Auto Neutrophil % : 77.5 %  Auto Lymphocyte % : 15.1 %  Auto Monocyte % : 6.1 %  Auto Eosinophil % : 1.0 %  Auto Basophil % : 0.3 %        141  |  102  |  26<H>  ----------------------------<  96  4.1   |  25  |  0.97    Ca    9.0      11 Sep 2019 15:59    TPro  7.8  /  Alb  4.2  /  TBili  0.1<L>  /  DBili  x   /  AST  16  /  ALT  12  /  AlkPhos  138<H>      LIVER FUNCTIONS - ( 11 Sep 2019 15:59 )  Alb: 4.2 g/dL / Pro: 7.8 g/dL / ALK PHOS: 138 U/L / ALT: 12 U/L / AST: 16 U/L / GGT: x           PT/INR - ( 11 Sep 2019 15:59 )   PT: 11.0 sec;   INR: 0.97 ratio         PTT - ( 11 Sep 2019 15:59 )  PTT:25.8 sec  Urinalysis Basic - ( 11 Sep 2019 16:14 )    Color: Yellow / Appearance: Clear / S.034 / pH: x  Gluc: x / Ketone: Negative  / Bili: Negative / Urobili: 2 mg/dL   Blood: x / Protein: 100 mg/dL / Nitrite: Negative   Leuk Esterase: Small / RBC: 2 /hpf / WBC 15 /HPF   Sq Epi: x / Non Sq Epi: 5 / Bacteria: Negative      CAPILLARY BLOOD GLUCOSE          RADIOLOGY & ADDITIONAL TESTS:    Consultant(s):    Care Discussed with Consultants/Other Providers [ ] YES  [ ] NO
Patient is a 73y old  Female who presents with a chief complaint of right hip pain (18 Sep 2019 07:45)    HPI:  73F s/p stage 1 revision R USAMA w/ Dr. Be 4/23/19 sent to ED for admission for stage 2 revision procedure on 9/14. Reports continued R hip pain. Off iv abx. Negative aspiration cx in office. No recent trauma or falls. Saw Dr. Be in the office today. No fevers or chills. Patient denies radiation of pain. Patient denies numbness/tingling/burning in the RLE. Has been ambulatory with a 4 point cane. No other complaints at this time. Saw her outpatient cardiology Dr. Tera Romero last week. Had stress test 3 days ago. Recent echo as well. (11 Sep 2019 16:40)      PAST MEDICAL & SURGICAL HISTORY:  Pain of right hip joint  Migraine  Alcohol abuse  Seizure  Stented coronary artery  Coronary artery disease  Anxiety  HTN (hypertension)  Emphysema, unspecified  Anxiety  Migraine  CAD (coronary artery disease)  Hyperlipidemia  Hypertension  Status post total hip replacement, right: 5/21/18  S/P bladder repair      Social history:    FAMILY HISTORY:  Family history of coronary artery disease in daughter (Child)          Allergic/Immunologic:	No hives or rash   Allergies    No Known Allergies    Intolerances        Antimicrobials:    vancomycin  IVPB 500 milliGRAM(s) IV Intermittent every 24 hours        Vital Signs Last 24 Hrs  T(C): 36.7 (18 Sep 2019 08:48), Max: 36.8 (17 Sep 2019 12:30)  T(F): 98.1 (18 Sep 2019 08:48), Max: 98.2 (17 Sep 2019 12:30)  HR: 56 (18 Sep 2019 08:48) (55 - 66)  BP: 115/61 (18 Sep 2019 08:48) (100/51 - 139/57)  BP(mean): --  RR: 18 (18 Sep 2019 08:48) (18 - 18)           and alert  No cachexia     Eyes:PERRL EOMI.NO discharge or conjunctival injection    ENMT:No sinus tenderness.No thrush.No pharyngeal exudate or erythema.Fair dental hygiene    Neck:Supple,No LN,no JVD         Gastrointestinal:Soft BS(+) no tenderness no masses ,No rebound or guarding    Genitourinary:No CVA tendereness     Rectal:    Extremities: clean          Neurological:AAO X 3,No grossly focal deficits    Skin:No rash     Lymph Nodes:No palpable LNs    Musculoskeletal:No joint swelling or LOM    Psychiatric:Affect normal.                        RECENT CULTURES:  09-15 @ 04:46  .Tissue  --  --  --    No growth  --  09-12 @ 01:26  .Urine  --  --  --    <10,000 CFU/mL Normal Urogenital Mone  --  09-11 @ 21:42  .Blood  --  --  --    No growth at 5 days.  --  09-11 @ 19:15  .Blood  --  --  --    No growth at 5 days.  --      MICROBIOLOGY:  Culture Results:   No growth (09-15 @ 04:46)  Culture Results:   <10,000 CFU/mL Normal Urogenital Mone (09-12 @ 01:26)  Culture Results:   No growth at 5 days. (09-11 @ 21:42)  Culture Results:   No growth at 5 days. (09-11 @ 19:15)          Radiology:      Assessment:        Recommendations and Plan:    Pager [phone]  After 5 pm/weekends or if no response :[phone]
no

## 2023-08-22 NOTE — ED PROVIDER NOTE - CPE EDP ENMT NORM
Patient was exposed to head lice by step sister and now has lice. Patient's parent is requesting shampoo be prescribed to get rid of lice. Pharmacy. ..   [address] [facility] [phone] - [address] normal...

## 2023-08-23 ENCOUNTER — INPATIENT (INPATIENT)
Facility: HOSPITAL | Age: 78
LOS: 4 days | Discharge: HOME | DRG: 300 | End: 2023-08-28
Attending: INTERNAL MEDICINE | Admitting: INTERNAL MEDICINE
Payer: MEDICARE

## 2023-08-23 VITALS
SYSTOLIC BLOOD PRESSURE: 150 MMHG | TEMPERATURE: 98 F | RESPIRATION RATE: 18 BRPM | OXYGEN SATURATION: 91 % | HEIGHT: 62 IN | HEART RATE: 86 BPM | DIASTOLIC BLOOD PRESSURE: 83 MMHG | WEIGHT: 125 LBS

## 2023-08-23 DIAGNOSIS — Z98.89 OTHER SPECIFIED POSTPROCEDURAL STATES: Chronic | ICD-10-CM

## 2023-08-23 DIAGNOSIS — Z98.890 OTHER SPECIFIED POSTPROCEDURAL STATES: Chronic | ICD-10-CM

## 2023-08-23 DIAGNOSIS — I82.409 ACUTE EMBOLISM AND THROMBOSIS OF UNSPECIFIED DEEP VEINS OF UNSPECIFIED LOWER EXTREMITY: ICD-10-CM

## 2023-08-23 DIAGNOSIS — Z96.641 PRESENCE OF RIGHT ARTIFICIAL HIP JOINT: Chronic | ICD-10-CM

## 2023-08-23 LAB
ALBUMIN SERPL ELPH-MCNC: 4.2 G/DL — SIGNIFICANT CHANGE UP (ref 3.3–5)
ALP SERPL-CCNC: 105 U/L — SIGNIFICANT CHANGE UP (ref 40–120)
ALT FLD-CCNC: 18 U/L — SIGNIFICANT CHANGE UP (ref 10–45)
ANION GAP SERPL CALC-SCNC: 14 MMOL/L — SIGNIFICANT CHANGE UP (ref 5–17)
APTT BLD: 30.5 SEC — SIGNIFICANT CHANGE UP (ref 24.5–35.6)
AST SERPL-CCNC: 25 U/L — SIGNIFICANT CHANGE UP (ref 10–40)
BASOPHILS # BLD AUTO: 0.03 K/UL — SIGNIFICANT CHANGE UP (ref 0–0.2)
BASOPHILS NFR BLD AUTO: 0.5 % — SIGNIFICANT CHANGE UP (ref 0–2)
BILIRUB SERPL-MCNC: 0.4 MG/DL — SIGNIFICANT CHANGE UP (ref 0.2–1.2)
BUN SERPL-MCNC: 41 MG/DL — HIGH (ref 7–23)
CALCIUM SERPL-MCNC: 9.5 MG/DL — SIGNIFICANT CHANGE UP (ref 8.4–10.5)
CHLORIDE SERPL-SCNC: 99 MMOL/L — SIGNIFICANT CHANGE UP (ref 96–108)
CO2 SERPL-SCNC: 25 MMOL/L — SIGNIFICANT CHANGE UP (ref 22–31)
CREAT SERPL-MCNC: 0.79 MG/DL — SIGNIFICANT CHANGE UP (ref 0.5–1.3)
EGFR: 78 ML/MIN/1.73M2 — SIGNIFICANT CHANGE UP
EOSINOPHIL # BLD AUTO: 0.11 K/UL — SIGNIFICANT CHANGE UP (ref 0–0.5)
EOSINOPHIL NFR BLD AUTO: 1.7 % — SIGNIFICANT CHANGE UP (ref 0–6)
GLUCOSE SERPL-MCNC: 98 MG/DL — SIGNIFICANT CHANGE UP (ref 70–99)
HCT VFR BLD CALC: 46.7 % — HIGH (ref 34.5–45)
HGB BLD-MCNC: 14.5 G/DL — SIGNIFICANT CHANGE UP (ref 11.5–15.5)
IMM GRANULOCYTES NFR BLD AUTO: 0.5 % — SIGNIFICANT CHANGE UP (ref 0–0.9)
INR BLD: 1.01 RATIO — SIGNIFICANT CHANGE UP (ref 0.85–1.18)
LYMPHOCYTES # BLD AUTO: 1.08 K/UL — SIGNIFICANT CHANGE UP (ref 1–3.3)
LYMPHOCYTES # BLD AUTO: 16.4 % — SIGNIFICANT CHANGE UP (ref 13–44)
MCHC RBC-ENTMCNC: 28.9 PG — SIGNIFICANT CHANGE UP (ref 27–34)
MCHC RBC-ENTMCNC: 31 GM/DL — LOW (ref 32–36)
MCV RBC AUTO: 93.2 FL — SIGNIFICANT CHANGE UP (ref 80–100)
MONOCYTES # BLD AUTO: 0.48 K/UL — SIGNIFICANT CHANGE UP (ref 0–0.9)
MONOCYTES NFR BLD AUTO: 7.3 % — SIGNIFICANT CHANGE UP (ref 2–14)
NEUTROPHILS # BLD AUTO: 4.85 K/UL — SIGNIFICANT CHANGE UP (ref 1.8–7.4)
NEUTROPHILS NFR BLD AUTO: 73.6 % — SIGNIFICANT CHANGE UP (ref 43–77)
NRBC # BLD: 0 /100 WBCS — SIGNIFICANT CHANGE UP (ref 0–0)
NT-PROBNP SERPL-SCNC: 734 PG/ML — HIGH (ref 0–300)
PLATELET # BLD AUTO: 303 K/UL — SIGNIFICANT CHANGE UP (ref 150–400)
POTASSIUM SERPL-MCNC: 4.1 MMOL/L — SIGNIFICANT CHANGE UP (ref 3.5–5.3)
POTASSIUM SERPL-SCNC: 4.1 MMOL/L — SIGNIFICANT CHANGE UP (ref 3.5–5.3)
PROT SERPL-MCNC: 8.5 G/DL — HIGH (ref 6–8.3)
PROTHROM AB SERPL-ACNC: 11.1 SEC — SIGNIFICANT CHANGE UP (ref 9.5–13)
RBC # BLD: 5.01 M/UL — SIGNIFICANT CHANGE UP (ref 3.8–5.2)
RBC # FLD: 16.9 % — HIGH (ref 10.3–14.5)
SODIUM SERPL-SCNC: 138 MMOL/L — SIGNIFICANT CHANGE UP (ref 135–145)
TROPONIN T, HIGH SENSITIVITY RESULT: 21 NG/L — SIGNIFICANT CHANGE UP (ref 0–51)
WBC # BLD: 6.58 K/UL — SIGNIFICANT CHANGE UP (ref 3.8–10.5)
WBC # FLD AUTO: 6.58 K/UL — SIGNIFICANT CHANGE UP (ref 3.8–10.5)

## 2023-08-23 PROCEDURE — 99285 EMERGENCY DEPT VISIT HI MDM: CPT

## 2023-08-23 PROCEDURE — 99223 1ST HOSP IP/OBS HIGH 75: CPT

## 2023-08-23 RX ORDER — OXYCODONE HYDROCHLORIDE 5 MG/1
5 TABLET ORAL ONCE
Refills: 0 | Status: DISCONTINUED | OUTPATIENT
Start: 2023-08-23 | End: 2023-08-23

## 2023-08-23 RX ORDER — METOCLOPRAMIDE HCL 10 MG
10 TABLET ORAL ONCE
Refills: 0 | Status: COMPLETED | OUTPATIENT
Start: 2023-08-23 | End: 2023-08-23

## 2023-08-23 RX ORDER — SODIUM CHLORIDE 9 MG/ML
1000 INJECTION INTRAMUSCULAR; INTRAVENOUS; SUBCUTANEOUS ONCE
Refills: 0 | Status: COMPLETED | OUTPATIENT
Start: 2023-08-23 | End: 2023-08-23

## 2023-08-23 RX ADMIN — SODIUM CHLORIDE 1000 MILLILITER(S): 9 INJECTION INTRAMUSCULAR; INTRAVENOUS; SUBCUTANEOUS at 22:25

## 2023-08-23 RX ADMIN — OXYCODONE HYDROCHLORIDE 5 MILLIGRAM(S): 5 TABLET ORAL at 22:17

## 2023-08-23 RX ADMIN — Medication 10 MILLIGRAM(S): at 22:45

## 2023-08-23 NOTE — ED PROVIDER NOTE - RAPID ASSESSMENT
77-year-old female history EtOH abuse, daily smoker, CAD, emphysema, HTN, HLD, MRSA bacteremia, seizure disorder brought in by ambulance from outpatient office Dr. Lawrence to be admitted for multiple findings which include RLE DVT, positive stress test with ischemic changes and bilateral carotid artery stenosis.  Patient reports feeling unwell with generalized weakness, worsening SOB, rash that began when she went out in the sun.  Patient reports she has been on doxycycline chronically since hip infection.  Patient reports she had a CT chest today does not know results.  Denies fever, chills, dizziness, palpitations, LOC.    chronically ill appearing female RLE swelling/erythema no respiratory distress  ***pt TBA Abrudescu per note from Dr Rosario consult vascular     I, Papa Perkins PA-C saw patient as a rapid assessment initially via telemedicine encounter. The rest of care to be performed by the primary ED team. Receiving team will follow up on labs, analgesia, any clinical imaging, and perform reassessment and disposition of the patient as clinically indicated. All decisions regarding the progression of care will be made at their discretion.

## 2023-08-23 NOTE — ED PROVIDER NOTE - NS ED ROS FT
Constitutional: VS reviewed. Alert and orientedx3, well appearing, no apparent distress  HEENT: Atraumatic, EOMI  CV: RRR  Lungs: Tachypneic. Clear and equal bilaterally, no wheezes, rales or crackles  Abdomen: Soft, nondistended, nontender  MSK: No deformities. + TTP of b/l lower extremities.   Skin: Warm and dry. Erythema of b/l anterior lower extremities   Neuro: Appears nonfocal  Lymph: No pitting edema in extremities.

## 2023-08-23 NOTE — H&P ADULT - ASSESSMENT
77y F pmh EtOH use disorder, CAD, emphysema, HTN, HLD, seizure disorder sent to ED for DVT and positive stress test, needing further eval

## 2023-08-23 NOTE — H&P ADULT - PROBLEM SELECTOR PLAN 3
- hx of diastolic HF w/ mild pulm HTN, on lasix 40mg at home  -Echo (4/22): EF 75% w/ stage 2 diastolic function  - IV lasix

## 2023-08-23 NOTE — H&P ADULT - PROBLEM SELECTOR PLAN 6
- Hx/o Rt hip joint OM, s/p MRSA bacteremia   - Eval by orhto in past, not surgical candidate   - On chronic abx, c/w Doxycycline

## 2023-08-23 NOTE — ED ADULT TRIAGE NOTE - CHIEF COMPLAINT QUOTE
confirmed DVT and b/l carotid artery stenosis sent in by Dr. Lawrence to be admitted to Dr. Wooten. hx of COPD

## 2023-08-23 NOTE — ED ADULT NURSE REASSESSMENT NOTE - NS ED NURSE REASSESS COMMENT FT1
pt is refusing to change into a gown, doesn't want cardiac monitor leads placed on chest. Pt was educated on the purpose of having the cardiac monitor and the benefits, pt responds "I don't care, I don't want it". MD Ladd made aware. Pt is currently eating a sandwich.

## 2023-08-23 NOTE — ED PROVIDER NOTE - ATTENDING CONTRIBUTION TO CARE
77-year-old female history EtOH abuse, daily smoker, CAD, emphysema, HTN, HLD, MRSA bacteremia, seizure disorder brought in by ambulance from outpatient office Dr. Lawrence to be admitted for multiple findings which include RLE DVT, positive stress test with ischemic changes and bilateral carotid artery stenosis.  Patient states she just went to see Dr. Alvarez for her migraines and already got a prescription and does not understand why she required such a work-up patient is a poor historian.

## 2023-08-23 NOTE — H&P ADULT - PROBLEM SELECTOR PLAN 2
- s/p ANDREA/PVR:  Moderate b/l LE arterial flow limitation localizing to the popliteal and infrapopliteal levels  - C/w Xarelto  - ASA, Statin   - Vascular consult to be called in AM

## 2023-08-23 NOTE — ED ADULT NURSE NOTE - OBJECTIVE STATEMENT
76YO female with PMH of ETOH abuse, Daily smoker, CAD, Emphysema, HTN, HLD, MRSA, seizure disorder presenting with complaints of abnormal labs. As per pt she was referred to the ED by MD Lawrence to be admitted for mx finding: RLE DVT, psotive stress test with EKG changes. Pt c/o generalized unwellness, states taking doxy due to a hip infection, has noticed B/L UE redness "rash" primary on right arm, pt c.o "hole" on her right shin. and worsening SOB. Pt Axox4 respirations even, & labored while talking. radial pulses strong and equal bilaterally. Skin is red on UE, primary on right arm, with ecchymosis on b/l forearms, pt has . Pt placed in position of comfort. Pt educated on call bell system and provided call bell. Bed in lowest position, wheels locked, appropriate side rails raised. Pt denies needs at this time. 76YO female with PMH of ETOH abuse, Daily smoker, CAD, Emphysema, HTN, HLD, MRSA, seizure disorder presenting with complaints of abnormal labs. As per pt she was referred to the ED by MD Lawrence to be admitted for mx finding: RLE DVT, psotive stress test with EKG changes. Pt c/o generalized unwellness, states taking doxy due to a hip infection, has noticed B/L UE redness "rash" primary on right arm, pt c/o "hole" on her right shin. and worsening SOB. Pt Axox4 respirations even, & labored while talking. radial pulses strong and equal bilaterally. Skin is red on UE, primary on right arm, with ecchymosis on b/l forearms, pt has an indent on right shin. Pt denies CP, fevers, nausea or vomiting. Pt placed in position of comfort. Bed in lowest position, wheels locked, appropriate side rails raised. Pt denies needs at this time. 76YO female with PMH of ETOH abuse, Daily smoker, CAD, Emphysema, HTN, HLD, MRSA, seizure disorder presenting with complaints of abnormal labs. As per pt she was referred to the ED by MD Lawrence to be admitted for mx finding: RLE DVT, psotive stress test with EKG changes. Pt c/o generalized unwellness, states taking doxy due to a hip infection, has noticed B/L UE redness "rash" primary on right arm, pt c/o "hole" on her right shin. and worsening SOB. Pt Axox4 respirations even, & labored while talking. radial pulses strong and equal bilaterally. Skin is red on UE, primary on right arm, with ecchymosis on b/l forearms, pt has an indent on b/l shins, and b/l LE swelling. Pt denies CP, fevers, nausea or vomiting. Pt placed in position of comfort. Bed in lowest position, wheels locked, appropriate side rails raised. Pt denies needs at this time.

## 2023-08-23 NOTE — H&P ADULT - NSHPLABSRESULTS_GEN_ALL_CORE
14.5   6.58  )-----------( 303      ( 23 Aug 2023 19:21 )             46.7       08-23    138  |  99  |  41<H>  ----------------------------<  98  4.1   |  25  |  0.79    Ca    9.5      23 Aug 2023 19:21    TPro  8.5<H>  /  Alb  4.2  /  TBili  0.4  /  DBili  x   /  AST  25  /  ALT  18  /  AlkPhos  105  08-23      Troponin T, High Sensitivity Result: 21: (08.23.23 @ 19:21)    Pro-Brain Natriuretic Peptide: 734 pg/mL (08.23.23 @ 19:21)

## 2023-08-23 NOTE — ED PROVIDER NOTE - OBJECTIVE STATEMENT
77-year-old female with past medical history of EtOH use disorder, CAD, emphysema, HTN, HLD, seizure disorder presents emergency department after being sent in by Dr. Rosario for DVT and positive stress test.  Patient is coming in complaining of generally feeling unwell and pain to the right lower extremity.  Patient also endorsing sunburn causing pain on bilateral lower extremities.  Patient denies fevers, chills, vision changes, chest pain, abdominal pain, nausea/vomiting, diarrhea/constipation, dysuria, hematuria.  Patient also complaining of headache.

## 2023-08-23 NOTE — ED ADULT NURSE REASSESSMENT NOTE - NS ED NURSE REASSESS COMMENT FT1
Report received from Jodi Melara RN. Pt resting comfortably in stretcher. A&Ox4. NAD noted. Patient refusing vital signs at this time. NSR 80s on CM. Pending inpatient telemetry bed assignment.  Safety and comfort measures maintained.

## 2023-08-23 NOTE — H&P ADULT - PROBLEM SELECTOR PLAN 1
- Hx/o DVT on Xarelto  - Sent in by O/P doctor for eval LE DVT i/s/o leg swelling , redness  - f/u LE doppler   - c/w Xarelto  - previously declined operation in past

## 2023-08-23 NOTE — H&P ADULT - HISTORY OF PRESENT ILLNESS
77y F pmh EtOH use disorder, CAD, emphysema, HTN, HLD, seizure disorder presents emergency department after being sent in by Dr. Rosario for DVT and positive stress test.  Patient c/o generally feeling of malaise, pain to the right lower extremity, sunburn causing pain on b/L lower extremities, HA.     ROS: Denies CP, SOB, palpitation, N/V/D, fever, cough, chills, dizziness, abm pain, recent travel, sick contact     A 10-system ROS was performed and is negative except as noted above and/or in the HPI.

## 2023-08-23 NOTE — H&P ADULT - NSHPPHYSICALEXAM_GEN_ALL_CORE
T(C): 36.7 (08-24-23 @ 00:29), Max: 36.7 (08-24-23 @ 00:29)  HR: 79 (08-24-23 @ 00:29) (79 - 86)  BP: 163/75 (08-24-23 @ 00:29) (150/83 - 163/75)  RR: 18 (08-24-23 @ 00:29) (18 - 18)  SpO2: 92% (08-24-23 @ 00:29) (91% - 92%)    CONSTITUTIONAL: chronically ill appearing, no apparent distress  EYES: PERRLA and symmetric, EOMI  ENMT: MMM  RESP: No respiratory distress,  diffuse wheezing ,   CV: +S1S2, RRR,  2+ LE edema   GI: Soft, NTND, no RGR; no palpable masses  MSK: normal pain free ROM x4 extremities   SKIN: red irritated LE R>L , red "sunburned" arms  NEURO: CN II-XII grossly intact  PSYCH:  A+O x 3

## 2023-08-24 DIAGNOSIS — I25.10 ATHEROSCLEROTIC HEART DISEASE OF NATIVE CORONARY ARTERY WITHOUT ANGINA PECTORIS: ICD-10-CM

## 2023-08-24 DIAGNOSIS — J43.9 EMPHYSEMA, UNSPECIFIED: ICD-10-CM

## 2023-08-24 DIAGNOSIS — T84.84XA PAIN DUE TO INTERNAL ORTHOPEDIC PROSTHETIC DEVICES, IMPLANTS AND GRAFTS, INITIAL ENCOUNTER: ICD-10-CM

## 2023-08-24 DIAGNOSIS — I82.409 ACUTE EMBOLISM AND THROMBOSIS OF UNSPECIFIED DEEP VEINS OF UNSPECIFIED LOWER EXTREMITY: ICD-10-CM

## 2023-08-24 DIAGNOSIS — I10 ESSENTIAL (PRIMARY) HYPERTENSION: ICD-10-CM

## 2023-08-24 DIAGNOSIS — I50.32 CHRONIC DIASTOLIC (CONGESTIVE) HEART FAILURE: ICD-10-CM

## 2023-08-24 DIAGNOSIS — I73.9 PERIPHERAL VASCULAR DISEASE, UNSPECIFIED: ICD-10-CM

## 2023-08-24 DIAGNOSIS — I80.229 PHLEBITIS AND THROMBOPHLEBITIS OF UNSPECIFIED POPLITEAL VEIN: ICD-10-CM

## 2023-08-24 PROCEDURE — 99152 MOD SED SAME PHYS/QHP 5/>YRS: CPT

## 2023-08-24 PROCEDURE — 93880 EXTRACRANIAL BILAT STUDY: CPT | Mod: 26

## 2023-08-24 PROCEDURE — 99223 1ST HOSP IP/OBS HIGH 75: CPT

## 2023-08-24 PROCEDURE — 93454 CORONARY ARTERY ANGIO S&I: CPT | Mod: 26

## 2023-08-24 PROCEDURE — 93971 EXTREMITY STUDY: CPT | Mod: 26,RT

## 2023-08-24 PROCEDURE — 93971 EXTREMITY STUDY: CPT | Mod: 26,77,RT

## 2023-08-24 RX ORDER — RIVAROXABAN 15 MG-20MG
20 KIT ORAL DAILY
Refills: 0 | Status: DISCONTINUED | OUTPATIENT
Start: 2023-08-24 | End: 2023-08-28

## 2023-08-24 RX ORDER — MIRTAZAPINE 45 MG/1
7.5 TABLET, ORALLY DISINTEGRATING ORAL AT BEDTIME
Refills: 0 | Status: DISCONTINUED | OUTPATIENT
Start: 2023-08-24 | End: 2023-08-28

## 2023-08-24 RX ORDER — MORPHINE SULFATE 50 MG/1
2 CAPSULE, EXTENDED RELEASE ORAL ONCE
Refills: 0 | Status: DISCONTINUED | OUTPATIENT
Start: 2023-08-24 | End: 2023-08-24

## 2023-08-24 RX ORDER — ZOLPIDEM TARTRATE 10 MG/1
5 TABLET ORAL AT BEDTIME
Refills: 0 | Status: DISCONTINUED | OUTPATIENT
Start: 2023-08-24 | End: 2023-08-28

## 2023-08-24 RX ORDER — ATORVASTATIN CALCIUM 80 MG/1
20 TABLET, FILM COATED ORAL AT BEDTIME
Refills: 0 | Status: DISCONTINUED | OUTPATIENT
Start: 2023-08-24 | End: 2023-08-28

## 2023-08-24 RX ORDER — IPRATROPIUM/ALBUTEROL SULFATE 18-103MCG
3 AEROSOL WITH ADAPTER (GRAM) INHALATION EVERY 6 HOURS
Refills: 0 | Status: DISCONTINUED | OUTPATIENT
Start: 2023-08-24 | End: 2023-08-28

## 2023-08-24 RX ORDER — RIVAROXABAN 15 MG-20MG
20 KIT ORAL
Refills: 0 | Status: DISCONTINUED | OUTPATIENT
Start: 2023-08-24 | End: 2023-08-24

## 2023-08-24 RX ORDER — NICOTINE POLACRILEX 2 MG
1 GUM BUCCAL DAILY
Refills: 0 | Status: DISCONTINUED | OUTPATIENT
Start: 2023-08-24 | End: 2023-08-28

## 2023-08-24 RX ORDER — GABAPENTIN 400 MG/1
100 CAPSULE ORAL EVERY 8 HOURS
Refills: 0 | Status: DISCONTINUED | OUTPATIENT
Start: 2023-08-24 | End: 2023-08-28

## 2023-08-24 RX ORDER — CLONAZEPAM 1 MG
0.5 TABLET ORAL EVERY 12 HOURS
Refills: 0 | Status: DISCONTINUED | OUTPATIENT
Start: 2023-08-24 | End: 2023-08-28

## 2023-08-24 RX ORDER — OXYCODONE HYDROCHLORIDE 5 MG/1
10 TABLET ORAL EVERY 4 HOURS
Refills: 0 | Status: DISCONTINUED | OUTPATIENT
Start: 2023-08-24 | End: 2023-08-24

## 2023-08-24 RX ORDER — LOSARTAN POTASSIUM 100 MG/1
25 TABLET, FILM COATED ORAL DAILY
Refills: 0 | Status: DISCONTINUED | OUTPATIENT
Start: 2023-08-24 | End: 2023-08-28

## 2023-08-24 RX ORDER — FUROSEMIDE 40 MG
40 TABLET ORAL DAILY
Refills: 0 | Status: DISCONTINUED | OUTPATIENT
Start: 2023-08-24 | End: 2023-08-24

## 2023-08-24 RX ORDER — LANOLIN ALCOHOL/MO/W.PET/CERES
3 CREAM (GRAM) TOPICAL AT BEDTIME
Refills: 0 | Status: DISCONTINUED | OUTPATIENT
Start: 2023-08-24 | End: 2023-08-28

## 2023-08-24 RX ORDER — NIFEDIPINE 30 MG
60 TABLET, EXTENDED RELEASE 24 HR ORAL DAILY
Refills: 0 | Status: DISCONTINUED | OUTPATIENT
Start: 2023-08-24 | End: 2023-08-28

## 2023-08-24 RX ORDER — ASPIRIN/CALCIUM CARB/MAGNESIUM 324 MG
81 TABLET ORAL DAILY
Refills: 0 | Status: DISCONTINUED | OUTPATIENT
Start: 2023-08-24 | End: 2023-08-28

## 2023-08-24 RX ORDER — ONDANSETRON 8 MG/1
4 TABLET, FILM COATED ORAL EVERY 8 HOURS
Refills: 0 | Status: DISCONTINUED | OUTPATIENT
Start: 2023-08-24 | End: 2023-08-28

## 2023-08-24 RX ORDER — OXYCODONE HYDROCHLORIDE 5 MG/1
5 TABLET ORAL EVERY 4 HOURS
Refills: 0 | Status: DISCONTINUED | OUTPATIENT
Start: 2023-08-24 | End: 2023-08-24

## 2023-08-24 RX ORDER — SENNA PLUS 8.6 MG/1
2 TABLET ORAL AT BEDTIME
Refills: 0 | Status: DISCONTINUED | OUTPATIENT
Start: 2023-08-24 | End: 2023-08-28

## 2023-08-24 RX ORDER — ALBUTEROL 90 UG/1
2 AEROSOL, METERED ORAL EVERY 6 HOURS
Refills: 0 | Status: DISCONTINUED | OUTPATIENT
Start: 2023-08-24 | End: 2023-08-28

## 2023-08-24 RX ORDER — ACETAMINOPHEN 500 MG
650 TABLET ORAL EVERY 6 HOURS
Refills: 0 | Status: DISCONTINUED | OUTPATIENT
Start: 2023-08-24 | End: 2023-08-28

## 2023-08-24 RX ADMIN — GABAPENTIN 100 MILLIGRAM(S): 400 CAPSULE ORAL at 06:42

## 2023-08-24 RX ADMIN — Medication 3 MILLILITER(S): at 06:41

## 2023-08-24 RX ADMIN — Medication 3 MILLILITER(S): at 18:01

## 2023-08-24 RX ADMIN — OXYCODONE HYDROCHLORIDE 10 MILLIGRAM(S): 5 TABLET ORAL at 08:58

## 2023-08-24 RX ADMIN — Medication 60 MILLIGRAM(S): at 06:41

## 2023-08-24 RX ADMIN — LOSARTAN POTASSIUM 25 MILLIGRAM(S): 100 TABLET, FILM COATED ORAL at 08:48

## 2023-08-24 RX ADMIN — ATORVASTATIN CALCIUM 20 MILLIGRAM(S): 80 TABLET, FILM COATED ORAL at 22:14

## 2023-08-24 RX ADMIN — MORPHINE SULFATE 2 MILLIGRAM(S): 50 CAPSULE, EXTENDED RELEASE ORAL at 22:14

## 2023-08-24 RX ADMIN — ALBUTEROL 2 PUFF(S): 90 AEROSOL, METERED ORAL at 08:49

## 2023-08-24 RX ADMIN — Medication 0.5 MILLIGRAM(S): at 03:17

## 2023-08-24 RX ADMIN — MORPHINE SULFATE 2 MILLIGRAM(S): 50 CAPSULE, EXTENDED RELEASE ORAL at 22:45

## 2023-08-24 RX ADMIN — Medication 100 MILLIGRAM(S): at 18:00

## 2023-08-24 RX ADMIN — OXYCODONE HYDROCHLORIDE 10 MILLIGRAM(S): 5 TABLET ORAL at 08:03

## 2023-08-24 RX ADMIN — Medication 3 MILLILITER(S): at 22:14

## 2023-08-24 RX ADMIN — Medication 81 MILLIGRAM(S): at 08:48

## 2023-08-24 RX ADMIN — GABAPENTIN 100 MILLIGRAM(S): 400 CAPSULE ORAL at 22:14

## 2023-08-24 RX ADMIN — Medication 100 MILLIGRAM(S): at 06:41

## 2023-08-24 NOTE — PHYSICAL THERAPY INITIAL EVALUATION ADULT - LEVEL OF CONSCIOUSNESS, REHAB EVAL
appeared distracted/difficult to keep on task. pt ruminating on meds ordered and if PT knew the dermatologist office she went to./alert

## 2023-08-24 NOTE — PHYSICAL THERAPY INITIAL EVALUATION ADULT - DIAGNOSIS, PT EVAL
Chief complaint:   Chief Complaint   Patient presents with   • Abdominal Pain     Kidney Pain need a pain pill       Vitals:  Visit Vitals  /74   Pulse 81   Temp 97 °F (36.1 °C) (Temporal)   Resp 18   Ht 5' 6\" (1.676 m)   Wt 86.1 kg   SpO2 100%   BMI 30.65 kg/m²       HISTORY OF PRESENT ILLNESS     3/17 was started on Macrobid and Flomax and continues to have pressure and pain in the right lower abdomen. She feels no different at this still having pain after urinating and a lot of pressure on the bottom of the right side of her abdomen. No fever and notes blood in the urine at this time. Her urology appointment was canceled due to the COVID 19 outbreak.     Abdominal Pain   This is a recurrent problem. The current episode started more than 1 month ago. The problem occurs daily. The problem has been unchanged. The pain is located in the RLQ. The pain is at a severity of 8/10. Quality: pressure sensation  The abdominal pain does not radiate. Associated symptoms include hematuria. Pertinent negatives include no constipation, dysuria, fever, nausea or vomiting. Relieved by: Ibuprofen did help  Treatments tried: Oxycodone, flomax, macrobid, and increased hydration. The treatment provided no relief.       Other significant problems:  Patient Active Problem List    Diagnosis Date Noted   • Pain in both hands 08/05/2019     Priority: Low   • Numbness and tingling in both hands 08/05/2019     Priority: Low     8/2019 - Normal EMG upper extremities          PAST MEDICAL, FAMILY AND SOCIAL HISTORY     Medications:  Current Outpatient Medications   Medication   • nitrofurantoin, macrocrystal-monohydrate, (MACROBID) 100 MG capsule   • naproxen (NAPROSYN) 500 MG tablet   • DULoxetine (CYMBALTA) 30 MG capsule   • predniSONE (DELTASONE) 5 MG tablet   • SUMAtriptan (IMITREX) 25 MG tablet   • topiramate (TOPAMAX) 25 MG tablet   • albuterol 108 (90 BASE) MCG/ACT inhaler   • valACYclovir (VALTREX) 500 MG tablet   •  acetaminophen-codeine (TYLENOL NO.3) 300-30 MG per tablet   • tamsulosin (FLOMAX) 0.4 MG Cap   • pregabalin (LYRICA) 75 MG capsule   • acetaminophen (TYLENOL) 500 MG tablet   • ondansetron (ZOFRAN ODT) 8 MG disintegrating tablet     No current facility-administered medications for this visit.        Allergies:  ALLERGIES:   Allergen Reactions   • Penicillins HIVES       Past Medical  History/Surgeries:  Past Medical History:   Diagnosis Date   • GERD (gastroesophageal reflux disease)    • Herpes    • History of headache    • Migraine    • RAD (reactive airway disease)        Past Surgical History:   Procedure Laterality Date   • Remv/revisn shldr/hip spica cast Right 2015   • Rotator cuff repair Right 2015   • Wrist surgery Right 2016    right       Family History:  Family History   Problem Relation Age of Onset   • Diabetes Sister         dx age 11   • Thyroid Sister    • Hypertension Maternal Aunt    • Hypertension Maternal Uncle    • Diabetes Maternal Grandmother    • Diabetes Maternal Grandfather    • Diabetes Paternal Grandmother    • Diabetes Paternal Grandfather    • Other Mother         arthritis unknown   • Hypertension Father        Social History:  Social History     Tobacco Use   • Smoking status: Current Every Day Smoker     Packs/day: 0.50     Types: Cigarettes   • Smokeless tobacco: Never Used   Substance Use Topics   • Alcohol use: No     Frequency: Never       REVIEW OF SYSTEMS     Review of Systems   Constitutional: Negative for fever.   Gastrointestinal: Positive for abdominal pain. Negative for constipation, nausea and vomiting.   Genitourinary: Positive for hematuria. Negative for difficulty urinating, dysuria and flank pain.       PHYSICAL EXAM     Physical Exam  Vitals signs reviewed.   Constitutional:       General: She is not in acute distress.     Appearance: She is well-developed. She is obese. She is not diaphoretic.   HENT:      Head: Normocephalic and atraumatic.   Eyes:       Conjunctiva/sclera: Conjunctivae normal.   Cardiovascular:      Rate and Rhythm: Normal rate and regular rhythm.      Heart sounds: Normal heart sounds.      Comments: No lower extremity edema noted bilaterally.  Pulmonary:      Effort: Pulmonary effort is normal. No respiratory distress.      Breath sounds: Normal breath sounds.   Abdominal:      General: Bowel sounds are normal.      Palpations: Abdomen is soft.      Tenderness: There is abdominal tenderness in the right lower quadrant and suprapubic area.   Neurological:      Mental Status: She is alert and oriented to person, place, and time. Mental status is at baseline.   Psychiatric:         Behavior: Behavior normal.         Thought Content: Thought content normal.        Component      Latest Ref Rng & Units 3/17/2020   COLOR       Yellow   CLARITY       Clear   GLUCOSE(URINE)      Negative mg/dL Negative   BILIRUBIN      Negative Negative   KETONES      Negative mg/dL Negative   SPECIFIC GRAVITY      1.005 - 1.030 1.025   BLOOD      Negative Trace (A)   pH      5.0 - 7.0 6.5   PROTEIN(URINE)      Negative mg/dL Negative   UROBILINOGEN      0.2, 1.0 mg/dL 1.0   NITRITE      Negative Negative   LEUKOCYTE ESTERASE      Negative Negative   Squamous EPI'S      None Seen, 1 to 5 /hpf 6 to 10 (A)   ERYTHROCYTES, URINALYSIS      None Seen, 1 to 2 /hpf 6 to 10 (A)   LEUKOCYTES, URINALYSIS      None Seen, 1 to 5 /hpf 1 to 5   BACTERIA, URINALYSIS      None Seen /hpf Moderate (A)   HYALINE CASTS, URINALYSIS      None Seen, 1 to 5 /lpf None Seen   MUCOUS       Present       Outside Facility   3/6/2020 CT RENAL STONE STUDY    COMPARISON: None    TECHNIQUE: Helical axial imaging through the abdomen and pelvis was performed without oral or IV contrast utilizing renal stone protocol, automatic exposure control and Iterative reconstruction. 2-D coronal and sagittal reconstructed images were provided   for evaluation.    CLINICAL INDIC ATION: Right-sided flank  pain.    FINDINGS: The demonstrated lower lung fields are clear. The heart size is normal. There is a 4 mm nonobstructing renal stone within the lower pole of the right kidney. The solid organs are otherwise unremarkable. There is normal CT appearance to the   gallbladder. The noncontrast filled loops of bowel and appendix are normal in appearance. The aorta and iliac arteries are normal in size and appearance.    In the pelvis, the ureters demonstrate normal course and caliber to their junction with the normal appearing urinary bladder. The uterus and adnexal regions are unremarkable. There is no free fluid, lymphadenopathy or abnormal soft tissue mass.   Phleboliths are noted within the pelvis.    IMPRESSION:  1. Nonobstructing 4 mm right lower pole renal stone.  2. The exam is otherwise unremarkable.    ASSESSMENT/PLAN     1. Nephrolithiasis  2. RLQ abdominal pain  - Will repeat UA and get culture, finish Macrobid.   - Hydration encouraged.   - Will give acetaminophen/codeine #3 for pain  - eWIPDMP (prescription drug monitoring program) reviewed and no red flags noted in terms of multiple prescribers, multiple drug fills and duplicate medication classes.    - Consider Repeat CT scan if symptoms persist.   - URINE, BACTERIAL CULTURE; Future  - acetaminophen-codeine (TYLENOL NO.3) 300-30 MG per tablet; Take 1 tablet by mouth every 4 hours as needed for Pain.  Dispense: 30 tablet; Refill: 0  - URINALYSIS & REFLEX MICROSCOPIC  - URINALYSIS MACROSCOPIC       Impaired functional mobility and impaired integument

## 2023-08-24 NOTE — PHYSICAL THERAPY INITIAL EVALUATION ADULT - GENERAL OBSERVATIONS, REHAB EVAL
pt rec'd in bed, NAD, agreed to session. seen w/ LELAND Abraham and WILLIE Baxter. pt desaturating on room air w/ conversation, pt cleared for 2L NC w/ session.

## 2023-08-24 NOTE — CONSULT NOTE ADULT - SUBJECTIVE AND OBJECTIVE BOX
CHIEF COMPLAINT: edema, sob     HISTORY OF PRESENT ILLNESS:  77y F pmh EtOH use disorder, CAD, emphysema, HTN, HLD, seizure disorder presents emergency department after being sent in by Dr. Rosario for DVT and positive stress test.  Patient c/o generally feeling of malaise, pain to the right lower extremity, sunburn causing pain on b/L lower extremities, HA.     ROS: Denies CP, palpitation, N/V/D, fever, cough, chills, dizziness, abm pain, recent travel, sick contact         Allergies    No Known Allergies    Intolerances    	    MEDICATIONS:  furosemide   Injectable 40 milliGRAM(s) IV Push daily  losartan 25 milliGRAM(s) Oral daily  NIFEdipine XL 60 milliGRAM(s) Oral daily    doxycycline monohydrate Capsule 100 milliGRAM(s) Oral every 12 hours    albuterol    90 MICROgram(s) HFA Inhaler 2 Puff(s) Inhalation every 6 hours  albuterol    90 MICROgram(s) HFA Inhaler 2 Puff(s) Inhalation every 6 hours PRN  albuterol/ipratropium for Nebulization 3 milliLiter(s) Nebulizer every 6 hours    acetaminophen     Tablet .. 650 milliGRAM(s) Oral every 6 hours PRN  clonazePAM  Tablet 0.5 milliGRAM(s) Oral every 12 hours PRN  gabapentin 100 milliGRAM(s) Oral every 8 hours  melatonin 3 milliGRAM(s) Oral at bedtime PRN  mirtazapine 7.5 milliGRAM(s) Oral at bedtime  ondansetron Injectable 4 milliGRAM(s) IV Push every 8 hours PRN  oxycodone    5 mG/acetaminophen 325 mG 1 Tablet(s) Oral once  oxyCODONE    IR 5 milliGRAM(s) Oral every 4 hours PRN  oxyCODONE    IR 10 milliGRAM(s) Oral every 4 hours PRN  zolpidem 5 milliGRAM(s) Oral at bedtime PRN    aluminum hydroxide/magnesium hydroxide/simethicone Suspension 30 milliLiter(s) Oral every 4 hours PRN  senna 2 Tablet(s) Oral at bedtime          PAST MEDICAL & SURGICAL HISTORY:  Hypertension      Hyperlipidemia      CAD (coronary artery disease)      Migraine      Anxiety      Emphysema, unspecified      HTN (hypertension)      Anxiety      Coronary artery disease      Stented coronary artery      Seizure      Alcohol abuse      Migraine      Pain of right hip joint      MRSA bacteremia      Essential hypertension      CAD (coronary artery disease)      Elevated brain natriuretic peptide (BNP) level      S/P bladder repair      Status post total hip replacement, right  5/21/18      History of arthroplasty of right hip          FAMILY HISTORY:  Family history of coronary artery disease in daughter (Child)    Family history of essential hypertension        SOCIAL HISTORY:    current tobacco and etoh    REVIEW OF SYSTEMS:  See HPI, otherwise complete 10 point review of systems negative    [ ] All others negative	    PHYSICAL EXAM:  T(C): 36.7 (08-24-23 @ 06:38), Max: 36.7 (08-24-23 @ 00:29)  HR: 72 (08-24-23 @ 06:38) (72 - 86)  BP: 145/73 (08-24-23 @ 06:38) (145/73 - 163/75)  RR: 18 (08-24-23 @ 06:38) (18 - 18)  SpO2: 93% (08-24-23 @ 06:38) (91% - 93%)  Wt(kg): --  I&O's Summary      Appearance: No Acute Distress	  HEENT:  Normal oral mucosa, PERRL, EOMI	  Cardiovascular: Normal S1 S2, No JVD, 2/6 gardenia  Respiratory: Lungs clear to auscultation bilaterally  Gastrointestinal:  Soft, Non-tender, + BS	  Skin: No rashes, No ecchymoses, No cyanosis	  Neurologic: Non-focal  Extremities: No clubbing, cyanosis or edema  Vascular: Peripheral pulses palpable 2+ bilaterally  Psychiatry: A & O x 3, Mood & affect appropriate    Laboratory Data:	 	    CBC Full  -  ( 23 Aug 2023 19:21 )  WBC Count : 6.58 K/uL  Hemoglobin : 14.5 g/dL  Hematocrit : 46.7 %  Platelet Count - Automated : 303 K/uL  Mean Cell Volume : 93.2 fl  Mean Cell Hemoglobin : 28.9 pg  Mean Cell Hemoglobin Concentration : 31.0 gm/dL  Auto Neutrophil # : 4.85 K/uL  Auto Lymphocyte # : 1.08 K/uL  Auto Monocyte # : 0.48 K/uL  Auto Eosinophil # : 0.11 K/uL  Auto Basophil # : 0.03 K/uL  Auto Neutrophil % : 73.6 %  Auto Lymphocyte % : 16.4 %  Auto Monocyte % : 7.3 %  Auto Eosinophil % : 1.7 %  Auto Basophil % : 0.5 %    08-23    138  |  99  |  41<H>  ----------------------------<  98  4.1   |  25  |  0.79    Ca    9.5      23 Aug 2023 19:21    TPro  8.5<H>  /  Alb  4.2  /  TBili  0.4  /  DBili  x   /  AST  25  /  ALT  18  /  AlkPhos  105  08-23      proBNP:   Lipid Profile:   HgA1c:   TSH:       CARDIAC MARKERS:            Interpretation of Telemetry: 	    ECG:  	  RADIOLOGY:  OTHER: 	    PREVIOUS DIAGNOSTIC TESTING:    [ ] Echocardiogram:  [ ] Catheterization:  [ ] Stress Test:  	    Assessment:  77y F pmh EtOH use disorder, CAD, emphysema, HTN, HLD, seizure disorder presents with edema, DVT and need for further cardiac workup    Recs:  cardiac stable  no e/o acs  plan for LHC given evidence of ischemia on outpatient nst  tte normal lv/rv fxn, moderate AS; currently doesnt appear volume up and suspect edema predominately 2/2 venous insufficiency and dvt = obtain cxr and reduce lasix to 20mg po qd  obtain carotid artery duplex to re-evaluate severity  vascular consult for dvt, start hep gtt, hold xarelto for cath  asa and statin for pad  will follow          Greater than 60 minutes spent on total encounter; more than 50% of the visit was spent counseling and/or coordinating care by the attending physician.   	  Chaka Lawrence MD   Cardiovascular Diseases  [phone]

## 2023-08-24 NOTE — CONSULT NOTE ADULT - ASSESSMENT
ASSESSMENT: 78y/o F w/ PMHx EtOH use disorder, CAD, emphysema, HTN, HLD, seizure disorder, chronic R lateral malleolus wound s/p RLE angio w/ Dr. Reyes 4/2023 presents to ED after being sent in by Dr. Rosario for DVT and positive stress test. Carotid duplex w/ findings of SIMON stenosis of 70-99%, LICA 50-69%. Patient is asymptomatic and     PLAN:   -    ASSESSMENT: 76y/o F w/ PMHx EtOH use disorder, CAD, emphysema, HTN, HLD, seizure disorder, chronic R lateral malleolus wound s/p RLE angio w/ Dr. Reyes 4/2023 presents to ED after being sent in by Dr. Rosario for DVT and positive stress test. Carotid duplex w/ findings of SIMON stenosis of 70-99%, LICA 50-69%. Patient is asymptomatic. Vascular surgery consulted.    PLAN:   - No acute vascular surgery intervention  - Continue ASA/statin  - Can get CTA head/neck to clarify anatomy for possible TCAR  - Patient will need medical/cards optimization  - Rest of care per primary team    Patient discussed with vascular fellow, Dr. Dubon.    Peyton Zamora, PGY-2  Vascular Surgery  [phone]   ASSESSMENT: 76y/o F w/ PMHx EtOH use disorder, CAD, emphysema, HTN, HLD, seizure disorder, chronic R lateral malleolus wound s/p RLE angio w/ Dr. Reyes 4/2023 presents to ED after being sent in by Dr. Rosario for DVT and positive stress test. Carotid duplex w/ findings of SIMON stenosis of 70-99%, LICA 50-69%. Patient is asymptomatic. Vascular surgery consulted.    PLAN:   - No acute vascular surgery intervention  - Continue ASA/statin  - Nonurgent CTA head/neck to clarify anatomy for possible TCAR  - Patient will need medical/cards optimization  - Rest of care per primary team    Patient discussed with vascular fellow, Dr. Dubon.    Peyton Zamora, PGY-2  Vascular Surgery  [phone]

## 2023-08-24 NOTE — PATIENT PROFILE ADULT - FALL HARM RISK - HARM RISK INTERVENTIONS

## 2023-08-24 NOTE — PHYSICAL THERAPY INITIAL EVALUATION ADULT - NAME OF DISCHARGE PLANNER
Nursing Note by Morgan Nuno RT at 05/03/18 10:16 AM     Author:  Morgan Nuno RT Service:  (none) Author Type:  Ordering User     Filed:  05/03/18 10:16 AM Encounter Date:  5/3/2018 Status:  Signed     :  Morgan Nuno RT (Ordering User)            Exam completed and forwarded to the reading physician.  W.O[AL1.1T]      Revision History        User Key Date/Time User Provider Type Action    > AL1.1 05/03/18 10:16 AM Morgan Nuno RT Ordering User Sign    T - Template            
NAYAN Pena

## 2023-08-24 NOTE — CONSULT NOTE ADULT - SUBJECTIVE AND OBJECTIVE BOX
VASCULAR SURGERY CONSULT NOTE  --------------------------------------------------------------------------------------------  HPI:  78y/o F w/ PMHx EtOH use disorder, CAD, emphysema, HTN, HLD, seizure disorder, chronic R lateral malleolus wound s/p RLE angio w/ Dr. Reyes 4/2023 presents to ED after being sent in by Dr. Rosario for DVT and positive stress test. Patient c/o generally feeling of malaise, pain to the right lower extremity, sunburn causing pain on b/L lower extremities, HA.     Vascular surgery consulted for b/l carotid artery stenosis on duplex. Patient reports that Dr. Rosario did a US in office and sent her in. Denies episodes of changes in vision, confusion, changes in speech, numbness/weakness. Patient smokes 1PPD for the past many years and is visibly SOB when talking. Denies home O2, states that her breathing has gotten specifically worse over the last year. Is unable to walk across the room without getting SOB.    Carotid duplex w/ findings of SIMON stenosis of 70-99%, LICA 50-69%.    Of note, patient reports that R lateral malleolus wound is still not healed. RLE angio w/ Dr. Reyes 4/2023 w/ distal SFA and pop occluded, AT recon. Patient is not a great open bypass candidate given chronic hip infection.         ROS: 10-system review is otherwise negative except HPI above.      PAST MEDICAL & SURGICAL HISTORY:  Hypertension      Hyperlipidemia      CAD (coronary artery disease)      Migraine      Anxiety      Emphysema, unspecified      HTN (hypertension)      Anxiety      Coronary artery disease      Stented coronary artery      Seizure      Alcohol abuse      Migraine      Pain of right hip joint      MRSA bacteremia      Essential hypertension      CAD (coronary artery disease)      Elevated brain natriuretic peptide (BNP) level      S/P bladder repair      Status post total hip replacement, right  5/21/18      History of arthroplasty of right hip        FAMILY HISTORY:  Family history of coronary artery disease in daughter (Child)    Family history of essential hypertension      [] Family history not pertinent as reviewed with the patient and family    SOCIAL HISTORY:     ALLERGIES: No Known Allergies      HOME MEDICATIONS:     CURRENT MEDICATIONS  MEDICATIONS (STANDING): albuterol    90 MICROgram(s) HFA Inhaler 2 Puff(s) Inhalation every 6 hours  albuterol/ipratropium for Nebulization 3 milliLiter(s) Nebulizer every 6 hours  aspirin enteric coated 81 milliGRAM(s) Oral daily  atorvastatin 20 milliGRAM(s) Oral at bedtime  doxycycline monohydrate Capsule 100 milliGRAM(s) Oral every 12 hours  gabapentin 100 milliGRAM(s) Oral every 8 hours  losartan 25 milliGRAM(s) Oral daily  mirtazapine 7.5 milliGRAM(s) Oral at bedtime  nicotine - 21 mG/24Hr(s) Patch 1 Patch Transdermal daily  NIFEdipine XL 60 milliGRAM(s) Oral daily  senna 2 Tablet(s) Oral at bedtime    MEDICATIONS (PRN):acetaminophen     Tablet .. 650 milliGRAM(s) Oral every 6 hours PRN Temp greater or equal to 38C (100.4F), Mild Pain (1 - 3)  albuterol    90 MICROgram(s) HFA Inhaler 2 Puff(s) Inhalation every 6 hours PRN for shortness of breath and/or wheezing  aluminum hydroxide/magnesium hydroxide/simethicone Suspension 30 milliLiter(s) Oral every 4 hours PRN Dyspepsia  clonazePAM  Tablet 0.5 milliGRAM(s) Oral every 12 hours PRN Anxiety  melatonin 3 milliGRAM(s) Oral at bedtime PRN Insomnia  ondansetron Injectable 4 milliGRAM(s) IV Push every 8 hours PRN Nausea and/or Vomiting  oxycodone    5 mG/acetaminophen 325 mG 2 Tablet(s) Oral every 6 hours PRN Moderate Pain (4 - 6)  zolpidem 5 milliGRAM(s) Oral at bedtime PRN Insomnia    --------------------------------------------------------------------------------------------    Vitals:   T(C): 36.8 (08-24-23 @ 17:28), Max: 36.8 (08-24-23 @ 17:28)  HR: 80 (08-24-23 @ 17:28) (68 - 86)  BP: 126/70 (08-24-23 @ 17:28) (126/70 - 199/78)  RR: 18 (08-24-23 @ 17:28) (13 - 20)  SpO2: 90% (08-24-23 @ 17:28) (90% - 99%)  CAPILLARY BLOOD GLUCOSE          Height (cm): 157.5 (08-23 @ 15:59)  Weight (kg): 56.7 (08-23 @ 15:59)  BMI (kg/m2): 22.9 (08-23 @ 15:59)  BSA (m2): 1.57 (08-23 @ 15:59)    PHYSICAL EXAM:  General: NAD, Lying in bed comfortably  Neuro: Alert and answering questions appropriately   HEENT: Grossly normal, EOMI  Cardio: Regular rate  Resp: Good effort, no signs of respiratory distress  Vascular: RLE erythema and warmth extending onto foot - palpable R AT, R lateral malleolus ulcer  Musculoskeletal: All 4 extremities moving spontaneously, no limitations    --------------------------------------------------------------------------------------------    LABS  CBC (08-23 @ 19:21)                              14.5                           6.58    )----------------(  303        73.6  % Neutrophils, 16.4  % Lymphocytes, ANC: 4.85                                46.7<H>    BMP (08-23 @ 19:21)             138     |  99      |  41<H> 		Ca++ --      Ca 9.5                ---------------------------------( 98    		Mg --                 4.1     |  25      |  0.79  			Ph --        LFTs (08-23 @ 19:21)      TPro 8.5<H> / Alb 4.2 / TBili 0.4 / DBili -- / AST 25 / ALT 18 / AlkPhos 105    Coags (08-23 @ 19:21)  aPTT 30.5 / INR 1.01 / PT 11.1          --------------------------------------------------------------------------------------------    MICROBIOLOGY  Urinalysis (08-23 @ 19:21):     Color:  / Appearance:  / SG:  / pH:  / Gluc: 98 / Ketones:  / Bili:  / Urobili:  / Protein : / Nitrites:  / Leuk.Est:  / RBC:  / WBC:  / Sq Epi:  / Non Sq Epi:  / Bacteria          --------------------------------------------------------------------------------------------    IMAGING  < from: [facility] Duplex Carotid, [place] (08.24.23 @ 15:50) >  FINDINGS:    There is diffuse intimal thickening. Soft and calcified plaque is present   in both common carotid arteries, carotid bulbs and bifurcations. The   internal carotid arteries are tortuous. Disturbed color flow and elevated   blood flow velocities are present in both internal and external carotid   arteries.    Peak systolic velocities are as follows:    RIGHT:  PROX CCA = 123 cm/s  DIST CCA = 100 cm/s  PROX ICA = 314 cm/s  DIST ICA = 86 cm/s  ECA = 356 cm/s    LEFT:  PROX CCA = 129 cm/s  DIST CCA = 106 cm/s  PROX ICA = 166 cm/s  Mid ICA = 188 cm/s  DIST ICA = 49 cm/s  ECA = 212 cm/s    Antegrade flow is noted within both vertebral arteries.    IMPRESSION: Diffuse mixed plaque with hemodynamically significant   stenoses of both internal carotid arteries. The degree of luminal   narrowing is estimated at 70-99% in the right internal carotid artery and   50-69% in the left internal carotid artery.    Bilateral external carotid artery stenoses, severe on the right and mild   to moderate on the left.    < end of copied text >

## 2023-08-24 NOTE — PROGRESS NOTE ADULT - ASSESSMENT
77y F pmh EtOH use disorder, CAD, emphysema, HTN, HLD, seizure disorder sent to ED for DVT and positive stress test, needing further eval     DVT, lower extremity.   - Hx/o DVT as OTP not confirmed by in hospital Doppler.   - c/w Xarelto for now, possible clot disolved    PAD (peripheral artery disease).   - s/p ANDREA/PVR:  Moderate b/l LE arterial flow limitation localizing to the popliteal and infrapopliteal levels  - C/w Xarelto  - ASA, Statin   - Vascular consult     Chronic diastolic heart failure.   - hx of diastolic HF w/ mild pulm HTN, on lasix 40mg at home  - Echo (4/22): EF 75% w/ stage 2 diastolic function  - IV lasix.    HTN (hypertension).   - C/w home meds.    Emphysema/COPD.   - Inhalers.    Pain due to right hip joint prosthesis.   - Hx/o Rt hip joint OM, s/p MRSA bacteremia   - Eval by orhto in past, not surgical candidate   - On chronic abx, c/w Doxycycline.    Carotid stenosis  - Vascular evaluation    Skin erythema   - Dermatology evaluation    Donald Wooten MD phone [phone]

## 2023-08-24 NOTE — PHYSICAL THERAPY INITIAL EVALUATION ADULT - PERTINENT HX OF CURRENT PROBLEM, REHAB EVAL
77y F pmh EtOH use disorder, CAD, emphysema, HTN, HLD, seizure disorder, h/o R hip joint OM s/p mRSA bacteremia (evaluated by ortho in the past, not a surgical candidate) presents to emergency department after being sent in by Dr. Rosario for DVT and positive stress test.  Patient c/o generally feeling of malaise, pain to the right lower extremity, sunburn causing pain on b/L lower extremities, HA. pt pending [facility].

## 2023-08-24 NOTE — PHYSICAL THERAPY INITIAL EVALUATION ADULT - ADDITIONAL COMMENTS
pt states she lives with spouse (dependent at baseline, has HHA) in a private home with about 3+3 steps to enter (+stair lift to enter), first floor set up inside. pt states she is unable to sleep upstairs and now sleeps on couch on first floor. pt states she was ambulatory with a RW. pt states she owns a wheelchair and has access to a shower chair (but does not it). pt states she has been hiring a HHA to assist her with ADLs at this time. pt describes being in and out of subacute rehab facilities, and not wanting to go back.

## 2023-08-24 NOTE — PHYSICAL THERAPY INITIAL EVALUATION ADULT - MODALITIES TREATMENT COMMENTS
dressing rec'd C/D/I, no s/s of infection. pt uncertain how wound started but describes being present for over a year. wound mostly slough covering, scant mostly serous drainage, no malodor appreciated. wound cleansed w/ NS, cavilon to periwound, medihoney to wound bed, nonwoven cover, wrapped w/ demetrius to secure, signed and dated. Dressing recs reviewed w/ TIFF Abraham and WILLIE Baxter.

## 2023-08-25 PROCEDURE — 99221 1ST HOSP IP/OBS SF/LOW 40: CPT

## 2023-08-25 PROCEDURE — 99232 SBSQ HOSP IP/OBS MODERATE 35: CPT

## 2023-08-25 PROCEDURE — 70498 CT ANGIOGRAPHY NECK: CPT | Mod: 26

## 2023-08-25 PROCEDURE — 70496 CT ANGIOGRAPHY HEAD: CPT | Mod: 26

## 2023-08-25 PROCEDURE — 73610 X-RAY EXAM OF ANKLE: CPT | Mod: 26,RT

## 2023-08-25 PROCEDURE — 73630 X-RAY EXAM OF FOOT: CPT | Mod: 26,RT

## 2023-08-25 RX ORDER — FUROSEMIDE 40 MG
20 TABLET ORAL DAILY
Refills: 0 | Status: DISCONTINUED | OUTPATIENT
Start: 2023-08-26 | End: 2023-08-28

## 2023-08-25 RX ORDER — COLLAGENASE CLOSTRIDIUM HIST. 250 UNIT/G
1 OINTMENT (GRAM) TOPICAL DAILY
Refills: 0 | Status: DISCONTINUED | OUTPATIENT
Start: 2023-08-25 | End: 2023-08-28

## 2023-08-25 RX ADMIN — Medication 1 TABLET(S): at 22:07

## 2023-08-25 RX ADMIN — Medication 100 MILLIGRAM(S): at 05:50

## 2023-08-25 RX ADMIN — Medication 3 MILLILITER(S): at 05:50

## 2023-08-25 RX ADMIN — GABAPENTIN 100 MILLIGRAM(S): 400 CAPSULE ORAL at 22:06

## 2023-08-25 RX ADMIN — Medication 1 TABLET(S): at 23:07

## 2023-08-25 RX ADMIN — LOSARTAN POTASSIUM 25 MILLIGRAM(S): 100 TABLET, FILM COATED ORAL at 05:50

## 2023-08-25 RX ADMIN — Medication 0.5 MILLIGRAM(S): at 02:38

## 2023-08-25 RX ADMIN — Medication 0.5 MILLIGRAM(S): at 19:44

## 2023-08-25 RX ADMIN — Medication 3 MILLILITER(S): at 22:07

## 2023-08-25 RX ADMIN — GABAPENTIN 100 MILLIGRAM(S): 400 CAPSULE ORAL at 13:43

## 2023-08-25 RX ADMIN — ATORVASTATIN CALCIUM 20 MILLIGRAM(S): 80 TABLET, FILM COATED ORAL at 22:07

## 2023-08-25 RX ADMIN — ZOLPIDEM TARTRATE 5 MILLIGRAM(S): 10 TABLET ORAL at 22:10

## 2023-08-25 RX ADMIN — Medication 3 MILLILITER(S): at 17:17

## 2023-08-25 RX ADMIN — Medication 100 MILLIGRAM(S): at 17:17

## 2023-08-25 RX ADMIN — GABAPENTIN 100 MILLIGRAM(S): 400 CAPSULE ORAL at 05:50

## 2023-08-25 RX ADMIN — RIVAROXABAN 20 MILLIGRAM(S): KIT at 12:08

## 2023-08-25 RX ADMIN — Medication 81 MILLIGRAM(S): at 12:08

## 2023-08-25 RX ADMIN — Medication 60 MILLIGRAM(S): at 05:50

## 2023-08-25 RX ADMIN — Medication 3 MILLILITER(S): at 12:07

## 2023-08-25 NOTE — CONSULT NOTE ADULT - SUBJECTIVE AND OBJECTIVE BOX
HPI:  77y F pmh EtOH use disorder, CAD, emphysema, HTN, HLD, seizure disorder presents emergency department after being sent in by Dr. Rosario for DVT and positive stress test.  Patient c/o generally feeling of malaise, pain to the right lower extremity, sunburn causing pain on b/L lower extremities, HA.       Dermatology consulted for evaluation of a "chronic rash", ongoing for several months. Rash is not itchy, painful, or otherwise symptomatic.       PAST MEDICAL & SURGICAL HISTORY:  Hypertension      Hyperlipidemia      CAD (coronary artery disease)      Migraine      Anxiety      Emphysema, unspecified      HTN (hypertension)      Anxiety      Coronary artery disease      Stented coronary artery      Seizure      Alcohol abuse      Migraine      Pain of right hip joint      MRSA bacteremia      Essential hypertension      CAD (coronary artery disease)      Elevated brain natriuretic peptide (BNP) level      S/P bladder repair      Status post total hip replacement, right  5/21/18      History of arthroplasty of right hip          Review of Systems:  REVIEW OF SYSTEMS      General: no fevers/chills, no lethargy	    Skin/Breast: see HPI  	  Ophthalmologic: no eye pain or change in vision  	  ENMT: no dysphagia or change in hearing    Respiratory and Thorax: no SOB or cough  	  Cardiovascular: no palpitations or chest pain    Gastrointestinal: no abdominal pain or blood in stool     Genitourinary: no dysuria or frequency    Musculoskeletal: no joint pains or weakness	    Neurological: no weakness, numbness , or tingling    MEDICATIONS  (STANDING):  albuterol    90 MICROgram(s) HFA Inhaler 2 Puff(s) Inhalation every 6 hours  albuterol/ipratropium for Nebulization 3 milliLiter(s) Nebulizer every 6 hours  aspirin enteric coated 81 milliGRAM(s) Oral daily  atorvastatin 20 milliGRAM(s) Oral at bedtime  doxycycline monohydrate Capsule 100 milliGRAM(s) Oral every 12 hours  gabapentin 100 milliGRAM(s) Oral every 8 hours  losartan 25 milliGRAM(s) Oral daily  mirtazapine 7.5 milliGRAM(s) Oral at bedtime  nicotine - 21 mG/24Hr(s) Patch 1 Patch Transdermal daily  NIFEdipine XL 60 milliGRAM(s) Oral daily  rivaroxaban 20 milliGRAM(s) Oral daily  senna 2 Tablet(s) Oral at bedtime    ALLERGIES: No Known Allergies        SOCIAL HISTORY:    Social History:  NO ethanol.   Quit tobacco before admission at [facility].  Spouse apparently with a CVA.  Daughter  FAMILY HISTORY:  Family history of coronary artery disease in daughter (Child)    Family history of essential hypertension          VITAL SIGNS LAST 24 HOURS:  T(F): 97.2 (08-25 @ 11:41), Max: 98.2 (08-24 @ 17:28)  HR: 70 (08-25 @ 11:50) (68 - 86)  BP: 123/73 (08-25 @ 11:50) (123/73 - 159/70)  RR: 18 (08-25 @ 11:41) (14 - 18)    PHYSICAL EXAM:     The patient was alert and oriented X 3, well nourished, and in no  apparent distress.  OP showed no ulcerations  There was no visible lymphadenopathy.  Conjunctiva were non injected  There was no clubbing or edema of extremities.  The scalp, hair, face, eyebrows, lips, OP, neck, chest, back,   extremities X 4, nails were examined.  There was no hyperhidrosis or bromhidrosis.    Of note on skin exam:   Hypopigmented macules and patches on arms   Ill-defined patchy erythema of the b/l lower extremities   ____________________________________    LABS:                        14.5   6.58  )-----------( 303      ( 23 Aug 2023 19:21 )             46.7     08-23    138  |  99  |  41<H>  ----------------------------<  98  4.1   |  25  |  0.79    Ca    9.5      23 Aug 2023 19:21    TPro  8.5<H>  /  Alb  4.2  /  TBili  0.4  /  DBili  x   /  AST  25  /  ALT  18  /  AlkPhos  105  08-23    PT/INR - ( 23 Aug 2023 19:21 )   PT: 11.1 sec;   INR: 1.01 ratio         PTT - ( 23 Aug 2023 19:21 )  PTT:30.5 sec  Urinalysis Basic - ( 23 Aug 2023 19:21 )    Color: x / Appearance: x / SG: x / pH: x  Gluc: 98 mg/dL / Ketone: x  / Bili: x / Urobili: x   Blood: x / Protein: x / Nitrite: x   Leuk Esterase: x / RBC: x / WBC x   Sq Epi: x / Non Sq Epi: x / Bacteria: x     HPI:  77y F pmh EtOH use disorder, CAD, emphysema, HTN, HLD, seizure disorder presents emergency department after being sent in by Dr. Rosario for DVT and positive stress test.  Patient c/o generally feeling of malaise, pain to the right lower extremity, sunburn causing pain on b/L lower extremities, HA.       Dermatology consulted for evaluation of a "chronic rash", ongoing for about a year per the patient. Rash is on arms and legs. Not itchy, painful, or otherwise symptomatic. Pt also notes a "hole" in the R lower extremity, present for about a year, that is painful       PAST MEDICAL & SURGICAL HISTORY:  Hypertension      Hyperlipidemia      CAD (coronary artery disease)      Migraine      Anxiety      Emphysema, unspecified      HTN (hypertension)      Anxiety      Coronary artery disease      Stented coronary artery      Seizure      Alcohol abuse      Migraine      Pain of right hip joint      MRSA bacteremia      Essential hypertension      CAD (coronary artery disease)      Elevated brain natriuretic peptide (BNP) level      S/P bladder repair      Status post total hip replacement, right  5/21/18      History of arthroplasty of right hip          Review of Systems:  REVIEW OF SYSTEMS      General: no fevers/chills, no lethargy	    Skin/Breast: see HPI  	  Ophthalmologic: no eye pain or change in vision  	  ENMT: no dysphagia or change in hearing    Respiratory and Thorax: no SOB or cough  	  Cardiovascular: no palpitations or chest pain    Gastrointestinal: no abdominal pain or blood in stool     Genitourinary: no dysuria or frequency    Musculoskeletal: no joint pains or weakness	    Neurological: no weakness, numbness , or tingling    MEDICATIONS  (STANDING):  albuterol    90 MICROgram(s) HFA Inhaler 2 Puff(s) Inhalation every 6 hours  albuterol/ipratropium for Nebulization 3 milliLiter(s) Nebulizer every 6 hours  aspirin enteric coated 81 milliGRAM(s) Oral daily  atorvastatin 20 milliGRAM(s) Oral at bedtime  doxycycline monohydrate Capsule 100 milliGRAM(s) Oral every 12 hours  gabapentin 100 milliGRAM(s) Oral every 8 hours  losartan 25 milliGRAM(s) Oral daily  mirtazapine 7.5 milliGRAM(s) Oral at bedtime  nicotine - 21 mG/24Hr(s) Patch 1 Patch Transdermal daily  NIFEdipine XL 60 milliGRAM(s) Oral daily  rivaroxaban 20 milliGRAM(s) Oral daily  senna 2 Tablet(s) Oral at bedtime    ALLERGIES: No Known Allergies        SOCIAL HISTORY:    Social History:  NO ethanol.   Quit tobacco before admission at [facility].  Spouse apparently with a CVA.  Daughter  FAMILY HISTORY:  Family history of coronary artery disease in daughter (Child)    Family history of essential hypertension          VITAL SIGNS LAST 24 HOURS:  T(F): 97.2 (08-25 @ 11:41), Max: 98.2 (08-24 @ 17:28)  HR: 70 (08-25 @ 11:50) (68 - 86)  BP: 123/73 (08-25 @ 11:50) (123/73 - 159/70)  RR: 18 (08-25 @ 11:41) (14 - 18)    PHYSICAL EXAM:     The patient was alert and oriented X 3, well nourished, and in no  apparent distress.  OP showed no ulcerations  There was no visible lymphadenopathy.  Conjunctiva were non injected  There was no clubbing or edema of extremities.  The scalp, hair, face, eyebrows, lips, OP, neck, chest, back,   extremities X 4, nails were examined.  There was no hyperhidrosis or bromhidrosis.    Of note on skin exam:   Hypopigmented macules and patches on arms   Ill-defined patchy erythema of the b/l lower extremities   ____________________________________    LABS:                        14.5   6.58  )-----------( 303      ( 23 Aug 2023 19:21 )             46.7     08-23    138  |  99  |  41<H>  ----------------------------<  98  4.1   |  25  |  0.79    Ca    9.5      23 Aug 2023 19:21    TPro  8.5<H>  /  Alb  4.2  /  TBili  0.4  /  DBili  x   /  AST  25  /  ALT  18  /  AlkPhos  105  08-23    PT/INR - ( 23 Aug 2023 19:21 )   PT: 11.1 sec;   INR: 1.01 ratio         PTT - ( 23 Aug 2023 19:21 )  PTT:30.5 sec  Urinalysis Basic - ( 23 Aug 2023 19:21 )    Color: x / Appearance: x / SG: x / pH: x  Gluc: 98 mg/dL / Ketone: x  / Bili: x / Urobili: x   Blood: x / Protein: x / Nitrite: x   Leuk Esterase: x / RBC: x / WBC x   Sq Epi: x / Non Sq Epi: x / Bacteria: x     HPI:  77y F pmh EtOH use disorder, CAD, emphysema, HTN, HLD, seizure disorder presents emergency department after being sent in by Dr. Rosario for DVT and positive stress test.  Patient c/o generally feeling of malaise, pain to the right lower extremity, sunburn causing pain on b/L lower extremities, HA.       Dermatology consulted for evaluation of a "chronic rash", ongoing for about a year per the patient. Rash is on arms and legs. Not itchy, painful, or otherwise symptomatic. Pt also notes a "hole" in the R lower extremity, present for about a year, that is painful       PAST MEDICAL & SURGICAL HISTORY:  Hypertension      Hyperlipidemia      CAD (coronary artery disease)      Migraine      Anxiety      Emphysema, unspecified      HTN (hypertension)      Anxiety      Coronary artery disease      Stented coronary artery      Seizure      Alcohol abuse      Migraine      Pain of right hip joint      MRSA bacteremia      Essential hypertension      CAD (coronary artery disease)      Elevated brain natriuretic peptide (BNP) level      S/P bladder repair      Status post total hip replacement, right  5/21/18      History of arthroplasty of right hip          Review of Systems:  REVIEW OF SYSTEMS      General: no fevers/chills, no lethargy	    Skin/Breast: see HPI  	  Ophthalmologic: no eye pain or change in vision  	  ENMT: no dysphagia or change in hearing    Respiratory and Thorax: no SOB or cough  	  Cardiovascular: no palpitations or chest pain    Gastrointestinal: no abdominal pain or blood in stool     Genitourinary: no dysuria or frequency    Musculoskeletal: no joint pains or weakness	    Neurological: no weakness, numbness , or tingling    MEDICATIONS  (STANDING):  albuterol    90 MICROgram(s) HFA Inhaler 2 Puff(s) Inhalation every 6 hours  albuterol/ipratropium for Nebulization 3 milliLiter(s) Nebulizer every 6 hours  aspirin enteric coated 81 milliGRAM(s) Oral daily  atorvastatin 20 milliGRAM(s) Oral at bedtime  doxycycline monohydrate Capsule 100 milliGRAM(s) Oral every 12 hours  gabapentin 100 milliGRAM(s) Oral every 8 hours  losartan 25 milliGRAM(s) Oral daily  mirtazapine 7.5 milliGRAM(s) Oral at bedtime  nicotine - 21 mG/24Hr(s) Patch 1 Patch Transdermal daily  NIFEdipine XL 60 milliGRAM(s) Oral daily  rivaroxaban 20 milliGRAM(s) Oral daily  senna 2 Tablet(s) Oral at bedtime    ALLERGIES: No Known Allergies        SOCIAL HISTORY:    Social History:  NO ethanol.   Quit tobacco before admission at [facility].  Spouse apparently with a CVA.  Daughter  FAMILY HISTORY:  Family history of coronary artery disease in daughter (Child)    Family history of essential hypertension          VITAL SIGNS LAST 24 HOURS:  T(F): 97.2 (08-25 @ 11:41), Max: 98.2 (08-24 @ 17:28)  HR: 70 (08-25 @ 11:50) (68 - 86)  BP: 123/73 (08-25 @ 11:50) (123/73 - 159/70)  RR: 18 (08-25 @ 11:41) (14 - 18)    PHYSICAL EXAM:     The patient was alert and oriented X 3, well nourished, and in no  apparent distress.  OP showed no ulcerations  There was no visible lymphadenopathy.  Conjunctiva were non injected  There was no clubbing or edema of extremities.  The scalp, hair, face, eyebrows, lips, OP, neck, chest, back,   extremities X 4, nails were examined.  There was no hyperhidrosis or bromhidrosis.    Of note on skin exam:   Hypopigmented macules and patches on arms   Purple-red ecchymosis scattered on the extensor forearms b/l   Ill-defined patchy  erythema of the b/l lower extremities with b/l lower extremity edema   Well-circumscribed ulcer on the R lateral malleolus with granulation tissue at base, no drainage or purulence   ____________________________________    LABS:                        14.5   6.58  )-----------( 303      ( 23 Aug 2023 19:21 )             46.7     08-23    138  |  99  |  41<H>  ----------------------------<  98  4.1   |  25  |  0.79    Ca    9.5      23 Aug 2023 19:21    TPro  8.5<H>  /  Alb  4.2  /  TBili  0.4  /  DBili  x   /  AST  25  /  ALT  18  /  AlkPhos  105  08-23    PT/INR - ( 23 Aug 2023 19:21 )   PT: 11.1 sec;   INR: 1.01 ratio         PTT - ( 23 Aug 2023 19:21 )  PTT:30.5 sec  Urinalysis Basic - ( 23 Aug 2023 19:21 )    Color: x / Appearance: x / SG: x / pH: x  Gluc: 98 mg/dL / Ketone: x  / Bili: x / Urobili: x   Blood: x / Protein: x / Nitrite: x   Leuk Esterase: x / RBC: x / WBC x   Sq Epi: x / Non Sq Epi: x / Bacteria: x     HPI:  77y F pmh EtOH use disorder, CAD, emphysema, HTN, HLD, seizure disorder presents emergency department after being sent in by Dr. Rosario for DVT and positive stress test.  Dermatology consulted for evaluation of a "chronic rash", ongoing for about a year per the patient. Rash is on arms and legs. Not itchy, painful, or otherwise symptomatic. Pt also notes a "hole" in the R lower extremity, present for about a year, that is painful. Pt with acute DVT of the R gastrocnemius vein     PAST MEDICAL & SURGICAL HISTORY:  Hypertension      Hyperlipidemia      CAD (coronary artery disease)      Migraine      Anxiety      Emphysema, unspecified      HTN (hypertension)      Anxiety      Coronary artery disease      Stented coronary artery      Seizure      Alcohol abuse      Migraine      Pain of right hip joint      MRSA bacteremia      Essential hypertension      CAD (coronary artery disease)      Elevated brain natriuretic peptide (BNP) level      S/P bladder repair      Status post total hip replacement, right  5/21/18      History of arthroplasty of right hip          Review of Systems:  REVIEW OF SYSTEMS      General: no fevers/chills, no lethargy	    Skin/Breast: see HPI  	  Ophthalmologic: no eye pain or change in vision  	  ENMT: no dysphagia or change in hearing    Respiratory and Thorax: no SOB or cough  	  Cardiovascular: no palpitations or chest pain    Gastrointestinal: no abdominal pain or blood in stool     Genitourinary: no dysuria or frequency    Musculoskeletal: no joint pains or weakness	    Neurological: no weakness, numbness , or tingling    MEDICATIONS  (STANDING):  albuterol    90 MICROgram(s) HFA Inhaler 2 Puff(s) Inhalation every 6 hours  albuterol/ipratropium for Nebulization 3 milliLiter(s) Nebulizer every 6 hours  aspirin enteric coated 81 milliGRAM(s) Oral daily  atorvastatin 20 milliGRAM(s) Oral at bedtime  doxycycline monohydrate Capsule 100 milliGRAM(s) Oral every 12 hours  gabapentin 100 milliGRAM(s) Oral every 8 hours  losartan 25 milliGRAM(s) Oral daily  mirtazapine 7.5 milliGRAM(s) Oral at bedtime  nicotine - 21 mG/24Hr(s) Patch 1 Patch Transdermal daily  NIFEdipine XL 60 milliGRAM(s) Oral daily  rivaroxaban 20 milliGRAM(s) Oral daily  senna 2 Tablet(s) Oral at bedtime    ALLERGIES: No Known Allergies        SOCIAL HISTORY:    Social History:  NO ethanol.   Quit tobacco before admission at [facility].  Spouse apparently with a CVA.  Daughter  FAMILY HISTORY:  Family history of coronary artery disease in daughter (Child)    Family history of essential hypertension          VITAL SIGNS LAST 24 HOURS:  T(F): 97.2 (08-25 @ 11:41), Max: 98.2 (08-24 @ 17:28)  HR: 70 (08-25 @ 11:50) (68 - 86)  BP: 123/73 (08-25 @ 11:50) (123/73 - 159/70)  RR: 18 (08-25 @ 11:41) (14 - 18)    PHYSICAL EXAM:     The patient was alert and oriented X 3, well nourished, and in no  apparent distress.  OP showed no ulcerations  There was no visible lymphadenopathy.  Conjunctiva were non injected  There was no clubbing or edema of extremities.  The scalp, hair, face, eyebrows, lips, OP, neck, chest, back,   extremities X 4, nails were examined.  There was no hyperhidrosis or bromhidrosis.    Of note on skin exam:   Hypopigmented macules and patches on arms   Purple-red ecchymosis scattered on the extensor forearms b/l   Ill-defined patchy  erythema of the b/l lower extremities with b/l lower extremity edema   Well-circumscribed ulcer on the R lateral malleolus with granulation tissue at base, no drainage or purulence   ____________________________________    LABS:                        14.5   6.58  )-----------( 303      ( 23 Aug 2023 19:21 )             46.7     08-23    138  |  99  |  41<H>  ----------------------------<  98  4.1   |  25  |  0.79    Ca    9.5      23 Aug 2023 19:21    TPro  8.5<H>  /  Alb  4.2  /  TBili  0.4  /  DBili  x   /  AST  25  /  ALT  18  /  AlkPhos  105  08-23    PT/INR - ( 23 Aug 2023 19:21 )   PT: 11.1 sec;   INR: 1.01 ratio         PTT - ( 23 Aug 2023 19:21 )  PTT:30.5 sec  Urinalysis Basic - ( 23 Aug 2023 19:21 )    Color: x / Appearance: x / SG: x / pH: x  Gluc: 98 mg/dL / Ketone: x  / Bili: x / Urobili: x   Blood: x / Protein: x / Nitrite: x   Leuk Esterase: x / RBC: x / WBC x   Sq Epi: x / Non Sq Epi: x / Bacteria: x

## 2023-08-25 NOTE — CONSULT NOTE ADULT - ASSESSMENT
76 y/o F w/ R lateral malleolus wound to capsule  - Pt seen and evaluated  - Afebrile, WBC 6.58  - R lateral malleolus wound to level of capsule, fibrogranular wound bed, indurated borders, well circumscribed, no malodor or purulence. R Lower Extremity erythema consistent with DVT.   - R Lower Extremity Venous Ultrasound: Acute below the knee DVT confined to the gastrocnemius vein of the right   calf.  - STRICT decubitus precautions, Z- FLOWS boots at all time when in bed and in chair resting.   - Recommend wound care with santyl to right ankle wound   - recommended [facility] recs   - No acute intervention from podiatry  - Pod plan local wound care pending Vasc recs  - Discussed with  attending.

## 2023-08-25 NOTE — CHART NOTE - NSCHARTNOTEFT_GEN_A_CORE
Wound Care Team Note:    Request for wound care consult for foot wound received from team. Ms. Srivastava was seen and is being managed by Podiatry. Will defer to podiatry for management. Will not follow. Please refer any questions or concerns to podiatry.    Peyton Gordon, NP-C, CWOCN

## 2023-08-25 NOTE — PROGRESS NOTE ADULT - ASSESSMENT
77y F pmh EtOH use disorder, CAD, emphysema, HTN, HLD, seizure disorder sent to ED for DVT and positive stress test, needing further eval     DVT, lower extremity.   - Hx/o DVT as OTP not confirmed by in hospital Doppler.   - c/w Xarelto for now, possible clot disolved    PAD (peripheral artery disease).   - s/p ANDREA/PVR:  Moderate b/l LE arterial flow limitation localizing to the popliteal and infrapopliteal levels  - C/w Xarelto  - ASA, Statin   - Vascular consult     Chronic diastolic heart failure.   - hx of diastolic HF w/ mild pulm HTN, on lasix 40mg at home  - Echo (4/22): EF 75% w/ stage 2 diastolic function  - IV lasix.    HTN (hypertension).   - C/w home meds.    Emphysema/COPD.   - Inhalers.    Pain due to right hip joint prosthesis.   - Hx/o Rt hip joint OM, s/p MRSA bacteremia   - Eval by orhto in past, not surgical candidate   - On chronic abx, c/w Doxycycline.    Carotid stenosis  - Vascular evaluation noted. No intervention  - CTa head and neck pending   - ASA/ Statin    Skin erythema   - Dermatology evaluation noted    Donald Wooten MD phone [phone]  77y F pmh EtOH use disorder, CAD, emphysema, HTN, HLD, seizure disorder sent to ED for DVT and positive stress test, needing further eval     DVT, lower extremity.   - c/w Xarelto     PAD (peripheral artery disease).   - s/p ANDREA/PVR:  Moderate b/l LE arterial flow limitation localizing to the popliteal and infrapopliteal levels  - C/w Xarelto  - ASA, Statin   - Vascular consult     Chronic diastolic heart failure.   - hx of diastolic HF w/ mild pulm HTN, on lasix 40mg at home  - Echo (4/22): EF 75% w/ stage 2 diastolic function  - IV lasix.    HTN (hypertension).   - C/w home meds.    Emphysema/COPD.   - Inhalers.    Pain due to right hip joint prosthesis.   - Hx/o Rt hip joint OM, s/p MRSA bacteremia   - Eval by orhto in past, not surgical candidate   - On chronic abx, c/w Doxycycline.    Carotid stenosis  - Vascular evaluation noted. No intervention  - CTa head and neck pending   - ASA/ Statin    Skin erythema   - Dermatology evaluation noted    Donald Wooten MD phone [phone]

## 2023-08-25 NOTE — PROGRESS NOTE ADULT - ASSESSMENT
76y/o F w/ PMHx EtOH use disorder, CAD, emphysema, HTN, HLD, seizure disorder, chronic R lateral malleolus wound s/p RLE angio w/ Dr. Reyes 4/2023 presents to ED after being sent in by Dr. Rosario for DVT and positive stress test. Carotid duplex w/ findings of SIMON stenosis of 70-99%, LICA 50-69%. Patient is asymptomatic. Vascular surgery consulted.    PLAN:   - No acute vascular surgery intervention  - Continue ASA/statin  - CTA head/neck to clarify anatomy for possible TCAR    Vascular Surgery  [phone]

## 2023-08-25 NOTE — CONSULT NOTE ADULT - ASSESSMENT
Assessment: Stasis dermatitis, b/l lower extremities with post-inflammatory hypopigmentation on th extremities     Plan:   - Recommend leg elevation and compression when able       The patient's chart was reviewed in addition to being seen and examined at bedside with the dermatology attending Dr. Mathis.  Recommendations were communicated with the primary team.    Dermatology to sign off at this time. Please notify us and re-consult as needed for new or concerning changes to skin exam.    Graciela Gottlieb MD, TOBI  Resident Physician, PGY-3  [facility] Dermatology  Pager: [phone]  Office: [phone] Assessment:   #Stasis dermatitis 2/2 venous insufficiency b/l lower extremities with   #Vascular ulcer (arterial vs venous) on the R lateral malleolus   #Senile purpura on the upper extremities - a common, benign condition characterized by recurrent purple ecchymoses on the extensor surfaces of forearms attributed to dermal tissue atrophy and blood vessel fragility     Plan:   - Recommend leg elevation and compression when able       The patient's chart was reviewed in addition to being seen and examined at bedside with the dermatology attending Dr. Mathis.  Recommendations were communicated with the primary team.    Dermatology to sign off at this time. Please notify us and re-consult as needed for new or concerning changes to skin exam.    Graciela Gottlieb MD, TOBI  Resident Physician, PGY-3  [facility] Dermatology  Pager: [phone]  Office: [phone] Assessment:   #Stasis dermatitis 2/2 venous insufficiency b/l lower extremities with   #Venous ulcer on the R lateral malleolus   #Senile purpura on the upper extremities - a common, benign condition characterized by recurrent purple ecchymoses on the extensor surfaces of forearms attributed to dermal tissue atrophy and blood vessel fragility     Plan:   - Recommend leg elevation and compression when able       The patient's chart was reviewed in addition to being seen and examined at bedside with the dermatology attending Dr. Mathis.  Recommendations were communicated with the primary team.    Dermatology to sign off at this time. Please notify us and re-consult as needed for new or concerning changes to skin exam.    Graciela Gottlieb MD, TOBI  Resident Physician, PGY-3  [facility] Dermatology  Pager: [phone]  Office: [phone] Assessment:   #Stasis dermatitis 2/2 venous insufficiency b/l lower extremities with   #Vascular (venous vs arterial) ulcer on the R lateral malleolus   #Senile purpura on the upper extremities - a common, benign condition characterized by recurrent purple ecchymoses on the extensor surfaces of forearms attributed to dermal tissue atrophy and blood vessel fragility     Plan:   - Recommend leg elevation and compression with ACE bandage if appropriate in setting of acute DVT   - Recommend obtaining arterial studies (ABIs) to evaluate for arterial insufficiency given painful ulcer on lateral malleoulus  - Recommend over-the-counter dermablend for bruising for senile purpura (pt to obtain outside hospital)   - Recommend liberal moisturization with over-the-counter ammonium lactate 12% lotion       The patient's chart was reviewed in addition to being seen and examined at bedside with the dermatology attending Dr. Mathis.  Recommendations were communicated with the primary team.    Graciela Gottlieb MD, TOBI  Resident Physician, PGY-3  [facility] Dermatology  Pager: [phone]  Office: [phone]

## 2023-08-25 NOTE — CONSULT NOTE ADULT - SUBJECTIVE AND OBJECTIVE BOX
JUANDAQUAN  [id number] 77yF   --------------------------------------  Patient is a 77y old  Female who presents with a chief complaint of DVT (25 Aug 2023 13:53)      HPI:  77y F pmh EtOH use disorder, CAD, emphysema, HTN, HLD, seizure disorder presents emergency department after being sent in by Dr. Rosario for DVT and positive stress test.  Patient c/o generally feeling of malaise, pain to the right lower extremity, sunburn causing pain on b/L lower extremities, HA.     ROS: Denies CP, SOB, palpitation, N/V/D, fever, cough, chills, dizziness, abm pain, recent travel, sick contact     A 10-system ROS was performed and is negative except as noted above and/or in the HPI. (23 Aug 2023 23:44)      PAST MEDICAL & SURGICAL HISTORY:  Hypertension      Hyperlipidemia      CAD (coronary artery disease)      Migraine      Anxiety      Emphysema, unspecified      HTN (hypertension)      Anxiety      Coronary artery disease      Stented coronary artery      Seizure      Alcohol abuse      Migraine      Pain of right hip joint      MRSA bacteremia      Essential hypertension      CAD (coronary artery disease)      Elevated brain natriuretic peptide (BNP) level      S/P bladder repair      Status post total hip replacement, right  5/21/18      History of arthroplasty of right hip          MEDICATIONS  (STANDING):  albuterol    90 MICROgram(s) HFA Inhaler 2 Puff(s) Inhalation every 6 hours  albuterol/ipratropium for Nebulization 3 milliLiter(s) Nebulizer every 6 hours  aspirin enteric coated 81 milliGRAM(s) Oral daily  atorvastatin 20 milliGRAM(s) Oral at bedtime  doxycycline monohydrate Capsule 100 milliGRAM(s) Oral every 12 hours  gabapentin 100 milliGRAM(s) Oral every 8 hours  losartan 25 milliGRAM(s) Oral daily  mirtazapine 7.5 milliGRAM(s) Oral at bedtime  nicotine - 21 mG/24Hr(s) Patch 1 Patch Transdermal daily  NIFEdipine XL 60 milliGRAM(s) Oral daily  rivaroxaban 20 milliGRAM(s) Oral daily  senna 2 Tablet(s) Oral at bedtime    MEDICATIONS  (PRN):  acetaminophen     Tablet .. 650 milliGRAM(s) Oral every 6 hours PRN Temp greater or equal to 38C (100.4F), Mild Pain (1 - 3)  acetaminophen 325 mG/butalbital 50 mG/caffeine 40 mG 1 Tablet(s) Oral every 8 hours PRN migraine  albuterol    90 MICROgram(s) HFA Inhaler 2 Puff(s) Inhalation every 6 hours PRN for shortness of breath and/or wheezing  aluminum hydroxide/magnesium hydroxide/simethicone Suspension 30 milliLiter(s) Oral every 4 hours PRN Dyspepsia  clonazePAM  Tablet 0.5 milliGRAM(s) Oral every 12 hours PRN Anxiety  melatonin 3 milliGRAM(s) Oral at bedtime PRN Insomnia  ondansetron Injectable 4 milliGRAM(s) IV Push every 8 hours PRN Nausea and/or Vomiting  oxycodone    5 mG/acetaminophen 325 mG 2 Tablet(s) Oral every 6 hours PRN Moderate Pain (4 - 6)  zolpidem 5 milliGRAM(s) Oral at bedtime PRN Insomnia      Allergies    No Known Allergies    Intolerances        --------------------------------------  VITALS:    Vital Signs Last 24 Hrs  T(C): 36.2 (25 Aug 2023 11:41), Max: 36.8 (24 Aug 2023 17:28)  T(F): 97.2 (25 Aug 2023 11:41), Max: 98.2 (24 Aug 2023 17:28)  HR: 70 (25 Aug 2023 11:50) (70 - 86)  BP: 123/73 (25 Aug 2023 11:50) (123/73 - 159/70)  BP(mean): 81 (24 Aug 2023 16:20) (81 - 81)  RR: 18 (25 Aug 2023 11:41) (16 - 18)  SpO2: 94% (25 Aug 2023 11:50) (88% - 96%)    Parameters below as of 25 Aug 2023 11:50  Patient On (Oxygen Delivery Method): nasal cannula  O2 Flow (L/min): 2      --------------------------------------  LABS:                          14.5   6.58  )-----------( 303      ( 23 Aug 2023 19:21 )             46.7       08-23    138  |  99  |  41<H>  ----------------------------<  98  4.1   |  25  |  0.79    Ca    9.5      23 Aug 2023 19:21    TPro  8.5<H>  /  Alb  4.2  /  TBili  0.4  /  DBili  x   /  AST  25  /  ALT  18  /  AlkPhos  105  08-23      CAPILLARY BLOOD GLUCOSE          PT/INR - ( 23 Aug 2023 19:21 )   PT: 11.1 sec;   INR: 1.01 ratio         PTT - ( 23 Aug 2023 19:21 )  PTT:30.5 sec    LOWER EXTREMITY PHYSICAL EXAM:      Vasular: DP/PT 0/4  B/L, CFT < 3 seconds B/L, Temperature gradient warm to warm R, warm to cool L    Neuro: Epicritic sensation intact to the level of the ankle, B/L.  Musculoskeletal/Ortho: s/p R USAMA & revision 9/2022  Skin: R lateral malleolus wound to level of capsule, fibrogranular wound bed, indurated borders, well circumscribed, no malodor or purulence. R Lower Extremity erythema consistent with DVT.       RADIOLOGY & ADDITIONAL STUDIES:      ACC: [id number] EXAM:  DUPLEX EXT VEINS LOWER RT   ORDERED BY: CORY CRAIG     PROCEDURE DATE:  08/24/2023          INTERPRETATION:  CLINICAL INFORMATION: History of right calf DVT.    COMPARISON: Right lower extremity venous duplex study performed earlier   the same day.    TECHNIQUE: Duplex sonography of the RIGHT LOWER extremity veins with   color and spectral Doppler, with and without compression.    FINDINGS:    There is normal compressibility of the right common femoral, femoral and   popliteal veins.  Doppler examination shows normal spontaneous and phasic flow.    There is occlusive thrombus in the gastrocnemius vein of the calf.   Absence of compression and venous flow is noted here.    IMPRESSION:    Acute below the knee DVT confined to the gastrocnemius vein of the right   calf.    Examination findings were conveyed to physician's assistant Leesa by   vascular technologist Ferny at 2100 hours on 8/24/2023 with read back.    --- End of Report ---            JAMES DILLARD MD; Attending Radiologist  This document has been electronically signed. Aug 25 2023  9:01AM

## 2023-08-26 ENCOUNTER — TRANSCRIPTION ENCOUNTER (OUTPATIENT)
Age: 78
End: 2023-08-26

## 2023-08-26 RX ADMIN — RIVAROXABAN 20 MILLIGRAM(S): KIT at 11:53

## 2023-08-26 RX ADMIN — ZOLPIDEM TARTRATE 5 MILLIGRAM(S): 10 TABLET ORAL at 22:09

## 2023-08-26 RX ADMIN — GABAPENTIN 100 MILLIGRAM(S): 400 CAPSULE ORAL at 13:28

## 2023-08-26 RX ADMIN — Medication 1 TABLET(S): at 16:16

## 2023-08-26 RX ADMIN — Medication 1 APPLICATION(S): at 15:07

## 2023-08-26 RX ADMIN — Medication 3 MILLILITER(S): at 17:19

## 2023-08-26 RX ADMIN — Medication 1 TABLET(S): at 06:27

## 2023-08-26 RX ADMIN — Medication 1 TABLET(S): at 06:57

## 2023-08-26 RX ADMIN — Medication 3 MILLILITER(S): at 05:15

## 2023-08-26 RX ADMIN — Medication 1 TABLET(S): at 15:46

## 2023-08-26 RX ADMIN — GABAPENTIN 100 MILLIGRAM(S): 400 CAPSULE ORAL at 05:15

## 2023-08-26 RX ADMIN — Medication 100 MILLIGRAM(S): at 05:15

## 2023-08-26 RX ADMIN — Medication 20 MILLIGRAM(S): at 05:15

## 2023-08-26 RX ADMIN — Medication 1 TABLET(S): at 23:51

## 2023-08-26 RX ADMIN — ATORVASTATIN CALCIUM 20 MILLIGRAM(S): 80 TABLET, FILM COATED ORAL at 22:09

## 2023-08-26 RX ADMIN — Medication 81 MILLIGRAM(S): at 11:53

## 2023-08-26 RX ADMIN — Medication 100 MILLIGRAM(S): at 17:19

## 2023-08-26 RX ADMIN — Medication 3 MILLILITER(S): at 11:54

## 2023-08-26 RX ADMIN — Medication 60 MILLIGRAM(S): at 05:15

## 2023-08-26 RX ADMIN — LOSARTAN POTASSIUM 25 MILLIGRAM(S): 100 TABLET, FILM COATED ORAL at 05:15

## 2023-08-26 RX ADMIN — Medication 0.5 MILLIGRAM(S): at 08:09

## 2023-08-26 RX ADMIN — Medication 0.5 MILLIGRAM(S): at 20:29

## 2023-08-26 RX ADMIN — GABAPENTIN 100 MILLIGRAM(S): 400 CAPSULE ORAL at 22:09

## 2023-08-26 NOTE — CHART NOTE - NSCHARTNOTEFT_GEN_A_CORE
Please schedule appointment for outpatient follow up with Dr. Uzair Reyes    [address] [place] [address], [address], [place] [address]  Contact: [phone]    Vascular Surgery   [facility] Vascular Surgery     76 yo F w/ PMHx EtOH use disorder, CAD, emphysema, HTN, HLD, seizure disorder, chronic R lateral malleolus wound s/p RLE angio w/ Dr. Reyes 4/2023 presents to ED after being sent in by Dr. Rosario for DVT and positive stress test. Vascular surgery consulted for evaluation of asymptomatic R ICA stenosis. Carotid duplex and CTa H/N complete    - No acute vascular surgical intervention   - Continue ASA and statin   - On Xarelto for DVT   - Please have patient f/u outpatient w/ Dr. Reyes       - [address] [facility], [address], [place] [address] (Contact: [phone])   - Please call back with questions or concerns       Vascular Surgery   [id number]

## 2023-08-26 NOTE — PROGRESS NOTE ADULT - ASSESSMENT
77y F pmh EtOH use disorder, CAD, emphysema, HTN, HLD, seizure disorder sent to ED for DVT and positive stress test, needing further eval     DVT, lower extremity.   - c/w Xarelto     PAD (peripheral artery disease).   - s/p ANDREA/PVR:  Moderate b/l LE arterial flow limitation localizing to the popliteal and infrapopliteal levels  - C/w Xarelto  - ASA, Statin   - Vascular consult     Chronic diastolic heart failure.   - hx of diastolic HF w/ mild pulm HTN, on lasix 40mg at home  - Echo (4/22): EF 75% w/ stage 2 diastolic function  - IV lasix.    HTN (hypertension).   - C/w home meds.    Emphysema/COPD.   - Inhalers.    Pain due to right hip joint prosthesis.   - Hx/o Rt hip joint OM, s/p MRSA bacteremia   - Eval by orhto in past, not surgical candidate   - On chronic abx, c/w Doxycycline.    Carotid stenosis  - Vascular follow  - CTa head and neck noted  - ASA/ Statin    Skin erythema   - Dermatology evaluation noted    DCP home if vascular surgery cleared . Follow with PMD/ Cardiology in 3-4 days    d/w daughter over phone.     Donald Wooten MD phone [phone]

## 2023-08-27 LAB
ANION GAP SERPL CALC-SCNC: 10 MMOL/L — SIGNIFICANT CHANGE UP (ref 5–17)
BUN SERPL-MCNC: 37 MG/DL — HIGH (ref 7–23)
CALCIUM SERPL-MCNC: 8.8 MG/DL — SIGNIFICANT CHANGE UP (ref 8.4–10.5)
CHLORIDE SERPL-SCNC: 98 MMOL/L — SIGNIFICANT CHANGE UP (ref 96–108)
CO2 SERPL-SCNC: 29 MMOL/L — SIGNIFICANT CHANGE UP (ref 22–31)
CREAT SERPL-MCNC: 1.47 MG/DL — HIGH (ref 0.5–1.3)
EGFR: 37 ML/MIN/1.73M2 — LOW
GLUCOSE SERPL-MCNC: 114 MG/DL — HIGH (ref 70–99)
HCT VFR BLD CALC: 41.2 % — SIGNIFICANT CHANGE UP (ref 34.5–45)
HGB BLD-MCNC: 13.5 G/DL — SIGNIFICANT CHANGE UP (ref 11.5–15.5)
MCHC RBC-ENTMCNC: 29.9 PG — SIGNIFICANT CHANGE UP (ref 27–34)
MCHC RBC-ENTMCNC: 32.8 GM/DL — SIGNIFICANT CHANGE UP (ref 32–36)
MCV RBC AUTO: 91.4 FL — SIGNIFICANT CHANGE UP (ref 80–100)
NRBC # BLD: 0 /100 WBCS — SIGNIFICANT CHANGE UP (ref 0–0)
PLATELET # BLD AUTO: 191 K/UL — SIGNIFICANT CHANGE UP (ref 150–400)
POTASSIUM SERPL-MCNC: 4.5 MMOL/L — SIGNIFICANT CHANGE UP (ref 3.5–5.3)
POTASSIUM SERPL-SCNC: 4.5 MMOL/L — SIGNIFICANT CHANGE UP (ref 3.5–5.3)
RBC # BLD: 4.51 M/UL — SIGNIFICANT CHANGE UP (ref 3.8–5.2)
RBC # FLD: 15.9 % — HIGH (ref 10.3–14.5)
SODIUM SERPL-SCNC: 137 MMOL/L — SIGNIFICANT CHANGE UP (ref 135–145)
WBC # BLD: 6.94 K/UL — SIGNIFICANT CHANGE UP (ref 3.8–10.5)
WBC # FLD AUTO: 6.94 K/UL — SIGNIFICANT CHANGE UP (ref 3.8–10.5)

## 2023-08-27 RX ADMIN — Medication 1 TABLET(S): at 23:04

## 2023-08-27 RX ADMIN — LOSARTAN POTASSIUM 25 MILLIGRAM(S): 100 TABLET, FILM COATED ORAL at 05:27

## 2023-08-27 RX ADMIN — Medication 1 TABLET(S): at 22:04

## 2023-08-27 RX ADMIN — RIVAROXABAN 20 MILLIGRAM(S): KIT at 11:38

## 2023-08-27 RX ADMIN — ATORVASTATIN CALCIUM 20 MILLIGRAM(S): 80 TABLET, FILM COATED ORAL at 22:03

## 2023-08-27 RX ADMIN — Medication 1 APPLICATION(S): at 11:38

## 2023-08-27 RX ADMIN — Medication 1 TABLET(S): at 10:50

## 2023-08-27 RX ADMIN — Medication 0.5 MILLIGRAM(S): at 17:20

## 2023-08-27 RX ADMIN — GABAPENTIN 100 MILLIGRAM(S): 400 CAPSULE ORAL at 13:07

## 2023-08-27 RX ADMIN — Medication 20 MILLIGRAM(S): at 05:27

## 2023-08-27 RX ADMIN — Medication 81 MILLIGRAM(S): at 11:38

## 2023-08-27 RX ADMIN — GABAPENTIN 100 MILLIGRAM(S): 400 CAPSULE ORAL at 22:03

## 2023-08-27 RX ADMIN — Medication 60 MILLIGRAM(S): at 05:26

## 2023-08-27 RX ADMIN — MIRTAZAPINE 7.5 MILLIGRAM(S): 45 TABLET, ORALLY DISINTEGRATING ORAL at 22:03

## 2023-08-27 RX ADMIN — Medication 0.5 MILLIGRAM(S): at 16:17

## 2023-08-27 RX ADMIN — Medication 1 TABLET(S): at 11:30

## 2023-08-27 RX ADMIN — Medication 100 MILLIGRAM(S): at 17:20

## 2023-08-27 RX ADMIN — ZOLPIDEM TARTRATE 5 MILLIGRAM(S): 10 TABLET ORAL at 22:03

## 2023-08-27 RX ADMIN — GABAPENTIN 100 MILLIGRAM(S): 400 CAPSULE ORAL at 05:26

## 2023-08-27 RX ADMIN — Medication 100 MILLIGRAM(S): at 05:27

## 2023-08-27 RX ADMIN — Medication 3 MILLIGRAM(S): at 20:24

## 2023-08-27 RX ADMIN — Medication 1 TABLET(S): at 01:00

## 2023-08-27 NOTE — DISCHARGE NOTE PROVIDER - NSDCMRMEDTOKEN_GEN_ALL_CORE_FT
Albuterol (Eqv-ProAir HFA) 90 mcg/inh inhalation aerosol: 2 inhaled every 6 hours as needed for  shortness of breath and/or wheezing  clonazePAM 0.5 mg oral tablet: 1 tab(s) orally every 12 hours  doxycycline monohydrate 50 mg oral capsule: 2 cap(s) orally every 12 hours  gabapentin 100 mg oral capsule: 1 cap(s) orally every 8 hours  Lasix 20 mg oral tablet: 1 tab(s) orally once a day  losartan 25 mg oral tablet: 1 tab(s) orally once a day  melatonin 3 mg oral tablet: 2 tab(s) orally once a day (at bedtime)  mirtazapine 7.5 mg oral tablet: 1 tab(s) orally once a day (at bedtime)  nicotine 14 mg/24 hr transdermal film, extended release: 1 patch transdermal once a day  NIFEdipine 60 mg oral tablet, extended release: 1 tab(s) orally once a day  Percocet 5 mg-325 mg oral tablet: 2 tab(s) orally every 4 hours as needed for  severe pain  rivaroxaban 15 mg oral tablet: 1 tab(s) orally 2 times a day (with meals) Loading dose (15mg twice a day) started on 5/24/23. Take loading dose for total 21 days (from 5/24/23). Start maintenance dose 20mg once in the evening thereafter  zolpidem 5 mg oral tablet: 1 tab(s) orally once a day (at bedtime) As needed Insomnia   Albuterol (Eqv-ProAir HFA) 90 mcg/inh inhalation aerosol: 2 inhaled every 6 hours as needed for  shortness of breath and/or wheezing  aspirin 81 mg oral delayed release tablet: 1 tab(s) orally once a day  clonazePAM 0.5 mg oral tablet: 1 tab(s) orally every 12 hours  doxycycline monohydrate 50 mg oral capsule: 2 cap(s) orally every 12 hours  gabapentin 100 mg oral capsule: 1 cap(s) orally every 8 hours  Lasix 20 mg oral tablet: 1 tab(s) orally once a day  Lipitor 20 mg oral tablet: 1 tab(s) orally once a day (at bedtime)  losartan 25 mg oral tablet: 1 tab(s) orally once a day  melatonin 3 mg oral tablet: 2 tab(s) orally once a day (at bedtime)  mirtazapine 7.5 mg oral tablet: 1 tab(s) orally once a day (at bedtime)  nicotine 14 mg/24 hr transdermal film, extended release: 1 patch transdermal once a day  NIFEdipine 60 mg oral tablet, extended release: 1 tab(s) orally once a day  Percocet 5 mg-325 mg oral tablet: 2 tab(s) orally every 4 hours as needed for  severe pain  rivaroxaban 20 mg oral tablet: 1 tab(s) orally once a day  Santyl 250 units/g topical ointment: Apply topically to affected area once a day  senna leaf extract oral tablet: 2 tab(s) orally once a day (at bedtime)  zolpidem 5 mg oral tablet: 1 tab(s) orally once a day (at bedtime) As needed Insomnia

## 2023-08-27 NOTE — DISCHARGE NOTE PROVIDER - HOSPITAL COURSE
Hospital course     77y F pmh EtOH use disorder, CAD, emphysema, HTN, HLD, seizure disorder sent to ED for DVT and positive stress test, needing further eval     DVT, lower extremity.   - c/w Xarelto     PAD (peripheral artery disease).   - s/p ANDREA/PVR:  Moderate b/l LE arterial flow limitation localizing to the popliteal and infrapopliteal levels  - C/w Xarelto  - ASA, Statin   - Vascular consult- Follow up outpatient with Dr. Uzair Reyes  [address] [place] [address], [address], [place] [address]  Contact: [phone]     Chronic diastolic heart failure.   - hx of diastolic HF w/ mild pulm HTN, on lasix 40mg at home  - Echo (4/22): EF 75% w/ stage 2 diastolic function  - IV lasix.    HTN (hypertension).   - C/w home meds.    Emphysema/COPD.   - Inhalers.    Pain due to right hip joint prosthesis.   - Hx/o Rt hip joint OM, s/p MRSA bacteremia   - Eval by orhto in past, not surgical candidate   - On chronic abx, c/w Doxycycline.    Carotid stenosis  - Vascular follow  - CTa head and neck noted  - ASA/ Statin    Skin erythema   - Dermatology evaluation noted    DCP [facility]. Follow with PMD/ Cardiology in 3-4 days       Hospital course     77y F pmh EtOH use disorder, CAD, emphysema, HTN, HLD, seizure disorder sent to ED for DVT and positive stress test, needing further eval     DVT, lower extremity.   - c/w Xarelto     PAD (peripheral artery disease).   - s/p ANDREA/PVR:  Moderate b/l LE arterial flow limitation localizing to the popliteal and infrapopliteal levels  - C/w Xarelto  - ASA, Statin   - Vascular consult- Follow up outpatient with Dr. Uzair Reyes  [address] [place] [address], [address], [place] [address]  Contact: [phone]     Chronic diastolic heart failure.   - hx of diastolic HF w/ mild pulm HTN, on lasix 40mg at home  - Echo (4/22): EF 75% w/ stage 2 diastolic function  - IV lasix.    HTN (hypertension).   - C/w home meds.    Emphysema/COPD.   - Inhalers.    Pain due to right hip joint prosthesis.   - Hx/o Rt hip joint OM, s/p MRSA bacteremia   - Eval by orhto in past, not surgical candidate   - On chronic abx, c/w Doxycycline.    Carotid stenosis  - Vascular follow  - CTa head and neck noted  - ASA/ Statin    Skin erythema   - Dermatology evaluation noted    Patient want to go home   Spoke with Daughter Brittni Patient has caregivers at home and family will provide transport    Discharge/Dispo/Med rec discussed with attending. Patient medically cleared for discharge with outpatient follow up with PCP

## 2023-08-27 NOTE — PROGRESS NOTE ADULT - ASSESSMENT
77y F pmh EtOH use disorder, CAD, emphysema, HTN, HLD, seizure disorder sent to ED for DVT and positive stress test, needing further eval     DVT, lower extremity.   - c/w Xarelto     PAD (peripheral artery disease).   - s/p ANDREA/PVR:  Moderate b/l LE arterial flow limitation localizing to the popliteal and infrapopliteal levels  - C/w Xarelto  - ASA, Statin   - Vascular consult noted    Chronic diastolic heart failure.   - hx of diastolic HF w/ mild pulm HTN, on lasix 40mg at home  - Echo (4/22): EF 75% w/ stage 2 diastolic function  - diurese    HTN (hypertension).   - C/w home meds.    Emphysema/COPD.   - Inhalers.    Pain due to right hip joint prosthesis.   - Hx/o Rt hip joint OM, s/p MRSA bacteremia   - Eval by orhto in past, not surgical candidate   - On chronic abx, c/w Doxycycline.    Carotid stenosis  - Vascular follow. No intervention now.  - CTa head and neck noted  - ASA/ Statin    Skin erythema   - Dermatology evaluation noted    DCP home. Refuses rehab.  Follow with PMD/ Cardiology/ Vascular surgery in 3-4 days    d/w daughter over phone.     Donald Wooten MD phone [phone]

## 2023-08-27 NOTE — DISCHARGE NOTE PROVIDER - NSDCCPCAREPLAN_GEN_ALL_CORE_FT
PRINCIPAL DISCHARGE DIAGNOSIS  Diagnosis: DVT, lower extremity  Assessment and Plan of Treatment: c/w medications  Take your "blood thinners" as prescribed.  Walking is encouraged, increase activity as tolerated.  If you develop new leg pain, swelling, and/or redness contact your healthcare provider.  If you develop new chest pain with difficulty breathing, a rapid heart rate and/or a feeling of passing out call emergency medical services 911.        SECONDARY DISCHARGE DIAGNOSES  Diagnosis: PAD (peripheral artery disease)  Assessment and Plan of Treatment: s/p ANDREA/PVR Moderate b/l LE arterial flow limitation localizing to the popliteal and infrapopliteal levels       Diagnosis: Chronic diastolic heart failure  Assessment and Plan of Treatment: Weigh yourself daily.  If you gain 3lbs in 3 days, or 5lbs in a week call your Health Care Provider.  Do not eat or drink foods containing more than 2000mg of salt (sodium) in your diet every day.  Call your Health Care Provider if you have any swelling or increased swelling in your feet, ankles, and/or stomach.  Take all of your medication as directed.  If you become dizzy call your Health Care Provider.      Diagnosis: HTN (hypertension)  Assessment and Plan of Treatment: Low salt diet  Activity as tolerated.  Take all medication as prescribed.  Follow up with your medical doctor for routine blood pressure monitoring at your next visit.  Notify your doctor if you have any of the following symptoms:   Dizziness, Lightheadedness, Blurry vision, Headache, Chest pain, Shortness of breath      Diagnosis: Emphysema/COPD  Assessment and Plan of Treatment: Call your Health Care provider upon arrival home to make a follow up appointment within one week.  Take all inhalers as prescribed by your Health Care Provider.  Take steroids as prescribed by your Health Care Provider.  If your cough increases infrequency and severity and/or you have shortness of breath or increased shortness of breath call your Health Care Provider.  If you develop fever, chills, night sweats, malaise, and/or change in mental status call your Health care Provider.  Nutrition is very important.  Eat small frequent meals.  Increase your activity as tolerated.  Do not stay in bed all day

## 2023-08-28 ENCOUNTER — TRANSCRIPTION ENCOUNTER (OUTPATIENT)
Age: 78
End: 2023-08-28

## 2023-08-28 VITALS
SYSTOLIC BLOOD PRESSURE: 112 MMHG | DIASTOLIC BLOOD PRESSURE: 71 MMHG | RESPIRATION RATE: 18 BRPM | HEART RATE: 65 BPM | TEMPERATURE: 98 F | OXYGEN SATURATION: 92 %

## 2023-08-28 PROCEDURE — 73630 X-RAY EXAM OF FOOT: CPT

## 2023-08-28 PROCEDURE — 80053 COMPREHEN METABOLIC PANEL: CPT

## 2023-08-28 PROCEDURE — 83880 ASSAY OF NATRIURETIC PEPTIDE: CPT

## 2023-08-28 PROCEDURE — 93454 CORONARY ARTERY ANGIO S&I: CPT

## 2023-08-28 PROCEDURE — 85027 COMPLETE CBC AUTOMATED: CPT

## 2023-08-28 PROCEDURE — 85730 THROMBOPLASTIN TIME PARTIAL: CPT

## 2023-08-28 PROCEDURE — 70498 CT ANGIOGRAPHY NECK: CPT

## 2023-08-28 PROCEDURE — 94640 AIRWAY INHALATION TREATMENT: CPT

## 2023-08-28 PROCEDURE — 80048 BASIC METABOLIC PNL TOTAL CA: CPT

## 2023-08-28 PROCEDURE — 93880 EXTRACRANIAL BILAT STUDY: CPT

## 2023-08-28 PROCEDURE — 85610 PROTHROMBIN TIME: CPT

## 2023-08-28 PROCEDURE — 96374 THER/PROPH/DIAG INJ IV PUSH: CPT

## 2023-08-28 PROCEDURE — 99285 EMERGENCY DEPT VISIT HI MDM: CPT

## 2023-08-28 PROCEDURE — 84484 ASSAY OF TROPONIN QUANT: CPT

## 2023-08-28 PROCEDURE — 70496 CT ANGIOGRAPHY HEAD: CPT

## 2023-08-28 PROCEDURE — 73610 X-RAY EXAM OF ANKLE: CPT

## 2023-08-28 PROCEDURE — 93971 EXTREMITY STUDY: CPT

## 2023-08-28 PROCEDURE — 86850 RBC ANTIBODY SCREEN: CPT

## 2023-08-28 PROCEDURE — C1769: CPT

## 2023-08-28 PROCEDURE — C1894: CPT

## 2023-08-28 PROCEDURE — 85025 COMPLETE CBC W/AUTO DIFF WBC: CPT

## 2023-08-28 PROCEDURE — 97162 PT EVAL MOD COMPLEX 30 MIN: CPT

## 2023-08-28 PROCEDURE — 86900 BLOOD TYPING SEROLOGIC ABO: CPT

## 2023-08-28 PROCEDURE — C1887: CPT

## 2023-08-28 PROCEDURE — 86901 BLOOD TYPING SEROLOGIC RH(D): CPT

## 2023-08-28 RX ORDER — ATORVASTATIN CALCIUM 80 MG/1
1 TABLET, FILM COATED ORAL
Qty: 30 | Refills: 0
Start: 2023-08-28 | End: 2023-09-26

## 2023-08-28 RX ORDER — RIVAROXABAN 15 MG-20MG
1 KIT ORAL
Qty: 0 | Refills: 0 | DISCHARGE
Start: 2023-08-28

## 2023-08-28 RX ORDER — COLLAGENASE CLOSTRIDIUM HIST. 250 UNIT/G
1 OINTMENT (GRAM) TOPICAL
Qty: 1 | Refills: 0
Start: 2023-08-28 | End: 2023-09-26

## 2023-08-28 RX ORDER — ASPIRIN/CALCIUM CARB/MAGNESIUM 324 MG
1 TABLET ORAL
Qty: 0 | Refills: 0 | DISCHARGE
Start: 2023-08-28

## 2023-08-28 RX ORDER — SENNA PLUS 8.6 MG/1
2 TABLET ORAL
Qty: 0 | Refills: 0 | DISCHARGE
Start: 2023-08-28

## 2023-08-28 RX ADMIN — Medication 1 TABLET(S): at 10:02

## 2023-08-28 RX ADMIN — GABAPENTIN 100 MILLIGRAM(S): 400 CAPSULE ORAL at 12:26

## 2023-08-28 RX ADMIN — LOSARTAN POTASSIUM 25 MILLIGRAM(S): 100 TABLET, FILM COATED ORAL at 05:49

## 2023-08-28 RX ADMIN — GABAPENTIN 100 MILLIGRAM(S): 400 CAPSULE ORAL at 05:49

## 2023-08-28 RX ADMIN — Medication 100 MILLIGRAM(S): at 05:49

## 2023-08-28 RX ADMIN — Medication 0.5 MILLIGRAM(S): at 15:57

## 2023-08-28 RX ADMIN — RIVAROXABAN 20 MILLIGRAM(S): KIT at 12:26

## 2023-08-28 RX ADMIN — Medication 20 MILLIGRAM(S): at 05:49

## 2023-08-28 RX ADMIN — Medication 60 MILLIGRAM(S): at 05:49

## 2023-08-28 RX ADMIN — Medication 1 APPLICATION(S): at 12:29

## 2023-08-28 RX ADMIN — Medication 81 MILLIGRAM(S): at 12:27

## 2023-08-28 RX ADMIN — Medication 1 TABLET(S): at 11:00

## 2023-08-28 NOTE — PROGRESS NOTE ADULT - PROVIDER SPECIALTY LIST ADULT
Cardiology
Internal Medicine
Cardiology
Cardiology
Internal Medicine
Vascular Surgery
Internal Medicine

## 2023-08-28 NOTE — DISCHARGE NOTE NURSING/CASE MANAGEMENT/SOCIAL WORK - PATIENT PORTAL LINK FT
You can access the FollowMyHealth Patient Portal offered by [facility] by registering at the following website: [email or web address]. By joining [facility]’s FollowMyHealth portal, you will also be able to view your health information using other applications (apps) compatible with our system.

## 2023-08-28 NOTE — PROVIDER CONTACT NOTE (OTHER) - ASSESSMENT
Pt AOx3, VSS. Pt refusing tele monitor. Pt states the tele stickers bother her and she doesn't want them
Pt A+Ox4, denies CP SOB at this time, pt refusing tele stating "I don't want or need it I feel fine" also refusing new IV stating 'im leaving today no more IVs"

## 2023-08-28 NOTE — PROVIDER CONTACT NOTE (OTHER) - ACTION/TREATMENT ORDERED:
NP aware, continue to monitor and will endorse to day team
NP made aware, no new orders at this time.

## 2023-08-28 NOTE — DISCHARGE NOTE NURSING/CASE MANAGEMENT/SOCIAL WORK - NSDCPEFALRISK_GEN_ALL_CORE
For information on Fall & Injury Prevention, visit: [email or web address] OR  [email or web address] OR  https://www.cdc.gov/steadi/patient.html

## 2023-08-28 NOTE — PROGRESS NOTE ADULT - SUBJECTIVE AND OBJECTIVE BOX
Patient is a 77y old  Female who presents with a chief complaint of DVT (24 Aug 2023 19:51)      SUBJECTIVE / OVERNIGHT EVENTS: Comfortable   Review of Systems  chest pain no  palpitations no  sob no  nausea no  headache no    MEDICATIONS  (STANDING):  albuterol    90 MICROgram(s) HFA Inhaler 2 Puff(s) Inhalation every 6 hours  albuterol/ipratropium for Nebulization 3 milliLiter(s) Nebulizer every 6 hours  aspirin enteric coated 81 milliGRAM(s) Oral daily  atorvastatin 20 milliGRAM(s) Oral at bedtime  doxycycline monohydrate Capsule 100 milliGRAM(s) Oral every 12 hours  gabapentin 100 milliGRAM(s) Oral every 8 hours  losartan 25 milliGRAM(s) Oral daily  mirtazapine 7.5 milliGRAM(s) Oral at bedtime  nicotine - 21 mG/24Hr(s) Patch 1 Patch Transdermal daily  NIFEdipine XL 60 milliGRAM(s) Oral daily  senna 2 Tablet(s) Oral at bedtime    MEDICATIONS  (PRN):  acetaminophen     Tablet .. 650 milliGRAM(s) Oral every 6 hours PRN Temp greater or equal to 38C (100.4F), Mild Pain (1 - 3)  albuterol    90 MICROgram(s) HFA Inhaler 2 Puff(s) Inhalation every 6 hours PRN for shortness of breath and/or wheezing  aluminum hydroxide/magnesium hydroxide/simethicone Suspension 30 milliLiter(s) Oral every 4 hours PRN Dyspepsia  clonazePAM  Tablet 0.5 milliGRAM(s) Oral every 12 hours PRN Anxiety  melatonin 3 milliGRAM(s) Oral at bedtime PRN Insomnia  ondansetron Injectable 4 milliGRAM(s) IV Push every 8 hours PRN Nausea and/or Vomiting  oxycodone    5 mG/acetaminophen 325 mG 2 Tablet(s) Oral every 6 hours PRN Moderate Pain (4 - 6)  zolpidem 5 milliGRAM(s) Oral at bedtime PRN Insomnia      Vital Signs Last 24 Hrs  T(C): 36.8 (24 Aug 2023 17:28), Max: 36.8 (24 Aug 2023 17:28)  T(F): 98.2 (24 Aug 2023 17:28), Max: 98.2 (24 Aug 2023 17:28)  HR: 80 (24 Aug 2023 17:28) (68 - 86)  BP: 126/70 (24 Aug 2023 17:28) (126/70 - 199/78)  BP(mean): 81 (24 Aug 2023 16:20) (67 - 97)  RR: 18 (24 Aug 2023 17:28) (13 - 20)  SpO2: 90% (24 Aug 2023 17:28) (90% - 99%)    Parameters below as of 24 Aug 2023 17:28  Patient On (Oxygen Delivery Method): room air        PHYSICAL EXAM:  GENERAL: NAD  HEAD:  Atraumatic, Normocephalic  EYES: EOMI, PERRLA, conjunctiva and sclera clear  NECK: Supple, No JVD  CHEST/LUNG: Clear to auscultation bilaterally; No wheeze  HEART: Regular rate and rhythm; No murmurs, rubs, or gallops  ABDOMEN: Soft, Nontender, Nondistended; Bowel sounds present  EXTREMITIES:  2+ Peripheral Pulses, No clubbing, cyanosis, or edema R leg ankle wound   PSYCH: AAOx3  NEUROLOGY: non-focal  SKIN: diffuse erythema on arms and legs     LABS:                        14.5   6.58  )-----------( 303      ( 23 Aug 2023 19:21 )             46.7     08-23    138  |  99  |  41<H>  ----------------------------<  98  4.1   |  25  |  0.79    Ca    9.5      23 Aug 2023 19:21    TPro  8.5<H>  /  Alb  4.2  /  TBili  0.4  /  DBili  x   /  AST  25  /  ALT  18  /  AlkPhos  105  08-23    PT/INR - ( 23 Aug 2023 19:21 )   PT: 11.1 sec;   INR: 1.01 ratio         PTT - ( 23 Aug 2023 19:21 )  PTT:30.5 sec      Urinalysis Basic - ( 23 Aug 2023 19:21 )    Color: x / Appearance: x / SG: x / pH: x  Gluc: 98 mg/dL / Ketone: x  / Bili: x / Urobili: x   Blood: x / Protein: x / Nitrite: x   Leuk Esterase: x / RBC: x / WBC x   Sq Epi: x / Non Sq Epi: x / Bacteria: x          RADIOLOGY & ADDITIONAL TESTS:    Imaging Personally Reviewed:  < from: [facility] Duplex Carotid, Bilat (08.24.23 @ 15:50) >  IMPRESSION: Diffuse mixed plaque with hemodynamically significant   stenoses of both internal carotid arteries. The degree of luminal   narrowing is estimated at 70-99% in the right internal carotid artery and   50-69% in the left internal carotid artery.    Bilateral external carotid artery stenoses, severe on the right and mild   to moderate on the left.    < end of copied text >  < from: [facility] Duplex Lower Ext Vein Scan, Right (08.24.23 @ 15:40) >  IMPRESSION:  No evidence of right lower extremity deep venous thrombosis.    < end of copied text >    Consultant(s) Notes Reviewed:      Care Discussed with Consultants/Other Providers:  
Patient is a 77y old  Female who presents with a chief complaint of DVT (27 Aug 2023 00:36)      SUBJECTIVE / OVERNIGHT EVENTS: Comfortable without new complaints.   Review of Systems  chest pain no  palpitations no  sob no  nausea no  headache no    MEDICATIONS  (STANDING):  albuterol    90 MICROgram(s) HFA Inhaler 2 Puff(s) Inhalation every 6 hours  albuterol/ipratropium for Nebulization 3 milliLiter(s) Nebulizer every 6 hours  aspirin enteric coated 81 milliGRAM(s) Oral daily  atorvastatin 20 milliGRAM(s) Oral at bedtime  collagenase Ointment 1 Application(s) Topical daily  doxycycline monohydrate Capsule 100 milliGRAM(s) Oral every 12 hours  furosemide    Tablet 20 milliGRAM(s) Oral daily  gabapentin 100 milliGRAM(s) Oral every 8 hours  losartan 25 milliGRAM(s) Oral daily  mirtazapine 7.5 milliGRAM(s) Oral at bedtime  nicotine - 21 mG/24Hr(s) Patch 1 Patch Transdermal daily  NIFEdipine XL 60 milliGRAM(s) Oral daily  rivaroxaban 20 milliGRAM(s) Oral daily  senna 2 Tablet(s) Oral at bedtime    MEDICATIONS  (PRN):  acetaminophen     Tablet .. 650 milliGRAM(s) Oral every 6 hours PRN Temp greater or equal to 38C (100.4F), Mild Pain (1 - 3)  acetaminophen 325 mG/butalbital 50 mG/caffeine 40 mG 1 Tablet(s) Oral every 8 hours PRN migraine  albuterol    90 MICROgram(s) HFA Inhaler 2 Puff(s) Inhalation every 6 hours PRN for shortness of breath and/or wheezing  aluminum hydroxide/magnesium hydroxide/simethicone Suspension 30 milliLiter(s) Oral every 4 hours PRN Dyspepsia  clonazePAM  Tablet 0.5 milliGRAM(s) Oral every 12 hours PRN Anxiety  melatonin 3 milliGRAM(s) Oral at bedtime PRN Insomnia  ondansetron Injectable 4 milliGRAM(s) IV Push every 8 hours PRN Nausea and/or Vomiting  oxycodone    5 mG/acetaminophen 325 mG 2 Tablet(s) Oral every 6 hours PRN Moderate Pain (4 - 6)  zolpidem 5 milliGRAM(s) Oral at bedtime PRN Insomnia      Vital Signs Last 24 Hrs  T(C): 37 (27 Aug 2023 14:41), Max: 37 (27 Aug 2023 14:41)  T(F): 98.6 (27 Aug 2023 14:41), Max: 98.6 (27 Aug 2023 14:41)  HR: 76 (27 Aug 2023 14:41) (65 - 76)  BP: 163/70 (27 Aug 2023 14:41) (123/74 - 163/70)  BP(mean): --  RR: 18 (27 Aug 2023 14:41) (18 - 18)  SpO2: 92% (27 Aug 2023 14:41) (92% - 95%)    Parameters below as of 27 Aug 2023 14:41  Patient On (Oxygen Delivery Method): room air        PHYSICAL EXAM:  GENERAL: NAD  HEAD:  Atraumatic, Normocephalic  EYES: EOMI, PERRLA, conjunctiva and sclera clear  NECK: Supple, No JVD  CHEST/LUNG: Clear to auscultation bilaterally; No wheeze  HEART: Regular rate and rhythm; No murmurs, rubs, or gallops  ABDOMEN: Soft, Nontender, Nondistended; Bowel sounds present  EXTREMITIES:  2+ Peripheral Pulses, No clubbing, cyanosis, or edema  PSYCH: AAOx3  NEUROLOGY: non-focal  SKIN: chronic rashes arms and legs    LABS:                        13.5   6.94  )-----------( 191      ( 27 Aug 2023 14:41 )             41.2     08-27    137  |  98  |  37<H>  ----------------------------<  114<H>  4.5   |  29  |  1.47<H>    Ca    8.8      27 Aug 2023 14:41            Urinalysis Basic - ( 27 Aug 2023 14:41 )    Color: x / Appearance: x / SG: x / pH: x  Gluc: 114 mg/dL / Ketone: x  / Bili: x / Urobili: x   Blood: x / Protein: x / Nitrite: x   Leuk Esterase: x / RBC: x / WBC x   Sq Epi: x / Non Sq Epi: x / Bacteria: x          RADIOLOGY & ADDITIONAL TESTS:    Imaging Personally Reviewed:    Consultant(s) Notes Reviewed:      Care Discussed with Consultants/Other Providers:  
Surgery Daily Progress Note  =====================================================  SUBJECTIVE: A 77y Female Patient seen and examined at bedside on AM rounds. Patient is feeling well.    ALLERGIES:  No Known Allergies      --------------------------------------------------------------------------------------    MEDICATIONS:    Neurologic Medications  acetaminophen     Tablet .. 650 milliGRAM(s) Oral every 6 hours PRN Temp greater or equal to 38C (100.4F), Mild Pain (1 - 3)  clonazePAM  Tablet 0.5 milliGRAM(s) Oral every 12 hours PRN Anxiety  gabapentin 100 milliGRAM(s) Oral every 8 hours  melatonin 3 milliGRAM(s) Oral at bedtime PRN Insomnia  mirtazapine 7.5 milliGRAM(s) Oral at bedtime  ondansetron Injectable 4 milliGRAM(s) IV Push every 8 hours PRN Nausea and/or Vomiting  oxycodone    5 mG/acetaminophen 325 mG 2 Tablet(s) Oral every 6 hours PRN Moderate Pain (4 - 6)  zolpidem 5 milliGRAM(s) Oral at bedtime PRN Insomnia    Respiratory Medications  albuterol    90 MICROgram(s) HFA Inhaler 2 Puff(s) Inhalation every 6 hours  albuterol    90 MICROgram(s) HFA Inhaler 2 Puff(s) Inhalation every 6 hours PRN for shortness of breath and/or wheezing  albuterol/ipratropium for Nebulization 3 milliLiter(s) Nebulizer every 6 hours    Cardiovascular Medications  losartan 25 milliGRAM(s) Oral daily  NIFEdipine XL 60 milliGRAM(s) Oral daily    Gastrointestinal Medications  aluminum hydroxide/magnesium hydroxide/simethicone Suspension 30 milliLiter(s) Oral every 4 hours PRN Dyspepsia  senna 2 Tablet(s) Oral at bedtime    Genitourinary Medications    Hematologic/Oncologic Medications  aspirin enteric coated 81 milliGRAM(s) Oral daily  rivaroxaban 20 milliGRAM(s) Oral daily    Antimicrobial/Immunologic Medications  doxycycline monohydrate Capsule 100 milliGRAM(s) Oral every 12 hours    Endocrine/Metabolic Medications  atorvastatin 20 milliGRAM(s) Oral at bedtime    Topical/Other Medications  nicotine - 21 mG/24Hr(s) Patch 1 Patch Transdermal daily    --------------------------------------------------------------------------------------    VITAL SIGNS:  No Known Allergies      T(C): 36.2 (08-25-23 @ 05:21), Max: 36.8 (08-24-23 @ 17:28)  HR: 72 (08-25-23 @ 05:21) (68 - 86)  BP: 134/61 (08-25-23 @ 05:21) (126/70 - 170/59)  RR: 18 (08-25-23 @ 05:21) (13 - 18)  SpO2: 94% (08-25-23 @ 05:21) (90% - 99%)  --------------------------------------------------------------------------------------    PHYSICAL EXAM:  General: NAD, Lying in bed comfortably  Neuro: Alert and answering questions appropriately   HEENT: Grossly normal, EOMI  Cardio: Regular rate  Resp: Good effort, no signs of respiratory distress  Vascular: RLE erythema and warmth extending onto foot - palpable R AT, R lateral malleolus ulcer  Musculoskeletal: All 4 extremities moving spontaneously, no limitations    --------------------------------------------------------------------------------------    I&Os  T(C): 36.2 (08-25-23 @ 05:21), Max: 36.8 (08-24-23 @ 17:28)  HR: 72 (08-25-23 @ 05:21) (68 - 86)  BP: 134/61 (08-25-23 @ 05:21) (126/70 - 170/59)  RR: 18 (08-25-23 @ 05:21) (13 - 18)  SpO2: 94% (08-25-23 @ 05:21) (90% - 99%)  --------------------------------------------------------------------------------------    LABS                          14.5   6.58  )-----------( 303      ( 23 Aug 2023 19:21 )             46.7     08-23    138  |  99  |  41<H>  ----------------------------<  98  4.1   |  25  |  0.79    Ca    9.5      23 Aug 2023 19:21    TPro  8.5<H>  /  Alb  4.2  /  TBili  0.4  /  DBili  x   /  AST  25  /  ALT  18  /  AlkPhos  105  08-23    PT/INR - ( 23 Aug 2023 19:21 )   PT: 11.1 sec;   INR: 1.01 ratio         PTT - ( 23 Aug 2023 19:21 )  PTT:30.5 sec  Urinalysis Basic - ( 23 Aug 2023 19:21 )    Color: x / Appearance: x / SG: x / pH: x  Gluc: 98 mg/dL / Ketone: x  / Bili: x / Urobili: x   Blood: x / Protein: x / Nitrite: x   Leuk Esterase: x / RBC: x / WBC x   Sq Epi: x / Non Sq Epi: x / Bacteria: x        08-24-23 @ 07:01  -  08-25-23 @ 07:00  --------------------------------------------------------  IN: 0 mL / OUT: 250 mL / NET: -250 mL      acetaminophen     Tablet .. 650 milliGRAM(s) Oral every 6 hours PRN  albuterol    90 MICROgram(s) HFA Inhaler 2 Puff(s) Inhalation every 6 hours  albuterol    90 MICROgram(s) HFA Inhaler 2 Puff(s) Inhalation every 6 hours PRN  albuterol/ipratropium for Nebulization 3 milliLiter(s) Nebulizer every 6 hours  aluminum hydroxide/magnesium hydroxide/simethicone Suspension 30 milliLiter(s) Oral every 4 hours PRN  aspirin enteric coated 81 milliGRAM(s) Oral daily  atorvastatin 20 milliGRAM(s) Oral at bedtime  clonazePAM  Tablet 0.5 milliGRAM(s) Oral every 12 hours PRN  doxycycline monohydrate Capsule 100 milliGRAM(s) Oral every 12 hours  gabapentin 100 milliGRAM(s) Oral every 8 hours  losartan 25 milliGRAM(s) Oral daily  melatonin 3 milliGRAM(s) Oral at bedtime PRN  mirtazapine 7.5 milliGRAM(s) Oral at bedtime  nicotine - 21 mG/24Hr(s) Patch 1 Patch Transdermal daily  NIFEdipine XL 60 milliGRAM(s) Oral daily  ondansetron Injectable 4 milliGRAM(s) IV Push every 8 hours PRN  oxycodone    5 mG/acetaminophen 325 mG 2 Tablet(s) Oral every 6 hours PRN  rivaroxaban 20 milliGRAM(s) Oral daily  senna 2 Tablet(s) Oral at bedtime  zolpidem 5 milliGRAM(s) Oral at bedtime PRN      RADIOLOGY, EKG & ADDITIONAL TESTS: Reviewed. 
Cardiovascular Disease Progress Note    Overnight events: No acute events overnight.  no new cardiac sx  Otherwise review of systems negative    Objective Findings:  T(C): 36.2 (08-25-23 @ 05:21), Max: 36.8 (08-24-23 @ 17:28)  HR: 72 (08-25-23 @ 05:21) (68 - 86)  BP: 134/61 (08-25-23 @ 05:21) (126/70 - 199/78)  RR: 18 (08-25-23 @ 05:21) (13 - 20)  SpO2: 94% (08-25-23 @ 05:21) (90% - 99%)  Wt(kg): --  Daily     Daily       Physical Exam:  Gen: NAD  HEENT: EOMI  CV: RRR, normal S1 + S2, 2/6 gardenia  Lungs: CTAB  Abd: soft, non-tender  Ext: No edema    Telemetry:    Laboratory Data:                        14.5   6.58  )-----------( 303      ( 23 Aug 2023 19:21 )             46.7     08-23    138  |  99  |  41<H>  ----------------------------<  98  4.1   |  25  |  0.79    Ca    9.5      23 Aug 2023 19:21    TPro  8.5<H>  /  Alb  4.2  /  TBili  0.4  /  DBili  x   /  AST  25  /  ALT  18  /  AlkPhos  105  08-23    PT/INR - ( 23 Aug 2023 19:21 )   PT: 11.1 sec;   INR: 1.01 ratio         PTT - ( 23 Aug 2023 19:21 )  PTT:30.5 sec          Inpatient Medications:  MEDICATIONS  (STANDING):  albuterol    90 MICROgram(s) HFA Inhaler 2 Puff(s) Inhalation every 6 hours  albuterol/ipratropium for Nebulization 3 milliLiter(s) Nebulizer every 6 hours  aspirin enteric coated 81 milliGRAM(s) Oral daily  atorvastatin 20 milliGRAM(s) Oral at bedtime  doxycycline monohydrate Capsule 100 milliGRAM(s) Oral every 12 hours  gabapentin 100 milliGRAM(s) Oral every 8 hours  losartan 25 milliGRAM(s) Oral daily  mirtazapine 7.5 milliGRAM(s) Oral at bedtime  nicotine - 21 mG/24Hr(s) Patch 1 Patch Transdermal daily  NIFEdipine XL 60 milliGRAM(s) Oral daily  rivaroxaban 20 milliGRAM(s) Oral daily  senna 2 Tablet(s) Oral at bedtime      Assessment:  77y F pmh EtOH use disorder, CAD, emphysema, HTN, HLD, seizure disorder presents with edema, DVT and need for further cardiac workup    Recs:  cardiac stable  s/p lhc, patent rca stent, no new obs cad  tte normal lv/rv fxn, moderate AS; currently doesnt appear volume up and suspect edema predominately 2/2 venous insufficiency and dvt = obtain cxr and cw lasix to 20mg po qd  carotid artery stenosis - vasc following, pending cta  venous duplex without dvt, outpatient duplex with right gastroc dvt = would repeat study  asa and statin for cad/pad  will follow        Over 25 minutes spent on total encounter; more than 50% of the visit was spent counseling and/or coordinating care by the attending physician.      Chaka Lawrence MD   Cardiovascular Disease  [phone]
Cardiovascular Disease Progress Note    Overnight events: No acute events overnight.  no new cardiac sx  Otherwise review of systems negative    Objective Findings:  T(C): 36.2 (08-28-23 @ 04:34), Max: 37 (08-27-23 @ 14:41)  HR: 58 (08-28-23 @ 04:34) (58 - 76)  BP: 131/61 (08-28-23 @ 04:34) (128/57 - 163/70)  RR: 18 (08-28-23 @ 04:34) (18 - 18)  SpO2: 92% (08-28-23 @ 04:34) (92% - 94%)  Wt(kg): --  Daily     Daily       Physical Exam:  Gen: NAD  HEENT: EOMI  CV: RRR, normal S1 + S2, no m/r/g  Lungs: CTAB  Abd: soft, non-tender  Ext: No edema    Telemetry:    Laboratory Data:                        13.5   6.94  )-----------( 191      ( 27 Aug 2023 14:41 )             41.2     08-27    137  |  98  |  37<H>  ----------------------------<  114<H>  4.5   |  29  |  1.47<H>    Ca    8.8      27 Aug 2023 14:41                Inpatient Medications:  MEDICATIONS  (STANDING):  albuterol    90 MICROgram(s) HFA Inhaler 2 Puff(s) Inhalation every 6 hours  albuterol/ipratropium for Nebulization 3 milliLiter(s) Nebulizer every 6 hours  aspirin enteric coated 81 milliGRAM(s) Oral daily  atorvastatin 20 milliGRAM(s) Oral at bedtime  collagenase Ointment 1 Application(s) Topical daily  doxycycline monohydrate Capsule 100 milliGRAM(s) Oral every 12 hours  furosemide    Tablet 20 milliGRAM(s) Oral daily  gabapentin 100 milliGRAM(s) Oral every 8 hours  losartan 25 milliGRAM(s) Oral daily  mirtazapine 7.5 milliGRAM(s) Oral at bedtime  nicotine - 21 mG/24Hr(s) Patch 1 Patch Transdermal daily  NIFEdipine XL 60 milliGRAM(s) Oral daily  rivaroxaban 20 milliGRAM(s) Oral daily  senna 2 Tablet(s) Oral at bedtime      Assessment:  77y F pmh EtOH use disorder, CAD, emphysema, HTN, HLD, seizure disorder presents with edema, DVT and need for further cardiac workup    Recs:  cardiac stable  s/p lhc, patent rca stent, no new obs cad  tte normal lv/rv fxn, moderate AS; currently doesnt appear volume up and suspect edema predominately 2/2 venous insufficiency and dvt = obtain cxr and cw dosing of lasix  carotid artery stenosis - vasc following, O f/u  venous duplex + for right gastroc dvt --> cw xarelto  asa and statin for cad/pad  dcp      Over 25 minutes spent on total encounter; more than 50% of the visit was spent counseling and/or coordinating care by the attending physician.      Chaka Lawrence MD   Cardiovascular Disease  [phone]
Cardiovascular Disease Progress Note    Overnight events: No acute events overnight.  no new cardiac sx  Otherwise review of systems negative    Objective Findings:  T(C): 36.7 (08-26-23 @ 05:03), Max: 36.7 (08-25-23 @ 21:29)  HR: 66 (08-26-23 @ 05:03) (66 - 74)  BP: 165/72 (08-26-23 @ 05:03) (123/73 - 165/72)  RR: 18 (08-26-23 @ 05:03) (18 - 18)  SpO2: 94% (08-26-23 @ 05:03) (88% - 94%)  Wt(kg): --  Daily     Daily       Physical Exam:  Gen: NAD  HEENT: EOMI  CV: RRR, normal S1 + S2,  2/6 gardenia  Lungs: CTAB  Abd: soft, non-tender  Ext: No edema    Telemetry:    Laboratory Data:                    Inpatient Medications:  MEDICATIONS  (STANDING):  albuterol    90 MICROgram(s) HFA Inhaler 2 Puff(s) Inhalation every 6 hours  albuterol/ipratropium for Nebulization 3 milliLiter(s) Nebulizer every 6 hours  aspirin enteric coated 81 milliGRAM(s) Oral daily  atorvastatin 20 milliGRAM(s) Oral at bedtime  collagenase Ointment 1 Application(s) Topical daily  doxycycline monohydrate Capsule 100 milliGRAM(s) Oral every 12 hours  furosemide    Tablet 20 milliGRAM(s) Oral daily  gabapentin 100 milliGRAM(s) Oral every 8 hours  losartan 25 milliGRAM(s) Oral daily  mirtazapine 7.5 milliGRAM(s) Oral at bedtime  nicotine - 21 mG/24Hr(s) Patch 1 Patch Transdermal daily  NIFEdipine XL 60 milliGRAM(s) Oral daily  rivaroxaban 20 milliGRAM(s) Oral daily  senna 2 Tablet(s) Oral at bedtime      Assessment:  77y F pmh EtOH use disorder, CAD, emphysema, HTN, HLD, seizure disorder presents with edema, DVT and need for further cardiac workup    Recs:  cardiac stable  s/p lhc, patent rca stent, no new obs cad  tte normal lv/rv fxn, moderate AS; currently doesnt appear volume up and suspect edema predominately 2/2 venous insufficiency and dvt = obtain cxr and cw dosing of lasix  carotid artery stenosis - vasc following, pending cta  venous duplex + for right gastroc dvt --> cw xarelto  asa and statin for cad/pad  will follow          Over 25 minutes spent on total encounter; more than 50% of the visit was spent counseling and/or coordinating care by the attending physician.      Chaka Lawrence MD   Cardiovascular Disease  [phone]
Patient is a 77y old  Female who presents with a chief complaint of DVT (25 Aug 2023 14:19)      SUBJECTIVE / OVERNIGHT EVENTS: Comfortable without new complaints.   Review of Systems  chest pain no  palpitations no  sob no  nausea no  headache no    MEDICATIONS  (STANDING):  albuterol    90 MICROgram(s) HFA Inhaler 2 Puff(s) Inhalation every 6 hours  albuterol/ipratropium for Nebulization 3 milliLiter(s) Nebulizer every 6 hours  aspirin enteric coated 81 milliGRAM(s) Oral daily  atorvastatin 20 milliGRAM(s) Oral at bedtime  collagenase Ointment 1 Application(s) Topical daily  doxycycline monohydrate Capsule 100 milliGRAM(s) Oral every 12 hours  gabapentin 100 milliGRAM(s) Oral every 8 hours  losartan 25 milliGRAM(s) Oral daily  mirtazapine 7.5 milliGRAM(s) Oral at bedtime  nicotine - 21 mG/24Hr(s) Patch 1 Patch Transdermal daily  NIFEdipine XL 60 milliGRAM(s) Oral daily  rivaroxaban 20 milliGRAM(s) Oral daily  senna 2 Tablet(s) Oral at bedtime    MEDICATIONS  (PRN):  acetaminophen     Tablet .. 650 milliGRAM(s) Oral every 6 hours PRN Temp greater or equal to 38C (100.4F), Mild Pain (1 - 3)  acetaminophen 325 mG/butalbital 50 mG/caffeine 40 mG 1 Tablet(s) Oral every 8 hours PRN migraine  albuterol    90 MICROgram(s) HFA Inhaler 2 Puff(s) Inhalation every 6 hours PRN for shortness of breath and/or wheezing  aluminum hydroxide/magnesium hydroxide/simethicone Suspension 30 milliLiter(s) Oral every 4 hours PRN Dyspepsia  clonazePAM  Tablet 0.5 milliGRAM(s) Oral every 12 hours PRN Anxiety  melatonin 3 milliGRAM(s) Oral at bedtime PRN Insomnia  ondansetron Injectable 4 milliGRAM(s) IV Push every 8 hours PRN Nausea and/or Vomiting  oxycodone    5 mG/acetaminophen 325 mG 2 Tablet(s) Oral every 6 hours PRN Moderate Pain (4 - 6)  zolpidem 5 milliGRAM(s) Oral at bedtime PRN Insomnia      Vital Signs Last 24 Hrs  T(C): 36.2 (25 Aug 2023 11:41), Max: 36.6 (24 Aug 2023 22:00)  T(F): 97.2 (25 Aug 2023 11:41), Max: 97.8 (24 Aug 2023 22:00)  HR: 70 (25 Aug 2023 11:50) (70 - 77)  BP: 123/73 (25 Aug 2023 11:50) (123/73 - 159/70)  BP(mean): --  RR: 18 (25 Aug 2023 11:41) (18 - 18)  SpO2: 94% (25 Aug 2023 11:50) (88% - 94%)    Parameters below as of 25 Aug 2023 11:50  Patient On (Oxygen Delivery Method): nasal cannula  O2 Flow (L/min): 2      PHYSICAL EXAM:  GENERAL: NAD  HEAD:  Atraumatic, Normocephalic  EYES: EOMI, PERRLA, conjunctiva and sclera clear  NECK: Supple, No JVD  CHEST/LUNG: Clear to auscultation bilaterally; No wheeze  HEART: Regular rate and rhythm; No murmurs, rubs, or gallops  ABDOMEN: Soft, Nontender, Nondistended; Bowel sounds present  EXTREMITIES:  R ankle wound   PSYCH: AAOx3  NEUROLOGY: non-focal  SKIN: diffuse rashes on arms legs    LABS:                      RADIOLOGY & ADDITIONAL TESTS:    Imaging Personally Reviewed:    Consultant(s) Notes Reviewed:      Care Discussed with Consultants/Other Providers:  
Patient is a 77y old  Female who presents with a chief complaint of DVT (26 Aug 2023 08:51)      SUBJECTIVE / OVERNIGHT EVENTS: No new complaints.   Review of Systems  chest pain no  palpitations no  sob no  nausea no  headache no    MEDICATIONS  (STANDING):  albuterol    90 MICROgram(s) HFA Inhaler 2 Puff(s) Inhalation every 6 hours  albuterol/ipratropium for Nebulization 3 milliLiter(s) Nebulizer every 6 hours  aspirin enteric coated 81 milliGRAM(s) Oral daily  atorvastatin 20 milliGRAM(s) Oral at bedtime  collagenase Ointment 1 Application(s) Topical daily  doxycycline monohydrate Capsule 100 milliGRAM(s) Oral every 12 hours  furosemide    Tablet 20 milliGRAM(s) Oral daily  gabapentin 100 milliGRAM(s) Oral every 8 hours  losartan 25 milliGRAM(s) Oral daily  mirtazapine 7.5 milliGRAM(s) Oral at bedtime  nicotine - 21 mG/24Hr(s) Patch 1 Patch Transdermal daily  NIFEdipine XL 60 milliGRAM(s) Oral daily  rivaroxaban 20 milliGRAM(s) Oral daily  senna 2 Tablet(s) Oral at bedtime    MEDICATIONS  (PRN):  acetaminophen     Tablet .. 650 milliGRAM(s) Oral every 6 hours PRN Temp greater or equal to 38C (100.4F), Mild Pain (1 - 3)  acetaminophen 325 mG/butalbital 50 mG/caffeine 40 mG 1 Tablet(s) Oral every 8 hours PRN migraine  albuterol    90 MICROgram(s) HFA Inhaler 2 Puff(s) Inhalation every 6 hours PRN for shortness of breath and/or wheezing  aluminum hydroxide/magnesium hydroxide/simethicone Suspension 30 milliLiter(s) Oral every 4 hours PRN Dyspepsia  clonazePAM  Tablet 0.5 milliGRAM(s) Oral every 12 hours PRN Anxiety  melatonin 3 milliGRAM(s) Oral at bedtime PRN Insomnia  ondansetron Injectable 4 milliGRAM(s) IV Push every 8 hours PRN Nausea and/or Vomiting  oxycodone    5 mG/acetaminophen 325 mG 2 Tablet(s) Oral every 6 hours PRN Moderate Pain (4 - 6)  zolpidem 5 milliGRAM(s) Oral at bedtime PRN Insomnia      Vital Signs Last 24 Hrs  T(C): 36.7 (26 Aug 2023 12:33), Max: 36.7 (25 Aug 2023 21:29)  T(F): 98 (26 Aug 2023 12:33), Max: 98 (25 Aug 2023 21:29)  HR: 70 (26 Aug 2023 12:33) (66 - 74)  BP: 107/60 (26 Aug 2023 12:33) (107/60 - 165/72)  BP(mean): --  RR: 18 (26 Aug 2023 12:33) (18 - 18)  SpO2: 92% (26 Aug 2023 12:33) (92% - 94%)    Parameters below as of 26 Aug 2023 12:33  Patient On (Oxygen Delivery Method): room air        PHYSICAL EXAM:  GENERAL: NAD   HEAD:  Atraumatic, Normocephalic  EYES: EOMI, PERRLA, conjunctiva and sclera clear  NECK: Supple, No JVD  CHEST/LUNG: Clear to auscultation bilaterally; No wheeze  HEART: Regular rate and rhythm; No murmurs, rubs, or gallops  ABDOMEN: Soft, Nontender, Nondistended; Bowel sounds present  EXTREMITIES:  2+ Peripheral Pulses, No clubbing, cyanosis, or edema  PSYCH: AAOx3  NEUROLOGY: non-focal  SKIN: chronic rashes on arms legs    LABS:                      RADIOLOGY & ADDITIONAL TESTS:    Imaging Personally Reviewed:  < from: CT Angio Neck w/ IV Cont (08.25.23 @ 17:53) >  IMPRESSION:    CT HEAD: No evidence of acute hemorrhage, territorial infarction, mass   effect or midline shift, mass effect, or midline shift.    CTA BRAIN: No evidence of large vessel occlusion, aneurysm or vascular   malformation.    CTA NECK:  Severe focal stenosis origin left vertebral artery as well as   severe focal stenosis at the C6 level.  Right vertebral artery patent and dominant.    Approximate 70% stenosis origin right common carotid artery. Calcified   plaque extending from the right carotid bulb into the ipsilateral   origin-mid right internal carotid artery, resulting in up to   approximately 50-60 % focal stenosis right internal carotid artery at the   approximate C3-C4 disc space level.    Approximate 60% stenosis left proximal common carotid artery. Partially   calcified plaque extending from the left carotid bulb into the   ipsilateral origin-proximal internal carotid artery, resulting in up to   approximately 60-70% stenosis focal stenosis left internal carotid artery   origin, centered at the C4-C5 disc space level, as well as up to   approximately 50-60% focal stenosis left internal carotid artery at the   approximate C3-C4 disc space level. An approximate 2 x 2 x 2 mm ulcerated   plaque emanates superolaterally just distal to the latter stenosis.    No evidence of extracranial carotid occlusion or dissection.    Additional findings described in detail above.    < end of copied text >    Consultant(s) Notes Reviewed:      Care Discussed with Consultants/Other Providers:  
Patient is a 77y old  Female who presents with a chief complaint of DVT (28 Aug 2023 07:17)      SUBJECTIVE / OVERNIGHT EVENTS: Comfortable without new complaints. Wants to go home.   Review of Systems  chest pain no  palpitations no  sob no  nausea no  headache no    MEDICATIONS  (STANDING):  albuterol    90 MICROgram(s) HFA Inhaler 2 Puff(s) Inhalation every 6 hours  albuterol/ipratropium for Nebulization 3 milliLiter(s) Nebulizer every 6 hours  aspirin enteric coated 81 milliGRAM(s) Oral daily  atorvastatin 20 milliGRAM(s) Oral at bedtime  collagenase Ointment 1 Application(s) Topical daily  doxycycline monohydrate Capsule 100 milliGRAM(s) Oral every 12 hours  furosemide    Tablet 20 milliGRAM(s) Oral daily  gabapentin 100 milliGRAM(s) Oral every 8 hours  losartan 25 milliGRAM(s) Oral daily  mirtazapine 7.5 milliGRAM(s) Oral at bedtime  nicotine - 21 mG/24Hr(s) Patch 1 Patch Transdermal daily  NIFEdipine XL 60 milliGRAM(s) Oral daily  rivaroxaban 20 milliGRAM(s) Oral daily  senna 2 Tablet(s) Oral at bedtime    MEDICATIONS  (PRN):  acetaminophen     Tablet .. 650 milliGRAM(s) Oral every 6 hours PRN Temp greater or equal to 38C (100.4F), Mild Pain (1 - 3)  acetaminophen 325 mG/butalbital 50 mG/caffeine 40 mG 1 Tablet(s) Oral every 8 hours PRN migraine  albuterol    90 MICROgram(s) HFA Inhaler 2 Puff(s) Inhalation every 6 hours PRN for shortness of breath and/or wheezing  aluminum hydroxide/magnesium hydroxide/simethicone Suspension 30 milliLiter(s) Oral every 4 hours PRN Dyspepsia  clonazePAM  Tablet 0.5 milliGRAM(s) Oral every 12 hours PRN Anxiety  melatonin 3 milliGRAM(s) Oral at bedtime PRN Insomnia  ondansetron Injectable 4 milliGRAM(s) IV Push every 8 hours PRN Nausea and/or Vomiting  oxycodone    5 mG/acetaminophen 325 mG 2 Tablet(s) Oral every 6 hours PRN Moderate Pain (4 - 6)  zolpidem 5 milliGRAM(s) Oral at bedtime PRN Insomnia      Vital Signs Last 24 Hrs  T(C): 36.9 (28 Aug 2023 12:09), Max: 36.9 (28 Aug 2023 12:09)  T(F): 98.4 (28 Aug 2023 12:09), Max: 98.4 (28 Aug 2023 12:09)  HR: 65 (28 Aug 2023 12:09) (58 - 65)  BP: 112/71 (28 Aug 2023 12:09) (112/71 - 131/61)  BP(mean): --  RR: 18 (28 Aug 2023 12:09) (18 - 18)  SpO2: 92% (28 Aug 2023 12:09) (92% - 94%)    Parameters below as of 28 Aug 2023 12:09  Patient On (Oxygen Delivery Method): room air        PHYSICAL EXAM:  GENERAL: NAD  HEAD:  Atraumatic, Normocephalic  EYES: EOMI, PERRLA, conjunctiva and sclera clear  NECK: Supple, No JVD  CHEST/LUNG: Clear to auscultation bilaterally; No wheeze  HEART: Regular rate and rhythm; No murmurs, rubs, or gallops  ABDOMEN: Soft, Nontender, Nondistended; Bowel sounds present  EXTREMITIES:  2+ Peripheral Pulses, No clubbing, cyanosis, or edema  PSYCH: AAOx3   NEUROLOGY: non-focal  SKIN: No rashes or lesions    LABS:                        13.5   6.94  )-----------( 191      ( 27 Aug 2023 14:41 )             41.2     08-27    137  |  98  |  37<H>  ----------------------------<  114<H>  4.5   |  29  |  1.47<H>    Ca    8.8      27 Aug 2023 14:41            Urinalysis Basic - ( 27 Aug 2023 14:41 )    Color: x / Appearance: x / SG: x / pH: x  Gluc: 114 mg/dL / Ketone: x  / Bili: x / Urobili: x   Blood: x / Protein: x / Nitrite: x   Leuk Esterase: x / RBC: x / WBC x   Sq Epi: x / Non Sq Epi: x / Bacteria: x          RADIOLOGY & ADDITIONAL TESTS:    Imaging Personally Reviewed:    Consultant(s) Notes Reviewed:      Care Discussed with Consultants/Other Providers:

## 2023-08-28 NOTE — PROGRESS NOTE ADULT - ASSESSMENT
77y F pmh EtOH use disorder, CAD, emphysema, HTN, HLD, seizure disorder sent to ED for DVT and positive stress test, needing further eval     DVT, lower extremity.   - c/w Xarelto     PAD (peripheral artery disease).   - s/p ANDREA/PVR:  Moderate b/l LE arterial flow limitation localizing to the popliteal and infrapopliteal levels  - C/w Xarelto  - ASA, Statin   - Vascular consult noted    Chronic diastolic heart failure.   - hx of diastolic HF w/ mild pulm HTN, on lasix 40mg at home  - Echo (4/22): EF 75% w/ stage 2 diastolic function  - diurese    HTN (hypertension).   - C/w home meds.    Emphysema/COPD.   - Inhalers.    Pain due to right hip joint prosthesis.   - Hx/o Rt hip joint OM, s/p MRSA bacteremia   - Eval by orhto in past, not surgical candidate   - On chronic abx, c/w Doxycycline.    Carotid stenosis  - Vascular follow. No intervention now.  - CTa head and neck noted  - ASA/ Statin    Skin erythema   - Dermatology evaluation noted    DCP home. Refuses rehab.  Follow with PMD/ Cardiology/ Vascular surgery in 3-4 days    Donald Wooten MD phone [phone]

## 2023-12-12 NOTE — PROGRESS NOTE ADULT - PROBLEM SELECTOR PLAN 2
Per pt, wants second opinion and location closer to his home d/t dx prostate cancer.   BCLX NGTD. Has PICC line from [facility]  - C/w Vancomycin given suspicion of chronic OM   - TTE negative for vegetations  - F/u with random Vanc level in am. Hold Vancomycin 750mg q24 for now until am vancomycin results (elevated levels, likely taken during IV infusion)  - spoke with daughter 4/17 to obtain OSH records

## 2024-01-17 NOTE — DIETITIAN INITIAL EVALUATION ADULT - NUTRITION DIAGNOSIS
Subjective     Patient ID: Silke Greenberg is a 69 y.o. female.    Chief Complaint: Tinnitus (Tinnitus and hearing loss in L ear. Audio results.)    Patient is a pleasant 69 year old female here to see me today for recheck of her ears and discuss results of audiogram.  She was last here on 11/24/23 with Dr. Lewis and had bilateral serous CORINNE.  She was treated with Flonase and Afrin.  She feels like her hearing is back to baseline now.  No ear pain or drainage.  She has some popping in the right ear and occasional tinnitus (faint ringing) in the left ear.  She is now using Flonase PRN (not daily).  No nasal drainage or fever.      Review of Systems   Constitutional:  Negative for fever.   HENT:  Positive for tinnitus. Negative for ear discharge, ear pain (popping AD) and hearing loss.    Neurological:  Negative for dizziness.          Objective     Physical Exam  Constitutional:       General: She is not in acute distress.     Appearance: She is well-developed. She is not ill-appearing.   HENT:      Head: Normocephalic and atraumatic.      Right Ear: Ear canal and external ear normal. No drainage. No middle ear effusion. Tympanic membrane is retracted. Tympanic membrane is not injected or erythematous.      Left Ear: Ear canal and external ear normal. No drainage.  No middle ear effusion. Tympanic membrane is retracted. Tympanic membrane is not injected or erythematous.      Nose: Nose normal. No mucosal edema or rhinorrhea.      Mouth/Throat:      Mouth: Mucous membranes are moist. Mucous membranes are not pale and not dry.      Pharynx: Uvula midline. No oropharyngeal exudate or posterior oropharyngeal erythema.   Eyes:      General: Lids are normal. No scleral icterus.     Extraocular Movements:      Right eye: Normal extraocular motion and no nystagmus.      Left eye: Normal extraocular motion and no nystagmus.      Conjunctiva/sclera: Conjunctivae normal.      Right eye: Right conjunctiva is not injected.  No chemosis.     Left eye: Left conjunctiva is not injected. No chemosis.     Pupils: Pupils are equal, round, and reactive to light.   Neck:      Trachea: Trachea and phonation normal. No tracheal tenderness.   Pulmonary:      Effort: Pulmonary effort is normal. No respiratory distress.   Lymphadenopathy:      Head:      Right side of head: No preauricular or posterior auricular adenopathy.      Left side of head: No preauricular or posterior auricular adenopathy.      Cervical: No cervical adenopathy.   Skin:     General: Skin is warm and dry.      Findings: No erythema or rash.   Neurological:      Mental Status: She is alert and oriented to person, place, and time.      Cranial Nerves: No cranial nerve deficit.   Psychiatric:         Behavior: Behavior normal. Behavior is cooperative.       AUDIOGRAM:         Assessment and Plan     1. Sensorineural hearing loss (SNHL) of both ears    2. Tinnitus of left ear    3. ETD (Eustachian tube dysfunction), bilateral      CORINNE AU - now resolved but she still has significant negative pressure AU (ETD).    We reviewed the patient's recent audiogram and hearing loss in detail.  We also discussed that she is a excellent candidate for hearing aids, if and when she the patient is motivated.  She was given handouts with information and pricing of hearing aids, and will contact audiology when ready to proceed.  We also discussed the use hearing protection when exposed to loud noise, including lawn equipment.     We reviewed her audiogram together in detail.  We also discussed that tinnitus is most often caused by a hearing loss, and that as the hair cells are damaged, either genetic or as a result of loud noise exposure, they then cause tinnitus.  Some patients find that restricting the salt or caffeine in their diet helps, and there is also an OTC supplement, lipoflavinoids, that some people find to be effective though their benefit is not fully proven.  Tinnitus tends to be  louder in times of stress and fatigue, and may decrease with time.  Sound machines may also be an effective masking technique if needed at night.    We had a long discussion regarding the anatomy and function of the eustachian tube.  We discussed that the eustachian tube acts as a pump to keep the appropriate amount of pressure behind the ear drum.  I recommend she continue to use her prescription for a nasal steroid spray to be used on a daily basis, and we discussed that it will take 2-3 weeks of daily use to achieve maximal effectiveness.           No follow-ups on file.     yes...

## 2024-01-17 NOTE — ED ADULT NURSE NOTE - PRO INTERPRETER NEED 2
Spoke to patient to inform her that her medications were send to the pharmacy. Patient verbalized understanding.    English

## 2024-02-22 NOTE — BRIEF OPERATIVE NOTE - IV INFUSIONS - COLLOIDS
"Bariatric Nutrition Assessment Note    Type of surgery    Preop 6 months required  Surgery Date: Tentative August-September 2024  Deadline December 2024  Surgeon: Dr. Heydi Gilbert  Interested in sleeve gastrectomy    Nutrition Assessment   Oswald Marvin  48 y.o.  male   Wt with BMI of 25: 170.54  Pre-Op Excess Wt: 86.16lbs  Wt Readings from Last 1 Encounters:   02/23/24 116 kg (256 lb 11.2 oz)     Ht Readings from Last 1 Encounters:   02/23/24 5' 9.21\" (1.758 m)     BMI Readings from Last 1 Encounters:   02/23/24 37.68 kg/m²     NAFLD Fibrosis Score is: -2.105=F0-F2=Hepatology not needed    NAFLD Score Correlated Fibrosis Severity   <-1.455 F0-F2   -1.455-0.676 Indeterminate Score   >0.676 F3-F4   **Fibrosis Severity Scale: F0 = no fibrosis; F1= mild fibrosis; F2 = moderate fibrosis; F3 = severe fibrosis; F4 = cirrhosis    NAFLD Score Component Values:  Component Value Date   Age: 48 y.o.     BMI: 37.68 kg/m²    IFG or DM: Yes    AST: 17 U/L 3/11/2023   ALT: 42 U/L 3/11/2023   Platelet: 341 Thousands/uL 2/23/2024   Albumin: 4.2 g/dL 3/11/2023     Cole Matt Equation:    BMR= 2041kcal  Weight Maintenance= 2449kcal  Estimated calories for weight loss 1449-1949kcal (1-2# per wk wt loss - sedentary)  Estimated protein needs 77.5-116.3g (1.0-1.5 gms/kg IBW)  Estimated fluid needs 2325-2713ml(30-35 ml/kg IBW)    Weight History   Onset of Obesity: Adult-about 10 years ago when he stopped working at a very physically demanding job.  Family history of obesity: Yes: mother had RNY bariatric surgery, passed away one year later.  Wt Loss Attempts: Counseling with  MD  Exercise  OTC meds/supplements  Self Created Diets (Portion Control, Healthy Food Choices, etc.)  Patient has tried the above for 6 months or more with insufficient weight loss or weight regain, which is why patient has requested to be evaluated for weight loss surgery today  Maximum Wt Lost: [employer] exercise and supplements- got down to " 230lbs    Review of History and Medications   Past Medical History:   Diagnosis Date    Colon polyp     Diabetes (HCC)     Hyperlipidemia     YURIY on CPAP      Past Surgical History:   Procedure Laterality Date    COLONOSCOPY      FL LAPS ABD PRTM&OMENTUM DX W/WO SPEC BR/WA SPX N/A 08/16/2022    Procedure: LAPAROSCOPY DIAGNOSTIC, LYSIS OF ADHESIONS, REDUCTION OF INTERNAL HERNIA;  Surgeon: Raymond Núñez MD;  Location: [facility];  Service: General    VASECTOMY       Social History     Socioeconomic History    Marital status: /Civil Union     Spouse name: Not on file    Number of children: Not on file    Years of education: Not on file    Highest education level: Not on file   Occupational History    Not on file   Tobacco Use    Smoking status: Former     Current packs/day: 3.00     Types: Cigarettes    Smokeless tobacco: Never    Tobacco comments:     8 YRS AGO   Vaping Use    Vaping status: Never Used   Substance and Sexual Activity    Alcohol use: Never    Drug use: Never    Sexual activity: Yes   Other Topics Concern    Not on file   Social History Narrative    Not on file     Social Determinants of Health     Financial Resource Strain: Not on file   Food Insecurity: No Food Insecurity (6/21/2019)    Received from [facility]    Hunger Vital Sign     Worried About Running Out of Food in the Last Year: Never true     Ran Out of Food in the Last Year: Never true   Transportation Needs: Not on file   Physical Activity: Not on file   Stress: Not on file   Social Connections: Not on file   Intimate Partner Violence: Not on file   Housing Stability: Not on file       Current Outpatient Medications:     atorvastatin (LIPITOR) 20 mg tablet, Take 1 tablet (20 mg total) by mouth daily, Disp: 90 tablet, Rfl: 1    Blood Glucose Monitoring Suppl (OneTouch Verio Reflect) w/Device KIT, Check blood sugars once daily. Please substitute with appropriate alternative as covered by patient's insurance. Dx: E11.65, Disp: 1 kit,  Rfl: 0    dulaglutide (Trulicity) 0.75 MG/0.5ML injection, Inject 0.5 mL (0.75 mg total) under the skin every 7 days, Disp: 6 mL, Rfl: 1    glucose blood (OneTouch Verio) test strip, Check blood sugars once daily. Please substitute with appropriate alternative as covered by patient's insurance. Dx: E11.65, Disp: 100 each, Rfl: 3    ketoconazole (NIZORAL) 2 % cream, Apply topically daily, Disp: 30 g, Rfl: 0    metFORMIN (GLUCOPHAGE) 1000 MG tablet, Take 1 tablet (1,000 mg total) by mouth 2 (two) times a day, Disp: 180 tablet, Rfl: 1    omeprazole (PriLOSEC) 40 MG capsule, Take 1 capsule (40 mg total) by mouth daily before breakfast, Disp: 90 capsule, Rfl: 3    OneTouch Delica Lancets 33G MISC, Check blood sugars once daily. Please substitute with appropriate alternative as covered by patient's insurance. Dx: E11.65, Disp: 100 each, Rfl: 3    venlafaxine (EFFEXOR-XR) 75 mg 24 hr capsule, TAKE 1 CAPSULE BY MOUTH DAILY WITH BREAKFAST., Disp: 30 capsule, Rfl: 5    Food Intake and Lifestyle Assessment   Food Intake Assessment completed via usual diet recall  Gave up sugary foods and drinks for lent  Works 7am to 6-10pm  Breakfast: skips  Snack: none   Lunch: noon: on the road: restaurant- [employer] or [employer]  Snack: maybe chips or ice cream  Dinner: on the road: same as lunch  Snack: bowl of ice cream most nights  Beverage intake: If dieting: water and unsweet tea.  If not dieting: sweet tea and regular soda.  Protein supplement: just bought protein drinks, has not started yet  Estimated protein intake per day: difficult to assess with current food recall  Estimated fluid intake per day: 1 gallon of water per day  Meals eaten away from home: all of them=14 per week  Typical meal pattern: 2 meals per day and 2 snacks per day  Eating Behaviors: Consumption of high calorie/ high fat foods, Consumption of high calorie beverages, Large portion sizes, Craves sweet foods: reports ice cream is his favorite.  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  His son will be taking over his job while he is recovering from his bariatric surgery.     Nutrition Diagnosis  Diagnosis: Overweight / Obesity (NC-3.3), Excessive energy intake (NI-1.5), Inappropriate intake of carbohydrates (NI-5.8.3) and Undesirable food choices (NB-1.7)  Related to: Limited adherence to nutrition-related recommendations, Physical inactivity and Excessive energy intake  As Evidenced by: BMI >25, Excessive energy intake and Unintentional weight gain      Nutrition Prescription: Recommend the following diet  Low fat, Low sugar, High protein, Regular, and consistent carbohydrate    Interventions and Teaching   Discussed pre-op and post-op nutrition guidelines.       Patient educated and handouts provided.  Surgical changes to stomach / GI  Capacity of post-surgery stomach  Diet progression  Adequate hydration  Sugar and fat restriction to decrease \"dumping syndrome\"  Exercise  Suggestions for pre-op diet  Nutrition considerations after surgery  Protein supplements  Dietary and lifestyle changes  Possible problems with poor eating habits  Techniques for self monitoring and keeping daily food journal  Potential for food intolerance after surgery, and ways to deal with them including: lactose intolerance, nausea, reflux, vomiting, diarrhea, food intolerance, appetite changes, gas  Vitamin / Mineral supplementation of Multivitamin with minerals, Calcium, Vitamin B12, and Iron  Pt reports he currently takes a [employer] OTC MVI and vitamin B1    Education provided to: patient  Barriers " 1500cc RL to learning: No barriers identified  Readiness to change: preparation  Prior research on procedure: discussed with provider and friends or family  Comprehension: needs reinforcement and verbalizes understanding   Expected Compliance: good    Recommendations  Pt is an appropriate candidate for surgery. Yes    NAFLD Fibrosis Score is: -1.646=F0-F2=Hepatology not needed    CBC, CMP, HgbA1c done 2/23/24.  3/11/2023: Lipids+TSH done- needs updated.  Ordered 2/23/24    Minimum BMI of 35= 238.75lbs    Evaluation / Monitoring  Dietitian to Monitor: Eating pattern as discussed Tolerance of nutrition prescription Body weight Lab values Physical activity Bowel pattern    Goals  Eliminate sugar sweetened beverages, Food journal, Exercise 30 minutes 5 times per week, Complete lession plans 1-6, Eat 3 meals per day, and Eliminate mindless snacking    Time Spent:   1 Hour

## 2024-06-07 NOTE — DISCHARGE NOTE ADULT - NS MD DC FALL RISK RISK
EICU FOLLOWUP NOTE:    Discussed with epileptic pathologist Dr. Simental, ongoing seizures based on EEG.  Already loaded with Keppra.  Recommended to repeat dose of Ativan and start Depakote load followed by maintenance dose.  Currently on oxygen at 2 L per min.  --Ativan ordered. Monitor respiratory status closely  --Depakote loading dose/maintenance dose ordered as recommended  --Follow LFTs/CBC monitored for side effects of Depakote    Scooter Broderick MD  [facility] [place]  [phone]     For information on Fall & Injury Prevention, visit [email or web address]

## 2024-06-10 ENCOUNTER — INPATIENT (INPATIENT)
Facility: HOSPITAL | Age: 79
LOS: 15 days | Discharge: NURSING, REHAB OR LONG-TERM CARE | End: 2024-06-26
Attending: STUDENT IN AN ORGANIZED HEALTH CARE EDUCATION/TRAINING PROGRAM | Admitting: STUDENT IN AN ORGANIZED HEALTH CARE EDUCATION/TRAINING PROGRAM
Payer: MEDICARE

## 2024-06-10 VITALS
SYSTOLIC BLOOD PRESSURE: 146 MMHG | HEART RATE: 96 BPM | WEIGHT: 115.08 LBS | RESPIRATION RATE: 18 BRPM | DIASTOLIC BLOOD PRESSURE: 67 MMHG | OXYGEN SATURATION: 97 % | TEMPERATURE: 98 F

## 2024-06-10 DIAGNOSIS — Z98.890 OTHER SPECIFIED POSTPROCEDURAL STATES: Chronic | ICD-10-CM

## 2024-06-10 DIAGNOSIS — Z96.641 PRESENCE OF RIGHT ARTIFICIAL HIP JOINT: Chronic | ICD-10-CM

## 2024-06-10 DIAGNOSIS — Z98.89 OTHER SPECIFIED POSTPROCEDURAL STATES: Chronic | ICD-10-CM

## 2024-06-10 LAB
ADD ON TEST-SPECIMEN IN LAB: SIGNIFICANT CHANGE UP
ALBUMIN SERPL ELPH-MCNC: 3.2 G/DL — LOW (ref 3.3–5)
ALP SERPL-CCNC: 77 U/L — SIGNIFICANT CHANGE UP (ref 40–120)
ALT FLD-CCNC: 15 U/L — SIGNIFICANT CHANGE UP (ref 4–33)
ANION GAP SERPL CALC-SCNC: 14 MMOL/L — SIGNIFICANT CHANGE UP (ref 7–14)
APTT BLD: 32.5 SEC — SIGNIFICANT CHANGE UP (ref 24.5–35.6)
AST SERPL-CCNC: 32 U/L — SIGNIFICANT CHANGE UP (ref 4–32)
BASOPHILS # BLD AUTO: 0.03 K/UL — SIGNIFICANT CHANGE UP (ref 0–0.2)
BASOPHILS NFR BLD AUTO: 0.3 % — SIGNIFICANT CHANGE UP (ref 0–2)
BILIRUB SERPL-MCNC: 0.2 MG/DL — SIGNIFICANT CHANGE UP (ref 0.2–1.2)
BLD GP AB SCN SERPL QL: NEGATIVE — SIGNIFICANT CHANGE UP
BUN SERPL-MCNC: 16 MG/DL — SIGNIFICANT CHANGE UP (ref 7–23)
CALCIUM SERPL-MCNC: 8.6 MG/DL — SIGNIFICANT CHANGE UP (ref 8.4–10.5)
CHLORIDE SERPL-SCNC: 102 MMOL/L — SIGNIFICANT CHANGE UP (ref 98–107)
CO2 SERPL-SCNC: 21 MMOL/L — LOW (ref 22–31)
CREAT SERPL-MCNC: 0.77 MG/DL — SIGNIFICANT CHANGE UP (ref 0.5–1.3)
EGFR: 80 ML/MIN/1.73M2 — SIGNIFICANT CHANGE UP
EOSINOPHIL # BLD AUTO: 0.02 K/UL — SIGNIFICANT CHANGE UP (ref 0–0.5)
EOSINOPHIL NFR BLD AUTO: 0.2 % — SIGNIFICANT CHANGE UP (ref 0–6)
GLUCOSE SERPL-MCNC: 82 MG/DL — SIGNIFICANT CHANGE UP (ref 70–99)
HCT VFR BLD CALC: 41.8 % — SIGNIFICANT CHANGE UP (ref 34.5–45)
HGB BLD-MCNC: 13.5 G/DL — SIGNIFICANT CHANGE UP (ref 11.5–15.5)
IANC: 9.19 K/UL — HIGH (ref 1.8–7.4)
IMM GRANULOCYTES NFR BLD AUTO: 0.5 % — SIGNIFICANT CHANGE UP (ref 0–0.9)
INR BLD: 1 RATIO — SIGNIFICANT CHANGE UP (ref 0.85–1.18)
LYMPHOCYTES # BLD AUTO: 0.36 K/UL — LOW (ref 1–3.3)
LYMPHOCYTES # BLD AUTO: 3.4 % — LOW (ref 13–44)
MCHC RBC-ENTMCNC: 32.3 GM/DL — SIGNIFICANT CHANGE UP (ref 32–36)
MCHC RBC-ENTMCNC: 32.6 PG — SIGNIFICANT CHANGE UP (ref 27–34)
MCV RBC AUTO: 101 FL — HIGH (ref 80–100)
MONOCYTES # BLD AUTO: 0.86 K/UL — SIGNIFICANT CHANGE UP (ref 0–0.9)
MONOCYTES NFR BLD AUTO: 8.2 % — SIGNIFICANT CHANGE UP (ref 2–14)
NEUTROPHILS # BLD AUTO: 9.19 K/UL — HIGH (ref 1.8–7.4)
NEUTROPHILS NFR BLD AUTO: 87.4 % — HIGH (ref 43–77)
NRBC # BLD: 0 /100 WBCS — SIGNIFICANT CHANGE UP (ref 0–0)
NRBC # FLD: 0 K/UL — SIGNIFICANT CHANGE UP (ref 0–0)
PLATELET # BLD AUTO: 191 K/UL — SIGNIFICANT CHANGE UP (ref 150–400)
POTASSIUM SERPL-MCNC: 3.7 MMOL/L — SIGNIFICANT CHANGE UP (ref 3.5–5.3)
POTASSIUM SERPL-SCNC: 3.7 MMOL/L — SIGNIFICANT CHANGE UP (ref 3.5–5.3)
PROT SERPL-MCNC: 6.6 G/DL — SIGNIFICANT CHANGE UP (ref 6–8.3)
PROTHROM AB SERPL-ACNC: 11.3 SEC — SIGNIFICANT CHANGE UP (ref 9.5–13)
RBC # BLD: 4.14 M/UL — SIGNIFICANT CHANGE UP (ref 3.8–5.2)
RBC # FLD: 13.7 % — SIGNIFICANT CHANGE UP (ref 10.3–14.5)
RH IG SCN BLD-IMP: POSITIVE — SIGNIFICANT CHANGE UP
SODIUM SERPL-SCNC: 137 MMOL/L — SIGNIFICANT CHANGE UP (ref 135–145)
WBC # BLD: 10.51 K/UL — HIGH (ref 3.8–10.5)
WBC # FLD AUTO: 10.51 K/UL — HIGH (ref 3.8–10.5)

## 2024-06-10 PROCEDURE — 73502 X-RAY EXAM HIP UNI 2-3 VIEWS: CPT | Mod: 26,RT

## 2024-06-10 PROCEDURE — 93970 EXTREMITY STUDY: CPT | Mod: 26

## 2024-06-10 PROCEDURE — 99285 EMERGENCY DEPT VISIT HI MDM: CPT

## 2024-06-10 RX ORDER — MORPHINE SULFATE 100 MG/1
4 TABLET, EXTENDED RELEASE ORAL ONCE
Refills: 0 | Status: DISCONTINUED | OUTPATIENT
Start: 2024-06-10 | End: 2024-06-10

## 2024-06-10 RX ORDER — ACETAMINOPHEN 325 MG
1000 TABLET ORAL ONCE
Refills: 0 | Status: COMPLETED | OUTPATIENT
Start: 2024-06-10 | End: 2024-06-10

## 2024-06-10 RX ADMIN — Medication 400 MILLIGRAM(S): at 21:43

## 2024-06-10 RX ADMIN — MORPHINE SULFATE 4 MILLIGRAM(S): 100 TABLET, EXTENDED RELEASE ORAL at 21:43

## 2024-06-11 DIAGNOSIS — F10.10 ALCOHOL ABUSE, UNCOMPLICATED: ICD-10-CM

## 2024-06-11 DIAGNOSIS — Z29.9 ENCOUNTER FOR PROPHYLACTIC MEASURES, UNSPECIFIED: ICD-10-CM

## 2024-06-11 DIAGNOSIS — A41.9 SEPSIS, UNSPECIFIED ORGANISM: ICD-10-CM

## 2024-06-11 DIAGNOSIS — I50.32 CHRONIC DIASTOLIC (CONGESTIVE) HEART FAILURE: ICD-10-CM

## 2024-06-11 DIAGNOSIS — I73.9 PERIPHERAL VASCULAR DISEASE, UNSPECIFIED: ICD-10-CM

## 2024-06-11 DIAGNOSIS — I25.10 ATHEROSCLEROTIC HEART DISEASE OF NATIVE CORONARY ARTERY WITHOUT ANGINA PECTORIS: ICD-10-CM

## 2024-06-11 DIAGNOSIS — Z86.79 PERSONAL HISTORY OF OTHER DISEASES OF THE CIRCULATORY SYSTEM: ICD-10-CM

## 2024-06-11 DIAGNOSIS — J44.9 CHRONIC OBSTRUCTIVE PULMONARY DISEASE, UNSPECIFIED: ICD-10-CM

## 2024-06-11 DIAGNOSIS — I10 ESSENTIAL (PRIMARY) HYPERTENSION: ICD-10-CM

## 2024-06-11 DIAGNOSIS — M25.551 PAIN IN RIGHT HIP: ICD-10-CM

## 2024-06-11 LAB
ADD ON TEST-SPECIMEN IN LAB: SIGNIFICANT CHANGE UP
APPEARANCE UR: ABNORMAL
B PERT IGG+IGM PNL SER: ABNORMAL
BACTERIA # UR AUTO: ABNORMAL /HPF
BILIRUB UR-MCNC: NEGATIVE — SIGNIFICANT CHANGE UP
CAST: 4 /LPF — SIGNIFICANT CHANGE UP (ref 0–4)
COLOR FLD: ABNORMAL
COLOR SPEC: YELLOW — SIGNIFICANT CHANGE UP
CRYSTALS, BODY FLUID CLARITY: ABNORMAL
CRYSTALS, BODY FLUID COLOR: ABNORMAL
CRYSTALS, BODY FLUID ID: SIGNIFICANT CHANGE UP
CRYSTALS, BODY FLUID TUBE: SIGNIFICANT CHANGE UP
DIFF PNL FLD: NEGATIVE — SIGNIFICANT CHANGE UP
FLUID INTAKE SUBSTANCE CLASS: SIGNIFICANT CHANGE UP
GLUCOSE BLDC GLUCOMTR-MCNC: 86 MG/DL — SIGNIFICANT CHANGE UP (ref 70–99)
GLUCOSE UR QL: NEGATIVE MG/DL — SIGNIFICANT CHANGE UP
KETONES UR-MCNC: NEGATIVE MG/DL — SIGNIFICANT CHANGE UP
LEUKOCYTE ESTERASE UR-ACNC: NEGATIVE — SIGNIFICANT CHANGE UP
LYMPHOCYTES # FLD: 1 % — SIGNIFICANT CHANGE UP
MONOS+MACROS # FLD: 5 % — SIGNIFICANT CHANGE UP
NEUTROPHILS-BODY FLUID: 94 % — SIGNIFICANT CHANGE UP
NITRITE UR-MCNC: NEGATIVE — SIGNIFICANT CHANGE UP
NT-PROBNP SERPL-SCNC: 2575 PG/ML — HIGH
PH UR: 5.5 — SIGNIFICANT CHANGE UP (ref 5–8)
PROT UR-MCNC: 100 MG/DL
RBC CASTS # UR COMP ASSIST: 2 /HPF — SIGNIFICANT CHANGE UP (ref 0–4)
RCV VOL RI: HIGH CELLS/UL (ref 0–5)
SP GR SPEC: 1.03 — SIGNIFICANT CHANGE UP (ref 1–1.03)
SPECIMEN SOURCE FLD: SIGNIFICANT CHANGE UP
SQUAMOUS # UR AUTO: 5 /HPF — SIGNIFICANT CHANGE UP (ref 0–5)
TOTAL CELLS COUNTED, BODY FLUID: 100 CELLS — SIGNIFICANT CHANGE UP
TOTAL NUCLEATED CELL COUNT, BODY FLUID: 3090 CELLS/UL — HIGH (ref 0–5)
TROPONIN T, HIGH SENSITIVITY RESULT: 23 NG/L — SIGNIFICANT CHANGE UP
TUBE TYPE: SIGNIFICANT CHANGE UP
UROBILINOGEN FLD QL: 1 MG/DL — SIGNIFICANT CHANGE UP (ref 0.2–1)
WBC UR QL: 20 /HPF — HIGH (ref 0–5)

## 2024-06-11 PROCEDURE — 73564 X-RAY EXAM KNEE 4 OR MORE: CPT | Mod: 26,RT

## 2024-06-11 PROCEDURE — 74177 CT ABD & PELVIS W/CONTRAST: CPT | Mod: 26,MC

## 2024-06-11 PROCEDURE — 20611 DRAIN/INJ JOINT/BURSA W/US: CPT | Mod: RT

## 2024-06-11 PROCEDURE — 71045 X-RAY EXAM CHEST 1 VIEW: CPT | Mod: 26

## 2024-06-11 PROCEDURE — 99223 1ST HOSP IP/OBS HIGH 75: CPT

## 2024-06-11 PROCEDURE — 73552 X-RAY EXAM OF FEMUR 2/>: CPT | Mod: 26,RT

## 2024-06-11 RX ORDER — MORPHINE SULFATE 100 MG/1
2 TABLET, EXTENDED RELEASE ORAL EVERY 6 HOURS
Refills: 0 | Status: DISCONTINUED | OUTPATIENT
Start: 2024-06-11 | End: 2024-06-13

## 2024-06-11 RX ORDER — VANCOMYCIN HYDROCHLORIDE 50 MG/ML
750 KIT ORAL EVERY 12 HOURS
Refills: 0 | Status: DISCONTINUED | OUTPATIENT
Start: 2024-06-11 | End: 2024-06-13

## 2024-06-11 RX ORDER — BENZONATATE 100 MG/1
100 TABLET ORAL EVERY 8 HOURS
Refills: 0 | Status: DISCONTINUED | OUTPATIENT
Start: 2024-06-11 | End: 2024-06-26

## 2024-06-11 RX ORDER — LORAZEPAM 0.5 MG
1 TABLET ORAL
Refills: 0 | Status: DISCONTINUED | OUTPATIENT
Start: 2024-06-11 | End: 2024-06-14

## 2024-06-11 RX ORDER — ATORVASTATIN CALCIUM 20 MG/1
40 TABLET, FILM COATED ORAL AT BEDTIME
Refills: 0 | Status: DISCONTINUED | OUTPATIENT
Start: 2024-06-11 | End: 2024-06-26

## 2024-06-11 RX ORDER — LIDOCAINE HCL 28 MG/G
1 GEL TOPICAL ONCE
Refills: 0 | Status: COMPLETED | OUTPATIENT
Start: 2024-06-11 | End: 2024-06-11

## 2024-06-11 RX ORDER — CLONIDINE HYDROCHLORIDE 0.3 MG/1
0.1 TABLET ORAL DAILY
Refills: 0 | Status: DISCONTINUED | OUTPATIENT
Start: 2024-06-11 | End: 2024-06-18

## 2024-06-11 RX ORDER — GABAPENTIN
400 POWDER (GRAM) MISCELLANEOUS EVERY 8 HOURS
Refills: 0 | Status: DISCONTINUED | OUTPATIENT
Start: 2024-06-11 | End: 2024-06-26

## 2024-06-11 RX ORDER — VANCOMYCIN HYDROCHLORIDE 50 MG/ML
1000 KIT ORAL ONCE
Refills: 0 | Status: COMPLETED | OUTPATIENT
Start: 2024-06-11 | End: 2024-06-11

## 2024-06-11 RX ORDER — PIPERACILLIN SODIUM AND TAZOBACTAM SODIUM 3; .375 G/15ML; G/15ML
3.38 INJECTION, POWDER, LYOPHILIZED, FOR SOLUTION INTRAVENOUS EVERY 8 HOURS
Refills: 0 | Status: DISCONTINUED | OUTPATIENT
Start: 2024-06-11 | End: 2024-06-14

## 2024-06-11 RX ORDER — ACETAMINOPHEN 325 MG
1000 TABLET ORAL ONCE
Refills: 0 | Status: COMPLETED | OUTPATIENT
Start: 2024-06-11 | End: 2024-06-11

## 2024-06-11 RX ORDER — HYDROMORPHONE HCL 0.2 MG/ML
0.5 INJECTION, SOLUTION INTRAVENOUS ONCE
Refills: 0 | Status: DISCONTINUED | OUTPATIENT
Start: 2024-06-11 | End: 2024-06-11

## 2024-06-11 RX ORDER — OXYCODONE AND ACETAMINOPHEN 5; 325 MG/1; MG/1
2 TABLET ORAL
Refills: 0 | DISCHARGE

## 2024-06-11 RX ORDER — SODIUM CHLORIDE 0.9 % (FLUSH) 0.9 %
1000 SYRINGE (ML) INJECTION ONCE
Refills: 0 | Status: COMPLETED | OUTPATIENT
Start: 2024-06-11 | End: 2024-06-11

## 2024-06-11 RX ORDER — CLONIDINE HYDROCHLORIDE 0.3 MG/1
1 TABLET ORAL
Refills: 0 | DISCHARGE

## 2024-06-11 RX ORDER — ASPIRIN 325 MG/1
81 TABLET, FILM COATED ORAL DAILY
Refills: 0 | Status: DISCONTINUED | OUTPATIENT
Start: 2024-06-12 | End: 2024-06-26

## 2024-06-11 RX ORDER — ALBUTEROL 90 UG/1
2 AEROSOL, METERED ORAL
Refills: 0 | DISCHARGE

## 2024-06-11 RX ORDER — IPRATROPIUM BROMIDE AND ALBUTEROL SULFATE .5; 3 MG/3ML; MG/3ML
3 SOLUTION RESPIRATORY (INHALATION) ONCE
Refills: 0 | Status: COMPLETED | OUTPATIENT
Start: 2024-06-11 | End: 2024-06-11

## 2024-06-11 RX ORDER — ACETAMINOPHEN 325 MG
1000 TABLET ORAL EVERY 8 HOURS
Refills: 0 | Status: COMPLETED | OUTPATIENT
Start: 2024-06-11 | End: 2024-06-11

## 2024-06-11 RX ORDER — PIPERACILLIN SODIUM AND TAZOBACTAM SODIUM 3; .375 G/15ML; G/15ML
3.38 INJECTION, POWDER, LYOPHILIZED, FOR SOLUTION INTRAVENOUS ONCE
Refills: 0 | Status: COMPLETED | OUTPATIENT
Start: 2024-06-11 | End: 2024-06-11

## 2024-06-11 RX ORDER — IPRATROPIUM BROMIDE AND ALBUTEROL SULFATE .5; 3 MG/3ML; MG/3ML
3 SOLUTION RESPIRATORY (INHALATION) EVERY 6 HOURS
Refills: 0 | Status: DISCONTINUED | OUTPATIENT
Start: 2024-06-11 | End: 2024-06-18

## 2024-06-11 RX ORDER — MORPHINE SULFATE 100 MG/1
4 TABLET, EXTENDED RELEASE ORAL ONCE
Refills: 0 | Status: DISCONTINUED | OUTPATIENT
Start: 2024-06-11 | End: 2024-06-11

## 2024-06-11 RX ORDER — GABAPENTIN
1 POWDER (GRAM) MISCELLANEOUS
Refills: 0 | DISCHARGE

## 2024-06-11 RX ORDER — ENOXAPARIN SODIUM 100 MG/ML
40 INJECTION SUBCUTANEOUS EVERY 24 HOURS
Refills: 0 | Status: DISCONTINUED | OUTPATIENT
Start: 2024-06-11 | End: 2024-06-15

## 2024-06-11 RX ADMIN — MORPHINE SULFATE 4 MILLIGRAM(S): 100 TABLET, EXTENDED RELEASE ORAL at 02:01

## 2024-06-11 RX ADMIN — Medication 1000 MILLIGRAM(S): at 01:50

## 2024-06-11 RX ADMIN — IPRATROPIUM BROMIDE AND ALBUTEROL SULFATE 3 MILLILITER(S): .5; 3 SOLUTION RESPIRATORY (INHALATION) at 07:01

## 2024-06-11 RX ADMIN — Medication 400 MILLIGRAM(S): at 18:00

## 2024-06-11 RX ADMIN — Medication 400 MILLIGRAM(S): at 13:29

## 2024-06-11 RX ADMIN — HYDROMORPHONE HCL 0.5 MILLIGRAM(S): 0.2 INJECTION, SOLUTION INTRAVENOUS at 10:30

## 2024-06-11 RX ADMIN — Medication 1000 MILLILITER(S): at 01:56

## 2024-06-11 RX ADMIN — Medication 400 MILLIGRAM(S): at 06:44

## 2024-06-11 RX ADMIN — PIPERACILLIN SODIUM AND TAZOBACTAM SODIUM 25 GRAM(S): 3; .375 INJECTION, POWDER, LYOPHILIZED, FOR SOLUTION INTRAVENOUS at 22:23

## 2024-06-11 RX ADMIN — MORPHINE SULFATE 4 MILLIGRAM(S): 100 TABLET, EXTENDED RELEASE ORAL at 01:50

## 2024-06-11 RX ADMIN — VANCOMYCIN HYDROCHLORIDE 250 MILLIGRAM(S): KIT at 07:06

## 2024-06-11 RX ADMIN — HYDROMORPHONE HCL 0.5 MILLIGRAM(S): 0.2 INJECTION, SOLUTION INTRAVENOUS at 01:50

## 2024-06-11 RX ADMIN — MORPHINE SULFATE 2 MILLIGRAM(S): 100 TABLET, EXTENDED RELEASE ORAL at 12:58

## 2024-06-11 RX ADMIN — PIPERACILLIN SODIUM AND TAZOBACTAM SODIUM 25 GRAM(S): 3; .375 INJECTION, POWDER, LYOPHILIZED, FOR SOLUTION INTRAVENOUS at 13:29

## 2024-06-11 RX ADMIN — HYDROMORPHONE HCL 0.5 MILLIGRAM(S): 0.2 INJECTION, SOLUTION INTRAVENOUS at 11:48

## 2024-06-11 RX ADMIN — VANCOMYCIN HYDROCHLORIDE 250 MILLIGRAM(S): KIT at 21:11

## 2024-06-11 RX ADMIN — PIPERACILLIN SODIUM AND TAZOBACTAM SODIUM 200 GRAM(S): 3; .375 INJECTION, POWDER, LYOPHILIZED, FOR SOLUTION INTRAVENOUS at 06:44

## 2024-06-11 RX ADMIN — HYDROMORPHONE HCL 0.5 MILLIGRAM(S): 0.2 INJECTION, SOLUTION INTRAVENOUS at 00:59

## 2024-06-12 LAB
ALBUMIN SERPL ELPH-MCNC: 2.7 G/DL — LOW (ref 3.3–5)
ALP SERPL-CCNC: 69 U/L — SIGNIFICANT CHANGE UP (ref 40–120)
ALT FLD-CCNC: 11 U/L — SIGNIFICANT CHANGE UP (ref 4–33)
ANION GAP SERPL CALC-SCNC: 10 MMOL/L — SIGNIFICANT CHANGE UP (ref 7–14)
AST SERPL-CCNC: 18 U/L — SIGNIFICANT CHANGE UP (ref 4–32)
BILIRUB SERPL-MCNC: 0.4 MG/DL — SIGNIFICANT CHANGE UP (ref 0.2–1.2)
BUN SERPL-MCNC: 16 MG/DL — SIGNIFICANT CHANGE UP (ref 7–23)
CALCIUM SERPL-MCNC: 8.6 MG/DL — SIGNIFICANT CHANGE UP (ref 8.4–10.5)
CHLORIDE SERPL-SCNC: 105 MMOL/L — SIGNIFICANT CHANGE UP (ref 98–107)
CO2 SERPL-SCNC: 23 MMOL/L — SIGNIFICANT CHANGE UP (ref 22–31)
CREAT SERPL-MCNC: 0.88 MG/DL — SIGNIFICANT CHANGE UP (ref 0.5–1.3)
CRP SERPL-MCNC: 265.1 MG/L — HIGH
CULTURE RESULTS: SIGNIFICANT CHANGE UP
EGFR: 68 ML/MIN/1.73M2 — SIGNIFICANT CHANGE UP
ERYTHROCYTE [SEDIMENTATION RATE] IN BLOOD: 74 MM/HR — HIGH (ref 4–25)
GLUCOSE SERPL-MCNC: 78 MG/DL — SIGNIFICANT CHANGE UP (ref 70–99)
GRAM STN FLD: SIGNIFICANT CHANGE UP
HCT VFR BLD CALC: 37.5 % — SIGNIFICANT CHANGE UP (ref 34.5–45)
HGB BLD-MCNC: 12.3 G/DL — SIGNIFICANT CHANGE UP (ref 11.5–15.5)
MAGNESIUM SERPL-MCNC: 1.9 MG/DL — SIGNIFICANT CHANGE UP (ref 1.6–2.6)
MCHC RBC-ENTMCNC: 32.8 GM/DL — SIGNIFICANT CHANGE UP (ref 32–36)
MCHC RBC-ENTMCNC: 33.6 PG — SIGNIFICANT CHANGE UP (ref 27–34)
MCV RBC AUTO: 102.5 FL — HIGH (ref 80–100)
NRBC # BLD: 0 /100 WBCS — SIGNIFICANT CHANGE UP (ref 0–0)
NRBC # FLD: 0 K/UL — SIGNIFICANT CHANGE UP (ref 0–0)
PHOSPHATE SERPL-MCNC: 3.7 MG/DL — SIGNIFICANT CHANGE UP (ref 2.5–4.5)
PLATELET # BLD AUTO: 144 K/UL — LOW (ref 150–400)
POTASSIUM SERPL-MCNC: 3.8 MMOL/L — SIGNIFICANT CHANGE UP (ref 3.5–5.3)
POTASSIUM SERPL-SCNC: 3.8 MMOL/L — SIGNIFICANT CHANGE UP (ref 3.5–5.3)
PROT SERPL-MCNC: 6 G/DL — SIGNIFICANT CHANGE UP (ref 6–8.3)
RBC # BLD: 3.66 M/UL — LOW (ref 3.8–5.2)
RBC # FLD: 13.9 % — SIGNIFICANT CHANGE UP (ref 10.3–14.5)
SODIUM SERPL-SCNC: 138 MMOL/L — SIGNIFICANT CHANGE UP (ref 135–145)
SPECIMEN SOURCE: SIGNIFICANT CHANGE UP
SPECIMEN SOURCE: SIGNIFICANT CHANGE UP
WBC # BLD: 6.08 K/UL — SIGNIFICANT CHANGE UP (ref 3.8–10.5)
WBC # FLD AUTO: 6.08 K/UL — SIGNIFICANT CHANGE UP (ref 3.8–10.5)

## 2024-06-12 PROCEDURE — 99232 SBSQ HOSP IP/OBS MODERATE 35: CPT

## 2024-06-12 RX ORDER — NICOTINE POLACRILEX 2 MG/1
1 LOZENGE ORAL DAILY
Refills: 0 | Status: DISCONTINUED | OUTPATIENT
Start: 2024-06-12 | End: 2024-06-19

## 2024-06-12 RX ORDER — MORPHINE SULFATE 100 MG/1
1 TABLET, EXTENDED RELEASE ORAL ONCE
Refills: 0 | Status: DISCONTINUED | OUTPATIENT
Start: 2024-06-12 | End: 2024-06-12

## 2024-06-12 RX ORDER — ACETAMINOPHEN 325 MG
1000 TABLET ORAL ONCE
Refills: 0 | Status: COMPLETED | OUTPATIENT
Start: 2024-06-12 | End: 2024-06-12

## 2024-06-12 RX ADMIN — PIPERACILLIN SODIUM AND TAZOBACTAM SODIUM 25 GRAM(S): 3; .375 INJECTION, POWDER, LYOPHILIZED, FOR SOLUTION INTRAVENOUS at 21:31

## 2024-06-12 RX ADMIN — PIPERACILLIN SODIUM AND TAZOBACTAM SODIUM 25 GRAM(S): 3; .375 INJECTION, POWDER, LYOPHILIZED, FOR SOLUTION INTRAVENOUS at 13:17

## 2024-06-12 RX ADMIN — Medication 1000 MILLIGRAM(S): at 04:19

## 2024-06-12 RX ADMIN — Medication 1 APPLICATION(S): at 17:35

## 2024-06-12 RX ADMIN — MORPHINE SULFATE 2 MILLIGRAM(S): 100 TABLET, EXTENDED RELEASE ORAL at 16:12

## 2024-06-12 RX ADMIN — MORPHINE SULFATE 2 MILLIGRAM(S): 100 TABLET, EXTENDED RELEASE ORAL at 01:16

## 2024-06-12 RX ADMIN — Medication 400 MILLIGRAM(S): at 21:32

## 2024-06-12 RX ADMIN — MORPHINE SULFATE 2 MILLIGRAM(S): 100 TABLET, EXTENDED RELEASE ORAL at 15:12

## 2024-06-12 RX ADMIN — Medication 1 MILLIGRAM(S): at 21:33

## 2024-06-12 RX ADMIN — MORPHINE SULFATE 1 MILLIGRAM(S): 100 TABLET, EXTENDED RELEASE ORAL at 10:09

## 2024-06-12 RX ADMIN — VANCOMYCIN HYDROCHLORIDE 250 MILLIGRAM(S): KIT at 17:35

## 2024-06-12 RX ADMIN — MORPHINE SULFATE 1 MILLIGRAM(S): 100 TABLET, EXTENDED RELEASE ORAL at 11:09

## 2024-06-12 RX ADMIN — Medication 400 MILLIGRAM(S): at 03:19

## 2024-06-12 RX ADMIN — ATORVASTATIN CALCIUM 40 MILLIGRAM(S): 20 TABLET, FILM COATED ORAL at 21:33

## 2024-06-12 RX ADMIN — PIPERACILLIN SODIUM AND TAZOBACTAM SODIUM 25 GRAM(S): 3; .375 INJECTION, POWDER, LYOPHILIZED, FOR SOLUTION INTRAVENOUS at 06:53

## 2024-06-12 RX ADMIN — VANCOMYCIN HYDROCHLORIDE 250 MILLIGRAM(S): KIT at 09:07

## 2024-06-12 RX ADMIN — MORPHINE SULFATE 2 MILLIGRAM(S): 100 TABLET, EXTENDED RELEASE ORAL at 07:27

## 2024-06-12 RX ADMIN — ENOXAPARIN SODIUM 40 MILLIGRAM(S): 100 INJECTION SUBCUTANEOUS at 09:12

## 2024-06-12 RX ADMIN — Medication 1 MILLIGRAM(S): at 12:52

## 2024-06-12 RX ADMIN — Medication 6 MILLIGRAM(S): at 21:31

## 2024-06-12 RX ADMIN — MORPHINE SULFATE 2 MILLIGRAM(S): 100 TABLET, EXTENDED RELEASE ORAL at 08:27

## 2024-06-13 LAB
ANION GAP SERPL CALC-SCNC: 11 MMOL/L — SIGNIFICANT CHANGE UP (ref 7–14)
BASOPHILS # BLD AUTO: 0.01 K/UL — SIGNIFICANT CHANGE UP (ref 0–0.2)
BASOPHILS NFR BLD AUTO: 0.1 % — SIGNIFICANT CHANGE UP (ref 0–2)
BUN SERPL-MCNC: 14 MG/DL — SIGNIFICANT CHANGE UP (ref 7–23)
CALCIUM SERPL-MCNC: 8.7 MG/DL — SIGNIFICANT CHANGE UP (ref 8.4–10.5)
CHLORIDE SERPL-SCNC: 102 MMOL/L — SIGNIFICANT CHANGE UP (ref 98–107)
CO2 SERPL-SCNC: 25 MMOL/L — SIGNIFICANT CHANGE UP (ref 22–31)
CREAT SERPL-MCNC: 0.69 MG/DL — SIGNIFICANT CHANGE UP (ref 0.5–1.3)
EGFR: 90 ML/MIN/1.73M2 — SIGNIFICANT CHANGE UP
EOSINOPHIL # BLD AUTO: 0.01 K/UL — SIGNIFICANT CHANGE UP (ref 0–0.5)
EOSINOPHIL NFR BLD AUTO: 0.1 % — SIGNIFICANT CHANGE UP (ref 0–6)
GLUCOSE SERPL-MCNC: 97 MG/DL — SIGNIFICANT CHANGE UP (ref 70–99)
GRAM STN FLD: ABNORMAL
HCT VFR BLD CALC: 35.4 % — SIGNIFICANT CHANGE UP (ref 34.5–45)
HGB BLD-MCNC: 11.5 G/DL — SIGNIFICANT CHANGE UP (ref 11.5–15.5)
IANC: 5.9 K/UL — SIGNIFICANT CHANGE UP (ref 1.8–7.4)
IMM GRANULOCYTES NFR BLD AUTO: 0.3 % — SIGNIFICANT CHANGE UP (ref 0–0.9)
LYMPHOCYTES # BLD AUTO: 0.26 K/UL — LOW (ref 1–3.3)
LYMPHOCYTES # BLD AUTO: 3.7 % — LOW (ref 13–44)
MAGNESIUM SERPL-MCNC: 1.9 MG/DL — SIGNIFICANT CHANGE UP (ref 1.6–2.6)
MCHC RBC-ENTMCNC: 32.5 GM/DL — SIGNIFICANT CHANGE UP (ref 32–36)
MCHC RBC-ENTMCNC: 33.6 PG — SIGNIFICANT CHANGE UP (ref 27–34)
MCV RBC AUTO: 103.5 FL — HIGH (ref 80–100)
MONOCYTES # BLD AUTO: 0.79 K/UL — SIGNIFICANT CHANGE UP (ref 0–0.9)
MONOCYTES NFR BLD AUTO: 11.3 % — SIGNIFICANT CHANGE UP (ref 2–14)
MRSA PCR RESULT.: SIGNIFICANT CHANGE UP
NEUTROPHILS # BLD AUTO: 5.9 K/UL — SIGNIFICANT CHANGE UP (ref 1.8–7.4)
NEUTROPHILS NFR BLD AUTO: 84.5 % — HIGH (ref 43–77)
NRBC # BLD: 0 /100 WBCS — SIGNIFICANT CHANGE UP (ref 0–0)
NRBC # FLD: 0 K/UL — SIGNIFICANT CHANGE UP (ref 0–0)
PHOSPHATE SERPL-MCNC: 3.6 MG/DL — SIGNIFICANT CHANGE UP (ref 2.5–4.5)
PLATELET # BLD AUTO: 168 K/UL — SIGNIFICANT CHANGE UP (ref 150–400)
POTASSIUM SERPL-MCNC: 4.4 MMOL/L — SIGNIFICANT CHANGE UP (ref 3.5–5.3)
POTASSIUM SERPL-SCNC: 4.4 MMOL/L — SIGNIFICANT CHANGE UP (ref 3.5–5.3)
RBC # BLD: 3.42 M/UL — LOW (ref 3.8–5.2)
RBC # FLD: 13.2 % — SIGNIFICANT CHANGE UP (ref 10.3–14.5)
S AUREUS DNA NOSE QL NAA+PROBE: SIGNIFICANT CHANGE UP
SODIUM SERPL-SCNC: 138 MMOL/L — SIGNIFICANT CHANGE UP (ref 135–145)
VANCOMYCIN TROUGH SERPL-MCNC: 7.1 UG/ML — LOW (ref 10–20)
WBC # BLD: 6.99 K/UL — SIGNIFICANT CHANGE UP (ref 3.8–10.5)
WBC # FLD AUTO: 6.99 K/UL — SIGNIFICANT CHANGE UP (ref 3.8–10.5)

## 2024-06-13 PROCEDURE — 99222 1ST HOSP IP/OBS MODERATE 55: CPT

## 2024-06-13 PROCEDURE — 99232 SBSQ HOSP IP/OBS MODERATE 35: CPT

## 2024-06-13 PROCEDURE — 99232 SBSQ HOSP IP/OBS MODERATE 35: CPT | Mod: GC

## 2024-06-13 RX ORDER — ACETAMINOPHEN 325 MG
1000 TABLET ORAL ONCE
Refills: 0 | Status: COMPLETED | OUTPATIENT
Start: 2024-06-13 | End: 2024-06-13

## 2024-06-13 RX ORDER — KETOROLAC TROMETHAMINE 30 MG/ML
30 INJECTION, SOLUTION INTRAMUSCULAR ONCE
Refills: 0 | Status: DISCONTINUED | OUTPATIENT
Start: 2024-06-13 | End: 2024-06-13

## 2024-06-13 RX ORDER — ACETAMINOPHEN 325 MG
650 TABLET ORAL EVERY 8 HOURS
Refills: 0 | Status: COMPLETED | OUTPATIENT
Start: 2024-06-13 | End: 2024-06-16

## 2024-06-13 RX ORDER — OXYCODONE HYDROCHLORIDE 100 MG/5ML
5 SOLUTION ORAL EVERY 4 HOURS
Refills: 0 | Status: DISCONTINUED | OUTPATIENT
Start: 2024-06-13 | End: 2024-06-13

## 2024-06-13 RX ORDER — VANCOMYCIN HYDROCHLORIDE 50 MG/ML
1000 KIT ORAL EVERY 12 HOURS
Refills: 0 | Status: DISCONTINUED | OUTPATIENT
Start: 2024-06-13 | End: 2024-06-18

## 2024-06-13 RX ORDER — OXYCODONE HYDROCHLORIDE 100 MG/5ML
10 SOLUTION ORAL EVERY 4 HOURS
Refills: 0 | Status: DISCONTINUED | OUTPATIENT
Start: 2024-06-13 | End: 2024-06-18

## 2024-06-13 RX ORDER — OXYCODONE HYDROCHLORIDE 100 MG/5ML
5 SOLUTION ORAL EVERY 4 HOURS
Refills: 0 | Status: DISCONTINUED | OUTPATIENT
Start: 2024-06-13 | End: 2024-06-18

## 2024-06-13 RX ORDER — ALBUTEROL 90 MCG
2 AEROSOL REFILL (GRAM) INHALATION EVERY 6 HOURS
Refills: 0 | Status: DISCONTINUED | OUTPATIENT
Start: 2024-06-13 | End: 2024-06-18

## 2024-06-13 RX ORDER — OXYCODONE HYDROCHLORIDE 100 MG/5ML
10 SOLUTION ORAL EVERY 4 HOURS
Refills: 0 | Status: DISCONTINUED | OUTPATIENT
Start: 2024-06-13 | End: 2024-06-13

## 2024-06-13 RX ORDER — MORPHINE SULFATE 100 MG/1
1 TABLET, EXTENDED RELEASE ORAL ONCE
Refills: 0 | Status: DISCONTINUED | OUTPATIENT
Start: 2024-06-13 | End: 2024-06-13

## 2024-06-13 RX ADMIN — Medication 400 MILLIGRAM(S): at 06:16

## 2024-06-13 RX ADMIN — OXYCODONE HYDROCHLORIDE 5 MILLIGRAM(S): 100 SOLUTION ORAL at 23:41

## 2024-06-13 RX ADMIN — MORPHINE SULFATE 1 MILLIGRAM(S): 100 TABLET, EXTENDED RELEASE ORAL at 20:42

## 2024-06-13 RX ADMIN — OXYCODONE HYDROCHLORIDE 10 MILLIGRAM(S): 100 SOLUTION ORAL at 23:45

## 2024-06-13 RX ADMIN — ATORVASTATIN CALCIUM 40 MILLIGRAM(S): 20 TABLET, FILM COATED ORAL at 22:48

## 2024-06-13 RX ADMIN — MORPHINE SULFATE 1 MILLIGRAM(S): 100 TABLET, EXTENDED RELEASE ORAL at 21:20

## 2024-06-13 RX ADMIN — CLONIDINE HYDROCHLORIDE 0.1 MILLIGRAM(S): 0.3 TABLET ORAL at 06:16

## 2024-06-13 RX ADMIN — PIPERACILLIN SODIUM AND TAZOBACTAM SODIUM 25 GRAM(S): 3; .375 INJECTION, POWDER, LYOPHILIZED, FOR SOLUTION INTRAVENOUS at 06:15

## 2024-06-13 RX ADMIN — OXYCODONE HYDROCHLORIDE 5 MILLIGRAM(S): 100 SOLUTION ORAL at 15:19

## 2024-06-13 RX ADMIN — MORPHINE SULFATE 2 MILLIGRAM(S): 100 TABLET, EXTENDED RELEASE ORAL at 12:04

## 2024-06-13 RX ADMIN — VANCOMYCIN HYDROCHLORIDE 250 MILLIGRAM(S): KIT at 22:48

## 2024-06-13 RX ADMIN — MORPHINE SULFATE 2 MILLIGRAM(S): 100 TABLET, EXTENDED RELEASE ORAL at 11:04

## 2024-06-13 RX ADMIN — PIPERACILLIN SODIUM AND TAZOBACTAM SODIUM 25 GRAM(S): 3; .375 INJECTION, POWDER, LYOPHILIZED, FOR SOLUTION INTRAVENOUS at 14:08

## 2024-06-13 RX ADMIN — Medication 1 APPLICATION(S): at 11:11

## 2024-06-13 RX ADMIN — Medication 650 MILLIGRAM(S): at 22:48

## 2024-06-13 RX ADMIN — ENOXAPARIN SODIUM 40 MILLIGRAM(S): 100 INJECTION SUBCUTANEOUS at 11:02

## 2024-06-13 RX ADMIN — Medication 400 MILLIGRAM(S): at 22:48

## 2024-06-13 RX ADMIN — ASPIRIN 81 MILLIGRAM(S): 325 TABLET, FILM COATED ORAL at 11:02

## 2024-06-13 RX ADMIN — PIPERACILLIN SODIUM AND TAZOBACTAM SODIUM 25 GRAM(S): 3; .375 INJECTION, POWDER, LYOPHILIZED, FOR SOLUTION INTRAVENOUS at 22:49

## 2024-06-13 RX ADMIN — VANCOMYCIN HYDROCHLORIDE 250 MILLIGRAM(S): KIT at 11:23

## 2024-06-13 RX ADMIN — Medication 400 MILLIGRAM(S): at 17:29

## 2024-06-13 RX ADMIN — OXYCODONE HYDROCHLORIDE 5 MILLIGRAM(S): 100 SOLUTION ORAL at 22:51

## 2024-06-13 RX ADMIN — OXYCODONE HYDROCHLORIDE 5 MILLIGRAM(S): 100 SOLUTION ORAL at 14:19

## 2024-06-14 LAB
-  AMOXICILLIN/CLAVULANIC ACID: SIGNIFICANT CHANGE UP
-  AMPICILLIN: SIGNIFICANT CHANGE UP
-  CEFTRIAXONE: SIGNIFICANT CHANGE UP
-  CIPROFLOXACIN: SIGNIFICANT CHANGE UP
-  CLINDAMYCIN: SIGNIFICANT CHANGE UP
-  DAPTOMYCIN: SIGNIFICANT CHANGE UP
-  ERYTHROMYCIN: SIGNIFICANT CHANGE UP
-  GENTAMICIN: SIGNIFICANT CHANGE UP
-  LEVOFLOXACIN: SIGNIFICANT CHANGE UP
-  LINEZOLID: SIGNIFICANT CHANGE UP
-  MEROPENEM: SIGNIFICANT CHANGE UP
-  MOXIFLOXACIN: SIGNIFICANT CHANGE UP
-  OXACILLIN: SIGNIFICANT CHANGE UP
-  PENICILLIN: SIGNIFICANT CHANGE UP
-  RIFAMPIN: SIGNIFICANT CHANGE UP
-  TETRACYCLINE: SIGNIFICANT CHANGE UP
-  TRIMETHOPRIM/SULFAMETHOXAZOLE: SIGNIFICANT CHANGE UP
-  VANCOMYCIN: SIGNIFICANT CHANGE UP
ALBUMIN SERPL ELPH-MCNC: 2.8 G/DL — LOW (ref 3.3–5)
ALP SERPL-CCNC: 137 U/L — HIGH (ref 40–120)
ALT FLD-CCNC: 18 U/L — SIGNIFICANT CHANGE UP (ref 4–33)
ANION GAP SERPL CALC-SCNC: 6 MMOL/L — LOW (ref 7–14)
AST SERPL-CCNC: 25 U/L — SIGNIFICANT CHANGE UP (ref 4–32)
BASOPHILS # BLD AUTO: 0.02 K/UL — SIGNIFICANT CHANGE UP (ref 0–0.2)
BASOPHILS NFR BLD AUTO: 0.3 % — SIGNIFICANT CHANGE UP (ref 0–2)
BILIRUB SERPL-MCNC: 0.4 MG/DL — SIGNIFICANT CHANGE UP (ref 0.2–1.2)
BUN SERPL-MCNC: 12 MG/DL — SIGNIFICANT CHANGE UP (ref 7–23)
CALCIUM SERPL-MCNC: 8.7 MG/DL — SIGNIFICANT CHANGE UP (ref 8.4–10.5)
CHLORIDE SERPL-SCNC: 98 MMOL/L — SIGNIFICANT CHANGE UP (ref 98–107)
CO2 SERPL-SCNC: 30 MMOL/L — SIGNIFICANT CHANGE UP (ref 22–31)
CREAT SERPL-MCNC: 0.74 MG/DL — SIGNIFICANT CHANGE UP (ref 0.5–1.3)
EGFR: 84 ML/MIN/1.73M2 — SIGNIFICANT CHANGE UP
EOSINOPHIL # BLD AUTO: 0.1 K/UL — SIGNIFICANT CHANGE UP (ref 0–0.5)
EOSINOPHIL NFR BLD AUTO: 1.7 % — SIGNIFICANT CHANGE UP (ref 0–6)
GLUCOSE BLDC GLUCOMTR-MCNC: 106 MG/DL — HIGH (ref 70–99)
GLUCOSE SERPL-MCNC: 96 MG/DL — SIGNIFICANT CHANGE UP (ref 70–99)
HCT VFR BLD CALC: 35.8 % — SIGNIFICANT CHANGE UP (ref 34.5–45)
HGB BLD-MCNC: 11.4 G/DL — LOW (ref 11.5–15.5)
IANC: 4.49 K/UL — SIGNIFICANT CHANGE UP (ref 1.8–7.4)
IMM GRANULOCYTES NFR BLD AUTO: 0.3 % — SIGNIFICANT CHANGE UP (ref 0–0.9)
LYMPHOCYTES # BLD AUTO: 0.29 K/UL — LOW (ref 1–3.3)
LYMPHOCYTES # BLD AUTO: 5 % — LOW (ref 13–44)
MAGNESIUM SERPL-MCNC: 1.9 MG/DL — SIGNIFICANT CHANGE UP (ref 1.6–2.6)
MCHC RBC-ENTMCNC: 31.8 GM/DL — LOW (ref 32–36)
MCHC RBC-ENTMCNC: 32.9 PG — SIGNIFICANT CHANGE UP (ref 27–34)
MCV RBC AUTO: 103.2 FL — HIGH (ref 80–100)
METHOD TYPE: SIGNIFICANT CHANGE UP
MONOCYTES # BLD AUTO: 0.83 K/UL — SIGNIFICANT CHANGE UP (ref 0–0.9)
MONOCYTES NFR BLD AUTO: 14.4 % — HIGH (ref 2–14)
NEUTROPHILS # BLD AUTO: 4.49 K/UL — SIGNIFICANT CHANGE UP (ref 1.8–7.4)
NEUTROPHILS NFR BLD AUTO: 78.3 % — HIGH (ref 43–77)
NRBC # BLD: 0 /100 WBCS — SIGNIFICANT CHANGE UP (ref 0–0)
NRBC # FLD: 0 K/UL — SIGNIFICANT CHANGE UP (ref 0–0)
PHOSPHATE SERPL-MCNC: 2.6 MG/DL — SIGNIFICANT CHANGE UP (ref 2.5–4.5)
PLATELET # BLD AUTO: 175 K/UL — SIGNIFICANT CHANGE UP (ref 150–400)
POTASSIUM SERPL-MCNC: 3.9 MMOL/L — SIGNIFICANT CHANGE UP (ref 3.5–5.3)
POTASSIUM SERPL-SCNC: 3.9 MMOL/L — SIGNIFICANT CHANGE UP (ref 3.5–5.3)
PROT SERPL-MCNC: 6 G/DL — SIGNIFICANT CHANGE UP (ref 6–8.3)
RBC # BLD: 3.47 M/UL — LOW (ref 3.8–5.2)
RBC # FLD: 12.6 % — SIGNIFICANT CHANGE UP (ref 10.3–14.5)
SODIUM SERPL-SCNC: 134 MMOL/L — LOW (ref 135–145)
VANCOMYCIN TROUGH SERPL-MCNC: 14 UG/ML — SIGNIFICANT CHANGE UP (ref 10–20)
WBC # BLD: 5.75 K/UL — SIGNIFICANT CHANGE UP (ref 3.8–10.5)
WBC # FLD AUTO: 5.75 K/UL — SIGNIFICANT CHANGE UP (ref 3.8–10.5)

## 2024-06-14 PROCEDURE — 99232 SBSQ HOSP IP/OBS MODERATE 35: CPT

## 2024-06-14 PROCEDURE — 99232 SBSQ HOSP IP/OBS MODERATE 35: CPT | Mod: GC

## 2024-06-14 RX ADMIN — Medication 650 MILLIGRAM(S): at 06:22

## 2024-06-14 RX ADMIN — ASPIRIN 81 MILLIGRAM(S): 325 TABLET, FILM COATED ORAL at 12:41

## 2024-06-14 RX ADMIN — Medication 400 MILLIGRAM(S): at 06:23

## 2024-06-14 RX ADMIN — VANCOMYCIN HYDROCHLORIDE 250 MILLIGRAM(S): KIT at 11:45

## 2024-06-14 RX ADMIN — OXYCODONE HYDROCHLORIDE 10 MILLIGRAM(S): 100 SOLUTION ORAL at 17:53

## 2024-06-14 RX ADMIN — OXYCODONE HYDROCHLORIDE 10 MILLIGRAM(S): 100 SOLUTION ORAL at 07:27

## 2024-06-14 RX ADMIN — Medication 1 APPLICATION(S): at 12:53

## 2024-06-14 RX ADMIN — OXYCODONE HYDROCHLORIDE 10 MILLIGRAM(S): 100 SOLUTION ORAL at 23:30

## 2024-06-14 RX ADMIN — Medication 400 MILLIGRAM(S): at 13:35

## 2024-06-14 RX ADMIN — ENOXAPARIN SODIUM 40 MILLIGRAM(S): 100 INJECTION SUBCUTANEOUS at 11:01

## 2024-06-14 RX ADMIN — OXYCODONE HYDROCHLORIDE 10 MILLIGRAM(S): 100 SOLUTION ORAL at 00:40

## 2024-06-14 RX ADMIN — Medication 650 MILLIGRAM(S): at 14:00

## 2024-06-14 RX ADMIN — Medication 650 MILLIGRAM(S): at 13:34

## 2024-06-14 RX ADMIN — PIPERACILLIN SODIUM AND TAZOBACTAM SODIUM 25 GRAM(S): 3; .375 INJECTION, POWDER, LYOPHILIZED, FOR SOLUTION INTRAVENOUS at 06:23

## 2024-06-14 RX ADMIN — OXYCODONE HYDROCHLORIDE 10 MILLIGRAM(S): 100 SOLUTION ORAL at 06:27

## 2024-06-14 RX ADMIN — VANCOMYCIN HYDROCHLORIDE 250 MILLIGRAM(S): KIT at 11:01

## 2024-06-14 RX ADMIN — Medication 650 MILLIGRAM(S): at 08:41

## 2024-06-14 RX ADMIN — CLONIDINE HYDROCHLORIDE 0.1 MILLIGRAM(S): 0.3 TABLET ORAL at 06:23

## 2024-06-14 RX ADMIN — OXYCODONE HYDROCHLORIDE 10 MILLIGRAM(S): 100 SOLUTION ORAL at 17:17

## 2024-06-15 LAB
ALBUMIN SERPL ELPH-MCNC: 2.4 G/DL — LOW (ref 3.3–5)
ALP SERPL-CCNC: 101 U/L — SIGNIFICANT CHANGE UP (ref 40–120)
ALT FLD-CCNC: 14 U/L — SIGNIFICANT CHANGE UP (ref 4–33)
ANION GAP SERPL CALC-SCNC: 9 MMOL/L — SIGNIFICANT CHANGE UP (ref 7–14)
AST SERPL-CCNC: 19 U/L — SIGNIFICANT CHANGE UP (ref 4–32)
BILIRUB SERPL-MCNC: 0.3 MG/DL — SIGNIFICANT CHANGE UP (ref 0.2–1.2)
BUN SERPL-MCNC: 12 MG/DL — SIGNIFICANT CHANGE UP (ref 7–23)
CALCIUM SERPL-MCNC: 8.4 MG/DL — SIGNIFICANT CHANGE UP (ref 8.4–10.5)
CHLORIDE SERPL-SCNC: 100 MMOL/L — SIGNIFICANT CHANGE UP (ref 98–107)
CO2 SERPL-SCNC: 25 MMOL/L — SIGNIFICANT CHANGE UP (ref 22–31)
CREAT SERPL-MCNC: 0.59 MG/DL — SIGNIFICANT CHANGE UP (ref 0.5–1.3)
EGFR: 93 ML/MIN/1.73M2 — SIGNIFICANT CHANGE UP
GLUCOSE SERPL-MCNC: 92 MG/DL — SIGNIFICANT CHANGE UP (ref 70–99)
HCT VFR BLD CALC: 34.6 % — SIGNIFICANT CHANGE UP (ref 34.5–45)
HGB BLD-MCNC: 11.1 G/DL — LOW (ref 11.5–15.5)
MAGNESIUM SERPL-MCNC: 1.8 MG/DL — SIGNIFICANT CHANGE UP (ref 1.6–2.6)
MCHC RBC-ENTMCNC: 32.1 GM/DL — SIGNIFICANT CHANGE UP (ref 32–36)
MCHC RBC-ENTMCNC: 32.6 PG — SIGNIFICANT CHANGE UP (ref 27–34)
MCV RBC AUTO: 101.5 FL — HIGH (ref 80–100)
NRBC # BLD: 0 /100 WBCS — SIGNIFICANT CHANGE UP (ref 0–0)
NRBC # FLD: 0 K/UL — SIGNIFICANT CHANGE UP (ref 0–0)
PHOSPHATE SERPL-MCNC: 2.8 MG/DL — SIGNIFICANT CHANGE UP (ref 2.5–4.5)
PLATELET # BLD AUTO: 156 K/UL — SIGNIFICANT CHANGE UP (ref 150–400)
POTASSIUM SERPL-MCNC: 4.2 MMOL/L — SIGNIFICANT CHANGE UP (ref 3.5–5.3)
POTASSIUM SERPL-SCNC: 4.2 MMOL/L — SIGNIFICANT CHANGE UP (ref 3.5–5.3)
PROT SERPL-MCNC: 5.9 G/DL — LOW (ref 6–8.3)
RBC # BLD: 3.41 M/UL — LOW (ref 3.8–5.2)
RBC # FLD: 12.6 % — SIGNIFICANT CHANGE UP (ref 10.3–14.5)
SODIUM SERPL-SCNC: 134 MMOL/L — LOW (ref 135–145)
VANCOMYCIN TROUGH SERPL-MCNC: 17.5 UG/ML — SIGNIFICANT CHANGE UP (ref 10–20)
WBC # BLD: 3.98 K/UL — SIGNIFICANT CHANGE UP (ref 3.8–10.5)
WBC # FLD AUTO: 3.98 K/UL — SIGNIFICANT CHANGE UP (ref 3.8–10.5)

## 2024-06-15 PROCEDURE — 99232 SBSQ HOSP IP/OBS MODERATE 35: CPT

## 2024-06-15 RX ORDER — TIOTROPIUM BROMIDE 18 UG/1
2 CAPSULE ORAL; RESPIRATORY (INHALATION) DAILY
Refills: 0 | Status: DISCONTINUED | OUTPATIENT
Start: 2024-06-15 | End: 2024-06-26

## 2024-06-15 RX ORDER — ALBUTEROL 90 MCG
2 AEROSOL REFILL (GRAM) INHALATION EVERY 6 HOURS
Refills: 0 | Status: DISCONTINUED | OUTPATIENT
Start: 2024-06-15 | End: 2024-06-18

## 2024-06-15 RX ORDER — RIVAROXABAN 10 MG/1
2.5 TABLET, FILM COATED ORAL
Refills: 0 | Status: DISCONTINUED | OUTPATIENT
Start: 2024-06-15 | End: 2024-06-22

## 2024-06-15 RX ORDER — BUDESONIDE/FORMOTEROL FUMARATE 160-4.5MCG
2 HFA AEROSOL WITH ADAPTER (GRAM) INHALATION
Refills: 0 | Status: DISCONTINUED | OUTPATIENT
Start: 2024-06-15 | End: 2024-06-26

## 2024-06-15 RX ADMIN — Medication 650 MILLIGRAM(S): at 13:29

## 2024-06-15 RX ADMIN — Medication 1 APPLICATION(S): at 13:41

## 2024-06-15 RX ADMIN — OXYCODONE HYDROCHLORIDE 10 MILLIGRAM(S): 100 SOLUTION ORAL at 14:29

## 2024-06-15 RX ADMIN — RIVAROXABAN 2.5 MILLIGRAM(S): 10 TABLET, FILM COATED ORAL at 18:51

## 2024-06-15 RX ADMIN — OXYCODONE HYDROCHLORIDE 10 MILLIGRAM(S): 100 SOLUTION ORAL at 18:51

## 2024-06-15 RX ADMIN — TIOTROPIUM BROMIDE 2 PUFF(S): 18 CAPSULE ORAL; RESPIRATORY (INHALATION) at 13:31

## 2024-06-15 RX ADMIN — Medication 400 MILLIGRAM(S): at 13:29

## 2024-06-15 RX ADMIN — OXYCODONE HYDROCHLORIDE 10 MILLIGRAM(S): 100 SOLUTION ORAL at 00:27

## 2024-06-15 RX ADMIN — OXYCODONE HYDROCHLORIDE 10 MILLIGRAM(S): 100 SOLUTION ORAL at 06:16

## 2024-06-15 RX ADMIN — OXYCODONE HYDROCHLORIDE 10 MILLIGRAM(S): 100 SOLUTION ORAL at 07:16

## 2024-06-15 RX ADMIN — Medication 1 TABLET(S): at 09:24

## 2024-06-15 RX ADMIN — NICOTINE POLACRILEX 1 PATCH: 2 LOZENGE ORAL at 09:22

## 2024-06-15 RX ADMIN — VANCOMYCIN HYDROCHLORIDE 250 MILLIGRAM(S): KIT at 09:22

## 2024-06-15 RX ADMIN — ASPIRIN 81 MILLIGRAM(S): 325 TABLET, FILM COATED ORAL at 09:24

## 2024-06-15 RX ADMIN — OXYCODONE HYDROCHLORIDE 10 MILLIGRAM(S): 100 SOLUTION ORAL at 00:30

## 2024-06-15 RX ADMIN — OXYCODONE HYDROCHLORIDE 10 MILLIGRAM(S): 100 SOLUTION ORAL at 23:27

## 2024-06-15 RX ADMIN — OXYCODONE HYDROCHLORIDE 10 MILLIGRAM(S): 100 SOLUTION ORAL at 13:29

## 2024-06-16 LAB
ALBUMIN SERPL ELPH-MCNC: 2.4 G/DL — LOW (ref 3.3–5)
ALP SERPL-CCNC: 87 U/L — SIGNIFICANT CHANGE UP (ref 40–120)
ALT FLD-CCNC: 13 U/L — SIGNIFICANT CHANGE UP (ref 4–33)
ANION GAP SERPL CALC-SCNC: 7 MMOL/L — SIGNIFICANT CHANGE UP (ref 7–14)
AST SERPL-CCNC: 13 U/L — SIGNIFICANT CHANGE UP (ref 4–32)
BILIRUB SERPL-MCNC: 0.2 MG/DL — SIGNIFICANT CHANGE UP (ref 0.2–1.2)
BUN SERPL-MCNC: 16 MG/DL — SIGNIFICANT CHANGE UP (ref 7–23)
CALCIUM SERPL-MCNC: 8.3 MG/DL — LOW (ref 8.4–10.5)
CHLORIDE SERPL-SCNC: 103 MMOL/L — SIGNIFICANT CHANGE UP (ref 98–107)
CO2 SERPL-SCNC: 29 MMOL/L — SIGNIFICANT CHANGE UP (ref 22–31)
CREAT SERPL-MCNC: 0.7 MG/DL — SIGNIFICANT CHANGE UP (ref 0.5–1.3)
CULTURE RESULTS: SIGNIFICANT CHANGE UP
CULTURE RESULTS: SIGNIFICANT CHANGE UP
EGFR: 90 ML/MIN/1.73M2 — SIGNIFICANT CHANGE UP
GLUCOSE SERPL-MCNC: 80 MG/DL — SIGNIFICANT CHANGE UP (ref 70–99)
HCT VFR BLD CALC: 35.1 % — SIGNIFICANT CHANGE UP (ref 34.5–45)
HGB BLD-MCNC: 11.2 G/DL — LOW (ref 11.5–15.5)
MAGNESIUM SERPL-MCNC: 2 MG/DL — SIGNIFICANT CHANGE UP (ref 1.6–2.6)
MCHC RBC-ENTMCNC: 31.9 GM/DL — LOW (ref 32–36)
MCHC RBC-ENTMCNC: 32.7 PG — SIGNIFICANT CHANGE UP (ref 27–34)
MCV RBC AUTO: 102.6 FL — HIGH (ref 80–100)
NRBC # BLD: 0 /100 WBCS — SIGNIFICANT CHANGE UP (ref 0–0)
NRBC # FLD: 0 K/UL — SIGNIFICANT CHANGE UP (ref 0–0)
PHOSPHATE SERPL-MCNC: 3.2 MG/DL — SIGNIFICANT CHANGE UP (ref 2.5–4.5)
PLATELET # BLD AUTO: 197 K/UL — SIGNIFICANT CHANGE UP (ref 150–400)
POTASSIUM SERPL-MCNC: 4.4 MMOL/L — SIGNIFICANT CHANGE UP (ref 3.5–5.3)
POTASSIUM SERPL-SCNC: 4.4 MMOL/L — SIGNIFICANT CHANGE UP (ref 3.5–5.3)
PROT SERPL-MCNC: 5.7 G/DL — LOW (ref 6–8.3)
RBC # BLD: 3.42 M/UL — LOW (ref 3.8–5.2)
RBC # FLD: 12.5 % — SIGNIFICANT CHANGE UP (ref 10.3–14.5)
SODIUM SERPL-SCNC: 139 MMOL/L — SIGNIFICANT CHANGE UP (ref 135–145)
SPECIMEN SOURCE: SIGNIFICANT CHANGE UP
SPECIMEN SOURCE: SIGNIFICANT CHANGE UP
WBC # BLD: 4.41 K/UL — SIGNIFICANT CHANGE UP (ref 3.8–10.5)
WBC # FLD AUTO: 4.41 K/UL — SIGNIFICANT CHANGE UP (ref 3.8–10.5)

## 2024-06-16 PROCEDURE — 99232 SBSQ HOSP IP/OBS MODERATE 35: CPT

## 2024-06-16 RX ORDER — ACETAMINOPHEN 325 MG
1000 TABLET ORAL ONCE
Refills: 0 | Status: DISCONTINUED | OUTPATIENT
Start: 2024-06-16 | End: 2024-06-16

## 2024-06-16 RX ORDER — ACETAMINOPHEN 325 MG
650 TABLET ORAL EVERY 6 HOURS
Refills: 0 | Status: DISCONTINUED | OUTPATIENT
Start: 2024-06-16 | End: 2024-06-26

## 2024-06-16 RX ORDER — ACETAMINOPHEN 325 MG
1000 TABLET ORAL
Refills: 0 | Status: COMPLETED | OUTPATIENT
Start: 2024-06-16 | End: 2024-06-16

## 2024-06-16 RX ORDER — MAGNESIUM SULFATE 100 %
2 POWDER (GRAM) MISCELLANEOUS ONCE
Refills: 0 | Status: COMPLETED | OUTPATIENT
Start: 2024-06-16 | End: 2024-06-16

## 2024-06-16 RX ADMIN — OXYCODONE HYDROCHLORIDE 10 MILLIGRAM(S): 100 SOLUTION ORAL at 10:24

## 2024-06-16 RX ADMIN — TIOTROPIUM BROMIDE 2 PUFF(S): 18 CAPSULE ORAL; RESPIRATORY (INHALATION) at 09:23

## 2024-06-16 RX ADMIN — ASPIRIN 81 MILLIGRAM(S): 325 TABLET, FILM COATED ORAL at 09:26

## 2024-06-16 RX ADMIN — NICOTINE POLACRILEX 1 PATCH: 2 LOZENGE ORAL at 13:07

## 2024-06-16 RX ADMIN — OXYCODONE HYDROCHLORIDE 10 MILLIGRAM(S): 100 SOLUTION ORAL at 19:12

## 2024-06-16 RX ADMIN — OXYCODONE HYDROCHLORIDE 10 MILLIGRAM(S): 100 SOLUTION ORAL at 05:02

## 2024-06-16 RX ADMIN — Medication 650 MILLIGRAM(S): at 06:34

## 2024-06-16 RX ADMIN — RIVAROXABAN 2.5 MILLIGRAM(S): 10 TABLET, FILM COATED ORAL at 05:36

## 2024-06-16 RX ADMIN — Medication 400 MILLIGRAM(S): at 11:22

## 2024-06-16 RX ADMIN — Medication 650 MILLIGRAM(S): at 01:46

## 2024-06-16 RX ADMIN — Medication 2 PUFF(S): at 16:05

## 2024-06-16 RX ADMIN — OXYCODONE HYDROCHLORIDE 10 MILLIGRAM(S): 100 SOLUTION ORAL at 09:24

## 2024-06-16 RX ADMIN — Medication 1 APPLICATION(S): at 11:22

## 2024-06-16 RX ADMIN — Medication 650 MILLIGRAM(S): at 05:36

## 2024-06-16 RX ADMIN — ATORVASTATIN CALCIUM 40 MILLIGRAM(S): 20 TABLET, FILM COATED ORAL at 22:21

## 2024-06-16 RX ADMIN — NICOTINE POLACRILEX 1 PATCH: 2 LOZENGE ORAL at 07:07

## 2024-06-16 RX ADMIN — Medication 25 GRAM(S): at 11:22

## 2024-06-16 RX ADMIN — Medication 400 MILLIGRAM(S): at 13:39

## 2024-06-16 RX ADMIN — Medication 1 TABLET(S): at 09:26

## 2024-06-16 RX ADMIN — VANCOMYCIN HYDROCHLORIDE 250 MILLIGRAM(S): KIT at 22:22

## 2024-06-16 RX ADMIN — CLONIDINE HYDROCHLORIDE 0.1 MILLIGRAM(S): 0.3 TABLET ORAL at 05:36

## 2024-06-16 RX ADMIN — OXYCODONE HYDROCHLORIDE 10 MILLIGRAM(S): 100 SOLUTION ORAL at 04:02

## 2024-06-16 RX ADMIN — OXYCODONE HYDROCHLORIDE 10 MILLIGRAM(S): 100 SOLUTION ORAL at 13:39

## 2024-06-16 RX ADMIN — OXYCODONE HYDROCHLORIDE 10 MILLIGRAM(S): 100 SOLUTION ORAL at 14:39

## 2024-06-16 RX ADMIN — Medication 400 MILLIGRAM(S): at 22:22

## 2024-06-16 RX ADMIN — VANCOMYCIN HYDROCHLORIDE 250 MILLIGRAM(S): KIT at 09:24

## 2024-06-16 RX ADMIN — OXYCODONE HYDROCHLORIDE 10 MILLIGRAM(S): 100 SOLUTION ORAL at 22:15

## 2024-06-16 RX ADMIN — Medication 2 PUFF(S): at 09:24

## 2024-06-16 RX ADMIN — OXYCODONE HYDROCHLORIDE 10 MILLIGRAM(S): 100 SOLUTION ORAL at 22:22

## 2024-06-16 RX ADMIN — Medication 400 MILLIGRAM(S): at 05:35

## 2024-06-16 RX ADMIN — OXYCODONE HYDROCHLORIDE 10 MILLIGRAM(S): 100 SOLUTION ORAL at 18:12

## 2024-06-17 ENCOUNTER — RESULT REVIEW (OUTPATIENT)
Age: 79
End: 2024-06-17

## 2024-06-17 LAB
ALBUMIN SERPL ELPH-MCNC: 2.7 G/DL — LOW (ref 3.3–5)
ALP SERPL-CCNC: 80 U/L — SIGNIFICANT CHANGE UP (ref 40–120)
ALT FLD-CCNC: 11 U/L — SIGNIFICANT CHANGE UP (ref 4–33)
ANION GAP SERPL CALC-SCNC: 10 MMOL/L — SIGNIFICANT CHANGE UP (ref 7–14)
AST SERPL-CCNC: 13 U/L — SIGNIFICANT CHANGE UP (ref 4–32)
BILIRUB SERPL-MCNC: 0.2 MG/DL — SIGNIFICANT CHANGE UP (ref 0.2–1.2)
BUN SERPL-MCNC: 18 MG/DL — SIGNIFICANT CHANGE UP (ref 7–23)
CALCIUM SERPL-MCNC: 8.2 MG/DL — LOW (ref 8.4–10.5)
CHLORIDE SERPL-SCNC: 102 MMOL/L — SIGNIFICANT CHANGE UP (ref 98–107)
CO2 SERPL-SCNC: 29 MMOL/L — SIGNIFICANT CHANGE UP (ref 22–31)
CREAT SERPL-MCNC: 0.73 MG/DL — SIGNIFICANT CHANGE UP (ref 0.5–1.3)
EGFR: 85 ML/MIN/1.73M2 — SIGNIFICANT CHANGE UP
GLUCOSE SERPL-MCNC: 77 MG/DL — SIGNIFICANT CHANGE UP (ref 70–99)
HCT VFR BLD CALC: 36.7 % — SIGNIFICANT CHANGE UP (ref 34.5–45)
HGB BLD-MCNC: 11.5 G/DL — SIGNIFICANT CHANGE UP (ref 11.5–15.5)
MAGNESIUM SERPL-MCNC: 2.3 MG/DL — SIGNIFICANT CHANGE UP (ref 1.6–2.6)
MCHC RBC-ENTMCNC: 31.3 GM/DL — LOW (ref 32–36)
MCHC RBC-ENTMCNC: 32 PG — SIGNIFICANT CHANGE UP (ref 27–34)
MCV RBC AUTO: 102.2 FL — HIGH (ref 80–100)
NRBC # BLD: 0 /100 WBCS — SIGNIFICANT CHANGE UP (ref 0–0)
NRBC # FLD: 0 K/UL — SIGNIFICANT CHANGE UP (ref 0–0)
PHOSPHATE SERPL-MCNC: 3.4 MG/DL — SIGNIFICANT CHANGE UP (ref 2.5–4.5)
PLATELET # BLD AUTO: 205 K/UL — SIGNIFICANT CHANGE UP (ref 150–400)
POTASSIUM SERPL-MCNC: 4.2 MMOL/L — SIGNIFICANT CHANGE UP (ref 3.5–5.3)
POTASSIUM SERPL-SCNC: 4.2 MMOL/L — SIGNIFICANT CHANGE UP (ref 3.5–5.3)
PROT SERPL-MCNC: 6.1 G/DL — SIGNIFICANT CHANGE UP (ref 6–8.3)
RBC # BLD: 3.59 M/UL — LOW (ref 3.8–5.2)
RBC # FLD: 12.5 % — SIGNIFICANT CHANGE UP (ref 10.3–14.5)
SODIUM SERPL-SCNC: 141 MMOL/L — SIGNIFICANT CHANGE UP (ref 135–145)
WBC # BLD: 5.24 K/UL — SIGNIFICANT CHANGE UP (ref 3.8–10.5)
WBC # FLD AUTO: 5.24 K/UL — SIGNIFICANT CHANGE UP (ref 3.8–10.5)

## 2024-06-17 PROCEDURE — 93306 TTE W/DOPPLER COMPLETE: CPT | Mod: 26

## 2024-06-17 PROCEDURE — 73723 MRI JOINT LWR EXTR W/O&W/DYE: CPT | Mod: 26,RT

## 2024-06-17 PROCEDURE — 99233 SBSQ HOSP IP/OBS HIGH 50: CPT

## 2024-06-17 PROCEDURE — 99232 SBSQ HOSP IP/OBS MODERATE 35: CPT

## 2024-06-17 RX ORDER — ALPRAZOLAM 2 MG/1
0.25 TABLET ORAL ONCE
Refills: 0 | Status: DISCONTINUED | OUTPATIENT
Start: 2024-06-17 | End: 2024-06-17

## 2024-06-17 RX ADMIN — VANCOMYCIN HYDROCHLORIDE 250 MILLIGRAM(S): KIT at 21:52

## 2024-06-17 RX ADMIN — Medication 650 MILLIGRAM(S): at 15:08

## 2024-06-17 RX ADMIN — Medication 650 MILLIGRAM(S): at 05:05

## 2024-06-17 RX ADMIN — VANCOMYCIN HYDROCHLORIDE 250 MILLIGRAM(S): KIT at 11:11

## 2024-06-17 RX ADMIN — OXYCODONE HYDROCHLORIDE 10 MILLIGRAM(S): 100 SOLUTION ORAL at 07:49

## 2024-06-17 RX ADMIN — Medication 400 MILLIGRAM(S): at 21:42

## 2024-06-17 RX ADMIN — OXYCODONE HYDROCHLORIDE 10 MILLIGRAM(S): 100 SOLUTION ORAL at 22:47

## 2024-06-17 RX ADMIN — OXYCODONE HYDROCHLORIDE 10 MILLIGRAM(S): 100 SOLUTION ORAL at 06:49

## 2024-06-17 RX ADMIN — Medication 1 APPLICATION(S): at 11:12

## 2024-06-17 RX ADMIN — OXYCODONE HYDROCHLORIDE 10 MILLIGRAM(S): 100 SOLUTION ORAL at 12:30

## 2024-06-17 RX ADMIN — Medication 650 MILLIGRAM(S): at 16:21

## 2024-06-17 RX ADMIN — ALPRAZOLAM 0.25 MILLIGRAM(S): 2 TABLET ORAL at 21:48

## 2024-06-17 RX ADMIN — Medication 2 PUFF(S): at 11:09

## 2024-06-17 RX ADMIN — Medication 6 MILLIGRAM(S): at 01:26

## 2024-06-17 RX ADMIN — OXYCODONE HYDROCHLORIDE 10 MILLIGRAM(S): 100 SOLUTION ORAL at 17:22

## 2024-06-17 RX ADMIN — CLONIDINE HYDROCHLORIDE 0.1 MILLIGRAM(S): 0.3 TABLET ORAL at 05:10

## 2024-06-17 RX ADMIN — Medication 1 TABLET(S): at 11:12

## 2024-06-17 RX ADMIN — RIVAROXABAN 2.5 MILLIGRAM(S): 10 TABLET, FILM COATED ORAL at 05:10

## 2024-06-17 RX ADMIN — Medication 400 MILLIGRAM(S): at 15:08

## 2024-06-17 RX ADMIN — Medication 2 PUFF(S): at 21:42

## 2024-06-17 RX ADMIN — TIOTROPIUM BROMIDE 2 PUFF(S): 18 CAPSULE ORAL; RESPIRATORY (INHALATION) at 11:09

## 2024-06-17 RX ADMIN — OXYCODONE HYDROCHLORIDE 10 MILLIGRAM(S): 100 SOLUTION ORAL at 21:47

## 2024-06-17 RX ADMIN — OXYCODONE HYDROCHLORIDE 10 MILLIGRAM(S): 100 SOLUTION ORAL at 11:07

## 2024-06-17 RX ADMIN — RIVAROXABAN 2.5 MILLIGRAM(S): 10 TABLET, FILM COATED ORAL at 18:35

## 2024-06-17 RX ADMIN — Medication 650 MILLIGRAM(S): at 04:08

## 2024-06-17 RX ADMIN — OXYCODONE HYDROCHLORIDE 10 MILLIGRAM(S): 100 SOLUTION ORAL at 02:46

## 2024-06-17 RX ADMIN — ASPIRIN 81 MILLIGRAM(S): 325 TABLET, FILM COATED ORAL at 11:11

## 2024-06-17 RX ADMIN — OXYCODONE HYDROCHLORIDE 10 MILLIGRAM(S): 100 SOLUTION ORAL at 03:39

## 2024-06-17 RX ADMIN — ATORVASTATIN CALCIUM 40 MILLIGRAM(S): 20 TABLET, FILM COATED ORAL at 21:42

## 2024-06-17 RX ADMIN — OXYCODONE HYDROCHLORIDE 10 MILLIGRAM(S): 100 SOLUTION ORAL at 16:18

## 2024-06-18 DIAGNOSIS — I35.0 NONRHEUMATIC AORTIC (VALVE) STENOSIS: ICD-10-CM

## 2024-06-18 LAB
ALBUMIN SERPL ELPH-MCNC: 2.8 G/DL — LOW (ref 3.3–5)
ALP SERPL-CCNC: 79 U/L — SIGNIFICANT CHANGE UP (ref 40–120)
ALT FLD-CCNC: 9 U/L — SIGNIFICANT CHANGE UP (ref 4–33)
ANION GAP SERPL CALC-SCNC: 5 MMOL/L — LOW (ref 7–14)
AST SERPL-CCNC: 13 U/L — SIGNIFICANT CHANGE UP (ref 4–32)
BASOPHILS # BLD AUTO: 0.03 K/UL — SIGNIFICANT CHANGE UP (ref 0–0.2)
BASOPHILS NFR BLD AUTO: 0.5 % — SIGNIFICANT CHANGE UP (ref 0–2)
BILIRUB SERPL-MCNC: 0.2 MG/DL — SIGNIFICANT CHANGE UP (ref 0.2–1.2)
BUN SERPL-MCNC: 14 MG/DL — SIGNIFICANT CHANGE UP (ref 7–23)
CALCIUM SERPL-MCNC: 8.5 MG/DL — SIGNIFICANT CHANGE UP (ref 8.4–10.5)
CHLORIDE SERPL-SCNC: 101 MMOL/L — SIGNIFICANT CHANGE UP (ref 98–107)
CO2 SERPL-SCNC: 36 MMOL/L — HIGH (ref 22–31)
CREAT SERPL-MCNC: 0.76 MG/DL — SIGNIFICANT CHANGE UP (ref 0.5–1.3)
EGFR: 81 ML/MIN/1.73M2 — SIGNIFICANT CHANGE UP
EOSINOPHIL # BLD AUTO: 0.17 K/UL — SIGNIFICANT CHANGE UP (ref 0–0.5)
EOSINOPHIL NFR BLD AUTO: 3 % — SIGNIFICANT CHANGE UP (ref 0–6)
GLUCOSE SERPL-MCNC: 84 MG/DL — SIGNIFICANT CHANGE UP (ref 70–99)
HCT VFR BLD CALC: 39.2 % — SIGNIFICANT CHANGE UP (ref 34.5–45)
HGB BLD-MCNC: 12.1 G/DL — SIGNIFICANT CHANGE UP (ref 11.5–15.5)
IANC: 4.34 K/UL — SIGNIFICANT CHANGE UP (ref 1.8–7.4)
IMM GRANULOCYTES NFR BLD AUTO: 0.4 % — SIGNIFICANT CHANGE UP (ref 0–0.9)
LYMPHOCYTES # BLD AUTO: 0.44 K/UL — LOW (ref 1–3.3)
LYMPHOCYTES # BLD AUTO: 7.7 % — LOW (ref 13–44)
MAGNESIUM SERPL-MCNC: 2.2 MG/DL — SIGNIFICANT CHANGE UP (ref 1.6–2.6)
MCHC RBC-ENTMCNC: 30.9 GM/DL — LOW (ref 32–36)
MCHC RBC-ENTMCNC: 32.3 PG — SIGNIFICANT CHANGE UP (ref 27–34)
MCV RBC AUTO: 104.5 FL — HIGH (ref 80–100)
MONOCYTES # BLD AUTO: 0.71 K/UL — SIGNIFICANT CHANGE UP (ref 0–0.9)
MONOCYTES NFR BLD AUTO: 12.4 % — SIGNIFICANT CHANGE UP (ref 2–14)
NEUTROPHILS # BLD AUTO: 4.34 K/UL — SIGNIFICANT CHANGE UP (ref 1.8–7.4)
NEUTROPHILS NFR BLD AUTO: 76 % — SIGNIFICANT CHANGE UP (ref 43–77)
NRBC # BLD: 0 /100 WBCS — SIGNIFICANT CHANGE UP (ref 0–0)
NRBC # FLD: 0 K/UL — SIGNIFICANT CHANGE UP (ref 0–0)
PHOSPHATE SERPL-MCNC: 3.7 MG/DL — SIGNIFICANT CHANGE UP (ref 2.5–4.5)
PLATELET # BLD AUTO: 215 K/UL — SIGNIFICANT CHANGE UP (ref 150–400)
POTASSIUM SERPL-MCNC: 4.3 MMOL/L — SIGNIFICANT CHANGE UP (ref 3.5–5.3)
POTASSIUM SERPL-SCNC: 4.3 MMOL/L — SIGNIFICANT CHANGE UP (ref 3.5–5.3)
PROT SERPL-MCNC: 6.3 G/DL — SIGNIFICANT CHANGE UP (ref 6–8.3)
RBC # BLD: 3.75 M/UL — LOW (ref 3.8–5.2)
RBC # FLD: 12.7 % — SIGNIFICANT CHANGE UP (ref 10.3–14.5)
SODIUM SERPL-SCNC: 142 MMOL/L — SIGNIFICANT CHANGE UP (ref 135–145)
VANCOMYCIN TROUGH SERPL-MCNC: 22.9 UG/ML — HIGH (ref 10–20)
WBC # BLD: 5.71 K/UL — SIGNIFICANT CHANGE UP (ref 3.8–10.5)
WBC # FLD AUTO: 5.71 K/UL — SIGNIFICANT CHANGE UP (ref 3.8–10.5)

## 2024-06-18 PROCEDURE — 99232 SBSQ HOSP IP/OBS MODERATE 35: CPT

## 2024-06-18 PROCEDURE — 99233 SBSQ HOSP IP/OBS HIGH 50: CPT

## 2024-06-18 RX ORDER — SENNOSIDES 8.6 MG
2 TABLET ORAL AT BEDTIME
Refills: 0 | Status: DISCONTINUED | OUTPATIENT
Start: 2024-06-18 | End: 2024-06-26

## 2024-06-18 RX ORDER — ALPRAZOLAM 2 MG/1
0.25 TABLET ORAL ONCE
Refills: 0 | Status: DISCONTINUED | OUTPATIENT
Start: 2024-06-18 | End: 2024-06-18

## 2024-06-18 RX ORDER — CLONIDINE HYDROCHLORIDE 0.3 MG/1
0.1 TABLET ORAL
Refills: 0 | Status: DISCONTINUED | OUTPATIENT
Start: 2024-06-18 | End: 2024-06-26

## 2024-06-18 RX ORDER — IPRATROPIUM BROMIDE AND ALBUTEROL SULFATE .5; 3 MG/3ML; MG/3ML
3 SOLUTION RESPIRATORY (INHALATION) EVERY 6 HOURS
Refills: 0 | Status: DISCONTINUED | OUTPATIENT
Start: 2024-06-18 | End: 2024-06-26

## 2024-06-18 RX ORDER — VANCOMYCIN HYDROCHLORIDE 50 MG/ML
1250 KIT ORAL
Refills: 0 | Status: DISCONTINUED | OUTPATIENT
Start: 2024-06-18 | End: 2024-06-20

## 2024-06-18 RX ORDER — OXYCODONE HYDROCHLORIDE 100 MG/5ML
15 SOLUTION ORAL EVERY 4 HOURS
Refills: 0 | Status: DISCONTINUED | OUTPATIENT
Start: 2024-06-18 | End: 2024-06-25

## 2024-06-18 RX ORDER — OXYCODONE HYDROCHLORIDE 100 MG/5ML
10 SOLUTION ORAL EVERY 4 HOURS
Refills: 0 | Status: DISCONTINUED | OUTPATIENT
Start: 2024-06-18 | End: 2024-06-25

## 2024-06-18 RX ORDER — POLYETHYLENE GLYCOL 3350 1 G/G
17 POWDER ORAL DAILY
Refills: 0 | Status: DISCONTINUED | OUTPATIENT
Start: 2024-06-18 | End: 2024-06-26

## 2024-06-18 RX ADMIN — IPRATROPIUM BROMIDE AND ALBUTEROL SULFATE 3 MILLILITER(S): .5; 3 SOLUTION RESPIRATORY (INHALATION) at 16:13

## 2024-06-18 RX ADMIN — OXYCODONE HYDROCHLORIDE 10 MILLIGRAM(S): 100 SOLUTION ORAL at 04:00

## 2024-06-18 RX ADMIN — RIVAROXABAN 2.5 MILLIGRAM(S): 10 TABLET, FILM COATED ORAL at 05:23

## 2024-06-18 RX ADMIN — Medication 400 MILLIGRAM(S): at 12:42

## 2024-06-18 RX ADMIN — OXYCODONE HYDROCHLORIDE 10 MILLIGRAM(S): 100 SOLUTION ORAL at 05:00

## 2024-06-18 RX ADMIN — OXYCODONE HYDROCHLORIDE 15 MILLIGRAM(S): 100 SOLUTION ORAL at 23:08

## 2024-06-18 RX ADMIN — ATORVASTATIN CALCIUM 40 MILLIGRAM(S): 20 TABLET, FILM COATED ORAL at 23:09

## 2024-06-18 RX ADMIN — VANCOMYCIN HYDROCHLORIDE 166.67 MILLIGRAM(S): KIT at 23:17

## 2024-06-18 RX ADMIN — Medication 400 MILLIGRAM(S): at 23:08

## 2024-06-18 RX ADMIN — OXYCODONE HYDROCHLORIDE 10 MILLIGRAM(S): 100 SOLUTION ORAL at 14:40

## 2024-06-18 RX ADMIN — Medication 2 PUFF(S): at 23:17

## 2024-06-18 RX ADMIN — OXYCODONE HYDROCHLORIDE 15 MILLIGRAM(S): 100 SOLUTION ORAL at 17:37

## 2024-06-18 RX ADMIN — OXYCODONE HYDROCHLORIDE 10 MILLIGRAM(S): 100 SOLUTION ORAL at 10:44

## 2024-06-18 RX ADMIN — Medication 1 TABLET(S): at 12:36

## 2024-06-18 RX ADMIN — RIVAROXABAN 2.5 MILLIGRAM(S): 10 TABLET, FILM COATED ORAL at 17:36

## 2024-06-18 RX ADMIN — OXYCODONE HYDROCHLORIDE 10 MILLIGRAM(S): 100 SOLUTION ORAL at 13:32

## 2024-06-18 RX ADMIN — CLONIDINE HYDROCHLORIDE 0.1 MILLIGRAM(S): 0.3 TABLET ORAL at 17:41

## 2024-06-18 RX ADMIN — CLONIDINE HYDROCHLORIDE 0.1 MILLIGRAM(S): 0.3 TABLET ORAL at 05:23

## 2024-06-18 RX ADMIN — Medication 2 TABLET(S): at 23:08

## 2024-06-18 RX ADMIN — Medication 6 MILLIGRAM(S): at 23:08

## 2024-06-18 RX ADMIN — OXYCODONE HYDROCHLORIDE 10 MILLIGRAM(S): 100 SOLUTION ORAL at 09:44

## 2024-06-18 RX ADMIN — Medication 1 APPLICATION(S): at 12:37

## 2024-06-18 RX ADMIN — ASPIRIN 81 MILLIGRAM(S): 325 TABLET, FILM COATED ORAL at 12:37

## 2024-06-18 RX ADMIN — Medication 400 MILLIGRAM(S): at 05:22

## 2024-06-19 LAB
ALBUMIN SERPL ELPH-MCNC: 2.7 G/DL — LOW (ref 3.3–5)
ALP SERPL-CCNC: 76 U/L — SIGNIFICANT CHANGE UP (ref 40–120)
ALT FLD-CCNC: 10 U/L — SIGNIFICANT CHANGE UP (ref 4–33)
ANION GAP SERPL CALC-SCNC: 7 MMOL/L — SIGNIFICANT CHANGE UP (ref 7–14)
AST SERPL-CCNC: 12 U/L — SIGNIFICANT CHANGE UP (ref 4–32)
BASOPHILS # BLD AUTO: 0.03 K/UL — SIGNIFICANT CHANGE UP (ref 0–0.2)
BASOPHILS NFR BLD AUTO: 0.5 % — SIGNIFICANT CHANGE UP (ref 0–2)
BILIRUB SERPL-MCNC: 0.2 MG/DL — SIGNIFICANT CHANGE UP (ref 0.2–1.2)
BUN SERPL-MCNC: 19 MG/DL — SIGNIFICANT CHANGE UP (ref 7–23)
CALCIUM SERPL-MCNC: 8.4 MG/DL — SIGNIFICANT CHANGE UP (ref 8.4–10.5)
CHLORIDE SERPL-SCNC: 99 MMOL/L — SIGNIFICANT CHANGE UP (ref 98–107)
CO2 SERPL-SCNC: 33 MMOL/L — HIGH (ref 22–31)
CREAT SERPL-MCNC: 0.75 MG/DL — SIGNIFICANT CHANGE UP (ref 0.5–1.3)
EGFR: 82 ML/MIN/1.73M2 — SIGNIFICANT CHANGE UP
EOSINOPHIL # BLD AUTO: 0.14 K/UL — SIGNIFICANT CHANGE UP (ref 0–0.5)
EOSINOPHIL NFR BLD AUTO: 2.4 % — SIGNIFICANT CHANGE UP (ref 0–6)
GLUCOSE SERPL-MCNC: 108 MG/DL — HIGH (ref 70–99)
HCT VFR BLD CALC: 38.7 % — SIGNIFICANT CHANGE UP (ref 34.5–45)
HGB BLD-MCNC: 12.6 G/DL — SIGNIFICANT CHANGE UP (ref 11.5–15.5)
IANC: 4.6 K/UL — SIGNIFICANT CHANGE UP (ref 1.8–7.4)
IMM GRANULOCYTES NFR BLD AUTO: 0.3 % — SIGNIFICANT CHANGE UP (ref 0–0.9)
LYMPHOCYTES # BLD AUTO: 0.42 K/UL — LOW (ref 1–3.3)
LYMPHOCYTES # BLD AUTO: 7.3 % — LOW (ref 13–44)
MAGNESIUM SERPL-MCNC: 2.1 MG/DL — SIGNIFICANT CHANGE UP (ref 1.6–2.6)
MCHC RBC-ENTMCNC: 32.6 GM/DL — SIGNIFICANT CHANGE UP (ref 32–36)
MCHC RBC-ENTMCNC: 33.1 PG — SIGNIFICANT CHANGE UP (ref 27–34)
MCV RBC AUTO: 101.6 FL — HIGH (ref 80–100)
MONOCYTES # BLD AUTO: 0.55 K/UL — SIGNIFICANT CHANGE UP (ref 0–0.9)
MONOCYTES NFR BLD AUTO: 9.5 % — SIGNIFICANT CHANGE UP (ref 2–14)
NEUTROPHILS # BLD AUTO: 4.6 K/UL — SIGNIFICANT CHANGE UP (ref 1.8–7.4)
NEUTROPHILS NFR BLD AUTO: 80 % — HIGH (ref 43–77)
NRBC # BLD: 0 /100 WBCS — SIGNIFICANT CHANGE UP (ref 0–0)
NRBC # FLD: 0 K/UL — SIGNIFICANT CHANGE UP (ref 0–0)
PHOSPHATE SERPL-MCNC: 4.1 MG/DL — SIGNIFICANT CHANGE UP (ref 2.5–4.5)
PLATELET # BLD AUTO: 226 K/UL — SIGNIFICANT CHANGE UP (ref 150–400)
POTASSIUM SERPL-MCNC: 4.4 MMOL/L — SIGNIFICANT CHANGE UP (ref 3.5–5.3)
POTASSIUM SERPL-SCNC: 4.4 MMOL/L — SIGNIFICANT CHANGE UP (ref 3.5–5.3)
PROT SERPL-MCNC: 6.2 G/DL — SIGNIFICANT CHANGE UP (ref 6–8.3)
RBC # BLD: 3.81 M/UL — SIGNIFICANT CHANGE UP (ref 3.8–5.2)
RBC # FLD: 12.6 % — SIGNIFICANT CHANGE UP (ref 10.3–14.5)
SODIUM SERPL-SCNC: 139 MMOL/L — SIGNIFICANT CHANGE UP (ref 135–145)
WBC # BLD: 5.76 K/UL — SIGNIFICANT CHANGE UP (ref 3.8–10.5)
WBC # FLD AUTO: 5.76 K/UL — SIGNIFICANT CHANGE UP (ref 3.8–10.5)

## 2024-06-19 PROCEDURE — 99232 SBSQ HOSP IP/OBS MODERATE 35: CPT

## 2024-06-19 RX ORDER — ALPRAZOLAM 2 MG/1
0.25 TABLET ORAL ONCE
Refills: 0 | Status: DISCONTINUED | OUTPATIENT
Start: 2024-06-19 | End: 2024-06-19

## 2024-06-19 RX ORDER — FUROSEMIDE 10 MG/ML
20 INJECTION, SOLUTION INTRAMUSCULAR; INTRAVENOUS DAILY
Refills: 0 | Status: DISCONTINUED | OUTPATIENT
Start: 2024-06-19 | End: 2024-06-26

## 2024-06-19 RX ADMIN — ALPRAZOLAM 0.25 MILLIGRAM(S): 2 TABLET ORAL at 22:57

## 2024-06-19 RX ADMIN — Medication 400 MILLIGRAM(S): at 05:47

## 2024-06-19 RX ADMIN — OXYCODONE HYDROCHLORIDE 15 MILLIGRAM(S): 100 SOLUTION ORAL at 10:19

## 2024-06-19 RX ADMIN — OXYCODONE HYDROCHLORIDE 10 MILLIGRAM(S): 100 SOLUTION ORAL at 05:47

## 2024-06-19 RX ADMIN — OXYCODONE HYDROCHLORIDE 15 MILLIGRAM(S): 100 SOLUTION ORAL at 23:34

## 2024-06-19 RX ADMIN — VANCOMYCIN HYDROCHLORIDE 166.67 MILLIGRAM(S): KIT at 21:55

## 2024-06-19 RX ADMIN — ASPIRIN 81 MILLIGRAM(S): 325 TABLET, FILM COATED ORAL at 16:09

## 2024-06-19 RX ADMIN — OXYCODONE HYDROCHLORIDE 15 MILLIGRAM(S): 100 SOLUTION ORAL at 22:34

## 2024-06-19 RX ADMIN — OXYCODONE HYDROCHLORIDE 15 MILLIGRAM(S): 100 SOLUTION ORAL at 00:08

## 2024-06-19 RX ADMIN — OXYCODONE HYDROCHLORIDE 15 MILLIGRAM(S): 100 SOLUTION ORAL at 11:19

## 2024-06-19 RX ADMIN — Medication 2 PUFF(S): at 21:56

## 2024-06-19 RX ADMIN — RIVAROXABAN 2.5 MILLIGRAM(S): 10 TABLET, FILM COATED ORAL at 16:09

## 2024-06-19 RX ADMIN — Medication 400 MILLIGRAM(S): at 13:19

## 2024-06-19 RX ADMIN — Medication 400 MILLIGRAM(S): at 21:55

## 2024-06-19 RX ADMIN — IPRATROPIUM BROMIDE AND ALBUTEROL SULFATE 3 MILLILITER(S): .5; 3 SOLUTION RESPIRATORY (INHALATION) at 09:49

## 2024-06-19 RX ADMIN — RIVAROXABAN 2.5 MILLIGRAM(S): 10 TABLET, FILM COATED ORAL at 05:41

## 2024-06-19 RX ADMIN — OXYCODONE HYDROCHLORIDE 10 MILLIGRAM(S): 100 SOLUTION ORAL at 06:47

## 2024-06-19 RX ADMIN — Medication 1 APPLICATION(S): at 10:22

## 2024-06-19 RX ADMIN — CLONIDINE HYDROCHLORIDE 0.1 MILLIGRAM(S): 0.3 TABLET ORAL at 16:11

## 2024-06-19 RX ADMIN — Medication 60 MILLIGRAM(S): at 05:41

## 2024-06-19 RX ADMIN — ATORVASTATIN CALCIUM 40 MILLIGRAM(S): 20 TABLET, FILM COATED ORAL at 21:57

## 2024-06-19 RX ADMIN — Medication 2 PUFF(S): at 08:31

## 2024-06-19 RX ADMIN — OXYCODONE HYDROCHLORIDE 15 MILLIGRAM(S): 100 SOLUTION ORAL at 16:10

## 2024-06-19 RX ADMIN — OXYCODONE HYDROCHLORIDE 15 MILLIGRAM(S): 100 SOLUTION ORAL at 17:10

## 2024-06-19 RX ADMIN — CLONIDINE HYDROCHLORIDE 0.1 MILLIGRAM(S): 0.3 TABLET ORAL at 05:41

## 2024-06-20 LAB
ALBUMIN SERPL ELPH-MCNC: 2.7 G/DL — LOW (ref 3.3–5)
ALP SERPL-CCNC: 70 U/L — SIGNIFICANT CHANGE UP (ref 40–120)
ALT FLD-CCNC: 7 U/L — SIGNIFICANT CHANGE UP (ref 4–33)
ANION GAP SERPL CALC-SCNC: 13 MMOL/L — SIGNIFICANT CHANGE UP (ref 7–14)
AST SERPL-CCNC: 15 U/L — SIGNIFICANT CHANGE UP (ref 4–32)
BASOPHILS # BLD AUTO: 0.03 K/UL — SIGNIFICANT CHANGE UP (ref 0–0.2)
BASOPHILS NFR BLD AUTO: 0.6 % — SIGNIFICANT CHANGE UP (ref 0–2)
BILIRUB SERPL-MCNC: 0.3 MG/DL — SIGNIFICANT CHANGE UP (ref 0.2–1.2)
BUN SERPL-MCNC: 17 MG/DL — SIGNIFICANT CHANGE UP (ref 7–23)
CALCIUM SERPL-MCNC: 8.2 MG/DL — LOW (ref 8.4–10.5)
CHLORIDE SERPL-SCNC: 101 MMOL/L — SIGNIFICANT CHANGE UP (ref 98–107)
CO2 SERPL-SCNC: 26 MMOL/L — SIGNIFICANT CHANGE UP (ref 22–31)
CREAT SERPL-MCNC: 0.78 MG/DL — SIGNIFICANT CHANGE UP (ref 0.5–1.3)
EGFR: 79 ML/MIN/1.73M2 — SIGNIFICANT CHANGE UP
EOSINOPHIL # BLD AUTO: 0.14 K/UL — SIGNIFICANT CHANGE UP (ref 0–0.5)
EOSINOPHIL NFR BLD AUTO: 2.7 % — SIGNIFICANT CHANGE UP (ref 0–6)
GLUCOSE SERPL-MCNC: 85 MG/DL — SIGNIFICANT CHANGE UP (ref 70–99)
HCT VFR BLD CALC: 35.9 % — SIGNIFICANT CHANGE UP (ref 34.5–45)
HGB BLD-MCNC: 11.7 G/DL — SIGNIFICANT CHANGE UP (ref 11.5–15.5)
IANC: 3.75 K/UL — SIGNIFICANT CHANGE UP (ref 1.8–7.4)
IMM GRANULOCYTES NFR BLD AUTO: 1.4 % — HIGH (ref 0–0.9)
LYMPHOCYTES # BLD AUTO: 0.5 K/UL — LOW (ref 1–3.3)
LYMPHOCYTES # BLD AUTO: 9.8 % — LOW (ref 13–44)
MAGNESIUM SERPL-MCNC: 2.2 MG/DL — SIGNIFICANT CHANGE UP (ref 1.6–2.6)
MCHC RBC-ENTMCNC: 32.6 GM/DL — SIGNIFICANT CHANGE UP (ref 32–36)
MCHC RBC-ENTMCNC: 32.8 PG — SIGNIFICANT CHANGE UP (ref 27–34)
MCV RBC AUTO: 100.6 FL — HIGH (ref 80–100)
MONOCYTES # BLD AUTO: 0.62 K/UL — SIGNIFICANT CHANGE UP (ref 0–0.9)
MONOCYTES NFR BLD AUTO: 12.1 % — SIGNIFICANT CHANGE UP (ref 2–14)
NEUTROPHILS # BLD AUTO: 3.75 K/UL — SIGNIFICANT CHANGE UP (ref 1.8–7.4)
NEUTROPHILS NFR BLD AUTO: 73.4 % — SIGNIFICANT CHANGE UP (ref 43–77)
NRBC # BLD: 0 /100 WBCS — SIGNIFICANT CHANGE UP (ref 0–0)
NRBC # FLD: 0 K/UL — SIGNIFICANT CHANGE UP (ref 0–0)
PHOSPHATE SERPL-MCNC: 4.2 MG/DL — SIGNIFICANT CHANGE UP (ref 2.5–4.5)
PLATELET # BLD AUTO: 215 K/UL — SIGNIFICANT CHANGE UP (ref 150–400)
POTASSIUM SERPL-MCNC: 4.2 MMOL/L — SIGNIFICANT CHANGE UP (ref 3.5–5.3)
POTASSIUM SERPL-SCNC: 4.2 MMOL/L — SIGNIFICANT CHANGE UP (ref 3.5–5.3)
PROT SERPL-MCNC: 6 G/DL — SIGNIFICANT CHANGE UP (ref 6–8.3)
RBC # BLD: 3.57 M/UL — LOW (ref 3.8–5.2)
RBC # FLD: 12.8 % — SIGNIFICANT CHANGE UP (ref 10.3–14.5)
SODIUM SERPL-SCNC: 140 MMOL/L — SIGNIFICANT CHANGE UP (ref 135–145)
VANCOMYCIN TROUGH SERPL-MCNC: 21.8 UG/ML — HIGH (ref 10–20)
WBC # BLD: 5.11 K/UL — SIGNIFICANT CHANGE UP (ref 3.8–10.5)
WBC # FLD AUTO: 5.11 K/UL — SIGNIFICANT CHANGE UP (ref 3.8–10.5)

## 2024-06-20 PROCEDURE — 99233 SBSQ HOSP IP/OBS HIGH 50: CPT

## 2024-06-20 PROCEDURE — 99232 SBSQ HOSP IP/OBS MODERATE 35: CPT

## 2024-06-20 RX ORDER — VANCOMYCIN HYDROCHLORIDE 50 MG/ML
1000 KIT ORAL EVERY 24 HOURS
Refills: 0 | Status: DISCONTINUED | OUTPATIENT
Start: 2024-06-20 | End: 2024-06-23

## 2024-06-20 RX ORDER — MORPHINE SULFATE 100 MG/1
4 TABLET, EXTENDED RELEASE ORAL ONCE
Refills: 0 | Status: DISCONTINUED | OUTPATIENT
Start: 2024-06-20 | End: 2024-06-20

## 2024-06-20 RX ORDER — ALPRAZOLAM 2 MG/1
0.25 TABLET ORAL ONCE
Refills: 0 | Status: DISCONTINUED | OUTPATIENT
Start: 2024-06-20 | End: 2024-06-20

## 2024-06-20 RX ADMIN — OXYCODONE HYDROCHLORIDE 15 MILLIGRAM(S): 100 SOLUTION ORAL at 06:27

## 2024-06-20 RX ADMIN — RIVAROXABAN 2.5 MILLIGRAM(S): 10 TABLET, FILM COATED ORAL at 17:12

## 2024-06-20 RX ADMIN — IPRATROPIUM BROMIDE AND ALBUTEROL SULFATE 3 MILLILITER(S): .5; 3 SOLUTION RESPIRATORY (INHALATION) at 15:14

## 2024-06-20 RX ADMIN — IPRATROPIUM BROMIDE AND ALBUTEROL SULFATE 3 MILLILITER(S): .5; 3 SOLUTION RESPIRATORY (INHALATION) at 08:34

## 2024-06-20 RX ADMIN — Medication 400 MILLIGRAM(S): at 22:07

## 2024-06-20 RX ADMIN — Medication 90 MILLIGRAM(S): at 06:21

## 2024-06-20 RX ADMIN — Medication 2 PUFF(S): at 22:08

## 2024-06-20 RX ADMIN — OXYCODONE HYDROCHLORIDE 15 MILLIGRAM(S): 100 SOLUTION ORAL at 18:28

## 2024-06-20 RX ADMIN — MORPHINE SULFATE 4 MILLIGRAM(S): 100 TABLET, EXTENDED RELEASE ORAL at 11:50

## 2024-06-20 RX ADMIN — Medication 1 APPLICATION(S): at 11:46

## 2024-06-20 RX ADMIN — OXYCODONE HYDROCHLORIDE 15 MILLIGRAM(S): 100 SOLUTION ORAL at 14:56

## 2024-06-20 RX ADMIN — CLONIDINE HYDROCHLORIDE 0.1 MILLIGRAM(S): 0.3 TABLET ORAL at 17:12

## 2024-06-20 RX ADMIN — ALPRAZOLAM 0.25 MILLIGRAM(S): 2 TABLET ORAL at 22:07

## 2024-06-20 RX ADMIN — Medication 400 MILLIGRAM(S): at 06:21

## 2024-06-20 RX ADMIN — VANCOMYCIN HYDROCHLORIDE 250 MILLIGRAM(S): KIT at 22:15

## 2024-06-20 RX ADMIN — Medication 2 PUFF(S): at 09:24

## 2024-06-20 RX ADMIN — FUROSEMIDE 20 MILLIGRAM(S): 10 INJECTION, SOLUTION INTRAMUSCULAR; INTRAVENOUS at 06:21

## 2024-06-20 RX ADMIN — MORPHINE SULFATE 4 MILLIGRAM(S): 100 TABLET, EXTENDED RELEASE ORAL at 12:15

## 2024-06-20 RX ADMIN — TIOTROPIUM BROMIDE 2 PUFF(S): 18 CAPSULE ORAL; RESPIRATORY (INHALATION) at 09:26

## 2024-06-20 RX ADMIN — Medication 1 TABLET(S): at 11:46

## 2024-06-20 RX ADMIN — OXYCODONE HYDROCHLORIDE 15 MILLIGRAM(S): 100 SOLUTION ORAL at 13:56

## 2024-06-20 RX ADMIN — RIVAROXABAN 2.5 MILLIGRAM(S): 10 TABLET, FILM COATED ORAL at 06:21

## 2024-06-20 RX ADMIN — OXYCODONE HYDROCHLORIDE 15 MILLIGRAM(S): 100 SOLUTION ORAL at 17:56

## 2024-06-20 RX ADMIN — ATORVASTATIN CALCIUM 40 MILLIGRAM(S): 20 TABLET, FILM COATED ORAL at 22:08

## 2024-06-20 RX ADMIN — OXYCODONE HYDROCHLORIDE 15 MILLIGRAM(S): 100 SOLUTION ORAL at 07:27

## 2024-06-20 RX ADMIN — ASPIRIN 81 MILLIGRAM(S): 325 TABLET, FILM COATED ORAL at 11:46

## 2024-06-21 LAB
ANION GAP SERPL CALC-SCNC: 10 MMOL/L — SIGNIFICANT CHANGE UP (ref 7–14)
BASOPHILS # BLD AUTO: 0.03 K/UL — SIGNIFICANT CHANGE UP (ref 0–0.2)
BASOPHILS NFR BLD AUTO: 0.5 % — SIGNIFICANT CHANGE UP (ref 0–2)
BUN SERPL-MCNC: 16 MG/DL — SIGNIFICANT CHANGE UP (ref 7–23)
CALCIUM SERPL-MCNC: 8.1 MG/DL — LOW (ref 8.4–10.5)
CHLORIDE SERPL-SCNC: 100 MMOL/L — SIGNIFICANT CHANGE UP (ref 98–107)
CO2 SERPL-SCNC: 28 MMOL/L — SIGNIFICANT CHANGE UP (ref 22–31)
CREAT SERPL-MCNC: 0.84 MG/DL — SIGNIFICANT CHANGE UP (ref 0.5–1.3)
EGFR: 72 ML/MIN/1.73M2 — SIGNIFICANT CHANGE UP
EOSINOPHIL # BLD AUTO: 0.15 K/UL — SIGNIFICANT CHANGE UP (ref 0–0.5)
EOSINOPHIL NFR BLD AUTO: 2.6 % — SIGNIFICANT CHANGE UP (ref 0–6)
GLUCOSE SERPL-MCNC: 98 MG/DL — SIGNIFICANT CHANGE UP (ref 70–99)
HCT VFR BLD CALC: 37 % — SIGNIFICANT CHANGE UP (ref 34.5–45)
HGB BLD-MCNC: 11.9 G/DL — SIGNIFICANT CHANGE UP (ref 11.5–15.5)
IANC: 4.34 K/UL — SIGNIFICANT CHANGE UP (ref 1.8–7.4)
IMM GRANULOCYTES NFR BLD AUTO: 0.5 % — SIGNIFICANT CHANGE UP (ref 0–0.9)
LYMPHOCYTES # BLD AUTO: 0.56 K/UL — LOW (ref 1–3.3)
LYMPHOCYTES # BLD AUTO: 9.7 % — LOW (ref 13–44)
MAGNESIUM SERPL-MCNC: 2.1 MG/DL — SIGNIFICANT CHANGE UP (ref 1.6–2.6)
MCHC RBC-ENTMCNC: 32.2 GM/DL — SIGNIFICANT CHANGE UP (ref 32–36)
MCHC RBC-ENTMCNC: 32.7 PG — SIGNIFICANT CHANGE UP (ref 27–34)
MCV RBC AUTO: 101.6 FL — HIGH (ref 80–100)
MONOCYTES # BLD AUTO: 0.69 K/UL — SIGNIFICANT CHANGE UP (ref 0–0.9)
MONOCYTES NFR BLD AUTO: 11.9 % — SIGNIFICANT CHANGE UP (ref 2–14)
NEUTROPHILS # BLD AUTO: 4.34 K/UL — SIGNIFICANT CHANGE UP (ref 1.8–7.4)
NEUTROPHILS NFR BLD AUTO: 74.8 % — SIGNIFICANT CHANGE UP (ref 43–77)
NRBC # BLD: 0 /100 WBCS — SIGNIFICANT CHANGE UP (ref 0–0)
NRBC # FLD: 0 K/UL — SIGNIFICANT CHANGE UP (ref 0–0)
PHOSPHATE SERPL-MCNC: 4 MG/DL — SIGNIFICANT CHANGE UP (ref 2.5–4.5)
PLATELET # BLD AUTO: 235 K/UL — SIGNIFICANT CHANGE UP (ref 150–400)
POTASSIUM SERPL-MCNC: 4.3 MMOL/L — SIGNIFICANT CHANGE UP (ref 3.5–5.3)
POTASSIUM SERPL-SCNC: 4.3 MMOL/L — SIGNIFICANT CHANGE UP (ref 3.5–5.3)
RBC # BLD: 3.64 M/UL — LOW (ref 3.8–5.2)
RBC # FLD: 12.8 % — SIGNIFICANT CHANGE UP (ref 10.3–14.5)
SODIUM SERPL-SCNC: 138 MMOL/L — SIGNIFICANT CHANGE UP (ref 135–145)
WBC # BLD: 5.8 K/UL — SIGNIFICANT CHANGE UP (ref 3.8–10.5)
WBC # FLD AUTO: 5.8 K/UL — SIGNIFICANT CHANGE UP (ref 3.8–10.5)

## 2024-06-21 PROCEDURE — 99233 SBSQ HOSP IP/OBS HIGH 50: CPT

## 2024-06-21 PROCEDURE — 99232 SBSQ HOSP IP/OBS MODERATE 35: CPT

## 2024-06-21 RX ORDER — ALPRAZOLAM 2 MG/1
0.25 TABLET ORAL ONCE
Refills: 0 | Status: DISCONTINUED | OUTPATIENT
Start: 2024-06-21 | End: 2024-06-21

## 2024-06-21 RX ADMIN — ATORVASTATIN CALCIUM 40 MILLIGRAM(S): 20 TABLET, FILM COATED ORAL at 22:04

## 2024-06-21 RX ADMIN — OXYCODONE HYDROCHLORIDE 15 MILLIGRAM(S): 100 SOLUTION ORAL at 04:52

## 2024-06-21 RX ADMIN — RIVAROXABAN 2.5 MILLIGRAM(S): 10 TABLET, FILM COATED ORAL at 17:04

## 2024-06-21 RX ADMIN — OXYCODONE HYDROCHLORIDE 15 MILLIGRAM(S): 100 SOLUTION ORAL at 01:15

## 2024-06-21 RX ADMIN — Medication 2 TABLET(S): at 22:04

## 2024-06-21 RX ADMIN — OXYCODONE HYDROCHLORIDE 15 MILLIGRAM(S): 100 SOLUTION ORAL at 10:31

## 2024-06-21 RX ADMIN — VANCOMYCIN HYDROCHLORIDE 250 MILLIGRAM(S): KIT at 22:03

## 2024-06-21 RX ADMIN — OXYCODONE HYDROCHLORIDE 15 MILLIGRAM(S): 100 SOLUTION ORAL at 00:15

## 2024-06-21 RX ADMIN — OXYCODONE HYDROCHLORIDE 15 MILLIGRAM(S): 100 SOLUTION ORAL at 09:31

## 2024-06-21 RX ADMIN — TIOTROPIUM BROMIDE 2 PUFF(S): 18 CAPSULE ORAL; RESPIRATORY (INHALATION) at 09:32

## 2024-06-21 RX ADMIN — Medication 6 MILLIGRAM(S): at 22:03

## 2024-06-21 RX ADMIN — Medication 400 MILLIGRAM(S): at 06:16

## 2024-06-21 RX ADMIN — ALPRAZOLAM 0.25 MILLIGRAM(S): 2 TABLET ORAL at 22:34

## 2024-06-21 RX ADMIN — RIVAROXABAN 2.5 MILLIGRAM(S): 10 TABLET, FILM COATED ORAL at 06:16

## 2024-06-21 RX ADMIN — FUROSEMIDE 20 MILLIGRAM(S): 10 INJECTION, SOLUTION INTRAMUSCULAR; INTRAVENOUS at 06:15

## 2024-06-21 RX ADMIN — CLONIDINE HYDROCHLORIDE 0.1 MILLIGRAM(S): 0.3 TABLET ORAL at 17:03

## 2024-06-21 RX ADMIN — Medication 2 PUFF(S): at 22:07

## 2024-06-21 RX ADMIN — Medication 90 MILLIGRAM(S): at 06:15

## 2024-06-21 RX ADMIN — OXYCODONE HYDROCHLORIDE 10 MILLIGRAM(S): 100 SOLUTION ORAL at 16:18

## 2024-06-21 RX ADMIN — Medication 400 MILLIGRAM(S): at 22:03

## 2024-06-21 RX ADMIN — OXYCODONE HYDROCHLORIDE 15 MILLIGRAM(S): 100 SOLUTION ORAL at 22:03

## 2024-06-21 RX ADMIN — OXYCODONE HYDROCHLORIDE 10 MILLIGRAM(S): 100 SOLUTION ORAL at 17:18

## 2024-06-21 RX ADMIN — Medication 650 MILLIGRAM(S): at 06:17

## 2024-06-21 RX ADMIN — Medication 2 PUFF(S): at 09:32

## 2024-06-21 RX ADMIN — Medication 400 MILLIGRAM(S): at 13:13

## 2024-06-22 LAB — VANCOMYCIN TROUGH SERPL-MCNC: 17 UG/ML — SIGNIFICANT CHANGE UP (ref 10–20)

## 2024-06-22 PROCEDURE — 99233 SBSQ HOSP IP/OBS HIGH 50: CPT

## 2024-06-22 RX ORDER — ALPRAZOLAM 2 MG/1
0.25 TABLET ORAL ONCE
Refills: 0 | Status: DISCONTINUED | OUTPATIENT
Start: 2024-06-22 | End: 2024-06-22

## 2024-06-22 RX ORDER — RIVAROXABAN 10 MG/1
2.5 TABLET, FILM COATED ORAL
Refills: 0 | Status: DISCONTINUED | OUTPATIENT
Start: 2024-06-22 | End: 2024-06-26

## 2024-06-22 RX ADMIN — OXYCODONE HYDROCHLORIDE 15 MILLIGRAM(S): 100 SOLUTION ORAL at 04:29

## 2024-06-22 RX ADMIN — RIVAROXABAN 2.5 MILLIGRAM(S): 10 TABLET, FILM COATED ORAL at 06:29

## 2024-06-22 RX ADMIN — IPRATROPIUM BROMIDE AND ALBUTEROL SULFATE 3 MILLILITER(S): .5; 3 SOLUTION RESPIRATORY (INHALATION) at 10:28

## 2024-06-22 RX ADMIN — Medication 2 PUFF(S): at 11:16

## 2024-06-22 RX ADMIN — Medication 90 MILLIGRAM(S): at 06:29

## 2024-06-22 RX ADMIN — ALPRAZOLAM 0.25 MILLIGRAM(S): 2 TABLET ORAL at 12:31

## 2024-06-22 RX ADMIN — Medication 400 MILLIGRAM(S): at 06:30

## 2024-06-22 RX ADMIN — OXYCODONE HYDROCHLORIDE 15 MILLIGRAM(S): 100 SOLUTION ORAL at 11:16

## 2024-06-22 RX ADMIN — Medication 400 MILLIGRAM(S): at 12:31

## 2024-06-22 RX ADMIN — ALPRAZOLAM 0.25 MILLIGRAM(S): 2 TABLET ORAL at 22:16

## 2024-06-22 RX ADMIN — Medication 2 TABLET(S): at 22:18

## 2024-06-22 RX ADMIN — OXYCODONE HYDROCHLORIDE 15 MILLIGRAM(S): 100 SOLUTION ORAL at 18:53

## 2024-06-22 RX ADMIN — FUROSEMIDE 20 MILLIGRAM(S): 10 INJECTION, SOLUTION INTRAMUSCULAR; INTRAVENOUS at 06:29

## 2024-06-22 RX ADMIN — Medication 2 PUFF(S): at 22:16

## 2024-06-22 RX ADMIN — Medication 400 MILLIGRAM(S): at 22:16

## 2024-06-22 RX ADMIN — Medication 6 MILLIGRAM(S): at 22:21

## 2024-06-22 RX ADMIN — ATORVASTATIN CALCIUM 40 MILLIGRAM(S): 20 TABLET, FILM COATED ORAL at 22:19

## 2024-06-22 RX ADMIN — Medication 1 APPLICATION(S): at 11:20

## 2024-06-22 RX ADMIN — CLONIDINE HYDROCHLORIDE 0.1 MILLIGRAM(S): 0.3 TABLET ORAL at 06:29

## 2024-06-22 RX ADMIN — OXYCODONE HYDROCHLORIDE 10 MILLIGRAM(S): 100 SOLUTION ORAL at 22:16

## 2024-06-22 RX ADMIN — OXYCODONE HYDROCHLORIDE 15 MILLIGRAM(S): 100 SOLUTION ORAL at 05:29

## 2024-06-23 LAB
ANION GAP SERPL CALC-SCNC: 11 MMOL/L — SIGNIFICANT CHANGE UP (ref 7–14)
BASOPHILS # BLD AUTO: 0.05 K/UL — SIGNIFICANT CHANGE UP (ref 0–0.2)
BASOPHILS NFR BLD AUTO: 0.8 % — SIGNIFICANT CHANGE UP (ref 0–2)
BUN SERPL-MCNC: 20 MG/DL — SIGNIFICANT CHANGE UP (ref 7–23)
CALCIUM SERPL-MCNC: 8.1 MG/DL — LOW (ref 8.4–10.5)
CHLORIDE SERPL-SCNC: 101 MMOL/L — SIGNIFICANT CHANGE UP (ref 98–107)
CO2 SERPL-SCNC: 28 MMOL/L — SIGNIFICANT CHANGE UP (ref 22–31)
CREAT SERPL-MCNC: 0.96 MG/DL — SIGNIFICANT CHANGE UP (ref 0.5–1.3)
EGFR: 61 ML/MIN/1.73M2 — SIGNIFICANT CHANGE UP
EOSINOPHIL # BLD AUTO: 0.19 K/UL — SIGNIFICANT CHANGE UP (ref 0–0.5)
EOSINOPHIL NFR BLD AUTO: 3.2 % — SIGNIFICANT CHANGE UP (ref 0–6)
GLUCOSE SERPL-MCNC: 94 MG/DL — SIGNIFICANT CHANGE UP (ref 70–99)
HCT VFR BLD CALC: 35.7 % — SIGNIFICANT CHANGE UP (ref 34.5–45)
HGB BLD-MCNC: 11.7 G/DL — SIGNIFICANT CHANGE UP (ref 11.5–15.5)
IANC: 4.29 K/UL — SIGNIFICANT CHANGE UP (ref 1.8–7.4)
IMM GRANULOCYTES NFR BLD AUTO: 1 % — HIGH (ref 0–0.9)
LYMPHOCYTES # BLD AUTO: 0.58 K/UL — LOW (ref 1–3.3)
LYMPHOCYTES # BLD AUTO: 9.7 % — LOW (ref 13–44)
MAGNESIUM SERPL-MCNC: 2.1 MG/DL — SIGNIFICANT CHANGE UP (ref 1.6–2.6)
MCHC RBC-ENTMCNC: 32.8 GM/DL — SIGNIFICANT CHANGE UP (ref 32–36)
MCHC RBC-ENTMCNC: 33.2 PG — SIGNIFICANT CHANGE UP (ref 27–34)
MCV RBC AUTO: 101.4 FL — HIGH (ref 80–100)
MONOCYTES # BLD AUTO: 0.78 K/UL — SIGNIFICANT CHANGE UP (ref 0–0.9)
MONOCYTES NFR BLD AUTO: 13.1 % — SIGNIFICANT CHANGE UP (ref 2–14)
NEUTROPHILS # BLD AUTO: 4.29 K/UL — SIGNIFICANT CHANGE UP (ref 1.8–7.4)
NEUTROPHILS NFR BLD AUTO: 72.2 % — SIGNIFICANT CHANGE UP (ref 43–77)
NRBC # BLD: 0 /100 WBCS — SIGNIFICANT CHANGE UP (ref 0–0)
NRBC # FLD: 0 K/UL — SIGNIFICANT CHANGE UP (ref 0–0)
PHOSPHATE SERPL-MCNC: 4.4 MG/DL — SIGNIFICANT CHANGE UP (ref 2.5–4.5)
PLATELET # BLD AUTO: 272 K/UL — SIGNIFICANT CHANGE UP (ref 150–400)
POTASSIUM SERPL-MCNC: 4.1 MMOL/L — SIGNIFICANT CHANGE UP (ref 3.5–5.3)
POTASSIUM SERPL-SCNC: 4.1 MMOL/L — SIGNIFICANT CHANGE UP (ref 3.5–5.3)
RBC # BLD: 3.52 M/UL — LOW (ref 3.8–5.2)
RBC # FLD: 12.8 % — SIGNIFICANT CHANGE UP (ref 10.3–14.5)
SODIUM SERPL-SCNC: 140 MMOL/L — SIGNIFICANT CHANGE UP (ref 135–145)
WBC # BLD: 5.95 K/UL — SIGNIFICANT CHANGE UP (ref 3.8–10.5)
WBC # FLD AUTO: 5.95 K/UL — SIGNIFICANT CHANGE UP (ref 3.8–10.5)

## 2024-06-23 PROCEDURE — 99233 SBSQ HOSP IP/OBS HIGH 50: CPT

## 2024-06-23 RX ORDER — ALPRAZOLAM 2 MG/1
0.25 TABLET ORAL ONCE
Refills: 0 | Status: DISCONTINUED | OUTPATIENT
Start: 2024-06-23 | End: 2024-06-23

## 2024-06-23 RX ORDER — VANCOMYCIN HYDROCHLORIDE 50 MG/ML
1250 KIT ORAL EVERY 24 HOURS
Refills: 0 | Status: DISCONTINUED | OUTPATIENT
Start: 2024-06-24 | End: 2024-06-26

## 2024-06-23 RX ADMIN — OXYCODONE HYDROCHLORIDE 15 MILLIGRAM(S): 100 SOLUTION ORAL at 13:31

## 2024-06-23 RX ADMIN — Medication 400 MILLIGRAM(S): at 13:31

## 2024-06-23 RX ADMIN — RIVAROXABAN 2.5 MILLIGRAM(S): 10 TABLET, FILM COATED ORAL at 17:41

## 2024-06-23 RX ADMIN — ATORVASTATIN CALCIUM 40 MILLIGRAM(S): 20 TABLET, FILM COATED ORAL at 21:42

## 2024-06-23 RX ADMIN — OXYCODONE HYDROCHLORIDE 15 MILLIGRAM(S): 100 SOLUTION ORAL at 18:22

## 2024-06-23 RX ADMIN — FUROSEMIDE 20 MILLIGRAM(S): 10 INJECTION, SOLUTION INTRAMUSCULAR; INTRAVENOUS at 04:54

## 2024-06-23 RX ADMIN — Medication 6 MILLIGRAM(S): at 21:40

## 2024-06-23 RX ADMIN — OXYCODONE HYDROCHLORIDE 15 MILLIGRAM(S): 100 SOLUTION ORAL at 22:46

## 2024-06-23 RX ADMIN — OXYCODONE HYDROCHLORIDE 15 MILLIGRAM(S): 100 SOLUTION ORAL at 04:53

## 2024-06-23 RX ADMIN — CLONIDINE HYDROCHLORIDE 0.1 MILLIGRAM(S): 0.3 TABLET ORAL at 04:53

## 2024-06-23 RX ADMIN — Medication 2 PUFF(S): at 08:58

## 2024-06-23 RX ADMIN — Medication 1 APPLICATION(S): at 11:41

## 2024-06-23 RX ADMIN — Medication 2 PUFF(S): at 21:43

## 2024-06-23 RX ADMIN — OXYCODONE HYDROCHLORIDE 15 MILLIGRAM(S): 100 SOLUTION ORAL at 14:30

## 2024-06-23 RX ADMIN — OXYCODONE HYDROCHLORIDE 15 MILLIGRAM(S): 100 SOLUTION ORAL at 10:00

## 2024-06-23 RX ADMIN — ALPRAZOLAM 0.25 MILLIGRAM(S): 2 TABLET ORAL at 11:40

## 2024-06-23 RX ADMIN — OXYCODONE HYDROCHLORIDE 15 MILLIGRAM(S): 100 SOLUTION ORAL at 17:40

## 2024-06-23 RX ADMIN — Medication 2 TABLET(S): at 21:42

## 2024-06-23 RX ADMIN — CLONIDINE HYDROCHLORIDE 0.1 MILLIGRAM(S): 0.3 TABLET ORAL at 17:40

## 2024-06-23 RX ADMIN — Medication 400 MILLIGRAM(S): at 21:41

## 2024-06-23 RX ADMIN — Medication 400 MILLIGRAM(S): at 04:54

## 2024-06-23 RX ADMIN — VANCOMYCIN HYDROCHLORIDE 250 MILLIGRAM(S): KIT at 00:36

## 2024-06-23 RX ADMIN — RIVAROXABAN 2.5 MILLIGRAM(S): 10 TABLET, FILM COATED ORAL at 04:54

## 2024-06-23 RX ADMIN — Medication 90 MILLIGRAM(S): at 04:54

## 2024-06-23 RX ADMIN — ALPRAZOLAM 0.25 MILLIGRAM(S): 2 TABLET ORAL at 21:40

## 2024-06-23 RX ADMIN — OXYCODONE HYDROCHLORIDE 15 MILLIGRAM(S): 100 SOLUTION ORAL at 08:57

## 2024-06-24 LAB
ANION GAP SERPL CALC-SCNC: 8 MMOL/L — SIGNIFICANT CHANGE UP (ref 7–14)
BASOPHILS # BLD AUTO: 0.05 K/UL — SIGNIFICANT CHANGE UP (ref 0–0.2)
BASOPHILS NFR BLD AUTO: 0.8 % — SIGNIFICANT CHANGE UP (ref 0–2)
BUN SERPL-MCNC: 19 MG/DL — SIGNIFICANT CHANGE UP (ref 7–23)
CALCIUM SERPL-MCNC: 8.2 MG/DL — LOW (ref 8.4–10.5)
CHLORIDE SERPL-SCNC: 100 MMOL/L — SIGNIFICANT CHANGE UP (ref 98–107)
CO2 SERPL-SCNC: 29 MMOL/L — SIGNIFICANT CHANGE UP (ref 22–31)
CREAT SERPL-MCNC: 0.91 MG/DL — SIGNIFICANT CHANGE UP (ref 0.5–1.3)
EGFR: 65 ML/MIN/1.73M2 — SIGNIFICANT CHANGE UP
EOSINOPHIL # BLD AUTO: 0.18 K/UL — SIGNIFICANT CHANGE UP (ref 0–0.5)
EOSINOPHIL NFR BLD AUTO: 2.8 % — SIGNIFICANT CHANGE UP (ref 0–6)
GLUCOSE SERPL-MCNC: 105 MG/DL — HIGH (ref 70–99)
HCT VFR BLD CALC: 36 % — SIGNIFICANT CHANGE UP (ref 34.5–45)
HGB BLD-MCNC: 11.3 G/DL — LOW (ref 11.5–15.5)
IANC: 4.94 K/UL — SIGNIFICANT CHANGE UP (ref 1.8–7.4)
IMM GRANULOCYTES NFR BLD AUTO: 0.3 % — SIGNIFICANT CHANGE UP (ref 0–0.9)
LYMPHOCYTES # BLD AUTO: 0.59 K/UL — LOW (ref 1–3.3)
LYMPHOCYTES # BLD AUTO: 9 % — LOW (ref 13–44)
MAGNESIUM SERPL-MCNC: 2 MG/DL — SIGNIFICANT CHANGE UP (ref 1.6–2.6)
MCHC RBC-ENTMCNC: 31.4 GM/DL — LOW (ref 32–36)
MCHC RBC-ENTMCNC: 32.2 PG — SIGNIFICANT CHANGE UP (ref 27–34)
MCV RBC AUTO: 102.6 FL — HIGH (ref 80–100)
MONOCYTES # BLD AUTO: 0.74 K/UL — SIGNIFICANT CHANGE UP (ref 0–0.9)
MONOCYTES NFR BLD AUTO: 11.3 % — SIGNIFICANT CHANGE UP (ref 2–14)
NEUTROPHILS # BLD AUTO: 4.94 K/UL — SIGNIFICANT CHANGE UP (ref 1.8–7.4)
NEUTROPHILS NFR BLD AUTO: 75.8 % — SIGNIFICANT CHANGE UP (ref 43–77)
NRBC # BLD: 0 /100 WBCS — SIGNIFICANT CHANGE UP (ref 0–0)
NRBC # FLD: 0 K/UL — SIGNIFICANT CHANGE UP (ref 0–0)
PHOSPHATE SERPL-MCNC: 4.3 MG/DL — SIGNIFICANT CHANGE UP (ref 2.5–4.5)
PLATELET # BLD AUTO: 311 K/UL — SIGNIFICANT CHANGE UP (ref 150–400)
POTASSIUM SERPL-MCNC: 4.1 MMOL/L — SIGNIFICANT CHANGE UP (ref 3.5–5.3)
POTASSIUM SERPL-SCNC: 4.1 MMOL/L — SIGNIFICANT CHANGE UP (ref 3.5–5.3)
RBC # BLD: 3.51 M/UL — LOW (ref 3.8–5.2)
RBC # FLD: 12.6 % — SIGNIFICANT CHANGE UP (ref 10.3–14.5)
SODIUM SERPL-SCNC: 137 MMOL/L — SIGNIFICANT CHANGE UP (ref 135–145)
WBC # BLD: 6.52 K/UL — SIGNIFICANT CHANGE UP (ref 3.8–10.5)
WBC # FLD AUTO: 6.52 K/UL — SIGNIFICANT CHANGE UP (ref 3.8–10.5)

## 2024-06-24 PROCEDURE — 99232 SBSQ HOSP IP/OBS MODERATE 35: CPT

## 2024-06-24 RX ORDER — HYDROXYZINE PAMOATE 50 MG/1
25 CAPSULE ORAL ONCE
Refills: 0 | Status: COMPLETED | OUTPATIENT
Start: 2024-06-24 | End: 2024-06-25

## 2024-06-24 RX ADMIN — Medication 1 TABLET(S): at 12:05

## 2024-06-24 RX ADMIN — RIVAROXABAN 2.5 MILLIGRAM(S): 10 TABLET, FILM COATED ORAL at 18:28

## 2024-06-24 RX ADMIN — Medication 2 PUFF(S): at 08:58

## 2024-06-24 RX ADMIN — Medication 1 APPLICATION(S): at 12:06

## 2024-06-24 RX ADMIN — Medication 400 MILLIGRAM(S): at 05:26

## 2024-06-24 RX ADMIN — CLONIDINE HYDROCHLORIDE 0.1 MILLIGRAM(S): 0.3 TABLET ORAL at 05:26

## 2024-06-24 RX ADMIN — IPRATROPIUM BROMIDE AND ALBUTEROL SULFATE 3 MILLILITER(S): .5; 3 SOLUTION RESPIRATORY (INHALATION) at 14:55

## 2024-06-24 RX ADMIN — OXYCODONE HYDROCHLORIDE 15 MILLIGRAM(S): 100 SOLUTION ORAL at 13:47

## 2024-06-24 RX ADMIN — Medication 2 PUFF(S): at 21:07

## 2024-06-24 RX ADMIN — OXYCODONE HYDROCHLORIDE 15 MILLIGRAM(S): 100 SOLUTION ORAL at 06:28

## 2024-06-24 RX ADMIN — VANCOMYCIN HYDROCHLORIDE 166.67 MILLIGRAM(S): KIT at 00:47

## 2024-06-24 RX ADMIN — OXYCODONE HYDROCHLORIDE 15 MILLIGRAM(S): 100 SOLUTION ORAL at 14:15

## 2024-06-24 RX ADMIN — Medication 90 MILLIGRAM(S): at 05:27

## 2024-06-24 RX ADMIN — OXYCODONE HYDROCHLORIDE 15 MILLIGRAM(S): 100 SOLUTION ORAL at 18:28

## 2024-06-24 RX ADMIN — ASPIRIN 81 MILLIGRAM(S): 325 TABLET, FILM COATED ORAL at 12:05

## 2024-06-24 RX ADMIN — IPRATROPIUM BROMIDE AND ALBUTEROL SULFATE 3 MILLILITER(S): .5; 3 SOLUTION RESPIRATORY (INHALATION) at 09:50

## 2024-06-24 RX ADMIN — RIVAROXABAN 2.5 MILLIGRAM(S): 10 TABLET, FILM COATED ORAL at 05:27

## 2024-06-24 RX ADMIN — CLONIDINE HYDROCHLORIDE 0.1 MILLIGRAM(S): 0.3 TABLET ORAL at 18:28

## 2024-06-24 RX ADMIN — OXYCODONE HYDROCHLORIDE 15 MILLIGRAM(S): 100 SOLUTION ORAL at 05:28

## 2024-06-24 RX ADMIN — Medication 400 MILLIGRAM(S): at 21:06

## 2024-06-24 RX ADMIN — FUROSEMIDE 20 MILLIGRAM(S): 10 INJECTION, SOLUTION INTRAMUSCULAR; INTRAVENOUS at 05:26

## 2024-06-24 RX ADMIN — TIOTROPIUM BROMIDE 2 PUFF(S): 18 CAPSULE ORAL; RESPIRATORY (INHALATION) at 12:04

## 2024-06-24 RX ADMIN — ATORVASTATIN CALCIUM 40 MILLIGRAM(S): 20 TABLET, FILM COATED ORAL at 21:07

## 2024-06-25 LAB
ANION GAP SERPL CALC-SCNC: 15 MMOL/L — HIGH (ref 7–14)
BUN SERPL-MCNC: 19 MG/DL — SIGNIFICANT CHANGE UP (ref 7–23)
CALCIUM SERPL-MCNC: 8.2 MG/DL — LOW (ref 8.4–10.5)
CHLORIDE SERPL-SCNC: 99 MMOL/L — SIGNIFICANT CHANGE UP (ref 98–107)
CO2 SERPL-SCNC: 21 MMOL/L — LOW (ref 22–31)
CREAT SERPL-MCNC: 0.88 MG/DL — SIGNIFICANT CHANGE UP (ref 0.5–1.3)
CULTURE RESULTS: ABNORMAL
EGFR: 68 ML/MIN/1.73M2 — SIGNIFICANT CHANGE UP
GLUCOSE SERPL-MCNC: 105 MG/DL — HIGH (ref 70–99)
MAGNESIUM SERPL-MCNC: 2 MG/DL — SIGNIFICANT CHANGE UP (ref 1.6–2.6)
ORGANISM # SPEC MICROSCOPIC CNT: ABNORMAL
ORGANISM # SPEC MICROSCOPIC CNT: ABNORMAL
PHOSPHATE SERPL-MCNC: 3.6 MG/DL — SIGNIFICANT CHANGE UP (ref 2.5–4.5)
POTASSIUM SERPL-MCNC: 4.6 MMOL/L — SIGNIFICANT CHANGE UP (ref 3.5–5.3)
POTASSIUM SERPL-SCNC: 4.6 MMOL/L — SIGNIFICANT CHANGE UP (ref 3.5–5.3)
SODIUM SERPL-SCNC: 135 MMOL/L — SIGNIFICANT CHANGE UP (ref 135–145)
SPECIMEN SOURCE: SIGNIFICANT CHANGE UP

## 2024-06-25 PROCEDURE — 71045 X-RAY EXAM CHEST 1 VIEW: CPT | Mod: 26

## 2024-06-25 PROCEDURE — 99232 SBSQ HOSP IP/OBS MODERATE 35: CPT

## 2024-06-25 RX ORDER — OXYCODONE HYDROCHLORIDE 100 MG/5ML
15 SOLUTION ORAL EVERY 4 HOURS
Refills: 0 | Status: DISCONTINUED | OUTPATIENT
Start: 2024-06-25 | End: 2024-06-26

## 2024-06-25 RX ORDER — FUROSEMIDE 10 MG/ML
20 INJECTION, SOLUTION INTRAMUSCULAR; INTRAVENOUS ONCE
Refills: 0 | Status: COMPLETED | OUTPATIENT
Start: 2024-06-25 | End: 2024-06-25

## 2024-06-25 RX ORDER — OXYCODONE HYDROCHLORIDE 100 MG/5ML
10 SOLUTION ORAL EVERY 4 HOURS
Refills: 0 | Status: DISCONTINUED | OUTPATIENT
Start: 2024-06-25 | End: 2024-06-26

## 2024-06-25 RX ADMIN — Medication 1 TABLET(S): at 11:20

## 2024-06-25 RX ADMIN — TIOTROPIUM BROMIDE 2 PUFF(S): 18 CAPSULE ORAL; RESPIRATORY (INHALATION) at 11:20

## 2024-06-25 RX ADMIN — VANCOMYCIN HYDROCHLORIDE 166.67 MILLIGRAM(S): KIT at 00:06

## 2024-06-25 RX ADMIN — OXYCODONE HYDROCHLORIDE 15 MILLIGRAM(S): 100 SOLUTION ORAL at 08:35

## 2024-06-25 RX ADMIN — CLONIDINE HYDROCHLORIDE 0.1 MILLIGRAM(S): 0.3 TABLET ORAL at 05:41

## 2024-06-25 RX ADMIN — HYDROXYZINE PAMOATE 25 MILLIGRAM(S): 50 CAPSULE ORAL at 04:12

## 2024-06-25 RX ADMIN — CLONIDINE HYDROCHLORIDE 0.1 MILLIGRAM(S): 0.3 TABLET ORAL at 18:20

## 2024-06-25 RX ADMIN — Medication 6 MILLIGRAM(S): at 22:00

## 2024-06-25 RX ADMIN — ASPIRIN 81 MILLIGRAM(S): 325 TABLET, FILM COATED ORAL at 11:20

## 2024-06-25 RX ADMIN — Medication 400 MILLIGRAM(S): at 11:23

## 2024-06-25 RX ADMIN — Medication 400 MILLIGRAM(S): at 05:41

## 2024-06-25 RX ADMIN — Medication 400 MILLIGRAM(S): at 22:00

## 2024-06-25 RX ADMIN — FUROSEMIDE 20 MILLIGRAM(S): 10 INJECTION, SOLUTION INTRAMUSCULAR; INTRAVENOUS at 16:41

## 2024-06-25 RX ADMIN — FUROSEMIDE 20 MILLIGRAM(S): 10 INJECTION, SOLUTION INTRAMUSCULAR; INTRAVENOUS at 05:42

## 2024-06-25 RX ADMIN — RIVAROXABAN 2.5 MILLIGRAM(S): 10 TABLET, FILM COATED ORAL at 05:41

## 2024-06-25 RX ADMIN — OXYCODONE HYDROCHLORIDE 15 MILLIGRAM(S): 100 SOLUTION ORAL at 23:00

## 2024-06-25 RX ADMIN — Medication 1 APPLICATION(S): at 11:20

## 2024-06-25 RX ADMIN — Medication 2 PUFF(S): at 11:19

## 2024-06-25 RX ADMIN — Medication 2 PUFF(S): at 22:01

## 2024-06-25 RX ADMIN — OXYCODONE HYDROCHLORIDE 15 MILLIGRAM(S): 100 SOLUTION ORAL at 22:00

## 2024-06-25 RX ADMIN — IPRATROPIUM BROMIDE AND ALBUTEROL SULFATE 3 MILLILITER(S): .5; 3 SOLUTION RESPIRATORY (INHALATION) at 14:15

## 2024-06-25 RX ADMIN — OXYCODONE HYDROCHLORIDE 15 MILLIGRAM(S): 100 SOLUTION ORAL at 02:11

## 2024-06-25 RX ADMIN — Medication 90 MILLIGRAM(S): at 05:42

## 2024-06-25 RX ADMIN — OXYCODONE HYDROCHLORIDE 15 MILLIGRAM(S): 100 SOLUTION ORAL at 01:11

## 2024-06-25 RX ADMIN — OXYCODONE HYDROCHLORIDE 15 MILLIGRAM(S): 100 SOLUTION ORAL at 09:35

## 2024-06-25 RX ADMIN — RIVAROXABAN 2.5 MILLIGRAM(S): 10 TABLET, FILM COATED ORAL at 18:20

## 2024-06-25 RX ADMIN — OXYCODONE HYDROCHLORIDE 15 MILLIGRAM(S): 100 SOLUTION ORAL at 16:40

## 2024-06-26 ENCOUNTER — TRANSCRIPTION ENCOUNTER (OUTPATIENT)
Age: 79
End: 2024-06-26

## 2024-06-26 ENCOUNTER — EMERGENCY (EMERGENCY)
Facility: HOSPITAL | Age: 79
LOS: 1 days | Discharge: NURSING, REHAB OR LONG-TERM CARE | End: 2024-06-26
Attending: STUDENT IN AN ORGANIZED HEALTH CARE EDUCATION/TRAINING PROGRAM | Admitting: STUDENT IN AN ORGANIZED HEALTH CARE EDUCATION/TRAINING PROGRAM
Payer: MEDICARE

## 2024-06-26 VITALS
RESPIRATION RATE: 17 BRPM | TEMPERATURE: 98 F | DIASTOLIC BLOOD PRESSURE: 81 MMHG | OXYGEN SATURATION: 100 % | HEART RATE: 54 BPM | SYSTOLIC BLOOD PRESSURE: 145 MMHG

## 2024-06-26 VITALS
HEART RATE: 63 BPM | OXYGEN SATURATION: 95 % | DIASTOLIC BLOOD PRESSURE: 55 MMHG | RESPIRATION RATE: 20 BRPM | SYSTOLIC BLOOD PRESSURE: 120 MMHG | HEIGHT: 62 IN | WEIGHT: 125 LBS | TEMPERATURE: 98 F

## 2024-06-26 DIAGNOSIS — Z98.890 OTHER SPECIFIED POSTPROCEDURAL STATES: Chronic | ICD-10-CM

## 2024-06-26 DIAGNOSIS — Z98.89 OTHER SPECIFIED POSTPROCEDURAL STATES: Chronic | ICD-10-CM

## 2024-06-26 DIAGNOSIS — Z96.641 PRESENCE OF RIGHT ARTIFICIAL HIP JOINT: Chronic | ICD-10-CM

## 2024-06-26 LAB
ANION GAP SERPL CALC-SCNC: 15 MMOL/L — HIGH (ref 7–14)
BASOPHILS # BLD AUTO: 0.07 K/UL — SIGNIFICANT CHANGE UP (ref 0–0.2)
BASOPHILS NFR BLD AUTO: 1.3 % — SIGNIFICANT CHANGE UP (ref 0–2)
BUN SERPL-MCNC: 24 MG/DL — HIGH (ref 7–23)
CALCIUM SERPL-MCNC: 8.3 MG/DL — LOW (ref 8.4–10.5)
CHLORIDE SERPL-SCNC: 99 MMOL/L — SIGNIFICANT CHANGE UP (ref 98–107)
CO2 SERPL-SCNC: 24 MMOL/L — SIGNIFICANT CHANGE UP (ref 22–31)
CREAT SERPL-MCNC: 0.9 MG/DL — SIGNIFICANT CHANGE UP (ref 0.5–1.3)
EGFR: 66 ML/MIN/1.73M2 — SIGNIFICANT CHANGE UP
EOSINOPHIL # BLD AUTO: 0.19 K/UL — SIGNIFICANT CHANGE UP (ref 0–0.5)
EOSINOPHIL NFR BLD AUTO: 3.5 % — SIGNIFICANT CHANGE UP (ref 0–6)
GLUCOSE SERPL-MCNC: 116 MG/DL — HIGH (ref 70–99)
HCT VFR BLD CALC: 36.7 % — SIGNIFICANT CHANGE UP (ref 34.5–45)
HGB BLD-MCNC: 11.7 G/DL — SIGNIFICANT CHANGE UP (ref 11.5–15.5)
IANC: 3.77 K/UL — SIGNIFICANT CHANGE UP (ref 1.8–7.4)
IMM GRANULOCYTES NFR BLD AUTO: 0.4 % — SIGNIFICANT CHANGE UP (ref 0–0.9)
LYMPHOCYTES # BLD AUTO: 0.66 K/UL — LOW (ref 1–3.3)
LYMPHOCYTES # BLD AUTO: 12.1 % — LOW (ref 13–44)
MAGNESIUM SERPL-MCNC: 1.9 MG/DL — SIGNIFICANT CHANGE UP (ref 1.6–2.6)
MCHC RBC-ENTMCNC: 31.9 GM/DL — LOW (ref 32–36)
MCHC RBC-ENTMCNC: 32 PG — SIGNIFICANT CHANGE UP (ref 27–34)
MCV RBC AUTO: 100.3 FL — HIGH (ref 80–100)
MONOCYTES # BLD AUTO: 0.74 K/UL — SIGNIFICANT CHANGE UP (ref 0–0.9)
MONOCYTES NFR BLD AUTO: 13.6 % — SIGNIFICANT CHANGE UP (ref 2–14)
NEUTROPHILS # BLD AUTO: 3.77 K/UL — SIGNIFICANT CHANGE UP (ref 1.8–7.4)
NEUTROPHILS NFR BLD AUTO: 69.1 % — SIGNIFICANT CHANGE UP (ref 43–77)
NRBC # BLD: 0 /100 WBCS — SIGNIFICANT CHANGE UP (ref 0–0)
NRBC # FLD: 0 K/UL — SIGNIFICANT CHANGE UP (ref 0–0)
PHOSPHATE SERPL-MCNC: 4.3 MG/DL — SIGNIFICANT CHANGE UP (ref 2.5–4.5)
PLATELET # BLD AUTO: 296 K/UL — SIGNIFICANT CHANGE UP (ref 150–400)
POTASSIUM SERPL-MCNC: 4.5 MMOL/L — SIGNIFICANT CHANGE UP (ref 3.5–5.3)
POTASSIUM SERPL-SCNC: 4.5 MMOL/L — SIGNIFICANT CHANGE UP (ref 3.5–5.3)
RBC # BLD: 3.66 M/UL — LOW (ref 3.8–5.2)
RBC # FLD: 12.5 % — SIGNIFICANT CHANGE UP (ref 10.3–14.5)
SODIUM SERPL-SCNC: 138 MMOL/L — SIGNIFICANT CHANGE UP (ref 135–145)
VANCOMYCIN TROUGH SERPL-MCNC: 25.5 UG/ML — CRITICAL HIGH (ref 10–20)
VANCOMYCIN TROUGH SERPL-MCNC: 51.9 UG/ML — CRITICAL HIGH (ref 10–20)
WBC # BLD: 5.45 K/UL — SIGNIFICANT CHANGE UP (ref 3.8–10.5)
WBC # FLD AUTO: 5.45 K/UL — SIGNIFICANT CHANGE UP (ref 3.8–10.5)

## 2024-06-26 PROCEDURE — 99232 SBSQ HOSP IP/OBS MODERATE 35: CPT

## 2024-06-26 PROCEDURE — 99223 1ST HOSP IP/OBS HIGH 75: CPT

## 2024-06-26 RX ORDER — VANCOMYCIN HCL IN 5 % DEXTROSE 1.5G/250ML
1000 PLASTIC BAG, INJECTION (ML) INTRAVENOUS EVERY 24 HOURS
Refills: 0 | Status: DISCONTINUED | OUTPATIENT
Start: 2024-06-27 | End: 2024-06-30

## 2024-06-26 RX ORDER — OXYCODONE HYDROCHLORIDE 100 MG/5ML
1 SOLUTION ORAL
Qty: 0 | Refills: 0 | DISCHARGE
Start: 2024-06-26

## 2024-06-26 RX ORDER — ASPIRIN 325 MG/1
1 TABLET, FILM COATED ORAL
Qty: 0 | Refills: 0 | DISCHARGE
Start: 2024-06-26

## 2024-06-26 RX ORDER — ACETAMINOPHEN 500 MG/5ML
975 LIQUID (ML) ORAL EVERY 6 HOURS
Refills: 0 | Status: DISCONTINUED | OUTPATIENT
Start: 2024-06-26 | End: 2024-06-30

## 2024-06-26 RX ORDER — SENNOSIDES 8.6 MG
2 TABLET ORAL
Qty: 0 | Refills: 0 | DISCHARGE
Start: 2024-06-26

## 2024-06-26 RX ORDER — OXYCODONE HYDROCHLORIDE 30 MG/1
10 TABLET ORAL EVERY 4 HOURS
Refills: 0 | Status: DISCONTINUED | OUTPATIENT
Start: 2024-06-26 | End: 2024-06-27

## 2024-06-26 RX ORDER — ACETAMINOPHEN 500 MG/5ML
975 LIQUID (ML) ORAL ONCE
Refills: 0 | Status: DISCONTINUED | OUTPATIENT
Start: 2024-06-26 | End: 2024-06-26

## 2024-06-26 RX ORDER — VANCOMYCIN HYDROCHLORIDE 50 MG/ML
1000 KIT ORAL EVERY 24 HOURS
Refills: 0 | Status: DISCONTINUED | OUTPATIENT
Start: 2024-06-26 | End: 2024-06-26

## 2024-06-26 RX ORDER — VANCOMYCIN HYDROCHLORIDE 50 MG/ML
1 KIT ORAL
Qty: 0 | Refills: 0 | DISCHARGE
Start: 2024-06-26

## 2024-06-26 RX ORDER — TIOTROPIUM BROMIDE 18 UG/1
2 CAPSULE ORAL; RESPIRATORY (INHALATION)
Qty: 0 | Refills: 0 | DISCHARGE
Start: 2024-06-26

## 2024-06-26 RX ORDER — RIVAROXABAN 10 MG/1
2.5 TABLET, FILM COATED ORAL
Refills: 0 | Status: DISCONTINUED | OUTPATIENT
Start: 2024-06-26 | End: 2024-06-30

## 2024-06-26 RX ORDER — FUROSEMIDE 10 MG/ML
20 INJECTION INTRAMUSCULAR; INTRAVENOUS DAILY
Refills: 0 | Status: DISCONTINUED | OUTPATIENT
Start: 2024-06-27 | End: 2024-06-30

## 2024-06-26 RX ORDER — ACETAMINOPHEN 325 MG
2 TABLET ORAL
Qty: 0 | Refills: 0 | DISCHARGE
Start: 2024-06-26

## 2024-06-26 RX ORDER — POLYETHYLENE GLYCOL 3350 17 G/17G
17 POWDER, FOR SOLUTION ORAL DAILY
Refills: 0 | Status: DISCONTINUED | OUTPATIENT
Start: 2024-06-27 | End: 2024-06-30

## 2024-06-26 RX ORDER — IPRATROPIUM BROMIDE AND ALBUTEROL SULFATE .5; 2.5 MG/3ML; MG/3ML
3 SOLUTION RESPIRATORY (INHALATION) EVERY 6 HOURS
Refills: 0 | Status: DISCONTINUED | OUTPATIENT
Start: 2024-06-26 | End: 2024-06-30

## 2024-06-26 RX ORDER — TIOTROPIUM BROMIDE INHALATION SPRAY 3.12 UG/1
2 SPRAY, METERED RESPIRATORY (INHALATION) DAILY
Refills: 0 | Status: DISCONTINUED | OUTPATIENT
Start: 2024-06-26 | End: 2024-06-30

## 2024-06-26 RX ORDER — GABAPENTIN 400 MG/1
400 CAPSULE ORAL EVERY 8 HOURS
Refills: 0 | Status: DISCONTINUED | OUTPATIENT
Start: 2024-06-26 | End: 2024-06-30

## 2024-06-26 RX ORDER — DOXYCYCLINE 100 MG/1
1 CAPSULE ORAL
Qty: 60 | Refills: 0
Start: 2024-06-26 | End: 2024-07-25

## 2024-06-26 RX ORDER — OXYCODONE HYDROCHLORIDE 30 MG/1
10 TABLET ORAL ONCE
Refills: 0 | Status: DISCONTINUED | OUTPATIENT
Start: 2024-06-26 | End: 2024-06-26

## 2024-06-26 RX ORDER — ATORVASTATIN CALCIUM 20 MG/1
1 TABLET, FILM COATED ORAL
Qty: 0 | Refills: 0 | DISCHARGE
Start: 2024-06-26

## 2024-06-26 RX ORDER — BUDESONIDE/FORMOTEROL FUMARATE 160-4.5MCG
2 HFA AEROSOL WITH ADAPTER (GRAM) INHALATION
Qty: 0 | Refills: 0 | DISCHARGE
Start: 2024-06-26

## 2024-06-26 RX ORDER — FUROSEMIDE 10 MG/ML
1 INJECTION, SOLUTION INTRAMUSCULAR; INTRAVENOUS
Qty: 0 | Refills: 0 | DISCHARGE
Start: 2024-06-26

## 2024-06-26 RX ORDER — ASPIRIN 325 MG
81 TABLET ORAL DAILY
Refills: 0 | Status: DISCONTINUED | OUTPATIENT
Start: 2024-06-27 | End: 2024-06-30

## 2024-06-26 RX ORDER — RIVAROXABAN 10 MG/1
1 TABLET, FILM COATED ORAL
Qty: 0 | Refills: 0 | DISCHARGE
Start: 2024-06-26

## 2024-06-26 RX ORDER — POLYETHYLENE GLYCOL 3350 1 G/G
17 POWDER ORAL
Qty: 0 | Refills: 0 | DISCHARGE
Start: 2024-06-26

## 2024-06-26 RX ADMIN — IPRATROPIUM BROMIDE AND ALBUTEROL SULFATE 3 MILLILITER(S): .5; 3 SOLUTION RESPIRATORY (INHALATION) at 09:20

## 2024-06-26 RX ADMIN — Medication 400 MILLIGRAM(S): at 14:44

## 2024-06-26 RX ADMIN — OXYCODONE HYDROCHLORIDE 15 MILLIGRAM(S): 100 SOLUTION ORAL at 07:20

## 2024-06-26 RX ADMIN — GABAPENTIN 400 MILLIGRAM(S): 400 CAPSULE ORAL at 21:55

## 2024-06-26 RX ADMIN — OXYCODONE HYDROCHLORIDE 10 MILLIGRAM(S): 100 SOLUTION ORAL at 14:45

## 2024-06-26 RX ADMIN — OXYCODONE HYDROCHLORIDE 15 MILLIGRAM(S): 100 SOLUTION ORAL at 06:20

## 2024-06-26 RX ADMIN — OXYCODONE HYDROCHLORIDE 15 MILLIGRAM(S): 100 SOLUTION ORAL at 02:17

## 2024-06-26 RX ADMIN — OXYCODONE HYDROCHLORIDE 10 MILLIGRAM(S): 100 SOLUTION ORAL at 10:34

## 2024-06-26 RX ADMIN — OXYCODONE HYDROCHLORIDE 15 MILLIGRAM(S): 100 SOLUTION ORAL at 03:17

## 2024-06-26 RX ADMIN — Medication 0.1 MILLIGRAM(S): at 21:54

## 2024-06-26 RX ADMIN — Medication 90 MILLIGRAM(S): at 06:20

## 2024-06-26 RX ADMIN — Medication 400 MILLIGRAM(S): at 06:19

## 2024-06-26 RX ADMIN — Medication 975 MILLIGRAM(S): at 22:31

## 2024-06-26 RX ADMIN — RIVAROXABAN 2.5 MILLIGRAM(S): 10 TABLET, FILM COATED ORAL at 06:19

## 2024-06-26 RX ADMIN — OXYCODONE HYDROCHLORIDE 10 MILLIGRAM(S): 30 TABLET ORAL at 19:58

## 2024-06-26 RX ADMIN — Medication 2 PUFF(S): at 14:45

## 2024-06-26 RX ADMIN — CLONIDINE HYDROCHLORIDE 0.1 MILLIGRAM(S): 0.3 TABLET ORAL at 06:19

## 2024-06-26 RX ADMIN — ASPIRIN 81 MILLIGRAM(S): 325 TABLET, FILM COATED ORAL at 14:46

## 2024-06-26 RX ADMIN — VANCOMYCIN HYDROCHLORIDE 250 MILLIGRAM(S): KIT at 06:20

## 2024-06-26 RX ADMIN — Medication 1 TABLET(S): at 14:46

## 2024-06-26 RX ADMIN — FUROSEMIDE 20 MILLIGRAM(S): 10 INJECTION, SOLUTION INTRAMUSCULAR; INTRAVENOUS at 06:19

## 2024-06-26 RX ADMIN — TIOTROPIUM BROMIDE 2 PUFF(S): 18 CAPSULE ORAL; RESPIRATORY (INHALATION) at 14:46

## 2024-06-26 RX ADMIN — Medication 1 APPLICATION(S): at 14:47

## 2024-06-26 RX ADMIN — OXYCODONE HYDROCHLORIDE 10 MILLIGRAM(S): 100 SOLUTION ORAL at 11:34

## 2024-06-27 VITALS
OXYGEN SATURATION: 96 % | HEART RATE: 58 BPM | RESPIRATION RATE: 18 BRPM | TEMPERATURE: 98 F | DIASTOLIC BLOOD PRESSURE: 61 MMHG | SYSTOLIC BLOOD PRESSURE: 150 MMHG

## 2024-06-27 LAB — VANCOMYCIN FLD-MCNC: 27.7 UG/ML

## 2024-06-27 PROCEDURE — 99239 HOSP IP/OBS DSCHRG MGMT >30: CPT

## 2024-06-27 RX ORDER — NICOTINE POLACRILEX 4 MG/1
2 GUM, CHEWING ORAL EVERY 6 HOURS
Refills: 0 | Status: DISCONTINUED | OUTPATIENT
Start: 2024-06-27 | End: 2024-06-30

## 2024-06-27 RX ADMIN — OXYCODONE HYDROCHLORIDE 10 MILLIGRAM(S): 30 TABLET ORAL at 07:20

## 2024-06-27 RX ADMIN — OXYCODONE HYDROCHLORIDE 10 MILLIGRAM(S): 30 TABLET ORAL at 11:28

## 2024-06-27 RX ADMIN — OXYCODONE HYDROCHLORIDE 10 MILLIGRAM(S): 30 TABLET ORAL at 04:30

## 2024-06-27 RX ADMIN — GABAPENTIN 400 MILLIGRAM(S): 400 CAPSULE ORAL at 13:58

## 2024-06-27 RX ADMIN — Medication 250 MILLIGRAM(S): at 06:22

## 2024-06-27 RX ADMIN — GABAPENTIN 400 MILLIGRAM(S): 400 CAPSULE ORAL at 06:24

## 2024-06-27 RX ADMIN — OXYCODONE HYDROCHLORIDE 10 MILLIGRAM(S): 30 TABLET ORAL at 15:54

## 2024-06-27 RX ADMIN — Medication 0.1 MILLIGRAM(S): at 06:24

## 2024-06-27 RX ADMIN — OXYCODONE HYDROCHLORIDE 10 MILLIGRAM(S): 30 TABLET ORAL at 03:17

## 2024-06-27 RX ADMIN — Medication 81 MILLIGRAM(S): at 13:58

## 2024-06-27 RX ADMIN — Medication 975 MILLIGRAM(S): at 13:58

## 2024-06-27 RX ADMIN — Medication 975 MILLIGRAM(S): at 00:00

## 2024-06-27 RX ADMIN — Medication 975 MILLIGRAM(S): at 15:00

## 2024-07-01 ENCOUNTER — NON-APPOINTMENT (OUTPATIENT)
Age: 79
End: 2024-07-01

## 2024-07-10 LAB
CULTURE RESULTS: SIGNIFICANT CHANGE UP
SPECIMEN SOURCE: SIGNIFICANT CHANGE UP

## 2024-07-18 ENCOUNTER — APPOINTMENT (OUTPATIENT)
Dept: INFECTIOUS DISEASE | Facility: CLINIC | Age: 79
End: 2024-07-18

## 2024-09-17 NOTE — PHYSICAL THERAPY INITIAL EVALUATION ADULT - GAIT DEVIATIONS NOTED, PT EVAL
Yes
decreased jaylene/decreased step length/decreased velocity of limb motion/decreased weight-shifting ability

## 2024-10-07 NOTE — ED ADULT NURSE NOTE - NS ED NURSE ELOPE DATE TIME
For caregiver support, please contact    [facility]  Phone: [phone]  Email: [email or web address]    [address], [address]  [place]  [address]   15-Jul-2019 10:40

## 2025-01-27 ENCOUNTER — INPATIENT (INPATIENT)
Facility: HOSPITAL | Age: 80
LOS: 20 days | Discharge: NURSING, REHAB OR LONG-TERM CARE | DRG: 555 | End: 2025-02-17
Attending: GENERAL PRACTICE | Admitting: STUDENT IN AN ORGANIZED HEALTH CARE EDUCATION/TRAINING PROGRAM
Payer: MEDICARE

## 2025-01-27 VITALS
HEART RATE: 77 BPM | DIASTOLIC BLOOD PRESSURE: 74 MMHG | WEIGHT: 139.99 LBS | HEIGHT: 61 IN | TEMPERATURE: 98 F | SYSTOLIC BLOOD PRESSURE: 194 MMHG | OXYGEN SATURATION: 95 % | RESPIRATION RATE: 16 BRPM

## 2025-01-27 DIAGNOSIS — M25.551 PAIN IN RIGHT HIP: ICD-10-CM

## 2025-01-27 DIAGNOSIS — I25.10 ATHEROSCLEROTIC HEART DISEASE OF NATIVE CORONARY ARTERY WITHOUT ANGINA PECTORIS: ICD-10-CM

## 2025-01-27 DIAGNOSIS — Z29.9 ENCOUNTER FOR PROPHYLACTIC MEASURES, UNSPECIFIED: ICD-10-CM

## 2025-01-27 DIAGNOSIS — R09.89 OTHER SPECIFIED SYMPTOMS AND SIGNS INVOLVING THE CIRCULATORY AND RESPIRATORY SYSTEMS: ICD-10-CM

## 2025-01-27 DIAGNOSIS — I35.0 NONRHEUMATIC AORTIC (VALVE) STENOSIS: ICD-10-CM

## 2025-01-27 DIAGNOSIS — I50.32 CHRONIC DIASTOLIC (CONGESTIVE) HEART FAILURE: ICD-10-CM

## 2025-01-27 DIAGNOSIS — Z96.641 PRESENCE OF RIGHT ARTIFICIAL HIP JOINT: Chronic | ICD-10-CM

## 2025-01-27 DIAGNOSIS — I10 ESSENTIAL (PRIMARY) HYPERTENSION: ICD-10-CM

## 2025-01-27 DIAGNOSIS — F10.20 ALCOHOL DEPENDENCE, UNCOMPLICATED: ICD-10-CM

## 2025-01-27 DIAGNOSIS — Z98.890 OTHER SPECIFIED POSTPROCEDURAL STATES: Chronic | ICD-10-CM

## 2025-01-27 DIAGNOSIS — Z98.89 OTHER SPECIFIED POSTPROCEDURAL STATES: Chronic | ICD-10-CM

## 2025-01-27 DIAGNOSIS — J44.9 CHRONIC OBSTRUCTIVE PULMONARY DISEASE, UNSPECIFIED: ICD-10-CM

## 2025-01-27 LAB
ALBUMIN SERPL ELPH-MCNC: 2.9 G/DL — LOW (ref 3.3–5)
ALBUMIN SERPL ELPH-MCNC: 2.9 G/DL — LOW (ref 3.3–5)
ALP SERPL-CCNC: 109 U/L — SIGNIFICANT CHANGE UP (ref 40–120)
ALP SERPL-CCNC: 121 U/L — HIGH (ref 40–120)
ALT FLD-CCNC: 11 U/L — SIGNIFICANT CHANGE UP (ref 10–45)
ALT FLD-CCNC: 15 U/L — SIGNIFICANT CHANGE UP (ref 10–45)
ANION GAP SERPL CALC-SCNC: 12 MMOL/L — SIGNIFICANT CHANGE UP (ref 5–17)
ANION GAP SERPL CALC-SCNC: 9 MMOL/L — SIGNIFICANT CHANGE UP (ref 5–17)
ANISOCYTOSIS BLD QL: SLIGHT — SIGNIFICANT CHANGE UP
APTT BLD: 36.9 SEC — HIGH (ref 24.5–35.6)
AST SERPL-CCNC: 15 U/L — SIGNIFICANT CHANGE UP (ref 10–40)
AST SERPL-CCNC: 24 U/L — SIGNIFICANT CHANGE UP (ref 10–40)
BASOPHILS # BLD AUTO: 0 K/UL — SIGNIFICANT CHANGE UP (ref 0–0.2)
BASOPHILS # BLD AUTO: 0.02 K/UL — SIGNIFICANT CHANGE UP (ref 0–0.2)
BASOPHILS NFR BLD AUTO: 0 % — SIGNIFICANT CHANGE UP (ref 0–2)
BASOPHILS NFR BLD AUTO: 0.3 % — SIGNIFICANT CHANGE UP (ref 0–2)
BILIRUB SERPL-MCNC: 0.3 MG/DL — SIGNIFICANT CHANGE UP (ref 0.2–1.2)
BILIRUB SERPL-MCNC: 0.3 MG/DL — SIGNIFICANT CHANGE UP (ref 0.2–1.2)
BUN SERPL-MCNC: 29 MG/DL — HIGH (ref 7–23)
BUN SERPL-MCNC: 30 MG/DL — HIGH (ref 7–23)
CALCIUM SERPL-MCNC: 8.3 MG/DL — LOW (ref 8.4–10.5)
CALCIUM SERPL-MCNC: 8.4 MG/DL — SIGNIFICANT CHANGE UP (ref 8.4–10.5)
CHLORIDE SERPL-SCNC: 103 MMOL/L — SIGNIFICANT CHANGE UP (ref 96–108)
CHLORIDE SERPL-SCNC: 103 MMOL/L — SIGNIFICANT CHANGE UP (ref 96–108)
CO2 SERPL-SCNC: 26 MMOL/L — SIGNIFICANT CHANGE UP (ref 22–31)
CO2 SERPL-SCNC: 29 MMOL/L — SIGNIFICANT CHANGE UP (ref 22–31)
CREAT SERPL-MCNC: 0.93 MG/DL — SIGNIFICANT CHANGE UP (ref 0.5–1.3)
CREAT SERPL-MCNC: 1.08 MG/DL — SIGNIFICANT CHANGE UP (ref 0.5–1.3)
CRP SERPL-MCNC: 53 MG/L — HIGH (ref 0–4)
EGFR: 53 ML/MIN/1.73M2 — LOW
EGFR: 63 ML/MIN/1.73M2 — SIGNIFICANT CHANGE UP
ELLIPTOCYTES BLD QL SMEAR: SLIGHT — SIGNIFICANT CHANGE UP
EOSINOPHIL # BLD AUTO: 0.07 K/UL — SIGNIFICANT CHANGE UP (ref 0–0.5)
EOSINOPHIL # BLD AUTO: 0.17 K/UL — SIGNIFICANT CHANGE UP (ref 0–0.5)
EOSINOPHIL NFR BLD AUTO: 1.2 % — SIGNIFICANT CHANGE UP (ref 0–6)
EOSINOPHIL NFR BLD AUTO: 2.7 % — SIGNIFICANT CHANGE UP (ref 0–6)
GIANT PLATELETS BLD QL SMEAR: PRESENT — SIGNIFICANT CHANGE UP
GLUCOSE SERPL-MCNC: 189 MG/DL — HIGH (ref 70–99)
GLUCOSE SERPL-MCNC: 81 MG/DL — SIGNIFICANT CHANGE UP (ref 70–99)
HCT VFR BLD CALC: 36 % — SIGNIFICANT CHANGE UP (ref 34.5–45)
HCT VFR BLD CALC: 36.1 % — SIGNIFICANT CHANGE UP (ref 34.5–45)
HGB BLD-MCNC: 11.1 G/DL — LOW (ref 11.5–15.5)
HGB BLD-MCNC: 11.1 G/DL — LOW (ref 11.5–15.5)
HYPOCHROMIA BLD QL: SLIGHT — SIGNIFICANT CHANGE UP
IMM GRANULOCYTES NFR BLD AUTO: 0.5 % — SIGNIFICANT CHANGE UP (ref 0–0.9)
INR BLD: 1.35 RATIO — HIGH (ref 0.85–1.16)
LYMPHOCYTES # BLD AUTO: 0.22 K/UL — LOW (ref 1–3.3)
LYMPHOCYTES # BLD AUTO: 0.57 K/UL — LOW (ref 1–3.3)
LYMPHOCYTES # BLD AUTO: 3.6 % — LOW (ref 13–44)
LYMPHOCYTES # BLD AUTO: 9.8 % — LOW (ref 13–44)
MAGNESIUM SERPL-MCNC: 2.1 MG/DL — SIGNIFICANT CHANGE UP (ref 1.6–2.6)
MANUAL SMEAR VERIFICATION: SIGNIFICANT CHANGE UP
MCHC RBC-ENTMCNC: 30.7 G/DL — LOW (ref 32–36)
MCHC RBC-ENTMCNC: 30.8 G/DL — LOW (ref 32–36)
MCHC RBC-ENTMCNC: 30.8 PG — SIGNIFICANT CHANGE UP (ref 27–34)
MCHC RBC-ENTMCNC: 31.1 PG — SIGNIFICANT CHANGE UP (ref 27–34)
MCV RBC AUTO: 100.3 FL — HIGH (ref 80–100)
MCV RBC AUTO: 100.8 FL — HIGH (ref 80–100)
METAMYELOCYTES # FLD: 0.9 % — HIGH (ref 0–0)
METAMYELOCYTES NFR BLD: 0.9 % — HIGH (ref 0–0)
MONOCYTES # BLD AUTO: 0.6 K/UL — SIGNIFICANT CHANGE UP (ref 0–0.9)
MONOCYTES # BLD AUTO: 0.8 K/UL — SIGNIFICANT CHANGE UP (ref 0–0.9)
MONOCYTES NFR BLD AUTO: 13.8 % — SIGNIFICANT CHANGE UP (ref 2–14)
MONOCYTES NFR BLD AUTO: 9.8 % — SIGNIFICANT CHANGE UP (ref 2–14)
NEUTROPHILS # BLD AUTO: 4.32 K/UL — SIGNIFICANT CHANGE UP (ref 1.8–7.4)
NEUTROPHILS # BLD AUTO: 5.11 K/UL — SIGNIFICANT CHANGE UP (ref 1.8–7.4)
NEUTROPHILS NFR BLD AUTO: 74.4 % — SIGNIFICANT CHANGE UP (ref 43–77)
NEUTROPHILS NFR BLD AUTO: 83 % — HIGH (ref 43–77)
NRBC # BLD: 0 /100 WBCS — SIGNIFICANT CHANGE UP (ref 0–0)
NRBC BLD-RTO: 0 /100 WBCS — SIGNIFICANT CHANGE UP (ref 0–0)
NT-PROBNP SERPL-SCNC: 9477 PG/ML — HIGH (ref 0–300)
OVALOCYTES BLD QL SMEAR: SLIGHT — SIGNIFICANT CHANGE UP
PLAT MORPH BLD: NORMAL — SIGNIFICANT CHANGE UP
PLATELET # BLD AUTO: 225 K/UL — SIGNIFICANT CHANGE UP (ref 150–400)
PLATELET # BLD AUTO: 253 K/UL — SIGNIFICANT CHANGE UP (ref 150–400)
POIKILOCYTOSIS BLD QL AUTO: SLIGHT — SIGNIFICANT CHANGE UP
POLYCHROMASIA BLD QL SMEAR: SLIGHT — SIGNIFICANT CHANGE UP
POTASSIUM SERPL-MCNC: 4.7 MMOL/L — SIGNIFICANT CHANGE UP (ref 3.5–5.3)
POTASSIUM SERPL-MCNC: 4.8 MMOL/L — SIGNIFICANT CHANGE UP (ref 3.5–5.3)
POTASSIUM SERPL-SCNC: 4.7 MMOL/L — SIGNIFICANT CHANGE UP (ref 3.5–5.3)
POTASSIUM SERPL-SCNC: 4.8 MMOL/L — SIGNIFICANT CHANGE UP (ref 3.5–5.3)
PROT SERPL-MCNC: 6.7 G/DL — SIGNIFICANT CHANGE UP (ref 6–8.3)
PROT SERPL-MCNC: 6.9 G/DL — SIGNIFICANT CHANGE UP (ref 6–8.3)
PROTHROM AB SERPL-ACNC: 15.4 SEC — HIGH (ref 9.9–13.4)
RBC # BLD: 3.57 M/UL — LOW (ref 3.8–5.2)
RBC # BLD: 3.6 M/UL — LOW (ref 3.8–5.2)
RBC # FLD: 17.3 % — HIGH (ref 10.3–14.5)
RBC # FLD: 17.4 % — HIGH (ref 10.3–14.5)
RBC BLD AUTO: SIGNIFICANT CHANGE UP
SODIUM SERPL-SCNC: 141 MMOL/L — SIGNIFICANT CHANGE UP (ref 135–145)
SODIUM SERPL-SCNC: 141 MMOL/L — SIGNIFICANT CHANGE UP (ref 135–145)
WBC # BLD: 5.81 K/UL — SIGNIFICANT CHANGE UP (ref 3.8–10.5)
WBC # BLD: 6.16 K/UL — SIGNIFICANT CHANGE UP (ref 3.8–10.5)
WBC # FLD AUTO: 5.81 K/UL — SIGNIFICANT CHANGE UP (ref 3.8–10.5)
WBC # FLD AUTO: 6.16 K/UL — SIGNIFICANT CHANGE UP (ref 3.8–10.5)

## 2025-01-27 PROCEDURE — 73562 X-RAY EXAM OF KNEE 3: CPT | Mod: 26,RT

## 2025-01-27 PROCEDURE — 99285 EMERGENCY DEPT VISIT HI MDM: CPT

## 2025-01-27 PROCEDURE — 73552 X-RAY EXAM OF FEMUR 2/>: CPT | Mod: 26,RT

## 2025-01-27 PROCEDURE — 73502 X-RAY EXAM HIP UNI 2-3 VIEWS: CPT | Mod: 26,RT

## 2025-01-27 PROCEDURE — 93970 EXTREMITY STUDY: CPT | Mod: 26

## 2025-01-27 PROCEDURE — 71045 X-RAY EXAM CHEST 1 VIEW: CPT | Mod: 26

## 2025-01-27 PROCEDURE — 99223 1ST HOSP IP/OBS HIGH 75: CPT

## 2025-01-27 RX ORDER — ONDANSETRON HCL/PF 4 MG/2 ML
4 VIAL (ML) INJECTION EVERY 8 HOURS
Refills: 0 | Status: DISCONTINUED | OUTPATIENT
Start: 2025-01-27 | End: 2025-02-17

## 2025-01-27 RX ORDER — HYDROMORPHONE/SOD CHLOR,ISO/PF 2 MG/10 ML
0.5 SYRINGE (ML) INJECTION EVERY 6 HOURS
Refills: 0 | Status: DISCONTINUED | OUTPATIENT
Start: 2025-01-27 | End: 2025-01-28

## 2025-01-27 RX ORDER — ACETAMINOPHEN 500 MG/5ML
1000 LIQUID (ML) ORAL ONCE
Refills: 0 | Status: COMPLETED | OUTPATIENT
Start: 2025-01-27 | End: 2025-01-27

## 2025-01-27 RX ORDER — ENOXAPARIN SODIUM 100 MG/ML
40 INJECTION SUBCUTANEOUS EVERY 24 HOURS
Refills: 0 | Status: DISCONTINUED | OUTPATIENT
Start: 2025-01-27 | End: 2025-01-28

## 2025-01-27 RX ORDER — IPRATROPIUM BROMIDE AND ALBUTEROL SULFATE .5; 2.5 MG/3ML; MG/3ML
3 SOLUTION RESPIRATORY (INHALATION) ONCE
Refills: 0 | Status: COMPLETED | OUTPATIENT
Start: 2025-01-27 | End: 2025-01-27

## 2025-01-27 RX ORDER — MELATONIN 5 MG
3 TABLET ORAL AT BEDTIME
Refills: 0 | Status: DISCONTINUED | OUTPATIENT
Start: 2025-01-27 | End: 2025-02-17

## 2025-01-27 RX ORDER — POLYETHYLENE GLYCOL 3350 17 G/17G
17 POWDER, FOR SOLUTION ORAL DAILY
Refills: 0 | Status: DISCONTINUED | OUTPATIENT
Start: 2025-01-27 | End: 2025-02-15

## 2025-01-27 RX ORDER — ATORVASTATIN CALCIUM 80 MG/1
40 TABLET, FILM COATED ORAL AT BEDTIME
Refills: 0 | Status: DISCONTINUED | OUTPATIENT
Start: 2025-01-27 | End: 2025-02-17

## 2025-01-27 RX ORDER — ACETAMINOPHEN 500 MG/5ML
650 LIQUID (ML) ORAL EVERY 6 HOURS
Refills: 0 | Status: DISCONTINUED | OUTPATIENT
Start: 2025-01-27 | End: 2025-02-17

## 2025-01-27 RX ORDER — IPRATROPIUM BROMIDE AND ALBUTEROL SULFATE .5; 2.5 MG/3ML; MG/3ML
3 SOLUTION RESPIRATORY (INHALATION) EVERY 6 HOURS
Refills: 0 | Status: DISCONTINUED | OUTPATIENT
Start: 2025-01-27 | End: 2025-02-17

## 2025-01-27 RX ORDER — NICOTINE POLACRILEX 4 MG/1
4 GUM, CHEWING ORAL
Refills: 0 | Status: DISCONTINUED | OUTPATIENT
Start: 2025-01-27 | End: 2025-02-17

## 2025-01-27 RX ORDER — FUROSEMIDE 10 MG/ML
40 INJECTION INTRAMUSCULAR; INTRAVENOUS DAILY
Refills: 0 | Status: DISCONTINUED | OUTPATIENT
Start: 2025-01-27 | End: 2025-01-28

## 2025-01-27 RX ORDER — SENNA 187 MG
2 TABLET ORAL AT BEDTIME
Refills: 0 | Status: DISCONTINUED | OUTPATIENT
Start: 2025-01-27 | End: 2025-02-17

## 2025-01-27 RX ORDER — TIOTROPIUM BROMIDE INHALATION SPRAY 3.12 UG/1
2 SPRAY, METERED RESPIRATORY (INHALATION) DAILY
Refills: 0 | Status: DISCONTINUED | OUTPATIENT
Start: 2025-01-27 | End: 2025-02-17

## 2025-01-27 RX ORDER — GABAPENTIN 400 MG/1
400 CAPSULE ORAL
Refills: 0 | Status: DISCONTINUED | OUTPATIENT
Start: 2025-01-27 | End: 2025-02-17

## 2025-01-27 RX ADMIN — Medication 0.5 MILLIGRAM(S): at 22:10

## 2025-01-27 RX ADMIN — IPRATROPIUM BROMIDE AND ALBUTEROL SULFATE 3 MILLILITER(S): .5; 2.5 SOLUTION RESPIRATORY (INHALATION) at 16:26

## 2025-01-27 RX ADMIN — IPRATROPIUM BROMIDE AND ALBUTEROL SULFATE 3 MILLILITER(S): .5; 2.5 SOLUTION RESPIRATORY (INHALATION) at 23:29

## 2025-01-27 RX ADMIN — Medication 400 MILLIGRAM(S): at 16:26

## 2025-01-27 RX ADMIN — Medication 4 MILLIGRAM(S): at 17:17

## 2025-01-27 RX ADMIN — Medication 400 MILLIGRAM(S): at 23:50

## 2025-01-27 RX ADMIN — Medication 0.5 MILLIGRAM(S): at 21:19

## 2025-01-27 NOTE — ED ADULT NURSE NOTE - NS TRANSFER PATIENT BELONGINGS
Called and informed pt that he will need to bridge his Coumadin with Lovenox. Pt states he would like instructions mailed to him. I went over instructions with him and will mail instructions to him. Leodan called into pharmacy on file. Pt v/u. Told him to call office with any questions. Clothing

## 2025-01-27 NOTE — ED PROVIDER NOTE - OBJECTIVE STATEMENT
78yo F with PMH of PAD, CAD, COPD, HTN, HLD, DVT, chronic R hip pain since hip replacement 2 years ago BIBEMS presenting with b/l hip pain R > L. Patient reports her pain is severe and she can not take care of herself at home. Reports use of "a little" oxygen at home for her COPD, reports her breathing is at baseline and she is here for her hip pain. Per EMS, patient found to be hypoxic on RA at home. Patient denies any fever/chills, CP, new/worsening SOB, abdominal pain, n/v, recent falls. 78yo F with PMH of PAD, CAD, COPD, HTN, HLD, DVT, chronic R hip pain since hip replacement 2 years ago BIBEMS presenting with b/l hip pain R > L. Patient reports her pain is severe and she can not take care of herself at home. Reports use of "a little" oxygen at home for her COPD, reports her breathing is at baseline and she is here for her hip pain. Per EMS, patient found to be hypoxic on RA at home. Patient denies any fever/chills, CP, new/worsening SOB, abdominal pain, n/v, recent falls. Lives home with her  who patient reports had a stroke. 78yo F with PMH of CAD, COPD, HTN, HLD, DVT, chronic R hip pain since hip replacement 2 years ago BIBEMS presenting with b/l hip pain R > L. Patient reports her pain is severe and she can not take care of herself at home. Reports use of "a little" oxygen at home for her COPD, reports her breathing is at baseline and she is here for her hip pain. Per EMS, patient found to be hypoxic on RA at home. Patient denies any fever/chills, CP, new/worsening SOB, abdominal pain, n/v, recent falls. Lives home with her  who patient reports had a stroke. 78yo F with PMH of CAD, COPD, HTN, HLD, DVT, chronic R hip pain since hip replacement 2 years ago BIBEMS presenting with b/l hip pain R > L. Patient reports her pain is severe and she can not take care of herself at home. Reports use of "a little" oxygen at home for her COPD, reports her breathing is at baseline and she is here for her hip pain.  Per EMS, patient found to be hypoxic on RA at home. Patient denies any fever/chills, CP, new/worsening SOB, abdominal pain, n/v, recent falls. Lives home with her  who patient reports had a stroke.

## 2025-01-27 NOTE — H&P ADULT - PROBLEM SELECTOR PLAN 5
Hx of ETOH dependence and substance use in past as per prior notes. Patient denies any recent ETOH use.  -Low threshold to order CIWA  -Will order thiamine for now

## 2025-01-27 NOTE — ED ADULT NURSE NOTE - OBJECTIVE STATEMENT
78 yo F presents to ED A+OX3 but forgetful and poor historian via EMS c/o leg pain. Per EMS, patient was able to stand and pivot to EMS stretcher, was found to have SPO2 of 76 on room air, was placed on nasal cannula and SPO2 improved to high 90s. Per EMS, patient "had cigarettes everywhere." Patient reports 2 years of lower leg pain, states "I had a titanium hip and it's infected and I need vancomycin." Patient reports pain and burning sensation to bilateral lower extremities. Patient on 3L NC, tolerating well. Breathing is spontaneous and unlabored on room air. Skin warm pink and dry with redness noted to bilateral lower extremities. Bed in lowest position.

## 2025-01-27 NOTE — H&P ADULT - PROBLEM SELECTOR PLAN 7
-Trend BP  -Cont. Clonidine BID  -Patient does not recall taking Nifedipine, can resume prior dose if BPs uncontrolled

## 2025-01-27 NOTE — ED ADULT NURSE NOTE - NSFALLRISKINTERV_ED_ALL_ED
Assistance OOB with selected safe patient handling equipment if applicable/Assistance with ambulation/Communicate fall risk and risk factors to all staff, patient, and family/Monitor gait and stability/Provide visual cue: yellow wristband, yellow gown, etc/Reinforce activity limits and safety measures with patient and family/Call bell, personal items and telephone in reach/Instruct patient to call for assistance before getting out of bed/chair/stretcher/Non-slip footwear applied when patient is off stretcher/[place] to call system/Physically safe environment - no spills, clutter or unnecessary equipment/Purposeful Proactive Rounding/Room/bathroom lighting operational, light cord in reach

## 2025-01-27 NOTE — ED PROVIDER NOTE - MUSCULOSKELETAL, MLM
Spine appears normal, ROM intact of UEs, minimal ROM of b/l LE 2/2 swelling and pain. + b/l LE with diffuse redness and swelling from ankles to proximal thighs R >L

## 2025-01-27 NOTE — H&P ADULT - NSHPSOCIALHISTORY_GEN_ALL_CORE
Smokes cigarettes, won't specify amounts. States she does not drink often and has not consumed ETOH for many days. Has cane at home.

## 2025-01-27 NOTE — ED ADULT NURSE NOTE - AS PAIN REST
Patient calling for ultrasound results. She states she has seen them on her mychart. She states she is still so tired, fatigued, wore out. She says even after sleeping 10 hours she feels tired.  
Patient informed she verbalized understanding. Lab appt scheduled.  
REENAOV to call me back  
10 (severe pain)

## 2025-01-27 NOTE — H&P ADULT - ASSESSMENT
79F w/ hx of ETOH in past, PAD w/ b/l carotid artery stenosis, HFpEF EF 64% w/ severe AS, COPD, HTN, CAD, RLE DVT 8/2023 w/ hx of R hip infection p/w worsening R hip pain

## 2025-01-27 NOTE — CHART NOTE - NSCHARTNOTEFT_GEN_A_CORE
ISTOP reviewed Reference #: [id number]    PDI	Current Rx	Drug Type	Rx Written	Rx Dispensed	Drug	Quantity	Days Supply	Prescriber Name	Prescriber ZACHERY #	Payment Method	Dispenser  A	N	O	12/24/2024	12/26/2024	oxycodone-acetaminophen 5-325 mg tab	10	2	Robbie Bacon MD	[id number]	[email or web address]  A	N	O	11/25/2024	11/25/2024	diphenoxylate-atropine 2.5-0.025 mg tablet	30	8	Navdeep Montez MD	[id number]	[email or web address]  A	N	O	09/24/2024	09/25/2024	diphenoxylate-atropine 2.5-0.025 mg tablet	36	9	Navdeep Montez MD	[id number]	[email or web address]  B	N	O	07/12/2024	07/12/2024	oxycodone hcl (ir) 10 mg tab	56	14	Edward To (NP)	[id number]	[facility]  B	N	O	06/28/2024	06/28/2024	oxycodone hcl (ir) 10 mg tab	56	14	Edward To (NP)	[id number]	[facility]  C	N	O	07/26/2024	08/05/2024	oxycodone hcl (ir) 10 mg tab	20	7	Robbie Bacon MD	[id number]	[facility]  C	N	O	07/24/2024	07/25/2024	oxycodone hcl (ir) 10 mg tab	28	7	Edward To (NP)	[id number]	[facility]

## 2025-01-27 NOTE — ED PROVIDER NOTE - PRO INTERPRETER NEED 2
Impression: Presence of intraocular lens: Z96.1. Plan: Discussed diagnosis. Will continue to observe.
English

## 2025-01-27 NOTE — H&P ADULT - PROBLEM SELECTOR PLAN 1
Patient endorsing severe R hip pain w/o inciting fall or traumatic event. 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 06/2024 admission with possible joint infection. No clear signs of infection currently and exam is equivocal. Given hx, will eval further for infection. Could not reach patient's daughter overnight to discuss case.    -Pain control with Dilaudid 0.5mg IV Q6hrs PRN severe pain overnight, low threshold for pain control, as per prior admission there is some substance use history.  -Multimodal pain control  -Bowel regimen  -Send ESR and CRP STAT  -Send BCxs STAT  -F/u Ortho recommendations  -Will watch off antibiotics for now, low threshold to cover if patient develops signs of systemic infection  -Trend wbc, fever curve can consider ID consult in AM  -F/u plain films Patient endorsing severe R hip pain w/o inciting fall or traumatic event. 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 06/2024 admission with possible joint infection. No clear signs of infection currently and exam is equivocal. Given hx, will eval further for infection. Could not reach patient's daughter overnight to discuss case.    -Pain control with Dilaudid 0.5mg IV Q6hrs PRN severe pain overnight, low threshold for pain consult, as per prior admission there is some substance use history.  -Multimodal pain control  -Bowel regimen  -Send ESR and CRP STAT  -Send BCxs STAT  -F/u Ortho recommendations  -Will watch off antibiotics for now, low threshold to cover if patient develops signs of systemic infection  -Trend wbc, fever curve can consider ID consult in AM  -F/u plain films

## 2025-01-27 NOTE — ED PROVIDER NOTE - PROGRESS NOTE DETAILS
Spoke with daughter, Brittni, aware patient in ED. Reports patients pain is not well controlled. Reports pt is using alcohol at home and smoking more. Discussed admission. - Kalyani Damon PA-C Spoke with daughter, Brittni, aware patient in ED. Reports patients pain is not well controlled. Reports pt is using alcohol at home and smoking more. Discussed admission. Patient reports last drink was "days ago." - Kalyani Damon PA-C

## 2025-01-27 NOTE — H&P ADULT - PROBLEM SELECTOR PLAN 2
TTE w/ EF 64%, no wall motion abnormalities, mild diastolic dysfunction, severe AS during recent admission. Note significant LE edema and erythema bilaterally concerning for venous stasis w/ possible but less likely cellulitis. Dopplers negative for DVT. Patient has not been taking lasix at home, does not want to take overnight. Might reconsider when premafit available but not tonight.    -Try Lasix 40mg in AM  -Daily weight and I/O  -F/u inflammatory markers TTE w/ EF 64%, no wall motion abnormalities, mild diastolic dysfunction, severe AS during recent admission. Note significant LE edema and erythema bilaterally concerning for venous stasis w/ possible but less likely cellulitis. Dopplers negative for DVT. Patient has not been taking lasix at home, does not want to take overnight. Might reconsider when premafit available but not tonight.    -Try Lasix 40mg in AM  -Daily weight and I/O  -F/u inflammatory markers  -F/u BNP

## 2025-01-27 NOTE — ED PROVIDER NOTE - HIV OFFER
Previously Declined (within the last year) Valtrex Pregnancy And Lactation Text: this medication is Pregnancy Category B and is considered safe during pregnancy. This medication is not directly found in breast milk but it's metabolite acyclovir is present.

## 2025-01-27 NOTE — H&P ADULT - NSHPPHYSICALEXAM_GEN_ALL_CORE
Vital Signs Last 24 Hrs  T(C): 36.6 (01-27-25 @ 20:41), Max: 36.9 (01-27-25 @ 14:40)  T(F): 97.9 (01-27-25 @ 20:41), Max: 98.5 (01-27-25 @ 14:40)  HR: 97 (01-27-25 @ 20:41) (77 - 97)  BP: 143/66 (01-27-25 @ 20:41) (137/67 - 194/74)  BP(mean): 81 (01-27-25 @ 17:15) (81 - 89)  RR: 18 (01-27-25 @ 20:41) (16 - 18)  SpO2: 97% (01-27-25 @ 20:41) (95% - 100%)

## 2025-01-27 NOTE — H&P ADULT - PROBLEM SELECTOR PLAN 6
DVT PPx  -Lovenox hx of severe AS, note LE edema  -Patient does not  want to take Lasix overnight, patient willing to try premafit when bed which supports device available.  Will order Lasix in AM and reassess patient willingness to try  -There was plan during prior admission for structural heart evaluation after acute infectious concerns addressed

## 2025-01-27 NOTE — H&P ADULT - HISTORY OF PRESENT ILLNESS
79F w/ hx of ETOH in past, PAD w/ b/l carotid artery stenosis, HFpEF EF 64% w/ severe AS, COPD, HTN, CAD, RLE DVT 8/2023 w/ hx of R hip infection p/w worsening R hip pain. Patient is poor historian, not answering all questions.  79F w/ hx of ETOH in past, PAD w/ b/l carotid artery stenosis, HFpEF EF 64% w/ severe AS, COPD, HTN, CAD, RLE DVT 8/2023 w/ hx of R hip infection p/w worsening R hip pain. Patient is poor historian, not answering all questions she is being asked, continued requests for pain medication. Patient endorses being admitted to [facility] for multiple days for hip pain. States she did not receive antibiotics. Cannot specify nature of her admission. States she fell in front of [employer] before [place] admission but not since. States she was able to bear weight after leaving hospital around 3 days ago but pain has progressively worsened and does not think she can ambulate now. Denies fevers, chills or additional trauma. States she does not remember her medications and is not compliant with many of them outside of hypertension.     In ER: Given Tylenol 1gm IVPB, Duoneb x2, Morphine 4mg IV x1

## 2025-01-27 NOTE — ED PROVIDER NOTE - CARE PLAN
1 Principal Discharge DX:	Right hip pain  Secondary Diagnosis:	Generalized weakness  Secondary Diagnosis:	COPD, moderate

## 2025-01-27 NOTE — ED ADULT NURSE REASSESSMENT NOTE - NS ED NURSE REASSESS COMMENT FT1
Patient repeatedly yelling "my legs are burning, I need morphine." MD aware, morphine given per order.
Opt out

## 2025-01-27 NOTE — ED PROVIDER NOTE - ATTENDING APP SHARED VISIT CONTRIBUTION OF CARE
Attending MD Mustafa:  I performed a history and physical exam of the patient and discussed their management with the PA. I reviewed the PA's note and agree with the documented findings and plan of care. My medical decision making and observations are found above.    Attending MD Mustafa. Agree with above.  Pt is a 80 yo fem with hx of CAD, HTN, MRSA bacteremia, R hip pain, migraines, alcohol abuse, seizure, anxiety, HLD and chronic R hip pain s/p surg sev'l yrs ago.  Pt has chronic R hip pain that she reports is worse x 1-2 wks for unclear reasons.  No repeat fall/trauma.  Denies fevers/chills/n/v/d.  Pt noted to be hypoxic on arrival to ED.  5L O2 with O2 sats 90's with only mild persistent inc WOB.  PT endorses difficulty adhering to her asthma medication regimen at home.  Bilateral LE's edemaouts c/w anasarca. Concern for RLE edema>LLE.  Suspect need for admission for pain control, medical optimization and possible assistance with home 2/2 lives alone, disheveled and not managing med regimen/care at home.

## 2025-01-28 ENCOUNTER — RESULT REVIEW (OUTPATIENT)
Age: 80
End: 2025-01-28

## 2025-01-28 LAB
BASE EXCESS BLDA CALC-SCNC: 6.3 MMOL/L — HIGH (ref -2–3)
CO2 BLDA-SCNC: 35 MMOL/L — HIGH (ref 19–24)
ERYTHROCYTE [SEDIMENTATION RATE] IN BLOOD: 89 MM/HR — HIGH (ref 0–20)
GAS PNL BLDA: SIGNIFICANT CHANGE UP
HCO3 BLDA-SCNC: 33 MMOL/L — HIGH (ref 21–28)
HOROWITZ INDEX BLDA+IHG-RTO: 40 — SIGNIFICANT CHANGE UP
PCO2 BLDA: 56 MMHG — HIGH (ref 32–45)
PH BLDA: 7.38 — SIGNIFICANT CHANGE UP (ref 7.35–7.45)
PO2 BLDA: 76 MMHG — LOW (ref 83–108)
SAO2 % BLDA: 97.2 % — SIGNIFICANT CHANGE UP (ref 94–98)

## 2025-01-28 PROCEDURE — 93306 TTE W/DOPPLER COMPLETE: CPT | Mod: 26

## 2025-01-28 PROCEDURE — 99222 1ST HOSP IP/OBS MODERATE 55: CPT

## 2025-01-28 RX ORDER — HYDROMORPHONE/SOD CHLOR,ISO/PF 2 MG/10 ML
0.2 SYRINGE (ML) INJECTION ONCE
Refills: 0 | Status: DISCONTINUED | OUTPATIENT
Start: 2025-01-28 | End: 2025-01-28

## 2025-01-28 RX ORDER — RIVAROXABAN 10 MG/1
2.5 TABLET, FILM COATED ORAL
Refills: 0 | Status: DISCONTINUED | OUTPATIENT
Start: 2025-01-28 | End: 2025-02-17

## 2025-01-28 RX ORDER — HYDROMORPHONE/SOD CHLOR,ISO/PF 2 MG/10 ML
0.5 SYRINGE (ML) INJECTION ONCE
Refills: 0 | Status: DISCONTINUED | OUTPATIENT
Start: 2025-01-28 | End: 2025-01-28

## 2025-01-28 RX ORDER — FUROSEMIDE 10 MG/ML
20 INJECTION INTRAMUSCULAR; INTRAVENOUS
Refills: 0 | Status: DISCONTINUED | OUTPATIENT
Start: 2025-01-28 | End: 2025-01-31

## 2025-01-28 RX ORDER — NALOXONE HYDROCHLORIDE 0.4 MG/ML
0.2 INJECTION, SOLUTION INTRAMUSCULAR; INTRAVENOUS; SUBCUTANEOUS
Refills: 0 | Status: DISCONTINUED | OUTPATIENT
Start: 2025-01-28 | End: 2025-02-17

## 2025-01-28 RX ORDER — OXYCODONE HYDROCHLORIDE 30 MG/1
5 TABLET ORAL EVERY 4 HOURS
Refills: 0 | Status: DISCONTINUED | OUTPATIENT
Start: 2025-01-28 | End: 2025-02-04

## 2025-01-28 RX ORDER — DOXYCYCLINE HYCLATE 100 MG
100 TABLET ORAL EVERY 12 HOURS
Refills: 0 | Status: DISCONTINUED | OUTPATIENT
Start: 2025-01-28 | End: 2025-01-31

## 2025-01-28 RX ADMIN — Medication 0.5 MILLIGRAM(S): at 11:34

## 2025-01-28 RX ADMIN — Medication 100 MILLIGRAM(S): at 17:37

## 2025-01-28 RX ADMIN — Medication 15 MILLIGRAM(S): at 19:00

## 2025-01-28 RX ADMIN — RIVAROXABAN 2.5 MILLIGRAM(S): 10 TABLET, FILM COATED ORAL at 17:37

## 2025-01-28 RX ADMIN — Medication 0.5 MILLIGRAM(S): at 10:24

## 2025-01-28 RX ADMIN — Medication 0.2 MILLIGRAM(S): at 13:00

## 2025-01-28 RX ADMIN — Medication 1000 MILLIGRAM(S): at 01:10

## 2025-01-28 RX ADMIN — OXYCODONE HYDROCHLORIDE 5 MILLIGRAM(S): 30 TABLET ORAL at 20:58

## 2025-01-28 RX ADMIN — Medication 0.2 MILLIGRAM(S): at 02:14

## 2025-01-28 RX ADMIN — GABAPENTIN 400 MILLIGRAM(S): 400 CAPSULE ORAL at 05:38

## 2025-01-28 RX ADMIN — POLYETHYLENE GLYCOL 3350 17 GRAM(S): 17 POWDER, FOR SOLUTION ORAL at 12:05

## 2025-01-28 RX ADMIN — FUROSEMIDE 40 MILLIGRAM(S): 10 INJECTION INTRAMUSCULAR; INTRAVENOUS at 05:41

## 2025-01-28 RX ADMIN — IPRATROPIUM BROMIDE AND ALBUTEROL SULFATE 3 MILLILITER(S): .5; 2.5 SOLUTION RESPIRATORY (INHALATION) at 17:38

## 2025-01-28 RX ADMIN — Medication 0.2 MILLIGRAM(S): at 12:04

## 2025-01-28 RX ADMIN — OXYCODONE HYDROCHLORIDE 5 MILLIGRAM(S): 30 TABLET ORAL at 21:30

## 2025-01-28 RX ADMIN — Medication 15 MILLIGRAM(S): at 17:38

## 2025-01-28 RX ADMIN — FUROSEMIDE 20 MILLIGRAM(S): 10 INJECTION INTRAMUSCULAR; INTRAVENOUS at 15:06

## 2025-01-28 RX ADMIN — Medication 0.2 MILLIGRAM(S): at 01:17

## 2025-01-28 RX ADMIN — TIOTROPIUM BROMIDE INHALATION SPRAY 2 PUFF(S): 3.12 SPRAY, METERED RESPIRATORY (INHALATION) at 12:05

## 2025-01-28 RX ADMIN — IPRATROPIUM BROMIDE AND ALBUTEROL SULFATE 3 MILLILITER(S): .5; 2.5 SOLUTION RESPIRATORY (INHALATION) at 12:04

## 2025-01-28 RX ADMIN — IPRATROPIUM BROMIDE AND ALBUTEROL SULFATE 3 MILLILITER(S): .5; 2.5 SOLUTION RESPIRATORY (INHALATION) at 05:38

## 2025-01-28 RX ADMIN — Medication 0.5 MILLIGRAM(S): at 04:19

## 2025-01-28 RX ADMIN — Medication 0.5 MILLIGRAM(S): at 17:00

## 2025-01-28 RX ADMIN — ENOXAPARIN SODIUM 40 MILLIGRAM(S): 100 INJECTION SUBCUTANEOUS at 05:40

## 2025-01-28 RX ADMIN — Medication 1 DOSE(S): at 17:36

## 2025-01-28 RX ADMIN — Medication 100 MILLIGRAM(S): at 12:05

## 2025-01-28 RX ADMIN — Medication 1 DOSE(S): at 05:38

## 2025-01-28 RX ADMIN — Medication 0.1 MILLIGRAM(S): at 17:38

## 2025-01-28 RX ADMIN — Medication 0.5 MILLIGRAM(S): at 04:55

## 2025-01-28 RX ADMIN — Medication 0.1 MILLIGRAM(S): at 05:39

## 2025-01-28 RX ADMIN — Medication 0.5 MILLIGRAM(S): at 16:03

## 2025-01-28 NOTE — PHYSICAL THERAPY INITIAL EVALUATION ADULT - RANGE OF MOTION EXAMINATION, REHAB EVAL
RLE limited at hip/bilateral upper extremity ROM was WFL (within functional limits)/Left LE ROM was WFL (within functional limits)

## 2025-01-28 NOTE — CONSULT NOTE ADULT - ASSESSMENT
79F w/ hx of ETOH in past, PAD w/ b/l carotid artery stenosis, HFpEF EF 64% w/ severe AS, COPD, HTN, CAD, RLE DVT 8/2023 w/ hx of R hip infection p/w worsening R hip pain. Patient is poor historian, not answering all questions she is being asked, continued requests for pain medication. Patient endorses being admitted to [facility] for multiple days for hip pain. States she did not receive antibiotics. Cannot specify nature of her admission. States she fell in front of [employer] before [place] admission but not since. States she was able to bear weight after leaving hospital around 3 days ago but pain has progressively worsened and does not think she can ambulate now. Denies fevers, chills or additional trauma. States she does not remember her medications and is not compliant with many of them outside of hypertension.     Current out- patient pain regimen: none- a few small scripts for oxy 5 mg and oxy 10 mg  Out Patient Pain Management provider: none  [facility] Prescription Monitoring Program: Reference #[id number]  Opioid Risk Tool (ORT-OUD) Score: high    Current Pain Score: 10/10    Pt with acute on chronic R hip and LE pain.    Discontinue IV dilaudid.  Start Morphine ER 15 mg Q 12 hours.  Start Oxy IR 5 mg Q 4 hours PRN.  Continue neurontin 400 mg Q 8 hours- current CrCl is 43.5 and max daily dose is 1400 mg.  Narcan 0.2 mg IV Q 3 minutes PRN x 3 doses for respiratory depression/ suspected opioid OD.    Monitor for sedation and respiratory depression.  Bowel regimen.  Incentive spirometer.  OOB/ PT per primary team.    Discharge with a narcan rescue kit (naloxone 4 mg/ 0.1 ml nasal spray- 1 spray Q 2-3 minutes alternating between nostrils).  If continued pain management is needed after discharge, can see Dr [place] [phone].      Minutes spent on total encounter: 60 minutes      [facility]  [phone]

## 2025-01-28 NOTE — PROGRESS NOTE ADULT - ASSESSMENT
_________________________________________________________________________________________  ========>>  M E D I C A L   A T T E N D I N G    F O L L O W  U P  N O T E  <<=========  -----------------------------------------------------------------------------------------------------    - Patient seen and examined by me earlier today.   - In summary,  DAQUAN MARTINEZ is a 79y year old woman admitted with hip pain   - Patient today overall doing failrly, still complains of a ot of pain..     patient asking me about pain medications and doing surgical procedure to " get this plate out of me"  ( ortho note noted!)    ==================>> REVIEW OF SYSTEM <<=================    GEN: no fever, no chills, pain in right hip area..   RESP: no SOB, no cough, no sputum  CVS: no chest pain, no palpitations  GI: no abdominal pain, no nausea  : no dysuria, no frequency  Neuro: no headache, no dizziness    ==================>> PHYSICAL EXAM <<=================    GEN: A&O X 3 , NAD , comfortable, pleasant, calm ..   HEENT: NCAT, PERRL, MMM, hearing intact  CVS: S1S2 , regular , No M/R/G appreciated  PULM: CTA B/L,  no W/R/R appreciated  ABD.: soft. non tender, non distended,  bowel sounds present  Extrem: intact pulses , mild leg edema           ( Note written / Date of service 01-28-25 ( This is certified to be the same as "ENTERED" date above ( for billing purposes)))    ==================>> MEDICATIONS <<====================    MEDICATIONS  (STANDING):  albuterol/ipratropium for Nebulization 3 milliLiter(s) Nebulizer every 6 hours  atorvastatin 40 milliGRAM(s) Oral at bedtime  cloNIDine 0.1 milliGRAM(s) Oral every 12 hours  enoxaparin Injectable 40 milliGRAM(s) SubCutaneous every 24 hours  fluticasone propionate/ salmeterol 250-50 MICROgram(s) Diskus 1 Dose(s) Inhalation two times a day  furosemide   Injectable 20 milliGRAM(s) IV Push two times a day  gabapentin 400 milliGRAM(s) Oral two times a day  polyethylene glycol 3350 17 Gram(s) Oral daily  senna 2 Tablet(s) Oral at bedtime  thiamine 100 milliGRAM(s) Oral daily  tiotropium 2.5 MICROgram(s) Inhaler 2 Puff(s) Inhalation daily    MEDICATIONS  (PRN):  acetaminophen     Tablet .. 650 milliGRAM(s) Oral every 6 hours PRN Temp greater or equal to 38C (100.4F), Mild Pain (1 - 3)  HYDROmorphone  Injectable 0.5 milliGRAM(s) IV Push every 6 hours PRN Severe Pain (7 - 10)  melatonin 3 milliGRAM(s) Oral at bedtime PRN Insomnia  nicotine  Polacrilex Gum 4 milliGRAM(s) Oral four times a day PRN Smoking Cessation  ondansetron Injectable 4 milliGRAM(s) IV Push every 8 hours PRN Nausea and/or Vomiting    ___________  Active diet:  Diet, DASH/TLC:   Sodium & Cholesterol Restricted  ___________________    ==================>> VITAL SIGNS <<==================    T(C): 36.6 (01-28-25 @ 11:17), Max: 36.9 (01-27-25 @ 14:40)  HR: 61 (01-28-25 @ 11:17) (61 - 97)  BP: 179/78 (01-28-25 @ 11:17) (107/60 - 194/74)  BP(mean): 81 (01-27-25 @ 17:15)  RR: 18 (01-28-25 @ 11:17) (16 - 18)  SpO2: 96% (01-28-25 @ 11:17) (93% - 100%)      ==================>> LAB AND IMAGING <<==================                        11.1   5.81  )-----------( 225      ( 27 Jan 2025 22:26 )             36.0        01-27    141  |  103  |  29[H]  ----------------------------<  189[H]  4.7   |  29  |  1.08    Ca    8.4      27 Jan 2025 22:26  Mg     2.1     01-27    TPro  6.7  /  Alb  2.9[L]  /  TBili  0.3  /  DBili  x   /  AST  15  /  ALT  11  /  AlkPhos  109  01-27    PT/INR - ( 27 Jan 2025 16:35 )   PT: 15.4 sec;   INR: 1.35 ratio    PTT - ( 27 Jan 2025 16:35 )  PTT:36.9 sec              ^^^ Inflammatory markers :  ^^^  C R P :          53 (01-27-25)  <<--          E S R :        89 (01-27-25)     < from: Xray Knee 3 Views, Right (01.27.25 @ 23:18) >  IMPRESSION:  1.  Right longstem femoral revision hardware with lucency surrounding the   hardware and marked subsidence in keeping with loosening.  2.  Lucency at the lateral cortex of the proximal femur may reflect   postoperative change versus osteolysis.  3.  Pseudoarticulation between the proximal femur and right iliac bone  < end of copied text >    < from: [facility] Duplex Lower Ext Vein Scan, Bilat (01.27.25 @ 20:06) >  IMPRESSION:  No evidence of deep venous thrombosis in either lower extremity.  < end of copied text >    < from: TTE W or WO Ultrasound Enhancing Agent (06.17.24 @ 17:12) >  CONCLUSIONS:    1. Left ventricular cavity is normal in size. Left ventricular wall thickness is normal. Left ventricular systolic function is normal with an ejection fraction of 64 % by Rios's method of disks. There are no regional wall motion abnormalities seen.   2. There is mild (grade 1) left ventricular diastolic dysfunction.   3. Normal right ventricular cavity size and normal systolic function.   4. Structurally normal mitral valve with normal leaflet excursion. There is calcification of the mitral valve annulus. There is mild mitral regurgitation.   5. The aortic valve anatomy cannot be determined with reduced systolic excursion. There is calcification of the aortic valve leaflets. There is severe aortic stenosis. The peak transaortic velocity is 3.94 m/s, peak transaortic gradient is 62.1 mmHg and mean transaortic gradient is 32.0 mmHg with an LVOT/aortic valve VTI ratio of 0.22. The aorticvalve area is estimated at 0.51 cm² by the continuity equation. There is mild to moderate aortic regurgitation.   6. The left atrium is mildly dilated with an indexed volume of 41 ml/m².   7. No prior echocardiogram is available for comparison.  < end of copied text >    ___________________________________________________________________________________  ===============>>  A S S E S S M E N T   A N D   P L A N <<===============  ------------------------------------------------------------------------------------------    79F w/ hx of ETOH in past, PAD w/ b/l carotid artery stenosis, HFpEF EF 64% w/ severe AS, COPD, HTN, CAD, RLE DVT 8/2023 w/ hx of R hip infection p/w worsening R hip pain     Problem/Plan - 1:  ·  Problem: right hip pain.   ·  Plan: Patient endorsing severe R hip pain w/o inciting fall or traumatic event. 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 06/2024 admission with possible joint infection. No clear signs of infection currently and exam is equivocal. Given hx, will eval further for infection.   -Pain control with Dilaudid 0.5mg IV Q6hrs PRN severe pain overnight, low threshold for pain consult, as per prior admission there is some substance use history.     >> pain management consult        Multimodal pain control  -Bowel regimen  -ESR CRP as above   -follow BCxs   - Ortho following      -Will resume chronic antibiotics while awaiting blood cultures   -Trend wbc, fever curve can consider ID consult pending cultures..   - patient may be interested in surgery > ortho to follow up     Problem/Plan - 2:  ·  Problem: acute exacerbation of Chronic heart failure with preserved ejection fraction (HFpEF), edema, elevated BNP     in patient with severe Aortic stenosis as above   repeat TTE   diuretics per cardio   -Daily weight and I/O  cardio following   change to Xarelto 2.5 mg BID per cardio  will check D Dimer >> CTA if positive as per history of VTE      Problem/Plan - 3:  ·  Problem: COPD  ·  Plan: Notable wheeze on exam on presentation   ? has 02 PRN at home. Patient smoking tobacco, not clarifying how much.  -Duonebs Q6hrs  -Cont. Symbicort BID  -Cont. Spiriva  -Willing to try Nicorette gum.  - pulm evaluation as needed : house     Problem/Plan - 4:  ·  Problem: CAD (coronary artery disease).   ·  Plan: Patient states she does not take aspirin or atorvastatin at home.  cardio following, appreciated     Problem/Plan - 5:  ·  Problem: EtOH dependence.   ·  Plan: Hx of ETOH dependence and substance use in past as per prior notes. Patient denies any recent ETOH use.  -Low threshold to order CIWA  -Will order thiamine for now.     Problem/Plan - 6:  ·  Problem: Severe aortic stenosis.   ·  Plan: hx of severe AS, note LE edema  repeat echo  cardio appreciated     Problem/Plan - 7:  ·  Problem: Essential hypertension.   ·  Plan: -Trend BP  -Cont. Clonidine BID  -Patient does not recall taking Nifedipine, can resume prior dose if BPs uncontrolled.     Problem/Plan - 8:  ·  Problem: Prophylactic measure.   ·  Plan: DVT PPx  xarelto   --------------------------------------------  Case discussed with patient, Nurse Practitioner..   Education given on findings and plan of care  ___________________________  H. GONZALO Perez.  Pager: [phone]       _________________________________________________________________________________________  ========>>  M E D I C A L   A T T E N D I N G    F O L L O W  U P  N O T E  <<=========  -----------------------------------------------------------------------------------------------------    - Patient seen and examined by me earlier today.   - In summary,  DAQUAN MARTINEZ is a 79y year old woman admitted with hip pain   - Patient today overall doing failrly, still complains of a ot of pain..     patient asking me about pain medications and doing surgical procedure to " get this plate out of me"  ( ortho note noted!)    ==================>> REVIEW OF SYSTEM <<=================    GEN: no fever, no chills, pain in right hip area..   RESP: no SOB, no cough, no sputum  CVS: no chest pain, no palpitations  GI: no abdominal pain, no nausea  : no dysuria, no frequency  Neuro: no headache, no dizziness    ==================>> PHYSICAL EXAM <<=================    GEN: A&O X 3 , NAD , comfortable, pleasant, calm ..   HEENT: NCAT, PERRL, MMM, hearing intact  CVS: S1S2 , regular , No M/R/G appreciated  PULM: CTA B/L,  limited exam as not taking deep breaths   ABD.: soft. non tender, non distended,  Extrem: intact pulses , mild leg edema           ( Note written / Date of service 01-28-25 ( This is certified to be the same as "ENTERED" date above ( for billing purposes)))    ==================>> MEDICATIONS <<====================    MEDICATIONS  (STANDING):  albuterol/ipratropium for Nebulization 3 milliLiter(s) Nebulizer every 6 hours  atorvastatin 40 milliGRAM(s) Oral at bedtime  cloNIDine 0.1 milliGRAM(s) Oral every 12 hours  enoxaparin Injectable 40 milliGRAM(s) SubCutaneous every 24 hours  fluticasone propionate/ salmeterol 250-50 MICROgram(s) Diskus 1 Dose(s) Inhalation two times a day  furosemide   Injectable 20 milliGRAM(s) IV Push two times a day  gabapentin 400 milliGRAM(s) Oral two times a day  polyethylene glycol 3350 17 Gram(s) Oral daily  senna 2 Tablet(s) Oral at bedtime  thiamine 100 milliGRAM(s) Oral daily  tiotropium 2.5 MICROgram(s) Inhaler 2 Puff(s) Inhalation daily    MEDICATIONS  (PRN):  acetaminophen     Tablet .. 650 milliGRAM(s) Oral every 6 hours PRN Temp greater or equal to 38C (100.4F), Mild Pain (1 - 3)  HYDROmorphone  Injectable 0.5 milliGRAM(s) IV Push every 6 hours PRN Severe Pain (7 - 10)  melatonin 3 milliGRAM(s) Oral at bedtime PRN Insomnia  nicotine  Polacrilex Gum 4 milliGRAM(s) Oral four times a day PRN Smoking Cessation  ondansetron Injectable 4 milliGRAM(s) IV Push every 8 hours PRN Nausea and/or Vomiting    ___________  Active diet:  Diet, DASH/TLC:   Sodium & Cholesterol Restricted  ___________________    ==================>> VITAL SIGNS <<==================    T(C): 36.6 (01-28-25 @ 11:17), Max: 36.9 (01-27-25 @ 14:40)  HR: 61 (01-28-25 @ 11:17) (61 - 97)  BP: 179/78 (01-28-25 @ 11:17) (107/60 - 194/74)  BP(mean): 81 (01-27-25 @ 17:15)  RR: 18 (01-28-25 @ 11:17) (16 - 18)  SpO2: 96% (01-28-25 @ 11:17) (93% - 100%)      ==================>> LAB AND IMAGING <<==================                        11.1   5.81  )-----------( 225      ( 27 Jan 2025 22:26 )             36.0        01-27    141  |  103  |  29[H]  ----------------------------<  189[H]  4.7   |  29  |  1.08    Ca    8.4      27 Jan 2025 22:26  Mg     2.1     01-27    TPro  6.7  /  Alb  2.9[L]  /  TBili  0.3  /  DBili  x   /  AST  15  /  ALT  11  /  AlkPhos  109  01-27    PT/INR - ( 27 Jan 2025 16:35 )   PT: 15.4 sec;   INR: 1.35 ratio    PTT - ( 27 Jan 2025 16:35 )  PTT:36.9 sec              ^^^ Inflammatory markers :  ^^^  C R P :          53 (01-27-25)  <<--          E S R :        89 (01-27-25)     < from: Xray Knee 3 Views, Right (01.27.25 @ 23:18) >  IMPRESSION:  1.  Right longstem femoral revision hardware with lucency surrounding the   hardware and marked subsidence in keeping with loosening.  2.  Lucency at the lateral cortex of the proximal femur may reflect   postoperative change versus osteolysis.  3.  Pseudoarticulation between the proximal femur and right iliac bone  < end of copied text >    < from: VA Duplex Lower Ext Vein Scan, Bilat (01.27.25 @ 20:06) >  IMPRESSION:  No evidence of deep venous thrombosis in either lower extremity.  < end of copied text >    < from: TTE W or WO Ultrasound Enhancing Agent (06.17.24 @ 17:12) >  CONCLUSIONS:    1. Left ventricular cavity is normal in size. Left ventricular wall thickness is normal. Left ventricular systolic function is normal with an ejection fraction of 64 % by Rios's method of disks. There are no regional wall motion abnormalities seen.   2. There is mild (grade 1) left ventricular diastolic dysfunction.   3. Normal right ventricular cavity size and normal systolic function.   4. Structurally normal mitral valve with normal leaflet excursion. There is calcification of the mitral valve annulus. There is mild mitral regurgitation.   5. The aortic valve anatomy cannot be determined with reduced systolic excursion. There is calcification of the aortic valve leaflets. There is severe aortic stenosis. The peak transaortic velocity is 3.94 m/s, peak transaortic gradient is 62.1 mmHg and mean transaortic gradient is 32.0 mmHg with an LVOT/aortic valve VTI ratio of 0.22. The aorticvalve area is estimated at 0.51 cm² by the continuity equation. There is mild to moderate aortic regurgitation.   6. The left atrium is mildly dilated with an indexed volume of 41 ml/m².   7. No prior echocardiogram is available for comparison.  < end of copied text >    ___________________________________________________________________________________  ===============>>  A S S E S S M E N T   A N D   P L A N <<===============  ------------------------------------------------------------------------------------------    79F w/ hx of ETOH in past, PAD w/ b/l carotid artery stenosis, HFpEF EF 64% w/ severe AS, COPD, HTN, CAD, RLE DVT 8/2023 w/ hx of R hip infection p/w worsening R hip pain     Problem/Plan - 1:  ·  Problem: right hip pain.   ·  Plan: Patient endorsing severe R hip pain w/o inciting fall or traumatic event. 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 06/2024 admission with possible joint infection. No clear signs of infection currently and exam is equivocal. Given hx, will eval further for infection.   -Pain control with Dilaudid 0.5mg IV Q6hrs PRN severe pain overnight, low threshold for pain consult, as per prior admission there is some substance use history.     >> pain management consult        Multimodal pain control  -Bowel regimen  -ESR CRP as above   -follow BCxs   - Ortho following      -Will resume chronic antibiotics while awaiting blood cultures   -Trend wbc, fever curve can consider ID consult pending cultures..   - patient may be interested in surgery > ortho to follow up     Problem/Plan - 2:  ·  Problem: acute exacerbation of Chronic heart failure with preserved ejection fraction (HFpEF), edema, elevated BNP     in patient with severe Aortic stenosis as above   repeat TTE   diuretics per cardio   -Daily weight and I/O  cardio following   change to Xarelto 2.5 mg BID per cardio  will check D Dimer >> CTA if positive as per history of VTE      Problem/Plan - 3:  ·  Problem: COPD  ·  Plan: Notable wheeze on exam on presentation   ? has 02 PRN at home. Patient smoking tobacco, not clarifying how much.  -Duonebs Q6hrs  -Cont. Symbicort BID  -Cont. Spiriva  -Willing to try Nicorette gum.  - pulm evaluation as needed : house     Problem/Plan - 4:  ·  Problem: CAD (coronary artery disease).   ·  Plan: Patient states she does not take aspirin or atorvastatin at home.  cardio following, appreciated     Problem/Plan - 5:  ·  Problem: EtOH dependence.   ·  Plan: Hx of ETOH dependence and substance use in past as per prior notes. Patient denies any recent ETOH use.  -Low threshold to order CIWA  -Will order thiamine for now.     Problem/Plan - 6:  ·  Problem: Severe aortic stenosis.   ·  Plan: hx of severe AS, note LE edema  repeat echo  cardio appreciated     Problem/Plan - 7:  ·  Problem: Essential hypertension.   ·  Plan: -Trend BP  -Cont. Clonidine BID  -Patient does not recall taking Nifedipine, can resume prior dose if BPs uncontrolled.     Problem/Plan - 8:  ·  Problem: Prophylactic measure.   ·  Plan: DVT PPx  xarelto   --------------------------------------------  Case discussed with patient, Nurse Practitioner..   Education given on findings and plan of care  ___________________________  H. GONZALO Perez.  Pager: [phone]

## 2025-01-28 NOTE — PATIENT PROFILE ADULT - NSTOBACCO QUIT READY_GEN_A_CORE_SD
states she is " never gonna quit"  pt also currently declining to give info on how many PPD she smokes. Will readdress and  update if pt provides data./not motivated to quit

## 2025-01-28 NOTE — CONSULT NOTE ADULT - ASSESSMENT
ASSESSMENT & PLAN  79yFemale w/ R hip chronic PJI, on chronic suppressive abx, presenting with chronic R hip pain.    Plan:  -WBAT RLE  -pain control  -ice/cold compress  -c/w chronic abx per ID for chronic PJI       Gilma Vaughan, PGY-2  Orthopedic Surgery    [facility] [id number]  [facility]: [id number]  [facility]:  [id number]/ [phone]   ASSESSMENT & PLAN  79yFemale w/ R hip chronic PJI, on chronic suppressive abx, presenting with chronic R hip pain.    Plan:  -WBAT RLE  -pain control  -ice/cold compress  -c/w chronic abx per ID for chronic PJI   -surgical mgmt for her R chronic PJI would consist of Girdlestone resection arthroplasty. This was discussed with the patient initially in June 2024 and we again spoke with the patient about this surgical option today. However, patient is refusing the procedure. Given this, no acute orthopedic surgical intervention at this time  -f/u outpatient with Dr. Bailey as needed, call office for appt      Gilma Vaughan, PGY-2  Orthopedic Surgery    [facility] [id number]  [facility]: [id number]  [facility]:  [id number]/ [phone]

## 2025-01-28 NOTE — PHYSICAL THERAPY INITIAL EVALUATION ADULT - PLANNED THERAPY INTERVENTIONS, PT EVAL
stair training- GOAL: 7 stairs with cane/rail independent, 4 wks/balance training/bed mobility training/gait training/transfer training

## 2025-01-28 NOTE — PATIENT PROFILE ADULT - FALL HARM RISK - HARM RISK INTERVENTIONS
Assistance with ambulation/Assistance OOB with selected safe patient handling equipment/Communicate Risk of Fall with Harm to all staff/Discuss with provider need for PT consult/Monitor for mental status changes/Monitor gait and stability/Provide patient with walking aids - walker, cane, crutches/Reinforce activity limits and safety measures with patient and family/Tailored Fall Risk Interventions/Toileting schedule using arm’s reach rule for commode and bathroom/Use of alarms - bed, chair and/or voice tab/Visual Cue: Yellow wristband and red socks/Bed in lowest position, wheels locked, appropriate side rails in place/Call bell, personal items and telephone in reach/Instruct patient to call for assistance before getting out of bed or chair/Non-slip footwear when patient is out of bed/[place] to call system/Physically safe environment - no spills, clutter or unnecessary equipment/Purposeful Proactive Rounding/Room/bathroom lighting operational, light cord in reach Assistance with ambulation/Assistance OOB with selected safe patient handling equipment/Communicate Risk of Fall with Harm to all staff/Discuss with provider need for PT consult/Monitor gait and stability/Provide patient with walking aids - walker, cane, crutches/Reinforce activity limits and safety measures with patient and family/Tailored Fall Risk Interventions/Visual Cue: Yellow wristband and red socks/Bed in lowest position, wheels locked, appropriate side rails in place/Call bell, personal items and telephone in reach/Instruct patient to call for assistance before getting out of bed or chair/Non-slip footwear when patient is out of bed/[place] to call system/Physically safe environment - no spills, clutter or unnecessary equipment/Purposeful Proactive Rounding/Room/bathroom lighting operational, light cord in reach

## 2025-01-28 NOTE — CONSULT NOTE ADULT - SUBJECTIVE AND OBJECTIVE BOX
HPI  79yFemale who underwent R USAMA with Dr. Silva in 2018, ORIF R hip and revision R USAMA by Dr. Be in _ and _, and has a known R hip PJI w last hip cx in June 2024 positive for MRSA, on chronic suppressive abx, presenting w chronic R hip pain. Notes that she stopped taking her doxycycline 3 days ago, and has had increased pain for the past 2 days. Notes that the pain in her R hip is better or worse at times, and this current episode is no change from her normal flare ups of pain. States that because of the pain in her R hip, she has not been ambulating for the past 2 days. Normally walks w RW. Denies any recent trauma. Denies any recent fever/chills.     Has pmhx ETOH use, CAD, COPD, HTN, HLD, DVT, PAD, CHF, seizure disorder. During patient's last admission in June 2024, Dr. Bailey spoke with the patient about surgery to address her R hip chronic PJI and offered her a girdlestone. However, the patient refused surgery at the time.    ROS  Negative unless otherwise specified in HPI.    PAST MEDICAL & SURGICAL Hx  PAST MEDICAL & SURGICAL HISTORY:  Hypertension      Hyperlipidemia      CAD (coronary artery disease)      Migraine      Anxiety      Emphysema, unspecified      HTN (hypertension)      Anxiety      Coronary artery disease      Stented coronary artery      Seizure      Alcohol abuse      Migraine      Pain of right hip joint      MRSA bacteremia      Essential hypertension      CAD (coronary artery disease)      Elevated brain natriuretic peptide (BNP) level      S/P bladder repair      Status post total hip replacement, right  5/21/18      History of arthroplasty of right hip          MEDICATIONS  Home Medications:  Albuterol (Eqv-ProAir HFA) 90 mcg/inh inhalation aerosol: 2 inhaled every 6 hours as needed for  shortness of breath and/or wheezing (27 Jan 2025 19:04)  atorvastatin 40 mg oral tablet: 1 tab(s) orally once a day (at bedtime) (27 Jan 2025 19:04)  budesonide-formoterol 80 mcg-4.5 mcg/inh inhalation aerosol: 2 puff(s) inhaled 2 times a day (27 Jan 2025 19:04)  butalbital/acetaminophen/caffeine 50 mg-325 mg-40 mg oral tablet: 1 tab(s) orally once a day (27 Jan 2025 19:04)  cloNIDine 0.1 mg oral tablet: 1 tab(s) orally every 12 hours (27 Jan 2025 19:04)  doxycycline hyclate 100 mg oral capsule: 1 cap(s) orally 2 times a day (27 Jan 2025 19:04)  gabapentin 400 mg oral tablet: 1 tab(s) orally 2 times a day (27 Jan 2025 19:04)  Percocet 5 mg-325 mg oral tablet: 1 tab(s) orally 2 times a day (27 Jan 2025 19:04)  Procardia XL 60 mg oral tablet, extended release: 1 tab(s) orally once a day (27 Jan 2025 19:04)      ALLERGIES  No Known Allergies      FAMILY Hx  FAMILY HISTORY:  Family history of coronary artery disease in daughter (Child)    Family history of essential hypertension        SOCIAL Hx  Social History:  Smokes cigarettes, won't specify amounts. States she does not drink often and has not consumed ETOH for many days. Has cane at home. (27 Jan 2025 21:36)      VITALS  Vital Signs Last 24 Hrs  T(C): 36.7 (27 Jan 2025 23:59), Max: 36.9 (27 Jan 2025 14:40)  T(F): 98.1 (27 Jan 2025 23:59), Max: 98.5 (27 Jan 2025 14:40)  HR: 77 (27 Jan 2025 23:59) (77 - 97)  BP: 136/53 (27 Jan 2025 23:59) (107/60 - 194/74)  BP(mean): 81 (27 Jan 2025 17:15) (81 - 89)  RR: 18 (27 Jan 2025 23:59) (16 - 18)  SpO2: 93% (27 Jan 2025 23:59) (93% - 100%)    Parameters below as of 27 Jan 2025 23:59  Patient On (Oxygen Delivery Method): nasal cannula  O2 Flow (L/min): 3      PHYSICAL EXAM  Gen: Lying in bed, non-toxic appearing, NAD  Resp: No increased WOB  RLE:  Skin intact, posterior hip incision healed, no erythema of hip  no TTP over hip, not warmer to touch relative to L side; compartments soft  L hip passive ROM 0-45 deg, limited 2/2 pain and stiffness  Motor: TA/EHL/GS/FHL intact  Sensory: DP/SP/Tib/Adryan/Saph SILT  +DP pulse, WWP    LABS                        11.1   5.81  )-----------( 225      ( 27 Jan 2025 22:26 )             36.0     01-27    141  |  103  |  29[H]  ----------------------------<  189[H]  4.7   |  29  |  1.08    Ca    8.4      27 Jan 2025 22:26  Mg     2.1     01-27    TPro  6.7  /  Alb  2.9[L]  /  TBili  0.3  /  DBili  x   /  AST  15  /  ALT  11  /  AlkPhos  109  01-27    PT/INR - ( 27 Jan 2025 16:35 )   PT: 15.4 sec;   INR: 1.35 ratio         PTT - ( 27 Jan 2025 16:35 )  PTT:36.9 sec        IMAGING     HPI  79yFemale who underwent R USAMA with Dr. Silva in 2018, ORIF R hip and revision R USAMA by Dr. Be in 2018 for periprosthetic fx, revision R USAMA by Dr. Be in 2019 for R hip PJI, with known R hip PJI and last hip aspiration cx in June 2024 positive for MRSA, presenting w chronic R hip pain. Pt has been on chronic suppressive antibiotics but notes that she stopped taking her doxycycline 3 days ago and has had increased pain for the past 2 days. Notes that the pain in her R hip is better or worse at times, and this current episode is no change from her normal flare ups of pain. States that because of the pain in her R hip, she has not been ambulating for the past 2 days. Normally walks w RW. Denies any recent trauma. Denies any recent fever/chills.     Has pmhx ETOH use, CAD, COPD, HTN, HLD, DVT, PAD, CHF, seizure disorder. During patient's last admission in June 2024, Dr. Bailey spoke with the patient about surgery to address her R hip chronic PJI and offered her a girdlestone. However, the patient refused surgery at the time.    ROS  Negative unless otherwise specified in HPI.    PAST MEDICAL & SURGICAL Hx  PAST MEDICAL & SURGICAL HISTORY:  Hypertension      Hyperlipidemia      CAD (coronary artery disease)      Migraine      Anxiety      Emphysema, unspecified      HTN (hypertension)      Anxiety      Coronary artery disease      Stented coronary artery      Seizure      Alcohol abuse      Migraine      Pain of right hip joint      MRSA bacteremia      Essential hypertension      CAD (coronary artery disease)      Elevated brain natriuretic peptide (BNP) level      S/P bladder repair      Status post total hip replacement, right  5/21/18      History of arthroplasty of right hip          MEDICATIONS  Home Medications:  Albuterol (Eqv-ProAir HFA) 90 mcg/inh inhalation aerosol: 2 inhaled every 6 hours as needed for  shortness of breath and/or wheezing (27 Jan 2025 19:04)  atorvastatin 40 mg oral tablet: 1 tab(s) orally once a day (at bedtime) (27 Jan 2025 19:04)  budesonide-formoterol 80 mcg-4.5 mcg/inh inhalation aerosol: 2 puff(s) inhaled 2 times a day (27 Jan 2025 19:04)  butalbital/acetaminophen/caffeine 50 mg-325 mg-40 mg oral tablet: 1 tab(s) orally once a day (27 Jan 2025 19:04)  cloNIDine 0.1 mg oral tablet: 1 tab(s) orally every 12 hours (27 Jan 2025 19:04)  doxycycline hyclate 100 mg oral capsule: 1 cap(s) orally 2 times a day (27 Jan 2025 19:04)  gabapentin 400 mg oral tablet: 1 tab(s) orally 2 times a day (27 Jan 2025 19:04)  Percocet 5 mg-325 mg oral tablet: 1 tab(s) orally 2 times a day (27 Jan 2025 19:04)  Procardia XL 60 mg oral tablet, extended release: 1 tab(s) orally once a day (27 Jan 2025 19:04)      ALLERGIES  No Known Allergies      FAMILY Hx  FAMILY HISTORY:  Family history of coronary artery disease in daughter (Child)    Family history of essential hypertension        SOCIAL Hx  Social History:  Smokes cigarettes, won't specify amounts. States she does not drink often and has not consumed ETOH for many days. Has cane at home. (27 Jan 2025 21:36)      VITALS  Vital Signs Last 24 Hrs  T(C): 36.7 (27 Jan 2025 23:59), Max: 36.9 (27 Jan 2025 14:40)  T(F): 98.1 (27 Jan 2025 23:59), Max: 98.5 (27 Jan 2025 14:40)  HR: 77 (27 Jan 2025 23:59) (77 - 97)  BP: 136/53 (27 Jan 2025 23:59) (107/60 - 194/74)  BP(mean): 81 (27 Jan 2025 17:15) (81 - 89)  RR: 18 (27 Jan 2025 23:59) (16 - 18)  SpO2: 93% (27 Jan 2025 23:59) (93% - 100%)    Parameters below as of 27 Jan 2025 23:59  Patient On (Oxygen Delivery Method): nasal cannula  O2 Flow (L/min): 3      PHYSICAL EXAM  Gen: Lying in bed, non-toxic appearing, NAD  Resp: No increased WOB  RLE:  Skin intact, posterior hip incision healed, no erythema of hip  no TTP over hip, not warmer to touch relative to L side; compartments soft  L hip passive ROM 0-45 deg, limited 2/2 pain and stiffness  Motor: TA/EHL/GS/FHL intact  Sensory: DP/SP/Tib/Adryan/Saph SILT  +DP pulse, WWP    LABS                        11.1   5.81  )-----------( 225      ( 27 Jan 2025 22:26 )             36.0     01-27    141  |  103  |  29[H]  ----------------------------<  189[H]  4.7   |  29  |  1.08    Ca    8.4      27 Jan 2025 22:26  Mg     2.1     01-27    TPro  6.7  /  Alb  2.9[L]  /  TBili  0.3  /  DBili  x   /  AST  15  /  ALT  11  /  AlkPhos  109  01-27    PT/INR - ( 27 Jan 2025 16:35 )   PT: 15.4 sec;   INR: 1.35 ratio         PTT - ( 27 Jan 2025 16:35 )  PTT:36.9 sec        IMAGING     HPI  79yFemale who underwent R USAMA with Dr. Jaime in 2018, ORIF R hip and revision R USAMA by Dr. Be in 2018 for periprosthetic fx, revision R USAMA by Dr. Be in 2019 for R hip PJI, with known R hip PJI and last hip aspiration cx in June 2024 positive for MRSA, presenting w chronic R hip pain. Pt has been on chronic suppressive antibiotics but notes that she stopped taking her doxycycline 3 days ago and has had increased pain for the past 2 days. Notes that the pain in her R hip is better or worse at times, and this current episode is no change from her normal flare ups of pain. States that because of the pain in her R hip, she has not been ambulating for the past 2 days. Normally walks w RW. Denies any recent trauma. Denies any recent fever/chills.     Has pmhx ETOH use, CAD, COPD, HTN, HLD, DVT, PAD, CHF, seizure disorder. During patient's last admission in June 2024, Dr. Bailey spoke with the patient extensively about a R hip girdlestone resection arthroplasty, the surgical procedure to address her R hip chronic PJI. However, the patient refused surgery at the time. This surgical option was again discussed today with the patient, and she once again refused.    ROS  Negative unless otherwise specified in HPI.    PAST MEDICAL & SURGICAL Hx  PAST MEDICAL & SURGICAL HISTORY:  Hypertension      Hyperlipidemia      CAD (coronary artery disease)      Migraine      Anxiety      Emphysema, unspecified      HTN (hypertension)      Anxiety      Coronary artery disease      Stented coronary artery      Seizure      Alcohol abuse      Migraine      Pain of right hip joint      MRSA bacteremia      Essential hypertension      CAD (coronary artery disease)      Elevated brain natriuretic peptide (BNP) level      S/P bladder repair      Status post total hip replacement, right  5/21/18      History of arthroplasty of right hip          MEDICATIONS  Home Medications:  Albuterol (Eqv-ProAir HFA) 90 mcg/inh inhalation aerosol: 2 inhaled every 6 hours as needed for  shortness of breath and/or wheezing (27 Jan 2025 19:04)  atorvastatin 40 mg oral tablet: 1 tab(s) orally once a day (at bedtime) (27 Jan 2025 19:04)  budesonide-formoterol 80 mcg-4.5 mcg/inh inhalation aerosol: 2 puff(s) inhaled 2 times a day (27 Jan 2025 19:04)  butalbital/acetaminophen/caffeine 50 mg-325 mg-40 mg oral tablet: 1 tab(s) orally once a day (27 Jan 2025 19:04)  cloNIDine 0.1 mg oral tablet: 1 tab(s) orally every 12 hours (27 Jan 2025 19:04)  doxycycline hyclate 100 mg oral capsule: 1 cap(s) orally 2 times a day (27 Jan 2025 19:04)  gabapentin 400 mg oral tablet: 1 tab(s) orally 2 times a day (27 Jan 2025 19:04)  Percocet 5 mg-325 mg oral tablet: 1 tab(s) orally 2 times a day (27 Jan 2025 19:04)  Procardia XL 60 mg oral tablet, extended release: 1 tab(s) orally once a day (27 Jan 2025 19:04)      ALLERGIES  No Known Allergies      FAMILY Hx  FAMILY HISTORY:  Family history of coronary artery disease in daughter (Child)    Family history of essential hypertension        SOCIAL Hx  Social History:  Smokes cigarettes, won't specify amounts. States she does not drink often and has not consumed ETOH for many days. Has cane at home. (27 Jan 2025 21:36)      VITALS  Vital Signs Last 24 Hrs  T(C): 36.7 (27 Jan 2025 23:59), Max: 36.9 (27 Jan 2025 14:40)  T(F): 98.1 (27 Jan 2025 23:59), Max: 98.5 (27 Jan 2025 14:40)  HR: 77 (27 Jan 2025 23:59) (77 - 97)  BP: 136/53 (27 Jan 2025 23:59) (107/60 - 194/74)  BP(mean): 81 (27 Jan 2025 17:15) (81 - 89)  RR: 18 (27 Jan 2025 23:59) (16 - 18)  SpO2: 93% (27 Jan 2025 23:59) (93% - 100%)    Parameters below as of 27 Jan 2025 23:59  Patient On (Oxygen Delivery Method): nasal cannula  O2 Flow (L/min): 3      PHYSICAL EXAM  Gen: Lying in bed, non-toxic appearing, NAD  Resp: No increased WOB  RLE:  Skin intact, posterior hip incision healed, no erythema of hip  no TTP over hip, not warmer to touch relative to L side; compartments soft  L hip passive ROM 0-45 deg, limited 2/2 pain and stiffness  Motor: TA/EHL/GS/FHL intact  Sensory: DP/SP/Tib/Adryan/Saph SILT  +DP pulse, WWP    LABS                        11.1   5.81  )-----------( 225      ( 27 Jan 2025 22:26 )             36.0     01-27    141  |  103  |  29[H]  ----------------------------<  189[H]  4.7   |  29  |  1.08    Ca    8.4      27 Jan 2025 22:26  Mg     2.1     01-27    TPro  6.7  /  Alb  2.9[L]  /  TBili  0.3  /  DBili  x   /  AST  15  /  ALT  11  /  AlkPhos  109  01-27    PT/INR - ( 27 Jan 2025 16:35 )   PT: 15.4 sec;   INR: 1.35 ratio         PTT - ( 27 Jan 2025 16:35 )  PTT:36.9 sec        IMAGING     HPI  79yFemale who underwent R USAMA with Dr. Jaime in 2018, ORIF R hip and revision R USAMA by Dr. Be in 2018 for periprosthetic fx, revision R USAMA by Dr. Be in 2019 for R hip PJI, with known R hip PJI and last hip aspiration cx in June 2024 positive for MRSA, presenting w chronic R hip pain. Pt has been on chronic suppressive antibiotics but notes that she stopped taking her doxycycline 3 days ago and has had increased pain for the past 2 days. Notes that the pain in her R hip is better or worse at times, and this current episode is no change from her normal flare ups of pain. States that because of the pain in her R hip, she has not been ambulating for the past 2 days. Normally walks w RW. Denies any recent trauma. Denies any recent fever/chills.     Has pmhx ETOH use, CAD, COPD, HTN, HLD, DVT, PAD, CHF, seizure disorder. During patient's last admission in June 2024, Dr. Bailey spoke with the patient extensively about a R hip girdlestone resection arthroplasty, the surgical procedure to address her R hip chronic PJI. However, the patient refused surgery at the time. This surgical option was again discussed today with the patient, and she once again refused.    ROS  Negative unless otherwise specified in HPI.    PAST MEDICAL & SURGICAL Hx  PAST MEDICAL & SURGICAL HISTORY:  Hypertension      Hyperlipidemia      CAD (coronary artery disease)      Migraine      Anxiety      Emphysema, unspecified      HTN (hypertension)      Anxiety      Coronary artery disease      Stented coronary artery      Seizure      Alcohol abuse      Migraine      Pain of right hip joint      MRSA bacteremia      Essential hypertension      CAD (coronary artery disease)      Elevated brain natriuretic peptide (BNP) level      S/P bladder repair      Status post total hip replacement, right  5/21/18      History of arthroplasty of right hip          MEDICATIONS  Home Medications:  Albuterol (Eqv-ProAir HFA) 90 mcg/inh inhalation aerosol: 2 inhaled every 6 hours as needed for  shortness of breath and/or wheezing (27 Jan 2025 19:04)  atorvastatin 40 mg oral tablet: 1 tab(s) orally once a day (at bedtime) (27 Jan 2025 19:04)  budesonide-formoterol 80 mcg-4.5 mcg/inh inhalation aerosol: 2 puff(s) inhaled 2 times a day (27 Jan 2025 19:04)  butalbital/acetaminophen/caffeine 50 mg-325 mg-40 mg oral tablet: 1 tab(s) orally once a day (27 Jan 2025 19:04)  cloNIDine 0.1 mg oral tablet: 1 tab(s) orally every 12 hours (27 Jan 2025 19:04)  doxycycline hyclate 100 mg oral capsule: 1 cap(s) orally 2 times a day (27 Jan 2025 19:04)  gabapentin 400 mg oral tablet: 1 tab(s) orally 2 times a day (27 Jan 2025 19:04)  Percocet 5 mg-325 mg oral tablet: 1 tab(s) orally 2 times a day (27 Jan 2025 19:04)  Procardia XL 60 mg oral tablet, extended release: 1 tab(s) orally once a day (27 Jan 2025 19:04)      ALLERGIES  No Known Allergies      FAMILY Hx  FAMILY HISTORY:  Family history of coronary artery disease in daughter (Child)    Family history of essential hypertension        SOCIAL Hx  Social History:  Smokes cigarettes, won't specify amounts. States she does not drink often and has not consumed ETOH for many days. Has cane at home. (27 Jan 2025 21:36)      VITALS  Vital Signs Last 24 Hrs  T(C): 36.7 (27 Jan 2025 23:59), Max: 36.9 (27 Jan 2025 14:40)  T(F): 98.1 (27 Jan 2025 23:59), Max: 98.5 (27 Jan 2025 14:40)  HR: 77 (27 Jan 2025 23:59) (77 - 97)  BP: 136/53 (27 Jan 2025 23:59) (107/60 - 194/74)  BP(mean): 81 (27 Jan 2025 17:15) (81 - 89)  RR: 18 (27 Jan 2025 23:59) (16 - 18)  SpO2: 93% (27 Jan 2025 23:59) (93% - 100%)    Parameters below as of 27 Jan 2025 23:59  Patient On (Oxygen Delivery Method): nasal cannula  O2 Flow (L/min): 3      PHYSICAL EXAM  Gen: Lying in bed, non-toxic appearing, NAD  Resp: No increased WOB  RLE:  Skin intact, posterior hip incision healed, no erythema of hip  no TTP over hip, not warmer to touch relative to L side; compartments soft  L hip passive ROM 0-45 deg, limited 2/2 pain and stiffness  Motor: TA/EHL/GS/FHL intact  Sensory: DP/SP/Tib/Adryan/Saph SILT  +DP pulse, WWP    LABS                        11.1   5.81  )-----------( 225      ( 27 Jan 2025 22:26 )             36.0     01-27    141  |  103  |  29[H]  ----------------------------<  189[H]  4.7   |  29  |  1.08    Ca    8.4      27 Jan 2025 22:26  Mg     2.1     01-27    TPro  6.7  /  Alb  2.9[L]  /  TBili  0.3  /  DBili  x   /  AST  15  /  ALT  11  /  AlkPhos  109  01-27    PT/INR - ( 27 Jan 2025 16:35 )   PT: 15.4 sec;   INR: 1.35 ratio         PTT - ( 27 Jan 2025 16:35 )  PTT:36.9 sec        IMAGING  XR R hip: Intact aligned right total hip prosthesis with screw fixated acetabular cup. No acute periprosthetic fx.

## 2025-01-28 NOTE — PHYSICAL THERAPY INITIAL EVALUATION ADULT - BALANCE DISTURBANCE, SYSTEM IMPAIRMENT CONTRIBUTE, REHAB EVAL
Duration Of Freeze Thaw-Cycle (Seconds): 0 Render Post-Care Instructions In Note?: no Show Applicator Variable?: Yes Post-Care Instructions: I reviewed with the patient in detail post-care instructions. Patient is to wear sunprotection, and avoid picking at any of the treated lesions. Pt may apply Vaseline to crusted or scabbing areas. Detail Level: Detailed Consent: The patient's consent was obtained including but not limited to risks of crusting, scabbing, blistering, scarring, darker or lighter pigmentary change, recurrence, incomplete removal and infection. Number Of Freeze-Thaw Cycles: 1 freeze-thaw cycle neuromuscular/musculoskeletal

## 2025-01-28 NOTE — PHYSICAL THERAPY INITIAL EVALUATION ADULT - ADDITIONAL COMMENTS
Pt lives with  in private house,  has HHA. Pt ambulates with cane or rolling walker, has recently declined in function. 7 stairs to enter, 3 outside, 4 inside. No indoor stairs to negotiate.

## 2025-01-28 NOTE — CONSULT NOTE ADULT - SUBJECTIVE AND OBJECTIVE BOX
Chief Complaint:  Patient is a 79y old  Female who presents with a chief complaint of Worsening R hip pain (28 Jan 2025 12:10)    HPI:  79F w/ hx of ETOH in past, PAD w/ b/l carotid artery stenosis, HFpEF EF 64% w/ severe AS, COPD, HTN, CAD, RLE DVT 8/2023 w/ hx of R hip infection p/w worsening R hip pain. Patient is poor historian, not answering all questions she is being asked, continued requests for pain medication. Patient endorses being admitted to [facility] for multiple days for hip pain. States she did not receive antibiotics. Cannot specify nature of her admission. States she fell in front of [employer] before [place] admission but not since. States she was able to bear weight after leaving hospital around 3 days ago but pain has progressively worsened and does not think she can ambulate now. Denies fevers, chills or additional trauma. States she does not remember her medications and is not compliant with many of them outside of hypertension.       Current Pain Score: 10/10    Current out- patient pain regimen: none- a few small scripts for oxy 5 mg and oxy 10 mg    Out Patient Pain Management provider: none    [facility] Prescription Monitoring Program: Reference #[id number]      PAST MEDICAL & SURGICAL HISTORY:  Hypertension      Hyperlipidemia      CAD (coronary artery disease)      Migraine      Anxiety      Emphysema, unspecified      HTN (hypertension)      Anxiety      Coronary artery disease      Stented coronary artery      Seizure      Alcohol abuse      Migraine      Pain of right hip joint      MRSA bacteremia      Essential hypertension      CAD (coronary artery disease)      Elevated brain natriuretic peptide (BNP) level      S/P bladder repair      Status post total hip replacement, right  5/21/18      History of arthroplasty of right hip        FAMILY HISTORY:  Family history of coronary artery disease in daughter (Child)    Family history of essential hypertension      SOCIAL HISTORY:  Tobacco Use: daily smoker  Alcohol Use: + EtOH abuse  Recreational Marijuana: denies  Illicit Drug Use: denies    Opioid Risk Tool (ORT-OUD) Score: high      Allergies    No Known Allergies    Intolerances        REVIEW OF SYSTEMS:  CONSTITUTIONAL: No fever, weight loss, fatigue, falls  NEURO: No headaches, memory loss, tremors, dizziness or blurred vision  RESP: No shortness of breath, cough  CV: No chest pain, palpitations  GI: No abdominal pain, nausea, vomiting, diarrhea, constipation   : + urinary incontinence; no retention, dysuria  MSK: + B/L hip pain; No neck or back pain; + B/L LE pain; no upper or lower motor strength weakness   SKIN: No itching, burning, rashes   PSYCHIATRIC: No depression, anxiety, mood swings, or difficulty sleeping      MEDICATIONS  (STANDING):  albuterol/ipratropium for Nebulization 3 milliLiter(s) Nebulizer every 6 hours  atorvastatin 40 milliGRAM(s) Oral at bedtime  cloNIDine 0.1 milliGRAM(s) Oral every 12 hours  doxycycline monohydrate Capsule 100 milliGRAM(s) Oral every 12 hours  fluticasone propionate/ salmeterol 250-50 MICROgram(s) Diskus 1 Dose(s) Inhalation two times a day  furosemide   Injectable 20 milliGRAM(s) IV Push two times a day  gabapentin 400 milliGRAM(s) Oral two times a day  HYDROmorphone  Injectable 0.5 milliGRAM(s) IV Push once  morphine ER Tablet 15 milliGRAM(s) Oral every 12 hours  polyethylene glycol 3350 17 Gram(s) Oral daily  rivaroxaban 2.5 milliGRAM(s) Oral two times a day  senna 2 Tablet(s) Oral at bedtime  thiamine 100 milliGRAM(s) Oral daily  tiotropium 2.5 MICROgram(s) Inhaler 2 Puff(s) Inhalation daily    MEDICATIONS  (PRN):  acetaminophen     Tablet .. 650 milliGRAM(s) Oral every 6 hours PRN Temp greater or equal to 38C (100.4F), Mild Pain (1 - 3)  melatonin 3 milliGRAM(s) Oral at bedtime PRN Insomnia  nicotine  Polacrilex Gum 4 milliGRAM(s) Oral four times a day PRN Smoking Cessation  ondansetron Injectable 4 milliGRAM(s) IV Push every 8 hours PRN Nausea and/or Vomiting  oxyCODONE    IR 5 milliGRAM(s) Oral every 4 hours PRN Severe Pain (7 - 10)      PHYSICAL EXAM  GENERAL: seen at bedside; moderate distress; well developed, well groomed   HEENT: head atraumatic, normocephalic; anicteric; speech clear and fluent  NEURO: A + O X 3; good concentration; Cranial Nerves II- XII intact; SILT  GI: abdomen soft, non distended; + bowel sounds; flatus:  BM  : no CVA tenderness; no pain on palpation of bladder; voiding  EXTREMITIES/ PV: REECE equally; 2+ peripheral pulses; no cyanosis  or clubbing: + slight LE edema  MUSCULOSKELETAL: motor strength 5/5 B/L LE; decreased B/L hip ROM  SKIN: no rashes, lesions; B/L LE reddened   PSYCH: affect normal; good eye contact; no signs of depression or anxiety  Vital Signs:  T(C): 36.6 (01-28-25 @ 11:17)  HR: 61 (01-28-25 @ 11:17)  BP: 179/78 (01-28-25 @ 11:17)  RR: 18 (01-28-25 @ 11:17)  SpO2: 96% (01-28-25 @ 11:17)    Pertinent labs/radiology:                        11.1   5.81  )-----------( 225      ( 27 Jan 2025 22:26 )             36.0     01-27    141  |  103  |  29[H]  ----------------------------<  189[H]  4.7   |  29  |  1.08    Ca    8.4      27 Jan 2025 22:26  Mg     2.1     01-27    TPro  6.7  /  Alb  2.9[L]  /  TBili  0.3  /  DBili  x   /  AST  15  /  ALT  11  /  AlkPhos  109  01-27    < from: Xray Hip w/ Pelvis 2 or 3 Views, Right (01.27.25 @ 23:18) >  IMPRESSION:  1.  Right longstem femoral revision hardware with lucency surrounding the   hardware and marked subsidence in keeping with loosening.  2.  Lucency at the lateral cortex of the proximal femur may reflect   postoperative change versus osteolysis.  3.  Pseudoarticulation between the proximal femur and right iliac bone

## 2025-01-28 NOTE — PATIENT PROFILE ADULT - NSPRESCRALCFREQ_GEN_A_NUR
pt admits to drinking states only at parties declining to provide information on frequency and types of drinks usually consumed.

## 2025-01-28 NOTE — PHYSICAL THERAPY INITIAL EVALUATION ADULT - PERTINENT HX OF CURRENT PROBLEM, REHAB EVAL
As per ortho consult 1/28, pt is a 79 y/oF underwent R USAMA with Dr. Jaime in 2018, ORIF R hip and revision R USAMA by Dr. Be in 2018 for periprosthetic fx, revision R USAMA by Dr. Be in 2019 for R hip PJI, with known R hip PJI and last hip aspiration cx in June 2024 positive for MRSA, presenting w chronic R hip pain. Pt has been on chronic suppressive antibiotics but notes that she stopped taking her doxycycline 3 days ago and has had increased pain for the past 2 days. Notes that the pain in her R hip is better or worse at times, and this current episode is no change from her normal flare ups of pain. States that because of the pain in her R hip, she has not been ambulating for the past 2 days. Normally walks w RW. Denies any recent trauma. Denies any recent fever/chills. Has pmhx ETOH use, CAD, COPD, HTN, HLD, DVT, PAD, CHF, seizure disorder. During patient's last admission in June 2024, Dr. Bailey spoke with the patient extensively about a R hip girdlestone resection arthroplasty, the surgical procedure to address her R hip chronic PJI. However, the patient refused surgery at the time. This surgical option was again discussed today with the patient, and she once again refused. No orthopedic intervention. WBAT RLE. As per ortho consult 1/28, pt is a 79 y/oF underwent R USAMA with Dr. Jaime in 2018, ORIF R hip and revision R USAMA by Dr. Be in 2018 for periprosthetic fx, revision R USAMA by Dr. Be in 2019 for R hip PJI, with known R hip PJI and last hip aspiration cx in June 2024 positive for MRSA, presenting w chronic R hip pain. Pt has been on chronic suppressive antibiotics but notes that she stopped taking her doxycycline 3 days ago and has had increased pain for the past 2 days. Notes that the pain in her R hip is better or worse at times, and this current episode is no change from her normal flare ups of pain. States that because of the pain in her R hip, she has not been ambulating for the past 2 days. Normally walks w RW. Denies any recent trauma. Denies any recent fever/chills. Has pmhx ETOH use, CAD, COPD, HTN, HLD, DVT, PAD, CHF, seizure disorder. During patient's last admission in June 2024, Dr. Bailey spoke with the patient extensively about a R hip girdlestone resection arthroplasty, the surgical procedure to address her R hip chronic PJI. However, the patient refused surgery at the time. No orthopedic intervention. WBAT RLE.

## 2025-01-29 LAB
D DIMER BLD IA.RAPID-MCNC: 1173 NG/ML DDU — HIGH
LEGIONELLA AG UR QL: NEGATIVE — SIGNIFICANT CHANGE UP

## 2025-01-29 PROCEDURE — 73723 MRI JOINT LWR EXTR W/O&W/DYE: CPT | Mod: 26,RT

## 2025-01-29 PROCEDURE — 71275 CT ANGIOGRAPHY CHEST: CPT | Mod: 26

## 2025-01-29 PROCEDURE — 73610 X-RAY EXAM OF ANKLE: CPT | Mod: 26,RT

## 2025-01-29 RX ORDER — CEFEPIME 2 G/20ML
500 INJECTION, POWDER, FOR SOLUTION INTRAVENOUS EVERY 12 HOURS
Refills: 0 | Status: DISCONTINUED | OUTPATIENT
Start: 2025-01-29 | End: 2025-01-31

## 2025-01-29 RX ORDER — ACETAMINOPHEN 500 MG/5ML
1000 LIQUID (ML) ORAL ONCE
Refills: 0 | Status: COMPLETED | OUTPATIENT
Start: 2025-01-29 | End: 2025-01-29

## 2025-01-29 RX ORDER — COLLAGENASE CLOSTRIDIUM HIST. 250 UNIT/G
1 OINTMENT (GRAM) TOPICAL DAILY
Refills: 0 | Status: DISCONTINUED | OUTPATIENT
Start: 2025-01-29 | End: 2025-02-17

## 2025-01-29 RX ADMIN — ATORVASTATIN CALCIUM 40 MILLIGRAM(S): 80 TABLET, FILM COATED ORAL at 20:53

## 2025-01-29 RX ADMIN — Medication 1000 MILLIGRAM(S): at 03:00

## 2025-01-29 RX ADMIN — OXYCODONE HYDROCHLORIDE 5 MILLIGRAM(S): 30 TABLET ORAL at 12:07

## 2025-01-29 RX ADMIN — FUROSEMIDE 20 MILLIGRAM(S): 10 INJECTION INTRAMUSCULAR; INTRAVENOUS at 17:22

## 2025-01-29 RX ADMIN — CEFEPIME 100 MILLIGRAM(S): 2 INJECTION, POWDER, FOR SOLUTION INTRAVENOUS at 17:21

## 2025-01-29 RX ADMIN — Medication 100 MILLIGRAM(S): at 17:20

## 2025-01-29 RX ADMIN — OXYCODONE HYDROCHLORIDE 5 MILLIGRAM(S): 30 TABLET ORAL at 20:53

## 2025-01-29 RX ADMIN — Medication 15 MILLIGRAM(S): at 06:06

## 2025-01-29 RX ADMIN — OXYCODONE HYDROCHLORIDE 5 MILLIGRAM(S): 30 TABLET ORAL at 21:40

## 2025-01-29 RX ADMIN — GABAPENTIN 400 MILLIGRAM(S): 400 CAPSULE ORAL at 17:20

## 2025-01-29 RX ADMIN — Medication 100 MILLIGRAM(S): at 06:08

## 2025-01-29 RX ADMIN — Medication 1 DOSE(S): at 06:14

## 2025-01-29 RX ADMIN — OXYCODONE HYDROCHLORIDE 5 MILLIGRAM(S): 30 TABLET ORAL at 16:00

## 2025-01-29 RX ADMIN — Medication 0.1 MILLIGRAM(S): at 17:20

## 2025-01-29 RX ADMIN — GABAPENTIN 400 MILLIGRAM(S): 400 CAPSULE ORAL at 06:07

## 2025-01-29 RX ADMIN — RIVAROXABAN 2.5 MILLIGRAM(S): 10 TABLET, FILM COATED ORAL at 17:22

## 2025-01-29 RX ADMIN — NICOTINE POLACRILEX 4 MILLIGRAM(S): 4 GUM, CHEWING ORAL at 12:08

## 2025-01-29 RX ADMIN — Medication 400 MILLIGRAM(S): at 02:04

## 2025-01-29 RX ADMIN — Medication 15 MILLIGRAM(S): at 17:20

## 2025-01-29 RX ADMIN — Medication 0.1 MILLIGRAM(S): at 06:07

## 2025-01-29 RX ADMIN — Medication 1 DOSE(S): at 17:21

## 2025-01-29 NOTE — DISCHARGE NOTE PROVIDER - NSDCFUADDAPPT_GEN_ALL_CORE_FT
Podiatry Discharge Instructions:  Follow up: Please follow up with Dr. Malagon within 1 week of discharge from the hospital, please call [phone] for appointment and discuss that you recently were seen in the hospital.  Wound Care:  Please apply santyl to right ankle and left heel wound followed by adaptic, 4x4 guaze, and demetrius, daily.   Weight bearing: Please weight bear as tolerated in a surgical shoe.  Antibiotics: Please continue as instructed. APPTS ARE READY TO BE MADE: [X] YES    Best Family or Patient Contact (if needed):    Additional Information about above appointments (if needed):    1: PCP  2: Cardiology  3: pulmonary/HOME pulm  4: ID  5: psychiatry   6: podiatry   7: orthopedics     Other comments or requests:    APPTS ARE READY TO BE MADE: [X] YES    Best Family or Patient Contact (if needed):    Additional Information about above appointments (if needed):    1: PCP  2: Cardiology  3: pulmonary/HOME pulm  4: ID  5: psychiatry   6: podiatry   7: orthopedics     Other comments or requests:     Patient is being discharged to [facility]. Caregiver will arrange follow up.

## 2025-01-29 NOTE — DISCHARGE NOTE PROVIDER - PROVIDER TOKENS
PROVIDER:[TOKEN:[[id number]]],PROVIDER:[TOKEN:[[id number]]],PROVIDER:[TOKEN:[[id number]]],PROVIDER:[TOKEN:[[id number]]] PROVIDER:[TOKEN:[[id number]],FOLLOWUP:[1 week]],PROVIDER:[TOKEN:[[id number]],FOLLOWUP:[1 week]],PROVIDER:[TOKEN:[[id number]],FOLLOWUP:[2 weeks]],PROVIDER:[TOKEN:[[id number]],FOLLOWUP:[1 week]]

## 2025-01-29 NOTE — DISCHARGE NOTE PROVIDER - NSDCCAREPROVSEEN_GEN_ALL_CORE_FT
La Nena Perez Kevan Perez  Shadijina Stone Domenico Orta  [facility] Center[facility]ela Lawrence  Ordering Physician  Chaka Cevallos  [facility]      [ Greater than 35 min spent for discharge services.   MARIZOL Perez ]       ( Note written / Date of service is the same as last day of patient stay  in the hospital ( for billing purposes)))

## 2025-01-29 NOTE — DISCHARGE NOTE PROVIDER - NSDCCPCAREPLAN_GEN_ALL_CORE_FT
PRINCIPAL DISCHARGE DIAGNOSIS  Diagnosis: Right hip pain  Assessment and Plan of Treatment: offered arthroplasty by ortho- you have declined procedure  follow up with ortho as outpatient  weight bearing as tolerated  continue with medications as prescribed      SECONDARY DISCHARGE DIAGNOSES  Diagnosis: Chronic heart failure with preserved ejection fraction (HFpEF)  Assessment and Plan of Treatment: Weigh yourself daily.  If you gain 3lbs in 3 days, or 5lbs in a week call your Health Care Provider.  Do not eat or drink foods containing more than 2000mg of salt (sodium) in your diet every day.  Call your Health Care Provider if you have any swelling or increased swelling in your feet, ankles, and/or stomach.  Take all of your medication as directed.  If you become dizzy call your Health Care Provider.  follow up with cardiology outpatient    Diagnosis: COPD, moderate  Assessment and Plan of Treatment: on 5L nasal cannula- continue to wean off O2 as able (keep sat >90%)   if worsening symptoms or shortness of breath, contact provider  follow up with pulmonary outpatient  CTA chest negative for pulmonary embolism, noted for RUL opacities- continue with antibiotics as prescribed    Diagnosis: Wound of right ankle  Assessment and Plan of Treatment: status post MRI R ankle  continue with antibiotics as prescribed  Podiatry Discharge Instructions:  Follow up: Please follow up with Dr. Malagon within 1 week of discharge from the hospital, please call [phone] for appointment and discuss that you recently were seen in the hospital.  Wound Care:  Please apply santyl to right ankle and left heel wound followed by adaptic, 4x4 guaze, and demetrius, daily.   Weight bearing: Please weight bear as tolerated in a surgical shoe.

## 2025-01-29 NOTE — PROGRESS NOTE ADULT - SUBJECTIVE AND OBJECTIVE BOX
Cardiovascular Disease Progress Note    Overnight events: No acute events overnight.  no new cardiac sx  Otherwise review of systems negative    Objective Findings:  T(C): 36.6 (01-29-25 @ 05:00), Max: 37.1 (01-28-25 @ 20:10)  HR: 82 (01-29-25 @ 05:00) (61 - 94)  BP: 157/75 (01-29-25 @ 05:00) (141/74 - 179/78)  RR: 18 (01-29-25 @ 05:00) (18 - 18)  SpO2: 94% (01-29-25 @ 05:00) (94% - 98%)  Wt(kg): --  Daily     Daily       Physical Exam:  Gen: NAD  HEENT: EOMI  CV: RRR, normal S1 + S2, no m/r/g  Lungs: CTAB  Abd: soft, non-tender  Ext: No edema    Telemetry:    Laboratory Data:                        11.1   5.81  )-----------( 225      ( 27 Jan 2025 22:26 )             36.0     01-27    141  |  103  |  29[H]  ----------------------------<  189[H]  4.7   |  29  |  1.08    Ca    8.4      27 Jan 2025 22:26  Mg     2.1     01-27    TPro  6.7  /  Alb  2.9[L]  /  TBili  0.3  /  DBili  x   /  AST  15  /  ALT  11  /  AlkPhos  109  01-27    PT/INR - ( 27 Jan 2025 16:35 )   PT: 15.4 sec;   INR: 1.35 ratio         PTT - ( 27 Jan 2025 16:35 )  PTT:36.9 sec          Inpatient Medications:  MEDICATIONS  (STANDING):  albuterol/ipratropium for Nebulization 3 milliLiter(s) Nebulizer every 6 hours  atorvastatin 40 milliGRAM(s) Oral at bedtime  cloNIDine 0.1 milliGRAM(s) Oral every 12 hours  doxycycline monohydrate Capsule 100 milliGRAM(s) Oral every 12 hours  fluticasone propionate/ salmeterol 250-50 MICROgram(s) Diskus 1 Dose(s) Inhalation two times a day  furosemide   Injectable 20 milliGRAM(s) IV Push two times a day  gabapentin 400 milliGRAM(s) Oral two times a day  morphine ER Tablet 15 milliGRAM(s) Oral every 12 hours  polyethylene glycol 3350 17 Gram(s) Oral daily  rivaroxaban 2.5 milliGRAM(s) Oral two times a day  senna 2 Tablet(s) Oral at bedtime  thiamine 100 milliGRAM(s) Oral daily  tiotropium 2.5 MICROgram(s) Inhaler 2 Puff(s) Inhalation daily      Assessment:  79F w/ hx of ETOH in past, PAD w/ b/l carotid artery stenosis, HFpEF EF 64% w/ severe AS, COPD, HTN, CAD, RLE DVT 8/2023 w/ hx of R hip infection p/w worsening R hip pain and hypoxic resp failure    Recs:  cardiac stable  no e/o acs  cw antiplatelet, statin and antianginals for cad/stent. denies chest pain. hold off on ischemic eval for now   vol status improving = cw lasix to 20mg iv bid. gentle diuresis as patient is preload dependent in setting of AS  s/p echo, results noted. mod to severe AS. doubt would be a TAVR candidate in setting of chronic hip infection and risk of endocarditis  suggest pulmonary consult to assist with tx of copd  cw xarelto 2.5mg bid for PAD dosing and "ppx for hx of VTE"   pain control  f/u ortho recs  will follow          Over 25 minutes spent on total encounter; more than 50% of the visit was spent counseling and/or coordinating care by the attending physician.      Chaka Lawrence MD   Cardiovascular Disease  [phone]

## 2025-01-29 NOTE — DIETITIAN INITIAL EVALUATION ADULT - PHYSCIAL ASSESSMENT
- [facility] weight trend history not available     - Per RD note from previous admission (6/14/2024), Patient's weight documented to be 115.1lbs. Per electronic medical record, current dosing weight 140lbs (1/27/2025). 22% weight gain x 7 months indicated, question accuracy.     - Patient confirmed dosing weight to be accurate, and denies recent unintentional weight loss. Per electronic medical record, height documented to be 5'1", Patient denies and reports to be 5'2".    - Heart failure, diuretics in use, and edema noted during admission. Weight fluctuations to be expected, continue to monitor.

## 2025-01-29 NOTE — DIETITIAN INITIAL EVALUATION ADULT - PROBLEM SELECTOR PLAN 2
TTE w/ EF 64%, no wall motion abnormalities, mild diastolic dysfunction, severe AS during recent admission. Note significant LE edema and erythema bilaterally concerning for venous stasis w/ possible but less likely cellulitis. Dopplers negative for DVT. Patient has not been taking lasix at home, does not want to take overnight. Might reconsider when premafit available but not tonight.    -Try Lasix 40mg in AM  -Daily weight and I/O  -F/u inflammatory markers  -F/u BNP

## 2025-01-29 NOTE — DISCHARGE NOTE PROVIDER - NSFOLLOWUPCLINICS_GEN_ALL_ED_FT
[facility] Orthopedic Surgery  Orthopedic Surgery  [address], 3rd & 4th [place] [address]  Phone: [phone]  Fax:     [facility] Psychiatry  Psychiatry  [address] [place] [address]  Phone: [phone]  Fax:     [facility]  Pulmonary  [place]   Phone:   Fax:   Follow Up Time: 1 week     [facility]  Pulmonary  [place]   Phone:   Fax:   Follow Up Time: 1-3 days    [facility] Orthopedic Surgery  Orthopedic Surgery  [address], 3rd & 4th [place] [address]  Phone: [phone]  Fax:   Follow Up Time: 1 week    [facility] Psychiatry  Psychiatry  [address] [place] [address]  Phone: [phone]  Fax:   Follow Up Time: 1 week

## 2025-01-29 NOTE — DIETITIAN INITIAL EVALUATION ADULT - LITERATURE/VIDEOS GIVEN
Reviewed the importance of high-protein foods to optimize nutrition. Patient demonstrated a fair-level of understanding. RD remains available should additional diet education be indicated.

## 2025-01-29 NOTE — DIETITIAN INITIAL EVALUATION ADULT - OTHER INFO
Pertinent History:   - Per H&P: "poor historian", ETOH abuse  - Per electronic medical record, chronic heart failure noted during admission     Pertinent Nutrition Related Labs:  1/27 labs reviewed  - BUN 29 (H), GFR 53 (L)  - Glucose 189 (H), indicating hyperglycemia  - BNP 9,477 (H) 1/27/2025    Pertinent Medications:   - Antibiotics in use  - Diuretics in use, weight fluctuations to be expected   - Thiamine ordered

## 2025-01-29 NOTE — DISCHARGE NOTE PROVIDER - HOSPITAL COURSE
Pt presented with right hip pain, endorsing severe R hip pain w/o inciting fall or traumatic event. 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 06/2024 admission with possible joint infection. No clear signs of infection currently and exam is equivocal. Pt ordered for pain control, evaluated by chronic pain. Evaled by ortho, offered arthroplasty, pt refusing, no further inpt ortho intvn, can follow up outpatient. Pt also found to have R lateral malleolus wound to bone- MRI ankle susceptible to OM. Ortho rec no sx intvn, started on cefepime and bactrim,  changed to PO levaquin through 3/13 and bactrim for chronic suppression of R hip PJI. Pts course c/b acute exacerbation of Chronic heart failure with preserved ejection fraction (HFpEF), edema, elevated BNP, also w/ hx severe AS. Evaled by cardiology, s/p IV lasix now on PO.         CTA and Duplex negative for VTE      Problem/Plan - 3:  ·  Problem: COPD   ? has 02 PRN at home. Patient smoking tobacco >> quitting   -Duonebs Q6hrs  -Cont. Symbicort BID  -Cont. Spiriva  - pulm appreciated     ## pneumonia on CT as above >> post antibiotics >> monitor     lung sounds and breathing better      Problem/Plan - 4:  ·  Problem: CAD (coronary artery disease).   ·  Plan: Patient states she does not take aspirin or atorvastatin at home.  cardio following, appreciated     Problem/Plan - 5:  ·  Problem: EtOH dependence.   no signs of withdrawal      Problem/Plan - 6:  ·  Problem: history of Severe aortic stenosis.   repeat echo as above : unchanged   cardio appreciated     Problem/Plan - 7:  ·  Problem: Essential hypertension.   ·  Plan: -Trend BP  -Cont. Clonidine BID  -Patient does not recall taking Nifedipine, can resume prior dose if BPs uncontrolled.     Problem/Plan - 8:  ·  Problem: Prophylactic measure.   ·  Plan: DVT PPx  xarelto HPI:  79F w/ hx of ETOH in past, PAD w/ b/l carotid artery stenosis, HFpEF EF 64% w/ severe AS, COPD, HTN, CAD, RLE DVT 8/2023 w/ hx of R hip infection p/w worsening R hip pain. Patient is poor historian, not answering all questions she is being asked, continued requests for pain medication. Patient endorses being admitted to [facility] for multiple days for hip pain. States she did not receive antibiotics. Cannot specify nature of her admission. States she fell in front of [employer] before [place] admission but not since. States she was able to bear weight after leaving hospital around 3 days ago but pain has progressively worsened and does not think she can ambulate now. Denies fevers, chills or additional trauma. States she does not remember her medications and is not compliant with many of them outside of hypertension.     Hospital Course:  Pt presented with right hip pain, endorsing severe R hip pain w/o inciting fall or traumatic event. 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 06/2024 admission with possible joint infection. No clear signs of infection currently and exam is equivocal. Pt ordered for pain control, evaluated by chronic pain. Evaled by ortho, offered arthroplasty, pt refusing, no further inpt ortho intvn, can follow up outpatient. Pt also found to have R lateral malleolus wound to bone- MRI ankle susceptible to OM. Ortho rec no sx intvn, started on cefepime and bactrim,  changed to PO levaquin through 3/13 and bactrim for chronic suppression of R hip PJI. Pts course c/b acute exacerbation of Chronic heart failure with preserved ejection fraction (HFpEF), edema, elevated BNP, also w/ hx severe AS. Evaled by cardiology, s/p IV lasix, dc'd given LAI. CTA and Duplex negative for VTE. Pt has COPD, evaled by pulmonary. CTA seen with RUL opacities c/f PNA, started on cefepime as above. Remains on 5L NC. Seen by psychiatry on this admission for capacity eval on medical decision making, unable to comment given fixation on pain, likely lacks decisional capacity to refuse critical care. Outpatient followup.     Important Medication Changes and Reason:  - see med rec    Active or Pending Issues Requiring Follow-up:  - PCP, ID, cardiology, psychiatry, pulmonary, orthopedics, podiatry    Advanced Directives:   [X] Full code  [ ] DNR  [ ] Hospice    Discharge Diagnoses:  - R hip pain/hx prosthetic joint infection  - R lateral malleolus wound  - RUL opacities  -hypoxia  - CHF   - LE edema        Discharge/Dispo/Med rec discussed with attending Dr. Perez. Patient medically cleared for discharge [facility] with outpatient follow up with PCP, ID, cardiology, psychiatry, pulmonary, orthopedics, podiatry                HPI:  79F w/ hx of ETOH in past, PAD w/ b/l carotid artery stenosis, HFpEF EF 64% w/ severe AS, COPD, HTN, CAD, RLE DVT 8/2023 w/ hx of R hip infection p/w worsening R hip pain. Patient is poor historian, not answering all questions she is being asked, continued requests for pain medication. Patient endorses being admitted to [facility] for multiple days for hip pain. States she did not receive antibiotics. Cannot specify nature of her admission. States she fell in front of [employer] before [place] admission but not since. States she was able to bear weight after leaving hospital around 3 days ago but pain has progressively worsened and does not think she can ambulate now. Denies fevers, chills or additional trauma. States she does not remember her medications and is not compliant with many of them outside of hypertension.     Hospital Course:  Pt presented with right hip pain, endorsing severe R hip pain w/o inciting fall or traumatic event. 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 06/2024 admission with possible joint infection. No clear signs of infection currently and exam is equivocal. Pt ordered for pain control, evaluated by chronic pain. Evaled by ortho, offered arthroplasty, pt refusing, no further inpt ortho intvn, can follow up outpatient. Pt also found to have R lateral malleolus wound to bone- MRI ankle susceptible to OM. Ortho rec no sx intvn, started on cefepime and bactrim,  changed to PO levaquin through 3/13 and bactrim for chronic suppression of R hip PJI. Pts course c/b acute exacerbation of Chronic heart failure with preserved ejection fraction (HFpEF), edema, elevated BNP, also w/ hx severe AS. Evaled by cardiology, s/p IV lasix, dc'd given LAI. CTA and Duplex negative for VTE. Pt has COPD, evaled by pulmonary. CTA seen with RUL opacities c/f PNA, started on cefepime as above. Remains on 4L NC. Seen by psychiatry on this admission for capacity eval on medical decision making, unable to comment given fixation on pain, likely lacks decisional capacity to refuse critical care. Outpatient followup. Hyperkalemia in the setting of bactrim, resolved with lokelma. No concentrated K diet, discharge on Lactulose 10G daily, monitor BMP at rehab.     Discharge/dispo/med rec discussed with Dr. Perez who determined patient stable and medically cleared for discharge to [facility] today with outpatient follow up for continued management and care.     Important Medication Changes and Reason:  - see med rec    Active or Pending Issues Requiring Follow-up:  - Monitor for recurrence of Hyperkalemia with BMP on 2/17/25   - PCP, ID, cardiology, psychiatry, pulmonary, orthopedics, podiatry    Advanced Directives:   [X] Full code  [ ] DNR  [ ] Hospice    Discharge Diagnoses:  - R hip pain/hx prosthetic joint infection  - R lateral malleolus wound  - RUL opacities  -hypoxia  - CHF   - LE edema        Discharge/Dispo/Med rec discussed with attending Dr. Perez. Patient medically cleared for discharge [facility] with outpatient follow up with PCP, ID, cardiology, psychiatry, pulmonary, orthopedics, podiatry                HPI:  79F w/ hx of ETOH in past, PAD w/ b/l carotid artery stenosis, HFpEF EF 64% w/ severe AS, COPD, HTN, CAD, RLE DVT 8/2023 w/ hx of R hip infection p/w worsening R hip pain. Patient is poor historian, not answering all questions she is being asked, continued requests for pain medication. Patient endorses being admitted to [facility] for multiple days for hip pain. States she did not receive antibiotics. Cannot specify nature of her admission. States she fell in front of [employer] before [place] admission but not since. States she was able to bear weight after leaving hospital around 3 days ago but pain has progressively worsened and does not think she can ambulate now. Denies fevers, chills or additional trauma. States she does not remember her medications and is not compliant with many of them outside of hypertension.     Hospital Course:  Pt presented with right hip pain, endorsing severe R hip pain w/o inciting fall or traumatic event. 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 06/2024 admission with possible joint infection. No clear signs of infection currently and exam is equivocal. Pt ordered for pain control, evaluated by chronic pain. Evaled by ortho, offered arthroplasty, pt refusing, no further inpt ortho intvn, can follow up outpatient. Pt also found to have R lateral malleolus wound to bone- MRI ankle susceptible to OM. Ortho rec no sx intvn, started on cefepime and bactrim,  changed to PO levaquin through 3/13 and bactrim for chronic suppression of R hip PJI. Pts course c/b acute exacerbation of Chronic heart failure with preserved ejection fraction (HFpEF), edema, elevated BNP, also w/ hx severe AS. Evaled by cardiology, s/p IV lasix, dc'd given LAI. CTA and Duplex negative for VTE. Pt has COPD, evaled by pulmonary. CTA seen with RUL opacities c/f PNA, started on cefepime as above. Remains on 4L NC. Seen by psychiatry on this admission for capacity eval on medical decision making, unable to comment given fixation on pain, likely lacks decisional capacity to refuse critical care. Outpatient followup. Hyperkalemia in the setting of bactrim, resolved with lokelma. No concentrated K diet, discharge on Lactulose 10G daily, monitor BMP at rehab.     Discharge/dispo/med rec discussed with Dr. Perez who determined patient stable and medically cleared for discharge to [facility] today with outpatient follow up for continued management and care.     Important Medication Changes and Reason:  - see med rec    Active or Pending Issues Requiring Follow-up:  - Monitor for recurrence of Hyperkalemia with BMP on 2/18/25   - PCP, ID, cardiology, psychiatry, pulmonary, orthopedics, podiatry    Advanced Directives:   [X] Full code  [ ] DNR  [ ] Hospice    Discharge Diagnoses:  - R hip pain/hx prosthetic joint infection  - R lateral malleolus wound  - RUL opacities  -hypoxia  - CHF   - LE edema        Discharge/Dispo/Med rec discussed with attending Dr. Perez. Patient medically cleared for discharge [facility] with outpatient follow up with PCP, ID, cardiology, psychiatry, pulmonary, orthopedics, podiatry                HPI:  79F w/ hx of ETOH in past, PAD w/ b/l carotid artery stenosis, HFpEF EF 64% w/ severe AS, COPD, HTN, CAD, RLE DVT 8/2023 w/ hx of R hip infection p/w worsening R hip pain. Patient is poor historian, not answering all questions she is being asked, continued requests for pain medication. Patient endorses being admitted to [facility] for multiple days for hip pain. States she did not receive antibiotics. Cannot specify nature of her admission. States she fell in front of [employer] before [place] admission but not since. States she was able to bear weight after leaving hospital around 3 days ago but pain has progressively worsened and does not think she can ambulate now. Denies fevers, chills or additional trauma. States she does not remember her medications and is not compliant with many of them outside of hypertension.     Hospital Course:  Pt presented with right hip pain, endorsing severe R hip pain w/o inciting fall or traumatic event. 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 06/2024 admission with possible joint infection. No clear signs of infection currently and exam is equivocal. Pt ordered for pain control, evaluated by chronic pain. Evaled by ortho, offered arthroplasty, pt refusing, no further inpt ortho intvn, can follow up outpatient. Pt also found to have R lateral malleolus wound to bone- MRI ankle susceptible to OM. Ortho rec no sx intvn, started on cefepime and bactrim,  changed to PO levaquin and bactrim through 3/13, followed by bactrim for chronic suppression of R hip PJI. Pts course c/b acute exacerbation of Chronic heart failure with preserved ejection fraction (HFpEF), edema, elevated BNP, also w/ hx severe AS. Evaled by cardiology, s/p IV lasix, dc'd given LAI. CTA and Duplex negative for VTE. Pt has COPD, evaled by pulmonary. CTA seen with RUL opacities c/f PNA, started on cefepime as above. Remains on 4L NC. Seen by psychiatry on this admission for capacity eval on medical decision making, unable to comment given fixation on pain, likely lacks decisional capacity to refuse critical care. Outpatient followup. Hyperkalemia in the setting of bactrim, resolved with lokelma/lactulose. No concentrated K diet, discharge on Lactulose 10G daily, monitor BMP at rehab.     Discharge/dispo/med rec discussed with Dr. Perez who determined patient stable and medically cleared for discharge to [facility] today with outpatient follow up for continued management and care.     Important Medication Changes and Reason:  - see med rec    Active or Pending Issues Requiring Follow-up:  - Monitor for recurrence of Hyperkalemia with BMP 3x/week during treatment with Bactrim DS q 8hrs until END DATE 3/13/25, then routine lab monitoring thereafter.   - PCP, ID, cardiology, psychiatry, pulmonary, orthopedics, podiatry    Advanced Directives:   [X] Full code  [ ] DNR  [ ] Hospice    Discharge Diagnoses:  - R hip pain/hx prosthetic joint infection  - R lateral malleolus wound  - RUL opacities  -hypoxia  - CHF   - LE edema

## 2025-01-29 NOTE — PROGRESS NOTE ADULT - ASSESSMENT
78 y/o F w/ R lateral malleolus wound to capsule  - Pt seen and evaluated  - Afebrile, WBC 5.81  - R lateral malleolus wound to level of bone, fibrogranular wound bed, indurated borders, well circumscribed, no malodor or purulence. Right heel wound to the level of subq, no acute acute signs of infection.   - Ordered right ankle xray for evaluation of lateral malleolus OM  - Recommended Vasc recs   - Wound care orders for santyl placed by nursing   - STRICT decubitus precautions, Z- FLOWS boots at all time when in bed and in chair resting.   - No acute podiatric surgical intervention secondary to no acute signs of infection.   - Pod plan: Patient is stable for discharge from a podiatry standpoint. Patient can follow up outpatient with Dr. Garcia/V[place]  - Wound care instructions and followup information in discharge paperwork.   - Discussed with  attending.

## 2025-01-29 NOTE — PROGRESS NOTE ADULT - ASSESSMENT
_________________________________________________________________________________________  ========>>  M E D I C A L   A T T E N D I N G    F O L L O W  U P  N O T E  <<=========  -----------------------------------------------------------------------------------------------------    - Patient seen and examined by me earlier today.   - In summary,  DAQUAN MARTINEZ is a 79y year old woman admitted with hip pain   - Patient today overall doing failrly, still complains of a ot of pain.. patient reprotedly declining other treatments including nebs, diuretics...      patient with spisodes of hypoxemia yesterday > CTA and dulex as bellow.. (elevated Dimer) negative for PE > starting on antibiotics  for pneumonia     ==================>> REVIEW OF SYSTEM <<=================    GEN: no fever, no chills, pain in right hip area..   RESP: no SOB, no cough, no sputum  CVS: no chest pain, no palpitations  GI: no abdominal pain, no nausea  : no dysuria, no frequency  Neuro: no headache, no dizziness    ==================>> PHYSICAL EXAM <<=================    GEN: A&O X 3 , NAD , comfortable, pleasant, calm ..   HEENT: NCAT, PERRL, MMM, hearing intact  CVS: S1S2 , regular , No M/R/G appreciated  PULM: scattered wheez   ABD.: soft. non tender, non distended,  Extrem: intact pulses , leg edema and stasis changes        ( Note written / Date of service 01-29-25 ( This is certified to be the same as "ENTERED" date above ( for billing purposes)))    ==================>> MEDICATIONS <<====================    albuterol/ipratropium for Nebulization 3 milliLiter(s) Nebulizer every 6 hours  atorvastatin 40 milliGRAM(s) Oral at bedtime  cefepime   IVPB 500 milliGRAM(s) IV Intermittent every 12 hours  cloNIDine 0.1 milliGRAM(s) Oral every 12 hours  doxycycline monohydrate Capsule 100 milliGRAM(s) Oral every 12 hours  fluticasone propionate/ salmeterol 250-50 MICROgram(s) Diskus 1 Dose(s) Inhalation two times a day  furosemide   Injectable 20 milliGRAM(s) IV Push two times a day  gabapentin 400 milliGRAM(s) Oral two times a day  morphine ER Tablet 15 milliGRAM(s) Oral every 12 hours  polyethylene glycol 3350 17 Gram(s) Oral daily  rivaroxaban 2.5 milliGRAM(s) Oral two times a day  senna 2 Tablet(s) Oral at bedtime  thiamine 100 milliGRAM(s) Oral daily  tiotropium 2.5 MICROgram(s) Inhaler 2 Puff(s) Inhalation daily    MEDICATIONS  (PRN):  acetaminophen     Tablet .. 650 milliGRAM(s) Oral every 6 hours PRN Temp greater or equal to 38C (100.4F), Mild Pain (1 - 3)  melatonin 3 milliGRAM(s) Oral at bedtime PRN Insomnia  naloxone Injectable 0.2 milliGRAM(s) IV Push every 3 minutes PRN respiratory depression/ suspected opioid OD  nicotine  Polacrilex Gum 4 milliGRAM(s) Oral four times a day PRN Smoking Cessation  ondansetron Injectable 4 milliGRAM(s) IV Push every 8 hours PRN Nausea and/or Vomiting  oxyCODONE    IR 5 milliGRAM(s) Oral every 4 hours PRN Severe Pain (7 - 10)    ___________  Active diet:  Diet, DASH/TLC:   Sodium & Cholesterol Restricted  ___________________    ==================>> VITAL SIGNS <<==================    Height (cm): 154.9  Weight (kg): 63.5  BMI (kg/m2): 26.5  Vital Signs Last 24 HrsT(C): 36.6 (01-29-25 @ 05:00)  T(F): 97.8 (01-29-25 @ 05:00), Max: 98.8 (01-28-25 @ 20:10)  HR: 82 (01-29-25 @ 05:00) (62 - 94)  BP: 157/75 (01-29-25 @ 05:00)  RR: 18 (01-29-25 @ 05:00) (18 - 18)  SpO2: 94% (01-29-25 @ 05:00) (94% - 98%)       ==================>> LAB AND IMAGING <<==================                        11.1   5.81  )-----------( 225      ( 27 Jan 2025 22:26 )             36.0        01-27    141  |  103  |  29[H]  ----------------------------<  189[H]  4.7   |  29  |  1.08    Ca    8.4      27 Jan 2025 22:26  Mg     2.1     01-27    TPro  6.7  /  Alb  2.9[L]  /  TBili  0.3  /  DBili  x   /  AST  15  /  ALT  11  /  AlkPhos  109  01-27    WBC count:   5.81 <<== ,  6.16 <<==   Hemoglobin:   11.1 <<==,  11.1 <<==  platelets:  225 <==, 253 <==    Creatinine:  1.08  <<==, 0.93  <<==  Sodium:   141  <==, 141  <==       AST:          15(01-27) <== , 24(01-27) <==      ALT:        11(01-27)  <== , 15(01-27)  <==      AP:        109(01-27)  <=, 121(01-27)  <=     Bili:        0.3(01-27)  <=, 0.3(01-27)  <=    ____________________________    M I C R O B I O L O G Y :    Culture - Blood (collected 27 Jan 2025 22:17)  Source: .Blood BLOOD  Preliminary Report (29 Jan 2025 04:01):    No growth at 24 hours    Culture - Blood (collected 27 Jan 2025 22:10)  Source: .Blood BLOOD  Preliminary Report (29 Jan 2025 04:01):    No growth at 24 hours    ^^^ Inflammatory markers :  ^^^  C R P :          53 (01-27-25)  <<--          E S R :        89 (01-27-25)     D-Dimer Assay, Quantitative: 1173: (01.29.25 @ 07:03)    < from: TTE W or WO Ultrasound Enhancing Agent (01.28.25 @ 13:55) >  CONCLUSIONS:    1. Left ventricular cavity is small. Left ventricular wall thickness is normal. Left ventricular systolic function is normal with an ejection fraction of 73 % by Rios's method of disks.   2. Mildly enlarged right ventricular cavity size, with normal wall thickness, and normal right ventricular systolic function.   3. Moderate to severe aortic stenosis.   4. Moderate tricuspid regurgitation.   5. Estimated pulmonary artery systolic pressure is 75 mmHg, consistent with severe pulmonary hypertension.   6. No pericardial effusion seen.   7. Compared to the transthoracic echocardiogram performed on 4/12/2023, there have been no significant interval changes.  < end of copied text >    < from: CT Angio Chest PE Protocol w/ IV Cont (01.29.25 @ 08:47) >  IMPRESSION:  No pulmonary embolism.  Right upper lobe groundglass opacities and right lower lobe nodular   opacities are likely infectious/inflammatory.  Small bilateral pleural effusions.  < end of copied text >    < from: [facility] Duplex Lower Ext Vein Scan, Bilat (01.27.25 @ 20:06) >  IMPRESSION:  No evidence of deep venous thrombosis in either lower extremity.  < end of copied text >    < from: Xray Knee 3 Views, Right (01.27.25 @ 23:18) >  IMPRESSION:  1.  Right longstem femoral revision hardware with lucency surrounding the   hardware and marked subsidence in keeping with loosening.  2.  Lucency at the lateral cortex of the proximal femur may reflect   postoperative change versus osteolysis.  3.  Pseudoarticulation between the proximal femur and right iliac bone  < end of copied text >    < from: [facility] Duplex Lower Ext Vein Scan, Bilat (01.27.25 @ 20:06) >  IMPRESSION:  No evidence of deep venous thrombosis in either lower extremity.  < end of copied text >    < from: TTE W or WO Ultrasound Enhancing Agent (06.17.24 @ 17:12) >  CONCLUSIONS:    1. Left ventricular cavity is normal in size. Left ventricular wall thickness is normal. Left ventricular systolic function is normal with an ejection fraction of 64 % by Rios's method of disks. There are no regional wall motion abnormalities seen.   2. There is mild (grade 1) left ventricular diastolic dysfunction.   3. Normal right ventricular cavity size and normal systolic function.   4. Structurally normal mitral valve with normal leaflet excursion. There is calcification of the mitral valve annulus. There is mild mitral regurgitation.   5. The aortic valve anatomy cannot be determined with reduced systolic excursion. There is calcification of the aortic valve leaflets. There is severe aortic stenosis. The peak transaortic velocity is 3.94 m/s, peak transaortic gradient is 62.1 mmHg and mean transaortic gradient is 32.0 mmHg with an LVOT/aortic valve VTI ratio of 0.22. The aorticvalve area is estimated at 0.51 cm² by the continuity equation. There is mild to moderate aortic regurgitation.   6. The left atrium is mildly dilated with an indexed volume of 41 ml/m².   7. No prior echocardiogram is available for comparison.  < end of copied text >    ___________________________________________________________________________________  ===============>>  A S S E S S M E N T   A N D   P L A N <<===============  ------------------------------------------------------------------------------------------    79F w/ hx of ETOH in past, PAD w/ b/l carotid artery stenosis, HFpEF EF 64% w/ severe AS, COPD, HTN, CAD, RLE DVT 8/2023 w/ hx of R hip infection p/w worsening R hip pain     Problem/Plan - 1:  ·  Problem: right hip pain.   ·  Plan: Patient endorsing severe R hip pain w/o inciting fall or traumatic event. 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 06/2024 admission with possible joint infection. No clear signs of infection currently and exam is equivocal. Given hx, will eval further for infection.   -Pain control with Dilaudid 0.5mg IV Q6hrs PRN severe pain overnight, low threshold for pain consult, as per prior admission there is some substance use history.     >> pain management consult appreciated   -Bowel regimen  -ESR CRP as above   -follow BCxs   - Ortho following      -on chronic suppressive antibiotics    -Trend wbc, fever curve can consider ID consult pending cultures..   - patient may be interested in surgery > ortho to follow up     Problem/Plan - 2:  ·  Problem: acute exacerbation of Chronic heart failure with preserved ejection fraction (HFpEF), edema, elevated BNP     in patient with severe Aortic stenosis as above   repeat TTE as above with AS, MR, PAH  diuretics per cardio   -Daily weight and I/O  cardio following   change to Xarelto 2.5 mg BID per cardio    CTA and Duplex negative for VTE      Problem/Plan - 3:  ·  Problem: COPD  ·  Plan: Notable wheeze on exam on presentation   ? has 02 PRN at home. Patient smoking tobacco, not clarifying how much.  -Duonebs Q6hrs  -Cont. Symbicort BID  -Cont. Spiriva    ## pneumonia on CT as above >> started on antibiotics >> monitor   - pulm evaluation as needed : house     Problem/Plan - 4:  ·  Problem: CAD (coronary artery disease).   ·  Plan: Patient states she does not take aspirin or atorvastatin at home.  cardio following, appreciated     Problem/Plan - 5:  ·  Problem: EtOH dependence.   ·  Plan: Hx of ETOH dependence and substance use in past as per prior notes. Patient denies any recent ETOH use.  -Low threshold to order CIWA  -Will order thiamine for now.     Problem/Plan - 6:  ·  Problem: Severe aortic stenosis.   ·  Plan: hx of severe AS, note LE edema  repeat echo as above : unchanged   cardio appreciated     Problem/Plan - 7:  ·  Problem: Essential hypertension.   ·  Plan: -Trend BP  -Cont. Clonidine BID  -Patient does not recall taking Nifedipine, can resume prior dose if BPs uncontrolled.     Problem/Plan - 8:  ·  Problem: Prophylactic measure.   ·  Plan: DVT PPx  xarelto   --------------------------------------------  Case discussed with patient, Nurse Practitioner..   Education given on findings and plan of care  ___________________________  H. GONZALO Perez.  Pager: [phone]

## 2025-01-29 NOTE — DIETITIAN INITIAL EVALUATION ADULT - ADD RECOMMEND
1. Recommend d/c DASH/ TLC diet and liberalize to a Low Sodium Diet   2. Heart failure noted during admission, recommend daily weights   3. Recommend re-check Patient's height and weight   4. Monitor routine weights, nutrition and renal related labs, p.o. intake and tolerance, oral nutrition supplement compliance, and skin integrity

## 2025-01-29 NOTE — DISCHARGE NOTE PROVIDER - NSDCMRMEDTOKEN_GEN_ALL_CORE_FT
Albuterol (Eqv-ProAir HFA) 90 mcg/inh inhalation aerosol: 2 inhaled every 6 hours as needed for  shortness of breath and/or wheezing  atorvastatin 40 mg oral tablet: 1 tab(s) orally once a day (at bedtime)  budesonide-formoterol 80 mcg-4.5 mcg/inh inhalation aerosol: 2 puff(s) inhaled 2 times a day  butalbital/acetaminophen/caffeine 50 mg-325 mg-40 mg oral tablet: 1 tab(s) orally once a day  cloNIDine 0.1 mg oral tablet: 1 tab(s) orally every 12 hours  doxycycline hyclate 100 mg oral capsule: 1 cap(s) orally 2 times a day  gabapentin 400 mg oral tablet: 1 tab(s) orally 2 times a day  Percocet 5 mg-325 mg oral tablet: 1 tab(s) orally 2 times a day  Procardia XL 60 mg oral tablet, extended release: 1 tab(s) orally once a day   acetaminophen 325 mg oral tablet: 2 tab(s) orally every 6 hours As needed Temp greater or equal to 38C (100.4F), Mild Pain (1 - 3)  Advair Diskus 250 mcg-50 mcg inhalation powder: 1 dose(s) inhaled 2 times a day Rinse mouth following treatments  atorvastatin 40 mg oral tablet: 1 tab(s) orally once a day (at bedtime)  cloNIDine 0.1 mg oral tablet: 1 tab(s) orally every 12 hours  collagenase 250 units/g topical ointment: 1 Apply topically to affected area once a day  doxycycline hyclate 100 mg oral capsule: 1 cap(s) orally 2 times a day  gabapentin 400 mg oral capsule: 1 cap(s) orally 2 times a day  ipratropium-albuterol 0.5 mg-2.5 mg/3 mL inhalation solution: 3 milliliter(s) inhaled every 6 hours  lactulose 10 g/15 mL oral syrup: 15 milliliter(s) orally once a day Monitor BMP in Rehab Facility to monitor Potassium  melatonin 3 mg oral tablet: 1 tab(s) orally once a day (at bedtime) As needed Insomnia  morphine 15 mg/8 to 12 hr oral tablet, extended release: 1 tab(s) orally every 12 hours  naloxone: 2 spray(s) intranasally once for sedation, possible opioid overdose. Please contact provider if necessary.  nicotine: 4 milligram(s) orally 4 times a day as needed for  smoking cessation  oxyCODONE 5 mg oral tablet: 1 tab(s) orally every 4 hours As needed Severe Pain (7 - 10)  petrolatum topical ointment: 1 Apply topically to affected area once a day  polyethylene glycol 3350 oral powder for reconstitution: 17 gram(s) orally once a day  rivaroxaban 2.5 mg oral tablet: 1 tab(s) orally 2 times a day  senna leaf extract oral tablet: 2 tab(s) orally once a day (at bedtime)  sodium zirconium cyclosilicate: 10 gram(s) orally every other day Please take for 1 week MWF  sulfamethoxazole-trimethoprim 800 mg-160 mg oral tablet: 1 tab(s) orally 2 times a day  thiamine 100 mg oral tablet: 1 tab(s) orally once a day   acetaminophen 325 mg oral tablet: 2 tab(s) orally every 6 hours As needed Temp greater or equal to 38C (100.4F), Mild Pain (1 - 3)  Advair Diskus 250 mcg-50 mcg inhalation powder: 1 dose(s) inhaled 2 times a day Rinse mouth following treatments  atorvastatin 40 mg oral tablet: 1 tab(s) orally once a day (at bedtime)  cloNIDine 0.1 mg oral tablet: 1 tab(s) orally every 12 hours  collagenase 250 units/g topical ointment: 1 Apply topically to affected area once a day  gabapentin 400 mg oral capsule: 1 cap(s) orally 2 times a day  ipratropium-albuterol 0.5 mg-2.5 mg/3 mL inhalation solution: 3 milliliter(s) inhaled every 6 hours  lactulose 10 g/15 mL oral syrup: 15 milliliter(s) orally once a day until 3/13/25, then as needed thereafter. Monitor BMP in Rehab Facility 3x/week to monitor Potassium  Levaquin 750 mg oral tablet: 1 tab(s) orally every 48 hours END DATE 3/13/25  melatonin 3 mg oral tablet: 1 tab(s) orally once a day (at bedtime) As needed Insomnia  morphine 15 mg/8 to 12 hr oral tablet, extended release: 1 tab(s) orally every 12 hours  naloxone: 2 spray(s) intranasally once for sedation, possible opioid overdose. Please contact provider if necessary.  nicotine: 4 milligram(s) orally 4 times a day as needed for  smoking cessation  oxyCODONE 5 mg oral tablet: 1 tab(s) orally every 4 hours As needed Severe Pain (7 - 10)  petrolatum topical ointment: 1 Apply topically to affected area once a day  polyethylene glycol 3350 oral powder for reconstitution: 17 gram(s) orally once a day  rivaroxaban 2.5 mg oral tablet: 1 tab(s) orally 2 times a day  senna leaf extract oral tablet: 2 tab(s) orally once a day (at bedtime)  sulfamethoxazole-trimethoprim 800 mg-160 mg oral tablet: 1 tab(s) orally every 8 hours END DATE 3/13/25. THEN Bactrim DS 1 tab daily for chronic suppression thereafter  thiamine 100 mg oral tablet: 1 tab(s) orally once a day

## 2025-01-29 NOTE — DIETITIAN INITIAL EVALUATION ADULT - PERTINENT MEDS FT
MEDICATIONS  (STANDING):  albuterol/ipratropium for Nebulization 3 milliLiter(s) Nebulizer every 6 hours  atorvastatin 40 milliGRAM(s) Oral at bedtime  cefepime   IVPB 500 milliGRAM(s) IV Intermittent every 12 hours  cloNIDine 0.1 milliGRAM(s) Oral every 12 hours  collagenase Ointment 1 Application(s) Topical daily  doxycycline monohydrate Capsule 100 milliGRAM(s) Oral every 12 hours  fluticasone propionate/ salmeterol 250-50 MICROgram(s) Diskus 1 Dose(s) Inhalation two times a day  furosemide   Injectable 20 milliGRAM(s) IV Push two times a day  gabapentin 400 milliGRAM(s) Oral two times a day  morphine ER Tablet 15 milliGRAM(s) Oral every 12 hours  polyethylene glycol 3350 17 Gram(s) Oral daily  rivaroxaban 2.5 milliGRAM(s) Oral two times a day  senna 2 Tablet(s) Oral at bedtime  thiamine 100 milliGRAM(s) Oral daily  tiotropium 2.5 MICROgram(s) Inhaler 2 Puff(s) Inhalation daily    MEDICATIONS  (PRN):  acetaminophen     Tablet .. 650 milliGRAM(s) Oral every 6 hours PRN Temp greater or equal to 38C (100.4F), Mild Pain (1 - 3)  melatonin 3 milliGRAM(s) Oral at bedtime PRN Insomnia  naloxone Injectable 0.2 milliGRAM(s) IV Push every 3 minutes PRN respiratory depression/ suspected opioid OD  nicotine  Polacrilex Gum 4 milliGRAM(s) Oral four times a day PRN Smoking Cessation  ondansetron Injectable 4 milliGRAM(s) IV Push every 8 hours PRN Nausea and/or Vomiting  oxyCODONE    IR 5 milliGRAM(s) Oral every 4 hours PRN Severe Pain (7 - 10)

## 2025-01-29 NOTE — DISCHARGE NOTE PROVIDER - CARE PROVIDER_API CALL
Bear Malagon  Podiatric Medicine and Surgery  [address] [place] [address]  Phone: [phone]  Fax: [phone]  Follow Up Time:     Navdeep Montez  Internal Medicine  [address] [place] [address]  Phone: [phone]  Fax: [phone]  Follow Up Time:     Bethany Waters)  Infectious Disease  [address] [place] [address]  Phone: [phone]  Fax: [phone]  Follow Up Time:     Chaka Lawrence  Cardiovascular Disease  [address] [place] [address]  Phone: [phone]  Fax: [phone]  Follow Up Time:    Bear Malagon  Podiatric Medicine and Surgery  [address] [place] [address]  Phone: [phone]  Fax: [phone]  Follow Up Time: 1 week    Navdeep Montez  Internal Medicine  [address] [place] [address]  Phone: [phone]  Fax: [phone]  Follow Up Time: 1 week    Bethany Waters)  Infectious Disease  [address] [place] [address]  Phone: [phone]  Fax: [phone]  Follow Up Time: 2 weeks    Chaka Lawrence  Cardiovascular Disease  [address] [place] [address]  Phone: [phone]  Fax: [phone]  Follow Up Time: 1 week

## 2025-01-29 NOTE — DIETITIAN INITIAL EVALUATION ADULT - PERTINENT LABORATORY DATA
01-27    141  |  103  |  29[H]  ----------------------------<  189[H]  4.7   |  29  |  1.08    Ca    8.4      27 Jan 2025 22:26  Mg     2.1     01-27    TPro  6.7  /  Alb  2.9[L]  /  TBili  0.3  /  DBili  x   /  AST  15  /  ALT  11  /  AlkPhos  109  01-27

## 2025-01-29 NOTE — DIETITIAN INITIAL EVALUATION ADULT - REASON INDICATOR FOR ASSESSMENT
Dietitian consult received for: MST score 2 or >   Chart reviewed, events noted   Information obtained from: electronic medical record, Patient visit

## 2025-01-29 NOTE — PROGRESS NOTE ADULT - SUBJECTIVE AND OBJECTIVE BOX
Patient is a 79y old  Female who presents with a chief complaint of Worsening R hip pain (29 Jan 2025 11:58)      HPI:  79F w/ hx of ETOH in past, PAD w/ b/l carotid artery stenosis, HFpEF EF 64% w/ severe AS, COPD, HTN, CAD, RLE DVT 8/2023 w/ hx of R hip infection p/w worsening R hip pain. Patient is poor historian, not answering all questions she is being asked, continued requests for pain medication. Patient endorses being admitted to [facility] for multiple days for hip pain. States she did not receive antibiotics. Cannot specify nature of her admission. States she fell in front of [employer] before [place] admission but not since. States she was able to bear weight after leaving hospital around 3 days ago but pain has progressively worsened and does not think she can ambulate now. Denies fevers, chills or additional trauma. States she does not remember her medications and is not compliant with many of them outside of hypertension.     In ER: Given Tylenol 1gm IVPB, Duoneb x2, Morphine 4mg IV x1 (27 Jan 2025 21:36)      PAST MEDICAL & SURGICAL HISTORY:  Hypertension      Hyperlipidemia      CAD (coronary artery disease)      Migraine      Anxiety      Emphysema, unspecified      HTN (hypertension)      Anxiety      Coronary artery disease      Stented coronary artery      Seizure      Alcohol abuse      Migraine      Pain of right hip joint      MRSA bacteremia      Essential hypertension      CAD (coronary artery disease)      Elevated brain natriuretic peptide (BNP) level      S/P bladder repair      Status post total hip replacement, right  5/21/18      History of arthroplasty of right hip          MEDICATIONS  (STANDING):  albuterol/ipratropium for Nebulization 3 milliLiter(s) Nebulizer every 6 hours  atorvastatin 40 milliGRAM(s) Oral at bedtime  cefepime   IVPB 500 milliGRAM(s) IV Intermittent every 12 hours  cloNIDine 0.1 milliGRAM(s) Oral every 12 hours  doxycycline monohydrate Capsule 100 milliGRAM(s) Oral every 12 hours  fluticasone propionate/ salmeterol 250-50 MICROgram(s) Diskus 1 Dose(s) Inhalation two times a day  furosemide   Injectable 20 milliGRAM(s) IV Push two times a day  gabapentin 400 milliGRAM(s) Oral two times a day  morphine ER Tablet 15 milliGRAM(s) Oral every 12 hours  polyethylene glycol 3350 17 Gram(s) Oral daily  rivaroxaban 2.5 milliGRAM(s) Oral two times a day  senna 2 Tablet(s) Oral at bedtime  thiamine 100 milliGRAM(s) Oral daily  tiotropium 2.5 MICROgram(s) Inhaler 2 Puff(s) Inhalation daily    MEDICATIONS  (PRN):  acetaminophen     Tablet .. 650 milliGRAM(s) Oral every 6 hours PRN Temp greater or equal to 38C (100.4F), Mild Pain (1 - 3)  melatonin 3 milliGRAM(s) Oral at bedtime PRN Insomnia  naloxone Injectable 0.2 milliGRAM(s) IV Push every 3 minutes PRN respiratory depression/ suspected opioid OD  nicotine  Polacrilex Gum 4 milliGRAM(s) Oral four times a day PRN Smoking Cessation  ondansetron Injectable 4 milliGRAM(s) IV Push every 8 hours PRN Nausea and/or Vomiting  oxyCODONE    IR 5 milliGRAM(s) Oral every 4 hours PRN Severe Pain (7 - 10)      Allergies    No Known Allergies    Intolerances        VITALS:    Vital Signs Last 24 Hrs  T(C): 36.6 (29 Jan 2025 05:00), Max: 37.1 (28 Jan 2025 20:10)  T(F): 97.8 (29 Jan 2025 05:00), Max: 98.8 (28 Jan 2025 20:10)  HR: 82 (29 Jan 2025 05:00) (62 - 94)  BP: 157/75 (29 Jan 2025 05:00) (141/74 - 162/75)  BP(mean): --  RR: 18 (29 Jan 2025 05:00) (18 - 18)  SpO2: 94% (29 Jan 2025 05:00) (94% - 98%)    Parameters below as of 29 Jan 2025 05:00  Patient On (Oxygen Delivery Method): nasal cannula        LABS:                          11.1   5.81  )-----------( 225      ( 27 Jan 2025 22:26 )             36.0       01-27    141  |  103  |  29[H]  ----------------------------<  189[H]  4.7   |  29  |  1.08    Ca    8.4      27 Jan 2025 22:26  Mg     2.1     01-27    TPro  6.7  /  Alb  2.9[L]  /  TBili  0.3  /  DBili  x   /  AST  15  /  ALT  11  /  AlkPhos  109  01-27      CAPILLARY BLOOD GLUCOSE          PT/INR - ( 27 Jan 2025 16:35 )   PT: 15.4 sec;   INR: 1.35 ratio         PTT - ( 27 Jan 2025 16:35 )  PTT:36.9 sec    LOWER EXTREMITY PHYSICAL EXAM:    Vasular: DP/PT 0/4  B/L, CFT < 3 seconds B/L, Temperature gradient warm to warm R, warm to cool L    Neuro: Epicritic sensation intact to the level of the ankle, B/L.  Musculoskeletal/Ortho: s/p R USAMA & revision 9/2022  Skin: R lateral malleolus wound to level of capsule, fibrogranular wound bed, indurated borders, well circumscribed, no malodor or purulence. R Lower Extremity erythema consistent with DVT. Vascular: DP/PT 2/4, B/L, CFT <_ seconds B/L, Temperature gradient _, B/L.       RADIOLOGY & ADDITIONAL STUDIES:       Patient is a 79y old  Female who presents with a chief complaint of Worsening R hip pain (29 Jan 2025 11:58)      HPI:  79F w/ hx of ETOH in past, PAD w/ b/l carotid artery stenosis, HFpEF EF 64% w/ severe AS, COPD, HTN, CAD, RLE DVT 8/2023 w/ hx of R hip infection p/w worsening R hip pain. Patient is poor historian, not answering all questions she is being asked, continued requests for pain medication. Patient endorses being admitted to [facility] for multiple days for hip pain. States she did not receive antibiotics. Cannot specify nature of her admission. States she fell in front of [employer] before [place] admission but not since. States she was able to bear weight after leaving hospital around 3 days ago but pain has progressively worsened and does not think she can ambulate now. Denies fevers, chills or additional trauma. States she does not remember her medications and is not compliant with many of them outside of hypertension.     In ER: Given Tylenol 1gm IVPB, Duoneb x2, Morphine 4mg IV x1 (27 Jan 2025 21:36)      PAST MEDICAL & SURGICAL HISTORY:  Hypertension      Hyperlipidemia      CAD (coronary artery disease)      Migraine      Anxiety      Emphysema, unspecified      HTN (hypertension)      Anxiety      Coronary artery disease      Stented coronary artery      Seizure      Alcohol abuse      Migraine      Pain of right hip joint      MRSA bacteremia      Essential hypertension      CAD (coronary artery disease)      Elevated brain natriuretic peptide (BNP) level      S/P bladder repair      Status post total hip replacement, right  5/21/18      History of arthroplasty of right hip          MEDICATIONS  (STANDING):  albuterol/ipratropium for Nebulization 3 milliLiter(s) Nebulizer every 6 hours  atorvastatin 40 milliGRAM(s) Oral at bedtime  cefepime   IVPB 500 milliGRAM(s) IV Intermittent every 12 hours  cloNIDine 0.1 milliGRAM(s) Oral every 12 hours  doxycycline monohydrate Capsule 100 milliGRAM(s) Oral every 12 hours  fluticasone propionate/ salmeterol 250-50 MICROgram(s) Diskus 1 Dose(s) Inhalation two times a day  furosemide   Injectable 20 milliGRAM(s) IV Push two times a day  gabapentin 400 milliGRAM(s) Oral two times a day  morphine ER Tablet 15 milliGRAM(s) Oral every 12 hours  polyethylene glycol 3350 17 Gram(s) Oral daily  rivaroxaban 2.5 milliGRAM(s) Oral two times a day  senna 2 Tablet(s) Oral at bedtime  thiamine 100 milliGRAM(s) Oral daily  tiotropium 2.5 MICROgram(s) Inhaler 2 Puff(s) Inhalation daily    MEDICATIONS  (PRN):  acetaminophen     Tablet .. 650 milliGRAM(s) Oral every 6 hours PRN Temp greater or equal to 38C (100.4F), Mild Pain (1 - 3)  melatonin 3 milliGRAM(s) Oral at bedtime PRN Insomnia  naloxone Injectable 0.2 milliGRAM(s) IV Push every 3 minutes PRN respiratory depression/ suspected opioid OD  nicotine  Polacrilex Gum 4 milliGRAM(s) Oral four times a day PRN Smoking Cessation  ondansetron Injectable 4 milliGRAM(s) IV Push every 8 hours PRN Nausea and/or Vomiting  oxyCODONE    IR 5 milliGRAM(s) Oral every 4 hours PRN Severe Pain (7 - 10)      Allergies    No Known Allergies    Intolerances        VITALS:    Vital Signs Last 24 Hrs  T(C): 36.6 (29 Jan 2025 05:00), Max: 37.1 (28 Jan 2025 20:10)  T(F): 97.8 (29 Jan 2025 05:00), Max: 98.8 (28 Jan 2025 20:10)  HR: 82 (29 Jan 2025 05:00) (62 - 94)  BP: 157/75 (29 Jan 2025 05:00) (141/74 - 162/75)  BP(mean): --  RR: 18 (29 Jan 2025 05:00) (18 - 18)  SpO2: 94% (29 Jan 2025 05:00) (94% - 98%)    Parameters below as of 29 Jan 2025 05:00  Patient On (Oxygen Delivery Method): nasal cannula        LABS:                          11.1   5.81  )-----------( 225      ( 27 Jan 2025 22:26 )             36.0       01-27    141  |  103  |  29[H]  ----------------------------<  189[H]  4.7   |  29  |  1.08    Ca    8.4      27 Jan 2025 22:26  Mg     2.1     01-27    TPro  6.7  /  Alb  2.9[L]  /  TBili  0.3  /  DBili  x   /  AST  15  /  ALT  11  /  AlkPhos  109  01-27      CAPILLARY BLOOD GLUCOSE          PT/INR - ( 27 Jan 2025 16:35 )   PT: 15.4 sec;   INR: 1.35 ratio         PTT - ( 27 Jan 2025 16:35 )  PTT:36.9 sec    LOWER EXTREMITY PHYSICAL EXAM:    Vasular: DP/PT 0/4  B/L, CFT < 3 seconds B/L, Temperature gradient warm to warm R, warm to cool L    Neuro: Epicritic sensation intact to the level of the ankle, B/L.  Musculoskeletal/Ortho: s/p R USAMA & revision 9/2022  Skin: R lateral malleolus wound to level of bone, fibrogranular wound bed, indurated borders, well circumscribed, no malodor or purulence. R Lower Extremity erythema consistent with DVT.     RADIOLOGY & ADDITIONAL STUDIES:

## 2025-01-29 NOTE — DIETITIAN INITIAL EVALUATION ADULT - PROBLEM SELECTOR PLAN 6
hx of severe AS, note LE edema  -Patient does not  want to take Lasix overnight, patient willing to try premafit when bed which supports device available.  Will order Lasix in AM and reassess patient willingness to try  -There was plan during prior admission for structural heart evaluation after acute infectious concerns addressed

## 2025-01-29 NOTE — DIETITIAN INITIAL EVALUATION ADULT - ENERGY INTAKE
No available documented p.o. intake per nursing flowsheets. Patient reports fair appetite during admission. Offered oral nutrition shake, and to review dietary preferences for p.o. optimization, Patient declining all supplements at this time./Fair (50-75%)

## 2025-01-29 NOTE — DIETITIAN INITIAL EVALUATION ADULT - PROBLEM SELECTOR PLAN 3
Notable wheeze on exam, unclear if chronic. Reportedly has 02 PRN at home. Patient smoking tobacco, not clarifying how much.    -Duonebs Q6hrs  -Cont. Symbicort BID  -Cont. Spiriva  -Willing to try Nicorette gum.

## 2025-01-29 NOTE — DIETITIAN INITIAL EVALUATION ADULT - ORAL INTAKE PTA/DIET HISTORY
Patient visited. Patient denies adhering to a therapeutic diet, and reports normal/fair appetite prior to admission. Patient reports having 2-3 meals daily. NFKA or intolerances reported. Patient denies micronutrient supplementation prior to admission.

## 2025-01-29 NOTE — DISCHARGE NOTE PROVIDER - CARE PROVIDERS DIRECT ADDRESSES
,[email or web address]_Unknown

## 2025-01-29 NOTE — DISCHARGE NOTE PROVIDER - NSFOLLOWUPCLINICSTOKEN_GEN_ALL_ED_FT
[id number]: || ||00\01||False;[id number]: || ||00\01||False;231311:1 week|| ||00\01||False; [id number]:1-3 days|| ||00\01||False;720576:1 week|| ||00\01||False;403327:1 week|| ||00\01||False;

## 2025-01-29 NOTE — CHART NOTE - NSCHARTNOTEFT_GEN_A_CORE
Patients wound(s) are under the care of DPM. Wound team not following.     Felicia Otoole [facility], [place]  [phone]

## 2025-01-29 NOTE — DIETITIAN INITIAL EVALUATION ADULT - PROBLEM SELECTOR PLAN 1
Patient endorsing severe R hip pain w/o inciting fall or traumatic event. 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 06/2024 admission with possible joint infection. No clear signs of infection currently and exam is equivocal. Given hx, will eval further for infection. Could not reach patient's daughter overnight to discuss case.    -Pain control with Dilaudid 0.5mg IV Q6hrs PRN severe pain overnight, low threshold for pain consult, as per prior admission there is some substance use history.  -Multimodal pain control  -Bowel regimen  -Send ESR and CRP STAT  -Send BCxs STAT  -F/u Ortho recommendations  -Will watch off antibiotics for now, low threshold to cover if patient develops signs of systemic infection  -Trend wbc, fever curve can consider ID consult in AM  -F/u plain films

## 2025-01-30 LAB
ALBUMIN SERPL ELPH-MCNC: 2.5 G/DL — LOW (ref 3.3–5)
ALP SERPL-CCNC: 80 U/L — SIGNIFICANT CHANGE UP (ref 40–120)
ALT FLD-CCNC: 8 U/L — LOW (ref 10–45)
ANION GAP SERPL CALC-SCNC: 10 MMOL/L — SIGNIFICANT CHANGE UP (ref 5–17)
AST SERPL-CCNC: 14 U/L — SIGNIFICANT CHANGE UP (ref 10–40)
BASOPHILS # BLD AUTO: 0.01 K/UL — SIGNIFICANT CHANGE UP (ref 0–0.2)
BASOPHILS NFR BLD AUTO: 0.2 % — SIGNIFICANT CHANGE UP (ref 0–2)
BILIRUB SERPL-MCNC: 0.4 MG/DL — SIGNIFICANT CHANGE UP (ref 0.2–1.2)
BUN SERPL-MCNC: 26 MG/DL — HIGH (ref 7–23)
CALCIUM SERPL-MCNC: 8.3 MG/DL — LOW (ref 8.4–10.5)
CHLORIDE SERPL-SCNC: 97 MMOL/L — SIGNIFICANT CHANGE UP (ref 96–108)
CO2 SERPL-SCNC: 30 MMOL/L — SIGNIFICANT CHANGE UP (ref 22–31)
CREAT SERPL-MCNC: 0.84 MG/DL — SIGNIFICANT CHANGE UP (ref 0.5–1.3)
EGFR: 72 ML/MIN/1.73M2 — SIGNIFICANT CHANGE UP
EOSINOPHIL # BLD AUTO: 0.05 K/UL — SIGNIFICANT CHANGE UP (ref 0–0.5)
EOSINOPHIL NFR BLD AUTO: 1.2 % — SIGNIFICANT CHANGE UP (ref 0–6)
GLUCOSE BLDC GLUCOMTR-MCNC: 118 MG/DL — HIGH (ref 70–99)
GLUCOSE SERPL-MCNC: 79 MG/DL — SIGNIFICANT CHANGE UP (ref 70–99)
HCT VFR BLD CALC: 30.7 % — LOW (ref 34.5–45)
HGB BLD-MCNC: 9.2 G/DL — LOW (ref 11.5–15.5)
IMM GRANULOCYTES NFR BLD AUTO: 0.5 % — SIGNIFICANT CHANGE UP (ref 0–0.9)
LYMPHOCYTES # BLD AUTO: 0.37 K/UL — LOW (ref 1–3.3)
LYMPHOCYTES # BLD AUTO: 8.9 % — LOW (ref 13–44)
M PNEUMO IGM SER-ACNC: 0.69 INDEX — SIGNIFICANT CHANGE UP (ref 0–0.9)
MCHC RBC-ENTMCNC: 30 G/DL — LOW (ref 32–36)
MCHC RBC-ENTMCNC: 30.2 PG — SIGNIFICANT CHANGE UP (ref 27–34)
MCV RBC AUTO: 100.7 FL — HIGH (ref 80–100)
MONOCYTES # BLD AUTO: 0.62 K/UL — SIGNIFICANT CHANGE UP (ref 0–0.9)
MONOCYTES NFR BLD AUTO: 14.8 % — HIGH (ref 2–14)
MYCOPLASMA AG SPEC QL: NEGATIVE — SIGNIFICANT CHANGE UP
NEUTROPHILS # BLD AUTO: 3.11 K/UL — SIGNIFICANT CHANGE UP (ref 1.8–7.4)
NEUTROPHILS NFR BLD AUTO: 74.4 % — SIGNIFICANT CHANGE UP (ref 43–77)
NRBC # BLD: 0 /100 WBCS — SIGNIFICANT CHANGE UP (ref 0–0)
NRBC BLD-RTO: 0 /100 WBCS — SIGNIFICANT CHANGE UP (ref 0–0)
PLATELET # BLD AUTO: 199 K/UL — SIGNIFICANT CHANGE UP (ref 150–400)
POTASSIUM SERPL-MCNC: 4.1 MMOL/L — SIGNIFICANT CHANGE UP (ref 3.5–5.3)
POTASSIUM SERPL-SCNC: 4.1 MMOL/L — SIGNIFICANT CHANGE UP (ref 3.5–5.3)
PROT SERPL-MCNC: 6 G/DL — SIGNIFICANT CHANGE UP (ref 6–8.3)
RBC # BLD: 3.05 M/UL — LOW (ref 3.8–5.2)
RBC # FLD: 17 % — HIGH (ref 10.3–14.5)
SODIUM SERPL-SCNC: 137 MMOL/L — SIGNIFICANT CHANGE UP (ref 135–145)
WBC # BLD: 4.18 K/UL — SIGNIFICANT CHANGE UP (ref 3.8–10.5)
WBC # FLD AUTO: 4.18 K/UL — SIGNIFICANT CHANGE UP (ref 3.8–10.5)

## 2025-01-30 RX ORDER — ACETAMINOPHEN 500 MG/5ML
1000 LIQUID (ML) ORAL ONCE
Refills: 0 | Status: COMPLETED | OUTPATIENT
Start: 2025-01-30 | End: 2025-01-30

## 2025-01-30 RX ADMIN — Medication 400 MILLIGRAM(S): at 03:11

## 2025-01-30 RX ADMIN — FUROSEMIDE 20 MILLIGRAM(S): 10 INJECTION INTRAMUSCULAR; INTRAVENOUS at 05:07

## 2025-01-30 RX ADMIN — Medication 1 DOSE(S): at 05:11

## 2025-01-30 RX ADMIN — IPRATROPIUM BROMIDE AND ALBUTEROL SULFATE 3 MILLILITER(S): .5; 2.5 SOLUTION RESPIRATORY (INHALATION) at 01:28

## 2025-01-30 RX ADMIN — Medication 650 MILLIGRAM(S): at 12:52

## 2025-01-30 RX ADMIN — Medication 1 APPLICATION(S): at 12:22

## 2025-01-30 RX ADMIN — GABAPENTIN 400 MILLIGRAM(S): 400 CAPSULE ORAL at 17:21

## 2025-01-30 RX ADMIN — IPRATROPIUM BROMIDE AND ALBUTEROL SULFATE 3 MILLILITER(S): .5; 2.5 SOLUTION RESPIRATORY (INHALATION) at 05:13

## 2025-01-30 RX ADMIN — Medication 400 MILLIGRAM(S): at 21:34

## 2025-01-30 RX ADMIN — RIVAROXABAN 2.5 MILLIGRAM(S): 10 TABLET, FILM COATED ORAL at 17:22

## 2025-01-30 RX ADMIN — RIVAROXABAN 2.5 MILLIGRAM(S): 10 TABLET, FILM COATED ORAL at 05:06

## 2025-01-30 RX ADMIN — FUROSEMIDE 20 MILLIGRAM(S): 10 INJECTION INTRAMUSCULAR; INTRAVENOUS at 14:36

## 2025-01-30 RX ADMIN — OXYCODONE HYDROCHLORIDE 5 MILLIGRAM(S): 30 TABLET ORAL at 09:30

## 2025-01-30 RX ADMIN — Medication 100 MILLIGRAM(S): at 17:21

## 2025-01-30 RX ADMIN — OXYCODONE HYDROCHLORIDE 5 MILLIGRAM(S): 30 TABLET ORAL at 01:26

## 2025-01-30 RX ADMIN — Medication 15 MILLIGRAM(S): at 17:21

## 2025-01-30 RX ADMIN — GABAPENTIN 400 MILLIGRAM(S): 400 CAPSULE ORAL at 05:05

## 2025-01-30 RX ADMIN — Medication 400 MILLIGRAM(S): at 17:02

## 2025-01-30 RX ADMIN — OXYCODONE HYDROCHLORIDE 5 MILLIGRAM(S): 30 TABLET ORAL at 02:20

## 2025-01-30 RX ADMIN — Medication 0.1 MILLIGRAM(S): at 17:22

## 2025-01-30 RX ADMIN — CEFEPIME 100 MILLIGRAM(S): 2 INJECTION, POWDER, FOR SOLUTION INTRAVENOUS at 17:02

## 2025-01-30 RX ADMIN — Medication 1000 MILLIGRAM(S): at 22:34

## 2025-01-30 RX ADMIN — CEFEPIME 100 MILLIGRAM(S): 2 INJECTION, POWDER, FOR SOLUTION INTRAVENOUS at 05:05

## 2025-01-30 RX ADMIN — OXYCODONE HYDROCHLORIDE 5 MILLIGRAM(S): 30 TABLET ORAL at 13:36

## 2025-01-30 RX ADMIN — Medication 1000 MILLIGRAM(S): at 04:00

## 2025-01-30 RX ADMIN — Medication 15 MILLIGRAM(S): at 05:05

## 2025-01-30 RX ADMIN — Medication 0.1 MILLIGRAM(S): at 05:06

## 2025-01-30 RX ADMIN — Medication 100 MILLIGRAM(S): at 05:05

## 2025-01-30 NOTE — PROGRESS NOTE ADULT - SUBJECTIVE AND OBJECTIVE BOX
Cardiovascular Disease Progress Note    Overnight events: No acute events overnight.  no new cardiac sx  Otherwise review of systems negative    Objective Findings:  T(C): 36.9 (01-30-25 @ 04:51), Max: 36.9 (01-29-25 @ 21:11)  HR: 82 (01-30-25 @ 04:51) (75 - 82)  BP: 116/68 (01-30-25 @ 04:51) (111/55 - 145/53)  RR: 18 (01-30-25 @ 04:51) (18 - 18)  SpO2: 94% (01-30-25 @ 04:51) (92% - 97%)  Wt(kg): --  Daily     Daily       Physical Exam:  Gen: NAD  HEENT: EOMI  CV: RRR, normal S1 + S2, no m/r/g  Lungs: CTAB  Abd: soft, non-tender  Ext: No edema    Telemetry:    Laboratory Data:                    Inpatient Medications:  MEDICATIONS  (STANDING):  albuterol/ipratropium for Nebulization 3 milliLiter(s) Nebulizer every 6 hours  atorvastatin 40 milliGRAM(s) Oral at bedtime  cefepime   IVPB 500 milliGRAM(s) IV Intermittent every 12 hours  cloNIDine 0.1 milliGRAM(s) Oral every 12 hours  collagenase Ointment 1 Application(s) Topical daily  doxycycline monohydrate Capsule 100 milliGRAM(s) Oral every 12 hours  fluticasone propionate/ salmeterol 250-50 MICROgram(s) Diskus 1 Dose(s) Inhalation two times a day  furosemide   Injectable 20 milliGRAM(s) IV Push two times a day  gabapentin 400 milliGRAM(s) Oral two times a day  morphine ER Tablet 15 milliGRAM(s) Oral every 12 hours  polyethylene glycol 3350 17 Gram(s) Oral daily  rivaroxaban 2.5 milliGRAM(s) Oral two times a day  senna 2 Tablet(s) Oral at bedtime  thiamine 100 milliGRAM(s) Oral daily  tiotropium 2.5 MICROgram(s) Inhaler 2 Puff(s) Inhalation daily      Assessment:  79F w/ hx of ETOH in past, PAD w/ b/l carotid artery stenosis, HFpEF EF 64% w/ severe AS, COPD, HTN, CAD, RLE DVT 8/2023 w/ hx of R hip infection p/w worsening R hip pain and hypoxic resp failure    Recs:  cardiac stable  no e/o acs  cw antiplatelet, statin and antianginals for cad/stent. denies chest pain. hold off on ischemic eval for now   vol status improving =change lasix to po. gentle diuresis as patient is preload dependent in setting of AS  s/p echo, results noted. mod to severe AS. doubt would be a TAVR candidate in setting of chronic hip infection and risk of endocarditis  consider pulmonary consult to assist with tx of copd  cw xarelto 2.5mg bid for PAD dosing and "ppx for hx of VTE"   pain control  f/u ortho recs  will follow          Over 25 minutes spent on total encounter; more than 50% of the visit was spent counseling and/or coordinating care by the attending physician.      Chaka Lawrence MD   Cardiovascular Disease  [phone] Cardiovascular Disease Progress Note    Overnight events: No acute events overnight.  no new cardiac sx  Otherwise review of systems negative    Objective Findings:  T(C): 36.9 (01-30-25 @ 04:51), Max: 36.9 (01-29-25 @ 21:11)  HR: 82 (01-30-25 @ 04:51) (75 - 82)  BP: 116/68 (01-30-25 @ 04:51) (111/55 - 145/53)  RR: 18 (01-30-25 @ 04:51) (18 - 18)  SpO2: 94% (01-30-25 @ 04:51) (92% - 97%)  Wt(kg): --  Daily     Daily       Physical Exam:  Gen: NAD  HEENT: EOMI  CV: RRR, normal S1 + S2, no m/r/g  Lungs: CTAB  Abd: soft, non-tender  Ext: No edema    Telemetry:    Laboratory Data:                    Inpatient Medications:  MEDICATIONS  (STANDING):  albuterol/ipratropium for Nebulization 3 milliLiter(s) Nebulizer every 6 hours  atorvastatin 40 milliGRAM(s) Oral at bedtime  cefepime   IVPB 500 milliGRAM(s) IV Intermittent every 12 hours  cloNIDine 0.1 milliGRAM(s) Oral every 12 hours  collagenase Ointment 1 Application(s) Topical daily  doxycycline monohydrate Capsule 100 milliGRAM(s) Oral every 12 hours  fluticasone propionate/ salmeterol 250-50 MICROgram(s) Diskus 1 Dose(s) Inhalation two times a day  furosemide   Injectable 20 milliGRAM(s) IV Push two times a day  gabapentin 400 milliGRAM(s) Oral two times a day  morphine ER Tablet 15 milliGRAM(s) Oral every 12 hours  polyethylene glycol 3350 17 Gram(s) Oral daily  rivaroxaban 2.5 milliGRAM(s) Oral two times a day  senna 2 Tablet(s) Oral at bedtime  thiamine 100 milliGRAM(s) Oral daily  tiotropium 2.5 MICROgram(s) Inhaler 2 Puff(s) Inhalation daily      Assessment:  79F w/ hx of ETOH in past, PAD w/ b/l carotid artery stenosis, HFpEF EF 64% w/ severe AS, COPD, HTN, CAD, RLE DVT 8/2023 w/ hx of R hip infection p/w worsening R hip pain and hypoxic resp failure    Recs:  cardiac stable  no e/o acs  cw antiplatelet, statin and antianginals for cad/stent. denies chest pain. hold off on ischemic eval for now   vol status improving =on lasix 20mg bid. gentle diuresis as patient is preload dependent in setting of AS  s/p echo, results noted. mod to severe AS. doubt would be a TAVR candidate in setting of chronic hip infection and risk of endocarditis  consider pulmonary consult to assist with tx of copd  cw xarelto 2.5mg bid for PAD dosing and "ppx for hx of VTE"   pain control  f/u ortho recs  will follow          Over 25 minutes spent on total encounter; more than 50% of the visit was spent counseling and/or coordinating care by the attending physician.      Chaka Lawrence MD   Cardiovascular Disease  [phone]

## 2025-01-30 NOTE — PROGRESS NOTE ADULT - SUBJECTIVE AND OBJECTIVE BOX
Patient is a 79y old  Female who presents with a chief complaint of Worsening R hip pain (30 Jan 2025 07:22)       INTERVAL HPI/OVERNIGHT EVENTS:  Patient seen and evaluated at bedside.  Pt is resting comfortable in NAD. Denies N/V/F/C.      Allergies    No Known Allergies    Intolerances        Vital Signs Last 24 Hrs  T(C): 36.9 (30 Jan 2025 04:51), Max: 36.9 (29 Jan 2025 21:11)  T(F): 98.4 (30 Jan 2025 04:51), Max: 98.5 (29 Jan 2025 21:11)  HR: 82 (30 Jan 2025 04:51) (75 - 82)  BP: 116/68 (30 Jan 2025 04:51) (111/55 - 145/53)  BP(mean): --  RR: 18 (30 Jan 2025 04:51) (18 - 18)  SpO2: 94% (30 Jan 2025 04:51) (92% - 97%)    Parameters below as of 30 Jan 2025 04:51  Patient On (Oxygen Delivery Method): nasal cannula        LABS:                        9.2    4.18  )-----------( 199      ( 30 Jan 2025 07:11 )             30.7     01-30    137  |  97  |  26[H]  ----------------------------<  79  4.1   |  30  |  0.84    Ca    8.3[L]      30 Jan 2025 07:13    TPro  6.0  /  Alb  2.5[L]  /  TBili  0.4  /  DBili  x   /  AST  14  /  ALT  8[L]  /  AlkPhos  80  01-30      Urinalysis Basic - ( 30 Jan 2025 07:13 )    Color: x / Appearance: x / SG: x / pH: x  Gluc: 79 mg/dL / Ketone: x  / Bili: x / Urobili: x   Blood: x / Protein: x / Nitrite: x   Leuk Esterase: x / RBC: x / WBC x   Sq Epi: x / Non Sq Epi: x / Bacteria: x      CAPILLARY BLOOD GLUCOSE      POCT Blood Glucose.: 118 mg/dL (29 Jan 2025 17:06)      Lower Extremity Physical Exam:  Vasular: DP/PT 0/4  B/L, CFT < 3 seconds B/L, Temperature gradient warm to warm R, warm to cool L    Neuro: Epicritic sensation intact to the level of the ankle, B/L.  Musculoskeletal/Ortho: s/p R USAMA & revision 9/2022  Skin: R lateral malleolus wound to level of bone, fibrogranular wound bed, indurated borders, well circumscribed, no malodor or purulence.     RADIOLOGY & ADDITIONAL TESTS:

## 2025-01-30 NOTE — PROGRESS NOTE ADULT - ASSESSMENT
77F w/ R lateral malleolus wound to bone  - Pt seen and evaluated  - Afebrile, No Leukocytosis   - R lateral malleolus wound to level of bone, fibrogranular wound bed, indurated borders, well circumscribed, no malodor or purulence. L heel wound to the level of subq, no acute acute signs of infection.   - R ankle MR: early OM of lateral mal  - Rec ID consult  - STRICT decubitus precautions, Z- FLOWS boots at all time when in bed and in chair resting.   - No acute podiatric surgical intervention, OM extends above podiatric surgical scope  - Wound care instructions and followup information in discharge paperwork.   - Discussed with  attending.

## 2025-01-30 NOTE — PROGRESS NOTE ADULT - ASSESSMENT
_________________________________________________________________________________________  ========>>  M E D I C A L   A T T E N D I N G    F O L L O W  U P  N O T E  <<=========  -----------------------------------------------------------------------------------------------------    - Patient seen and examined by me earlier today.   - In summary,  DAQUAN MARTINEZ is a 79y year old woman admitted with hip pain   - Patient today overall doing failrly, still complains of a ot of pain.. patient reprotedly declining other treatments including nebs, diuretics...      patient with spisodes of hypoxemia yesterday > CTA and dulex as bellow.. (elevated Dimer) negative for PE > starting on antibiotics  for pneumonia     ==================>> REVIEW OF SYSTEM <<=================    GEN: no fever, no chills, pain in right hip area..   RESP: no SOB, no cough, no sputum  CVS: no chest pain, no palpitations  GI: no abdominal pain, no nausea  : no dysuria, no frequency  Neuro: no headache, no dizziness    ==================>> PHYSICAL EXAM <<=================    GEN: A&O X 3 , NAD , comfortable, pleasant, calm ..   HEENT: NCAT, PERRL, MMM, hearing intact  CVS: S1S2 , regular , No M/R/G appreciated  PULM: scattered wheez   ABD.: soft. non tender, non distended,  Extrem: intact pulses , leg edema and stasis changes        ( Note written / Date of service 01-30-25 ( This is certified to be the same as "ENTERED" date above ( for billing purposes)))    ==================>> MEDICATIONS <<====================    albuterol/ipratropium for Nebulization 3 milliLiter(s) Nebulizer every 6 hours  atorvastatin 40 milliGRAM(s) Oral at bedtime  cefepime   IVPB 500 milliGRAM(s) IV Intermittent every 12 hours  cloNIDine 0.1 milliGRAM(s) Oral every 12 hours  collagenase Ointment 1 Application(s) Topical daily  doxycycline monohydrate Capsule 100 milliGRAM(s) Oral every 12 hours  fluticasone propionate/ salmeterol 250-50 MICROgram(s) Diskus 1 Dose(s) Inhalation two times a day  furosemide   Injectable 20 milliGRAM(s) IV Push two times a day  gabapentin 400 milliGRAM(s) Oral two times a day  morphine ER Tablet 15 milliGRAM(s) Oral every 12 hours  polyethylene glycol 3350 17 Gram(s) Oral daily  rivaroxaban 2.5 milliGRAM(s) Oral two times a day  senna 2 Tablet(s) Oral at bedtime  thiamine 100 milliGRAM(s) Oral daily  tiotropium 2.5 MICROgram(s) Inhaler 2 Puff(s) Inhalation daily    MEDICATIONS  (PRN):  acetaminophen     Tablet .. 650 milliGRAM(s) Oral every 6 hours PRN Temp greater or equal to 38C (100.4F), Mild Pain (1 - 3)  melatonin 3 milliGRAM(s) Oral at bedtime PRN Insomnia  naloxone Injectable 0.2 milliGRAM(s) IV Push every 3 minutes PRN respiratory depression/ suspected opioid OD  nicotine  Polacrilex Gum 4 milliGRAM(s) Oral four times a day PRN Smoking Cessation  ondansetron Injectable 4 milliGRAM(s) IV Push every 8 hours PRN Nausea and/or Vomiting  oxyCODONE    IR 5 milliGRAM(s) Oral every 4 hours PRN Severe Pain (7 - 10)    ___________  Active diet:  Diet, DASH/TLC:   Sodium & Cholesterol Restricted  ___________________    ==================>> VITAL SIGNS <<==================  Height (cm): 154.9  Weight (kg): 63.5  BMI (kg/m2): 26.5  Vital Signs Last 24 HrsT(C): 36.3 (01-30-25 @ 12:34)  T(F): 97.3 (01-30-25 @ 12:34), Max: 98.5 (01-29-25 @ 21:11)  HR: 73 (01-30-25 @ 12:34) (73 - 82)  BP: 127/70 (01-30-25 @ 12:34)  RR: 18 (01-30-25 @ 12:34) (18 - 18)  SpO2: 96% (01-30-25 @ 12:34) (92% - 97%)      CAPILLARY BLOOD GLUCOSE      POCT Blood Glucose.: 118 mg/dL (29 Jan 2025 17:06)     ==================>> LAB AND IMAGING <<==================                        9.2    4.18  )-----------( 199      ( 30 Jan 2025 07:11 )             30.7        01-30    137  |  97  |  26[H]  ----------------------------<  79  4.1   |  30  |  0.84    Ca    8.3[L]      30 Jan 2025 07:13    TPro  6.0  /  Alb  2.5[L]  /  TBili  0.4  /  DBili  x   /  AST  14  /  ALT  8[L]  /  AlkPhos  80  01-30    WBC count:   4.18 <<== ,  5.81 <<== ,  6.16 <<==   Hemoglobin:   9.2 <<==,  11.1 <<==,  11.1 <<==  platelets:  199 <==, 225 <==, 253 <==    Creatinine:  0.84  <<==, 1.08  <<==, 0.93  <<==  Sodium:   137  <==, 141  <==, 141  <==       AST:          14(01-30) <== , 15(01-27) <== , 24(01-27) <==      ALT:        8(01-30)  <== , 11(01-27)  <== , 15(01-27)  <==      AP:        80(01-30)  <=, 109(01-27)  <=, 121(01-27)  <=     Bili:        0.4(01-30)  <=, 0.3(01-27)  <=, 0.3(01-27)  <=    ____________________________    M I C R O B I O L O G Y :    Culture - Blood (collected 27 Jan 2025 22:17)  Source: .Blood BLOOD  Preliminary Report (30 Jan 2025 04:01):    No growth at 48 Hours    Culture - Blood (collected 27 Jan 2025 22:10)  Source: .Blood BLOOD  Preliminary Report (30 Jan 2025 04:01):    No growth at 48 Hours            ^^^ Inflammatory markers :  ^^^  C R P :          53 (01-27-25)  <<--          E S R :        89 (01-27-25)     D-Dimer Assay, Quantitative: 1173: (01.29.25 @ 07:03)    < from: TTE W or WO Ultrasound Enhancing Agent (01.28.25 @ 13:55) >  CONCLUSIONS:    1. Left ventricular cavity is small. Left ventricular wall thickness is normal. Left ventricular systolic function is normal with an ejection fraction of 73 % by Rios's method of disks.   2. Mildly enlarged right ventricular cavity size, with normal wall thickness, and normal right ventricular systolic function.   3. Moderate to severe aortic stenosis.   4. Moderate tricuspid regurgitation.   5. Estimated pulmonary artery systolic pressure is 75 mmHg, consistent with severe pulmonary hypertension.   6. No pericardial effusion seen.   7. Compared to the transthoracic echocardiogram performed on 4/12/2023, there have been no significant interval changes.  < end of copied text >    < from: CT Angio Chest PE Protocol w/ IV Cont (01.29.25 @ 08:47) >  IMPRESSION:  No pulmonary embolism.  Right upper lobe groundglass opacities and right lower lobe nodular   opacities are likely infectious/inflammatory.  Small bilateral pleural effusions.  < end of copied text >    < from: [facility] Duplex Lower Ext Vein Scan, Bilat (01.27.25 @ 20:06) >  IMPRESSION:  No evidence of deep venous thrombosis in either lower extremity.  < end of copied text >    < from: Xray Knee 3 Views, Right (01.27.25 @ 23:18) >  IMPRESSION:  1.  Right longstem femoral revision hardware with lucency surrounding the   hardware and marked subsidence in keeping with loosening.  2.  Lucency at the lateral cortex of the proximal femur may reflect   postoperative change versus osteolysis.  3.  Pseudoarticulation between the proximal femur and right iliac bone  < end of copied text >    < from: VA Duplex Lower Ext Vein Scan, Bilat (01.27.25 @ 20:06) >  IMPRESSION:  No evidence of deep venous thrombosis in either lower extremity.  < end of copied text >    < from: TTE W or WO Ultrasound Enhancing Agent (06.17.24 @ 17:12) >  CONCLUSIONS:    1. Left ventricular cavity is normal in size. Left ventricular wall thickness is normal. Left ventricular systolic function is normal with an ejection fraction of 64 % by Rios's method of disks. There are no regional wall motion abnormalities seen.   2. There is mild (grade 1) left ventricular diastolic dysfunction.   3. Normal right ventricular cavity size and normal systolic function.   4. Structurally normal mitral valve with normal leaflet excursion. There is calcification of the mitral valve annulus. There is mild mitral regurgitation.   5. The aortic valve anatomy cannot be determined with reduced systolic excursion. There is calcification of the aortic valve leaflets. There is severe aortic stenosis. The peak transaortic velocity is 3.94 m/s, peak transaortic gradient is 62.1 mmHg and mean transaortic gradient is 32.0 mmHg with an LVOT/aortic valve VTI ratio of 0.22. The aorticvalve area is estimated at 0.51 cm² by the continuity equation. There is mild to moderate aortic regurgitation.   6. The left atrium is mildly dilated with an indexed volume of 41 ml/m².   7. No prior echocardiogram is available for comparison.  < end of copied text >    ___________________________________________________________________________________  ===============>>  A S S E S S M E N T   A N D   P L A N <<===============  ------------------------------------------------------------------------------------------    79F w/ hx of ETOH in past, PAD w/ b/l carotid artery stenosis, HFpEF EF 64% w/ severe AS, COPD, HTN, CAD, RLE DVT 8/2023 w/ hx of R hip infection p/w worsening R hip pain     Problem/Plan - 1:  ·  Problem: right hip pain.   ·  Plan: Patient endorsing severe R hip pain w/o inciting fall or traumatic event. 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 06/2024 admission with possible joint infection. No clear signs of infection currently and exam is equivocal. Given hx, will eval further for infection.   -Pain control with Dilaudid 0.5mg IV Q6hrs PRN severe pain overnight, low threshold for pain consult, as per prior admission there is some substance use history.     >> pain management consult appreciated   -Bowel regimen  -ESR CRP as above   -follow BCxs   - Ortho following      -on chronic suppressive antibiotics    -Trend wbc, fever curve can consider ID consult pending cultures..   - patient may be interested in surgery > ortho to follow up     Problem/Plan - 2:  ·  Problem: acute exacerbation of Chronic heart failure with preserved ejection fraction (HFpEF), edema, elevated BNP     in patient with severe Aortic stenosis as above   repeat TTE as above with AS, MR, PAH  diuretics per cardio   -Daily weight and I/O  cardio following   change to Xarelto 2.5 mg BID per cardio    CTA and Duplex negative for VTE      Problem/Plan - 3:  ·  Problem: COPD  ·  Plan: Notable wheeze on exam on presentation   ? has 02 PRN at home. Patient smoking tobacco, not clarifying how much.  -Duonebs Q6hrs  -Cont. Symbicort BID  -Cont. Spiriva    ## pneumonia on CT as above >> started on antibiotics >> monitor   - pulm evaluation as needed : house     Problem/Plan - 4:  ·  Problem: CAD (coronary artery disease).   ·  Plan: Patient states she does not take aspirin or atorvastatin at home.  cardio following, appreciated     Problem/Plan - 5:  ·  Problem: EtOH dependence.   ·  Plan: Hx of ETOH dependence and substance use in past as per prior notes. Patient denies any recent ETOH use.  -Low threshold to order CIWA  -Will order thiamine for now.     Problem/Plan - 6:  ·  Problem: Severe aortic stenosis.   ·  Plan: hx of severe AS, note LE edema  repeat echo as above : unchanged   cardio appreciated     Problem/Plan - 7:  ·  Problem: Essential hypertension.   ·  Plan: -Trend BP  -Cont. Clonidine BID  -Patient does not recall taking Nifedipine, can resume prior dose if BPs uncontrolled.     Problem/Plan - 8:  ·  Problem: Prophylactic measure.   ·  Plan: DVT PPx  xarelto   --------------------------------------------  Case discussed with patient, Nurse Practitioner..   Education given on findings and plan of care  ___________________________  H. GONZALO Perez.  Pager: [phone]       _________________________________________________________________________________________  ========>>  M E D I C A L   A T T E N D I N G    F O L L O W  U P  N O T E  <<=========  -----------------------------------------------------------------------------------------------------    - Patient seen and examined by me earlier today.   - In summary,  DAQUAN MARTINEZ is a 79y year old woman admitted with hip pain   - Patient today overall doing fairly, still complains of a ot of pain.. patient reprotedly declining other treatments including nebs, diuretics... ( on my presentation. patient was fully resting comfortably in bed, upon waking, kept repeating pain medication requests: seeking behaviour )      patient care discussed with RN as well and will reach out to daughter as well     ==================>> REVIEW OF SYSTEM <<=================    GEN: no fever, no chills, pain in right hip area..   RESP: no SOB, no cough, no sputum  CVS: no chest pain, no palpitations  GI: no abdominal pain, no nausea  : no dysuria, no frequency  Neuro: no headache, no dizziness    ==================>> PHYSICAL EXAM <<=================    GEN: A&O X 3 , NAD , comfortable, as above ..   HEENT: NCAT, PERRL, MMM, hearing intact  CVS: S1S2 , regular , No M/R/G appreciated  PULM: scattered wheez   ABD.: soft. non tender, non distended,  Extrem: intact pulses , leg edema and stasis changes , wounds dressed        ( Note written / Date of service 01-30-25 ( This is certified to be the same as "ENTERED" date above ( for billing purposes)))    ==================>> MEDICATIONS <<====================    albuterol/ipratropium for Nebulization 3 milliLiter(s) Nebulizer every 6 hours  atorvastatin 40 milliGRAM(s) Oral at bedtime  cefepime   IVPB 500 milliGRAM(s) IV Intermittent every 12 hours  cloNIDine 0.1 milliGRAM(s) Oral every 12 hours  collagenase Ointment 1 Application(s) Topical daily  doxycycline monohydrate Capsule 100 milliGRAM(s) Oral every 12 hours  fluticasone propionate/ salmeterol 250-50 MICROgram(s) Diskus 1 Dose(s) Inhalation two times a day  furosemide   Injectable 20 milliGRAM(s) IV Push two times a day  gabapentin 400 milliGRAM(s) Oral two times a day  morphine ER Tablet 15 milliGRAM(s) Oral every 12 hours  polyethylene glycol 3350 17 Gram(s) Oral daily  rivaroxaban 2.5 milliGRAM(s) Oral two times a day  senna 2 Tablet(s) Oral at bedtime  thiamine 100 milliGRAM(s) Oral daily  tiotropium 2.5 MICROgram(s) Inhaler 2 Puff(s) Inhalation daily    MEDICATIONS  (PRN):  acetaminophen     Tablet .. 650 milliGRAM(s) Oral every 6 hours PRN Temp greater or equal to 38C (100.4F), Mild Pain (1 - 3)  melatonin 3 milliGRAM(s) Oral at bedtime PRN Insomnia  naloxone Injectable 0.2 milliGRAM(s) IV Push every 3 minutes PRN respiratory depression/ suspected opioid OD  nicotine  Polacrilex Gum 4 milliGRAM(s) Oral four times a day PRN Smoking Cessation  ondansetron Injectable 4 milliGRAM(s) IV Push every 8 hours PRN Nausea and/or Vomiting  oxyCODONE    IR 5 milliGRAM(s) Oral every 4 hours PRN Severe Pain (7 - 10)    ___________  Active diet:  Diet, DASH/TLC:   Sodium & Cholesterol Restricted  ___________________    ==================>> VITAL SIGNS <<==================  Height (cm): 154.9  Weight (kg): 63.5  BMI (kg/m2): 26.5  Vital Signs Last 24 HrsT(C): 36.3 (01-30-25 @ 12:34)  T(F): 97.3 (01-30-25 @ 12:34), Max: 98.5 (01-29-25 @ 21:11)  HR: 73 (01-30-25 @ 12:34) (73 - 82)  BP: 127/70 (01-30-25 @ 12:34)  RR: 18 (01-30-25 @ 12:34) (18 - 18)  SpO2: 96% (01-30-25 @ 12:34) (92% - 97%)      POCT Blood Glucose.: 118 mg/dL (29 Jan 2025 17:06)     ==================>> LAB AND IMAGING <<==================                        9.2    4.18  )-----------( 199      ( 30 Jan 2025 07:11 )             30.7        01-30    137  |  97  |  26[H]  ----------------------------<  79  4.1   |  30  |  0.84    Ca    8.3[L]      30 Jan 2025 07:13    TPro  6.0  /  Alb  2.5[L]  /  TBili  0.4  /  DBili  x   /  AST  14  /  ALT  8[L]  /  AlkPhos  80  01-30    WBC count:   4.18 <<== ,  5.81 <<== ,  6.16 <<==   Hemoglobin:   9.2 <<==,  11.1 <<==,  11.1 <<==  platelets:  199 <==, 225 <==, 253 <==    Creatinine:  0.84  <<==, 1.08  <<==, 0.93  <<==  Sodium:   137  <==, 141  <==, 141  <==       AST:          14(01-30) <== , 15(01-27) <== , 24(01-27) <==      ALT:        8(01-30)  <== , 11(01-27)  <== , 15(01-27)  <==      AP:        80(01-30)  <=, 109(01-27)  <=, 121(01-27)  <=     Bili:        0.4(01-30)  <=, 0.3(01-27)  <=, 0.3(01-27)  <=    ____________________________    M I C R O B I O L O G Y :    Culture - Blood (collected 27 Jan 2025 22:17)  Source: .Blood BLOOD  Preliminary Report (30 Jan 2025 04:01):    No growth at 48 Hours    Culture - Blood (collected 27 Jan 2025 22:10)  Source: .Blood BLOOD  Preliminary Report (30 Jan 2025 04:01):    No growth at 48 Hours    < from: MR Ankle w/wo IV Cont, Right (01.29.25 @ 18:54) >  IMPRESSION:  Lateral soft tissue ankle wound with osteitis of the adjacent fibula.   This area is susceptible to developing osteomyelitis.   No fluid collection.  < end of copied text >    ^^^ Inflammatory markers :  ^^^  C R P :          53 (01-27-25)  <<--          E S R :        89 (01-27-25)     D-Dimer Assay, Quantitative: 1173: (01.29.25 @ 07:03)    < from: TTE W or WO Ultrasound Enhancing Agent (01.28.25 @ 13:55) >  CONCLUSIONS:    1. Left ventricular cavity is small. Left ventricular wall thickness is normal. Left ventricular systolic function is normal with an ejection fraction of 73 % by Rios's method of disks.   2. Mildly enlarged right ventricular cavity size, with normal wall thickness, and normal right ventricular systolic function.   3. Moderate to severe aortic stenosis.   4. Moderate tricuspid regurgitation.   5. Estimated pulmonary artery systolic pressure is 75 mmHg, consistent with severe pulmonary hypertension.   6. No pericardial effusion seen.   7. Compared to the transthoracic echocardiogram performed on 4/12/2023, there have been no significant interval changes.  < end of copied text >    < from: CT Angio Chest PE Protocol w/ IV Cont (01.29.25 @ 08:47) >  IMPRESSION:  No pulmonary embolism.  Right upper lobe groundglass opacities and right lower lobe nodular   opacities are likely infectious/inflammatory.  Small bilateral pleural effusions.  < end of copied text >    < from: VA Duplex Lower Ext Vein Scan, Bilat (01.27.25 @ 20:06) >  IMPRESSION:  No evidence of deep venous thrombosis in either lower extremity.  < end of copied text >    < from: Xray Knee 3 Views, Right (01.27.25 @ 23:18) >  IMPRESSION:  1.  Right longstem femoral revision hardware with lucency surrounding the   hardware and marked subsidence in keeping with loosening.  2.  Lucency at the lateral cortex of the proximal femur may reflect   postoperative change versus osteolysis.  3.  Pseudoarticulation between the proximal femur and right iliac bone  < end of copied text >    < from: VA Duplex Lower Ext Vein Scan, Bilat (01.27.25 @ 20:06) >  IMPRESSION:  No evidence of deep venous thrombosis in either lower extremity.  < end of copied text >    < from: TTE W or WO Ultrasound Enhancing Agent (06.17.24 @ 17:12) >  CONCLUSIONS:    1. Left ventricular cavity is normal in size. Left ventricular wall thickness is normal. Left ventricular systolic function is normal with an ejection fraction of 64 % by Rios's method of disks. There are no regional wall motion abnormalities seen.   2. There is mild (grade 1) left ventricular diastolic dysfunction.   3. Normal right ventricular cavity size and normal systolic function.   4. Structurally normal mitral valve with normal leaflet excursion. There is calcification of the mitral valve annulus. There is mild mitral regurgitation.   5. The aortic valve anatomy cannot be determined with reduced systolic excursion. There is calcification of the aortic valve leaflets. There is severe aortic stenosis. The peak transaortic velocity is 3.94 m/s, peak transaortic gradient is 62.1 mmHg and mean transaortic gradient is 32.0 mmHg with an LVOT/aortic valve VTI ratio of 0.22. The aorticvalve area is estimated at 0.51 cm² by the continuity equation. There is mild to moderate aortic regurgitation.   6. The left atrium is mildly dilated with an indexed volume of 41 ml/m².   7. No prior echocardiogram is available for comparison.  < end of copied text >    ___________________________________________________________________________________  ===============>>  A S S E S S M E N T   A N D   P L A N <<===============  ------------------------------------------------------------------------------------------    79F w/ hx of ETOH in past, PAD w/ b/l carotid artery stenosis, HFpEF EF 64% w/ severe AS, COPD, HTN, CAD, RLE DVT 8/2023 w/ hx of R hip infection p/w worsening R hip pain     Problem/Plan - 1:  ·  Problem: right hip pain.   ·  Plan: Patient endorsing severe R hip pain w/o inciting fall or traumatic event. 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 06/2024 admission with possible joint infection. No clear signs of infection currently and exam is equivocal. -Pain control with Dilaudid 0.5mg IV Q6hrs PRN severe pain overnight, low threshold for pain consult, as per prior admission there is some substance use history.     >> pain management consult appreciated   -Bowel regimen  -ESR CRP as above   -follow BCxs   - Ortho following      -on chronic suppressive antibiotics    -Trend wbc, fever curve can consider ID consult pending cultures..   - patient may be interested in surgery > ortho to follow up  - ID consulted, pending    ## additional osteomyelitis of ankle     podiatry following, appreciated    ID evaluation for antibiotics Rx     otherwise as above    local wound care     Problem/Plan - 2:  ·  Problem: acute exacerbation of Chronic heart failure with preserved ejection fraction (HFpEF), edema, elevated BNP     in patient with severe Aortic stenosis as above   repeat TTE as above with AS, MR, PAH  diuretics per cardio   -Daily weight and I/O  cardio following   Xarelto 2.5 mg BID per cardio    CTA and Duplex negative for VTE      Problem/Plan - 3:  ·  Problem: COPD  ·  Plan: Notable wheeze on exam on presentation   ? has 02 PRN at home. Patient smoking tobacco, not clarifying how much.  -Duonebs Q6hrs  -Cont. Symbicort BID  -Cont. Spiriva    ## pneumonia on CT as above >> started on antibiotics >> monitor   - pulm evaluation as needed : house    ID evaluation     patient intermittently has been refusing nebs and diuretics     Problem/Plan - 4:  ·  Problem: CAD (coronary artery disease).   ·  Plan: Patient states she does not take aspirin or atorvastatin at home.  cardio following, appreciated     Problem/Plan - 5:  ·  Problem: EtOH dependence.   ·  Plan: Hx of ETOH dependence and substance use in past as per prior notes. Patient denies any recent ETOH use.  -Low threshold to order CIWA  -Will order thiamine for now.     Problem/Plan - 6:  ·  Problem: Severe aortic stenosis.   ·  Plan: hx of severe AS, note LE edema  repeat echo as above : unchanged   cardio appreciated     Problem/Plan - 7:  ·  Problem: Essential hypertension.   ·  Plan: -Trend BP  -Cont. Clonidine BID  -Patient does not recall taking Nifedipine, can resume prior dose if BPs uncontrolled.     Problem/Plan - 8:  ·  Problem: Prophylactic measure.   ·  Plan: DVT PPx  xarelto     --------------------------------------------  Case discussed with patient, RN.. will reach out to daughter   Education given on findings and plan of care  ___________________________  MARIZOL Perez D.O.  Pager: [phone]

## 2025-01-31 LAB
-  AMIKACIN: SIGNIFICANT CHANGE UP
-  AZTREONAM: SIGNIFICANT CHANGE UP
-  CEFEPIME: SIGNIFICANT CHANGE UP
-  CEFTAZIDIME: SIGNIFICANT CHANGE UP
-  CIPROFLOXACIN: SIGNIFICANT CHANGE UP
-  CLINDAMYCIN: SIGNIFICANT CHANGE UP
-  CLINDAMYCIN: SIGNIFICANT CHANGE UP
-  DAPTOMYCIN: SIGNIFICANT CHANGE UP
-  DAPTOMYCIN: SIGNIFICANT CHANGE UP
-  ERYTHROMYCIN: SIGNIFICANT CHANGE UP
-  ERYTHROMYCIN: SIGNIFICANT CHANGE UP
-  GENTAMICIN: SIGNIFICANT CHANGE UP
-  GENTAMICIN: SIGNIFICANT CHANGE UP
-  IMIPENEM: SIGNIFICANT CHANGE UP
-  LEVOFLOXACIN: SIGNIFICANT CHANGE UP
-  LINEZOLID: SIGNIFICANT CHANGE UP
-  LINEZOLID: SIGNIFICANT CHANGE UP
-  MEROPENEM: SIGNIFICANT CHANGE UP
-  OXACILLIN: SIGNIFICANT CHANGE UP
-  OXACILLIN: SIGNIFICANT CHANGE UP
-  PENICILLIN: SIGNIFICANT CHANGE UP
-  PENICILLIN: SIGNIFICANT CHANGE UP
-  PIPERACILLIN/TAZOBACTAM: SIGNIFICANT CHANGE UP
-  RIFAMPIN: SIGNIFICANT CHANGE UP
-  RIFAMPIN: SIGNIFICANT CHANGE UP
-  TETRACYCLINE: SIGNIFICANT CHANGE UP
-  TETRACYCLINE: SIGNIFICANT CHANGE UP
-  TRIMETHOPRIM/SULFAMETHOXAZOLE: SIGNIFICANT CHANGE UP
-  TRIMETHOPRIM/SULFAMETHOXAZOLE: SIGNIFICANT CHANGE UP
-  VANCOMYCIN: SIGNIFICANT CHANGE UP
-  VANCOMYCIN: SIGNIFICANT CHANGE UP
HCT VFR BLD CALC: 35 % — SIGNIFICANT CHANGE UP (ref 34.5–45)
HGB BLD-MCNC: 10.4 G/DL — LOW (ref 11.5–15.5)
MCHC RBC-ENTMCNC: 29.7 G/DL — LOW (ref 32–36)
MCHC RBC-ENTMCNC: 29.9 PG — SIGNIFICANT CHANGE UP (ref 27–34)
MCV RBC AUTO: 100.6 FL — HIGH (ref 80–100)
METHOD TYPE: SIGNIFICANT CHANGE UP
NRBC # BLD: 0 /100 WBCS — SIGNIFICANT CHANGE UP (ref 0–0)
NRBC BLD-RTO: 0 /100 WBCS — SIGNIFICANT CHANGE UP (ref 0–0)
PLATELET # BLD AUTO: 214 K/UL — SIGNIFICANT CHANGE UP (ref 150–400)
RBC # BLD: 3.48 M/UL — LOW (ref 3.8–5.2)
RBC # FLD: 16.7 % — HIGH (ref 10.3–14.5)
WBC # BLD: 4.7 K/UL — SIGNIFICANT CHANGE UP (ref 3.8–10.5)
WBC # FLD AUTO: 4.7 K/UL — SIGNIFICANT CHANGE UP (ref 3.8–10.5)

## 2025-01-31 PROCEDURE — 99222 1ST HOSP IP/OBS MODERATE 55: CPT

## 2025-01-31 PROCEDURE — G0545: CPT

## 2025-01-31 RX ORDER — VANCOMYCIN HCL IN 5 % DEXTROSE 1.5G/250ML
PLASTIC BAG, INJECTION (ML) INTRAVENOUS
Refills: 0 | Status: DISCONTINUED | OUTPATIENT
Start: 2025-01-31 | End: 2025-02-03

## 2025-01-31 RX ORDER — VANCOMYCIN HCL IN 5 % DEXTROSE 1.5G/250ML
1250 PLASTIC BAG, INJECTION (ML) INTRAVENOUS ONCE
Refills: 0 | Status: COMPLETED | OUTPATIENT
Start: 2025-01-31 | End: 2025-01-31

## 2025-01-31 RX ORDER — FUROSEMIDE 10 MG/ML
40 INJECTION INTRAMUSCULAR; INTRAVENOUS DAILY
Refills: 0 | Status: DISCONTINUED | OUTPATIENT
Start: 2025-01-31 | End: 2025-02-03

## 2025-01-31 RX ORDER — ACETAMINOPHEN 500 MG/5ML
1000 LIQUID (ML) ORAL ONCE
Refills: 0 | Status: COMPLETED | OUTPATIENT
Start: 2025-01-31 | End: 2025-01-31

## 2025-01-31 RX ORDER — CEFEPIME 2 G/20ML
1000 INJECTION, POWDER, FOR SOLUTION INTRAVENOUS EVERY 8 HOURS
Refills: 0 | Status: DISCONTINUED | OUTPATIENT
Start: 2025-01-31 | End: 2025-02-10

## 2025-01-31 RX ORDER — VANCOMYCIN HCL IN 5 % DEXTROSE 1.5G/250ML
1250 PLASTIC BAG, INJECTION (ML) INTRAVENOUS EVERY 24 HOURS
Refills: 0 | Status: DISCONTINUED | OUTPATIENT
Start: 2025-02-01 | End: 2025-02-03

## 2025-01-31 RX ADMIN — Medication 1000 MILLIGRAM(S): at 12:43

## 2025-01-31 RX ADMIN — OXYCODONE HYDROCHLORIDE 5 MILLIGRAM(S): 30 TABLET ORAL at 01:21

## 2025-01-31 RX ADMIN — Medication 15 MILLIGRAM(S): at 05:51

## 2025-01-31 RX ADMIN — CEFEPIME 100 MILLIGRAM(S): 2 INJECTION, POWDER, FOR SOLUTION INTRAVENOUS at 22:53

## 2025-01-31 RX ADMIN — Medication 400 MILLIGRAM(S): at 12:28

## 2025-01-31 RX ADMIN — Medication 2 TABLET(S): at 22:07

## 2025-01-31 RX ADMIN — ATORVASTATIN CALCIUM 40 MILLIGRAM(S): 80 TABLET, FILM COATED ORAL at 22:07

## 2025-01-31 RX ADMIN — OXYCODONE HYDROCHLORIDE 5 MILLIGRAM(S): 30 TABLET ORAL at 19:41

## 2025-01-31 RX ADMIN — OXYCODONE HYDROCHLORIDE 5 MILLIGRAM(S): 30 TABLET ORAL at 02:21

## 2025-01-31 RX ADMIN — FUROSEMIDE 20 MILLIGRAM(S): 10 INJECTION INTRAMUSCULAR; INTRAVENOUS at 05:52

## 2025-01-31 RX ADMIN — RIVAROXABAN 2.5 MILLIGRAM(S): 10 TABLET, FILM COATED ORAL at 17:42

## 2025-01-31 RX ADMIN — Medication 1 APPLICATION(S): at 18:12

## 2025-01-31 RX ADMIN — CEFEPIME 100 MILLIGRAM(S): 2 INJECTION, POWDER, FOR SOLUTION INTRAVENOUS at 14:02

## 2025-01-31 RX ADMIN — Medication 100 MILLIGRAM(S): at 05:52

## 2025-01-31 RX ADMIN — Medication 166.67 MILLIGRAM(S): at 15:26

## 2025-01-31 RX ADMIN — Medication 1 DOSE(S): at 17:43

## 2025-01-31 RX ADMIN — CEFEPIME 100 MILLIGRAM(S): 2 INJECTION, POWDER, FOR SOLUTION INTRAVENOUS at 06:06

## 2025-01-31 RX ADMIN — Medication 1 APPLICATION(S): at 12:30

## 2025-01-31 RX ADMIN — Medication 15 MILLIGRAM(S): at 07:02

## 2025-01-31 RX ADMIN — TIOTROPIUM BROMIDE INHALATION SPRAY 2 PUFF(S): 3.12 SPRAY, METERED RESPIRATORY (INHALATION) at 12:28

## 2025-01-31 RX ADMIN — FUROSEMIDE 40 MILLIGRAM(S): 10 INJECTION INTRAMUSCULAR; INTRAVENOUS at 12:29

## 2025-01-31 RX ADMIN — OXYCODONE HYDROCHLORIDE 5 MILLIGRAM(S): 30 TABLET ORAL at 08:13

## 2025-01-31 RX ADMIN — Medication 0.1 MILLIGRAM(S): at 05:53

## 2025-01-31 RX ADMIN — Medication 1 DOSE(S): at 05:54

## 2025-01-31 RX ADMIN — GABAPENTIN 400 MILLIGRAM(S): 400 CAPSULE ORAL at 17:42

## 2025-01-31 RX ADMIN — Medication 15 MILLIGRAM(S): at 17:42

## 2025-01-31 RX ADMIN — OXYCODONE HYDROCHLORIDE 5 MILLIGRAM(S): 30 TABLET ORAL at 15:26

## 2025-01-31 RX ADMIN — IPRATROPIUM BROMIDE AND ALBUTEROL SULFATE 3 MILLILITER(S): .5; 2.5 SOLUTION RESPIRATORY (INHALATION) at 05:52

## 2025-01-31 RX ADMIN — GABAPENTIN 400 MILLIGRAM(S): 400 CAPSULE ORAL at 05:52

## 2025-01-31 RX ADMIN — IPRATROPIUM BROMIDE AND ALBUTEROL SULFATE 3 MILLILITER(S): .5; 2.5 SOLUTION RESPIRATORY (INHALATION) at 12:29

## 2025-01-31 RX ADMIN — IPRATROPIUM BROMIDE AND ALBUTEROL SULFATE 3 MILLILITER(S): .5; 2.5 SOLUTION RESPIRATORY (INHALATION) at 17:42

## 2025-01-31 RX ADMIN — Medication 15 MILLIGRAM(S): at 18:42

## 2025-01-31 RX ADMIN — OXYCODONE HYDROCHLORIDE 5 MILLIGRAM(S): 30 TABLET ORAL at 16:26

## 2025-01-31 RX ADMIN — Medication 0.1 MILLIGRAM(S): at 17:42

## 2025-01-31 RX ADMIN — IPRATROPIUM BROMIDE AND ALBUTEROL SULFATE 3 MILLILITER(S): .5; 2.5 SOLUTION RESPIRATORY (INHALATION) at 23:54

## 2025-01-31 RX ADMIN — RIVAROXABAN 2.5 MILLIGRAM(S): 10 TABLET, FILM COATED ORAL at 05:55

## 2025-01-31 RX ADMIN — OXYCODONE HYDROCHLORIDE 5 MILLIGRAM(S): 30 TABLET ORAL at 09:13

## 2025-01-31 NOTE — PROGRESS NOTE ADULT - SUBJECTIVE AND OBJECTIVE BOX
Cardiovascular Disease Progress Note    Overnight events: No acute events overnight.  no new cardiac sx  Otherwise review of systems negative    Objective Findings:  T(C): 36.6 (01-31-25 @ 05:01), Max: 36.6 (01-30-25 @ 21:10)  HR: 67 (01-31-25 @ 05:01) (67 - 74)  BP: 173/76 (01-31-25 @ 05:01) (127/70 - 173/76)  RR: 18 (01-31-25 @ 05:01) (18 - 18)  SpO2: 97% (01-31-25 @ 05:01) (96% - 97%)  Wt(kg): --  Daily     Daily       Physical Exam:  Gen: NAD  HEENT: EOMI  CV: RRR, normal S1 + S2, 2/6 gardenia  Lungs: CTAB  Abd: soft, non-tender  Ext: No edema    Telemetry:    Laboratory Data:                        9.2    4.18  )-----------( 199      ( 30 Jan 2025 07:11 )             30.7     01-30    137  |  97  |  26[H]  ----------------------------<  79  4.1   |  30  |  0.84    Ca    8.3[L]      30 Jan 2025 07:13    TPro  6.0  /  Alb  2.5[L]  /  TBili  0.4  /  DBili  x   /  AST  14  /  ALT  8[L]  /  AlkPhos  80  01-30              Inpatient Medications:  MEDICATIONS  (STANDING):  albuterol/ipratropium for Nebulization 3 milliLiter(s) Nebulizer every 6 hours  atorvastatin 40 milliGRAM(s) Oral at bedtime  cefepime   IVPB 500 milliGRAM(s) IV Intermittent every 12 hours  cloNIDine 0.1 milliGRAM(s) Oral every 12 hours  collagenase Ointment 1 Application(s) Topical daily  doxycycline monohydrate Capsule 100 milliGRAM(s) Oral every 12 hours  fluticasone propionate/ salmeterol 250-50 MICROgram(s) Diskus 1 Dose(s) Inhalation two times a day  furosemide   Injectable 20 milliGRAM(s) IV Push two times a day  gabapentin 400 milliGRAM(s) Oral two times a day  morphine ER Tablet 15 milliGRAM(s) Oral every 12 hours  polyethylene glycol 3350 17 Gram(s) Oral daily  rivaroxaban 2.5 milliGRAM(s) Oral two times a day  senna 2 Tablet(s) Oral at bedtime  thiamine 100 milliGRAM(s) Oral daily  tiotropium 2.5 MICROgram(s) Inhaler 2 Puff(s) Inhalation daily      Assessment:  79F w/ hx of ETOH in past, PAD w/ b/l carotid artery stenosis, HFpEF EF 64% w/ severe AS, COPD, HTN, CAD, RLE DVT 8/2023 w/ hx of R hip infection p/w worsening R hip pain and hypoxic resp failure    Recs:  cardiac stable  no e/o acs  cw antiplatelet, statin and antianginals for cad/stent. denies chest pain. hold off on ischemic eval for now   vol status improving =cw lasix 40mg po qd. gentle diuresis as patient is preload dependent in setting of AS  s/p echo, results noted. mod to severe AS. doubt would be a TAVR candidate in setting of chronic hip infection and risk of endocarditis  consider pulmonary consult to assist with tx of copd  cw xarelto 2.5mg bid for PAD dosing and "ppx for hx of VTE"   pain control  f/u ortho recs  will follow        Over 25 minutes spent on total encounter; more than 50% of the visit was spent counseling and/or coordinating care by the attending physician.      Chaka Lawrence MD   Cardiovascular Disease  [phone]

## 2025-01-31 NOTE — CHART NOTE - NSCHARTNOTEFT_GEN_A_CORE
Patient requested to speak to orthopedic surgery regarding surgical treatment of her periprosthetic hip infection. Discussed that at this point, the only recommended surgical treatment would be a Girdlestone procedure, which is removing the implants without replacing them. Patient rejected the procedure at this time. Recommend to continue suppressive antibiotic therapy. Can follow up with Dr. Derek Bailey outpatient after discharge for further discussion. Patient requested to speak to orthopedic surgery regarding surgical treatment of her periprosthetic hip infection. Discussed that at this point, the only recommended surgical treatment would be a Girdlestone procedure, which is removing the implants without replacing them. Patient rejected the procedure at this time. Recommend to continue suppressive antibiotic therapy. Can follow up with Dr. Derek Bailey outpatient after discharge for further discussion.    MRI of L ankle was also performed given patient has a wound on the lateral malleoli and showed early signs of osteomyelitis. Would recommend IV antibiotics per ID and wound care management. No orthopedic surgical intervention is indicated at this time.

## 2025-01-31 NOTE — PROGRESS NOTE ADULT - SUBJECTIVE AND OBJECTIVE BOX
Patient is a 79y old  Female who presents with a chief complaint of Worsening R hip pain (31 Jan 2025 13:17)       INTERVAL HPI/OVERNIGHT EVENTS:  Patient seen and evaluated at bedside.  Pt is resting comfortable in NAD. Denies N/V/F/C.      Allergies    No Known Allergies    Intolerances        Vital Signs Last 24 Hrs  T(C): 36.8 (31 Jan 2025 12:09), Max: 36.8 (31 Jan 2025 12:09)  T(F): 98.2 (31 Jan 2025 12:09), Max: 98.2 (31 Jan 2025 12:09)  HR: 79 (31 Jan 2025 12:09) (67 - 79)  BP: 124/64 (31 Jan 2025 12:09) (124/64 - 173/76)  BP(mean): --  RR: 18 (31 Jan 2025 12:09) (18 - 18)  SpO2: 95% (31 Jan 2025 12:09) (95% - 97%)    Parameters below as of 31 Jan 2025 12:09  Patient On (Oxygen Delivery Method): nasal cannula        LABS:                        10.4   4.70  )-----------( 214      ( 31 Jan 2025 06:54 )             35.0     01-30    137  |  97  |  26[H]  ----------------------------<  79  4.1   |  30  |  0.84    Ca    8.3[L]      30 Jan 2025 07:13    TPro  6.0  /  Alb  2.5[L]  /  TBili  0.4  /  DBili  x   /  AST  14  /  ALT  8[L]  /  AlkPhos  80  01-30      Urinalysis Basic - ( 30 Jan 2025 07:13 )    Color: x / Appearance: x / SG: x / pH: x  Gluc: 79 mg/dL / Ketone: x  / Bili: x / Urobili: x   Blood: x / Protein: x / Nitrite: x   Leuk Esterase: x / RBC: x / WBC x   Sq Epi: x / Non Sq Epi: x / Bacteria: x      CAPILLARY BLOOD GLUCOSE          Lower Extremity Physical Exam:  Vasular: DP/PT 0/4  B/L, CFT < 3 seconds B/L, Temperature gradient warm to warm R, warm to cool L    Neuro: Epicritic sensation intact to the level of the ankle, B/L.  Musculoskeletal/Ortho: s/p R USAMA & revision 9/2022  Skin: R lateral malleolus wound to level of bone, fibrogranular wound bed, indurated borders, well circumscribed, no malodor or purulence.     RADIOLOGY & ADDITIONAL TESTS:

## 2025-01-31 NOTE — PROGRESS NOTE ADULT - ATTENDING COMMENTS
Seen at bedside. Offload heels and ankles with z-floats.  Santyl for wound care.  f/u ID and ortho recs

## 2025-01-31 NOTE — PROGRESS NOTE ADULT - ASSESSMENT
_________________________________________________________________________________________  ========>>  M E D I C A L   A T T E N D I N G    F O L L O W  U P  N O T E  <<=========  -----------------------------------------------------------------------------------------------------    - Patient seen and examined by me earlier today.   - Patient today overall doing fairly, still complains of pain.. patient educated on the medications she is already on including standing and PRN medications.. )    ==================>> REVIEW OF SYSTEM <<=================    GEN: no fever, no chills, pain in right hip area..   RESP: no SOB, no cough, no sputum  CVS: no chest pain, no palpitations  GI: no abdominal pain, no nausea  : no dysuria, no frequency  Neuro: no headache, no dizziness    ==================>> PHYSICAL EXAM <<=================    GEN: A&O X 3 , NAD , comfortable, as above ..   HEENT: NCAT, PERRL, MMM, hearing intact  CVS: S1S2 , regular , No M/R/G appreciated  PULM: scattered wheez >> decreased   ABD.: soft. non tender, non distended,  Extrem: intact pulses , leg edema decreased : stasis changes , wounds dressed        ( Note written / Date of service 01-31-25 ( This is certified to be the same as "ENTERED" date above ( for billing purposes)))    ==================>> MEDICATIONS <<====================    albuterol/ipratropium for Nebulization 3 milliLiter(s) Nebulizer every 6 hours  atorvastatin 40 milliGRAM(s) Oral at bedtime  cefepime   IVPB 1000 milliGRAM(s) IV Intermittent every 8 hours  chlorhexidine 2% Cloths 1 Application(s) Topical daily  cloNIDine 0.1 milliGRAM(s) Oral every 12 hours  collagenase Ointment 1 Application(s) Topical daily  fluticasone propionate/ salmeterol 250-50 MICROgram(s) Diskus 1 Dose(s) Inhalation two times a day  furosemide    Tablet 40 milliGRAM(s) Oral daily  gabapentin 400 milliGRAM(s) Oral two times a day  morphine ER Tablet 15 milliGRAM(s) Oral every 12 hours  polyethylene glycol 3350 17 Gram(s) Oral daily  rivaroxaban 2.5 milliGRAM(s) Oral two times a day  senna 2 Tablet(s) Oral at bedtime  thiamine 100 milliGRAM(s) Oral daily  tiotropium 2.5 MICROgram(s) Inhaler 2 Puff(s) Inhalation daily  vancomycin  IVPB        MEDICATIONS  (PRN):  acetaminophen     Tablet .. 650 milliGRAM(s) Oral every 6 hours PRN Temp greater or equal to 38C (100.4F), Mild Pain (1 - 3)  melatonin 3 milliGRAM(s) Oral at bedtime PRN Insomnia  naloxone Injectable 0.2 milliGRAM(s) IV Push every 3 minutes PRN respiratory depression/ suspected opioid OD  nicotine  Polacrilex Gum 4 milliGRAM(s) Oral four times a day PRN Smoking Cessation  ondansetron Injectable 4 milliGRAM(s) IV Push every 8 hours PRN Nausea and/or Vomiting  oxyCODONE    IR 5 milliGRAM(s) Oral every 4 hours PRN Severe Pain (7 - 10)    ___________  Active diet:  Diet, DASH/TLC:   Sodium & Cholesterol Restricted  ___________________    ==================>> VITAL SIGNS <<==================    Vital Signs Last 24 HrsT(C): 36.8 (01-31-25 @ 12:09)  T(F): 98.2 (01-31-25 @ 12:09), Max: 98.2 (01-31-25 @ 12:09)  HR: 79 (01-31-25 @ 12:09) (67 - 79)  BP: 124/64 (01-31-25 @ 12:09)  RR: 18 (01-31-25 @ 12:09) (18 - 18)  SpO2: 95% (01-31-25 @ 12:09) (95% - 97%)       ==================>> LAB AND IMAGING <<==================                        10.4   4.70  )-----------( 214      ( 31 Jan 2025 06:54 )             35.0        01-30    137  |  97  |  26[H]  ----------------------------<  79  4.1   |  30  |  0.84    Ca    8.3[L]      30 Jan 2025 07:13    TPro  6.0  /  Alb  2.5[L]  /  TBili  0.4  /  DBili  x   /  AST  14  /  ALT  8[L]  /  AlkPhos  80  01-30    WBC count:   4.70 <<== ,  4.18 <<== ,  5.81 <<== ,  6.16 <<==   Hemoglobin:   10.4 <<==,  9.2 <<==,  11.1 <<==,  11.1 <<==  platelets:  214 <==, 199 <==, 225 <==, 253 <==    Creatinine:  0.84  <<==, 1.08  <<==, 0.93  <<==  Sodium:   137  <==, 141  <==, 141  <==       AST:          14(01-30) <== , 15(01-27) <== , 24(01-27) <==      ALT:        8(01-30)  <== , 11(01-27)  <== , 15(01-27)  <==      AP:        80(01-30)  <=, 109(01-27)  <=, 121(01-27)  <=     Bili:        0.4(01-30)  <=, 0.3(01-27)  <=, 0.3(01-27)  <=    ____________________________    M I C R O B I O L O G Y :    Culture - Wound Aerobic/Anaerobic (collected 29 Jan 2025 07:19)  Source: Skin/Wound  Preliminary Report (31 Jan 2025 10:25):    Numerous Pseudomonas aeruginosa    Rare Methicillin Resistant Staphylococcus aureus  Organism: Pseudomonas aeruginosa  Methicillin resistant Staphylococcus aureus (31 Jan 2025 10:25)  Organism: Methicillin resistant Staphylococcus aureus (31 Jan 2025 10:25)    Sensitivities:      Method Type: DEVAN      -  Clindamycin: S <=0.25      -  Daptomycin: S 1      -  Erythromycin: R >4      -  Gentamicin: S <=4 Should not be used as monotherapy      -  Linezolid: S 2      -  Oxacillin: R >2      -  Penicillin: R >2      -  Rifampin: S <=1 Should not be used as monotherapy      -  Tetracycline: I 8      -  Trimethoprim/Sulfamethoxazole: S <=0.5/9.5      -  Vancomycin: S 1  Organism: Pseudomonas aeruginosa (31 Jan 2025 10:23)    Sensitivities:      Method Type: DEVAN      -  Amikacin: S <=16      -  Aztreonam: S <=4      -  Cefepime: S <=2      -  Ceftazidime: S 4      -  Ciprofloxacin: S <=0.25      -  Imipenem: S <=1      -  Levofloxacin: S <=0.5      -  Meropenem: S <=1      -  Piperacillin/Tazobactam: S <=8    Culture - Wound Aerobic/Anaerobic (collected 29 Jan 2025 07:19)  Source: Skin/Wound  Preliminary Report (31 Jan 2025 10:22):    Moderate Methicillin Resistant Staphylococcus aureus  Organism: Methicillin resistant Staphylococcus aureus (31 Jan 2025 10:22)  Organism: Methicillin resistant Staphylococcus aureus (31 Jan 2025 10:22)    Sensitivities:      Method Type: DEVAN      -  Clindamycin: S <=0.25      -  Daptomycin: S 1      -  Erythromycin: R >4      -  Gentamicin: S <=4 Should not be used as monotherapy      -  Linezolid: S 2      -  Oxacillin: R >2      -  Penicillin: R >2      -  Rifampin: S <=1 Should not be used as monotherapy      -  Tetracycline: I 8      -  Trimethoprim/Sulfamethoxazole: S <=0.5/9.5      -  Vancomycin: S 1    Culture - Blood (collected 27 Jan 2025 22:17)  Source: .Blood BLOOD  Preliminary Report (31 Jan 2025 04:01):    No growth at 72 Hours    Culture - Blood (collected 27 Jan 2025 22:10)  Source: .Blood BLOOD  Preliminary Report (31 Jan 2025 04:01):    No growth at 72 Hours      < from: TTE W or WO Ultrasound Enhancing Agent (01.28.25 @ 13:55) >  CONCLUSIONS:    1. Left ventricular cavity is small. Left ventricular wall thickness is normal. Left ventricular systolic function is normal with an ejection fraction of 73 % by Rios's method of disks.   2. Mildly enlarged right ventricular cavity size, with normal wall thickness, and normal right ventricular systolic function.   3. Moderate to severe aortic stenosis.   4. Moderate tricuspid regurgitation.   5. Estimated pulmonary artery systolic pressure is 75 mmHg, consistent with severe pulmonary hypertension.   6. No pericardial effusion seen.   7. Compared to the transthoracic echocardiogram performed on 4/12/2023, there have been no significant interval changes.  < end of copied text >    < from: CT Angio Chest PE Protocol w/ IV Cont (01.29.25 @ 08:47) >  IMPRESSION:  No pulmonary embolism.  Right upper lobe groundglass opacities and right lower lobe nodular   opacities are likely infectious/inflammatory.  Small bilateral pleural effusions.  < end of copied text >    < from: [facility] Duplex Lower Ext Vein Scan, Bilat (01.27.25 @ 20:06) >  IMPRESSION:  No evidence of deep venous thrombosis in either lower extremity.  < end of copied text >    < from: Xray Knee 3 Views, Right (01.27.25 @ 23:18) >  IMPRESSION:  1.  Right longstem femoral revision hardware with lucency surrounding the   hardware and marked subsidence in keeping with loosening.  2.  Lucency at the lateral cortex of the proximal femur may reflect   postoperative change versus osteolysis.  3.  Pseudoarticulation between the proximal femur and right iliac bone  < end of copied text >    < from: [facility] Duplex Lower Ext Vein Scan, Bilat (01.27.25 @ 20:06) >  IMPRESSION:  No evidence of deep venous thrombosis in either lower extremity.  < end of copied text >    < from: TTE W or WO Ultrasound Enhancing Agent (06.17.24 @ 17:12) >  CONCLUSIONS:    1. Left ventricular cavity is normal in size. Left ventricular wall thickness is normal. Left ventricular systolic function is normal with an ejection fraction of 64 % by Rios's method of disks. There are no regional wall motion abnormalities seen.   2. There is mild (grade 1) left ventricular diastolic dysfunction.   3. Normal right ventricular cavity size and normal systolic function.   4. Structurally normal mitral valve with normal leaflet excursion. There is calcification of the mitral valve annulus. There is mild mitral regurgitation.   5. The aortic valve anatomy cannot be determined with reduced systolic excursion. There is calcification of the aortic valve leaflets. There is severe aortic stenosis. The peak transaortic velocity is 3.94 m/s, peak transaortic gradient is 62.1 mmHg and mean transaortic gradient is 32.0 mmHg with an LVOT/aortic valve VTI ratio of 0.22. The aorticvalve area is estimated at 0.51 cm² by the continuity equation. There is mild to moderate aortic regurgitation.   6. The left atrium is mildly dilated with an indexed volume of 41 ml/m².   7. No prior echocardiogram is available for comparison.  < end of copied text >    ___________________________________________________________________________________  ===============>>  A S S E S S M E N T   A N D   P L A N <<===============  ------------------------------------------------------------------------------------------    79F w/ hx of ETOH in past, PAD w/ b/l carotid artery stenosis, HFpEF EF 64% w/ severe AS, COPD, HTN, CAD, RLE DVT 8/2023 w/ hx of R hip infection p/w worsening R hip pain     Problem/Plan - 1:  ·  Problem: right hip pain.   ·  Plan: Patient endorsing severe R hip pain w/o inciting fall or traumatic event. 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 06/2024 admission with possible joint infection. No clear signs of infection currently and exam is equivocal. -Pain control with Dilaudid 0.5mg IV Q6hrs PRN severe pain overnight, low threshold for pain consult, as per prior admission there is some substance use history.     >> pain management consult appreciated   -Bowel regimen  -ESR CRP as above   -follow BCxs   - Ortho following      -on chronic suppressive antibiotics    -Trend wbc, fever curve can consider ID consult pending cultures..   - ortho follow up  - ID consulted, in process     ## additional osteomyelitis of ankle     podiatry following, appreciated    ID evaluation for antibiotics Rx >> will need long term antibiotics Rx     otherwise as above    local wound care     Problem/Plan - 2:  ·  Problem: acute exacerbation of Chronic heart failure with preserved ejection fraction (HFpEF), edema, elevated BNP     in patient with severe Aortic stenosis as above   repeat TTE as above with AS, MR, PAH  diuretics per cardio   -Daily weight and I/O  cardio following   Xarelto 2.5 mg BID per cardio    CTA and Duplex negative for VTE      Problem/Plan - 3:  ·  Problem: COPD  ·  Plan: Notable wheeze on exam on presentation   ? has 02 PRN at home. Patient smoking tobacco, not clarifying how much.  -Duonebs Q6hrs  -Cont. Symbicort BID  -Cont. Spiriva    ## pneumonia on CT as above >> started on antibiotics >> monitor   - pulm evaluation as needed : house    ID onboard     patient intermittently has been refusing nebs and diuretics     Problem/Plan - 4:  ·  Problem: CAD (coronary artery disease).   ·  Plan: Patient states she does not take aspirin or atorvastatin at home.  cardio following, appreciated     Problem/Plan - 5:  ·  Problem: EtOH dependence.   no signs of withdrawal      Problem/Plan - 6:  ·  Problem: Severe aortic stenosis.   ·  Plan: hx of severe AS, note LE edema  repeat echo as above : unchanged   cardio appreciated     Problem/Plan - 7:  ·  Problem: Essential hypertension.   ·  Plan: -Trend BP  -Cont. Clonidine BID  -Patient does not recall taking Nifedipine, can resume prior dose if BPs uncontrolled.     Problem/Plan - 8:  ·  Problem: Prophylactic measure.   ·  Plan: DVT PPx  xarelto     --------------------------------------------  Case discussed with patient, daughter (she is a PA), Nurse Practitioner   Education given on findings and plan of care    Today I had a prolonged conversation with the patient, DAQUAN TOMLINCATHERINE, re diagnosis, findings, and plan of care, options, alternatives, prognosis... . Patient verbalized clear understanding, all questions answered.  I separately spoke with patient's  daughter ( with her permission) in great detail given multiple complicated medical issues including sever AS, COPD, pneumonia, OM and wound and prosthetic infections pt's continued smoking, ETOH use and pain medication abuse ..     patient seen according to [facility] cost code ( [id number] /[id number] /)     Additional time spent  35 min.  ___________________________  H. GONZALO Perez.  Pager: [phone]       _________________________________________________________________________________________  ========>>  M E D I C A L   A T T E N D I N G    F O L L O W  U P  N O T E  <<=========  -----------------------------------------------------------------------------------------------------    - Patient seen and examined by me earlier today.   - Patient today overall doing fairly, still complains of pain.. patient educated on the medications she is already on including standing and PRN medications.. )    ==================>> REVIEW OF SYSTEM <<=================    GEN: no fever, no chills, pain in right hip area..   RESP: no SOB, no cough, no sputum  CVS: no chest pain, no palpitations  GI: no abdominal pain, no nausea  : no dysuria, no frequency  Neuro: no headache, no dizziness    ==================>> PHYSICAL EXAM <<=================    GEN: A&O X 3 , NAD , comfortable, as above ..   HEENT: NCAT, PERRL, MMM, hearing intact  CVS: S1S2 , regular , No M/R/G appreciated  PULM: scattered wheez >> decreased   ABD.: soft. non tender, non distended,  Extrem: intact pulses , leg edema decreased : stasis changes , wounds dressed        ( Note written / Date of service 01-31-25 ( This is certified to be the same as "ENTERED" date above ( for billing purposes)))    ==================>> MEDICATIONS <<====================    albuterol/ipratropium for Nebulization 3 milliLiter(s) Nebulizer every 6 hours  atorvastatin 40 milliGRAM(s) Oral at bedtime  cefepime   IVPB 1000 milliGRAM(s) IV Intermittent every 8 hours  chlorhexidine 2% Cloths 1 Application(s) Topical daily  cloNIDine 0.1 milliGRAM(s) Oral every 12 hours  collagenase Ointment 1 Application(s) Topical daily  fluticasone propionate/ salmeterol 250-50 MICROgram(s) Diskus 1 Dose(s) Inhalation two times a day  furosemide    Tablet 40 milliGRAM(s) Oral daily  gabapentin 400 milliGRAM(s) Oral two times a day  morphine ER Tablet 15 milliGRAM(s) Oral every 12 hours  polyethylene glycol 3350 17 Gram(s) Oral daily  rivaroxaban 2.5 milliGRAM(s) Oral two times a day  senna 2 Tablet(s) Oral at bedtime  thiamine 100 milliGRAM(s) Oral daily  tiotropium 2.5 MICROgram(s) Inhaler 2 Puff(s) Inhalation daily  vancomycin  IVPB        MEDICATIONS  (PRN):  acetaminophen     Tablet .. 650 milliGRAM(s) Oral every 6 hours PRN Temp greater or equal to 38C (100.4F), Mild Pain (1 - 3)  melatonin 3 milliGRAM(s) Oral at bedtime PRN Insomnia  naloxone Injectable 0.2 milliGRAM(s) IV Push every 3 minutes PRN respiratory depression/ suspected opioid OD  nicotine  Polacrilex Gum 4 milliGRAM(s) Oral four times a day PRN Smoking Cessation  ondansetron Injectable 4 milliGRAM(s) IV Push every 8 hours PRN Nausea and/or Vomiting  oxyCODONE    IR 5 milliGRAM(s) Oral every 4 hours PRN Severe Pain (7 - 10)    ___________  Active diet:  Diet, DASH/TLC:   Sodium & Cholesterol Restricted  ___________________    ==================>> VITAL SIGNS <<==================    Vital Signs Last 24 HrsT(C): 36.8 (01-31-25 @ 12:09)  T(F): 98.2 (01-31-25 @ 12:09), Max: 98.2 (01-31-25 @ 12:09)  HR: 79 (01-31-25 @ 12:09) (67 - 79)  BP: 124/64 (01-31-25 @ 12:09)  RR: 18 (01-31-25 @ 12:09) (18 - 18)  SpO2: 95% (01-31-25 @ 12:09) (95% - 97%)       ==================>> LAB AND IMAGING <<==================                        10.4   4.70  )-----------( 214      ( 31 Jan 2025 06:54 )             35.0        01-30    137  |  97  |  26[H]  ----------------------------<  79  4.1   |  30  |  0.84    Ca    8.3[L]      30 Jan 2025 07:13    TPro  6.0  /  Alb  2.5[L]  /  TBili  0.4  /  DBili  x   /  AST  14  /  ALT  8[L]  /  AlkPhos  80  01-30    WBC count:   4.70 <<== ,  4.18 <<== ,  5.81 <<== ,  6.16 <<==   Hemoglobin:   10.4 <<==,  9.2 <<==,  11.1 <<==,  11.1 <<==  platelets:  214 <==, 199 <==, 225 <==, 253 <==    Creatinine:  0.84  <<==, 1.08  <<==, 0.93  <<==  Sodium:   137  <==, 141  <==, 141  <==       AST:          14(01-30) <== , 15(01-27) <== , 24(01-27) <==      ALT:        8(01-30)  <== , 11(01-27)  <== , 15(01-27)  <==      AP:        80(01-30)  <=, 109(01-27)  <=, 121(01-27)  <=     Bili:        0.4(01-30)  <=, 0.3(01-27)  <=, 0.3(01-27)  <=    ____________________________    M I C R O B I O L O G Y :    Culture - Wound Aerobic/Anaerobic (collected 29 Jan 2025 07:19)  Source: Skin/Wound  Preliminary Report (31 Jan 2025 10:25):    Numerous Pseudomonas aeruginosa    Rare Methicillin Resistant Staphylococcus aureus  Organism: Pseudomonas aeruginosa  Methicillin resistant Staphylococcus aureus (31 Jan 2025 10:25)  Organism: Methicillin resistant Staphylococcus aureus (31 Jan 2025 10:25)    Sensitivities:      Method Type: DEVAN      -  Clindamycin: S <=0.25      -  Daptomycin: S 1      -  Erythromycin: R >4      -  Gentamicin: S <=4 Should not be used as monotherapy      -  Linezolid: S 2      -  Oxacillin: R >2      -  Penicillin: R >2      -  Rifampin: S <=1 Should not be used as monotherapy      -  Tetracycline: I 8      -  Trimethoprim/Sulfamethoxazole: S <=0.5/9.5      -  Vancomycin: S 1  Organism: Pseudomonas aeruginosa (31 Jan 2025 10:23)    Sensitivities:      Method Type: DEVAN      -  Amikacin: S <=16      -  Aztreonam: S <=4      -  Cefepime: S <=2      -  Ceftazidime: S 4      -  Ciprofloxacin: S <=0.25      -  Imipenem: S <=1      -  Levofloxacin: S <=0.5      -  Meropenem: S <=1      -  Piperacillin/Tazobactam: S <=8    Culture - Wound Aerobic/Anaerobic (collected 29 Jan 2025 07:19)  Source: Skin/Wound  Preliminary Report (31 Jan 2025 10:22):    Moderate Methicillin Resistant Staphylococcus aureus  Organism: Methicillin resistant Staphylococcus aureus (31 Jan 2025 10:22)  Organism: Methicillin resistant Staphylococcus aureus (31 Jan 2025 10:22)    Sensitivities:      Method Type: DEVAN      -  Clindamycin: S <=0.25      -  Daptomycin: S 1      -  Erythromycin: R >4      -  Gentamicin: S <=4 Should not be used as monotherapy      -  Linezolid: S 2      -  Oxacillin: R >2      -  Penicillin: R >2      -  Rifampin: S <=1 Should not be used as monotherapy      -  Tetracycline: I 8      -  Trimethoprim/Sulfamethoxazole: S <=0.5/9.5      -  Vancomycin: S 1    Culture - Blood (collected 27 Jan 2025 22:17)  Source: .Blood BLOOD  Preliminary Report (31 Jan 2025 04:01):    No growth at 72 Hours    Culture - Blood (collected 27 Jan 2025 22:10)  Source: .Blood BLOOD  Preliminary Report (31 Jan 2025 04:01):    No growth at 72 Hours      < from: TTE W or WO Ultrasound Enhancing Agent (01.28.25 @ 13:55) >  CONCLUSIONS:    1. Left ventricular cavity is small. Left ventricular wall thickness is normal. Left ventricular systolic function is normal with an ejection fraction of 73 % by Rios's method of disks.   2. Mildly enlarged right ventricular cavity size, with normal wall thickness, and normal right ventricular systolic function.   3. Moderate to severe aortic stenosis.   4. Moderate tricuspid regurgitation.   5. Estimated pulmonary artery systolic pressure is 75 mmHg, consistent with severe pulmonary hypertension.   6. No pericardial effusion seen.   7. Compared to the transthoracic echocardiogram performed on 4/12/2023, there have been no significant interval changes.  < end of copied text >    < from: CT Angio Chest PE Protocol w/ IV Cont (01.29.25 @ 08:47) >  IMPRESSION:  No pulmonary embolism.  Right upper lobe groundglass opacities and right lower lobe nodular   opacities are likely infectious/inflammatory.  Small bilateral pleural effusions.  < end of copied text >    < from: [facility] Duplex Lower Ext Vein Scan, Bilat (01.27.25 @ 20:06) >  IMPRESSION:  No evidence of deep venous thrombosis in either lower extremity.  < end of copied text >    < from: Xray Knee 3 Views, Right (01.27.25 @ 23:18) >  IMPRESSION:  1.  Right longstem femoral revision hardware with lucency surrounding the   hardware and marked subsidence in keeping with loosening.  2.  Lucency at the lateral cortex of the proximal femur may reflect   postoperative change versus osteolysis.  3.  Pseudoarticulation between the proximal femur and right iliac bone  < end of copied text >    < from: [facility] Duplex Lower Ext Vein Scan, Bilat (01.27.25 @ 20:06) >  IMPRESSION:  No evidence of deep venous thrombosis in either lower extremity.  < end of copied text >    < from: TTE W or WO Ultrasound Enhancing Agent (06.17.24 @ 17:12) >  CONCLUSIONS:    1. Left ventricular cavity is normal in size. Left ventricular wall thickness is normal. Left ventricular systolic function is normal with an ejection fraction of 64 % by Rios's method of disks. There are no regional wall motion abnormalities seen.   2. There is mild (grade 1) left ventricular diastolic dysfunction.   3. Normal right ventricular cavity size and normal systolic function.   4. Structurally normal mitral valve with normal leaflet excursion. There is calcification of the mitral valve annulus. There is mild mitral regurgitation.   5. The aortic valve anatomy cannot be determined with reduced systolic excursion. There is calcification of the aortic valve leaflets. There is severe aortic stenosis. The peak transaortic velocity is 3.94 m/s, peak transaortic gradient is 62.1 mmHg and mean transaortic gradient is 32.0 mmHg with an LVOT/aortic valve VTI ratio of 0.22. The aorticvalve area is estimated at 0.51 cm² by the continuity equation. There is mild to moderate aortic regurgitation.   6. The left atrium is mildly dilated with an indexed volume of 41 ml/m².   7. No prior echocardiogram is available for comparison.  < end of copied text >    ___________________________________________________________________________________  ===============>>  A S S E S S M E N T   A N D   P L A N <<===============  ------------------------------------------------------------------------------------------    79F w/ hx of ETOH in past, PAD w/ b/l carotid artery stenosis, HFpEF EF 64% w/ severe AS, COPD, HTN, CAD, RLE DVT 8/2023 w/ hx of R hip infection p/w worsening R hip pain     Problem/Plan - 1:  ·  Problem: right hip pain.   ·  Plan: Patient endorsing severe R hip pain w/o inciting fall or traumatic event. 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 06/2024 admission with possible joint infection. No clear signs of infection currently and exam is equivocal. -Pain control with Dilaudid 0.5mg IV Q6hrs PRN severe pain overnight, low threshold for pain consult, as per prior admission there is some substance use history.     >> pain management consult appreciated   -Bowel regimen  -ESR CRP as above   -follow BCxs   - Ortho following      -on chronic suppressive antibiotics    -Trend wbc, fever curve can consider ID consult pending cultures..   - ortho follow up  - ID consulted, in process     ## additional osteomyelitis of ankle     podiatry following, appreciated    ID evaluation for antibiotics Rx >> will need long term antibiotics Rx     otherwise as above    local wound care     Problem/Plan - 2:  ·  Problem: acute exacerbation of Chronic heart failure with preserved ejection fraction (HFpEF), edema, elevated BNP     in patient with severe Aortic stenosis as above   repeat TTE as above with AS, MR, PAH  diuretics per cardio   -Daily weight and I/O  cardio following   Xarelto 2.5 mg BID per cardio    CTA and Duplex negative for VTE      Problem/Plan - 3:  ·  Problem: COPD  ·  Plan: Notable wheeze on exam on presentation   ? has 02 PRN at home. Patient smoking tobacco, not clarifying how much.  -Duonebs Q6hrs  -Cont. Symbicort BID  -Cont. Spiriva    ## pneumonia on CT as above >> started on antibiotics >> monitor   - pulm evaluation as needed : house    ID onboard     patient intermittently has been refusing nebs and diuretics     Problem/Plan - 4:  ·  Problem: CAD (coronary artery disease).   ·  Plan: Patient states she does not take aspirin or atorvastatin at home.  cardio following, appreciated     Problem/Plan - 5:  ·  Problem: EtOH dependence.   no signs of withdrawal      Problem/Plan - 6:  ·  Problem: Severe aortic stenosis.   ·  Plan: hx of severe AS, note LE edema  repeat echo as above : unchanged   cardio appreciated     Problem/Plan - 7:  ·  Problem: Essential hypertension.   ·  Plan: -Trend BP  -Cont. Clonidine BID  -Patient does not recall taking Nifedipine, can resume prior dose if BPs uncontrolled.     Problem/Plan - 8:  ·  Problem: Prophylactic measure.   ·  Plan: DVT PPx  xarelto     --------------------------------------------  Case discussed with patient, daughter (she is a PA), Nurse Practitioner   Education given on findings and plan of care    Today I had a prolonged conversation with the patient, DAQUAN TOMLINCATHERINE, re diagnosis, findings, and plan of care, options, alternatives, prognosis... . Patient verbalized clear understanding, all questions answered.  I separately spoke with patient's  daughter ( with her permission) in great detail given multiple complicated medical issues including sever AS, COPD, pneumonia, OM and wound and prosthetic infections pt's continued smoking, ETOH use and pain medication abuse ..   patient would be at high risk for any orthopedic surgery..     patient seen according to [facility] cost code ( 03107 /33905 /)     Additional time spent  35 min.  ___________________________  H. GONZALO Perez.  Pager: [phone]

## 2025-01-31 NOTE — CONSULT NOTE ADULT - SUBJECTIVE AND OBJECTIVE BOX
Patient is a 79y old  Female who presents with a chief complaint of Worsening R hip pain (31 Jan 2025 07:17)    HPI:  79F w/ hx of ETOH in past, PAD w/ b/l carotid artery stenosis, HFpEF EF 64% w/ severe AS, COPD, HTN, CAD, RLE DVT 8/2023 w/ hx of R hip infection p/w worsening R hip pain. Patient is poor historian, not answering all questions she is being asked, continued requests for pain medication. Patient endorses being admitted to [facility] for multiple days for hip pain. States she did not receive antibiotics. Cannot specify nature of her admission. States she fell in front of [employer] before [place] admission but not since. States she was able to bear weight after leaving hospital around 3 days ago but pain has progressively worsened and does not think she can ambulate now. Denies fevers, chills or additional trauma. States she does not remember her medications and is not compliant with many of them outside of hypertension.     In ER: Given Tylenol 1gm IVPB, Duoneb x2, Morphine 4mg IV x1 (27 Jan 2025 21:36)       REVIEW OF SYSTEMS  [  ] ROS unobtainable because:    [  ] All other systems negative except as noted below    Constitutional:  [ ] fever [ ] chills  [ ] weight loss  [ ]night sweat  [ ]poor appetite/PO intake [ ]fatigue   Skin:  [ ] rash [ ] phlebitis	  Eyes: [ ] icterus [ ] pain  [ ] discharge	  ENMT: [ ] sore throat  [ ] thrush [ ] ulcers [ ] exudates [ ]anosmia  Respiratory: [ ] dyspnea [ ] hemoptysis [ ] cough [ ] sputum	  Cardiovascular:  [ ] chest pain [ ] palpitations [ ] edema	  Gastrointestinal:  [ ] nausea [ ] vomiting [ ] diarrhea [ ] constipation [ ] pain	  Genitourinary:  [ ] dysuria [ ] frequency [ ] hematuria [ ] discharge [ ] flank pain  [ ] incontinence  Musculoskeletal:  [ ] myalgias [ ] arthralgias [ ] arthritis  [ ] back pain  Neurological:  [ ] headache [ ] weakness [ ] seizures  [ ] confusion/altered mental status    prior hospital charts reviewed [V]  primary team notes reviewed [V]  other consultant notes reviewed [V]    PAST MEDICAL & SURGICAL HISTORY:  Hypertension      Hyperlipidemia      CAD (coronary artery disease)      Migraine      Anxiety      Emphysema, unspecified      HTN (hypertension)      Anxiety      Coronary artery disease      Stented coronary artery      Seizure      Alcohol abuse      Migraine      Pain of right hip joint      MRSA bacteremia      Essential hypertension      CAD (coronary artery disease)      Elevated brain natriuretic peptide (BNP) level      S/P bladder repair      Status post total hip replacement, right  5/21/18      History of arthroplasty of right hip          SOCIAL HISTORY:  - Denied smoking/vaping/alcohol/recreational drug use    FAMILY HISTORY:  Family history of coronary artery disease in daughter (Child)    Family history of essential hypertension        Allergies  No Known Allergies        ANTIMICROBIALS:  cefepime   IVPB 500 every 12 hours  doxycycline monohydrate Capsule 100 every 12 hours      ANTIMICROBIALS (past 90 days):  MEDICATIONS  (STANDING):  cefepime   IVPB   100 mL/Hr IV Intermittent (01-31-25 @ 06:06)   100 mL/Hr IV Intermittent (01-30-25 @ 17:02)   100 mL/Hr IV Intermittent (01-30-25 @ 05:05)   100 mL/Hr IV Intermittent (01-29-25 @ 17:21)    doxycycline monohydrate Capsule   100 milliGRAM(s) Oral (01-31-25 @ 05:52)   100 milliGRAM(s) Oral (01-30-25 @ 17:21)   100 milliGRAM(s) Oral (01-30-25 @ 05:05)   100 milliGRAM(s) Oral (01-29-25 @ 17:20)   100 milliGRAM(s) Oral (01-29-25 @ 06:08)   100 milliGRAM(s) Oral (01-28-25 @ 17:37)        OTHER MEDS:   MEDICATIONS  (STANDING):  acetaminophen     Tablet .. 650 every 6 hours PRN  acetaminophen   IVPB .. 1000 once  albuterol/ipratropium for Nebulization 3 every 6 hours  atorvastatin 40 at bedtime  cloNIDine 0.1 every 12 hours  fluticasone propionate/ salmeterol 250-50 MICROgram(s) Diskus 1 two times a day  furosemide    Tablet 40 daily  gabapentin 400 two times a day  melatonin 3 at bedtime PRN  morphine ER Tablet 15 every 12 hours  ondansetron Injectable 4 every 8 hours PRN  oxyCODONE    IR 5 every 4 hours PRN  polyethylene glycol 3350 17 daily  rivaroxaban 2.5 two times a day  senna 2 at bedtime  tiotropium 2.5 MICROgram(s) Inhaler 2 daily      VITALS:  Vital Signs Last 24 Hrs  T(F): 97.9 (01-31-25 @ 05:01), Max: 98.8 (01-28-25 @ 20:10)    Vital Signs Last 24 Hrs  HR: 67 (01-31-25 @ 05:01) (67 - 74)  BP: 173/76 (01-31-25 @ 05:01) (127/70 - 173/76)  RR: 18 (01-31-25 @ 05:01)  SpO2: 97% (01-31-25 @ 05:01) (96% - 97%)  Wt(kg): --    EXAM:  Physical Exam:  Constitutional:  well preserved, comfortable  Head/Eyes: no icterus, PERRL, EOMI  ENT:  supple; no thrush  LUNGS:  CTA  CVS:  normal S1, S2, no murmur  Abd:  soft, non-tender; non-distended  Ext:  no edema  Vascular:  IV site no erythema tenderness or discharge  MSK:  joints without swelling  Neuro: AAO X 3, non- focal    Labs:                        10.4   4.70  )-----------( 214      ( 31 Jan 2025 06:54 )             35.0     01-30    137  |  97  |  26[H]  ----------------------------<  79  4.1   |  30  |  0.84    Ca    8.3[L]      30 Jan 2025 07:13    TPro  6.0  /  Alb  2.5[L]  /  TBili  0.4  /  DBili  x   /  AST  14  /  ALT  8[L]  /  AlkPhos  80  01-30      WBC Trend:  WBC Count: 4.70 (01-31-25 @ 06:54)  WBC Count: 4.18 (01-30-25 @ 07:11)  WBC Count: 5.81 (01-27-25 @ 22:26)  WBC Count: 6.16 (01-27-25 @ 16:35)      Auto Neutrophil #: 3.11 K/uL (01-30-25 @ 07:11)  Auto Neutrophil #: 4.32 K/uL (01-27-25 @ 22:26)  Auto Neutrophil #: 5.11 K/uL (01-27-25 @ 16:35)  Auto Neutrophil #: 3.77 K/uL (06-26-24 @ 06:30)  Auto Neutrophil #: 4.94 K/uL (06-24-24 @ 07:15)      Creatine Trend:  Creatinine: 0.84 (01-30)  Creatinine: 1.08 (01-27)  Creatinine: 0.93 (01-27)      Liver Biochemical Testing Trend:  Alanine Aminotransferase (ALT/SGPT): 8 *L* (01-30)  Alanine Aminotransferase (ALT/SGPT): 11 (01-27)  Alanine Aminotransferase (ALT/SGPT): 15 (01-27)  Alanine Aminotransferase (ALT/SGPT): 7 (06-20)  Alanine Aminotransferase (ALT/SGPT): 10 (06-19)  Aspartate Aminotransferase (AST/SGOT): 14 (01-30-25 @ 07:13)  Aspartate Aminotransferase (AST/SGOT): 15 (01-27-25 @ 22:26)  Aspartate Aminotransferase (AST/SGOT): 24 (01-27-25 @ 16:35)  Aspartate Aminotransferase (AST/SGOT): 15 (06-20-24 @ 06:10)  Aspartate Aminotransferase (AST/SGOT): 12 (06-19-24 @ 07:09)  Bilirubin Total: 0.4 (01-30)  Bilirubin Total: 0.3 (01-27)  Bilirubin Total: 0.3 (01-27)  Bilirubin Total: 0.3 (06-20)  Bilirubin Total: 0.2 (06-19)      Trend LDH      Auto Eosinophil %: 1.2 % (01-30-25 @ 07:11)      Urinalysis Basic - ( 30 Jan 2025 07:13 )    Color: x / Appearance: x / SG: x / pH: x  Gluc: 79 mg/dL / Ketone: x  / Bili: x / Urobili: x   Blood: x / Protein: x / Nitrite: x   Leuk Esterase: x / RBC: x / WBC x   Sq Epi: x / Non Sq Epi: x / Bacteria: x        MICROBIOLOGY:    MRSA PCR Result.: Jewellc (06-12-24 @ 14:54)      Culture - Blood (collected 27 Jan 2025 22:17)  Source: .Blood BLOOD  Preliminary Report:    No growth at 72 Hours    Culture - Blood (collected 27 Jan 2025 22:10)  Source: .Blood BLOOD  Preliminary Report:    No growth at 72 Hours    Culture - Fungal, Body Fluid (collected 11 Jun 2024 19:13)  Source: .Body Fluid RIGHT HIP ASPIRATION  Final Report:    No fungus isolated at 4 weeks.    Culture - Blood (collected 11 Jun 2024 06:44)  Source: .Blood Blood-Peripheral  Final Report:    No growth at 5 days    Culture - Blood (collected 11 Jun 2024 06:35)  Source: .Blood Blood-Peripheral  Final Report:    No growth at 5 days    Culture - Urine (collected 11 Jun 2024 01:33)  Source: Clean Catch Clean Catch (Midstream)  Final Report:    >=3 organisms. Probable collection contamination.    Culture - Urine (collected 17 May 2023 00:30)  Source: Clean Catch Clean Catch (Midstream)  Final Report:    <10,000 CFU/mL Normal Urogenital Mone    Culture - Blood (collected 16 May 2023 23:30)  Source: .Blood Blood-Venous  Final Report:    No Growth Final    Culture - Blood (collected 16 May 2023 23:17)  Source: .Blood Blood-Peripheral  Final Report:    No Growth Final    Culture - Blood (collected 12 Apr 2023 11:02)  Source: .Blood Blood-Venous  Final Report:    No Growth Final  Legionella Antigen, Urine: Negative (01-29 @ 12:56)  C-Reactive Protein: 53 (01-27)    D-Dimer Assay, Quantitative: 1173 (01-29)      RADIOLOGY:  imaging below personally reviewed   HPI:  79F w/ hx of ETOH in past, PAD w/ b/l carotid artery stenosis, HFpEF EF 64% w/ severe AS, COPD, HTN, CAD, RLE DVT 8/2023 w/ hx of R hip infection p/w worsening R hip pain. Patient is poor historian, not answering all questions she is being asked, continued requests for pain medication. Patient endorses being admitted to [facility] for multiple days for hip pain. States she did not receive antibiotics. Cannot specify nature of her admission. States she fell in front of [employer] before [place] admission but not since. States she was able to bear weight after leaving hospital around 3 days ago but pain has progressively worsened and does not think she can ambulate now. Denies fevers, chills or additional trauma. States she does not remember her medications and is not compliant with many of them outside of hypertension.     ID consulted for concern of R ankle osteitis.     REVIEW OF SYSTEMS  [  ] ROS unobtainable because:    [ X] All other systems negative except as noted below    Constitutional:  [ ] fever [ ] chills  [ ] weight loss  [ ]night sweat  [ ]poor appetite/PO intake [ ]fatigue   Skin:  [ ] rash [ ] phlebitis	  Eyes: [ ] icterus [ ] pain  [ ] discharge	  ENMT: [ ] sore throat  [ ] thrush [ ] ulcers [ ] exudates [ ]anosmia  Respiratory: [ ] dyspnea [ ] hemoptysis [ ] cough [ ] sputum	  Cardiovascular:  [ ] chest pain [ ] palpitations [ ] edema	  Gastrointestinal:  [ ] nausea [ ] vomiting [ ] diarrhea [ ] constipation [ ] pain	  Genitourinary:  [ ] dysuria [ ] frequency [ ] hematuria [ ] discharge [ ] flank pain  [ ] incontinence  Musculoskeletal:  [ ] myalgias [ ] arthralgias [ ] arthritis  [ ] back pain [x] R hip pain   Neurological:  [ ] headache [ ] weakness [ ] seizures  [ ] confusion/altered mental status    prior hospital charts reviewed [V]  primary team notes reviewed [V]  other consultant notes reviewed [V]    PAST MEDICAL & SURGICAL HISTORY:  Hypertension      Hyperlipidemia      CAD (coronary artery disease)      Migraine      Anxiety      Emphysema, unspecified      HTN (hypertension)      Anxiety      Coronary artery disease      Stented coronary artery      Seizure      Alcohol abuse      Migraine      Pain of right hip joint      MRSA bacteremia      Essential hypertension      CAD (coronary artery disease)      Elevated brain natriuretic peptide (BNP) level      S/P bladder repair      Status post total hip replacement, right  5/21/18      History of arthroplasty of right hip          SOCIAL HISTORY:  - Denied smoking/vaping/alcohol/recreational drug use    FAMILY HISTORY:  Family history of coronary artery disease in daughter (Child)    Family history of essential hypertension        Allergies  No Known Allergies        ANTIMICROBIALS:  cefepime   IVPB 500 every 12 hours  doxycycline monohydrate Capsule 100 every 12 hours      ANTIMICROBIALS (past 90 days):  MEDICATIONS  (STANDING):  cefepime   IVPB   100 mL/Hr IV Intermittent (01-31-25 @ 06:06)   100 mL/Hr IV Intermittent (01-30-25 @ 17:02)   100 mL/Hr IV Intermittent (01-30-25 @ 05:05)   100 mL/Hr IV Intermittent (01-29-25 @ 17:21)    doxycycline monohydrate Capsule   100 milliGRAM(s) Oral (01-31-25 @ 05:52)   100 milliGRAM(s) Oral (01-30-25 @ 17:21)   100 milliGRAM(s) Oral (01-30-25 @ 05:05)   100 milliGRAM(s) Oral (01-29-25 @ 17:20)   100 milliGRAM(s) Oral (01-29-25 @ 06:08)   100 milliGRAM(s) Oral (01-28-25 @ 17:37)        OTHER MEDS:   MEDICATIONS  (STANDING):  acetaminophen     Tablet .. 650 every 6 hours PRN  acetaminophen   IVPB .. 1000 once  albuterol/ipratropium for Nebulization 3 every 6 hours  atorvastatin 40 at bedtime  cloNIDine 0.1 every 12 hours  fluticasone propionate/ salmeterol 250-50 MICROgram(s) Diskus 1 two times a day  furosemide    Tablet 40 daily  gabapentin 400 two times a day  melatonin 3 at bedtime PRN  morphine ER Tablet 15 every 12 hours  ondansetron Injectable 4 every 8 hours PRN  oxyCODONE    IR 5 every 4 hours PRN  polyethylene glycol 3350 17 daily  rivaroxaban 2.5 two times a day  senna 2 at bedtime  tiotropium 2.5 MICROgram(s) Inhaler 2 daily      VITALS:  Vital Signs Last 24 Hrs  T(F): 97.9 (01-31-25 @ 05:01), Max: 98.8 (01-28-25 @ 20:10)    Vital Signs Last 24 Hrs  HR: 67 (01-31-25 @ 05:01) (67 - 74)  BP: 173/76 (01-31-25 @ 05:01) (127/70 - 173/76)  RR: 18 (01-31-25 @ 05:01)  SpO2: 97% (01-31-25 @ 05:01) (96% - 97%)  Wt(kg): --    EXAM:  Physical Exam:  Constitutional:  well preserved, comfortable  Head/Eyes: no icterus, PERRL, EOMI  ENT:  supple; no thrush  LUNGS:  CTA  CVS:  normal S1, S2, no murmur  Abd:  soft, non-tender; non-distended  Ext:  no edema  Vascular:  IV site no erythema tenderness or discharge  MSK:  joints without swelling  Neuro: AAO X 3, non- focal    Labs:                        10.4   4.70  )-----------( 214      ( 31 Jan 2025 06:54 )             35.0     01-30    137  |  97  |  26[H]  ----------------------------<  79  4.1   |  30  |  0.84    Ca    8.3[L]      30 Jan 2025 07:13    TPro  6.0  /  Alb  2.5[L]  /  TBili  0.4  /  DBili  x   /  AST  14  /  ALT  8[L]  /  AlkPhos  80  01-30      WBC Trend:  WBC Count: 4.70 (01-31-25 @ 06:54)  WBC Count: 4.18 (01-30-25 @ 07:11)  WBC Count: 5.81 (01-27-25 @ 22:26)  WBC Count: 6.16 (01-27-25 @ 16:35)      Auto Neutrophil #: 3.11 K/uL (01-30-25 @ 07:11)  Auto Neutrophil #: 4.32 K/uL (01-27-25 @ 22:26)  Auto Neutrophil #: 5.11 K/uL (01-27-25 @ 16:35)  Auto Neutrophil #: 3.77 K/uL (06-26-24 @ 06:30)  Auto Neutrophil #: 4.94 K/uL (06-24-24 @ 07:15)      Creatine Trend:  Creatinine: 0.84 (01-30)  Creatinine: 1.08 (01-27)  Creatinine: 0.93 (01-27)      Liver Biochemical Testing Trend:  Alanine Aminotransferase (ALT/SGPT): 8 *L* (01-30)  Alanine Aminotransferase (ALT/SGPT): 11 (01-27)  Alanine Aminotransferase (ALT/SGPT): 15 (01-27)  Alanine Aminotransferase (ALT/SGPT): 7 (06-20)  Alanine Aminotransferase (ALT/SGPT): 10 (06-19)  Aspartate Aminotransferase (AST/SGOT): 14 (01-30-25 @ 07:13)  Aspartate Aminotransferase (AST/SGOT): 15 (01-27-25 @ 22:26)  Aspartate Aminotransferase (AST/SGOT): 24 (01-27-25 @ 16:35)  Aspartate Aminotransferase (AST/SGOT): 15 (06-20-24 @ 06:10)  Aspartate Aminotransferase (AST/SGOT): 12 (06-19-24 @ 07:09)  Bilirubin Total: 0.4 (01-30)  Bilirubin Total: 0.3 (01-27)  Bilirubin Total: 0.3 (01-27)  Bilirubin Total: 0.3 (06-20)  Bilirubin Total: 0.2 (06-19)      Trend LDH      Auto Eosinophil %: 1.2 % (01-30-25 @ 07:11)      Urinalysis Basic - ( 30 Jan 2025 07:13 )    Color: x / Appearance: x / SG: x / pH: x  Gluc: 79 mg/dL / Ketone: x  / Bili: x / Urobili: x   Blood: x / Protein: x / Nitrite: x   Leuk Esterase: x / RBC: x / WBC x   Sq Epi: x / Non Sq Epi: x / Bacteria: x        MICROBIOLOGY:    MRSA PCR Result.: NotDetec (06-12-24 @ 14:54)      Culture - Blood (collected 27 Jan 2025 22:17)  Source: .Blood BLOOD  Preliminary Report:    No growth at 72 Hours    Culture - Blood (collected 27 Jan 2025 22:10)  Source: .Blood BLOOD  Preliminary Report:    No growth at 72 Hours    Culture - Fungal, Body Fluid (collected 11 Jun 2024 19:13)  Source: .Body Fluid RIGHT HIP ASPIRATION  Final Report:    No fungus isolated at 4 weeks.    Culture - Blood (collected 11 Jun 2024 06:44)  Source: .Blood Blood-Peripheral  Final Report:    No growth at 5 days    Culture - Blood (collected 11 Jun 2024 06:35)  Source: .Blood Blood-Peripheral  Final Report:    No growth at 5 days    Culture - Urine (collected 11 Jun 2024 01:33)  Source: Clean Catch Clean Catch (Midstream)  Final Report:    >=3 organisms. Probable collection contamination.    Culture - Urine (collected 17 May 2023 00:30)  Source: Clean Catch Clean Catch (Midstream)  Final Report:    <10,000 CFU/mL Normal Urogenital Mone    Culture - Blood (collected 16 May 2023 23:30)  Source: .Blood Blood-Venous  Final Report:    No Growth Final    Culture - Blood (collected 16 May 2023 23:17)  Source: .Blood Blood-Peripheral  Final Report:    No Growth Final    Culture - Blood (collected 12 Apr 2023 11:02)  Source: .Blood Blood-Venous  Final Report:    No Growth Final  Legionella Antigen, Urine: Negative (01-29 @ 12:56)  C-Reactive Protein: 53 (01-27)    D-Dimer Assay, Quantitative: 1173 (01-29)      RADIOLOGY:  imaging below personally reviewed      < from: MR Ankle w/wo IV Cont, Right (01.29.25 @ 18:54) >  IMPRESSION:  Lateral soft tissue ankle wound with osteitis of the adjacent fibula.   This area is susceptible to developing osteomyelitis.  No fluid collection.        --- End of Report ---    < end of copied text >    < from: CT Angio Chest PE Protocol w/ IV Cont (01.29.25 @ 08:47) >  IMPRESSION:    No pulmonary embolism.    Right upper lobe groundglass opacities and right lower lobe nodular   opacities are likely infectious/inflammatory.    Small bilateral pleural effusions.    < end of copied text >

## 2025-01-31 NOTE — PROGRESS NOTE ADULT - ASSESSMENT
77F w/ R lateral malleolus wound to bone  - Pt seen and evaluated  - Afebrile, No Leukocytosis   - R lateral malleolus wound to level of bone, fibrogranular wound bed, indurated borders, well circumscribed, no malodor or purulence. L heel wound to the level of subq, no acute acute signs of infection.   - R ankle MR: early OM of lateral mal  - ID recs appreciated   - Ortho Recs appreciated   - STRICT decubitus precautions, Z- FLOWS boots at all time when in bed and in chair resting.   - No acute podiatric surgical intervention, OM extends above podiatric surgical scope  - Wound care instructions and followup information in discharge paperwork.   - Seen with  attending.

## 2025-01-31 NOTE — CHART NOTE - NSCHARTNOTEFT_GEN_A_CORE
79F w/ hx of ETOH in past, PAD w/ b/l carotid artery stenosis, HFpEF EF 64% w/ severe AS, COPD, HTN, CAD, RLE DVT 8/2023 w/ hx of R hip infection p/w worsening R hip pain. Patient is poor historian, not answering all questions she is being asked, continued requests for pain medication. Patient endorses being admitted to [facility] for multiple days for hip pain. States she did not receive antibiotics. Cannot specify nature of her admission. States she fell in front of [facility] before [place] admission but not since. States she was able to bear weight after leaving hospital around 3 days ago but pain has progressively worsened and does not think she can ambulate now. Denies fevers, chills or additional trauma. States she does not remember her medications and is not compliant with many of them outside of hypertension.     Current out- patient pain regimen: none- a few small scripts for oxy 5 mg and oxy 10 mg  Out Patient Pain Management provider: none  [facility] Prescription Monitoring Program: Reference #[id number]  Opioid Risk Tool (ORT-OUD) Score: high    Current Pain Score: 9/10 to 0/10    Pt with acute on chronic R hip and LE pain.    Continue:  Morphine ER 15 mg Q 12 hours.  Oxy IR 5 mg Q 4 hours PRN.  Neurontin 400 mg Q 8 hours- current CrCl is 56 and max daily dose is 1400 mg.  Narcan 0.2 mg IV Q 3 minutes PRN x 3 doses for respiratory depression/ suspected opioid OD.    Monitor for sedation and respiratory depression.  Bowel regimen.  Incentive spirometer.  OOB/ PT per primary team.    Discharge with a narcan rescue kit (naloxone 4 mg/ 0.1 ml nasal spray- 1 spray Q 2-3 minutes alternating between nostrils).  If continued pain management is needed after discharge, can see Dr [place] [phone].    Signing off.      Chronic Pain Service  [phone]

## 2025-01-31 NOTE — CONSULT NOTE ADULT - ASSESSMENT
79F w/ hx of ETOH in past, PAD w/ b/l carotid artery stenosis, HFpEF EF 64% w/ severe AS, COPD, HTN, CAD, RLE DVT 8/2023 w/ hx of R hip infection p/w worsening R hip pain. Patient is poor historian, not answering all questions she is being asked, continued requests for pain medication. Patient endorses being admitted to [facility] for multiple days for hip pain. States she did not receive antibiotics. Cannot specify nature of her admission. States she fell in front of [employer] before [place] admission but not since. States she was able to bear weight after leaving hospital around 3 days ago but pain has progressively worsened and does not think she can ambulate now. Denies fevers, chills or additional trauma. States she does not remember her medications and is not compliant with many of them outside of hypertension.     Workup:   -MRI R ankle: Lateral soft tissue ankle wound with osteitis of the adjacent fibula. This area is susceptible to developing osteomyelitis. No fluid collection.  -Xray R hip: Right longstem femoral revision hardware with lucency surrounding the hardware and marked subsidence in keeping with loosening.Lucency at the lateral cortex of the proximal femur may reflect postoperative change versus osteolysis.Pseudoarticulation between the proximal femur and right iliac bone  -CT angio chest: No pulmonary embolism. Right upper lobe groundglass opacities and right lower lobe nodular opacities are likely infectious/inflammatory. Small bilateral pleural effusions.    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

## 2025-02-01 RX ADMIN — GABAPENTIN 400 MILLIGRAM(S): 400 CAPSULE ORAL at 06:27

## 2025-02-01 RX ADMIN — ATORVASTATIN CALCIUM 40 MILLIGRAM(S): 80 TABLET, FILM COATED ORAL at 22:01

## 2025-02-01 RX ADMIN — RIVAROXABAN 2.5 MILLIGRAM(S): 10 TABLET, FILM COATED ORAL at 17:38

## 2025-02-01 RX ADMIN — OXYCODONE HYDROCHLORIDE 5 MILLIGRAM(S): 30 TABLET ORAL at 14:58

## 2025-02-01 RX ADMIN — Medication 166.67 MILLIGRAM(S): at 15:11

## 2025-02-01 RX ADMIN — OXYCODONE HYDROCHLORIDE 5 MILLIGRAM(S): 30 TABLET ORAL at 11:30

## 2025-02-01 RX ADMIN — IPRATROPIUM BROMIDE AND ALBUTEROL SULFATE 3 MILLILITER(S): .5; 2.5 SOLUTION RESPIRATORY (INHALATION) at 10:37

## 2025-02-01 RX ADMIN — Medication 2 TABLET(S): at 22:01

## 2025-02-01 RX ADMIN — Medication 15 MILLIGRAM(S): at 07:35

## 2025-02-01 RX ADMIN — RIVAROXABAN 2.5 MILLIGRAM(S): 10 TABLET, FILM COATED ORAL at 06:27

## 2025-02-01 RX ADMIN — CEFEPIME 100 MILLIGRAM(S): 2 INJECTION, POWDER, FOR SOLUTION INTRAVENOUS at 06:26

## 2025-02-01 RX ADMIN — Medication 1 APPLICATION(S): at 10:39

## 2025-02-01 RX ADMIN — OXYCODONE HYDROCHLORIDE 5 MILLIGRAM(S): 30 TABLET ORAL at 15:30

## 2025-02-01 RX ADMIN — CEFEPIME 100 MILLIGRAM(S): 2 INJECTION, POWDER, FOR SOLUTION INTRAVENOUS at 18:20

## 2025-02-01 RX ADMIN — OXYCODONE HYDROCHLORIDE 5 MILLIGRAM(S): 30 TABLET ORAL at 10:38

## 2025-02-01 RX ADMIN — FUROSEMIDE 40 MILLIGRAM(S): 10 INJECTION INTRAMUSCULAR; INTRAVENOUS at 06:27

## 2025-02-01 RX ADMIN — Medication 15 MILLIGRAM(S): at 06:27

## 2025-02-01 RX ADMIN — Medication 0.1 MILLIGRAM(S): at 06:27

## 2025-02-01 RX ADMIN — Medication 15 MILLIGRAM(S): at 17:42

## 2025-02-01 RX ADMIN — GABAPENTIN 400 MILLIGRAM(S): 400 CAPSULE ORAL at 17:39

## 2025-02-01 RX ADMIN — Medication 1 DOSE(S): at 17:47

## 2025-02-01 RX ADMIN — IPRATROPIUM BROMIDE AND ALBUTEROL SULFATE 3 MILLILITER(S): .5; 2.5 SOLUTION RESPIRATORY (INHALATION) at 17:37

## 2025-02-01 RX ADMIN — Medication 3 MILLIGRAM(S): at 22:01

## 2025-02-01 RX ADMIN — Medication 15 MILLIGRAM(S): at 18:15

## 2025-02-01 RX ADMIN — Medication 0.1 MILLIGRAM(S): at 17:38

## 2025-02-01 RX ADMIN — IPRATROPIUM BROMIDE AND ALBUTEROL SULFATE 3 MILLILITER(S): .5; 2.5 SOLUTION RESPIRATORY (INHALATION) at 06:27

## 2025-02-01 RX ADMIN — Medication 1 DOSE(S): at 06:52

## 2025-02-01 RX ADMIN — TIOTROPIUM BROMIDE INHALATION SPRAY 2 PUFF(S): 3.12 SPRAY, METERED RESPIRATORY (INHALATION) at 10:38

## 2025-02-01 RX ADMIN — Medication 100 MILLIGRAM(S): at 10:38

## 2025-02-01 NOTE — PROGRESS NOTE ADULT - ASSESSMENT
79F        w/ hx of ETOH in past, PAD w/ b/l carotid artery stenosis,          HFpEF EF 64% w/ severe AS, COPD, HTN, CAD, RLE DVT 8/2023          w/ hx of R hip infection p/w worsening R hip pain /  s/p  R hip  surg in past       CAD/  stent ,  cw antiplatelet, statin and antianginals  . denies chest pain..  card follwoing        lasix 40mg po qd.   s/p echo,    mod to severe AS. doubt would be a TAVR candidate in setting of chronic hip infection and risk of endocarditis     pulmonary consult to assist   tx of copd  cw xarelto 2.5mg bid for PAD   and "ppx for hx of VTE"   pain control  abx per ID, on iv cefepime. iv vanco., /  mri,  osteo,  foot     s/p or/  [facility] path    covering  dr rosenberg          79F        w/ hx of ETOH in past, PAD w/ b/l carotid artery stenosis,          HFpEF EF 64% w/ severe AS, COPD, HTN, CAD, RLE DVT 8/2023          w/ hx of R hip infection p/w worsening R hip pain /  s/p  R hip  surg in past       CAD/  stent ,  cw antiplatelet, statin and antianginals  . denies chest pain..  card follwoing        lasix 40mg po qd.   s/p echo,    mod to severe AS. doubt would be a TAVR candidate in setting of chronic hip infection and risk of endocarditis     pulmonary consult to assist   tx of copd  cw xarelto 2.5mg bid for PAD   and "ppx for hx of VTE"   pain control  abx per ID, on iv cefepime. iv vanco., /  mri,  osteo,  foot       dr rosenberg  to  assume  acre

## 2025-02-01 NOTE — PROGRESS NOTE ADULT - SUBJECTIVE AND OBJECTIVE BOX
date of service: 02-01-25 @ 15:09  [facility]  REVIEW OF SYSTEMS:  CONSTITUTIONAL: No fever,  no  weight loss  ENT:  No  tinnitus,   no   vertigo  NECK: No pain or stiffness  RESPIRATORY: No cough, wheezing, chills or hemoptysis;    No Shortness of Breath  CARDIOVASCULAR: No chest pain, palpitations, dizziness  GASTROINTESTINAL: No abdominal or epigastric pain. No nausea, vomiting, or hematemesis; No diarrhea  No melena or hematochezia.  GENITOURINARY: No dysuria, frequency, hematuria, or incontinence  NEUROLOGICAL: No headaches  SKIN: No itching,  no   rash  LYMPH Nodes: No enlarged glands  ENDOCRINE: No heat or cold intolerance  MUSCULOSKELETAL: No joint pain or swelling  PSYCHIATRIC: No depression, anxiety  HEME/LYMPH: No easy bruising, or bleeding gums  ALLERGY AND IMMUNOLOGIC: No hives or eczema	    MEDICATIONS  (STANDING):  albuterol/ipratropium for Nebulization 3 milliLiter(s) Nebulizer every 6 hours  atorvastatin 40 milliGRAM(s) Oral at bedtime  cefepime   IVPB 1000 milliGRAM(s) IV Intermittent every 8 hours  chlorhexidine 2% Cloths 1 Application(s) Topical daily  cloNIDine 0.1 milliGRAM(s) Oral every 12 hours  collagenase Ointment 1 Application(s) Topical daily  fluticasone propionate/ salmeterol 250-50 MICROgram(s) Diskus 1 Dose(s) Inhalation two times a day  furosemide    Tablet 40 milliGRAM(s) Oral daily  gabapentin 400 milliGRAM(s) Oral two times a day  morphine ER Tablet 15 milliGRAM(s) Oral every 12 hours  polyethylene glycol 3350 17 Gram(s) Oral daily  rivaroxaban 2.5 milliGRAM(s) Oral two times a day  senna 2 Tablet(s) Oral at bedtime  thiamine 100 milliGRAM(s) Oral daily  tiotropium 2.5 MICROgram(s) Inhaler 2 Puff(s) Inhalation daily  vancomycin  IVPB 1250 milliGRAM(s) IV Intermittent every 24 hours  vancomycin  IVPB        MEDICATIONS  (PRN):  acetaminophen     Tablet .. 650 milliGRAM(s) Oral every 6 hours PRN Temp greater or equal to 38C (100.4F), Mild Pain (1 - 3)  melatonin 3 milliGRAM(s) Oral at bedtime PRN Insomnia  naloxone Injectable 0.2 milliGRAM(s) IV Push every 3 minutes PRN respiratory depression/ suspected opioid OD  nicotine  Polacrilex Gum 4 milliGRAM(s) Oral four times a day PRN Smoking Cessation  ondansetron Injectable 4 milliGRAM(s) IV Push every 8 hours PRN Nausea and/or Vomiting  oxyCODONE    IR 5 milliGRAM(s) Oral every 4 hours PRN Severe Pain (7 - 10)      Vital Signs Last 24 Hrs  T(C): 37.3 (01 Feb 2025 13:30), Max: 37.3 (01 Feb 2025 13:30)  T(F): 99.1 (01 Feb 2025 13:30), Max: 99.1 (01 Feb 2025 13:30)  HR: 77 (01 Feb 2025 13:30) (77 - 77)  BP: 124/60 (01 Feb 2025 13:30) (124/60 - 170/69)  BP(mean): --  RR: 18 (01 Feb 2025 13:30) (18 - 18)  SpO2: 92% (01 Feb 2025 13:30) (92% - 94%)    Parameters below as of 01 Feb 2025 13:30  Patient On (Oxygen Delivery Method): nasal cannula      CAPILLARY BLOOD GLUCOSE        I&O's Summary    31 Jan 2025 07:01  -  01 Feb 2025 07:00  --------------------------------------------------------  IN: 0 mL / OUT: 1800 mL / NET: -1800 mL    01 Feb 2025 07:01  -  01 Feb 2025 15:09  --------------------------------------------------------  IN: 480 mL / OUT: 1550 mL / NET: -1070 mL          Appearance: Normal	  HEENT:   Normal oral mucosa, PERRL, EOMI	  Lymphatic: No lymphadenopathy  Cardiovascular: Normal S1 S2, No JVD  Respiratory: Lungs clear to auscultation	  Gastrointestinal:  Soft, Non-tender, + BS	  Skin: No rash, No ecchymoses	  Extremities:     LABS:                        10.4   4.70  )-----------( 214      ( 31 Jan 2025 06:54 )             35.0                                   Consultant(s) Notes Reviewed:      Care Discussed with Consultants/Other Providers:

## 2025-02-01 NOTE — PROGRESS NOTE ADULT - SUBJECTIVE AND OBJECTIVE BOX
Cardiovascular Disease Progress Note    Overnight events: No acute events overnight.  no new cardiac sx  Otherwise review of systems negative    Objective Findings:  T(C): 36.8 (02-01-25 @ 06:21), Max: 36.8 (01-31-25 @ 12:09)  HR: 77 (02-01-25 @ 06:21) (77 - 79)  BP: 152/77 (02-01-25 @ 06:21) (124/64 - 170/69)  RR: 18 (02-01-25 @ 06:21) (18 - 18)  SpO2: 94% (02-01-25 @ 06:21) (93% - 95%)  Wt(kg): --  Daily     Daily       Physical Exam:  Gen: NAD  HEENT: EOMI  CV: RRR, normal S1 + S2, no m/r/g  Lungs: CTAB  Abd: soft, non-tender  Ext: No edema    Telemetry:    Laboratory Data:                        10.4   4.70  )-----------( 214      ( 31 Jan 2025 06:54 )             35.0                     Inpatient Medications:  MEDICATIONS  (STANDING):  albuterol/ipratropium for Nebulization 3 milliLiter(s) Nebulizer every 6 hours  atorvastatin 40 milliGRAM(s) Oral at bedtime  cefepime   IVPB 1000 milliGRAM(s) IV Intermittent every 8 hours  chlorhexidine 2% Cloths 1 Application(s) Topical daily  cloNIDine 0.1 milliGRAM(s) Oral every 12 hours  collagenase Ointment 1 Application(s) Topical daily  fluticasone propionate/ salmeterol 250-50 MICROgram(s) Diskus 1 Dose(s) Inhalation two times a day  furosemide    Tablet 40 milliGRAM(s) Oral daily  gabapentin 400 milliGRAM(s) Oral two times a day  morphine ER Tablet 15 milliGRAM(s) Oral every 12 hours  polyethylene glycol 3350 17 Gram(s) Oral daily  rivaroxaban 2.5 milliGRAM(s) Oral two times a day  senna 2 Tablet(s) Oral at bedtime  thiamine 100 milliGRAM(s) Oral daily  tiotropium 2.5 MICROgram(s) Inhaler 2 Puff(s) Inhalation daily  vancomycin  IVPB 1250 milliGRAM(s) IV Intermittent every 24 hours  vancomycin  IVPB          Assessment:  79F w/ hx of ETOH in past, PAD w/ b/l carotid artery stenosis, HFpEF EF 64% w/ severe AS, COPD, HTN, CAD, RLE DVT 8/2023 w/ hx of R hip infection p/w worsening R hip pain and hypoxic resp failure    Recs:  cardiac stable  no e/o acs  cw antiplatelet, statin and antianginals for cad/stent. denies chest pain. hold off on ischemic eval for now   vol status stable =cw lasix 40mg po qd. gentle diuresis as patient is preload dependent in setting of AS  s/p echo, results noted. mod to severe AS. doubt would be a TAVR candidate in setting of chronic hip infection and risk of endocarditis  consider pulmonary consult to assist with tx of copd  cw xarelto 2.5mg bid for PAD dosing and "ppx for hx of VTE"   pain control  abx per ID  f/u ortho recs      Over 25 minutes spent on total encounter; more than 50% of the visit was spent counseling and/or coordinating care by the attending physician.      Chaka Lawrence MD   Cardiovascular Disease  [phone]

## 2025-02-02 LAB
-  LEVOFLOXACIN: SIGNIFICANT CHANGE UP
-  MINOCYCLINE: SIGNIFICANT CHANGE UP
-  TRIMETHOPRIM/SULFAMETHOXAZOLE: SIGNIFICANT CHANGE UP
ANION GAP SERPL CALC-SCNC: 9 MMOL/L — SIGNIFICANT CHANGE UP (ref 5–17)
BUN SERPL-MCNC: 31 MG/DL — HIGH (ref 7–23)
CALCIUM SERPL-MCNC: 8.8 MG/DL — SIGNIFICANT CHANGE UP (ref 8.4–10.5)
CHLORIDE SERPL-SCNC: 99 MMOL/L — SIGNIFICANT CHANGE UP (ref 96–108)
CO2 SERPL-SCNC: 34 MMOL/L — HIGH (ref 22–31)
CREAT SERPL-MCNC: 0.88 MG/DL — SIGNIFICANT CHANGE UP (ref 0.5–1.3)
CULTURE RESULTS: SIGNIFICANT CHANGE UP
CULTURE RESULTS: SIGNIFICANT CHANGE UP
EGFR: 68 ML/MIN/1.73M2 — SIGNIFICANT CHANGE UP
GLUCOSE SERPL-MCNC: 127 MG/DL — HIGH (ref 70–99)
HCT VFR BLD CALC: 35.6 % — SIGNIFICANT CHANGE UP (ref 34.5–45)
HGB BLD-MCNC: 10.7 G/DL — LOW (ref 11.5–15.5)
MAGNESIUM SERPL-MCNC: 1.9 MG/DL — SIGNIFICANT CHANGE UP (ref 1.6–2.6)
MCHC RBC-ENTMCNC: 29.9 PG — SIGNIFICANT CHANGE UP (ref 27–34)
MCHC RBC-ENTMCNC: 30.1 G/DL — LOW (ref 32–36)
MCV RBC AUTO: 99.4 FL — SIGNIFICANT CHANGE UP (ref 80–100)
METHOD TYPE: SIGNIFICANT CHANGE UP
METHOD TYPE: SIGNIFICANT CHANGE UP
NRBC # BLD: 0 /100 WBCS — SIGNIFICANT CHANGE UP (ref 0–0)
NRBC BLD-RTO: 0 /100 WBCS — SIGNIFICANT CHANGE UP (ref 0–0)
PHOSPHATE SERPL-MCNC: 4.1 MG/DL — SIGNIFICANT CHANGE UP (ref 2.5–4.5)
PLATELET # BLD AUTO: 260 K/UL — SIGNIFICANT CHANGE UP (ref 150–400)
POTASSIUM SERPL-MCNC: 4.4 MMOL/L — SIGNIFICANT CHANGE UP (ref 3.5–5.3)
POTASSIUM SERPL-SCNC: 4.4 MMOL/L — SIGNIFICANT CHANGE UP (ref 3.5–5.3)
RBC # BLD: 3.58 M/UL — LOW (ref 3.8–5.2)
RBC # FLD: 16.6 % — HIGH (ref 10.3–14.5)
SODIUM SERPL-SCNC: 142 MMOL/L — SIGNIFICANT CHANGE UP (ref 135–145)
SPECIMEN SOURCE: SIGNIFICANT CHANGE UP
SPECIMEN SOURCE: SIGNIFICANT CHANGE UP
VANCOMYCIN TROUGH SERPL-MCNC: 13.3 UG/ML — SIGNIFICANT CHANGE UP (ref 10–20)
WBC # BLD: 4.43 K/UL — SIGNIFICANT CHANGE UP (ref 3.8–10.5)
WBC # FLD AUTO: 4.43 K/UL — SIGNIFICANT CHANGE UP (ref 3.8–10.5)

## 2025-02-02 RX ADMIN — Medication 2 TABLET(S): at 22:36

## 2025-02-02 RX ADMIN — CEFEPIME 100 MILLIGRAM(S): 2 INJECTION, POWDER, FOR SOLUTION INTRAVENOUS at 22:36

## 2025-02-02 RX ADMIN — IPRATROPIUM BROMIDE AND ALBUTEROL SULFATE 3 MILLILITER(S): .5; 2.5 SOLUTION RESPIRATORY (INHALATION) at 23:23

## 2025-02-02 RX ADMIN — OXYCODONE HYDROCHLORIDE 5 MILLIGRAM(S): 30 TABLET ORAL at 13:30

## 2025-02-02 RX ADMIN — Medication 3 MILLIGRAM(S): at 22:36

## 2025-02-02 RX ADMIN — Medication 0.1 MILLIGRAM(S): at 06:22

## 2025-02-02 RX ADMIN — Medication 0.1 MILLIGRAM(S): at 17:28

## 2025-02-02 RX ADMIN — Medication 1 DOSE(S): at 06:23

## 2025-02-02 RX ADMIN — Medication 15 MILLIGRAM(S): at 17:28

## 2025-02-02 RX ADMIN — Medication 100 MILLIGRAM(S): at 12:52

## 2025-02-02 RX ADMIN — RIVAROXABAN 2.5 MILLIGRAM(S): 10 TABLET, FILM COATED ORAL at 17:28

## 2025-02-02 RX ADMIN — FUROSEMIDE 40 MILLIGRAM(S): 10 INJECTION INTRAMUSCULAR; INTRAVENOUS at 06:19

## 2025-02-02 RX ADMIN — IPRATROPIUM BROMIDE AND ALBUTEROL SULFATE 3 MILLILITER(S): .5; 2.5 SOLUTION RESPIRATORY (INHALATION) at 00:50

## 2025-02-02 RX ADMIN — Medication 1 DOSE(S): at 17:29

## 2025-02-02 RX ADMIN — IPRATROPIUM BROMIDE AND ALBUTEROL SULFATE 3 MILLILITER(S): .5; 2.5 SOLUTION RESPIRATORY (INHALATION) at 06:20

## 2025-02-02 RX ADMIN — Medication 166.67 MILLIGRAM(S): at 12:52

## 2025-02-02 RX ADMIN — IPRATROPIUM BROMIDE AND ALBUTEROL SULFATE 3 MILLILITER(S): .5; 2.5 SOLUTION RESPIRATORY (INHALATION) at 17:28

## 2025-02-02 RX ADMIN — OXYCODONE HYDROCHLORIDE 5 MILLIGRAM(S): 30 TABLET ORAL at 12:52

## 2025-02-02 RX ADMIN — GABAPENTIN 400 MILLIGRAM(S): 400 CAPSULE ORAL at 06:20

## 2025-02-02 RX ADMIN — Medication 1 APPLICATION(S): at 12:54

## 2025-02-02 RX ADMIN — GABAPENTIN 400 MILLIGRAM(S): 400 CAPSULE ORAL at 17:28

## 2025-02-02 RX ADMIN — RIVAROXABAN 2.5 MILLIGRAM(S): 10 TABLET, FILM COATED ORAL at 06:21

## 2025-02-02 RX ADMIN — IPRATROPIUM BROMIDE AND ALBUTEROL SULFATE 3 MILLILITER(S): .5; 2.5 SOLUTION RESPIRATORY (INHALATION) at 12:51

## 2025-02-02 RX ADMIN — ATORVASTATIN CALCIUM 40 MILLIGRAM(S): 80 TABLET, FILM COATED ORAL at 22:36

## 2025-02-02 RX ADMIN — Medication 15 MILLIGRAM(S): at 18:00

## 2025-02-02 RX ADMIN — TIOTROPIUM BROMIDE INHALATION SPRAY 2 PUFF(S): 3.12 SPRAY, METERED RESPIRATORY (INHALATION) at 12:54

## 2025-02-02 RX ADMIN — Medication 15 MILLIGRAM(S): at 06:20

## 2025-02-02 RX ADMIN — CEFEPIME 100 MILLIGRAM(S): 2 INJECTION, POWDER, FOR SOLUTION INTRAVENOUS at 01:07

## 2025-02-02 RX ADMIN — CEFEPIME 100 MILLIGRAM(S): 2 INJECTION, POWDER, FOR SOLUTION INTRAVENOUS at 15:34

## 2025-02-02 RX ADMIN — CEFEPIME 100 MILLIGRAM(S): 2 INJECTION, POWDER, FOR SOLUTION INTRAVENOUS at 06:21

## 2025-02-02 NOTE — PROGRESS NOTE ADULT - ASSESSMENT
_________________________________________________________________________________________  ========>>  M E D I C A L   A T T E N D I N G    F O L L O W  U P  N O T E  <<=========  -----------------------------------------------------------------------------------------------------    - Patient seen and examined by me earlier today.   - Patient today overall doing fairly, still complains of pain.. patient already educated on the medications she is already on including standing and PRN medications.. )    ==================>> REVIEW OF SYSTEM <<=================    GEN: no fever, no chills, pain in right hip area..   RESP: no SOB, no cough, no sputum  CVS: no chest pain, no palpitations  GI: no abdominal pain, no nausea  : no dysuria, no frequency  Neuro: no headache, no dizziness    ==================>> PHYSICAL EXAM <<=================    GEN: A&O X 3 , NAD , comfortable, as above ..   HEENT: NCAT, PERRL, MMM, hearing intact  CVS: S1S2 , regular , No M/R/G appreciated  PULM: scattered wheez >> decreased   ABD.: soft. non tender, non distended,  Extrem: intact pulses , leg edema decreased : stasis changes , wounds dressed        ( Note written / Date of service 02-02-25 ( This is certified to be the same as "ENTERED" date above ( for billing purposes)))    ==================>> MEDICATIONS <<====================    albuterol/ipratropium for Nebulization 3 milliLiter(s) Nebulizer every 6 hours  atorvastatin 40 milliGRAM(s) Oral at bedtime  cefepime   IVPB 1000 milliGRAM(s) IV Intermittent every 8 hours  chlorhexidine 2% Cloths 1 Application(s) Topical daily  cloNIDine 0.1 milliGRAM(s) Oral every 12 hours  collagenase Ointment 1 Application(s) Topical daily  fluticasone propionate/ salmeterol 250-50 MICROgram(s) Diskus 1 Dose(s) Inhalation two times a day  furosemide    Tablet 40 milliGRAM(s) Oral daily  gabapentin 400 milliGRAM(s) Oral two times a day  morphine ER Tablet 15 milliGRAM(s) Oral every 12 hours  polyethylene glycol 3350 17 Gram(s) Oral daily  rivaroxaban 2.5 milliGRAM(s) Oral two times a day  senna 2 Tablet(s) Oral at bedtime  thiamine 100 milliGRAM(s) Oral daily  tiotropium 2.5 MICROgram(s) Inhaler 2 Puff(s) Inhalation daily  vancomycin  IVPB 1250 milliGRAM(s) IV Intermittent every 24 hours  vancomycin  IVPB        MEDICATIONS  (PRN):  acetaminophen     Tablet .. 650 milliGRAM(s) Oral every 6 hours PRN Temp greater or equal to 38C (100.4F), Mild Pain (1 - 3)  melatonin 3 milliGRAM(s) Oral at bedtime PRN Insomnia  naloxone Injectable 0.2 milliGRAM(s) IV Push every 3 minutes PRN respiratory depression/ suspected opioid OD  nicotine  Polacrilex Gum 4 milliGRAM(s) Oral four times a day PRN Smoking Cessation  ondansetron Injectable 4 milliGRAM(s) IV Push every 8 hours PRN Nausea and/or Vomiting  oxyCODONE    IR 5 milliGRAM(s) Oral every 4 hours PRN Severe Pain (7 - 10)    ___________  Active diet:  Diet, DASH/TLC:   Sodium & Cholesterol Restricted  ___________________    ==================>> VITAL SIGNS <<==================    Vital Signs Last 24 HrsT(C): 37.1 (02-02-25 @ 11:29)  T(F): 98.7 (02-02-25 @ 11:29), Max: 98.7 (02-02-25 @ 11:29)  HR: 79 (02-02-25 @ 11:29) (79 - 84)  BP: 121/72 (02-02-25 @ 11:29)  RR: 18 (02-02-25 @ 11:29) (18 - 18)  SpO2: 93% (02-02-25 @ 11:29) (92% - 93%)       ==================>> LAB AND IMAGING <<==================                        10.7   4.43  )-----------( 260      ( 02 Feb 2025 07:13 )             35.6        02-02    142  |  99  |  31[H]  ----------------------------<  127[H]  4.4   |  34[H]  |  0.88    Ca    8.8      02 Feb 2025 07:47  Phos  4.1     02-02  Mg     1.9     02-02    WBC count:   4.43 <<== ,  4.70 <<== ,  4.18 <<==   Hemoglobin:   10.7 <<==,  10.4 <<==,  9.2 <<==  platelets:  260 <==, 214 <==, 199 <==, 225 <==, 253 <==    Creatinine:  0.88  <<==, 0.84  <<==, 1.08  <<==, 0.93  <<==  Sodium:   142  <==, 137  <==, 141  <==, 141  <==       AST:          14(01-30) <== , 15(01-27) <== , 24(01-27) <==      ALT:        8(01-30)  <== , 11(01-27)  <== , 15(01-27)  <==      AP:        80(01-30)  <=, 109(01-27)  <=, 121(01-27)  <=     Bili:        0.4(01-30)  <=, 0.3(01-27)  <=, 0.3(01-27)  <=    ____________________________    M I C R O B I O L O G Y :    Culture - Wound Aerobic/Anaerobic (collected 29 Jan 2025 07:19)  Source: Skin/Wound  Preliminary Report (02 Feb 2025 13:14):    Numerous Pseudomonas aeruginosa    Rare Methicillin Resistant Staphylococcus aureus    Rare Stenotrophomonas maltophilia  Organism: Stenotrophomonas maltophilia (02 Feb 2025 13:13)    Sensitivities:      Method Type: DEVAN      -  Levofloxacin: S 1      -  Trimethoprim/Sulfamethoxazole: S <=0.5/9.5  Organism: Pseudomonas aeruginosa  Methicillin resistant Staphylococcus aureus  Stenotrophomonas maltophilia (02 Feb 2025 12:37)  Organism: Stenotrophomonas maltophilia (02 Feb 2025 12:37)    Sensitivities:      Method Type: KB      -  Minocycline: S  Organism: Methicillin resistant Staphylococcus aureus (31 Jan 2025 10:25)    Sensitivities:      Method Type: DEVAN      -  Clindamycin: S <=0.25      -  Daptomycin: S 1      -  Erythromycin: R >4      -  Gentamicin: S <=4 Should not be used as monotherapy      -  Linezolid: S 2      -  Oxacillin: R >2      -  Penicillin: R >2      -  Rifampin: S <=1 Should not be used as monotherapy      -  Tetracycline: I 8      -  Trimethoprim/Sulfamethoxazole: S <=0.5/9.5      -  Vancomycin: S 1  Organism: Pseudomonas aeruginosa (31 Jan 2025 10:23)    Sensitivities:      Method Type: DEVAN      -  Amikacin: S <=16      -  Aztreonam: S <=4      -  Cefepime: S <=2      -  Ceftazidime: S 4      -  Ciprofloxacin: S <=0.25      -  Imipenem: S <=1      -  Levofloxacin: S <=0.5      -  Meropenem: S <=1      -  Piperacillin/Tazobactam: S <=8    Culture - Wound Aerobic/Anaerobic (collected 29 Jan 2025 07:19)  Source: Skin/Wound  Preliminary Report (31 Jan 2025 10:22):    Moderate Methicillin Resistant Staphylococcus aureus  Organism: Methicillin resistant Staphylococcus aureus (31 Jan 2025 10:22)  Organism: Methicillin resistant Staphylococcus aureus (31 Jan 2025 10:22)    Sensitivities:      Method Type: DEVAN      -  Clindamycin: S <=0.25      -  Daptomycin: S 1      -  Erythromycin: R >4      -  Gentamicin: S <=4 Should not be used as monotherapy      -  Linezolid: S 2      -  Oxacillin: R >2      -  Penicillin: R >2      -  Rifampin: S <=1 Should not be used as monotherapy      -  Tetracycline: I 8      -  Trimethoprim/Sulfamethoxazole: S <=0.5/9.5      -  Vancomycin: S 1    Culture - Blood (collected 27 Jan 2025 22:17)  Source: .Blood BLOOD  Final Report (02 Feb 2025 04:01):    No growth at 5 days    Culture - Blood (collected 27 Jan 2025 22:10)  Source: .Blood BLOOD  Final Report (02 Feb 2025 04:01):    No growth at 5 days        < from: TTE W or WO Ultrasound Enhancing Agent (01.28.25 @ 13:55) >  CONCLUSIONS:    1. Left ventricular cavity is small. Left ventricular wall thickness is normal. Left ventricular systolic function is normal with an ejection fraction of 73 % by Rios's method of disks.   2. Mildly enlarged right ventricular cavity size, with normal wall thickness, and normal right ventricular systolic function.   3. Moderate to severe aortic stenosis.   4. Moderate tricuspid regurgitation.   5. Estimated pulmonary artery systolic pressure is 75 mmHg, consistent with severe pulmonary hypertension.   6. No pericardial effusion seen.   7. Compared to the transthoracic echocardiogram performed on 4/12/2023, there have been no significant interval changes.  < end of copied text >    < from: CT Angio Chest PE Protocol w/ IV Cont (01.29.25 @ 08:47) >  IMPRESSION:  No pulmonary embolism.  Right upper lobe groundglass opacities and right lower lobe nodular   opacities are likely infectious/inflammatory.  Small bilateral pleural effusions.  < end of copied text >    < from: [facility] Duplex Lower Ext Vein Scan, Bilat (01.27.25 @ 20:06) >  IMPRESSION:  No evidence of deep venous thrombosis in either lower extremity.  < end of copied text >    < from: Xray Knee 3 Views, Right (01.27.25 @ 23:18) >  IMPRESSION:  1.  Right longstem femoral revision hardware with lucency surrounding the   hardware and marked subsidence in keeping with loosening.  2.  Lucency at the lateral cortex of the proximal femur may reflect   postoperative change versus osteolysis.  3.  Pseudoarticulation between the proximal femur and right iliac bone  < end of copied text >    < from: VA Duplex Lower Ext Vein Scan, Bilat (01.27.25 @ 20:06) >  IMPRESSION:  No evidence of deep venous thrombosis in either lower extremity.  < end of copied text >    < from: TTE W or WO Ultrasound Enhancing Agent (06.17.24 @ 17:12) >  CONCLUSIONS:    1. Left ventricular cavity is normal in size. Left ventricular wall thickness is normal. Left ventricular systolic function is normal with an ejection fraction of 64 % by Rios's method of disks. There are no regional wall motion abnormalities seen.   2. There is mild (grade 1) left ventricular diastolic dysfunction.   3. Normal right ventricular cavity size and normal systolic function.   4. Structurally normal mitral valve with normal leaflet excursion. There is calcification of the mitral valve annulus. There is mild mitral regurgitation.   5. The aortic valve anatomy cannot be determined with reduced systolic excursion. There is calcification of the aortic valve leaflets. There is severe aortic stenosis. The peak transaortic velocity is 3.94 m/s, peak transaortic gradient is 62.1 mmHg and mean transaortic gradient is 32.0 mmHg with an LVOT/aortic valve VTI ratio of 0.22. The aorticvalve area is estimated at 0.51 cm² by the continuity equation. There is mild to moderate aortic regurgitation.   6. The left atrium is mildly dilated with an indexed volume of 41 ml/m².   7. No prior echocardiogram is available for comparison.  < end of copied text >    ___________________________________________________________________________________  ===============>>  A S S E S S M E N T   A N D   P L A N <<===============  ------------------------------------------------------------------------------------------    79F w/ hx of ETOH in past, PAD w/ b/l carotid artery stenosis, HFpEF EF 64% w/ severe AS, COPD, HTN, CAD, RLE DVT 8/2023 w/ hx of R hip infection p/w worsening R hip pain     Problem/Plan - 1:  ·  Problem: right hip pain.   ·  Plan: Patient endorsing severe R hip pain w/o inciting fall or traumatic event. 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 06/2024 admission with possible joint infection. No clear signs of infection currently and exam is equivocal. -Pain control as ordered [noting there is some substance use history]     >> pain management follow up and further management            Bowel regimen as needed   -cultures as above   - Ortho following , noted   - antibiotics  per ID: adjusted as noted   -Trend wbc, fever curve can consider ID consult pending cultures..     ## additional osteomyelitis of ankle     podiatry following, appreciated    ID following for antibiotics Rx >> will need long term antibiotics Rx ( patient at this time not interested in going to rehabilitation but unclear if she can take of administration of antibiotics at home ! )     otherwise as above    local wound care     Problem/Plan - 2:  ·  Problem: acute exacerbation of Chronic heart failure with preserved ejection fraction (HFpEF), edema, elevated BNP     in patient with severe Aortic stenosis as above   repeat TTE as above with AS, MR, PAH  diuretics per cardio   -Daily weight and I/O  cardio following   Xarelto 2.5 mg BID per cardio    CTA and Duplex negative for VTE      Problem/Plan - 3:  ·  Problem: COPD  ·  Plan: Notable wheeze on exam on presentation   ? has 02 PRN at home. Patient smoking tobacco, not clarifying how much.  -Duonebs Q6hrs  -Cont. Symbicort BID  -Cont. Spiriva    ## pneumonia on CT as above >> started on antibiotics >> monitor     lung sounds and breathing better     ID on board      Problem/Plan - 4:  ·  Problem: CAD (coronary artery disease).   ·  Plan: Patient states she does not take aspirin or atorvastatin at home.  cardio following, appreciated     Problem/Plan - 5:  ·  Problem: EtOH dependence.   no signs of withdrawal      Problem/Plan - 6:  ·  Problem: Severe aortic stenosis.   ·  Plan: hx of severe AS, note LE edema  repeat echo as above : unchanged   cardio appreciated     Problem/Plan - 7:  ·  Problem: Essential hypertension.   ·  Plan: -Trend BP  -Cont. Clonidine BID  -Patient does not recall taking Nifedipine, can resume prior dose if BPs uncontrolled.     Problem/Plan - 8:  ·  Problem: Prophylactic measure.   ·  Plan: DVT PPx  xarelto     --------------------------------------------  Case discussed with patient, daughter updated before..    Education given on findings and plan of care    Today I had a prolonged conversation with the patient, DAQUAN TOMLINCATHERINE, re diagnosis, findings, and plan of care, options, alternatives, prognosis... . Patient verbalized clear understanding, all questions answered.  I separately spoke with patient's  daughter ( with her permission) in great detail given multiple complicated medical issues including sever AS, COPD, pneumonia, OM and wound and prosthetic infections pt's continued smoking, ETOH use and pain medication abuse ..   patient would be at high risk for any orthopedic surgery..     patient seen according to [facility] cost code ( [id number] /[id number] /)     Additional time spent  35 min.  ___________________________  H. GONZALO Perez.  Pager: [phone]

## 2025-02-03 LAB
CULTURE RESULTS: ABNORMAL
CULTURE RESULTS: ABNORMAL
ORGANISM # SPEC MICROSCOPIC CNT: ABNORMAL
SPECIMEN SOURCE: SIGNIFICANT CHANGE UP
SPECIMEN SOURCE: SIGNIFICANT CHANGE UP

## 2025-02-03 PROCEDURE — G0545: CPT

## 2025-02-03 PROCEDURE — 99232 SBSQ HOSP IP/OBS MODERATE 35: CPT

## 2025-02-03 RX ORDER — FUROSEMIDE 10 MG/ML
20 INJECTION INTRAMUSCULAR; INTRAVENOUS DAILY
Refills: 0 | Status: DISCONTINUED | OUTPATIENT
Start: 2025-02-03 | End: 2025-02-11

## 2025-02-03 RX ORDER — SULFAMETHOXAZOLE/TRIMETHOPRIM 800-160 MG
2 TABLET ORAL
Refills: 0 | Status: DISCONTINUED | OUTPATIENT
Start: 2025-02-03 | End: 2025-02-10

## 2025-02-03 RX ADMIN — OXYCODONE HYDROCHLORIDE 5 MILLIGRAM(S): 30 TABLET ORAL at 12:19

## 2025-02-03 RX ADMIN — Medication 166.67 MILLIGRAM(S): at 14:31

## 2025-02-03 RX ADMIN — TIOTROPIUM BROMIDE INHALATION SPRAY 2 PUFF(S): 3.12 SPRAY, METERED RESPIRATORY (INHALATION) at 12:22

## 2025-02-03 RX ADMIN — CEFEPIME 100 MILLIGRAM(S): 2 INJECTION, POWDER, FOR SOLUTION INTRAVENOUS at 22:40

## 2025-02-03 RX ADMIN — Medication 3 MILLIGRAM(S): at 23:41

## 2025-02-03 RX ADMIN — OXYCODONE HYDROCHLORIDE 5 MILLIGRAM(S): 30 TABLET ORAL at 12:45

## 2025-02-03 RX ADMIN — Medication 15 MILLIGRAM(S): at 05:53

## 2025-02-03 RX ADMIN — Medication 1 DOSE(S): at 17:12

## 2025-02-03 RX ADMIN — Medication 1 DOSE(S): at 05:56

## 2025-02-03 RX ADMIN — IPRATROPIUM BROMIDE AND ALBUTEROL SULFATE 3 MILLILITER(S): .5; 2.5 SOLUTION RESPIRATORY (INHALATION) at 17:14

## 2025-02-03 RX ADMIN — CEFEPIME 100 MILLIGRAM(S): 2 INJECTION, POWDER, FOR SOLUTION INTRAVENOUS at 14:31

## 2025-02-03 RX ADMIN — RIVAROXABAN 2.5 MILLIGRAM(S): 10 TABLET, FILM COATED ORAL at 17:12

## 2025-02-03 RX ADMIN — Medication 0.1 MILLIGRAM(S): at 17:11

## 2025-02-03 RX ADMIN — FUROSEMIDE 40 MILLIGRAM(S): 10 INJECTION INTRAMUSCULAR; INTRAVENOUS at 05:52

## 2025-02-03 RX ADMIN — Medication 1 APPLICATION(S): at 12:20

## 2025-02-03 RX ADMIN — OXYCODONE HYDROCHLORIDE 5 MILLIGRAM(S): 30 TABLET ORAL at 22:30

## 2025-02-03 RX ADMIN — CEFEPIME 100 MILLIGRAM(S): 2 INJECTION, POWDER, FOR SOLUTION INTRAVENOUS at 05:56

## 2025-02-03 RX ADMIN — Medication 15 MILLIGRAM(S): at 18:00

## 2025-02-03 RX ADMIN — OXYCODONE HYDROCHLORIDE 5 MILLIGRAM(S): 30 TABLET ORAL at 21:50

## 2025-02-03 RX ADMIN — FUROSEMIDE 20 MILLIGRAM(S): 10 INJECTION INTRAMUSCULAR; INTRAVENOUS at 12:21

## 2025-02-03 RX ADMIN — IPRATROPIUM BROMIDE AND ALBUTEROL SULFATE 3 MILLILITER(S): .5; 2.5 SOLUTION RESPIRATORY (INHALATION) at 05:52

## 2025-02-03 RX ADMIN — GABAPENTIN 400 MILLIGRAM(S): 400 CAPSULE ORAL at 17:11

## 2025-02-03 RX ADMIN — RIVAROXABAN 2.5 MILLIGRAM(S): 10 TABLET, FILM COATED ORAL at 05:53

## 2025-02-03 RX ADMIN — Medication 15 MILLIGRAM(S): at 17:12

## 2025-02-03 RX ADMIN — Medication 1 APPLICATION(S): at 12:22

## 2025-02-03 RX ADMIN — Medication 2 TABLET(S): at 18:39

## 2025-02-03 RX ADMIN — OXYCODONE HYDROCHLORIDE 5 MILLIGRAM(S): 30 TABLET ORAL at 17:11

## 2025-02-03 RX ADMIN — GABAPENTIN 400 MILLIGRAM(S): 400 CAPSULE ORAL at 05:53

## 2025-02-03 RX ADMIN — OXYCODONE HYDROCHLORIDE 5 MILLIGRAM(S): 30 TABLET ORAL at 18:00

## 2025-02-03 RX ADMIN — IPRATROPIUM BROMIDE AND ALBUTEROL SULFATE 3 MILLILITER(S): .5; 2.5 SOLUTION RESPIRATORY (INHALATION) at 12:19

## 2025-02-03 RX ADMIN — Medication 0.1 MILLIGRAM(S): at 05:53

## 2025-02-03 NOTE — PROGRESS NOTE ADULT - SUBJECTIVE AND OBJECTIVE BOX
Cardiovascular Disease Progress Note    Overnight events: No acute events overnight.  no new cardiac sx  Otherwise review of systems negative    Objective Findings:  T(C): 36.8 (02-03-25 @ 05:45), Max: 37.1 (02-02-25 @ 11:29)  HR: 71 (02-03-25 @ 05:45) (71 - 80)  BP: 132/67 (02-03-25 @ 05:45) (121/72 - 177/76)  RR: 18 (02-03-25 @ 05:45) (18 - 18)  SpO2: 91% (02-03-25 @ 05:45) (91% - 95%)  Wt(kg): --  Daily     Daily       Physical Exam:  Gen: NAD  HEENT: EOMI  CV: RRR, normal S1 + S2, no m/r/g  Lungs: CTAB  Abd: soft, non-tender  Ext: No edema    Telemetry:    Laboratory Data:                        10.7   4.43  )-----------( 260      ( 02 Feb 2025 07:13 )             35.6     02-02    142  |  99  |  31[H]  ----------------------------<  127[H]  4.4   |  34[H]  |  0.88    Ca    8.8      02 Feb 2025 07:47  Phos  4.1     02-02  Mg     1.9     02-02                Inpatient Medications:  MEDICATIONS  (STANDING):  albuterol/ipratropium for Nebulization 3 milliLiter(s) Nebulizer every 6 hours  atorvastatin 40 milliGRAM(s) Oral at bedtime  cefepime   IVPB 1000 milliGRAM(s) IV Intermittent every 8 hours  chlorhexidine 2% Cloths 1 Application(s) Topical daily  cloNIDine 0.1 milliGRAM(s) Oral every 12 hours  collagenase Ointment 1 Application(s) Topical daily  fluticasone propionate/ salmeterol 250-50 MICROgram(s) Diskus 1 Dose(s) Inhalation two times a day  furosemide    Tablet 40 milliGRAM(s) Oral daily  gabapentin 400 milliGRAM(s) Oral two times a day  morphine ER Tablet 15 milliGRAM(s) Oral every 12 hours  polyethylene glycol 3350 17 Gram(s) Oral daily  rivaroxaban 2.5 milliGRAM(s) Oral two times a day  senna 2 Tablet(s) Oral at bedtime  thiamine 100 milliGRAM(s) Oral daily  tiotropium 2.5 MICROgram(s) Inhaler 2 Puff(s) Inhalation daily  vancomycin  IVPB      vancomycin  IVPB 1250 milliGRAM(s) IV Intermittent every 24 hours      Assessment:  79F w/ hx of ETOH in past, PAD w/ b/l carotid artery stenosis, HFpEF EF 64% w/ severe AS, COPD, HTN, CAD, RLE DVT 8/2023 w/ hx of R hip infection p/w worsening R hip pain and hypoxic resp failure    Recs:  cardiac stable  no e/o acs  cw antiplatelet, statin and antianginals for cad/stent. denies chest pain. hold off on ischemic eval for now   vol status stable =dec lasix to 20mg po qd. gentle diuresis as patient is preload dependent in setting of AS  s/p echo, results noted. mod to severe AS. doubt would be a TAVR candidate in setting of chronic hip infection and risk of endocarditis  tx for copd per primary team  cw xarelto 2.5mg bid for PAD dosing and "ppx for hx of VTE"   pain control  abx per ID  f/u ortho recs          Over 25 minutes spent on total encounter; more than 50% of the visit was spent counseling and/or coordinating care by the attending physician.      Chaka Lawrence MD   Cardiovascular Disease  [phone]

## 2025-02-03 NOTE — PROGRESS NOTE ADULT - ASSESSMENT
_________________________________________________________________________________________  ========>>  M E D I C A L   A T T E N D I N G    F O L L O W  U P  N O T E  <<=========  -----------------------------------------------------------------------------------------------------    - Patient seen and examined by me earlier today.   - Patient today overall the same     patient states does not want to go to rehabilitation but educated need for sport care and help with medications / PT, wound... encouraged to agree to rehabilitation..     ==================>> REVIEW OF SYSTEM <<=================    GEN: no fever, no chills, pain in right hip area..   RESP: no SOB, no cough, no sputum  CVS: no chest pain, no palpitations  GI: no abdominal pain, no nausea  : no dysuria, no frequency  Neuro: no headache, no dizziness    ==================>> PHYSICAL EXAM <<=================    GEN: A&O X 3 , NAD , comfortable, as above .. in bed .. encouraged out of bed to chair with assistance as needed.   HEENT: NCAT, PERRL, MMM, hearing intact  CVS: S1S2 , regular , No M/R/G appreciated  PULM: scattered wheez >> decreased / improved   ABD.: soft. non tender, non distended,  Extrem: intact pulses , leg edema decreased : stasis changes , wounds dressed        ( Note written / Date of service 02-03-25 ( This is certified to be the same as "ENTERED" date above ( for billing purposes)))    ==================>> MEDICATIONS <<====================    albuterol/ipratropium for Nebulization 3 milliLiter(s) Nebulizer every 6 hours  atorvastatin 40 milliGRAM(s) Oral at bedtime  cefepime   IVPB 1000 milliGRAM(s) IV Intermittent every 8 hours  chlorhexidine 2% Cloths 1 Application(s) Topical daily  cloNIDine 0.1 milliGRAM(s) Oral every 12 hours  collagenase Ointment 1 Application(s) Topical daily  fluticasone propionate/ salmeterol 250-50 MICROgram(s) Diskus 1 Dose(s) Inhalation two times a day  furosemide    Tablet 20 milliGRAM(s) Oral daily  gabapentin 400 milliGRAM(s) Oral two times a day  morphine ER Tablet 15 milliGRAM(s) Oral every 12 hours  polyethylene glycol 3350 17 Gram(s) Oral daily  rivaroxaban 2.5 milliGRAM(s) Oral two times a day  senna 2 Tablet(s) Oral at bedtime  thiamine 100 milliGRAM(s) Oral daily  tiotropium 2.5 MICROgram(s) Inhaler 2 Puff(s) Inhalation daily  vancomycin  IVPB 1250 milliGRAM(s) IV Intermittent every 24 hours  vancomycin  IVPB        MEDICATIONS  (PRN):  acetaminophen     Tablet .. 650 milliGRAM(s) Oral every 6 hours PRN Temp greater or equal to 38C (100.4F), Mild Pain (1 - 3)  melatonin 3 milliGRAM(s) Oral at bedtime PRN Insomnia  naloxone Injectable 0.2 milliGRAM(s) IV Push every 3 minutes PRN respiratory depression/ suspected opioid OD  nicotine  Polacrilex Gum 4 milliGRAM(s) Oral four times a day PRN Smoking Cessation  ondansetron Injectable 4 milliGRAM(s) IV Push every 8 hours PRN Nausea and/or Vomiting  oxyCODONE    IR 5 milliGRAM(s) Oral every 4 hours PRN Severe Pain (7 - 10)    ___________  Active diet:  Diet, DASH/TLC:   Sodium & Cholesterol Restricted  ___________________    ==================>> VITAL SIGNS <<==================    Vital Signs Last 24 HrsT(C): 36.8 (02-03-25 @ 05:45)  T(F): 98.2 (02-03-25 @ 05:45), Max: 98.6 (02-02-25 @ 19:32)  HR: 78 (02-03-25 @ 11:21) (71 - 80)  BP: 92/51 (02-03-25 @ 11:21)  RR: 18 (02-03-25 @ 05:45) (18 - 18)  SpO2: 91% (02-03-25 @ 05:45) (91% - 95%)       ==================>> LAB AND IMAGING <<==================                        10.7   4.43  )-----------( 260      ( 02 Feb 2025 07:13 )             35.6        02-02    142  |  99  |  31[H]  ----------------------------<  127[H]  4.4   |  34[H]  |  0.88    Ca    8.8      02 Feb 2025 07:47  Phos  4.1     02-02  Mg     1.9     02-02      WBC count:   4.43 <<== ,  4.70 <<== ,  4.18 <<==   Hemoglobin:   10.7 <<==,  10.4 <<==,  9.2 <<==  platelets:  260 <==, 214 <==, 199 <==    Creatinine:  0.88  <<==, 0.84  <<==  Sodium:   142  <==, 137  <==       AST:          14(01-30) <== , 15(01-27) <== , 24(01-27) <==      ALT:        8(01-30)  <== , 11(01-27)  <== , 15(01-27)  <==      AP:        80(01-30)  <=, 109(01-27)  <=, 121(01-27)  <=     Bili:        0.4(01-30)  <=, 0.3(01-27)  <=, 0.3(01-27)  <=    ____________________________    M I C R O B I O L O G Y :    Culture - Wound Aerobic/Anaerobic (collected 29 Jan 2025 07:19)  Source: Skin/Wound  Final Report (03 Feb 2025 07:50):    Numerous Pseudomonas aeruginosa    Rare Methicillin Resistant Staphylococcus aureus    Rare Stenotrophomonas maltophilia  Organism: Pseudomonas aeruginosa  Methicillin resistant Staphylococcus aureus  Stenotrophomonas maltophilia (03 Feb 2025 07:50)  Organism: Stenotrophomonas maltophilia (03 Feb 2025 07:50)    Sensitivities:      Method Type: KB      -  Minocycline: S  Organism: Stenotrophomonas maltophilia (03 Feb 2025 07:50)    Sensitivities:      Method Type: DEVAN      -  Levofloxacin: S 1      -  Trimethoprim/Sulfamethoxazole: S <=0.5/9.5  Organism: Methicillin resistant Staphylococcus aureus (03 Feb 2025 07:50)    Sensitivities:      Method Type: DEVAN      -  Clindamycin: S <=0.25      -  Daptomycin: S 1      -  Erythromycin: R >4      -  Gentamicin: S <=4 Should not be used as monotherapy      -  Linezolid: S 2      -  Oxacillin: R >2      -  Penicillin: R >2      -  Rifampin: S <=1 Should not be used as monotherapy      -  Tetracycline: I 8      -  Trimethoprim/Sulfamethoxazole: S <=0.5/9.5      -  Vancomycin: S 1  Organism: Pseudomonas aeruginosa (03 Feb 2025 07:50)    Sensitivities:      Method Type: DEVAN      -  Amikacin: S <=16      -  Aztreonam: S <=4      -  Cefepime: S <=2      -  Ceftazidime: S 4      -  Ciprofloxacin: S <=0.25      -  Imipenem: S <=1      -  Levofloxacin: S <=0.5      -  Meropenem: S <=1      -  Piperacillin/Tazobactam: S <=8    Culture - Wound Aerobic/Anaerobic (collected 29 Jan 2025 07:19)  Source: Skin/Wound  Final Report (03 Feb 2025 13:06):    Moderate Methicillin Resistant Staphylococcus aureus    Rare Stenotrophomonas maltophilia See previous culture [id number]    for susceptibility  Organism: Methicillin resistant Staphylococcus aureus (03 Feb 2025 13:06)  Organism: Methicillin resistant Staphylococcus aureus (03 Feb 2025 13:06)    Sensitivities:      Method Type: DEVAN      -  Clindamycin: S <=0.25      -  Daptomycin: S 1      -  Erythromycin: R >4      -  Gentamicin: S <=4 Should not be used as monotherapy      -  Linezolid: S 2      -  Oxacillin: R >2      -  Penicillin: R >2      -  Rifampin: S <=1 Should not be used as monotherapy      -  Tetracycline: I 8      -  Trimethoprim/Sulfamethoxazole: S <=0.5/9.5      -  Vancomycin: S 1    Culture - Blood (collected 27 Jan 2025 22:17)  Source: .Blood BLOOD  Final Report (02 Feb 2025 04:01):    No growth at 5 days    Culture - Blood (collected 27 Jan 2025 22:10)  Source: .Blood BLOOD  Final Report (02 Feb 2025 04:01):    No growth at 5 days      < from: TTE W or WO Ultrasound Enhancing Agent (01.28.25 @ 13:55) >  CONCLUSIONS:    1. Left ventricular cavity is small. Left ventricular wall thickness is normal. Left ventricular systolic function is normal with an ejection fraction of 73 % by Rios's method of disks.   2. Mildly enlarged right ventricular cavity size, with normal wall thickness, and normal right ventricular systolic function.   3. Moderate to severe aortic stenosis.   4. Moderate tricuspid regurgitation.   5. Estimated pulmonary artery systolic pressure is 75 mmHg, consistent with severe pulmonary hypertension.   6. No pericardial effusion seen.   7. Compared to the transthoracic echocardiogram performed on 4/12/2023, there have been no significant interval changes.  < end of copied text >    < from: CT Angio Chest PE Protocol w/ IV Cont (01.29.25 @ 08:47) >  IMPRESSION:  No pulmonary embolism.  Right upper lobe groundglass opacities and right lower lobe nodular   opacities are likely infectious/inflammatory.  Small bilateral pleural effusions.  < end of copied text >    < from: [facility] Duplex Lower Ext Vein Scan, Bilat (01.27.25 @ 20:06) >  IMPRESSION:  No evidence of deep venous thrombosis in either lower extremity.  < end of copied text >    < from: Xray Knee 3 Views, Right (01.27.25 @ 23:18) >  IMPRESSION:  1.  Right longstem femoral revision hardware with lucency surrounding the   hardware and marked subsidence in keeping with loosening.  2.  Lucency at the lateral cortex of the proximal femur may reflect   postoperative change versus osteolysis.  3.  Pseudoarticulation between the proximal femur and right iliac bone  < end of copied text >    < from: VA Duplex Lower Ext Vein Scan, Bilat (01.27.25 @ 20:06) >  IMPRESSION:  No evidence of deep venous thrombosis in either lower extremity.  < end of copied text >    < from: TTE W or WO Ultrasound Enhancing Agent (06.17.24 @ 17:12) >  CONCLUSIONS:    1. Left ventricular cavity is normal in size. Left ventricular wall thickness is normal. Left ventricular systolic function is normal with an ejection fraction of 64 % by Rios's method of disks. There are no regional wall motion abnormalities seen.   2. There is mild (grade 1) left ventricular diastolic dysfunction.   3. Normal right ventricular cavity size and normal systolic function.   4. Structurally normal mitral valve with normal leaflet excursion. There is calcification of the mitral valve annulus. There is mild mitral regurgitation.   5. The aortic valve anatomy cannot be determined with reduced systolic excursion. There is calcification of the aortic valve leaflets. There is severe aortic stenosis. The peak transaortic velocity is 3.94 m/s, peak transaortic gradient is 62.1 mmHg and mean transaortic gradient is 32.0 mmHg with an LVOT/aortic valve VTI ratio of 0.22. The aorticvalve area is estimated at 0.51 cm² by the continuity equation. There is mild to moderate aortic regurgitation.   6. The left atrium is mildly dilated with an indexed volume of 41 ml/m².   7. No prior echocardiogram is available for comparison.  < end of copied text >    ___________________________________________________________________________________  ===============>>  A S S E S S M E N T   A N D   P L A N <<===============  ------------------------------------------------------------------------------------------    79F w/ hx of ETOH in past, PAD w/ b/l carotid artery stenosis, HFpEF EF 64% w/ severe AS, COPD, HTN, CAD, RLE DVT 8/2023 w/ hx of R hip infection p/w worsening R hip pain     Problem/Plan - 1:  ·  Problem: right hip pain.   ·  Plan: Patient endorsing severe R hip pain w/o inciting fall or traumatic event. 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 06/2024 admission with possible joint infection. No clear signs of infection currently and exam is equivocal. -Pain control as ordered [noting there is some substance use history]     >> pain management follow up and further management            Bowel regimen as needed   -cultures as above   - Ortho following , noted   - antibiotics  per ID: ID follow up     ## additional osteomyelitis of ankle     podiatry following, appreciated    ID following for antibiotics Rx >> will need long term antibiotics Rx ( patient at this time not interested in going to rehabilitation but unclear if she can take of administration of antibiotics at home ! )     otherwise as above    local wound care     Problem/Plan - 2:  ·  Problem: acute exacerbation of Chronic heart failure with preserved ejection fraction (HFpEF), edema, elevated BNP     in patient with severe Aortic stenosis as above   repeat TTE as above with AS, MR, PAH  diuretics per cardio   -Daily weight and I/O  cardio following   Xarelto 2.5 mg BID per cardio    CTA and Duplex negative for VTE      Problem/Plan - 3:  ·  Problem: COPD  ·  Plan: Notable wheeze on exam on presentation   ? has 02 PRN at home. Patient smoking tobacco, not clarifying how much.  -Duoneshala Q6hrs  -Cont. Symbicort BID  -Cont. Spiriva    ## pneumonia on CT as above >> started on antibiotics >> monitor     lung sounds and breathing better     ID on board      Problem/Plan - 4:  ·  Problem: CAD (coronary artery disease).   ·  Plan: Patient states she does not take aspirin or atorvastatin at home.  cardio following, appreciated     Problem/Plan - 5:  ·  Problem: EtOH dependence.   no signs of withdrawal      Problem/Plan - 6:  ·  Problem: Severe aortic stenosis.   ·  Plan: hx of severe AS, note LE edema  repeat echo as above : unchanged   cardio appreciated     Problem/Plan - 7:  ·  Problem: Essential hypertension.   ·  Plan: -Trend BP  -Cont. Clonidine BID  -Patient does not recall taking Nifedipine, can resume prior dose if BPs uncontrolled.     Problem/Plan - 8:  ·  Problem: Prophylactic measure.   ·  Plan: DVT PPx  xarelto     --------------------------------------------  Case discussed with patient,   Education given on findings and plan of care  ___________________________  H. GONZALO Perez.  Pager: [phone]

## 2025-02-03 NOTE — PROGRESS NOTE ADULT - SUBJECTIVE AND OBJECTIVE BOX
Follow Up:      Interval History/ROS:Patient is a 79y old  Female who presents with a chief complaint of Worsening R hip pain (03 Feb 2025 15:56)  Afebrile, no new leukocytosis.     Allergies  No Known Allergies        ANTIMICROBIALS:    cefepime   IVPB 1000 every 8 hours  vancomycin  IVPB 1250 every 24 hours  vancomycin  IVPB          OTHER MEDS: MEDICATIONS  (STANDING):  acetaminophen     Tablet .. 650 every 6 hours PRN  albuterol/ipratropium for Nebulization 3 every 6 hours  atorvastatin 40 at bedtime  cloNIDine 0.1 every 12 hours  fluticasone propionate/ salmeterol 250-50 MICROgram(s) Diskus 1 two times a day  furosemide    Tablet 20 daily  gabapentin 400 two times a day  melatonin 3 at bedtime PRN  morphine ER Tablet 15 every 12 hours  ondansetron Injectable 4 every 8 hours PRN  oxyCODONE    IR 5 every 4 hours PRN  polyethylene glycol 3350 17 daily  rivaroxaban 2.5 two times a day  senna 2 at bedtime  tiotropium 2.5 MICROgram(s) Inhaler 2 daily      Vital Signs Last 24 Hrs  T(F): 98.2 (02-03-25 @ 05:45), Max: 99.1 (02-01-25 @ 13:30)    Vital Signs Last 24 Hrs  HR: 78 (02-03-25 @ 11:21) (71 - 80)  BP: 92/51 (02-03-25 @ 11:21) (92/51 - 177/76)  RR: 18 (02-03-25 @ 05:45)  SpO2: 91% (02-03-25 @ 05:45) (91% - 95%)  Wt(kg): --    EXAM:    GA: NAD  HEENT: oral cavity no lesion  CV: nl S1/S2, no RMG  Lungs: CTAB, no distress  Abd: BS+, soft, nontender, no rebounding pain  Ext: no edema  Neuro: No focal deficits  Skin: Intact  IV: no phlebitis    Labs:                        10.7   4.43  )-----------( 260      ( 02 Feb 2025 07:13 )             35.6     02-02    142  |  99  |  31[H]  ----------------------------<  127[H]  4.4   |  34[H]  |  0.88    Ca    8.8      02 Feb 2025 07:47  Phos  4.1     02-02  Mg     1.9     02-02        WBC Trend:  WBC Count: 4.43 (02-02-25 @ 07:13)  WBC Count: 4.70 (01-31-25 @ 06:54)  WBC Count: 4.18 (01-30-25 @ 07:11)      Creatine Trend:  Creatinine: 0.88 (02-02)  Creatinine: 0.84 (01-30)  Creatinine: 1.08 (01-27)  Creatinine: 0.93 (01-27)      Liver Biochemical Testing Trend:  Alanine Aminotransferase (ALT/SGPT): 8 *L* (01-30)  Alanine Aminotransferase (ALT/SGPT): 11 (01-27)  Alanine Aminotransferase (ALT/SGPT): 15 (01-27)  Alanine Aminotransferase (ALT/SGPT): 7 (06-20)  Alanine Aminotransferase (ALT/SGPT): 10 (06-19)  Aspartate Aminotransferase (AST/SGOT): 14 (01-30-25 @ 07:13)  Aspartate Aminotransferase (AST/SGOT): 15 (01-27-25 @ 22:26)  Aspartate Aminotransferase (AST/SGOT): 24 (01-27-25 @ 16:35)  Aspartate Aminotransferase (AST/SGOT): 15 (06-20-24 @ 06:10)  Aspartate Aminotransferase (AST/SGOT): 12 (06-19-24 @ 07:09)  Bilirubin Total: 0.4 (01-30)  Bilirubin Total: 0.3 (01-27)  Bilirubin Total: 0.3 (01-27)  Bilirubin Total: 0.3 (06-20)  Bilirubin Total: 0.2 (06-19)      Trend LDH      Urinalysis Basic - ( 02 Feb 2025 07:47 )    Color: x / Appearance: x / SG: x / pH: x  Gluc: 127 mg/dL / Ketone: x  / Bili: x / Urobili: x   Blood: x / Protein: x / Nitrite: x   Leuk Esterase: x / RBC: x / WBC x   Sq Epi: x / Non Sq Epi: x / Bacteria: x        MICROBIOLOGY:  Vancomycin Level, Trough: 13.3 (02-02 @ 10:21)    MRSA PCR Result.: NotDetec (06-12-24 @ 14:54)      Culture - Blood (collected 27 Jan 2025 22:17)  Source: .Blood BLOOD  Final Report:    No growth at 5 days    Culture - Blood (collected 27 Jan 2025 22:10)  Source: .Blood BLOOD  Final Report:    No growth at 5 days    Culture - Fungal, Body Fluid (collected 11 Jun 2024 19:13)  Source: .Body Fluid RIGHT HIP ASPIRATION  Final Report:    No fungus isolated at 4 weeks.    Culture - Blood (collected 11 Jun 2024 06:44)  Source: .Blood Blood-Peripheral  Final Report:    No growth at 5 days    Culture - Blood (collected 11 Jun 2024 06:35)  Source: .Blood Blood-Peripheral  Final Report:    No growth at 5 days    Culture - Urine (collected 11 Jun 2024 01:33)  Source: Clean Catch Clean Catch (Midstream)  Final Report:    >=3 organisms. Probable collection contamination.    Culture - Urine (collected 17 May 2023 00:30)  Source: Clean Catch Clean Catch (Midstream)  Final Report:    <10,000 CFU/mL Normal Urogenital Mone    Culture - Blood (collected 16 May 2023 23:30)  Source: .Blood Blood-Venous  Final Report:    No Growth Final    Culture - Blood (collected 16 May 2023 23:17)  Source: .Blood Blood-Peripheral  Final Report:    No Growth Final    Culture - Blood (collected 12 Apr 2023 11:02)  Source: .Blood Blood-Venous  Final Report:    No Growth Final                        Legionella Antigen, Urine: Negative (01-29 @ 12:56)          C-Reactive Protein: 53 (01-27)      D-Dimer Assay, Quantitative: 1173 (01-29)            Sedimentation Rate, Erythrocyte: 89 mm/hr (01-27-25 @ 22:26)  Sedimentation Rate, Erythrocyte: 74 mm/hr (06-12-24 @ 07:15)  Sedimentation Rate, Erythrocyte: 25 mm/hr (06-10-24 @ 21:28)      RADIOLOGY:  imaging below personally reviewed        < from: MR Ankle w/wo IV Cont, Right (01.29.25 @ 18:54) >    IMPRESSION:  Lateral soft tissue ankle wound with osteitis of the adjacent fibula.   This area is susceptible to developing osteomyelitis.  No fluid collection.        --- End of Report ---    < end of copied text >

## 2025-02-03 NOTE — PROGRESS NOTE ADULT - ASSESSMENT
79F w/ hx of ETOH in past, PAD w/ b/l carotid artery stenosis, HFpEF EF 64% w/ severe AS, COPD, HTN, CAD, RLE DVT 8/2023 w/ hx of R hip infection p/w worsening R hip pain. Patient is poor historian, not answering all questions she is being asked, continued requests for pain medication. Patient endorses being admitted to [facility] for multiple days for hip pain. States she did not receive antibiotics. Cannot specify nature of her admission. States she fell in front of [employer] before [place] admission but not since. States she was able to bear weight after leaving hospital around 3 days ago but pain has progressively worsened and does not think she can ambulate now. Denies fevers, chills or additional trauma. States she does not remember her medications and is not compliant with many of them outside of hypertension.     Workup:   -MRI R ankle: Lateral soft tissue ankle wound with osteitis of the adjacent fibula. This area is susceptible to developing osteomyelitis. No fluid collection.  -Xray R hip: Right longstem femoral revision hardware with lucency surrounding the hardware and marked subsidence in keeping with loosening.Lucency at the lateral cortex of the proximal femur may reflect postoperative change versus osteolysis.Pseudoarticulation between the proximal femur and right iliac bone  -CT angio chest: No pulmonary embolism. Right upper lobe groundglass opacities and right lower lobe nodular opacities are likely infectious/inflammatory. Small bilateral pleural effusions.  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

## 2025-02-04 LAB
ANION GAP SERPL CALC-SCNC: 9 MMOL/L — SIGNIFICANT CHANGE UP (ref 5–17)
BUN SERPL-MCNC: 36 MG/DL — HIGH (ref 7–23)
CALCIUM SERPL-MCNC: 8.7 MG/DL — SIGNIFICANT CHANGE UP (ref 8.4–10.5)
CHLORIDE SERPL-SCNC: 94 MMOL/L — LOW (ref 96–108)
CO2 SERPL-SCNC: 32 MMOL/L — HIGH (ref 22–31)
CREAT SERPL-MCNC: 1.15 MG/DL — SIGNIFICANT CHANGE UP (ref 0.5–1.3)
EGFR: 49 ML/MIN/1.73M2 — LOW
GLUCOSE SERPL-MCNC: 99 MG/DL — SIGNIFICANT CHANGE UP (ref 70–99)
HCT VFR BLD CALC: 35.2 % — SIGNIFICANT CHANGE UP (ref 34.5–45)
HGB BLD-MCNC: 10.7 G/DL — LOW (ref 11.5–15.5)
MCHC RBC-ENTMCNC: 29.8 PG — SIGNIFICANT CHANGE UP (ref 27–34)
MCHC RBC-ENTMCNC: 30.4 G/DL — LOW (ref 32–36)
MCV RBC AUTO: 98.1 FL — SIGNIFICANT CHANGE UP (ref 80–100)
NRBC # BLD: 0 /100 WBCS — SIGNIFICANT CHANGE UP (ref 0–0)
NRBC BLD-RTO: 0 /100 WBCS — SIGNIFICANT CHANGE UP (ref 0–0)
PLATELET # BLD AUTO: 276 K/UL — SIGNIFICANT CHANGE UP (ref 150–400)
POTASSIUM SERPL-MCNC: 4.3 MMOL/L — SIGNIFICANT CHANGE UP (ref 3.5–5.3)
POTASSIUM SERPL-SCNC: 4.3 MMOL/L — SIGNIFICANT CHANGE UP (ref 3.5–5.3)
RBC # BLD: 3.59 M/UL — LOW (ref 3.8–5.2)
RBC # FLD: 16.2 % — HIGH (ref 10.3–14.5)
SODIUM SERPL-SCNC: 135 MMOL/L — SIGNIFICANT CHANGE UP (ref 135–145)
WBC # BLD: 5.86 K/UL — SIGNIFICANT CHANGE UP (ref 3.8–10.5)
WBC # FLD AUTO: 5.86 K/UL — SIGNIFICANT CHANGE UP (ref 3.8–10.5)

## 2025-02-04 PROCEDURE — 99232 SBSQ HOSP IP/OBS MODERATE 35: CPT

## 2025-02-04 PROCEDURE — 76604 US EXAM CHEST: CPT | Mod: 26

## 2025-02-04 PROCEDURE — 99222 1ST HOSP IP/OBS MODERATE 55: CPT

## 2025-02-04 PROCEDURE — G0545: CPT

## 2025-02-04 PROCEDURE — 99223 1ST HOSP IP/OBS HIGH 75: CPT

## 2025-02-04 PROCEDURE — 71045 X-RAY EXAM CHEST 1 VIEW: CPT | Mod: 26

## 2025-02-04 PROCEDURE — 93308 TTE F-UP OR LMTD: CPT | Mod: 26

## 2025-02-04 RX ORDER — ACETAMINOPHEN 500 MG/5ML
1000 LIQUID (ML) ORAL ONCE
Refills: 0 | Status: COMPLETED | OUTPATIENT
Start: 2025-02-04 | End: 2025-02-04

## 2025-02-04 RX ORDER — MELATONIN 5 MG
3 TABLET ORAL ONCE
Refills: 0 | Status: COMPLETED | OUTPATIENT
Start: 2025-02-04 | End: 2025-02-04

## 2025-02-04 RX ADMIN — OXYCODONE HYDROCHLORIDE 5 MILLIGRAM(S): 30 TABLET ORAL at 20:21

## 2025-02-04 RX ADMIN — POLYETHYLENE GLYCOL 3350 17 GRAM(S): 17 POWDER, FOR SOLUTION ORAL at 12:47

## 2025-02-04 RX ADMIN — GABAPENTIN 400 MILLIGRAM(S): 400 CAPSULE ORAL at 06:28

## 2025-02-04 RX ADMIN — Medication 0.1 MILLIGRAM(S): at 17:50

## 2025-02-04 RX ADMIN — Medication 15 MILLIGRAM(S): at 17:51

## 2025-02-04 RX ADMIN — GABAPENTIN 400 MILLIGRAM(S): 400 CAPSULE ORAL at 17:51

## 2025-02-04 RX ADMIN — Medication 3 MILLIGRAM(S): at 01:49

## 2025-02-04 RX ADMIN — OXYCODONE HYDROCHLORIDE 5 MILLIGRAM(S): 30 TABLET ORAL at 20:51

## 2025-02-04 RX ADMIN — Medication 1 APPLICATION(S): at 13:01

## 2025-02-04 RX ADMIN — Medication 1000 MILLIGRAM(S): at 13:45

## 2025-02-04 RX ADMIN — Medication 1 APPLICATION(S): at 18:40

## 2025-02-04 RX ADMIN — CEFEPIME 100 MILLIGRAM(S): 2 INJECTION, POWDER, FOR SOLUTION INTRAVENOUS at 06:22

## 2025-02-04 RX ADMIN — Medication 0.1 MILLIGRAM(S): at 08:41

## 2025-02-04 RX ADMIN — Medication 1 DOSE(S): at 06:27

## 2025-02-04 RX ADMIN — CEFEPIME 100 MILLIGRAM(S): 2 INJECTION, POWDER, FOR SOLUTION INTRAVENOUS at 22:13

## 2025-02-04 RX ADMIN — Medication 4 MILLILITER(S): at 17:50

## 2025-02-04 RX ADMIN — IPRATROPIUM BROMIDE AND ALBUTEROL SULFATE 3 MILLILITER(S): .5; 2.5 SOLUTION RESPIRATORY (INHALATION) at 12:47

## 2025-02-04 RX ADMIN — Medication 2 TABLET(S): at 06:28

## 2025-02-04 RX ADMIN — OXYCODONE HYDROCHLORIDE 5 MILLIGRAM(S): 30 TABLET ORAL at 09:40

## 2025-02-04 RX ADMIN — OXYCODONE HYDROCHLORIDE 5 MILLIGRAM(S): 30 TABLET ORAL at 13:45

## 2025-02-04 RX ADMIN — Medication 2 TABLET(S): at 17:51

## 2025-02-04 RX ADMIN — RIVAROXABAN 2.5 MILLIGRAM(S): 10 TABLET, FILM COATED ORAL at 17:51

## 2025-02-04 RX ADMIN — OXYCODONE HYDROCHLORIDE 5 MILLIGRAM(S): 30 TABLET ORAL at 12:47

## 2025-02-04 RX ADMIN — Medication 15 MILLIGRAM(S): at 06:28

## 2025-02-04 RX ADMIN — FUROSEMIDE 20 MILLIGRAM(S): 10 INJECTION INTRAMUSCULAR; INTRAVENOUS at 08:41

## 2025-02-04 RX ADMIN — TIOTROPIUM BROMIDE INHALATION SPRAY 2 PUFF(S): 3.12 SPRAY, METERED RESPIRATORY (INHALATION) at 12:53

## 2025-02-04 RX ADMIN — IPRATROPIUM BROMIDE AND ALBUTEROL SULFATE 3 MILLILITER(S): .5; 2.5 SOLUTION RESPIRATORY (INHALATION) at 17:50

## 2025-02-04 RX ADMIN — OXYCODONE HYDROCHLORIDE 5 MILLIGRAM(S): 30 TABLET ORAL at 08:41

## 2025-02-04 RX ADMIN — Medication 1 DOSE(S): at 17:51

## 2025-02-04 RX ADMIN — IPRATROPIUM BROMIDE AND ALBUTEROL SULFATE 3 MILLILITER(S): .5; 2.5 SOLUTION RESPIRATORY (INHALATION) at 06:27

## 2025-02-04 RX ADMIN — CEFEPIME 100 MILLIGRAM(S): 2 INJECTION, POWDER, FOR SOLUTION INTRAVENOUS at 14:11

## 2025-02-04 RX ADMIN — RIVAROXABAN 2.5 MILLIGRAM(S): 10 TABLET, FILM COATED ORAL at 06:28

## 2025-02-04 RX ADMIN — Medication 400 MILLIGRAM(S): at 12:47

## 2025-02-04 NOTE — BH CONSULTATION LIAISON ASSESSMENT NOTE - NSSUICRSKFACTOR_PSY_ALL_CORE
Presenting Symptoms/Treatment Related Factors Presenting Symptoms/Treatment Related Factors/Activating Events/Stressors

## 2025-02-04 NOTE — CONSULT NOTE ADULT - SUBJECTIVE AND OBJECTIVE BOX
CHIEF COMPLAINT: PAin    HPI:  79F w/ hx of ETOH in past, PAD w/ b/l carotid artery stenosis, HFpEF EF 64% w/ severe AS, COPD, HTN, CAD, RLE DVT 8/2023 w/ hx of R hip infection p/w worsening R hip pain. Patient is poor historian, not answering all questions she is being asked, continued requests for pain medication. Patient endorses being admitted to [facility] for multiple days for hip pain. States she did not receive antibiotics.  She has had cnronic joint infections, and has been on suppressive abx.  Her she was found to have osteo and hardware infection but refused surgery.    Pulmonary has been consulted for for worsenign hypoxia and dyspnea.   + cough, + mucus production, thick yellow green.  Chest pain with cough.  Has dyspnea with minimal exertion in bed.   She is an active smoker does not know if she has a lung doctor.    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  Her main complain is pain in her legs.            PAST MEDICAL & SURGICAL HISTORY:  Hypertension      Hyperlipidemia      CAD (coronary artery disease)      Migraine      Anxiety      Emphysema, unspecified      HTN (hypertension)      Anxiety      Coronary artery disease      Stented coronary artery      Seizure      Alcohol abuse      Migraine      Pain of right hip joint      MRSA bacteremia      Essential hypertension      CAD (coronary artery disease)      Elevated brain natriuretic peptide (BNP) level      S/P bladder repair      Status post total hip replacement, right  5/21/18      History of arthroplasty of right hip          FAMILY HISTORY:  Family history of coronary artery disease in daughter (Child)    Family history of essential hypertension        SOCIAL HISTORY:  Smoking: [ ] Never Smoked [ ] Former Smoker (__ packs x ___ years) [ x] Current Smoker  70 pack years   Substance Use: [ ] Never Used [ ] Used ____  EtOH Use:  Marital Status: [ ] Single [ x]  [ ]  [ ]   Sexual History:   Occupation:  Recent Travel:  Country of Birth:  Advance Directives:    Allergies    No Known Allergies    Intolerances        HOME MEDICATIONS:    REVIEW OF SYSTEMS:  Constitutional: See HPI  [x ] All other systems negative  [ ] Unable to assess ROS because ________    OBJECTIVE:  ICU Vital Signs Last 24 Hrs  T(C): 36.8 (04 Feb 2025 12:09), Max: 37.3 (03 Feb 2025 23:50)  T(F): 98.2 (04 Feb 2025 12:09), Max: 99.2 (03 Feb 2025 23:50)  HR: 77 (04 Feb 2025 12:09) (72 - 84)  BP: 133/61 (04 Feb 2025 12:09) (11/70 - 133/74)  BP(mean): --  ABP: --  ABP(mean): --  RR: 18 (04 Feb 2025 12:09) (18 - 18)  SpO2: 92% (04 Feb 2025 12:09) (88% - 94%)    O2 Parameters below as of 04 Feb 2025 12:09  Patient On (Oxygen Delivery Method): nasal cannula  O2 Flow (L/min): 5            02-03 @ 07:01  -  02-04 @ 07:00  --------------------------------------------------------  IN: 0 mL / OUT: 1480 mL / NET: -1480 mL    02-04 @ 07:01 - 02-04 @ 17:01  --------------------------------------------------------  IN: 500 mL / OUT: 0 mL / NET: 500 mL      CAPILLARY BLOOD GLUCOSE          PHYSICAL EXAM:  General:  nad  Neck: nO jvd  Respiratory: Ronchi and some wheeze. no distress  Cardiovascular: S1 S2 RRR  Abdomen: Soft NT ND  Extremities: wounds dressed, some residual edema on wrikcled LEs.   Skin: multiple wounds.   Neurological:  Psychiatry: Anxious and tangential though overall plesent lady.     HOSPITAL MEDICATIONS:  Standing Meds:  albuterol/ipratropium for Nebulization 3 milliLiter(s) Nebulizer every 6 hours  atorvastatin 40 milliGRAM(s) Oral at bedtime  cefepime   IVPB 1000 milliGRAM(s) IV Intermittent every 8 hours  chlorhexidine 2% Cloths 1 Application(s) Topical daily  cloNIDine 0.1 milliGRAM(s) Oral every 12 hours  collagenase Ointment 1 Application(s) Topical daily  fluticasone propionate/ salmeterol 250-50 MICROgram(s) Diskus 1 Dose(s) Inhalation two times a day  furosemide    Tablet 20 milliGRAM(s) Oral daily  gabapentin 400 milliGRAM(s) Oral two times a day  morphine ER Tablet 15 milliGRAM(s) Oral every 12 hours  polyethylene glycol 3350 17 Gram(s) Oral daily  rivaroxaban 2.5 milliGRAM(s) Oral two times a day  senna 2 Tablet(s) Oral at bedtime  sodium chloride 3%  Inhalation 4 milliLiter(s) Inhalation every 6 hours  thiamine 100 milliGRAM(s) Oral daily  tiotropium 2.5 MICROgram(s) Inhaler 2 Puff(s) Inhalation daily  trimethoprim  160 mG/sulfamethoxazole 800 mG 2 Tablet(s) Oral two times a day      PRN Meds:  acetaminophen     Tablet .. 650 milliGRAM(s) Oral every 6 hours PRN  melatonin 3 milliGRAM(s) Oral at bedtime PRN  naloxone Injectable 0.2 milliGRAM(s) IV Push every 3 minutes PRN  nicotine  Polacrilex Gum 4 milliGRAM(s) Oral four times a day PRN  ondansetron Injectable 4 milliGRAM(s) IV Push every 8 hours PRN  oxyCODONE    IR 5 milliGRAM(s) Oral every 4 hours PRN      LABS:                        10.7   5.86  )-----------( 276      ( 04 Feb 2025 06:59 )             35.2     Hgb Trend: 10.7<--, 10.7<--, 10.4<--, 9.2<--  02-04    135  |  94[L]  |  36[H]  ----------------------------<  99  4.3   |  32[H]  |  1.15    Ca    8.7      04 Feb 2025 06:59      Creatinine Trend: 1.15<--, 0.88<--, 0.84<--, 1.08<--, 0.93<--    Urinalysis Basic - ( 04 Feb 2025 06:59 )    Color: x / Appearance: x / SG: x / pH: x  Gluc: 99 mg/dL / Ketone: x  / Bili: x / Urobili: x   Blood: x / Protein: x / Nitrite: x   Leuk Esterase: x / RBC: x / WBC x   Sq Epi: x / Non Sq Epi: x / Bacteria: x            MICROBIOLOGY:     RADIOLOGY:  [ ] Reviewed and interpreted by me    PULMONARY FUNCTION TESTS:    EKG:

## 2025-02-04 NOTE — BH CONSULTATION LIAISON ASSESSMENT NOTE - NSBHCONSULTRECOMMENDOTHER_PSY_A_CORE FT
1) continue melatonin PRN for insomnia  2) pain control per primary team and pain consultants, appreciate recs  3) delirium precautions, sleep protection  4) *** 1) continue melatonin PRN for insomnia  2) pain control per primary team and pain consultants, appreciate recs  3) delirium precautions, sleep protection  4) start Seroquel 12.5mg qHS  5) unable to comment on capacity at this time, due to delirium and patient's fixation on pain

## 2025-02-04 NOTE — PROGRESS NOTE ADULT - SUBJECTIVE AND OBJECTIVE BOX
Patient is a 79y old  Female who presents with a chief complaint of Worsening R hip pain (04 Feb 2025 07:13)       INTERVAL HPI/OVERNIGHT EVENTS:  Patient seen and evaluated at bedside.  Pt is resting comfortable in NAD. Denies N/V/F/C.    Allergies    No Known Allergies    Intolerances        Vital Signs Last 24 Hrs  T(C): 36.5 (04 Feb 2025 05:22), Max: 37.3 (03 Feb 2025 23:50)  T(F): 97.7 (04 Feb 2025 05:22), Max: 99.2 (03 Feb 2025 23:50)  HR: 72 (04 Feb 2025 05:22) (72 - 78)  BP: 107/67 (04 Feb 2025 05:22) (11/70 - 133/74)  BP(mean): --  RR: 18 (04 Feb 2025 05:22) (18 - 18)  SpO2: 94% (04 Feb 2025 05:22) (92% - 94%)    Parameters below as of 04 Feb 2025 05:22  Patient On (Oxygen Delivery Method): nasal cannula  O2 Flow (L/min): 4      LABS:                        10.7   5.86  )-----------( 276      ( 04 Feb 2025 06:59 )             35.2     02-04    135  |  94[L]  |  36[H]  ----------------------------<  99  4.3   |  32[H]  |  1.15    Ca    8.7      04 Feb 2025 06:59        Urinalysis Basic - ( 04 Feb 2025 06:59 )    Color: x / Appearance: x / SG: x / pH: x  Gluc: 99 mg/dL / Ketone: x  / Bili: x / Urobili: x   Blood: x / Protein: x / Nitrite: x   Leuk Esterase: x / RBC: x / WBC x   Sq Epi: x / Non Sq Epi: x / Bacteria: x      CAPILLARY BLOOD GLUCOSE          Lower Extremity Physical Exam:  Vasular: DP/PT 0/4  B/L, CFT < 3 seconds B/L, Temperature gradient warm to warm R, warm to cool L    Neuro: Epicritic sensation intact to the level of the ankle, B/L.  Musculoskeletal/Ortho: s/p R USAMA & revision 9/2022  Skin: R lateral malleolus wound to level of bone, fibrogranular wound bed, indurated borders, well circumscribed, no malodor or purulence. L heel wound to the level of subq, mild sanguinous drainage, no acute signs of infection.     RADIOLOGY & ADDITIONAL TESTS:

## 2025-02-04 NOTE — PROGRESS NOTE ADULT - ATTENDING COMMENTS
Seen at bedside. F/u ID recs for right ankle OM.  Offload left heel and right ankle.  Local wound care for left heel.  Wound care orders placed.  Please reconsult if needed.

## 2025-02-04 NOTE — BH CONSULTATION LIAISON ASSESSMENT NOTE - HPI (INCLUDE ILLNESS QUALITY, SEVERITY, DURATION, TIMING, CONTEXT, MODIFYING FACTORS, ASSOCIATED SIGNS AND SYMPTOMS)
79y F with Hx COPD, HFpEF, HTN, CAD, PAD, RLE DVT (2023), who presented on 1/27 for R hip pain. Of note, she has history of R hip replacement, c/b infection in 6/2024; refused girdlestone resection arthroplasty at that time. She had presented to [facility] 3 days prior to coming to [facility]; per chart review, she had difficulty recalling specifics of treatment while there. She was complaining of inability to ambulate and was admitted to [facility] for pain control, orthopedics evaluation, and further workup. US negative for DVT. CXR showing clear lungs. Found to have chronic ankle wound, likely osteomyelitis. ID following case, patient on Cefepime/Bactrim. Will need long-term antibiotics. Patient has admitted to being non-compliant w/ home medications. She was again offered girdlestone resection arthroplasty during current admission, but again refused. PT recommended [facility] for continued therapy and wound care. Per hospitalist note, patient has been refusing rehab. Per primary RN, she has also refused SCDs, telemetry monitor, and DuoNeb.    Went to bedside to assess patient. Able to provide own history. She reports history of anxiety, for which she takes Ativan at home and finds it helpful. Never seen psychiatrist or psychotherapist. No SI/HI/SA. No hallucinations. Describes mood as "sad." She lives w/ her , who is incapacitated due to stroke and requires HHA. Patient is normally independent in ADLs and drives a car. Previously worked as . Has son and daughter (LELAND Elkins), but hesitant to ask them (or anyone else) for assistance at home. Throughout conversation, she often repeated self (ex. asking the day of the week, purpose of SCDs, etc.) Even after examiner explained the purpose of the SCD, she could not accurately recall the explanation. She is reluctant to go to rehab, claiming she is worried about her assets; states that last time returned home from rehab, found her clothes to be missing. 79y F with Hx COPD, HFpEF, HTN, CAD, PAD, RLE DVT (2023), who presented on 1/27 for R hip pain. Of note, she has history of R hip replacement, c/b infection in 6/2024; refused girdlestone resection arthroplasty at that time. She had presented to [facility] 3 days prior to coming to [facility]; per chart review, she had difficulty recalling specifics of treatment while there. She was complaining of inability to ambulate and was admitted to [facility] for pain control, orthopedics evaluation, and further workup. US negative for DVT. CXR showing clear lungs. Found to have chronic ankle wound, likely osteomyelitis. ID following case, patient on Cefepime/Bactrim. Will need long-term antibiotics. Patient has admitted to being non-compliant w/ home medications. She was again offered girdlestone resection arthroplasty during current admission, but again refused. PT recommended [facility] for continued therapy and wound care. Per hospitalist note, patient has been refusing rehab. Per primary RN, she has also refused SCDs, telemetry monitor, and DuoNeb.    Went to bedside to assess patient. Able to provide own history. She reports history of anxiety, for which she takes Ativan at home and finds it helpful; however, per [facility], only prescribed opiates. Never seen psychiatrist or psychotherapist. No SI/HI/SA. No hallucinations. Describes mood as "sad." She lives w/ her , who is incapacitated due to stroke and requires HHA. Patient is normally independent in ADLs and drives a car. Previously worked as . Has son and daughter (LELAND Elkins), but hesitant to ask them (or anyone else) for assistance at home. Throughout conversation, she often repeated self (ex. asking the day of the week, purpose of SCDs, etc.) Even after examiner explained the purpose of the SCD, she could not accurately recall the explanation. She is reluctant to go to rehab, claiming she is worried about her assets; states that last time returned home from rehab, found her clothes to be missing. She complains of burning pain in her feet.

## 2025-02-04 NOTE — BH CONSULTATION LIAISON ASSESSMENT NOTE - NSBHCHARTREVIEWINVESTIGATE_PSY_A_CORE FT
MR R ankle w/ w/o contrast 1/29/25:  Lateral soft tissue ankle wound with osteitis of the adjacent fibula.   This area is susceptible to developing osteomyelitis.  No fluid collection.    EKG with QTc 435

## 2025-02-04 NOTE — BH CONSULTATION LIAISON ASSESSMENT NOTE - INTERRUPTED ATTEMPT:
Patient is accepted at [facility]  Patient is accepted by Dr Val Carrillo per Michelle Miller  Transportation is arranged with TBD  Transportation is scheduled for TBD  Patient may go to the floor at anytime  Nurse report is to be called to [phone] prior to patient transfer  [facility]  [address] [address]  [address] [place] [address] [phone]  Tax ID -[id number]  Concurrent reviewer: Marcia Bey ; [phone] None known

## 2025-02-04 NOTE — BH CONSULTATION LIAISON ASSESSMENT NOTE - NSBHCHARTREVIEWVS_PSY_A_CORE FT
Vital Signs Last 24 Hrs  T(C): 36.8 (04 Feb 2025 12:09), Max: 37.3 (03 Feb 2025 23:50)  T(F): 98.2 (04 Feb 2025 12:09), Max: 99.2 (03 Feb 2025 23:50)  HR: 77 (04 Feb 2025 12:09) (72 - 84)  BP: 133/61 (04 Feb 2025 12:09) (11/70 - 133/74)  BP(mean): --  RR: 18 (04 Feb 2025 12:09) (18 - 18)  SpO2: 92% (04 Feb 2025 12:09) (88% - 94%)    Parameters below as of 04 Feb 2025 12:09  Patient On (Oxygen Delivery Method): nasal cannula  O2 Flow (L/min): 4

## 2025-02-04 NOTE — PROGRESS NOTE ADULT - ASSESSMENT
_________________________________________________________________________________________  ========>>  M E D I C A L   A T T E N D I N G    F O L L O W  U P  N O T E  <<=========  -----------------------------------------------------------------------------------------------------    - Patient seen and examined by me earlier today.   - Patient today overall the same     patient states does not want to go to rehabilitation but educated need for sport care and help with medications / PT, wound... encouraged to agree to rehabilitation..   patient refusing O2 monitor despite report of desaturation before.. patient was earlier bargaining with staff for morphine IV in order to agree with O2 monitoring !!       psych and Pulm to evaluate as well    ==================>> REVIEW OF SYSTEM <<=================    GEN: no fever, no chills, pain in right hip area.. asking for more pain medications and IV despite multiple discussion including side effects in patient with compromised resp status..   RESP: no SOB, no cough, no sputum  CVS: no chest pain, no palpitations  GI: no abdominal pain, no nausea  : no dysuria, no frequency  Neuro: no headache, no dizziness    ==================>> PHYSICAL EXAM <<=================    GEN: A&O X 3 , NAD , comfortable, as above .. in bed .. encouraged out of bed to chair with assistance as needed.   HEENT: NCAT, PERRL, MMM, hearing intact  CVS: S1S2 , regular , No M/R/G appreciated  PULM: scattered wheez >> decreased / improved   ABD.: soft. non tender, non distended,  Extrem: intact pulses , leg edema decreased : stasis changes , wounds dressed        ( Note written / Date of service 02-04-25 ( This is certified to be the same as "ENTERED" date above ( for billing purposes)))    ==================>> MEDICATIONS <<====================    albuterol/ipratropium for Nebulization 3 milliLiter(s) Nebulizer every 6 hours  atorvastatin 40 milliGRAM(s) Oral at bedtime  cefepime   IVPB 1000 milliGRAM(s) IV Intermittent every 8 hours  chlorhexidine 2% Cloths 1 Application(s) Topical daily  cloNIDine 0.1 milliGRAM(s) Oral every 12 hours  collagenase Ointment 1 Application(s) Topical daily  fluticasone propionate/ salmeterol 250-50 MICROgram(s) Diskus 1 Dose(s) Inhalation two times a day  furosemide    Tablet 20 milliGRAM(s) Oral daily  gabapentin 400 milliGRAM(s) Oral two times a day  morphine ER Tablet 15 milliGRAM(s) Oral every 12 hours  polyethylene glycol 3350 17 Gram(s) Oral daily  rivaroxaban 2.5 milliGRAM(s) Oral two times a day  senna 2 Tablet(s) Oral at bedtime  thiamine 100 milliGRAM(s) Oral daily  tiotropium 2.5 MICROgram(s) Inhaler 2 Puff(s) Inhalation daily  trimethoprim  160 mG/sulfamethoxazole 800 mG 2 Tablet(s) Oral two times a day    MEDICATIONS  (PRN):  acetaminophen     Tablet .. 650 milliGRAM(s) Oral every 6 hours PRN Temp greater or equal to 38C (100.4F), Mild Pain (1 - 3)  melatonin 3 milliGRAM(s) Oral at bedtime PRN Insomnia  naloxone Injectable 0.2 milliGRAM(s) IV Push every 3 minutes PRN respiratory depression/ suspected opioid OD  nicotine  Polacrilex Gum 4 milliGRAM(s) Oral four times a day PRN Smoking Cessation  ondansetron Injectable 4 milliGRAM(s) IV Push every 8 hours PRN Nausea and/or Vomiting  oxyCODONE    IR 5 milliGRAM(s) Oral every 4 hours PRN Severe Pain (7 - 10)    ___________  Active diet:  Diet, DASH/TLC:   Sodium & Cholesterol Restricted  ___________________    ==================>> VITAL SIGNS <<==================    Vital Signs Last 24 HrsT(C): 36.8 (02-04-25 @ 12:09)  T(F): 98.2 (02-04-25 @ 12:09), Max: 99.2 (02-03-25 @ 23:50)  HR: 77 (02-04-25 @ 12:09) (72 - 84)  BP: 133/61 (02-04-25 @ 12:09)  RR: 18 (02-04-25 @ 12:09) (18 - 18)  SpO2: 92% (02-04-25 @ 12:09) (88% - 94%)       ==================>> LAB AND IMAGING <<==================                        10.7   5.86  )-----------( 276      ( 04 Feb 2025 06:59 )             35.2        02-04    135  |  94[L]  |  36[H]  ----------------------------<  99  4.3   |  32[H]  |  1.15    Ca    8.7      04 Feb 2025 06:59    WBC count:   5.86 <<== ,  4.43 <<== ,  4.70 <<==   Hemoglobin:   10.7 <<==,  10.7 <<==,  10.4 <<==  platelets:  276 <==, 260 <==, 214 <==, 199 <==    Creatinine:  1.15  <<==, 0.88  <<==, 0.84  <<==  Sodium:   135  <==, 142  <==, 137  <==       AST:          14(01-30) <== , 15(01-27) <== , 24(01-27) <==      ALT:        8(01-30)  <== , 11(01-27)  <== , 15(01-27)  <==      AP:        80(01-30)  <=, 109(01-27)  <=, 121(01-27)  <=     Bili:        0.4(01-30)  <=, 0.3(01-27)  <=, 0.3(01-27)  <=    ____________________________    M I C R O B I O L O G Y :    Culture - Wound Aerobic/Anaerobic (collected 29 Jan 2025 07:19)  Source: Skin/Wound  Final Report (03 Feb 2025 07:50):    Numerous Pseudomonas aeruginosa    Rare Methicillin Resistant Staphylococcus aureus    Rare Stenotrophomonas maltophilia  Organism: Pseudomonas aeruginosa  Methicillin resistant Staphylococcus aureus  Stenotrophomonas maltophilia (03 Feb 2025 07:50)  Organism: Stenotrophomonas maltophilia (03 Feb 2025 07:50)    Sensitivities:      Method Type: KB      -  Minocycline: S  Organism: Stenotrophomonas maltophilia (03 Feb 2025 07:50)    Sensitivities:      Method Type: DEVAN      -  Levofloxacin: S 1      -  Trimethoprim/Sulfamethoxazole: S <=0.5/9.5  Organism: Methicillin resistant Staphylococcus aureus (03 Feb 2025 07:50)    Sensitivities:      Method Type: DEVAN      -  Clindamycin: S <=0.25      -  Daptomycin: S 1      -  Erythromycin: R >4      -  Gentamicin: S <=4 Should not be used as monotherapy      -  Linezolid: S 2      -  Oxacillin: R >2      -  Penicillin: R >2      -  Rifampin: S <=1 Should not be used as monotherapy      -  Tetracycline: I 8      -  Trimethoprim/Sulfamethoxazole: S <=0.5/9.5      -  Vancomycin: S 1  Organism: Pseudomonas aeruginosa (03 Feb 2025 07:50)    Sensitivities:      Method Type: DEVAN      -  Amikacin: S <=16      -  Aztreonam: S <=4      -  Cefepime: S <=2      -  Ceftazidime: S 4      -  Ciprofloxacin: S <=0.25      -  Imipenem: S <=1      -  Levofloxacin: S <=0.5      -  Meropenem: S <=1      -  Piperacillin/Tazobactam: S <=8    Culture - Wound Aerobic/Anaerobic (collected 29 Jan 2025 07:19)  Source: Skin/Wound  Final Report (03 Feb 2025 13:06):    Moderate Methicillin Resistant Staphylococcus aureus    Rare Stenotrophomonas maltophilia See previous culture [id number]    for susceptibility  Organism: Methicillin resistant Staphylococcus aureus (03 Feb 2025 13:06)  Organism: Methicillin resistant Staphylococcus aureus (03 Feb 2025 13:06)    Sensitivities:      Method Type: DEVAN      -  Clindamycin: S <=0.25      -  Daptomycin: S 1      -  Erythromycin: R >4      -  Gentamicin: S <=4 Should not be used as monotherapy      -  Linezolid: S 2      -  Oxacillin: R >2      -  Penicillin: R >2      -  Rifampin: S <=1 Should not be used as monotherapy      -  Tetracycline: I 8      -  Trimethoprim/Sulfamethoxazole: S <=0.5/9.5      -  Vancomycin: S 1    Culture - Blood (collected 27 Jan 2025 22:17)  Source: .Blood BLOOD  Final Report (02 Feb 2025 04:01):    No growth at 5 days    Culture - Blood (collected 27 Jan 2025 22:10)  Source: .Blood BLOOD  Final Report (02 Feb 2025 04:01):    No growth at 5 days          < from: TTE W or WO Ultrasound Enhancing Agent (01.28.25 @ 13:55) >  CONCLUSIONS:    1. Left ventricular cavity is small. Left ventricular wall thickness is normal. Left ventricular systolic function is normal with an ejection fraction of 73 % by Rios's method of disks.   2. Mildly enlarged right ventricular cavity size, with normal wall thickness, and normal right ventricular systolic function.   3. Moderate to severe aortic stenosis.   4. Moderate tricuspid regurgitation.   5. Estimated pulmonary artery systolic pressure is 75 mmHg, consistent with severe pulmonary hypertension.   6. No pericardial effusion seen.   7. Compared to the transthoracic echocardiogram performed on 4/12/2023, there have been no significant interval changes.  < end of copied text >    < from: CT Angio Chest PE Protocol w/ IV Cont (01.29.25 @ 08:47) >  IMPRESSION:  No pulmonary embolism.  Right upper lobe groundglass opacities and right lower lobe nodular   opacities are likely infectious/inflammatory.  Small bilateral pleural effusions.  < end of copied text >    < from: [facility] Duplex Lower Ext Vein Scan, Bilat (01.27.25 @ 20:06) >  IMPRESSION:  No evidence of deep venous thrombosis in either lower extremity.  < end of copied text >    < from: Xray Knee 3 Views, Right (01.27.25 @ 23:18) >  IMPRESSION:  1.  Right longstem femoral revision hardware with lucency surrounding the   hardware and marked subsidence in keeping with loosening.  2.  Lucency at the lateral cortex of the proximal femur may reflect   postoperative change versus osteolysis.  3.  Pseudoarticulation between the proximal femur and right iliac bone  < end of copied text >    < from: VA Duplex Lower Ext Vein Scan, Bilat (01.27.25 @ 20:06) >  IMPRESSION:  No evidence of deep venous thrombosis in either lower extremity.  < end of copied text >    < from: TTE W or WO Ultrasound Enhancing Agent (06.17.24 @ 17:12) >  CONCLUSIONS:    1. Left ventricular cavity is normal in size. Left ventricular wall thickness is normal. Left ventricular systolic function is normal with an ejection fraction of 64 % by Rios's method of disks. There are no regional wall motion abnormalities seen.   2. There is mild (grade 1) left ventricular diastolic dysfunction.   3. Normal right ventricular cavity size and normal systolic function.   4. Structurally normal mitral valve with normal leaflet excursion. There is calcification of the mitral valve annulus. There is mild mitral regurgitation.   5. The aortic valve anatomy cannot be determined with reduced systolic excursion. There is calcification of the aortic valve leaflets. There is severe aortic stenosis. The peak transaortic velocity is 3.94 m/s, peak transaortic gradient is 62.1 mmHg and mean transaortic gradient is 32.0 mmHg with an LVOT/aortic valve VTI ratio of 0.22. The aorticvalve area is estimated at 0.51 cm² by the continuity equation. There is mild to moderate aortic regurgitation.   6. The left atrium is mildly dilated with an indexed volume of 41 ml/m².   7. No prior echocardiogram is available for comparison.  < end of copied text >    ___________________________________________________________________________________  ===============>>  A S S E S S M E N T   A N D   P L A N <<===============  ------------------------------------------------------------------------------------------    79F w/ hx of ETOH in past, PAD w/ b/l carotid artery stenosis, HFpEF EF 64% w/ severe AS, COPD, HTN, CAD, RLE DVT 8/2023 w/ hx of R hip infection p/w worsening R hip pain     Problem/Plan - 1:  ·  Problem: right hip pain.   ·  Plan: Patient endorsing severe R hip pain w/o inciting fall or traumatic event. 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 06/2024 admission with possible joint infection. No clear signs of infection currently and exam is equivocal. -Pain control as ordered [noting there is some substance use history]     >> pain management follow up and further management            Bowel regimen as needed   -cultures as above   - Ortho following , noted   - antibiotics  per ID: ID follow up > duration of therapy..     ## additional osteomyelitis of ankle     podiatry following, appreciated    ID following for antibiotics Rx >> will need long term antibiotics Rx ( patient at this time not interested in going to rehabilitation but unclear if she can take of administration of antibiotics at home ! )     otherwise as above    local wound care     Problem/Plan - 2:  ·  Problem: acute exacerbation of Chronic heart failure with preserved ejection fraction (HFpEF), edema, elevated BNP     in patient with severe Aortic stenosis as above   repeat TTE as above with AS, MR, PAH  diuretics per cardio ( patient declined some doses before)   -Daily weight and I/O  cardio following   Xarelto 2.5 mg BID per cardio    CTA and Duplex negative for VTE      Problem/Plan - 3:  ·  Problem: COPD   ? has 02 PRN at home. Patient smoking tobacco, not clarifying how much.  -Duonebs Q6hrs  -Cont. Symbicort BID  -Cont. Spiriva  - pulm evaluation for optimization given episode of hypoxemia    ## pneumonia on CT as above >> post antibiotics >> monitor     lung sounds and breathing better     ID on board      Problem/Plan - 4:  ·  Problem: CAD (coronary artery disease).   ·  Plan: Patient states she does not take aspirin or atorvastatin at home.  cardio following, appreciated     Problem/Plan - 5:  ·  Problem: EtOH dependence.   no signs of withdrawal      Problem/Plan - 6:  ·  Problem: history of Severe aortic stenosis.   repeat echo as above : unchanged   cardio appreciated     Problem/Plan - 7:  ·  Problem: Essential hypertension.   ·  Plan: -Trend BP  -Cont. Clonidine BID  -Patient does not recall taking Nifedipine, can resume prior dose if BPs uncontrolled.     Problem/Plan - 8:  ·  Problem: Prophylactic measure.   ·  Plan: DVT PPx  xarelto     --------------------------------------------  Case discussed with patient, med team..   Education given on findings and plan of care  ___________________________  MARIZOL Perez D.O.  Pager: [phone]

## 2025-02-04 NOTE — CONSULT NOTE ADULT - ASSESSMENT
79F w/ hx of ETOH in past, PAD w/ b/l carotid artery stenosis, HFpEF EF 64% w/ severe AS, COPD, HTN, CAD, RLE DVT 8/2023 w/ hx of R hip infection p/w worsening R hip pain. Found to have OM, infected hardware, with multiple orgnaisms. Over her stay her Oxygen and dyspnea have somewhat worsened.     #Acute onchornic hypoxic respiratory failure: progression of PNA  #COPD     Reccs:  Continue with Advair, and duonebs q6  - add 3% and airway clearence device (Aerobika or similar)  - Likely not in need of abx expansion as i suspect her current abx have been suppressing her PNA and she just needs airway clearance can expand if develops sepsis.   - GOC disucssed with patient and she deffers to her kids. explained to her and the siblings that patiens in her situation are likely to have a continual decline due to chronic infection and immobility as wells as her underlying cardiopulmonary disease and that while I fully expect her to recover from this her longer term prognosis is quite poor. They will discuss it together.

## 2025-02-04 NOTE — CONSULT NOTE ADULT - REASON FOR ADMISSION
Worsening R hip pain

## 2025-02-04 NOTE — PROGRESS NOTE ADULT - ASSESSMENT
79F w/ hx of ETOH in past, PAD w/ b/l carotid artery stenosis, HFpEF EF 64% w/ severe AS, COPD, HTN, CAD, RLE DVT 8/2023 w/ hx of R hip infection p/w worsening R hip pain. Patient is poor historian, not answering all questions she is being asked, continued requests for pain medication. Patient endorses being admitted to [facility] for multiple days for hip pain. States she did not receive antibiotics. Cannot specify nature of her admission. States she fell in front of [place] before [place] admission but not since. States she was able to bear weight after leaving hospital around 3 days ago but pain has progressively worsened and does not think she can ambulate now. Denies fevers, chills or additional trauma. States she does not remember her medications and is not compliant with many of them outside of hypertension.     Workup:   -MRI R ankle: Lateral soft tissue ankle wound with osteitis of the adjacent fibula. This area is susceptible to developing osteomyelitis. No fluid collection.  -Xray R hip: Right longstem femoral revision hardware with lucency surrounding the hardware and marked subsidence in keeping with loosening.Lucency at the lateral cortex of the proximal femur may reflect postoperative change versus osteolysis.Pseudoarticulation between the proximal femur and right iliac bone  -CT angio chest: No pulmonary embolism. Right upper lobe groundglass opacities and right lower lobe nodular opacities are likely infectious/inflammatory. Small bilateral pleural effusions.  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

## 2025-02-04 NOTE — CHART NOTE - NSCHARTNOTEFT_GEN_A_CORE
Notified by RN, patient with decreased O2 sat to 85-86% on 4L NC with associated complaint of SOB, oxygen increased to 5L NC with improvement to 88-89%, duoneb administered. Patient seen and examined, patient sitting up in bed, appears comfortable in no respiratory distress. Patient endorsing want for IV morphine for uncontrolled RT hip / b/l foot pain. Educated patient on concern for worsening respiratory status and increased oxygen requirement today, patient with history of refusing care this admission including tele/cont pulse ox monitoring. Patient continuing to refuse continuous pulse oximetry monitoring due to "the box is heavy I hate it". Expressed importance of continuously monitoring respiratory status and inability to do so could result in worsening clinical condition up to and including sudden death. Offered methods to improve discomfort of telemetry monitoring device but patient continues to refuse despite attempt at accomodation. On exam, A&O x 3, b/l feet warm to touch, sensation intact. Doppler with audible DP pulses bilaterally, unable to find PT pulses. During assessment of feet no discomfort noted. Patient appears comfortable when engaged in conversation. Patient provided permission to discuss care with Daughter Brittni and Son Tyron Ulloa. No answer, voicemail left for both respectively, awaiting call back. Discussed with Attending Dr. Perez, plan as below.     > CXR. Pulmonary consulted.   > Attending advised to continue current chronic pain recommendations. No IV opioids due to concern for respiratory depression and inability to have close monitoring of respiratory status 2/2 patient refusal.   > Attending advised to hold on dedicated vascular imaging at this time.   > Concern for poor insight into medical implications of refusal. Psych consulted for capacity assessment.   > Continue to monitor closely. Reinforce care plan and continue to educate for compliance. RN aware.     Charlotte Singer PA-C     Addendum #1: Patient status and events discussed with Daughter Brittni who advised own concern for patient's judgement in regards to medical decision making. Brittni agreeable with psychiatric assessment for capacity and available for collateral as needed, psych made aware. Encouraged daughter to reinforce care plan with patient to assist with compliance due to limitations with ability to provide care 2/2 patient's refusal. Daughter expressed that she agrees with care plan, all questions and concerns addressed. On patient re-assessment, patient in no acute distress. Vitals stable. Patient status and events to be signed out to [facility] for continued management and care.

## 2025-02-04 NOTE — BH CONSULTATION LIAISON ASSESSMENT NOTE - NSBHATTESTCOMMENTATTENDFT_PSY_A_CORE
This is a 79-y.o. CF patient, with Hx COPD, HTN, PAD, R hip replacement (c/b infection 6/2024), HFpEF, who presented for inability to ambulate. Found to have infected ankle wound, on antibiotics. Recommended to pursue orthopedic surgery and MADELEINE, however patient is refusing both (as well as other aspects of care, such as SCDs and telemetry). She complains of pain, anxiety, and poor sleep. Consult requested to evaluate patient for assessment of decisional capacity.    I have seen and evaluated this patient myself. Chart, labs, meds reviewed. I agree with resident's assessment and plan. At this time, patient likely lacks decisional capacity to refuse critical care. Please address the issue of pain.

## 2025-02-04 NOTE — CHART NOTE - NSCHARTNOTEFT_GEN_A_CORE
: Tomeka    INDICATION: hypoxia    PROCEDURE:  [ x] LIMITED ECHO  [ x] LIMITED CHEST  [ ] LIMITED RETROPERITONEAL  [ ] LIMITED ABDOMINAL  [ ] LIMITED DVT  [ ] NEEDLE GUIDANCE VASCULAR  [ ] NEEDLE GUIDANCE THORACENTESIS  [ ] NEEDLE GUIDANCE PARACENTESIS  [ ] NEEDLE GUIDANCE PERICARDIOCENTESIS  [ ] OTHER    FINDINGS:  Chest: Patchy Blines on overall elzbieta pattern anterior R>L.    Dense Right consolidation with dynamic air bronchograms is translobar.   Small Left lower lobe consolidation with dynamic air bronchorams    Echo: Normal LVF, RV fpoorly visualized but function grossly intact,  Dilated LA and RA, some suggestion of diatolic/valvular disease IVC is small and collpsable  INTERPRETATION: Multifocal PNA R>L  Hypoxia is likely secondary to PNA rather than volume overload. Other Normal muscle tone/strength

## 2025-02-04 NOTE — BH CONSULTATION LIAISON ASSESSMENT NOTE - CURRENT MEDICATION
MEDICATIONS  (STANDING):  albuterol/ipratropium for Nebulization 3 milliLiter(s) Nebulizer every 6 hours  atorvastatin 40 milliGRAM(s) Oral at bedtime  cefepime   IVPB 1000 milliGRAM(s) IV Intermittent every 8 hours  chlorhexidine 2% Cloths 1 Application(s) Topical daily  cloNIDine 0.1 milliGRAM(s) Oral every 12 hours  collagenase Ointment 1 Application(s) Topical daily  fluticasone propionate/ salmeterol 250-50 MICROgram(s) Diskus 1 Dose(s) Inhalation two times a day  furosemide    Tablet 20 milliGRAM(s) Oral daily  gabapentin 400 milliGRAM(s) Oral two times a day  morphine ER Tablet 15 milliGRAM(s) Oral every 12 hours  polyethylene glycol 3350 17 Gram(s) Oral daily  rivaroxaban 2.5 milliGRAM(s) Oral two times a day  senna 2 Tablet(s) Oral at bedtime  thiamine 100 milliGRAM(s) Oral daily  tiotropium 2.5 MICROgram(s) Inhaler 2 Puff(s) Inhalation daily  trimethoprim  160 mG/sulfamethoxazole 800 mG 2 Tablet(s) Oral two times a day    MEDICATIONS  (PRN):  acetaminophen     Tablet .. 650 milliGRAM(s) Oral every 6 hours PRN Temp greater or equal to 38C (100.4F), Mild Pain (1 - 3)  melatonin 3 milliGRAM(s) Oral at bedtime PRN Insomnia  naloxone Injectable 0.2 milliGRAM(s) IV Push every 3 minutes PRN respiratory depression/ suspected opioid OD  nicotine  Polacrilex Gum 4 milliGRAM(s) Oral four times a day PRN Smoking Cessation  ondansetron Injectable 4 milliGRAM(s) IV Push every 8 hours PRN Nausea and/or Vomiting  oxyCODONE    IR 5 milliGRAM(s) Oral every 4 hours PRN Severe Pain (7 - 10)

## 2025-02-04 NOTE — PROGRESS NOTE ADULT - SUBJECTIVE AND OBJECTIVE BOX
Follow Up:      Interval History/ROS:Patient is a 79y old Female who presents with a chief complaint of Worsening R hip pain.  Seen at bedside, asking for stronger pain meds and IV morphine.       Allergies  No Known Allergies    ANTIMICROBIALS:    cefepime   IVPB 1000 every 8 hours  trimethoprim  160 mG/sulfamethoxazole 800 mG 2 two times a day    OTHER MEDS: MEDICATIONS  (STANDING):  acetaminophen     Tablet .. 650 every 6 hours PRN  albuterol/ipratropium for Nebulization 3 every 6 hours  atorvastatin 40 at bedtime  cloNIDine 0.1 every 12 hours  fluticasone propionate/ salmeterol 250-50 MICROgram(s) Diskus 1 two times a day  furosemide    Tablet 20 daily  gabapentin 400 two times a day  melatonin 3 at bedtime PRN  morphine ER Tablet 15 every 12 hours  ondansetron Injectable 4 every 8 hours PRN  oxyCODONE    IR 5 every 4 hours PRN  polyethylene glycol 3350 17 daily  rivaroxaban 2.5 two times a day  senna 2 at bedtime  tiotropium 2.5 MICROgram(s) Inhaler 2 daily    Vital Signs Last 24 Hrs  T(F): 98.2 (02-04-25 @ 12:09), Max: 99.2 (02-03-25 @ 23:50)    Vital Signs Last 24 Hrs  HR: 77 (02-04-25 @ 12:09) (72 - 84)  BP: 133/61 (02-04-25 @ 12:09) (11/70 - 133/74)  RR: 18 (02-04-25 @ 12:09)  SpO2: 92% (02-04-25 @ 12:09) (88% - 94%)  Wt(kg): --    EXAM:    GA: NAD  CV: nl S1/S2  Lungs: CTAB, on 4 L NC   Abd: soft, nontender  Ext: no edema  IV: no phlebitis    Labs:                        10.7   5.86  )-----------( 276      ( 04 Feb 2025 06:59 )             35.2     02-04    135  |  94[L]  |  36[H]  ----------------------------<  99  4.3   |  32[H]  |  1.15    Ca    8.7      04 Feb 2025 06:59        WBC Trend:  WBC Count: 5.86 (02-04-25 @ 06:59)  WBC Count: 4.43 (02-02-25 @ 07:13)  WBC Count: 4.70 (01-31-25 @ 06:54)      Creatine Trend:  Creatinine: 1.15 (02-04)  Creatinine: 0.88 (02-02)  Creatinine: 0.84 (01-30)      Liver Biochemical Testing Trend:  Alanine Aminotransferase (ALT/SGPT): 8 *L* (01-30)  Alanine Aminotransferase (ALT/SGPT): 11 (01-27)  Alanine Aminotransferase (ALT/SGPT): 15 (01-27)  Alanine Aminotransferase (ALT/SGPT): 7 (06-20)  Alanine Aminotransferase (ALT/SGPT): 10 (06-19)  Aspartate Aminotransferase (AST/SGOT): 14 (01-30-25 @ 07:13)  Aspartate Aminotransferase (AST/SGOT): 15 (01-27-25 @ 22:26)  Aspartate Aminotransferase (AST/SGOT): 24 (01-27-25 @ 16:35)  Aspartate Aminotransferase (AST/SGOT): 15 (06-20-24 @ 06:10)  Aspartate Aminotransferase (AST/SGOT): 12 (06-19-24 @ 07:09)  Bilirubin Total: 0.4 (01-30)  Bilirubin Total: 0.3 (01-27)  Bilirubin Total: 0.3 (01-27)  Bilirubin Total: 0.3 (06-20)  Bilirubin Total: 0.2 (06-19)      Trend LDH      Urinalysis Basic - ( 04 Feb 2025 06:59 )    Color: x / Appearance: x / SG: x / pH: x  Gluc: 99 mg/dL / Ketone: x  / Bili: x / Urobili: x   Blood: x / Protein: x / Nitrite: x   Leuk Esterase: x / RBC: x / WBC x   Sq Epi: x / Non Sq Epi: x / Bacteria: x        MICROBIOLOGY:  Vancomycin Level, Trough: 13.3 (02-02 @ 10:21)    MRSA PCR Result.: NotDetec (06-12-24 @ 14:54)      Culture - Blood (collected 27 Jan 2025 22:17)  Source: .Blood BLOOD  Final Report:    No growth at 5 days    Culture - Blood (collected 27 Jan 2025 22:10)  Source: .Blood BLOOD  Final Report:    No growth at 5 days    Culture - Fungal, Body Fluid (collected 11 Jun 2024 19:13)  Source: .Body Fluid RIGHT HIP ASPIRATION  Final Report:    No fungus isolated at 4 weeks.    Culture - Blood (collected 11 Jun 2024 06:44)  Source: .Blood Blood-Peripheral  Final Report:    No growth at 5 days    Culture - Blood (collected 11 Jun 2024 06:35)  Source: .Blood Blood-Peripheral  Final Report:    No growth at 5 days    Culture - Urine (collected 11 Jun 2024 01:33)  Source: Clean Catch Clean Catch (Midstream)  Final Report:    >=3 organisms. Probable collection contamination.    Culture - Urine (collected 17 May 2023 00:30)  Source: Clean Catch Clean Catch (Midstream)  Final Report:    <10,000 CFU/mL Normal Urogenital Mone    Culture - Blood (collected 16 May 2023 23:30)  Source: .Blood Blood-Venous  Final Report:    No Growth Final    Culture - Blood (collected 16 May 2023 23:17)  Source: .Blood Blood-Peripheral  Final Report:    No Growth Final    Culture - Blood (collected 12 Apr 2023 11:02)  Source: .Blood Blood-Venous  Final Report:    No Growth Final                        Legionella Antigen, Urine: Negative (01-29 @ 12:56)              D-Dimer Assay, Quantitative: 1173 (01-29)            Sedimentation Rate, Erythrocyte: 89 mm/hr (01-27-25 @ 22:26)  Sedimentation Rate, Erythrocyte: 74 mm/hr (06-12-24 @ 07:15)  Sedimentation Rate, Erythrocyte: 25 mm/hr (06-10-24 @ 21:28)      RADIOLOGY:  imaging below personally reviewed    Xray Chest 1 View- PORTABLE-Urgent:  (04 Feb 2025 10:49)        < from: Xray Chest 1 View- PORTABLE-Urgent (Xray Chest 1 View- PORTABLE-Urgent .) (02.04.25 @ 10:49) >  IMPRESSION:  Right basilar hazy opacity, which may represent atelectasis and/or   layering pleural effusion.    --- End of Report ---    < end of copied text >

## 2025-02-04 NOTE — PROGRESS NOTE ADULT - SUBJECTIVE AND OBJECTIVE BOX
Cardiovascular Disease Progress Note    Overnight events: No acute events overnight.  no new cardiac sx  Otherwise review of systems negative    Objective Findings:  T(C): 36.5 (02-04-25 @ 05:22), Max: 37.3 (02-03-25 @ 23:50)  HR: 72 (02-04-25 @ 05:22) (72 - 78)  BP: 107/67 (02-04-25 @ 05:22) (11/70 - 133/74)  RR: 18 (02-04-25 @ 05:22) (18 - 18)  SpO2: 94% (02-04-25 @ 05:22) (92% - 94%)  Wt(kg): --  Daily     Daily       Physical Exam:  Gen: NAD  HEENT: EOMI  CV: RRR, normal S1 + S2, no m/r/g  Lungs: CTAB  Abd: soft, non-tender  Ext: No edema    Telemetry:    Laboratory Data:                        10.7   5.86  )-----------( 276      ( 04 Feb 2025 06:59 )             35.2     02-02    142  |  99  |  31[H]  ----------------------------<  127[H]  4.4   |  34[H]  |  0.88    Ca    8.8      02 Feb 2025 07:47  Phos  4.1     02-02  Mg     1.9     02-02                Inpatient Medications:  MEDICATIONS  (STANDING):  albuterol/ipratropium for Nebulization 3 milliLiter(s) Nebulizer every 6 hours  atorvastatin 40 milliGRAM(s) Oral at bedtime  cefepime   IVPB 1000 milliGRAM(s) IV Intermittent every 8 hours  chlorhexidine 2% Cloths 1 Application(s) Topical daily  cloNIDine 0.1 milliGRAM(s) Oral every 12 hours  collagenase Ointment 1 Application(s) Topical daily  fluticasone propionate/ salmeterol 250-50 MICROgram(s) Diskus 1 Dose(s) Inhalation two times a day  furosemide    Tablet 20 milliGRAM(s) Oral daily  gabapentin 400 milliGRAM(s) Oral two times a day  morphine ER Tablet 15 milliGRAM(s) Oral every 12 hours  polyethylene glycol 3350 17 Gram(s) Oral daily  rivaroxaban 2.5 milliGRAM(s) Oral two times a day  senna 2 Tablet(s) Oral at bedtime  thiamine 100 milliGRAM(s) Oral daily  tiotropium 2.5 MICROgram(s) Inhaler 2 Puff(s) Inhalation daily  trimethoprim  160 mG/sulfamethoxazole 800 mG 2 Tablet(s) Oral two times a day      Assessment:  79F w/ hx of ETOH in past, PAD w/ b/l carotid artery stenosis, HFpEF EF 64% w/ severe AS, COPD, HTN, CAD, RLE DVT 8/2023 w/ hx of R hip infection p/w worsening R hip pain and hypoxic resp failure    Recs:  cardiac stable  no e/o acs  cw antiplatelet, statin and antianginals for cad/stent. denies chest pain. hold off on ischemic eval for now   vol status stable =cw lasix to 20mg po qd. gentle diuresis as patient is preload dependent in setting of AS  s/p echo, results noted. mod to severe AS. doubt would be a TAVR candidate in setting of chronic hip infection and risk of endocarditis  tx for copd per primary team  cw xarelto 2.5mg bid for PAD dosing and "ppx for hx of VTE"   pain control  abx per ID  f/u ortho recs          Over 25 minutes spent on total encounter; more than 50% of the visit was spent counseling and/or coordinating care by the attending physician.      Chaka Lawrence MD   Cardiovascular Disease  [phone]

## 2025-02-04 NOTE — PROGRESS NOTE ADULT - ASSESSMENT
77F presents with R lateral malleolus wound to bone and L posterior heel wound to subQ  - Pt seen and evaluated  - Afebrile, No Leukocytosis   - R lateral malleolus wound to level of bone, fibrogranular wound bed, indurated borders, well circumscribed, no malodor or purulence. L heel wound to the level of subq, mild sanguinous drainage, no acute signs of infection.   - R ankle MR: early OM of lateral mal  - ID recs appreciated   - Ortho Recs, appreciated   - STRICT decubitus precautions, Z- FLOWS boots at all time when in bed and in chair resting.   - No acute podiatric surgical intervention, OM extends above podiatric surgical scope  - Patient is stable for discharge from podiatry standpoint pending medical stability/ final ID recs  - Wound care and follow up information can be found in discharge provider note for  - Please reconsult as needed  - Seen with  attending.

## 2025-02-05 LAB — CEFEPIME LEVEL RESULT: SIGNIFICANT CHANGE UP

## 2025-02-05 PROCEDURE — 99233 SBSQ HOSP IP/OBS HIGH 50: CPT

## 2025-02-05 PROCEDURE — 99232 SBSQ HOSP IP/OBS MODERATE 35: CPT

## 2025-02-05 PROCEDURE — G0545: CPT

## 2025-02-05 RX ORDER — ACETAMINOPHEN 500 MG/5ML
1000 LIQUID (ML) ORAL ONCE
Refills: 0 | Status: COMPLETED | OUTPATIENT
Start: 2025-02-05 | End: 2025-02-05

## 2025-02-05 RX ORDER — OXYCODONE HYDROCHLORIDE 30 MG/1
5 TABLET ORAL EVERY 4 HOURS
Refills: 0 | Status: DISCONTINUED | OUTPATIENT
Start: 2025-02-05 | End: 2025-02-12

## 2025-02-05 RX ADMIN — CEFEPIME 100 MILLIGRAM(S): 2 INJECTION, POWDER, FOR SOLUTION INTRAVENOUS at 16:22

## 2025-02-05 RX ADMIN — Medication 4 MILLILITER(S): at 11:56

## 2025-02-05 RX ADMIN — Medication 2 TABLET(S): at 06:51

## 2025-02-05 RX ADMIN — TIOTROPIUM BROMIDE INHALATION SPRAY 2 PUFF(S): 3.12 SPRAY, METERED RESPIRATORY (INHALATION) at 11:56

## 2025-02-05 RX ADMIN — OXYCODONE HYDROCHLORIDE 5 MILLIGRAM(S): 30 TABLET ORAL at 02:54

## 2025-02-05 RX ADMIN — Medication 1 APPLICATION(S): at 11:11

## 2025-02-05 RX ADMIN — CEFEPIME 100 MILLIGRAM(S): 2 INJECTION, POWDER, FOR SOLUTION INTRAVENOUS at 06:48

## 2025-02-05 RX ADMIN — Medication 15 MILLIGRAM(S): at 07:00

## 2025-02-05 RX ADMIN — Medication 0.1 MILLIGRAM(S): at 17:33

## 2025-02-05 RX ADMIN — OXYCODONE HYDROCHLORIDE 5 MILLIGRAM(S): 30 TABLET ORAL at 03:42

## 2025-02-05 RX ADMIN — Medication 15 MILLIGRAM(S): at 07:21

## 2025-02-05 RX ADMIN — Medication 0.1 MILLIGRAM(S): at 06:51

## 2025-02-05 RX ADMIN — Medication 400 MILLIGRAM(S): at 05:38

## 2025-02-05 RX ADMIN — CEFEPIME 100 MILLIGRAM(S): 2 INJECTION, POWDER, FOR SOLUTION INTRAVENOUS at 22:10

## 2025-02-05 RX ADMIN — OXYCODONE HYDROCHLORIDE 5 MILLIGRAM(S): 30 TABLET ORAL at 12:16

## 2025-02-05 RX ADMIN — GABAPENTIN 400 MILLIGRAM(S): 400 CAPSULE ORAL at 17:34

## 2025-02-05 RX ADMIN — GABAPENTIN 400 MILLIGRAM(S): 400 CAPSULE ORAL at 06:51

## 2025-02-05 RX ADMIN — OXYCODONE HYDROCHLORIDE 5 MILLIGRAM(S): 30 TABLET ORAL at 19:49

## 2025-02-05 RX ADMIN — Medication 1000 MILLIGRAM(S): at 06:05

## 2025-02-05 RX ADMIN — Medication 15 MILLIGRAM(S): at 17:33

## 2025-02-05 RX ADMIN — Medication 4 MILLILITER(S): at 19:05

## 2025-02-05 RX ADMIN — Medication 1 DOSE(S): at 06:50

## 2025-02-05 RX ADMIN — IPRATROPIUM BROMIDE AND ALBUTEROL SULFATE 3 MILLILITER(S): .5; 2.5 SOLUTION RESPIRATORY (INHALATION) at 06:52

## 2025-02-05 RX ADMIN — Medication 15 MILLIGRAM(S): at 06:51

## 2025-02-05 RX ADMIN — RIVAROXABAN 2.5 MILLIGRAM(S): 10 TABLET, FILM COATED ORAL at 17:33

## 2025-02-05 RX ADMIN — IPRATROPIUM BROMIDE AND ALBUTEROL SULFATE 3 MILLILITER(S): .5; 2.5 SOLUTION RESPIRATORY (INHALATION) at 17:34

## 2025-02-05 RX ADMIN — POLYETHYLENE GLYCOL 3350 17 GRAM(S): 17 POWDER, FOR SOLUTION ORAL at 11:56

## 2025-02-05 RX ADMIN — IPRATROPIUM BROMIDE AND ALBUTEROL SULFATE 3 MILLILITER(S): .5; 2.5 SOLUTION RESPIRATORY (INHALATION) at 11:56

## 2025-02-05 RX ADMIN — Medication 1 DOSE(S): at 19:05

## 2025-02-05 RX ADMIN — Medication 2 TABLET(S): at 19:05

## 2025-02-05 RX ADMIN — Medication 100 MILLIGRAM(S): at 11:56

## 2025-02-05 RX ADMIN — Medication 4 MILLILITER(S): at 06:50

## 2025-02-05 NOTE — PROGRESS NOTE ADULT - ASSESSMENT
79F w/ hx of ETOH in past, PAD w/ b/l carotid artery stenosis, HFpEF EF 64% w/ severe AS, COPD, HTN, CAD, RLE DVT 8/2023 w/ hx of R hip infection p/w worsening R hip pain. Patient is poor historian, not answering all questions she is being asked, continued requests for pain medication. Patient endorses being admitted to [facility] for multiple days for hip pain. States she did not receive antibiotics. Cannot specify nature of her admission. States she fell in front of [employer] before [place] admission but not since. States she was able to bear weight after leaving hospital around 3 days ago but pain has progressively worsened and does not think she can ambulate now. Denies fevers, chills or additional trauma. States she does not remember her medications and is not compliant with many of them outside of hypertension.     Workup:   -MRI R ankle: Lateral soft tissue ankle wound with osteitis of the adjacent fibula. This area is susceptible to developing osteomyelitis. No fluid collection.  -Xray R hip: Right longstem femoral revision hardware with lucency surrounding the hardware and marked subsidence in keeping with loosening.Lucency at the lateral cortex of the proximal femur may reflect postoperative change versus osteolysis.Pseudoarticulation between the proximal femur and right iliac bone  -CT angio chest: No pulmonary embolism. Right upper lobe groundglass opacities and right lower lobe nodular opacities are likely infectious/inflammatory. Small bilateral pleural effusions.  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

## 2025-02-05 NOTE — PROGRESS NOTE ADULT - ASSESSMENT
79F w/ hx of ETOH in past, PAD w/ b/l carotid artery stenosis, HFpEF EF 64% w/ severe AS, COPD, HTN, CAD, RLE DVT 8/2023 w/ hx of R hip infection p/w worsening R hip pain. Found to have OM, infected hardware, with multiple orgnaisms. Over her stay her Oxygen and dyspnea have somewhat worsened.     #Acute onchornic hypoxic respiratory failure: progression of PNA  #COPD   Today spent a while explaining the use of her devices and importance of airway clearance   Reccs:  - Rising Bicabr on BMP signifies either worsening respiratory acidosis with compensation or diureses induced alkalosis, would check vbg/abg   - Continue with Advair, and duonebs q6  - C/w 3% and airway clearence device (Vibrapep)  - Likely not in need of abx expansion as i suspect her current abx have been suppressing her PNA and she just needs airway clearance can expand if develops sepsis.   - GOC disucssed with patient and she deffers to her kids. explained to her and the siblings that patiens in her situation are likely to have a continual decline due to chronic infection and immobility as wells as her underlying cardiopulmonary disease and that while I fully expect her to recover from this her longer term prognosis is quite poor. They will discuss it together.

## 2025-02-05 NOTE — PROGRESS NOTE ADULT - SUBJECTIVE AND OBJECTIVE BOX
Follow Up:      Interval History/ROS:Patient is a 79y old  Female who presents with a chief complaint of Worsening R hip pain (05 Feb 2025 10:41)  Remains on 4 L NC, afebrile.     Allergies  No Known Allergies        ANTIMICROBIALS:    cefepime   IVPB 1000 every 8 hours  trimethoprim  160 mG/sulfamethoxazole 800 mG 2 two times a day        OTHER MEDS: MEDICATIONS  (STANDING):  acetaminophen     Tablet .. 650 every 6 hours PRN  albuterol/ipratropium for Nebulization 3 every 6 hours  atorvastatin 40 at bedtime  cloNIDine 0.1 every 12 hours  fluticasone propionate/ salmeterol 250-50 MICROgram(s) Diskus 1 two times a day  furosemide    Tablet 20 daily  gabapentin 400 two times a day  melatonin 3 at bedtime PRN  morphine ER Tablet 15 every 12 hours  ondansetron Injectable 4 every 8 hours PRN  oxyCODONE    IR 5 every 4 hours PRN  polyethylene glycol 3350 17 daily  rivaroxaban 2.5 two times a day  senna 2 at bedtime  sodium chloride 3%  Inhalation 4 every 6 hours  tiotropium 2.5 MICROgram(s) Inhaler 2 daily      Vital Signs Last 24 Hrs  T(F): 98.2 (02-05-25 @ 11:40), Max: 99.2 (02-03-25 @ 23:50)    Vital Signs Last 24 Hrs  HR: 77 (02-05-25 @ 11:40) (76 - 84)  BP: 125/60 (02-05-25 @ 11:40) (114/75 - 145/72)  RR: 16 (02-05-25 @ 11:40)  SpO2: 93% (02-05-25 @ 11:40) (92% - 95%)  Wt(kg): --    EXAM:    GA: NAD  CV: nl S1/S2, no RMG  Lungs:  no distress  Abd:  soft, nontender  Ext: no edema  Skin: Intact  IV: no phlebitis    Labs:                        10.7   5.86  )-----------( 276      ( 04 Feb 2025 06:59 )             35.2     02-04    135  |  94[L]  |  36[H]  ----------------------------<  99  4.3   |  32[H]  |  1.15    Ca    8.7      04 Feb 2025 06:59        WBC Trend:  WBC Count: 5.86 (02-04-25 @ 06:59)  WBC Count: 4.43 (02-02-25 @ 07:13)      Creatine Trend:  Creatinine: 1.15 (02-04)  Creatinine: 0.88 (02-02)  Creatinine: 0.84 (01-30)      Liver Biochemical Testing Trend:  Alanine Aminotransferase (ALT/SGPT): 8 *L* (01-30)  Alanine Aminotransferase (ALT/SGPT): 11 (01-27)  Alanine Aminotransferase (ALT/SGPT): 15 (01-27)  Alanine Aminotransferase (ALT/SGPT): 7 (06-20)  Alanine Aminotransferase (ALT/SGPT): 10 (06-19)  Aspartate Aminotransferase (AST/SGOT): 14 (01-30-25 @ 07:13)  Aspartate Aminotransferase (AST/SGOT): 15 (01-27-25 @ 22:26)  Aspartate Aminotransferase (AST/SGOT): 24 (01-27-25 @ 16:35)  Aspartate Aminotransferase (AST/SGOT): 15 (06-20-24 @ 06:10)  Aspartate Aminotransferase (AST/SGOT): 12 (06-19-24 @ 07:09)  Bilirubin Total: 0.4 (01-30)  Bilirubin Total: 0.3 (01-27)  Bilirubin Total: 0.3 (01-27)  Bilirubin Total: 0.3 (06-20)  Bilirubin Total: 0.2 (06-19)      Trend LDH      Urinalysis Basic - ( 04 Feb 2025 06:59 )    Color: x / Appearance: x / SG: x / pH: x  Gluc: 99 mg/dL / Ketone: x  / Bili: x / Urobili: x   Blood: x / Protein: x / Nitrite: x   Leuk Esterase: x / RBC: x / WBC x   Sq Epi: x / Non Sq Epi: x / Bacteria: x        MICROBIOLOGY:  Vancomycin Level, Trough: 13.3 (02-02 @ 10:21)    MRSA PCR Result.: NotDetec (06-12-24 @ 14:54)      Culture - Blood (collected 27 Jan 2025 22:17)  Source: .Blood BLOOD  Final Report:    No growth at 5 days    Culture - Blood (collected 27 Jan 2025 22:10)  Source: .Blood BLOOD  Final Report:    No growth at 5 days    Culture - Fungal, Body Fluid (collected 11 Jun 2024 19:13)  Source: .Body Fluid RIGHT HIP ASPIRATION  Final Report:    No fungus isolated at 4 weeks.    Culture - Blood (collected 11 Jun 2024 06:44)  Source: .Blood Blood-Peripheral  Final Report:    No growth at 5 days    Culture - Blood (collected 11 Jun 2024 06:35)  Source: .Blood Blood-Peripheral  Final Report:    No growth at 5 days    Culture - Urine (collected 11 Jun 2024 01:33)  Source: Clean Catch Clean Catch (Midstream)  Final Report:    >=3 organisms. Probable collection contamination.    Culture - Urine (collected 17 May 2023 00:30)  Source: Clean Catch Clean Catch (Midstream)  Final Report:    <10,000 CFU/mL Normal Urogenital Mone    Culture - Blood (collected 16 May 2023 23:30)  Source: .Blood Blood-Venous  Final Report:    No Growth Final    Culture - Blood (collected 16 May 2023 23:17)  Source: .Blood Blood-Peripheral  Final Report:    No Growth Final    Culture - Blood (collected 12 Apr 2023 11:02)  Source: .Blood Blood-Venous  Final Report:    No Growth Final                                              Sedimentation Rate, Erythrocyte: 89 mm/hr (01-27-25 @ 22:26)  Sedimentation Rate, Erythrocyte: 74 mm/hr (06-12-24 @ 07:15)  Sedimentation Rate, Erythrocyte: 25 mm/hr (06-10-24 @ 21:28)      RADIOLOGY:  imaging below personally reviewed    Xray Chest 1 View- PORTABLE-Urgent:  (04 Feb 2025 10:49)        < from: MR Ankle w/wo IV Cont, Right (01.29.25 @ 18:54) >  IMPRESSION:  Lateral soft tissue ankle wound with osteitis of the adjacent fibula.   This area is susceptible to developing osteomyelitis.  No fluid collection.        --- End of Report ---    < end of copied text >

## 2025-02-05 NOTE — PROGRESS NOTE ADULT - ASSESSMENT
_________________________________________________________________________________________  ========>>  M E D I C A L   A T T E N D I N G    F O L L O W  U P  N O T E  <<=========  -----------------------------------------------------------------------------------------------------    - Patient seen and examined by me earlier today.   - Patient today overall the same     family trying to convince patient about rehabilitation..     ==================>> REVIEW OF SYSTEM <<=================    GEN: no fever, no chills,   RESP: no SOB, no cough, no sputum  CVS: no chest pain, no palpitations  GI: no abdominal pain, no nausea  : no dysuria, no frequency  Neuro: no headache, no dizziness    ==================>> PHYSICAL EXAM <<=================    GEN: A&O X 3 , NAD , comfortable, as above .. in bed .. encouraged out of bed to chair with assistance as needed.   HEENT: NCAT, PERRL, MMM, hearing intact  CVS: S1S2 , regular , No M/R/G appreciated  PULM: scattered wheez >> decreased / improved   ABD.: soft. non tender, non distended,  Extrem: intact pulses , leg edema decreased : stasis changes , wounds dressed        ( Note written / Date of service 02-05-25 ( This is certified to be the same as "ENTERED" date above ( for billing purposes)))    ==================>> MEDICATIONS <<====================    albuterol/ipratropium for Nebulization 3 milliLiter(s) Nebulizer every 6 hours  atorvastatin 40 milliGRAM(s) Oral at bedtime  cefepime   IVPB 1000 milliGRAM(s) IV Intermittent every 8 hours  chlorhexidine 2% Cloths 1 Application(s) Topical daily  cloNIDine 0.1 milliGRAM(s) Oral every 12 hours  collagenase Ointment 1 Application(s) Topical daily  fluticasone propionate/ salmeterol 250-50 MICROgram(s) Diskus 1 Dose(s) Inhalation two times a day  furosemide    Tablet 20 milliGRAM(s) Oral daily  gabapentin 400 milliGRAM(s) Oral two times a day  morphine ER Tablet 15 milliGRAM(s) Oral every 12 hours  polyethylene glycol 3350 17 Gram(s) Oral daily  rivaroxaban 2.5 milliGRAM(s) Oral two times a day  senna 2 Tablet(s) Oral at bedtime  sodium chloride 3%  Inhalation 4 milliLiter(s) Inhalation every 6 hours  thiamine 100 milliGRAM(s) Oral daily  tiotropium 2.5 MICROgram(s) Inhaler 2 Puff(s) Inhalation daily  trimethoprim  160 mG/sulfamethoxazole 800 mG 2 Tablet(s) Oral two times a day    MEDICATIONS  (PRN):  acetaminophen     Tablet .. 650 milliGRAM(s) Oral every 6 hours PRN Temp greater or equal to 38C (100.4F), Mild Pain (1 - 3)  melatonin 3 milliGRAM(s) Oral at bedtime PRN Insomnia  naloxone Injectable 0.2 milliGRAM(s) IV Push every 3 minutes PRN respiratory depression/ suspected opioid OD  nicotine  Polacrilex Gum 4 milliGRAM(s) Oral four times a day PRN Smoking Cessation  ondansetron Injectable 4 milliGRAM(s) IV Push every 8 hours PRN Nausea and/or Vomiting  oxyCODONE    IR 5 milliGRAM(s) Oral every 4 hours PRN Severe Pain (7 - 10)    ___________  Active diet:  Diet, DASH/TLC:   Sodium & Cholesterol Restricted  ___________________    ==================>> VITAL SIGNS <<==================    Vital Signs Last 24 HrsT(C): 36.7 (02-05-25 @ 05:00)  T(F): 98.1 (02-05-25 @ 05:00), Max: 98.2 (02-04-25 @ 12:09)  HR: 84 (02-05-25 @ 05:00) (76 - 84)  BP: 145/72 (02-05-25 @ 05:00)  RR: 18 (02-05-25 @ 05:00) (18 - 18)  SpO2: 95% (02-05-25 @ 05:00) (92% - 95%)         ==================>> LAB AND IMAGING <<==================                        10.7   5.86  )-----------( 276      ( 04 Feb 2025 06:59 )             35.2        02-04    135  |  94[L]  |  36[H]  ----------------------------<  99  4.3   |  32[H]  |  1.15    Ca    8.7      04 Feb 2025 06:59      WBC count:   5.86 <<== ,  4.43 <<==   Hemoglobin:   10.7 <<==,  10.7 <<==  platelets:  276 <==, 260 <==, 214 <==    Creatinine:  1.15  <<==, 0.88  <<==  Sodium:   135  <==, 142  <==       AST:          14(01-30) <== , 15(01-27) <== , 24(01-27) <==      ALT:        8(01-30)  <== , 11(01-27)  <== , 15(01-27)  <==      AP:        80(01-30)  <=, 109(01-27)  <=, 121(01-27)  <=     Bili:        0.4(01-30)  <=, 0.3(01-27)  <=, 0.3(01-27)  <=    ____________________________    M I C R O B I O L O G Y :    Culture - Wound Aerobic/Anaerobic (collected 29 Jan 2025 07:19)  Source: Skin/Wound  Final Report (03 Feb 2025 13:06):    Moderate Methicillin Resistant Staphylococcus aureus    Rare Stenotrophomonas maltophilia See previous culture [id number]    for susceptibility  Organism: Methicillin resistant Staphylococcus aureus (03 Feb 2025 13:06)  Organism: Methicillin resistant Staphylococcus aureus (03 Feb 2025 13:06)    Sensitivities:      -  Clindamycin: S <=0.25      -  Oxacillin: R >2      -  Gentamicin: S <=4 Should not be used as monotherapy      -  Daptomycin: S 1      -  Linezolid: S 2      -  Vancomycin: S 1      -  Tetracycline: I 8      Method Type: DEVAN      -  Penicillin: R >2      -  Rifampin: S <=1 Should not be used as monotherapy      -  Erythromycin: R >4      -  Trimethoprim/Sulfamethoxazole: S <=0.5/9.5    Culture - Wound Aerobic/Anaerobic (collected 29 Jan 2025 07:19)  Source: Skin/Wound  Final Report (03 Feb 2025 07:50):    Numerous Pseudomonas aeruginosa    Rare Methicillin Resistant Staphylococcus aureus    Rare Stenotrophomonas maltophilia  Organism: Pseudomonas aeruginosa  Methicillin resistant Staphylococcus aureus  Stenotrophomonas maltophilia (03 Feb 2025 07:50)  Organism: Stenotrophomonas maltophilia (03 Feb 2025 07:50)    Sensitivities:      -  Minocycline: S      Method Type: KB  Organism: Stenotrophomonas maltophilia (03 Feb 2025 07:50)    Sensitivities:      -  Levofloxacin: S 1      Method Type: DEVAN      -  Trimethoprim/Sulfamethoxazole: S <=0.5/9.5  Organism: Methicillin resistant Staphylococcus aureus (03 Feb 2025 07:50)    Sensitivities:      -  Clindamycin: S <=0.25      -  Oxacillin: R >2      -  Gentamicin: S <=4 Should not be used as monotherapy      -  Daptomycin: S 1      -  Linezolid: S 2      -  Vancomycin: S 1      -  Tetracycline: I 8      Method Type: DEVAN      -  Penicillin: R >2      -  Rifampin: S <=1 Should not be used as monotherapy      -  Erythromycin: R >4      -  Trimethoprim/Sulfamethoxazole: S <=0.5/9.5  Organism: Pseudomonas aeruginosa (03 Feb 2025 07:50)    Sensitivities:      -  Levofloxacin: S <=0.5      -  Aztreonam: S <=4      -  Cefepime: S <=2      -  Piperacillin/Tazobactam: S <=8      -  Ciprofloxacin: S <=0.25      -  Imipenem: S <=1      Method Type: DEVAN      -  Meropenem: S <=1      -  Ceftazidime: S 4      -  Amikacin: S <=16    Culture - Blood (collected 27 Jan 2025 22:17)  Source: .Blood BLOOD  Final Report (02 Feb 2025 04:01):    No growth at 5 days    Culture - Blood (collected 27 Jan 2025 22:10)  Source: .Blood BLOOD  Final Report (02 Feb 2025 04:01):    No growth at 5 days            < from: TTE W or WO Ultrasound Enhancing Agent (01.28.25 @ 13:55) >  CONCLUSIONS:    1. Left ventricular cavity is small. Left ventricular wall thickness is normal. Left ventricular systolic function is normal with an ejection fraction of 73 % by Rios's method of disks.   2. Mildly enlarged right ventricular cavity size, with normal wall thickness, and normal right ventricular systolic function.   3. Moderate to severe aortic stenosis.   4. Moderate tricuspid regurgitation.   5. Estimated pulmonary artery systolic pressure is 75 mmHg, consistent with severe pulmonary hypertension.   6. No pericardial effusion seen.   7. Compared to the transthoracic echocardiogram performed on 4/12/2023, there have been no significant interval changes.  < end of copied text >    < from: CT Angio Chest PE Protocol w/ IV Cont (01.29.25 @ 08:47) >  IMPRESSION:  No pulmonary embolism.  Right upper lobe groundglass opacities and right lower lobe nodular   opacities are likely infectious/inflammatory.  Small bilateral pleural effusions.  < end of copied text >    < from: [facility] Duplex Lower Ext Vein Scan, Bilat (01.27.25 @ 20:06) >  IMPRESSION:  No evidence of deep venous thrombosis in either lower extremity.  < end of copied text >    < from: Xray Knee 3 Views, Right (01.27.25 @ 23:18) >  IMPRESSION:  1.  Right longstem femoral revision hardware with lucency surrounding the   hardware and marked subsidence in keeping with loosening.  2.  Lucency at the lateral cortex of the proximal femur may reflect   postoperative change versus osteolysis.  3.  Pseudoarticulation between the proximal femur and right iliac bone  < end of copied text >    < from: [facility] Duplex Lower Ext Vein Scan, Bilat (01.27.25 @ 20:06) >  IMPRESSION:  No evidence of deep venous thrombosis in either lower extremity.  < end of copied text >    < from: TTE W or WO Ultrasound Enhancing Agent (06.17.24 @ 17:12) >  CONCLUSIONS:    1. Left ventricular cavity is normal in size. Left ventricular wall thickness is normal. Left ventricular systolic function is normal with an ejection fraction of 64 % by Rios's method of disks. There are no regional wall motion abnormalities seen.   2. There is mild (grade 1) left ventricular diastolic dysfunction.   3. Normal right ventricular cavity size and normal systolic function.   4. Structurally normal mitral valve with normal leaflet excursion. There is calcification of the mitral valve annulus. There is mild mitral regurgitation.   5. The aortic valve anatomy cannot be determined with reduced systolic excursion. There is calcification of the aortic valve leaflets. There is severe aortic stenosis. The peak transaortic velocity is 3.94 m/s, peak transaortic gradient is 62.1 mmHg and mean transaortic gradient is 32.0 mmHg with an LVOT/aortic valve VTI ratio of 0.22. The aorticvalve area is estimated at 0.51 cm² by the continuity equation. There is mild to moderate aortic regurgitation.   6. The left atrium is mildly dilated with an indexed volume of 41 ml/m².   7. No prior echocardiogram is available for comparison.  < end of copied text >    ___________________________________________________________________________________  ===============>>  A S S E S S M E N T   A N D   P L A N <<===============  ------------------------------------------------------------------------------------------    79F w/ hx of ETOH in past, PAD w/ b/l carotid artery stenosis, HFpEF EF 64% w/ severe AS, COPD, HTN, CAD, RLE DVT 8/2023 w/ hx of R hip infection p/w worsening R hip pain     Problem/Plan - 1:  ·  Problem: right hip pain.   ·  Plan: Patient endorsing severe R hip pain w/o inciting fall or traumatic event. 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 06/2024 admission with possible joint infection. No clear signs of infection currently and exam is equivocal. -Pain control as ordered [noting there is some substance use history]     >> pain management follow up and further management            Bowel regimen as needed   -cultures as above   - Ortho following , noted   - antibiotics  per ID: ID follow up > duration of therapy..  PICC..     ## additional osteomyelitis of ankle     podiatry following, appreciated    ID following for antibiotics Rx >> will need long term antibiotics Rx ( patient at this time not interested in going to rehabilitation but unclear if she can take of administration of antibiotics at home ! )     otherwise as above    local wound care     Problem/Plan - 2:  ·  Problem: acute exacerbation of Chronic heart failure with preserved ejection fraction (HFpEF), edema, elevated BNP     in patient with severe Aortic stenosis as above   repeat TTE as above with AS, MR, PAH  diuretics per cardio ( patient declined some doses before)   -Daily weight and I/O  cardio following   Xarelto 2.5 mg BID per cardio    CTA and Duplex negative for VTE      Problem/Plan - 3:  ·  Problem: COPD   ? has 02 PRN at home. Patient smoking tobacco, not clarifying how much.  -Duonebs Q6hrs  -Cont. Symbicort BID  -Cont. Spiriva  - pulm appreciated     ## pneumonia on CT as above >> post antibiotics >> monitor     lung sounds and breathing better     ID on board     pulm appreciated      Problem/Plan - 4:  ·  Problem: CAD (coronary artery disease).   ·  Plan: Patient states she does not take aspirin or atorvastatin at home.  cardio following, appreciated     Problem/Plan - 5:  ·  Problem: EtOH dependence.   no signs of withdrawal      Problem/Plan - 6:  ·  Problem: history of Severe aortic stenosis.   repeat echo as above : unchanged   cardio appreciated     Problem/Plan - 7:  ·  Problem: Essential hypertension.   ·  Plan: -Trend BP  -Cont. Clonidine BID  -Patient does not recall taking Nifedipine, can resume prior dose if BPs uncontrolled.     Problem/Plan - 8:  ·  Problem: Prophylactic measure.   ·  Plan: DVT PPx  xarelto     --------------------------------------------  Case discussed with patient, pt's daughter over the phone updated and in agreement with plan   Education given on findings and plan of care  ___________________________  H. GONZALO Perez.  Pager: [phone]

## 2025-02-05 NOTE — PROGRESS NOTE ADULT - SUBJECTIVE AND OBJECTIVE BOX
Cardiovascular Disease Progress Note    Overnight events: No acute events overnight.  no new cardiac sx  Otherwise review of systems negative    Objective Findings:  T(C): 36.7 (02-05-25 @ 05:00), Max: 36.9 (02-04-25 @ 08:45)  HR: 84 (02-05-25 @ 05:00) (76 - 84)  BP: 145/72 (02-05-25 @ 05:00) (112/54 - 145/72)  RR: 18 (02-05-25 @ 05:00) (18 - 18)  SpO2: 95% (02-05-25 @ 05:00) (88% - 95%)  Wt(kg): --  Daily     Daily       Physical Exam:  Gen: NAD  HEENT: EOMI  CV: RRR, normal S1 + S2, 2/6 gardenia  Lungs: CTAB  Abd: soft, non-tender  Ext: No edema    Telemetry:    Laboratory Data:                        10.7   5.86  )-----------( 276      ( 04 Feb 2025 06:59 )             35.2     02-04    135  |  94[L]  |  36[H]  ----------------------------<  99  4.3   |  32[H]  |  1.15    Ca    8.7      04 Feb 2025 06:59                Inpatient Medications:  MEDICATIONS  (STANDING):  albuterol/ipratropium for Nebulization 3 milliLiter(s) Nebulizer every 6 hours  atorvastatin 40 milliGRAM(s) Oral at bedtime  cefepime   IVPB 1000 milliGRAM(s) IV Intermittent every 8 hours  chlorhexidine 2% Cloths 1 Application(s) Topical daily  cloNIDine 0.1 milliGRAM(s) Oral every 12 hours  collagenase Ointment 1 Application(s) Topical daily  fluticasone propionate/ salmeterol 250-50 MICROgram(s) Diskus 1 Dose(s) Inhalation two times a day  furosemide    Tablet 20 milliGRAM(s) Oral daily  gabapentin 400 milliGRAM(s) Oral two times a day  morphine ER Tablet 15 milliGRAM(s) Oral every 12 hours  polyethylene glycol 3350 17 Gram(s) Oral daily  rivaroxaban 2.5 milliGRAM(s) Oral two times a day  senna 2 Tablet(s) Oral at bedtime  sodium chloride 3%  Inhalation 4 milliLiter(s) Inhalation every 6 hours  thiamine 100 milliGRAM(s) Oral daily  tiotropium 2.5 MICROgram(s) Inhaler 2 Puff(s) Inhalation daily  trimethoprim  160 mG/sulfamethoxazole 800 mG 2 Tablet(s) Oral two times a day      Assessment:  79F w/ hx of ETOH in past, PAD w/ b/l carotid artery stenosis, HFpEF EF 64% w/ severe AS, COPD, HTN, CAD, RLE DVT 8/2023 w/ hx of R hip infection p/w worsening R hip pain and hypoxic resp failure    Recs:  cardiac stable  no e/o acs  cw antiplatelet, statin and antianginals for cad/stent. denies chest pain. hold off on ischemic eval for now   vol status stable =cw lasix to 20mg po qd. gentle diuresis as patient is preload dependent in setting of AS  s/p echo, results noted. mod to severe AS. doubt would be a TAVR candidate in setting of chronic hip infection and risk of endocarditis  tx for copd per pulmonary  cw xarelto 2.5mg bid for PAD dosing and "ppx for hx of VTE"   pain control  abx per ID  f/u ortho recs          Over 25 minutes spent on total encounter; more than 50% of the visit was spent counseling and/or coordinating care by the attending physician.      Chaka Lawrence MD   Cardiovascular Disease  [phone]

## 2025-02-05 NOTE — PROGRESS NOTE ADULT - SUBJECTIVE AND OBJECTIVE BOX
CHIEF COMPLAINT:    Interval Events:    REVIEW OF SYSTEMS:  No change in multiple complaints. breathing the same, andrez not able to bring up mucus though not using her devices correcttly  [ ] All other systems negative  [ ] Unable to assess ROS because ________    OBJECTIVE:  ICU Vital Signs Last 24 Hrs  T(C): 36.8 (05 Feb 2025 11:40), Max: 36.8 (05 Feb 2025 11:40)  T(F): 98.2 (05 Feb 2025 11:40), Max: 98.2 (05 Feb 2025 11:40)  HR: 77 (05 Feb 2025 11:40) (77 - 84)  BP: 125/60 (05 Feb 2025 11:40) (114/75 - 145/72)  BP(mean): --  ABP: --  ABP(mean): --  RR: 16 (05 Feb 2025 11:40) (16 - 18)  SpO2: 93% (05 Feb 2025 11:40) (92% - 95%)    O2 Parameters below as of 05 Feb 2025 11:40  Patient On (Oxygen Delivery Method): nasal cannula  O2 Flow (L/min): 5            02-04 @ 07:01  -  02-05 @ 07:00  --------------------------------------------------------  IN: 500 mL / OUT: 1500 mL / NET: -1000 mL    02-05 @ 07:01 - 02-05 @ 20:20  --------------------------------------------------------  IN: 0 mL / OUT: 800 mL / NET: -800 mL      CAPILLARY BLOOD GLUCOSE          PHYSICAL EXAM:  General:  nad  Neck: no jvd  Respiratory: Ronchi and some wheeze. no distress  Cardiovascular: S1 S2 RRR  Abdomen: Soft NT ND  Extremities: wounds dressed, some residual edema on wrikcled LEs.   Skin: multiple wounds.   Neurological:  Psychiatry: Anxious and tangential though overall plesent lady.     HOSPITAL MEDICATIONS:  Standing Meds:  albuterol/ipratropium for Nebulization 3 milliLiter(s) Nebulizer every 6 hours  atorvastatin 40 milliGRAM(s) Oral at bedtime  cefepime   IVPB 1000 milliGRAM(s) IV Intermittent every 8 hours  chlorhexidine 2% Cloths 1 Application(s) Topical daily  cloNIDine 0.1 milliGRAM(s) Oral every 12 hours  collagenase Ointment 1 Application(s) Topical daily  fluticasone propionate/ salmeterol 250-50 MICROgram(s) Diskus 1 Dose(s) Inhalation two times a day  furosemide    Tablet 20 milliGRAM(s) Oral daily  gabapentin 400 milliGRAM(s) Oral two times a day  morphine ER Tablet 15 milliGRAM(s) Oral every 12 hours  polyethylene glycol 3350 17 Gram(s) Oral daily  rivaroxaban 2.5 milliGRAM(s) Oral two times a day  senna 2 Tablet(s) Oral at bedtime  sodium chloride 3%  Inhalation 4 milliLiter(s) Inhalation every 6 hours  thiamine 100 milliGRAM(s) Oral daily  tiotropium 2.5 MICROgram(s) Inhaler 2 Puff(s) Inhalation daily  trimethoprim  160 mG/sulfamethoxazole 800 mG 2 Tablet(s) Oral two times a day      PRN Meds:  acetaminophen     Tablet .. 650 milliGRAM(s) Oral every 6 hours PRN  melatonin 3 milliGRAM(s) Oral at bedtime PRN  naloxone Injectable 0.2 milliGRAM(s) IV Push every 3 minutes PRN  nicotine  Polacrilex Gum 4 milliGRAM(s) Oral four times a day PRN  ondansetron Injectable 4 milliGRAM(s) IV Push every 8 hours PRN  oxyCODONE    IR 5 milliGRAM(s) Oral every 4 hours PRN      LABS:                        10.7   5.86  )-----------( 276      ( 04 Feb 2025 06:59 )             35.2     Hgb Trend: 10.7<--, 10.7<--, 10.4<--, 9.2<--  02-04    135  |  94[L]  |  36[H]  ----------------------------<  99  4.3   |  32[H]  |  1.15    Ca    8.7      04 Feb 2025 06:59      Creatinine Trend: 1.15<--, 0.88<--, 0.84<--, 1.08<--, 0.93<--    Urinalysis Basic - ( 04 Feb 2025 06:59 )    Color: x / Appearance: x / SG: x / pH: x  Gluc: 99 mg/dL / Ketone: x  / Bili: x / Urobili: x   Blood: x / Protein: x / Nitrite: x   Leuk Esterase: x / RBC: x / WBC x   Sq Epi: x / Non Sq Epi: x / Bacteria: x            MICROBIOLOGY:     RADIOLOGY:  [ ] Reviewed and interpreted by me    PULMONARY FUNCTION TESTS:    EKG:

## 2025-02-06 LAB
ANION GAP SERPL CALC-SCNC: 9 MMOL/L — SIGNIFICANT CHANGE UP (ref 5–17)
BUN SERPL-MCNC: 40 MG/DL — HIGH (ref 7–23)
CALCIUM SERPL-MCNC: 8.8 MG/DL — SIGNIFICANT CHANGE UP (ref 8.4–10.5)
CHLORIDE SERPL-SCNC: 98 MMOL/L — SIGNIFICANT CHANGE UP (ref 96–108)
CO2 SERPL-SCNC: 27 MMOL/L — SIGNIFICANT CHANGE UP (ref 22–31)
CREAT SERPL-MCNC: 1.27 MG/DL — SIGNIFICANT CHANGE UP (ref 0.5–1.3)
EGFR: 44 ML/MIN/1.73M2 — LOW
GLUCOSE SERPL-MCNC: 91 MG/DL — SIGNIFICANT CHANGE UP (ref 70–99)
HCT VFR BLD CALC: 33.3 % — LOW (ref 34.5–45)
HGB BLD-MCNC: 10 G/DL — LOW (ref 11.5–15.5)
MCHC RBC-ENTMCNC: 29.5 PG — SIGNIFICANT CHANGE UP (ref 27–34)
MCHC RBC-ENTMCNC: 30 G/DL — LOW (ref 32–36)
MCV RBC AUTO: 98.2 FL — SIGNIFICANT CHANGE UP (ref 80–100)
NRBC # BLD: 0 /100 WBCS — SIGNIFICANT CHANGE UP (ref 0–0)
NRBC BLD-RTO: 0 /100 WBCS — SIGNIFICANT CHANGE UP (ref 0–0)
PLATELET # BLD AUTO: 287 K/UL — SIGNIFICANT CHANGE UP (ref 150–400)
POTASSIUM SERPL-MCNC: 5.2 MMOL/L — SIGNIFICANT CHANGE UP (ref 3.5–5.3)
POTASSIUM SERPL-SCNC: 5.2 MMOL/L — SIGNIFICANT CHANGE UP (ref 3.5–5.3)
RBC # BLD: 3.39 M/UL — LOW (ref 3.8–5.2)
RBC # FLD: 16.4 % — HIGH (ref 10.3–14.5)
SODIUM SERPL-SCNC: 134 MMOL/L — LOW (ref 135–145)
WBC # BLD: 5.66 K/UL — SIGNIFICANT CHANGE UP (ref 3.8–10.5)
WBC # FLD AUTO: 5.66 K/UL — SIGNIFICANT CHANGE UP (ref 3.8–10.5)

## 2025-02-06 RX ORDER — ACETAMINOPHEN 500 MG/5ML
1000 LIQUID (ML) ORAL ONCE
Refills: 0 | Status: COMPLETED | OUTPATIENT
Start: 2025-02-06 | End: 2025-02-06

## 2025-02-06 RX ADMIN — CEFEPIME 100 MILLIGRAM(S): 2 INJECTION, POWDER, FOR SOLUTION INTRAVENOUS at 21:12

## 2025-02-06 RX ADMIN — OXYCODONE HYDROCHLORIDE 5 MILLIGRAM(S): 30 TABLET ORAL at 01:52

## 2025-02-06 RX ADMIN — Medication 400 MILLIGRAM(S): at 18:24

## 2025-02-06 RX ADMIN — Medication 15 MILLIGRAM(S): at 06:22

## 2025-02-06 RX ADMIN — Medication 1 DOSE(S): at 06:22

## 2025-02-06 RX ADMIN — Medication 1 APPLICATION(S): at 12:50

## 2025-02-06 RX ADMIN — Medication 2 TABLET(S): at 18:25

## 2025-02-06 RX ADMIN — Medication 15 MILLIGRAM(S): at 18:25

## 2025-02-06 RX ADMIN — Medication 4 MILLILITER(S): at 06:23

## 2025-02-06 RX ADMIN — CEFEPIME 100 MILLIGRAM(S): 2 INJECTION, POWDER, FOR SOLUTION INTRAVENOUS at 13:59

## 2025-02-06 RX ADMIN — IPRATROPIUM BROMIDE AND ALBUTEROL SULFATE 3 MILLILITER(S): .5; 2.5 SOLUTION RESPIRATORY (INHALATION) at 12:37

## 2025-02-06 RX ADMIN — POLYETHYLENE GLYCOL 3350 17 GRAM(S): 17 POWDER, FOR SOLUTION ORAL at 12:37

## 2025-02-06 RX ADMIN — GABAPENTIN 400 MILLIGRAM(S): 400 CAPSULE ORAL at 18:25

## 2025-02-06 RX ADMIN — OXYCODONE HYDROCHLORIDE 5 MILLIGRAM(S): 30 TABLET ORAL at 21:42

## 2025-02-06 RX ADMIN — IPRATROPIUM BROMIDE AND ALBUTEROL SULFATE 3 MILLILITER(S): .5; 2.5 SOLUTION RESPIRATORY (INHALATION) at 06:23

## 2025-02-06 RX ADMIN — Medication 2 TABLET(S): at 06:23

## 2025-02-06 RX ADMIN — Medication 100 MILLIGRAM(S): at 12:37

## 2025-02-06 RX ADMIN — Medication 4 MILLILITER(S): at 12:37

## 2025-02-06 RX ADMIN — IPRATROPIUM BROMIDE AND ALBUTEROL SULFATE 3 MILLILITER(S): .5; 2.5 SOLUTION RESPIRATORY (INHALATION) at 18:25

## 2025-02-06 RX ADMIN — Medication 4 MILLILITER(S): at 18:26

## 2025-02-06 RX ADMIN — RIVAROXABAN 2.5 MILLIGRAM(S): 10 TABLET, FILM COATED ORAL at 18:26

## 2025-02-06 RX ADMIN — Medication 3 MILLIGRAM(S): at 01:53

## 2025-02-06 RX ADMIN — CEFEPIME 100 MILLIGRAM(S): 2 INJECTION, POWDER, FOR SOLUTION INTRAVENOUS at 06:22

## 2025-02-06 RX ADMIN — Medication 0.1 MILLIGRAM(S): at 18:25

## 2025-02-06 RX ADMIN — OXYCODONE HYDROCHLORIDE 5 MILLIGRAM(S): 30 TABLET ORAL at 21:12

## 2025-02-06 RX ADMIN — GABAPENTIN 400 MILLIGRAM(S): 400 CAPSULE ORAL at 06:22

## 2025-02-06 RX ADMIN — Medication 1 APPLICATION(S): at 12:49

## 2025-02-06 RX ADMIN — OXYCODONE HYDROCHLORIDE 5 MILLIGRAM(S): 30 TABLET ORAL at 12:37

## 2025-02-06 RX ADMIN — TIOTROPIUM BROMIDE INHALATION SPRAY 2 PUFF(S): 3.12 SPRAY, METERED RESPIRATORY (INHALATION) at 12:38
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
## 2025-02-06 NOTE — PROGRESS NOTE ADULT - SUBJECTIVE AND OBJECTIVE BOX
Cardiovascular Disease Progress Note    Overnight events: No acute events overnight.  no new cardiac sx  Otherwise review of systems negative    Objective Findings:  T(C): 36.8 (02-06-25 @ 05:21), Max: 37.1 (02-05-25 @ 21:33)  HR: 72 (02-06-25 @ 05:21) (72 - 77)  BP: 115/57 (02-06-25 @ 05:21) (115/57 - 130/74)  RR: 18 (02-06-25 @ 05:21) (16 - 18)  SpO2: 96% (02-06-25 @ 05:21) (93% - 97%)  Wt(kg): --  Daily     Daily       Physical Exam:  Gen: NAD  HEENT: EOMI  CV: RRR, normal S1 + S2, no m/r/g  Lungs: CTAB  Abd: soft, non-tender  Ext: No edema    Telemetry:    Laboratory Data:                    Inpatient Medications:  MEDICATIONS  (STANDING):  albuterol/ipratropium for Nebulization 3 milliLiter(s) Nebulizer every 6 hours  atorvastatin 40 milliGRAM(s) Oral at bedtime  cefepime   IVPB 1000 milliGRAM(s) IV Intermittent every 8 hours  chlorhexidine 2% Cloths 1 Application(s) Topical daily  cloNIDine 0.1 milliGRAM(s) Oral every 12 hours  collagenase Ointment 1 Application(s) Topical daily  fluticasone propionate/ salmeterol 250-50 MICROgram(s) Diskus 1 Dose(s) Inhalation two times a day  furosemide    Tablet 20 milliGRAM(s) Oral daily  gabapentin 400 milliGRAM(s) Oral two times a day  morphine ER Tablet 15 milliGRAM(s) Oral every 12 hours  polyethylene glycol 3350 17 Gram(s) Oral daily  rivaroxaban 2.5 milliGRAM(s) Oral two times a day  senna 2 Tablet(s) Oral at bedtime  sodium chloride 3%  Inhalation 4 milliLiter(s) Inhalation every 6 hours  thiamine 100 milliGRAM(s) Oral daily  tiotropium 2.5 MICROgram(s) Inhaler 2 Puff(s) Inhalation daily  trimethoprim  160 mG/sulfamethoxazole 800 mG 2 Tablet(s) Oral two times a day      Assessment:  79F w/ hx of ETOH in past, PAD w/ b/l carotid artery stenosis, HFpEF EF 64% w/ severe AS, COPD, HTN, CAD, RLE DVT 8/2023 w/ hx of R hip infection p/w worsening R hip pain and hypoxic resp failure    Recs:  cardiac stable  no e/o acs  cw antiplatelet, statin and antianginals for cad/stent. denies chest pain. hold off on ischemic eval for now   vol status stable =cw lasix to 20mg po qd. gentle diuresis as patient is preload dependent in setting of AS  s/p echo, results noted. mod to severe AS. doubt would be a TAVR candidate in setting of chronic hip infection and risk of endocarditis  tx for copd per pulmonary  cw xarelto 2.5mg bid for PAD dosing and "ppx for hx of VTE"   pain control  abx per ID  f/u ortho recs          Over 25 minutes spent on total encounter; more than 50% of the visit was spent counseling and/or coordinating care by the attending physician.      Chaka Lawrence MD   Cardiovascular Disease  [phone]

## 2025-02-07 PROCEDURE — G0545: CPT

## 2025-02-07 PROCEDURE — 99232 SBSQ HOSP IP/OBS MODERATE 35: CPT

## 2025-02-07 PROCEDURE — 99233 SBSQ HOSP IP/OBS HIGH 50: CPT

## 2025-02-07 RX ORDER — ACETAMINOPHEN 500 MG/5ML
1000 LIQUID (ML) ORAL ONCE
Refills: 0 | Status: COMPLETED | OUTPATIENT
Start: 2025-02-07 | End: 2025-02-08

## 2025-02-07 RX ADMIN — GABAPENTIN 400 MILLIGRAM(S): 400 CAPSULE ORAL at 17:06

## 2025-02-07 RX ADMIN — CEFEPIME 100 MILLIGRAM(S): 2 INJECTION, POWDER, FOR SOLUTION INTRAVENOUS at 14:34

## 2025-02-07 RX ADMIN — Medication 1 APPLICATION(S): at 12:02

## 2025-02-07 RX ADMIN — Medication 0.1 MILLIGRAM(S): at 05:57

## 2025-02-07 RX ADMIN — Medication 15 MILLIGRAM(S): at 05:57

## 2025-02-07 RX ADMIN — CEFEPIME 100 MILLIGRAM(S): 2 INJECTION, POWDER, FOR SOLUTION INTRAVENOUS at 21:04

## 2025-02-07 RX ADMIN — OXYCODONE HYDROCHLORIDE 5 MILLIGRAM(S): 30 TABLET ORAL at 21:40

## 2025-02-07 RX ADMIN — IPRATROPIUM BROMIDE AND ALBUTEROL SULFATE 3 MILLILITER(S): .5; 2.5 SOLUTION RESPIRATORY (INHALATION) at 17:05

## 2025-02-07 RX ADMIN — Medication 4 MILLILITER(S): at 12:00

## 2025-02-07 RX ADMIN — OXYCODONE HYDROCHLORIDE 5 MILLIGRAM(S): 30 TABLET ORAL at 14:34

## 2025-02-07 RX ADMIN — Medication 2 TABLET(S): at 17:06

## 2025-02-07 RX ADMIN — Medication 1 DOSE(S): at 17:27

## 2025-02-07 RX ADMIN — RIVAROXABAN 2.5 MILLIGRAM(S): 10 TABLET, FILM COATED ORAL at 17:06

## 2025-02-07 RX ADMIN — IPRATROPIUM BROMIDE AND ALBUTEROL SULFATE 3 MILLILITER(S): .5; 2.5 SOLUTION RESPIRATORY (INHALATION) at 12:00

## 2025-02-07 RX ADMIN — ATORVASTATIN CALCIUM 40 MILLIGRAM(S): 80 TABLET, FILM COATED ORAL at 21:04

## 2025-02-07 RX ADMIN — IPRATROPIUM BROMIDE AND ALBUTEROL SULFATE 3 MILLILITER(S): .5; 2.5 SOLUTION RESPIRATORY (INHALATION) at 05:58

## 2025-02-07 RX ADMIN — CEFEPIME 100 MILLIGRAM(S): 2 INJECTION, POWDER, FOR SOLUTION INTRAVENOUS at 06:02

## 2025-02-07 RX ADMIN — POLYETHYLENE GLYCOL 3350 17 GRAM(S): 17 POWDER, FOR SOLUTION ORAL at 12:02

## 2025-02-07 RX ADMIN — GABAPENTIN 400 MILLIGRAM(S): 400 CAPSULE ORAL at 05:57

## 2025-02-07 RX ADMIN — Medication 4 MILLILITER(S): at 00:01

## 2025-02-07 RX ADMIN — Medication 4 MILLILITER(S): at 05:58

## 2025-02-07 RX ADMIN — IPRATROPIUM BROMIDE AND ALBUTEROL SULFATE 3 MILLILITER(S): .5; 2.5 SOLUTION RESPIRATORY (INHALATION) at 00:01

## 2025-02-07 RX ADMIN — Medication 100 MILLIGRAM(S): at 12:03

## 2025-02-07 RX ADMIN — OXYCODONE HYDROCHLORIDE 5 MILLIGRAM(S): 30 TABLET ORAL at 10:31

## 2025-02-07 RX ADMIN — OXYCODONE HYDROCHLORIDE 5 MILLIGRAM(S): 30 TABLET ORAL at 21:04

## 2025-02-07 RX ADMIN — Medication 4 MILLILITER(S): at 17:27

## 2025-02-07 RX ADMIN — Medication 15 MILLIGRAM(S): at 17:06

## 2025-02-07 RX ADMIN — Medication 1 DOSE(S): at 06:08

## 2025-02-07 RX ADMIN — OXYCODONE HYDROCHLORIDE 5 MILLIGRAM(S): 30 TABLET ORAL at 11:01

## 2025-02-07 RX ADMIN — OXYCODONE HYDROCHLORIDE 5 MILLIGRAM(S): 30 TABLET ORAL at 15:04

## 2025-02-07 RX ADMIN — TIOTROPIUM BROMIDE INHALATION SPRAY 2 PUFF(S): 3.12 SPRAY, METERED RESPIRATORY (INHALATION) at 12:00

## 2025-02-07 RX ADMIN — Medication 3 MILLIGRAM(S): at 21:03

## 2025-02-07 RX ADMIN — Medication 15 MILLIGRAM(S): at 06:27

## 2025-02-07 RX ADMIN — RIVAROXABAN 2.5 MILLIGRAM(S): 10 TABLET, FILM COATED ORAL at 05:56

## 2025-02-07 RX ADMIN — Medication 2 TABLET(S): at 05:56

## 2025-02-07 NOTE — PROGRESS NOTE ADULT - ASSESSMENT
79F w/ hx of ETOH in past, PAD w/ b/l carotid artery stenosis, HFpEF EF 64% w/ severe AS, COPD, HTN, CAD, RLE DVT 8/2023 w/ hx of R hip infection p/w worsening R hip pain. Patient is poor historian, not answering all questions she is being asked, continued requests for pain medication. Patient endorses being admitted to [facility] for multiple days for hip pain. States she did not receive antibiotics. Cannot specify nature of her admission. States she fell in front of [employer] before [place] admission but not since. States she was able to bear weight after leaving hospital around 3 days ago but pain has progressively worsened and does not think she can ambulate now. Denies fevers, chills or additional trauma. States she does not remember her medications and is not compliant with many of them outside of hypertension.     Workup:   -MRI R ankle: Lateral soft tissue ankle wound with osteitis of the adjacent fibula. This area is susceptible to developing osteomyelitis. No fluid collection.  -Xray R hip: Right longstem femoral revision hardware with lucency surrounding the hardware and marked subsidence in keeping with loosening.Lucency at the lateral cortex of the proximal femur may reflect postoperative change versus osteolysis.Pseudoarticulation between the proximal femur and right iliac bone  -CT angio chest: No pulmonary embolism. Right upper lobe groundglass opacities and right lower lobe nodular opacities are likely infectious/inflammatory. Small bilateral pleural effusions.  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   #Acute hypoxic respiratory failure, abnormal CT chest  #Recent fall     Recommendations:   -Continue Cefepime and Bactrim while inpatient   -Podiatry following - clinical concern for ankle OM but "No acute podiatric surgical intervention, OM extends above podiatric surgical scope"  -Orthopedics surgery following  - no intervention for R ankle osteitis/OM, offered Girdlestone procedure for R hip chronic PJI, however patient refused  -When stable for discharge, plan to transition to Levofloxacin 750 mg po q48hrs and Bactrim 1 DS q8hrs to complete 6 weeks course  -Foot MRSA was I to tetracycline, and patient is on chronic suppression with doxy for MRSA R hip PJI, If she tolerates Bactrim well with no side effects or labs abnormalities (electrolytes and kidney function), would do Bactrim 1 DS po daily for chronic suppression of R hip PJI instead of doxycycline   -Outpatient ID f/up with Dr Bethany Waters at [address] [place] [address] Dr [place], [place] within 4 weeks of discharge     Discussed with primary team     Althea Lozano MD  ID physician  [employer] Preferred  After 5pm/weekends call [phone]   79F w/ hx of ETOH in past, PAD w/ b/l carotid artery stenosis, HFpEF EF 64% w/ severe AS, COPD, HTN, CAD, RLE DVT 8/2023 w/ hx of R hip infection p/w worsening R hip pain. Patient is poor historian, not answering all questions she is being asked, continued requests for pain medication. Patient endorses being admitted to [facility] for multiple days for hip pain. States she did not receive antibiotics. Cannot specify nature of her admission. States she fell in front of [employer] before [place] admission but not since. States she was able to bear weight after leaving hospital around 3 days ago but pain has progressively worsened and does not think she can ambulate now. Denies fevers, chills or additional trauma. States she does not remember her medications and is not compliant with many of them outside of hypertension.     Workup:   -MRI R ankle: Lateral soft tissue ankle wound with osteitis of the adjacent fibula. This area is susceptible to developing osteomyelitis. No fluid collection.  -Xray R hip: Right longstem femoral revision hardware with lucency surrounding the hardware and marked subsidence in keeping with loosening.Lucency at the lateral cortex of the proximal femur may reflect postoperative change versus osteolysis.Pseudoarticulation between the proximal femur and right iliac bone  -CT angio chest: No pulmonary embolism. Right upper lobe groundglass opacities and right lower lobe nodular opacities are likely infectious/inflammatory. Small bilateral pleural effusions.  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   #Acute hypoxic respiratory failure, abnormal CT chest  #Recent fall     Recommendations:   -Continue Cefepime and Bactrim while inpatient   -Podiatry following - clinical concern for ankle OM but "No acute podiatric surgical intervention, OM extends above podiatric surgical scope"  -Orthopedics surgery following  - no intervention for R ankle osteitis/OM, offered Girdlestone procedure for R hip chronic PJI, however patient refused  -When stable for discharge, plan to transition to Levofloxacin 750 mg po q48hrs ( msec on 1/27) and Bactrim 1 DS po q8hrs to complete 6 weeks course (till 3/13)  -Foot MRSA was I to tetracycline, and patient is on chronic suppression with doxy for MRSA R hip PJI, If she tolerates Bactrim well with no side effects or labs abnormalities (electrolytes and kidney function), would do Bactrim 1 DS po daily for chronic suppression of R hip PJI instead of doxycycline   -Outpatient ID f/up with Dr Bethany Waters at [address] [place] [address] within 4 weeks of discharge     Discussed with primary team     Althea Lozano MD  ID physician  [employer] Preferred  After 5pm/weekends call [phone]   79F w/ hx of ETOH in past, PAD w/ b/l carotid artery stenosis, HFpEF EF 64% w/ severe AS, COPD, HTN, CAD, RLE DVT 8/2023 w/ hx of R hip infection p/w worsening R hip pain. Patient is poor historian, not answering all questions she is being asked, continued requests for pain medication. Patient endorses being admitted to [facility] for multiple days for hip pain. States she did not receive antibiotics. Cannot specify nature of her admission. States she fell in front of [employer] before [place] admission but not since. States she was able to bear weight after leaving hospital around 3 days ago but pain has progressively worsened and does not think she can ambulate now. Denies fevers, chills or additional trauma. States she does not remember her medications and is not compliant with many of them outside of hypertension.     Workup:   -MRI R ankle: Lateral soft tissue ankle wound with osteitis of the adjacent fibula. This area is susceptible to developing osteomyelitis. No fluid collection.  -Xray R hip: Right longstem femoral revision hardware with lucency surrounding the hardware and marked subsidence in keeping with loosening.Lucency at the lateral cortex of the proximal femur may reflect postoperative change versus osteolysis.Pseudoarticulation between the proximal femur and right iliac bone  -CT angio chest: No pulmonary embolism. Right upper lobe groundglass opacities and right lower lobe nodular opacities are likely infectious/inflammatory. Small bilateral pleural effusions.  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   #Acute hypoxic respiratory failure, abnormal CT chest  #Recent fall     Recommendations:   -Continue Cefepime and Bactrim while inpatient   -Podiatry following - clinical concern for ankle OM but "No acute podiatric surgical intervention, OM extends above podiatric surgical scope"  -Orthopedics surgery following  - no intervention for R ankle osteitis/OM, offered Girdlestone procedure for R hip chronic PJI, however patient refused  -When stable for discharge, plan to transition to Levofloxacin 750 mg po q48hrs ( msec on 1/27) and Bactrim 1 DS po q8hrs to complete 6 weeks course (till 3/13)  -Foot MRSA was I to tetracycline, and patient is on chronic suppression with doxy for MRSA R hip PJI, If she tolerates Bactrim well with no side effects or labs abnormalities (electrolytes and kidney function), would do Bactrim 1 DS po daily for chronic suppression of R hip PJI instead of doxycycline   -Outpatient ID f/up with Dr Bethany Waters at [address] [place] [address] within 4 weeks of discharge     ID signing off, please call if any questions or change in status    Discussed with primary team     Althea Lozano MD  ID physician  [employer] Teams Preferred  After 5pm/weekends call [phone]

## 2025-02-07 NOTE — PROGRESS NOTE ADULT - ASSESSMENT
79F w/ hx of ETOH in past, PAD w/ b/l carotid artery stenosis, HFpEF EF 64% w/ severe AS, COPD, HTN, CAD, RLE DVT 8/2023 w/ hx of R hip infection p/w worsening R hip pain. Found to have OM, infected hardware, with multiple orgnaisms. Over her stay her Oxygen and dyspnea have somewhat worsened.     #Acute onchornic hypoxic respiratory failure: progression of PNA  #COPD   Today spent a while explaining the use of her devices and importance of airway clearance   Reccs:  -- Continue with Advair, and duonebs q6 can change to prn  - C/w 3% and airway clearence device (Vibrapep) should be continued on d/c BID.   - Likely not in need of abx expansion as i suspect her current abx have been suppressing her PNA and she just needs airway clearance can expand if develops sepsis.   - GOC disucssed with patient and she deffers to her kids. explained to her and the siblings that patiens in her situation are likely to have a continual decline due to chronic infection and immobility as wells as her underlying cardiopulmonary disease and that while I fully expect her to recover from this her longer term prognosis is quite poor. They will discuss it together.  Please request "HOME Pulmonary Follow-up" in the discharge token or Email [email or web address] to arrange televisit two days after discharge, with outpatient pulmonary follow-up afterwards.   Reconsult as needed

## 2025-02-07 NOTE — PROGRESS NOTE ADULT - SUBJECTIVE AND OBJECTIVE BOX
Cardiovascular Disease Progress Note    Overnight events: No acute events overnight.  no new cardiac sx  Otherwise review of systems negative    Objective Findings:  T(C): 36.7 (02-07-25 @ 05:28), Max: 37.2 (02-06-25 @ 20:04)  HR: 66 (02-07-25 @ 05:28) (66 - 85)  BP: 126/67 (02-07-25 @ 05:28) (118/56 - 157/70)  RR: 18 (02-07-25 @ 05:28) (18 - 18)  SpO2: 97% (02-07-25 @ 05:28) (90% - 97%)  Wt(kg): --  Daily     Daily       Physical Exam:  Gen: NAD  HEENT: EOMI  CV: RRR, normal S1 + S2, no m/r/g  Lungs: CTAB  Abd: soft, non-tender  Ext: No edema    Telemetry:    Laboratory Data:                        10.0   5.66  )-----------( 287      ( 06 Feb 2025 07:29 )             33.3     02-06    134[L]  |  98  |  40[H]  ----------------------------<  91  5.2   |  27  |  1.27    Ca    8.8      06 Feb 2025 07:26                Inpatient Medications:  MEDICATIONS  (STANDING):  albuterol/ipratropium for Nebulization 3 milliLiter(s) Nebulizer every 6 hours  atorvastatin 40 milliGRAM(s) Oral at bedtime  cefepime   IVPB 1000 milliGRAM(s) IV Intermittent every 8 hours  chlorhexidine 2% Cloths 1 Application(s) Topical daily  cloNIDine 0.1 milliGRAM(s) Oral every 12 hours  collagenase Ointment 1 Application(s) Topical daily  fluticasone propionate/ salmeterol 250-50 MICROgram(s) Diskus 1 Dose(s) Inhalation two times a day  furosemide    Tablet 20 milliGRAM(s) Oral daily  gabapentin 400 milliGRAM(s) Oral two times a day  morphine ER Tablet 15 milliGRAM(s) Oral every 12 hours  polyethylene glycol 3350 17 Gram(s) Oral daily  rivaroxaban 2.5 milliGRAM(s) Oral two times a day  senna 2 Tablet(s) Oral at bedtime  sodium chloride 3%  Inhalation 4 milliLiter(s) Inhalation every 6 hours  thiamine 100 milliGRAM(s) Oral daily  tiotropium 2.5 MICROgram(s) Inhaler 2 Puff(s) Inhalation daily  trimethoprim  160 mG/sulfamethoxazole 800 mG 2 Tablet(s) Oral two times a day      Assessment:  79F w/ hx of ETOH in past, PAD w/ b/l carotid artery stenosis, HFpEF EF 64% w/ severe AS, COPD, HTN, CAD, RLE DVT 8/2023 w/ hx of R hip infection p/w worsening R hip pain and hypoxic resp failure    Recs:  cardiac stable  no e/o acs  cw antiplatelet, statin and antianginals for cad/stent. denies chest pain. hold off on ischemic eval for now   vol status stable =cw lasix to 20mg po qd. gentle diuresis as patient is preload dependent in setting of AS.   s/p echo, results noted. mod to severe AS. doubt would be a TAVR candidate in setting of chronic hip infection and risk of endocarditis  tx for copd per pulmonary  cw xarelto 2.5mg bid for PAD dosing and "ppx for hx of VTE"   pain control  abx per ID  f/u ortho recs  dcp            Over 25 minutes spent on total encounter; more than 50% of the visit was spent counseling and/or coordinating care by the attending physician.      Chaka Lawrence MD   Cardiovascular Disease  [phone]

## 2025-02-07 NOTE — PROGRESS NOTE ADULT - ASSESSMENT
_________________________________________________________________________________________  ========>>  M E D I C A L   A T T E N D I N G    F O L L O W  U P  N O T E  <<=========  -----------------------------------------------------------------------------------------------------    - Patient seen and examined by me earlier today.   - Patient today overall the same      patient more amenable to rehabilitation     ==================>> REVIEW OF SYSTEM <<=================    GEN: no fever, no chills,   RESP: no SOB, no cough, no sputum  CVS: no chest pain, no palpitations  GI: no abdominal pain, no nausea  : no dysuria, no frequency  Neuro: no headache, no dizziness    ==================>> PHYSICAL EXAM <<=================    GEN: A&O X 3 , NAD , comfortable, as above .. in bed .. encouraged out of bed to chair with assistance as needed.   HEENT: NCAT, PERRL, MMM, hearing intact  CVS: S1S2 , regular , No M/R/G appreciated  PULM: scattered wheez >> decreased / improved   ABD.: soft. non tender, non distended,  Extrem: intact pulses , leg edema decreased : stasis changes , wounds dressed        ( Note written / Date of service 02-07-25 ( This is certified to be the same as "ENTERED" date above ( for billing purposes)))    ==================>> MEDICATIONS <<====================    albuterol/ipratropium for Nebulization 3 milliLiter(s) Nebulizer every 6 hours  atorvastatin 40 milliGRAM(s) Oral at bedtime  cefepime   IVPB 1000 milliGRAM(s) IV Intermittent every 8 hours  chlorhexidine 2% Cloths 1 Application(s) Topical daily  cloNIDine 0.1 milliGRAM(s) Oral every 12 hours  collagenase Ointment 1 Application(s) Topical daily  fluticasone propionate/ salmeterol 250-50 MICROgram(s) Diskus 1 Dose(s) Inhalation two times a day  furosemide    Tablet 20 milliGRAM(s) Oral daily  gabapentin 400 milliGRAM(s) Oral two times a day  morphine ER Tablet 15 milliGRAM(s) Oral every 12 hours  polyethylene glycol 3350 17 Gram(s) Oral daily  rivaroxaban 2.5 milliGRAM(s) Oral two times a day  senna 2 Tablet(s) Oral at bedtime  sodium chloride 3%  Inhalation 4 milliLiter(s) Inhalation every 6 hours  thiamine 100 milliGRAM(s) Oral daily  tiotropium 2.5 MICROgram(s) Inhaler 2 Puff(s) Inhalation daily  trimethoprim  160 mG/sulfamethoxazole 800 mG 2 Tablet(s) Oral two times a day    MEDICATIONS  (PRN):  acetaminophen     Tablet .. 650 milliGRAM(s) Oral every 6 hours PRN Temp greater or equal to 38C (100.4F), Mild Pain (1 - 3)  melatonin 3 milliGRAM(s) Oral at bedtime PRN Insomnia  naloxone Injectable 0.2 milliGRAM(s) IV Push every 3 minutes PRN respiratory depression/ suspected opioid OD  nicotine  Polacrilex Gum 4 milliGRAM(s) Oral four times a day PRN Smoking Cessation  ondansetron Injectable 4 milliGRAM(s) IV Push every 8 hours PRN Nausea and/or Vomiting  oxyCODONE    IR 5 milliGRAM(s) Oral every 4 hours PRN Severe Pain (7 - 10)    ___________  Active diet:  Diet, DASH/TLC:   Sodium & Cholesterol Restricted  ___________________    ==================>> VITAL SIGNS <<==================    Vital Signs Last 24 HrsT(C): 36.7 (02-07-25 @ 05:28)  T(F): 98 (02-07-25 @ 05:28), Max: 98.9 (02-06-25 @ 20:04)  HR: 66 (02-07-25 @ 05:28) (66 - 85)  BP: 126/67 (02-07-25 @ 05:28)  RR: 18 (02-07-25 @ 05:28) (18 - 18)  SpO2: 97% (02-07-25 @ 05:28) (90% - 97%)      ==================>> LAB AND IMAGING <<==================                        10.0   5.66  )-----------( 287      ( 06 Feb 2025 07:29 )             33.3        02-06    134[L]  |  98  |  40[H]  ----------------------------<  91  5.2   |  27  |  1.27    Ca    8.8      06 Feb 2025 07:26      WBC count:   5.66 <<== ,  5.86 <<==   Hemoglobin:   10.0 <<==,  10.7 <<==  platelets:  287 <==, 276 <==, 260 <==    Creatinine:  1.27  <<==, 1.15  <<==, 0.88  <<==  Sodium:   134  <==, 135  <==, 142  <==       AST:          14(01-30) <== , 15(01-27) <== , 24(01-27) <==      ALT:        8(01-30)  <== , 11(01-27)  <== , 15(01-27)  <==      AP:        80(01-30)  <=, 109(01-27)  <=, 121(01-27)  <=     Bili:        0.4(01-30)  <=, 0.3(01-27)  <=, 0.3(01-27)  <=    ____________________________    M I C R O B I O L O G Y :    Culture - Wound Aerobic/Anaerobic (collected 29 Jan 2025 07:19)  Source: Skin/Wound  Final Report (03 Feb 2025 07:50):    Numerous Pseudomonas aeruginosa    Rare Methicillin Resistant Staphylococcus aureus    Rare Stenotrophomonas maltophilia  Organism: Pseudomonas aeruginosa  Methicillin resistant Staphylococcus aureus  Stenotrophomonas maltophilia (03 Feb 2025 07:50)  Organism: Stenotrophomonas maltophilia (03 Feb 2025 07:50)    Sensitivities:      Method Type: KB      -  Minocycline: S  Organism: Stenotrophomonas maltophilia (03 Feb 2025 07:50)    Sensitivities:      Method Type: DEVAN      -  Levofloxacin: S 1      -  Trimethoprim/Sulfamethoxazole: S <=0.5/9.5  Organism: Methicillin resistant Staphylococcus aureus (03 Feb 2025 07:50)    Sensitivities:      Method Type: DEVAN      -  Clindamycin: S <=0.25      -  Daptomycin: S 1      -  Erythromycin: R >4      -  Gentamicin: S <=4 Should not be used as monotherapy      -  Linezolid: S 2      -  Oxacillin: R >2      -  Penicillin: R >2      -  Rifampin: S <=1 Should not be used as monotherapy      -  Tetracycline: I 8      -  Trimethoprim/Sulfamethoxazole: S <=0.5/9.5      -  Vancomycin: S 1  Organism: Pseudomonas aeruginosa (03 Feb 2025 07:50)    Sensitivities:      Method Type: DEVAN      -  Amikacin: S <=16      -  Aztreonam: S <=4      -  Cefepime: S <=2      -  Ceftazidime: S 4      -  Ciprofloxacin: S <=0.25      -  Imipenem: S <=1      -  Levofloxacin: S <=0.5      -  Meropenem: S <=1      -  Piperacillin/Tazobactam: S <=8    Culture - Wound Aerobic/Anaerobic (collected 29 Jan 2025 07:19)  Source: Skin/Wound  Final Report (03 Feb 2025 13:06):    Moderate Methicillin Resistant Staphylococcus aureus    Rare Stenotrophomonas maltophilia See previous culture [id number]    for susceptibility  Organism: Methicillin resistant Staphylococcus aureus (03 Feb 2025 13:06)  Organism: Methicillin resistant Staphylococcus aureus (03 Feb 2025 13:06)    Sensitivities:      Method Type: DEVAN      -  Clindamycin: S <=0.25      -  Daptomycin: S 1      -  Erythromycin: R >4      -  Gentamicin: S <=4 Should not be used as monotherapy      -  Linezolid: S 2      -  Oxacillin: R >2      -  Penicillin: R >2      -  Rifampin: S <=1 Should not be used as monotherapy      -  Tetracycline: I 8      -  Trimethoprim/Sulfamethoxazole: S <=0.5/9.5      -  Vancomycin: S 1            < from: TTE W or WO Ultrasound Enhancing Agent (01.28.25 @ 13:55) >  CONCLUSIONS:    1. Left ventricular cavity is small. Left ventricular wall thickness is normal. Left ventricular systolic function is normal with an ejection fraction of 73 % by Rios's method of disks.   2. Mildly enlarged right ventricular cavity size, with normal wall thickness, and normal right ventricular systolic function.   3. Moderate to severe aortic stenosis.   4. Moderate tricuspid regurgitation.   5. Estimated pulmonary artery systolic pressure is 75 mmHg, consistent with severe pulmonary hypertension.   6. No pericardial effusion seen.   7. Compared to the transthoracic echocardiogram performed on 4/12/2023, there have been no significant interval changes.  < end of copied text >    < from: CT Angio Chest PE Protocol w/ IV Cont (01.29.25 @ 08:47) >  IMPRESSION:  No pulmonary embolism.  Right upper lobe groundglass opacities and right lower lobe nodular   opacities are likely infectious/inflammatory.  Small bilateral pleural effusions.  < end of copied text >    < from: [facility] Duplex Lower Ext Vein Scan, Bilat (01.27.25 @ 20:06) >  IMPRESSION:  No evidence of deep venous thrombosis in either lower extremity.  < end of copied text >    < from: Xray Knee 3 Views, Right (01.27.25 @ 23:18) >  IMPRESSION:  1.  Right longstem femoral revision hardware with lucency surrounding the   hardware and marked subsidence in keeping with loosening.  2.  Lucency at the lateral cortex of the proximal femur may reflect   postoperative change versus osteolysis.  3.  Pseudoarticulation between the proximal femur and right iliac bone  < end of copied text >    < from: VA Duplex Lower Ext Vein Scan, Bilat (01.27.25 @ 20:06) >  IMPRESSION:  No evidence of deep venous thrombosis in either lower extremity.  < end of copied text >    < from: TTE W or WO Ultrasound Enhancing Agent (06.17.24 @ 17:12) >  CONCLUSIONS:    1. Left ventricular cavity is normal in size. Left ventricular wall thickness is normal. Left ventricular systolic function is normal with an ejection fraction of 64 % by Rios's method of disks. There are no regional wall motion abnormalities seen.   2. There is mild (grade 1) left ventricular diastolic dysfunction.   3. Normal right ventricular cavity size and normal systolic function.   4. Structurally normal mitral valve with normal leaflet excursion. There is calcification of the mitral valve annulus. There is mild mitral regurgitation.   5. The aortic valve anatomy cannot be determined with reduced systolic excursion. There is calcification of the aortic valve leaflets. There is severe aortic stenosis. The peak transaortic velocity is 3.94 m/s, peak transaortic gradient is 62.1 mmHg and mean transaortic gradient is 32.0 mmHg with an LVOT/aortic valve VTI ratio of 0.22. The aorticvalve area is estimated at 0.51 cm² by the continuity equation. There is mild to moderate aortic regurgitation.   6. The left atrium is mildly dilated with an indexed volume of 41 ml/m².   7. No prior echocardiogram is available for comparison.  < end of copied text >    ___________________________________________________________________________________  ===============>>  A S S E S S M E N T   A N D   P L A N <<===============  ------------------------------------------------------------------------------------------    79F w/ hx of ETOH in past, PAD w/ b/l carotid artery stenosis, HFpEF EF 64% w/ severe AS, COPD, HTN, CAD, RLE DVT 8/2023 w/ hx of R hip infection p/w worsening R hip pain     Problem/Plan - 1:  ·  Problem: right hip pain.   ·  Plan: Patient endorsing severe R hip pain w/o inciting fall or traumatic event. 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 06/2024 admission with possible joint infection. No clear signs of infection currently and exam is equivocal. -Pain control as ordered [noting there is some substance use history]     >> pain management follow up and further management            Bowel regimen as needed   -cultures as above   - Ortho following , noted   - antibiotics  per ID: ID follow up > duration of therapy..  PICC..     ## additional osteomyelitis of ankle     podiatry following, appreciated    ID following for antibiotics Rx >> will need long term antibiotics Rx ( patient at this time not interested in going to rehabilitation but unclear if she can take of administration of antibiotics at home ! )     otherwise as above    local wound care     Problem/Plan - 2:  ·  Problem: acute exacerbation of Chronic heart failure with preserved ejection fraction (HFpEF), edema, elevated BNP     in patient with severe Aortic stenosis as above   repeat TTE as above with AS, MR, PAH  diuretics per cardio ( patient declined some doses before)   -Daily weight and I/O  cardio following   Xarelto 2.5 mg BID per cardio    CTA and Duplex negative for VTE      Problem/Plan - 3:  ·  Problem: COPD   ? has 02 PRN at home. Patient smoking tobacco, not clarifying how much.  -Duonebs Q6hrs  -Cont. Symbicort BID  -Cont. Spiriva  - pulm appreciated     ## pneumonia on CT as above >> post antibiotics >> monitor     lung sounds and breathing better     ID on board     pulm appreciated      Problem/Plan - 4:  ·  Problem: CAD (coronary artery disease).   ·  Plan: Patient states she does not take aspirin or atorvastatin at home.  cardio following, appreciated     Problem/Plan - 5:  ·  Problem: EtOH dependence.   no signs of withdrawal      Problem/Plan - 6:  ·  Problem: history of Severe aortic stenosis.   repeat echo as above : unchanged   cardio appreciated     Problem/Plan - 7:  ·  Problem: Essential hypertension.   ·  Plan: -Trend BP  -Cont. Clonidine BID  -Patient does not recall taking Nifedipine, can resume prior dose if BPs uncontrolled.     Problem/Plan - 8:  ·  Problem: Prophylactic measure.   ·  Plan: DVT PPx  xarelto     --------------------------------------------  Case discussed with patient, , ACP,     Education given on findings and plan of care  ___________________________  H. GONZALO Perez.  Pager: [phone]

## 2025-02-07 NOTE — PROGRESS NOTE ADULT - SUBJECTIVE AND OBJECTIVE BOX
CHIEF COMPLAINT:    Interval Events:   No change in multiple complaints. Not SOb but has cough with difficulty expectorating.   [x] All other systems negative  [ ] Unable to assess ROS because ________    OBJECTIVE:  ICU Vital Signs Last 24 Hrs  T(C): 36.7 (07 Feb 2025 05:28), Max: 37.2 (06 Feb 2025 20:04)  T(F): 98 (07 Feb 2025 05:28), Max: 98.9 (06 Feb 2025 20:04)  HR: 66 (07 Feb 2025 05:28) (66 - 76)  BP: 126/67 (07 Feb 2025 05:28) (118/56 - 157/70)  BP(mean): --  ABP: --  ABP(mean): --  RR: 18 (07 Feb 2025 05:28) (18 - 18)  SpO2: 97% (07 Feb 2025 05:28) (90% - 97%)    O2 Parameters below as of 07 Feb 2025 05:28  Patient On (Oxygen Delivery Method): nasal cannula  O2 Flow (L/min): 5            02-06 @ 07:01  -  02-07 @ 07:00  --------------------------------------------------------  IN: 0 mL / OUT: 1500 mL / NET: -1500 mL      CAPILLARY BLOOD GLUCOSE          PHYSICAL EXAM:  General:  nad  Neck: no jvd  Respiratory: Signifincatly improved with less ronchi and good air enrtry through out.  No distress  Cardiovascular: S1 S2 RRR  Abdomen: Soft NT ND  Extremities: wounds dressed, some residual edema on wrikcled LEs.   Skin: multiple wounds.   Neurological:  Psychiatry: Anxious and tangential     HOSPITAL MEDICATIONS:  Standing Meds:  albuterol/ipratropium for Nebulization 3 milliLiter(s) Nebulizer every 6 hours  atorvastatin 40 milliGRAM(s) Oral at bedtime  cefepime   IVPB 1000 milliGRAM(s) IV Intermittent every 8 hours  chlorhexidine 2% Cloths 1 Application(s) Topical daily  cloNIDine 0.1 milliGRAM(s) Oral every 12 hours  collagenase Ointment 1 Application(s) Topical daily  fluticasone propionate/ salmeterol 250-50 MICROgram(s) Diskus 1 Dose(s) Inhalation two times a day  furosemide    Tablet 20 milliGRAM(s) Oral daily  gabapentin 400 milliGRAM(s) Oral two times a day  morphine ER Tablet 15 milliGRAM(s) Oral every 12 hours  polyethylene glycol 3350 17 Gram(s) Oral daily  rivaroxaban 2.5 milliGRAM(s) Oral two times a day  senna 2 Tablet(s) Oral at bedtime  sodium chloride 3%  Inhalation 4 milliLiter(s) Inhalation every 6 hours  thiamine 100 milliGRAM(s) Oral daily  tiotropium 2.5 MICROgram(s) Inhaler 2 Puff(s) Inhalation daily  trimethoprim  160 mG/sulfamethoxazole 800 mG 2 Tablet(s) Oral two times a day      PRN Meds:  acetaminophen     Tablet .. 650 milliGRAM(s) Oral every 6 hours PRN  melatonin 3 milliGRAM(s) Oral at bedtime PRN  naloxone Injectable 0.2 milliGRAM(s) IV Push every 3 minutes PRN  nicotine  Polacrilex Gum 4 milliGRAM(s) Oral four times a day PRN  ondansetron Injectable 4 milliGRAM(s) IV Push every 8 hours PRN  oxyCODONE    IR 5 milliGRAM(s) Oral every 4 hours PRN      LABS:                        10.0   5.66  )-----------( 287      ( 06 Feb 2025 07:29 )             33.3     Hgb Trend: 10.0<--, 10.7<--, 10.7<--  02-06    134[L]  |  98  |  40[H]  ----------------------------<  91  5.2   |  27  |  1.27    Ca    8.8      06 Feb 2025 07:26      Creatinine Trend: 1.27<--, 1.15<--, 0.88<--, 0.84<--, 1.08<--, 0.93<--    Urinalysis Basic - ( 06 Feb 2025 07:26 )    Color: x / Appearance: x / SG: x / pH: x  Gluc: 91 mg/dL / Ketone: x  / Bili: x / Urobili: x   Blood: x / Protein: x / Nitrite: x   Leuk Esterase: x / RBC: x / WBC x   Sq Epi: x / Non Sq Epi: x / Bacteria: x            MICROBIOLOGY:     RADIOLOGY:  [ ] Reviewed and interpreted by me    PULMONARY FUNCTION TESTS:    EKG:

## 2025-02-07 NOTE — PROGRESS NOTE ADULT - SUBJECTIVE AND OBJECTIVE BOX
Follow Up:      Interval History/ROS:Patient is a 79y old  Female who presents with a chief complaint of Worsening R hip pain (07 Feb 2025 14:03)  No new events, remains on 4 L NC.     Allergies  No Known Allergies        ANTIMICROBIALS:    cefepime   IVPB 1000 every 8 hours  trimethoprim  160 mG/sulfamethoxazole 800 mG 2 two times a day        OTHER MEDS: MEDICATIONS  (STANDING):  acetaminophen     Tablet .. 650 every 6 hours PRN  acetaminophen   IVPB .. 1000 once  albuterol/ipratropium for Nebulization 3 every 6 hours  atorvastatin 40 at bedtime  cloNIDine 0.1 every 12 hours  fluticasone propionate/ salmeterol 250-50 MICROgram(s) Diskus 1 two times a day  furosemide    Tablet 20 daily  gabapentin 400 two times a day  melatonin 3 at bedtime PRN  morphine ER Tablet 15 every 12 hours  ondansetron Injectable 4 every 8 hours PRN  oxyCODONE    IR 5 every 4 hours PRN  polyethylene glycol 3350 17 daily  rivaroxaban 2.5 two times a day  senna 2 at bedtime  sodium chloride 3%  Inhalation 4 every 6 hours  tiotropium 2.5 MICROgram(s) Inhaler 2 daily      Vital Signs Last 24 Hrs  T(F): 97.3 (02-07-25 @ 12:45), Max: 99.2 (02-03-25 @ 23:50)    Vital Signs Last 24 Hrs  HR: 70 (02-07-25 @ 17:19) (66 - 78)  BP: 105/57 (02-07-25 @ 17:19) (105/57 - 157/70)  RR: 18 (02-07-25 @ 12:45)  SpO2: 90% (02-07-25 @ 12:45) (90% - 97%)  Wt(kg): --    EXAM:    GA: NAD  CV: nl S1/S2, no RMG  Lungs:  no distress  Abd:  soft, nontender  Ext: no edema  Skin: Intact  IV: no phlebitis    Labs:                        10.0   5.66  )-----------( 287      ( 06 Feb 2025 07:29 )             33.3     02-06    134[L]  |  98  |  40[H]  ----------------------------<  91  5.2   |  27  |  1.27    Ca    8.8      06 Feb 2025 07:26        WBC Trend:  WBC Count: 5.66 (02-06-25 @ 07:29)  WBC Count: 5.86 (02-04-25 @ 06:59)      Creatine Trend:  Creatinine: 1.27 (02-06)  Creatinine: 1.15 (02-04)  Creatinine: 0.88 (02-02)      Liver Biochemical Testing Trend:  Alanine Aminotransferase (ALT/SGPT): 8 *L* (01-30)  Alanine Aminotransferase (ALT/SGPT): 11 (01-27)  Alanine Aminotransferase (ALT/SGPT): 15 (01-27)  Alanine Aminotransferase (ALT/SGPT): 7 (06-20)  Alanine Aminotransferase (ALT/SGPT): 10 (06-19)  Aspartate Aminotransferase (AST/SGOT): 14 (01-30-25 @ 07:13)  Aspartate Aminotransferase (AST/SGOT): 15 (01-27-25 @ 22:26)  Aspartate Aminotransferase (AST/SGOT): 24 (01-27-25 @ 16:35)  Aspartate Aminotransferase (AST/SGOT): 15 (06-20-24 @ 06:10)  Aspartate Aminotransferase (AST/SGOT): 12 (06-19-24 @ 07:09)  Bilirubin Total: 0.4 (01-30)  Bilirubin Total: 0.3 (01-27)  Bilirubin Total: 0.3 (01-27)  Bilirubin Total: 0.3 (06-20)  Bilirubin Total: 0.2 (06-19)      Trend LDH      Urinalysis Basic - ( 06 Feb 2025 07:26 )    Color: x / Appearance: x / SG: x / pH: x  Gluc: 91 mg/dL / Ketone: x  / Bili: x / Urobili: x   Blood: x / Protein: x / Nitrite: x   Leuk Esterase: x / RBC: x / WBC x   Sq Epi: x / Non Sq Epi: x / Bacteria: x        MICROBIOLOGY:  Vancomycin Level, Trough: 13.3 (02-02 @ 10:21)    MRSA PCR Result.: NotDetec (06-12-24 @ 14:54)      Culture - Blood (collected 27 Jan 2025 22:17)  Source: .Blood BLOOD  Final Report:    No growth at 5 days    Culture - Blood (collected 27 Jan 2025 22:10)  Source: .Blood BLOOD  Final Report:    No growth at 5 days    Culture - Fungal, Body Fluid (collected 11 Jun 2024 19:13)  Source: .Body Fluid RIGHT HIP ASPIRATION  Final Report:    No fungus isolated at 4 weeks.    Culture - Blood (collected 11 Jun 2024 06:44)  Source: .Blood Blood-Peripheral  Final Report:    No growth at 5 days    Culture - Blood (collected 11 Jun 2024 06:35)  Source: .Blood Blood-Peripheral  Final Report:    No growth at 5 days    Culture - Urine (collected 11 Jun 2024 01:33)  Source: Clean Catch Clean Catch (Midstream)  Final Report:    >=3 organisms. Probable collection contamination.    Culture - Urine (collected 17 May 2023 00:30)  Source: Clean Catch Clean Catch (Midstream)  Final Report:    <10,000 CFU/mL Normal Urogenital Mone    Culture - Blood (collected 16 May 2023 23:30)  Source: .Blood Blood-Venous  Final Report:    No Growth Final    Culture - Blood (collected 16 May 2023 23:17)  Source: .Blood Blood-Peripheral  Final Report:    No Growth Final    Culture - Blood (collected 12 Apr 2023 11:02)  Source: .Blood Blood-Venous  Final Report:    No Growth Final                                              Sedimentation Rate, Erythrocyte: 89 mm/hr (01-27-25 @ 22:26)  Sedimentation Rate, Erythrocyte: 74 mm/hr (06-12-24 @ 07:15)  Sedimentation Rate, Erythrocyte: 25 mm/hr (06-10-24 @ 21:28)      RADIOLOGY:  imaging below personally reviewed        < from: MR Ankle w/wo IV Cont, Right (01.29.25 @ 18:54) >  IMPRESSION:  Lateral soft tissue ankle wound with osteitis of the adjacent fibula.   This area is susceptible to developing osteomyelitis.  No fluid collection.        --- End of Report ---    < end of copied text >

## 2025-02-08 LAB
D DIMER BLD IA.RAPID-MCNC: 1017 NG/ML DDU — HIGH
GAS PNL BLDV: SIGNIFICANT CHANGE UP
GLUCOSE BLDC GLUCOMTR-MCNC: 105 MG/DL — HIGH (ref 70–99)
GRAM STN FLD: ABNORMAL
LACTATE SERPL-SCNC: 0.9 MMOL/L — SIGNIFICANT CHANGE UP (ref 0.5–2)
SPECIMEN SOURCE: SIGNIFICANT CHANGE UP

## 2025-02-08 RX ORDER — QUETIAPINE FUMARATE 25 MG/1
12.5 TABLET ORAL ONCE
Refills: 0 | Status: COMPLETED | OUTPATIENT
Start: 2025-02-08 | End: 2025-02-08

## 2025-02-08 RX ADMIN — RIVAROXABAN 2.5 MILLIGRAM(S): 10 TABLET, FILM COATED ORAL at 17:03

## 2025-02-08 RX ADMIN — POLYETHYLENE GLYCOL 3350 17 GRAM(S): 17 POWDER, FOR SOLUTION ORAL at 12:29

## 2025-02-08 RX ADMIN — Medication 15 MILLIGRAM(S): at 05:53

## 2025-02-08 RX ADMIN — IPRATROPIUM BROMIDE AND ALBUTEROL SULFATE 3 MILLILITER(S): .5; 2.5 SOLUTION RESPIRATORY (INHALATION) at 12:29

## 2025-02-08 RX ADMIN — QUETIAPINE FUMARATE 12.5 MILLIGRAM(S): 25 TABLET ORAL at 20:59

## 2025-02-08 RX ADMIN — ATORVASTATIN CALCIUM 40 MILLIGRAM(S): 80 TABLET, FILM COATED ORAL at 20:59

## 2025-02-08 RX ADMIN — Medication 1 DOSE(S): at 05:55

## 2025-02-08 RX ADMIN — IPRATROPIUM BROMIDE AND ALBUTEROL SULFATE 3 MILLILITER(S): .5; 2.5 SOLUTION RESPIRATORY (INHALATION) at 00:50

## 2025-02-08 RX ADMIN — CEFEPIME 100 MILLIGRAM(S): 2 INJECTION, POWDER, FOR SOLUTION INTRAVENOUS at 20:59

## 2025-02-08 RX ADMIN — Medication 3 MILLIGRAM(S): at 21:00

## 2025-02-08 RX ADMIN — CEFEPIME 100 MILLIGRAM(S): 2 INJECTION, POWDER, FOR SOLUTION INTRAVENOUS at 05:54

## 2025-02-08 RX ADMIN — CEFEPIME 100 MILLIGRAM(S): 2 INJECTION, POWDER, FOR SOLUTION INTRAVENOUS at 14:46

## 2025-02-08 RX ADMIN — Medication 400 MILLIGRAM(S): at 01:30

## 2025-02-08 RX ADMIN — Medication 1 APPLICATION(S): at 11:54

## 2025-02-08 RX ADMIN — GABAPENTIN 400 MILLIGRAM(S): 400 CAPSULE ORAL at 17:03

## 2025-02-08 RX ADMIN — IPRATROPIUM BROMIDE AND ALBUTEROL SULFATE 3 MILLILITER(S): .5; 2.5 SOLUTION RESPIRATORY (INHALATION) at 05:54

## 2025-02-08 RX ADMIN — Medication 2 TABLET(S): at 05:53

## 2025-02-08 RX ADMIN — FUROSEMIDE 20 MILLIGRAM(S): 10 INJECTION INTRAMUSCULAR; INTRAVENOUS at 05:54

## 2025-02-08 RX ADMIN — Medication 15 MILLIGRAM(S): at 17:03

## 2025-02-08 RX ADMIN — Medication 1 DOSE(S): at 17:05

## 2025-02-08 RX ADMIN — IPRATROPIUM BROMIDE AND ALBUTEROL SULFATE 3 MILLILITER(S): .5; 2.5 SOLUTION RESPIRATORY (INHALATION) at 17:04

## 2025-02-08 RX ADMIN — Medication 0.1 MILLIGRAM(S): at 17:03

## 2025-02-08 RX ADMIN — GABAPENTIN 400 MILLIGRAM(S): 400 CAPSULE ORAL at 05:54

## 2025-02-08 RX ADMIN — Medication 4 MILLILITER(S): at 06:30

## 2025-02-08 RX ADMIN — Medication 2 TABLET(S): at 17:04

## 2025-02-08 RX ADMIN — Medication 4 MILLILITER(S): at 02:09

## 2025-02-08 RX ADMIN — OXYCODONE HYDROCHLORIDE 5 MILLIGRAM(S): 30 TABLET ORAL at 16:31

## 2025-02-08 RX ADMIN — OXYCODONE HYDROCHLORIDE 5 MILLIGRAM(S): 30 TABLET ORAL at 20:59

## 2025-02-08 RX ADMIN — Medication 1 APPLICATION(S): at 11:53

## 2025-02-08 RX ADMIN — OXYCODONE HYDROCHLORIDE 5 MILLIGRAM(S): 30 TABLET ORAL at 12:29

## 2025-02-08 RX ADMIN — OXYCODONE HYDROCHLORIDE 5 MILLIGRAM(S): 30 TABLET ORAL at 21:30

## 2025-02-08 RX ADMIN — TIOTROPIUM BROMIDE INHALATION SPRAY 2 PUFF(S): 3.12 SPRAY, METERED RESPIRATORY (INHALATION) at 12:30

## 2025-02-08 RX ADMIN — Medication 0.1 MILLIGRAM(S): at 05:54

## 2025-02-08 RX ADMIN — RIVAROXABAN 2.5 MILLIGRAM(S): 10 TABLET, FILM COATED ORAL at 05:54

## 2025-02-08 NOTE — PROGRESS NOTE ADULT - ASSESSMENT
79F        hx of ETOH in past, PAD w/ b/l carotid artery stenosis      HFpEF EF 64% w/ severe AS, COPD, HTN, CAD, RLE DVT 8/2023 w/ hx of R hip infection        p/w worsening R hip pain and hypoxic resp failure     cad/stent. denies chest pain. hold off on ischemic eval for now, per    xard      lasix to 20mg po qd      echo,   mod to severe AS.     per  card  doubt would be a TAVR candidate in setting of chronic hip infection and risk of endocarditis    copd.  rx  per pulmonary   PAD,        cw xarelto 2.5mg bid for PAD dose     MRI R ankle: Lateral soft tissue ankle wound with osteitis of the adjacent fibula. This area is susceptible to developing osteomyelitis. No fluid collection.  -Xray R hip: Right longstem femoral revision hardware with lucency surrounding the hardware and marked subsidence in keeping with loosening.Lucency at the lateral cortex of the proximal femur may reflect postoperative change versus osteolysis.Pseudoarticulation between the proximal femur and right iliac bone    CT angio chest: No pulmonary embolism. Right upper lobe groundglass opacities and right lower lobe nodular opacities are likely infectious/inflammatory. Small bilateral pleural effusions.   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 Left ventricular cavity is small. Left ventricular wall thickness is normal. Left ventricular systolic function is normal with an ejection fraction of 73 % by Rios's method of disks.   2. Mildly enlarged right ventricular cavity size, with normal wall thickness, and normal right ventricular systolic function.   3. Moderate to severe aortic stenosis.   4. Moderate tricuspid regurgitation.   5. Estimated pulmonary artery systolic pressure is 75 mmHg, consistent with severe pulmonary hypertension.   6. No pericardial effusion seen.   7. Compared to the transthoracic echocardiogram performed on 4/12/2023, there have been no significant interval changes.  _________________________________    <

## 2025-02-08 NOTE — CHART NOTE - NSCHARTNOTEFT_GEN_A_CORE
Notified by RN; patient more confused Notified by RN; patient more confused and is desaturating to low 90's' Pt seen and examined at bedside. Pt is alert; no acute distress. Physical examination benign. VSS as charted.  Will order ABG / lactate / d.dimer / finger stick. Will monitor     Ayesha Haider NP  Department of Medicine   Spectra [phone]

## 2025-02-08 NOTE — PROGRESS NOTE ADULT - SUBJECTIVE AND OBJECTIVE BOX
date of service: 02-08-25 @ 13:24  [facility]  REVIEW OF SYSTEMS:  CONSTITUTIONAL: No fever,  no  weight loss  ENT:  No  tinnitus,   no   vertigo  NECK: No pain or stiffness  RESPIRATORY: No cough, wheezing, chills or hemoptysis;    No Shortness of Breath  CARDIOVASCULAR: No chest pain, palpitations, dizziness  GASTROINTESTINAL: No abdominal or epigastric pain. No nausea, vomiting, or hematemesis; No diarrhea  No melena or hematochezia.  GENITOURINARY: No dysuria, frequency, hematuria, or incontinence  NEUROLOGICAL: No headaches  SKIN: No itching,  no   rash  LYMPH Nodes: No enlarged glands  ENDOCRINE: No heat or cold intolerance  MUSCULOSKELETAL: No joint pain or swelling  PSYCHIATRIC: No depression, anxiety  HEME/LYMPH: No easy bruising, or bleeding gums  ALLERGY AND IMMUNOLOGIC: No hives or eczema	    MEDICATIONS  (STANDING):  albuterol/ipratropium for Nebulization 3 milliLiter(s) Nebulizer every 6 hours  atorvastatin 40 milliGRAM(s) Oral at bedtime  cefepime   IVPB 1000 milliGRAM(s) IV Intermittent every 8 hours  chlorhexidine 2% Cloths 1 Application(s) Topical daily  cloNIDine 0.1 milliGRAM(s) Oral every 12 hours  collagenase Ointment 1 Application(s) Topical daily  fluticasone propionate/ salmeterol 250-50 MICROgram(s) Diskus 1 Dose(s) Inhalation two times a day  furosemide    Tablet 20 milliGRAM(s) Oral daily  gabapentin 400 milliGRAM(s) Oral two times a day  morphine ER Tablet 15 milliGRAM(s) Oral every 12 hours  polyethylene glycol 3350 17 Gram(s) Oral daily  rivaroxaban 2.5 milliGRAM(s) Oral two times a day  senna 2 Tablet(s) Oral at bedtime  sodium chloride 3%  Inhalation 4 milliLiter(s) Inhalation every 6 hours  thiamine 100 milliGRAM(s) Oral daily  tiotropium 2.5 MICROgram(s) Inhaler 2 Puff(s) Inhalation daily  trimethoprim  160 mG/sulfamethoxazole 800 mG 2 Tablet(s) Oral two times a day    MEDICATIONS  (PRN):  acetaminophen     Tablet .. 650 milliGRAM(s) Oral every 6 hours PRN Temp greater or equal to 38C (100.4F), Mild Pain (1 - 3)  melatonin 3 milliGRAM(s) Oral at bedtime PRN Insomnia  naloxone Injectable 0.2 milliGRAM(s) IV Push every 3 minutes PRN respiratory depression/ suspected opioid OD  nicotine  Polacrilex Gum 4 milliGRAM(s) Oral four times a day PRN Smoking Cessation  ondansetron Injectable 4 milliGRAM(s) IV Push every 8 hours PRN Nausea and/or Vomiting  oxyCODONE    IR 5 milliGRAM(s) Oral every 4 hours PRN Severe Pain (7 - 10)      Vital Signs Last 24 Hrs  T(C): 36.7 (08 Feb 2025 05:44), Max: 36.7 (07 Feb 2025 20:02)  T(F): 98.1 (08 Feb 2025 05:44), Max: 98.1 (08 Feb 2025 05:44)  HR: 76 (08 Feb 2025 05:44) (67 - 76)  BP: 128/66 (08 Feb 2025 05:44) (105/57 - 137/63)  BP(mean): --  RR: 18 (08 Feb 2025 05:44) (18 - 18)  SpO2: 92% (08 Feb 2025 05:44) (92% - 93%)    Parameters below as of 08 Feb 2025 05:44  Patient On (Oxygen Delivery Method): nasal cannula  O2 Flow (L/min): 5    CAPILLARY BLOOD GLUCOSE      POCT Blood Glucose.: 105 mg/dL (08 Feb 2025 02:42)    I&O's Summary    07 Feb 2025 07:01  -  08 Feb 2025 07:00  --------------------------------------------------------  IN: 0 mL / OUT: 650 mL / NET: -650 mL          Appearance: Normal	  HEENT:   Normal oral mucosa, PERRL, EOMI	  Lymphatic: No lymphadenopathy  Cardiovascular: Normal S1 S2, No JVD  Respiratory: Lungs clear to auscultation	  Gastrointestinal:  Soft, Non-tender, + BS	  Skin: No rash, No ecchymoses	  Extremities:     LABS:                  Lactate, Blood: 0.9 mmol/L (02-08 @ 02:35)      02-08 @ 02:35  5.8  40            Culture - Sputum (collected 02-07-25 @ 18:19)  Source: .Sputum Sputum  Gram Stain (02-08-25 @ 06:20):    Numerous polymorphonuclear leukocytes per low power field    Rare Squamous epithelial cells per low power field    Numerous Gram Negative Rods seen per oil power field    Moderate Gram positive cocci in pairs seen per oil power field    Moderate Yeast likecells seen per oil power field    Few Gram Positive Rods seen per oil power field        Consultant(s) Notes Reviewed:      Care Discussed with Consultants/Other Providers:

## 2025-02-08 NOTE — PROGRESS NOTE ADULT - SUBJECTIVE AND OBJECTIVE BOX
Cardiovascular Disease Progress Note    Overnight events: No acute events overnight.  no new cardiac sx  Otherwise review of systems negative    Objective Findings:  T(C): 36.7 (02-08-25 @ 05:44), Max: 36.7 (02-07-25 @ 20:02)  HR: 76 (02-08-25 @ 05:44) (67 - 78)  BP: 128/66 (02-08-25 @ 05:44) (105/57 - 137/63)  RR: 18 (02-08-25 @ 05:44) (18 - 18)  SpO2: 92% (02-08-25 @ 05:44) (90% - 93%)  Wt(kg): --  Daily     Daily       Physical Exam:  Gen: NAD  HEENT: EOMI  CV: RRR, normal S1 + S2, no m/r/g  Lungs: CTAB  Abd: soft, non-tender  Ext: No edema    Telemetry:    Laboratory Data:                    Inpatient Medications:  MEDICATIONS  (STANDING):  albuterol/ipratropium for Nebulization 3 milliLiter(s) Nebulizer every 6 hours  atorvastatin 40 milliGRAM(s) Oral at bedtime  cefepime   IVPB 1000 milliGRAM(s) IV Intermittent every 8 hours  chlorhexidine 2% Cloths 1 Application(s) Topical daily  cloNIDine 0.1 milliGRAM(s) Oral every 12 hours  collagenase Ointment 1 Application(s) Topical daily  fluticasone propionate/ salmeterol 250-50 MICROgram(s) Diskus 1 Dose(s) Inhalation two times a day  furosemide    Tablet 20 milliGRAM(s) Oral daily  gabapentin 400 milliGRAM(s) Oral two times a day  morphine ER Tablet 15 milliGRAM(s) Oral every 12 hours  polyethylene glycol 3350 17 Gram(s) Oral daily  rivaroxaban 2.5 milliGRAM(s) Oral two times a day  senna 2 Tablet(s) Oral at bedtime  sodium chloride 3%  Inhalation 4 milliLiter(s) Inhalation every 6 hours  thiamine 100 milliGRAM(s) Oral daily  tiotropium 2.5 MICROgram(s) Inhaler 2 Puff(s) Inhalation daily  trimethoprim  160 mG/sulfamethoxazole 800 mG 2 Tablet(s) Oral two times a day      Assessment:  79F w/ hx of ETOH in past, PAD w/ b/l carotid artery stenosis, HFpEF EF 64% w/ severe AS, COPD, HTN, CAD, RLE DVT 8/2023 w/ hx of R hip infection p/w worsening R hip pain and hypoxic resp failure    Recs:  cardiac stable  no e/o acs  cw antiplatelet, statin and antianginals for cad/stent. denies chest pain. hold off on ischemic eval for now   vol status stable =cw lasix to 20mg po qd. gentle diuresis as patient is preload dependent in setting of AS.   s/p echo, results noted. mod to severe AS. doubt would be a TAVR candidate in setting of chronic hip infection and risk of endocarditis  tx for copd per pulmonary  cw xarelto 2.5mg bid for PAD dosing and "ppx for hx of VTE"   pain control  abx per ID  f/u ortho recs        Over 25 minutes spent on total encounter; more than 50% of the visit was spent counseling and/or coordinating care by the attending physician.      Chaka Lawrence MD   Cardiovascular Disease  [phone]

## 2025-02-09 LAB
CULTURE RESULTS: ABNORMAL
SPECIMEN SOURCE: SIGNIFICANT CHANGE UP

## 2025-02-09 RX ORDER — QUETIAPINE FUMARATE 25 MG/1
12.5 TABLET ORAL ONCE
Refills: 0 | Status: COMPLETED | OUTPATIENT
Start: 2025-02-09 | End: 2025-02-09

## 2025-02-09 RX ADMIN — Medication 2 TABLET(S): at 17:05

## 2025-02-09 RX ADMIN — Medication 4 MILLILITER(S): at 17:07

## 2025-02-09 RX ADMIN — Medication 0.1 MILLIGRAM(S): at 17:05

## 2025-02-09 RX ADMIN — OXYCODONE HYDROCHLORIDE 5 MILLIGRAM(S): 30 TABLET ORAL at 12:34

## 2025-02-09 RX ADMIN — Medication 4 MILLILITER(S): at 01:04

## 2025-02-09 RX ADMIN — Medication 1 APPLICATION(S): at 13:54

## 2025-02-09 RX ADMIN — RIVAROXABAN 2.5 MILLIGRAM(S): 10 TABLET, FILM COATED ORAL at 06:15

## 2025-02-09 RX ADMIN — ATORVASTATIN CALCIUM 40 MILLIGRAM(S): 80 TABLET, FILM COATED ORAL at 23:35

## 2025-02-09 RX ADMIN — IPRATROPIUM BROMIDE AND ALBUTEROL SULFATE 3 MILLILITER(S): .5; 2.5 SOLUTION RESPIRATORY (INHALATION) at 12:34

## 2025-02-09 RX ADMIN — FUROSEMIDE 20 MILLIGRAM(S): 10 INJECTION INTRAMUSCULAR; INTRAVENOUS at 06:15

## 2025-02-09 RX ADMIN — OXYCODONE HYDROCHLORIDE 5 MILLIGRAM(S): 30 TABLET ORAL at 23:35

## 2025-02-09 RX ADMIN — Medication 2 TABLET(S): at 06:15

## 2025-02-09 RX ADMIN — Medication 1 DOSE(S): at 06:17

## 2025-02-09 RX ADMIN — CEFEPIME 100 MILLIGRAM(S): 2 INJECTION, POWDER, FOR SOLUTION INTRAVENOUS at 06:14

## 2025-02-09 RX ADMIN — GABAPENTIN 400 MILLIGRAM(S): 400 CAPSULE ORAL at 17:10

## 2025-02-09 RX ADMIN — IPRATROPIUM BROMIDE AND ALBUTEROL SULFATE 3 MILLILITER(S): .5; 2.5 SOLUTION RESPIRATORY (INHALATION) at 17:07

## 2025-02-09 RX ADMIN — IPRATROPIUM BROMIDE AND ALBUTEROL SULFATE 3 MILLILITER(S): .5; 2.5 SOLUTION RESPIRATORY (INHALATION) at 06:14

## 2025-02-09 RX ADMIN — CEFEPIME 100 MILLIGRAM(S): 2 INJECTION, POWDER, FOR SOLUTION INTRAVENOUS at 13:53

## 2025-02-09 RX ADMIN — Medication 15 MILLIGRAM(S): at 18:12

## 2025-02-09 RX ADMIN — GABAPENTIN 400 MILLIGRAM(S): 400 CAPSULE ORAL at 06:15

## 2025-02-09 RX ADMIN — IPRATROPIUM BROMIDE AND ALBUTEROL SULFATE 3 MILLILITER(S): .5; 2.5 SOLUTION RESPIRATORY (INHALATION) at 01:04

## 2025-02-09 RX ADMIN — Medication 1 DOSE(S): at 17:07

## 2025-02-09 RX ADMIN — POLYETHYLENE GLYCOL 3350 17 GRAM(S): 17 POWDER, FOR SOLUTION ORAL at 12:34

## 2025-02-09 RX ADMIN — QUETIAPINE FUMARATE 12.5 MILLIGRAM(S): 25 TABLET ORAL at 23:35

## 2025-02-09 RX ADMIN — Medication 15 MILLIGRAM(S): at 17:05

## 2025-02-09 RX ADMIN — Medication 15 MILLIGRAM(S): at 06:16

## 2025-02-09 RX ADMIN — Medication 1 APPLICATION(S): at 12:34

## 2025-02-09 RX ADMIN — RIVAROXABAN 2.5 MILLIGRAM(S): 10 TABLET, FILM COATED ORAL at 17:04

## 2025-02-09 RX ADMIN — Medication 4 MILLILITER(S): at 23:35

## 2025-02-09 RX ADMIN — Medication 4 MILLILITER(S): at 12:36

## 2025-02-09 RX ADMIN — IPRATROPIUM BROMIDE AND ALBUTEROL SULFATE 3 MILLILITER(S): .5; 2.5 SOLUTION RESPIRATORY (INHALATION) at 23:35

## 2025-02-09 RX ADMIN — Medication 100 MILLIGRAM(S): at 12:34

## 2025-02-09 RX ADMIN — Medication 0.1 MILLIGRAM(S): at 06:15

## 2025-02-09 RX ADMIN — Medication 4 MILLILITER(S): at 06:15

## 2025-02-09 RX ADMIN — CEFEPIME 100 MILLIGRAM(S): 2 INJECTION, POWDER, FOR SOLUTION INTRAVENOUS at 23:46

## 2025-02-09 RX ADMIN — OXYCODONE HYDROCHLORIDE 5 MILLIGRAM(S): 30 TABLET ORAL at 13:20

## 2025-02-09 RX ADMIN — TIOTROPIUM BROMIDE INHALATION SPRAY 2 PUFF(S): 3.12 SPRAY, METERED RESPIRATORY (INHALATION) at 12:35

## 2025-02-09 NOTE — PROGRESS NOTE ADULT - ASSESSMENT
_________________________________________________________________________________________  ========>>  M E D I C A L   A T T E N D I N G    F O L L O W  U P  N O T E  <<=========  -----------------------------------------------------------------------------------------------------    - Patient seen and examined by me earlier today.   no major events reported / noted otherwise     ==================>> REVIEW OF SYSTEM <<=================    GEN: no fever, no chills,   RESP: no SOB, no cough, no sputum  CVS: no chest pain, no palpitations  GI: no abdominal pain, no nausea  : no dysuria, no frequency  Neuro: no headache, no dizziness    ==================>> PHYSICAL EXAM <<=================    GEN: A&O X 3 , NAD , comfortable, as above .. in bed .. encouraged out of bed to chair with assistance as needed.   HEENT: NCAT, PERRL, MMM, hearing intact  CVS: S1S2 , regular , No M/R/G appreciated  PULM: scattered wheez >> decreased / improved   ABD.: soft. non tender, non distended,  Extrem: intact pulses , leg edema decreased : stasis changes , wounds dressed        ( Note written / Date of service 02-09-25 ( This is certified to be the same as "ENTERED" date above ( for billing purposes)))    ==================>> MEDICATIONS <<====================    albuterol/ipratropium for Nebulization 3 milliLiter(s) Nebulizer every 6 hours  atorvastatin 40 milliGRAM(s) Oral at bedtime  cefepime   IVPB 1000 milliGRAM(s) IV Intermittent every 8 hours  chlorhexidine 2% Cloths 1 Application(s) Topical daily  cloNIDine 0.1 milliGRAM(s) Oral every 12 hours  collagenase Ointment 1 Application(s) Topical daily  fluticasone propionate/ salmeterol 250-50 MICROgram(s) Diskus 1 Dose(s) Inhalation two times a day  furosemide    Tablet 20 milliGRAM(s) Oral daily  gabapentin 400 milliGRAM(s) Oral two times a day  morphine ER Tablet 15 milliGRAM(s) Oral every 12 hours  polyethylene glycol 3350 17 Gram(s) Oral daily  rivaroxaban 2.5 milliGRAM(s) Oral two times a day  senna 2 Tablet(s) Oral at bedtime  sodium chloride 3%  Inhalation 4 milliLiter(s) Inhalation every 6 hours  thiamine 100 milliGRAM(s) Oral daily  tiotropium 2.5 MICROgram(s) Inhaler 2 Puff(s) Inhalation daily  trimethoprim  160 mG/sulfamethoxazole 800 mG 2 Tablet(s) Oral two times a day    MEDICATIONS  (PRN):  acetaminophen     Tablet .. 650 milliGRAM(s) Oral every 6 hours PRN Temp greater or equal to 38C (100.4F), Mild Pain (1 - 3)  melatonin 3 milliGRAM(s) Oral at bedtime PRN Insomnia  naloxone Injectable 0.2 milliGRAM(s) IV Push every 3 minutes PRN respiratory depression/ suspected opioid OD  nicotine  Polacrilex Gum 4 milliGRAM(s) Oral four times a day PRN Smoking Cessation  ondansetron Injectable 4 milliGRAM(s) IV Push every 8 hours PRN Nausea and/or Vomiting  oxyCODONE    IR 5 milliGRAM(s) Oral every 4 hours PRN Severe Pain (7 - 10)    ___________  Active diet:  Diet, DASH/TLC:   Sodium & Cholesterol Restricted  ___________________    ==================>> VITAL SIGNS <<==================    Vital Signs Last 24 HrsT(C): 36.8 (02-09-25 @ 13:30)  T(F): 98.2 (02-09-25 @ 13:30), Max: 98.4 (02-08-25 @ 20:02)  HR: 75 (02-09-25 @ 16:50) (65 - 80)  BP: 128/62 (02-09-25 @ 16:50)  RR: 18 (02-09-25 @ 13:30) (18 - 18)  SpO2: 93% (02-09-25 @ 13:30) (93% - 97%)       ==================>> LAB AND IMAGING <<==================       no labs today      ____________________________    M I C R O B I O L O G Y :    Culture - Sputum (collected 07 Feb 2025 18:19)  Source: .Sputum Sputum  Gram Stain (08 Feb 2025 06:20):    Numerous polymorphonuclear leukocytes per low power field    Rare Squamous epithelial cells per low power field    Numerous Gram Negative Rods seen per oil power field    Moderate Gram positive cocci in pairs seen per oil power field    Moderate Yeast likecells seen per oil power field    Few Gram Positive Rods seen per oil power field  Final Report (09 Feb 2025 11:29):    Mixed gram negative rods    Commensal [place] consistent with body site          < from: TTE W or WO Ultrasound Enhancing Agent (01.28.25 @ 13:55) >  CONCLUSIONS:    1. Left ventricular cavity is small. Left ventricular wall thickness is normal. Left ventricular systolic function is normal with an ejection fraction of 73 % by Rios's method of disks.   2. Mildly enlarged right ventricular cavity size, with normal wall thickness, and normal right ventricular systolic function.   3. Moderate to severe aortic stenosis.   4. Moderate tricuspid regurgitation.   5. Estimated pulmonary artery systolic pressure is 75 mmHg, consistent with severe pulmonary hypertension.   6. No pericardial effusion seen.   7. Compared to the transthoracic echocardiogram performed on 4/12/2023, there have been no significant interval changes.  < end of copied text >    < from: CT Angio Chest PE Protocol w/ IV Cont (01.29.25 @ 08:47) >  IMPRESSION:  No pulmonary embolism.  Right upper lobe groundglass opacities and right lower lobe nodular   opacities are likely infectious/inflammatory.  Small bilateral pleural effusions.  < end of copied text >    < from: [facility] Duplex Lower Ext Vein Scan, Bilat (01.27.25 @ 20:06) >  IMPRESSION:  No evidence of deep venous thrombosis in either lower extremity.  < end of copied text >    < from: Xray Knee 3 Views, Right (01.27.25 @ 23:18) >  IMPRESSION:  1.  Right longstem femoral revision hardware with lucency surrounding the   hardware and marked subsidence in keeping with loosening.  2.  Lucency at the lateral cortex of the proximal femur may reflect   postoperative change versus osteolysis.  3.  Pseudoarticulation between the proximal femur and right iliac bone  < end of copied text >    < from: [facility] Duplex Lower Ext Vein Scan, Bilat (01.27.25 @ 20:06) >  IMPRESSION:  No evidence of deep venous thrombosis in either lower extremity.  < end of copied text >    < from: TTE W or WO Ultrasound Enhancing Agent (06.17.24 @ 17:12) >  CONCLUSIONS:    1. Left ventricular cavity is normal in size. Left ventricular wall thickness is normal. Left ventricular systolic function is normal with an ejection fraction of 64 % by Rios's method of disks. There are no regional wall motion abnormalities seen.   2. There is mild (grade 1) left ventricular diastolic dysfunction.   3. Normal right ventricular cavity size and normal systolic function.   4. Structurally normal mitral valve with normal leaflet excursion. There is calcification of the mitral valve annulus. There is mild mitral regurgitation.   5. The aortic valve anatomy cannot be determined with reduced systolic excursion. There is calcification of the aortic valve leaflets. There is severe aortic stenosis. The peak transaortic velocity is 3.94 m/s, peak transaortic gradient is 62.1 mmHg and mean transaortic gradient is 32.0 mmHg with an LVOT/aortic valve VTI ratio of 0.22. The aorticvalve area is estimated at 0.51 cm² by the continuity equation. There is mild to moderate aortic regurgitation.   6. The left atrium is mildly dilated with an indexed volume of 41 ml/m².   7. No prior echocardiogram is available for comparison.  < end of copied text >    ___________________________________________________________________________________  ===============>>  A S S E S S M E N T   A N D   P L A N <<===============  ------------------------------------------------------------------------------------------    79F w/ hx of ETOH in past, PAD w/ b/l carotid artery stenosis, HFpEF EF 64% w/ severe AS, COPD, HTN, CAD, RLE DVT 8/2023 w/ hx of R hip infection p/w worsening R hip pain     Problem/Plan - 1:  ·  Problem: right hip pain.   ·  Plan: Patient endorsing severe R hip pain w/o inciting fall or traumatic event. 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 06/2024 admission with possible joint infection. No clear signs of infection currently and exam is equivocal. -Pain control as ordered [noting there is some substance use history]     >> pain management follow up and further management            Bowel regimen as needed   -cultures as above   - Ortho following , noted   - ID appreciated :     >>>  Levofloxacin 750 mg po q48hrs ( msec on 1/27) and Bactrim 1 DS po q8hrs to complete 6 weeks course (till 3/13)       -would do Bactrim 1 DS po daily for chronic suppression of R hip PJI instead of doxycycline there after   >> outpatient ID follow up      Problem/Plan - 2:  ·  Problem: acute exacerbation of Chronic heart failure with preserved ejection fraction (HFpEF), edema, elevated BNP     in patient with severe Aortic stenosis as above   repeat TTE as above with AS, MR, PAH  diuretics per cardio ( patient declined some doses before)   -Daily weight and I/O  cardio following   Xarelto 2.5 mg BID per cardio    CTA and Duplex negative for VTE      Problem/Plan - 3:  ·  Problem: COPD   ? has 02 PRN at home. Patient smoking tobacco >> quitting   -Duonebs Q6hrs  -Cont. Symbicort BID  -Cont. Spiriva  - pulm appreciated     ## pneumonia on CT as above >> post antibiotics >> monitor     lung sounds and breathing better      Problem/Plan - 4:  ·  Problem: CAD (coronary artery disease).   ·  Plan: Patient states she does not take aspirin or atorvastatin at home.  cardio following, appreciated     Problem/Plan - 5:  ·  Problem: EtOH dependence.   no signs of withdrawal      Problem/Plan - 6:  ·  Problem: history of Severe aortic stenosis.   repeat echo as above : unchanged   cardio appreciated     Problem/Plan - 7:  ·  Problem: Essential hypertension.   ·  Plan: -Trend BP  -Cont. Clonidine BID  -Patient does not recall taking Nifedipine, can resume prior dose if BPs uncontrolled.     Problem/Plan - 8:  ·  Problem: Prophylactic measure.   ·  Plan: DVT PPx  xarelto     DISCHARGE PLANING     --------------------------------------------  Case discussed with patient  ___________________________  MARIZOL Perez D.O.  Pager: [phone]

## 2025-02-10 LAB
ANION GAP SERPL CALC-SCNC: 9 MMOL/L — SIGNIFICANT CHANGE UP (ref 5–17)
BUN SERPL-MCNC: 57 MG/DL — HIGH (ref 7–23)
CALCIUM SERPL-MCNC: 8.8 MG/DL — SIGNIFICANT CHANGE UP (ref 8.4–10.5)
CHLORIDE SERPL-SCNC: 98 MMOL/L — SIGNIFICANT CHANGE UP (ref 96–108)
CO2 SERPL-SCNC: 28 MMOL/L — SIGNIFICANT CHANGE UP (ref 22–31)
CREAT SERPL-MCNC: 1.64 MG/DL — HIGH (ref 0.5–1.3)
EGFR: 32 ML/MIN/1.73M2 — LOW
GLUCOSE SERPL-MCNC: 82 MG/DL — SIGNIFICANT CHANGE UP (ref 70–99)
HCT VFR BLD CALC: 32.9 % — LOW (ref 34.5–45)
HGB BLD-MCNC: 10 G/DL — LOW (ref 11.5–15.5)
MCHC RBC-ENTMCNC: 29.4 PG — SIGNIFICANT CHANGE UP (ref 27–34)
MCHC RBC-ENTMCNC: 30.4 G/DL — LOW (ref 32–36)
MCV RBC AUTO: 96.8 FL — SIGNIFICANT CHANGE UP (ref 80–100)
NRBC # BLD: 0 /100 WBCS — SIGNIFICANT CHANGE UP (ref 0–0)
NRBC BLD-RTO: 0 /100 WBCS — SIGNIFICANT CHANGE UP (ref 0–0)
PLATELET # BLD AUTO: 288 K/UL — SIGNIFICANT CHANGE UP (ref 150–400)
POTASSIUM SERPL-MCNC: 5.6 MMOL/L — HIGH (ref 3.5–5.3)
POTASSIUM SERPL-SCNC: 5.6 MMOL/L — HIGH (ref 3.5–5.3)
RBC # BLD: 3.4 M/UL — LOW (ref 3.8–5.2)
RBC # FLD: 15.9 % — HIGH (ref 10.3–14.5)
SODIUM SERPL-SCNC: 135 MMOL/L — SIGNIFICANT CHANGE UP (ref 135–145)
WBC # BLD: 5.76 K/UL — SIGNIFICANT CHANGE UP (ref 3.8–10.5)
WBC # FLD AUTO: 5.76 K/UL — SIGNIFICANT CHANGE UP (ref 3.8–10.5)

## 2025-02-10 RX ORDER — QUETIAPINE FUMARATE 25 MG/1
12.5 TABLET ORAL ONCE
Refills: 0 | Status: COMPLETED | OUTPATIENT
Start: 2025-02-10 | End: 2025-02-10

## 2025-02-10 RX ORDER — CEFEPIME 2 G/20ML
1000 INJECTION, POWDER, FOR SOLUTION INTRAVENOUS EVERY 12 HOURS
Refills: 0 | Status: DISCONTINUED | OUTPATIENT
Start: 2025-02-10 | End: 2025-02-17

## 2025-02-10 RX ORDER — SULFAMETHOXAZOLE/TRIMETHOPRIM 800-160 MG
1 TABLET ORAL
Refills: 0 | Status: DISCONTINUED | OUTPATIENT
Start: 2025-02-10 | End: 2025-02-17

## 2025-02-10 RX ORDER — SODIUM ZIRCONIUM CYCLOSILICATE 5 G/5G
5 POWDER, FOR SUSPENSION ORAL ONCE
Refills: 0 | Status: COMPLETED | OUTPATIENT
Start: 2025-02-10 | End: 2025-02-10

## 2025-02-10 RX ADMIN — OXYCODONE HYDROCHLORIDE 5 MILLIGRAM(S): 30 TABLET ORAL at 23:55

## 2025-02-10 RX ADMIN — TIOTROPIUM BROMIDE INHALATION SPRAY 2 PUFF(S): 3.12 SPRAY, METERED RESPIRATORY (INHALATION) at 11:41

## 2025-02-10 RX ADMIN — Medication 3 MILLIGRAM(S): at 22:55

## 2025-02-10 RX ADMIN — Medication 1 APPLICATION(S): at 11:42

## 2025-02-10 RX ADMIN — IPRATROPIUM BROMIDE AND ALBUTEROL SULFATE 3 MILLILITER(S): .5; 2.5 SOLUTION RESPIRATORY (INHALATION) at 06:50

## 2025-02-10 RX ADMIN — OXYCODONE HYDROCHLORIDE 5 MILLIGRAM(S): 30 TABLET ORAL at 11:44

## 2025-02-10 RX ADMIN — OXYCODONE HYDROCHLORIDE 5 MILLIGRAM(S): 30 TABLET ORAL at 12:30

## 2025-02-10 RX ADMIN — SODIUM ZIRCONIUM CYCLOSILICATE 5 GRAM(S): 5 POWDER, FOR SUSPENSION ORAL at 17:12

## 2025-02-10 RX ADMIN — Medication 15 MILLIGRAM(S): at 06:48

## 2025-02-10 RX ADMIN — POLYETHYLENE GLYCOL 3350 17 GRAM(S): 17 POWDER, FOR SOLUTION ORAL at 11:41

## 2025-02-10 RX ADMIN — RIVAROXABAN 2.5 MILLIGRAM(S): 10 TABLET, FILM COATED ORAL at 06:48

## 2025-02-10 RX ADMIN — GABAPENTIN 400 MILLIGRAM(S): 400 CAPSULE ORAL at 17:12

## 2025-02-10 RX ADMIN — Medication 1 DOSE(S): at 06:53

## 2025-02-10 RX ADMIN — Medication 4 MILLILITER(S): at 18:07

## 2025-02-10 RX ADMIN — Medication 2 TABLET(S): at 06:48

## 2025-02-10 RX ADMIN — GABAPENTIN 400 MILLIGRAM(S): 400 CAPSULE ORAL at 06:49

## 2025-02-10 RX ADMIN — Medication 0.1 MILLIGRAM(S): at 06:48

## 2025-02-10 RX ADMIN — IPRATROPIUM BROMIDE AND ALBUTEROL SULFATE 3 MILLILITER(S): .5; 2.5 SOLUTION RESPIRATORY (INHALATION) at 11:40

## 2025-02-10 RX ADMIN — Medication 0.1 MILLIGRAM(S): at 17:14

## 2025-02-10 RX ADMIN — Medication 1 TABLET(S): at 17:11

## 2025-02-10 RX ADMIN — ATORVASTATIN CALCIUM 40 MILLIGRAM(S): 80 TABLET, FILM COATED ORAL at 22:55

## 2025-02-10 RX ADMIN — Medication 4 MILLILITER(S): at 22:54

## 2025-02-10 RX ADMIN — FUROSEMIDE 20 MILLIGRAM(S): 10 INJECTION INTRAMUSCULAR; INTRAVENOUS at 06:49

## 2025-02-10 RX ADMIN — Medication 3 MILLIGRAM(S): at 01:55

## 2025-02-10 RX ADMIN — QUETIAPINE FUMARATE 12.5 MILLIGRAM(S): 25 TABLET ORAL at 22:55

## 2025-02-10 RX ADMIN — CEFEPIME 100 MILLIGRAM(S): 2 INJECTION, POWDER, FOR SOLUTION INTRAVENOUS at 06:47

## 2025-02-10 RX ADMIN — Medication 15 MILLIGRAM(S): at 17:13

## 2025-02-10 RX ADMIN — OXYCODONE HYDROCHLORIDE 5 MILLIGRAM(S): 30 TABLET ORAL at 00:35

## 2025-02-10 RX ADMIN — IPRATROPIUM BROMIDE AND ALBUTEROL SULFATE 3 MILLILITER(S): .5; 2.5 SOLUTION RESPIRATORY (INHALATION) at 17:12

## 2025-02-10 RX ADMIN — Medication 4 MILLILITER(S): at 06:50

## 2025-02-10 RX ADMIN — IPRATROPIUM BROMIDE AND ALBUTEROL SULFATE 3 MILLILITER(S): .5; 2.5 SOLUTION RESPIRATORY (INHALATION) at 22:54

## 2025-02-10 RX ADMIN — Medication 1 DOSE(S): at 17:11

## 2025-02-10 RX ADMIN — OXYCODONE HYDROCHLORIDE 5 MILLIGRAM(S): 30 TABLET ORAL at 22:55

## 2025-02-10 RX ADMIN — Medication 4 MILLILITER(S): at 11:41

## 2025-02-10 RX ADMIN — CEFEPIME 100 MILLIGRAM(S): 2 INJECTION, POWDER, FOR SOLUTION INTRAVENOUS at 17:11

## 2025-02-10 RX ADMIN — RIVAROXABAN 2.5 MILLIGRAM(S): 10 TABLET, FILM COATED ORAL at 17:11

## 2025-02-10 NOTE — PROGRESS NOTE ADULT - SUBJECTIVE AND OBJECTIVE BOX
Cardiovascular Disease Progress Note    Overnight events: No acute events overnight.  no new cardiac sx  Otherwise review of systems negative    Objective Findings:  T(C): 36.7 (02-10-25 @ 05:12), Max: 36.8 (02-09-25 @ 13:30)  HR: 72 (02-10-25 @ 05:12) (65 - 75)  BP: 118/69 (02-10-25 @ 05:12) (116/61 - 128/68)  RR: 18 (02-10-25 @ 05:12) (18 - 18)  SpO2: 92% (02-10-25 @ 05:12) (92% - 96%)  Wt(kg): --  Daily     Daily       Physical Exam:  Gen: NAD  HEENT: EOMI  CV: RRR, normal S1 + S2, no m/r/g  Lungs: CTAB  Abd: soft, non-tender  Ext: No edema    Telemetry:    Laboratory Data:                    Inpatient Medications:  MEDICATIONS  (STANDING):  albuterol/ipratropium for Nebulization 3 milliLiter(s) Nebulizer every 6 hours  atorvastatin 40 milliGRAM(s) Oral at bedtime  cefepime   IVPB 1000 milliGRAM(s) IV Intermittent every 8 hours  chlorhexidine 2% Cloths 1 Application(s) Topical daily  cloNIDine 0.1 milliGRAM(s) Oral every 12 hours  collagenase Ointment 1 Application(s) Topical daily  fluticasone propionate/ salmeterol 250-50 MICROgram(s) Diskus 1 Dose(s) Inhalation two times a day  furosemide    Tablet 20 milliGRAM(s) Oral daily  gabapentin 400 milliGRAM(s) Oral two times a day  morphine ER Tablet 15 milliGRAM(s) Oral every 12 hours  polyethylene glycol 3350 17 Gram(s) Oral daily  rivaroxaban 2.5 milliGRAM(s) Oral two times a day  senna 2 Tablet(s) Oral at bedtime  sodium chloride 3%  Inhalation 4 milliLiter(s) Inhalation every 6 hours  thiamine 100 milliGRAM(s) Oral daily  tiotropium 2.5 MICROgram(s) Inhaler 2 Puff(s) Inhalation daily  trimethoprim  160 mG/sulfamethoxazole 800 mG 2 Tablet(s) Oral two times a day      Assessment:  79F w/ hx of ETOH in past, PAD w/ b/l carotid artery stenosis, HFpEF EF 64% w/ severe AS, COPD, HTN, CAD, RLE DVT 8/2023 w/ hx of R hip infection p/w worsening R hip pain and hypoxic resp failure    Recs:  cardiac stable  no e/o acs  cw antiplatelet, statin and antianginals for cad/stent. denies chest pain. hold off on ischemic eval for now   vol status stable =cw lasix to 20mg po qd. gentle diuresis as patient is preload dependent in setting of AS.   s/p echo, results noted. mod to severe AS. doubt would be a TAVR candidate in setting of chronic hip infection and risk of endocarditis  tx for copd per pulmonary  cw xarelto 2.5mg bid for PAD dosing and "ppx for hx of VTE"   pain control  abx per ID  f/u ortho recs      Over 25 minutes spent on total encounter; more than 50% of the visit was spent counseling and/or coordinating care by the attending physician.      Chaka Lawrence MD   Cardiovascular Disease  [phone]

## 2025-02-10 NOTE — PROGRESS NOTE ADULT - ASSESSMENT
_________________________________________________________________________________________  ========>>  M E D I C A L   A T T E N D I N G    F O L L O W  U P  N O T E  <<=========  -----------------------------------------------------------------------------------------------------    - Patient seen and examined by me earlier today.   no major events reported / noted otherwise     ==================>> REVIEW OF SYSTEM <<=================    GEN: no fever, no chills,   RESP: no SOB, no cough, no sputum  CVS: no chest pain, no palpitations  GI: no abdominal pain, no nausea  : no dysuria, no frequency  Neuro: no headache, no dizziness    ==================>> PHYSICAL EXAM <<=================    GEN: A&O X 3 , NAD , comfortable, as above .. in bed .. encouraged out of bed to chair with assistance as needed.   HEENT: NCAT, PERRL, MMM, hearing intact  CVS: S1S2 , regular , No M/R/G appreciated  PULM: scattered wheez >> decreased / improved   ABD.: soft. non tender, non distended,  Extrem: intact pulses , leg edema decreased : stasis changes , wounds dressed         ( Note written / Date of service 02-10-25 ( This is certified to be the same as "ENTERED" date above ( for billing purposes)))    ==================>> MEDICATIONS <<====================    albuterol/ipratropium for Nebulization 3 milliLiter(s) Nebulizer every 6 hours  atorvastatin 40 milliGRAM(s) Oral at bedtime  cefepime   IVPB 1000 milliGRAM(s) IV Intermittent every 12 hours  chlorhexidine 2% Cloths 1 Application(s) Topical daily  cloNIDine 0.1 milliGRAM(s) Oral every 12 hours  collagenase Ointment 1 Application(s) Topical daily  fluticasone propionate/ salmeterol 250-50 MICROgram(s) Diskus 1 Dose(s) Inhalation two times a day  furosemide    Tablet 20 milliGRAM(s) Oral daily  gabapentin 400 milliGRAM(s) Oral two times a day  morphine ER Tablet 15 milliGRAM(s) Oral every 12 hours  polyethylene glycol 3350 17 Gram(s) Oral daily  rivaroxaban 2.5 milliGRAM(s) Oral two times a day  senna 2 Tablet(s) Oral at bedtime  sodium chloride 3%  Inhalation 4 milliLiter(s) Inhalation every 6 hours  thiamine 100 milliGRAM(s) Oral daily  tiotropium 2.5 MICROgram(s) Inhaler 2 Puff(s) Inhalation daily  trimethoprim  160 mG/sulfamethoxazole 800 mG 1 Tablet(s) Oral two times a day    MEDICATIONS  (PRN):  acetaminophen     Tablet .. 650 milliGRAM(s) Oral every 6 hours PRN Temp greater or equal to 38C (100.4F), Mild Pain (1 - 3)  melatonin 3 milliGRAM(s) Oral at bedtime PRN Insomnia  naloxone Injectable 0.2 milliGRAM(s) IV Push every 3 minutes PRN respiratory depression/ suspected opioid OD  nicotine  Polacrilex Gum 4 milliGRAM(s) Oral four times a day PRN Smoking Cessation  ondansetron Injectable 4 milliGRAM(s) IV Push every 8 hours PRN Nausea and/or Vomiting  oxyCODONE    IR 5 milliGRAM(s) Oral every 4 hours PRN Severe Pain (7 - 10)    ___________  Active diet:  Diet, DASH/TLC:   Sodium & Cholesterol Restricted  ___________________    ==================>> VITAL SIGNS <<==================    Vital Signs Last 24 HrsT(C): 36.8 (02-10-25 @ 11:36)  T(F): 98.2 (02-10-25 @ 11:36), Max: 98.2 (02-10-25 @ 11:36)  HR: 69 (02-10-25 @ 11:36) (65 - 75)  BP: 116/60 (02-10-25 @ 11:36)  RR: 18 (02-10-25 @ 11:36) (18 - 18)  SpO2: 92% (02-10-25 @ 11:36) (92% - 96%)       ==================>> LAB AND IMAGING <<==================                        10.0   5.76  )-----------( 288      ( 10 Feb 2025 07:20 )             32.9        02-10    135  |  98  |  57[H]  ----------------------------<  82  5.6[H]   |  28  |  1.64[H]    Ca    8.8      10 Feb 2025 07:20      WBC count:   5.76 <<== ,  5.66 <<==   Hemoglobin:   10.0 <<==,  10.0 <<==  platelets:  288 <==, 287 <==    Creatinine:  1.64  <<==, 1.27  <<==  Sodium:   135  <==, 134  <==       AST:          14(01-30) <== , 15(01-27) <== , 24(01-27) <==      ALT:        8(01-30)  <== , 11(01-27)  <== , 15(01-27)  <==      AP:        80(01-30)  <=, 109(01-27)  <=, 121(01-27)  <=     Bili:        0.4(01-30)  <=, 0.3(01-27)  <=, 0.3(01-27)  <=    ____________________________    M I C R O B I O L O G Y :    Culture - Sputum (collected 07 Feb 2025 18:19)  Source: .Sputum Sputum  Gram Stain (08 Feb 2025 06:20):    Numerous polymorphonuclear leukocytes per low power field    Rare Squamous epithelial cells per low power field    Numerous Gram Negative Rods seen per oil power field    Moderate Gram positive cocci in pairs seen per oil power field    Moderate Yeast likecells seen per oil power field    Few Gram Positive Rods seen per oil power field  Final Report (09 Feb 2025 11:29):    Mixed gram negative rods    Commensal [place] consistent with body site                < from: TTE W or WO Ultrasound Enhancing Agent (01.28.25 @ 13:55) >  CONCLUSIONS:    1. Left ventricular cavity is small. Left ventricular wall thickness is normal. Left ventricular systolic function is normal with an ejection fraction of 73 % by Rios's method of disks.   2. Mildly enlarged right ventricular cavity size, with normal wall thickness, and normal right ventricular systolic function.   3. Moderate to severe aortic stenosis.   4. Moderate tricuspid regurgitation.   5. Estimated pulmonary artery systolic pressure is 75 mmHg, consistent with severe pulmonary hypertension.   6. No pericardial effusion seen.   7. Compared to the transthoracic echocardiogram performed on 4/12/2023, there have been no significant interval changes.  < end of copied text >    < from: CT Angio Chest PE Protocol w/ IV Cont (01.29.25 @ 08:47) >  IMPRESSION:  No pulmonary embolism.  Right upper lobe groundglass opacities and right lower lobe nodular   opacities are likely infectious/inflammatory.  Small bilateral pleural effusions.  < end of copied text >    < from: [facility] Duplex Lower Ext Vein Scan, Bilat (01.27.25 @ 20:06) >  IMPRESSION:  No evidence of deep venous thrombosis in either lower extremity.  < end of copied text >    < from: Xray Knee 3 Views, Right (01.27.25 @ 23:18) >  IMPRESSION:  1.  Right longstem femoral revision hardware with lucency surrounding the   hardware and marked subsidence in keeping with loosening.  2.  Lucency at the lateral cortex of the proximal femur may reflect   postoperative change versus osteolysis.  3.  Pseudoarticulation between the proximal femur and right iliac bone  < end of copied text >    < from: [facility] Duplex Lower Ext Vein Scan, Bilat (01.27.25 @ 20:06) >  IMPRESSION:  No evidence of deep venous thrombosis in either lower extremity.  < end of copied text >    < from: TTE W or WO Ultrasound Enhancing Agent (06.17.24 @ 17:12) >  CONCLUSIONS:    1. Left ventricular cavity is normal in size. Left ventricular wall thickness is normal. Left ventricular systolic function is normal with an ejection fraction of 64 % by Rios's method of disks. There are no regional wall motion abnormalities seen.   2. There is mild (grade 1) left ventricular diastolic dysfunction.   3. Normal right ventricular cavity size and normal systolic function.   4. Structurally normal mitral valve with normal leaflet excursion. There is calcification of the mitral valve annulus. There is mild mitral regurgitation.   5. The aortic valve anatomy cannot be determined with reduced systolic excursion. There is calcification of the aortic valve leaflets. There is severe aortic stenosis. The peak transaortic velocity is 3.94 m/s, peak transaortic gradient is 62.1 mmHg and mean transaortic gradient is 32.0 mmHg with an LVOT/aortic valve VTI ratio of 0.22. The aorticvalve area is estimated at 0.51 cm² by the continuity equation. There is mild to moderate aortic regurgitation.   6. The left atrium is mildly dilated with an indexed volume of 41 ml/m².   7. No prior echocardiogram is available for comparison.  < end of copied text >    ___________________________________________________________________________________  ===============>>  A S S E S S M E N T   A N D   P L A N <<===============  ------------------------------------------------------------------------------------------    79F w/ hx of ETOH in past, PAD w/ b/l carotid artery stenosis, HFpEF EF 64% w/ severe AS, COPD, HTN, CAD, RLE DVT 8/2023 w/ hx of R hip infection p/w worsening R hip pain     Problem/Plan - 1:  ·  Problem: right hip pain.   ·  Plan: Patient endorsing severe R hip pain w/o inciting fall or traumatic event. 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 06/2024 admission with possible joint infection. No clear signs of infection currently and exam is equivocal. -Pain control as ordered [noting there is some substance use history]     >> pain management follow up and further management            Bowel regimen as needed   -cultures as above   - Ortho following , noted   - ID appreciated :     >>>  Levofloxacin 750 mg po q48hrs ( msec on 1/27) and Bactrim 1 DS po q8hrs to complete 6 weeks course (till 3/13)       -would do Bactrim 1 DS po daily for chronic suppression of R hip PJI instead of doxycycline there after   >> outpatient ID follow up      Problem/Plan - 2:  ·  Problem: acute exacerbation of Chronic heart failure with preserved ejection fraction (HFpEF), edema, elevated BNP     in patient with severe Aortic stenosis as above   repeat TTE as above with AS, MR, PAH  diuretics per cardio ( patient declined some doses before)   -Daily weight and I/O  cardio following   Xarelto 2.5 mg BID per cardio    CTA and Duplex negative for VTE      Problem/Plan - 3:  ·  Problem: COPD   ? has 02 PRN at home. Patient smoking tobacco >> quitting   -Duonebs Q6hrs  -Cont. Symbicort BID  -Cont. Spiriva  - pulm appreciated     ## pneumonia on CT as above >> post antibiotics >> monitor     lung sounds and breathing better      Problem/Plan - 4:  ·  Problem: CAD (coronary artery disease).   ·  Plan: Patient states she does not take aspirin or atorvastatin at home.  cardio following, appreciated     Problem/Plan - 5:  ·  Problem: EtOH dependence.   no signs of withdrawal      Problem/Plan - 6:  ·  Problem: history of Severe aortic stenosis.   repeat echo as above : unchanged   cardio appreciated     Problem/Plan - 7:  ·  Problem: Essential hypertension.   ·  Plan: -Trend BP  -Cont. Clonidine BID  -Patient does not recall taking Nifedipine, can resume prior dose if BPs uncontrolled.     Problem/Plan - 8:  ·  Problem: Prophylactic measure.   ·  Plan: DVT PPx  xarelto     DISCHARGE PLANING / placement     --------------------------------------------  Case discussed with patient  ___________________________  H. GONZALO Perez.  Pager: [phone]

## 2025-02-10 NOTE — CHART NOTE - NSCHARTNOTEFT_GEN_A_CORE
Patient with LAI today Cr 1.7   Please change Bactrim dose to 1DS po bid and cefepime dose to 1 g IV bid   Monitor kidney function  Antibiotics Dosing on discharge depending on kidney function   Please reach out to ID /pharmacy prior to discharge for final antibiotics dosing     Discussed with CATY Lozano MD  ID physician  [facility] Teams Preferred  After 5pm/weekends call [phone]

## 2025-02-11 LAB
ANION GAP SERPL CALC-SCNC: 9 MMOL/L — SIGNIFICANT CHANGE UP (ref 5–17)
BUN SERPL-MCNC: 55 MG/DL — HIGH (ref 7–23)
CALCIUM SERPL-MCNC: 9.2 MG/DL — SIGNIFICANT CHANGE UP (ref 8.4–10.5)
CHLORIDE SERPL-SCNC: 99 MMOL/L — SIGNIFICANT CHANGE UP (ref 96–108)
CO2 SERPL-SCNC: 27 MMOL/L — SIGNIFICANT CHANGE UP (ref 22–31)
CREAT SERPL-MCNC: 1.46 MG/DL — HIGH (ref 0.5–1.3)
EGFR: 37 ML/MIN/1.73M2 — LOW
GLUCOSE SERPL-MCNC: 81 MG/DL — SIGNIFICANT CHANGE UP (ref 70–99)
POTASSIUM SERPL-MCNC: 5.5 MMOL/L — HIGH (ref 3.5–5.3)
POTASSIUM SERPL-SCNC: 5.5 MMOL/L — HIGH (ref 3.5–5.3)
SODIUM SERPL-SCNC: 135 MMOL/L — SIGNIFICANT CHANGE UP (ref 135–145)

## 2025-02-11 RX ORDER — SODIUM ZIRCONIUM CYCLOSILICATE 5 G/5G
5 POWDER, FOR SUSPENSION ORAL ONCE
Refills: 0 | Status: COMPLETED | OUTPATIENT
Start: 2025-02-11 | End: 2025-02-11

## 2025-02-11 RX ORDER — QUETIAPINE FUMARATE 25 MG/1
12.5 TABLET ORAL ONCE
Refills: 0 | Status: COMPLETED | OUTPATIENT
Start: 2025-02-11 | End: 2025-02-11

## 2025-02-11 RX ADMIN — RIVAROXABAN 2.5 MILLIGRAM(S): 10 TABLET, FILM COATED ORAL at 06:49

## 2025-02-11 RX ADMIN — Medication 1 APPLICATION(S): at 11:29

## 2025-02-11 RX ADMIN — Medication 4 MILLILITER(S): at 23:00

## 2025-02-11 RX ADMIN — RIVAROXABAN 2.5 MILLIGRAM(S): 10 TABLET, FILM COATED ORAL at 17:36

## 2025-02-11 RX ADMIN — OXYCODONE HYDROCHLORIDE 5 MILLIGRAM(S): 30 TABLET ORAL at 22:41

## 2025-02-11 RX ADMIN — Medication 0.1 MILLIGRAM(S): at 06:48

## 2025-02-11 RX ADMIN — Medication 1 TABLET(S): at 06:49

## 2025-02-11 RX ADMIN — ATORVASTATIN CALCIUM 40 MILLIGRAM(S): 80 TABLET, FILM COATED ORAL at 22:41

## 2025-02-11 RX ADMIN — Medication 4 MILLILITER(S): at 17:34

## 2025-02-11 RX ADMIN — OXYCODONE HYDROCHLORIDE 5 MILLIGRAM(S): 30 TABLET ORAL at 23:41

## 2025-02-11 RX ADMIN — CEFEPIME 100 MILLIGRAM(S): 2 INJECTION, POWDER, FOR SOLUTION INTRAVENOUS at 17:35

## 2025-02-11 RX ADMIN — Medication 15 MILLIGRAM(S): at 06:49

## 2025-02-11 RX ADMIN — Medication 0.1 MILLIGRAM(S): at 17:35

## 2025-02-11 RX ADMIN — Medication 1 TABLET(S): at 17:35

## 2025-02-11 RX ADMIN — Medication 1 APPLICATION(S): at 11:30

## 2025-02-11 RX ADMIN — CEFEPIME 100 MILLIGRAM(S): 2 INJECTION, POWDER, FOR SOLUTION INTRAVENOUS at 08:05

## 2025-02-11 RX ADMIN — TIOTROPIUM BROMIDE INHALATION SPRAY 2 PUFF(S): 3.12 SPRAY, METERED RESPIRATORY (INHALATION) at 11:30

## 2025-02-11 RX ADMIN — Medication 4 MILLILITER(S): at 06:48

## 2025-02-11 RX ADMIN — GABAPENTIN 400 MILLIGRAM(S): 400 CAPSULE ORAL at 17:35

## 2025-02-11 RX ADMIN — OXYCODONE HYDROCHLORIDE 5 MILLIGRAM(S): 30 TABLET ORAL at 11:58

## 2025-02-11 RX ADMIN — IPRATROPIUM BROMIDE AND ALBUTEROL SULFATE 3 MILLILITER(S): .5; 2.5 SOLUTION RESPIRATORY (INHALATION) at 11:29

## 2025-02-11 RX ADMIN — OXYCODONE HYDROCHLORIDE 5 MILLIGRAM(S): 30 TABLET ORAL at 12:30

## 2025-02-11 RX ADMIN — SODIUM ZIRCONIUM CYCLOSILICATE 5 GRAM(S): 5 POWDER, FOR SUSPENSION ORAL at 17:36

## 2025-02-11 RX ADMIN — Medication 15 MILLIGRAM(S): at 17:35

## 2025-02-11 RX ADMIN — IPRATROPIUM BROMIDE AND ALBUTEROL SULFATE 3 MILLILITER(S): .5; 2.5 SOLUTION RESPIRATORY (INHALATION) at 06:48

## 2025-02-11 RX ADMIN — IPRATROPIUM BROMIDE AND ALBUTEROL SULFATE 3 MILLILITER(S): .5; 2.5 SOLUTION RESPIRATORY (INHALATION) at 17:34

## 2025-02-11 RX ADMIN — IPRATROPIUM BROMIDE AND ALBUTEROL SULFATE 3 MILLILITER(S): .5; 2.5 SOLUTION RESPIRATORY (INHALATION) at 23:00

## 2025-02-11 RX ADMIN — POLYETHYLENE GLYCOL 3350 17 GRAM(S): 17 POWDER, FOR SOLUTION ORAL at 11:29

## 2025-02-11 RX ADMIN — Medication 1 DOSE(S): at 06:48

## 2025-02-11 RX ADMIN — Medication 1 DOSE(S): at 17:36

## 2025-02-11 RX ADMIN — Medication 4 MILLILITER(S): at 11:29

## 2025-02-11 RX ADMIN — FUROSEMIDE 20 MILLIGRAM(S): 10 INJECTION INTRAMUSCULAR; INTRAVENOUS at 06:49

## 2025-02-11 RX ADMIN — QUETIAPINE FUMARATE 12.5 MILLIGRAM(S): 25 TABLET ORAL at 22:41

## 2025-02-11 RX ADMIN — GABAPENTIN 400 MILLIGRAM(S): 400 CAPSULE ORAL at 06:49

## 2025-02-11 NOTE — CHART NOTE - NSCHARTNOTEFT_GEN_A_CORE
Primary team called asking if we could speak with pt's daughter about [place] procedure that we offered to pt in June 2024 and again on this admission, refused both times by the patient. Attempted to call daughter twice at [phone], with no response.

## 2025-02-11 NOTE — PROGRESS NOTE ADULT - SUBJECTIVE AND OBJECTIVE BOX
Cardiovascular Disease Progress Note    Overnight events: No acute events overnight. no new cardiac sx   Otherwise review of systems negative    Objective Findings:  T(C): 36.6 (02-11-25 @ 04:51), Max: 36.8 (02-10-25 @ 11:36)  HR: 82 (02-11-25 @ 06:47) (69 - 82)  BP: 133/63 (02-11-25 @ 06:47) (116/60 - 133/63)  RR: 18 (02-11-25 @ 04:51) (18 - 18)  SpO2: 95% (02-11-25 @ 04:51) (92% - 95%)  Wt(kg): --  Daily     Daily       Physical Exam:  Gen: NAD  HEENT: EOMI  CV: RRR, normal S1 + S2, no m/r/g  Lungs: CTAB  Abd: soft, non-tender  Ext: No edema    Telemetry:    Laboratory Data:                        10.0   5.76  )-----------( 288      ( 10 Feb 2025 07:20 )             32.9     02-10    135  |  98  |  57[H]  ----------------------------<  82  5.6[H]   |  28  |  1.64[H]    Ca    8.8      10 Feb 2025 07:20                Inpatient Medications:  MEDICATIONS  (STANDING):  albuterol/ipratropium for Nebulization 3 milliLiter(s) Nebulizer every 6 hours  atorvastatin 40 milliGRAM(s) Oral at bedtime  cefepime   IVPB 1000 milliGRAM(s) IV Intermittent every 12 hours  chlorhexidine 2% Cloths 1 Application(s) Topical daily  cloNIDine 0.1 milliGRAM(s) Oral every 12 hours  collagenase Ointment 1 Application(s) Topical daily  fluticasone propionate/ salmeterol 250-50 MICROgram(s) Diskus 1 Dose(s) Inhalation two times a day  furosemide    Tablet 20 milliGRAM(s) Oral daily  gabapentin 400 milliGRAM(s) Oral two times a day  morphine ER Tablet 15 milliGRAM(s) Oral every 12 hours  polyethylene glycol 3350 17 Gram(s) Oral daily  rivaroxaban 2.5 milliGRAM(s) Oral two times a day  senna 2 Tablet(s) Oral at bedtime  sodium chloride 3%  Inhalation 4 milliLiter(s) Inhalation every 6 hours  thiamine 100 milliGRAM(s) Oral daily  tiotropium 2.5 MICROgram(s) Inhaler 2 Puff(s) Inhalation daily  trimethoprim  160 mG/sulfamethoxazole 800 mG 1 Tablet(s) Oral two times a day      Assessment:  79F w/ hx of ETOH in past, PAD w/ b/l carotid artery stenosis, HFpEF EF 64% w/ severe AS, COPD, HTN, CAD, RLE DVT 8/2023 w/ hx of R hip infection p/w worsening R hip pain and hypoxic resp failure    Recs:  cardiac stable  no e/o acs  cw antiplatelet, statin and antianginals for cad/stent. denies chest pain. hold off on ischemic eval for now   vol status stable =hold lasix due to LAI  s/p echo, results noted. mod to severe AS. doubt would be a TAVR candidate in setting of chronic hip infection and risk of endocarditis  tx for copd per pulmonary  cw xarelto 2.5mg bid for PAD dosing and "ppx for hx of VTE"   pain control  abx per ID  f/u ortho recs        Over 25 minutes spent on total encounter; more than 50% of the visit was spent counseling and/or coordinating care by the attending physician.      Chaka Lawrence MD   Cardiovascular Disease  [phone]

## 2025-02-11 NOTE — PROGRESS NOTE ADULT - ASSESSMENT
_________________________________________________________________________________________  ========>>  M E D I C A L   A T T E N D I N G    F O L L O W  U P  N O T E  <<=========  -----------------------------------------------------------------------------------------------------    - Patient evaluated by me, chart reviewed.   no major events reported / noted otherwise     ==================>> REVIEW OF SYSTEM <<=================    GEN: no fever, no chills,   RESP: no SOB, no cough, no sputum  CVS: no chest pain, no palpitations  GI: no abdominal pain, no nausea  : no dysuria, no frequency  Neuro: no headache, no dizziness    ==================>> PHYSICAL EXAM <<=================    GEN: A&O X 3 , NAD , comfortable, as above .. in bed .. encouraged out of bed to chair with assistance as needed.   HEENT: NCAT, PERRL, MMM, hearing intact  CVS: S1S2 , regular , No M/R/G appreciated  PULM: scattered wheez >> decreased / improved   ABD.: soft. non tender, non distended,  Extrem: intact pulses , leg edema decreased : stasis changes , wounds dressed        ( Note written / Date of service 02-11-25 ( This is certified to be the same as "ENTERED" date above ( for billing purposes)))    ==================>> MEDICATIONS <<====================    albuterol/ipratropium for Nebulization 3 milliLiter(s) Nebulizer every 6 hours  atorvastatin 40 milliGRAM(s) Oral at bedtime  cefepime   IVPB 1000 milliGRAM(s) IV Intermittent every 12 hours  chlorhexidine 2% Cloths 1 Application(s) Topical daily  cloNIDine 0.1 milliGRAM(s) Oral every 12 hours  collagenase Ointment 1 Application(s) Topical daily  fluticasone propionate/ salmeterol 250-50 MICROgram(s) Diskus 1 Dose(s) Inhalation two times a day  gabapentin 400 milliGRAM(s) Oral two times a day  morphine ER Tablet 15 milliGRAM(s) Oral every 12 hours  polyethylene glycol 3350 17 Gram(s) Oral daily  rivaroxaban 2.5 milliGRAM(s) Oral two times a day  senna 2 Tablet(s) Oral at bedtime  sodium chloride 3%  Inhalation 4 milliLiter(s) Inhalation every 6 hours  thiamine 100 milliGRAM(s) Oral daily  tiotropium 2.5 MICROgram(s) Inhaler 2 Puff(s) Inhalation daily  trimethoprim  160 mG/sulfamethoxazole 800 mG 1 Tablet(s) Oral two times a day    MEDICATIONS  (PRN):  acetaminophen     Tablet .. 650 milliGRAM(s) Oral every 6 hours PRN Temp greater or equal to 38C (100.4F), Mild Pain (1 - 3)  melatonin 3 milliGRAM(s) Oral at bedtime PRN Insomnia  naloxone Injectable 0.2 milliGRAM(s) IV Push every 3 minutes PRN respiratory depression/ suspected opioid OD  nicotine  Polacrilex Gum 4 milliGRAM(s) Oral four times a day PRN Smoking Cessation  ondansetron Injectable 4 milliGRAM(s) IV Push every 8 hours PRN Nausea and/or Vomiting  oxyCODONE    IR 5 milliGRAM(s) Oral every 4 hours PRN Severe Pain (7 - 10)    ___________  Active diet:  Diet, DASH/TLC:   Sodium & Cholesterol Restricted  ___________________    ==================>> VITAL SIGNS <<==================    Vital Signs Last 24 HrsT(C): 36.8 (02-11-25 @ 12:12)  T(F): 98.3 (02-11-25 @ 12:12), Max: 98.3 (02-11-25 @ 12:12)  HR: 85 (02-11-25 @ 12:12) (69 - 85)  BP: 135/63 (02-11-25 @ 12:12)  RR: 18 (02-11-25 @ 12:12) (18 - 18)  SpO2: 94% (02-11-25 @ 12:12) (92% - 95%)       ==================>> LAB AND IMAGING <<==================                        10.0   5.76  )-----------( 288      ( 10 Feb 2025 07:20 )             32.9        02-11    135  |  99  |  55[H]  ----------------------------<  81  5.5[H]   |  27  |  1.46[H]    Ca    9.2      11 Feb 2025 07:12      Creatinine:  1.46  <<==, 1.64  <<==, 1.27  <<==  Sodium:   135  <==, 135  <==, 134  <==       AST:          14(01-30) <== , 15(01-27) <== , 24(01-27) <==      ALT:        8(01-30)  <== , 11(01-27)  <== , 15(01-27)  <==      AP:        80(01-30)  <=, 109(01-27)  <=, 121(01-27)  <=     Bili:        0.4(01-30)  <=, 0.3(01-27)  <=, 0.3(01-27)  <=    ____________________________    M I C R O B I O L O G Y :    Culture - Sputum (collected 07 Feb 2025 18:19)  Source: .Sputum Sputum  Gram Stain (08 Feb 2025 06:20):    Numerous polymorphonuclear leukocytes per low power field    Rare Squamous epithelial cells per low power field    Numerous Gram Negative Rods seen per oil power field    Moderate Gram positive cocci in pairs seen per oil power field    Moderate Yeast likecells seen per oil power field    Few Gram Positive Rods seen per oil power field  Final Report (09 Feb 2025 11:29):    Mixed gram negative rods    Commensal [place] consistent with body site      < from: TTE W or WO Ultrasound Enhancing Agent (01.28.25 @ 13:55) >  CONCLUSIONS:    1. Left ventricular cavity is small. Left ventricular wall thickness is normal. Left ventricular systolic function is normal with an ejection fraction of 73 % by Rios's method of disks.   2. Mildly enlarged right ventricular cavity size, with normal wall thickness, and normal right ventricular systolic function.   3. Moderate to severe aortic stenosis.   4. Moderate tricuspid regurgitation.   5. Estimated pulmonary artery systolic pressure is 75 mmHg, consistent with severe pulmonary hypertension.   6. No pericardial effusion seen.   7. Compared to the transthoracic echocardiogram performed on 4/12/2023, there have been no significant interval changes.  < end of copied text >    < from: CT Angio Chest PE Protocol w/ IV Cont (01.29.25 @ 08:47) >  IMPRESSION:  No pulmonary embolism.  Right upper lobe groundglass opacities and right lower lobe nodular   opacities are likely infectious/inflammatory.  Small bilateral pleural effusions.  < end of copied text >    < from: [facility] Duplex Lower Ext Vein Scan, Bilat (01.27.25 @ 20:06) >  IMPRESSION:  No evidence of deep venous thrombosis in either lower extremity.  < end of copied text >    < from: Xray Knee 3 Views, Right (01.27.25 @ 23:18) >  IMPRESSION:  1.  Right longstem femoral revision hardware with lucency surrounding the   hardware and marked subsidence in keeping with loosening.  2.  Lucency at the lateral cortex of the proximal femur may reflect   postoperative change versus osteolysis.  3.  Pseudoarticulation between the proximal femur and right iliac bone  < end of copied text >    < from: [facility] Duplex Lower Ext Vein Scan, Bilat (01.27.25 @ 20:06) >  IMPRESSION:  No evidence of deep venous thrombosis in either lower extremity.  < end of copied text >    < from: TTE W or WO Ultrasound Enhancing Agent (06.17.24 @ 17:12) >  CONCLUSIONS:    1. Left ventricular cavity is normal in size. Left ventricular wall thickness is normal. Left ventricular systolic function is normal with an ejection fraction of 64 % by Rios's method of disks. There are no regional wall motion abnormalities seen.   2. There is mild (grade 1) left ventricular diastolic dysfunction.   3. Normal right ventricular cavity size and normal systolic function.   4. Structurally normal mitral valve with normal leaflet excursion. There is calcification of the mitral valve annulus. There is mild mitral regurgitation.   5. The aortic valve anatomy cannot be determined with reduced systolic excursion. There is calcification of the aortic valve leaflets. There is severe aortic stenosis. The peak transaortic velocity is 3.94 m/s, peak transaortic gradient is 62.1 mmHg and mean transaortic gradient is 32.0 mmHg with an LVOT/aortic valve VTI ratio of 0.22. The aorticvalve area is estimated at 0.51 cm² by the continuity equation. There is mild to moderate aortic regurgitation.   6. The left atrium is mildly dilated with an indexed volume of 41 ml/m².   7. No prior echocardiogram is available for comparison.  < end of copied text >    ___________________________________________________________________________________  ===============>>  A S S E S S M E N T   A N D   P L A N <<===============  ------------------------------------------------------------------------------------------    79F w/ hx of ETOH in past, PAD w/ b/l carotid artery stenosis, HFpEF EF 64% w/ severe AS, COPD, HTN, CAD, RLE DVT 8/2023 w/ hx of R hip infection p/w worsening R hip pain     Problem/Plan - 1:  ·  Problem: right hip pain.   ·  Plan: Patient endorsing severe R hip pain w/o inciting fall or traumatic event. 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 06/2024 admission with possible joint infection. No clear signs of infection currently and exam is equivocal. -Pain control as ordered [noting there is some substance use history]     >> pain management follow up and further management            Bowel regimen as needed   -cultures as above   - Ortho following , noted   - ID appreciated :     >>>  Levofloxacin 750 mg po q48hrs ( msec on 1/27) and Bactrim 1 DS po q8hrs to complete 6 weeks course (till 3/13)       -would do Bactrim 1 DS po daily for chronic suppression of R hip PJI instead of doxycycline there after   >> outpatient ID follow up      Problem/Plan - 2:  ·  Problem: acute exacerbation of Chronic heart failure with preserved ejection fraction (HFpEF), edema, elevated BNP     in patient with severe Aortic stenosis as above   repeat TTE as above with AS, MR, PAH  diuretics per cardio ( patient declined some doses before)   -Daily weight and I/O  cardio following   Xarelto 2.5 mg BID per cardio    CTA and Duplex negative for VTE      Problem/Plan - 3:  ·  Problem: COPD   ? has 02 PRN at home. Patient smoking tobacco >> quitting   -Duonebs Q6hrs  -Cont. Symbicort BID  -Cont. Spiriva  - pulm appreciated     ## pneumonia on CT as above >> post antibiotics >> monitor     lung sounds and breathing better      Problem/Plan - 4:  ·  Problem: CAD (coronary artery disease).   ·  Plan: Patient states she does not take aspirin or atorvastatin at home.  cardio following, appreciated     Problem/Plan - 5:  ·  Problem: EtOH dependence.   no signs of withdrawal      Problem/Plan - 6:  ·  Problem: history of Severe aortic stenosis.   repeat echo as above : unchanged   cardio appreciated     Problem/Plan - 7:  ·  Problem: Essential hypertension.   ·  Plan: -Trend BP  -Cont. Clonidine BID  -Patient does not recall taking Nifedipine, can resume prior dose if BPs uncontrolled.     Problem/Plan - 8:  ·  Problem: Prophylactic measure.   ·  Plan: DVT PPx  xarelto     DISCHARGE PLANING / placement       ___________________________  H. GONZALO Perez.  Pager: [phone]

## 2025-02-12 LAB
ANION GAP SERPL CALC-SCNC: 13 MMOL/L — SIGNIFICANT CHANGE UP (ref 5–17)
BUN SERPL-MCNC: 60 MG/DL — HIGH (ref 7–23)
CALCIUM SERPL-MCNC: 9.3 MG/DL — SIGNIFICANT CHANGE UP (ref 8.4–10.5)
CHLORIDE SERPL-SCNC: 98 MMOL/L — SIGNIFICANT CHANGE UP (ref 96–108)
CO2 SERPL-SCNC: 21 MMOL/L — LOW (ref 22–31)
CREAT SERPL-MCNC: 1.39 MG/DL — HIGH (ref 0.5–1.3)
EGFR: 39 ML/MIN/1.73M2 — LOW
GLUCOSE SERPL-MCNC: 93 MG/DL — SIGNIFICANT CHANGE UP (ref 70–99)
POTASSIUM SERPL-MCNC: 5.6 MMOL/L — HIGH (ref 3.5–5.3)
POTASSIUM SERPL-SCNC: 5.6 MMOL/L — HIGH (ref 3.5–5.3)
SODIUM SERPL-SCNC: 132 MMOL/L — LOW (ref 135–145)

## 2025-02-12 RX ORDER — OXYCODONE HYDROCHLORIDE 30 MG/1
5 TABLET ORAL EVERY 4 HOURS
Refills: 0 | Status: DISCONTINUED | OUTPATIENT
Start: 2025-02-12 | End: 2025-02-17

## 2025-02-12 RX ORDER — ATORVASTATIN CALCIUM 80 MG/1
1 TABLET, FILM COATED ORAL
Qty: 0 | Refills: 0 | DISCHARGE
Start: 2025-02-12

## 2025-02-12 RX ORDER — RIVAROXABAN 10 MG/1
1 TABLET, FILM COATED ORAL
Qty: 0 | Refills: 0 | DISCHARGE
Start: 2025-02-12

## 2025-02-12 RX ORDER — BUTALBITAL, ACETAMINOPHEN AND CAFFEINE 50; 325; 40 MG/1; MG/1; MG/1
1 TABLET ORAL
Refills: 0 | DISCHARGE

## 2025-02-12 RX ORDER — SENNA 187 MG
2 TABLET ORAL
Qty: 0 | Refills: 0 | DISCHARGE
Start: 2025-02-12

## 2025-02-12 RX ORDER — POLYETHYLENE GLYCOL 3350 17 G/17G
17 POWDER, FOR SOLUTION ORAL
Qty: 0 | Refills: 0 | DISCHARGE
Start: 2025-02-12

## 2025-02-12 RX ORDER — GABAPENTIN 400 MG/1
1 CAPSULE ORAL
Qty: 0 | Refills: 0 | DISCHARGE
Start: 2025-02-12

## 2025-02-12 RX ORDER — ACETAMINOPHEN 500 MG/5ML
2 LIQUID (ML) ORAL
Qty: 0 | Refills: 0 | DISCHARGE
Start: 2025-02-12

## 2025-02-12 RX ORDER — SULFAMETHOXAZOLE/TRIMETHOPRIM 800-160 MG
1 TABLET ORAL
Qty: 0 | Refills: 0 | DISCHARGE
Start: 2025-02-12

## 2025-02-12 RX ORDER — OXYCODONE HYDROCHLORIDE AND ACETAMINOPHEN 10; 325 MG/1; MG/1
1 TABLET ORAL
Refills: 0 | DISCHARGE

## 2025-02-12 RX ORDER — COLLAGENASE CLOSTRIDIUM HIST. 250 UNIT/G
1 OINTMENT (GRAM) TOPICAL
Qty: 0 | Refills: 0 | DISCHARGE
Start: 2025-02-12

## 2025-02-12 RX ORDER — IPRATROPIUM BROMIDE AND ALBUTEROL SULFATE .5; 2.5 MG/3ML; MG/3ML
3 SOLUTION RESPIRATORY (INHALATION)
Qty: 0 | Refills: 0 | DISCHARGE
Start: 2025-02-12

## 2025-02-12 RX ORDER — NALOXONE HYDROCHLORIDE 0.4 MG/ML
2 INJECTION, SOLUTION INTRAMUSCULAR; INTRAVENOUS; SUBCUTANEOUS
Qty: 0 | Refills: 0 | DISCHARGE
Start: 2025-02-12

## 2025-02-12 RX ORDER — SODIUM ZIRCONIUM CYCLOSILICATE 5 G/5G
10 POWDER, FOR SUSPENSION ORAL ONCE
Refills: 0 | Status: COMPLETED | OUTPATIENT
Start: 2025-02-12 | End: 2025-02-12

## 2025-02-12 RX ORDER — MELATONIN 5 MG
1 TABLET ORAL
Qty: 0 | Refills: 0 | DISCHARGE
Start: 2025-02-12

## 2025-02-12 RX ADMIN — CEFEPIME 100 MILLIGRAM(S): 2 INJECTION, POWDER, FOR SOLUTION INTRAVENOUS at 17:44

## 2025-02-12 RX ADMIN — Medication 100 MILLIGRAM(S): at 17:43

## 2025-02-12 RX ADMIN — POLYETHYLENE GLYCOL 3350 17 GRAM(S): 17 POWDER, FOR SOLUTION ORAL at 17:44

## 2025-02-12 RX ADMIN — Medication 1 DOSE(S): at 17:48

## 2025-02-12 RX ADMIN — Medication 4 MILLILITER(S): at 17:48

## 2025-02-12 RX ADMIN — RIVAROXABAN 2.5 MILLIGRAM(S): 10 TABLET, FILM COATED ORAL at 06:47

## 2025-02-12 RX ADMIN — Medication 3 MILLIGRAM(S): at 23:54

## 2025-02-12 RX ADMIN — GABAPENTIN 400 MILLIGRAM(S): 400 CAPSULE ORAL at 10:08

## 2025-02-12 RX ADMIN — OXYCODONE HYDROCHLORIDE 5 MILLIGRAM(S): 30 TABLET ORAL at 02:43

## 2025-02-12 RX ADMIN — Medication 1 TABLET(S): at 06:46

## 2025-02-12 RX ADMIN — GABAPENTIN 400 MILLIGRAM(S): 400 CAPSULE ORAL at 17:43

## 2025-02-12 RX ADMIN — Medication 1 APPLICATION(S): at 11:59

## 2025-02-12 RX ADMIN — Medication 15 MILLIGRAM(S): at 17:43

## 2025-02-12 RX ADMIN — Medication 4 MILLILITER(S): at 23:54

## 2025-02-12 RX ADMIN — CEFEPIME 100 MILLIGRAM(S): 2 INJECTION, POWDER, FOR SOLUTION INTRAVENOUS at 06:47

## 2025-02-12 RX ADMIN — Medication 650 MILLIGRAM(S): at 02:42

## 2025-02-12 RX ADMIN — Medication 15 MILLIGRAM(S): at 11:08

## 2025-02-12 RX ADMIN — Medication 1 TABLET(S): at 17:44

## 2025-02-12 RX ADMIN — Medication 0.1 MILLIGRAM(S): at 17:43

## 2025-02-12 RX ADMIN — Medication 1 APPLICATION(S): at 12:25

## 2025-02-12 RX ADMIN — OXYCODONE HYDROCHLORIDE 5 MILLIGRAM(S): 30 TABLET ORAL at 06:14

## 2025-02-12 RX ADMIN — Medication 70 MILLILITER(S): at 06:47

## 2025-02-12 RX ADMIN — Medication 15 MILLIGRAM(S): at 18:43

## 2025-02-12 RX ADMIN — Medication 3 MILLIGRAM(S): at 02:43

## 2025-02-12 RX ADMIN — SODIUM ZIRCONIUM CYCLOSILICATE 10 GRAM(S): 5 POWDER, FOR SUSPENSION ORAL at 11:59

## 2025-02-12 RX ADMIN — IPRATROPIUM BROMIDE AND ALBUTEROL SULFATE 3 MILLILITER(S): .5; 2.5 SOLUTION RESPIRATORY (INHALATION) at 17:52

## 2025-02-12 RX ADMIN — Medication 650 MILLIGRAM(S): at 06:14

## 2025-02-12 RX ADMIN — TIOTROPIUM BROMIDE INHALATION SPRAY 2 PUFF(S): 3.12 SPRAY, METERED RESPIRATORY (INHALATION) at 12:00

## 2025-02-12 RX ADMIN — Medication 15 MILLIGRAM(S): at 10:08

## 2025-02-12 RX ADMIN — IPRATROPIUM BROMIDE AND ALBUTEROL SULFATE 3 MILLILITER(S): .5; 2.5 SOLUTION RESPIRATORY (INHALATION) at 23:54

## 2025-02-12 RX ADMIN — RIVAROXABAN 2.5 MILLIGRAM(S): 10 TABLET, FILM COATED ORAL at 17:43

## 2025-02-12 RX ADMIN — ATORVASTATIN CALCIUM 40 MILLIGRAM(S): 80 TABLET, FILM COATED ORAL at 23:54

## 2025-02-12 RX ADMIN — OXYCODONE HYDROCHLORIDE 5 MILLIGRAM(S): 30 TABLET ORAL at 23:54

## 2025-02-12 NOTE — PROGRESS NOTE ADULT - ASSESSMENT
_________________________________________________________________________________________  ========>>  M E D I C A L   A T T E N D I N G    F O L L O W  U P  N O T E  <<=========  -----------------------------------------------------------------------------------------------------    - Patient evaluated by me, chart reviewed.   no major events reported / noted otherwise   patient asking  to have ortho sx done.. noted discussion with ortho and pt's daughter..     ==================>> REVIEW OF SYSTEM <<=================    GEN: no fever, no chills,   RESP: no SOB, no cough, no sputum  CVS: no chest pain, no palpitations  GI: no abdominal pain, no nausea  : no dysuria, no frequency  Neuro: no headache, no dizziness    ==================>> PHYSICAL EXAM <<=================    GEN: A&O X 3 , NAD , comfortable, as above .. in bed .. encouraged increased activity   HEENT: NCAT, PERRL, MMM, hearing intact  CVS: S1S2 , regular , No M/R/G appreciated  PULM: scattered wheez >> decreased / improved   ABD.: soft. non tender, non distended,  Extrem: intact pulses , leg edema decreased : stasis changes , wounds dressed        ( Note written / Date of service 02-11-25 ( This is certified to be the same as "ENTERED" date above ( for billing purposes)))    ==================>> MEDICATIONS <<====================    albuterol/ipratropium for Nebulization 3 milliLiter(s) Nebulizer every 6 hours  atorvastatin 40 milliGRAM(s) Oral at bedtime  cefepime   IVPB 1000 milliGRAM(s) IV Intermittent every 12 hours  chlorhexidine 2% Cloths 1 Application(s) Topical daily  cloNIDine 0.1 milliGRAM(s) Oral every 12 hours  collagenase Ointment 1 Application(s) Topical daily  fluticasone propionate/ salmeterol 250-50 MICROgram(s) Diskus 1 Dose(s) Inhalation two times a day  gabapentin 400 milliGRAM(s) Oral two times a day  morphine ER Tablet 15 milliGRAM(s) Oral every 12 hours  polyethylene glycol 3350 17 Gram(s) Oral daily  rivaroxaban 2.5 milliGRAM(s) Oral two times a day  senna 2 Tablet(s) Oral at bedtime  sodium chloride 3%  Inhalation 4 milliLiter(s) Inhalation every 6 hours  thiamine 100 milliGRAM(s) Oral daily  tiotropium 2.5 MICROgram(s) Inhaler 2 Puff(s) Inhalation daily  trimethoprim  160 mG/sulfamethoxazole 800 mG 1 Tablet(s) Oral two times a day    MEDICATIONS  (PRN):  acetaminophen     Tablet .. 650 milliGRAM(s) Oral every 6 hours PRN Temp greater or equal to 38C (100.4F), Mild Pain (1 - 3)  melatonin 3 milliGRAM(s) Oral at bedtime PRN Insomnia  naloxone Injectable 0.2 milliGRAM(s) IV Push every 3 minutes PRN respiratory depression/ suspected opioid OD  nicotine  Polacrilex Gum 4 milliGRAM(s) Oral four times a day PRN Smoking Cessation  ondansetron Injectable 4 milliGRAM(s) IV Push every 8 hours PRN Nausea and/or Vomiting  oxyCODONE    IR 5 milliGRAM(s) Oral every 4 hours PRN Severe Pain (7 - 10)    ___________  Active diet:  Diet, DASH/TLC:   Sodium & Cholesterol Restricted  ___________________    ==================>> VITAL SIGNS <<==================    Vital Signs Last 24 HrsT(C): 36.8 (02-11-25 @ 12:12)  T(F): 98.3 (02-11-25 @ 12:12), Max: 98.3 (02-11-25 @ 12:12)  HR: 85 (02-11-25 @ 12:12) (69 - 85)  BP: 135/63 (02-11-25 @ 12:12)  RR: 18 (02-11-25 @ 12:12) (18 - 18)  SpO2: 94% (02-11-25 @ 12:12) (92% - 95%)       ==================>> LAB AND IMAGING <<==================                        10.0   5.76  )-----------( 288      ( 10 Feb 2025 07:20 )             32.9        02-11    135  |  99  |  55[H]  ----------------------------<  81  5.5[H]   |  27  |  1.46[H]    Ca    9.2      11 Feb 2025 07:12      Creatinine:  1.46  <<==, 1.64  <<==, 1.27  <<==  Sodium:   135  <==, 135  <==, 134  <==       AST:          14(01-30) <== , 15(01-27) <== , 24(01-27) <==      ALT:        8(01-30)  <== , 11(01-27)  <== , 15(01-27)  <==      AP:        80(01-30)  <=, 109(01-27)  <=, 121(01-27)  <=     Bili:        0.4(01-30)  <=, 0.3(01-27)  <=, 0.3(01-27)  <=    ____________________________    M I C R O B I O L O G Y :    Culture - Sputum (collected 07 Feb 2025 18:19)  Source: .Sputum Sputum  Gram Stain (08 Feb 2025 06:20):    Numerous polymorphonuclear leukocytes per low power field    Rare Squamous epithelial cells per low power field    Numerous Gram Negative Rods seen per oil power field    Moderate Gram positive cocci in pairs seen per oil power field    Moderate Yeast likecells seen per oil power field    Few Gram Positive Rods seen per oil power field  Final Report (09 Feb 2025 11:29):    Mixed gram negative rods    Commensal [place] consistent with body site      < from: TTE W or WO Ultrasound Enhancing Agent (01.28.25 @ 13:55) >  CONCLUSIONS:    1. Left ventricular cavity is small. Left ventricular wall thickness is normal. Left ventricular systolic function is normal with an ejection fraction of 73 % by Rios's method of disks.   2. Mildly enlarged right ventricular cavity size, with normal wall thickness, and normal right ventricular systolic function.   3. Moderate to severe aortic stenosis.   4. Moderate tricuspid regurgitation.   5. Estimated pulmonary artery systolic pressure is 75 mmHg, consistent with severe pulmonary hypertension.   6. No pericardial effusion seen.   7. Compared to the transthoracic echocardiogram performed on 4/12/2023, there have been no significant interval changes.  < end of copied text >    < from: CT Angio Chest PE Protocol w/ IV Cont (01.29.25 @ 08:47) >  IMPRESSION:  No pulmonary embolism.  Right upper lobe groundglass opacities and right lower lobe nodular   opacities are likely infectious/inflammatory.  Small bilateral pleural effusions.  < end of copied text >    < from: VA Duplex Lower Ext Vein Scan, Bilat (01.27.25 @ 20:06) >  IMPRESSION:  No evidence of deep venous thrombosis in either lower extremity.  < end of copied text >    < from: Xray Knee 3 Views, Right (01.27.25 @ 23:18) >  IMPRESSION:  1.  Right longstem femoral revision hardware with lucency surrounding the   hardware and marked subsidence in keeping with loosening.  2.  Lucency at the lateral cortex of the proximal femur may reflect   postoperative change versus osteolysis.  3.  Pseudoarticulation between the proximal femur and right iliac bone  < end of copied text >    < from: VA Duplex Lower Ext Vein Scan, Bilat (01.27.25 @ 20:06) >  IMPRESSION:  No evidence of deep venous thrombosis in either lower extremity.  < end of copied text >    < from: TTE W or WO Ultrasound Enhancing Agent (06.17.24 @ 17:12) >  CONCLUSIONS:    1. Left ventricular cavity is normal in size. Left ventricular wall thickness is normal. Left ventricular systolic function is normal with an ejection fraction of 64 % by Rios's method of disks. There are no regional wall motion abnormalities seen.   2. There is mild (grade 1) left ventricular diastolic dysfunction.   3. Normal right ventricular cavity size and normal systolic function.   4. Structurally normal mitral valve with normal leaflet excursion. There is calcification of the mitral valve annulus. There is mild mitral regurgitation.   5. The aortic valve anatomy cannot be determined with reduced systolic excursion. There is calcification of the aortic valve leaflets. There is severe aortic stenosis. The peak transaortic velocity is 3.94 m/s, peak transaortic gradient is 62.1 mmHg and mean transaortic gradient is 32.0 mmHg with an LVOT/aortic valve VTI ratio of 0.22. The aorticvalve area is estimated at 0.51 cm² by the continuity equation. There is mild to moderate aortic regurgitation.   6. The left atrium is mildly dilated with an indexed volume of 41 ml/m².   7. No prior echocardiogram is available for comparison.  < end of copied text >    ___________________________________________________________________________________  ===============>>  A S S E S S M E N T   A N D   P L A N <<===============  ------------------------------------------------------------------------------------------    79F w/ hx of ETOH in past, PAD w/ b/l carotid artery stenosis, HFpEF EF 64% w/ severe AS, COPD, HTN, CAD, RLE DVT 8/2023 w/ hx of R hip infection p/w worsening R hip pain     Problem/Plan - 1:  ·  Problem: right hip pain.   ·  Plan: Patient endorsing severe R hip pain w/o inciting fall or traumatic event. 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 06/2024 admission with possible joint infection. No clear signs of infection currently and exam is equivocal. -Pain control as ordered [noting there is some substance use history]     >> pain management follow up and further management            Bowel regimen as needed   -cultures as above   - Ortho following , noted   - ID appreciated :     >>>  Levofloxacin 750 mg po q48hrs ( msec on 1/27) and Bactrim 1 DS po q8hrs to complete 6 weeks course (till 3/13)       -would do Bactrim 1 DS po daily for chronic suppression of R hip PJI instead of doxycycline there after   >> outpatient ID and ortho follow up      Problem/Plan - 2:  ·  Problem: acute exacerbation of Chronic heart failure with preserved ejection fraction (HFpEF), edema, elevated BNP     in patient with severe Aortic stenosis as above   repeat TTE as above with AS, MR, PAH  diuretics per cardio ( patient declined some doses before)   -Daily weight and I/O  cardio following   Xarelto 2.5 mg BID per cardio    CTA and Duplex negative for VTE      Problem/Plan - 3:  ·  Problem: COPD   ? has 02 PRN at home. Patient smoking tobacco >> quitting   -Duonebs Q6hrs  -Cont. Symbicort BID  -Cont. Spiriva  - pulm appreciated     ## pneumonia on CT as above >> post antibiotics >> monitor     lung sounds and breathing better      Problem/Plan - 4:  ·  Problem: CAD (coronary artery disease).   ·  Plan: Patient states she does not take aspirin or atorvastatin at home.  cardio following, appreciated     Problem/Plan - 5:  ·  Problem: history of Severe aortic stenosis.   repeat echo as noted : unchanged   cardio appreciated     Problem/Plan - 6:  ·  Problem: Essential hypertension.   ·  Plan: -Trend BP  -Cont. Clonidine BID  -Patient does not recall taking Nifedipine, can resume prior dose if BPs uncontrolled.     Problem/Plan - 7:  ·  Problem: Prophylactic measure.   ·  Plan: DVT PPx  xarelto     DISCHARGE PLANING / placement     ___________________________  H. GONZALO Perez.  Pager: [phone]

## 2025-02-13 RX ORDER — ACETAMINOPHEN 500 MG/5ML
1000 LIQUID (ML) ORAL ONCE
Refills: 0 | Status: COMPLETED | OUTPATIENT
Start: 2025-02-13 | End: 2025-02-13

## 2025-02-13 RX ORDER — WHITE PETROLATUM 1 G/G
1 OINTMENT TOPICAL DAILY
Refills: 0 | Status: DISCONTINUED | OUTPATIENT
Start: 2025-02-13 | End: 2025-02-17

## 2025-02-13 RX ADMIN — CEFEPIME 100 MILLIGRAM(S): 2 INJECTION, POWDER, FOR SOLUTION INTRAVENOUS at 06:08

## 2025-02-13 RX ADMIN — CEFEPIME 100 MILLIGRAM(S): 2 INJECTION, POWDER, FOR SOLUTION INTRAVENOUS at 17:34

## 2025-02-13 RX ADMIN — OXYCODONE HYDROCHLORIDE 5 MILLIGRAM(S): 30 TABLET ORAL at 18:03

## 2025-02-13 RX ADMIN — OXYCODONE HYDROCHLORIDE 5 MILLIGRAM(S): 30 TABLET ORAL at 12:42

## 2025-02-13 RX ADMIN — Medication 4 MILLILITER(S): at 17:33

## 2025-02-13 RX ADMIN — Medication 15 MILLIGRAM(S): at 17:33

## 2025-02-13 RX ADMIN — OXYCODONE HYDROCHLORIDE 5 MILLIGRAM(S): 30 TABLET ORAL at 12:12

## 2025-02-13 RX ADMIN — OXYCODONE HYDROCHLORIDE 5 MILLIGRAM(S): 30 TABLET ORAL at 17:33

## 2025-02-13 RX ADMIN — IPRATROPIUM BROMIDE AND ALBUTEROL SULFATE 3 MILLILITER(S): .5; 2.5 SOLUTION RESPIRATORY (INHALATION) at 12:07

## 2025-02-13 RX ADMIN — Medication 4 MILLILITER(S): at 06:14

## 2025-02-13 RX ADMIN — Medication 4 MILLILITER(S): at 12:07

## 2025-02-13 RX ADMIN — Medication 15 MILLIGRAM(S): at 06:08

## 2025-02-13 RX ADMIN — RIVAROXABAN 2.5 MILLIGRAM(S): 10 TABLET, FILM COATED ORAL at 17:33

## 2025-02-13 RX ADMIN — TIOTROPIUM BROMIDE INHALATION SPRAY 2 PUFF(S): 3.12 SPRAY, METERED RESPIRATORY (INHALATION) at 12:17

## 2025-02-13 RX ADMIN — Medication 1 DOSE(S): at 17:38

## 2025-02-13 RX ADMIN — WHITE PETROLATUM 1 APPLICATION(S): 1 OINTMENT TOPICAL at 12:08

## 2025-02-13 RX ADMIN — GABAPENTIN 400 MILLIGRAM(S): 400 CAPSULE ORAL at 17:33

## 2025-02-13 RX ADMIN — Medication 1 APPLICATION(S): at 12:08

## 2025-02-13 RX ADMIN — Medication 15 MILLIGRAM(S): at 18:03

## 2025-02-13 RX ADMIN — IPRATROPIUM BROMIDE AND ALBUTEROL SULFATE 3 MILLILITER(S): .5; 2.5 SOLUTION RESPIRATORY (INHALATION) at 17:33

## 2025-02-13 RX ADMIN — Medication 1 TABLET(S): at 06:08

## 2025-02-13 RX ADMIN — RIVAROXABAN 2.5 MILLIGRAM(S): 10 TABLET, FILM COATED ORAL at 06:07

## 2025-02-13 RX ADMIN — Medication 1 DOSE(S): at 06:14

## 2025-02-13 RX ADMIN — GABAPENTIN 400 MILLIGRAM(S): 400 CAPSULE ORAL at 06:07

## 2025-02-13 RX ADMIN — Medication 1 TABLET(S): at 17:33

## 2025-02-13 RX ADMIN — Medication 0.1 MILLIGRAM(S): at 17:34

## 2025-02-13 RX ADMIN — IPRATROPIUM BROMIDE AND ALBUTEROL SULFATE 3 MILLILITER(S): .5; 2.5 SOLUTION RESPIRATORY (INHALATION) at 06:09

## 2025-02-13 NOTE — PROGRESS NOTE ADULT - SUBJECTIVE AND OBJECTIVE BOX
Cardiovascular Disease Progress Note    Overnight events: No acute events overnight. no new cardiac sx   Otherwise review of systems negative    Objective Findings:  T(C): 37 (02-13-25 @ 06:20), Max: 37.4 (02-12-25 @ 18:02)  HR: 70 (02-13-25 @ 06:20) (70 - 110)  BP: 107/61 (02-13-25 @ 06:20) (106/58 - 183/67)  RR: 18 (02-13-25 @ 06:20) (18 - 18)  SpO2: 94% (02-13-25 @ 06:20) (92% - 96%)  Wt(kg): --  Daily     Daily       Physical Exam:  Gen: NAD  HEENT: EOMI  CV: RRR, normal S1 + S2, no m/r/g  Lungs: CTAB  Abd: soft, non-tender  Ext: No edema    Telemetry:    Laboratory Data:    02-12    132[L]  |  98  |  60[H]  ----------------------------<  93  5.6[H]   |  21[L]  |  1.39[H]    Ca    9.3      12 Feb 2025 07:11                Inpatient Medications:  MEDICATIONS  (STANDING):  albuterol/ipratropium for Nebulization 3 milliLiter(s) Nebulizer every 6 hours  atorvastatin 40 milliGRAM(s) Oral at bedtime  cefepime   IVPB 1000 milliGRAM(s) IV Intermittent every 12 hours  chlorhexidine 2% Cloths 1 Application(s) Topical daily  cloNIDine 0.1 milliGRAM(s) Oral every 12 hours  collagenase Ointment 1 Application(s) Topical daily  fluticasone propionate/ salmeterol 250-50 MICROgram(s) Diskus 1 Dose(s) Inhalation two times a day  gabapentin 400 milliGRAM(s) Oral two times a day  morphine ER Tablet 15 milliGRAM(s) Oral every 12 hours  polyethylene glycol 3350 17 Gram(s) Oral daily  rivaroxaban 2.5 milliGRAM(s) Oral two times a day  senna 2 Tablet(s) Oral at bedtime  sodium chloride 3%  Inhalation 4 milliLiter(s) Inhalation every 6 hours  thiamine 100 milliGRAM(s) Oral daily  tiotropium 2.5 MICROgram(s) Inhaler 2 Puff(s) Inhalation daily  trimethoprim  160 mG/sulfamethoxazole 800 mG 1 Tablet(s) Oral two times a day      Assessment:  79F w/ hx of ETOH in past, PAD w/ b/l carotid artery stenosis, HFpEF EF 64% w/ severe AS, COPD, HTN, CAD, RLE DVT 8/2023 w/ hx of R hip infection p/w worsening R hip pain and hypoxic resp failure    Recs:  cardiac stable  no e/o acs  cw antiplatelet, statin and antianginals for cad/stent. denies chest pain. hold off on ischemic eval for now   vol status stable =cont to hold lasix for now and likely resume on qod basis once renal function normalizes  s/p echo, results noted. mod to severe AS. doubt would be a TAVR candidate in setting of chronic hip infection and risk of endocarditis  tx for copd per pulmonary  cw xarelto 2.5mg bid for PAD dosing and "ppx for hx of VTE"   pain control  abx per ID  f/u ortho recs. patient insisting she would like to have a surgical intervention done      Over 25 minutes spent on total encounter; more than 50% of the visit was spent counseling and/or coordinating care by the attending physician.      Chaka Lawrence MD   Cardiovascular Disease  [phone] Recommendation Preamble: The following recommendations were made during the visit: routine self skin examinations. Render Risk Assessment In Note?: no Detail Level: Zone

## 2025-02-13 NOTE — PROGRESS NOTE ADULT - ASSESSMENT
_________________________________________________________________________________________  ========>>  M E D I C A L   A T T E N D I N G    F O L L O W  U P  N O T E  <<=========  -----------------------------------------------------------------------------------------------------    - Patient evaluated by me, chart reviewed.   no major events reported / noted otherwise     ==================>> REVIEW OF SYSTEM <<=================    GEN: no fever, no chills,   RESP: no SOB, no cough, no sputum  CVS: no chest pain, no palpitations  GI: no abdominal pain, no nausea  : no dysuria, no frequency  Neuro: no headache, no dizziness    ==================>> PHYSICAL EXAM <<=================    GEN: A&O X 3 , NAD , comfortable, as above .. in bed .. encouraged increased activity   HEENT: NCAT, PERRL, MMM, hearing intact  CVS: S1S2 , regular , No M/R/G appreciated  PULM: lung sounds improved   ABD.: soft. non tender, non distended,  Extrem: intact pulses , leg edema decreased : stasis changes , wounds dressed ( examined with RN changing dressing, wounds have improved..        ( Note written / Date of service 02-13-25 ( This is certified to be the same as "ENTERED" date above ( for billing purposes)))    ==================>> MEDICATIONS <<====================    albuterol/ipratropium for Nebulization 3 milliLiter(s) Nebulizer every 6 hours  AQUAPHOR (petrolatum Ointment) 1 Application(s) Topical daily  atorvastatin 40 milliGRAM(s) Oral at bedtime  cefepime   IVPB 1000 milliGRAM(s) IV Intermittent every 12 hours  chlorhexidine 2% Cloths 1 Application(s) Topical daily  cloNIDine 0.1 milliGRAM(s) Oral every 12 hours  collagenase Ointment 1 Application(s) Topical daily  fluticasone propionate/ salmeterol 250-50 MICROgram(s) Diskus 1 Dose(s) Inhalation two times a day  gabapentin 400 milliGRAM(s) Oral two times a day  morphine ER Tablet 15 milliGRAM(s) Oral every 12 hours  polyethylene glycol 3350 17 Gram(s) Oral daily  rivaroxaban 2.5 milliGRAM(s) Oral two times a day  senna 2 Tablet(s) Oral at bedtime  sodium chloride 3%  Inhalation 4 milliLiter(s) Inhalation every 6 hours  thiamine 100 milliGRAM(s) Oral daily  tiotropium 2.5 MICROgram(s) Inhaler 2 Puff(s) Inhalation daily  trimethoprim  160 mG/sulfamethoxazole 800 mG 1 Tablet(s) Oral two times a day    MEDICATIONS  (PRN):  acetaminophen     Tablet .. 650 milliGRAM(s) Oral every 6 hours PRN Temp greater or equal to 38C (100.4F), Mild Pain (1 - 3)  melatonin 3 milliGRAM(s) Oral at bedtime PRN Insomnia  naloxone Injectable 0.2 milliGRAM(s) IV Push every 3 minutes PRN respiratory depression/ suspected opioid OD  nicotine  Polacrilex Gum 4 milliGRAM(s) Oral four times a day PRN Smoking Cessation  ondansetron Injectable 4 milliGRAM(s) IV Push every 8 hours PRN Nausea and/or Vomiting  oxyCODONE    IR 5 milliGRAM(s) Oral every 4 hours PRN Severe Pain (7 - 10)    ___________  Active diet:  Diet, DASH/TLC:   Sodium & Cholesterol Restricted  ___________________    ==================>> VITAL SIGNS <<==================    Vital Signs Last 24 HrsT(C): 37 (02-13-25 @ 06:20)  T(F): 98.6 (02-13-25 @ 06:20), Max: 99.3 (02-12-25 @ 18:02)  HR: 70 (02-13-25 @ 06:20) (70 - 93)  BP: 107/61 (02-13-25 @ 06:20)  RR: 18 (02-13-25 @ 06:20) (18 - 18)  SpO2: 94% (02-13-25 @ 06:20) (92% - 96%)       ==================>> LAB AND IMAGING <<==================       02-12    132[L]  |  98  |  60[H]  ----------------------------<  93  5.6[H]   |  21[L]  |  1.39[H]    Ca    9.3      12 Feb 2025 07:11      Creatinine:  1.39  <<==, 1.46  <<==, 1.64  <<==  Sodium:   132  <==, 135  <==, 135  <==    ____________________________    M I C R O B I O L O G Y :    Culture - Sputum (collected 07 Feb 2025 18:19)  Source: .Sputum Sputum  Gram Stain (08 Feb 2025 06:20):    Numerous polymorphonuclear leukocytes per low power field    Rare Squamous epithelial cells per low power field    Numerous Gram Negative Rods seen per oil power field    Moderate Gram positive cocci in pairs seen per oil power field    Moderate Yeast likecells seen per oil power field    Few Gram Positive Rods seen per oil power field  Final Report (09 Feb 2025 11:29):    Mixed gram negative rods    Commensal [place] consistent with body site      < from: TTE W or WO Ultrasound Enhancing Agent (01.28.25 @ 13:55) >  CONCLUSIONS:    1. Left ventricular cavity is small. Left ventricular wall thickness is normal. Left ventricular systolic function is normal with an ejection fraction of 73 % by Rios's method of disks.   2. Mildly enlarged right ventricular cavity size, with normal wall thickness, and normal right ventricular systolic function.   3. Moderate to severe aortic stenosis.   4. Moderate tricuspid regurgitation.   5. Estimated pulmonary artery systolic pressure is 75 mmHg, consistent with severe pulmonary hypertension.   6. No pericardial effusion seen.   7. Compared to the transthoracic echocardiogram performed on 4/12/2023, there have been no significant interval changes.  < end of copied text >    < from: CT Angio Chest PE Protocol w/ IV Cont (01.29.25 @ 08:47) >  IMPRESSION:  No pulmonary embolism.  Right upper lobe groundglass opacities and right lower lobe nodular   opacities are likely infectious/inflammatory.  Small bilateral pleural effusions.  < end of copied text >    < from: [facility] Duplex Lower Ext Vein Scan, Bilat (01.27.25 @ 20:06) >  IMPRESSION:  No evidence of deep venous thrombosis in either lower extremity.  < end of copied text >    < from: Xray Knee 3 Views, Right (01.27.25 @ 23:18) >  IMPRESSION:  1.  Right longstem femoral revision hardware with lucency surrounding the   hardware and marked subsidence in keeping with loosening.  2.  Lucency at the lateral cortex of the proximal femur may reflect   postoperative change versus osteolysis.  3.  Pseudoarticulation between the proximal femur and right iliac bone  < end of copied text >    < from: VA Duplex Lower Ext Vein Scan, Bilat (01.27.25 @ 20:06) >  IMPRESSION:  No evidence of deep venous thrombosis in either lower extremity.  < end of copied text >    < from: TTE W or WO Ultrasound Enhancing Agent (06.17.24 @ 17:12) >  CONCLUSIONS:    1. Left ventricular cavity is normal in size. Left ventricular wall thickness is normal. Left ventricular systolic function is normal with an ejection fraction of 64 % by Rios's method of disks. There are no regional wall motion abnormalities seen.   2. There is mild (grade 1) left ventricular diastolic dysfunction.   3. Normal right ventricular cavity size and normal systolic function.   4. Structurally normal mitral valve with normal leaflet excursion. There is calcification of the mitral valve annulus. There is mild mitral regurgitation.   5. The aortic valve anatomy cannot be determined with reduced systolic excursion. There is calcification of the aortic valve leaflets. There is severe aortic stenosis. The peak transaortic velocity is 3.94 m/s, peak transaortic gradient is 62.1 mmHg and mean transaortic gradient is 32.0 mmHg with an LVOT/aortic valve VTI ratio of 0.22. The aorticvalve area is estimated at 0.51 cm² by the continuity equation. There is mild to moderate aortic regurgitation.   6. The left atrium is mildly dilated with an indexed volume of 41 ml/m².   7. No prior echocardiogram is available for comparison.  < end of copied text >    ___________________________________________________________________________________  ===============>>  A S S E S S M E N T   A N D   P L A N <<===============  ------------------------------------------------------------------------------------------    79F w/ hx of ETOH in past, PAD w/ b/l carotid artery stenosis, HFpEF EF 64% w/ severe AS, COPD, HTN, CAD, RLE DVT 8/2023 w/ hx of R hip infection p/w worsening R hip pain     Problem/Plan - 1:  ·  Problem: right hip pain.   ·  Plan: Patient endorsing severe R hip pain w/o inciting fall or traumatic event. 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 06/2024 admission with possible joint infection. No clear signs of infection currently and exam is equivocal. -Pain control as ordered [noting there is some substance use history]     >> pain management follow up and further management            Bowel regimen as needed   -cultures as above   - Ortho following   - ID appreciated :     >>>  Levofloxacin 750 mg po q48hrs ( msec on 1/27) and Bactrim 1 DS po q8hrs to complete 6 weeks course (till 3/13)       -would do Bactrim 1 DS po daily for chronic suppression of R hip PJI instead of doxycycline there after   >> outpatient ID and ortho follow up      Problem/Plan - 2:  ·  Problem: acute exacerbation of Chronic heart failure with preserved ejection fraction (HFpEF), edema, elevated BNP     in patient with severe Aortic stenosis as above   repeat TTE as above with AS, MR, PAH  diuretics per cardio ( patient declined some doses before)   -Daily weight and I/O  cardio following   Xarelto 2.5 mg BID per cardio    CTA and Duplex negative for VTE     monitor hyponatremia free fluid restriction / increase diet ..     Problem/Plan - 3:  ·  Problem: COPD   ? has 02 PRN at home. Patient smoking tobacco >> quitting   -Duonebs Q6hrs  -Cont. Symbicort BID  -Cont. Spiriva  - pulm appreciated     ## pneumonia on CT as above >> post antibiotics >> monitor     lung sounds and breathing better      Problem/Plan - 4:  ·  Problem: CAD (coronary artery disease).   ·  Plan: Patient states she does not take aspirin or atorvastatin at home.  cardio following, appreciated     Problem/Plan - 5:  ·  Problem: history of Severe aortic stenosis.   repeat echo as noted : unchanged   cardio appreciated     Problem/Plan - 6:  ·  Problem: Essential hypertension.   ·  Plan: -Trend BP  -Cont. Clonidine BID  -Patient does not recall taking Nifedipine, can resume prior dose if BPs uncontrolled.     Problem/Plan - 7:  ·  Problem: Prophylactic measure.   ·  Plan: DVT PPx  xarelto     DISCHARGE PLANING / placement otherwise     ___________________________  H. GONZALO Perez.  Pager: [phone]

## 2025-02-14 LAB
ANION GAP SERPL CALC-SCNC: 10 MMOL/L — SIGNIFICANT CHANGE UP (ref 5–17)
BUN SERPL-MCNC: 43 MG/DL — HIGH (ref 7–23)
CALCIUM SERPL-MCNC: 8.9 MG/DL — SIGNIFICANT CHANGE UP (ref 8.4–10.5)
CHLORIDE SERPL-SCNC: 101 MMOL/L — SIGNIFICANT CHANGE UP (ref 96–108)
CO2 SERPL-SCNC: 24 MMOL/L — SIGNIFICANT CHANGE UP (ref 22–31)
CREAT SERPL-MCNC: 1.12 MG/DL — SIGNIFICANT CHANGE UP (ref 0.5–1.3)
EGFR: 51 ML/MIN/1.73M2 — LOW
GLUCOSE SERPL-MCNC: 116 MG/DL — HIGH (ref 70–99)
POTASSIUM SERPL-MCNC: 5.5 MMOL/L — HIGH (ref 3.5–5.3)
POTASSIUM SERPL-SCNC: 5.5 MMOL/L — HIGH (ref 3.5–5.3)
SODIUM SERPL-SCNC: 135 MMOL/L — SIGNIFICANT CHANGE UP (ref 135–145)

## 2025-02-14 PROCEDURE — 71045 X-RAY EXAM CHEST 1 VIEW: CPT | Mod: 26

## 2025-02-14 RX ORDER — SODIUM ZIRCONIUM CYCLOSILICATE 5 G/5G
10 POWDER, FOR SUSPENSION ORAL ONCE
Refills: 0 | Status: COMPLETED | OUTPATIENT
Start: 2025-02-14 | End: 2025-02-14

## 2025-02-14 RX ORDER — WHITE PETROLATUM 1 G/G
1 OINTMENT TOPICAL
Qty: 0 | Refills: 0 | DISCHARGE
Start: 2025-02-14

## 2025-02-14 RX ORDER — NICOTINE POLACRILEX 4 MG/1
4 GUM, CHEWING ORAL
Qty: 0 | Refills: 0 | DISCHARGE
Start: 2025-02-14

## 2025-02-14 RX ORDER — SODIUM ZIRCONIUM CYCLOSILICATE 5 G/5G
10 POWDER, FOR SUSPENSION ORAL
Qty: 0 | Refills: 0 | DISCHARGE
Start: 2025-02-14

## 2025-02-14 RX ORDER — OXYCODONE HYDROCHLORIDE 30 MG/1
1 TABLET ORAL
Qty: 0 | Refills: 0 | DISCHARGE
Start: 2025-02-14

## 2025-02-14 RX ORDER — ACETAMINOPHEN 500 MG/5ML
1000 LIQUID (ML) ORAL ONCE
Refills: 0 | Status: COMPLETED | OUTPATIENT
Start: 2025-02-14 | End: 2025-02-14

## 2025-02-14 RX ADMIN — Medication 400 MILLIGRAM(S): at 00:01

## 2025-02-14 RX ADMIN — SODIUM ZIRCONIUM CYCLOSILICATE 10 GRAM(S): 5 POWDER, FOR SUSPENSION ORAL at 13:01

## 2025-02-14 RX ADMIN — RIVAROXABAN 2.5 MILLIGRAM(S): 10 TABLET, FILM COATED ORAL at 17:32

## 2025-02-14 RX ADMIN — CEFEPIME 100 MILLIGRAM(S): 2 INJECTION, POWDER, FOR SOLUTION INTRAVENOUS at 18:56

## 2025-02-14 RX ADMIN — OXYCODONE HYDROCHLORIDE 5 MILLIGRAM(S): 30 TABLET ORAL at 22:40

## 2025-02-14 RX ADMIN — OXYCODONE HYDROCHLORIDE 5 MILLIGRAM(S): 30 TABLET ORAL at 16:23

## 2025-02-14 RX ADMIN — Medication 4 MILLILITER(S): at 17:33

## 2025-02-14 RX ADMIN — IPRATROPIUM BROMIDE AND ALBUTEROL SULFATE 3 MILLILITER(S): .5; 2.5 SOLUTION RESPIRATORY (INHALATION) at 17:33

## 2025-02-14 RX ADMIN — POLYETHYLENE GLYCOL 3350 17 GRAM(S): 17 POWDER, FOR SOLUTION ORAL at 11:42

## 2025-02-14 RX ADMIN — Medication 0.1 MILLIGRAM(S): at 17:32

## 2025-02-14 RX ADMIN — GABAPENTIN 400 MILLIGRAM(S): 400 CAPSULE ORAL at 06:22

## 2025-02-14 RX ADMIN — Medication 2 TABLET(S): at 22:38

## 2025-02-14 RX ADMIN — OXYCODONE HYDROCHLORIDE 5 MILLIGRAM(S): 30 TABLET ORAL at 04:12

## 2025-02-14 RX ADMIN — Medication 1000 MILLIGRAM(S): at 01:01

## 2025-02-14 RX ADMIN — CEFEPIME 100 MILLIGRAM(S): 2 INJECTION, POWDER, FOR SOLUTION INTRAVENOUS at 06:23

## 2025-02-14 RX ADMIN — Medication 4 MILLILITER(S): at 06:24

## 2025-02-14 RX ADMIN — RIVAROXABAN 2.5 MILLIGRAM(S): 10 TABLET, FILM COATED ORAL at 06:22

## 2025-02-14 RX ADMIN — TIOTROPIUM BROMIDE INHALATION SPRAY 2 PUFF(S): 3.12 SPRAY, METERED RESPIRATORY (INHALATION) at 13:01

## 2025-02-14 RX ADMIN — Medication 1 TABLET(S): at 17:32

## 2025-02-14 RX ADMIN — GABAPENTIN 400 MILLIGRAM(S): 400 CAPSULE ORAL at 17:33

## 2025-02-14 RX ADMIN — Medication 1 APPLICATION(S): at 11:59

## 2025-02-14 RX ADMIN — Medication 400 MILLIGRAM(S): at 17:33

## 2025-02-14 RX ADMIN — OXYCODONE HYDROCHLORIDE 5 MILLIGRAM(S): 30 TABLET ORAL at 11:42

## 2025-02-14 RX ADMIN — IPRATROPIUM BROMIDE AND ALBUTEROL SULFATE 3 MILLILITER(S): .5; 2.5 SOLUTION RESPIRATORY (INHALATION) at 13:01

## 2025-02-14 RX ADMIN — Medication 1 DOSE(S): at 18:56

## 2025-02-14 RX ADMIN — Medication 1 TABLET(S): at 06:23

## 2025-02-14 RX ADMIN — Medication 15 MILLIGRAM(S): at 06:21

## 2025-02-14 RX ADMIN — Medication 3 MILLIGRAM(S): at 22:38

## 2025-02-14 RX ADMIN — WHITE PETROLATUM 1 APPLICATION(S): 1 OINTMENT TOPICAL at 11:59

## 2025-02-14 RX ADMIN — Medication 15 MILLIGRAM(S): at 17:32

## 2025-02-14 RX ADMIN — Medication 0.1 MILLIGRAM(S): at 06:22

## 2025-02-14 RX ADMIN — IPRATROPIUM BROMIDE AND ALBUTEROL SULFATE 3 MILLILITER(S): .5; 2.5 SOLUTION RESPIRATORY (INHALATION) at 06:24

## 2025-02-14 RX ADMIN — ATORVASTATIN CALCIUM 40 MILLIGRAM(S): 80 TABLET, FILM COATED ORAL at 22:38

## 2025-02-14 NOTE — PROGRESS NOTE ADULT - SUBJECTIVE AND OBJECTIVE BOX
Cardiovascular Disease Progress Note    Overnight events: No acute events overnight.  no new cardiac sx  Otherwise review of systems negative    Objective Findings:  T(C): 36.4 (02-14-25 @ 04:32), Max: 37 (02-13-25 @ 12:16)  HR: 82 (02-14-25 @ 04:32) (80 - 82)  BP: 136/70 (02-14-25 @ 04:32) (130/60 - 146/64)  RR: 18 (02-14-25 @ 04:32) (18 - 18)  SpO2: 93% (02-14-25 @ 04:32) (93% - 94%)  Wt(kg): --  Daily     Daily       Physical Exam:  Gen: NAD  HEENT: EOMI  CV: RRR, normal S1 + S2, no m/r/g  Lungs: CTAB  Abd: soft, non-tender  Ext: No edema    Telemetry:    Laboratory Data:    02-12    132[L]  |  98  |  60[H]  ----------------------------<  93  5.6[H]   |  21[L]  |  1.39[H]    Ca    9.3      12 Feb 2025 07:11                Inpatient Medications:  MEDICATIONS  (STANDING):  albuterol/ipratropium for Nebulization 3 milliLiter(s) Nebulizer every 6 hours  AQUAPHOR (petrolatum Ointment) 1 Application(s) Topical daily  atorvastatin 40 milliGRAM(s) Oral at bedtime  cefepime   IVPB 1000 milliGRAM(s) IV Intermittent every 12 hours  chlorhexidine 2% Cloths 1 Application(s) Topical daily  cloNIDine 0.1 milliGRAM(s) Oral every 12 hours  collagenase Ointment 1 Application(s) Topical daily  fluticasone propionate/ salmeterol 250-50 MICROgram(s) Diskus 1 Dose(s) Inhalation two times a day  gabapentin 400 milliGRAM(s) Oral two times a day  morphine ER Tablet 15 milliGRAM(s) Oral every 12 hours  polyethylene glycol 3350 17 Gram(s) Oral daily  rivaroxaban 2.5 milliGRAM(s) Oral two times a day  senna 2 Tablet(s) Oral at bedtime  sodium chloride 3%  Inhalation 4 milliLiter(s) Inhalation every 6 hours  thiamine 100 milliGRAM(s) Oral daily  tiotropium 2.5 MICROgram(s) Inhaler 2 Puff(s) Inhalation daily  trimethoprim  160 mG/sulfamethoxazole 800 mG 1 Tablet(s) Oral two times a day      Assessment:  79F w/ hx of ETOH in past, PAD w/ b/l carotid artery stenosis, HFpEF EF 64% w/ severe AS, COPD, HTN, CAD, RLE DVT 8/2023 w/ hx of R hip infection p/w worsening R hip pain and hypoxic resp failure    Recs:  cardiac stable  no e/o acs  cw antiplatelet, statin and antianginals for cad/stent. denies chest pain. hold off on ischemic eval for now   vol status stable =cont to hold lasix for now and likely resume on qod basis once renal function normalizes  s/p echo, results noted. mod to severe AS. doubt would be a TAVR candidate in setting of chronic hip infection and risk of endocarditis  tx for copd per pulmonary  cw xarelto 2.5mg bid for PAD dosing and "ppx for hx of VTE"   pain control  long term abx per ID  f/u ortho recs      Over 25 minutes spent on total encounter; more than 50% of the visit was spent counseling and/or coordinating care by the attending physician.      Chaka Lawrence MD   Cardiovascular Disease  [phone]

## 2025-02-14 NOTE — PROGRESS NOTE ADULT - ASSESSMENT
_________________________________________________________________________________________  ========>>  M E D I C A L   A T T E N D I N G    F O L L O W  U P  N O T E  <<=========  -----------------------------------------------------------------------------------------------------    - Patient evaluated by me, chart reviewed.   no major events reported / noted otherwise     ==================>> REVIEW OF SYSTEM <<=================    GEN: no fever, no chills,   RESP: no SOB, no cough, no sputum  CVS: no chest pain, no palpitations  GI: no abdominal pain, no nausea  : no dysuria, no frequency  Neuro: no headache, no dizziness    ==================>> PHYSICAL EXAM <<=================    GEN: A&O X 3 , NAD , comfortable, as above .. in bed .. encouraged increased activity   HEENT: NCAT, PERRL, MMM, hearing intact  CVS: S1S2 , regular , No M/R/G appreciated  PULM: lung sounds improved   ABD.: soft. non tender, non distended,  Extrem: intact pulses , leg edema decreased : stasis changes , wounds dressed ( examined with RN changing dressing, wounds have improved..        ( Note written / Date of service 02-14-25 ( This is certified to be the same as "ENTERED" date above ( for billing purposes)))    ==================>> MEDICATIONS <<====================    albuterol/ipratropium for Nebulization 3 milliLiter(s) Nebulizer every 6 hours  AQUAPHOR (petrolatum Ointment) 1 Application(s) Topical daily  atorvastatin 40 milliGRAM(s) Oral at bedtime  cefepime   IVPB 1000 milliGRAM(s) IV Intermittent every 12 hours  chlorhexidine 2% Cloths 1 Application(s) Topical daily  cloNIDine 0.1 milliGRAM(s) Oral every 12 hours  collagenase Ointment 1 Application(s) Topical daily  fluticasone propionate/ salmeterol 250-50 MICROgram(s) Diskus 1 Dose(s) Inhalation two times a day  gabapentin 400 milliGRAM(s) Oral two times a day  morphine ER Tablet 15 milliGRAM(s) Oral every 12 hours  polyethylene glycol 3350 17 Gram(s) Oral daily  rivaroxaban 2.5 milliGRAM(s) Oral two times a day  senna 2 Tablet(s) Oral at bedtime  sodium chloride 3%  Inhalation 4 milliLiter(s) Inhalation every 6 hours  thiamine 100 milliGRAM(s) Oral daily  tiotropium 2.5 MICROgram(s) Inhaler 2 Puff(s) Inhalation daily  trimethoprim  160 mG/sulfamethoxazole 800 mG 1 Tablet(s) Oral two times a day    MEDICATIONS  (PRN):  acetaminophen     Tablet .. 650 milliGRAM(s) Oral every 6 hours PRN Temp greater or equal to 38C (100.4F), Mild Pain (1 - 3)  melatonin 3 milliGRAM(s) Oral at bedtime PRN Insomnia  naloxone Injectable 0.2 milliGRAM(s) IV Push every 3 minutes PRN respiratory depression/ suspected opioid OD  nicotine  Polacrilex Gum 4 milliGRAM(s) Oral four times a day PRN Smoking Cessation  ondansetron Injectable 4 milliGRAM(s) IV Push every 8 hours PRN Nausea and/or Vomiting  oxyCODONE    IR 5 milliGRAM(s) Oral every 4 hours PRN Severe Pain (7 - 10)    ___________  Active diet:  Diet, DASH/TLC:   Sodium & Cholesterol Restricted  1500mL Fluid Restriction ([id number])  Supplement Feeding Modality:  Oral  Ensure Pudding Cans or Servings Per Day:  1       Frequency:  Two Times a day  ___________________    ==================>> VITAL SIGNS <<==================    Vital Signs Last 24 HrsT(C): 36.6 (02-14-25 @ 11:37)  T(F): 97.8 (02-14-25 @ 11:37), Max: 97.8 (02-14-25 @ 11:37)  HR: 76 (02-14-25 @ 11:37) (76 - 82)  BP: 110/60 (02-14-25 @ 11:37)  RR: 18 (02-14-25 @ 11:37) (18 - 18)  SpO2: 96% (02-14-25 @ 11:37) (93% - 96%)       ==================>> LAB AND IMAGING <<==================       02-14    135  |  101  |  43[H]  ----------------------------<  116[H]  5.5[H]   |  24  |  1.12    Ca    8.9      14 Feb 2025 08:33    Creatinine:  1.12  <<==, 1.39  <<==, 1.46  <<==, 1.64  <<==  Sodium:   135  <==, 132  <==, 135  <==, 135  <==    ____________________________    M I C R O B I O L O G Y :    Culture - Sputum (collected 07 Feb 2025 18:19)  Source: .Sputum Sputum  Gram Stain (08 Feb 2025 06:20):    Numerous polymorphonuclear leukocytes per low power field    Rare Squamous epithelial cells per low power field    Numerous Gram Negative Rods seen per oil power field    Moderate Gram positive cocci in pairs seen per oil power field    Moderate Yeast likecells seen per oil power field    Few Gram Positive Rods seen per oil power field  Final Report (09 Feb 2025 11:29):    Mixed gram negative rods    Commensal [place] consistent with body site        < from: TTE W or WO Ultrasound Enhancing Agent (01.28.25 @ 13:55) >  CONCLUSIONS:    1. Left ventricular cavity is small. Left ventricular wall thickness is normal. Left ventricular systolic function is normal with an ejection fraction of 73 % by Rios's method of disks.   2. Mildly enlarged right ventricular cavity size, with normal wall thickness, and normal right ventricular systolic function.   3. Moderate to severe aortic stenosis.   4. Moderate tricuspid regurgitation.   5. Estimated pulmonary artery systolic pressure is 75 mmHg, consistent with severe pulmonary hypertension.   6. No pericardial effusion seen.   7. Compared to the transthoracic echocardiogram performed on 4/12/2023, there have been no significant interval changes.  < end of copied text >    < from: CT Angio Chest PE Protocol w/ IV Cont (01.29.25 @ 08:47) >  IMPRESSION:  No pulmonary embolism.  Right upper lobe groundglass opacities and right lower lobe nodular   opacities are likely infectious/inflammatory.  Small bilateral pleural effusions.  < end of copied text >      < from: Xray Knee 3 Views, Right (01.27.25 @ 23:18) >  IMPRESSION:  1.  Right longstem femoral revision hardware with lucency surrounding the   hardware and marked subsidence in keeping with loosening.  2.  Lucency at the lateral cortex of the proximal femur may reflect   postoperative change versus osteolysis.  3.  Pseudoarticulation between the proximal femur and right iliac bone  < end of copied text >    < from: TTE W or WO Ultrasound Enhancing Agent (06.17.24 @ 17:12) >  CONCLUSIONS:    1. Left ventricular cavity is normal in size. Left ventricular wall thickness is normal. Left ventricular systolic function is normal with an ejection fraction of 64 % by Rios's method of disks. There are no regional wall motion abnormalities seen.   2. There is mild (grade 1) left ventricular diastolic dysfunction.   3. Normal right ventricular cavity size and normal systolic function.   4. Structurally normal mitral valve with normal leaflet excursion. There is calcification of the mitral valve annulus. There is mild mitral regurgitation.   5. The aortic valve anatomy cannot be determined with reduced systolic excursion. There is calcification of the aortic valve leaflets. There is severe aortic stenosis. The peak transaortic velocity is 3.94 m/s, peak transaortic gradient is 62.1 mmHg and mean transaortic gradient is 32.0 mmHg with an LVOT/aortic valve VTI ratio of 0.22. The aorticvalve area is estimated at 0.51 cm² by the continuity equation. There is mild to moderate aortic regurgitation.   6. The left atrium is mildly dilated with an indexed volume of 41 ml/m².   7. No prior echocardiogram is available for comparison.  < end of copied text >    ___________________________________________________________________________________  ===============>>  A S S E S S M E N T   A N D   P L A N <<===============  ------------------------------------------------------------------------------------------    79F w/ hx of ETOH in past, PAD w/ b/l carotid artery stenosis, HFpEF EF 64% w/ severe AS, COPD, HTN, CAD, RLE DVT 8/2023 w/ hx of R hip infection p/w worsening R hip pain     Problem/Plan - 1:  ·  Problem: right hip pain.   ·  Plan: Patient endorsing severe R hip pain w/o inciting fall or traumatic event. 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 06/2024 admission with possible joint infection. No clear signs of infection currently and exam is equivocal. -Pain control as ordered [noting there is some substance use history]     >> pain management follow up and further management +     Bowel regimen as needed   -cultures as above   - ID appreciated :     >>>  Levofloxacin 750 mg po q48hrs ( msec on 1/27) and Bactrim 1 DS po q8hrs to complete 6 weeks course (till 3/13)       -would do Bactrim 1 DS po daily for chronic suppression of R hip PJI instead of doxycycline there after   >> outpatient ID and ortho follow up      Problem/Plan - 2:  ·  Problem: acute exacerbation of Chronic heart failure with preserved ejection fraction (HFpEF), edema, elevated BNP     in patient with severe Aortic stenosis as above   repeat TTE as above with AS, MR, PAH  diuretics per cardio ( patient declined some doses before)   -Daily weight and I/O  cardio following   Xarelto 2.5 mg BID per cardio    CTA and Duplex negative for VTE     monitor hyponatremia free fluid restriction / increase diet ..     Problem/Plan - 3:  ·  Problem: COPD   ? has 02 PRN at home. Patient smoking tobacco >> quitting   -Duonebs Q6hrs  -Cont. Symbicort BID  -Cont. Spiriva  - pulm appreciated     ## pneumonia on CT as above >> post antibiotics >> monitor     lung sounds and breathing better      Problem/Plan - 4:  ·  Problem: CAD (coronary artery disease).   ·  Plan: Patient states she does not take aspirin or atorvastatin at home.  cardio following, appreciated     Problem/Plan - 5:  ·  Problem: history of Severe aortic stenosis.   repeat echo as noted : unchanged   cardio appreciated     Problem/Plan - 6:  ·  Problem: Essential hypertension.   ·  Plan: -Trend BP  -Cont. Clonidine BID  -Patient does not recall taking Nifedipine, can resume prior dose if BPs uncontrolled.     Problem/Plan - 7:  ·  Problem: Prophylactic measure.   ·  Plan: DVT PPx  xarelto     DISCHARGE PLANING / placement in process..  appreciated     ___________________________  HAbbe Perez D.O.  Pager: [phone]

## 2025-02-14 NOTE — PROVIDER CONTACT NOTE (OTHER) - ASSESSMENT
Pt AOx4. Pt currently screaming in pain. Diluadid IVP administered without relief or change in pain. Pt offered iv tylenol prior to xray R hip. Patient refusing iv tylenol at this time, states "I need something stronger"
Pt Axox2 confused. Pt repeatedly getting out of bed, increased altered mental status. Unable to reorient at all times.
Pt potassium 5.5 denies s/s

## 2025-02-14 NOTE — PROVIDER CONTACT NOTE (OTHER) - ACTION/TREATMENT ORDERED:
PA made aware
Provider made aware, ordered labs and FS and assess pt at the bedside.
Will continue to monitor

## 2025-02-15 LAB
ANION GAP SERPL CALC-SCNC: 10 MMOL/L — SIGNIFICANT CHANGE UP (ref 5–17)
ANION GAP SERPL CALC-SCNC: 9 MMOL/L — SIGNIFICANT CHANGE UP (ref 5–17)
BUN SERPL-MCNC: 39 MG/DL — HIGH (ref 7–23)
BUN SERPL-MCNC: 41 MG/DL — HIGH (ref 7–23)
CALCIUM SERPL-MCNC: 8.8 MG/DL — SIGNIFICANT CHANGE UP (ref 8.4–10.5)
CALCIUM SERPL-MCNC: 9.1 MG/DL — SIGNIFICANT CHANGE UP (ref 8.4–10.5)
CHLORIDE SERPL-SCNC: 100 MMOL/L — SIGNIFICANT CHANGE UP (ref 96–108)
CHLORIDE SERPL-SCNC: 101 MMOL/L — SIGNIFICANT CHANGE UP (ref 96–108)
CO2 SERPL-SCNC: 24 MMOL/L — SIGNIFICANT CHANGE UP (ref 22–31)
CO2 SERPL-SCNC: 25 MMOL/L — SIGNIFICANT CHANGE UP (ref 22–31)
CREAT SERPL-MCNC: 1.15 MG/DL — SIGNIFICANT CHANGE UP (ref 0.5–1.3)
CREAT SERPL-MCNC: 1.17 MG/DL — SIGNIFICANT CHANGE UP (ref 0.5–1.3)
EGFR: 48 ML/MIN/1.73M2 — LOW
EGFR: 49 ML/MIN/1.73M2 — LOW
GLUCOSE SERPL-MCNC: 112 MG/DL — HIGH (ref 70–99)
GLUCOSE SERPL-MCNC: 84 MG/DL — SIGNIFICANT CHANGE UP (ref 70–99)
POTASSIUM SERPL-MCNC: 5.6 MMOL/L — HIGH (ref 3.5–5.3)
POTASSIUM SERPL-MCNC: 5.8 MMOL/L — HIGH (ref 3.5–5.3)
POTASSIUM SERPL-SCNC: 5.6 MMOL/L — HIGH (ref 3.5–5.3)
POTASSIUM SERPL-SCNC: 5.8 MMOL/L — HIGH (ref 3.5–5.3)
SODIUM SERPL-SCNC: 134 MMOL/L — LOW (ref 135–145)
SODIUM SERPL-SCNC: 135 MMOL/L — SIGNIFICANT CHANGE UP (ref 135–145)

## 2025-02-15 RX ORDER — LACTULOSE 10 G/15ML
10 SOLUTION ORAL DAILY
Refills: 0 | Status: DISCONTINUED | OUTPATIENT
Start: 2025-02-16 | End: 2025-02-17

## 2025-02-15 RX ORDER — SODIUM ZIRCONIUM CYCLOSILICATE 5 G/5G
5 POWDER, FOR SUSPENSION ORAL ONCE
Refills: 0 | Status: DISCONTINUED | OUTPATIENT
Start: 2025-02-15 | End: 2025-02-15

## 2025-02-15 RX ORDER — SODIUM ZIRCONIUM CYCLOSILICATE 5 G/5G
10 POWDER, FOR SUSPENSION ORAL ONCE
Refills: 0 | Status: COMPLETED | OUTPATIENT
Start: 2025-02-15 | End: 2025-02-15

## 2025-02-15 RX ORDER — SODIUM ZIRCONIUM CYCLOSILICATE 5 G/5G
5 POWDER, FOR SUSPENSION ORAL ONCE
Refills: 0 | Status: DISCONTINUED | OUTPATIENT
Start: 2025-02-15 | End: 2025-02-17

## 2025-02-15 RX ORDER — QUETIAPINE FUMARATE 25 MG/1
12.5 TABLET ORAL ONCE
Refills: 0 | Status: COMPLETED | OUTPATIENT
Start: 2025-02-15 | End: 2025-02-15

## 2025-02-15 RX ADMIN — Medication 15 MILLIGRAM(S): at 07:04

## 2025-02-15 RX ADMIN — GABAPENTIN 400 MILLIGRAM(S): 400 CAPSULE ORAL at 07:05

## 2025-02-15 RX ADMIN — Medication 1 DOSE(S): at 17:29

## 2025-02-15 RX ADMIN — GABAPENTIN 400 MILLIGRAM(S): 400 CAPSULE ORAL at 17:28

## 2025-02-15 RX ADMIN — Medication 1 TABLET(S): at 17:29

## 2025-02-15 RX ADMIN — TIOTROPIUM BROMIDE INHALATION SPRAY 2 PUFF(S): 3.12 SPRAY, METERED RESPIRATORY (INHALATION) at 11:32

## 2025-02-15 RX ADMIN — Medication 4 MILLILITER(S): at 17:31

## 2025-02-15 RX ADMIN — Medication 0.1 MILLIGRAM(S): at 17:28

## 2025-02-15 RX ADMIN — Medication 1 DOSE(S): at 07:49

## 2025-02-15 RX ADMIN — IPRATROPIUM BROMIDE AND ALBUTEROL SULFATE 3 MILLILITER(S): .5; 2.5 SOLUTION RESPIRATORY (INHALATION) at 11:29

## 2025-02-15 RX ADMIN — IPRATROPIUM BROMIDE AND ALBUTEROL SULFATE 3 MILLILITER(S): .5; 2.5 SOLUTION RESPIRATORY (INHALATION) at 01:08

## 2025-02-15 RX ADMIN — WHITE PETROLATUM 1 APPLICATION(S): 1 OINTMENT TOPICAL at 11:32

## 2025-02-15 RX ADMIN — SODIUM ZIRCONIUM CYCLOSILICATE 10 GRAM(S): 5 POWDER, FOR SUSPENSION ORAL at 09:04

## 2025-02-15 RX ADMIN — OXYCODONE HYDROCHLORIDE 5 MILLIGRAM(S): 30 TABLET ORAL at 11:27

## 2025-02-15 RX ADMIN — Medication 2 TABLET(S): at 22:15

## 2025-02-15 RX ADMIN — CEFEPIME 100 MILLIGRAM(S): 2 INJECTION, POWDER, FOR SOLUTION INTRAVENOUS at 07:05

## 2025-02-15 RX ADMIN — Medication 15 MILLIGRAM(S): at 17:29

## 2025-02-15 RX ADMIN — Medication 1 TABLET(S): at 07:05

## 2025-02-15 RX ADMIN — Medication 1 APPLICATION(S): at 11:31

## 2025-02-15 RX ADMIN — IPRATROPIUM BROMIDE AND ALBUTEROL SULFATE 3 MILLILITER(S): .5; 2.5 SOLUTION RESPIRATORY (INHALATION) at 07:49

## 2025-02-15 RX ADMIN — RIVAROXABAN 2.5 MILLIGRAM(S): 10 TABLET, FILM COATED ORAL at 17:29

## 2025-02-15 RX ADMIN — CEFEPIME 100 MILLIGRAM(S): 2 INJECTION, POWDER, FOR SOLUTION INTRAVENOUS at 17:28

## 2025-02-15 RX ADMIN — IPRATROPIUM BROMIDE AND ALBUTEROL SULFATE 3 MILLILITER(S): .5; 2.5 SOLUTION RESPIRATORY (INHALATION) at 17:28

## 2025-02-15 RX ADMIN — Medication 1 APPLICATION(S): at 11:34

## 2025-02-15 RX ADMIN — Medication 0.1 MILLIGRAM(S): at 07:04

## 2025-02-15 RX ADMIN — Medication 4 MILLILITER(S): at 11:29

## 2025-02-15 RX ADMIN — Medication 3 MILLIGRAM(S): at 22:14

## 2025-02-15 RX ADMIN — QUETIAPINE FUMARATE 12.5 MILLIGRAM(S): 25 TABLET ORAL at 22:15

## 2025-02-15 RX ADMIN — ATORVASTATIN CALCIUM 40 MILLIGRAM(S): 80 TABLET, FILM COATED ORAL at 22:15

## 2025-02-15 RX ADMIN — Medication 4 MILLILITER(S): at 07:05

## 2025-02-15 RX ADMIN — RIVAROXABAN 2.5 MILLIGRAM(S): 10 TABLET, FILM COATED ORAL at 07:05

## 2025-02-15 RX ADMIN — Medication 4 MILLILITER(S): at 01:08

## 2025-02-15 NOTE — PROGRESS NOTE ADULT - ASSESSMENT
79F        hx of ETOH in past, PAD w/ b/l carotid artery stenosis      HFpEF EF 64% w/ severe AS, COPD, HTN, CAD, RLE DVT 8/2023 w/ hx of R hip infection          p/w worsening R hip pain and hypoxic resp failure    CAD/stent. denies chest pain. hold off on ischemic eval for now, per  card     lasix to 20mg po qd      echo,   mod to severe AS.     per  card  doubt would be a TAVR candidate in setting of chronic hip infection and risk of endocarditis   COPD,  rx  per pulmonary   PAD,        cw xarelto 2.5mg bid for PAD dose     MRI R ankle: Lateral soft tissue ankle wound with osteitis of the adjacent fibula. This area is susceptible to developing osteomyelitis. No fluid collection.  -Xray R hip: Right longstem femoral revision hardware with lucency surrounding the hardware and marked subsidence in keeping with loosening.Lucency at the lateral cortex of the proximal femur may reflect postoperative change versus osteolysis.Pseudoarticulation between the proximal femur and right iliac bone    CT angio chest: No pulmonary embolism. Right upper lobe groundglass opacities and right lower lobe nodular opacities are likely infectious/inflammatory. Small bilateral pleural effusions.   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  from bactrichnace rosenberg  to assume  care         RAD< from: TTE W or WO Ultrasound Enhancing Agent (01.28.25 @ 13:55) >  ONCLUSIONS:   1. Left ventricular cavity is small. Left ventricular wall thickness is normal. Left ventricular systolic function is normal with an ejection fraction of 73 % by Rios's method of disks.   2. Mildly enlarged right ventricular cavity size, with normal wall thickness, and normal right ventricular systolic function.   3. Moderate to severe aortic stenosis.   4. Moderate tricuspid regurgitation.   5. Estimated pulmonary artery systolic pressure is 75 mmHg, consistent with severe pulmonary hypertension.   6. No pericardial effusion seen.   7. Compared to the transthoracic echocardiogram performed on 4/12/2023, there have been no significant interval changes.  _________________________________    <

## 2025-02-15 NOTE — PROGRESS NOTE ADULT - SUBJECTIVE AND OBJECTIVE BOX
Cardiovascular Disease Progress Note    Overnight events: No acute events overnight.  no new cardiac sx  Otherwise review of systems negative    Objective Findings:  T(C): 36.4 (02-14-25 @ 21:07), Max: 36.6 (02-14-25 @ 11:37)  HR: 81 (02-14-25 @ 21:07) (76 - 81)  BP: 122/72 (02-14-25 @ 21:07) (110/60 - 122/72)  RR: 18 (02-14-25 @ 11:37) (18 - 18)  SpO2: 96% (02-14-25 @ 11:37) (96% - 96%)  Wt(kg): --  Daily     Daily       Physical Exam:  Gen: NAD  HEENT: EOMI  CV: RRR, normal S1 + S2, no m/r/g  Lungs: CTAB  Abd: soft, non-tender  Ext: No edema    Telemetry:    Laboratory Data:    02-15    135  |  101  |  41[H]  ----------------------------<  84  5.8[H]   |  24  |  1.15    Ca    9.1      15 Feb 2025 06:54                Inpatient Medications:  MEDICATIONS  (STANDING):  albuterol/ipratropium for Nebulization 3 milliLiter(s) Nebulizer every 6 hours  AQUAPHOR (petrolatum Ointment) 1 Application(s) Topical daily  atorvastatin 40 milliGRAM(s) Oral at bedtime  cefepime   IVPB 1000 milliGRAM(s) IV Intermittent every 12 hours  chlorhexidine 2% Cloths 1 Application(s) Topical daily  cloNIDine 0.1 milliGRAM(s) Oral every 12 hours  collagenase Ointment 1 Application(s) Topical daily  fluticasone propionate/ salmeterol 250-50 MICROgram(s) Diskus 1 Dose(s) Inhalation two times a day  gabapentin 400 milliGRAM(s) Oral two times a day  morphine ER Tablet 15 milliGRAM(s) Oral every 12 hours  polyethylene glycol 3350 17 Gram(s) Oral daily  rivaroxaban 2.5 milliGRAM(s) Oral two times a day  senna 2 Tablet(s) Oral at bedtime  sodium chloride 3%  Inhalation 4 milliLiter(s) Inhalation every 6 hours  thiamine 100 milliGRAM(s) Oral daily  tiotropium 2.5 MICROgram(s) Inhaler 2 Puff(s) Inhalation daily  trimethoprim  160 mG/sulfamethoxazole 800 mG 1 Tablet(s) Oral two times a day      Assessment:  79F w/ hx of ETOH in past, PAD w/ b/l carotid artery stenosis, HFpEF EF 64% w/ severe AS, COPD, HTN, CAD, RLE DVT 8/2023 w/ hx of R hip infection p/w worsening R hip pain and hypoxic resp failure    Recs:  cardiac stable  no e/o acs  cw antiplatelet, statin and antianginals for cad/stent. denies chest pain. hold off on ischemic eval for now   vol status stable =cont to hold lasix for now and likely resume on qod basis once renal function normalizes  s/p echo, results noted. mod to severe AS. doubt would be a TAVR candidate in setting of chronic hip infection and risk of endocarditis  tx for copd per pulmonary  cw xarelto 2.5mg bid for PAD dosing and "ppx for hx of VTE"   pain control  long term abx per ID  f/u ortho recs  dcp        Over 25 minutes spent on total encounter; more than 50% of the visit was spent counseling and/or coordinating care by the attending physician.      Chaka Lawrence MD   Cardiovascular Disease  [phone]

## 2025-02-15 NOTE — PROGRESS NOTE ADULT - SUBJECTIVE AND OBJECTIVE BOX
date of service: 02-15-25 @ 12:24  afberile  REVIEW OF SYSTEMS:  CONSTITUTIONAL: No fever,  no  weight loss  ENT:  No  tinnitus,   no   vertigo  NECK: No pain or stiffness  RESPIRATORY: No cough, wheezing, chills or hemoptysis;    No Shortness of Breath  CARDIOVASCULAR: No chest pain, palpitations, dizziness  GASTROINTESTINAL: No abdominal or epigastric pain. No nausea, vomiting, or hematemesis; No diarrhea  No melena or hematochezia.  GENITOURINARY: No dysuria, frequency, hematuria, or incontinence  NEUROLOGICAL: No headaches  SKIN: No itching,  no   rash  LYMPH Nodes: No enlarged glands  ENDOCRINE: No heat or cold intolerance  MUSCULOSKELETAL: No joint pain or swelling  PSYCHIATRIC: No depression, anxiety  HEME/LYMPH: No easy bruising, or bleeding gums  ALLERGY AND IMMUNOLOGIC: No hives or eczema	    MEDICATIONS  (STANDING):  albuterol/ipratropium for Nebulization 3 milliLiter(s) Nebulizer every 6 hours  AQUAPHOR (petrolatum Ointment) 1 Application(s) Topical daily  atorvastatin 40 milliGRAM(s) Oral at bedtime  cefepime   IVPB 1000 milliGRAM(s) IV Intermittent every 12 hours  chlorhexidine 2% Cloths 1 Application(s) Topical daily  cloNIDine 0.1 milliGRAM(s) Oral every 12 hours  collagenase Ointment 1 Application(s) Topical daily  fluticasone propionate/ salmeterol 250-50 MICROgram(s) Diskus 1 Dose(s) Inhalation two times a day  gabapentin 400 milliGRAM(s) Oral two times a day  morphine ER Tablet 15 milliGRAM(s) Oral every 12 hours  rivaroxaban 2.5 milliGRAM(s) Oral two times a day  senna 2 Tablet(s) Oral at bedtime  sodium chloride 3%  Inhalation 4 milliLiter(s) Inhalation every 6 hours  thiamine 100 milliGRAM(s) Oral daily  tiotropium 2.5 MICROgram(s) Inhaler 2 Puff(s) Inhalation daily  trimethoprim  160 mG/sulfamethoxazole 800 mG 1 Tablet(s) Oral two times a day    MEDICATIONS  (PRN):  acetaminophen     Tablet .. 650 milliGRAM(s) Oral every 6 hours PRN Temp greater or equal to 38C (100.4F), Mild Pain (1 - 3)  melatonin 3 milliGRAM(s) Oral at bedtime PRN Insomnia  naloxone Injectable 0.2 milliGRAM(s) IV Push every 3 minutes PRN respiratory depression/ suspected opioid OD  nicotine  Polacrilex Gum 4 milliGRAM(s) Oral four times a day PRN Smoking Cessation  ondansetron Injectable 4 milliGRAM(s) IV Push every 8 hours PRN Nausea and/or Vomiting  oxyCODONE    IR 5 milliGRAM(s) Oral every 4 hours PRN Severe Pain (7 - 10)      Vital Signs Last 24 Hrs  T(C): 36.8 (15 Feb 2025 12:05), Max: 36.8 (15 Feb 2025 12:05)  T(F): 98.2 (15 Feb 2025 12:05), Max: 98.2 (15 Feb 2025 12:05)  HR: 72 (15 Feb 2025 12:05) (72 - 81)  BP: 126/69 (15 Feb 2025 12:05) (122/72 - 128/-)  BP(mean): 65 (15 Feb 2025 06:05) (65 - 65)  RR: 18 (15 Feb 2025 12:05) (18 - 18)  SpO2: 95% (15 Feb 2025 12:05) (95% - 96%)    Parameters below as of 15 Feb 2025 12:05  Patient On (Oxygen Delivery Method): nasal cannula  O2 Flow (L/min): 4    CAPILLARY BLOOD GLUCOSE        I&O's Summary    14 Feb 2025 07:01  -  15 Feb 2025 07:00  --------------------------------------------------------  IN: 200 mL / OUT: 1650 mL / NET: -1450 mL    15 Feb 2025 07:01  -  15 Feb 2025 12:24  --------------------------------------------------------  IN: 510 mL / OUT: 600 mL / NET: -90 mL          Appearance: Normal	  HEENT:   Normal oral mucosa, PERRL, EOMI	  Lymphatic: No lymphadenopathy  Cardiovascular: Normal S1 S2, No JVD  Respiratory: Lungs clear to auscultation	  Gastrointestinal:  Soft, Non-tender, + BS	  Skin: No rash, No ecchymoses	  Extremities:     LABS:    02-15    135  |  101  |  41[H]  ----------------------------<  84  5.8[H]   |  24  |  1.15    Ca    9.1      15 Feb 2025 06:54            Urinalysis Basic - ( 15 Feb 2025 06:54 )    Color: x / Appearance: x / SG: x / pH: x  Gluc: 84 mg/dL / Ketone: x  / Bili: x / Urobili: x   Blood: x / Protein: x / Nitrite: x   Leuk Esterase: x / RBC: x / WBC x   Sq Epi: x / Non Sq Epi: x / Bacteria: x                      Consultant(s) Notes Reviewed:      Care Discussed with Consultants/Other Providers:

## 2025-02-15 NOTE — CHART NOTE - NSCHARTNOTEFT_GEN_A_CORE
Pt with persistent hyperkalemia today in the setting of bactrim. Discussed with Dr. Perez, As renal function improved, recommended no concentrated K diet and continue to monitor lab for now. Per ID, the other option for bactrim would be vanco. Continue bactrim for now per Dr. Perez, [facility] x 1 today. Ensure patient does not become constipated, lactulose added per Dr. Perez.

## 2025-02-16 LAB
ANION GAP SERPL CALC-SCNC: 12 MMOL/L — SIGNIFICANT CHANGE UP (ref 5–17)
BUN SERPL-MCNC: 35 MG/DL — HIGH (ref 7–23)
CALCIUM SERPL-MCNC: 9.1 MG/DL — SIGNIFICANT CHANGE UP (ref 8.4–10.5)
CHLORIDE SERPL-SCNC: 104 MMOL/L — SIGNIFICANT CHANGE UP (ref 96–108)
CO2 SERPL-SCNC: 21 MMOL/L — LOW (ref 22–31)
CREAT SERPL-MCNC: 1.06 MG/DL — SIGNIFICANT CHANGE UP (ref 0.5–1.3)
EGFR: 54 ML/MIN/1.73M2 — LOW
GLUCOSE SERPL-MCNC: 78 MG/DL — SIGNIFICANT CHANGE UP (ref 70–99)
HCT VFR BLD CALC: 30.1 % — LOW (ref 34.5–45)
HGB BLD-MCNC: 9.1 G/DL — LOW (ref 11.5–15.5)
MAGNESIUM SERPL-MCNC: 2.2 MG/DL — SIGNIFICANT CHANGE UP (ref 1.6–2.6)
MCHC RBC-ENTMCNC: 28.4 PG — SIGNIFICANT CHANGE UP (ref 27–34)
MCHC RBC-ENTMCNC: 30.2 G/DL — LOW (ref 32–36)
MCV RBC AUTO: 94.1 FL — SIGNIFICANT CHANGE UP (ref 80–100)
NRBC BLD AUTO-RTO: 0 /100 WBCS — SIGNIFICANT CHANGE UP (ref 0–0)
PHOSPHATE SERPL-MCNC: 3.8 MG/DL — SIGNIFICANT CHANGE UP (ref 2.5–4.5)
PLATELET # BLD AUTO: 286 K/UL — SIGNIFICANT CHANGE UP (ref 150–400)
POTASSIUM SERPL-MCNC: 5.1 MMOL/L — SIGNIFICANT CHANGE UP (ref 3.5–5.3)
POTASSIUM SERPL-SCNC: 5.1 MMOL/L — SIGNIFICANT CHANGE UP (ref 3.5–5.3)
RBC # BLD: 3.2 M/UL — LOW (ref 3.8–5.2)
RBC # FLD: 15.6 % — HIGH (ref 10.3–14.5)
SODIUM SERPL-SCNC: 137 MMOL/L — SIGNIFICANT CHANGE UP (ref 135–145)
WBC # BLD: 4.95 K/UL — SIGNIFICANT CHANGE UP (ref 3.8–10.5)
WBC # FLD AUTO: 4.95 K/UL — SIGNIFICANT CHANGE UP (ref 3.8–10.5)

## 2025-02-16 RX ORDER — ACETAMINOPHEN 500 MG/5ML
1000 LIQUID (ML) ORAL ONCE
Refills: 0 | Status: COMPLETED | OUTPATIENT
Start: 2025-02-16 | End: 2025-02-16

## 2025-02-16 RX ORDER — LACTULOSE 10 G/15ML
15 SOLUTION ORAL
Qty: 0 | Refills: 0 | DISCHARGE
Start: 2025-02-16

## 2025-02-16 RX ADMIN — Medication 1 TABLET(S): at 17:33

## 2025-02-16 RX ADMIN — RIVAROXABAN 2.5 MILLIGRAM(S): 10 TABLET, FILM COATED ORAL at 17:33

## 2025-02-16 RX ADMIN — GABAPENTIN 400 MILLIGRAM(S): 400 CAPSULE ORAL at 17:33

## 2025-02-16 RX ADMIN — IPRATROPIUM BROMIDE AND ALBUTEROL SULFATE 3 MILLILITER(S): .5; 2.5 SOLUTION RESPIRATORY (INHALATION) at 00:24

## 2025-02-16 RX ADMIN — OXYCODONE HYDROCHLORIDE 5 MILLIGRAM(S): 30 TABLET ORAL at 13:55

## 2025-02-16 RX ADMIN — Medication 400 MILLIGRAM(S): at 21:39

## 2025-02-16 RX ADMIN — Medication 1 TABLET(S): at 05:22

## 2025-02-16 RX ADMIN — WHITE PETROLATUM 1 APPLICATION(S): 1 OINTMENT TOPICAL at 11:39

## 2025-02-16 RX ADMIN — OXYCODONE HYDROCHLORIDE 5 MILLIGRAM(S): 30 TABLET ORAL at 20:05

## 2025-02-16 RX ADMIN — Medication 4 MILLILITER(S): at 00:25

## 2025-02-16 RX ADMIN — Medication 4 MILLILITER(S): at 05:21

## 2025-02-16 RX ADMIN — IPRATROPIUM BROMIDE AND ALBUTEROL SULFATE 3 MILLILITER(S): .5; 2.5 SOLUTION RESPIRATORY (INHALATION) at 17:34

## 2025-02-16 RX ADMIN — Medication 1 APPLICATION(S): at 11:43

## 2025-02-16 RX ADMIN — Medication 15 MILLIGRAM(S): at 05:21

## 2025-02-16 RX ADMIN — Medication 1 DOSE(S): at 05:21

## 2025-02-16 RX ADMIN — GABAPENTIN 400 MILLIGRAM(S): 400 CAPSULE ORAL at 05:21

## 2025-02-16 RX ADMIN — CEFEPIME 100 MILLIGRAM(S): 2 INJECTION, POWDER, FOR SOLUTION INTRAVENOUS at 05:20

## 2025-02-16 RX ADMIN — Medication 15 MILLIGRAM(S): at 03:17

## 2025-02-16 RX ADMIN — IPRATROPIUM BROMIDE AND ALBUTEROL SULFATE 3 MILLILITER(S): .5; 2.5 SOLUTION RESPIRATORY (INHALATION) at 11:35

## 2025-02-16 RX ADMIN — Medication 4 MILLILITER(S): at 17:33

## 2025-02-16 RX ADMIN — RIVAROXABAN 2.5 MILLIGRAM(S): 10 TABLET, FILM COATED ORAL at 05:22

## 2025-02-16 RX ADMIN — Medication 1 DOSE(S): at 17:34

## 2025-02-16 RX ADMIN — Medication 4 MILLILITER(S): at 11:35

## 2025-02-16 RX ADMIN — Medication 15 MILLIGRAM(S): at 17:33

## 2025-02-16 RX ADMIN — Medication 3 MILLIGRAM(S): at 21:39

## 2025-02-16 RX ADMIN — Medication 0.1 MILLIGRAM(S): at 06:22

## 2025-02-16 RX ADMIN — OXYCODONE HYDROCHLORIDE 5 MILLIGRAM(S): 30 TABLET ORAL at 14:30

## 2025-02-16 RX ADMIN — CEFEPIME 100 MILLIGRAM(S): 2 INJECTION, POWDER, FOR SOLUTION INTRAVENOUS at 17:34

## 2025-02-16 RX ADMIN — OXYCODONE HYDROCHLORIDE 5 MILLIGRAM(S): 30 TABLET ORAL at 19:28

## 2025-02-16 RX ADMIN — Medication 1000 MILLIGRAM(S): at 22:30

## 2025-02-16 RX ADMIN — Medication 0.1 MILLIGRAM(S): at 17:33

## 2025-02-16 RX ADMIN — TIOTROPIUM BROMIDE INHALATION SPRAY 2 PUFF(S): 3.12 SPRAY, METERED RESPIRATORY (INHALATION) at 11:40

## 2025-02-16 RX ADMIN — Medication 0.1 MILLIGRAM(S): at 05:22

## 2025-02-16 RX ADMIN — IPRATROPIUM BROMIDE AND ALBUTEROL SULFATE 3 MILLILITER(S): .5; 2.5 SOLUTION RESPIRATORY (INHALATION) at 05:21

## 2025-02-16 RX ADMIN — OXYCODONE HYDROCHLORIDE 5 MILLIGRAM(S): 30 TABLET ORAL at 10:10

## 2025-02-16 RX ADMIN — Medication 1 APPLICATION(S): at 11:40

## 2025-02-16 RX ADMIN — OXYCODONE HYDROCHLORIDE 5 MILLIGRAM(S): 30 TABLET ORAL at 09:34

## 2025-02-16 RX ADMIN — Medication 15 MILLIGRAM(S): at 18:15

## 2025-02-16 NOTE — PROGRESS NOTE ADULT - ASSESSMENT
_________________________________________________________________________________________  ========>>  M E D I C A L   A T T E N D I N G    F O L L O W  U P  N O T E  <<=========  -----------------------------------------------------------------------------------------------------    - Patient evaluated by me, chart reviewed.   no major events reported / noted otherwise     patient in good spirits and today seen walking with assistance in the room !!    ==================>> REVIEW OF SYSTEM <<=================    GEN: no fever, no chills,   RESP: no SOB, no cough, no sputum  CVS: no chest pain, no palpitations  GI: no abdominal pain, no nausea  : no dysuria, no frequency  Neuro: no headache, no dizziness    ==================>> PHYSICAL EXAM <<=================    GEN: A&O X 3 , NAD , comfortable, as above .. in bed .. encouraged increased activity   HEENT: NCAT, PERRL, MMM, hearing intact  CVS: S1S2 , regular , No M/R/G appreciated  PULM: lung sounds improved   ABD.: soft. non tender, non distended,  Extrem: intact pulses , leg edema decreased : stasis changes , wounds dressed ( examined with RN changing dressing, wounds have improved..           ( Note written / Date of service 02-16-25 ( This is certified to be the same as "ENTERED" date above ( for billing purposes)))    ==================>> MEDICATIONS <<====================    albuterol/ipratropium for Nebulization 3 milliLiter(s) Nebulizer every 6 hours  AQUAPHOR (petrolatum Ointment) 1 Application(s) Topical daily  atorvastatin 40 milliGRAM(s) Oral at bedtime  cefepime   IVPB 1000 milliGRAM(s) IV Intermittent every 12 hours  chlorhexidine 2% Cloths 1 Application(s) Topical daily  cloNIDine 0.1 milliGRAM(s) Oral every 12 hours  collagenase Ointment 1 Application(s) Topical daily  fluticasone propionate/ salmeterol 250-50 MICROgram(s) Diskus 1 Dose(s) Inhalation two times a day  gabapentin 400 milliGRAM(s) Oral two times a day  lactulose Syrup 10 Gram(s) Oral daily  morphine ER Tablet 15 milliGRAM(s) Oral every 12 hours  rivaroxaban 2.5 milliGRAM(s) Oral two times a day  senna 2 Tablet(s) Oral at bedtime  sodium chloride 3%  Inhalation 4 milliLiter(s) Inhalation every 6 hours  sodium zirconium cyclosilicate 5 Gram(s) Oral once  thiamine 100 milliGRAM(s) Oral daily  tiotropium 2.5 MICROgram(s) Inhaler 2 Puff(s) Inhalation daily  trimethoprim  160 mG/sulfamethoxazole 800 mG 1 Tablet(s) Oral two times a day    MEDICATIONS  (PRN):  acetaminophen     Tablet .. 650 milliGRAM(s) Oral every 6 hours PRN Temp greater or equal to 38C (100.4F), Mild Pain (1 - 3)  melatonin 3 milliGRAM(s) Oral at bedtime PRN Insomnia  naloxone Injectable 0.2 milliGRAM(s) IV Push every 3 minutes PRN respiratory depression/ suspected opioid OD  nicotine  Polacrilex Gum 4 milliGRAM(s) Oral four times a day PRN Smoking Cessation  ondansetron Injectable 4 milliGRAM(s) IV Push every 8 hours PRN Nausea and/or Vomiting  oxyCODONE    IR 5 milliGRAM(s) Oral every 4 hours PRN Severe Pain (7 - 10)    ___________  Active diet:  Diet, DASH/TLC:   Sodium & Cholesterol Restricted  1500mL Fluid Restriction ([id number])  No Concentrated Potassium  ___________________    ==================>> VITAL SIGNS <<==================     Vital Signs Last 24 HrsT(C): 36.7 (02-16-25 @ 12:23)  T(F): 98.1 (02-16-25 @ 12:23), Max: 98.5 (02-16-25 @ 04:05)  HR: 76 (02-16-25 @ 12:23) (71 - 76)  BP: 119/69 (02-16-25 @ 12:23)  RR: 18 (02-16-25 @ 12:23) (18 - 18)  SpO2: 99% (02-16-25 @ 12:23) (93% - 100%)       ==================>> LAB AND IMAGING <<==================                        9.1    4.95  )-----------( 286      ( 16 Feb 2025 07:02 )             30.1        02-16    137  |  104  |  35[H]  ----------------------------<  78  5.1   |  21[L]  |  1.06    Ca    9.1      16 Feb 2025 07:02  Phos  3.8     02-16  Mg     2.2     02-16      < from: TTE W or WO Ultrasound Enhancing Agent (01.28.25 @ 13:55) >  CONCLUSIONS:    1. Left ventricular cavity is small. Left ventricular wall thickness is normal. Left ventricular systolic function is normal with an ejection fraction of 73 % by Rios's method of disks.   2. Mildly enlarged right ventricular cavity size, with normal wall thickness, and normal right ventricular systolic function.   3. Moderate to severe aortic stenosis.   4. Moderate tricuspid regurgitation.   5. Estimated pulmonary artery systolic pressure is 75 mmHg, consistent with severe pulmonary hypertension.   6. No pericardial effusion seen.   7. Compared to the transthoracic echocardiogram performed on 4/12/2023, there have been no significant interval changes.  < end of copied text >    < from: CT Angio Chest PE Protocol w/ IV Cont (01.29.25 @ 08:47) >  IMPRESSION:  No pulmonary embolism.  Right upper lobe groundglass opacities and right lower lobe nodular   opacities are likely infectious/inflammatory.  Small bilateral pleural effusions.  < end of copied text >      < from: Xray Knee 3 Views, Right (01.27.25 @ 23:18) >  IMPRESSION:  1.  Right longstem femoral revision hardware with lucency surrounding the   hardware and marked subsidence in keeping with loosening.  2.  Lucency at the lateral cortex of the proximal femur may reflect   postoperative change versus osteolysis.  3.  Pseudoarticulation between the proximal femur and right iliac bone  < end of copied text >    < from: TTE W or WO Ultrasound Enhancing Agent (06.17.24 @ 17:12) >  CONCLUSIONS:    1. Left ventricular cavity is normal in size. Left ventricular wall thickness is normal. Left ventricular systolic function is normal with an ejection fraction of 64 % by Rios's method of disks. There are no regional wall motion abnormalities seen.   2. There is mild (grade 1) left ventricular diastolic dysfunction.   3. Normal right ventricular cavity size and normal systolic function.   4. Structurally normal mitral valve with normal leaflet excursion. There is calcification of the mitral valve annulus. There is mild mitral regurgitation.   5. The aortic valve anatomy cannot be determined with reduced systolic excursion. There is calcification of the aortic valve leaflets. There is severe aortic stenosis. The peak transaortic velocity is 3.94 m/s, peak transaortic gradient is 62.1 mmHg and mean transaortic gradient is 32.0 mmHg with an LVOT/aortic valve VTI ratio of 0.22. The aorticvalve area is estimated at 0.51 cm² by the continuity equation. There is mild to moderate aortic regurgitation.   6. The left atrium is mildly dilated with an indexed volume of 41 ml/m².   7. No prior echocardiogram is available for comparison.  < end of copied text >    ___________________________________________________________________________________  ===============>>  A S S E S S M E N T   A N D   P L A N <<===============  ------------------------------------------------------------------------------------------    79F w/ hx of ETOH in past, PAD w/ b/l carotid artery stenosis, HFpEF EF 64% w/ severe AS, COPD, HTN, CAD, RLE DVT 8/2023 w/ hx of R hip infection p/w worsening R hip pain     Problem/Plan - 1:  ·  Problem: right hip pain.   ·  Plan: Patient endorsing severe R hip pain w/o inciting fall or traumatic event. 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 06/2024 admission with possible joint infection. No clear signs of infection currently and exam is equivocal. -Pain control as ordered [noting there is some substance use history]     >> pain management follow up and further management +     Bowel regimen as needed   -cultures as above   - ID appreciated :     >>>  Levofloxacin 750 mg po q48hrs ( msec on 1/27) and Bactrim 1 DS po q8hrs to complete 6 weeks course (till 3/13)       -would do Bactrim 1 DS po daily for chronic suppression of R hip PJI instead of doxycycline there after   >> outpatient ID and ortho follow up      Problem/Plan - 2:  ·  Problem: acute exacerbation of Chronic heart failure with preserved ejection fraction (HFpEF), edema, elevated BNP     in patient with severe Aortic stenosis as above   repeat TTE as above with AS, MR, PAH  diuretics per cardio ( patient declined some doses before)   -Daily weight and I/O  cardio following   Xarelto 2.5 mg BID per cardio    CTA and Duplex negative for VTE     monitor hyponatremia free fluid restriction / increase diet ..     Problem/Plan - 3:  ·  Problem: COPD   ? has 02 PRN at home. Patient smoking tobacco >> quitting   -Duonebs Q6hrs  -Cont. Symbicort BID  -Cont. Spiriva  - pulm appreciated     ## pneumonia on CT as above >> post antibiotics >> monitor     lung sounds and breathing better      Problem/Plan - 4:  ·  Problem: CAD (coronary artery disease).   ·  Plan: Patient states she does not take aspirin or atorvastatin at home.  cardio following, appreciated     Problem/Plan - 5:  ·  Problem: history of Severe aortic stenosis.   repeat echo as noted : unchanged   cardio appreciated     Problem/Plan - 6:  ·  Problem: Essential hypertension.   ·  Plan: -Trend BP  -Cont. Clonidine BID  -Patient does not recall taking Nifedipine, can resume prior dose if BPs uncontrolled.     Problem/Plan - 7:  ·  Problem: Prophylactic measure.   ·  Plan: DVT PPx  xarelto     DISCHARGE PLANING / placement in process..  appreciated     ___________________________  H. GONZALO Perez.  Pager: [phone]       _________________________________________________________________________________________  ========>>  M E D I C A L   A T T E N D I N G    F O L L O W  U P  N O T E  <<=========  -----------------------------------------------------------------------------------------------------    - Patient evaluated by me, chart reviewed.   no major events reported / noted otherwise     patient in good spirits and today seen walking with assistance in the room !!    ==================>> REVIEW OF SYSTEM <<=================    GEN: no fever, no chills,   RESP: no SOB, no cough, no sputum  CVS: no chest pain, no palpitations  GI: no abdominal pain, no nausea  : no dysuria, no frequency  Neuro: no headache, no dizziness    ==================>> PHYSICAL EXAM <<=================    GEN: A&O X 3 , NAD , comfortable, as above .. in bed .. encouraged increased activity   HEENT: NCAT, PERRL, MMM, hearing intact  CVS: S1S2 , regular , No M/R/G appreciated  PULM: lung sounds improved   ABD.: soft. non tender, non distended,  Extrem: intact pulses , leg edema decreased : stasis changes , wounds dressed ( examined with RN changing dressing, wounds have improved..           ( Note written / Date of service 02-16-25 ( This is certified to be the same as "ENTERED" date above ( for billing purposes)))    ==================>> MEDICATIONS <<====================    albuterol/ipratropium for Nebulization 3 milliLiter(s) Nebulizer every 6 hours  AQUAPHOR (petrolatum Ointment) 1 Application(s) Topical daily  atorvastatin 40 milliGRAM(s) Oral at bedtime  cefepime   IVPB 1000 milliGRAM(s) IV Intermittent every 12 hours  chlorhexidine 2% Cloths 1 Application(s) Topical daily  cloNIDine 0.1 milliGRAM(s) Oral every 12 hours  collagenase Ointment 1 Application(s) Topical daily  fluticasone propionate/ salmeterol 250-50 MICROgram(s) Diskus 1 Dose(s) Inhalation two times a day  gabapentin 400 milliGRAM(s) Oral two times a day  lactulose Syrup 10 Gram(s) Oral daily  morphine ER Tablet 15 milliGRAM(s) Oral every 12 hours  rivaroxaban 2.5 milliGRAM(s) Oral two times a day  senna 2 Tablet(s) Oral at bedtime  sodium chloride 3%  Inhalation 4 milliLiter(s) Inhalation every 6 hours  sodium zirconium cyclosilicate 5 Gram(s) Oral once  thiamine 100 milliGRAM(s) Oral daily  tiotropium 2.5 MICROgram(s) Inhaler 2 Puff(s) Inhalation daily  trimethoprim  160 mG/sulfamethoxazole 800 mG 1 Tablet(s) Oral two times a day    MEDICATIONS  (PRN):  acetaminophen     Tablet .. 650 milliGRAM(s) Oral every 6 hours PRN Temp greater or equal to 38C (100.4F), Mild Pain (1 - 3)  melatonin 3 milliGRAM(s) Oral at bedtime PRN Insomnia  naloxone Injectable 0.2 milliGRAM(s) IV Push every 3 minutes PRN respiratory depression/ suspected opioid OD  nicotine  Polacrilex Gum 4 milliGRAM(s) Oral four times a day PRN Smoking Cessation  ondansetron Injectable 4 milliGRAM(s) IV Push every 8 hours PRN Nausea and/or Vomiting  oxyCODONE    IR 5 milliGRAM(s) Oral every 4 hours PRN Severe Pain (7 - 10)    ___________  Active diet:  Diet, DASH/TLC:   Sodium & Cholesterol Restricted  1500mL Fluid Restriction ([id number])  No Concentrated Potassium  ___________________    ==================>> VITAL SIGNS <<==================     Vital Signs Last 24 HrsT(C): 36.7 (02-16-25 @ 12:23)  T(F): 98.1 (02-16-25 @ 12:23), Max: 98.5 (02-16-25 @ 04:05)  HR: 76 (02-16-25 @ 12:23) (71 - 76)  BP: 119/69 (02-16-25 @ 12:23)  RR: 18 (02-16-25 @ 12:23) (18 - 18)  SpO2: 99% (02-16-25 @ 12:23) (93% - 100%)       ==================>> LAB AND IMAGING <<==================                        9.1    4.95  )-----------( 286      ( 16 Feb 2025 07:02 )             30.1        02-16    137  |  104  |  35[H]  ----------------------------<  78  5.1   |  21[L]  |  1.06    Ca    9.1      16 Feb 2025 07:02  Phos  3.8     02-16  Mg     2.2     02-16      < from: TTE W or WO Ultrasound Enhancing Agent (01.28.25 @ 13:55) >  CONCLUSIONS:    1. Left ventricular cavity is small. Left ventricular wall thickness is normal. Left ventricular systolic function is normal with an ejection fraction of 73 % by Rios's method of disks.   2. Mildly enlarged right ventricular cavity size, with normal wall thickness, and normal right ventricular systolic function.   3. Moderate to severe aortic stenosis.   4. Moderate tricuspid regurgitation.   5. Estimated pulmonary artery systolic pressure is 75 mmHg, consistent with severe pulmonary hypertension.   6. No pericardial effusion seen.   7. Compared to the transthoracic echocardiogram performed on 4/12/2023, there have been no significant interval changes.  < end of copied text >    < from: CT Angio Chest PE Protocol w/ IV Cont (01.29.25 @ 08:47) >  IMPRESSION:  No pulmonary embolism.  Right upper lobe groundglass opacities and right lower lobe nodular   opacities are likely infectious/inflammatory.  Small bilateral pleural effusions.  < end of copied text >      < from: Xray Knee 3 Views, Right (01.27.25 @ 23:18) >  IMPRESSION:  1.  Right longstem femoral revision hardware with lucency surrounding the   hardware and marked subsidence in keeping with loosening.  2.  Lucency at the lateral cortex of the proximal femur may reflect   postoperative change versus osteolysis.  3.  Pseudoarticulation between the proximal femur and right iliac bone  < end of copied text >    < from: TTE W or WO Ultrasound Enhancing Agent (06.17.24 @ 17:12) >  CONCLUSIONS:    1. Left ventricular cavity is normal in size. Left ventricular wall thickness is normal. Left ventricular systolic function is normal with an ejection fraction of 64 % by Rios's method of disks. There are no regional wall motion abnormalities seen.   2. There is mild (grade 1) left ventricular diastolic dysfunction.   3. Normal right ventricular cavity size and normal systolic function.   4. Structurally normal mitral valve with normal leaflet excursion. There is calcification of the mitral valve annulus. There is mild mitral regurgitation.   5. The aortic valve anatomy cannot be determined with reduced systolic excursion. There is calcification of the aortic valve leaflets. There is severe aortic stenosis. The peak transaortic velocity is 3.94 m/s, peak transaortic gradient is 62.1 mmHg and mean transaortic gradient is 32.0 mmHg with an LVOT/aortic valve VTI ratio of 0.22. The aorticvalve area is estimated at 0.51 cm² by the continuity equation. There is mild to moderate aortic regurgitation.   6. The left atrium is mildly dilated with an indexed volume of 41 ml/m².   7. No prior echocardiogram is available for comparison.  < end of copied text >    ___________________________________________________________________________________  ===============>>  A S S E S S M E N T   A N D   P L A N <<===============  ------------------------------------------------------------------------------------------    79F w/ hx of ETOH in past, PAD w/ b/l carotid artery stenosis, HFpEF EF 64% w/ severe AS, COPD, HTN, CAD, RLE DVT 8/2023 w/ hx of R hip infection p/w worsening R hip pain     Problem/Plan - 1:  ·  Problem: right hip pain.   ·  Plan: Patient endorsing severe R hip pain w/o inciting fall or traumatic event. 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 06/2024 admission with possible joint infection. No clear signs of infection currently and exam is equivocal. -Pain control as ordered [noting there is some substance use history]     >> pain management follow up and further management +     Bowel regimen as needed   -cultures as above   - ID appreciated :     >>>  Levofloxacin 750 mg po q48hrs ( msec on 1/27) and Bactrim 1 DS po q8hrs to complete 6 weeks course (till 3/13)       -would do Bactrim 1 DS po daily for chronic suppression of R hip PJI instead of doxycycline there after   >> outpatient ID and ortho follow up      Problem/Plan - 2:  ·  Problem: acute exacerbation of Chronic heart failure with preserved ejection fraction (HFpEF), edema, elevated BNP     in patient with severe Aortic stenosis as above   repeat TTE as above with AS, MR, PAH  diuretics per cardio ( patient declined some doses before)   -Daily weight and I/O  cardio following   Xarelto 2.5 mg BID per cardio    CTA and Duplex negative for VTE     monitor hyponatremia free fluid restriction / increase diet ..     Problem/Plan - 3:  ·  Problem: COPD   ? has 02 PRN at home. Patient smoking tobacco >> quitting   -Duonebs Q6hrs  -Cont. Symbicort BID  -Cont. Spiriva  - pulm appreciated     ## pneumonia on CT as above >> post antibiotics >> monitor     lung sounds and breathing better      Problem/Plan - 4:  ·  Problem: CAD (coronary artery disease).   ·  Plan: Patient states she does not take aspirin or atorvastatin at home.  cardio following, appreciated     Problem/Plan - 5:  ·  Problem: history of Severe aortic stenosis.   repeat echo as noted : unchanged   cardio appreciated     Problem/Plan - 6:  ·  Problem: Essential hypertension.   ·  Plan: -Trend BP  -Cont. Clonidine BID  -Patient does not recall taking Nifedipine, can resume prior dose if BPs uncontrolled.     Problem/Plan - 7:  ·  Problem: Prophylactic measure.   ·  Plan: DVT PPx  xarelto     elevated K / hyperkalemia >> now improved     continue daily lactulose..  / lokelma as needed       DISCHARGE PLANING / placement in process..  appreciated     ___________________________  H. GONZALO Perez.  Pager: [phone]

## 2025-02-17 ENCOUNTER — TRANSCRIPTION ENCOUNTER (OUTPATIENT)
Age: 80
End: 2025-02-17

## 2025-02-17 VITALS
DIASTOLIC BLOOD PRESSURE: 66 MMHG | SYSTOLIC BLOOD PRESSURE: 121 MMHG | OXYGEN SATURATION: 93 % | TEMPERATURE: 98 F | HEART RATE: 76 BPM | RESPIRATION RATE: 18 BRPM

## 2025-02-17 LAB
ANION GAP SERPL CALC-SCNC: 13 MMOL/L — SIGNIFICANT CHANGE UP (ref 5–17)
BUN SERPL-MCNC: 38 MG/DL — HIGH (ref 7–23)
CALCIUM SERPL-MCNC: 9 MG/DL — SIGNIFICANT CHANGE UP (ref 8.4–10.5)
CHLORIDE SERPL-SCNC: 100 MMOL/L — SIGNIFICANT CHANGE UP (ref 96–108)
CO2 SERPL-SCNC: 21 MMOL/L — LOW (ref 22–31)
CREAT SERPL-MCNC: 1.21 MG/DL — SIGNIFICANT CHANGE UP (ref 0.5–1.3)
EGFR: 46 ML/MIN/1.73M2 — LOW
GLUCOSE SERPL-MCNC: 121 MG/DL — HIGH (ref 70–99)
HCT VFR BLD CALC: 34.7 % — SIGNIFICANT CHANGE UP (ref 34.5–45)
HGB BLD-MCNC: 10.3 G/DL — LOW (ref 11.5–15.5)
MAGNESIUM SERPL-MCNC: 2.1 MG/DL — SIGNIFICANT CHANGE UP (ref 1.6–2.6)
MCHC RBC-ENTMCNC: 28.8 PG — SIGNIFICANT CHANGE UP (ref 27–34)
MCHC RBC-ENTMCNC: 29.7 G/DL — LOW (ref 32–36)
MCV RBC AUTO: 96.9 FL — SIGNIFICANT CHANGE UP (ref 80–100)
NRBC BLD AUTO-RTO: 0 /100 WBCS — SIGNIFICANT CHANGE UP (ref 0–0)
PHOSPHATE SERPL-MCNC: 4.2 MG/DL — SIGNIFICANT CHANGE UP (ref 2.5–4.5)
PLATELET # BLD AUTO: 307 K/UL — SIGNIFICANT CHANGE UP (ref 150–400)
POTASSIUM SERPL-MCNC: 5.2 MMOL/L — SIGNIFICANT CHANGE UP (ref 3.5–5.3)
POTASSIUM SERPL-SCNC: 5.2 MMOL/L — SIGNIFICANT CHANGE UP (ref 3.5–5.3)
RBC # BLD: 3.58 M/UL — LOW (ref 3.8–5.2)
RBC # FLD: 15.8 % — HIGH (ref 10.3–14.5)
SODIUM SERPL-SCNC: 134 MMOL/L — LOW (ref 135–145)
WBC # BLD: 5.19 K/UL — SIGNIFICANT CHANGE UP (ref 3.8–10.5)
WBC # FLD AUTO: 5.19 K/UL — SIGNIFICANT CHANGE UP (ref 3.8–10.5)

## 2025-02-17 PROCEDURE — 71275 CT ANGIOGRAPHY CHEST: CPT | Mod: MC

## 2025-02-17 PROCEDURE — 83880 ASSAY OF NATRIURETIC PEPTIDE: CPT

## 2025-02-17 PROCEDURE — 99285 EMERGENCY DEPT VISIT HI MDM: CPT

## 2025-02-17 PROCEDURE — 97162 PT EVAL MOD COMPLEX 30 MIN: CPT

## 2025-02-17 PROCEDURE — 36415 COLL VENOUS BLD VENIPUNCTURE: CPT

## 2025-02-17 PROCEDURE — 87186 SC STD MICRODIL/AGAR DIL: CPT

## 2025-02-17 PROCEDURE — 93970 EXTREMITY STUDY: CPT

## 2025-02-17 PROCEDURE — 82330 ASSAY OF CALCIUM: CPT

## 2025-02-17 PROCEDURE — 73562 X-RAY EXAM OF KNEE 3: CPT

## 2025-02-17 PROCEDURE — 85025 COMPLETE CBC W/AUTO DIFF WBC: CPT

## 2025-02-17 PROCEDURE — 87040 BLOOD CULTURE FOR BACTERIA: CPT

## 2025-02-17 PROCEDURE — 86738 MYCOPLASMA ANTIBODY: CPT

## 2025-02-17 PROCEDURE — 73502 X-RAY EXAM HIP UNI 2-3 VIEWS: CPT

## 2025-02-17 PROCEDURE — 84100 ASSAY OF PHOSPHORUS: CPT

## 2025-02-17 PROCEDURE — 82962 GLUCOSE BLOOD TEST: CPT

## 2025-02-17 PROCEDURE — 87077 CULTURE AEROBIC IDENTIFY: CPT

## 2025-02-17 PROCEDURE — 85379 FIBRIN DEGRADATION QUANT: CPT

## 2025-02-17 PROCEDURE — 83605 ASSAY OF LACTIC ACID: CPT

## 2025-02-17 PROCEDURE — 96375 TX/PRO/DX INJ NEW DRUG ADDON: CPT

## 2025-02-17 PROCEDURE — 85018 HEMOGLOBIN: CPT

## 2025-02-17 PROCEDURE — 87449 NOS EACH ORGANISM AG IA: CPT

## 2025-02-17 PROCEDURE — 73552 X-RAY EXAM OF FEMUR 2/>: CPT

## 2025-02-17 PROCEDURE — 86140 C-REACTIVE PROTEIN: CPT

## 2025-02-17 PROCEDURE — 85652 RBC SED RATE AUTOMATED: CPT

## 2025-02-17 PROCEDURE — 80048 BASIC METABOLIC PNL TOTAL CA: CPT

## 2025-02-17 PROCEDURE — 80053 COMPREHEN METABOLIC PANEL: CPT

## 2025-02-17 PROCEDURE — 82803 BLOOD GASES ANY COMBINATION: CPT

## 2025-02-17 PROCEDURE — 85027 COMPLETE CBC AUTOMATED: CPT

## 2025-02-17 PROCEDURE — 96374 THER/PROPH/DIAG INJ IV PUSH: CPT

## 2025-02-17 PROCEDURE — 82435 ASSAY OF BLOOD CHLORIDE: CPT

## 2025-02-17 PROCEDURE — 84132 ASSAY OF SERUM POTASSIUM: CPT

## 2025-02-17 PROCEDURE — 83735 ASSAY OF MAGNESIUM: CPT

## 2025-02-17 PROCEDURE — 85730 THROMBOPLASTIN TIME PARTIAL: CPT

## 2025-02-17 PROCEDURE — 73610 X-RAY EXAM OF ANKLE: CPT

## 2025-02-17 PROCEDURE — 97116 GAIT TRAINING THERAPY: CPT

## 2025-02-17 PROCEDURE — 80299 QUANTITATIVE ASSAY DRUG: CPT

## 2025-02-17 PROCEDURE — 84295 ASSAY OF SERUM SODIUM: CPT

## 2025-02-17 PROCEDURE — 85610 PROTHROMBIN TIME: CPT

## 2025-02-17 PROCEDURE — 87070 CULTURE OTHR SPECIMN AEROBIC: CPT

## 2025-02-17 PROCEDURE — 97530 THERAPEUTIC ACTIVITIES: CPT

## 2025-02-17 PROCEDURE — 87184 SC STD DISK METHOD PER PLATE: CPT

## 2025-02-17 PROCEDURE — 97110 THERAPEUTIC EXERCISES: CPT

## 2025-02-17 PROCEDURE — 85014 HEMATOCRIT: CPT

## 2025-02-17 PROCEDURE — 36600 WITHDRAWAL OF ARTERIAL BLOOD: CPT

## 2025-02-17 PROCEDURE — 73723 MRI JOINT LWR EXTR W/O&W/DYE: CPT | Mod: MC

## 2025-02-17 PROCEDURE — 71045 X-RAY EXAM CHEST 1 VIEW: CPT

## 2025-02-17 PROCEDURE — 80202 ASSAY OF VANCOMYCIN: CPT

## 2025-02-17 PROCEDURE — 93306 TTE W/DOPPLER COMPLETE: CPT

## 2025-02-17 PROCEDURE — 82947 ASSAY GLUCOSE BLOOD QUANT: CPT

## 2025-02-17 PROCEDURE — 94640 AIRWAY INHALATION TREATMENT: CPT

## 2025-02-17 PROCEDURE — 87205 SMEAR GRAM STAIN: CPT

## 2025-02-17 RX ORDER — DOXYCYCLINE HYCLATE 100 MG
1 TABLET ORAL
Refills: 0 | DISCHARGE

## 2025-02-17 RX ORDER — LEVOFLOXACIN 25 MG/ML
1 SOLUTION ORAL
Qty: 0 | Refills: 0 | DISCHARGE

## 2025-02-17 RX ADMIN — WHITE PETROLATUM 1 APPLICATION(S): 1 OINTMENT TOPICAL at 12:19

## 2025-02-17 RX ADMIN — GABAPENTIN 400 MILLIGRAM(S): 400 CAPSULE ORAL at 06:17

## 2025-02-17 RX ADMIN — Medication 1 TABLET(S): at 06:17

## 2025-02-17 RX ADMIN — IPRATROPIUM BROMIDE AND ALBUTEROL SULFATE 3 MILLILITER(S): .5; 2.5 SOLUTION RESPIRATORY (INHALATION) at 00:27

## 2025-02-17 RX ADMIN — IPRATROPIUM BROMIDE AND ALBUTEROL SULFATE 3 MILLILITER(S): .5; 2.5 SOLUTION RESPIRATORY (INHALATION) at 06:16

## 2025-02-17 RX ADMIN — Medication 1 DOSE(S): at 06:17

## 2025-02-17 RX ADMIN — Medication 1 APPLICATION(S): at 12:19

## 2025-02-17 RX ADMIN — TIOTROPIUM BROMIDE INHALATION SPRAY 2 PUFF(S): 3.12 SPRAY, METERED RESPIRATORY (INHALATION) at 12:19

## 2025-02-17 RX ADMIN — OXYCODONE HYDROCHLORIDE 5 MILLIGRAM(S): 30 TABLET ORAL at 12:17

## 2025-02-17 RX ADMIN — Medication 15 MILLIGRAM(S): at 06:50

## 2025-02-17 RX ADMIN — Medication 4 MILLILITER(S): at 00:27

## 2025-02-17 RX ADMIN — Medication 0.1 MILLIGRAM(S): at 06:17

## 2025-02-17 RX ADMIN — Medication 1 APPLICATION(S): at 12:39

## 2025-02-17 RX ADMIN — CEFEPIME 100 MILLIGRAM(S): 2 INJECTION, POWDER, FOR SOLUTION INTRAVENOUS at 06:16

## 2025-02-17 RX ADMIN — Medication 4 MILLILITER(S): at 12:18

## 2025-02-17 RX ADMIN — IPRATROPIUM BROMIDE AND ALBUTEROL SULFATE 3 MILLILITER(S): .5; 2.5 SOLUTION RESPIRATORY (INHALATION) at 12:18

## 2025-02-17 RX ADMIN — OXYCODONE HYDROCHLORIDE 5 MILLIGRAM(S): 30 TABLET ORAL at 13:00

## 2025-02-17 RX ADMIN — RIVAROXABAN 2.5 MILLIGRAM(S): 10 TABLET, FILM COATED ORAL at 06:17

## 2025-02-17 RX ADMIN — Medication 15 MILLIGRAM(S): at 06:17

## 2025-02-17 RX ADMIN — Medication 4 MILLILITER(S): at 06:17

## 2025-02-17 NOTE — PROGRESS NOTE ADULT - ASSESSMENT
M E D I C A L   A T T E N D I N G    F O L L O W    U P   N O T E  (02-17-25 )                                     ------------------------------------------------------------------------------------------------    patient evaluated by me, case discussed with team, chart, medications, and physical exam reviewed, labs / tests  and vitals reviewed by me, as bellow.   Patient is stable for discharge today. patient going to rehabilitation   Patient to follow up with  ID, Ortho, Cardio, PMD..   See discharge document for full note (discussed with ACP).  [Greater than 35 min spent for these services. ]          ( Note written / Date of service 02-17-25 ( This is certified to be the same as "ENTERED" date above ( for billing purposes)))    ==================>> MEDICATIONS <<====================    albuterol/ipratropium for Nebulization 3 milliLiter(s) Nebulizer every 6 hours  AQUAPHOR (petrolatum Ointment) 1 Application(s) Topical daily  atorvastatin 40 milliGRAM(s) Oral at bedtime  cefepime   IVPB 1000 milliGRAM(s) IV Intermittent every 12 hours  chlorhexidine 2% Cloths 1 Application(s) Topical daily  cloNIDine 0.1 milliGRAM(s) Oral every 12 hours  collagenase Ointment 1 Application(s) Topical daily  fluticasone propionate/ salmeterol 250-50 MICROgram(s) Diskus 1 Dose(s) Inhalation two times a day  gabapentin 400 milliGRAM(s) Oral two times a day  lactulose Syrup 10 Gram(s) Oral daily  morphine ER Tablet 15 milliGRAM(s) Oral every 12 hours  rivaroxaban 2.5 milliGRAM(s) Oral two times a day  senna 2 Tablet(s) Oral at bedtime  sodium chloride 3%  Inhalation 4 milliLiter(s) Inhalation every 6 hours  sodium zirconium cyclosilicate 5 Gram(s) Oral once  thiamine 100 milliGRAM(s) Oral daily  tiotropium 2.5 MICROgram(s) Inhaler 2 Puff(s) Inhalation daily  trimethoprim  160 mG/sulfamethoxazole 800 mG 1 Tablet(s) Oral two times a day    MEDICATIONS  (PRN):  acetaminophen     Tablet .. 650 milliGRAM(s) Oral every 6 hours PRN Temp greater or equal to 38C (100.4F), Mild Pain (1 - 3)  melatonin 3 milliGRAM(s) Oral at bedtime PRN Insomnia  naloxone Injectable 0.2 milliGRAM(s) IV Push every 3 minutes PRN respiratory depression/ suspected opioid OD  nicotine  Polacrilex Gum 4 milliGRAM(s) Oral four times a day PRN Smoking Cessation  ondansetron Injectable 4 milliGRAM(s) IV Push every 8 hours PRN Nausea and/or Vomiting  oxyCODONE    IR 5 milliGRAM(s) Oral every 4 hours PRN Severe Pain (7 - 10)    ___________  Active diet:  Diet, DASH/TLC:   Sodium & Cholesterol Restricted  1500mL Fluid Restriction ([id number])  No Concentrated Potassium  ___________________    ==================>> VITAL SIGNS <<==================    T(C): 36.6 (02-17-25 @ 12:10), Max: 37.1 (02-16-25 @ 19:46)  HR: 76 (02-17-25 @ 12:10) (76 - 77)  BP: 121/66 (02-17-25 @ 12:10) (121/66 - 144/66)  RR: 18 (02-17-25 @ 12:10) (18 - 18)  SpO2: 93% (02-17-25 @ 12:10) (91% - 93%)     I&O's Summary    16 Feb 2025 07:01  -  17 Feb 2025 07:00  --------------------------------------------------------  IN: 510 mL / OUT: 1700 mL / NET: -1190 mL       ==================>> LAB AND IMAGING <<==================                        10.3   5.19  )-----------( 307      ( 17 Feb 2025 07:12 )             34.7        02-17    134[L]  |  100  |  38[H]  ----------------------------<  121[H]  5.2   |  21[L]  |  1.21    Ca    9.0      17 Feb 2025 07:12  Phos  4.2     02-17  Mg     2.1     02-17        WBC count:   5.19 <<== ,  4.95 <<==   Hemoglobin:   10.3 <<==,  9.1 <<==  platelets:  307 <==, 286 <==    Creatinine:  1.21  <<==, 1.06  <<==, 1.17  <<==, 1.15  <<==, 1.12  <<==, 1.39  <<==  Sodium:   134  <==, 137  <==, 134  <==, 135  <==, 135  <==, 132  <==          ( Note written / Date of service 02-17-25 ( This is certified to be the same as "ENTERED" date above ( for billing Purposes )))

## 2025-02-17 NOTE — DISCHARGE NOTE NURSING/CASE MANAGEMENT/SOCIAL WORK - PATIENT PORTAL LINK FT
You can access the FollowMyHealth Patient Portal offered by [facility] by registering at the following website: [email or web address]. By joining [facility]’s FollowMyHealth portal, you will also be able to view your health information using other applications (apps) compatible with our system.

## 2025-02-17 NOTE — DISCHARGE NOTE NURSING/CASE MANAGEMENT/SOCIAL WORK - FINANCIAL ASSISTANCE
[facility] provides services at a reduced cost to those who are determined to be eligible through [facility]’s financial assistance program. Information regarding [facility]’s financial assistance program can be found by going to [email or web address] or by calling [phone].

## 2025-02-17 NOTE — DISCHARGE NOTE NURSING/CASE MANAGEMENT/SOCIAL WORK - NSDCFUADDAPPT_GEN_ALL_CORE_FT
APPTS ARE READY TO BE MADE: [X] YES    Best Family or Patient Contact (if needed):    Additional Information about above appointments (if needed):    1: PCP  2: Cardiology  3: pulmonary/HOME pulm  4: ID  5: psychiatry   6: podiatry   7: orthopedics     Other comments or requests:

## 2025-02-17 NOTE — PROGRESS NOTE ADULT - PROVIDER SPECIALTY LIST ADULT
Cardiology
Infectious Disease
Internal Medicine
Podiatry
Podiatry
Cardiology
Infectious Disease
Internal Medicine
Podiatry
Pulmonology
Pulmonology
Cardiology
Internal Medicine
Podiatry
Internal Medicine

## 2025-02-17 NOTE — CHART NOTE - NSCHARTNOTESELECT_GEN_ALL_CORE
Event Note
Discharge
Event Note
ID chart note
ISTOP/Event Note
Orthopedic Surgery/Event Note
POCUS/Event Note
chronic pain service/Event Note
hyperkalemia/Event Note

## 2025-02-17 NOTE — PROGRESS NOTE ADULT - SUBJECTIVE AND OBJECTIVE BOX
Cardiovascular Disease Progress Note    Overnight events: No acute events overnight.  no new cardiac sx  Otherwise review of systems negative    Objective Findings:  T(C): 36.8 (02-17-25 @ 06:00), Max: 37.1 (02-16-25 @ 19:46)  HR: 76 (02-17-25 @ 06:00) (76 - 77)  BP: 144/66 (02-17-25 @ 06:00) (119/69 - 144/66)  RR: 18 (02-17-25 @ 06:00) (18 - 18)  SpO2: 91% (02-16-25 @ 19:46) (91% - 99%)  Wt(kg): --  Daily     Daily       Physical Exam:  Gen: NAD  HEENT: EOMI  CV: RRR, normal S1 + S2, no m/r/g  Lungs: CTAB  Abd: soft, non-tender  Ext: No edema    Telemetry:    Laboratory Data:                        9.1    4.95  )-----------( 286      ( 16 Feb 2025 07:02 )             30.1     02-16    137  |  104  |  35[H]  ----------------------------<  78  5.1   |  21[L]  |  1.06    Ca    9.1      16 Feb 2025 07:02  Phos  3.8     02-16  Mg     2.2     02-16                Inpatient Medications:  MEDICATIONS  (STANDING):  albuterol/ipratropium for Nebulization 3 milliLiter(s) Nebulizer every 6 hours  AQUAPHOR (petrolatum Ointment) 1 Application(s) Topical daily  atorvastatin 40 milliGRAM(s) Oral at bedtime  cefepime   IVPB 1000 milliGRAM(s) IV Intermittent every 12 hours  chlorhexidine 2% Cloths 1 Application(s) Topical daily  cloNIDine 0.1 milliGRAM(s) Oral every 12 hours  collagenase Ointment 1 Application(s) Topical daily  fluticasone propionate/ salmeterol 250-50 MICROgram(s) Diskus 1 Dose(s) Inhalation two times a day  gabapentin 400 milliGRAM(s) Oral two times a day  lactulose Syrup 10 Gram(s) Oral daily  morphine ER Tablet 15 milliGRAM(s) Oral every 12 hours  rivaroxaban 2.5 milliGRAM(s) Oral two times a day  senna 2 Tablet(s) Oral at bedtime  sodium chloride 3%  Inhalation 4 milliLiter(s) Inhalation every 6 hours  sodium zirconium cyclosilicate 5 Gram(s) Oral once  thiamine 100 milliGRAM(s) Oral daily  tiotropium 2.5 MICROgram(s) Inhaler 2 Puff(s) Inhalation daily  trimethoprim  160 mG/sulfamethoxazole 800 mG 1 Tablet(s) Oral two times a day      Assessment:  79F w/ hx of ETOH in past, PAD w/ b/l carotid artery stenosis, HFpEF EF 64% w/ severe AS, COPD, HTN, CAD, RLE DVT 8/2023 w/ hx of R hip infection p/w worsening R hip pain and hypoxic resp failure    Recs:  cardiac stable  no e/o acs  cw antiplatelet, statin and antianginals for cad/stent. denies chest pain. hold off on ischemic eval for now   vol status stable =cont to hold lasix for now and likely resume on qod basis once renal function normalizes  s/p echo, results noted. mod to severe AS. doubt would be a TAVR candidate in setting of chronic hip infection and risk of endocarditis  tx for copd per pulmonary  cw xarelto 2.5mg bid for PAD dosing and "ppx for hx of VTE"   pain control  long term abx per ID  f/u ortho recs  dcp/placement in process      Over 25 minutes spent on total encounter; more than 50% of the visit was spent counseling and/or coordinating care by the attending physician.      Chaka Lawrence MD   Cardiovascular Disease  [phone]

## 2025-02-17 NOTE — PROGRESS NOTE ADULT - REASON FOR ADMISSION
Worsening R hip pain

## 2025-02-17 NOTE — CHART NOTE - NSCHARTNOTEFT_GEN_A_CORE
Request from Dr. Perez to facilitate patient discharge today. Medication reconciliation reviewed, revised, and resolved with Dr. Perez , who has medically cleared patient for discharge with follow up as advised. Please refer to discharge note for detailed hospital course.    Charlotte Singer PA-C

## 2025-02-18 ENCOUNTER — NON-APPOINTMENT (OUTPATIENT)
Age: 80
End: 2025-02-18

## 2025-03-03 ENCOUNTER — APPOINTMENT (OUTPATIENT)
Dept: INFECTIOUS DISEASE | Facility: CLINIC | Age: 80
End: 2025-03-03

## 2025-03-27 ENCOUNTER — APPOINTMENT (OUTPATIENT)
Dept: INFECTIOUS DISEASE | Facility: CLINIC | Age: 80
End: 2025-03-27

## 2025-03-27 DIAGNOSIS — Z96.649 INFECTION AND INFLAMMATORY REACTION DUE TO OTHER INTERNAL JOINT PROSTHESIS, INITIAL ENCOUNTER: ICD-10-CM

## 2025-03-27 DIAGNOSIS — T84.59XA INFECTION AND INFLAMMATORY REACTION DUE TO OTHER INTERNAL JOINT PROSTHESIS, INITIAL ENCOUNTER: ICD-10-CM

## 2025-03-27 RX ORDER — SULFAMETHOXAZOLE AND TRIMETHOPRIM 800; 160 MG/1; MG/1
800-160 TABLET ORAL DAILY
Qty: 30 | Refills: 6 | Status: ACTIVE | COMMUNITY
Start: 2025-03-27 | End: 1900-01-01

## 2025-04-10 NOTE — ED ADULT TRIAGE NOTE - AS HEIGHT TYPE
Patient: Dominique Low    Procedure Summary       Date: 04/10/25 Room / Location: [facility] MAIN OR [address] [place] [address] MAIN OR    Anesthesia Start: 1603 Anesthesia Stop:     Procedure: DILATION & CURETTAGE SUCTION Diagnosis: (missed )    Surgeons: Tapan Lawler MD Anesthesiologist: Cole Lewis MD    Anesthesia Type: general ASA Status: 3            Anesthesia Type: general    Vitals Value Taken Time   /85 04/10/25 16:41   Temp 97.6f 04/10/25 16:42   Pulse 102 04/10/25 16:41   Resp 20 04/10/25 16:41   SpO2 93 % 04/10/25 16:41   Vitals shown include unfiled device data.    [facility] AN Post Evaluation:   Patient Evaluated in PACU  Patient Participation: complete - patient participated  Level of Consciousness: awake  Pain Score: 0  Pain Management: adequate  Airway Patency:patent  Dental exam unchanged from preop  Yes    Nausea/Vomiting: none  Cardiovascular Status: acceptable  Respiratory Status: acceptable  Postoperative Hydration acceptable      Vandana Morales CRNA  4/10/2025 4:42 PM  
stated

## 2025-04-24 NOTE — PRE-ANESTHESIA EVALUATION ADULT - NSANTHADDINFOFT_GEN_ALL_CORE
Risks and indications for monitored anesthesia care involving, but not limited to, nausea, aspiration or anaphylaxis requiring endotracheal intubation were discussed. Risks were acknowledged and accepted by patient, all of her questions were answered. medical clearance  check labs cbc cmp   ekg  preop instructions were given  Semaglutide Oral Suspension last dose 4/24/25 stop 7 days before surgery  Morning of surgery take Anastrozole Verzenio with a tiny sip of water  hibiclens x 3 days with written and verbal instructions given  preopinstructions were givne  patient verbalizes understanding of instructions Additional handoff

## 2025-05-09 NOTE — PHYSICAL THERAPY INITIAL EVALUATION ADULT - ADDITIONAL COMMENTS
Patient did not receive blood work due to not all orders being in.    Pt admitted from [facility] where she was participating in physical therapy including bed mobility, transfers and ambulation with assistance. Pt admitted from [facility] where she was participating in physical therapy including bed mobility, transfers and ambulation with assistance.    Pt unreliable historian, however states lives in a house with , 2-5 steps, using no AD prior to first admission. However will follow up for more information on social history.

## 2025-07-03 NOTE — H&P ADULT - NECK
Patient's son, Vlad, took two of patients rings home after she took the off this evening. Will update belongings.   supple/no tracheal deviation

## (undated) DEVICE — LAP PAD 18 X 18"

## (undated) DEVICE — DRAPE MAYO STAND 30"

## (undated) DEVICE — SOL IRR POUR NS 0.9% 500ML

## (undated) DEVICE — MARKING PEN W RULER

## (undated) DEVICE — DRAPE 1/2 SHEET 40X57"

## (undated) DEVICE — SUT BIOSYN 4-0 18" P-12

## (undated) DEVICE — SOL IRR POUR H2O 250ML

## (undated) DEVICE — SUCTION YANKAUER NO CONTROL VENT

## (undated) DEVICE — CONN DUAL HOSE

## (undated) DEVICE — BLADE SCALPEL SAFETYLOCK #11

## (undated) DEVICE — BLADE SCALPEL SAFETYLOCK #10

## (undated) DEVICE — SUT PROLENE 7-0 24" BV175-6

## (undated) DEVICE — PACK GENERAL MINOR

## (undated) DEVICE — DRAPE 3/4 SHEET W REINFORCEMENT 56X77"

## (undated) DEVICE — NDL PERC BASEPLT 18GX7CM

## (undated) DEVICE — DRAPE LIGHT HANDLE COVER (BLUE)

## (undated) DEVICE — GLV 6.5 PROTEXIS (WHITE)

## (undated) DEVICE — PREP CHLORAPREP HI-LITE ORANGE 26ML

## (undated) DEVICE — DRAPE INSTRUMENT POUCH 6.75" X 11"

## (undated) DEVICE — SPECIMEN CONTAINER 100ML

## (undated) DEVICE — GOWN XL

## (undated) DEVICE — DRSG TEGADERM 6"X8"

## (undated) DEVICE — MEDICATION LABELS W MARKER

## (undated) DEVICE — POSITIONER FOAM EGG CRATE ULNAR 2PCS (PINK)

## (undated) DEVICE — DRSG STERISTRIPS 0.5 X 4"

## (undated) DEVICE — DRAPE IOBAN 23" X 23"

## (undated) DEVICE — PACK BASIN SPECIAL PROCEDURE

## (undated) DEVICE — BLADE SCALPEL SAFETYLOCK #15

## (undated) DEVICE — TORQUE DEVICE FOR GUIDEWIRE 0.0100.038"

## (undated) DEVICE — TUBING HIGH POWER CONTRAST INJ

## (undated) DEVICE — NDL ENTRY PERC MCKNIGHT 18G

## (undated) DEVICE — DRAPE CAMERA VIDEO 7"X96"

## (undated) DEVICE — VENODYNE/SCD SLEEVE CALF LARGE

## (undated) DEVICE — STAPLER SKIN VISI-STAT 35 WIDE

## (undated) DEVICE — DRAPE EQUIPMENT BANDED BAG 30 X 30" (SHOWER CAP)

## (undated) DEVICE — SYR LUER LOK 20CC

## (undated) DEVICE — Device

## (undated) DEVICE — FOLEY TRAY 16FR 5CC LTX UMETER CLOSED

## (undated) DEVICE — DRAPE TOWEL BLUE 17" X 24"

## (undated) DEVICE — DRAPE FEMORAL ANGIOGRAPHY W TROUGH

## (undated) DEVICE — WARMING BLANKET UPPER ADULT

## (undated) DEVICE — GOWN TRIMAX LG

## (undated) DEVICE — ELCTR BOVIE PENCIL HANDPIECE

## (undated) DEVICE — NDL COUNTER FOAM AND MAGNET 40-70

## (undated) DEVICE — INFLATION DEVICE BASIXCOMPAK

## (undated) DEVICE — VISITEC 4X4

## (undated) DEVICE — DRSG OPSITE 13.75 X 4"

## (undated) DEVICE — TAPE GLO-N-TELL RADIOPAQUE 20 STRIPS